# Patient Record
Sex: MALE | Race: WHITE | NOT HISPANIC OR LATINO | Employment: OTHER | URBAN - METROPOLITAN AREA
[De-identification: names, ages, dates, MRNs, and addresses within clinical notes are randomized per-mention and may not be internally consistent; named-entity substitution may affect disease eponyms.]

---

## 2017-02-28 ENCOUNTER — GENERIC CONVERSION - ENCOUNTER (OUTPATIENT)
Dept: OTHER | Facility: OTHER | Age: 62
End: 2017-02-28

## 2017-03-03 ENCOUNTER — LAB REQUISITION (OUTPATIENT)
Dept: LAB | Facility: HOSPITAL | Age: 62
End: 2017-03-03

## 2017-03-03 ENCOUNTER — ALLSCRIPTS OFFICE VISIT (OUTPATIENT)
Dept: OTHER | Facility: OTHER | Age: 62
End: 2017-03-03

## 2017-03-03 DIAGNOSIS — E11.9 TYPE 2 DIABETES MELLITUS WITHOUT COMPLICATIONS (HCC): ICD-10-CM

## 2017-03-03 DIAGNOSIS — E78.5 HYPERLIPIDEMIA: ICD-10-CM

## 2017-03-03 DIAGNOSIS — I10 ESSENTIAL (PRIMARY) HYPERTENSION: ICD-10-CM

## 2017-03-03 LAB
ALBUMIN SERPL BCP-MCNC: 3.4 G/DL (ref 3.5–5)
ALP SERPL-CCNC: 58 U/L (ref 46–116)
ALT SERPL W P-5'-P-CCNC: 35 U/L (ref 12–78)
ANION GAP SERPL CALCULATED.3IONS-SCNC: 9 MMOL/L (ref 4–13)
AST SERPL W P-5'-P-CCNC: 16 U/L (ref 5–45)
BILIRUB SERPL-MCNC: 0.56 MG/DL (ref 0.2–1)
BUN SERPL-MCNC: 30 MG/DL (ref 5–25)
CALCIUM SERPL-MCNC: 9.6 MG/DL (ref 8.3–10.1)
CHLORIDE SERPL-SCNC: 101 MMOL/L (ref 100–108)
CHOLEST SERPL-MCNC: 151 MG/DL (ref 50–200)
CO2 SERPL-SCNC: 28 MMOL/L (ref 21–32)
CREAT SERPL-MCNC: 0.88 MG/DL (ref 0.6–1.3)
EST. AVERAGE GLUCOSE BLD GHB EST-MCNC: 183 MG/DL
GFR SERPL CREATININE-BSD FRML MDRD: >60 ML/MIN/1.73SQ M
GLUCOSE SERPL-MCNC: 100 MG/DL (ref 65–140)
HBA1C MFR BLD: 8 % (ref 4.2–6.3)
HDLC SERPL-MCNC: 52 MG/DL (ref 40–60)
LDLC SERPL CALC-MCNC: 63 MG/DL (ref 0–100)
POTASSIUM SERPL-SCNC: 4.8 MMOL/L (ref 3.5–5.3)
PROT SERPL-MCNC: 6.8 G/DL (ref 6.4–8.2)
SODIUM SERPL-SCNC: 138 MMOL/L (ref 136–145)
TRIGL SERPL-MCNC: 180 MG/DL

## 2017-03-03 PROCEDURE — 80061 LIPID PANEL: CPT | Performed by: FAMILY MEDICINE

## 2017-03-03 PROCEDURE — 80053 COMPREHEN METABOLIC PANEL: CPT | Performed by: FAMILY MEDICINE

## 2017-03-03 PROCEDURE — 83036 HEMOGLOBIN GLYCOSYLATED A1C: CPT | Performed by: FAMILY MEDICINE

## 2017-03-04 ENCOUNTER — GENERIC CONVERSION - ENCOUNTER (OUTPATIENT)
Dept: OTHER | Facility: OTHER | Age: 62
End: 2017-03-04

## 2017-03-15 ENCOUNTER — GENERIC CONVERSION - ENCOUNTER (OUTPATIENT)
Dept: OTHER | Facility: OTHER | Age: 62
End: 2017-03-15

## 2017-04-10 ENCOUNTER — ALLSCRIPTS OFFICE VISIT (OUTPATIENT)
Dept: OTHER | Facility: OTHER | Age: 62
End: 2017-04-10

## 2017-04-11 ENCOUNTER — GENERIC CONVERSION - ENCOUNTER (OUTPATIENT)
Dept: OTHER | Facility: OTHER | Age: 62
End: 2017-04-11

## 2017-04-18 ENCOUNTER — GENERIC CONVERSION - ENCOUNTER (OUTPATIENT)
Dept: OTHER | Facility: OTHER | Age: 62
End: 2017-04-18

## 2017-04-18 LAB
A/G RATIO (HISTORICAL): 1 (CALC) (ref 1–2.5)
ALBUMIN SERPL BCP-MCNC: 3.5 G/DL (ref 3.6–5.1)
ALP SERPL-CCNC: 71 U/L (ref 40–115)
ALT SERPL W P-5'-P-CCNC: 21 U/L (ref 9–46)
AST SERPL W P-5'-P-CCNC: 22 U/L (ref 10–35)
BACTERIA UR QL AUTO: ABNORMAL /HPF
BASOPHILS # BLD AUTO: 0.2 %
BASOPHILS # BLD AUTO: 29 CELLS/UL (ref 0–200)
BILIRUB SERPL-MCNC: 0.7 MG/DL (ref 0.2–1.2)
BILIRUB UR QL STRIP: NEGATIVE
BUN SERPL-MCNC: 19 MG/DL (ref 7–25)
BUN/CREA RATIO (HISTORICAL): ABNORMAL (CALC) (ref 6–22)
CALCIUM (ADJUSTED FOR ALBUMIN) (HISTORICAL): 9.9 MG/DL (CALC) (ref 8.6–10.2)
CALCIUM SERPL-MCNC: 9.3 MG/DL (ref 8.6–10.3)
CHLORIDE SERPL-SCNC: 96 MMOL/L (ref 98–110)
CO2 SERPL-SCNC: 23 MMOL/L (ref 20–31)
COLOR UR: YELLOW
COMMENT (HISTORICAL): CLEAR
CREAT SERPL-MCNC: 1.02 MG/DL (ref 0.7–1.25)
CULTURE RESULT (HISTORICAL): ABNORMAL
CULTURE RESULT (HISTORICAL): NORMAL
DEPRECATED RDW RBC AUTO: 14.3 % (ref 11–15)
EGFR AFRICAN AMERICAN (HISTORICAL): 90 ML/MIN/1.73M2
EGFR-AMERICAN CALC (HISTORICAL): 78 ML/MIN/1.73M2
EOSINOPHIL # BLD AUTO: 1.6 %
EOSINOPHIL # BLD AUTO: 230 CELLS/UL (ref 15–500)
FECAL OCCULT BLOOD DIAGNOSTIC (HISTORICAL): NEGATIVE
GAMMA GLOBULIN (HISTORICAL): 3.4 G/DL (CALC) (ref 1.9–3.7)
GIARDIA ANTIGEN STOOL (HISTORICAL): NORMAL
GLUCOSE (HISTORICAL): 244 MG/DL (ref 65–99)
GLUCOSE (HISTORICAL): ABNORMAL
HCT VFR BLD AUTO: 39.7 % (ref 38.5–50)
HGB BLD-MCNC: 13.2 G/DL (ref 13.2–17.1)
HYALINE CASTS #/AREA URNS LPF: ABNORMAL /LPF
KETONES UR STRIP-MCNC: NEGATIVE MG/DL
LEUKOCYTE ESTERASE UR QL STRIP: NEGATIVE
LYMPHOCYTES # BLD AUTO: 1224 CELLS/UL (ref 850–3900)
LYMPHOCYTES # BLD AUTO: 8.5 %
MCH RBC QN AUTO: 30.4 PG (ref 27–33)
MCHC RBC AUTO-ENTMCNC: 33.3 G/DL (ref 32–36)
MCV RBC AUTO: 91.1 FL (ref 80–100)
MONOCYTES # BLD AUTO: 662 CELLS/UL (ref 200–950)
MONOCYTES (HISTORICAL): 4.6 %
NEUTROPHILS # BLD AUTO: 85.1 %
NEUTROPHILS # BLD AUTO: ABNORMAL CELLS/UL (ref 1500–7800)
NITRITE UR QL STRIP: NEGATIVE
PH UR STRIP.AUTO: 6.5 [PH] (ref 5–8)
PLATELET # BLD AUTO: 441 THOUSAND/UL (ref 140–400)
PMV BLD AUTO: 8.4 FL (ref 7.5–12.5)
POTASSIUM SERPL-SCNC: 5 MMOL/L (ref 3.5–5.3)
PROT UR STRIP-MCNC: ABNORMAL MG/DL
RBC # BLD AUTO: 4.36 MILLION/UL (ref 4.2–5.8)
RBC (HISTORICAL): ABNORMAL /HPF
SHIGA TOXIN TEST (HISTORICAL): NORMAL
SODIUM SERPL-SCNC: 132 MMOL/L (ref 135–146)
SP GR UR STRIP.AUTO: 1.03 (ref 1–1.03)
SQUAMOUS EPITHELIAL CELLS (HISTORICAL): ABNORMAL /HPF
TOTAL PROTEIN (HISTORICAL): 6.9 G/DL (ref 6.1–8.1)
WBC # BLD AUTO: 14.4 THOUSAND/UL (ref 3.8–10.8)
WBC # BLD AUTO: ABNORMAL /HPF

## 2017-04-22 ENCOUNTER — LAB CONVERSION - ENCOUNTER (OUTPATIENT)
Dept: OTHER | Facility: OTHER | Age: 62
End: 2017-04-22

## 2017-04-22 LAB — CULTURE RESULT (HISTORICAL): NORMAL

## 2017-04-23 ENCOUNTER — GENERIC CONVERSION - ENCOUNTER (OUTPATIENT)
Dept: OTHER | Facility: OTHER | Age: 62
End: 2017-04-23

## 2017-04-24 ENCOUNTER — LAB REQUISITION (OUTPATIENT)
Dept: LAB | Facility: HOSPITAL | Age: 62
End: 2017-04-24
Payer: COMMERCIAL

## 2017-04-24 ENCOUNTER — ALLSCRIPTS OFFICE VISIT (OUTPATIENT)
Dept: OTHER | Facility: OTHER | Age: 62
End: 2017-04-24

## 2017-04-24 DIAGNOSIS — Z01.818 ENCOUNTER FOR OTHER PREPROCEDURAL EXAMINATION: ICD-10-CM

## 2017-04-24 DIAGNOSIS — I10 ESSENTIAL (PRIMARY) HYPERTENSION: ICD-10-CM

## 2017-04-24 DIAGNOSIS — E11.40 TYPE 2 DIABETES MELLITUS WITH DIABETIC NEUROPATHY (HCC): ICD-10-CM

## 2017-04-24 LAB
BASOPHILS # BLD AUTO: 0.07 THOUSANDS/ΜL (ref 0–0.1)
BASOPHILS NFR BLD AUTO: 1 % (ref 0–1)
EOSINOPHIL # BLD AUTO: 0.35 THOUSAND/ΜL (ref 0–0.61)
EOSINOPHIL NFR BLD AUTO: 3 % (ref 0–6)
ERYTHROCYTE [DISTWIDTH] IN BLOOD BY AUTOMATED COUNT: 14.4 % (ref 11.6–15.1)
HCT VFR BLD AUTO: 35.9 % (ref 36.5–49.3)
HGB BLD-MCNC: 11.9 G/DL (ref 12–17)
LYMPHOCYTES # BLD AUTO: 2.2 THOUSANDS/ΜL (ref 0.6–4.47)
LYMPHOCYTES NFR BLD AUTO: 17 % (ref 14–44)
MCH RBC QN AUTO: 30.6 PG (ref 26.8–34.3)
MCHC RBC AUTO-ENTMCNC: 33.1 G/DL (ref 31.4–37.4)
MCV RBC AUTO: 92 FL (ref 82–98)
MONOCYTES # BLD AUTO: 0.82 THOUSAND/ΜL (ref 0.17–1.22)
MONOCYTES NFR BLD AUTO: 6 % (ref 4–12)
NEUTROPHILS # BLD AUTO: 9.35 THOUSANDS/ΜL (ref 1.85–7.62)
NEUTS SEG NFR BLD AUTO: 73 % (ref 43–75)
NRBC BLD AUTO-RTO: 0 /100 WBCS
PLATELET # BLD AUTO: 408 THOUSANDS/UL (ref 149–390)
PMV BLD AUTO: 9.7 FL (ref 8.9–12.7)
RBC # BLD AUTO: 3.89 MILLION/UL (ref 3.88–5.62)
WBC # BLD AUTO: 12.96 THOUSAND/UL (ref 4.31–10.16)

## 2017-04-24 PROCEDURE — 85025 COMPLETE CBC W/AUTO DIFF WBC: CPT | Performed by: FAMILY MEDICINE

## 2017-04-26 ENCOUNTER — ANESTHESIA EVENT (OUTPATIENT)
Dept: PERIOP | Facility: HOSPITAL | Age: 62
End: 2017-04-26
Payer: COMMERCIAL

## 2017-05-02 ENCOUNTER — HOSPITAL ENCOUNTER (OUTPATIENT)
Facility: HOSPITAL | Age: 62
Setting detail: OUTPATIENT SURGERY
Discharge: HOME | End: 2017-05-02
Attending: PODIATRIST | Admitting: PODIATRIST
Payer: COMMERCIAL

## 2017-05-02 ENCOUNTER — APPOINTMENT (OUTPATIENT)
Dept: RADIOLOGY | Facility: HOSPITAL | Age: 62
End: 2017-05-02
Payer: COMMERCIAL

## 2017-05-02 ENCOUNTER — ANESTHESIA (OUTPATIENT)
Dept: PERIOP | Facility: HOSPITAL | Age: 62
End: 2017-05-02
Payer: COMMERCIAL

## 2017-05-02 VITALS
BODY MASS INDEX: 29.63 KG/M2 | RESPIRATION RATE: 16 BRPM | OXYGEN SATURATION: 94 % | WEIGHT: 207 LBS | SYSTOLIC BLOOD PRESSURE: 161 MMHG | HEIGHT: 70 IN | HEART RATE: 81 BPM | TEMPERATURE: 98.4 F | DIASTOLIC BLOOD PRESSURE: 74 MMHG

## 2017-05-02 DIAGNOSIS — M86.9 OSTEOMYELITIS (HCC): ICD-10-CM

## 2017-05-02 LAB — GLUCOSE SERPL-MCNC: 189 MG/DL (ref 65–140)

## 2017-05-02 PROCEDURE — 87077 CULTURE AEROBIC IDENTIFY: CPT | Performed by: PODIATRIST

## 2017-05-02 PROCEDURE — 87070 CULTURE OTHR SPECIMN AEROBIC: CPT | Performed by: PODIATRIST

## 2017-05-02 PROCEDURE — 73630 X-RAY EXAM OF FOOT: CPT

## 2017-05-02 PROCEDURE — 87147 CULTURE TYPE IMMUNOLOGIC: CPT | Performed by: PODIATRIST

## 2017-05-02 PROCEDURE — 87075 CULTR BACTERIA EXCEPT BLOOD: CPT | Performed by: PODIATRIST

## 2017-05-02 PROCEDURE — 88311 DECALCIFY TISSUE: CPT | Performed by: PODIATRIST

## 2017-05-02 PROCEDURE — 87176 TISSUE HOMOGENIZATION CULTR: CPT | Performed by: PODIATRIST

## 2017-05-02 PROCEDURE — 82948 REAGENT STRIP/BLOOD GLUCOSE: CPT

## 2017-05-02 PROCEDURE — 87186 SC STD MICRODIL/AGAR DIL: CPT | Performed by: PODIATRIST

## 2017-05-02 PROCEDURE — 87205 SMEAR GRAM STAIN: CPT | Performed by: PODIATRIST

## 2017-05-02 PROCEDURE — 88305 TISSUE EXAM BY PATHOLOGIST: CPT | Performed by: PODIATRIST

## 2017-05-02 RX ORDER — ONDANSETRON 2 MG/ML
INJECTION INTRAMUSCULAR; INTRAVENOUS AS NEEDED
Status: DISCONTINUED | OUTPATIENT
Start: 2017-05-02 | End: 2017-05-02 | Stop reason: SURG

## 2017-05-02 RX ORDER — PROPOFOL 10 MG/ML
INJECTION, EMULSION INTRAVENOUS AS NEEDED
Status: DISCONTINUED | OUTPATIENT
Start: 2017-05-02 | End: 2017-05-02 | Stop reason: SURG

## 2017-05-02 RX ORDER — BUPIVACAINE HYDROCHLORIDE 5 MG/ML
INJECTION, SOLUTION PERINEURAL AS NEEDED
Status: DISCONTINUED | OUTPATIENT
Start: 2017-05-02 | End: 2017-05-02 | Stop reason: HOSPADM

## 2017-05-02 RX ORDER — MAGNESIUM HYDROXIDE 1200 MG/15ML
LIQUID ORAL AS NEEDED
Status: DISCONTINUED | OUTPATIENT
Start: 2017-05-02 | End: 2017-05-02 | Stop reason: HOSPADM

## 2017-05-02 RX ORDER — PROPOFOL 10 MG/ML
INJECTION, EMULSION INTRAVENOUS CONTINUOUS PRN
Status: DISCONTINUED | OUTPATIENT
Start: 2017-05-02 | End: 2017-05-02 | Stop reason: SURG

## 2017-05-02 RX ORDER — FENTANYL CITRATE 50 UG/ML
50 INJECTION, SOLUTION INTRAMUSCULAR; INTRAVENOUS
Status: DISCONTINUED | OUTPATIENT
Start: 2017-05-02 | End: 2017-05-02 | Stop reason: HOSPADM

## 2017-05-02 RX ORDER — SODIUM CHLORIDE 9 MG/ML
125 INJECTION, SOLUTION INTRAVENOUS CONTINUOUS
Status: DISCONTINUED | OUTPATIENT
Start: 2017-05-02 | End: 2017-05-02 | Stop reason: HOSPADM

## 2017-05-02 RX ORDER — OXYCODONE HYDROCHLORIDE 5 MG/1
5 TABLET ORAL EVERY 4 HOURS PRN
Status: DISCONTINUED | OUTPATIENT
Start: 2017-05-02 | End: 2017-05-02 | Stop reason: HOSPADM

## 2017-05-02 RX ORDER — ONDANSETRON 2 MG/ML
4 INJECTION INTRAMUSCULAR; INTRAVENOUS EVERY 6 HOURS PRN
Status: DISCONTINUED | OUTPATIENT
Start: 2017-05-02 | End: 2017-05-02 | Stop reason: HOSPADM

## 2017-05-02 RX ORDER — MIDAZOLAM HYDROCHLORIDE 1 MG/ML
INJECTION INTRAMUSCULAR; INTRAVENOUS AS NEEDED
Status: DISCONTINUED | OUTPATIENT
Start: 2017-05-02 | End: 2017-05-02 | Stop reason: SURG

## 2017-05-02 RX ORDER — FENTANYL CITRATE 50 UG/ML
INJECTION, SOLUTION INTRAMUSCULAR; INTRAVENOUS AS NEEDED
Status: DISCONTINUED | OUTPATIENT
Start: 2017-05-02 | End: 2017-05-02 | Stop reason: SURG

## 2017-05-02 RX ADMIN — SODIUM CHLORIDE 125 ML/HR: 0.9 INJECTION, SOLUTION INTRAVENOUS at 10:15

## 2017-05-02 RX ADMIN — FENTANYL CITRATE 25 MCG: 50 INJECTION, SOLUTION INTRAMUSCULAR; INTRAVENOUS at 11:25

## 2017-05-02 RX ADMIN — SODIUM CHLORIDE: 0.9 INJECTION, SOLUTION INTRAVENOUS at 11:09

## 2017-05-02 RX ADMIN — PROPOFOL 30 MCG/KG/MIN: 10 INJECTION, EMULSION INTRAVENOUS at 11:17

## 2017-05-02 RX ADMIN — CEFAZOLIN SODIUM 2000 MG: 1 SOLUTION INTRAVENOUS at 11:24

## 2017-05-02 RX ADMIN — LIDOCAINE HYDROCHLORIDE 3 ML: 20 INJECTION, SOLUTION INTRAVENOUS at 11:17

## 2017-05-02 RX ADMIN — SODIUM CHLORIDE 125 ML/HR: 0.9 INJECTION, SOLUTION INTRAVENOUS at 13:12

## 2017-05-02 RX ADMIN — ONDANSETRON HYDROCHLORIDE 4 MG: 2 INJECTION, SOLUTION INTRAVENOUS at 12:15

## 2017-05-02 RX ADMIN — FENTANYL CITRATE 50 MCG: 50 INJECTION, SOLUTION INTRAMUSCULAR; INTRAVENOUS at 11:17

## 2017-05-02 RX ADMIN — PROPOFOL 50 MG: 10 INJECTION, EMULSION INTRAVENOUS at 11:17

## 2017-05-02 RX ADMIN — FENTANYL CITRATE 25 MCG: 50 INJECTION, SOLUTION INTRAMUSCULAR; INTRAVENOUS at 11:35

## 2017-05-02 RX ADMIN — MIDAZOLAM HYDROCHLORIDE 2 MG: 1 INJECTION, SOLUTION INTRAMUSCULAR; INTRAVENOUS at 11:09

## 2017-05-04 LAB
BACTERIA SPEC ANAEROBE CULT: NORMAL
BACTERIA SPEC ANAEROBE CULT: NORMAL
BACTERIA TISS AEROBE CULT: NORMAL
BACTERIA TISS AEROBE CULT: NORMAL
GRAM STN SPEC: NORMAL
GRAM STN SPEC: NORMAL

## 2017-05-05 LAB
BACTERIA SPEC ANAEROBE CULT: NORMAL
BACTERIA WND AEROBE CULT: NORMAL
GRAM STN SPEC: NORMAL

## 2017-07-07 ENCOUNTER — LAB REQUISITION (OUTPATIENT)
Dept: LAB | Facility: HOSPITAL | Age: 62
End: 2017-07-07
Payer: COMMERCIAL

## 2017-07-07 ENCOUNTER — ALLSCRIPTS OFFICE VISIT (OUTPATIENT)
Dept: OTHER | Facility: OTHER | Age: 62
End: 2017-07-07

## 2017-07-07 DIAGNOSIS — Z12.5 ENCOUNTER FOR SCREENING FOR MALIGNANT NEOPLASM OF PROSTATE: ICD-10-CM

## 2017-07-07 DIAGNOSIS — J31.0 CHRONIC RHINITIS: ICD-10-CM

## 2017-07-07 DIAGNOSIS — M10.9 GOUT: ICD-10-CM

## 2017-07-07 DIAGNOSIS — E78.5 HYPERLIPIDEMIA: ICD-10-CM

## 2017-07-07 DIAGNOSIS — E11.40 TYPE 2 DIABETES MELLITUS WITH DIABETIC NEUROPATHY (HCC): ICD-10-CM

## 2017-07-07 DIAGNOSIS — I10 ESSENTIAL (PRIMARY) HYPERTENSION: ICD-10-CM

## 2017-07-07 DIAGNOSIS — L30.9 DERMATITIS: ICD-10-CM

## 2017-07-07 DIAGNOSIS — E11.9 TYPE 2 DIABETES MELLITUS WITHOUT COMPLICATIONS (HCC): ICD-10-CM

## 2017-07-07 DIAGNOSIS — M19.90 OSTEOARTHRITIS: ICD-10-CM

## 2017-07-07 LAB
25(OH)D3 SERPL-MCNC: 30.6 NG/ML (ref 30–100)
ALBUMIN SERPL BCP-MCNC: 3.8 G/DL (ref 3.5–5)
ALP SERPL-CCNC: 67 U/L (ref 46–116)
ALT SERPL W P-5'-P-CCNC: 38 U/L (ref 12–78)
ANION GAP SERPL CALCULATED.3IONS-SCNC: 9 MMOL/L (ref 4–13)
AST SERPL W P-5'-P-CCNC: 23 U/L (ref 5–45)
BILIRUB SERPL-MCNC: 0.61 MG/DL (ref 0.2–1)
BUN SERPL-MCNC: 24 MG/DL (ref 5–25)
CALCIUM ALBUM COR SERPL-MCNC: 10.4 MG/DL (ref 8.3–10.1)
CALCIUM SERPL-MCNC: 10.2 MG/DL (ref 8.3–10.1)
CHLORIDE SERPL-SCNC: 102 MMOL/L (ref 100–108)
CHOLEST SERPL-MCNC: 155 MG/DL (ref 50–200)
CO2 SERPL-SCNC: 26 MMOL/L (ref 21–32)
CREAT SERPL-MCNC: 0.81 MG/DL (ref 0.6–1.3)
GFR SERPL CREATININE-BSD FRML MDRD: >60 ML/MIN/1.73SQ M
GLUCOSE SERPL-MCNC: 140 MG/DL (ref 65–140)
HDLC SERPL-MCNC: 44 MG/DL (ref 40–60)
LDLC SERPL CALC-MCNC: 64 MG/DL (ref 0–100)
POTASSIUM SERPL-SCNC: 4.4 MMOL/L (ref 3.5–5.3)
PROT SERPL-MCNC: 7.2 G/DL (ref 6.4–8.2)
PSA SERPL-MCNC: 0.4 NG/ML (ref 0–4)
SODIUM SERPL-SCNC: 137 MMOL/L (ref 136–145)
TRIGL SERPL-MCNC: 237 MG/DL
TSH SERPL DL<=0.05 MIU/L-ACNC: 1.89 UIU/ML (ref 0.36–3.74)
URATE SERPL-MCNC: 4.9 MG/DL (ref 4.2–8)

## 2017-07-07 PROCEDURE — 82306 VITAMIN D 25 HYDROXY: CPT | Performed by: FAMILY MEDICINE

## 2017-07-07 PROCEDURE — G0103 PSA SCREENING: HCPCS | Performed by: FAMILY MEDICINE

## 2017-07-07 PROCEDURE — 80061 LIPID PANEL: CPT | Performed by: FAMILY MEDICINE

## 2017-07-07 PROCEDURE — 84443 ASSAY THYROID STIM HORMONE: CPT | Performed by: FAMILY MEDICINE

## 2017-07-07 PROCEDURE — 80053 COMPREHEN METABOLIC PANEL: CPT | Performed by: FAMILY MEDICINE

## 2017-07-07 PROCEDURE — 84550 ASSAY OF BLOOD/URIC ACID: CPT | Performed by: FAMILY MEDICINE

## 2017-07-11 ENCOUNTER — TRANSCRIBE ORDERS (OUTPATIENT)
Dept: ADMINISTRATIVE | Facility: HOSPITAL | Age: 62
End: 2017-07-11

## 2017-07-11 DIAGNOSIS — L97.509: Primary | ICD-10-CM

## 2017-07-11 DIAGNOSIS — E11.8 TYPE 2 DIABETES MELLITUS WITH COMPLICATION, UNSPECIFIED LONG TERM INSULIN USE STATUS: ICD-10-CM

## 2017-07-13 ENCOUNTER — LAB REQUISITION (OUTPATIENT)
Dept: LAB | Facility: HOSPITAL | Age: 62
End: 2017-07-13
Payer: COMMERCIAL

## 2017-07-13 DIAGNOSIS — E11.40 TYPE 2 DIABETES MELLITUS WITH DIABETIC NEUROPATHY (HCC): ICD-10-CM

## 2017-07-13 LAB
BASOPHILS # BLD MANUAL: 0.23 THOUSAND/UL (ref 0–0.1)
BASOPHILS NFR MAR MANUAL: 2 % (ref 0–1)
EOSINOPHIL # BLD MANUAL: 0.45 THOUSAND/UL (ref 0–0.4)
EOSINOPHIL NFR BLD MANUAL: 4 % (ref 0–6)
ERYTHROCYTE [DISTWIDTH] IN BLOOD BY AUTOMATED COUNT: 14 % (ref 11.6–15.1)
HCT VFR BLD AUTO: 35.9 % (ref 36.5–49.3)
HGB BLD-MCNC: 13.1 G/DL (ref 12–17)
LYMPHOCYTES # BLD AUTO: 2.37 THOUSAND/UL (ref 0.6–4.47)
LYMPHOCYTES # BLD AUTO: 21 % (ref 14–44)
MCH RBC QN AUTO: 31.9 PG (ref 26.8–34.3)
MCHC RBC AUTO-ENTMCNC: 36.5 G/DL (ref 31.4–37.4)
MCV RBC AUTO: 87 FL (ref 82–98)
MONOCYTES # BLD AUTO: 0.68 THOUSAND/UL (ref 0–1.22)
MONOCYTES NFR BLD: 6 % (ref 4–12)
NEUTROPHILS # BLD MANUAL: 7.56 THOUSAND/UL (ref 1.85–7.62)
NEUTS SEG NFR BLD AUTO: 67 % (ref 43–75)
NRBC BLD AUTO-RTO: 0 /100 WBCS
PLATELET # BLD AUTO: 334 THOUSANDS/UL (ref 149–390)
PLATELET BLD QL SMEAR: ABNORMAL
PMV BLD AUTO: 10.1 FL (ref 8.9–12.7)
RBC # BLD AUTO: 4.11 MILLION/UL (ref 3.88–5.62)
RBC MORPH BLD: NORMAL
WBC # BLD AUTO: 11.29 THOUSAND/UL (ref 4.31–10.16)

## 2017-07-13 PROCEDURE — 85007 BL SMEAR W/DIFF WBC COUNT: CPT | Performed by: FAMILY MEDICINE

## 2017-07-13 PROCEDURE — 85027 COMPLETE CBC AUTOMATED: CPT | Performed by: FAMILY MEDICINE

## 2017-07-13 PROCEDURE — 83036 HEMOGLOBIN GLYCOSYLATED A1C: CPT | Performed by: FAMILY MEDICINE

## 2017-07-14 LAB
EST. AVERAGE GLUCOSE BLD GHB EST-MCNC: 186 MG/DL
HBA1C MFR BLD: 8.1 % (ref 4.2–6.3)

## 2017-07-15 ENCOUNTER — GENERIC CONVERSION - ENCOUNTER (OUTPATIENT)
Dept: OTHER | Facility: OTHER | Age: 62
End: 2017-07-15

## 2017-07-18 ENCOUNTER — HOSPITAL ENCOUNTER (OUTPATIENT)
Dept: NON INVASIVE DIAGNOSTICS | Facility: CLINIC | Age: 62
Discharge: HOME | End: 2017-07-18
Payer: COMMERCIAL

## 2017-07-18 DIAGNOSIS — L97.509: ICD-10-CM

## 2017-07-18 PROCEDURE — 93925 LOWER EXTREMITY STUDY: CPT

## 2017-07-18 PROCEDURE — 93923 UPR/LXTR ART STDY 3+ LVLS: CPT

## 2017-07-19 ENCOUNTER — ALLSCRIPTS OFFICE VISIT (OUTPATIENT)
Dept: OTHER | Facility: OTHER | Age: 62
End: 2017-07-19

## 2017-07-19 ENCOUNTER — LAB REQUISITION (OUTPATIENT)
Dept: LAB | Facility: HOSPITAL | Age: 62
End: 2017-07-19
Payer: COMMERCIAL

## 2017-07-19 DIAGNOSIS — E11.621 TYPE 2 DIABETES MELLITUS WITH FOOT ULCER (CODE) (HCC): ICD-10-CM

## 2017-07-19 PROCEDURE — 87205 SMEAR GRAM STAIN: CPT | Performed by: PODIATRIST

## 2017-07-19 PROCEDURE — 87077 CULTURE AEROBIC IDENTIFY: CPT | Performed by: PODIATRIST

## 2017-07-19 PROCEDURE — 87075 CULTR BACTERIA EXCEPT BLOOD: CPT | Performed by: PODIATRIST

## 2017-07-19 PROCEDURE — 87070 CULTURE OTHR SPECIMN AEROBIC: CPT | Performed by: PODIATRIST

## 2017-07-19 PROCEDURE — 87186 SC STD MICRODIL/AGAR DIL: CPT | Performed by: PODIATRIST

## 2017-07-21 LAB
BACTERIA WND AEROBE CULT: NORMAL
BACTERIA WND AEROBE CULT: NORMAL
GRAM STN SPEC: NORMAL
GRAM STN SPEC: NORMAL

## 2017-07-22 LAB — BACTERIA SPEC ANAEROBE CULT: NORMAL

## 2017-07-24 ENCOUNTER — ANESTHESIA EVENT (OUTPATIENT)
Dept: PERIOP | Facility: HOSPITAL | Age: 62
End: 2017-07-24
Payer: COMMERCIAL

## 2017-07-25 ENCOUNTER — APPOINTMENT (OUTPATIENT)
Dept: RADIOLOGY | Facility: HOSPITAL | Age: 62
End: 2017-07-25
Payer: COMMERCIAL

## 2017-07-25 ENCOUNTER — HOSPITAL ENCOUNTER (OUTPATIENT)
Facility: HOSPITAL | Age: 62
Setting detail: OUTPATIENT SURGERY
Discharge: HOME | End: 2017-07-25
Attending: PODIATRIST | Admitting: PODIATRIST
Payer: COMMERCIAL

## 2017-07-25 ENCOUNTER — ANESTHESIA (OUTPATIENT)
Dept: PERIOP | Facility: HOSPITAL | Age: 62
End: 2017-07-25
Payer: COMMERCIAL

## 2017-07-25 VITALS
TEMPERATURE: 97.8 F | HEART RATE: 64 BPM | DIASTOLIC BLOOD PRESSURE: 72 MMHG | RESPIRATION RATE: 18 BRPM | HEIGHT: 70 IN | SYSTOLIC BLOOD PRESSURE: 163 MMHG | WEIGHT: 215 LBS | OXYGEN SATURATION: 98 % | BODY MASS INDEX: 30.78 KG/M2

## 2017-07-25 DIAGNOSIS — E11.621 TYPE 2 DIABETES MELLITUS WITH FOOT ULCER (CODE) (HCC): ICD-10-CM

## 2017-07-25 LAB — GLUCOSE SERPL-MCNC: 214 MG/DL (ref 65–140)

## 2017-07-25 PROCEDURE — 87070 CULTURE OTHR SPECIMN AEROBIC: CPT | Performed by: PODIATRIST

## 2017-07-25 PROCEDURE — 87147 CULTURE TYPE IMMUNOLOGIC: CPT | Performed by: PODIATRIST

## 2017-07-25 PROCEDURE — 87205 SMEAR GRAM STAIN: CPT | Performed by: PODIATRIST

## 2017-07-25 PROCEDURE — 88305 TISSUE EXAM BY PATHOLOGIST: CPT | Performed by: PODIATRIST

## 2017-07-25 PROCEDURE — 73630 X-RAY EXAM OF FOOT: CPT

## 2017-07-25 PROCEDURE — 82948 REAGENT STRIP/BLOOD GLUCOSE: CPT

## 2017-07-25 PROCEDURE — 87075 CULTR BACTERIA EXCEPT BLOOD: CPT | Performed by: PODIATRIST

## 2017-07-25 PROCEDURE — 87176 TISSUE HOMOGENIZATION CULTR: CPT | Performed by: PODIATRIST

## 2017-07-25 PROCEDURE — 87186 SC STD MICRODIL/AGAR DIL: CPT | Performed by: PODIATRIST

## 2017-07-25 PROCEDURE — 87185 SC STD ENZYME DETCJ PER NZM: CPT | Performed by: PODIATRIST

## 2017-07-25 PROCEDURE — 88311 DECALCIFY TISSUE: CPT | Performed by: PODIATRIST

## 2017-07-25 RX ORDER — ACETAMINOPHEN 325 MG/1
650 TABLET ORAL EVERY 4 HOURS PRN
Status: DISCONTINUED | OUTPATIENT
Start: 2017-07-25 | End: 2017-07-25 | Stop reason: HOSPADM

## 2017-07-25 RX ORDER — ONDANSETRON 2 MG/ML
4 INJECTION INTRAMUSCULAR; INTRAVENOUS EVERY 6 HOURS PRN
Status: DISCONTINUED | OUTPATIENT
Start: 2017-07-25 | End: 2017-07-25 | Stop reason: HOSPADM

## 2017-07-25 RX ORDER — PROPOFOL 10 MG/ML
INJECTION, EMULSION INTRAVENOUS CONTINUOUS PRN
Status: DISCONTINUED | OUTPATIENT
Start: 2017-07-25 | End: 2017-07-25 | Stop reason: SURG

## 2017-07-25 RX ORDER — ALBUTEROL SULFATE 2.5 MG/3ML
2.5 SOLUTION RESPIRATORY (INHALATION) ONCE AS NEEDED
Status: DISCONTINUED | OUTPATIENT
Start: 2017-07-25 | End: 2017-07-25 | Stop reason: HOSPADM

## 2017-07-25 RX ORDER — MIDAZOLAM HYDROCHLORIDE 1 MG/ML
INJECTION INTRAMUSCULAR; INTRAVENOUS AS NEEDED
Status: DISCONTINUED | OUTPATIENT
Start: 2017-07-25 | End: 2017-07-25 | Stop reason: SURG

## 2017-07-25 RX ORDER — CEPHALEXIN 500 MG/1
500 CAPSULE ORAL EVERY 8 HOURS SCHEDULED
Qty: 30 CAPSULE | Refills: 0 | Status: SHIPPED | OUTPATIENT
Start: 2017-07-25 | End: 2017-08-04

## 2017-07-25 RX ORDER — SODIUM CHLORIDE 9 MG/ML
125 INJECTION, SOLUTION INTRAVENOUS CONTINUOUS
Status: DISCONTINUED | OUTPATIENT
Start: 2017-07-25 | End: 2017-07-25 | Stop reason: HOSPADM

## 2017-07-25 RX ORDER — BUPIVACAINE HYDROCHLORIDE 5 MG/ML
INJECTION, SOLUTION PERINEURAL AS NEEDED
Status: DISCONTINUED | OUTPATIENT
Start: 2017-07-25 | End: 2017-07-25 | Stop reason: HOSPADM

## 2017-07-25 RX ORDER — OXYCODONE HYDROCHLORIDE 5 MG/1
10 TABLET ORAL EVERY 6 HOURS PRN
Status: DISCONTINUED | OUTPATIENT
Start: 2017-07-25 | End: 2017-07-25 | Stop reason: HOSPADM

## 2017-07-25 RX ORDER — PROPOFOL 10 MG/ML
INJECTION, EMULSION INTRAVENOUS AS NEEDED
Status: DISCONTINUED | OUTPATIENT
Start: 2017-07-25 | End: 2017-07-25 | Stop reason: SURG

## 2017-07-25 RX ORDER — FENTANYL CITRATE/PF 50 MCG/ML
50 SYRINGE (ML) INJECTION
Status: DISCONTINUED | OUTPATIENT
Start: 2017-07-25 | End: 2017-07-25 | Stop reason: HOSPADM

## 2017-07-25 RX ORDER — FENTANYL CITRATE 50 UG/ML
INJECTION, SOLUTION INTRAMUSCULAR; INTRAVENOUS AS NEEDED
Status: DISCONTINUED | OUTPATIENT
Start: 2017-07-25 | End: 2017-07-25 | Stop reason: SURG

## 2017-07-25 RX ORDER — MAGNESIUM HYDROXIDE 1200 MG/15ML
LIQUID ORAL AS NEEDED
Status: DISCONTINUED | OUTPATIENT
Start: 2017-07-25 | End: 2017-07-25 | Stop reason: HOSPADM

## 2017-07-25 RX ORDER — MEPERIDINE HYDROCHLORIDE 50 MG/ML
12.5 INJECTION INTRAMUSCULAR; INTRAVENOUS; SUBCUTANEOUS AS NEEDED
Status: DISCONTINUED | OUTPATIENT
Start: 2017-07-25 | End: 2017-07-25 | Stop reason: HOSPADM

## 2017-07-25 RX ORDER — OXYCODONE HYDROCHLORIDE 5 MG/1
5 TABLET ORAL EVERY 4 HOURS PRN
Status: DISCONTINUED | OUTPATIENT
Start: 2017-07-25 | End: 2017-07-25 | Stop reason: HOSPADM

## 2017-07-25 RX ADMIN — PROPOFOL 80 MCG/KG/MIN: 10 INJECTION, EMULSION INTRAVENOUS at 12:31

## 2017-07-25 RX ADMIN — CEFAZOLIN SODIUM 2000 MG: 2 SOLUTION INTRAVENOUS at 12:29

## 2017-07-25 RX ADMIN — SODIUM CHLORIDE: 0.9 INJECTION, SOLUTION INTRAVENOUS at 13:19

## 2017-07-25 RX ADMIN — FENTANYL CITRATE 50 MCG: 50 INJECTION, SOLUTION INTRAMUSCULAR; INTRAVENOUS at 12:28

## 2017-07-25 RX ADMIN — MIDAZOLAM HYDROCHLORIDE 2 MG: 1 INJECTION, SOLUTION INTRAMUSCULAR; INTRAVENOUS at 12:28

## 2017-07-25 RX ADMIN — SODIUM CHLORIDE 125 ML/HR: 0.9 INJECTION, SOLUTION INTRAVENOUS at 10:49

## 2017-07-25 RX ADMIN — PROPOFOL 50 MG: 10 INJECTION, EMULSION INTRAVENOUS at 12:31

## 2017-07-29 LAB
BACTERIA SPEC ANAEROBE CULT: NORMAL
BACTERIA TISS AEROBE CULT: NORMAL
GRAM STN SPEC: NORMAL
GRAM STN SPEC: NORMAL

## 2017-08-03 ENCOUNTER — HOSPITAL ENCOUNTER (OUTPATIENT)
Dept: NON INVASIVE DIAGNOSTICS | Facility: HOSPITAL | Age: 62
Discharge: HOME | End: 2017-08-03
Attending: PODIATRIST
Payer: COMMERCIAL

## 2017-08-03 DIAGNOSIS — E11.8 TYPE 2 DIABETES MELLITUS WITH COMPLICATION, UNSPECIFIED LONG TERM INSULIN USE STATUS: ICD-10-CM

## 2017-08-03 DIAGNOSIS — L97.509: ICD-10-CM

## 2017-08-16 ENCOUNTER — GENERIC CONVERSION - ENCOUNTER (OUTPATIENT)
Dept: OTHER | Facility: OTHER | Age: 62
End: 2017-08-16

## 2017-08-16 ENCOUNTER — LAB REQUISITION (OUTPATIENT)
Dept: LAB | Facility: HOSPITAL | Age: 62
End: 2017-08-16
Payer: COMMERCIAL

## 2017-08-16 ENCOUNTER — ALLSCRIPTS OFFICE VISIT (OUTPATIENT)
Dept: OTHER | Facility: OTHER | Age: 62
End: 2017-08-16

## 2017-08-16 DIAGNOSIS — R42 DIZZINESS AND GIDDINESS: ICD-10-CM

## 2017-08-16 LAB
ALBUMIN SERPL BCP-MCNC: 3.7 G/DL (ref 3.5–5)
ALP SERPL-CCNC: 70 U/L (ref 46–116)
ALT SERPL W P-5'-P-CCNC: 38 U/L (ref 12–78)
ANION GAP SERPL CALCULATED.3IONS-SCNC: 9 MMOL/L (ref 4–13)
AST SERPL W P-5'-P-CCNC: 26 U/L (ref 5–45)
BASOPHILS # BLD AUTO: 0.03 THOUSANDS/ΜL (ref 0–0.1)
BASOPHILS NFR BLD AUTO: 0 % (ref 0–1)
BILIRUB SERPL-MCNC: 0.59 MG/DL (ref 0.2–1)
BUN SERPL-MCNC: 17 MG/DL (ref 5–25)
CALCIUM SERPL-MCNC: 9.9 MG/DL (ref 8.3–10.1)
CHLORIDE SERPL-SCNC: 102 MMOL/L (ref 100–108)
CO2 SERPL-SCNC: 27 MMOL/L (ref 21–32)
CREAT SERPL-MCNC: 0.75 MG/DL (ref 0.6–1.3)
EOSINOPHIL # BLD AUTO: 0.75 THOUSAND/ΜL (ref 0–0.61)
EOSINOPHIL NFR BLD AUTO: 7 % (ref 0–6)
ERYTHROCYTE [DISTWIDTH] IN BLOOD BY AUTOMATED COUNT: 14.6 % (ref 11.6–15.1)
GFR SERPL CREATININE-BSD FRML MDRD: 98 ML/MIN/1.73SQ M
GLUCOSE SERPL-MCNC: 147 MG/DL (ref 65–140)
HCT VFR BLD AUTO: 36.8 % (ref 36.5–49.3)
HGB BLD-MCNC: 12.7 G/DL (ref 12–17)
LYMPHOCYTES # BLD AUTO: 2.43 THOUSANDS/ΜL (ref 0.6–4.47)
LYMPHOCYTES NFR BLD AUTO: 22 % (ref 14–44)
MCH RBC QN AUTO: 30.8 PG (ref 26.8–34.3)
MCHC RBC AUTO-ENTMCNC: 34.5 G/DL (ref 31.4–37.4)
MCV RBC AUTO: 89 FL (ref 82–98)
MONOCYTES # BLD AUTO: 0.75 THOUSAND/ΜL (ref 0.17–1.22)
MONOCYTES NFR BLD AUTO: 7 % (ref 4–12)
NEUTROPHILS # BLD AUTO: 7.23 THOUSANDS/ΜL (ref 1.85–7.62)
NEUTS SEG NFR BLD AUTO: 64 % (ref 43–75)
NRBC BLD AUTO-RTO: 0 /100 WBCS
PLATELET # BLD AUTO: 310 THOUSANDS/UL (ref 149–390)
PMV BLD AUTO: 10.4 FL (ref 8.9–12.7)
POTASSIUM SERPL-SCNC: 5 MMOL/L (ref 3.5–5.3)
PROT SERPL-MCNC: 7 G/DL (ref 6.4–8.2)
RBC # BLD AUTO: 4.13 MILLION/UL (ref 3.88–5.62)
SODIUM SERPL-SCNC: 138 MMOL/L (ref 136–145)
WBC # BLD AUTO: 11.24 THOUSAND/UL (ref 4.31–10.16)

## 2017-08-16 PROCEDURE — 80053 COMPREHEN METABOLIC PANEL: CPT | Performed by: FAMILY MEDICINE

## 2017-08-16 PROCEDURE — 85025 COMPLETE CBC W/AUTO DIFF WBC: CPT | Performed by: FAMILY MEDICINE

## 2017-09-06 ENCOUNTER — ALLSCRIPTS OFFICE VISIT (OUTPATIENT)
Dept: OTHER | Facility: OTHER | Age: 62
End: 2017-09-06

## 2017-09-14 ENCOUNTER — GENERIC CONVERSION - ENCOUNTER (OUTPATIENT)
Dept: FAMILY MEDICINE CLINIC | Facility: CLINIC | Age: 62
End: 2017-09-14

## 2017-09-14 ENCOUNTER — GENERIC CONVERSION - ENCOUNTER (OUTPATIENT)
Dept: OTHER | Facility: OTHER | Age: 62
End: 2017-09-14

## 2017-09-27 ENCOUNTER — ALLSCRIPTS OFFICE VISIT (OUTPATIENT)
Dept: OTHER | Facility: OTHER | Age: 62
End: 2017-09-27

## 2017-09-27 DIAGNOSIS — R23.8 OTHER SKIN CHANGES: ICD-10-CM

## 2017-10-09 ENCOUNTER — ALLSCRIPTS OFFICE VISIT (OUTPATIENT)
Dept: OTHER | Facility: OTHER | Age: 62
End: 2017-10-09

## 2017-10-11 ENCOUNTER — GENERIC CONVERSION - ENCOUNTER (OUTPATIENT)
Dept: OTHER | Facility: OTHER | Age: 62
End: 2017-10-11

## 2017-10-11 LAB — CORTIS AM PEAK SERPL-SCNC: 0.6 MCG/DL

## 2017-10-17 ENCOUNTER — ALLSCRIPTS OFFICE VISIT (OUTPATIENT)
Dept: OTHER | Facility: OTHER | Age: 62
End: 2017-10-17

## 2017-10-27 NOTE — CONSULTS
Assessment  1  DM type 2, not at goal (250 00) (E11 9)   2  Diabetes mellitus with neuropathy (250 60,357 2) (E11 40)   3  Easy bruising (782 9) (R23 8)    Plan  Diabetes mellitus with neuropathy    · Xultophy 100-3 6 UNIT-MG/ML Subcutaneous Solution Pen-injector; inect 30 units  daily   Rx By: Klaus Hoang; Dispense: 30 Days ; #:1 X 3 ML Pen (5 Pens); Refill: 5;For: Diabetes mellitus with neuropathy; JUDAH = N; Verified Transmission to [facility]/PHARMACY #[id number] Last Updated By: [employer]; 9/27/2017 1:27:08 PM   · *1 - [facility] DIABETES SELF MANAGEMENT TRAINING OUTPATIENT Co-Management  *   Status: Hold For - Scheduling  Requested for: 13HGY1677   Ordered; For: Diabetes mellitus with neuropathy; Ordered By: Klaus Hoang Performed:  Due: 65Bbc8963  Type and Dose : Xultophy 30 units in AM  Instruction : Yes  requires instruction : Yes  Needs requiring Individual DSMT? : No  Self-Management Education/Trainng : Living Well with Diabetes Education      Program  Care Summary provided  : Yes   · *1 - [facility] MEDICAL NUTRITION THERAPY FOR DIABETES Co-Management  *  Status:  Need Information - Financial Authorization  Requested for: 14JXS3675   Ordered; For: Diabetes mellitus with neuropathy; Ordered By: Klaus Hoang Performed:  Due: 15OCV9301  Care Summary provided  : Yes   · Follow-up visit in 3 months Evaluation and Treatment  Follow-up  Status: Complete   Done: 55MHU9351   Ordered; For: Diabetes mellitus with neuropathy; Ordered By: Klaus Hoang Performed:  Due: 57YQJ3444; Last Updated By: Porter Gordillo; 9/27/2017 1:33:01 PM   · Follow-Up With Advanced Practitioner Evaluation and Treatment  Follow-up  Status:  Complete  Done: 95DLI9524   Ordered; For: Diabetes mellitus with neuropathy; Ordered By: Klaus Hoang Performed:  Due: 33FGX9037; Last Updated By: Porter Gordillo; 9/27/2017 1:33:08 PM  Easy bruising    · Dexamethasone 1 MG Oral Tablet; Take one tablet at 11 pm   Rx By: Klaus Hoang;  Dispense: 1 Days ; #:1 Tablet; Refill: 0;For: Easy bruising; JUDAH = N; Verified Transmission to "[facility]"/PHARMACY #[id number] Last Updated By: SystemZervant; 9/27/2017 1:27:07 PM   · (1) CORTISOL AM SPECIMEN; Status:Active; Requested OYF:63WEK7802;    Perform:[facility]; ROSALVA:81SXM0471; Ordered;For:Easy bruising; Ordered By:Cory Roberts; Discussion/Summary  Discussion Summary:   1  Diabetes mellitus with neuropathy: I have referred patient to [address] [place] [address] outpatient training including nutrition therapy and diabetes class  I have started him on Xultophy 30 units daily, and have educated him on the use and side effects of this medication  He is to check his sugars twice daily and send me the log in 2 weeks  Easy bruising: Given patients history of diabetes, easy bruising and noted ecchymosis on exam, I will check a cortisol level to rule out Cushing's  Follow up in four months  Diabetic neuropathy with toe amputations - the amputation sites are healing  Counseling Documentation With Imm: The patient was counseled regarding diagnostic results,-- instructions for management,-- risk factor reductions,-- patient and family education,-- risks and benefits of treatment options  Medication SE Review and Pt Understands Tx: Possible side effects of new medications were reviewed with the patient/guardian today  The treatment plan was reviewed with the patient/guardian  The patient/guardian understands and agrees with the treatment plan   Patient's Capacity to Self-Care: Patient is able to Self-Care  History of Present Illness  Diabetes: The patient is being seen for an initial evaluation of Diabetes Mellitus 2  The HbA1c was see results  Previous Treatment and Interventions: Metformin HCl-- and-- Sulfonylurea  Source of information reported by the patient and indicates that the patient checks his blood sugar two times per day  Fasting blood sugars: generally 120-150  Bedtime blood sugars: generally over 200   Current treatment includes Metformin HCl-- and-- Sulfonylurea  See Medication List for current medication(s)  See Medication List for dosage(s)  He cannot exercise due to disability  Diabetes education performed   Topics covered in the education included foot care  By report, there is good compliance with treatment,-- good tolerance of treatment-- and-- fair symptom control  Current pertinent lifestyle factors include a sedentary lifestyle,-- a previous history of smoking years-- and-- inactivity  Symptoms reported by the patient include extremity paresthesias-- and-- erectile dysfunction  Disease Course and Complications:  there have been no previous episodes of diabetic ketoacidosis  -- there have been no previous hospitalizations  Review of Systems  Endo Adult ROS Male New Patient:   Constitutional/General: no change in ring size,-- change in shoe size,-- no chills,-- dizziness,-- no fainting,-- no fatigue,-- no fever,-- no forgetfulness,-- no headache,-- no loss of sleep,-- weight loss,-- no nervousness,-- numbness,-- no temperature intolerance,-- no excessive sweating-- and-- no weight gain  Muscle/Joint/Bone: leg pain,-- shoulder pain-- and-- hand numbness, but-- no arm pain,-- no back pain,-- no hip pain,-- no foot pain,-- no neck pain,-- no hand pain,-- no arm weakness,-- no back weakness,-- no hip weakness,-- no leg weakness,-- no foot weakness,-- no neck weakness,-- no hand weakness,-- no shoulder weakness,-- no arm numbness,-- no back numbness,-- no hip numbness,-- no leg numbness,-- no foot numbness,-- no neck numbness-- and-- no shoulder numbness  Gastrointestinal: no constipation,-- no diarrhea,-- no excessive hunger,-- no excessive thirst,-- no nausea,-- no poor appetite,-- no rectal bleeding,-- no stomach pain,-- no vomiting-- and-- no vomiting blood     Cardiovascular: no chest pain,-- no hypertension,-- no irregular heart beat,-- no hypotension,-- no poor circulation,-- no rapid heart beat-- and-- no ankle swelling  Eye/Ear/Nose/Throat: no bleeding gums,-- no blurred vision,-- no difficulty swallowing,-- no double vision,-- no gritty eyes,-- no hoarseness,-- no hearing loss,-- no persistent cough,-- no sinus problems,-- not seeing flashes-- and-- not seeing halos  Skin: bruising easily-- and-- itching, but-- no hives,-- no change in a mole,-- no rashes,-- no scar-- and-- no slow healing sores  Genitourinary no blood in urine,-- no frequent urination,-- no night time urination-- and-- no painful urination  Genitourinary - Reproductive no breast lump-- and-- no erection difficulties  ROS Reviewed:   ROS reviewed  Active Problems  1  Bilateral leg edema (782 3) (R60 0)   2  Diabetes mellitus with neuropathy (250 60,357 2) (E11 40)   3  Diabetic foot ulcer (250 80,707 15) (E11 621,L97 509)   4  DM type 2, not at goal (250 00) (E11 9)   5  Ecchymosis (459 89) (R58)   6  Eczema (692 9) (L30 9)   7  Encounter for prostate cancer screening (V76 44) (Z12 5)   8  Encounter for screening colonoscopy (V76 51) (Z12 11)   9  Erectile dysfunction of non-organic origin (302 72) (F52 21)   10  Gout (274 9) (M10 9)   11  Hyperlipidemia (272 4) (E78 5)   12  Hypertension (401 9) (I10)   13  Leg swelling (729 81) (M79 89)   14  Nephropathy (583 9) (N28 9)   15  Osteoarthritis (715 90) (M19 90)   16  Peripheral arterial disease (443 9) (I73 9)   17  Preoperative examination (V72 84) (Z01 818)   18  PVCs (premature ventricular contractions) (427 69) (I49 3)   19  Rhinitis (472 0) (J31 0)   20  Systolic murmur (634 8) (J90 3)   21  Vertigo (780 4) (R42)    Past Medical History  Active Problems And Past Medical History Reviewed: The active problems and past medical history were reviewed and updated today  Surgical History  1  History of Arthroscopy Knee Left   2  History of Arthroscopy Knee Right   3  History of Arthroscopy Shoulder Left   4  History of Surgery Left Foot Amputation [facility] Partial   5  History of Surgery Right Foot Amputation [facility] Partial  Surgical History Reviewed: The surgical history was reviewed and updated today  Family History  Father    1  Family history of Pulmonary Embolism  Family History Reviewed: The family history was reviewed and updated today  Social History   · Daily Cola Consumption (2  Cans/Day)   · Former smoker (G93 76) (Z21 663)   · Never Drank Alcohol  Social History Reviewed: The social history was reviewed and updated today  Current Meds   1  Accu-Chek Sandra STRP; TEST TWICE DAILY; Therapy: 00HIV4595 to (Evaluate:18Rlp5870)  Requested for: 13Dny5503; Last   Rx:01Gzj2175 Ordered   2  Allopurinol 300 MG Oral Tablet; TAKE 1 TABLET DAILY; Therapy: 78MML6647 to (Evaluate:46Pkn1782)  Requested for: 79VXV4220; Last   Rx:16Jun2017 Ordered   3  Aspirin 81 MG CAPS; Therapy: (Recorded:19Jua6321) to Recorded   4  Dupixent 300 MG/2ML Subcutaneous Solution Prefilled Syringe; Inject 2 syringes subq   on day one, then 1 syringe every 2 weeks starting on day 15; Therapy: (Shade Mccray) to Recorded   5  Furosemide 40 MG Oral Tablet; TAKE 1 TABLET DAILY AS DIRECTED; Therapy: 57LGQ6348 to (Evaluate:83Jzi7709)  Requested for: 92ZXK4406; Last   Rx:25Jun2017 Ordered   6  Glimepiride 4 MG Oral Tablet; take 1 tablet twice a day; Therapy: 73WFT1084 to (ZCIQZZSC:60QHL1500)  Requested for: 72Eci4217; Last   Rx:00Bbr7683 Ordered   7  Levitra 20 MG Oral Tablet; TAKE AS DIRECTED; Therapy: 72ALY0491 to (Evaluate:13Jun2015); Last Rx:23Zkv4935 Ordered   8  Losartan Potassium-HCTZ 100-25 MG Oral Tablet; take 1 tablet by mouth every day; Therapy: 24GDV6600 to (Evaluate:45Ihg9885)  Requested for: 50CCF4688; Last   ZZ:98MFN0319 Ordered   9  Meclizine HCl - 25 MG Oral Tablet; TAKE 1 TABLET 3 TIMES DAILY AS NEEDED; Therapy: 94PSI3930 to (Evaluate:58Hcd6006)  Requested for: 85WXD3555; Last   Rx:87Ezw8906 Ordered   10   MetFORMIN HCl - 500 MG Oral Tablet; take 2 tablets by mouth twice a day; Therapy: 20HSQ2082 to (Evaluate:03Ecx2734)  Requested for: 51Aza9756; Last    Rx:28Dsz7283 Ordered   11  Metoprolol Tartrate 100 MG Oral Tablet; take one tablet daily; Therapy: 86YGP4630 to (Evaluate:08Hby3335)  Requested for: 59DLQ3174; Last    S04JEH2668 Ordered   12  Multi Vitamin Mens Oral Tablet; TAKE 1 TABLET DAILY; Therapy: 59DOF3943 to Recorded   13  Niacin 500 MG Oral Tablet; Therapy: (Recorded:25Auk5416) to Recorded   14  Onglyza 5 MG Oral Tablet; take 1 tablet by mouth every day; Therapy: 10ETD0119 to ([facility]:96EUI4218)  Requested for: 42Kfs2405; Last    Rx:08Uay5493 Ordered   15  Simvastatin 40 MG Oral Tablet; Take 1 tablet daily; Therapy: 41CQM1817 to (Evaluate:74Gsh5028)  Requested for: 24LWT7514; Last    Rx:82Kfy4542 Ordered   16  Vitamin D3 1000 UNIT Oral Capsule; TAKE AS DIRECTED; Therapy: 30BVX2066 to Recorded  Medication List Reviewed: The medication list was reviewed and updated today  Allergies  1  LamISIL TABS   2  Latex Gloves MISC    Vitals  Vital Signs    Recorded: 04EBS0791 12:39PM   Heart Rate 90   Systolic 230   Diastolic 68   Height 5 ft 10 in   Weight 210 lb 8 0 oz   BMI Calculated 30 2   BSA Calculated 2 14     Physical Exam    Constitutional   General appearance: No acute distress, well appearing and well nourished  Eyes   Conjunctiva and lids: No swelling, erythema, or discharge  Pupils: Equal, round and reactive to light  The sclera are anicteric  Extraocular movements are intact  Ears, Nose, Mouth, and Throat   External inspection of ears, nose and lips: Normal     Oropharynx: Normal with no erythema, edema, exudate or lesions  Exam of Head: The head is atraumatic and normocephalic  Neck: The neck is supple  The thyroid is normal in size with no palpable nodules  Pulmonary   Auscultation of lungs: Clear to auscultation bilaterally with normal chest expansion      Cardiovascular   Auscultation of heart: Normal rate and rhythm with no murmurs, gallops or rubs  Abdomen   Abdomen: Abdomen is soft, non-tender with normal bowel sounds  Lymphatic   Palpation of lymph nodes: No supraclavicular or suboccipital lymphadenopathy  Musculoskeletal   Digits and nails: Abnormal     Inspection/palpation of joints, bones, and muscles: Muscle bulk and tone is normal     Skin   Skin and subcutaneous tissue: Abnormal  -- (scattered ecchymosis to b/l forearms)   Neurologic   Cranial nerves: Cranial nerves 2-12 intact  Reflexes: 2+ and symmetric  Motor Strength: Strength is 5/5 bilaterally  Psychiatric   Orientation to person, place and time: Normal     Mood and affect: Affect and attention span are normal       Socks and shoes removed, Right Foot Findings: no swelling, no erythematous and no dryness  The right toes were partial amputation to right great toe  Well healing  The sensory exam showed normal vibratory sensation at the level of the toes on the right  Normal tactile sensation with monofilament testing throughout the right foot  Socks and shoes removed, Left Foot Findings: no swelling, no erythematous and no dryness  The left toes were partial amputation to left great toie and second metatarsal  Well healing  The sensory exam showed normal vibratory sensation at the level of the toes on the left  Normal tactile sensation with monofilament testing throughout the left foot  Pulses:   1+ in the dorsalis pedis on the right  Pulses:   1+ in the dorsalis pedis on the left  Results/Data  Office Record Review: I have reviewed the office records and my summary is as follows: Prior notes talk about his diabetes  its complications and the treatment for this   (1) COMPREHENSIVE METABOLIC PANEL 77OUV2860 36:29JH Juan J Rosales Order Number: [id number]     Test Name Result Flag Reference   GLUCOSE,RANDM 147 mg/dL H    If the patient is fasting, the ADA then defines impaired fasting glucose as > 100 mg/dL and diabetes as > or equal to 123 mg/dL  Specimen collection should occur prior to Sulfasalazine administration due to the potential for falsely depressed results  Specimen collection should occur prior to Sulfapyridine administration due to the potential for falsely elevated results  SODIUM 138 mmol/L  136-145   POTASSIUM 5 0 mmol/L  3 5-5 3   CHLORIDE 102 mmol/L  100-108   CARBON DIOXIDE 27 mmol/L  21-32   ANION GAP (CALC) 9 mmol/L  4-13   BLOOD UREA NITROGEN 17 mg/dL  5-25   CREATININE 0 75 mg/dL  0 60-1 30   Standardized to IDMS reference method   CALCIUM 9 9 mg/dL  8 3-10 1   BILI, TOTAL 0 59 mg/dL  0 20-1 00   ALK PHOSPHATAS 70 U/L     ALT (SGPT) 38 U/L  12-78   Specimen collection should occur prior to Sulfasalazine and/or Sulfapyridine administration due to the potential for falsely depressed results  AST(SGOT) 26 U/L  5-45   Specimen collection should occur prior to Sulfasalazine administration due to the potential for falsely depressed results  ALBUMIN 3 7 g/dL  3 5-5 0   TOTAL PROTEIN 7 0 g/dL  6 4-8 2   eGFR 98 ml/min/1 73sq m     National Kidney Disease Education Program recommendations are as follows:  GFR calculation is accurate only with a steady state creatinine  Chronic Kidney disease less than 60 ml/min/1 73 sq  meters  Kidney failure less than 15 ml/min/1 73 sq  meters  VAS LOWER LIMB ARTERIAL DUPLEX, COMPLETE BILATERAL/GRAFTS 36SZD0449 09:36AM Shandra Alberto     Test Name Result Flag Reference   VAS LOWER LIMB ARTERIAL DUPLEX, COMPLETE BILATERAL/GRAFTS (Report)     [facility] REPORT   CLINICAL:   Indications: Patient presents with left 2nd toe wound  Denies claudication  Operative History   partial B/L hallux amputation   Risk Factors: The patient has history of HTN, diabetes, hyperlipidemia and   previous smoking (quit 5-10yrs ago)  Clinical   Right Pressure: 168/ mm Hg, Left Pressure: 169/ mm Hg           FINDINGS:      Segment        Rig Left                        PSV AP (cm) Impression PSV AP (cm)    Common Femoral Artery 114            131         Prox Profunda      93            80         Prox SFA        94            107         Mid SFA        113            125         Dist SFA        79            102         Proximal Pop      77   0 7 Ectatic   79   0 8    Distal Pop       70            69         Dist Post Tibial    137            99         DorsPedis        17            53                  CONCLUSION:   Impression:   RIGHT LOWER LIMB:   Evaluation shows diffuse atherosclerotic disease throughout the   femoro-popliteal and tibio-peroneal segments  Ankle/Brachial Index: 1 16, which is normal    PVR/ PPG tracings are dampened  Metatarsal pressure is unable to be obtained secondary to non-compressible   vessels  Great toe pressure of 60 mmHg, within the healing range  LEFT LOWER LIMB:   Evaluation shows diffuse atherosclerotic disease throughout the   femoro-popliteal and tibio-peroneal segments  The popliteal artery is mildly   ectatic measuring approximately 0 8 cm  Ankle/Brachial Index: 1 2, which is normal    PVR/ PPG tracings are normal    Metatarsal pressure is unable to be obtained secondary to non-compressible   vessels  Great toe pressure of 90 mmHg, within the healing range  SIGNATURE:   Electronically Signed by: Indu Garvey MD, [address] on 2017-07-18 04:26:57 PM     (1) HEMOGLOBIN A1C 50JHV1326 06:10PM Hattie Dick     Test Name Result Flag Reference   HEMOGLOBIN A1C 8 1 % H 4 2-6 3   EST  AVG   GLUCOSE 186 mg/dl       Future Appointments    Date/Time Provider Specialty Site   12/28/2017 09:00 AM Jessie Mercer, [facility] Endocrinology [facility] ENDOCRINOLOGY     Signatures   Electronically signed by : FELTON Rashid ; Sep 27 2017  2:22PM EST                       (Author)

## 2017-11-06 ENCOUNTER — ALLSCRIPTS OFFICE VISIT (OUTPATIENT)
Dept: OTHER | Facility: OTHER | Age: 62
End: 2017-11-06

## 2017-11-07 NOTE — PROGRESS NOTES
Assessment  1  Acute sinusitis (461 9) (J01 90)    Plan  Acute sinusitis    · Zithromax Z-Jason 250 MG Oral Tablet (Azithromycin); TAKE 2 TABLETS ON DAY 1  THEN TAKE 1 TABLET A DAY FOR 4 DAYS    Discussion/Summary    Patient will be started on a Z-Jason and may take Mucinex  Patient encouraged to drink plenty of fluids and rest  Patient to return to the office in 1 week or sooner vahe Osborne Possible side effects of new medications were reviewed with the patient/guardian today  The treatment plan was reviewed with the patient/guardian  The patient/guardian understands and agrees with the treatment plan      Chief Complaint  Cold sx      History of Present Illness  Cold Symptoms: Jessica Andrade presents with complaints of cold symptoms  Associated symptoms include nasal congestion,-- post nasal drainage,-- scratchy throat,-- sore throat,-- hoarseness,-- facial pressure,-- headache-- and-- fatigue, but-- no dry cough,-- no productive cough,-- no plugged ear(s),-- no ear pain,-- no wheezing,-- no shortness of breath,-- no fever-- and-- no chills  HPI: Patient started yesterday with nasal congestion productive of yellow mucus, postnasal drainage, sore throat and sinus pressure headache  He denies fever or cough  No treatment by patient  Active Problems  1  Bilateral leg edema (782 3) (R60 0)   2  Diabetes mellitus with neuropathy (250 60,357 2) (E11 40)   3  Diabetic foot ulcer (250 80,707 15) (E11 621,L97 509)   4  DM type 2, not at goal (250 00) (E11 9)   5  Easy bruising (782 9) (R23 8)   6  Ecchymosis (459 89) (R58)   7  Eczema (692 9) (L30 9)   8  Encounter for prostate cancer screening (V76 44) (Z12 5)   9  Encounter for screening colonoscopy (V76 51) (Z12 11)   10  Erectile dysfunction of non-organic origin (302 72) (F52 21)   11  Gout (274 9) (M10 9)   12  Hyperlipidemia (272 4) (E78 5)   13  Hypertension (401 9) (I10)   14  Leg swelling (729 81) (M79 89)   15  Nephropathy (583 9) (N28 9)   16   Osteoarthritis (715 90) (M19 90)   17  Peripheral arterial disease (443 9) (I73 9)   18  Preoperative examination (V72 84) (Z01 818)   19  PVCs (premature ventricular contractions) (427 69) (I49 3)   20  Rhinitis (472 0) (J31 0)   21  Systolic murmur (379 7) (K04 6)   22  Vertigo (780 4) (R42)    Family History  Father    1  Family history of Pulmonary Embolism    Social History   · Daily Cola Consumption (2  Cans/Day)   · Former smoker (F47 67) (I80 091)   · quit in 2005   had smoked 10 years   · Never Drank Alcohol    Surgical History  1  History of Arthroscopy Knee Left   2  History of Arthroscopy Knee Right   3  History of Arthroscopy Shoulder Left   4  History of Surgery Left Foot Amputation [facility] Partial   5  History of Surgery Right Foot Amputation [facility] Partial    Current Meds   1  Accu-Chek Guide In Vitro Strip; USE TO TEST TWICE DAILY; Therapy: 18FFP8720 to (([phone])  Requested for: 16DJL4024; Last   Rx:11Oct2017 Ordered   2  Allopurinol 300 MG Oral Tablet; TAKE 1 TABLET DAILY; Therapy: 38NOI8009 to (Evaluate:28Ycf7031)  Requested for: 35RXS7024; Last   Rx:85Ixh4792 Ordered   3  Aspirin 81 MG CAPS; Therapy: (Recorded:76Dmt4536) to Recorded   4  BD Pen Needle Zenaida U/F 32G X 4 MM Miscellaneous; USE AS DIRECTED WITH   INSULIN PENS; Therapy: 13AAX5895 to (11 119 365)  Requested for: 29MXE1746; Last   Rx:09Oct2017 Ordered   5  Dexamethasone 1 MG Oral Tablet; Take one tablet at 11 pm;   Therapy: 45RDH6381 to (Evaluate:48Fxw9353)  Requested for: 34XTG6141; Last   Rx:91Daj8927 Ordered   6  Dupixent 300 MG/2ML Subcutaneous Solution Prefilled Syringe; Inject 2 syringes subq   on day one, then 1 syringe every 2 weeks starting on day 15; Therapy: (Martine Bradford) to Recorded   7  Furosemide 40 MG Oral Tablet; TAKE 1 TABLET DAILY AS DIRECTED; Therapy: 81JCG0946 to (Evaluate:22Yqb1218)  Requested for: 85GYH7154; Last   Rx:25Jun2017 Ordered   8   Glimepiride 4 MG Oral Tablet; take 1 tablet twice a day;   Therapy: 85QVJ3531 to (Evaluate:27Jan2018)  Requested for: 54GEK6198; Last   Rx:72Ihf1456 Ordered   9  HydrOXYzine HCl - 10 MG Oral Tablet; Therapy: ([id number]) to Recorded   10  Levitra 20 MG Oral Tablet; TAKE AS DIRECTED; Therapy: 41RKQ5472 to (Evaluate:13Jun2015); Last Rx:46Uyz7036 Ordered   11  Losartan Potassium-HCTZ 100-25 MG Oral Tablet; take 1 tablet by mouth every day; Therapy: 52UVN7026 to (Evaluate:01Jan2018)  Requested for: 73GMQ9862; Last    Rx:03Oct2017 Ordered   12  Meclizine HCl - 25 MG Oral Tablet; TAKE 1 TABLET 3 TIMES DAILY AS NEEDED; Therapy: 79GPA0334 to (Evaluate:26Aug2017)  Requested for: 10HNK9898; Last    Rx:92Emu1301 Ordered   13  MetFORMIN HCl - 500 MG Oral Tablet; take 2 tablets by mouth twice a day; Therapy: 78QEZ8125 to (Evaluate:09Feb2018)  Requested for: 71Hdn6198; Last    Rx:33Mcb4193 Ordered   14  Metoprolol Tartrate 100 MG Oral Tablet; take one tablet daily; Therapy: 69SJY3053 to (Evaluate:01Jan2018)  Requested for: 61XVT3872; Last    Rx:03Oct2017 Ordered   13  Multi Vitamin Mens Oral Tablet; TAKE 1 TABLET DAILY; Therapy: 00PGJ3089 to Recorded   16  Onglyza 5 MG Oral Tablet; take 1 tablet by mouth every day; Therapy: 49MDC9719 to (TKGDEBMS:23YRS7653)  Requested for: 29Kbl4079; Last    Rx:70Lgi8447 Ordered   17  Simvastatin 40 MG Oral Tablet; Take 1 tablet daily; Therapy: 36CHI1836 to [id number])  Requested for: 31Oct2017; Last    Rx:31Oct2017 Ordered   18  Vitamin D3 1000 UNIT Oral Capsule; TAKE AS DIRECTED; Therapy: 12XZS5505 to Recorded   19  Xultophy 100-3 6 UNIT-MG/ML Subcutaneous Solution Pen-injector; inect 30 units daily; Therapy: 20JNM2460 to (HLERKYZY:46NOO9105)  Requested for: 81PFP0955; Last    Rx:78Ojg3952 Ordered    Allergies  1  LamISIL TABS   2   Latex Gloves MISC    Vitals   Recorded: 51MVT4034 12:06PM Recorded: 12MUD1925 11:36AM   Temperature  98 7 F   Heart Rate 84    Respiration 16    Systolic 191 Diastolic 72    Height  5 ft 10 in   Weight  219 lb    BMI Calculated  31 42   BSA Calculated  2 17     Physical Exam    Constitutional   General appearance: No acute distress, well appearing and well nourished  Eyes   Conjunctiva and lids: No swelling, erythema, or discharge  Ears, Nose, Mouth, and Throat   External inspection of ears and nose: Normal     Otoscopic examination: Tympanic membrance translucent with normal light reflex  Canals patent without erythema  Nasal mucosa, septum, and turbinates: Abnormal   There was a purulent discharge from both nares  The bilateral nasal mucosa was edematous  Oropharynx: Abnormal  -- Postnasal drainage and erythema  Pulmonary   Respiratory effort: No increased work of breathing or signs of respiratory distress  Auscultation of lungs: Clear to auscultation, equal breath sounds bilaterally, no wheezes, no rales, no rhonci  Cardiovascular   Auscultation of heart: Normal rate and rhythm, normal S1 and S2, without murmurs  Examination of extremities for edema and/or varicosities: Normal     Abdomen   Abdomen: Non-tender, no masses  Lymphatic   Palpation of lymph nodes in neck: No lymphadenopathy  Musculoskeletal   Gait and station: Normal     Skin   Skin and subcutaneous tissue: Normal without rashes or lesions      Psychiatric   Orientation to person, place and time: Normal     Mood and affect: Normal          Future Appointments    Date/Time Provider Specialty Site   11/08/2017 01:00 PM Sofía Saldaña RD [facility]   11/15/2017 01:00 PM Sofía Saldaña RD [facility]   11/13/2017 01:00 PM Verenice Baez RD, LDN [facility]   12/28/2017 09:00 AM Georgiana Titus [facility]     Signatures   Electronically signed by : Rex Stockton DO; Nov 6 2017 12:11PM EST                       (Author)

## 2017-11-08 ENCOUNTER — ALLSCRIPTS OFFICE VISIT (OUTPATIENT)
Dept: OTHER | Facility: OTHER | Age: 62
End: 2017-11-08

## 2017-11-13 ENCOUNTER — ALLSCRIPTS OFFICE VISIT (OUTPATIENT)
Dept: OTHER | Facility: OTHER | Age: 62
End: 2017-11-13

## 2017-12-06 ENCOUNTER — ALLSCRIPTS OFFICE VISIT (OUTPATIENT)
Dept: OTHER | Facility: OTHER | Age: 62
End: 2017-12-06

## 2017-12-06 ENCOUNTER — GENERIC CONVERSION - ENCOUNTER (OUTPATIENT)
Dept: OTHER | Facility: OTHER | Age: 62
End: 2017-12-06

## 2017-12-11 ENCOUNTER — ALLSCRIPTS OFFICE VISIT (OUTPATIENT)
Dept: OTHER | Facility: OTHER | Age: 62
End: 2017-12-11

## 2018-01-02 ENCOUNTER — ALLSCRIPTS OFFICE VISIT (OUTPATIENT)
Dept: OTHER | Facility: OTHER | Age: 63
End: 2018-01-02

## 2018-01-02 DIAGNOSIS — E78.5 HYPERLIPIDEMIA: ICD-10-CM

## 2018-01-02 DIAGNOSIS — I10 ESSENTIAL (PRIMARY) HYPERTENSION: ICD-10-CM

## 2018-01-02 DIAGNOSIS — E11.40 TYPE 2 DIABETES MELLITUS WITH DIABETIC NEUROPATHY (HCC): ICD-10-CM

## 2018-01-04 NOTE — PROGRESS NOTES
Assessment   1  Diabetes mellitus with neuropathy (250 60,357 2) (E11 40)   2  Diabetic foot ulcer (250 80,707 15) (E11 621,L97 509)   3  DM type 2, not at goal (250 00) (E11 9)   4  Hypertension (401 9) (I10)   5  Hyperlipidemia (272 4) (E78 5)    Plan   Diabetes mellitus with neuropathy    · (1) HEMOGLOBIN A1C; Status:Active; Requested KANDACE:[id number]; Perform:[facility]; WUU:29FGC5514;WUBVIDH; For:Diabetes mellitus with neuropathy; Ordered By:Van Benavides;  Hyperlipidemia    · (1) LIPID PANEL, FASTING; Status:Active; Requested GXW:22LME7512; Perform:[facility]; IJE:73EEV7458;LWEQLVE;URL:JCFJYKZIJTYVSE; Ordered By:Taylor Benavides;  Hypertension    · (1) COMPREHENSIVE METABOLIC PANEL; Status:Active; Requested QPZ:70KWB1410; Perform:[facility]; OYR:81TWZ2111;HMWECNX;BMM:QIILBBHFUONW; Ordered By:Taylor Benavides;   · (1) MICROALBUMIN CREATININE RATIO, RANDOM URINE; Status:Active; Requested    JDS:41EWV6464; Perform:[facility]; VCJ:16MOB1386;BYHBPZC;MIGUE:MVLGGYXIQKAQ; Ordered By:Taylor Benavides;   · Follow-up visit in 3 months Evaluation and Treatment  Follow-up  Status: Complete     Done: 85QXF0933   Ordered; For: Hypertension; Ordered By: Deidre Vallecillo Performed:  Due: 99IWE8476; Last Updated By: Steve ; 1/2/2018 8:17:46 AM    Discussion/Summary   Discussion Summary:    1  Type 2 diabetes with peripheral neuropathy: By his report, his blood sugars are well controlled though he presents with no blood sugar records or meter for download today in the office  He denies any recent episodes of hypoglycemia  He has been attending medical nutrition therapy and diabetic education  He will continue to check his blood sugars 2-3 times daily and for the record to the office in 1-2 weeks for review and adjustment, if necessary  He follows Dr Christina Herrera for regular podiatry every 6 weeks and complains of the beginnings of a blister   He recently had his appointment moved up to a sooner date for evaluation  Check hemoglobin A1c  Hypertension: His blood pressure is elevated in the office today  Continue to monitor  Check comprehensive metabolic panel and urine microalbumin  Hyperlipidemia: Continue simvastatin  Check fasting lipid profile  Counseling Documentation With Imm: The patient was counseled regarding diagnostic results,-- instructions for management,-- risk factor reductions,-- patient and family education,-- impressions,-- risks and benefits of treatment options,-- importance of compliance with treatment  Medication SE Review and Pt Understands Tx: Possible side effects of new medications were reviewed with the patient/guardian today  The treatment plan was reviewed with the patient/guardian  The patient/guardian understands and agrees with the treatment plan      Chief Complaint   Chief Complaint Free Text Note Form: Follow Up      History of Present Illness   HPI: 78-year-old male presents for follow-up of type 2 diabetes with peripheral neuropathy, hypertension and hyperlipidemia  He states that he has been diagnosed with type 2 diabetes for 12 years  He is currently checking his blood sugars 2 times daily  He presents with no meter or blood sugar records for review  H states his morning fasting blood sugars are in the 110-110 range and dinner 120-130 range  He denies any recent symptoms of hypoglycemia, polydipsia, polyuria or polyphagia  His most recent hemoglobin A1c from July 22, 2017 is 8 1  His current diabetes medication regimen is as follows: 100-3 6-30 units daily 5 mg daily 1000 mg twice daily 4 mg twice daily states that he is up-to-date with his annual diabetic eye exam  Denies retinopathy  He has a history of toe amputations on the right and left feet and follows Podiatry for regular diabetic foot care every 6 weeks  his hypertension, he takes losartan HCTZ 100-25 mg daily and metoprolol 100 mg daily     hyperlipidemia is treated with simvastatin 40 mg daily  Review of Systems   Endo Adult ROS Male Established v2 - [facility]:      Constitutional/General: no recent weight gain,-- no recent weight loss,-- no poor energy/fatigue,-- no increased energy level,-- no insomnia/sleep problems,-- no fever-- and-- no feeling weak  Heart: high blood pressure, but-- no chest pain/tightness,-- no rapid/racing heart rate-- and-- no palpitations  Genitourinary - Urinary no frequent urination,-- no excess urination-- and-- no urinating during the night  Eyes: no blurred vision,-- no double vision,-- no bulging eyes,-- no gritty/scratchy eyes-- and-- no excessive tearing  Mouth / Throat: no hoarseness-- and-- no difficulty swallowing  Neck: no lumps,-- no swollen glands,-- no neck pain,-- no neck stiffness-- and-- no enlarged thyroid  Respiratory: no wheezing,-- no asthma-- and-- no persistent cough  Musculoskeletal: no muscle aches/pain,-- joint aches/pain-- and-- no muscle weakness  Skin & Hair: dry skin,-- no acne,-- the hair texture was not oily,-- no hair loss-- and-- no excessive hair growth  Gastrointestinal: no constipation,-- no diarrhea,-- no waking at night to drink-- and-- no stomach ache  Neurological: no blackouts,-- no weakness-- and-- no tremors  Genital: no testicular pain,-- no testicular lumps/bumps/mass-- and-- performs monthly testicular exam       Endocrine: no feeling hot frequently,-- no feeling cold frequently,-- no shifts between feeling hot and cold,-- no cold hands or feet,-- no excessive sweating,-- no thyroid problems,-- blood sugar problems,-- no excessive thirst,-- no excessive hunger,-- no change in shoe size,-- no nausea or vomiting-- and-- no shaky hands  ROS Reviewed:    ROS reviewed  Active Problems   1  Acute sinusitis (461 9) (J01 90)   2  Bilateral leg edema (782 3) (R60 0)   3  Diabetes mellitus with neuropathy (250 60,357 2) (E11 40)   4   Diabetic foot ulcer (250 80,707 15) (E11 621,L97 509)   5  DM type 2, not at goal (250 00) (E11 9)   6  Easy bruising (782 9) (R23 8)   7  Ecchymosis (459 89) (R58)   8  Eczema (692 9) (L30 9)   9  Encounter for prostate cancer screening (V76 44) (Z12 5)   10  Encounter for screening colonoscopy (V76 51) (Z12 11)   11  Erectile dysfunction of non-organic origin (302 72) (F52 21)   12  Gout (274 9) (M10 9)   13  Hyperlipidemia (272 4) (E78 5)   14  Hypertension (401 9) (I10)   15  Leg swelling (729 81) (M79 89)   16  Nephropathy (583 9) (N28 9)   17  Osteoarthritis (715 90) (M19 90)   18  Peripheral arterial disease (443 9) (I73 9)   19  Preoperative examination (V72 84) (Z01 818)   20  PVCs (premature ventricular contractions) (427 69) (I49 3)   21  Rhinitis (472 0) (J31 0)   22  Systolic murmur (472 1) (K10 6)   23  Vertigo (780 4) (R42)    Past Medical History   Active Problems And Past Medical History Reviewed: The active problems and past medical history were reviewed and updated today  Surgical History   1  History of Arthroscopy Knee Left   2  History of Arthroscopy Knee Right   3  History of Arthroscopy Shoulder Left   4  History of Surgery Left Foot Amputation [facility] Partial   5  History of Surgery Right Foot Amputation [facility] Partial  Surgical History Reviewed: The surgical history was reviewed and updated today  Family History   Father    1  Family history of Pulmonary Embolism  Family History Reviewed: The family history was reviewed and updated today  Social History    · Daily Cola Consumption (2  Cans/Day)   · Former smoker (G20 87) (T29 478)   · Never Drank Alcohol  Social History Reviewed: The social history was reviewed and updated today  Current Meds    1  Accu-Chek Guide In Vitro Strip; USE TO TEST TWICE DAILY; Therapy: 53HYG6796 to ([phone])  Requested for: 21WCQ5525; Last     Rx:11Oct2017 Ordered   2   Allopurinol 300 MG Oral Tablet; TAKE 1 TABLET DAILY; Therapy: 91RUH6050 to (Evaluate:13Dec2017)  Requested for: 23GEA5910; Last     Rx:16Jun2017 Ordered   3  Aspirin 81 MG CAPS; Therapy: (Recorded:86Lhz6099) to Recorded   4  BD Pen Needle Zenaida U/F 32G X 4 MM Miscellaneous; USE AS DIRECTED WITH     INSULIN PENS; Therapy: 69XPL0882 to ([phone])  Requested for: 46SUZ5068; Last     Rx:09Oct2017 Ordered   5  Dupixent 300 MG/2ML Subcutaneous Solution Prefilled Syringe; Inject 2 syringes subq     on day one, then 1 syringe every 2 weeks starting on day 15; Therapy: (Ranjana Stark) to Recorded   6  Glimepiride 4 MG Oral Tablet; take 1 tablet twice a day; Therapy: 49PNI3838 to (Evaluate:11Jun2018)  Requested for: 61Fqo0241; Last     Rx:93Zch8466 Ordered   7  Losartan Potassium-HCTZ 100-25 MG Oral Tablet; take 1 tablet by mouth every day; Therapy: 07UDJ1371 to (Evaluate:13Mar2018)  Requested for: 86Uez7214; Last     Rx:18Pon9566 Ordered   8  MetFORMIN HCl - 500 MG Oral Tablet; take 2 tablets by mouth twice a day; Therapy: 71RIV8551 to (Evaluate:11Jun2018)  Requested for: 07Rri0906; Last     Rx:02Vfr1147 Ordered   9  Metoprolol Tartrate 100 MG Oral Tablet; take one tablet daily; Therapy: 21TVY3604 to (Evaluate:13Mar2018)  Requested for: 93Ryj5915; Last     Rx:37Dzu5396 Ordered   10  Multi Vitamin Mens Oral Tablet; TAKE 1 TABLET DAILY; Therapy: 19XHU0722 to Recorded   11  Mycophenolate Mofetil 500 MG Oral Tablet; 1 tab TID Recorded   12  Onglyza 5 MG Oral Tablet; take 1 tablet by mouth every day; Therapy: 32AJF9876 to (Evaluate:11Jun2018)  Requested for: 76Dyv7298; Last      Rx:00Wkh0225 Ordered   13  Simvastatin 40 MG Oral Tablet; Take 1 tablet daily; Therapy: 58JPS1881 to [phone])  Requested for: 74AYW0309; Last      Rx:31Oct2017; Status: ACTIVE - Renewal Denied Ordered   14  Vitamin D3 1000 UNIT Oral Capsule; TAKE AS DIRECTED; Therapy: 52KYB6178 to Recorded   15   Xultophy 100-3 6 UNIT-MG/ML Subcutaneous Solution Pen-injector; inect 30 units daily; Therapy: 42RZY9069 to (Jaleesa Abdi)  Requested for: 27HTH8978; Last      Rx:29Nov2017 Ordered  Medication List Reviewed: The medication list was reviewed and updated today  Allergies   1  Invokana TABS   2  LamISIL TABS   3  Latex Gloves MISC  4  Other    Vitals   Vital Signs    Recorded: 02Jan2018 07:49AM   Heart Rate 84   Systolic 300   Diastolic 94   Height 5 ft 10 in   Weight 219 lb 0 48 oz   BMI Calculated 31 43   BSA Calculated 2 17     Physical Exam        Constitutional      General appearance: No acute distress, well appearing and well nourished  Eyes      Conjunctiva and lids: No swelling, erythema, or discharge  Pupils: Equal, round and reactive to light  The sclera are anicteric  Extraocular movements are intact  Ears, Nose, Mouth, and Throat      External inspection of ears, nose and lips: Normal        Oropharynx: Normal with no erythema, edema, exudate or lesions  Exam of Head: The head is atraumatic and normocephalic  Neck: The neck is supple  The thyroid is normal in size with no palpable nodules  Pulmonary      Respiratory effort: No increased work of breathing or signs of respiratory distress  Auscultation of lungs: Clear to auscultation bilaterally with normal chest expansion  Cardiovascular      Auscultation of heart: Normal rate and rhythm with no murmurs, gallops or rubs  Examination of extremities for edema and/or varicosities: Normal        Examination of pulses: Dorsalis pedal pulses are +2 and equal bilaterally  Examination of Carotids: No bruits  Abdomen      Abdomen: Abdomen is soft, non-tender with normal bowel sounds  Liver and spleen: No hepatomegaly or splenomegaly  Lymphatic      Palpation of lymph nodes: No supraclavicular or suboccipital lymphadenopathy         Musculoskeletal      Gait and station: Normal        Digits and nails: Normal without clubbing or cyanosis  Inspection/palpation of joints, bones, and muscles: Muscle bulk and tone is normal        Skin      Skin and subcutaneous tissue: Abnormal  -- Dry Skin/Eczema  Neurologic      Cranial nerves: Cranial nerves 2-12 intact  Reflexes: 2+ and symmetric  Sensation: No sensory loss  Motor Strength: Strength is 5/5 bilaterally         Psychiatric      Orientation to person, place and time: Normal        Mood and affect: Affect and attention span are normal         Regular podiatry      Results/Data   DSMT/MNT Time Record 25LBW4137 12:00AM Merari Primas      Test Name Result Flag Reference   Date of Service 12/11/17     Start - Stop Time 6pm-8pm     Total MInutes 120min     Group Or Individual Instruction DSMT-G3        DSMT/MNT Time Record 36QWD4964 12:00AM Merari Primas      Test Name Result Flag Reference   Date of Service 12/6/17     Start - Stop Time 6pm-8pm     Total MInutes 2hrs     Group Or Individual Instruction DSMT-G2        DSMT/MNT Time Record 09GFV0673 05:39PM Bob Tom      Test Name Result Flag Reference   Date of Service 11/13/2017     Start - Stop Time 1:00-3:00PM     Total MInutes 120 minutes     Group Or Individual Instruction DSMT-G4        DSMT/MNT Time Record 27LMO9488 03:43PM Mabel Brooks      Test Name Result Flag Reference   Date of Service 11/8/17     Start - Stop Time 1-3pm     Total MInutes 120     Group Or Individual Instruction DSMT-G        DSMT/MNT Time Record 93UYD5423 03:03PM Bob Tom      Test Name Result Flag Reference   Date of Service 10/17/2017     Start - Stop Time 11:00-11:45AM     Total MInutes 45 minutes     Group Or Individual Instruction MNT-I        (1) CORTISOL AM SPECIMEN 64JKC5319 08:06AM Cory Roberts   REPORT COMMENT:     FASTING:YES      Test Name Result Flag Reference   CORTISOL, A M  0 6 mcg/dL L    Reference Range     8 a m  (7-9 a m ) Specimen: 4 0-22 0      DSMT/MNT Time Record [id number] 12: Jil Plummer      Test Name Result Flag Reference   Date of Service 10/9/2017     Start - Stop Time 10:50-11:45PM     Total MInutes 55 minutes     Group Or Individual Instruction DSMT-I        DSMT/MNT Time Record 52HMJ2107 02:39PM Young Babcock      Test Name Result Flag Reference   Date of Service 9/27/19     Start - Stop Time 1:45-2:15     Total MInutes 30     Group Or Individual Instruction DSMT-I        (1) COMPREHENSIVE METABOLIC PANEL 22VGL4769 39:48MR Arthor Homans Order Number: [id number]      Test Name Result Flag Reference   GLUCOSE,RANDM 147 mg/dL H    If the patient is fasting, the ADA then defines impaired fasting glucose as > 100 mg/dL and diabetes as > or equal to 123 mg/dL  Specimen collection should occur prior to Sulfasalazine administration due to the potential for falsely depressed results  Specimen collection should occur prior to Sulfapyridine administration due to the potential for falsely elevated results  SODIUM 138 mmol/L  136-145   POTASSIUM 5 0 mmol/L  3 5-5 3   CHLORIDE 102 mmol/L  100-108   CARBON DIOXIDE 27 mmol/L  21-32   ANION GAP (CALC) 9 mmol/L  4-13   BLOOD UREA NITROGEN 17 mg/dL  5-25   CREATININE 0 75 mg/dL  0 60-1 30   Standardized to IDMS reference method   CALCIUM 9 9 mg/dL  8 3-10 1   BILI, TOTAL 0 59 mg/dL  0 20-1 00   ALK PHOSPHATAS 70 U/L     ALT (SGPT) 38 U/L  12-78   Specimen collection should occur prior to Sulfasalazine and/or Sulfapyridine administration due to the potential for falsely depressed results  AST(SGOT) 26 U/L  5-45   Specimen collection should occur prior to Sulfasalazine administration due to the potential for falsely depressed results     ALBUMIN 3 7 g/dL  3 5-5 0   TOTAL PROTEIN 7 0 g/dL  6 4-8 2   eGFR 98 ml/min/1 73sq m     National Kidney Disease Education Program recommendations are as follows:     GFR calculation is accurate only with a steady state creatinine     Chronic Kidney disease less than 60 ml/min/1 73 sq  meters     Kidney failure less than 15 ml/min/1 73 sq  meters  (1) HEMOGLOBIN A1C 89ZSR0225 06:10PM Olivier Mcintyre      Test Name Result Flag Reference   HEMOGLOBIN A1C 8 1 % H 4 2-6 3   EST  AVG  GLUCOSE 186 mg/dl        (1) LIPID PANEL, FASTING 24ORE5835 09:54AM Shireen Carmonaronnie Order Number: [id number]      Test Name Result Flag Reference   CHOLESTEROL 155 mg/dL     HDL,DIRECT 44 mg/dL  40-60   Specimen collection should occur prior to Metamizole administration due to the potential for falsely depressed results  LDL CHOLESTEROL CALCULATED 64 mg/dL  0-100   Triglyceride:               Normal              <150 mg/dl          Borderline High    150-199 mg/dl          High               200-499 mg/dl          Very High          >499 mg/dl     Cholesterol:               Desirable        <200 mg/dl         Borderline High  200-239 mg/dl         High             >239 mg/dl     HDL Cholesterol:              High    >59 mg/dL         Low     <41 mg/dL     LDL CALCULATED:          This screening LDL is a calculated result  It does not have the accuracy of the Direct Measured LDL in the monitoring of patients with hyperlipidemia and/or statin therapy  Direct Measure LDL (MOY769) must be ordered separately in these patients  TRIGLYCERIDES 237 mg/dL H <=150   Specimen collection should occur prior to N-Acetylcysteine or Metamizole administration due to the potential for falsely depressed results        (1) VITAMIN D 25-HYDROXY 01DKM2608 09:54AM Shireen Carmonaronnie Order Number: [id number]      Test Name Result Flag Reference   VIT D 25-HYDROX 30 6 ng/mL  30 0-100 0   This assay is a certified procedure of the CDC Vitamin D Standardization Certification Program (VDSCP)           Deficiency <20ng/ml      Insufficiency 20-30ng/ml      Sufficient  ng/ml           *Patients undergoing fluorescein dye angiography may retain small amounts of fluorescein in the body for 48-72 hours post procedure  Samples containing fluorescein can produce falsely elevated Vitamin D values  If the patient had this procedure, a specimen should be resubmitted post fluorescein clearance  Health Management   DM type 2, not at goal   (1) HEMOGLOBIN A1C; every 3 months; Last 33JKY8565; Next Due: 18MMK6535; Overdue  *VB - Foot Exam; every 1 year; Last 39GWV2412; Next Due: 40KSQ2341; Active  Hyperlipidemia   (1) HEPATIC FUNCTION PANEL; every 6 months; Last 57FLS9076; Next Due: 87Bdt8151; Overdue  (1) LIPID PANEL, FASTING; every 1 year; Last 53ARC7898; Next Due: 79RWH3083;  Active    Signatures    Electronically [facility] : [id number]; Jan 2 2018  8:20AM EST                       (Author)     Electronically signed by : FELTON Mejia ; Rohit  3 2018 10:39AM EST

## 2018-01-09 NOTE — PROGRESS NOTES
Plan    1  DSMT/MNT Time Record; Status:Complete;   Done: 46FCD2155 03:43PM    Discussion/Summary    PATIENT EDUCATION RECORD   Living Well with Diabetes Class 1 Education Content: What is diabetes, Types of diabetes, How is diabetes diagnosed, Management skills, Role of exercise, Glucose monitoring, Target range goals        Chief Complaint  Patient attended LWD class 1 this afternoon  End of Encounter Meds    1  Zithromax Z-Jason 250 MG Oral Tablet (Azithromycin); TAKE 2 TABLETS ON DAY 1 THEN   TAKE 1 TABLET A DAY FOR 4 DAYS; Therapy: 85PIH7613 to (Last VR:04AEH3392)  Requested for: 31EMV2780 Ordered    2  Furosemide 40 MG Oral Tablet; TAKE 1 TABLET DAILY AS DIRECTED; Therapy: 75VQM5684 to (Evaluate:33Hed3718)  Requested for: 02GCP4655; Last   Rx:82Yui6384 Ordered    3  BD Pen Needle Zenaida U/F 32G X 4 MM Miscellaneous; USE AS DIRECTED WITH   INSULIN PENS; Therapy: 01DBG4720 to (05 [phone] 30)  Requested for: 50YSX4128; Last   Rx:26Eyc9140 Ordered   4  Xultophy 100-3 6 UNIT-MG/ML Subcutaneous Solution Pen-injector; inect 30 units daily; Therapy: 83BJK3096 to (SZSQTEDW:26CZS6978)  Requested for: 83EUD7497; Last   Rx:43Xki9939 Ordered    5  Accu-Chek Guide In Vitro Strip; USE TO TEST TWICE DAILY; Therapy: 02OUC4970 to (66 91 28)  Requested for: 78LIK3180; Last   Rx:23Xhr8093 Ordered    6  Glimepiride 4 MG Oral Tablet; take 1 tablet twice a day; Therapy: 55JQW3973 to (OTEJDDFF:64LHE8544)  Requested for: 76Sbh2755; Last   Rx:76Ela1992 Ordered   7  MetFORMIN HCl - 500 MG Oral Tablet; take 2 tablets by mouth twice a day; Therapy: 81TFH5949 to (Evaluate:07Rjv9103)  Requested for: 76Beo4750; Last   Rx:94Ydf1685 Ordered   8  Onglyza 5 MG Oral Tablet; take 1 tablet by mouth every day; Therapy: 38DTM5838 to (XTYVYIOK:51WFY0123)  Requested for: 76Vsk6364; Last   Rx:68Shw1326 Ordered    9  Dupixent 300 MG/2ML Subcutaneous Solution Prefilled Syringe;  Inject 2 syringes subq   on day one, then 1 syringe every 2 weeks starting on day 15; Therapy: (Ester Karimi) to Recorded    10  Levitra 20 MG Oral Tablet; TAKE AS DIRECTED; Therapy: 31AKA5390 to (Evaluate:13Jun2015); Last Rx:15Bvy8245 Ordered    11  Allopurinol 300 MG Oral Tablet; TAKE 1 TABLET DAILY; Therapy: 47JGR1105 to (Evaluate:73Jmq1842)  Requested for: 44ZGF0325; Last    Rx:16Jun2017 Ordered    12  Simvastatin 40 MG Oral Tablet; Take 1 tablet daily; Therapy: 86FCU9118 to 42-33-24-12)  Requested for: 31Oct2017; Last    Rx:31Oct2017 Ordered    13  Losartan Potassium-HCTZ 100-25 MG Oral Tablet; take 1 tablet by mouth every day; Therapy: 29AFU8857 to (Evaluate:01Jan2018)  Requested for: 51YUX6124; Last    Rx:03Oct2017 Ordered   15  Metoprolol Tartrate 100 MG Oral Tablet; take one tablet daily; Therapy: 69PRB1586 to (Evaluate:01Jan2018)  Requested for: 29YYF1264; Last    Rx:03Oct2017 Ordered    13  Aspirin 81 MG CAPS; Therapy: (Recorded:96Xlo0266) to Recorded   16  HydrOXYzine HCl - 10 MG Oral Tablet; Therapy: ([phone]) to Recorded   17  Multi Vitamin Mens Oral Tablet; TAKE 1 TABLET DAILY; Therapy: 98SFC5509 to Recorded   18  Vitamin D3 1000 UNIT Oral Capsule; TAKE AS DIRECTED;     Therapy: 39OCL9935 to Recorded    Future Appointments    Date/Time Provider Specialty Site   11/15/2017 01:00 PM Bárbara Juarez RD Diabetes Educator [facility] ENDOCRINOLOGY   11/13/2017 01:00 PM Stevie Cee RD, LDN Diabetes Educator [facility] ENDOCRINOLOGY   12/28/2017 09:00 AM Raghav Templeton [facility] Endocrinology [facility] END[facility]CR[facility]OLOGY     Signatures   Electronically signed by : Brent Pizarro RD; Nov 8 2017  3:45PM EST                       (Author)    Electronically signed by : Brent Pizarro RD; Nov 8 2017  3:46PM EST                       (Author)    Electronically signed by : FELTON Yo ; Nov 10 2017 12:33PM EST

## 2018-01-10 NOTE — MISCELLANEOUS
Message   Recorded as Task   Date: 04/11/2017 08:54 AM, Created By: Estela Younger   Task Name: Follow Up   Assigned To: Alexi Mansfield   Regarding Patient: Yehuda Palencia, Status: Active   Comment:    Estela Younger - 11 Apr 2017 8:54 AM     TASK CREATED  Caller: Melina Braga; General Medical Question; [phone]  Pt was seen in office 4-10-17 and was instructed to d/c invokaray Cast [facility] called and asked if we can call in a 80 script for his previous diabetic medication of farxiga? She stated that Sims Nageotte seemed to do very well on that medication  Please advise  [facility] [address] [place] Apr 2017 12:56 PM     TASK EDITED  Phone call to patient  He states he is feeling significantly better today and no further diarrhea and is tolerating fluids well and does not feel dehydrated  Labs are pending  Recommend patient remain off Invokana and Do Rosine this week and to start Farxiga 5 mg daily next week  Prescription sent to [facility] pharmacy in [facility]   Electronically signed by : Fide Vera DO;  Apr 11 2017 12:56PM EST                       (Author)

## 2018-01-10 NOTE — RESULT NOTES
Discussion/Summary   Phone call to patient discussed lab results  Patient is feeling significantly better overall and diarrhea has completely resolved  Patient to continue present treatment and will recheck CBC next week  Verified Results  (Q) CBC (INCLUDES DIFF/PLT) (REFL) 10Apr2017 12:00AM [employer]     Test Name Result Flag Reference   WHITE BLOOD CELL COUNT 14 4 Thousand/uL H 3 8-10 8   RED BLOOD CELL COUNT 4 36 Million/uL  4 20-5 80   HEMOGLOBIN 13 2 g/dL  13 2-17 1   HEMATOCRIT 39 7 %  38 5-50 0   MCV 91 1 fL  80 0-100 0   MCH 30 4 pg  27 0-33 0   MCHC 33 3 g/dL  32 0-36 0   RDW 14 3 %  11 0-15 0   PLATELET COUNT 345 Thousand/uL H 140-400   MPV 8 4 fL  7 5-12 5   ABSOLUTE NEUTROPHILS 70467 cells/uL H 0337-4798   ABSOLUTE LYMPHOCYTES 1224 cells/uL  850-3900   ABSOLUTE MONOCYTES 662 cells/uL  200-950   ABSOLUTE EOSINOPHILS 230 cells/uL     ABSOLUTE BASOPHILS 29 cells/uL  0-200   NEUTROPHILS 85 1 %     LYMPHOCYTES 8 5 %     MONOCYTES 4 6 %     EOSINOPHILS 1 6 %     BASOPHILS 0 2 %       (Q) COMPREHENSIVE METABOLIC PNL W/ADJUSTED CALCIUM [id number] 12:00AM [employer]     Test Name Result Flag Reference   GLUCOSE 244 mg/dL H 65-99   Fasting reference interval     For someone without known diabetes, a glucose  value >125 mg/dL indicates that they may have  diabetes and this should be confirmed with a  follow-up test    UREA NITROGEN (BUN) 19 mg/dL  7-25   CREATININE 1 02 mg/dL  0 70-1 25   For patients >52years of age, the reference limit  for Creatinine is approximately 13% higher for people  identified as -American  eGFR NON-AFR   AMERICAN 78 mL/min/1 73m2  > OR = 60   eGFR AFRICAN AMERICAN 90 mL/min/1 73m2  > OR = 60   BUN/CREATININE RATIO   8-39   NOT APPLICABLE (calc)   SODIUM 132 mmol/L L 135-146   POTASSIUM 5 0 mmol/L  3 5-5 3   CHLORIDE 96 mmol/L L    CARBON DIOXIDE 23 mmol/L  20-31   CALCIUM 9 3 mg/dL  8 6-10 3   CALCIUM (ADJUSTED FOR$ALBUMIN) 9 9 mg/dL (calc) 8  6-10 2   PROTEIN, TOTAL 6 9 g/dL  6 1-8 1   ALBUMIN 3 5 g/dL L 3 6-5 1   GLOBULIN 3 4 g/dL (calc)  1 9-3 7   ALBUMIN/GLOBULIN RATIO 1 0 (calc)  1 0-2 5   BILIRUBIN, TOTAL 0 7 mg/dL  0 2-1 2   ALKALINE PHOSPHATASE 71 U/L     AST 22 U/L  10-35   ALT 21 U/L  9-46     (Q) CULTURE, STOOL, SAL/SHIG/CAMPY AND SHIGA TOXINS EIA W/RFL E COLI O157 CULT 10Apr2017 12:00AM Hattie Half     Test Name Result Flag Reference   SHIGA TOXINS, EIA W/RFL$TO E COLI O157 CULTURE      SHIGA TOXINS, EIA W/RFL TO E COLI O157 CULTURE         MICRO NUMBER:      [id number]    TEST STATUS:       FINAL    SPECIMEN SOURCE:   NOT GIVEN    SPECIMEN QUALITY:  INADEQUATE    RESULT:            Test not performed  No suitable specimen received  CULTURE, STOOL, SAL/SHIG/CAMPY AND SHIGA TOXINS EIA W/RFL E COLI O157 CULT      CAMPYLOBACTER, CULTURE         MICRO NUMBER:      [id number]    TEST STATUS:       FINAL    SPECIMEN SOURCE:   NOT GIVEN    SPECIMEN QUALITY:  INADEQUATE    RESULT:            Test not performed  No suitable specimen received  Test not performed  No suitable specimen received  CULTURE, STOOL, SAL/SHIG/CAMPY AND SHIGA TOXINS EIA W/RFL E COLI O157 CULT      SALMONELLA AND SHIGELLA, CULTURE         MICRO NUMBER:      [id number]    TEST STATUS:       FINAL    SPECIMEN SOURCE:   NOT GIVEN    SPECIMEN QUALITY:  INADEQUATE    RESULT:            Test not performed  No suitable specimen received  Test not performed  No suitable specimen received  (Q) GIARDIA AG, EIA, STOOL 10Apr2017 12:00AM Hattie Half     Test Name Result Flag Reference   OVA AND PARASITES WITH GIARDIA ANTIGEN      GIARDIA AG, EIA, STOOL         MICRO NUMBER:      [id number]    TEST STATUS:       FINAL    SPECIMEN SOURCE:   NOT GIVEN    SPECIMEN QUALITY:  INADEQUATE    RESULT 1:          Test not performed  No suitable specimen received  Test not performed  No suitable specimen received       (Q) OVA AND PARASITES, CONC AND PERM SMEAR 10Apr2017 12:00AM Tobie Peabody     Test Name Result Flag Reference   PARASITE EXAM, TRICHROME$STAIN      OVA AND PARASITES, CONC AND PERM SMEAR         MICRO NUMBER:      [id number]    TEST STATUS:       FINAL    SPECIMEN SOURCE:   NOT GIVEN    SPECIMEN QUALITY:  INADEQUATE    CONCENTRATION 1:   Test not performed  No suitable specimen received  Test not performed  No suitable specimen received  TRICHROME 1:       Test not performed  RESULT:            Test not performed  No suitable specimen received  Test not performed  No suitable specimen received  Test not performed  No suitable specimen received  Test not performed  No suitable specimen received       (1) URINALYSIS w URINE C/S REFLEX (will reflex a microscopy if leukocytes, occult blood, or nitrites are not within normal limits) 10Apr2017 12:00AM Tobie Peabody     Test Name Result Flag Reference   COLOR YELLOW  YELLOW   APPEARANCE CLEAR  CLEAR   SPECIFIC GRAVITY 1 031  1 001-1 035   PH 6 5  5 0-8 0   GLUCOSE 3+ A NEGATIVE   BILIRUBIN NEGATIVE  NEGATIVE   KETONES NEGATIVE  NEGATIVE   OCCULT BLOOD NEGATIVE  NEGATIVE   PROTEIN 1+ A NEGATIVE   NITRITE NEGATIVE  NEGATIVE   LEUKOCYTE ESTERASE NEGATIVE  NEGATIVE   WBC NONE SEEN /HPF  < OR = 5   RBC NONE SEEN /HPF  < OR = 2   SQUAMOUS EPITHELIAL CELLS NONE SEEN /HPF  < OR = 5   BACTERIA NONE SEEN /HPF  NONE SEEN   HYALINE CAST NONE SEEN /LPF  NONE SEEN   REFLEXIVE URINE CULTURE NO CULTURE INDICATED

## 2018-01-11 NOTE — PROGRESS NOTES
Plan    1  DSMT/MNT Time Record; Status:Complete;   Done: [id number] 03:03PM    Discussion/Summary    PATIENT EDUCATION RECORD   Indication for Services: type 2 Diabetes Mellitus  He is ready to learn  He has no barriers to learning  Healthy Eating:   Discussed general nutrition topics: Method: Instruction  Response: Verbalizes Understanding   Discussed importance of meal timing/consistency: Method: Instruction  Response: Verbalizes Understanding   Discussed nutrient types ( Cho/Fat/Protein): Method: Instruction and Handout  Response: Verbalizes Understanding   Discussed portion sizes: Method: Instruction and Handout  Response: Verbalizes Understanding   Discussed Eating Out: Method: Instruction  Response: Verbalizes Understanding   Discussed food label reading: Method: Instruction  Response: Verbalizes Understanding  Provided food diary and instructions on use: Method: Instruction and HandoutResponse: Verbalizes Understanding   Provided meal planning: Method: Instruction and Handout  Response: Verbalizes Understanding His current weight is 216 2 pounds  His keal needs are 1600 calories a day  His CHO's per meal are 45 grams per meal and 15 grams per between meal snack  He/She was provided a meal plan for: fixed carbohydrates and weight loss  Discussed weight management/weight loss: Method: Instruction  Response: Verbalizes Understanding His weight goal is Lose 10% of present body weight  Discussed fat intake and choices: Method: Instruction and Handout  Response: Verbalizes Understanding   Discussed basic carbohydrate counting: Method: Instruction and Handout  Response: Verbalizes Understanding   Being Active:   Stated the benefits of exercise: Method: Instruction  Response: Verbalizes Understanding   Discussed "exercise guidelines": Method: Instruction  Response: Verbalizes Understanding     Recommend he discuss a structured exercise program with his PCP/Cardiologist  He was given the following educational materials: portion book, Personal Meal Plan 1600 calories a day calories and Calorie and Carbohydrate Tracking Books/Websites/Phone Apps   Chief Complaint  Patient with T2DM with neuropathy seen for MNT per MD request       History of Present Illness  A1C in July 2017 was 8 1  Patient reports a weight gain of 15 pounds in the past 7 months  He reports having surgery on his foot in April and has only recently been cleared to resume exercise  Patient was walking 5 miles a day prior to foot surgery and feels the weight gain was caused by decreased activity  He is planning on working his way up to walking 5 miles a day again  Problems identified in food recall include inconsistent carbohydrate intake at meals, poorly timed meals, excess calories coming from snacks (i e  Rice Krispie Treats and gummy candy), low intake of plant based foods, whole grains and low fat dairy and general lack of balance  Provided patient with a 1600 calorie meal plan to assist with consistency, balance and portion control  Encouraged him to consume his meals 4-5 hours apart  Advised him to keep his carbohydrate intake to 45 grams per meal and 15 grams per between meal snack to assist with glycemic control  Suggested he limit lean protein intake to 6 ounces a day and keep added fat to 5 servings daily to assist with lipid management and calorie control  Patient would benefit from increasing his intake of plant based foods  Parvinlivankieran Welch agreed to keep daily food logs  He will attend Living Well with Diabetes class in November  RD will remain available for further dietary questions/concerns  This is his initial assessment    Present at session: patient    Medical Diagnosis/Reason for Referral:T2DM  He has no special learning needs  His caloric needs are ~1600 calories a day  Recent weight change: Reported 15 pound weight gain in past 7 months  Spouse  shops for food  Patient  cooks the food     Exercise routine:  Patient was just cleared to exercise  He walked 1 mile (~20 minutes) daily for the past week  His plan is to work his way back up to walking 5 miles a day  He eats breakfast at  6:00AM AM 1/2 of a bagel thin, dry, 1 Light-n-Fit yogurt, coffee with creamer and Splenda OR, out 1x a week: 1 large blueberry pancake with SF syrup    He eats lunch at  12:00PM PM [place] made with ham and cheese on 2 slices of wheat bread, 1 tsp mayonnaise, diet iced tea OR 1 can of broth based soup, 6 salt free saltine crackers, diet beverage OR 2x a week out: diner: 1 cup broth based soup,  salad with 1/4 cup Ranch dressing   He snacks at 3:00PM PM 1 piece of fruit or a Rice Krispie Treat   He eats dinner at  5:00-6:00PM PM 6 oz meat or chicken or fish, 1 cup potato or rice or noodle, 1/3 cup corn/peas/lima beans or 1 cup broccoli & cauliflower OR 1 5 cups pasta, 1/2 cup meat sauce, salad with 2 Tblsp Ranch dressing; 3x a week out: restaurant: scallops with butter, [employer] fries, salad or coleslaw OR steak and potato OR burger and fries; diet iced tea   He snacks at 7:00PM at bedtime Patient has a snack every evening: Rice Krispie Treat OR small packet of gummy candy OR 1 piece of fruit OR celery with 1/2 cup FF Ranch dressing     NUTRITION DIAGNOSES   Inconsistent Intake Carbs   Inconsistent carbohydrate intake related to: Food and nutrition related knowledge deficit concerning appropriate amount and timing of carbohydrate intake  As evidenced by: Estimated carbohydrate intake that is different from recommended types or ingested on an irregular basis   Medical Nutrition Therapy Intervention: Plate Method, Carbohydrate counting, Meal planning, Individualized meal plan, Strategies to increase the intake of plant based foods, Strategies to monitor portion control, Label reading, Meal timing, Exercise Guidelines, Behavior modification strategies and Weight/BMI Goals  His comprehension was good   His motivation was good      His compliance was good Minra Barreto Goals:  1  Consume meals 4-5 hours apart  2  45 grams CHO per meal and 15 grams per between meal snack  3  6 ounces lean protein and 5 added fats daily  4  Increase aerobic exercise to a minimum of 150 minutes a week           Vitals  Signs   Recorded: [id number] 03:04PM   Weight: 216 lb 2 oz  BMI Calculated: 31 01  BSA Calculated: 2 16    Results/Data  DSMT/MNT Time Record [id number] 03:03PM Faby Guido     Test Name Result Flag Reference   Date of Service 10/17/2017     Start - Stop Time 11:00-11:45AM     Total MInutes 45 minutes     Group Or Individual Instruction MNT-I       Future Appointments    Date/Time Provider Specialty Site   11/08/2017 01:00 PM Nini Rodriguez RD Diabetes Educator [address] ENDOCRINOLOGY   11/15/2017 01:00 PM Nini Rodriguez RD Diabetes Educator [address] ENDOCRINOLOGY   11/06/2017 01:00 PM Faby Guido RD, MARIEL Diabetes Educator [address] ENDOCRINOLOGY   11/13/2017 01:00 PM Faby Guido RD, MARIEL Diabetes Educator [facility] ENDOCRINOLOGY   12/28/2017 09:00 AM Corrinne Bonier, [facility] Endocrinology ST [address] [place] [address] ENDOCRINOLOGY     Signatures   Electronically signed by : China Santana, [facility]; Oct 18 2017  8:34AM EST                       (Author)    Electronically signed by : FELTON Mejia ; Oct 18 2017  9:31AM EST

## 2018-01-12 NOTE — RESULT NOTES
Discussion/Summary   Stool for C diff negative  Verified Results  CLOSTRIDIUM DIFFICILE CULT W/REFL TOXIN B,PCR [id number] 02:17PM Litzy Segovia   REPORT COMMENT:  FASTING:NO     Test Name Result Flag Reference   CLOSTRIDIUM DIFFICILE$CULTURE see note     C  DIFFICILE CULTURE  SOURCE : STOOL   RESULT/COMMENT:         No Clostridium difficile isolated       C DIFF TOX B QL PCR  C DIFF TOX B QL PCR             Not indicated

## 2018-01-13 VITALS — BODY MASS INDEX: 31.01 KG/M2 | WEIGHT: 216.13 LBS

## 2018-01-13 VITALS
BODY MASS INDEX: 30.78 KG/M2 | DIASTOLIC BLOOD PRESSURE: 70 MMHG | WEIGHT: 215 LBS | HEART RATE: 72 BPM | RESPIRATION RATE: 16 BRPM | SYSTOLIC BLOOD PRESSURE: 138 MMHG | HEIGHT: 70 IN | TEMPERATURE: 98 F

## 2018-01-14 VITALS
HEART RATE: 60 BPM | HEIGHT: 70 IN | WEIGHT: 211 LBS | DIASTOLIC BLOOD PRESSURE: 74 MMHG | TEMPERATURE: 98.1 F | BODY MASS INDEX: 30.21 KG/M2 | SYSTOLIC BLOOD PRESSURE: 138 MMHG | RESPIRATION RATE: 16 BRPM

## 2018-01-14 VITALS
DIASTOLIC BLOOD PRESSURE: 76 MMHG | RESPIRATION RATE: 18 BRPM | HEART RATE: 78 BPM | SYSTOLIC BLOOD PRESSURE: 150 MMHG | HEIGHT: 70 IN

## 2018-01-14 VITALS
BODY MASS INDEX: 30.14 KG/M2 | SYSTOLIC BLOOD PRESSURE: 150 MMHG | WEIGHT: 210.5 LBS | HEART RATE: 90 BPM | HEIGHT: 70 IN | DIASTOLIC BLOOD PRESSURE: 68 MMHG

## 2018-01-14 VITALS
TEMPERATURE: 98.7 F | DIASTOLIC BLOOD PRESSURE: 74 MMHG | HEIGHT: 69 IN | BODY MASS INDEX: 30.96 KG/M2 | WEIGHT: 209 LBS | HEART RATE: 64 BPM | SYSTOLIC BLOOD PRESSURE: 130 MMHG | RESPIRATION RATE: 16 BRPM

## 2018-01-14 VITALS
RESPIRATION RATE: 16 BRPM | HEIGHT: 70 IN | SYSTOLIC BLOOD PRESSURE: 126 MMHG | WEIGHT: 206 LBS | TEMPERATURE: 97.5 F | DIASTOLIC BLOOD PRESSURE: 70 MMHG | HEART RATE: 64 BPM | BODY MASS INDEX: 29.49 KG/M2

## 2018-01-14 VITALS
RESPIRATION RATE: 16 BRPM | WEIGHT: 201 LBS | BODY MASS INDEX: 28.77 KG/M2 | HEART RATE: 84 BPM | TEMPERATURE: 98.5 F | HEIGHT: 70 IN | DIASTOLIC BLOOD PRESSURE: 70 MMHG | SYSTOLIC BLOOD PRESSURE: 144 MMHG

## 2018-01-14 VITALS
DIASTOLIC BLOOD PRESSURE: 72 MMHG | HEART RATE: 84 BPM | BODY MASS INDEX: 31.35 KG/M2 | HEIGHT: 70 IN | WEIGHT: 219 LBS | SYSTOLIC BLOOD PRESSURE: 142 MMHG | TEMPERATURE: 98.7 F | RESPIRATION RATE: 16 BRPM

## 2018-01-14 VITALS
HEIGHT: 70 IN | RESPIRATION RATE: 16 BRPM | DIASTOLIC BLOOD PRESSURE: 80 MMHG | HEART RATE: 68 BPM | BODY MASS INDEX: 29.78 KG/M2 | WEIGHT: 208 LBS | SYSTOLIC BLOOD PRESSURE: 134 MMHG | TEMPERATURE: 97.9 F

## 2018-01-14 NOTE — RESULT NOTES
Discussion/Summary   Overall BS control, HgbA1C, remains elevated at 8 1  Other labs ok except Trig mildly elevated  Recommend patient increase Farxiga to 10mg QD and follow low fat and low sugar/carb diet more carefully  Verified Results  (1) CBC/PLT/DIFF [id number] 06:10PM [employer]     Test Name Result Flag Reference   WBC COUNT 11 29 Thousand/uL H 4 31-10 16   RBC COUNT 4 11 Million/uL  3 88-5 62   HEMOGLOBIN 13 1 g/dL  12 0-17 0   HEMATOCRIT 35 9 % L 36 5-49 3   MCV 87 fL  82-98   MCH 31 9 pg  26 8-34 3   MCHC 36 5 g/dL  31 4-37 4   RDW 14 0 %  11 6-15 1   MPV 10 1 fL  8 9-12 7   PLATELET COUNT 005 Thousands/uL  149-390   nRBC AUTOMATED 0 /100 WBCs     This is an appended report  These results have been appended to a previously preliminary verified report  This bloodwork is non-fasting  Please drink two glasses of water morning of  bloodwork  This is an appended report  These results have been appended to a previously verified report  (1) CBC/PLT/DIFF [id number] 06:10PM [employer]     Test Name Result Flag Reference   NEUTROPHILS - REL 67 %  43-75   LYMPHOCYTES - REL 21 %  14-44   MONOCYTES - REL 6 %  4-12   EOSINOPHILS - REL 4 %  0-6   BASOPHILS - REL 2 % H 0-1   NEUTROPHILS ABS 7 56 Thousand/uL  1 85-7 62   LYMPHOTCYTES ABS 2 37 Thousand/uL  0 60-4 47   MONOCYTES ABS 0 68 Thousand/uL  0 00-1 22   EOSINOPHILS ABS 0 45 Thousand/uL H 0 00-0 40   BASOPHILS ABS 0 23 Thousand/uL H 0 00-0 10   TOTAL COUNTED      RBC MORPHOLOGY Normal     PLT ESTIMATE Decreased A Adequate   This bloodwork is non-fasting  Please drink two glasses of water morning of  bloodwork  (1) HEMOGLOBIN A1C [id number] 06:10PM [employer]     Test Name Result Flag Reference   HEMOGLOBIN A1C 8 1 % H 4 2-6 3   EST  AVG   GLUCOSE 186 mg/dl       (1) COMPREHENSIVE METABOLIC PANEL [id number] 09:66GI [employer]  Order Number: [id number]     Test Name Result Flag Reference   GLUCOSE,RANDM 140 mg/dL    If the patient is fasting, the ADA then defines impaired fasting glucose as > 100 mg/dL and diabetes as > or equal to 123 mg/dL  SODIUM 137 mmol/L  136-145   POTASSIUM 4 4 mmol/L  3 5-5 3   CHLORIDE 102 mmol/L  100-108   CARBON DIOXIDE 26 mmol/L  21-32   ANION GAP (CALC) 9 mmol/L  4-13   BLOOD UREA NITROGEN 24 mg/dL  5-25   CREATININE 0 81 mg/dL  0 60-1 30   Standardized to IDMS reference method   CALCIUM 10 2 mg/dL H 8 3-10 1   BILI, TOTAL 0 61 mg/dL  0 20-1 00   ALK PHOSPHATAS 67 U/L     ALT (SGPT) 38 U/L  12-78   AST(SGOT) 23 U/L  5-45   ALBUMIN 3 8 g/dL  3 5-5 0   TOTAL PROTEIN 7 2 g/dL  6 4-8 2   eGFR Non-African American      >60 0 ml/min/1 73sq m   [facility] Disease Education Program recommendations are as follows:  GFR calculation is accurate only with a steady state creatinine  Chronic Kidney disease less than 60 ml/min/1 73 sq  meters  Kidney failure less than 15 ml/min/1 73 sq  meters  CORRECTED CALCIUM 10 4 mg/dL H 8 3-10 1     (1) LIPID PANEL, FASTING [id number] 09:54AM Yeison España Order Number: [id number]     Test Name Result Flag Reference   CHOLESTEROL 155 mg/dL     HDL,DIRECT 44 mg/dL  40-60   Specimen collection should occur prior to Metamizole administration due to the potential for falsely depressed results  LDL CHOLESTEROL CALCULATED 64 mg/dL  0-100   Triglyceride:         Normal              <150 mg/dl       Borderline High    150-199 mg/dl       High               200-499 mg/dl       Very High          >499 mg/dl  Cholesterol:         Desirable        <200 mg/dl      Borderline High  200-239 mg/dl      High             >239 mg/dl  HDL Cholesterol:        High    >59 mg/dL      Low     <41 mg/dL  LDL CALCULATED:    This screening LDL is a calculated result  It does not have the accuracy of the Direct Measured LDL in the monitoring of patients with hyperlipidemia and/or statin therapy     Direct Measure LDL (ONZ638) must be ordered separately in these patients  TRIGLYCERIDES 237 mg/dL H <=150   Specimen collection should occur prior to N-Acetylcysteine or Metamizole administration due to the potential for falsely depressed results  (1) TSH WITH FT4 REFLEX [id number] 09:54AM Jerri Amabile Order Number: [id number]     Test Name Result Flag Reference   TSH 1 890 uIU/mL  0 358-3 740   Patients undergoing fluorescein dye angiography may retain small amounts of fluorescein in the body for 48-72 hours post procedure  Samples containing fluorescein can produce falsely depressed TSH values  If the patient had this procedure,a specimen should be resubmitted post fluorescein clearance  (1) URIC ACID [id number] 09:54AM Jerri AmabilKeegy Order Number: [id number]     Test Name Result Flag Reference   URIC ACID 4 9 mg/dL  4 2-8 0   Specimen collection should occur prior to Metamizole administration due to the potential for falsely depressed results  (1) VITAMIN D 25-HYDROXY [id number] 09:54AM Jerri AmabilKeegy Order Number: [id number]     Test Name Result Flag Reference   VIT D 25-HYDROX 30 6 ng/mL  30 0-100 0   This assay is a certified procedure of the CDC Vitamin D Standardization Certification Program (VDSCP)     Deficiency <20ng/ml   Insufficiency 20-30ng/ml   Sufficient  ng/ml     *Patients undergoing fluorescein dye angiography may retain small amounts of fluorescein in the body for 48-72 hours post procedure  Samples containing fluorescein can produce falsely elevated Vitamin D values  If the patient had this procedure, a specimen should be resubmitted post fluorescein clearance       (1) PSA (SCREEN) (Dx V76 44 Screen for Prostate Cancer) [id number] 09:54AM Jerri AmabilKeegy Order Number: [id number]     Test Name Result Flag Reference   PROSTATE SPECIFIC ANTIGEN 0 4 ng/mL  0 0-4 0   American Urological Association Guidelines define biochemical recurrence of prostate cancer as a detectable or rising PSA value post-radical prostatectomy that is greater than or equal to 0 2 ng/mL with a second confirmatory level of greater than or equal to 0 2 ng/mL  Plan  DM type 2, not at goal    · (1) HEMOGLOBIN A1C ; every 3 months; Last [id number]; Next [id number]; Status:Active  Hyperlipidemia    · (1) LIPID PANEL, FASTING ; every 1 year;  Last [id number]; Next [id number]; Status:Active

## 2018-01-15 NOTE — RESULT NOTES
Discussion/Summary   Labs okay/stable  Continue present treatment  Verified Results  (1) CBC/PLT/DIFF [id number] 01:36PM An Youssef Order Number: [id number]     Test Name Result Flag Reference   WBC COUNT 11 24 Thousand/uL H 4 31-10 16   RBC COUNT 4 13 Million/uL  3 88-5 62   HEMOGLOBIN 12 7 g/dL  12 0-17 0   HEMATOCRIT 36 8 %  36 5-49 3   MCV 89 fL  82-98   MCH 30 8 pg  26 8-34 3   MCHC 34 5 g/dL  31 4-37 4   RDW 14 6 %  11 6-15 1   MPV 10 4 fL  8 9-12 7   PLATELET COUNT 268 Thousands/uL  149-390   nRBC AUTOMATED 0 /100 WBCs     NEUTROPHILS RELATIVE PERCENT 64 %  43-75   LYMPHOCYTES RELATIVE PERCENT 22 %  14-44   MONOCYTES RELATIVE PERCENT 7 %  4-12   EOSINOPHILS RELATIVE PERCENT 7 % H 0-6   BASOPHILS RELATIVE PERCENT 0 %  0-1   NEUTROPHILS ABSOLUTE COUNT 7 23 Thousands/? ??L  1 85-7 62   LYMPHOCYTES ABSOLUTE COUNT 2 43 Thousands/? ??L  0 60-4 47   MONOCYTES ABSOLUTE COUNT 0 75 Thousand/? ??L  0 17-1 22   EOSINOPHILS ABSOLUTE COUNT 0 75 Thousand/? ??L H 0 00-0 61   BASOPHILS ABSOLUTE COUNT 0 03 Thousands/? ??L  0 00-0 10     (1) COMPREHENSIVE METABOLIC PANEL [id number] 70:35YC An Youssef Order Number: [id number]     Test Name Result Flag Reference   GLUCOSE,RANDM 147 mg/dL H    If the patient is fasting, the ADA then defines impaired fasting glucose as > 100 mg/dL and diabetes as > or equal to 123 mg/dL  Specimen collection should occur prior to Sulfasalazine administration due to the potential for falsely depressed results  Specimen collection should occur prior to Sulfapyridine administration due to the potential for falsely elevated results     SODIUM 138 mmol/L  136-145   POTASSIUM 5 0 mmol/L  3 5-5 3   CHLORIDE 102 mmol/L  100-108   CARBON DIOXIDE 27 mmol/L  21-32   ANION GAP (CALC) 9 mmol/L  4-13   BLOOD UREA NITROGEN 17 mg/dL  5-25   CREATININE 0 75 mg/dL  0 60-1 30   Standardized to IDMS reference method   CALCIUM 9 9 mg/dL  8 3-10 1   BILI, TOTAL 0 59 mg/dL 0  20-1 00   ALK PHOSPHATAS 70 U/L     ALT (SGPT) 38 U/L  12-78   Specimen collection should occur prior to Sulfasalazine and/or Sulfapyridine administration due to the potential for falsely depressed results  AST(SGOT) 26 U/L  5-45   Specimen collection should occur prior to Sulfasalazine administration due to the potential for falsely depressed results  ALBUMIN 3 7 g/dL  3 5-5 0   TOTAL PROTEIN 7 0 g/dL  6 4-8 2   eGFR 98 ml/min/1 73sq m     National Kidney Disease Education Program recommendations are as follows:  GFR calculation is accurate only with a steady state creatinine  Chronic Kidney disease less than 60 ml/min/1 73 sq  meters  Kidney failure less than 15 ml/min/1 73 sq  meters

## 2018-01-15 NOTE — RESULT NOTES
Verified Results  (1) COMPREHENSIVE METABOLIC PANEL [id number] 24:02XM [employer] Lum[employer] Order Number: [id number]     Test Name Result Flag Reference   GLUCOSE,RANDM 100 mg/dL     If the patient is fasting, the ADA then defines impaired fasting glucose as > 100 mg/dL and diabetes as > or equal to 123 mg/dL  SODIUM 138 mmol/L  136-145   POTASSIUM 4 8 mmol/L  3 5-5 3   CHLORIDE 101 mmol/L  100-108   CARBON DIOXIDE 28 mmol/L  21-32   ANION GAP (CALC) 9 mmol/L  4-13   BLOOD UREA NITROGEN 30 mg/dL H 5-25   CREATININE 0 88 mg/dL  0 60-1 30   Standardized to IDMS reference method   CALCIUM 9 6 mg/dL  8 3-10 1   BILI, TOTAL 0 56 mg/dL  0 20-1 00   ALK PHOSPHATAS 58 U/L     ALT (SGPT) 35 U/L  12-78   AST(SGOT) 16 U/L  5-45   ALBUMIN 3 4 g/dL L 3 5-5 0   TOTAL PROTEIN 6 8 g/dL  6 4-8 2   eGFR Non-African American      >60 0 ml/min/1 73sq [facility] [employer] Disease Education Program recommendations are as follows:  GFR calculation is accurate only with a steady state creatinine  Chronic Kidney disease less than 60 ml/min/1 73 sq  meters  Kidney failure less than 15 ml/min/1 73 sq  meters  (1) HEMOGLOBIN A1C [id number] 08:34AM Cool Lumens Order Number: [id number]     Test Name Result Flag Reference   HEMOGLOBIN A1C 8 0 % H 4 2-6 3   EST  AVG  GLUCOSE 183 mg/dl       (1) LIPID PANEL, FASTING [id number] 08:34AM [employer] Lumens Order Number: [id number]     Test Name Result Flag Reference   CHOLESTEROL 151 mg/dL     HDL,DIRECT 52 mg/dL  40-60   Specimen collection should occur prior to Metamizole administration due to the potential for falsely depressed results  LDL CHOLESTEROL CALCULATED 63 mg/dL  0-100   - Patient Instructions:  This is a fasting blood test  Water,black tea or black  coffee only after 9:00pm the night before test   Drink 2 glasses of water the morning of test       Triglyceride:         Normal              <150 mg/dl       Borderline High 150-199 mg/dl       High               200-499 mg/dl       Very High          >499 mg/dl  Cholesterol:         Desirable        <200 mg/dl      Borderline High  200-239 mg/dl      High             >239 mg/dl  HDL Cholesterol:        High    >59 mg/dL      Low     <41 mg/dL  LDL CALCULATED:    This screening LDL is a calculated result  It does not have the accuracy of the Direct Measured LDL in the monitoring of patients with hyperlipidemia and/or statin therapy  Direct Measure LDL (RBH600) must be ordered separately in these patients  TRIGLYCERIDES 180 mg/dL H <=150   Specimen collection should occur prior to N-Acetylcysteine or Metamizole administration due to the potential for falsely depressed results  Plan  DM type 2, not at goal    · (1) HEMOGLOBIN A1C ; every 3 months; Last [id number]; Next 47XVB3948;  Status:Active  Hyperlipidemia    · (1) LIPID PANEL, FASTING ; every 1 year; Last 55ONM0639; Next 74YZF8832;  Status:Active    Discussion/Summary   Labs ok/improved overall  Cont present Tx and follow low sugar/carb diet more carefully

## 2018-01-15 NOTE — PROGRESS NOTES
Plan    1  DSMT/MNT Time Record; Status:Complete;   Done: [id number] 05:39PM    Discussion/Summary    PATIENT EDUCATION RECORD   Living Well with Diabetes Class 4 Education Content: Types of cholesterol, Dietary sources of cholesterol, Types of fat, Types of fiber, Reading food labels- in depth, Healthy choices when dining out        History of Present Illness  Patient attended Living Well with Diabetes class 4        Results/Data  DSMT/MNT Time Record [id number] 05:39PM Treasa Epley     Test Name Result Flag Reference   Date of Service 11/13/2017     Start - Stop Time 1:00-3:00PM     Total MInutes 120 minutes     Group Or Individual Instruction DSMT-G4       Future Appointments    Date/Time Provider Specialty Site   12/28/2017 09:00 AM Nasrin Hernandez, [facility] Endocrinology [facility] ENDOCRINOLOGY     Signatures   Electronically signed by : Phani Raymundo [facility]; Nov 13 2017  5:41PM EST                       (Author)    Electronically signed by : FELTON Wayne ; Nov 14 2017  7:46AM EST

## 2018-01-17 NOTE — PROGRESS NOTES
Plan    1  DSMT/MNT Time Record; Status:Complete;   Done: [id number] 12:00AM    Discussion/Summary    PATIENT EDUCATION RECORD   Indication for Services: type 2 Diabetes Mellitus  He is ready to learn  He has aural barriers to learning  Diabetes Disease Process:   He understands the pathophysiology of diabetes: Method: Instruction  Response: Verbalizes Understanding   Discussed patient's type of diabetes: Method: Instruction  Response: Verbalizes Understanding   Discussed diagnosis criteria: Method: Instruction  Response: Verbalizes Understanding   Discussed treatment goals: Method: Instruction  Response: Verbalizes Understanding   Discussed benefits of control: Method: Instruction  Response: Verbalizes Understanding   Discussed treatment options: Method: Instruction  Response: Verbalizes Understanding   Healthy Eating:   Discussed general nutrition topics: Method: Instruction  Response: Verbalizes Understanding   Being Active:   Stated the benefits of exercise: Method: Instruction  Response: Verbalizes Understanding   Discussed "exercise guidelines": Method: Instruction  Response: Verbalizes Understanding  Recommend he discuss a structured exercise program with his PCP/Cardiologist    His blood glucose targets are: Pre-meal target , Post-meal target < 140 and HS target 100-140  Monitoring:   Discussed target blood glucose ranges: Method: Instruction and Handout  Response: Verbalizes Understanding  Discussed target hemoglobin A1c: Method: Instruction  Response: Verbalizes Understanding  Discussed Finger stick testing: Method: Instruction and Demonstration  Response: Verbalizes Understanding and Returns Demonstration  Discussed proper stick techniques: Method: Instruction and Demonstration  Response: Verbalizes Understanding and Returns Demonstration  Discussed testing times: Method: Instruction  Response: Verbalizes Understanding  Discussed safe lancet disposal: Method: Instruction   Response: Verbalizes Understanding  Discussed meter : Method: Instruction  Response: Verbalizes Understanding  Discussed options for record keeping: Method: Instruction  Response: Verbalizes Understanding  Discussed reporting of readings to M D : Method: Instruction  Response: Verbalizes Understanding  He was given a Accu-Chek Guide meter  He was instructed how to use the meter  Taking Medication:   Discussed Oral Medication[de-identified] Method: Instruction  Response: [facility] [employer]  Stated name,dose, and timing of oral meds  Method: Instruction  Response: [employer]  Stated side effects/precautions with diabetes meds  Method: Instruction  Response: [employer]  Discussed medication safety  Method: Instruction  Response: [facility] [employer]  He is taking  biguanides, DP-4 Inhibitors, sulfonylureas and combination agent  He was given Fast 15 List   Problem Solving: He is on medications that cause hypoglycemia, He is able to state the symptoms, prevention, and treatment of hypoglycemia   Hypoglycemia: Stated definition and causes: Method: Instruction  Response: Verbalizes Understanding   Described signs and symptoms: Method: Instruction and Handout  Response: Verbalizes Understanding   Discussed prevention: Method: Instruction  Response: Verbalizes Instruction   Discussed treatment: Method: Instruction  Response: [employer] when to notify physician and when to call EMS: Method: Instruction  Response: Verbalizes Instruction   Hyperglycemia: Stated definition and causes: Method: Instruction  Response: Verbalizes Understanding   Described signs and symptoms: Method: Instruction and Handout  Response: Verbalizes Instruction   Reducing Risk:   Discussed long term complications- prevention, assessment, and monitoring  Method: Instruction  Response: [employer]  Discussed yearly recommended exams  Method: Instruction   Response: [facility] Instruction  Chief Complaint  Patient with T2DM seen for class assessment per MD request       History of Present Illness  Brennan's A1c has been around 8 1 for the past year  Patient reports having two toes amputated since April  He has not been walking secondary to the surgeries  He is scheduled to meet with his MD on Friday and is hoping he will be released to resume exercise  Patient was walking 3-5 miles a day prior to the surgeries  He lost ~ 60 pounds 7 years ago and has been able to maintain the weight loss (270 down to 210 pounds)  Patient also reports smoking cessation ~7 years ago  Patient was given a new Accu-Chek guide meter and show how to use it  His fingerstick BG at visit was 98  He is planning on taking Living Well with Diabetes classes in November  He is scheduled for MNT in one week  Educator will remain available for further questions/concerns        Results/Data  DSMT/MNT Time Record [id number] 12:00AM Kayley Lancaster     Test Name Result Flag Reference   Date of Service 10/9/2017     Start - Stop Time 10:50-11:45PM     Total MInutes 55 minutes     Group Or Individual Instruction DSMT-I       Future Appointments    Date/Time Provider Specialty Site   10/17/2017 11:00 AM Kayley Lancaster RD, LDN Diabetes Educator [facility] ENDOCRINOLOGY   [facility]2017 09:[facility]     Signatures   Electronically signed by : Aruna Rios, [facility]; Oct 10 2017  1:42PM EST                       (Author)    Electronically signed by : FELTON Patel ; Oct 11 2017 12:16PM EST

## 2018-01-18 NOTE — RESULT NOTES
Discussion/Summary   No evidence of Cushing's       Verified Results  (1) CORTISOL AM SPECIMEN [id number] 08:06AM Cory Roberts   REPORT COMMENT:  FASTING:YES     Test Name Result Flag Reference   CORTISOL, A M  0 6 mcg/dL L    Reference Range  8 a m  (7-9 a m ) Specimen: 4 0-22 0

## 2018-01-18 NOTE — PROGRESS NOTES
Plan    1  DSMT/MNT Time Record; Status:Complete;   Done: [id number] 02:39PM    Discussion/Summary    PATIENT EDUCATION RECORD   Indication for Services: type 2 Diabetes Mellitus  He is ready to learn  He has no barriers to learning  Monitoring: He is currently using a AccuChek Sandra meter  It's very old, assured him we will provide a new one at his class assessment appointment  Taking Medication:   Discussed devices-syringe/pen  Method: Instruction, Handout and Demonstration  Response: Verbalizes Instruction and Returns Demonstration   He uses Xultophy  Placebo injection  Method: Instruction and Demonstration  Response: Returns Demonstration   He demonstrated syringe/pen administration  Discussed site selection and rotation of injections  Method: Instruction and Handout  Response: [employer]  Discussed safe needle disposal  Method: Instruction and Demonstration  Response: [facility]  Discussed medication storage  Method: Instruction  Response: [employer]  He was given Hypoglycemia Tear Sheet   Problem Solving: He is on medications that cause hypoglycemia, He is able to state the symptoms, prevention, and treatment of hypoglycemia   Hypoglycemia: Stated definition and causes: Method: Instruction and Handout  Response: Verbalizes Understanding and Needs Review   Described signs and symptoms: Method: Instruction and Handout  Response: Verbalizes Understanding and Needs Review   Discussed prevention: Method: Instruction and Handout  Response: Verbalizes Instruction and Needs Review   Discussed treatment: Method: Instruction and Handout  Response: Verbalizes Instruction and Needs Review Recommended patient see: RD   He needs a pre-assessment  He was given the following educational materials: Hyperglycemia/Hypoglycemia, Injection Site Selection and Xultophy patient booklet   Chief Complaint  Sophie Scanlon was seen for [facility] teaching for type 2 diabetes        History of Present Illness  Taught Pamela Younger to self-inject Xultophy  He will use it once daily in the morning  Used demo teaching pen and his own pen to dial up a dose  Had him inject demo pen into demo "skin" prop  Then had him apply needle to his own pen and go through the motions of self-injection with no dose dialed up  He felt no pain with skin puncture  He will start using it tomorrow morning  Provided teaching about risk of hypoglycemia and nausea with this medication  Explained about content of LWD classes and encouraged him to sign up for a class assessment before leaving today        Future Appointments    Date/Time Provider Specialty Site   10/09/2017 11:00 AM Stevie Cee RD, LDN Diabetes Educator [facility] ENDOCRINOLOGY   12/28/2017 09:00 AM Raghav Templeton, [facility] Endocrinology [address] [facility] ENDOCRINOLOGY     Signatures   Electronically signed by : Brent Pizarro RD; Sep 27 2017  2:41PM EST                       (Author)    Electronically signed by : FELTON oY ; Sep 29 2017  7:41AM EST

## 2018-01-22 VITALS
SYSTOLIC BLOOD PRESSURE: 124 MMHG | WEIGHT: 210 LBS | HEIGHT: 70 IN | BODY MASS INDEX: 30.06 KG/M2 | DIASTOLIC BLOOD PRESSURE: 72 MMHG | TEMPERATURE: 97.6 F

## 2018-01-22 VITALS — HEART RATE: 76 BPM | RESPIRATION RATE: 16 BRPM

## 2018-01-23 VITALS
HEART RATE: 84 BPM | BODY MASS INDEX: 31.36 KG/M2 | WEIGHT: 219.03 LBS | HEIGHT: 70 IN | SYSTOLIC BLOOD PRESSURE: 162 MMHG | DIASTOLIC BLOOD PRESSURE: 94 MMHG

## 2018-01-23 NOTE — PROGRESS NOTES
Plan    1  DSMT/MNT Time Record; Status:Complete;   Done: [id number] 12:00AM    Discussion/Summary    PATIENT EDUCATION RECORD   Indication for Services: type 2 Diabetes Mellitus     Living Well with Diabetes Class 2 Education Content: Macronutrients, Carbohydrate sources, What one serving of carbohydrate equals, Effects of diet on blood glucose levels, effects of carbohydrates on blood glucose levels, Basics of meal planning: balance, portions, and meal times, Measurements, Reading food labels to determine carbohydrates       History of Present Illness  Pt attended Living Well Class #2      Future Appointments    Date/Time Provider Specialty Site   12/28/2017 09:00 AM Cyrus Fitzpatrick, [address] [place] [address] End[address] [facility] ENDOCRINOLOGY   12/11/2017 06:00 PM Marco Higgins RD, CDE Diabetes Educator [facility] ENDOCRINOLOGY     Signatures   Electronically signed by : Alejandro Hong, [facility]; Dec  7 2017 10:23AM EST                       (Author)    Electronically signed by : FELTON Negro ; Dec  7 2017 12:16PM EST

## 2018-01-23 NOTE — MISCELLANEOUS
Message   Recorded as Task   Date: 12/06/2017 11:06 AM, Created By: Lidya Bhatti   Task Name: Follow Up   Assigned To: Ruiz Samuels   Regarding Patient: Alayna Brar, Status: Active   CommentPepper Colon - 06 Dec 2017 11:06 AM     TASK CREATED  General Medical Question; [phone]  He was prescribed Cyclosporin 100 by the dermatologist for his eczema  There is a drug interaction with Simvastatin  He does not want to start the medication until you say it is ok  Ruiz Samuels - 06 Dec 2017 11:58 AM     TASK EDITED  Phone call to patient and discussed contraindication of simvastatin and cyclosporine with potential for myalgias, Rhabdomyolysis and increased cyclosporine and increased simvastatin levels  Patient states that Dr Juan Pollock, dermatologist is aware of the interaction and therefore started patient on a lower dose of cyclosporine and ordered labs in 1 week  Discussed treatment options with patient and recommend patient either decreased simvastatin to 20 mg daily or hold simvastatin if cyclosporin treatment will be temporary          Signatures   Electronically signed by : Miguel Barton DO; Dec  6 2017 12:01PM EST                       (Author)

## 2018-01-23 NOTE — PROGRESS NOTES
Plan    1  DSMT/MNT Time Record; Status:Complete;   Done: [id number] 12:00AM    Discussion/Summary    PATIENT EDUCATION RECORD   Indication for Services: type 2 Diabetes Mellitus  He is ready to learn     Living Well with Diabetes Class 3 Education Content:Oral/Injectable medication, Prevention, Short-term complications, Long-term complications, Foot care, Sick day management (illness and stress), Travel       Chief Complaint  Type 2 Diabetes      History of Present Illness  Pt attended Living Well class #3      Future Appointments    Date/Time Provider Specialty Site   12/28/2017 09:00 AM Rashida [facility], [address] ENDOCRINOLOGY     Signatures   Electronically signed by : Yury Bynum, [address] [place] [address]; Dec 12 2017  8:23AM EST                       (Author)    Electronically signed by : FELTON Evangelista ; Dec 19 2017  2:28PM EST

## 2018-03-05 DIAGNOSIS — M10.9 GOUT, UNSPECIFIED CAUSE, UNSPECIFIED CHRONICITY, UNSPECIFIED SITE: ICD-10-CM

## 2018-03-05 DIAGNOSIS — I10 ESSENTIAL HYPERTENSION: Primary | ICD-10-CM

## 2018-03-05 RX ORDER — METOPROLOL TARTRATE 100 MG/1
100 TABLET ORAL 2 TIMES DAILY
Qty: 180 TABLET | Refills: 2 | Status: SHIPPED | OUTPATIENT
Start: 2018-03-05 | End: 2018-03-14 | Stop reason: SDUPTHER

## 2018-03-05 RX ORDER — ALLOPURINOL 300 MG/1
300 TABLET ORAL EVERY MORNING
Qty: 90 TABLET | Refills: 2 | Status: SHIPPED | OUTPATIENT
Start: 2018-03-05 | End: 2018-03-13 | Stop reason: SDUPTHER

## 2018-03-06 DIAGNOSIS — E11.40 TYPE 2 DIABETES MELLITUS WITH DIABETIC NEUROPATHY, UNSPECIFIED LONG TERM INSULIN USE STATUS: Primary | ICD-10-CM

## 2018-03-06 DIAGNOSIS — I73.9 PERIPHERAL VASCULAR DISEASE (HCC): ICD-10-CM

## 2018-03-08 ENCOUNTER — HOSPITAL ENCOUNTER (OUTPATIENT)
Dept: NON INVASIVE DIAGNOSTICS | Facility: CLINIC | Age: 63
Discharge: HOME | End: 2018-03-08
Payer: COMMERCIAL

## 2018-03-08 DIAGNOSIS — I73.9 PERIPHERAL VASCULAR DISEASE (HCC): ICD-10-CM

## 2018-03-08 PROCEDURE — 93922 UPR/L XTREMITY ART 2 LEVELS: CPT | Performed by: SURGERY

## 2018-03-08 PROCEDURE — 93925 LOWER EXTREMITY STUDY: CPT

## 2018-03-08 PROCEDURE — 93925 LOWER EXTREMITY STUDY: CPT | Performed by: SURGERY

## 2018-03-08 PROCEDURE — 93923 UPR/LXTR ART STDY 3+ LVLS: CPT

## 2018-03-12 LAB
LEFT EYE DIABETIC RETINOPATHY: NORMAL
RIGHT EYE DIABETIC RETINOPATHY: NORMAL

## 2018-03-13 DIAGNOSIS — M10.9 GOUT, UNSPECIFIED CAUSE, UNSPECIFIED CHRONICITY, UNSPECIFIED SITE: ICD-10-CM

## 2018-03-13 RX ORDER — ALLOPURINOL 300 MG/1
TABLET ORAL
Qty: 90 TABLET | Refills: 1 | Status: SHIPPED | OUTPATIENT
Start: 2018-03-13 | End: 2019-02-22

## 2018-03-14 DIAGNOSIS — I10 ESSENTIAL HYPERTENSION: Primary | ICD-10-CM

## 2018-03-14 RX ORDER — LOSARTAN POTASSIUM AND HYDROCHLOROTHIAZIDE 25; 100 MG/1; MG/1
TABLET ORAL
Qty: 90 TABLET | Refills: 0 | Status: SHIPPED | OUTPATIENT
Start: 2018-03-14 | End: 2018-05-23 | Stop reason: SDUPTHER

## 2018-03-14 RX ORDER — METOPROLOL TARTRATE 100 MG/1
TABLET ORAL
Qty: 90 TABLET | Refills: 0 | Status: SHIPPED | OUTPATIENT
Start: 2018-03-14 | End: 2018-10-22 | Stop reason: SDUPTHER

## 2018-03-15 ENCOUNTER — LAB (OUTPATIENT)
Dept: LAB | Facility: CLINIC | Age: 63
End: 2018-03-15
Payer: COMMERCIAL

## 2018-03-15 ENCOUNTER — TRANSCRIBE ORDERS (OUTPATIENT)
Dept: LAB | Facility: CLINIC | Age: 63
End: 2018-03-15

## 2018-03-15 DIAGNOSIS — I10 ESSENTIAL (PRIMARY) HYPERTENSION: ICD-10-CM

## 2018-03-15 DIAGNOSIS — E11.40 TYPE 2 DIABETES MELLITUS WITH DIABETIC NEUROPATHY (HCC): ICD-10-CM

## 2018-03-15 DIAGNOSIS — E78.5 HYPERLIPIDEMIA: ICD-10-CM

## 2018-03-15 LAB
ALBUMIN SERPL BCP-MCNC: 3.2 G/DL (ref 3.5–5)
ALP SERPL-CCNC: 66 U/L (ref 46–116)
ALT SERPL W P-5'-P-CCNC: 37 U/L (ref 12–78)
ANION GAP SERPL CALCULATED.3IONS-SCNC: 6 MMOL/L (ref 4–13)
AST SERPL W P-5'-P-CCNC: 31 U/L (ref 5–45)
BILIRUB SERPL-MCNC: 0.71 MG/DL (ref 0.2–1)
BUN SERPL-MCNC: 19 MG/DL (ref 5–25)
CALCIUM SERPL-MCNC: 9.3 MG/DL (ref 8.3–10.1)
CHLORIDE SERPL-SCNC: 102 MMOL/L (ref 100–108)
CHOLEST SERPL-MCNC: 130 MG/DL (ref 50–200)
CO2 SERPL-SCNC: 28 MMOL/L (ref 21–32)
CREAT SERPL-MCNC: 0.94 MG/DL (ref 0.6–1.3)
CREAT UR-MCNC: 145 MG/DL
EST. AVERAGE GLUCOSE BLD GHB EST-MCNC: 151 MG/DL
GFR SERPL CREATININE-BSD FRML MDRD: 85 ML/MIN/1.73SQ M
GLUCOSE P FAST SERPL-MCNC: 147 MG/DL (ref 65–99)
HBA1C MFR BLD: 6.9 % (ref 4.2–6.3)
HDLC SERPL-MCNC: 37 MG/DL (ref 40–60)
LDLC SERPL CALC-MCNC: 54 MG/DL (ref 0–100)
MICROALBUMIN UR-MCNC: 5130 MG/L (ref 0–20)
MICROALBUMIN/CREAT 24H UR: 3538 MG/G CREATININE (ref 0–30)
POTASSIUM SERPL-SCNC: 4.2 MMOL/L (ref 3.5–5.3)
PROT SERPL-MCNC: 7.1 G/DL (ref 6.4–8.2)
SODIUM SERPL-SCNC: 136 MMOL/L (ref 136–145)
TRIGL SERPL-MCNC: 197 MG/DL

## 2018-03-15 PROCEDURE — 82043 UR ALBUMIN QUANTITATIVE: CPT

## 2018-03-15 PROCEDURE — 83036 HEMOGLOBIN GLYCOSYLATED A1C: CPT

## 2018-03-15 PROCEDURE — 80061 LIPID PANEL: CPT

## 2018-03-15 PROCEDURE — 36415 COLL VENOUS BLD VENIPUNCTURE: CPT

## 2018-03-15 PROCEDURE — 80053 COMPREHEN METABOLIC PANEL: CPT

## 2018-03-15 PROCEDURE — 82570 ASSAY OF URINE CREATININE: CPT

## 2018-03-16 NOTE — PROGRESS NOTES
Please call the patient regarding his abnormal result  Hemoglobin A1c has improved from 8 1 to 6 9  Triglycerides remain elevated at 197 but are improved  Fasting glucose is elevated on comprehensive metabolic panel  His urine microalbumin and creatinine ratio is significantly elevated  Is he seeing Nephrology? He has a follow-up scheduled with Dr Patricio Goldsmith on April 4th 2018

## 2018-03-19 ENCOUNTER — TELEPHONE (OUTPATIENT)
Dept: ENDOCRINOLOGY | Facility: CLINIC | Age: 63
End: 2018-03-19

## 2018-03-19 NOTE — TELEPHONE ENCOUNTER
Patient informed of lab results and will discuss this further with Dr Nirali Ramirez at office visit

## 2018-03-19 NOTE — TELEPHONE ENCOUNTER
----- Message from Gemma Negron sent at 3/16/2018  8:13 AM EDT -----  Please call the patient regarding his abnormal result  Hemoglobin A1c has improved from 8 1 to 6 9  Triglycerides remain elevated at 197 but are improved  Fasting glucose is elevated on comprehensive metabolic panel  His urine microalbumin and creatinine ratio is significantly elevated  Is he seeing Nephrology? He has a follow-up scheduled with Dr Juvenal Machado on April 4th 2018

## 2018-03-30 ENCOUNTER — TELEPHONE (OUTPATIENT)
Dept: FAMILY MEDICINE CLINIC | Facility: CLINIC | Age: 63
End: 2018-03-30

## 2018-03-30 NOTE — TELEPHONE ENCOUNTER
According to our records patient should be taking metoprolol 100 mg daily unless another physician had changed the dose  Please notify patient

## 2018-03-30 NOTE — TELEPHONE ENCOUNTER
Lloyd Orozco called [facility] Metoporol prescription  On March 5th a script was sent for 100 mg two times a day, then on March 14th a new script was sent for 100 mg one time daily  They would clarification on how this prescription should be taken?

## 2018-04-04 ENCOUNTER — OFFICE VISIT (OUTPATIENT)
Dept: ENDOCRINOLOGY | Facility: CLINIC | Age: 63
End: 2018-04-04
Payer: COMMERCIAL

## 2018-04-04 ENCOUNTER — TELEPHONE (OUTPATIENT)
Dept: FAMILY MEDICINE CLINIC | Facility: CLINIC | Age: 63
End: 2018-04-04

## 2018-04-04 ENCOUNTER — TELEPHONE (OUTPATIENT)
Dept: ENDOCRINOLOGY | Facility: CLINIC | Age: 63
End: 2018-04-04

## 2018-04-04 VITALS
SYSTOLIC BLOOD PRESSURE: 148 MMHG | HEART RATE: 82 BPM | HEIGHT: 69 IN | WEIGHT: 223 LBS | DIASTOLIC BLOOD PRESSURE: 88 MMHG | BODY MASS INDEX: 33.03 KG/M2

## 2018-04-04 DIAGNOSIS — I10 ESSENTIAL (PRIMARY) HYPERTENSION: ICD-10-CM

## 2018-04-04 DIAGNOSIS — E11.40 TYPE 2 DIABETES MELLITUS WITH DIABETIC NEUROPATHY, UNSPECIFIED LONG TERM INSULIN USE STATUS: Primary | ICD-10-CM

## 2018-04-04 DIAGNOSIS — E78.2 MIXED HYPERLIPIDEMIA: ICD-10-CM

## 2018-04-04 DIAGNOSIS — R80.1 PERSISTENT PROTEINURIA: ICD-10-CM

## 2018-04-04 DIAGNOSIS — E78.5 HYPERLIPIDEMIA, UNSPECIFIED HYPERLIPIDEMIA TYPE: Primary | ICD-10-CM

## 2018-04-04 PROBLEM — R23.8 EASY BRUISING: Status: ACTIVE | Noted: 2017-09-27

## 2018-04-04 PROBLEM — R23.3 EASY BRUISING: Status: ACTIVE | Noted: 2017-09-27

## 2018-04-04 PROBLEM — R42 VERTIGO: Status: ACTIVE | Noted: 2017-08-16

## 2018-04-04 PROBLEM — L97.509 DIABETIC FOOT ULCER (HCC): Status: ACTIVE | Noted: 2017-03-18

## 2018-04-04 PROBLEM — I73.9 PERIPHERAL ARTERIAL DISEASE (HCC): Status: ACTIVE | Noted: 2017-09-06

## 2018-04-04 PROBLEM — E11.621 DIABETIC FOOT ULCER (HCC): Status: ACTIVE | Noted: 2017-03-18

## 2018-04-04 PROCEDURE — 3066F NEPHROPATHY DOC TX: CPT | Performed by: INTERNAL MEDICINE

## 2018-04-04 PROCEDURE — 99214 OFFICE O/P EST MOD 30 MIN: CPT | Performed by: INTERNAL MEDICINE

## 2018-04-04 RX ORDER — SIMVASTATIN 40 MG
40 TABLET ORAL EVERY MORNING
Qty: 90 TABLET | Refills: 3 | Status: SHIPPED | OUTPATIENT
Start: 2018-04-04 | End: 2018-04-13 | Stop reason: SDUPTHER

## 2018-04-04 RX ORDER — AMLODIPINE BESYLATE 10 MG/1
10 TABLET ORAL DAILY
Qty: 90 TABLET | Refills: 3 | Status: SHIPPED | OUTPATIENT
Start: 2018-04-04 | End: 2018-12-05 | Stop reason: SDUPTHER

## 2018-04-04 RX ORDER — OMEGA-3-ACID ETHYL ESTERS 1 G/1
2 CAPSULE, LIQUID FILLED ORAL 2 TIMES DAILY
Qty: 360 CAPSULE | Refills: 3 | Status: SHIPPED | OUTPATIENT
Start: 2018-04-04 | End: 2018-11-21

## 2018-04-04 NOTE — TELEPHONE ENCOUNTER
Call patient and would recommend switching to Rudi Schirmer although recommend he discuss this with Dr Jessi Lea, endocrinologist 1st

## 2018-04-04 NOTE — TELEPHONE ENCOUNTER
Spoke to pts wife, Veto Riggs   She states that she will let [facility] know and they will speak to his endocrinologist

## 2018-04-04 NOTE — PATIENT INSTRUCTIONS
10% - bad control"> 10% - bad control,Hemoglobin A1c (HbA1c) greater than 10% indicating poor diabetic control,Haemoglobin A1c greater than 10% indicating poor diabetic control">   Diabetes Mellitus Type 2 in Adults, Ambulatory Care   GENERAL INFORMATION:   Diabetes mellitus type 2  is a disease that affects how your body uses glucose (sugar)  Insulin helps move sugar out of the blood so it can be used for energy  Normally, when the blood sugar level increases, the pancreas makes more insulin  Type 2 diabetes develops because either the body cannot make enough insulin, or it cannot use the insulin correctly  After many years, your pancreas may stop making insulin  Common symptoms include the following:   · More hunger or thirst than usual     · Frequent urination     · Weight loss without trying     · Blurred vision  Seek immediate care for the following symptoms:   · Severe abdominal pain, or pain that spreads to your back  You may also be vomiting  · Trouble staying awake or focusing    · Shaking or sweating    · Blurred or double vision    · Breath has a fruity, sweet smell    · Breathing is deep and labored, or rapid and shallow    · Heartbeat is fast and weak  Treatment for diabetes mellitus type 2  includes keeping your blood sugar at a normal level  You must eat the right foods, and exercise regularly  You may also need medicine if you cannot control your blood sugar level with nutrition and exercise  Manage diabetes mellitus type 2:   · Check your blood sugar level  You will be taught how to check a small drop of blood in a glucose monitor  Ask your healthcare provider when and how often to check during the day  Ask your healthcare provider what your blood sugar levels should be when you check them  · Keep track of carbohydrates (sugar and starchy foods)  Your blood sugar level can get too high if you eat too many carbohydrates   Your dietitian will help you plan meals and snacks that have the right amount of carbohydrates  · Eat low-fat foods  Some examples are skinless chicken and low-fat milk  · Eat less sodium (salt)  Some examples of high-sodium foods to limit are soy sauce, potato chips, and soup  Do not add salt to food you cook  Limit your use of table salt  · Eat high-fiber foods  Foods that are a good source of fiber include vegetables, whole grain bread, and beans  · Limit alcohol  Alcohol affects your blood sugar level and can make it harder to manage your diabetes  Women should limit alcohol to 1 drink a day  Men should limit alcohol to 2 drinks a day  A drink of alcohol is 12 ounces of beer, 5 ounces of wine, or 1½ ounces of liquor  · Get regular exercise  Exercise can help keep your blood sugar level steady, decrease your risk of heart disease, and help you lose weight  Exercise for at least 30 minutes, 5 days a week  Include muscle strengthening activities 2 days each week  Work with your healthcare provider to create an exercise plan  · Check your feet each day  for injuries or open sores  Ask your healthcare provider for activities you can do if you have an open sore  · Quit smoking  If you smoke, it is never too late to quit  Smoking can worsen the problems that may occur with diabetes  Ask your healthcare provider for information about how to stop smoking if you are having trouble quitting  · Ask about your weight:  Ask healthcare providers if you need to lose weight, and how much to lose  Ask them to help you with a weight loss program  Even a 10 to 15 pound weight loss can help you manage your blood sugar level  · Carry medical alert identification  Wear medical alert jewelry or carry a card that says you have diabetes  Ask your healthcare provider where to get these items  · Ask about vaccines  Diabetes puts you at risk of serious illness if you get the flu, pneumonia, or hepatitis   Ask your healthcare provider if you should get a flu, pneumonia, or hepatitis B vaccine, and when to get the vaccine  Follow up with your healthcare provider as directed:  Write down your questions so you remember to ask them during your visits  CARE AGREEMENT:   You have the right to help plan your care  Learn about your health condition and how it may be treated  Discuss treatment options with your caregivers to decide what care you want to receive  You always have the right to refuse treatment  The above information is an  only  It is not intended as medical advice for individual conditions or treatments  Talk to your doctor, nurse or pharmacist before following any medical regimen to see if it is safe and effective for you  © 2014 [address] is for End User's use only and may not be sold, redistributed or otherwise used for commercial purposes  All illustrations and images included in CareNotes® are the copyrighted property of A D A M , Inc  or Buster Giraldo

## 2018-04-04 NOTE — TELEPHONE ENCOUNTER
[facility] called and advised that the prescription you sent for the Amlodipine has a contradiction with Simvastatin that the patient is taking that was prescribed by another doctor   Pharmacist wanted to know if you are ok with that and if they should go ahead and fill the prescription

## 2018-04-04 NOTE — TELEPHONE ENCOUNTER
Pts wife, Sourav Michael called stating that pts Onglyza is no longer covered by insurance  Formulary alternatives are Januvia and Tradjenta  Is he able to switch to one of these? Please advise  Thank you

## 2018-04-04 NOTE — PROGRESS NOTES
Sandra Liz 57 y o  male MRN: [id number]    Encounter: [id number]      Assessment/Plan     Assessment: This is a 57y o -year-old male with diabetes with hyperglycemia, hypertension and hyperlipidemia  He has developed proteinuria  Plan:  1  Type 2 diabetes has improved with changes in his medications  2   Hypertension-the blood pressure remains elevated and in view of significant proteinuria, I have added amlodipine 10 mg per day  3   Hyperlipidemia-continue current regimen  4   Proteinuria-I have referred him to nephrology  CC: Diabetes    History of Present Illness     HPI:  58-year-old male with type 2 diabetes who is on multiple medications presents for follow-up  His diabetes has improved with changes in his medications  He denies any hypoglycemic episodes  For the hypertension, he is on multiple medications  He denies any headaches  For the hyperlipidemia, he is on a statin  He denies any myalgia  Review of Systems   Constitutional: Negative for chills and fever  Respiratory: Negative for shortness of breath  Cardiovascular: Negative for chest pain and leg swelling  Gastrointestinal: Negative for constipation, diarrhea, nausea and vomiting  Endocrine: Negative for polydipsia and polyuria  All other systems reviewed and are negative        Historical Information   Past Medical History:   Diagnosis Date    Arthritis     OA    Bruise of both arms     forearms and both hands    Bruises easily     Diabetes mellitus ([address] )     Eczema     Gout     Hyperlipidemia     Hypertension     Seasonal allergies     Toe infection     bilat great toes    Walks frequently     Wears dentures     upper    Wears glasses      Past Surgical History:   Procedure Laterality Date    CARPAL TUNNEL RELEASE Left     CARPAL TUNNEL RELEASE Right     KNEE ARTHROSCOPY Left     KNEE ARTHROSCOPY Right     [place] AMPUTATION TOE,I-P JT Bilateral 5/2/2017    Procedure: PARTIAL AMPUTATION RIGHT AND LEFT HALLUX ;  Surgeon: Joce Mckeon DPM;  [facility];  Service: Podiatry    [place] AMPUTATION TOE,MT-P JT Left 7/25/2017    Procedure: 2ND TOE AMPUTATION;  Surgeon: Joce Mckeon DPM;  [facility];  Service: Podiatry    SHOULDER ARTHROSCOPY Left     with screws,RTC    VASECTOMY       Social History   History   Alcohol Use No     History   Drug Use No     History   Smoking Status    Former Smoker    Packs/day: 0 25    Years: 20 00    Types: Cigarettes    Quit date: 2010   Smokeless Tobacco    Never Used     Comment: quit 7 years ago     Family History: History reviewed  No pertinent family history  Meds/Allergies   Current Outpatient Prescriptions   Medication Sig Dispense Refill    allopurinol (ZYLOPRIM) 300 mg tablet TAKE 1 TABLET DAILY  90 tablet 1    aspirin 81 MG tablet Take 1 tablet by mouth daily      Cholecalciferol (VITAMIN D-3) 1000 units CAPS Take 1 capsule by mouth every morning        Dupilumab (DUPIXENT SC) Inject under the skin every 14 (fourteen) days      glimepiride (AMARYL) 4 mg tablet Take 4 mg by mouth every morning before breakfast 2 tabs=8mg      glucose blood (ONE TOUCH ULTRA TEST) test strip 1 each by Other route 2 (two) times a day 100 each 3    losartan-hydrochlorothiazide (HYZAAR) 100-25 MG per tablet TAKE 1 TABLET EVERY DAY 90 tablet 0    metFORMIN (GLUCOPHAGE) 500 mg tablet Take 500 mg by mouth 2 (two) times a day with meals [id number] /twice a day      metoprolol tartrate (LOPRESSOR) 100 mg tablet TAKE 1 TABLET EVERY DAY 90 tablet 0    Multiple Vitamin (MULTIVITAMIN) tablet Take 1 tablet by mouth daily      saxagliptin (ONGLYZA) 5 MG tablet Take 5 mg by mouth every morning      simvastatin (ZOCOR) 40 mg tablet Take 40 mg by mouth every morning      Dapagliflozin Propanediol (FARXIGA) 5 MG TABS Take 1 tablet by mouth every morning Taken off for last 2 weeks by MD DEL ANGEL/T dehydration  Will go back on after surgery    Was taken off [place] and put on invokana which caused dehydration so will restart Farxiga after surgery  No current facility-administered medications for this visit  Allergies   Allergen Reactions    Lamisil [Terbinafine] Rash    Other Swelling     Pomegranate - facial swelling, no swelling of tongue, esophagus  Adhesive tape   Latex Rash       Objective   Vitals: Blood pressure 148/88, pulse 82, height 5' 9" (1 753 m), weight 101 kg (223 lb)  Physical Exam   Constitutional: He is oriented to person, place, and time  He appears well-developed and well-nourished  No distress  HENT:   Head: Normocephalic and atraumatic  Mouth/Throat: Oropharynx is clear and moist and mucous membranes are normal  No oropharyngeal exudate  Eyes: Conjunctivae, EOM and lids are normal  Right eye exhibits no discharge  Left eye exhibits no discharge  No scleral icterus  Neck: Neck supple  No thyromegaly present  Cardiovascular: Normal rate, regular rhythm and normal heart sounds  Exam reveals no gallop and no friction rub  No murmur heard  Pulmonary/Chest: Effort normal and breath sounds normal  No respiratory distress  He has no wheezes  Abdominal: Soft  Bowel sounds are normal  He exhibits no distension  There is no tenderness  Musculoskeletal: Normal range of motion  He exhibits no edema, tenderness or deformity  Lymphadenopathy:        Head (right side): No occipital adenopathy present  Head (left side): No occipital adenopathy present  Right: No supraclavicular adenopathy present  Left: No supraclavicular adenopathy present  Neurological: He is alert and oriented to person, place, and time  No cranial nerve deficit  Coordination normal    Skin: Skin is warm and intact  No rash noted  He is not diaphoretic  No erythema  Psychiatric: He has a normal mood and affect  His behavior is normal    Vitals reviewed  The history was obtained from the review of the chart, patient      Lab Results: Lab Results   Component Value Date/Time    Hemoglobin A1C 6 9 (H) 03/15/2018 08:02 AM    Hemoglobin A1C 8 1 (H) 07/13/2017 06:10 PM    WBC 11 24 (H) 08/16/2017 01:36 PM    WBC 11 29 (H) 07/13/2017 06:10 PM    WBC 12 96 (H) 04/24/2017 02:55 PM    WBC 14 4 (H) 04/10/2017 12:00 AM    WBC NONE SEEN 04/10/2017 12:00 AM    Hemoglobin 12 7 08/16/2017 01:36 PM    Hemoglobin 13 1 07/13/2017 06:10 PM    Hemoglobin 11 9 (L) 04/24/2017 02:55 PM    Hemoglobin 13 2 04/10/2017 12:00 AM    Hematocrit 36 8 08/16/2017 01:36 PM    Hematocrit 35 9 (L) 07/13/2017 06:10 PM    Hematocrit 35 9 (L) 04/24/2017 02:55 PM    Hematocrit 39 7 04/10/2017 12:00 AM    MCV 89 08/16/2017 01:36 PM    MCV 87 07/13/2017 06:10 PM    MCV 92 04/24/2017 02:55 PM    MCV 91 1 04/10/2017 12:00 AM    Platelets 171 14/56/0200 01:36 PM    Platelets 749 31/04/8297 06:10 PM    Platelets 795 (H) 95/45/2169 02:55 PM    Platelets 596 (H) 33/02/5781 12:00 AM    BUN 19 03/15/2018 08:02 AM    BUN 17 08/16/2017 01:36 PM    BUN 24 07/07/2017 09:54 AM    BUN 19 04/10/2017 12:00 AM    Sodium 136 03/15/2018 08:02 AM    Sodium 138 08/16/2017 01:36 PM    Sodium 137 07/07/2017 09:54 AM    Sodium 132 (L) 04/10/2017 12:00 AM    Potassium 4 2 03/15/2018 08:02 AM    Potassium 5 0 08/16/2017 01:36 PM    Potassium 4 4 07/07/2017 09:54 AM    Potassium 5 0 04/10/2017 12:00 AM    Chloride 102 03/15/2018 08:02 AM    Chloride 102 08/16/2017 01:36 PM    Chloride 102 07/07/2017 09:54 AM    Chloride 96 (L) 04/10/2017 12:00 AM    CO2 28 03/15/2018 08:02 AM    CO2 27 08/16/2017 01:36 PM    CO2 26 07/07/2017 09:54 AM    CO2 23 04/10/2017 12:00 AM    Creatinine 0 94 03/15/2018 08:02 AM    Creatinine 0 75 08/16/2017 01:36 PM    Creatinine 0 81 07/07/2017 09:54 AM    Creatinine 1 02 04/10/2017 12:00 AM    AST 31 03/15/2018 08:02 AM    AST 26 08/16/2017 01:36 PM    AST 23 07/07/2017 09:54 AM    AST 22 04/10/2017 12:00 AM    ALT 37 03/15/2018 08:02 AM    ALT 38 08/16/2017 01:36 PM    ALT 38 07/07/2017 09:54 AM    ALT 21 04/10/2017 12:00 AM    Albumin 3 2 (L) 03/15/2018 08:02 AM    Albumin 3 7 08/16/2017 01:36 PM    Albumin 3 8 07/07/2017 09:54 AM    Albumin 3 5 (L) 04/10/2017 12:00 AM    Cholesterol 130 03/15/2018 08:02 AM    Cholesterol 155 07/07/2017 09:54 AM    HDL, Direct 37 (L) 03/15/2018 08:02 AM    HDL, Direct 44 07/07/2017 09:54 AM    Triglycerides 197 (H) 03/15/2018 08:02 AM    Triglycerides 237 (H) 07/07/2017 09:54 AM     Most recent microalbumin to creatinine ratio is 3538  Imaging Studies: I have personally reviewed pertinent reports  Portions of the record may have been created with voice recognition software  Occasional wrong word or "sound a like" substitutions may have occurred due to the inherent limitations of voice recognition software  Read the chart carefully and recognize, using context, where substitutions have occurred

## 2018-04-05 ENCOUNTER — TELEPHONE (OUTPATIENT)
Dept: ENDOCRINOLOGY | Facility: CLINIC | Age: 63
End: 2018-04-05

## 2018-04-05 NOTE — TELEPHONE ENCOUNTER
Pt's wife called lm stating that since the Onglyza is no longer covered by insurance they called to find out what was covered  Tradjenta and Januvia are the alternatives   Please advise which would be best

## 2018-04-09 DIAGNOSIS — E11.40 TYPE 2 DIABETES MELLITUS WITH DIABETIC NEUROPATHY, UNSPECIFIED WHETHER LONG TERM INSULIN USE (HCC): Primary | ICD-10-CM

## 2018-04-10 NOTE — TELEPHONE ENCOUNTER
Prior auth for Onglyza was denied  Pt must try and fail Tradjenta and Januvia for Onglyza to be approved  I spoke to pt and he is aware  According to Shayla(wife), [facility]in[facility]

## 2018-04-12 DIAGNOSIS — E11.40 TYPE 2 DIABETES MELLITUS WITH DIABETIC NEUROPATHY, UNSPECIFIED WHETHER LONG TERM INSULIN USE (HCC): ICD-10-CM

## 2018-04-13 DIAGNOSIS — E78.5 HYPERLIPIDEMIA, UNSPECIFIED HYPERLIPIDEMIA TYPE: ICD-10-CM

## 2018-04-13 RX ORDER — SIMVASTATIN 40 MG
TABLET ORAL
Qty: 90 TABLET | Refills: 1 | Status: SHIPPED | OUTPATIENT
Start: 2018-04-13 | End: 2018-11-27 | Stop reason: SDUPTHER

## 2018-04-16 DIAGNOSIS — E11.49 OTHER DIABETIC NEUROLOGICAL COMPLICATION ASSOCIATED WITH TYPE 2 DIABETES MELLITUS (HCC): Primary | ICD-10-CM

## 2018-04-19 ENCOUNTER — TELEPHONE (OUTPATIENT)
Dept: FAMILY MEDICINE CLINIC | Facility: CLINIC | Age: 63
End: 2018-04-19

## 2018-04-19 NOTE — TELEPHONE ENCOUNTER
Pts wife Lily Loera called, requesting samples of Onglyza  His insurance is not covering it and they do not want to switch to something else, as this is working  She states that he will be eligible for Medicare in August, and is going to pay for a 90 prescription, and with samples from us for 30 days, that will get him to August, and they are hoping that it will be more reasonably priced through Medicare  Please advise  Thank you

## 2018-05-23 DIAGNOSIS — I10 ESSENTIAL HYPERTENSION: ICD-10-CM

## 2018-05-23 RX ORDER — LOSARTAN POTASSIUM AND HYDROCHLOROTHIAZIDE 25; 100 MG/1; MG/1
1 TABLET ORAL DAILY
Qty: 90 TABLET | Refills: 0 | Status: SHIPPED | OUTPATIENT
Start: 2018-05-23 | End: 2018-05-31

## 2018-05-30 NOTE — TELEPHONE ENCOUNTER
For insurance, pt needs scripts sent to [facility] in [place]   I called and cancelled script for losartan-HCTZ that was sent to [facility] and called it in to [facility]

## 2018-05-31 ENCOUNTER — OFFICE VISIT (OUTPATIENT)
Dept: NEPHROLOGY | Facility: CLINIC | Age: 63
End: 2018-05-31
Payer: COMMERCIAL

## 2018-05-31 VITALS
SYSTOLIC BLOOD PRESSURE: 170 MMHG | HEIGHT: 69 IN | BODY MASS INDEX: 34.21 KG/M2 | WEIGHT: 231 LBS | DIASTOLIC BLOOD PRESSURE: 56 MMHG

## 2018-05-31 DIAGNOSIS — I10 ESSENTIAL (PRIMARY) HYPERTENSION: ICD-10-CM

## 2018-05-31 DIAGNOSIS — M10.9 GOUT: ICD-10-CM

## 2018-05-31 DIAGNOSIS — N28.9 NEPHROPATHY: ICD-10-CM

## 2018-05-31 DIAGNOSIS — I10 ESSENTIAL HYPERTENSION: Primary | ICD-10-CM

## 2018-05-31 DIAGNOSIS — R60.0 BILATERAL LEG EDEMA: ICD-10-CM

## 2018-05-31 DIAGNOSIS — R80.1 PERSISTENT PROTEINURIA: ICD-10-CM

## 2018-05-31 PROCEDURE — 99244 OFF/OP CNSLTJ NEW/EST MOD 40: CPT | Performed by: INTERNAL MEDICINE

## 2018-05-31 RX ORDER — CHLORTHALIDONE 25 MG/1
25 TABLET ORAL DAILY
Qty: 90 TABLET | Refills: 3 | Status: SHIPPED | OUTPATIENT
Start: 2018-05-31 | End: 2018-11-27

## 2018-05-31 RX ORDER — FUROSEMIDE 40 MG/1
40 TABLET ORAL 2 TIMES DAILY PRN
COMMUNITY
End: 2018-10-22 | Stop reason: SDUPTHER

## 2018-05-31 RX ORDER — HYDROXYZINE HCL 10 MG/5 ML
10 SOLUTION, ORAL ORAL 3 TIMES DAILY
COMMUNITY
End: 2018-10-29 | Stop reason: SDUPTHER

## 2018-05-31 RX ORDER — LOSARTAN POTASSIUM 100 MG/1
100 TABLET ORAL DAILY
Qty: 90 TABLET | Refills: 3 | Status: SHIPPED | OUTPATIENT
Start: 2018-05-31 | End: 2018-11-27

## 2018-05-31 NOTE — PROGRESS NOTES
OFFICE CONSULT - Nephrology   Lan Ballesteros 57 y o  male MRN: [id number]    Encounter: [id number]          ASSESSMENT and PLAN:  Priyanka Hernandez was seen today for an initial consultation    He is 57years old with a past medical history of type 2 diabetes mellitus times 20 years, hypertension that is uncontrolled with average blood pressures around 145/70, gout with no recent attack, hyperlipidemia who presents for evaluation of proteinuria    Diagnoses and all orders for this visit:    Essential (primary) hypertension  - blood pressures currently are uncontrolled with average blood pressures at home around 145/70  - given his age and comorbidities his goal blood pressure should be 120 to 130 over 70-80  - he is currently on amlodipine 10 mg daily, metoprolol 50 mg twice daily, losartan 100 mg daily, and hydrochlorothiazide 25 mg daily  - his office blood pressures are high as well, initially be will discontinue hydrochlorothiazide 25 mg daily and start chlorthalidone 25 mg daily as this has been shown to be more effective in blood pressure control  - will repeat  Labs in 1 week to include electrolytes  - have asked him to check his blood pressures daily for the next 2 weeks in either send me a note in my chart or call to let me know what his average blood pressures are at home  - in 6 weeks upon follow-up have asked him to bring his blood pressure cuff to correlate with ours  - he may benefit from a secondary workup, if his blood pressures remain uncontrolled, renin and aldosterone will be inaccurate at this point and do not want to stop his current medications as risks of this may outweigh benefits given his chronic comorbidities  - if blood pressures are persistently high however we may need to consider further secondary workup  - his echocardiogram was reviewed as well he should have further follow-up with cardiology to monitor his valvular heart disease- he does have LVH so blood pressure control is very important and this was discussed in detail with him    Persistent proteinuria  - in the setting of prolonged diabetes  - in the setting of diabetes his microalbumin to creatinine ratio is greater than 5000  - he does have a history of ischemia in the lower extremity but no retinopathy that he reports  -  Will complete a serologic workup that include an GONZALO, double-stranded DNA, complements, ANCA, SPEP, UPEP, anti-GBM  - will check a urine protein to creatinine ratio  - continue ARB, and thiazide like diuretic can improve proteinuria as well  -  Will make further recommendations once blood work is complete    Nephropathy  - as above regarding proteinuria does have lower extremity edema and may have nephrotic syndrome will rule out serologically any other cause of proteinuric kidney disease    Gout  - on allopurinol 300 mg daily  - has been gout free, will monitor and continue    Bilateral leg edema  - left greater than right,  - He does take furosemide as needed however have asked him to adhere to a low-sodium diet and try compression stockings or Ace wrap, he denies any pain any warmth in the area on his right leg    DISCUSSION:    Patient Instructions     Mr Alma Kowalskichment, you are here today for follow-up of protein in the urine  We recommend the followin  Your office blood pressure is elevated but more importantly your home blood pressures are not at goal   Your goal blood pressure should be 120/80 as this will reduce your risk for worsening heart disease, stroke, renal failure  2  Continue to check your blood pressures as you are daily write them down and call me or send me a message in my chart in 2 weeks to let me know your blood pressure range  3  UR currently on 4 blood pressure medication, that includes amlodipine 10 mg daily, metoprolol 50 mg twice daily, losartan 100 mg daily, and we will change your hydrochlorothiazide to chlorthalidone 25 mg daily  4   Given that she would have the protein in the urine we will look for additional causes other than diabetes by checking repeat blood work and urine studies that have been ordered and we will call you either way with those results  5  We will make further adjustments to help but she the blood pressure goal as well if needed  6  We will follow up in about 6 weeks in the office and in about 2 weeks over the phone or through my chart  7  Please bring your blood pressure cuff with you to your next  Appointment  follow-up in 6 months    HPI:  Brie Mcbride is a 57 y o male who was referred by Dr Roberts for evaluation of  Initial evaluation     he is 57years old and has a history of type 2 diabetes mellitus currently on oral medications and follows up with Endocrinology, it was noticed that he had a microalbumin to creatinine ratio that was elevated  To 3538, his hemoglobin A1c did improve from a 1 1-6 9        he has been feeling okay although he does admit that his blood pressures have been uncontrolled at home his average blood pressure is 145/70, his endocrinologist did start him on amlodipine recently but his blood pressure still remain elevated  He denies any chest pain or shortness of Breath no fevers or chills no nausea vomiting diarrhea or constipation  Does not notice any excessive foam in his urine no bloody urine does have increased edema in the lower extremities takes Lasix as needed but still remains      otherwise he continues to deal with his other comorbidities that include his diabetes and his A1cs are improving, he states that he was started on 1 medication recently for his diabetes but has noticed that he has had some increased myalgias as well as more fatigue he is going to be talking to his endocrinology team about this  I personally spent over half of a total 60 minutes face to face with the patient in counseling and discussion and/or coordination of care as described above      ROS: All the systems were reviewed and were negative except as documented on the HPI     Allergies: Lamisil [terbinafine]; Other; and Latex    Medications:   Current Outpatient Prescriptions:     allopurinol (ZYLOPRIM) 300 mg tablet, TAKE 1 TABLET DAILY  , Disp: 90 tablet, Rfl: 1    amLODIPine (NORVASC) 10 mg tablet, Take 1 tablet (10 mg total) by mouth daily, Disp: 90 tablet, Rfl: 3    aspirin 81 MG tablet, Take 1 tablet by mouth daily, Disp: , Rfl:     Cholecalciferol (VITAMIN D-3) 1000 units CAPS, Take 1 capsule by mouth every morning  , Disp: , Rfl:     Dupilumab (DUPIXENT SC), Inject under the skin every 14 (fourteen) days, Disp: , Rfl:     furosemide (LASIX) 40 mg tablet, Take 40 mg by mouth 2 (two) times a day, Disp: , Rfl:     glimepiride (AMARYL) 4 mg tablet, Take 4 mg by mouth every morning before breakfast 2 tabs=8mg, Disp: , Rfl:     glucose blood (ONE TOUCH ULTRA TEST) test strip, 1 each by Other route 2 (two) times a day, Disp: 100 each, Rfl: 3    hydrOXYzine (ATARAX) 10 mg/5 mL syrup, Take 10 mg by mouth 3 (three) times a day, Disp: , Rfl:     Insulin Degludec-Liraglutide 100-3 6 UNIT-MG/ML SOPN, Inject 30 Units under the skin daily, Disp: 10 pen, Rfl: 3    metFORMIN (GLUCOPHAGE) 500 mg tablet, Take 500 mg by mouth 2 (two) times a day with meals [id number] /twice a day, Disp: , Rfl:     metoprolol tartrate (LOPRESSOR) 100 mg tablet, TAKE 1 TABLET EVERY DAY, Disp: 90 tablet, Rfl: 0    Multiple Vitamin (MULTIVITAMIN) tablet, Take 1 tablet by mouth daily, Disp: , Rfl:     omega-3-acid ethyl esters (LOVAZA) 1 g capsule, Take 2 capsules (2 g total) by mouth 2 (two) times a day, Disp: 360 capsule, Rfl: 3    saxagliptin (ONGLYZA) 5 MG tablet, Take 1 tablet (5 mg total) by mouth daily, Disp: 30 tablet, Rfl: 5    simvastatin (ZOCOR) 40 mg tablet, TAKE 1 TABLET DAILY, Disp: 90 tablet, Rfl: 1    chlorthalidone 25 mg tablet, Take 1 tablet (25 mg total) by mouth daily, Disp: 90 tablet, Rfl: 3    losartan (COZAAR) 100 MG tablet, Take 1 tablet (100 mg total) by mouth daily, Disp: 90 tablet, Rfl: 3    Past Medical History:   Diagnosis Date    Arthritis     OA    Bruise of both arms     forearms and both hands    Bruises easily     Diabetes mellitus ([address] )     Eczema     Gout     Hyperlipidemia     Hypertension     Seasonal allergies     Toe infection     bilat great toes    Walks frequently     Wears dentures     upper    Wears glasses      Past Surgical History:   Procedure Laterality Date    CARPAL TUNNEL RELEASE Left     CARPAL TUNNEL RELEASE Right     KNEE ARTHROSCOPY Left     KNEE ARTHROSCOPY Right     [place] AMPUTATION TOE,I-P JT Bilateral 5/2/2017    Procedure: PARTIAL AMPUTATION RIGHT AND LEFT HALLUX ;  Surgeon: Hector Pal DPM;  [facility];  Service: Podiatry    [place] AMPUTATION TOE,MT-P JT Left 7/25/2017    Procedure: 2ND TOE AMPUTATION;  Surgeon: Hector Pal DPM;  [facility];  Service: Podiatry    SHOULDER ARTHROSCOPY Left     with screws,RTC    VASECTOMY       Family History   Problem Relation Age of Onset    No Known Problems Mother     Hypertension Father       reports that he quit smoking about 8 years ago  His smoking use included Cigarettes  He has a 5 00 pack-year smoking history  He has never used smokeless tobacco  He reports that he does not drink alcohol or use drugs  Physical Exam:   Vitals:    05/31/18 1025   BP: 170/56   BP Location: Right arm   Patient Position: Sitting   Cuff Size: Standard   Weight: 105 kg (231 lb)   Height: 5' 9" (1 753 m)     Body mass index is 34 11 kg/m²      General: conscious, cooperative, in not acute distress  Eyes: conjunctivae pink, anicteric sclerae  ENT: lips and mucous membranes moist  Neck: supple, no JVD  Chest: clear breath sounds bilateral, no crackles, ronchus or wheezings  CVS: distinct S1 & S2, normal rate, regular rhythm  Abdomen: soft, non-tender, non-distended, normoactive bowel sounds  Extremities:   2+ pitting edema  Skin: no rash  Neuro: awake, alert, oriented      Lab Results: Results for orders placed or performed in visit on 03/15/18   Hemoglobin A1c   Result Value Ref Range    Hemoglobin A1C 6 9 (H) 4 2 - 6 3 %     mg/dl   Lipid panel   Result Value Ref Range    Cholesterol 130 50 - 200 mg/dL    Triglycerides 197 (H) <=150 mg/dL    HDL, Direct 37 (L) 40 - 60 mg/dL    LDL Calculated 54 0 - 100 mg/dL   Comprehensive metabolic panel   Result Value Ref Range    Sodium 136 136 - 145 mmol/L    Potassium 4 2 3 5 - 5 3 mmol/L    Chloride 102 100 - 108 mmol/L    CO2 28 21 - 32 mmol/L    Anion Gap 6 4 - 13 mmol/L    BUN 19 5 - 25 mg/dL    Creatinine 0 94 0 60 - 1 30 mg/dL    Glucose, Fasting 147 (H) 65 - 99 mg/dL    Calcium 9 3 8 3 - 10 1 mg/dL    AST 31 5 - 45 U/L    ALT 37 12 - 78 U/L    Alkaline Phosphatase 66 46 - 116 U/L    Total Protein 7 1 6 4 - 8 2 g/dL    Albumin 3 2 (L) 3 5 - 5 0 g/dL    Total Bilirubin 0 71 0 20 - 1 00 mg/dL    eGFR 85 ml/min/1 73sq m   Microalbumin / creatinine urine ratio   Result Value Ref Range    Creatinine, Ur 145 0 mg/dL    Microalbum  ,U,Random 5,130 0 (H) 0 0 - 20 0 mg/L    Microalb Creat Ratio 3,538 (H) 0 - 30 mg/g creatinine       Echocardiogram 8/9/15  - left ventricle had an EF of 60% wall thickness was mildly increased with mild concentric hypertrophy and an abnormal left ventricular relaxation  - left atrium was mildly to moderately dilated  - right atrium was mildly dilated  - mitral valve shows mild annular calcification    Portions of the record may have been created with voice recognition software  Occasional wrong word or "sound a like" substitutions may have occurred due to the inherent limitations of voice recognition software  Read the chart carefully and recognize, using context, where substitutions have occurred  If you have any questions, please contact the dictating provider

## 2018-05-31 NOTE — PATIENT INSTRUCTIONS
Mr Solomon Irwin here today for follow-up of protein in the urine  We recommend the followin  Your office blood pressure is elevated but more importantly your home blood pressures are not at goal   Your goal blood pressure should be 120/80 as this will reduce your risk for worsening heart disease, stroke, renal failure  2  Continue to check your blood pressures as you are daily write them down and call me or send me a message in my chart in 2 weeks to let me know your blood pressure range  3  UR currently on 4 blood pressure medication, that includes amlodipine 10 mg daily, metoprolol 50 mg twice daily, losartan 100 mg daily, and we will change your hydrochlorothiazide to chlorthalidone 25 mg daily  4  Given that she would have the protein in the urine we will look for additional causes other than diabetes by checking repeat blood work and urine studies that have been ordered and we will call you either way with those results  5  We will make further adjustments to help but she the blood pressure goal as well if needed  6  We will follow up in about 6 weeks in the office and in about 2 weeks over the phone or through my chart  7  Please bring your blood pressure cuff with you to your next  Appointment  Chronic Hypertension   WHAT YOU NEED TO KNOW:   Hypertension is high blood pressure (BP)  Your BP is the force of your blood moving against the walls of your arteries  Normal BP is less than 120/80  Prehypertension is between 120/80 and 139/89  Hypertension is 140/90 or higher  Hypertension causes your BP to get so high that your heart has to work much harder than normal  This can damage your heart  Chronic hypertension is a long-term condition that you can control with a healthy lifestyle or medicines  A controlled blood pressure helps protect your organs, such as your heart, lungs, brain, and kidneys     DISCHARGE INSTRUCTIONS:   Call 911 for any of the following:   · You have discomfort in your chest that feels like squeezing, pressure, fullness, or pain  · You become confused or have difficulty speaking  · You suddenly feel lightheaded or have trouble breathing  · You have pain or discomfort in your back, neck, jaw, stomach, or arm  Return to the emergency department if:   · You have a severe headache or vision loss  · You have weakness in an arm or leg  Contact your healthcare provider if:   · You feel faint, dizzy, confused, or drowsy  · You have been taking your BP medicine and your BP is still higher than your healthcare provider says it should be  · You have questions or concerns about your condition or care  Medicines: You may need any of the following:  · Medicine  may be used to help lower your BP  You may need more than one type of medicine  Take the medicine exactly as directed  · Diuretics  help decrease extra fluid that collects in your body  This will help lower your BP  You may urinate more often while you take this medicine  · Cholesterol medicine  helps lower your cholesterol level  A low cholesterol level helps prevent heart disease and makes it easier to control your blood pressure  · Take your medicine as directed  Contact your healthcare provider if you think your medicine is not helping or if you have side effects  Tell him or her if you are allergic to any medicine  Keep a list of the medicines, vitamins, and herbs you take  Include the amounts, and when and why you take them  Bring the list or the pill bottles to follow-up visits  Carry your medicine list with you in case of an emergency  Follow up with your healthcare provider as directed: You will need to return to have your blood pressure checked and to have other lab tests done  Write down your questions so you remember to ask them during your visits  Manage chronic hypertension:  Talk with your healthcare provider about these and other ways to manage hypertension:  · Take your BP at home    Sit and rest for 5 minutes before you take your BP  Extend your arm and support it on a flat surface  Your arm should be at the same level as your heart  Follow the directions that came with your BP monitor  If possible, take at least 2 BP readings each time  Take your BP at least twice a day at the same times each day, such as morning and evening  Keep a record of your BP readings and bring it to your follow-up visits  Ask your healthcare provider what your blood pressure should be  · Limit sodium (salt) as directed  Too much sodium can affect your fluid balance  Check labels to find low-sodium or no-salt-added foods  Some low-sodium foods use potassium salts for flavor  Too much potassium can also cause health problems  Your healthcare provider will tell you how much sodium and potassium are safe for you to have in a day  He or she may recommend that you limit sodium to 2,300 mg a day  · Follow the meal plan recommended by your healthcare provider  A dietitian or your provider can give you more information on low-sodium plans or the DASH (Dietary Approaches to Stop Hypertension) eating plan  The DASH plan is low in sodium, unhealthy fats, and total fat  It is high in potassium, calcium, and fiber  · Exercise to maintain a healthy weight  Exercise at least 30 minutes per day, on most days of the week  This will help decrease your blood pressure  Ask about the best exercise plan for you  · Decrease stress  This may help lower your BP  Learn ways to relax, such as deep breathing or listening to music  · Limit alcohol  Women should limit alcohol to 1 drink a day  Men should limit alcohol to 2 drinks a day  A drink of alcohol is 12 ounces of beer, 5 ounces of wine, or 1½ ounces of liquor  · Do not smoke  Nicotine and other chemicals in cigarettes and cigars can increase your BP and also cause lung damage   Ask your healthcare provider for information if you currently smoke and need help to quit  E-cigarettes or smokeless tobacco still contain nicotine  Talk to your healthcare provider before you use these products  © 2017 [address] [employer]  Information is for End User's use only and may not be sold, redistributed or otherwise used for commercial purposes  All illustrations and images included in CareNotes® are the copyrighted property of [employer]  or B[employer] Giral[employer]  The above information is an  only  It is not intended as medical advice for individual conditions or treatments  Talk to your doctor, nurse or pharmacist before following any medical regimen to see if it is safe and effective for you  Low-Sodium Diet   WHAT YOU NEED TO KNOW:   What is a low-sodium diet? A low-sodium diet limits foods that are high in sodium (salt)  You will need to follow a low-sodium diet if you have high blood pressure, kidney disease, or heart failure  You may also need to follow this diet if you have a condition that is causing your body to retain (hold) extra fluid  You may need to limit the amount of sodium you eat to 1,500 mg  Ask your healthcare provider how much sodium you can have each day  How can I use food labels to choose foods that are low in sodium? Read food labels to find the amount of sodium they contain  The amount of sodium is listed in milligrams (mg)  The % Daily Value (DV) column tells you how much of your daily needs are met by 1 serving of the food for each nutrient listed  Choose foods that have less than 5% of the DV of sodium  These foods are considered low in sodium  Foods that have 20% or more of the DV of sodium are considered high in sodium   Some food labels may also list any of the following terms that tell you about the sodium content in the food:  · Sodium-free:  Less than 5 mg in each serving    · Very low sodium:  35 mg of sodium or less in each serving    · Low sodium:  140 mg of sodium or less in each serving    · Reduced sodium: At least 25% less sodium in each serving than the regular type    · Light in sodium:  50% less sodium in each serving    · Unsalted or no added salt:  No extra salt is added during processing (the food may still contain sodium)  Which foods should I avoid? Salty foods are high in sodium  You should avoid the following:  · Processed foods:      ¨ Mixes for cornbread, biscuits, cake, and pudding     ¨ Instant foods, such as potatoes, cereals, noodles, and rice     ¨ Packaged foods, such as bread stuffing, rice and pasta mixes, snack dip mixes, and macaroni and cheese     ¨ Canned foods, such as canned vegetables, soups, broths, sauces, and vegetable or tomato juice    ¨ Snack foods, such as salted chips, popcorn, pretzels, pork rinds, salted crackers, and salted nuts    ¨ Frozen foods, such as dinners, entrees, vegetables with sauces, and breaded meats    ¨ Sauerkraut, pickled vegetables, and other foods prepared in brine    · Meats and cheeses:      ¨ Smoked or cured meat, such as corned beef, chun, ham, hot dogs, and sausage    ¨ Canned meats or spreads, such as potted meats, sardines, anchovies, and imitation seafood    ¨ Deli or lunch meats, such as bologna, ham, turkey, and roast beef    ¨ Processed cheese, such as American cheese and cheese spreads    · Condiments, sauces, and seasonings:      ¨ Salt (¼ teaspoon of salt contains 575 mg of sodium)    ¨ Seasonings made with salt, such as garlic salt, celery salt, onion salt, and seasoned salt    ¨ Regular soy sauce, barbecue sauce, teriyaki sauce, steak sauce, Worcestershire sauce, and most flavored vinegars    ¨ Canned gravy and mixes     ¨ Regular condiments, such as mustard, ketchup, and salad dressings    ¨ Pickles and olives    ¨ Meat tenderizers and monosodium glutamate (MSG)  Which foods can I include? Read the food label to find the amount of sodium in each serving  · Bread and cereal:  Try to choose breads with less than 80 mg of sodium per serving  ¨ Bread, roll, ariella, tortilla, or unsalted crackers  ¨ Ready-to-eat cereals with less than 5% DV of sodium (examples include shredded wheat and puffed rice)    ¨ Pasta    · Vegetables and fruits:      ¨ Unsalted fresh, frozen, or canned vegetables    ¨ Fresh, frozen, or canned fruits    ¨ Fruit juice    · Dairy:  One serving has about 150 mg of sodium  ¨ Milk, all types    ¨ Yogurt    ¨ Hard cheese, such as cheddar, Swiss, [employer], or mozzarella    · Meat and other protein foods:  Some raw meats may have added sodium  ¨ Plain meats, fish, and poultry     ¨ Egg    · Other foods:      ¨ Homemade pudding    ¨ Unsalted nuts, popcorn, or pretzels    ¨ Unsalted butter or margarine  What are some ways that I can decrease sodium? · Add spices and herbs to foods instead of salt during cooking  Use salt-free seasonings to add flavor to foods  Examples include onion powder, garlic powder, basil, drake powder, paprika, and parsley  Try lemon or lime juice or vinegar to give foods a tart flavor  Use hot peppers, pepper, or cayenne pepper to add a spicy flavor to foods  · Do not keep a salt shaker at your kitchen table  This may help keep you from adding salt to food at the table  It may take time to get used to enjoying the natural flavor of food instead of adding salt  Talk to your healthcare provider before you use salt substitutes  Some salt substitutes have a high amount of potassium and need to be avoided if you have kidney disease  · Choose low-sodium foods at restaurants  Meals from restaurants are often high in sodium  Some restaurants have nutrition information on the menu that tells you the amount of sodium in their foods  If possible, ask for your food to be prepared with less, or no salt  · Shop for unsalted or low-sodium foods and snacks at the grocery store  Examples include unsalted or low-sodium broths, soups, and canned vegetables  Choose fresh or frozen vegetables instead  Choose unsalted nuts or seeds or fresh fruits or vegetables as snacks  Read food labels and choose salt-free, very low-sodium, or low-sodium foods  CARE AGREEMENT:   You have the right to help plan your care  Discuss treatment options with your caregivers to decide what care you want to receive  You always have the right to refuse treatment  The above information is an  only  It is not intended as medical advice for individual conditions or treatments  Talk to your doctor, nurse or pharmacist before following any medical regimen to see if it is safe and effective for you  © 2017 [facility] [facility] Information is for End User's use only and may not be sold, redistributed or otherwise used for commercial purposes  All illustrations and images included in CareNotes® are the copyrighted property of [employer]  or [address]

## 2018-05-31 NOTE — LETTER
May 31, 2018     Ruby Pool, [facility],[address]  [address] [place] [address]    Patient: Rudy Marrufo   YOB: 1955   Date of Visit: 5/31/2018       Dear Dr Ayesha Emanuel: Thank you for referring Rudy Marrufo to me for evaluation  Below are my notes for this consultation  If you have questions, please do not hesitate to call me  I look forward to following your patient along with you           Sincerely,        Mariana Mendiola DO        CC: MD Mariana Lyons DO  5/31/2018 11:30 AM  Sign at close encounter  OFFICE CONSULT - Nephrology   Rudy Marrufo 57 y o  male MRN: [id number]    Encounter: [id number]          ASSESSMENT and PLAN:  Joy Oconnell was seen today for an initial consultation    He is 57years old with a past medical history of type 2 diabetes mellitus times 20 years, hypertension that is uncontrolled with average blood pressures around 145/70, gout with no recent attack, hyperlipidemia who presents for evaluation of proteinuria    Diagnoses and all orders for this visit:    Essential (primary) hypertension  - blood pressures currently are uncontrolled with average blood pressures at home around 145/70  - given his age and comorbidities his goal blood pressure should be 120 to 130 over 70-80  - he is currently on amlodipine 10 mg daily, metoprolol 50 mg twice daily, losartan 100 mg daily, and hydrochlorothiazide 25 mg daily  - his office blood pressures are high as well, initially be will discontinue hydrochlorothiazide 25 mg daily and start chlorthalidone 25 mg daily as this has been shown to be more effective in blood pressure control  - will repeat  Labs in 1 week to include electrolytes  - have asked him to check his blood pressures daily for the next 2 weeks in either send me a note in my chart or call to let me know what his average blood pressures are at home  - in 6 weeks upon follow-up have asked him to bring his blood pressure cuff to correlate with ours  - he may benefit from a secondary workup, if his blood pressures remain uncontrolled, renin and aldosterone will be inaccurate at this point and do not want to stop his current medications as risks of this may outweigh benefits given his chronic comorbidities  - if blood pressures are persistently high however we may need to consider further secondary workup  - his echocardiogram was reviewed as well he should have further follow-up with cardiology to monitor his valvular heart disease- he does have LVH so blood pressure control is very important and this was discussed in detail with him    Persistent proteinuria  - in the setting of prolonged diabetes  - in the setting of diabetes his microalbumin to creatinine ratio is greater than 5000  - he does have a history of ischemia in the lower extremity but no retinopathy that he reports  -  Will complete a serologic workup that include an GONZALO, double-stranded DNA, complements, ANCA, SPEP, UPEP, anti-GBM  - will check a urine protein to creatinine ratio  - continue ARB, and thiazide like diuretic can improve proteinuria as well  -  Will make further recommendations once blood work is complete    Nephropathy  - as above regarding proteinuria does have lower extremity edema and may have nephrotic syndrome will rule out serologically any other cause of proteinuric kidney disease    Gout  - on allopurinol 300 mg daily  - has been gout free, will monitor and continue    Bilateral leg edema  - left greater than right,  - He does take furosemide as needed however have asked him to adhere to a low-sodium diet and try compression stockings or Ace wrap, he denies any pain any warmth in the area on his right leg    DISCUSSION:    Patient Instructions     Mr Elayne Flowers, you are here today for follow-up of protein in the urine  We recommend the followin   Your office blood pressure is elevated but more importantly your home blood pressures are not at goal   Your goal blood pressure should be 120/80 as this will reduce your risk for worsening heart disease, stroke, renal failure  2  Continue to check your blood pressures as you are daily write them down and call me or send me a message in my chart in 2 weeks to let me know your blood pressure range  3  UR currently on 4 blood pressure medication, that includes amlodipine 10 mg daily, metoprolol 50 mg twice daily, losartan 100 mg daily, and we will change your hydrochlorothiazide to chlorthalidone 25 mg daily  4  Given that she would have the protein in the urine we will look for additional causes other than diabetes by checking repeat blood work and urine studies that have been ordered and we will call you either way with those results  5  We will make further adjustments to help but she the blood pressure goal as well if needed  6  We will follow up in about 6 weeks in the office and in about 2 weeks over the phone or through my chart  7  Please bring your blood pressure cuff with you to your next  Appointment  follow-up in 6 months    HPI:  Aparna Prieto is a 57 y o male who was referred by Dr Roberts for evaluation of  Initial evaluation     he is 57years old and has a history of type 2 diabetes mellitus currently on oral medications and follows up with Endocrinology, it was noticed that he had a microalbumin to creatinine ratio that was elevated  To 3538, his hemoglobin A1c did improve from a 1 1-6 9        he has been feeling okay although he does admit that his blood pressures have been uncontrolled at home his average blood pressure is 145/70, his endocrinologist did start him on amlodipine recently but his blood pressure still remain elevated  He denies any chest pain or shortness of Breath no fevers or chills no nausea vomiting diarrhea or constipation    Does not notice any excessive foam in his urine no bloody urine does have increased edema in the lower extremities takes Lasix as needed but still remains      otherwise he continues to deal with his other comorbidities that include his diabetes and his A1cs are improving, he states that he was started on 1 medication recently for his diabetes but has noticed that he has had some increased myalgias as well as more fatigue he is going to be talking to his endocrinology team about this  I personally spent over half of a total 60 minutes face to face with the patient in counseling and discussion and/or coordination of care as described above  ROS: All the systems were reviewed and were negative except as documented on the HPI  Allergies: Lamisil [terbinafine]; Other; and Latex    Medications:   Current Outpatient Prescriptions:     allopurinol (ZYLOPRIM) 300 mg tablet, TAKE 1 TABLET DAILY  , Disp: 90 tablet, Rfl: 1    amLODIPine (NORVASC) 10 mg tablet, Take 1 tablet (10 mg total) by mouth daily, Disp: 90 tablet, Rfl: 3    aspirin 81 MG tablet, Take 1 tablet by mouth daily, Disp: , Rfl:     Cholecalciferol (VITAMIN D-3) 1000 units CAPS, Take 1 capsule by mouth every morning  , Disp: , Rfl:     Dupilumab (DUPIXENT SC), Inject under the skin every 14 (fourteen) days, Disp: , Rfl:     furosemide (LASIX) 40 mg tablet, Take 40 mg by mouth 2 (two) times a day, Disp: , Rfl:     glimepiride (AMARYL) 4 mg tablet, Take 4 mg by mouth every morning before breakfast 2 tabs=8mg, Disp: , Rfl:     glucose blood (ONE TOUCH ULTRA TEST) test strip, 1 each by Other route 2 (two) times a day, Disp: 100 each, Rfl: 3    hydrOXYzine (ATARAX) 10 mg/5 mL syrup, Take 10 mg by mouth 3 (three) times a day, Disp: , Rfl:     Insulin Degludec-Liraglutide 100-3 6 UNIT-MG/ML SOPN, Inject 30 Units under the skin daily, Disp: 10 pen, Rfl: 3    metFORMIN (GLUCOPHAGE) 500 mg tablet, Take 500 mg by mouth 2 (two) times a day with meals [id number] /twice a day, Disp: , Rfl:     metoprolol tartrate (LOPRESSOR) 100 mg tablet, TAKE 1 TABLET EVERY DAY, Disp: 90 tablet, Rfl: 0    Multiple Vitamin (MULTIVITAMIN) tablet, Take 1 tablet by mouth daily, Disp: , Rfl:     omega-3-acid ethyl esters (LOVAZA) 1 g capsule, Take 2 capsules (2 g total) by mouth 2 (two) times a day, Disp: 360 capsule, Rfl: 3    saxagliptin (ONGLYZA) 5 MG tablet, Take 1 tablet (5 mg total) by mouth daily, Disp: 30 tablet, Rfl: 5    simvastatin (ZOCOR) 40 mg tablet, TAKE 1 TABLET DAILY, Disp: 90 tablet, Rfl: 1    chlorthalidone 25 mg tablet, Take 1 tablet (25 mg total) by mouth daily, Disp: 90 tablet, Rfl: 3    losartan (COZAAR) 100 MG tablet, Take 1 tablet (100 mg total) by mouth daily, Disp: 90 tablet, Rfl: 3    Past Medical History:   Diagnosis Date    Arthritis     OA    Bruise of both arms     forearms and both hands    Bruises easily     Diabetes mellitus (HCC)     Eczema     Gout     Hyperlipidemia     Hypertension     Seasonal allergies     Toe infection     bilat great toes    Walks frequently     Wears dentures     upper    Wears glasses      Past Surgical History:   Procedure Laterality Date    CARPAL TUNNEL RELEASE Left     CARPAL TUNNEL RELEASE Right     KNEE ARTHROSCOPY Left     KNEE ARTHROSCOPY Right     NV AMPUTATION TOE,I-P JT Bilateral 5/2/2017    Procedure: PARTIAL AMPUTATION RIGHT AND LEFT HALLUX ;  Surgeon: Devon Cifuentes DPM;  Location: [facility];  Service: Podiatry    NV AMPUTATION TOE,MT-P JT Left 7/25/2017    Procedure: 2ND TOE AMPUTATION;  Surgeon: Devon Cifuentes DPM;  Location: [facility];  Service: Podiatry    SHOULDER ARTHROSCOPY Left     with screws,RTC    VASECTOMY       Family History   Problem Relation Age of Onset    No Known Problems Mother     Hypertension Father       reports that he quit smoking about 8 years ago  His smoking use included Cigarettes  He has a 5 00 pack-year smoking history  He has never used smokeless tobacco  He reports that he does not drink alcohol or use drugs        Physical Exam:   Vitals:    05/31/18 1025   BP: 170/56   BP Location: Right arm   Patient Position: Sitting   Cuff Size: Standard Weight: 105 kg (231 lb)   Height: 5' 9" (1 753 m)     Body mass index is 34 11 kg/m²  General: conscious, cooperative, in not acute distress  Eyes: conjunctivae pink, anicteric sclerae  ENT: lips and mucous membranes moist  Neck: supple, no JVD  Chest: clear breath sounds bilateral, no crackles, ronchus or wheezings  CVS: distinct S1 & S2, normal rate, regular rhythm  Abdomen: soft, non-tender, non-distended, normoactive bowel sounds  Extremities:   2+ pitting edema  Skin: no rash  Neuro: awake, alert, oriented      Lab Results:     Results for orders placed or performed in visit on 03/15/18   Hemoglobin A1c   Result Value Ref Range    Hemoglobin A1C 6 9 (H) 4 2 - 6 3 %     mg/dl   Lipid panel   Result Value Ref Range    Cholesterol 130 50 - 200 mg/dL    Triglycerides 197 (H) <=150 mg/dL    HDL, Direct 37 (L) 40 - 60 mg/dL    LDL Calculated 54 0 - 100 mg/dL   Comprehensive metabolic panel   Result Value Ref Range    Sodium 136 136 - 145 mmol/L    Potassium 4 2 3 5 - 5 3 mmol/L    Chloride 102 100 - 108 mmol/L    CO2 28 21 - 32 mmol/L    Anion Gap 6 4 - 13 mmol/L    BUN 19 5 - 25 mg/dL    Creatinine 0 94 0 60 - 1 30 mg/dL    Glucose, Fasting 147 (H) 65 - 99 mg/dL    Calcium 9 3 8 3 - 10 1 mg/dL    AST 31 5 - 45 U/L    ALT 37 12 - 78 U/L    Alkaline Phosphatase 66 46 - 116 U/L    Total Protein 7 1 6 4 - 8 2 g/dL    Albumin 3 2 (L) 3 5 - 5 0 g/dL    Total Bilirubin 0 71 0 20 - 1 00 mg/dL    eGFR 85 ml/min/1 73sq m   Microalbumin / creatinine urine ratio   Result Value Ref Range    Creatinine, Ur 145 0 mg/dL    Microalbum  ,U,Random 5,130 0 (H) 0 0 - 20 0 mg/L    Microalb Creat Ratio 3,538 (H) 0 - 30 mg/g creatinine       Echocardiogram 8/9/15  - left ventricle had an EF of 60% wall thickness was mildly increased with mild concentric hypertrophy and an abnormal left ventricular relaxation  - left atrium was mildly to moderately dilated  - right atrium was mildly dilated  - mitral valve shows mild annular calcification    Portions of the record may have been created with voice recognition software  Occasional wrong word or "sound a like" substitutions may have occurred due to the inherent limitations of voice recognition software  Read the chart carefully and recognize, using context, where substitutions have occurred  If you have any questions, please contact the dictating provider

## 2018-05-31 NOTE — LETTER
May 31, 2018     Divina Leiva, [address] [facility],[address]  [address] [place] [address]    Patient: Thuan Vallecillo   YOB: 1955   Date of Visit: 5/31/2018       Dear Dr Roshan Barrientos: Thank you for referring Thuan Vallecillo to me for evaluation  Below are my notes for this consultation  If you have questions, please do not hesitate to call me  I look forward to following your patient along with you           Sincerely,        Emily Carrillo DO        CC: MD Emily Forbes DO  5/31/2018 11:29 AM  Sign at close encounter  [address] [facility] - Nephrology   Thuan Vallecillo 57 y o  male MRN: [id number]    Encounter: [id number]          ASSESSMENT and PLAN:  Annie Palmer was seen today for an initial consultation    He is 57years old with a past medical history of type 2 diabetes mellitus times 20 years, hypertension that is uncontrolled with average blood pressures around 145/70, gout with no recent attack, hyperlipidemia who presents for evaluation of proteinuria    Diagnoses and all orders for this visit:    Essential (primary) hypertension  - blood pressures currently are uncontrolled with average blood pressures at home around 145/70  - given his age and comorbidities his goal blood pressure should be 120 to 130 over 70-80  - he is currently on amlodipine 10 mg daily, metoprolol 50 mg twice daily, losartan 100 mg daily, and hydrochlorothiazide 25 mg daily  - his office blood pressures are high as well, initially be will discontinue hydrochlorothiazide 25 mg daily and start chlorthalidone 25 mg daily as this has been shown to be more effective in blood pressure control  - will repeat  Labs in 1 week to include electrolytes  - have asked him to check his blood pressures daily for the next 2 weeks in either send me a note in my chart or call to let me know what his average blood pressures are at home  - in 6 weeks upon follow-up have asked him to bring his blood pressure cuff to correlate with ours  - he may benefit from a secondary workup, if his blood pressures remain uncontrolled, renin and aldosterone will be inaccurate at this point and do not want to stop his current medications as risks of this may outweigh benefits given his chronic comorbidities  - if blood pressures are persistently high however we may need to consider further secondary workup  - his echocardiogram was reviewed as well he should have further follow-up with cardiology to monitor his valvular heart disease- he does have LVH so blood pressure control is very important and this was discussed in detail with him    Persistent proteinuria  - in the setting of prolonged diabetes  - in the setting of diabetes his microalbumin to creatinine ratio is greater than 5000  - he does have a history of ischemia in the lower extremity but no retinopathy that he reports  -  Will complete a serologic workup that include an GONZALO, double-stranded DNA, complements, ANCA, SPEP, UPEP, anti-GBM  - will check a urine protein to creatinine ratio  - continue ARB, and thiazide like diuretic can improve proteinuria as well  -  Will make further recommendations once blood work is complete    Nephropathy  - as above regarding proteinuria does have lower extremity edema and may have nephrotic syndrome will rule out serologically any other cause of proteinuric kidney disease    Gout  - on allopurinol 300 mg daily  - has been gout free, will monitor and continue    Bilateral leg edema  - left greater than right,  - He does take furosemide as needed however have asked him to adhere to a low-sodium diet and try compression stockings or Ace wrap, he denies any pain any warmth in the area on his right leg    DISCUSSION:    Patient Instructions     Mr Félix Teresa, you are here today for follow-up of protein in the urine  We recommend the followin   Your office blood pressure is elevated but more importantly your home blood pressures are not at goal   Your goal blood pressure should be 120/80 as this will reduce your risk for worsening heart disease, stroke, renal failure  2  Continue to check your blood pressures as you are daily write them down and call me or send me a message in my chart in 2 weeks to let me know your blood pressure range  3  UR currently on 4 blood pressure medication, that includes amlodipine 10 mg daily, metoprolol 50 mg twice daily, losartan 100 mg daily, and we will change your hydrochlorothiazide to chlorthalidone 25 mg daily  4  Given that she would have the protein in the urine we will look for additional causes other than diabetes by checking repeat blood work and urine studies that have been ordered and we will call you either way with those results  5  We will make further adjustments to help but she the blood pressure goal as well if needed  6  We will follow up in about 6 weeks in the office and in about 2 weeks over the phone or through my chart  7  Please bring your blood pressure cuff with you to your next  Appointment  follow-up in 6 months    HPI:  Ksenia Buchanan is a 57 y o male who was referred by Dr Roberts for evaluation of  Initial evaluation     he is 57years old and has a history of type 2 diabetes mellitus currently on oral medications and follows up with Endocrinology, it was noticed that he had a microalbumin to creatinine ratio that was elevated  To 3538, his hemoglobin A1c did improve from a 1 1-6 9        he has been feeling okay although he does admit that his blood pressures have been uncontrolled at home his average blood pressure is 145/70, his endocrinologist did start him on amlodipine recently but his blood pressure still remain elevated  He denies any chest pain or shortness of Breath no fevers or chills no nausea vomiting diarrhea or constipation    Does not notice any excessive foam in his urine no bloody urine does have increased edema in the lower extremities takes Lasix as needed but still remains      otherwise he continues to deal with his other comorbidities that include his diabetes and his A1cs are improving, he states that he was started on 1 medication recently for his diabetes but has noticed that he has had some increased myalgias as well as more fatigue he is going to be talking to his endocrinology team about this  I personally spent over half of a total *** minutes face to face with the patient in counseling and discussion and/or coordination of care as described above  ROS: All the systems were reviewed and were negative except as documented on the HPI  Allergies: Lamisil [terbinafine]; Other; and Latex    Medications:   Current Outpatient Prescriptions:     allopurinol (ZYLOPRIM) 300 mg tablet, TAKE 1 TABLET DAILY  , Disp: 90 tablet, Rfl: 1    amLODIPine (NORVASC) 10 mg tablet, Take 1 tablet (10 mg total) by mouth daily, Disp: 90 tablet, Rfl: 3    aspirin 81 MG tablet, Take 1 tablet by mouth daily, Disp: , Rfl:     Cholecalciferol (VITAMIN D-3) 1000 units CAPS, Take 1 capsule by mouth every morning  , Disp: , Rfl:     Dupilumab (DUPIXENT SC), Inject under the skin every 14 (fourteen) days, Disp: , Rfl:     furosemide (LASIX) 40 mg tablet, Take 40 mg by mouth 2 (two) times a day, Disp: , Rfl:     glimepiride (AMARYL) 4 mg tablet, Take 4 mg by mouth every morning before breakfast 2 tabs=8mg, Disp: , Rfl:     glucose blood (ONE TOUCH ULTRA TEST) test strip, 1 each by Other route 2 (two) times a day, Disp: 100 each, Rfl: 3    hydrOXYzine (ATARAX) 10 mg/5 mL syrup, Take 10 mg by mouth 3 (three) times a day, Disp: , Rfl:     Insulin Degludec-Liraglutide 100-3 6 UNIT-MG/ML SOPN, Inject 30 Units under the skin daily, Disp: 10 pen, Rfl: 3    metFORMIN (GLUCOPHAGE) 500 mg tablet, Take 500 mg by mouth 2 (two) times a day with meals [id number] /twice a day, Disp: , Rfl:     metoprolol tartrate (LOPRESSOR) 100 mg tablet, TAKE 1 TABLET EVERY DAY, Disp: 90 tablet, Rfl: 0    Multiple Vitamin (MULTIVITAMIN) tablet, Take 1 tablet by mouth daily, Disp: , Rfl:     omega-3-acid ethyl esters (LOVAZA) 1 g capsule, Take 2 capsules (2 g total) by mouth 2 (two) times a day, Disp: 360 capsule, Rfl: 3    saxagliptin (ONGLYZA) 5 MG tablet, Take 1 tablet (5 mg total) by mouth daily, Disp: 30 tablet, Rfl: 5    simvastatin (ZOCOR) 40 mg tablet, TAKE 1 TABLET DAILY, Disp: 90 tablet, Rfl: 1    chlorthalidone 25 mg tablet, Take 1 tablet (25 mg total) by mouth daily, Disp: 90 tablet, Rfl: 3    losartan (COZAAR) 100 MG tablet, Take 1 tablet (100 mg total) by mouth daily, Disp: 90 tablet, Rfl: 3    Past Medical History:   Diagnosis Date    Arthritis     OA    Bruise of both arms     forearms and both hands    Bruises easily     Diabetes mellitus (HCC)     Eczema     Gout     Hyperlipidemia     Hypertension     Seasonal allergies     Toe infection     bilat great toes    Walks frequently     Wears dentures     upper    Wears glasses      Past Surgical History:   Procedure Laterality Date    CARPAL TUNNEL RELEASE Left     CARPAL TUNNEL RELEASE Right     KNEE ARTHROSCOPY Left     KNEE ARTHROSCOPY Right     WV AMPUTATION TOE,I-P JT Bilateral 5/2/2017    Procedure: PARTIAL AMPUTATION RIGHT AND LEFT HALLUX ;  Surgeon: Meggan Ernandez DPM;  Location: [facility];  Service: Podiatry    WV AMPUTATION TOE,MT-P JT Left 7/25/2017    Procedure: 2ND TOE AMPUTATION;  Surgeon: Meggan Ernandez DPM;  Location: [facility];  Service: Podiatry    SHOULDER ARTHROSCOPY Left     with screws,RTC    VASECTOMY       Family History   Problem Relation Age of Onset    No Known Problems Mother     Hypertension Father       reports that he quit smoking about 8 years ago  His smoking use included Cigarettes  He has a 5 00 pack-year smoking history  He has never used smokeless tobacco  He reports that he does not drink alcohol or use drugs        Physical Exam:   Vitals:    05/31/18 1025   BP: 170/56   BP Location: Right arm   Patient Position: Sitting   Cuff Size: Standard Weight: 105 kg (231 lb)   Height: 5' 9" (1 753 m)     Body mass index is 34 11 kg/m²  General: conscious, cooperative, in not acute distress  Eyes: conjunctivae pink, anicteric sclerae  ENT: lips and mucous membranes moist  Neck: supple, no JVD  Chest: clear breath sounds bilateral, no crackles, ronchus or wheezings  CVS: distinct S1 & S2, normal rate, regular rhythm  Abdomen: soft, non-tender, non-distended, normoactive bowel sounds  Extremities:   2+ pitting edema  Skin: no rash  Neuro: awake, alert, oriented      Lab Results:     Results for orders placed or performed in visit on 03/15/18   Hemoglobin A1c   Result Value Ref Range    Hemoglobin A1C 6 9 (H) 4 2 - 6 3 %     mg/dl   Lipid panel   Result Value Ref Range    Cholesterol 130 50 - 200 mg/dL    Triglycerides 197 (H) <=150 mg/dL    HDL, Direct 37 (L) 40 - 60 mg/dL    LDL Calculated 54 0 - 100 mg/dL   Comprehensive metabolic panel   Result Value Ref Range    Sodium 136 136 - 145 mmol/L    Potassium 4 2 3 5 - 5 3 mmol/L    Chloride 102 100 - 108 mmol/L    CO2 28 21 - 32 mmol/L    Anion Gap 6 4 - 13 mmol/L    BUN 19 5 - 25 mg/dL    Creatinine 0 94 0 60 - 1 30 mg/dL    Glucose, Fasting 147 (H) 65 - 99 mg/dL    Calcium 9 3 8 3 - 10 1 mg/dL    AST 31 5 - 45 U/L    ALT 37 12 - 78 U/L    Alkaline Phosphatase 66 46 - 116 U/L    Total Protein 7 1 6 4 - 8 2 g/dL    Albumin 3 2 (L) 3 5 - 5 0 g/dL    Total Bilirubin 0 71 0 20 - 1 00 mg/dL    eGFR 85 ml/min/1 73sq m   Microalbumin / creatinine urine ratio   Result Value Ref Range    Creatinine, Ur 145 0 mg/dL    Microalbum  ,U,Random 5,130 0 (H) 0 0 - 20 0 mg/L    Microalb Creat Ratio 3,538 (H) 0 - 30 mg/g creatinine       Echocardiogram 8/9/15  - left ventricle had an EF of 60% wall thickness was mildly increased with mild concentric hypertrophy and an abnormal left ventricular relaxation  - left atrium was mildly to moderately dilated  - right atrium was mildly dilated  - mitral valve shows mild annular calcification    Portions of the record may have been created with voice recognition software  Occasional wrong word or "sound a like" substitutions may have occurred due to the inherent limitations of voice recognition software  Read the chart carefully and recognize, using context, where substitutions have occurred  If you have any questions, please contact the dictating provider

## 2018-06-05 ENCOUNTER — TELEPHONE (OUTPATIENT)
Dept: FAMILY MEDICINE CLINIC | Facility: CLINIC | Age: 63
End: 2018-06-05

## 2018-06-18 ENCOUNTER — TRANSCRIBE ORDERS (OUTPATIENT)
Dept: LAB | Facility: CLINIC | Age: 63
End: 2018-06-18

## 2018-06-18 ENCOUNTER — APPOINTMENT (OUTPATIENT)
Dept: LAB | Facility: CLINIC | Age: 63
End: 2018-06-18
Payer: COMMERCIAL

## 2018-06-18 DIAGNOSIS — R80.1 PERSISTENT PROTEINURIA: ICD-10-CM

## 2018-06-18 LAB
ALBUMIN SERPL BCP-MCNC: 3.2 G/DL (ref 3.5–5)
ALP SERPL-CCNC: 71 U/L (ref 46–116)
ALT SERPL W P-5'-P-CCNC: 38 U/L (ref 12–78)
ANION GAP SERPL CALCULATED.3IONS-SCNC: 9 MMOL/L (ref 4–13)
AST SERPL W P-5'-P-CCNC: 31 U/L (ref 5–45)
BACTERIA UR QL AUTO: ABNORMAL /HPF
BASOPHILS # BLD AUTO: 0.06 THOUSANDS/ΜL (ref 0–0.1)
BASOPHILS NFR BLD AUTO: 1 % (ref 0–1)
BILIRUB SERPL-MCNC: 0.64 MG/DL (ref 0.2–1)
BILIRUB UR QL STRIP: NEGATIVE
BUN SERPL-MCNC: 25 MG/DL (ref 5–25)
C3 SERPL-MCNC: 144 MG/DL (ref 90–180)
C4 SERPL-MCNC: 23 MG/DL (ref 10–40)
CALCIUM SERPL-MCNC: 9.2 MG/DL (ref 8.3–10.1)
CHLORIDE SERPL-SCNC: 105 MMOL/L (ref 100–108)
CK SERPL-CCNC: 123 U/L (ref 39–308)
CLARITY UR: CLEAR
CO2 SERPL-SCNC: 23 MMOL/L (ref 21–32)
COLOR UR: YELLOW
CREAT SERPL-MCNC: 0.98 MG/DL (ref 0.6–1.3)
CREAT UR-MCNC: 51.7 MG/DL
EOSINOPHIL # BLD AUTO: 0.52 THOUSAND/ΜL (ref 0–0.61)
EOSINOPHIL NFR BLD AUTO: 5 % (ref 0–6)
ERYTHROCYTE [DISTWIDTH] IN BLOOD BY AUTOMATED COUNT: 13.8 % (ref 11.6–15.1)
GFR SERPL CREATININE-BSD FRML MDRD: 81 ML/MIN/1.73SQ M
GLUCOSE P FAST SERPL-MCNC: 125 MG/DL (ref 65–99)
GLUCOSE UR STRIP-MCNC: NEGATIVE MG/DL
HCT VFR BLD AUTO: 34.2 % (ref 36.5–49.3)
HGB BLD-MCNC: 11.4 G/DL (ref 12–17)
HGB UR QL STRIP.AUTO: ABNORMAL
HYALINE CASTS #/AREA URNS LPF: ABNORMAL /LPF
IMM GRANULOCYTES # BLD AUTO: 0.04 THOUSAND/UL (ref 0–0.2)
IMM GRANULOCYTES NFR BLD AUTO: 0 % (ref 0–2)
KETONES UR STRIP-MCNC: NEGATIVE MG/DL
LEUKOCYTE ESTERASE UR QL STRIP: NEGATIVE
LYMPHOCYTES # BLD AUTO: 1.81 THOUSANDS/ΜL (ref 0.6–4.47)
LYMPHOCYTES NFR BLD AUTO: 17 % (ref 14–44)
MCH RBC QN AUTO: 30.4 PG (ref 26.8–34.3)
MCHC RBC AUTO-ENTMCNC: 33.3 G/DL (ref 31.4–37.4)
MCV RBC AUTO: 91 FL (ref 82–98)
MONOCYTES # BLD AUTO: 0.88 THOUSAND/ΜL (ref 0.17–1.22)
MONOCYTES NFR BLD AUTO: 8 % (ref 4–12)
NEUTROPHILS # BLD AUTO: 7.53 THOUSANDS/ΜL (ref 1.85–7.62)
NEUTS SEG NFR BLD AUTO: 69 % (ref 43–75)
NITRITE UR QL STRIP: NEGATIVE
NON-SQ EPI CELLS URNS QL MICRO: ABNORMAL /HPF
NRBC BLD AUTO-RTO: 0 /100 WBCS
PH UR STRIP.AUTO: 6.5 [PH] (ref 4.5–8)
PLATELET # BLD AUTO: 303 THOUSANDS/UL (ref 149–390)
PMV BLD AUTO: 10.4 FL (ref 8.9–12.7)
POTASSIUM SERPL-SCNC: 4.4 MMOL/L (ref 3.5–5.3)
PROT SERPL-MCNC: 7 G/DL (ref 6.4–8.2)
PROT UR STRIP-MCNC: ABNORMAL MG/DL
PROT UR-MCNC: 218 MG/DL
PROT/CREAT UR: 4.22 MG/G{CREAT} (ref 0–0.1)
RBC # BLD AUTO: 3.75 MILLION/UL (ref 3.88–5.62)
RBC #/AREA URNS AUTO: ABNORMAL /HPF
SODIUM SERPL-SCNC: 137 MMOL/L (ref 136–145)
SP GR UR STRIP.AUTO: 1.01 (ref 1–1.03)
UROBILINOGEN UR QL STRIP.AUTO: 0.2 E.U./DL
WBC # BLD AUTO: 10.84 THOUSAND/UL (ref 4.31–10.16)
WBC #/AREA URNS AUTO: ABNORMAL /HPF

## 2018-06-18 PROCEDURE — 84165 PROTEIN E-PHORESIS SERUM: CPT | Performed by: PATHOLOGY

## 2018-06-18 PROCEDURE — 36415 COLL VENOUS BLD VENIPUNCTURE: CPT | Performed by: INTERNAL MEDICINE

## 2018-06-18 PROCEDURE — 81001 URINALYSIS AUTO W/SCOPE: CPT | Performed by: INTERNAL MEDICINE

## 2018-06-18 PROCEDURE — 84166 PROTEIN E-PHORESIS/URINE/CSF: CPT | Performed by: PATHOLOGY

## 2018-06-18 PROCEDURE — 86160 COMPLEMENT ANTIGEN: CPT

## 2018-06-18 PROCEDURE — 82570 ASSAY OF URINE CREATININE: CPT | Performed by: INTERNAL MEDICINE

## 2018-06-18 PROCEDURE — 85025 COMPLETE CBC W/AUTO DIFF WBC: CPT | Performed by: INTERNAL MEDICINE

## 2018-06-18 PROCEDURE — 86225 DNA ANTIBODY NATIVE: CPT | Performed by: INTERNAL MEDICINE

## 2018-06-18 PROCEDURE — 80053 COMPREHEN METABOLIC PANEL: CPT | Performed by: INTERNAL MEDICINE

## 2018-06-18 PROCEDURE — 84156 ASSAY OF PROTEIN URINE: CPT | Performed by: INTERNAL MEDICINE

## 2018-06-18 PROCEDURE — 86038 ANTINUCLEAR ANTIBODIES: CPT | Performed by: INTERNAL MEDICINE

## 2018-06-18 PROCEDURE — 82550 ASSAY OF CK (CPK): CPT | Performed by: INTERNAL MEDICINE

## 2018-06-18 PROCEDURE — 84166 PROTEIN E-PHORESIS/URINE/CSF: CPT | Performed by: INTERNAL MEDICINE

## 2018-06-18 PROCEDURE — 84165 PROTEIN E-PHORESIS SERUM: CPT | Performed by: INTERNAL MEDICINE

## 2018-06-18 PROCEDURE — 83520 IMMUNOASSAY QUANT NOS NONAB: CPT | Performed by: INTERNAL MEDICINE

## 2018-06-18 PROCEDURE — 86255 FLUORESCENT ANTIBODY SCREEN: CPT | Performed by: INTERNAL MEDICINE

## 2018-06-19 ENCOUNTER — TELEPHONE (OUTPATIENT)
Dept: NEPHROLOGY | Facility: CLINIC | Age: 63
End: 2018-06-19

## 2018-06-19 LAB — DSDNA AB SER-ACNC: 1 IU/ML (ref 0–9)

## 2018-06-19 NOTE — TELEPHONE ENCOUNTER
I called and spoke with Stiven Camacho  He's aware of his upcr results  He stated that his BP has been ranging 130s/60s  He has no complaints regarding the changes that were made last visit  He is scheduled to see Dr Danielle Mathews on 7/13/18

## 2018-06-19 NOTE — TELEPHONE ENCOUNTER
----- Message from Bello Johnson, DO sent at 6/19/2018  3:00 PM EDT -----  Can you let him know I reviewed his lab work his urine protein to creatinine ratio is elevated at about 4 g and we will discuss at his next appointment options for treatment regarding this    Otherwise can you ask him how his blood pressures have been   with the changes we made at his last office appointment and let me know so that further adjustments can be made prior to his next appointment in July

## 2018-06-19 NOTE — PROGRESS NOTES
Can you let him know I reviewed his lab work his urine protein to creatinine ratio is elevated at about 4 g and we will discuss at his next appointment options for treatment regarding this    Otherwise can you ask him how his blood pressures have been with the changes we made at his last office appointment and let me know so that further adjustments can be made prior to his next appointment in July

## 2018-06-20 LAB
C-ANCA TITR SER IF: NORMAL TITER
GBM AB SER IA-ACNC: 4 UNITS (ref 0–20)
MYELOPEROXIDASE AB SER IA-ACNC: <9 U/ML (ref 0–9)
P-ANCA ATYPICAL TITR SER IF: NORMAL TITER
P-ANCA TITR SER IF: NORMAL TITER
PROTEINASE3 AB SER IA-ACNC: <3.5 U/ML (ref 0–3.5)
RYE IGE QN: NEGATIVE

## 2018-06-21 LAB
ALBUMIN SERPL ELPH-MCNC: 3.6 G/DL (ref 3.5–5)
ALBUMIN SERPL ELPH-MCNC: 53.7 % (ref 52–65)
ALBUMIN UR ELPH-MCNC: 86.2 %
ALPHA1 GLOB MFR UR ELPH: 3.7 %
ALPHA1 GLOB SERPL ELPH-MCNC: 0.29 G/DL (ref 0.1–0.4)
ALPHA1 GLOB SERPL ELPH-MCNC: 4.4 % (ref 2.5–5)
ALPHA2 GLOB MFR UR ELPH: 2.4 %
ALPHA2 GLOB SERPL ELPH-MCNC: 1.21 G/DL (ref 0.4–1.2)
ALPHA2 GLOB SERPL ELPH-MCNC: 18.1 % (ref 7–13)
B-GLOBULIN MFR UR ELPH: 5.3 %
BETA GLOB ABNORMAL SERPL ELPH-MCNC: 0.48 G/DL (ref 0.4–0.8)
BETA1 GLOB SERPL ELPH-MCNC: 7.2 % (ref 5–13)
BETA2 GLOB SERPL ELPH-MCNC: 6 % (ref 2–8)
BETA2+GAMMA GLOB SERPL ELPH-MCNC: 0.4 G/DL (ref 0.2–0.5)
GAMMA GLOB ABNORMAL SERPL ELPH-MCNC: 0.71 G/DL (ref 0.5–1.6)
GAMMA GLOB MFR UR ELPH: 2.4 %
GAMMA GLOB SERPL ELPH-MCNC: 10.6 % (ref 12–22)
IGG/ALB SER: 1.16 {RATIO} (ref 1.1–1.8)
PROT PATTERN SERPL ELPH-IMP: ABNORMAL
PROT PATTERN UR ELPH-IMP: ABNORMAL
PROT SERPL-MCNC: 6.7 G/DL (ref 6.4–8.2)
PROT UR-MCNC: 215 MG/DL

## 2018-06-28 ENCOUNTER — LAB (OUTPATIENT)
Dept: LAB | Facility: CLINIC | Age: 63
End: 2018-06-28
Payer: COMMERCIAL

## 2018-06-28 DIAGNOSIS — R80.1 PERSISTENT PROTEINURIA: ICD-10-CM

## 2018-06-28 DIAGNOSIS — E11.40 TYPE 2 DIABETES MELLITUS WITH DIABETIC NEUROPATHY (HCC): ICD-10-CM

## 2018-06-28 DIAGNOSIS — E78.2 MIXED HYPERLIPIDEMIA: ICD-10-CM

## 2018-06-28 LAB
ALBUMIN SERPL BCP-MCNC: 3.3 G/DL (ref 3.5–5)
ALP SERPL-CCNC: 77 U/L (ref 46–116)
ALT SERPL W P-5'-P-CCNC: 36 U/L (ref 12–78)
ANION GAP SERPL CALCULATED.3IONS-SCNC: 9 MMOL/L (ref 4–13)
AST SERPL W P-5'-P-CCNC: 25 U/L (ref 5–45)
BILIRUB SERPL-MCNC: 0.76 MG/DL (ref 0.2–1)
BUN SERPL-MCNC: 28 MG/DL (ref 5–25)
CALCIUM SERPL-MCNC: 9.4 MG/DL (ref 8.3–10.1)
CHLORIDE SERPL-SCNC: 101 MMOL/L (ref 100–108)
CHOLEST SERPL-MCNC: 109 MG/DL (ref 50–200)
CO2 SERPL-SCNC: 25 MMOL/L (ref 21–32)
CREAT SERPL-MCNC: 1.11 MG/DL (ref 0.6–1.3)
CREAT UR-MCNC: 93.1 MG/DL
EST. AVERAGE GLUCOSE BLD GHB EST-MCNC: 157 MG/DL
GFR SERPL CREATININE-BSD FRML MDRD: 70 ML/MIN/1.73SQ M
GLUCOSE P FAST SERPL-MCNC: 157 MG/DL (ref 65–99)
HBA1C MFR BLD: 7.1 % (ref 4.2–6.3)
HDLC SERPL-MCNC: 36 MG/DL (ref 40–60)
LDLC SERPL CALC-MCNC: 49 MG/DL (ref 0–100)
MICROALBUMIN UR-MCNC: 3760 MG/L (ref 0–20)
MICROALBUMIN/CREAT 24H UR: 4039 MG/G CREATININE (ref 0–30)
NONHDLC SERPL-MCNC: 73 MG/DL
POTASSIUM SERPL-SCNC: 4.5 MMOL/L (ref 3.5–5.3)
PROT SERPL-MCNC: 7.2 G/DL (ref 6.4–8.2)
SODIUM SERPL-SCNC: 135 MMOL/L (ref 136–145)
T4 FREE SERPL-MCNC: 0.93 NG/DL (ref 0.76–1.46)
TRIGL SERPL-MCNC: 120 MG/DL
TSH SERPL DL<=0.05 MIU/L-ACNC: 2.41 UIU/ML (ref 0.36–3.74)

## 2018-06-28 PROCEDURE — 82570 ASSAY OF URINE CREATININE: CPT

## 2018-06-28 PROCEDURE — 80053 COMPREHEN METABOLIC PANEL: CPT

## 2018-06-28 PROCEDURE — 36415 COLL VENOUS BLD VENIPUNCTURE: CPT

## 2018-06-28 PROCEDURE — 84443 ASSAY THYROID STIM HORMONE: CPT

## 2018-06-28 PROCEDURE — 80061 LIPID PANEL: CPT

## 2018-06-28 PROCEDURE — 83036 HEMOGLOBIN GLYCOSYLATED A1C: CPT

## 2018-06-28 PROCEDURE — 82043 UR ALBUMIN QUANTITATIVE: CPT

## 2018-06-28 PROCEDURE — 84439 ASSAY OF FREE THYROXINE: CPT

## 2018-06-29 ENCOUNTER — TELEPHONE (OUTPATIENT)
Dept: ENDOCRINOLOGY | Facility: CLINIC | Age: 63
End: 2018-06-29

## 2018-06-29 NOTE — PROGRESS NOTES
Please call the patient regarding his abnormal result  Hemoglobin A1c is very close to target  He has increased amount of protein in the urine  He follows with Nephrology  LDL is at target  Normal thyroid testing

## 2018-06-29 NOTE — TELEPHONE ENCOUNTER
Hemoglobin A1c is very close to target  [facility] has increased amount of protein in the urine   He follows with Nephrology   LDL is at target   Normal thyroid testing       Left this message for pt and to call if any further questions

## 2018-07-03 DIAGNOSIS — E11.9 TYPE 2 DIABETES MELLITUS WITHOUT COMPLICATION, UNSPECIFIED WHETHER LONG TERM INSULIN USE (HCC): Primary | ICD-10-CM

## 2018-07-03 RX ORDER — GLIMEPIRIDE 4 MG/1
4 TABLET ORAL
Qty: 60 TABLET | Refills: 3 | Status: SHIPPED | OUTPATIENT
Start: 2018-07-03 | End: 2018-07-10 | Stop reason: SDUPTHER

## 2018-07-03 NOTE — TELEPHONE ENCOUNTER
Pharmacy called for clarification of the sig on script for glimiperide  I spoke to Dr Aguilar Perla and confirmed it should be 1 tablet BID  I called pharmacy and gave them this information

## 2018-07-06 NOTE — TELEPHONE ENCOUNTER
Pts wife, Mary Patel called stating that script was sent to wrong pharmacy  It is supposed to go to [facility] in [place]   I called [facility] in [facility] and cancelled, then called script into [facility]

## 2018-07-09 DIAGNOSIS — E11.40 TYPE 2 DIABETES MELLITUS WITH DIABETIC NEUROPATHY, UNSPECIFIED WHETHER LONG TERM INSULIN USE (HCC): Primary | ICD-10-CM

## 2018-07-10 DIAGNOSIS — E11.9 TYPE 2 DIABETES MELLITUS WITHOUT COMPLICATION, UNSPECIFIED WHETHER LONG TERM INSULIN USE (HCC): ICD-10-CM

## 2018-07-10 RX ORDER — GLIMEPIRIDE 4 MG/1
TABLET ORAL
Qty: 180 TABLET | Refills: 1 | Status: SHIPPED | OUTPATIENT
Start: 2018-07-10 | End: 2018-10-29 | Stop reason: SDUPTHER

## 2018-07-12 ENCOUNTER — OFFICE VISIT (OUTPATIENT)
Dept: ENDOCRINOLOGY | Facility: CLINIC | Age: 63
End: 2018-07-12
Payer: COMMERCIAL

## 2018-07-12 VITALS
WEIGHT: 231 LBS | HEART RATE: 78 BPM | SYSTOLIC BLOOD PRESSURE: 162 MMHG | DIASTOLIC BLOOD PRESSURE: 78 MMHG | BODY MASS INDEX: 33.07 KG/M2 | HEIGHT: 70 IN

## 2018-07-12 DIAGNOSIS — I10 ESSENTIAL HYPERTENSION: ICD-10-CM

## 2018-07-12 DIAGNOSIS — E78.2 MIXED HYPERLIPIDEMIA: ICD-10-CM

## 2018-07-12 DIAGNOSIS — E11.40 TYPE 2 DIABETES MELLITUS WITH DIABETIC NEUROPATHY, WITHOUT LONG-TERM CURRENT USE OF INSULIN (HCC): Primary | ICD-10-CM

## 2018-07-12 PROCEDURE — 99213 OFFICE O/P EST LOW 20 MIN: CPT | Performed by: NURSE PRACTITIONER

## 2018-07-12 NOTE — ASSESSMENT & PLAN NOTE
A1C almost at goal, he did not bring BG log or meter today  Instructed to check BG 2x per day at alternating time of day and send readings into the office in two weeks  Continue current regimen for now  Educated on diet, exercise, and weight loss  Check A1C, cmp, and microalbumin

## 2018-07-12 NOTE — PROGRESS NOTES
Established Patient Progress Note      Chief Complaint   Patient presents with    Diabetes Type 2          History of Present Illness:   Isabelle Arredondo is a 57 y o  male with a history of HTN, HLD type 2 diabetes without long term use of insulin  Reports complications of neuropathy  Last A1C 7 1  He did not bring meter or BG log to today's visit  Denies recent illness or hospitalizations  Denies recent severe hypoglycemic or severe hyperglycemic episodes  Denies any issues with his current regimen  Home glucose monitoring: are performed regularly    Home blood glucose readings:   Before breakfast: 160s  Before lunch: does not check  Before dinner: 230s  Bedtime: does not check     Current regimen: Xultophy 30 units, Glimepiride 4 mg BID, Tradjenta 5 mg, Metformin 1,000 mg BID  compliant all of the timedenies any side effects from current medications     Hypoglycemic episodes: No never   H/o of hypoglycemia causing hospitalization or Intervention such as glucagon injection or ambulance call No     Last Eye Exam: retinopathy, no retinopathy  Last Foot Exam: every 6 weeks     Has hypertension: Taking Amlodipine, Losartan, and Metoprolol   Has hyperlipidemia: Taking Simvastatin and Omega-3      Patient Active Problem List   Diagnosis    Bilateral leg edema    Diabetes mellitus with neuropathy [facility]    Diabetic foot ulcer ([facility]    DM type 2, not at goal [facility])    Easy bruising    Eczema    Erectile dysfunction of non-organic origin    Gout    Hyperlipidemia    Hypertension    Nephropathy    Osteoarthritis    Peripheral arterial disease ([facility]    PVCs (premature ventricular contractions)    Rhinitis    Systolic murmur    Vertigo      Past Medical History:   Diagnosis Date    Arthritis     OA    Bruise of both arms     forearms and both hands    Bruises easily     Diabetes mellitus ([facility] [facility]     Eczema     Gout     Hyperlipidemia     Hypertension     Seasonal allergies     Toe infection     bilat great toes    Walks frequently     Wears dentures     upper    Wears glasses       Past Surgical History:   Procedure Laterality Date    CARPAL TUNNEL RELEASE Left     CARPAL TUNNEL RELEASE Right     KNEE ARTHROSCOPY Left     KNEE ARTHROSCOPY Right     NV AMPUTATION TOE,I-P JT Bilateral 5/2/2017    Procedure: PARTIAL AMPUTATION RIGHT AND LEFT HALLUX ;  Surgeon: Erica Gruber DPM;  [facility];  Service: Podiatry    NV AMPUTATION TOE,MT-P JT Left 7/25/2017    Procedure: 2ND TOE AMPUTATION;  Surgeon: Erica Gruber DPM;  [facility];  Service: Podiatry    SHOULDER ARTHROSCOPY Left     with screws,RTC    VASECTOMY        Family History   Problem Relation Age of Onset    No Known Problems Mother     Hypertension Father      Social History   Substance Use Topics    Smoking status: Former Smoker     Packs/day: 0 25     Years: 20 00     Types: Cigarettes     Quit date: 2010    Smokeless tobacco: Never Used      Comment: quit 7 years ago    Alcohol use No     Allergies   Allergen Reactions    Lamisil [Terbinafine] Rash    Other Swelling     Pomegranate - facial swelling, no swelling of tongue, esophagus  Adhesive tape   Latex Rash         Current Outpatient Prescriptions:     allopurinol (ZYLOPRIM) 300 mg tablet, TAKE 1 TABLET DAILY  , Disp: 90 tablet, Rfl: 1    amLODIPine (NORVASC) 10 mg tablet, Take 1 tablet (10 mg total) by mouth daily, Disp: 90 tablet, Rfl: 3    aspirin 81 MG tablet, Take 1 tablet by mouth daily, Disp: , Rfl:     chlorthalidone 25 mg tablet, Take 1 tablet (25 mg total) by mouth daily, Disp: 90 tablet, Rfl: 3    Cholecalciferol (VITAMIN D-3) 1000 units CAPS, Take 1 capsule by mouth every morning  , Disp: , Rfl:     Dupilumab (DUPIXENT SC), Inject under the skin every 14 (fourteen) days, Disp: , Rfl:     furosemide (LASIX) 40 mg tablet, Take 40 mg by mouth 2 (two) times a day, Disp: , Rfl:     glimepiride (AMARYL) 4 mg tablet, TAKE 1 TABLET TWICE A DAY, Disp: 180 tablet, Rfl: 1    glucose blood (ONE TOUCH ULTRA TEST) test strip, 1 each by Other route 2 (two) times a day, Disp: 100 each, Rfl: 3    hydrOXYzine (ATARAX) 10 mg/5 mL syrup, Take 10 mg by mouth 3 (three) times a day, Disp: , Rfl:     Insulin Degludec-Liraglutide (Insulin Degludec-Liraglutide) 100 units-3 6 mg/mL injection pen, Inject 30 Units under the skin daily, Disp: 18 pen, Rfl: 3    Linagliptin 5 MG TABS, Take 5 mg by mouth daily, Disp: 90 mg, Rfl: 3    losartan (COZAAR) 100 MG tablet, Take 1 tablet (100 mg total) by mouth daily, Disp: 90 tablet, Rfl: 3    metFORMIN (GLUCOPHAGE) 500 mg tablet, Take 500 mg by mouth 2 (two) times a day with meals [id number] /twice a day, Disp: , Rfl:     metoprolol tartrate (LOPRESSOR) 100 mg tablet, TAKE 1 TABLET EVERY DAY, Disp: 90 tablet, Rfl: 0    Multiple Vitamin (MULTIVITAMIN) tablet, Take 1 tablet by mouth daily, Disp: , Rfl:     omega-3-acid ethyl esters (LOVAZA) 1 g capsule, Take 2 capsules (2 g total) by mouth 2 (two) times a day, Disp: 360 capsule, Rfl: 3    simvastatin (ZOCOR) 40 mg tablet, TAKE 1 TABLET DAILY, Disp: 90 tablet, Rfl: 1    Review of Systems   Constitutional: Negative for activity change, appetite change and fatigue  HENT: Negative for sore throat, trouble swallowing and voice change  Eyes: Negative for visual disturbance  Respiratory: Negative for choking, chest tightness and shortness of breath  Cardiovascular: Negative for chest pain, palpitations and leg swelling  Gastrointestinal: Negative for abdominal pain, constipation and diarrhea  Endocrine: Negative for cold intolerance, heat intolerance, polydipsia, polyphagia and polyuria  Genitourinary: Negative for frequency  Musculoskeletal: Negative for arthralgias and myalgias  Skin: Negative for rash  Neurological: Negative for dizziness and syncope  Hematological: Negative for adenopathy  Psychiatric/Behavioral: Negative for sleep disturbance     All other systems reviewed and are negative  Physical Exam:  Body mass index is 33 15 kg/m²  /78   Pulse 78   Ht 5' 10" (1 778 m)   Wt 105 kg (231 lb)   BMI 33 15 kg/m²    Wt Readings from Last 3 Encounters:   07/12/18 105 kg (231 lb)   05/31/18 105 kg (231 lb)   04/04/18 101 kg (223 lb)       Physical Exam   Constitutional: He is oriented to person, place, and time  He appears well-developed and well-nourished  No distress  HENT:   Head: Normocephalic and atraumatic  Mouth/Throat: Oropharynx is clear and moist    Eyes: Conjunctivae and EOM are normal  Pupils are equal, round, and reactive to light  Neck: Normal range of motion  Neck supple  No thyromegaly present  Cardiovascular: Normal rate, regular rhythm and normal heart sounds  No murmur heard  Pulmonary/Chest: Effort normal and breath sounds normal  No respiratory distress  He has no wheezes  He has no rales  Abdominal: Soft  Bowel sounds are normal  He exhibits no distension  There is no tenderness  Musculoskeletal: Normal range of motion  He exhibits no edema  Lymphadenopathy:     He has no cervical adenopathy  Neurological: He is alert and oriented to person, place, and time  Skin: Skin is warm and dry  Psychiatric: He has a normal mood and affect  Vitals reviewed      Diabetic Foot Exam    Labs:     Lab Results   Component Value Date    HGBA1C 7 1 (H) 06/28/2018       Lab Results   Component Value Date     (L) 06/28/2018    K 4 5 06/28/2018     06/28/2018    CO2 25 06/28/2018    ANIONGAP 9 06/28/2018    BUN 28 (H) 06/28/2018    CREATININE 1 11 06/28/2018    GLUCOSE 147 (H) 08/16/2017    GLUF 157 (H) 06/28/2018    CALCIUM 9 4 06/28/2018    CORRECTEDCA 10 4 (H) 07/07/2017    AST 25 06/28/2018    ALT 36 06/28/2018    ALKPHOS 77 06/28/2018    PROT 7 2 06/28/2018    BILITOT 0 76 06/28/2018    EGFR 70 06/28/2018         Lab Results   Component Value Date    CHOL 109 06/28/2018    HDL 36 (L) 06/28/2018    TRIG 120 06/28/2018 Lab Results   Component Value Date    HFM0RJFXGFSS 2 410 06/28/2018    FKM7KPOOSXOX 1 890 07/07/2017    QPD2TGOGLQJU 1 880 07/21/2016     Lab Results   Component Value Date    FREET4 0 93 06/28/2018       Impression & Plan:    Problem List Items Addressed This Visit     Diabetes mellitus with neuropathy ([facility] [address] ) - Primary     A1C almost at goal, he did not bring BG log or meter today  Instructed to check BG 2x per day at alternating time of day and send readings into the office in two weeks  Continue current regimen for now  Educated on diet, exercise, and weight loss  Check A1C, cmp, and microalbumin  Relevant Medications    Insulin Degludec-Liraglutide (Insulin Degludec-Liraglutide) 100 units-3 6 mg/mL injection pen    Other Relevant Orders    Hemoglobin A1C    Comprehensive metabolic panel    Microalbumin / creatinine urine ratio    Hyperlipidemia     Stable, continue statin and omega-3         Relevant Orders    Lipid Panel with Direct LDL reflex    Hypertension     BP elevated, will continue to monitor, continue current regimen                Orders Placed This Encounter   Procedures    Hemoglobin A1C     Standing Status:   Future     Standing Expiration Date:   7/12/2019    Comprehensive metabolic panel     This is a patient instruction: Patient fasting for 8 hours or longer recommended  Standing Status:   Future     Standing Expiration Date:   7/12/2019    Lipid Panel with Direct LDL reflex     This is a patient instruction: This test requires patient fasting for 10-12 hours or longer  Drinking of black coffee or black tea is acceptable  Standing Status:   Future     Standing Expiration Date:   7/12/2019    Microalbumin / creatinine urine ratio     Standing Status:   Future     Standing Expiration Date:   7/12/2019       Discussed with the patient and all questioned fully answered  He will call me if any problems arise  Follow-up appointment in 3 months       Counseled patient on diagnostic results, prognosis, risk and benefit of treatment options, instruction for management, importance of treatment compliance, Risk  factor reduction and impressions      PAT [facility]

## 2018-07-13 ENCOUNTER — OFFICE VISIT (OUTPATIENT)
Dept: NEPHROLOGY | Facility: CLINIC | Age: 63
End: 2018-07-13
Payer: COMMERCIAL

## 2018-07-13 VITALS
BODY MASS INDEX: 33.47 KG/M2 | WEIGHT: 233.8 LBS | DIASTOLIC BLOOD PRESSURE: 62 MMHG | HEART RATE: 70 BPM | HEIGHT: 70 IN | SYSTOLIC BLOOD PRESSURE: 150 MMHG

## 2018-07-13 DIAGNOSIS — N28.9 NEPHROPATHY: Primary | ICD-10-CM

## 2018-07-13 DIAGNOSIS — R80.1 PERSISTENT PROTEINURIA: ICD-10-CM

## 2018-07-13 DIAGNOSIS — M1A.9XX1 CHRONIC GOUT WITH TOPHUS, UNSPECIFIED CAUSE, UNSPECIFIED SITE: ICD-10-CM

## 2018-07-13 DIAGNOSIS — I10 ESSENTIAL HYPERTENSION: ICD-10-CM

## 2018-07-13 DIAGNOSIS — R60.0 BILATERAL LEG EDEMA: ICD-10-CM

## 2018-07-13 PROCEDURE — 99214 OFFICE O/P EST MOD 30 MIN: CPT | Performed by: INTERNAL MEDICINE

## 2018-07-13 RX ORDER — SPIRONOLACTONE 25 MG/1
25 TABLET ORAL DAILY
Qty: 90 TABLET | Refills: 3 | Status: SHIPPED | OUTPATIENT
Start: 2018-07-13 | End: 2018-10-22

## 2018-07-13 NOTE — LETTER
July 13, 2018     Christian Teague 48    Patient: Davida Mendez   YOB: 1955   Date of Visit: 7/13/2018       Dear Dr Trish Dunne: Thank you for referring Davida Mendez to me for evaluation  Below are my notes for this consultation  If you have questions, please do not hesitate to call me  I look forward to following your patient along with you           Sincerely,        Susan Linares DO        CC: MD Susan Mosley DO  7/13/2018 12:35 PM  Sign at close encounter  OFFICE follow-up- Nephrology   Davida Mendez 57 y o  male MRN: [id number]    Encounter: [id number]          ASSESSMENT and PLAN:  Gayatri Barnard was seen today for an initial consultation    He is 57years old with a past medical history of type 2 diabetes mellitus times 20 years, hypertension that is uncontrolled, gout with no recent attack, hyperlipidemia who presents for evaluation of proteinuria    Diagnoses and all orders for this visit:    Essential (primary) hypertension  - blood pressures currently are uncontrolled with average blood pressures at home around 485-324 systolic so has improved  - given his age and comorbidities his goal blood pressure should be 120 to 130 over 70-80  - he is currently on amlodipine 10 mg daily, metoprolol 50 mg twice daily, losartan 100 mg daily, and chlorthalidone 25 mg daily which she is tolerating well  -given that his blood pressures are still above goal will add spironolactone 25 mg daily  - will repeat  Labs in 1 week to include electrolytes to make sure these are stable  -I have asked him to again continue to check his blood pressures at home and send me a note through my chart or through the office and let me know what his average blood pressures are  -he did bring his blood pressure cuff and and this correlates with our blood pressure his home blood pressures were reviewed on his blood pressure device  - his echocardiogram was reviewed as well he should have further follow-up with cardiology to monitor his valvular heart disease- he does have LVH so blood pressure control is very important and this was discussed in detail with him at his last office appointment    Persistent proteinuria  - in the setting of prolonged diabetes  -he does have nephrotic range proteinuria serologic workup is negative and his creatinine is stable  -we will continue to monitor with ARB use will start spironolactone as well which will likely help    Nephropathy  -serologies negative will monitor at this point    Gout  - on allopurinol 300 mg daily  - has been gout free, will monitor and continue    Bilateral leg edema  - left greater than right,  - He does take furosemide as needed however have asked him to adhere to a low-sodium diet and try compression stockings or Ace wrap, he denies any pain any warmth in the area on his right leg    DISCUSSION:    Patient Instructions     Patient Instructions   RATNA García here today for follow-up of the protein in your urine and the high blood pressure  The protein in the urine is likely related to your diabetes  We did an extensive workup which included blood tests and urine studies which were all negative  Fortunately your kidney number is stable  We will continue diabetic control as you are doing, continue the losartan at its current dose which helps with the protein in the urine  4 year blood pressure the year average blood pressure before was around 145 on the top number  Now it is closer to 135-140 on the topat home  I commended on checking your blood pressures at home these are  More accurate than your office blood pressure  Year goal blood pressure should be around 120/80  Continue urine metoprolol at its current dose, the amlodipine 10 mg daily, losartan 100 mg daily, chlorthalidone 25 mg daily, we will add spironolactone 25 mg daily as this will help your blood pressure as well as help the protein in the urine    Please check a repeat basic metabolic profile which is blood work along with some iron studies in about 1 week after you start the new medicine  Please call me and about 2 weeks to 1 month or send me a message on my CHART to let me know how your blood pressures are doing at home  If everything is stable I will see you back in about 3 months  Please call me with any questions or concerns prior to that  Especially if you are having problems with the medicines  follow-up in 6 months    HPI:  Sarah Gurrola is a 57 y o male who was referred by [facility] for evaluation of  Initial evaluation    He is 57years old and has a history of type 2 diabetes mellitus currently on oral medications and follows up with Endocrinology, it was noticed that he had a microalbumin to creatinine ratio that was elevated  To 3538, his hemoglobin A1c did improve    Since his last office visit his blood pressures have improved with the addition of chlorthalidone instead of hydrochlorothiazide although still not at goal his electrolytes remained stable his urine output is okay he denies any fevers or chills nausea vomiting diarrhea or constipation his blood pressures are elevated in the office but his home blood pressures were reviewed and are much better controlled    He currently denies any fevers or chills nausea vomiting diarrhea or constipation has no for bloody urine does complain of foamy urine      I personally spent over half of a total 60 minutes face to face with the patient in counseling and discussion and/or coordination of care as described above  ROS: All the systems were reviewed and were negative except as documented on the HPI  Allergies: Lamisil [terbinafine]; Other; and Latex    Medications:   Current Outpatient Prescriptions:     allopurinol (ZYLOPRIM) 300 mg tablet, TAKE 1 TABLET DAILY  , Disp: 90 tablet, Rfl: 1    amLODIPine (NORVASC) 10 mg tablet, Take 1 tablet (10 mg total) by mouth daily, Disp: 90 tablet, Rfl: 3    aspirin 81 MG tablet, Take 1 tablet by mouth daily, Disp: , Rfl:     chlorthalidone 25 mg tablet, Take 1 tablet (25 mg total) by mouth daily, Disp: 90 tablet, Rfl: 3    Cholecalciferol (VITAMIN D-3) 1000 units CAPS, Take 1 capsule by mouth every morning  , Disp: , Rfl:     Dupilumab (DUPIXENT SC), Inject under the skin every 14 (fourteen) days, Disp: , Rfl:     furosemide (LASIX) 40 mg tablet, Take 40 mg by mouth 2 (two) times a day as needed , Disp: , Rfl:     glimepiride (AMARYL) 4 mg tablet, TAKE 1 TABLET TWICE A DAY, Disp: 180 tablet, Rfl: 1    glucose blood (ONE TOUCH ULTRA TEST) test strip, 1 each by Other route 2 (two) times a day, Disp: 100 each, Rfl: 3    hydrOXYzine (ATARAX) 10 mg/5 mL syrup, Take 10 mg by mouth 3 (three) times a day, Disp: , Rfl:     Insulin Degludec-Liraglutide (Insulin Degludec-Liraglutide) 100 units-3 6 mg/mL injection pen, Inject 30 Units under the skin daily, Disp: 18 pen, Rfl: 3    Linagliptin 5 MG TABS, Take 5 mg by mouth daily, Disp: 90 mg, Rfl: 3    losartan (COZAAR) 100 MG tablet, Take 1 tablet (100 mg total) by mouth daily, Disp: 90 tablet, Rfl: 3    metFORMIN (GLUCOPHAGE) 500 mg tablet, Take 500 mg by mouth 2 (two) times a day with meals [id number] /twice a day, Disp: , Rfl:     metoprolol tartrate (LOPRESSOR) 100 mg tablet, TAKE 1 TABLET EVERY DAY, Disp: 90 tablet, Rfl: 0    Multiple Vitamin (MULTIVITAMIN) tablet, Take 1 tablet by mouth daily, Disp: , Rfl:     omega-3-acid ethyl esters (LOVAZA) 1 g capsule, Take 2 capsules (2 g total) by mouth 2 (two) times a day, Disp: 360 capsule, Rfl: 3    simvastatin (ZOCOR) 40 mg tablet, TAKE 1 TABLET DAILY, Disp: 90 tablet, Rfl: 1    spironolactone (ALDACTONE) 25 mg tablet, Take 1 tablet (25 mg total) by mouth daily for 360 doses, Disp: 90 tablet, Rfl: 3    Past Medical History:   Diagnosis Date    Arthritis     OA    Bruise of both arms     forearms and both hands    Bruises easily     [facility]     Eczema     Gout     Hyperlipidemia     Hypertension     Seasonal allergies     Toe infection     bilat great toes    Walks frequently     Wears dentures     upper    Wears glasses      Past Surgical History:   Procedure Laterality Date    CARPAL TUNNEL RELEASE Left     CARPAL TUNNEL RELEASE Right     KNEE ARTHROSCOPY Left     KNEE ARTHROSCOPY Right     MT AMPUTATION TOE,I-P JT Bilateral 5/2/2017    Procedure: PARTIAL AMPUTATION RIGHT AND LEFT HALLUX ;  Surgeon: Logan Rodriguez DPM;  [facility];  Service: Podiatry    MT AMPUTATION TOE,MT-P JT Left 7/25/2017    Procedure: 2ND TOE AMPUTATION;  Surgeon: Logan Rodriguez DPM;  [facility];  Service: Podiatry    SHOULDER ARTHROSCOPY Left     with screws,RTC    VASECTOMY       Family History   Problem Relation Age of Onset    No Known Problems Mother     Hypertension Father       reports that he quit smoking about 8 years ago  His smoking use included Cigarettes  He has a 5 00 pack-year smoking history  He has never used smokeless tobacco  He reports that he does not drink alcohol or use drugs  Physical Exam:   Vitals:    07/13/18 0938 07/13/18 0949 07/13/18 0950 07/13/18 0951   BP: 168/76 (!) 189/71 150/72 150/62   BP Location: Left arm      Patient Position: Sitting      Cuff Size: Large      Pulse: 70      Weight: 106 kg (233 lb 12 8 oz)      Height: 5' 10" (1 778 m)        Body mass index is 33 55 kg/m²      General: conscious, cooperative, in not acute distress  Eyes: conjunctivae pink, anicteric sclerae  ENT: lips and mucous membranes moist  Neck: supple, no JVD  Chest: clear breath sounds bilateral, no crackles, ronchus or wheezings  CVS: distinct S1 & S2, normal rate, regular rhythm  Abdomen: soft, non-tender, non-distended, normoactive bowel sounds  Extremities:   2+ pitting edema  Skin: no rash  Neuro: awake, alert, oriented      Lab Results:     Results for orders placed or performed in visit on 06/28/18   Comprehensive metabolic panel Lab Collect   Result Value Ref Range    Sodium 135 (L) 136 - 145 mmol/L    Potassium 4 5 3 5 - 5 3 mmol/L    Chloride 101 100 - 108 mmol/L    CO2 25 21 - 32 mmol/L    Anion Gap 9 4 - 13 mmol/L    BUN 28 (H) 5 - 25 mg/dL    Creatinine 1 11 0 60 - 1 30 mg/dL    Glucose, Fasting 157 (H) 65 - 99 mg/dL    Calcium 9 4 8 3 - 10 1 mg/dL    AST 25 5 - 45 U/L    ALT 36 12 - 78 U/L    Alkaline Phosphatase 77 46 - 116 U/L    Total Protein 7 2 6 4 - 8 2 g/dL    Albumin 3 3 (L) 3 5 - 5 0 g/dL    Total Bilirubin 0 76 0 20 - 1 00 mg/dL    eGFR 70 ml/min/1 73sq m   HEMOGLOBIN A1C W/ EAG ESTIMATION Lab Collect   Result Value Ref Range    Hemoglobin A1C 7 1 (H) 4 2 - 6 3 %     mg/dl   Lipid panel Lab Collect Lab Collect   Result Value Ref Range    Cholesterol 109 50 - 200 mg/dL    Triglycerides 120 <=150 mg/dL    HDL, Direct 36 (L) 40 - 60 mg/dL    LDL Calculated 49 0 - 100 mg/dL    Non-HDL-Chol (CHOL-HDL) 73 mg/dl   TSH, 3rd generation Lab Collect   Result Value Ref Range    TSH 3RD GENERATON 2 410 0 358 - 3 740 uIU/mL   T4, free Lab Collect   Result Value Ref Range    Free T4 0 93 0 76 - 1 46 ng/dL   Microalbumin / creatinine urine ratio Lab Collect   Result Value Ref Range    Creatinine, Ur 93 1 mg/dL    Microalbum  ,U,Random 3,760 0 (H) 0 0 - 20 0 mg/L    Microalb Creat Ratio 4,039 (H) 0 - 30 mg/g creatinine       Echocardiogram 8/9/15  - left ventricle had an EF of 60% wall thickness was mildly increased with mild concentric hypertrophy and an abnormal left ventricular relaxation  - left atrium was mildly to moderately dilated  - right atrium was mildly dilated  - mitral valve shows mild annular calcification    Portions of the record may have been created with voice recognition software  Occasional wrong word or "sound a like" substitutions may have occurred due to the inherent limitations of voice recognition software   Read the chart carefully and recognize, using context, where substitutions have occurred  If you have any questions, please contact the dictating provider

## 2018-07-13 NOTE — PROGRESS NOTES
OFFICE follow-up- Nephrology   Jena Hernández 57 y o  male MRN: [id number]    Encounter: [id number]          ASSESSMENT and PLAN:  Keyona Handy was seen today for an initial consultation    He is 57years old with a past medical history of type 2 diabetes mellitus times 20 years, hypertension that is uncontrolled, gout with no recent attack, hyperlipidemia who presents for evaluation of proteinuria    Diagnoses and all orders for this visit:    Essential (primary) hypertension  - blood pressures currently are uncontrolled with average blood pressures at home around 084-915 systolic so has improved  - given his age and comorbidities his goal blood pressure should be 120 to 130 over 70-80  - he is currently on amlodipine 10 mg daily, metoprolol 50 mg twice daily, losartan 100 mg daily, and chlorthalidone 25 mg daily which she is tolerating well  -given that his blood pressures are still above goal will add spironolactone 25 mg daily  - will repeat  Labs in 1 week to include electrolytes to make sure these are stable  -I have asked him to again continue to check his blood pressures at home and send me a note through my chart or through the office and let me know what his average blood pressures are  -he did bring his blood pressure cuff and and this correlates with our blood pressure his home blood pressures were reviewed on his blood pressure device  - his echocardiogram was reviewed as well he should have further follow-up with cardiology to monitor his valvular heart disease- he does have LVH so blood pressure control is very important and this was discussed in detail with him at his last office appointment    Persistent proteinuria  - in the setting of prolonged diabetes  -he does have nephrotic range proteinuria serologic workup is negative and his creatinine is stable  -we will continue to monitor with ARB use will start spironolactone as well which will likely help    Nephropathy  -serologies negative will monitor at this point    Gout  - on allopurinol 300 mg daily  - has been gout free, will monitor and continue    Bilateral leg edema  - left greater than right,  - He does take furosemide as needed however have asked him to adhere to a low-sodium diet and try compression stockings or Ace wrap, he denies any pain any warmth in the area on his right leg    DISCUSSION:    Patient Instructions     Patient Instructions   RATNA Santos here today for follow-up of the protein in your urine and the high blood pressure  The protein in the urine is likely related to your diabetes  We did an extensive workup which included blood tests and urine studies which were all negative  Fortunately your kidney number is stable  We will continue diabetic control as you are doing, continue the losartan at its current dose which helps with the protein in the urine  4 year blood pressure the year average blood pressure before was around 145 on the top number  Now it is closer to 135-140 on the topat home  I commended on checking your blood pressures at home these are  More accurate than your office blood pressure  Year goal blood pressure should be around 120/80  Continue urine metoprolol at its current dose, the amlodipine 10 mg daily, losartan 100 mg daily, chlorthalidone 25 mg daily, we will add spironolactone 25 mg daily as this will help your blood pressure as well as help the protein in the urine  Please check a repeat basic metabolic profile which is blood work along with some iron studies in about 1 week after you start the new medicine  Please call me and about 2 weeks to 1 month or send me a message on my CHART to let me know how your blood pressures are doing at home  If everything is stable I will see you back in about 3 months  Please call me with any questions or concerns prior to that  Especially if you are having problems with the medicines       follow-up in 6 months    HPI:  Britney Cadena is a 57 y o male who was referred by Ashleigh Andres for evaluation of  Initial evaluation    He is 57years old and has a history of type 2 diabetes mellitus currently on oral medications and follows up with Endocrinology, it was noticed that he had a microalbumin to creatinine ratio that was elevated  To 3538, his hemoglobin A1c did improve    Since his last office visit his blood pressures have improved with the addition of chlorthalidone instead of hydrochlorothiazide although still not at goal his electrolytes remained stable his urine output is okay he denies any fevers or chills nausea vomiting diarrhea or constipation his blood pressures are elevated in the office but his home blood pressures were reviewed and are much better controlled    He currently denies any fevers or chills nausea vomiting diarrhea or constipation has no for bloody urine does complain of foamy urine      I personally spent over half of a total 60 minutes face to face with the patient in counseling and discussion and/or coordination of care as described above  ROS: All the systems were reviewed and were negative except as documented on the HPI  Allergies: Lamisil [terbinafine]; Other; and Latex    Medications:   Current Outpatient Prescriptions:     allopurinol (ZYLOPRIM) 300 mg tablet, TAKE 1 TABLET DAILY  , Disp: 90 tablet, Rfl: 1    amLODIPine (NORVASC) 10 mg tablet, Take 1 tablet (10 mg total) by mouth daily, Disp: 90 tablet, Rfl: 3    aspirin 81 MG tablet, Take 1 tablet by mouth daily, Disp: , Rfl:     chlorthalidone 25 mg tablet, Take 1 tablet (25 mg total) by mouth daily, Disp: 90 tablet, Rfl: 3    Cholecalciferol (VITAMIN D-3) 1000 units CAPS, Take 1 capsule by mouth every morning  , Disp: , Rfl:     Dupilumab (DUPIXENT SC), Inject under the skin every 14 (fourteen) days, Disp: , Rfl:     furosemide (LASIX) 40 mg tablet, Take 40 mg by mouth 2 (two) times a day as needed , Disp: , Rfl:     glimepiride (AMARYL) 4 mg tablet, TAKE 1 TABLET TWICE A DAY, Disp: 180 tablet, Rfl: 1    glucose blood (ONE TOUCH ULTRA TEST) test strip, 1 each by Other route 2 (two) times a day, Disp: 100 each, Rfl: 3    hydrOXYzine (ATARAX) 10 mg/5 mL syrup, Take 10 mg by mouth 3 (three) times a day, Disp: , Rfl:     Insulin Degludec-Liraglutide (Insulin Degludec-Liraglutide) 100 units-3 6 mg/mL injection pen, Inject 30 Units under the skin daily, Disp: 18 pen, Rfl: 3    Linagliptin 5 MG TABS, Take 5 mg by mouth daily, Disp: 90 mg, Rfl: 3    losartan (COZAAR) 100 MG tablet, Take 1 tablet (100 mg total) by mouth daily, Disp: 90 tablet, Rfl: 3    metFORMIN (GLUCOPHAGE) 500 mg tablet, Take 500 mg by mouth 2 (two) times a day with meals 1eusf=0524os /twice a day, Disp: , Rfl:     metoprolol tartrate (LOPRESSOR) 100 mg tablet, TAKE 1 TABLET EVERY DAY, Disp: 90 tablet, Rfl: 0    Multiple Vitamin (MULTIVITAMIN) tablet, Take 1 tablet by mouth daily, Disp: , Rfl:     omega-3-acid ethyl esters (LOVAZA) 1 g capsule, Take 2 capsules (2 g total) by mouth 2 (two) times a day, Disp: 360 capsule, Rfl: 3    simvastatin (ZOCOR) 40 mg tablet, TAKE 1 TABLET DAILY, Disp: 90 tablet, Rfl: 1    spironolactone (ALDACTONE) 25 mg tablet, Take 1 tablet (25 mg total) by mouth daily for 360 doses, Disp: 90 tablet, Rfl: 3    Past Medical History:   Diagnosis Date    Arthritis     OA    Bruise of both arms     forearms and both hands    Bruises easily     Diabetes mellitus (HCC)     Eczema     Gout     Hyperlipidemia     Hypertension     Seasonal allergies     Toe infection     bilat great toes    Walks frequently     Wears dentures     upper    Wears glasses      Past Surgical History:   Procedure Laterality Date    CARPAL TUNNEL RELEASE Left     CARPAL TUNNEL RELEASE Right     KNEE ARTHROSCOPY Left     KNEE ARTHROSCOPY Right     MN AMPUTATION TOE,I-P JT Bilateral 5/2/2017    Procedure: PARTIAL AMPUTATION RIGHT AND LEFT HALLUX ;  Surgeon: Lazarus Douglas DPM;  Location: [facility]; Service: Podiatry    MN AMPUTATION TOE,MT-P JT Left 7/25/2017    Procedure: 2ND TOE AMPUTATION;  Surgeon: Ramya Sanchez DPM;  Location: [facility];  Service: Podiatry    SHOULDER ARTHROSCOPY Left     with screws,RTC    VASECTOMY       Family History   Problem Relation Age of Onset    No Known Problems Mother     Hypertension Father       reports that he quit smoking about 8 years ago  His smoking use included Cigarettes  He has a 5 00 pack-year smoking history  He has never used smokeless tobacco  He reports that he does not drink alcohol or use drugs  Physical Exam:   Vitals:    07/13/18 0938 07/13/18 0949 07/13/18 0950 07/13/18 0951   BP: 168/76 (!) 189/71 150/72 150/62   BP Location: Left arm      Patient Position: Sitting      Cuff Size: Large      Pulse: 70      Weight: 106 kg (233 lb 12 8 oz)      Height: 5' 10" (1 778 m)        Body mass index is 33 55 kg/m²      General: conscious, cooperative, in not acute distress  Eyes: conjunctivae pink, anicteric sclerae  ENT: lips and mucous membranes moist  Neck: supple, no JVD  Chest: clear breath sounds bilateral, no crackles, ronchus or wheezings  CVS: distinct S1 & S2, normal rate, regular rhythm  Abdomen: soft, non-tender, non-distended, normoactive bowel sounds  Extremities:   2+ pitting edema  Skin: no rash  Neuro: awake, alert, oriented      Lab Results:     Results for orders placed or performed in visit on 06/28/18   Comprehensive metabolic panel Lab Collect   Result Value Ref Range    Sodium 135 (L) 136 - 145 mmol/L    Potassium 4 5 3 5 - 5 3 mmol/L    Chloride 101 100 - 108 mmol/L    CO2 25 21 - 32 mmol/L    Anion Gap 9 4 - 13 mmol/L    BUN 28 (H) 5 - 25 mg/dL    Creatinine 1 11 0 60 - 1 30 mg/dL    Glucose, Fasting 157 (H) 65 - 99 mg/dL    Calcium 9 4 8 3 - 10 1 mg/dL    AST 25 5 - 45 U/L    ALT 36 12 - 78 U/L    Alkaline Phosphatase 77 46 - 116 U/L    Total Protein 7 2 6 4 - 8 2 g/dL    Albumin 3 3 (L) 3 5 - 5 0 g/dL    Total Bilirubin 0 76 0 20 - 1 00 mg/dL    eGFR 70 ml/min/1 73sq m   HEMOGLOBIN A1C W/ EAG ESTIMATION Lab Collect   Result Value Ref Range    Hemoglobin A1C 7 1 (H) 4 2 - 6 3 %     mg/dl   Lipid panel Lab Collect Lab Collect   Result Value Ref Range    Cholesterol 109 50 - 200 mg/dL    Triglycerides 120 <=150 mg/dL    HDL, Direct 36 (L) 40 - 60 mg/dL    LDL Calculated 49 0 - 100 mg/dL    Non-HDL-Chol (CHOL-HDL) 73 mg/dl   TSH, 3rd generation Lab Collect   Result Value Ref Range    TSH 3RD GENERATON 2 410 0 358 - 3 740 uIU/mL   T4, free Lab Collect   Result Value Ref Range    Free T4 0 93 0 76 - 1 46 ng/dL   Microalbumin / creatinine urine ratio Lab Collect   Result Value Ref Range    Creatinine, Ur 93 1 mg/dL    Microalbum  ,U,Random 3,760 0 (H) 0 0 - 20 0 mg/L    Microalb Creat Ratio 4,039 (H) 0 - 30 mg/g creatinine       Echocardiogram 8/9/15  - left ventricle had an EF of 60% wall thickness was mildly increased with mild concentric hypertrophy and an abnormal left ventricular relaxation  - left atrium was mildly to moderately dilated  - right atrium was mildly dilated  - mitral valve shows mild annular calcification    Portions of the record may have been created with voice recognition software  Occasional wrong word or "sound a like" substitutions may have occurred due to the inherent limitations of voice recognition software  Read the chart carefully and recognize, using context, where substitutions have occurred  If you have any questions, please contact the dictating provider

## 2018-07-13 NOTE — PATIENT INSTRUCTIONS
Estefany Valverde here today for follow-up of the protein in your urine and the high blood pressure  The protein in the urine is likely related to your diabetes  We did an extensive workup which included blood tests and urine studies which were all negative  Fortunately your kidney number is stable  We will continue diabetic control as you are doing, continue the losartan at its current dose which helps with the protein in the urine  4 year blood pressure the year average blood pressure before was around 145 on the top number  Now it is closer to 135-140 on the topat home  I commended on checking your blood pressures at home these are  More accurate than your office blood pressure  Year goal blood pressure should be around 120/80  Continue urine metoprolol at its current dose, the amlodipine 10 mg daily, losartan 100 mg daily, chlorthalidone 25 mg daily, we will add spironolactone 25 mg daily as this will help your blood pressure as well as help the protein in the urine  Please check a repeat basic metabolic profile which is blood work along with some iron studies in about 1 week after you start the new medicine  Please call me and about 2 weeks to 1 month or send me a message on my CHART to let me know how your blood pressures are doing at home  If everything is stable I will see you back in about 3 months  Please call me with any questions or concerns prior to that  Especially if you are having problems with the medicines

## 2018-07-17 DIAGNOSIS — E11.8 TYPE 2 DIABETES MELLITUS WITH COMPLICATION, UNSPECIFIED WHETHER LONG TERM INSULIN USE: Primary | ICD-10-CM

## 2018-07-17 NOTE — TELEPHONE ENCOUNTER
Nader Thompson called, requesing a refill of pts metformin  I called her to get clarification - is he taking 500mg BID or 1000mg BID? Sig is unclear

## 2018-07-19 DIAGNOSIS — E11.40 TYPE 2 DIABETES MELLITUS WITH DIABETIC NEUROPATHY, WITHOUT LONG-TERM CURRENT USE OF INSULIN (HCC): ICD-10-CM

## 2018-07-19 RX ORDER — (INSULIN DEGLUDEC AND LIRAGLUTIDE) 100; 3.6 [IU]/ML; MG/ML
INJECTION, SOLUTION SUBCUTANEOUS
Qty: 10 PEN | Refills: 3 | Status: SHIPPED | OUTPATIENT
Start: 2018-07-19 | End: 2018-07-20 | Stop reason: SDUPTHER

## 2018-07-20 DIAGNOSIS — E11.40 TYPE 2 DIABETES MELLITUS WITH DIABETIC NEUROPATHY, WITHOUT LONG-TERM CURRENT USE OF INSULIN (HCC): Primary | ICD-10-CM

## 2018-07-23 ENCOUNTER — APPOINTMENT (OUTPATIENT)
Dept: LAB | Facility: CLINIC | Age: 63
End: 2018-07-23
Payer: COMMERCIAL

## 2018-07-23 ENCOUNTER — TRANSCRIBE ORDERS (OUTPATIENT)
Dept: LAB | Facility: CLINIC | Age: 63
End: 2018-07-23

## 2018-07-23 DIAGNOSIS — I10 ESSENTIAL HYPERTENSION: ICD-10-CM

## 2018-07-23 DIAGNOSIS — R80.1 PERSISTENT PROTEINURIA: ICD-10-CM

## 2018-07-23 DIAGNOSIS — N28.9 NEPHROPATHY: ICD-10-CM

## 2018-07-23 LAB
ANION GAP SERPL CALCULATED.3IONS-SCNC: 5 MMOL/L (ref 4–13)
BUN SERPL-MCNC: 34 MG/DL (ref 5–25)
CALCIUM SERPL-MCNC: 9.5 MG/DL (ref 8.3–10.1)
CHLORIDE SERPL-SCNC: 107 MMOL/L (ref 100–108)
CO2 SERPL-SCNC: 23 MMOL/L (ref 21–32)
CREAT SERPL-MCNC: 1.29 MG/DL (ref 0.6–1.3)
FERRITIN SERPL-MCNC: 85 NG/ML (ref 8–388)
GFR SERPL CREATININE-BSD FRML MDRD: 58 ML/MIN/1.73SQ M
GLUCOSE P FAST SERPL-MCNC: 104 MG/DL (ref 65–99)
IRON SATN MFR SERPL: 23 %
IRON SERPL-MCNC: 82 UG/DL (ref 65–175)
POTASSIUM SERPL-SCNC: 5.5 MMOL/L (ref 3.5–5.3)
SODIUM SERPL-SCNC: 135 MMOL/L (ref 136–145)
TIBC SERPL-MCNC: 351 UG/DL (ref 250–450)

## 2018-07-23 PROCEDURE — 83540 ASSAY OF IRON: CPT

## 2018-07-23 PROCEDURE — 83550 IRON BINDING TEST: CPT

## 2018-07-23 PROCEDURE — 36415 COLL VENOUS BLD VENIPUNCTURE: CPT

## 2018-07-23 PROCEDURE — 82728 ASSAY OF FERRITIN: CPT

## 2018-07-23 PROCEDURE — 80048 BASIC METABOLIC PNL TOTAL CA: CPT

## 2018-07-24 ENCOUNTER — TELEPHONE (OUTPATIENT)
Dept: NEPHROLOGY | Facility: CLINIC | Age: 63
End: 2018-07-24

## 2018-07-24 ENCOUNTER — TELEPHONE (OUTPATIENT)
Dept: ENDOCRINOLOGY | Facility: CLINIC | Age: 63
End: 2018-07-24

## 2018-07-24 NOTE — TELEPHONE ENCOUNTER
----- Message from Gama Garcia DO sent at 7/24/2018  3:19 PM EDT -----  Please let him know I reviewed his blood his potassium did increase to 5 5 with the addition of spironolactone  Therefore he should probably discontinue this medicine  Continue to monitor his blood pressure and let us know through my chart or with a phone call how is BP is doing  Any questions let me know thanks

## 2018-07-24 NOTE — TELEPHONE ENCOUNTER
----- Message from Jc Castro sent at 2018  7:34 AM EDT -----  Regarding: RE:Your Recent Visit  Contact: [phone]  JOE Padilla Meuse  I need my script for  XULTOPHY 100 UNIT-3 6MG/ML PEN  refilled at [facility] in [place]  It was available for refill on 7/15/18  Please send in a 90 day refill so I can use it until once I am on Medicare,  I figure out what 2 medications I will choose to substitute for this  Thanks, Jc Castro  53  ----- Message -----  From: PAT Callaway  Sent: 2018 12:36 PM EDT  To: Jc Castro  Subject: Your Recent Visit  Jc Castro, you have a new After Visit Summary in [address] [place] [address]  Please click on visits and then your most recent appointment, to view your After Visit Summary  If you are experiencing any technical issues please call our patient service desk at [phone]) option [phone]

## 2018-07-24 NOTE — TELEPHONE ENCOUNTER
I called and spoke with Rita Retana  He is aware of his elevated potassium level  He will stop taking spironolactone per Dr Colten Redmond starting tomorrow  He will give us a call in about one week with BP readings  No other concerns at this time

## 2018-07-25 DIAGNOSIS — E11.40 TYPE 2 DIABETES MELLITUS WITH DIABETIC NEUROPATHY, WITHOUT LONG-TERM CURRENT USE OF INSULIN (HCC): ICD-10-CM

## 2018-08-03 ENCOUNTER — TELEPHONE (OUTPATIENT)
Dept: NEPHROLOGY | Facility: CLINIC | Age: 63
End: 2018-08-03

## 2018-08-03 NOTE — TELEPHONE ENCOUNTER
----- Message from Roberto Stiles sent at 8/3/2018 10:33 AM EDT -----  Regarding: Non-Urgent Medical Question  Contact: [phone]  blood pressure results  Sat  138/66  Sun  120/66  mon  128/62  tues  115/58  Wed 126/58  thur  131/60  fri   136/60  These were results for the past week , Thank you, Vikas Maharaj

## 2018-08-26 DIAGNOSIS — I10 ESSENTIAL HYPERTENSION: Primary | ICD-10-CM

## 2018-08-26 RX ORDER — LOSARTAN POTASSIUM AND HYDROCHLOROTHIAZIDE 25; 100 MG/1; MG/1
TABLET ORAL
Qty: 90 TABLET | Refills: 0 | Status: SHIPPED | OUTPATIENT
Start: 2018-08-26 | End: 2018-11-27

## 2018-09-04 ENCOUNTER — TELEPHONE (OUTPATIENT)
Dept: FAMILY MEDICINE CLINIC | Facility: CLINIC | Age: 63
End: 2018-09-04

## 2018-09-04 NOTE — TELEPHONE ENCOUNTER
Received medical records request from [address] [place] [address] requesting records from 09/01/2016 to 07/01/2017  Faxed request to [facility] for processing 09/04/18

## 2018-09-20 DIAGNOSIS — E13.8 DIABETES MELLITUS OF OTHER TYPE WITH COMPLICATION, UNSPECIFIED WHETHER LONG TERM INSULIN USE: Primary | ICD-10-CM

## 2018-10-11 ENCOUNTER — CLINICAL SUPPORT (OUTPATIENT)
Dept: FAMILY MEDICINE CLINIC | Facility: CLINIC | Age: 63
End: 2018-10-11
Payer: MEDICARE

## 2018-10-11 DIAGNOSIS — Z23 NEED FOR INFLUENZA VACCINATION: Primary | ICD-10-CM

## 2018-10-11 PROCEDURE — G0008 ADMIN INFLUENZA VIRUS VAC: HCPCS

## 2018-10-11 PROCEDURE — 90662 IIV NO PRSV INCREASED AG IM: CPT

## 2018-10-12 ENCOUNTER — APPOINTMENT (OUTPATIENT)
Dept: LAB | Facility: CLINIC | Age: 63
End: 2018-10-12
Payer: MEDICARE

## 2018-10-12 DIAGNOSIS — E11.40 TYPE 2 DIABETES MELLITUS WITH DIABETIC NEUROPATHY, WITHOUT LONG-TERM CURRENT USE OF INSULIN (HCC): ICD-10-CM

## 2018-10-12 DIAGNOSIS — E78.2 MIXED HYPERLIPIDEMIA: ICD-10-CM

## 2018-10-12 LAB
ALBUMIN SERPL BCP-MCNC: 3 G/DL (ref 3.5–5)
ALP SERPL-CCNC: 75 U/L (ref 46–116)
ALT SERPL W P-5'-P-CCNC: 37 U/L (ref 12–78)
ANION GAP SERPL CALCULATED.3IONS-SCNC: 7 MMOL/L (ref 4–13)
AST SERPL W P-5'-P-CCNC: 29 U/L (ref 5–45)
BILIRUB SERPL-MCNC: 0.59 MG/DL (ref 0.2–1)
BUN SERPL-MCNC: 23 MG/DL (ref 5–25)
CALCIUM SERPL-MCNC: 9.1 MG/DL (ref 8.3–10.1)
CHLORIDE SERPL-SCNC: 106 MMOL/L (ref 100–108)
CHOLEST SERPL-MCNC: 117 MG/DL (ref 50–200)
CO2 SERPL-SCNC: 24 MMOL/L (ref 21–32)
CREAT SERPL-MCNC: 1.12 MG/DL (ref 0.6–1.3)
CREAT UR-MCNC: 107 MG/DL
EST. AVERAGE GLUCOSE BLD GHB EST-MCNC: 146 MG/DL
GFR SERPL CREATININE-BSD FRML MDRD: 69 ML/MIN/1.73SQ M
GLUCOSE P FAST SERPL-MCNC: 136 MG/DL (ref 65–99)
HBA1C MFR BLD: 6.7 % (ref 4.2–6.3)
HDLC SERPL-MCNC: 33 MG/DL (ref 40–60)
LDLC SERPL CALC-MCNC: 47 MG/DL (ref 0–100)
MICROALBUMIN UR-MCNC: 6020 MG/L (ref 0–20)
MICROALBUMIN/CREAT 24H UR: 5626 MG/G CREATININE (ref 0–30)
POTASSIUM SERPL-SCNC: 4.5 MMOL/L (ref 3.5–5.3)
PROT SERPL-MCNC: 7.2 G/DL (ref 6.4–8.2)
SODIUM SERPL-SCNC: 137 MMOL/L (ref 136–145)
TRIGL SERPL-MCNC: 183 MG/DL

## 2018-10-12 PROCEDURE — 36415 COLL VENOUS BLD VENIPUNCTURE: CPT

## 2018-10-12 PROCEDURE — 82043 UR ALBUMIN QUANTITATIVE: CPT

## 2018-10-12 PROCEDURE — 80061 LIPID PANEL: CPT

## 2018-10-12 PROCEDURE — 80053 COMPREHEN METABOLIC PANEL: CPT

## 2018-10-12 PROCEDURE — 83036 HEMOGLOBIN GLYCOSYLATED A1C: CPT

## 2018-10-12 PROCEDURE — 82570 ASSAY OF URINE CREATININE: CPT

## 2018-10-19 ENCOUNTER — TELEPHONE (OUTPATIENT)
Dept: FAMILY MEDICINE CLINIC | Facility: CLINIC | Age: 63
End: 2018-10-19

## 2018-10-19 NOTE — TELEPHONE ENCOUNTER
Received in the mail a medical records release form from [facility] faxed to [facility] for processing on 10/19/2018

## 2018-10-22 ENCOUNTER — OFFICE VISIT (OUTPATIENT)
Dept: FAMILY MEDICINE CLINIC | Facility: CLINIC | Age: 63
End: 2018-10-22
Payer: MEDICARE

## 2018-10-22 VITALS
TEMPERATURE: 96.9 F | WEIGHT: 235 LBS | DIASTOLIC BLOOD PRESSURE: 62 MMHG | HEART RATE: 68 BPM | SYSTOLIC BLOOD PRESSURE: 144 MMHG | BODY MASS INDEX: 32.9 KG/M2 | OXYGEN SATURATION: 98 % | HEIGHT: 71 IN

## 2018-10-22 DIAGNOSIS — R42 VERTIGO: Primary | ICD-10-CM

## 2018-10-22 DIAGNOSIS — I10 ESSENTIAL HYPERTENSION: ICD-10-CM

## 2018-10-22 PROCEDURE — 99214 OFFICE O/P EST MOD 30 MIN: CPT | Performed by: FAMILY MEDICINE

## 2018-10-22 RX ORDER — FUROSEMIDE 40 MG/1
40 TABLET ORAL DAILY PRN
Qty: 90 TABLET | Refills: 1 | Status: SHIPPED | OUTPATIENT
Start: 2018-10-22 | End: 2018-12-03 | Stop reason: SDUPTHER

## 2018-10-22 RX ORDER — MECLIZINE HYDROCHLORIDE 25 MG/1
25 TABLET ORAL 3 TIMES DAILY PRN
Qty: 30 TABLET | Refills: 0 | Status: SHIPPED | OUTPATIENT
Start: 2018-10-22 | End: 2018-10-29 | Stop reason: SDUPTHER

## 2018-10-22 RX ORDER — METOPROLOL TARTRATE 100 MG/1
100 TABLET ORAL EVERY 12 HOURS SCHEDULED
Qty: 180 TABLET | Refills: 3 | Status: SHIPPED | OUTPATIENT
Start: 2018-10-22 | End: 2018-11-27

## 2018-10-22 NOTE — PROGRESS NOTES
Assessment/Plan:    Patient with no significant findings on physical exam today  He likely is having symptoms of mild intermittent benign positional vertigo  Recommended meclizine as needed over the next 1 week  He will call with any persisting or worsening symptoms over the next week  Consider MRI of the brain if symptoms persist or worsen  He will call if any new symptoms develop as well  Patient also has history of essential hypertension  Hypertension is controlled but he does need refill on current medications  No problem-specific Assessment & Plan notes found for this encounter  Diagnoses and all orders for this visit:    Vertigo  -     meclizine (ANTIVERT) 25 mg tablet; Take 1 tablet (25 mg total) by mouth 3 (three) times a day as needed for dizziness    Essential hypertension  -     furosemide (LASIX) 40 mg tablet; Take 1 tablet (40 mg total) by mouth daily as needed (edema)  -     metoprolol tartrate (LOPRESSOR) 100 mg tablet; Take 1 tablet (100 mg total) by mouth every 12 (twelve) hours    Other orders  -     betamethasone valerate (VALISONE) 0 1 % cream; APPLY TO BACK,ARMS,CHEST,LEGS AND ABDOMEN TWICE A DAY          Subjective:      Patient ID: Bill Schuler is a 70 y o  male  Patient with generalized nausea and occasional vertigo symptoms intermittently over the last 5-7 days  Symptoms are mild and intermittent  He denies any chest pain shortness of breath or palpitations  He has had similar episodes in the past with mild vertigo  Denies any activity precipitating these recent symptoms  He notes with changes in position and turning the head quickly that symptoms seem to be worse  He currently is not taking any medications for this  He is on no new medications  Nausea   Associated symptoms include nausea and vomiting  Vomiting    Associated symptoms include dizziness         The following portions of the patient's history were reviewed and updated as appropriate: allergies, current medications, past family history, past medical history, past social history, past surgical history and problem list     Review of Systems   Constitutional: Negative  HENT: Negative  Eyes: Negative  Respiratory: Negative  Cardiovascular: Negative  Gastrointestinal: Positive for nausea and vomiting  Endocrine: Negative  Genitourinary: Negative  Musculoskeletal: Negative  Skin: Negative  Allergic/Immunologic: Negative  Neurological: Positive for dizziness  Hematological: Negative  Psychiatric/Behavioral: Negative  Objective:      /62 (BP Location: Left arm, Patient Position: Sitting, Cuff Size: Standard)   Pulse 68   Temp (!) 96 9 °F (36 1 °C) (Oral)   Ht 5' 10 67" (1 795 m)   Wt 107 kg (235 lb)   SpO2 98%   BMI 33 08 kg/m²          Physical Exam   Constitutional: He is oriented to person, place, and time  He appears well-developed and well-nourished  HENT:   Head: Normocephalic and atraumatic  Right Ear: External ear normal  Tympanic membrane is not erythematous and not bulging  Left Ear: External ear normal  Tympanic membrane is not erythematous and not bulging  Nose: Nose normal    Mouth/Throat: Oropharynx is clear and moist and mucous membranes are normal  No oral lesions  No oropharyngeal exudate  Eyes: Conjunctivae and EOM are normal  Right eye exhibits no discharge  Left eye exhibits no discharge  No scleral icterus  Neck: Normal range of motion  Neck supple  No thyromegaly present  Cardiovascular: Normal rate, regular rhythm and normal heart sounds  Exam reveals no gallop and no friction rub  No murmur heard  Pulmonary/Chest: Effort normal  No respiratory distress  He has no wheezes  He has no rales  He exhibits no tenderness  Abdominal: Soft  Bowel sounds are normal  He exhibits no distension and no mass  There is no tenderness  There is no rebound and no guarding  Musculoskeletal: Normal range of motion   He exhibits no edema, tenderness or deformity  Lymphadenopathy:     He has no cervical adenopathy  Neurological: He is alert and oriented to person, place, and time  He has normal reflexes  No cranial nerve deficit  He exhibits normal muscle tone  Coordination normal    Skin: Skin is warm and dry  No rash noted  No erythema  No pallor  Psychiatric: He has a normal mood and affect  His behavior is normal    Vitals reviewed

## 2018-10-25 ENCOUNTER — TELEPHONE (OUTPATIENT)
Dept: ENDOCRINOLOGY | Facility: CLINIC | Age: 63
End: 2018-10-25

## 2018-10-25 NOTE — TELEPHONE ENCOUNTER
----- Message from [place] sent at 10/25/2018  1:06 PM EDT -----  Please call the patient regarding his abnormal result  Microalbumin severely elevated, continue following with nephrology   A1C at goal, triglycerides slightly elevated

## 2018-10-29 ENCOUNTER — OFFICE VISIT (OUTPATIENT)
Dept: FAMILY MEDICINE CLINIC | Facility: CLINIC | Age: 63
End: 2018-10-29
Payer: MEDICARE

## 2018-10-29 VITALS
DIASTOLIC BLOOD PRESSURE: 68 MMHG | HEART RATE: 72 BPM | WEIGHT: 235 LBS | TEMPERATURE: 98.1 F | SYSTOLIC BLOOD PRESSURE: 140 MMHG | BODY MASS INDEX: 33.64 KG/M2 | RESPIRATION RATE: 16 BRPM | HEIGHT: 70 IN

## 2018-10-29 DIAGNOSIS — R42 VERTIGO: ICD-10-CM

## 2018-10-29 DIAGNOSIS — E11.9 TYPE 2 DIABETES MELLITUS WITHOUT COMPLICATION, UNSPECIFIED WHETHER LONG TERM INSULIN USE (HCC): ICD-10-CM

## 2018-10-29 PROCEDURE — 99214 OFFICE O/P EST MOD 30 MIN: CPT | Performed by: FAMILY MEDICINE

## 2018-10-29 RX ORDER — MECLIZINE HYDROCHLORIDE 25 MG/1
25 TABLET ORAL 3 TIMES DAILY PRN
Qty: 30 TABLET | Refills: 1 | Status: SHIPPED | OUTPATIENT
Start: 2018-10-29 | End: 2018-11-27

## 2018-10-29 RX ORDER — HYDROXYZINE HYDROCHLORIDE 10 MG/1
10 TABLET, FILM COATED ORAL EVERY 6 HOURS PRN
COMMUNITY
Start: 2018-10-24 | End: 2020-05-19

## 2018-10-29 RX ORDER — AMOXICILLIN 500 MG/1
500 CAPSULE ORAL EVERY 8 HOURS SCHEDULED
Qty: 30 CAPSULE | Refills: 0 | Status: SHIPPED | OUTPATIENT
Start: 2018-10-29 | End: 2018-11-08

## 2018-10-29 NOTE — PROGRESS NOTES
Assessment/Plan:    Discussed diagnostic and treatment options with patient  Patient will be scheduled for MRI of the brain without contrast   Will heed results  Patient was started on amoxicillin 500 mg 1 t i d  times 10 days and continue meclizine 25 mg 1 t i d  p r n  Recommend patient increase fluids and rest   Return to the office in 1-2 weeks or sooner p r n   If symptoms persist discussed referral to ENT and/or Neurology  Diagnoses and all orders for this visit:    Vertigo  Comments:  MRI of the brain without contrast   Amoxicillin 500 mg t i d  times 10 days and continue meclizine 25 mg 1 t i d  p r n   Increase fluids and rest   Orders:  -     meclizine (ANTIVERT) 25 mg tablet; Take 1 tablet (25 mg total) by mouth 3 (three) times a day as needed for dizziness  -     amoxicillin (AMOXIL) 500 mg capsule; Take 1 capsule (500 mg total) by mouth every 8 (eight) hours for 10 days  -     MRI brain wo contrast; Future    Other orders  -     hydrOXYzine HCL (ATARAX) 10 mg tablet;           Subjective:      Patient ID: Trevor English is a 70 y o  male  Patient is being seen in follow-up for dizziness  Patient was started on meclizine over a week ago with some improvement although still gets intermittent dizziness with changing positions  He admits to nausea but denies any vomiting  He denies fever or headache  Patient admits to nasal congestion, sore throat and ears feeling congested  Patient had similar episode of vertigo several years ago which resolved with meclizine  Dizziness   This is a new problem  The current episode started 1 to 4 weeks ago  The problem occurs intermittently  The problem has been gradually improving  Associated symptoms include congestion, coughing, diaphoresis, fatigue, nausea, a rash, a sore throat and vertigo  Pertinent negatives include no anorexia, change in bowel habit, chills, fever, headaches, neck pain, numbness, visual change, vomiting or weakness   The symptoms are aggravated by bending and standing  He has tried rest and position changes (meclizine) for the symptoms  The treatment provided moderate relief  The following portions of the patient's history were reviewed and updated as appropriate: allergies, current medications, past family history, past medical history, past social history, past surgical history and problem list     Review of Systems   Constitutional: Positive for diaphoresis and fatigue  Negative for chills and fever  HENT: Positive for congestion and sore throat  Respiratory: Positive for cough  Gastrointestinal: Positive for nausea  Negative for anorexia, change in bowel habit and vomiting  Musculoskeletal: Negative for neck pain  Skin: Positive for rash  Neurological: Positive for dizziness and vertigo  Negative for weakness, numbness and headaches  Objective:      /68 (BP Location: Left arm, Patient Position: Sitting, Cuff Size: Large)   Pulse 72   Temp 98 1 °F (36 7 °C) (Tympanic)   Resp 16   Ht 5' 10" (1 778 m)   Wt 107 kg (235 lb)   BMI 33 72 kg/m²          Physical Exam   Constitutional: He is oriented to person, place, and time  He appears well-developed and well-nourished  No distress  HENT:   Head: Normocephalic  Right Ear: External ear normal    Left Ear: External ear normal    Positive turbinates swelling with mucoid drainage  Throat postnasal drainage and injected  Mucous membranes moist    Eyes: Conjunctivae are normal  No scleral icterus  Neck: Neck supple  No thyromegaly present  Cardiovascular: Normal rate and regular rhythm  Pulmonary/Chest: Effort normal and breath sounds normal    Abdominal: Soft  There is no tenderness  Musculoskeletal: He exhibits no edema  Lymphadenopathy:     He has no cervical adenopathy  Neurological: He is alert and oriented to person, place, and time  Skin: Skin is warm and dry  Psychiatric: He has a normal mood and affect

## 2018-10-30 RX ORDER — GLIMEPIRIDE 4 MG/1
TABLET ORAL
Qty: 60 TABLET | Refills: 3 | Status: SHIPPED | OUTPATIENT
Start: 2018-10-30 | End: 2020-05-19 | Stop reason: DRUGHIGH

## 2018-11-01 ENCOUNTER — APPOINTMENT (EMERGENCY)
Dept: RADIOLOGY | Facility: HOSPITAL | Age: 63
DRG: 242 | End: 2018-11-01
Payer: MEDICARE

## 2018-11-01 ENCOUNTER — APPOINTMENT (INPATIENT)
Dept: RADIOLOGY | Facility: HOSPITAL | Age: 63
DRG: 242 | End: 2018-11-01
Payer: MEDICARE

## 2018-11-01 ENCOUNTER — APPOINTMENT (INPATIENT)
Dept: NON INVASIVE DIAGNOSTICS | Facility: HOSPITAL | Age: 63
DRG: 242 | End: 2018-11-01
Payer: MEDICARE

## 2018-11-01 ENCOUNTER — APPOINTMENT (EMERGENCY)
Dept: NON INVASIVE DIAGNOSTICS | Facility: HOSPITAL | Age: 63
DRG: 242 | End: 2018-11-01
Attending: INTERNAL MEDICINE
Payer: MEDICARE

## 2018-11-01 ENCOUNTER — TELEPHONE (OUTPATIENT)
Dept: FAMILY MEDICINE CLINIC | Facility: CLINIC | Age: 63
End: 2018-11-01

## 2018-11-01 ENCOUNTER — HOSPITAL ENCOUNTER (INPATIENT)
Facility: HOSPITAL | Age: 63
LOS: 15 days | Discharge: HOME | DRG: 242 | End: 2018-11-16
Attending: EMERGENCY MEDICINE | Admitting: INTERNAL MEDICINE
Payer: MEDICARE

## 2018-11-01 DIAGNOSIS — N17.9 ACUTE KIDNEY INJURY (HCC): ICD-10-CM

## 2018-11-01 DIAGNOSIS — I95.9 HYPOTENSION: Primary | ICD-10-CM

## 2018-11-01 DIAGNOSIS — R42 VERTIGO: Primary | ICD-10-CM

## 2018-11-01 DIAGNOSIS — I44.2 THIRD DEGREE HEART BLOCK (HCC): ICD-10-CM

## 2018-11-01 DIAGNOSIS — I48.0 PAROXYSMAL ATRIAL FIBRILLATION (HCC): ICD-10-CM

## 2018-11-01 DIAGNOSIS — G47.33 OSA (OBSTRUCTIVE SLEEP APNEA): ICD-10-CM

## 2018-11-01 DIAGNOSIS — R33.9 URINARY RETENTION: ICD-10-CM

## 2018-11-01 DIAGNOSIS — I50.31 ACUTE DIASTOLIC (CONGESTIVE) HEART FAILURE (HCC): ICD-10-CM

## 2018-11-01 DIAGNOSIS — I10 ESSENTIAL HYPERTENSION: ICD-10-CM

## 2018-11-01 DIAGNOSIS — I46.9 CARDIAC ARREST (HCC): ICD-10-CM

## 2018-11-01 DIAGNOSIS — R09.2 RESPIRATORY ARREST (HCC): ICD-10-CM

## 2018-11-01 DIAGNOSIS — I44.2 COMPLETE HEART BLOCK (HCC): ICD-10-CM

## 2018-11-01 PROBLEM — E87.20 METABOLIC ACIDOSIS: Status: ACTIVE | Noted: 2018-11-01

## 2018-11-01 PROBLEM — E87.2 METABOLIC ACIDOSIS: Status: ACTIVE | Noted: 2018-11-01

## 2018-11-01 PROBLEM — E78.49 OTHER HYPERLIPIDEMIA: Status: ACTIVE | Noted: 2018-11-01

## 2018-11-01 PROBLEM — I95.0 IDIOPATHIC HYPOTENSION: Status: ACTIVE | Noted: 2018-11-01

## 2018-11-01 LAB
ALBUMIN SERPL BCP-MCNC: 2.2 G/DL (ref 3.5–5)
ALBUMIN SERPL BCP-MCNC: 2.6 G/DL (ref 3.5–5)
ALP SERPL-CCNC: 80 U/L (ref 46–116)
ALP SERPL-CCNC: 85 U/L (ref 46–116)
ALT SERPL W P-5'-P-CCNC: 33 U/L (ref 12–78)
ALT SERPL W P-5'-P-CCNC: 61 U/L (ref 12–78)
ANION GAP SERPL CALCULATED.3IONS-SCNC: 13 MMOL/L (ref 4–13)
ANION GAP SERPL CALCULATED.3IONS-SCNC: 15 MMOL/L (ref 4–13)
ANION GAP SERPL CALCULATED.3IONS-SCNC: 15 MMOL/L (ref 4–13)
APTT PPP: 29 SECONDS (ref 24–36)
AST SERPL W P-5'-P-CCNC: 28 U/L (ref 5–45)
AST SERPL W P-5'-P-CCNC: 65 U/L (ref 5–45)
BASE EXCESS BLDA CALC-SCNC: -15.9 MMOL/L
BASE EXCESS BLDA CALC-SCNC: -23 MMOL/L (ref -2–3)
BASE EXCESS BLDA CALC-SCNC: -8.1 MMOL/L
BASOPHILS # BLD AUTO: 0.13 THOUSANDS/ΜL (ref 0–0.1)
BASOPHILS NFR BLD AUTO: 1 % (ref 0–1)
BILIRUB SERPL-MCNC: 0.35 MG/DL (ref 0.2–1)
BILIRUB SERPL-MCNC: 0.63 MG/DL (ref 0.2–1)
BUN SERPL-MCNC: 37 MG/DL (ref 5–25)
BUN SERPL-MCNC: 39 MG/DL (ref 5–25)
BUN SERPL-MCNC: 40 MG/DL (ref 5–25)
CA-I BLD-SCNC: 0.67 MMOL/L (ref 1.12–1.32)
CA-I BLD-SCNC: 1.01 MMOL/L (ref 1.12–1.32)
CA-I BLD-SCNC: 1.13 MMOL/L (ref 1.12–1.32)
CALCIUM SERPL-MCNC: 7.6 MG/DL (ref 8.3–10.1)
CALCIUM SERPL-MCNC: 8 MG/DL (ref 8.3–10.1)
CALCIUM SERPL-MCNC: 8.8 MG/DL (ref 8.3–10.1)
CHLORIDE SERPL-SCNC: 106 MMOL/L (ref 100–108)
CHLORIDE SERPL-SCNC: 107 MMOL/L (ref 100–108)
CHLORIDE SERPL-SCNC: 107 MMOL/L (ref 100–108)
CO2 SERPL-SCNC: 17 MMOL/L (ref 21–32)
CO2 SERPL-SCNC: 18 MMOL/L (ref 21–32)
CO2 SERPL-SCNC: 20 MMOL/L (ref 21–32)
CREAT SERPL-MCNC: 1.99 MG/DL (ref 0.6–1.3)
CREAT SERPL-MCNC: 2.36 MG/DL (ref 0.6–1.3)
CREAT SERPL-MCNC: 2.85 MG/DL (ref 0.6–1.3)
DS:DELIVERY SYSTEM: 45
EOSINOPHIL # BLD AUTO: 0.46 THOUSAND/ΜL (ref 0–0.61)
EOSINOPHIL NFR BLD AUTO: 2 % (ref 0–6)
ERYTHROCYTE [DISTWIDTH] IN BLOOD BY AUTOMATED COUNT: 14.5 % (ref 11.6–15.1)
GFR SERPL CREATININE-BSD FRML MDRD: 22 ML/MIN/1.73SQ M
GFR SERPL CREATININE-BSD FRML MDRD: 28 ML/MIN/1.73SQ M
GFR SERPL CREATININE-BSD FRML MDRD: 34 ML/MIN/1.73SQ M
GLUCOSE SERPL-MCNC: 142 MG/DL (ref 65–140)
GLUCOSE SERPL-MCNC: 234 MG/DL (ref 65–140)
GLUCOSE SERPL-MCNC: 284 MG/DL (ref 65–140)
GLUCOSE SERPL-MCNC: 318 MG/DL (ref 65–140)
GLUCOSE SERPL-MCNC: 318 MG/DL (ref 65–140)
GLUCOSE SERPL-MCNC: 321 MG/DL (ref 65–140)
GLUCOSE SERPL-MCNC: 343 MG/DL (ref 65–140)
GLUCOSE SERPL-MCNC: 373 MG/DL (ref 65–140)
GLUCOSE SERPL-MCNC: 434 MG/DL (ref 65–140)
HCO3 BLDA-SCNC: 13 MMOL/L (ref 22–28)
HCO3 BLDA-SCNC: 17.2 MMOL/L (ref 22–28)
HCO3 BLDA-SCNC: 5.8 MMOL/L (ref 22–28)
HCT VFR BLD AUTO: 35.6 % (ref 36.5–49.3)
HCT VFR BLD CALC: <15 % (ref 36.5–49.3)
HGB BLD-MCNC: 11.4 G/DL (ref 12–17)
HOROWITZ INDEX BLDA+IHG-RTO: 100 MM[HG]
IMM GRANULOCYTES # BLD AUTO: 0.18 THOUSAND/UL (ref 0–0.2)
IMM GRANULOCYTES NFR BLD AUTO: 1 % (ref 0–2)
INR PPP: 1.08 (ref 0.86–1.17)
LACTATE SERPL-SCNC: 8.2 MMOL/L (ref 0.5–2)
LACTATE SERPL-SCNC: 8.5 MMOL/L (ref 0.5–2)
LYMPHOCYTES # BLD AUTO: 2.74 THOUSANDS/ΜL (ref 0.6–4.47)
LYMPHOCYTES NFR BLD AUTO: 13 % (ref 14–44)
MAGNESIUM SERPL-MCNC: 2.3 MG/DL (ref 1.6–2.6)
MAGNESIUM SERPL-MCNC: 2.4 MG/DL (ref 1.6–2.6)
MCH RBC QN AUTO: 30.5 PG (ref 26.8–34.3)
MCHC RBC AUTO-ENTMCNC: 32 G/DL (ref 31.4–37.4)
MCV RBC AUTO: 95 FL (ref 82–98)
MONOCYTES # BLD AUTO: 0.82 THOUSAND/ΜL (ref 0.17–1.22)
MONOCYTES NFR BLD AUTO: 4 % (ref 4–12)
NEUTROPHILS # BLD AUTO: 16.34 THOUSANDS/ΜL (ref 1.85–7.62)
NEUTS SEG NFR BLD AUTO: 79 % (ref 43–75)
NRBC BLD AUTO-RTO: 0 /100 WBCS
NT-PROBNP SERPL-MCNC: 949 PG/ML
O2 CT BLDA-SCNC: 13.1 ML/DL (ref 16–23)
O2 CT BLDA-SCNC: 14.3 ML/DL (ref 16–23)
OXYHGB MFR BLDA: 86.9 % (ref 94–97)
OXYHGB MFR BLDA: 96 % (ref 94–97)
PCO2 BLD: 24.9 MM HG (ref 36–44)
PCO2 BLD: 7 MMOL/L (ref 21–32)
PCO2 BLDA: 34.8 MM HG (ref 36–44)
PCO2 BLDA: 42.8 MM HG (ref 36–44)
PEEP RESPIRATORY: 10 CM[H2O]
PH BLD: 6.97 [PH] (ref 7.35–7.45)
PH BLDA: 7.1 [PH] (ref 7.35–7.45)
PH BLDA: 7.31 [PH] (ref 7.35–7.45)
PHOSPHATE SERPL-MCNC: 8.1 MG/DL (ref 2.3–4.1)
PLATELET # BLD AUTO: 346 THOUSANDS/UL (ref 149–390)
PLATELET # BLD AUTO: 397 THOUSANDS/UL (ref 149–390)
PMV BLD AUTO: 9.1 FL (ref 8.9–12.7)
PMV BLD AUTO: 9.2 FL (ref 8.9–12.7)
PO2 BLD: 97 MM HG (ref 75–129)
PO2 BLDA: 65.6 MM HG (ref 75–129)
PO2 BLDA: 90.2 MM HG (ref 75–129)
POTASSIUM BLD-SCNC: <2 MMOL/L (ref 3.5–5.3)
POTASSIUM SERPL-SCNC: 4 MMOL/L (ref 3.5–5.3)
POTASSIUM SERPL-SCNC: 4.9 MMOL/L (ref 3.5–5.3)
POTASSIUM SERPL-SCNC: 5.2 MMOL/L (ref 3.5–5.3)
PROT SERPL-MCNC: 5.8 G/DL (ref 6.4–8.2)
PROT SERPL-MCNC: 6.9 G/DL (ref 6.4–8.2)
PROTHROMBIN TIME: 14.1 SECONDS (ref 11.8–14.2)
RBC # BLD AUTO: 3.74 MILLION/UL (ref 3.88–5.62)
RESPIRATORY RATE: 14
SAO2 % BLD FROM PO2: 92 % (ref 95–98)
SODIUM BLD-SCNC: 153 MMOL/L (ref 136–145)
SODIUM SERPL-SCNC: 137 MMOL/L (ref 136–145)
SODIUM SERPL-SCNC: 139 MMOL/L (ref 136–145)
SODIUM SERPL-SCNC: 142 MMOL/L (ref 136–145)
SPECIMEN SOURCE: ABNORMAL
SPECIMEN SOURCE: ABNORMAL
TROPONIN I SERPL-MCNC: 0.02 NG/ML
TROPONIN I SERPL-MCNC: 0.03 NG/ML
TROPONIN I SERPL-MCNC: 0.06 NG/ML
TROPONIN I SERPL-MCNC: 0.15 NG/ML
TSH SERPL DL<=0.05 MIU/L-ACNC: 3.5 UIU/ML (ref 0.36–3.74)
VENT AC: 22
VENT- AC: AC
VENTILATION VALUE: 5
VT SETTING VENT: 500 ML
WBC # BLD AUTO: 20.67 THOUSAND/UL (ref 4.31–10.16)

## 2018-11-01 PROCEDURE — 82948 REAGENT STRIP/BLOOD GLUCOSE: CPT

## 2018-11-01 PROCEDURE — 71045 X-RAY EXAM CHEST 1 VIEW: CPT

## 2018-11-01 PROCEDURE — 80053 COMPREHEN METABOLIC PANEL: CPT | Performed by: NURSE PRACTITIONER

## 2018-11-01 PROCEDURE — 84132 ASSAY OF SERUM POTASSIUM: CPT

## 2018-11-01 PROCEDURE — 80053 COMPREHEN METABOLIC PANEL: CPT | Performed by: EMERGENCY MEDICINE

## 2018-11-01 PROCEDURE — C1751 CATH, INF, PER/CENT/MIDLINE: HCPCS | Performed by: INTERNAL MEDICINE

## 2018-11-01 PROCEDURE — 86618 LYME DISEASE ANTIBODY: CPT | Performed by: NURSE PRACTITIONER

## 2018-11-01 PROCEDURE — 85014 HEMATOCRIT: CPT

## 2018-11-01 PROCEDURE — 84484 ASSAY OF TROPONIN QUANT: CPT | Performed by: EMERGENCY MEDICINE

## 2018-11-01 PROCEDURE — 84484 ASSAY OF TROPONIN QUANT: CPT | Performed by: NURSE PRACTITIONER

## 2018-11-01 PROCEDURE — 87040 BLOOD CULTURE FOR BACTERIA: CPT | Performed by: NURSE PRACTITIONER

## 2018-11-01 PROCEDURE — 82330 ASSAY OF CALCIUM: CPT

## 2018-11-01 PROCEDURE — 93306 TTE W/DOPPLER COMPLETE: CPT

## 2018-11-01 PROCEDURE — 5A12012 PERFORMANCE OF CARDIAC OUTPUT, SINGLE, MANUAL: ICD-10-PCS | Performed by: INTERNAL MEDICINE

## 2018-11-01 PROCEDURE — 94002 VENT MGMT INPAT INIT DAY: CPT

## 2018-11-01 PROCEDURE — 84295 ASSAY OF SERUM SODIUM: CPT

## 2018-11-01 PROCEDURE — 5A1945Z RESPIRATORY VENTILATION, 24-96 CONSECUTIVE HOURS: ICD-10-PCS | Performed by: EMERGENCY MEDICINE

## 2018-11-01 PROCEDURE — 99221 1ST HOSP IP/OBS SF/LOW 40: CPT | Performed by: INTERNAL MEDICINE

## 2018-11-01 PROCEDURE — 82947 ASSAY GLUCOSE BLOOD QUANT: CPT

## 2018-11-01 PROCEDURE — C9113 INJ PANTOPRAZOLE SODIUM, VIA: HCPCS | Performed by: NURSE PRACTITIONER

## 2018-11-01 PROCEDURE — 84100 ASSAY OF PHOSPHORUS: CPT | Performed by: NURSE PRACTITIONER

## 2018-11-01 PROCEDURE — 83735 ASSAY OF MAGNESIUM: CPT | Performed by: NURSE PRACTITIONER

## 2018-11-01 PROCEDURE — 85049 AUTOMATED PLATELET COUNT: CPT | Performed by: NURSE PRACTITIONER

## 2018-11-01 PROCEDURE — 96375 TX/PRO/DX INJ NEW DRUG ADDON: CPT

## 2018-11-01 PROCEDURE — 82330 ASSAY OF CALCIUM: CPT | Performed by: NURSE PRACTITIONER

## 2018-11-01 PROCEDURE — 93005 ELECTROCARDIOGRAM TRACING: CPT

## 2018-11-01 PROCEDURE — C1894 INTRO/SHEATH, NON-LASER: HCPCS | Performed by: INTERNAL MEDICINE

## 2018-11-01 PROCEDURE — 99292 CRITICAL CARE ADDL 30 MIN: CPT | Performed by: INTERNAL MEDICINE

## 2018-11-01 PROCEDURE — 85025 COMPLETE CBC W/AUTO DIFF WBC: CPT | Performed by: EMERGENCY MEDICINE

## 2018-11-01 PROCEDURE — 93306 TTE W/DOPPLER COMPLETE: CPT | Performed by: INTERNAL MEDICINE

## 2018-11-01 PROCEDURE — 36415 COLL VENOUS BLD VENIPUNCTURE: CPT | Performed by: EMERGENCY MEDICINE

## 2018-11-01 PROCEDURE — 0BH17EZ INSERTION OF ENDOTRACHEAL AIRWAY INTO TRACHEA, VIA NATURAL OR ARTIFICIAL OPENING: ICD-10-PCS | Performed by: EMERGENCY MEDICINE

## 2018-11-01 PROCEDURE — 96368 THER/DIAG CONCURRENT INF: CPT

## 2018-11-01 PROCEDURE — 5A1223Z PERFORMANCE OF CARDIAC PACING, CONTINUOUS: ICD-10-PCS | Performed by: INTERNAL MEDICINE

## 2018-11-01 PROCEDURE — 83605 ASSAY OF LACTIC ACID: CPT | Performed by: NURSE PRACTITIONER

## 2018-11-01 PROCEDURE — 82803 BLOOD GASES ANY COMBINATION: CPT

## 2018-11-01 PROCEDURE — 84443 ASSAY THYROID STIM HORMONE: CPT | Performed by: INTERNAL MEDICINE

## 2018-11-01 PROCEDURE — 33210 INSERT ELECTRD/PM CATH SNGL: CPT | Performed by: INTERNAL MEDICINE

## 2018-11-01 PROCEDURE — 99291 CRITICAL CARE FIRST HOUR: CPT

## 2018-11-01 PROCEDURE — 85610 PROTHROMBIN TIME: CPT | Performed by: EMERGENCY MEDICINE

## 2018-11-01 PROCEDURE — 82805 BLOOD GASES W/O2 SATURATION: CPT | Performed by: NURSE PRACTITIONER

## 2018-11-01 PROCEDURE — 85730 THROMBOPLASTIN TIME PARTIAL: CPT | Performed by: EMERGENCY MEDICINE

## 2018-11-01 PROCEDURE — 83605 ASSAY OF LACTIC ACID: CPT | Performed by: INTERNAL MEDICINE

## 2018-11-01 PROCEDURE — 96365 THER/PROPH/DIAG IV INF INIT: CPT

## 2018-11-01 PROCEDURE — 83880 ASSAY OF NATRIURETIC PEPTIDE: CPT | Performed by: EMERGENCY MEDICINE

## 2018-11-01 PROCEDURE — 80048 BASIC METABOLIC PNL TOTAL CA: CPT | Performed by: NURSE PRACTITIONER

## 2018-11-01 PROCEDURE — 83735 ASSAY OF MAGNESIUM: CPT | Performed by: EMERGENCY MEDICINE

## 2018-11-01 PROCEDURE — 99291 CRITICAL CARE FIRST HOUR: CPT | Performed by: INTERNAL MEDICINE

## 2018-11-01 PROCEDURE — 05H533Z INSERTION OF INFUSION DEVICE INTO RIGHT SUBCLAVIAN VEIN, PERCUTANEOUS APPROACH: ICD-10-PCS | Performed by: EMERGENCY MEDICINE

## 2018-11-01 RX ORDER — LIDOCAINE HYDROCHLORIDE 10 MG/ML
INJECTION, SOLUTION EPIDURAL; INFILTRATION; INTRACAUDAL; PERINEURAL
Status: COMPLETED
Start: 2018-11-01 | End: 2018-11-01

## 2018-11-01 RX ORDER — MIDAZOLAM HYDROCHLORIDE 1 MG/ML
1 INJECTION INTRAMUSCULAR; INTRAVENOUS ONCE
Status: COMPLETED | OUTPATIENT
Start: 2018-11-01 | End: 2018-11-01

## 2018-11-01 RX ORDER — VECURONIUM BROMIDE 1 MG/ML
10 INJECTION, POWDER, LYOPHILIZED, FOR SOLUTION INTRAVENOUS ONCE
Status: COMPLETED | OUTPATIENT
Start: 2018-11-01 | End: 2018-11-01

## 2018-11-01 RX ORDER — ACETAMINOPHEN 650 MG/1
650 SUPPOSITORY RECTAL EVERY 4 HOURS PRN
Status: DISCONTINUED | OUTPATIENT
Start: 2018-11-01 | End: 2018-11-09

## 2018-11-01 RX ORDER — LIDOCAINE HYDROCHLORIDE 10 MG/ML
INJECTION, SOLUTION INFILTRATION; PERINEURAL CODE/TRAUMA/SEDATION MEDICATION
Status: COMPLETED | OUTPATIENT
Start: 2018-11-01 | End: 2018-11-01

## 2018-11-01 RX ORDER — MIDAZOLAM HYDROCHLORIDE 1 MG/ML
INJECTION INTRAMUSCULAR; INTRAVENOUS
Status: COMPLETED
Start: 2018-11-01 | End: 2018-11-01

## 2018-11-01 RX ORDER — ONDANSETRON 2 MG/ML
INJECTION INTRAMUSCULAR; INTRAVENOUS
Status: COMPLETED
Start: 2018-11-01 | End: 2018-11-01

## 2018-11-01 RX ORDER — DOBUTAMINE HYDROCHLORIDE 200 MG/100ML
10 INJECTION INTRAVENOUS CONTINUOUS
Status: DISCONTINUED | OUTPATIENT
Start: 2018-11-01 | End: 2018-11-01

## 2018-11-01 RX ORDER — DOBUTAMINE HYDROCHLORIDE 200 MG/100ML
INJECTION INTRAVENOUS
Status: COMPLETED
Start: 2018-11-01 | End: 2018-11-01

## 2018-11-01 RX ORDER — LORAZEPAM 2 MG/ML
1 INJECTION INTRAMUSCULAR EVERY 4 HOURS PRN
Status: DISCONTINUED | OUTPATIENT
Start: 2018-11-01 | End: 2018-11-03

## 2018-11-01 RX ORDER — ONDANSETRON 2 MG/ML
4 INJECTION INTRAMUSCULAR; INTRAVENOUS ONCE
Status: COMPLETED | OUTPATIENT
Start: 2018-11-01 | End: 2018-11-01

## 2018-11-01 RX ORDER — HEPARIN SODIUM 5000 [USP'U]/ML
5000 INJECTION, SOLUTION INTRAVENOUS; SUBCUTANEOUS EVERY 8 HOURS SCHEDULED
Status: DISCONTINUED | OUTPATIENT
Start: 2018-11-01 | End: 2018-11-09

## 2018-11-01 RX ORDER — LIDOCAINE HYDROCHLORIDE 10 MG/ML
5 INJECTION, SOLUTION EPIDURAL; INFILTRATION; INTRACAUDAL; PERINEURAL ONCE
Status: COMPLETED | OUTPATIENT
Start: 2018-11-01 | End: 2018-11-01

## 2018-11-01 RX ORDER — ETOMIDATE 2 MG/ML
20 INJECTION INTRAVENOUS ONCE
Status: COMPLETED | OUTPATIENT
Start: 2018-11-01 | End: 2018-11-01

## 2018-11-01 RX ORDER — CHLORHEXIDINE GLUCONATE 0.12 MG/ML
15 RINSE ORAL EVERY 12 HOURS SCHEDULED
Status: DISCONTINUED | OUTPATIENT
Start: 2018-11-01 | End: 2018-11-01

## 2018-11-01 RX ORDER — SODIUM CHLORIDE 9 MG/ML
75 INJECTION, SOLUTION INTRAVENOUS CONTINUOUS
Status: DISCONTINUED | OUTPATIENT
Start: 2018-11-01 | End: 2018-11-03

## 2018-11-01 RX ORDER — PANTOPRAZOLE SODIUM 40 MG/1
40 INJECTION, POWDER, FOR SOLUTION INTRAVENOUS
Status: DISCONTINUED | OUTPATIENT
Start: 2018-11-01 | End: 2018-11-07

## 2018-11-01 RX ORDER — SODIUM CHLORIDE 9 MG/ML
100 INJECTION, SOLUTION INTRAVENOUS CONTINUOUS
Status: DISPENSED | OUTPATIENT
Start: 2018-11-01 | End: 2018-11-01

## 2018-11-01 RX ORDER — CHLORHEXIDINE GLUCONATE 0.12 MG/ML
15 RINSE ORAL EVERY 12 HOURS SCHEDULED
Status: DISCONTINUED | OUTPATIENT
Start: 2018-11-01 | End: 2018-11-04

## 2018-11-01 RX ADMIN — HEPARIN SODIUM 5000 UNITS: 5000 INJECTION INTRAVENOUS; SUBCUTANEOUS at 21:15

## 2018-11-01 RX ADMIN — ACETAMINOPHEN 650 MG: 650 SUPPOSITORY RECTAL at 21:53

## 2018-11-01 RX ADMIN — LORAZEPAM 1 MG: 2 INJECTION INTRAMUSCULAR; INTRAVENOUS at 22:05

## 2018-11-01 RX ADMIN — Medication 150 ML/HR: at 17:24

## 2018-11-01 RX ADMIN — HEPARIN SODIUM 5000 UNITS: 5000 INJECTION INTRAVENOUS; SUBCUTANEOUS at 17:25

## 2018-11-01 RX ADMIN — Medication 50 MEQ: at 17:24

## 2018-11-01 RX ADMIN — ETOMIDATE 20 MG: 2 INJECTION, SOLUTION INTRAVENOUS at 13:21

## 2018-11-01 RX ADMIN — LIDOCAINE HYDROCHLORIDE 5 ML: 10 INJECTION, SOLUTION EPIDURAL; INFILTRATION; INTRACAUDAL; PERINEURAL at 12:41

## 2018-11-01 RX ADMIN — SODIUM CHLORIDE 1000 ML: 0.9 INJECTION, SOLUTION INTRAVENOUS at 16:45

## 2018-11-01 RX ADMIN — Medication 100 MEQ: at 16:38

## 2018-11-01 RX ADMIN — SODIUM BICARBONATE 50 MEQ: 84 INJECTION, SOLUTION INTRAVENOUS at 19:00

## 2018-11-01 RX ADMIN — VECURONIUM BROMIDE 10 MG: 1 INJECTION, POWDER, LYOPHILIZED, FOR SOLUTION INTRAVENOUS at 13:22

## 2018-11-01 RX ADMIN — SODIUM CHLORIDE 125 ML/HR: 0.9 INJECTION, SOLUTION INTRAVENOUS at 17:12

## 2018-11-01 RX ADMIN — EPINEPHRINE 10 MCG/MIN: 1 INJECTION, SOLUTION, CONCENTRATE INTRAVENOUS at 13:58

## 2018-11-01 RX ADMIN — SODIUM BICARBONATE 50 MEQ: 84 INJECTION, SOLUTION INTRAVENOUS at 17:24

## 2018-11-01 RX ADMIN — EPINEPHRINE 20 MCG/MIN: 1 INJECTION, SOLUTION, CONCENTRATE INTRAVENOUS at 19:32

## 2018-11-01 RX ADMIN — Medication 50 MEQ: at 19:00

## 2018-11-01 RX ADMIN — SODIUM BICARBONATE 100 MEQ: 84 INJECTION, SOLUTION INTRAVENOUS at 16:38

## 2018-11-01 RX ADMIN — LIDOCAINE HYDROCHLORIDE 10 ML: 10 INJECTION, SOLUTION INFILTRATION; PERINEURAL at 14:25

## 2018-11-01 RX ADMIN — EPINEPHRINE 20 MCG/MIN: 1 INJECTION, SOLUTION, CONCENTRATE INTRAVENOUS at 21:52

## 2018-11-01 RX ADMIN — PANTOPRAZOLE SODIUM 40 MG: 40 INJECTION, POWDER, FOR SOLUTION INTRAVENOUS at 17:25

## 2018-11-01 RX ADMIN — DOBUTAMINE IN DEXTROSE: 200 INJECTION, SOLUTION INTRAVENOUS at 13:26

## 2018-11-01 RX ADMIN — MIDAZOLAM 1 MG: 1 INJECTION INTRAMUSCULAR; INTRAVENOUS at 13:07

## 2018-11-01 RX ADMIN — DOBUTAMINE HYDROCHLORIDE: 200 INJECTION INTRAVENOUS at 13:26

## 2018-11-01 RX ADMIN — MIDAZOLAM 1 MG: 1 INJECTION INTRAMUSCULAR; INTRAVENOUS at 13:15

## 2018-11-01 RX ADMIN — EPINEPHRINE 17 MCG/MIN: 1 INJECTION, SOLUTION, CONCENTRATE INTRAVENOUS at 17:11

## 2018-11-01 RX ADMIN — ONDANSETRON HYDROCHLORIDE 4 MG: 2 INJECTION, SOLUTION INTRAVENOUS at 13:00

## 2018-11-01 RX ADMIN — CHLORHEXIDINE GLUCONATE 0.12% ORAL RINSE 15 ML: 1.2 LIQUID ORAL at 20:16

## 2018-11-01 RX ADMIN — VASOPRESSIN 0.03 UNITS/MIN: 20 INJECTION INTRAVENOUS at 19:29

## 2018-11-01 RX ADMIN — ONDANSETRON 4 MG: 2 INJECTION INTRAMUSCULAR; INTRAVENOUS at 13:00

## 2018-11-01 RX ADMIN — MIDAZOLAM HYDROCHLORIDE 1 MG: 1 INJECTION, SOLUTION INTRAMUSCULAR; INTRAVENOUS at 12:18

## 2018-11-01 RX ADMIN — SODIUM CHLORIDE 125 ML/HR: 0.9 INJECTION, SOLUTION INTRAVENOUS at 16:03

## 2018-11-01 RX ADMIN — SODIUM CHLORIDE 9 UNITS/HR: 9 INJECTION, SOLUTION INTRAVENOUS at 18:31

## 2018-11-01 NOTE — ED PROVIDER NOTES
History  Chief Complaint   Patient presents with    Heart Problem     feeling weak for past 2 weeks  Pt comes in with EMS in third degree heart block     69 YO male presents via EMS  Pt had been generally weak for the last few days  Wife states he had worsening weakness today prompting a call to EMS  On EMS arrival the pt was found to have a third degree heart block with a  LBBB  He was given 0 5mg of atropine which did not improve his heart rate  He was then give fentanyl and midazolam and started on transcutaneous pacing  Pt arrived to the ED somnolent but responsive, following commands and oriented  Feeling pulse, appeared the pacer was not capturing as the pt continued to have bradycardia and somnolence and weakness did not improve  Pt was having discomfort with the pacing but denied chest pain  Cardiology was called for evaluation  Pt never had similar symptoms in the past  Because transcutaneous pacing was not successful it was determined appropriate for access to be obtained for transvenous pacing  Pt had an internal jugular venous line placed on the Right by emergency physician and pacer was attempted to be floated, first with ED PA and then by cardiology, neither of these attempts were successful  Decision was made to have a pacemaker placed in the cath lab but team was not at campus  Transcutaneous pacer was continued using 200mA, these did seem to capture frequently  During the wait for the cath lab the pt did have 2 episodes of PEA arrest, there would be loss of capture on the monitor and the pt would become bradycardic, and pulses would be lost  Each time 1mg of epineprhine was given IV and compressions were started and each time the pt had ROSC after less than 1 minutes  During this time the pt's mental status declined and he required intubation  On cath team arrival the pt was transported for placement of a temporary transvenous pacemaker   At time of transfer of pt care he was mildly hypotensive, GCS 3, intubated  History provided by:  Patient and spouse  History limited by:  Mental status change   used: No    Heart Problem   Location:  AV node  Quality:  Total dissociation   Severity:  Severe  Onset quality:  Unable to specify  Timing:  Constant  Progression:  Worsening  Context:  Weak, found to have complete heart block  Associated symptoms: fatigue and shortness of breath    Associated symptoms: no abdominal pain, no fever, no nausea, no rash and no vomiting    Fatigue:     Severity:  Moderate    Timing:  Constant    Progression:  Worsening  Shortness of breath:     Severity:  Mild    Onset quality:  Gradual    Timing:  Constant    Progression:  Waxing and waning      None       Past Medical History:   Diagnosis Date    [facility] [address] [facility]     [facility]     Eczema     Erectile dysfunction     Gout     Hyperlipidemia     Hypertension     Murmur     Nephropathy     Osteoarthritis     [facility]     Vertigo        Past Surgical History:   Procedure Laterality Date    CARPAL TUNNEL RELEASE      KNEE SURGERY      SHOULDER SURGERY         Family History   Problem Relation Age of Onset    Heart disease Father      I have reviewed and agree with the history as documented  Social History   Substance Use Topics    Smoking status: Former Smoker     Packs/day: 1 00     Years: 30 00    Smokeless tobacco: Not on file      Comment: quit 10 years ago    Alcohol use No        Review of Systems   Constitutional: Positive for activity change and fatigue  Negative for fever  HENT: Negative for dental problem  Eyes: Negative for visual disturbance  Respiratory: Positive for shortness of breath  Cardiovascular: Negative for palpitations  Gastrointestinal: Negative for abdominal pain, nausea and vomiting  Genitourinary: Negative for dysuria and frequency  Musculoskeletal: Negative for neck pain and neck stiffness  Skin: Negative for rash  Neurological: Positive for weakness  Negative for dizziness and light-headedness  Psychiatric/Behavioral: Negative for agitation, behavioral problems and confusion  All other systems reviewed and are negative  Physical Exam  Physical Exam   Constitutional: He is oriented to person, place, and time  He appears well-developed and well-nourished  HENT:   Head: Normocephalic and atraumatic  Eyes: Pupils are equal, round, and reactive to light  EOM are normal    Neck: Normal range of motion  Cardiovascular: Normal heart sounds  Bradycardia present  Pulmonary/Chest: Effort normal and breath sounds normal    Abdominal: Soft  Musculoskeletal: Normal range of motion  Neurological: He is alert and oriented to person, place, and time  Holding eyes closed but responding and appropriate  Skin: Skin is warm and dry  Psychiatric: He has a normal mood and affect  His behavior is normal    Nursing note and vitals reviewed        Vital Signs  ED Triage Vitals   Temperature Pulse Respirations Blood Pressure SpO2   11/01/18 1528 11/01/18 1215 11/01/18 1215 11/01/18 1215 11/01/18 1215   (!) 96 9 °F (36 1 °C) (!) 30 (!) 31 129/71 94 %      Temp Source Heart Rate Source Patient Position - Orthostatic VS BP Location FiO2 (%)   11/01/18 1528 11/01/18 1233 11/01/18 1236 11/01/18 1236 --   Temporal Monitor Lying Right arm       Pain Score       11/01/18 1549       No Pain           Vitals:    11/01/18 1354 11/01/18 1357 11/01/18 1401 11/01/18 1403   BP: 98/51 106/51 (!) 102/49 (!) 108/46   Pulse: 79 80 80 80   Patient Position - Orthostatic VS: Lying Lying Lying Lying       Visual Acuity      ED Medications  Medications   EPINEPHrine 3000 mcg (STANDARD CONCENTRATION) IV in sodium chloride 0 9% 250 mL (17 mcg/min Intravenous New Bag 11/1/18 1711)   sodium chloride 0 9 % infusion (100 mL/hr Intravenous Not Given 11/1/18 1603)   sodium chloride 0 9 % infusion (125 mL/hr Intravenous New Bag 11/1/18 1712)   heparin (porcine) subcutaneous injection 5,000 Units (5,000 Units Subcutaneous Given 11/1/18 1725)   chlorhexidine (PERIDEX) 0 12 % oral rinse 15 mL (not administered)   pantoprazole (PROTONIX) injection 40 mg (40 mg Intravenous Given 11/1/18 1725)   sodium bicarbonate 150 mEq in dextrose 5 % 1,000 mL infusion (150 mL/hr Intravenous New Bag 11/1/18 1724)   insulin regular (HumuLIN R,NovoLIN R) 1 Units/mL in sodium chloride 0 9 % 100 mL infusion (not administered)   midazolam (VERSED) 5 mg/5 mL injection **ADS Override Pull** (1 mg  Given 11/1/18 1218)    EMS REPLENISHMENT MED ( Does not apply Given to EMS 11/1/18 1455)   DOBUTamine in dextrose 5% (DOBUTREX) 500 mg in 250 mL infusion (premix) **ADS Override Pull** (  Rate/Dose Change 11/1/18 1350)   lidocaine (XYLOCAINE) 1 % injection (10 mL Infiltration Given 11/1/18 1425)   lidocaine (PF) (XYLOCAINE-MPF) 1 % injection 5 mL (5 mL Infiltration Given 11/1/18 1241)   ondansetron (ZOFRAN) injection 4 mg (4 mg Intravenous Given 11/1/18 1300)   midazolam (VERSED) injection 1 mg (1 mg Intravenous Given 11/1/18 1315)   midazolam (VERSED) injection 1 mg (1 mg Intravenous Given 11/1/18 1307)   etomidate (AMIDATE) 2 mg/mL injection 20 mg (20 mg Intravenous Given 11/1/18 1321)   vecuronium (NORCURON) injection 10 mg (10 mg Intravenous Given 11/1/18 1322)   sodium chloride 0 9 % bolus 1,000 mL (0 mL Intravenous Stopped 11/1/18 1724)   sodium bicarbonate 8 4 % injection 100 mEq (100 mEq Intravenous Given 11/1/18 1638)   sodium bicarbonate 8 4 % injection 50 mEq (50 mEq Intravenous Given 11/1/18 1724)       Diagnostic Studies  Results Reviewed     Procedure Component Value Units Date/Time    Comprehensive metabolic panel [[id number]]  (Abnormal) Collected:  11/01/18 1215    Lab Status:  Final result Specimen:  Blood from Arm, Right Updated:  11/01/18 1257     Sodium 137 mmol/L      Potassium 5 2 mmol/L      Chloride 106 mmol/L      CO2 18 (L) mmol/L      ANION GAP 13 mmol/L      BUN 37 (H) mg/dL      Creatinine 1 99 (H) mg/dL      Glucose 318 (H) mg/dL      Calcium 8 8 mg/dL      AST 28 U/L      ALT 33 U/L      Alkaline Phosphatase 85 U/L      Total Protein 6 9 g/dL      Albumin 2 6 (L) g/dL      Total Bilirubin 0 63 mg/dL      eGFR 34 ml/min/1 73sq m     Narrative:         National Kidney Disease Education Program recommendations are as follows:  GFR calculation is accurate only with a steady state creatinine  Chronic Kidney disease less than 60 ml/min/1 73 sq  meters  Kidney failure less than 15 ml/min/1 73 sq  meters      B-type natriuretic peptide [[id number]]  (Abnormal) Collected:  11/01/18 1215    Lab Status:  Final result Specimen:  Blood from Arm, Right Updated:  11/01/18 1257     NT-proBNP 949 (H) pg/mL     Magnesium [[id number]]  (Normal) Collected:  11/01/18 1215    Lab Status:  Final result Specimen:  Blood from Arm, Right Updated:  11/01/18 1257     Magnesium 2 3 mg/dL     Troponin I [[id number]]  (Normal) Collected:  11/01/18 1215    Lab Status:  Final result Specimen:  Blood from Arm, Right Updated:  11/01/18 1253     Troponin I 0 02 ng/mL     APTT [[id number]]  (Normal) Collected:  11/01/18 1215    Lab Status:  Final result Specimen:  Blood from Arm, Right Updated:  11/01/18 1241     PTT 29 seconds     Protime-INR [[id number]]  (Normal) Collected:  11/01/18 1215    Lab Status:  Final result Specimen:  Blood from Arm, Right Updated:  11/01/18 1241     Protime 14 1 seconds      INR 1 08    CBC and differential [[id number]]  (Abnormal) Collected:  11/01/18 1215    Lab Status:  Final result Specimen:  Blood from Arm, Right Updated:  11/01/18 1229     WBC 20 67 (H) Thousand/uL      RBC 3 74 (L) Million/uL      Hemoglobin 11 4 (L) g/dL      Hematocrit 35 6 (L) %      MCV 95 fL      MCH 30 5 pg      MCHC 32 0 g/dL      RDW 14 5 %      MPV 9 2 fL      Platelets 445 (H) Thousands/uL      nRBC 0 /100 WBCs      Neutrophils Relative 79 (H) %      Immat GRANS % 1 %      Lymphocytes Relative 13 (L) %      Monocytes Relative 4 %      Eosinophils Relative 2 %      Basophils Relative 1 %      Neutrophils Absolute 16 34 (H) Thousands/µL      Immature Grans Absolute 0 18 Thousand/uL      Lymphocytes Absolute 2 74 Thousands/µL      Monocytes Absolute 0 82 Thousand/µL      Eosinophils Absolute 0 46 Thousand/µL      Basophils Absolute 0 13 (H) Thousands/µL                  XR chest portable   Final Result by Xiomy Conrad DO (11/01 1732)      Lines and tubes as above  No pneumothorax  Persistent pulmonary edema with bibasilar pleural effusions and volume loss versus infiltrates  Workstation performed: [id number]         X-ray chest 1 view portable   Final Result by Andrew Rhodes DO (11/01 1420)   Mild cardiomegaly with Pulmonary edema/bilateral infiltrates  Workstation performed: [id number]         XR chest 1 view portable   Final Result by Andrew Rhodes DO (11/01 1412)   Satisfactory endotracheal tube location  Improving pulmonary edema              Workstation performed: [id number]                    Procedures  ECG 12 Lead Documentation  Date/Time: 11/1/2018 3:08 PM  Performed by: Edinson [place] by: Ria Boxer E     ECG reviewed by me, the ED Provider: yes    Patient location:  ED  Interpretation:     Interpretation: abnormal    Rate:     ECG rate:  28    ECG rate assessment: bradycardic    Rhythm:     Rhythm: other rhythm    QRS:     QRS axis:  Normal    QRS intervals:  Normal  Conduction:     Conduction: abnormal      Abnormal conduction: complete LBBB and 3rd degree    ST segments:     ST segments:  Normal  T waves:     T waves: normal    Central Line  Date/Time: 11/1/2018 3:08 PM  Performed by: Lashawn Yeboah  Authorized by: Ria Boxer E     Patient location:  ED  Consent:     Consent obtained:  Emergent situation    Risks discussed:  Arterial puncture, nerve damage, pneumothorax, infection and bleeding  Universal protocol:     Patient identity confirmed:  Verbally with patient  Pre-procedure details:     Skin preparation:  ChloraPrep    Skin preparation agent: Skin preparation agent completely dried prior to procedure    Indications:     Central line indications: other (comment)      Central line indications comment:  Transvenous pacemaker placement  Anesthesia (see MAR for exact dosages): Anesthesia method:  Local infiltration    Local anesthetic:  Lidocaine 1% w/o epi  Procedure details:     Location:  Right internal jugular    Vessel type: vein      Laterality:  Right    Approach: percutaneous technique used      Patient position:  Flat    Procedural supplies: 6 [place] single lumen with introducer port  Catheter size:  6 Fr    Landmarks identified: yes      Ultrasound guidance: yes      Sterile ultrasound techniques: Sterile gel and sterile probe covers were used      Number of attempts:  2    Successful placement: yes    Post-procedure details:     Post-procedure:  Dressing applied    Post-procedure complications: none      Patient tolerance of procedure: Tolerated well, no immediate complications  Intubation  Date/Time: 11/1/2018 3:10 PM  Performed by: Paz Krishnan [address] [place] [address] by: Fito VERGARA     Patient location:  ED  Consent:     Consent obtained:  Emergent situation  Universal protocol:     Patient identity confirmed:  Arm band  Pre-procedure details:     Patient status:  Unresponsive    Mallampati score:  3    Pretreatment medications:  Etomidate    Paralytics:  Vecuronium  Indications:     Indications for intubation: respiratory failure and hypoxemia    Procedure details:     Preoxygenation:  Nonrebreather mask    CPR in progress: yes      Intubation method:  Oral    Oral intubation technique:  Glidescope    Laryngoscope blade:   Mac 3    Tube size (mm):  7 5    Tube type:  Cuffed    Number of attempts:  1    Tube visualized through cords: yes    Placement assessment:     ETT to lip:  24    Tube secured with:  ETT walters    Breath sounds:  Equal    Placement verification: chest rise      CXR findings:  ETT in proper place  Post-procedure details:     Patient tolerance of procedure: Tolerated well, no immediate complications  CriticalCare Time  Performed by: Bisi Kumar  Authorized by: Ronen VERGARA     Critical care provider statement:     Critical care time (minutes):  80    Critical care time was exclusive of:  Separately billable procedures and treating other patients and teaching time    Critical care was necessary to treat or prevent imminent or life-threatening deterioration of the following conditions:  Cardiac failure, circulatory failure, renal failure, respiratory failure, shock and CNS failure or compromise    Critical care was time spent personally by me on the following activities:  Blood draw for specimens, obtaining history from patient or surrogate, development of treatment plan with patient or surrogate, discussions with consultants, evaluation of patient's response to treatment, examination of patient, interpretation of cardiac output measurements, ordering and performing treatments and interventions, ordering and review of laboratory studies, ordering and review of radiographic studies, re-evaluation of patient's condition and review of old charts           Phone Contacts  ED Phone Contact    ED Course                               MDM  Number of Diagnoses or Management Options  Acute kidney injury [facility]): new and requires workup  Cardiac arrest [facility]): new and requires workup  Hypotension: new and requires workup  Respiratory arrest [facility]): new and requires workup  Third degree heart block [facility]): new and requires workup  Diagnosis management comments: 1  3rd degree heart block - Pt with decreased responsiveness, appropriate answers  Will speak with cardiology, pt requires an emergent pacemaker          Amount and/or Complexity of Data Reviewed  Clinical lab tests: ordered and reviewed  Tests in the radiology section of CPT®: reviewed and ordered  Obtain history from someone other than the patient: yes  Review and summarize past medical records: yes  Discuss the patient with other providers: yes  Independent visualization of images, tracings, or specimens: yes    Patient Progress  Patient progress: (Critical  )    CritCare Time    Disposition  Final diagnoses:   Hypotension   Third degree heart block ([facility] )   Respiratory arrest ([facility] )   Cardiac arrest ([facility] )   Acute kidney injury ([facility] )     Time reflects when diagnosis was documented in both MDM as applicable and the Disposition within this note     Time User Action Codes Description Comment    11/1/2018  2:39 PM Arva Campos E Add [I95 9] Hypotension     11/1/2018  2:39 PM Arva Campos E Add [I44 2] Third degree heart block ([facility] )     11/1/2018  2:39 PM Arva Campos E Add [R09 2] Respiratory arrest ([facility] )     11/1/2018  2:39 PM Arva Campos E Add [I46 9] Cardiac arrest ([facility] )     11/1/2018  6:18 PM Arva Campos E Add [N17 9] Acute kidney injury [facility])       ED Disposition     ED Disposition Condition Comment    Admit  Case was discussed with Dr Jeremy Souza and the patient's admission status was agreed to be Admission Status: inpatient status to the service of Dr Judith Brush   Follow-up Information    None         There are no discharge medications for this patient  No discharge procedures on file      ED Provider  Electronically Signed by           Katina Garcia MD  11/01/18 4911

## 2018-11-01 NOTE — PROCEDURES
Procedures    Procedure: Transvenous temporary pacemaker  Arterial sheath placement for hemodynamic monitoring  Attending: Norina Kanner, MD  Fellow: David Escobar MD    Indication: Complete heart block with hemodynamic compromise    Procedure:  Under sterile conditions, right groin was prepped and anesthesized with local lidocaine  Initial access was arterial and a 4 [place] arterial sheath was placed  Access was obtained to the femoral vein via seldinger technique  Pacing lead was advanced under fluoroscopic guidance to the RV  Good capture was obtained and pacer set at 80 bpm  Pacing lead was at 67 cm and sheath was sutured in place  Femoral arterial sheath was kept in place for hemodynamic monitoring  Access:   Right femoral vein access  Right femoral arterial access  Sheath in place for hemodynamic monitoring  Complications: None    Impression: Complete heart block status post transvenous pacemaker  Plan:   Continue supportive treatment and monitoring in ICU  NPO after midnight  Permanent pacemaker planned for tomorrow     Discussed with primary cardiology team

## 2018-11-01 NOTE — ED NOTES
Pulses lost at 1340, CPR started  CPR commensed for approx 30 sec  Cardiac activity at this time with ultra sound  Pulses back at 1341   Ultrasound used by [place], PA-C to confirm cardiac activity          Nay Leo RN  11/01/18 [id number]

## 2018-11-01 NOTE — ED NOTES
Pt going in and out of pulselessness   Dr Ingram Breaker notified     Tila Luo, MARGAUX  11/01/18 1963

## 2018-11-01 NOTE — H&P
History & Physical Exam - Critical Care   Jes Yoder 70 y o  male MRN: [id number]  Unit/Bed#: ICU 03 Encounter: [id number]      Assessment/Plan:  1  Complete Heart Block with LBBB and profound bradycardia  · Will admit to critical care service for closer monitoring, will require great than 2 midnight hospitalization stay  · Transvenous pacemaker placed in cath lab  · Plan for PPM placement once stabilized   · Stat echocardiogram due to hypotension in the unit- EF appears to be normal  · Troponin 0 02 upon admission  · Cardiology consulted   2  Acute Hypoxic Respiratory Failure  · Secondary to complete heart block and profound bradycardia and cardiac arrest for airway protection  · Continue mechanical ventilation and wean as tolerated  3  Acute Metabolic Acidosis  · Ph 6 9, no signs of cardiogenic shock, EF normal on echocardiogram, CVP 15  · Hypotensive despite epinephrine infusion 20 mcg/min  · Dobutamine on hold  · Give two amps of bicarb now followed by bicarb infusion  · Repeat ABG   · Keep map >65  4  Acute Kidney Injury  · Baseline creatinine 1 1-1 2  · Does follow with nephrology as an outpatient  · Likely secondary to hypoperfusion  · Fluid resuscitation  · trend BMPS  · Strict I/O, daily weights  · If urine output does not improve may need CVVH  5  Diabetes Mellitus II  · A1C 6 7 10/12  · Start insulin gtt now, goal -180  7  Hypertension  · Currently hypotensive on vasopressors      Critical Care Time: 41  Documented critical care time excludes any procedures documented elsewhere  It also excludes any family updates    _____________________________________________________________________      HPI:    Jes Yoder is a 70 y o    male with a past medical history significant for hypertension, hyperlipidemia, peripheral arterial disease, gout, diabetes mellitus II, who presented to [facility] ER today via EMS for chief complaint of worsening dizziness and fatigue, generally feeling unwell, and unable to tolerate any activity  Wife provides history  When EMS arrived he was found to have profound bradycardia with heart rate in 20s, altered mental status, complete heart block and a LBBB  Upon arrival to the ER, attempts to place a transvenous pacemaker at the bedside were unsuccessful in achieving capture  He had 2 cardiac arrests that were approximately 30 seconds with ROSC  The patient was intubated and taken to the cath lab for an emergent transvenous pacemaker placement  Arrived to the intensive care unit hypotensive on Epinephrine and Dobutamine  Of note, the patient was recently seen by his primary care provider on 10/29/18 for intermittent dizziness and vertigo  He was started on meclizine for dizziness and amoxicillin for sinus infection  Review of Systems:  Review of Systems   Unable to perform ROS: Intubated     Full 14 point review of systems was performed  Aside from what was mentioned in the HPI, it is otherwise negative        Historical Information   Past Medical History:   Diagnosis Date    [facility] [address] [facility]     Diabetic foot ulcer [facility] [address] [facility]     Eczema     Erectile dysfunction     Gout     Hyperlipidemia     Hypertension     Murmur     Nephropathy     Osteoarthritis     PAD (peripheral artery disease) (HCC)     Vertigo      Past Surgical History:   Procedure Laterality Date    CARPAL TUNNEL RELEASE      KNEE SURGERY      SHOULDER SURGERY       Social History   History   Alcohol Use No     History   Drug Use No     History   Smoking Status    Former Smoker    Packs/day: 1 00    Years: 30 00   Smokeless Tobacco    Not on file     Comment: quit 10 years ago       Family History:   Family History   Problem Relation Age of Onset    Heart disease Father        Medications:  Pertinent medications were reviewed    Current Facility-Administered Medications:  chlorhexidine 15 mL Swish & Spit Q12H [address] PAT Olsen    DOBUTamine in dextrose 5% 10 mcg/kg/min Intravenous Continuous Adriane Panchal MD Last Rate: 10 mcg/kg/min (11/01/18 1326)   epinephrine 1-20 mcg/min Intravenous Titrated Adriane Panchal MD Last Rate: 10 mcg/min (11/01/18 1358)   heparin (porcine) 5,000 Units Subcutaneous Q8H [address] Genesis K Bilofsky, CRNP    pantoprazole 40 mg Intravenous Q24H [address] Genesis K BilofODALIS mcneilNP    sodium chloride 100 mL/hr Intravenous Continuous Sachin Younger MD    sodium chloride 125 mL/hr Intravenous Continuous Genesis K Bilofsky, CRNP          Allergies   Allergen Reactions    Invokana [Canagliflozin]     Lamisil [Terbinafine] Blisters     Wife states " His skin peeled from head to toe"    Latex     Other Rash     palmegranate         Vitals:   BP (!) 108/46 (BP Location: Right arm)   Pulse 80   Temp (!) 96 9 °F (36 1 °C) (Temporal)   Resp 14   Ht 5' 10" (1 778 m)   Wt 109 kg (240 lb 4 8 oz)   SpO2 98%   BMI 34 48 kg/m²   Body mass index is 34 48 kg/m²  SpO2: 98 %,   SpO2 Activity: At Rest,   O2 Device:  (intubation)    No intake or output data in the 24 hours ending 11/01/18 1558  Invasive Devices     Central Venous Catheter Line            CVC Central Lines 11/01/18 Right Internal jugular less than 1 day          Peripheral Intravenous Line            Peripheral IV Right Antecubital -- days    Peripheral IV 11/01/18 Left Antecubital less than 1 day          Line            Arterial Sheath less than 1 day    Arterial Sheath Other (Comment) Right Femoral less than 1 day    Pacer Wires less than 1 day    Venous Sheath 6 Fr  Right Femoral less than 1 day          Drain            NG/OG/Enteral Tube Orogastric 18 Fr Center mouth less than 1 day    Urethral Catheter Other (Comment) less than 1 day          Airway            ETT  7 5 mm less than 1 day                Physical Exam:  Gen: Intubated, sedated  HEENT: EOMs intact, normocephalic, atraumatic, O/P clear, PERRL, ETT present  Neck/lymph: supple, midline   No adenopathy, no jvd  Chest: Lung sounds diminished bilaterally  Cor: S1, S2, + murmur  No rubs, gallops  Abd: Obese, soft  + bowel sounds all quadrants  Ext: Nonpitting edema to bilateral lower extremities  S/p 1st and 2nd toe amputation  Neuro: GCS 4T   Skin: Pale  No bruising, bilateral leg erythema with scabs       Diagnostic Data:  Lab: I have personally reviewed pertinent lab results  ,   CBC:    Results from last 7 days  Lab Units 11/01/18  1215   WBC Thousand/uL 20 67*   HEMOGLOBIN g/dL 11 4*   HEMATOCRIT % 35 6*   PLATELETS Thousands/uL 397*      CMP: Lab Results   Component Value Date    K 5 2 11/01/2018     11/01/2018    CO2 18 (L) 11/01/2018    BUN 37 (H) 11/01/2018    CREATININE 1 99 (H) 11/01/2018    CALCIUM 8 8 11/01/2018    AST 28 11/01/2018    ALT 33 11/01/2018    ALKPHOS 85 11/01/2018    EGFR 34 11/01/2018   ,   PT/INR:   Lab Results   Component Value Date    INR 1 08 11/01/2018   ,   Troponin:   Lab Results   Component Value Date    TROPONINI 0 02 11/01/2018   ,   Magnesium: No components found for: MAG,  Phosphorous: No results found for: PHOS    ABG: No results found for: PHART, WIH1AOY, PO2ART, ZQH5EKL, F5UDURFG, BEART, SOURCE,     Microbiology:      Imaging: I have personally reviewed the pertinent imaging studies on the PACS system  Chest x-ray: Satisfactory endotracheal tube location  Improving pulmonary edema  No pneumothorax  EKG/telemetry/Echo:   100% V-paced      VTE Prophylaxis: Heparin    Code Status: Level 1 - Full Code    Portions of the record may have been created with voice recognition software  Occasional wrong word or "sound a like" substitutions may have occurred due to the inherent limitations of voice recognition software  Read the chart carefully and recognize, using context, where substitutions have occurred

## 2018-11-01 NOTE — ED NOTES
Cardiology at [facility] Iqra, RN  11/01/18 [facility] Christine Adam  11/01/18 9378

## 2018-11-01 NOTE — ED NOTES
Pulse check at 1321 and cardiac ultrasound used by MOE Chase  No cardiac activity  CPR restarted at 1322  Pulse check at 1323  Pulses back at this time  Pt paced at 80 at 1324  X ray to confirm tube placement at 1332        Yudi Rosario RN  11/01/18 7960

## 2018-11-01 NOTE — ED NOTES
Pace set to rate of 80 and 100J at this time        Annabella Roe RN  11/01/18 [id number] Zach Deluca RN  11/01/18 2403

## 2018-11-01 NOTE — ED NOTES
Pt arrived in ED alert, awake, transcutaneously paced via EMS          Iker Garcia RN  11/01/18 [facility] Christine Pittman RN  11/01/18 9716

## 2018-11-01 NOTE — ED NOTES
Patients wife [place] in ED family waiting room  Primary nurse BEAN Beavers, notified       Joao Gill RN  11/01/18 0299

## 2018-11-01 NOTE — TELEPHONE ENCOUNTER
Basim Coughlin called again to let you know that she is taking him to the ER  He could not get out of bed, and asked her to call [phone]

## 2018-11-01 NOTE — ED NOTES
Pulse check  No pulse at this time for approx 10 sec before pt put back on transcutaneouspacer at [address]   Pulse present at this time     Sunday Navarro RN  11/01/18 [address] [facility] Joan Alcantara RN  11/01/18 5540

## 2018-11-01 NOTE — CONSULTS
Consult - Cardiology   Julius Ainsley 70 y o  male MRN: [id number]  Unit/Bed#: ICU 03 Encounter: [id number]             Reason For Consult: Dizziness, bradycardia    History Of Present Illness: The patient is a 70 yr old WM who presents with a few weeks of dizziness  He was being worked up for vertigo apparently  His symptoms persisted and he became profoundly weak today and lightheaded and summoned  EMS and was found to have profound bradycardia with CHB  His effective HR was in the 20s with low BP and change of mentation  He was seen by me in 2015 to check his cardiac status  He had a normal EF with LVH  He had some edema at that time but no signs of profound bradycardia, congestive heart failure for ischemic heart symptoms        Past Medical History:   DM  HTN  Hyperlipidemia  Peripheral vascular disease with 2 digits amputated from his feet  Gout  Obesity  Chronic LE edema  PVCS  Arthroscopic surgeries    Allergy:  Lamisil, latex      Medications:  Metoprolol 100 mg daily  Glenard [place]  Glimepiride  Metformin  Xultrophy  Losartan 100 mg daily  Multi vitamin  Aspirin 81 mg daily  Vitamin-D  Hydroxyzine  Furosemide 40 mg daily  Amlodipine 10 mg daily  Allopurinol 300 mg daily  Chlorthalidone 25 mg daily  Simvastatin      Family History:  Not obtainable    Social History:  Not obtainable      ROS:  Not obtainable      Exam: HR 28    BP not palpable  General: agitated  obese  Head: Normocephalic, atraumatic  Eyes:  No Icterus  Normal Conjunctiva  Oropharynx: normal-appearing mucosa and no pharyngitis, no exudate  Neck: supple, symmetrical, trachea midline, thyroid: not enlarged, symmetric, no tenderness/mass/nodules, no carotid bruit and no JVD   Heart: RRR, grade 1-2 systolic murmur at the base    No diastolic murmur rub or gallop  Lungs:  Decreased breaths sounds throughout without audible wheezing rhonchi rales  Abdomen: obese, no mass organomegaly  Lower Limbs:  1+ pedal edema with absent distal pulses    Monitor shows profound sinus bradycardia with likely AV dissociation  ProBNP 949  Troponin 0 02  Hemoglobin 11 4, white blood count 20 67, platelets [id number]  BUN 37, creatinine 1 99, potassium 5 2     ASSESSMENT AND PLAN:   1  Complete heart block  Patient quite symptomatic  Attempts at placing transvenous pacer at bedside were unsuccessful to achieve capture  Cath lab called in urgently  Will rule out provoking causes of heart block including acute MI  Echocardiogram as been ordered  2  Acute renal failure  Likely due to hypoperfusion  3  Hypertension  Currently hypotensive and hopefully will correct with correction of heart rate  4  Diabetes mellitus  5  Hyperlipidemia-on statin therapy  6  Lower extremity edema-well controlled with furosemide        Will follow with you and advise further  Hold metoprolol  Unless acute cause for this scenario plan for permanent pacemaker tomorrow      Concetta Mayer MD

## 2018-11-01 NOTE — ED NOTES
Cardiac pacing started at this time     15 LPM oxygen via non-rebreather mask administered at this time     John Novoa RN  11/01/18 3266

## 2018-11-02 LAB
AMORPH URATE CRY URNS QL MICRO: ABNORMAL /HPF
ANION GAP SERPL CALCULATED.3IONS-SCNC: 13 MMOL/L (ref 4–13)
ATRIAL RATE: 89 BPM
BACTERIA UR QL AUTO: ABNORMAL /HPF
BASE EXCESS BLDA CALC-SCNC: -4.2 MMOL/L
BASOPHILS # BLD AUTO: 0.03 THOUSANDS/ΜL (ref 0–0.1)
BASOPHILS NFR BLD AUTO: 0 % (ref 0–1)
BILIRUB UR QL STRIP: ABNORMAL
BUN SERPL-MCNC: 43 MG/DL (ref 5–25)
CA-I BLD-SCNC: 0.98 MMOL/L (ref 1.12–1.32)
CALCIUM SERPL-MCNC: 7.7 MG/DL (ref 8.3–10.1)
CHLORIDE SERPL-SCNC: 107 MMOL/L (ref 100–108)
CLARITY UR: ABNORMAL
CO2 SERPL-SCNC: 23 MMOL/L (ref 21–32)
COLOR UR: YELLOW
CORTIS SERPL-MCNC: 29.8 UG/DL
CREAT SERPL-MCNC: 3.06 MG/DL (ref 0.6–1.3)
EOSINOPHIL # BLD AUTO: 0 THOUSAND/ΜL (ref 0–0.61)
EOSINOPHIL NFR BLD AUTO: 0 % (ref 0–6)
ERYTHROCYTE [DISTWIDTH] IN BLOOD BY AUTOMATED COUNT: 14.3 % (ref 11.6–15.1)
FLUAV AG SPEC QL: NORMAL
FLUBV AG SPEC QL: NORMAL
GFR SERPL CREATININE-BSD FRML MDRD: 20 ML/MIN/1.73SQ M
GLUCOSE SERPL-MCNC: 101 MG/DL (ref 65–140)
GLUCOSE SERPL-MCNC: 105 MG/DL (ref 65–140)
GLUCOSE SERPL-MCNC: 113 MG/DL (ref 65–140)
GLUCOSE SERPL-MCNC: 159 MG/DL (ref 65–140)
GLUCOSE SERPL-MCNC: 208 MG/DL (ref 65–140)
GLUCOSE SERPL-MCNC: 211 MG/DL (ref 65–140)
GLUCOSE SERPL-MCNC: 231 MG/DL (ref 65–140)
GLUCOSE SERPL-MCNC: 253 MG/DL (ref 65–140)
GLUCOSE SERPL-MCNC: 70 MG/DL (ref 65–140)
GLUCOSE SERPL-MCNC: 72 MG/DL (ref 65–140)
GLUCOSE SERPL-MCNC: 77 MG/DL (ref 65–140)
GLUCOSE UR STRIP-MCNC: NEGATIVE MG/DL
HCO3 BLDA-SCNC: 20.5 MMOL/L (ref 22–28)
HCT VFR BLD AUTO: 26.9 % (ref 36.5–49.3)
HGB BLD-MCNC: 8.8 G/DL (ref 12–17)
HGB UR QL STRIP.AUTO: ABNORMAL
HOROWITZ INDEX BLDA+IHG-RTO: 90 MM[HG]
HYALINE CASTS #/AREA URNS LPF: ABNORMAL /LPF
IMM GRANULOCYTES # BLD AUTO: 0.14 THOUSAND/UL (ref 0–0.2)
IMM GRANULOCYTES NFR BLD AUTO: 1 % (ref 0–2)
KETONES UR STRIP-MCNC: ABNORMAL MG/DL
LEUKOCYTE ESTERASE UR QL STRIP: NEGATIVE
LYMPHOCYTES # BLD AUTO: 0.88 THOUSANDS/ΜL (ref 0.6–4.47)
LYMPHOCYTES NFR BLD AUTO: 4 % (ref 14–44)
MCH RBC QN AUTO: 30.2 PG (ref 26.8–34.3)
MCHC RBC AUTO-ENTMCNC: 32.7 G/DL (ref 31.4–37.4)
MCV RBC AUTO: 92 FL (ref 82–98)
MONOCYTES # BLD AUTO: 1.91 THOUSAND/ΜL (ref 0.17–1.22)
MONOCYTES NFR BLD AUTO: 9 % (ref 4–12)
NEUTROPHILS # BLD AUTO: 18.62 THOUSANDS/ΜL (ref 1.85–7.62)
NEUTS SEG NFR BLD AUTO: 86 % (ref 43–75)
NITRITE UR QL STRIP: NEGATIVE
NON-SQ EPI CELLS URNS QL MICRO: ABNORMAL /HPF
NRBC BLD AUTO-RTO: 0 /100 WBCS
O2 CT BLDA-SCNC: 13.5 ML/DL (ref 16–23)
OXYHGB MFR BLDA: 98.2 % (ref 94–97)
P AXIS: 41 DEGREES
PCO2 BLDA: 36.1 MM HG (ref 36–44)
PEEP RESPIRATORY: 10 CM[H2O]
PH BLDA: 7.37 [PH] (ref 7.35–7.45)
PH UR STRIP.AUTO: 5 [PH] (ref 4.5–8)
PLATELET # BLD AUTO: 303 THOUSANDS/UL (ref 149–390)
PMV BLD AUTO: 9.1 FL (ref 8.9–12.7)
PO2 BLDA: 165.9 MM HG (ref 75–129)
POTASSIUM SERPL-SCNC: 4.1 MMOL/L (ref 3.5–5.3)
PR INTERVAL: 258 MS
PROCALCITONIN SERPL-MCNC: 6.14 NG/ML
PROT UR STRIP-MCNC: >=300 MG/DL
QRS AXIS: 53 DEGREES
QRSD INTERVAL: 92 MS
QT INTERVAL: 383 MS
QTC INTERVAL: 466 MS
RBC # BLD AUTO: 2.91 MILLION/UL (ref 3.88–5.62)
RBC #/AREA URNS AUTO: ABNORMAL /HPF
RSV B RNA SPEC QL NAA+PROBE: NORMAL
SODIUM SERPL-SCNC: 143 MMOL/L (ref 136–145)
SP GR UR STRIP.AUTO: >=1.03 (ref 1–1.03)
SPECIMEN SOURCE: ABNORMAL
T WAVE AXIS: 27 DEGREES
UROBILINOGEN UR QL STRIP.AUTO: 0.2 E.U./DL
VENT AC: 22
VENT- AC: AC
VENTRICULAR RATE: 89 BPM
VT SETTING VENT: 500 ML
WBC # BLD AUTO: 21.58 THOUSAND/UL (ref 4.31–10.16)
WBC #/AREA URNS AUTO: ABNORMAL /HPF

## 2018-11-02 PROCEDURE — 99222 1ST HOSP IP/OBS MODERATE 55: CPT | Performed by: INTERNAL MEDICINE

## 2018-11-02 PROCEDURE — 82330 ASSAY OF CALCIUM: CPT | Performed by: NURSE PRACTITIONER

## 2018-11-02 PROCEDURE — 93010 ELECTROCARDIOGRAM REPORT: CPT | Performed by: INTERNAL MEDICINE

## 2018-11-02 PROCEDURE — C9113 INJ PANTOPRAZOLE SODIUM, VIA: HCPCS | Performed by: NURSE PRACTITIONER

## 2018-11-02 PROCEDURE — 82805 BLOOD GASES W/O2 SATURATION: CPT | Performed by: NURSE PRACTITIONER

## 2018-11-02 PROCEDURE — 99232 SBSQ HOSP IP/OBS MODERATE 35: CPT | Performed by: INTERNAL MEDICINE

## 2018-11-02 PROCEDURE — 85025 COMPLETE CBC W/AUTO DIFF WBC: CPT | Performed by: NURSE PRACTITIONER

## 2018-11-02 PROCEDURE — 81001 URINALYSIS AUTO W/SCOPE: CPT | Performed by: NURSE PRACTITIONER

## 2018-11-02 PROCEDURE — 94003 VENT MGMT INPAT SUBQ DAY: CPT

## 2018-11-02 PROCEDURE — 82533 TOTAL CORTISOL: CPT | Performed by: NURSE PRACTITIONER

## 2018-11-02 PROCEDURE — 87631 RESP VIRUS 3-5 TARGETS: CPT | Performed by: NURSE PRACTITIONER

## 2018-11-02 PROCEDURE — 99291 CRITICAL CARE FIRST HOUR: CPT | Performed by: INTERNAL MEDICINE

## 2018-11-02 PROCEDURE — 82948 REAGENT STRIP/BLOOD GLUCOSE: CPT

## 2018-11-02 PROCEDURE — 84145 PROCALCITONIN (PCT): CPT | Performed by: NURSE PRACTITIONER

## 2018-11-02 PROCEDURE — 80048 BASIC METABOLIC PNL TOTAL CA: CPT | Performed by: NURSE PRACTITIONER

## 2018-11-02 RX ORDER — DEXTROSE MONOHYDRATE 25 G/50ML
INJECTION, SOLUTION INTRAVENOUS
Status: COMPLETED
Start: 2018-11-02 | End: 2018-11-02

## 2018-11-02 RX ORDER — DEXTROSE MONOHYDRATE 25 G/50ML
INJECTION, SOLUTION INTRAVENOUS
Status: COMPLETED
Start: 2018-11-02 | End: 2018-11-03

## 2018-11-02 RX ORDER — ALBUMIN, HUMAN INJ 5% 5 %
25 SOLUTION INTRAVENOUS ONCE
Status: COMPLETED | OUTPATIENT
Start: 2018-11-02 | End: 2018-11-02

## 2018-11-02 RX ORDER — FENTANYL CITRATE/PF 50 MCG/ML
50 SYRINGE (ML) INJECTION EVERY 2 HOUR PRN
Status: DISCONTINUED | OUTPATIENT
Start: 2018-11-02 | End: 2018-11-03

## 2018-11-02 RX ORDER — CEFEPIME HYDROCHLORIDE 1 G/1
2000 INJECTION, POWDER, FOR SOLUTION INTRAMUSCULAR; INTRAVENOUS EVERY 12 HOURS
Status: DISCONTINUED | OUTPATIENT
Start: 2018-11-02 | End: 2018-11-02 | Stop reason: SDUPTHER

## 2018-11-02 RX ADMIN — HEPARIN SODIUM 5000 UNITS: 5000 INJECTION INTRAVENOUS; SUBCUTANEOUS at 14:20

## 2018-11-02 RX ADMIN — HEPARIN SODIUM 5000 UNITS: 5000 INJECTION INTRAVENOUS; SUBCUTANEOUS at 21:46

## 2018-11-02 RX ADMIN — SODIUM CHLORIDE 75 ML/HR: 0.9 INJECTION, SOLUTION INTRAVENOUS at 18:52

## 2018-11-02 RX ADMIN — EPINEPHRINE 15 MCG/MIN: 1 INJECTION, SOLUTION, CONCENTRATE INTRAVENOUS at 05:15

## 2018-11-02 RX ADMIN — FENTANYL CITRATE 50 MCG: 50 INJECTION INTRAMUSCULAR; INTRAVENOUS at 08:44

## 2018-11-02 RX ADMIN — Medication 1 G: at 05:32

## 2018-11-02 RX ADMIN — SODIUM CHLORIDE 125 ML/HR: 0.9 INJECTION, SOLUTION INTRAVENOUS at 07:14

## 2018-11-02 RX ADMIN — PANTOPRAZOLE SODIUM 40 MG: 40 INJECTION, POWDER, FOR SOLUTION INTRAVENOUS at 08:44

## 2018-11-02 RX ADMIN — VASOPRESSIN 0.03 UNITS/MIN: 20 INJECTION INTRAVENOUS at 03:50

## 2018-11-02 RX ADMIN — CEFEPIME HYDROCHLORIDE 2000 MG: 2 INJECTION, POWDER, FOR SOLUTION INTRAVENOUS at 10:01

## 2018-11-02 RX ADMIN — EPINEPHRINE 12 MCG/MIN: 1 INJECTION, SOLUTION, CONCENTRATE INTRAVENOUS at 09:03

## 2018-11-02 RX ADMIN — FENTANYL CITRATE 50 MCG: 50 INJECTION INTRAMUSCULAR; INTRAVENOUS at 15:08

## 2018-11-02 RX ADMIN — ALBUMIN HUMAN 25 G: 0.05 INJECTION, SOLUTION INTRAVENOUS at 01:38

## 2018-11-02 RX ADMIN — HEPARIN SODIUM 5000 UNITS: 5000 INJECTION INTRAVENOUS; SUBCUTANEOUS at 05:14

## 2018-11-02 RX ADMIN — CEFEPIME HYDROCHLORIDE 2000 MG: 2 INJECTION, POWDER, FOR SOLUTION INTRAVENOUS at 20:41

## 2018-11-02 RX ADMIN — SODIUM CHLORIDE 125 ML/HR: 0.9 INJECTION, SOLUTION INTRAVENOUS at 00:13

## 2018-11-02 RX ADMIN — ACETAMINOPHEN 650 MG: 650 SUPPOSITORY RECTAL at 05:07

## 2018-11-02 RX ADMIN — SODIUM CHLORIDE 12 UNITS/HR: 9 INJECTION, SOLUTION INTRAVENOUS at 01:39

## 2018-11-02 RX ADMIN — SODIUM CHLORIDE 75 ML/HR: 0.9 INJECTION, SOLUTION INTRAVENOUS at 18:50

## 2018-11-02 RX ADMIN — FENTANYL CITRATE 50 MCG: 50 INJECTION INTRAMUSCULAR; INTRAVENOUS at 11:07

## 2018-11-02 RX ADMIN — EPINEPHRINE 20 MCG/MIN: 1 INJECTION, SOLUTION, CONCENTRATE INTRAVENOUS at 02:36

## 2018-11-02 RX ADMIN — Medication 150 ML/HR: at 00:10

## 2018-11-02 RX ADMIN — FENTANYL CITRATE 50 MCG: 50 INJECTION INTRAMUSCULAR; INTRAVENOUS at 20:39

## 2018-11-02 RX ADMIN — CALCIUM GLUCONATE 1 G: 98 INJECTION, SOLUTION INTRAVENOUS at 00:15

## 2018-11-02 RX ADMIN — LORAZEPAM 1 MG: 2 INJECTION INTRAMUSCULAR; INTRAVENOUS at 05:13

## 2018-11-02 RX ADMIN — EPINEPHRINE 20 MCG/MIN: 1 INJECTION, SOLUTION, CONCENTRATE INTRAVENOUS at 00:14

## 2018-11-02 RX ADMIN — DEXTROSE MONOHYDRATE 25 ML: 25 INJECTION, SOLUTION INTRAVENOUS at 12:16

## 2018-11-02 RX ADMIN — CHLORHEXIDINE GLUCONATE 0.12% ORAL RINSE 15 ML: 1.2 LIQUID ORAL at 20:41

## 2018-11-02 RX ADMIN — CHLORHEXIDINE GLUCONATE 0.12% ORAL RINSE 15 ML: 1.2 LIQUID ORAL at 08:44

## 2018-11-02 NOTE — CONSULTS
2           Consultation - Nephrology   Amelia Perez 70 y o  male MRN: [id number]  Unit/Bed#: ICU 03 Encounter: [id number]      Assessment/Plan     Assessment / Plan:  1  Renal    The patient presents to the hospital in severe bradycardia with complete heart block and oliguric acute renal failure     This will affect cardiac output and as a result can lead to renal failure  It is also possible that this can eventually progressed to ATN depending on lb duration and extent of injury from the low cardiac output state  He was treated overnight and now has temporary pacemaker with good heart rate restored  Urine output is starting to improve at 60 cc/hour  At this point we will continue to observe with restoration of cardiac output no need for renal placement therapy presently  The other possibilities he could have element of ATN related to his cardiac arrest that required short bouts of resuscitation in the emergency room  Metabolic acidosis has improved  Urinalysis was very abnormal but would recheck as it appeared to be some contaminant issues  Cardiology to manage Dora Roc dysrhythmia  Maintain cardiac output  Monitor renal function electrolytes    2  Bradycardia    Unclear etiology patient has temporary pacemaker  Being investigated for potential relationship to Lyme disease  Cardiology monitoring and managing  3  Fever    The patient developed fever in the hospital is presently intubated  Started on empiric antibiotics  History of Present Illness   Physician Requesting Consult: Josh Lantigua MD  Reason for Consult / Principal Problem:  Acute renal failure  Hx and PE limited by:   HPI: Amelia Perez is a 70y o  year old male who presented to the emergency room complaining of lightheadedness and dizziness  According to the chart there was also increasing fatigue and he had been feeling well prior to that according to his wife  The patient currently is intubated so cannot give history    He was found to have profound bradycardia with complete heart block  He was noted to have a creatinine around 2 that is increased with no significant urine output until this morning were asked to see him regarding this  History obtained from chart review and patient was questioned although he was only able to nod some answers due to intubated state  Inpatient consult to Nephrology  Consult performed by: Aldo Goncalves ordered by: Inderjit Norton          Review of Systems  Patient denied pain unable to give complete review of systems due to his current clinical state  On the ventilator no distress  Historical Information   Patient Active Problem List   Diagnosis    Complete heart block (HCC)    Metabolic acidosis    [facility] [address] [facility]    Idiopathic hypotension    Other hyperlipidemia     Past Medical History:   Diagnosis Date    Diabetes mellitus ([facility] [address] [facility]     Diabetic foot ulcer ([facility] [address] [facility]     Eczema     Erectile dysfunction     Gout     Hyperlipidemia     Hypertension     Murmur     Nephropathy     Osteoarthritis     PAD (peripheral artery disease) (HCC)     Vertigo     No history of renal disease    Past Surgical History:   Procedure Laterality Date    CARPAL TUNNEL RELEASE      KNEE SURGERY      SHOULDER SURGERY       Social History   History   Alcohol Use No     History   Drug Use No     History   Smoking Status    Former Smoker    Packs/day: 1 00    Years: 30 00   Smokeless Tobacco    Not on file     Comment: quit 10 years ago     Family History   Problem Relation Age of Onset    Heart disease Father        Meds/Allergies   current meds:   Current Facility-Administered Medications   Medication Dose Route Frequency    acetaminophen (TYLENOL) rectal suppository 650 mg  650 mg Rectal Q4H PRN    cefepime (MAXIPIME) 2,000 mg in dextrose 5 % 50 mL IVPB  2,000 mg Intravenous Q12H    chlorhexidine (PERIDEX) 0 12 % oral rinse 15 mL  15 mL Swish & Spit Q12H [facility]    EPINEPHrine 3000 mcg (STANDARD CONCENTRATION) IV in sodium chloride 0 9% 250 mL  1-20 mcg/min Intravenous Titrated    fentaNYL (SUBLIMAZE) injection 50 mcg  50 mcg Intravenous Q2H PRN    heparin (porcine) subcutaneous injection 5,000 Units  5,000 Units Subcutaneous Q8H [address]    insulin regular (HumuLIN R,NovoLIN R) 1 Units/mL in sodium chloride 0 9 % 100 mL infusion  0 3-21 Units/hr Intravenous Titrated    LORazepam (ATIVAN) 2 mg/mL injection 1 mg  1 mg Intravenous Q4H PRN    pantoprazole (PROTONIX) injection 40 mg  40 mg Intravenous Q24H REBA    sodium chloride 0 9 % infusion  125 mL/hr Intravenous Continuous    vasopressin (PITRESSIN) 20 Units in sodium chloride 0 9 % 100 mL infusion  0 03 Units/min Intravenous Continuous     Allergies   Allergen Reactions    Invokana [Canagliflozin]     Lamisil [Terbinafine] Blisters     Wife states " His skin peeled from head to toe"    Latex     Other Rash     palmegranate       Objective     Intake/Output Summary (Last 24 hours) at 11/02/18 0934  Last data filed at 11/02/18 0800   Gross per 24 hour   Intake          7214 51 ml   Output              235 ml   Net          6979 51 ml     Body mass index is 36 57 kg/m²  Invasive Devices:   Urethral Catheter Non-latex 16 Fr  (Active)   Amt returned on insertion(mL) 45 mL 11/1/2018  5:07 PM   Site Assessment Clean;Skin intact 11/2/2018 12:00 AM   Collection Container Standard drainage bag 11/2/2018 12:00 AM   Securement Method Securing device (Describe) 11/2/2018 12:00 AM   Output (mL) 60 mL 11/2/2018  8:00 AM       PHYSICAL EXAM:  /53   Pulse 88   Temp 100 4 °F (38 °C)   Resp 21   Ht 5' 10" (1 778 m)   Wt 116 kg (254 lb 13 6 oz)   SpO2 99%   BMI 36 57 kg/m²     Physical Exam   Constitutional:   Ventilator  HENT:   Intubated  Neck: No JVD present  Cardiovascular: Regular rhythm  Exam reveals no friction rub  No significant edema  Pulmonary/Chest: No respiratory distress  He has no rales  Abdominal: Soft   He exhibits no distension  There is no tenderness  Current Weight: Weight - Scale: 116 kg (254 lb 13 6 oz)  First Weight: Weight - Scale: 100 kg (220 lb 7 4 oz)    Lab Results:      Results from last 7 days  Lab Units 11/02/18  0415   WBC Thousand/uL 21 58*   HEMOGLOBIN g/dL 8 8*   HEMATOCRIT % 26 9*   PLATELETS Thousands/uL 303       Results from last 7 days  Lab Units 11/02/18  0415  11/01/18  1630   POTASSIUM mmol/L 4 1  < >  --    CHLORIDE mmol/L 107  < >  --    CO2 mmol/L 23  < >  --    CO2, I-STAT mmol/L  --   --  7*   BUN mg/dL 43*  < >  --    CREATININE mg/dL 3 06*  < >  --    GLUCOSE, ISTAT mg/dl  --   --  142*   CALCIUM mg/dL 7 7*  < >  --    < > = values in this interval not displayed  Results from last 7 days  Lab Units 11/02/18  0415  11/01/18  1630 11/01/18  1614   POTASSIUM mmol/L 4 1  < >  --  4 9   CHLORIDE mmol/L 107  < >  --  107   CO2 mmol/L 23  < >  --  17*   CO2, I-STAT mmol/L  --   --  7*  --    BUN mg/dL 43*  < >  --  39*   CREATININE mg/dL 3 06*  < >  --  2 36*   CALCIUM mg/dL 7 7*  < >  --  8 0*   ALK PHOS U/L  --   --   --  80   ALT U/L  --   --   --  61   AST U/L  --   --   --  65*   GLUCOSE, ISTAT mg/dl  --   --  142*  --    < > = values in this interval not displayed

## 2018-11-02 NOTE — PROGRESS NOTES
If plans to initate TF rec Jevity 1 5 @20ml/hr advance as tolerated to goal of 55ml/hr, H20 flushes 100ml q 4hrs provides total of 1980cal, 84g pro, 1603ml

## 2018-11-02 NOTE — UTILIZATION REVIEW
Initial Clinical Review    Admission: Date/Time/Statement: 11/1/18 @ 1440     Orders Placed This Encounter   Procedures    Inpatient Admission (expected length of stay for this patient is greater than two midnights)     Standing Status:   Standing     Number of Occurrences:   1     Order Specific Question:   Admitting Physician     Answer:   Adan Mosley [[id number]]     Order Specific Question:   Level of Care     Answer:   Critical Care [15]     Order Specific Question:   Estimated length of stay     Answer:   More than 2 Midnights     Order Specific Question:   Certification     Answer:   I certify that inpatient services are medically necessary for this patient for a duration of greater than two midnights  See H&P and MD Progress Notes for additional information about the patient's course of treatment  ED: Date/Time/Mode of Arrival:   ED Arrival Information     Expected Arrival Acuity Means of Arrival Escorted By Service Admission Type    - 11/1/2018 12:10 Immediate Ambulance [facility] EMS General Medicine Emergency    Arrival Complaint    stroke alert          Chief Complaint:   Chief Complaint   Patient presents with    Heart Problem     feeling weak for past 2 weeks  Pt comes in with EMS in third degree heart block       History of Illness: Ron Barton is a 70 y o   male with a past medical history significant for hypertension, hyperlipidemia, peripheral arterial disease, gout, diabetes mellitus II, who presented to [facility] ER today via EMS for chief complaint of worsening dizziness and fatigue, generally feeling unwell, and unable to tolerate any activity  Wife provides history  When EMS arrived he was found to have profound bradycardia with heart rate in 20s, altered mental status, complete heart block and a LBBB      Upon arrival to the ER, attempts to place a transvenous pacemaker at the bedside were unsuccessful in achieving capture   He had 2 cardiac arrests that were approximately 30 seconds with ROSC  The patient was intubated and taken to the cath lab for an emergent transvenous pacemaker placement  Arrived to the intensive care unit hypotensive on Epinephrine and Dobutamine      Of note, the patient was recently seen by his primary care provider on 10/29/18 for intermittent dizziness and vertigo  He was started on meclizine for dizziness and amoxicillin for sinus infection  ED Vital Signs:   ED Triage Vitals   Temperature Pulse Respirations Blood Pressure SpO2   11/01/18 1528 11/01/18 1215 11/01/18 1215 11/01/18 1215 11/01/18 1215   (!) 96 9 °F (36 1 °C) (!) 30 (!) 31 129/71 94 %      Temp Source Heart Rate Source Patient Position - Orthostatic VS BP Location FiO2 (%)   11/01/18 1528 11/01/18 1233 11/01/18 1236 11/01/18 1236 --   Temporal Monitor Lying Right arm       Pain Score       11/01/18 1549       No Pain        Wt Readings from Last 1 Encounters:   11/02/18 116 kg (254 lb 13 6 oz)       Vital Signs (abnormal):    above    Abnormal Labs/Diagnostic Test Results:   BUN/Creat   37/1 99  CO2    18  Albumin  2 6  BNP  949  WBC  20 67  H/H   11 4/35 6  Platelets    677  RBC   3 74  CXR:  Mild cardiomegaly with Pulmonary edema/bilateral infiltrates      ED Treatment:   Medication Administration from 11/01/2018 1210 to 11/01/2018 1526       Date/Time Order Dose Route Action Action by Comments     11/01/2018 1218 midazolam (VERSED) 5 mg/5 mL injection **ADS Override Pull** 1 mg  Given Lamont Christianson RN by Radha Lee RN     11/01/2018 1455  EMS REPLENISHMENT MED 0  Does not apply Given to EMS Lamont Christianson RN      11/01/2018 1350 DOBUTamine in dextrose 5% (DOBUTREX) 500 mg in 250 mL infusion (premix) **ADS Override Pull**    Rate/Dose Change Lamont Christianson RN      11/01/2018 1329 DOBUTamine in dextrose 5% (DOBUTREX) 500 mg in 250 mL infusion (premix) **ADS Override Pull**    Rate/Dose Change Lamont Christianson RN      11/01/2018 1358 EPINEPHrine 3000 mcg (STANDARD CONCENTRATION) IV in sodium chloride 0 9% 250 mL 10 mcg/min Intravenous [facility] Severiano Sharma RN      11/01/2018 1425 lidocaine (XYLOCAINE) 1 % injection 10 mL Infiltration Given Isabella Retana MD right groin     11/01/2018 1326 DOBUTamine (DOBUTREX) 500 mg in 250 mL infusion (premix)   Intravenous [facility] Severiano Sharma RN      11/01/2018 1241 lidocaine (PF) (XYLOCAINE-MPF) 1 % injection 5 mL 5 mL Infiltration Given Larissa Berg RN given to provider for administration     11/01/2018 1300 ondansetron (ZOFRAN) injection 4 mg 4 mg Intravenous Given LarissaMARGAUX Rhoades RN  L AC     11/01/2018 1315 midazolam (VERSED) injection 1 mg 1 mg Intravenous Given Larissa Berg RN      11/01/2018 1307 midazolam (VERSED) injection 1 mg 1 mg Intravenous Given Larissa Berg RN      11/01/2018 1321 etomidate (AMIDATE) 2 mg/mL injection 20 mg 20 mg Intravenous Given Larissa Berg RN Through RSI box     11/01/2018 1322 vecuronium (NORCURON) injection 10 mg 10 mg Intravenous Given Larissatalia Berg RN through RSI box          Past Medical/Surgical History: Active Ambulatory Problems     Diagnosis Date Noted    No Active Ambulatory Problems     Resolved Ambulatory Problems     Diagnosis Date Noted    No Resolved Ambulatory Problems     Past Medical History:   Diagnosis Date    Diabetes mellitus [facility] [address] [facility]     Diabetic foot ulcer ([facility] [address] )     Eczema     Erectile dysfunction     Gout     Hyperlipidemia     Hypertension     Murmur     Nephropathy     Osteoarthritis     PAD (peripheral artery disease) (HCC)     Vertigo        Admitting Diagnosis: Cardiac arrest ([address] ) [I46 9]  Respiratory arrest ([address] ) [R09 2]  Third degree heart block ([address] ) [I44 2]  Weakness [R53 1]  Hypotension [I95 9]    Age/Sex: 70 y o  male    1   Assessment/Plan: Complete Heart Block with LBBB and profound bradycardia  · Will admit to critical care service for closer monitoring, will require great than 2 midnight hospitalization stay  · Transvenous pacemaker placed in cath lab  · Plan for PPM placement once stabilized   · Stat echocardiogram due to hypotension in the unit- EF appears to be normal  · Troponin 0 02 upon admission  · Cardiology consulted   2  Acute Hypoxic Respiratory Failure  ? Secondary to complete heart block and profound bradycardia and cardiac arrest for airway protection  ? Continue mechanical ventilation and wean as tolerated  3  Acute Metabolic Acidosis  ? Ph 6 9, no signs of cardiogenic shock, EF normal on echocardiogram, CVP 15  ? Hypotensive despite epinephrine infusion 20 mcg/min  ? Dobutamine on hold  ? Give two amps of bicarb now followed by bicarb infusion  ? Repeat ABG   ? Keep map >65  4  Acute Kidney Injury  ? Baseline creatinine 1 1-1 2  ? Does follow with nephrology as an outpatient  ? Likely secondary to hypoperfusion  ? Fluid resuscitation  ? trend BMPS  ? Strict I/O, daily weights  If urine output does not improve may need CVVH  Diabetes Mellitus II  ? A1C 6 7 10/12  ? Start insulin gtt now, goal -180  7  Hypertension  ?  Currently hypotensive on vasopressors    Admission Orders:    IP   11/1  @  1440  Scheduled Meds:   Current Facility-Administered Medications:  acetaminophen 650 mg Rectal Q4H PRN June Cordon Spatzer, CRNP    cefepime 2,000 mg Intravenous Q12H PAT Crena    chlorhexidine 15 mL Swish & Spit Q12H [facility] Sumanth Given PAT Ordonez    epinephrine 1-20 mcg/min Intravenous Titrated Severo Paul MD Last Rate: 12 mcg/min (11/02/18 0903)   fentaNYL 50 mcg Intravenous Q2H PRN PAT Cerna    heparin (porcine) 5,000 Units Subcutaneous Q8H [facility] PAT Olsen    insulin regular (HumuLIN R,NovoLIN R) infusion 0 3-21 Units/hr Intravenous Titrated PAT Mccarthy Last Rate: 4 Units/hr (11/02/18 0835)   LORazepam 1 mg Intravenous Q4H PRN June Cordon Spatzer, CRNP    pantoprazole 40 mg Intravenous Q24H [facility] PAT Olsen    sodium chloride 125 mL/hr Intravenous Continuous PAT Trent Last Rate: 125 mL/hr (11/02/18 0714)   vasopressin (PITRESSIN) in 0 9 % sodium chloride 100 mL 0 03 Units/min Intravenous Continuous PAT Clark Last Rate: 0 03 Units/min (11/02/18 0350)     Continuous Infusions:   epinephrine 1-20 mcg/min Last Rate: 12 mcg/min (11/02/18 0903)   insulin regular (HumuLIN R,NovoLIN R) infusion 0 3-21 Units/hr Last Rate: 4 Units/hr (11/02/18 0835)   sodium chloride 125 mL/hr Last Rate: 125 mL/hr (11/02/18 0714)   vasopressin (PITRESSIN) in 0 9 % sodium chloride 100 mL 0 03 Units/min Last Rate: 0 03 Units/min (11/02/18 0350)     PRN Meds:   acetaminophen    fentaNYL    LORazepam     Intubated  2 DE  Cons cardiology  Fall precautions  Dysphagia  eval  Neuro  Checks  Q 4 hrs  Serial troponin  [facility]  Cons nephrology  Droplet precautions    Per  Cardiology  Consult:   Complete heart block  Patient quite symptomatic  Attempts at placing transvenous pacer at bedside were unsuccessful to achieve capture  Cath lab called in urgently  Will rule out provoking causes of heart block including acute MI  Echocardiogram as been ordered  2  Acute renal failure  Likely due to hypoperfusion  3  Hypertension  Currently hypotensive and hopefully will correct with correction of heart rate  4  Diabetes mellitus  5  Hyperlipidemia-on statin therapy  6   Lower extremity edema-well controlled with furosemide         Will follow with you and advise further  Hold metoprolol  Unless acute cause for this scenario plan for permanent pacemaker tomorrow

## 2018-11-02 NOTE — PROGRESS NOTES
Progress Note - Cardiology   Harley Yanes 70 y o  male MRN: [id number]  Unit/Bed#: ICU 03 Encounter: [id number]      Assessment:     1  Fever/sepsis  2  Complete heart block status post temporary pacemaker-not pacing currently  3  Acute renal failure  4  Mild aortic stenosis, some degree of mitral stenosis  5  Diabetes   6  Hypertension  7  Dyslipidemia    Discussion/Recommendations:    Currently will hold off on permanent pacemaker placed given probable infection  Antibiotics per primary team-blood cultures pending  Procalcitonin elevated      Would avoid significant positive fluid balance of possible    Subjective:  Unable to provide complaints-intubated      Physical Exam:  GEN:  Intubated sedated  HEENT:  MMM, NCAT, pink conjunctiva, EOMI, nonicteric sclera  CV:  NO JVD/HJR, RR, 2/6 systolic ejection murmur, +S1/S2, NO PARASTERNAL HEAVE/THRILL, 1+ LE EDEMA, NO HEPATIC SYSTOLIC PULSATION, WARM EXTREMITIES  RESP:  CTAB/L  ABD:  SOFT, NT, NO GROSS ORGANOMEGALY        Vitals:   /53   Pulse 88   Temp 100 4 °F (38 °C)   Resp 21   Ht 5' 10" (1 778 m)   Wt 116 kg (254 lb 13 6 oz)   SpO2 99%   BMI 36 57 kg/m²   Vitals:    11/01/18 1528 11/02/18 0543   Weight: 109 kg (240 lb 4 8 oz) 116 kg (254 lb 13 6 oz)       Intake/Output Summary (Last 24 hours) at 11/02/18 0814  Last data filed at 11/02/18 0800   Gross per 24 hour   Intake          7214 51 ml   Output              175 ml   Net          7039 51 ml         TELEMETRY:  No events  Lab Results:    Results from last 7 days  Lab Units 11/02/18  0415   WBC Thousand/uL 21 58*   HEMOGLOBIN g/dL 8 8*   HEMATOCRIT % 26 9*   PLATELETS Thousands/uL 303       Results from last 7 days  Lab Units 11/02/18  0415  11/01/18  1630 11/01/18  1614   POTASSIUM mmol/L 4 1  < >  --  4 9   CHLORIDE mmol/L 107  < >  --  107   CO2 mmol/L 23  < >  --  17*   CO2, I-STAT mmol/L  --   --  7*  --    BUN mg/dL 43*  < >  --  39*   CREATININE mg/dL 3 06*  < >  --  2 36*   CALCIUM mg/dL 7 7*  < >  --  8 0*   ALK PHOS U/L  --   --   --  80   ALT U/L  --   --   --  61   AST U/L  --   --   --  65*   GLUCOSE, ISTAT mg/dl  --   --  142*  --    < > = values in this interval not displayed  Results from last 7 days  Lab Units 11/02/18  0415  11/01/18  1630   POTASSIUM mmol/L 4 1  < >  --    CHLORIDE mmol/L 107  < >  --    CO2 mmol/L 23  < >  --    CO2, I-STAT mmol/L  --   --  7*   BUN mg/dL 43*  < >  --    CREATININE mg/dL 3 06*  < >  --    GLUCOSE, ISTAT mg/dl  --   --  142*   CALCIUM mg/dL 7 7*  < >  --    < > = values in this interval not displayed            Medications:    Current Facility-Administered Medications:     acetaminophen (TYLENOL) rectal suppository 650 mg, 650 mg, Rectal, Q4H PRN, Lior Captain Spatzer, CRNP, 650 mg at 11/02/18 0507    chlorhexidine (PERIDEX) 0 12 % oral rinse 15 mL, 15 mL, Swish & Spit, Q12H [address], Kaushik Person PAT Ordonez, 15 mL at 11/01/18 2016    EPINEPHrine 3000 mcg (STANDARD CONCENTRATION) IV in sodium chloride 0 9% 250 mL, 1-20 mcg/min, Intravenous, Titrated, Tomi Frankel, MD, Last Rate: 70 mL/hr at 11/02/18 0543, 14 mcg/min at 11/02/18 0543    heparin (porcine) subcutaneous injection 5,000 Units, 5,000 Units, Subcutaneous, Q8H [address], 5,000 Units at 11/02/18 0514 **AND** Platelet count, , , Once, PAT Olsen    insulin regular (HumuLIN R,NovoLIN R) 1 Units/mL in sodium chloride 0 9 % 100 mL infusion, 0 3-21 Units/hr, Intravenous, Titrated, PAT Olsen, Last Rate: 8 mL/hr at 11/02/18 0603, 8 Units/hr at 11/02/18 0603    LORazepam (ATIVAN) 2 mg/mL injection 1 mg, 1 mg, Intravenous, Q4H PRN, Lior Captain Spatzer, CRNP, 1 mg at 11/02/18 0513    pantoprazole (PROTONIX) injection 40 mg, 40 mg, Intravenous, Q24H [address], Genesis K PAT Ordonez, 40 mg at 11/01/18 1725    sodium chloride 0 9 % infusion, 125 mL/hr, Intravenous, Continuous, Kaushik Person PAT Ordonez, Last Rate: 125 mL/hr at 11/02/18 0714, 125 mL/hr at 11/02/18 0714    vasopressin (PITRESSIN) 20 Units in sodium chloride 0 9 % 100 mL infusion, 0 03 Units/min, Intravenous, Continuous, Kaushik Person PAT Ordonez, Last Rate: 9 mL/hr at 11/02/18 0350, 0 03 Units/min at 11/02/18 0350    Portions of the record may have been created with voice recognition software  Occasional wrong word or "sound a like" substitutions may have occurred due to the inherent limitations of voice recognition software  Read the chart carefully and recognize, using context, where substitutions have occurred

## 2018-11-02 NOTE — PROGRESS NOTES
Progress Note - Critical Care   Buster Both 70 y o  male MRN: [id number]  Unit/Bed#: ICU 03 Encounter: [id number]    Assessment/Plan:  1  Complete heart block status post temporary transvenous pacer placement in cath lab  · Cardiology following, continue with recommendations  · Patient's wife states patient has had general nonspecific symptoms for over a month, Lyme disease titer pending due to possible correlation with heart block  · Patient 100% V paced, plan for permanent pacemaker placement possibly today if hemodynamically stable  · Echocardiogram shows EF of 65%  · Continue close hemodynamic monitoring    2  Fevers of unknown etiology  · Rectal Tylenol administered, T-max 101 8°, slowly trending down  · White blood cell count was 20 67 upon admission, absolute neutrophils 16 34, will obtain CBC with morning labs  · Blood cultures and procalcitonin pending, urinalysis was obtained is positive for bacteria but no nitrates or white blood cells present  · Will hold off on antibiotics at this time until pro calcitonin is resulted     3  Acute hypoxic respiratory failure related to complete heart block and pulmonary edema  · Continue mechanical ventilation, wean as able  · Chest x-ray 11/1/2018- Persistent pulmonary edema with bibasilar pleural effusions and volume loss versus infiltrates  · Maintain head of bed greater than 30°, suction as needed, mouth care q4 hours    4  Acute kidney injury likely secondary to hypotension  · Creatinine 0 8-1 2 at baseline, patient sees Nephrology as an outpatient, creatinine elevated this admission at 2 85, will trend BMP's  · Urine output decreased overnight, will continue strict I/O's with daily weights, continue IV fluids and avoid nephrotoxic drugs  · May require CVVH if kidney function does not improve, Nephrology was consulted    5   Acute metabolic acidosis   · pH was 7 1, bicarb 13, was given 2 amps of bicarb and started on bicarb drip repeat pH was 7 3, bicarb 17, lactic acid was trending down  · Repeat ABG with morning labs, continue bicarb drip at this time  · Patient is still on epinephrine infusion at 20 mcg/min, and vasopressin at 0 03 Units/min  · MAP trending down 55-60 overnight, albumin was given with vast improvement, maintain MAP >65     6   Diabetes Mellitus type II  · Continue insulin drip at this time, Q2 hour Accu-Cheks, maintain glucose less than 200     _____________________________________________________________________    HPI/24hr events:   Febrile overnight, T-max 101 8°    Medications:    Current Facility-Administered Medications:  acetaminophen 650 mg Rectal Q4H PRN Tristan Splinter Spatzer, CRNP    chlorhexidine 15 mL Swish & Spit Q12H [facility] PAT Saldana    epinephrine 1-20 mcg/min Intravenous Titrated Nikhil Vergara MD Last Rate: 18 mcg/min (11/02/18 0351)   heparin (porcine) 5,000 Units Subcutaneous Q8H [facility] PAT Olsen    insulin regular (HumuLIN R,NovoLIN R) infusion 0 3-21 Units/hr Intravenous Titrated PAT Metzger Last Rate: 14 Units/hr (11/02/18 0156)   LORazepam 1 mg Intravenous Q4H PRN Dwight Splinter Spatzer, CRNP    pantoprazole 40 mg Intravenous Q24H [facility] PAT Olsen    sodium bicarbonate infusion 150 mL/hr Intravenous Continuous Sayda Panda, CRNP Last Rate: 150 mL/hr (11/02/18 0010)   sodium chloride 125 mL/hr Intravenous Continuous PAT Saldana Last Rate: 125 mL/hr (11/02/18 0013)   vasopressin (PITRESSIN) in 0 9 % sodium chloride 100 mL 0 03 Units/min Intravenous Continuous PAT Saldana Last Rate: 0 03 Units/min (11/02/18 0350)         epinephrine 1-20 mcg/min Last Rate: 18 mcg/min (11/02/18 0351)   insulin regular (HumuLIN R,NovoLIN R) infusion 0 3-21 Units/hr Last Rate: 14 Units/hr (11/02/18 0156)   sodium bicarbonate infusion 150 mL/hr Last Rate: 150 mL/hr (11/02/18 0010)   sodium chloride 125 mL/hr Last Rate: 125 mL/hr (11/02/18 0013)   vasopressin (PITRESSIN) in 0 9 % sodium chloride 100 mL 0 03 Units/min Last Rate: 0 03 Units/min (11/02/18 0350)         Physical exam:  Vitals: Body mass index is 34 48 kg/m²  Blood pressure 119/53, pulse 98, temperature (!) 101 5 °F (38 6 °C), resp  rate (!) 23, height 5' 10" (1 778 m), weight 109 kg (240 lb 4 8 oz), SpO2 100 %  ,  Temp  Min: 94 3 °F (34 6 °C)  Max: 101 8 °F (38 8 °C)  IBW: 73 kg    SpO2: 100 %  SpO2 Activity: At Rest  O2 Device:  (intubation)      Intake/Output Summary (Last 24 hours) at 11/02/18 0357  Last data filed at 11/02/18 0300   Gross per 24 hour   Intake          5726 55 ml   Output              115 ml   Net          5611 55 ml       Invasive/non-invasive ventilation settings:   Respiratory    Lab Data (Last 4 hours)    None         O2/Vent Data (Last 4 hours)    None              Invasive Devices     Central Venous Catheter Line            CVC Central Lines 11/01/18 Right Internal jugular less than 1 day          Peripheral Intravenous Line            Peripheral IV Right Antecubital -- days    Peripheral IV 11/01/18 Left Antecubital 1 day          Line            Arterial Sheath Other (Comment) Right Femoral less than 1 day    Pacer Wires less than 1 day    Venous Sheath 6 Fr  Right Femoral less than 1 day          Drain            NG/OG/Enteral Tube Orogastric 18 Fr Center mouth less than 1 day    Urethral Catheter Non-latex 16 Fr  less than 1 day          Airway            ETT  7 5 mm less than 1 day    ETT  Cuffed 7 5 mm less than 1 day                  Physical Exam:  Gen:  Intubated at this time, appears ill  HEENT:  Normocephalic  PERRLA  EOMs intact  Mucous membranes pink and moist   Positive gag and cough  Neck:  Full range of motion  No adenopathy or JVD noted  Chest:  Symmetrical   Mild coarseness bilaterally  Scant secretions  Cor:  S1-S2 heart sounds, positive murmur, 100% v- paced  Abd:  Soft  Round  Non-tender  Positive bowel sounds  Ext:  No clubbing or cyanosis noted   +1 pitting edema in lower extremities  Neuro: Intubated  Opens eyes to voice  Follows commands  Skin:  Warm  Dry  Intact      Diagnostic Data:  Lab: I have personally reviewed pertinent lab results  CBC:     Results from last 7 days  Lab Units 11/01/18  1630 11/01/18  1614 11/01/18  1215   WBC Thousand/uL  --   --  20 67*   HEMOGLOBIN g/dL  --   --  11 4*   HEMATOCRIT % <15*  --  35 6*   PLATELETS Thousands/uL  --  346 397*       CMP:     Results from last 7 days  Lab Units 11/01/18  2302 11/01/18  1630 11/01/18  1614 11/01/18  1215   POTASSIUM mmol/L 4 0  --  4 9 5 2   CHLORIDE mmol/L 107  --  107 106   CO2 mmol/L 20*  --  17* 18*   CO2, I-STAT mmol/L  --  7*  --   --    BUN mg/dL 40*  --  39* 37*   CREATININE mg/dL 2 85*  --  2 36* 1 99*   CALCIUM mg/dL 7 6*  --  8 0* 8 8   ALK PHOS U/L  --   --  80 85   ALT U/L  --   --  61 33   AST U/L  --   --  65* 28   GLUCOSE, ISTAT mg/dl  --  142*  --   --      PT/INR:   Lab Results   Component Value Date    INR 1 08 11/01/2018   ,   Magnesium:     Results from last 7 days  Lab Units 11/01/18  1614 11/01/18  1215   MAGNESIUM mg/dL 2 4 2 3     Phosphorous:     Results from last 7 days  Lab Units 11/01/18  1614   PHOSPHORUS mg/dL 8 1*       Microbiology:      Pending Bcx     Imaging:  Cxr - Persistent pulmonary edema with bibasilar pleural effusions and volume loss versus infiltrates  Cardiac lab/EKG/telemetry/ECHO:   100 V Paced, EF 65% - 11/01/2018     VTE Prophylaxis:  On SQ heparin and SCDs    Code Status: Level 1 - Full Code    PAT Piña    Portions of the record may have been created with voice recognition software  Occasional wrong word or "sound a like" substitutions may have occurred due to the inherent limitations of voice recognition software  Read the chart carefully and recognize, using context, where substitutions have occurred

## 2018-11-03 PROBLEM — A41.9 SEPSIS (HCC): Status: ACTIVE | Noted: 2018-11-03

## 2018-11-03 LAB
ANION GAP SERPL CALCULATED.3IONS-SCNC: 8 MMOL/L (ref 4–13)
BUN SERPL-MCNC: 40 MG/DL (ref 5–25)
CALCIUM SERPL-MCNC: 7.9 MG/DL (ref 8.3–10.1)
CHLORIDE SERPL-SCNC: 111 MMOL/L (ref 100–108)
CO2 SERPL-SCNC: 24 MMOL/L (ref 21–32)
CREAT SERPL-MCNC: 1.73 MG/DL (ref 0.6–1.3)
ERYTHROCYTE [DISTWIDTH] IN BLOOD BY AUTOMATED COUNT: 14.7 % (ref 11.6–15.1)
GFR SERPL CREATININE-BSD FRML MDRD: 41 ML/MIN/1.73SQ M
GLUCOSE SERPL-MCNC: 105 MG/DL (ref 65–140)
GLUCOSE SERPL-MCNC: 107 MG/DL (ref 65–140)
GLUCOSE SERPL-MCNC: 114 MG/DL (ref 65–140)
GLUCOSE SERPL-MCNC: 167 MG/DL (ref 65–140)
GLUCOSE SERPL-MCNC: 85 MG/DL (ref 65–140)
GLUCOSE SERPL-MCNC: 91 MG/DL (ref 65–140)
HCT VFR BLD AUTO: 26.5 % (ref 36.5–49.3)
HGB BLD-MCNC: 9 G/DL (ref 12–17)
MCH RBC QN AUTO: 31.3 PG (ref 26.8–34.3)
MCHC RBC AUTO-ENTMCNC: 34 G/DL (ref 31.4–37.4)
MCV RBC AUTO: 92 FL (ref 82–98)
PLATELET # BLD AUTO: 225 THOUSANDS/UL (ref 149–390)
PMV BLD AUTO: 9.5 FL (ref 8.9–12.7)
POTASSIUM SERPL-SCNC: 4.3 MMOL/L (ref 3.5–5.3)
PROCALCITONIN SERPL-MCNC: 4.51 NG/ML
RBC # BLD AUTO: 2.88 MILLION/UL (ref 3.88–5.62)
SODIUM SERPL-SCNC: 143 MMOL/L (ref 136–145)
WBC # BLD AUTO: 15.35 THOUSAND/UL (ref 4.31–10.16)

## 2018-11-03 PROCEDURE — 94640 AIRWAY INHALATION TREATMENT: CPT

## 2018-11-03 PROCEDURE — 94003 VENT MGMT INPAT SUBQ DAY: CPT

## 2018-11-03 PROCEDURE — 99232 SBSQ HOSP IP/OBS MODERATE 35: CPT | Performed by: INTERNAL MEDICINE

## 2018-11-03 PROCEDURE — 85027 COMPLETE CBC AUTOMATED: CPT | Performed by: NURSE PRACTITIONER

## 2018-11-03 PROCEDURE — 82948 REAGENT STRIP/BLOOD GLUCOSE: CPT

## 2018-11-03 PROCEDURE — 84156 ASSAY OF PROTEIN URINE: CPT | Performed by: INTERNAL MEDICINE

## 2018-11-03 PROCEDURE — 82570 ASSAY OF URINE CREATININE: CPT | Performed by: INTERNAL MEDICINE

## 2018-11-03 PROCEDURE — C9113 INJ PANTOPRAZOLE SODIUM, VIA: HCPCS | Performed by: NURSE PRACTITIONER

## 2018-11-03 PROCEDURE — 99233 SBSQ HOSP IP/OBS HIGH 50: CPT | Performed by: INTERNAL MEDICINE

## 2018-11-03 PROCEDURE — 80048 BASIC METABOLIC PNL TOTAL CA: CPT | Performed by: INTERNAL MEDICINE

## 2018-11-03 PROCEDURE — 94660 CPAP INITIATION&MGMT: CPT

## 2018-11-03 PROCEDURE — 84145 PROCALCITONIN (PCT): CPT | Performed by: NURSE PRACTITIONER

## 2018-11-03 RX ORDER — METOPROLOL TARTRATE 100 MG/1
100 TABLET ORAL EVERY 12 HOURS SCHEDULED
COMMUNITY
End: 2018-11-16 | Stop reason: HOSPADM

## 2018-11-03 RX ORDER — SODIUM CHLORIDE FOR INHALATION 0.9 %
3 VIAL, NEBULIZER (ML) INHALATION
Status: DISCONTINUED | OUTPATIENT
Start: 2018-11-03 | End: 2018-11-12

## 2018-11-03 RX ORDER — FUROSEMIDE 10 MG/ML
40 INJECTION INTRAMUSCULAR; INTRAVENOUS ONCE
Status: DISCONTINUED | OUTPATIENT
Start: 2018-11-03 | End: 2018-11-03

## 2018-11-03 RX ORDER — FUROSEMIDE 10 MG/ML
60 INJECTION INTRAMUSCULAR; INTRAVENOUS ONCE
Status: COMPLETED | OUTPATIENT
Start: 2018-11-03 | End: 2018-11-03

## 2018-11-03 RX ORDER — OMEGA-3-ACID ETHYL ESTERS 1 G/1
2 CAPSULE, LIQUID FILLED ORAL 2 TIMES DAILY
COMMUNITY
End: 2018-12-05 | Stop reason: SDUPTHER

## 2018-11-03 RX ORDER — LOSARTAN POTASSIUM 100 MG/1
100 TABLET ORAL DAILY
COMMUNITY
End: 2018-11-16 | Stop reason: HOSPADM

## 2018-11-03 RX ORDER — ALLOPURINOL 300 MG/1
300 TABLET ORAL DAILY
COMMUNITY
End: 2018-11-21

## 2018-11-03 RX ORDER — HYDRALAZINE HYDROCHLORIDE 20 MG/ML
10 INJECTION INTRAMUSCULAR; INTRAVENOUS ONCE
Status: COMPLETED | OUTPATIENT
Start: 2018-11-03 | End: 2018-11-03

## 2018-11-03 RX ORDER — GLIMEPIRIDE 4 MG/1
8 TABLET ORAL
COMMUNITY
End: 2018-11-21

## 2018-11-03 RX ORDER — AMLODIPINE BESYLATE 5 MG/1
5 TABLET ORAL DAILY
Status: DISCONTINUED | OUTPATIENT
Start: 2018-11-03 | End: 2018-11-04

## 2018-11-03 RX ORDER — METOPROLOL TARTRATE 5 MG/5ML
5 INJECTION INTRAVENOUS ONCE
Status: COMPLETED | OUTPATIENT
Start: 2018-11-03 | End: 2018-11-03

## 2018-11-03 RX ORDER — LEVALBUTEROL 1.25 MG/.5ML
1.25 SOLUTION, CONCENTRATE RESPIRATORY (INHALATION)
Status: DISCONTINUED | OUTPATIENT
Start: 2018-11-03 | End: 2018-11-12

## 2018-11-03 RX ORDER — CHLORTHALIDONE 25 MG/1
25 TABLET ORAL DAILY
COMMUNITY
End: 2018-11-16 | Stop reason: HOSPADM

## 2018-11-03 RX ORDER — FUROSEMIDE 40 MG/1
40 TABLET ORAL AS NEEDED
COMMUNITY
End: 2018-11-16 | Stop reason: HOSPADM

## 2018-11-03 RX ORDER — AMLODIPINE BESYLATE 5 MG/1
5 TABLET ORAL DAILY
Status: DISCONTINUED | OUTPATIENT
Start: 2018-11-03 | End: 2018-11-03

## 2018-11-03 RX ORDER — HYDROXYZINE HYDROCHLORIDE 10 MG/1
10 TABLET, FILM COATED ORAL EVERY 6 HOURS PRN
COMMUNITY
End: 2018-11-21

## 2018-11-03 RX ORDER — ASPIRIN 81 MG/1
81 TABLET ORAL DAILY
COMMUNITY
End: 2018-11-16 | Stop reason: HOSPADM

## 2018-11-03 RX ORDER — SIMVASTATIN 40 MG
40 TABLET ORAL
COMMUNITY
End: 2018-11-16 | Stop reason: HOSPADM

## 2018-11-03 RX ORDER — MELATONIN
1000 DAILY
COMMUNITY
End: 2018-11-27 | Stop reason: SDUPTHER

## 2018-11-03 RX ORDER — AMLODIPINE BESYLATE 10 MG/1
10 TABLET ORAL DAILY
COMMUNITY
End: 2018-11-21

## 2018-11-03 RX ORDER — HYDRALAZINE HYDROCHLORIDE 20 MG/ML
5 INJECTION INTRAMUSCULAR; INTRAVENOUS ONCE
Status: COMPLETED | OUTPATIENT
Start: 2018-11-03 | End: 2018-11-03

## 2018-11-03 RX ORDER — AMLODIPINE BESYLATE 5 MG/1
5 TABLET ORAL DAILY
Status: DISCONTINUED | OUTPATIENT
Start: 2018-11-04 | End: 2018-11-03

## 2018-11-03 RX ORDER — MULTIVITAMIN
1 TABLET ORAL DAILY
COMMUNITY
End: 2022-05-13

## 2018-11-03 RX ADMIN — FENTANYL CITRATE 50 MCG: 50 INJECTION INTRAMUSCULAR; INTRAVENOUS at 01:02

## 2018-11-03 RX ADMIN — HEPARIN SODIUM 5000 UNITS: 5000 INJECTION INTRAVENOUS; SUBCUTANEOUS at 14:10

## 2018-11-03 RX ADMIN — HEPARIN SODIUM 5000 UNITS: 5000 INJECTION INTRAVENOUS; SUBCUTANEOUS at 05:24

## 2018-11-03 RX ADMIN — CEFEPIME HYDROCHLORIDE 1000 MG: 1 INJECTION, POWDER, FOR SOLUTION INTRAMUSCULAR; INTRAVENOUS at 20:23

## 2018-11-03 RX ADMIN — AMLODIPINE BESYLATE 5 MG: 5 TABLET ORAL at 11:46

## 2018-11-03 RX ADMIN — FENTANYL CITRATE 50 MCG: 50 INJECTION INTRAMUSCULAR; INTRAVENOUS at 06:57

## 2018-11-03 RX ADMIN — CHLORHEXIDINE GLUCONATE 0.12% ORAL RINSE 15 ML: 1.2 LIQUID ORAL at 20:23

## 2018-11-03 RX ADMIN — FUROSEMIDE 60 MG: 10 INJECTION, SOLUTION INTRAMUSCULAR; INTRAVENOUS at 05:25

## 2018-11-03 RX ADMIN — PANTOPRAZOLE SODIUM 40 MG: 40 INJECTION, POWDER, FOR SOLUTION INTRAVENOUS at 08:13

## 2018-11-03 RX ADMIN — HYDRALAZINE HYDROCHLORIDE 10 MG: 20 INJECTION INTRAMUSCULAR; INTRAVENOUS at 14:10

## 2018-11-03 RX ADMIN — METOPROLOL TARTRATE 5 MG: 5 INJECTION, SOLUTION INTRAVENOUS at 23:05

## 2018-11-03 RX ADMIN — CEFEPIME HYDROCHLORIDE 2000 MG: 2 INJECTION, POWDER, FOR SOLUTION INTRAVENOUS at 08:01

## 2018-11-03 RX ADMIN — CHLORHEXIDINE GLUCONATE 0.12% ORAL RINSE 15 ML: 1.2 LIQUID ORAL at 08:06

## 2018-11-03 RX ADMIN — LEVALBUTEROL HYDROCHLORIDE 1.25 MG: 1.25 SOLUTION, CONCENTRATE RESPIRATORY (INHALATION) at 19:22

## 2018-11-03 RX ADMIN — DEXTROSE MONOHYDRATE 25 ML: 25 INJECTION, SOLUTION INTRAVENOUS at 00:00

## 2018-11-03 RX ADMIN — ISODIUM CHLORIDE 3 ML: 0.03 SOLUTION RESPIRATORY (INHALATION) at 19:22

## 2018-11-03 RX ADMIN — HYDRALAZINE HYDROCHLORIDE 5 MG: 20 INJECTION INTRAMUSCULAR; INTRAVENOUS at 08:13

## 2018-11-03 RX ADMIN — HEPARIN SODIUM 5000 UNITS: 5000 INJECTION INTRAVENOUS; SUBCUTANEOUS at 21:29

## 2018-11-03 NOTE — PROGRESS NOTES
Progress Note - Critical Care   Ximena Peterson 70 y o  male MRN: [id number]  Unit/Bed#: ICU 03 Encounter: [id number]    Assessment/Plan:  1  Complete heart block status post temporary pacemaker  · Cardiology is following  Permanent pacemaker placement is on hold with possible infection  Echo showed normal EF  · He is primarily in his intrinsic sinus rhythm with intermittent pacing  · Lyme titers pending  2  Shock, suspect secondary to severe acidosis  · Significantly improved  Acidosis has resolved  Vasopressors were weaned off yesterday and patient's blood pressure remained stable  Continue to monitor hemodynamics closely  3  Acute hypoxic respiratory failure, multifactorial secondary to #1 with likely component of pulmonary edema  · Vent settings have been weaned down  He is almost 7 L fluid positive at this point  Will give IV Lasix 60 mg x1 dose this morning for diuresis to assist with liberation from the ventilator  Weaning trial this morning  4  Acute kidney injury likely secondary to ATN in the setting of hypotension  · Nephrology following  Creatinine is significanttly improved  Urine output has significantly improved and remains good  Continue to trend renal indices and monitor intake and output  5  Fevers of unclear etiology  · Source of possible infection is unclear  However with elevated white count, procalcitonin and fevers continue cefepime, d#2, empirically for now  Fevers are improving  Blood cultures are pending  6  DM type 2  · The patient had some episodes of hypoglycemia and was transitioned off insulin drip  Frequent Accu-Cheks  Avoid hypoglycemia  Continue SSI coverage with goal to maintain blood sugar 140-180      _____________________________________________________________________    HPI/24hr events:   T-max 100 8°  Hemodynamics improved and stable off pressors  No acute events overnight      Medications:    Current Facility-Administered Medications:  acetaminophen 650 mg Rectal Q4H PRN Jeris Kiang Spatzer, CRNP    cefepime 2,000 mg Intravenous Q12H PAT Bauman Last Rate: Stopped (11/02/18 2130)   chlorhexidine 15 mL Swish & Spit Q12H [address] Paula Mk PAT Ordonez    fentaNYL 50 mcg Intravenous Q2H PRN ElsPAT Cleary    heparin (porcine) 5,000 Units Subcutaneous Q8H [address] Genesis K PAT Ordonez    insulin lispro 1-6 Units Subcutaneous Q6H [address] Robert W Spatzer, CRNP    LORazepam 1 mg Intravenous Q4H PRN Jeris Kiang Spatzer, CRNP    pantoprazole 40 mg Intravenous Q24H [address] PAT Erickson               Physical exam:  Vitals: Body mass index is 36 19 kg/m²  Blood pressure 137/60, pulse 88, temperature 99 7 °F (37 6 °C), resp  rate (!) 23, height 5' 10" (1 778 m), weight 114 kg (252 lb 3 3 oz), SpO2 96 %  ,  Temp  Min: 94 3 °F (34 6 °C)  Max: 101 8 °F (38 8 °C)  IBW: 73 kg    SpO2: 96 %  SpO2 Activity: At Rest  O2 Device:  (intubation)      Intake/Output Summary (Last 24 hours) at 11/03/18 0603  Last data filed at 11/03/18 0524   Gross per 24 hour   Intake          2505 36 ml   Output             2785 ml   Net          -279 64 ml       Invasive/non-invasive ventilation settings:   Respiratory    Lab Data (Last 4 hours)    None         O2/Vent Data (Last 4 hours)      11/03 0306           Vent Mode AC/VC       Resp Rate (BPM) (BPM) 22       Vt (mL) (mL) 500       FIO2 (%) (%) 50       PEEP (cmH2O) (cmH2O) 5       MV 11 7                 Invasive Devices     Central Venous Catheter Line            CVC Central Lines 11/01/18 Right Internal jugular 1 day          Peripheral Intravenous Line            Peripheral IV Right Antecubital -- days    Peripheral IV 11/03/18 Left Arm less than 1 day          Line            Arterial Sheath Other (Comment) Right Femoral 1 day    Pacer Wires 1 day    Venous Sheath 6 Fr   Right Femoral 1 day          Drain            NG/OG/Enteral Tube Orogastric 18 Fr Center mouth 1 day    Urethral Catheter Non-latex 16 Fr  1 day          Airway ETT  7 5 mm 1 day    ETT  Cuffed 7 5 mm 1 day                  Physical Exam:  Gen:  Intubated, does not appear in acute distress  HEENT:  Atraumatic, normocephalic, extraocular movements intact, pupils 3 mm equal and reactive, oropharynx intubated  Neck:  Supple, trachea midline, no JVD, lymphadenopathy  Chest:  Diminished otherwise clear to auscultation  Cor:  Single T6/L9, 2/6 systolic murmur, no rubs or gallops, regular rate and rhythm with occasional pacing  Abd:  Obese, soft, nontender, nondistended, bowel sounds normoactive  Ext:  2+ bilateral lower extremity edema, 1+ bilateral upper extremity edema, no clubbing or cyanosis  Neuro:  Alerts on the ventilator, nods appropriately, follows commands, moves all extremities, no focal deficits  Skin:  Warm, dry      Diagnostic Data:  Lab: I have personally reviewed pertinent lab results     CBC:     Results from last 7 days  Lab Units 11/03/18  0433 11/02/18  0415 11/01/18  1630 11/01/18  1614 11/01/18  1215   WBC Thousand/uL 15 35* 21 58*  --   --  20 67*   HEMOGLOBIN g/dL 9 0* 8 8*  --   --  11 4*   HEMATOCRIT % 26 5* 26 9* <15*  --  35 6*   PLATELETS Thousands/uL 225 303  --  346 397*       CMP:     Results from last 7 days  Lab Units 11/03/18  0433 11/02/18  0415 11/01/18  2302 11/01/18  1630 11/01/18  1614 11/01/18  1215   POTASSIUM mmol/L 4 3 4 1 4 0  --  4 9 5 2   CHLORIDE mmol/L 111* 107 107  --  107 106   CO2 mmol/L 24 23 20*  --  17* 18*   CO2, I-STAT mmol/L  --   --   --  7*  --   --    BUN mg/dL 40* 43* 40*  --  39* 37*   CREATININE mg/dL 1 73* 3 06* 2 85*  --  2 36* 1 99*   CALCIUM mg/dL 7 9* 7 7* 7 6*  --  8 0* 8 8   ALK PHOS U/L  --   --   --   --  80 85   ALT U/L  --   --   --   --  61 33   AST U/L  --   --   --   --  65* 28   GLUCOSE, ISTAT mg/dl  --   --   --  142*  --   --      PT/INR:   No results found for: PT, INR,   Magnesium:     Results from last 7 days  Lab Units 11/01/18  1614 11/01/18  1215   MAGNESIUM mg/dL 2 4 2 3     Phosphorous: Results from last 7 days  Lab Units 11/01/18  1614   PHOSPHORUS mg/dL 8 1*       Microbiology:    Results from last 7 days  Lab Units 11/02/18  0836   INFLUENZA B PCR  None Detected   RSV PCR  None Detected   Blood cultures x2 pending    Imaging:  No new imaging    Cardiac lab/EKG/telemetry/ECHO:   Sinus rhythm on telemetry    VTE Prophylaxis:  Heparin, SCDs    Code Status: Level 1 - Full Code    Ruthe Pouch Spatzer, CRNP    Portions of the record may have been created with voice recognition software  Occasional wrong word or "sound a like" substitutions may have occurred due to the inherent limitations of voice recognition software  Read the chart carefully and recognize, using context, where substitutions have occurred

## 2018-11-03 NOTE — PROGRESS NOTES
Progress Note - Cardiology   Kimberley Lombardo 70 y o  male MRN: [id number]  Unit/Bed#: ICU 03 Encounter: [id number]      Assessment:     1  Fever/sepsis/resolved profound acidosis  2  Complete heart block status post temporary pacemaker-not pacing currently  3  Acute renal failure-improved  4  Mild aortic stenosis, some degree of mitral stenosis  5  Diabetes   6  Hypertension  7  Dyslipidemia    Discussion/Recommendations: Will likely need permanent pacemaker  Would consider Infectious Disease consult to evaluate timing of permanent pacemaker  Will add back half dose Norvasc      Subjective:  Just extubated-no specific complaints      Physical Exam:  GEN:  NAD  HEENT:  MMM, NCAT, pink conjunctiva, EOMI, nonicteric sclera  CV:  NO JVD/HJR, RR, NO M/R/G, +S1/S2, NO PARASTERNAL HEAVE/THRILL, NO LE EDEMA, NO HEPATIC SYSTOLIC PULSATION, WARM EXTREMITIES  RESP:  CTAB/L  ABD:  SOFT, NT, NO GROSS ORGANOMEGALY        Vitals:   /60   Pulse 96   Temp 99 3 °F (37 4 °C)   Resp 14   Ht 5' 10" (1 778 m)   Wt 114 kg (252 lb 3 3 oz)   SpO2 98%   BMI 36 19 kg/m²   Vitals:    11/02/18 0543 11/03/18 0536   Weight: 116 kg (254 lb 13 6 oz) 114 kg (252 lb 3 3 oz)       Intake/Output Summary (Last 24 hours) at 11/03/18 0833  Last data filed at 11/03/18 0800   Gross per 24 hour   Intake          2266 06 ml   Output             3650 ml   Net         -1383 94 ml         TELEMETRY:  Sinus rhythm  Lab Results:    Results from last 7 days  Lab Units 11/03/18  0433   WBC Thousand/uL 15 35*   HEMOGLOBIN g/dL 9 0*   HEMATOCRIT % 26 5*   PLATELETS Thousands/uL 225       Results from last 7 days  Lab Units 11/03/18  0433  11/01/18  1630 11/01/18  1614   POTASSIUM mmol/L 4 3  < >  --  4 9   CHLORIDE mmol/L 111*  < >  --  107   CO2 mmol/L 24  < >  --  17*   CO2, I-STAT mmol/L  --   --  7*  --    BUN mg/dL 40*  < >  --  39*   CREATININE mg/dL 1 73*  < >  --  2 36*   CALCIUM mg/dL 7 9*  < >  --  8 0*   ALK PHOS U/L  --   --   --  80 ALT U/L  --   --   --  61   AST U/L  --   --   --  65*   GLUCOSE, ISTAT mg/dl  --   --  142*  --    < > = values in this interval not displayed  Results from last 7 days  Lab Units 11/03/18  0433  11/01/18  1630   POTASSIUM mmol/L 4 3  < >  --    CHLORIDE mmol/L 111*  < >  --    CO2 mmol/L 24  < >  --    CO2, I-STAT mmol/L  --   --  7*   BUN mg/dL 40*  < >  --    CREATININE mg/dL 1 73*  < >  --    GLUCOSE, ISTAT mg/dl  --   --  142*   CALCIUM mg/dL 7 9*  < >  --    < > = values in this interval not displayed  Medications:    Current Facility-Administered Medications:     acetaminophen (TYLENOL) rectal suppository 650 mg, 650 mg, Rectal, Q4H PRN, Alinda Earnest Spatzer, CRNP, 650 mg at 11/02/18 0507    cefepime (MAXIPIME) 2,000 mg in dextrose 5 % 50 mL IVPB, 2,000 mg, Intravenous, Q12H, PAT Nunez, Last Rate: 100 mL/hr at 11/03/18 0801, 2,000 mg at 11/03/18 0801    chlorhexidine (PERIDEX) 0 12 % oral rinse 15 mL, 15 mL, Swish & Spit, Q12H [address], [address]T [address], 15 mL at 11/03/18 0806    heparin (porcine) subcutaneous injection 5,000 Units, 5,000 Units, Subcutaneous, Q8H [address], 5,000 Units at 11/03/18 0524 **AND** Platelet count, , , Once, PAT Olsen    insulin lispro (HumaLOG) 100 units/mL subcutaneous injection 1-6 Units, 1-6 Units, Subcutaneous, Q6H [address] **AND** Fingerstick Glucose (POCT), , , Q6H, Alinda Earnest Spatzer, CRNP    pantoprazole (PROTONIX) injection 40 mg, 40 mg, Intravenous, Q24H [address], PAT Olsen, 40 mg at 11/03/18 0813    Portions of the record may have been created with voice recognition software  Occasional wrong word or "sound a like" substitutions may have occurred due to the inherent limitations of voice recognition software  Read the chart carefully and recognize, using context, where substitutions have occurred

## 2018-11-03 NOTE — PLAN OF CARE
Problem: Potential for Falls  Goal: Patient will remain free of falls  INTERVENTIONS:  - Assess patient frequently for physical needs  -  Identify cognitive and physical deficits and behaviors that affect risk of falls  -  [place] fall precautions as indicated by assessment   - Educate patient/family on patient safety including physical limitations  - Instruct patient to call for assistance with activity based on assessment  - Modify environment to reduce risk of injury  - Consider OT/PT consult to assist with strengthening/mobility   Outcome: Progressing      Problem: Prexisting or High Potential for Compromised Skin Integrity  Goal: Skin integrity is maintained or improved  INTERVENTIONS:  - Identify patients at risk for skin breakdown  - Assess and monitor skin integrity  - Assess and monitor nutrition and hydration status  - Monitor labs (i e  albumin)  - Assess for incontinence   - Turn and reposition patient  - Assist with mobility/ambulation  - Relieve pressure over bony prominences  - Avoid friction and shearing  - Provide appropriate hygiene as needed including keeping skin clean and dry  - Evaluate need for skin moisturizer/barrier cream  - Collaborate with interdisciplinary team (i e  Nutrition, Rehabilitation, etc )   - Patient/family teaching   Outcome: Progressing      Problem: Nutrition/Hydration-ADULT  Goal: Nutrient/Hydration intake appropriate for improving, restoring or maintaining nutritional needs  Monitor and assess patient's nutrition/hydration status for malnutrition (ex- brittle hair, bruises, dry skin, pale skin and conjunctiva, muscle wasting, smooth red tongue, and disorientation)  Collaborate with interdisciplinary team and initiate plan and interventions as ordered  Monitor patient's weight and dietary intake as ordered or per policy  Utilize nutrition screening tool and intervene per policy   Determine patient's food preferences and provide high-protein, high-caloric foods as appropriate  INTERVENTIONS:  - Monitor oral intake, urinary output, labs, and treatment plans  - Assess nutrition and hydration status and recommend course of action  - Evaluate amount of meals eaten  - Assist patient with eating if necessary   - Allow adequate time for meals  - Recommend/ encourage appropriate diets, oral nutritional supplements, and vitamin/mineral supplements  - Order, calculate, and assess calorie counts as needed  - Recommend, monitor, and adjust tube feedings and TPN/PPN based on assessed needs  - Assess need for intravenous fluids  - Provide specific nutrition/hydration education as appropriate  - Include patient/family/caregiver in decisions related to nutrition   Outcome: Progressing      Problem: NEUROSENSORY - ADULT  Goal: Achieves stable or improved neurological status  INTERVENTIONS  - Monitor and report changes in neurological status  - Initiate measures to prevent increased intracranial pressure  - Maintain blood pressure and fluid volume within ordered parameters to optimize cerebral perfusion  - Monitor temperature, glucose, and sodium or any other associated labs   Initiate appropriate interventions as ordered  - Monitor for seizure activity   - Administer anti-seizure medications as ordered   Outcome: Progressing    Goal: Absence of seizures  INTERVENTIONS  - Monitor for seizure activity  - Administer anti-seizure medications as ordered  - Monitor neurological status   Outcome: Progressing    Goal: Remains free of injury related to seizures activity  INTERVENTIONS  - Maintain airway, patient safety  and administer oxygen as ordered  - Monitor patient for seizure activity, document and report duration and description of seizure to physician/advanced practitioner  - If seizure occurs,  ensure patient safety during seizure  - Reorient patient post seizure  - Seizure pads on all 4 side rails  - Instruct patient/family to notify RN of any seizure activity including if an aura is experienced  - Instruct patient/family to call for assistance with activity based on nursing assessment  - Administer anti-seizure medications as ordered  - Monitor fetal well being   Outcome: Progressing    Goal: Achieves maximal functionality and self care  INTERVENTIONS  - Monitor swallowing and airway patency with patient fatigue and changes in neurological status  - Encourage and assist patient to increase activity and self care with guidance from rehab services  - Encourage visually impaired, hearing impaired and aphasic patients to use assistive/communication devices   Outcome: Progressing      Problem: CARDIOVASCULAR - ADULT  Goal: Maintains optimal cardiac output and hemodynamic stability  INTERVENTIONS:  - Monitor I/O, vital signs and rhythm  - Monitor for S/S and trends of decreased cardiac output i e  bleeding, hypotension  - Administer and titrate ordered vasoactive medications to optimize hemodynamic stability  - Assess quality of pulses, skin color and temperature  - Assess for signs of decreased coronary artery perfusion - ex   Angina  - Instruct patient to report change in severity of symptoms   Outcome: Progressing    Goal: Absence of cardiac dysrhythmias or at baseline rhythm  INTERVENTIONS:  - Continuous cardiac monitoring, monitor vital signs, obtain 12 lead EKG if indicated  - Administer antiarrhythmic and heart rate control medications as ordered  - Monitor electrolytes and administer replacement therapy as ordered   Outcome: Progressing      Problem: RESPIRATORY - ADULT  Goal: Achieves optimal ventilation and oxygenation  INTERVENTIONS:  - Assess for changes in respiratory status  - Assess for changes in mentation and behavior  - Position to facilitate oxygenation and minimize respiratory effort  - Oxygen administration by appropriate delivery method based on oxygen saturation (per order) or ABGs  - Initiate smoking cessation education as indicated  - Encourage broncho-pulmonary hygiene including cough, deep breathe, Incentive Spirometry  - Assess the need for suctioning and aspirate as needed  - Assess and instruct to report SOB or any respiratory difficulty  - Respiratory Therapy support as indicated   Outcome: Progressing      Problem: GENITOURINARY - ADULT  Goal: Maintains or returns to baseline urinary function  INTERVENTIONS:  - Assess urinary function  - Encourage oral fluids to ensure adequate hydration  - Administer IV fluids as ordered to ensure adequate hydration  - Administer ordered medications as needed  - Offer frequent toileting  - Follow urinary retention protocol if ordered   Outcome: Progressing    Goal: Absence of urinary retention  INTERVENTIONS:  - Assess patient's ability to void and empty bladder  - Monitor I/O  - Bladder scan as needed  - Discuss with physician/AP medications to alleviate retention as needed  - Discuss catheterization for long term situations as appropriate   Outcome: Progressing    Goal: Urinary catheter remains patent  INTERVENTIONS:  - Assess patency of urinary catheter  - If patient has a chronic ojeda, consider changing catheter if non-functioning  - Follow guidelines for intermittent irrigation of non-functioning urinary catheter   Outcome: Progressing

## 2018-11-03 NOTE — PROGRESS NOTES
NEPHROLOGY PROGRESS NOTE   Trevor English 70 y o  male MRN: [id number]  Unit/Bed#: ICU 03 Encounter: [id number]  Reason for Consult: ELZBIETA    ASSESSMENT/PLAN:  1  Acute Kidney Injury, POA- secondary to complete heart block with bradycardia affecting hemodynamics leading to ATN  - creatinine now improving after peak creatinine of 3 06 on 11/2   - UA: dirty  - urine output acceptable  - unknown baseline creatinine  2  Complete Heart Block- temporary pacemaker in place  - cardiology on board  3  Acute Hypoxic Respiratory Failure- extubation today  4  Fever of unclear etiology- per primary team  5  Shock- severe acidosis, per primary team  7  Hypertension- cardiology added amlodipine back today  8   Metabolic Acidosis- now resolved, secondary to severe lactic acid    Disposition:  Agree with lasix x1    SUBJECTIVE:  Patient awake and alert on ventilator, critical care plans to extubate today    OBJECTIVE:  Current Weight: Weight - Scale: 114 kg (252 lb 3 3 oz)  Vitals:    11/03/18 0700 11/03/18 0800 11/03/18 0838 11/03/18 0900   BP:       BP Location:       Pulse:  96  100   Resp:  14  18   Temp:  99 3 °F (37 4 °C)  99 3 °F (37 4 °C)   TempSrc:       SpO2: 97% 98% 92% 91%   Weight:       Height:           Intake/Output Summary (Last 24 hours) at 11/03/18 3911  Last data filed at 11/03/18 0900   Gross per 24 hour   Intake          2243 73 ml   Output             3975 ml   Net         -1731 27 ml     General: NAD  Skin: no rash  HEENT: normocephalic  Neck: supple  Chest: decreased  Heart: RRR  Abdomen: soft nt nd  Extremities: + edema  Neuro: alert awake  Psych: mood and affect appropriate    Medications:    Current Facility-Administered Medications:     acetaminophen (TYLENOL) rectal suppository 650 mg, 650 mg, Rectal, Q4H PRN, Antonetta Gimenez Spatzer, CRNP, 650 mg at 11/02/18 0507    amLODIPine (NORVASC) tablet 5 mg, 5 mg, Oral, Daily, Jt Slaughter MD    cefepime (MAXIPIME) 2,000 mg in dextrose 5 % 50 mL IVPB, 2,000 mg, Intravenous, Q12H, PAT Aguayo, Stopped at 11/03/18 0831    chlorhexidine (PERIDEX) 0 12 % oral rinse 15 mL, 15 mL, Swish & Spit, Q12H [address], NoyPAT Nova, 15 mL at 11/03/18 0806    heparin (porcine) subcutaneous injection 5,000 Units, 5,000 Units, Subcutaneous, Q8H [address], 5,000 Units at 11/03/18 0524 **AND** Platelet count, , , Once, Genesis PAT Mcgregor    insulin lispro (HumaLOG) 100 units/mL subcutaneous injection 1-6 Units, 1-6 Units, Subcutaneous, Q6H [address] **AND** Fingerstick Glucose (POCT), , , Q6H, Cyd Belton Spatzer, CRNP    pantoprazole (PROTONIX) injection 40 mg, 40 mg, Intravenous, Q24H [address], PAT Tinsley, 40 mg at 11/03/18 0813    Laboratory Results:    Results from last 7 days  Lab Units 11/03/18  0433 11/02/18  0415 11/01/18  2302 11/01/18  1630 11/01/18  1614 11/01/18  1215   WBC Thousand/uL 15 35* 21 58*  --   --   --  20 67*   HEMOGLOBIN g/dL 9 0* 8 8*  --   --   --  11 4*   HEMATOCRIT % 26 5* 26 9*  --  <15*  --  35 6*   PLATELETS Thousands/uL 225 303  --   --  346 397*   POTASSIUM mmol/L 4 3 4 1 4 0  --  4 9 5 2   CHLORIDE mmol/L 111* 107 107  --  107 106   CO2 mmol/L 24 23 20*  --  17* 18*   CO2, I-STAT mmol/L  --   --   --  7*  --   --    BUN mg/dL 40* 43* 40*  --  39* 37*   CREATININE mg/dL 1 73* 3 06* 2 85*  --  2 36* 1 99*   CALCIUM mg/dL 7 9* 7 7* 7 6*  --  8 0* 8 8   MAGNESIUM mg/dL  --   --   --   --  2 4 2 3   PHOSPHORUS mg/dL  --   --   --   --  8 1*  --    GLUCOSE, ISTAT mg/dl  --   --   --  142*  --   --

## 2018-11-04 ENCOUNTER — APPOINTMENT (INPATIENT)
Dept: RADIOLOGY | Facility: HOSPITAL | Age: 63
DRG: 242 | End: 2018-11-04
Payer: MEDICARE

## 2018-11-04 PROBLEM — I48.0 PAROXYSMAL ATRIAL FIBRILLATION (HCC): Status: ACTIVE | Noted: 2018-11-04

## 2018-11-04 PROBLEM — J96.01 ACUTE RESPIRATORY FAILURE WITH HYPOXIA (HCC): Status: ACTIVE | Noted: 2018-11-04

## 2018-11-04 LAB
ANION GAP SERPL CALCULATED.3IONS-SCNC: 11 MMOL/L (ref 4–13)
B BURGDOR IGG SER IA-ACNC: 0.16
B BURGDOR IGM SER IA-ACNC: 0.3
BUN SERPL-MCNC: 34 MG/DL (ref 5–25)
CALCIUM SERPL-MCNC: 8.3 MG/DL (ref 8.3–10.1)
CHLORIDE SERPL-SCNC: 106 MMOL/L (ref 100–108)
CO2 SERPL-SCNC: 23 MMOL/L (ref 21–32)
CREAT SERPL-MCNC: 1.3 MG/DL (ref 0.6–1.3)
CREAT UR-MCNC: 124 MG/DL
ERYTHROCYTE [DISTWIDTH] IN BLOOD BY AUTOMATED COUNT: 14.4 % (ref 11.6–15.1)
GFR SERPL CREATININE-BSD FRML MDRD: 57 ML/MIN/1.73SQ M
GLUCOSE SERPL-MCNC: 140 MG/DL (ref 65–140)
GLUCOSE SERPL-MCNC: 146 MG/DL (ref 65–140)
GLUCOSE SERPL-MCNC: 167 MG/DL (ref 65–140)
GLUCOSE SERPL-MCNC: 168 MG/DL (ref 65–140)
HCT VFR BLD AUTO: 27.7 % (ref 36.5–49.3)
HGB BLD-MCNC: 9 G/DL (ref 12–17)
MAGNESIUM SERPL-MCNC: 2 MG/DL (ref 1.6–2.6)
MCH RBC QN AUTO: 30.2 PG (ref 26.8–34.3)
MCHC RBC AUTO-ENTMCNC: 32.5 G/DL (ref 31.4–37.4)
MCV RBC AUTO: 93 FL (ref 82–98)
PLATELET # BLD AUTO: 215 THOUSANDS/UL (ref 149–390)
PMV BLD AUTO: 9 FL (ref 8.9–12.7)
POTASSIUM SERPL-SCNC: 4.4 MMOL/L (ref 3.5–5.3)
PROCALCITONIN SERPL-MCNC: 1.6 NG/ML
PROCALCITONIN SERPL-MCNC: 2.13 NG/ML
PROT UR-MCNC: 396 MG/DL
PROT/CREAT UR: 3.19 MG/G{CREAT} (ref 0–0.1)
RBC # BLD AUTO: 2.98 MILLION/UL (ref 3.88–5.62)
SODIUM SERPL-SCNC: 140 MMOL/L (ref 136–145)
WBC # BLD AUTO: 16.75 THOUSAND/UL (ref 4.31–10.16)

## 2018-11-04 PROCEDURE — 71045 X-RAY EXAM CHEST 1 VIEW: CPT

## 2018-11-04 PROCEDURE — 83735 ASSAY OF MAGNESIUM: CPT | Performed by: NURSE PRACTITIONER

## 2018-11-04 PROCEDURE — 99232 SBSQ HOSP IP/OBS MODERATE 35: CPT | Performed by: INTERNAL MEDICINE

## 2018-11-04 PROCEDURE — C9113 INJ PANTOPRAZOLE SODIUM, VIA: HCPCS | Performed by: NURSE PRACTITIONER

## 2018-11-04 PROCEDURE — 82948 REAGENT STRIP/BLOOD GLUCOSE: CPT

## 2018-11-04 PROCEDURE — 94640 AIRWAY INHALATION TREATMENT: CPT

## 2018-11-04 PROCEDURE — 93005 ELECTROCARDIOGRAM TRACING: CPT

## 2018-11-04 PROCEDURE — 84145 PROCALCITONIN (PCT): CPT | Performed by: NURSE PRACTITIONER

## 2018-11-04 PROCEDURE — 99291 CRITICAL CARE FIRST HOUR: CPT | Performed by: INTERNAL MEDICINE

## 2018-11-04 PROCEDURE — 85027 COMPLETE CBC AUTOMATED: CPT | Performed by: NURSE PRACTITIONER

## 2018-11-04 PROCEDURE — 94660 CPAP INITIATION&MGMT: CPT

## 2018-11-04 PROCEDURE — 80048 BASIC METABOLIC PNL TOTAL CA: CPT | Performed by: PHYSICIAN ASSISTANT

## 2018-11-04 RX ORDER — HYDRALAZINE HYDROCHLORIDE 20 MG/ML
5 INJECTION INTRAMUSCULAR; INTRAVENOUS ONCE
Status: COMPLETED | OUTPATIENT
Start: 2018-11-04 | End: 2018-11-04

## 2018-11-04 RX ORDER — FUROSEMIDE 10 MG/ML
40 INJECTION INTRAMUSCULAR; INTRAVENOUS ONCE
Status: COMPLETED | OUTPATIENT
Start: 2018-11-04 | End: 2018-11-04

## 2018-11-04 RX ORDER — AMLODIPINE BESYLATE 5 MG/1
5 TABLET ORAL ONCE
Status: COMPLETED | OUTPATIENT
Start: 2018-11-04 | End: 2018-11-04

## 2018-11-04 RX ORDER — FUROSEMIDE 10 MG/ML
40 INJECTION INTRAMUSCULAR; INTRAVENOUS
Status: CANCELLED | OUTPATIENT
Start: 2018-11-04

## 2018-11-04 RX ORDER — LOSARTAN POTASSIUM 50 MG/1
50 TABLET ORAL DAILY
Status: DISCONTINUED | OUTPATIENT
Start: 2018-11-04 | End: 2018-11-05

## 2018-11-04 RX ORDER — LOSARTAN POTASSIUM 50 MG/1
100 TABLET ORAL DAILY
Status: CANCELLED | OUTPATIENT
Start: 2018-11-04

## 2018-11-04 RX ORDER — AMLODIPINE BESYLATE 5 MG/1
10 TABLET ORAL DAILY
Status: DISCONTINUED | OUTPATIENT
Start: 2018-11-05 | End: 2018-11-07

## 2018-11-04 RX ORDER — METOPROLOL TARTRATE 5 MG/5ML
5 INJECTION INTRAVENOUS EVERY 6 HOURS PRN
Status: DISCONTINUED | OUTPATIENT
Start: 2018-11-04 | End: 2018-11-04

## 2018-11-04 RX ADMIN — INSULIN LISPRO 1 UNITS: 100 INJECTION, SOLUTION INTRAVENOUS; SUBCUTANEOUS at 17:56

## 2018-11-04 RX ADMIN — LEVALBUTEROL HYDROCHLORIDE 1.25 MG: 1.25 SOLUTION, CONCENTRATE RESPIRATORY (INHALATION) at 07:35

## 2018-11-04 RX ADMIN — SODIUM CHLORIDE 5 MG/HR: 0.9 INJECTION, SOLUTION INTRAVENOUS at 16:09

## 2018-11-04 RX ADMIN — HEPARIN SODIUM 5000 UNITS: 5000 INJECTION INTRAVENOUS; SUBCUTANEOUS at 21:02

## 2018-11-04 RX ADMIN — HEPARIN SODIUM 5000 UNITS: 5000 INJECTION INTRAVENOUS; SUBCUTANEOUS at 13:03

## 2018-11-04 RX ADMIN — CHLORHEXIDINE GLUCONATE 0.12% ORAL RINSE 15 ML: 1.2 LIQUID ORAL at 08:18

## 2018-11-04 RX ADMIN — INSULIN LISPRO 1 UNITS: 100 INJECTION, SOLUTION INTRAVENOUS; SUBCUTANEOUS at 12:35

## 2018-11-04 RX ADMIN — AMLODIPINE BESYLATE 5 MG: 5 TABLET ORAL at 08:17

## 2018-11-04 RX ADMIN — INSULIN LISPRO 1 UNITS: 100 INJECTION, SOLUTION INTRAVENOUS; SUBCUTANEOUS at 21:09

## 2018-11-04 RX ADMIN — SODIUM CHLORIDE 5 MG/HR: 0.9 INJECTION, SOLUTION INTRAVENOUS at 11:21

## 2018-11-04 RX ADMIN — CEFEPIME HYDROCHLORIDE 2000 MG: 2 INJECTION, POWDER, FOR SOLUTION INTRAVENOUS at 21:02

## 2018-11-04 RX ADMIN — FUROSEMIDE 40 MG: 10 INJECTION, SOLUTION INTRAMUSCULAR; INTRAVENOUS at 05:40

## 2018-11-04 RX ADMIN — PANTOPRAZOLE SODIUM 40 MG: 40 INJECTION, POWDER, FOR SOLUTION INTRAVENOUS at 08:18

## 2018-11-04 RX ADMIN — SODIUM CHLORIDE 5 MG/HR: 0.9 INJECTION, SOLUTION INTRAVENOUS at 21:12

## 2018-11-04 RX ADMIN — HEPARIN SODIUM 5000 UNITS: 5000 INJECTION INTRAVENOUS; SUBCUTANEOUS at 05:40

## 2018-11-04 RX ADMIN — HYDRALAZINE HYDROCHLORIDE 5 MG: 20 INJECTION INTRAMUSCULAR; INTRAVENOUS at 09:48

## 2018-11-04 RX ADMIN — AMLODIPINE BESYLATE 5 MG: 5 TABLET ORAL at 09:48

## 2018-11-04 RX ADMIN — LEVALBUTEROL HYDROCHLORIDE 1.25 MG: 1.25 SOLUTION, CONCENTRATE RESPIRATORY (INHALATION) at 18:49

## 2018-11-04 RX ADMIN — ISODIUM CHLORIDE 3 ML: 0.03 SOLUTION RESPIRATORY (INHALATION) at 18:49

## 2018-11-04 RX ADMIN — ISODIUM CHLORIDE 3 ML: 0.03 SOLUTION RESPIRATORY (INHALATION) at 07:35

## 2018-11-04 RX ADMIN — LOSARTAN POTASSIUM 50 MG: 50 TABLET ORAL at 13:03

## 2018-11-04 RX ADMIN — CEFEPIME HYDROCHLORIDE 1000 MG: 1 INJECTION, POWDER, FOR SOLUTION INTRAMUSCULAR; INTRAVENOUS at 08:18

## 2018-11-04 NOTE — PROGRESS NOTES
Progress Note - Critical Care   Bill Schuler 70 y o  male MRN: [id number]  Unit/Bed#: ICU 03 Encounter: [id number]    Assessment/Plan:  1  Complete heart block status post temporary pacemaker  1  Cardiology following  Will likely need permanent pacemaker  2  Atrial fibrillation with RVR  1  Lopressor 5 mg IV Q6h prn for heart rate >140  2  Cardiology following  Would appreciate cardiology recommendations  3  Acute hypoxic respiratory failure secondary to complete heart block and pulmonary edema  1  Extubated yesterday and is doing well in nasal cannula  2  Lasix 40 mg x1 dose  4  Acute renal failure: improved  1  Nephrology following  2  Creatinine 1 3  Urine output remains adequate  5  Fever and leukocytosis   1  Blood cultures remains negative  procalcitonin remains elevated  2  Continue Cefepime 1g q12h  6  DM type II  1  accu checks and SSI        Critical Care Time:   Documented critical care time excludes any procedures documented elsewhere  It also excludes any family updates    _____________________________________________________________________    HPI/24hr events:   No events overnight   Extubated yesterday and doing well    Medications:    Current Facility-Administered Medications:  acetaminophen 650 mg Rectal Q4H PRN Zandra Destine Spatzer, CRNP    amLODIPine 5 mg Oral Daily Charles Sonday, PAT    cefepime 1,000 mg Intravenous Q12H PAT Abel Last Rate: Stopped (11/03/18 2114)   chlorhexidine 15 mL Swish & Spit Q12H [address] [place]ole[place] Every PAT Ordonez    furosemide 40 mg Intravenous Once PAT Kim    heparin (porcine) 5,000 Units Subcutaneous Q8H [address] Genesis K PAT Ordonez    insulin lispro 1-6 Units Subcutaneous 4x Daily (AC & HS) Glenny Buckner MD    levalbuterol 1 25 mg Nebulization TID PAT Abel    metoprolol 5 mg Intravenous Q6H PRN PAT Kim    pantoprazole 40 mg Intravenous Q24H [address] Genesis K Bilofsky, CRNP    sodium chloride 3 mL Nebulization TID Glenny Buckner MD Physical exam:  Vitals: Body mass index is 36 19 kg/m²  Blood pressure 137/60, pulse 80, temperature 99 7 °F (37 6 °C), resp  rate (!) 23, height 5' 10" (1 778 m), weight 114 kg (252 lb 3 3 oz), SpO2 99 %  ,  Temp  Min: 94 3 °F (34 6 °C)  Max: 101 8 °F (38 8 °C)  IBW: 73 kg    SpO2: 99 %  SpO2 Activity: At Rest  O2 Device: Nasal cannula      Intake/Output Summary (Last 24 hours) at 11/04/18 0528  Last data filed at 11/04/18 0433   Gross per 24 hour   Intake              700 ml   Output             3750 ml   Net            -3050 ml       Invasive/non-invasive ventilation settings:   Respiratory    Lab Data (Last 4 hours)    None         O2/Vent Data (Last 4 hours)      11/04 0247          Non-Invasive Ventilation Mode BiPAP                 Invasive Devices     Central Venous Catheter Line            CVC Central Lines 11/01/18 Right Internal jugular 2 days          Peripheral Intravenous Line            Peripheral IV 11/01/18 Right Antecubital 3 days    Peripheral IV 11/03/18 Left Arm 1 day          Line            Arterial Sheath Other (Comment) Right Femoral 2 days    Pacer Wires 2 days    Venous Sheath 6 Fr  Right Femoral 2 days          Drain            Urethral Catheter Non-latex 16 Fr  2 days                  Physical Exam:  Gen: No acute distress  HEENT: NC/AT PERRLA EOMI  Neck: supple, no JVD, trachea midline, no adenopathy  Chest: Coarse  Cor: +murmur, clear s1 and S2  Abd: soft NT/ND +BS  Ext: +2 edema  Neuro: non-focal  Skin: W/D      Diagnostic Data:  Lab: I have personally reviewed pertinent lab results     CBC:     Results from last 7 days  Lab Units 11/04/18  0433 11/03/18  0433 11/02/18  0415   WBC Thousand/uL 16 75* 15 35* 21 58*   HEMOGLOBIN g/dL 9 0* 9 0* 8 8*   HEMATOCRIT % 27 7* 26 5* 26 9*   PLATELETS Thousands/uL 215 225 303       CMP:     Results from last 7 days  Lab Units 11/04/18  0433 11/03/18  0433 11/02/18  0415  11/01/18  1630 11/01/18  1614 11/01/18  1215   POTASSIUM mmol/L 4 4 4 3 4 1  < >  --  4 9 5 2   CHLORIDE mmol/L 106 111* 107  < >  --  107 106   CO2 mmol/L 23 24 23  < >  --  17* 18*   CO2, I-STAT mmol/L  --   --   --   --  7*  --   --    BUN mg/dL 34* 40* 43*  < >  --  39* 37*   CREATININE mg/dL 1 30 1 73* 3 06*  < >  --  2 36* 1 99*   CALCIUM mg/dL 8 3 7 9* 7 7*  < >  --  8 0* 8 8   ALK PHOS U/L  --   --   --   --   --  80 85   ALT U/L  --   --   --   --   --  61 33   AST U/L  --   --   --   --   --  65* 28   GLUCOSE, ISTAT mg/dl  --   --   --   --  142*  --   --    < > = values in this interval not displayed  PT/INR:   No results found for: PT, INR,   Magnesium:   Results from last 7 days  Lab Units 11/01/18  1614 11/01/18  1215   MAGNESIUM mg/dL 2 4 2 3     Phosphorous:   Results from last 7 days  Lab Units 11/01/18  1614   PHOSPHORUS mg/dL 8 1*       Microbiology:    Results from last 7 days  Lab Units 11/02/18  0836 11/01/18  2309 11/01/18  2305   BLOOD CULTURE   --  No Growth at 24 hrs  No Growth at 24 hrs  INFLUENZA B PCR  None Detected  --   --    RSV PCR  None Detected  --   --        Imaging:  none    Cardiac lab/EKG/telemetry/ECHO:   Atrial fibrillation    VTE Prophylaxis: heparin    Code Status: Level 1 - Full Code    PAT Bauman    Portions of the record may have been created with voice recognition software  Occasional wrong word or "sound a like" substitutions may have occurred due to the inherent limitations of voice recognition software  Read the chart carefully and recognize, using context, where substitutions have occurred

## 2018-11-04 NOTE — MEDICAL STUDENT
Progress Note - Critical Care   Jennifer Arango 70 y o  male MRN: [id number]  Unit/Bed#: ICU 03 Encounter: [id number]    Assessment/Plan:  1  Complete heart block status post temporary pacemaker placement   · Cards following, will need permanent pacemaker places   2  A-fib with RVR  · Patient received 5mg IV lopressor last night with improvement in heart rate  · Will hold off on further beta blockers for right now  3  Acute hypoxic respiratory failure secondary to complete heart block and pulmonary edema   · Patient was extubated yesterday, requiring bipap at HS and prn  · Maintain O2 sat above 88%  · Patient received 40 Iv lasix last night and diuresis 2L   · Incentive spirometry Q1hr    · CXR pending  4  Acute renal failure   · Creatinine improving 1 3   · Diuresis 2L  · Trend renal indices   5  Fever/leukocytosis   · Changed Cefepime to 2g BID  · T-max was 100 4  · WBCs trending up 16 75  · Trend WBCs and temps  · Procalcitonin trending down 2 13 today  · Blood cultures negative for 48 hrs   · Source of possible infection is unclear  6  Type 2 diabetes  · BS controlled on sliding scale insulin  · Blood sugar goal 140-180  7  Obstructive sleep apnea  · Continue bipap at HS and prn for naps  · Patient will need sleep study out patient       Critical Care Time: 35  Documented critical care time excludes any procedures documented elsewhere  It also excludes any family updates    _____________________________________________________________________    HPI/24hr events:   Patient went into a-fib with RVR over night but is now predominately paced on the monitor       Medications:    Current Facility-Administered Medications:  acetaminophen 650 mg Rectal Q4H PRN Farris Failing Spatzer, CRNP    [START ON 11/5/2018] amLODIPine 10 mg Oral Daily Charles SondayPAT    cefepime 2,000 mg Intravenous Q12H Charles SondayPAT    chlorhexidine 15 mL Swish & Spit Q12H [address] Orren NipPAT Birch    heparin (porcine) 5,000 Units Subcutaneous Q8H [address] Genessi K Bilofsky, CRNP    insulin lispro 1-6 Units Subcutaneous 4x Daily (AC & HS) Elva Vera MD    levalbuterol 1 25 mg Nebulization TID PAT Stephens    losartan 50 mg Oral Daily Donato Johns, PAT    niCARdipine 1-15 mg/hr Intravenous Titrated ThomasODALIS SoloNP Last Rate: 7 5 mg/hr (11/04/18 1154)   pantoprazole 40 mg Intravenous Q24H [address] Genesis K Bilofsky, CRNP    sodium chloride 3 mL Nebulization TID Elva Vera MD          niCARdipine 1-15 mg/hr Last Rate: 7 5 mg/hr (11/04/18 1154)         Physical exam:  Vitals: Body mass index is 35 93 kg/m²  Blood pressure (!) 190/63, pulse (!) 108, temperature 99 3 °F (37 4 °C), resp  rate (!) 28, height 5' 10" (1 778 m), weight 114 kg (250 lb 7 1 oz), SpO2 98 % ,  Temp  Min: 94 3 °F (34 6 °C)  Max: 101 8 °F (38 8 °C)  IBW: 73 kg    SpO2: 98 %  SpO2 Activity: At Rest  O2 Device: Nasal cannula      Intake/Output Summary (Last 24 hours) at 11/04/18 1236  Last data filed at 11/04/18 1221   Gross per 24 hour   Intake              140 ml   Output             3355 ml   Net            -3215 ml       Invasive/non-invasive ventilation settings:   Respiratory    Lab Data (Last 4 hours)    None         O2/Vent Data (Last 4 hours)    None              Invasive Devices     Peripheral Intravenous Line            Peripheral IV 11/01/18 Right Antecubital 3 days    Peripheral IV 11/03/18 Left Arm 1 day          Line            Arterial Sheath Other (Comment) Right Femoral 2 days    Pacer Wires 2 days    Venous Sheath 6 Fr  Right Femoral 2 days          Drain            Urethral Catheter Non-latex 16 Fr  2 days                  Physical Exam:  Gen: No acute distress   HEENT: Atraumatic, normocephalic, pupils B/L 3 and reactive  Neck: Supple, trachea midline, no JVD,   Chest: Lungs B/L diminished and course, patient reports pain with deep inspirations   Cor: S1/S2, no murmur, rubs or gallops Temporary pacer in place in R groin   Rate set to 80 and MA 10  Abd: Soft, obese, non tender, non distended, normal bowel sounds  Ext: +2 pitting edema B/L LEs, no clubbing or cyanosis   Neuro: Alert and oriented, follows commands, no focal deficits   Skin: Warm, dry and no break down       Diagnostic Data:  Lab: I have personally reviewed pertinent lab results  CBC:     Results from last 7 days  Lab Units 11/04/18 0433 11/03/18  0433 11/02/18  0415   WBC Thousand/uL 16 75* 15 35* 21 58*   HEMOGLOBIN g/dL 9 0* 9 0* 8 8*   HEMATOCRIT % 27 7* 26 5* 26 9*   PLATELETS Thousands/uL 215 225 303       CMP:     Results from last 7 days  Lab Units 11/04/18  0433 11/03/18  0433 11/02/18  0415  11/01/18  1630 11/01/18  1614 11/01/18  1215   POTASSIUM mmol/L 4 4 4 3 4 1  < >  --  4 9 5 2   CHLORIDE mmol/L 106 111* 107  < >  --  107 106   CO2 mmol/L 23 24 23  < >  --  17* 18*   CO2, I-STAT mmol/L  --   --   --   --  7*  --   --    BUN mg/dL 34* 40* 43*  < >  --  39* 37*   CREATININE mg/dL 1 30 1 73* 3 06*  < >  --  2 36* 1 99*   CALCIUM mg/dL 8 3 7 9* 7 7*  < >  --  8 0* 8 8   ALK PHOS U/L  --   --   --   --   --  80 85   ALT U/L  --   --   --   --   --  61 33   AST U/L  --   --   --   --   --  65* 28   GLUCOSE, ISTAT mg/dl  --   --   --   --  142*  --   --    < > = values in this interval not displayed  PT/INR:   No results found for: PT, INR,   Magnesium:   Results from last 7 days  Lab Units 11/04/18  0433 11/01/18  1614 11/01/18  1215   MAGNESIUM mg/dL 2 0 2 4 2 3     Phosphorous:   Results from last 7 days  Lab Units 11/01/18  1614   PHOSPHORUS mg/dL 8 1*       Microbiology:    Results from last 7 days  Lab Units 11/02/18  0836 11/01/18 2309 11/01/18 2305   BLOOD CULTURE   --  No Growth at 48 hrs  No Growth at 48 hrs     INFLUENZA B PCR  None Detected  --   --    RSV PCR  None Detected  --   --        Imaging:  CXR- pending    Cardiac lab/EKG/telemetry/ECHO:   Paced rhythm     VTE Prophylaxis: SQ heparin/venodynes     Code Status: Level 1 - Full Code    Liz Gaxiola, RN    Portions of the record may have been created with voice recognition software  Occasional wrong word or "sound a like" substitutions may have occurred due to the inherent limitations of voice recognition software  Read the chart carefully and recognize, using context, where substitutions have occurred

## 2018-11-04 NOTE — PROGRESS NOTES
NEPHROLOGY PROGRESS NOTE   Deng Ya 70 y o  male MRN: [id number]  Unit/Bed#: ICU 03 Encounter: [id number]  Reason for Consult: ELZBIETA    ASSESSMENT/PLAN:  1  Acute Kidney Injury, POA- secondary to complete heart block with bradycardia affecting hemodynamics leading to ATN  - creatinine now improving after peak creatinine of 3 06 on 11/2  - UA: dirty  - urine output acceptable  - unknown baseline creatinine  - volume status: overloaded, IV lasix as needed per critical care  - good urine output  2  Complete Heart Block- temporary pacemaker in place  - cardiology on board  - eventual plan for permanent pacemaker  3  Acute Hypoxic Respiratory Failure- extubated 11/3  4  Fever of unclear etiology- per primary team  5  Shock- severe acidosis, per primary team  7  Hypertension- cardiology added amlodipine back 11/3  8  Metabolic Acidosis- now resolved, secondary to severe lactic acid    Discussed with critical care    SUBJECTIVE:  Patient feeling well  No acute complaints      OBJECTIVE:  Current Weight: Weight - Scale: 114 kg (250 lb 7 1 oz)  Vitals:    11/04/18 0700 11/04/18 0735 11/04/18 0800 11/04/18 0817   BP:    170/56   BP Location:       Pulse: 84  80    Resp: 22  (!) 23    Temp: 99 3 °F (37 4 °C)  99 3 °F (37 4 °C)    TempSrc:       SpO2: 98% 98% 99%    Weight:       Height:           Intake/Output Summary (Last 24 hours) at 11/04/18 0902  Last data filed at 11/04/18 7911   Gross per 24 hour   Intake              650 ml   Output             3675 ml   Net            -3025 ml     General: NAD  Skin: no rash  HEENT: normocephalic  Neck: supple  Chest: decreased  Heart: RRR  Abdomen: soft nt nd  Extremities: + edema  Neuro: alert awake  Psych: mood and affect appropriate    Medications:    Current Facility-Administered Medications:     acetaminophen (TYLENOL) rectal suppository 650 mg, 650 mg, Rectal, Q4H PRN, Dominick Bell Spatzer, CRNP, 650 mg at 11/02/18 0507    amLODIPine (NORVASC) tablet 5 mg, 5 mg, Oral, Daily, PAT Garza, 5 mg at 11/04/18 0817    cefepime (MAXIPIME) 1,000 mg in dextrose 5 % 50 mL IVPB, 1,000 mg, Intravenous, Q12H, PAT Chan, Last Rate: 100 mL/hr at 11/04/18 0818, 1,000 mg at 11/04/18 0818    chlorhexidine (PERIDEX) 0 12 % oral rinse 15 mL, 15 mL, Swish & Spit, Q12H [facility], PAT Baker, 15 mL at 11/04/18 0818    heparin (porcine) subcutaneous injection 5,000 Units, 5,000 Units, Subcutaneous, Q8H [facility], 5,000 Units at 11/04/18 0540 **AND** Platelet count, , , Once, PAT Olsen    insulin lispro (HumaLOG) 100 units/mL subcutaneous injection 1-6 Units, 1-6 Units, Subcutaneous, 4x Daily (AC & HS) **AND** Fingerstick Glucose (POCT), , , 4x Daily AC and at bedtime, Damir Wellington MD    [facility]) inhalation solution 1 25 mg, 1 25 mg, Nebulization, TID, PAT Garza, 1 25 mg at 11/04/18 0735    metoprolol (LOPRESSOR) injection 5 mg, 5 mg, Intravenous, Q6H PRN, PAT Cerna    pantoprazole (PROTONIX) injection 40 mg, 40 mg, Intravenous, Q24H [facility], PAT Olsen, 40 mg at 11/04/18 0818    sodium chloride 0 9 % inhalation solution 3 mL, 3 mL, Nebulization, TID, Damir Wellington MD, 3 mL at 11/04/18 0735    Laboratory Results:    Results from last 7 days  Lab Units 11/04/18  0433 11/03/18  0433 11/02/18  0415 11/01/18  2302 11/01/18  1630 11/01/18  1614 11/01/18  1215   WBC Thousand/uL 16 75* 15 35* 21 58*  --   --   --  20 67*   HEMOGLOBIN g/dL 9 0* 9 0* 8 8*  --   --   --  11 4*   HEMATOCRIT % 27 7* 26 5* 26 9*  --  <15*  --  35 6*   PLATELETS Thousands/uL 215 225 303  --   --  346 397*   POTASSIUM mmol/L 4 4 4 3 4 1 4 0  --  4 9 5 2   CHLORIDE mmol/L 106 111* 107 107  --  107 106   CO2 mmol/L 23 24 23 20*  --  17* 18*   CO2, I-STAT mmol/L  --   --   --   --  7*  --   --    BUN mg/dL 34* 40* 43* 40*  --  39* 37*   CREATININE mg/dL 1 30 1 73* 3 06* 2 85*  --  2 36* 1 99*   CALCIUM mg/dL 8 3 7 9* 7 7* 7 6*  --  8 0* 8 8   MAGNESIUM mg/dL 2 0 --   --   --   --  2 4 2 3   PHOSPHORUS mg/dL  --   --   --   --   --  8 1*  --    GLUCOSE, ISTAT mg/dl  --   --   --   --  142*  --   --

## 2018-11-04 NOTE — PROGRESS NOTES
Progress Note - Cardiology   Grace Lyon 70 y o  male MRN: [id number]  Unit/Bed#: ICU 03 Encounter: [id number]      Assessment:     1  Fever/sepsis/resolved profound acidosis  2  Complete heart block status post temporary pacemaker-not pacing currently  3  New onset atrial fibrillation  4  Acute renal failure-resolved  5  Mild aortic stenosis, some degree of mitral stenosis  6  Diabetes   7  Hypertension  8  Dyslipidemia    Discussion/Recommendations:    Temperature of 100 4° this a m  Atrial fibrillation overnight and currently  Will need cintia blockade for atrial fibrillation-will need permanent pacemaker secondary to complete heart block on metoprolol  Will proceed with permanent pacemaker once infection completely ruled out      Subjective:  Feels well-no complaints    Physical Exam:  GEN:  NAD  HEENT:  MMM, NCAT, pink conjunctiva, EOMI, nonicteric sclera  CV:  NO JVD/HJR, RR, NO M/R/G, +S1/S2, NO PARASTERNAL HEAVE/THRILL, NO LE EDEMA, NO HEPATIC SYSTOLIC PULSATION, WARM EXTREMITIES  RESP:  CTAB/L  ABD:  SOFT, NT, NO GROSS ORGANOMEGALY        Vitals:   /60   Pulse 80   Temp 99 3 °F (37 4 °C)   Resp 17   Ht 5' 10" (1 778 m)   Wt 114 kg (250 lb 7 1 oz)   SpO2 98%   BMI 35 93 kg/m²   Vitals:    11/03/18 0536 11/04/18 0547   Weight: 114 kg (252 lb 3 3 oz) 114 kg (250 lb 7 1 oz)       Intake/Output Summary (Last 24 hours) at 11/04/18 0731  Last data filed at 11/04/18 0615   Gross per 24 hour   Intake              700 ml   Output             3375 ml   Net            -2675 ml         TELEMETRY:  Atrial fibrillation  Lab Results:    Results from last 7 days  Lab Units 11/04/18  0433   WBC Thousand/uL 16 75*   HEMOGLOBIN g/dL 9 0*   HEMATOCRIT % 27 7*   PLATELETS Thousands/uL 215       Results from last 7 days  Lab Units 11/04/18  0433  11/01/18  1630 11/01/18  1614   POTASSIUM mmol/L 4 4  < >  --  4 9   CHLORIDE mmol/L 106  < >  --  107   CO2 mmol/L 23  < >  --  17*   CO2, I-STAT mmol/L  --   -- 7*  --    BUN mg/dL 34*  < >  --  39*   CREATININE mg/dL 1 30  < >  --  2 36*   CALCIUM mg/dL 8 3  < >  --  8 0*   ALK PHOS U/L  --   --   --  80   ALT U/L  --   --   --  61   AST U/L  --   --   --  65*   GLUCOSE, ISTAT mg/dl  --   --  142*  --    < > = values in this interval not displayed  Results from last 7 days  Lab Units 11/04/18  0433  11/01/18  1630   POTASSIUM mmol/L 4 4  < >  --    CHLORIDE mmol/L 106  < >  --    CO2 mmol/L 23  < >  --    CO2, I-STAT mmol/L  --   --  7*   BUN mg/dL 34*  < >  --    CREATININE mg/dL 1 30  < >  --    GLUCOSE, ISTAT mg/dl  --   --  142*   CALCIUM mg/dL 8 3  < >  --    < > = values in this interval not displayed            Medications:    Current Facility-Administered Medications:     acetaminophen (TYLENOL) rectal suppository 650 mg, 650 mg, Rectal, Q4H PRN, Virginia Mars Spatzer, CRNP, 650 mg at 11/02/18 0507    amLODIPine (NORVASC) tablet 5 mg, 5 mg, Oral, Daily, PAT Chan, 5 mg at 11/03/18 1146    cefepime (MAXIPIME) 1,000 mg in dextrose 5 % 50 mL IVPB, 1,000 mg, Intravenous, Q12H, PAT Chan, Stopped at 11/03/18 2114    chlorhexidine (PERIDEX) 0 12 % oral rinse 15 mL, 15 mL, Swish & Spit, Q12H [address], Namon Breath PAT Ordonez, 15 mL at 11/03/18 2023    heparin (porcine) subcutaneous injection 5,000 Units, 5,000 Units, Subcutaneous, Q8H [address], 5,000 Units at 11/04/18 0540 **AND** Platelet count, , , Once, PAT Olsen    insulin lispro (HumaLOG) 100 units/mL subcutaneous injection 1-6 Units, 1-6 Units, Subcutaneous, 4x Daily (AC & HS) **AND** Fingerstick Glucose (POCT), , , 4x Daily AC and at bedtime, Tara Mcdonald MD    [facility]) inhalation solution 1 25 mg, 1 25 mg, Nebulization, TID, ODALIS ReynosoNP, 1 25 mg at 11/03/18 1922    metoprolol (LOPRESSOR) injection 5 mg, 5 mg, Intravenous, Q6H PRN, PAT Anthony    pantoprazole (PROTONIX) injection 40 mg, 40 mg, Intravenous, Q24H [address], PAT Olsen, 40 mg at 11/03/18 0813    sodium chloride 0 9 % inhalation solution 3 mL, 3 mL, Nebulization, TID, Shahida Kaufman MD, 3 mL at 11/03/18 1922    Portions of the record may have been created with voice recognition software  Occasional wrong word or "sound a like" substitutions may have occurred due to the inherent limitations of voice recognition software  Read the chart carefully and recognize, using context, where substitutions have occurred

## 2018-11-05 PROBLEM — R80.1 PERSISTENT PROTEINURIA: Status: ACTIVE | Noted: 2018-11-05

## 2018-11-05 PROBLEM — E87.70 VOLUME OVERLOAD: Status: ACTIVE | Noted: 2018-11-05

## 2018-11-05 LAB
ANION GAP SERPL CALCULATED.3IONS-SCNC: 11 MMOL/L (ref 4–13)
ATRIAL RATE: 120 BPM
BUN SERPL-MCNC: 37 MG/DL (ref 5–25)
CALCIUM SERPL-MCNC: 8.4 MG/DL (ref 8.3–10.1)
CHLORIDE SERPL-SCNC: 104 MMOL/L (ref 100–108)
CO2 SERPL-SCNC: 23 MMOL/L (ref 21–32)
CREAT SERPL-MCNC: 1.24 MG/DL (ref 0.6–1.3)
ERYTHROCYTE [DISTWIDTH] IN BLOOD BY AUTOMATED COUNT: 14.1 % (ref 11.6–15.1)
GFR SERPL CREATININE-BSD FRML MDRD: 61 ML/MIN/1.73SQ M
GLUCOSE SERPL-MCNC: 144 MG/DL (ref 65–140)
GLUCOSE SERPL-MCNC: 160 MG/DL (ref 65–140)
GLUCOSE SERPL-MCNC: 188 MG/DL (ref 65–140)
GLUCOSE SERPL-MCNC: 193 MG/DL (ref 65–140)
GLUCOSE SERPL-MCNC: 230 MG/DL (ref 65–140)
HCT VFR BLD AUTO: 27.9 % (ref 36.5–49.3)
HGB BLD-MCNC: 9.1 G/DL (ref 12–17)
MAGNESIUM SERPL-MCNC: 2.1 MG/DL (ref 1.6–2.6)
MCH RBC QN AUTO: 30 PG (ref 26.8–34.3)
MCHC RBC AUTO-ENTMCNC: 32.6 G/DL (ref 31.4–37.4)
MCV RBC AUTO: 92 FL (ref 82–98)
PLATELET # BLD AUTO: 255 THOUSANDS/UL (ref 149–390)
PMV BLD AUTO: 9.1 FL (ref 8.9–12.7)
POTASSIUM SERPL-SCNC: 3.9 MMOL/L (ref 3.5–5.3)
QRS AXIS: 79 DEGREES
QRSD INTERVAL: 79 MS
QT INTERVAL: 296 MS
QTC INTERVAL: 419 MS
RBC # BLD AUTO: 3.03 MILLION/UL (ref 3.88–5.62)
SODIUM SERPL-SCNC: 138 MMOL/L (ref 136–145)
T WAVE AXIS: -62 DEGREES
VENTRICULAR RATE: 120 BPM
WBC # BLD AUTO: 15.67 THOUSAND/UL (ref 4.31–10.16)

## 2018-11-05 PROCEDURE — 82948 REAGENT STRIP/BLOOD GLUCOSE: CPT

## 2018-11-05 PROCEDURE — 94640 AIRWAY INHALATION TREATMENT: CPT

## 2018-11-05 PROCEDURE — 99232 SBSQ HOSP IP/OBS MODERATE 35: CPT | Performed by: INTERNAL MEDICINE

## 2018-11-05 PROCEDURE — 80048 BASIC METABOLIC PNL TOTAL CA: CPT | Performed by: NURSE PRACTITIONER

## 2018-11-05 PROCEDURE — 99233 SBSQ HOSP IP/OBS HIGH 50: CPT | Performed by: INTERNAL MEDICINE

## 2018-11-05 PROCEDURE — 83735 ASSAY OF MAGNESIUM: CPT | Performed by: NURSE PRACTITIONER

## 2018-11-05 PROCEDURE — 93010 ELECTROCARDIOGRAM REPORT: CPT | Performed by: INTERNAL MEDICINE

## 2018-11-05 PROCEDURE — 85027 COMPLETE CBC AUTOMATED: CPT | Performed by: NURSE PRACTITIONER

## 2018-11-05 PROCEDURE — C9113 INJ PANTOPRAZOLE SODIUM, VIA: HCPCS | Performed by: NURSE PRACTITIONER

## 2018-11-05 PROCEDURE — 94660 CPAP INITIATION&MGMT: CPT

## 2018-11-05 RX ORDER — FUROSEMIDE 10 MG/ML
40 INJECTION INTRAMUSCULAR; INTRAVENOUS
Status: DISCONTINUED | OUTPATIENT
Start: 2018-11-05 | End: 2018-11-06

## 2018-11-05 RX ORDER — LOSARTAN POTASSIUM 50 MG/1
50 TABLET ORAL DAILY
Status: DISCONTINUED | OUTPATIENT
Start: 2018-11-06 | End: 2018-11-07

## 2018-11-05 RX ADMIN — FUROSEMIDE 40 MG: 10 INJECTION, SOLUTION INTRAMUSCULAR; INTRAVENOUS at 10:43

## 2018-11-05 RX ADMIN — AMLODIPINE BESYLATE 10 MG: 5 TABLET ORAL at 09:08

## 2018-11-05 RX ADMIN — PANTOPRAZOLE SODIUM 40 MG: 40 INJECTION, POWDER, FOR SOLUTION INTRAVENOUS at 09:08

## 2018-11-05 RX ADMIN — SODIUM CHLORIDE 2 MG/HR: 0.9 INJECTION, SOLUTION INTRAVENOUS at 08:00

## 2018-11-05 RX ADMIN — INSULIN LISPRO 3 UNITS: 100 INJECTION, SOLUTION INTRAVENOUS; SUBCUTANEOUS at 16:42

## 2018-11-05 RX ADMIN — LEVALBUTEROL HYDROCHLORIDE 1.25 MG: 1.25 SOLUTION, CONCENTRATE RESPIRATORY (INHALATION) at 13:31

## 2018-11-05 RX ADMIN — FUROSEMIDE 40 MG: 10 INJECTION, SOLUTION INTRAMUSCULAR; INTRAVENOUS at 16:38

## 2018-11-05 RX ADMIN — INSULIN LISPRO 2 UNITS: 100 INJECTION, SOLUTION INTRAVENOUS; SUBCUTANEOUS at 21:07

## 2018-11-05 RX ADMIN — SODIUM CHLORIDE 5 MG/HR: 0.9 INJECTION, SOLUTION INTRAVENOUS at 02:33

## 2018-11-05 RX ADMIN — LOSARTAN POTASSIUM 50 MG: 50 TABLET ORAL at 09:11

## 2018-11-05 RX ADMIN — HEPARIN SODIUM 5000 UNITS: 5000 INJECTION INTRAVENOUS; SUBCUTANEOUS at 05:01

## 2018-11-05 RX ADMIN — HEPARIN SODIUM 5000 UNITS: 5000 INJECTION INTRAVENOUS; SUBCUTANEOUS at 21:07

## 2018-11-05 RX ADMIN — CEFEPIME HYDROCHLORIDE 2000 MG: 2 INJECTION, POWDER, FOR SOLUTION INTRAVENOUS at 20:25

## 2018-11-05 RX ADMIN — ISODIUM CHLORIDE 3 ML: 0.03 SOLUTION RESPIRATORY (INHALATION) at 19:32

## 2018-11-05 RX ADMIN — LEVALBUTEROL HYDROCHLORIDE 1.25 MG: 1.25 SOLUTION, CONCENTRATE RESPIRATORY (INHALATION) at 19:32

## 2018-11-05 RX ADMIN — ISODIUM CHLORIDE 3 ML: 0.03 SOLUTION RESPIRATORY (INHALATION) at 07:16

## 2018-11-05 RX ADMIN — ISODIUM CHLORIDE 3 ML: 0.03 SOLUTION RESPIRATORY (INHALATION) at 13:31

## 2018-11-05 RX ADMIN — HEPARIN SODIUM 5000 UNITS: 5000 INJECTION INTRAVENOUS; SUBCUTANEOUS at 13:35

## 2018-11-05 RX ADMIN — INSULIN LISPRO 1 UNITS: 100 INJECTION, SOLUTION INTRAVENOUS; SUBCUTANEOUS at 09:08

## 2018-11-05 RX ADMIN — CEFEPIME HYDROCHLORIDE 2000 MG: 2 INJECTION, POWDER, FOR SOLUTION INTRAVENOUS at 09:07

## 2018-11-05 RX ADMIN — INSULIN LISPRO 1 UNITS: 100 INJECTION, SOLUTION INTRAVENOUS; SUBCUTANEOUS at 13:35

## 2018-11-05 RX ADMIN — LEVALBUTEROL HYDROCHLORIDE 1.25 MG: 1.25 SOLUTION, CONCENTRATE RESPIRATORY (INHALATION) at 07:16

## 2018-11-05 NOTE — SOCIAL WORK
CM met with pt and wife at bedside  Pt lives with his wife in a two story home  No difficulty with the steps  No DME  Indp in ADLs  No hx of VNA/STR  Pharm is [facility]   PCP is Dr Aly Salazar  Pt denied any MH hx  No POA/AD  Wife reported they having an appt this month to complete this  Pt drives self  No CM dc concerns/needs at the time, but CM dept following

## 2018-11-05 NOTE — PROGRESS NOTES
NEPHROLOGY PROGRESS NOTE   Grace Lyon 70 y o  male MRN: [id number]  Unit/Bed#: ICU 03 Encounter: [id number]      ASSESSMENT:    40-year-old male presented with bradycardia and complete heart block complicated by hypotension and acute kidney injury    1  Acute kidney injury secondary to ATN in the setting of diuretics and ARB use with stage 3 chronic kidney disease  2  Anion gap metabolic acidosis resolved  3  Diastolic congestive heart failure and bradycardia with complete heart block  4   Proteinuric kidney disease in the setting of longstanding type 2 diabetes mellitus    PLAN:    -creatinine back to baseline with stage 3 chronic kidney disease  -patient known to me as an outpatient was on metoprolol 100 mg 2 times daily, losartan 100 mg daily, furosemide 40 mg daily as needed, and chlorthalidone 25 mg daily along with amlodipine 10 mg daily  -currently on a nicardipine drip and blood pressures are 077-122 systolic  -Amlodipine re-initiated, losartan re-initiated at a lower dose of 50 mg daily  -patient's weight is increased with decreased breath sounds at the bases and peripheral edema-discussed with Cardiology and ICU team will start 40 mg of IV Lasix b i d   -if blood pressures remain elevated after weaning off nicardipine increase losartan to 50 mg twice daily  -serologic workup was done for his proteinuria as an outpatient and was negative, creatinine was stable with a history of diabetes will monitor and treat conservatively     SUBJECTIVE:    Patient seen denies any new chest pain or shortness of Breath extubated comfortable blood pressures remain elevated but nicardipine drip is being decreased    OBJECTIVE:  Current Weight: Weight - Scale: 114 kg (250 lb 7 1 oz)  Vitals:    11/05/18 0716   BP:    Pulse:    Resp:    Temp:    SpO2: 98%       Intake/Output Summary (Last 24 hours) at 11/05/18 0943  Last data filed at 11/05/18 0545   Gross per 24 hour   Intake           969 16 ml   Output              602 ml   Net           367 16 ml       General: conscious, cooperative, in not acute distress  Eyes: conjunctivae pink, anicteric sclerae  ENT: lips and mucous membranes moist  Neck: supple, no JVD  Chest:  Decreased breath sounds  CVS: distinct S1 & S2, normal rate, regular rhythm  Abdomen: soft, non-tender, non-distended, normoactive bowel sounds  Extremities:  Anasarca  Skin: no rash  Neuro: awake, alert, oriented        Medications:    Current Facility-Administered Medications:     acetaminophen (TYLENOL) rectal suppository 650 mg, 650 mg, Rectal, Q4H PRN, Dory Riser Spatzer, CRNP, 650 mg at 11/02/18 0507    amLODIPine (NORVASC) tablet 10 mg, 10 mg, Oral, Daily, PAT Chan, 10 mg at 11/05/18 0908    cefepime (MAXIPIME) 2,000 mg in dextrose 5 % 50 mL IVPB, 2,000 mg, Intravenous, Q12H, PAT Chan, Last Rate: 100 mL/hr at 11/05/18 0907, 2,000 mg at 11/05/18 0907    heparin (porcine) subcutaneous injection 5,000 Units, 5,000 Units, Subcutaneous, Q8H [address], 5,000 Units at 11/05/18 0501 **AND** Platelet count, , , Once, PAT Olsen    insulin lispro (HumaLOG) 100 units/mL subcutaneous injection 1-6 Units, 1-6 Units, Subcutaneous, 4x Daily (AC & HS), 1 Units at 11/05/18 0908 **AND** Fingerstick Glucose (POCT), , , 4x Daily AC and at bedtime, Marcelino Poon MD    levalbuterol [facility]) inhalation solution 1 25 mg, 1 25 mg, Nebulization, TID, PAT Landon, 1 25 mg at 11/05/18 0716    [START ON 11/6/2018] losartan (COZAAR) tablet 50 mg, 50 mg, Oral, Daily, Elvin Estevez DO    niCARdipine (CARDENE) 25 mg (STANDARD CONCENTRATION) in sodium chloride 0 9% 250 mL, 1-15 mg/hr, Intravenous, Titrated, PAT Landon, Last Rate: 50 mL/hr at 11/05/18 0233, 5 mg/hr at 11/05/18 0233    pantoprazole (PROTONIX) injection 40 mg, 40 mg, Intravenous, Q24H [address], PAT Olsen, 40 mg at 11/05/18 0908    sodium chloride 0 9 % inhalation solution 3 mL, 3 mL, Nebulization, TID, Nora Chung Kayleigh Eid MD, 3 mL at 11/05/18 0716    Invasive Devices:      Lab Results:     Results from last 7 days  Lab Units 11/05/18  0457 11/04/18  0433 11/03/18  0433 11/02/18  0415 11/02/18  0204 11/01/18  2309 11/01/18  2305 11/01/18  2302 11/01/18  1630 11/01/18  1614 11/01/18  1215   WBC Thousand/uL 15 67* 16 75* 15 35* 21 58*  --   --   --   --   --   --  20 67*   HEMOGLOBIN g/dL 9 1* 9 0* 9 0* 8 8*  --   --   --   --   --   --  11 4*   HEMATOCRIT % 27 9* 27 7* 26 5* 26 9*  --   --   --   --  <15*  --  35 6*   PLATELETS Thousands/uL 255 215 225 303  --   --   --   --   --  346 397*   POTASSIUM mmol/L 3 9 4 4 4 3 4 1  --   --   --  4 0  --  4 9 5 2   CHLORIDE mmol/L 104 106 111* 107  --   --   --  107  --  107 106   CO2 mmol/L 23 23 24 23  --   --   --  20*  --  17* 18*   CO2, I-STAT mmol/L  --   --   --   --   --   --   --   --  7*  --   --    BUN mg/dL 37* 34* 40* 43*  --   --   --  40*  --  39* 37*   CREATININE mg/dL 1 24 1 30 1 73* 3 06*  --   --   --  2 85*  --  2 36* 1 99*   CALCIUM mg/dL 8 4 8 3 7 9* 7 7*  --   --   --  7 6*  --  8 0* 8 8   MAGNESIUM mg/dL 2 1 2 0  --   --   --   --   --   --   --  2 4 2 3   PHOSPHORUS mg/dL  --   --   --   --   --   --   --   --   --  8 1*  --    ALK PHOS U/L  --   --   --   --   --   --   --   --   --  80 85   ALT U/L  --   --   --   --   --   --   --   --   --  61 33   AST U/L  --   --   --   --   --   --   --   --   --  65* 28   GLUCOSE, ISTAT mg/dl  --   --   --   --   --   --   --   --  142*  --   --    BLOOD CULTURE   --   --   --   --   --  No Growth at 72 hrs   No Growth at 72 hrs   --   --   --   --    LEUKOCYTES UA   --   --   --   --  Negative  --   --   --   --   --   --    BLOOD UA   --   --   --   --  Moderate*  --   --   --   --   --   --        Previous work up:  Please see previous notes

## 2018-11-05 NOTE — UTILIZATION REVIEW
Continued Stay Review    Date:     11/5/2018    Vital Signs: BP (!) 190/63   Pulse 104   Temp 100 1 °F (37 8 °C) (Temporal)   Resp (!) 29   Ht 5' 10" (1 778 m)   Wt 114 kg (250 lb 7 1 oz)   SpO2 93%   BMI 35 93 kg/m²     Medications:   Scheduled Meds:   Current Facility-Administered Medications:  acetaminophen 650 mg Rectal Q4H PRN Delrae Clas Spatzer, CRNP    amLODIPine 10 mg Oral Daily Charles SondayPAT    cefepime 2,000 mg Intravenous Q12H Charles SondayPAT Last Rate: 2,000 mg (11/05/18 1761)   furosemide 40 mg Intravenous BID (diuretic) Alexandra Bustamante DO    heparin (porcine) 5,000 Units Subcutaneous Q8H [facility] PAT Olsen    insulin lispro 1-6 Units Subcutaneous 4x Daily (AC & HS) Rohti Mejia MD    levalbuterol 1 25 mg Nebulization TID PAT Nagel    [START ON 11/6/2018] losartan 50 mg Oral Daily Alexandra Bustamante DO    niCARdipine 1-15 mg/hr Intravenous Titrated PAT Nagel Last Rate: 2 mg/hr (11/05/18 0800)   pantoprazole 40 mg Intravenous Q24H [facility] PAT Olsen    sodium chloride 3 mL Nebulization TID Rohit Mejia MD      Continuous Infusions:   niCARdipine 1-15 mg/hr Last Rate: 2 mg/hr (11/05/18 0800)     PRN Meds:   acetaminophen    Abnormal Labs/Diagnostic Results:    WBC   15 67  H/H  9 1//27 9  BUN  37    Age/Sex: 70 y o  male     1  Assessment/Plan: Complete heart block; s/p temporary IV pacer placement  · Current rhythm fluctuates between paced and sinus  · Plan for permanent pacer with cleared by cardiology     2  Atrial fibrillation with RVR, now resolved  · Patient very sensitive to beta blockers, following dose of lopressor there was no intrinsic rhythm noted and 100% paced     3  Hypertension  · Cardene drip initiated yesterday  · Cozaar restarted yesterday, 50% of his usual dose given last night  · Will continue norvasc at higher dosage today  · Attempt to wean cardene drip     4   Acute hypoxic respiratory failure likely secondary to pulmonary edema  · Received lasix yesterday, Bipap prn  · Maintain oxygen saturations >88%     5  Fever and leukocytosis, does not appear infectious  ? will complete empiric cefepime for 7 days, currently day #4     6  Diabetes, type 2  ?  Continue accuchecks with SSI coverage     NICOLASA, will require sleep study as an outpatient    Discharge Plan:    home

## 2018-11-05 NOTE — PROGRESS NOTES
Progress Note - Critical Care   Emani Speaker 70 y o  male MRN: [id number]  Unit/Bed#: ICU 03 Encounter: [id number]    Assessment/Plan:  1  Complete heart block; s/p temporary IV pacer placement  · Current rhythm fluctuates between paced and sinus  · Plan for permanent pacer with cleared by cardiology    2  Atrial fibrillation with RVR, now resolved  · Patient very sensitive to beta blockers, following dose of lopressor there was no intrinsic rhythm noted and 100% paced    3  Hypertension  · Cardene drip initiated yesterday  · Cozaar restarted yesterday, 50% of his usual dose given last night  · Will continue norvasc at higher dosage today  · Attempt to wean cardene drip    4  Acute hypoxic respiratory failure likely secondary to pulmonary edema  · Received lasix yesterday, Bipap prn  · Maintain oxygen saturations >88%    5  Fever and leukocytosis, does not appear infectious  · will complete empiric cefepime for 7 days, currently day #4    6  Diabetes, type 2  · Continue accuchecks with SSI coverage    7  NICOLASA, will require sleep study as an outpatient      _____________________________________________________________________    HPI/24hr events:   No events overnight      Medications:    Current Facility-Administered Medications:  acetaminophen 650 mg Rectal Q4H PRN Tristan Splinter Spatzer, CRNP    amLODIPine 10 mg Oral Daily Charles SondayPAT    cefepime 2,000 mg Intravenous Q12H Charles SondayPAT Last Rate: 2,000 mg (11/04/18 2102)   heparin (porcine) 5,000 Units Subcutaneous Q8H [address] [facility] PAT Ordonez    insulin lispro 1-6 Units Subcutaneous 4x Daily (AC & HS) Ruddy Vega MD    levalbuterol 1 25 mg Nebulization TID PAT Jackson    losartan 50 mg Oral Daily Charles SondayPAT    niCARdipine 1-15 mg/hr Intravenous Titrated PAT Jackson Last Rate: 5 mg/hr (11/04/18 2112)   pantoprazole 40 mg Intravenous Q24H [address] PAT Olsen    sodium chloride 3 mL Nebulization TID Ruddy Vega MD niCARdipine 1-15 mg/hr Last Rate: 5 mg/hr (11/04/18 2112)         Physical exam:  Vitals: Body mass index is 35 93 kg/m²  Blood pressure (!) 190/63, pulse 86, temperature 99 °F (37 2 °C), temperature source Temporal, resp  rate (!) 24, height 5' 10" (1 778 m), weight 114 kg (250 lb 7 1 oz), SpO2 97 %  ,  Temp  Min: 94 3 °F (34 6 °C)  Max: 101 8 °F (38 8 °C)  IBW: 73 kg    SpO2: 97 %  SpO2 Activity: At Rest  O2 Device: Nasal cannula      Intake/Output Summary (Last 24 hours) at 11/05/18 0101  Last data filed at 11/05/18 0000   Gross per 24 hour   Intake           765 83 ml   Output             2282 ml   Net         -1516 17 ml       Invasive/non-invasive ventilation settings:   Respiratory    Lab Data (Last 4 hours)    None         O2/Vent Data (Last 4 hours)      11/04 2338          Non-Invasive Ventilation Mode BiPAP                 Invasive Devices     Peripheral Intravenous Line            Peripheral IV 11/01/18 Right Antecubital 4 days    Peripheral IV 11/03/18 Left Arm 1 day          Line            Arterial Sheath Other (Comment) Right Femoral 3 days    Pacer Wires 3 days    Venous Sheath 6 Fr  Right Femoral 3 days                  Physical Exam:  Gen: obese, cooperative, in NAD  HEENT: normocephalic, atraumatic, oropharynx clear  Neck: thick, trachea midline  Chest: lung sounds clear, equal  Cor: systolic murmur, trace edema  Abd: soft, non tender, non distended  Ext: moves all extremities, equally  Neuro: alert and oriented x3  Skin: warm, dry      Diagnostic Data:  Lab: I have personally reviewed pertinent lab results     CBC:     Results from last 7 days  Lab Units 11/04/18  0433 11/03/18  0433 11/02/18  0415   WBC Thousand/uL 16 75* 15 35* 21 58*   HEMOGLOBIN g/dL 9 0* 9 0* 8 8*   HEMATOCRIT % 27 7* 26 5* 26 9*   PLATELETS Thousands/uL 215 225 303       CMP:     Results from last 7 days  Lab Units 11/04/18  0433 11/03/18  0433 11/02/18  0415  11/01/18  1630 11/01/18  1614 11/01/18  1215 POTASSIUM mmol/L 4 4 4 3 4 1  < >  --  4 9 5 2   CHLORIDE mmol/L 106 111* 107  < >  --  107 106   CO2 mmol/L 23 24 23  < >  --  17* 18*   CO2, I-STAT mmol/L  --   --   --   --  7*  --   --    BUN mg/dL 34* 40* 43*  < >  --  39* 37*   CREATININE mg/dL 1 30 1 73* 3 06*  < >  --  2 36* 1 99*   CALCIUM mg/dL 8 3 7 9* 7 7*  < >  --  8 0* 8 8   ALK PHOS U/L  --   --   --   --   --  80 85   ALT U/L  --   --   --   --   --  61 33   AST U/L  --   --   --   --   --  65* 28   GLUCOSE, ISTAT mg/dl  --   --   --   --  142*  --   --    < > = values in this interval not displayed  PT/INR:   No results found for: PT, INR,   Magnesium:   Results from last 7 days  Lab Units 11/04/18  0433 11/01/18  1614 11/01/18  1215   MAGNESIUM mg/dL 2 0 2 4 2 3     Phosphorous:   Results from last 7 days  Lab Units 11/01/18  1614   PHOSPHORUS mg/dL 8 1*       Microbiology:    Results from last 7 days  Lab Units 11/02/18  0836 11/01/18  2309 11/01/18  2305   BLOOD CULTURE   --  No Growth at 48 hrs  No Growth at 48 hrs  INFLUENZA B PCR  None Detected  --   --    RSV PCR  None Detected  --   --        Imaging:  No new imaging    Cardiac lab/EKG/telemetry/ECHO:   Paced, Sinus    VTE Prophylaxis: SCD/heparin    Code Status: Level 1 - Full Code    Sabi Grandchild, CRNP    Portions of the record may have been created with voice recognition software  Occasional wrong word or "sound a like" substitutions may have occurred due to the inherent limitations of voice recognition software  Read the chart carefully and recognize, using context, where substitutions have occurred

## 2018-11-05 NOTE — PLAN OF CARE
Problem: DISCHARGE PLANNING - CARE MANAGEMENT  Goal: Discharge to post-acute care or home with appropriate resources  INTERVENTIONS:  - Conduct assessment to determine patient/family and health care team treatment goals, and need for post-acute services based on payer coverage, community resources, and patient preferences, and barriers to discharge  - Address psychosocial, clinical, and financial barriers to discharge as identified in assessment in conjunction with the patient/family and health care team  - Arrange appropriate level of post-acute services according to patients   needs and preference and payer coverage in collaboration with the physician and health care team  - Communicate with and update the patient/family, physician, and health care team regarding progress on the discharge plan  - Arrange appropriate transportation to post-acute venues  Outcome: Adequate for Discharge  No CM dc concerns/needs at the time, but CM dept following

## 2018-11-05 NOTE — PROGRESS NOTES
Progress Note - Cardiology   Katerin Horne 70 y o  male MRN: [id number]  Unit/Bed#: ICU 03 Encounter: [id number]      Assessment/Recommendations/Discussion:   1  Complete heart block status post temporary pacemaker  2  Fever, sepsis, questionable middle ear infection  3  New onset atrial fibrillation, currently sinus rhythm  4  Acute kidney injury on chronic kidney disease, resolved  5  Mild aortic valve stenosis  6  Diabetes  7  Hypertension  8  Dyslipidemia    · Very low-grade fever now, with leukocytosis improving  Per Critical Care, no ongoing signs of infection, and cleared for PPM   Currently on cefepime for 7 days  Blood cultures negative and procalcitonin negative  Will discuss with EP optimal time of pacer implantation from their end--hopefully in 1-2 days  · Titrate cardene down to off as able  Continue amlodipine and losartan  D/W nephrology who is considering chlorthalidone--agree with the same  Consider furosemide at some point as pt does appear mildly edemetous  · Will start DOAC post PPM implantation for CVA prevention---hopefully dual chamber PPM   May consider DC in future if no recurrent AF      Subjective: Pt seen/examined    Feels well, no CP, SOB      Physical Exam:  GEN:  NAD  HEENT:  MMM, NCAT, pink conjunctiva, EOMI, nonicteric sclera  CV:  NO JVD/HJR, RR, NO M/R/G, +S1/S2, NO PARASTERNAL HEAVE/THRILL, +LE EDEMA, NO HEPATIC SYSTOLIC PULSATION, WARM EXTREMITIES  RESP:  CTAB/L  ABD:  SOFT, NT, NO GROSS ORGANOMEGALY        Vitals:   BP (!) 190/63   Pulse 92   Temp 99 8 °F (37 7 °C) (Temporal)   Resp 18   Ht 5' 10" (1 778 m)   Wt 114 kg (250 lb 7 1 oz)   SpO2 98%   BMI 35 93 kg/m²   Vitals:    11/03/18 0536 11/04/18 0547   Weight: 114 kg (252 lb 3 3 oz) 114 kg (250 lb 7 1 oz)       Intake/Output Summary (Last 24 hours) at 11/05/18 0933  Last data filed at 11/05/18 0552   Gross per 24 hour   Intake           969 16 ml   Output              737 ml   Net           232 16 ml TELEMETRY: AF noted yesterday, sinus currently  Lab Results:    Results from last 7 days  Lab Units 11/05/18 0457   WBC Thousand/uL 15 67*   HEMOGLOBIN g/dL 9 1*   HEMATOCRIT % 27 9*   PLATELETS Thousands/uL 255       Results from last 7 days  Lab Units 11/05/18 0457 11/01/18  1630 11/01/18  1614   POTASSIUM mmol/L 3 9  < >  --  4 9   CHLORIDE mmol/L 104  < >  --  107   CO2 mmol/L 23  < >  --  17*   CO2, I-STAT mmol/L  --   --  7*  --    BUN mg/dL 37*  < >  --  39*   CREATININE mg/dL 1 24  < >  --  2 36*   CALCIUM mg/dL 8 4  < >  --  8 0*   ALK PHOS U/L  --   --   --  80   ALT U/L  --   --   --  61   AST U/L  --   --   --  65*   GLUCOSE, ISTAT mg/dl  --   --  142*  --    < > = values in this interval not displayed  Results from last 7 days  Lab Units 11/05/18 0457 11/01/18  1630   POTASSIUM mmol/L 3 9  < >  --    CHLORIDE mmol/L 104  < >  --    CO2 mmol/L 23  < >  --    CO2, I-STAT mmol/L  --   --  7*   BUN mg/dL 37*  < >  --    CREATININE mg/dL 1 24  < >  --    GLUCOSE, ISTAT mg/dl  --   --  142*   CALCIUM mg/dL 8 4  < >  --    < > = values in this interval not displayed          Medications:    Current Facility-Administered Medications:     acetaminophen (TYLENOL) rectal suppository 650 mg, 650 mg, Rectal, Q4H PRN, Ruthe Pouch Spatzer, CRNP, 650 mg at 11/02/18 0507    amLODIPine (NORVASC) tablet 10 mg, 10 mg, Oral, Daily, PAT Chan, 10 mg at 11/05/18 0908    cefepime (MAXIPIME) 2,000 mg in dextrose 5 % 50 mL IVPB, 2,000 mg, Intravenous, Q12H, PAT Chan, Last Rate: 100 mL/hr at 11/05/18 0907, 2,000 mg at 11/05/18 0907    heparin (porcine) subcutaneous injection 5,000 Units, 5,000 Units, Subcutaneous, Q8H [facility], 5,000 Units at 11/05/18 0501 **AND** Platelet count, , , Once, PAT Olsen    insulin lispro (HumaLOG) 100 units/mL subcutaneous injection 1-6 Units, 1-6 Units, Subcutaneous, 4x Daily (AC & HS), 1 Units at 11/05/18 0908 **AND** Fingerstick Glucose (POCT), , , 4x Daily AC and at bedtime, Rohit Mejia MD    levalbuterol [place]) inhalation solution 1 25 mg, 1 25 mg, Nebulization, TID, PAT Nagel, 1 25 mg at 11/05/18 0716    [START ON 11/6/2018] losartan (COZAAR) tablet 50 mg, 50 mg, Oral, Daily, Alexandra Bustamante DO    niCARdipine (CARDENE) 25 mg (STANDARD CONCENTRATION) in sodium chloride 0 9% 250 mL, 1-15 mg/hr, Intravenous, Titrated, PAT Nagel, Last Rate: 50 mL/hr at 11/05/18 0233, 5 mg/hr at 11/05/18 0233    pantoprazole (PROTONIX) injection 40 mg, 40 mg, Intravenous, Q24H [facility], PAT Olsen, 40 mg at 11/05/18 0908    sodium chloride 0 9 % inhalation solution 3 mL, 3 mL, Nebulization, TID, Rohit Mejia MD, 3 mL at 11/05/18 6376    This note was completed in part utilizing M-[employer] Direct Software  Grammatical errors, random word insertions, spelling mistakes, and incomplete sentences may be an occasional consequence of this system secondary to software limitations, ambient noise, and hardware issues  If you have any questions or concerns about the content, text, or information contained within the body of this dictation, please contact the provider for clarification

## 2018-11-06 ENCOUNTER — APPOINTMENT (OUTPATIENT)
Dept: NON INVASIVE DIAGNOSTICS | Facility: HOSPITAL | Age: 63
DRG: 242 | End: 2018-11-06
Attending: INTERNAL MEDICINE
Payer: MEDICARE

## 2018-11-06 ENCOUNTER — APPOINTMENT (INPATIENT)
Dept: RADIOLOGY | Facility: HOSPITAL | Age: 63
DRG: 242 | End: 2018-11-06
Payer: MEDICARE

## 2018-11-06 ENCOUNTER — ANESTHESIA (INPATIENT)
Dept: NON INVASIVE DIAGNOSTICS | Facility: HOSPITAL | Age: 63
DRG: 242 | End: 2018-11-06
Payer: MEDICARE

## 2018-11-06 PROBLEM — R33.9 URINARY RETENTION: Status: ACTIVE | Noted: 2018-11-06

## 2018-11-06 LAB
BASOPHILS # BLD AUTO: 0.07 THOUSANDS/ΜL (ref 0–0.1)
BASOPHILS NFR BLD AUTO: 1 % (ref 0–1)
EOSINOPHIL # BLD AUTO: 0.38 THOUSAND/ΜL (ref 0–0.61)
EOSINOPHIL NFR BLD AUTO: 3 % (ref 0–6)
ERYTHROCYTE [DISTWIDTH] IN BLOOD BY AUTOMATED COUNT: 14.2 % (ref 11.6–15.1)
GLUCOSE SERPL-MCNC: 193 MG/DL (ref 65–140)
GLUCOSE SERPL-MCNC: 201 MG/DL (ref 65–140)
GLUCOSE SERPL-MCNC: 210 MG/DL (ref 65–140)
GLUCOSE SERPL-MCNC: 238 MG/DL (ref 65–140)
HCT VFR BLD AUTO: 27.7 % (ref 36.5–49.3)
HGB BLD-MCNC: 9.1 G/DL (ref 12–17)
IMM GRANULOCYTES # BLD AUTO: 0.12 THOUSAND/UL (ref 0–0.2)
IMM GRANULOCYTES NFR BLD AUTO: 1 % (ref 0–2)
LYMPHOCYTES # BLD AUTO: 1.26 THOUSANDS/ΜL (ref 0.6–4.47)
LYMPHOCYTES NFR BLD AUTO: 11 % (ref 14–44)
MCH RBC QN AUTO: 30.2 PG (ref 26.8–34.3)
MCHC RBC AUTO-ENTMCNC: 32.9 G/DL (ref 31.4–37.4)
MCV RBC AUTO: 92 FL (ref 82–98)
MONOCYTES # BLD AUTO: 1.14 THOUSAND/ΜL (ref 0.17–1.22)
MONOCYTES NFR BLD AUTO: 10 % (ref 4–12)
NEUTROPHILS # BLD AUTO: 8.35 THOUSANDS/ΜL (ref 1.85–7.62)
NEUTS SEG NFR BLD AUTO: 74 % (ref 43–75)
NRBC BLD AUTO-RTO: 0 /100 WBCS
PLATELET # BLD AUTO: 286 THOUSANDS/UL (ref 149–390)
PMV BLD AUTO: 9.2 FL (ref 8.9–12.7)
PROCALCITONIN SERPL-MCNC: 0.91 NG/ML
RBC # BLD AUTO: 3.01 MILLION/UL (ref 3.88–5.62)
WBC # BLD AUTO: 11.32 THOUSAND/UL (ref 4.31–10.16)

## 2018-11-06 PROCEDURE — C1898 LEAD, PMKR, OTHER THAN TRANS: HCPCS

## 2018-11-06 PROCEDURE — C1785 PMKR, DUAL, RATE-RESP: HCPCS

## 2018-11-06 PROCEDURE — 85025 COMPLETE CBC W/AUTO DIFF WBC: CPT | Performed by: INTERNAL MEDICINE

## 2018-11-06 PROCEDURE — 82948 REAGENT STRIP/BLOOD GLUCOSE: CPT

## 2018-11-06 PROCEDURE — 0T9B70Z DRAINAGE OF BLADDER WITH DRAINAGE DEVICE, VIA NATURAL OR ARTIFICIAL OPENING: ICD-10-PCS | Performed by: INTERNAL MEDICINE

## 2018-11-06 PROCEDURE — 99233 SBSQ HOSP IP/OBS HIGH 50: CPT | Performed by: INTERNAL MEDICINE

## 2018-11-06 PROCEDURE — C9113 INJ PANTOPRAZOLE SODIUM, VIA: HCPCS | Performed by: NURSE PRACTITIONER

## 2018-11-06 PROCEDURE — 94760 N-INVAS EAR/PLS OXIMETRY 1: CPT

## 2018-11-06 PROCEDURE — 33208 INSRT HEART PM ATRIAL & VENT: CPT | Performed by: INTERNAL MEDICINE

## 2018-11-06 PROCEDURE — 02H63JZ INSERTION OF PACEMAKER LEAD INTO RIGHT ATRIUM, PERCUTANEOUS APPROACH: ICD-10-PCS | Performed by: INTERNAL MEDICINE

## 2018-11-06 PROCEDURE — 84145 PROCALCITONIN (PCT): CPT | Performed by: NURSE PRACTITIONER

## 2018-11-06 PROCEDURE — 94640 AIRWAY INHALATION TREATMENT: CPT

## 2018-11-06 PROCEDURE — 02HK3JZ INSERTION OF PACEMAKER LEAD INTO RIGHT VENTRICLE, PERCUTANEOUS APPROACH: ICD-10-PCS | Performed by: INTERNAL MEDICINE

## 2018-11-06 PROCEDURE — C1892 INTRO/SHEATH,FIXED,PEEL-AWAY: HCPCS | Performed by: INTERNAL MEDICINE

## 2018-11-06 PROCEDURE — 71045 X-RAY EXAM CHEST 1 VIEW: CPT

## 2018-11-06 PROCEDURE — 0JH606Z INSERTION OF PACEMAKER, DUAL CHAMBER INTO CHEST SUBCUTANEOUS TISSUE AND FASCIA, OPEN APPROACH: ICD-10-PCS | Performed by: INTERNAL MEDICINE

## 2018-11-06 PROCEDURE — 99232 SBSQ HOSP IP/OBS MODERATE 35: CPT | Performed by: INTERNAL MEDICINE

## 2018-11-06 PROCEDURE — 94660 CPAP INITIATION&MGMT: CPT

## 2018-11-06 RX ORDER — PRAVASTATIN SODIUM 80 MG/1
80 TABLET ORAL
Status: DISCONTINUED | OUTPATIENT
Start: 2018-11-06 | End: 2018-11-16

## 2018-11-06 RX ORDER — FENTANYL CITRATE 50 UG/ML
INJECTION, SOLUTION INTRAMUSCULAR; INTRAVENOUS AS NEEDED
Status: DISCONTINUED | OUTPATIENT
Start: 2018-11-06 | End: 2018-11-06 | Stop reason: SURG

## 2018-11-06 RX ORDER — ALLOPURINOL 100 MG/1
300 TABLET ORAL DAILY
Status: DISCONTINUED | OUTPATIENT
Start: 2018-11-06 | End: 2018-11-16 | Stop reason: HOSPADM

## 2018-11-06 RX ORDER — DILTIAZEM HYDROCHLORIDE 5 MG/ML
INJECTION INTRAVENOUS
Status: COMPLETED
Start: 2018-11-06 | End: 2018-11-06

## 2018-11-06 RX ORDER — LABETALOL HYDROCHLORIDE 5 MG/ML
INJECTION, SOLUTION INTRAVENOUS AS NEEDED
Status: DISCONTINUED | OUTPATIENT
Start: 2018-11-06 | End: 2018-11-06 | Stop reason: SURG

## 2018-11-06 RX ORDER — GENTAMICIN SULFATE 40 MG/ML
INJECTION, SOLUTION INTRAMUSCULAR; INTRAVENOUS CODE/TRAUMA/SEDATION MEDICATION
Status: COMPLETED | OUTPATIENT
Start: 2018-11-06 | End: 2018-11-06

## 2018-11-06 RX ORDER — MIDAZOLAM HYDROCHLORIDE 1 MG/ML
INJECTION INTRAMUSCULAR; INTRAVENOUS AS NEEDED
Status: DISCONTINUED | OUTPATIENT
Start: 2018-11-06 | End: 2018-11-06 | Stop reason: SURG

## 2018-11-06 RX ORDER — ASPIRIN 81 MG/1
81 TABLET ORAL DAILY
Status: DISCONTINUED | OUTPATIENT
Start: 2018-11-06 | End: 2018-11-09

## 2018-11-06 RX ORDER — DILTIAZEM HYDROCHLORIDE 5 MG/ML
10 INJECTION INTRAVENOUS ONCE
Status: COMPLETED | OUTPATIENT
Start: 2018-11-06 | End: 2018-11-06

## 2018-11-06 RX ORDER — LIDOCAINE HYDROCHLORIDE 10 MG/ML
INJECTION, SOLUTION INFILTRATION; PERINEURAL CODE/TRAUMA/SEDATION MEDICATION
Status: COMPLETED | OUTPATIENT
Start: 2018-11-06 | End: 2018-11-06

## 2018-11-06 RX ORDER — MELATONIN
1000 DAILY
Status: DISCONTINUED | OUTPATIENT
Start: 2018-11-06 | End: 2018-11-16 | Stop reason: HOSPADM

## 2018-11-06 RX ORDER — FENTANYL CITRATE/PF 50 MCG/ML
12.5 SYRINGE (ML) INJECTION
Status: DISCONTINUED | OUTPATIENT
Start: 2018-11-06 | End: 2018-11-06 | Stop reason: HOSPADM

## 2018-11-06 RX ORDER — FUROSEMIDE 10 MG/ML
40 INJECTION INTRAMUSCULAR; INTRAVENOUS
Status: COMPLETED | OUTPATIENT
Start: 2018-11-06 | End: 2018-11-10

## 2018-11-06 RX ORDER — TAMSULOSIN HYDROCHLORIDE 0.4 MG/1
0.4 CAPSULE ORAL
Status: DISCONTINUED | OUTPATIENT
Start: 2018-11-06 | End: 2018-11-16 | Stop reason: HOSPADM

## 2018-11-06 RX ORDER — SODIUM CHLORIDE 9 MG/ML
INJECTION, SOLUTION INTRAVENOUS CONTINUOUS PRN
Status: DISCONTINUED | OUTPATIENT
Start: 2018-11-06 | End: 2018-11-06 | Stop reason: SURG

## 2018-11-06 RX ORDER — DILTIAZEM HYDROCHLORIDE 5 MG/ML
10 INJECTION INTRAVENOUS EVERY 6 HOURS PRN
Status: DISCONTINUED | OUTPATIENT
Start: 2018-11-06 | End: 2018-11-16 | Stop reason: HOSPADM

## 2018-11-06 RX ADMIN — HEPARIN SODIUM 5000 UNITS: 5000 INJECTION INTRAVENOUS; SUBCUTANEOUS at 05:12

## 2018-11-06 RX ADMIN — FENTANYL CITRATE 25 MCG: 50 INJECTION, SOLUTION INTRAMUSCULAR; INTRAVENOUS at 07:07

## 2018-11-06 RX ADMIN — PANTOPRAZOLE SODIUM 40 MG: 40 INJECTION, POWDER, FOR SOLUTION INTRAVENOUS at 10:20

## 2018-11-06 RX ADMIN — CEFEPIME HYDROCHLORIDE 2000 MG: 2 INJECTION, POWDER, FOR SOLUTION INTRAVENOUS at 20:39

## 2018-11-06 RX ADMIN — ASPIRIN 81 MG: 81 TABLET, COATED ORAL at 10:30

## 2018-11-06 RX ADMIN — PRAVASTATIN SODIUM 80 MG: 80 TABLET ORAL at 17:48

## 2018-11-06 RX ADMIN — LABETALOL HYDROCHLORIDE 10 MG: 5 INJECTION, SOLUTION INTRAVENOUS at 07:43

## 2018-11-06 RX ADMIN — LABETALOL HYDROCHLORIDE 5 MG: 5 INJECTION, SOLUTION INTRAVENOUS at 08:01

## 2018-11-06 RX ADMIN — ISODIUM CHLORIDE 3 ML: 0.03 SOLUTION RESPIRATORY (INHALATION) at 10:11

## 2018-11-06 RX ADMIN — DILTIAZEM HYDROCHLORIDE 10 MG: 5 INJECTION INTRAVENOUS at 10:30

## 2018-11-06 RX ADMIN — LEVALBUTEROL HYDROCHLORIDE 1.25 MG: 1.25 SOLUTION, CONCENTRATE RESPIRATORY (INHALATION) at 10:11

## 2018-11-06 RX ADMIN — FENTANYL CITRATE 25 MCG: 50 INJECTION, SOLUTION INTRAMUSCULAR; INTRAVENOUS at 07:18

## 2018-11-06 RX ADMIN — MIDAZOLAM 2 MG: 1 INJECTION INTRAMUSCULAR; INTRAVENOUS at 06:51

## 2018-11-06 RX ADMIN — ALLOPURINOL 300 MG: 100 TABLET ORAL at 10:30

## 2018-11-06 RX ADMIN — AMLODIPINE BESYLATE 10 MG: 5 TABLET ORAL at 10:20

## 2018-11-06 RX ADMIN — INSULIN LISPRO 3 UNITS: 100 INJECTION, SOLUTION INTRAVENOUS; SUBCUTANEOUS at 21:18

## 2018-11-06 RX ADMIN — FENTANYL CITRATE 25 MCG: 50 INJECTION, SOLUTION INTRAMUSCULAR; INTRAVENOUS at 06:54

## 2018-11-06 RX ADMIN — FUROSEMIDE 40 MG: 10 INJECTION, SOLUTION INTRAMUSCULAR; INTRAVENOUS at 17:47

## 2018-11-06 RX ADMIN — DILTIAZEM HYDROCHLORIDE 10 MG: 5 INJECTION INTRAVENOUS at 18:45

## 2018-11-06 RX ADMIN — MIDAZOLAM 0.5 MG: 1 INJECTION INTRAMUSCULAR; INTRAVENOUS at 07:14

## 2018-11-06 RX ADMIN — LOSARTAN POTASSIUM 50 MG: 50 TABLET ORAL at 10:21

## 2018-11-06 RX ADMIN — FENTANYL CITRATE 25 MCG: 50 INJECTION, SOLUTION INTRAMUSCULAR; INTRAVENOUS at 07:33

## 2018-11-06 RX ADMIN — HEPARIN SODIUM 5000 UNITS: 5000 INJECTION INTRAVENOUS; SUBCUTANEOUS at 21:18

## 2018-11-06 RX ADMIN — MIDAZOLAM 0.5 MG: 1 INJECTION INTRAMUSCULAR; INTRAVENOUS at 07:06

## 2018-11-06 RX ADMIN — MIDAZOLAM 0.5 MG: 1 INJECTION INTRAMUSCULAR; INTRAVENOUS at 07:24

## 2018-11-06 RX ADMIN — LIDOCAINE HYDROCHLORIDE 30 ML: 10 INJECTION, SOLUTION INFILTRATION; PERINEURAL at 07:16

## 2018-11-06 RX ADMIN — LEVALBUTEROL HYDROCHLORIDE 1.25 MG: 1.25 SOLUTION, CONCENTRATE RESPIRATORY (INHALATION) at 19:08

## 2018-11-06 RX ADMIN — LIDOCAINE HYDROCHLORIDE 10 ML: 10 INJECTION, SOLUTION INFILTRATION; PERINEURAL at 07:17

## 2018-11-06 RX ADMIN — IOHEXOL 10 ML: 350 INJECTION, SOLUTION INTRAVENOUS at 07:24

## 2018-11-06 RX ADMIN — ISODIUM CHLORIDE 3 ML: 0.03 SOLUTION RESPIRATORY (INHALATION) at 19:08

## 2018-11-06 RX ADMIN — TAMSULOSIN HYDROCHLORIDE 0.4 MG: 0.4 CAPSULE ORAL at 17:47

## 2018-11-06 RX ADMIN — MIDAZOLAM 0.5 MG: 1 INJECTION INTRAMUSCULAR; INTRAVENOUS at 07:20

## 2018-11-06 RX ADMIN — INSULIN LISPRO 2 UNITS: 100 INJECTION, SOLUTION INTRAVENOUS; SUBCUTANEOUS at 11:33

## 2018-11-06 RX ADMIN — CEFEPIME HYDROCHLORIDE 2000 MG: 2 INJECTION, POWDER, FOR SOLUTION INTRAVENOUS at 10:29

## 2018-11-06 RX ADMIN — INSULIN LISPRO 2 UNITS: 100 INJECTION, SOLUTION INTRAVENOUS; SUBCUTANEOUS at 17:35

## 2018-11-06 RX ADMIN — SODIUM CHLORIDE: 0.9 INJECTION, SOLUTION INTRAVENOUS at 06:41

## 2018-11-06 RX ADMIN — GENTAMICIN SULFATE 80 MG: 40 INJECTION, SOLUTION INTRAMUSCULAR; INTRAVENOUS at 07:44

## 2018-11-06 RX ADMIN — VITAMIN D, TAB 1000IU (100/BT) 1000 UNITS: 25 TAB at 10:30

## 2018-11-06 NOTE — QUICK NOTE
Right Femoral Arterial and Venous sheaths removed  Manual pressure held until hemostasis obtained  No evidence of hematoma  Sterile dressing applied  Pt instructed to keep right leg straight

## 2018-11-06 NOTE — PROGRESS NOTES
Progress Note - Critical Care   Tasneem Adair 70 y o  male MRN: [id number]  Unit/Bed#: ICU 03 Encounter: [id number]    Assessment/Plan:  1  Complete heart block status post temporary pacemaker placement  · Okay for permanent pacemaker placement from a critical care perspective  2  Fever and leukocytosis-improved  · Currently on empiric cefepime to complete a 7 day course, currently day 5  3  Atrial fibrillation with rapid ventricular response-rate controlled  · Would hold additional Lopressor dosing  4  Hypertension-currently off Cardene  · Continue Norvasc for now  5  Acute hypoxic respiratory failure likely related to pulmonary edema, possible component of obstructive sleep apnea  · Will continue with nocturnal BiPAP for now  · Continue to wean his oxygen with a goal to maintain his saturations greater than 90%  · Encourage cough, deep breathing and incentive spirometer use  6  Morbid obesity with obstructive sleep apnea  · Will need an outpatient sleep study  7  Disposition:  Can likely be transferred to telemetry following his pacemaker placement later today  Critical Care Time:   Documented critical care time excludes any procedures documented elsewhere  It also excludes any family updates    _____________________________________________________________________    HPI/24hr events:   No events overnight    The patient denies chest pain or shortness of breath at present    Medications:    Current Facility-Administered Medications:  acetaminophen 650 mg Rectal Q4H PRN Willim Kohut Spatzer, CRNP    amLODIPine 10 mg Oral Daily PAT Chan    cefepime 2,000 mg Intravenous Q12H PAT Chan Last Rate: 2,000 mg (11/05/18 2025)   furosemide 40 mg Intravenous BID (diuretic) Jessie Dhillon DO    heparin (porcine) 5,000 Units Subcutaneous Q8H [address] [place] [address] PAT Ordonez    insulin lispro 1-6 Units Subcutaneous 4x Daily (AC & HS) Duarte Grijalva MD    levalbuterol 1 25 mg Nebulization TID PAT Stephenson losartan 50 mg Oral Daily Timothyigor Herrera DO    niCARdipine 1-15 mg/hr Intravenous Titrated PAT Ricci Last Rate: Stopped (11/05/18 1215)   pantoprazole 40 mg Intravenous Q24H [facility] & assisted PAT Olsen    sodium chloride 3 mL Nebulization TID Josh Lantigua MD          niCARdipine 1-15 mg/hr Last Rate: Stopped (11/05/18 1215)         Physical exam:  Vitals: Body mass index is 35 93 kg/m²  Blood pressure (!) 190/63, pulse 80, temperature 100 3 °F (37 9 °C), temperature source Temporal, resp  rate 20, height 5' 10" (1 778 m), weight 114 kg (250 lb 7 1 oz), SpO2 98 % ,  Temp  Min: 94 3 °F (34 6 °C)  Max: 101 8 °F (38 8 °C)  IBW: 73 kg    SpO2: 98 %  SpO2 Activity: At Rest  O2 Device: Nasal cannula      Intake/Output Summary (Last 24 hours) at 11/06/18 0338  Last data filed at 11/05/18 2307   Gross per 24 hour   Intake           343 33 ml   Output             1855 ml   Net         -1511 67 ml       Invasive/non-invasive ventilation settings:   Respiratory    Lab Data (Last 4 hours)    None         O2/Vent Data (Last 4 hours)      11/06 0056          Non-Invasive Ventilation Mode BiPAP                 Invasive Devices     Peripheral Intravenous Line            Peripheral IV 11/01/18 Right Antecubital 5 days    Peripheral IV 11/03/18 Left Arm 2 days          Line            Arterial Sheath Other (Comment) Right Femoral 4 days    Pacer Wires 4 days    Venous Sheath 6 Fr  Right Femoral 4 days                  Physical Exam:  Gen:  Awake and alert  HEENT:  Pupils are equal round and reactive to light    The oropharynx is without erythema  Neck:  Supple negative for lymphadenopathy  Chest:  Diminished with crackles in the bases bilaterally  Cor:  Slightly irregular but rate controlled  Abd:  Obese but soft and nontender  Ext:  There is pitting edema in his bilateral lower extremities  Neuro:  Awake and alert, able to move his extremities  Skin:  Warm and dry      Diagnostic Data:  Lab: I have personally reviewed pertinent lab results  Microbiology:    Results from last 7 days  Lab Units 11/02/18  0836 11/01/18  2309 11/01/18  2305   BLOOD CULTURE   --  No Growth at 72 hrs  No Growth at 72 hrs  INFLUENZA B PCR  None Detected  --   --    RSV PCR  None Detected  --   --        Imaging:      Cardiac lab/EKG/telemetry/ECHO:       VTE Prophylaxis:  Heparin    Code Status: Level 1 - Full Code    PAT Argueta    Portions of the record may have been created with voice recognition software  Occasional wrong word or "sound a like" substitutions may have occurred due to the inherent limitations of voice recognition software  Read the chart carefully and recognize, using context, where substitutions have occurred

## 2018-11-06 NOTE — PROGRESS NOTES
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 longstanding type 2 diabetes mellitus  5   Urinary retention    PLAN:    -creatinine back to baseline with stage 3 chronic kidney disease  -patient known to me as an outpatient was on metoprolol 100 mg 2 times daily, losartan 100 mg daily, furosemide 40 mg daily as needed, and chlorthalidone 25 mg daily along with amlodipine 10 mg daily  -now off nicardipine drip  -Amlodipine re-initiated, losartan re-initiated at a lower dose of 50 mg daily  -blood pressure now stable averaging 223-264 systolic which should be goal  -continue diuresis with Lasix 40 mg IV b i d   -now with some urinary retention and a Jones catheter to be placed  -serologic workup was done for his proteinuria as an outpatient and was negative, creatinine was stable with a history of diabetes will monitor and treat conservatively     SUBJECTIVE:    Patient seen after pacemaker placed with urinary retention over a L of urine in his bladder currently     OBJECTIVE:  Current Weight: Weight - Scale: 113 kg (250 lb)  Vitals:    11/06/18 0854   BP: 133/79   Pulse: (!) 130   Resp: (!) 29   Temp:    SpO2: 94%       Intake/Output Summary (Last 24 hours) at 11/06/18 0940  Last data filed at 11/06/18 5979   Gross per 24 hour   Intake              550 ml   Output             1905 ml   Net            -1355 ml       General: conscious, cooperative, in not acute distress  Eyes: conjunctivae pink, anicteric sclerae  ENT:  Wearing a simple mask  Neck: supple, no JVD  Chest:  Decreased breath sounds  CVS: distinct S1 & S2, normal rate, regular rhythm  Abdomen: soft, non-tender, non-distended, normoactive bowel sounds  Extremities:  1+ edema  Skin: no rash  Neuro: awake, alert, oriented    Medications:    Current Facility-Administered Medications:     acetaminophen (TYLENOL) rectal suppository 650 mg, 650 mg, Rectal, Q4H PRN, Antonetta Gimenez Spatzer, CRNP, 650 mg at 11/02/18 0507    amLODIPine (NORVASC) tablet 10 mg, 10 mg, Oral, Daily, PAT Chan, 10 mg at 11/05/18 0908    cefepime (MAXIPIME) 2,000 mg in dextrose 5 % 50 mL IVPB, 2,000 mg, Intravenous, Q12H, PAT Chan, Last Rate: 100 mL/hr at 11/05/18 2025, 2,000 mg at 11/05/18 2025    furosemide (LASIX) injection 40 mg, 40 mg, Intravenous, BID (diuretic), Glolove Caldwell DO, 40 mg at 11/05/18 1638    heparin (porcine) subcutaneous injection 5,000 Units, 5,000 Units, Subcutaneous, Q8H [facility] & half-way, 5,000 Units at 11/06/18 0512 **AND** Platelet count, , , Once, PAT Olsen    insulin lispro (HumaLOG) 100 units/mL subcutaneous injection 1-6 Units, 1-6 Units, Subcutaneous, 4x Daily (AC & HS), 2 Units at 11/05/18 2107 **AND** Fingerstick Glucose (POCT), , , 4x Daily AC and at bedtime, Daryl Fernández MD    levalbuterol Mat Mellette) inhalation solution 1 25 mg, 1 25 mg, Nebulization, TID, PAT Chan, 1 25 mg at 11/05/18 1932    losartan (COZAAR) tablet 50 mg, 50 mg, Oral, Daily, Jane Fallen DO    niCARdipine (CARDENE) 25 mg (STANDARD CONCENTRATION) in sodium chloride 0 9% 250 mL, 1-15 mg/hr, Intravenous, Titrated, Magdalena Klinefelter, CRNP, Stopped at 11/05/18 1215    pantoprazole (PROTONIX) injection 40 mg, 40 mg, Intravenous, Q24H [facility] & half-way, PAT Olsen, 40 mg at 11/05/18 0908    sodium chloride 0 9 % inhalation solution 3 mL, 3 mL, Nebulization, TID, Daryl Fernández MD, 3 mL at 11/05/18 1932    tamsulosin (FLOMAX) capsule 0 4 mg, 0 4 mg, Oral, Daily With Stephania Damon PAT Mcgregor    Invasive Devices:      Lab Results:     Results from last 7 days  Lab Units 11/06/18  0445 11/05/18  0457 11/04/18  0433 11/03/18  0433 11/02/18  0415 11/02/18  0204 11/01/18  2309 11/01/18  2305 11/01/18  2302 11/01/18  1630 11/01/18  1614 11/01/18  1215   WBC Thousand/uL 11 32* 15 67* 16 75* 15 35* 21 58*  --   --   --   --   --   --  20 67*   HEMOGLOBIN g/dL 9 1* 9 1* 9 0* 9 0* 8 8*  --   --   --   --   --   --  11 4*   HEMATOCRIT % 27 7* 27 9* 27 7* 26 5* 26 9*  --   --   --   --  <15*  --  35 6*   PLATELETS Thousands/uL 286 255 215 225 303  --   --   --   --   --  346 397*   POTASSIUM mmol/L  --  3 9 4 4 4 3 4 1  --   --   --  4 0  --  4 9 5 2   CHLORIDE mmol/L  --  104 106 111* 107  --   --   --  107  --  107 106   CO2 mmol/L  --  23 23 24 23  --   --   --  20*  --  17* 18*   CO2, I-STAT mmol/L  --   --   --   --   --   --   --   --   --  7*  --   --    BUN mg/dL  --  37* 34* 40* 43*  --   --   --  40*  --  39* 37*   CREATININE mg/dL  --  1 24 1 30 1 73* 3 06*  --   --   --  2 85*  --  2 36* 1 99*   CALCIUM mg/dL  --  8 4 8 3 7 9* 7 7*  --   --   --  7 6*  --  8 0* 8 8   MAGNESIUM mg/dL  --  2 1 2 0  --   --   --   --   --   --   --  2 4 2 3   PHOSPHORUS mg/dL  --   --   --   --   --   --   --   --   --   --  8 1*  --    ALK PHOS U/L  --   --   --   --   --   --   --   --   --   --  80 85   ALT U/L  --   --   --   --   --   --   --   --   --   --  61 33   AST U/L  --   --   --   --   --   --   --   --   --   --  65* 28   GLUCOSE, ISTAT mg/dl  --   --   --   --   --   --   --   --   --  142*  --   --    BLOOD CULTURE   --   --   --   --   --   --  No Growth After 4 Days  No Growth After 4 Days    --   --   --   --    LEUKOCYTES UA   --   --   --   --   --  Negative  --   --   --   --   --   --    BLOOD UA   --   --   --   --   --  Moderate*  --   --   --   --   --   --        Previous work up:  Please see previous notes

## 2018-11-06 NOTE — ANESTHESIA PREPROCEDURE EVALUATION
Review of Systems/Medical History          Cardiovascular  Hyperlipidemia, Hypertension , Dysrhythmias , atrial fibrillation and 3rd degree block,    Pulmonary  Sleep apnea ,   Comment: History of acute hypoxic respiratory failure     GI/Hepatic  Negative GI/hepatic ROS               Endo/Other  Diabetes ,   Obesity    GYN       Hematology   Musculoskeletal    Arthritis     Neurology  Negative neurology ROS      Psychology   Negative psychology ROS              Physical Exam    Airway    Mallampati score: III  TM Distance: >3 FB  Neck ROM: full     Dental       Cardiovascular  Rhythm: irregular, Rate: normal,     Pulmonary  Pulmonary exam normal     Other Findings        Anesthesia Plan  ASA Score- 4     Anesthesia Type- IV sedation with anesthesia and general with ASA Monitors  Additional Monitors:   Airway Plan:         Plan Factors-    Induction- intravenous  Postoperative Plan-     Informed Consent- Anesthetic plan and risks discussed with patient

## 2018-11-07 LAB
ANION GAP SERPL CALCULATED.3IONS-SCNC: 9 MMOL/L (ref 4–13)
BACTERIA BLD CULT: NORMAL
BACTERIA BLD CULT: NORMAL
BUN SERPL-MCNC: 37 MG/DL (ref 5–25)
CALCIUM SERPL-MCNC: 8.5 MG/DL (ref 8.3–10.1)
CHLORIDE SERPL-SCNC: 102 MMOL/L (ref 100–108)
CO2 SERPL-SCNC: 24 MMOL/L (ref 21–32)
CREAT SERPL-MCNC: 1.26 MG/DL (ref 0.6–1.3)
ERYTHROCYTE [DISTWIDTH] IN BLOOD BY AUTOMATED COUNT: 14 % (ref 11.6–15.1)
GFR SERPL CREATININE-BSD FRML MDRD: 59 ML/MIN/1.73SQ M
GLUCOSE SERPL-MCNC: 170 MG/DL (ref 65–140)
GLUCOSE SERPL-MCNC: 180 MG/DL (ref 65–140)
GLUCOSE SERPL-MCNC: 189 MG/DL (ref 65–140)
GLUCOSE SERPL-MCNC: 203 MG/DL (ref 65–140)
GLUCOSE SERPL-MCNC: 282 MG/DL (ref 65–140)
HCT VFR BLD AUTO: 25.9 % (ref 36.5–49.3)
HGB BLD-MCNC: 8.5 G/DL (ref 12–17)
MAGNESIUM SERPL-MCNC: 2 MG/DL (ref 1.6–2.6)
MCH RBC QN AUTO: 30.6 PG (ref 26.8–34.3)
MCHC RBC AUTO-ENTMCNC: 32.8 G/DL (ref 31.4–37.4)
MCV RBC AUTO: 93 FL (ref 82–98)
PLATELET # BLD AUTO: 272 THOUSANDS/UL (ref 149–390)
PMV BLD AUTO: 9.4 FL (ref 8.9–12.7)
POTASSIUM SERPL-SCNC: 4 MMOL/L (ref 3.5–5.3)
RBC # BLD AUTO: 2.78 MILLION/UL (ref 3.88–5.62)
SODIUM SERPL-SCNC: 135 MMOL/L (ref 136–145)
WBC # BLD AUTO: 10.15 THOUSAND/UL (ref 4.31–10.16)

## 2018-11-07 PROCEDURE — 94640 AIRWAY INHALATION TREATMENT: CPT

## 2018-11-07 PROCEDURE — 85027 COMPLETE CBC AUTOMATED: CPT | Performed by: NURSE PRACTITIONER

## 2018-11-07 PROCEDURE — 83735 ASSAY OF MAGNESIUM: CPT | Performed by: INTERNAL MEDICINE

## 2018-11-07 PROCEDURE — 99232 SBSQ HOSP IP/OBS MODERATE 35: CPT | Performed by: INTERNAL MEDICINE

## 2018-11-07 PROCEDURE — 94660 CPAP INITIATION&MGMT: CPT

## 2018-11-07 PROCEDURE — 82948 REAGENT STRIP/BLOOD GLUCOSE: CPT

## 2018-11-07 PROCEDURE — 94760 N-INVAS EAR/PLS OXIMETRY 1: CPT

## 2018-11-07 PROCEDURE — C9113 INJ PANTOPRAZOLE SODIUM, VIA: HCPCS | Performed by: NURSE PRACTITIONER

## 2018-11-07 PROCEDURE — 99233 SBSQ HOSP IP/OBS HIGH 50: CPT | Performed by: INTERNAL MEDICINE

## 2018-11-07 PROCEDURE — 99222 1ST HOSP IP/OBS MODERATE 55: CPT | Performed by: NURSE PRACTITIONER

## 2018-11-07 PROCEDURE — 80048 BASIC METABOLIC PNL TOTAL CA: CPT | Performed by: INTERNAL MEDICINE

## 2018-11-07 RX ORDER — METOPROLOL TARTRATE 50 MG/1
50 TABLET, FILM COATED ORAL EVERY 12 HOURS SCHEDULED
Status: DISCONTINUED | OUTPATIENT
Start: 2018-11-07 | End: 2018-11-16 | Stop reason: HOSPADM

## 2018-11-07 RX ORDER — DILTIAZEM HYDROCHLORIDE 5 MG/ML
10 INJECTION INTRAVENOUS ONCE
Status: COMPLETED | OUTPATIENT
Start: 2018-11-07 | End: 2018-11-07

## 2018-11-07 RX ORDER — DOCUSATE SODIUM 100 MG/1
100 CAPSULE, LIQUID FILLED ORAL 2 TIMES DAILY
Status: DISCONTINUED | OUTPATIENT
Start: 2018-11-07 | End: 2018-11-16 | Stop reason: HOSPADM

## 2018-11-07 RX ORDER — AMLODIPINE BESYLATE 5 MG/1
5 TABLET ORAL DAILY
Status: DISCONTINUED | OUTPATIENT
Start: 2018-11-08 | End: 2018-11-07

## 2018-11-07 RX ORDER — METOPROLOL TARTRATE 5 MG/5ML
5 INJECTION INTRAVENOUS ONCE
Status: COMPLETED | OUTPATIENT
Start: 2018-11-07 | End: 2018-11-07

## 2018-11-07 RX ORDER — LOSARTAN POTASSIUM 25 MG/1
25 TABLET ORAL DAILY
Status: DISCONTINUED | OUTPATIENT
Start: 2018-11-08 | End: 2018-11-08

## 2018-11-07 RX ORDER — HEPARIN SODIUM 5000 [USP'U]/ML
5000 INJECTION, SOLUTION INTRAVENOUS; SUBCUTANEOUS EVERY 8 HOURS SCHEDULED
Status: DISCONTINUED | OUTPATIENT
Start: 2018-11-07 | End: 2018-11-07 | Stop reason: SDUPTHER

## 2018-11-07 RX ORDER — SENNOSIDES 8.6 MG
2 TABLET ORAL
Status: DISCONTINUED | OUTPATIENT
Start: 2018-11-07 | End: 2018-11-16 | Stop reason: HOSPADM

## 2018-11-07 RX ADMIN — METOPROLOL TARTRATE 50 MG: 50 TABLET, FILM COATED ORAL at 09:59

## 2018-11-07 RX ADMIN — INSULIN LISPRO 1 UNITS: 100 INJECTION, SOLUTION INTRAVENOUS; SUBCUTANEOUS at 16:27

## 2018-11-07 RX ADMIN — METOPROLOL TARTRATE 50 MG: 50 TABLET, FILM COATED ORAL at 21:12

## 2018-11-07 RX ADMIN — FUROSEMIDE 40 MG: 10 INJECTION, SOLUTION INTRAMUSCULAR; INTRAVENOUS at 08:26

## 2018-11-07 RX ADMIN — ASPIRIN 81 MG: 81 TABLET, COATED ORAL at 08:16

## 2018-11-07 RX ADMIN — PRAVASTATIN SODIUM 80 MG: 80 TABLET ORAL at 16:23

## 2018-11-07 RX ADMIN — METOPROLOL TARTRATE 5 MG: 5 INJECTION, SOLUTION INTRAVENOUS at 10:13

## 2018-11-07 RX ADMIN — ISODIUM CHLORIDE 3 ML: 0.03 SOLUTION RESPIRATORY (INHALATION) at 14:00

## 2018-11-07 RX ADMIN — LEVALBUTEROL HYDROCHLORIDE 1.25 MG: 1.25 SOLUTION, CONCENTRATE RESPIRATORY (INHALATION) at 14:00

## 2018-11-07 RX ADMIN — LEVALBUTEROL HYDROCHLORIDE 1.25 MG: 1.25 SOLUTION, CONCENTRATE RESPIRATORY (INHALATION) at 07:36

## 2018-11-07 RX ADMIN — CEFEPIME HYDROCHLORIDE 2000 MG: 2 INJECTION, POWDER, FOR SOLUTION INTRAVENOUS at 22:17

## 2018-11-07 RX ADMIN — LOSARTAN POTASSIUM 50 MG: 50 TABLET ORAL at 07:14

## 2018-11-07 RX ADMIN — TAMSULOSIN HYDROCHLORIDE 0.4 MG: 0.4 CAPSULE ORAL at 16:23

## 2018-11-07 RX ADMIN — ALLOPURINOL 300 MG: 100 TABLET ORAL at 08:15

## 2018-11-07 RX ADMIN — HEPARIN SODIUM 5000 UNITS: 5000 INJECTION INTRAVENOUS; SUBCUTANEOUS at 05:05

## 2018-11-07 RX ADMIN — ISODIUM CHLORIDE 3 ML: 0.03 SOLUTION RESPIRATORY (INHALATION) at 07:37

## 2018-11-07 RX ADMIN — HEPARIN SODIUM 5000 UNITS: 5000 INJECTION INTRAVENOUS; SUBCUTANEOUS at 21:12

## 2018-11-07 RX ADMIN — LEVALBUTEROL HYDROCHLORIDE 1.25 MG: 1.25 SOLUTION, CONCENTRATE RESPIRATORY (INHALATION) at 19:20

## 2018-11-07 RX ADMIN — DOCUSATE SODIUM 100 MG: 100 CAPSULE, LIQUID FILLED ORAL at 12:38

## 2018-11-07 RX ADMIN — DILTIAZEM HYDROCHLORIDE 10 MG: 5 INJECTION INTRAVENOUS at 05:50

## 2018-11-07 RX ADMIN — FUROSEMIDE 40 MG: 10 INJECTION, SOLUTION INTRAMUSCULAR; INTRAVENOUS at 16:27

## 2018-11-07 RX ADMIN — INSULIN LISPRO 1 UNITS: 100 INJECTION, SOLUTION INTRAVENOUS; SUBCUTANEOUS at 08:16

## 2018-11-07 RX ADMIN — SENNOSIDES 17.2 MG: 8.6 TABLET, FILM COATED ORAL at 21:12

## 2018-11-07 RX ADMIN — ISODIUM CHLORIDE 3 ML: 0.03 SOLUTION RESPIRATORY (INHALATION) at 19:20

## 2018-11-07 RX ADMIN — CEFEPIME HYDROCHLORIDE 2000 MG: 2 INJECTION, POWDER, FOR SOLUTION INTRAVENOUS at 08:26

## 2018-11-07 RX ADMIN — METOPROLOL TARTRATE 5 MG: 5 INJECTION, SOLUTION INTRAVENOUS at 06:09

## 2018-11-07 RX ADMIN — PANTOPRAZOLE SODIUM 40 MG: 40 INJECTION, POWDER, FOR SOLUTION INTRAVENOUS at 08:15

## 2018-11-07 RX ADMIN — HEPARIN SODIUM 5000 UNITS: 5000 INJECTION INTRAVENOUS; SUBCUTANEOUS at 14:33

## 2018-11-07 RX ADMIN — INSULIN LISPRO 4 UNITS: 100 INJECTION, SOLUTION INTRAVENOUS; SUBCUTANEOUS at 12:38

## 2018-11-07 RX ADMIN — DOCUSATE SODIUM 100 MG: 100 CAPSULE, LIQUID FILLED ORAL at 21:12

## 2018-11-07 RX ADMIN — INSULIN LISPRO 1 UNITS: 100 INJECTION, SOLUTION INTRAVENOUS; SUBCUTANEOUS at 22:18

## 2018-11-07 RX ADMIN — AMLODIPINE BESYLATE 10 MG: 5 TABLET ORAL at 07:14

## 2018-11-07 RX ADMIN — VITAMIN D, TAB 1000IU (100/BT) 1000 UNITS: 25 TAB at 08:16

## 2018-11-07 RX ADMIN — DILTIAZEM HYDROCHLORIDE 10 MG: 5 INJECTION INTRAVENOUS at 05:22

## 2018-11-07 NOTE — PLAN OF CARE
CARDIOVASCULAR - ADULT     Maintains optimal cardiac output and hemodynamic stability Progressing     Absence of cardiac dysrhythmias or at baseline rhythm Progressing        DISCHARGE PLANNING - CARE MANAGEMENT     Discharge to post-acute care or home with appropriate resources Progressing        GENITOURINARY - ADULT     Maintains or returns to baseline urinary function Progressing     Absence of urinary retention Progressing     Urinary catheter remains patent Progressing        NEUROSENSORY - ADULT     Achieves stable or improved neurological status Progressing     Absence of seizures Progressing     Remains free of injury related to seizures activity Progressing     Achieves maximal functionality and self care Progressing        Nutrition/Hydration-ADULT     Nutrient/Hydration intake appropriate for improving, restoring or maintaining nutritional needs Progressing        Potential for Falls     Patient will remain free of falls Progressing        Prexisting or High Potential for Compromised Skin Integrity     Skin integrity is maintained or improved Progressing        RESPIRATORY - ADULT     Achieves optimal ventilation and oxygenation Progressing

## 2018-11-07 NOTE — PROGRESS NOTES
Progress Note - Critical Care   Grace Lyon 70 y o  male MRN: [id number]  Unit/Bed#: ICU 03 Encounter: [id number]    Assessment/Plan:  1  Complete heart block status post permanent pacemaker placement   · Cardiology following, continue care as per recommendations  · Pacemaker site without any signs of infection, arm sling is in place  · Patient in atrial fibrillation on monitor received IV Cardizem for rate control, p r n  Doses ordered Q 6, continue amlodipine, losartan and Lasix  · Maintain close hemodynamic monitoring, patient may  be downgraded to med surg with telemetry if he remains stable    2  Atrial fibrillation with rapid ventricular response- rate controlled  · Continue interventions as above    3  Urinary retention  · Nephrology following, treat as per recommendations  · Jones catheter in place, patient is making good urine, creatinine improved, patient started on Flomax    4  Fever and leukocytosis - resolving   · Patient has been afebrile, white blood cell count trending down 10 15 this morning  · Unable to identify source of infection, cefepime day 6/7    5  Acute hypoxic respiratory failure likely related to pulmonary edema  · Continue BiPAP at bedtime, Lasix b i d , encourage cough deep breathing and incentive spirometer use    6  Hypertension  · Cardiology following  · Patient is well controlled at this time, continue home medications amlodipine, losartan and Lasix    7  Morbid obesity with obstructive sleep apnea  · Continue BiPAP at night during hospital stay, recommend outpatient sleep study     _____________________________________________________________________    HPI/24hr events:   No acute events overnight      Medications:    Current Facility-Administered Medications:  acetaminophen 650 mg Rectal Q4H PRN Chick Hammers Spatzer, CRNP    allopurinol 300 mg Oral Daily PAT Berger Se    amLODIPine 10 mg Oral Daily PAT Chan    aspirin 81 mg Oral Daily PAT Blevins cefepime 2,000 mg Intravenous Q12H PAT Gross Last Rate: 2,000 mg (11/06/18 2039)   cholecalciferol 1,000 Units Oral Daily PAT Olsen    diltiazem 10 mg Intravenous Q6H PRN PAT Savage    furosemide 40 mg Intravenous BID (diuretic) Gretel Sutherland DO    heparin (porcine) 5,000 Units Subcutaneous Q8H [address] PAT Olsen    insulin lispro 1-6 Units Subcutaneous 4x Daily (AC & HS) Elisha Simmons MD    levalbuterol 1 25 mg Nebulization TID Sherol Forts Sonday, PAT    losartan 50 mg Oral Daily Eladio Patton DO    pantoprazole 40 mg Intravenous Q24H [address] PAT Olsen    pravastatin 80 mg [facility] Ordonez    sodium chloride 3 mL Nebulization TID Elisha Simmons MD    tamsulosin 0 4 mg Oral Daily With Dinner PAT Manzano               Physical exam:  Vitals: Body mass index is 35 56 kg/m²  Blood pressure 127/72, pulse (!) 140, temperature 98 1 °F (36 7 °C), temperature source Temporal, resp  rate (!) 31, height 5' 10" (1 778 m), weight 112 kg (247 lb 12 8 oz), SpO2 93 %  ,  Temp  Min: 94 3 °F (34 6 °C)  Max: 101 8 °F (38 8 °C)  IBW: 73 kg    SpO2: 93 %  SpO2 Activity: At Rest  O2 Device: Nasal cannula      Intake/Output Summary (Last 24 hours) at 11/07/18 0616  Last data filed at 11/07/18 0600   Gross per 24 hour   Intake              580 ml   Output             3480 ml   Net            -2900 ml       Invasive/non-invasive ventilation settings:   Respiratory    Lab Data (Last 4 hours)    None         O2/Vent Data (Last 4 hours)      11/07 0314          Non-Invasive Ventilation Mode BiPAP                 Invasive Devices     Peripheral Intravenous Line            Peripheral IV 11/06/18 Right Arm less than 1 day          Drain            Urethral Catheter Non-latex 16 Fr  less than 1 day                  Physical Exam:  Gen:  Appears comfortable, answers questions appropriately  HEENT:  Normocephalic  PERRLA  EOMs intact    Mucous membranes pink and moist   No trouble swallowing  Neck:  Full range of motion, no adenopathy noted  Chest:  Symmetrical, diminished at bases bilaterally with mild crackles  Cor:  S1-S2, AFib, ppm  Abd:  Soft, rounded, positive bowel sounds  Ext:  Full range of motion, +1  bilateral lower extremity edema  Neuro:  Awake and alert, follows commands  Skin:  Warm, dry and intact      Diagnostic Data:  Lab: I have personally reviewed pertinent lab results  CBC:     Results from last 7 days  Lab Units 11/07/18 0453 11/06/18 0445 11/05/18 0457   WBC Thousand/uL 10 15 11 32* 15 67*   HEMOGLOBIN g/dL 8 5* 9 1* 9 1*   HEMATOCRIT % 25 9* 27 7* 27 9*   PLATELETS Thousands/uL 272 286 255       CMP:     Results from last 7 days  Lab Units 11/05/18 0457 11/04/18 0433 11/03/18  0433  11/01/18  1630 11/01/18  1614 11/01/18  1215   POTASSIUM mmol/L 3 9 4 4 4 3  < >  --  4 9 5 2   CHLORIDE mmol/L 104 106 111*  < >  --  107 106   CO2 mmol/L 23 23 24  < >  --  17* 18*   CO2, I-STAT mmol/L  --   --   --   --  7*  --   --    BUN mg/dL 37* 34* 40*  < >  --  39* 37*   CREATININE mg/dL 1 24 1 30 1 73*  < >  --  2 36* 1 99*   CALCIUM mg/dL 8 4 8 3 7 9*  < >  --  8 0* 8 8   ALK PHOS U/L  --   --   --   --   --  80 85   ALT U/L  --   --   --   --   --  61 33   AST U/L  --   --   --   --   --  65* 28   GLUCOSE, ISTAT mg/dl  --   --   --   --  142*  --   --    < > = values in this interval not displayed  PT/INR:   No results found for: PT, INR,   Magnesium:     Results from last 7 days  Lab Units 11/05/18 0457 11/04/18  0433 11/01/18  1614   MAGNESIUM mg/dL 2 1 2 0 2 4     Phosphorous:     Results from last 7 days  Lab Units 11/01/18  1614   PHOSPHORUS mg/dL 8 1*       Microbiology:    Results from last 7 days  Lab Units 11/02/18  0836 11/01/18  2309 11/01/18  2305   BLOOD CULTURE   --  No Growth After 4 Days  No Growth After 4 Days     INFLUENZA B PCR  None Detected  --   --    RSV PCR  None Detected  --   --        Imaging:  Chest x-ray- IMPRESSION:     1   Status post left chest wall pacemaker insertion      2  Diffuse bilateral interstitial and airspace opacities consistent with edema are not significantly changed        Cardiac lab/EKG/telemetry/ECHO:       VTE Prophylaxis:  Heparin SQ and SCDs    Code Status: Level 1 - Full Code    PAT Abbasi    Portions of the record may have been created with voice recognition software  Occasional wrong word or "sound a like" substitutions may have occurred due to the inherent limitations of voice recognition software  Read the chart carefully and recognize, using context, where substitutions have occurred

## 2018-11-07 NOTE — PLAN OF CARE
CARDIOVASCULAR - ADULT     Maintains optimal cardiac output and hemodynamic stability Progressing        NEUROSENSORY - ADULT     Achieves stable or improved neurological status Progressing     Absence of seizures Progressing     Remains free of injury related to seizures activity Progressing     Achieves maximal functionality and self care Progressing        Nutrition/Hydration-ADULT     Nutrient/Hydration intake appropriate for improving, restoring or maintaining nutritional needs Progressing        Potential for Falls     Patient will remain free of falls Progressing        Prexisting or High Potential for Compromised Skin Integrity     Skin integrity is maintained or improved Progressing        RESPIRATORY - ADULT     Achieves optimal ventilation and oxygenation Progressing

## 2018-11-07 NOTE — PROGRESS NOTES
NEPHROLOGY PROGRESS NOTE   Buster Both 70 y o  male MRN: [id number]  Unit/Bed#: ICU 03 Encounter: [id number]      ASSESSMENT:    12-year-old male presented with bradycardia and complete heart block status post permanent pacemaker placement complicated by hypotension and acute kidney injury    1  Acute kidney injury secondary to ATN in the setting of diuretics and ARB use with stage 3 chronic kidney disease  2  Anion gap metabolic acidosis resolved  3  Diastolic congestive heart failure and bradycardia with complete heart block  4  Proteinuric kidney disease in the setting of longstanding type 2 diabetes mellitus  5  Urinary retention    PLAN:    -creatinine back to baseline repeat a BMP and magnesium as patient is being diuresed, may need electrolyte repletion  -patient known to me as an outpatient was on metoprolol 100 mg 2 times daily, losartan 100 mg daily, furosemide 40 mg daily as needed, and chlorthalidone 25 mg daily along with amlodipine 10 mg daily  -now off nicardipine drip  -blood pressures have been better controlled, hold parameters for systolic of 059 have been placed, now blood pressures have been in the 003 systolic and some will around 489-462 systolic    Will decrease amlodipine to 5 mg, decrease losartan to 25 mg daily, and can increase rate controlling medications if needed per Cardiology  -chest x-ray shows interstitial edema yesterday and does have peripheral edema as well, with a weight that is increased with Jones catheter outputs have been greater than inputs and weight is decreased would continue Lasix intravenous for 1 more day and reassess tomorrow  -serologic workup was done for his proteinuria as an outpatient and was negative, creatinine was stable with a history of diabetes will monitor and treat conservatively    -void trial after patient ambulating and sitting up bed  -hemoglobin stable at 8 5    SUBJECTIVE:    Urine output has improved with Jones catheter placement and urinary retention    OBJECTIVE:  Current Weight: Weight - Scale: 112 kg (247 lb 12 8 oz)  Vitals:    11/07/18 0800   BP:    Pulse: (!) 142   Resp: (!) 30   Temp:    SpO2:        Intake/Output Summary (Last 24 hours) at 11/07/18 4185  Last data filed at 11/07/18 0600   Gross per 24 hour   Intake               80 ml   Output             3480 ml   Net            -3400 ml     Weight (last 2 days)     Date/Time   Weight    11/07/18 0540  112 (247 8)    11/06/18 0600  113 (250)            Weight change: -1 kg (-2 lb 3 3 oz)    General: conscious, cooperative, in not acute distress  Eyes: conjunctivae pink, anicteric sclerae  ENT: lips and mucous membranes moist  Neck: supple, no JVD  Chest: clear breath sounds bilateral, no crackles, ronchus or wheezings  CVS: distinct S1 & S2, normal rate, regular rhythm  Abdomen: soft, non-tender, non-distended, normoactive bowel sounds  Extremities:  1+ edema  Skin: no rash  Neuro: awake, alert, oriented    Medications:    Current Facility-Administered Medications:     acetaminophen (TYLENOL) rectal suppository 650 mg, 650 mg, Rectal, Q4H PRN, Dominick Bell Spatzer, CRNP, 650 mg at 11/02/18 0507    allopurinol (ZYLOPRIM) tablet 300 mg, 300 mg, Oral, Daily, PAT Cool, 300 mg at 11/07/18 0815    [START ON 11/8/2018] amLODIPine (NORVASC) tablet 5 mg, 5 mg, Oral, Daily, Tiffany Ramires DO    aspirin (ECOTRIN LOW STRENGTH) EC tablet 81 mg, 81 mg, Oral, Daily, PAT Cool, 81 mg at 11/07/18 0816    cefepime (MAXIPIME) 2,000 mg in dextrose 5 % 50 mL IVPB, 2,000 mg, Intravenous, Q12H, PAT Chan, Last Rate: 100 mL/hr at 11/07/18 0826, 2,000 mg at 11/07/18 0826    cholecalciferol (VITAMIN D3) tablet 1,000 Units, 1,000 Units, Oral, Daily, PAT Cool, 1,000 Units at 11/07/18 0816    diltiazem (CARDIZEM) injection 10 mg, 10 mg, Intravenous, Q6H PRN, PAT Cool, 10 mg at 11/07/18 0522    furosemide (LASIX) injection 40 mg, 40 mg, Intravenous, BID (diuretic), Gume Britt DO, 40 mg at 11/07/18 0826    heparin (porcine) subcutaneous injection 5,000 Units, 5,000 Units, Subcutaneous, Q8H [address], 5,000 Units at 11/07/18 0505 **AND** Platelet count, , , Once, PAT Olsen    insulin lispro (HumaLOG) 100 units/mL subcutaneous injection 1-6 Units, 1-6 Units, Subcutaneous, 4x Daily (AC & HS), 1 Units at 11/07/18 0816 **AND** Fingerstick Glucose (POCT), , , 4x Daily AC and at bedtime, Tunde Lin MD    [facility]) inhalation solution 1 25 mg, 1 25 mg, Nebulization, TID, Ala Situ, ODALISNP, 1 25 mg at 11/07/18 0736    [START ON 11/8/2018] losartan (COZAAR) tablet 25 mg, 25 mg, Oral, Daily, Gume Britt DO    pantoprazole (PROTONIX) injection 40 mg, 40 mg, Intravenous, Q24H [address], PAT Olsen, 40 mg at 11/07/18 0815    pravastatin (PRAVACHOL) tablet 80 mg, 80 mg, Oral, Daily With Dinner, PAT Olsen, 80 mg at 11/06/18 1748    sodium chloride 0 9 % inhalation solution 3 mL, 3 mL, Nebulization, TID, Tunde Lin MD, 3 mL at 11/07/18 0737    tamsulosin (FLOMAX) capsule 0 4 mg, 0 4 mg, Oral, Daily With Melene Fu, ODALISNP, 0 4 mg at 11/06/18 1747    Invasive Devices:      Lab Results:     Results from last 7 days  Lab Units 11/07/18  0453 11/06/18  0445 11/05/18  0457 11/04/18  0433 11/03/18  0433 11/02/18  0415 11/02/18  0204 11/01/18  2309 11/01/18  2305 11/01/18  2302 11/01/18  1630 11/01/18  1614 11/01/18  1215   WBC Thousand/uL 10 15 11 32* 15 67* 16 75* 15 35* 21 58*  --   --   --   --   --   --  20 67*   HEMOGLOBIN g/dL 8 5* 9 1* 9 1* 9 0* 9 0* 8 8*  --   --   --   --   --   --  11 4*   HEMATOCRIT % 25 9* 27 7* 27 9* 27 7* 26 5* 26 9*  --   --   --   --  <15*  --  35 6*   PLATELETS Thousands/uL 272 286 255 215 225 303  --   --   --   --   --  346 397*   POTASSIUM mmol/L  --   --  3 9 4 4 4 3 4 1  --   --   --  4 0  --  4 9 5 2   CHLORIDE mmol/L  --   --  104 106 111* 107  --   --   --  107  --  107 106 CO2 mmol/L  --   --  23 23 24 23  --   --   --  20*  --  17* 18*   CO2, I-STAT mmol/L  --   --   --   --   --   --   --   --   --   --  7*  --   --    BUN mg/dL  --   --  37* 34* 40* 43*  --   --   --  40*  --  39* 37*   CREATININE mg/dL  --   --  1 24 1 30 1 73* 3 06*  --   --   --  2 85*  --  2 36* 1 99*   CALCIUM mg/dL  --   --  8 4 8 3 7 9* 7 7*  --   --   --  7 6*  --  8 0* 8 8   MAGNESIUM mg/dL  --   --  2 1 2 0  --   --   --   --   --   --   --  2 4 2 3   PHOSPHORUS mg/dL  --   --   --   --   --   --   --   --   --   --   --  8 1*  --    ALK PHOS U/L  --   --   --   --   --   --   --   --   --   --   --  80 85   ALT U/L  --   --   --   --   --   --   --   --   --   --   --  61 33   AST U/L  --   --   --   --   --   --   --   --   --   --   --  65* 28   GLUCOSE, ISTAT mg/dl  --   --   --   --   --   --   --   --   --   --  142*  --   --    BLOOD CULTURE   --   --   --   --   --   --   --  No Growth After 5 Days  No Growth After 5 Days    --   --   --   --    LEUKOCYTES UA   --   --   --   --   --   --  Negative  --   --   --   --   --   --    BLOOD UA   --   --   --   --   --   --  Moderate*  --   --   --   --   --   --        Previous work up:  Please see previous notes

## 2018-11-07 NOTE — CONSULTS
CONSULT    Patient Name: Yamilex Addison  Patient MRN: [id number]  Date of Service: 11/7/2018   Date / Time Note Created: 11/7/2018 1:08 PM   Referring Provider:  Francisco Young MD  Provider Creating Note: PAT Pimentel    PCP: Sajan Goncalves  Attending Provider:  Francisco Young MD    Reason for Consult: Urinary Retention    HPI --Mr Deloris White is a 40-year-old male admitted for complete heart block status post cardiac arrest without prior  history who developed high volume urinary retention requiring catheter insertion  Patient is receiving diuretics and states that he had difficulty voiding after Lasix administration  Patient endorses mild lower urinary tract symptoms at baseline without extreme bothersome urgency, frequency and denies nocturia of greater than 2 times per night, prior frequent UTI, nephrolithiasis or prior  surgical manipulation  Since catheter insertion patient has voided in excess of 4 L  Primary team have prescribed Flomax  Urologic consultation is requested for Jones management recommendations  Source:chart review and the patient   IPSS Questionnaire (AUA-7): Over the past month    1)  How often have you had a sensation of not emptying your bladder completely after you finish urinating? 1 - Less than 1 time in 5   2)  How often have you had to urinate again less than two hours after you finished urinating? 1 - Less than 1 time in 5   3)  How often have you found you stopped and started again several times when you urinated? 1 - Less than 1 time in 5   4) How difficult have you found it to postpone urination? 1 - Less than 1 time in 5   5) How often have you had a weak urinary stream?  2 - Less than half the time   6) How often have you had to push or strain to begin urination? 1 - Less than 1 time in 5   7) How many times did you most typically get up to urinate from the time you went to bed until the time you got up in the morning?   1 - 1 time   Total score:  0-7 mildly symptomatic    8-19 moderately symptomatic    20-35 severely symptomatic       Patient Active Problem List   Diagnosis    Complete heart block (HCC)    Metabolic acidosis    Acute [facility] ([facility] )    Idiopathic hypotension    Other hyperlipidemia    Sepsis ([facility] )    Paroxysmal atrial fibrillation ([facility] )    Acute respiratory failure with hypoxia (HCC)    Persistent proteinuria    Volume overload    Urinary retention       Impressions   Urinary Retention (Multi-factorial) secondary to recent acute illness (complete heart block and resultant cardiac arrest), immobility, debilitation, diuresis and mild prostatism  Recommendations  1  Maintain ojeda catheter  2  Do not remove  Not Nsg managed 3  Continue Flomax 4  In interim, increase ambulation, wean narcotics, hydrate and treat constipation  5  Void trial to be determined by our group when patient is clinically stronger  6  Patient may require ojeda catheter at home or short term rehab if patient remains sub-optimal  Will follow  Past Medical History:   Diagnosis Date    Diabetes mellitus ([place] [address] )     Diabetic foot ulcer ([place] [address] )     Eczema     Erectile dysfunction     Gout     Hyperlipidemia     Hypertension     Murmur     Nephropathy     Osteoarthritis     [facility]     Vertigo        Past Surgical History:   Procedure Laterality Date    CARPAL TUNNEL RELEASE      KNEE SURGERY      SHOULDER SURGERY         Family History   Problem Relation Age of Onset    Heart disease Father        Social History     Social History    Marital status: /Civil Union     Spouse name: N/A    Number of children: N/A    Years of education: N/A     Occupational History    Not on file       Social History Main Topics    Smoking status: Former Smoker     Packs/day: 1 00     Years: 30 00    Smokeless tobacco: Not on file      Comment: quit 10 years ago    Alcohol use No    Drug use: No    Sexual activity: Not on file     Other Topics Concern    Not on file     Social History Narrative    No narrative on file       Allergies   Allergen Reactions    Invokana [Canagliflozin]     Lamisil [Terbinafine] Blisters     Wife states " His skin peeled from head to toe"    Latex     Other Rash     palmegranate       Review of Systems  10 point review of systems negative except as noted in HPI     Chart Review   Allergies, current medications, history, problem list    Vital Signs & Physical Exam  General appearance: alert and oriented, in no acute distress, appears stated age, cooperative and moderately obese  Head: Normocephalic, without obvious abnormality, atraumatic  Neck: no adenopathy, no carotid bruit, no JVD, supple, symmetrical, trachea midline and thyroid not enlarged, symmetric, no tenderness/mass/nodules  Lungs: diminished breath sounds  Heart: regularly irregular rhythm  Abdomen: soft, non-tender; bowel sounds normal; no masses,  no organomegaly  Extremities: extremities normal, warm and well-perfused; no cyanosis, clubbing, or edema  Pulses: 2+ and symmetric  Neurologic: Grossly normal  Jones patent for clear isidro urine     Laboratory Studies  Lab Results   Component Value Date    K 4 0 11/07/2018     11/07/2018    CO2 24 11/07/2018    CO2 7 (LL) 11/01/2018    GLUCOSE 142 (H) 11/01/2018    CREATININE 1 26 11/07/2018    BUN 37 (H) 11/07/2018    MG 2 0 11/07/2018    PHOS 8 1 (H) 11/01/2018       Imaging and Other Studies  )Xr Chest Portable    Result Date: 11/6/2018  Narrative: CHEST INDICATION:   Patient s/p Pacemaker/ICD Insertion  COMPARISON:  Chest x-ray 11/4/2018 EXAM PERFORMED/VIEWS:  XR CHEST PORTABLE FINDINGS:  New left-sided chest wall pacemaker is identified  Pacemaker leads are intact  Heart shadow is enlarged but unchanged from prior exam  Diffuse bilateral interstitial and airspace opacities consistent with edema are not significantly changed  No pneumothorax   Osseous structures appear within normal limits for patient age  Impression: 1  Status post left chest wall pacemaker insertion  2   Diffuse bilateral interstitial and airspace opacities consistent with edema are not significantly changed  Workstation performed: [id number]     Xr Chest Portable    Result Date: 11/1/2018  Narrative: CHEST INDICATION:   3rd degree heart block  Patient had episodes of PEA arrest and would become bradycardic with loss of pulses and received compressions and epinephrine  Enteric tube placement  COMPARISON:  11/1/2018 EXAM PERFORMED/VIEWS:  XR CHEST PORTABLE  semierect Images: 2 FINDINGS:  Monitoring leads and clips project over the chest  Interval placement of orogastric tube, tip courses to the stomach  ET tube tip projects above the kisha  Cardiomediastinal silhouette appears stable  Central vascular congestive changes are again seen with bilateral pleural effusions and basilar volume loss versus infiltrates  No pneumothorax  Vascular sheath or catheter suggested along the right IJ vein  There is widening of the coracoclavicular distance and anchoring screw noted within the left humeral head  Impression: Lines and tubes as above  No pneumothorax  Persistent pulmonary edema with bibasilar pleural effusions and volume loss versus infiltrates  Workstation performed: [id number]     X-ray Chest 1 View Portable    Result Date: 11/1/2018  Narrative: CHEST INDICATION:   heart block  COMPARISON:  None available EXAM PERFORMED/VIEWS:  XR CHEST PORTABLE Images: 2 FINDINGS:  Supine portable chest Lungs adequately aerated  Diffuse infiltrates in both lungs more pronounced on the right than left  Cardiomegaly with pulmonary edema  Transthoracic pacer leads  EKG leads  Right jugular central line tip in the innominate vein near the confluence with the subclavian vein  Bony structures grossly intact  Impression: Mild cardiomegaly with Pulmonary edema/bilateral infiltrates   Workstation performed: [id number] Xr Chest 1 View Portable    Result Date: 11/1/2018  Narrative: CHEST INDICATION:   post intubation  COMPARISON: November 1, 2018 at 1236 hours EXAM PERFORMED/VIEWS:  XR CHEST PORTABLE Images: 2 FINDINGS:  Endotracheal tube tip 3 5 cm above kisha  Right jugular central line tip in the innominate vein near confluence with subclavian vein  Transthoracic pacer leads present  EKG leads in place  Lungs appear adequately aerated  Improving infiltrates bilaterally  Cardiomegaly with pulmonary edema  Atherosclerotic aorta  No pneumothorax or free air  Osseous structures appear within normal limits for patient age  Impression: Satisfactory endotracheal tube location  Improving pulmonary edema  Workstation performed: [id number]     Xr Chest Portable Icu    Result Date: 11/5/2018  Narrative: CHEST INDICATION:   hypoxia  COMPARISON:  11/1/2018  EXAM PERFORMED/VIEWS:  XR CHEST PORTABLE ICU FINDINGS: Cardiac mediastinal silhouette is stable  Congestive failure is identified  Probable bibasilar atelectasis again noted  Osseous structures appear within normal limits for patient age  Impression: Congestive failure and probable bibasilar atelectasis   Workstation performed: [id number]         Medications   Scheduled Meds:  Current Facility-Administered Medications:  acetaminophen 650 mg Rectal Q4H PRN Wheeler Gerhard Spatzer, CRNP    allopurinol 300 mg Oral Daily Fariba FormicPAT cueva    aspirin 81 mg Oral Daily PAT Montes    cefepime 2,000 mg Intravenous Q12H PAT Chan Last Rate: 2,000 mg (11/07/18 0089)   cholecalciferol 1,000 Units Oral Daily Genesis K BilPAT levy    diltiazem 10 mg Intravenous Q6H PRN Genesis K PAT Ordonez    docusate sodium 100 mg Oral BID Ezella NgoPAT [place]    furosemide 40 mg Intravenous BID (diuretic) Eladio Patton, DO    heparin (porcine) 5,000 Units Subcutaneous Q8H [address] Genesis K PAT Ordonez    insulin lispro 1-6 Units Subcutaneous 4x Daily (AC & HS) Haley Quesada MD levalbuterol 1 25 mg Nebulization TID [facility], CRNP    [START ON 11/8/2018] losartan 25 mg Oral Daily Timothy Herrera DO    metoprolol tartrate 50 mg Oral Q12H [address] Regis Carty MD    pantoprazole 40 mg Intravenous Q24H [address] PAT Olsen    pravastatin 80 mg Oral Daily With PAT Souza    senna 2 tablet Oral HS PAT Olsen    sodium chloride 3 mL Nebulization TID Josh Lantigua MD    tamsulosin 0 4 mg Oral Daily With Dinner PAT Stevens      Continuous Infusions:   PRN Meds:   acetaminophen    diltiazem      Total time spent with patient 25 minutes, >50% spent counseling and/or coordination of care           )PAT Maldonado

## 2018-11-07 NOTE — PROGRESS NOTES
Progress Note - Yamilex Part 70 y o  male MRN: [id number]    Unit/Bed#: ICU 03 Encounter: [id number]  Subjective:   No chest pain  Some dyspnea  No palpitations  Coughing a bit    Edema as per usual  No dizziness    Objective:   Vitals: Blood pressure 136/76, pulse (!) 142, temperature 99 4 °F (37 4 °C), temperature source Temporal, resp  rate (!) 30, height 5' 10" (1 778 m), weight 112 kg (247 lb 12 8 oz), SpO2 98 %  ,Body mass index is 35 56 kg/m²  CBC with diff:   Results from last 7 days  Lab Units 11/07/18  0453   WBC Thousand/uL 10 15   RBC Million/uL 2 78*   HEMOGLOBIN g/dL 8 5*   HEMATOCRIT % 25 9*   MCV fL 93   MCH pg 30 6   MCHC g/dL 32 8   RDW % 14 0   MPV fL 9 4   PLATELETS Thousands/uL 272     CMP:   Results from last 7 days  Lab Units 11/05/18  0457  11/01/18  1630 11/01/18  1614   POTASSIUM mmol/L 3 9  < >  --  4 9   CHLORIDE mmol/L 104  < >  --  107   CO2 mmol/L 23  < >  --  17*   CO2, I-STAT mmol/L  --   --  7*  --    BUN mg/dL 37*  < >  --  39*   CREATININE mg/dL 1 24  < >  --  2 36*   GLUCOSE, ISTAT mg/dl  --   --  142*  --    CALCIUM mg/dL 8 4  < >  --  8 0*   AST U/L  --   --   --  65*   ALT U/L  --   --   --  61   ALK PHOS U/L  --   --   --  80   EGFR ml/min/1 73sq m 61  < >  --  28   < > = values in this interval not displayed  Physical exam:  Lungs-decreased breath sounds with some inspiratory wheezing  No rales or rhonchi  Heart-irregular with grade 2 systolic murmur at the base  Tachycardic  Abdomen-obese  Nontender without mass organomegaly  Extremities-2+ edema lower extremities  Absent distal pulses    Assessment:  1  Complete heart block with shock  Status post permanent pacemaker yesterday  2, longstanding history of hypertension  BP now low normal   Patient on losartan and amlodipine  3  Acute renal failure due to low flow  Resolved  4  Rapid atrial fibrillation  New onset  5  Diabetes mellitus  6  Hyperlipidemia-on statin therapy  7  Normocytic anemia  8   Chronic lower extremity edema  9  Acute diastolic congestive heart failure  On IV Lasix  10  Mild aortic stenosis  11   mild mitral stenosis on the basis of won id mitral annular calcification      Plan:  Continue IV furosemide since patient appears to be fluid overloaded  Will add metoprolol for rate control and reduce other antihypertensive medication to allow room for metoprolol    No patient was taking 100 mg daily at home  Hold on anticoagulation at this point so as to avoid pocket hematoma  Continue statin  Follow electrolytes and hemoglobin closely    Shashi Enrique MD

## 2018-11-07 NOTE — PROGRESS NOTES
Progress Note - ICU Transfer to SD/MS tele/MS   Ester Mcgill 70 y o  male MRN: [id number]  [facility]   Unit/Bed#: ICU 03 Encounter: [id number]    Code Status: Level 1 - Full Code  POA:    Referring Physician: Dr Yohana Boss  Accepting Physician: Dr Sohail Linares  _____________________________________________________________________    Reason for ICU/SD admission: Complete heart block with LBBB, profound bradycardia,     History of Present Illness: 68-year-old  male with past medical history significant for hypertension, hyperlipidemia, peripheral arterial disease, gout, diabetes mellitus II, NICOLASA, who initially presented to his primary care provider 10/19/18 with complaints of dizziness  At the PCP office, the patient was diagnosed with benign paroxysmal positional vertigo and was prescribed meclizine and an outpatient MRI with instruction to return if symptoms worsened  The patient had been having increasing symptoms of dizziness, weakness, and insomnia over the past few days  On the day of admission, the patient was unable to tolerate ambulating to the bathroom due to weakness and fatigue, therefore EMS was activated  Upon arrival, the patient was found to be bradycardic with heart rate in the 20s, altered mental status, and complete heart block with LBBB was seen on ECG  The patient was brought to [address] [place] [address]  Summary of clinical course: Upon arrival to the ER an attempt to insert a transvenous pacemaker was made  During insertion, the patient cardiac arrested x 2 for approximately thirty seconds and resolved with transcutaneous pacing  No epinephrine was administered  The patient was intubated  Bedside transcutaneous pacemaker insertion was unsuccessful in achieving capture and the patient was taken to the cath lab for an emergent transvenous pacemaker insertion       After successful insertion of transvenous pacemaker, the patient arrived to the critical care unit hypotensive on epinephrine and dobutamine  The patient was found to be in severe acute metabolic acidosis, shock secondary with multifactorial etiology, and acute kidney injury  The patient was started on cefepime  Procalcitonin was initially elevated and has since trended down  Blood cultures x 2 negative  Leukocytosis resolving  Lyme's titer and Flu A/B/RSV swab negative  Pressors were weaned off 11/2/18  The patient was extubated 11/3/18  Peak creatinine was 3 06 and ELZBIETA resolving due to improved hemodynamics  A cardene drip was started for hypertension on 11/4 and able to be weaned off with addition of oral antihypertensives  The patient was noted to be in an irregular tachycardic rhythm and rate controlling agents were added to his regimen  A dual chamber permanent pacemaker was placed on 11/6/18  Per cardiology, hold on anticoagulation at this point to avoid pocket hematoma  The morning of 11/7/18 the patient was noted to be in atrial fibrillation with rapid ventricular rate, therefore home PO lopressor was restarted and patient was given IV resolution which lead to rate control  The patient failed a voiding trial and a Jones was placed  Flomax was started and urology was consulted         Consultants: Cardiology, Nephrology, Urology  _____________________________________________________________________    Diagnostic Data:  CBC:    Results from last 7 days  Lab Units 11/07/18  0453   WBC Thousand/uL 10 15   HEMOGLOBIN g/dL 8 5*   HEMATOCRIT % 25 9*   PLATELETS Thousands/uL 272      CMP: Lab Results   Component Value Date    K 4 0 11/07/2018     11/07/2018    CO2 24 11/07/2018    BUN 37 (H) 11/07/2018    CREATININE 1 26 11/07/2018    CALCIUM 8 5 11/07/2018    EGFR 59 11/07/2018   ,   PT/INR: No results found for: PT, INR,   Magnesium: No components found for: MAG,  Phosphorous: No results found for: PHOS    ABG: No results found for: PHART, VSU4IRU, PO2ART, GVC7VIA, O0XLWPML, BEART, SOURCE, Microbiology:    Results from last 7 days  Lab Units 11/02/18  0836 11/01/18  2309 11/01/18  2305   BLOOD CULTURE   --  No Growth After 5 Days  No Growth After 5 Days  INFLUENZA B PCR  None Detected  --   --    RSV PCR  None Detected  --   --        Imaging: I have personally reviewed the pertinent imaging studies on the PACS system  11/6/18 Chest x-ray:   1   Status post left chest wall pacemaker insertion      2  Diffuse bilateral interstitial and airspace opacities consistent with edema are not significantly changed            Cardiac/EKG/telemetry/Echo:     Results from last 7 days  Lab Units 11/01/18  2302 11/01/18  1848 11/01/18  1614 11/01/18  1215   TROPONIN I ng/mL 0 15* 0 06* 0 03 0 02   NT-PRO BNP pg/mL  --   --   --  949*     EKG: Atrial fibrillation with RVR   _____________________________________________________________________    Recent or scheduled procedures: s/p PPM placement 11/6/18    Outstanding/pending diagnostics: None     Mobilization Plan: Activity as tolerated    Home medications that are not reordered and reason why: Glimepiride and metformin due to ELZBIETA     Spoke with Dr Emeterio Badillo  regarding transfer  Please call [phone] with any questions or concerns  Portions of the record may have been created with voice recognition software  Occasional wrong word or "sound a like" substitutions may have occurred due to the inherent limitations of voice recognition software  Read the chart carefully and recognize, using context, where substitutions have occurred      Ival Salvage

## 2018-11-08 LAB
ANION GAP SERPL CALCULATED.3IONS-SCNC: 10 MMOL/L (ref 4–13)
BUN SERPL-MCNC: 35 MG/DL (ref 5–25)
CALCIUM SERPL-MCNC: 8.4 MG/DL (ref 8.3–10.1)
CHLORIDE SERPL-SCNC: 102 MMOL/L (ref 100–108)
CO2 SERPL-SCNC: 23 MMOL/L (ref 21–32)
CREAT SERPL-MCNC: 1.2 MG/DL (ref 0.6–1.3)
GFR SERPL CREATININE-BSD FRML MDRD: 63 ML/MIN/1.73SQ M
GLUCOSE SERPL-MCNC: 185 MG/DL (ref 65–140)
GLUCOSE SERPL-MCNC: 200 MG/DL (ref 65–140)
GLUCOSE SERPL-MCNC: 248 MG/DL (ref 65–140)
GLUCOSE SERPL-MCNC: 260 MG/DL (ref 65–140)
POTASSIUM SERPL-SCNC: 4.1 MMOL/L (ref 3.5–5.3)
SODIUM SERPL-SCNC: 135 MMOL/L (ref 136–145)

## 2018-11-08 PROCEDURE — 94760 N-INVAS EAR/PLS OXIMETRY 1: CPT

## 2018-11-08 PROCEDURE — G8978 MOBILITY CURRENT STATUS: HCPCS

## 2018-11-08 PROCEDURE — 94640 AIRWAY INHALATION TREATMENT: CPT

## 2018-11-08 PROCEDURE — G8988 SELF CARE GOAL STATUS: HCPCS

## 2018-11-08 PROCEDURE — 82948 REAGENT STRIP/BLOOD GLUCOSE: CPT

## 2018-11-08 PROCEDURE — 99232 SBSQ HOSP IP/OBS MODERATE 35: CPT | Performed by: INTERNAL MEDICINE

## 2018-11-08 PROCEDURE — 80048 BASIC METABOLIC PNL TOTAL CA: CPT | Performed by: NURSE PRACTITIONER

## 2018-11-08 PROCEDURE — 99232 SBSQ HOSP IP/OBS MODERATE 35: CPT | Performed by: HOSPITALIST

## 2018-11-08 PROCEDURE — 97163 PT EVAL HIGH COMPLEX 45 MIN: CPT

## 2018-11-08 PROCEDURE — 97167 OT EVAL HIGH COMPLEX 60 MIN: CPT

## 2018-11-08 PROCEDURE — G8987 SELF CARE CURRENT STATUS: HCPCS

## 2018-11-08 PROCEDURE — 94660 CPAP INITIATION&MGMT: CPT

## 2018-11-08 PROCEDURE — G8979 MOBILITY GOAL STATUS: HCPCS

## 2018-11-08 RX ORDER — FLUTICASONE PROPIONATE 50 MCG
1 SPRAY, SUSPENSION (ML) NASAL DAILY
Status: DISCONTINUED | OUTPATIENT
Start: 2018-11-08 | End: 2018-11-16 | Stop reason: HOSPADM

## 2018-11-08 RX ADMIN — HEPARIN SODIUM 5000 UNITS: 5000 INJECTION INTRAVENOUS; SUBCUTANEOUS at 14:05

## 2018-11-08 RX ADMIN — LEVALBUTEROL HYDROCHLORIDE 1.25 MG: 1.25 SOLUTION, CONCENTRATE RESPIRATORY (INHALATION) at 13:48

## 2018-11-08 RX ADMIN — INSULIN LISPRO 2 UNITS: 100 INJECTION, SOLUTION INTRAVENOUS; SUBCUTANEOUS at 21:30

## 2018-11-08 RX ADMIN — ASPIRIN 81 MG: 81 TABLET, COATED ORAL at 08:27

## 2018-11-08 RX ADMIN — INSULIN LISPRO 3 UNITS: 100 INJECTION, SOLUTION INTRAVENOUS; SUBCUTANEOUS at 17:02

## 2018-11-08 RX ADMIN — LOSARTAN POTASSIUM 25 MG: 25 TABLET ORAL at 08:27

## 2018-11-08 RX ADMIN — ALLOPURINOL 300 MG: 100 TABLET ORAL at 08:27

## 2018-11-08 RX ADMIN — HEPARIN SODIUM 5000 UNITS: 5000 INJECTION INTRAVENOUS; SUBCUTANEOUS at 05:03

## 2018-11-08 RX ADMIN — LEVALBUTEROL HYDROCHLORIDE 1.25 MG: 1.25 SOLUTION, CONCENTRATE RESPIRATORY (INHALATION) at 19:10

## 2018-11-08 RX ADMIN — ISODIUM CHLORIDE 3 ML: 0.03 SOLUTION RESPIRATORY (INHALATION) at 13:48

## 2018-11-08 RX ADMIN — HEPARIN SODIUM 5000 UNITS: 5000 INJECTION INTRAVENOUS; SUBCUTANEOUS at 21:30

## 2018-11-08 RX ADMIN — INSULIN LISPRO 3 UNITS: 100 INJECTION, SOLUTION INTRAVENOUS; SUBCUTANEOUS at 12:25

## 2018-11-08 RX ADMIN — INSULIN LISPRO 1 UNITS: 100 INJECTION, SOLUTION INTRAVENOUS; SUBCUTANEOUS at 08:27

## 2018-11-08 RX ADMIN — DILTIAZEM HYDROCHLORIDE 10 MG: 5 INJECTION INTRAVENOUS at 04:53

## 2018-11-08 RX ADMIN — SENNOSIDES 17.2 MG: 8.6 TABLET, FILM COATED ORAL at 21:31

## 2018-11-08 RX ADMIN — TAMSULOSIN HYDROCHLORIDE 0.4 MG: 0.4 CAPSULE ORAL at 17:01

## 2018-11-08 RX ADMIN — PRAVASTATIN SODIUM 80 MG: 80 TABLET ORAL at 17:02

## 2018-11-08 RX ADMIN — DOCUSATE SODIUM 100 MG: 100 CAPSULE, LIQUID FILLED ORAL at 17:01

## 2018-11-08 RX ADMIN — METOPROLOL TARTRATE 50 MG: 50 TABLET, FILM COATED ORAL at 08:27

## 2018-11-08 RX ADMIN — ISODIUM CHLORIDE 3 ML: 0.03 SOLUTION RESPIRATORY (INHALATION) at 19:10

## 2018-11-08 RX ADMIN — LEVALBUTEROL HYDROCHLORIDE 1.25 MG: 1.25 SOLUTION, CONCENTRATE RESPIRATORY (INHALATION) at 08:15

## 2018-11-08 RX ADMIN — ISODIUM CHLORIDE 3 ML: 0.03 SOLUTION RESPIRATORY (INHALATION) at 08:15

## 2018-11-08 RX ADMIN — DOCUSATE SODIUM 100 MG: 100 CAPSULE, LIQUID FILLED ORAL at 08:27

## 2018-11-08 RX ADMIN — FLUTICASONE PROPIONATE 1 SPRAY: 50 SPRAY, METERED NASAL at 21:29

## 2018-11-08 RX ADMIN — CEFEPIME HYDROCHLORIDE 2000 MG: 2 INJECTION, POWDER, FOR SOLUTION INTRAVENOUS at 08:27

## 2018-11-08 RX ADMIN — CEFEPIME HYDROCHLORIDE 2000 MG: 2 INJECTION, POWDER, FOR SOLUTION INTRAVENOUS at 21:29

## 2018-11-08 RX ADMIN — METOPROLOL TARTRATE 50 MG: 50 TABLET, FILM COATED ORAL at 21:30

## 2018-11-08 RX ADMIN — VITAMIN D, TAB 1000IU (100/BT) 1000 UNITS: 25 TAB at 08:27

## 2018-11-08 RX ADMIN — FUROSEMIDE 40 MG: 10 INJECTION, SOLUTION INTRAMUSCULAR; INTRAVENOUS at 17:01

## 2018-11-08 RX ADMIN — FUROSEMIDE 40 MG: 10 INJECTION, SOLUTION INTRAMUSCULAR; INTRAVENOUS at 08:27

## 2018-11-08 NOTE — PHYSICAL THERAPY NOTE
PT EVALUATION    70 y o     [id number]    Cardiac arrest ([facility] ) [I46 9]  Respiratory arrest ([facility] ) [R09 2]  Third degree heart block ([facility] ) [I44 2]  Weakness [R53 1]  Hypotension [I95 9]    Past Medical History:   Diagnosis Date    Diabetes mellitus ([facility] )     Diabetic foot ulcer ([facility] )     Eczema     Erectile dysfunction     Gout     Hyperlipidemia     Hypertension     Murmur     Nephropathy     Osteoarthritis     PAD (peripheral artery disease) ([facility] )     Vertigo          Past Surgical History:   Procedure Laterality Date    CARPAL TUNNEL RELEASE      KNEE SURGERY      SHOULDER SURGERY          11/08/18 1600   Note Type   Note type Eval only   Pain Assessment   Pain Location Chest  (sore from compressions)   Pain Orientation Left; Anterior   Hospital Pain Intervention(s) Ambulation/increased activity; Emotional support   Response to Interventions tolerated   Pain Rating: FLACC (Rest) - Face 0   Pain Rating: FLACC (Rest) - Legs 0   Pain Rating: FLACC (Rest) - Activity 0   Pain Rating: FLACC (Rest) - Cry 1   Pain Rating: FLACC (Rest) - Consolability 0   Score: FLACC (Rest) 1   Pain Rating: FLACC (Activity) - Face 1   Pain Rating: FLACC (Activity) - Legs 0   Pain Rating: FLACC (Activity) - Activity 1   Pain Rating: FLACC (Activity) - Cry 1   Pain Rating: FLACC (Activity) - Consolability 1   Score: FLACC (Activity) 4   Home Living   Type of Home House   Home Layout Multi-level;1/2 bath on main level  (1+1 LAURA  Bedroom on first floor, full bath on 2nd )   Bathroom Shower/Tub Walk-in shower   [address] (none)   Bathroom Accessibility Accessible  (2nd floor shower  1st floor half bath)   Home Equipment (no DME)   Additional Comments driving, very I PTA   Prior Function   Level of [place] Independent with ADLs and functional mobility   Lives With Spouse; Family  (2 children and their 4 grandchildren )   Receives Help From Family   ADL Assistance Independent   IADLs Independent   Falls in the last 6 months 1 to 4  (2* passing out)   Vocational Retired   Comments I without AD  Does housework and cooking  I PTA without AD  Wife works part time  Restrictions/Precautions   Weight Bearing Precautions Per Order No   Other Precautions Telemetry;O2;Fall Risk;Pain  (s/p PPM L with pacer precautions )   General   Additional Pertinent History Pt is 69 y/o male admitted with complete heart block, cardiac arrest x 2, resp failure, shock, CKI 2* ATN  S/P PPM on 11/6  PT consulted  Activity as tolerated  Family/Caregiver Present Yes  (spouse)   Cognition   Overall Cognitive Status WFL   Arousal/Participation Alert   Orientation Level Oriented X4   RUE Assessment   RUE Assessment WFL  (grossly at least 3+/5)   LUE Assessment   LUE Assessment X  (LUE proximal not assess 2* recent pacer, otherwise 3+/5)   RLE Assessment   RLE Assessment WFL  (+ pitting edema, grossly 4-/5)   LLE Assessment   LLE Assessment WFL  (+ pitting edema  Grossly 4-/5)   Coordination   Movements are Fluid and Coordinated 1   Light Touch   RLE Light Touch Absent   RLE Light Touch Comments distally   LLE Light Touch Absent   LLE Light Touch Comments distally   Bed Mobility   Additional Comments received sitting OOB in chair  Transfers   Sit to Stand 5  Supervision   Additional items Assist x 1; Increased time required;Armrests   Stand to Sit 4  Minimal assistance   Additional items Increased time required;Armrests; Verbal cues  (cues to keep RW with him and for hand placement)   Additional Comments cues for hand placement with technique  Ambulation/Elevation   Gait pattern Improper Weight shift;Decreased foot clearance; Inconsistent iwona; Short stride   Gait Assistance 4  Minimal assist   Additional items Assist x 1;Verbal cues; Tactile cues  (Cues for pacing and proper breathing )   Assistive Device Rolling walker   Distance Amb with 'x2   + HOROWITZ  Sats on 2L 88% with ambulation, recovers to 93%     Balance Static Sitting Good   Dynamic Sitting Fair +   Static Standing Fair   Dynamic Standing Fair -   Ambulatory Poor +   Endurance Deficit   Endurance Deficit Yes   Endurance Deficit Description HOROWITZ noted  Sats on 2L with ambulation to 88%, recovers to 93% on 2L   Activity Tolerance   Activity Tolerance Patient limited by fatigue;Treatment limited secondary to medical complications (Comment)   Medical Staff Made Aware NurseDonald   Nurse Made Aware yes   Assessment   Prognosis Good   Problem List Decreased strength;Decreased range of motion;Decreased endurance; Impaired balance;Decreased mobility; Decreased safety awareness; Impaired sensation;Decreased skin integrity;Pain  (pacer precautions )   Assessment Pt is 71 y/o male admitted with complete heart block, severe bradycardia  Cardiac arrest x 2, ELZBIETA 2* ATN, CHF, resp failure requiring intubation  S/P PPM on 11/6  PT consulted  Activity as tolerated  Prior to admission was completely independent without use of AD  I for ADLS and IADLs  Does majority of housework and +   Hx of one fall related to passing out     Currently presents with impairments in strength, balance, activity tolerance and mobility  S for sit to stand, however Khadijah stand to sit for safety  Able to ambulate with RW support 100 ft x 2 but with standing rest   + HOROWITZ noted  Sats on 2L 88%, but recovers with seated rest to 93% on 2L  Given impairments, functioning below baseline and requiring more restrictive AD will require skilled PT in order to progress and optimize functional independence and outcomes  Anticipate abiltiy to progress and achieve goals for d/c to home with family support  HHPT v OPPT  PT to cont to progress as able  Barriers to Discharge Inaccessible home environment  (2nd floor shower)   Goals   Patient Goals get better and go home  STG Expiration Date 11/18/18   Short Term Goal #1 10 days: 1)    Pt will perform bed mobility with Annalise demonstrating appropriate technique 100% of the time in order to improve function  2)  Perform all transfers with Annalise demonstrating safe and appropriate technique 100% of the time in order to improve ability to negotiate safely in home environment  3) Amb with least restrictive AD > 200'x1 with mod I in order to demonstrate ability to negotiate in home environment  4)  Improve overall strength and balance 1/2 grade in order to optimize ability to perform functional tasks and reduce fall risk  5) Increase activity tolerance to 45 minutes in order to improve endurance to functional tasks  6)  Negotiate stairs using most appropriate technique and S in order to be able to negotiate safely in home environment  7) PT for ongoing patient and family/caregiver education, DME needs and d/c planning in order to promote highest level of function in least restrictive environment  Treatment Day 0   Plan   Treatment/Interventions Functional transfer training;LE strengthening/ROM; Elevations; Therapeutic exercise; Endurance training;Patient/family training;Equipment eval/education; Bed mobility;Gait training; Compensatory technique education;Continued evaluation;Spoke to nursing;OT;Family   PT Frequency Other (Comment)  (4-6x/wk)   Recommendation   Recommendation Home with family support;Home PT;Outpatient PT   Equipment Recommended Walker  (RW   Monitor  )   PT - OK to Discharge No   Additional Comments to acheive PT goals for safe d/c to home     Modified [place] Scale   Modified [place] Scale 4   Barthel Index   Feeding 10   Bathing 0   Grooming Score 5   Dressing Score 5   Bladder Score 0   Bowels Score 10   Toilet Use Score 5   Transfers (Bed/Chair) Score 10   Mobility (Level Surface) Score 0   Stairs Score 0   Barthel Index Score 45     History: co - morbidities, fall risk, use of assistive device, assist for adl's, multiple lines, steps  Exam: impairments in locomotion, musculoskeletal, balance, skin integrity, cardiac, barthel 45  Clinical: unstable/unpredictable ( fall risk, tele, more restrictive AD, continued mangement of current conditions)  Complexity:high  Lacho Vazquez, PT

## 2018-11-08 NOTE — PROGRESS NOTES
Progress Note - Yamilex Part 1955, 70 y o  male MRN: [id number]    Unit/Bed#: E4 -01 Encounter: [id number]    Primary Care Provider: Sajan Goncalves DO   Date and time admitted to hospital: 2018 12:10 PM        Acute kidney injury [facility])   Assessment & Plan    Nearly resolved       * Complete heart block ([address] )   Assessment & Plan    Received pacer  Now tachycardic with afib  Defer to cards           Subjective:   Doing well  No chest pain  No sob      Objective:     Vitals:   Temp (24hrs), Av °F (37 2 °C), Min:98 6 °F (37 °C), Max:99 6 °F (37 6 °C)    Temp:  [98 6 °F (37 °C)-99 6 °F (37 6 °C)] 98 8 °F (37 1 °C)  HR:  [] 76  Resp:  [18-20] 20  BP: (107-157)/(60-83) 151/70  SpO2:  [89 %-100 %] 95 %  Body mass index is 36 63 kg/m²  Input and Output Summary (last 24 hours): Intake/Output Summary (Last 24 hours) at 18 1649  Last data filed at 18 1100   Gross per 24 hour   Intake              240 ml   Output             2650 ml   Net            -2410 ml       Physical Exam:     Physical Exam   HENT:   Head: Normocephalic and atraumatic  Eyes: Pupils are equal, round, and reactive to light  EOM are normal    Cardiovascular: Normal rate and regular rhythm  Exam reveals no gallop and no friction rub  No murmur heard  Pulmonary/Chest: Effort normal and breath sounds normal  He has no wheezes  He has no rales  Abdominal: Soft  Bowel sounds are normal  There is no tenderness  Musculoskeletal: He exhibits no edema  Nursing note and vitals reviewed              Additional Data:     Labs:      Results from last 7 days  Lab Units 18  0453 18  0445   WBC Thousand/uL 10 15 11 32*   HEMOGLOBIN g/dL 8 5* 9 1*   HEMATOCRIT % 25 9* 27 7*   PLATELETS Thousands/uL 272 286   NEUTROS PCT %  --  74   LYMPHS PCT %  --  11*   MONOS PCT %  --  10   EOS PCT %  --  3       Results from last 7 days  Lab Units 18  0643   POTASSIUM mmol/L 4 1   CHLORIDE mmol/L 102 CO2 mmol/L 23   BUN mg/dL 35*   CREATININE mg/dL 1 20   CALCIUM mg/dL 8 4           Results from last 7 days  Lab Units 11/08/18  1616 11/08/18  1200 11/07/18  2100 11/07/18  1613 11/07/18  1148 11/07/18  0754 11/06/18  2117 11/06/18  1652 11/06/18  1122 11/06/18  0859 11/05/18  2043 11/05/18  1634   POC GLUCOSE mg/dl 260* 248* 189* 170* 282* 180* 238* 193* 201* 210* 193* 230*               * I Have Reviewed All Lab Data     Recent Cultures (last 7 days):       Results from last 7 days  Lab Units 11/02/18  0836 11/01/18  2309 11/01/18  2305   BLOOD CULTURE   --  No Growth After 5 Days  No Growth After 5 Days  INFLUENZA B PCR  None Detected  --   --    RSV PCR  None Detected  --   --          Last 24 Hours Medication List:     Current Facility-Administered Medications:  acetaminophen 650 mg Rectal Q4H PRN Arlyce Bowers Spatzer, CRNP    allopurinol 300 mg Oral Daily PAT Tellez    aspirin 81 mg Oral Daily PAT Tellez    cefepime 2,000 mg Intravenous Q12H PAT Chan Last Rate: 2,000 mg (11/08/18 0827)   cholecalciferol 1,000 Units Oral Daily PAT Olsen    diltiazem 10 mg Intravenous Q6H PRN PAT Olsen    docusate sodium 100 mg Oral BID PAT Tellez    furosemide 40 mg Intravenous BID (diuretic) Deisy Centeno DO    heparin (porcine) 5,000 Units Subcutaneous Q8H [address] PAT Olsen    insulin lispro 1-6 Units Subcutaneous 4x Daily (AC & HS) Tremaine Aponte MD    levalbuterol 1 25 mg Nebulization TID PAT Alonso    metoprolol tartrate 50 mg Oral Q12H [address] Charna Riedel, MD    pravastatin 80 mg Oral Daily With Consuelo Osmany, CRNP    senna 2 tablet Oral HS PAT Olsen    sodium chloride 3 mL Nebulization TID Tremaine Aponte MD    tamsulosin 0 4 mg Oral Daily With Dinner PAT Mcknight          VTE Pharmacologic Prophylaxis:   Pharmacologic:         Current Length of Stay: 7 day(s)    Current Patient Status: Inpatient Discharge Plan:   Code Status: Level 1 - Full Code           Today, Patient Was Seen By: Katie Pelayo DO    ** Please Note: Dictation voice to text software may have been used in the creation of this document   **

## 2018-11-08 NOTE — OCCUPATIONAL THERAPY NOTE
[address]  Evaluation     Patient Name: Jerry Barron  QAIBDMeghanA Date: 11/8/2018  Problem List  Patient Active Problem List   Diagnosis    Complete heart block (HCC)    Metabolic acidosis    Acute kidney injury ([facility] )    Idiopathic hypotension    Other hyperlipidemia    [facility]    [facility]oxysmal [facility] fibrillation [facility]    Acute respiratory failure with hypoxia (HCC)    Persistent proteinuria    Volume overload    Urinary retention     Past Medical History  Past Medical History:   Diagnosis Date    Diabetes mellitus ([facility] )     Diabetic foot ulcer ([facility] )     Eczema     Erectile dysfunction     Gout     Hyperlipidemia     Hypertension     Murmur     Nephropathy     Osteoarthritis     PAD (peripheral artery disease) (HCC)     Vertigo      Past Surgical History  Past Surgical History:   Procedure Laterality Date    CARPAL TUNNEL RELEASE      KNEE SURGERY      SHOULDER SURGERY           11/08/18 4590   Note Type   Note type Eval only   Restrictions/Precautions   Weight Bearing Precautions Per Order No   Other Precautions O2;Telemetry;Multiple lines; Fall Risk;Pain  ((+) pacemkaer precautions)   Pain Assessment   Pain Assessment FLACC   Pain Location Chest   Pain Orientation Left; Anterior   Hospital Pain Intervention(s) Repositioned; Ambulation/increased activity; Emotional support; Rest   Response to Interventions Tolerated OT   Pain Rating: FLACC (Rest) - Face 0   Pain Rating: FLACC (Rest) - Legs 0   Pain Rating: FLACC (Rest) - Activity 0   Pain Rating: FLACC (Rest) - Cry 1   Pain Rating: FLACC (Rest) - Consolability 0   Score: FLACC (Rest) 1   Pain Rating: FLACC (Activity) - Face 1   Pain Rating: FLACC (Activity) - Legs 0   Pain Rating: FLACC (Activity) - Activity 1   Pain Rating: FLACC (Activity) - Cry 1   Pain Rating: FLACC (Activity) - Consolability 1   Score: FLACC (Activity) 4   Home Living   Type of Home House   Home Layout Multi-level;1/2 bath on main level  (1+1 LAURA;  Bedroom on 1st floor w/ 1/2 bath, full bath on 2nd)   Bathroom Shower/Tub Walk-in shower   Bathroom Toilet Standard   Bathroom Equipment Other (Comment)  (none per pt report)   [address] Other (Comment)  (none per pt report)   Additional Comments Pt lives with spouse, children, and 4 grandchildren in a multi-level house with 1+1 LAURA  Prior Function   Level of [place] Independent with ADLs and functional mobility   Lives With Spouse; Family  (2 children, 4 grandchildren)   Receives Help From Family   ADL Assistance Independent   IADLs Independent   Falls in the last 6 months 1 to 4   Vocational Retired   Comments At baseline, pt was I w/ ADLs, IADLs, and functional mobility/transfers w/o use of AD, (+) , and reports 0 falls PTA  Lifestyle   Autonomy At baseline, pt was I w/ ADLs, IADLs, and functional mobility/transfers w/o use of AD, (+) , and reports 0 falls PTA  Reciprocal Relationships Lives with spouse, children, and 4 grandchildren   Service to Others Retired- [address] for 40 years   Intrinsic Gratification Spending time with family   Psychosocial   Psychosocial (WDL) WDL   ADL   Eating Assistance 6  Modified independent   [address] 5  Supervision/Setup   LB [address] 4  [address] 5  Supervision/Setup    [address] 4  Minimal Assistance   Bed Mobility   Supine to Sit Unable to assess  (Pt seated OOB in chair at start/end of session)   Sit to Supine Unable to assess  (Pt seated OOB in chair at start/end of session)   Additional Comments Pt seated OOB in chair at end of session with call bell and phone within reach   All needs met and pt reports no further questions for OT at this time   Transfers   Sit to Stand 5  Supervision   Additional items Assist x 1;Armrests; Increased time required   Stand to Sit 4  Minimal assistance   Additional items Assist x 1; Armrests; Increased time required;Verbal cues   Additional Comments Cues for safe technique and hand placement   Functional Mobility   Functional Mobility 4  Minimal assistance   Additional Comments Assist x1   Additional items Rolling walker   Balance   Static Sitting Good   Dynamic Sitting Fair +   Static Standing Fair   Dynamic Standing Fair -   Ambulatory Poor +   Activity Tolerance   Activity Tolerance Patient limited by fatigue;Treatment limited secondary to medical complications (Comment)   Medical Staff Made Aware Joann Alegria, PT; Lisa Levi, RN   Nurse Made Aware yes   RUE Assessment   RUE Assessment [facility]   RUE Strength   RUE Overall Strength Within Functional Limits - able to perform ADL tasks with strength  (3+/5 throughout)   LUE Assessment   LUE Assessment X  (proximal not tested 2* (+) pacemaker precautions; Distal=WFL)   LUE Strength   LUE Overall Strength (proximal not tested; Distal= 3+/5)   Hand Function   Gross Motor Coordination Functional   Fine Motor Coordination Functional   Sensation   Light Touch Partial deficits in the RLE;Partial deficits in the LLE   Proprioception   Proprioception No apparent deficits   Vision-Basic Assessment   Current Vision Wears glasses all the time   Vision - Complex Assessment   Ocular Range of Motion [facility]   Acuity Able to read clock/calendar on wall without difficulty   Perception   Inattention/Neglect Appears intact   Cognition   Overall Cognitive Status [facility]   Arousal/Participation Alert; Cooperative   Attention Within functional limits   Orientation Level Oriented X4   Memory Within functional limits   Following Commands Follows one step commands without difficulty   Comments Pt pleasant and cooperative; Engages in conversation appropriately   Assessment   Limitation Decreased ADL status; Decreased UE ROM; Decreased UE strength;Decreased endurance;Decreased self-care trans;Decreased high-level ADLs   Prognosis Good   Assessment Pt is a 70 y o  male seen for OT evaluation s/p adm [facility] on [facility]/2018 w/ worsening weakness and found to have third degree heart block with a LBBB and severe bradycardia  Cardiac arrest x2  Pt intubated 11/1/18 and extubated 11/3/18  S/p Pacemaker placement 11/6/18  Comorbidities affecting pts functional performance include a significant PMH of DM, Diabetic foot ulcer, Eczema, Gout, HLD, HTN, Murmur, Nephropathy, OA, PAD, and Vertigo  Pt with active OT orders and activity orders for Activity as tolerated  Pt lives with spouse, children, and 4 grandchildren in a multi-level house with 1+1 LAURA  At baseline, pt was I w/ ADLs, IADLs, and functional mobility/transfers w/o use of AD, (+) , and reports 0 falls PTA  Upon evaluation, pt currently requires Min A-Supervision for overall ADLs and Min A-Supervision for functional mobility/transfers 2* the following deficits impacting occupational performance: weakness, decreased strength, decreased balance, decreased tolerance and increased pain  These impairments, as well at pts steps to enter environment, difficulty performing ADLS and difficulty performing IADLS  limit pts ability to safely engage in all baseline areas of occupation, including grooming, bathing, dressing, toileting, functional mobility/transfers, community mobility, laundry , house maintenance, meal prep, cleaning and leisure activities   Pt scored overall 45/100 on the Barthel Index  Based on the aforementioned OT evaluation, functional performance deficits, and assessments, pt has been identified as a high complexity evaluation   Pt to continue to benefit from continued acute OT services during hospital stay to address defined deficits and to maximize level of functional independence in the following Occupational Performance areas: grooming, bathing/shower, toilet hygiene, dressing, medication management, socialization, health maintenance, functional mobility, community mobility, clothing management, cleaning, meal prep, household maintenance, social participation and transfers to common household surfaces  From OT standpoint, recommend home with family support upon D/C  OT will continue to follow pt 3-5x/wk to address the following goals to  w/in 10-14 days:   Goals   Patient Goals To get better and go home   LTG Time Frame 10-14   Long Term Goal Please refer to LTGs listed below   Plan   Treatment Interventions ADL retraining;Functional transfer training;UE strengthening/ROM; Endurance training;Patient/family training;Equipment evaluation/education; Compensatory technique education; Energy conservation; Activityengagement   Goal Expiration Date 18   Treatment Day 0   OT Frequency 3-5x/wk   Recommendation   OT Discharge Recommendation Home with family support   OT - OK to Discharge Yes  (when medically cleared)   Barthel Index   Feeding 10   Bathing 0   Grooming Score 5   Dressing Score 5   Bladder Score 0   Bowels Score 10   Toilet Use Score 5   Transfers (Bed/Chair) Score 10   Mobility (Level Surface) Score 0   Stairs Score 0   Barthel Index Score 45   Modified [place] Scale   Modified [place] Scale 4        GOALS    1) Pt will improve activity tolerance to G for min 30 min txment sessions    2) Pt will complete UB/LB dressing/self care w/ mod I using adaptive device and DME as needed    3) Pt will complete bathing w/ Mod I w/ use of AE and DME as needed    4) Pt will complete toileting w/ mod I w/ G hygiene/thoroughness using DME as needed    5) Pt will improve functional transfers to Mod I on/off all surfaces using DME as needed w/ G balance/safety     6) Pt will improve functional mobility during ADL/IADL/leisure tasks to Mod I using DME as needed w/ G balance/safety     7) Pt will participate in simulated IADL management task to increase independence to Mod I w/ G safety and endurance    8) Pt will engage in ongoing cognitive assessment w/ G participation w/ mod I to assist w/ safe d/c planning/recommendations    9) Pt will demonstrate G carryover of pt/caregiver education and training as appropriate w/ mod I w/o cues w/ good tolerance    10) Pt will demonstrate 100% carryover of energy conservation techniques w/ mod I t/o functional I/ADL/leisure tasks w/o cues s/p skilled education         Loki Moreno, OTR/L

## 2018-11-08 NOTE — PROGRESS NOTES
Progress Note - Cardiology   Tasneem Adair 70 y o  male MRN: [id number]  Unit/Bed#: E4 -01 Encounter: [id number]      Assessment/Recommendations/Discussion:   1  Complete with shock, status post permanent pacemaker  2  New onset atrial fibrillation, adequately rate controlled  3  Acute diastolic congestive heart failure with volume overload  4  Diabetes  5  Benign essential hypertension  6  Acute kidney injury on chronic kidney disease, improved  7  Dyslipidemia  8  Mild aortic valve stenosis  9  Mild mitral valve stenosis secondary to annular calcification    · Agree with cutting back on antihypertensive to allow for heart rate control and diuresis    Appreciate Nephrology support  · Will start Eliquis 5 mg p o  B i d   Will make sure with electrophysiology, okay to start tonight or tomorrow in light of fresh pacemaker pocket  · Heart rate adequately controlled, continue metoprolol 50 mg p o  B i d   · Continue furosemide 40 mg IV b i d , diuresing well        Subjective:  Patient seen and examined, feels well, denies chest pain, does admit to some orthostasis when standing up      Physical Exam:  GEN:  NAD  HEENT:  MMM, NCAT, pink conjunctiva, EOMI, nonicteric sclera  CV:  NO JVD/HJR, irregularly irregular rhythm, NO M/R/G, +S1/S2, NO PARASTERNAL HEAVE/THRILL, +++ LE EDEMA, NO HEPATIC SYSTOLIC PULSATION, WARM EXTREMITIES  RESP:  CTAB/L  ABD:  SOFT, NT, NO GROSS ORGANOMEGALY        Vitals:   /63 (BP Location: Right arm)   Pulse 65   Temp 98 6 °F (37 °C) (Tympanic)   Resp 20   Ht 5' 10" (1 778 m)   Wt 116 kg (255 lb 4 7 oz)   SpO2 93% Comment: 4L  BMI 36 63 kg/m²   Vitals:    11/07/18 1810 11/08/18 0600   Weight: 117 kg (257 lb 8 oz) 116 kg (255 lb 4 7 oz)       Intake/Output Summary (Last 24 hours) at 11/08/18 1348  Last data filed at 11/08/18 1100   Gross per 24 hour   Intake                0 ml   Output             2850 ml   Net            -2850 ml       TELEMETRY:  Atrial fibrillation, heart rate controlled  Lab Results:    Results from last 7 days  Lab Units 11/07/18  0453   WBC Thousand/uL 10 15   HEMOGLOBIN g/dL 8 5*   HEMATOCRIT % 25 9*   PLATELETS Thousands/uL 272       Results from last 7 days  Lab Units 11/08/18  0643  11/01/18  1630 11/01/18  1614   POTASSIUM mmol/L 4 1  < >  --  4 9   CHLORIDE mmol/L 102  < >  --  107   CO2 mmol/L 23  < >  --  17*   CO2, I-STAT mmol/L  --   --  7*  --    BUN mg/dL 35*  < >  --  39*   CREATININE mg/dL 1 20  < >  --  2 36*   CALCIUM mg/dL 8 4  < >  --  8 0*   ALK PHOS U/L  --   --   --  80   ALT U/L  --   --   --  61   AST U/L  --   --   --  65*   GLUCOSE, ISTAT mg/dl  --   --  142*  --    < > = values in this interval not displayed  Results from last 7 days  Lab Units 11/08/18  0643  11/01/18  1630   POTASSIUM mmol/L 4 1  < >  --    CHLORIDE mmol/L 102  < >  --    CO2 mmol/L 23  < >  --    CO2, I-STAT mmol/L  --   --  7*   BUN mg/dL 35*  < >  --    CREATININE mg/dL 1 20  < >  --    GLUCOSE, ISTAT mg/dl  --   --  142*   CALCIUM mg/dL 8 4  < >  --    < > = values in this interval not displayed          Medications:    Current Facility-Administered Medications:     acetaminophen (TYLENOL) rectal suppository 650 mg, 650 mg, Rectal, Q4H PRN, Davee Stabile Spatzer, CRNP, 650 mg at 11/02/18 0507    allopurinol (ZYLOPRIM) tablet 300 mg, 300 mg, Oral, Daily, PAT Salazar, 300 mg at 11/08/18 0827    aspirin (ECOTRIN LOW STRENGTH) EC tablet 81 mg, 81 mg, Oral, Daily, PAT Salazar, 81 mg at 11/08/18 0827    cefepime (MAXIPIME) 2,000 mg in dextrose 5 % 50 mL IVPB, 2,000 mg, Intravenous, Q12H, PAT Chan, Last Rate: 100 mL/hr at 11/08/18 0827, 2,000 mg at 11/08/18 0827    cholecalciferol (VITAMIN D3) tablet 1,000 Units, 1,000 Units, Oral, Daily, PAT Salazar, 1,000 Units at 11/08/18 0827    diltiazem (CARDIZEM) injection 10 mg, 10 mg, Intravenous, Q6H PRN, PAT Salazar, 10 mg at 11/08/18 0453    docusate sodium (COLACE) capsule 100 mg, 100 mg, Oral, BID, Bronwyn PAT Thao, 100 mg at 11/08/18 0827    furosemide (LASIX) injection 40 mg, 40 mg, Intravenous, BID (diuretic), Eladio Patton, DO, 40 mg at 11/08/18 0827    heparin (porcine) subcutaneous injection 5,000 Units, 5,000 Units, Subcutaneous, Q8H [address], 5,000 Units at 11/08/18 0503 **AND** Platelet count, , , Once, PAT Olsen    insulin lispro (HumaLOG) 100 units/mL subcutaneous injection 1-6 Units, 1-6 Units, Subcutaneous, 4x Daily (AC & HS), 3 Units at 11/08/18 1225 **AND** Fingerstick Glucose (POCT), , , 4x Daily AC and at bedtime, Juan F Forbes MD    [facility]) inhalation solution 1 25 mg, 1 25 mg, Nebulization, TID, PAT Salmon, 1 25 mg at 11/08/18 1348    metoprolol tartrate (LOPRESSOR) tablet 50 mg, 50 mg, Oral, Q12H [address], Lamont Chen MD, 50 mg at 11/08/18 0827    pravastatin (PRAVACHOL) tablet 80 mg, 80 mg, Oral, Daily With Dinner, PAT Vidal, 80 mg at 11/07/18 1623    senna (SENOKOT) tablet 17 2 mg, 2 tablet, Oral, HS, PAT Olsen, 17 2 mg at 11/07/18 2112    sodium chloride 0 9 % inhalation solution 3 mL, 3 mL, Nebulization, TID, Juan F Forbes MD, 3 mL at 11/08/18 1348    tamsulosin (FLOMAX) capsule 0 4 mg, 0 4 mg, Oral, Daily With Dinner, PAT Vidal, 0 4 mg at 11/07/18 1623    This note was completed in part utilizing [employer] Software  Grammatical errors, random word insertions, spelling mistakes, and incomplete sentences may be an occasional consequence of this system secondary to software limitations, ambient noise, and hardware issues  If you have any questions or concerns about the content, text, or information contained within the body of this dictation, please contact the provider for clarification

## 2018-11-08 NOTE — PLAN OF CARE
Problem: PHYSICAL THERAPY ADULT  Goal: Performs mobility at highest level of function for planned discharge setting  See evaluation for individualized goals  Treatment/Interventions: Functional transfer training, LE strengthening/ROM, Elevations, Therapeutic exercise, Endurance training, Patient/family training, Equipment eval/education, Bed mobility, Gait training, Compensatory technique education, Continued evaluation, Spoke to nursing, OT, Family  Equipment Recommended: Gómez White (RW   Monitor )       See flowsheet documentation for full assessment, interventions and recommendations  Outcome: Progressing  Prognosis: Good  Problem List: Decreased strength, Decreased range of motion, Decreased endurance, Impaired balance, Decreased mobility, Decreased safety awareness, Impaired sensation, Decreased skin integrity, Pain (pacer precautions )  Assessment: Pt is 71 y/o male admitted with complete heart block, severe bradycardia  Cardiac arrest x 2, ELZBIETA 2* ATN, CHF, resp failure requiring intubation  S/P PPM on 11/6  PT consulted  Activity as tolerated  Prior to admission was completely independent without use of AD  I for ADLS and IADLs  Does majority of housework and +   Hx of one fall related to passing out     Currently presents with impairments in strength, balance, activity tolerance and mobility  S for sit to stand, however Khadijah stand to sit for safety  Able to ambulate with RW support 100 ft x 2 but with standing rest   + HOROWITZ noted  Sats on 2L 88%, but recovers with seated rest to 93% on 2L  Given impairments, functioning below baseline and requiring more restrictive AD will require skilled PT in order to progress and optimize functional independence and outcomes  Anticipate abiltiy to progress and achieve goals for d/c to home with family support  HHPT v OPPT  PT to cont to progress as able    Barriers to Discharge: Inaccessible home environment (2nd floor shower)     Recommendation: Home with family support, Home PT, Outpatient PT     PT - OK to Discharge: No    See flowsheet documentation for full assessment

## 2018-11-08 NOTE — PLAN OF CARE
Problem: OCCUPATIONAL THERAPY ADULT  Goal: Performs self-care activities at highest level of function for planned discharge setting  See evaluation for individualized goals  Treatment Interventions: ADL retraining, Functional transfer training, UE strengthening/ROM, Endurance training, Patient/family training, Equipment evaluation/education, Compensatory technique education, Energy conservation, Activityengagement          See flowsheet documentation for full assessment, interventions and recommendations  Limitation: Decreased ADL status, Decreased UE ROM, Decreased UE strength, Decreased endurance, Decreased self-care trans, Decreased high-level ADLs  Prognosis: Good  Assessment: Pt is a 70 y o  male seen for OT evaluation s/p adm to [facility] on 11/[facility]/2018 w/ worsening weakness and found to have third degree heart block with a LBBB and severe bradycardia  Cardiac arrest x2  Pt intubated 11/1/18 and extubated 11/3/18  S/p Pacemaker placement 11/6/18  Comorbidities affecting pts functional performance include a significant PMH of DM, Diabetic foot ulcer, Eczema, Gout, HLD, HTN, Murmur, Nephropathy, OA, PAD, and Vertigo  Pt with active OT orders and activity orders for Activity as tolerated  Pt lives with spouse, children, and 4 grandchildren in a multi-level house with 1+1 LAURA  At baseline, pt was I w/ ADLs, IADLs, and functional mobility/transfers w/o use of AD, (+) , and reports 0 falls PTA  Upon evaluation, pt currently requires Min A-Supervision for overall ADLs and Min A-Supervision for functional mobility/transfers 2* the following deficits impacting occupational performance: weakness, decreased strength, decreased balance, decreased tolerance and increased pain   These impairments, as well at pts steps to enter environment, difficulty performing ADLS and difficulty performing IADLS  limit pts ability to safely engage in all baseline areas of occupation, including grooming, bathing, dressing, toileting, functional mobility/transfers, community mobility, laundry , house maintenance, meal prep, cleaning and leisure activities   Pt scored overall 45/100 on the Barthel Index  Based on the aforementioned OT evaluation, functional performance deficits, and assessments, pt has been identified as a high complexity evaluation  Pt to continue to benefit from continued acute OT services during hospital stay to address defined deficits and to maximize level of functional independence in the following Occupational Performance areas: grooming, bathing/shower, toilet hygiene, dressing, medication management, socialization, health maintenance, functional mobility, community mobility, clothing management, cleaning, meal prep, household maintenance, social participation and transfers to common household surfaces  From OT standpoint, recommend home with family support upon D/C   OT will continue to follow pt 3-5x/wk to address the following goals to  w/in 10-14 days:     OT Discharge Recommendation: Home with family support  OT - OK to Discharge: Yes (when medically cleared)

## 2018-11-09 LAB
ANION GAP SERPL CALCULATED.3IONS-SCNC: 10 MMOL/L (ref 4–13)
BASOPHILS # BLD AUTO: 0.08 THOUSANDS/ΜL (ref 0–0.1)
BASOPHILS NFR BLD AUTO: 1 % (ref 0–1)
BUN SERPL-MCNC: 34 MG/DL (ref 5–25)
CALCIUM SERPL-MCNC: 8.7 MG/DL (ref 8.3–10.1)
CHLORIDE SERPL-SCNC: 102 MMOL/L (ref 100–108)
CO2 SERPL-SCNC: 23 MMOL/L (ref 21–32)
CREAT SERPL-MCNC: 1.27 MG/DL (ref 0.6–1.3)
EOSINOPHIL # BLD AUTO: 0.84 THOUSAND/ΜL (ref 0–0.61)
EOSINOPHIL NFR BLD AUTO: 8 % (ref 0–6)
ERYTHROCYTE [DISTWIDTH] IN BLOOD BY AUTOMATED COUNT: 14 % (ref 11.6–15.1)
GFR SERPL CREATININE-BSD FRML MDRD: 59 ML/MIN/1.73SQ M
GLUCOSE SERPL-MCNC: 166 MG/DL (ref 65–140)
GLUCOSE SERPL-MCNC: 169 MG/DL (ref 65–140)
GLUCOSE SERPL-MCNC: 182 MG/DL (ref 65–140)
GLUCOSE SERPL-MCNC: 276 MG/DL (ref 65–140)
GLUCOSE SERPL-MCNC: 311 MG/DL (ref 65–140)
HCT VFR BLD AUTO: 26.3 % (ref 36.5–49.3)
HGB BLD-MCNC: 8.6 G/DL (ref 12–17)
IMM GRANULOCYTES # BLD AUTO: 0.21 THOUSAND/UL (ref 0–0.2)
IMM GRANULOCYTES NFR BLD AUTO: 2 % (ref 0–2)
LYMPHOCYTES # BLD AUTO: 1.44 THOUSANDS/ΜL (ref 0.6–4.47)
LYMPHOCYTES NFR BLD AUTO: 13 % (ref 14–44)
MAGNESIUM SERPL-MCNC: 1.8 MG/DL (ref 1.6–2.6)
MCH RBC QN AUTO: 30.3 PG (ref 26.8–34.3)
MCHC RBC AUTO-ENTMCNC: 32.7 G/DL (ref 31.4–37.4)
MCV RBC AUTO: 93 FL (ref 82–98)
MONOCYTES # BLD AUTO: 0.95 THOUSAND/ΜL (ref 0.17–1.22)
MONOCYTES NFR BLD AUTO: 9 % (ref 4–12)
NEUTROPHILS # BLD AUTO: 7.32 THOUSANDS/ΜL (ref 1.85–7.62)
NEUTS SEG NFR BLD AUTO: 67 % (ref 43–75)
NRBC BLD AUTO-RTO: 0 /100 WBCS
PLATELET # BLD AUTO: 341 THOUSANDS/UL (ref 149–390)
PMV BLD AUTO: 9.3 FL (ref 8.9–12.7)
POTASSIUM SERPL-SCNC: 4.2 MMOL/L (ref 3.5–5.3)
RBC # BLD AUTO: 2.84 MILLION/UL (ref 3.88–5.62)
SODIUM SERPL-SCNC: 135 MMOL/L (ref 136–145)
WBC # BLD AUTO: 10.84 THOUSAND/UL (ref 4.31–10.16)

## 2018-11-09 PROCEDURE — 99232 SBSQ HOSP IP/OBS MODERATE 35: CPT | Performed by: HOSPITALIST

## 2018-11-09 PROCEDURE — 85025 COMPLETE CBC W/AUTO DIFF WBC: CPT | Performed by: HOSPITALIST

## 2018-11-09 PROCEDURE — 80048 BASIC METABOLIC PNL TOTAL CA: CPT | Performed by: HOSPITALIST

## 2018-11-09 PROCEDURE — 99232 SBSQ HOSP IP/OBS MODERATE 35: CPT | Performed by: INTERNAL MEDICINE

## 2018-11-09 PROCEDURE — 94640 AIRWAY INHALATION TREATMENT: CPT

## 2018-11-09 PROCEDURE — 94660 CPAP INITIATION&MGMT: CPT

## 2018-11-09 PROCEDURE — 94760 N-INVAS EAR/PLS OXIMETRY 1: CPT

## 2018-11-09 PROCEDURE — 82948 REAGENT STRIP/BLOOD GLUCOSE: CPT

## 2018-11-09 PROCEDURE — 83735 ASSAY OF MAGNESIUM: CPT | Performed by: HOSPITALIST

## 2018-11-09 RX ORDER — ACETAMINOPHEN 325 MG/1
650 TABLET ORAL 4 TIMES DAILY PRN
Status: DISCONTINUED | OUTPATIENT
Start: 2018-11-09 | End: 2018-11-16 | Stop reason: HOSPADM

## 2018-11-09 RX ADMIN — PRAVASTATIN SODIUM 80 MG: 80 TABLET ORAL at 17:54

## 2018-11-09 RX ADMIN — CEFEPIME HYDROCHLORIDE 2000 MG: 2 INJECTION, POWDER, FOR SOLUTION INTRAVENOUS at 09:46

## 2018-11-09 RX ADMIN — ACETAMINOPHEN 650 MG: 325 TABLET, FILM COATED ORAL at 19:45

## 2018-11-09 RX ADMIN — INSULIN LISPRO 5 UNITS: 100 INJECTION, SOLUTION INTRAVENOUS; SUBCUTANEOUS at 17:54

## 2018-11-09 RX ADMIN — CEFEPIME HYDROCHLORIDE 2000 MG: 2 INJECTION, POWDER, FOR SOLUTION INTRAVENOUS at 21:19

## 2018-11-09 RX ADMIN — INSULIN LISPRO 1 UNITS: 100 INJECTION, SOLUTION INTRAVENOUS; SUBCUTANEOUS at 21:20

## 2018-11-09 RX ADMIN — ISODIUM CHLORIDE 3 ML: 0.03 SOLUTION RESPIRATORY (INHALATION) at 19:00

## 2018-11-09 RX ADMIN — LEVALBUTEROL HYDROCHLORIDE 1.25 MG: 1.25 SOLUTION, CONCENTRATE RESPIRATORY (INHALATION) at 13:27

## 2018-11-09 RX ADMIN — DOCUSATE SODIUM 100 MG: 100 CAPSULE, LIQUID FILLED ORAL at 17:54

## 2018-11-09 RX ADMIN — ALLOPURINOL 300 MG: 100 TABLET ORAL at 09:42

## 2018-11-09 RX ADMIN — LEVALBUTEROL HYDROCHLORIDE 1.25 MG: 1.25 SOLUTION, CONCENTRATE RESPIRATORY (INHALATION) at 07:38

## 2018-11-09 RX ADMIN — METOPROLOL TARTRATE 50 MG: 50 TABLET, FILM COATED ORAL at 09:42

## 2018-11-09 RX ADMIN — INSULIN LISPRO 1 UNITS: 100 INJECTION, SOLUTION INTRAVENOUS; SUBCUTANEOUS at 09:41

## 2018-11-09 RX ADMIN — ASPIRIN 81 MG: 81 TABLET, COATED ORAL at 09:42

## 2018-11-09 RX ADMIN — FUROSEMIDE 40 MG: 10 INJECTION, SOLUTION INTRAMUSCULAR; INTRAVENOUS at 09:42

## 2018-11-09 RX ADMIN — APIXABAN 5 MG: 5 TABLET, FILM COATED ORAL at 17:54

## 2018-11-09 RX ADMIN — ISODIUM CHLORIDE 3 ML: 0.03 SOLUTION RESPIRATORY (INHALATION) at 07:38

## 2018-11-09 RX ADMIN — INSULIN LISPRO 2 UNITS: 100 INJECTION, SOLUTION INTRAVENOUS; SUBCUTANEOUS at 13:58

## 2018-11-09 RX ADMIN — METOPROLOL TARTRATE 50 MG: 50 TABLET, FILM COATED ORAL at 21:20

## 2018-11-09 RX ADMIN — VITAMIN D, TAB 1000IU (100/BT) 1000 UNITS: 25 TAB at 09:42

## 2018-11-09 RX ADMIN — LEVALBUTEROL HYDROCHLORIDE 1.25 MG: 1.25 SOLUTION, CONCENTRATE RESPIRATORY (INHALATION) at 19:00

## 2018-11-09 RX ADMIN — TAMSULOSIN HYDROCHLORIDE 0.4 MG: 0.4 CAPSULE ORAL at 17:54

## 2018-11-09 RX ADMIN — FLUTICASONE PROPIONATE 1 SPRAY: 50 SPRAY, METERED NASAL at 09:46

## 2018-11-09 RX ADMIN — ISODIUM CHLORIDE 3 ML: 0.03 SOLUTION RESPIRATORY (INHALATION) at 13:27

## 2018-11-09 RX ADMIN — HEPARIN SODIUM 5000 UNITS: 5000 INJECTION INTRAVENOUS; SUBCUTANEOUS at 05:28

## 2018-11-09 RX ADMIN — SENNOSIDES 17.2 MG: 8.6 TABLET, FILM COATED ORAL at 21:20

## 2018-11-09 RX ADMIN — DOCUSATE SODIUM 100 MG: 100 CAPSULE, LIQUID FILLED ORAL at 09:42

## 2018-11-09 RX ADMIN — FUROSEMIDE 40 MG: 10 INJECTION, SOLUTION INTRAMUSCULAR; INTRAVENOUS at 17:54

## 2018-11-09 NOTE — PROGRESS NOTES
Progress Note - Julius Meo 1955, 70 y o  male MRN: [id number]    Unit/Bed#: E4 -01 Encounter: [id number]    Primary Care Provider: Sammie Sosa DO   Date and time admitted to hospital: 2018 12:10 PM        Paroxysmal atrial fibrillation [facility])   Assessment & Plan    Cards adjusting meds for RVR     Acute kidney injury [facility])   Assessment & Plan    Nearly resolved  Appreciate renal help     * Complete heart block ([address] )   Assessment & Plan    Received pacer  Now tachycardic with afib  Defer to cards           Subjective:   Doing better  Less sob  Some weakness with walking      Objective:     Vitals:   Temp (24hrs), Av 3 °F (36 8 °C), Min:97 8 °F (36 6 °C), Max:99 2 °F (37 3 °C)    Temp:  [97 8 °F (36 6 °C)-99 2 °F (37 3 °C)] 98 °F (36 7 °C)  HR:  [] 102  Resp:  [18-20] 20  BP: (117-152)/(69-83) 117/83  SpO2:  [90 %-100 %] 94 %  Body mass index is 35 78 kg/m²  Input and Output Summary (last 24 hours): Intake/Output Summary (Last 24 hours) at 18 1735  Last data filed at 18 1544   Gross per 24 hour   Intake              420 ml   Output             2575 ml   Net            -2155 ml       Physical Exam:     Physical Exam   HENT:   Head: Normocephalic and atraumatic  Eyes: Pupils are equal, round, and reactive to light  EOM are normal    Cardiovascular: Normal rate and regular rhythm  Exam reveals no gallop and no friction rub  No murmur heard  Pulmonary/Chest: Effort normal and breath sounds normal  He has no wheezes  He has no rales  Abdominal: Soft  There is no tenderness  Musculoskeletal: He exhibits no edema  Nursing note and vitals reviewed              Additional Data:     Labs:      Results from last 7 days  Lab Units 18  0356   WBC Thousand/uL 10 84*   HEMOGLOBIN g/dL 8 6*   HEMATOCRIT % 26 3*   PLATELETS Thousands/uL 341   NEUTROS PCT % 67   LYMPHS PCT % 13*   MONOS PCT % 9   EOS PCT % 8*       Results from last 7 days  Lab Units 11/09/18  0357   POTASSIUM mmol/L 4 2   CHLORIDE mmol/L 102   CO2 mmol/L 23   BUN mg/dL 34*   CREATININE mg/dL 1 27   CALCIUM mg/dL 8 7           Results from last 7 days  Lab Units 11/09/18  1503 11/09/18  1131 11/09/18  0735 11/08/18  2117 11/08/18  1616 11/08/18  1200 11/07/18  2100 11/07/18  1613 11/07/18  1148 11/07/18  0754 11/06/18  2117 11/06/18  1652   POC GLUCOSE mg/dl 311* 276* 166* 200* 260* 248* 189* 170* 282* 180* 238* 193*               * I Have Reviewed All Lab Data     Recent Cultures (last 7 days):             Last 24 Hours Medication List:     Current Facility-Administered Medications:  acetaminophen 650 mg Rectal Q4H PRN Farris Failing Spatzer, CRNP    allopurinol 300 mg Oral Daily PAT Lynch    apixaban 5 mg Oral BID Bean Hinds DO    cefepime 2,000 mg Intravenous Q12H PAT Chan Last Rate: 2,000 mg (11/09/18 0946)   cholecalciferol 1,000 Units Oral Daily PAT Olsen    diltiazem 10 mg Intravenous Q6H PRN PAT Olsen    docusate sodium 100 mg Oral BID PAT Olsen    fluticasone 1 spray Each Nare Daily Azael Griffin,     furosemide 40 mg Intravenous BID (diuretic) Beatriz Suarez DO    insulin lispro 1-6 Units Subcutaneous 4x Daily (AC & HS) Tracee Corbin MD    levalbuterol 1 25 mg Nebulization TID PAT Chan    metoprolol tartrate 50 mg Oral Q12H [address] Tommy Oden MD    pravastatin 80 mg Oral Daily With PAT Daniels    senna 2 tablet Oral HS PAT Olsen    sodium chloride 3 mL Nebulization TID Tracee Corbin MD    tamsulosin 0 4 mg Oral Daily With Dinner PAT Clemons          VTE Pharmacologic Prophylaxis:   Pharmacologic: Apixaban (Eliquis)      Current Length of Stay: 8 day(s)    Current Patient Status: Inpatient       Discharge Plan: home early this week    Code Status: Level 1 - Full Code           Today, Patient Was Seen By: Katia Arnold DO    ** Please Note: Dictation voice to text software may have been used in the creation of this document   **

## 2018-11-09 NOTE — PLAN OF CARE
CARDIOVASCULAR - ADULT     Maintains optimal cardiac output and hemodynamic stability Progressing     Absence of cardiac dysrhythmias or at baseline rhythm Progressing        DISCHARGE PLANNING - CARE MANAGEMENT     Discharge to post-acute care or home with appropriate resources Progressing        GENITOURINARY - ADULT     Maintains or returns to baseline urinary function Progressing     Absence of urinary retention Progressing     Urinary catheter remains patent Progressing        METABOLIC, FLUID AND ELECTROLYTES - ADULT     Electrolytes maintained within normal limits Progressing     Glucose maintained within target range Progressing        MUSCULOSKELETAL - ADULT     Maintain or return mobility to safest level of function Progressing        NEUROSENSORY - ADULT     Achieves stable or improved neurological status Progressing     Absence of seizures Progressing     Remains free of injury related to seizures activity Progressing     Achieves maximal functionality and self care Progressing        Nutrition/Hydration-ADULT     Nutrient/Hydration intake appropriate for improving, restoring or maintaining nutritional needs Progressing        Potential for Falls     Patient will remain free of falls Progressing        Prexisting or High Potential for Compromised Skin Integrity     Skin integrity is maintained or improved Progressing        RESPIRATORY - ADULT     Achieves optimal ventilation and oxygenation Progressing        SKIN/TISSUE INTEGRITY - ADULT     Incision(s), wounds(s) or drain site(s) healing without S/S of infection Progressing

## 2018-11-09 NOTE — PROGRESS NOTES
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 longstanding type 2 diabetes mellitus  5   Urinary retention    PLAN:    -creatinine now back to baseline  -patient known to me as an outpatient was on metoprolol 100 mg 2 times daily, losartan 100 mg daily, furosemide 40 mg daily as needed, and chlorthalidone 25 mg daily along with amlodipine 10 mg daily  -now off nicardipine drip  -blood pressures are better with rate controlling medication  -continue metoprolol 50 mg twice daily and furosemide 40 mg twice daily intravenous-edema much improved and weight improving while also maintaining hemodynamic stability so would continue current dose and rate of diuresis  -can likely transition to oral diuretics tomorrow  -can restart an ARB if blood pressure increases creatinine has improved  -serologic workup was done for his proteinuria as an outpatient and was negative, creatinine was stable with a history of diabetes will monitor and treat conservatively    -void trial after patient ambulating and sitting up bed  -hemoglobin stable   -now with Jones catheter Urology following and will determine when can be removed/voiding trial    SUBJECTIVE:    Patient seen today feeling better blood pressures better weight improving still requiring oxygen denies any chest pain or shortness of Breath    OBJECTIVE:  Current Weight: Weight - Scale: 113 kg (249 lb 5 4 oz)  Vitals:    11/09/18 0738   BP: 147/74 Pulse: 104   Resp: 20   Temp: 98 1 °F (36 7 °C)   SpO2: 92%       Intake/Output Summary (Last 24 hours) at 11/09/18 1147  Last data filed at 11/09/18 0900   Gross per 24 hour   Intake              420 ml   Output             1875 ml   Net            -1455 ml     Weight (last 2 days)     Date/Time   Weight    11/09/18 0548  113 (249 34)    11/08/18 0600  116 (255 29)    11/07/18 1810  117 (257 5)    11/07/18 0540  112 (247 8)            Weight change: -3 7 kg (-8 lb 2 5 oz)    General: conscious, cooperative, in not acute distress  Eyes: conjunctivae pink, anicteric sclerae  ENT: lips and mucous membranes moist  Neck: supple, no JVD  Chest: clear breath sounds bilateral, no crackles, ronchus or wheezings  CVS: distinct S1 & S2, normal rate, regular rhythm  Abdomen: soft, non-tender, non-distended, normoactive bowel sounds  Extremities:  Trace edema  Skin: no rash  Neuro: awake, alert, oriented    Medications:    Current Facility-Administered Medications:     acetaminophen (TYLENOL) rectal suppository 650 mg, 650 mg, Rectal, Q4H PRN, Chick Hammers Spatzer, CRNP, 650 mg at 11/02/18 0507    allopurinol (ZYLOPRIM) tablet 300 mg, 300 mg, Oral, Daily, PAT Berger Se, 300 mg at 11/09/18 0942    aspirin (ECOTRIN LOW STRENGTH) EC tablet 81 mg, 81 mg, Oral, Daily, PAT Berger Se, 81 mg at 11/09/18 0942    cefepime (MAXIPIME) 2,000 mg in dextrose 5 % 50 mL IVPB, 2,000 mg, Intravenous, Q12H, PAT Chan, Last Rate: 100 mL/hr at 11/09/18 0946, 2,000 mg at 11/09/18 0946    cholecalciferol (VITAMIN D3) tablet 1,000 Units, 1,000 Units, Oral, Daily, PAT Berger Se, 1,000 Units at 11/09/18 0942    diltiazem (CARDIZEM) injection 10 mg, 10 mg, Intravenous, Q6H PRN, PAT Berger Se, 10 mg at 11/08/18 0453    docusate sodium (COLACE) capsule 100 mg, 100 mg, Oral, BID, PAT Olsen, 100 mg at 11/09/18 0942    fluticasone (FLONASE) 50 mcg/act nasal spray 1 spray, 1 spray, Each Nare, Daily, Azael Griffin, , 1 spray at 11/09/18 0946    furosemide (LASIX) injection 40 mg, 40 mg, Intravenous, BID (diuretic), Eladio Patton, , 40 mg at 11/09/18 0942    heparin (porcine) subcutaneous injection 5,000 Units, 5,000 Units, Subcutaneous, Q8H [facility], 5,000 Units at 11/09/18 0528 **AND** Platelet count, , , Once, PAT Olsen    insulin lispro (HumaLOG) 100 units/mL subcutaneous injection 1-6 Units, 1-6 Units, Subcutaneous, 4x Daily (AC & HS), 1 Units at 11/09/18 0941 **AND** Fingerstick Glucose (POCT), , , 4x Daily AC and at bedtime, Rohit Mejia MD    [facility]) inhalation solution 1 25 mg, 1 25 mg, Nebulization, TID, PAT Nagel, 1 25 mg at 11/09/18 0738    metoprolol tartrate (LOPRESSOR) tablet 50 mg, 50 mg, Oral, Q12H [facility], Mj Sherwood MD, 50 mg at 11/09/18 0942    pravastatin (PRAVACHOL) tablet 80 mg, 80 mg, Oral, Daily With Dinner, PAT Olsen, 80 mg at 11/08/18 1702    senna (SENOKOT) tablet 17 2 mg, 2 tablet, Oral, HS, PAT Olsen, 17 2 mg at 11/08/18 2131    sodium chloride 0 9 % inhalation solution 3 mL, 3 mL, Nebulization, TID, Rohit Mejia MD, 3 mL at 11/09/18 0738    tamsulosin (FLOMAX) capsule 0 4 mg, 0 4 mg, Oral, Daily With Arman PAT Cardenas, 0 4 mg at 11/08/18 1701     Lab Results:     Results from last 7 days  Lab Units 11/09/18  0357 11/09/18  0356 11/08/18  0643 11/07/18  1009 11/07/18  0453 11/06/18  0445 11/05/18  0457 11/04/18  0433   WBC Thousand/uL  --  10 84*  --   --  10 15 11 32* 15 67* 16 75*   HEMOGLOBIN g/dL  --  8 6*  --   --  8 5* 9 1* 9 1* 9 0*   HEMATOCRIT %  --  26 3*  --   --  25 9* 27 7* 27 9* 27 7*   PLATELETS Thousands/uL  --  341  --   --  272 286 255 215   POTASSIUM mmol/L 4 2  --  4 1 4 0  --   --  3 9 4 4   CHLORIDE mmol/L 102  --  102 102  --   --  104 106   CO2 mmol/L 23  --  23 24  --   --  23 23   BUN mg/dL 34*  --  35* 37*  --   --  37* 34*   CREATININE mg/dL 1 27  --  1 20 1 26  --   -- 1  24 1 30   CALCIUM mg/dL 8 7  --  8 4 8 5  --   --  8 4 8 3   MAGNESIUM mg/dL 1 8  --   --  2 0  --   --  2 1 2 0       Previous work up:  Please see previous notes

## 2018-11-09 NOTE — NURSING NOTE
Jones catheter removed at 0630, per the request of previous RN from [facility], as pt was started on Flomax  Due to void at 1430

## 2018-11-09 NOTE — PROGRESS NOTES
Progress Note - Cardiology   Anne Marie Bhandari 70 y o  male MRN: [id number]  Unit/Bed#: E4 -01 Encounter: [id number]      Assessment/Recommendations/Discussion:   1  Complete with shock, status post permanent pacemaker  2  New onset atrial fibrillation, adequately rate controlled  3  Acute diastolic congestive heart failure with volume overload  4  Diabetes  5  Benign essential hypertension  6  Acute kidney injury on chronic kidney disease, improved  7  Dyslipidemia  8  Mild aortic valve stenosis  9  Mild mitral valve stenosis secondary to annular calcification    · Continue diuresis, volume status significantly improved  Transition to oral furosemide in 1-2 days  · Will start Eliquis 5 mg p  O  B i d   ·  May potentially increase metoprolol back to 100 mg p o  B i d  once on p o   Furosemide for slightly better rate control      Subjective:  Patient seen and examined, feels well, no complaints      Physical Exam:  GEN:  NAD  HEENT:  MMM, NCAT, pink conjunctiva, EOMI, nonicteric sclera  CV:  NO JVD/HJR, irregular rhythm, NO M/R/G, +S1/S2, NO PARASTERNAL HEAVE/THRILL, NO LE EDEMA, NO HEPATIC SYSTOLIC PULSATION, WARM EXTREMITIES  RESP:  CTAB/L  ABD:  SOFT, NT, NO GROSS ORGANOMEGALY        Vitals:   /74 (BP Location: Left arm)   Pulse 104   Temp 98 1 °F (36 7 °C) (Tympanic)   Resp 20   Ht 5' 10" (1 778 m)   Wt 113 kg (249 lb 5 4 oz)   SpO2 92%   BMI 35 78 kg/m²   Vitals:    11/08/18 0600 11/09/18 0548   Weight: 116 kg (255 lb 4 7 oz) 113 kg (249 lb 5 4 oz)       Intake/Output Summary (Last 24 hours) at 11/09/18 1307  Last data filed at 11/09/18 0900   Gross per 24 hour   Intake              420 ml   Output             1875 ml   Net            -1455 ml       TELEMETRY:  Atrial fibrillation, marginally elevated heart rate  Lab Results:    Results from last 7 days  Lab Units 11/09/18  0356   WBC Thousand/uL 10 84*   HEMOGLOBIN g/dL 8 6*   HEMATOCRIT % 26 3*   PLATELETS Thousands/uL 341       Results from last 7 days  Lab Units 11/09/18  0357   POTASSIUM mmol/L 4 2   CHLORIDE mmol/L 102   CO2 mmol/L 23   BUN mg/dL 34*   CREATININE mg/dL 1 27   CALCIUM mg/dL 8 7       Results from last 7 days  Lab Units 11/09/18  0357   POTASSIUM mmol/L 4 2   CHLORIDE mmol/L 102   CO2 mmol/L 23   BUN mg/dL 34*   CREATININE mg/dL 1 27   CALCIUM mg/dL 8 7           Medications:    Current Facility-Administered Medications:     acetaminophen (TYLENOL) rectal suppository 650 mg, 650 mg, Rectal, Q4H PRN, Delrae Clas Spatzer, CRNP, 650 mg at 11/02/18 0507    allopurinol (ZYLOPRIM) tablet 300 mg, 300 mg, Oral, Daily, PAT Carrillo, 300 mg at 11/09/18 0942    aspirin (ECOTRIN LOW STRENGTH) EC tablet 81 mg, 81 mg, Oral, Daily, PAT Carrillo, 81 mg at 11/09/18 0942    cefepime (MAXIPIME) 2,000 mg in dextrose 5 % 50 mL IVPB, 2,000 mg, Intravenous, Q12H, PAT Chan, Last Rate: 100 mL/hr at 11/09/18 0946, 2,000 mg at 11/09/18 0946    cholecalciferol (VITAMIN D3) tablet 1,000 Units, 1,000 Units, Oral, Daily, PAT Carrillo, 1,000 Units at 11/09/18 0942    diltiazem (CARDIZEM) injection 10 mg, 10 mg, Intravenous, Q6H PRN, PAT Carrillo, 10 mg at 11/08/18 0453    docusate sodium (COLACE) capsule 100 mg, 100 mg, Oral, BID, PAT Olsen, 100 mg at 11/09/18 0942    fluticasone (FLONASE) 50 mcg/act nasal spray 1 spray, 1 spray, Each Nare, Daily, Azael Griffin DO, 1 spray at 11/09/18 0946    furosemide (LASIX) injection 40 mg, 40 mg, Intravenous, BID (diuretic), Eladio Patton, DO, 40 mg at 11/09/18 0942    heparin (porcine) subcutaneous injection 5,000 Units, 5,000 Units, Subcutaneous, Q8H [address], 5,000 Units at 11/09/18 0528 **AND** Platelet count, , , Once, PAT Olsen    insulin lispro (HumaLOG) 100 units/mL subcutaneous injection 1-6 Units, 1-6 Units, Subcutaneous, 4x Daily (AC & HS), 1 Units at 11/09/18 0941 **AND** Fingerstick Glucose (POCT), , , 4x Daily AC and at bedtime, Sarina Kramer Jason Thrasher MD    levalbuterol [facility]) inhalation solution 1 25 mg, 1 25 mg, Nebulization, TID, PAT Stephens, 1 25 mg at 11/09/18 0738    metoprolol tartrate (LOPRESSOR) tablet 50 mg, 50 mg, Oral, Q12H [address], Kevan Leventhal, MD, 50 mg at 11/09/18 0942    pravastatin (PRAVACHOL) tablet 80 mg, 80 mg, Oral, Daily With PAT Dudley, 80 mg at 11/08/18 1702    senna (SENOKOT) tablet 17 2 mg, 2 tablet, Oral, HS, Genesis K PAT Ordonez, 17 2 mg at 11/08/18 2131    sodium chloride 0 9 % inhalation solution 3 mL, 3 mL, Nebulization, TID, Elva Vera MD, 3 mL at 11/09/18 0738    tamsulosin (FLOMAX) capsule 0 4 mg, 0 4 mg, Oral, Daily With PAT Dudley, 0 4 mg at 11/08/18 1701    This note was completed in part utilizing [employer] Direct Software  Grammatical errors, random word insertions, spelling mistakes, and incomplete sentences may be an occasional consequence of this system secondary to software limitations, ambient noise, and hardware issues  If you have any questions or concerns about the content, text, or information contained within the body of this dictation, please contact the provider for clarification

## 2018-11-09 NOTE — UTILIZATION REVIEW
Continued Stay Review    Date: 11/9/2018    Vital Signs: /69 (BP Location: Left arm)   Pulse 78   Temp 97 8 °F (36 6 °C) (Tympanic)   Resp 20   Ht 5' 10" (1 778 m)   Wt 113 kg (249 lb 5 4 oz)   SpO2 92%   BMI 35 78 kg/m²     Medications:   Scheduled Meds:   Current Facility-Administered Medications:          allopurinol 300 mg Oral Daily PAT Olsen    aspirin 81 mg Oral Daily PAT Montes    cefepime 2,000 mg Intravenous Q12H PAT Chan Last Rate: 2,000 mg (11/08/18 2129)   cholecalciferol 1,000 Units Oral Daily PAT Olsen            docusate sodium 100 mg Oral BID PAT Olsen    fluticasone 1 spray Each Nare Daily Azael Griffin,     furosemide 40 mg Intravenous BID (diuretic) Eladio Patton DO    heparin (porcine) 5,000 Units Subcutaneous Q8H [facility] PAT Olsen    insulin lispro 1-6 Units Subcutaneous 4x Daily (AC & HS) Haley Quesada MD    levalbuterol 1 25 mg Nebulization TID PAT Gramajo    metoprolol tartrate 50 mg Oral Q12H [facility] Carlos Justice MD    pravastatin 80 mg Oral Daily With PAT Souza    senna 2 tablet Oral HS PAT Olsen    sodium chloride 3 mL Nebulization TID Haley Quesada MD    tamsulosin 0 4 mg Oral Daily With Dinner PAT Montes      Continuous Infusions:    PRN Meds:   acetaminophen    Diltiazem IV x 1 11/8    Abnormal Labs/Diagnostic Results:   WBC's 10 84  H&H 8 6 / 26 3  Na 135  BUN 34  Glu 182      Age/Sex: 70 y o  male     Assessment/Plan:   Per Note from 11/8:   Acute kidney injury [facility])   Assessment & Plan     Nearly resolved         * Complete heart block (HCC)   Assessment & Plan     Received pacer  Now tachycardic with afib  Defer to cards     TELE  O2 @ 2 liters - sat 92%  Remains on IV Lasix and IV Cefepime  Some Orthostasis 11/8 with standing  Start Eliquis when OK with EPS in light of fresh pacemaker pocket    Discharge Plan:Anticipate Home once medically cleared   ? Royce Cruz

## 2018-11-10 LAB
ANION GAP SERPL CALCULATED.3IONS-SCNC: 12 MMOL/L (ref 4–13)
BUN SERPL-MCNC: 28 MG/DL (ref 5–25)
CALCIUM SERPL-MCNC: 8.7 MG/DL (ref 8.3–10.1)
CHLORIDE SERPL-SCNC: 102 MMOL/L (ref 100–108)
CO2 SERPL-SCNC: 22 MMOL/L (ref 21–32)
CREAT SERPL-MCNC: 1.13 MG/DL (ref 0.6–1.3)
GFR SERPL CREATININE-BSD FRML MDRD: 68 ML/MIN/1.73SQ M
GLUCOSE SERPL-MCNC: 167 MG/DL (ref 65–140)
GLUCOSE SERPL-MCNC: 195 MG/DL (ref 65–140)
GLUCOSE SERPL-MCNC: 218 MG/DL (ref 65–140)
GLUCOSE SERPL-MCNC: 234 MG/DL (ref 65–140)
GLUCOSE SERPL-MCNC: 305 MG/DL (ref 65–140)
POTASSIUM SERPL-SCNC: 3.9 MMOL/L (ref 3.5–5.3)
SODIUM SERPL-SCNC: 136 MMOL/L (ref 136–145)

## 2018-11-10 PROCEDURE — 94640 AIRWAY INHALATION TREATMENT: CPT

## 2018-11-10 PROCEDURE — 80048 BASIC METABOLIC PNL TOTAL CA: CPT | Performed by: PHYSICIAN ASSISTANT

## 2018-11-10 PROCEDURE — 94660 CPAP INITIATION&MGMT: CPT

## 2018-11-10 PROCEDURE — 94760 N-INVAS EAR/PLS OXIMETRY 1: CPT

## 2018-11-10 PROCEDURE — 99232 SBSQ HOSP IP/OBS MODERATE 35: CPT | Performed by: INTERNAL MEDICINE

## 2018-11-10 PROCEDURE — 99232 SBSQ HOSP IP/OBS MODERATE 35: CPT | Performed by: HOSPITALIST

## 2018-11-10 PROCEDURE — 82948 REAGENT STRIP/BLOOD GLUCOSE: CPT

## 2018-11-10 RX ORDER — FUROSEMIDE 40 MG/1
40 TABLET ORAL DAILY
Status: DISCONTINUED | OUTPATIENT
Start: 2018-11-10 | End: 2018-11-10

## 2018-11-10 RX ORDER — FUROSEMIDE 40 MG/1
40 TABLET ORAL DAILY
Status: DISCONTINUED | OUTPATIENT
Start: 2018-11-11 | End: 2018-11-11

## 2018-11-10 RX ADMIN — LEVALBUTEROL HYDROCHLORIDE 1.25 MG: 1.25 SOLUTION, CONCENTRATE RESPIRATORY (INHALATION) at 19:29

## 2018-11-10 RX ADMIN — ACETAMINOPHEN 650 MG: 325 TABLET, FILM COATED ORAL at 18:28

## 2018-11-10 RX ADMIN — INSULIN LISPRO 2 UNITS: 100 INJECTION, SOLUTION INTRAVENOUS; SUBCUTANEOUS at 18:27

## 2018-11-10 RX ADMIN — INSULIN LISPRO 3 UNITS: 100 INJECTION, SOLUTION INTRAVENOUS; SUBCUTANEOUS at 21:14

## 2018-11-10 RX ADMIN — APIXABAN 5 MG: 5 TABLET, FILM COATED ORAL at 09:46

## 2018-11-10 RX ADMIN — ISODIUM CHLORIDE 3 ML: 0.03 SOLUTION RESPIRATORY (INHALATION) at 07:30

## 2018-11-10 RX ADMIN — APIXABAN 5 MG: 5 TABLET, FILM COATED ORAL at 18:27

## 2018-11-10 RX ADMIN — LEVALBUTEROL HYDROCHLORIDE 1.25 MG: 1.25 SOLUTION, CONCENTRATE RESPIRATORY (INHALATION) at 13:05

## 2018-11-10 RX ADMIN — TAMSULOSIN HYDROCHLORIDE 0.4 MG: 0.4 CAPSULE ORAL at 18:27

## 2018-11-10 RX ADMIN — INSULIN LISPRO 1 UNITS: 100 INJECTION, SOLUTION INTRAVENOUS; SUBCUTANEOUS at 09:45

## 2018-11-10 RX ADMIN — LEVALBUTEROL HYDROCHLORIDE 1.25 MG: 1.25 SOLUTION, CONCENTRATE RESPIRATORY (INHALATION) at 07:30

## 2018-11-10 RX ADMIN — CEFEPIME HYDROCHLORIDE 2000 MG: 2 INJECTION, POWDER, FOR SOLUTION INTRAVENOUS at 19:57

## 2018-11-10 RX ADMIN — INSULIN LISPRO 4 UNITS: 100 INJECTION, SOLUTION INTRAVENOUS; SUBCUTANEOUS at 13:32

## 2018-11-10 RX ADMIN — ISODIUM CHLORIDE 3 ML: 0.03 SOLUTION RESPIRATORY (INHALATION) at 19:29

## 2018-11-10 RX ADMIN — CEFEPIME HYDROCHLORIDE 2000 MG: 2 INJECTION, POWDER, FOR SOLUTION INTRAVENOUS at 09:46

## 2018-11-10 RX ADMIN — DOCUSATE SODIUM 100 MG: 100 CAPSULE, LIQUID FILLED ORAL at 09:46

## 2018-11-10 RX ADMIN — METOPROLOL TARTRATE 50 MG: 50 TABLET, FILM COATED ORAL at 21:14

## 2018-11-10 RX ADMIN — ALLOPURINOL 300 MG: 100 TABLET ORAL at 09:46

## 2018-11-10 RX ADMIN — DOCUSATE SODIUM 100 MG: 100 CAPSULE, LIQUID FILLED ORAL at 18:27

## 2018-11-10 RX ADMIN — PRAVASTATIN SODIUM 80 MG: 80 TABLET ORAL at 18:27

## 2018-11-10 RX ADMIN — METOPROLOL TARTRATE 50 MG: 50 TABLET, FILM COATED ORAL at 09:46

## 2018-11-10 RX ADMIN — FUROSEMIDE 40 MG: 10 INJECTION, SOLUTION INTRAMUSCULAR; INTRAVENOUS at 09:45

## 2018-11-10 RX ADMIN — ISODIUM CHLORIDE 3 ML: 0.03 SOLUTION RESPIRATORY (INHALATION) at 13:05

## 2018-11-10 RX ADMIN — FLUTICASONE PROPIONATE 1 SPRAY: 50 SPRAY, METERED NASAL at 09:45

## 2018-11-10 RX ADMIN — VITAMIN D, TAB 1000IU (100/BT) 1000 UNITS: 25 TAB at 09:46

## 2018-11-10 RX ADMIN — FUROSEMIDE 40 MG: 10 INJECTION, SOLUTION INTRAMUSCULAR; INTRAVENOUS at 18:27

## 2018-11-10 NOTE — PROGRESS NOTES
Progress Note - Cardiology   Trevor English 70 y o  male MRN: [id number]  Unit/Bed#: E4 -01 Encounter: [id number]      Assessment/Recommendations/Discussion:   1  Complete with shock, status post permanent pacemaker  2  New onset atrial fibrillation, adequately rate controlled  3  Acute diastolic congestive heart failure with volume overload  4  Diabetes  5  Benign essential hypertension  6  Acute kidney injury on chronic kidney disease, improved  7  Dyslipidemia  8  Mild aortic valve stenosis  9  Mild mitral valve stenosis secondary to annular calcification    · Good diuresis with improved renal function  Will transition furosemide to 40 mg daily tomorrow  · Tolerating Eliquis well    Pocket appears okay without expansion  · Wean off oxygen  · Heart rate adequately controlled      Subjective:  Patient seen and examined, feels well      Physical Exam:  GEN:  NAD  HEENT:  MMM, NCAT, pink conjunctiva, EOMI, nonicteric sclera  CV:  NO JVD/HJR, irregularly irregular, NO M/R/G, +S1/S2, NO PARASTERNAL HEAVE/THRILL, NO LE EDEMA, NO HEPATIC SYSTOLIC PULSATION, WARM EXTREMITIES  RESP:  CTAB/L  ABD:  SOFT, NT, NO GROSS ORGANOMEGALY        Vitals:   /59 (BP Location: Right arm)   Pulse 74   Temp 98 3 °F (36 8 °C) (Tympanic)   Resp 20   Ht 5' 10" (1 778 m)   Wt 112 kg (246 lb 11 1 oz)   SpO2 95%   BMI 35 40 kg/m²   Vitals:    11/09/18 0548 11/10/18 0600   Weight: 113 kg (249 lb 5 4 oz) 112 kg (246 lb 11 1 oz)       Intake/Output Summary (Last 24 hours) at 11/10/18 1105  Last data filed at 11/10/18 0900   Gross per 24 hour   Intake              521 ml   Output             1150 ml   Net             -629 ml       TELEMETRY:  Atrial fibrillation, rate controlled  Lab Results:    Results from last 7 days  Lab Units 11/09/18  0356   WBC Thousand/uL 10 84*   HEMOGLOBIN g/dL 8 6*   HEMATOCRIT % 26 3*   PLATELETS Thousands/uL 341       Results from last 7 days  Lab Units 11/10/18  0836   POTASSIUM mmol/L 3 9 CHLORIDE mmol/L 102   CO2 mmol/L 22   BUN mg/dL 28*   CREATININE mg/dL 1 13   CALCIUM mg/dL 8 7       Results from last 7 days  Lab Units 11/10/18  0836   POTASSIUM mmol/L 3 9   CHLORIDE mmol/L 102   CO2 mmol/L 22   BUN mg/dL 28*   CREATININE mg/dL 1 13   CALCIUM mg/dL 8 7           Medications:    Current Facility-Administered Medications:     acetaminophen (TYLENOL) tablet 650 mg, 650 mg, Oral, 4x Daily PRN, PAT Juares, 650 mg at 11/09/18 1945    allopurinol (ZYLOPRIM) tablet 300 mg, 300 mg, Oral, Daily, [facility] PAT Ordonez, 300 mg at 11/10/18 0946    apixaban (ELIQUIS) tablet 5 mg, 5 mg, Oral, BID, Rujul Hinds, DO, 5 mg at 11/10/18 0946    cefepime (MAXIPIME) 2,000 mg in dextrose 5 % 50 mL IVPB, 2,000 mg, Intravenous, Q12H, PAT Chan, Last Rate: 100 mL/hr at 11/10/18 0946, 2,000 mg at 11/10/18 0946    cholecalciferol (VITAMIN D3) tablet 1,000 Units, 1,000 Units, Oral, Daily, Sherren Kinds, CRNP, 1,000 Units at 11/10/18 0946    diltiazem (CARDIZEM) injection 10 mg, 10 mg, Intravenous, Q6H PRN, [facility] PAT Ordonez, 10 mg at 11/08/18 0453    docusate sodium (COLACE) capsule 100 mg, 100 mg, Oral, BID, [facility] PAT Ordonez, 100 mg at 11/10/18 0946    fluticasone (FLONASE) 50 mcg/act nasal spray 1 spray, 1 spray, Each Nare, Daily, Azael [facility] DO, 1 spray at 11/10/18 0945    furosemide (LASIX) injection 40 mg, 40 mg, Intravenous, BID (diuretic), Eladio Patton, DO, 40 mg at 11/10/18 0945    insulin lispro (HumaLOG) 100 units/mL subcutaneous injection 1-6 Units, 1-6 Units, Subcutaneous, 4x Daily (AC & HS), 1 Units at 11/10/18 0945 **AND** Fingerstick Glucose (POCT), , , 4x Daily AC and at bedtime, Marya Sarah MD    [facility]) inhalation solution 1 25 mg, 1 25 mg, Nebulization, TID, PAT Iniguez, 1 25 mg at 11/10/18 0730    metoprolol tartrate (LOPRESSOR) tablet 50 mg, 50 mg, Oral, Q12H [address], Janie Brock MD, 50 mg at 11/10/18 0946    pravastatin (PRAVACHOL) tablet 80 mg, 80 mg, Oral, Daily With Wilson Kalata, PAT, 80 mg at 11/09/18 1754    senna (SENOKOT) tablet 17 2 mg, 2 tablet, Oral, HS, PAT Olsen, 17 2 mg at 11/09/18 2120    sodium chloride 0 9 % inhalation solution 3 mL, 3 mL, Nebulization, TID, Shahida Kaufman MD, 3 mL at 11/10/18 0730    tamsulosin (FLOMAX) capsule 0 4 mg, 0 4 mg, Oral, Daily With Dinner, PAT Abarca, 0 4 mg at 11/09/18 1754    This note was completed in part utilizing [employer] Direct Software  Grammatical errors, random word insertions, spelling mistakes, and incomplete sentences may be an occasional consequence of this system secondary to software limitations, ambient noise, and hardware issues  If you have any questions or concerns about the content, text, or information contained within the body of this dictation, please contact the provider for clarification

## 2018-11-10 NOTE — PROGRESS NOTES
NEPHROLOGY PROGRESS NOTE   Rohit Kaufman 70 y o  male MRN: [id number]  Unit/Bed#: E4 -01 Encounter: [id number]      ASSESSMENT/PLAN:  1  Acute kidney injury (POA): ATN from decreased cardiac output in the setting of complete heart block, ARB and diuretics    -creatinine peaked at 3 and then started to improve    -ELZBIETA now resolved and creatinine stable around 1 1-1 2    -continue to hold losartan since blood pressure is currently acceptable but this will need to be restarted eventually due to his proteinuria (perhaps at a lower dose depending blood pressure is on discharge)  2  CKD III: baseline creatinine 1 1-1 2 and follows with Dr Elaina Jo   3  Acute Diastolic CHF:  On IV Lasix  Discussed with Cardiology and will likely be transitioning to oral diuretics today (will defer dosing to Cardiology since creatinine is at baseline)  4  Complete heart block:  Status post permanent pacemaker  5  Hypertension: BP acceptable    -his home losartan is still on hold and could restart this if his blood pressure elevated  6  Proteinuria:  Patient had 3 1 g and in reviewing his old records it seems that he consistently has nephrotic range proteinuria which is felt to be due to diabetes   -will need ARB restarted on discharge        SUBJECTIVE:  Patient is feeling well today  He denies any chest pain, shortness of breath, nausea, vomiting or diarrhea      OBJECTIVE:  Current Weight: Weight - Scale: 112 kg (246 lb 11 1 oz)  Vitals:    11/10/18 0730   BP:    Pulse:    Resp:    Temp:    SpO2: 95%       Intake/Output Summary (Last 24 hours) at 11/10/18 0955  Last data filed at 11/10/18 0900   Gross per 24 hour   Intake              521 ml   Output             1150 ml   Net             -629 ml     General:  No acute distress  Skin:  No rash  Eyes:  Sclerae anicteric  ENT:  Moist mucous membranes  Neck:  Supple, symmetric  Chest:  Clear to auscultation bilaterally   CVS:  Regular rate and rhythm  Abdomen:  Soft, nontender, nondistended  Extremities:  Bilateral pedal edema  Neuro:  Awake and alert  Psych:  Appropriate affect     Medications:  Scheduled Meds:  Current Facility-Administered Medications:  acetaminophen 650 mg Oral 4x Daily PRN PAT Cardenas    allopurinol 300 mg Oral Daily EverPAT Garcia    apixaban 5 mg Oral BID Bean Hinds DO    cefepime 2,000 mg Intravenous Q12H PAT Chan Last Rate: 2,000 mg (11/10/18 0946)   cholecalciferol 1,000 Units Oral Daily PAT Olsen    diltiazem 10 mg Intravenous Q6H PRN PAT Corcoran    docusate sodium 100 mg Oral BID PAT Olsen    fluticasone 1 spray Each Nare Daily Azael Griffin,     furosemide 40 mg Intravenous BID (diuretic) Timo Marcelo,     insulin lispro 1-6 Units Subcutaneous 4x Daily (AC & HS) Juan F Forbes MD    levalbuterol 1 25 mg Nebulization TID [facility] PAT Altamirano    metoprolol tartrate 50 mg Oral Q12H [facility] Lamont Chen MD    pravastatin 80 mg Oral Daily With [place] PAT Mcgregor    senna 2 tablet Oral HS PAT Olsen    sodium chloride 3 mL Nebulization TID Juan F Forbes MD    tamsulosin 0 4 mg Oral Daily With Dinner PAT Vidal        PRN Meds:   acetaminophen    diltiazem    Laboratory Results:    Results from last 7 days  Lab Units 11/10/18  0836 11/09/18  0357 11/09/18  0356 11/08/18  0643 11/07/18  1009 11/07/18  0453 11/06/18  0445 11/05/18  0457   WBC Thousand/uL  --   --  10 84*  --   --  10 15 11 32* 15 67*   HEMOGLOBIN g/dL  --   --  8 6*  --   --  8 5* 9 1* 9 1*   HEMATOCRIT %  --   --  26 3*  --   --  25 9* 27 7* 27 9*   PLATELETS Thousands/uL  --   --  341  --   --  272 286 255   POTASSIUM mmol/L 3 9 4 2  --  4 1 4 0  --   --  3 9   CHLORIDE mmol/L 102 102  --  102 102  --   --  104   CO2 mmol/L 22 23  --  23 24  --   --  23   BUN mg/dL 28* 34*  --  35* 37*  --   --  37*   CREATININE mg/dL 1 13 1 27  --  1 20 1 26  --   --  1 24   CALCIUM mg/dL 8 7 8 7  --  8 4 8 5  --   -- 8 4   MAGNESIUM mg/dL  --  1 8  --   --  2 0  --   --  2 1

## 2018-11-10 NOTE — PROGRESS NOTES
Progress Note - Shane Welch 1955, 70 y o  male MRN: [id number]    Unit/Bed#: E4 -01 Encounter: [id number]    Primary Care Provider: Marizol Johnson DO   Date and time admitted to hospital: 2018 12:10 PM        Paroxysmal atrial fibrillation ([facility] [address] )   Assessment & Plan    Cards adjusting meds for RVR     Sepsis [facility])   Assessment & Plan    Completed course of abx       * Complete heart block ([facility] [address] )   Assessment & Plan    Received pacer  Now tachycardic with afib  Defer to cards           Subjective:   Doing well  Less sob  No chest pain      Objective:     Vitals:   Temp (24hrs), Av 8 °F (36 6 °C), Min:97 2 °F (36 2 °C), Max:98 3 °F (36 8 °C)    Temp:  [97 2 °F (36 2 °C)-98 3 °F (36 8 °C)] 98 °F (36 7 °C)  HR:  [] 69  Resp:  [20] 20  BP: (117-151)/(59-83) 151/70  SpO2:  [94 %-99 %] 95 %  Body mass index is 35 4 kg/m²  Input and Output Summary (last 24 hours): Intake/Output Summary (Last 24 hours) at 11/10/18 1405  Last data filed at 11/10/18 1324   Gross per 24 hour   Intake             1001 ml   Output             2600 ml   Net            -1599 ml       Physical Exam:     Physical Exam   HENT:   Head: Normocephalic and atraumatic  Eyes: Pupils are equal, round, and reactive to light  EOM are normal    Cardiovascular: Normal rate and regular rhythm  Exam reveals no gallop and no friction rub  No murmur heard  Pulmonary/Chest: Effort normal and breath sounds normal  He has no wheezes  He has no rales  Abdominal: Soft  Bowel sounds are normal  There is no tenderness  Musculoskeletal: He exhibits no edema  Nursing note and vitals reviewed              Additional Data:     Labs:      Results from last 7 days  Lab Units 18  0356   WBC Thousand/uL 10 84*   HEMOGLOBIN g/dL 8 6*   HEMATOCRIT % 26 3*   PLATELETS Thousands/uL 341   NEUTROS PCT % 67   LYMPHS PCT % 13*   MONOS PCT % 9   EOS PCT % 8*       Results from last 7 days  Lab Units 11/10/18  0836   POTASSIUM mmol/L 3 9   CHLORIDE mmol/L 102   CO2 mmol/L 22   BUN mg/dL 28*   CREATININE mg/dL 1 13   CALCIUM mg/dL 8 7           Results from last 7 days  Lab Units 11/10/18  1119 11/10/18  0749 11/09/18  2052 11/09/18  1503 11/09/18  1131 11/09/18  0735 11/08/18  2117 11/08/18  1616 11/08/18  1200 11/07/18  2100 11/07/18  1613 11/07/18  1148   POC GLUCOSE mg/dl 305* 167* 169* 311* 276* 166* 200* 260* 248* 189* 170* 282*               * I Have Reviewed All Lab Data     Recent Cultures (last 7 days):             Last 24 Hours Medication List:     Current Facility-Administered Medications:  acetaminophen 650 mg Oral 4x Daily PRN PAT Nichols    allopurinol 300 mg Oral Daily Margarite FuadPAT Loomis    apixaban 5 mg Oral BID Bean Hinds DO    cefepime 2,000 mg Intravenous Q12H PAT hCan Last Rate: 2,000 mg (11/10/18 0946)   cholecalciferol 1,000 Units Oral Daily PAT Olsen    diltiazem 10 mg Intravenous Q6H PRN PAT Olsen    docusate sodium 100 mg Oral BID Genesis K PAT Ordonez    fluticasone 1 spray Each Nare Daily Azael PrechtDO laura    furosemide 40 mg Intravenous BID (diuretic) Sravanthil Guzman DO    [START ON 11/11/2018] furosemide 40 mg Oral Daily Sravanthil DO Guzman    insulin lispro 1-6 Units Subcutaneous 4x Daily (AC & HS) Davin Garcia MD    levalbuterol 1 25 mg Nebulization TID PAT Chan    metoprolol tartrate 50 mg Oral Q12H [facility] Arleth Bermudez MD    pravastatin 80 mg Oral Daily With PAT Daniels    senna 2 tablet Oral HS PAT Olsen    sodium chloride 3 mL Nebulization TID Davin Garcia MD    tamsulosin 0 4 mg Oral Daily With Dinner Clemencia Vahid, CRNP          VTE Pharmacologic Prophylaxis:   Pharmacologic: Apixaban (Eliquis)      Current Length of Stay: 9 day(s)    Current Patient Status: Inpatient       Discharge Plan: home Monday    Code Status: Level 1 - Full Code           Today, Patient Was Seen By: Nica Ortiz DO    ** Please Note: Dictation voice to text software may have been used in the creation of this document   **

## 2018-11-11 PROBLEM — G47.33 OBSTRUCTIVE SLEEP APNEA: Status: ACTIVE | Noted: 2018-11-11

## 2018-11-11 LAB
GLUCOSE SERPL-MCNC: 163 MG/DL (ref 65–140)
GLUCOSE SERPL-MCNC: 183 MG/DL (ref 65–140)
GLUCOSE SERPL-MCNC: 227 MG/DL (ref 65–140)
GLUCOSE SERPL-MCNC: 289 MG/DL (ref 65–140)

## 2018-11-11 PROCEDURE — 82948 REAGENT STRIP/BLOOD GLUCOSE: CPT

## 2018-11-11 PROCEDURE — 94660 CPAP INITIATION&MGMT: CPT

## 2018-11-11 PROCEDURE — 99232 SBSQ HOSP IP/OBS MODERATE 35: CPT | Performed by: HOSPITALIST

## 2018-11-11 PROCEDURE — 94640 AIRWAY INHALATION TREATMENT: CPT

## 2018-11-11 PROCEDURE — 94760 N-INVAS EAR/PLS OXIMETRY 1: CPT

## 2018-11-11 PROCEDURE — 99232 SBSQ HOSP IP/OBS MODERATE 35: CPT | Performed by: INTERNAL MEDICINE

## 2018-11-11 RX ORDER — FUROSEMIDE 10 MG/ML
80 INJECTION INTRAMUSCULAR; INTRAVENOUS ONCE
Status: COMPLETED | OUTPATIENT
Start: 2018-11-11 | End: 2018-11-11

## 2018-11-11 RX ORDER — FUROSEMIDE 10 MG/ML
80 INJECTION INTRAMUSCULAR; INTRAVENOUS
Status: DISCONTINUED | OUTPATIENT
Start: 2018-11-11 | End: 2018-11-14

## 2018-11-11 RX ADMIN — FUROSEMIDE 80 MG: 10 INJECTION, SOLUTION INTRAMUSCULAR; INTRAVENOUS at 13:02

## 2018-11-11 RX ADMIN — APIXABAN 5 MG: 5 TABLET, FILM COATED ORAL at 18:04

## 2018-11-11 RX ADMIN — FUROSEMIDE 80 MG: 10 INJECTION, SOLUTION INTRAMUSCULAR; INTRAVENOUS at 18:03

## 2018-11-11 RX ADMIN — FLUTICASONE PROPIONATE 1 SPRAY: 50 SPRAY, METERED NASAL at 09:51

## 2018-11-11 RX ADMIN — DOCUSATE SODIUM 100 MG: 100 CAPSULE, LIQUID FILLED ORAL at 09:48

## 2018-11-11 RX ADMIN — PRAVASTATIN SODIUM 80 MG: 80 TABLET ORAL at 18:03

## 2018-11-11 RX ADMIN — ISODIUM CHLORIDE 3 ML: 0.03 SOLUTION RESPIRATORY (INHALATION) at 13:44

## 2018-11-11 RX ADMIN — INSULIN LISPRO 4 UNITS: 100 INJECTION, SOLUTION INTRAVENOUS; SUBCUTANEOUS at 13:01

## 2018-11-11 RX ADMIN — ISODIUM CHLORIDE 3 ML: 0.03 SOLUTION RESPIRATORY (INHALATION) at 07:34

## 2018-11-11 RX ADMIN — TAMSULOSIN HYDROCHLORIDE 0.4 MG: 0.4 CAPSULE ORAL at 18:04

## 2018-11-11 RX ADMIN — ISODIUM CHLORIDE 3 ML: 0.03 SOLUTION RESPIRATORY (INHALATION) at 19:34

## 2018-11-11 RX ADMIN — INSULIN LISPRO 1 UNITS: 100 INJECTION, SOLUTION INTRAVENOUS; SUBCUTANEOUS at 18:04

## 2018-11-11 RX ADMIN — LEVALBUTEROL HYDROCHLORIDE 1.25 MG: 1.25 SOLUTION, CONCENTRATE RESPIRATORY (INHALATION) at 19:34

## 2018-11-11 RX ADMIN — ALLOPURINOL 300 MG: 100 TABLET ORAL at 09:48

## 2018-11-11 RX ADMIN — LEVALBUTEROL HYDROCHLORIDE 1.25 MG: 1.25 SOLUTION, CONCENTRATE RESPIRATORY (INHALATION) at 13:44

## 2018-11-11 RX ADMIN — CEFEPIME HYDROCHLORIDE 2000 MG: 2 INJECTION, POWDER, FOR SOLUTION INTRAVENOUS at 09:52

## 2018-11-11 RX ADMIN — APIXABAN 5 MG: 5 TABLET, FILM COATED ORAL at 09:47

## 2018-11-11 RX ADMIN — FUROSEMIDE 40 MG: 40 TABLET ORAL at 09:47

## 2018-11-11 RX ADMIN — CEFEPIME HYDROCHLORIDE 2000 MG: 2 INJECTION, POWDER, FOR SOLUTION INTRAVENOUS at 20:24

## 2018-11-11 RX ADMIN — LEVALBUTEROL HYDROCHLORIDE 1.25 MG: 1.25 SOLUTION, CONCENTRATE RESPIRATORY (INHALATION) at 07:34

## 2018-11-11 RX ADMIN — METOPROLOL TARTRATE 50 MG: 50 TABLET, FILM COATED ORAL at 09:48

## 2018-11-11 RX ADMIN — DOCUSATE SODIUM 100 MG: 100 CAPSULE, LIQUID FILLED ORAL at 18:04

## 2018-11-11 RX ADMIN — INSULIN LISPRO 2 UNITS: 100 INJECTION, SOLUTION INTRAVENOUS; SUBCUTANEOUS at 21:37

## 2018-11-11 RX ADMIN — METOPROLOL TARTRATE 50 MG: 50 TABLET, FILM COATED ORAL at 20:21

## 2018-11-11 RX ADMIN — INSULIN LISPRO 1 UNITS: 100 INJECTION, SOLUTION INTRAVENOUS; SUBCUTANEOUS at 09:51

## 2018-11-11 RX ADMIN — VITAMIN D, TAB 1000IU (100/BT) 1000 UNITS: 25 TAB at 09:47

## 2018-11-11 NOTE — PROGRESS NOTES
Progress Note - Cardiology   Kimberley Lombardo 70 y o  male MRN: [id number]  Unit/Bed#: E4 -01 Encounter: [id number]        Asessment/Plan:  1  Sepsis:  Patient completed course of antibiotics  2  Complete heart block-present on admission:  Status post [employer] ppm 11/6/2018  3  New atrial fibrillation:  Patient remains rate controlled with heart rate 60-80  4  Acute diastolic CHF:  Improved though still with peripheral edema  5  Hypertension:  Controlled  6  Dyslipidemia:  Continues on pre-hospital dose of Zocor 40 mg/d  7  CKD, ELZBIETA:  ELZBIETA resolved with current creatinine akin to baseline (approximately 1 2)  8  Diabetes    · Current standing weight 106 5 kg ~~> continue to trend with conversion to oral Lasix today  · Atrial fibrillation with controlled heart rate on AV blocking Rx-Lopressor ~~> continue same  · Eliquis anticoagulation ongoing  · Advised patient to increase ambulation in the halls  · Wean from oxygen as able  · With no recent lipid profile will check to address efficacy of pre-hospital regimen  · With diabetic nephropathy/ proteinuria nephrology recommended resumption of ARB on discharge - agree with same      Subjective:  No complaint to me  Overall comfortable  Voiding well though still with lower extremity edema  Still wearing oxygen  Limited ambulation around the room with comfort    Vitals:  Vitals:    11/10/18 0600 11/11/18 0600   Weight: 112 kg (246 lb 11 1 oz) 107 kg (234 lb 12 6 oz)   ,  Vitals:    11/11/18 0600 11/11/18 0700 11/11/18 0734 11/11/18 1100   BP:  154/78  131/69   BP Location:  Right arm  Right arm   Pulse:  82  79   Resp:  20  20   Temp:  98 3 °F (36 8 °C)  98 2 °F (36 8 °C)   TempSrc:  Tympanic  Tympanic   SpO2:  100% 92% 98%   Weight: 107 kg (234 lb 12 6 oz)      Height:           Exam:  General:  Normally conversant comfortable appearing    Still wearing nasal cannula oxygen  Heart:  Irregular with controlled rate  Lungs: clear with moderate excursion and air exchange  Lower Limbs:  Still edematous-likely at least 1-2 +           Telemetry:       Atrial fibrillation with ventricular rate 60-80 beats per minute    Medications:    Current Facility-Administered Medications:     acetaminophen (TYLENOL) tablet 650 mg, 650 mg, Oral, 4x Daily PRN, PAT Wolfe, 650 mg at 11/10/18 1828    allopurinol (ZYLOPRIM) tablet 300 mg, 300 mg, Oral, Daily, WarrenODALIS McdanielsNP, 300 mg at 18 0948    apixaban (ELIQUIS) tablet 5 mg, 5 mg, Oral, BID, Rujul Hinds, DO, 5 mg at 18 0947    cefepime (MAXIPIME) 2,000 mg in dextrose 5 % 50 mL IVPB, 2,000 mg, Intravenous, Q12H, PAT Chan, Last Rate: 100 mL/hr at 18 0952, 2,000 mg at 18 3199    cholecalciferol (VITAMIN D3) tablet 1,000 Units, 1,000 Units, Oral, Daily, ODALIS KelleyNP, 1,000 Units at 18 0947    diltiazem (CARDIZEM) injection 10 mg, 10 mg, Intravenous, Q6H PRN, PAT Belcher, 10 mg at 18 0453    docusate sodium (COLACE) capsule 100 mg, 100 mg, Oral, BID, Genesis K PAT Ordonez, 100 mg at 18 0948    fluticasone (FLONASE) 50 mcg/act nasal spray 1 spray, 1 spray, Each Nare, Daily, Azael Prechtel, DO, 1 spray at 18 0951    furosemide (LASIX) tablet 40 mg, 40 mg, Oral, Daily, Rujul Hnids, DO, 40 mg at 18 0947    insulin lispro (HumaLOG) 100 units/mL subcutaneous injection 1-6 Units, 1-6 Units, Subcutaneous, 4x Daily (AC & HS), 1 Units at 18 0951 **AND** Fingerstick Glucose (POCT), , , 4x Daily AC and at bedtime, Pratibha Apgar, MD    levalbuterol Anna Brod) inhalation solution 1 25 mg, 1 25 mg, Nebulization, TID, PAT Cabezas, 1 25 mg at 18 0734    metoprolol tartrate (LOPRESSOR) tablet 50 mg, 50 mg, Oral, Q12H [address], Siva Turner MD, 50 mg at 18 0948    pravastatin (PRAVACHOL) tablet 80 mg, 80 mg, Oral, Daily With Dinner, PAT Olsen, 80 mg at 11/10/18 1827    senna (SENOKOT) tablet 17 2 mg, 2 tablet, Oral, HS, PAT Salazar, 17 2 mg at 11/09/18 2120    sodium chloride 0 9 % inhalation solution 3 mL, 3 mL, Nebulization, TID, Ewelina Jenkins MD, 3 mL at 11/11/18 0734    tamsulosin (FLOMAX) capsule 0 4 mg, 0 4 mg, Oral, Daily With PAT Elder, 0 4 mg at 11/10/18 1827      Labs/Data:          Results from last 7 days  Lab Units 11/09/18  0356 11/07/18  0453 11/06/18  0445   WBC Thousand/uL 10 84* 10 15 11 32*   HEMOGLOBIN g/dL 8 6* 8 5* 9 1*   HEMATOCRIT % 26 3* 25 9* 27 7*   PLATELETS Thousands/uL 341 272 286     Results from last 7 days  Lab Units 11/10/18  0836 11/09/18  0357 11/08/18  0643   POTASSIUM mmol/L 3 9 4 2 4 1   CHLORIDE mmol/L 102 102 102   CO2 mmol/L 22 23 23   BUN mg/dL 28* 34* 35*

## 2018-11-11 NOTE — PROGRESS NOTES
Progress Note - Amelia Perez 1955, 70 y o  male MRN: [id number]    Unit/Bed#: E4 -01 Encounter: [id number]    Primary Care Provider: Toni Cifuentes DO   Date and time admitted to hospital: 2018 12:10 PM        * Complete heart block ([facility] [address] )   Assessment & Plan    Presented with complete heart block  sufferred cardiac arrest in ER  Was intubated and resuscitated  Received pacer  Now tachycardic with rapid atrial fibrillation  Cards following     Obstructive sleep apnea   Assessment & Plan    Suspected  Dr Karla Haywood would like the patient to get an outpatient sleep study ASAP     Acute respiratory failure with hypoxia [facility])   Assessment & Plan    Still on IV lasix with acute pulmonary edema  Defer to Cards     Paroxysmal atrial fibrillation ([facility] )   Assessment & Plan    With rapid ventricular response  Cards is controlling rate  On diltiazem, metoprolol and apixaban     Acute kidney injury ([facility] )   Assessment & Plan    Nearly resolved  Appreciate renal help           Subjective:   Feels better  Weaned off of oxygen  No chest pain  Less dysnpea on exertion      Objective:     Vitals:   Temp (24hrs), Av 9 °F (36 6 °C), Min:97 5 °F (36 4 °C), Max:98 3 °F (36 8 °C)    Temp:  [97 5 °F (36 4 °C)-98 3 °F (36 8 °C)] 98 2 °F (36 8 °C)  HR:  [64-97] 79  Resp:  [20] 20  BP: (131-160)/(64-78) 131/69  SpO2:  [92 %-100 %] 98 %  Body mass index is 33 69 kg/m²  Input and Output Summary (last 24 hours): Intake/Output Summary (Last 24 hours) at 18 1335  Last data filed at 18 0900   Gross per 24 hour   Intake              280 ml   Output             1000 ml   Net             -720 ml       Physical Exam:     Physical Exam   HENT:   Head: Normocephalic and atraumatic  Eyes: Pupils are equal, round, and reactive to light  EOM are normal    Cardiovascular: Normal rate and regular rhythm  Exam reveals no gallop and no friction rub  No murmur heard    Pulmonary/Chest: Effort normal and breath sounds normal  He has no wheezes  He has no rales  Abdominal: Soft  There is no tenderness  Musculoskeletal: He exhibits no edema  Nursing note and vitals reviewed              Additional Data:     Labs:      Results from last 7 days  Lab Units 11/09/18  0356   WBC Thousand/uL 10 84*   HEMOGLOBIN g/dL 8 6*   HEMATOCRIT % 26 3*   PLATELETS Thousands/uL 341   NEUTROS PCT % 67   LYMPHS PCT % 13*   MONOS PCT % 9   EOS PCT % 8*       Results from last 7 days  Lab Units 11/10/18  0836   POTASSIUM mmol/L 3 9   CHLORIDE mmol/L 102   CO2 mmol/L 22   BUN mg/dL 28*   CREATININE mg/dL 1 13   CALCIUM mg/dL 8 7           Results from last 7 days  Lab Units 11/11/18  1135 11/11/18  0742 11/10/18  2102 11/10/18  1607 11/10/18  1119 11/10/18  0749 11/09/18  2052 11/09/18  1503 11/09/18  1131 11/09/18  0735 11/08/18  2117 11/08/18  1616   POC GLUCOSE mg/dl 289* 183* 234* 218* 305* 167* 169* 311* 276* 166* 200* 260*               * I Have Reviewed All Lab Data     Recent Cultures (last 7 days):             Last 24 Hours Medication List:     Current Facility-Administered Medications:  acetaminophen 650 mg Oral 4x Daily PRN PAT Douglas    allopurinol 300 mg Oral Daily PAT Berger Se    apixaban 5 mg Oral BID Rujul Hinds, DO    cefepime 2,000 mg Intravenous Q12H PAT Chan Last Rate: 2,000 mg (11/11/18 4537)   cholecalciferol 1,000 Units Oral Daily PAT Olsen    diltiazem 10 mg Intravenous Q6H PRN Genesis K PAT Ordonez    docusate sodium 100 mg Oral BID PAT Olsen    fluticasone 1 spray Each Nare Daily Azael Prechtel, DO    furosemide 80 mg Intravenous BID (diuretic) Rujul Hinds, DO    furosemide 80 mg Intravenous Once Rujul Hinds, DO    insulin lispro 1-6 Units Subcutaneous 4x Daily (AC & HS) Taco Flores MD    levalbuterol 1 25 mg Nebulization TID PAT Eng    metoprolol tartrate 50 mg Oral Q12H [facility] Richard Alcantara MD    pravastatin 80 mg Oral Daily With Raymond MENA PAT Ordonez    senna 2 tablet Oral HS Genesis K PAT Ordonez    sodium chloride 3 mL Nebulization TID Cecilia Cabrales MD    tamsulosin 0 4 mg Oral Daily With Dinner PAT Ng          VTE Pharmacologic Prophylaxis:   Pharmacologic: Apixaban (Eliquis)      Current Length of Stay: 10 day(s)    Current Patient Status: Inpatient       Discharge Plan: hopefully home in 1-2 days    Code Status: Level 1 - Full Code           Today, Patient Was Seen By: Merlin Sovereign, DO    ** Please Note: Dictation voice to text software may have been used in the creation of this document   **

## 2018-11-11 NOTE — PROGRESS NOTES
Progress Note - Cardiology   Florence Garcia 70 y o  male MRN: [id number]  Unit/Bed#: E4 -01 Encounter: [id number]      Assessment/Recommendations/Discussion:   1  Complete with shock, status post permanent pacemaker  2  New onset atrial fibrillation, adequately rate controlled  3  Acute diastolic congestive heart failure with volume overload  4  Diabetes  5  Benign essential hypertension  6  Acute kidney injury on chronic kidney disease, improved  7  Dyslipidemia  8  Mild aortic valve stenosis  9  Mild mitral valve stenosis secondary to annular calcification    · Although weaned off oxygen, still significant residual sacral, bilateral thigh and pretibial edema  Still volume overloaded  Will reinitiate furosemide 80 mg IV b i d  Baseline weight closer to  208-210 range  May use metolazone tomorrow if needed for additional diuresis  · Tolerating apixaban  Recheck hemoglobin tomorrow morning  · Atrial fibrillation adequately rate controlled, continue metoprolol        Subjective:  Patient seen and examined, feels well, denies shortness of breath with ambulation in the room    Admits to persistent edema      Physical Exam:  GEN:  NAD  HEENT:  MMM, NCAT, pink conjunctiva, EOMI, nonicteric sclera  CV:  NO JVD/HJR, RR, NO M/R/G, +S1/S2, NO PARASTERNAL HEAVE/THRILL, +++ sacral/pretibial/thigh B/L LE EDEMA, NO HEPATIC SYSTOLIC PULSATION, WARM EXTREMITIES  RESP:  CTAB/L  ABD:  SOFT, NT, NO GROSS ORGANOMEGALY        Vitals:   /69 (BP Location: Right arm)   Pulse 79   Temp 98 2 °F (36 8 °C) (Tympanic)   Resp 20   Ht 5' 10" (1 778 m)   Wt 107 kg (234 lb 12 6 oz)   SpO2 98%   BMI 33 69 kg/m²   Vitals:    11/10/18 0600 11/11/18 0600   Weight: 112 kg (246 lb 11 1 oz) 107 kg (234 lb 12 6 oz)       Intake/Output Summary (Last 24 hours) at 11/11/18 1242  Last data filed at 11/11/18 0900   Gross per 24 hour   Intake              760 ml   Output             1000 ml   Net             -240 ml       TELEMETRY: AF  Lab Results:    Results from last 7 days  Lab Units 11/09/18  0356   WBC Thousand/uL 10 84*   HEMOGLOBIN g/dL 8 6*   HEMATOCRIT % 26 3*   PLATELETS Thousands/uL 341       Results from last 7 days  Lab Units 11/10/18  0836   POTASSIUM mmol/L 3 9   CHLORIDE mmol/L 102   CO2 mmol/L 22   BUN mg/dL 28*   CREATININE mg/dL 1 13   CALCIUM mg/dL 8 7       Results from last 7 days  Lab Units 11/10/18  0836   POTASSIUM mmol/L 3 9   CHLORIDE mmol/L 102   CO2 mmol/L 22   BUN mg/dL 28*   CREATININE mg/dL 1 13   CALCIUM mg/dL 8 7           Medications:    Current Facility-Administered Medications:     acetaminophen (TYLENOL) tablet 650 mg, 650 mg, Oral, 4x Daily PRN, PAT Rayo, 650 mg at 11/10/18 1828    allopurinol (ZYLOPRIM) tablet 300 mg, 300 mg, Oral, Daily, PAT García, 300 mg at 11/11/18 0948    apixaban (ELIQUIS) tablet 5 mg, 5 mg, Oral, BID, Rujul Hinds, DO, 5 mg at 11/11/18 0947    cefepime (MAXIPIME) 2,000 mg in dextrose 5 % 50 mL IVPB, 2,000 mg, Intravenous, Q12H, PAT Chan, Last Rate: 100 mL/hr at 11/11/18 0952, 2,000 mg at 11/11/18 3598    cholecalciferol (VITAMIN D3) tablet 1,000 Units, 1,000 Units, Oral, Daily, PAT García, 1,000 Units at 11/11/18 0947    diltiazem (CARDIZEM) injection 10 mg, 10 mg, Intravenous, Q6H PRN, PAT García, 10 mg at 11/08/18 0453    docusate sodium (COLACE) capsule 100 mg, 100 mg, Oral, BID, PAT García, 100 mg at 11/11/18 0948    fluticasone (FLONASE) 50 mcg/act nasal spray 1 spray, 1 spray, Each Nare, Daily, Azael Griffin, DO, 1 spray at 11/11/18 0951    furosemide (LASIX) injection 80 mg, 80 mg, Intravenous, BID (diuretic), Rujul Hinds, DO    furosemide (LASIX) injection 80 mg, 80 mg, Intravenous, Once, Rujul Hinds, DO    insulin lispro (HumaLOG) 100 units/mL subcutaneous injection 1-6 Units, 1-6 Units, Subcutaneous, 4x Daily (AC & HS), 1 Units at 11/11/18 0951 **AND** Fingerstick Glucose (POCT), , , 4x Daily AC and at bedtime, Maegan Zambrano MD    [facility]) inhalation solution 1 25 mg, 1 25 mg, Nebulization, TID, PAT Juarez, 1 25 mg at 11/11/18 0734    metoprolol tartrate (LOPRESSOR) tablet 50 mg, 50 mg, Oral, Q12H [facility], Karina Byrne MD, 50 mg at 11/11/18 0948    pravastatin (PRAVACHOL) tablet 80 mg, 80 mg, Oral, Daily With PAT Boogie, 80 mg at 11/10/18 1827    senna (SENOKOT) tablet 17 2 mg, 2 tablet, Oral, HS, Genesis K PAT Ordonez, 17 2 mg at 11/09/18 2120    sodium chloride 0 9 % inhalation solution 3 mL, 3 mL, Nebulization, TID, Maegan Zambrano MD, 3 mL at 11/11/18 0734    tamsulosin (FLOMAX) capsule 0 4 mg, 0 4 mg, Oral, Daily With PAT Boogie, 0 4 mg at 11/10/18 1827    This note was completed in part utilizing [facility] Flu[employer] Direct Software  Grammatical errors, random word insertions, spelling mistakes, and incomplete sentences may be an occasional consequence of this system secondary to software limitations, ambient noise, and hardware issues  If you have any questions or concerns about the content, text, or information contained within the body of this dictation, please contact the provider for clarification

## 2018-11-12 PROBLEM — I50.31 ACUTE DIASTOLIC (CONGESTIVE) HEART FAILURE (HCC): Status: ACTIVE | Noted: 2018-11-12

## 2018-11-12 LAB
ANION GAP SERPL CALCULATED.3IONS-SCNC: 11 MMOL/L (ref 4–13)
BASOPHILS # BLD AUTO: 0.06 THOUSANDS/ΜL (ref 0–0.1)
BASOPHILS NFR BLD AUTO: 1 % (ref 0–1)
BUN SERPL-MCNC: 29 MG/DL (ref 5–25)
CALCIUM SERPL-MCNC: 8.6 MG/DL (ref 8.3–10.1)
CHLORIDE SERPL-SCNC: 103 MMOL/L (ref 100–108)
CO2 SERPL-SCNC: 25 MMOL/L (ref 21–32)
CREAT SERPL-MCNC: 1.09 MG/DL (ref 0.6–1.3)
EOSINOPHIL # BLD AUTO: 0.67 THOUSAND/ΜL (ref 0–0.61)
EOSINOPHIL NFR BLD AUTO: 7 % (ref 0–6)
ERYTHROCYTE [DISTWIDTH] IN BLOOD BY AUTOMATED COUNT: 14.1 % (ref 11.6–15.1)
GFR SERPL CREATININE-BSD FRML MDRD: 71 ML/MIN/1.73SQ M
GLUCOSE SERPL-MCNC: 156 MG/DL (ref 65–140)
GLUCOSE SERPL-MCNC: 162 MG/DL (ref 65–140)
GLUCOSE SERPL-MCNC: 192 MG/DL (ref 65–140)
GLUCOSE SERPL-MCNC: 192 MG/DL (ref 65–140)
GLUCOSE SERPL-MCNC: 255 MG/DL (ref 65–140)
HCT VFR BLD AUTO: 26.3 % (ref 36.5–49.3)
HGB BLD-MCNC: 8.7 G/DL (ref 12–17)
IMM GRANULOCYTES # BLD AUTO: 0.15 THOUSAND/UL (ref 0–0.2)
IMM GRANULOCYTES NFR BLD AUTO: 2 % (ref 0–2)
LYMPHOCYTES # BLD AUTO: 1.43 THOUSANDS/ΜL (ref 0.6–4.47)
LYMPHOCYTES NFR BLD AUTO: 15 % (ref 14–44)
MAGNESIUM SERPL-MCNC: 1.5 MG/DL (ref 1.6–2.6)
MCH RBC QN AUTO: 30.4 PG (ref 26.8–34.3)
MCHC RBC AUTO-ENTMCNC: 33.1 G/DL (ref 31.4–37.4)
MCV RBC AUTO: 92 FL (ref 82–98)
MONOCYTES # BLD AUTO: 0.62 THOUSAND/ΜL (ref 0.17–1.22)
MONOCYTES NFR BLD AUTO: 6 % (ref 4–12)
NEUTROPHILS # BLD AUTO: 6.79 THOUSANDS/ΜL (ref 1.85–7.62)
NEUTS SEG NFR BLD AUTO: 69 % (ref 43–75)
NRBC BLD AUTO-RTO: 0 /100 WBCS
PLATELET # BLD AUTO: 429 THOUSANDS/UL (ref 149–390)
PMV BLD AUTO: 9.1 FL (ref 8.9–12.7)
POTASSIUM SERPL-SCNC: 3.5 MMOL/L (ref 3.5–5.3)
RBC # BLD AUTO: 2.86 MILLION/UL (ref 3.88–5.62)
SODIUM SERPL-SCNC: 139 MMOL/L (ref 136–145)
WBC # BLD AUTO: 9.72 THOUSAND/UL (ref 4.31–10.16)

## 2018-11-12 PROCEDURE — 82948 REAGENT STRIP/BLOOD GLUCOSE: CPT

## 2018-11-12 PROCEDURE — 94640 AIRWAY INHALATION TREATMENT: CPT

## 2018-11-12 PROCEDURE — 80048 BASIC METABOLIC PNL TOTAL CA: CPT | Performed by: HOSPITALIST

## 2018-11-12 PROCEDURE — 99024 POSTOP FOLLOW-UP VISIT: CPT | Performed by: INTERNAL MEDICINE

## 2018-11-12 PROCEDURE — 94760 N-INVAS EAR/PLS OXIMETRY 1: CPT

## 2018-11-12 PROCEDURE — 83735 ASSAY OF MAGNESIUM: CPT | Performed by: HOSPITALIST

## 2018-11-12 PROCEDURE — 97110 THERAPEUTIC EXERCISES: CPT

## 2018-11-12 PROCEDURE — 97535 SELF CARE MNGMENT TRAINING: CPT

## 2018-11-12 PROCEDURE — 99232 SBSQ HOSP IP/OBS MODERATE 35: CPT | Performed by: FAMILY MEDICINE

## 2018-11-12 PROCEDURE — 99232 SBSQ HOSP IP/OBS MODERATE 35: CPT | Performed by: INTERNAL MEDICINE

## 2018-11-12 PROCEDURE — 97116 GAIT TRAINING THERAPY: CPT

## 2018-11-12 PROCEDURE — 85025 COMPLETE CBC W/AUTO DIFF WBC: CPT | Performed by: HOSPITALIST

## 2018-11-12 PROCEDURE — 94660 CPAP INITIATION&MGMT: CPT

## 2018-11-12 PROCEDURE — 93005 ELECTROCARDIOGRAM TRACING: CPT

## 2018-11-12 RX ORDER — SODIUM CHLORIDE FOR INHALATION 0.9 %
3 VIAL, NEBULIZER (ML) INHALATION EVERY 6 HOURS PRN
Status: DISCONTINUED | OUTPATIENT
Start: 2018-11-12 | End: 2018-11-16 | Stop reason: HOSPADM

## 2018-11-12 RX ORDER — SODIUM CHLORIDE FOR INHALATION 0.9 %
3 VIAL, NEBULIZER (ML) INHALATION
Status: DISCONTINUED | OUTPATIENT
Start: 2018-11-13 | End: 2018-11-12

## 2018-11-12 RX ORDER — MAGNESIUM SULFATE HEPTAHYDRATE 40 MG/ML
2 INJECTION, SOLUTION INTRAVENOUS ONCE
Status: COMPLETED | OUTPATIENT
Start: 2018-11-12 | End: 2018-11-12

## 2018-11-12 RX ORDER — METOLAZONE 5 MG/1
5 TABLET ORAL ONCE
Status: COMPLETED | OUTPATIENT
Start: 2018-11-12 | End: 2018-11-12

## 2018-11-12 RX ORDER — LEVALBUTEROL 1.25 MG/.5ML
1.25 SOLUTION, CONCENTRATE RESPIRATORY (INHALATION) EVERY 6 HOURS PRN
Status: DISCONTINUED | OUTPATIENT
Start: 2018-11-12 | End: 2018-11-16 | Stop reason: HOSPADM

## 2018-11-12 RX ORDER — LEVALBUTEROL 1.25 MG/.5ML
1.25 SOLUTION, CONCENTRATE RESPIRATORY (INHALATION)
Status: DISCONTINUED | OUTPATIENT
Start: 2018-11-13 | End: 2018-11-12

## 2018-11-12 RX ADMIN — PRAVASTATIN SODIUM 80 MG: 80 TABLET ORAL at 17:51

## 2018-11-12 RX ADMIN — APIXABAN 5 MG: 5 TABLET, FILM COATED ORAL at 09:17

## 2018-11-12 RX ADMIN — FUROSEMIDE 80 MG: 10 INJECTION, SOLUTION INTRAMUSCULAR; INTRAVENOUS at 17:51

## 2018-11-12 RX ADMIN — FLUTICASONE PROPIONATE 1 SPRAY: 50 SPRAY, METERED NASAL at 09:16

## 2018-11-12 RX ADMIN — INSULIN LISPRO 2 UNITS: 100 INJECTION, SOLUTION INTRAVENOUS; SUBCUTANEOUS at 21:48

## 2018-11-12 RX ADMIN — CEFEPIME HYDROCHLORIDE 2000 MG: 2 INJECTION, POWDER, FOR SOLUTION INTRAVENOUS at 10:01

## 2018-11-12 RX ADMIN — INSULIN LISPRO 3 UNITS: 100 INJECTION, SOLUTION INTRAVENOUS; SUBCUTANEOUS at 12:22

## 2018-11-12 RX ADMIN — SENNOSIDES 17.2 MG: 8.6 TABLET, FILM COATED ORAL at 21:47

## 2018-11-12 RX ADMIN — DOCUSATE SODIUM 100 MG: 100 CAPSULE, LIQUID FILLED ORAL at 17:51

## 2018-11-12 RX ADMIN — MAGNESIUM SULFATE HEPTAHYDRATE 2 G: 40 INJECTION, SOLUTION INTRAVENOUS at 10:57

## 2018-11-12 RX ADMIN — ISODIUM CHLORIDE 3 ML: 0.03 SOLUTION RESPIRATORY (INHALATION) at 07:42

## 2018-11-12 RX ADMIN — LEVALBUTEROL HYDROCHLORIDE 1.25 MG: 1.25 SOLUTION, CONCENTRATE RESPIRATORY (INHALATION) at 07:42

## 2018-11-12 RX ADMIN — INSULIN LISPRO 2 UNITS: 100 INJECTION, SOLUTION INTRAVENOUS; SUBCUTANEOUS at 17:50

## 2018-11-12 RX ADMIN — VITAMIN D, TAB 1000IU (100/BT) 1000 UNITS: 25 TAB at 09:18

## 2018-11-12 RX ADMIN — ALLOPURINOL 300 MG: 100 TABLET ORAL at 09:18

## 2018-11-12 RX ADMIN — INSULIN LISPRO 1 UNITS: 100 INJECTION, SOLUTION INTRAVENOUS; SUBCUTANEOUS at 09:16

## 2018-11-12 RX ADMIN — FUROSEMIDE 80 MG: 10 INJECTION, SOLUTION INTRAMUSCULAR; INTRAVENOUS at 09:16

## 2018-11-12 RX ADMIN — METOPROLOL TARTRATE 50 MG: 50 TABLET, FILM COATED ORAL at 21:47

## 2018-11-12 RX ADMIN — APIXABAN 5 MG: 5 TABLET, FILM COATED ORAL at 17:51

## 2018-11-12 RX ADMIN — METOPROLOL TARTRATE 50 MG: 50 TABLET, FILM COATED ORAL at 09:17

## 2018-11-12 RX ADMIN — TAMSULOSIN HYDROCHLORIDE 0.4 MG: 0.4 CAPSULE ORAL at 17:51

## 2018-11-12 RX ADMIN — METOLAZONE 5 MG: 5 TABLET ORAL at 12:26

## 2018-11-12 NOTE — OCCUPATIONAL THERAPY NOTE
[address] Progress Note     Patient Name: Ester Mcgill  EUQYI'[id number] Date: 11/12/2018  Problem List  Patient Active Problem List   Diagnosis    Complete heart block (HCC)    Metabolic acidosis    Acute kidney injury ([facility] )    Idiopathic hypotension    Other hyperlipidemia    Sepsis ([facility] )    Paroxysmal atrial fibrillation ([facility] )    Acute respiratory failure with hypoxia (HCC)    Persistent proteinuria    Volume overload    Urinary retention    Third degree heart block (HCC)    Obstructive sleep apnea    Acute diastolic (congestive) heart failure ([facility] )         11/12/18 1430   Restrictions/Precautions   Weight Bearing Precautions Per Order No   Other Precautions Telemetry;Pain  ((+) pacemaker precautions)   Lifestyle   Autonomy At baseline, pt was I w/ ADLs, IADLs, and functional mobility/transfers w/o use of AD, (+) , and reports 0 falls PTA  Reciprocal Relationships Lives with spouse, children, and 4 grandchildren   Service to Others Retired- [address] for 40 years   Intrinsic Gratification Spending time with family   Pain Assessment   Pain Assessment No/denies pain   Pain Score No Pain   ADL   Where Assessed Chair   Grooming Assistance 5  Supervision/Setup   Grooming Deficit Setup;Supervision/safety; Increased time to complete   Grooming Comments standing at sink to wash/dry hands   UB Dressing Assistance 5  Supervision/Setup   UB Dressing Deficit Setup;Supervision/safety; Increased time to complete   UB Dressing Comments UB dressing completed with Supervision 2* setup required and increased time to don/doff [facility] gown   LB Dressing Assistance 5  Supervision/Setup   LB Dressing Deficit Setup;Supervision/safety; Increased time to complete   LB Dressing Comments LB dressing completed with Supervision to don/doff B/L socks, thread BLEs into pants, and pull pants over hips     Functional Standing Tolerance   Time 5 mins   Activity Functional mobility   Comments Increased standing tolerance demonstrated in today's session with pt standing for approx  5 mins prior to requesting seated rest break  Bed Mobility   Supine to Sit Unable to assess  (Pt seated OOB in chair at start/end of session)   Sit to Supine Unable to assess  (Pt seated OOB in chair at start/end of session)   Additional Comments Pt seated OOB in chair at end of session with call bell and phone within reach  All needs met and pt reports no further questions for OT at this time  Transfers   Sit to Stand 5  Supervision   Additional items Armrests; Increased time required   Stand to Sit 5  Supervision   Additional items Armrests; Increased time required   Toilet transfer 5  Supervision   Additional items Increased time required;Standard toilet  (grab bar on R)   Additional Comments pt demonstrating good safety awareness   Functional Mobility   Functional Mobility 5  Supervision   Additional Comments Assist x1   Additional items Rolling walker   Toilet Transfers   Toilet Transfer From Rolling walker   Toilet Transfer Type To and from   Toilet Transfer to Standard toilet   Toilet Transfer Technique Stand pivot; Ambulating   Toilet Transfers Supervision   Toilet Transfers Comments Assist x1 with grab bar on R   Cognition   Overall Cognitive Status WFL   Arousal/Participation Alert; Cooperative   Attention Within functional limits   Orientation Level Oriented X4   Memory Within functional limits   Following Commands Follows one step commands without difficulty   Additional Activities   Additional Activities Other (Comment)  (Energy conservation education)   Additional Activities Comments Increased SOB demonstrated during dressing tasks, therefore educated pt on energy conservation techniques (ie: sitting during ADLs and IADLs when possible, compensatory breathing, pacing, seated/standing rest breaks, etc ) with pt verbalizing understanding of education  Pt and pt's spouse report plan to purchase shower chair to sit during ADLs upon return home  Activity Tolerance   Activity Tolerance Patient limited by fatigue;Patient tolerated treatment well   Medical Staff Made Aware Pt appropraite to be seen per nursing   Assessment   Assessment Pt seen for OT treatment session this PM focusing on functional activity tolerance, ADLs, energy conservation education, and functional mobility/transfers  Pt alert and cooperative throughout session  Pt's spouse present for entire session  Transfers (sit<>stand, RW<>standard toilet) completed with Supervision with pt demonstrating good safety awareness  Supervision only required for safety during functional mobility with use of RW  Increased standing tolerance demonstrated in today's session with pt standing for approx  5 mins prior to requesting seated rest break  Pt returned to bedside chair for ADL tasks  UB dressing completed with Supervision 2* setup required and [facility]  LB dressing completed with Supervision to don/do B/L socks, thread BLEs into pants, and pull pants over hips  Increased SOB demonstrated during dressing tasks, therefore educated pt on energy conservation techniques (ie: sitting during ADLs and IADLs when possible, compensatory breathing, pacing, seated/standing rest breaks, etc ) with pt verbalizing understanding of education  Pt and pt's spouse report plan to purchase shower chair to sit during ADLs upon return home  Pt seated OOB in chair at end of session with call bell and phone within reach  All needs met and pt reports no further questions for OT at this time  Continue to recommend Home with family support at this time  OT to follow pt on caseload  Plan   Treatment Interventions ADL retraining;Functional transfer training;UE strengthening/ROM; Endurance training;Patient/family training;Equipment evaluation/education; Compensatory technique education; Energy conservation; Activityengagement   Goal Expiration Date 11/22/18   Treatment Day 1   OT Frequency 3-5x/wk Recommendation   OT Discharge Recommendation Home with family support   OT - OK to Discharge Yes  (when medically cleared)   Barthel Index   Feeding 10   Bathing 0   Grooming Score 5   Dressing Score 5   Bladder Score 10   Bowels Score 10   Toilet Use Score 5   Transfers (Bed/Chair) Score 10   Mobility (Level Surface) Score 0   Stairs Score 0   Barthel Index Score 55   Modified Stefania Scale   Modified Sierra Scale 3       Consuelo Aggarwal, OTR/L

## 2018-11-12 NOTE — PROGRESS NOTES
Msg left for case mgmt regarding pt's wife's list of questions she has regarding discharge care and follow ups

## 2018-11-12 NOTE — PROGRESS NOTES
Progress Note - Anne Marie Bhandari 1955, 70 y o  male MRN: [id number]    Unit/Bed#: E4 -01 Encounter: [id number]    Primary Care Provider: Jaime Gallardo DO   Date and time admitted to hospital: 11/1/2018 12:10 PM        * Complete heart block [facility])   Assessment & Plan    Presented with complete heart block  sufferred cardiac arrest in ER  Was intubated and resuscitated  Received pacer  Now tachycardic with rapid atrial fibrillation  Cardiology following-appreciate their consult and recommendations  Acute respiratory failure with hypoxia [facility])   Assessment & Plan    Still on IV lasix with acute pulmonary edema  Cardiology following patient  Acute diastolic (congestive) heart failure (HCC)   Assessment & Plan    With acute pulmonary edema  Continue diuresis with Lasix 80 mg IV BID  Cardiology continues to follow patient  Continue with daily weights, strict Is&Os  Low-salt diet  Paroxysmal atrial fibrillation (HCC)   Assessment & Plan    With rapid ventricular response  Cards is controlling rate with metoprolol  Continue apixaban for anticoagulation  Acute kidney injury [facility])   Assessment & Plan    Resolved  Current creatinine 1 09  Obstructive sleep apnea   Assessment & Plan    Suspected  Dr Carlos Alberto Urbina would like the patient to get an outpatient sleep study ASAP  VTE Pharmacologic Prophylaxis:   Pharmacologic: Apixaban (Eliquis)  Mechanical VTE Prophylaxis in Place: No    Patient Centered Rounds: I have performed bedside rounds with nursing staff today  Discussions with Specialists or Other Care Team Provider:  Yes    Education and Discussions with Family / Patient:  Yes    Time Spent for Care: 20 minutes  More than 50% of total time spent on counseling and coordination of care as described above      Current Length of Stay: 11 day(s)    Current Patient Status: Inpatient   Certification Statement: The patient will continue to require additional inpatient hospital stay due to Rapid atrial fibrillation and acute diastolic congestive heart failure    Discharge Plan: To be determined    Code Status: Level 1 - Full Code      Subjective:   Patient denies any chest pain or shortness of breath  Objective:     Vitals:   Temp (24hrs), Av 1 °F (36 7 °C), Min:97 8 °F (36 6 °C), Max:98 5 °F (36 9 °C)    Temp:  [97 8 °F (36 6 °C)-98 5 °F (36 9 °C)] 98 5 °F (36 9 °C)  HR:  [64-85] 85  Resp:  [19-20] 19  BP: (131-165)/(63-81) 152/68  SpO2:  [94 %-100 %] 94 %  Body mass index is 32 9 kg/m²  Input and Output Summary (last 24 hours): Intake/Output Summary (Last 24 hours) at 18 1022  Last data filed at 18 0700   Gross per 24 hour   Intake                0 ml   Output             2325 ml   Net            -2325 ml       Physical Exam:     Physical Exam   Constitutional: He is oriented to person, place, and time  He appears well-developed and well-nourished  No distress  HENT:   Head: Normocephalic and atraumatic  Eyes: Pupils are equal, round, and reactive to light  EOM are normal    Neck: Normal range of motion  Neck supple  No JVD present  Cardiovascular: Normal rate, regular rhythm and normal heart sounds  Pulmonary/Chest: Effort normal and breath sounds normal    Abdominal: Soft  Bowel sounds are normal    Musculoskeletal: He exhibits edema  Lymphadenopathy:     He has no cervical adenopathy  Neurological: He is alert and oriented to person, place, and time  Skin: Skin is warm and dry  He is not diaphoretic           Additional Data:     Labs:      Results from last 7 days  Lab Units 18  0538   WBC Thousand/uL 9 72   HEMOGLOBIN g/dL 8 7*   HEMATOCRIT % 26 3*   PLATELETS Thousands/uL 429*   NEUTROS PCT % 69   LYMPHS PCT % 15   MONOS PCT % 6   EOS PCT % 7*       Results from last 7 days  Lab Units 18  0538   POTASSIUM mmol/L 3 5   CHLORIDE mmol/L 103   CO2 mmol/L 25   BUN mg/dL 29*   CREATININE mg/dL 1 09   ANION GAP mmol/L 11   CALCIUM mg/dL 8 6           Results from last 7 days  Lab Units 11/12/18  0728 11/11/18  2116 11/11/18  1618 11/11/18  1135 11/11/18  0742 11/10/18  2102 11/10/18  1607 11/10/18  1119 11/10/18  0749 11/09/18  2052 11/09/18  1503 11/09/18  1131   POC GLUCOSE mg/dl 156* 227* 163* 289* 183* 234* 218* 305* 167* 169* 311* 276*           Results from last 7 days  Lab Units 11/06/18  0445   PROCALCITONIN ng/ml 0 91*           * I Have Reviewed All Lab Data Listed Above  * Additional Pertinent Lab Tests Reviewed:  Jess 64 Admission Reviewed    Imaging:    Imaging Reports Reviewed Today Include:  None available  Imaging Personally Reviewed by Myself Includes:  None    Recent Cultures (last 7 days):           Last 24 Hours Medication List:     Current Facility-Administered Medications:  acetaminophen 650 mg Oral 4x Daily PRN PAT Major    allopurinol 300 mg Oral Daily PAT Stevens    apixaban 5 mg Oral BID Sravanthil Guzman, DO    cefepime 2,000 mg Intravenous Q12H PAT Chan Last Rate: 2,000 mg (11/12/18 1001)   cholecalciferol 1,000 Units Oral Daily PAT Olsen    diltiazem 10 mg Intravenous Q6H PRN PAT Stevens    docusate sodium 100 mg Oral BID PAT Olsen    fluticasone 1 spray Each Nare Daily Azael Griffin,     furosemide 80 mg Intravenous BID (diuretic) Rujul Hinds, DO    insulin lispro 1-6 Units Subcutaneous 4x Daily (AC & HS) Josh Lantigua MD    levalbuterol 1 25 mg Nebulization TID PAT Chan    magnesium sulfate 2 g Intravenous Once Toya Ramirez MD    metoprolol tartrate 50 mg Oral Q12H [facility] [place] Regis Carty MD    pravastatin 80 mg Oral Daily With PAT Daniels    senna 2 tablet Oral HS PAT Olsen    sodium chloride 3 mL Nebulization TID Josh Lantigua MD    tamsulosin 0 4 mg Oral Daily With Dinner PAT Cruz         Today, Patient Was Seen By: Toya Ramirez MD    ** Please Note: Dictation voice to text software may have been used in the creation of this document   **

## 2018-11-12 NOTE — ASSESSMENT & PLAN NOTE
With rapid ventricular response  Cards is controlling rate with metoprolol  Continue apixaban for anticoagulation

## 2018-11-12 NOTE — PHYSICAL THERAPY NOTE
Physical Therapy Treatment Note     11/12/18 7318   Pain Assessment   Pain Assessment 0-10   Pain Score 4   Pain Type Acute pain   Pain Location Chest;Shoulder   Pain Orientation Bilateral   Hospital Pain Intervention(s) Ambulation/increased activity; Emotional support; Rest   Response to Interventions tolerated   Restrictions/Precautions   Other Precautions Pain;Telemetry   General   Chart Reviewed Yes   Family/Caregiver Present Yes  (wife)   Cognition   Overall Cognitive Status WFL   Arousal/Participation Responsive; Cooperative   Attention Within functional limits   Orientation Level Oriented X4   Memory Within functional limits   Following Commands Follows all commands and directions without difficulty   Subjective   Subjective pt outof bed in chair upopn arrival   pt reports chest discomfort soreness and b/l shoulder pain  Transfers   Sit to Stand 7  Independent   Additional items Armrests   Stand to Sit 7  Independent   Additional items Armrests   Ambulation/Elevation   Gait pattern (fluent reciprocal gait)   Gait Assistance 5  Supervision   Additional items Assist x 1   Assistive Device Rolling walker   Distance 290'x1,120'x1   Stair Management Assistance 4  Minimal assist  (sup  to ascend and min assist to descend)   Additional items Assist x 1;Verbal cues   Stair Management Technique Two rails; Foreward;Nonreciprocal   Number of Stairs 4   Balance   Static Sitting Good   Dynamic Sitting Good   Static Standing Good   Dynamic Standing Good   Ambulatory Good   Endurance Deficit   Endurance Deficit Yes   Activity Tolerance   Activity Tolerance Patient tolerated treatment well   Medical Staff Made Aware Jess DAMICO   Nurse Made Aware yes   Exercises   Hip Flexion Sitting;10 reps;AROM; Bilateral   Hip Adduction Sitting;10 reps;AROM; Bilateral  (isometric hip add)   Knee AROM Long Arc Quad Sitting;10 reps;AROM; Bilateral   Assessment   Prognosis Good   Problem List Decreased strength;Decreased endurance; Impaired balance;Decreased mobility;Pain; Impaired sensation   Assessment Pt showing improved  ability to perform transfers and ambulation on level tile surfaces  Pt performs transfers sit to stand and stand to sit i'ly  PT progressed with ambulation distances to 290' and 120' with supervision assist with use of rw  Pt demonstrates steady reciprocal gait on levels with use of RW  No gross lob noted  Pt progressed to performing stair climbing with use of b/l rails with supervision to ascend and min assist to descend  Pt performs seated b/l le arom exercises with verbal cues for correct exercise techniques  Pt tolerated well  No rest periods required during mobility training this session  Recommend d /c to home with family support and OPPT  MAy benefit from cardiac rehab program and lifestyle management program      Goals   Patient Goals "To go home "   STG Expiration Date 11/18/18   Treatment Day 1   Plan   Treatment/Interventions Functional transfer training;LE strengthening/ROM; Elevations; Therapeutic exercise; Endurance training;Bed mobility; Equipment eval/education;Patient/family training;Gait training;Spoke to nursing   Progress Improving as expected   PT Frequency (4-6x/week)   Recommendation   Recommendation Home with family support; Outpatient PT  (cardiac rehab transition to lifestyle management program   )   Equipment Recommended (Rw, ? BSC)   PT - OK to Discharge Yes  (to home when medically cleared)         Claritza Walter, PTA

## 2018-11-12 NOTE — PLAN OF CARE
Problem: OCCUPATIONAL THERAPY ADULT  Goal: Performs self-care activities at highest level of function for planned discharge setting  See evaluation for individualized goals  Treatment Interventions: ADL retraining, Functional transfer training, UE strengthening/ROM, Endurance training, Patient/family training, Equipment evaluation/education, Compensatory technique education, Energy conservation, Activityengagement          See flowsheet documentation for full assessment, interventions and recommendations  Outcome: Progressing  Limitation: Decreased ADL status, Decreased UE ROM, Decreased UE strength, Decreased endurance, Decreased self-care trans, Decreased high-level ADLs  Prognosis: Good  Assessment: Pt seen for OT treatment session this PM focusing on functional activity tolerance, ADLs, energy conservation education, and functional mobility/transfers  Pt alert and cooperative throughout session  Pt's spouse present for entire session  Transfers (sit<>stand, RW<>standard toilet) completed with Supervision with pt demonstrating good safety awareness  Supervision only required for safety during functional mobility with use of RW  Increased standing tolerance demonstrated in today's session with pt standing for approx  5 mins prior to requesting seated rest break  Pt returned to bedside chair for ADL tasks  UB dressing completed with Supervision 2* setup required and increased time to don/doff [facility] gown  LB dressing completed with Supervision to don/doff B/L socks, thread BLEs into pants, and pull pants over hips  Increased SOB demonstrated during dressing tasks, therefore educated pt on energy conservation techniques (ie: sitting during ADLs and IADLs when possible, compensatory breathing, pacing, seated/standing rest breaks, etc ) with pt verbalizing understanding of education  Pt and pt's spouse report plan to purchase shower chair to sit during ADLs upon return home   Pt seated OOB in chair at end of session with call bell and phone within reach  All needs met and pt reports no further questions for OT at this time  Continue to recommend Home with family support at this time  OT to follow pt on caseload        OT Discharge Recommendation: Home with family support  OT - OK to Discharge: Yes (when medically cleared)

## 2018-11-12 NOTE — PROGRESS NOTES
MD made aware pts heart rythym was showing a vtach on the monitor  Pt was assessed and reports no symptoms  RN changed pt's lead stickers, pt is in a paced rhythm  Dr Herberth Sheridan made aware, no further orders at this time  Will continue to monitor    Strip printed and placed for scanning

## 2018-11-12 NOTE — PROGRESS NOTES
NEPHROLOGY PROGRESS NOTE   Camila Franks 70 y o  male MRN: [id number]  Unit/Bed#: E4 -01 Encounter: [id number]      ASSESSMENT & PLAN:  1  Acute kidney injury (POA) suspected secondary to ATN in the setting of decreased cardiac output, complete heart block, previous ARB and diuretic use  Serum creatinine peak at 3 0 and then improved and is back to baseline  Acute kidney injury resolved  2  Chronic kidney disease stage 3 baseline creatinine 1 1-1 2, follows with Dr Prince Quinones as an outpatient  3  Complete heart block status post perm pacemaker  4  New onset atrial fibrillation, cardiology on board  5  Acute diastolic congestive heart failure with volume overload, mild aortic stenosis and mild mitral valve stenosis, continue with intravenously diuresis as per Cardiology  6  Anemia, follow H&H     7  Hypertension blood pressure acceptable,, continue with diuresis, losartan still on hold, okay to be restart before discharge  Continue with diuresis as per Cardiology, avoid hypotension, avoid nephrotoxic, follow serial labs, no further recommendation from kidney standpoint of view, will sign off, call us if any questions  Continue follow-up with his outpatient nephrologist after discharge  Discussed with Internal Medicine attending    SUBJECTIVE:  Patient seen and examined, denies any chest pain or shortness of breath, no nausea or vomiting  OBJECTIVE:  Current Weight: Weight - Scale: 104 kg (229 lb 4 5 oz)  Vitals:    11/12/18 0734   BP: 152/68   Pulse: 85   Resp: 19   Temp: 98 5 °F (36 9 °C)   SpO2: 94%       Intake/Output Summary (Last 24 hours) at 11/12/18 1037  Last data filed at 11/12/18 0700   Gross per 24 hour   Intake                0 ml   Output             2325 ml   Net            -2325 ml     General: conscious, cooperative, in not acute distress  Eyes: conjunctivae pale, anicteric sclerae  ENT: lips and mucous membranes moist  Neck: supple    Chest: clear breath sounds bilateral, no crackles, ronchus or wheezings  CVS: distinct S1 & S2, normal rate, regular rhythm  Abdomen: soft, non-tender, non-distended, normoactive bowel sounds  Extremities: 1+ edema of both legs  Skin: no rash  Neuro: awake, alert, oriented      Medications:    Current Facility-Administered Medications:     acetaminophen (TYLENOL) tablet 650 mg, 650 mg, Oral, 4x Daily PRN, Cody Peguero, CRNP, 650 mg at 11/10/18 1828    allopurinol (ZYLOPRIM) tablet 300 mg, 300 mg, Oral, Daily, Isamar Lucas Bilofsky, CRNP, 300 mg at 11/12/18 0918    apixaban (ELIQUIS) tablet 5 mg, 5 mg, Oral, BID, Rujul Hinds, DO, 5 mg at 11/12/18 0917    cefepime (MAXIPIME) 2,000 mg in dextrose 5 % 50 mL IVPB, 2,000 mg, Intravenous, Q12H, PAT Chan, Last Rate: 100 mL/hr at 11/12/18 1001, 2,000 mg at 11/12/18 1001    cholecalciferol (VITAMIN D3) tablet 1,000 Units, 1,000 Units, Oral, Daily, Isamar Lucasregulo Chengofewa, CRNP, 1,000 Units at 11/12/18 0918    diltiazem (CARDIZEM) injection 10 mg, 10 mg, Intravenous, Q6H PRN, Isamar Lucas Bilofskjesus alberto, CRNP, 10 mg at 11/08/18 0453    docusate sodium (COLACE) capsule 100 mg, 100 mg, Oral, BID, Genesis K Bilofewa, CRNP, 100 mg at 11/11/18 1804    fluticasone (FLONASE) 50 mcg/act nasal spray 1 spray, 1 spray, Each Nare, Daily, Azael Stearnsel, DO, 1 spray at 11/12/18 0916    furosemide (LASIX) injection 80 mg, 80 mg, Intravenous, BID (diuretic), Rujul Hinds, DO, 80 mg at 11/12/18 0916    insulin lispro (HumaLOG) 100 units/mL subcutaneous injection 1-6 Units, 1-6 Units, Subcutaneous, 4x Daily (AC & HS), 1 Units at 11/12/18 0916 **AND** Fingerstick Glucose (POCT), , , 4x Daily AC and at bedtime, Kim Barlow MD    levalbuterol [place] Fruits) inhalation solution 1 25 mg, 1 25 mg, Nebulization, TID, Brandt Farrell, ODALISNP, 1 25 mg at 11/12/18 0742    magnesium sulfate 2 g/50 mL IVPB (premix) 2 g, 2 g, Intravenous, Once, Carlton Bhatt MD    metoprolol tartrate (LOPRESSOR) tablet 50 mg, 50 mg, Oral, Q12H [address], Chaya Simon MD, 50 mg at 11/12/18 0917    pravastatin (PRAVACHOL) tablet 80 mg, 80 mg, Oral, Daily With Wilson Kalata, CRNP, 80 mg at 11/11/18 1803    senna (SENOKOT) tablet 17 2 mg, 2 tablet, Oral, HS, Genesis MENA José Luis, CRNP, 17 2 mg at 11/09/18 2120    sodium chloride 0 9 % inhalation solution 3 mL, 3 mL, Nebulization, TID, Shahida Kaufman MD, 3 mL at 11/12/18 0742    tamsulosin (FLOMAX) capsule 0 4 mg, 0 4 mg, Oral, Daily With Wilson Kalata, CRNP, 0 4 mg at 11/11/18 1804    Invasive Devices:        Lab Results:     Results from last 7 days  Lab Units 11/12/18  0538 11/10/18  0836 11/09/18  0357 11/09/18  0356  11/07/18  1009 11/07/18  0453   WBC Thousand/uL 9 72  --   --  10 84*  --   --  10 15   HEMOGLOBIN g/dL 8 7*  --   --  8 6*  --   --  8 5*   HEMATOCRIT % 26 3*  --   --  26 3*  --   --  25 9*   PLATELETS Thousands/uL 429*  --   --  341  --   --  272   POTASSIUM mmol/L 3 5 3 9 4 2  --   < > 4 0  --    CHLORIDE mmol/L 103 102 102  --   < > 102  --    CO2 mmol/L 25 22 23  --   < > 24  --    BUN mg/dL 29* 28* 34*  --   < > 37*  --    CREATININE mg/dL 1 09 1 13 1 27  --   < > 1 26  --    CALCIUM mg/dL 8 6 8 7 8 7  --   < > 8 5  --    MAGNESIUM mg/dL 1 5*  --  1 8  --   --  2 0  --    < > = values in this interval not displayed  Portions of the record may have been created with voice recognition software  Occasional wrong word or "sound a like" substitutions may have occurred due to the inherent limitations of voice recognition software  Read the chart carefully and recognize, using context, where substitutions have occurred  If you have any questions, please contact the dictating provider

## 2018-11-12 NOTE — SOCIAL WORK
CM asked to meet with patient and wife, David Tavarez to answer some of wife's questions about d/c planning and follow up care  David Patch requested Script for [place], received from Dr Rashel Singleton and faxed to [facility] to dony for cost  Will f/u on cost with patient once referral is received  David Tavarez requesting a list of foods patient can and can not have and was asking about "hiring a Dietician"  CM was not sure if this was possible but did order a Nutrition Consult to meet with patient and wife to discuss diet options  CM reviewed some of the diet information in the "Heart Talk" booklet and introduced Tika Jimenes to patient and wife and explained she would f/u after discharge as well  PT recommends Home PT vs Outpatient PT depending on progress here in hospital  Patient states he does not think he will be driving anytime soon but his wife works Part time and could help with transportation  Patient states he wants to do Outpatient PT, [facility]patient PT list provided  Patient and David Tavarez were concerned about his follow up for the Outpatient Sleep Study, CM informed she will need to call Dr Franklyn Ramirez office to schedule sleep study  CM added the [employer] number to AVS as wife wanted to possibly find a new endocrinologist and a dermatologist after d/c  Referral made for Outpatient CM to help with transitions of care and care coordination after discharge  CM will continue to follow       Belkis Crane RN  11/12/18  3:44 PM

## 2018-11-12 NOTE — ASSESSMENT & PLAN NOTE
Presented with complete heart block  sufferred cardiac arrest in ER  Was intubated and resuscitated  Received pacer  Now tachycardic with rapid atrial fibrillation  Cardiology following-appreciate their consult and recommendations

## 2018-11-12 NOTE — PROGRESS NOTES
Progress Note - Cardiology   Deng Ya 70 y o  male MRN: [id number]  Unit/Bed#: E4 -01 Encounter: [id number]      Assessment:     1  Complete heart block status post permanent pacemaker  2  New onset atrial fibrillation  3  Acute diastolic congestive heart failure with volume overload  4  Acute renal failure-resolved  5  Diabetes  6  Hypertension  7  Mild aortic stenosis and mild mitral valve stenosis  8  Dyslipidemia    Discussion/Recommendations:    Continue IV diuresis  Hemoglobin stable on Eliquis  Will check electrocardiogram to check rhythm      Subjective:  Wants to go home      Physical Exam:  GEN:  NAD  HEENT:  MMM, NCAT, pink conjunctiva, EOMI, nonicteric sclera  CV:  JVP not well visualized, RR, NO M/R/G, +S1/S2, NO PARASTERNAL HEAVE/THRILL, 1+ LE EDEMA, NO HEPATIC SYSTOLIC PULSATION, WARM EXTREMITIES  RESP:  CTAB/L  ABD:  SOFT, NT, NO GROSS ORGANOMEGALY        Vitals:   /68 (BP Location: Left arm)   Pulse 85   Temp 98 5 °F (36 9 °C) (Tympanic)   Resp 19   Ht 5' 10" (1 778 m)   Wt 104 kg (229 lb 4 5 oz)   SpO2 94%   BMI 32 90 kg/m²   Vitals:    11/11/18 0600 11/12/18 0734   Weight: 107 kg (234 lb 12 6 oz) 104 kg (229 lb 4 5 oz)       Intake/Output Summary (Last 24 hours) at 11/12/18 0946  Last data filed at 11/12/18 0700   Gross per 24 hour   Intake                0 ml   Output             2325 ml   Net            -2325 ml         TELEMETRY:  Possibly sinus rhythm  Lab Results:    Results from last 7 days  Lab Units 11/12/18  0538   WBC Thousand/uL 9 72   HEMOGLOBIN g/dL 8 7*   HEMATOCRIT % 26 3*   PLATELETS Thousands/uL 429*       Results from last 7 days  Lab Units 11/12/18  0538   POTASSIUM mmol/L 3 5   CHLORIDE mmol/L 103   CO2 mmol/L 25   BUN mg/dL 29*   CREATININE mg/dL 1 09   CALCIUM mg/dL 8 6       Results from last 7 days  Lab Units 11/12/18  0538   POTASSIUM mmol/L 3 5   CHLORIDE mmol/L 103   CO2 mmol/L 25   BUN mg/dL 29*   CREATININE mg/dL 1 09   CALCIUM mg/dL 8 6 Medications:    Current Facility-Administered Medications:     acetaminophen (TYLENOL) tablet 650 mg, 650 mg, Oral, 4x Daily PRN, KiarasariPAT Harper, 650 mg at 11/10/18 1828    allopurinol (ZYLOPRIM) tablet 300 mg, 300 mg, Oral, Daily, PAT Stevens, 300 mg at 11/12/18 0918    apixaban (ELIQUIS) tablet 5 mg, 5 mg, Oral, BID, Rujul Hinds, DO, 5 mg at 11/12/18 0917    cefepime (MAXIPIME) 2,000 mg in dextrose 5 % 50 mL IVPB, 2,000 mg, Intravenous, Q12H, PAT Chan, Last Rate: 100 mL/hr at 11/11/18 2024, 2,000 mg at 11/11/18 2024    cholecalciferol (VITAMIN D3) tablet 1,000 Units, 1,000 Units, Oral, Daily, PAT Cruz, 1,000 Units at 11/12/18 0918    diltiazem (CARDIZEM) injection 10 mg, 10 mg, Intravenous, Q6H PRN, PAT Stevens, 10 mg at 11/08/18 0453    docusate sodium (COLACE) capsule 100 mg, 100 mg, Oral, BID, PAT Olsen, 100 mg at 11/11/18 1804    fluticasone (FLONASE) 50 mcg/act nasal spray 1 spray, 1 spray, Each Nare, Daily, Azael Griffin DO, 1 spray at 11/12/18 0916    furosemide (LASIX) injection 80 mg, 80 mg, Intravenous, BID (diuretic), Rujul Hinds, DO, 80 mg at 11/12/18 0916    insulin lispro (HumaLOG) 100 units/mL subcutaneous injection 1-6 Units, 1-6 Units, Subcutaneous, 4x Daily (AC & HS), 1 Units at 11/12/18 0916 **AND** Fingerstick Glucose (POCT), , , 4x Daily AC and at bedtime, Josh Lantigua MD    levalbuterol Khushita Boeck) inhalation solution 1 25 mg, 1 25 mg, Nebulization, TID, Charyl Raya, CRNP, 1 25 mg at 11/12/18 0742    magnesium sulfate 2 g/50 mL IVPB (premix) 2 g, 2 g, Intravenous, Once, Toya Ramirez MD    metoprolol tartrate (LOPRESSOR) tablet 50 mg, 50 mg, Oral, Q12H [facility] [place] MD Machelle, 50 mg at 11/12/18 0917    pravastatin (PRAVACHOL) tablet 80 mg, 80 mg, Oral, Daily With PAT Ruelas, 80 mg at 11/11/18 1803    senna (SENOKOT) tablet 17 2 mg, 2 tablet, Oral, HS, PAT Olsen, 17 2 mg at 11/09/18 2120    sodium chloride 0 9 % inhalation solution 3 mL, 3 mL, Nebulization, TID, Damir Wellington MD, 3 mL at 11/12/18 0742    tamsulosin (FLOMAX) capsule 0 4 mg, 0 4 mg, Oral, Daily With Scarlet Skill, PAT, 0 4 mg at 11/11/18 1804    Portions of the record may have been created with voice recognition software  Occasional wrong word or "sound a like" substitutions may have occurred due to the inherent limitations of voice recognition software  Read the chart carefully and recognize, using context, where substitutions have occurred

## 2018-11-12 NOTE — PLAN OF CARE
Problem: PHYSICAL THERAPY ADULT  Goal: Performs mobility at highest level of function for planned discharge setting  See evaluation for individualized goals  Treatment/Interventions: Functional transfer training, LE strengthening/ROM, Elevations, Therapeutic exercise, Endurance training, Patient/family training, Equipment eval/education, Bed mobility, Gait training, Compensatory technique education, Continued evaluation, Spoke to nursing, OT, Family  Equipment Recommended: Nery Tripathi (RW   Monitor )       See flowsheet documentation for full assessment, interventions and recommendations  Outcome: Progressing  Prognosis: Good  Problem List: Decreased strength, Decreased endurance, Impaired balance, Decreased mobility, Pain, Impaired sensation  Assessment: Pt showing improved  ability to perform transfers and ambulation on level tile surfaces  Pt performs transfers sit to stand and stand to sit i'ly  PT progressed with ambulation distances to 290' and 120' with supervision assist with use of rw  Pt demonstrates steady reciprocal gait on levels with use of RW  No gross lob noted  Pt progressed to performing stair climbing with use of b/l rails with supervision to ascend and min assist to descend  Pt performs seated b/l le arom exercises with verbal cues for correct exercise techniques  Pt tolerated well  No rest periods required during mobility training this session  Recommend d /c to home with family support and OPPT  MAy benefit from cardiac rehab program and lifestyle management program     Barriers to Discharge: Inaccessible home environment (2nd floor shower)     Recommendation: Home with family support, Outpatient PT (cardiac rehab transition to lifestyle management program   )     PT - OK to Discharge: Yes (to home when medically cleared)    See flowsheet documentation for full assessment

## 2018-11-12 NOTE — ASSESSMENT & PLAN NOTE
With acute pulmonary edema  Continue diuresis with Lasix 80 mg IV BID  Cardiology continues to follow patient  Continue with daily weights, strict Is&Os  Low-salt diet

## 2018-11-13 PROBLEM — N17.9 ACUTE KIDNEY INJURY (HCC): Status: RESOLVED | Noted: 2018-11-01 | Resolved: 2018-11-13

## 2018-11-13 LAB
ANION GAP SERPL CALCULATED.3IONS-SCNC: 9 MMOL/L (ref 4–13)
BASOPHILS # BLD AUTO: 0.02 THOUSANDS/ΜL (ref 0–0.1)
BASOPHILS NFR BLD AUTO: 0 % (ref 0–1)
BUN SERPL-MCNC: 28 MG/DL (ref 5–25)
CALCIUM SERPL-MCNC: 8.8 MG/DL (ref 8.3–10.1)
CHLORIDE SERPL-SCNC: 102 MMOL/L (ref 100–108)
CO2 SERPL-SCNC: 28 MMOL/L (ref 21–32)
CREAT SERPL-MCNC: 1.22 MG/DL (ref 0.6–1.3)
EOSINOPHIL # BLD AUTO: 0.77 THOUSAND/ΜL (ref 0–0.61)
EOSINOPHIL NFR BLD AUTO: 7 % (ref 0–6)
ERYTHROCYTE [DISTWIDTH] IN BLOOD BY AUTOMATED COUNT: 14.1 % (ref 11.6–15.1)
GFR SERPL CREATININE-BSD FRML MDRD: 62 ML/MIN/1.73SQ M
GLUCOSE SERPL-MCNC: 143 MG/DL (ref 65–140)
GLUCOSE SERPL-MCNC: 162 MG/DL (ref 65–140)
GLUCOSE SERPL-MCNC: 203 MG/DL (ref 65–140)
GLUCOSE SERPL-MCNC: 231 MG/DL (ref 65–140)
GLUCOSE SERPL-MCNC: 247 MG/DL (ref 65–140)
HCT VFR BLD AUTO: 26.2 % (ref 36.5–49.3)
HGB BLD-MCNC: 8.7 G/DL (ref 12–17)
IMM GRANULOCYTES # BLD AUTO: 0.12 THOUSAND/UL (ref 0–0.2)
IMM GRANULOCYTES NFR BLD AUTO: 1 % (ref 0–2)
LYMPHOCYTES # BLD AUTO: 1.73 THOUSANDS/ΜL (ref 0.6–4.47)
LYMPHOCYTES NFR BLD AUTO: 15 % (ref 14–44)
MCH RBC QN AUTO: 30.6 PG (ref 26.8–34.3)
MCHC RBC AUTO-ENTMCNC: 33.2 G/DL (ref 31.4–37.4)
MCV RBC AUTO: 92 FL (ref 82–98)
MONOCYTES # BLD AUTO: 0.83 THOUSAND/ΜL (ref 0.17–1.22)
MONOCYTES NFR BLD AUTO: 7 % (ref 4–12)
NEUTROPHILS # BLD AUTO: 7.95 THOUSANDS/ΜL (ref 1.85–7.62)
NEUTS SEG NFR BLD AUTO: 70 % (ref 43–75)
NRBC BLD AUTO-RTO: 0 /100 WBCS
PLATELET # BLD AUTO: 425 THOUSANDS/UL (ref 149–390)
PMV BLD AUTO: 8.8 FL (ref 8.9–12.7)
POTASSIUM SERPL-SCNC: 3.6 MMOL/L (ref 3.5–5.3)
RBC # BLD AUTO: 2.84 MILLION/UL (ref 3.88–5.62)
SODIUM SERPL-SCNC: 139 MMOL/L (ref 136–145)
WBC # BLD AUTO: 11.42 THOUSAND/UL (ref 4.31–10.16)

## 2018-11-13 PROCEDURE — 97116 GAIT TRAINING THERAPY: CPT

## 2018-11-13 PROCEDURE — 97530 THERAPEUTIC ACTIVITIES: CPT

## 2018-11-13 PROCEDURE — 82948 REAGENT STRIP/BLOOD GLUCOSE: CPT

## 2018-11-13 PROCEDURE — 99024 POSTOP FOLLOW-UP VISIT: CPT | Performed by: INTERNAL MEDICINE

## 2018-11-13 PROCEDURE — 85025 COMPLETE CBC W/AUTO DIFF WBC: CPT | Performed by: FAMILY MEDICINE

## 2018-11-13 PROCEDURE — 97110 THERAPEUTIC EXERCISES: CPT

## 2018-11-13 PROCEDURE — 80048 BASIC METABOLIC PNL TOTAL CA: CPT | Performed by: FAMILY MEDICINE

## 2018-11-13 PROCEDURE — 99232 SBSQ HOSP IP/OBS MODERATE 35: CPT | Performed by: FAMILY MEDICINE

## 2018-11-13 PROCEDURE — 94660 CPAP INITIATION&MGMT: CPT

## 2018-11-13 RX ORDER — AMLODIPINE BESYLATE 10 MG/1
10 TABLET ORAL DAILY
Status: DISCONTINUED | OUTPATIENT
Start: 2018-11-13 | End: 2018-11-16 | Stop reason: HOSPADM

## 2018-11-13 RX ORDER — METOLAZONE 5 MG/1
5 TABLET ORAL ONCE
Status: COMPLETED | OUTPATIENT
Start: 2018-11-13 | End: 2018-11-13

## 2018-11-13 RX ADMIN — APIXABAN 5 MG: 5 TABLET, FILM COATED ORAL at 17:29

## 2018-11-13 RX ADMIN — SENNOSIDES 17.2 MG: 8.6 TABLET, FILM COATED ORAL at 21:51

## 2018-11-13 RX ADMIN — VITAMIN D, TAB 1000IU (100/BT) 1000 UNITS: 25 TAB at 08:07

## 2018-11-13 RX ADMIN — TAMSULOSIN HYDROCHLORIDE 0.4 MG: 0.4 CAPSULE ORAL at 17:29

## 2018-11-13 RX ADMIN — FUROSEMIDE 80 MG: 10 INJECTION, SOLUTION INTRAMUSCULAR; INTRAVENOUS at 08:07

## 2018-11-13 RX ADMIN — ALLOPURINOL 300 MG: 100 TABLET ORAL at 08:07

## 2018-11-13 RX ADMIN — INSULIN LISPRO 3 UNITS: 100 INJECTION, SOLUTION INTRAVENOUS; SUBCUTANEOUS at 12:32

## 2018-11-13 RX ADMIN — METOLAZONE 5 MG: 5 TABLET ORAL at 16:43

## 2018-11-13 RX ADMIN — FLUTICASONE PROPIONATE 1 SPRAY: 50 SPRAY, METERED NASAL at 08:07

## 2018-11-13 RX ADMIN — PRAVASTATIN SODIUM 80 MG: 80 TABLET ORAL at 17:29

## 2018-11-13 RX ADMIN — INSULIN LISPRO 1 UNITS: 100 INJECTION, SOLUTION INTRAVENOUS; SUBCUTANEOUS at 10:02

## 2018-11-13 RX ADMIN — AMLODIPINE BESYLATE 10 MG: 10 TABLET ORAL at 12:25

## 2018-11-13 RX ADMIN — INSULIN LISPRO 3 UNITS: 100 INJECTION, SOLUTION INTRAVENOUS; SUBCUTANEOUS at 21:52

## 2018-11-13 RX ADMIN — FUROSEMIDE 80 MG: 10 INJECTION, SOLUTION INTRAMUSCULAR; INTRAVENOUS at 17:29

## 2018-11-13 RX ADMIN — APIXABAN 5 MG: 5 TABLET, FILM COATED ORAL at 08:08

## 2018-11-13 RX ADMIN — INSULIN LISPRO 2 UNITS: 100 INJECTION, SOLUTION INTRAVENOUS; SUBCUTANEOUS at 17:30

## 2018-11-13 RX ADMIN — METOPROLOL TARTRATE 50 MG: 50 TABLET, FILM COATED ORAL at 08:07

## 2018-11-13 RX ADMIN — DOCUSATE SODIUM 100 MG: 100 CAPSULE, LIQUID FILLED ORAL at 08:07

## 2018-11-13 RX ADMIN — METOPROLOL TARTRATE 50 MG: 50 TABLET, FILM COATED ORAL at 21:51

## 2018-11-13 NOTE — SOCIAL WORK
Per [facility], pt has no copay for r/w but $36 for shower chair  Pt is agreeable to cost, this cm delivered r/w and shower chair to room  CM following

## 2018-11-13 NOTE — PLAN OF CARE
Problem: PHYSICAL THERAPY ADULT  Goal: Performs mobility at highest level of function for planned discharge setting  See evaluation for individualized goals  Treatment/Interventions: Functional transfer training, LE strengthening/ROM, Elevations, Therapeutic exercise, Endurance training, Patient/family training, Equipment eval/education, Bed mobility, Gait training, Compensatory technique education, Continued evaluation, Spoke to nursing, OT, Family  Equipment Recommended: Laney Clancy (RW   Monitor )       See flowsheet documentation for full assessment, interventions and recommendations  Outcome: Progressing  Prognosis: Good  Problem List: Decreased endurance, Impaired balance, Impaired sensation, Decreased mobility, Decreased strength  Assessment: Pt performs transfers with  Mod I- I and ambulation on level tile surfaces with mod I with Rw  PT progressed with ambulation distances to 300' and 120'  Attempted  progressing pt to use of no AD, however pt  Declined and reported wanting to continue to use rw at this time  Pt demonstrates steady reciprocal gait on levels with use of RW  No gross lob noted  Pt progressed to performing stair climbing with use of b/l rails with supervision to ascend and descend stairs  Pt progressed to performing b/l le standing there ex with b/l or unilateral ue support for balance and safety x 15 reps  Pt performed standing balance activities without lob or unsteadiness  Noted fatigue and mild HOROWITZ with b/l le standing there ex requiring seated rest breaks x2  Pt tolerated well  Recommend d/c to home with family support and OPPT  MAy benefit from cardiac rehab program and lifestyle management program at d/c  Trial stair climbing with use of one rail next session and ambulation without device     Barriers to Discharge: Inaccessible home environment (2nd floor shower)     Recommendation: Home with family support, Outpatient PT (cardiac rehab transtioning to lifestyle management program  )     PT - OK to Discharge: Yes (to home)    See flowsheet documentation for full assessment

## 2018-11-13 NOTE — ASSESSMENT & PLAN NOTE
With acute pulmonary edema  Continue diuresis with Lasix 80 mg IV BID  Cardiology continues to follow patient  Continue with daily weights(104 kg--> 103kg), strict Is&Os  Low-salt diet

## 2018-11-13 NOTE — PROGRESS NOTES
Progress Note - Cardiology   Emani Speaker 70 y o  male MRN: [id number]  Unit/Bed#: E4 -01 Encounter: [id number]      Assessment:     1  Complete heart block status post permanent pacemaker  2  New onset atrial fibrillation-now sinus  3  Acute diastolic congestive heart failure with volume overload  4  Acute renal failure-resolved  5  Diabetes  6  Hypertension  7  Mild aortic stenosis and likely non severe mitral valve stenosis  8  Dyslipidemia    Discussion/Recommendations:    Back in sinus rhythm  Diuresing well  Will another dose of Zaroxolyn today  Continue Eliquis  Will restart Norvasc as blood pressure is high        Subjective:  Feels well      Physical Exam:  GEN:  NAD  HEENT:  MMM, NCAT, pink conjunctiva, EOMI, nonicteric sclera  CV:  NO JVD/HJR, RR, NO M/R/G, +S1/S2, NO PARASTERNAL HEAVE/THRILL, 1+ LE EDEMA, NO HEPATIC SYSTOLIC PULSATION, WARM EXTREMITIES  RESP:  CTAB/L  ABD:  SOFT, NT, NO GROSS ORGANOMEGALY        Vitals:   /74 (BP Location: Left arm)   Pulse 67   Temp (!) 97 4 °F (36 3 °C) (Tympanic)   Resp 18   Ht 5' 10" (1 778 m)   Wt 103 kg (226 lb 13 7 oz)   SpO2 97%   BMI 32 55 kg/m²   Vitals:    11/12/18 0734 11/13/18 0600   Weight: 104 kg (229 lb 4 5 oz) 103 kg (226 lb 13 7 oz)       Intake/Output Summary (Last 24 hours) at 11/13/18 1051  Last data filed at 11/13/18 0900   Gross per 24 hour   Intake              360 ml   Output             3850 ml   Net            -3490 ml         TELEMETRY:  Now sinus rhythm  Lab Results:    Results from last 7 days  Lab Units 11/13/18  0541   WBC Thousand/uL 11 42*   HEMOGLOBIN g/dL 8 7*   HEMATOCRIT % 26 2*   PLATELETS Thousands/uL 425*       Results from last 7 days  Lab Units 11/13/18  0541   POTASSIUM mmol/L 3 6   CHLORIDE mmol/L 102   CO2 mmol/L 28   BUN mg/dL 28*   CREATININE mg/dL 1 22   CALCIUM mg/dL 8 8       Results from last 7 days  Lab Units 11/13/18  0541   POTASSIUM mmol/L 3 6   CHLORIDE mmol/L 102   CO2 mmol/L 28   BUN mg/dL 28*   CREATININE mg/dL 1 22   CALCIUM mg/dL 8 8             Medications:    Current Facility-Administered Medications:     acetaminophen (TYLENOL) tablet 650 mg, 650 mg, Oral, 4x Daily PRN, PAT Nava, 650 mg at 11/10/18 1828    allopurinol (ZYLOPRIM) tablet 300 mg, 300 mg, Oral, Daily, Isamar Awais Bilofewa, CRNP, 300 mg at 11/13/18 0807    apixaban (ELIQUIS) tablet 5 mg, 5 mg, Oral, BID, Rujul Hinds, DO, 5 mg at 11/13/18 0808    cholecalciferol (VITAMIN D3) tablet 1,000 Units, 1,000 Units, Oral, Daily, PAT Lopez, 1,000 Units at 11/13/18 0807    diltiazem (CARDIZEM) injection 10 mg, 10 mg, Intravenous, Q6H PRN, Isamar Yolo Bilofewa, CRNP, 10 mg at 11/08/18 0453    docusate sodium (COLACE) capsule 100 mg, 100 mg, Oral, BID, Isamar Yolo Bilofsky, CRNP, 100 mg at 11/13/18 0807    fluticasone (FLONASE) 50 mcg/act nasal spray 1 spray, 1 spray, Each Nare, Daily, Azael Griffin, DO, 1 spray at 11/13/18 0807    furosemide (LASIX) injection 80 mg, 80 mg, Intravenous, BID (diuretic), Rujul Hinds, DO, 80 mg at 11/13/18 0807    insulin lispro (HumaLOG) 100 units/mL subcutaneous injection 1-6 Units, 1-6 Units, Subcutaneous, 4x Daily (AC & HS), 1 Units at 11/13/18 1002 **AND** Fingerstick Glucose (POCT), , , 4x Daily AC and at bedtime, Kim Barlow MD    levalbuterol [place] Fruits) inhalation solution 1 25 mg, 1 25 mg, Nebulization, Q6H PRN, Carlton Bhatt MD    metoprolol tartrate (LOPRESSOR) tablet 50 mg, 50 mg, Oral, Q12H [address], Chaya Simon MD, 50 mg at 11/13/18 0807    pravastatin (PRAVACHOL) tablet 80 mg, 80 mg, Oral, Daily With Dinner, PAT Lopez, 80 mg at 11/12/18 1751    senna (SENOKOT) tablet 17 2 mg, 2 tablet, Oral, HS, Genesis K PAT Ordonez, 17 2 mg at 11/12/18 2147    sodium chloride 0 9 % inhalation solution 3 mL, 3 mL, Nebulization, Q6H PRN, Carlton Bhatt MD    tamsulosin (FLOMAX) capsule 0 4 mg, 0 4 mg, Oral, Daily With Dinner, PAT Lopez, 0 4 mg at 11/12/18 1561    Portions of the record may have been created with voice recognition software  Occasional wrong word or "sound a like" substitutions may have occurred due to the inherent limitations of voice recognition software  Read the chart carefully and recognize, using context, where substitutions have occurred

## 2018-11-13 NOTE — PHYSICAL THERAPY NOTE
Physical Therapy Treatment Note     11/13/18 1548   Pain Assessment   Pain Assessment No/denies pain   Restrictions/Precautions   Other Precautions Telemetry; Fall Risk   General   Chart Reviewed Yes   Family/Caregiver Present Yes  (wife)   Cognition   Overall Cognitive Status WFL   Arousal/Participation Alert; Cooperative   Attention Within functional limits   Orientation Level Oriented X4   Memory Within functional limits   Following Commands Follows all commands and directions without difficulty   Subjective   Subjective Pt out of bed in chair  Transfers   Sit to Stand 6  Modified independent  (mod I- I)   Stand to Sit 6  Modified independent  (mod I - I)   Ambulation/Elevation   Gait pattern (fluent reciprocal steps )   Gait Assistance 6  Modified independent   Additional items Assist x 1   Assistive Device Rolling walker   Distance 300'x1, 120'x1   Stair Management Assistance 5  Supervision   Additional items Assist x 1;Verbal cues; Increased time required   Stair Management Technique Two rails; Foreward;Nonreciprocal   Number of Stairs 12   Balance   Static Sitting Normal   Dynamic Sitting Good   Static Standing Good   Dynamic Standing Good   Ambulatory Good   Endurance Deficit   Endurance Deficit Yes   Endurance Deficit Description mild HOROWITZ   Activity Tolerance   Activity Tolerance Patient limited by fatigue;Patient tolerated treatment well  (mild HOROWITZ noted with standing ther ex   )   Exercises   Hip Flexion Standing;15 reps;AROM; Bilateral  (with unilateral ue support on back of chair   )   Hip Abduction Standing;15 reps;AROM; Bilateral  (with b/l ue support on back of chair  )   Hip Adduction Standing;15 reps;AROM; Bilateral  (with b/l ue support on back of chair  )   Ankle Pumps (toe raises x 15 reps with b/l ue support on back of chair  )   Marching Standing;15 reps;AROM; Bilateral  (with b/l ue support on back of chair)   Balance training  pt performed side stepping and backward [facility] use of and AD with supervision 10'x2  Equipment Use   Comments Pt's rw and shower chair adjusted to correct height for pt's use oat home  pt  and wife educated on car transfers, answered questions regarding 3-1 commode, toilet transfers  Pt 's wife concerned toilet is too low for pt at home, pt feels he will be abl to manage  encouraged pt and wife to discuss whether they want a 3-1 commode for home once Pt ashleighjosé luiso is over and will have pamela come in to speak with them  discussed home mangement techniques with limiting stair climbing to twice/day amber in Am and up at night  Assessment   Prognosis Good   Problem List Decreased endurance; Impaired balance; Impaired sensation;Decreased mobility; Decreased strength   Assessment Pt performs transfers with  Mod I- I and ambulation on level tile surfaces with mod I with Rw  PT progressed with ambulation distances to 300' and 120'  Attempted  progressing pt to use of no AD, however pt  Declined and reported wanting to continue to use rw at this time  Pt demonstrates steady reciprocal gait on levels with use of RW  No gross lob noted  Pt progressed to performing stair climbing with use of b/l rails with supervision to ascend and descend stairs  Pt progressed to performing b/l le standing there ex with b/l or unilateral ue support for balance and safety x 15 reps  Pt performed standing balance activities without lob or unsteadiness  Noted fatigue and mild HOROWITZ with b/l le standing there ex requiring seated rest breaks x2  Pt tolerated well  Recommend d/c to home with family support and OPPT  MAy benefit from cardiac rehab program and lifestyle management program at d/c  Trial stair climbing with use of one rail next session and ambulation without device  Goals   Patient Goals " to go home  "     STG Expiration Date 11/18/18   Treatment Day 2   Plan   Treatment/Interventions Functional transfer training;LE strengthening/ROM; Elevations; Therapeutic exercise; Endurance training;Patient/family training;Equipment eval/education; Bed mobility;Gait training;Spoke to nursing;Spoke to case management; Family   Progress Improving as expected   PT Frequency (4-6xweek)   Recommendation   Recommendation Home with family support; Outpatient PT  (cardiac rehab transtioning to lifestyle management program  )   PT - OK to Discharge Yes  (to home)         Nini James, PTA

## 2018-11-13 NOTE — ASSESSMENT & PLAN NOTE
With rapid ventricular response  Continue controlling rate with metoprolol  Continue apixaban for anticoagulation

## 2018-11-13 NOTE — UTILIZATION REVIEW
Continued Stay Review    Date: 11/13/18    Vital Signs: /73 (BP Location: Left arm)   Pulse 63   Temp (!) 97 4 °F (36 3 °C) (Tympanic)   Resp 18   Ht 5' 10" (1 778 m)   Wt 103 kg (226 lb 13 7 oz)   SpO2 97%   BMI 32 55 kg/m²     Medications:   Scheduled Meds:   Current Facility-Administered Medications:  acetaminophen 650 mg Oral 4x Daily PRN   allopurinol 300 mg Oral Daily   amLODIPine 10 mg Oral Daily   apixaban 5 mg Oral BID   cholecalciferol 1,000 Units Oral Daily   diltiazem 10 mg Intravenous Q6H PRN   docusate sodium 100 mg Oral BID   fluticasone 1 spray Each Nare Daily   furosemide 80 mg Intravenous BID (diuretic)   insulin lispro 1-6 Units Subcutaneous 4x Daily (AC & HS)   levalbuterol 1 25 mg Nebulization Q6H PRN   metolazone 5 mg Oral Once   metoprolol tartrate 50 mg Oral Q12H REBA   pravastatin 80 mg Oral Daily With Dinner   senna 2 tablet Oral HS   sodium chloride 3 mL Nebulization Q6H PRN   tamsulosin 0 4 mg Oral Daily With Dinner     PRN Meds:   acetaminophen    diltiazem    levalbuterol    sodium chloride    Abnormal Labs/Diagnostic Results:     BUN 28  GLUCOSE 143  WBC 11 42 - TRENDING UP   H/H 807/26 2 STABLE  PLATELETS 470  POC GLUCOSE 162, 247      Age/Sex: 70 y o  male     Assessment/Plan: CARDIAC ARREST IN ED FROM COMPLETE HEART BLOCK  RECEIVED PACER  NOW HAS TACHYCARDIA WITH RAPID AT [facility]  REMAINS ON IV LASIX FOR PULM EDEMA  DOING ANOTHER DOSE OF ZAROXOYLN TODAY  RESTARTING NORVASC FOR HTN  APPEARS TO BE BACK IN [facility]        Discharge Plan: HOME

## 2018-11-13 NOTE — PROGRESS NOTES
Progress Note - Grace Lyon 1955, 70 y o  male MRN: [id number]    Unit/Bed#: E4 -01 Encounter: [id number]    Primary Care Provider: Alicja Chacko DO   Date and time admitted to hospital: 11/1/2018 12:10 PM        * Complete heart block [facility])   Assessment & Plan    Presented with complete heart block  sufferred cardiac arrest in ER  Was intubated and resuscitated  Received pacer  Now tachycardic with rapid atrial fibrillation  Cardiology following-appreciate their consult and recommendations  Acute respiratory failure with hypoxia [facility])   Assessment & Plan    Still on IV lasix with acute pulmonary edema  Cardiology following patient  Acute diastolic (congestive) heart failure (HCC)   Assessment & Plan    With acute pulmonary edema  Continue diuresis with Lasix 80 mg IV BID  Cardiology continues to follow patient  Continue with daily weights(104 kg--> 103kg), strict Is&Os  Low-salt diet  Paroxysmal atrial fibrillation (HCC)   Assessment & Plan    With rapid ventricular response  Continue controlling rate with metoprolol  Continue apixaban for anticoagulation  Acute kidney injury (HCC)-resolved as of 11/13/2018   Assessment & Plan    Resolved  Current creatinine 1 09  Obstructive sleep apnea   Assessment & Plan    Suspected  Dr Kenia Doyle would like the patient to get an outpatient sleep study ASAP  VTE Pharmacologic Prophylaxis:   Pharmacologic: Apixaban (Eliquis)  Mechanical VTE Prophylaxis in Place: No    Patient Centered Rounds: I have performed bedside rounds with nursing staff today  Discussions with Specialists or Other Care Team Provider:  Yes    Education and Discussions with Family / Patient:  Yes    Time Spent for Care: 20 minutes  More than 50% of total time spent on counseling and coordination of care as described above      Current Length of Stay: 12 day(s)    Current Patient Status: Inpatient   Certification Statement: The patient will continue to require additional inpatient hospital stay due to Acute congestive heart failure requiring diuresis    Discharge Plan: To home with outpatient physical therapy when medically ready    Code Status: Level 1 - Full Code      Subjective:   Patient denies any chest pain or shortness of breath or palpitations currently  Objective:     Vitals:   Temp (24hrs), Av 8 °F (36 6 °C), Min:97 4 °F (36 3 °C), Max:98 8 °F (37 1 °C)    Temp:  [97 4 °F (36 3 °C)-98 8 °F (37 1 °C)] 97 4 °F (36 3 °C)  HR:  [66-79] 67  Resp:  [18-20] 18  BP: (150-168)/(66-75) 168/74  SpO2:  [97 %-100 %] 97 %  Body mass index is 32 55 kg/m²  Input and Output Summary (last 24 hours): Intake/Output Summary (Last 24 hours) at 18 1008  Last data filed at 18 0900   Gross per 24 hour   Intake              360 ml   Output             3850 ml   Net            -3490 ml       Physical Exam:     Physical Exam   Constitutional: He is oriented to person, place, and time  He appears well-developed and well-nourished  No distress  HENT:   Head: Normocephalic and atraumatic  Eyes: Pupils are equal, round, and reactive to light  EOM are normal    Neck: JVD present  Cardiovascular: Normal rate, regular rhythm and normal heart sounds  Pulmonary/Chest: Effort normal and breath sounds normal    Abdominal: Soft  Bowel sounds are normal    Musculoskeletal: He exhibits edema  Neurological: He is alert and oriented to person, place, and time  Skin: He is not diaphoretic           Additional Data:     Labs:      Results from last 7 days  Lab Units 18  0541   WBC Thousand/uL 11 42*   HEMOGLOBIN g/dL 8 7*   HEMATOCRIT % 26 2*   PLATELETS Thousands/uL 425*   NEUTROS PCT % 70   LYMPHS PCT % 15   MONOS PCT % 7   EOS PCT % 7*       Results from last 7 days  Lab Units 18  0541   POTASSIUM mmol/L 3 6   CHLORIDE mmol/L 102   CO2 mmol/L 28   BUN mg/dL 28*   CREATININE mg/dL 1 22   ANION GAP mmol/L 9   CALCIUM mg/dL 8 8           Results from last 7 days  Lab Units 11/13/18  0729 11/12/18  2124 11/12/18  1612 11/12/18  1149 11/12/18  0728 11/11/18  2116 11/11/18  1618 11/11/18  1135 11/11/18  0742 11/10/18  2102 11/10/18  1607 11/10/18  1119   POC GLUCOSE mg/dl 162* 192* 192* 255* 156* 227* 163* 289* 183* 234* 218* 305*                   * I Have Reviewed All Lab Data Listed Above  * Additional Pertinent Lab Tests Reviewed: Jess 66 Admission Reviewed    Imaging:    Imaging Reports Reviewed Today Include:  None available  Imaging Personally Reviewed by Myself Includes:  None    Recent Cultures (last 7 days):           Last 24 Hours Medication List:     Current Facility-Administered Medications:  acetaminophen 650 mg Oral 4x Daily PRN Lesvia GoPAT   allopurinol 300 mg Oral Daily Genesis K ProsperofPAT mcneil   apixaban 5 mg Oral BID Rujul Hinds, DO   cholecalciferol 1,000 Units Oral Daily Genesis K PAT Ordonez   diltiazem 10 mg Intravenous Q6H PRN Genesis K PAT Ordonez   docusate sodium 100 mg Oral BID Genesis K PAT Ordonez   fluticasone 1 spray Each Nare Daily Azael Prechtel, DO   furosemide 80 mg Intravenous BID (diuretic) Rujul Hinds, DO   insulin lispro 1-6 Units Subcutaneous 4x Daily (AC & HS) Bobbi Quinones MD   levalbuterol 1 25 mg Nebulization Q6H PRN Edgar Miller MD   metoprolol tartrate 50 mg Oral Q12H [address] Riya Wilkinson MD   pravastatin 80 mg Oral Daily With Raymond Davis PAT Mcgregor   senna 2 tablet Oral HS PAT Olsen   sodium chloride 3 mL Nebulization Q6H PRN Edgar Miller MD   tamsulosin 0 4 mg Oral Daily With Dinner PAT Malhotra        Today, Patient Was Seen By: Edgar Miller MD    ** Please Note: Dictation voice to text software may have been used in the creation of this document   **

## 2018-11-14 PROBLEM — E11.22 TYPE 2 DIABETES MELLITUS WITH CHRONIC KIDNEY DISEASE, WITHOUT LONG-TERM CURRENT USE OF INSULIN (HCC): Status: ACTIVE | Noted: 2018-11-14

## 2018-11-14 PROBLEM — I10 ESSENTIAL HYPERTENSION: Status: ACTIVE | Noted: 2018-11-14

## 2018-11-14 PROBLEM — N18.30 CHRONIC KIDNEY DISEASE, STAGE III (MODERATE) (HCC): Status: ACTIVE | Noted: 2018-11-14

## 2018-11-14 LAB
ANION GAP SERPL CALCULATED.3IONS-SCNC: 10 MMOL/L (ref 4–13)
ATRIAL RATE: 71 BPM
BUN SERPL-MCNC: 27 MG/DL (ref 5–25)
CALCIUM SERPL-MCNC: 9.5 MG/DL (ref 8.3–10.1)
CHLORIDE SERPL-SCNC: 100 MMOL/L (ref 100–108)
CO2 SERPL-SCNC: 28 MMOL/L (ref 21–32)
CREAT SERPL-MCNC: 1.28 MG/DL (ref 0.6–1.3)
GFR SERPL CREATININE-BSD FRML MDRD: 58 ML/MIN/1.73SQ M
GLUCOSE SERPL-MCNC: 167 MG/DL (ref 65–140)
GLUCOSE SERPL-MCNC: 200 MG/DL (ref 65–140)
GLUCOSE SERPL-MCNC: 203 MG/DL (ref 65–140)
GLUCOSE SERPL-MCNC: 235 MG/DL (ref 65–140)
GLUCOSE SERPL-MCNC: 241 MG/DL (ref 65–140)
NT-PROBNP SERPL-MCNC: 877 PG/ML
P AXIS: 30 DEGREES
POTASSIUM SERPL-SCNC: 3.6 MMOL/L (ref 3.5–5.3)
PR INTERVAL: 158 MS
QRS AXIS: -56 DEGREES
QRSD INTERVAL: 176 MS
QT INTERVAL: 466 MS
QTC INTERVAL: 506 MS
SODIUM SERPL-SCNC: 138 MMOL/L (ref 136–145)
T WAVE AXIS: 94 DEGREES
VENTRICULAR RATE: 71 BPM

## 2018-11-14 PROCEDURE — 93010 ELECTROCARDIOGRAM REPORT: CPT | Performed by: INTERNAL MEDICINE

## 2018-11-14 PROCEDURE — 99232 SBSQ HOSP IP/OBS MODERATE 35: CPT | Performed by: INTERNAL MEDICINE

## 2018-11-14 PROCEDURE — 94660 CPAP INITIATION&MGMT: CPT

## 2018-11-14 PROCEDURE — 99232 SBSQ HOSP IP/OBS MODERATE 35: CPT | Performed by: FAMILY MEDICINE

## 2018-11-14 PROCEDURE — 80048 BASIC METABOLIC PNL TOTAL CA: CPT | Performed by: FAMILY MEDICINE

## 2018-11-14 PROCEDURE — G8978 MOBILITY CURRENT STATUS: HCPCS

## 2018-11-14 PROCEDURE — 97116 GAIT TRAINING THERAPY: CPT

## 2018-11-14 PROCEDURE — G8979 MOBILITY GOAL STATUS: HCPCS

## 2018-11-14 PROCEDURE — G8980 MOBILITY D/C STATUS: HCPCS

## 2018-11-14 PROCEDURE — 94760 N-INVAS EAR/PLS OXIMETRY 1: CPT

## 2018-11-14 PROCEDURE — 97110 THERAPEUTIC EXERCISES: CPT

## 2018-11-14 PROCEDURE — 82948 REAGENT STRIP/BLOOD GLUCOSE: CPT

## 2018-11-14 PROCEDURE — 83880 ASSAY OF NATRIURETIC PEPTIDE: CPT | Performed by: FAMILY MEDICINE

## 2018-11-14 RX ORDER — LOSARTAN POTASSIUM 50 MG/1
50 TABLET ORAL DAILY
Status: DISCONTINUED | OUTPATIENT
Start: 2018-11-14 | End: 2018-11-16 | Stop reason: HOSPADM

## 2018-11-14 RX ORDER — FUROSEMIDE 80 MG
80 TABLET ORAL DAILY
Status: DISCONTINUED | OUTPATIENT
Start: 2018-11-15 | End: 2018-11-16

## 2018-11-14 RX ADMIN — VITAMIN D, TAB 1000IU (100/BT) 1000 UNITS: 25 TAB at 09:55

## 2018-11-14 RX ADMIN — FUROSEMIDE 80 MG: 10 INJECTION, SOLUTION INTRAMUSCULAR; INTRAVENOUS at 09:56

## 2018-11-14 RX ADMIN — LOSARTAN POTASSIUM 50 MG: 50 TABLET ORAL at 14:18

## 2018-11-14 RX ADMIN — ALLOPURINOL 300 MG: 100 TABLET ORAL at 09:55

## 2018-11-14 RX ADMIN — TAMSULOSIN HYDROCHLORIDE 0.4 MG: 0.4 CAPSULE ORAL at 18:07

## 2018-11-14 RX ADMIN — PRAVASTATIN SODIUM 80 MG: 80 TABLET ORAL at 18:08

## 2018-11-14 RX ADMIN — INSULIN LISPRO 2 UNITS: 100 INJECTION, SOLUTION INTRAVENOUS; SUBCUTANEOUS at 09:55

## 2018-11-14 RX ADMIN — METOPROLOL TARTRATE 50 MG: 50 TABLET, FILM COATED ORAL at 21:29

## 2018-11-14 RX ADMIN — INSULIN LISPRO 3 UNITS: 100 INJECTION, SOLUTION INTRAVENOUS; SUBCUTANEOUS at 18:06

## 2018-11-14 RX ADMIN — INSULIN LISPRO 3 UNITS: 100 INJECTION, SOLUTION INTRAVENOUS; SUBCUTANEOUS at 12:24

## 2018-11-14 RX ADMIN — APIXABAN 5 MG: 5 TABLET, FILM COATED ORAL at 09:56

## 2018-11-14 RX ADMIN — INSULIN LISPRO 2 UNITS: 100 INJECTION, SOLUTION INTRAVENOUS; SUBCUTANEOUS at 21:29

## 2018-11-14 RX ADMIN — FLUTICASONE PROPIONATE 1 SPRAY: 50 SPRAY, METERED NASAL at 09:56

## 2018-11-14 RX ADMIN — SENNOSIDES 17.2 MG: 8.6 TABLET, FILM COATED ORAL at 21:29

## 2018-11-14 RX ADMIN — APIXABAN 5 MG: 5 TABLET, FILM COATED ORAL at 18:07

## 2018-11-14 RX ADMIN — AMLODIPINE BESYLATE 10 MG: 10 TABLET ORAL at 09:55

## 2018-11-14 RX ADMIN — METOPROLOL TARTRATE 50 MG: 50 TABLET, FILM COATED ORAL at 09:55

## 2018-11-14 NOTE — PROGRESS NOTES
Progress Note - Kimberley Lombardo 70 y o  male MRN: [id number]    Unit/Bed#: E4 -01 Encounter: [id number]  Subjective:   Still with edema  Perhaps somewhat above baseline  No chest pain or shortness of breath  No palpitations or lightheadedness  Objective:   Vitals: Blood pressure 164/70, pulse 73, temperature (!) 97 2 °F (36 2 °C), temperature source Tympanic, resp  rate 20, height 5' 10" (1 778 m), weight 99 7 kg (219 lb 12 8 oz), SpO2 97 %  ,Body mass index is 31 54 kg/m²  CMP:   Results from last 7 days  Lab Units 11/14/18  0512   SODIUM mmol/L 138   POTASSIUM mmol/L 3 6   CHLORIDE mmol/L 100   CO2 mmol/L 28   BUN mg/dL 27*   CREATININE mg/dL 1 28   CALCIUM mg/dL 9 5   EGFR ml/min/1 73sq m 58     probnp  877    Physical exam:  Lungs-somewhat decreased breath sounds  No wheezing rhonchi rales  Heart-regular with grade 2 systolic murmur at the base  No diastolic murmur rub or gallop  Abdomen-obese  Nontender without mass organomegaly  Extremities-2+ lower extremity edema with absent distal pulses    Assessment:  1  Complete heart block with shock on presentation November 1  Status post permanent pacemaker  2  Acute renal failure due to low flow  Resolved  3  Paroxysmal atrial fibrillation  Now sinus rhythm  On Eliquis and back on usual dose of beta-blocker  4  Accelerated hypertension  Patient on amlodipine, losartan and metoprolol at home  Now on amlodipine and metoprolol  5  Acute diastolic heart failure-improving  On IV Lasix 80 mg b i d  Uses p r n  Lasix at home for edema  6  Hyperlipidemia-on statin therapy-simvastatin at home  On pravastatin here  7   Diabetes mellitus    Plan:  Switch to PO lasix 80 daily  Continue amlodipine and metoprolol  Restart losartan at 50 mg daily watching renal fx closely  Continue eliquis  Hopefully DC in 2 days

## 2018-11-14 NOTE — PLAN OF CARE
Problem: PHYSICAL THERAPY ADULT  Goal: Performs mobility at highest level of function for planned discharge setting  See evaluation for individualized goals  Treatment/Interventions: Functional transfer training, LE strengthening/ROM, Elevations, Therapeutic exercise, Endurance training, Patient/family training, Equipment eval/education, Bed mobility, Gait training, Compensatory technique education, Continued evaluation, Spoke to nursing, OT, Family  Equipment Recommended: Yahaira Rosario (RW   Monitor )       See flowsheet documentation for full assessment, interventions and recommendations  Outcome: Completed Date Met: 11/14/18  Prognosis: Good  Problem List: Decreased endurance, Impaired balance, Impaired sensation, Decreased strength, Decreased mobility  Assessment: PT progressed to performing transfers with I  Trialed ambulation without use of an AD on levels  Pt demonstrates steady gait on levels, fluent reciprocal gait pattern  NO unsteadiness or LOB and safe mobility without use of an AD Pt ambulates  I'ly on levels without device  Progressed Pt to using one rail with stair climbing with mod I assist  Pt demonstrates safe transfer, ambulation and stair climbing techniques  Pt progressed with ambulation distances to 385'  Pt performed higher level balance activities with unsteadiness and lob noted with forward marching, and front cross overs requiring min assist and or unilateral ue support of counter  PT has achieved inpt PT goals no further inpt PT required at this time  Pt encouraged to ambulate on unit with family ,staff or alone with or without Rw depending of fatigue level and balance    Recommend d/c to home with family support and OPPT transitioning to lifestyle management program  Barriers to Discharge: Inaccessible home environment (2nd floor shower)     Recommendation: Home with family support, Outpatient PT (OPPt transitioning to lifestyle management program  )     PT - OK to Discharge: Yes    See flowsheet documentation for full assessment

## 2018-11-14 NOTE — ASSESSMENT & PLAN NOTE
No results found for: HGBA1C    Recent Labs      11/13/18   1157  11/13/18   1615  11/13/18 2027 11/14/18   0724   POCGLU  247*  203*  231*  200*       Blood Sugar Average: Last 72 hrs:  (P) 980 [id number]     Continue with sliding scale insulin, diabetic diet, Accu-Cheks q i d   Can resume metformin and Amaryl upon discharge

## 2018-11-14 NOTE — PHYSICAL THERAPY NOTE
Physical Therapy Treatment Note       11/14/18 1547   Pain Assessment   Pain Assessment No/denies pain   Restrictions/Precautions   Other Precautions Fall Risk;Telemetry   General   Chart Reviewed Yes   Family/Caregiver Present Yes  (wife)   Cognition   Overall Cognitive Status WFL   Arousal/Participation Alert; Cooperative   Attention Within functional limits   Orientation Level Oriented X4   Memory Within functional limits   Following Commands Follows all commands and directions without difficulty   Subjective   Subjective "Pt ofers no c/o pain or discomfort  Pt reports feeling good   " I'm going home Friday "     Transfers   Sit to Stand 7  Independent   Stand to Sit 7  Independent   Ambulation/Elevation   Gait pattern WNL   Gait Assistance 7  Independent   Assistive Device None   Distance 554'G1,381' x1   Stair Management Assistance 6  Modified independent   Additional items Assist x 1;Verbal cues   Stair Management Technique One rail L;Foreward;Nonreciprocal   Number of Stairs 12   Balance   Static Sitting Normal   Dynamic Sitting Normal   Static Standing Normal   Dynamic Standing Good   Ambulatory Good   Activity Tolerance   Activity Tolerance Patient tolerated treatment well   Nurse Made Aware Teri DAMICO   Exercises   Balance training  Pt performed higher level balance activities of forward marching, backward walking, fron cross overs l & R, x 15'x2 without AD with supervision to min assist    object retieval from standing with supervision  Assessment   Prognosis Good   Problem List Decreased endurance; Impaired balance; Impaired sensation;Decreased strength;Decreased mobility   Assessment PT progressed to performing transfers with I  Trialed ambulation without use of an AD on levels  Pt demonstrates steady gait on levels, fluent reciprocal gait pattern  NO unsteadiness or LOB and safe mobility without use of an AD Pt ambulates  I'ly on levels without device    Progressed Pt to using one rail with stair climbing with mod I assist  Pt demonstrates safe transfer, ambulation and stair climbing techniques  Pt progressed with ambulation distances to 385'  Pt performed higher level balance activities with unsteadiness and lob noted with forward marching, and front cross overs requiring min assist and or unilateral ue support of counter  PT has achieved inpt PT goals no further inpt PT required at this time  Pt encouraged to ambulate on unit with family ,staff or alone with or without Rw depending of fatigue level and balance  Recommend d/c to home with family support and OPPT transitioning to lifestyle management program   Goals   Patient Goals " to go home Friday "    STG Expiration Date 11/18/18   Treatment Day 3   Plan   Treatment/Interventions Functional transfer training;LE strengthening/ROM; Elevations; Therapeutic exercise; Endurance training;Patient/family training;Equipment eval/education; Bed mobility;Gait training;Spoke to nursing   Progress Discontinue PT   PT Frequency (d/c inpt PT )   Recommendation   Recommendation Home with family support; Outpatient PT  (OPPt transitioning to lifestyle management program  )   PT - OK to Discharge Yes       Isaias Ashton, PTA

## 2018-11-14 NOTE — PROGRESS NOTES
Progress Note - Bill Schuler 1955, 70 y o  male MRN: [id number]    Unit/Bed#: E4 -01 Encounter: [id number]    Primary Care Provider: Milton Santiago DO   Date and time admitted to hospital: 11/1/2018 12:10 PM        * Complete heart block [facility])   Assessment & Plan    Presented with complete heart block  sufferred cardiac arrest in ER  Was intubated and resuscitated  Received pacer  Now tachycardic with rapid atrial fibrillation  Cardiology following-appreciate their consult and recommendations  Acute respiratory failure with hypoxia [facility])   Assessment & Plan    Still on IV lasix with acute pulmonary edema  Cardiology following patient  Acute diastolic (congestive) heart failure (HCC)   Assessment & Plan    With acute pulmonary edema  Continue diuresis with Lasix 80 mg IV BID  Cardiology continues to follow patient  Continue with daily weight, strict Is&Os-net fluid loss 2940mL  Low-salt diet  Paroxysmal atrial fibrillation (HCC)   Assessment & Plan    With rapid ventricular response  Continue controlling rate with metoprolol  Continue apixaban for anticoagulation  Type 2 diabetes mellitus with chronic kidney disease, without long-term current use of insulin [facility])   Assessment & Plan    No results found for: HGBA1C    Recent Labs      11/13/18   1157  11/13/18   1615  11/13/18   2027  11/14/18   0724   POCGLU  247*  203*  231*  200*       Blood Sugar Average: Last 72 hrs:  (P) 575 [id number]     Continue with sliding scale insulin, diabetic diet, Accu-Cheks q i d   Can resume metformin and Amaryl upon discharge  Obstructive sleep apnea   Assessment & Plan    Suspected  Dr Sharifa Mcpherson would like the patient to get an outpatient sleep study ASAP  Essential hypertension   Assessment & Plan    Continue Norvasc 10 mg daily and Lopressor 50 mg BID  Chronic kidney disease, stage III (moderate) ([facility])   Assessment & Plan    Current creatinine 1 28  EGFR 58    Avoid nephrotoxins, hypotension, NSAIDs, anemia  VTE Pharmacologic Prophylaxis:   Pharmacologic: Apixaban (Eliquis)  Mechanical VTE Prophylaxis in Place: No    Patient Centered Rounds: I have performed bedside rounds with nursing staff today  Discussions with Specialists or Other Care Team Provider:  Yes    Education and Discussions with Family / Patient:  Yes    Time Spent for Care: 20 minutes  More than 50% of total time spent on counseling and coordination of care as described above  Current Length of Stay: 13 day(s)    Current Patient Status: Inpatient   Certification Statement: The patient will continue to require additional inpatient hospital stay due to Acute CHF requiring diuresis  Discharge Plan: To be determined    Code Status: Level 1 - Full Code      Subjective:   Patient denies any shortness of breath or chest pain  Objective:     Vitals:   Temp (24hrs), Av 7 °F (36 5 °C), Min:97 2 °F (36 2 °C), Max:98 4 °F (36 9 °C)    Temp:  [97 2 °F (36 2 °C)-98 4 °F (36 9 °C)] 97 2 °F (36 2 °C)  HR:  [62-73] 73  Resp:  [18-20] 20  BP: (151-173)/(69-76) 164/70  SpO2:  [93 %-100 %] 97 %  Body mass index is 31 54 kg/m²  Input and Output Summary (last 24 hours): Intake/Output Summary (Last 24 hours) at 18 1055  Last data filed at 18 1001   Gross per 24 hour   Intake              300 ml   Output             2250 ml   Net            -1950 ml       Physical Exam:     Physical Exam   Constitutional: He is oriented to person, place, and time  He appears well-developed and well-nourished  No distress  HENT:   Head: Normocephalic  Eyes: Pupils are equal, round, and reactive to light  EOM are normal    Neck: Normal range of motion  Neck supple  Cardiovascular: Normal rate and normal heart sounds  Irregular rhythm  Pulmonary/Chest: Effort normal and breath sounds normal  No respiratory distress  He has no wheezes  Abdominal: Soft   Bowel sounds are normal  He exhibits no distension  There is no tenderness  Musculoskeletal: Normal range of motion  He exhibits no edema or tenderness  Neurological: He is alert and oriented to person, place, and time  Skin: He is not diaphoretic  Additional Data:     Labs:      Results from last 7 days  Lab Units 11/13/18  0541   WBC Thousand/uL 11 42*   HEMOGLOBIN g/dL 8 7*   HEMATOCRIT % 26 2*   PLATELETS Thousands/uL 425*   NEUTROS PCT % 70   LYMPHS PCT % 15   MONOS PCT % 7   EOS PCT % 7*       Results from last 7 days  Lab Units 11/14/18  0512   POTASSIUM mmol/L 3 6   CHLORIDE mmol/L 100   CO2 mmol/L 28   BUN mg/dL 27*   CREATININE mg/dL 1 28   ANION GAP mmol/L 10   CALCIUM mg/dL 9 5           Results from last 7 days  Lab Units 11/14/18  0724 11/13/18  2027 11/13/18  1615 11/13/18  1157 11/13/18  0729 11/12/18  2124 11/12/18  1612 11/12/18  1149 11/12/18  0728 11/11/18  2116 11/11/18  1618 11/11/18  1135   POC GLUCOSE mg/dl 200* 231* 203* 247* 162* 192* 192* 255* 156* 227* 163* 289*                   * I Have Reviewed All Lab Data Listed Above  * Additional Pertinent Lab Tests Reviewed:  Jess 66 Admission Reviewed    Imaging:    Imaging Reports Reviewed Today Include:  None available  Imaging Personally Reviewed by Myself Includes:  None    Recent Cultures (last 7 days):           Last 24 Hours Medication List:     Current Facility-Administered Medications:  acetaminophen 650 mg Oral 4x Daily PRN PAT Burr   allopurinol 300 mg Oral Daily Genesis K PAT Ordonez   amLODIPine 10 mg Oral Daily Larry Jarvis MD   apixaban 5 mg Oral BID Rujul Hinds, DO   cholecalciferol 1,000 Units Oral Daily Neomia Frances BilPAT levy   diltiazem 10 mg Intravenous Q6H PRN Neomia Frances BilofPAT mcneil   docusate sodium 100 mg Oral BID Genesis K BilofskPAT granda   fluticasone 1 spray Each Nare Daily Azael Prechtel, DO   furosemide 80 mg Intravenous BID (diuretic) Rujul Hinds, DO   insulin lispro 1-6 Units Subcutaneous 4x Daily (AC & HS) Tara Mcdonald MD   levalbuterol 1 25 mg Nebulization Q6H PRN Tami Alaniz MD   metoprolol tartrate 50 mg Oral Q12H [address] Vinnie Sinha MD   pravastatin 80 mg Oral Daily With PAT Daniels   senna 2 tablet Oral HS Genesis K PAT Ordonez   sodium chloride 3 mL Nebulization Q6H PRN Tami Alaniz MD   tamsulosin 0 4 mg Oral Daily With Dinner PAT Key        Today, Patient Was Seen By: Tami Alaniz MD    ** Please Note: Dictation voice to text software may have been used in the creation of this document   **

## 2018-11-15 LAB
ANION GAP SERPL CALCULATED.3IONS-SCNC: 10 MMOL/L (ref 4–13)
BUN SERPL-MCNC: 31 MG/DL (ref 5–25)
CALCIUM SERPL-MCNC: 8.9 MG/DL (ref 8.3–10.1)
CHLORIDE SERPL-SCNC: 101 MMOL/L (ref 100–108)
CO2 SERPL-SCNC: 26 MMOL/L (ref 21–32)
CREAT SERPL-MCNC: 1.37 MG/DL (ref 0.6–1.3)
GFR SERPL CREATININE-BSD FRML MDRD: 54 ML/MIN/1.73SQ M
GLUCOSE SERPL-MCNC: 177 MG/DL (ref 65–140)
GLUCOSE SERPL-MCNC: 195 MG/DL (ref 65–140)
GLUCOSE SERPL-MCNC: 225 MG/DL (ref 65–140)
GLUCOSE SERPL-MCNC: 233 MG/DL (ref 65–140)
GLUCOSE SERPL-MCNC: 258 MG/DL (ref 65–140)
POTASSIUM SERPL-SCNC: 3.6 MMOL/L (ref 3.5–5.3)
SODIUM SERPL-SCNC: 137 MMOL/L (ref 136–145)

## 2018-11-15 PROCEDURE — 94660 CPAP INITIATION&MGMT: CPT

## 2018-11-15 PROCEDURE — G8989 SELF CARE D/C STATUS: HCPCS

## 2018-11-15 PROCEDURE — 80048 BASIC METABOLIC PNL TOTAL CA: CPT | Performed by: INTERNAL MEDICINE

## 2018-11-15 PROCEDURE — 99232 SBSQ HOSP IP/OBS MODERATE 35: CPT | Performed by: INTERNAL MEDICINE

## 2018-11-15 PROCEDURE — 99232 SBSQ HOSP IP/OBS MODERATE 35: CPT | Performed by: FAMILY MEDICINE

## 2018-11-15 PROCEDURE — 97535 SELF CARE MNGMENT TRAINING: CPT

## 2018-11-15 PROCEDURE — 97530 THERAPEUTIC ACTIVITIES: CPT

## 2018-11-15 PROCEDURE — 82948 REAGENT STRIP/BLOOD GLUCOSE: CPT

## 2018-11-15 RX ADMIN — TAMSULOSIN HYDROCHLORIDE 0.4 MG: 0.4 CAPSULE ORAL at 17:13

## 2018-11-15 RX ADMIN — METOPROLOL TARTRATE 50 MG: 50 TABLET, FILM COATED ORAL at 21:36

## 2018-11-15 RX ADMIN — FUROSEMIDE 80 MG: 80 TABLET ORAL at 08:15

## 2018-11-15 RX ADMIN — VITAMIN D, TAB 1000IU (100/BT) 1000 UNITS: 25 TAB at 08:15

## 2018-11-15 RX ADMIN — LOSARTAN POTASSIUM 50 MG: 50 TABLET ORAL at 08:15

## 2018-11-15 RX ADMIN — SENNOSIDES 17.2 MG: 8.6 TABLET, FILM COATED ORAL at 21:36

## 2018-11-15 RX ADMIN — PRAVASTATIN SODIUM 80 MG: 80 TABLET ORAL at 17:13

## 2018-11-15 RX ADMIN — AMLODIPINE BESYLATE 10 MG: 10 TABLET ORAL at 08:15

## 2018-11-15 RX ADMIN — METOPROLOL TARTRATE 50 MG: 50 TABLET, FILM COATED ORAL at 08:15

## 2018-11-15 RX ADMIN — ALLOPURINOL 300 MG: 100 TABLET ORAL at 08:15

## 2018-11-15 RX ADMIN — INSULIN LISPRO 2 UNITS: 100 INJECTION, SOLUTION INTRAVENOUS; SUBCUTANEOUS at 09:13

## 2018-11-15 RX ADMIN — APIXABAN 5 MG: 5 TABLET, FILM COATED ORAL at 08:15

## 2018-11-15 RX ADMIN — APIXABAN 5 MG: 5 TABLET, FILM COATED ORAL at 17:13

## 2018-11-15 RX ADMIN — INSULIN LISPRO 3 UNITS: 100 INJECTION, SOLUTION INTRAVENOUS; SUBCUTANEOUS at 17:13

## 2018-11-15 RX ADMIN — INSULIN LISPRO 2 UNITS: 100 INJECTION, SOLUTION INTRAVENOUS; SUBCUTANEOUS at 21:36

## 2018-11-15 RX ADMIN — FLUTICASONE PROPIONATE 1 SPRAY: 50 SPRAY, METERED NASAL at 08:15

## 2018-11-15 RX ADMIN — INSULIN LISPRO 3 UNITS: 100 INJECTION, SOLUTION INTRAVENOUS; SUBCUTANEOUS at 12:59

## 2018-11-15 NOTE — ASSESSMENT & PLAN NOTE
With acute pulmonary edema  Continue diuresis with Lasix 80 mg PO daily  Cardiology also re-dosing metolazone  Cardiology continues to follow patient  Patient's current weight is now below admission weight,  strict Is&Os-net fluid loss 560mL  Low-salt diet

## 2018-11-15 NOTE — PLAN OF CARE
Problem: OCCUPATIONAL THERAPY ADULT  Goal: Performs self-care activities at highest level of function for planned discharge setting  See evaluation for individualized goals  Treatment Interventions: ADL retraining, Functional transfer training, UE strengthening/ROM, Endurance training, Patient/family training, Equipment evaluation/education, Compensatory technique education, Energy conservation, Activityengagement          See flowsheet documentation for full assessment, interventions and recommendations  Outcome: Completed Date Met: 11/15/18  Limitation: Decreased ADL status, Decreased UE ROM, Decreased UE strength, Decreased endurance, Decreased self-care trans, Decreased high-level ADLs  Prognosis: Good  Assessment: Pt seen for OT treatment session this AM focusing on functional activity tolerance, ADLs, energy conservation education, and functional mobility/transfers  Pt alert and cooperative throughout session  Pt seated OOB in chair at start of session  Transfers (sit<>stand, to and from standard toilet) completed at an Independent level with good safety awareness demonstrated and good carryover of previous education regarding previous techniques  Pt progressed to Independent in functional mobility without use of AD, demonstrating Normal dynamic standing balance  ADLs completed while seated OOB in chair  Pt demonstrating ability to complete dressing (UB and LB), Grooming, and Toileting tasks at an Independent level with no LOBs noted during all tasks while seated/standing  Additionally, pt able to recall energy conservation techniques from previous session w/o cues from therapist  Pt seated OOB in chair at end of session with call bell and phone within reach  All needs met and pt reports no further questions for OT at this time  Pt has met all OT LTGs at this time, demonstrating independence in ADLs, IADLs, and functional mobility/transfers   Pt with no concerns regarding returning home and completing daily routine and feeling ready to return home  Therefore, discussed with pt D/Cing from OT and pt agreed  Will D/C pt from OT caseload at this time  Please re-consult if needed  Recommend Home with family support        OT Discharge Recommendation: Home with family support  OT - OK to Discharge: Yes (when medically cleared)

## 2018-11-15 NOTE — PROGRESS NOTES
Progress Note - Cardiology   Manuelito Handley 70 y o  male MRN: [id number]  Unit/Bed#: E4 -01 Encounter: [id number]      Assessment/Recommendations/Discussion:   1  Complete heart block status post permanent pacemaker  2  New onset atrial fibrillation-now sinus  3  Acute diastolic congestive heart failure with volume overload  4  Acute renal failure-resolved  5  Diabetes  6  Hypertension  7  Mild aortic stenosis and likely non severe mitral valve stenosis  8  Dyslipidemia    · Now on p o  Lasix  Will re-dose metolazone today  Anticipate discharge tomorrow  Although still edematous, weight now below admission weight    · Blood pressure controlled, continue metoprolol, amlodipine  · Tolerating apixaban, continue the same  · Follow-up with Dr Dioni Del Valle after discharge        Subjective:  Patient seen and examined, feels well, wants to go home      Physical Exam:  GEN:  NAD  HEENT:  MMM, NCAT, pink conjunctiva, EOMI, nonicteric sclera  CV:  NO JVD/HJR, RR, NO M/R/G, +S1/S2, NO PARASTERNAL HEAVE/THRILL, NO LE EDEMA, NO HEPATIC SYSTOLIC PULSATION, WARM EXTREMITIES  RESP:  +++  ABD:  SOFT, NT, NO GROSS ORGANOMEGALY        Vitals:   /61 (BP Location: Right arm)   Pulse 84   Temp 97 8 °F (36 6 °C) (Tympanic)   Resp 18   Ht 5' 10" (1 778 m)   Wt 99 1 kg (218 lb 7 6 oz)   SpO2 98%   BMI 31 35 kg/m²   Vitals:    11/14/18 0552 11/15/18 0600   Weight: 99 7 kg (219 lb 12 8 oz) 99 1 kg (218 lb 7 6 oz)       Intake/Output Summary (Last 24 hours) at 11/15/18 1217  Last data filed at 11/15/18 0903   Gross per 24 hour   Intake              240 ml   Output             1200 ml   Net             -960 ml       TELEMETRY: off  Lab Results:    Results from last 7 days  Lab Units 11/13/18  0541   WBC Thousand/uL 11 42*   HEMOGLOBIN g/dL 8 7*   HEMATOCRIT % 26 2*   PLATELETS Thousands/uL 425*       Results from last 7 days  Lab Units 11/15/18  0615   POTASSIUM mmol/L 3 6   CHLORIDE mmol/L 101   CO2 mmol/L 26   BUN mg/dL 31* CREATININE mg/dL 1 37*   CALCIUM mg/dL 8 9       Results from last 7 days  Lab Units 11/15/18  0615   POTASSIUM mmol/L 3 6   CHLORIDE mmol/L 101   CO2 mmol/L 26   BUN mg/dL 31*   CREATININE mg/dL 1 37*   CALCIUM mg/dL 8 9           Medications:    Current Facility-Administered Medications:     acetaminophen (TYLENOL) tablet 650 mg, 650 mg, Oral, 4x Daily PRN, DonPAT Espinoza, 650 mg at 11/10/18 1828    allopurinol (ZYLOPRIM) tablet 300 mg, 300 mg, Oral, Daily, Noy PAT Love, 300 mg at 11/15/18 0815    amLODIPine (NORVASC) tablet 10 mg, 10 mg, Oral, Daily, Emperatriz Webb MD, 10 mg at 11/15/18 0815    apixaban (ELIQUIS) tablet 5 mg, 5 mg, Oral, BID, Rujul Hinds, DO, 5 mg at 11/15/18 0815    cholecalciferol (VITAMIN D3) tablet 1,000 Units, 1,000 Units, Oral, Daily, PAT Dobbins, 1,000 Units at 11/15/18 0815    diltiazem (CARDIZEM) injection 10 mg, 10 mg, Intravenous, Q6H PRN, PAT Dobbins, 10 mg at 11/08/18 0453    docusate sodium (COLACE) capsule 100 mg, 100 mg, Oral, BID, NoyPAT Nova, 100 mg at 11/13/18 0807    fluticasone (FLONASE) 50 mcg/act nasal spray 1 spray, 1 spray, Each Nare, Daily, Azael Griffin, DO, 1 spray at 11/15/18 0815    furosemide (LASIX) tablet 80 mg, 80 mg, Oral, Daily, Harsha Tran MD, 80 mg at 11/15/18 0815    insulin lispro (HumaLOG) 100 units/mL subcutaneous injection 1-6 Units, 1-6 Units, Subcutaneous, 4x Daily (AC & HS), 2 Units at 11/15/18 0913 **AND** Fingerstick Glucose (POCT), , , 4x Daily AC and at bedtime, Rowan Navarrete MD    levalbuterol [facility]) inhalation solution 1 25 mg, 1 25 mg, Nebulization, Q6H PRN, Thuy Andino MD    losartan (COZAAR) tablet 50 mg, 50 mg, Oral, Daily, Harsha Tran MD, 50 mg at 11/15/18 0815    metoprolol tartrate (LOPRESSOR) tablet 50 mg, 50 mg, Oral, Q12H [address], Harsha Tran MD, 50 mg at 11/15/18 0815    pravastatin (PRAVACHOL) tablet 80 mg, 80 mg, Oral, Daily With Dinner, PAT Tinsley, 80 mg at 11/14/18 1808    senna (SENOKOT) tablet 17 2 mg, 2 tablet, Oral, HS, [place] PAT Eaton, 17 2 mg at 11/14/18 2129    sodium chloride 0 9 % inhalation solution 3 mL, 3 mL, Nebulization, Q6H PRN, Nighat Quintero MD    tamsulosin (FLOMAX) capsule 0 4 mg, 0 4 mg, Oral, Daily With Dinner, PAT Kelley, 0 4 mg at 11/14/18 1807    This note was completed in part utilizing [employer] Software  Grammatical errors, random word insertions, spelling mistakes, and incomplete sentences may be an occasional consequence of this system secondary to software limitations, ambient noise, and hardware issues  If you have any questions or concerns about the content, text, or information contained within the body of this dictation, please contact the provider for clarification

## 2018-11-15 NOTE — ASSESSMENT & PLAN NOTE
Presented with complete heart block  sufferred cardiac arrest in ER  Was intubated and resuscitated  Received pacer  Now tachycardic with rapid atrial fibrillation  Cardiology following-appreciate their consult and recommendations  Follow-up with Dr Monge upon discharge  Possible discharge to home tomorrow

## 2018-11-15 NOTE — SOCIAL WORK
HEART FAILURE CARE COORDINATOR: Briefly met with patient to discuss lifestyle changes and monitoring for Heart failure signs and symptoms at home  I will continue to follow as an inpatient and telephonically if needed on discharge

## 2018-11-15 NOTE — ASSESSMENT & PLAN NOTE
No results found for: HGBA1C    Recent Labs      11/14/18   1529  11/14/18   2118  11/15/18   0744  11/15/18   1130   POCGLU  235*  203*  195*  233*       Blood Sugar Average: Last 72 hrs:  (P) 210 [id number]     Continue with sliding scale insulin, diabetic diet, Accu-Cheks q i d   Can resume metformin and Amaryl upon discharge

## 2018-11-15 NOTE — PROGRESS NOTES
Progress Note - Kimberley Lombardo 1955, 70 y o  male MRN: [id number]    Unit/Bed#: E4 -01 Encounter: [id number]    Primary Care Provider: Joann Cotto DO   Date and time admitted to hospital: 11/1/2018 12:10 PM        * Complete heart block [facility])   Assessment & Plan    Presented with complete heart block  sufferred cardiac arrest in ER  Was intubated and resuscitated  Received pacer  Now tachycardic with rapid atrial fibrillation  Cardiology following-appreciate their consult and recommendations  Follow-up with Dr Monge upon discharge  Possible discharge to home tomorrow  Acute respiratory failure with hypoxia ([address] )   Assessment & Plan    Now on oral Lasix with re-dosing of metolazone  Cardiology following patient  Acute diastolic (congestive) heart failure (HCC)   Assessment & Plan    With acute pulmonary edema  Continue diuresis with Lasix 80 mg PO daily  Cardiology also re-dosing metolazone  Cardiology continues to follow patient  Patient's current weight is now below admission weight,  strict Is&Os-net fluid loss 560mL  Low-salt diet  Paroxysmal atrial fibrillation (HCC)   Assessment & Plan    With rapid ventricular response  Continue controlling rate with metoprolol  Continue apixaban for anticoagulation  Type 2 diabetes mellitus with chronic kidney disease, without long-term current use of insulin [facility])   Assessment & Plan    No results found for: HGBA1C    Recent Labs      11/14/18   1529  11/14/18   2118  11/15/18   0744  11/15/18   1130   POCGLU  235*  203*  195*  233*       Blood Sugar Average: Last 72 hrs:  (P) 210 [id number]     Continue with sliding scale insulin, diabetic diet, Accu-Cheks q i d   Can resume metformin and Amaryl upon discharge  Obstructive sleep apnea   Assessment & Plan    Suspected  Dr Anna Nunes would like the patient to get an outpatient sleep study ASAP       Essential hypertension   Assessment & Plan    Continue Norvasc 10 mg daily and Lopressor 50 mg BID  Chronic kidney disease, stage III (moderate) (HCC)   Assessment & Plan    Current creatinine 1 37  EGFR 58  Avoid nephrotoxins, hypotension, NSAIDs, anemia  VTE Pharmacologic Prophylaxis:   Pharmacologic: Apixaban (Eliquis)  Mechanical VTE Prophylaxis in Place: No    Patient Centered Rounds: I have performed bedside rounds with nursing staff today  Discussions with Specialists or Other Care Team Provider:  Yes    Education and Discussions with Family / Patient:  Yes    Time Spent for Care: 20 minutes  More than 50% of total time spent on counseling and coordination of care as described above  Current Length of Stay: 14 day(s)    Current Patient Status: Inpatient   Certification Statement: The patient will continue to require additional inpatient hospital stay due to Acute CHF requiring diuresis    Discharge Plan: To home with outpatient PT possibly tomorrow  Code Status: Level 1 - Full Code      Subjective:   Patient denies any shortness of breath, chest pain, palpitations  Objective:     Vitals:   Temp (24hrs), Av 7 °F (36 5 °C), Min:96 9 °F (36 1 °C), Max:98 5 °F (36 9 °C)    Temp:  [96 9 °F (36 1 °C)-98 5 °F (36 9 °C)] 97 8 °F (36 6 °C)  HR:  [60-84] 84  Resp:  [18-20] 18  BP: (122-169)/(61-71) 122/61  SpO2:  [97 %-100 %] 98 %  Body mass index is 31 35 kg/m²  Input and Output Summary (last 24 hours): Intake/Output Summary (Last 24 hours) at 11/15/18 1426  Last data filed at 11/15/18 0903   Gross per 24 hour   Intake              240 ml   Output             1200 ml   Net             -960 ml       Physical Exam:     Physical Exam   Constitutional: He is oriented to person, place, and time  He appears well-developed and well-nourished  No distress  HENT:   Head: Normocephalic and atraumatic  Eyes: Pupils are equal, round, and reactive to light  EOM are normal    Cardiovascular: Normal rate, regular rhythm and normal heart sounds      No murmur heard   Pulmonary/Chest: Effort normal and breath sounds normal  No respiratory distress  He has no wheezes  Abdominal: Soft  Bowel sounds are normal    Musculoskeletal: He exhibits no edema  Neurological: He is alert and oriented to person, place, and time  Skin: He is not diaphoretic  Psychiatric: He has a normal mood and affect  His behavior is normal          Additional Data:     Labs:      Results from last 7 days  Lab Units 11/13/18  0541   WBC Thousand/uL 11 42*   HEMOGLOBIN g/dL 8 7*   HEMATOCRIT % 26 2*   PLATELETS Thousands/uL 425*   NEUTROS PCT % 70   LYMPHS PCT % 15   MONOS PCT % 7   EOS PCT % 7*       Results from last 7 days  Lab Units 11/15/18  0615   POTASSIUM mmol/L 3 6   CHLORIDE mmol/L 101   CO2 mmol/L 26   BUN mg/dL 31*   CREATININE mg/dL 1 37*   ANION GAP mmol/L 10   CALCIUM mg/dL 8 9           Results from last 7 days  Lab Units 11/15/18  1130 11/15/18  0744 11/14/18  2118 11/14/18  1529 11/14/18  1149 11/14/18  0724 11/13/18  2027 11/13/18  1615 11/13/18  1157 11/13/18  0729 11/12/18  2124 11/12/18  1612   POC GLUCOSE mg/dl 233* 195* 203* 235* 241* 200* 231* 203* 247* 162* 192* 192*                   * I Have Reviewed All Lab Data Listed Above  * Additional Pertinent Lab Tests Reviewed:  [facility]    Imaging:    Imaging Reports Reviewed Today Include:  None available  Imaging Personally Reviewed by Myself Includes:  None    Recent Cultures (last 7 days):           Last 24 Hours Medication List:     Current Facility-Administered Medications:  acetaminophen 650 mg Oral 4x Daily PRN PAT Post   allopurinol 300 mg Oral Daily Genesis K ProsperofPAT mcneil   amLODIPine 10 mg Oral Daily Theresa Sharpe MD   apixaban 5 mg Oral BID Bean Hinds DO   cholecalciferol 1,000 Units Oral Daily PAT Tellez   diltiazem 10 mg Intravenous Q6H PRN Genesis K BilofskPAT granda   docusate sodium 100 mg Oral BID Genesis K ProsperofPAT mcneil   fluticasone 1 spray Each Nare Daily Azael Griffin DO   furosemide 80 mg Oral Daily Katherine Pro MD   insulin lispro 1-6 Units Subcutaneous 4x Daily (AC & HS) Clement Sewell MD   levalbuterol 1 25 mg Nebulization Q6H PRN Gabby Stewart MD   losartan 50 mg Oral Daily Katherine Pro MD   metoprolol tartrate 50 mg Oral Q12H [address] Katherine Pro MD   pravastatin 80 mg Oral Daily With PAT Daniels   senna 2 tablet Oral HS Genesis K PAT Ordonez   sodium chloride 3 mL Nebulization Q6H PRN Gabby Stewart MD   tamsulosin 0 4 mg Oral Daily With Dinner PAT Bowling        Today, Patient Was Seen By: Gabby Stewart MD    ** Please Note: Dictation voice to text software may have been used in the creation of this document   **

## 2018-11-15 NOTE — OCCUPATIONAL THERAPY NOTE
[address] Progress Note     Patient Name: Shane Welch  SJXNR'Y Date: 11/15/2018  Problem List  Patient Active Problem List   Diagnosis    Complete heart block (HCC)    Metabolic acidosis    Idiopathic hypotension    Other hyperlipidemia    Sepsis ([facility] )    Paroxysmal atrial fibrillation ([facility] )    Acute respiratory failure with hypoxia (HCC)    Persistent proteinuria    Volume overload    Urinary retention    Third degree heart block (HCC)    Obstructive sleep apnea    Acute diastolic (congestive) heart failure (HCC)    Type 2 diabetes mellitus with chronic kidney disease, without long-term current use of insulin (HCC)    Essential hypertension    Chronic kidney disease, stage III (moderate) ([place] [address] )           11/15/18 0903   Restrictions/Precautions   Weight Bearing Precautions Per Order No   Other Precautions Fall Risk;Telemetry   Lifestyle   Autonomy At baseline, pt was I w/ ADLs, IADLs, and functional mobility/transfers w/o use of AD, (+) , and reports 0 falls PTA  Reciprocal Relationships Lives with spouse, children, and 4 grandchildren   Service to Others Retired- [address] for 40 years   Intrinsic Gratification Spending time with family   Pain Assessment   Pain Assessment No/denies pain   Pain Score No Pain   ADL   Where Assessed Chair  (and standing at sink)   Grooming Assistance 7  Independent   Grooming Comments standing at sink   [place] 7  Independent   LB Dressing Comments Don/doff B/L socks, thread BLEs into pants, and pull pants over hips;  No LOB noted   Toileting Assistance  7  Independent   Toileting Comments Good standing balance demonstrated during clothing management up/down   Functional Standing Tolerance   Time 10 mins   Activity Functional mobility, self care tasks   Comments No LOB noted;   Bed Mobility   Supine to Sit Unable to assess  (Pt seated OOB in chair at start/end of session)   Sit to Supine Unable to assess  (Pt seated OOB in chair at start/end of session)   Additional Comments Pt seated OOB in chair at end of session with call bell and phone within reach  All needs met and pt reports no further questions for OT at this time  Transfers   Sit to Stand 7  Independent   Additional items Armrests   Stand to Sit 7  Independent   Additional items Armrests   Toilet transfer 7  Independent   Additional items Standard toilet   Additional Comments Good safety awareness demonstrated   Functional Mobility   Functional Mobility 7  Independent   Additional Comments w/o use of AD   Toilet Transfers   Toilet Transfer Type To and from   Toilet Transfer to Standard toilet   Toilet Transfer Technique Ambulating   Toilet Transfers Independent   Toilet Transfers Comments use of grab bar on R    Cognition   Overall Cognitive Status [facility]   Arousal/Participation Alert; Cooperative   Attention Within functional limits   Orientation Level Oriented X4   Memory Within functional limits   Following Commands Follows all commands and directions without difficulty   Additional Activities   Additional Activities Other (Comment)  (Review of energy conservation techniques)   Additional Activities Comments pt able to recall energy conservation techniques from previous session w/o cues from therapist   Activity Tolerance   Activity Tolerance Patient tolerated treatment well   Medical Staff Made Aware Pt appropriate to be seen per nursing   Assessment   Assessment Pt seen for OT treatment session this AM focusing on functional activity tolerance, ADLs, energy conservation education, and functional mobility/transfers  Pt alert and cooperative throughout session  Pt seated OOB in chair at start of session  Transfers (sit<>stand, to and from standard toilet) completed at an Independent level with good safety awareness demonstrated and good carryover of previous education regarding previous techniques  Pt progressed to Independent in functional mobility without use of AD, demonstrating Normal dynamic standing balance  ADLs completed while seated OOB in chair  Pt demonstrating ability to complete dressing (UB and LB), Grooming, and Toileting tasks at an Independent level with no LOBs noted during all tasks while seated/standing  Additionally, pt able to recall energy conservation techniques from previous session w/o cues from therapist  Pt seated OOB in chair at end of session with call bell and phone within reach  All needs met and pt reports no further questions for OT at this time  Pt has met all OT LTGs at this time, demonstrating independence in ADLs, IADLs, and functional mobility/transfers  Pt with no concerns regarding returning home and completing daily routine and feeling ready to return home  Therefore, discussed with pt D/Cing from OT and pt agreed  Will D/C pt from OT caseload at this time  Please re-consult if needed  Recommend Home with family support  Plan   Treatment Interventions ADL retraining;Functional transfer training;Patient/family training;Energy conservation; Activityengagement   Goal Expiration Date 11/22/18   Treatment Day 2   OT Frequency 3-5x/wk   Recommendation   OT Discharge Recommendation Home with family support   OT - OK to Discharge Yes  (when medically cleared)   Barthel Index   Feeding 10   Bathing 5   Grooming Score 5   Dressing Score 10   Bladder Score 10   Bowels Score 10   Toilet Use Score 10   Transfers (Bed/Chair) Score 15   Mobility (Level Surface) Score 15   Stairs Score 5   Barthel Index Score 95   Modified [place] Scale   Modified [place] Scale 1       Alecia Lynn, OTR/L

## 2018-11-16 VITALS
RESPIRATION RATE: 18 BRPM | SYSTOLIC BLOOD PRESSURE: 152 MMHG | OXYGEN SATURATION: 96 % | DIASTOLIC BLOOD PRESSURE: 68 MMHG | HEART RATE: 64 BPM | BODY MASS INDEX: 30.61 KG/M2 | WEIGHT: 213.85 LBS | TEMPERATURE: 97.6 F | HEIGHT: 70 IN

## 2018-11-16 LAB
ANION GAP SERPL CALCULATED.3IONS-SCNC: 10 MMOL/L (ref 4–13)
BUN SERPL-MCNC: 34 MG/DL (ref 5–25)
CALCIUM SERPL-MCNC: 8.7 MG/DL (ref 8.3–10.1)
CHLORIDE SERPL-SCNC: 101 MMOL/L (ref 100–108)
CO2 SERPL-SCNC: 26 MMOL/L (ref 21–32)
CREAT SERPL-MCNC: 1.39 MG/DL (ref 0.6–1.3)
GFR SERPL CREATININE-BSD FRML MDRD: 53 ML/MIN/1.73SQ M
GLUCOSE SERPL-MCNC: 171 MG/DL (ref 65–140)
GLUCOSE SERPL-MCNC: 176 MG/DL (ref 65–140)
GLUCOSE SERPL-MCNC: 250 MG/DL (ref 65–140)
POTASSIUM SERPL-SCNC: 3.5 MMOL/L (ref 3.5–5.3)
SODIUM SERPL-SCNC: 137 MMOL/L (ref 136–145)

## 2018-11-16 PROCEDURE — 99232 SBSQ HOSP IP/OBS MODERATE 35: CPT | Performed by: INTERNAL MEDICINE

## 2018-11-16 PROCEDURE — 82948 REAGENT STRIP/BLOOD GLUCOSE: CPT

## 2018-11-16 PROCEDURE — 94660 CPAP INITIATION&MGMT: CPT

## 2018-11-16 PROCEDURE — 80048 BASIC METABOLIC PNL TOTAL CA: CPT | Performed by: FAMILY MEDICINE

## 2018-11-16 PROCEDURE — 99238 HOSP IP/OBS DSCHRG MGMT 30/<: CPT | Performed by: FAMILY MEDICINE

## 2018-11-16 RX ORDER — METOPROLOL TARTRATE 50 MG/1
50 TABLET, FILM COATED ORAL EVERY 12 HOURS SCHEDULED
Qty: 60 TABLET | Refills: 0 | Status: SHIPPED | OUTPATIENT
Start: 2018-11-16 | End: 2018-11-27 | Stop reason: SDUPTHER

## 2018-11-16 RX ORDER — TAMSULOSIN HYDROCHLORIDE 0.4 MG/1
0.4 CAPSULE ORAL
Qty: 30 CAPSULE | Refills: 0 | Status: SHIPPED | OUTPATIENT
Start: 2018-11-16 | End: 2018-12-03 | Stop reason: SDUPTHER

## 2018-11-16 RX ORDER — LOSARTAN POTASSIUM 50 MG/1
50 TABLET ORAL DAILY
Qty: 30 TABLET | Refills: 0 | Status: SHIPPED | OUTPATIENT
Start: 2018-11-17 | End: 2018-11-27 | Stop reason: SDUPTHER

## 2018-11-16 RX ORDER — FUROSEMIDE 40 MG/1
40 TABLET ORAL DAILY
Qty: 30 TABLET | Refills: 0 | Status: SHIPPED | OUTPATIENT
Start: 2018-11-17 | End: 2018-11-21

## 2018-11-16 RX ORDER — FUROSEMIDE 40 MG/1
40 TABLET ORAL DAILY
Status: DISCONTINUED | OUTPATIENT
Start: 2018-11-17 | End: 2018-11-16 | Stop reason: HOSPADM

## 2018-11-16 RX ADMIN — AMLODIPINE BESYLATE 10 MG: 10 TABLET ORAL at 08:56

## 2018-11-16 RX ADMIN — FUROSEMIDE 80 MG: 80 TABLET ORAL at 08:58

## 2018-11-16 RX ADMIN — ALLOPURINOL 300 MG: 100 TABLET ORAL at 08:57

## 2018-11-16 RX ADMIN — LOSARTAN POTASSIUM 50 MG: 50 TABLET ORAL at 08:57

## 2018-11-16 RX ADMIN — APIXABAN 5 MG: 5 TABLET, FILM COATED ORAL at 08:57

## 2018-11-16 RX ADMIN — INSULIN LISPRO 1 UNITS: 100 INJECTION, SOLUTION INTRAVENOUS; SUBCUTANEOUS at 08:56

## 2018-11-16 RX ADMIN — APIXABAN 5 MG: 5 TABLET, FILM COATED ORAL at 17:11

## 2018-11-16 RX ADMIN — FLUTICASONE PROPIONATE 1 SPRAY: 50 SPRAY, METERED NASAL at 08:56

## 2018-11-16 RX ADMIN — METOPROLOL TARTRATE 50 MG: 50 TABLET, FILM COATED ORAL at 08:57

## 2018-11-16 RX ADMIN — VITAMIN D, TAB 1000IU (100/BT) 1000 UNITS: 25 TAB at 08:56

## 2018-11-16 RX ADMIN — INSULIN LISPRO 3 UNITS: 100 INJECTION, SOLUTION INTRAVENOUS; SUBCUTANEOUS at 12:51

## 2018-11-16 RX ADMIN — TAMSULOSIN HYDROCHLORIDE 0.4 MG: 0.4 CAPSULE ORAL at 17:11

## 2018-11-16 NOTE — ASSESSMENT & PLAN NOTE
No results found for: HGBA1C    Recent Labs      11/15/18   1555  11/15/18   2113  11/16/18   0759  11/16/18   1139   POCGLU  258*  225*  176*  250*       Blood Sugar Average: Last 72 hrs:  (P) 218 5     Continue with sliding scale insulin, diabetic diet, Accu-Cheks q i d   Can resume metformin and Amaryl upon discharge

## 2018-11-16 NOTE — TELEPHONE ENCOUNTER
They called to check on their request, could you please follow up to see if its been processed  Thank you!

## 2018-11-16 NOTE — PROGRESS NOTES
Progress Note - Jerry Barron 70 y o  male MRN: [id number]    Unit/Bed#: E4 -01 Encounter: [id number]  Subjective:   Feels good  No chest pain or shortness of breath  Ongoing edema which is the same as usual   No palpitations    Objective:   Vitals: Blood pressure 148/67, pulse 65, temperature 98 4 °F (36 9 °C), temperature source Tympanic, resp  rate 18, height 5' 10" (1 778 m), weight 97 kg (213 lb 13 5 oz), SpO2 95 %  ,Body mass index is 30 68 kg/m²  CMP:   Results from last 7 days  Lab Units 11/16/18  0603   SODIUM mmol/L 137   POTASSIUM mmol/L 3 5   CHLORIDE mmol/L 101   CO2 mmol/L 26   BUN mg/dL 34*   CREATININE mg/dL 1 39*   CALCIUM mg/dL 8 7   EGFR ml/min/1 73sq m 53         Physical exam:  Lungs somewhat decreased breath sounds  No wheezing rhonchi or rales  Heart-regular with grade 2 systolic murmur at the base  No diastolic murmur rub or gallop  Abdomen-obese  Nontender without mass organomegaly  Extremities -1 to 2+ lower extremity edema with absent distal pulses    Assessment:  1  Complete heart block with shock on presentation November 1st   Status post permanent pacemaker  2  Acute renal failure due to low flow state  Resolved  Creatinine back at baseline  3  Paroxysmal atrial fibrillation  During acute illness which include fever and heart failure  Now sinus rhythm  On Eliquis and back on usual dosage of beta-blocker  4  Accelerated hypertension  Patient on amlodipine, losartan and metoprolol at home and back on usual medications except for lower dose of losartan  5  Acute diastolic heart failure  Appears euvolemic  Now on furosemide 80 mg daily  Was using p r n  Furosemide at home  6  Hyperlipidemia-on statin therapy  On simvastatin at home  7  Diabetes mellitus  8  Chronic kidney disease    Plan:   Okay to home from my standpoint  Cardiac medications as follows  Eliquis 5 mg b i d -new  Furosemide 40 mg daily-he was taking p r n   But should take daily now until seen by us  Losartan 50 mg daily-reduced from usual dosage of 100 mg daily  Amlodipine 10 mg daily-same  Metoprolol tartrate 50 mg b i d  Rony Bush   Apparently he was taking 100 mg daily at home  Simvastatin 20 mg daily-reduced from usual dose of 40 mg since he takes amlodipine    He had been on chlorthalidone which we will hold at this time but perhaps introduced later for better blood pressure control    We will arrange follow-up with the pacemaker Clinic and with our group in the next few weeks    Janie Brock MD

## 2018-11-16 NOTE — TELEPHONE ENCOUNTER
I called and spoke with Cody Segundo at [facility]  She states it was closed for one reason or another  She states she reopened the portal and would have the records sent

## 2018-11-16 NOTE — ASSESSMENT & PLAN NOTE
With acute pulmonary edema  Continue diuresis with Lasix 40 mg PO daily  Cardiology also re-dosing metolazone  Cardiology will follow up patient in the outpatient setting  Patient's current weight is now below admission weight  Low-salt diet

## 2018-11-16 NOTE — ASSESSMENT & PLAN NOTE
Presented with complete heart block  sufferred cardiac arrest in ER  Was intubated and resuscitated  Received pacer  Now tachycardic with rapid atrial fibrillation  Cardiology following-appreciate their consult and recommendations  Follow-up with Dr Monge upon discharge at the pacemaker clinic  He is hemodynamically stable for discharge to home today

## 2018-11-16 NOTE — PHYSICIAN ADVISOR
Current patient class: Inpatient  The patient is currently on Hospital Day: 15 at [facility]      The patient was admitted to the hospital at 1440 on 11/1/18 for the following diagnosis:  Cardiac arrest [facility]) [I46 9]  Respiratory arrest ([address] ) [R09 2]  Third degree heart block ([address] ) [I44 2]  Weakness [R53 1]  Hypotension [I95 9]       There is documentation in the medical record of an expected length of stay of at least 2 midnights  The patient is therefore expected to satisfy the 2 midnight benchmark and given the 2 midnight presumption is appropriate for INPATIENT ADMISSION  Given this expectation of a satisfying stay, CMS instructs us that the patient is most often appropriate for inpatient admission under part A provided medical necessity is documented in the chart  After review of the relevant documentation, labs, vital signs and test results, the patient is appropriate for INPATIENT ADMISSION  Admission to the hospital as an inpatient is a complex decision making process which requires the practitioner to consider the patients presenting complaint, history and physical examination and all relevant testing  With this in mind, in this case, the patient was deemed appropriate for INPATIENT ADMISSION  After review of the documentation and testing available at the time of the admission I concur with this clinical determination of medical necessity  Rationale is as follows: The patient is a 70 yrs old Male who presented to the ED at 11/1/2018 12:10 PM with a chief complaint of Heart Problem (feeling weak for past 2 weeks  Pt comes in with EMS in third degree heart block)     Given the need for further hospitalization, and along with the documentation of medical necessity present in the chart, the patient is appropriate for inpatient admission  The patient is expected to satisfy the 2 midnight benchmark, and will require further acute medical care   The patient does have comorbid conditions which increases the risk for significant adverse outcome  Given this the patient is appropriate for inpatient admission  The patients vitals on arrival were ED Triage Vitals   Temperature Pulse Respirations Blood Pressure SpO2   11/01/18 1528 11/01/18 1215 11/01/18 1215 11/01/18 1215 11/01/18 1215   (!) 96 9 °F (36 1 °C) (!) 30 (!) 31 129/71 94 %      Temp Source Heart Rate Source Patient Position - Orthostatic VS BP Location FiO2 (%)   11/01/18 1528 11/01/18 1233 11/01/18 1236 11/01/18 1236 --   Temporal Monitor Lying Right arm       Pain Score       11/01/18 1549       No Pain           Past Medical History:   Diagnosis Date    Diabetes mellitus [facility] )     Diabetic foot ulcer (HCC)     Eczema     Erectile dysfunction     Gout     Hyperlipidemia     Hypertension     Murmur     Nephropathy     Osteoarthritis     PAD (peripheral artery disease) (HCC)     Vertigo      Past Surgical History:   Procedure Laterality Date    CARPAL TUNNEL RELEASE      KNEE SURGERY      SHOULDER SURGERY             Consults have been placed to:   IP CONSULT TO CARDIOLOGY  IP CONSULT TO CASE MANAGEMENT  IP CONSULT TO NEPHROLOGY  IP CONSULT TO UROLOGY  IP CONSULT TO NUTRITION SERVICES    Vitals:    11/15/18 0600 11/15/18 0700 11/15/18 1100 11/15/18 1500   BP:  146/66 122/61 141/65   BP Location:  Left arm Right arm Right arm   Pulse:  84 84 61   Resp:  18 18 18   Temp:  98 5 °F (36 9 °C) 97 8 °F (36 6 °C) 97 6 °F (36 4 °C)   TempSrc:   Tympanic Tympanic   SpO2:  97% 98% 97%   Weight: 99 1 kg (218 lb 7 6 oz)      Height:           Most recent labs:    Recent Labs      11/13/18   0541   11/15/18   0615   WBC  11 42*   --    --    HGB  8 7*   --    --    HCT  26 2*   --    --    PLT  425*   --    --    K  3 6   < >  3 6   CALCIUM  8 8   < >  8 9   BUN  28*   < >  31*   CREATININE  1 22   < >  1 37*    < > = values in this interval not displayed         Scheduled Meds:  Current Facility-Administered Medications:  acetaminophen 650 mg Oral 4x Daily PRN PAT Post   allopurinol 300 mg Oral Daily PAT Tellez   amLODIPine 10 mg Oral Daily Theresa Sharpe MD   apixaban 5 mg Oral BID Ruarnaldo Hinds, DO   cholecalciferol 1,000 Units Oral Daily PAT Tellez   diltiazem 10 mg Intravenous Q6H PRN PAT Tellez   docusate sodium 100 mg Oral BID PAT Olsen   fluticasone 1 spray Each Nare Daily Azael Griffin, DO   furosemide 80 mg Oral Daily Charna Riedel, MD   insulin lispro 1-6 Units Subcutaneous 4x Daily (AC & HS) Tremaine Aponte MD   levalbuterol 1 25 mg Nebulization Q6H PRN Nadir Garcia, MD   losartan 50 mg Oral Daily Charna Riedel, MD   metoprolol tartrate 50 mg Oral Q12H [facility] Charna Riedel, MD   pravastatin 80 mg Oral Daily With PAT Daniels   senna 2 tablet Oral HS PAT Olsen   sodium chloride 3 mL Nebulization Q6H PRN Nadir Garcia, MD   tamsulosin 0 4 mg Oral Daily With Dinner PAT Tellez     Continuous Infusions:   PRN Meds:   acetaminophen    diltiazem    levalbuterol    sodium chloride    Surgical procedures (if appropriate):

## 2018-11-16 NOTE — DISCHARGE SUMMARY
Discharge- Julius Meo 1955, 70 y o  male MRN: [id number]    Unit/Bed#: E4 -01 Encounter: [id number]    Primary Care Provider: Sammie Sosa DO   Date and time admitted to hospital: 11/1/2018 12:10 PM        * Complete heart block [facility])   Assessment & Plan    Presented with complete heart block  sufferred cardiac arrest in ER  Was intubated and resuscitated  Received pacer  Now tachycardic with rapid atrial fibrillation  Cardiology following-appreciate their consult and recommendations  Follow-up with Dr Monge upon discharge at the pacemaker clinic  He is hemodynamically stable for discharge to home today  Acute respiratory failure with hypoxia [facility])   Assessment & Plan    Resolved  Now on oral Lasix with re-dosing of metolazone  Cardiology following patient  Acute diastolic (congestive) heart failure (HCC)   Assessment & Plan    With acute pulmonary edema  Continue diuresis with Lasix 40 mg PO daily  Cardiology also re-dosing metolazone  Cardiology will follow up patient in the outpatient setting  Patient's current weight is now below admission weight  Low-salt diet  Paroxysmal atrial fibrillation (HCC)   Assessment & Plan    With rapid ventricular response  Continue controlling rate with metoprolol  Continue apixaban for anticoagulation  Type 2 diabetes mellitus with chronic kidney disease, without long-term current use of insulin [facility])   Assessment & Plan    No results found for: HGBA1C    Recent Labs      11/15/18   1555  11/15/18   2113  11/16/18   0759  11/16/18   1139   POCGLU  258*  225*  176*  250*       Blood Sugar Average: Last 72 hrs:  (P) 218 5     Continue with sliding scale insulin, diabetic diet, Accu-Cheks q i d   Can resume metformin and Amaryl upon discharge  Obstructive sleep apnea   Assessment & Plan    Suspected  Dr Jason Thrasher would like the patient to get an outpatient sleep study ASAP       Essential hypertension   Assessment & Plan    Continue Norvasc 10 mg daily and Lopressor 50 mg BID  Chronic kidney disease, stage III (moderate) ([facility])   Assessment & Plan    Current creatinine 1 37  EGFR 58  Avoid nephrotoxins, hypotension, NSAIDs, anemia  Discharging Physician / Practitioner: Martine Aly MD  PCP: Farhat Claros DO  Admission Date:   Admission Orders     Ordered        11/01/18 1440  Inpatient Admission (expected length of stay for this patient is greater than two midnights)  Once             Discharge Date: 11/16/18    Resolved Problems  Date Reviewed: 11/16/2018          Resolved    Acute kidney injury ([address] ) 11/13/2018     Resolved by  Martine Aly MD          Consultations During Hospital Stay:  · Cardiology, Nephrology  Procedures Performed:     · Portable chest x-ray 11/01/2018 x3, portable chest x-ray 11/04/2018, portable chest x-ray 11/06/2018, 2D echocardiogram 11/01/2018, cardiac pacer implantation 11/06/2018, 12 lead EKG 11/01/2018  Significant Findings / Test Results:   Portable chest x-ray 11/1-mild cardiomegaly with pulmonary edema/bilateral infiltrates  Portable chest x-ray 11/1-satisfactory endotracheal tube location  Improving pulmonary edema  Portable chest x-ray 11/1-persistent pulmonary edema with bibasilar pleural effusions and volume loss versus infiltrates  No pneumothorax  Monitoring leads and clips project over the chest   Portable chest x-ray 11/4-congestive failure and probable bibasilar atelectasis  Portable chest x-ray 11/6-status post left chest wall pacemaker insertion  Diffuse bilateral interstitial and airspace opacities consistent with edema and not significantly changed  Transthoracic echocardiogram 11/1-left ventricular systolic function was normal with ejection fraction estimated to be 65%  There were no regional wall motion abnormalities and left ventricular wall thickness was mildly to moderately increased    Twelve lead EKG 11/1-bradycardic with complete left bundle branch block and third-degree AV block  Incidental Findings:   · None     Test Results Pending at Discharge (will require follow up): · None     Outpatient Tests Requested:  · Outpatient sleep study to be performed by pulmonology 11/22/2018  Complications:  None    Reason for Admission:  Complete heart block    Hospital Course:     Ximena Peterson is a 70 y o  male patient who originally presented to the hospital on 11/1/2018 due to complete heart block  The patient was initially temporarily paced with a temporary pacemaker, was admitted to the ICU and intubated due to acute hypoxic respiratory failure  He eventually had a permanent pacemaker placement, was extubated and transferred to the medical-surgical unit  During his hospitalization he developed paroxysmal atrial fibrillation as well as acute diastolic congestive heart failure  He was managed in assistance with Cardiology on IV diuretics for his acute CHF exacerbation as well as rate control for his atrial fibrillation with metoprolol, and anticoagulation with Eliquis  The patient responded well to therapy  There were no adverse events  Due to his acute hypoxic respiratory failure and body habitus there was concern for possible obstructive sleep apnea  As a result of this the pulmonology team decided that he should follow up with them as an outpatient for an overnight polysomnogram   The patient tolerated and did well with IV diuresis and was eventually switched to oral diuretics in the form of Lasix 40 mg daily  He was able to achieve a dry weight below his admission weight  He was seen and evaluated by Physical therapy/Occupational therapy and they recommended that he be discharged to home with family support and outpatient physical therapy  The patient is also to follow up in the pacemaker clinic with Cardiology  The patient is hemodynamically stable for discharge to home today      Please see above list of diagnoses and related plan for additional information  Condition at Discharge: stable     Discharge Day Visit / Exam:     Subjective:  Patient denies any chest pain or shortness of breath currently  Vitals: Blood Pressure: 153/69 (11/16/18 1100)  Pulse: 60 (11/16/18 1100)  Temperature: 97 9 °F (36 6 °C) (11/16/18 1100)  Temp Source: Tympanic (11/16/18 1100)  Respirations: 18 (11/16/18 1100)  Height: 5' 10" (177 8 cm) (11/07/18 1810)  Weight - Scale: 97 kg (213 lb 13 5 oz) (11/16/18 0600)  SpO2: 97 % (11/16/18 1100)  Exam:   Physical Exam   Constitutional: He is oriented to person, place, and time  He appears well-developed and well-nourished  No distress  HENT:   Head: Normocephalic and atraumatic  Eyes: Pupils are equal, round, and reactive to light  EOM are normal    Neck: Normal range of motion  Neck supple  Cardiovascular: Normal rate, regular rhythm and normal heart sounds  Pulmonary/Chest: Effort normal and breath sounds normal    Abdominal: Soft  Bowel sounds are normal    Musculoskeletal: Normal range of motion  He exhibits no edema or tenderness  Neurological: He is alert and oriented to person, place, and time  Skin: Skin is warm and dry  He is not diaphoretic  Discussion with Family:  No    Discharge instructions/Information to patient and family:   See after visit summary for information provided to patient and family  Provisions for Follow-Up Care:  See after visit summary for information related to follow-up care and any pertinent home health orders  Disposition:     Home    For Discharges to [address] [place] [address] SNF:   · Not Applicable to this Patient - Not Applicable to this Patient    Planned Readmission:  None     Discharge Statement:  I spent 20 minutes discharging the patient  This time was spent on the day of discharge  I had direct contact with the patient on the day of discharge   Greater than 50% of the total time was spent examining patient, answering all patient questions, arranging and discussing plan of care with patient as well as directly providing post-discharge instructions  Additional time then spent on discharge activities  Discharge Medications:  See after visit summary for reconciled discharge medications provided to patient and family        ** Please Note: This note has been constructed using a voice recognition system **

## 2018-11-19 ENCOUNTER — TRANSITIONAL CARE MANAGEMENT (OUTPATIENT)
Dept: FAMILY MEDICINE CLINIC | Facility: CLINIC | Age: 63
End: 2018-11-19

## 2018-11-19 ENCOUNTER — IN-CLINIC DEVICE VISIT (OUTPATIENT)
Dept: CARDIOLOGY CLINIC | Facility: CLINIC | Age: 63
End: 2018-11-19

## 2018-11-19 ENCOUNTER — TELEPHONE (OUTPATIENT)
Dept: FAMILY MEDICINE CLINIC | Facility: CLINIC | Age: 63
End: 2018-11-19

## 2018-11-19 ENCOUNTER — PATIENT OUTREACH (OUTPATIENT)
Dept: FAMILY MEDICINE CLINIC | Facility: CLINIC | Age: 63
End: 2018-11-19

## 2018-11-19 DIAGNOSIS — Z95.0 CARDIAC PACEMAKER IN SITU: ICD-10-CM

## 2018-11-19 DIAGNOSIS — I49.5 SICK SINUS SYNDROME (HCC): Primary | ICD-10-CM

## 2018-11-19 DIAGNOSIS — Z45.010 ENCNTR FOR CHECKING AND TEST OF CARD PACEMAKER PULSE GNRTR: ICD-10-CM

## 2018-11-19 PROCEDURE — 99024 POSTOP FOLLOW-UP VISIT: CPT

## 2018-11-19 NOTE — TELEPHONE ENCOUNTER
Jameyi: Spoke with Tanja Owusu and he stated Clinton Weber had called him to discuss further   He is going to have Destinee [place] call us back tomorrow to set up a TCM appt

## 2018-11-19 NOTE — TELEPHONE ENCOUNTER
Guadalupe Castellanos sent this message through [employer] in regards to Cesar Duncan, Please advise:      "Dr Moss Mooring  This is Guadalupe Castellanos contacting you on behalf of Cesar Duncan was released from [facility] on Friday 11/16 after a 16 day stay  He is beginning on his road to recovery from a truly hellish experience especially the 1st 7 days in Critical Care after he coded twice in the ER, had a temporary pacemaker implanted, then a permanent Pacemaker  He had a breathing tube and his hands were tied down to the bed  This experience seemed to cause havoc with many  systems and organs in his body  He had bags of medication hanging for the many problems his body was having  Something very sad was that the last thing he remembered was the ambulance personnel coming to our home around 11:30 on 11/1 and when he fully woke up in the CCU, he had no idea what happened to him  Since he was intubated and couldn't talk, on the 3rd day he wrote me a note asking "what's wrong with me" and bit by bit we told him what happened  He had terrible chest discomfort because CPR was performed  He was seen by many  different doctors, from many different specialties  He  had phenomenal care from phenomenal staff who all took great care of him and gradually helped him get better  He has  diagnoses of Congestive Heart Failure and AFIB along with the diabetes  He is getting a sleep study done on Tuesday 11/20 as requested by the [address] [place] [address] doctor, Dr Almita Newman,  because Cesar Duncan definitely has sleep apnea  I am letting you know all of this because according to the  Steve Birmingham who discharged him, he is being released back to your care, along with the specialists he will be seeing and also, his aftercare  is an RN named Darya Peraza who is affiliated with your practice  We would like to have her contact information  In addition to Brennan's recovering , another blessing is that I was not working on 11/1 and was home when he told me he couldn't get out of bed and needed 911 called   Had I worked that day or even been out that afternoon, I would have come and he would have passed away in our home, according to the  time that he 1st coded in the ER  This could have been a "- maker" event but we are so blessed     Kelley Hope"

## 2018-11-19 NOTE — PROGRESS NOTES
Results for orders placed or performed in visit on 11/19/18   Cardiac EP device report    Narrative    MDT DUAL PM  DEVICE INTERROGATED IN THE [facility] Hand OFFICE  WOUND CHECK: INCISION CLEAN AND DRY WITH EDGES APPROXIMATED; WOUND CARE AND RESTRICTIONS REVIEWED WITH PATIENT  BATTERY VOLTAGE ADEQUATE (13 3 YRS)  AP-17%, -39%  ALL LEAD PARAMETERS WITHIN NORMAL LIMITS  1 AF EPISODE >99 HRS WHILE IN [facility] CARE [place]  PT ON ELIQUIS & METOPROLOL  NORMAL DEVICE FUNCTION   GV

## 2018-11-19 NOTE — PROGRESS NOTES
Sophie Scanlon is managing at home and denies needing additional caregiver assistance  He is taking all medications  He is scheduled for sleep study tomorrow, other f/u appointments scheduled  He is waiting for call back from PCP office for f/u appointment  His pacemaker site is intact without s/s of infection  He is weighing daily and weight has been consistent  He denies sob, chest pain or LE edema  Teaching performed on s/s of heart failure, weight parameters, low sodium diet  He is checking blood sugars twice daily and they are running around 85  No hypoglycemic episodes  Nutritional intake adequate, he is following low sodium diet  I provided him with my contact information and he is agreeable to further outreach

## 2018-11-20 ENCOUNTER — HOSPITAL ENCOUNTER (OUTPATIENT)
Dept: SLEEP CENTER | Facility: HOSPITAL | Age: 63
Discharge: HOME | End: 2018-11-20
Payer: MEDICARE

## 2018-11-20 DIAGNOSIS — G47.33 OSA (OBSTRUCTIVE SLEEP APNEA): ICD-10-CM

## 2018-11-20 PROCEDURE — 95810 POLYSOM 6/> YRS 4/> PARAM: CPT

## 2018-11-21 ENCOUNTER — OFFICE VISIT (OUTPATIENT)
Dept: FAMILY MEDICINE CLINIC | Facility: CLINIC | Age: 63
End: 2018-11-21
Payer: MEDICARE

## 2018-11-21 VITALS
HEIGHT: 70 IN | BODY MASS INDEX: 32.5 KG/M2 | HEART RATE: 76 BPM | WEIGHT: 227 LBS | SYSTOLIC BLOOD PRESSURE: 140 MMHG | TEMPERATURE: 97.9 F | RESPIRATION RATE: 16 BRPM | DIASTOLIC BLOOD PRESSURE: 62 MMHG | OXYGEN SATURATION: 96 %

## 2018-11-21 DIAGNOSIS — E11.22 TYPE 2 DIABETES MELLITUS WITH STAGE 3 CHRONIC KIDNEY DISEASE, WITHOUT LONG-TERM CURRENT USE OF INSULIN (HCC): ICD-10-CM

## 2018-11-21 DIAGNOSIS — I48.0 PAROXYSMAL ATRIAL FIBRILLATION (HCC): ICD-10-CM

## 2018-11-21 DIAGNOSIS — J96.01 ACUTE RESPIRATORY FAILURE WITH HYPOXIA (HCC): ICD-10-CM

## 2018-11-21 DIAGNOSIS — N18.30 TYPE 2 DIABETES MELLITUS WITH STAGE 3 CHRONIC KIDNEY DISEASE, WITHOUT LONG-TERM CURRENT USE OF INSULIN (HCC): ICD-10-CM

## 2018-11-21 DIAGNOSIS — I44.2 COMPLETE HEART BLOCK (HCC): Primary | ICD-10-CM

## 2018-11-21 DIAGNOSIS — R53.1 WEAKNESS GENERALIZED: ICD-10-CM

## 2018-11-21 DIAGNOSIS — I50.31 ACUTE DIASTOLIC (CONGESTIVE) HEART FAILURE (HCC): ICD-10-CM

## 2018-11-21 PROCEDURE — 99496 TRANSJ CARE MGMT HIGH F2F 7D: CPT | Performed by: FAMILY MEDICINE

## 2018-11-21 NOTE — PROGRESS NOTES
Assessment/Plan:    Patient to continue present treatment  Patient being referred for physical therapy evaluation and treatment  Patient to follow up with specialists as scheduled and return to the office in 3 months  TCM Call (since 10/21/2018)     Date and time call was made  11/20/2018  8:10 AM    Hospital care reviewed  Records reviewed    Patient was hospitialized at  [address]    Date of Admission  11/01/18    Date of discharge  11/01/18    Diagnosis  Heart blockage, CHF, Afib    Disposition  Home    Were the patients medications reviewed and updated  No    Current Symptoms  None      TCM Call (since 10/21/2018)     Post hospital issues  None    Should patient be enrolled in anticoag monitoring? Yes    Scheduled for follow up?   Yes    Patients specialists  Cardiologist; Nephrologist    Cardiologist name  Dr Irma Bettencourt    Nephrologist name  Dr Ron Leong    Did you obtain your prescribed medications  No    Why were you unable to obtain your medications  yes    Do you need help managing your prescriptions or medications  No    Is transportation to your appointment needed  No    I have advised the patient to call PCP with any new or worsening symptoms  [facility] members    Support System  Family    The type of support provided  Emotional    Do you have social support  Yes, as much as I need    Are you recieving any outpatient services  No    Are you recieving home care services  No    Are you using any community resources  No    Current waiver services  No    Have you fallen in the last 12 months  Yes    How many times  2    Interperter language line needed  No    Counseling  Family           Diagnoses and all orders for this visit:    Complete heart block (HCC)    Acute respiratory failure with hypoxia (HCC)    Acute diastolic (congestive) heart failure (HCC)    Paroxysmal atrial fibrillation ([facility] [address] )    Type 2 diabetes mellitus with stage 3 chronic kidney disease, without long-term current use of insulin ([facility] [address] )  -     Ambulatory referral to Podiatry; Future    Weakness generalized  -     Ambulatory referral to Physical Therapy; Future          Subjective:      Patient ID: Julius Schmitz is a 70 y o  male  Patient is being seen in follow-up from recent hospitalization at [place] [address] from 11/01/2018 until 11/15/18 for complete heart block and acute respiratory failure requiring ventilator  Patient had permanent pacemaker placed and was seen at the pacemaker clinic yesterday  He has a follow-up appointment with Dr Kacy Ha at [facility] Cardiology on 11/27/2018  Patient had a sleep study done last night  He has follow-up appointments with Dr Munir Taylor at [facility] Endocrinology and Dr Tori Krueger at [facility] pulmonology  Patient was recommended for physical therapy  Patient is feeling significantly better overall  Appetite is good and patient is sleeping well overall  The following portions of the patient's history were reviewed and updated as appropriate: allergies, current medications, past family history, past medical history, past social history, past surgical history and problem list     Review of Systems   Constitutional: Positive for activity change and unexpected weight change  Negative for appetite change, chills, diaphoresis, fatigue and fever  HENT: Negative  Eyes: Negative  Respiratory: Negative for cough, chest tightness, shortness of breath and wheezing  Cardiovascular: Positive for leg swelling  Negative for chest pain and palpitations  Gastrointestinal: Positive for diarrhea  Negative for abdominal pain, blood in stool, constipation, nausea and vomiting  Endocrine: Negative for cold intolerance, heat intolerance, polydipsia and polyuria  Genitourinary: Positive for frequency  Negative for difficulty urinating, dysuria, hematuria and urgency  Musculoskeletal: Negative  Skin: Positive for rash     Neurological: Positive for weakness  Negative for dizziness, syncope, light-headedness, numbness and headaches  Hematological: Negative for adenopathy  Bruises/bleeds easily  Psychiatric/Behavioral: Negative for dysphoric mood and sleep disturbance  The patient is not nervous/anxious  Objective:      /62   Pulse 76   Temp 97 9 °F (36 6 °C)   Resp 16   Ht 5' 10 08" (1 78 m)   Wt 103 kg (227 lb)   SpO2 96%   BMI 32 50 kg/m²          Physical Exam   Constitutional: He is oriented to person, place, and time  He appears well-developed and well-nourished  No distress  HENT:   Head: Normocephalic  Right Ear: External ear normal    Left Ear: External ear normal    Nose: Nose normal    Mouth/Throat: Oropharynx is clear and moist    Eyes: Conjunctivae are normal  No scleral icterus  Neck: Neck supple  Cardiovascular: Normal rate and regular rhythm  Pulmonary/Chest: Effort normal and breath sounds normal    Abdominal: Soft  There is no tenderness  Musculoskeletal: He exhibits edema  1+ pretibial edema bilaterally  Lymphadenopathy:     He has no cervical adenopathy  Neurological: He is alert and oriented to person, place, and time  Skin: Skin is warm and dry  Psychiatric: He has a normal mood and affect

## 2018-11-23 ENCOUNTER — TELEPHONE (OUTPATIENT)
Dept: FAMILY MEDICINE CLINIC | Facility: CLINIC | Age: 63
End: 2018-11-23

## 2018-11-23 DIAGNOSIS — E13.8 DIABETES MELLITUS OF OTHER TYPE WITH COMPLICATION, UNSPECIFIED WHETHER LONG TERM INSULIN USE: ICD-10-CM

## 2018-11-23 NOTE — TELEPHONE ENCOUNTER
lmom for wife, Lidia Campos, to review Brennan's medication list per her request from her Marqui message   Please go over medications with her to update pts chart

## 2018-11-26 PROBLEM — I50.32 CHRONIC DIASTOLIC (CONGESTIVE) HEART FAILURE (HCC): Status: ACTIVE | Noted: 2018-11-12

## 2018-11-27 ENCOUNTER — OFFICE VISIT (OUTPATIENT)
Dept: CARDIOLOGY CLINIC | Facility: CLINIC | Age: 63
End: 2018-11-27
Payer: MEDICARE

## 2018-11-27 VITALS
DIASTOLIC BLOOD PRESSURE: 60 MMHG | WEIGHT: 227.6 LBS | HEART RATE: 78 BPM | HEIGHT: 70 IN | SYSTOLIC BLOOD PRESSURE: 142 MMHG | BODY MASS INDEX: 32.58 KG/M2

## 2018-11-27 DIAGNOSIS — E78.5 HYPERLIPIDEMIA, UNSPECIFIED HYPERLIPIDEMIA TYPE: ICD-10-CM

## 2018-11-27 DIAGNOSIS — I10 ESSENTIAL HYPERTENSION: ICD-10-CM

## 2018-11-27 DIAGNOSIS — D64.9 ANEMIA, UNSPECIFIED TYPE: ICD-10-CM

## 2018-11-27 DIAGNOSIS — I48.0 PAROXYSMAL ATRIAL FIBRILLATION (HCC): ICD-10-CM

## 2018-11-27 DIAGNOSIS — I50.32 CHRONIC DIASTOLIC (CONGESTIVE) HEART FAILURE (HCC): ICD-10-CM

## 2018-11-27 DIAGNOSIS — E78.2 MIXED HYPERLIPIDEMIA: ICD-10-CM

## 2018-11-27 DIAGNOSIS — I44.2 COMPLETE HEART BLOCK (HCC): Primary | ICD-10-CM

## 2018-11-27 DIAGNOSIS — I35.0 NONRHEUMATIC AORTIC VALVE STENOSIS: ICD-10-CM

## 2018-11-27 PROCEDURE — 99214 OFFICE O/P EST MOD 30 MIN: CPT | Performed by: INTERNAL MEDICINE

## 2018-11-27 RX ORDER — METOPROLOL TARTRATE 50 MG/1
50 TABLET, FILM COATED ORAL EVERY 12 HOURS SCHEDULED
Qty: 180 TABLET | Refills: 3 | Status: SHIPPED | OUTPATIENT
Start: 2018-11-27 | End: 2020-02-28 | Stop reason: SDUPTHER

## 2018-11-27 RX ORDER — LOSARTAN POTASSIUM 50 MG/1
50 TABLET ORAL DAILY
Qty: 90 TABLET | Refills: 3 | Status: SHIPPED | OUTPATIENT
Start: 2018-11-27 | End: 2018-12-10 | Stop reason: SDUPTHER

## 2018-11-27 RX ORDER — SIMVASTATIN 20 MG
20 TABLET ORAL
Qty: 90 TABLET | Refills: 3 | Status: SHIPPED | OUTPATIENT
Start: 2018-11-27 | End: 2020-02-21 | Stop reason: SDUPTHER

## 2018-11-27 NOTE — PROGRESS NOTES
Cardiology Follow Up    Yamilex Part  1955  [id number]  [address]  [address] [facility] [address] [phone]    Reason for visit: Hospital FU for CHB with shock s/p PPM  Also has Chronic diastolic CHF with LE edema, PAF seen in hospital, HTN and HLP    1  Complete heart block (HCC)     2  Paroxysmal atrial fibrillation ([facility] [address] )     3  Essential hypertension     4  Mixed hyperlipidemia         Interval History: Patient was admitted to the hospital November 1 with shock and CHB  Mahin Dobson He ultimately received PPM  He did have ARF which improved  He also had some PAF  Mahin Dobson He was placed on Eliquis and continued on metoprolol for rate control  He was placed back on his usual antihypertensive medications at the time of discharge  He was fairly edematous at the time of discharge but does have chronic edema  Since his discharge his weight has declined about 2 lb on his scale  He does note some dyspnea on exertion  He denies chest pain    He denies palpitations or dizziness    Patient Active Problem List   Diagnosis    Bilateral leg edema    Diabetes mellitus with neuropathy ([facility] )    Diabetic foot ulcer ([facility] )    DM type 2, not at goal [facility])    Easy bruising    Eczema    Erectile dysfunction of non-organic origin    Gout    Hyperlipidemia    Nephropathy    Osteoarthritis    Peripheral arterial disease (HCC)    PVCs (premature ventricular contractions)    Rhinitis    Systolic murmur    Vertigo    Complete heart block (HCC)    Metabolic acidosis    Idiopathic hypotension    Other hyperlipidemia    Sepsis (HCC)    Paroxysmal atrial fibrillation (HCC)    Acute respiratory failure with hypoxia (HCC)    Persistent proteinuria    Volume overload    Urinary retention    Third degree heart block (HCC)    Obstructive sleep apnea    Chronic diastolic (congestive) heart failure (HCC)    Type 2 diabetes mellitus with chronic kidney disease, without long-term current use of insulin ([facility])    Essential hypertension    Chronic kidney disease, stage III (moderate) ([facility])     Past Medical History:   Diagnosis Date    Arthritis     OA    Bruise of both arms     forearms and both hands    Bruises easily     Diabetes mellitus ([facility] [address] )     Diabetic foot ulcer ([facility] [address] )     Eczema     Erectile dysfunction     Gout     Hyperlipidemia     Hypertension     Murmur     Nephropathy     Osteoarthritis     PAD (peripheral artery disease) ([facility])     Seasonal allergies     Toe infection     bilat great toes    Vertigo     Walks frequently     Wears dentures     upper    Wears glasses      Social History     Social History    Marital status: /Civil Union     Spouse name: N/A    Number of children: N/A    Years of education: N/A     Occupational History    Not on file       Social History Main Topics    Smoking status: Former Smoker     Packs/day: 1 00     Years: 30 00     Types: Cigarettes    Smokeless tobacco: Never Used      Comment: quit 10 years ago    Alcohol use No    Drug use: No    Sexual activity: Not on file     Other Topics Concern    Not on file     Social History Narrative    ** Merged History Encounter **         Daily cola consumption (2 cans/day)      Family History   Problem Relation Age of Onset    Heart disease Father     No Known Problems Mother     Hypertension Father     Pulmonary embolism Father      Past Surgical History:   Procedure Laterality Date    CARPAL TUNNEL RELEASE Left     CARPAL TUNNEL RELEASE Right     CARPAL TUNNEL RELEASE      KNEE ARTHROSCOPY Left     KNEE ARTHROSCOPY Right     KNEE SURGERY      TN AMPUTATION TOE,I-P JT Bilateral 5/2/2017    Procedure: PARTIAL AMPUTATION RIGHT AND LEFT HALLUX ;  Surgeon: Azul Du DPM;  Location: [facility] OR;  Service: Podiatry    TN AMPUTATION TOE,MT-P JT Left 7/25/2017    Procedure: 2ND TOE AMPUTATION;  Surgeon: Azul Du DPM; Location: [facility];  Service: Podiatry    SHOULDER ARTHROSCOPY Left     with screws,RTC    SHOULDER SURGERY      VASECTOMY         Current Outpatient Prescriptions:     allopurinol (ZYLOPRIM) 300 mg tablet, TAKE 1 TABLET DAILY  , Disp: 90 tablet, Rfl: 1    amLODIPine (NORVASC) 10 mg tablet, Take 1 tablet (10 mg total) by mouth daily, Disp: 90 tablet, Rfl: 3    apixaban (ELIQUIS) 5 mg, Take 1 tablet (5 mg total) by mouth 2 (two) times a day, Disp: 60 tablet, Rfl: 3    betamethasone valerate (VALISONE) 0 1 % cream, APPLY TO BACK,ARMS,CHEST,LEGS AND ABDOMEN TWICE A DAY, Disp: , Rfl: 3    Cholecalciferol (VITAMIN D-3) 1000 units CAPS, Take 1 capsule by mouth every morning  , Disp: , Rfl:     furosemide (LASIX) 40 mg tablet, Take 1 tablet (40 mg total) by mouth daily as needed (edema), Disp: 90 tablet, Rfl: 1    glimepiride (AMARYL) 4 mg tablet, TAKE 1 TABLET BY MOUTH TWICE A DAY, Disp: 60 tablet, Rfl: 3    glucose blood (ONE TOUCH ULTRA TEST) test strip, 1 each by Other route 3 (three) times a day, Disp: 100 each, Rfl: 3    hydrOXYzine HCL (ATARAX) 10 mg tablet, , Disp: , Rfl:     Insulin Degludec-Liraglutide (XULTOPHY) 100 units-3 6 mg/mL injection pen, Inject 30 Units under the skin daily for 90 days, Disp: 30 pen, Rfl: 0    losartan (COZAAR) 50 mg tablet, Take 1 tablet (50 mg total) by mouth daily, Disp: 30 tablet, Rfl: 0    metFORMIN (GLUCOPHAGE) 500 mg tablet, Take 2 tablets (1,000 mg total) by mouth 2 (two) times a day with meals [id number] /twice a day, Disp: 360 tablet, Rfl: 3    metoprolol tartrate (LOPRESSOR) 50 mg tablet, Take 1 tablet (50 mg total) by mouth every 12 (twelve) hours, Disp: 60 tablet, Rfl: 0    Multiple Vitamin (MULTIVITAMIN) tablet, Take 1 tablet by mouth daily, Disp: , Rfl:     tamsulosin (FLOMAX) 0 4 mg, Take 1 capsule (0 4 mg total) by mouth daily with dinner, Disp: 30 capsule, Rfl: 0    aspirin 81 MG tablet, Take 1 tablet by mouth daily, Disp: , Rfl:    chlorthalidone 25 mg tablet, Take 1 tablet (25 mg total) by mouth daily (Patient not taking: Reported on 11/21/2018 ), Disp: 90 tablet, Rfl: 3    Linagliptin 5 MG TABS, Take 5 mg by mouth daily (Patient not taking: Reported on 11/27/2018 ), Disp: 90 mg, Rfl: 3    losartan (COZAAR) 100 MG tablet, Take 1 tablet (100 mg total) by mouth daily (Patient not taking: Reported on 11/21/2018 ), Disp: 90 tablet, Rfl: 3    losartan-hydrochlorothiazide (HYZAAR) 100-25 MG per tablet, TAKE 1 TABLET BY MOUTH EVERY DAY (Patient not taking: Reported on 11/21/2018), Disp: 90 tablet, Rfl: 0    meclizine (ANTIVERT) 25 mg tablet, Take 1 tablet (25 mg total) by mouth 3 (three) times a day as needed for dizziness (Patient not taking: Reported on 11/21/2018 ), Disp: 30 tablet, Rfl: 1    metoprolol tartrate (LOPRESSOR) 100 mg tablet, Take 1 tablet (100 mg total) by mouth every 12 (twelve) hours (Patient not taking: Reported on 11/27/2018 ), Disp: 180 tablet, Rfl: 3    omega-3-acid ethyl esters (LOVAZA) 1 g capsule, Take 2 g by mouth 2 (two) times a day, Disp: , Rfl:     simvastatin (ZOCOR) 40 mg tablet, TAKE 1 TABLET DAILY (Patient not taking: Reported on 11/21/2018), Disp: 90 tablet, Rfl: 1  Allergies   Allergen Reactions    Invokana [Canagliflozin]     Lamisil [Terbinafine] Rash    Lamisil [Terbinafine] Blisters     Wife states " His skin peeled from head to toe"    Other Swelling     Pomegranate - facial swelling, no swelling of tongue, esophagus  Adhesive tape   Latex Rash    Latex     Other Rash     palmegranate       Review of Systems:  Review of Systems   Constitutional: Positive for fatigue  Negative for appetite change, diaphoresis and unexpected weight change  Respiratory: Positive for shortness of breath  Negative for cough, chest tightness and wheezing  Cardiovascular: Positive for leg swelling  Negative for chest pain and palpitations     Gastrointestinal: Negative for abdominal pain, blood in stool, constipation and diarrhea  Genitourinary: Positive for frequency  Negative for dysuria, hematuria and urgency  Musculoskeletal: Negative for arthralgias, back pain, gait problem and joint swelling  Neurological: Negative for dizziness, light-headedness and headaches  Psychiatric/Behavioral: Negative for agitation, behavioral problems, confusion and decreased concentration  Physical Exam:  Vitals:    11/27/18 1647   BP: 142/60   BP Location: Right arm   Patient Position: Sitting   Cuff Size: Large   Pulse: 78   Weight: 103 kg (227 lb 9 6 oz)   Height: 5' 10" (1 778 m)     Physical Exam   Constitutional: He is oriented to person, place, and time  He appears well-developed and well-nourished  No distress  obese   HENT:   Head: Normocephalic and atraumatic  Mouth/Throat: No oropharyngeal exudate  Eyes: No scleral icterus  Conjunctiva pale   Neck: Neck supple  Normal carotid pulses and no JVD present  Carotid bruit is not present  No thyromegaly present  Cardiovascular: Normal rate and regular rhythm  Exam reveals no gallop and no friction rub  Murmur heard  Crescendo decrescendo systolic (Basal) murmur is present with a grade of 3/6    Diastolic murmur is present   Pulses:       Dorsalis pedis pulses are 0 on the right side, and 0 on the left side  Posterior tibial pulses are 0 on the right side, and 0 on the left side  Pulmonary/Chest: He has decreased breath sounds in the right lower field and the left lower field  He has no wheezes  He has no rhonchi  He has no rales  Abdominal: Soft  He exhibits no mass  There is no hepatosplenomegaly  There is no tenderness  Musculoskeletal: He exhibits edema (Two to 3+ in the lower extremities bilaterally)  He exhibits no deformity  Neurological: He is alert and oriented to person, place, and time  He has normal strength  No cranial nerve deficit or sensory deficit  Skin: Skin is warm and dry  No rash noted  No erythema  No pallor  Psychiatric: He has a normal mood and affect  His behavior is normal  Judgment and thought content normal        Discussion/Summary:  1  Complete heart block complicated by shock  Patient received permanent pacemaker  Doing fairly well at this point  Still moderately edematous  2  PAF  Patient in sinus rhythm today  Continue Eliquis for stroke prophylaxis  Continue beta-blocker for potential rate control  Will observe interrogations closely to see if ongoing anticoagulation is needed since this did occur in the setting of acute illness  3  Essential hypertension  Blood pressure reasonably controlled on a complex regimen which includes ARB, beta-blocker, calcium channel blocker  4  Mixed hyperlipidemia  Patient currently off simvastatin  Will resume at 20 mg daily specially in light of known diabetes  5  Chronic diastolic heart failure  Do not feel there is much in the way of pulmonary congestion but still has lower extremity edema  Weight is gradually decreasing at home  Will observe this going forward  6  Mild aortic stenosis    Should not be problematic for some time      Did tell patient that he can participate in physical therapy at this time    Will repeat CBC and BMP early next month    FU 6 weeks      Lamont Chen MD

## 2018-11-28 ENCOUNTER — TELEPHONE (OUTPATIENT)
Dept: NEPHROLOGY | Facility: CLINIC | Age: 63
End: 2018-11-28

## 2018-11-28 DIAGNOSIS — R80.1 PERSISTENT PROTEINURIA: ICD-10-CM

## 2018-11-28 DIAGNOSIS — I10 ESSENTIAL HYPERTENSION: Primary | ICD-10-CM

## 2018-11-28 NOTE — TELEPHONE ENCOUNTER
----- Message from Marisa Clancy sent at 2018  4:25 PM EST -----  Regarding: FW: Non-Urgent Medical Question  Contact: [phone]      ----- Message -----  From: Jeffery Villasenor DO  Sent: 2018   4:18 PM  To: Marisa Clancy  Subject: FW: Non-Urgent Medical Question                  Please schedule him to get a CBC, CMP, urinalysis, urine protein to creatinine ratio magnesium, phosphorus prior to his office visit on December 10 thanks    ----- Message -----  From: Marisa Clancy  Sent: 2018   9:52 AM  To: Jeffery Villasenor DO  Subject: FW: Non-Urgent Medical Question                      ----- Message -----  From: Jose Leyva  Sent: 2018   8:22 PM  [facility]  Subject: Non-Urgent Medical Question                      Josejose Leyva,  1955, has a follow up appt with you on  @ 9:30  Due to his recent hospitalization and change in his medical condition, do you want to order any blood work for him to get done prior to his appt with you?  Thank you

## 2018-11-29 ENCOUNTER — TRANSCRIBE ORDERS (OUTPATIENT)
Dept: SLEEP CENTER | Facility: HOSPITAL | Age: 63
End: 2018-11-29

## 2018-11-29 DIAGNOSIS — G47.33 OSA (OBSTRUCTIVE SLEEP APNEA): Primary | ICD-10-CM

## 2018-12-03 ENCOUNTER — PATIENT OUTREACH (OUTPATIENT)
Dept: FAMILY MEDICINE CLINIC | Facility: CLINIC | Age: 63
End: 2018-12-03

## 2018-12-03 ENCOUNTER — TELEPHONE (OUTPATIENT)
Dept: FAMILY MEDICINE CLINIC | Facility: CLINIC | Age: 63
End: 2018-12-03

## 2018-12-03 ENCOUNTER — LAB (OUTPATIENT)
Dept: LAB | Facility: CLINIC | Age: 63
End: 2018-12-03
Payer: MEDICARE

## 2018-12-03 DIAGNOSIS — I10 ESSENTIAL HYPERTENSION: ICD-10-CM

## 2018-12-03 DIAGNOSIS — I50.32 CHRONIC DIASTOLIC (CONGESTIVE) HEART FAILURE (HCC): ICD-10-CM

## 2018-12-03 DIAGNOSIS — D64.9 ANEMIA, UNSPECIFIED TYPE: ICD-10-CM

## 2018-12-03 DIAGNOSIS — R80.1 PERSISTENT PROTEINURIA: ICD-10-CM

## 2018-12-03 DIAGNOSIS — R33.9 URINARY RETENTION: ICD-10-CM

## 2018-12-03 DIAGNOSIS — R60.0 BILATERAL LEG EDEMA: Primary | ICD-10-CM

## 2018-12-03 LAB
ALBUMIN SERPL BCP-MCNC: 3 G/DL (ref 3.5–5)
ALP SERPL-CCNC: 102 U/L (ref 46–116)
ALT SERPL W P-5'-P-CCNC: 23 U/L (ref 12–78)
ANION GAP SERPL CALCULATED.3IONS-SCNC: 7 MMOL/L (ref 4–13)
AST SERPL W P-5'-P-CCNC: 20 U/L (ref 5–45)
BACTERIA UR QL AUTO: ABNORMAL /HPF
BASOPHILS # BLD AUTO: 0.08 THOUSANDS/ΜL (ref 0–0.1)
BASOPHILS NFR BLD AUTO: 1 % (ref 0–1)
BILIRUB SERPL-MCNC: 0.52 MG/DL (ref 0.2–1)
BILIRUB UR QL STRIP: NEGATIVE
BUN SERPL-MCNC: 40 MG/DL (ref 5–25)
CALCIUM SERPL-MCNC: 8.9 MG/DL (ref 8.3–10.1)
CHLORIDE SERPL-SCNC: 107 MMOL/L (ref 100–108)
CLARITY UR: CLEAR
CO2 SERPL-SCNC: 22 MMOL/L (ref 21–32)
COLOR UR: YELLOW
CREAT SERPL-MCNC: 0.95 MG/DL (ref 0.6–1.3)
CREAT UR-MCNC: 83.3 MG/DL
EOSINOPHIL # BLD AUTO: 1.09 THOUSAND/ΜL (ref 0–0.61)
EOSINOPHIL NFR BLD AUTO: 10 % (ref 0–6)
ERYTHROCYTE [DISTWIDTH] IN BLOOD BY AUTOMATED COUNT: 14.5 % (ref 11.6–15.1)
GFR SERPL CREATININE-BSD FRML MDRD: 84 ML/MIN/1.73SQ M
GLUCOSE P FAST SERPL-MCNC: 82 MG/DL (ref 65–99)
GLUCOSE UR STRIP-MCNC: NEGATIVE MG/DL
HCT VFR BLD AUTO: 31.5 % (ref 36.5–49.3)
HGB BLD-MCNC: 10.1 G/DL (ref 12–17)
HGB UR QL STRIP.AUTO: ABNORMAL
HYALINE CASTS #/AREA URNS LPF: ABNORMAL /LPF
IMM GRANULOCYTES # BLD AUTO: 0.05 THOUSAND/UL (ref 0–0.2)
IMM GRANULOCYTES NFR BLD AUTO: 1 % (ref 0–2)
KETONES UR STRIP-MCNC: NEGATIVE MG/DL
LEUKOCYTE ESTERASE UR QL STRIP: NEGATIVE
LYMPHOCYTES # BLD AUTO: 1.3 THOUSANDS/ΜL (ref 0.6–4.47)
LYMPHOCYTES NFR BLD AUTO: 12 % (ref 14–44)
MAGNESIUM SERPL-MCNC: 2.3 MG/DL (ref 1.6–2.6)
MCH RBC QN AUTO: 30.1 PG (ref 26.8–34.3)
MCHC RBC AUTO-ENTMCNC: 32.1 G/DL (ref 31.4–37.4)
MCV RBC AUTO: 94 FL (ref 82–98)
MONOCYTES # BLD AUTO: 0.72 THOUSAND/ΜL (ref 0.17–1.22)
MONOCYTES NFR BLD AUTO: 7 % (ref 4–12)
NEUTROPHILS # BLD AUTO: 7.67 THOUSANDS/ΜL (ref 1.85–7.62)
NEUTS SEG NFR BLD AUTO: 69 % (ref 43–75)
NITRITE UR QL STRIP: NEGATIVE
NON-SQ EPI CELLS URNS QL MICRO: ABNORMAL /HPF
NRBC BLD AUTO-RTO: 0 /100 WBCS
PH UR STRIP.AUTO: 6 [PH] (ref 4.5–8)
PHOSPHATE SERPL-MCNC: 4.4 MG/DL (ref 2.3–4.1)
PLATELET # BLD AUTO: 329 THOUSANDS/UL (ref 149–390)
PMV BLD AUTO: 9.6 FL (ref 8.9–12.7)
POTASSIUM SERPL-SCNC: 5.1 MMOL/L (ref 3.5–5.3)
PROT SERPL-MCNC: 7 G/DL (ref 6.4–8.2)
PROT UR STRIP-MCNC: ABNORMAL MG/DL
PROT UR-MCNC: 377 MG/DL
PROT/CREAT UR: 4.53 MG/G{CREAT} (ref 0–0.1)
RBC # BLD AUTO: 3.36 MILLION/UL (ref 3.88–5.62)
RBC #/AREA URNS AUTO: ABNORMAL /HPF
SODIUM SERPL-SCNC: 136 MMOL/L (ref 136–145)
SP GR UR STRIP.AUTO: 1.01 (ref 1–1.03)
UROBILINOGEN UR QL STRIP.AUTO: 0.2 E.U./DL
WBC # BLD AUTO: 10.91 THOUSAND/UL (ref 4.31–10.16)
WBC #/AREA URNS AUTO: ABNORMAL /HPF

## 2018-12-03 PROCEDURE — 80053 COMPREHEN METABOLIC PANEL: CPT

## 2018-12-03 PROCEDURE — 84100 ASSAY OF PHOSPHORUS: CPT

## 2018-12-03 PROCEDURE — 85025 COMPLETE CBC W/AUTO DIFF WBC: CPT

## 2018-12-03 PROCEDURE — 84156 ASSAY OF PROTEIN URINE: CPT

## 2018-12-03 PROCEDURE — 83735 ASSAY OF MAGNESIUM: CPT

## 2018-12-03 PROCEDURE — 36415 COLL VENOUS BLD VENIPUNCTURE: CPT

## 2018-12-03 PROCEDURE — 82570 ASSAY OF URINE CREATININE: CPT

## 2018-12-03 PROCEDURE — 81001 URINALYSIS AUTO W/SCOPE: CPT

## 2018-12-03 RX ORDER — FUROSEMIDE 40 MG/1
40 TABLET ORAL DAILY PRN
Qty: 90 TABLET | Refills: 0 | Status: SHIPPED | OUTPATIENT
Start: 2018-12-03 | End: 2018-12-10 | Stop reason: SDUPTHER

## 2018-12-03 RX ORDER — TAMSULOSIN HYDROCHLORIDE 0.4 MG/1
0.4 CAPSULE ORAL
Qty: 30 CAPSULE | Refills: 0 | Status: SHIPPED | OUTPATIENT
Start: 2018-12-03 | End: 2018-12-19 | Stop reason: SDUPTHER

## 2018-12-03 NOTE — TELEPHONE ENCOUNTER
Norris Perez sent this message for Diya Ayers, please advise  Would you be willing to refill these for him since they were originally prescribed by the hospital?    "Dr Damon Ayers has two prescriptions, written by the attending DR in the hospital, that soon will need refills  They are Tamsulosin HCL 0 4 mg Capsules and Furosemide 40mg tablets  Any idea how we get these scripts refilled?  thanks [facility]

## 2018-12-03 NOTE — TELEPHONE ENCOUNTER
I will be able to refill these prescriptions for the patient  Please find out how many he needs 30 days or 90 days and which pharmacy

## 2018-12-04 ENCOUNTER — EVALUATION (OUTPATIENT)
Dept: PHYSICAL THERAPY | Facility: CLINIC | Age: 63
End: 2018-12-04
Payer: MEDICARE

## 2018-12-04 DIAGNOSIS — R53.1 WEAKNESS GENERALIZED: Primary | ICD-10-CM

## 2018-12-04 DIAGNOSIS — F41.9 ANXIETY: Primary | ICD-10-CM

## 2018-12-04 PROCEDURE — G8991 OTHER PT/OT GOAL STATUS: HCPCS | Performed by: PHYSICAL THERAPIST

## 2018-12-04 PROCEDURE — G8990 OTHER PT/OT CURRENT STATUS: HCPCS | Performed by: PHYSICAL THERAPIST

## 2018-12-04 PROCEDURE — 97163 PT EVAL HIGH COMPLEX 45 MIN: CPT | Performed by: PHYSICAL THERAPIST

## 2018-12-04 NOTE — PROGRESS NOTES
PT Evaluation     Today's date: 2018  Patient name: Alvaro Major  : 1955  MRN: [id number]  Referring provider: Ama Leslie DO  Dx:   Encounter Diagnosis     ICD-10-CM    1  Weakness generalized R53 1                   Assessment  Assessment details: Spent 20 minutes counseling patient regarding fear and apprehension about exercising or doing strenuous activity around the home  Recommended a behavior health evaluation in order to address his anxiety  Discussed concerns with PCP and the order was written  No further evaluation was performed today and will be performed at his next treatment  Understanding of Dx/Px/POC: good   Prognosis: good    Plan  Plan details: Will complete evaluation next visit  Patient would benefit from: skilled physical therapy and PT eval  Referral necessary: Yes  Frequency: 2x week  Duration in weeks: 8  Treatment plan discussed with: patient and referring physician        Subjective Evaluation    History of Present Illness  Mechanism of injury: Per Dr Willie Ervin on 18  Patient is being seen in follow-up from recent hospitalization at [facility] [place], [place] from 2018 until 11/15/18 for complete heart block and acute respiratory failure requiring ventilator  Patient had permanent pacemaker placed and was seen at the pacemaker clinic yesterday  He has a follow-up appointment with Dr Soledad Juarez at [address] [place] [address] Cardiology on 2018  Patient had a sleep study done last night  He has follow-up appointments with Dr Sridevi Barry at [address] [place] [address] Endocrinology and Dr Milli Huang at [address] [place] [address] pulmonology  Patient was recommended for physical therapy  Patient is feeling significantly better overall  Appetite is good and patient is sleeping well overall      Per Soraida Yang 18    Currently he is having left shoulder pain as a result of the pacemaker  He is having fear of doing too much and feels deconditioned      Quality of life: good          Objective Functional Assessment     Comments  Resting SpO2% - 98  Resting heart rate - 75  BP at rest 180/75          Precautions: pacemaker, DM2, HTN, PAD    Daily Treatment Diary     Manual                                                                                   Exercise Diary                                                                                                                                                                                                                                                                                      Modalities

## 2018-12-05 ENCOUNTER — OFFICE VISIT (OUTPATIENT)
Dept: ENDOCRINOLOGY | Facility: CLINIC | Age: 63
End: 2018-12-05
Payer: MEDICARE

## 2018-12-05 VITALS
SYSTOLIC BLOOD PRESSURE: 136 MMHG | BODY MASS INDEX: 32.35 KG/M2 | DIASTOLIC BLOOD PRESSURE: 60 MMHG | WEIGHT: 226 LBS | HEART RATE: 77 BPM | HEIGHT: 70 IN

## 2018-12-05 DIAGNOSIS — E78.2 MIXED HYPERLIPIDEMIA: ICD-10-CM

## 2018-12-05 DIAGNOSIS — N18.30 TYPE 2 DIABETES MELLITUS WITH STAGE 3 CHRONIC KIDNEY DISEASE, WITHOUT LONG-TERM CURRENT USE OF INSULIN (HCC): Primary | ICD-10-CM

## 2018-12-05 DIAGNOSIS — E11.22 TYPE 2 DIABETES MELLITUS WITH STAGE 3 CHRONIC KIDNEY DISEASE, WITHOUT LONG-TERM CURRENT USE OF INSULIN (HCC): Primary | ICD-10-CM

## 2018-12-05 DIAGNOSIS — E11.40 TYPE 2 DIABETES MELLITUS WITH DIABETIC NEUROPATHY, WITHOUT LONG-TERM CURRENT USE OF INSULIN (HCC): ICD-10-CM

## 2018-12-05 DIAGNOSIS — G47.33 OBSTRUCTIVE SLEEP APNEA: ICD-10-CM

## 2018-12-05 DIAGNOSIS — I10 ESSENTIAL (PRIMARY) HYPERTENSION: ICD-10-CM

## 2018-12-05 PROCEDURE — 99214 OFFICE O/P EST MOD 30 MIN: CPT | Performed by: INTERNAL MEDICINE

## 2018-12-05 RX ORDER — AMLODIPINE BESYLATE 10 MG/1
10 TABLET ORAL DAILY
Qty: 90 TABLET | Refills: 3 | Status: SHIPPED | OUTPATIENT
Start: 2018-12-05 | End: 2019-12-20 | Stop reason: SDUPTHER

## 2018-12-05 RX ORDER — OMEGA-3-ACID ETHYL ESTERS 1 G/1
2 CAPSULE, LIQUID FILLED ORAL 2 TIMES DAILY
Qty: 360 CAPSULE | Refills: 3 | Status: SHIPPED | OUTPATIENT
Start: 2018-12-05 | End: 2020-05-20

## 2018-12-05 NOTE — PROGRESS NOTES
Amelia Perez 70 y o  male MRN: [id number]    Encounter: [id number]      Assessment/Plan     Assessment: This is a 70y o -year-old male with diabetes with hyperglycemia, hypertension and hyperlipidemia  He also has diabetic nephropathy, diabetic neuropathy and obstructive sleep apnea  Plan:  1  Type 2 diabetes with hyperglycemia complicated by nephropathy and neuropathy-his diabetes appears to be under reasonable control  I have asked him to check his blood sugars twice a day at various times which would give us better information  He will continue on his current regimen  Check hemoglobin A1c prior to next visit  2  Diabetic nephropathy-he sees Nephrology for this  3  Diabetic neuropathy is stable  4  Obstructive sleep apnea-he does use CPAP  5  Hypertension is at target  6  Hyperlipidemia- continue statin and fish oil  CC: Diabetes    History of Present Illness     HPI:  47-year-old male with type 2 diabetes for several years who presents for follow-up  He is currently on multiple medications  He has had few episodes of hypoglycemia in the high 60s  His diabetes is complicated by neuropathy and nephropathy  For the hyperlipidemia, he is on fish oil and statin  He denies any myalgia  For the hypertension, he is on metoprolol, losartan and furosemide  He denies any headaches  He is being treated for sleep apnea with CPAP  Review of Systems   Constitutional: Negative for chills and fever  Respiratory: Negative for shortness of breath  Cardiovascular: Negative for chest pain  Gastrointestinal: Negative for constipation, diarrhea, nausea and vomiting  Endocrine: Negative for polydipsia and polyuria  Neurological: Positive for numbness  All other systems reviewed and are negative        Historical Information   Past Medical History:   Diagnosis Date    Arthritis     OA    Bruise of both arms     forearms and both hands    Bruises easily     [facility]     Diabetic foot ulcer [facility]     Eczema     Erectile dysfunction     Gout     Hyperlipidemia     Hypertension     Murmur     Nephropathy     Osteoarthritis     [facility]     Seasonal allergies     Toe infection     bilat great toes    Vertigo     Walks frequently     Wears dentures     upper    Wears glasses      Past Surgical History:   Procedure Laterality Date    CARPAL TUNNEL RELEASE Left     CARPAL TUNNEL RELEASE Right     CARPAL TUNNEL RELEASE      KNEE ARTHROSCOPY Left     KNEE ARTHROSCOPY Right     KNEE SURGERY      NE AMPUTATION TOE,I-P JT Bilateral 5/2/2017    Procedure: PARTIAL AMPUTATION RIGHT AND LEFT HALLUX ;  Surgeon: Brandee Russ DPM;  Location: [facility];  Service: Podiatry    NE AMPUTATION TOE,MT-P JT Left 7/25/2017    Procedure: 2ND TOE AMPUTATION;  Surgeon: Brandee Russ DPM;  Location: [facility];  Service: Podiatry    SHOULDER ARTHROSCOPY Left     with screws,RTC    SHOULDER SURGERY      VASECTOMY       Social History   History   Alcohol Use No     History   Drug Use No     History   Smoking Status    Former Smoker    Packs/day: 1 00    Years: 30 00    Types: Cigarettes   Smokeless Tobacco    Never Used     Comment: quit 10 years ago     Family History:   Family History   Problem Relation Age of Onset    Heart disease Father     No Known Problems Mother     Hypertension Father     Pulmonary embolism Father        Meds/Allergies   Current Outpatient Prescriptions   Medication Sig Dispense Refill    allopurinol (ZYLOPRIM) 300 mg tablet TAKE 1 TABLET DAILY   90 tablet 1    amLODIPine (NORVASC) 10 mg tablet Take 1 tablet (10 mg total) by mouth daily 90 tablet 3    apixaban (ELIQUIS) 5 mg Take 1 tablet (5 mg total) by mouth 2 (two) times a day 60 tablet 3    betamethasone valerate (VALISONE) 0 1 % cream APPLY TO BACK,ARMS,CHEST,LEGS AND ABDOMEN TWICE A DAY  3    Cholecalciferol (VITAMIN D-3) 1000 units CAPS Take 1 capsule by mouth every morning        furosemide (LASIX) 40 mg tablet Take 1 tablet (40 mg total) by mouth daily as needed (edema) 90 tablet 0    glimepiride (AMARYL) 4 mg tablet TAKE 1 TABLET BY MOUTH TWICE A DAY 60 tablet 3    glucose blood (ONE TOUCH ULTRA TEST) test strip 1 each by Other route 3 (three) times a day 100 each 3    hydrOXYzine HCL (ATARAX) 10 mg tablet       Insulin Degludec-Liraglutide (XULTOPHY) 100 units-3 6 mg/mL injection pen Inject 30 Units under the skin daily for 90 days 30 pen 0    losartan (COZAAR) 50 mg tablet Take 1 tablet (50 mg total) by mouth daily 90 tablet 3    metFORMIN (GLUCOPHAGE) 500 mg tablet Take 2 tablets (1,000 mg total) by mouth 2 (two) times a day with meals [id number] /twice a day 360 tablet 3    metoprolol tartrate (LOPRESSOR) 50 mg tablet Take 1 tablet (50 mg total) by mouth every 12 (twelve) hours 180 tablet 3    Multiple Vitamin (MULTIVITAMIN) tablet Take 1 tablet by mouth daily      omega-3-acid ethyl esters (LOVAZA) 1 g capsule Take 2 g by mouth 2 (two) times a day      simvastatin (ZOCOR) 20 mg tablet Take 1 tablet (20 mg total) by mouth daily at bedtime 90 tablet 3    tamsulosin (FLOMAX) 0 4 mg Take 1 capsule (0 4 mg total) by mouth daily with dinner 30 capsule 0     No current facility-administered medications for this visit  Allergies   Allergen Reactions    Invokana [Canagliflozin]     Lamisil [Terbinafine] Rash    Lamisil [Terbinafine] Blisters     Wife states " His skin peeled from head to toe"    Other Swelling     Pomegranate - facial swelling, no swelling of tongue, esophagus  Adhesive tape   Latex Rash    Latex     Other Rash     palmegranate       Objective   Vitals: Blood pressure 136/60, pulse 77, height 5' 10" (1 778 m), weight 103 kg (226 lb)  Physical Exam   Constitutional: He is oriented to person, place, and time  He appears well-developed and well-nourished  No distress  HENT:   Head: Normocephalic and atraumatic  Mouth/Throat: Oropharynx is clear and moist and mucous membranes are normal  No oropharyngeal exudate  Eyes: Conjunctivae, EOM and lids are normal  Right eye exhibits no discharge  Left eye exhibits no discharge  No scleral icterus  Neck: Neck supple  No thyromegaly present  Cardiovascular: Normal rate, regular rhythm and normal heart sounds  Exam reveals no gallop and no friction rub  No murmur heard  Pulmonary/Chest: Effort normal and breath sounds normal  No respiratory distress  He has no wheezes  Abdominal: Soft  Bowel sounds are normal  He exhibits no distension  There is no tenderness  Musculoskeletal: Normal range of motion  He exhibits no edema, tenderness or deformity  Lymphadenopathy:        Head (right side): No occipital adenopathy present  Head (left side): No occipital adenopathy present  Right: No supraclavicular adenopathy present  Left: No supraclavicular adenopathy present  Neurological: He is alert and oriented to person, place, and time  No cranial nerve deficit  Coordination normal    Skin: Skin is warm and intact  No rash noted  He is not diaphoretic  No erythema  Psychiatric: He has a normal mood and affect  His behavior is normal    Vitals reviewed  The history was obtained from the review of the chart, patient      Lab Results:   Lab Results   Component Value Date/Time    Hemoglobin A1C 6 7 (H) 10/12/2018 11:39 AM    Hemoglobin A1C 7 1 (H) 06/28/2018 08:23 AM    Hemoglobin A1C 6 9 (H) 03/15/2018 08:02 AM    WBC 10 91 (H) 12/03/2018 09:49 AM    WBC 11 42 (H) 11/13/2018 05:41 AM    WBC 9 72 11/12/2018 05:38 AM    Hemoglobin 10 1 (L) 12/03/2018 09:49 AM    Hemoglobin 8 7 (L) 11/13/2018 05:41 AM    Hemoglobin 8 7 (L) 11/12/2018 05:38 AM    Hematocrit 31 5 (L) 12/03/2018 09:49 AM    Hematocrit 26 2 (L) 11/13/2018 05:41 AM    Hematocrit 26 3 (L) 11/12/2018 05:38 AM    MCV 94 12/03/2018 09:49 AM    MCV 92 11/13/2018 05:41 AM    MCV 92 11/12/2018 05:38 AM    Platelets 339 60/38/9803 09:49 AM    Platelets 114 (H) 77/27/1903 05:41 AM    Platelets 362 (H) 62/09/3147 05:38 AM    BUN 40 (H) 12/03/2018 09:49 AM    BUN 34 (H) 11/16/2018 06:03 AM    BUN 31 (H) 11/15/2018 06:15 AM    Potassium 5 1 12/03/2018 09:49 AM    Potassium 3 5 11/16/2018 06:03 AM    Potassium 3 6 11/15/2018 06:15 AM    Chloride 107 12/03/2018 09:49 AM    Chloride 101 11/16/2018 06:03 AM    Chloride 101 11/15/2018 06:15 AM    CO2 22 12/03/2018 09:49 AM    CO2 26 11/16/2018 06:03 AM    CO2 26 11/15/2018 06:15 AM    CO2, i-STAT 7 (LL) 11/01/2018 04:30 PM    Creatinine 0 95 12/03/2018 09:49 AM    Creatinine 1 39 (H) 11/16/2018 06:03 AM    Creatinine 1 37 (H) 11/15/2018 06:15 AM    AST 20 12/03/2018 09:49 AM    AST 65 (H) 11/01/2018 04:14 PM    AST 28 11/01/2018 12:15 PM    ALT 23 12/03/2018 09:49 AM    ALT 61 11/01/2018 04:14 PM    ALT 33 11/01/2018 12:15 PM    Albumin 3 0 (L) 12/03/2018 09:49 AM    Albumin 2 2 (L) 11/01/2018 04:14 PM    Albumin 2 6 (L) 11/01/2018 12:15 PM    HDL, Direct 33 (L) 10/12/2018 11:39 AM    HDL, Direct 36 (L) 06/28/2018 08:23 AM    HDL, Direct 37 (L) 03/15/2018 08:02 AM    Triglycerides 183 (H) 10/12/2018 11:39 AM    Triglycerides 120 06/28/2018 08:23 AM    Triglycerides 197 (H) 03/15/2018 08:02 AM           Imaging Studies: I have personally reviewed pertinent reports  Portions of the record may have been created with voice recognition software  Occasional wrong word or "sound a like" substitutions may have occurred due to the inherent limitations of voice recognition software  Read the chart carefully and recognize, using context, where substitutions have occurred

## 2018-12-06 ENCOUNTER — APPOINTMENT (OUTPATIENT)
Dept: PHYSICAL THERAPY | Facility: CLINIC | Age: 63
End: 2018-12-06
Payer: MEDICARE

## 2018-12-07 ENCOUNTER — TELEPHONE (OUTPATIENT)
Dept: NEPHROLOGY | Facility: CLINIC | Age: 63
End: 2018-12-07

## 2018-12-07 NOTE — TELEPHONE ENCOUNTER
----- Message from Michael Drummond DO sent at 12/6/2018 12:36 PM EST -----  Labs reviewed will discuss with patient at his follow-up appointment next week

## 2018-12-07 NOTE — TELEPHONE ENCOUNTER
Dr Romana Pradhan reviewed patient's labs  He will discuss results with the patient at next week's visit

## 2018-12-10 ENCOUNTER — OFFICE VISIT (OUTPATIENT)
Dept: NEPHROLOGY | Facility: CLINIC | Age: 63
End: 2018-12-10
Payer: MEDICARE

## 2018-12-10 ENCOUNTER — TELEPHONE (OUTPATIENT)
Dept: FAMILY MEDICINE CLINIC | Facility: CLINIC | Age: 63
End: 2018-12-10

## 2018-12-10 ENCOUNTER — HOSPITAL ENCOUNTER (OUTPATIENT)
Dept: SLEEP CENTER | Facility: HOSPITAL | Age: 63
Discharge: HOME | End: 2018-12-10
Payer: MEDICARE

## 2018-12-10 VITALS
DIASTOLIC BLOOD PRESSURE: 48 MMHG | HEART RATE: 89 BPM | WEIGHT: 222.8 LBS | BODY MASS INDEX: 31.9 KG/M2 | SYSTOLIC BLOOD PRESSURE: 158 MMHG | HEIGHT: 70 IN

## 2018-12-10 DIAGNOSIS — G47.33 OSA (OBSTRUCTIVE SLEEP APNEA): ICD-10-CM

## 2018-12-10 DIAGNOSIS — E87.2 METABOLIC ACIDOSIS: ICD-10-CM

## 2018-12-10 DIAGNOSIS — R33.9 URINARY RETENTION: ICD-10-CM

## 2018-12-10 DIAGNOSIS — R60.0 BILATERAL LEG EDEMA: ICD-10-CM

## 2018-12-10 DIAGNOSIS — E87.79 OTHER HYPERVOLEMIA: ICD-10-CM

## 2018-12-10 DIAGNOSIS — I44.2 COMPLETE HEART BLOCK (HCC): ICD-10-CM

## 2018-12-10 DIAGNOSIS — I10 ESSENTIAL HYPERTENSION: ICD-10-CM

## 2018-12-10 DIAGNOSIS — N18.30 TYPE 2 DIABETES MELLITUS WITH STAGE 3 CHRONIC KIDNEY DISEASE, WITHOUT LONG-TERM CURRENT USE OF INSULIN (HCC): ICD-10-CM

## 2018-12-10 DIAGNOSIS — E11.40 TYPE 2 DIABETES MELLITUS WITH DIABETIC NEUROPATHY, WITHOUT LONG-TERM CURRENT USE OF INSULIN (HCC): ICD-10-CM

## 2018-12-10 DIAGNOSIS — M1A.9XX1 CHRONIC GOUT WITH TOPHUS, UNSPECIFIED CAUSE, UNSPECIFIED SITE: ICD-10-CM

## 2018-12-10 DIAGNOSIS — N18.30 CHRONIC KIDNEY DISEASE, STAGE III (MODERATE) (HCC): Primary | ICD-10-CM

## 2018-12-10 DIAGNOSIS — E11.22 TYPE 2 DIABETES MELLITUS WITH STAGE 3 CHRONIC KIDNEY DISEASE, WITHOUT LONG-TERM CURRENT USE OF INSULIN (HCC): ICD-10-CM

## 2018-12-10 DIAGNOSIS — N28.9 NEPHROPATHY: ICD-10-CM

## 2018-12-10 PROCEDURE — 99214 OFFICE O/P EST MOD 30 MIN: CPT | Performed by: INTERNAL MEDICINE

## 2018-12-10 PROCEDURE — 95811 POLYSOM 6/>YRS CPAP 4/> PARM: CPT

## 2018-12-10 RX ORDER — LOSARTAN POTASSIUM 50 MG/1
50 TABLET ORAL 2 TIMES DAILY
Qty: 180 TABLET | Refills: 3 | Status: SHIPPED | OUTPATIENT
Start: 2018-12-10 | End: 2018-12-10 | Stop reason: SDUPTHER

## 2018-12-10 RX ORDER — LOSARTAN POTASSIUM 50 MG/1
75 TABLET ORAL DAILY
Qty: 180 TABLET | Refills: 3 | Status: ON HOLD | OUTPATIENT
Start: 2018-12-10 | End: 2019-05-23 | Stop reason: SDUPTHER

## 2018-12-10 RX ORDER — LINAGLIPTIN 5 MG/1
5 TABLET, FILM COATED ORAL DAILY
COMMUNITY
Start: 2018-12-10 | End: 2019-01-11 | Stop reason: ALTCHOICE

## 2018-12-10 RX ORDER — FUROSEMIDE 40 MG/1
40 TABLET ORAL 2 TIMES DAILY
Qty: 180 TABLET | Refills: 3 | Status: SHIPPED | OUTPATIENT
Start: 2018-12-10 | End: 2019-01-11 | Stop reason: SDUPTHER

## 2018-12-10 NOTE — PROGRESS NOTES
OFFICE follow-up- Nephrology   Keshia Graves 70 y o  male MRN: [id number]    Encounter: [id number]          ASSESSMENT and PLAN:  Kaitlynn Rao was seen today for an initial consultation    He is 70years old with a past medical history of type 2 diabetes mellitus times 20 years, hypertension that is uncontrolled, gout with no recent attack, hyperlipidemia who presents for evaluation of proteinuria  Most recent prolonged hospitalization for complete heart block status post pacemaker complicated by acute kidney injury and volume overload now improved    Diagnoses and all orders for this visit:    Essential (primary) hypertension  - blood pressures at home have been around 122-797 systolic over 60  -he otherwise has been feeling well does still have some increased edema  -creatinine down to 0 9 and also some rales on exam increased lower extremity edema weight is stable around 215  -increase losartan to 75 mg daily, increase furosemide to 40 mg twice daily  -goal blood pressure will be 130 to 140 over 60 given his history of aortic stenosis and type 2 diabetes    Persistent proteinuria  - in the setting of prolonged diabetes  -he does have nephrotic range proteinuria serologic workup is negative and his creatinine is stable  -now with 4 g of protein, serologic workup was negative creatinine is stable no need for renal biopsy as this will likely not change the outcome  -continue renin angiotensin aldosterone system blockade    Nephropathy  -serologies negative will monitor at this point    Gout  - on allopurinol 300 mg daily  - has been gout free, will monitor and continue    Bilateral leg edema  - currently stable with a history of nephrotic range proteinuria  -rales on exam to so will increases ferrous to 40 twice daily    DISCUSSION:    Patient Instructions     Patient Instructions   RATNA Llanes here for follow-up after your most recent hospitalization regarding your kidney function  We recommend the followin   Continue good blood sugar control  2  Blood pressures are elevated in the office today but at home are slightly above goal will target a blood pressure of 130/60     3  Increase her losartan to 75 mg daily and increase her furosemide to 40 mg twice daily  4  Continue to target a weight of about 215 lb at home and if her weight is 5 lb lower than this for 5 lb above this please give me a call  5  Continue to check her blood pressure daily and if your target blood pressure is not achieved please let me know  6  If everything else is stable we can follow up in 6 months      follow-up in 6 months    HPI:  Florence Garcia is a 70 y  o male who was referred by Berta Fuentes for evaluation of  Initial evaluation    He is 57years old and has a history of type 2 diabetes mellitus currently on oral medications and follows up with Endocrinology, it was noticed that he had a microalbumin to creatinine ratio that was elevated  He had a most recent hospitalization [place] for complete heart block volume overload and acute kidney injury at that time he was treated for his volume overloaded had a temporary pacemaker eventually a permanent pacemaker was placed his creatinine had increased but plateaued around 6 5-7 3 and his volume status improved post pace maker placement a did have urinary retention and Jones catheter was placed his blood sugars were stable his breathing improved significantly and he was discharged home his blood pressures at home have been well controlled and otherwise he feels well without any complaints his weight has been around 217 lb +/-1    I personally spent over half of a total 60 minutes face to face with the patient in counseling and discussion and/or coordination of care as described above  ROS: All the systems were reviewed and were negative except as documented on the HPI  Allergies: Sandra Revering; Lamisil [terbinafine]; Lamisil [terbinafine]; Other; Latex;  Latex; and Other    Medications: Current Outpatient Prescriptions:     allopurinol (ZYLOPRIM) 300 mg tablet, TAKE 1 TABLET DAILY  , Disp: 90 tablet, Rfl: 1    amLODIPine (NORVASC) 10 mg tablet, Take 1 tablet (10 mg total) by mouth daily, Disp: 90 tablet, Rfl: 3    apixaban (ELIQUIS) 5 mg, Take 1 tablet (5 mg total) by mouth 2 (two) times a day, Disp: 60 tablet, Rfl: 3    betamethasone valerate (VALISONE) 0 1 % cream, APPLY TO BACK,ARMS,CHEST,LEGS AND ABDOMEN TWICE A DAY, Disp: , Rfl: 3    Cholecalciferol (VITAMIN D-3) 1000 units CAPS, Take 1 capsule by mouth every morning  , Disp: , Rfl:     furosemide (LASIX) 40 mg tablet, Take 1 tablet (40 mg total) by mouth 2 (two) times a day, Disp: 180 tablet, Rfl: 3    glimepiride (AMARYL) 4 mg tablet, TAKE 1 TABLET BY MOUTH TWICE A DAY, Disp: 60 tablet, Rfl: 3    glucose blood (ONE TOUCH ULTRA TEST) test strip, 1 each by Other route 3 (three) times a day, Disp: 100 each, Rfl: 3    hydrOXYzine HCL (ATARAX) 10 mg tablet, Take 10 mg by mouth every 6 (six) hours as needed  , Disp: , Rfl:     Insulin Degludec-Liraglutide (XULTOPHY) 100 units-3 6 mg/mL injection pen, Inject 30 Units under the skin daily, Disp: , Rfl:     losartan (COZAAR) 50 mg tablet, Take 1 5 tablets (75 mg total) by mouth daily, Disp: 180 tablet, Rfl: 3    metFORMIN (GLUCOPHAGE) 500 mg tablet, Take 2 tablets (1,000 mg total) by mouth 2 (two) times a day with meals [id number] /twice a day, Disp: 360 tablet, Rfl: 3    metoprolol tartrate (LOPRESSOR) 50 mg tablet, Take 1 tablet (50 mg total) by mouth every 12 (twelve) hours, Disp: 180 tablet, Rfl: 3    Multiple Vitamin (MULTIVITAMIN) tablet, Take 1 tablet by mouth daily, Disp: , Rfl:     omega-3-acid ethyl esters (LOVAZA) 1 g capsule, Take 2 capsules (2 g total) by mouth 2 (two) times a day, Disp: 360 capsule, Rfl: 3    simvastatin (ZOCOR) 20 mg tablet, Take 1 tablet (20 mg total) by mouth daily at bedtime, Disp: 90 tablet, Rfl: 3    tamsulosin (FLOMAX) 0 4 mg, Take 1 capsule (0 4 mg total) by mouth daily with dinner, Disp: 30 capsule, Rfl: 0    TRADJENTA 5 MG TABS, Take 5 mg by mouth daily  , Disp: , Rfl:     Insulin Degludec-Liraglutide (XULTOPHY) 100 units-3 6 mg/mL injection pen, Inject 30 Units under the skin daily for 90 days, Disp: 30 pen, Rfl: 0    Past Medical History:   Diagnosis Date    Arthritis     OA    Bruise of both arms     forearms and both hands    Bruises easily     Diabetes mellitus ([address]     [facility]abetic foot ulcer [facility] [address] [facility]     Eczema     Erectile dysfunction     Gout     Hyperlipidemia     Hypertension     Murmur     Nephropathy     Osteoarthritis     [facility]     Seasonal allergies     Toe infection     bilat great toes    Vertigo     Walks frequently     Wears dentures     upper    Wears glasses      Past Surgical History:   Procedure Laterality Date    CARPAL TUNNEL RELEASE Left     CARPAL TUNNEL RELEASE Right     CARPAL TUNNEL RELEASE      KNEE ARTHROSCOPY Left     KNEE ARTHROSCOPY Right     KNEE SURGERY      RI AMPUTATION TOE,I-P JT Bilateral 5/2/2017    Procedure: PARTIAL AMPUTATION RIGHT AND LEFT HALLUX ;  Surgeon: Yovanny Parada DPM;  Location: [facility];  Service: Podiatry    RI AMPUTATION TOE,MT-P JT Left 7/25/2017    Procedure: 2ND TOE AMPUTATION;  Surgeon: Yovanny Parada DPM;  Location: [facility];  Service: Podiatry    SHOULDER ARTHROSCOPY Left     with screws,RTC    SHOULDER SURGERY      VASECTOMY       Family History   Problem Relation Age of Onset    Heart disease Father     No Known Problems Mother     Hypertension Father     Pulmonary embolism Father       reports that he has quit smoking  His smoking use included Cigarettes  He has a 30 00 pack-year smoking history  He has never used smokeless tobacco  He reports that he does not drink alcohol or use drugs        Physical Exam:   Vitals:    12/10/18 0924 12/10/18 0928 12/10/18 0930 12/10/18 0932   BP:  161/61 150/61 (!) 158/48   BP Location: Left arm Left arm Left arm   Patient Position:  Sitting Sitting Sitting   Cuff Size:  Large Large Large   Pulse:  92 91 89   Weight: 101 kg (222 lb 12 8 oz)      Height: 5' 10" (1 778 m)        Body mass index is 31 97 kg/m²      General: conscious, cooperative, in not acute distress  Eyes: conjunctivae pink, anicteric sclerae  ENT: lips and mucous membranes moist  Neck: supple, no JVD  Chest: clear breath sounds bilateral, no crackles, ronchus or wheezings  CVS: distinct S1 & S2, normal rate, regular rhythm  Abdomen: soft, non-tender, non-distended, normoactive bowel sounds  Extremities:  1+ edema  Skin: no rash  Neuro: awake, alert, oriented          Lab Results:     Results for orders placed or performed in visit on 12/03/18   Comprehensive metabolic panel   Result Value Ref Range    Sodium 136 136 - 145 mmol/L    Potassium 5 1 3 5 - 5 3 mmol/L    Chloride 107 100 - 108 mmol/L    CO2 22 21 - 32 mmol/L    ANION GAP 7 4 - 13 mmol/L    BUN 40 (H) 5 - 25 mg/dL    Creatinine 0 95 0 60 - 1 30 mg/dL    Glucose, Fasting 82 65 - 99 mg/dL    Calcium 8 9 8 3 - 10 1 mg/dL    AST 20 5 - 45 U/L    ALT 23 12 - 78 U/L    Alkaline Phosphatase 102 46 - 116 U/L    Total Protein 7 0 6 4 - 8 2 g/dL    Albumin 3 0 (L) 3 5 - 5 0 g/dL    Total Bilirubin 0 52 0 20 - 1 00 mg/dL    eGFR 84 ml/min/1 73sq m   CBC and differential   Result Value Ref Range    WBC 10 91 (H) 4 31 - 10 16 Thousand/uL    RBC 3 36 (L) 3 88 - 5 62 Million/uL    Hemoglobin 10 1 (L) 12 0 - 17 0 g/dL    Hematocrit 31 5 (L) 36 5 - 49 3 %    MCV 94 82 - 98 fL    MCH 30 1 26 8 - 34 3 pg    MCHC 32 1 31 4 - 37 4 g/dL    RDW 14 5 11 6 - 15 1 %    MPV 9 6 8 9 - 12 7 fL    Platelets 648 916 - 001 Thousands/uL    nRBC 0 /100 WBCs    Neutrophils Relative 69 43 - 75 %    Immat GRANS % 1 0 - 2 %    Lymphocytes Relative 12 (L) 14 - 44 %    Monocytes Relative 7 4 - 12 %    Eosinophils Relative 10 (H) 0 - 6 %    Basophils Relative 1 0 - 1 %    Neutrophils Absolute 7 67 (H) 1 85 - 7 62 Thousands/µL    Immature Grans Absolute 0 05 0 00 - 0 20 Thousand/uL    Lymphocytes Absolute 1 30 0 60 - 4 47 Thousands/µL    Monocytes Absolute 0 72 0 17 - 1 22 Thousand/µL    Eosinophils Absolute 1 09 (H) 0 00 - 0 61 Thousand/µL    Basophils Absolute 0 08 0 00 - 0 10 Thousands/µL   Urinalysis with microscopic   Result Value Ref Range    Clarity, UA Clear     Color, UA Yellow     Specific Basco, UA 1 015 1 003 - 1 030    pH, UA 6 0 4 5 - 8 0    Glucose, UA Negative Negative mg/dl    Ketones, UA Negative Negative mg/dl    Blood, UA Moderate (A) Negative    Protein,  (3+) (A) Negative mg/dl    Nitrite, UA Negative Negative    Bilirubin, UA Negative Negative    Urobilinogen, UA 0 2 0 2, 1 0 E U /dl E U /dl    Leukocytes, UA Negative Negative    WBC, UA None Seen None Seen, 0-5, 5-55, 5-65 /hpf    RBC, UA 4-10 (A) None Seen, 0-5 /hpf    Hyaline Casts, UA None Seen None Seen /lpf    Bacteria, UA None Seen None Seen, Occasional /hpf    Epithelial Cells None Seen None Seen, Occasional /hpf   Protein / creatinine ratio, urine   Result Value Ref Range    Creatinine, Ur 83 3 mg/dL    Protein Urine Random 377 mg/dL    Prot/Creat Ratio, Ur 4 53 (H) 0 00 - 0 10   Magnesium   Result Value Ref Range    Magnesium 2 3 1 6 - 2 6 mg/dL   Phosphorus   Result Value Ref Range    Phosphorus 4 4 (H) 2 3 - 4 1 mg/dL       Echocardiogram 8/9/15  - left ventricle had an EF of 60% wall thickness was mildly increased with mild concentric hypertrophy and an abnormal left ventricular relaxation  - left atrium was mildly to moderately dilated  - right atrium was mildly dilated  - mitral valve shows mild annular calcification    Portions of the record may have been created with voice recognition software  Occasional wrong word or "sound a like" substitutions may have occurred due to the inherent limitations of voice recognition software  Read the chart carefully and recognize, using context, where substitutions have occurred  If you have any questions, please contact the dictating provider

## 2018-12-10 NOTE — PATIENT INSTRUCTIONS
Tramaine Cisse here for follow-up after your most recent hospitalization regarding your kidney function  We recommend the followin  Continue good blood sugar control  2  Blood pressures are elevated in the office today but at home are slightly above goal will target a blood pressure of 130/60     3  Increase her losartan to 75 mg daily and increase her furosemide to 40 mg twice daily  4  Continue to target a weight of about 215 lb at home and if her weight is 5 lb lower than this for 5 lb above this please give me a call  5  Continue to check her blood pressure daily and if your target blood pressure is not achieved please let me know  6   If everything else is stable we can follow up in 6 months

## 2018-12-10 NOTE — TELEPHONE ENCOUNTER
Suman Singh sent this message through [employer] in regards to [facility]:      "Dr Aly Salazar  You  referred [facility] to see a psychologist for therapy for his anxiety as witnessed at his PT Eval  When I called the Psychologist's office to schedule an appt  I was told that "the doctors in our practice have no availability for Medicare patients  They are totally full for Medicare patients and we have no idea when we will have any openings for Medicare patients so he needs to be referred to another practice that works with Medicare patients and are taking new Medicare patients"  With this said, [facility] says he is going to try to work thru this anxiety that he has, working with the PT staff, and he doesn't want a new referral for psych services, at least not at this time  We will be in touch if he changes his mind   Clifford Gross"

## 2018-12-10 NOTE — LETTER
December 10, 2018     Steve Dominique, [address] [place] [address]    Patient: Kimi Santos   YOB: 1955   Date of Visit: 12/10/2018       Dear Dr Kai Loo: Thank you for referring Kimi Santos to me for evaluation  Below are my notes for this consultation  If you have questions, please do not hesitate to call me  I look forward to following your patient along with you           Sincerely,        Moise Arias,         CC: No Recipients

## 2018-12-11 ENCOUNTER — APPOINTMENT (OUTPATIENT)
Dept: PHYSICAL THERAPY | Facility: CLINIC | Age: 63
End: 2018-12-11
Payer: MEDICARE

## 2018-12-13 ENCOUNTER — OFFICE VISIT (OUTPATIENT)
Dept: PHYSICAL THERAPY | Facility: CLINIC | Age: 63
End: 2018-12-13
Payer: MEDICARE

## 2018-12-13 DIAGNOSIS — R53.1 WEAKNESS GENERALIZED: Primary | ICD-10-CM

## 2018-12-13 PROCEDURE — 97110 THERAPEUTIC EXERCISES: CPT | Performed by: PHYSICAL THERAPIST

## 2018-12-13 NOTE — PROGRESS NOTES
Daily Note     Today's date: 2018  Patient name: Shane Welch  : 1955  MRN: [id number]  Referring provider: Brittney Simpson DO  Dx:   Encounter Diagnosis     ICD-10-CM    1  Weakness generalized R53 1                   Subjective: Reports difficulty getting in with behavior health  6 month waiting period  He has been walking his dog at home and going up and down the stairs more frequently  He continues to have apprehension but is willing to give PT a try  Objective: See treatment diary below      Assessment: Tolerated treatment well  Patient would benefit from continued PT  Tolerated tx well today which consisted of bike, PWB squats and james core strengthening exercises  Recommended a counselor for him to contact  Reported feeling less apprehension post tx      Plan: Progress treatment as tolerated         Precautions: pacemaker    Daily Treatment Diary       Manuals                                                                 Exercise Diary                           bike 10            Clam shells 10x10" b/l            bridges 20x10"            VG 40% 30x 30x                                                                                                                                                                                                                                         Modalities

## 2018-12-14 ENCOUNTER — OFFICE VISIT (OUTPATIENT)
Dept: PHYSICAL THERAPY | Facility: CLINIC | Age: 63
End: 2018-12-14
Payer: MEDICARE

## 2018-12-14 ENCOUNTER — TELEPHONE (OUTPATIENT)
Dept: OTHER | Facility: HOSPITAL | Age: 63
End: 2018-12-14

## 2018-12-14 ENCOUNTER — APPOINTMENT (OUTPATIENT)
Dept: PHYSICAL THERAPY | Facility: CLINIC | Age: 63
End: 2018-12-14
Payer: MEDICARE

## 2018-12-14 DIAGNOSIS — R53.1 WEAKNESS GENERALIZED: Primary | ICD-10-CM

## 2018-12-14 DIAGNOSIS — G47.33 OSA (OBSTRUCTIVE SLEEP APNEA): Primary | ICD-10-CM

## 2018-12-14 PROCEDURE — 97110 THERAPEUTIC EXERCISES: CPT | Performed by: PHYSICAL THERAPIST

## 2018-12-14 NOTE — PROGRESS NOTES
Daily Note     Today's date: 2018  Patient name: Garwin Brunner  : 1955  MRN: [id number]  Referring provider: Lincoln Stevens DO  Dx:   Encounter Diagnosis     ICD-10-CM    1  Weakness generalized R53 1                   Subjective: Reports decreased apprehension to exercising and PT  Mild stiffness in his legs reported      Objective: See treatment diary below      Assessment: Tolerated treatment well  Patient would benefit from continued PT  Plan: Progress treatment as tolerated         Precautions: pacemaker    Daily Treatment Diary       Manuals                                                                Exercise Diary                           bike 10 10           Clam shells 10x10" b/l 10x10"           bridges 20x10" 20x10"           VG 50% 30x 30x 30x           iso hip flexion   10x10" each                                                                                                                                                                                                                           Modalities

## 2018-12-14 NOTE — TELEPHONE ENCOUNTER
Telephone note- Pulmonary Medicine   Tasneem Adair 70 y o  male MRN: [id number]    Reason for call:    CPAP study results      Pertinent details:    I attempted to contact Gloria Jim with sleep study results  I left a message to contact office to set up supplies  I have contacted the office to start process to obtain CPAP supplies    Order placed in chart    [address]

## 2018-12-18 ENCOUNTER — PATIENT OUTREACH (OUTPATIENT)
Dept: FAMILY MEDICINE CLINIC | Facility: CLINIC | Age: 63
End: 2018-12-18

## 2018-12-18 ENCOUNTER — OFFICE VISIT (OUTPATIENT)
Dept: PHYSICAL THERAPY | Facility: CLINIC | Age: 63
End: 2018-12-18
Payer: MEDICARE

## 2018-12-18 DIAGNOSIS — R53.1 WEAKNESS GENERALIZED: Primary | ICD-10-CM

## 2018-12-18 PROCEDURE — 97110 THERAPEUTIC EXERCISES: CPT

## 2018-12-18 NOTE — PROGRESS NOTES
Diya Ayers is managing well at home and is knowledgeable of heart failure management  He states his edema is almost completely gone, he has no issues with chest pain, sob or weight gain  He is checking blood glucose twice daily  and results are averaging 130, no hypoglycemic episodes  Nutritional intake adequate, he is following low sodium, diabetic diet  He is  attending outpatient PT and feels he is gaining strength  He is taking all medications and f/u appointments are scheduled  He feels he is managing well at home and declines further outreach  Will close to case management

## 2018-12-18 NOTE — PROGRESS NOTES
Daily Note     Today's date: 2018  Patient name: Radha Gracia  : 1955  MRN: [id number]  Referring provider: Tomás Zambrano DO  Dx:   Encounter Diagnosis     ICD-10-CM    1  Weakness generalized R53 1                   Subjective:  Pt reports b/l LE muscle soreness after last visit  Pt reports walking approx 1/2 mile daily and compliance with HEP  Objective: See treatment diary below      Assessment: Tolerated treatment well  Decreased apprehension with ex  Added treadmill per pt request secondary to not walking this AM   Minimal LE muscle fatigue reported post treatment  Pt demonstrated proper technique with ex program   Patient would benefit from continued PT  Plan: Progress treatment as tolerated         Precautions: pacemaker    Daily Treatment Diary       Manuals                                                               Exercise Diary                           bike 10 10 10'          Clam shells 10x10" b/l 10x10" 10'x10 ea          bridges 20x10" 20x10" 10"x  20          VG 50% 30x 30x 30x x30          iso hip flexion   10x10" each 10'x10 ea          treadmill   1 5 mph x5 min                                                                                                                                                                                                              Modalities

## 2018-12-19 DIAGNOSIS — R33.9 URINARY RETENTION: ICD-10-CM

## 2018-12-19 RX ORDER — TAMSULOSIN HYDROCHLORIDE 0.4 MG/1
0.4 CAPSULE ORAL
Qty: 90 CAPSULE | Refills: 0 | Status: SHIPPED | OUTPATIENT
Start: 2018-12-19 | End: 2019-03-19 | Stop reason: SDUPTHER

## 2018-12-19 NOTE — TELEPHONE ENCOUNTER
Jaime Santos sent a message through [employer] requesting 90 day supply for Flomax to go to [facility] in [place]   Originally prescribed by supervising physician in ER

## 2018-12-20 ENCOUNTER — OFFICE VISIT (OUTPATIENT)
Dept: PHYSICAL THERAPY | Facility: CLINIC | Age: 63
End: 2018-12-20
Payer: MEDICARE

## 2018-12-20 DIAGNOSIS — R53.1 WEAKNESS GENERALIZED: Primary | ICD-10-CM

## 2018-12-20 PROCEDURE — 97110 THERAPEUTIC EXERCISES: CPT

## 2018-12-20 NOTE — PROGRESS NOTES
Daily Note     Today's date: 2018  Patient name: Anne Marie Bhandari  : 1955  MRN: [id number]  Referring provider: Thomas Mccurdy DO  Dx:   Encounter Diagnosis     ICD-10-CM    1  Weakness generalized R53 1                   Subjective:  Pt reports walking approx 1 mile this AM   Pt cont's to report decrease apprehension with walking and exercises  Objective: See treatment diary below      Assessment:  Good tolerance to therapeutic ex  Pt reports difficulty yet with clamshell ex and moderate LE muscle fatigue post treatment  Pt demonstrated proper technique with ex program   Patient would benefit from continued PT  Plan: Progress treatment as tolerated         Precautions: pacemaker    Daily Treatment Diary       Manuals                                                              Exercise Diary                           bike 10 10 10' 10'         Clam shells 10x10" b/l 10x10" 10'x10 ea 10"x 10 ea         bridges 20x10" 20x10" 10"x  20 10"x 20         VG 50% 30x 30x 30x x30 5 min         iso hip flexion   10x10" each 10'x10 ea 10"x  10         treadmill   1 5 mph x5 min  np                                                                                                                                                                                                             Modalities

## 2018-12-26 ENCOUNTER — OFFICE VISIT (OUTPATIENT)
Dept: PHYSICAL THERAPY | Facility: CLINIC | Age: 63
End: 2018-12-26
Payer: MEDICARE

## 2018-12-26 DIAGNOSIS — R53.1 WEAKNESS GENERALIZED: Primary | ICD-10-CM

## 2018-12-26 PROCEDURE — 97110 THERAPEUTIC EXERCISES: CPT | Performed by: PHYSICAL THERAPIST

## 2018-12-26 NOTE — PROGRESS NOTES
Daily Note     Today's date: 2018  Patient name: Harley Yanes  : 1955  MRN: [id number]  Referring provider: Gi Berg DO  Dx:   Encounter Diagnosis     ICD-10-CM    1  Weakness generalized R53 1                   Subjective:  Pt reports improved strength and endurance with home activity  Objective: See treatment diary below      Assessment:  Good tolerance to progression of strengthening ex with LE muscle fatigue reported post treatment  Minimal verbal cues required with ex for proper technique  Patient would benefit from continued PT  Plan: Progress treatment as tolerated         Precautions: pacemaker    Daily Treatment Diary       Manuals                                                             Exercise Diary                           bike 10 10 10' 10' 10'        Clam shells 10x10" b/l 10x10" 10'x10 ea 10"x 10 ea Red tband 10"x  10 ea        bridges 20x10" 20x10" 10"x  20 10"x 20 10"x  20        VG 50% 30x 30x 30x x30 5 min 8 min        iso hip flexion   10x10" each 10'x10 ea 10"x  10 10"x  10        treadmill   1 5 mph x5 min  np  np                                                                                                                                                                                                           Modalities

## 2018-12-31 ENCOUNTER — APPOINTMENT (OUTPATIENT)
Dept: PHYSICAL THERAPY | Facility: CLINIC | Age: 63
End: 2018-12-31
Payer: MEDICARE

## 2019-01-03 ENCOUNTER — OFFICE VISIT (OUTPATIENT)
Dept: PHYSICAL THERAPY | Facility: CLINIC | Age: 64
End: 2019-01-03
Payer: MEDICARE

## 2019-01-03 DIAGNOSIS — R53.1 WEAKNESS GENERALIZED: Primary | ICD-10-CM

## 2019-01-03 PROCEDURE — 97110 THERAPEUTIC EXERCISES: CPT | Performed by: PHYSICAL THERAPIST

## 2019-01-03 NOTE — PROGRESS NOTES
Daily Note     Today's date: 1/3/2019  Patient name: Kimberley Lombardo  : 1955  MRN: [id number]  Referring provider: Kam Szymanski DO  Dx:   Encounter Diagnosis     ICD-10-CM    1  Weakness generalized R53 1                   Subjective:  Pt reports feeling much better overall      Objective: See treatment diary below      Assessment:  Good tolerance to progression of strengthening ex with LE muscle fatigue reported post treatment  Minimal verbal cues required with ex for proper technique  Patient would benefit from continued PT  Plan: Progress treatment as tolerated         Precautions: pacemaker    Daily Treatment Diary       Manuals 12/13 12/14 12/18 12/20 12/26 1/3                                                           Exercise Diary                           bike 10 10 10' 10' 10' 10 min       Clam shells 10x10" b/l 10x10" 10'x10 ea 10"x 10 ea Red tband 10"x  10 ea Blue 10x10"       bridges 20x10" 20x10" 10"x  20 10"x 20 10"x  20 With blue band 20x5"       VG 50% 30x 30x 30x x30 5 min 8 min 8 min       iso hip flexion   10x10" each 10'x10 ea 10"x  10 10"x  10 10x10"       treadmill   1 5 mph x5 min  np  np np                                                                                                                                                                                                          Modalities

## 2019-01-07 ENCOUNTER — OFFICE VISIT (OUTPATIENT)
Dept: PHYSICAL THERAPY | Facility: CLINIC | Age: 64
End: 2019-01-07
Payer: MEDICARE

## 2019-01-07 DIAGNOSIS — R53.1 WEAKNESS GENERALIZED: Primary | ICD-10-CM

## 2019-01-07 PROCEDURE — 97110 THERAPEUTIC EXERCISES: CPT

## 2019-01-07 NOTE — PROGRESS NOTES
Daily Note     Today's date: 2019  Patient name: Anne Marie Bhandari  : 1955  MRN: [id number]  Referring provider: Thomas Mccurdy DO  Dx:   Encounter Diagnosis     ICD-10-CM    1  Weakness generalized R53 1                   Subjective:  Pt reports feeling much better overall with strength and endurance  Pt reports walking approx 2 miles this AM   No new c/o's  Objective: See treatment diary below      Assessment:  Good tolerance to progression of TE with moderate LE muscle fatigue reported  B/l knee discomfort reported with added resistance on recumbent bike  Patient would benefit from continued PT  Plan: Progress treatment as tolerated         Precautions: pacemaker    Daily Treatment Diary       Manuals 12/13 12/14 12/18 12/20 12/26 1/3 1/7                                                          Exercise Diary                           bike 10 10 10' 10' 10' 10 min 10 min (L1 4 min)      Clam shells 10x10" b/l 10x10" 10'x10 ea 10"x 10 ea Red tband 10"x  10 ea Blue 10x10" Blue 10"x  10 ea      bridges 20x10" 20x10" 10"x  20 10"x 20 10"x  20 With blue band 20x5" Blue tband 5"x20      VG 50% 30x 30x 30x x30 5 min 8 min 8 min 8 min      iso hip flexion   10x10" each 10'x10 ea 10"x  10 10"x  10 10x10" 10'x10      treadmill   1 5 mph x5 min  np  np np np                                                                                                                                                                                                         Modalities

## 2019-01-10 ENCOUNTER — OFFICE VISIT (OUTPATIENT)
Dept: PHYSICAL THERAPY | Facility: CLINIC | Age: 64
End: 2019-01-10
Payer: MEDICARE

## 2019-01-10 ENCOUNTER — APPOINTMENT (OUTPATIENT)
Dept: PHYSICAL THERAPY | Facility: CLINIC | Age: 64
End: 2019-01-10
Payer: MEDICARE

## 2019-01-10 DIAGNOSIS — R53.1 WEAKNESS GENERALIZED: Primary | ICD-10-CM

## 2019-01-10 PROCEDURE — 97110 THERAPEUTIC EXERCISES: CPT

## 2019-01-10 NOTE — PROGRESS NOTES
Daily Note     Today's date: 1/10/2019  Patient name: Keshia Graves  : 1955  MRN: [id number]  Referring provider: Aarti Miranda DO  Dx:   Encounter Diagnosis     ICD-10-CM    1  Weakness generalized R53 1                   Subjective:  Pt cont's to report improvement overall with strength and endurance  No new c/o's  Objective: See treatment diary below      Assessment:  Good tolerance to progression of TE with minimal LE muscle fatigue and b/l knee stiffness reported post treatment   Pt cont's to demonstrate improved LE strength and endurance  Pt has good understanding of ex program and demonstrates proper technique  Patient would benefit from continued PT  Plan: Progress treatment as tolerated         Precautions: pacemaker    Daily Treatment Diary       Manuals 12/13 12/14 12/18 12/20 12/26 1/3 1/7 1/10                                                         Exercise Diary                           bike 10 10 10' 10' 10' 10 min 10 min (L1 4 min) 10 min (L1 x5 min)     Clam shells 10x10" b/l 10x10" 10'x10 ea 10"x 10 ea Red tband 10"x  10 ea Blue 10x10" Blue 10"x  10 ea Blue 10"x  10 ea     bridges 20x10" 20x10" 10"x  20 10"x 20 10"x  20 With blue band 20x5" Blue tband 5"x20 Blue 10"x  15     VG 50% 30x 30x 30x x30 5 min 8 min 8 min 8 min 8 min     iso hip flexion   10x10" each 10'x10 ea 10"x  10 10"x  10 10x10" 10'x10 10"x  10     treadmill   1 5 mph x5 min  np  np np np np                                                                                                                                                                                                        Modalities

## 2019-01-11 ENCOUNTER — OFFICE VISIT (OUTPATIENT)
Dept: CARDIOLOGY CLINIC | Facility: CLINIC | Age: 64
End: 2019-01-11
Payer: MEDICARE

## 2019-01-11 VITALS
HEIGHT: 70 IN | WEIGHT: 216 LBS | SYSTOLIC BLOOD PRESSURE: 144 MMHG | HEART RATE: 88 BPM | BODY MASS INDEX: 30.92 KG/M2 | DIASTOLIC BLOOD PRESSURE: 60 MMHG

## 2019-01-11 DIAGNOSIS — I48.0 PAROXYSMAL ATRIAL FIBRILLATION (HCC): ICD-10-CM

## 2019-01-11 DIAGNOSIS — E78.49 OTHER HYPERLIPIDEMIA: ICD-10-CM

## 2019-01-11 DIAGNOSIS — I50.32 CHRONIC DIASTOLIC (CONGESTIVE) HEART FAILURE (HCC): ICD-10-CM

## 2019-01-11 DIAGNOSIS — I44.2 COMPLETE HEART BLOCK (HCC): Primary | ICD-10-CM

## 2019-01-11 DIAGNOSIS — I10 ESSENTIAL HYPERTENSION: ICD-10-CM

## 2019-01-11 PROCEDURE — 99214 OFFICE O/P EST MOD 30 MIN: CPT | Performed by: INTERNAL MEDICINE

## 2019-01-11 RX ORDER — FUROSEMIDE 40 MG/1
40 TABLET ORAL DAILY
Qty: 180 TABLET | Refills: 0 | Status: ON HOLD
Start: 2019-01-11 | End: 2019-05-08 | Stop reason: SDUPTHER

## 2019-01-11 NOTE — PROGRESS NOTES
Cardiology Follow Up    Chidi Herrera  1955  [id number]  [address] [id number] [phone]    Reason for visit:  6 week FU for CHB with shock late last year s/p PPM  Also has Chronic diastolic CHF with LE edema, PAF seen in hospital, HTN and HLP    1  Complete heart block (HCC)     2  Paroxysmal atrial fibrillation ([facility] )     3  Chronic diastolic (congestive) heart failure ([facility] )     4  Essential hypertension     5  Other hyperlipidemia         Interval History:  Since the patient's last visit, he is more active  His edema is better  His breathing is fine  He has had no chest pain  He has some positional lightheadedness  He has had no palpitations      Patient Active Problem List   Diagnosis    Bilateral leg edema    Diabetes mellitus with neuropathy ([facility] )    Diabetic foot ulcer ([facility] )    DM type 2, not at goal [facility])    Easy bruising    Eczema    Erectile dysfunction of non-organic origin    Gout    Hyperlipidemia    Nephropathy    Osteoarthritis    Peripheral arterial disease (HCC)    PVCs (premature ventricular contractions)    Rhinitis    Systolic murmur    Vertigo    Complete heart block (HCC)    Metabolic acidosis    Idiopathic hypotension    Other hyperlipidemia    Sepsis (HCC)    Paroxysmal atrial fibrillation (HCC)    Acute respiratory failure with hypoxia (HCC)    Persistent proteinuria    Volume overload    Urinary retention    Third degree heart block (HCC)    NICOLASA (obstructive sleep apnea)    Chronic diastolic (congestive) heart failure (HCC)    Type 2 diabetes mellitus with chronic kidney disease, without long-term current use of insulin (HCC)    Essential hypertension    Chronic kidney disease, stage III (moderate) (HCC)    Nonrheumatic aortic valve stenosis     Past Medical History:   Diagnosis Date    Arthritis     OA    Bruise of both arms forearms and both hands    Bruises easily     Diabetes mellitus [facility]     Diabetic foot ulcer ([facility] )     Eczema     Erectile dysfunction     Gout     Hyperlipidemia     Hypertension     Murmur     Nephropathy     Osteoarthritis     [facility]     Seasonal allergies     Toe infection     bilat great toes    Vertigo     Walks frequently     Wears dentures     upper    Wears glasses      Social History     Social History    Marital status: /Civil Union     Spouse name: N/A    Number of children: N/A    Years of education: N/A     Occupational History    Not on file  Social History Main Topics    Smoking status: Former Smoker     Packs/day: 1 00     Years: 30 00     Types: Cigarettes    Smokeless tobacco: Never Used      Comment: quit 10 years ago    Alcohol use No    Drug use: No    Sexual activity: Not on file     Other Topics Concern    Not on file     Social History Narrative    ** Merged History Encounter **         Daily cola consumption (2 cans/day)      Family History   Problem Relation Age of Onset    Heart disease Father     No Known Problems Mother     Hypertension Father     Pulmonary embolism Father      Past Surgical History:   Procedure Laterality Date    CARPAL TUNNEL RELEASE Left     CARPAL TUNNEL RELEASE Right     CARPAL TUNNEL RELEASE      KNEE ARTHROSCOPY Left     KNEE ARTHROSCOPY Right     KNEE SURGERY      KY AMPUTATION TOE,I-P JT Bilateral 5/2/2017    Procedure: PARTIAL AMPUTATION RIGHT AND LEFT HALLUX ;  Surgeon: Taylor Valentine DPM;  Location: [facility];  Service: Podiatry    KY AMPUTATION TOE,MT-P JT Left 7/25/2017    Procedure: 2ND TOE AMPUTATION;  Surgeon: Taylor Valentine DPM;  Location: [facility];  Service: Podiatry    SHOULDER ARTHROSCOPY Left     with screws,RTC    SHOULDER SURGERY      VASECTOMY         Current Outpatient Prescriptions:     allopurinol (ZYLOPRIM) 300 mg tablet, TAKE 1 TABLET DAILY  , Disp: 90 tablet, Rfl: 1    amLODIPine (NORVASC) 10 mg tablet, Take 1 tablet (10 mg total) by mouth daily, Disp: 90 tablet, Rfl: 3    apixaban (ELIQUIS) 5 mg, Take 1 tablet (5 mg total) by mouth 2 (two) times a day, Disp: 60 tablet, Rfl: 3    betamethasone valerate (VALISONE) 0 1 % cream, APPLY TO BACK,ARMS,CHEST,LEGS AND ABDOMEN TWICE A DAY, Disp: , Rfl: 3    Cholecalciferol (VITAMIN D-3) 1000 units CAPS, Take 1 capsule by mouth every morning  , Disp: , Rfl:     furosemide (LASIX) 40 mg tablet, Take 1 tablet (40 mg total) by mouth 2 (two) times a day, Disp: 180 tablet, Rfl: 3    glimepiride (AMARYL) 4 mg tablet, TAKE 1 TABLET BY MOUTH TWICE A DAY, Disp: 60 tablet, Rfl: 3    glucose blood (ONE TOUCH ULTRA TEST) test strip, 1 each by Other route 3 (three) times a day, Disp: 100 each, Rfl: 3    hydrOXYzine HCL (ATARAX) 10 mg tablet, Take 10 mg by mouth every 6 (six) hours as needed  , Disp: , Rfl:     Insulin Degludec-Liraglutide (XULTOPHY) 100 units-3 6 mg/mL injection pen, Inject 30 Units under the skin daily, Disp: , Rfl:     losartan (COZAAR) 50 mg tablet, Take 1 5 tablets (75 mg total) by mouth daily, Disp: 180 tablet, Rfl: 3    metFORMIN (GLUCOPHAGE) 500 mg tablet, Take 2 tablets (1,000 mg total) by mouth 2 (two) times a day with meals [id number] /twice a day, Disp: 360 tablet, Rfl: 3    metoprolol tartrate (LOPRESSOR) 50 mg tablet, Take 1 tablet (50 mg total) by mouth every 12 (twelve) hours, Disp: 180 tablet, Rfl: 3    Multiple Vitamin (MULTIVITAMIN) tablet, Take 1 tablet by mouth daily, Disp: , Rfl:     omega-3-acid ethyl esters (LOVAZA) 1 g capsule, Take 2 capsules (2 g total) by mouth 2 (two) times a day, Disp: 360 capsule, Rfl: 3    simvastatin (ZOCOR) 20 mg tablet, Take 1 tablet (20 mg total) by mouth daily at bedtime, Disp: 90 tablet, Rfl: 3    tamsulosin (FLOMAX) 0 4 mg, Take 1 capsule (0 4 mg total) by mouth daily with dinner, Disp: 90 capsule, Rfl: 0    Insulin Degludec-Liraglutide (XULTOPHY) 100 units-3 6 mg/mL injection pen, Inject 30 Units under the skin daily for 90 days, Disp: 30 pen, Rfl: 0  Allergies   Allergen Reactions    Invokana [Canagliflozin]     Lamisil [Terbinafine] Rash    Lamisil [Terbinafine] Blisters     Wife states " His skin peeled from head to toe"    Other Swelling     Pomegranate - facial swelling, no swelling of tongue, esophagus  Adhesive tape   Latex Rash    Latex     Other Rash     palmegranate     Review of Systems:  Review of Systems   Constitutional: Positive for activity change (more active) and unexpected weight change (wt loss)  Negative for appetite change and fatigue  Respiratory: Negative for cough, choking, shortness of breath and wheezing  Cardiovascular: Positive for leg swelling (better)  Negative for chest pain and palpitations  Gastrointestinal: Negative for abdominal pain, blood in stool, constipation and diarrhea  Genitourinary: Positive for frequency  Negative for dysuria, hematuria and urgency  Musculoskeletal: Positive for arthralgias  Negative for back pain, joint swelling and neck pain  Cramps   Neurological: Positive for dizziness and light-headedness  Negative for numbness and headaches  Psychiatric/Behavioral: Negative for agitation, behavioral problems, confusion and decreased concentration  Physical Exam:  Vitals:    01/11/19 1328   BP: 144/60   BP Location: Left arm   Patient Position: Sitting   Cuff Size: Large   Pulse: 88   Weight: 98 kg (216 lb)   Height: 5' 10" (1 778 m)       Physical Exam   Constitutional: He is oriented to person, place, and time  He appears well-developed and well-nourished  No distress  obese   HENT:   Head: Normocephalic and atraumatic  Mouth/Throat: No oropharyngeal exudate  Eyes: No scleral icterus  Conjunctiva pale   Neck: Neck supple  Normal carotid pulses and no JVD present  Carotid bruit is not present  No thyromegaly present     Cardiovascular: Normal rate, regular rhythm and intact distal pulses  Exam reveals no gallop and no friction rub  Murmur heard  Crescendo decrescendo systolic (basal) murmur is present with a grade of 2/6    No diastolic murmur is present   Pulmonary/Chest: He has decreased breath sounds  He has no wheezes  He has no rhonchi  He has no rales  Abdominal: Soft  Bowel sounds are normal  He exhibits no mass  There is no hepatosplenomegaly  There is no tenderness  Musculoskeletal: He exhibits edema (1-2+ post ankle edema)  He exhibits no tenderness or deformity  Neurological: He is alert and oriented to person, place, and time  He has normal strength  No cranial nerve deficit or sensory deficit  Skin: Skin is warm and dry  No rash noted  No erythema  No pallor  Psychiatric: He has a normal mood and affect  His behavior is normal  Judgment and thought content normal        Discussion/Summary:  1  Complete heart block  Status post permanent pacemaker  Patient doing rather well  Normally functioning device  Will have another check next month  2  Paroxysmal atrial fibrillation  May have occurred in the setting of acute illness  Await defibrillator checks  Continue Eliquis but may be able to discontinue this if indeed he has no recurrent atrial fibrillation  On metoprolol for potential rate control  3  Chronic diastolic heart failure  Weight is down 11 lb and he has less edema  Is experiencing nocturnal hand cramps  Will reduce furosemide to 40 mg once daily  4  Hypertension  Reasonable control on losartan, amlodipine and metoprolol  Continue same  Patient is more active in exercising in this may aid in bringing down his blood pressure further  5  Hyperlipidemia  Patient on simvastatin   LDL reasonable in past on this dosage      FU 6 months      Marline Shen MD

## 2019-01-15 ENCOUNTER — OFFICE VISIT (OUTPATIENT)
Dept: PHYSICAL THERAPY | Facility: CLINIC | Age: 64
End: 2019-01-15
Payer: MEDICARE

## 2019-01-15 DIAGNOSIS — R53.1 WEAKNESS GENERALIZED: Primary | ICD-10-CM

## 2019-01-15 PROCEDURE — 97110 THERAPEUTIC EXERCISES: CPT

## 2019-01-15 NOTE — PROGRESS NOTES
Daily Note     Today's date: 1/15/2019  Patient name: Manuelito Handley  : 1955  MRN: [id number]  Referring provider: Eddie Felder DO  Dx:   Encounter Diagnosis     ICD-10-CM    1  Weakness generalized R53 1                   Subjective:  Pt cont's to report improvement overall with home activity and strength/endurance  Pt states 80% improvement since starting therapy  Objective: See treatment diary below      Assessment:  Good tolerance to progression of TE with cont'd c/o muscle fatigue  Pt cont's to demonstrate steady improvement with LE strength and endurance  Pt has good understanding of ex program and demonstrates proper technique  Patient would benefit from continued PT  Plan: Progress treatment as tolerated         Precautions: pacemaker    Daily Treatment Diary       Manuals 12/13 12/14 12/18 12/20 12/26 1/3 1/7 1/10 1/15                                                        Exercise Diary                           bike 10 10 10' 10' 10' 10 min 10 min (L1 4 min) 10 min (L1 x5 min) 10 min (L1 x6 min)    Clam shells 10x10" b/l 10x10" 10'x10 ea 10"x 10 ea Red tband 10"x  10 ea Blue 10x10" Blue 10"x  10 ea Blue 10"x  10 ea Blue 10"x 15    bridges 20x10" 20x10" 10"x  20 10"x 20 10"x  20 With blue band 20x5" Blue tband 5"x20 Blue 10"x  15 Blue 10"x 20    VG 50% 30x 30x 30x x30 5 min 8 min 8 min 8 min 8 min 8 min    iso hip flexion   10x10" each 10'x10 ea 10"x  10 10"x  10 10x10" 10'x10 10"x  10 10"x  10      treadmill   1 5 mph x5 min  np  np np np np np                                                                                                                                                                                                       Modalities

## 2019-01-17 ENCOUNTER — TELEPHONE (OUTPATIENT)
Dept: FAMILY MEDICINE CLINIC | Facility: CLINIC | Age: 64
End: 2019-01-17

## 2019-01-17 DIAGNOSIS — Z12.11 ENCOUNTER FOR SCREENING COLONOSCOPY: Primary | ICD-10-CM

## 2019-01-17 NOTE — TELEPHONE ENCOUNTER
Called patient and informed that colon cancer screening is due  Pt opted to do colonoscopy  Please place order for GI referral for colon cancer screening

## 2019-01-18 ENCOUNTER — OFFICE VISIT (OUTPATIENT)
Dept: PHYSICAL THERAPY | Facility: CLINIC | Age: 64
End: 2019-01-18
Payer: MEDICARE

## 2019-01-18 DIAGNOSIS — R53.1 WEAKNESS GENERALIZED: Primary | ICD-10-CM

## 2019-01-18 PROCEDURE — 97110 THERAPEUTIC EXERCISES: CPT

## 2019-01-18 NOTE — PROGRESS NOTES
Daily Note     Today's date: 2019  Patient name: Emani Washington  : 1955  MRN: [id number]  Referring provider: Jerrica Gar DO  Dx:   Encounter Diagnosis     ICD-10-CM    1  Weakness generalized R53 1                   Subjective:  Pt cont's to report improvement overall with home activity and strength/endurance  Pt states 80% improvement since starting therapy  Objective: See treatment diary below  FOTO outcome measure improved from 48 to 79 over 11 visits  Assessment:  Good tolerance to TE with moderate LE muscle fatigue reported post treatment  Pt cont's to demonstrate steady improvement with LE strength and endurance  Pt has good understanding of ex program and demonstrates proper technique  Patient would benefit from continued PT  Plan: Progress treatment as tolerated     Discussed with therapist (ND) and will continue PT x2 visits next week with possible transition to fitness program       Precautions: pacemaker    Daily Treatment Diary       Manuals 12/13 12/14 12/18 12/20 12/26 1/3 1/7 1/10 1/15                                                        Exercise Diary                           bike 10 10 10' 10' 10' 10 min 10 min (L1 4 min) 10 min (L1 x5 min) 10 min (L1 x6 min)    Clam shells 10x10" b/l 10x10" 10'x10 ea 10"x 10 ea Red tband 10"x  10 ea Blue 10x10" Blue 10"x  10 ea Blue 10"x  10 ea Blue 10"x 15    bridges 20x10" 20x10" 10"x  20 10"x 20 10"x  20 With blue band 20x5" Blue tband 5"x20 Blue 10"x  15 Blue 10"x 20    VG 50% 30x 30x 30x x30 5 min 8 min 8 min 8 min 8 min 8 min    iso hip flexion   10x10" each 10'x10 ea 10"x  10 10"x  10 10x10" 10'x10 10"x  10 10"x  10      treadmill   1 5 mph x5 min  np  np np np np np                                                                                                                                                                                                       Modalities

## 2019-01-22 ENCOUNTER — OFFICE VISIT (OUTPATIENT)
Dept: PHYSICAL THERAPY | Facility: CLINIC | Age: 64
End: 2019-01-22
Payer: MEDICARE

## 2019-01-22 DIAGNOSIS — R53.1 WEAKNESS GENERALIZED: Primary | ICD-10-CM

## 2019-01-22 PROCEDURE — 97110 THERAPEUTIC EXERCISES: CPT

## 2019-01-22 NOTE — PROGRESS NOTES
Daily Note     Today's date: 2019  Patient name: Ron Barton  : 1955  MRN: [id number]  Referring provider: Roby Nunes DO  Dx:   Encounter Diagnosis     ICD-10-CM    1  Weakness generalized R53 1                   Subjective:  Pt cont's to report improvement overall with home activity and strength/endurance  Objective: See treatment diary below  FOTO outcome measure improved from 48 to 79 over 11 visits  Assessment:  Good tolerance to progression of TE with moderate LE muscle fatigue reported  Pt cont's to demonstrate steady improvement with LE strength and endurance  Pt has good understanding of ex program and demonstrates proper technique  Patient would benefit from continued PT  Plan: Progress treatment as tolerated  Discussed with therapist (ND) and will continue PT x1 visit and transition to fitness program next week        Precautions: pacemaker    Daily Treatment Diary       Manuals 12/13 12/14 12/18 12/20 12/26 1/3 1/7 1/10 1/15 1/22                                                       Exercise Diary                           bike 10 10 10' 10' 10' 10 min 10 min (L1 4 min) 10 min (L1 x5 min) 10 min (L1 x6 min) 10 min L1   Clam shells 10x10" b/l 10x10" 10'x10 ea 10"x 10 ea Red tband 10"x  10 ea Blue 10x10" Blue 10"x  10 ea Blue 10"x  10 ea Blue 10"x 15 Blue 10"x  15 ea   bridges 20x10" 20x10" 10"x  20 10"x 20 10"x  20 With blue band 20x5" Blue tband 5"x20 Blue 10"x  15 Blue 10"x 20 Blue 10"x  20   VG 50% 30x 30x 30x x30 5 min 8 min 8 min 8 min 8 min 8 min 8 min   iso hip flexion   10x10" each 10'x10 ea 10"x  10 10"x  10 10x10" 10'x10 10"x  10 10"x  10   10"x  10   treadmill   1 5 mph x5 min  np  np np np np np np                                                                                                                                                                                                      Modalities

## 2019-01-24 ENCOUNTER — OFFICE VISIT (OUTPATIENT)
Dept: PHYSICAL THERAPY | Facility: CLINIC | Age: 64
End: 2019-01-24
Payer: MEDICARE

## 2019-01-24 DIAGNOSIS — R53.1 WEAKNESS GENERALIZED: Primary | ICD-10-CM

## 2019-01-24 PROCEDURE — 97110 THERAPEUTIC EXERCISES: CPT

## 2019-01-24 NOTE — PROGRESS NOTES
Daily Note/Discharge Summary     Today's date: 2019  Patient name: Yamilex Addison  : 1955  MRN: [id number]  Referring provider: Mukul Lion DO  Dx:   Encounter Diagnosis     ICD-10-CM    1  Weakness generalized R53 1                   Subjective:  Pt cont's to report improvement overall with home activity and strength/endurance  Pt states 90% improvement since starting therapy  Objective: See treatment diary below  Assessment:  Good tolerance to progression of TE  Pt has good understanding of ex program and demonstrates proper technique  Plan:  Discussed with therapist (ND) and will transition to fitness program next week        Precautions: pacemaker    Daily Treatment Diary       Manuals                                                                 Exercise Diary                           bike 10 min L1            Clam shells Blue 10"x 15 b/l            bridges 10"x  20            VG 50%  8 min            iso hip flexion  10"x  20            treadmill np            Leg press  60 lb x20                                                                                                                                                                                                  Modalities

## 2019-01-29 ENCOUNTER — APPOINTMENT (OUTPATIENT)
Dept: PHYSICAL THERAPY | Facility: CLINIC | Age: 64
End: 2019-01-29
Payer: MEDICARE

## 2019-01-31 ENCOUNTER — APPOINTMENT (OUTPATIENT)
Dept: PHYSICAL THERAPY | Facility: CLINIC | Age: 64
End: 2019-01-31
Payer: MEDICARE

## 2019-02-09 DIAGNOSIS — M10.9 GOUT, UNSPECIFIED CAUSE, UNSPECIFIED CHRONICITY, UNSPECIFIED SITE: ICD-10-CM

## 2019-02-09 RX ORDER — ALLOPURINOL 300 MG/1
300 TABLET ORAL EVERY MORNING
Qty: 90 TABLET | Refills: 2 | Status: SHIPPED | OUTPATIENT
Start: 2019-02-09 | End: 2020-02-21 | Stop reason: SDUPTHER

## 2019-02-15 ENCOUNTER — TELEPHONE (OUTPATIENT)
Dept: NEPHROLOGY | Facility: CLINIC | Age: 64
End: 2019-02-15

## 2019-02-15 NOTE — TELEPHONE ENCOUNTER
Regarding: RE: FW: Non-Urgent Medical Question  Contact: [phone]  ----- Message from [address] [place] [address] 951, [facility] sent at 2/15/2019  7:34 AM EST -----    Dr Tanisha Concepcion  Thank you very much for your response  Zelalem Arguello and Jaime Connell  ----- Message -----  From: Jessie Dhillon DO  Sent: 2019 10:56 AM EST  To: Tasneem Adair  Subject: RE: FW: Non-Urgent Medical Question  Jenifer Lynn Eleni,    He can proceed from my standpoint  Photo Therapy will be fine for his kidneys  No special precautions need to be taken  Let me know if there are any other questions  Sincerely,    Jessie DEL ANGEL O     ----- Message -----  From: Tasneem Adair  Sent: 2019  11:17 AM  To: [facility]  Subject: Non-Urgent Medical Question                      Good morning Dr Tanisha Concepcion  This is Stevphen Walker wife, Brennan's  51  His dermatologist wants to try PHOTO Therapy as a method of treatment for his persistent case of atopic dermatitis  He had been on the [employer] program which totally cleared him up however when he went on Medicare in 2018, he was no longer eligible for the program and the monthly shots are about $1300  Jennifer Mccrary doesn't want to get any more steroid shots because the relief from them is short term and they raise his blood sugar significantly  Jennifer Mccrary does have a pacemaker so we contacted [address] and we are also contacting you to see if you approve of him getting this PHOTO Therapy and if so, any precautions or suggestions? Thank you

## 2019-02-22 ENCOUNTER — OFFICE VISIT (OUTPATIENT)
Dept: FAMILY MEDICINE CLINIC | Facility: CLINIC | Age: 64
End: 2019-02-22
Payer: MEDICARE

## 2019-02-22 VITALS
RESPIRATION RATE: 16 BRPM | SYSTOLIC BLOOD PRESSURE: 134 MMHG | HEART RATE: 68 BPM | BODY MASS INDEX: 30.8 KG/M2 | HEIGHT: 71 IN | DIASTOLIC BLOOD PRESSURE: 70 MMHG | WEIGHT: 220 LBS | TEMPERATURE: 98 F

## 2019-02-22 DIAGNOSIS — L30.9 ECZEMA, UNSPECIFIED TYPE: ICD-10-CM

## 2019-02-22 DIAGNOSIS — N18.30 CHRONIC KIDNEY DISEASE, STAGE III (MODERATE) (HCC): ICD-10-CM

## 2019-02-22 DIAGNOSIS — E13.40 OTHER SPECIFIED DIABETES MELLITUS WITH DIABETIC NEUROPATHY, UNSPECIFIED WHETHER LONG TERM INSULIN USE (HCC): Primary | ICD-10-CM

## 2019-02-22 DIAGNOSIS — I10 ESSENTIAL HYPERTENSION: ICD-10-CM

## 2019-02-22 DIAGNOSIS — M15.9 PRIMARY OSTEOARTHRITIS INVOLVING MULTIPLE JOINTS: ICD-10-CM

## 2019-02-22 DIAGNOSIS — I73.9 PERIPHERAL ARTERIAL DISEASE (HCC): ICD-10-CM

## 2019-02-22 DIAGNOSIS — E78.2 MIXED HYPERLIPIDEMIA: ICD-10-CM

## 2019-02-22 DIAGNOSIS — G47.33 OSA (OBSTRUCTIVE SLEEP APNEA): ICD-10-CM

## 2019-02-22 LAB — SL AMB POCT HEMOGLOBIN AIC: 6.5 (ref ?–6.5)

## 2019-02-22 PROCEDURE — 99214 OFFICE O/P EST MOD 30 MIN: CPT | Performed by: FAMILY MEDICINE

## 2019-02-22 PROCEDURE — 83036 HEMOGLOBIN GLYCOSYLATED A1C: CPT | Performed by: FAMILY MEDICINE

## 2019-02-22 RX ORDER — PREDNISONE 10 MG/1
10 TABLET ORAL
COMMUNITY
Start: 2019-02-19 | End: 2019-03-20

## 2019-02-22 NOTE — PROGRESS NOTES
Assessment/Plan:  Patient to continue present treatment  Patient instructed to follow a low-fat, low-salt and a low sugar/carbohydrate diet and get regular aerobic exercise 150 minutes per week  Continue home glucose monitoring  Follow up with specialists as scheduled and return to the office in 3 months  Patient encouraged to schedule screening colonoscopy with [facility] as ordered  Diabetes mellitus with neuropathy ([facility] )  Lab Results   Component Value Date    HGBA1C 6 5 02/22/2019    Good control with fingerstick hemoglobin A1c at 6 5  Continue present treatment and continue home glucose monitoring  Follow up with Endocrinology as scheduled  No results for input(s): POCGLU in the last 72 hours  Blood Sugar Average: Last 72 hrs:         Diagnoses and all orders for this visit:    Other specified diabetes mellitus with diabetic neuropathy, unspecified whether long term insulin use ([facility] )  -     POCT hemoglobin A1c    Essential hypertension    Mixed hyperlipidemia    NICOLASA (obstructive sleep apnea)    Peripheral arterial disease (HCC)    Primary osteoarthritis involving multiple joints    Chronic kidney disease, stage III (moderate) (HCC)    Eczema, unspecified type    Other orders  -     predniSONE 10 mg tablet; 10 mg           Subjective:      Patient ID: Julius Schmitz is a 70 y o  male  Patient is here for routine appointment for chronic conditions and he is not fasting today  Patient has labs ordered from other specialists  Patient has been feeling well overall and has been exercising 2 to 3 times a week for 45 minutes at physical therapy  Home glucose monitoring reveals fasting blood sugars 100-120 and 2 hour post prandials 140-150  Patient is up-to-date on diabetic eye exam and foot exam   Patient has not scheduled screening colonoscopy yet and again encouraged to do so  Diabetes   He presents for his follow-up diabetic visit  He has type 2 diabetes mellitus   His disease course has been improving  Hypoglycemia symptoms include nervousness/anxiousness and sweats  Pertinent negatives for hypoglycemia include no headaches  Associated symptoms include foot paresthesias  Pertinent negatives for diabetes include no blurred vision, no chest pain, no fatigue, no foot ulcerations, no polydipsia, no polyuria, no visual change, no weakness and no weight loss  There are no hypoglycemic complications  Symptoms are improving  Diabetic complications include nephropathy and peripheral neuropathy  Pertinent negatives for diabetic complications include no CVA, heart disease or retinopathy  Risk factors for coronary artery disease include dyslipidemia, diabetes mellitus, hypertension, male sex, obesity and tobacco exposure  Current diabetic treatment includes oral agent (dual therapy) and insulin injections  He is compliant with treatment all of the time  His weight is stable  He is following a generally healthy diet  He participates in exercise three times a week  There is no change in his home blood glucose trend  His breakfast blood glucose is taken between 6-7 am  His breakfast blood glucose range is generally 110-130 mg/dl  His dinner blood glucose is taken between 5-6 pm  His dinner blood glucose range is generally 140-180 mg/dl  An ACE inhibitor/angiotensin II receptor blocker is being taken  He sees a podiatrist Eye exam is current  Hypertension   This is a chronic problem  The problem is controlled  Associated symptoms include peripheral edema and sweats  Pertinent negatives include no anxiety, blurred vision, chest pain, headaches, orthopnea, palpitations, PND or shortness of breath  Past treatments include angiotensin blockers, beta blockers, calcium channel blockers and diuretics  The current treatment provides significant improvement  There are no compliance problems  There is no history of CVA or retinopathy         The following portions of the patient's history were reviewed and updated as appropriate: allergies, current medications, past family history, past medical history, past social history, past surgical history and problem list     Review of Systems   Constitutional: Negative for fatigue and weight loss  Eyes: Negative for blurred vision  Respiratory: Negative for shortness of breath  Cardiovascular: Negative for chest pain, palpitations, orthopnea and PND  Gastrointestinal: Positive for diarrhea  Negative for abdominal pain, blood in stool, constipation, nausea and vomiting  Endocrine: Negative for polydipsia and polyuria  Genitourinary: Negative for difficulty urinating, dysuria, frequency, hematuria and urgency  Neurological: Negative for weakness and headaches  Psychiatric/Behavioral: The patient is nervous/anxious  Objective:      /70   Pulse 68   Temp 98 °F (36 7 °C)   Resp 16   Ht 5' 10 87" (1 8 m)   Wt 99 8 kg (220 lb)   BMI 30 80 kg/m²          Physical Exam   Constitutional: He is oriented to person, place, and time  He appears well-developed and well-nourished  No distress  HENT:   Head: Normocephalic  Right Ear: External ear normal    Left Ear: External ear normal    Nose: Nose normal    Mouth/Throat: Oropharynx is clear and moist    Eyes: Conjunctivae are normal  No scleral icterus  Neck: Neck supple  Cardiovascular: Normal rate and regular rhythm  Pulmonary/Chest: Effort normal and breath sounds normal    Abdominal: Soft  There is no tenderness  Musculoskeletal: He exhibits edema  Mild bilateral pretibial edema  Lymphadenopathy:     He has no cervical adenopathy  Neurological: He is alert and oriented to person, place, and time  Skin: Skin is warm and dry  Psychiatric: He has a normal mood and affect

## 2019-02-22 NOTE — ASSESSMENT & PLAN NOTE
Lab Results   Component Value Date    HGBA1C 6 5 02/22/2019    Good control with fingerstick hemoglobin A1c at 6 5  Continue present treatment and continue home glucose monitoring  Follow up with Endocrinology as scheduled  No results for input(s): POCGLU in the last 72 hours      Blood Sugar Average: Last 72 hrs:

## 2019-02-23 DIAGNOSIS — I48.0 PAROXYSMAL ATRIAL FIBRILLATION (HCC): ICD-10-CM

## 2019-02-28 ENCOUNTER — TELEPHONE (OUTPATIENT)
Dept: FAMILY MEDICINE CLINIC | Facility: CLINIC | Age: 64
End: 2019-02-28

## 2019-02-28 ENCOUNTER — IN-CLINIC DEVICE VISIT (OUTPATIENT)
Dept: CARDIOLOGY CLINIC | Facility: CLINIC | Age: 64
End: 2019-02-28
Payer: MEDICARE

## 2019-02-28 DIAGNOSIS — Z95.0 CARDIAC PACEMAKER IN SITU: ICD-10-CM

## 2019-02-28 DIAGNOSIS — I49.5 SICK SINUS SYNDROME (HCC): Primary | ICD-10-CM

## 2019-02-28 DIAGNOSIS — Z45.018 ADJUSTMENT AND MANAGEMENT OF CARDIAC PACEMAKER: ICD-10-CM

## 2019-02-28 PROCEDURE — 93280 PM DEVICE PROGR EVAL DUAL: CPT | Performed by: INTERNAL MEDICINE

## 2019-02-28 NOTE — PROGRESS NOTES
Results for orders placed or performed in visit on 02/28/19   Cardiac EP device report    Narrative    MDT DUAL PM  DEVICE INTERROGATED IN THE [facility] OFFICE  BATTERY VOLTAGE ADEQUATE (15 4 YRS)  AP-15%, >99% (DEPENDENT/[facility])  ALL LEAD PARAMETERS WITHIN NORMAL LIMITS  NO SIGNIFICANT HIGH RATE EPISODES  DECREASE MADE TO RA & RV AMPLITUDE TO PROMOTE DEVICE LONGEVITY WHILE MAINTAINING AN APPROPRIATE SAFETY MARGIN  NORMAL DEVICE FUNCTION   GV

## 2019-02-28 NOTE — TELEPHONE ENCOUNTER
Call placed to patient regarding MRI overdue results  Per patient he is no longer intrested in MRI  Order will be canceled

## 2019-03-05 ENCOUNTER — HOSPITAL ENCOUNTER (EMERGENCY)
Facility: HOSPITAL | Age: 64
Discharge: HOME | End: 2019-03-05
Attending: EMERGENCY MEDICINE | Admitting: EMERGENCY MEDICINE
Payer: MEDICARE

## 2019-03-05 ENCOUNTER — APPOINTMENT (EMERGENCY)
Dept: RADIOLOGY | Facility: HOSPITAL | Age: 64
End: 2019-03-05
Payer: MEDICARE

## 2019-03-05 VITALS
RESPIRATION RATE: 16 BRPM | TEMPERATURE: 98.7 F | HEART RATE: 82 BPM | OXYGEN SATURATION: 95 % | SYSTOLIC BLOOD PRESSURE: 157 MMHG | BODY MASS INDEX: 30.86 KG/M2 | DIASTOLIC BLOOD PRESSURE: 67 MMHG | WEIGHT: 220.4 LBS

## 2019-03-05 DIAGNOSIS — R42 LIGHTHEADEDNESS: Primary | ICD-10-CM

## 2019-03-05 DIAGNOSIS — E16.2 HYPOGLYCEMIA: ICD-10-CM

## 2019-03-05 LAB
ALBUMIN SERPL BCP-MCNC: 2.4 G/DL (ref 3.5–5)
ALP SERPL-CCNC: 96 U/L (ref 46–116)
ALT SERPL W P-5'-P-CCNC: 30 U/L (ref 12–78)
ANION GAP SERPL CALCULATED.3IONS-SCNC: 12 MMOL/L (ref 4–13)
AST SERPL W P-5'-P-CCNC: 32 U/L (ref 5–45)
BASOPHILS # BLD AUTO: 0.1 THOUSANDS/ΜL (ref 0–0.1)
BASOPHILS NFR BLD AUTO: 1 % (ref 0–1)
BILIRUB DIRECT SERPL-MCNC: 0.18 MG/DL (ref 0–0.2)
BILIRUB SERPL-MCNC: 0.57 MG/DL (ref 0.2–1)
BUN SERPL-MCNC: 33 MG/DL (ref 5–25)
CALCIUM SERPL-MCNC: 9.5 MG/DL (ref 8.3–10.1)
CHLORIDE SERPL-SCNC: 100 MMOL/L (ref 100–108)
CO2 SERPL-SCNC: 22 MMOL/L (ref 21–32)
CREAT SERPL-MCNC: 1.38 MG/DL (ref 0.6–1.3)
EOSINOPHIL # BLD AUTO: 0.54 THOUSAND/ΜL (ref 0–0.61)
EOSINOPHIL NFR BLD AUTO: 4 % (ref 0–6)
ERYTHROCYTE [DISTWIDTH] IN BLOOD BY AUTOMATED COUNT: 14.9 % (ref 11.6–15.1)
GFR SERPL CREATININE-BSD FRML MDRD: 53 ML/MIN/1.73SQ M
GLUCOSE SERPL-MCNC: 101 MG/DL (ref 65–140)
GLUCOSE SERPL-MCNC: 128 MG/DL (ref 65–140)
GLUCOSE SERPL-MCNC: 43 MG/DL (ref 65–140)
GLUCOSE SERPL-MCNC: 48 MG/DL (ref 65–140)
GLUCOSE SERPL-MCNC: 55 MG/DL (ref 65–140)
HCT VFR BLD AUTO: 33.3 % (ref 36.5–49.3)
HGB BLD-MCNC: 11 G/DL (ref 12–17)
IMM GRANULOCYTES # BLD AUTO: 0.12 THOUSAND/UL (ref 0–0.2)
IMM GRANULOCYTES NFR BLD AUTO: 1 % (ref 0–2)
LYMPHOCYTES # BLD AUTO: 1.46 THOUSANDS/ΜL (ref 0.6–4.47)
LYMPHOCYTES NFR BLD AUTO: 10 % (ref 14–44)
MAGNESIUM SERPL-MCNC: 2.1 MG/DL (ref 1.6–2.6)
MCH RBC QN AUTO: 29.3 PG (ref 26.8–34.3)
MCHC RBC AUTO-ENTMCNC: 33 G/DL (ref 31.4–37.4)
MCV RBC AUTO: 89 FL (ref 82–98)
MONOCYTES # BLD AUTO: 1.41 THOUSAND/ΜL (ref 0.17–1.22)
MONOCYTES NFR BLD AUTO: 9 % (ref 4–12)
NEUTROPHILS # BLD AUTO: 11.57 THOUSANDS/ΜL (ref 1.85–7.62)
NEUTS SEG NFR BLD AUTO: 75 % (ref 43–75)
NRBC BLD AUTO-RTO: 0 /100 WBCS
NT-PROBNP SERPL-MCNC: 694 PG/ML
PLATELET # BLD AUTO: 388 THOUSANDS/UL (ref 149–390)
PMV BLD AUTO: 8.7 FL (ref 8.9–12.7)
POTASSIUM SERPL-SCNC: 4.5 MMOL/L (ref 3.5–5.3)
PROT SERPL-MCNC: 7.6 G/DL (ref 6.4–8.2)
RBC # BLD AUTO: 3.75 MILLION/UL (ref 3.88–5.62)
SODIUM SERPL-SCNC: 134 MMOL/L (ref 136–145)
TROPONIN I SERPL-MCNC: <0.02 NG/ML
WBC # BLD AUTO: 15.2 THOUSAND/UL (ref 4.31–10.16)

## 2019-03-05 PROCEDURE — 80076 HEPATIC FUNCTION PANEL: CPT | Performed by: EMERGENCY MEDICINE

## 2019-03-05 PROCEDURE — 80048 BASIC METABOLIC PNL TOTAL CA: CPT | Performed by: EMERGENCY MEDICINE

## 2019-03-05 PROCEDURE — 84484 ASSAY OF TROPONIN QUANT: CPT | Performed by: EMERGENCY MEDICINE

## 2019-03-05 PROCEDURE — 83735 ASSAY OF MAGNESIUM: CPT | Performed by: EMERGENCY MEDICINE

## 2019-03-05 PROCEDURE — 36415 COLL VENOUS BLD VENIPUNCTURE: CPT | Performed by: EMERGENCY MEDICINE

## 2019-03-05 PROCEDURE — 85025 COMPLETE CBC W/AUTO DIFF WBC: CPT | Performed by: EMERGENCY MEDICINE

## 2019-03-05 PROCEDURE — 93005 ELECTROCARDIOGRAM TRACING: CPT

## 2019-03-05 PROCEDURE — 82948 REAGENT STRIP/BLOOD GLUCOSE: CPT

## 2019-03-05 PROCEDURE — 99284 EMERGENCY DEPT VISIT MOD MDM: CPT

## 2019-03-05 PROCEDURE — 83880 ASSAY OF NATRIURETIC PEPTIDE: CPT | Performed by: EMERGENCY MEDICINE

## 2019-03-05 PROCEDURE — 71046 X-RAY EXAM CHEST 2 VIEWS: CPT

## 2019-03-05 NOTE — ED PROVIDER NOTES
History  Chief Complaint   Patient presents with    Dizziness     patient c/o of feeling light headed that started a couple of days ago; lisa denies SOB or CP     69 YO male presents with intermittent episodes of lightheadedness  States this has been occurring for at least the last week, possibly more than this  Feels like he may pass out  Wife states that, occasionally he will have some chills with this  Pt feels these episode may correlate with his blood sugar as he has checked it and found it to be low when he is having lightheadedness  He has started drinking soda when he finds this to be low  Wife states he has been drinking soda pretty regularly over the last week  Pt denies chest pain or shortness of breath, no palpitations  Wife states concern as pt had a recent admission for CHF, required ICU and placement of a pacemaker  Pt states since this time he has been trying to manage his sodium and carbohydrates better, notes his last Hgb A1C was 6 5  Pt states waking in the night and finding his sugars to be in the 40's  Pt denies CP/SOB/F/C/N/V/D/C, no dysuria, burning on urination or blood in urine  History provided by:  Patient and spouse  Dizziness   Quality:  Lightheadedness  Severity:  Moderate  Onset quality:  Gradual  Duration:  1 week  Timing:  Intermittent  Progression:  Waxing and waning  Chronicity:  New  Relieved by: Sugar  Worsened by:  Nothing  Associated symptoms: no chest pain, no headaches, no nausea, no palpitations, no shortness of breath, no vision changes, no vomiting and no weakness        Prior to Admission Medications   Prescriptions Last Dose Informant Patient Reported? Taking?    Cholecalciferol (VITAMIN D-3) 1000 units CAPS  Self Yes Yes   Sig: Take 1 capsule by mouth every morning     Insulin Degludec-Liraglutide (XULTOPHY) 100 units-3 6 mg/mL injection pen  Self No Yes   Sig: Inject 30 Units under the skin daily for 90 days   Multiple Vitamin (MULTIVITAMIN) tablet  Self Yes Yes   Sig: Take 1 tablet by mouth daily   allopurinol (ZYLOPRIM) 300 mg tablet   No Yes   Sig: TAKE 1 TABLET (300 MG TOTAL) BY MOUTH EVERY MORNING   amLODIPine (NORVASC) 10 mg tablet   No Yes   Sig: Take 1 tablet (10 mg total) by mouth daily   apixaban (ELIQUIS) 5 mg   No Yes   Sig: Take 1 tablet (5 mg total) by mouth every 12 (twelve) hours   betamethasone valerate (VALISONE) 0 1 % cream Not Taking at Unknown time Self Yes No   Sig: APPLY TO BACK,ARMS,CHEST,LEGS AND ABDOMEN TWICE A DAY   furosemide (LASIX) 40 mg tablet   No Yes   Sig: Take 1 tablet (40 mg total) by mouth daily   glimepiride (AMARYL) 4 mg tablet  Self No Yes   Sig: TAKE 1 TABLET BY MOUTH TWICE A DAY   glucose blood (ONE TOUCH ULTRA TEST) test strip  Self No Yes   Si each by Other route 3 (three) times a day   hydrOXYzine HCL (ATARAX) 10 mg tablet  Self Yes Yes   Sig: Take 10 mg by mouth every 6 (six) hours as needed     losartan (COZAAR) 50 mg tablet   No Yes   Sig: Take 1 5 tablets (75 mg total) by mouth daily   metFORMIN (GLUCOPHAGE) 500 mg tablet  Self No Yes   Sig: Take 2 tablets (1,000 mg total) by mouth 2 (two) times a day with meals [id number] /twice a day   metoprolol tartrate (LOPRESSOR) 50 mg tablet   No Yes   Sig: Take 1 tablet (50 mg total) by mouth every 12 (twelve) hours   omega-3-acid ethyl esters (LOVAZA) 1 g capsule   No Yes   Sig: Take 2 capsules (2 g total) by mouth 2 (two) times a day   predniSONE 10 mg tablet Not Taking at Unknown time  Yes No   Sig: 10 mg    simvastatin (ZOCOR) 20 mg tablet   No Yes   Sig: Take 1 tablet (20 mg total) by mouth daily at bedtime   tamsulosin (FLOMAX) 0 4 mg   No Yes   Sig: Take 1 capsule (0 4 mg total) by mouth daily with dinner      Facility-Administered Medications: None       Past Medical History:   Diagnosis Date    Arthritis     OA    Bruise of both arms     forearms and both hands    Bruises easily     CHF (congestive heart failure) (HCC)     Diabetes mellitus ([facility] )     Diabetic foot ulcer ([address] )     Eczema     Erectile dysfunction     Gout     Hyperlipidemia     Hypertension     Murmur     Nephropathy     Osteoarthritis     [facility]peripheral artery disease) ([facility])     Seasonal allergies     Toe infection     bilat great toes    Vertigo     Walks frequently     Wears dentures     upper    Wears glasses        Past Surgical History:   Procedure Laterality Date    CARDIAC PACEMAKER PLACEMENT      CARPAL TUNNEL RELEASE Left     CARPAL TUNNEL RELEASE Right     CARPAL TUNNEL RELEASE      KNEE ARTHROSCOPY Left     KNEE ARTHROSCOPY Right     KNEE SURGERY      PA AMPUTATION TOE,I-P JT Bilateral 5/2/2017    Procedure: PARTIAL AMPUTATION RIGHT AND LEFT HALLUX ;  Surgeon: Charu Mercer DPM;  Location: [facility];  Service: Podiatry    PA AMPUTATION TOE,MT-P JT Left 7/25/2017    Procedure: 2ND TOE AMPUTATION;  Surgeon: Charu Mercer DPM;  Location: [facility];  Service: Podiatry    SHOULDER ARTHROSCOPY Left     with screws,RTC    SHOULDER SURGERY      VASECTOMY         Family History   Problem Relation Age of Onset    Heart disease Father     No Known Problems Mother     Hypertension Father     Pulmonary embolism Father      I have reviewed and agree with the history as documented  Social History     Tobacco Use    Smoking status: Former Smoker     Packs/day: 1 00     Years: 30 00     Pack years: 30 00     Types: Cigarettes    Smokeless tobacco: Never Used    Tobacco comment: quit 10 years ago   Substance Use Topics    Alcohol use: No    Drug use: No        Review of Systems   Constitutional: Negative for fever  HENT: Negative for dental problem  Eyes: Negative for visual disturbance  Respiratory: Negative for shortness of breath  Cardiovascular: Negative for chest pain and palpitations  Gastrointestinal: Negative for abdominal pain, nausea and vomiting  Genitourinary: Negative for dysuria and frequency     Musculoskeletal: Negative for neck pain and neck stiffness  Skin: Negative for rash  Neurological: Positive for dizziness  Negative for weakness, light-headedness and headaches  Psychiatric/Behavioral: Negative for agitation, behavioral problems and confusion  All other systems reviewed and are negative  Physical Exam  Physical Exam   Constitutional: He is oriented to person, place, and time  He appears well-developed and well-nourished  HENT:   Head: Normocephalic and atraumatic  Eyes: EOM are normal    Neck: Normal range of motion  Cardiovascular: Normal rate, regular rhythm and normal heart sounds  Pulmonary/Chest: Effort normal and breath sounds normal    Abdominal: Soft  Musculoskeletal: Normal range of motion  Neurological: He is alert and oriented to person, place, and time  Skin: Skin is warm and dry  Psychiatric: He has a normal mood and affect  His behavior is normal    Nursing note and vitals reviewed        Vital Signs  ED Triage Vitals   Temperature Pulse Respirations Blood Pressure SpO2   03/05/19 1720 03/05/19 1721 03/05/19 1720 03/05/19 1721 03/05/19 1721   98 7 °F (37 1 °C) 91 18 160/70 95 %      Temp Source Heart Rate Source Patient Position - Orthostatic VS BP Location FiO2 (%)   03/05/19 1720 03/05/19 1720 03/05/19 1721 03/05/19 1721 --   Oral Monitor Sitting Right arm       Pain Score       03/05/19 1720       No Pain           Vitals:    03/05/19 1721 03/05/19 1847 03/05/19 2030   BP: 160/70 150/66 157/67   Pulse: 91 85 82   Patient Position - Orthostatic VS: Sitting Lying        Visual Acuity  Visual Acuity      Most Recent Value   L Pupil Size (mm)  2   R Pupil Size (mm)  2          ED Medications  Medications - No data to display    Diagnostic Studies  Results Reviewed     Procedure Component Value Units Date/Time    Fingerstick Glucose (POCT) [[id number]]  (Normal) Collected:  03/05/19 2025    Lab Status:  Final result Updated:  03/05/19 2354     POC Glucose 128 mg/dl     B-type natriuretic peptide [[id number]] (Abnormal) Collected:  03/05/19 1754    Lab Status:  Final result Specimen:  Blood from Arm, Right Updated:  03/05/19 2036     NT-proBNP 694 pg/mL     Fingerstick Glucose (POCT) [[id number]]  (Normal) Collected:  03/05/19 1846    Lab Status:  Final result Updated:  03/05/19 1853     POC Glucose 101 mg/dl     Troponin I [[id number]]  (Normal) Collected:  03/05/19 1754    Lab Status:  Final result Specimen:  Blood from Arm, Right Updated:  03/05/19 1850     Troponin I <0 02 ng/mL     Basic metabolic panel [[id number]]  (Abnormal) Collected:  03/05/19 1754    Lab Status:  Final result Specimen:  Blood from Arm, Right Updated:  03/05/19 1843     Sodium 134 mmol/L      Potassium 4 5 mmol/L      Chloride 100 mmol/L      CO2 22 mmol/L      ANION GAP 12 mmol/L      BUN 33 mg/dL      Creatinine 1 38 mg/dL      Glucose 48 mg/dL      Calcium 9 5 mg/dL      eGFR 53 ml/min/1 73sq m     Narrative:       National Kidney Disease Education Program recommendations are as follows:  GFR calculation is accurate only with a steady state creatinine  Chronic Kidney disease less than 60 ml/min/1 73 sq  meters  Kidney failure less than 15 ml/min/1 73 sq  meters      Hepatic function panel [[id number]]  (Abnormal) Collected:  03/05/19 1754    Lab Status:  Final result Specimen:  Blood from Arm, Right Updated:  03/05/19 1843     Total Bilirubin 0 57 mg/dL      Bilirubin, Direct 0 18 mg/dL      Alkaline Phosphatase 96 U/L      AST 32 U/L      ALT 30 U/L      Total Protein 7 6 g/dL      Albumin 2 4 g/dL     Magnesium [[id number]]  (Normal) Collected:  03/05/19 1754    Lab Status:  Final result Specimen:  Blood from Arm, Right Updated:  03/05/19 1843     Magnesium 2 1 mg/dL     CBC and differential [[id number]]  (Abnormal) Collected:  03/05/19 1754    Lab Status:  Final result Specimen:  Blood from Arm, Right Updated:  03/05/19 1815     WBC 15 20 Thousand/uL      RBC 3 75 Million/uL      Hemoglobin 11 0 g/dL      Hematocrit 33 3 %      MCV 89 fL MCH 29 3 pg      MCHC 33 0 g/dL      RDW 14 9 %      MPV 8 7 fL      Platelets 479 Thousands/uL      nRBC 0 /100 WBCs      Neutrophils Relative 75 %      Immat GRANS % 1 %      Lymphocytes Relative 10 %      Monocytes Relative 9 %      Eosinophils Relative 4 %      Basophils Relative 1 %      Neutrophils Absolute 11 57 Thousands/µL      Immature Grans Absolute 0 12 Thousand/uL      Lymphocytes Absolute 1 46 Thousands/µL      Monocytes Absolute 1 41 Thousand/µL      Eosinophils Absolute 0 54 Thousand/µL      Basophils Absolute 0 10 Thousands/µL     Fingerstick Glucose (POCT) [[id number]]  (Abnormal) Collected:  03/05/19 1808    Lab Status:  Final result Updated:  03/05/19 1812     POC Glucose 55 mg/dl     Fingerstick Glucose (POCT) [[id number]]  (Abnormal) Collected:  03/05/19 1740    Lab Status:  Final result Updated:  03/05/19 1755     POC Glucose 43 mg/dl                  XR chest 2 views   Final Result by Drake Murphy MD (03/05 1915)      No acute cardiopulmonary disease  Workstation performed: [id number]                    Procedures  ECG 12 Lead Documentation  Date/Time: 3/6/2019 11:14 AM  Performed by: Nanette Lund MD  Authorized by: Nanette Lund MD     ECG reviewed by me, the ED Provider: yes    Patient location:  ED  Interpretation:     Interpretation: normal    Rate:     ECG rate:  83    ECG rate assessment: normal    Rhythm:     Rhythm: paced    QRS:     QRS axis:  Normal    QRS intervals:  Normal  Conduction:     Conduction: normal    ST segments:     ST segments:  Normal  T waves:     T waves: normal             Phone Contacts  ED Phone Contact    ED Course  ED Course as of Mar 06 1116 Tue Mar 05, 2019   2025 Spoke with on call endocrinologist, went over Pt's current diabetes regimen  Endocrine wants Pt to stop taking evening Glimiperide        [phone] previously   NT-proBNP(!): [phone] Urged Pt and wife to return should he continue to have lightheadedness, have given new diabetes regimen provided by endocrinology  Pt verbalized understanding of medication changes, reasons to return  MDM  Number of Diagnoses or Management Options  Hypoglycemia: new and requires workup  Lightheadedness: new and requires workup  Diagnosis management comments: 1  Lightheadedness - Pt states lightheadedness seems to be at times when sugars are low  Pt lightheaded on arrival and found to have a glucose of 43; these symptoms did improve with carbohydrate load  Will check ECG, troponin, CXR and BNP to assess CHF, metabolic panel for dehydration and electrolyte abnormalities, urine for infection  Will likely discuss with endocrinology  Amount and/or Complexity of Data Reviewed  Clinical lab tests: ordered and reviewed  Tests in the radiology section of CPT®: ordered and reviewed  Obtain history from someone other than the patient: yes  Review and summarize past medical records: yes  Discuss the patient with other providers: yes  Independent visualization of images, tracings, or specimens: yes    Patient Progress  Patient progress: improved      Disposition  Final diagnoses:   Lightheadedness   Hypoglycemia     Time reflects when diagnosis was documented in both MDM as applicable and the Disposition within this note     Time User Action Codes Description Comment    3/5/2019  8:38 PM Nichole VERGARA Add [R42] [facility]     3/5/2019  8:38 PM Nichole VERGARA Add [E16 2] Hypoglycemia       ED Disposition     ED Disposition Condition Date/Time Comment    Discharge Stable Tu Mar 5, 2019  8:38 PM Katerin Horne discharge to home/self care              Follow-up Information     Follow up With Specialties Details Why [facility] MD [facility] Endocrinology Schedule an appointment as soon as possible for a visit   [address]  [phone]            Discharge Medication List as of 3/5/2019  8:41 PM      CONTINUE these medications which have NOT CHANGED    Details   allopurinol (ZYLOPRIM) 300 mg tablet TAKE 1 TABLET (300 MG TOTAL) BY MOUTH EVERY MORNING, Starting Sat 2/9/2019, Normal      amLODIPine (NORVASC) 10 mg tablet Take 1 tablet (10 mg total) by mouth daily, Starting Wed 12/5/2018, Normal      apixaban (ELIQUIS) 5 mg Take 1 tablet (5 mg total) by mouth every 12 (twelve) hours, Starting Sat 2/23/2019, Normal      betamethasone valerate (VALISONE) 0 1 % cream APPLY TO BACK,ARMS,CHEST,LEGS AND ABDOMEN TWICE A DAY, Historical Med      Cholecalciferol (VITAMIN D-3) 1000 units CAPS Take 1 capsule by mouth every morning  , Historical Med      furosemide (LASIX) 40 mg tablet Take 1 tablet (40 mg total) by mouth daily, Starting Fri 1/11/2019, No Print      glimepiride (AMARYL) 4 mg tablet TAKE 1 TABLET BY MOUTH TWICE A DAY, Normal      glucose blood (ONE TOUCH ULTRA TEST) test strip 1 each by Other route 3 (three) times a day, Starting Fri 11/23/2018, Normal      hydrOXYzine HCL (ATARAX) 10 mg tablet Take 10 mg by mouth every 6 (six) hours as needed  , Starting Wed 10/24/2018, Historical Med      Insulin Degludec-Liraglutide (XULTOPHY) 100 units-3 6 mg/mL injection pen Inject 30 Units under the skin daily for 90 days, Starting Thu 7/26/2018, Until Tue 3/5/2019, Normal      losartan (COZAAR) 50 mg tablet Take 1 5 tablets (75 mg total) by mouth daily, Starting Mon 12/10/2018, Normal      metFORMIN (GLUCOPHAGE) 500 mg tablet Take 2 tablets (1,000 mg total) by mouth 2 (two) times a day with meals [id number] /twice a day, Starting Tue 7/17/2018, Normal      metoprolol tartrate (LOPRESSOR) 50 mg tablet Take 1 tablet (50 mg total) by mouth every 12 (twelve) hours, Starting Tue 11/27/2018, Normal      Multiple Vitamin (MULTIVITAMIN) tablet Take 1 tablet by mouth daily, Historical Med      omega-3-acid ethyl esters (LOVAZA) 1 g capsule Take 2 capsules (2 g total) by mouth 2 (two) times a day, Starting Wed 12/5/2018, Print      predniSONE 10 mg tablet 10 mg , Starting Tue 2/19/2019, Historical Med      simvastatin (ZOCOR) 20 mg tablet Take 1 tablet (20 mg total) by mouth daily at bedtime, Starting Tue 11/27/2018, Normal      tamsulosin (FLOMAX) 0 4 mg Take 1 capsule (0 4 mg total) by mouth daily with dinner, Starting Wed 12/19/2018, Normal           No discharge procedures on file      ED Provider  Electronically Signed by           Candance Blow, MD  03/06/19 7094

## 2019-03-05 NOTE — ED NOTES
Patients POCT blood glucose 43, patient provided with orange juice at this time        Mj Del Rosario  03/05/19 6616

## 2019-03-06 LAB
ATRIAL RATE: 83 BPM
P AXIS: 83 DEGREES
PR INTERVAL: 172 MS
QRS AXIS: -50 DEGREES
QRSD INTERVAL: 182 MS
QT INTERVAL: 426 MS
QTC INTERVAL: 500 MS
T WAVE AXIS: 103 DEGREES
VENTRICULAR RATE: 83 BPM

## 2019-03-06 PROCEDURE — 93010 ELECTROCARDIOGRAM REPORT: CPT | Performed by: INTERNAL MEDICINE

## 2019-03-06 NOTE — DISCHARGE INSTRUCTIONS
Stop taking your evening dose of Glimepiride, only take the morning dose  Take 20 units of the Xultophy instead of 30 units  Continue taking 1000mg of Metformin twice daily  Return to the ER if you have continued lightheadedness, or if you develop shortness of breath or chest pain  Call your endocrinologist in the morning, let them know you have been having difficulty controlling your sugars  You should be see in the office as soon as you can get an appointment

## 2019-03-07 ENCOUNTER — LAB (OUTPATIENT)
Dept: LAB | Facility: CLINIC | Age: 64
End: 2019-03-07
Payer: MEDICARE

## 2019-03-07 DIAGNOSIS — E11.22 TYPE 2 DIABETES MELLITUS WITH STAGE 3 CHRONIC KIDNEY DISEASE, WITHOUT LONG-TERM CURRENT USE OF INSULIN (HCC): ICD-10-CM

## 2019-03-07 DIAGNOSIS — N18.30 TYPE 2 DIABETES MELLITUS WITH STAGE 3 CHRONIC KIDNEY DISEASE, WITHOUT LONG-TERM CURRENT USE OF INSULIN (HCC): ICD-10-CM

## 2019-03-07 LAB
ALBUMIN SERPL BCP-MCNC: 2.3 G/DL (ref 3.5–5)
ALP SERPL-CCNC: 100 U/L (ref 46–116)
ALT SERPL W P-5'-P-CCNC: 25 U/L (ref 12–78)
ANION GAP SERPL CALCULATED.3IONS-SCNC: 8 MMOL/L (ref 4–13)
AST SERPL W P-5'-P-CCNC: 23 U/L (ref 5–45)
BILIRUB SERPL-MCNC: 0.62 MG/DL (ref 0.2–1)
BUN SERPL-MCNC: 37 MG/DL (ref 5–25)
CALCIUM SERPL-MCNC: 9 MG/DL (ref 8.3–10.1)
CHLORIDE SERPL-SCNC: 104 MMOL/L (ref 100–108)
CHOLEST SERPL-MCNC: 122 MG/DL (ref 50–200)
CO2 SERPL-SCNC: 22 MMOL/L (ref 21–32)
CREAT SERPL-MCNC: 1.33 MG/DL (ref 0.6–1.3)
GFR SERPL CREATININE-BSD FRML MDRD: 56 ML/MIN/1.73SQ M
GLUCOSE P FAST SERPL-MCNC: 168 MG/DL (ref 65–99)
HDLC SERPL-MCNC: 33 MG/DL (ref 40–60)
LDLC SERPL CALC-MCNC: 67 MG/DL (ref 0–100)
NONHDLC SERPL-MCNC: 89 MG/DL
POTASSIUM SERPL-SCNC: 5.1 MMOL/L (ref 3.5–5.3)
PROT SERPL-MCNC: 7.2 G/DL (ref 6.4–8.2)
SODIUM SERPL-SCNC: 134 MMOL/L (ref 136–145)
T4 FREE SERPL-MCNC: 1.22 NG/DL (ref 0.76–1.46)
TRIGL SERPL-MCNC: 111 MG/DL
TSH SERPL DL<=0.05 MIU/L-ACNC: 2.14 UIU/ML (ref 0.36–3.74)

## 2019-03-07 PROCEDURE — 80053 COMPREHEN METABOLIC PANEL: CPT

## 2019-03-07 PROCEDURE — 84443 ASSAY THYROID STIM HORMONE: CPT

## 2019-03-07 PROCEDURE — 80061 LIPID PANEL: CPT

## 2019-03-07 PROCEDURE — 84439 ASSAY OF FREE THYROXINE: CPT

## 2019-03-07 PROCEDURE — 83036 HEMOGLOBIN GLYCOSYLATED A1C: CPT

## 2019-03-07 PROCEDURE — 36415 COLL VENOUS BLD VENIPUNCTURE: CPT

## 2019-03-08 ENCOUNTER — OFFICE VISIT (OUTPATIENT)
Dept: ENDOCRINOLOGY | Facility: CLINIC | Age: 64
End: 2019-03-08
Payer: MEDICARE

## 2019-03-08 VITALS
BODY MASS INDEX: 32.75 KG/M2 | WEIGHT: 228.8 LBS | SYSTOLIC BLOOD PRESSURE: 142 MMHG | HEIGHT: 70 IN | DIASTOLIC BLOOD PRESSURE: 58 MMHG | HEART RATE: 86 BPM

## 2019-03-08 DIAGNOSIS — E11.40 TYPE 2 DIABETES MELLITUS WITH DIABETIC NEUROPATHY, WITHOUT LONG-TERM CURRENT USE OF INSULIN (HCC): Primary | ICD-10-CM

## 2019-03-08 DIAGNOSIS — E78.2 MIXED HYPERLIPIDEMIA: ICD-10-CM

## 2019-03-08 DIAGNOSIS — E16.2 HYPOGLYCEMIA: ICD-10-CM

## 2019-03-08 DIAGNOSIS — I10 ESSENTIAL HYPERTENSION: ICD-10-CM

## 2019-03-08 LAB
EST. AVERAGE GLUCOSE BLD GHB EST-MCNC: 151 MG/DL
HBA1C MFR BLD: 6.9 % (ref 4.2–6.3)

## 2019-03-08 PROCEDURE — 99214 OFFICE O/P EST MOD 30 MIN: CPT | Performed by: NURSE PRACTITIONER

## 2019-03-08 NOTE — PATIENT INSTRUCTIONS
Hypoglycemia in a Person with Diabetes   WHAT YOU NEED TO KNOW:   Hypoglycemia is a serious condition that happens when your blood glucose (sugar) level drops too low  The blood sugar level is usually too high in a person with diabetes, but the level can also drop too low  It is important to follow your diabetes management plan to keep your blood sugar level steady  DISCHARGE INSTRUCTIONS:   Call 911 for any of the following:   · You feel you are going to pass out  · You have a seizure or pass out  · You have trouble thinking clearly  Seek care immediately if:  · Your blood sugar is less than 50 mg/dL and does not respond to treatment  Contact your healthcare provider if:   · You have had symptoms of low blood sugar several times  · You have questions about the amount of insulin or diabetes medicine you are taking  · You have questions or concerns about your condition or care  Medicines:   · Insulin or diabetes medicine  help to keep your blood sugar under control  · Glucagon  may be needed if you have severe hypoglycemia  · Take your medicine as directed  Contact your healthcare provider if you think your medicine is not helping or if you have side effects  Tell him or her if you are allergic to any medicine  Keep a list of the medicines, vitamins, and herbs you take  Include the amounts, and when and why you take them  Bring the list or the pill bottles to follow-up visits  Carry your medicine list with you in case of an emergency  Follow up with your healthcare provider or specialist as directed: You may need dose changes to your insulin or oral diabetes medicine if you have hypoglycemia  Write down your questions so you remember to ask them during your visits  Manage hypoglycemia:   · Check your blood sugar level right away if you have symptoms of hypoglycemia  Hypoglycemia is usually 70 mg/dL or below   Ask your healthcare provider what blood sugar level is too low for you     · If your blood sugar level is too low, eat or drink 15 grams of fast-acting carbohydrate  Examples of this amount of fast-acting carbohydrate are 4 ounces (½ cup) of fruit juice or 4 ounces of regular soda  Other examples are 2 tablespoons of raisins or 3 to 4 glucose tablets  Check your blood sugar level 15 minutes later  If the level is still low (less than 100 mg/dL), have another 15 grams of carbohydrate  When the level returns to 100 mg/dL, eat a snack or meal that contains carbohydrates  This will help prevent another drop in blood sugar  Always carefully follow your healthcare provider's instructions on how to treat low blood sugar levels  · Always carry a source of fast-acting carbohydrate  If you have symptoms of hypoglycemia and you do not have a blood glucose meter, have a source of fast-acting carbohydrate anyway  Avoid carbohydrate foods that are high in fat  The fat content may make it take longer to increase your blood sugar level  Ask your healthcare provider if you should carry a glucagon kit  Glucagon is a medicine that is injected when you develop severe hypoglycemia and become unconscious  Check the expiration date every month and replace it before it expires  · Teach others how to help you if you have symptoms of hypoglycemia  Tell them about the symptoms of hypoglycemia  Ask them to give you a source of fast-acting carbohydrate if you cannot get it yourself  Ask them to give you a glucagon injection if you have symptoms of hypoglycemia and you become unconscious or have a seizure  Ask them to call 911   This is an emergency  Tell them never to try to make you swallow anything if you faint or have a seizure  · Wear medical alert jewelry  or carry a card that says you have diabetes  Ask where to get these items  Prevent hypoglycemia:   · Take diabetes medicine as directed  Take your medicine at the right time and in the right amount   Your healthcare provider may change your blood sugar goals if you get hypoglycemia often  · Eat regular meals and snacks  Talk to your dietitian or healthcare provider about a meal plan that is right for you  Do not skip meals  · Check your blood sugar level as directed  Ask your healthcare provider what your blood sugar levels should be before and after you eat  Ask when and how often to check your blood sugar level  You may need to check at least 3 times each day  Record your blood sugar level results and take the record with you when you see your healthcare provider  Your provider may use the record to make changes to your medicine, food, or exercise schedules  · Check your blood sugar level before you exercise  Exercise can decrease your blood sugar level  If your blood sugar level is less than 100 mg/dL, have a carbohydrate snack  Examples are 4 to 6 crackers, ½ banana, 8 ounces (1 cup) of nonfat or 1% milk, or 4 ounces (½ cup) of juice  If you will exercise for more than 1 hour, you may need to check your blood sugar level every 30 minutes  Your healthcare provider may also recommend that you check your blood sugar level after exercise  · Be aware of how alcohol affects your blood sugar level  Alcohol can cause your blood sugar level to drop for up to 12 hours after drinking  Ask your healthcare provider if alcohol is safe for you  If you drink alcohol, always have a snack or meal at the same time  Women should limit alcohol to 1 drink a day  Men should limit alcohol to 2 drinks a day  A drink of alcohol is 12 ounces of beer, 5 ounces of wine, or 1½ ounces of liquor  © 2017 [address]  Information is for End User's use only and may not be sold, redistributed or otherwise used for commercial purposes  All illustrations and images included in CareNotes® are the copyrighted property of [employer] , [employer]  or Buster Giraldo  The above information is an  only   It is not intended as medical advice for individual conditions or treatments  Talk to your doctor, nurse or pharmacist before following any medical regimen to see if it is safe and effective for you

## 2019-03-11 ENCOUNTER — TELEPHONE (OUTPATIENT)
Dept: ENDOCRINOLOGY | Facility: CLINIC | Age: 64
End: 2019-03-11

## 2019-03-11 NOTE — ASSESSMENT & PLAN NOTE
Patient with recent severe hypoglycemic episode  Xultophy and Glimpride stopped  Patient requesting to keep upcoming appointment with Dr Perla Fuentes on 3/20 to reassess BG off these medications as it has only been three days since ER visit  Plan to keep upcoming appointment  Patient to continue Metformin for now  Discussed with patient Metformin does not increase risk for hypoglycemia and he can resume his normal diet  Stressed for next two weeks to check BG 4x per day and if he is feeling hypoglycemic  Reviewed s/s of hypoglycemia and proper treatment  Provided script for glucagon for emergencies  He will carry meter and glucose tablets with him at all times  He will notify office of BG less than 70

## 2019-03-11 NOTE — TELEPHONE ENCOUNTER
----- Message from Abiel Rossi MD sent at 3/11/2019  8:33 AM EDT -----  Diabetes is under good control  The LDL is at target  Normal thyroid testing      Sent to my chart

## 2019-03-12 LAB
LEFT EYE DIABETIC RETINOPATHY: NORMAL
RIGHT EYE DIABETIC RETINOPATHY: NORMAL

## 2019-03-19 DIAGNOSIS — R33.9 URINARY RETENTION: ICD-10-CM

## 2019-03-19 RX ORDER — TAMSULOSIN HYDROCHLORIDE 0.4 MG/1
0.4 CAPSULE ORAL
Qty: 90 CAPSULE | Refills: 0 | Status: SHIPPED | OUTPATIENT
Start: 2019-03-19 | End: 2019-06-26 | Stop reason: SDUPTHER

## 2019-03-20 ENCOUNTER — OFFICE VISIT (OUTPATIENT)
Dept: ENDOCRINOLOGY | Facility: CLINIC | Age: 64
End: 2019-03-20
Payer: MEDICARE

## 2019-03-20 VITALS
WEIGHT: 221.4 LBS | HEIGHT: 70 IN | SYSTOLIC BLOOD PRESSURE: 140 MMHG | DIASTOLIC BLOOD PRESSURE: 62 MMHG | BODY MASS INDEX: 31.7 KG/M2 | HEART RATE: 68 BPM

## 2019-03-20 DIAGNOSIS — E11.22 TYPE 2 DIABETES MELLITUS WITH STAGE 3 CHRONIC KIDNEY DISEASE, WITHOUT LONG-TERM CURRENT USE OF INSULIN (HCC): Primary | ICD-10-CM

## 2019-03-20 DIAGNOSIS — N18.30 TYPE 2 DIABETES MELLITUS WITH STAGE 3 CHRONIC KIDNEY DISEASE, WITHOUT LONG-TERM CURRENT USE OF INSULIN (HCC): Primary | ICD-10-CM

## 2019-03-20 DIAGNOSIS — E11.40 TYPE 2 DIABETES MELLITUS WITH DIABETIC NEUROPATHY, WITHOUT LONG-TERM CURRENT USE OF INSULIN (HCC): ICD-10-CM

## 2019-03-20 DIAGNOSIS — G47.33 OSA (OBSTRUCTIVE SLEEP APNEA): ICD-10-CM

## 2019-03-20 DIAGNOSIS — E16.2 HYPOGLYCEMIA: ICD-10-CM

## 2019-03-20 DIAGNOSIS — E78.2 MIXED HYPERLIPIDEMIA: ICD-10-CM

## 2019-03-20 PROCEDURE — 99214 OFFICE O/P EST MOD 30 MIN: CPT | Performed by: INTERNAL MEDICINE

## 2019-03-20 NOTE — PATIENT INSTRUCTIONS
10% - bad control"> 10% - bad control,Hemoglobin A1c (HbA1c) greater than 10% indicating poor diabetic control,Haemoglobin A1c greater than 10% indicating poor diabetic control">   Diabetes Mellitus Type 2 in Adults, Ambulatory Care   GENERAL INFORMATION:   Diabetes mellitus type 2  is a disease that affects how your body uses glucose (sugar)  Insulin helps move sugar out of the blood so it can be used for energy  Normally, when the blood sugar level increases, the pancreas makes more insulin  Type 2 diabetes develops because either the body cannot make enough insulin, or it cannot use the insulin correctly  After many years, your pancreas may stop making insulin  Common symptoms include the following:   · More hunger or thirst than usual     · Frequent urination     · Weight loss without trying     · Blurred vision  Seek immediate care for the following symptoms:   · Severe abdominal pain, or pain that spreads to your back  You may also be vomiting  · Trouble staying awake or focusing    · Shaking or sweating    · Blurred or double vision    · Breath has a fruity, sweet smell    · Breathing is deep and labored, or rapid and shallow    · Heartbeat is fast and weak  Treatment for diabetes mellitus type 2  includes keeping your blood sugar at a normal level  You must eat the right foods, and exercise regularly  You may also need medicine if you cannot control your blood sugar level with nutrition and exercise  Manage diabetes mellitus type 2:   · Check your blood sugar level  You will be taught how to check a small drop of blood in a glucose monitor  Ask your healthcare provider when and how often to check during the day  Ask your healthcare provider what your blood sugar levels should be when you check them  · Keep track of carbohydrates (sugar and starchy foods)  Your blood sugar level can get too high if you eat too many carbohydrates   Your dietitian will help you plan meals and snacks that have the right amount of carbohydrates  · Eat low-fat foods  Some examples are skinless chicken and low-fat milk  · Eat less sodium (salt)  Some examples of high-sodium foods to limit are soy sauce, potato chips, and soup  Do not add salt to food you cook  Limit your use of table salt  · Eat high-fiber foods  Foods that are a good source of fiber include vegetables, whole grain bread, and beans  · Limit alcohol  Alcohol affects your blood sugar level and can make it harder to manage your diabetes  Women should limit alcohol to 1 drink a day  Men should limit alcohol to 2 drinks a day  A drink of alcohol is 12 ounces of beer, 5 ounces of wine, or 1½ ounces of liquor  · Get regular exercise  Exercise can help keep your blood sugar level steady, decrease your risk of heart disease, and help you lose weight  Exercise for at least 30 minutes, 5 days a week  Include muscle strengthening activities 2 days each week  Work with your healthcare provider to create an exercise plan  · Check your feet each day  for injuries or open sores  Ask your healthcare provider for activities you can do if you have an open sore  · Quit smoking  If you smoke, it is never too late to quit  Smoking can worsen the problems that may occur with diabetes  Ask your healthcare provider for information about how to stop smoking if you are having trouble quitting  · Ask about your weight:  Ask healthcare providers if you need to lose weight, and how much to lose  Ask them to help you with a weight loss program  Even a 10 to 15 pound weight loss can help you manage your blood sugar level  · Carry medical alert identification  Wear medical alert jewelry or carry a card that says you have diabetes  Ask your healthcare provider where to get these items  · Ask about vaccines  Diabetes puts you at risk of serious illness if you get the flu, pneumonia, or hepatitis   Ask your healthcare provider if you should get a flu, pneumonia, or hepatitis B vaccine, and when to get the vaccine  Follow up with your healthcare provider as directed:  Write down your questions so you remember to ask them during your visits  CARE AGREEMENT:   You have the right to help plan your care  Learn about your health condition and how it may be treated  Discuss treatment options with your caregivers to decide what care you want to receive  You always have the right to refuse treatment  The above information is an  only  It is not intended as medical advice for individual conditions or treatments  Talk to your doctor, nurse or pharmacist before following any medical regimen to see if it is safe and effective for you  © 2014 [address] is for End User's use only and may not be sold, redistributed or otherwise used for commercial purposes  All illustrations and images included in CareNotes® are the copyrighted property of A D A M [employer]  or Buster Giraldo

## 2019-03-20 NOTE — PROGRESS NOTES
Grace Lyon 70 y o  male MRN: [id number]    Encounter: [id number]      Assessment/Plan     Assessment: This is a 70y o -year-old male with diabetes with hyperglycemia, hypertension and hyperlipidemia  He has stage 3 chronic kidney disease, obstructive sleep apnea and was hospitalized for severe hypoglycemia  Plan:  1  Type 2 diabetes with hypo and hyperglycemia-now is blood sugars are running high since he is off insulin, glp one and sulfonylurea  Today, we discussed going back on glimepiride 2 mg twice a day due to blood sugars being over 200 mg/dL consistently  He is agreeable to this  He is to send me his blood sugar log in two weeks so I can make further adjustments  2  Stage 3 chronic kidney disease is managed by Nephrology  3  He uses CPAP for sleep apnea  4  Hyperlipidemia- continue statin and fish oil  5  Hypertension-continue current regimen  CC: Diabetes    History of Present Illness     HPI:  69-year-old male with type 2 diabetes for several years who was recently hospitalized with severe hypoglycemia  Since then, he has been off insulin, glp one and sulfonylurea  He has noticed that his blood sugars are now running high  He has seen his ophthalmologist last week  For the hypertension, he is on multiple medications  He denies any headaches or cough  The hyperlipidemia, he is on fish oil and statin  He denies any myalgia  He follows with Nephrology for stage 3 chronic kidney disease  Review of Systems   Constitutional: Negative for chills and fever  Respiratory: Negative for shortness of breath  Cardiovascular: Negative for chest pain  Gastrointestinal: Negative for constipation, diarrhea, nausea and vomiting  Endocrine: Negative for polydipsia and polyuria  Neurological: Negative for numbness  All other systems reviewed and are negative        Historical Information   Past Medical History:   Diagnosis Date    Arthritis     OA    Bruise of both arms     forearms and both hands    Bruises easily     [facility] [address] [facility]     Diabetes mellitus [facility] [address] [facility]     Diabetic foot ulcer [facility] [address] [facility]     Eczema     Erectile dysfunction     Gout     Hyperlipidemia     Hypertension     Murmur     Nephropathy     Osteoarthritis     [facility]     Seasonal allergies     Toe infection     bilat great toes    Vertigo     Walks frequently     Wears dentures     upper    Wears glasses      Past Surgical History:   Procedure Laterality Date    CARDIAC PACEMAKER PLACEMENT      CARPAL TUNNEL RELEASE Left     CARPAL TUNNEL RELEASE Right     CARPAL TUNNEL RELEASE      KNEE ARTHROSCOPY Left     KNEE ARTHROSCOPY Right     KNEE SURGERY      FL AMPUTATION TOE,I-P JT Bilateral 5/2/2017    Procedure: PARTIAL AMPUTATION RIGHT AND LEFT HALLUX ;  Surgeon: Prieto Watson DPM;  Location: [facility];  Service: Podiatry    FL AMPUTATION TOE,MT-P JT Left 7/25/2017    Procedure: 2ND TOE AMPUTATION;  Surgeon: Prieto Watson DPM;  Location: [facility];  Service: Podiatry    SHOULDER ARTHROSCOPY Left     with screws,RTC    SHOULDER SURGERY      VASECTOMY       Social History   Social History     Substance and Sexual Activity   Alcohol Use No     Social History     Substance and Sexual Activity   Drug Use No     Social History     Tobacco Use   Smoking Status Former Smoker    Packs/day: 1 00    Years: 30 00    Pack years: 30 00    Types: Cigarettes   Smokeless Tobacco Never Used   Tobacco Comment    quit 10 years ago     Family History:   Family History   Problem Relation Age of Onset    Heart disease Father     No Known Problems Mother     Hypertension Father     Pulmonary embolism Father        Meds/Allergies   Current Outpatient Medications   Medication Sig Dispense Refill    allopurinol (ZYLOPRIM) 300 mg tablet TAKE 1 TABLET (300 MG TOTAL) BY MOUTH EVERY MORNING 90 tablet 2    amLODIPine (NORVASC) 10 mg tablet Take 1 tablet (10 mg total) by mouth daily 90 tablet 3    apixaban (ELIQUIS) 5 mg Take 1 tablet (5 mg total) by mouth every 12 (twelve) hours 60 tablet 5    betamethasone valerate (VALISONE) 0 1 % cream APPLY TO BACK,ARMS,CHEST,LEGS AND ABDOMEN TWICE A DAY  3    Cholecalciferol (VITAMIN D-3) 1000 units CAPS Take 1 capsule by mouth every morning        furosemide (LASIX) 40 mg tablet Take 1 tablet (40 mg total) by mouth daily 180 tablet 0    glimepiride (AMARYL) 4 mg tablet TAKE 1 TABLET BY MOUTH TWICE A DAY 60 tablet 3    glucagon (GLUCAGON EMERGENCY) 1 MG injection Inject 1 mg under the skin once as needed for low blood sugar for up to 1 dose 1 kit 2    glucose blood (ONE TOUCH ULTRA TEST) test strip 1 each by Other route 3 (three) times a day 100 each 3    hydrOXYzine HCL (ATARAX) 10 mg tablet Take 10 mg by mouth every 6 (six) hours as needed        Insulin Degludec-Liraglutide (XULTOPHY) 100 units-3 6 mg/mL injection pen Inject 30 Units under the skin daily for 90 days 30 pen 0    losartan (COZAAR) 50 mg tablet Take 1 5 tablets (75 mg total) by mouth daily 180 tablet 3    metFORMIN (GLUCOPHAGE) 500 mg tablet Take 2 tablets (1,000 mg total) by mouth 2 (two) times a day with meals [id number] /twice a day 360 tablet 3    metoprolol tartrate (LOPRESSOR) 50 mg tablet Take 1 tablet (50 mg total) by mouth every 12 (twelve) hours 180 tablet 3    Multiple Vitamin (MULTIVITAMIN) tablet Take 1 tablet by mouth daily      omega-3-acid ethyl esters (LOVAZA) 1 g capsule Take 2 capsules (2 g total) by mouth 2 (two) times a day 360 capsule 3    simvastatin (ZOCOR) 20 mg tablet Take 1 tablet (20 mg total) by mouth daily at bedtime 90 tablet 3    tamsulosin (FLOMAX) 0 4 mg Take 1 capsule (0 4 mg total) by mouth daily with dinner 90 capsule 0     No current facility-administered medications for this visit        Allergies   Allergen Reactions    Invokana [Canagliflozin]     Lamisil [Terbinafine] Rash    Lamisil [Terbinafine] Blisters Wife states " His skin peeled from head to toe"    Other Swelling     Pomegranate - facial swelling, no swelling of tongue, esophagus  Adhesive tape   Latex Rash    Latex     Other Rash     palmegranate       Objective   Vitals: Blood pressure 140/62, pulse 68, height 5' 10" (1 778 m), weight 100 kg (221 lb 6 4 oz)  Physical Exam   Constitutional: He is oriented to person, place, and time  He appears well-developed and well-nourished  No distress  HENT:   Head: Normocephalic and atraumatic  Mouth/Throat: Oropharynx is clear and moist and mucous membranes are normal  No oropharyngeal exudate  Eyes: Conjunctivae, EOM and lids are normal  Right eye exhibits no discharge  Left eye exhibits no discharge  No scleral icterus  Neck: Neck supple  No thyromegaly present  Cardiovascular: Normal rate, regular rhythm and normal heart sounds  Exam reveals no gallop and no friction rub  No murmur heard  Pulmonary/Chest: Effort normal and breath sounds normal  No respiratory distress  He has no wheezes  Abdominal: Soft  Bowel sounds are normal  He exhibits no distension  There is no tenderness  Musculoskeletal: Normal range of motion  He exhibits no edema, tenderness or deformity  Lymphadenopathy:        Head (right side): No occipital adenopathy present  Head (left side): No occipital adenopathy present  Right: No supraclavicular adenopathy present  Left: No supraclavicular adenopathy present  Neurological: He is alert and oriented to person, place, and time  No cranial nerve deficit  Coordination normal    Skin: Skin is warm and intact  No rash noted  He is not diaphoretic  No erythema  Psychiatric: He has a normal mood and affect  His behavior is normal    Vitals reviewed  The history was obtained from the review of the chart, patient      Lab Results:   Lab Results   Component Value Date/Time    Hemoglobin A1C 6 9 (H) 03/07/2019 09:47 AM    Hemoglobin A1C 6 5 02/22/2019 08:24 AM    Hemoglobin A1C 6 7 (H) 10/12/2018 11:39 AM    Hemoglobin A1C 7 1 (H) 06/28/2018 08:23 AM    WBC 15 20 (H) 03/05/2019 05:54 PM    WBC 10 91 (H) 12/03/2018 09:49 AM    WBC 11 42 (H) 11/13/2018 05:41 AM    Hemoglobin 11 0 (L) 03/05/2019 05:54 PM    Hemoglobin 10 1 (L) 12/03/2018 09:49 AM    Hemoglobin 8 7 (L) 11/13/2018 05:41 AM    Hematocrit 33 3 (L) 03/05/2019 05:54 PM    Hematocrit 31 5 (L) 12/03/2018 09:49 AM    Hematocrit 26 2 (L) 11/13/2018 05:41 AM    MCV 89 03/05/2019 05:54 PM    MCV 94 12/03/2018 09:49 AM    MCV 92 11/13/2018 05:41 AM    Platelets 136 90/52/1077 05:54 PM    Platelets 625 58/50/9051 09:49 AM    Platelets 019 (H) 34/02/9308 05:41 AM    BUN 37 (H) 03/07/2019 09:47 AM    BUN 33 (H) 03/05/2019 05:54 PM    BUN 40 (H) 12/03/2018 09:49 AM    Potassium 5 1 03/07/2019 09:47 AM    Potassium 4 5 03/05/2019 05:54 PM    Potassium 5 1 12/03/2018 09:49 AM    Chloride 104 03/07/2019 09:47 AM    Chloride 100 03/05/2019 05:54 PM    Chloride 107 12/03/2018 09:49 AM    CO2 22 03/07/2019 09:47 AM    CO2 22 03/05/2019 05:54 PM    CO2 22 12/03/2018 09:49 AM    CO2, i-STAT 7 (LL) 11/01/2018 04:30 PM    Creatinine 1 33 (H) 03/07/2019 09:47 AM    Creatinine 1 38 (H) 03/05/2019 05:54 PM    Creatinine 0 95 12/03/2018 09:49 AM    AST 23 03/07/2019 09:47 AM    AST 32 03/05/2019 05:54 PM    AST 20 12/03/2018 09:49 AM    ALT 25 03/07/2019 09:47 AM    ALT 30 03/05/2019 05:54 PM    ALT 23 12/03/2018 09:49 AM    Albumin 2 3 (L) 03/07/2019 09:47 AM    Albumin 2 4 (L) 03/05/2019 05:54 PM    Albumin 3 0 (L) 12/03/2018 09:49 AM    HDL, Direct 33 (L) 03/07/2019 09:47 AM    HDL, Direct 33 (L) 10/12/2018 11:39 AM    HDL, Direct 36 (L) 06/28/2018 08:23 AM    Triglycerides 111 03/07/2019 09:47 AM    Triglycerides 183 (H) 10/12/2018 11:39 AM    Triglycerides 120 06/28/2018 08:23 AM       Imaging Studies: I have personally reviewed pertinent reports        Portions of the record may have been created with voice recognition software  Occasional wrong word or "sound a like" substitutions may have occurred due to the inherent limitations of voice recognition software  Read the chart carefully and recognize, using context, where substitutions have occurred

## 2019-04-08 ENCOUNTER — TELEPHONE (OUTPATIENT)
Dept: ENDOCRINOLOGY | Facility: CLINIC | Age: 64
End: 2019-04-08

## 2019-04-23 ENCOUNTER — TELEPHONE (OUTPATIENT)
Dept: ENDOCRINOLOGY | Facility: CLINIC | Age: 64
End: 2019-04-23

## 2019-04-25 ENCOUNTER — TELEPHONE (OUTPATIENT)
Dept: CARDIOLOGY CLINIC | Facility: CLINIC | Age: 64
End: 2019-04-25

## 2019-05-01 ENCOUNTER — APPOINTMENT (EMERGENCY)
Dept: RADIOLOGY | Facility: HOSPITAL | Age: 64
DRG: 291 | End: 2019-05-01
Payer: MEDICARE

## 2019-05-01 ENCOUNTER — HOSPITAL ENCOUNTER (INPATIENT)
Facility: HOSPITAL | Age: 64
LOS: 7 days | Discharge: HOME | DRG: 291 | End: 2019-05-08
Attending: EMERGENCY MEDICINE | Admitting: FAMILY MEDICINE
Payer: MEDICARE

## 2019-05-01 DIAGNOSIS — N28.9 RENAL INSUFFICIENCY: ICD-10-CM

## 2019-05-01 DIAGNOSIS — R73.9 HYPERGLYCEMIA: ICD-10-CM

## 2019-05-01 DIAGNOSIS — J18.9 PNEUMONIA: ICD-10-CM

## 2019-05-01 DIAGNOSIS — I50.31 ACUTE DIASTOLIC (CONGESTIVE) HEART FAILURE (HCC): ICD-10-CM

## 2019-05-01 DIAGNOSIS — I10 ESSENTIAL HYPERTENSION: ICD-10-CM

## 2019-05-01 DIAGNOSIS — R09.02 HYPOXIA: ICD-10-CM

## 2019-05-01 DIAGNOSIS — I50.9 CHF (CONGESTIVE HEART FAILURE) (HCC): Primary | ICD-10-CM

## 2019-05-01 PROBLEM — N17.9 ACUTE RENAL FAILURE SUPERIMPOSED ON STAGE 3 CHRONIC KIDNEY DISEASE (HCC): Status: ACTIVE | Noted: 2018-11-14

## 2019-05-01 PROBLEM — I50.33 ACUTE ON CHRONIC DIASTOLIC CHF (CONGESTIVE HEART FAILURE) (HCC): Status: ACTIVE | Noted: 2018-11-12

## 2019-05-01 LAB
ANION GAP SERPL CALCULATED.3IONS-SCNC: 13 MMOL/L (ref 4–13)
APTT PPP: 36 SECONDS (ref 26–38)
ATRIAL RATE: 208 BPM
BASOPHILS # BLD AUTO: 0.11 THOUSANDS/ΜL (ref 0–0.1)
BASOPHILS NFR BLD AUTO: 1 % (ref 0–1)
BUN SERPL-MCNC: 33 MG/DL (ref 5–25)
CALCIUM SERPL-MCNC: 9.9 MG/DL (ref 8.3–10.1)
CHLORIDE SERPL-SCNC: 99 MMOL/L (ref 100–108)
CO2 SERPL-SCNC: 21 MMOL/L (ref 21–32)
CREAT SERPL-MCNC: 1.58 MG/DL (ref 0.6–1.3)
EOSINOPHIL # BLD AUTO: 0.76 THOUSAND/ΜL (ref 0–0.61)
EOSINOPHIL NFR BLD AUTO: 5 % (ref 0–6)
ERYTHROCYTE [DISTWIDTH] IN BLOOD BY AUTOMATED COUNT: 15.6 % (ref 11.6–15.1)
GFR SERPL CREATININE-BSD FRML MDRD: 45 ML/MIN/1.73SQ M
GLUCOSE SERPL-MCNC: 233 MG/DL (ref 65–140)
GLUCOSE SERPL-MCNC: 250 MG/DL (ref 65–140)
GLUCOSE SERPL-MCNC: 304 MG/DL (ref 65–140)
HCT VFR BLD AUTO: 33.5 % (ref 36.5–49.3)
HGB BLD-MCNC: 10.9 G/DL (ref 12–17)
IMM GRANULOCYTES # BLD AUTO: 0.05 THOUSAND/UL (ref 0–0.2)
IMM GRANULOCYTES NFR BLD AUTO: 0 % (ref 0–2)
INR PPP: 1.22 (ref 0.86–1.17)
LYMPHOCYTES # BLD AUTO: 1.33 THOUSANDS/ΜL (ref 0.6–4.47)
LYMPHOCYTES NFR BLD AUTO: 9 % (ref 14–44)
MCH RBC QN AUTO: 30.4 PG (ref 26.8–34.3)
MCHC RBC AUTO-ENTMCNC: 32.5 G/DL (ref 31.4–37.4)
MCV RBC AUTO: 94 FL (ref 82–98)
MONOCYTES # BLD AUTO: 0.61 THOUSAND/ΜL (ref 0.17–1.22)
MONOCYTES NFR BLD AUTO: 4 % (ref 4–12)
NEUTROPHILS # BLD AUTO: 12.34 THOUSANDS/ΜL (ref 1.85–7.62)
NEUTS SEG NFR BLD AUTO: 81 % (ref 43–75)
NRBC BLD AUTO-RTO: 0 /100 WBCS
NT-PROBNP SERPL-MCNC: 530 PG/ML
PLATELET # BLD AUTO: 384 THOUSANDS/UL (ref 149–390)
PMV BLD AUTO: 9.5 FL (ref 8.9–12.7)
POTASSIUM SERPL-SCNC: 5.4 MMOL/L (ref 3.5–5.3)
PROCALCITONIN SERPL-MCNC: 0.08 NG/ML
PROTHROMBIN TIME: 15.5 SECONDS (ref 11.8–14.2)
QRS AXIS: -58 DEGREES
QRSD INTERVAL: 196 MS
QT INTERVAL: 444 MS
QTC INTERVAL: 540 MS
RBC # BLD AUTO: 3.58 MILLION/UL (ref 3.88–5.62)
SODIUM SERPL-SCNC: 133 MMOL/L (ref 136–145)
T WAVE AXIS: 104 DEGREES
TROPONIN I SERPL-MCNC: 0.02 NG/ML
TROPONIN I SERPL-MCNC: 0.04 NG/ML
TROPONIN I SERPL-MCNC: 0.07 NG/ML
VENTRICULAR RATE: 89 BPM
WBC # BLD AUTO: 15.2 THOUSAND/UL (ref 4.31–10.16)

## 2019-05-01 PROCEDURE — 94660 CPAP INITIATION&MGMT: CPT

## 2019-05-01 PROCEDURE — 85610 PROTHROMBIN TIME: CPT | Performed by: EMERGENCY MEDICINE

## 2019-05-01 PROCEDURE — 85730 THROMBOPLASTIN TIME PARTIAL: CPT | Performed by: EMERGENCY MEDICINE

## 2019-05-01 PROCEDURE — 96365 THER/PROPH/DIAG IV INF INIT: CPT

## 2019-05-01 PROCEDURE — 96375 TX/PRO/DX INJ NEW DRUG ADDON: CPT

## 2019-05-01 PROCEDURE — 99291 CRITICAL CARE FIRST HOUR: CPT

## 2019-05-01 PROCEDURE — 84145 PROCALCITONIN (PCT): CPT | Performed by: PHYSICIAN ASSISTANT

## 2019-05-01 PROCEDURE — 71045 X-RAY EXAM CHEST 1 VIEW: CPT

## 2019-05-01 PROCEDURE — 99291 CRITICAL CARE FIRST HOUR: CPT | Performed by: EMERGENCY MEDICINE

## 2019-05-01 PROCEDURE — 80048 BASIC METABOLIC PNL TOTAL CA: CPT | Performed by: EMERGENCY MEDICINE

## 2019-05-01 PROCEDURE — 93005 ELECTROCARDIOGRAM TRACING: CPT

## 2019-05-01 PROCEDURE — 99223 1ST HOSP IP/OBS HIGH 75: CPT | Performed by: FAMILY MEDICINE

## 2019-05-01 PROCEDURE — 82948 REAGENT STRIP/BLOOD GLUCOSE: CPT

## 2019-05-01 PROCEDURE — 85025 COMPLETE CBC W/AUTO DIFF WBC: CPT | Performed by: EMERGENCY MEDICINE

## 2019-05-01 PROCEDURE — 93010 ELECTROCARDIOGRAM REPORT: CPT | Performed by: INTERNAL MEDICINE

## 2019-05-01 PROCEDURE — 83880 ASSAY OF NATRIURETIC PEPTIDE: CPT | Performed by: EMERGENCY MEDICINE

## 2019-05-01 PROCEDURE — 84484 ASSAY OF TROPONIN QUANT: CPT | Performed by: EMERGENCY MEDICINE

## 2019-05-01 PROCEDURE — 84484 ASSAY OF TROPONIN QUANT: CPT | Performed by: PHYSICIAN ASSISTANT

## 2019-05-01 PROCEDURE — 36415 COLL VENOUS BLD VENIPUNCTURE: CPT | Performed by: EMERGENCY MEDICINE

## 2019-05-01 PROCEDURE — 94762 N-INVAS EAR/PLS OXIMTRY CONT: CPT

## 2019-05-01 PROCEDURE — 99222 1ST HOSP IP/OBS MODERATE 55: CPT | Performed by: INTERNAL MEDICINE

## 2019-05-01 RX ORDER — FUROSEMIDE 10 MG/ML
40 INJECTION INTRAMUSCULAR; INTRAVENOUS
Status: DISCONTINUED | OUTPATIENT
Start: 2019-05-02 | End: 2019-05-02

## 2019-05-01 RX ORDER — MELATONIN
1000 DAILY
Status: DISCONTINUED | OUTPATIENT
Start: 2019-05-02 | End: 2019-05-08 | Stop reason: HOSPADM

## 2019-05-01 RX ORDER — FUROSEMIDE 10 MG/ML
40 INJECTION INTRAMUSCULAR; INTRAVENOUS ONCE
Status: COMPLETED | OUTPATIENT
Start: 2019-05-01 | End: 2019-05-01

## 2019-05-01 RX ORDER — DOCUSATE SODIUM 100 MG/1
100 CAPSULE, LIQUID FILLED ORAL 2 TIMES DAILY
Status: DISCONTINUED | OUTPATIENT
Start: 2019-05-01 | End: 2019-05-08 | Stop reason: HOSPADM

## 2019-05-01 RX ORDER — ALLOPURINOL 100 MG/1
300 TABLET ORAL EVERY MORNING
Status: DISCONTINUED | OUTPATIENT
Start: 2019-05-02 | End: 2019-05-08 | Stop reason: HOSPADM

## 2019-05-01 RX ORDER — AMLODIPINE BESYLATE 10 MG/1
10 TABLET ORAL DAILY
Status: DISCONTINUED | OUTPATIENT
Start: 2019-05-02 | End: 2019-05-08 | Stop reason: HOSPADM

## 2019-05-01 RX ORDER — ALBUTEROL SULFATE 2.5 MG/3ML
2 SOLUTION RESPIRATORY (INHALATION) ONCE
Status: COMPLETED | OUTPATIENT
Start: 2019-05-01 | End: 2019-05-01

## 2019-05-01 RX ORDER — METOPROLOL TARTRATE 50 MG/1
50 TABLET, FILM COATED ORAL EVERY 12 HOURS SCHEDULED
Status: DISCONTINUED | OUTPATIENT
Start: 2019-05-01 | End: 2019-05-08 | Stop reason: HOSPADM

## 2019-05-01 RX ORDER — NITROGLYCERIN 20 MG/100ML
5-200 INJECTION INTRAVENOUS
Status: DISCONTINUED | OUTPATIENT
Start: 2019-05-01 | End: 2019-05-01

## 2019-05-01 RX ORDER — TAMSULOSIN HYDROCHLORIDE 0.4 MG/1
0.4 CAPSULE ORAL
Status: DISCONTINUED | OUTPATIENT
Start: 2019-05-02 | End: 2019-05-08 | Stop reason: HOSPADM

## 2019-05-01 RX ORDER — ACETAMINOPHEN 325 MG/1
650 TABLET ORAL EVERY 6 HOURS PRN
Status: DISCONTINUED | OUTPATIENT
Start: 2019-05-01 | End: 2019-05-08 | Stop reason: HOSPADM

## 2019-05-01 RX ORDER — SENNOSIDES 8.6 MG
1 TABLET ORAL DAILY
Status: DISCONTINUED | OUTPATIENT
Start: 2019-05-02 | End: 2019-05-08 | Stop reason: HOSPADM

## 2019-05-01 RX ORDER — CHLORAL HYDRATE 500 MG
1000 CAPSULE ORAL DAILY
Status: DISCONTINUED | OUTPATIENT
Start: 2019-05-02 | End: 2019-05-08 | Stop reason: HOSPADM

## 2019-05-01 RX ORDER — PRAVASTATIN SODIUM 40 MG
40 TABLET ORAL
Status: DISCONTINUED | OUTPATIENT
Start: 2019-05-02 | End: 2019-05-08 | Stop reason: HOSPADM

## 2019-05-01 RX ORDER — HYDROXYZINE HYDROCHLORIDE 10 MG/1
10 TABLET, FILM COATED ORAL EVERY 6 HOURS PRN
Status: DISCONTINUED | OUTPATIENT
Start: 2019-05-01 | End: 2019-05-08 | Stop reason: HOSPADM

## 2019-05-01 RX ORDER — ONDANSETRON 2 MG/ML
4 INJECTION INTRAMUSCULAR; INTRAVENOUS EVERY 6 HOURS PRN
Status: DISCONTINUED | OUTPATIENT
Start: 2019-05-01 | End: 2019-05-08 | Stop reason: HOSPADM

## 2019-05-01 RX ADMIN — INSULIN LISPRO 3 UNITS: 100 INJECTION, SOLUTION INTRAVENOUS; SUBCUTANEOUS at 18:14

## 2019-05-01 RX ADMIN — FUROSEMIDE 40 MG: 10 INJECTION, SOLUTION INTRAMUSCULAR; INTRAVENOUS at 13:31

## 2019-05-01 RX ADMIN — INSULIN LISPRO 2 UNITS: 100 INJECTION, SOLUTION INTRAVENOUS; SUBCUTANEOUS at 21:30

## 2019-05-01 RX ADMIN — APIXABAN 5 MG: 5 TABLET, FILM COATED ORAL at 17:16

## 2019-05-01 RX ADMIN — NITROGLYCERIN 10 MCG/MIN: 20 INJECTION INTRAVENOUS at 13:42

## 2019-05-01 RX ADMIN — METOPROLOL TARTRATE 50 MG: 25 TABLET, FILM COATED ORAL at 20:16

## 2019-05-02 ENCOUNTER — APPOINTMENT (INPATIENT)
Dept: NON INVASIVE DIAGNOSTICS | Facility: HOSPITAL | Age: 64
DRG: 291 | End: 2019-05-02
Payer: MEDICARE

## 2019-05-02 PROBLEM — R50.9 FEVER: Status: ACTIVE | Noted: 2019-05-02

## 2019-05-02 PROBLEM — D72.829 LEUKOCYTOSIS: Status: ACTIVE | Noted: 2019-05-02

## 2019-05-02 PROBLEM — D64.9 ANEMIA: Status: ACTIVE | Noted: 2019-05-02

## 2019-05-02 LAB
ANION GAP SERPL CALCULATED.3IONS-SCNC: 11 MMOL/L (ref 4–13)
BUN SERPL-MCNC: 32 MG/DL (ref 5–25)
CALCIUM SERPL-MCNC: 9.6 MG/DL (ref 8.3–10.1)
CHLORIDE SERPL-SCNC: 105 MMOL/L (ref 100–108)
CO2 SERPL-SCNC: 22 MMOL/L (ref 21–32)
CREAT SERPL-MCNC: 1.45 MG/DL (ref 0.6–1.3)
ERYTHROCYTE [DISTWIDTH] IN BLOOD BY AUTOMATED COUNT: 15.2 % (ref 11.6–15.1)
GFR SERPL CREATININE-BSD FRML MDRD: 50 ML/MIN/1.73SQ M
GLUCOSE SERPL-MCNC: 146 MG/DL (ref 65–140)
GLUCOSE SERPL-MCNC: 154 MG/DL (ref 65–140)
GLUCOSE SERPL-MCNC: 166 MG/DL (ref 65–140)
GLUCOSE SERPL-MCNC: 71 MG/DL (ref 65–140)
GLUCOSE SERPL-MCNC: 90 MG/DL (ref 65–140)
HCT VFR BLD AUTO: 29.2 % (ref 36.5–49.3)
HGB BLD-MCNC: 9.4 G/DL (ref 12–17)
MAGNESIUM SERPL-MCNC: 2.3 MG/DL (ref 1.6–2.6)
MCH RBC QN AUTO: 29.8 PG (ref 26.8–34.3)
MCHC RBC AUTO-ENTMCNC: 32.2 G/DL (ref 31.4–37.4)
MCV RBC AUTO: 93 FL (ref 82–98)
PLATELET # BLD AUTO: 286 THOUSANDS/UL (ref 149–390)
PMV BLD AUTO: 9.2 FL (ref 8.9–12.7)
POTASSIUM SERPL-SCNC: 4.4 MMOL/L (ref 3.5–5.3)
RBC # BLD AUTO: 3.15 MILLION/UL (ref 3.88–5.62)
SODIUM SERPL-SCNC: 138 MMOL/L (ref 136–145)
TROPONIN I SERPL-MCNC: 0.03 NG/ML
WBC # BLD AUTO: 11.03 THOUSAND/UL (ref 4.31–10.16)

## 2019-05-02 PROCEDURE — 80048 BASIC METABOLIC PNL TOTAL CA: CPT | Performed by: PHYSICIAN ASSISTANT

## 2019-05-02 PROCEDURE — 94660 CPAP INITIATION&MGMT: CPT

## 2019-05-02 PROCEDURE — 83735 ASSAY OF MAGNESIUM: CPT | Performed by: PHYSICIAN ASSISTANT

## 2019-05-02 PROCEDURE — 94762 N-INVAS EAR/PLS OXIMTRY CONT: CPT

## 2019-05-02 PROCEDURE — C8929 TTE W OR WO FOL WCON,DOPPLER: HCPCS

## 2019-05-02 PROCEDURE — 82948 REAGENT STRIP/BLOOD GLUCOSE: CPT

## 2019-05-02 PROCEDURE — 99232 SBSQ HOSP IP/OBS MODERATE 35: CPT | Performed by: INTERNAL MEDICINE

## 2019-05-02 PROCEDURE — 99233 SBSQ HOSP IP/OBS HIGH 50: CPT | Performed by: STUDENT IN AN ORGANIZED HEALTH CARE EDUCATION/TRAINING PROGRAM

## 2019-05-02 PROCEDURE — 85027 COMPLETE CBC AUTOMATED: CPT | Performed by: PHYSICIAN ASSISTANT

## 2019-05-02 PROCEDURE — 87040 BLOOD CULTURE FOR BACTERIA: CPT | Performed by: STUDENT IN AN ORGANIZED HEALTH CARE EDUCATION/TRAINING PROGRAM

## 2019-05-02 PROCEDURE — 84484 ASSAY OF TROPONIN QUANT: CPT | Performed by: STUDENT IN AN ORGANIZED HEALTH CARE EDUCATION/TRAINING PROGRAM

## 2019-05-02 RX ORDER — FUROSEMIDE 10 MG/ML
80 INJECTION INTRAMUSCULAR; INTRAVENOUS
Status: DISCONTINUED | OUTPATIENT
Start: 2019-05-02 | End: 2019-05-04

## 2019-05-02 RX ORDER — METRONIDAZOLE 500 MG/1
500 TABLET ORAL EVERY 8 HOURS SCHEDULED
Status: DISCONTINUED | OUTPATIENT
Start: 2019-05-02 | End: 2019-05-02 | Stop reason: SDUPTHER

## 2019-05-02 RX ORDER — METRONIDAZOLE 500 MG/1
500 TABLET ORAL EVERY 8 HOURS SCHEDULED
Status: DISCONTINUED | OUTPATIENT
Start: 2019-05-02 | End: 2019-05-06

## 2019-05-02 RX ADMIN — AMLODIPINE BESYLATE 10 MG: 10 TABLET ORAL at 09:07

## 2019-05-02 RX ADMIN — FUROSEMIDE 40 MG: 10 INJECTION, SOLUTION INTRAMUSCULAR; INTRAVENOUS at 09:15

## 2019-05-02 RX ADMIN — ACETAMINOPHEN 650 MG: 325 TABLET ORAL at 21:10

## 2019-05-02 RX ADMIN — ALLOPURINOL 300 MG: 100 TABLET ORAL at 09:07

## 2019-05-02 RX ADMIN — CEFEPIME HYDROCHLORIDE 2000 MG: 2 INJECTION, POWDER, FOR SOLUTION INTRAVENOUS at 17:17

## 2019-05-02 RX ADMIN — APIXABAN 5 MG: 5 TABLET, FILM COATED ORAL at 09:07

## 2019-05-02 RX ADMIN — METOPROLOL TARTRATE 50 MG: 25 TABLET, FILM COATED ORAL at 21:09

## 2019-05-02 RX ADMIN — FUROSEMIDE 80 MG: 10 INJECTION, SOLUTION INTRAMUSCULAR; INTRAVENOUS at 17:18

## 2019-05-02 RX ADMIN — TAMSULOSIN HYDROCHLORIDE 0.4 MG: 0.4 CAPSULE ORAL at 17:18

## 2019-05-02 RX ADMIN — VITAMIN D, TAB 1000IU (100/BT) 1000 UNITS: 25 TAB at 09:07

## 2019-05-02 RX ADMIN — METOPROLOL TARTRATE 50 MG: 25 TABLET, FILM COATED ORAL at 09:08

## 2019-05-02 RX ADMIN — INSULIN LISPRO 1 UNITS: 100 INJECTION, SOLUTION INTRAVENOUS; SUBCUTANEOUS at 21:20

## 2019-05-02 RX ADMIN — APIXABAN 5 MG: 5 TABLET, FILM COATED ORAL at 17:18

## 2019-05-02 RX ADMIN — PERFLUTREN 0.8 ML/MIN: 6.52 INJECTION, SUSPENSION INTRAVENOUS at 09:09

## 2019-05-02 RX ADMIN — INSULIN LISPRO 1 UNITS: 100 INJECTION, SOLUTION INTRAVENOUS; SUBCUTANEOUS at 11:42

## 2019-05-02 RX ADMIN — METRONIDAZOLE 500 MG: 500 TABLET, FILM COATED ORAL at 21:09

## 2019-05-02 RX ADMIN — PRAVASTATIN SODIUM 40 MG: 40 TABLET ORAL at 17:17

## 2019-05-02 RX ADMIN — Medication 1000 MG: at 09:07

## 2019-05-02 RX ADMIN — ONDANSETRON 4 MG: 2 INJECTION INTRAMUSCULAR; INTRAVENOUS at 09:11

## 2019-05-03 ENCOUNTER — APPOINTMENT (INPATIENT)
Dept: RADIOLOGY | Facility: HOSPITAL | Age: 64
DRG: 291 | End: 2019-05-03
Payer: MEDICARE

## 2019-05-03 LAB
ANION GAP SERPL CALCULATED.3IONS-SCNC: 10 MMOL/L (ref 4–13)
BASOPHILS # BLD AUTO: 0.05 THOUSANDS/ΜL (ref 0–0.1)
BASOPHILS NFR BLD AUTO: 1 % (ref 0–1)
BUN SERPL-MCNC: 32 MG/DL (ref 5–25)
CALCIUM SERPL-MCNC: 9.3 MG/DL (ref 8.3–10.1)
CHLORIDE SERPL-SCNC: 103 MMOL/L (ref 100–108)
CO2 SERPL-SCNC: 24 MMOL/L (ref 21–32)
CREAT SERPL-MCNC: 1.74 MG/DL (ref 0.6–1.3)
EOSINOPHIL # BLD AUTO: 0.22 THOUSAND/ΜL (ref 0–0.61)
EOSINOPHIL NFR BLD AUTO: 3 % (ref 0–6)
ERYTHROCYTE [DISTWIDTH] IN BLOOD BY AUTOMATED COUNT: 15.3 % (ref 11.6–15.1)
GFR SERPL CREATININE-BSD FRML MDRD: 40 ML/MIN/1.73SQ M
GLUCOSE SERPL-MCNC: 102 MG/DL (ref 65–140)
GLUCOSE SERPL-MCNC: 119 MG/DL (ref 65–140)
GLUCOSE SERPL-MCNC: 164 MG/DL (ref 65–140)
GLUCOSE SERPL-MCNC: 170 MG/DL (ref 65–140)
GLUCOSE SERPL-MCNC: 175 MG/DL (ref 65–140)
HCT VFR BLD AUTO: 29.5 % (ref 36.5–49.3)
HGB BLD-MCNC: 9.4 G/DL (ref 12–17)
IMM GRANULOCYTES # BLD AUTO: 0.03 THOUSAND/UL (ref 0–0.2)
IMM GRANULOCYTES NFR BLD AUTO: 0 % (ref 0–2)
LYMPHOCYTES # BLD AUTO: 0.58 THOUSANDS/ΜL (ref 0.6–4.47)
LYMPHOCYTES NFR BLD AUTO: 7 % (ref 14–44)
MCH RBC QN AUTO: 30.1 PG (ref 26.8–34.3)
MCHC RBC AUTO-ENTMCNC: 31.9 G/DL (ref 31.4–37.4)
MCV RBC AUTO: 95 FL (ref 82–98)
MONOCYTES # BLD AUTO: 0.78 THOUSAND/ΜL (ref 0.17–1.22)
MONOCYTES NFR BLD AUTO: 10 % (ref 4–12)
NEUTROPHILS # BLD AUTO: 6.52 THOUSANDS/ΜL (ref 1.85–7.62)
NEUTS SEG NFR BLD AUTO: 79 % (ref 43–75)
NRBC BLD AUTO-RTO: 0 /100 WBCS
PLATELET # BLD AUTO: 245 THOUSANDS/UL (ref 149–390)
PMV BLD AUTO: 9.3 FL (ref 8.9–12.7)
POTASSIUM SERPL-SCNC: 4.8 MMOL/L (ref 3.5–5.3)
PROCALCITONIN SERPL-MCNC: 0.62 NG/ML
RBC # BLD AUTO: 3.12 MILLION/UL (ref 3.88–5.62)
S PNEUM AG UR QL: NEGATIVE
SODIUM SERPL-SCNC: 137 MMOL/L (ref 136–145)
WBC # BLD AUTO: 8.18 THOUSAND/UL (ref 4.31–10.16)

## 2019-05-03 PROCEDURE — 85025 COMPLETE CBC W/AUTO DIFF WBC: CPT | Performed by: STUDENT IN AN ORGANIZED HEALTH CARE EDUCATION/TRAINING PROGRAM

## 2019-05-03 PROCEDURE — 99232 SBSQ HOSP IP/OBS MODERATE 35: CPT | Performed by: STUDENT IN AN ORGANIZED HEALTH CARE EDUCATION/TRAINING PROGRAM

## 2019-05-03 PROCEDURE — G8997 SWALLOW GOAL STATUS: HCPCS

## 2019-05-03 PROCEDURE — 87449 NOS EACH ORGANISM AG IA: CPT | Performed by: STUDENT IN AN ORGANIZED HEALTH CARE EDUCATION/TRAINING PROGRAM

## 2019-05-03 PROCEDURE — 80048 BASIC METABOLIC PNL TOTAL CA: CPT | Performed by: INTERNAL MEDICINE

## 2019-05-03 PROCEDURE — 84145 PROCALCITONIN (PCT): CPT | Performed by: STUDENT IN AN ORGANIZED HEALTH CARE EDUCATION/TRAINING PROGRAM

## 2019-05-03 PROCEDURE — 93280 PM DEVICE PROGR EVAL DUAL: CPT | Performed by: INTERNAL MEDICINE

## 2019-05-03 PROCEDURE — 87205 SMEAR GRAM STAIN: CPT | Performed by: STUDENT IN AN ORGANIZED HEALTH CARE EDUCATION/TRAINING PROGRAM

## 2019-05-03 PROCEDURE — 93306 TTE W/DOPPLER COMPLETE: CPT | Performed by: INTERNAL MEDICINE

## 2019-05-03 PROCEDURE — 92610 EVALUATE SWALLOWING FUNCTION: CPT

## 2019-05-03 PROCEDURE — 82948 REAGENT STRIP/BLOOD GLUCOSE: CPT

## 2019-05-03 PROCEDURE — 87070 CULTURE OTHR SPECIMN AEROBIC: CPT | Performed by: STUDENT IN AN ORGANIZED HEALTH CARE EDUCATION/TRAINING PROGRAM

## 2019-05-03 PROCEDURE — 71045 X-RAY EXAM CHEST 1 VIEW: CPT

## 2019-05-03 PROCEDURE — 94660 CPAP INITIATION&MGMT: CPT

## 2019-05-03 PROCEDURE — 94762 N-INVAS EAR/PLS OXIMTRY CONT: CPT

## 2019-05-03 PROCEDURE — 99232 SBSQ HOSP IP/OBS MODERATE 35: CPT | Performed by: INTERNAL MEDICINE

## 2019-05-03 PROCEDURE — G8996 SWALLOW CURRENT STATUS: HCPCS

## 2019-05-03 RX ORDER — GUAIFENESIN 600 MG
600 TABLET, EXTENDED RELEASE 12 HR ORAL EVERY 12 HOURS SCHEDULED
Status: DISCONTINUED | OUTPATIENT
Start: 2019-05-03 | End: 2019-05-07

## 2019-05-03 RX ADMIN — INSULIN LISPRO 1 UNITS: 100 INJECTION, SOLUTION INTRAVENOUS; SUBCUTANEOUS at 11:54

## 2019-05-03 RX ADMIN — METRONIDAZOLE 500 MG: 500 TABLET, FILM COATED ORAL at 05:22

## 2019-05-03 RX ADMIN — VITAMIN D, TAB 1000IU (100/BT) 1000 UNITS: 25 TAB at 08:03

## 2019-05-03 RX ADMIN — INSULIN LISPRO 1 UNITS: 100 INJECTION, SOLUTION INTRAVENOUS; SUBCUTANEOUS at 17:02

## 2019-05-03 RX ADMIN — INSULIN LISPRO 1 UNITS: 100 INJECTION, SOLUTION INTRAVENOUS; SUBCUTANEOUS at 21:17

## 2019-05-03 RX ADMIN — METRONIDAZOLE 500 MG: 500 TABLET, FILM COATED ORAL at 13:29

## 2019-05-03 RX ADMIN — METRONIDAZOLE 500 MG: 500 TABLET, FILM COATED ORAL at 21:17

## 2019-05-03 RX ADMIN — APIXABAN 5 MG: 5 TABLET, FILM COATED ORAL at 08:03

## 2019-05-03 RX ADMIN — AMLODIPINE BESYLATE 10 MG: 10 TABLET ORAL at 08:03

## 2019-05-03 RX ADMIN — TAMSULOSIN HYDROCHLORIDE 0.4 MG: 0.4 CAPSULE ORAL at 17:02

## 2019-05-03 RX ADMIN — CEFEPIME HYDROCHLORIDE 2000 MG: 2 INJECTION, POWDER, FOR SOLUTION INTRAVENOUS at 17:02

## 2019-05-03 RX ADMIN — GUAIFENESIN 600 MG: 600 TABLET, EXTENDED RELEASE ORAL at 08:47

## 2019-05-03 RX ADMIN — METOPROLOL TARTRATE 50 MG: 25 TABLET, FILM COATED ORAL at 20:11

## 2019-05-03 RX ADMIN — APIXABAN 5 MG: 5 TABLET, FILM COATED ORAL at 17:02

## 2019-05-03 RX ADMIN — CEFEPIME HYDROCHLORIDE 2000 MG: 2 INJECTION, POWDER, FOR SOLUTION INTRAVENOUS at 05:22

## 2019-05-03 RX ADMIN — METOPROLOL TARTRATE 50 MG: 25 TABLET, FILM COATED ORAL at 08:03

## 2019-05-03 RX ADMIN — ALLOPURINOL 300 MG: 100 TABLET ORAL at 08:03

## 2019-05-03 RX ADMIN — Medication 1000 MG: at 08:03

## 2019-05-03 RX ADMIN — ACETAMINOPHEN 650 MG: 325 TABLET ORAL at 17:02

## 2019-05-03 RX ADMIN — ACETAMINOPHEN 650 MG: 325 TABLET ORAL at 08:03

## 2019-05-03 RX ADMIN — PRAVASTATIN SODIUM 40 MG: 40 TABLET ORAL at 17:02

## 2019-05-03 RX ADMIN — GUAIFENESIN 600 MG: 600 TABLET, EXTENDED RELEASE ORAL at 20:11

## 2019-05-04 LAB
ANION GAP SERPL CALCULATED.3IONS-SCNC: 11 MMOL/L (ref 4–13)
BASOPHILS # BLD AUTO: 0.03 THOUSANDS/ΜL (ref 0–0.1)
BASOPHILS NFR BLD AUTO: 0 % (ref 0–1)
BUN SERPL-MCNC: 38 MG/DL (ref 5–25)
CALCIUM SERPL-MCNC: 9.3 MG/DL (ref 8.3–10.1)
CHLORIDE SERPL-SCNC: 103 MMOL/L (ref 100–108)
CO2 SERPL-SCNC: 22 MMOL/L (ref 21–32)
CREAT SERPL-MCNC: 1.64 MG/DL (ref 0.6–1.3)
EOSINOPHIL # BLD AUTO: 1.05 THOUSAND/ΜL (ref 0–0.61)
EOSINOPHIL NFR BLD AUTO: 12 % (ref 0–6)
ERYTHROCYTE [DISTWIDTH] IN BLOOD BY AUTOMATED COUNT: 15 % (ref 11.6–15.1)
GFR SERPL CREATININE-BSD FRML MDRD: 43 ML/MIN/1.73SQ M
GLUCOSE SERPL-MCNC: 103 MG/DL (ref 65–140)
GLUCOSE SERPL-MCNC: 159 MG/DL (ref 65–140)
GLUCOSE SERPL-MCNC: 165 MG/DL (ref 65–140)
GLUCOSE SERPL-MCNC: 201 MG/DL (ref 65–140)
GLUCOSE SERPL-MCNC: 91 MG/DL (ref 65–140)
HCT VFR BLD AUTO: 28.1 % (ref 36.5–49.3)
HGB BLD-MCNC: 9 G/DL (ref 12–17)
IMM GRANULOCYTES # BLD AUTO: 0.02 THOUSAND/UL (ref 0–0.2)
IMM GRANULOCYTES NFR BLD AUTO: 0 % (ref 0–2)
LYMPHOCYTES # BLD AUTO: 0.7 THOUSANDS/ΜL (ref 0.6–4.47)
LYMPHOCYTES NFR BLD AUTO: 8 % (ref 14–44)
MCH RBC QN AUTO: 30 PG (ref 26.8–34.3)
MCHC RBC AUTO-ENTMCNC: 32 G/DL (ref 31.4–37.4)
MCV RBC AUTO: 94 FL (ref 82–98)
MONOCYTES # BLD AUTO: 0.7 THOUSAND/ΜL (ref 0.17–1.22)
MONOCYTES NFR BLD AUTO: 8 % (ref 4–12)
NEUTROPHILS # BLD AUTO: 6.11 THOUSANDS/ΜL (ref 1.85–7.62)
NEUTS SEG NFR BLD AUTO: 72 % (ref 43–75)
NRBC BLD AUTO-RTO: 0 /100 WBCS
PLATELET # BLD AUTO: 235 THOUSANDS/UL (ref 149–390)
PMV BLD AUTO: 9 FL (ref 8.9–12.7)
POTASSIUM SERPL-SCNC: 4.4 MMOL/L (ref 3.5–5.3)
PROCALCITONIN SERPL-MCNC: 0.47 NG/ML
RBC # BLD AUTO: 3 MILLION/UL (ref 3.88–5.62)
SODIUM SERPL-SCNC: 136 MMOL/L (ref 136–145)
WBC # BLD AUTO: 8.61 THOUSAND/UL (ref 4.31–10.16)

## 2019-05-04 PROCEDURE — 84145 PROCALCITONIN (PCT): CPT | Performed by: STUDENT IN AN ORGANIZED HEALTH CARE EDUCATION/TRAINING PROGRAM

## 2019-05-04 PROCEDURE — 85025 COMPLETE CBC W/AUTO DIFF WBC: CPT | Performed by: STUDENT IN AN ORGANIZED HEALTH CARE EDUCATION/TRAINING PROGRAM

## 2019-05-04 PROCEDURE — 94762 N-INVAS EAR/PLS OXIMTRY CONT: CPT

## 2019-05-04 PROCEDURE — 82948 REAGENT STRIP/BLOOD GLUCOSE: CPT

## 2019-05-04 PROCEDURE — 94660 CPAP INITIATION&MGMT: CPT

## 2019-05-04 PROCEDURE — 99232 SBSQ HOSP IP/OBS MODERATE 35: CPT | Performed by: INTERNAL MEDICINE

## 2019-05-04 PROCEDURE — 80048 BASIC METABOLIC PNL TOTAL CA: CPT | Performed by: STUDENT IN AN ORGANIZED HEALTH CARE EDUCATION/TRAINING PROGRAM

## 2019-05-04 PROCEDURE — 99232 SBSQ HOSP IP/OBS MODERATE 35: CPT | Performed by: STUDENT IN AN ORGANIZED HEALTH CARE EDUCATION/TRAINING PROGRAM

## 2019-05-04 RX ORDER — FERROUS SULFATE 325(65) MG
325 TABLET ORAL 2 TIMES DAILY WITH MEALS
Status: DISCONTINUED | OUTPATIENT
Start: 2019-05-04 | End: 2019-05-08 | Stop reason: HOSPADM

## 2019-05-04 RX ORDER — FUROSEMIDE 40 MG/1
40 TABLET ORAL DAILY
Status: DISCONTINUED | OUTPATIENT
Start: 2019-05-04 | End: 2019-05-04

## 2019-05-04 RX ORDER — DOCUSATE SODIUM 100 MG/1
100 CAPSULE, LIQUID FILLED ORAL 2 TIMES DAILY PRN
Status: DISCONTINUED | OUTPATIENT
Start: 2019-05-04 | End: 2019-05-08 | Stop reason: HOSPADM

## 2019-05-04 RX ORDER — FUROSEMIDE 10 MG/ML
60 INJECTION INTRAMUSCULAR; INTRAVENOUS
Status: DISCONTINUED | OUTPATIENT
Start: 2019-05-04 | End: 2019-05-08 | Stop reason: HOSPADM

## 2019-05-04 RX ADMIN — FERROUS SULFATE TAB 325 MG (65 MG ELEMENTAL FE) 325 MG: 325 (65 FE) TAB at 08:40

## 2019-05-04 RX ADMIN — METOPROLOL TARTRATE 50 MG: 25 TABLET, FILM COATED ORAL at 08:41

## 2019-05-04 RX ADMIN — FERROUS SULFATE TAB 325 MG (65 MG ELEMENTAL FE) 325 MG: 325 (65 FE) TAB at 17:37

## 2019-05-04 RX ADMIN — PRAVASTATIN SODIUM 40 MG: 40 TABLET ORAL at 17:37

## 2019-05-04 RX ADMIN — FUROSEMIDE 60 MG: 10 INJECTION, SOLUTION INTRAMUSCULAR; INTRAVENOUS at 11:55

## 2019-05-04 RX ADMIN — INSULIN LISPRO 1 UNITS: 100 INJECTION, SOLUTION INTRAVENOUS; SUBCUTANEOUS at 17:43

## 2019-05-04 RX ADMIN — METOPROLOL TARTRATE 50 MG: 25 TABLET, FILM COATED ORAL at 21:03

## 2019-05-04 RX ADMIN — GUAIFENESIN 600 MG: 600 TABLET, EXTENDED RELEASE ORAL at 08:40

## 2019-05-04 RX ADMIN — CEFEPIME HYDROCHLORIDE 2000 MG: 2 INJECTION, POWDER, FOR SOLUTION INTRAVENOUS at 17:31

## 2019-05-04 RX ADMIN — AMLODIPINE BESYLATE 10 MG: 10 TABLET ORAL at 08:41

## 2019-05-04 RX ADMIN — METRONIDAZOLE 500 MG: 500 TABLET, FILM COATED ORAL at 05:40

## 2019-05-04 RX ADMIN — METRONIDAZOLE 500 MG: 500 TABLET, FILM COATED ORAL at 21:04

## 2019-05-04 RX ADMIN — ALLOPURINOL 300 MG: 100 TABLET ORAL at 08:40

## 2019-05-04 RX ADMIN — INSULIN LISPRO 1 UNITS: 100 INJECTION, SOLUTION INTRAVENOUS; SUBCUTANEOUS at 21:03

## 2019-05-04 RX ADMIN — FUROSEMIDE 60 MG: 10 INJECTION, SOLUTION INTRAMUSCULAR; INTRAVENOUS at 17:30

## 2019-05-04 RX ADMIN — VITAMIN D, TAB 1000IU (100/BT) 1000 UNITS: 25 TAB at 08:40

## 2019-05-04 RX ADMIN — GUAIFENESIN 600 MG: 600 TABLET, EXTENDED RELEASE ORAL at 21:04

## 2019-05-04 RX ADMIN — TAMSULOSIN HYDROCHLORIDE 0.4 MG: 0.4 CAPSULE ORAL at 17:37

## 2019-05-04 RX ADMIN — APIXABAN 5 MG: 5 TABLET, FILM COATED ORAL at 17:38

## 2019-05-04 RX ADMIN — FUROSEMIDE 40 MG: 40 TABLET ORAL at 08:40

## 2019-05-04 RX ADMIN — APIXABAN 5 MG: 5 TABLET, FILM COATED ORAL at 08:42

## 2019-05-04 RX ADMIN — INSULIN LISPRO 2 UNITS: 100 INJECTION, SOLUTION INTRAVENOUS; SUBCUTANEOUS at 11:53

## 2019-05-04 RX ADMIN — METRONIDAZOLE 500 MG: 500 TABLET, FILM COATED ORAL at 14:44

## 2019-05-04 RX ADMIN — CEFEPIME HYDROCHLORIDE 2000 MG: 2 INJECTION, POWDER, FOR SOLUTION INTRAVENOUS at 05:40

## 2019-05-04 RX ADMIN — Medication 1000 MG: at 08:40

## 2019-05-05 LAB
ANION GAP SERPL CALCULATED.3IONS-SCNC: 10 MMOL/L (ref 4–13)
BACTERIA SPT RESP CULT: ABNORMAL
BACTERIA SPT RESP CULT: ABNORMAL
BUN SERPL-MCNC: 42 MG/DL (ref 5–25)
CALCIUM SERPL-MCNC: 8.9 MG/DL (ref 8.3–10.1)
CHLORIDE SERPL-SCNC: 103 MMOL/L (ref 100–108)
CO2 SERPL-SCNC: 23 MMOL/L (ref 21–32)
CREAT SERPL-MCNC: 1.54 MG/DL (ref 0.6–1.3)
ERYTHROCYTE [DISTWIDTH] IN BLOOD BY AUTOMATED COUNT: 14.8 % (ref 11.6–15.1)
GFR SERPL CREATININE-BSD FRML MDRD: 47 ML/MIN/1.73SQ M
GLUCOSE SERPL-MCNC: 116 MG/DL (ref 65–140)
GLUCOSE SERPL-MCNC: 120 MG/DL (ref 65–140)
GLUCOSE SERPL-MCNC: 177 MG/DL (ref 65–140)
GLUCOSE SERPL-MCNC: 192 MG/DL (ref 65–140)
GLUCOSE SERPL-MCNC: 237 MG/DL (ref 65–140)
GRAM STN SPEC: ABNORMAL
HCT VFR BLD AUTO: 27.1 % (ref 36.5–49.3)
HGB BLD-MCNC: 8.8 G/DL (ref 12–17)
MCH RBC QN AUTO: 29.8 PG (ref 26.8–34.3)
MCHC RBC AUTO-ENTMCNC: 32.5 G/DL (ref 31.4–37.4)
MCV RBC AUTO: 92 FL (ref 82–98)
PLATELET # BLD AUTO: 231 THOUSANDS/UL (ref 149–390)
PMV BLD AUTO: 8.9 FL (ref 8.9–12.7)
POTASSIUM SERPL-SCNC: 4.3 MMOL/L (ref 3.5–5.3)
RBC # BLD AUTO: 2.95 MILLION/UL (ref 3.88–5.62)
SODIUM SERPL-SCNC: 136 MMOL/L (ref 136–145)
WBC # BLD AUTO: 8.54 THOUSAND/UL (ref 4.31–10.16)

## 2019-05-05 PROCEDURE — 82948 REAGENT STRIP/BLOOD GLUCOSE: CPT

## 2019-05-05 PROCEDURE — 94762 N-INVAS EAR/PLS OXIMTRY CONT: CPT

## 2019-05-05 PROCEDURE — 85027 COMPLETE CBC AUTOMATED: CPT | Performed by: STUDENT IN AN ORGANIZED HEALTH CARE EDUCATION/TRAINING PROGRAM

## 2019-05-05 PROCEDURE — 80048 BASIC METABOLIC PNL TOTAL CA: CPT | Performed by: STUDENT IN AN ORGANIZED HEALTH CARE EDUCATION/TRAINING PROGRAM

## 2019-05-05 PROCEDURE — 94660 CPAP INITIATION&MGMT: CPT

## 2019-05-05 PROCEDURE — 99232 SBSQ HOSP IP/OBS MODERATE 35: CPT | Performed by: INTERNAL MEDICINE

## 2019-05-05 PROCEDURE — 99232 SBSQ HOSP IP/OBS MODERATE 35: CPT | Performed by: STUDENT IN AN ORGANIZED HEALTH CARE EDUCATION/TRAINING PROGRAM

## 2019-05-05 RX ADMIN — INSULIN LISPRO 1 UNITS: 100 INJECTION, SOLUTION INTRAVENOUS; SUBCUTANEOUS at 21:12

## 2019-05-05 RX ADMIN — METRONIDAZOLE 500 MG: 500 TABLET, FILM COATED ORAL at 21:12

## 2019-05-05 RX ADMIN — VITAMIN D, TAB 1000IU (100/BT) 1000 UNITS: 25 TAB at 09:15

## 2019-05-05 RX ADMIN — CEFEPIME HYDROCHLORIDE 2000 MG: 2 INJECTION, POWDER, FOR SOLUTION INTRAVENOUS at 18:30

## 2019-05-05 RX ADMIN — FERROUS SULFATE TAB 325 MG (65 MG ELEMENTAL FE) 325 MG: 325 (65 FE) TAB at 16:59

## 2019-05-05 RX ADMIN — FERROUS SULFATE TAB 325 MG (65 MG ELEMENTAL FE) 325 MG: 325 (65 FE) TAB at 09:15

## 2019-05-05 RX ADMIN — FUROSEMIDE 60 MG: 10 INJECTION, SOLUTION INTRAMUSCULAR; INTRAVENOUS at 16:24

## 2019-05-05 RX ADMIN — METRONIDAZOLE 500 MG: 500 TABLET, FILM COATED ORAL at 05:07

## 2019-05-05 RX ADMIN — CEFEPIME HYDROCHLORIDE 2000 MG: 2 INJECTION, POWDER, FOR SOLUTION INTRAVENOUS at 05:07

## 2019-05-05 RX ADMIN — INSULIN LISPRO 1 UNITS: 100 INJECTION, SOLUTION INTRAVENOUS; SUBCUTANEOUS at 17:00

## 2019-05-05 RX ADMIN — APIXABAN 5 MG: 5 TABLET, FILM COATED ORAL at 09:15

## 2019-05-05 RX ADMIN — GUAIFENESIN 600 MG: 600 TABLET, EXTENDED RELEASE ORAL at 09:14

## 2019-05-05 RX ADMIN — METRONIDAZOLE 500 MG: 500 TABLET, FILM COATED ORAL at 15:00

## 2019-05-05 RX ADMIN — PRAVASTATIN SODIUM 40 MG: 40 TABLET ORAL at 16:59

## 2019-05-05 RX ADMIN — INSULIN LISPRO 2 UNITS: 100 INJECTION, SOLUTION INTRAVENOUS; SUBCUTANEOUS at 12:30

## 2019-05-05 RX ADMIN — GUAIFENESIN 600 MG: 600 TABLET, EXTENDED RELEASE ORAL at 21:12

## 2019-05-05 RX ADMIN — TAMSULOSIN HYDROCHLORIDE 0.4 MG: 0.4 CAPSULE ORAL at 16:24

## 2019-05-05 RX ADMIN — APIXABAN 5 MG: 5 TABLET, FILM COATED ORAL at 17:00

## 2019-05-05 RX ADMIN — METOPROLOL TARTRATE 50 MG: 25 TABLET, FILM COATED ORAL at 21:12

## 2019-05-05 RX ADMIN — METOPROLOL TARTRATE 50 MG: 25 TABLET, FILM COATED ORAL at 09:15

## 2019-05-05 RX ADMIN — FUROSEMIDE 60 MG: 10 INJECTION, SOLUTION INTRAMUSCULAR; INTRAVENOUS at 09:00

## 2019-05-05 RX ADMIN — ALLOPURINOL 300 MG: 100 TABLET ORAL at 09:14

## 2019-05-05 RX ADMIN — Medication 1000 MG: at 09:14

## 2019-05-05 RX ADMIN — AMLODIPINE BESYLATE 10 MG: 10 TABLET ORAL at 09:15

## 2019-05-06 LAB
ANION GAP SERPL CALCULATED.3IONS-SCNC: 10 MMOL/L (ref 4–13)
BUN SERPL-MCNC: 43 MG/DL (ref 5–25)
CALCIUM SERPL-MCNC: 9.2 MG/DL (ref 8.3–10.1)
CHLORIDE SERPL-SCNC: 104 MMOL/L (ref 100–108)
CO2 SERPL-SCNC: 23 MMOL/L (ref 21–32)
CREAT SERPL-MCNC: 1.47 MG/DL (ref 0.6–1.3)
ERYTHROCYTE [DISTWIDTH] IN BLOOD BY AUTOMATED COUNT: 14.7 % (ref 11.6–15.1)
GFR SERPL CREATININE-BSD FRML MDRD: 49 ML/MIN/1.73SQ M
GLUCOSE SERPL-MCNC: 120 MG/DL (ref 65–140)
GLUCOSE SERPL-MCNC: 125 MG/DL (ref 65–140)
GLUCOSE SERPL-MCNC: 164 MG/DL (ref 65–140)
GLUCOSE SERPL-MCNC: 211 MG/DL (ref 65–140)
GLUCOSE SERPL-MCNC: 233 MG/DL (ref 65–140)
HCT VFR BLD AUTO: 27.6 % (ref 36.5–49.3)
HGB BLD-MCNC: 9.1 G/DL (ref 12–17)
MAGNESIUM SERPL-MCNC: 2.1 MG/DL (ref 1.6–2.6)
MCH RBC QN AUTO: 30.3 PG (ref 26.8–34.3)
MCHC RBC AUTO-ENTMCNC: 33 G/DL (ref 31.4–37.4)
MCV RBC AUTO: 92 FL (ref 82–98)
PLATELET # BLD AUTO: 261 THOUSANDS/UL (ref 149–390)
PMV BLD AUTO: 9.4 FL (ref 8.9–12.7)
POTASSIUM SERPL-SCNC: 4.1 MMOL/L (ref 3.5–5.3)
RBC # BLD AUTO: 3 MILLION/UL (ref 3.88–5.62)
SODIUM SERPL-SCNC: 137 MMOL/L (ref 136–145)
WBC # BLD AUTO: 8.47 THOUSAND/UL (ref 4.31–10.16)

## 2019-05-06 PROCEDURE — 83735 ASSAY OF MAGNESIUM: CPT | Performed by: STUDENT IN AN ORGANIZED HEALTH CARE EDUCATION/TRAINING PROGRAM

## 2019-05-06 PROCEDURE — 99232 SBSQ HOSP IP/OBS MODERATE 35: CPT | Performed by: INTERNAL MEDICINE

## 2019-05-06 PROCEDURE — 85027 COMPLETE CBC AUTOMATED: CPT | Performed by: STUDENT IN AN ORGANIZED HEALTH CARE EDUCATION/TRAINING PROGRAM

## 2019-05-06 PROCEDURE — 94660 CPAP INITIATION&MGMT: CPT

## 2019-05-06 PROCEDURE — 82948 REAGENT STRIP/BLOOD GLUCOSE: CPT

## 2019-05-06 PROCEDURE — 80048 BASIC METABOLIC PNL TOTAL CA: CPT | Performed by: STUDENT IN AN ORGANIZED HEALTH CARE EDUCATION/TRAINING PROGRAM

## 2019-05-06 RX ORDER — AZITHROMYCIN 250 MG/1
250 TABLET, FILM COATED ORAL EVERY 24 HOURS
Status: DISCONTINUED | OUTPATIENT
Start: 2019-05-06 | End: 2019-05-08 | Stop reason: HOSPADM

## 2019-05-06 RX ORDER — METOLAZONE 2.5 MG/1
2.5 TABLET ORAL ONCE
Status: COMPLETED | OUTPATIENT
Start: 2019-05-06 | End: 2019-05-06

## 2019-05-06 RX ADMIN — GUAIFENESIN 600 MG: 600 TABLET, EXTENDED RELEASE ORAL at 21:18

## 2019-05-06 RX ADMIN — CEFEPIME HYDROCHLORIDE 2000 MG: 2 INJECTION, POWDER, FOR SOLUTION INTRAVENOUS at 05:08

## 2019-05-06 RX ADMIN — APIXABAN 5 MG: 5 TABLET, FILM COATED ORAL at 08:25

## 2019-05-06 RX ADMIN — CEFTRIAXONE 1000 MG: 1 INJECTION, POWDER, FOR SOLUTION INTRAMUSCULAR; INTRAVENOUS at 18:24

## 2019-05-06 RX ADMIN — INSULIN LISPRO 3 UNITS: 100 INJECTION, SOLUTION INTRAVENOUS; SUBCUTANEOUS at 12:17

## 2019-05-06 RX ADMIN — INSULIN LISPRO 2 UNITS: 100 INJECTION, SOLUTION INTRAVENOUS; SUBCUTANEOUS at 17:15

## 2019-05-06 RX ADMIN — FERROUS SULFATE TAB 325 MG (65 MG ELEMENTAL FE) 325 MG: 325 (65 FE) TAB at 17:15

## 2019-05-06 RX ADMIN — METOPROLOL TARTRATE 50 MG: 25 TABLET, FILM COATED ORAL at 21:18

## 2019-05-06 RX ADMIN — APIXABAN 5 MG: 5 TABLET, FILM COATED ORAL at 17:15

## 2019-05-06 RX ADMIN — FERROUS SULFATE TAB 325 MG (65 MG ELEMENTAL FE) 325 MG: 325 (65 FE) TAB at 08:25

## 2019-05-06 RX ADMIN — METRONIDAZOLE 500 MG: 500 TABLET, FILM COATED ORAL at 05:06

## 2019-05-06 RX ADMIN — SENNOSIDES 8.6 MG: 8.6 TABLET, FILM COATED ORAL at 08:25

## 2019-05-06 RX ADMIN — METRONIDAZOLE 500 MG: 500 TABLET, FILM COATED ORAL at 13:24

## 2019-05-06 RX ADMIN — METOLAZONE 2.5 MG: 2.5 TABLET ORAL at 14:39

## 2019-05-06 RX ADMIN — AMLODIPINE BESYLATE 10 MG: 10 TABLET ORAL at 08:26

## 2019-05-06 RX ADMIN — INSULIN LISPRO 1 UNITS: 100 INJECTION, SOLUTION INTRAVENOUS; SUBCUTANEOUS at 21:19

## 2019-05-06 RX ADMIN — AZITHROMYCIN 250 MG: 250 TABLET, FILM COATED ORAL at 17:15

## 2019-05-06 RX ADMIN — METOPROLOL TARTRATE 50 MG: 25 TABLET, FILM COATED ORAL at 08:25

## 2019-05-06 RX ADMIN — VITAMIN D, TAB 1000IU (100/BT) 1000 UNITS: 25 TAB at 08:26

## 2019-05-06 RX ADMIN — FUROSEMIDE 60 MG: 10 INJECTION, SOLUTION INTRAMUSCULAR; INTRAVENOUS at 17:22

## 2019-05-06 RX ADMIN — GUAIFENESIN 600 MG: 600 TABLET, EXTENDED RELEASE ORAL at 08:26

## 2019-05-06 RX ADMIN — PRAVASTATIN SODIUM 40 MG: 40 TABLET ORAL at 17:15

## 2019-05-06 RX ADMIN — Medication 1000 MG: at 08:25

## 2019-05-06 RX ADMIN — BETAMETHASONE VALERATE: 1 CREAM TOPICAL at 18:24

## 2019-05-06 RX ADMIN — TAMSULOSIN HYDROCHLORIDE 0.4 MG: 0.4 CAPSULE ORAL at 17:15

## 2019-05-06 RX ADMIN — ALLOPURINOL 300 MG: 100 TABLET ORAL at 08:25

## 2019-05-06 RX ADMIN — FUROSEMIDE 60 MG: 10 INJECTION, SOLUTION INTRAMUSCULAR; INTRAVENOUS at 08:22

## 2019-05-07 ENCOUNTER — TELEPHONE (OUTPATIENT)
Dept: NEPHROLOGY | Facility: CLINIC | Age: 64
End: 2019-05-07

## 2019-05-07 LAB
ANION GAP SERPL CALCULATED.3IONS-SCNC: 9 MMOL/L (ref 4–13)
BACTERIA BLD CULT: NORMAL
BACTERIA BLD CULT: NORMAL
BUN SERPL-MCNC: 39 MG/DL (ref 5–25)
CALCIUM SERPL-MCNC: 9.7 MG/DL (ref 8.3–10.1)
CHLORIDE SERPL-SCNC: 105 MMOL/L (ref 100–108)
CO2 SERPL-SCNC: 25 MMOL/L (ref 21–32)
CREAT SERPL-MCNC: 1.33 MG/DL (ref 0.6–1.3)
GFR SERPL CREATININE-BSD FRML MDRD: 56 ML/MIN/1.73SQ M
GLUCOSE SERPL-MCNC: 108 MG/DL (ref 65–140)
GLUCOSE SERPL-MCNC: 152 MG/DL (ref 65–140)
GLUCOSE SERPL-MCNC: 153 MG/DL (ref 65–140)
GLUCOSE SERPL-MCNC: 181 MG/DL (ref 65–140)
GLUCOSE SERPL-MCNC: 271 MG/DL (ref 65–140)
NT-PROBNP SERPL-MCNC: 1290 PG/ML
POTASSIUM SERPL-SCNC: 3.7 MMOL/L (ref 3.5–5.3)
PROCALCITONIN SERPL-MCNC: 0.11 NG/ML
SODIUM SERPL-SCNC: 139 MMOL/L (ref 136–145)

## 2019-05-07 PROCEDURE — 80048 BASIC METABOLIC PNL TOTAL CA: CPT | Performed by: INTERNAL MEDICINE

## 2019-05-07 PROCEDURE — 94660 CPAP INITIATION&MGMT: CPT

## 2019-05-07 PROCEDURE — 82948 REAGENT STRIP/BLOOD GLUCOSE: CPT

## 2019-05-07 PROCEDURE — 84145 PROCALCITONIN (PCT): CPT | Performed by: INTERNAL MEDICINE

## 2019-05-07 PROCEDURE — 99232 SBSQ HOSP IP/OBS MODERATE 35: CPT | Performed by: INTERNAL MEDICINE

## 2019-05-07 PROCEDURE — 83880 ASSAY OF NATRIURETIC PEPTIDE: CPT | Performed by: INTERNAL MEDICINE

## 2019-05-07 RX ORDER — METOLAZONE 2.5 MG/1
2.5 TABLET ORAL ONCE
Status: COMPLETED | OUTPATIENT
Start: 2019-05-07 | End: 2019-05-07

## 2019-05-07 RX ORDER — GUAIFENESIN/DEXTROMETHORPHAN 100-10MG/5
10 SYRUP ORAL EVERY 4 HOURS PRN
Status: DISCONTINUED | OUTPATIENT
Start: 2019-05-07 | End: 2019-05-08 | Stop reason: HOSPADM

## 2019-05-07 RX ADMIN — AZITHROMYCIN 250 MG: 250 TABLET, FILM COATED ORAL at 17:21

## 2019-05-07 RX ADMIN — GUAIFENESIN 600 MG: 600 TABLET, EXTENDED RELEASE ORAL at 08:43

## 2019-05-07 RX ADMIN — Medication 1000 MG: at 08:43

## 2019-05-07 RX ADMIN — METOLAZONE 2.5 MG: 2.5 TABLET ORAL at 15:34

## 2019-05-07 RX ADMIN — ACETAMINOPHEN 650 MG: 325 TABLET ORAL at 21:30

## 2019-05-07 RX ADMIN — INSULIN LISPRO 1 UNITS: 100 INJECTION, SOLUTION INTRAVENOUS; SUBCUTANEOUS at 08:44

## 2019-05-07 RX ADMIN — FERROUS SULFATE TAB 325 MG (65 MG ELEMENTAL FE) 325 MG: 325 (65 FE) TAB at 08:44

## 2019-05-07 RX ADMIN — TAMSULOSIN HYDROCHLORIDE 0.4 MG: 0.4 CAPSULE ORAL at 17:22

## 2019-05-07 RX ADMIN — BETAMETHASONE VALERATE: 1 CREAM TOPICAL at 08:44

## 2019-05-07 RX ADMIN — SENNOSIDES 8.6 MG: 8.6 TABLET, FILM COATED ORAL at 08:44

## 2019-05-07 RX ADMIN — INSULIN LISPRO 1 UNITS: 100 INJECTION, SOLUTION INTRAVENOUS; SUBCUTANEOUS at 21:33

## 2019-05-07 RX ADMIN — METOPROLOL TARTRATE 50 MG: 25 TABLET, FILM COATED ORAL at 08:43

## 2019-05-07 RX ADMIN — FUROSEMIDE 60 MG: 10 INJECTION, SOLUTION INTRAMUSCULAR; INTRAVENOUS at 17:21

## 2019-05-07 RX ADMIN — CEFTRIAXONE 1000 MG: 1 INJECTION, POWDER, FOR SOLUTION INTRAMUSCULAR; INTRAVENOUS at 17:21

## 2019-05-07 RX ADMIN — FERROUS SULFATE TAB 325 MG (65 MG ELEMENTAL FE) 325 MG: 325 (65 FE) TAB at 17:21

## 2019-05-07 RX ADMIN — AMLODIPINE BESYLATE 10 MG: 10 TABLET ORAL at 08:43

## 2019-05-07 RX ADMIN — APIXABAN 5 MG: 5 TABLET, FILM COATED ORAL at 17:21

## 2019-05-07 RX ADMIN — BETAMETHASONE VALERATE: 1 CREAM TOPICAL at 17:28

## 2019-05-07 RX ADMIN — FUROSEMIDE 60 MG: 10 INJECTION, SOLUTION INTRAMUSCULAR; INTRAVENOUS at 08:43

## 2019-05-07 RX ADMIN — APIXABAN 5 MG: 5 TABLET, FILM COATED ORAL at 08:43

## 2019-05-07 RX ADMIN — ALLOPURINOL 300 MG: 100 TABLET ORAL at 08:43

## 2019-05-07 RX ADMIN — VITAMIN D, TAB 1000IU (100/BT) 1000 UNITS: 25 TAB at 08:43

## 2019-05-07 RX ADMIN — PRAVASTATIN SODIUM 40 MG: 40 TABLET ORAL at 17:22

## 2019-05-07 RX ADMIN — METOPROLOL TARTRATE 50 MG: 25 TABLET, FILM COATED ORAL at 21:34

## 2019-05-08 VITALS
WEIGHT: 209 LBS | HEIGHT: 70 IN | RESPIRATION RATE: 18 BRPM | BODY MASS INDEX: 29.92 KG/M2 | OXYGEN SATURATION: 96 % | TEMPERATURE: 98.3 F | HEART RATE: 60 BPM | SYSTOLIC BLOOD PRESSURE: 149 MMHG | DIASTOLIC BLOOD PRESSURE: 70 MMHG

## 2019-05-08 LAB
ANION GAP SERPL CALCULATED.3IONS-SCNC: 11 MMOL/L (ref 4–13)
BASOPHILS # BLD AUTO: 0.05 THOUSANDS/ΜL (ref 0–0.1)
BASOPHILS NFR BLD AUTO: 1 % (ref 0–1)
BUN SERPL-MCNC: 38 MG/DL (ref 5–25)
CALCIUM SERPL-MCNC: 9.5 MG/DL (ref 8.3–10.1)
CHLORIDE SERPL-SCNC: 103 MMOL/L (ref 100–108)
CO2 SERPL-SCNC: 24 MMOL/L (ref 21–32)
CREAT SERPL-MCNC: 1.23 MG/DL (ref 0.6–1.3)
EOSINOPHIL # BLD AUTO: 1.01 THOUSAND/ΜL (ref 0–0.61)
EOSINOPHIL NFR BLD AUTO: 12 % (ref 0–6)
ERYTHROCYTE [DISTWIDTH] IN BLOOD BY AUTOMATED COUNT: 14.7 % (ref 11.6–15.1)
GFR SERPL CREATININE-BSD FRML MDRD: 61 ML/MIN/1.73SQ M
GLUCOSE SERPL-MCNC: 119 MG/DL (ref 65–140)
GLUCOSE SERPL-MCNC: 184 MG/DL (ref 65–140)
GLUCOSE SERPL-MCNC: 251 MG/DL (ref 65–140)
HCT VFR BLD AUTO: 30 % (ref 36.5–49.3)
HGB BLD-MCNC: 9.7 G/DL (ref 12–17)
IMM GRANULOCYTES # BLD AUTO: 0.04 THOUSAND/UL (ref 0–0.2)
IMM GRANULOCYTES NFR BLD AUTO: 1 % (ref 0–2)
LYMPHOCYTES # BLD AUTO: 1.63 THOUSANDS/ΜL (ref 0.6–4.47)
LYMPHOCYTES NFR BLD AUTO: 19 % (ref 14–44)
MAGNESIUM SERPL-MCNC: 1.9 MG/DL (ref 1.6–2.6)
MCH RBC QN AUTO: 29.4 PG (ref 26.8–34.3)
MCHC RBC AUTO-ENTMCNC: 32.3 G/DL (ref 31.4–37.4)
MCV RBC AUTO: 91 FL (ref 82–98)
MONOCYTES # BLD AUTO: 0.65 THOUSAND/ΜL (ref 0.17–1.22)
MONOCYTES NFR BLD AUTO: 8 % (ref 4–12)
NEUTROPHILS # BLD AUTO: 5.11 THOUSANDS/ΜL (ref 1.85–7.62)
NEUTS SEG NFR BLD AUTO: 59 % (ref 43–75)
NRBC BLD AUTO-RTO: 0 /100 WBCS
PHOSPHATE SERPL-MCNC: 4.4 MG/DL (ref 2.3–4.1)
PLATELET # BLD AUTO: 287 THOUSANDS/UL (ref 149–390)
PMV BLD AUTO: 9.8 FL (ref 8.9–12.7)
POTASSIUM SERPL-SCNC: 3.4 MMOL/L (ref 3.5–5.3)
PROCALCITONIN SERPL-MCNC: 0.08 NG/ML
RBC # BLD AUTO: 3.3 MILLION/UL (ref 3.88–5.62)
SODIUM SERPL-SCNC: 138 MMOL/L (ref 136–145)
WBC # BLD AUTO: 8.49 THOUSAND/UL (ref 4.31–10.16)

## 2019-05-08 PROCEDURE — 97163 PT EVAL HIGH COMPLEX 45 MIN: CPT

## 2019-05-08 PROCEDURE — 84100 ASSAY OF PHOSPHORUS: CPT | Performed by: INTERNAL MEDICINE

## 2019-05-08 PROCEDURE — 94761 N-INVAS EAR/PLS OXIMETRY MLT: CPT

## 2019-05-08 PROCEDURE — 94660 CPAP INITIATION&MGMT: CPT

## 2019-05-08 PROCEDURE — G8978 MOBILITY CURRENT STATUS: HCPCS

## 2019-05-08 PROCEDURE — 83735 ASSAY OF MAGNESIUM: CPT | Performed by: INTERNAL MEDICINE

## 2019-05-08 PROCEDURE — 85025 COMPLETE CBC W/AUTO DIFF WBC: CPT | Performed by: INTERNAL MEDICINE

## 2019-05-08 PROCEDURE — 82948 REAGENT STRIP/BLOOD GLUCOSE: CPT

## 2019-05-08 PROCEDURE — 99239 HOSP IP/OBS DSCHRG MGMT >30: CPT | Performed by: INTERNAL MEDICINE

## 2019-05-08 PROCEDURE — 84145 PROCALCITONIN (PCT): CPT | Performed by: INTERNAL MEDICINE

## 2019-05-08 PROCEDURE — 97166 OT EVAL MOD COMPLEX 45 MIN: CPT

## 2019-05-08 PROCEDURE — 99232 SBSQ HOSP IP/OBS MODERATE 35: CPT | Performed by: INTERNAL MEDICINE

## 2019-05-08 PROCEDURE — 80048 BASIC METABOLIC PNL TOTAL CA: CPT | Performed by: INTERNAL MEDICINE

## 2019-05-08 PROCEDURE — G8988 SELF CARE GOAL STATUS: HCPCS

## 2019-05-08 PROCEDURE — G8987 SELF CARE CURRENT STATUS: HCPCS

## 2019-05-08 PROCEDURE — G8989 SELF CARE D/C STATUS: HCPCS

## 2019-05-08 PROCEDURE — G8979 MOBILITY GOAL STATUS: HCPCS

## 2019-05-08 RX ORDER — CEFUROXIME AXETIL 500 MG/1
500 TABLET ORAL EVERY 12 HOURS SCHEDULED
Qty: 6 TABLET | Refills: 0 | Status: SHIPPED | OUTPATIENT
Start: 2019-05-08 | End: 2019-05-11

## 2019-05-08 RX ORDER — AZITHROMYCIN 250 MG/1
250 TABLET, FILM COATED ORAL EVERY 24 HOURS
Qty: 3 TABLET | Refills: 0 | Status: SHIPPED | OUTPATIENT
Start: 2019-05-08 | End: 2019-05-11

## 2019-05-08 RX ORDER — POTASSIUM CHLORIDE 1500 MG/1
20 TABLET, FILM COATED, EXTENDED RELEASE ORAL DAILY
Qty: 30 TABLET | Refills: 0 | Status: SHIPPED | OUTPATIENT
Start: 2019-05-08 | End: 2019-06-05 | Stop reason: SDUPTHER

## 2019-05-08 RX ORDER — FUROSEMIDE 40 MG/1
40 TABLET ORAL 2 TIMES DAILY
Status: ON HOLD
Start: 2019-05-08 | End: 2019-05-23 | Stop reason: SDUPTHER

## 2019-05-08 RX ADMIN — METOPROLOL TARTRATE 50 MG: 25 TABLET, FILM COATED ORAL at 09:18

## 2019-05-08 RX ADMIN — FUROSEMIDE 60 MG: 10 INJECTION, SOLUTION INTRAMUSCULAR; INTRAVENOUS at 09:25

## 2019-05-08 RX ADMIN — APIXABAN 5 MG: 5 TABLET, FILM COATED ORAL at 09:18

## 2019-05-08 RX ADMIN — AMLODIPINE BESYLATE 10 MG: 10 TABLET ORAL at 09:18

## 2019-05-08 RX ADMIN — FERROUS SULFATE TAB 325 MG (65 MG ELEMENTAL FE) 325 MG: 325 (65 FE) TAB at 09:18

## 2019-05-08 RX ADMIN — Medication 1000 MG: at 09:18

## 2019-05-08 RX ADMIN — VITAMIN D, TAB 1000IU (100/BT) 1000 UNITS: 25 TAB at 09:18

## 2019-05-08 RX ADMIN — ALLOPURINOL 300 MG: 100 TABLET ORAL at 09:18

## 2019-05-08 RX ADMIN — BETAMETHASONE VALERATE: 1 CREAM TOPICAL at 09:04

## 2019-05-09 ENCOUNTER — PATIENT OUTREACH (OUTPATIENT)
Dept: FAMILY MEDICINE CLINIC | Facility: CLINIC | Age: 64
End: 2019-05-09

## 2019-05-09 ENCOUNTER — TRANSITIONAL CARE MANAGEMENT (OUTPATIENT)
Dept: FAMILY MEDICINE CLINIC | Facility: CLINIC | Age: 64
End: 2019-05-09

## 2019-05-09 ENCOUNTER — EPISODE CHANGES (OUTPATIENT)
Dept: CASE MANAGEMENT | Facility: HOSPITAL | Age: 64
End: 2019-05-09

## 2019-05-15 ENCOUNTER — HOSPITAL ENCOUNTER (INPATIENT)
Facility: HOSPITAL | Age: 64
LOS: 8 days | Discharge: HOME | DRG: 286 | End: 2019-05-23
Attending: EMERGENCY MEDICINE | Admitting: INTERNAL MEDICINE
Payer: MEDICARE

## 2019-05-15 ENCOUNTER — APPOINTMENT (EMERGENCY)
Dept: CT IMAGING | Facility: HOSPITAL | Age: 64
DRG: 286 | End: 2019-05-15
Payer: MEDICARE

## 2019-05-15 DIAGNOSIS — I50.32 CHRONIC DIASTOLIC (CONGESTIVE) HEART FAILURE (HCC): ICD-10-CM

## 2019-05-15 DIAGNOSIS — I48.0 PAROXYSMAL ATRIAL FIBRILLATION (HCC): ICD-10-CM

## 2019-05-15 DIAGNOSIS — I50.9 CHF (CONGESTIVE HEART FAILURE) (HCC): ICD-10-CM

## 2019-05-15 DIAGNOSIS — R06.03 ACUTE RESPIRATORY DISTRESS: Primary | ICD-10-CM

## 2019-05-15 DIAGNOSIS — J90 BILATERAL PLEURAL EFFUSION: ICD-10-CM

## 2019-05-15 DIAGNOSIS — I50.31 ACUTE DIASTOLIC (CONGESTIVE) HEART FAILURE (HCC): ICD-10-CM

## 2019-05-15 DIAGNOSIS — I10 ESSENTIAL HYPERTENSION: ICD-10-CM

## 2019-05-15 PROBLEM — R93.89 ABNORMAL CT OF THE CHEST: Status: ACTIVE | Noted: 2019-05-15

## 2019-05-15 LAB
ALBUMIN SERPL BCP-MCNC: 3.5 G/DL (ref 3.5–5)
ALP SERPL-CCNC: 109 U/L (ref 46–116)
ALT SERPL W P-5'-P-CCNC: 29 U/L (ref 12–78)
ANION GAP SERPL CALCULATED.3IONS-SCNC: 16 MMOL/L (ref 4–13)
APTT PPP: 34 SECONDS (ref 26–38)
AST SERPL W P-5'-P-CCNC: 26 U/L (ref 5–45)
ATRIAL RATE: 105 BPM
BASOPHILS # BLD AUTO: 0.1 THOUSANDS/ΜL (ref 0–0.1)
BASOPHILS NFR BLD AUTO: 1 % (ref 0–1)
BILIRUB SERPL-MCNC: 0.65 MG/DL (ref 0.2–1)
BUN SERPL-MCNC: 51 MG/DL (ref 5–25)
CALCIUM SERPL-MCNC: 9.8 MG/DL (ref 8.3–10.1)
CHLORIDE SERPL-SCNC: 101 MMOL/L (ref 100–108)
CO2 SERPL-SCNC: 17 MMOL/L (ref 21–32)
CREAT SERPL-MCNC: 1.56 MG/DL (ref 0.6–1.3)
EOSINOPHIL # BLD AUTO: 0.82 THOUSAND/ΜL (ref 0–0.61)
EOSINOPHIL NFR BLD AUTO: 5 % (ref 0–6)
ERYTHROCYTE [DISTWIDTH] IN BLOOD BY AUTOMATED COUNT: 15.1 % (ref 11.6–15.1)
GFR SERPL CREATININE-BSD FRML MDRD: 46 ML/MIN/1.73SQ M
GLUCOSE SERPL-MCNC: 274 MG/DL (ref 65–140)
HCT VFR BLD AUTO: 34.2 % (ref 36.5–49.3)
HGB BLD-MCNC: 11 G/DL (ref 12–17)
IMM GRANULOCYTES # BLD AUTO: 0.08 THOUSAND/UL (ref 0–0.2)
IMM GRANULOCYTES NFR BLD AUTO: 1 % (ref 0–2)
INR PPP: 1.12 (ref 0.86–1.17)
LACTATE SERPL-SCNC: 1.4 MMOL/L (ref 0.5–2)
LACTATE SERPL-SCNC: 3 MMOL/L (ref 0.5–2)
LYMPHOCYTES # BLD AUTO: 1.44 THOUSANDS/ΜL (ref 0.6–4.47)
LYMPHOCYTES NFR BLD AUTO: 10 % (ref 14–44)
MCH RBC QN AUTO: 29.6 PG (ref 26.8–34.3)
MCHC RBC AUTO-ENTMCNC: 32.2 G/DL (ref 31.4–37.4)
MCV RBC AUTO: 92 FL (ref 82–98)
MONOCYTES # BLD AUTO: 0.57 THOUSAND/ΜL (ref 0.17–1.22)
MONOCYTES NFR BLD AUTO: 4 % (ref 4–12)
NEUTROPHILS # BLD AUTO: 12.16 THOUSANDS/ΜL (ref 1.85–7.62)
NEUTS SEG NFR BLD AUTO: 79 % (ref 43–75)
NRBC BLD AUTO-RTO: 0 /100 WBCS
NT-PROBNP SERPL-MCNC: 527 PG/ML
PLATELET # BLD AUTO: 400 THOUSANDS/UL (ref 149–390)
PMV BLD AUTO: 9.4 FL (ref 8.9–12.7)
POTASSIUM SERPL-SCNC: 5.3 MMOL/L (ref 3.5–5.3)
PROCALCITONIN SERPL-MCNC: <0.05 NG/ML
PROT SERPL-MCNC: 8.1 G/DL (ref 6.4–8.2)
PROTHROMBIN TIME: 14.5 SECONDS (ref 11.8–14.2)
QRS AXIS: -60 DEGREES
QRSD INTERVAL: 176 MS
QT INTERVAL: 374 MS
QTC INTERVAL: 503 MS
RBC # BLD AUTO: 3.71 MILLION/UL (ref 3.88–5.62)
SODIUM SERPL-SCNC: 134 MMOL/L (ref 136–145)
T WAVE AXIS: 101 DEGREES
TROPONIN I SERPL-MCNC: 0.02 NG/ML
VENTRICULAR RATE: 109 BPM
WBC # BLD AUTO: 15.17 THOUSAND/UL (ref 4.31–10.16)

## 2019-05-15 PROCEDURE — 83880 ASSAY OF NATRIURETIC PEPTIDE: CPT | Performed by: EMERGENCY MEDICINE

## 2019-05-15 PROCEDURE — 93010 ELECTROCARDIOGRAM REPORT: CPT | Performed by: INTERNAL MEDICINE

## 2019-05-15 PROCEDURE — 83605 ASSAY OF LACTIC ACID: CPT | Performed by: EMERGENCY MEDICINE

## 2019-05-15 PROCEDURE — 85025 COMPLETE CBC W/AUTO DIFF WBC: CPT | Performed by: EMERGENCY MEDICINE

## 2019-05-15 PROCEDURE — 96374 THER/PROPH/DIAG INJ IV PUSH: CPT

## 2019-05-15 PROCEDURE — 99223 1ST HOSP IP/OBS HIGH 75: CPT | Performed by: INTERNAL MEDICINE

## 2019-05-15 PROCEDURE — 99285 EMERGENCY DEPT VISIT HI MDM: CPT | Performed by: EMERGENCY MEDICINE

## 2019-05-15 PROCEDURE — 85730 THROMBOPLASTIN TIME PARTIAL: CPT | Performed by: EMERGENCY MEDICINE

## 2019-05-15 PROCEDURE — 36415 COLL VENOUS BLD VENIPUNCTURE: CPT | Performed by: EMERGENCY MEDICINE

## 2019-05-15 PROCEDURE — 84484 ASSAY OF TROPONIN QUANT: CPT | Performed by: EMERGENCY MEDICINE

## 2019-05-15 PROCEDURE — 96365 THER/PROPH/DIAG IV INF INIT: CPT

## 2019-05-15 PROCEDURE — 96361 HYDRATE IV INFUSION ADD-ON: CPT

## 2019-05-15 PROCEDURE — 71275 CT ANGIOGRAPHY CHEST: CPT

## 2019-05-15 PROCEDURE — 84145 PROCALCITONIN (PCT): CPT | Performed by: EMERGENCY MEDICINE

## 2019-05-15 PROCEDURE — 94660 CPAP INITIATION&MGMT: CPT

## 2019-05-15 PROCEDURE — 93005 ELECTROCARDIOGRAM TRACING: CPT

## 2019-05-15 PROCEDURE — 99285 EMERGENCY DEPT VISIT HI MDM: CPT

## 2019-05-15 PROCEDURE — 80053 COMPREHEN METABOLIC PANEL: CPT | Performed by: EMERGENCY MEDICINE

## 2019-05-15 PROCEDURE — 85610 PROTHROMBIN TIME: CPT | Performed by: EMERGENCY MEDICINE

## 2019-05-15 PROCEDURE — 87040 BLOOD CULTURE FOR BACTERIA: CPT | Performed by: EMERGENCY MEDICINE

## 2019-05-15 RX ORDER — METOPROLOL TARTRATE 50 MG/1
50 TABLET, FILM COATED ORAL EVERY 12 HOURS SCHEDULED
Status: DISCONTINUED | OUTPATIENT
Start: 2019-05-15 | End: 2019-05-23 | Stop reason: HOSPADM

## 2019-05-15 RX ORDER — TAMSULOSIN HYDROCHLORIDE 0.4 MG/1
0.4 CAPSULE ORAL
Status: DISCONTINUED | OUTPATIENT
Start: 2019-05-16 | End: 2019-05-23 | Stop reason: HOSPADM

## 2019-05-15 RX ORDER — ALLOPURINOL 100 MG/1
300 TABLET ORAL EVERY MORNING
Status: DISCONTINUED | OUTPATIENT
Start: 2019-05-16 | End: 2019-05-23 | Stop reason: HOSPADM

## 2019-05-15 RX ORDER — POTASSIUM CHLORIDE 1500 MG/1
20 TABLET, FILM COATED, EXTENDED RELEASE ORAL DAILY
Status: DISCONTINUED | OUTPATIENT
Start: 2019-05-16 | End: 2019-05-15 | Stop reason: SDUPTHER

## 2019-05-15 RX ORDER — PRAVASTATIN SODIUM 10 MG
20 TABLET ORAL
Status: DISCONTINUED | OUTPATIENT
Start: 2019-05-16 | End: 2019-05-23 | Stop reason: HOSPADM

## 2019-05-15 RX ORDER — POTASSIUM CHLORIDE 20 MEQ/1
20 TABLET, EXTENDED RELEASE ORAL DAILY
Status: DISCONTINUED | OUTPATIENT
Start: 2019-05-16 | End: 2019-05-16

## 2019-05-15 RX ORDER — ACETAMINOPHEN 325 MG/1
650 TABLET ORAL EVERY 6 HOURS PRN
Status: DISCONTINUED | OUTPATIENT
Start: 2019-05-15 | End: 2019-05-23 | Stop reason: HOSPADM

## 2019-05-15 RX ORDER — FUROSEMIDE 10 MG/ML
40 INJECTION INTRAMUSCULAR; INTRAVENOUS ONCE
Status: COMPLETED | OUTPATIENT
Start: 2019-05-15 | End: 2019-05-15

## 2019-05-15 RX ORDER — LIDOCAINE 50 MG/G
1 PATCH TOPICAL DAILY
Status: DISCONTINUED | OUTPATIENT
Start: 2019-05-15 | End: 2019-05-23 | Stop reason: HOSPADM

## 2019-05-15 RX ORDER — FUROSEMIDE 10 MG/ML
40 INJECTION INTRAMUSCULAR; INTRAVENOUS
Status: DISCONTINUED | OUTPATIENT
Start: 2019-05-15 | End: 2019-05-16

## 2019-05-15 RX ADMIN — METOPROLOL TARTRATE 50 MG: 50 TABLET, FILM COATED ORAL at 20:56

## 2019-05-15 RX ADMIN — CEFEPIME HYDROCHLORIDE 2000 MG: 2 INJECTION, POWDER, FOR SOLUTION INTRAVENOUS at 14:34

## 2019-05-15 RX ADMIN — SODIUM CHLORIDE 1000 ML: 0.9 INJECTION, SOLUTION INTRAVENOUS at 13:17

## 2019-05-15 RX ADMIN — FUROSEMIDE 40 MG: 10 INJECTION, SOLUTION INTRAMUSCULAR; INTRAVENOUS at 14:38

## 2019-05-15 RX ADMIN — APIXABAN 5 MG: 5 TABLET, FILM COATED ORAL at 20:56

## 2019-05-15 RX ADMIN — FUROSEMIDE 40 MG: 10 INJECTION, SOLUTION INTRAMUSCULAR; INTRAVENOUS at 20:56

## 2019-05-15 RX ADMIN — ACETAMINOPHEN 650 MG: 325 TABLET ORAL at 20:56

## 2019-05-15 RX ADMIN — IODIXANOL 85 ML: 320 INJECTION, SOLUTION INTRAVASCULAR at 14:14

## 2019-05-15 NOTE — ASSESSMENT & PLAN NOTE
· Secondary to acute on chronic diastolic congestive heart failure, bilateral pleural effusion, atelectasis, valvular heart disease, recent pneumonia  · Patient required BiPAP in the ER and improved with IV Lasix  · Currently on O2 via nasal cannula  · Wean down as tolerated the

## 2019-05-15 NOTE — ASSESSMENT & PLAN NOTE
· With volume overload and possible flash pulmonary edema which precipitated his admission to the hospital    · Continue Lasix 40 mg IV b i d   · Continue metoprolol b i d   · Hold ARB for now due to recent dye load    · Consult Cardiology

## 2019-05-15 NOTE — ASSESSMENT & PLAN NOTE
· With congestive heart failure and bilateral pleural effusion  · Infiltrates are likely from compression atelectasis  · Low suspicion for ongoing pneumonia  · He was treated on his last admission and procalcitonin is normal   · Observe off antibiotics

## 2019-05-15 NOTE — SEPSIS NOTE
Sepsis Note   Doy Gut 70 y o  male MRN: [id number]  Unit/Bed#: ED 18 Encounter: [id number]      qSOFA     Row Name 05/15/19 1437 05/15/19 1416 05/15/19 1337 05/15/19 1255       Altered mental status GCS < 15             Respiratory Rate > / =22  1  1  1  1     Systolic BP < / =658  0  0  0  0     Q Sofa Score  1  1  1  1         Initial Sepsis Screening     Row Name 05/15/19 1437                Is the patient's history suggestive of a new or worsening infection? (!) Yes (Proceed)  -VL        Suspected source of infection  pneumonia  -VL        Are two or more of the following signs & symptoms of infection both present and new to the patient?         Indicate SIRS criteria  Tachycardia > 90 bpm;Tachypnea > 20 resp per min;Leukocytosis (WBC > 79291 IJL)  -VL        If the answer is yes to both questions, suspicion of sepsis is present          If severe sepsis is present AND tissue hypoperfusion perists in the hour after fluid resuscitation or lactate > 4, the patient meets criteria for SEPTIC SHOCK          Are any of the following organ dysfunction criteria present within 6 hours of suspected infection and SIRS criteria that are NOT considered to be chronic conditions? (!) Yes  -VL        Organ dysfunction  Lactate > 2 0 mmol/L  -VL        Date of presentation of severe sepsis  05/15/19  -VL        Time of presentation of severe sepsis  1435  -VL        Tissue hypoperfusion persists in the hour after crystalloid fluid administration, evidenced, by either:          Was hypotension present within one hour of the conclusion of crystalloid fluid administration?           Date of presentation of septic shock          Time of presentation of septic shock            User Key  (r) = Recorded By, (t) = Taken By, (c) = Cosigned By    Initials Name Provider Type    VL Kirill Shepherd DO Physician

## 2019-05-15 NOTE — ASSESSMENT & PLAN NOTE
Lab Results   Component Value Date    HGBA1C 6 9 (H) 03/07/2019       No results for input(s): POCGLU in the last 72 hours      Blood Sugar Average: Last 72 hrs:    A1c at goal   Hold oral hypoglycemic agent  Substitute Januvia for Tradjenta while in the hospital  Placed on sliding scale insulin

## 2019-05-15 NOTE — ED NOTES
Patient transported to [address] [place] [address] 756, 4629 [facility]  05/15/19 7208

## 2019-05-15 NOTE — ED PROVIDER NOTES
History  Chief Complaint   Patient presents with    Respiratory Distress     Pt arrived via ems with sudden onset sob beginning about 45 minutes ago  Pt satting 75% on room air  D/c from hospital 1 week ago with pneumonia  Hx of chf      Here with acute onset of shortness of breath  Per pt admitted beginning of the month for PNA and doing well since discharge  Was d/c on meds and prn oxygen  Felt fine last night and this am  Was taking grandson to appt when he suddenly became severely dyspneic  EMS called and pt reportedly only 75% on room air  Currently on NRB, tachycardic and tachypnic  Pt denies f/c/s, no cp/pressure, no abd pain, no n/v  Has persistent cough w/ yellow sputum that is unchanged from discharge  Has chronic LE edema - states his swelling is much better than usual   No other changes in meds  No other co      History provided by:  Patient   used: No    Shortness of Breath   Severity:  Severe  Onset quality:  Gradual  Duration:  1 hour  Timing:  Constant  Progression:  Unchanged  Chronicity:  New  Context: not URI    Relieved by:  None tried  Worsened by:  Exertion and movement  Ineffective treatments:  Oxygen  Associated symptoms: cough, diaphoresis and sputum production    Associated symptoms: no abdominal pain, no chest pain, no fever, no hemoptysis, no neck pain, no vomiting and no wheezing    Risk factors: prolonged immobilization    Risk factors: no family hx of DVT, no hx of PE/DVT and no tobacco use (former)        Prior to Admission Medications   Prescriptions Last Dose Informant Patient Reported? Taking?    Cholecalciferol (VITAMIN D-3) 1000 units CAPS  Self Yes Yes   Sig: Take 1 capsule by mouth every morning     Linagliptin (TRADJENTA) 5 MG TABS   Yes Yes   Sig: Take 5 mg by mouth daily   Multiple Vitamin (MULTIVITAMIN) tablet  Self Yes Yes   Sig: Take 1 tablet by mouth daily IN AM   allopurinol (ZYLOPRIM) 300 mg tablet   No Yes   Sig: TAKE 1 TABLET (300 MG TOTAL) BY MOUTH EVERY MORNING   amLODIPine (NORVASC) 10 mg tablet   No Yes   Sig: Take 1 tablet (10 mg total) by mouth daily   apixaban (ELIQUIS) 5 mg   No Yes   Sig: Take 1 tablet (5 mg total) by mouth every 12 (twelve) hours   betamethasone valerate (VALISONE) 0 1 % cream  Self Yes Yes   Sig: APPLY TO BACK,ARMS,CHEST,LEGS AND ABDOMEN TWICE A DAY   furosemide (LASIX) 40 mg tablet   No Yes   Sig: Take 1 tablet (40 mg total) by mouth 2 (two) times a day   glimepiride (AMARYL) 4 mg tablet  Self No Yes   Sig: TAKE 1 TABLET BY MOUTH TWICE A DAY   Patient taking differently: TAKE 1 TABLET BY MOUTH IN THE AM AND 1/2 TABLET IN PM   glucagon (GLUCAGON EMERGENCY) 1 MG injection   No No   Sig: Inject 1 mg under the skin once as needed for low blood sugar for up to 1 dose   glucose blood (ONE TOUCH ULTRA TEST) test strip  Self No No   Si each by Other route 3 (three) times a day   hydrOXYzine HCL (ATARAX) 10 mg tablet  Self Yes Yes   Sig: Take 10 mg by mouth every 6 (six) hours as needed     losartan (COZAAR) 50 mg tablet   No Yes   Sig: Take 1 5 tablets (75 mg total) by mouth daily   metFORMIN (GLUCOPHAGE) 500 mg tablet  Self No Yes   Sig: Take 2 tablets (1,000 mg total) by mouth 2 (two) times a day with meals [id number] /twice a day   metoprolol tartrate (LOPRESSOR) 50 mg tablet   No Yes   Sig: Take 1 tablet (50 mg total) by mouth every 12 (twelve) hours   omega-3-acid ethyl esters (LOVAZA) 1 g capsule   No Yes   Sig: Take 2 capsules (2 g total) by mouth 2 (two) times a day   potassium chloride 20 MEQ TBCR   No Yes   Sig: Take 1 tablet (20 mEq total) by mouth daily   simvastatin (ZOCOR) 20 mg tablet   No Yes   Sig: Take 1 tablet (20 mg total) by mouth daily at bedtime   tamsulosin (FLOMAX) 0 4 mg   No Yes   Sig: Take 1 capsule (0 4 mg total) by mouth daily with dinner      Facility-Administered Medications: None       Past Medical History:   Diagnosis Date    Arthritis     OA    Bruise of both arms     forearms and both hands    Bruises easily     [facility]     Diabetes mellitus [facility]     Diabetic foot ulcer [facility]     Eczema     Erectile dysfunction     Gout     Hyperlipidemia     Hypertension     Murmur     Nephropathy     Osteoarthritis     [facility]     Seasonal allergies     Toe infection     bilat great toes    Vertigo     Walks frequently     Wears dentures     upper    Wears glasses        Past Surgical History:   Procedure Laterality Date    CARDIAC PACEMAKER PLACEMENT      CARPAL TUNNEL RELEASE Left     CARPAL TUNNEL RELEASE Right     CARPAL TUNNEL RELEASE      KNEE ARTHROSCOPY Left     KNEE ARTHROSCOPY Right     KNEE SURGERY      IN AMPUTATION TOE,I-P JT Bilateral 5/2/2017    Procedure: PARTIAL AMPUTATION RIGHT AND LEFT HALLUX ;  Surgeon: Meggan Ernandez DPM;  Location: [facility];  Service: Podiatry    IN AMPUTATION TOE,MT-P JT Left 7/25/2017    Procedure: 2ND TOE AMPUTATION;  Surgeon: Meggan Ernandez DPM;  Location: [facility];  Service: Podiatry    SHOULDER ARTHROSCOPY Left     with screws,RTC    SHOULDER SURGERY      VASECTOMY         Family History   Problem Relation Age of Onset    Heart disease Father     No Known Problems Mother     Hypertension Father     Pulmonary embolism Father      I have reviewed and agree with the history as documented  Social History     Tobacco Use    Smoking status: Former Smoker     Packs/day: 1 00     Years: 30 00     Pack years: 30 00     Types: Cigarettes    Smokeless tobacco: Never Used    Tobacco comment: quit 10 years ago   Substance Use Topics    Alcohol use: Never     Frequency: Never    Drug use: Never        Review of Systems   Constitutional: Positive for diaphoresis  Negative for fever  Respiratory: Positive for cough, sputum production and shortness of breath  Negative for hemoptysis and wheezing  Cardiovascular: Negative for chest pain     Gastrointestinal: Negative for abdominal pain and vomiting  Musculoskeletal: Negative for neck pain  All other systems reviewed and are negative  Physical Exam  Physical Exam   Constitutional: He is oriented to person, place, and time  He appears well-developed and well-nourished  HENT:   Nose: Nose normal    Mouth/Throat: Oropharynx is clear and moist    Eyes: Conjunctivae are normal    Neck: Neck supple  Cardiovascular: Regular rhythm  Tachycardia present  Pulmonary/Chest: No accessory muscle usage  Tachypnea noted  No respiratory distress  He has decreased breath sounds  He has rales  Abdominal: Normal appearance  There is no tenderness  Musculoskeletal: He exhibits edema (2+ pitting)  He exhibits no deformity  Neurological: He is alert and oriented to person, place, and time  Skin: Skin is warm  Psychiatric: He has a normal mood and affect  Nursing note and vitals reviewed        Vital Signs  ED Triage Vitals   Temperature Pulse Respirations Blood Pressure SpO2   05/15/19 1257 05/15/19 1255 05/15/19 1255 05/15/19 1255 05/15/19 1255   98 °F (36 7 °C) (!) 110 (!) 26 (!) 175/63 95 %      Temp Source Heart Rate Source Patient Position - Orthostatic VS BP Location FiO2 (%)   05/15/19 1257 05/15/19 1255 05/15/19 1255 05/15/19 1255 --   Oral Monitor Lying Right arm       Pain Score       05/15/19 1255       No Pain           Vitals:    05/15/19 1416 05/15/19 1437 05/15/19 1514 05/15/19 1614   BP: (!) 184/68 154/64 153/72 152/67   Pulse: (!) 118 (!) 111 98    Patient Position - Orthostatic VS:  Sitting Lying Lying         Visual Acuity      ED Medications  Medications   sodium chloride 0 9 % bolus 1,000 mL (0 mL Intravenous Stopped 5/15/19 1435)   cefepime (MAXIPIME) 2 g/50 mL dextrose IVPB (0 mg Intravenous Stopped 5/15/19 1534)   iodixanol (VISIPAQUE) 320 MG/ML injection 85 mL (85 mL Intravenous Given 5/15/19 1414)   furosemide (LASIX) injection 40 mg (40 mg Intravenous Given 5/15/19 1438)       Diagnostic Studies  Results Reviewed     Procedure Component Value Units Date/Time    Procalcitonin [[id number]]  (Normal) Collected:  05/15/19 1314    Lab Status:  Final result Specimen:  Blood from Arm, Right Updated:  05/15/19 1617     Procalcitonin <0 05 ng/ml     Lactic acid x2 [[id number]]  (Normal) Collected:  05/15/19 1514    Lab Status:  Final result Specimen:  Blood from Arm, Right Updated:  05/15/19 1537     LACTIC ACID 1 4 mmol/L     Narrative:       Result may be elevated if tourniquet was used during collection      Comprehensive metabolic panel [[id number]]  (Abnormal) Collected:  05/15/19 1314    Lab Status:  Final result Specimen:  Blood from Arm, Right Updated:  05/15/19 1347     Sodium 134 mmol/L      Potassium 5 3 mmol/L      Chloride 101 mmol/L      CO2 17 mmol/L      ANION GAP 16 mmol/L      BUN 51 mg/dL      Creatinine 1 56 mg/dL      Glucose 274 mg/dL      Calcium 9 8 mg/dL      AST 26 U/L      ALT 29 U/L      Alkaline Phosphatase 109 U/L      Total Protein 8 1 g/dL      Albumin 3 5 g/dL      Total Bilirubin 0 65 mg/dL      eGFR 46 ml/min/1 73sq m     Narrative:       [place] guidelines for Chronic Kidney Disease (CKD):     Stage 1 with normal or high GFR (GFR > 90 mL/min/1 73 square meters)    Stage 2 Mild CKD (GFR = 60-89 mL/min/1 73 square meters)    Stage 3A Moderate CKD (GFR = 45-59 mL/min/1 73 square meters)    Stage 3B Moderate CKD (GFR = 30-44 mL/min/1 73 square meters)    Stage 4 Severe CKD (GFR = 15-29 mL/min/1 73 square meters)    Stage 5 End Stage CKD (GFR <15 mL/min/1 73 square meters)  Note: GFR calculation is accurate only with a steady state creatinine    NT-BNP PRO [[id number]]  (Abnormal) Collected:  05/15/19 1314    Lab Status:  Final result Specimen:  Blood from Arm, Right Updated:  05/15/19 1347     NT-proBNP 527 pg/mL     Lactic acid x2 [[id number]]  (Abnormal) Collected:  05/15/19 1314    Lab Status:  Final result Specimen:  Blood from Arm, Right Updated:  05/15/19 1346 LACTIC ACID 3 0 mmol/L     Narrative:       Result may be elevated if tourniquet was used during collection  Troponin I [[id number]]  (Normal) Collected:  05/15/19 1314    Lab Status:  Final result Specimen:  Blood from Arm, Right Updated:  05/15/19 1340     Troponin I 0 02 ng/mL     Protime-INR [[id number]]  (Abnormal) Collected:  05/15/19 1314    Lab Status:  Final result Specimen:  Blood from Arm, Right Updated:  05/15/19 1338     Protime 14 5 seconds      INR 1 12    APTT [[id number]]  (Normal) Collected:  05/15/19 1314    Lab Status:  Final result Specimen:  Blood from Arm, Right Updated:  05/15/19 1338     PTT 34 seconds     CBC and differential [[id number]]  (Abnormal) Collected:  05/15/19 1314    Lab Status:  Final result Specimen:  Blood from Arm, Right Updated:  05/15/19 1325     WBC 15 17 Thousand/uL      RBC 3 71 Million/uL      Hemoglobin 11 0 g/dL      Hematocrit 34 2 %      MCV 92 fL      MCH 29 6 pg      MCHC 32 2 g/dL      RDW 15 1 %      MPV 9 4 fL      Platelets 265 Thousands/uL      nRBC 0 /100 WBCs      Neutrophils Relative 79 %      Immat GRANS % 1 %      Lymphocytes Relative 10 %      Monocytes Relative 4 %      Eosinophils Relative 5 %      Basophils Relative 1 %      Neutrophils Absolute 12 16 Thousands/µL      Immature Grans Absolute 0 08 Thousand/uL      Lymphocytes Absolute 1 44 Thousands/µL      Monocytes Absolute 0 57 Thousand/µL      Eosinophils Absolute 0 82 Thousand/µL      Basophils Absolute 0 10 Thousands/µL     Blood culture #1 [[id number]] Collected:  05/15/19 1317    Lab Status: In process Specimen:  Blood from Arm, Left Updated:  05/15/19 1323    Blood culture #2 [[id number]] Collected:  05/15/19 1314    Lab Status:   In process Specimen:  Blood from Arm, Right Updated:  05/15/19 1321                 CTA ED chest PE study   ED Interpretation by Taryn Kahn DO (05/15 1431)   See below      Final Result by Jannet Doan DO (05/15 1422)      No evidence of pulmonary embolism  Moderate bilateral pleural effusions, left greater than right  Adjacent consolidations within the posterior lower lobe suggestive of atelectasis  Superimposed infection not excluded  Mild pulmonary edema  Mediastinal and hilar adenopathy may be reactive to pulmonary edema or less likely infection                    Workstation performed: [id number]                    Procedures  ECG 12 Lead Documentation  Date/Time: 5/15/2019 12:59 PM  Performed by: Katey Carney DO  Authorized by: Katey Carney DO     ECG reviewed by me, the ED Provider: yes    Patient location:  ED  Previous ECG:     Previous ECG:  Compared to current  Interpretation:     Interpretation: non-specific    Rate:     ECG rate:  89    ECG rate assessment: normal    Rhythm:     Rhythm: paced    Pacing:     Capture:  Complete  QRS:     QRS axis:  Left    CriticalCare Time  Performed by: Katey Carney DO  Authorized by: Katey Carney DO     Critical care provider statement:     Critical care time (minutes):  35    Critical care time was exclusive of:  Separately billable procedures and treating other patients and teaching time    Critical care was necessary to treat or prevent imminent or life-threatening deterioration of the following conditions:  Respiratory failure and sepsis    Critical care was time spent personally by me on the following activities:  Blood draw for specimens, obtaining history from patient or surrogate, development of treatment plan with patient or surrogate, examination of patient, discussions with consultants, ordering and performing treatments and interventions, ordering and review of laboratory studies, ordering and review of radiographic studies, re-evaluation of patient's condition and review of old charts    I assumed direction of critical care for this patient from another provider in my specialty: no             Phone Contacts  ED Phone Contact    ED Course  ED Course as of May 15 1713 Wed May 15, 2019   1326 Was normal at recent discharge   WBC(!): 15 17   1344 With elevated wbc, will cover w/ antibiotics for severe sepsis pending CT scan for PE   LACTIC ACID(!!): 3 0   1347 Hydrated - ready for scan   eGFR: 46   1421 Pt back from CT and more acutely tachypneic w/ increased WOB - still on NRB w/ sats of 94%  Will give trial of BiPAP while awaiting CT results  [address] noted  Will admit for severe sepsis given infiltrates noted b/l w/ effusion and elevated white count  No PE at this time      1432 Paged [facility] to discuss for admission to step down      [address] - will call back with step down level  1508 Discussed care w/ [address]  Would like to see how pt does after some diuresis and on BiPap  Would like him re-evaluated in 45-60min to see if he can come off BiPap      1538 Feels better on BiPap  Hasn't diuresed yet  Will continue monitoring      1600 Pt diuresed 700ml  Will trial off BiPap      1629 Sats 93% on 6L NC  Will d/w [facility] team      [address] Per [facility] - okay for floor  Contacted [facility] for admission                      Initial Sepsis Screening     [address] Name 05/15/19 1437                Is the patient's history suggestive of a new or worsening infection? (!) Yes (Proceed)  -VL        Suspected source of infection  pneumonia  -VL        Are two or more of the following signs & symptoms of infection both present and new to the patient?           Indicate SIRS criteria  Tachycardia > 90 bpm;Tachypnea > 20 resp per min;Leukocytosis (WBC > 28571 IJL)  -VL        If the answer is yes to both questions, suspicion of sepsis is present          If severe sepsis is present AND tissue hypoperfusion perists in the hour after fluid resuscitation or lactate > 4, the patient meets criteria for SEPTIC SHOCK          Are any of the following organ dysfunction criteria present within 6 hours of suspected infection and SIRS criteria that are NOT considered to be chronic conditions? (!) Yes  -VL        Organ dysfunction  Lactate > 2 0 mmol/L  -VL        Date of presentation of severe sepsis  05/15/19  -VL        Time of presentation of severe sepsis  1435  -        Tissue hypoperfusion persists in the hour after crystalloid fluid administration, evidenced, by either:          Was hypotension present within one hour of the conclusion of crystalloid fluid administration?           Date of presentation of septic shock          Time of presentation of septic shock            User Key  (r) = Recorded By, (t) = Taken By, (c) = Cosigned By    Initials Name Provider Type    MICHELLE Degroot DO Physician                  MDM  Number of Diagnoses or Management Options  Acute respiratory distress: new and requires workup  Bilateral pleural effusion: new and requires workup  [facility] ): new and requires workup     Amount and/or Complexity of Data Reviewed  Clinical lab tests: reviewed and ordered  Tests in the radiology section of CPT®: reviewed and ordered  Tests in the medicine section of CPT®: reviewed and ordered  Decide to obtain previous medical records or to obtain history from someone other than the patient: yes  Independent visualization of images, tracings, or specimens: yes        Disposition  Final diagnoses:   Acute respiratory distress   Bilateral pleural effusion   CH[facility]congestive heart failure) [facility] )     Time reflects when diagnosis was documented in both MDM as applicable and the Disposition within this note     Time User Action Codes Description Comment    5/15/2019  2:38 PM Ayesha Russell Add [R06 03] Acute respiratory distress     5/15/2019  2:41 PM Ayesha Russell Add [J18 9] Bilateral pneumonia     5/15/2019  2:42 PM Ayesha Russell Add [J90] Bilateral pleural effusion     5/15/2019  4:49 PM Ayesha Russell Remove [J18 9] Bilateral pneumonia     5/15/2019  4:50 PM Ayesha Russell Add [I50 9] CHF (congestive heart failure) [facility])       ED Disposition     ED Disposition Condition Date/Time Comment    Admit Stable Wed May 15, 2019  4:50 PM Case was discussed with Dr Juan Rosas and the patient's admission status was agreed to be Admission Status: inpatient status to the service of Dr Juan Rosas   Follow-up Information    None         Patient's Medications   Discharge Prescriptions    No medications on file     No discharge procedures on file      ED Provider  Electronically Signed by           Amy Muir DO  05/15/19 1710

## 2019-05-15 NOTE — ED NOTES
Pt switched from bipap to 6L via nasal cannula at this time  Respiratory made aware   Oxygen saturation of 94%     Raymundo Rogers RN  05/15/19 1403

## 2019-05-15 NOTE — ED NOTES
Pt rang call bell at this time reporting worsening SOB since returning from imaging  Pt placed in high fowlers position for comfort  Pt SaO2 94% on 15L nonrebreather   Dr Torres Earthly aware and present at bedside      [facility], [address] [place] [address]  05/15/19 5989

## 2019-05-15 NOTE — H&P
H&P- Harlan Greenwood 1955, 70 y o  male MRN: [id number]    Unit/Bed#: ED 18 Encounter: [id number]    Primary Care Provider: Andrew Bravo DO   Date and time admitted to hospital: 5/15/2019 12:50 PM        Acute respiratory failure with hypoxia [facility])  Assessment & Plan  · Secondary to acute on chronic diastolic congestive heart failure, bilateral pleural effusion, atelectasis, valvular heart disease, recent pneumonia  · Patient required BiPAP in the ER and improved with IV Lasix  · Currently on O2 via nasal cannula  · Wean down as tolerated the    Acute on chronic diastolic CHF (congestive heart failure) ([facility] )  Assessment & Plan  · With volume overload and possible flash pulmonary edema which precipitated his admission to the hospital    · Continue Lasix 40 mg IV b i d   · Continue metoprolol b i d   · Hold ARB for now due to recent dye load  · Consult Cardiology    Abnormal CT of the chest  Assessment & Plan  · With congestive heart failure and bilateral pleural effusion  · Infiltrates are likely from compression atelectasis  · Low suspicion for ongoing pneumonia  · He was treated on his last admission and procalcitonin is normal   · Observe off antibiotics    Type 2 diabetes mellitus with chronic kidney disease, without long-term current use of insulin ([facility])  Assessment & Plan  Lab Results   Component Value Date    HGBA1C 6 9 (H) 03/07/2019       No results for input(s): POCGLU in the last 72 hours  Blood Sugar Average: Last 72 hrs:    A1c at goal   Hold oral hypoglycemic agent  Substitute Januvia for Tradjenta while in the hospital  Placed on sliding scale insulin      PAF (paroxysmal atrial fibrillation) ([facility] )  Assessment & Plan  · Fully anticoagulated on Eliquis    · Status post permanent pacemaker  · Currently V paced      VTE Prophylaxis: Apixaban (Eliquis)  / sequential compression device   Code Status:  Full code  POLST: There is no POLST form on file for this patient (pre-hospital)  Discussion with family:  Left voicemail for wife    Anticipated Length of Stay:  Patient will be admitted on an Inpatient basis with an anticipated length of stay of  At least 2 midnights  Justification for Hospital Stay: chf    Total Time for Visit, including Counseling / Coordination of Care: 45 minutes  Greater than 50% of this total time spent on direct patient counseling and coordination of care  Chief Complaint:   Shortness of breath    History of Present Illness:    Alberto Hancock is a 70 y o  male who presents with shortness of breath  He has known history of diastolic heart failure, paroxysmal atrial fibrillation, complete heart block status post recent permanent pacemaker placement, and recent admission for CHF exacerbation and pneumonia between May 1-May 8, 2019  He was discharged on oral Lasix 40 mg b i d  And few more days of antibiotics to complete his treatment  He reported being compliant with his medications and was doing well for the past few days  He did have occasional coughing since his pneumonia but without fever or chills  He was discharged on oxygen which he has not needed since he got home  This morning he was at his usual state of health and drove his grandson to an appointment  After this, he developed sudden shortness of breath and needed to come to the ER  He was in respiratory distress requiring BiPAP in the ER and improved after being treated with IV Lasix  On further questioning the patient reported for lb weight gain for the past 2 days  He denies eating salty foods or dietary indiscretion  Review of Systems:    Review of Systems   Constitutional: Positive for unexpected weight change  Negative for chills and fever  HENT: Negative  Eyes: Negative  Respiratory: Positive for shortness of breath  Cardiovascular: Positive for leg swelling  Negative for chest pain and palpitations  Gastrointestinal: Negative  Genitourinary: Negative  Musculoskeletal: Negative  Neurological: Negative  Psychiatric/Behavioral: Negative  All other systems reviewed and are negative  Past Medical and Surgical History:     Past Medical History:   Diagnosis Date    Arthritis     OA    Bruise of both arms     forearms and both hands    Bruises easily     CHF (congestive heart failure) ([facility] )     Diabetes mellitus ([facility] )     Diabetic foot ulcer ([facility] )     Eczema     Erectile dysfunction     Gout     Hyperlipidemia     Hypertension     Murmur     Nephropathy     Osteoarthritis     [facility]AD [facility]peripheral artery disease) ([facility])     Seasonal allergies     Toe infection     bilat great toes    Vertigo     Walks frequently     Wears dentures     upper    Wears glasses        Past Surgical History:   Procedure Laterality Date    CARDIAC PACEMAKER PLACEMENT      CARPAL TUNNEL RELEASE Left     CARPAL TUNNEL RELEASE Right     CARPAL TUNNEL RELEASE      KNEE ARTHROSCOPY Left     KNEE ARTHROSCOPY Right     KNEE SURGERY      NH AMPUTATION TOE,I-P JT Bilateral 5/2/2017    Procedure: PARTIAL AMPUTATION RIGHT AND LEFT HALLUX ;  Surgeon: Benigno Nicole DPM;  Location: [facility];  Service: Podiatry    NH AMPUTATION TOE,MT-P JT Left 7/25/2017    Procedure: 2ND TOE AMPUTATION;  Surgeon: Benigno Nicole DPM;  Location: [facility];  Service: Podiatry    SHOULDER ARTHROSCOPY Left     with screws,RTC    SHOULDER SURGERY      VASECTOMY         Meds/Allergies:    Prior to Admission medications    Medication Sig Start Date End Date Taking?  Authorizing Provider   allopurinol (ZYLOPRIM) 300 mg tablet TAKE 1 TABLET (300 MG TOTAL) BY MOUTH EVERY MORNING 2/9/19  Yes Williams Jenkins DO   amLODIPine (NORVASC) 10 mg tablet Take 1 tablet (10 mg total) by mouth daily 12/5/18  Yes Lenora Mann MD   apixaban (ELIQUIS) 5 mg Take 1 tablet (5 mg total) by mouth every 12 (twelve) hours 2/23/19  Yes Joceline Hodges MD   betamethasone valerate (VALISONE) 0 1 % cream APPLY TO BACK,ARMS,CHEST,LEGS AND ABDOMEN TWICE A DAY 10/1/18  Yes Historical Provider, MD   Cholecalciferol (VITAMIN D-3) 1000 units CAPS Take 1 capsule by mouth every morning     Yes Historical Provider, MD   furosemide (LASIX) 40 mg tablet Take 1 tablet (40 mg total) by mouth 2 (two) times a day 5/8/19  Yes Jorge Wells DO   glimepiride (AMARYL) 4 mg tablet TAKE 1 TABLET BY MOUTH TWICE A DAY  Patient taking differently: TAKE 1 TABLET BY MOUTH IN THE AM AND 1/2 TABLET IN PM 10/30/18  Yes Dell Adam,    hydrOXYzine HCL (ATARAX) 10 mg tablet Take 10 mg by mouth every 6 (six) hours as needed   10/24/18  Yes Historical Provider, MD   Linagliptin (TRADJENTA) 5 MG TABS Take 5 mg by mouth daily   Yes Historical Provider, MD   losartan (COZAAR) 50 mg tablet Take 1 5 tablets (75 mg total) by mouth daily 12/10/18  Yes Eladio Patton DO   metFORMIN (GLUCOPHAGE) 500 mg tablet Take 2 tablets (1,000 mg total) by mouth 2 (two) times a day with meals [id number] /twice a day 7/17/18  Yes Arlin Ospina DO   metoprolol tartrate (LOPRESSOR) 50 mg tablet Take 1 tablet (50 mg total) by mouth every 12 (twelve) hours 11/27/18  Yes Colby Ocampo MD   Multiple Vitamin (MULTIVITAMIN) tablet Take 1 tablet by mouth daily IN AM   Yes Historical Provider, MD   omega-3-acid ethyl esters (LOVAZA) 1 g capsule Take 2 capsules (2 g total) by mouth 2 (two) times a day 12/5/18  Yes Yaquelin Quinteros MD   potassium chloride 20 MEQ TBCR Take 1 tablet (20 mEq total) by mouth daily 5/8/19  Yes Jorge Wells DO   simvastatin (ZOCOR) 20 mg tablet Take 1 tablet (20 mg total) by mouth daily at bedtime 11/27/18  Yes Colby Ocampo MD   tamsulosin [facility]) 0 4 mg Take 1 capsule (0 4 mg total) by mouth daily with dinner 3/19/19  Yes Arlin Ospina DO   glucagon (GLUCAGON EMERGENCY) 1 MG injection Inject 1 mg under the skin once as needed for low blood sugar for up to 1 dose 3/8/19   PAT Call[place]   glucose blood (ONE TOUCH ULTRA TEST) test strip 1 each by Other route 3 (three) times a day 11/23/18   Estuardo Barron DO     I have reviewed home medications with a medical source (PCP, Pharmacy, other)  Allergies: Allergies   Allergen Reactions    Invokana [Canagliflozin]     Lamisil [Terbinafine] Rash    Lamisil [Terbinafine] Blisters     Wife states " His skin peeled from head to toe"    Other Swelling     Pomegranate - facial swelling, no swelling of tongue, esophagus  Adhesive tape   Latex Rash       Social History:     Marital Status: /Civil Union   Occupation:  Retired  Patient Pre-hospital Living Situation:  Independent  Patient Pre-hospital Level of Mobility:  Independent  Patient Pre-hospital Diet Restrictions:  None  Substance Use History:   Social History     Substance and Sexual Activity   Alcohol Use Never    Frequency: Never     Social History     Tobacco Use   Smoking Status Former Smoker    Packs/day: 1 00    Years: 30 00    Pack years: 30 00    Types: Cigarettes   Smokeless Tobacco Never Used   Tobacco Comment    quit 10 years ago     Social History     Substance and Sexual Activity   Drug Use Never       Family History:    Family History   Problem Relation Age of Onset    Heart disease Father     No Known Problems Mother     Hypertension Father     Pulmonary embolism Father        Physical Exam:     Vitals:   Blood Pressure: 161/74 (05/15/19 1716)  Pulse: 93 (05/15/19 1716)  Temperature: 98 °F (36 7 °C) (05/15/19 1257)  Temp Source: Oral (05/15/19 1257)  Respirations: 22 (05/15/19 1716)  Weight - Scale: 103 kg (226 lb 3 1 oz) (05/15/19 1255)  SpO2: 94 % (05/15/19 1716)    Physical Exam   Constitutional: He is oriented to person, place, and time  He appears well-developed  No distress  HENT:   Head: Normocephalic and atraumatic  Eyes: No scleral icterus  Neck: JVD present  Cardiovascular: Regular rhythm  Exam reveals no gallop and no friction rub  Murmur heard  Pulmonary/Chest: No stridor  No respiratory distress  He has no wheezes     Spoke in short but full sentences  Not in acute distress  No pursed lip breathing  No tripod position  Bilateral crackles on all lung fields   Abdominal: Soft  He exhibits no distension  There is no tenderness  Musculoskeletal:   Plus three pitting edema   Neurological: He is alert and oriented to person, place, and time  Skin: Skin is warm and dry  He is not diaphoretic  Psychiatric:   Pleasant mood           Additional Data:     Lab Results: I have personally reviewed pertinent reports  Results from last 7 days   Lab Units 05/15/19  1314   WBC Thousand/uL 15 17*   HEMOGLOBIN g/dL 11 0*   HEMATOCRIT % 34 2*   PLATELETS Thousands/uL 400*   NEUTROS PCT % 79*   LYMPHS PCT % 10*   MONOS PCT % 4   EOS PCT % 5     Results from last 7 days   Lab Units 05/15/19  1314   SODIUM mmol/L 134*   POTASSIUM mmol/L 5 3   CHLORIDE mmol/L 101   CO2 mmol/L 17*   BUN mg/dL 51*   CREATININE mg/dL 1 56*   ANION GAP mmol/L 16*   CALCIUM mg/dL 9 8   ALBUMIN g/dL 3 5   TOTAL BILIRUBIN mg/dL 0 65   ALK PHOS U/L 109   ALT U/L 29   AST U/L 26   GLUCOSE RANDOM mg/dL 274*     Results from last 7 days   Lab Units 05/15/19  1314   INR  1 12             Results from last 7 days   Lab Units 05/15/19  1514 05/15/19  1314   LACTIC ACID mmol/L 1 4 3 0*   PROCALCITONIN ng/ml  --  <0 05       Imaging: I have personally reviewed pertinent reports  and I have personally reviewed pertinent films in PACS    CTA ED chest PE study   ED Interpretation by Anita Molina DO (05/15 1431)   See below      Final Result by Cristopher Varela DO (05/15 1422)      No evidence of pulmonary embolism  Moderate bilateral pleural effusions, left greater than right  Adjacent consolidations within the posterior lower lobe suggestive of atelectasis  Superimposed infection not excluded  Mild pulmonary edema  Mediastinal and hilar adenopathy may be reactive to pulmonary edema or less likely infection                    Workstation performed: [id number] EKG, Pathology, and Other Studies Reviewed on Admission:   · EKG:  Ventricular paced    Allscripts / Epic Records Reviewed: Yes     ** Please Note: This note has been constructed using a voice recognition system   **

## 2019-05-16 PROBLEM — E87.5 HYPERKALEMIA: Status: ACTIVE | Noted: 2019-05-16

## 2019-05-16 LAB
ANION GAP SERPL CALCULATED.3IONS-SCNC: 8 MMOL/L (ref 4–13)
BASOPHILS # BLD AUTO: 0.09 THOUSANDS/ΜL (ref 0–0.1)
BASOPHILS NFR BLD AUTO: 1 % (ref 0–1)
BUN SERPL-MCNC: 46 MG/DL (ref 5–25)
CALCIUM SERPL-MCNC: 9.5 MG/DL (ref 8.3–10.1)
CHLORIDE SERPL-SCNC: 106 MMOL/L (ref 100–108)
CO2 SERPL-SCNC: 23 MMOL/L (ref 21–32)
CREAT SERPL-MCNC: 1.31 MG/DL (ref 0.6–1.3)
EOSINOPHIL # BLD AUTO: 0.96 THOUSAND/ΜL (ref 0–0.61)
EOSINOPHIL NFR BLD AUTO: 7 % (ref 0–6)
ERYTHROCYTE [DISTWIDTH] IN BLOOD BY AUTOMATED COUNT: 15.3 % (ref 11.6–15.1)
GFR SERPL CREATININE-BSD FRML MDRD: 57 ML/MIN/1.73SQ M
GLUCOSE SERPL-MCNC: 109 MG/DL (ref 65–140)
GLUCOSE SERPL-MCNC: 143 MG/DL (ref 65–140)
GLUCOSE SERPL-MCNC: 150 MG/DL (ref 65–140)
GLUCOSE SERPL-MCNC: 155 MG/DL (ref 65–140)
GLUCOSE SERPL-MCNC: 240 MG/DL (ref 65–140)
HCT VFR BLD AUTO: 30 % (ref 36.5–49.3)
HGB BLD-MCNC: 9.6 G/DL (ref 12–17)
IMM GRANULOCYTES # BLD AUTO: 0.06 THOUSAND/UL (ref 0–0.2)
IMM GRANULOCYTES NFR BLD AUTO: 1 % (ref 0–2)
LYMPHOCYTES # BLD AUTO: 0.85 THOUSANDS/ΜL (ref 0.6–4.47)
LYMPHOCYTES NFR BLD AUTO: 7 % (ref 14–44)
MCH RBC QN AUTO: 29.6 PG (ref 26.8–34.3)
MCHC RBC AUTO-ENTMCNC: 32 G/DL (ref 31.4–37.4)
MCV RBC AUTO: 93 FL (ref 82–98)
MONOCYTES # BLD AUTO: 0.68 THOUSAND/ΜL (ref 0.17–1.22)
MONOCYTES NFR BLD AUTO: 5 % (ref 4–12)
NEUTROPHILS # BLD AUTO: 10.5 THOUSANDS/ΜL (ref 1.85–7.62)
NEUTS SEG NFR BLD AUTO: 79 % (ref 43–75)
NRBC BLD AUTO-RTO: 0 /100 WBCS
PLATELET # BLD AUTO: 342 THOUSANDS/UL (ref 149–390)
PMV BLD AUTO: 9.5 FL (ref 8.9–12.7)
POTASSIUM SERPL-SCNC: 5.4 MMOL/L (ref 3.5–5.3)
RBC # BLD AUTO: 3.24 MILLION/UL (ref 3.88–5.62)
SODIUM SERPL-SCNC: 137 MMOL/L (ref 136–145)
WBC # BLD AUTO: 13.14 THOUSAND/UL (ref 4.31–10.16)

## 2019-05-16 PROCEDURE — 94760 N-INVAS EAR/PLS OXIMETRY 1: CPT

## 2019-05-16 PROCEDURE — 82948 REAGENT STRIP/BLOOD GLUCOSE: CPT

## 2019-05-16 PROCEDURE — 94660 CPAP INITIATION&MGMT: CPT

## 2019-05-16 PROCEDURE — 99221 1ST HOSP IP/OBS SF/LOW 40: CPT | Performed by: INTERNAL MEDICINE

## 2019-05-16 PROCEDURE — 85025 COMPLETE CBC W/AUTO DIFF WBC: CPT | Performed by: INTERNAL MEDICINE

## 2019-05-16 PROCEDURE — 99232 SBSQ HOSP IP/OBS MODERATE 35: CPT | Performed by: INTERNAL MEDICINE

## 2019-05-16 PROCEDURE — 80048 BASIC METABOLIC PNL TOTAL CA: CPT | Performed by: INTERNAL MEDICINE

## 2019-05-16 RX ORDER — FUROSEMIDE 10 MG/ML
80 INJECTION INTRAMUSCULAR; INTRAVENOUS
Status: COMPLETED | OUTPATIENT
Start: 2019-05-16 | End: 2019-05-19

## 2019-05-16 RX ORDER — AMLODIPINE BESYLATE 5 MG/1
5 TABLET ORAL DAILY
Status: DISCONTINUED | OUTPATIENT
Start: 2019-05-16 | End: 2019-05-23

## 2019-05-16 RX ADMIN — POTASSIUM CHLORIDE 20 MEQ: 1500 TABLET, EXTENDED RELEASE ORAL at 08:19

## 2019-05-16 RX ADMIN — FUROSEMIDE 40 MG: 10 INJECTION, SOLUTION INTRAMUSCULAR; INTRAVENOUS at 08:19

## 2019-05-16 RX ADMIN — APIXABAN 5 MG: 5 TABLET, FILM COATED ORAL at 08:18

## 2019-05-16 RX ADMIN — APIXABAN 5 MG: 5 TABLET, FILM COATED ORAL at 19:58

## 2019-05-16 RX ADMIN — ALLOPURINOL 300 MG: 100 TABLET ORAL at 08:19

## 2019-05-16 RX ADMIN — INSULIN LISPRO 2 UNITS: 100 INJECTION, SOLUTION INTRAVENOUS; SUBCUTANEOUS at 11:36

## 2019-05-16 RX ADMIN — AMLODIPINE BESYLATE 5 MG: 5 TABLET ORAL at 11:00

## 2019-05-16 RX ADMIN — PRAVASTATIN SODIUM 20 MG: 20 TABLET ORAL at 16:01

## 2019-05-16 RX ADMIN — METOPROLOL TARTRATE 50 MG: 50 TABLET, FILM COATED ORAL at 08:20

## 2019-05-16 RX ADMIN — ACETAMINOPHEN 650 MG: 325 TABLET ORAL at 19:57

## 2019-05-16 RX ADMIN — METOPROLOL TARTRATE 50 MG: 50 TABLET, FILM COATED ORAL at 20:01

## 2019-05-16 RX ADMIN — FUROSEMIDE 80 MG: 10 INJECTION, SOLUTION INTRAMUSCULAR; INTRAVENOUS at 16:01

## 2019-05-16 RX ADMIN — SITAGLIPTIN 25 MG: 50 TABLET, FILM COATED ORAL at 08:20

## 2019-05-16 RX ADMIN — INSULIN LISPRO 1 UNITS: 100 INJECTION, SOLUTION INTRAVENOUS; SUBCUTANEOUS at 17:06

## 2019-05-16 RX ADMIN — TAMSULOSIN HYDROCHLORIDE 0.4 MG: 0.4 CAPSULE ORAL at 16:01

## 2019-05-16 NOTE — ASSESSMENT & PLAN NOTE
· Secondary to acute on chronic diastolic congestive heart failure, bilateral pleural effusion, atelectasis, valvular heart disease, recent pneumonia  · Patient required BiPAP in the ER and improved with IV Lasix  · Currently on O2 via nasal cannula at 5L (discharged on 2L prn on last admission)  · Wean down as tolerated

## 2019-05-16 NOTE — PROGRESS NOTES
Progress Note - Ezra Mcmillan 1955, 70 y o  male MRN: [id number]    Unit/Bed#: [facility] 2 [address] [facility] 222-02 Encounter: [id number]    Primary Care Provider: Romina Larsen DO   Date and time admitted to hospital: 5/15/2019 12:50 PM        * Acute respiratory failure with hypoxia [facility])  Assessment & Plan  · Secondary to acute on chronic diastolic congestive heart failure, bilateral pleural effusion, atelectasis, valvular heart disease, recent pneumonia  · Patient required BiPAP in the ER and improved with IV Lasix  · Currently on O2 via nasal cannula at 5L (discharged on 2L prn on last admission)  · Wean down as tolerated     Acute on chronic diastolic CHF (congestive heart failure) ([facility] )  Assessment & Plan  · With volume overload and possible flash pulmonary edema which precipitated his admission to the hospital    · Contributory factor is valvular heart disease  · Continue Lasix 40 mg IV b i d   · Continue metoprolol b i d   · Hold ARB for now due to recent dye load  · Formal Cardiology consult pending    Abnormal CT of the chest  Assessment & Plan  · With congestive heart failure and bilateral pleural effusion  · Infiltrates are likely from compression atelectasis  · Low suspicion for ongoing pneumonia  · He was treated on his last admission and procalcitonin is normal   · Continue Observation off antibiotics  · Supportive care for post pneumonia cough    Hyperkalemia  Assessment & Plan  Mild  Currently at 5 4 with upper limit of normal at 5 3  ARB on hold  Hold  Potassium dose today  This should improve with IV Lasix and kaliuresis  Labs in a m      Type 2 diabetes mellitus with chronic kidney disease, without long-term current use of insulin ([facility] )  Assessment & Plan  Lab Results   Component Value Date    HGBA1C 6 9 (H) 03/07/2019       Recent Labs     05/16/19  0817   POCGLU 143*       Blood Sugar Average: Last 72 hrs:    A1c at goal   Hold oral hypoglycemic agent  Substitute Januvia for Tradjenta while in the hospital  Continue on sliding scale insulin  (P) 143    PAF (paroxysmal atrial fibrillation) ([facility] )  Assessment & Plan  · Fully anticoagulated on Eliquis  · Status post permanent pacemaker      VTE Pharmacologic Prophylaxis:   Pharmacologic: Apixaban (Eliquis)  Mechanical VTE Prophylaxis in Place: Yes    Patient Centered Rounds: I have performed bedside rounds with nursing staff today  Discussions with Specialists or Other Care Team Provider: Dr Sonia Jaeger    Education and Discussions with Family / Patient: Spoke with wife Bam and gave update    Time Spent for Care: 20 minutes  More than 50% of total time spent on counseling and coordination of care as described above  Current Length of Stay: 1 day(s)    Current Patient Status: Inpatient   Certification Statement: The patient will continue to require additional inpatient hospital stay due to CHF and increased O2 requirement    Discharge Plan: not ready for dc    Code Status: Level 1 - Full Code      Subjective:   Patient admits feeling better compared to yesterday  Still on O2 at 5 L  Occasional cough with yellow sputum  No fever    Objective:     Vitals:   Temp (24hrs), Av 6 °F (37 °C), Min:98 °F (36 7 °C), Max:99 3 °F (37 4 °C)    Temp:  [98 °F (36 7 °C)-99 3 °F (37 4 °C)] 99 3 °F (37 4 °C)  HR:  [] 104  Resp:  [22-30] 22  BP: (124-184)/(58-77) 169/77  SpO2:  [93 %-97 %] 94 %  Body mass index is 30 91 kg/m²  Input and Output Summary (last 24 hours): Intake/Output Summary (Last 24 hours) at 2019 0912  Last data filed at 2019 0801  Gross per 24 hour   Intake 2050 ml   Output 3100 ml   Net -1050 ml       Physical Exam:     Physical Exam   Constitutional: He is oriented to person, place, and time  He appears well-developed  No distress  HENT:   Head: Normocephalic and atraumatic  Eyes: No scleral icterus  Neck: No JVD present  Cardiovascular: Normal rate  Exam reveals no gallop and no friction rub     Murmur heard   Pulmonary/Chest: Effort normal  No stridor  No respiratory distress  He has no wheezes  Faint crackles at the bases   Abdominal: Soft  He exhibits no distension  There is no tenderness  There is no guarding  Musculoskeletal: He exhibits edema  Plus two pitting edema on both legs   Neurological: He is alert and oriented to person, place, and time  Skin: Skin is warm and dry  He is not diaphoretic  Psychiatric: He has a normal mood and affect  His behavior is normal      Additional Data:     Labs:    Results from last 7 days   Lab Units 05/16/19  0539   WBC Thousand/uL 13 14*   HEMOGLOBIN g/dL 9 6*   HEMATOCRIT % 30 0*   PLATELETS Thousands/uL 342   NEUTROS PCT % 79*   LYMPHS PCT % 7*   MONOS PCT % 5   EOS PCT % 7*     Results from last 7 days   Lab Units 05/16/19  0018 05/15/19  1314   SODIUM mmol/L 137 134*   POTASSIUM mmol/L 5 4* 5 3   CHLORIDE mmol/L 106 101   CO2 mmol/L 23 17*   BUN mg/dL 46* 51*   CREATININE mg/dL 1 31* 1 56*   ANION GAP mmol/L 8 16*   CALCIUM mg/dL 9 5 9 8   ALBUMIN g/dL  --  3 5   TOTAL BILIRUBIN mg/dL  --  0 65   ALK PHOS U/L  --  109   ALT U/L  --  29   AST U/L  --  26   GLUCOSE RANDOM mg/dL 109 274*     Results from last 7 days   Lab Units 05/15/19  1314   INR  1 12     Results from last 7 days   Lab Units 05/16/19  0817   POC GLUCOSE mg/dl 143*         Results from last 7 days   Lab Units 05/15/19  1514 05/15/19  1314   LACTIC ACID mmol/L 1 4 3 0*   PROCALCITONIN ng/ml  --  <0 05           * I Have Reviewed All Lab Data Listed Above  * Additional Pertinent Lab Tests Reviewed:  All Labs Within Last 24 Hours Reviewed    Imaging:    Imaging Reports Reviewed Today Include:  None  Recent Cultures (last 7 days):           Last 24 Hours Medication List:     Current Facility-Administered Medications:  acetaminophen 650 mg Oral Q6H PRN Eduar MOE Taylor   allopurinol 300 mg Oral QAM Clara Staley MD   apixaban 5 mg Oral Q12H Clara Staley MD   furosemide 40 mg Intravenous BID (diuretic) Tomy Bird MD   insulin lispro 1-5 Units Subcutaneous TID [facility] Tomy Bird MD   lidocaine 1 patch Topical Daily Lam Meza PA-C   metoprolol tartrate 50 mg Oral Q12H [address] Tomy Bird MD   pravastatin 20 mg Oral Daily With Christy Kahn MD   sitaGLIPtin 25 mg Oral Daily Tomy Bird MD   tamsulosin 0 4 mg Oral Daily With Christy Kahn MD        Today, Patient Was Seen By: Tomy Bird MD    ** Please Note: Dictation voice to text software may have been used in the creation of this document   **

## 2019-05-16 NOTE — PLAN OF CARE
Problem: Potential for Falls  Goal: Patient will remain free of falls  Description  INTERVENTIONS:  - Assess patient frequently for physical needs  -  Identify cognitive and physical deficits and behaviors that affect risk of falls    -  [place] fall precautions as indicated by assessment   - Educate patient/family on patient safety including physical limitations  - Instruct patient to call for assistance with activity based on assessment  - Modify environment to reduce risk of injury  - Consider OT/PT consult to assist with strengthening/mobility  Outcome: Progressing     Problem: PAIN - ADULT  Goal: Verbalizes/displays adequate comfort level or baseline comfort level  Description  Interventions:  - Encourage patient to monitor pain and request assistance  - Assess pain using appropriate pain scale  - Administer analgesics based on type and severity of pain and evaluate response  - Implement non-pharmacological measures as appropriate and evaluate response  - Consider cultural and social influences on pain and pain management  - Notify physician/advanced practitioner if interventions unsuccessful or patient reports new pain  Outcome: Progressing     Problem: DISCHARGE PLANNING  Goal: Discharge to home or other facility with appropriate resources  Description  INTERVENTIONS:  - Identify barriers to discharge w/patient and caregiver  - Arrange for needed discharge resources and transportation as appropriate  - Identify discharge learning needs (meds, wound care, etc )  - Arrange for interpretive services to assist at discharge as needed  - Refer to Case Management Department for coordinating discharge planning if the patient needs post-hospital services based on physician/advanced practitioner order or complex needs related to functional status, cognitive ability, or social support system  Outcome: Progressing     Problem: CARDIOVASCULAR - ADULT  Goal: Maintains optimal cardiac output and hemodynamic stability  Description  INTERVENTIONS:  - Monitor I/O, vital signs and rhythm  - Monitor for S/S and trends of decreased cardiac output i e  bleeding, hypotension  - Administer and titrate ordered vasoactive medications to optimize hemodynamic stability  - Assess quality of pulses, skin color and temperature  - Assess for signs of decreased coronary artery perfusion - ex   Angina  - Instruct patient to report change in severity of symptoms  Outcome: Progressing  Goal: Absence of cardiac dysrhythmias or at baseline rhythm  Description  INTERVENTIONS:  - Continuous cardiac monitoring, monitor vital signs, obtain 12 lead EKG if indicated  - Administer antiarrhythmic and heart rate control medications as ordered  - Monitor electrolytes and administer replacement therapy as ordered  Outcome: Progressing     Problem: RESPIRATORY - ADULT  Goal: Achieves optimal ventilation and oxygenation  Description  INTERVENTIONS:  - Assess for changes in respiratory status  - Assess for changes in mentation and behavior  - Position to facilitate oxygenation and minimize respiratory effort  - Oxygen administration by appropriate delivery method based on oxygen saturation (per order) or ABGs  - Initiate smoking cessation education as indicated  - Encourage broncho-pulmonary hygiene including cough, deep breathe, Incentive Spirometry  - Assess the need for suctioning and aspirate as needed  - Assess and instruct to report SOB or any respiratory difficulty  - Respiratory Therapy support as indicated  Outcome: Progressing

## 2019-05-16 NOTE — PLAN OF CARE
Problem: Potential for Falls  Goal: Patient will remain free of falls  Description  INTERVENTIONS:  - Assess patient frequently for physical needs  -  Identify cognitive and physical deficits and behaviors that affect risk of falls    -  [place] fall precautions as indicated by assessment   - Educate patient/family on patient safety including physical limitations  - Instruct patient to call for assistance with activity based on assessment  - Modify environment to reduce risk of injury  - Consider OT/PT consult to assist with strengthening/mobility  Outcome: Progressing     Problem: PAIN - ADULT  Goal: Verbalizes/displays adequate comfort level or baseline comfort level  Description  Interventions:  - Encourage patient to monitor pain and request assistance  - Assess pain using appropriate pain scale  - Administer analgesics based on type and severity of pain and evaluate response  - Implement non-pharmacological measures as appropriate and evaluate response  - Consider cultural and social influences on pain and pain management  - Notify physician/advanced practitioner if interventions unsuccessful or patient reports new pain  Outcome: Progressing     Problem: DISCHARGE PLANNING  Goal: Discharge to home or other facility with appropriate resources  Description  INTERVENTIONS:  - Identify barriers to discharge w/patient and caregiver  - Arrange for needed discharge resources and transportation as appropriate  - Identify discharge learning needs (meds, wound care, etc )  - Arrange for interpretive services to assist at discharge as needed  - Refer to Case Management Department for coordinating discharge planning if the patient needs post-hospital services based on physician/advanced practitioner order or complex needs related to functional status, cognitive ability, or social support system  Outcome: Progressing     Problem: CARDIOVASCULAR - ADULT  Goal: Maintains optimal cardiac output and hemodynamic stability  Description  INTERVENTIONS:  - Monitor I/O, vital signs and rhythm  - Monitor for S/S and trends of decreased cardiac output i e  bleeding, hypotension  - Administer and titrate ordered vasoactive medications to optimize hemodynamic stability  - Assess quality of pulses, skin color and temperature  - Assess for signs of decreased coronary artery perfusion - ex   Angina  - Instruct patient to report change in severity of symptoms  Outcome: Progressing  Goal: Absence of cardiac dysrhythmias or at baseline rhythm  Description  INTERVENTIONS:  - Continuous cardiac monitoring, monitor vital signs, obtain 12 lead EKG if indicated  - Administer antiarrhythmic and heart rate control medications as ordered  - Monitor electrolytes and administer replacement therapy as ordered  Outcome: Progressing     Problem: RESPIRATORY - ADULT  Goal: Achieves optimal ventilation and oxygenation  Description  INTERVENTIONS:  - Assess for changes in respiratory status  - Assess for changes in mentation and behavior  - Position to facilitate oxygenation and minimize respiratory effort  - Oxygen administration by appropriate delivery method based on oxygen saturation (per order) or ABGs  - Initiate smoking cessation education as indicated  - Encourage broncho-pulmonary hygiene including cough, deep breathe, Incentive Spirometry  - Assess the need for suctioning and aspirate as needed  - Assess and instruct to report SOB or any respiratory difficulty  - Respiratory Therapy support as indicated  Outcome: Progressing

## 2019-05-16 NOTE — ASSESSMENT & PLAN NOTE
· With volume overload and possible flash pulmonary edema which precipitated his admission to the hospital    · Contributory factor is valvular heart disease  · Continue Lasix 40 mg IV b i d   · Continue metoprolol b i d   · Hold ARB for now due to recent dye load    · Formal Cardiology consult pending

## 2019-05-16 NOTE — UTILIZATION REVIEW
Initial Clinical Review    Admission: Date/Time/Statement: 5/15/19 @ 1651   Orders Placed This Encounter   Procedures    Inpatient Admission     Standing Status:   Standing     Number of Occurrences:   1     Order Specific Question:   Admitting Physician     Answer:   Brice Matson [[id number]]     Order Specific Question:   Level of Care     Answer:   Med Surg [[id number]]     Order Specific Question:   Estimated length of stay     Answer:   More than 2 Midnights     Order Specific Question:   Certification     Answer:   I certify that inpatient services are medically necessary for this patient for a duration of greater than two midnights  See H&P and MD Progress Notes for additional information about the patient's course of treatment  ED: Date/Time/Mode of Arrival:   ED Arrival Information     Expected Arrival Acuity Means of Arrival Escorted By Service Admission Type    - 5/15/2019 12:50 [facility] General Medicine Emergency    Arrival Complaint    -        Chief Complaint:   Chief Complaint   Patient presents with    Respiratory Distress     Pt arrived via ems with sudden onset sob beginning about 45 minutes ago  Pt satting 75% on room air  D/c from hospital 1 week ago with pneumonia  Hx of chf      Assessment/Plan: 61 yo male presents to ED via EMS with sudden onset SOB  Per EMS  Sat 75% on RA  On NRB on arrival to ED, tachypneic & tachycardia  Recent admit for CHF & pneumonia May 1-May 8, 2019  He was discharged on oral lasix and abxs to complete tx and O2 which he states he has not needed since he got home   Required Bipap in ED  Acute respiratory failure with hypoxia (HCC)  · Secondary to acute on chronic diastolic congestive heart failure, bilateral pleural effusion, atelectasis, valvular heart disease, recent pneumonia  · Patient required BiPAP in the ER and improved with IV Lasix  · Currently on O2 via nasal cannula  · Wean down as tolerated     Acute on chronic diastolic CHF (congestive heart failure) (HCC)  · With volume overload and possible flash pulmonary edema which precipitated his admission to the hospital    · Continue Lasix 40 mg IV b i d   · Continue metoprolol b i d   · Hold ARB for now due to recent dye load  · Consult Cardiology     Abnormal CT of the chest  · With congestive heart failure and bilateral pleural effusion  · Infiltrates are likely from compression atelectasis  · Low suspicion for ongoing pneumonia  · He was treated on his last admission and procalcitonin is normal   · Observe off antibiotics    Anticipated Length of Stay:  Patient will be admitted on an Inpatient basis with an anticipated length of stay of  At least 2 midnights  Justification for Hospital Stay: chf     ·   ED Vital Signs:   ED Triage Vitals   Temperature Pulse Respirations Blood Pressure SpO2   05/15/19 1257 05/15/19 1255 05/15/19 1255 05/15/19 1255 05/15/19 1255   98 °F (36 7 °C) (!) 110 (!) 26 (!) 175/63 95 %      Temp Source Heart Rate Source Patient Position - Orthostatic VS BP Location FiO2 (%)   05/15/19 1257 05/15/19 1255 05/15/19 1255 05/15/19 1255 --   Oral Monitor Lying Right arm       Pain Score       05/15/19 1255       No Pain        Wt Readings from Last 1 Encounters:   05/16/19 97 7 kg (215 lb 6 2 oz)     Vital Signs (abnormal):   05/15/19 1437 -- 111 30 154/64 95 % Bipap    05/15/19 1416 -- 118 26 184/68 93 % Bipap    05/15/19 1337 -- 97 26 143/63 97 %     05/15/19 1257 98 °F (36 7 °C) -- -- -- --     05/15/19 1255 -- 110 26 175/63  NRB        Pertinent Labs/Diagnostic Test Results:   Procalcitonin < 0 05,     Lactic acid  3 0,   1 4  NTproBNP 527  WBC's 15 17,  H&H 11 / 34 2,  P[lats 400  BC's pending  ,  Cl 101,  BUN 51,  Cr 1 56,  Glu 274,  CO2  17    CTA Chest: No evidence of pulmonary embolism    Moderate bilateral pleural effusions, left greater than right   Adjacent consolidations within the posterior lower lobe suggestive of atelectasis   Superimposed infection not excluded  Mild pulmonary edema   Mediastinal and hilar adenopathy may be reactive to pulmonary edema or less likely infection    EKG: Ventricular-paced rhythm      ED Treatment:   Medication Administration from 05/15/2019 1250 to 05/15/2019 1937       Date/Time Order Dose Route Action Action by Comments     05/15/2019 1435 sodium chloride 0 9 % bolus 1,000 mL 0 mL Intravenous Stopped       05/15/2019 1317 sodium chloride 0 9 % bolus 1,000 mL 1,000 mL Intravenous New Bag       05/15/2019 1534 cefepime (MAXIPIME) 2 g/50 mL dextrose IVPB 0 mg Intravenous Stopped       05/15/2019 1434 cefepime (MAXIPIME) 2 g/50 mL dextrose IVPB 2,000 mg Intravenous New Bag       05/15/2019 1414 iodixanol (VISIPAQUE) 320 MG/ML injection 85 mL 85 mL Intravenous Given       05/15/2019 1438 furosemide (LASIX) injection 40 mg 40 mg Intravenous Given          Past Medical/Surgical History:   Past Medical History:   Diagnosis Date    Arthritis     Bruise of both arms     Bruises easily     [facility] [facility]     Diabetes mellitus ([facility]     Erectile dysfunction     Hypertension     Murmur     [facility]peripheral [facility] disease) [facility]     Seasonal allergies     Toe infection     Walks frequently     Wears dentures     Wears glasses      Admitting Diagnosis: Acute respiratory distress [R06 03]  CHF (congestive heart failure) ([facility]) [I50 9]  Respiratory distress [R06 03]  Bilateral pleural effusion [J90]  Age/Sex: 70 y o  male     Admission Orders: IP  Tele  EKG with CP or ST changes  Consult Cardio  CPAp @ hs  SCD's  CBC, BMP in am      Scheduled Meds:   Current Facility-Administered Medications:  acetaminophen 650 mg Oral Q6H PRN    allopurinol 300 mg Oral QAM    amLODIPine 5 mg Oral Daily    apixaban 5 mg Oral Q12H    furosemide 40 mg Intravenous BID (diuretic)    insulin lispro 1-5 Units Subcutaneous TID AC    lidocaine 1 patch Topical Daily    metoprolol tartrate 50 mg Oral Q12H [facility]    pravastatin 20 mg Oral Daily With Dinner    sitaGLIPtin 25 mg Oral Daily    tamsulosin 0 4 mg Oral Daily With Dinner      5/16:  Increased Lasix to 80 IV bid    Per Cardio: 1  Acute pulmonary edema  Rather dramatic weight gain and acute decompensation which happened 2 weeks ago  Reviewed the echo from last admission in do feel mitral stenosis is perhaps severe and responsible for patient's acute decompensation  It is less likely that coronary artery disease is causing this especially since he has presented with normal troponins when he is in extremis  Less likely would be renal artery stenosis  Apparently he did not have renal artery ultrasound last admission  Continue with IV diuresis but will enhance furosemide 80 mg IV b i d     Watch renal function closely as we diurese patient-plan for cardiac catheterization early next week after patient has been diuresed  He will need both right and left heart catheterizations to better quantify mitral stenosis  We will check his coronary arteries at that time  Also we should do some imaging of his renal arteries to make sure he does not have renal artery stenosis contributing to acute pulmonary edema    Network Utilization Review Department  Phone: [phone]; Fax [phone]  [email or web address]  ATTENTION: Please call with any questions or concerns to [phone]  and carefully listen to the prompts so that you are directed to the right person  Send all requests for admission clinical reviews, approved or denied determinations and any other requests to fax [phone]   All voicemails are confidential

## 2019-05-16 NOTE — CONSULTS
Consult - Cardiology   Fabian Hunter 70 y o  male MRN: [id number]  Unit/Bed#: [place] [address] 222-02 Encounter: [id number]          Reason For Consult:   Acute pulmonary edema    History Of Present Illness:  68-year-old male with a history of obesity, diabetes mellitus and hypertension as well as hyperlipidemia  He presented to the hospital in November last year with shock syndrome related to complete heart block and bradycardia  He received a permanent pacemaker and had a rather protracted hospitalization  He was in atrial fibrillation but did return to sinus rhythm with recovery  He was sent home on systemic anticoagulation  He did have market fluid overload which was corrected prior to discharge  His echo at that time showed mild mitral stenosis and mild aortic stenosis although the degree of mitral stenosis was not quantified well  He did fairly well after that on furosemide 40 mg daily  He did increase the furosemide on his own in March since he was noting weight increase and edema  He was admitted to the hospital earlier this month with acute decompensation represented by shortness of breath  He was admitted to the hospital on May 1st   He was felt both to have pneumonia and acute congestive heart failure  He did have treatment both for the pneumonia and heart failure  He was ultimately discharged on 05/08/2019 with a weight of 208 lb  He did well for several days at home with a fairly stable weight  He states he perhaps did too much meaning doing some housework  Yesterday he had the abrupt onset of shortness of breath again and came to the emergency room  He was found to have a rather abrupt weight gain of several lb over the past 24 hours  He has been compliant with his diet  He had no chest pain  He did have increased edema from baseline  He denies palpitations or lightheadedness          Past Medical History:   Diabetes  Hypertension  Hyperlipidemia  Peripheral vascular disease with 2 digits amputated from his feet  Complete heart block status post permanent pacemaker in November last year  Moderate mitral stenosis on echo recently  Mild aortic stenosis on echo  Gout  Obesity  Chronic lower extremity edema  Chronic PVCs  History of arthroscopic surgeries  BPH  Shoulder surgery  Carpal tunnel syndrome surgery    Known allergies:  Lamisil, latex and Invokana    Medications at home:  Allopurinol  Amlodipine 10 mg daily  Eliquis 5 mg b i d  Vitamin D3  Furosemide 40 mg b i d   Glimepiride  Hydroxy is seen  Insulin  Losartan 75 mg daily  Metformin  Metoprolol tartrate 50 mg q 12 hours  Multivitamins  Simvastatin 20 mg daily  Flomax  Tradjenta  KCl 20 mEq daily        Allergy:  Allergies   Allergen Reactions    Invokana [Canagliflozin]     Lamisil [Terbinafine] Rash    Lamisil [Terbinafine] Blisters     Wife states " His skin peeled from head to toe"    Other Swelling     Pomegranate - facial swelling, no swelling of tongue, esophagus  Adhesive tape   Latex Rash         Family History:  Heart disease, hypertension and pulmonary embolism    Social History: Former smoker  No alcohol use        ROS:  Review of Systems - remarkable for above noted weight gain  Appetite fair  Feels cold but no obvious fever  No fatigue  Does have frequent urination without dysuria, urgency or hematuria  Denies constipation diarrhea passage of blood per stool or abdominal pain  Denies headaches, speech difficulty, syncope or visual disturbances  Denies cough or wheezing  Denies depression or anxiety  Denies confusion denies skin rash or lesions    Denies easy bruising    Exam:  Vitals:    05/15/19 2056 05/15/19 2300 05/16/19 0545 05/16/19 0727   BP: 162/76 124/58  169/77   BP Location: Left leg Left arm  Left arm   Pulse: 93 64  104   Resp: 22 22  22   Temp: 98 2 °F (36 8 °C) 98 9 °F (37 2 °C)  99 3 °F (37 4 °C)   TempSrc: Temporal Temporal  Temporal   SpO2:  96%  94%   Weight:   97 7 kg (215 lb 6 2 oz) General:  Obese middle-age white male perhaps mild dyspnea on oxygen  Head: Normocephalic, atraumatic  Eyes:  No Icterus  Pale Conjunctiva  Oropharynx: normal-appearing mucosa and no pharyngitis, no exudate  Neck: supple, symmetrical, trachea midline and no JVD carotids +2 without bruits  Heart: RRR, grade 2 systolic murmur at the base  I cannot hear a diastolic murmur  No gallop or rub noted  Lungs:  Breath sounds at the bases with bibasilar rales noted  No wheezing or rhonchi  Abdomen: obese, normal findings: no masses palpable, no organomegaly and soft, non-tender  Lower Limbs:  2 to 3+ lower extremity edema with intact distal pulses  Psychiatric:  Mood normal   Oriented  No confusion noted    White blood count 13 14, hemoglobin 9 6, platelets  661,066  BUN 46, creatinine 1 31, potassium 5 4  Troponin negative  ProBNP 527  INR 1 12  CT of the chest shows no pulmonary embolism with moderate bilateral effusions and perhaps left lower lobe atelectasis  Pulmonary edema suggested  EKG-ventricular paced rhythm    ASSESSMENT AND PLAN:   1  Acute pulmonary edema  Rather dramatic weight gain and acute decompensation which happened 2 weeks ago  Reviewed the echo from last admission in do feel mitral stenosis is perhaps severe and responsible for patient's acute decompensation  It is less likely that coronary artery disease is causing this especially since he has presented with normal troponins when he is in extremis  Less likely would be renal artery stenosis  Apparently he did not have renal artery ultrasound last admission  Continue with IV diuresis but will enhance furosemide 80 mg IV b i d     Watch renal function closely as we diurese patient-plan for cardiac catheterization early next week after patient has been diuresed  He will need both right and left heart catheterizations to better quantify mitral stenosis  We will check his coronary arteries at that time    Also we should do some imaging of his renal arteries to make sure he does not have renal artery stenosis contributing to acute pulmonary edema  2  Paroxysmal atrial fibrillation during acute illness last year  No apparent recurrence  Continue Eliquis for now  This actually may be valvular atrial fibrillation but will continue Eliquis at this time  Will stop at the appropriate time for cardiac catheterization  Continue beta-blocker for potential rate control  3  Hypertension  Blood pressure elevated at this time  Patient on beta-blocker  Will restart patient's amlodipine  This was not ordered on admission for some reason  4  Hyperlipidemia  Continue statin therapy  5  Complete heart block presenting with shock in November last year status post permanent pacemaker  Device has been functioning normally  No apparent evidence for atrial fibrillation as a cause of acute decompensation      Will follow with you and advise further    Scout Huntley MD

## 2019-05-16 NOTE — ASSESSMENT & PLAN NOTE
Lab Results   Component Value Date    HGBA1C 6 9 (H) 03/07/2019       Recent Labs     05/16/19  0817   POCGLU 143*       Blood Sugar Average: Last 72 hrs:    A1c at goal   Hold oral hypoglycemic agent  Substitute Januvia for Tradjenta while in the hospital  Continue on sliding scale insulin  (P) 143

## 2019-05-16 NOTE — ASSESSMENT & PLAN NOTE
· With congestive heart failure and bilateral pleural effusion  · Infiltrates are likely from compression atelectasis  · Low suspicion for ongoing pneumonia  · He was treated on his last admission and procalcitonin is normal   · Continue Observation off antibiotics  · Supportive care for post pneumonia cough

## 2019-05-17 PROBLEM — E87.5 HYPERKALEMIA: Status: RESOLVED | Noted: 2019-05-16 | Resolved: 2019-05-17

## 2019-05-17 LAB
ANION GAP SERPL CALCULATED.3IONS-SCNC: 10 MMOL/L (ref 4–13)
BASOPHILS # BLD AUTO: 0.08 THOUSANDS/ΜL (ref 0–0.1)
BASOPHILS NFR BLD AUTO: 1 % (ref 0–1)
BUN SERPL-MCNC: 38 MG/DL (ref 5–25)
CALCIUM SERPL-MCNC: 9.8 MG/DL (ref 8.3–10.1)
CHLORIDE SERPL-SCNC: 106 MMOL/L (ref 100–108)
CO2 SERPL-SCNC: 23 MMOL/L (ref 21–32)
CREAT SERPL-MCNC: 1.26 MG/DL (ref 0.6–1.3)
EOSINOPHIL # BLD AUTO: 1.5 THOUSAND/ΜL (ref 0–0.61)
EOSINOPHIL NFR BLD AUTO: 14 % (ref 0–6)
ERYTHROCYTE [DISTWIDTH] IN BLOOD BY AUTOMATED COUNT: 15.4 % (ref 11.6–15.1)
GFR SERPL CREATININE-BSD FRML MDRD: 59 ML/MIN/1.73SQ M
GLUCOSE SERPL-MCNC: 102 MG/DL (ref 65–140)
GLUCOSE SERPL-MCNC: 109 MG/DL (ref 65–140)
GLUCOSE SERPL-MCNC: 168 MG/DL (ref 65–140)
GLUCOSE SERPL-MCNC: 242 MG/DL (ref 65–140)
HCT VFR BLD AUTO: 31.9 % (ref 36.5–49.3)
HGB BLD-MCNC: 10.2 G/DL (ref 12–17)
IMM GRANULOCYTES # BLD AUTO: 0.03 THOUSAND/UL (ref 0–0.2)
IMM GRANULOCYTES NFR BLD AUTO: 0 % (ref 0–2)
LYMPHOCYTES # BLD AUTO: 1.02 THOUSANDS/ΜL (ref 0.6–4.47)
LYMPHOCYTES NFR BLD AUTO: 9 % (ref 14–44)
MCH RBC QN AUTO: 29.7 PG (ref 26.8–34.3)
MCHC RBC AUTO-ENTMCNC: 32 G/DL (ref 31.4–37.4)
MCV RBC AUTO: 93 FL (ref 82–98)
MONOCYTES # BLD AUTO: 0.65 THOUSAND/ΜL (ref 0.17–1.22)
MONOCYTES NFR BLD AUTO: 6 % (ref 4–12)
NEUTROPHILS # BLD AUTO: 7.79 THOUSANDS/ΜL (ref 1.85–7.62)
NEUTS SEG NFR BLD AUTO: 70 % (ref 43–75)
NRBC BLD AUTO-RTO: 0 /100 WBCS
PLATELET # BLD AUTO: 337 THOUSANDS/UL (ref 149–390)
PMV BLD AUTO: 9.6 FL (ref 8.9–12.7)
POTASSIUM SERPL-SCNC: 4.8 MMOL/L (ref 3.5–5.3)
RBC # BLD AUTO: 3.43 MILLION/UL (ref 3.88–5.62)
SODIUM SERPL-SCNC: 139 MMOL/L (ref 136–145)
WBC # BLD AUTO: 11.07 THOUSAND/UL (ref 4.31–10.16)

## 2019-05-17 PROCEDURE — 94660 CPAP INITIATION&MGMT: CPT

## 2019-05-17 PROCEDURE — 94760 N-INVAS EAR/PLS OXIMETRY 1: CPT

## 2019-05-17 PROCEDURE — 85025 COMPLETE CBC W/AUTO DIFF WBC: CPT | Performed by: INTERNAL MEDICINE

## 2019-05-17 PROCEDURE — 99232 SBSQ HOSP IP/OBS MODERATE 35: CPT | Performed by: INTERNAL MEDICINE

## 2019-05-17 PROCEDURE — B2111ZZ FLUOROSCOPY OF MULTIPLE CORONARY ARTERIES USING LOW OSMOLAR CONTRAST: ICD-10-PCS | Performed by: INTERNAL MEDICINE

## 2019-05-17 PROCEDURE — 4A023N7 MEASUREMENT OF CARDIAC SAMPLING AND PRESSURE, LEFT HEART, PERCUTANEOUS APPROACH: ICD-10-PCS | Performed by: INTERNAL MEDICINE

## 2019-05-17 PROCEDURE — 80048 BASIC METABOLIC PNL TOTAL CA: CPT | Performed by: INTERNAL MEDICINE

## 2019-05-17 PROCEDURE — 82948 REAGENT STRIP/BLOOD GLUCOSE: CPT

## 2019-05-17 RX ADMIN — FUROSEMIDE 80 MG: 10 INJECTION, SOLUTION INTRAMUSCULAR; INTRAVENOUS at 16:57

## 2019-05-17 RX ADMIN — INSULIN LISPRO 2 UNITS: 100 INJECTION, SOLUTION INTRAVENOUS; SUBCUTANEOUS at 12:29

## 2019-05-17 RX ADMIN — FUROSEMIDE 80 MG: 10 INJECTION, SOLUTION INTRAMUSCULAR; INTRAVENOUS at 08:48

## 2019-05-17 RX ADMIN — PRAVASTATIN SODIUM 20 MG: 20 TABLET ORAL at 16:55

## 2019-05-17 RX ADMIN — APIXABAN 5 MG: 5 TABLET, FILM COATED ORAL at 08:48

## 2019-05-17 RX ADMIN — METOPROLOL TARTRATE 50 MG: 50 TABLET, FILM COATED ORAL at 21:37

## 2019-05-17 RX ADMIN — METOPROLOL TARTRATE 50 MG: 50 TABLET, FILM COATED ORAL at 08:47

## 2019-05-17 RX ADMIN — INSULIN LISPRO 1 UNITS: 100 INJECTION, SOLUTION INTRAVENOUS; SUBCUTANEOUS at 16:58

## 2019-05-17 RX ADMIN — APIXABAN 5 MG: 5 TABLET, FILM COATED ORAL at 21:36

## 2019-05-17 RX ADMIN — SITAGLIPTIN 25 MG: 50 TABLET, FILM COATED ORAL at 08:48

## 2019-05-17 RX ADMIN — TAMSULOSIN HYDROCHLORIDE 0.4 MG: 0.4 CAPSULE ORAL at 16:55

## 2019-05-17 RX ADMIN — ALLOPURINOL 300 MG: 100 TABLET ORAL at 08:47

## 2019-05-17 RX ADMIN — AMLODIPINE BESYLATE 5 MG: 5 TABLET ORAL at 08:47

## 2019-05-17 NOTE — PLAN OF CARE
Problem: Potential for Falls  Goal: Patient will remain free of falls  Description  INTERVENTIONS:  - Assess patient frequently for physical needs  -  Identify cognitive and physical deficits and behaviors that affect risk of falls    -  [place] fall precautions as indicated by assessment   - Educate patient/family on patient safety including physical limitations  - Instruct patient to call for assistance with activity based on assessment  - Modify environment to reduce risk of injury  - Consider OT/PT consult to assist with strengthening/mobility  Outcome: Progressing     Problem: PAIN - ADULT  Goal: Verbalizes/displays adequate comfort level or baseline comfort level  Description  Interventions:  - Encourage patient to monitor pain and request assistance  - Assess pain using appropriate pain scale  - Administer analgesics based on type and severity of pain and evaluate response  - Implement non-pharmacological measures as appropriate and evaluate response  - Consider cultural and social influences on pain and pain management  - Notify physician/advanced practitioner if interventions unsuccessful or patient reports new pain  Outcome: Progressing     Problem: DISCHARGE PLANNING  Goal: Discharge to home or other facility with appropriate resources  Description  INTERVENTIONS:  - Identify barriers to discharge w/patient and caregiver  - Arrange for needed discharge resources and transportation as appropriate  - Identify discharge learning needs (meds, wound care, etc )  - Arrange for interpretive services to assist at discharge as needed  - Refer to Case Management Department for coordinating discharge planning if the patient needs post-hospital services based on physician/advanced practitioner order or complex needs related to functional status, cognitive ability, or social support system  Outcome: Progressing     Problem: CARDIOVASCULAR - ADULT  Goal: Maintains optimal cardiac output and hemodynamic stability  Description  INTERVENTIONS:  - Monitor I/O, vital signs and rhythm  - Monitor for S/S and trends of decreased cardiac output i e  bleeding, hypotension  - Administer and titrate ordered vasoactive medications to optimize hemodynamic stability  - Assess quality of pulses, skin color and temperature  - Assess for signs of decreased coronary artery perfusion - ex   Angina  - Instruct patient to report change in severity of symptoms  Outcome: Progressing  Goal: Absence of cardiac dysrhythmias or at baseline rhythm  Description  INTERVENTIONS:  - Continuous cardiac monitoring, monitor vital signs, obtain 12 lead EKG if indicated  - Administer antiarrhythmic and heart rate control medications as ordered  - Monitor electrolytes and administer replacement therapy as ordered  Outcome: Progressing     Problem: RESPIRATORY - ADULT  Goal: Achieves optimal ventilation and oxygenation  Description  INTERVENTIONS:  - Assess for changes in respiratory status  - Assess for changes in mentation and behavior  - Position to facilitate oxygenation and minimize respiratory effort  - Oxygen administration by appropriate delivery method based on oxygen saturation (per order) or ABGs  - Initiate smoking cessation education as indicated  - Encourage broncho-pulmonary hygiene including cough, deep breathe, Incentive Spirometry  - Assess the need for suctioning and aspirate as needed  - Assess and instruct to report SOB or any respiratory difficulty  - Respiratory Therapy support as indicated  Outcome: Progressing

## 2019-05-17 NOTE — ASSESSMENT & PLAN NOTE
· With volume overload and possible flash pulmonary edema which precipitated his admission to the hospital    · Contributory factor is valvular heart disease  · Continue Lasix 40 mg IV b i d   · Continue metoprolol b i d   · Hold ARB for now due to recent dye load    · Cardiology recommendations reviewed  · Plan for left and right cardiac catheterization next week

## 2019-05-17 NOTE — ASSESSMENT & PLAN NOTE
· Secondary to acute on chronic diastolic congestive heart failure, bilateral pleural effusion, atelectasis, valvular heart disease, recent pneumonia  · Patient required BiPAP in the ER on 5/15 and improved with IV Lasix  · Currently on O2 via nasal cannula   · Wean down as tolerated

## 2019-05-17 NOTE — ASSESSMENT & PLAN NOTE
Lab Results   Component Value Date    HGBA1C 6 9 (H) 03/07/2019       Recent Labs     05/16/19  1059 05/16/19  1613 05/16/19 2052 05/17/19  0708   POCGLU 240* 155* 150* 109       Blood Sugar Average: Last 72 hrs:    A1c at goal   Hold oral hypoglycemic agent  Substitute Januvia for Tradjenta while in the hospital  Continue on sliding scale insulin  (P) 159 4

## 2019-05-17 NOTE — PLAN OF CARE
Problem: Potential for Falls  Goal: Patient will remain free of falls  Description  INTERVENTIONS:  - Assess patient frequently for physical needs  -  Identify cognitive and physical deficits and behaviors that affect risk of falls    -  [place] fall precautions as indicated by assessment   - Educate patient/family on patient safety including physical limitations  - Instruct patient to call for assistance with activity based on assessment  - Modify environment to reduce risk of injury  - Consider OT/PT consult to assist with strengthening/mobility  Outcome: Progressing

## 2019-05-17 NOTE — NURSING NOTE
Received report from [place], [address] [place] [address] from [place] [facility]  Patient transferred to E4 for Cardiac Cath planned for Monday  Patient arrived via Wheelchair on 3L NC 94%  Placed on Tele in [place] ANNIE Borjas with [employer]  Will give report to next shift

## 2019-05-17 NOTE — ASSESSMENT & PLAN NOTE
· With mildly elevated creatinine of 1 56 on admission  · Acute renal failure resolved  · Creatinine now at baseline at 1 26

## 2019-05-17 NOTE — PROGRESS NOTES
Progress Note - Zoie Marti 70 y o  male MRN: [id number]    Unit/Bed#: [facility] [address] 2 [address] 222-02 Encounter: [id number]  Subjective:   Breathing easier    No chest pain    Edema better  No palpitations or lightheadedness  Objective:   Vitals: Blood pressure 159/67, pulse 86, temperature 97 8 °F (36 6 °C), temperature source Tympanic, resp  rate 19, weight 97 5 kg (214 lb 15 2 oz), SpO2 97 %  ,Body mass index is 30 84 kg/m²  CBC with diff:   Results from last 7 days   Lab Units 05/17/19  0553   WBC Thousand/uL 11 07*   RBC Million/uL 3 43*   HEMOGLOBIN g/dL 10 2*   HEMATOCRIT % 31 9*   MCV fL 93   MCH pg 29 7   MCHC g/dL 32 0   RDW % 15 4*   MPV fL 9 6   PLATELETS Thousands/uL 337     CMP:   Results from last 7 days   Lab Units 05/17/19  0553  05/15/19  1314   SODIUM mmol/L 139   < > 134*   POTASSIUM mmol/L 4 8   < > 5 3   CHLORIDE mmol/L 106   < > 101   CO2 mmol/L 23   < > 17*   BUN mg/dL 38*   < > 51*   CREATININE mg/dL 1 26   < > 1 56*   CALCIUM mg/dL 9 8   < > 9 8   AST U/L  --   --  26   ALT U/L  --   --  29   ALK PHOS U/L  --   --  109   EGFR ml/min/1 73sq m 59   < > 46    < > = values in this interval not displayed  Physical exam:  Lungs-decreased breath sounds at the bases bilaterally  No rales or rhonchi  Heart-regular grade 2-3 systolic murmur at the base  Abdomen-soft nontender without mass organomegaly  Extremities-2+ lower extremity edema    Assessment:  1  Flash pulmonary edema  Suspect patient has likely severe mitral stenosis contributing to this  2  Acute on chronic diastolic heart failure  Suggest a large component due to mitral valve disease which we will elucidate further  3  Hypertension  He continue amlodipine and metoprolol  4  Complete heart 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

## 2019-05-17 NOTE — PROGRESS NOTES
Progress Note - Gogo Avila 1955, 70 y o  male MRN: [id number]    Unit/Bed#: [place] [address] 2 [address] 222-02 Encounter: [id number]    Primary Care Provider: Shailesh Arora DO   Date and time admitted to hospital: 5/15/2019 12:50 PM        * Acute respiratory failure with hypoxia [facility])  Assessment & Plan  · Secondary to acute on chronic diastolic congestive heart failure, bilateral pleural effusion, atelectasis, valvular heart disease, recent pneumonia  · Patient required BiPAP in the ER on 5/15 and improved with IV Lasix  · Currently on O2 via nasal cannula   · Wean down as tolerated     Acute diastolic (congestive) heart failure (HCC)  Assessment & Plan  · With volume overload and possible flash pulmonary edema which precipitated his admission to the hospital    · Contributory factor is valvular heart disease  · Continue Lasix 40 mg IV b i d   · Continue metoprolol b i d   · Hold ARB for now due to recent dye load    · Cardiology recommendations reviewed  · Plan for left and right cardiac catheterization next week    Abnormal CT of the chest  Assessment & Plan  · With congestive heart failure and bilateral pleural effusion  · Infiltrates are likely from compression atelectasis  · Low suspicion for ongoing pneumonia  · He was treated on his last admission and procalcitonin is normal   · Continue Observation off antibiotics  · Supportive care for post pneumonia cough    Acute renal failure superimposed on stage 3 chronic kidney disease ([facility] [address] )  Assessment & Plan  · With mildly elevated creatinine of 1 56 on admission  · Acute renal failure resolved  · Creatinine now at baseline at 1 26    Type 2 diabetes mellitus with chronic kidney disease, without long-term current use of insulin [facility])  Assessment & Plan  Lab Results   Component Value Date    HGBA1C 6 9 (H) 03/07/2019       Recent Labs     05/16/19  1059 05/16/19  1613 05/16/19  2052 05/17/19  0708   POCGLU 240* 155* 150* 109       Blood Sugar Average: Last 72 hrs:    A1c at goal   Hold oral hypoglycemic agent  Substitute Januvia for Tradjenta while in the hospital  Continue on sliding scale insulin  (P) 159 4    PAF (paroxysmal atrial fibrillation) ([facility] [address] )  Assessment & Plan  · Fully anticoagulated on Eliquis  · Status post permanent pacemaker      Hyperkalemiaresolved as of 2019  Assessment & Plan  Mild, resolved          VTE Pharmacologic Prophylaxis:   Pharmacologic: Apixaban (Eliquis)  Mechanical VTE Prophylaxis in Place: No    Patient Centered Rounds: I have performed bedside rounds with nursing staff today  Time Spent for Care: 20 minutes  More than 50% of total time spent on counseling and coordination of care as described above  Current Length of Stay: 2 day(s)    Current Patient Status: Inpatient   Certification Statement: The patient will continue to require additional inpatient hospital stay due to CHF    Discharge Plan:  Patient will be here for the weekend    Code Status: Level 1 - Full Code      Subjective:   Improving shortness of breath and leg swelling  No events overnight  Patient is aware of the plan for next week    Objective:     Vitals:   Temp (24hrs), Av 8 °F (36 6 °C), Min:97 8 °F (36 6 °C), Max:97 9 °F (36 6 °C)    Temp:  [97 8 °F (36 6 °C)-97 9 °F (36 6 °C)] 97 8 °F (36 6 °C)  HR:  [61-86] 86  Resp:  [19-20] 19  BP: (137-159)/(63-72) 159/67  SpO2:  [96 %-98 %] 97 %  Body mass index is 30 84 kg/m²  Input and Output Summary (last 24 hours): Intake/Output Summary (Last 24 hours) at 2019 0947  Last data filed at 2019 0701  Gross per 24 hour   Intake    Output 2875 ml   Net -2875 ml       Physical Exam:     Physical Exam   Constitutional: He is oriented to person, place, and time  He appears well-developed  No distress  HENT:   Head: Normocephalic and atraumatic  Eyes: No scleral icterus  Neck: No JVD present  Cardiovascular: Normal rate  Exam reveals no gallop and no friction rub     Murmur heard   Pulmonary/Chest: Effort normal  No stridor  No respiratory distress  He has no wheezes  Abdominal: Soft  He exhibits no distension  There is no tenderness  Musculoskeletal:   Plus two edema   Neurological: He is alert and oriented to person, place, and time  Skin: Skin is warm and dry  He is not diaphoretic  Psychiatric: He has a normal mood and affect  His behavior is normal          Additional Data:     Labs:    Results from last 7 days   Lab Units 05/17/19  0553   WBC Thousand/uL 11 07*   HEMOGLOBIN g/dL 10 2*   HEMATOCRIT % 31 9*   PLATELETS Thousands/uL 337   NEUTROS PCT % 70   LYMPHS PCT % 9*   MONOS PCT % 6   EOS PCT % 14*     Results from last 7 days   Lab Units 05/17/19  0553  05/15/19  1314   SODIUM mmol/L 139   < > 134*   POTASSIUM mmol/L 4 8   < > 5 3   CHLORIDE mmol/L 106   < > 101   CO2 mmol/L 23   < > 17*   BUN mg/dL 38*   < > 51*   CREATININE mg/dL 1 26   < > 1 56*   ANION GAP mmol/L 10   < > 16*   CALCIUM mg/dL 9 8   < > 9 8   ALBUMIN g/dL  --   --  3 5   TOTAL BILIRUBIN mg/dL  --   --  0 65   ALK PHOS U/L  --   --  109   ALT U/L  --   --  29   AST U/L  --   --  26   GLUCOSE RANDOM mg/dL 102   < > 274*    < > = values in this interval not displayed  Results from last 7 days   Lab Units 05/15/19  1314   INR  1 12     Results from last 7 days   Lab Units 05/17/19  0708 05/16/19  2052 05/16/19  1613 05/16/19  1059 05/16/19  0817   POC GLUCOSE mg/dl 109 150* 155* 240* 143*         Results from last 7 days   Lab Units 05/15/19  1514 05/15/19  1314   LACTIC ACID mmol/L 1 4 3 0*   PROCALCITONIN ng/ml  --  <0 05           * I Have Reviewed All Lab Data Listed Above  * Additional Pertinent Lab Tests Reviewed: All Labs Within Last 24 Hours Reviewed    Imaging:    Imaging Reports Reviewed Today Include:  None  Recent Cultures (last 7 days):     Results from last 7 days   Lab Units 05/15/19  1317 05/15/19  1314   BLOOD CULTURE  No Growth at 24 hrs  No Growth at 24 hrs         Last 24 Hours Medication List:     Current Facility-Administered Medications:  acetaminophen 650 mg Oral Q6H PRN Carlos Orozco PA-C   allopurinol 300 mg Oral QAM Stephania Barros MD   amLODIPine 5 mg Oral Daily El Suarez MD   apixaban 5 mg Oral Q12H Stephania Barros MD   furosemide 80 mg Intravenous BID (diuretic) El Suarez MD   insulin lispro 1-5 Units Subcutaneous TID [facility] Stephania Barros MD   lidocaine 1 patch Topical Daily Chick Black Meza PA-C   metoprolol tartrate 50 mg Oral Q12H [address] Stephania Barros MD   pravastatin 20 mg Oral Daily With Rowan Carrillo MD   sitaGLIPtin 25 mg Oral Daily Stephania Barros MD   tamsulosin 0 4 mg Oral Daily With Rowan Carrillo MD        Today, Patient Was Seen By: Stephania Barros MD    ** Please Note: Dictation voice to text software may have been used in the creation of this document   **

## 2019-05-18 LAB
ANION GAP SERPL CALCULATED.3IONS-SCNC: 7 MMOL/L (ref 4–13)
BUN SERPL-MCNC: 36 MG/DL (ref 5–25)
CALCIUM SERPL-MCNC: 9.8 MG/DL (ref 8.3–10.1)
CHLORIDE SERPL-SCNC: 107 MMOL/L (ref 100–108)
CO2 SERPL-SCNC: 26 MMOL/L (ref 21–32)
CREAT SERPL-MCNC: 1.31 MG/DL (ref 0.6–1.3)
GFR SERPL CREATININE-BSD FRML MDRD: 57 ML/MIN/1.73SQ M
GLUCOSE SERPL-MCNC: 116 MG/DL (ref 65–140)
GLUCOSE SERPL-MCNC: 175 MG/DL (ref 65–140)
GLUCOSE SERPL-MCNC: 265 MG/DL (ref 65–140)
GLUCOSE SERPL-MCNC: 87 MG/DL (ref 65–140)
POTASSIUM SERPL-SCNC: 4.7 MMOL/L (ref 3.5–5.3)
SODIUM SERPL-SCNC: 140 MMOL/L (ref 136–145)

## 2019-05-18 PROCEDURE — 80048 BASIC METABOLIC PNL TOTAL CA: CPT | Performed by: INTERNAL MEDICINE

## 2019-05-18 PROCEDURE — 82948 REAGENT STRIP/BLOOD GLUCOSE: CPT

## 2019-05-18 PROCEDURE — 94660 CPAP INITIATION&MGMT: CPT

## 2019-05-18 PROCEDURE — 99232 SBSQ HOSP IP/OBS MODERATE 35: CPT | Performed by: INTERNAL MEDICINE

## 2019-05-18 RX ADMIN — INSULIN LISPRO 2 UNITS: 100 INJECTION, SOLUTION INTRAVENOUS; SUBCUTANEOUS at 11:43

## 2019-05-18 RX ADMIN — AMLODIPINE BESYLATE 5 MG: 5 TABLET ORAL at 08:23

## 2019-05-18 RX ADMIN — APIXABAN 5 MG: 5 TABLET, FILM COATED ORAL at 21:17

## 2019-05-18 RX ADMIN — INSULIN LISPRO 1 UNITS: 100 INJECTION, SOLUTION INTRAVENOUS; SUBCUTANEOUS at 17:22

## 2019-05-18 RX ADMIN — BETAMETHASONE VALERATE 1 APPLICATION: 1 CREAM TOPICAL at 17:22

## 2019-05-18 RX ADMIN — PRAVASTATIN SODIUM 20 MG: 20 TABLET ORAL at 17:22

## 2019-05-18 RX ADMIN — FUROSEMIDE 80 MG: 10 INJECTION, SOLUTION INTRAMUSCULAR; INTRAVENOUS at 17:22

## 2019-05-18 RX ADMIN — ALLOPURINOL 300 MG: 100 TABLET ORAL at 08:23

## 2019-05-18 RX ADMIN — METOPROLOL TARTRATE 50 MG: 50 TABLET, FILM COATED ORAL at 08:23

## 2019-05-18 RX ADMIN — APIXABAN 5 MG: 5 TABLET, FILM COATED ORAL at 08:23

## 2019-05-18 RX ADMIN — METOPROLOL TARTRATE 50 MG: 50 TABLET, FILM COATED ORAL at 21:16

## 2019-05-18 RX ADMIN — FUROSEMIDE 80 MG: 10 INJECTION, SOLUTION INTRAMUSCULAR; INTRAVENOUS at 08:23

## 2019-05-18 RX ADMIN — TAMSULOSIN HYDROCHLORIDE 0.4 MG: 0.4 CAPSULE ORAL at 17:22

## 2019-05-18 RX ADMIN — SITAGLIPTIN 25 MG: 50 TABLET, FILM COATED ORAL at 08:23

## 2019-05-18 NOTE — ASSESSMENT & PLAN NOTE
· With mildly elevated creatinine of 1 56 on admission  · Acute renal failure resolved  · Creatinine now at baseline at 1 3

## 2019-05-18 NOTE — ASSESSMENT & PLAN NOTE
· With volume overload and possible flash pulmonary edema related to his valvular heart disease which precipitated his admission to the hospital    · Continue Lasix 80 mg IV b i d   · Continue metoprolol 50 mg b i d   · Hold ARB for now due to recent dye load    · Cardiology recommendations reviewed  · Plan for left and right cardiac catheterization next week

## 2019-05-18 NOTE — ASSESSMENT & PLAN NOTE
Lab Results   Component Value Date    HGBA1C 6 9 (H) 03/07/2019       Recent Labs     05/17/19  1118 05/17/19  1615 05/18/19  0736 05/18/19  1116   POCGLU 242* 168* 116 265*       Blood Sugar Average: Last 72 hrs:    A1c at goal   Hold oral hypoglycemic agent  Substitute Januvia for Tradjenta while in the hospital  Continue on sliding scale insulin  (P) 962 [id number]

## 2019-05-18 NOTE — PLAN OF CARE
Problem: Potential for Falls  Goal: Patient will remain free of falls  Description  INTERVENTIONS:  - Assess patient frequently for physical needs  -  Identify cognitive and physical deficits and behaviors that affect risk of falls    -  [place] fall precautions as indicated by assessment   - Educate patient/family on patient safety including physical limitations  - Instruct patient to call for assistance with activity based on assessment  - Modify environment to reduce risk of injury  - Consider OT/PT consult to assist with strengthening/mobility  Outcome: Progressing     Problem: PAIN - ADULT  Goal: Verbalizes/displays adequate comfort level or baseline comfort level  Description  Interventions:  - Encourage patient to monitor pain and request assistance  - Assess pain using appropriate pain scale  - Administer analgesics based on type and severity of pain and evaluate response  - Implement non-pharmacological measures as appropriate and evaluate response  - Consider cultural and social influences on pain and pain management  - Notify physician/advanced practitioner if interventions unsuccessful or patient reports new pain  Outcome: Progressing     Problem: DISCHARGE PLANNING  Goal: Discharge to home or other facility with appropriate resources  Description  INTERVENTIONS:  - Identify barriers to discharge w/patient and caregiver  - Arrange for needed discharge resources and transportation as appropriate  - Identify discharge learning needs (meds, wound care, etc )  - Arrange for interpretive services to assist at discharge as needed  - Refer to Case Management Department for coordinating discharge planning if the patient needs post-hospital services based on physician/advanced practitioner order or complex needs related to functional status, cognitive ability, or social support system  Outcome: Progressing     Problem: CARDIOVASCULAR - ADULT  Goal: Maintains optimal cardiac output and hemodynamic stability  Description  INTERVENTIONS:  - Monitor I/O, vital signs and rhythm  - Monitor for S/S and trends of decreased cardiac output i e  bleeding, hypotension  - Administer and titrate ordered vasoactive medications to optimize hemodynamic stability  - Assess quality of pulses, skin color and temperature  - Assess for signs of decreased coronary artery perfusion - ex   Angina  - Instruct patient to report change in severity of symptoms  Outcome: Progressing  Goal: Absence of cardiac dysrhythmias or at baseline rhythm  Description  INTERVENTIONS:  - Continuous cardiac monitoring, monitor vital signs, obtain 12 lead EKG if indicated  - Administer antiarrhythmic and heart rate control medications as ordered  - Monitor electrolytes and administer replacement therapy as ordered  Outcome: Progressing     Problem: RESPIRATORY - ADULT  Goal: Achieves optimal ventilation and oxygenation  Description  INTERVENTIONS:  - Assess for changes in respiratory status  - Assess for changes in mentation and behavior  - Position to facilitate oxygenation and minimize respiratory effort  - Oxygen administration by appropriate delivery method based on oxygen saturation (per order) or ABGs  - Initiate smoking cessation education as indicated  - Encourage broncho-pulmonary hygiene including cough, deep breathe, Incentive Spirometry  - Assess the need for suctioning and aspirate as needed  - Assess and instruct to report SOB or any respiratory difficulty  - Respiratory Therapy support as indicated  Outcome: Progressing     Problem: INFECTION - ADULT  Goal: Absence or prevention of progression during hospitalization  Description  INTERVENTIONS:  - Assess and monitor for signs and symptoms of infection  - Monitor lab/diagnostic results  - Monitor all insertion sites, i e  indwelling lines, tubes, and drains  - Monitor endotracheal (as able) and nasal secretions for changes in amount and color  - [place] appropriate cooling/warming therapies per order  - Administer medications as ordered  - Instruct and encourage patient and family to use good hand hygiene technique  - Identify and instruct in appropriate isolation precautions for identified infection/condition  Outcome: Progressing  Goal: Absence of fever/infection during neutropenic period  Description  INTERVENTIONS:  - Monitor WBC  - Implement neutropenic guidelines  Outcome: Progressing     Problem: SAFETY ADULT  Goal: Patient will remain free of falls  Description  INTERVENTIONS:  - Assess patient frequently for physical needs  -  Identify cognitive and physical deficits and behaviors that affect risk of falls    -  [place] fall precautions as indicated by assessment   - Educate patient/family on patient safety including physical limitations  - Instruct patient to call for assistance with activity based on assessment  - Modify environment to reduce risk of injury  - Consider OT/PT consult to assist with strengthening/mobility  Outcome: Progressing  Goal: Maintain or return to baseline ADL function  Description  INTERVENTIONS:  -  Assess patient's ability to carry out ADLs; assess patient's baseline for ADL function and identify physical deficits which impact ability to perform ADLs (bathing, care of mouth/teeth, toileting, grooming, dressing, etc )  - Assess/evaluate cause of self-care deficits   - Assess range of motion  - Assess patient's mobility; develop plan if impaired  - Assess patient's need for assistive devices and provide as appropriate  - Encourage maximum independence but intervene and supervise when necessary  ¯ Involve family in performance of ADLs  ¯ Assess for home care needs following discharge   ¯ Request OT consult to assist with ADL evaluation and planning for discharge  ¯ Provide patient education as appropriate  Outcome: Progressing  Goal: Maintain or return mobility status to optimal level  Description  INTERVENTIONS:  - Assess patient's baseline mobility status (ambulation, transfers, stairs, etc )    - Identify cognitive and physical deficits and behaviors that affect mobility  - Identify mobility aids required to assist with transfers and/or ambulation (gait belt, sit-to-stand, lift, walker, cane, etc )  - [place] fall precautions as indicated by assessment  - Record patient progress and toleration of activity level on Mobility SBAR; progress patient to next Phase/Stage  - Instruct patient to call for assistance with activity based on assessment  - Request Rehabilitation consult to assist with strengthening/weightbearing, etc   Outcome: Progressing     Problem: Knowledge Deficit  Goal: Patient/family/caregiver demonstrates understanding of disease process, treatment plan, medications, and discharge instructions  Description  Complete learning assessment and assess knowledge base    Interventions:  - Provide teaching at level of understanding  - Provide teaching via preferred learning methods  Outcome: Progressing     Problem: METABOLIC, FLUID AND ELECTROLYTES - ADULT  Goal: Electrolytes maintained within normal limits  Description  INTERVENTIONS:  - Monitor labs and assess patient for signs and symptoms of electrolyte imbalances  - Administer electrolyte replacement as ordered  - Monitor response to electrolyte replacements, including repeat lab results as appropriate  - Instruct patient on fluid and nutrition as appropriate  Outcome: Progressing  Goal: Fluid balance maintained  Description  INTERVENTIONS:  - Monitor labs and assess for signs and symptoms of volume excess or deficit  - Monitor I/O and WT  - Instruct patient on fluid and nutrition as appropriate  Outcome: Progressing  Goal: Glucose maintained within target range  Description  INTERVENTIONS:  - Monitor Blood Glucose as ordered  - Assess for signs and symptoms of hyperglycemia and hypoglycemia  - Administer ordered medications to maintain glucose within target range  - Assess nutritional intake and initiate nutrition service referral as needed  Outcome: Progressing

## 2019-05-18 NOTE — PROGRESS NOTES
Progress Note - Isabelle Dopp 1955, 70 y o  male MRN: [id number]    Unit/Bed#: E4 -01 Encounter: [id number]    Primary Care Provider: Kobi Oscar DO   Date and time admitted to hospital: 5/15/2019 12:50 PM        * Acute respiratory failure with hypoxia [facility])  Assessment & Plan  · Secondary to acute on chronic diastolic congestive heart failure, bilateral pleural effusion, atelectasis, valvular heart disease, recent pneumonia  · Patient required BiPAP in the ER on 5/15 and improved with IV Lasix  · Currently on O2 via nasal cannula   · Wean down as tolerated     Acute diastolic (congestive) heart failure (HCC)  Assessment & Plan  · With volume overload and possible flash pulmonary edema related to his valvular heart disease which precipitated his admission to the hospital    · Continue Lasix 80 mg IV b i d   · Continue metoprolol 50 mg b i d   · Hold ARB for now due to recent dye load    · Cardiology recommendations reviewed  · Plan for left and right cardiac catheterization next week    Abnormal CT of the chest  Assessment & Plan  · With congestive heart failure and bilateral pleural effusion  · Infiltrates are likely from compression atelectasis  · Low suspicion for ongoing pneumonia  · He was treated on his last admission and procalcitonin is normal   · Continue Observation off antibiotics  · Supportive care for post pneumonia cough    Acute renal failure superimposed on stage 3 chronic kidney disease ([address] )  Assessment & Plan  · With mildly elevated creatinine of 1 56 on admission  · Acute renal failure resolved  · Creatinine now at baseline at 1 3    Type 2 diabetes mellitus with chronic kidney disease, without long-term current use of insulin [facility])  Assessment & Plan  Lab Results   Component Value Date    HGBA1C 6 9 (H) 03/07/2019       Recent Labs     05/17/19  1118 05/17/19  1615 05/18/19  0736 05/18/19  1116   POCGLU 242* 168* 116 265*       Blood Sugar Average: Last 72 hrs:    A1c at goal   Hold oral hypoglycemic agent  Substitute Januvia for Tradjenta while in the hospital  Continue on sliding scale insulin  (P) 895 [phone]    PAF (paroxysmal atrial fibrillation) ([facility] [address] )  Assessment & Plan  · Fully anticoagulated on Eliquis  · Status post permanent pacemaker        VTE Pharmacologic Prophylaxis:   Pharmacologic: Apixaban (Eliquis)  Mechanical VTE Prophylaxis in Place: No    Patient Centered Rounds: I have performed bedside rounds with nursing staff today  Discussions with Specialists or Other Care Team Provider:  Cardiology      Time Spent for Care: 20 minutes  More than 50% of total time spent on counseling and coordination of care as described above  Current Length of Stay: 3 day(s)    Current Patient Status: Inpatient   Certification Statement: The patient will continue to require additional inpatient hospital stay due to Cardiac catheterization next week    Discharge Plan:  Not ready for discharge this weekend    Code Status: Level 1 - Full Code      Subjective:   Improved shortness of breath  Felt better after taking a shower  Still on low-dose oxygen  Improving leg swelling    Objective:     Vitals:   Temp (24hrs), Av 6 °F (37 °C), Min:97 9 °F (36 6 °C), Max:99 3 °F (37 4 °C)    Temp:  [97 9 °F (36 6 °C)-99 3 °F (37 4 °C)] 98 °F (36 7 °C)  HR:  [65-72] 65  Resp:  [18-19] 18  BP: (142-155)/(64-72) 143/65  SpO2:  [94 %-97 %] 95 %  Body mass index is 29 8 kg/m²  Input and Output Summary (last 24 hours): Intake/Output Summary (Last 24 hours) at 2019 1452  Last data filed at 2019 1306  Gross per 24 hour   Intake    Output 2700 ml   Net -2700 ml       Physical Exam:     Physical Exam   Constitutional: He is oriented to person, place, and time  He appears well-developed  No distress  HENT:   Head: Normocephalic and atraumatic  Eyes: No scleral icterus  Neck: No JVD present  Cardiovascular: Normal rate  Murmur heard    Pulmonary/Chest: Effort normal  No stridor  No respiratory distress  He has no wheezes  Abdominal: Soft  He exhibits no distension  There is no tenderness  There is no guarding  Musculoskeletal: He exhibits edema  Neurological: He is alert and oriented to person, place, and time  Skin: Skin is warm and dry  He is not diaphoretic  Psychiatric: He has a normal mood and affect  His behavior is normal        Additional Data:     Labs:    Results from last 7 days   Lab Units 05/17/19  0553   WBC Thousand/uL 11 07*   HEMOGLOBIN g/dL 10 2*   HEMATOCRIT % 31 9*   PLATELETS Thousands/uL 337   NEUTROS PCT % 70   LYMPHS PCT % 9*   MONOS PCT % 6   EOS PCT % 14*     Results from last 7 days   Lab Units 05/18/19  0507  05/15/19  1314   SODIUM mmol/L 140   < > 134*   POTASSIUM mmol/L 4 7   < > 5 3   CHLORIDE mmol/L 107   < > 101   CO2 mmol/L 26   < > 17*   BUN mg/dL 36*   < > 51*   CREATININE mg/dL 1 31*   < > 1 56*   ANION GAP mmol/L 7   < > 16*   CALCIUM mg/dL 9 8   < > 9 8   ALBUMIN g/dL  --   --  3 5   TOTAL BILIRUBIN mg/dL  --   --  0 65   ALK PHOS U/L  --   --  109   ALT U/L  --   --  29   AST U/L  --   --  26   GLUCOSE RANDOM mg/dL 87   < > 274*    < > = values in this interval not displayed  Results from last 7 days   Lab Units 05/15/19  1314   INR  1 12     Results from last 7 days   Lab Units 05/18/19  1116 05/18/19  0736 05/17/19  1615 05/17/19  1118 05/17/19  0708 05/16/19  2052 05/16/19  1613 05/16/19  1059 05/16/19  0817   POC GLUCOSE mg/dl 265* 116 168* 242* 109 150* 155* 240* 143*         Results from last 7 days   Lab Units 05/15/19  1514 05/15/19  1314   LACTIC ACID mmol/L 1 4 3 0*   PROCALCITONIN ng/ml  --  <0 05           * I Have Reviewed All Lab Data Listed Above  * Additional Pertinent Lab Tests Reviewed:  All Labs Within Last 24 Hours Reviewed    Imaging:    Imaging Reports Reviewed Today Include:  None  Recent Cultures (last 7 days):     Results from last 7 days   Lab Units 05/15/19  1317 05/15/19  1314   BLOOD CULTURE  No Growth at 48 hrs  No Growth at 48 hrs  Last 24 Hours Medication List:     Current Facility-Administered Medications:  acetaminophen 650 mg Oral Q6H PRN Omayra Roberts PA-C   allopurinol 300 mg Oral QAM Jennifer Chapa MD   amLODIPine 5 mg Oral Daily Lisa Laboy MD   apixaban 5 mg Oral Q12H Lisa Laboy MD   furosemide 80 mg Intravenous BID (diuretic) Lisa Laboy MD   insulin lispro 1-5 Units Subcutaneous TID [facility] [place] Jennifer Chapa MD   lidocaine 1 patch Topical Daily Delores Meza PA-C   metoprolol tartrate 50 mg Oral Q12H [address] Jennifer Chapa MD   pravastatin 20 mg Oral Daily With Jordan Mojica MD   sitaGLIPtin 25 mg Oral Daily Jennifer Chapa MD   tamsulosin 0 4 mg Oral Daily With Jordan Mojica MD        Today, Patient Was Seen By: Jennifer Chapa MD    ** Please Note: Dictation voice to text software may have been used in the creation of this document   **

## 2019-05-18 NOTE — PLAN OF CARE
Problem: Potential for Falls  Goal: Patient will remain free of falls  Description  INTERVENTIONS:  - Assess patient frequently for physical needs  -  Identify cognitive and physical deficits and behaviors that affect risk of falls    -  [place] fall precautions as indicated by assessment   - Educate patient/family on patient safety including physical limitations  - Instruct patient to call for assistance with activity based on assessment  - Modify environment to reduce risk of injury  - Consider OT/PT consult to assist with strengthening/mobility  Outcome: Progressing     Problem: PAIN - ADULT  Goal: Verbalizes/displays adequate comfort level or baseline comfort level  Description  Interventions:  - Encourage patient to monitor pain and request assistance  - Assess pain using appropriate pain scale  - Administer analgesics based on type and severity of pain and evaluate response  - Implement non-pharmacological measures as appropriate and evaluate response  - Consider cultural and social influences on pain and pain management  - Notify physician/advanced practitioner if interventions unsuccessful or patient reports new pain  Outcome: Progressing     Problem: DISCHARGE PLANNING  Goal: Discharge to home or other facility with appropriate resources  Description  INTERVENTIONS:  - Identify barriers to discharge w/patient and caregiver  - Arrange for needed discharge resources and transportation as appropriate  - Identify discharge learning needs (meds, wound care, etc )  - Arrange for interpretive services to assist at discharge as needed  - Refer to Case Management Department for coordinating discharge planning if the patient needs post-hospital services based on physician/advanced practitioner order or complex needs related to functional status, cognitive ability, or social support system  Outcome: Progressing     Problem: CARDIOVASCULAR - ADULT  Goal: Maintains optimal cardiac output and hemodynamic stability  Description  INTERVENTIONS:  - Monitor I/O, vital signs and rhythm  - Monitor for S/S and trends of decreased cardiac output i e  bleeding, hypotension  - Administer and titrate ordered vasoactive medications to optimize hemodynamic stability  - Assess quality of pulses, skin color and temperature  - Assess for signs of decreased coronary artery perfusion - ex   Angina  - Instruct patient to report change in severity of symptoms  Outcome: Progressing  Goal: Absence of cardiac dysrhythmias or at baseline rhythm  Description  INTERVENTIONS:  - Continuous cardiac monitoring, monitor vital signs, obtain 12 lead EKG if indicated  - Administer antiarrhythmic and heart rate control medications as ordered  - Monitor electrolytes and administer replacement therapy as ordered  Outcome: Progressing     Problem: RESPIRATORY - ADULT  Goal: Achieves optimal ventilation and oxygenation  Description  INTERVENTIONS:  - Assess for changes in respiratory status  - Assess for changes in mentation and behavior  - Position to facilitate oxygenation and minimize respiratory effort  - Oxygen administration by appropriate delivery method based on oxygen saturation (per order) or ABGs  - Initiate smoking cessation education as indicated  - Encourage broncho-pulmonary hygiene including cough, deep breathe, Incentive Spirometry  - Assess the need for suctioning and aspirate as needed  - Assess and instruct to report SOB or any respiratory difficulty  - Respiratory Therapy support as indicated  Outcome: Progressing     Problem: INFECTION - ADULT  Goal: Absence or prevention of progression during hospitalization  Description  INTERVENTIONS:  - Assess and monitor for signs and symptoms of infection  - Monitor lab/diagnostic results  - Monitor all insertion sites, i e  indwelling lines, tubes, and drains  - Monitor endotracheal (as able) and nasal secretions for changes in amount and color  - [place] appropriate cooling/warming therapies per order  - Administer medications as ordered  - Instruct and encourage patient and family to use good hand hygiene technique  - Identify and instruct in appropriate isolation precautions for identified infection/condition  Outcome: Progressing  Goal: Absence of fever/infection during neutropenic period  Description  INTERVENTIONS:  - Monitor WBC  - Implement neutropenic guidelines  Outcome: Progressing     Problem: SAFETY ADULT  Goal: Patient will remain free of falls  Description  INTERVENTIONS:  - Assess patient frequently for physical needs  -  Identify cognitive and physical deficits and behaviors that affect risk of falls    -  [place] fall precautions as indicated by assessment   - Educate patient/family on patient safety including physical limitations  - Instruct patient to call for assistance with activity based on assessment  - Modify environment to reduce risk of injury  - Consider OT/PT consult to assist with strengthening/mobility  Outcome: Progressing  Goal: Maintain or return to baseline ADL function  Description  INTERVENTIONS:  -  Assess patient's ability to carry out ADLs; assess patient's baseline for ADL function and identify physical deficits which impact ability to perform ADLs (bathing, care of mouth/teeth, toileting, grooming, dressing, etc )  - Assess/evaluate cause of self-care deficits   - Assess range of motion  - Assess patient's mobility; develop plan if impaired  - Assess patient's need for assistive devices and provide as appropriate  - Encourage maximum independence but intervene and supervise when necessary  ¯ Involve family in performance of ADLs  ¯ Assess for home care needs following discharge   ¯ Request OT consult to assist with ADL evaluation and planning for discharge  ¯ Provide patient education as appropriate  Outcome: Progressing  Goal: Maintain or return mobility status to optimal level  Description  INTERVENTIONS:  - Assess patient's baseline mobility status (ambulation, transfers, stairs, etc )    - Identify cognitive and physical deficits and behaviors that affect mobility  - Identify mobility aids required to assist with transfers and/or ambulation (gait belt, sit-to-stand, lift, walker, cane, etc )  - [place] fall precautions as indicated by assessment  - Record patient progress and toleration of activity level on Mobility SBAR; progress patient to next Phase/Stage  - Instruct patient to call for assistance with activity based on assessment  - Request Rehabilitation consult to assist with strengthening/weightbearing, etc   Outcome: Progressing     Problem: Knowledge Deficit  Goal: Patient/family/caregiver demonstrates understanding of disease process, treatment plan, medications, and discharge instructions  Description  Complete learning assessment and assess knowledge base    Interventions:  - Provide teaching at level of understanding  - Provide teaching via preferred learning methods  Outcome: Progressing     Problem: METABOLIC, FLUID AND ELECTROLYTES - ADULT  Goal: Electrolytes maintained within normal limits  Description  INTERVENTIONS:  - Monitor labs and assess patient for signs and symptoms of electrolyte imbalances  - Administer electrolyte replacement as ordered  - Monitor response to electrolyte replacements, including repeat lab results as appropriate  - Instruct patient on fluid and nutrition as appropriate  Outcome: Progressing  Goal: Fluid balance maintained  Description  INTERVENTIONS:  - Monitor labs and assess for signs and symptoms of volume excess or deficit  - Monitor I/O and WT  - Instruct patient on fluid and nutrition as appropriate  Outcome: Progressing  Goal: Glucose maintained within target range  Description  INTERVENTIONS:  - Monitor Blood Glucose as ordered  - Assess for signs and symptoms of hyperglycemia and hypoglycemia  - Administer ordered medications to maintain glucose within target range  - Assess nutritional intake and initiate nutrition service referral as needed  Outcome: Progressing

## 2019-05-19 LAB
ANION GAP SERPL CALCULATED.3IONS-SCNC: 11 MMOL/L (ref 4–13)
BUN SERPL-MCNC: 33 MG/DL (ref 5–25)
CALCIUM SERPL-MCNC: 9.9 MG/DL (ref 8.3–10.1)
CHLORIDE SERPL-SCNC: 104 MMOL/L (ref 100–108)
CO2 SERPL-SCNC: 23 MMOL/L (ref 21–32)
CREAT SERPL-MCNC: 1.16 MG/DL (ref 0.6–1.3)
GFR SERPL CREATININE-BSD FRML MDRD: 66 ML/MIN/1.73SQ M
GLUCOSE SERPL-MCNC: 125 MG/DL (ref 65–140)
GLUCOSE SERPL-MCNC: 126 MG/DL (ref 65–140)
GLUCOSE SERPL-MCNC: 130 MG/DL (ref 65–140)
GLUCOSE SERPL-MCNC: 149 MG/DL (ref 65–140)
GLUCOSE SERPL-MCNC: 206 MG/DL (ref 65–140)
POTASSIUM SERPL-SCNC: 4.4 MMOL/L (ref 3.5–5.3)
SODIUM SERPL-SCNC: 138 MMOL/L (ref 136–145)

## 2019-05-19 PROCEDURE — 94660 CPAP INITIATION&MGMT: CPT

## 2019-05-19 PROCEDURE — 80048 BASIC METABOLIC PNL TOTAL CA: CPT | Performed by: INTERNAL MEDICINE

## 2019-05-19 PROCEDURE — 82948 REAGENT STRIP/BLOOD GLUCOSE: CPT

## 2019-05-19 PROCEDURE — 99232 SBSQ HOSP IP/OBS MODERATE 35: CPT | Performed by: INTERNAL MEDICINE

## 2019-05-19 RX ADMIN — TAMSULOSIN HYDROCHLORIDE 0.4 MG: 0.4 CAPSULE ORAL at 17:21

## 2019-05-19 RX ADMIN — ALLOPURINOL 300 MG: 100 TABLET ORAL at 08:57

## 2019-05-19 RX ADMIN — FUROSEMIDE 80 MG: 10 INJECTION, SOLUTION INTRAMUSCULAR; INTRAVENOUS at 08:56

## 2019-05-19 RX ADMIN — SITAGLIPTIN 25 MG: 50 TABLET, FILM COATED ORAL at 08:57

## 2019-05-19 RX ADMIN — METOPROLOL TARTRATE 50 MG: 50 TABLET, FILM COATED ORAL at 08:57

## 2019-05-19 RX ADMIN — INSULIN LISPRO 1 UNITS: 100 INJECTION, SOLUTION INTRAVENOUS; SUBCUTANEOUS at 12:36

## 2019-05-19 RX ADMIN — AMLODIPINE BESYLATE 5 MG: 5 TABLET ORAL at 08:57

## 2019-05-19 RX ADMIN — FUROSEMIDE 80 MG: 10 INJECTION, SOLUTION INTRAMUSCULAR; INTRAVENOUS at 17:22

## 2019-05-19 RX ADMIN — BETAMETHASONE VALERATE 1 APPLICATION: 1 CREAM TOPICAL at 08:58

## 2019-05-19 RX ADMIN — APIXABAN 5 MG: 5 TABLET, FILM COATED ORAL at 08:57

## 2019-05-19 RX ADMIN — METOPROLOL TARTRATE 50 MG: 50 TABLET, FILM COATED ORAL at 21:46

## 2019-05-19 RX ADMIN — PRAVASTATIN SODIUM 20 MG: 20 TABLET ORAL at 17:22

## 2019-05-19 NOTE — ASSESSMENT & PLAN NOTE
· Secondary to acute on chronic diastolic congestive heart failure, bilateral pleural effusion, atelectasis, valvular heart disease, recent pneumonia  · Acute respiratory failure/hypoxemia resolved  · Off oxygen today

## 2019-05-19 NOTE — ASSESSMENT & PLAN NOTE
· Fully anticoagulated on Eliquis    · Status post permanent pacemaker  · Hold Eliquis dose after today in anticipation for cardiac catheterization tomorrow

## 2019-05-19 NOTE — ASSESSMENT & PLAN NOTE
· For right and left heart catheterization tomorrow  · If mitral valve stenosis is severe/critical, he will need CT surgery evaluation

## 2019-05-19 NOTE — ASSESSMENT & PLAN NOTE
Lab Results   Component Value Date    HGBA1C 6 9 (H) 03/07/2019       Recent Labs     05/18/19  1116 05/18/19  1547 05/19/19  0710 05/19/19  1138   POCGLU 265* 175* 130 206*       Blood Sugar Average: Last 72 hrs:    A1c at goal   Hold oral hypoglycemic agent  Substitute Januvia for Tradjenta while in the hospital  Continue on sliding scale insulin  (P) [id number]

## 2019-05-19 NOTE — PROGRESS NOTES
Progress Note - Sarah Gurrola 1955, 70 y o  male MRN: [id number]    Unit/Bed#: E4 -01 Encounter: [id number]    Primary Care Provider: Dany Willett DO   Date and time admitted to hospital: 5/15/2019 12:50 PM        * Acute respiratory failure with hypoxia ([facility] [address] )  Assessment & Plan  · Secondary to acute on chronic diastolic congestive heart failure, bilateral pleural effusion, atelectasis, valvular heart disease, recent pneumonia  · Acute respiratory failure/hypoxemia resolved  · Off oxygen today    Acute diastolic (congestive) heart failure ([address] )  Assessment & Plan  · With volume overload and possible flash pulmonary edema related to his valvular heart disease which precipitated his admission to the hospital    · Received several doses of intravenous Lasix and volume status is now stable  · Lung auscultation is clear  · Hold further diuretics in anticipation for cardiac catheterization tomorrow  · Continue metoprolol 50 b i d      Abnormal CT of the chest  Assessment & Plan  · With congestive heart failure and bilateral pleural effusion  · Infiltrates are likely from compression atelectasis  · Low suspicion for ongoing pneumonia  · He was treated on his last admission and procalcitonin is normal   · Continue Observation off antibiotics  · Supportive care for post pneumonia cough    Mitral valve stenosis  Assessment & Plan  · For right and left heart catheterization tomorrow  · If mitral valve stenosis is severe/critical, he will need CT surgery evaluation    Acute renal failure superimposed on stage 3 chronic kidney disease (HCC)  Assessment & Plan  · With mildly elevated creatinine of 1 56 on admission  · Acute renal failure resolved  · Creatinine now at baseline at 1 1 and below baseline    Type 2 diabetes mellitus with chronic kidney disease, without long-term current use of insulin [facility])  Assessment & Plan  Lab Results   Component Value Date    HGBA1C 6 9 (H) 03/07/2019       Recent Labs 19  1116 19  1547 19  0710 19  1138   POCGLU 265* 175* 130 206*       Blood Sugar Average: Last 72 hrs:    A1c at goal   Hold oral hypoglycemic agent  Substitute Januvia for Tradjenta while in the hospital  Continue on sliding scale insulin  (P) [id number]    PAF (paroxysmal atrial fibrillation) ([facility])  Assessment & Plan  · Fully anticoagulated on Eliquis  · Status post permanent pacemaker  · Hold Eliquis dose after today in anticipation for cardiac catheterization tomorrow          VTE Pharmacologic Prophylaxis:   Pharmacologic: Apixaban (Eliquis)  Mechanical VTE Prophylaxis in Place: No    Patient Centered Rounds: Discussed with his nurse    Education and Discussions with Family / Patient:  Spoke with Mary Patel and gave update    Time Spent for Care: 20 minutes  More than 50% of total time spent on counseling and coordination of care as described above  Current Length of Stay: 4 day(s)    Current Patient Status: Inpatient   Certification Statement: The patient will continue to require additional inpatient hospital stay due to chf    Discharge Plan: possible dc tomorrow or Tuesday    Code Status: Level 1 - Full Code    Subjective:   Patient is off oxygen today  Reports breathing is close to normal if not normal  Leg swelling is much improved  Objective:     Vitals:   Temp (24hrs), Av 5 °F (36 9 °C), Min:98 2 °F (36 8 °C), Max:98 9 °F (37 2 °C)    Temp:  [98 2 °F (36 8 °C)-98 9 °F (37 2 °C)] 98 2 °F (36 8 °C)  HR:  [62-67] 62  Resp:  [18] 18  BP: (155-160)/(70-79) 155/70  SpO2:  [97 %-99 %] 97 %  Body mass index is 29 86 kg/m²  Input and Output Summary (last 24 hours): Intake/Output Summary (Last 24 hours) at 2019 1242  Last data filed at 2019 1201  Gross per 24 hour   Intake 360 ml   Output 2025 ml   Net -1665 ml       Physical Exam:     Physical Exam   Constitutional: He appears well-developed  No distress     HENT:   Head: Normocephalic and atraumatic  Eyes: No scleral icterus  Neck: No JVD present  Cardiovascular: Normal rate  Murmur heard  Low pitched systolic murmur 3/6   Pulmonary/Chest: Effort normal  No stridor  No respiratory distress  He has no wheezes  Abdominal: Soft  He exhibits no distension  There is no tenderness  Musculoskeletal: He exhibits edema  Still with pitting edema on both legs but much improved   Skin: Skin is warm and dry  He is not diaphoretic  Psychiatric: He has a normal mood and affect  Additional Data:     Labs:    Results from last 7 days   Lab Units 05/17/19  0553   WBC Thousand/uL 11 07*   HEMOGLOBIN g/dL 10 2*   HEMATOCRIT % 31 9*   PLATELETS Thousands/uL 337   NEUTROS PCT % 70   LYMPHS PCT % 9*   MONOS PCT % 6   EOS PCT % 14*     Results from last 7 days   Lab Units 05/19/19  0644  05/15/19  1314   SODIUM mmol/L 138   < > 134*   POTASSIUM mmol/L 4 4   < > 5 3   CHLORIDE mmol/L 104   < > 101   CO2 mmol/L 23   < > 17*   BUN mg/dL 33*   < > 51*   CREATININE mg/dL 1 16   < > 1 56*   ANION GAP mmol/L 11   < > 16*   CALCIUM mg/dL 9 9   < > 9 8   ALBUMIN g/dL  --   --  3 5   TOTAL BILIRUBIN mg/dL  --   --  0 65   ALK PHOS U/L  --   --  109   ALT U/L  --   --  29   AST U/L  --   --  26   GLUCOSE RANDOM mg/dL 126   < > 274*    < > = values in this interval not displayed  Results from last 7 days   Lab Units 05/15/19  1314   INR  1 12     Results from last 7 days   Lab Units 05/19/19  1138 05/19/19  0710 05/18/19  1547 05/18/19  1116 05/18/19  0736 05/17/19  1615 05/17/19  1118 05/17/19  0708 05/16/19  2052 05/16/19  1613 05/16/19  1059 05/16/19  0817   POC GLUCOSE mg/dl 206* 130 175* 265* 116 168* 242* 109 150* 155* 240* 143*         Results from last 7 days   Lab Units 05/15/19  1514 05/15/19  1314   LACTIC ACID mmol/L 1 4 3 0*   PROCALCITONIN ng/ml  --  <0 05           * I Have Reviewed All Lab Data Listed Above  * Additional Pertinent Lab Tests Reviewed:  All Labs Within Last 24 Hours Reviewed    Imaging:    Imaging Reports Reviewed Today Include:  None  Recent Cultures (last 7 days):     Results from last 7 days   Lab Units 05/15/19  1317 05/15/19  1314   BLOOD CULTURE  No Growth at 72 hrs  No Growth at 72 hrs  Last 24 Hours Medication List:     Current Facility-Administered Medications:  acetaminophen 650 mg Oral Q6H PRN Delia Ziegler PA-C   allopurinol 300 mg Oral QAM Lisandra Wilson MD   amLODIPine 5 mg Oral Daily Alix Hong MD   betamethasone valerate 1 application Topical Daily Lisandra Wilson MD   furosemide 80 mg Intravenous BID (diuretic) Alix Hong MD   insulin lispro 1-5 Units Subcutaneous TID [facility] Lisandra Wilson MD   lidocaine 1 patch Topical Daily Armando Meza PA-C   metoprolol tartrate 50 mg Oral Q12H [address] Lisandra Wilson MD   pravastatin 20 mg Oral Daily With Edmar Marti MD   sitaGLIPtin 25 mg Oral Daily Lisandra Wilson MD   tamsulosin 0 4 mg Oral Daily With Edmar Marti MD        Today, Patient Was Seen By: Lisandra Wilson MD    ** Please Note: Dictation voice to text software may have been used in the creation of this document   **

## 2019-05-19 NOTE — ASSESSMENT & PLAN NOTE
· With mildly elevated creatinine of 1 56 on admission  · Acute renal failure resolved  · Creatinine now at baseline at 1 1 and below baseline

## 2019-05-19 NOTE — PROGRESS NOTES
Problem: Potential for Falls  Goal: Patient will remain free of falls  Description  INTERVENTIONS:  - Assess patient frequently for physical needs  -  Identify cognitive and physical deficits and behaviors that affect risk of falls    -  [place] fall precautions as indicated by assessment   - Educate patient/family on patient safety including physical limitations  - Instruct patient to call for assistance with activity based on assessment  - Modify environment to reduce risk of injury  - Consider OT/PT consult to assist with strengthening/mobility  Outcome: Progressing     Problem: PAIN - ADULT  Goal: Verbalizes/displays adequate comfort level or baseline comfort level  Description  Interventions:  - Encourage patient to monitor pain and request assistance  - Assess pain using appropriate pain scale  - Administer analgesics based on type and severity of pain and evaluate response  - Implement non-pharmacological measures as appropriate and evaluate response  - Consider cultural and social influences on pain and pain management  - Notify physician/advanced practitioner if interventions unsuccessful or patient reports new pain  Outcome: Progressing     Problem: DISCHARGE PLANNING  Goal: Discharge to home or other facility with appropriate resources  Description  INTERVENTIONS:  - Identify barriers to discharge w/patient and caregiver  - Arrange for needed discharge resources and transportation as appropriate  - Identify discharge learning needs (meds, wound care, etc )  - Arrange for interpretive services to assist at discharge as needed  - Refer to Case Management Department for coordinating discharge planning if the patient needs post-hospital services based on physician/advanced practitioner order or complex needs related to functional status, cognitive ability, or social support system  Outcome: Progressing     Problem: CARDIOVASCULAR - ADULT  Goal: Maintains optimal cardiac output and hemodynamic stability  Description  INTERVENTIONS:  - Monitor I/O, vital signs and rhythm  - Monitor for S/S and trends of decreased cardiac output i e  bleeding, hypotension  - Administer and titrate ordered vasoactive medications to optimize hemodynamic stability  - Assess quality of pulses, skin color and temperature  - Assess for signs of decreased coronary artery perfusion - ex   Angina  - Instruct patient to report change in severity of symptoms  Outcome: Progressing     Problem: RESPIRATORY - ADULT  Goal: Achieves optimal ventilation and oxygenation  Description  INTERVENTIONS:  - Assess for changes in respiratory status  - Assess for changes in mentation and behavior  - Position to facilitate oxygenation and minimize respiratory effort  - Oxygen administration by appropriate delivery method based on oxygen saturation (per order) or ABGs  - Initiate smoking cessation education as indicated  - Encourage broncho-pulmonary hygiene including cough, deep breathe, Incentive Spirometry  - Assess the need for suctioning and aspirate as needed  - Assess and instruct to report SOB or any respiratory difficulty  - Respiratory Therapy support as indicated  Outcome: Progressing     Problem: INFECTION - ADULT  Goal: Absence or prevention of progression during hospitalization  Description  INTERVENTIONS:  - Assess and monitor for signs and symptoms of infection  - Monitor lab/diagnostic results  - Monitor all insertion sites, i e  indwelling lines, tubes, and drains  - Monitor endotracheal (as able) and nasal secretions for changes in amount and color  - [place] appropriate cooling/warming therapies per order  - Administer medications as ordered  - Instruct and encourage patient and family to use good hand hygiene technique  - Identify and instruct in appropriate isolation precautions for identified infection/condition  Outcome: Progressing  Goal: Absence of fever/infection during neutropenic period  Description  INTERVENTIONS:  - Monitor WBC  - Implement neutropenic guidelines  Outcome: Progressing     Problem: SAFETY ADULT  Goal: Patient will remain free of falls  Description  INTERVENTIONS:  - Assess patient frequently for physical needs  -  Identify cognitive and physical deficits and behaviors that affect risk of falls    -  [place] fall precautions as indicated by assessment   - Educate patient/family on patient safety including physical limitations  - Instruct patient to call for assistance with activity based on assessment  - Modify environment to reduce risk of injury  - Consider OT/PT consult to assist with strengthening/mobility  Outcome: Progressing  Goal: Maintain or return to baseline ADL function  Description  INTERVENTIONS:  -  Assess patient's ability to carry out ADLs; assess patient's baseline for ADL function and identify physical deficits which impact ability to perform ADLs (bathing, care of mouth/teeth, toileting, grooming, dressing, etc )  - Assess/evaluate cause of self-care deficits   - Assess range of motion  - Assess patient's mobility; develop plan if impaired  - Assess patient's need for assistive devices and provide as appropriate  - Encourage maximum independence but intervene and supervise when necessary  ¯            Involve family in performance of ADLs  ¯            Assess for home care needs following discharge   ¯            Request OT consult to assist with ADL evaluation and planning for discharge  ¯            Provide patient education as appropriate  Outcome: Progressing  Goal: Maintain or return mobility status to optimal level  Description  INTERVENTIONS:  - Assess patient's baseline mobility status (ambulation, transfers, stairs, etc )    - Identify cognitive and physical deficits and behaviors that affect mobility  - Identify mobility aids required to assist with transfers and/or ambulation (gait belt, sit-to-stand, lift, walker, cane, etc )  - [place] fall precautions as indicated by assessment  - Record patient progress and toleration of activity level on Mobility SBAR; progress patient to next Phase/Stage  - Instruct patient to call for assistance with activity based on assessment  - Request Rehabilitation consult to assist with strengthening/weightbearing, etc   Outcome: Progressing     Problem: Knowledge Deficit  Goal: Patient/family/caregiver demonstrates understanding of disease process, treatment plan, medications, and discharge instructions  Description  Complete learning assessment and assess knowledge base    Interventions:  - Provide teaching at level of understanding  - Provide teaching via preferred learning methods  Outcome: Progressing     Problem: METABOLIC, FLUID AND ELECTROLYTES - ADULT  Goal: Electrolytes maintained within normal limits  Description  INTERVENTIONS:  - Monitor labs and assess patient for signs and symptoms of electrolyte imbalances  - Administer electrolyte replacement as ordered  - Monitor response to electrolyte replacements, including repeat lab results as appropriate  - Instruct patient on fluid and nutrition as appropriate  Outcome: Progressing  Goal: Fluid balance maintained  Description  INTERVENTIONS:  - Monitor labs and assess for signs and symptoms of volume excess or deficit  - Monitor I/O and WT  - Instruct patient on fluid and nutrition as appropriate  Outcome: Progressing  Goal: Glucose maintained within target range  Description  INTERVENTIONS:  - Monitor Blood Glucose as ordered  - Assess for signs and symptoms of hyperglycemia and hypoglycemia  - Administer ordered medications to maintain glucose within target range  - Assess nutritional intake and initiate nutrition service referral as needed  Outcome: Progressing

## 2019-05-19 NOTE — ASSESSMENT & PLAN NOTE
· With volume overload and possible flash pulmonary edema related to his valvular heart disease which precipitated his admission to the hospital    · Received several doses of intravenous Lasix and volume status is now stable  · Lung auscultation is clear  · Hold further diuretics in anticipation for cardiac catheterization tomorrow  · Continue metoprolol 50 b i d

## 2019-05-19 NOTE — PLAN OF CARE
Problem: Potential for Falls  Goal: Patient will remain free of falls  Description  INTERVENTIONS:  - Assess patient frequently for physical needs  -  Identify cognitive and physical deficits and behaviors that affect risk of falls    -  [place] fall precautions as indicated by assessment   - Educate patient/family on patient safety including physical limitations  - Instruct patient to call for assistance with activity based on assessment  - Modify environment to reduce risk of injury  - Consider OT/PT consult to assist with strengthening/mobility  Outcome: Progressing     Problem: PAIN - ADULT  Goal: Verbalizes/displays adequate comfort level or baseline comfort level  Description  Interventions:  - Encourage patient to monitor pain and request assistance  - Assess pain using appropriate pain scale  - Administer analgesics based on type and severity of pain and evaluate response  - Implement non-pharmacological measures as appropriate and evaluate response  - Consider cultural and social influences on pain and pain management  - Notify physician/advanced practitioner if interventions unsuccessful or patient reports new pain  Outcome: Progressing     Problem: DISCHARGE PLANNING  Goal: Discharge to home or other facility with appropriate resources  Description  INTERVENTIONS:  - Identify barriers to discharge w/patient and caregiver  - Arrange for needed discharge resources and transportation as appropriate  - Identify discharge learning needs (meds, wound care, etc )  - Arrange for interpretive services to assist at discharge as needed  - Refer to Case Management Department for coordinating discharge planning if the patient needs post-hospital services based on physician/advanced practitioner order or complex needs related to functional status, cognitive ability, or social support system  Outcome: Progressing     Problem: CARDIOVASCULAR - ADULT  Goal: Maintains optimal cardiac output and hemodynamic stability  Description  INTERVENTIONS:  - Monitor I/O, vital signs and rhythm  - Monitor for S/S and trends of decreased cardiac output i e  bleeding, hypotension  - Administer and titrate ordered vasoactive medications to optimize hemodynamic stability  - Assess quality of pulses, skin color and temperature  - Assess for signs of decreased coronary artery perfusion - ex   Angina  - Instruct patient to report change in severity of symptoms  Outcome: Progressing     Problem: RESPIRATORY - ADULT  Goal: Achieves optimal ventilation and oxygenation  Description  INTERVENTIONS:  - Assess for changes in respiratory status  - Assess for changes in mentation and behavior  - Position to facilitate oxygenation and minimize respiratory effort  - Oxygen administration by appropriate delivery method based on oxygen saturation (per order) or ABGs  - Initiate smoking cessation education as indicated  - Encourage broncho-pulmonary hygiene including cough, deep breathe, Incentive Spirometry  - Assess the need for suctioning and aspirate as needed  - Assess and instruct to report SOB or any respiratory difficulty  - Respiratory Therapy support as indicated  Outcome: Progressing     Problem: INFECTION - ADULT  Goal: Absence or prevention of progression during hospitalization  Description  INTERVENTIONS:  - Assess and monitor for signs and symptoms of infection  - Monitor lab/diagnostic results  - Monitor all insertion sites, i e  indwelling lines, tubes, and drains  - Monitor endotracheal (as able) and nasal secretions for changes in amount and color  - [place] appropriate cooling/warming therapies per order  - Administer medications as ordered  - Instruct and encourage patient and family to use good hand hygiene technique  - Identify and instruct in appropriate isolation precautions for identified infection/condition  Outcome: Progressing  Goal: Absence of fever/infection during neutropenic period  Description  INTERVENTIONS:  - Monitor WBC  - Implement neutropenic guidelines  Outcome: Progressing     Problem: SAFETY ADULT  Goal: Patient will remain free of falls  Description  INTERVENTIONS:  - Assess patient frequently for physical needs  -  Identify cognitive and physical deficits and behaviors that affect risk of falls    -  [place] fall precautions as indicated by assessment   - Educate patient/family on patient safety including physical limitations  - Instruct patient to call for assistance with activity based on assessment  - Modify environment to reduce risk of injury  - Consider OT/PT consult to assist with strengthening/mobility  Outcome: Progressing  Goal: Maintain or return to baseline ADL function  Description  INTERVENTIONS:  -  Assess patient's ability to carry out ADLs; assess patient's baseline for ADL function and identify physical deficits which impact ability to perform ADLs (bathing, care of mouth/teeth, toileting, grooming, dressing, etc )  - Assess/evaluate cause of self-care deficits   - Assess range of motion  - Assess patient's mobility; develop plan if impaired  - Assess patient's need for assistive devices and provide as appropriate  - Encourage maximum independence but intervene and supervise when necessary  ¯ Involve family in performance of ADLs  ¯ Assess for home care needs following discharge   ¯ Request OT consult to assist with ADL evaluation and planning for discharge  ¯ Provide patient education as appropriate  Outcome: Progressing  Goal: Maintain or return mobility status to optimal level  Description  INTERVENTIONS:  - Assess patient's baseline mobility status (ambulation, transfers, stairs, etc )    - Identify cognitive and physical deficits and behaviors that affect mobility  - Identify mobility aids required to assist with transfers and/or ambulation (gait belt, sit-to-stand, lift, walker, cane, etc )  - [place] fall precautions as indicated by assessment  - Record patient progress and toleration of activity level on Mobility SBAR; progress patient to next Phase/Stage  - Instruct patient to call for assistance with activity based on assessment  - Request Rehabilitation consult to assist with strengthening/weightbearing, etc   Outcome: Progressing     Problem: Knowledge Deficit  Goal: Patient/family/caregiver demonstrates understanding of disease process, treatment plan, medications, and discharge instructions  Description  Complete learning assessment and assess knowledge base    Interventions:  - Provide teaching at level of understanding  - Provide teaching via preferred learning methods  Outcome: Progressing     Problem: METABOLIC, FLUID AND ELECTROLYTES - ADULT  Goal: Electrolytes maintained within normal limits  Description  INTERVENTIONS:  - Monitor labs and assess patient for signs and symptoms of electrolyte imbalances  - Administer electrolyte replacement as ordered  - Monitor response to electrolyte replacements, including repeat lab results as appropriate  - Instruct patient on fluid and nutrition as appropriate  Outcome: Progressing  Goal: Fluid balance maintained  Description  INTERVENTIONS:  - Monitor labs and assess for signs and symptoms of volume excess or deficit  - Monitor I/O and WT  - Instruct patient on fluid and nutrition as appropriate  Outcome: Progressing  Goal: Glucose maintained within target range  Description  INTERVENTIONS:  - Monitor Blood Glucose as ordered  - Assess for signs and symptoms of hyperglycemia and hypoglycemia  - Administer ordered medications to maintain glucose within target range  - Assess nutritional intake and initiate nutrition service referral as needed  Outcome: Progressing

## 2019-05-20 ENCOUNTER — APPOINTMENT (INPATIENT)
Dept: NON INVASIVE DIAGNOSTICS | Facility: HOSPITAL | Age: 64
DRG: 286 | End: 2019-05-20
Attending: INTERNAL MEDICINE
Payer: MEDICARE

## 2019-05-20 LAB
BACTERIA BLD CULT: NORMAL
BACTERIA BLD CULT: NORMAL
GLUCOSE SERPL-MCNC: 127 MG/DL (ref 65–140)
GLUCOSE SERPL-MCNC: 185 MG/DL (ref 65–140)
GLUCOSE SERPL-MCNC: 208 MG/DL (ref 65–140)

## 2019-05-20 PROCEDURE — C1769 GUIDE WIRE: HCPCS | Performed by: INTERNAL MEDICINE

## 2019-05-20 PROCEDURE — 93458 L HRT ARTERY/VENTRICLE ANGIO: CPT | Performed by: INTERNAL MEDICINE

## 2019-05-20 PROCEDURE — 99152 MOD SED SAME PHYS/QHP 5/>YRS: CPT | Performed by: INTERNAL MEDICINE

## 2019-05-20 PROCEDURE — 82948 REAGENT STRIP/BLOOD GLUCOSE: CPT

## 2019-05-20 PROCEDURE — 99232 SBSQ HOSP IP/OBS MODERATE 35: CPT | Performed by: HOSPITALIST

## 2019-05-20 PROCEDURE — 94760 N-INVAS EAR/PLS OXIMETRY 1: CPT

## 2019-05-20 PROCEDURE — C1894 INTRO/SHEATH, NON-LASER: HCPCS | Performed by: INTERNAL MEDICINE

## 2019-05-20 PROCEDURE — 94660 CPAP INITIATION&MGMT: CPT

## 2019-05-20 RX ORDER — VERAPAMIL HYDROCHLORIDE 2.5 MG/ML
INJECTION, SOLUTION INTRAVENOUS CODE/TRAUMA/SEDATION MEDICATION
Status: COMPLETED | OUTPATIENT
Start: 2019-05-20 | End: 2019-05-20

## 2019-05-20 RX ORDER — HEPARIN SODIUM 1000 [USP'U]/ML
INJECTION, SOLUTION INTRAVENOUS; SUBCUTANEOUS CODE/TRAUMA/SEDATION MEDICATION
Status: COMPLETED | OUTPATIENT
Start: 2019-05-20 | End: 2019-05-20

## 2019-05-20 RX ORDER — FUROSEMIDE 80 MG
80 TABLET ORAL
Status: COMPLETED | OUTPATIENT
Start: 2019-05-21 | End: 2019-05-22

## 2019-05-20 RX ORDER — LIDOCAINE HYDROCHLORIDE 10 MG/ML
INJECTION, SOLUTION INFILTRATION; PERINEURAL CODE/TRAUMA/SEDATION MEDICATION
Status: COMPLETED | OUTPATIENT
Start: 2019-05-20 | End: 2019-05-20

## 2019-05-20 RX ORDER — FENTANYL CITRATE 50 UG/ML
INJECTION, SOLUTION INTRAMUSCULAR; INTRAVENOUS CODE/TRAUMA/SEDATION MEDICATION
Status: COMPLETED | OUTPATIENT
Start: 2019-05-20 | End: 2019-05-20

## 2019-05-20 RX ORDER — SODIUM CHLORIDE 9 MG/ML
INJECTION, SOLUTION INTRAVENOUS
Status: COMPLETED | OUTPATIENT
Start: 2019-05-20 | End: 2019-05-20

## 2019-05-20 RX ORDER — NITROGLYCERIN 20 MG/100ML
INJECTION INTRAVENOUS CODE/TRAUMA/SEDATION MEDICATION
Status: COMPLETED | OUTPATIENT
Start: 2019-05-20 | End: 2019-05-20

## 2019-05-20 RX ORDER — SODIUM CHLORIDE 9 MG/ML
75 INJECTION, SOLUTION INTRAVENOUS CONTINUOUS
Status: DISPENSED | OUTPATIENT
Start: 2019-05-20 | End: 2019-05-20

## 2019-05-20 RX ORDER — MIDAZOLAM HYDROCHLORIDE 1 MG/ML
INJECTION INTRAMUSCULAR; INTRAVENOUS CODE/TRAUMA/SEDATION MEDICATION
Status: COMPLETED | OUTPATIENT
Start: 2019-05-20 | End: 2019-05-20

## 2019-05-20 RX ADMIN — PRAVASTATIN SODIUM 20 MG: 20 TABLET ORAL at 16:54

## 2019-05-20 RX ADMIN — INSULIN LISPRO 1 UNITS: 100 INJECTION, SOLUTION INTRAVENOUS; SUBCUTANEOUS at 16:55

## 2019-05-20 RX ADMIN — LIDOCAINE HYDROCHLORIDE 1 ML: 10 INJECTION, SOLUTION INFILTRATION; PERINEURAL at 09:11

## 2019-05-20 RX ADMIN — INSULIN LISPRO 1 UNITS: 100 INJECTION, SOLUTION INTRAVENOUS; SUBCUTANEOUS at 13:01

## 2019-05-20 RX ADMIN — IOHEXOL 75 ML: 350 INJECTION, SOLUTION INTRAVENOUS at 09:20

## 2019-05-20 RX ADMIN — VERAPAMIL HYDROCHLORIDE 2.5 MG: 2.5 INJECTION INTRAVENOUS at 09:14

## 2019-05-20 RX ADMIN — NITROGLYCERIN 200 MCG: 20 INJECTION INTRAVENOUS at 09:13

## 2019-05-20 RX ADMIN — AMLODIPINE BESYLATE 5 MG: 5 TABLET ORAL at 09:45

## 2019-05-20 RX ADMIN — SODIUM CHLORIDE 100 ML/HR: 0.9 INJECTION, SOLUTION INTRAVENOUS at 09:00

## 2019-05-20 RX ADMIN — SITAGLIPTIN 25 MG: 50 TABLET, FILM COATED ORAL at 09:44

## 2019-05-20 RX ADMIN — HEPARIN SODIUM 4000 UNITS: 1000 INJECTION INTRAVENOUS; SUBCUTANEOUS at 09:13

## 2019-05-20 RX ADMIN — TAMSULOSIN HYDROCHLORIDE 0.4 MG: 0.4 CAPSULE ORAL at 16:54

## 2019-05-20 RX ADMIN — MIDAZOLAM 2 MG: 1 INJECTION INTRAMUSCULAR; INTRAVENOUS at 09:08

## 2019-05-20 RX ADMIN — METOPROLOL TARTRATE 50 MG: 50 TABLET, FILM COATED ORAL at 21:33

## 2019-05-20 RX ADMIN — SODIUM CHLORIDE 75 ML/HR: 0.9 INJECTION, SOLUTION INTRAVENOUS at 09:49

## 2019-05-20 RX ADMIN — METOPROLOL TARTRATE 50 MG: 50 TABLET, FILM COATED ORAL at 09:44

## 2019-05-20 RX ADMIN — ALLOPURINOL 300 MG: 100 TABLET ORAL at 09:44

## 2019-05-20 RX ADMIN — FENTANYL CITRATE 50 MCG: 50 INJECTION, SOLUTION INTRAMUSCULAR; INTRAVENOUS at 09:08

## 2019-05-20 RX ADMIN — LIDOCAINE 1 PATCH: 50 PATCH TOPICAL at 09:46

## 2019-05-20 RX ADMIN — BETAMETHASONE VALERATE 1 APPLICATION: 1 CREAM TOPICAL at 09:49

## 2019-05-20 NOTE — PROGRESS NOTES
Progress Note - Ezra Mcmillan 1955, 70 y o  male MRN: [id number]    Unit/Bed#: E4 -01 Encounter: [id number]    Primary Care Provider: Romina Larsen DO   Date and time admitted to hospital: 5/15/2019 12:50 PM        Acute pulmonary edema [facility])  Assessment & Plan  Doing well today  Going for ELBERT tomorrow    Acute diastolic (congestive) heart failure ([facility] [address] )  Assessment & Plan  For ELBERT tomorrow      * Acute respiratory failure with hypoxia ([facility] [address] )  Assessment & Plan  Getting back to normal breathing  Subjective:   Doing well today  No sob  No chest pain      Objective:     Vitals:   Temp (24hrs), Av 4 °F (36 9 °C), Min:97 9 °F (36 6 °C), Max:99 1 °F (37 3 °C)    Temp:  [97 9 °F (36 6 °C)-99 1 °F (37 3 °C)] 97 9 °F (36 6 °C)  HR:  [59-71] 61  Resp:  [18] 18  BP: (136-158)/(57-73) 147/68  SpO2:  [92 %-99 %] 94 %  Body mass index is 29 8 kg/m²  Input and Output Summary (last 24 hours): Intake/Output Summary (Last 24 hours) at 2019 1546  Last data filed at 2019 0657  Gross per 24 hour   Intake 620 ml   Output 925 ml   Net -305 ml       Physical Exam:     Physical Exam   HENT:   Head: Normocephalic and atraumatic  Eyes: Pupils are equal, round, and reactive to light  EOM are normal    Cardiovascular: Normal rate and regular rhythm  Exam reveals no gallop and no friction rub  No murmur heard  Pulmonary/Chest: Effort normal and breath sounds normal  He has no wheezes  He has no rales  Abdominal: Soft  Bowel sounds are normal  There is no tenderness  Musculoskeletal: He exhibits no edema  Nursing note and vitals reviewed              Additional Data:     Labs:    Results from last 7 days   Lab Units 19  0553   WBC Thousand/uL 11 07*   HEMOGLOBIN g/dL 10 2*   HEMATOCRIT % 31 9*   PLATELETS Thousands/uL 337   NEUTROS PCT % 70   LYMPHS PCT % 9*   MONOS PCT % 6   EOS PCT % 14*     Results from last 7 days   Lab Units 19  0644  05/15/19  1314   POTASSIUM mmol/L 4 4   < > 5 3   CHLORIDE mmol/L 104   < > 101   CO2 mmol/L 23   < > 17*   BUN mg/dL 33*   < > 51*   CREATININE mg/dL 1 16   < > 1 56*   CALCIUM mg/dL 9 9   < > 9 8   ALK PHOS U/L  --   --  109   ALT U/L  --   --  29   AST U/L  --   --  26    < > = values in this interval not displayed  Results from last 7 days   Lab Units 05/15/19  1314   INR  1 12     Results from last 7 days   Lab Units 05/20/19  1152 05/20/19  0721 05/19/19  2033 05/19/19  1525 05/19/19  1138 05/19/19  0710 05/18/19  1547 05/18/19  1116 05/18/19  0736 05/17/19  1615 05/17/19  1118 05/17/19  0708   POC GLUCOSE mg/dl 208* 127 125 149* 206* 130 175* 265* 116 168* 242* 109               * I Have Reviewed All Lab Data     Recent Cultures (last 7 days):     Results from last 7 days   Lab Units 05/15/19  1317 05/15/19  1314   BLOOD CULTURE  No Growth After 4 Days  No Growth After 4 Days  Last 24 Hours Medication List:     Current Facility-Administered Medications:  acetaminophen 650 mg Oral Q6H PRN Laura Booker PA-C   allopurinol 300 mg Oral QAM Tomy Bird MD   amLODIPine 5 mg Oral Daily Soraya Price MD   betamethasone valerate 1 application Topical Daily Tomy Bird MD   insulin lispro 1-5 Units Subcutaneous TID [facility] Tomy Bird MD   lidocaine 1 patch Topical Daily Lam Meza PA-C   metoprolol tartrate 50 mg Oral Q12H [address] Tomy Bird MD   pravastatin 20 mg Oral Daily With Amanda Jett MD   sitaGLIPtin 25 mg Oral Daily Tomy Bird MD   tamsulosin 0 4 mg Oral Daily With Amanda Jett MD         VTE Pharmacologic Prophylaxis:   Pharmacologic: none with procedures      Current Length of Stay: 5 day(s)    Current Patient Status: Inpatient       Discharge Plan: likely home soon    Code Status: Level 1 - Full Code           Today, Patient Was Seen By: Antony Ulloa DO    ** Please Note: Dictation voice to text software may have been used in the creation of this document   **

## 2019-05-20 NOTE — PLAN OF CARE
Problem: Potential for Falls  Goal: Patient will remain free of falls  Description  INTERVENTIONS:  - Assess patient frequently for physical needs  -  Identify cognitive and physical deficits and behaviors that affect risk of falls    -  [place] fall precautions as indicated by assessment   - Educate patient/family on patient safety including physical limitations  - Instruct patient to call for assistance with activity based on assessment  - Modify environment to reduce risk of injury  - Consider OT/PT consult to assist with strengthening/mobility  Outcome: Progressing     Problem: PAIN - ADULT  Goal: Verbalizes/displays adequate comfort level or baseline comfort level  Description  Interventions:  - Encourage patient to monitor pain and request assistance  - Assess pain using appropriate pain scale  - Administer analgesics based on type and severity of pain and evaluate response  - Implement non-pharmacological measures as appropriate and evaluate response  - Consider cultural and social influences on pain and pain management  - Notify physician/advanced practitioner if interventions unsuccessful or patient reports new pain  Outcome: Progressing     Problem: DISCHARGE PLANNING  Goal: Discharge to home or other facility with appropriate resources  Description  INTERVENTIONS:  - Identify barriers to discharge w/patient and caregiver  - Arrange for needed discharge resources and transportation as appropriate  - Identify discharge learning needs (meds, wound care, etc )  - Arrange for interpretive services to assist at discharge as needed  - Refer to Case Management Department for coordinating discharge planning if the patient needs post-hospital services based on physician/advanced practitioner order or complex needs related to functional status, cognitive ability, or social support system  Outcome: Progressing     Problem: CARDIOVASCULAR - ADULT  Goal: Maintains optimal cardiac output and hemodynamic stability  Description  INTERVENTIONS:  - Monitor I/O, vital signs and rhythm  - Monitor for S/S and trends of decreased cardiac output i e  bleeding, hypotension  - Administer and titrate ordered vasoactive medications to optimize hemodynamic stability  - Assess quality of pulses, skin color and temperature  - Assess for signs of decreased coronary artery perfusion - ex   Angina  - Instruct patient to report change in severity of symptoms  Outcome: Progressing     Problem: RESPIRATORY - ADULT  Goal: Achieves optimal ventilation and oxygenation  Description  INTERVENTIONS:  - Assess for changes in respiratory status  - Assess for changes in mentation and behavior  - Position to facilitate oxygenation and minimize respiratory effort  - Oxygen administration by appropriate delivery method based on oxygen saturation (per order) or ABGs  - Initiate smoking cessation education as indicated  - Encourage broncho-pulmonary hygiene including cough, deep breathe, Incentive Spirometry  - Assess the need for suctioning and aspirate as needed  - Assess and instruct to report SOB or any respiratory difficulty  - Respiratory Therapy support as indicated  Outcome: Progressing     Problem: INFECTION - ADULT  Goal: Absence or prevention of progression during hospitalization  Description  INTERVENTIONS:  - Assess and monitor for signs and symptoms of infection  - Monitor lab/diagnostic results  - Monitor all insertion sites, i e  indwelling lines, tubes, and drains  - Monitor endotracheal (as able) and nasal secretions for changes in amount and color  - [place] appropriate cooling/warming therapies per order  - Administer medications as ordered  - Instruct and encourage patient and family to use good hand hygiene technique  - Identify and instruct in appropriate isolation precautions for identified infection/condition  Outcome: Progressing  Goal: Absence of fever/infection during neutropenic period  Description  INTERVENTIONS:  - Monitor WBC  - Implement neutropenic guidelines  Outcome: Progressing     Problem: SAFETY ADULT  Goal: Patient will remain free of falls  Description  INTERVENTIONS:  - Assess patient frequently for physical needs  -  Identify cognitive and physical deficits and behaviors that affect risk of falls    -  [place] fall precautions as indicated by assessment   - Educate patient/family on patient safety including physical limitations  - Instruct patient to call for assistance with activity based on assessment  - Modify environment to reduce risk of injury  - Consider OT/PT consult to assist with strengthening/mobility  Outcome: Progressing  Goal: Maintain or return to baseline ADL function  Description  INTERVENTIONS:  -  Assess patient's ability to carry out ADLs; assess patient's baseline for ADL function and identify physical deficits which impact ability to perform ADLs (bathing, care of mouth/teeth, toileting, grooming, dressing, etc )  - Assess/evaluate cause of self-care deficits   - Assess range of motion  - Assess patient's mobility; develop plan if impaired  - Assess patient's need for assistive devices and provide as appropriate  - Encourage maximum independence but intervene and supervise when necessary  ¯ Involve family in performance of ADLs  ¯ Assess for home care needs following discharge   ¯ Request OT consult to assist with ADL evaluation and planning for discharge  ¯ Provide patient education as appropriate  Outcome: Progressing  Goal: Maintain or return mobility status to optimal level  Description  INTERVENTIONS:  - Assess patient's baseline mobility status (ambulation, transfers, stairs, etc )    - Identify cognitive and physical deficits and behaviors that affect mobility  - Identify mobility aids required to assist with transfers and/or ambulation (gait belt, sit-to-stand, lift, walker, cane, etc )  - [place] fall precautions as indicated by assessment  - Record patient progress and toleration of activity level on Mobility SBAR; progress patient to next Phase/Stage  - Instruct patient to call for assistance with activity based on assessment  - Request Rehabilitation consult to assist with strengthening/weightbearing, etc   Outcome: Progressing     Problem: Knowledge Deficit  Goal: Patient/family/caregiver demonstrates understanding of disease process, treatment plan, medications, and discharge instructions  Description  Complete learning assessment and assess knowledge base    Interventions:  - Provide teaching at level of understanding  - Provide teaching via preferred learning methods  Outcome: Progressing     Problem: METABOLIC, FLUID AND ELECTROLYTES - ADULT  Goal: Electrolytes maintained within normal limits  Description  INTERVENTIONS:  - Monitor labs and assess patient for signs and symptoms of electrolyte imbalances  - Administer electrolyte replacement as ordered  - Monitor response to electrolyte replacements, including repeat lab results as appropriate  - Instruct patient on fluid and nutrition as appropriate  Outcome: Progressing  Goal: Fluid balance maintained  Description  INTERVENTIONS:  - Monitor labs and assess for signs and symptoms of volume excess or deficit  - Monitor I/O and WT  - Instruct patient on fluid and nutrition as appropriate  Outcome: Progressing  Goal: Glucose maintained within target range  Description  INTERVENTIONS:  - Monitor Blood Glucose as ordered  - Assess for signs and symptoms of hyperglycemia and hypoglycemia  - Administer ordered medications to maintain glucose within target range  - Assess nutritional intake and initiate nutrition service referral as needed  Outcome: Progressing

## 2019-05-20 NOTE — QUICK NOTE
Saw patient and explained cath findings  No right heart cath due to latex allergy  Will do ELBERT in AM to assess MV disease    Suspect severe stenosis  Repeat BMP in AM  Start lasix 80 mg BID tomorrow  Anticipate DC home perhaps tomorrow with ensuing FU in very near future with CT surgery for evaluation of  MVR +/- AVR    Agueda Zavala MD

## 2019-05-20 NOTE — SOCIAL WORK
LOS: 5; GLOS 4 1  PT IS A BUNDLE  PT IS A 30-DAY READMISSION  Pt was admitted with acute respiratory failure with hypoxia  SW m/w the pt and his wife, Ligia Fulton to complete a general assessment and discuss discharge planning  Pt lives with his wife, son, DIL and grandchildren in a 2 story house  Pt was independent with all ADLs and ambulates without an AD  Pt drives  During pt's last admission, pt was discharge on 2L of home O2 (supplied by [facility])   Pt stated he uses it as needed  Pt is currently refusing any VNA  Pt denies any MH hx, substance abuse hx, any legal issues or any SNF admissions  Pt is not a   PCP: Dr Maryanne Levy  Preferred Pharmacy: [facility] in [place]  Pt's family will transport the pt home when discharged  Pt does not have any questions or concerns  SW will continue to follow

## 2019-05-21 ENCOUNTER — ANESTHESIA EVENT (INPATIENT)
Dept: NON INVASIVE DIAGNOSTICS | Facility: HOSPITAL | Age: 64
DRG: 286 | End: 2019-05-21
Payer: MEDICARE

## 2019-05-21 ENCOUNTER — APPOINTMENT (INPATIENT)
Dept: RADIOLOGY | Facility: HOSPITAL | Age: 64
DRG: 286 | End: 2019-05-21
Payer: MEDICARE

## 2019-05-21 LAB
ANION GAP SERPL CALCULATED.3IONS-SCNC: 10 MMOL/L (ref 4–13)
BASOPHILS # BLD AUTO: 0.09 THOUSANDS/ΜL (ref 0–0.1)
BASOPHILS NFR BLD AUTO: 1 % (ref 0–1)
BUN SERPL-MCNC: 33 MG/DL (ref 5–25)
CALCIUM SERPL-MCNC: 9.3 MG/DL (ref 8.3–10.1)
CHLORIDE SERPL-SCNC: 105 MMOL/L (ref 100–108)
CO2 SERPL-SCNC: 24 MMOL/L (ref 21–32)
CREAT SERPL-MCNC: 1.32 MG/DL (ref 0.6–1.3)
EOSINOPHIL # BLD AUTO: 0.97 THOUSAND/ΜL (ref 0–0.61)
EOSINOPHIL NFR BLD AUTO: 10 % (ref 0–6)
ERYTHROCYTE [DISTWIDTH] IN BLOOD BY AUTOMATED COUNT: 15 % (ref 11.6–15.1)
GFR SERPL CREATININE-BSD FRML MDRD: 56 ML/MIN/1.73SQ M
GLUCOSE SERPL-MCNC: 109 MG/DL (ref 65–140)
GLUCOSE SERPL-MCNC: 147 MG/DL (ref 65–140)
GLUCOSE SERPL-MCNC: 158 MG/DL (ref 65–140)
GLUCOSE SERPL-MCNC: 210 MG/DL (ref 65–140)
HCT VFR BLD AUTO: 29.9 % (ref 36.5–49.3)
HGB BLD-MCNC: 9.9 G/DL (ref 12–17)
IMM GRANULOCYTES # BLD AUTO: 0.03 THOUSAND/UL (ref 0–0.2)
IMM GRANULOCYTES NFR BLD AUTO: 0 % (ref 0–2)
LYMPHOCYTES # BLD AUTO: 1.25 THOUSANDS/ΜL (ref 0.6–4.47)
LYMPHOCYTES NFR BLD AUTO: 13 % (ref 14–44)
MAGNESIUM SERPL-MCNC: 1.9 MG/DL (ref 1.6–2.6)
MCH RBC QN AUTO: 30.2 PG (ref 26.8–34.3)
MCHC RBC AUTO-ENTMCNC: 33.1 G/DL (ref 31.4–37.4)
MCV RBC AUTO: 91 FL (ref 82–98)
MONOCYTES # BLD AUTO: 0.69 THOUSAND/ΜL (ref 0.17–1.22)
MONOCYTES NFR BLD AUTO: 7 % (ref 4–12)
NEUTROPHILS # BLD AUTO: 6.54 THOUSANDS/ΜL (ref 1.85–7.62)
NEUTS SEG NFR BLD AUTO: 69 % (ref 43–75)
NRBC BLD AUTO-RTO: 0 /100 WBCS
PLATELET # BLD AUTO: 274 THOUSANDS/UL (ref 149–390)
PMV BLD AUTO: 9.2 FL (ref 8.9–12.7)
POTASSIUM SERPL-SCNC: 4 MMOL/L (ref 3.5–5.3)
RBC # BLD AUTO: 3.28 MILLION/UL (ref 3.88–5.62)
SODIUM SERPL-SCNC: 139 MMOL/L (ref 136–145)
WBC # BLD AUTO: 9.57 THOUSAND/UL (ref 4.31–10.16)

## 2019-05-21 PROCEDURE — 85025 COMPLETE CBC W/AUTO DIFF WBC: CPT | Performed by: HOSPITALIST

## 2019-05-21 PROCEDURE — 94660 CPAP INITIATION&MGMT: CPT

## 2019-05-21 PROCEDURE — 99232 SBSQ HOSP IP/OBS MODERATE 35: CPT | Performed by: INTERNAL MEDICINE

## 2019-05-21 PROCEDURE — 99232 SBSQ HOSP IP/OBS MODERATE 35: CPT | Performed by: HOSPITALIST

## 2019-05-21 PROCEDURE — 82948 REAGENT STRIP/BLOOD GLUCOSE: CPT

## 2019-05-21 PROCEDURE — 94760 N-INVAS EAR/PLS OXIMETRY 1: CPT

## 2019-05-21 PROCEDURE — 80048 BASIC METABOLIC PNL TOTAL CA: CPT | Performed by: HOSPITALIST

## 2019-05-21 PROCEDURE — 71046 X-RAY EXAM CHEST 2 VIEWS: CPT

## 2019-05-21 PROCEDURE — 83735 ASSAY OF MAGNESIUM: CPT | Performed by: HOSPITALIST

## 2019-05-21 RX ADMIN — METOPROLOL TARTRATE 50 MG: 50 TABLET, FILM COATED ORAL at 08:37

## 2019-05-21 RX ADMIN — INSULIN LISPRO 1 UNITS: 100 INJECTION, SOLUTION INTRAVENOUS; SUBCUTANEOUS at 12:21

## 2019-05-21 RX ADMIN — INSULIN LISPRO 1 UNITS: 100 INJECTION, SOLUTION INTRAVENOUS; SUBCUTANEOUS at 17:09

## 2019-05-21 RX ADMIN — BETAMETHASONE VALERATE 1 APPLICATION: 1 CREAM TOPICAL at 08:40

## 2019-05-21 RX ADMIN — METOPROLOL TARTRATE 50 MG: 50 TABLET, FILM COATED ORAL at 20:37

## 2019-05-21 RX ADMIN — APIXABAN 5 MG: 5 TABLET, FILM COATED ORAL at 17:09

## 2019-05-21 RX ADMIN — ALLOPURINOL 300 MG: 100 TABLET ORAL at 08:37

## 2019-05-21 RX ADMIN — SITAGLIPTIN 25 MG: 50 TABLET, FILM COATED ORAL at 08:37

## 2019-05-21 RX ADMIN — FUROSEMIDE 80 MG: 80 TABLET ORAL at 08:37

## 2019-05-21 RX ADMIN — PRAVASTATIN SODIUM 20 MG: 20 TABLET ORAL at 17:09

## 2019-05-21 RX ADMIN — TAMSULOSIN HYDROCHLORIDE 0.4 MG: 0.4 CAPSULE ORAL at 17:09

## 2019-05-21 RX ADMIN — AMLODIPINE BESYLATE 5 MG: 5 TABLET ORAL at 08:37

## 2019-05-21 RX ADMIN — FUROSEMIDE 80 MG: 80 TABLET ORAL at 17:09

## 2019-05-21 NOTE — SOCIAL WORK
LOS 6D; GLOS: 4 1D  Pt is a CHF bundled pt (bundled notification given); 30 day readmission with recurrent CHF- pt compliant with medications and diet  Pt not medically cleared this day as needed ELBERT prior to d/c which is scheduled for 5/22  Anticipate d/c home thereafter pending results  LINDSEY#2 signed in it's anticipation  No needs anticipated at this time- pt is not home bound and therefore does not qualify for VNA

## 2019-05-21 NOTE — PROGRESS NOTES
Progress Note - Cardiology   Thuan Vallecillo 70 y o  male MRN: [id number]  Unit/Bed#: E4 -01 Encounter: [id number]        Asessment/Plan:  1  Chronic and acute diastolic CHF:  Now compensated  2  Mitral stenosis  3  Nonobstructive single-vessel CAD: S/p cath 5/20/19 50% mLAD lesion  4  Hypertension:  Mostly controlled with -160  5  History complete heart block - S/p PPM   6  History of PAF in the setting of sepsis (11/2018)  7  CKD  8  Diabetes mellitus    · ELBERT currently planned for tomorrow  · Eliquis being held - can resume tomorrow evening  · Patient now back on oral Lasix (currently 80 b i d  - had been 40 b i d  Prior to admission)  · Hypertension controlled on current regimen of amlodipine Lopressor, diuretic ~~> continue same  · Following ELBERT can consider possible discharge to home tomorrow      Subjective:  No complaints  Breathing comfortably in feels legs are the best they have looked in a long time    Vitals:  Vitals:    05/20/19 0600 05/21/19 0600   Weight: 94 2 kg (207 lb 10 8 oz) 92 8 kg (204 lb 9 4 oz)   ,  Vitals:    05/20/19 2144 05/20/19 2301 05/21/19 0600 05/21/19 0714   BP:  167/73  165/72   BP Location:  Left arm  Left arm   Pulse:  60  61   Resp:  18  18   Temp:  99 2 °F (37 3 °C)  97 6 °F (36 4 °C)   TempSrc:  Temporal  Tympanic   SpO2: 92% 94%  97%   Weight:   92 8 kg (204 lb 9 4 oz)        Exam:  General:  Alert, normally conversant pleasant and comfortable-appearing  Heart:  Regular with controlled rate  Unchanged apical and basilar murmur  Lungs:  Currently clear  Lower Limbs:  Trace edema of distal right lower extremity    1+ left lower extremity (chronic)             Medications:    Current Facility-Administered Medications:     acetaminophen (TYLENOL) tablet 650 mg, 650 mg, Oral, Q6H PRN, Ursula Meza PA-C, 650 mg at 05/16/19 1957    allopurinol (ZYLOPRIM) tablet 300 mg, 300 mg, Oral, QAM, Brenton Lopez MD, 300 mg at 05/21/19 0837    amLODIPine (NORVASC) tablet 5 mg, 5 mg, Oral, Daily, El Suarez MD, 5 mg at 05/21/19 1655    betamethasone valerate (VALISONE) 0 1 % cream 1 application, 1 application, Topical, Daily, Stephania Barros MD, 1 application at 14/66/37 0840    furosemide (LASIX) tablet 80 mg, 80 mg, Oral, BID (diuretic), El Suarez MD, 80 mg at 05/21/19 0837    insulin lispro (HumaLOG) 100 units/mL subcutaneous injection 1-5 Units, 1-5 Units, Subcutaneous, TID AC, 1 Units at 05/20/19 1655 **AND** Fingerstick Glucose (POCT), , , TID AC, Stephania Barros MD    lidocaine (LIDODERM) 5 % patch 1 patch, 1 patch, Topical, Daily, Carlos Orozco PA-C, Stopped at 05/21/19 8700    metoprolol tartrate (LOPRESSOR) tablet 50 mg, 50 mg, Oral, Q12H [address], Stephania Barros MD, 50 mg at 05/21/19 0837    pravastatin (PRAVACHOL) tablet 20 mg, 20 mg, Oral, Daily With Tuan Karimi MD, 20 mg at 05/20/19 1654    sitaGLIPtin (JANUVIA) tablet 25 mg, 25 mg, Oral, Daily, Stephania Barros MD, 25 mg at 05/21/19 0837    tamsulosin (FLOMAX) capsule 0 4 mg, 0 4 mg, Oral, Daily With Tuan Karimi MD, 0 4 mg at 05/20/19 1654      Labs/Data:        Results from last 7 days   Lab Units 05/21/19  0505 05/17/19  0553 05/16/19  0539   WBC Thousand/uL 9 57 11 07* 13 14*   HEMOGLOBIN g/dL 9 9* 10 2* 9 6*   HEMATOCRIT % 29 9* 31 9* 30 0*   PLATELETS Thousands/uL 274 337 342     Results from last 7 days   Lab Units 05/21/19  0505 05/19/19  0644 05/18/19  0507  05/15/19  1314   POTASSIUM mmol/L 4 0 4 4 4 7   < > 5 3   CHLORIDE mmol/L 105 104 107   < > 101   CO2 mmol/L 24 23 26   < > 17*   BUN mg/dL 33* 33* 36*   < > 51*   TROPONIN I ng/mL  --   --   --   --  0 02    < > = values in this interval not displayed

## 2019-05-21 NOTE — PROGRESS NOTES
Progress Note - Leslye Heimlich 1955, 70 y o  male MRN: [id number]    Unit/Bed#: E4 -01 Encounter: [id number]    Primary Care Provider: Tyler Matters, DO   Date and time admitted to hospital: 5/15/2019 12:50 PM        Acute pulmonary edema [facility])  Assessment & Plan  Doing well today  Going for ELBERT tomorrow  Weaned off of oxygen    Acute diastolic (congestive) heart failure (HCC)  Assessment & Plan  For ELBERT tomorrow  Likely has some mitral valve disease    * Acute respiratory failure with hypoxia ([address] )  Assessment & Plan  Getting back to normal breathing  Subjective:   Feels well  Less sob  Weaned off of oxygen  Anxious to go home  Objective:     Vitals:   Temp (24hrs), Av °F (36 7 °C), Min:97 °F (36 1 °C), Max:99 2 °F (37 3 °C)    Temp:  [97 °F (36 1 °C)-99 2 °F (37 3 °C)] 98 2 °F (36 8 °C)  HR:  [60-67] 62  Resp:  [18] 18  BP: (150-167)/(62-74) 158/72  SpO2:  [92 %-97 %] 95 %  Body mass index is 29 36 kg/m²  Input and Output Summary (last 24 hours): Intake/Output Summary (Last 24 hours) at 2019 1700  Last data filed at 2019 1523  Gross per 24 hour   Intake 120 ml   Output 1050 ml   Net -930 ml       Physical Exam:     Physical Exam   HENT:   Head: Normocephalic and atraumatic  Eyes: Pupils are equal, round, and reactive to light  EOM are normal    Cardiovascular: Normal rate and regular rhythm  Exam reveals no gallop and no friction rub  No murmur heard  Pulmonary/Chest: Effort normal and breath sounds normal  He has no wheezes  He has no rales  Abdominal: Soft  Bowel sounds are normal  There is no tenderness  Musculoskeletal: He exhibits no edema  Nursing note and vitals reviewed              Additional Data:     Labs:    Results from last 7 days   Lab Units 19  0505   WBC Thousand/uL 9 57   HEMOGLOBIN g/dL 9 9*   HEMATOCRIT % 29 9*   PLATELETS Thousands/uL 274   NEUTROS PCT % 69   LYMPHS PCT % 13*   MONOS PCT % 7   EOS PCT % 10*     Results from last 7 days   Lab Units 05/21/19  0505  05/15/19  1314   POTASSIUM mmol/L 4 0   < > 5 3   CHLORIDE mmol/L 105   < > 101   CO2 mmol/L 24   < > 17*   BUN mg/dL 33*   < > 51*   CREATININE mg/dL 1 32*   < > 1 56*   CALCIUM mg/dL 9 3   < > 9 8   ALK PHOS U/L  --   --  109   ALT U/L  --   --  29   AST U/L  --   --  26    < > = values in this interval not displayed  Results from last 7 days   Lab Units 05/15/19  1314   INR  1 12     Results from last 7 days   Lab Units 05/21/19  1527 05/21/19  1147 05/21/19  0716 05/20/19  1617 05/20/19  1152 05/20/19  0721 05/19/19  2033 05/19/19  1525 05/19/19  1138 05/19/19  0710 05/18/19  1547 05/18/19  1116   POC GLUCOSE mg/dl 158* 210* 147* 185* 208* 127 125 149* 206* 130 175* 265*               * I Have Reviewed All Lab Data     Recent Cultures (last 7 days):     Results from last 7 days   Lab Units 05/15/19  1317 05/15/19  1314   BLOOD CULTURE  No Growth After 5 Days  No Growth After 5 Days           Last 24 Hours Medication List:     Current Facility-Administered Medications:  acetaminophen 650 mg Oral Q6H PRN Laura Booker PA-C   allopurinol 300 mg Oral QAM Tomy Bird MD   amLODIPine 5 mg Oral Daily Soraya Price MD   apixaban 5 mg Oral BID Bean Hinds DO   betamethasone valerate 1 application Topical Daily Tomy Bird MD   furosemide 80 mg Oral BID (diuretic) Soraya Price MD   insulin lispro 1-5 Units Subcutaneous TID [facility] Tomy Bird MD   lidocaine 1 patch Topical Daily Lam Meza PA-C   metoprolol tartrate 50 mg Oral Q12H [facility] Tomy Bird MD   pravastatin 20 mg Oral Daily With Amanda Jett MD   sitaGLIPtin 25 mg Oral Daily Tomy Bird MD   tamsulosin 0 4 mg Oral Daily With Amanda Jett MD         VTE Pharmacologic Prophylaxis:   Pharmacologic: Apixaban (Eliquis)      Current Length of Stay: 6 day(s)    Current Patient Status: Inpatient       Discharge Plan: home possibly tomorrow    Code Status: Level 1 - Full Code           Today, Patient Was Seen By: Linard Bernheim, DO    ** Please Note: Dictation voice to text software may have been used in the creation of this document   **

## 2019-05-21 NOTE — PLAN OF CARE
Problem: Potential for Falls  Goal: Patient will remain free of falls  Description  INTERVENTIONS:  - Assess patient frequently for physical needs  -  Identify cognitive and physical deficits and behaviors that affect risk of falls    -  [place] fall precautions as indicated by assessment   - Educate patient/family on patient safety including physical limitations  - Instruct patient to call for assistance with activity based on assessment  - Modify environment to reduce risk of injury  - Consider OT/PT consult to assist with strengthening/mobility  Outcome: Progressing     Problem: PAIN - ADULT  Goal: Verbalizes/displays adequate comfort level or baseline comfort level  Description  Interventions:  - Encourage patient to monitor pain and request assistance  - Assess pain using appropriate pain scale  - Administer analgesics based on type and severity of pain and evaluate response  - Implement non-pharmacological measures as appropriate and evaluate response  - Consider cultural and social influences on pain and pain management  - Notify physician/advanced practitioner if interventions unsuccessful or patient reports new pain  Outcome: Progressing     Problem: DISCHARGE PLANNING  Goal: Discharge to home or other facility with appropriate resources  Description  INTERVENTIONS:  - Identify barriers to discharge w/patient and caregiver  - Arrange for needed discharge resources and transportation as appropriate  - Identify discharge learning needs (meds, wound care, etc )  - Arrange for interpretive services to assist at discharge as needed  - Refer to Case Management Department for coordinating discharge planning if the patient needs post-hospital services based on physician/advanced practitioner order or complex needs related to functional status, cognitive ability, or social support system  Outcome: Progressing     Problem: CARDIOVASCULAR - ADULT  Goal: Maintains optimal cardiac output and hemodynamic stability  Description  INTERVENTIONS:  - Monitor I/O, vital signs and rhythm  - Monitor for S/S and trends of decreased cardiac output i e  bleeding, hypotension  - Administer and titrate ordered vasoactive medications to optimize hemodynamic stability  - Assess quality of pulses, skin color and temperature  - Assess for signs of decreased coronary artery perfusion - ex   Angina  - Instruct patient to report change in severity of symptoms  Outcome: Progressing     Problem: RESPIRATORY - ADULT  Goal: Achieves optimal ventilation and oxygenation  Description  INTERVENTIONS:  - Assess for changes in respiratory status  - Assess for changes in mentation and behavior  - Position to facilitate oxygenation and minimize respiratory effort  - Oxygen administration by appropriate delivery method based on oxygen saturation (per order) or ABGs  - Initiate smoking cessation education as indicated  - Encourage broncho-pulmonary hygiene including cough, deep breathe, Incentive Spirometry  - Assess the need for suctioning and aspirate as needed  - Assess and instruct to report SOB or any respiratory difficulty  - Respiratory Therapy support as indicated  Outcome: Progressing     Problem: INFECTION - ADULT  Goal: Absence or prevention of progression during hospitalization  Description  INTERVENTIONS:  - Assess and monitor for signs and symptoms of infection  - Monitor lab/diagnostic results  - Monitor all insertion sites, i e  indwelling lines, tubes, and drains  - Monitor endotracheal (as able) and nasal secretions for changes in amount and color  - [place] appropriate cooling/warming therapies per order  - Administer medications as ordered  - Instruct and encourage patient and family to use good hand hygiene technique  - Identify and instruct in appropriate isolation precautions for identified infection/condition  Outcome: Progressing  Goal: Absence of fever/infection during neutropenic period  Description  INTERVENTIONS:  - Monitor WBC  - Implement neutropenic guidelines  Outcome: Progressing     Problem: SAFETY ADULT  Goal: Patient will remain free of falls  Description  INTERVENTIONS:  - Assess patient frequently for physical needs  -  Identify cognitive and physical deficits and behaviors that affect risk of falls    -  [place] fall precautions as indicated by assessment   - Educate patient/family on patient safety including physical limitations  - Instruct patient to call for assistance with activity based on assessment  - Modify environment to reduce risk of injury  - Consider OT/PT consult to assist with strengthening/mobility  Outcome: Progressing  Goal: Maintain or return to baseline ADL function  Description  INTERVENTIONS:  -  Assess patient's ability to carry out ADLs; assess patient's baseline for ADL function and identify physical deficits which impact ability to perform ADLs (bathing, care of mouth/teeth, toileting, grooming, dressing, etc )  - Assess/evaluate cause of self-care deficits   - Assess range of motion  - Assess patient's mobility; develop plan if impaired  - Assess patient's need for assistive devices and provide as appropriate  - Encourage maximum independence but intervene and supervise when necessary  ¯ Involve family in performance of ADLs  ¯ Assess for home care needs following discharge   ¯ Request OT consult to assist with ADL evaluation and planning for discharge  ¯ Provide patient education as appropriate  Outcome: Progressing  Goal: Maintain or return mobility status to optimal level  Description  INTERVENTIONS:  - Assess patient's baseline mobility status (ambulation, transfers, stairs, etc )    - Identify cognitive and physical deficits and behaviors that affect mobility  - Identify mobility aids required to assist with transfers and/or ambulation (gait belt, sit-to-stand, lift, walker, cane, etc )  - [place] fall precautions as indicated by assessment  - Record patient progress and toleration of activity level on Mobility SBAR; progress patient to next Phase/Stage  - Instruct patient to call for assistance with activity based on assessment  - Request Rehabilitation consult to assist with strengthening/weightbearing, etc   Outcome: Progressing     Problem: Knowledge Deficit  Goal: Patient/family/caregiver demonstrates understanding of disease process, treatment plan, medications, and discharge instructions  Description  Complete learning assessment and assess knowledge base    Interventions:  - Provide teaching at level of understanding  - Provide teaching via preferred learning methods  Outcome: Progressing     Problem: METABOLIC, FLUID AND ELECTROLYTES - ADULT  Goal: Electrolytes maintained within normal limits  Description  INTERVENTIONS:  - Monitor labs and assess patient for signs and symptoms of electrolyte imbalances  - Administer electrolyte replacement as ordered  - Monitor response to electrolyte replacements, including repeat lab results as appropriate  - Instruct patient on fluid and nutrition as appropriate  Outcome: Progressing  Goal: Fluid balance maintained  Description  INTERVENTIONS:  - Monitor labs and assess for signs and symptoms of volume excess or deficit  - Monitor I/O and WT  - Instruct patient on fluid and nutrition as appropriate  Outcome: Progressing  Goal: Glucose maintained within target range  Description  INTERVENTIONS:  - Monitor Blood Glucose as ordered  - Assess for signs and symptoms of hyperglycemia and hypoglycemia  - Administer ordered medications to maintain glucose within target range  - Assess nutritional intake and initiate nutrition service referral as needed  Outcome: Progressing     Problem: DISCHARGE PLANNING - CARE MANAGEMENT  Goal: Discharge to post-acute care or home with appropriate resources  Description  INTERVENTIONS:  - Conduct assessment to determine patient/family and health care team treatment goals, and need for post-acute services based on payer coverage, community resources, and patient preferences, and barriers to discharge  - Address psychosocial, clinical, and financial barriers to discharge as identified in assessment in conjunction with the patient/family and health care team  - Arrange appropriate level of post-acute services according to patients   needs and preference and payer coverage in collaboration with the physician and health care team  - Communicate with and update the patient/family, physician, and health care team regarding progress on the discharge plan  - Arrange appropriate transportation to post-acute venues  Outcome: Progressing

## 2019-05-21 NOTE — PLAN OF CARE
Problem: Potential for Falls  Goal: Patient will remain free of falls  Description  INTERVENTIONS:  - Assess patient frequently for physical needs  -  Identify cognitive and physical deficits and behaviors that affect risk of falls    -  [place] fall precautions as indicated by assessment   - Educate patient/family on patient safety including physical limitations  - Instruct patient to call for assistance with activity based on assessment  - Modify environment to reduce risk of injury  - Consider OT/PT consult to assist with strengthening/mobility  Outcome: Progressing     Problem: PAIN - ADULT  Goal: Verbalizes/displays adequate comfort level or baseline comfort level  Description  Interventions:  - Encourage patient to monitor pain and request assistance  - Assess pain using appropriate pain scale  - Administer analgesics based on type and severity of pain and evaluate response  - Implement non-pharmacological measures as appropriate and evaluate response  - Consider cultural and social influences on pain and pain management  - Notify physician/advanced practitioner if interventions unsuccessful or patient reports new pain  Outcome: Progressing     Problem: DISCHARGE PLANNING  Goal: Discharge to home or other facility with appropriate resources  Description  INTERVENTIONS:  - Identify barriers to discharge w/patient and caregiver  - Arrange for needed discharge resources and transportation as appropriate  - Identify discharge learning needs (meds, wound care, etc )  - Arrange for interpretive services to assist at discharge as needed  - Refer to Case Management Department for coordinating discharge planning if the patient needs post-hospital services based on physician/advanced practitioner order or complex needs related to functional status, cognitive ability, or social support system  Outcome: Progressing     Problem: CARDIOVASCULAR - ADULT  Goal: Maintains optimal cardiac output and hemodynamic stability  Description  INTERVENTIONS:  - Monitor I/O, vital signs and rhythm  - Monitor for S/S and trends of decreased cardiac output i e  bleeding, hypotension  - Administer and titrate ordered vasoactive medications to optimize hemodynamic stability  - Assess quality of pulses, skin color and temperature  - Assess for signs of decreased coronary artery perfusion - ex   Angina  - Instruct patient to report change in severity of symptoms  Outcome: Progressing     Problem: RESPIRATORY - ADULT  Goal: Achieves optimal ventilation and oxygenation  Description  INTERVENTIONS:  - Assess for changes in respiratory status  - Assess for changes in mentation and behavior  - Position to facilitate oxygenation and minimize respiratory effort  - Oxygen administration by appropriate delivery method based on oxygen saturation (per order) or ABGs  - Initiate smoking cessation education as indicated  - Encourage broncho-pulmonary hygiene including cough, deep breathe, Incentive Spirometry  - Assess the need for suctioning and aspirate as needed  - Assess and instruct to report SOB or any respiratory difficulty  - Respiratory Therapy support as indicated  Outcome: Progressing     Problem: INFECTION - ADULT  Goal: Absence or prevention of progression during hospitalization  Description  INTERVENTIONS:  - Assess and monitor for signs and symptoms of infection  - Monitor lab/diagnostic results  - Monitor all insertion sites, i e  indwelling lines, tubes, and drains  - Monitor endotracheal (as able) and nasal secretions for changes in amount and color  - [place] appropriate cooling/warming therapies per order  - Administer medications as ordered  - Instruct and encourage patient and family to use good hand hygiene technique  - Identify and instruct in appropriate isolation precautions for identified infection/condition  Outcome: Progressing  Goal: Absence of fever/infection during neutropenic period  Description  INTERVENTIONS:  - Monitor WBC  - Implement neutropenic guidelines  Outcome: Progressing     Problem: SAFETY ADULT  Goal: Patient will remain free of falls  Description  INTERVENTIONS:  - Assess patient frequently for physical needs  -  Identify cognitive and physical deficits and behaviors that affect risk of falls    -  [place] fall precautions as indicated by assessment   - Educate patient/family on patient safety including physical limitations  - Instruct patient to call for assistance with activity based on assessment  - Modify environment to reduce risk of injury  - Consider OT/PT consult to assist with strengthening/mobility  Outcome: Progressing  Goal: Maintain or return to baseline ADL function  Description  INTERVENTIONS:  -  Assess patient's ability to carry out ADLs; assess patient's baseline for ADL function and identify physical deficits which impact ability to perform ADLs (bathing, care of mouth/teeth, toileting, grooming, dressing, etc )  - Assess/evaluate cause of self-care deficits   - Assess range of motion  - Assess patient's mobility; develop plan if impaired  - Assess patient's need for assistive devices and provide as appropriate  - Encourage maximum independence but intervene and supervise when necessary  ¯ Involve family in performance of ADLs  ¯ Assess for home care needs following discharge   ¯ Request OT consult to assist with ADL evaluation and planning for discharge  ¯ Provide patient education as appropriate  Outcome: Progressing  Goal: Maintain or return mobility status to optimal level  Description  INTERVENTIONS:  - Assess patient's baseline mobility status (ambulation, transfers, stairs, etc )    - Identify cognitive and physical deficits and behaviors that affect mobility  - Identify mobility aids required to assist with transfers and/or ambulation (gait belt, sit-to-stand, lift, walker, cane, etc )  - [place] fall precautions as indicated by assessment  - Record patient progress and toleration of activity level on Mobility SBAR; progress patient to next Phase/Stage  - Instruct patient to call for assistance with activity based on assessment  - Request Rehabilitation consult to assist with strengthening/weightbearing, etc   Outcome: Progressing     Problem: Knowledge Deficit  Goal: Patient/family/caregiver demonstrates understanding of disease process, treatment plan, medications, and discharge instructions  Description  Complete learning assessment and assess knowledge base    Interventions:  - Provide teaching at level of understanding  - Provide teaching via preferred learning methods  Outcome: Progressing     Problem: METABOLIC, FLUID AND ELECTROLYTES - ADULT  Goal: Electrolytes maintained within normal limits  Description  INTERVENTIONS:  - Monitor labs and assess patient for signs and symptoms of electrolyte imbalances  - Administer electrolyte replacement as ordered  - Monitor response to electrolyte replacements, including repeat lab results as appropriate  - Instruct patient on fluid and nutrition as appropriate  Outcome: Progressing  Goal: Fluid balance maintained  Description  INTERVENTIONS:  - Monitor labs and assess for signs and symptoms of volume excess or deficit  - Monitor I/O and WT  - Instruct patient on fluid and nutrition as appropriate  Outcome: Progressing  Goal: Glucose maintained within target range  Description  INTERVENTIONS:  - Monitor Blood Glucose as ordered  - Assess for signs and symptoms of hyperglycemia and hypoglycemia  - Administer ordered medications to maintain glucose within target range  - Assess nutritional intake and initiate nutrition service referral as needed  Outcome: Progressing     Problem: DISCHARGE PLANNING - CARE MANAGEMENT  Goal: Discharge to post-acute care or home with appropriate resources  Description  INTERVENTIONS:  - Conduct assessment to determine patient/family and health care team treatment goals, and need for post-acute services based on payer coverage, community resources, and patient preferences, and barriers to discharge  - Address psychosocial, clinical, and financial barriers to discharge as identified in assessment in conjunction with the patient/family and health care team  - Arrange appropriate level of post-acute services according to patients   needs and preference and payer coverage in collaboration with the physician and health care team  - Communicate with and update the patient/family, physician, and health care team regarding progress on the discharge plan  - Arrange appropriate transportation to post-acute venues  Outcome: Progressing

## 2019-05-22 ENCOUNTER — APPOINTMENT (INPATIENT)
Dept: NON INVASIVE DIAGNOSTICS | Facility: HOSPITAL | Age: 64
DRG: 286 | End: 2019-05-22
Payer: MEDICARE

## 2019-05-22 LAB
ANION GAP SERPL CALCULATED.3IONS-SCNC: 10 MMOL/L (ref 4–13)
BASOPHILS # BLD AUTO: 0.08 THOUSANDS/ΜL (ref 0–0.1)
BASOPHILS NFR BLD AUTO: 1 % (ref 0–1)
BUN SERPL-MCNC: 34 MG/DL (ref 5–25)
CALCIUM SERPL-MCNC: 9.6 MG/DL (ref 8.3–10.1)
CHLORIDE SERPL-SCNC: 105 MMOL/L (ref 100–108)
CO2 SERPL-SCNC: 24 MMOL/L (ref 21–32)
CREAT SERPL-MCNC: 1.46 MG/DL (ref 0.6–1.3)
EOSINOPHIL # BLD AUTO: 0.93 THOUSAND/ΜL (ref 0–0.61)
EOSINOPHIL NFR BLD AUTO: 11 % (ref 0–6)
ERYTHROCYTE [DISTWIDTH] IN BLOOD BY AUTOMATED COUNT: 15 % (ref 11.6–15.1)
GFR SERPL CREATININE-BSD FRML MDRD: 50 ML/MIN/1.73SQ M
GLUCOSE SERPL-MCNC: 112 MG/DL (ref 65–140)
GLUCOSE SERPL-MCNC: 121 MG/DL (ref 65–140)
GLUCOSE SERPL-MCNC: 127 MG/DL (ref 65–140)
GLUCOSE SERPL-MCNC: 137 MG/DL (ref 65–140)
GLUCOSE SERPL-MCNC: 201 MG/DL (ref 65–140)
HCT VFR BLD AUTO: 31.7 % (ref 36.5–49.3)
HGB BLD-MCNC: 10.3 G/DL (ref 12–17)
IMM GRANULOCYTES # BLD AUTO: 0.03 THOUSAND/UL (ref 0–0.2)
IMM GRANULOCYTES NFR BLD AUTO: 0 % (ref 0–2)
LYMPHOCYTES # BLD AUTO: 1.29 THOUSANDS/ΜL (ref 0.6–4.47)
LYMPHOCYTES NFR BLD AUTO: 15 % (ref 14–44)
MAGNESIUM SERPL-MCNC: 1.9 MG/DL (ref 1.6–2.6)
MCH RBC QN AUTO: 29.3 PG (ref 26.8–34.3)
MCHC RBC AUTO-ENTMCNC: 32.5 G/DL (ref 31.4–37.4)
MCV RBC AUTO: 90 FL (ref 82–98)
MONOCYTES # BLD AUTO: 0.53 THOUSAND/ΜL (ref 0.17–1.22)
MONOCYTES NFR BLD AUTO: 6 % (ref 4–12)
NEUTROPHILS # BLD AUTO: 5.93 THOUSANDS/ΜL (ref 1.85–7.62)
NEUTS SEG NFR BLD AUTO: 67 % (ref 43–75)
NRBC BLD AUTO-RTO: 0 /100 WBCS
PLATELET # BLD AUTO: 284 THOUSANDS/UL (ref 149–390)
PMV BLD AUTO: 9.3 FL (ref 8.9–12.7)
POTASSIUM SERPL-SCNC: 3.9 MMOL/L (ref 3.5–5.3)
RBC # BLD AUTO: 3.52 MILLION/UL (ref 3.88–5.62)
SODIUM SERPL-SCNC: 139 MMOL/L (ref 136–145)
WBC # BLD AUTO: 8.79 THOUSAND/UL (ref 4.31–10.16)

## 2019-05-22 PROCEDURE — 99232 SBSQ HOSP IP/OBS MODERATE 35: CPT | Performed by: INTERNAL MEDICINE

## 2019-05-22 PROCEDURE — 80048 BASIC METABOLIC PNL TOTAL CA: CPT | Performed by: HOSPITALIST

## 2019-05-22 PROCEDURE — 94660 CPAP INITIATION&MGMT: CPT

## 2019-05-22 PROCEDURE — 93312 ECHO TRANSESOPHAGEAL: CPT

## 2019-05-22 PROCEDURE — 94760 N-INVAS EAR/PLS OXIMETRY 1: CPT

## 2019-05-22 PROCEDURE — 93312 ECHO TRANSESOPHAGEAL: CPT | Performed by: INTERNAL MEDICINE

## 2019-05-22 PROCEDURE — 93325 DOPPLER ECHO COLOR FLOW MAPG: CPT | Performed by: INTERNAL MEDICINE

## 2019-05-22 PROCEDURE — 85025 COMPLETE CBC W/AUTO DIFF WBC: CPT | Performed by: HOSPITALIST

## 2019-05-22 PROCEDURE — 99232 SBSQ HOSP IP/OBS MODERATE 35: CPT | Performed by: HOSPITALIST

## 2019-05-22 PROCEDURE — 93320 DOPPLER ECHO COMPLETE: CPT | Performed by: INTERNAL MEDICINE

## 2019-05-22 PROCEDURE — 82948 REAGENT STRIP/BLOOD GLUCOSE: CPT

## 2019-05-22 PROCEDURE — 83735 ASSAY OF MAGNESIUM: CPT | Performed by: HOSPITALIST

## 2019-05-22 RX ORDER — ONDANSETRON 2 MG/ML
4 INJECTION INTRAMUSCULAR; INTRAVENOUS EVERY 6 HOURS PRN
Status: DISCONTINUED | OUTPATIENT
Start: 2019-05-22 | End: 2019-05-22 | Stop reason: HOSPADM

## 2019-05-22 RX ORDER — PROPOFOL 10 MG/ML
INJECTION, EMULSION INTRAVENOUS AS NEEDED
Status: DISCONTINUED | OUTPATIENT
Start: 2019-05-22 | End: 2019-05-22 | Stop reason: SURG

## 2019-05-22 RX ORDER — SODIUM CHLORIDE 9 MG/ML
INJECTION, SOLUTION INTRAVENOUS CONTINUOUS PRN
Status: DISCONTINUED | OUTPATIENT
Start: 2019-05-22 | End: 2019-05-22 | Stop reason: SURG

## 2019-05-22 RX ADMIN — TAMSULOSIN HYDROCHLORIDE 0.4 MG: 0.4 CAPSULE ORAL at 17:32

## 2019-05-22 RX ADMIN — ALLOPURINOL 300 MG: 100 TABLET ORAL at 09:03

## 2019-05-22 RX ADMIN — PROPOFOL 50 MG: 10 INJECTION, EMULSION INTRAVENOUS at 11:03

## 2019-05-22 RX ADMIN — METOPROLOL TARTRATE 50 MG: 50 TABLET, FILM COATED ORAL at 09:04

## 2019-05-22 RX ADMIN — PROPOFOL 50 MG: 10 INJECTION, EMULSION INTRAVENOUS at 11:15

## 2019-05-22 RX ADMIN — METOPROLOL TARTRATE 50 MG: 50 TABLET, FILM COATED ORAL at 20:41

## 2019-05-22 RX ADMIN — APIXABAN 5 MG: 5 TABLET, FILM COATED ORAL at 11:56

## 2019-05-22 RX ADMIN — PROPOFOL 50 MG: 10 INJECTION, EMULSION INTRAVENOUS at 11:08

## 2019-05-22 RX ADMIN — FUROSEMIDE 80 MG: 80 TABLET ORAL at 09:04

## 2019-05-22 RX ADMIN — SITAGLIPTIN 25 MG: 50 TABLET, FILM COATED ORAL at 09:04

## 2019-05-22 RX ADMIN — APIXABAN 5 MG: 5 TABLET, FILM COATED ORAL at 17:32

## 2019-05-22 RX ADMIN — PROPOFOL 50 MG: 10 INJECTION, EMULSION INTRAVENOUS at 11:05

## 2019-05-22 RX ADMIN — SODIUM CHLORIDE: 9 INJECTION, SOLUTION INTRAVENOUS at 10:58

## 2019-05-22 RX ADMIN — PROPOFOL 50 MG: 10 INJECTION, EMULSION INTRAVENOUS at 11:11

## 2019-05-22 RX ADMIN — PRAVASTATIN SODIUM 20 MG: 20 TABLET ORAL at 17:32

## 2019-05-22 RX ADMIN — AMLODIPINE BESYLATE 5 MG: 5 TABLET ORAL at 09:03

## 2019-05-22 RX ADMIN — BETAMETHASONE VALERATE 1 APPLICATION: 1 CREAM TOPICAL at 09:07

## 2019-05-22 RX ADMIN — FUROSEMIDE 80 MG: 80 TABLET ORAL at 17:32

## 2019-05-22 NOTE — PROGRESS NOTES
Progress Note - Cardiology   Davida Mendez 70 y o  male MRN: [id number]  Unit/Bed#: E4 -01 Encounter: [id number]      Assessment:     1  Acute on diastolic heart failure with probably moderate to severe mitral stenosis and very mild aortic stenosis  2  Paroxysmal atrial fibrillation in the past during acute illness  3  Complete heart block status post pacemaker  4  Hypertension  5  Dyslipidemia  6  COPD   7  PAD with diffuse atherosclerosis throughout femoral popliteal arteries bilaterally  8  Acute on chronic renal insufficiency  9  Brief nonsustained ventricular tachycardia    Discussion/Recommendations:    Continue IV diuresis throughout the day  Beta-blocker/calcium channel blocker/Eliquis/statin      Subjective:  Short of breath but improving      Physical Exam:  GEN:  NAD  HEENT:  MMM, NCAT, pink conjunctiva, EOMI, nonicteric sclera  CV:  NO JVD/HJR, RR, NO M/R/G, +S1/S2, NO PARASTERNAL HEAVE/THRILL, trace to 1 + LE EDEMA, NO HEPATIC SYSTOLIC PULSATION, WARM EXTREMITIES  RESP:  CTAB/L  ABD:  SOFT, NT, NO GROSS ORGANOMEGALY        Vitals:   /62   Pulse 61   Temp 99 °F (37 2 °C) (Temporal)   Resp (!) 24   Wt 92 8 kg (204 lb 9 4 oz)   SpO2 98%   BMI 29 36 kg/m²   Vitals:    05/21/19 0600 05/22/19 0536   Weight: 92 8 kg (204 lb 9 4 oz) 92 8 kg (204 lb 9 4 oz)       Intake/Output Summary (Last 24 hours) at 5/22/2019 1123  Last data filed at 5/22/2019 1119  Gross per 24 hour   Intake 460 ml   Output 700 ml   Net -240 ml         Lab Results:  Results from last 7 days   Lab Units 05/22/19  0511   WBC Thousand/uL 8 79   HEMOGLOBIN g/dL 10 3*   HEMATOCRIT % 31 7*   PLATELETS Thousands/uL 284     Results from last 7 days   Lab Units 05/22/19  0511  05/15/19  1314   POTASSIUM mmol/L 3 9   < > 5 3   CHLORIDE mmol/L 105   < > 101   CO2 mmol/L 24   < > 17*   BUN mg/dL 34*   < > 51*   CREATININE mg/dL 1 46*   < > 1 56*   CALCIUM mg/dL 9 6   < > 9 8   ALK PHOS U/L  --   --  109   ALT U/L  --   --  29   AST U/L  --   --  26    < > = values in this interval not displayed       Results from last 7 days   Lab Units 05/22/19  0511   POTASSIUM mmol/L 3 9   CHLORIDE mmol/L 105   CO2 mmol/L 24   BUN mg/dL 34*   CREATININE mg/dL 1 46*   CALCIUM mg/dL 9 6             Medications:    Current Facility-Administered Medications:     acetaminophen (TYLENOL) tablet 650 mg, 650 mg, Oral, Q6H PRN, Deloris Meza PA-C, 650 mg at 05/16/19 1957    allopurinol (ZYLOPRIM) tablet 300 mg, 300 mg, Oral, QAM, Jamaal Goff MD, 300 mg at 05/22/19 0903    amLODIPine (NORVASC) tablet 5 mg, 5 mg, Oral, Daily, Mar Boss MD, 5 mg at 05/22/19 9656    apixaban (ELIQUIS) tablet 5 mg, 5 mg, Oral, BID, Bean Hinds DO, Stopped at 05/22/19 0904    betamethasone valerate (VALISONE) 0 1 % cream 1 application, 1 application, Topical, Daily, Jamaal Goff MD, 1 application at 55/24/24 0907    furosemide (LASIX) tablet 80 mg, 80 mg, Oral, BID (diuretic), Mar Boss MD, 80 mg at 05/22/19 0904    insulin lispro (HumaLOG) 100 units/mL subcutaneous injection 1-5 Units, 1-5 Units, Subcutaneous, TID AC, 1 Units at 05/21/19 1709 **AND** Fingerstick Glucose (POCT), , , TID AC, Jamaal Goff MD    lidocaine (LIDODERM) 5 % patch 1 patch, 1 patch, Topical, Daily, Asuncion Treviño PA-C, Stopped at 05/21/19 6620    metoprolol tartrate (LOPRESSOR) tablet 50 mg, 50 mg, Oral, Q12H [address], Jamaal Goff MD, 50 mg at 05/22/19 9129    pravastatin (PRAVACHOL) tablet 20 mg, 20 mg, Oral, Daily With Nyla Melendez MD, 20 mg at 05/21/19 1709    sitaGLIPtin (JANUVIA) tablet 25 mg, 25 mg, Oral, Daily, Jamaal Goff MD, 25 mg at 05/22/19 0904    tamsulosin (FLOMAX) capsule 0 4 mg, 0 4 mg, Oral, Daily With Nyla Melendez MD, 0 4 mg at 05/21/19 1704    Facility-Administered Medications Ordered in Other Encounters:     propofol (DIPRIVAN) 200 MG/20ML bolus injection, , Intravenous, PRN, Lopez Tovar MD, 50 mg at 05/22/19 1115    sodium chloride 0 9 % infusion, , , Continuous PRN, Liv Estrada MD, Stopped at 05/22/19 1119    Portions of the record may have been created with voice recognition software  Occasional wrong word or "sound a like" substitutions may have occurred due to the inherent limitations of voice recognition software  Read the chart carefully and recognize, using context, where substitutions have occurred

## 2019-05-22 NOTE — PLAN OF CARE
Problem: Potential for Falls  Goal: Patient will remain free of falls  Description  INTERVENTIONS:  - Assess patient frequently for physical needs  -  Identify cognitive and physical deficits and behaviors that affect risk of falls    -  [place] fall precautions as indicated by assessment   - Educate patient/family on patient safety including physical limitations  - Instruct patient to call for assistance with activity based on assessment  - Modify environment to reduce risk of injury  - Consider OT/PT consult to assist with strengthening/mobility  Outcome: Progressing     Problem: PAIN - ADULT  Goal: Verbalizes/displays adequate comfort level or baseline comfort level  Description  Interventions:  - Encourage patient to monitor pain and request assistance  - Assess pain using appropriate pain scale  - Administer analgesics based on type and severity of pain and evaluate response  - Implement non-pharmacological measures as appropriate and evaluate response  - Consider cultural and social influences on pain and pain management  - Notify physician/advanced practitioner if interventions unsuccessful or patient reports new pain  Outcome: Progressing     Problem: DISCHARGE PLANNING  Goal: Discharge to home or other facility with appropriate resources  Description  INTERVENTIONS:  - Identify barriers to discharge w/patient and caregiver  - Arrange for needed discharge resources and transportation as appropriate  - Identify discharge learning needs (meds, wound care, etc )  - Arrange for interpretive services to assist at discharge as needed  - Refer to Case Management Department for coordinating discharge planning if the patient needs post-hospital services based on physician/advanced practitioner order or complex needs related to functional status, cognitive ability, or social support system  Outcome: Progressing     Problem: CARDIOVASCULAR - ADULT  Goal: Maintains optimal cardiac output and hemodynamic stability  Description  INTERVENTIONS:  - Monitor I/O, vital signs and rhythm  - Monitor for S/S and trends of decreased cardiac output i e  bleeding, hypotension  - Administer and titrate ordered vasoactive medications to optimize hemodynamic stability  - Assess quality of pulses, skin color and temperature  - Assess for signs of decreased coronary artery perfusion - ex   Angina  - Instruct patient to report change in severity of symptoms  Outcome: Progressing     Problem: RESPIRATORY - ADULT  Goal: Achieves optimal ventilation and oxygenation  Description  INTERVENTIONS:  - Assess for changes in respiratory status  - Assess for changes in mentation and behavior  - Position to facilitate oxygenation and minimize respiratory effort  - Oxygen administration by appropriate delivery method based on oxygen saturation (per order) or ABGs  - Initiate smoking cessation education as indicated  - Encourage broncho-pulmonary hygiene including cough, deep breathe, Incentive Spirometry  - Assess the need for suctioning and aspirate as needed  - Assess and instruct to report SOB or any respiratory difficulty  - Respiratory Therapy support as indicated  Outcome: Progressing     Problem: INFECTION - ADULT  Goal: Absence or prevention of progression during hospitalization  Description  INTERVENTIONS:  - Assess and monitor for signs and symptoms of infection  - Monitor lab/diagnostic results  - Monitor all insertion sites, i e  indwelling lines, tubes, and drains  - Monitor endotracheal (as able) and nasal secretions for changes in amount and color  - Ida appropriate cooling/warming therapies per order  - Administer medications as ordered  - Instruct and encourage patient and family to use good hand hygiene technique  - Identify and instruct in appropriate isolation precautions for identified infection/condition  Outcome: Progressing  Goal: Absence of fever/infection during neutropenic period  Description  INTERVENTIONS:  - Monitor WBC  - Implement neutropenic guidelines  Outcome: Progressing     Problem: SAFETY ADULT  Goal: Patient will remain free of falls  Description  INTERVENTIONS:  - Assess patient frequently for physical needs  -  Identify cognitive and physical deficits and behaviors that affect risk of falls    -  [place] fall precautions as indicated by assessment   - Educate patient/family on patient safety including physical limitations  - Instruct patient to call for assistance with activity based on assessment  - Modify environment to reduce risk of injury  - Consider OT/PT consult to assist with strengthening/mobility  Outcome: Progressing  Goal: Maintain or return to baseline ADL function  Description  INTERVENTIONS:  -  Assess patient's ability to carry out ADLs; assess patient's baseline for ADL function and identify physical deficits which impact ability to perform ADLs (bathing, care of mouth/teeth, toileting, grooming, dressing, etc )  - Assess/evaluate cause of self-care deficits   - Assess range of motion  - Assess patient's mobility; develop plan if impaired  - Assess patient's need for assistive devices and provide as appropriate  - Encourage maximum independence but intervene and supervise when necessary  ¯ Involve family in performance of ADLs  ¯ Assess for home care needs following discharge   ¯ Request OT consult to assist with ADL evaluation and planning for discharge  ¯ Provide patient education as appropriate  Outcome: Progressing  Goal: Maintain or return mobility status to optimal level  Description  INTERVENTIONS:  - Assess patient's baseline mobility status (ambulation, transfers, stairs, etc )    - Identify cognitive and physical deficits and behaviors that affect mobility  - Identify mobility aids required to assist with transfers and/or ambulation (gait belt, sit-to-stand, lift, walker, cane, etc )  - [place] fall precautions as indicated by assessment  - Record patient progress and toleration of activity level on Mobility SBAR; progress patient to next Phase/Stage  - Instruct patient to call for assistance with activity based on assessment  - Request Rehabilitation consult to assist with strengthening/weightbearing, etc   Outcome: Progressing     Problem: Knowledge Deficit  Goal: Patient/family/caregiver demonstrates understanding of disease process, treatment plan, medications, and discharge instructions  Description  Complete learning assessment and assess knowledge base    Interventions:  - Provide teaching at level of understanding  - Provide teaching via preferred learning methods  Outcome: Progressing     Problem: METABOLIC, FLUID AND ELECTROLYTES - ADULT  Goal: Electrolytes maintained within normal limits  Description  INTERVENTIONS:  - Monitor labs and assess patient for signs and symptoms of electrolyte imbalances  - Administer electrolyte replacement as ordered  - Monitor response to electrolyte replacements, including repeat lab results as appropriate  - Instruct patient on fluid and nutrition as appropriate  Outcome: Progressing  Goal: Fluid balance maintained  Description  INTERVENTIONS:  - Monitor labs and assess for signs and symptoms of volume excess or deficit  - Monitor I/O and WT  - Instruct patient on fluid and nutrition as appropriate  Outcome: Progressing  Goal: Glucose maintained within target range  Description  INTERVENTIONS:  - Monitor Blood Glucose as ordered  - Assess for signs and symptoms of hyperglycemia and hypoglycemia  - Administer ordered medications to maintain glucose within target range  - Assess nutritional intake and initiate nutrition service referral as needed  Outcome: Progressing     Problem: DISCHARGE PLANNING - CARE MANAGEMENT  Goal: Discharge to post-acute care or home with appropriate resources  Description  INTERVENTIONS:  - Conduct assessment to determine patient/family and health care team treatment goals, and need for post-acute services based on payer coverage, community resources, and patient preferences, and barriers to discharge  - Address psychosocial, clinical, and financial barriers to discharge as identified in assessment in conjunction with the patient/family and health care team  - Arrange appropriate level of post-acute services according to patients   needs and preference and payer coverage in collaboration with the physician and health care team  - Communicate with and update the patient/family, physician, and health care team regarding progress on the discharge plan  - Arrange appropriate transportation to post-acute venues  Outcome: Progressing

## 2019-05-22 NOTE — PROCEDURES
Procedure note:  ELBERT Preliminary Report    Impression:      Probably moderate to severe mitral stenosis  Calcified mitral valve with restricted opening  Gradient is approximately 7 mmHg which is in the moderate range but visually the valve looks more significantly stenotic  Full report to follow

## 2019-05-22 NOTE — PROGRESS NOTES
Progress Note - Emilio Lyon 1955, 70 y o  male MRN: [id number]    Unit/Bed#: E4 -01 Encounter: [id number]    Primary Care Provider: Randel Jeans, DO   Date and time admitted to hospital: 5/15/2019 12:50 PM        Mitral valve stenosis  Assessment & Plan  · Had ELBERT today  · Moderate to severe mitral stenosis  · Will see what cardiology recommends    Acute diastolic (congestive) heart failure ([address] )  Assessment & Plan  Continue IV lasix  Likely home tomorrow    * Acute respiratory failure with hypoxia (HCC)  Assessment & Plan  Getting back to normal breathing  Subjective:   Feels better  Less sob  Weaned off of oxygen  Objective:     Vitals:   Temp (24hrs), Av 2 °F (36 8 °C), Min:97 6 °F (36 4 °C), Max:99 1 °F (37 3 °C)    Temp:  [97 6 °F (36 4 °C)-99 1 °F (37 3 °C)] 97 6 °F (36 4 °C)  HR:  [60-66] 61  Resp:  [16-24] 18  BP: (139-163)/(60-74) 162/73  SpO2:  [92 %-98 %] 96 %  Body mass index is 29 36 kg/m²  Input and Output Summary (last 24 hours): Intake/Output Summary (Last 24 hours) at 2019 1651  Last data filed at 2019 1501  Gross per 24 hour   Intake 340 ml   Output 1450 ml   Net -1110 ml       Physical Exam:     Physical Exam   HENT:   Head: Normocephalic and atraumatic  Eyes: Pupils are equal, round, and reactive to light  EOM are normal    Cardiovascular: Normal rate and regular rhythm  Exam reveals no gallop and no friction rub  No murmur heard  Pulmonary/Chest: Effort normal and breath sounds normal  He has no wheezes  He has no rales  Abdominal: Soft  Bowel sounds are normal  There is no tenderness  Musculoskeletal: He exhibits no edema  Nursing note and vitals reviewed              Additional Data:     Labs:    Results from last 7 days   Lab Units 19  0511   WBC Thousand/uL 8 79   HEMOGLOBIN g/dL 10 3*   HEMATOCRIT % 31 7*   PLATELETS Thousands/uL 284   NEUTROS PCT % 67   LYMPHS PCT % 15   MONOS PCT % 6   EOS PCT % 11*     Results from last 7 days   Lab Units 05/22/19  0511   POTASSIUM mmol/L 3 9   CHLORIDE mmol/L 105   CO2 mmol/L 24   BUN mg/dL 34*   CREATININE mg/dL 1 46*   CALCIUM mg/dL 9 6         Results from last 7 days   Lab Units 05/22/19  1540 05/22/19  1155 05/22/19  0726 05/21/19  1527 05/21/19  1147 05/21/19  0716 05/20/19  1617 05/20/19  1152 05/20/19  0721 05/19/19  2033 05/19/19  1525 05/19/19  1138   POC GLUCOSE mg/dl 137 121 127 158* 210* 147* 185* 208* 127 125 149* 206*               * I Have Reviewed All Lab Data     Recent Cultures (last 7 days):             Last 24 Hours Medication List:     Current Facility-Administered Medications:  acetaminophen 650 mg Oral Q6H PRN Jose Wilson PA-C   allopurinol 300 mg Oral QAM Quinn Hernandez MD   amLODIPine 5 mg Oral Daily Clayton Blue MD   apixaban 5 mg Oral BID Bean Hinds DO   betamethasone valerate 1 application Topical Daily Quinn Hernandez MD   furosemide 80 mg Oral BID (diuretic) Meri Gary MD   insulin lispro 1-5 Units Subcutaneous TID [facility] Quinn Hernandez MD   lidocaine 1 patch Topical Daily Rip Meza PA-C   metoprolol tartrate 50 mg Oral Q12H [address] Quinn Hernandez MD   pravastatin 20 mg Oral Daily With Cesilia Bell MD   sitaGLIPtin 25 mg Oral Daily Quinn Hernandez MD   tamsulosin 0 4 mg Oral Daily With Cesilia Bell MD         VTE Pharmacologic Prophylaxis:   Pharmacologic: Apixaban (Eliquis)      Current Length of Stay: 7 day(s)    Current Patient Status: Inpatient       Discharge Plan: home tomorrow    Code Status: Level 1 - Full Code           Today, Patient Was Seen By: Moni Dutton DO    ** Please Note: Dictation voice to text software may have been used in the creation of this document   **

## 2019-05-23 ENCOUNTER — EPISODE CHANGES (OUTPATIENT)
Dept: CASE MANAGEMENT | Facility: HOSPITAL | Age: 64
End: 2019-05-23

## 2019-05-23 VITALS
BODY MASS INDEX: 28.94 KG/M2 | HEART RATE: 70 BPM | OXYGEN SATURATION: 97 % | DIASTOLIC BLOOD PRESSURE: 77 MMHG | WEIGHT: 201.72 LBS | RESPIRATION RATE: 20 BRPM | TEMPERATURE: 99 F | SYSTOLIC BLOOD PRESSURE: 167 MMHG

## 2019-05-23 DIAGNOSIS — I50.32 CHRONIC DIASTOLIC (CONGESTIVE) HEART FAILURE (HCC): Primary | ICD-10-CM

## 2019-05-23 DIAGNOSIS — I10 ESSENTIAL HYPERTENSION: ICD-10-CM

## 2019-05-23 LAB
ANION GAP SERPL CALCULATED.3IONS-SCNC: 11 MMOL/L (ref 4–13)
BASOPHILS # BLD AUTO: 0.08 THOUSANDS/ΜL (ref 0–0.1)
BASOPHILS NFR BLD AUTO: 1 % (ref 0–1)
BUN SERPL-MCNC: 34 MG/DL (ref 5–25)
CALCIUM SERPL-MCNC: 10.1 MG/DL (ref 8.3–10.1)
CHLORIDE SERPL-SCNC: 104 MMOL/L (ref 100–108)
CO2 SERPL-SCNC: 24 MMOL/L (ref 21–32)
CREAT SERPL-MCNC: 1.39 MG/DL (ref 0.6–1.3)
EOSINOPHIL # BLD AUTO: 1.04 THOUSAND/ΜL (ref 0–0.61)
EOSINOPHIL NFR BLD AUTO: 11 % (ref 0–6)
ERYTHROCYTE [DISTWIDTH] IN BLOOD BY AUTOMATED COUNT: 15 % (ref 11.6–15.1)
GFR SERPL CREATININE-BSD FRML MDRD: 53 ML/MIN/1.73SQ M
GLUCOSE SERPL-MCNC: 107 MG/DL (ref 65–140)
GLUCOSE SERPL-MCNC: 121 MG/DL (ref 65–140)
GLUCOSE SERPL-MCNC: 220 MG/DL (ref 65–140)
HCT VFR BLD AUTO: 33.6 % (ref 36.5–49.3)
HGB BLD-MCNC: 11 G/DL (ref 12–17)
IMM GRANULOCYTES # BLD AUTO: 0.04 THOUSAND/UL (ref 0–0.2)
IMM GRANULOCYTES NFR BLD AUTO: 0 % (ref 0–2)
LYMPHOCYTES # BLD AUTO: 1.54 THOUSANDS/ΜL (ref 0.6–4.47)
LYMPHOCYTES NFR BLD AUTO: 16 % (ref 14–44)
MAGNESIUM SERPL-MCNC: 1.8 MG/DL (ref 1.6–2.6)
MCH RBC QN AUTO: 29.4 PG (ref 26.8–34.3)
MCHC RBC AUTO-ENTMCNC: 32.7 G/DL (ref 31.4–37.4)
MCV RBC AUTO: 90 FL (ref 82–98)
MONOCYTES # BLD AUTO: 0.58 THOUSAND/ΜL (ref 0.17–1.22)
MONOCYTES NFR BLD AUTO: 6 % (ref 4–12)
NEUTROPHILS # BLD AUTO: 6.65 THOUSANDS/ΜL (ref 1.85–7.62)
NEUTS SEG NFR BLD AUTO: 66 % (ref 43–75)
NRBC BLD AUTO-RTO: 0 /100 WBCS
PLATELET # BLD AUTO: 298 THOUSANDS/UL (ref 149–390)
PMV BLD AUTO: 9.5 FL (ref 8.9–12.7)
POTASSIUM SERPL-SCNC: 4.3 MMOL/L (ref 3.5–5.3)
RBC # BLD AUTO: 3.74 MILLION/UL (ref 3.88–5.62)
SODIUM SERPL-SCNC: 139 MMOL/L (ref 136–145)
WBC # BLD AUTO: 9.93 THOUSAND/UL (ref 4.31–10.16)

## 2019-05-23 PROCEDURE — 85025 COMPLETE CBC W/AUTO DIFF WBC: CPT | Performed by: HOSPITALIST

## 2019-05-23 PROCEDURE — 99232 SBSQ HOSP IP/OBS MODERATE 35: CPT | Performed by: INTERNAL MEDICINE

## 2019-05-23 PROCEDURE — 94660 CPAP INITIATION&MGMT: CPT

## 2019-05-23 PROCEDURE — 80048 BASIC METABOLIC PNL TOTAL CA: CPT | Performed by: HOSPITALIST

## 2019-05-23 PROCEDURE — 82948 REAGENT STRIP/BLOOD GLUCOSE: CPT

## 2019-05-23 PROCEDURE — 99239 HOSP IP/OBS DSCHRG MGMT >30: CPT | Performed by: HOSPITALIST

## 2019-05-23 PROCEDURE — 83735 ASSAY OF MAGNESIUM: CPT | Performed by: HOSPITALIST

## 2019-05-23 RX ORDER — FUROSEMIDE 40 MG/1
40 TABLET ORAL EVERY 24 HOURS
Status: DISCONTINUED | OUTPATIENT
Start: 2019-05-23 | End: 2019-05-23 | Stop reason: HOSPADM

## 2019-05-23 RX ORDER — FUROSEMIDE 80 MG
80 TABLET ORAL EVERY MORNING
Status: DISCONTINUED | OUTPATIENT
Start: 2019-05-23 | End: 2019-05-23 | Stop reason: HOSPADM

## 2019-05-23 RX ORDER — POTASSIUM CHLORIDE 20 MEQ/1
20 TABLET, EXTENDED RELEASE ORAL DAILY
Status: DISCONTINUED | OUTPATIENT
Start: 2019-05-24 | End: 2019-05-23 | Stop reason: HOSPADM

## 2019-05-23 RX ORDER — LOSARTAN POTASSIUM 25 MG/1
25 TABLET ORAL DAILY
Status: DISCONTINUED | OUTPATIENT
Start: 2019-05-23 | End: 2019-05-23 | Stop reason: HOSPADM

## 2019-05-23 RX ORDER — FUROSEMIDE 40 MG/1
40 TABLET ORAL SEE ADMIN INSTRUCTIONS
Qty: 270 TABLET | Refills: 3 | Status: SHIPPED | OUTPATIENT
Start: 2019-05-23 | End: 2019-05-23 | Stop reason: SDUPTHER

## 2019-05-23 RX ORDER — METOLAZONE 5 MG/1
5 TABLET ORAL SEE ADMIN INSTRUCTIONS
Qty: 5 TABLET | Refills: 5 | Status: SHIPPED | OUTPATIENT
Start: 2019-05-23 | End: 2020-03-12

## 2019-05-23 RX ORDER — FUROSEMIDE 40 MG/1
40 TABLET ORAL
Qty: 30 TABLET | Refills: 0 | Status: SHIPPED | OUTPATIENT
Start: 2019-05-23 | End: 2019-05-23 | Stop reason: SDUPTHER

## 2019-05-23 RX ORDER — LOSARTAN POTASSIUM 50 MG/1
25 TABLET ORAL DAILY
Qty: 180 TABLET | Refills: 3 | Status: ON HOLD
Start: 2019-05-23 | End: 2020-02-13 | Stop reason: SDUPTHER

## 2019-05-23 RX ORDER — FUROSEMIDE 80 MG
80 TABLET ORAL EVERY MORNING
Qty: 30 TABLET | Refills: 0 | Status: SHIPPED | OUTPATIENT
Start: 2019-05-24 | End: 2019-05-28

## 2019-05-23 RX ORDER — FUROSEMIDE 80 MG
80 TABLET ORAL EVERY MORNING
Qty: 30 TABLET | Refills: 0 | Status: SHIPPED | OUTPATIENT
Start: 2019-05-24 | End: 2019-05-23

## 2019-05-23 RX ORDER — FUROSEMIDE 40 MG/1
40 TABLET ORAL
Qty: 30 TABLET | Refills: 0 | Status: SHIPPED | OUTPATIENT
Start: 2019-05-23 | End: 2019-05-28

## 2019-05-23 RX ORDER — AMLODIPINE BESYLATE 10 MG/1
10 TABLET ORAL DAILY
Status: DISCONTINUED | OUTPATIENT
Start: 2019-05-24 | End: 2019-05-23 | Stop reason: HOSPADM

## 2019-05-23 RX ADMIN — METOPROLOL TARTRATE 50 MG: 50 TABLET, FILM COATED ORAL at 08:41

## 2019-05-23 RX ADMIN — LOSARTAN POTASSIUM 25 MG: 25 TABLET, FILM COATED ORAL at 12:24

## 2019-05-23 RX ADMIN — BETAMETHASONE VALERATE 1 APPLICATION: 1 CREAM TOPICAL at 08:44

## 2019-05-23 RX ADMIN — APIXABAN 5 MG: 5 TABLET, FILM COATED ORAL at 08:41

## 2019-05-23 RX ADMIN — FUROSEMIDE 80 MG: 80 TABLET ORAL at 12:24

## 2019-05-23 RX ADMIN — ALLOPURINOL 300 MG: 100 TABLET ORAL at 08:41

## 2019-05-23 RX ADMIN — INSULIN LISPRO 1 UNITS: 100 INJECTION, SOLUTION INTRAVENOUS; SUBCUTANEOUS at 11:07

## 2019-05-23 RX ADMIN — SITAGLIPTIN 25 MG: 50 TABLET, FILM COATED ORAL at 08:41

## 2019-05-23 RX ADMIN — AMLODIPINE BESYLATE 5 MG: 5 TABLET ORAL at 08:40

## 2019-05-23 NOTE — DISCHARGE SUMMARY
Discharge- [facility] 1955, 70 y o  male MRN: [id number]    Unit/Bed#: E4 -01 Encounter: [id number]    Primary Care Provider: Robert Portillo DO   Date and time admitted to hospital: 5/15/2019 12:50 PM        * Acute respiratory failure with hypoxia [facility])  Assessment & Plan  Due to acute decompensated diastolic CHF    Acute pulmonary edema (HCC)  Assessment & Plan  Weaned off of oxgyen  Diuresed  well    Mitral valve stenosis  Assessment & Plan  · Had ELBERT   · Moderate to severe mitral stenosis  · Will follow up with cardiology     Acute renal failure superimposed on stage 3 chronic kidney disease ([facility] )  Assessment & Plan  · With mildly elevated creatinine of 1 56 on admission  · Acute renal failure resolved  · Creatinine now at baseline at 1 1 and below baseline    Type 2 diabetes mellitus with chronic kidney disease, without long-term current use of insulin [facility])  Assessment & Plan  Lab Results   Component Value Date    HGBA1C 6 9 (H) 03/07/2019       Recent Labs     05/22/19  1540 05/22/19  2051 05/23/19  0724 05/23/19  1025   POCGLU 137 201* 121 220*       Blood Sugar Average: Last 72 hrs:    Resume home medications    Acute diastolic (congestive) heart failure ([facility] )  Assessment & Plan  Diuresed well with Lasix  Home today    PAF (paroxysmal atrial fibrillation) ([facility] )  Assessment & Plan  · Fully anticoagulated on Eliquis    Status post permanent pacemaker        Discharging Physician / Practitioner: Calin Pérez DO  PCP: Robert Portillo DO  Admission Date:   Admission Orders (From admission, onward)    Ordered        05/15/19 1651  Inpatient Admission  Once             Discharge Date: 05/23/19    Resolved Problems  Date Reviewed: 5/23/2019          Resolved    Hyperkalemia 5/17/2019     Resolved by  Clara Staley MD            Consultations During Hospital Stay:  · cardiology      Procedures Performed:     · ELBERT  · Cardiac cath      Reason for Admission:  shortness of breath      Hospital Course:     Meghan Calabrese is a 70 y o  male patient who originally presented to the hospital on 5/15/2019 due to shortness of breath  He is found to have acute flash pulmonary edema  This was related to acute diastolic decompensated congestive heart failure as well as moderate to severe mitral stenosis  Patient diuresed well  He underwent cardiac catheterization  No intervention was necessary  He underwent transesophageal echocardiogram to get a better look at his mitral valve  He does have moderate to severe mitral stenosis  He will follow up with Cardiology to see if at some point he needs intervention on this valve  Cardiology has recommended an increase in his home diuretics  Patient diuresed well here in the hospital with IV Lasix  He is ready to go home  He is weaned off of oxygen  Please see above list of diagnoses and related plan for additional information  Condition at Discharge: good       Discharge Day Visit / Exam:     Subjective:  Feels better  No sob  Ready to go home  Vitals: Blood Pressure: 167/77 (05/23/19 0720)  Pulse: 70 (05/23/19 0720)  Temperature: 99 °F (37 2 °C) (05/23/19 0720)  Temp Source: Tympanic (05/23/19 0720)  Respirations: 20 (05/23/19 0720)  Weight - Scale: 91 5 kg (201 lb 11 5 oz) (05/23/19 0559)  SpO2: 97 % (05/23/19 0720)    Exam:     Physical Exam   HENT:   Head: Normocephalic and atraumatic  Eyes: Pupils are equal, round, and reactive to light  EOM are normal    Cardiovascular: Normal rate and regular rhythm  Exam reveals no gallop and no friction rub  No murmur heard  Pulmonary/Chest: Effort normal and breath sounds normal  He has no wheezes  He has no rales  Abdominal: Soft  Bowel sounds are normal  There is no tenderness  Musculoskeletal: He exhibits no edema  Nursing note and vitals reviewed                Discharge instructions/Information to patient and family:   See after visit summary for information provided to patient and family  Provisions for Follow-Up Care:  See after visit summary for information related to follow-up care and any pertinent home health orders  Disposition:     Home       Discharge Statement:  I spent 38 minutes discharging the patient  This time was spent on the day of discharge  I had direct contact with the patient on the day of discharge  Greater than 50% of the total time was spent examining patient, answering all patient questions, arranging and discussing plan of care with patient as well as directly providing post-discharge instructions  Additional time then spent on discharge activities  Discharge Medications:  See after visit summary for reconciled discharge medications provided to patient and family        ** Please Note: This note has been constructed using a voice recognition system **

## 2019-05-23 NOTE — ASSESSMENT & PLAN NOTE
Lab Results   Component Value Date    HGBA1C 6 9 (H) 03/07/2019       Recent Labs     05/22/19  1540 05/22/19 2051 05/23/19  0724 05/23/19  1025   POCGLU 137 201* 121 220*       Blood Sugar Average: Last 72 hrs:    Resume home medications

## 2019-05-23 NOTE — PLAN OF CARE
Problem: Potential for Falls  Goal: Patient will remain free of falls  Description  INTERVENTIONS:  - Assess patient frequently for physical needs  -  Identify cognitive and physical deficits and behaviors that affect risk of falls    -  [place] fall precautions as indicated by assessment   - Educate patient/family on patient safety including physical limitations  - Instruct patient to call for assistance with activity based on assessment  - Modify environment to reduce risk of injury  - Consider OT/PT consult to assist with strengthening/mobility  Outcome: Progressing     Problem: PAIN - ADULT  Goal: Verbalizes/displays adequate comfort level or baseline comfort level  Description  Interventions:  - Encourage patient to monitor pain and request assistance  - Assess pain using appropriate pain scale  - Administer analgesics based on type and severity of pain and evaluate response  - Implement non-pharmacological measures as appropriate and evaluate response  - Consider cultural and social influences on pain and pain management  - Notify physician/advanced practitioner if interventions unsuccessful or patient reports new pain  Outcome: Progressing     Problem: DISCHARGE PLANNING  Goal: Discharge to home or other facility with appropriate resources  Description  INTERVENTIONS:  - Identify barriers to discharge w/patient and caregiver  - Arrange for needed discharge resources and transportation as appropriate  - Identify discharge learning needs (meds, wound care, etc )  - Arrange for interpretive services to assist at discharge as needed  - Refer to Case Management Department for coordinating discharge planning if the patient needs post-hospital services based on physician/advanced practitioner order or complex needs related to functional status, cognitive ability, or social support system  Outcome: Progressing     Problem: CARDIOVASCULAR - ADULT  Goal: Maintains optimal cardiac output and hemodynamic stability  Description  INTERVENTIONS:  - Monitor I/O, vital signs and rhythm  - Monitor for S/S and trends of decreased cardiac output i e  bleeding, hypotension  - Administer and titrate ordered vasoactive medications to optimize hemodynamic stability  - Assess quality of pulses, skin color and temperature  - Assess for signs of decreased coronary artery perfusion - ex   Angina  - Instruct patient to report change in severity of symptoms  Outcome: Progressing     Problem: RESPIRATORY - ADULT  Goal: Achieves optimal ventilation and oxygenation  Description  INTERVENTIONS:  - Assess for changes in respiratory status  - Assess for changes in mentation and behavior  - Position to facilitate oxygenation and minimize respiratory effort  - Oxygen administration by appropriate delivery method based on oxygen saturation (per order) or ABGs  - Initiate smoking cessation education as indicated  - Encourage broncho-pulmonary hygiene including cough, deep breathe, Incentive Spirometry  - Assess the need for suctioning and aspirate as needed  - Assess and instruct to report SOB or any respiratory difficulty  - Respiratory Therapy support as indicated  Outcome: Progressing     Problem: INFECTION - ADULT  Goal: Absence or prevention of progression during hospitalization  Description  INTERVENTIONS:  - Assess and monitor for signs and symptoms of infection  - Monitor lab/diagnostic results  - Monitor all insertion sites, i e  indwelling lines, tubes, and drains  - Monitor endotracheal (as able) and nasal secretions for changes in amount and color  - [place] appropriate cooling/warming therapies per order  - Administer medications as ordered  - Instruct and encourage patient and family to use good hand hygiene technique  - Identify and instruct in appropriate isolation precautions for identified infection/condition  Outcome: Progressing  Goal: Absence of fever/infection during neutropenic period  Description  INTERVENTIONS:  - Monitor WBC  - Implement neutropenic guidelines  Outcome: Progressing     Problem: SAFETY ADULT  Goal: Patient will remain free of falls  Description  INTERVENTIONS:  - Assess patient frequently for physical needs  -  Identify cognitive and physical deficits and behaviors that affect risk of falls    -  [place] fall precautions as indicated by assessment   - Educate patient/family on patient safety including physical limitations  - Instruct patient to call for assistance with activity based on assessment  - Modify environment to reduce risk of injury  - Consider OT/PT consult to assist with strengthening/mobility  Outcome: Progressing  Goal: Maintain or return to baseline ADL function  Description  INTERVENTIONS:  -  Assess patient's ability to carry out ADLs; assess patient's baseline for ADL function and identify physical deficits which impact ability to perform ADLs (bathing, care of mouth/teeth, toileting, grooming, dressing, etc )  - Assess/evaluate cause of self-care deficits   - Assess range of motion  - Assess patient's mobility; develop plan if impaired  - Assess patient's need for assistive devices and provide as appropriate  - Encourage maximum independence but intervene and supervise when necessary  ¯ Involve family in performance of ADLs  ¯ Assess for home care needs following discharge   ¯ Request OT consult to assist with ADL evaluation and planning for discharge  ¯ Provide patient education as appropriate  Outcome: Progressing  Goal: Maintain or return mobility status to optimal level  Description  INTERVENTIONS:  - Assess patient's baseline mobility status (ambulation, transfers, stairs, etc )    - Identify cognitive and physical deficits and behaviors that affect mobility  - Identify mobility aids required to assist with transfers and/or ambulation (gait belt, sit-to-stand, lift, walker, cane, etc )  - [place] fall precautions as indicated by assessment  - Record patient progress and toleration of activity level on Mobility SBAR; progress patient to next Phase/Stage  - Instruct patient to call for assistance with activity based on assessment  - Request Rehabilitation consult to assist with strengthening/weightbearing, etc   Outcome: Progressing     Problem: Knowledge Deficit  Goal: Patient/family/caregiver demonstrates understanding of disease process, treatment plan, medications, and discharge instructions  Description  Complete learning assessment and assess knowledge base    Interventions:  - Provide teaching at level of understanding  - Provide teaching via preferred learning methods  Outcome: Progressing     Problem: METABOLIC, FLUID AND ELECTROLYTES - ADULT  Goal: Electrolytes maintained within normal limits  Description  INTERVENTIONS:  - Monitor labs and assess patient for signs and symptoms of electrolyte imbalances  - Administer electrolyte replacement as ordered  - Monitor response to electrolyte replacements, including repeat lab results as appropriate  - Instruct patient on fluid and nutrition as appropriate  Outcome: Progressing  Goal: Fluid balance maintained  Description  INTERVENTIONS:  - Monitor labs and assess for signs and symptoms of volume excess or deficit  - Monitor I/O and WT  - Instruct patient on fluid and nutrition as appropriate  Outcome: Progressing  Goal: Glucose maintained within target range  Description  INTERVENTIONS:  - Monitor Blood Glucose as ordered  - Assess for signs and symptoms of hyperglycemia and hypoglycemia  - Administer ordered medications to maintain glucose within target range  - Assess nutritional intake and initiate nutrition service referral as needed  Outcome: Progressing     Problem: DISCHARGE PLANNING - CARE MANAGEMENT  Goal: Discharge to post-acute care or home with appropriate resources  Description  INTERVENTIONS:  - Conduct assessment to determine patient/family and health care team treatment goals, and need for post-acute services based on payer coverage, community resources, and patient preferences, and barriers to discharge  - Address psychosocial, clinical, and financial barriers to discharge as identified in assessment in conjunction with the patient/family and health care team  - Arrange appropriate level of post-acute services according to patients   needs and preference and payer coverage in collaboration with the physician and health care team  - Communicate with and update the patient/family, physician, and health care team regarding progress on the discharge plan  - Arrange appropriate transportation to post-acute venues  Outcome: Progressing

## 2019-05-23 NOTE — PLAN OF CARE
Problem: Potential for Falls  Goal: Patient will remain free of falls  Description  INTERVENTIONS:  - Assess patient frequently for physical needs  -  Identify cognitive and physical deficits and behaviors that affect risk of falls    -  [place] fall precautions as indicated by assessment   - Educate patient/family on patient safety including physical limitations  - Instruct patient to call for assistance with activity based on assessment  - Modify environment to reduce risk of injury  - Consider OT/PT consult to assist with strengthening/mobility  Outcome: Progressing     Problem: PAIN - ADULT  Goal: Verbalizes/displays adequate comfort level or baseline comfort level  Description  Interventions:  - Encourage patient to monitor pain and request assistance  - Assess pain using appropriate pain scale  - Administer analgesics based on type and severity of pain and evaluate response  - Implement non-pharmacological measures as appropriate and evaluate response  - Consider cultural and social influences on pain and pain management  - Notify physician/advanced practitioner if interventions unsuccessful or patient reports new pain  Outcome: Progressing     Problem: DISCHARGE PLANNING  Goal: Discharge to home or other facility with appropriate resources  Description  INTERVENTIONS:  - Identify barriers to discharge w/patient and caregiver  - Arrange for needed discharge resources and transportation as appropriate  - Identify discharge learning needs (meds, wound care, etc )  - Arrange for interpretive services to assist at discharge as needed  - Refer to Case Management Department for coordinating discharge planning if the patient needs post-hospital services based on physician/advanced practitioner order or complex needs related to functional status, cognitive ability, or social support system  Outcome: Progressing     Problem: CARDIOVASCULAR - ADULT  Goal: Maintains optimal cardiac output and hemodynamic stability  Description  INTERVENTIONS:  - Monitor I/O, vital signs and rhythm  - Monitor for S/S and trends of decreased cardiac output i e  bleeding, hypotension  - Administer and titrate ordered vasoactive medications to optimize hemodynamic stability  - Assess quality of pulses, skin color and temperature  - Assess for signs of decreased coronary artery perfusion - ex   Angina  - Instruct patient to report change in severity of symptoms  Outcome: Progressing     Problem: RESPIRATORY - ADULT  Goal: Achieves optimal ventilation and oxygenation  Description  INTERVENTIONS:  - Assess for changes in respiratory status  - Assess for changes in mentation and behavior  - Position to facilitate oxygenation and minimize respiratory effort  - Oxygen administration by appropriate delivery method based on oxygen saturation (per order) or ABGs  - Initiate smoking cessation education as indicated  - Encourage broncho-pulmonary hygiene including cough, deep breathe, Incentive Spirometry  - Assess the need for suctioning and aspirate as needed  - Assess and instruct to report SOB or any respiratory difficulty  - Respiratory Therapy support as indicated  Outcome: Progressing     Problem: INFECTION - ADULT  Goal: Absence or prevention of progression during hospitalization  Description  INTERVENTIONS:  - Assess and monitor for signs and symptoms of infection  - Monitor lab/diagnostic results  - Monitor all insertion sites, i e  indwelling lines, tubes, and drains  - Monitor endotracheal (as able) and nasal secretions for changes in amount and color  - [place] appropriate cooling/warming therapies per order  - Administer medications as ordered  - Instruct and encourage patient and family to use good hand hygiene technique  - Identify and instruct in appropriate isolation precautions for identified infection/condition  Outcome: Progressing  Goal: Absence of fever/infection during neutropenic period  Description  INTERVENTIONS:  - Monitor WBC  - Implement neutropenic guidelines  Outcome: Progressing     Problem: SAFETY ADULT  Goal: Patient will remain free of falls  Description  INTERVENTIONS:  - Assess patient frequently for physical needs  -  Identify cognitive and physical deficits and behaviors that affect risk of falls    -  [place] fall precautions as indicated by assessment   - Educate patient/family on patient safety including physical limitations  - Instruct patient to call for assistance with activity based on assessment  - Modify environment to reduce risk of injury  - Consider OT/PT consult to assist with strengthening/mobility  Outcome: Progressing  Goal: Maintain or return to baseline ADL function  Description  INTERVENTIONS:  -  Assess patient's ability to carry out ADLs; assess patient's baseline for ADL function and identify physical deficits which impact ability to perform ADLs (bathing, care of mouth/teeth, toileting, grooming, dressing, etc )  - Assess/evaluate cause of self-care deficits   - Assess range of motion  - Assess patient's mobility; develop plan if impaired  - Assess patient's need for assistive devices and provide as appropriate  - Encourage maximum independence but intervene and supervise when necessary  ¯ Involve family in performance of ADLs  ¯ Assess for home care needs following discharge   ¯ Request OT consult to assist with ADL evaluation and planning for discharge  ¯ Provide patient education as appropriate  Outcome: Progressing  Goal: Maintain or return mobility status to optimal level  Description  INTERVENTIONS:  - Assess patient's baseline mobility status (ambulation, transfers, stairs, etc )    - Identify cognitive and physical deficits and behaviors that affect mobility  - Identify mobility aids required to assist with transfers and/or ambulation (gait belt, sit-to-stand, lift, walker, cane, etc )  - [place] fall precautions as indicated by assessment  - Record patient progress and toleration of activity level on Mobility SBAR; progress patient to next Phase/Stage  - Instruct patient to call for assistance with activity based on assessment  - Request Rehabilitation consult to assist with strengthening/weightbearing, etc   Outcome: Progressing     Problem: Knowledge Deficit  Goal: Patient/family/caregiver demonstrates understanding of disease process, treatment plan, medications, and discharge instructions  Description  Complete learning assessment and assess knowledge base    Interventions:  - Provide teaching at level of understanding  - Provide teaching via preferred learning methods  Outcome: Progressing     Problem: METABOLIC, FLUID AND ELECTROLYTES - ADULT  Goal: Electrolytes maintained within normal limits  Description  INTERVENTIONS:  - Monitor labs and assess patient for signs and symptoms of electrolyte imbalances  - Administer electrolyte replacement as ordered  - Monitor response to electrolyte replacements, including repeat lab results as appropriate  - Instruct patient on fluid and nutrition as appropriate  Outcome: Progressing  Goal: Fluid balance maintained  Description  INTERVENTIONS:  - Monitor labs and assess for signs and symptoms of volume excess or deficit  - Monitor I/O and WT  - Instruct patient on fluid and nutrition as appropriate  Outcome: Progressing  Goal: Glucose maintained within target range  Description  INTERVENTIONS:  - Monitor Blood Glucose as ordered  - Assess for signs and symptoms of hyperglycemia and hypoglycemia  - Administer ordered medications to maintain glucose within target range  - Assess nutritional intake and initiate nutrition service referral as needed  Outcome: Progressing     Problem: DISCHARGE PLANNING - CARE MANAGEMENT  Goal: Discharge to post-acute care or home with appropriate resources  Description  INTERVENTIONS:  - Conduct assessment to determine patient/family and health care team treatment goals, and need for post-acute services based on payer coverage, community resources, and patient preferences, and barriers to discharge  - Address psychosocial, clinical, and financial barriers to discharge as identified in assessment in conjunction with the patient/family and health care team  - Arrange appropriate level of post-acute services according to patients   needs and preference and payer coverage in collaboration with the physician and health care team  - Communicate with and update the patient/family, physician, and health care team regarding progress on the discharge plan  - Arrange appropriate transportation to post-acute venues  Outcome: Progressing

## 2019-05-23 NOTE — PROGRESS NOTES
Progress Note - Doy Gut 70 y o  male MRN: [id number]    Unit/Bed#: E4 -01 Encounter: [id number]  Subjective:   No chest pain  Breathing has been fairly good  No orthopnea  Minor edema  No palpitations or lightheadedness    Objective:   Vitals: Blood pressure 167/77, pulse 70, temperature 99 °F (37 2 °C), temperature source Tympanic, resp  rate 20, weight 91 5 kg (201 lb 11 5 oz), SpO2 97 %  ,Body mass index is 28 94 kg/m²  CBC with diff:   Results from last 7 days   Lab Units 05/23/19  0527   WBC Thousand/uL 9 93   RBC Million/uL 3 74*   HEMOGLOBIN g/dL 11 0*   HEMATOCRIT % 33 6*   MCV fL 90   MCH pg 29 4   MCHC g/dL 32 7   RDW % 15 0   MPV fL 9 5   PLATELETS Thousands/uL 298     CMP:   Results from last 7 days   Lab Units 05/23/19  0527   SODIUM mmol/L 139   POTASSIUM mmol/L 4 3   CHLORIDE mmol/L 104   CO2 mmol/L 24   BUN mg/dL 34*   CREATININE mg/dL 1 39*   CALCIUM mg/dL 10 1   EGFR ml/min/1 73sq m 53     Magnesium:   Results from last 7 days   Lab Units 05/23/19  0527   MAGNESIUM mg/dL 1 8       Physical exam:  Lungs-somewhat decreased breath sounds at the left base  No rales rhonchi or wheezes  Heart-regular with grade 2-3 systolic murmur at the base  No diastolic murmur rub or gallop  Abdomen-soft nontender without mass or organomegaly  Extremities-1+ lower extremity edema    Assessment:  1  Flash pulmonary edema  Transesophageal echo somewhat equivocal unlikely does not have critical mitral stenosis  Does not have CAD causes  Feel that we probably did not have patient out of heart failure on discharge completely any was prone to acute decompensation with modest weight gain  We have not excluded renal artery stenosis and will do so as an outpatient  2  Acute on chronic diastolic heart failure  Patient is down about 15 lb and is much better  He probably has a small left pleural effusion but this is improved  Plan to discharge on a much higher dose of furosemide any had been taking  To see if he has recurrent problems  Might have justification for mitral valve replacement if he continues to decompensate since mitral stenosis may be playing a role in decompensation although it does not appear to be critical  3  Hypertension  Blood pressure remains elevated on amlodipine and metoprolol- will watch closely going forward  Renal artery ultrasound to be done  Losartan will be added at a lower dosage of 25 mg daily  4  Complete heart 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 permanent pacemaker  5  Paroxysmal atrial fibrillation at the time of acute illness  Patient all potential rate control with metoprolol  On Eliquis for stroke prophylaxis  6  Chronic kidney disease  Creatinine has been stable with diuresis  7  Diabetes mellitus    Plan:  Ok to home on following cardiac meds  Furosemide 80 mg in the a m , 40 mg in the p m   Increase dosage  Eliquis 5 mg b i d -new  Amlodipine 10 mg daily-same  Metoprolol 50 mg q 12 hours-same  Losartan 25 mg daily    Reduced dosage  Simvastatin 20 mg daily  KCL 20 meq daily    Same  Will prescribe metolazone 5 mg to be taken in case patient has rapid weight gain  This is only under the direction of hospice    Will arrange follow-up within two weeks time  Should have a BMP next week    Melina Ortiz MD

## 2019-05-23 NOTE — SOCIAL WORK
HEART FAILURE CARE COORDINATOR: Met with patient and wife prior to discharge  They are very engaged in following the set medical regimen and are very aware of strategies to monitor for signs and symptoms of heart failure at home

## 2019-05-24 ENCOUNTER — PATIENT OUTREACH (OUTPATIENT)
Dept: FAMILY MEDICINE CLINIC | Facility: CLINIC | Age: 64
End: 2019-05-24

## 2019-05-24 ENCOUNTER — TELEPHONE (OUTPATIENT)
Dept: ENDOCRINOLOGY | Facility: CLINIC | Age: 64
End: 2019-05-24

## 2019-05-24 ENCOUNTER — TRANSITIONAL CARE MANAGEMENT (OUTPATIENT)
Dept: FAMILY MEDICINE CLINIC | Facility: CLINIC | Age: 64
End: 2019-05-24

## 2019-05-26 DIAGNOSIS — E13.8 DIABETES MELLITUS OF OTHER TYPE WITH COMPLICATION, UNSPECIFIED WHETHER LONG TERM INSULIN USE: ICD-10-CM

## 2019-05-28 ENCOUNTER — OFFICE VISIT (OUTPATIENT)
Dept: FAMILY MEDICINE CLINIC | Facility: CLINIC | Age: 64
End: 2019-05-28
Payer: MEDICARE

## 2019-05-28 VITALS
HEART RATE: 64 BPM | WEIGHT: 206 LBS | HEIGHT: 71 IN | RESPIRATION RATE: 16 BRPM | BODY MASS INDEX: 28.84 KG/M2 | OXYGEN SATURATION: 98 % | SYSTOLIC BLOOD PRESSURE: 134 MMHG | DIASTOLIC BLOOD PRESSURE: 70 MMHG | TEMPERATURE: 97.1 F

## 2019-05-28 DIAGNOSIS — J96.01 ACUTE RESPIRATORY FAILURE WITH HYPOXIA (HCC): ICD-10-CM

## 2019-05-28 DIAGNOSIS — J81.0 ACUTE PULMONARY EDEMA (HCC): Primary | ICD-10-CM

## 2019-05-28 DIAGNOSIS — I05.0 MITRAL VALVE STENOSIS, UNSPECIFIED ETIOLOGY: ICD-10-CM

## 2019-05-28 DIAGNOSIS — N18.30 TYPE 2 DIABETES MELLITUS WITH STAGE 3 CHRONIC KIDNEY DISEASE, WITHOUT LONG-TERM CURRENT USE OF INSULIN (HCC): ICD-10-CM

## 2019-05-28 DIAGNOSIS — N17.9 ACUTE KIDNEY INJURY (HCC): ICD-10-CM

## 2019-05-28 DIAGNOSIS — E11.22 TYPE 2 DIABETES MELLITUS WITH STAGE 3 CHRONIC KIDNEY DISEASE, WITHOUT LONG-TERM CURRENT USE OF INSULIN (HCC): ICD-10-CM

## 2019-05-28 DIAGNOSIS — I50.31 ACUTE DIASTOLIC (CONGESTIVE) HEART FAILURE (HCC): ICD-10-CM

## 2019-05-28 PROCEDURE — 99496 TRANSJ CARE MGMT HIGH F2F 7D: CPT | Performed by: FAMILY MEDICINE

## 2019-05-28 RX ORDER — FUROSEMIDE 40 MG/1
TABLET ORAL
COMMUNITY
End: 2019-06-20 | Stop reason: SDUPTHER

## 2019-06-04 ENCOUNTER — PATIENT OUTREACH (OUTPATIENT)
Dept: FAMILY MEDICINE CLINIC | Facility: CLINIC | Age: 64
End: 2019-06-04

## 2019-06-05 ENCOUNTER — TELEPHONE (OUTPATIENT)
Dept: NEPHROLOGY | Facility: CLINIC | Age: 64
End: 2019-06-05

## 2019-06-05 ENCOUNTER — OFFICE VISIT (OUTPATIENT)
Dept: CARDIOLOGY CLINIC | Facility: CLINIC | Age: 64
End: 2019-06-05
Payer: MEDICARE

## 2019-06-05 ENCOUNTER — REMOTE DEVICE CLINIC VISIT (OUTPATIENT)
Dept: CARDIOLOGY CLINIC | Facility: CLINIC | Age: 64
End: 2019-06-05
Payer: MEDICARE

## 2019-06-05 VITALS
SYSTOLIC BLOOD PRESSURE: 150 MMHG | HEIGHT: 70 IN | BODY MASS INDEX: 29.29 KG/M2 | OXYGEN SATURATION: 95 % | HEART RATE: 84 BPM | DIASTOLIC BLOOD PRESSURE: 60 MMHG | WEIGHT: 204.6 LBS

## 2019-06-05 DIAGNOSIS — I48.0 PAF (PAROXYSMAL ATRIAL FIBRILLATION) (HCC): ICD-10-CM

## 2019-06-05 DIAGNOSIS — I10 ESSENTIAL HYPERTENSION: ICD-10-CM

## 2019-06-05 DIAGNOSIS — I50.31 ACUTE DIASTOLIC (CONGESTIVE) HEART FAILURE (HCC): ICD-10-CM

## 2019-06-05 DIAGNOSIS — I44.2 CHB (COMPLETE HEART BLOCK) (HCC): ICD-10-CM

## 2019-06-05 DIAGNOSIS — I44.2 THIRD DEGREE HEART BLOCK (HCC): ICD-10-CM

## 2019-06-05 DIAGNOSIS — I50.32 CHRONIC DIASTOLIC (CONGESTIVE) HEART FAILURE (HCC): Primary | ICD-10-CM

## 2019-06-05 DIAGNOSIS — I50.31 ACUTE DIASTOLIC (CONGESTIVE) HEART FAILURE (HCC): Primary | ICD-10-CM

## 2019-06-05 DIAGNOSIS — Z95.0 PACEMAKER: Primary | ICD-10-CM

## 2019-06-05 PROCEDURE — 93294 REM INTERROG EVL PM/LDLS PM: CPT | Performed by: INTERNAL MEDICINE

## 2019-06-05 PROCEDURE — 99214 OFFICE O/P EST MOD 30 MIN: CPT | Performed by: PHYSICIAN ASSISTANT

## 2019-06-05 PROCEDURE — 93296 REM INTERROG EVL PM/IDS: CPT | Performed by: INTERNAL MEDICINE

## 2019-06-05 RX ORDER — POTASSIUM CHLORIDE 1500 MG/1
20 TABLET, FILM COATED, EXTENDED RELEASE ORAL DAILY
Qty: 30 TABLET | Refills: 5 | Status: SHIPPED | OUTPATIENT
Start: 2019-06-05 | End: 2019-07-23 | Stop reason: SDUPTHER

## 2019-06-06 RX ORDER — POTASSIUM CHLORIDE 1500 MG/1
20 TABLET, FILM COATED, EXTENDED RELEASE ORAL DAILY
Qty: 30 TABLET | Refills: 5 | OUTPATIENT
Start: 2019-06-06

## 2019-06-10 ENCOUNTER — TELEPHONE (OUTPATIENT)
Dept: CARDIOLOGY CLINIC | Facility: CLINIC | Age: 64
End: 2019-06-10

## 2019-06-11 ENCOUNTER — TELEPHONE (OUTPATIENT)
Dept: CARDIOLOGY CLINIC | Facility: CLINIC | Age: 64
End: 2019-06-11

## 2019-06-12 ENCOUNTER — APPOINTMENT (OUTPATIENT)
Dept: LAB | Facility: CLINIC | Age: 64
End: 2019-06-12
Payer: MEDICARE

## 2019-06-12 DIAGNOSIS — E11.40 TYPE 2 DIABETES MELLITUS WITH DIABETIC NEUROPATHY, WITHOUT LONG-TERM CURRENT USE OF INSULIN (HCC): Primary | ICD-10-CM

## 2019-06-12 DIAGNOSIS — E78.2 MIXED HYPERLIPIDEMIA: ICD-10-CM

## 2019-06-12 DIAGNOSIS — I10 ESSENTIAL HYPERTENSION: ICD-10-CM

## 2019-06-12 LAB
ALBUMIN SERPL BCP-MCNC: 3.7 G/DL (ref 3.5–5)
ALP SERPL-CCNC: 79 U/L (ref 46–116)
ALT SERPL W P-5'-P-CCNC: 32 U/L (ref 12–78)
ANION GAP SERPL CALCULATED.3IONS-SCNC: 9 MMOL/L (ref 4–13)
AST SERPL W P-5'-P-CCNC: 22 U/L (ref 5–45)
BASOPHILS # BLD AUTO: 0.08 THOUSANDS/ΜL (ref 0–0.1)
BASOPHILS NFR BLD AUTO: 1 % (ref 0–1)
BILIRUB SERPL-MCNC: 0.65 MG/DL (ref 0.2–1)
BUN SERPL-MCNC: 52 MG/DL (ref 5–25)
CALCIUM SERPL-MCNC: 9.7 MG/DL (ref 8.3–10.1)
CHLORIDE SERPL-SCNC: 104 MMOL/L (ref 100–108)
CHOLEST SERPL-MCNC: 108 MG/DL (ref 50–200)
CO2 SERPL-SCNC: 22 MMOL/L (ref 21–32)
CREAT SERPL-MCNC: 1.76 MG/DL (ref 0.6–1.3)
CREAT UR-MCNC: 147 MG/DL
EOSINOPHIL # BLD AUTO: 1.03 THOUSAND/ΜL (ref 0–0.61)
EOSINOPHIL NFR BLD AUTO: 7 % (ref 0–6)
ERYTHROCYTE [DISTWIDTH] IN BLOOD BY AUTOMATED COUNT: 14.8 % (ref 11.6–15.1)
EST. AVERAGE GLUCOSE BLD GHB EST-MCNC: 137 MG/DL
GFR SERPL CREATININE-BSD FRML MDRD: 40 ML/MIN/1.73SQ M
GLUCOSE P FAST SERPL-MCNC: 182 MG/DL (ref 65–99)
HBA1C MFR BLD: 6.4 % (ref 4.2–6.3)
HCT VFR BLD AUTO: 34.3 % (ref 36.5–49.3)
HDLC SERPL-MCNC: 31 MG/DL (ref 40–60)
HGB BLD-MCNC: 11.2 G/DL (ref 12–17)
IMM GRANULOCYTES # BLD AUTO: 0.06 THOUSAND/UL (ref 0–0.2)
IMM GRANULOCYTES NFR BLD AUTO: 0 % (ref 0–2)
LDLC SERPL CALC-MCNC: 45 MG/DL (ref 0–100)
LYMPHOCYTES # BLD AUTO: 1.62 THOUSANDS/ΜL (ref 0.6–4.47)
LYMPHOCYTES NFR BLD AUTO: 11 % (ref 14–44)
MCH RBC QN AUTO: 29 PG (ref 26.8–34.3)
MCHC RBC AUTO-ENTMCNC: 32.7 G/DL (ref 31.4–37.4)
MCV RBC AUTO: 89 FL (ref 82–98)
MICROALBUMIN UR-MCNC: 4300 MG/L (ref 0–20)
MICROALBUMIN/CREAT 24H UR: 2925 MG/G CREATININE (ref 0–30)
MONOCYTES # BLD AUTO: 0.71 THOUSAND/ΜL (ref 0.17–1.22)
MONOCYTES NFR BLD AUTO: 5 % (ref 4–12)
NEUTROPHILS # BLD AUTO: 10.75 THOUSANDS/ΜL (ref 1.85–7.62)
NEUTS SEG NFR BLD AUTO: 76 % (ref 43–75)
NRBC BLD AUTO-RTO: 0 /100 WBCS
PLATELET # BLD AUTO: 286 THOUSANDS/UL (ref 149–390)
PMV BLD AUTO: 10.3 FL (ref 8.9–12.7)
POTASSIUM SERPL-SCNC: 4.7 MMOL/L (ref 3.5–5.3)
PROT SERPL-MCNC: 7.5 G/DL (ref 6.4–8.2)
RBC # BLD AUTO: 3.86 MILLION/UL (ref 3.88–5.62)
SODIUM SERPL-SCNC: 135 MMOL/L (ref 136–145)
T4 FREE SERPL-MCNC: 1.06 NG/DL (ref 0.76–1.46)
TRIGL SERPL-MCNC: 159 MG/DL
TSH SERPL DL<=0.05 MIU/L-ACNC: 4.17 UIU/ML (ref 0.36–3.74)
WBC # BLD AUTO: 14.25 THOUSAND/UL (ref 4.31–10.16)

## 2019-06-12 PROCEDURE — 80061 LIPID PANEL: CPT

## 2019-06-12 PROCEDURE — 85025 COMPLETE CBC W/AUTO DIFF WBC: CPT | Performed by: PHYSICIAN ASSISTANT

## 2019-06-12 PROCEDURE — 83036 HEMOGLOBIN GLYCOSYLATED A1C: CPT

## 2019-06-12 PROCEDURE — 82570 ASSAY OF URINE CREATININE: CPT

## 2019-06-12 PROCEDURE — 84439 ASSAY OF FREE THYROXINE: CPT

## 2019-06-12 PROCEDURE — 36415 COLL VENOUS BLD VENIPUNCTURE: CPT | Performed by: PHYSICIAN ASSISTANT

## 2019-06-12 PROCEDURE — 82043 UR ALBUMIN QUANTITATIVE: CPT

## 2019-06-12 PROCEDURE — 84443 ASSAY THYROID STIM HORMONE: CPT

## 2019-06-12 PROCEDURE — 80053 COMPREHEN METABOLIC PANEL: CPT

## 2019-06-14 ENCOUNTER — TELEPHONE (OUTPATIENT)
Dept: CARDIOLOGY CLINIC | Facility: CLINIC | Age: 64
End: 2019-06-14

## 2019-06-20 ENCOUNTER — OFFICE VISIT (OUTPATIENT)
Dept: ENDOCRINOLOGY | Facility: CLINIC | Age: 64
End: 2019-06-20
Payer: MEDICARE

## 2019-06-20 VITALS
WEIGHT: 206 LBS | BODY MASS INDEX: 29.49 KG/M2 | DIASTOLIC BLOOD PRESSURE: 78 MMHG | HEIGHT: 70 IN | HEART RATE: 91 BPM | SYSTOLIC BLOOD PRESSURE: 160 MMHG

## 2019-06-20 DIAGNOSIS — E78.2 MIXED HYPERLIPIDEMIA: ICD-10-CM

## 2019-06-20 DIAGNOSIS — I50.31 ACUTE DIASTOLIC (CONGESTIVE) HEART FAILURE (HCC): ICD-10-CM

## 2019-06-20 DIAGNOSIS — N18.30 TYPE 2 DIABETES MELLITUS WITH STAGE 3 CHRONIC KIDNEY DISEASE, WITHOUT LONG-TERM CURRENT USE OF INSULIN (HCC): Primary | ICD-10-CM

## 2019-06-20 DIAGNOSIS — I10 ESSENTIAL HYPERTENSION: ICD-10-CM

## 2019-06-20 DIAGNOSIS — E16.2 HYPOGLYCEMIA: ICD-10-CM

## 2019-06-20 DIAGNOSIS — E11.8 TYPE 2 DIABETES MELLITUS WITH COMPLICATION, UNSPECIFIED WHETHER LONG TERM INSULIN USE: ICD-10-CM

## 2019-06-20 DIAGNOSIS — E13.8 DIABETES MELLITUS OF OTHER TYPE WITH COMPLICATION, UNSPECIFIED WHETHER LONG TERM INSULIN USE: ICD-10-CM

## 2019-06-20 DIAGNOSIS — E11.22 TYPE 2 DIABETES MELLITUS WITH STAGE 3 CHRONIC KIDNEY DISEASE, WITHOUT LONG-TERM CURRENT USE OF INSULIN (HCC): Primary | ICD-10-CM

## 2019-06-20 PROCEDURE — 99214 OFFICE O/P EST MOD 30 MIN: CPT | Performed by: NURSE PRACTITIONER

## 2019-06-20 RX ORDER — FUROSEMIDE 40 MG/1
40 TABLET ORAL 2 TIMES DAILY
Start: 2019-06-20 | End: 2019-11-27 | Stop reason: SDUPTHER

## 2019-06-26 DIAGNOSIS — E78.2 MIXED HYPERLIPIDEMIA: ICD-10-CM

## 2019-06-26 DIAGNOSIS — R33.9 URINARY RETENTION: ICD-10-CM

## 2019-06-26 RX ORDER — TAMSULOSIN HYDROCHLORIDE 0.4 MG/1
0.4 CAPSULE ORAL
Qty: 90 CAPSULE | Refills: 3 | Status: SHIPPED | OUTPATIENT
Start: 2019-06-26 | End: 2019-09-03

## 2019-06-27 ENCOUNTER — HOSPITAL ENCOUNTER (OUTPATIENT)
Dept: NON INVASIVE DIAGNOSTICS | Facility: CLINIC | Age: 64
Discharge: HOME | End: 2019-06-27
Payer: MEDICARE

## 2019-06-27 DIAGNOSIS — I10 ESSENTIAL HYPERTENSION: ICD-10-CM

## 2019-06-27 DIAGNOSIS — I50.32 CHRONIC DIASTOLIC (CONGESTIVE) HEART FAILURE (HCC): ICD-10-CM

## 2019-06-27 PROCEDURE — 93975 VASCULAR STUDY: CPT | Performed by: SURGERY

## 2019-06-27 PROCEDURE — 93975 VASCULAR STUDY: CPT

## 2019-06-28 ENCOUNTER — TELEPHONE (OUTPATIENT)
Dept: CARDIOLOGY CLINIC | Facility: CLINIC | Age: 64
End: 2019-06-28

## 2019-07-01 ENCOUNTER — PATIENT OUTREACH (OUTPATIENT)
Dept: FAMILY MEDICINE CLINIC | Facility: CLINIC | Age: 64
End: 2019-07-01

## 2019-07-01 NOTE — PROGRESS NOTES
Percy Giles is managing well at home and denies needing additional assistance  His weight has remained stable, he denies sob or chest pain  He has some LE edema that is his norm  Nutritional intake adequate, he is following a low sodium, diabetic diet  He performs glucometer checks three times a day  Blood sugar is averaging 130, he goes as high as 170  No hypoglycemic episodes noted  He is taking all medications as prescribed  Follow up appointments scheduled  He is agreeable to further outreach

## 2019-07-17 ENCOUNTER — TELEPHONE (OUTPATIENT)
Dept: ENDOCRINOLOGY | Facility: CLINIC | Age: 64
End: 2019-07-17

## 2019-07-17 NOTE — TELEPHONE ENCOUNTER
Frequent hyperglycemia noted on BG log   If he is currently taking 16 units of Xultophy please increase to 18 units and send in BG log in 1-2 weeks for review

## 2019-07-22 ENCOUNTER — APPOINTMENT (OUTPATIENT)
Dept: LAB | Facility: CLINIC | Age: 64
End: 2019-07-22
Payer: MEDICARE

## 2019-07-22 DIAGNOSIS — N18.30 CHRONIC KIDNEY DISEASE, STAGE III (MODERATE) (HCC): ICD-10-CM

## 2019-07-22 DIAGNOSIS — I10 ESSENTIAL HYPERTENSION: ICD-10-CM

## 2019-07-22 DIAGNOSIS — E87.2 METABOLIC ACIDOSIS: ICD-10-CM

## 2019-07-22 LAB
25(OH)D3 SERPL-MCNC: 29.9 NG/ML (ref 30–100)
ALBUMIN SERPL BCP-MCNC: 3.6 G/DL (ref 3.5–5)
ALP SERPL-CCNC: 98 U/L (ref 46–116)
ALT SERPL W P-5'-P-CCNC: 47 U/L (ref 12–78)
ANION GAP SERPL CALCULATED.3IONS-SCNC: 5 MMOL/L (ref 4–13)
AST SERPL W P-5'-P-CCNC: 34 U/L (ref 5–45)
BACTERIA UR QL AUTO: ABNORMAL /HPF
BASOPHILS # BLD AUTO: 0.09 THOUSANDS/ΜL (ref 0–0.1)
BASOPHILS NFR BLD AUTO: 1 % (ref 0–1)
BILIRUB SERPL-MCNC: 0.5 MG/DL (ref 0.2–1)
BILIRUB UR QL STRIP: NEGATIVE
BUN SERPL-MCNC: 39 MG/DL (ref 5–25)
CALCIUM SERPL-MCNC: 9.5 MG/DL (ref 8.3–10.1)
CHLORIDE SERPL-SCNC: 105 MMOL/L (ref 100–108)
CLARITY UR: CLEAR
CO2 SERPL-SCNC: 24 MMOL/L (ref 21–32)
COLOR UR: YELLOW
CREAT SERPL-MCNC: 1.18 MG/DL (ref 0.6–1.3)
CREAT UR-MCNC: 47.3 MG/DL
EOSINOPHIL # BLD AUTO: 0.5 THOUSAND/ΜL (ref 0–0.61)
EOSINOPHIL NFR BLD AUTO: 5 % (ref 0–6)
ERYTHROCYTE [DISTWIDTH] IN BLOOD BY AUTOMATED COUNT: 14.9 % (ref 11.6–15.1)
GFR SERPL CREATININE-BSD FRML MDRD: 64 ML/MIN/1.73SQ M
GLUCOSE P FAST SERPL-MCNC: 139 MG/DL (ref 65–99)
GLUCOSE UR STRIP-MCNC: NEGATIVE MG/DL
HCT VFR BLD AUTO: 36 % (ref 36.5–49.3)
HGB BLD-MCNC: 11.7 G/DL (ref 12–17)
HGB UR QL STRIP.AUTO: NEGATIVE
HYALINE CASTS #/AREA URNS LPF: ABNORMAL /LPF
IMM GRANULOCYTES # BLD AUTO: 0.03 THOUSAND/UL (ref 0–0.2)
IMM GRANULOCYTES NFR BLD AUTO: 0 % (ref 0–2)
KETONES UR STRIP-MCNC: NEGATIVE MG/DL
LEUKOCYTE ESTERASE UR QL STRIP: NEGATIVE
LYMPHOCYTES # BLD AUTO: 1.45 THOUSANDS/ΜL (ref 0.6–4.47)
LYMPHOCYTES NFR BLD AUTO: 15 % (ref 14–44)
MAGNESIUM SERPL-MCNC: 2.3 MG/DL (ref 1.6–2.6)
MCH RBC QN AUTO: 29.1 PG (ref 26.8–34.3)
MCHC RBC AUTO-ENTMCNC: 32.5 G/DL (ref 31.4–37.4)
MCV RBC AUTO: 90 FL (ref 82–98)
MONOCYTES # BLD AUTO: 0.69 THOUSAND/ΜL (ref 0.17–1.22)
MONOCYTES NFR BLD AUTO: 7 % (ref 4–12)
NEUTROPHILS # BLD AUTO: 6.64 THOUSANDS/ΜL (ref 1.85–7.62)
NEUTS SEG NFR BLD AUTO: 72 % (ref 43–75)
NITRITE UR QL STRIP: NEGATIVE
NON-SQ EPI CELLS URNS QL MICRO: ABNORMAL /HPF
NRBC BLD AUTO-RTO: 0 /100 WBCS
PH UR STRIP.AUTO: 6 [PH]
PHOSPHATE SERPL-MCNC: 4.5 MG/DL (ref 2.3–4.1)
PLATELET # BLD AUTO: 266 THOUSANDS/UL (ref 149–390)
PMV BLD AUTO: 10.2 FL (ref 8.9–12.7)
POTASSIUM SERPL-SCNC: 4.5 MMOL/L (ref 3.5–5.3)
PROT SERPL-MCNC: 7.4 G/DL (ref 6.4–8.2)
PROT UR STRIP-MCNC: ABNORMAL MG/DL
PROT UR-MCNC: 194 MG/DL
PROT/CREAT UR: 4.1 MG/G{CREAT} (ref 0–0.1)
PTH-INTACT SERPL-MCNC: 23 PG/ML (ref 18.4–80.1)
RBC # BLD AUTO: 4.02 MILLION/UL (ref 3.88–5.62)
RBC #/AREA URNS AUTO: ABNORMAL /HPF
SODIUM SERPL-SCNC: 134 MMOL/L (ref 136–145)
SP GR UR STRIP.AUTO: 1.01 (ref 1–1.03)
UROBILINOGEN UR QL STRIP.AUTO: 0.2 E.U./DL
WBC # BLD AUTO: 9.4 THOUSAND/UL (ref 4.31–10.16)
WBC #/AREA URNS AUTO: ABNORMAL /HPF

## 2019-07-22 PROCEDURE — 84156 ASSAY OF PROTEIN URINE: CPT | Performed by: INTERNAL MEDICINE

## 2019-07-22 PROCEDURE — 80053 COMPREHEN METABOLIC PANEL: CPT | Performed by: INTERNAL MEDICINE

## 2019-07-22 PROCEDURE — 81001 URINALYSIS AUTO W/SCOPE: CPT | Performed by: INTERNAL MEDICINE

## 2019-07-22 PROCEDURE — 83735 ASSAY OF MAGNESIUM: CPT | Performed by: INTERNAL MEDICINE

## 2019-07-22 PROCEDURE — 36415 COLL VENOUS BLD VENIPUNCTURE: CPT | Performed by: INTERNAL MEDICINE

## 2019-07-22 PROCEDURE — 85025 COMPLETE CBC W/AUTO DIFF WBC: CPT

## 2019-07-22 PROCEDURE — 82306 VITAMIN D 25 HYDROXY: CPT | Performed by: INTERNAL MEDICINE

## 2019-07-22 PROCEDURE — 84100 ASSAY OF PHOSPHORUS: CPT | Performed by: INTERNAL MEDICINE

## 2019-07-22 PROCEDURE — 82570 ASSAY OF URINE CREATININE: CPT | Performed by: INTERNAL MEDICINE

## 2019-07-22 PROCEDURE — 83970 ASSAY OF PARATHORMONE: CPT | Performed by: INTERNAL MEDICINE

## 2019-07-23 ENCOUNTER — OFFICE VISIT (OUTPATIENT)
Dept: CARDIOLOGY CLINIC | Facility: CLINIC | Age: 64
End: 2019-07-23
Payer: MEDICARE

## 2019-07-23 VITALS
BODY MASS INDEX: 28.49 KG/M2 | WEIGHT: 199 LBS | SYSTOLIC BLOOD PRESSURE: 142 MMHG | HEART RATE: 76 BPM | DIASTOLIC BLOOD PRESSURE: 60 MMHG | HEIGHT: 70 IN

## 2019-07-23 DIAGNOSIS — I35.0 NONRHEUMATIC AORTIC VALVE STENOSIS: ICD-10-CM

## 2019-07-23 DIAGNOSIS — I10 ESSENTIAL HYPERTENSION: ICD-10-CM

## 2019-07-23 DIAGNOSIS — I34.2 NON-RHEUMATIC MITRAL VALVE STENOSIS: ICD-10-CM

## 2019-07-23 DIAGNOSIS — I50.32 CHRONIC DIASTOLIC (CONGESTIVE) HEART FAILURE (HCC): Primary | ICD-10-CM

## 2019-07-23 DIAGNOSIS — E78.2 MIXED HYPERLIPIDEMIA: ICD-10-CM

## 2019-07-23 DIAGNOSIS — I50.31 ACUTE DIASTOLIC (CONGESTIVE) HEART FAILURE (HCC): ICD-10-CM

## 2019-07-23 DIAGNOSIS — I48.0 PAF (PAROXYSMAL ATRIAL FIBRILLATION) (HCC): ICD-10-CM

## 2019-07-23 DIAGNOSIS — I44.2 COMPLETE HEART BLOCK (HCC): ICD-10-CM

## 2019-07-23 PROCEDURE — 99214 OFFICE O/P EST MOD 30 MIN: CPT | Performed by: INTERNAL MEDICINE

## 2019-07-23 RX ORDER — POTASSIUM CHLORIDE 1500 MG/1
20 TABLET, FILM COATED, EXTENDED RELEASE ORAL EVERY OTHER DAY
Qty: 15 TABLET | Refills: 5
Start: 2019-07-23 | End: 2019-12-03 | Stop reason: ALTCHOICE

## 2019-07-23 NOTE — PROGRESS NOTES
Cardiology Follow Up    [facility]  1955  [id number]  [address]  [address]  [facility] [address] [phone]    Reason for visit: One month FU for chronic diastolic CHF, Moderate MS, CHB s/p PPM in December of 2018, HTN, HLP and Mild AS    1  Chronic diastolic (congestive) heart failure ([address] )     2  Non-rheumatic mitral valve stenosis     3  Complete heart block ([facility] )     4  Essential hypertension     5  Mixed hyperlipidemia     6  Nonrheumatic aortic valve stenosis         Interval History: Since his last visit he has done well  Teresa Love He denies dyspnea  He has only mild edema  He is watching his diet closely  He denies chest pain  He denies palpitations or dizziness  His wts have been stable  Teresa Love His BPs at home have been good       Patient Active Problem List   Diagnosis    Bilateral leg edema    Diabetes mellitus with neuropathy ([facility] )    Diabetic foot ulcer ([facility] )    DM type 2, not at goal [facility])    Easy bruising    Eczema    Erectile dysfunction of non-organic origin    Gout    Hyperlipidemia    Nephropathy    Osteoarthritis    Peripheral arterial disease (HCC)    PVCs (premature ventricular contractions)    Rhinitis    Systolic murmur    Vertigo    Complete heart block (HCC)    Metabolic acidosis    Idiopathic hypotension    Other hyperlipidemia    Sepsis (HCC)    PAF (paroxysmal atrial fibrillation) (HCC)    Acute respiratory failure with hypoxia (HCC)    Persistent proteinuria    Volume overload    Urinary retention    Third degree heart block (HCC)    NICOLASA (obstructive sleep apnea)    Acute diastolic (congestive) heart failure (HCC)    Type 2 diabetes mellitus with chronic kidney disease, without long-term current use of insulin (HCC)    Essential hypertension    Acute renal failure superimposed on stage 3 chronic kidney disease (HCC)    Nonrheumatic aortic valve stenosis    Hypoglycemia    Anemia    Leukocytosis    Fever    Mitral valve stenosis    Abnormal CT of the chest    Acute pulmonary edema (HCC)    Chronic diastolic (congestive) heart failure (HCC)     Past Medical History:   Diagnosis Date    Arthritis     OA    Bruise of both arms     forearms and both hands    Bruises easily     CHF (congestive heart failure) [facility] )     Diabetes mellitus ([facility] )     Diabetic foot ulcer (HCC)     Eczema     Erectile dysfunction     Gout     Hyperlipidemia     Hypertension     Murmur     Nephropathy     Osteoarthritis     [facility]     Seasonal allergies     Toe infection     bilat great toes    Vertigo     Walks frequently     Wears dentures     upper    Wears glasses      Social History     Socioeconomic History    Marital status: /Civil Union     Spouse name: Not on file    Number of children: Not on file    Years of education: Not on file    Highest education level: Not on file   Occupational History    Not on file   Social Needs    Financial resource strain: Not on file    Food insecurity:     Worry: Not on file     Inability: Not on file    Transportation needs:     Medical: Not on file     Non-medical: Not on file   Tobacco Use    Smoking status: Former Smoker     Packs/day: 1 00     Years: 30 00     Pack years: 30 00     Types: Cigarettes    Smokeless tobacco: Never Used    Tobacco comment: quit 10 years ago   Substance and Sexual Activity    Alcohol use: Never     Frequency: Never    Drug use: Never    Sexual activity: Not on file   Lifestyle    Physical activity:     Days per week: Not on file     Minutes per session: Not on file    Stress: Not on file   Relationships    Social connections:     Talks on phone: Not on file     Gets together: Not on file     Attends Faith service: Not on file     Active member of club or organization: Not on file     Attends meetings of clubs or organizations: Not on file     Relationship status: Not on file    Intimate partner violence:     Fear of current or ex partner: Not on file     Emotionally abused: Not on file     Physically abused: Not on file     Forced sexual activity: Not on file   Other Topics Concern    Not on file   Social History Narrative    ** Merged History Encounter **         Daily cola consumption (2 cans/day)      Family History   Problem Relation Age of Onset    Heart disease Father     No Known Problems Mother     Hypertension Father     Pulmonary embolism Father      Past Surgical History:   Procedure Laterality Date    CARDIAC PACEMAKER PLACEMENT      CARPAL TUNNEL RELEASE Left     CARPAL TUNNEL RELEASE Right     CARPAL TUNNEL RELEASE      KNEE ARTHROSCOPY Left     KNEE ARTHROSCOPY Right     KNEE SURGERY      NE AMPUTATION TOE,I-P JT Bilateral 5/2/2017    Procedure: PARTIAL AMPUTATION RIGHT AND LEFT HALLUX ;  Surgeon: Veronica Cleary DPM;  Location: [facility];  Service: Podiatry    NE AMPUTATION TOE,MT-P JT Left 7/25/2017    Procedure: 2ND TOE AMPUTATION;  Surgeon: Veronica Cleary DPM;  Location: [facility];  Service: Podiatry    SHOULDER ARTHROSCOPY Left     with screws,RTC    SHOULDER SURGERY      VASECTOMY         Current Outpatient Medications:     allopurinol (ZYLOPRIM) 300 mg tablet, TAKE 1 TABLET (300 MG TOTAL) BY MOUTH EVERY MORNING, Disp: 90 tablet, Rfl: 2    amLODIPine (NORVASC) 10 mg tablet, Take 1 tablet (10 mg total) by mouth daily, Disp: 90 tablet, Rfl: 3    apixaban (ELIQUIS) 5 mg, Take 1 tablet (5 mg total) by mouth every 12 (twelve) hours, Disp: 60 tablet, Rfl: 0    Cholecalciferol (VITAMIN D-3) 1000 units CAPS, Take 1 capsule by mouth every morning  , Disp: , Rfl:     Dupilumab, Asthma, (DUPIXENT SC), Inject under the skin, Disp: , Rfl:     furosemide (LASIX) 40 mg tablet, Take 1 tablet (40 mg total) by mouth 2 (two) times a day Take two tablets every morning and one tablet at 2pm, Disp: , Rfl:     glucagon (GLUCAGON EMERGENCY) 1 MG injection, Inject 1 mg under the skin once as needed for low blood sugar for up to 1 dose, Disp: 1 kit, Rfl: 2    hydrOXYzine HCL (ATARAX) 10 mg tablet, Take 10 mg by mouth every 6 (six) hours as needed  , Disp: , Rfl:     Insulin Degludec-Liraglutide (XULTOPHY) 100 units-3 6 mg/mL injection pen, Inject 16 Units under the skin daily for 30 days, Disp: 2 pen, Rfl: 6    losartan (COZAAR) 50 mg tablet, Take 0 5 tablets (25 mg total) by mouth daily, Disp: 180 tablet, Rfl: 3    metFORMIN (GLUCOPHAGE) 500 mg tablet, Take 1 tablet (500 mg total) by mouth 2 (two) times a day with meals [id number] /twice a day, Disp: 360 tablet, Rfl: 3    metolazone (ZAROXOLYN) 5 mg tablet, Take 1 tablet (5 mg total) by mouth see administration instructions One tablet every 6 days prn and as directed by physician, Disp: 5 tablet, Rfl: 5    metoprolol tartrate (LOPRESSOR) 50 mg tablet, Take 1 tablet (50 mg total) by mouth every 12 (twelve) hours, Disp: 180 tablet, Rfl: 3    Multiple Vitamin (MULTIVITAMIN) tablet, Take 1 tablet by mouth daily IN AM, Disp: , Rfl:     omega-3-acid ethyl esters (LOVAZA) 1 g capsule, Take 2 capsules (2 g total) by mouth 2 (two) times a day, Disp: 360 capsule, Rfl: 3    Potassium Chloride ER 20 MEQ TBCR, Take 1 tablet (20 mEq total) by mouth daily, Disp: 30 tablet, Rfl: 5    simvastatin (ZOCOR) 20 mg tablet, Take 1 tablet (20 mg total) by mouth daily at bedtime, Disp: 90 tablet, Rfl: 3    tamsulosin (FLOMAX) 0 4 mg, Take 1 capsule (0 4 mg total) by mouth daily with dinner, Disp: 90 capsule, Rfl: 3    betamethasone valerate (VALISONE) 0 1 % cream, APPLY TO BACK,ARMS,CHEST,LEGS AND ABDOMEN TWICE A DAY, Disp: , Rfl: 3    glucose blood (ONE TOUCH ULTRA TEST) test strip, 1 each by Other route 3 (three) times a day Use to test blood sugar, Disp: 130 each, Rfl: 6  Allergies   Allergen Reactions    Invokana [Canagliflozin]     Lamisil [Terbinafine] Rash    Lamisil [Terbinafine] Blisters     Wife states " His skin peeled from head to toe"    Other Swelling     Pomegranate - facial swelling, no swelling of tongue, esophagus  Adhesive tape   Latex Rash       Result Date: 6/27/2019  Narrative:  [facility] REPORT CLINICAL: Indications:  Patient presents to evaluate the renal arteries secondary to controlled HTN and congestive heart failure  Patient is currently on multiple medications for blood pressure  Operative History: Pacemaker partial B/L hallux amputation Risk Factors The patient has history of HTN, Diabetes, HLD, CKD, PAD, A-Fib, CHF and previous smoking (quit 5-10 yrs ago)  FINDINGS:  Unilateral       Impression  PSV (cm/s)  EDV (cm/s)    RI  Sup-Anny Ao                           99          17  0 83  Celiac           >70%               319          41        Px Inf-Jerad Ao                                        0 98  Prox   SMA                           248          24        Jux Renal Aorta                      84           2         Right          Impression  PSV (cm/s)  EDV (cm/s)   RAR    RI  Kidney (cm)  Ostial Renal                      179          15  1 81                     Prox Renal                        170           5  1 72  0 97               Mid Renal                         150          16  1 51  0 89               Dist Renal                        153          13  1 55  0 92               Celiac Artery                      29           4        0 87               Kidney         Normal                                                13 00  Hilum 1                            24           4        0 83               Mid Pole                           29           4        0 86               Hilum 3                            27           3        0 89               Renal          Pulsatile                                                     Left           Impression  PSV (cm/s)  EDV (cm/s)   RAR    RI  Kidney (cm)  Ostial Renal   60 - 99%           213          13  2 15 Prox Renal                        181          12  1 83  0 93               Mid Renal      60 - 99%           240           0  2 43  1 00               Dist Renal                        153          13  1 55                     Celiac Artery                      42           4        0 90               Kidney         Normal                                                13 90  Hilum 1                            32           5        0 84               Mid Pole                           42           4        0 90               Hilum 3                            33           2        0 94               Renal          Pulsatile                                                       CONCLUSION:  Impression The abdominal aorta is widely patent and normal caliber  RIGHT RENAL: No evidence of significant arterial occlusive disease in the main renal artery  Pulsatile renal vein  Renovascular resistive index of 0 8, indicative of parenchymal disease  Renal/Aorta Ratio: 1 81  The Kidney measures 13 cm  LEFT RENAL: There is evidence of a >60% stenosis in the ostium and mid renal artery  Pulsatile renal vein  Renovascular resistive index of 0 9, indicative of parenchymal disease  Renal/Aorta Ratio: 2 4  The Kidney measures 13 9 cm  MESENTERIC: There is evidence of a >70% stenosis in the celiac artery  Superior mesenteric artery is patent  Technically challenging evaluation due to body habitus and overlying bowel gas  There is no previous study for comparison  SIGNATURE: Electronically Signed by: Latosha Greenwood MD on 2019-06-27 03:33:24 PM      Review of Systems:  Review of Systems   Constitutional: Positive for fatigue  Negative for activity change, appetite change and unexpected weight change  Respiratory: Negative for chest tightness, shortness of breath and wheezing  Cardiovascular: Positive for leg swelling  Negative for chest pain and palpitations     Gastrointestinal: Negative for abdominal pain, blood in stool and constipation  Genitourinary: Positive for frequency and urgency  Negative for dysuria and hematuria  Musculoskeletal: Positive for arthralgias  Negative for back pain, gait problem and joint swelling  Neurological: Negative for dizziness, syncope, speech difficulty, light-headedness and headaches  Psychiatric/Behavioral: Negative for agitation, behavioral problems, confusion and decreased concentration  Physical Exam:  Vitals:    07/23/19 1024   BP: 142/60   Pulse: 76   Weight: 90 3 kg (199 lb)   Height: 5' 10" (1 778 m)       Physical Exam   Constitutional: He is oriented to person, place, and time  He appears well-developed and well-nourished  No distress  HENT:   Head: Normocephalic and atraumatic  Mouth/Throat: Oropharynx is clear and moist    Eyes: Conjunctivae are normal  No scleral icterus  Neck: Neck supple  Normal carotid pulses and no JVD present  Carotid bruit is not present  No thyromegaly present  Cardiovascular: Normal rate, regular rhythm and intact distal pulses  Exam reveals no gallop and no friction rub  Murmur heard  Crescendo decrescendo systolic murmur is present with a grade of 2/6  No diastolic murmur is present  Pulmonary/Chest: He has decreased breath sounds  He has no wheezes  He has no rhonchi  He has no rales  Abdominal: Soft  He exhibits no mass  There is no hepatosplenomegaly  There is no tenderness  Musculoskeletal: He exhibits edema (1+ edema of the LEs)  He exhibits no tenderness or deformity  Neurological: He is alert and oriented to person, place, and time  He has normal strength  No cranial nerve deficit or sensory deficit  Skin: Skin is warm and dry  No rash noted  No erythema  No pallor  Psychiatric: He has a normal mood and affect  His behavior is normal  Judgment and thought content normal        Discussion/Summary:  1  Chronic diastolic heart failure  Patient well compensated on furosemide 40 mg b i d    Has not had to use metolazone  Will continue to be vigilant of his weight and call if there is any significant weight gain or increasing edema  2  Mitral stenosis  Felt to be moderate on extensive testing in the hospital   Will observe going forward  No intervention Plan  3  Complete heart block status post permanent pacemaker last year  Normally functioning device  4  Hypertension  Blood pressure today a bit elevated on the systolic reading however readings at home have been excellent  Continue amlodipine, metoprolol and losartan at the current dosages  Renal artery ultrasound did show unilateral lesion approaching significance  No intervention planned at this time  Aggressive risk factor modification  5  Mixed hyperlipidemia  LDL cholesterol in the 40s on current dose of simvastatin  Continue same  6  Very mild aortic stenosis  Should not be problematic for some time  7  PAF  Patient on beta-blocker for potential rate control  On Eliquis for stroke prophylaxis  On interrogations we have not seen much atrial fibrillation and note this did occur during acute illness last year  May consider stopping Eliquis at a future visit if interrogations continue to show no atrial fibrillation  Of course with mitral stenosis he may be prone to this  FU 4 months  However reduce potassium to every other day since potassium high normal and he does have renal dysfunction, diabetes and is on losartan          Agueda Zavala MD

## 2019-07-29 ENCOUNTER — PATIENT MESSAGE (OUTPATIENT)
Dept: ENDOCRINOLOGY | Facility: CLINIC | Age: 64
End: 2019-07-29

## 2019-07-29 ENCOUNTER — OFFICE VISIT (OUTPATIENT)
Dept: NEPHROLOGY | Facility: CLINIC | Age: 64
End: 2019-07-29
Payer: MEDICARE

## 2019-07-29 VITALS
BODY MASS INDEX: 28.63 KG/M2 | HEIGHT: 70 IN | HEART RATE: 87 BPM | WEIGHT: 200 LBS | DIASTOLIC BLOOD PRESSURE: 71 MMHG | SYSTOLIC BLOOD PRESSURE: 154 MMHG

## 2019-07-29 DIAGNOSIS — I70.1 LEFT RENAL ARTERY STENOSIS (HCC): ICD-10-CM

## 2019-07-29 DIAGNOSIS — E87.79 OTHER HYPERVOLEMIA: ICD-10-CM

## 2019-07-29 DIAGNOSIS — I50.31 ACUTE DIASTOLIC (CONGESTIVE) HEART FAILURE (HCC): ICD-10-CM

## 2019-07-29 DIAGNOSIS — G47.33 OSA (OBSTRUCTIVE SLEEP APNEA): ICD-10-CM

## 2019-07-29 DIAGNOSIS — E11.22 TYPE 2 DIABETES MELLITUS WITH STAGE 3 CHRONIC KIDNEY DISEASE, WITHOUT LONG-TERM CURRENT USE OF INSULIN (HCC): Primary | ICD-10-CM

## 2019-07-29 DIAGNOSIS — E87.2 METABOLIC ACIDOSIS: ICD-10-CM

## 2019-07-29 DIAGNOSIS — N17.9 ACUTE RENAL FAILURE SUPERIMPOSED ON STAGE 3 CHRONIC KIDNEY DISEASE, UNSPECIFIED ACUTE RENAL FAILURE TYPE: ICD-10-CM

## 2019-07-29 DIAGNOSIS — N28.9 NEPHROPATHY: ICD-10-CM

## 2019-07-29 DIAGNOSIS — R33.9 URINARY RETENTION: ICD-10-CM

## 2019-07-29 DIAGNOSIS — N18.3 ACUTE RENAL FAILURE SUPERIMPOSED ON STAGE 3 CHRONIC KIDNEY DISEASE, UNSPECIFIED ACUTE RENAL FAILURE TYPE: ICD-10-CM

## 2019-07-29 DIAGNOSIS — N18.30 TYPE 2 DIABETES MELLITUS WITH STAGE 3 CHRONIC KIDNEY DISEASE, WITHOUT LONG-TERM CURRENT USE OF INSULIN (HCC): Primary | ICD-10-CM

## 2019-07-29 PROCEDURE — 99214 OFFICE O/P EST MOD 30 MIN: CPT | Performed by: INTERNAL MEDICINE

## 2019-07-29 NOTE — PROGRESS NOTES
OFFICE follow-up- Nephrology   Jc Castro 70 y o  male MRN: [id number]    Encounter: [id number]          ASSESSMENT and PLAN:  Blayne Guo was seen today for an initial consultation    He is 70years old with a past medical history of type 2 diabetes mellitus times 20 years, hypertension that is uncontrolled, gout with no recent attack, hyperlipidemia who presents for evaluation of proteinuria    Diagnoses and all orders for this visit:    Essential (primary) hypertension  - blood pressures at home are averaging in the 887-017 systolic range  -managed him on his efforts he has lost 30 lb over a year  - given his age and comorbidities his goal blood pressure should be 120 to 130 over 70-80  - he is currently on amlodipine 10 mg daily, metoprolol 50 mg twice daily, losartan was decreased to 25 mg daily and chlorthalidone  -secondary workup included a repeat renal artery ultrasound which does show a left renal artery stenosis  - his echocardiogram was reviewed as well he should have further follow-up with cardiology to monitor his valvular heart disease- he does have LVH so blood pressure control is very important and this was discussed in detail with him at his last office appointment    Persistent proteinuria  - in the setting of prolonged diabetes  -he does have nephrotic range proteinuria serologic workup is negative and his creatinine is stable  -we will continue to monitor with ARB   -worsening proteinuria or increasing creatinine should prompt a renal biopsy    Nephropathy  -serologies negative will monitor at this point    Gout  - on allopurinol 300 mg daily  - has been gout free, will monitor and continue    Bilateral leg edema  - left greater than right,  - He does take furosemide as needed however have asked him to adhere to a low-sodium diet and try compression stockings or Ace wrap, he denies any pain any warmth in the area on his right leg    Left renal artery stenosis  -renal artery Doppler showed 60% stenosis  -can be indication and cause of flash pulmonary edema  -mitral valve disease also present    Follow-up in 6 months with repeat blood work    HPI:  Gogo Avila is a 70 y  o male who was referred by Aamir Rosenthal for evaluation of  Initial evaluation    He is 70years old and has a history of type 2 diabetes mellitus currently on oral medications stage IIIA to 2 chronic kidney disease mitral valve prolapse,    His last office visit he has had 3 hospitalizations the 1st was a pneumonia in the 2nd 1 was for flash pulmonary edema, 3rd 1 as well for falls pulmonary edema he has mitral valve disease and a left renal artery stenosis that is thought to be the etiology of this is going walking hour every day he currently denies any acute chest pain or shortness of breath no fevers no chills no nausea vomiting diarrhea or constipation      ROS: All the systems were reviewed and were negative except as documented on the HPI  Allergies: Jacqui Worth; Lamisil [terbinafine]; Lamisil [terbinafine];  Other; and Latex    Medications:   Current Outpatient Medications:     allopurinol (ZYLOPRIM) 300 mg tablet, TAKE 1 TABLET (300 MG TOTAL) BY MOUTH EVERY MORNING, Disp: 90 tablet, Rfl: 2    amLODIPine (NORVASC) 10 mg tablet, Take 1 tablet (10 mg total) by mouth daily, Disp: 90 tablet, Rfl: 3    apixaban (ELIQUIS) 5 mg, Take 1 tablet (5 mg total) by mouth every 12 (twelve) hours, Disp: 60 tablet, Rfl: 0    betamethasone valerate (VALISONE) 0 1 % cream, APPLY TO BACK,ARMS,CHEST,LEGS AND ABDOMEN TWICE A DAY, Disp: , Rfl: 3    Cholecalciferol (VITAMIN D-3) 1000 units CAPS, Take 1 capsule by mouth every morning  , Disp: , Rfl:     Dupilumab, Asthma, (DUPIXENT SC), Inject under the skin, Disp: , Rfl:     furosemide (LASIX) 40 mg tablet, Take 1 tablet (40 mg total) by mouth 2 (two) times a day Take two tablets every morning and one tablet at 2pm, Disp: , Rfl:     glucagon (GLUCAGON EMERGENCY) 1 MG injection, Inject 1 mg under the skin once as needed for low blood sugar for up to 1 dose, Disp: 1 kit, Rfl: 2    glucose blood (ONE TOUCH ULTRA TEST) test strip, 1 each by Other route 3 (three) times a day Use to test blood sugar, Disp: 130 each, Rfl: 6    hydrOXYzine HCL (ATARAX) 10 mg tablet, Take 10 mg by mouth every 6 (six) hours as needed  , Disp: , Rfl:     losartan (COZAAR) 50 mg tablet, Take 0 5 tablets (25 mg total) by mouth daily, Disp: 180 tablet, Rfl: 3    metFORMIN (GLUCOPHAGE) 500 mg tablet, Take 1 tablet (500 mg total) by mouth 2 (two) times a day with meals [id number] /twice a day, Disp: 360 tablet, Rfl: 3    metolazone (ZAROXOLYN) 5 mg tablet, Take 1 tablet (5 mg total) by mouth see administration instructions One tablet every 6 days prn and as directed by physician, Disp: 5 tablet, Rfl: 5    metoprolol tartrate (LOPRESSOR) 50 mg tablet, Take 1 tablet (50 mg total) by mouth every 12 (twelve) hours, Disp: 180 tablet, Rfl: 3    Multiple Vitamin (MULTIVITAMIN) tablet, Take 1 tablet by mouth daily IN AM, Disp: , Rfl:     omega-3-acid ethyl esters (LOVAZA) 1 g capsule, Take 2 capsules (2 g total) by mouth 2 (two) times a day, Disp: 360 capsule, Rfl: 3    Potassium Chloride ER 20 MEQ TBCR, Take 1 tablet (20 mEq total) by mouth every other day, Disp: 15 tablet, Rfl: 5    simvastatin (ZOCOR) 20 mg tablet, Take 1 tablet (20 mg total) by mouth daily at bedtime, Disp: 90 tablet, Rfl: 3    tamsulosin (FLOMAX) 0 4 mg, Take 1 capsule (0 4 mg total) by mouth daily with dinner, Disp: 90 capsule, Rfl: 3    Insulin Degludec-Liraglutide (XULTOPHY) 100 units-3 6 mg/mL injection pen, Inject 16 Units under the skin daily for 30 days, Disp: 2 pen, Rfl: 6    Past Medical History:   Diagnosis Date    Arthritis     OA    Bruise of both arms     forearms and both hands    Bruises easily     CHF (congestive heart failure) (HCC)     Diabetes mellitus (HCC)     Diabetic foot ulcer (HCC)     Eczema     Erectile dysfunction  Gout     Hyperlipidemia     Hypertension     Murmur     Nephropathy     Osteoarthritis     PAD (peripheral artery disease) (HCC)     Seasonal allergies     Toe infection     bilat great toes    Vertigo     Walks frequently     Wears dentures     upper    Wears glasses      Past Surgical History:   Procedure Laterality Date    CARDIAC PACEMAKER PLACEMENT      CARPAL TUNNEL RELEASE Left     CARPAL TUNNEL RELEASE Right     CARPAL TUNNEL RELEASE      KNEE ARTHROSCOPY Left     KNEE ARTHROSCOPY Right     KNEE SURGERY      HI AMPUTATION TOE,I-P JT Bilateral 5/2/2017    Procedure: PARTIAL AMPUTATION RIGHT AND LEFT HALLUX ;  Surgeon: Lazarus Douglas DPM;  Location: [facility];  Service: Podiatry    HI AMPUTATION TOE,MT-P JT Left 7/25/2017    Procedure: 2ND TOE AMPUTATION;  Surgeon: Lazarus Douglas DPM;  Location: [facility];  Service: Podiatry    SHOULDER ARTHROSCOPY Left     with screws,RTC    SHOULDER SURGERY      VASECTOMY       Family History   Problem Relation Age of Onset    Heart disease Father     No Known Problems Mother     Hypertension Father     Pulmonary embolism Father       reports that he has quit smoking  His smoking use included cigarettes  He has a 30 00 pack-year smoking history  He has never used smokeless tobacco  He reports that he does not drink alcohol or use drugs  Physical Exam:   Vitals:    07/29/19 1523   BP: 154/71   BP Location: Left arm   Patient Position: Sitting   Cuff Size: Standard   Pulse: 87   Weight: 90 7 kg (200 lb)   Height: 5' 10" (1 778 m)     Body mass index is 28 7 kg/m²      General: conscious, cooperative, in not acute distress  Eyes: conjunctivae pink, anicteric sclerae  ENT: lips and mucous membranes moist  Neck: supple, no JVD  Chest: clear breath sounds bilateral, no crackles, ronchus or wheezings  CVS:  Positive murmur  Abdomen: soft, non-tender, non-distended, normoactive bowel sounds  Extremities: no edema of both legs  Skin: no rash  Neuro: awake, alert, oriented    Lab Results:     Results for orders placed or performed in visit on 07/22/19   CBC and differential   Result Value Ref Range    WBC 9 40 4 31 - 10 16 Thousand/uL    RBC 4 02 3 88 - 5 62 Million/uL    Hemoglobin 11 7 (L) 12 0 - 17 0 g/dL    Hematocrit 36 0 (L) 36 5 - 49 3 %    MCV 90 82 - 98 fL    MCH 29 1 26 8 - 34 3 pg    MCHC 32 5 31 4 - 37 4 g/dL    RDW 14 9 11 6 - 15 1 %    MPV 10 2 8 9 - 12 7 fL    Platelets 920 692 - 829 Thousands/uL    nRBC 0 /100 WBCs    Neutrophils Relative 72 43 - 75 %    Immat GRANS % 0 0 - 2 %    Lymphocytes Relative 15 14 - 44 %    Monocytes Relative 7 4 - 12 %    Eosinophils Relative 5 0 - 6 %    Basophils Relative 1 0 - 1 %    Neutrophils Absolute 6 64 1 85 - 7 62 Thousands/µL    Immature Grans Absolute 0 03 0 00 - 0 20 Thousand/uL    Lymphocytes Absolute 1 45 0 60 - 4 47 Thousands/µL    Monocytes Absolute 0 69 0 17 - 1 22 Thousand/µL    Eosinophils Absolute 0 50 0 00 - 0 61 Thousand/µL    Basophils Absolute 0 09 0 00 - 0 10 Thousands/µL       Echocardiogram 8/9/15  - left ventricle had an EF of 60% wall thickness was mildly increased with mild concentric hypertrophy and an abnormal left ventricular relaxation  - left atrium was mildly to moderately dilated  - right atrium was mildly dilated  - mitral valve shows mild annular calcification    Renal artery ultrasound 6/27/19  RIGHT RENAL:  No evidence of significant arterial occlusive disease in the main renal artery  Pulsatile renal vein  Renovascular resistive index of 0 8, indicative of parenchymal disease  Renal/Aorta Ratio: 1 81  The Kidney measures 13 cm  LEFT RENAL:  There is evidence of a >60% stenosis in the ostium and mid renal artery  Pulsatile renal vein  Renovascular resistive index of 0 9, indicative of parenchymal disease  Renal/Aorta Ratio: 2 4  The Kidney measures 13 9 cm  MESENTERIC:  There is evidence of a >70% stenosis in the celiac artery    Superior mesenteric artery is patent  Portions of the record may have been created with voice recognition software  Occasional wrong word or "sound a like" substitutions may have occurred due to the inherent limitations of voice recognition software  Read the chart carefully and recognize, using context, where substitutions have occurred  If you have any questions, please contact the dictating provider

## 2019-07-30 ENCOUNTER — PATIENT OUTREACH (OUTPATIENT)
Dept: FAMILY MEDICINE CLINIC | Facility: CLINIC | Age: 64
End: 2019-07-30

## 2019-07-30 NOTE — TELEPHONE ENCOUNTER
Unfortunately we do not have samples of Xultophy in the office  If needed we can switch the type of insulin to something more affordable

## 2019-07-30 NOTE — PROGRESS NOTES
Josephine Debar is managing well at home and denies needing additional assistance  His weight has been stable and he denies chest pain, sob or LE edema  Nutritional intake adequate  He does glucometer checks twice daily and blood sugars are in the range of 130-170, denies hypoglycemic episodes  He is taking all medications as prescribed  Follow up appointments scheduled  He has my contact information for any questions or concerns

## 2019-08-01 ENCOUNTER — TELEPHONE (OUTPATIENT)
Dept: ENDOCRINOLOGY | Facility: CLINIC | Age: 64
End: 2019-08-01

## 2019-08-01 NOTE — TELEPHONE ENCOUNTER
Notify patient we received note  Other options for insulin would not be a combination of [place] and Victoza which he receives in his Xultophy  We can certainly continue Xultophy as his BG have been improving on his current regimen

## 2019-08-02 NOTE — TELEPHONE ENCOUNTER
Did he want something different? From his note it seemed if BG were stable on Xultophy he did not want to switch  Xultophy is insulin and GLP

## 2019-08-02 NOTE — TELEPHONE ENCOUNTER
Spoke with Yuliana Scott and they are going to fill out pt assistance forms to try to get Xultophy while in the donut hole    If the cost is still to much, will call and then we can decide which Insulin to Rx

## 2019-08-02 NOTE — TELEPHONE ENCOUNTER
It is working but he will be in the donut hole very shortly and it is quite expensive  He would like something else to get him through till he is out of it

## 2019-08-02 NOTE — TELEPHONE ENCOUNTER
He will need to check with insurance to find out cost  We can switch to [place], Lantus, Basaglar, or Levemir for basal insulin  If those are too expensive [facility] has a generic brand called relion (Novolin 70/30)   That comes in a vial  He would have to draw up dose with needle/syringe

## 2019-08-05 ENCOUNTER — PATIENT OUTREACH (OUTPATIENT)
Dept: FAMILY MEDICINE CLINIC | Facility: CLINIC | Age: 64
End: 2019-08-05

## 2019-08-21 ENCOUNTER — TELEPHONE (OUTPATIENT)
Dept: FAMILY MEDICINE CLINIC | Facility: CLINIC | Age: 64
End: 2019-08-21

## 2019-08-21 NOTE — TELEPHONE ENCOUNTER
Took call from [address] [employer] stating the patient would like to discontinue O2 because he feels he does not need it anymore  Patient refuses to sign an AMA form and they are unable to locate the original prescriber which was a provider in the ED when patient was admitted  Patient has been on O2 since 5/8/19  If you are willing to d/c the script should state Discontinue oxygen therapy       Best call back for Glory Salmeron [phone]      Please advise if appropriate

## 2019-08-21 NOTE — TELEPHONE ENCOUNTER
Rx written to discontinue oxygen therapy as patient was weaned off oxygen prior to hospital discharge on 05/28/2019 per discharge summary     Please fax

## 2019-08-25 PROBLEM — Z89.422 S/P AMPUTATION OF LESSER TOE, LEFT (HCC): Status: ACTIVE | Noted: 2019-08-25

## 2019-08-25 PROBLEM — Z89.421 S/P AMPUTATION OF LESSER TOE, RIGHT (HCC): Status: ACTIVE | Noted: 2019-08-25

## 2019-09-03 ENCOUNTER — OFFICE VISIT (OUTPATIENT)
Dept: FAMILY MEDICINE CLINIC | Facility: CLINIC | Age: 64
End: 2019-09-03
Payer: MEDICARE

## 2019-09-03 VITALS
HEIGHT: 70 IN | DIASTOLIC BLOOD PRESSURE: 64 MMHG | WEIGHT: 205 LBS | SYSTOLIC BLOOD PRESSURE: 136 MMHG | HEART RATE: 64 BPM | TEMPERATURE: 97.4 F | BODY MASS INDEX: 29.35 KG/M2 | RESPIRATION RATE: 16 BRPM

## 2019-09-03 DIAGNOSIS — I50.32 CHRONIC DIASTOLIC (CONGESTIVE) HEART FAILURE (HCC): ICD-10-CM

## 2019-09-03 DIAGNOSIS — G47.33 OSA (OBSTRUCTIVE SLEEP APNEA): ICD-10-CM

## 2019-09-03 DIAGNOSIS — N18.3 ACUTE RENAL FAILURE SUPERIMPOSED ON STAGE 3 CHRONIC KIDNEY DISEASE, UNSPECIFIED ACUTE RENAL FAILURE TYPE: ICD-10-CM

## 2019-09-03 DIAGNOSIS — I73.9 PERIPHERAL ARTERIAL DISEASE (HCC): ICD-10-CM

## 2019-09-03 DIAGNOSIS — N17.9 ACUTE RENAL FAILURE SUPERIMPOSED ON STAGE 3 CHRONIC KIDNEY DISEASE, UNSPECIFIED ACUTE RENAL FAILURE TYPE: ICD-10-CM

## 2019-09-03 DIAGNOSIS — I10 ESSENTIAL HYPERTENSION: ICD-10-CM

## 2019-09-03 DIAGNOSIS — I48.0 PAF (PAROXYSMAL ATRIAL FIBRILLATION) (HCC): ICD-10-CM

## 2019-09-03 DIAGNOSIS — N18.30 TYPE 2 DIABETES MELLITUS WITH STAGE 3 CHRONIC KIDNEY DISEASE, WITHOUT LONG-TERM CURRENT USE OF INSULIN (HCC): Primary | ICD-10-CM

## 2019-09-03 DIAGNOSIS — E78.2 MIXED HYPERLIPIDEMIA: ICD-10-CM

## 2019-09-03 DIAGNOSIS — E11.22 TYPE 2 DIABETES MELLITUS WITH STAGE 3 CHRONIC KIDNEY DISEASE, WITHOUT LONG-TERM CURRENT USE OF INSULIN (HCC): Primary | ICD-10-CM

## 2019-09-03 PROCEDURE — 1123F ACP DISCUSS/DSCN MKR DOCD: CPT | Performed by: FAMILY MEDICINE

## 2019-09-03 PROCEDURE — 99214 OFFICE O/P EST MOD 30 MIN: CPT | Performed by: FAMILY MEDICINE

## 2019-09-03 NOTE — PROGRESS NOTES
Assessment/Plan:  Patient to continue present treatment  Patient instructed to follow a low-fat, low-salt and a low sugar/carbohydrate diet and get regular aerobic exercise walking 150 minutes per week  Patient instructed to schedule colonoscopy and agrees to do so  Patient to follow up with specialists as scheduled and return the office in 3 months  Diagnoses and all orders for this visit:    Type 2 diabetes mellitus with stage 3 chronic kidney disease, without long-term current use of insulin (HCC)    Essential hypertension    Mixed hyperlipidemia    NICOLASA (obstructive sleep apnea)    Chronic diastolic (congestive) heart failure (HCC)    Acute renal failure superimposed on stage 3 chronic kidney disease, unspecified acute renal failure type (HCC)    PAF (paroxysmal atrial fibrillation) ([facility] [address] )    Peripheral arterial disease ([facility] [address] )    Other orders  -     econazole nitrate 1 % cream; Apply 1 application topically 2 (two) times a day To affected area          Subjective:      Patient ID: Fabian Hunter is a 75 y o  male  Patient is here for follow-up appointment for chronic conditions and he is not fasting today  Recent labs reviewed and patient has more labs ordered by specialists  Patient has been feeling fairly well overall  Patient has been walking almost daily  Home glucose monitoring reveals fasting blood sugars around 140 and 9:00 p m  170-180  Patient has a follow-up appoint with Endocrinology on 09/26/2019, follow-up appoint with Cardiology in December of this year and recently saw his nephrologist and has a 6 month follow-up appointment scheduled  He has not scheduled colonoscopy yet but plans to  Patient uses CPAP nightly  Diabetes   He presents for his follow-up diabetic visit  He has type 2 diabetes mellitus  His disease course has been improving  There are no hypoglycemic associated symptoms  Pertinent negatives for hypoglycemia include no headaches   Associated symptoms include weight loss  Pertinent negatives for diabetes include no blurred vision, no chest pain, no fatigue, no foot paresthesias, no foot ulcerations, no polydipsia, no polyuria, no visual change and no weakness  There are no hypoglycemic complications  Symptoms are improving  Diabetic complications include heart disease and nephropathy  Risk factors for coronary artery disease include dyslipidemia, diabetes mellitus, hypertension, male sex, obesity and tobacco exposure  Current diabetic treatment includes oral agent (monotherapy) and insulin injections  He is compliant with treatment all of the time  His weight is decreasing steadily  He is following a generally healthy diet  He participates in exercise daily  His home blood glucose trend is increasing steadily  His breakfast blood glucose is taken between 6-7 am  His breakfast blood glucose range is generally 140-180 mg/dl  His bedtime blood glucose is taken between 8-9 pm  His bedtime blood glucose range is generally 140-180 mg/dl  An ACE inhibitor/angiotensin II receptor blocker is being taken  He sees a podiatrist Eye exam is current  Hypertension   This is a chronic problem  The problem is controlled  Pertinent negatives include no anxiety, blurred vision, chest pain, headaches, orthopnea, palpitations, peripheral edema, PND or shortness of breath  Past treatments include beta blockers, angiotensin blockers and calcium channel blockers  The current treatment provides significant improvement  There are no compliance problems  Hypertensive end-organ damage includes kidney disease, CAD/MI and heart failure  Hyperlipidemia   This is a chronic problem  The problem is controlled  Recent lipid tests were reviewed and are normal  Exacerbating diseases include diabetes and obesity  He has no history of hypothyroidism  Pertinent negatives include no chest pain, focal sensory loss, focal weakness, leg pain, myalgias or shortness of breath   Current antihyperlipidemic treatment includes statins  The current treatment provides significant improvement of lipids  There are no compliance problems  The following portions of the patient's history were reviewed and updated as appropriate: allergies, current medications, past family history, past medical history, past social history, past surgical history and problem list     Review of Systems   Constitutional: Positive for weight loss  Negative for fatigue  Eyes: Negative for blurred vision  Respiratory: Negative for shortness of breath  Cardiovascular: Negative for chest pain, palpitations, orthopnea and PND  Endocrine: Negative for polydipsia and polyuria  Musculoskeletal: Negative for myalgias  Neurological: Negative for focal weakness, weakness and headaches  Objective:      /64   Pulse 64   Temp (!) 97 4 °F (36 3 °C) (Tympanic)   Resp 16   Ht 5' 10" (1 778 m)   Wt 93 kg (205 lb)   BMI 29 41 kg/m²          Physical Exam   Constitutional: He is oriented to person, place, and time  He appears well-developed and well-nourished  HENT:   Head: Normocephalic  Right Ear: External ear normal    Left Ear: External ear normal    Nose: Nose normal    Mouth/Throat: Oropharynx is clear and moist    Eyes: Conjunctivae are normal  No scleral icterus  Neck: Neck supple  No thyromegaly present  Cardiovascular: Normal rate and regular rhythm  Pulmonary/Chest: Effort normal and breath sounds normal    Abdominal: Soft  There is no tenderness  Musculoskeletal: He exhibits no edema  Lymphadenopathy:     He has no cervical adenopathy  Neurological: He is alert and oriented to person, place, and time  Skin: Skin is warm and dry  Psychiatric: He has a normal mood and affect  BMI Counseling: Body mass index is 29 41 kg/m²  Discussed the patient's BMI with him  The BMI is above average  BMI counseling and education was provided to the patient   Nutrition recommendations include decreasing overall calorie intake, reducing fast food intake, consuming healthier snacks, moderation in carbohydrate intake and reducing intake of saturated fat and trans fat  Exercise recommendations include moderate aerobic physical activity for 150 minutes/week

## 2019-09-04 ENCOUNTER — REMOTE DEVICE CLINIC VISIT (OUTPATIENT)
Dept: CARDIOLOGY CLINIC | Facility: CLINIC | Age: 64
End: 2019-09-04
Payer: MEDICARE

## 2019-09-04 DIAGNOSIS — Z95.0 CARDIAC PACEMAKER IN SITU: Primary | ICD-10-CM

## 2019-09-04 PROCEDURE — 93294 REM INTERROG EVL PM/LDLS PM: CPT | Performed by: INTERNAL MEDICINE

## 2019-09-04 PROCEDURE — 93296 REM INTERROG EVL PM/IDS: CPT | Performed by: INTERNAL MEDICINE

## 2019-09-04 NOTE — PROGRESS NOTES
Results for orders placed or performed in visit on 09/04/19   Cardiac EP device report    Narrative    MDT-DUAL CHAMBER PPM (AAIR-DDDR MODE)  CARELINK TRANSMISSION: BATTERY STATUS "OK"  AP 47%  100%  ALL AVAILABLE LEAD PARAMETERS WITHIN NORMAL LIMITS  NO SIGNIFICANT HIGH RATE EPISODES  NORMAL DEVICE FUNCTION   NC

## 2019-09-05 ENCOUNTER — TELEPHONE (OUTPATIENT)
Dept: ENDOCRINOLOGY | Facility: CLINIC | Age: 64
End: 2019-09-05

## 2019-09-19 ENCOUNTER — APPOINTMENT (OUTPATIENT)
Dept: LAB | Facility: CLINIC | Age: 64
End: 2019-09-19
Payer: MEDICARE

## 2019-09-19 DIAGNOSIS — E78.2 MIXED HYPERLIPIDEMIA: ICD-10-CM

## 2019-09-19 DIAGNOSIS — E11.22 TYPE 2 DIABETES MELLITUS WITH STAGE 3 CHRONIC KIDNEY DISEASE, WITHOUT LONG-TERM CURRENT USE OF INSULIN (HCC): ICD-10-CM

## 2019-09-19 DIAGNOSIS — N18.30 TYPE 2 DIABETES MELLITUS WITH STAGE 3 CHRONIC KIDNEY DISEASE, WITHOUT LONG-TERM CURRENT USE OF INSULIN (HCC): ICD-10-CM

## 2019-09-19 DIAGNOSIS — I10 ESSENTIAL HYPERTENSION: ICD-10-CM

## 2019-09-19 LAB
ALBUMIN SERPL BCP-MCNC: 3.6 G/DL (ref 3.5–5)
ALP SERPL-CCNC: 85 U/L (ref 46–116)
ALT SERPL W P-5'-P-CCNC: 48 U/L (ref 12–78)
ANION GAP SERPL CALCULATED.3IONS-SCNC: 5 MMOL/L (ref 4–13)
AST SERPL W P-5'-P-CCNC: 37 U/L (ref 5–45)
BILIRUB SERPL-MCNC: 0.83 MG/DL (ref 0.2–1)
BUN SERPL-MCNC: 48 MG/DL (ref 5–25)
CALCIUM SERPL-MCNC: 9.3 MG/DL (ref 8.3–10.1)
CHLORIDE SERPL-SCNC: 103 MMOL/L (ref 100–108)
CHOLEST SERPL-MCNC: 109 MG/DL (ref 50–200)
CO2 SERPL-SCNC: 26 MMOL/L (ref 21–32)
CREAT SERPL-MCNC: 1.6 MG/DL (ref 0.6–1.3)
EST. AVERAGE GLUCOSE BLD GHB EST-MCNC: 128 MG/DL
GFR SERPL CREATININE-BSD FRML MDRD: 44 ML/MIN/1.73SQ M
GLUCOSE P FAST SERPL-MCNC: 120 MG/DL (ref 65–99)
HBA1C MFR BLD: 6.1 % (ref 4.2–6.3)
HDLC SERPL-MCNC: 36 MG/DL (ref 40–60)
LDLC SERPL CALC-MCNC: 54 MG/DL (ref 0–100)
POTASSIUM SERPL-SCNC: 4.8 MMOL/L (ref 3.5–5.3)
PROT SERPL-MCNC: 7.5 G/DL (ref 6.4–8.2)
SODIUM SERPL-SCNC: 134 MMOL/L (ref 136–145)
T4 FREE SERPL-MCNC: 0.88 NG/DL (ref 0.76–1.46)
TRIGL SERPL-MCNC: 96 MG/DL
TSH SERPL DL<=0.05 MIU/L-ACNC: 3.43 UIU/ML (ref 0.36–3.74)

## 2019-09-19 PROCEDURE — 36415 COLL VENOUS BLD VENIPUNCTURE: CPT

## 2019-09-19 PROCEDURE — 83036 HEMOGLOBIN GLYCOSYLATED A1C: CPT

## 2019-09-19 PROCEDURE — 80061 LIPID PANEL: CPT

## 2019-09-19 PROCEDURE — 84439 ASSAY OF FREE THYROXINE: CPT

## 2019-09-19 PROCEDURE — 84443 ASSAY THYROID STIM HORMONE: CPT

## 2019-09-19 PROCEDURE — 80053 COMPREHEN METABOLIC PANEL: CPT

## 2019-09-24 NOTE — PROGRESS NOTES
Established Patient Progress Note      Chief Complaint   Patient presents with    Diabetes Type 2          History of Present Illness:   Eladia Lassiter is a 75 y o  male with a history of HTN, HLD type 2 diabetes without long term use of insulin  Reports complications of neuropathy and CKD  Last A1C 6 1  He did not bring in BG log or meter to today's  He reports his BG have been slightly higher on his meter  Has never calibrated meter  Denies recent illness or hospitalizations  Denies recent severe hypoglycemic or severe hyperglycemic episodes  Denies any issues with his current regimen  Home glucose monitoring: are performed regularly    Home blood glucose readings:   Before breakfast: 140-160s  Before lunch: 120-160s  Before dinner: does not check   Bedtime: 170-190s    Current regimen: Xultophy 18 units and Metformin 1,000 mg BID  compliant all of the timedenies any side effects from current medications     Hypoglycemic episodes: No never   H/o of hypoglycemia causing hospitalization or Intervention such as glucagon injection or ambulance call No     Last Eye Exam: 03/12/19  Last Foot Exam: every 2 weeks, closed blister on left foot      Has hypertension: Taking Amlodipine, Losartan, and Metoprolol   Has hyperlipidemia: Taking Simvastatin and Lovaza      Patient Active Problem List   Diagnosis    Bilateral leg edema    Diabetes mellitus with neuropathy [facility] [address] [facility]    Diabetic foot ulcer ([facility] [address] )    DM type 2, not at goal [facility])    Easy bruising    Eczema    Erectile dysfunction of non-organic origin    Gout    Hyperlipidemia    Nephropathy    Osteoarthritis    Peripheral arterial disease ([facility] [address] [facility]    PVCs (premature ventricular contractions)    Rhinitis    Systolic murmur    Vertigo    Complete heart block (HCC)    Metabolic acidosis    Idiopathic hypotension    Other hyperlipidemia    Sepsis (HCC)    PAF (paroxysmal atrial fibrillation) (HCC)    Acute respiratory failure with hypoxia (HCC)    Persistent proteinuria    Volume overload    Urinary retention    Third degree heart block (HCC)    NICOLASA (obstructive sleep apnea)    Acute diastolic [facility]congestive) heart failure ([facility])    Type 2 diabetes mellitus with chronic kidney disease, without long-term current use of insulin ([facility])    Essential hypertension    Acute renal failure superimposed on stage 3 chronic kidney disease ([facility])    Nonrheumatic aortic valve stenosis    Hypoglycemia    Anemia    Leukocytosis    Fever    Mitral valve stenosis    Abnormal CT of the chest    Acute pulmonary edema (HCC)    Chronic diastolic (congestive) heart failure ([facility])    Left renal artery stenosis ([facility])    S/P amputation of lesser toe, left ([facility])    S/P amputation of lesser toe, right ([facility])      Past Medical History:   Diagnosis Date    Arthritis     OA    Bruise of both arms     forearms and both hands    Bruises easily     [facility]congestive [facility] failure) [facility] [address] [facility]     Diabetes mellitus ([facility] [address] [facility]     Diabetic foot ulcer ([facility] [address] [facility]     Eczema     Erectile dysfunction     Gout     Hyperlipidemia     Hypertension     Murmur     Nephropathy     Osteoarthritis     PAD (peripheral artery disease) ([facility])     Seasonal allergies     Toe infection     bilat great toes    Vertigo     Walks frequently     Wears dentures     upper    Wears glasses       Past Surgical History:   Procedure Laterality Date    CARDIAC PACEMAKER PLACEMENT      CARPAL TUNNEL RELEASE Left     CARPAL TUNNEL RELEASE Right     CARPAL TUNNEL RELEASE      KNEE ARTHROSCOPY Left     KNEE ARTHROSCOPY Right     KNEE SURGERY      AK AMPUTATION TOE,I-P JT Bilateral 5/2/2017    Procedure: PARTIAL AMPUTATION RIGHT AND LEFT HALLUX ;  Surgeon: Erica Gruber DPM;  Location: [facility];  Service: Podiatry    AK AMPUTATION TOE,MT-P JT Left 7/25/2017    Procedure: 2ND TOE AMPUTATION;  Surgeon: Erica Gruber DPM;  Location: [facility];  Service: Podiatry    SHOULDER ARTHROSCOPY Left     with screws,RTC    SHOULDER SURGERY      VASECTOMY        Family History   Problem Relation Age of Onset    Heart disease Father     No Known Problems Mother     Hypertension Father     Pulmonary embolism Father      Social History     Tobacco Use    Smoking status: Former Smoker     Packs/day: 1 00     Years: 30 00     Pack years: 30 00     Types: Cigarettes    Smokeless tobacco: Never Used    Tobacco comment: quit 10 years ago   Substance Use Topics    Alcohol use: Never     Frequency: Never     Allergies   Allergen Reactions    Invokana [Canagliflozin]     Lamisil [Terbinafine] Rash    Lamisil [Terbinafine] Blisters     Wife states " His skin peeled from head to toe"    Other Swelling     Pomegranate - facial swelling, no swelling of tongue, esophagus  Adhesive tape        Latex Rash         Current Outpatient Medications:     allopurinol (ZYLOPRIM) 300 mg tablet, TAKE 1 TABLET (300 MG TOTAL) BY MOUTH EVERY MORNING, Disp: 90 tablet, Rfl: 2    amLODIPine (NORVASC) 10 mg tablet, Take 1 tablet (10 mg total) by mouth daily, Disp: 90 tablet, Rfl: 3    apixaban (ELIQUIS) 5 mg, Take 1 tablet (5 mg total) by mouth every 12 (twelve) hours, Disp: 60 tablet, Rfl: 11    Cholecalciferol (VITAMIN D-3) 1000 units CAPS, Take 1 capsule by mouth every morning  , Disp: , Rfl:     Dupilumab, Asthma, (DUPIXENT SC), Inject under the skin, Disp: , Rfl:     econazole nitrate 1 % cream, Apply 1 application topically 2 (two) times a day To affected area, Disp: , Rfl: 0    furosemide (LASIX) 40 mg tablet, Take 1 tablet (40 mg total) by mouth 2 (two) times a day Take two tablets every morning and one tablet at 2pm, Disp: , Rfl:     glucagon (GLUCAGON EMERGENCY) 1 MG injection, Inject 1 mg under the skin once as needed for low blood sugar for up to 1 dose, Disp: 1 kit, Rfl: 2    glucose blood (ONE TOUCH ULTRA TEST) test strip, 1 each by Other route 3 (three) times a day Use to test blood sugar, Disp: 130 each, Rfl: 6    hydrOXYzine HCL (ATARAX) 10 mg tablet, Take 10 mg by mouth every 6 (six) hours as needed  , Disp: , Rfl:     losartan (COZAAR) 50 mg tablet, Take 0 5 tablets (25 mg total) by mouth daily, Disp: 180 tablet, Rfl: 3    metFORMIN (GLUCOPHAGE) 500 mg tablet, Take 1 tablet (500 mg total) by mouth 2 (two) times a day with meals [id number] /twice a day, Disp: 360 tablet, Rfl: 3    metolazone (ZAROXOLYN) 5 mg tablet, Take 1 tablet (5 mg total) by mouth see administration instructions One tablet every 6 days prn and as directed by physician, Disp: 5 tablet, Rfl: 5    metoprolol tartrate (LOPRESSOR) 50 mg tablet, Take 1 tablet (50 mg total) by mouth every 12 (twelve) hours, Disp: 180 tablet, Rfl: 3    Multiple Vitamin (MULTIVITAMIN) tablet, Take 1 tablet by mouth daily IN AM, Disp: , Rfl:     omega-3-acid ethyl esters (LOVAZA) 1 g capsule, Take 2 capsules (2 g total) by mouth 2 (two) times a day, Disp: 360 capsule, Rfl: 3    Potassium Chloride ER 20 MEQ TBCR, Take 1 tablet (20 mEq total) by mouth every other day, Disp: 15 tablet, Rfl: 5    simvastatin (ZOCOR) 20 mg tablet, Take 1 tablet (20 mg total) by mouth daily at bedtime, Disp: 90 tablet, Rfl: 3    betamethasone valerate (VALISONE) 0 1 % cream, APPLY TO BACK,ARMS,CHEST,LEGS AND ABDOMEN TWICE A DAY, Disp: , Rfl: 3    Insulin Degludec-Liraglutide (XULTOPHY) 100 units-3 6 mg/mL injection pen, Inject 16 Units under the skin daily for 30 days (Patient taking differently: Inject 18 Units under the skin daily ), Disp: 2 pen, Rfl: 6    Review of Systems   Constitutional: Negative for activity change, appetite change and fatigue  HENT: Negative for sore throat, trouble swallowing and voice change  Eyes: Negative for visual disturbance  Respiratory: Negative for choking, chest tightness and shortness of breath  Cardiovascular: Negative for chest pain, palpitations and leg swelling  Gastrointestinal: Negative for abdominal pain, constipation and diarrhea     Endocrine: Negative for cold intolerance, heat intolerance, polydipsia, polyphagia and polyuria  Genitourinary: Negative for frequency  Musculoskeletal: Negative for arthralgias and myalgias  Skin: Negative for rash  Neurological: Negative for dizziness and syncope  Hematological: Negative for adenopathy  Psychiatric/Behavioral: Negative for sleep disturbance  All other systems reviewed and are negative  Physical Exam:  Body mass index is 29 93 kg/m²  /74   Pulse 90   Ht 5' 10" (1 778 m)   Wt 94 6 kg (208 lb 9 6 oz)   BMI 29 93 kg/m²    Wt Readings from Last 3 Encounters:   09/26/19 94 6 kg (208 lb 9 6 oz)   09/03/19 93 kg (205 lb)   07/29/19 90 7 kg (200 lb)       Physical Exam   Constitutional: He is oriented to person, place, and time  He appears well-developed and well-nourished  No distress  HENT:   Head: Normocephalic and atraumatic  Mouth/Throat: Oropharynx is clear and moist    Eyes: Pupils are equal, round, and reactive to light  Conjunctivae and EOM are normal    Neck: Normal range of motion  Neck supple  No thyromegaly present  Cardiovascular: Normal rate, regular rhythm and normal heart sounds  No murmur heard  Pulmonary/Chest: Effort normal and breath sounds normal  No respiratory distress  He has no wheezes  He has no rales  Abdominal: Soft  Bowel sounds are normal  He exhibits no distension  There is no tenderness  Musculoskeletal: Normal range of motion  He exhibits no edema  Lymphadenopathy:     He has no cervical adenopathy  Neurological: He is alert and oriented to person, place, and time  Skin: Skin is warm and dry  Psychiatric: He has a normal mood and affect  Vitals reviewed          Labs:     Lab Results   Component Value Date    HGBA1C 6 1 09/19/2019       Lab Results   Component Value Date     (L) 04/10/2017    SODIUM 134 (L) 09/19/2019    K 4 8 09/19/2019     09/19/2019    CO2 26 09/19/2019    AGAP 5 09/19/2019    BUN 48 (H) 09/19/2019 CREATININE 1 60 (H) 09/19/2019    GLUC 107 05/23/2019    GLUF 120 (H) 09/19/2019    CALCIUM 9 3 09/19/2019    AST 37 09/19/2019    ALT 48 09/19/2019    ALKPHOS 85 09/19/2019    PROT 6 9 04/10/2017    TP 7 5 09/19/2019    BILITOT 0 7 04/10/2017    TBILI 0 83 09/19/2019    EGFR 44 09/19/2019         Lab Results   Component Value Date    CHOL 161 12/12/2015    HDL 36 (L) 09/19/2019    TRIG 96 09/19/2019       Lab Results   Component Value Date    UQY3IDZCLHLU 3 430 09/19/2019    ZQH5MHYIXSPH 4 170 (H) 06/12/2019    PXF9QTQSMGXL 2 140 03/07/2019     Lab Results   Component Value Date    FREET4 0 88 09/19/2019       Impression & Plan:    Problem List Items Addressed This Visit        Endocrine    Type 2 diabetes mellitus with chronic kidney disease, without long-term current use of insulin ([facility] [address] ) - Primary     At goal, however patient reports BG readings have been higher on meter  Meter may be old or need to be calibrated  Fasting glucose on  but stated he checked on meter before hand and was 160  Continue current regimen for now  Focus on dietary and lifestyle modifications  Relevant Orders    Hemoglobin A1C    Lipid Panel with Direct LDL reflex    Comprehensive metabolic panel       Cardiovascular and Mediastinum    Essential hypertension     BP slightly elevated today  Usually well controlled  Continue current regimen for now  Reassess at next visit  Relevant Orders    Comprehensive metabolic panel       Other    Hyperlipidemia     Stable, continue current regimen          Relevant Orders    Lipid Panel with Direct LDL reflex          Orders Placed This Encounter   Procedures    Mychart glucose flowsheet     Order Specific Question:   After how many days would you like to receive a notification of this patient's flowsheet entries?      Answer:   14    Hemoglobin A1C     Standing Status:   Future     Standing Expiration Date:   9/26/2020    Lipid Panel with Direct LDL reflex     This is a patient instruction: This test requires patient fasting for 10-12 hours or longer  Drinking of black coffee or black tea is acceptable  Standing Status:   Future     Standing Expiration Date:   9/26/2020    Comprehensive metabolic panel     This is a patient instruction: Patient fasting for 8 hours or longer recommended  Standing Status:   Future     Standing Expiration Date:   9/26/2020         Discussed with the patient and all questioned fully answered  He will call me if any problems arise  Follow-up appointment in 3 months       Counseled patient on diagnostic results, prognosis, risk and benefit of treatment options, instruction for management, importance of treatment compliance, Risk  factor reduction and impressions      Chloe [address]uca [address]

## 2019-09-25 DIAGNOSIS — I48.0 PAROXYSMAL ATRIAL FIBRILLATION (HCC): ICD-10-CM

## 2019-09-26 ENCOUNTER — OFFICE VISIT (OUTPATIENT)
Dept: ENDOCRINOLOGY | Facility: CLINIC | Age: 64
End: 2019-09-26
Payer: MEDICARE

## 2019-09-26 VITALS
HEART RATE: 90 BPM | HEIGHT: 70 IN | DIASTOLIC BLOOD PRESSURE: 74 MMHG | WEIGHT: 208.6 LBS | SYSTOLIC BLOOD PRESSURE: 158 MMHG | BODY MASS INDEX: 29.86 KG/M2

## 2019-09-26 DIAGNOSIS — E11.22 TYPE 2 DIABETES MELLITUS WITH STAGE 3 CHRONIC KIDNEY DISEASE, WITHOUT LONG-TERM CURRENT USE OF INSULIN (HCC): Primary | ICD-10-CM

## 2019-09-26 DIAGNOSIS — E78.2 MIXED HYPERLIPIDEMIA: ICD-10-CM

## 2019-09-26 DIAGNOSIS — N18.30 TYPE 2 DIABETES MELLITUS WITH STAGE 3 CHRONIC KIDNEY DISEASE, WITHOUT LONG-TERM CURRENT USE OF INSULIN (HCC): Primary | ICD-10-CM

## 2019-09-26 DIAGNOSIS — I10 ESSENTIAL HYPERTENSION: ICD-10-CM

## 2019-09-26 PROCEDURE — 99214 OFFICE O/P EST MOD 30 MIN: CPT | Performed by: NURSE PRACTITIONER

## 2019-09-26 NOTE — ASSESSMENT & PLAN NOTE
At goal, however patient reports BG readings have been higher on meter  Meter may be old or need to be calibrated  Fasting glucose on  but stated he checked on meter before hand and was 160  Continue current regimen for now  Focus on dietary and lifestyle modifications

## 2019-09-26 NOTE — ASSESSMENT & PLAN NOTE
BP slightly elevated today  Usually well controlled  Continue current regimen for now  Reassess at next visit

## 2019-10-31 DIAGNOSIS — E11.9 DM TYPE 2, NOT AT GOAL (HCC): Primary | ICD-10-CM

## 2019-11-12 ENCOUNTER — TELEPHONE (OUTPATIENT)
Dept: NEPHROLOGY | Facility: CLINIC | Age: 64
End: 2019-11-12

## 2019-11-12 NOTE — TELEPHONE ENCOUNTER
Left message for patient to call the office and schedule FU appointment with Dr Jaz Yoon for the [facility] in February

## 2019-11-15 ENCOUNTER — CLINICAL SUPPORT (OUTPATIENT)
Dept: FAMILY MEDICINE CLINIC | Facility: CLINIC | Age: 64
End: 2019-11-15
Payer: MEDICARE

## 2019-11-15 DIAGNOSIS — Z23 NEED FOR INFLUENZA VACCINATION: Primary | ICD-10-CM

## 2019-11-15 PROCEDURE — 90686 IIV4 VACC NO PRSV 0.5 ML IM: CPT

## 2019-11-15 PROCEDURE — G0008 ADMIN INFLUENZA VIRUS VAC: HCPCS

## 2019-11-15 NOTE — PROGRESS NOTES
Patient requested regular Influenza Quadrivalent vaccine  Per verbal from Dr Jaye garner to administer this dose as this is what pt requested

## 2019-11-27 DIAGNOSIS — I50.31 ACUTE DIASTOLIC (CONGESTIVE) HEART FAILURE (HCC): ICD-10-CM

## 2019-11-27 RX ORDER — FUROSEMIDE 40 MG/1
40 TABLET ORAL 2 TIMES DAILY
Start: 2019-11-27 | End: 2020-03-12

## 2019-11-29 RX ORDER — TAMSULOSIN HYDROCHLORIDE 0.4 MG/1
0.4 CAPSULE ORAL
Refills: 3 | COMMUNITY
Start: 2019-09-25 | End: 2020-07-06 | Stop reason: SDUPTHER

## 2019-12-02 PROBLEM — I50.31 ACUTE DIASTOLIC (CONGESTIVE) HEART FAILURE (HCC): Status: RESOLVED | Noted: 2018-11-12 | Resolved: 2019-12-02

## 2019-12-02 PROBLEM — I95.0 IDIOPATHIC HYPOTENSION: Status: RESOLVED | Noted: 2018-11-01 | Resolved: 2019-12-02

## 2019-12-03 ENCOUNTER — OFFICE VISIT (OUTPATIENT)
Dept: CARDIOLOGY CLINIC | Facility: CLINIC | Age: 64
End: 2019-12-03
Payer: MEDICARE

## 2019-12-03 VITALS
HEIGHT: 70 IN | WEIGHT: 215 LBS | DIASTOLIC BLOOD PRESSURE: 58 MMHG | HEART RATE: 88 BPM | BODY MASS INDEX: 30.78 KG/M2 | SYSTOLIC BLOOD PRESSURE: 128 MMHG

## 2019-12-03 DIAGNOSIS — I50.32 CHRONIC DIASTOLIC (CONGESTIVE) HEART FAILURE (HCC): Primary | ICD-10-CM

## 2019-12-03 DIAGNOSIS — I10 ESSENTIAL HYPERTENSION: ICD-10-CM

## 2019-12-03 DIAGNOSIS — I44.2 COMPLETE HEART BLOCK (HCC): ICD-10-CM

## 2019-12-03 DIAGNOSIS — E78.49 OTHER HYPERLIPIDEMIA: ICD-10-CM

## 2019-12-03 DIAGNOSIS — I35.0 NONRHEUMATIC AORTIC VALVE STENOSIS: ICD-10-CM

## 2019-12-03 DIAGNOSIS — I48.0 PAF (PAROXYSMAL ATRIAL FIBRILLATION) (HCC): ICD-10-CM

## 2019-12-03 DIAGNOSIS — I34.2 NONRHEUMATIC MITRAL VALVE STENOSIS: ICD-10-CM

## 2019-12-03 PROCEDURE — 99214 OFFICE O/P EST MOD 30 MIN: CPT | Performed by: INTERNAL MEDICINE

## 2019-12-03 NOTE — PROGRESS NOTES
Cardiology Follow Up    Meme Kim  1955  [id number]  Sudhakar Sam  [address]  [facility] [id number] [phone]    Reason for visit; 4 month follow-up for chronic diastolic heart failure, moderate mitral stenosis, mild aortic stenosis, complete heart block status post permanent pacemaker in December of 2018, hypertension and hyperlipidemia  He had atrial fibrillation during an acute illness last year  1  Chronic diastolic (congestive) heart failure ([address] )     2  Nonrheumatic mitral valve stenosis     3  Complete heart block ([address] )     4  Essential hypertension     5  Nonrheumatic aortic valve stenosis     6  Other hyperlipidemia     7  Peripheral arterial disease ([facility] )         Interval History:   Since the patient's last visit, he has gained a fair amount of weight  He has had no change in the circumference of his legs which is wife measures  He denies any dyspnea  He denies chest pain  He does note a little peripheral edema  This is unchanged  He denies palpitations or dizziness        Patient Active Problem List   Diagnosis    Bilateral leg edema    Diabetes mellitus with neuropathy ([address] )    Diabetic foot ulcer ([address] )    DM type 2, not at goal [facility])    Easy bruising    Eczema    Erectile dysfunction of non-organic origin    Gout    Hyperlipidemia    Nephropathy    Osteoarthritis    Peripheral arterial disease (HCC)    PVCs (premature ventricular contractions)    Rhinitis    Systolic murmur    Vertigo    Complete heart block (HCC)    Metabolic acidosis    Other hyperlipidemia    Sepsis (HCC)    PAF (paroxysmal atrial fibrillation) (HCC)    Acute respiratory failure with hypoxia (HCC)    Persistent proteinuria    Volume overload    Urinary retention    NICOLASA (obstructive sleep apnea)    Type 2 diabetes mellitus with chronic kidney disease, without long-term current use of insulin ([address] )    Essential hypertension    Acute renal failure superimposed on stage 3 chronic kidney disease (HCC)    Nonrheumatic aortic valve stenosis    Hypoglycemia    Anemia    Leukocytosis    Fever    Mitral valve stenosis    Abnormal CT of the chest    Acute pulmonary edema ([facility])    Chronic diastolic (congestive) heart failure ([facility])    Left renal artery stenosis ([facility])    S/P amputation of lesser toe, left ([facility])    S/P amputation of lesser toe, right ([facility])     Past Medical History:   Diagnosis Date    Arthritis     OA    Bruise of both arms     forearms and both hands    Bruises easily     CHF (congestive heart failure) ([facility] )     Diabetes mellitus ([facility] )     Diabetic foot ulcer ([facility] )     Eczema     Erectile dysfunction     Gout     Hyperlipidemia     Hypertension     Murmur     Nephropathy     Osteoarthritis     PAD (peripheral artery disease) ([facility])     Seasonal allergies     Toe infection     bilat great toes    Vertigo     Walks frequently     Wears dentures     upper    Wears glasses      Social History     Socioeconomic History    Marital status: /Civil Union     Spouse name: Not on file    Number of children: Not on file    Years of education: Not on file    Highest education level: Not on file   Occupational History    Not on file   Social Needs    Financial resource strain: Not on file    Food insecurity:     Worry: Not on file     Inability: Not on file    Transportation needs:     Medical: Not on file     Non-medical: Not on file   Tobacco Use    Smoking status: Former Smoker     Packs/day: 1 00     Years: 30 00     Pack years: 30 00     Types: Cigarettes    Smokeless tobacco: Never Used    Tobacco comment: quit 10 years ago   Substance and Sexual Activity    Alcohol use: Never     Frequency: Never    Drug use: Never    Sexual activity: Not on file   Lifestyle    Physical activity:     Days per week: Not on file     Minutes per session: Not on file    Stress: Not on file   Relationships    Social connections:     Talks on phone: Not on file     Gets together: Not on file     Attends Jew service: Not on file     Active member of club or organization: Not on file     Attends meetings of clubs or organizations: Not on file     Relationship status: Not on file    Intimate partner violence:     Fear of current or ex partner: Not on file     Emotionally abused: Not on file     Physically abused: Not on file     Forced sexual activity: Not on file   Other Topics Concern    Not on file   Social History Narrative    ** Merged History Encounter **         Daily cola consumption (2 cans/day)      Family History   Problem Relation Age of Onset    Heart disease Father     No Known Problems Mother     Hypertension Father     Pulmonary embolism Father      Past Surgical History:   Procedure Laterality Date    CARDIAC PACEMAKER PLACEMENT      CARPAL TUNNEL RELEASE Left     CARPAL TUNNEL RELEASE Right     CARPAL TUNNEL RELEASE      KNEE ARTHROSCOPY Left     KNEE ARTHROSCOPY Right     KNEE SURGERY      NV AMPUTATION TOE,I-P JT Bilateral 5/2/2017    Procedure: PARTIAL AMPUTATION RIGHT AND LEFT HALLUX ;  Surgeon: Mukesh Javed DPM;  Location: [facility];  Service: Podiatry    NV AMPUTATION TOE,MT-P JT Left 7/25/2017    Procedure: 2ND TOE AMPUTATION;  Surgeon: Mukesh Javed DPM;  Location: [facility];  Service: Podiatry    SHOULDER ARTHROSCOPY Left     with screws,RTC    SHOULDER SURGERY      VASECTOMY         Current Outpatient Medications:     allopurinol (ZYLOPRIM) 300 mg tablet, TAKE 1 TABLET (300 MG TOTAL) BY MOUTH EVERY MORNING, Disp: 90 tablet, Rfl: 2    amLODIPine (NORVASC) 10 mg tablet, Take 1 tablet (10 mg total) by mouth daily, Disp: 90 tablet, Rfl: 3    apixaban (ELIQUIS) 5 mg, Take 1 tablet (5 mg total) by mouth every 12 (twelve) hours, Disp: 60 tablet, Rfl: 11    Cholecalciferol (VITAMIN D-3) 1000 units CAPS, Take 1 capsule by mouth every morning  , Disp: , Rfl:     Dupilumab, Asthma, (DUPIXENT SC), Inject under the skin, Disp: , Rfl:     furosemide (LASIX) 40 mg tablet, Take 1 tablet (40 mg total) by mouth 2 (two) times a day, Disp: , Rfl:     Insulin Degludec-Liraglutide (XULTOPHY) 100 units-3 6 mg/mL injection pen, Inject 16 Units under the skin daily for 30 days (Patient taking differently: Inject 18 Units under the skin daily ), Disp: 2 pen, Rfl: 6    losartan (COZAAR) 50 mg tablet, Take 0 5 tablets (25 mg total) by mouth daily, Disp: 180 tablet, Rfl: 3    metFORMIN (GLUCOPHAGE) 500 mg tablet, Take 1 tablet (500 mg total) by mouth 2 (two) times a day with meals 0lbku=4750el /twice a day, Disp: 360 tablet, Rfl: 3    metolazone (ZAROXOLYN) 5 mg tablet, Take 1 tablet (5 mg total) by mouth see administration instructions One tablet every 6 days prn and as directed by physician, Disp: 5 tablet, Rfl: 5    metoprolol tartrate (LOPRESSOR) 50 mg tablet, Take 1 tablet (50 mg total) by mouth every 12 (twelve) hours, Disp: 180 tablet, Rfl: 3    Multiple Vitamin (MULTIVITAMIN) tablet, Take 1 tablet by mouth daily IN AM, Disp: , Rfl:     omega-3-acid ethyl esters (LOVAZA) 1 g capsule, Take 2 capsules (2 g total) by mouth 2 (two) times a day, Disp: 360 capsule, Rfl: 3    Potassium Chloride ER 20 MEQ TBCR, Take 1 tablet (20 mEq total) by mouth every other day, Disp: 15 tablet, Rfl: 5    simvastatin (ZOCOR) 20 mg tablet, Take 1 tablet (20 mg total) by mouth daily at bedtime, Disp: 90 tablet, Rfl: 3    tamsulosin (FLOMAX) 0 4 mg, Take 0 4 mg by mouth daily with dinner, Disp: , Rfl: 3    betamethasone valerate (VALISONE) 0 1 % cream, APPLY TO BACK,ARMS,CHEST,LEGS AND ABDOMEN TWICE A DAY, Disp: , Rfl: 3    econazole nitrate 1 % cream, Apply 1 application topically 2 (two) times a day To affected area, Disp: , Rfl: 0    glucagon (GLUCAGON EMERGENCY) 1 MG injection, Inject 1 mg under the skin once as needed for low blood sugar for up to 1 dose (Patient not taking: Reported on 12/3/2019), Disp: 1 kit, Rfl: 2    glucose blood (ONE TOUCH ULTRA TEST) test strip, 1 each by Other route 3 (three) times a day Use to test blood sugar, Disp: 130 each, Rfl: 6    hydrOXYzine HCL (ATARAX) 10 mg tablet, Take 10 mg by mouth every 6 (six) hours as needed  , Disp: , Rfl:     ONE TOUCH ULTRA TEST test strip, USE TO TEST BLOOD SUGAR 3 TIMES A DAY, Disp: 100 each, Rfl: 3  Allergies   Allergen Reactions    Invokana [Canagliflozin]     Lamisil [Terbinafine] Rash    Lamisil [Terbinafine] Blisters     Wife states " His skin peeled from head to toe"    Other Swelling     Pomegranate - facial swelling, no swelling of tongue, esophagus  Adhesive tape   Latex Rash         Review of Systems:  Review of Systems   Constitutional: Positive for appetite change, fatigue and unexpected weight change (wt gain due to increased diet)  Negative for activity change  Respiratory: Positive for cough (recent URI)  Negative for chest tightness, shortness of breath and wheezing  Cardiovascular: Positive for leg swelling  Negative for chest pain and palpitations  Gastrointestinal: Negative for abdominal distention, abdominal pain, blood in stool, constipation and diarrhea  Genitourinary: Positive for frequency  Negative for dysuria, hematuria and urgency  Musculoskeletal: Positive for arthralgias  Negative for back pain, gait problem and joint swelling  Neurological: Negative for dizziness, syncope, speech difficulty, numbness and headaches  Psychiatric/Behavioral: Negative for agitation, behavioral problems, confusion and decreased concentration  Physical Exam:  Vitals:    12/03/19 0813   BP: 128/58   BP Location: Left arm   Patient Position: Sitting   Cuff Size: Large   Pulse: 88   Weight: 97 5 kg (215 lb)   Height: 5' 10" (1 778 m)       Physical Exam   Constitutional: He is oriented to person, place, and time  He appears well-developed and well-nourished  No distress     Obese HENT:   Head: Normocephalic and atraumatic  Mouth/Throat: Oropharynx is clear and moist  No oropharyngeal exudate  Eyes: Conjunctivae are normal  No scleral icterus  Neck: Neck supple  Normal carotid pulses and no JVD present  Carotid bruit is present (Transmitted murmur)  No thyromegaly present  Cardiovascular: Normal rate and regular rhythm  Exam reveals no gallop and no friction rub  Murmur heard  Crescendo decrescendo systolic ( basal location) murmur is present with a grade of 2/6  No diastolic murmur is present  Pulses:       Posterior tibial pulses are 2+ on the right side, and 2+ on the left side  Pulmonary/Chest: He has decreased breath sounds  He has no wheezes  He has no rhonchi  He has no rales  Abdominal: Soft  He exhibits no distension and no mass  There is no hepatosplenomegaly  There is no tenderness  Musculoskeletal: He exhibits edema ( 1+ post ankle edema)  He exhibits no tenderness or deformity  Neurological: He is alert and oriented to person, place, and time  He has normal strength  No cranial nerve deficit or sensory deficit  Skin: Skin is warm and dry  No rash noted  No erythema  Psychiatric: He has a normal mood and affect  His behavior is normal  Judgment and thought content normal        Discussion/Summary:  1  Chronic diastolic heart failure  I feel patient is compensated on furosemide 40 mg b i d  Despite 16 lb weight gain  His appetite has improved considerably  His wife is been vigilant of his lower extremity edema and measures his legs at a few sites  He is having no dyspnea  Continue furosemide at the current dosage  In light of renal insufficiency will stop potassium supplementation at this time  Encouraged to take potassium in the form of a banana daily  He does have follow-up blood work for later this month  2  Moderate mitral stenosis  Was not bad enough to indicate operation    Will check echocardiogram with follow-up visit in 4 months time  3  Complete heart block status post permanent pacemaker last December  Device has been functioning fine  Will continue to monitor  4  Essential hypertension  Excellent control with metoprolol, losartan and amlodipine  Continue same  5  Aortic stenosis  Deemed mild on echo earlier this year  Will reassess at echo in 4 months time  6  Hyperlipidemia  LDL cholesterol 54 with HDL cholesterol 36 and triglycerides 96 on current dose of simvastatin  Continue same    7  Paroxysmal atrial fibrillation  Occurred during acute illness  Continue Eliquis for now  Has an interrogation later this week  Will likely stop Eliquis of interrogations continue to show no atrial fibrillation    At that point would add aspirin      Follow-up 4 months with echo    Vern Osborne MD

## 2019-12-04 ENCOUNTER — REMOTE DEVICE CLINIC VISIT (OUTPATIENT)
Dept: CARDIOLOGY CLINIC | Facility: CLINIC | Age: 64
End: 2019-12-04
Payer: MEDICARE

## 2019-12-04 DIAGNOSIS — Z95.0 PACEMAKER: Primary | ICD-10-CM

## 2019-12-04 PROCEDURE — 93294 REM INTERROG EVL PM/LDLS PM: CPT | Performed by: INTERNAL MEDICINE

## 2019-12-04 PROCEDURE — 93296 REM INTERROG EVL PM/IDS: CPT | Performed by: INTERNAL MEDICINE

## 2019-12-13 ENCOUNTER — OFFICE VISIT (OUTPATIENT)
Dept: FAMILY MEDICINE CLINIC | Facility: CLINIC | Age: 64
End: 2019-12-13
Payer: MEDICARE

## 2019-12-13 VITALS
BODY MASS INDEX: 30.35 KG/M2 | SYSTOLIC BLOOD PRESSURE: 140 MMHG | TEMPERATURE: 96.9 F | DIASTOLIC BLOOD PRESSURE: 60 MMHG | HEIGHT: 70 IN | RESPIRATION RATE: 16 BRPM | WEIGHT: 212 LBS | HEART RATE: 80 BPM | OXYGEN SATURATION: 94 %

## 2019-12-13 DIAGNOSIS — J01.10 ACUTE FRONTAL SINUSITIS, RECURRENCE NOT SPECIFIED: Primary | ICD-10-CM

## 2019-12-13 PROCEDURE — 99213 OFFICE O/P EST LOW 20 MIN: CPT | Performed by: FAMILY MEDICINE

## 2019-12-13 RX ORDER — CEFUROXIME AXETIL 250 MG/1
250 TABLET ORAL EVERY 12 HOURS SCHEDULED
Qty: 20 TABLET | Refills: 0 | Status: SHIPPED | OUTPATIENT
Start: 2019-12-13 | End: 2019-12-23

## 2019-12-13 NOTE — PROGRESS NOTES
Assessment/Plan:  Patient will be started on Ceftin 250 mg 1 b i d  For 10 days and instructed to take probiotics while on antibiotics  He may continue Coricidin HBP and Tylenol p r n  Recommend increase fluids and rest   Return the office in 1 week or sooner p r n  Evelio Vasquez Diagnoses and all orders for this visit:    Acute frontal sinusitis, recurrence not specified  -     cefuroxime (CEFTIN) 250 mg tablet; Take 1 tablet (250 mg total) by mouth every 12 (twelve) hours for 10 days          Subjective:      Patient ID: Aarti Coulter is a 75 y o  male  Patient started with cold symptoms 1 week ago  He now complains of nasal congestion productive of yellow-green mucus, sinus pressure headache and nonproductive cough  He denies fever  He has treated this with Coricidin HBP, Tylenol with some improvement  URI    This is a new problem  The current episode started in the past 7 days  The problem has been gradually worsening  There has been no fever  Associated symptoms include congestion, diarrhea, a plugged ear sensation and sinus pain  Pertinent negatives include no coughing, ear pain, headaches, nausea, rhinorrhea, sneezing, sore throat, vomiting or wheezing  He has tried acetaminophen and increased fluids (corisidan HBP) for the symptoms  The treatment provided mild relief  The following portions of the patient's history were reviewed and updated as appropriate: allergies, current medications, past family history, past medical history, past social history, past surgical history and problem list     Review of Systems   HENT: Positive for congestion and sinus pain  Negative for ear pain, rhinorrhea, sneezing and sore throat  Respiratory: Negative for cough and wheezing  Gastrointestinal: Positive for diarrhea  Negative for nausea and vomiting  Neurological: Negative for headaches           Objective:      /60 (BP Location: Left arm, Patient Position: Sitting, Cuff Size: Adult)   Pulse 80 Temp (!) 96 9 °F (36 1 °C)   Resp 16   Ht 5' 9 69" (1 77 m)   Wt 96 2 kg (212 lb)   SpO2 94%   BMI 30 69 kg/m²          Physical Exam   Constitutional: He is oriented to person, place, and time  He appears well-developed and well-nourished  No distress  HENT:   Head: Normocephalic  Right Ear: External ear normal    Left Ear: External ear normal    Turbinates swelling with purulent drainage  Throat with postnasal drainage and mildly injected  Mucous membranes moist    Eyes: Conjunctivae are normal  No scleral icterus  Neck: Neck supple  Cardiovascular: Normal rate and regular rhythm  Pulmonary/Chest: Effort normal and breath sounds normal    Abdominal: Soft  There is no tenderness  Musculoskeletal: He exhibits edema  Lymphadenopathy:     He has no cervical adenopathy  Neurological: He is alert and oriented to person, place, and time  Skin: Skin is warm and dry  Psychiatric: He has a normal mood and affect

## 2019-12-20 DIAGNOSIS — I10 ESSENTIAL (PRIMARY) HYPERTENSION: ICD-10-CM

## 2019-12-22 RX ORDER — AMLODIPINE BESYLATE 10 MG/1
TABLET ORAL
Qty: 90 TABLET | Refills: 3 | Status: SHIPPED | OUTPATIENT
Start: 2019-12-22 | End: 2020-09-29 | Stop reason: SDUPTHER

## 2020-01-07 ENCOUNTER — APPOINTMENT (EMERGENCY)
Dept: RADIOLOGY | Facility: HOSPITAL | Age: 65
DRG: 853 | End: 2020-01-07
Payer: MEDICARE

## 2020-01-07 ENCOUNTER — HOSPITAL ENCOUNTER (INPATIENT)
Facility: HOSPITAL | Age: 65
LOS: 22 days | Discharge: HOME HEALTH | DRG: 853 | End: 2020-01-29
Attending: EMERGENCY MEDICINE | Admitting: HOSPITALIST
Payer: MEDICARE

## 2020-01-07 ENCOUNTER — APPOINTMENT (INPATIENT)
Dept: CT IMAGING | Facility: HOSPITAL | Age: 65
DRG: 853 | End: 2020-01-07
Payer: MEDICARE

## 2020-01-07 DIAGNOSIS — D64.9 ANEMIA: ICD-10-CM

## 2020-01-07 DIAGNOSIS — E11.40 TYPE 2 DIABETES MELLITUS WITH DIABETIC NEUROPATHY, WITHOUT LONG-TERM CURRENT USE OF INSULIN (HCC): ICD-10-CM

## 2020-01-07 DIAGNOSIS — E11.621 DIABETIC FOOT ULCER (HCC): ICD-10-CM

## 2020-01-07 DIAGNOSIS — D72.829 LEUCOCYTOSIS: ICD-10-CM

## 2020-01-07 DIAGNOSIS — L97.509 DIABETIC FOOT ULCER (HCC): ICD-10-CM

## 2020-01-07 DIAGNOSIS — R77.8 ELEVATED TROPONIN: ICD-10-CM

## 2020-01-07 DIAGNOSIS — N17.9 ACUTE RENAL FAILURE SUPERIMPOSED ON STAGE 3 CHRONIC KIDNEY DISEASE, UNSPECIFIED ACUTE RENAL FAILURE TYPE: ICD-10-CM

## 2020-01-07 DIAGNOSIS — J02.9 SORE THROAT: ICD-10-CM

## 2020-01-07 DIAGNOSIS — L53.9 ERYTHEMA OF TOE: ICD-10-CM

## 2020-01-07 DIAGNOSIS — B95.61 MSSA BACTEREMIA: ICD-10-CM

## 2020-01-07 DIAGNOSIS — R79.89 ELEVATED BRAIN NATRIURETIC PEPTIDE (BNP) LEVEL: ICD-10-CM

## 2020-01-07 DIAGNOSIS — M86.172 OTHER ACUTE OSTEOMYELITIS OF LEFT FOOT (HCC): ICD-10-CM

## 2020-01-07 DIAGNOSIS — R68.83 CHILLS: ICD-10-CM

## 2020-01-07 DIAGNOSIS — R78.81 MSSA BACTEREMIA: ICD-10-CM

## 2020-01-07 DIAGNOSIS — E87.1 HYPONATREMIA: ICD-10-CM

## 2020-01-07 DIAGNOSIS — I50.32 CHRONIC DIASTOLIC (CONGESTIVE) HEART FAILURE (HCC): ICD-10-CM

## 2020-01-07 DIAGNOSIS — I10 ESSENTIAL HYPERTENSION: ICD-10-CM

## 2020-01-07 DIAGNOSIS — N18.30 TYPE 2 DIABETES MELLITUS WITH STAGE 3 CHRONIC KIDNEY DISEASE, WITHOUT LONG-TERM CURRENT USE OF INSULIN (HCC): ICD-10-CM

## 2020-01-07 DIAGNOSIS — N18.3 ACUTE RENAL FAILURE SUPERIMPOSED ON STAGE 3 CHRONIC KIDNEY DISEASE, UNSPECIFIED ACUTE RENAL FAILURE TYPE: ICD-10-CM

## 2020-01-07 DIAGNOSIS — R77.8 ELEVATED TROPONIN I LEVEL: ICD-10-CM

## 2020-01-07 DIAGNOSIS — N17.9 AKI (ACUTE KIDNEY INJURY) (HCC): ICD-10-CM

## 2020-01-07 DIAGNOSIS — R53.83 FATIGUE: Primary | ICD-10-CM

## 2020-01-07 DIAGNOSIS — R78.81 BACTEREMIA DUE TO GRAM-POSITIVE BACTERIA: ICD-10-CM

## 2020-01-07 DIAGNOSIS — E11.22 TYPE 2 DIABETES MELLITUS WITH STAGE 3 CHRONIC KIDNEY DISEASE, WITHOUT LONG-TERM CURRENT USE OF INSULIN (HCC): ICD-10-CM

## 2020-01-07 LAB
ALBUMIN SERPL BCP-MCNC: 2.6 G/DL (ref 3.5–5)
ALP SERPL-CCNC: 83 U/L (ref 46–116)
ALT SERPL W P-5'-P-CCNC: 24 U/L (ref 12–78)
ANION GAP SERPL CALCULATED.3IONS-SCNC: 15 MMOL/L (ref 4–13)
ANISOCYTOSIS BLD QL SMEAR: PRESENT
APTT PPP: 49 SECONDS (ref 23–37)
AST SERPL W P-5'-P-CCNC: 24 U/L (ref 5–45)
ATRIAL RATE: 71 BPM
BASOPHILS # BLD MANUAL: 0.2 THOUSAND/UL (ref 0–0.1)
BASOPHILS NFR MAR MANUAL: 1 % (ref 0–1)
BILIRUB SERPL-MCNC: 0.93 MG/DL (ref 0.2–1)
BUN SERPL-MCNC: 51 MG/DL (ref 5–25)
CALCIUM SERPL-MCNC: 8.7 MG/DL (ref 8.3–10.1)
CHLORIDE SERPL-SCNC: 96 MMOL/L (ref 100–108)
CO2 SERPL-SCNC: 21 MMOL/L (ref 21–32)
CREAT SERPL-MCNC: 2.08 MG/DL (ref 0.6–1.3)
EOSINOPHIL # BLD MANUAL: 0 THOUSAND/UL (ref 0–0.4)
EOSINOPHIL NFR BLD MANUAL: 0 % (ref 0–6)
ERYTHROCYTE [DISTWIDTH] IN BLOOD BY AUTOMATED COUNT: 14.2 % (ref 11.6–15.1)
EST. AVERAGE GLUCOSE BLD GHB EST-MCNC: 137 MG/DL
FLUAV RNA NPH QL NAA+PROBE: NORMAL
FLUBV RNA NPH QL NAA+PROBE: NORMAL
GFR SERPL CREATININE-BSD FRML MDRD: 32 ML/MIN/1.73SQ M
GLUCOSE SERPL-MCNC: 188 MG/DL (ref 65–140)
GLUCOSE SERPL-MCNC: 208 MG/DL (ref 65–140)
GLUCOSE SERPL-MCNC: 220 MG/DL (ref 65–140)
HBA1C MFR BLD: 6.4 % (ref 4.2–6.3)
HCT VFR BLD AUTO: 27.8 % (ref 36.5–49.3)
HGB BLD-MCNC: 9.3 G/DL (ref 12–17)
INR PPP: 1.89 (ref 0.84–1.19)
LYMPHOCYTES # BLD AUTO: 0.2 THOUSAND/UL (ref 0.6–4.47)
LYMPHOCYTES # BLD AUTO: 1 % (ref 14–44)
MCH RBC QN AUTO: 31 PG (ref 26.8–34.3)
MCHC RBC AUTO-ENTMCNC: 33.5 G/DL (ref 31.4–37.4)
MCV RBC AUTO: 93 FL (ref 82–98)
MONOCYTES # BLD AUTO: 0.61 THOUSAND/UL (ref 0–1.22)
MONOCYTES NFR BLD: 3 % (ref 4–12)
NEUTROPHILS # BLD MANUAL: 19.39 THOUSAND/UL (ref 1.85–7.62)
NEUTS BAND NFR BLD MANUAL: 5 % (ref 0–8)
NEUTS SEG NFR BLD AUTO: 90 % (ref 43–75)
NRBC BLD AUTO-RTO: 0 /100 WBCS
NT-PROBNP SERPL-MCNC: 4519 PG/ML
P AXIS: 45 DEGREES
PLATELET # BLD AUTO: 240 THOUSANDS/UL (ref 149–390)
PLATELET BLD QL SMEAR: ADEQUATE
PMV BLD AUTO: 9.3 FL (ref 8.9–12.7)
POTASSIUM SERPL-SCNC: 4.3 MMOL/L (ref 3.5–5.3)
PR INTERVAL: 160 MS
PROCALCITONIN SERPL-MCNC: 0.67 NG/ML
PROT SERPL-MCNC: 7.2 G/DL (ref 6.4–8.2)
PROTHROMBIN TIME: 22 SECONDS (ref 11.6–14.5)
QRS AXIS: -74 DEGREES
QRSD INTERVAL: 192 MS
QT INTERVAL: 470 MS
QTC INTERVAL: 510 MS
RBC # BLD AUTO: 3 MILLION/UL (ref 3.88–5.62)
RSV RNA NPH QL NAA+PROBE: NORMAL
S PYO DNA THROAT QL NAA+PROBE: NORMAL
SODIUM SERPL-SCNC: 132 MMOL/L (ref 136–145)
T WAVE AXIS: 72 DEGREES
TOTAL CELLS COUNTED SPEC: 100
TROPONIN I SERPL-MCNC: 0.22 NG/ML
TROPONIN I SERPL-MCNC: 0.43 NG/ML
TROPONIN I SERPL-MCNC: 0.56 NG/ML
VENTRICULAR RATE: 71 BPM
WBC # BLD AUTO: 20.41 THOUSAND/UL (ref 4.31–10.16)

## 2020-01-07 PROCEDURE — 71046 X-RAY EXAM CHEST 2 VIEWS: CPT

## 2020-01-07 PROCEDURE — 94640 AIRWAY INHALATION TREATMENT: CPT

## 2020-01-07 PROCEDURE — 94762 N-INVAS EAR/PLS OXIMTRY CONT: CPT

## 2020-01-07 PROCEDURE — 99285 EMERGENCY DEPT VISIT HI MDM: CPT

## 2020-01-07 PROCEDURE — 71250 CT THORAX DX C-: CPT

## 2020-01-07 PROCEDURE — 87631 RESP VIRUS 3-5 TARGETS: CPT | Performed by: EMERGENCY MEDICINE

## 2020-01-07 PROCEDURE — 84145 PROCALCITONIN (PCT): CPT | Performed by: PHYSICIAN ASSISTANT

## 2020-01-07 PROCEDURE — 80053 COMPREHEN METABOLIC PANEL: CPT | Performed by: EMERGENCY MEDICINE

## 2020-01-07 PROCEDURE — 85027 COMPLETE CBC AUTOMATED: CPT | Performed by: EMERGENCY MEDICINE

## 2020-01-07 PROCEDURE — 94760 N-INVAS EAR/PLS OXIMETRY 1: CPT

## 2020-01-07 PROCEDURE — 87147 CULTURE TYPE IMMUNOLOGIC: CPT | Performed by: EMERGENCY MEDICINE

## 2020-01-07 PROCEDURE — 94660 CPAP INITIATION&MGMT: CPT

## 2020-01-07 PROCEDURE — 83880 ASSAY OF NATRIURETIC PEPTIDE: CPT | Performed by: EMERGENCY MEDICINE

## 2020-01-07 PROCEDURE — 36415 COLL VENOUS BLD VENIPUNCTURE: CPT | Performed by: EMERGENCY MEDICINE

## 2020-01-07 PROCEDURE — 83036 HEMOGLOBIN GLYCOSYLATED A1C: CPT | Performed by: PHYSICIAN ASSISTANT

## 2020-01-07 PROCEDURE — 84484 ASSAY OF TROPONIN QUANT: CPT | Performed by: EMERGENCY MEDICINE

## 2020-01-07 PROCEDURE — 85610 PROTHROMBIN TIME: CPT | Performed by: EMERGENCY MEDICINE

## 2020-01-07 PROCEDURE — 84484 ASSAY OF TROPONIN QUANT: CPT | Performed by: PHYSICIAN ASSISTANT

## 2020-01-07 PROCEDURE — 1123F ACP DISCUSS/DSCN MKR DOCD: CPT | Performed by: INTERNAL MEDICINE

## 2020-01-07 PROCEDURE — 85730 THROMBOPLASTIN TIME PARTIAL: CPT | Performed by: EMERGENCY MEDICINE

## 2020-01-07 PROCEDURE — 87186 SC STD MICRODIL/AGAR DIL: CPT | Performed by: EMERGENCY MEDICINE

## 2020-01-07 PROCEDURE — 87651 STREP A DNA AMP PROBE: CPT | Performed by: EMERGENCY MEDICINE

## 2020-01-07 PROCEDURE — 85007 BL SMEAR W/DIFF WBC COUNT: CPT | Performed by: EMERGENCY MEDICINE

## 2020-01-07 PROCEDURE — 99223 1ST HOSP IP/OBS HIGH 75: CPT | Performed by: HOSPITALIST

## 2020-01-07 PROCEDURE — 93005 ELECTROCARDIOGRAM TRACING: CPT

## 2020-01-07 PROCEDURE — 99223 1ST HOSP IP/OBS HIGH 75: CPT | Performed by: INTERNAL MEDICINE

## 2020-01-07 PROCEDURE — 99291 CRITICAL CARE FIRST HOUR: CPT | Performed by: EMERGENCY MEDICINE

## 2020-01-07 PROCEDURE — 93010 ELECTROCARDIOGRAM REPORT: CPT | Performed by: INTERNAL MEDICINE

## 2020-01-07 PROCEDURE — 87040 BLOOD CULTURE FOR BACTERIA: CPT | Performed by: EMERGENCY MEDICINE

## 2020-01-07 PROCEDURE — 82948 REAGENT STRIP/BLOOD GLUCOSE: CPT

## 2020-01-07 RX ORDER — PRAVASTATIN SODIUM 40 MG
40 TABLET ORAL
Status: DISCONTINUED | OUTPATIENT
Start: 2020-01-07 | End: 2020-01-29 | Stop reason: HOSPADM

## 2020-01-07 RX ORDER — IPRATROPIUM BROMIDE AND ALBUTEROL SULFATE 2.5; .5 MG/3ML; MG/3ML
3 SOLUTION RESPIRATORY (INHALATION) EVERY 6 HOURS PRN
Status: DISCONTINUED | OUTPATIENT
Start: 2020-01-07 | End: 2020-01-29 | Stop reason: HOSPADM

## 2020-01-07 RX ORDER — LORAZEPAM 1 MG/1
0.5 TABLET ORAL EVERY 8 HOURS PRN
Status: DISCONTINUED | OUTPATIENT
Start: 2020-01-07 | End: 2020-01-10

## 2020-01-07 RX ORDER — HEPARIN SODIUM 5000 [USP'U]/ML
5000 INJECTION, SOLUTION INTRAVENOUS; SUBCUTANEOUS EVERY 8 HOURS SCHEDULED
Status: CANCELLED | OUTPATIENT
Start: 2020-01-07

## 2020-01-07 RX ORDER — INSULIN GLARGINE 100 [IU]/ML
9 INJECTION, SOLUTION SUBCUTANEOUS
Status: DISCONTINUED | OUTPATIENT
Start: 2020-01-07 | End: 2020-01-10

## 2020-01-07 RX ORDER — METOPROLOL TARTRATE 50 MG/1
50 TABLET, FILM COATED ORAL EVERY 12 HOURS SCHEDULED
Status: DISCONTINUED | OUTPATIENT
Start: 2020-01-07 | End: 2020-01-29 | Stop reason: HOSPADM

## 2020-01-07 RX ORDER — AZITHROMYCIN 250 MG/1
500 TABLET, FILM COATED ORAL EVERY 24 HOURS
Status: DISCONTINUED | OUTPATIENT
Start: 2020-01-08 | End: 2020-01-08

## 2020-01-07 RX ORDER — FUROSEMIDE 10 MG/ML
40 INJECTION INTRAMUSCULAR; INTRAVENOUS ONCE
Status: COMPLETED | OUTPATIENT
Start: 2020-01-07 | End: 2020-01-07

## 2020-01-07 RX ORDER — LANOLIN ALCOHOL/MO/W.PET/CERES
6 CREAM (GRAM) TOPICAL
Status: DISCONTINUED | OUTPATIENT
Start: 2020-01-07 | End: 2020-01-29 | Stop reason: HOSPADM

## 2020-01-07 RX ORDER — ACETAMINOPHEN 325 MG/1
650 TABLET ORAL EVERY 6 HOURS PRN
Status: DISCONTINUED | OUTPATIENT
Start: 2020-01-07 | End: 2020-01-14

## 2020-01-07 RX ORDER — GUAIFENESIN 600 MG
600 TABLET, EXTENDED RELEASE 12 HR ORAL 2 TIMES DAILY
Status: DISCONTINUED | OUTPATIENT
Start: 2020-01-07 | End: 2020-01-29 | Stop reason: HOSPADM

## 2020-01-07 RX ORDER — ALLOPURINOL 100 MG/1
300 TABLET ORAL EVERY MORNING
Status: DISCONTINUED | OUTPATIENT
Start: 2020-01-08 | End: 2020-01-29 | Stop reason: HOSPADM

## 2020-01-07 RX ORDER — ONDANSETRON 2 MG/ML
4 INJECTION INTRAMUSCULAR; INTRAVENOUS EVERY 6 HOURS PRN
Status: DISCONTINUED | OUTPATIENT
Start: 2020-01-07 | End: 2020-01-10

## 2020-01-07 RX ORDER — AMLODIPINE BESYLATE 10 MG/1
10 TABLET ORAL DAILY
Status: DISCONTINUED | OUTPATIENT
Start: 2020-01-07 | End: 2020-01-19

## 2020-01-07 RX ORDER — TAMSULOSIN HYDROCHLORIDE 0.4 MG/1
0.4 CAPSULE ORAL
Status: DISCONTINUED | OUTPATIENT
Start: 2020-01-07 | End: 2020-01-29 | Stop reason: HOSPADM

## 2020-01-07 RX ADMIN — MELATONIN TAB 3 MG 6 MG: 3 TAB at 21:52

## 2020-01-07 RX ADMIN — INSULIN LISPRO 2 UNITS: 100 INJECTION, SOLUTION INTRAVENOUS; SUBCUTANEOUS at 21:51

## 2020-01-07 RX ADMIN — FUROSEMIDE 40 MG: 10 INJECTION, SOLUTION INTRAMUSCULAR; INTRAVENOUS at 14:22

## 2020-01-07 RX ADMIN — INSULIN GLARGINE 9 UNITS: 100 INJECTION, SOLUTION SUBCUTANEOUS at 21:49

## 2020-01-07 RX ADMIN — INSULIN LISPRO 1 UNITS: 100 INJECTION, SOLUTION INTRAVENOUS; SUBCUTANEOUS at 17:08

## 2020-01-07 RX ADMIN — IPRATROPIUM BROMIDE AND ALBUTEROL SULFATE 3 ML: 2.5; .5 SOLUTION RESPIRATORY (INHALATION) at 20:25

## 2020-01-07 RX ADMIN — AZITHROMYCIN MONOHYDRATE 500 MG: 500 INJECTION, POWDER, LYOPHILIZED, FOR SOLUTION INTRAVENOUS at 17:08

## 2020-01-07 RX ADMIN — TAMSULOSIN HYDROCHLORIDE 0.4 MG: 0.4 CAPSULE ORAL at 17:08

## 2020-01-07 RX ADMIN — CEFTRIAXONE 1000 MG: 1 INJECTION, POWDER, FOR SOLUTION INTRAMUSCULAR; INTRAVENOUS at 17:09

## 2020-01-07 RX ADMIN — GUAIFENESIN 600 MG: 600 TABLET ORAL at 21:49

## 2020-01-07 RX ADMIN — ACETAMINOPHEN 650 MG: 325 TABLET ORAL at 23:55

## 2020-01-07 RX ADMIN — APIXABAN 5 MG: 5 TABLET, FILM COATED ORAL at 21:49

## 2020-01-07 RX ADMIN — PRAVASTATIN SODIUM 40 MG: 40 TABLET ORAL at 17:08

## 2020-01-07 RX ADMIN — METOPROLOL TARTRATE 50 MG: 50 TABLET, FILM COATED ORAL at 21:49

## 2020-01-07 NOTE — ASSESSMENT & PLAN NOTE
With suspected impending sepsis  Neutrophilic  Will give rocephin/azithromax  F/u bcx, procalcitonin, monitor cbc vs closely

## 2020-01-07 NOTE — ASSESSMENT & PLAN NOTE
Pt w/o sob but worsening hypoxia despite 5-6L NC in 82-88% former smoker but no diagnosed copd  Suspect 2* occult pneumonia +/- mild CHF, although no other s/sx of active volume overload  cxr concerning for small effusion/interstitial edema but no orthopnea/pnd  Flu negative and strep a pcr negative    As pt feels ill, w/rigors, anorexia and purulent retropharyngeal erythema suspect occult pna  Check dedicated ct chest no contrast, legionella/strep pneumo antigen  Start rocephin/azithromax, f/u bcx, procalcitonin

## 2020-01-07 NOTE — ASSESSMENT & PLAN NOTE
Lab Results   Component Value Date    HGBA1C 6 1 09/19/2019       No results for input(s): POCGLU in the last 72 hours      Blood Sugar Average: Last 72 hrs:   iddm on insulin degludec-liraglutide 18 iu daily  Will transition to lantus at 9 iu qhs and start ssi/poc bs given poor appetite

## 2020-01-07 NOTE — CONSULTS
Consult - Cardiology   Mena Uribe 75 y o  male MRN: [id number]  Unit/Bed#: E4 -01 Encounter: [id number]        Reason For Consult:  Acute hypoxic respiratory failure                 ASSESSMENT:  1  Acute hypoxic respiratory failure:   -primary reason for admission   -suspected PNA +/-mild CHF  2  Chronic diastolic congestive heart failure:   -baseline weight:  215 lb (as OP 12/3/19)   -OP diuretic:  Lasix 40 b i d   3  Moderate aortic and mitral stenosis on echo 05/19  4  Complete heart block, s/p [employer]onic dual-chamber PPM  5  Hypertension   6  Dyslipidemia  7  Acute on chronic kidney injury:   -creatinine 2 08 on admission   -as low as 1 1 July of 2019  8  Elevated troponin, 0 22 on admission  9  Type 2 diabetes mellitus  10  Paroxysmal atrial fibrillation   -stroke prophylaxis:  Eliquis   -rate control:  Lopressor 50 b i d   11  Hypoalbuminemia, 2 6      PLAN/ DISCUSSION:     1  Agree with CT scan of his chest to get a better idea of if he may have an infectious process    2  He is stable around his dry weight (214 lbs) and creatinine is up a bit  Would hold any further Lasix until we can repeat a BMP tomorrow  Lungs are fairly clear and swelling is at baseline  He is lying nearly supine comfortably at the time of my consultation  3  Once creatinine normalizes restart Lasix 40 b i d     4  Follow-up echocardiogram to assess valvular disease, this was scheduled for 3 months from now and can likely be pursued as outpatient    5  Agree with holding losartan for now until renal function improves    6  Continue metoprolol and amlodipine    7  Continue statin for his dyslipidemia    8   Ongoing Eliquis anticoagulation, will defer to primary cardiologist in future device checks in regards to taking off anticoagulation and starting aspirin instead      History Of Present Illness:  Is a very kind 33-year-old gentleman with a cardiac history significant for sick sinus syndrome with dual-chamber pacemaker in situ, chronic diastolic heart failure, paroxysmal atrial fibrillation, hypertension, and dyslipidemia  He is normally cared for by Dr Mylo Phoenix of our cardiology group with her most recent office visit being about 1 month ago in December 2019  He was reportedly doing well at this visit and had gained some weight as his appetite has returned  He was 215 lb and appears euvolemic at this time  His diuretic was Lasix 40 twice daily  He was last hospitalized in May 2019 on 2 separate occasions for acute exacerbations of diastolic heart failure  This gentleman is presently hospitalized for a multitude of symptoms  These have been ongoing for the past 3 days or so  They consist of generalized weakness, chills, and fevers  He took his temperature at home noting it was up to 101 9  He has been trying to ignore symptoms and taking Tylenol which has not helped very much  His wife at bedside notes that they have been attending some basketball games and public events weather may have been other sick contacts  This morning January 7, 2020 was when he took his temperature noting it was 101 9 and decided to see his family doctor  He felt so weak and poor that it took him up to an hour to get dressed so his wife decided to bring him to the emergency department for evaluation instead  Presently he still feels weak and feverish but is more comfortable on supplemental oxygen  He has had no recent weight gain  He maintains a low-salt diet  His swelling in his legs at baseline  He has had no increased work of breathing at night and is still able to lay flat breathe comfortably with his CPAP        Past Medical History:        Past Medical History:   Diagnosis Date    Arthritis     OA    Bruise of both arms     forearms and both hands    Bruises easily     CHF (congestive heart failure) (HCC)     Diabetes mellitus ([address] )     [facility] ulcer ([facility])     Eczema     Erectile dysfunction     Gout     Hyperlipidemia     Hypertension     Murmur     Nephropathy     Osteoarthritis     PAD (peripheral artery disease) (HCC)     Seasonal allergies     Toe infection     bilat great toes    Vertigo     Walks frequently     Wears dentures     upper    Wears glasses       Past Surgical History:   Procedure Laterality Date    CARDIAC PACEMAKER PLACEMENT      CARPAL TUNNEL RELEASE Left     CARPAL TUNNEL RELEASE Right     CARPAL TUNNEL RELEASE      KNEE ARTHROSCOPY Left     KNEE ARTHROSCOPY Right     KNEE SURGERY      VT AMPUTATION TOE,I-P JT Bilateral 5/2/2017    Procedure: PARTIAL AMPUTATION RIGHT AND LEFT HALLUX ;  Surgeon: Nora Chapman DPM;  Location: [facility];  Service: Podiatry    VT AMPUTATION TOE,MT-P JT Left 7/25/2017    Procedure: 2ND TOE AMPUTATION;  Surgeon: Nora Chapman DPM;  Location: [facility];  Service: Podiatry    SHOULDER ARTHROSCOPY Left     with screws,RTC    SHOULDER SURGERY      VASECTOMY          Allergy:        Allergies   Allergen Reactions    Invokana [Canagliflozin]     Lamisil [Terbinafine] Rash    Lamisil [Terbinafine] Blisters     Wife states " His skin peeled from head to toe"    Other Swelling     Pomegranate - facial swelling, no swelling of tongue, esophagus  Adhesive tape   Latex Rash       Medications:       Prior to Admission medications    Medication Sig Start Date End Date Taking?  Authorizing Provider   allopurinol (ZYLOPRIM) 300 mg tablet TAKE 1 TABLET (300 MG TOTAL) BY MOUTH EVERY MORNING 2/9/19   Clarissa Mckinney DO   amLODIPine (NORVASC) 10 mg tablet TAKE 1 TABLET BY MOUTH EVERY DAY 12/22/19   Kera Wolf MD   apixaban (ELIQUIS) 5 mg Take 1 tablet (5 mg total) by mouth every 12 (twelve) hours 9/25/19   Janie Lopez MD   betamethasone valerate (VALISONE) 0 1 % cream APPLY TO BACK,ARMS,CHEST,LEGS AND ABDOMEN TWICE A DAY 10/1/18   Historical Provider, MD   Cholecalciferol (VITAMIN D-3) 1000 units CAPS Take 1 capsule by mouth every morning Historical Provider, MD Malinilumab, Asthma, ([facility]) Inject under the skin    Historical Provider, MD   econazole nitrate 1 % cream Apply 1 application topically 2 (two) times a day To affected area 8/22/19   Historical Provider, MD   furosemide (LASIX) 40 mg tablet Take 1 tablet (40 mg total) by mouth 2 (two) times a day 11/27/19   Jovon Zee MD   glucagon (GLUCAGON EMERGENCY) 1 MG injection Inject 1 mg under the skin once as needed for low blood sugar for up to 1 dose 3/8/19   PAT Rouse   glucose blood (ONE TOUCH ULTRA TEST) test strip 1 each by Other route 3 (three) times a day Use to test blood sugar 6/20/19   PAT Rouse   hydrOXYzine HCL (ATARAX) 10 mg tablet Take 10 mg by mouth every 6 (six) hours as needed   10/24/18   Historical Provider, MD   Insulin Degludec-Liraglutide (XULTOPHY) 100 units-3 6 mg/mL injection pen Inject 16 Units under the skin daily for 30 days  Patient taking differently: Inject 18 Units under the skin daily  6/20/19 12/3/19  PAT Rouse   losartan (COZAAR) 50 mg tablet Take 0 5 tablets (25 mg total) by mouth daily 5/23/19   Jovon Zee MD   metFORMIN (GLUCOPHAGE) 500 mg tablet Take 1 tablet (500 mg total) by mouth 2 (two) times a day with meals [id number] /twice a day 6/20/19   PAT Rouse   metolazone (ZAROXOLYN) 5 mg tablet Take 1 tablet (5 mg total) by mouth see administration instructions One tablet every 6 days prn and as directed by physician 5/23/19   Jovon Zee MD   metoprolol tartrate (LOPRESSOR) 50 mg tablet Take 1 tablet (50 mg total) by mouth every 12 (twelve) hours 11/27/18   Jovon Zee MD   Multiple Vitamin (MULTIVITAMIN) tablet Take 1 tablet by mouth daily IN AM    Historical Provider, MD   omega-3-acid ethyl esters (LOVAZA) 1 g capsule Take 2 capsules (2 g total) by mouth 2 (two) times a day 12/5/18   Cameron Hyman MD   ONE TOUCH ULTRA TEST test strip USE TO TEST BLOOD SUGAR 3 TIMES A DAY 10/31/19 Deep Nicholas DO   simvastatin (ZOCOR) 20 mg tablet Take 1 tablet (20 mg total) by mouth daily at bedtime 11/27/18   Krystian Abel MD   tamsulosin [facility]) 0 4 mg Take 0 4 mg by mouth daily with dinner 9/25/19   Historical Provider, MD   glimepiride (AMARYL) 4 mg tablet TAKE 1 TABLET BY MOUTH TWICE A DAY  Patient taking differently: TAKE 1 TABLET BY MOUTH IN THE AM AND 1/2 TABLET IN PM 10/30/18 5/24/19  Estephania Daily DO       Family History:     Family History   Problem Relation Age of Onset    Heart disease Father     No Known Problems Mother     Hypertension Father     Pulmonary embolism Father         Social History:       Social History     Socioeconomic History    Marital status: /Civil Union     Spouse name: None    Number of children: None    Years of education: None    Highest education level: None   Occupational History    None   Social Needs    Financial resource strain: None    Food insecurity:     Worry: None     Inability: None    Transportation needs:     Medical: None     Non-medical: None   Tobacco Use    Smoking status: Former Smoker     Packs/day: 1 00     Years: 30 00     Pack years: 30 00     Types: Cigarettes    Smokeless tobacco: Never Used    Tobacco comment: quit 10 years ago   Substance and Sexual Activity    Alcohol use: Never     Frequency: Never    Drug use: Never    Sexual activity: None   Lifestyle    Physical activity:     Days per week: None     Minutes per session: None    Stress: None   Relationships    Social connections:     Talks on phone: None     Gets together: None     Attends Restoration service: None     Active member of club or organization: None     Attends meetings of clubs or organizations: None     Relationship status: None    Intimate partner violence:     Fear of current or ex partner: None     Emotionally abused: None     Physically abused: None     Forced sexual activity: None   Other Topics Concern    None   Social History Narrative ** Merged History Encounter **         Daily cola consumption (2 cans/day)       ROS:  Symptoms per HPI  Remainder review of systems is negative    Exam:  General:  alert, oriented and in no distress, cooperative  Head: Normocephalic, atraumatic  Eyes:  EOMI  Pupils - equal, round, reactive to accomodation  No icterus  Normal Conjunctiva  Oropharynx: moist and normal-appearing mucosa  Neck: supple, symmetrical, trachea midline and no JVD  Heart:  RRR, murmur noted  Respiratory effort / Chest Inspection: unlabored  Lungs:  Fairly clear bilaterally, no rales  Abdomen: flat, normal findings: bowel sounds normal and soft, non-tender  Lower Limbs:  1+ edema bilaterally, baseline  Pulses[de-identified]  RLE - DP: present 2+                 LLE - DP: present 2+  Musculoskeletal: ROM grossly normal      DATA:      ECG:      Normal sinus rhythm  Atrial-sensed ventricular-paced rhythm                     Echocardiogram:     May 2019:  SUMMARY     LEFT VENTRICLE:  Systolic function was hyperdynamic  Ejection fraction was estimated to be 70 %  Wall thickness was mildly increased      LEFT ATRIUM:  The atrium was mildly dilated      MITRAL VALVE:  There was moderate annular calcification  There was moderate stenosis (transvalvular diastolic gradient ~ 7 mmHg at HR 90 BPM)     AORTIC VALVE:  Transaortic velocity was increased due to valvular stenosis  There was very mild stenosis      PULMONIC VALVE:  Transpulmonic velocity was increased due to increased transvalvular flow      PERICARDIUM:  There was a possible left pleural effusion      HISTORY: PRIOR HISTORY: Congestive heart failure  Peripheral vascular disease  Risk factors: hypertension, diabetes, and medication-treated hypercholesterolemia  PRIOR PROCEDURES: Pacemaker implantation      PROCEDURE: The procedure was performed at the bedside  This was a routine study  The transthoracic approach was used   The study included complete 2D imaging, M-mode, complete spectral Doppler, and color Doppler  The heart rate was 95 bpm,  at the start of the study  Intravenous contrast (  8ml of Definity) was administered  Echocardiographic views were limited due to restricted patient mobility, poor acoustic window availability, decreased penetration, and lung interference  This was a technically difficult study      LEFT VENTRICLE: Size was normal  Systolic function was hyperdynamic  Ejection fraction was estimated to be 70 %  Wall thickness was mildly increased      RIGHT VENTRICLE: The size was normal  Systolic function was normal  Wall thickness was normal      LEFT ATRIUM: The atrium was mildly dilated      RIGHT ATRIUM: Size was normal  A pacing wire was present      MITRAL VALVE: There was moderate annular calcification  DOPPLER: There was moderate stenosis (transvalvular diastolic gradient ~ 7 mmHg at HR 90 BPM)     AORTIC VALVE: The valve was trileaflet  Leaflets exhibited mildly increased thickness, normal cuspal separation, and sclerosis  DOPPLER: Transaortic velocity was increased due to valvular stenosis  There was very mild stenosis  There was  no regurgitation      TRICUSPID VALVE: The valve structure was normal  There was normal leaflet separation  DOPPLER: The transtricuspid velocity was within the normal range  There was no evidence for stenosis  There was no regurgitation      PULMONIC VALVE: Not well visualized  DOPPLER: Transpulmonic velocity was increased due to increased transvalvular flow  There was no evidence for stenosis  There was no regurgitation      PERICARDIUM: There was a possible left pleural effusion      AORTA: The root exhibited normal size      SYSTEMIC VEINS: IVC: The inferior vena cava was normal in size and course   Respirophasic changes were normal      PULMONARY ARTERY: DOPPLER: The tricuspid jet envelope definition was inadequate for estimation of RV systolic pressure      SYSTEM MEASUREMENT TABLES     2D  %FS: 39 3 %  Ao Diam: 3 cm  EDV(Teich): 107 9 ml  EF(Teich): 69 7 %  ESV(Teich): 32 6 ml  IVSd: 1 3 cm  LA Diam: 4 9 cm  LVIDd: 4 8 cm  LVIDs: 2 9 cm  LVOT Diam: 2 1 cm  LVPWd: 1 2 cm  SV(Teich): 75 2 ml     CW  AV Env  Ti: 274 ms  AV VTI: 55 cm  AV Vmax: 2 9 m/s  AV Vmean: 2 m/s  AV maxP 3 mmHg  AV meanP 7 mmHg  HR: 87 3 BPM  MV VTI: 76 8 cm  MV Vmax: 2 6 m/s  MV Vmean: 1 6 m/s  MV maxP 2 mmHg  MV meanP 5 mmHg     MM  TAPSE: 2 7 cm     PW  FITO (VTI): 2 1 cm2  FITO Vmax: 1 9 cm2  LVOT Env  Ti: 285 4 ms  LVOT VTI: 35 cm  LVOT Vmax: 1 7 m/s  LVOT Vmean: 1 2 m/s  LVOT maxP mmHg  LVOT meanP 9 mmHg  LVSV Dopp: 115 4 ml  MVA (VTI): 1 5 cm2       Ischemic Testing:  Left and right heart catheterization May 2019:  SUMMARY     CORONARY CIRCULATION:  Mid LAD: There was a 50 % stenosis      INDICATIONS:  --  Congestive heart failure      PROCEDURES PERFORMED     --  Left heart catheterization without ventriculogram   --  Left coronary angiography  --  Right coronary angiography  --  Inpatient  --  Mod Sedation Same Physician Initial 15min  --  Coronary Catheterization (w/ [facility])     PROCEDURE: The risks and alternatives of the procedures and conscious sedation were explained to the patient and informed consent was obtained  The patient was brought to the cath lab and placed on the table  The planned puncture sites  were prepped and draped in the usual sterile fashion      --  Right radial artery access  After performing an Lars's test to verify adequate ulnar artery supply to the hand, the radial site was prepped  The puncture site was infiltrated with local anesthetic  The vessel was accessed using the  modified Seldinger technique, a wire was advanced into the vessel, and a sheath was advanced over the wire into the vessel      --  Left heart catheterization without ventriculogram  A catheter was advanced over a guidewire into the ascending aorta   After recording ascending aortic pressure, the catheter was advanced across the aortic valve and left ventricular  pressure was recorded  The catheter was pulled back across the aortic valve and into the ascending aorta and pullback pressures were obtained      --  Left coronary artery angiography  A catheter was advanced over a guidewire into the aorta and positioned in the left coronary artery ostium under fluoroscopic guidance  Angiography was performed      --  Right coronary artery angiography  A catheter was advanced over a guidewire into the aorta and positioned in the right coronary artery ostium under fluoroscopic guidance  Angiography was performed      --  Inpatient      --  Mod Sedation Same Physician Initial 15min      --  Coronary Catheterization (w/ [facility])     PROCEDURE COMPLETION: The patient tolerated the procedure well and was discharged from the cath lab  TIMING: Test concluded at 09:19  HEMOSTASIS: The sheath was removed  The site was compressed with a Hemoband device  Hemostasis was  obtained  MEDICATIONS GIVEN: Verapamil (Isoptin, Calan, Covera), 2 5 mg, IA, at 09:13  Fentanyl (1OOmcg/2 ml), 50 mcg, IV, at 09:07  Versed (2mg/2ml), 2 mg, IV, at 09:07  1% Lidocaine, 1 ml, subcutaneously, at 09:12  Heparin 1000 units/ml,  4,000 units, at 09:13  Nitroglycerin (200mcg/ml), 200 mcg, at 09:13  CONTRAST GIVEN: 75 ml Omnipaque (350mg I /ml)  RADIATION EXPOSURE: Fluoroscopy time: 0 98 min  Fluoroscopy dose: 19 175 Thrasher      HEMODYNAMICS: Hemodynamic assessment demonstrated normal LVEDP      CORONARY VESSELS:   --  The coronary circulation is right dominant  --  Left main: Angiography showed minor luminal irregularities  --  Mid LAD: There was a 50 % stenosis  --  Circumflex: Angiography showed minor luminal irregularities  --  RCA: Angiography showed minor luminal irregularities      IMPRESSIONS:  No significant obstructive epicardial CAD   Normal LVEDP     RECOMMENDATIONS  follow up narinder     DISPOSITION:  The patient left the catheterization laboratory in stable condition      Prepared and signed by  Lloyd Snow DO  Signed 2019 09:24:15     Study diagram     Angiographic findings  Native coronary lesions:  ï¾·Mid LAD: Lesion 1: 50 % stenosis      Hemodynamic tables     Pressures:  Baseline  Pressures:  - HR: 75  Pressures:  - Rhythm:  Pressures:  -- Aortic Pressure (S/D/M): 131/52/57  Pressures:  -- Left Ventricle (s/edp): 130/12/--     Outputs:  Baseline  Outputs:  -- CALCULATIONS: [age]  Outputs:  -- CALCULATIONS: Body Surface Area: 2 12  Outputs:  -- CALCULATIONS: Height in cm: 178 00  Outputs:  -- CALCULATIONS: Sex: Male  Outputs:  -- CALCULATIONS: Weight in k 20         Weights: Wt Readings from Last 3 Encounters:   20 97 2 kg (214 lb 4 6 oz)   19 96 2 kg (212 lb)   19 97 5 kg (215 lb)   , Body mass index is 31 03 kg/m²           Lab Studies:    Results from last 7 days   Lab Units 20  1207   TROPONIN I ng/mL 0 22*          Results from last 7 days   Lab Units 20  1207   WBC Thousand/uL 20 41*   HEMOGLOBIN g/dL 9 3*   HEMATOCRIT % 27 8*   PLATELETS Thousands/uL 240   ,   Results from last 7 days   Lab Units 20  1207   POTASSIUM mmol/L 4 3   CHLORIDE mmol/L 96*   CO2 mmol/L 21   BUN mg/dL 51*   CREATININE mg/dL 2 08*   CALCIUM mg/dL 8 7   ALK PHOS U/L 83   ALT U/L 24   AST U/L 24

## 2020-01-07 NOTE — ASSESSMENT & PLAN NOTE
Wt Readings from Last 3 Encounters:   01/07/20 97 2 kg (214 lb 4 6 oz)   12/13/19 96 2 kg (212 lb)   12/03/19 97 5 kg (215 lb)       W/possible acute on chronic diastolic CHF  cxr concerning for effusion volume overload however no worsening PND/orthopnea or LE edema with higher suspicion for occult pneumonia    nt pro bnp is elevated at 4500  Check dedicated ct chest  ELBERT w/LVEF 60% but moderate to severe mitral stenosis  Will trial IV lasix 40mg x 1, continue bb, hold arb  Will appreciate cardiology recommendations

## 2020-01-07 NOTE — ASSESSMENT & PLAN NOTE
Suspected elzbieta on ckd  ELZBIETA may be due to illness and poor oral intake  Chronically suspect diabetic nephropathy vs htn nephrosclerosis  Appears intravascularly depleted but with cxr and LE edema there is concern for possible volume overload  Baseline estimated at 1 4-1 7  On admission 2 08  Will hold metformin/cozaar  CXR concerning for possible volume overload however has no worsening orthopnea/pnd and no worsening LE edema  Check ua w/microscopy  F/u ct for assessment of occult pneumonia for assessment of fluid status  Will trial IV lasix 40mg x 1

## 2020-01-07 NOTE — ED PROVIDER NOTES
History  Chief Complaint   Patient presents with    Fatigue     Pt with multiple complaints: sore throat, diarrhea, fatigue, fevers, tylenol at 0930  -ill contacts  History provided by:  Patient   used: No    Fatigue   Severity:  Moderate  Onset quality:  Gradual  Duration:  3 days  Timing:  Intermittent  Progression:  Waxing and waning  Chronicity:  New  Context comment:  URI symptoms  Relieved by:  Nothing  Worsened by:  Nothing  Ineffective treatments:  None tried  Associated symptoms: no abdominal pain, no chest pain, no cough, no diarrhea, no difficulty walking, no dizziness, no dysuria, no fever, no headaches, no loss of consciousness, no nausea, no shortness of breath and no vomiting    Risk factors: congestive heart failure and diabetes    Risk factors comment:  Atrial fibrillation      Prior to Admission Medications   Prescriptions Last Dose Informant Patient Reported? Taking?    Cholecalciferol (VITAMIN D-3) 1000 units CAPS  Self Yes No   Sig: Take 1 capsule by mouth every morning     Dupilumab, Asthma, (DUPIXENT SC)  Self Yes No   Sig: Inject under the skin   Insulin Degludec-Liraglutide (XULTOPHY) 100 units-3 6 mg/mL injection pen  Self No No   Sig: Inject 16 Units under the skin daily for 30 days   Patient taking differently: Inject 18 Units under the skin daily    Multiple Vitamin (MULTIVITAMIN) tablet  Self Yes No   Sig: Take 1 tablet by mouth daily IN AM   ONE TOUCH ULTRA TEST test strip   No No   Sig: USE TO TEST BLOOD SUGAR 3 TIMES A DAY   allopurinol (ZYLOPRIM) 300 mg tablet  Self No No   Sig: TAKE 1 TABLET (300 MG TOTAL) BY MOUTH EVERY MORNING   amLODIPine (NORVASC) 10 mg tablet   No No   Sig: TAKE 1 TABLET BY MOUTH EVERY DAY   apixaban (ELIQUIS) 5 mg   No No   Sig: Take 1 tablet (5 mg total) by mouth every 12 (twelve) hours   betamethasone valerate (VALISONE) 0 1 % cream  Self Yes No   Sig: APPLY TO BACK,ARMS,CHEST,LEGS AND ABDOMEN TWICE A DAY   econazole nitrate 1 % cream   Yes No   Sig: Apply 1 application topically 2 (two) times a day To affected area   furosemide (LASIX) 40 mg tablet   No No   Sig: Take 1 tablet (40 mg total) by mouth 2 (two) times a day   glucagon (GLUCAGON EMERGENCY) 1 MG injection  Self No No   Sig: Inject 1 mg under the skin once as needed for low blood sugar for up to 1 dose   glucose blood (ONE TOUCH ULTRA TEST) test strip  Self No No   Si each by Other route 3 (three) times a day Use to test blood sugar   hydrOXYzine HCL (ATARAX) 10 mg tablet  Self Yes No   Sig: Take 10 mg by mouth every 6 (six) hours as needed     losartan (COZAAR) 50 mg tablet  Self No No   Sig: Take 0 5 tablets (25 mg total) by mouth daily   metFORMIN (GLUCOPHAGE) 500 mg tablet  Self No No   Sig: Take 1 tablet (500 mg total) by mouth 2 (two) times a day with meals [id number] /twice a day   metolazone (ZAROXOLYN) 5 mg tablet  Self No No   Sig: Take 1 tablet (5 mg total) by mouth see administration instructions One tablet every 6 days prn and as directed by physician   metoprolol tartrate (LOPRESSOR) 50 mg tablet  Self No No   Sig: Take 1 tablet (50 mg total) by mouth every 12 (twelve) hours   omega-3-acid ethyl esters (LOVAZA) 1 g capsule  Self No No   Sig: Take 2 capsules (2 g total) by mouth 2 (two) times a day   simvastatin (ZOCOR) 20 mg tablet  Self No No   Sig: Take 1 tablet (20 mg total) by mouth daily at bedtime   tamsulosin (FLOMAX) 0 4 mg   Yes No   Sig: Take 0 4 mg by mouth daily with dinner      Facility-Administered Medications: None       Past Medical History:   Diagnosis Date    Arthritis     OA    Bruise of both arms     forearms and both hands    Bruises easily     [facility]     Diabetes mellitus [facility] [address] [facility]     [facility]     Eczema     Erectile dysfunction     Gout     Hyperlipidemia     Hypertension     Murmur     Nephropathy     Osteoarthritis     [facility]AD [facility]peripheral artery disease) [facility]     Seasonal allergies     Toe infection     bilat great toes    Vertigo     Walks frequently     Wears dentures     upper    Wears glasses        Past Surgical History:   Procedure Laterality Date    CARDIAC PACEMAKER PLACEMENT      CARPAL TUNNEL RELEASE Left     CARPAL TUNNEL RELEASE Right     CARPAL TUNNEL RELEASE      KNEE ARTHROSCOPY Left     KNEE ARTHROSCOPY Right     KNEE SURGERY      NV AMPUTATION TOE,I-P JT Bilateral 5/2/2017    Procedure: PARTIAL AMPUTATION RIGHT AND LEFT HALLUX ;  Surgeon: Laly Calle DPM;  Location: [facility];  Service: Podiatry    NV AMPUTATION TOE,MT-P JT Left 7/25/2017    Procedure: 2ND TOE AMPUTATION;  Surgeon: Laly Calle DPM;  Location: [facility];  Service: Podiatry    SHOULDER ARTHROSCOPY Left     with screws,RTC    SHOULDER SURGERY      VASECTOMY         Family History   Problem Relation Age of Onset    Heart disease Father     No Known Problems Mother     Hypertension Father     Pulmonary embolism Father      I have reviewed and agree with the history as documented  Social History     Tobacco Use    Smoking status: Former Smoker     Packs/day: 1 00     Years: 30 00     Pack years: 30 00     Types: Cigarettes    Smokeless tobacco: Never Used    Tobacco comment: quit 10 years ago   Substance Use Topics    Alcohol use: Never     Frequency: Never    Drug use: Never        Review of Systems   Constitutional: Positive for fatigue  Negative for chills and fever  HENT: Negative for facial swelling, sore throat and trouble swallowing  Eyes: Negative for pain and visual disturbance  Respiratory: Negative for cough and shortness of breath  Cardiovascular: Negative for chest pain and leg swelling  Gastrointestinal: Negative for abdominal pain, blood in stool, diarrhea, nausea and vomiting  Genitourinary: Negative for dysuria and flank pain  Musculoskeletal: Negative for back pain, neck pain and neck stiffness  Skin: Negative for pallor and rash  Allergic/Immunologic: Negative for environmental allergies and immunocompromised state  Neurological: Negative for dizziness, loss of consciousness and headaches  Hematological: Negative for adenopathy  Does not bruise/bleed easily  Psychiatric/Behavioral: Negative for agitation and behavioral problems  All other systems reviewed and are negative  Physical Exam  Physical Exam   Constitutional: He is oriented to person, place, and time  He appears well-developed and well-nourished  No distress  HENT:   Head: Normocephalic and atraumatic  Eyes: EOM are normal    Neck: Normal range of motion  Neck supple  Cardiovascular: Normal rate, regular rhythm, normal heart sounds and intact distal pulses  Pulmonary/Chest: Effort normal and breath sounds normal    Abdominal: Soft  Bowel sounds are normal  There is no tenderness  There is no rebound and no guarding  Musculoskeletal: Normal range of motion  He exhibits edema  Neurological: He is alert and oriented to person, place, and time  Skin: Skin is warm and dry  Psychiatric: He has a normal mood and affect  Nursing note and vitals reviewed        Vital Signs  ED Triage Vitals   Temperature Pulse Respirations Blood Pressure SpO2   01/07/20 1118 01/07/20 1118 01/07/20 1118 01/07/20 1118 01/07/20 1118   99 1 °F (37 3 °C) 76 18 122/59 91 %      Temp Source Heart Rate Source Patient Position - Orthostatic VS BP Location FiO2 (%)   01/07/20 1118 01/07/20 1118 01/07/20 1312 01/07/20 1118 --   Temporal Monitor Lying Right arm       Pain Score       01/07/20 1118       No Pain           Vitals:    01/07/20 1118 01/07/20 1312   BP: 122/59 128/58   Pulse: 76    Patient Position - Orthostatic VS:  Lying         Visual Acuity      ED Medications  Medications - No data to display    Diagnostic Studies  Results Reviewed     Procedure Component Value Units Date/Time    Influenza A/B and RSV PCR [[id number]]  (Normal) Collected:  01/07/20 1247    Lab Status: Final result Specimen:  Nasopharyngeal Swab Updated:  01/07/20 1334     INFLUENZA A PCR None Detected     INFLUENZA B PCR None Detected     RSV PCR None Detected    Procalcitonin [[id number]] Collected:  01/07/20 1329    Lab Status: In process Specimen:  Blood from Arm, Right Updated:  01/07/20 1333    Blood culture #1 [[id number]] Collected:  01/07/20 1329    Lab Status: In process Specimen:  Blood from Hand, Right Updated:  01/07/20 1332    Blood culture #2 [[id number]] Collected:  01/07/20 1329    Lab Status: In process Specimen:  Blood from Arm, Left Updated:  01/07/20 1332    CBC and differential [[id number]]  (Abnormal) Collected:  01/07/20 1207    Lab Status:  Final result Specimen:  Blood from Arm, Right Updated:  01/07/20 1248     WBC 20 41 Thousand/uL      RBC 3 00 Million/uL      Hemoglobin 9 3 g/dL      Hematocrit 27 8 %      MCV 93 fL      MCH 31 0 pg      MCHC 33 5 g/dL      RDW 14 2 %      MPV 9 3 fL      Platelets 856 Thousands/uL      nRBC 0 /100 WBCs     Narrative: This is an appended report  These results have been appended to a previously verified report      NT-BNP PRO [[id number]]  (Abnormal) Collected:  01/07/20 1207    Lab Status:  Final result Specimen:  Blood from Arm, Right Updated:  01/07/20 1239     NT-proBNP 4,519 pg/mL     Troponin I [[id number]]  (Abnormal) Collected:  01/07/20 1207    Lab Status:  Final result Specimen:  Blood from Arm, Right Updated:  01/07/20 1238     Troponin I 0 22 ng/mL     Strep A PCR [[id number]]  (Normal) Collected:  01/07/20 1145    Lab Status:  Final result Specimen:  Throat Updated:  01/07/20 1233     STREP A PCR None Detected    Protime-INR [[id number]]  (Abnormal) Collected:  01/07/20 1207    Lab Status:  Final result Specimen:  Blood from Arm, Right Updated:  01/07/20 1233     Protime 22 0 seconds      INR 1 89    APTT [[id number]]  (Abnormal) Collected:  01/07/20 1207    Lab Status:  Final result Specimen:  Blood from Arm, Right Updated:  01/07/20 1233 PTT 49 seconds     Comprehensive metabolic panel [[id number]]  (Abnormal) Collected:  01/07/20 1207    Lab Status:  Final result Specimen:  Blood from Arm, Right Updated:  01/07/20 1231     Sodium 132 mmol/L      Potassium 4 3 mmol/L      Chloride 96 mmol/L      CO2 21 mmol/L      ANION GAP 15 mmol/L      BUN 51 mg/dL      Creatinine 2 08 mg/dL      Glucose 208 mg/dL      Calcium 8 7 mg/dL      AST 24 U/L      ALT 24 U/L      Alkaline Phosphatase 83 U/L      Total Protein 7 2 g/dL      Albumin 2 6 g/dL      Total Bilirubin 0 93 mg/dL      eGFR 32 ml/min/1 73sq m     Narrative:       [place] guidelines for Chronic Kidney Disease (CKD):     Stage 1 with normal or high GFR (GFR > 90 mL/min/1 73 square meters)    Stage 2 Mild CKD (GFR = 60-89 mL/min/1 73 square meters)    Stage 3A Moderate CKD (GFR = 45-59 mL/min/1 73 square meters)    Stage 3B Moderate CKD (GFR = 30-44 mL/min/1 73 square meters)    Stage 4 Severe CKD (GFR = 15-29 mL/min/1 73 square meters)    Stage 5 End Stage CKD (GFR <15 mL/min/1 73 square meters)  Note: GFR calculation is accurate only with a steady state creatinine    POCT urinalysis dipstick [[id number]]     Lab Status:  No result Specimen:  Urine                  XR chest 2 views   Final Result by Julisa Lancaster MD (01/07 1330)      Mildly increased interstitial markings and a small left pleural effusion may indicate recurrent CHF/pulmonary edema  The study was marked in [facility] for immediate notification        Workstation performed: [id number]                    Procedures  ECG 12 Lead Documentation Only  Date/Time: 1/7/2020 1:30 PM  Performed by: Karli Lynn MD  Authorized by: Karli Lynn MD     Indications / Diagnosis:  Fatigue, CHF  ECG reviewed by me, the ED Provider: yes    Patient location:  ED  Interpretation:     Interpretation: normal    Rate:     ECG rate assessment: normal    Rhythm:     Rhythm: paced    Ectopy:     Ectopy: none    QRS: QRS axis:  Normal  Conduction:     Conduction: normal    ST segments:     ST segments:  Normal  T waves:     T waves: normal    CriticalCare Time  Performed by: China Cisse MD  Authorized by: Cihna Cisse MD     Critical care provider statement:     Critical care time (minutes):  30    Critical care time was exclusive of:  Separately billable procedures and treating other patients and teaching time    Critical care was necessary to treat or prevent imminent or life-threatening deterioration of the following conditions:  Respiratory failure (CHF, Hypoxia requiring Oxygen)    Critical care was time spent personally by me on the following activities:  Blood draw for specimens, obtaining history from patient or surrogate, development of treatment plan with patient or surrogate, discussions with consultants, evaluation of patient's response to treatment, examination of patient, interpretation of cardiac output measurements, ordering and performing treatments and interventions, ordering and review of laboratory studies, ordering and review of radiographic studies, re-evaluation of patient's condition and review of old charts    I assumed direction of critical care for this patient from another provider in my specialty: no               ED Course  ED Course as of Jan 07 1335   Tue Jan 07, 2020   1242 WBC(!): 20 41   1244 Hemoglobin(!): 9 3   1244 Sodium(!): 132   1244 Anion Gap(!): 15   1244 BUN(!): 51   1244 Creatinine(!): 2 08   1244 NT-proBNP(!): 4,519   1244 Labs reviewed, WBC, Trop, BNP, ELZBIETA noted  Troponin I(!): 0 22   1303 Case discussed with Dr Nay Sterling, Cardiology, probable Infectious vs CHF, would see in consult                                    MDM  Number of Diagnoses or Management Options  ELZBIETA (acute kidney injury) [facility]): new and requires workup  Chills: new and requires workup  Elevated brain natriuretic peptide (BNP) level: new and requires workup  Elevated troponin: new and requires workup  Fatigue: new and requires workup  Leucocytosis: new and requires workup  Sore throat: new and requires workup  Diagnosis management comments: Patient is a 24-year-old male, history of diabetes, hypertension, congestive heart failure, atrial fibrillation, on Eliquis, comes in with complaints of fatigue, URI symptoms, sore throat, going on for 2 days, denies shortness of breath or chest pain, no fever or cough/phlem; no known sick contacts  On exam patient is conscious, alert, vital signs noted for low oxygen saturation, patient states that his oxygen saturation usually runs around 90%, not on home oxygen; HEENT exam does not show any posterior pharyngeal exudates, no uvular deviation, no peritonsillar swelling; lung exam shows diminished air entry bilaterally at bases, peripheral edema noted 2+; heart sounds irregular, patient has known cardiac murmur; abdomen soft nontender  Differential diagnosis:  Nonspecific fatigue his symptoms, URI, Flu, Pharyngitis, congestive heart failure exacerbation  Will check labs, urine, Flu/Strep swabs, EKG, chest x-ray, BNP, troponin         Amount and/or Complexity of Data Reviewed  Clinical lab tests: reviewed and ordered  Tests in the radiology section of CPT®: ordered and reviewed  Tests in the medicine section of CPT®: ordered and reviewed  Discuss the patient with other providers: yes  Independent visualization of images, tracings, or specimens: yes          Disposition  Final diagnoses:   Fatigue   Chills   Leucocytosis   ELZBIETA (acute kidney injury) ([facility] [address] )   Elevated brain natriuretic peptide (BNP) level   Elevated troponin   Sore throat     Time reflects when diagnosis was documented in both MDM as applicable and the Disposition within this note     Time User Action Codes Description Comment    1/7/2020 11:53 AM Kyra Alberto Add [R53 83] Fatigue     1/7/2020  1:16 PM Юлия Macias [R68 83] Chills     1/7/2020  1:17 PM Kyra Gan [D72 829] Leucocytosis 1/7/2020  1:17 PM Vu Dole Add [N17 9] ELZBIETA (acute kidney injury) ([facility] [address] )     1/7/2020  1:17 PM Vu Dole Add [R79 89] Elevated brain natriuretic peptide (BNP) level     1/7/2020  1:18 PM Vu Dole Add [R79 89] Elevated troponin     1/7/2020  1:33 PM Vu Dole Add [J02 9] Sore throat       ED Disposition     ED Disposition Condition Date/Time Comment    Admit Stable Tue Jan 7, 2020  1:18 PM Case was discussed with Andree and the patient's admission status was agreed to be Admission Status: inpatient status to the service of Dr Asmita Hui  Follow-up Information    None         Patient's Medications   Discharge Prescriptions    No medications on file     No discharge procedures on file      ED Provider  Electronically Signed by           Esperanza Frank MD  01/07/20 [id number]

## 2020-01-07 NOTE — H&P
H&P- Bi Knee 1955, 75 y o  male MRN: [id number]    Unit/Bed#: ED 15 Encounter: [id number]    Primary Care Provider: Azael Tamayo DO   Date and time admitted to hospital: 1/7/2020 11:29 AM        * Acute respiratory failure with hypoxia (HCC)  Assessment & Plan  Pt w/o sob but worsening hypoxia despite 5-6L NC in 82-88% former smoker but no diagnosed copd  Suspect 2* occult pneumonia +/- mild CHF, although no other s/sx of active volume overload  cxr concerning for small effusion/interstitial edema but no orthopnea/pnd  Flu negative and strep a pcr negative  As pt feels ill, w/rigors, anorexia and purulent retropharyngeal erythema suspect occult pna  Check dedicated ct chest no contrast, legionella/strep pneumo antigen  Start rocephin/azithromax, f/u bcx, procalcitonin    Acute renal failure superimposed on stage 3 chronic kidney disease ([facility] [address] )  Assessment & Plan  Suspected elzbieta on ckd  ELZBIETA may be due to illness and poor oral intake  Chronically suspect diabetic nephropathy vs htn nephrosclerosis  Appears intravascularly depleted but with cxr and LE edema there is concern for possible volume overload  Baseline estimated at 1 4-1 7  On admission 2 08  Will hold metformin/cozaar  CXR concerning for possible volume overload however has no worsening orthopnea/pnd and no worsening LE edema  Check ua w/microscopy  F/u ct for assessment of occult pneumonia for assessment of fluid status  Will trial IV lasix 40mg x 1  Elevated troponin I level  Assessment & Plan  W/o cp  Suspect type 2 MI from hypoxia vs nonspecific troponin elevation w/underlying CHF and ELZBIETA  ekg stable and paced  Monitor troponins for completeness will consult cardiology    Chronic diastolic (congestive) heart failure [facility])  Assessment & Plan  Wt Readings from Last 3 Encounters:   01/07/20 97 2 kg (214 lb 4 6 oz)   12/13/19 96 2 kg (212 lb)   12/03/19 97 5 kg (215 lb)       W/possible acute on chronic diastolic CHF    cxr concerning for effusion volume overload however no worsening PND/orthopnea or LE edema with higher suspicion for occult pneumonia  nt pro bnp is elevated at 4500  Check dedicated ct chest  ELBERT w/LVEF 60% but moderate to severe mitral stenosis  Will trial IV lasix 40mg x 1, continue bb, hold arb  Will appreciate cardiology recommendations      Leukocytosis  Assessment & Plan  With suspected impending sepsis  Neutrophilic  Will give rocephin/azithromax  F/u bcx, procalcitonin, monitor cbc vs closely    Essential hypertension  Assessment & Plan  Hold cozaar/ continue metoprolol    Type 2 diabetes mellitus with chronic kidney disease, without long-term current use of insulin ([facility])  Assessment & Plan  Lab Results   Component Value Date    HGBA1C 6 1 09/19/2019       No results for input(s): POCGLU in the last 72 hours  Blood Sugar Average: Last 72 hrs:   iddm on insulin degludec-liraglutide 18 iu daily  Will transition to lantus at 9 iu qhs and start ssi/poc bs given poor appetite    NICOLASA (obstructive sleep apnea)  Assessment & Plan  Continue cpap qhs    PAF (paroxysmal atrial fibrillation) ([facility])  Assessment & Plan  Rate controlled and paced rhythm  Continue lopressor/eliquis    Complete heart block ([facility])  Assessment & Plan  S/p ppm    Eczema  Assessment & Plan  Stable discrete plaques over back w/silver scaling  Pruritic non painful  Continue op dupixent twice weekly upon d/c  Op f/u with derm    Erythema   Of LLE at ankle mortise  possible stasis dermatitis  Warm but not painful and no fluctuance/induration   Monitor w/skin marker      VTE Prophylaxis: Apixaban (Eliquis)  / sequential compression device   Code Status:    POLST: There is no POLST form on file for this patient (pre-hospital)  Discussion with family: dispo workup w/wife    Anticipated Length of Stay:  Patient will be admitted on an Inpatient basis with an anticipated length of stay of  Greater than 2 midnights     Justification for Hospital Stay: acute respiratory failure    Total Time for Visit, including Counseling / Coordination of Care: 45 minutes  Greater than 50% of this total time spent on direct patient counseling and coordination of care  Chief Complaint:   Cough sore throat, malaise x 3d    History of Present Illness:    Job Le is a 75 y o  male who presents with pmh of Chronic diastolic CHF, afib on eliquis, heart block s/p ppm, sue, IDDM, htn and ckd stage 3 coming to hospital for sore throat cough and malaise x2-3 days  Pt reports he had acute onset chills/rigors/anorexia 3 days pta  He developed dry nonproductive cough  He has mild sore throat  He did have 3 loose bms, nausea but no vomiting  No abd pain  Pt has no worsening LE edema, orthopnea, pnd, sob/cp  He has stable eczema including rashes on his back but no new rashes, myalgias, arthralgias, neck pain/head pain  He did travel to [place] and stayed in a hotel with his wife/daughter but he has no sick contacts and they are well  He did receive his flu vaccine this year  He finished a course of ceftin in the beginning of December for sinus infection and finished his abx with resolution of s/sx  In the ED he was assessed for strep a by pcr as well as influenza which was negative  He developed acute hypoxia which was asymptomatic requiring 5L on simple mask due to progressive worsening hypoxia  cxr was concerning for possible volume overload  We are asked to admit pt for URI, profound leukocytosis, elevated troponin and acute respiratory failure  Review of Systems:    Review of Systems   Constitutional: Positive for appetite change, chills and fatigue  HENT: Positive for sore throat  Negative for congestion, ear pain, sinus pressure, sinus pain, trouble swallowing and voice change  Respiratory: Positive for apnea and cough  Negative for shortness of breath  No orthopnea worsening LE edema or PND   Cardiovascular: Negative for chest pain  Gastrointestinal: Positive for diarrhea and nausea  Negative for abdominal pain and vomiting  Musculoskeletal: Negative for arthralgias, myalgias, neck pain and neck stiffness  Skin: Positive for rash  Neurological: Negative for headaches  All other systems reviewed and are negative  Past Medical and Surgical History:     Past Medical History:   Diagnosis Date    Arthritis     OA    Bruise of both arms     forearms and both hands    Bruises easily     CHF [facility]congestive heart failure) ([facility] [address] )     Diabetes mellitus ([facility] [address] )     Diabetic foot ulcer ([facility] [address] )     Eczema     Erectile dysfunction     Gout     Hyperlipidemia     Hypertension     Murmur     Nephropathy     Osteoarthritis     [facility]     Seasonal allergies     Toe infection     bilat great toes    Vertigo     Walks frequently     Wears dentures     upper    Wears glasses        Past Surgical History:   Procedure Laterality Date    CARDIAC PACEMAKER PLACEMENT      CARPAL TUNNEL RELEASE Left     CARPAL TUNNEL RELEASE Right     CARPAL TUNNEL RELEASE      KNEE ARTHROSCOPY Left     KNEE ARTHROSCOPY Right     KNEE SURGERY      TX AMPUTATION TOE,I-P JT Bilateral 5/2/2017    Procedure: PARTIAL AMPUTATION RIGHT AND LEFT HALLUX ;  Surgeon: Nora Chapman DPM;  Location: [facility];  Service: Podiatry    TX AMPUTATION TOE,MT-P JT Left 7/25/2017    Procedure: 2ND TOE AMPUTATION;  Surgeon: Nora Chapman DPM;  Location: [facility];  Service: Podiatry    SHOULDER ARTHROSCOPY Left     with screws,RTC    SHOULDER SURGERY      VASECTOMY         Meds/Allergies:    Prior to Admission medications    Medication Sig Start Date End Date Taking?  Authorizing Provider   allopurinol (ZYLOPRIM) 300 mg tablet TAKE 1 TABLET (300 MG TOTAL) BY MOUTH EVERY MORNING 2/9/19   Clarissa Mckinney DO   amLODIPine (NORVASC) 10 mg tablet TAKE 1 TABLET BY MOUTH EVERY DAY 12/22/19   Kera Wolf MD   apixaban (ELIQUIS) 5 mg Take 1 tablet (5 mg total) by mouth every 12 (twelve) hours 9/25/19   Monica Dutton MD   betamethasone valerate (VALISONE) 0 1 % cream APPLY TO BACK,ARMS,CHEST,LEGS AND ABDOMEN TWICE A DAY 10/1/18   Historical Provider, MD   Cholecalciferol (VITAMIN D-3) 1000 units CAPS Take 1 capsule by mouth every morning      Historical Provider, MD   Dupilumab, Asthma, ([facility]) Inject under the skin    Historical Provider, MD   econazole nitrate 1 % cream Apply 1 application topically 2 (two) times a day To affected area 8/22/19   Historical Provider, MD   furosemide (LASIX) 40 mg tablet Take 1 tablet (40 mg total) by mouth 2 (two) times a day 11/27/19   Monica Dutton MD   glucagon (GLUCAGON EMERGENCY) 1 MG injection Inject 1 mg under the skin once as needed for low blood sugar for up to 1 dose 3/8/19   PAT Sood   glucose blood (ONE TOUCH ULTRA TEST) test strip 1 each by Other route 3 (three) times a day Use to test blood sugar 6/20/19   PAT Sood   hydrOXYzine HCL (ATARAX) 10 mg tablet Take 10 mg by mouth every 6 (six) hours as needed   10/24/18   Historical Provider, MD   Insulin Degludec-Liraglutide (XULTOPHY) 100 units-3 6 mg/mL injection pen Inject 16 Units under the skin daily for 30 days  Patient taking differently: Inject 18 Units under the skin daily  6/20/19 12/3/19  PAT Sood   losartan (COZAAR) 50 mg tablet Take 0 5 tablets (25 mg total) by mouth daily 5/23/19   Monica Dutton MD   metFORMIN (GLUCOPHAGE) 500 mg tablet Take 1 tablet (500 mg total) by mouth 2 (two) times a day with meals [id number] /twice a day 6/20/19   PAT Sood   metolazone (ZAROXOLYN) 5 mg tablet Take 1 tablet (5 mg total) by mouth see administration instructions One tablet every 6 days prn and as directed by physician 5/23/19   Monica Dutton MD   metoprolol tartrate (LOPRESSOR) 50 mg tablet Take 1 tablet (50 mg total) by mouth every 12 (twelve) hours 11/27/18   Monica Dutton MD   Multiple Vitamin (MULTIVITAMIN) tablet Take 1 tablet by mouth daily IN AM    Historical Provider, MD   omega-3-acid ethyl esters (LOVAZA) 1 g capsule Take 2 capsules (2 g total) by mouth 2 (two) times a day 12/5/18   Kana Babcock MD   ONE TOUCH ULTRA TEST test strip USE TO TEST BLOOD SUGAR 3 TIMES A DAY 10/31/19   Jarrett Cansa DO   simvastatin (ZOCOR) 20 mg tablet Take 1 tablet (20 mg total) by mouth daily at bedtime 11/27/18   Vern Osborne MD   [facility]) 0 4 mg Take 0 4 mg by mouth daily with dinner 9/25/19   Historical Provider, MD   glimepiride (AMARYL) 4 mg tablet TAKE 1 TABLET BY MOUTH TWICE A DAY  Patient taking differently: TAKE 1 TABLET BY MOUTH IN THE AM AND 1/2 TABLET IN PM 10/30/18 5/24/19  Donis Marsh DO     I have reviewed home medications with patient personally  Allergies: Allergies   Allergen Reactions    Invokana [Canagliflozin]     Lamisil [Terbinafine] Rash    Lamisil [Terbinafine] Blisters     Wife states " His skin peeled from head to toe"    Other Swelling     Pomegranate - facial swelling, no swelling of tongue, esophagus  Adhesive tape        Latex Rash       Social History:     Marital Status: /Civil Union   Occupation:   Patient Pre-hospital Living Situation:   Patient Pre-hospital Level of Mobility:   Patient Pre-hospital Diet Restrictions:   Substance Use History:   Social History     Substance and Sexual Activity   Alcohol Use Never    Frequency: Never     Social History     Tobacco Use   Smoking Status Former Smoker    Packs/day: 1 00    Years: 30 00    Pack years: 30 00    Types: Cigarettes   Smokeless Tobacco Never Used   Tobacco Comment    quit 10 years ago     Social History     Substance and Sexual Activity   Drug Use Never       Family History:    Family History   Problem Relation Age of Onset    Heart disease Father     No Known Problems Mother     Hypertension Father     Pulmonary embolism Father        Physical Exam:     Vitals:   Blood Pressure: 128/58 (01/07/20 1312)  Pulse: 76 (01/07/20 1118)  Temperature: 99 1 °F (37 3 °C) (01/07/20 1118)  Temp Source: Temporal (01/07/20 1118)  Respirations: 15 (01/07/20 1312)  Weight - Scale: 97 2 kg (214 lb 4 6 oz) (01/07/20 1117)  SpO2: (!) 85 % (01/07/20 1312)    Physical Exam    (  Be Sure to Include Physical Exam: Delete this entire line when you have entered your exam)    Additional Data:     Lab Results: I have personally reviewed pertinent reports  Results from last 7 days   Lab Units 01/07/20  1207   WBC Thousand/uL 20 41*   HEMOGLOBIN g/dL 9 3*   HEMATOCRIT % 27 8*   PLATELETS Thousands/uL 240   BANDS PCT % 5   LYMPHO PCT % 1*   MONO PCT % 3*   EOS PCT % 0     Results from last 7 days   Lab Units 01/07/20  1207   SODIUM mmol/L 132*   POTASSIUM mmol/L 4 3   CHLORIDE mmol/L 96*   CO2 mmol/L 21   BUN mg/dL 51*   CREATININE mg/dL 2 08*   ANION GAP mmol/L 15*   CALCIUM mg/dL 8 7   ALBUMIN g/dL 2 6*   TOTAL BILIRUBIN mg/dL 0 93   ALK PHOS U/L 83   ALT U/L 24   AST U/L 24   GLUCOSE RANDOM mg/dL 208*     Results from last 7 days   Lab Units 01/07/20  1207   INR  1 89*                   Imaging: I have personally reviewed pertinent reports   , I have personally reviewed pertinent films in PACS and cxr reviewed by myself  suspected effusion on lateral view,  no significant cephalazation noted    XR chest 2 views   Final Result by Gavi Taylor MD (01/07 1330)      Mildly increased interstitial markings and a small left pleural effusion may indicate recurrent CHF/pulmonary edema  The study was marked in [facility] for immediate notification  Workstation performed: [id number]         CT chest wo contrast    (Results Pending)       EKG, Pathology, and Other Studies Reviewed on Admission:   · EKG: paced rhythm    Allscripts / Epic Records Reviewed: Yes     ** Please Note: This note has been constructed using a voice recognition system   **

## 2020-01-07 NOTE — ASSESSMENT & PLAN NOTE
Stable discrete plaques over back w/silver scaling    Pruritic non painful  Continue op dupixent twice weekly upon d/c  Op f/u with derm

## 2020-01-07 NOTE — ASSESSMENT & PLAN NOTE
W/o cp  Suspect type 2 MI from hypoxia vs nonspecific troponin elevation w/underlying CHF and ELZBIETA  ekg stable and paced    Monitor troponins for completeness will consult cardiology

## 2020-01-08 ENCOUNTER — ANESTHESIA EVENT (INPATIENT)
Dept: PERIOP | Facility: HOSPITAL | Age: 65
DRG: 853 | End: 2020-01-08
Payer: MEDICARE

## 2020-01-08 ENCOUNTER — APPOINTMENT (INPATIENT)
Dept: RADIOLOGY | Facility: HOSPITAL | Age: 65
DRG: 853 | End: 2020-01-08
Payer: MEDICARE

## 2020-01-08 ENCOUNTER — APPOINTMENT (INPATIENT)
Dept: NON INVASIVE DIAGNOSTICS | Facility: HOSPITAL | Age: 65
DRG: 853 | End: 2020-01-08
Payer: MEDICARE

## 2020-01-08 ENCOUNTER — ANESTHESIA (INPATIENT)
Dept: PERIOP | Facility: HOSPITAL | Age: 65
DRG: 853 | End: 2020-01-08
Payer: MEDICARE

## 2020-01-08 DIAGNOSIS — Z71.89 COMPLEX CARE COORDINATION: Primary | ICD-10-CM

## 2020-01-08 PROBLEM — M86.9 PYOGENIC INFLAMMATION OF BONE (HCC): Status: ACTIVE | Noted: 2020-01-07

## 2020-01-08 PROBLEM — E11.40 TYPE 2 DIABETES MELLITUS WITH DIABETIC NEUROPATHY, WITHOUT LONG-TERM CURRENT USE OF INSULIN (HCC): Status: ACTIVE | Noted: 2020-01-07

## 2020-01-08 PROBLEM — R78.81 BACTEREMIA DUE TO GRAM-POSITIVE BACTERIA: Status: ACTIVE | Noted: 2020-01-08

## 2020-01-08 LAB
ANION GAP SERPL CALCULATED.3IONS-SCNC: 11 MMOL/L (ref 4–13)
BASOPHILS # BLD AUTO: 0.05 THOUSANDS/ΜL (ref 0–0.1)
BASOPHILS NFR BLD AUTO: 0 % (ref 0–1)
BUN SERPL-MCNC: 61 MG/DL (ref 5–25)
CALCIUM SERPL-MCNC: 8.3 MG/DL (ref 8.3–10.1)
CHLORIDE SERPL-SCNC: 97 MMOL/L (ref 100–108)
CO2 SERPL-SCNC: 22 MMOL/L (ref 21–32)
CREAT SERPL-MCNC: 2.42 MG/DL (ref 0.6–1.3)
EOSINOPHIL # BLD AUTO: 0 THOUSAND/ΜL (ref 0–0.61)
EOSINOPHIL NFR BLD AUTO: 0 % (ref 0–6)
ERYTHROCYTE [DISTWIDTH] IN BLOOD BY AUTOMATED COUNT: 14.5 % (ref 11.6–15.1)
GFR SERPL CREATININE-BSD FRML MDRD: 27 ML/MIN/1.73SQ M
GLUCOSE SERPL-MCNC: 140 MG/DL (ref 65–140)
GLUCOSE SERPL-MCNC: 148 MG/DL (ref 65–140)
GLUCOSE SERPL-MCNC: 152 MG/DL (ref 65–140)
GLUCOSE SERPL-MCNC: 160 MG/DL (ref 65–140)
GLUCOSE SERPL-MCNC: 223 MG/DL (ref 65–140)
HCT VFR BLD AUTO: 25.3 % (ref 36.5–49.3)
HGB BLD-MCNC: 8.4 G/DL (ref 12–17)
IMM GRANULOCYTES # BLD AUTO: 0.09 THOUSAND/UL (ref 0–0.2)
IMM GRANULOCYTES NFR BLD AUTO: 1 % (ref 0–2)
L PNEUMO1 AG UR QL IA.RAPID: NEGATIVE
LYMPHOCYTES # BLD AUTO: 1.07 THOUSANDS/ΜL (ref 0.6–4.47)
LYMPHOCYTES NFR BLD AUTO: 6 % (ref 14–44)
MCH RBC QN AUTO: 30.9 PG (ref 26.8–34.3)
MCHC RBC AUTO-ENTMCNC: 33.2 G/DL (ref 31.4–37.4)
MCV RBC AUTO: 93 FL (ref 82–98)
MONOCYTES # BLD AUTO: 1.37 THOUSAND/ΜL (ref 0.17–1.22)
MONOCYTES NFR BLD AUTO: 8 % (ref 4–12)
NEUTROPHILS # BLD AUTO: 14.51 THOUSANDS/ΜL (ref 1.85–7.62)
NEUTS SEG NFR BLD AUTO: 85 % (ref 43–75)
NRBC BLD AUTO-RTO: 0 /100 WBCS
PLATELET # BLD AUTO: 232 THOUSANDS/UL (ref 149–390)
PMV BLD AUTO: 9.3 FL (ref 8.9–12.7)
POTASSIUM SERPL-SCNC: 4.4 MMOL/L (ref 3.5–5.3)
PROCALCITONIN SERPL-MCNC: 1.88 NG/ML
RBC # BLD AUTO: 2.72 MILLION/UL (ref 3.88–5.62)
S PNEUM AG UR QL: NEGATIVE
SODIUM SERPL-SCNC: 130 MMOL/L (ref 136–145)
TROPONIN I SERPL-MCNC: 0.65 NG/ML
WBC # BLD AUTO: 17.09 THOUSAND/UL (ref 4.31–10.16)

## 2020-01-08 PROCEDURE — 85025 COMPLETE CBC W/AUTO DIFF WBC: CPT | Performed by: PHYSICIAN ASSISTANT

## 2020-01-08 PROCEDURE — 99223 1ST HOSP IP/OBS HIGH 75: CPT | Performed by: INTERNAL MEDICINE

## 2020-01-08 PROCEDURE — 87147 CULTURE TYPE IMMUNOLOGIC: CPT | Performed by: PODIATRIST

## 2020-01-08 PROCEDURE — 84484 ASSAY OF TROPONIN QUANT: CPT | Performed by: PHYSICIAN ASSISTANT

## 2020-01-08 PROCEDURE — 88304 TISSUE EXAM BY PATHOLOGIST: CPT | Performed by: PATHOLOGY

## 2020-01-08 PROCEDURE — 73630 X-RAY EXAM OF FOOT: CPT

## 2020-01-08 PROCEDURE — 0Y6Q0Z1 DETACHMENT AT LEFT 1ST TOE, HIGH, OPEN APPROACH: ICD-10-PCS | Performed by: PODIATRIST

## 2020-01-08 PROCEDURE — 87075 CULTR BACTERIA EXCEPT BLOOD: CPT | Performed by: PODIATRIST

## 2020-01-08 PROCEDURE — 87176 TISSUE HOMOGENIZATION CULTR: CPT | Performed by: PODIATRIST

## 2020-01-08 PROCEDURE — 99232 SBSQ HOSP IP/OBS MODERATE 35: CPT | Performed by: INTERNAL MEDICINE

## 2020-01-08 PROCEDURE — 84145 PROCALCITONIN (PCT): CPT | Performed by: PHYSICIAN ASSISTANT

## 2020-01-08 PROCEDURE — 87449 NOS EACH ORGANISM AG IA: CPT | Performed by: PHYSICIAN ASSISTANT

## 2020-01-08 PROCEDURE — 88311 DECALCIFY TISSUE: CPT | Performed by: PATHOLOGY

## 2020-01-08 PROCEDURE — 82948 REAGENT STRIP/BLOOD GLUCOSE: CPT

## 2020-01-08 PROCEDURE — 94760 N-INVAS EAR/PLS OXIMETRY 1: CPT

## 2020-01-08 PROCEDURE — 99232 SBSQ HOSP IP/OBS MODERATE 35: CPT | Performed by: PHYSICIAN ASSISTANT

## 2020-01-08 PROCEDURE — 94762 N-INVAS EAR/PLS OXIMTRY CONT: CPT

## 2020-01-08 PROCEDURE — 87070 CULTURE OTHR SPECIMN AEROBIC: CPT | Performed by: PODIATRIST

## 2020-01-08 PROCEDURE — 94660 CPAP INITIATION&MGMT: CPT

## 2020-01-08 PROCEDURE — 0JBR0ZZ EXCISION OF LEFT FOOT SUBCUTANEOUS TISSUE AND FASCIA, OPEN APPROACH: ICD-10-PCS | Performed by: PODIATRIST

## 2020-01-08 PROCEDURE — 87205 SMEAR GRAM STAIN: CPT | Performed by: PODIATRIST

## 2020-01-08 PROCEDURE — 80048 BASIC METABOLIC PNL TOTAL CA: CPT | Performed by: PHYSICIAN ASSISTANT

## 2020-01-08 PROCEDURE — 87186 SC STD MICRODIL/AGAR DIL: CPT | Performed by: PODIATRIST

## 2020-01-08 RX ORDER — BUPIVACAINE HYDROCHLORIDE 5 MG/ML
INJECTION, SOLUTION EPIDURAL; INTRACAUDAL AS NEEDED
Status: DISCONTINUED | OUTPATIENT
Start: 2020-01-08 | End: 2020-01-08 | Stop reason: HOSPADM

## 2020-01-08 RX ORDER — ONDANSETRON 2 MG/ML
4 INJECTION INTRAMUSCULAR; INTRAVENOUS ONCE AS NEEDED
Status: DISCONTINUED | OUTPATIENT
Start: 2020-01-08 | End: 2020-01-08 | Stop reason: HOSPADM

## 2020-01-08 RX ORDER — MEPERIDINE HYDROCHLORIDE 50 MG/ML
12.5 INJECTION INTRAMUSCULAR; INTRAVENOUS; SUBCUTANEOUS
Status: DISCONTINUED | OUTPATIENT
Start: 2020-01-08 | End: 2020-01-08 | Stop reason: HOSPADM

## 2020-01-08 RX ORDER — PROPOFOL 10 MG/ML
INJECTION, EMULSION INTRAVENOUS AS NEEDED
Status: DISCONTINUED | OUTPATIENT
Start: 2020-01-08 | End: 2020-01-08 | Stop reason: SURG

## 2020-01-08 RX ORDER — SODIUM CHLORIDE 9 MG/ML
INJECTION, SOLUTION INTRAVENOUS AS NEEDED
Status: DISCONTINUED | OUTPATIENT
Start: 2020-01-08 | End: 2020-01-08 | Stop reason: HOSPADM

## 2020-01-08 RX ORDER — EPHEDRINE SULFATE 50 MG/ML
INJECTION INTRAVENOUS AS NEEDED
Status: DISCONTINUED | OUTPATIENT
Start: 2020-01-08 | End: 2020-01-08 | Stop reason: SURG

## 2020-01-08 RX ORDER — SODIUM CHLORIDE 9 MG/ML
100 INJECTION, SOLUTION INTRAVENOUS CONTINUOUS
Status: DISCONTINUED | OUTPATIENT
Start: 2020-01-08 | End: 2020-01-10

## 2020-01-08 RX ORDER — FENTANYL CITRATE 50 UG/ML
INJECTION, SOLUTION INTRAMUSCULAR; INTRAVENOUS AS NEEDED
Status: DISCONTINUED | OUTPATIENT
Start: 2020-01-08 | End: 2020-01-08 | Stop reason: SURG

## 2020-01-08 RX ORDER — OXYCODONE HYDROCHLORIDE 5 MG/1
5 TABLET ORAL EVERY 4 HOURS PRN
Status: DISCONTINUED | OUTPATIENT
Start: 2020-01-08 | End: 2020-01-29 | Stop reason: HOSPADM

## 2020-01-08 RX ORDER — LIDOCAINE HYDROCHLORIDE 10 MG/ML
INJECTION, SOLUTION EPIDURAL; INFILTRATION; INTRACAUDAL; PERINEURAL AS NEEDED
Status: DISCONTINUED | OUTPATIENT
Start: 2020-01-08 | End: 2020-01-08 | Stop reason: HOSPADM

## 2020-01-08 RX ORDER — HYDROMORPHONE HCL/PF 1 MG/ML
0.5 SYRINGE (ML) INJECTION
Status: DISCONTINUED | OUTPATIENT
Start: 2020-01-08 | End: 2020-01-08 | Stop reason: HOSPADM

## 2020-01-08 RX ORDER — FENTANYL CITRATE/PF 50 MCG/ML
25 SYRINGE (ML) INJECTION
Status: DISCONTINUED | OUTPATIENT
Start: 2020-01-08 | End: 2020-01-08 | Stop reason: HOSPADM

## 2020-01-08 RX ORDER — ONDANSETRON 2 MG/ML
INJECTION INTRAMUSCULAR; INTRAVENOUS AS NEEDED
Status: DISCONTINUED | OUTPATIENT
Start: 2020-01-08 | End: 2020-01-08 | Stop reason: SURG

## 2020-01-08 RX ADMIN — FENTANYL CITRATE 50 MCG: 50 INJECTION, SOLUTION INTRAMUSCULAR; INTRAVENOUS at 17:41

## 2020-01-08 RX ADMIN — METOPROLOL TARTRATE 50 MG: 50 TABLET, FILM COATED ORAL at 08:55

## 2020-01-08 RX ADMIN — PHENYLEPHRINE HYDROCHLORIDE 50 MCG: 10 INJECTION INTRAVENOUS at 18:31

## 2020-01-08 RX ADMIN — SODIUM CHLORIDE 100 ML/HR: 0.9 INJECTION, SOLUTION INTRAVENOUS at 15:23

## 2020-01-08 RX ADMIN — GUAIFENESIN 600 MG: 600 TABLET ORAL at 21:34

## 2020-01-08 RX ADMIN — MELATONIN TAB 3 MG 6 MG: 3 TAB at 21:34

## 2020-01-08 RX ADMIN — PHENYLEPHRINE HYDROCHLORIDE 50 MCG: 10 INJECTION INTRAVENOUS at 18:22

## 2020-01-08 RX ADMIN — AMLODIPINE BESYLATE 10 MG: 10 TABLET ORAL at 08:55

## 2020-01-08 RX ADMIN — LIDOCAINE HYDROCHLORIDE 50 MG: 20 INJECTION, SOLUTION INTRAVENOUS at 17:39

## 2020-01-08 RX ADMIN — INSULIN LISPRO 1 UNITS: 100 INJECTION, SOLUTION INTRAVENOUS; SUBCUTANEOUS at 08:57

## 2020-01-08 RX ADMIN — ACETAMINOPHEN 650 MG: 325 TABLET ORAL at 23:08

## 2020-01-08 RX ADMIN — ALLOPURINOL 300 MG: 100 TABLET ORAL at 08:55

## 2020-01-08 RX ADMIN — ONDANSETRON 4 MG: 2 INJECTION INTRAMUSCULAR; INTRAVENOUS at 18:22

## 2020-01-08 RX ADMIN — FENTANYL CITRATE 50 MCG: 50 INJECTION, SOLUTION INTRAMUSCULAR; INTRAVENOUS at 17:44

## 2020-01-08 RX ADMIN — ACETAMINOPHEN 650 MG: 325 TABLET ORAL at 14:14

## 2020-01-08 RX ADMIN — PROPOFOL 150 MG: 10 INJECTION, EMULSION INTRAVENOUS at 17:39

## 2020-01-08 RX ADMIN — VANCOMYCIN HYDROCHLORIDE 1500 MG: 5 INJECTION, POWDER, LYOPHILIZED, FOR SOLUTION INTRAVENOUS at 12:52

## 2020-01-08 RX ADMIN — GUAIFENESIN 600 MG: 600 TABLET ORAL at 08:55

## 2020-01-08 RX ADMIN — INSULIN GLARGINE 9 UNITS: 100 INJECTION, SOLUTION SUBCUTANEOUS at 21:34

## 2020-01-08 RX ADMIN — EPHEDRINE SULFATE 7.5 MG: 50 INJECTION, SOLUTION INTRAVENOUS at 18:07

## 2020-01-08 RX ADMIN — METOPROLOL TARTRATE 50 MG: 50 TABLET, FILM COATED ORAL at 21:34

## 2020-01-08 RX ADMIN — APIXABAN 5 MG: 5 TABLET, FILM COATED ORAL at 08:55

## 2020-01-08 NOTE — ANESTHESIA PREPROCEDURE EVALUATION
Review of Systems/Medical History          Cardiovascular  Hyperlipidemia, Hypertension on > 1 medication, Dysrhythmias , atrial fibrillation, CHF heart failure unspecified,   Comment: Transthoracic Echocardiogram       Study date:  02-May-2019       Interpreting Physician:  DO REYNA Kurtz     LEFT VENTRICLE:  Systolic function was hyperdynamic  Ejection fraction was estimated to be 70 %  Wall thickness was mildly increased      LEFT ATRIUM:  The atrium was mildly dilated      MITRAL VALVE:  There was moderate annular calcification  There was moderate stenosis (transvalvular diastolic gradient ~ 7 mmHg at HR 90 BPM)     AORTIC VALVE:  Transaortic velocity was increased due to valvular stenosis  There was very mild stenosis      PULMONIC VALVE:  Transpulmonic velocity was increased due to increased transvalvular flow      PERICARDIUM:  There was a possible left pleural effusion      HISTORY: PRIOR HISTORY: Congestive heart failure  Peripheral vascular disease  Risk factors: hypertension, diabetes, and medication-treated hypercholesterolemia  PRIOR PROCEDURES: Pacemaker implantation      PROCEDURE: The procedure was performed at the bedside  This was a routine study  The transthoracic approach was used  The study included complete 2D imaging, M-mode, complete spectral Doppler, and color Doppler  The heart rate was 95 bpm,  at the start of the study  Intravenous contrast (  8ml of Definity) was administered  Echocardiographic views were limited due to restricted patient mobility, poor acoustic window availability, decreased penetration, and lung interference  This was a technically difficult study      LEFT VENTRICLE: Size was normal  Systolic function was hyperdynamic  Ejection fraction was estimated to be 70 %   Wall thickness was mildly increased      RIGHT VENTRICLE: The size was normal  Systolic function was normal  Wall thickness was normal      LEFT ATRIUM: The atrium was mildly dilated      RIGHT ATRIUM: Size was normal  A pacing wire was present      MITRAL VALVE: There was moderate annular calcification  DOPPLER: There was moderate stenosis (transvalvular diastolic gradient ~ 7 mmHg at HR 90 BPM)     AORTIC VALVE: The valve was trileaflet  Leaflets exhibited mildly increased thickness, normal cuspal separation, and sclerosis  DOPPLER: Transaortic velocity was increased due to valvular stenosis  There was very mild stenosis  There was  no regurgitation      TRICUSPID VALVE: The valve structure was normal  There was normal leaflet separation  DOPPLER: The transtricuspid velocity was within the normal range  There was no evidence for stenosis  There was no regurgitation      PULMONIC VALVE: Not well visualized  DOPPLER: Transpulmonic velocity was increased due to increased transvalvular flow  There was no evidence for stenosis  There was no regurgitation      PERICARDIUM: There was a possible left pleural effusion      AORTA: The root exhibited normal size      SYSTEMIC VEINS: IVC: The inferior vena cava was normal in size and course   Respirophasic changes were normal      PULMONARY ARTERY: DOPPLER: The tricuspid jet envelope definition was inadequate for estimation of RV systolic pressure ,  Pulmonary  Sleep apnea CPAP,        GI/Hepatic  Negative GI/hepatic ROS          Chronic kidney disease stage 3 and stage 4,   Comment: CKD stage 3B-4 GFR 27-30s     Endo/Other  Diabetes well controlled type 2 Oral agent,      GYN       Hematology  Anemia ,    Comment: Hgb 8 4 Musculoskeletal    Arthritis     Neurology  Negative neurology ROS      Psychology   Negative psychology ROS              Physical Exam    Airway    Mallampati score: II         Dental   upper dentures,     Cardiovascular  Rhythm: regular, Rate: normal, Murmur, Cardiovascular exam normal    Pulmonary  Pulmonary exam normal Breath sounds clear to auscultation,     Other Findings        Anesthesia Plan  ASA Score- 4 Anesthesia Type- general with ASA Monitors  Additional Monitors:   Airway Plan: ETT and LMA  Comment: Patient Febrile 102 5  GA probably OK for LMA  Plan Factors-    Induction-     Postoperative Plan- Plan for postoperative opioid use  Informed Consent- Anesthetic plan and risks discussed with patient

## 2020-01-08 NOTE — CONSULTS
Consultation - Infectious Disease   Edgardo Arce 75 y o  male MRN: [id number]  Unit/Bed#: E4 -01 Encounter: [id number]    IMPRESSION & RECOMMENDATIONS:   1  Sepsis  POA  Fever and leukocytosis  Likely secondary to gram-positive bacteremia, suspect left great toe wound is the source  Consider possible respiratory source given his hypoxia, however his chest imaging is indicative of pulmonary edema and not a bacterial process  No other clear source appreciated  He has no GI or  complaints  Despite being systemically ill he has been hemodynamically stable  This morning his fever curve and WBC count are trending down   -antibiotic as below  -check CBC and BMP tomorrow  -follow up blood cultures  -check repeat blood cultures tomorrow am  -monitor vitals  -supportive care    2  Gram-positive bacteremia  Showing in both sets of patient's initial blood cultures  Suspect patient's left great toe ulcer is the source  Patient does have an implanted pacemaker and should pursue a TTE/ELBERT to assess for valvular vegetation  He has been receiving ceftriaxone and azithromycin for his antibiotic regimen but given these findings he will be transitioned IV vancomycin now  Will consult pharmacy for guidance on vancomycin dosage  Will check repeat blood cultures tomorrow am   -stop IV ceftriaxone  -stop PO azithromycin  -check CBC and BMP tomorrow  -follow up blood cultures  -check repeat blood cultures tomorrow am  -check TTE/ELBERT  -monitor vitals    3  Left hallux ulceration  With presumed clinical osteomyelitis as his wound does probe to bone  Purulence noted during podiatry wound exam, concern for possible infectious tenosynovitis  I suspect this is the source of patient's bacteremia above  Podiatry has ordered an x-ray of the left foot for additional assessment    He is tentatively scheduled to go to the OR [facility] for a left hallux amputation   -antibiotic as above  -check CBC tomorrow  -follow up left foot x-ray  -serial left foot exams  -local wound care per Podiatry  -continue close follow-up with Podiatry    4  Acute hypoxic respiratory failure  Suspect this is secondary to pulmonary edema and diastolic heart failure  BNP and troponin were elevated upon admission  I personally reviewed his chest x-ray and CT of the chest which did not show consolidations indicative of a bacterial process  Patient did initially require high-flow O2  Now his O2 saturation is stable on nasal cannula oxygen  He is following closely with Cardiology and is receiving diuresis  -diuresis per Cardiology  -monitor vitals  -monitor respiratory status  -continue close follow-up with Cardiology    5  Type 2 diabetes mellitus with neuropathy  Patient's last hemoglobin A1c was 6 4% on 01/07/2020  This is risk factor for wounds and infection  Recommend tight glycemic control for overall health, especially in the setting of wound infection   -blood glucose management per primary service    6  Chronic diastolic heart failure  In setting of aortic and mitral valve stenosis and complete heart block  He has a [employer] dual chamber pacemaker in place  Now with Gram-positive bacteremia, patient have TTE/ELBERT for further assessment for endocarditis/pacer infection  He is following closely with Cardiology   -check TTE/ELBERT  -continue follow-up with Cardiology    7  Paroxysmal AFib  On Eliquis  Thank you for the consult  We will continue to follow along  Above plan was discussed in detail with patient and his wife at the bedside  Above plan was discussed in detail with SLIM attending, Dr Dalton Gomez  Above plan was discussed in detail with Podiatry resident, Dr Sanjiv Barraza  HISTORY OF PRESENT ILLNESS:  Reason for Consult:  Gram-positive bacteremia  HPI: Jennifer Reardon is a 75y o  year old male who [facility] [place] [facility] [place] with fatigue and malaise for 2-3 days    He reports he has experienced some fevers, chills, and rigors  Also has had a poor appetite  He has an occasional nonproductive cough and a slight sore throat  He cannot identify any recent sick contacts  Upon arrival to the ED the patient's temperature was 99 1° with a heart rate of 76  His temperature quickly escalated to 103 6  His WBC count was 20 41  His creatinine was 2 08 with a GFR of 32  Blood cultures were sent  A flu/RSV PCR was sent  BNP and troponin were elevated  He was taken for chest x-ray which was concerning for pulmonary edema  The patient was started on ceftriaxone and azithromycin  He was admitted for additional medical management  After his admission his antibiotic regimen was continued with ceftriaxone and azithromycin  He was taken for a follow-up chest CT which continued to show pulmonary edema  He temporarily required high-flow O2  His procalcitonin was checked and was found to be elevated at 0 67  Cardiology was consulted and are following the patient closely  Patient reported that he has a chronic wound on his left great toe which has become inflamed and reddened  Podiatry has assessed the patient and found that the wound probes to bone  Purulence was expressed during the exam   He will require amputation of the hallux which is tentatively scheduled for tonight  Patient's blood cultures are now showing gram-positive cocci in clusters in both sets  We have been asked for formal consult for Gram-positive bacteremia      The patient has a past medical and surgical history significant for congestive heart failure, mitral valve stenosis, dentures, vertigo, glasses, rectal dysfunction, gout, osteoarthritis, type 2 diabetes mellitus, eczema, hyperlipidemia, heart murmur, peripheral neuropathy, peripheral arterial disease, bilateral great toe infections, hypertension, arthritis, sick sinus syndrome, pacemaker, diastolic heart dysfunction, former smoker, vasectomy, left knee arthroscopy, new right knee arthroscopy, bilateral carpal tunnel release, left shoulder arthroscopy, left 2nd toe amputation, and bilateral great toe partial amputations  He has allergies to Invokana, Lamisil, latex, and medical tape  REVIEW OF SYSTEMS:  Patient reports that he is feeling better today than when he arrived at the hospital yesterday  Feels his breathing is much more stable and he does not having any difficulty taking a deep breath  Is not coughing and has no sore throat today  Reports yesterday he was having fevers, chills, and shakes, but reports that so far today he has had no recurrence of these symptoms  He denies nausea, vomiting, diarrhea  No dysuria  He has no pain in his left foot at this time  He understands that his bone is likely infected in the great toe and is agreeable to having it amputated as soon as possible  A complete 12 point system-based review of systems is otherwise negative      PAST MEDICAL HISTORY:  Past Medical History:   Diagnosis Date    Arthritis     OA    Bruise of both arms     forearms and both hands    Bruises easily     [facility]     Diabetes mellitus ([address] )     Diabetic foot ulcer ([facility])     Eczema     Erectile dysfunction     Gout     Hyperlipidemia     Hypertension     Murmur     Nephropathy     Osteoarthritis     [facility]AD [facility]peripheral artery disease) [facility]     Seasonal allergies     Toe infection     bilat great toes    Vertigo     Walks frequently     Wears dentures     upper    Wears glasses      Past Surgical History:   Procedure Laterality Date    CARDIAC PACEMAKER PLACEMENT      CARPAL TUNNEL RELEASE Left     CARPAL TUNNEL RELEASE Right     CARPAL TUNNEL RELEASE      KNEE ARTHROSCOPY Left     KNEE ARTHROSCOPY Right     KNEE SURGERY      FL AMPUTATION TOE,I-P JT Bilateral 5/2/2017    Procedure: PARTIAL AMPUTATION RIGHT AND LEFT HALLUX ;  Surgeon: Faisal Bailey DPM;  Location: [facility];  Service: Podiatry    FL AMPUTATION TOE,MT-P JT Left 2017    Procedure: 2ND TOE AMPUTATION;  Surgeon: Nga Ma DPM;  [facility];  Service: Podiatry    SHOULDER ARTHROSCOPY Left     with screws,RTC    SHOULDER SURGERY      VASECTOMY       FAMILY HISTORY:  Non-contributory    SOCIAL HISTORY:  Social History   Social History     Substance and Sexual Activity   Alcohol Use Never    Frequency: Never     Social History     Substance and Sexual Activity   Drug Use Never     Social History     Tobacco Use   Smoking Status Former Smoker    Packs/day: 1 00    Years: 30 00    Pack years: 30 00    Types: Cigarettes   Smokeless Tobacco Never Used   Tobacco Comment    quit 10 years ago     ALLERGIES:  Allergies   Allergen Reactions    Invokana [Canagliflozin]     Lamisil [Terbinafine] Rash    Lamisil [Terbinafine] Blisters     Wife states " His skin peeled from head to toe"    Other Swelling     Pomegranate - facial swelling, no swelling of tongue, esophagus  Adhesive tape   Latex Rash     MEDICATIONS:  All current active medications have been reviewed      ANTIBIOTICS:  Ceftriaxone - stop  Azithromycin - stop  Vancomycin 1  Antibiotics 2    PHYSICAL EXAM:  Temp:  [97 8 °F (36 6 °C)-103 6 °F (39 8 °C)] 99 2 °F (37 3 °C)  HR:  [] 86  Resp:  [15-20] 18  BP: (125-153)/(58-70) 125/62  SpO2:  [85 %-99 %] 96 %  Temp (24hrs), Av 8 °F (37 7 °C), Min:97 8 °F (36 6 °C), Max:103 6 °F (39 8 °C)  Current: Temperature: 99 2 °F (37 3 °C)  No intake or output data in the 24 hours ending 20 1142    General Appearance:  Appearing well, nontoxic, and in no distress   Head:  Normocephalic, without obvious abnormality, atraumatic   Eyes:  Conjunctiva pink and sclera anicteric, both eyes; patient is wearing his glasses   Nose: Nares normal, mucosa normal, no drainage   Throat: Oropharynx moist without lesions   Neck: Supple, symmetrical, no adenopathy, no tenderness/mass/nodules   Back:   Symmetric, ROM normal, no CVA tenderness   Lungs: Clear to auscultation bilaterally, respirations unlabored; patient is on nasal cannula O2   Chest Wall:  No tenderness or deformity; no edema or erythema over site of patient's left chest pacemaker   Heart:  RRR; + murmur, no rub or gallop   Abdomen:   Soft, non-tender, non-distended, positive bowel sounds throughout   Extremities: No cyanosis or clubbing, mild bilateral lower extremity nonpitting edema; left foot with dressing covering the great toe and distal foot, is clean and dry without breakthrough drainage   Skin: No rashes or lesions  Patient is warm and dry  Lymph nodes: Cervical, supraclavicular nodes normal   Neurologic: Alert and oriented times 3, follows commands, equal hand grasps, symmetric facial movement             LABS, IMAGING, & OTHER STUDIES:  Lab Results:  I have personally reviewed pertinent labs  Results from last 7 days   Lab Units 01/08/20  0602 01/07/20  1207   WBC Thousand/uL 17 09* 20 41*   HEMOGLOBIN g/dL 8 4* 9 3*   PLATELETS Thousands/uL 232 240     Results from last 7 days   Lab Units 01/08/20  0602 01/07/20  1207   POTASSIUM mmol/L 4 4 4 3   CHLORIDE mmol/L 97* 96*   CO2 mmol/L 22 21   BUN mg/dL 61* 51*   CREATININE mg/dL 2 42* 2 08*   EGFR ml/min/1 73sq m 27 32   CALCIUM mg/dL 8 3 8 7   AST U/L  --  24   ALT U/L  --  24   ALK PHOS U/L  --  83     Results from last 7 days   Lab Units 01/08/20  0110 01/07/20  1329   BLOOD CULTURE   --  Staphylococcus aureus*   GRAM STAIN RESULT   --  Gram positive cocci in clusters*  Gram positive cocci in clusters*   LEGIONELLA URINARY ANTIGEN  Negative  --      Results from last 7 days   Lab Units 01/07/20  1329   PROCALCITONIN ng/ml 0 67*     Imaging Studies:   I have personally reviewed pertinent imaging study reports and images in PACS  CT CHEST WO CONTRAST 1/7/20 - I personally reviewed this study which showed septal thickening suggestive of pulmonary edema  He has small bilateral pleural effusions   No consolidation suggestive of bacterial pneumonia  XR CHEST 2 VIEWS 01/07/2020 - I personally reviewed this study which showed a left pleural effusion and pulmonary edema

## 2020-01-08 NOTE — PROGRESS NOTES
[address] Podiatry - Pre Operative Note  Patient: Mariposa Guillen 75 y o  male   MRN: [id number]  PCP: Vince Wright DO  Unit/Bed#: E4 -01 Encounter: [id number]  Date Of Visit: 20    ASSESSMENT:    Mariposa Guillen is a 75 y o  male with:    1  Left hallux wound with underlying OM    PLAN:    · Patient to go to OR today, 20, for  left  Hallux amputation with   [facility]  · Informed Consent signed placed in chart  · Confirmed NPO status  · H&P, vitals, and current labs reviewed  No acute changes noted  · Alternatives, risks, and complications discussed with patient  · All questions answered  No guarantees given to outcome of procedure  Current Antibiotics: vancomycin  Pharmacologic VTE Prophylaxis: Eliquis  Mechanical VTE Prophylaxis: sequential compression device      SUBJECTIVE:     The patient was seen, evaluated, and assessed at bedside today  The patient was awake, alert, and in no acute distress  Patient confirmed NPO status  All questions and concerns regarding the surgical procedure addressed  Patient understands risks vs benefits of procedure and remains amenable with plan to go to OR today  Patient denies N/V/F/chills/SOB/CP  OBJECTIVE:     Vitals:   /61 (BP Location: Left arm)   Pulse 84   Temp 99 7 °F (37 6 °C) (Temporal)   Resp 18   Wt 97 8 kg (215 lb 9 8 oz)   SpO2 90%   BMI 31 22 kg/m²     Temp (24hrs), Av 2 °F (37 9 °C), Min:97 8 °F (36 6 °C), Max:103 6 °F (39 8 °C)      Physical Exam:  General appearance: Alert, cooperative and no acute distress  Extremity: Dressings C/D/I and left intact to the OR  NVS at baseline B/l  MSK function at baseline B/l  No calf tenderness noted B/l      Additional Data:     Labs:    Results from last 7 days   Lab Units 20  0602   WBC Thousand/uL 17 09*   HEMOGLOBIN g/dL 8 4*   HEMATOCRIT % 25 3*   PLATELETS Thousands/uL 232   NEUTROS PCT % 85*   LYMPHS PCT % 6*   MONOS PCT % 8   EOS PCT % 0     Results from last 7 days   Lab Units 01/08/20  0602 01/07/20  1207   POTASSIUM mmol/L 4 4 4 3   CHLORIDE mmol/L 97* 96*   CO2 mmol/L 22 21   BUN mg/dL 61* 51*   CREATININE mg/dL 2 42* 2 08*   CALCIUM mg/dL 8 3 8 7   ALK PHOS U/L  --  83   ALT U/L  --  24   AST U/L  --  24     Results from last 7 days   Lab Units 01/07/20  1207   INR  1 89*       * I Have Reviewed All Lab Data Listed Above  Recent Cultures (last 7 days):     Results from last 7 days   Lab Units 01/08/20  0110 01/07/20  1329   BLOOD CULTURE   --  Staphylococcus aureus*  Staphylococcus aureus*   GRAM STAIN RESULT   --  Gram positive cocci in clusters*  Gram positive cocci in clusters*   LEGIONELLA URINARY ANTIGEN  Negative  --        Imaging: I have personally reviewed pertinent films in PACS  EKG, Pathology, and Other Studies: I have personally reviewed pertinent reports  Today, Patient Was Seen By: Mendel Haley DPM    ** Please Note: Portions of the record may have been created with voice recognition software  Occasional wrong word or "sound a like" substitutions may have occurred due to the inherent limitations of voice recognition software  Read the chart carefully and recognize, using context, where substitutions have occurred   **

## 2020-01-08 NOTE — NURSING NOTE
SLIM and respiratory notified in regards to pt's increased work of breathing and frequent de sating  SLIM at bedside to see patient  High flow NC ordered at this time  Will continue to monitor

## 2020-01-08 NOTE — CONSULTS
Consult - Podiatry   Satya Turpin 75 y o  male MRN: [id number]  Unit/Bed#: E4 -01 Encounter: [id number]    Assessment/Plan     Assessment:  1  Left hallux wound with clinical OM, abscess, and possible infectious tenosynovitis   2  Sepsis - POA  3  Bacteremia  4  DM type 2    Plan:  · Patient has clinical OM of the left hallux due to probing to bone, and purulence expressed  Will hopefully take patient to OR for left hallux amputation tonight pending medical clearance  Spoke to Dr Patric Lopes via tigertext who cleared the patient from their standpoint  · NPO starting now   · The benefits, risks, and alternatives of the surgery were discussed with the patient  Consent will be signed by attending and patient prior to surgery  · All questions/concerns were answered  No guarantees given to outcome of procedure  · Excisional debridement of the left plantar hallux wound was performed today with a 15 blade to remove nonviable, and hyperkeratotic tissue  Underlying wound bed was fibro granular in appearance and measures approximately 1 0 x 0 5 x 2 5 cm  Wound probes to bone  Less than 2 subcutaneously cm was debrided  · Left foot xrays ordered  · BCx show Staph aureus  Continue with IV antibiotics per primary team   · Weight-bearing as tolerated for now  · Rest of care per primary team   · This plan was discussed with my attending    History of Present Illness     HPI:  Satya Turpin is a 75 y o  male who presents with fatigue, and chills with a left hallux wound  He reported to the ED yesterday with a fever of 103 6, and an elevated white count  He regularly follows up with Dr Lewis Rivera at [facility]  foot care  He states he has had the wound for a couple months  He had a partial hallux amputation couple years ago  He reports the wound always closing up, then we opening for the past couple months  Over the past couple days he started to feel fatigued with periodic chills    He last saw Dr Lewis Rivera last Thursday where she took x-rays of his left foot  He denies any pain in the left foot  He reports the left 1st digit becoming more red over the past couple days  He reports feeling better today after IV antibiotics  Denies any recent nausea, vomiting, shortness breath, or chest pains             Inpatient consult to Podiatry     Performed by  Hoang Burns DPM     Authorized by Angela Armando PA-C            Review of Systems   Constitutional: Negative  HENT: Negative  Eyes: Negative  Respiratory: Negative  Cardiovascular: Negative  Gastrointestinal: Negative  Musculoskeletal:  Negative   Skin:  Left hallux wound   Neurological: Negative  Psych: Negative         Historical Information   Past Medical History:   Diagnosis Date    Arthritis     OA    Bruise of both arms     forearms and both hands    Bruises easily     [facility]     Diabetes [facility]     Diabetic foot ulcer [facility]     Eczema     Erectile dysfunction     Gout     Hyperlipidemia     Hypertension     Murmur     Nephropathy     Osteoarthritis     [facility]per[facility] disease) [facility]     Seasonal allergies     Toe infection     bilat great toes    Vertigo     Walks frequently     Wears dentures     upper    Wears glasses      Past Surgical History:   Procedure Laterality Date    CARDIAC PACEMAKER PLACEMENT      CARPAL TUNNEL RELEASE Left     CARPAL TUNNEL RELEASE Right     CARPAL TUNNEL RELEASE      KNEE ARTHROSCOPY Left     KNEE ARTHROSCOPY Right     KNEE SURGERY      DE AMPUTATION TOE,I-P JT Bilateral 5/2/2017    Procedure: PARTIAL AMPUTATION RIGHT AND LEFT HALLUX ;  Surgeon: Alida Vargas DPM;  [facility];  Service: Podiatry    DE AMPUTATION TOE,MT-P JT Left 7/25/2017    Procedure: 2ND TOE AMPUTATION;  Surgeon: Alida Vargas DPM;  [facility];  Service: Podiatry    SHOULDER ARTHROSCOPY Left     with screws,RTC    SHOULDER SURGERY      VASECTOMY Social History   Social History     Substance and Sexual Activity   Alcohol Use Never    Frequency: Never     Social History     Substance and Sexual Activity   Drug Use Never     Social History     Tobacco Use   Smoking Status Former Smoker    Packs/day: 1 00    Years: 30 00    Pack years: 30 00    Types: Cigarettes   Smokeless Tobacco Never Used   Tobacco Comment    quit 10 years ago     Family History:   Family History   Problem Relation Age of Onset    Heart disease Father     No Known Problems Mother     Hypertension Father     Pulmonary embolism Father        Meds/Allergies   Medications Prior to Admission   Medication    allopurinol (ZYLOPRIM) 300 mg tablet    amLODIPine (NORVASC) 10 mg tablet    apixaban (ELIQUIS) 5 mg    betamethasone valerate (VALISONE) 0 1 % cream    Cholecalciferol (VITAMIN D-3) 1000 units CAPS    Dupilumab, Asthma, (DUPIXENT SC)    econazole nitrate 1 % cream    furosemide (LASIX) 40 mg tablet    losartan (COZAAR) 50 mg tablet    metFORMIN (GLUCOPHAGE) 500 mg tablet    metolazone (ZAROXOLYN) 5 mg tablet    metoprolol tartrate (LOPRESSOR) 50 mg tablet    Multiple Vitamin (MULTIVITAMIN) tablet    omega-3-acid ethyl esters (LOVAZA) 1 g capsule    simvastatin (ZOCOR) 20 mg tablet    tamsulosin (FLOMAX) 0 4 mg    glucagon (GLUCAGON EMERGENCY) 1 MG injection    glucose blood (ONE TOUCH ULTRA TEST) test strip    hydrOXYzine HCL (ATARAX) 10 mg tablet    Insulin Degludec-Liraglutide (XULTOPHY) 100 units-3 6 mg/mL injection pen    ONE TOUCH ULTRA TEST test strip     Allergies   Allergen Reactions    Invokana [Canagliflozin]     Lamisil [Terbinafine] Rash    Lamisil [Terbinafine] Blisters     Wife states " His skin peeled from head to toe"    Other Swelling     Pomegranate - facial swelling, no swelling of tongue, esophagus  Adhesive tape        Latex Rash       Objective   First Vitals:   Blood Pressure: 122/59 (01/07/20 1118)  Pulse: 76 (01/07/20 1118)  Temperature: 99 1 °F (37 3 °C) (01/07/20 1118)  Temp Source: Temporal (01/07/20 1118)  Respirations: 18 (01/07/20 1118)  Weight - Scale: 97 2 kg (214 lb 4 6 oz) (01/07/20 1117)  SpO2: 91 % (01/07/20 1118)    Current Vitals:   Blood Pressure: 151/70 (01/08/20 0742)  Pulse: 83 (01/08/20 0742)  Temperature: 98 9 °F (37 2 °C) (01/08/20 0742)  Temp Source: Temporal (01/08/20 0742)  Respirations: 20 (01/08/20 0742)  Weight - Scale: 97 8 kg (215 lb 9 8 oz) (01/08/20 0600)  SpO2: 95 % (01/08/20 0900)        /70 (BP Location: Right arm)   Pulse 83   Temp 98 9 °F (37 2 °C) (Temporal)   Resp 20   Wt 97 8 kg (215 lb 9 8 oz)   SpO2 95%   BMI 31 22 kg/m²      General Appearance:    Alert, cooperative, no distress   Head: Normocephalic, without obvious abnormality   Back: Symmetric   Lungs: Respirations unlabored   Heart: Regular rate and rhythm   Extremities: MMT is 5/5 to all compartments of the LE, +1/4 edema Lefor foot, No pain on palpation noted bilaterally  No calf tenderness noted bilaterally  Pulses: Pedal pulses are palpable B/l, CFT< 3sec to all digits  Pedal hair is Absent   Skin: Plantar left hallux wound noted  Wound measures approximately 1 0 x 0 5 x 2 5 cm  Wound probes to bone  Purulence was noted after expression of the EHL  Wound bed is fibrotic  Surrounding erythema extending to the 1st MPJ  In addition, erythema noted on the anterior ankle of the left   Neurologic: Gross sensation is Intact  Protective sensation is Absent  Lab, Imaging and other studies:   I have personally reviewed pertinent lab results    , CBC:   Lab Results   Component Value Date    WBC 17 09 (H) 01/08/2020    HGB 8 4 (L) 01/08/2020    HCT 25 3 (L) 01/08/2020    MCV 93 01/08/2020     01/08/2020    MCH 30 9 01/08/2020    MCHC 33 2 01/08/2020    RDW 14 5 01/08/2020    MPV 9 3 01/08/2020    NRBC 0 01/08/2020   , CMP:   Lab Results   Component Value Date    SODIUM 130 (L) 01/08/2020    K 4 4 01/08/2020    CL 97 (L) 01/08/2020    CO2 22 01/08/2020    BUN 61 (H) 01/08/2020    CREATININE 2 42 (H) 01/08/2020    CALCIUM 8 3 01/08/2020    AST 24 01/07/2020    ALT 24 01/07/2020    ALKPHOS 83 01/07/2020    EGFR 27 01/08/2020         Imaging: I have personally reviewed pertinent films in PACS  EKG, Pathology, and Other Studies: I have personally reviewed pertinent reports  Portions of the record may have been created with voice recognition software  Occasional wrong word or "sound a like" substitutions may have occurred due to the inherent limitations of voice recognition software  Read the chart carefully and recognize, using context, where substitutions have occurred

## 2020-01-08 NOTE — PLAN OF CARE
Problem: Potential for Falls  Goal: Patient will remain free of falls  Description  INTERVENTIONS:  - Assess patient frequently for physical needs  -  Identify cognitive and physical deficits and behaviors that affect risk of falls    -  [place] fall precautions as indicated by assessment   - Educate patient/family on patient safety including physical limitations  - Instruct patient to call for assistance with activity based on assessment  - Modify environment to reduce risk of injury  - Consider OT/PT consult to assist with strengthening/mobility  Outcome: Progressing     Problem: PAIN - ADULT  Goal: Verbalizes/displays adequate comfort level or baseline comfort level  Description  Interventions:  - Encourage patient to monitor pain and request assistance  - Assess pain using appropriate pain scale  - Administer analgesics based on type and severity of pain and evaluate response  - Implement non-pharmacological measures as appropriate and evaluate response  - Consider cultural and social influences on pain and pain management  - Notify physician/advanced practitioner if interventions unsuccessful or patient reports new pain  Outcome: Progressing     Problem: INFECTION - ADULT  Goal: Absence or prevention of progression during hospitalization  Description  INTERVENTIONS:  - Assess and monitor for signs and symptoms of infection  - Monitor lab/diagnostic results  - Monitor all insertion sites, i e  indwelling lines, tubes, and drains  - Administer medications as ordered  - Instruct and encourage patient and family to use good hand hygiene technique  - Identify and instruct in appropriate isolation precautions for identified infection/condition   Outcome: Progressing     Problem: SAFETY ADULT  Goal: Maintain or return to baseline ADL function  Description  INTERVENTIONS:  -  Assess patient's ability to carry out ADLs; assess patient's baseline for ADL function and identify physical deficits which impact ability to perform ADLs (bathing, care of mouth/teeth, toileting, grooming, dressing, etc )  - Assess/evaluate cause of self-care deficits   - Assess range of motion  - Assess patient's mobility; develop plan if impaired  - Assess patient's need for assistive devices and provide as appropriate  - Encourage maximum independence but intervene and supervise when necessary  - Involve family in performance of ADLs  - Assess for home care needs following discharge   - Consider OT consult to assist with ADL evaluation and planning for discharge  - Provide patient education as appropriate  Outcome: Progressing  Goal: Maintain or return mobility status to optimal level  Description  INTERVENTIONS:  - Assess patient's baseline mobility status (ambulation, transfers, stairs, etc )    - Identify cognitive and physical deficits and behaviors that affect mobility  - Identify mobility aids required to assist with transfers and/or ambulation (gait belt, sit-to-stand, lift, walker, cane, etc )  - [place] fall precautions as indicated by assessment  - Record patient progress and toleration of activity level on Mobility SBAR; progress patient to next Phase/Stage  - Instruct patient to call for assistance with activity based on assessment  - Consider rehabilitation consult to assist with strengthening/weightbearing, etc   Outcome: Progressing     Problem: DISCHARGE PLANNING  Goal: Discharge to home or other facility with appropriate resources  Description  INTERVENTIONS:  - Identify barriers to discharge w/patient and caregiver  - Arrange for needed discharge resources and transportation as appropriate  - Identify discharge learning needs (meds, wound care, etc )  - Arrange for interpretive services to assist at discharge as needed  - Refer to Case Management Department for coordinating discharge planning if the patient needs post-hospital services based on physician/advanced practitioner order or complex needs related to functional status, cognitive ability, or social support system  Outcome: Progressing     Problem: Knowledge Deficit  Goal: Patient/family/caregiver demonstrates understanding of disease process, treatment plan, medications, and discharge instructions  Description  Complete learning assessment and assess knowledge base    Interventions:  - Provide teaching at level of understanding  - Provide teaching via preferred learning methods  Outcome: Progressing     Problem: CARDIOVASCULAR - ADULT  Goal: Maintains optimal cardiac output and hemodynamic stability  Description  INTERVENTIONS:  - Monitor I/O, vital signs and rhythm  - Monitor for S/S and trends of decreased cardiac output  - Administer and titrate ordered vasoactive medications to optimize hemodynamic stability  - Assess quality of pulses, skin color and temperature  - Assess for signs of decreased coronary artery perfusion  - Instruct patient to report change in severity of symptoms  Outcome: Progressing  Goal: Absence of cardiac dysrhythmias or at baseline rhythm  Description  INTERVENTIONS:  - Continuous cardiac monitoring, vital signs, obtain 12 lead EKG if ordered  - Administer antiarrhythmic and heart rate control medications as ordered  - Monitor electrolytes and administer replacement therapy as ordered  Outcome: Progressing     Problem: RESPIRATORY - ADULT  Goal: Achieves optimal ventilation and oxygenation  Description  INTERVENTIONS:  - Assess for changes in respiratory status  - Assess for changes in mentation and behavior  - Position to facilitate oxygenation and minimize respiratory effort  - Oxygen administered by appropriate delivery if ordered  - Initiate smoking cessation education as indicated  - Encourage broncho-pulmonary hygiene including cough, deep breathe, Incentive Spirometry  - Assess the need for suctioning and aspirate as needed  - Assess and instruct to report SOB or any respiratory difficulty  - Respiratory Therapy support as indicated  Outcome: Progressing

## 2020-01-08 NOTE — PROGRESS NOTES
Progress Note - Cardiology   Cleatrice Marleni 75 y o  male MRN: [id number]  Unit/Bed#: E4 -01 Encounter: [id number]        Problem List:  Principal Problem:    Acute respiratory failure with hypoxia ([address] )  Active Problems:    Eczema    Complete heart block (HCC)    PAF (paroxysmal atrial fibrillation) (HCC)    NICOLASA (obstructive sleep apnea)    Type 2 diabetes mellitus with chronic kidney disease, without long-term current use of insulin (HCC)    Essential hypertension    Acute renal failure superimposed on stage 3 chronic kidney disease (HCC)    Leukocytosis    Elevated troponin I level    Bacteremia due to Gram-positive bacteria    Type 2 diabetes mellitus with diabetic neuropathy, without long-term current use of insulin (HCC)    Pyogenic inflammation of bone (HCC)    Chronic diastolic (congestive) heart failure (HCC)      Asessment:  1  Acute hypoxic respiratory failure:              -primary reason for admission              -suspected PNA +/-mild CHF  2  Chronic diastolic congestive heart failure:              -baseline weight:  215 lb (as OP 12/3/19)              -OP diuretic:  Lasix 40 b i d   3  Moderate aortic and mitral stenosis on echo 05/19  4  Complete heart block, s/p [employer] dual-chamber PPM  5  Hypertension   6  Dyslipidemia  7  Acute on chronic kidney injury:              -creatinine 2 08 on admission              -as low as 1 1 July of 2019  8  Elevated troponin, 0 22 on admission  9  Type 2 diabetes mellitus  10  Paroxysmal atrial fibrillation              -stroke prophylaxis:  Eliquis              -rate control:  Lopressor 50 b i d   11  Hypoalbuminemia, 2 6  12  Acute osteomyelitis of left foot    Plan/ Discussion:  1  He is going to the OR today around 1730 with podiatry for left hallux amputation  He has been NPO today and receiving IV fluids  2  Will repeat metabolic panel and restart Lasix tomorrow  3  Creatinine slightly worse today 2 08 --> 2 42    As above repeat BMP tomorrow and reinstate diuretic  He tells me he did urinate less than normal today  Standing weight is today 215 lb (baseline)    4  Results of CT scan noted, likely pulmonary edema  He remains comfortable and is laying nearly supine  Diuretics tomorrow    5  His blood cultures are positive for Staph aureus and he is on antibiotics being managed by infectious disease  An echocardiogram has been ordered  Treatment for the infection is left lower extremity as above, to OR today  Subjective:  Less fevers and chills today  Has no pain  Is breathing comfortably    Vitals:  Vitals:    01/07/20 1117 01/08/20 0600   Weight: 97 2 kg (214 lb 4 6 oz) 97 8 kg (215 lb 9 8 oz)   ,  Vitals:    01/08/20 1132 01/08/20 1411 01/08/20 1500 01/08/20 1524   BP: 125/62   130/61   BP Location: Right arm   Left arm   Pulse: 86   84   Resp: 18   18   Temp: 99 2 °F (37 3 °C) (!) 102 4 °F (39 1 °C)  99 7 °F (37 6 °C)   TempSrc: Temporal Temporal  Temporal   SpO2: 96%  94% 90%   Weight:           Exam:  General: Alert awake and oriented, no acute distress    Heart:  Regular rate and rhythm, no murmurs   Respiratory effort:  Breathing comfortably on supplemental oxygen nasal cannula  Lungs:  Lungs are clear bilaterally without any rales  Lower Limbs:  No edema    Left lower extremity erythematous           Telemetry:       Normal sinus rhythm, , Heart Rate 70's    Medications:    Current Facility-Administered Medications:     acetaminophen (TYLENOL) tablet 650 mg, 650 mg, Oral, Q6H PRN, JAYME Goode-C, 650 mg at 01/08/20 1414    allopurinol (ZYLOPRIM) tablet 300 mg, 300 mg, Oral, QAM, JAYME Goode-C, 300 mg at 01/08/20 0855    amLODIPine (NORVASC) tablet 10 mg, 10 mg, Oral, Daily, JAYME Goode-C, 10 mg at 01/08/20 0855    apixaban (ELIQUIS) tablet 5 mg, 5 mg, Oral, Q12H, JAYME Goode-C, 5 mg at 01/08/20 0855    guaiFENesin (MUCINEX) 12 hr tablet 600 mg, 600 mg, Oral, BID, Myriam Castro PA-C, 600 mg at 01/08/20 0855    insulin glargine (LANTUS) subcutaneous injection 9 Units 0 09 mL, 9 Units, Subcutaneous, HS, Myriam Castro PA-C, 9 Units at 01/07/20 2149    insulin lispro (HumaLOG) 100 units/mL subcutaneous injection 1-5 Units, 1-5 Units, Subcutaneous, 4x Daily (AC & HS), 1 Units at 01/08/20 0857 **AND** Fingerstick Glucose (POCT), , , 4x Daily AC and at bedtime, Myriam Castro PA-C    ipratropium-albuterol (DUO-NEB) 0 5-2 5 mg/3 mL inhalation solution 3 mL, 3 mL, Nebulization, Q6H PRN, Marjorie Duarte MD, 3 mL at 01/07/20 2025    LORazepam (ATIVAN) tablet 0 5 mg, 0 5 mg, Oral, Q8H PRN, Myriam Castro PA-C    melatonin tablet 6 mg, 6 mg, Oral, HS, Myriam Castro PA-C, 6 mg at 01/07/20 2152    metoprolol tartrate (LOPRESSOR) tablet 50 mg, 50 mg, Oral, Q12H [facility], Myriam Castro PA-C, 50 mg at 01/08/20 0855    ondansetron (ZOFRAN) injection 4 mg, 4 mg, Intravenous, Q6H PRN, Myriam Castro PA-C    pravastatin (PRAVACHOL) tablet 40 mg, 40 mg, Oral, Daily With Say Castro PA-C, 40 mg at 01/07/20 1708    sodium chloride 0 9 % infusion, 100 mL/hr, Intravenous, Continuous, Cherre Dandy Bowen, DO, Last Rate: 100 mL/hr at 01/08/20 1523, 100 mL/hr at 01/08/20 1523    tamsulosin (FLOMAX) capsule 0 4 mg, 0 4 mg, Oral, Daily With Say Castro PA-C, 0 4 mg at 01/07/20 1708    vancomycin (VANCOCIN) 1,500 mg in sodium chloride 0 9 % 250 mL IVPB, 15 mg/kg, Intravenous, Q24H, PAT Rich, Stopped at 01/08/20 1430      Labs/Data:        Results from last 7 days   Lab Units 01/08/20  0602 01/07/20  1207   WBC Thousand/uL 17 09* 20 41*   HEMOGLOBIN g/dL 8 4* 9 3*   HEMATOCRIT % 25 3* 27 8*   PLATELETS Thousands/uL 232 240     Results from last 7 days   Lab Units 01/08/20  0602 01/07/20  2135 01/07/20  1652 01/07/20  1207   POTASSIUM mmol/L 4 4  --   --  4 3   CHLORIDE mmol/L 97*  --   --  96*   CO2 mmol/L 22  --   --  21   BUN mg/dL 61*  --   --  51*   TROPONIN I ng/mL 0 65* 0 56* 0 43* 0 22*

## 2020-01-08 NOTE — PROGRESS NOTES
Vancomycin Assessment    Shira Sanders is a 75 y o  male who is currently receiving vancomycin 1500mg IV q24h for soft tissue bacteremia  Relevant clinical data and objective history reviewed:  Creatinine   Date Value Ref Range Status   01/08/2020 2 42 (H) 0 60 - 1 30 mg/dL Final     Comment:     Standardized to IDMS reference method   01/07/2020 2 08 (H) 0 60 - 1 30 mg/dL Final     Comment:     Standardized to IDMS reference method   09/19/2019 1 60 (H) 0 60 - 1 30 mg/dL Final     Comment:     Standardized to IDMS reference method   04/10/2017 1 02 0 70 - 1 25 mg/dL Final     Comment:     Result Comment: For patients >52years of age, the reference limit  for Creatinine is approximately 13% higher for people  identified as -American  /62 (BP Location: Right arm)   Pulse 86   Temp 99 2 °F (37 3 °C) (Temporal)   Resp 18   Wt 97 8 kg (215 lb 9 8 oz)   SpO2 96%   BMI 31 22 kg/m²   No intake/output data recorded  Lab Results   Component Value Date/Time    BUN 61 (H) 01/08/2020 06:02 AM    BUN 19 04/10/2017 12:00 AM    WBC 17 09 (H) 01/08/2020 06:02 AM    WBC 14 4 (H) 04/10/2017 12:00 AM    WBC NONE SEEN 04/10/2017 12:00 AM    HGB 8 4 (L) 01/08/2020 06:02 AM    HGB 13 2 04/10/2017 12:00 AM    HCT 25 3 (L) 01/08/2020 06:02 AM    HCT 39 7 04/10/2017 12:00 AM    MCV 93 01/08/2020 06:02 AM    MCV 91 1 04/10/2017 12:00 AM     01/08/2020 06:02 AM     (H) 04/10/2017 12:00 AM     Temp Readings from Last 3 Encounters:   01/08/20 99 2 °F (37 3 °C) (Temporal)   12/13/19 (!) 96 9 °F (36 1 °C)   09/03/19 (!) 97 4 °F (36 3 °C) (Tympanic)     Vancomycin Days of Therapy: 1    Assessment/Plan  The patient is currently on vancomycin utilizing scheduled dosing based on adjusted body weight (due to obesity)  Baseline risks associated with therapy include: pre-existing renal impairment and concomitant nephrotoxic medications    The patient is currently receiving 1500mg IV q24h and is clinically appropriate and dose will be continued  Pharmacy will also follow closely for s/sx of nephrotoxicity, infusion reactions and appropriateness of therapy  BMP and CBC will be ordered per protocol  Plan for trough as patient approaches steady state, prior to the 4th  dose at 1230pm on 1-  Due to infection severity, will target a trough of 15-20 (appropriate for most indications)   Pharmacy will continue to follow the patients culture results and clinical progress daily      Victor M Hurtado, Pharmacist

## 2020-01-08 NOTE — SOCIAL WORK
HEART FAILURE CARE COORDINATOR: Met with patient who is known to me from previous admissions  I will continue to follow as an inpatient and as needed on discharge

## 2020-01-08 NOTE — OCCUPATIONAL THERAPY NOTE
Occupational Therapy Note:    Patient Name: Carlitos Titus  [id number] Date: 1/8/2020     OT consult received  Chart reviewed  Pt planned for L hallux amputation tonight, 1/8/19  Therefore, pt cx'd this PM  Will attempt as medically appropriate post-op       Beni April, OTR/L

## 2020-01-08 NOTE — PLAN OF CARE
Problem: Potential for Falls  Goal: Patient will remain free of falls  Description  INTERVENTIONS:  - Assess patient frequently for physical needs  -  Identify cognitive and physical deficits and behaviors that affect risk of falls    -  [place] fall precautions as indicated by assessment   - Educate patient/family on patient safety including physical limitations  - Instruct patient to call for assistance with activity based on assessment  - Modify environment to reduce risk of injury  - Consider OT/PT consult to assist with strengthening/mobility  Outcome: Progressing     Problem: PAIN - ADULT  Goal: Verbalizes/displays adequate comfort level or baseline comfort level  Description  Interventions:  - Encourage patient to monitor pain and request assistance  - Assess pain using appropriate pain scale  - Administer analgesics based on type and severity of pain and evaluate response  - Implement non-pharmacological measures as appropriate and evaluate response  - Consider cultural and social influences on pain and pain management  - Notify physician/advanced practitioner if interventions unsuccessful or patient reports new pain  Outcome: Progressing     Problem: INFECTION - ADULT  Goal: Absence or prevention of progression during hospitalization  Description  INTERVENTIONS:  - Assess and monitor for signs and symptoms of infection  - Monitor lab/diagnostic results  - Monitor all insertion sites, i e  indwelling lines, tubes, and drains  - Administer medications as ordered  - Instruct and encourage patient and family to use good hand hygiene technique  - Identify and instruct in appropriate isolation precautions for identified infection/condition   Outcome: Progressing     Problem: SAFETY ADULT  Goal: Maintain or return to baseline ADL function  Description  INTERVENTIONS:  -  Assess patient's ability to carry out ADLs; assess patient's baseline for ADL function and identify physical deficits which impact ability to perform ADLs (bathing, care of mouth/teeth, toileting, grooming, dressing, etc )  - Assess/evaluate cause of self-care deficits   - Assess range of motion  - Assess patient's mobility; develop plan if impaired  - Assess patient's need for assistive devices and provide as appropriate  - Encourage maximum independence but intervene and supervise when necessary  - Involve family in performance of ADLs  - Assess for home care needs following discharge   - Consider OT consult to assist with ADL evaluation and planning for discharge  - Provide patient education as appropriate  Outcome: Progressing  Goal: Maintain or return mobility status to optimal level  Description  INTERVENTIONS:  - Assess patient's baseline mobility status (ambulation, transfers, stairs, etc )    - Identify cognitive and physical deficits and behaviors that affect mobility  - Identify mobility aids required to assist with transfers and/or ambulation (gait belt, sit-to-stand, lift, walker, cane, etc )  - [place] fall precautions as indicated by assessment  - Record patient progress and toleration of activity level on Mobility SBAR; progress patient to next Phase/Stage  - Instruct patient to call for assistance with activity based on assessment  - Consider rehabilitation consult to assist with strengthening/weightbearing, etc   Outcome: Progressing     Problem: DISCHARGE PLANNING  Goal: Discharge to home or other facility with appropriate resources  Description  INTERVENTIONS:  - Identify barriers to discharge w/patient and caregiver  - Arrange for needed discharge resources and transportation as appropriate  - Identify discharge learning needs (meds, wound care, etc )  - Arrange for interpretive services to assist at discharge as needed  - Refer to Case Management Department for coordinating discharge planning if the patient needs post-hospital services based on physician/advanced practitioner order or complex needs related to functional status, cognitive ability, or social support system  Outcome: Progressing     Problem: Knowledge Deficit  Goal: Patient/family/caregiver demonstrates understanding of disease process, treatment plan, medications, and discharge instructions  Description  Complete learning assessment and assess knowledge base    Interventions:  - Provide teaching at level of understanding  - Provide teaching via preferred learning methods  Outcome: Progressing     Problem: CARDIOVASCULAR - ADULT  Goal: Maintains optimal cardiac output and hemodynamic stability  Description  INTERVENTIONS:  - Monitor I/O, vital signs and rhythm  - Monitor for S/S and trends of decreased cardiac output  - Administer and titrate ordered vasoactive medications to optimize hemodynamic stability  - Assess quality of pulses, skin color and temperature  - Assess for signs of decreased coronary artery perfusion  - Instruct patient to report change in severity of symptoms  Outcome: Progressing  Goal: Absence of cardiac dysrhythmias or at baseline rhythm  Description  INTERVENTIONS:  - Continuous cardiac monitoring, vital signs, obtain 12 lead EKG if ordered  - Administer antiarrhythmic and heart rate control medications as ordered  - Monitor electrolytes and administer replacement therapy as ordered  Outcome: Progressing     Problem: RESPIRATORY - ADULT  Goal: Achieves optimal ventilation and oxygenation  Description  INTERVENTIONS:  - Assess for changes in respiratory status  - Assess for changes in mentation and behavior  - Position to facilitate oxygenation and minimize respiratory effort  - Oxygen administered by appropriate delivery if ordered  - Initiate smoking cessation education as indicated  - Encourage broncho-pulmonary hygiene including cough, deep breathe, Incentive Spirometry  - Assess the need for suctioning and aspirate as needed  - Assess and instruct to report SOB or any respiratory difficulty  - Respiratory Therapy support as indicated  Outcome: Progressing

## 2020-01-08 NOTE — PROGRESS NOTES
Progress Note - Jennifer Reardon 75 y o  male MRN: [id number]    Unit/Bed#: E4 -01 Encounter: [id number]      Subjective: The patient feels pretty well  He is much improved since yesterday  He denies chest pain or shortness of breath  He has no nausea or vomiting  He improved significantly after diuretic administration  Physical Exam:   Temp:  [97 8 °F (36 6 °C)-103 6 °F (39 8 °C)] 99 7 °F (37 6 °C)  HR:  [] 84  Resp:  [18-20] 18  BP: (125-151)/(61-70) 130/61  FiO2 (%):  [50] 50    Gen:  Well-developed, well-nourished, in no distress  Neck:  Supple  No lymphadenopathy, goiter, or bruit  Heart:  Regular rhythm  I heard no murmur, gallop, or rub  Lungs:  Clear to auscultation and percussion  Decreased breath sounds at the bases  Abd:  Soft with active bowel sounds  No mass, tenderness, or organomegaly  Extremities:  No clubbing, cyanosis, or edema  No calf tenderness  Neuro:  Alert and oriented  No focal sign  Skin:  Warm and dry      LABS:   CBC:   Lab Results   Component Value Date    WBC 17 09 (H) 01/08/2020    HGB 8 4 (L) 01/08/2020    HCT 25 3 (L) 01/08/2020    MCV 93 01/08/2020     01/08/2020    MCH 30 9 01/08/2020    MCHC 33 2 01/08/2020    RDW 14 5 01/08/2020    MPV 9 3 01/08/2020    NRBC 0 01/08/2020   , CMP:   Lab Results   Component Value Date    SODIUM 130 (L) 01/08/2020    K 4 4 01/08/2020    CL 97 (L) 01/08/2020    CO2 22 01/08/2020    BUN 61 (H) 01/08/2020    CREATININE 2 42 (H) 01/08/2020    CALCIUM 8 3 01/08/2020    EGFR 27 01/08/2020           Assessment/Plan:  1  Staph aureus sepsis  2  Infected left 1st toe  3  Acute respiratory failure with hypoxia  4  Acute diastolic congestive heart failure, improved  5  Acute renal failure presumed secondary to numbers 1 and 4  6  Type 2 diabetes  7  Obstructive sleep apnea  8  Paroxysmal atrial fibrillation  9  Complete heart block, status post pacemaker insertion    The patient is currently on vancomycin    He was evaluated by Cardiology  I believe that much of his acute illness has been precipitated by sepsis  Amputation of his 1st toe has been recommended  The patient has improved clinically over night  Despite his multiple medical problems, I consider his operative risk acceptable because I do not think that his sepsis will be controllable without amputation of his toe      VTE Pharmacologic Prophylaxis:  Eliquis  VTE Mechanical Prophylaxis: sequential compression device

## 2020-01-08 NOTE — PHYSICAL THERAPY NOTE
PHYSICAL THERAPY NOTE          Patient Name: Todd WHELAN Date: 1/8/2020     PT order received  PT eval deferred at this time as pt for toe amputation today pending medical clearance  Will follow post-op as appropriate  Podiatry to update PT & OT consult, WBS & activity orders   Redge Pae, PT

## 2020-01-09 LAB
ANION GAP SERPL CALCULATED.3IONS-SCNC: 13 MMOL/L (ref 4–13)
BASOPHILS # BLD AUTO: 0.06 THOUSANDS/ΜL (ref 0–0.1)
BASOPHILS NFR BLD AUTO: 0 % (ref 0–1)
BUN SERPL-MCNC: 70 MG/DL (ref 5–25)
CALCIUM SERPL-MCNC: 8.8 MG/DL (ref 8.3–10.1)
CHLORIDE SERPL-SCNC: 98 MMOL/L (ref 100–108)
CO2 SERPL-SCNC: 20 MMOL/L (ref 21–32)
CREAT SERPL-MCNC: 2.32 MG/DL (ref 0.6–1.3)
EOSINOPHIL # BLD AUTO: 0.06 THOUSAND/ΜL (ref 0–0.61)
EOSINOPHIL NFR BLD AUTO: 0 % (ref 0–6)
ERYTHROCYTE [DISTWIDTH] IN BLOOD BY AUTOMATED COUNT: 14.2 % (ref 11.6–15.1)
GFR SERPL CREATININE-BSD FRML MDRD: 28 ML/MIN/1.73SQ M
GLUCOSE SERPL-MCNC: 135 MG/DL (ref 65–140)
GLUCOSE SERPL-MCNC: 162 MG/DL (ref 65–140)
GLUCOSE SERPL-MCNC: 267 MG/DL (ref 65–140)
GLUCOSE SERPL-MCNC: 281 MG/DL (ref 65–140)
GLUCOSE SERPL-MCNC: 335 MG/DL (ref 65–140)
HCT VFR BLD AUTO: 27.8 % (ref 36.5–49.3)
HGB BLD-MCNC: 9 G/DL (ref 12–17)
IMM GRANULOCYTES # BLD AUTO: 0.08 THOUSAND/UL (ref 0–0.2)
IMM GRANULOCYTES NFR BLD AUTO: 1 % (ref 0–2)
LYMPHOCYTES # BLD AUTO: 1.19 THOUSANDS/ΜL (ref 0.6–4.47)
LYMPHOCYTES NFR BLD AUTO: 8 % (ref 14–44)
MCH RBC QN AUTO: 30.2 PG (ref 26.8–34.3)
MCHC RBC AUTO-ENTMCNC: 32.4 G/DL (ref 31.4–37.4)
MCV RBC AUTO: 93 FL (ref 82–98)
MONOCYTES # BLD AUTO: 1.1 THOUSAND/ΜL (ref 0.17–1.22)
MONOCYTES NFR BLD AUTO: 8 % (ref 4–12)
NEUTROPHILS # BLD AUTO: 11.83 THOUSANDS/ΜL (ref 1.85–7.62)
NEUTS SEG NFR BLD AUTO: 83 % (ref 43–75)
NRBC BLD AUTO-RTO: 0 /100 WBCS
PLATELET # BLD AUTO: 286 THOUSANDS/UL (ref 149–390)
PMV BLD AUTO: 9.9 FL (ref 8.9–12.7)
POTASSIUM SERPL-SCNC: 4.3 MMOL/L (ref 3.5–5.3)
RBC # BLD AUTO: 2.98 MILLION/UL (ref 3.88–5.62)
SODIUM SERPL-SCNC: 131 MMOL/L (ref 136–145)
WBC # BLD AUTO: 14.32 THOUSAND/UL (ref 4.31–10.16)

## 2020-01-09 PROCEDURE — 87040 BLOOD CULTURE FOR BACTERIA: CPT | Performed by: INTERNAL MEDICINE

## 2020-01-09 PROCEDURE — 82948 REAGENT STRIP/BLOOD GLUCOSE: CPT

## 2020-01-09 PROCEDURE — 99232 SBSQ HOSP IP/OBS MODERATE 35: CPT | Performed by: INTERNAL MEDICINE

## 2020-01-09 PROCEDURE — 97167 OT EVAL HIGH COMPLEX 60 MIN: CPT

## 2020-01-09 PROCEDURE — 97163 PT EVAL HIGH COMPLEX 45 MIN: CPT

## 2020-01-09 PROCEDURE — 80048 BASIC METABOLIC PNL TOTAL CA: CPT | Performed by: INTERNAL MEDICINE

## 2020-01-09 PROCEDURE — 94660 CPAP INITIATION&MGMT: CPT

## 2020-01-09 PROCEDURE — 85025 COMPLETE CBC W/AUTO DIFF WBC: CPT | Performed by: INTERNAL MEDICINE

## 2020-01-09 PROCEDURE — 99232 SBSQ HOSP IP/OBS MODERATE 35: CPT | Performed by: PHYSICIAN ASSISTANT

## 2020-01-09 PROCEDURE — 94760 N-INVAS EAR/PLS OXIMETRY 1: CPT

## 2020-01-09 PROCEDURE — 99233 SBSQ HOSP IP/OBS HIGH 50: CPT | Performed by: INTERNAL MEDICINE

## 2020-01-09 RX ORDER — FUROSEMIDE 10 MG/ML
40 INJECTION INTRAMUSCULAR; INTRAVENOUS
Status: COMPLETED | OUTPATIENT
Start: 2020-01-09 | End: 2020-01-09

## 2020-01-09 RX ADMIN — FUROSEMIDE 40 MG: 10 INJECTION, SOLUTION INTRAMUSCULAR; INTRAVENOUS at 16:57

## 2020-01-09 RX ADMIN — AMLODIPINE BESYLATE 10 MG: 10 TABLET ORAL at 08:55

## 2020-01-09 RX ADMIN — INSULIN LISPRO 1 UNITS: 100 INJECTION, SOLUTION INTRAVENOUS; SUBCUTANEOUS at 08:56

## 2020-01-09 RX ADMIN — ALLOPURINOL 300 MG: 100 TABLET ORAL at 08:55

## 2020-01-09 RX ADMIN — MELATONIN TAB 3 MG 6 MG: 3 TAB at 21:58

## 2020-01-09 RX ADMIN — PRAVASTATIN SODIUM 40 MG: 40 TABLET ORAL at 16:59

## 2020-01-09 RX ADMIN — METOPROLOL TARTRATE 50 MG: 50 TABLET, FILM COATED ORAL at 08:55

## 2020-01-09 RX ADMIN — INSULIN GLARGINE 9 UNITS: 100 INJECTION, SOLUTION SUBCUTANEOUS at 22:06

## 2020-01-09 RX ADMIN — GUAIFENESIN 600 MG: 600 TABLET ORAL at 08:55

## 2020-01-09 RX ADMIN — GUAIFENESIN 600 MG: 600 TABLET ORAL at 21:58

## 2020-01-09 RX ADMIN — METOPROLOL TARTRATE 50 MG: 50 TABLET, FILM COATED ORAL at 21:58

## 2020-01-09 RX ADMIN — FUROSEMIDE 40 MG: 10 INJECTION, SOLUTION INTRAMUSCULAR; INTRAVENOUS at 12:17

## 2020-01-09 RX ADMIN — TAMSULOSIN HYDROCHLORIDE 0.4 MG: 0.4 CAPSULE ORAL at 16:59

## 2020-01-09 RX ADMIN — INSULIN LISPRO 4 UNITS: 100 INJECTION, SOLUTION INTRAVENOUS; SUBCUTANEOUS at 21:58

## 2020-01-09 RX ADMIN — VANCOMYCIN HYDROCHLORIDE 1500 MG: 5 INJECTION, POWDER, LYOPHILIZED, FOR SOLUTION INTRAVENOUS at 12:17

## 2020-01-09 RX ADMIN — INSULIN LISPRO 3 UNITS: 100 INJECTION, SOLUTION INTRAVENOUS; SUBCUTANEOUS at 12:15

## 2020-01-09 RX ADMIN — INSULIN LISPRO 2 UNITS: 100 INJECTION, SOLUTION INTRAVENOUS; SUBCUTANEOUS at 17:40

## 2020-01-09 NOTE — OCCUPATIONAL THERAPY NOTE
[address]  Evaluation     Patient Name: Yana GARCIAN Date: 1/9/2020  Problem List  Principal Problem:    Acute respiratory failure with hypoxia ([address] )  Active Problems:    Eczema    Complete heart block ([facility])    PAF (paroxysmal atrial fibrillation) ([facility])    NICOLASA (obstructive sleep apnea)    Type 2 diabetes mellitus with chronic kidney disease, without long-term current use of insulin ([facility])    Essential hypertension    Acute renal failure superimposed on stage 3 chronic kidney disease ([facility])    Leukocytosis    Chronic diastolic (congestive) heart failure ([facility])    Elevated troponin I level    Bacteremia due to Gram-positive bacteria    Type 2 diabetes mellitus with diabetic neuropathy, without long-term current use of insulin ([address] )    Pyogenic inflammation of bone ([facility])    Past Medical History  Past Medical History:   Diagnosis Date    Arthritis     OA    Bruise of both arms     forearms and both hands    Bruises easily     CHF (congestive heart failure) ([address] )     Diabetes mellitus ([address] )     Diabetic [facility] ulcer [facility]     Eczema     Erectile dysfunction     Gout     Hyperlipidemia     Hypertension     Murmur     Nephropathy     Osteoarthritis     PAD (peripheral artery disease) ([facility])     Seasonal allergies     Toe infection     bilat great toes    Vertigo     Walks frequently     Wears dentures     upper    Wears glasses      Past Surgical History  Past Surgical History:   Procedure Laterality Date    CARDIAC PACEMAKER PLACEMENT      CARPAL TUNNEL RELEASE Left     CARPAL TUNNEL RELEASE Right     CARPAL TUNNEL RELEASE      KNEE ARTHROSCOPY Left     KNEE ARTHROSCOPY Right     KNEE SURGERY      MN AMPUTATION TOE,I-P JT Bilateral 5/2/2017    Procedure: PARTIAL AMPUTATION RIGHT AND LEFT HALLUX ;  Surgeon: Alida Vargas DPM;  Location: [facility] OR;  Service: Podiatry    MN AMPUTATION TOE,MT-P JT Left 7/25/2017    Procedure: 2ND TOE AMPUTATION;  Surgeon: Alida Vargas DPM; [facility];  Service: Podiatry    SHOULDER ARTHROSCOPY Left     with screws,RTC    SHOULDER SURGERY      TOE AMPUTATION Left 1/8/2020    Procedure: HALLUX AMPUTATION;  Surgeon: Vasquez Simon DPM;  [facility];  Service: Podiatry    VASECTOMY               01/09/20 1038   Note Type   Note type Eval/Treat   Restrictions/Precautions   Weight Bearing Precautions Per Order Yes   LLE Weight Bearing Per Order NWB   Other Precautions WBS;O2;Fall Risk   Pain Assessment   Pain Assessment No/denies pain   Pain Score No Pain   Home Living   Type of [address] [place] [address] Two level;1/2 bath on main level  (0 [address]URA; bed and 1/2 bath on 1st floor, full bath 2nd floor)   Bathroom Shower/Tub Tub/shower unit   Bathroom Toilet Standard   Bathroom Equipment Shower chair   Bathroom Accessibility Accessible   Home Equipment Walker;Cane   Additional Comments no use of AD   Prior Function   Level of [place] Independent with ADLs and functional mobility   Lives With Spouse; Family   Receives Help From Family   ADL Assistance Independent   IADLs Independent   Falls in the last 6 months 0   Vocational Retired   Comments PTA pt I with all ADLs and IADLs, +drives, -falls   Lifestyle   Autonomy PTA pt I with all ADLs and IADLs, +drives, -falls   Reciprocal Relationships lives with spouse, 2 children, and 4 grandchildren   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   Psychosocial   Psychosocial (WDL) [address] "I am a little anxious about moving"   ADL   Where Assessed Edge of bed   Eating Assistance 7  [address] 5  Supervision/Setup   LB Bathing Assistance 4  [address] 5  Supervision/Setup    [facility] 4  [address]   3  Moderate Assistance   Bed Mobility   Supine to Sit 7  Independent   Additional Comments Pt supine in bed upon arrival, sitting EOB at the end of the session all needs met  Transfers   Sit to Stand 4  Minimal assistance   Additional items Assist x 2;Verbal cues; Increased time required  (demonstration for technique, VC for NWB maintainence)   Stand to Sit 4  Minimal assistance   Additional items Assist x 2;Bedrails;Verbal cues   Balance   Static Sitting Good   Dynamic Sitting Good   Static Standing Fair -   Dynamic Standing Poor +   Ambulatory Poor +   Activity Tolerance   Activity Tolerance   (limited to pt anxiety)   Medical Staff Made Aware Velvet PT   Nurse Made Aware MARGAUX WILKINSON Assessment   RUE Assessment WFL  (5/5 strength throughout)   LUE Assessment   LUE Assessment WFL  (5/5 strength throughout )   Hand Function   Gross Motor Coordination Functional   Fine Motor Coordination Functional   Sensation   Light Touch No apparent deficits   Cognition   Overall Cognitive Status WFL   Arousal/Participation Alert; Cooperative   Attention Within functional limits   Orientation Level Oriented X4   Memory Within functional limits   Following Commands Follows all commands and directions without difficulty   Assessment   Limitation Decreased ADL status; Decreased Safe judgement during ADL;Decreased cognition;Decreased endurance;Decreased self-care trans;Decreased high-level ADLs   Prognosis Good   Assessment Patient is a 75 y o  y/o male admitted to [facility] on 1/7/2020 due to Acute respiratory failure with hypoxia ([facility] )  Pt underwent L hallux amputation and is NWB on LLE  Comorbidities affecting pt's physical performance at time of assessment include L hallux amputation, eczema, NICOLASA, CKD, HTN  Patient has active OT orders and activity orders for Up and OOB as tolerated   Prior to admission pt was living with family in a multi-level home, bedroom on 1st floor  Patient independent w/ all ADLs, and functional mobility, retired, used no AD for mobility and driving    At the time of evaluation patient currently requires min A for LB ADLs, mod A for toileting and set up/supervision for UB ADLs, and min Ax2 for functional mobility  The following deficits affected patient's occupational performance weakness, decreased ROM, decreased functional strength, decreased functional balance, decreased activity tolerance and orthopedic restrictions  Patient would benefit from skilled OT services while in the hospital to address above deficits  Occupational performance areas to be addressed include ADL retraining, functional transfer training, endurance training, patient/family training, equipment evaluation/education, compensatory technique education, activity engagement and activity tolerance in order to maximize patient's level of function  From OT standpoint recommend STR vs OP PT/OT vs Home PT/OT pending progress in therapy specific to functional mobility status upon D/C  OT continue to follow pt 3-5x/week to address the following goals  Goals   Patient Goals to go home   Long Term Goal see goals listed below   Plan   Treatment Interventions ADL retraining;UE strengthening/ROM; Endurance training;Patient/family training;Equipment evaluation/education; Compensatory technique education; Energy conservation; Activityengagement   Goal Expiration Date 01/19/20   OT Treatment Day 0   OT Frequency 3-5x/wk   Recommendation   OT Discharge Recommendation Short Term Rehab  (vs OP PT/OT vs Home OT/PT pending progress)   Barthel Index   Feeding 10   Bathing 0   Grooming Score 5   Dressing Score 5   Bladder Score 10   Bowels Score 10   Toilet Use Score 5   Transfers (Bed/Chair) Score 10   Mobility (Level Surface) Score 0   Stairs Score 0   Barthel Index Score 55      Goals    1) Patient will complete UB ADLs w/ mod I using AE and AD as needed    2) Patient will complete LB ADLs w/ mod I using AE and AD as needed    3) Patient will complete toileting w/ mod I w/ G hygiene/thoroughness    4) Patient will complete bed mobility with Mod I without use of bed rails    5) Patient will tolerate therapeutic activities for greater than 15 min, in order to increase tolerance for functional activities       6) Patient will perform functional transfers with Mod I to/from all surfaces using DME as needed    7) Patient will complete functional mobility during ADL/IADL/leisure tasks with Mod I     8) Patient will demonstrate 100% adherence to NWB status on LLE during all functional activities w/o cues from therapist           CESAR Espinal/SANDY

## 2020-01-09 NOTE — PROGRESS NOTES
Progress Note - Cardiology   Fco Zaldivar 75 y o  male MRN: [id number]  Unit/Bed#: E4 -01 Encounter: [id number]        Problem List:  Principal Problem:    Acute respiratory failure with hypoxia ([address] )  Active Problems:    Eczema    Complete heart block (HCC)    PAF (paroxysmal atrial fibrillation) (HCC)    NICOLASA (obstructive sleep apnea)    Type 2 diabetes mellitus with chronic kidney disease, without long-term current use of insulin (HCC)    Essential hypertension    Acute renal failure superimposed on stage 3 chronic kidney disease (HCC)    Leukocytosis    Elevated troponin I level    Bacteremia due to Gram-positive bacteria    Type 2 diabetes mellitus with diabetic neuropathy, without long-term current use of insulin (HCC)    Pyogenic inflammation of bone (HCC)    Chronic diastolic (congestive) heart failure (HCC)      Asessment:  1  Acute hypoxic respiratory failure:              -primary reason for admission              -suspected PNA +/-mild CHF  2  Chronic diastolic congestive heart failure:              -baseline weight:  215 lb (as OP 12/3/19)              -OP diuretic:  Lasix 40 b i d   3  Moderate aortic and mitral stenosis on echo 05/19  4  Complete heart block, s/p Medtronic dual-chamber PPM  5  Hypertension   6  Dyslipidemia  7  Acute on chronic kidney injury:              -creatinine 2 08 on admission              -as low as 1 1 July of 2019  8  Elevated troponin, 0 22 on admission  9  Type 2 diabetes mellitus  10  Paroxysmal atrial fibrillation              -stroke prophylaxis:  Eliquis              -rate control:  Lopressor 50 b i d   11  Hypoalbuminemia, 2 6  12  Acute osteomyelitis of left foot    Plan/ Discussion:  1  Still on oxygen and lung sounds slightly wet, will give 2 doses of IV Lasix today and reassess tomorrow, hopefully back to oral Lasix    2  Creatinine remains about the same as yesterday, only slightly better 2 42 --> 2 32    3   Ongoing workup for Staph bacteremia per Infectious Disease and left hallux amputation per Podiatry    4  Okay for telemetry to  (unremarkable thus far)    Subjective:  Overall feels well  Gets anxious without the oxygen  Is not short of breath      Vitals:  Vitals:    20 0600 20 0556   Weight: 97 8 kg (215 lb 9 8 oz) 97 3 kg (214 lb 8 1 oz)   ,  Vitals:    20 2343 20 0054 20 0556 20 0710   BP:    124/59   BP Location:    Right arm   Pulse:    64   Resp:    16   Temp:  99 7 °F (37 6 °C)  99 3 °F (37 4 °C)   TempSrc:  Tympanic  Tympanic   SpO2: 93%   94%   Weight:   97 3 kg (214 lb 8 1 oz)        Exam:  General:  Alert awake and oriented    Heart:  Regular rate and rhythm  Respiratory effort:  Breathing comfortably on 4 L O2  Lungs:  Slight rales at bases  Lower Limbs:  No edema             Medications:    Current Facility-Administered Medications:     acetaminophen (TYLENOL) tablet 650 mg, 650 mg, Oral, Q6H PRN, Deadwood Mocha, DPM, 650 mg at 20 2308    allopurinol (ZYLOPRIM) tablet 300 mg, 300 mg, Oral, QAM, Wilson Mocha, DPM, 300 mg at 20 0855    amLODIPine (NORVASC) tablet 10 mg, 10 mg, Oral, Daily, Deadwood Mocha, DPM, 10 mg at 20 0855    apixaban (ELIQUIS) tablet 5 mg, 5 mg, Oral, Q12H, Deadwood Mocha, DPM, Stopped at 20 0900    guaiFENesin (MUCINEX) 12 hr tablet 600 mg, 600 mg, Oral, BID, Wilson Mocha, DPM, 600 mg at 20 0855    insulin glargine (LANTUS) subcutaneous injection 9 Units 0 09 mL, 9 Units, Subcutaneous, HS, Deadwood Mocha, DPM, 9 Units at 20 2134    insulin lispro (HumaLOG) 100 units/mL subcutaneous injection 1-5 Units, 1-5 Units, Subcutaneous, 4x Daily (AC & HS), 1 Units at 20 0856 **AND** Fingerstick Glucose (POCT), , , 4x Daily AC and at bedtime, Deadwood Mocha, DPM    ipratropium-albuterol (DUO-NEB) 0 5-2 5 mg/3 mL inhalation solution 3 mL, 3 mL, Nebulization, Q6H PRN, Wilson Clarita, DPM, 3 mL at 20    LORazepam (ATIVAN) tablet 0 5 mg, 0 5 mg, Oral, Q8H PRN, Marnette Loud, DPM    melatonin tablet 6 mg, 6 mg, Oral, HS, Marnette Loud, DPM, 6 mg at 01/08/20 2134    metoprolol tartrate (LOPRESSOR) tablet 50 mg, 50 mg, Oral, Q12H [facility], Marnette Loud, DPM, 50 mg at 01/09/20 0855    ondansetron (ZOFRAN) injection 4 mg, 4 mg, Intravenous, Q6H PRN, Marnette Loud, DPM    oxyCODONE (ROXICODONE) IR tablet 5 mg, 5 mg, Oral, Q4H PRN, Marnette Loud, DPM    pravastatin (PRAVACHOL) tablet 40 mg, 40 mg, Oral, Daily With Dinner, Marnette Loud, DPM, 40 mg at 01/07/20 1708    sodium chloride 0 9 % infusion, 100 mL/hr, Intravenous, Continuous, Marnette Loud, DPM, Stopped at 01/09/20 0106    tamsulosin (FLOMAX) capsule 0 4 mg, 0 4 mg, Oral, Daily With Dinner, Marnette Loud, DPM, 0 4 mg at 01/07/20 1708    vancomycin (VANCOCIN) 1,500 mg in sodium chloride 0 9 % 250 mL IVPB, 15 mg/kg, Intravenous, Q24H, Marnette Loud, DPM, Stopped at 01/08/20 1430      Labs/Data:        Results from last 7 days   Lab Units 01/09/20  0410 01/08/20  0602 01/07/20  1207   WBC Thousand/uL 14 32* 17 09* 20 41*   HEMOGLOBIN g/dL 9 0* 8 4* 9 3*   HEMATOCRIT % 27 8* 25 3* 27 8*   PLATELETS Thousands/uL 286 232 240     Results from last 7 days   Lab Units 01/09/20  0410 01/08/20  0602 01/07/20  2135 01/07/20  1652 01/07/20  1207   POTASSIUM mmol/L 4 3 4 4  --   --  4 3   CHLORIDE mmol/L 98* 97*  --   --  96*   CO2 mmol/L 20* 22  --   --  21   BUN mg/dL 70* 61*  --   --  51*   TROPONIN I ng/mL  --  0 65* 0 56* 0 43* 0 22*

## 2020-01-09 NOTE — PROGRESS NOTES
Progress Note - Sandra Mares 75 y o  male MRN: [id number]    Unit/Bed#: E4 -01 Encounter: [id number]      Subjective: The patient feels quite a bit better  He tolerated toe amputation yesterday afternoon without difficulty  He has had no chest pain, shortness of breath, nausea, vomiting  Physical Exam:   Temp:  [98 °F (36 7 °C)-99 7 °F (37 6 °C)] 98 4 °F (36 9 °C)  HR:  [63-93] 63  Resp:  [16-21] 18  BP: (122-164)/(57-72) 122/57    Gen:  Well-developed, well-nourished, in no distress  Neck:  Supple  No lymphadenopathy, goiter, or bruit  Heart:  Regular rhythm  No murmur, gallop, or rub  Lungs:  Clear to auscultation and percussion  No wheezing, rales, or rhonchi    Abd:  Soft with active bowel sounds  No mass, tenderness, or organomegaly  Extremities:  No clubbing, cyanosis, or edema  Surgical dressing is dry  Neuro:  Alert and oriented  No focal sign  Skin:  Warm and dry      LABS:   CBC:   Lab Results   Component Value Date    WBC 14 32 (H) 01/09/2020    HGB 9 0 (L) 01/09/2020    HCT 27 8 (L) 01/09/2020    MCV 93 01/09/2020     01/09/2020    MCH 30 2 01/09/2020    MCHC 32 4 01/09/2020    RDW 14 2 01/09/2020    MPV 9 9 01/09/2020    NRBC 0 01/09/2020   , CMP:   Lab Results   Component Value Date    SODIUM 131 (L) 01/09/2020    K 4 3 01/09/2020    CL 98 (L) 01/09/2020    CO2 20 (L) 01/09/2020    BUN 70 (H) 01/09/2020    CREATININE 2 32 (H) 01/09/2020    CALCIUM 8 8 01/09/2020    EGFR 28 01/09/2020             Assessment/Plan:  1  Staph aureus bacteremia with severe sepsis  2  Infected left 1st toe/osteomyelitis, status post amputation  3  Acute respiratory failure with hypoxia  4  Acute diastolic congestive heart failure, improved  5  Acute renal failure secondary to numbers 1 and 4, slightly improved  6  Type 2 diabetes  7  Obstructive sleep apnea  8  Paroxysmal atrial fibrillation  9  History of complete heart block, status post pacemaker  10   Hyponatremia     The patient appears more stable since he underwent amputation  His temperature is down  His hemodynamics are good  His creatinine has improved slightly  We will continue to monitor his creatinine and glucose carefully      VTE Pharmacologic Prophylaxis:  Eliquis  VTE Mechanical Prophylaxis: sequential compression device

## 2020-01-09 NOTE — PLAN OF CARE
Problem: PHYSICAL THERAPY ADULT  Goal: Performs mobility at highest level of function for planned discharge setting  See evaluation for individualized goals  Description  Treatment/Interventions: Functional transfer training, Therapeutic exercise, Elevations, Patient/family training, Equipment eval/education, Bed mobility, Gait training  Equipment Recommended: Severiano Frost, Wheelchair       See flowsheet documentation for full assessment, interventions and recommendations  Note:   Prognosis: Excellent  Problem List: Decreased endurance, Impaired balance, Decreased mobility, Decreased safety awareness(surgical restrictions (WBing))  Assessment: Pt is a 75year old male admitted to [facility] on 1/7/20 with c/o sore throat and malaise x 2-3 days  Pt is being treated for acute respiratory failure with hypoxia and clinical OM left hallux  Pt s/p L hallux amputation on 1/8/20  Pt has a history of CHF, Afib, heart block, pacer, NICOLASA, IDDM, HTN, CKD  PT consulted with eval and treat and activity as tolerated orders  Pt is NWB LLE  Pt lives with his wife and family in a [facility], no LAURA  Bedroom and 1/2 bath on first floor and full bath on 2nd floor  At baseline, pt is independent with ADLs and ambulation without AD, but has a RW and SPC  Upon evaluation, pt performed bed mobility independently, but required min A x 2 for transfers  Pt ambulated 3' fwd/ bwd with RW and min A x 2  Pt demonstrates slow hop-to gait  Pt required verbal cues for technique and safety  Pt presents with weakness, impaired balance, gait dysfunction and decreased safety awareness  PT to follow 5x/wk during acute stay to address deficits  Recommend STR v  Home PT and 24 hour supervision depending on progress to maximize safe mobility and function  If pt returns home, may need W/C for distances while NWBing    Barriers to Discharge: None     Recommendation: Short-term skilled PT, Home PT, 24 hour supervision/assist          See flowsheet documentation for full assessment

## 2020-01-09 NOTE — PROGRESS NOTES
Progress Note - Podiatry  Todd Del Castillo 75 y o  male MRN: [id number]  Unit/Bed#: E4 -01 Encounter: [id number]    Assessment:  1  Left hallux wound with clinical OM, abscess, and possible infectious tenosynovitis   - S/P left hallux amputaiton (DOS: 1/8/20)  2  Sepsis - POA  3  Bacteremia likely secondary to #1  4  DM type 2    Plan:  · Dressing changed with attending at bedside  Surgical incision is healing well  Presumed surgical cure obtained  · Hold Eliquis until the morning of 1/10/20  · NWB LLE  · C/w IV Vanco per ID  · Rest of care per primary team     Subjective/Objective   Chief Complaint:   Chief Complaint   Patient presents with    Fatigue     Pt with multiple complaints: sore throat, diarrhea, fatigue, fevers, tylenol at 0930  -ill contacts  Subjective: 75 y o  y/o male was seen and evaluated at bedside in NAD, non-toxic in appearance  He reports feeling much better than yesterday  He denies any recent N/V/F/C/SOB/CP  Blood pressure 124/59, pulse 64, temperature 99 3 °F (37 4 °C), temperature source Tympanic, resp  rate 16, weight 97 3 kg (214 lb 8 1 oz), SpO2 94 %  ,Body mass index is 31 06 kg/m²  Invasive Devices     Peripheral Intravenous Line            Peripheral IV 01/07/20 Right Antecubital 1 day                Physical Exam:   General: Alert, cooperative and no distress    Lower Extremity Exam:     NVS at baseline B/l  MSK function at baseline B/l  No calf tenderness noted B/l  LLE: Dressing is c/d/i without strike through  Surgical incision is well coapted with sutures intact  Mild sanginous drainage  Mild surrounding erythema but improving  No ascending cellulitis or purulence noted  CRT to the dorsal and plantar flaps are brisk  Left foot    Lab, Imaging and other studies:   I have personally reviewed pertinent lab results    , CBC:   Lab Results   Component Value Date    WBC 14 32 (H) 01/09/2020    HGB 9 0 (L) 01/09/2020    HCT 27 8 (L) 01/09/2020    MCV 93 01/09/2020     01/09/2020    MCH 30 2 01/09/2020    MCHC 32 4 01/09/2020    RDW 14 2 01/09/2020    MPV 9 9 01/09/2020    NRBC 0 01/09/2020   , CMP:   Lab Results   Component Value Date    SODIUM 131 (L) 01/09/2020    K 4 3 01/09/2020    CL 98 (L) 01/09/2020    CO2 20 (L) 01/09/2020    BUN 70 (H) 01/09/2020    CREATININE 2 32 (H) 01/09/2020    CALCIUM 8 8 01/09/2020    EGFR 28 01/09/2020       Imaging: I have personally reviewed pertinent films in PACS  EKG, Pathology, and Other Studies: I have personally reviewed pertinent reports  Portions of the record may have been created with voice recognition software  Occasional wrong word or "sound a like" substitutions may have occurred due to the inherent limitations of voice recognition software  Read the chart carefully and recognize, using context, where substitutions have occurred

## 2020-01-09 NOTE — SOCIAL WORK
Met with pt to complete assessment and explain role  PCP is Dr Paddy Dumont  Pt lives in [place] with his spouse, son, dtr-in-law and grandchildren in a 2 story house with no steps to enter  Pt was independent with ADLs, drives a car amb with no devices  DME RW and NO longer has home 02 by [employer]  Pt was not open with any VNA  Pt stated he has help at home if he needs it  Pt uses [facility] in [place]  Pt has no hx of MH or D&A  Pt has POA/Living Will  Spouse Angelia Ferrell is  and someone in the family will transport home  Informed pt of PT recommendation is SNF and home PT  Pt is refusing both  Pt stated he wants to do OP PT at [facility] where he was before  Pt stated he knows the PT staff there very well and would like to go there  PT is recommending walker and w/c  Pt stated he has walker and would not need w/c

## 2020-01-09 NOTE — PROGRESS NOTES
Progress Note - Infectious Disease   Fco Zaldivar 75 y o  male MRN: [id number]  Unit/Bed#: E4 -01 Encounter: [id number]      Impression/Plan:  1  Sepsis  POA  Fever and leukocytosis  Likely secondary to Staph bacteremia,  with left hallux wound as presumed source  No other clear source appreciated  He has no GI or  complaints  Chest imaging was representative of pulmonary edema  Despite being systemically ill he has been hemodynamically stable  This morning his fever curve and WBC count are trending down  Repeat blood cultures are pending   -antibiotic as below  -check CBC and BMP tomorrow  -follow up blood cultures  -follow up repeat blood cultures  -monitor vitals  -supportive care     2  Staph bacteremia  Showing in both sets of patient's initial blood cultures  Suspect patient's left great toe ulcer was source  Patient does have an implanted pacemaker and should pursue a TTE/ELBERT to assess for valvular vegetation  Patient is currently receiving IV vancomycin and he is tolerating without difficulty  Will continue for now and deescalate if possible pending final blood culture results  He will eventually need PICC placement for long-term antibiotics but will wait to place until repeat blood cultures are negative >72 hours  Repeat blood cultures are pending   -continue IV vancomycin  -continue pharmacy consult for guidance on vancomycin dosage  -check CBC and BMP tomorrow  -follow up blood cultures  -follow up repeat blood cultures  -wait to place PICC until repeat blood cultures are negative >72 hours  -check TTE/ELBERT  -monitor vitals     3  Left hallux ulceration  With osteomyelitis as his wound does probe to bone and an x-ray of the left foot was suggestive of bony erosion at the 1st proximal phalanx  Purulence noted during initial podiatry wound exam, concern for possible infectious tenosynovitis  I suspect this was the source of patient's bacteremia above    He is now status post amputation of hallux for presumed surgical cure of the ulcer and osteomyelitis  -serial left foot exams  -local wound care per Podiatry  -continue close follow-up with Podiatry     4  Acute hypoxic respiratory failure  Suspect this is secondary to pulmonary edema and diastolic heart failure  BNP and troponin were elevated upon admission  Chest x-ray and CT of the chest did not show consolidations indicative of a bacterial process  Patient did initially require high-flow O2  Now his O2 saturation is stable on nasal cannula oxygen  He is following closely with Cardiology and is receiving diuresis  -diuresis per Cardiology  -monitor vitals  -monitor respiratory status  -continue close follow-up with Cardiology     5  Type 2 diabetes mellitus with neuropathy  Patient's last hemoglobin A1c was 6 4% on 01/07/2020  This is risk factor for wounds and infection  Recommend tight glycemic control for overall health, especially in the setting of wound infection   -blood glucose management per primary service     6  Chronic diastolic heart failure  In setting of aortic and mitral valve stenosis and complete heart block  He has a [employer] dual chamber pacemaker in place  Now with Staph bacteremia, patient will have TTE/ELBERT for further assessment for endocarditis/pacer infection  He is following closely with Cardiology   -check TTE/ELBERT  -continue follow-up with Cardiology     7  Paroxysmal AFib  On Eliquis      Above plan was discussed in detail with patient and his wife at the bedside  Antibiotics:  Vancomycin 2  Antibiotics 3    Subjective:  Patient reports he is feeling well today  Was pleased to speak to the podiatrist this morning and learned that his surgery was a success in removing the affected bone  He does not have any pain in his foot today  Patient is overall feeling much better and has no lingering body aches  He tells me that he feels his energy is better and he is no longer feeling weak    He denies fever, chills, sweats, shakes; no nausea, vomiting, abdominal pain, diarrhea, or dysuria; no cough, shortness of breath, or chest pain  No new symptoms  Objective:  Vitals:  Temp:  [98 °F (36 7 °C)-102 4 °F (39 1 °C)] 99 3 °F (37 4 °C)  HR:  [64-93] 64  Resp:  [16-21] 16  BP: (124-164)/(59-72) 124/59  SpO2:  [90 %-96 %] 94 %  Temp (24hrs), Av 6 °F (37 6 °C), Min:98 °F (36 7 °C), Max:102 4 °F (39 1 °C)  Current: Temperature: 99 3 °F (37 4 °C)    Physical Exam:   General Appearance:  Alert, interactive, nontoxic, no acute distress  Throat: Oropharynx moist without lesions  Lungs:   Clear to auscultation bilaterally; no wheezes, rhonchi or rales; respirations unlabored; patient on room air   Chest: No edema or erythema over patient's left chest pacemaker site   Heart:  RRR; +murmur, no rub or gallop   Abdomen:   Soft, non-tender, non-distended, positive bowel sounds  Extremities: No clubbing or cyanosis; stable mild bilateral lower extremity nonpitting edema; left foot dressing is intact, no breakthrough bleeding or drainage, no spreading erythema from dressing site   Skin: No new rashes, lesions, or draining wounds noted on exposed skin  Labs, Imaging, & Other studies:   All pertinent labs and imaging studies were personally reviewed  Results from last 7 days   Lab Units 20  0410 20  0602 20  1207   WBC Thousand/uL 14 32* 17 09* 20 41*   HEMOGLOBIN g/dL 9 0* 8 4* 9 3*   PLATELETS Thousands/uL 286 232 240     Results from last 7 days   Lab Units 20  0410  20  1207   POTASSIUM mmol/L 4 3   < > 4 3   CHLORIDE mmol/L 98*   < > 96*   CO2 mmol/L 20*   < > 21   BUN mg/dL 70*   < > 51*   CREATININE mg/dL 2 32*   < > 2 08*   EGFR ml/min/1 73sq m 28   < > 32   CALCIUM mg/dL 8 8   < > 8 7   AST U/L  --   --  24   ALT U/L  --   --  24   ALK PHOS U/L  --   --  83    < > = values in this interval not displayed       Results from last 7 days   Lab Units 20  0922 01/07/20  1329   BLOOD CULTURE   --  Staphylococcus aureus*  Staphylococcus aureus*   GRAM STAIN RESULT   --  Gram positive cocci in clusters*  Gram positive cocci in clusters*   LEGIONELLA URINARY ANTIGEN  Negative  --      Results from last 7 days   Lab Units 01/08/20  0602 01/07/20  1329   PROCALCITONIN ng/ml 1 88* 0 67*

## 2020-01-09 NOTE — PLAN OF CARE
Problem: OCCUPATIONAL THERAPY ADULT  Goal: Performs self-care activities at highest level of function for planned discharge setting  See evaluation for individualized goals  Description  Treatment Interventions: ADL retraining, UE strengthening/ROM, Endurance training, Patient/family training, Equipment evaluation/education, Compensatory technique education, Energy conservation, Activityengagement          See flowsheet documentation for full assessment, interventions and recommendations  Note:   Limitation: Decreased ADL status, Decreased Safe judgement during ADL, Decreased cognition, Decreased endurance, Decreased self-care trans, Decreased high-level ADLs  Prognosis: Good  Assessment: Patient is a 75 y o  y/o male admitted to [facility] on 1/7/2020 due to Acute respiratory failure with hypoxia ([facility] [address] )  Pt underwent L hallux amputation and is NWB on LLE  Comorbidities affecting pt's physical performance at time of assessment include L hallux amputation, eczema, NICOLASA, CKD, HTN  Patient has active OT orders and activity orders for Up and OOB as tolerated   Prior to admission pt was living with family in a multi-level home, bedroom on 1st floor  Patient independent w/ all ADLs, and functional mobility, retired, used no AD for mobility and driving   At the time of evaluation patient currently requires min A for LB ADLs, mod A for toileting and set up/supervision for UB ADLs, and min Ax2 for functional mobility  The following deficits affected patient's occupational performance weakness, decreased ROM, decreased functional strength, decreased functional balance, decreased activity tolerance and orthopedic restrictions  Patient would benefit from skilled OT services while in the hospital to address above deficits   Occupational performance areas to be addressed include ADL retraining, functional transfer training, endurance training, patient/family training, equipment evaluation/education, compensatory technique education, activity engagement and activity tolerance in order to maximize patient's level of function  From OT standpoint recommend STR vs OP PT/OT vs Home PT/OT pending progress in therapy specific to functional mobility status upon D/C  OT continue to follow pt 3-5x/week to address the following goals       OT Discharge Recommendation: Short Term Rehab(vs OP PT/OT vs Home OT/PT pending progress)

## 2020-01-09 NOTE — NURSING NOTE
Discussed with podiatry resident, ok to D/C fluids at this time r/t concern for fluid overload  Will continue to monitor

## 2020-01-09 NOTE — OP NOTE
OPERATIVE REPORT - Podiatry  PATIENT NAME: Carl Jeter    :  1955  MRN: [id number]  Pt Location: [facility] OR ROOM 02    SURGERY DATE: 2020    Surgeon(s) and Role:     * Adelina Ellison DPM - Primary     * Mendel Haley DPM - Assisting    Pre-op Diagnosis:  Type 2 diabetes mellitus with diabetic neuropathy, without long-term current use of insulin ([facility]) [E11 40]  Other acute osteomyelitis of left foot ([address] ) [M86 172]  Sepsis  Bacteremia  Abscess    Post-Op Diagnosis Codes: * Type 2 diabetes mellitus with diabetic neuropathy, without long-term current use of insulin ([address] ) [E11 40]     * Other acute osteomyelitis of left foot (HCC) [M86 172]        Sepsis        Bacteremia        Abscess    Procedure(s) (LRB):  HALLUX AMPUTATION (Left)    Specimen(s):  ID Type Source Tests Collected by Time Destination   1 : left hallux Tissue Bone TISSUE EXAM Adelina Ellison DPM 2020 1807    2 : products of debridement left toe Tissue Soft Tissue, Other TISSUE EXAM Adelina Ellison DPM 2020 1810    A : left superficial hallux Tissue Soft Tissue, Other CULTURE, TISSUE AND GRAM STAIN Adelina Ellison [facility] 2020 1759    B : left superficial hallux Tissue Soft Tissue, Other ANAEROBIC CULTURE AND GRAM STAIN Adelina Ellison DPM 2020 1801    C : left deep hallux Tissue Soft Tissue, Other ANAEROBIC CULTURE AND GRAM STAIN EDWIN Nugent 2020 1808    D : left deep hallux Tissue Soft Tissue, Other CULTURE, TISSUE AND GRAM STAIN Adelina Ellison [facility] 2020 1808        Estimated Blood Loss:   Minimal    Drains:  * No LDAs found *    Anesthesia Type:   Choice with 20 ml of 1% Lidocaine and 0 5% Bupivacaine in a 1:1 mixture    Hemostasis:  Pneumatic ankle tourniquet set at 250mmHg for 22 mins    Materials:  3-0 Prolene, Surgicell powder, 1/2 inch plain packing    Operative Findings:  Copious amounts of martir purulence was expressed upon initial excision of the wound located on the plantar aspect of the left hallux   The purulence was noted to course proximally to the 1st MPJ  After disarticulating the hallux, no further purulence was able to be expressed proximally from the flexor tendons, extensor tendons, or plantar arch  Bone proximal to the amputation site was noted to be white in color and of hard/viable quality  The surgical wound was debrided of non viable tissue and adequate bleeding was visualized  Hemostasis was achieved with Surgicell powder and compression  Complications:   None    Procedure and Technique:     Under mild sedation, the patient was brought into the operating room and placed on the operating room table in the supine position  A pneumatic tourniquet was then placed around the patient's left lower extremity with ample webril padding  A time out was performed to confirm the correct patient, procedure and site with all parties in agreement  Following IV sedation, a craig block was performed consisting of 20 ml of 1% Lidocaine and 0 5% Bupivacaine in a 1:1 mixture  The foot was then scrubbed, prepped and draped in the usual aseptic manner  The left lower extremity was elevated for approximately 1 minute to exsangunate the patients foot and the tourniquet was then inflated  Attention was then directed to the wound located on the plantar aspect of the left 1st digit  An elliptical incision was made utilizing a 15 blade to completely excise the wound  Upon surgical dissection, copious amounts of martir purulence was expressed  Thereafter, the surgical incision was extended proximally on the dorsal aspect of the hallux  Utilizing a sterile 15 blade, this incision was carried deep straight to bone  Soft tissue structures were then reflected off the proximal phalanx  All capsular structures were severed and the toe was then disarticulated at the MPJ and then placed on the back table  It was noted that all tissue margins were bleeding and viable  No deep sinus tracts or areas of purulence were visualized   The remaining bone on the proximal aspect of the joint was noted to be of hard and viable quality  Any remaining tendinous structures were identified and severed proximally to remove any nidus of infection  The wound was then cleansed with 3000 L of sterile saline with pulse lavage  Skin closure was then obtained with 3-0 Prolene placed in a interrupted vertical mattress type of fashion  The surgical site was dressed with xeroform, 4x4 gauze, abd pad, Kerlex, ACE wrap  The tourniquet was deflated and normal hyperemic flush was noted to all digits  The patient tolerated the procedure and anesthesia well and was transported to the PACU with vital signs stable  As with many limb salvage procedures, we contemplate the possibility of performing further stages to this procedure  Procedures may include debridements, delayed closure, plastic surgery techniques, or more proximal amputations  This procedure may be considered part of a multi-staged limb salvage treatment plan    [facility] was present during the entire procedure and participated in all key aspects  SIGNATURE: David Lemos DPM  DATE: January 8, 2020  TIME: 7:27 PM      Portions of the record may have been created with voice recognition software  Occasional wrong word or "sound a like" substitutions may have occurred due to the inherent limitations of voice recognition software  Read the chart carefully and recognize, using context, where substitutions have occurred

## 2020-01-09 NOTE — PHYSICAL THERAPY NOTE
PT EVALUATION    75 y o     [id number]    Chills [R68 83]  Leucocytosis [D72 829]  Sore throat [J02 9]  Fatigue [R53 83]  Elevated troponin [R79 89]  ELZBIETA (acute kidney injury) ([facility] [address] ) [N17 9]  Elevated brain natriuretic peptide (BNP) level [R79 89]  Elevated troponin I level [H29 21]  Chronic diastolic (congestive) heart failure (HCC) [I50 32]    Past Medical History:   Diagnosis Date    Arthritis     OA    Bruise of both arms     forearms and both hands    Bruises easily     [facility]     Diabetes mellitus ([address] )     Diabetic foot ulcer ([place] [address] )     Eczema     Erectile dysfunction     Gout     Hyperlipidemia     Hypertension     Murmur     Nephropathy     Osteoarthritis     [facility]AD [facility]peripheral artery disease) ([facility])     Seasonal allergies     Toe infection     bilat great toes    Vertigo     Walks frequently     Wears dentures     upper    Wears glasses          Past Surgical History:   Procedure Laterality Date    CARDIAC PACEMAKER PLACEMENT      CARPAL TUNNEL RELEASE Left     CARPAL TUNNEL RELEASE Right     CARPAL TUNNEL RELEASE      KNEE ARTHROSCOPY Left     KNEE ARTHROSCOPY Right     KNEE SURGERY      FL AMPUTATION TOE,I-P JT Bilateral 5/2/2017    Procedure: PARTIAL AMPUTATION RIGHT AND LEFT HALLUX ;  Surgeon: Faisal Bailey DPM;  Location: [facility];  Service: Podiatry    FL AMPUTATION TOE,MT-P JT Left 7/25/2017    Procedure: 2ND TOE AMPUTATION;  Surgeon: Faisal Bailey DPM;  Location: [facility];  Service: Podiatry    SHOULDER ARTHROSCOPY Left     with screws,RTC    SHOULDER SURGERY      TOE AMPUTATION Left 1/8/2020    Procedure: HALLUX AMPUTATION;  Surgeon: Mami Slaughter DPM;  [facility]: [facility];  Service: Podiatry    VASECTOMY          01/09/20 1037   Note Type   Note type Eval only   Pain Assessment   Pain Assessment No/denies pain   Pain Score No Pain   Home Living   Type of Home House   Home Layout Two level;1/2 bath on main level  (no LAURA, bedroom and 1/2 bath first floor, full bath 2nd fl)   Bathroom Shower/Tub Tub/shower unit   Bathroom Toilet Standard   Bathroom Equipment Shower chair   Bathroom Accessibility Accessible   Home Equipment Walker;Cane   Additional Comments Amb independently without AD   Prior Function   Level of [place] Independent with ADLs and functional mobility   Lives With Spouse; Family   ADL Assistance Independent   IADLs Independent   Falls in the last 6 months 0   Vocational Retired   Restrictions/Precautions   [place] Bearing Precautions Per Order Yes   LLE Weight Bearing Per Order NWB   Other Precautions WBS;O2;Fall Risk  (pt reported anxiety)   General   Family/Caregiver Present No   Cognition   Overall Cognitive Status WFL   Arousal/Participation Alert   Orientation Level Oriented X4   Memory Within functional limits   Following Commands Follows all commands and directions without difficulty   RUE Assessment   RUE Assessment   (Refer to OT eval)   LUE Assessment   LUE Assessment   (Refer to OT eval)   RLE Assessment   RLE Assessment   (grossly 4+/5)   LLE Assessment   LLE Assessment   (grossly 4/5 except ankle/ foot NT)   Bed Mobility   Supine to Sit 7  Independent   Transfers   Sit to Stand 4  Minimal assistance   Additional items Assist x 2;Bedrails; Increased time required;Verbal cues  (demonstration, VCs for NWBing LLE, hand placement, safety  )   Stand to Sit 4  Minimal assistance   Additional items Assist x 2;Bedrails;Verbal cues   Ambulation/Elevation   Gait pattern Excessively slow  (hop to pattern with verbal cues)   Gait Assistance 4  Minimal assist   Additional items Assist x 2;Verbal cues   Assistive Device Rolling walker   Distance 3' fwd/bwd  (pt deferred further attempts at this time due to anxiety)   Balance   Static Sitting Good   Dynamic Sitting Good   Static Standing Fair -   Dynamic Standing Poor +   Ambulatory Poor +   Endurance Deficit   Endurance Deficit Yes   Endurance Deficit Description fatigue   Activity Tolerance   Activity Tolerance Other (Comment)  (limited by pt anxiety)   Medical Staff Made Aware MARGAUX [place]   Nurse Made Aware Liz OTJANELLE   Assessment   Prognosis Excellent   Problem List Decreased endurance; Impaired balance;Decreased mobility; Decreased safety awareness  (surgical restrictions (WBing))   Assessment Pt is a 75year old male admitted to [facility] on [facility]20 with c/o sore throat and malaise x 2-3 days  Pt is being treated for acute respiratory failure with hypoxia and clinical OM left hallux  Pt s/p L hallux amputation on 1/8/20  Pt has a history of CHF, Afib, heart block, pacer, NICOLASA, IDDM, HTN, CKD  PT consulted with eval and treat and activity as tolerated orders  Pt is NWB LLE  Pt lives with his wife and family in a [facility], no LAURA  Bedroom and 1/2 bath on first floor and full bath on 2nd floor  At baseline, pt is independent with ADLs and ambulation without AD, but has a RW and SPC  Upon evaluation, pt performed bed mobility independently, but required min A x 2 for transfers  Pt ambulated 3' fwd/ bwd with RW and min A x 2  Pt demonstrates slow hop-to gait  Pt required verbal cues for technique and safety  Pt presents with weakness, impaired balance, gait dysfunction and decreased safety awareness  PT to follow 5x/wk during acute stay to address deficits  Recommend STR v  Home PT and 24 hour supervision depending on progress to maximize safe mobility and function  If pt returns home, may need W/C for distances while NWBing  Barriers to Discharge None   Goals   Patient Goals to go home   STG Expiration Date 01/19/20   Short Term Goal #1 10 days:1)  Perform all transfers with Annalise demonstrating safe and appropriate technique 100% of the time in order to improve ability to negotiate safely in home environment  2) Amb with RW 36' with mod I in order to demonstrate ability to negotiate in home environment  3)  Improve overall strength and balance 1/2 grade in order to optimize ability to perform functional tasks and reduce fall risk  4) Increase activity tolerance to 45 minutes in order to improve endurance to functional tasks  5)  Negotiate stairs using most appropriate technique and S in order to be able to negotiate safely in home environment  6) PT for ongoing patient and family/caregiver education, DME needs and d/c planning in order to promote highest level of function in least restrictive environment  PT Treatment Day 0   Plan   Treatment/Interventions Functional transfer training; Therapeutic exercise;Elevations; Patient/family training;Equipment eval/education; Bed mobility;Gait training   PT Frequency 5x/wk   Recommendation   Recommendation Short-term skilled PT; Home PT;24 hour supervision/assist   Equipment Recommended Walker; Wheelchair   Additional Comments yes to STR, no to home   Barthel Index   Feeding 10   Bathing 0   Grooming Score 5   Dressing Score 5   Bladder Score 10   Bowels Score 10   Toilet Use Score 5   Transfers (Bed/Chair) Score 10   Mobility (Level Surface) Score 0   Stairs Score 0   Barthel Index Score 55     History: co - morbidities, fall risk, use of assistive device, multiple lines  Exam: impairments in locomotion, musculoskeletal, balance, posture, cardiac, pulmonary   Clinical: unstable/unpredictable  Complexity:high    Lonny Saxena, PT

## 2020-01-09 NOTE — PLAN OF CARE
Problem: Potential for Falls  Goal: Patient will remain free of falls  Description  INTERVENTIONS:  - Assess patient frequently for physical needs  -  Identify cognitive and physical deficits and behaviors that affect risk of falls    -  [place] fall precautions as indicated by assessment   - Educate patient/family on patient safety including physical limitations  - Instruct patient to call for assistance with activity based on assessment  - Modify environment to reduce risk of injury  - Consider OT/PT consult to assist with strengthening/mobility  Outcome: Progressing     Problem: PAIN - ADULT  Goal: Verbalizes/displays adequate comfort level or baseline comfort level  Description  Interventions:  - Encourage patient to monitor pain and request assistance  - Assess pain using appropriate pain scale  - Administer analgesics based on type and severity of pain and evaluate response  - Implement non-pharmacological measures as appropriate and evaluate response  - Consider cultural and social influences on pain and pain management  - Notify physician/advanced practitioner if interventions unsuccessful or patient reports new pain  Outcome: Progressing     Problem: INFECTION - ADULT  Goal: Absence or prevention of progression during hospitalization  Description  INTERVENTIONS:  - Assess and monitor for signs and symptoms of infection  - Monitor lab/diagnostic results  - Monitor all insertion sites, i e  indwelling lines, tubes, and drains  - Administer medications as ordered  - Instruct and encourage patient and family to use good hand hygiene technique  - Identify and instruct in appropriate isolation precautions for identified infection/condition   Outcome: Progressing     Problem: SAFETY ADULT  Goal: Maintain or return to baseline ADL function  Description  INTERVENTIONS:  -  Assess patient's ability to carry out ADLs; assess patient's baseline for ADL function and identify physical deficits which impact ability to perform ADLs (bathing, care of mouth/teeth, toileting, grooming, dressing, etc )  - Assess/evaluate cause of self-care deficits   - Assess range of motion  - Assess patient's mobility; develop plan if impaired  - Assess patient's need for assistive devices and provide as appropriate  - Encourage maximum independence but intervene and supervise when necessary  - Involve family in performance of ADLs  - Assess for home care needs following discharge   - Consider OT consult to assist with ADL evaluation and planning for discharge  - Provide patient education as appropriate  Outcome: Progressing  Goal: Maintain or return mobility status to optimal level  Description  INTERVENTIONS:  - Assess patient's baseline mobility status (ambulation, transfers, stairs, etc )    - Identify cognitive and physical deficits and behaviors that affect mobility  - Identify mobility aids required to assist with transfers and/or ambulation (gait belt, sit-to-stand, lift, walker, cane, etc )  - [place] fall precautions as indicated by assessment  - Record patient progress and toleration of activity level on Mobility SBAR; progress patient to next Phase/Stage  - Instruct patient to call for assistance with activity based on assessment  - Consider rehabilitation consult to assist with strengthening/weightbearing, etc   Outcome: Progressing     Problem: DISCHARGE PLANNING  Goal: Discharge to home or other facility with appropriate resources  Description  INTERVENTIONS:  - Identify barriers to discharge w/patient and caregiver  - Arrange for needed discharge resources and transportation as appropriate  - Identify discharge learning needs (meds, wound care, etc )  - Arrange for interpretive services to assist at discharge as needed  - Refer to Case Management Department for coordinating discharge planning if the patient needs post-hospital services based on physician/advanced practitioner order or complex needs related to functional status, cognitive ability, or social support system  Outcome: Progressing     Problem: Knowledge Deficit  Goal: Patient/family/caregiver demonstrates understanding of disease process, treatment plan, medications, and discharge instructions  Description  Complete learning assessment and assess knowledge base    Interventions:  - Provide teaching at level of understanding  - Provide teaching via preferred learning methods  Outcome: Progressing     Problem: CARDIOVASCULAR - ADULT  Goal: Maintains optimal cardiac output and hemodynamic stability  Description  INTERVENTIONS:  - Monitor I/O, vital signs and rhythm  - Monitor for S/S and trends of decreased cardiac output  - Administer and titrate ordered vasoactive medications to optimize hemodynamic stability  - Assess quality of pulses, skin color and temperature  - Assess for signs of decreased coronary artery perfusion  - Instruct patient to report change in severity of symptoms  Outcome: Progressing  Goal: Absence of cardiac dysrhythmias or at baseline rhythm  Description  INTERVENTIONS:  - Continuous cardiac monitoring, vital signs, obtain 12 lead EKG if ordered  - Administer antiarrhythmic and heart rate control medications as ordered  - Monitor electrolytes and administer replacement therapy as ordered  Outcome: Progressing     Problem: RESPIRATORY - ADULT  Goal: Achieves optimal ventilation and oxygenation  Description  INTERVENTIONS:  - Assess for changes in respiratory status  - Assess for changes in mentation and behavior  - Position to facilitate oxygenation and minimize respiratory effort  - Oxygen administered by appropriate delivery if ordered  - Initiate smoking cessation education as indicated  - Encourage broncho-pulmonary hygiene including cough, deep breathe, Incentive Spirometry  - Assess the need for suctioning and aspirate as needed  - Assess and instruct to report SOB or any respiratory difficulty  - Respiratory Therapy support as indicated  Outcome: Progressing

## 2020-01-09 NOTE — PROGRESS NOTES
Vancomycin IV Pharmacy-to-Dose Consultation    Yana Yepez is a 75 y o  male who is currently receiving Vancomycin IV with management by the Pharmacy Consult service  Assessment/Plan:  The patient was reviewed  Renal function is fluctuating and being monitored appropriately  No signs or symptoms of nephrotoxicity and/or infusion reactions were documented in the chart  Cultures are growing out staph, but susceptibilities have not yet resulted  Based on todays assessment, continue current vancomycin (day # 2) dosing of 1500 mg IV q 24 h , with a plan for trough to be drawn at 1230 on 1/11/2020  We will continue to follow the patients culture results and clinical progress daily      Andrew Hatfield, Pharmacist

## 2020-01-10 ENCOUNTER — APPOINTMENT (INPATIENT)
Dept: NON INVASIVE DIAGNOSTICS | Facility: HOSPITAL | Age: 65
DRG: 853 | End: 2020-01-10
Payer: MEDICARE

## 2020-01-10 LAB
ANION GAP SERPL CALCULATED.3IONS-SCNC: 10 MMOL/L (ref 4–13)
BACTERIA BLD CULT: ABNORMAL
BACTERIA BLD CULT: ABNORMAL
BACTERIA SPEC ANAEROBE CULT: NORMAL
BACTERIA SPEC ANAEROBE CULT: NORMAL
BASOPHILS # BLD AUTO: 0.04 THOUSANDS/ΜL (ref 0–0.1)
BASOPHILS NFR BLD AUTO: 0 % (ref 0–1)
BUN SERPL-MCNC: 83 MG/DL (ref 5–25)
CALCIUM SERPL-MCNC: 8.2 MG/DL (ref 8.3–10.1)
CHLORIDE SERPL-SCNC: 98 MMOL/L (ref 100–108)
CO2 SERPL-SCNC: 21 MMOL/L (ref 21–32)
CREAT SERPL-MCNC: 2.24 MG/DL (ref 0.6–1.3)
EOSINOPHIL # BLD AUTO: 0.26 THOUSAND/ΜL (ref 0–0.61)
EOSINOPHIL NFR BLD AUTO: 3 % (ref 0–6)
ERYTHROCYTE [DISTWIDTH] IN BLOOD BY AUTOMATED COUNT: 14 % (ref 11.6–15.1)
GFR SERPL CREATININE-BSD FRML MDRD: 29 ML/MIN/1.73SQ M
GLUCOSE SERPL-MCNC: 183 MG/DL (ref 65–140)
GLUCOSE SERPL-MCNC: 224 MG/DL (ref 65–140)
GLUCOSE SERPL-MCNC: 268 MG/DL (ref 65–140)
GLUCOSE SERPL-MCNC: 299 MG/DL (ref 65–140)
GLUCOSE SERPL-MCNC: 331 MG/DL (ref 65–140)
GRAM STN SPEC: ABNORMAL
GRAM STN SPEC: ABNORMAL
HCT VFR BLD AUTO: 24.9 % (ref 36.5–49.3)
HGB BLD-MCNC: 8.1 G/DL (ref 12–17)
IMM GRANULOCYTES # BLD AUTO: 0.06 THOUSAND/UL (ref 0–0.2)
IMM GRANULOCYTES NFR BLD AUTO: 1 % (ref 0–2)
LYMPHOCYTES # BLD AUTO: 1.32 THOUSANDS/ΜL (ref 0.6–4.47)
LYMPHOCYTES NFR BLD AUTO: 13 % (ref 14–44)
MCH RBC QN AUTO: 29.9 PG (ref 26.8–34.3)
MCHC RBC AUTO-ENTMCNC: 32.5 G/DL (ref 31.4–37.4)
MCV RBC AUTO: 92 FL (ref 82–98)
MONOCYTES # BLD AUTO: 0.83 THOUSAND/ΜL (ref 0.17–1.22)
MONOCYTES NFR BLD AUTO: 8 % (ref 4–12)
NEUTROPHILS # BLD AUTO: 7.76 THOUSANDS/ΜL (ref 1.85–7.62)
NEUTS SEG NFR BLD AUTO: 75 % (ref 43–75)
NRBC BLD AUTO-RTO: 0 /100 WBCS
PLATELET # BLD AUTO: 248 THOUSANDS/UL (ref 149–390)
PMV BLD AUTO: 9.8 FL (ref 8.9–12.7)
POTASSIUM SERPL-SCNC: 4.1 MMOL/L (ref 3.5–5.3)
RBC # BLD AUTO: 2.71 MILLION/UL (ref 3.88–5.62)
SODIUM SERPL-SCNC: 129 MMOL/L (ref 136–145)
WBC # BLD AUTO: 10.27 THOUSAND/UL (ref 4.31–10.16)

## 2020-01-10 PROCEDURE — 93306 TTE W/DOPPLER COMPLETE: CPT

## 2020-01-10 PROCEDURE — 85025 COMPLETE CBC W/AUTO DIFF WBC: CPT | Performed by: NURSE PRACTITIONER

## 2020-01-10 PROCEDURE — 80048 BASIC METABOLIC PNL TOTAL CA: CPT | Performed by: NURSE PRACTITIONER

## 2020-01-10 PROCEDURE — 99232 SBSQ HOSP IP/OBS MODERATE 35: CPT | Performed by: INTERNAL MEDICINE

## 2020-01-10 PROCEDURE — 97116 GAIT TRAINING THERAPY: CPT

## 2020-01-10 PROCEDURE — 94660 CPAP INITIATION&MGMT: CPT

## 2020-01-10 PROCEDURE — 82948 REAGENT STRIP/BLOOD GLUCOSE: CPT

## 2020-01-10 PROCEDURE — 99232 SBSQ HOSP IP/OBS MODERATE 35: CPT

## 2020-01-10 PROCEDURE — 97535 SELF CARE MNGMENT TRAINING: CPT

## 2020-01-10 PROCEDURE — 97530 THERAPEUTIC ACTIVITIES: CPT

## 2020-01-10 PROCEDURE — 99233 SBSQ HOSP IP/OBS HIGH 50: CPT | Performed by: INTERNAL MEDICINE

## 2020-01-10 RX ORDER — INSULIN GLARGINE 100 [IU]/ML
15 INJECTION, SOLUTION SUBCUTANEOUS
Status: DISCONTINUED | OUTPATIENT
Start: 2020-01-10 | End: 2020-01-11

## 2020-01-10 RX ORDER — FUROSEMIDE 10 MG/ML
40 INJECTION INTRAMUSCULAR; INTRAVENOUS
Status: DISCONTINUED | OUTPATIENT
Start: 2020-01-10 | End: 2020-01-10

## 2020-01-10 RX ORDER — FUROSEMIDE 10 MG/ML
40 INJECTION INTRAMUSCULAR; INTRAVENOUS DAILY
Status: DISCONTINUED | OUTPATIENT
Start: 2020-01-10 | End: 2020-01-13

## 2020-01-10 RX ORDER — CEFAZOLIN SODIUM 2 G/50ML
2000 SOLUTION INTRAVENOUS EVERY 8 HOURS
Status: DISCONTINUED | OUTPATIENT
Start: 2020-01-10 | End: 2020-01-20

## 2020-01-10 RX ADMIN — PRAVASTATIN SODIUM 40 MG: 40 TABLET ORAL at 17:47

## 2020-01-10 RX ADMIN — METOPROLOL TARTRATE 50 MG: 50 TABLET, FILM COATED ORAL at 21:32

## 2020-01-10 RX ADMIN — INSULIN LISPRO 2 UNITS: 100 INJECTION, SOLUTION INTRAVENOUS; SUBCUTANEOUS at 21:35

## 2020-01-10 RX ADMIN — GUAIFENESIN 600 MG: 600 TABLET ORAL at 09:58

## 2020-01-10 RX ADMIN — TAMSULOSIN HYDROCHLORIDE 0.4 MG: 0.4 CAPSULE ORAL at 17:47

## 2020-01-10 RX ADMIN — GUAIFENESIN 600 MG: 600 TABLET ORAL at 21:32

## 2020-01-10 RX ADMIN — INSULIN LISPRO 4 UNITS: 100 INJECTION, SOLUTION INTRAVENOUS; SUBCUTANEOUS at 17:47

## 2020-01-10 RX ADMIN — APIXABAN 5 MG: 5 TABLET, FILM COATED ORAL at 09:58

## 2020-01-10 RX ADMIN — CEFAZOLIN SODIUM 2000 MG: 2 SOLUTION INTRAVENOUS at 21:32

## 2020-01-10 RX ADMIN — INSULIN GLARGINE 15 UNITS: 100 INJECTION, SOLUTION SUBCUTANEOUS at 21:31

## 2020-01-10 RX ADMIN — CEFAZOLIN SODIUM 2000 MG: 2 SOLUTION INTRAVENOUS at 12:24

## 2020-01-10 RX ADMIN — FUROSEMIDE 40 MG: 10 INJECTION, SOLUTION INTRAMUSCULAR; INTRAVENOUS at 12:39

## 2020-01-10 RX ADMIN — ALLOPURINOL 300 MG: 100 TABLET ORAL at 09:58

## 2020-01-10 RX ADMIN — APIXABAN 5 MG: 5 TABLET, FILM COATED ORAL at 21:33

## 2020-01-10 RX ADMIN — INSULIN LISPRO 2 UNITS: 100 INJECTION, SOLUTION INTRAVENOUS; SUBCUTANEOUS at 09:59

## 2020-01-10 RX ADMIN — METOPROLOL TARTRATE 50 MG: 50 TABLET, FILM COATED ORAL at 09:58

## 2020-01-10 RX ADMIN — MELATONIN TAB 3 MG 6 MG: 3 TAB at 21:32

## 2020-01-10 RX ADMIN — AMLODIPINE BESYLATE 10 MG: 10 TABLET ORAL at 09:58

## 2020-01-10 RX ADMIN — INSULIN LISPRO 3 UNITS: 100 INJECTION, SOLUTION INTRAVENOUS; SUBCUTANEOUS at 12:39

## 2020-01-10 NOTE — PROGRESS NOTES
Progress Note - Cardiology   Angelica Abarca 75 y o  male MRN: [id number]  Unit/Bed#: E4 -01 Encounter: [id number]        Problem List:  Principal Problem:    Acute respiratory failure with hypoxia ([address] )  Active Problems:    Eczema    Complete heart block (HCC)    PAF (paroxysmal atrial fibrillation) (HCC)    NICOLASA (obstructive sleep apnea)    Type 2 diabetes mellitus with chronic kidney disease, without long-term current use of insulin (HCC)    Essential hypertension    Acute renal failure superimposed on stage 3 chronic kidney disease (HCC)    Leukocytosis    Elevated troponin I level    Bacteremia due to Gram-positive bacteria    Type 2 diabetes mellitus with diabetic neuropathy, without long-term current use of insulin (HCC)    Pyogenic inflammation of bone (HCC)    Chronic diastolic (congestive) heart failure (HCC)      Asessment:  1  Acute hypoxic respiratory failure:              -primary reason for admission              -suspected PNA +/-mild CHF  2  Chronic diastolic congestive heart failure:              -baseline weight:  215 lb (as OP 12/3/19)              -OP diuretic:  Lasix 40 b i d   3  Moderate aortic and mitral stenosis on echo 05/19  4  Complete heart block, s/p [employer] dual-chamber PPM  5  Hypertension   6  Dyslipidemia  7  Acute on chronic kidney injury:              -creatinine 2 08 on admission              -as low as 1 1 July of 2019  8  Elevated troponin, 0 22 on admission  9  Type 2 diabetes mellitus  10  Paroxysmal atrial fibrillation              -stroke prophylaxis:  Eliquis              -rate control:  Lopressor 50 b i d   11  Hypoalbuminemia, 2 6  12  Acute osteomyelitis of left foot      Plan/ Discussion:  1  He continues to improve and is now only on 2 L of oxygen  Tells me he is putting out a lot of urine with the IV Lasix  Will continue throughout today and re-evaluate tomorrow, hopefully to return to oral Lasix    Unable to assess standing weight due to non weight-bearing status    2  Creatinine continues to improve 2 32 --> 2 24 today  Sodium is 129    3  Infectious disease and podiatry remain involved for Staph bacteremia and recent left hallux amputation       Subjective:  Overall is feeling much better  Breathing comfortably on less oxygen  Has no pain      Vitals:  Vitals:    01/09/20 0556 01/10/20 0555   Weight: 97 3 kg (214 lb 8 1 oz) 97 7 kg (215 lb 6 2 oz)   ,  Vitals:    01/10/20 0547 01/10/20 0548 01/10/20 0555 01/10/20 0743   BP:    137/64   BP Location:    Right arm   Pulse:    63   Resp:    19   Temp:    98 3 °F (36 8 °C)   TempSrc:    Temporal   SpO2: 90% 98%  94%   Weight:   97 7 kg (215 lb 6 2 oz)        Exam:  General:  Alert awake and oriented, no acute distress  Heart:  Regular rate and rhythm, no murmurs    Respiratory effort:  Breathing comfortably on supplemental oxygen 2 L nasal cannula  Lungs:  Still with slight rales in left lower lung base   Lower Limbs:  No edema              Medications:    Current Facility-Administered Medications:     acetaminophen (TYLENOL) tablet 650 mg, 650 mg, Oral, Q6H PRN, Marnette Loud, DPM, 650 mg at 01/08/20 2308    allopurinol (ZYLOPRIM) tablet 300 mg, 300 mg, Oral, QAM, Marnette Loud, DPM, 300 mg at 01/10/20 0958    amLODIPine (NORVASC) tablet 10 mg, 10 mg, Oral, Daily, Marnette Loud, DPM, 10 mg at 01/10/20 7100    apixaban (ELIQUIS) tablet 5 mg, 5 mg, Oral, Q12H, Marnette Loud, DPM, 5 mg at 01/10/20 0449    furosemide (LASIX) injection 40 mg, 40 mg, Intravenous, BID (diuretic), Alyne Kawasaki Hosford, PA-C    guaiFENesin (MUCINEX) 12 hr tablet 600 mg, 600 mg, Oral, BID, Marnette Loud, DPM, 600 mg at 01/10/20 0958    insulin glargine (LANTUS) subcutaneous injection 9 Units 0 09 mL, 9 Units, Subcutaneous, HS, Marnette Loud, DPM, 9 Units at 01/09/20 2206    insulin lispro (HumaLOG) 100 units/mL subcutaneous injection 1-5 Units, 1-5 Units, Subcutaneous, 4x Daily (AC & HS), 2 Units at 01/10/20 0959 **AND** Fingerstick Glucose (POCT), , , 4x Daily AC and at bedtime, Rachael Amen, DPM    ipratropium-albuterol (DUO-NEB) 0 5-2 5 mg/3 mL inhalation solution 3 mL, 3 mL, Nebulization, Q6H PRN, Rachael Amen, DPM, 3 mL at 01/07/20 2025    LORazepam (ATIVAN) tablet 0 5 mg, 0 5 mg, Oral, Q8H PRN, Rachael Amen, DPM    melatonin tablet 6 mg, 6 mg, Oral, HS, Rachael Amen, DPM, 6 mg at 01/09/20 2158    metoprolol tartrate (LOPRESSOR) tablet 50 mg, 50 mg, Oral, Q12H [address], Rachael Amen, DPM, 50 mg at 01/10/20 0958    ondansetron (ZOFRAN) injection 4 mg, 4 mg, Intravenous, Q6H PRN, Rachael Amen, DPM    oxyCODONE (ROXICODONE) IR tablet 5 mg, 5 mg, Oral, Q4H PRN, Rachael Amen, DPM    pravastatin (PRAVACHOL) tablet 40 mg, 40 mg, Oral, Daily With Lilian Cunha, DPM, 40 mg at 01/09/20 1659    sodium chloride 0 9 % infusion, 100 mL/hr, Intravenous, Continuous, Rachael Amen, DPM, Stopped at 01/09/20 0106    tamsulosin (FLOMAX) capsule 0 4 mg, 0 4 mg, Oral, Daily With Dinner, Rachael Amen, DPM, 0 4 mg at 01/09/20 1659    vancomycin (VANCOCIN) 1,500 mg in sodium chloride 0 9 % 250 mL IVPB, 15 mg/kg, Intravenous, Q24H, Rachael Amen, DPM, Last Rate: 166 7 mL/hr at 01/09/20 1217, 1,500 mg at 01/09/20 1217      Labs/Data:        Results from last 7 days   Lab Units 01/10/20  0502 01/09/20  0410 01/08/20  0602   WBC Thousand/uL 10 27* 14 32* 17 09*   HEMOGLOBIN g/dL 8 1* 9 0* 8 4*   HEMATOCRIT % 24 9* 27 8* 25 3*   PLATELETS Thousands/uL 248 286 232     Results from last 7 days   Lab Units 01/10/20  0502 01/09/20  0410 01/08/20  0602 01/07/20  2135 01/07/20  1652   POTASSIUM mmol/L 4 1 4 3 4 4  --   --    CHLORIDE mmol/L 98* 98* 97*  --   --    CO2 mmol/L 21 20* 22  --   --    BUN mg/dL 83* 70* 61*  --   --    TROPONIN I ng/mL  --   --  0 65* 0 56* 0 43*

## 2020-01-10 NOTE — OCCUPATIONAL THERAPY NOTE
Occupational Therapy Treatment Note:         01/10/20 1145   Restrictions/Precautions   Weight Bearing Precautions Per Order Yes   LLE Weight Bearing Per Order NWB   Other Precautions WBS; Fall Risk;O2;Cognitive   Pain Assessment   Pain Assessment No/denies pain   Pain Score No Pain   ADL   Where Assessed Edge of bed   Grooming Assistance 5  Supervision/Setup   Grooming Deficit Setup   LB Dressing Assistance 5  Supervision/Setup   LB Dressing Deficit Setup;Verbal cueing;Supervision/safety; Increased time to complete; Don/doff R sock; Don/doff R shoe   LB Dressing Comments Pt will require further review to achieve safety with clothing management  Toileting Assistance  4  Minimal Assistance   Toileting Deficit Setup;Steadying;Verbal cueing;Supervison/safety; Increased time to complete; Bedside commode;Perineal hygiene;Clothing management down;Clothing management up   Toileting Comments increased A for isaias care    Functional Standing Tolerance   Time 2 mins   Activity SPT  Comments dynamic stand balance activity  Bed Mobility   Supine to Sit 5  Supervision   Additional items Assist x 1   Transfers   Sit to Stand 4  Minimal assistance   Additional items Assist x 1; Armrests; Increased time required;Verbal cues   Stand to Sit 4  Minimal assistance   Additional items Assist x 1; Increased time required;Verbal cues;Armrests   Stand pivot 4  Minimal assistance   Additional items Assist x 1; Armrests; Increased time required;Verbal cues   Toilet transfer 4  Minimal assistance   Additional items Assist x 1; Armrests; Bedrails; Increased time required;Verbal cues; Commode   Additional Comments cues for safety required with functional tasks instance  Functional Mobility   Functional Mobility 4  Minimal assistance   Additional Comments x1   Additional items Rolling walker   Cognition   Overall Cognitive Status WFL   Arousal/Participation Alert; Responsive; Cooperative   Attention Attends with cues to redirect   Orientation Level Oriented X4   Memory Decreased recall of precautions   Following Commands Follows multistep commands with increased time or repetition   Comments Poor insight to current deficits  Pt declining rehab of anykind   Additional Activities   Additional Activities   (reviewed current plan of care  )   Additional Activities Comments Pt reports having no interest in therapies  Activity Tolerance   Activity Tolerance Patient limited by pain; Patient limited by fatigue   Medical Staff Made Aware reported all findings to nursing staff  Assessment   Assessment Pt was seen for skilled OT with focus on completion of self toileting, bed mobility, functional transfers, review of current plan of care and review of fall prevention  Pt with need for repetitive learning to achieve F carry over of RW safety with focus on NWB status in LLE  Pt able to don shoe to RLE without loss of sitting balance  Cues required to avoid pressure into affected LLE with activity  Pt able to carry over sit to stand at [facility] with appropriate weight shift  See above levels of A required for all functional tasks  Pt reports interest in using crutches instead of RW with functional transfers  Forwarded information to [employer]  Pt with improved functional balance following applicationof shoe to RLE  Pt will benefit from further rehab due to noted deficits to promote optimal performance levels with all functional tasks  Plan   Treatment Interventions ADL retraining;Functional transfer training; Endurance training   Goal Expiration Date 01/19/20   OT Treatment Day 1   OT Frequency 3-5x/wk   Recommendation   OT Discharge Recommendation Short Term Rehab  (Pt currently declining STR or home therapies  )   Barthel Index   Feeding 10   Bathing 0   Grooming Score 5   Dressing Score 5   Bladder Score 10   Bowels Score 10   Toilet Use Score 5   Transfers (Bed/Chair) Score 10   Mobility (Level Surface) Score 0   Stairs Score 0   Barthel Index Score 55   [facility] [place]

## 2020-01-10 NOTE — PROGRESS NOTES
Progress Note - Jennifer Reardon 75 y o  male MRN: [id number]    Unit/Bed#: E4 -01 Encounter: [id number]      Subjective: The patient is feeling rather well  He denies chest pain, shortness of breath, cough, wheezing, sputum production, abdominal pain, nausea, or vomiting  He is not having much foot pain  He slept well  He is eating well  He is down to 2 L of oxygen  Physical Exam:   Temp:  [97 2 °F (36 2 °C)-99 1 °F (37 3 °C)] 98 1 °F (36 7 °C)  HR:  [60-63] 62  Resp:  [18-19] 18  BP: (132-147)/(60-70) 147/70    Gen:  Well-developed, well-nourished, in no distress  Neck:  Supple  No lymphadenopathy, goiter, or bruit  Heart:  Regular rhythm  I heard no murmur, gallop, or rub  Lungs:  Clear to auscultation and percussion  No wheezing, rales, or rhonchi    Abd:  Soft with active bowel sounds  No mass, tenderness, or organomegaly  Extremities:  No clubbing, cyanosis, or edema  No calf tenderness  Neuro:  Alert and oriented  No focal sign  Skin:  Warm and dry      LABS:   CBC:   Lab Results   Component Value Date    WBC 10 27 (H) 01/10/2020    HGB 8 1 (L) 01/10/2020    HCT 24 9 (L) 01/10/2020    MCV 92 01/10/2020     01/10/2020    MCH 29 9 01/10/2020    MCHC 32 5 01/10/2020    RDW 14 0 01/10/2020    MPV 9 8 01/10/2020    NRBC 0 01/10/2020   , CMP:   Lab Results   Component Value Date    SODIUM 129 (L) 01/10/2020    K 4 1 01/10/2020    CL 98 (L) 01/10/2020    CO2 21 01/10/2020    BUN 83 (H) 01/10/2020    CREATININE 2 24 (H) 01/10/2020    CALCIUM 8 2 (L) 01/10/2020    EGFR 29 01/10/2020             Assessment/Plan:  1  Staph aureus bacteremia with severe sepsis  2  Left 1st toe osteomyelitis, status post amputation  3  Acute respiratory failure with hypoxia, improving  4  Acute diastolic congestive heart failure, improved  5  Acute renal failure secondary to numbers 1 and 4, slowly improving  6  Type 2 diabetes  7  Obstructive sleep apnea  8  Paroxysmal atrial fibrillation  9  Hyponatremia  10  History of complete heart block, status post pacemaker    The patient has been found to have Staph aureus sensitive to methicillin  Vancomycin has been stopped and he has been started on cefazolin  His heart failure has improved  Furosemide will be reduced  The patient's kidney function is slowly improving  We will continue to monitor this      VTE Pharmacologic Prophylaxis:  Eliquis  VTE Mechanical Prophylaxis: reason for no mechanical VTE prophylaxis Therapeutically anticoagulated

## 2020-01-10 NOTE — PROGRESS NOTES
Progress Note - Infectious Disease   Pedro Gilbert 75 y o  male MRN: [id number]  Unit/Bed#: E4 -01 Encounter: [id number]    Impression/Plan:  1  Sepsis   POA   Fever and leukocytosis  [place] secondary to Staph aureus bacteremia with left hallux wound as presumed source  No other clear source appreciated   He has no GI or  complaints  Chest imaging was representative of pulmonary edema  Despite being systemically ill he has been hemodynamically stable   Today he is afebrile and his WBC count continues trending down  Repeat blood cultures are pending  TTE is pending   -antibiotic as below  -monitor CBC and BMP  -follow up repeat blood cultures  -follow up TTE  -monitor vitals  -supportive care     2  Staph aureus bacteremia   Showing in both sets of patient's initial blood cultures  Suspect patient's left great toe ulcer was source   Patient does have an implanted pacemaker  TTE is pending but he will likely need to pursue ELBERT for complete evaluation  Patient is currently receiving IV vancomycin and he is tolerating without difficulty  Given his updated culture results I recommend we transition him to Cefazolin today  He will eventually need PICC placement for long-term antibiotics but will wait to place until repeat blood cultures are negative >72 hours  Repeat blood cultures are pending   -stop IV vancomycin  -transition to IV cefazolin, 2g q8 hours  -monitor CBC and BMP  -follow up repeat blood cultures  -wait to place PICC until repeat blood cultures are negative >72 hours  -follow up TTE  -monitor vitals     3  Left hallux ulceration   With osteomyelitis as his wound does probe to bone and an x-ray of the left foot was suggestive of bony erosion at the 1st proximal phalanx  Purulence noted during initial podiatry wound exam, concern for possible infectious tenosynovitis   I suspect this was the source of patient's bacteremia above    He is now status post amputation of hallux for presumed surgical cure of the ulcer and osteomyelitis  Intraop wound 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 GPCs  -serial left foot exams  -follow up intraop wound cultures  -local wound care per Podiatry  -continue close follow-up with Podiatry     4  Acute hypoxic respiratory failure   Suspect this is secondary to pulmonary edema and diastolic heart failure   BNP and troponin were elevated upon admission  Chest x-ray and CT of the chest did not show consolidations indicative of a bacterial process  Patient did initially require high-flow O2   Now his O2 saturation is stable on nasal cannula oxygen  He is following closely with Cardiology and is receiving diuresis     -diuresis per Cardiology  -monitor vitals  -monitor respiratory status  -continue close follow-up with Cardiology     5  Type 2 diabetes mellitus with neuropathy   Patient's last hemoglobin A1c was 6 4% on 01/07/2020   This is risk factor for wounds and infection   Recommend tight glycemic control for overall health, especially in the setting of wound infection   -blood glucose management per primary service     6  Chronic diastolic heart failure   In setting of aortic and mitral valve stenosis and complete heart block   He has a [employer] dual chamber pacemaker in place   Now with Staph aureus bacteremia  TTE is pending  He will likely need ELBERT for further assessment for endocarditis/pacer infection  He is following closely with Cardiology  -follow up TTE  -continue follow-up with Cardiology     7  Paroxysmal AFib   On Eliquis      We will continue to follow along with the patient  He will be formally reassessed by the infectious disease team on Monday, 1/13/2020  Please call sooner with questions or concerns  Above plan was discussed in detail with patient and his wife at the bedside  Above plan was discussed in detail with [facility] attending, Dr John Vidal  Antibiotics:  Vancomycin 3  Antibiotics 4    Subjective:  Patient reports he is feeling well today  He has no acute complaints   He dneies fever, chills, sweats, shakes; no nausea, vomiting, abdominal pain, diarrhea, or dysuria; no cough, shortness of breath, or chest pain  No pain in his left foot today  No concerns with his dressing  Objective:  Vitals:  Temp:  [97 2 °F (36 2 °C)-99 1 °F (37 3 °C)] 98 4 °F (36 9 °C)  HR:  [60-63] 60  Resp:  [18] 18  BP: (122-143)/(57-65) 133/60  SpO2:  [90 %-98 %] 98 %  Temp (24hrs), Av 2 °F (36 8 °C), Min:97 2 °F (36 2 °C), Max:99 1 °F (37 3 °C)  Current: Temperature: 98 4 °F (36 9 °C)    Physical Exam:   General Appearance:  Alert, interactive, nontoxic, no acute distress  Patient is wearing his glasses  Throat: Oropharynx moist without lesions  Lungs:   Clear to auscultation bilaterally; no wheezes, rhonchi or rales; respirations unlabored; patient is on room air   Heart:  RRR; +murmur, no rub or gallop   Abdomen:   Soft, non-tender, non-distended, positive bowel sounds  Extremities: No clubbing or cyanosis, no edema; L foot dressing wrapped in ace, is clean and intact with no breakthrough drainage   Skin: No new rashes, lesions, or draining wounds noted on exposed skin  Labs, Imaging, & Other studies:   All pertinent labs and imaging studies were personally reviewed  Results from last 7 days   Lab Units 01/10/20  0502 20  0410 20  0602   WBC Thousand/uL 10 27* 14 32* 17 09*   HEMOGLOBIN g/dL 8 1* 9 0* 8 4*   PLATELETS Thousands/uL 248 286 232     Results from last 7 days   Lab Units 01/10/20  0502  20  1207   POTASSIUM mmol/L 4 1   < > 4 3   CHLORIDE mmol/L 98*   < > 96*   CO2 mmol/L 21   < > 21   BUN mg/dL 83*   < > 51*   CREATININE mg/dL 2 24*   < > 2 08*   EGFR ml/min/1 73sq m 29   < > 32   CALCIUM mg/dL 8 2*   < > 8 7   AST U/L  --   --  24   ALT U/L  --   --  24   ALK PHOS U/L  --   --  83    < > = values in this interval not displayed       Results from last 7 days   Lab Units 20  0413 20  0411 20  1808 20  1759 20  0110 01/07/20  1329   BLOOD CULTURE  Received in Microbiology Lab  Culture in Progress  Received in Microbiology Lab  Culture in Progress    --   --   --  Staphylococcus aureus*  Staphylococcus aureus*   GRAM STAIN RESULT   --   --  Rare Polys*  Rare Gram positive cocci in pairs* 1+ Disintegrating polys*  1+ Gram positive cocci in pairs*  1+ Gram positive cocci in clusters*  --  Gram positive cocci in clusters*  Gram positive cocci in clusters*   LEGIONELLA URINARY ANTIGEN   --   --   --   --  Negative  --

## 2020-01-10 NOTE — PROGRESS NOTES
Progress Note - Podiatry  Cesar Webster 75 y o  male MRN: [id number]  Unit/Bed#: E4 -01 Encounter: [id number]    Assessment:  1  Left hallux wound with clinical OM, abscess, and possible infectious tenosynovitis   - S/P left hallux amputaiton (DOS: 1/8/20)  2  Sepsis - POA  3  Bacteremia likely secondary to #1  4  DM type 2    Plan:  · Dressing change today  Surgical incision continues to be stable  No sanguinous drainage  Presumed surgical cure  · Okay to restart Eliquis tonight  · Nonweightbearing left lower extremity  · Continue with IV ancef per ID  · Patient is stable for discharge from Podiatry standpoint  Patient will follow up with Dr Gonzalo Galdamez on an outpatient basis  · Rest of care per primary service    Subjective/Objective   Chief Complaint:   Chief Complaint   Patient presents with    Fatigue     Pt with multiple complaints: sore throat, diarrhea, fatigue, fevers, tylenol at 0930  -ill contacts  Subjective: 75 y o  y/o male was seen and evaluated at bedside in no acute distress, nontoxic in appearance  He reports feeling much better then the past 2 days  Denies any pain in the left foot  Patient denies any recent nausea, vomiting, fever, chills, shortness of breath, chest pains  Blood pressure 147/70, pulse 62, temperature 98 1 °F (36 7 °C), temperature source Temporal, resp  rate 18, weight 97 7 kg (215 lb 6 2 oz), SpO2 96 %  ,Body mass index is 31 19 kg/m²  Invasive Devices     Peripheral Intravenous Line            Peripheral IV 01/07/20 Right Antecubital 3 days                Physical Exam:   General: Alert, cooperative and no distress    Lower Extremity Exam:     NVS at baseline B/l  MSK function at baseline B/l  No calf tenderness noted B/l  LLE:  Dressing was c/d/i with no strike through to the outer dressing noted  Surgical incision is well coapted with sutures intact  Steri-Strips intact  No cyst track active drainage noted    Surrounding erythema still present, but no warmth  No acute signs of infection capillary refill time to the dorsal and plantar flaps are brisk  Left foot      Lab, Imaging and other studies:   I have personally reviewed pertinent lab results  , CBC:   Lab Results   Component Value Date    WBC 10 27 (H) 01/10/2020    HGB 8 1 (L) 01/10/2020    HCT 24 9 (L) 01/10/2020    MCV 92 01/10/2020     01/10/2020    MCH 29 9 01/10/2020    MCHC 32 5 01/10/2020    RDW 14 0 01/10/2020    MPV 9 8 01/10/2020    NRBC 0 01/10/2020   , CMP:   Lab Results   Component Value Date    SODIUM 129 (L) 01/10/2020    K 4 1 01/10/2020    CL 98 (L) 01/10/2020    CO2 21 01/10/2020    BUN 83 (H) 01/10/2020    CREATININE 2 24 (H) 01/10/2020    CALCIUM 8 2 (L) 01/10/2020    EGFR 29 01/10/2020       Imaging: I have personally reviewed pertinent films in PACS  EKG, Pathology, and Other Studies: I have personally reviewed pertinent reports  Portions of the record may have been created with voice recognition software  Occasional wrong word or "sound a like" substitutions may have occurred due to the inherent limitations of voice recognition software  Read the chart carefully and recognize, using context, where substitutions have occurred

## 2020-01-10 NOTE — PLAN OF CARE
Problem: PHYSICAL THERAPY ADULT  Goal: Performs mobility at highest level of function for planned discharge setting  See evaluation for individualized goals  Description  Treatment/Interventions: Functional transfer training, Therapeutic exercise, Elevations, Patient/family training, Equipment eval/education, Bed mobility, Gait training  Equipment Recommended: Reg Leo, Wheelchair       See flowsheet documentation for full assessment, interventions and recommendations  Outcome: Progressing  Note:   Prognosis: Good  Problem List: Decreased strength, Decreased endurance, Impaired balance, Decreased mobility, Decreased skin integrity(surgical restrictions (NWB)  )  Assessment: PT see for PT interventions with focus on functional mobility training, pt education, and issuance of b/l le HEP  Pt seated at edge of bed upon arrival  Pt  Performs sit to stand transfers with min assist x1 with verbal cues for handplacement, cues to scoot to Edge of bed and cues for reminders to maintain NWB to L le  Pt performs stand pivot transfers with min assist x1 with cues to move slowly, cues for improved upright posture, and to maintain NWB to L le  Pt was able to ambulate 8' x2 with min assist x1 with seated rest break between gait trials  Pt is limited by fatigue, generalized weakness and deconditioning  Pt s' O2 sat on 1l 88-89% with mobility training  O2 increased to 2l and pt 's O2 sat increased to 92%  Pt is able to maintain NWb to L le with transfers and ambulation  Pt would benefit from STR at this time, however pt ,'s goal is to return home at d/c  IF home Pt must achieve PT goals and recommend 24 hour supervision, HHPT and w/c for mobilizing longer distances and in community    Barriers to Discharge: None     Recommendation: Short-term skilled PT, Home PT, 24 hour supervision/assist, Home with family support(STR vs home with support and HHPT)     PT - OK to Discharge: (no to home yes to rehab)    See flowsheet documentation for full assessment

## 2020-01-10 NOTE — PLAN OF CARE
Problem: Potential for Falls  Goal: Patient will remain free of falls  Description  INTERVENTIONS:  - Assess patient frequently for physical needs  -  Identify cognitive and physical deficits and behaviors that affect risk of falls    -  [place] fall precautions as indicated by assessment   - Educate patient/family on patient safety including physical limitations  - Instruct patient to call for assistance with activity based on assessment  - Modify environment to reduce risk of injury  - Consider OT/PT consult to assist with strengthening/mobility  Outcome: Progressing     Problem: PAIN - ADULT  Goal: Verbalizes/displays adequate comfort level or baseline comfort level  Description  Interventions:  - Encourage patient to monitor pain and request assistance  - Assess pain using appropriate pain scale  - Administer analgesics based on type and severity of pain and evaluate response  - Implement non-pharmacological measures as appropriate and evaluate response  - Consider cultural and social influences on pain and pain management  - Notify physician/advanced practitioner if interventions unsuccessful or patient reports new pain  Outcome: Progressing     Problem: INFECTION - ADULT  Goal: Absence or prevention of progression during hospitalization  Description  INTERVENTIONS:  - Assess and monitor for signs and symptoms of infection  - Monitor lab/diagnostic results  - Monitor all insertion sites, i e  indwelling lines, tubes, and drains  - Administer medications as ordered  - Instruct and encourage patient and family to use good hand hygiene technique  - Identify and instruct in appropriate isolation precautions for identified infection/condition   Outcome: Progressing     Problem: SAFETY ADULT  Goal: Maintain or return to baseline ADL function  Description  INTERVENTIONS:  -  Assess patient's ability to carry out ADLs; assess patient's baseline for ADL function and identify physical deficits which impact ability to perform ADLs (bathing, care of mouth/teeth, toileting, grooming, dressing, etc )  - Assess/evaluate cause of self-care deficits   - Assess range of motion  - Assess patient's mobility; develop plan if impaired  - Assess patient's need for assistive devices and provide as appropriate  - Encourage maximum independence but intervene and supervise when necessary  - Involve family in performance of ADLs  - Assess for home care needs following discharge   - Consider OT consult to assist with ADL evaluation and planning for discharge  - Provide patient education as appropriate  Outcome: Progressing  Goal: Maintain or return mobility status to optimal level  Description  INTERVENTIONS:  - Assess patient's baseline mobility status (ambulation, transfers, stairs, etc )    - Identify cognitive and physical deficits and behaviors that affect mobility  - Identify mobility aids required to assist with transfers and/or ambulation (gait belt, sit-to-stand, lift, walker, cane, etc )  - [place] fall precautions as indicated by assessment  - Record patient progress and toleration of activity level on Mobility SBAR; progress patient to next Phase/Stage  - Instruct patient to call for assistance with activity based on assessment  - Consider rehabilitation consult to assist with strengthening/weightbearing, etc   Outcome: Progressing     Problem: DISCHARGE PLANNING  Goal: Discharge to home or other facility with appropriate resources  Description  INTERVENTIONS:  - Identify barriers to discharge w/patient and caregiver  - Arrange for needed discharge resources and transportation as appropriate  - Identify discharge learning needs (meds, wound care, etc )  - Arrange for interpretive services to assist at discharge as needed  - Refer to Case Management Department for coordinating discharge planning if the patient needs post-hospital services based on physician/advanced practitioner order or complex needs related to functional status, cognitive ability, or social support system  Outcome: Progressing     Problem: Knowledge Deficit  Goal: Patient/family/caregiver demonstrates understanding of disease process, treatment plan, medications, and discharge instructions  Description  Complete learning assessment and assess knowledge base    Interventions:  - Provide teaching at level of understanding  - Provide teaching via preferred learning methods  Outcome: Progressing     Problem: CARDIOVASCULAR - ADULT  Goal: Maintains optimal cardiac output and hemodynamic stability  Description  INTERVENTIONS:  - Monitor I/O, vital signs and rhythm  - Monitor for S/S and trends of decreased cardiac output  - Administer and titrate ordered vasoactive medications to optimize hemodynamic stability  - Assess quality of pulses, skin color and temperature  - Assess for signs of decreased coronary artery perfusion  - Instruct patient to report change in severity of symptoms  Outcome: Progressing  Goal: Absence of cardiac dysrhythmias or at baseline rhythm  Description  INTERVENTIONS:  - Continuous cardiac monitoring, vital signs, obtain 12 lead EKG if ordered  - Administer antiarrhythmic and heart rate control medications as ordered  - Monitor electrolytes and administer replacement therapy as ordered  Outcome: Progressing     Problem: RESPIRATORY - ADULT  Goal: Achieves optimal ventilation and oxygenation  Description  INTERVENTIONS:  - Assess for changes in respiratory status  - Assess for changes in mentation and behavior  - Position to facilitate oxygenation and minimize respiratory effort  - Oxygen administered by appropriate delivery if ordered  - Initiate smoking cessation education as indicated  - Encourage broncho-pulmonary hygiene including cough, deep breathe, Incentive Spirometry  - Assess the need for suctioning and aspirate as needed  - Assess and instruct to report SOB or any respiratory difficulty  - Respiratory Therapy support as indicated  Outcome: Progressing

## 2020-01-10 NOTE — PLAN OF CARE
Problem: OCCUPATIONAL THERAPY ADULT  Goal: Performs self-care activities at highest level of function for planned discharge setting  See evaluation for individualized goals  Description  Treatment Interventions: ADL retraining, UE strengthening/ROM, Endurance training, Patient/family training, Equipment evaluation/education, Compensatory technique education, Energy conservation, Activityengagement          See flowsheet documentation for full assessment, interventions and recommendations  Outcome: Progressing  Note:   Limitation: Decreased ADL status, Decreased Safe judgement during ADL, Decreased cognition, Decreased endurance, Decreased self-care trans, Decreased high-level ADLs  Prognosis: Good  Assessment: Pt was seen for skilled OT with focus on completion of self toileting, bed mobility, functional transfers, review of current plan of care and review of fall prevention  Pt with need for repetitive learning to achieve F carry over of RW safety with focus on NWB status in LLE  Pt able to don shoe to RLE without loss of sitting balance  Cues required to avoid pressure into affected LLE with activity  Pt able to carry over sit to stand at [facility] with appropriate weight shift  See above levels of A required for all functional tasks  Pt reports interest in using crutches instead of RW with functional transfers  Forwarded information to [employer]  Pt with improved functional balance following applicationof shoe to RLE  Pt will benefit from further rehab due to noted deficits to promote optimal performance levels with all functional tasks        OT Discharge Recommendation: Short Term Rehab(Pt currently declining STR or home therapies  )

## 2020-01-10 NOTE — PHYSICAL THERAPY NOTE
Physical Therapy Treatment Note     01/10/20 1213   Pain Assessment   Pain Assessment No/denies pain   Pain Score No Pain   Restrictions/Precautions   LLE Weight Bearing Per Order NWB   Other Precautions WBS; Fall Risk;O2;Multiple lines   General   Family/Caregiver Present Yes  (wife at beginning of session only)   Cognition   Overall Cognitive Status WFL   Arousal/Participation Alert; Responsive; Cooperative   Attention Within functional limits   Orientation Level Oriented X4   Memory Within functional limits   Following Commands Follows all commands and directions without difficulty   Subjective   Subjective PT reports no pain  PT reports being fearful of standing up  Transfers   Sit to Stand 4  Minimal assistance   Additional items Assist x 1; Increased time required;Verbal cues; Bedrails;Armrests   Stand to Sit 4  Minimal assistance   Additional items Assist x 1; Armrests; Bedrails; Increased time required;Verbal cues   Stand pivot 4  Minimal assistance   Additional items Assist x 1; Increased time required;Verbal cues   Ambulation/Elevation   Gait pattern Poor UE support; Forward Flexion; Excessively slow; Short stride  (hop to gait pattern)   Gait Assistance 4  Minimal assist   Additional items Assist x 1;Verbal cues   Assistive Device Rolling walker   Distance 8' x2 seated rest break between gait trials  Balance   Static Sitting Good   Dynamic Sitting Good   Static Standing Fair -   Dynamic Standing Poor +   Ambulatory Fair -   Endurance Deficit   Endurance Deficit Yes   Endurance Deficit Description fatigue, generlized weakness   Activity Tolerance   Activity Tolerance Patient limited by fatigue   Equipment Use   Comments Pt issued writen HEP  discussed PT pOC, PT goals for d/c to home, IF home rec  HHPT, and w/c for home  Assessment   Prognosis Good   Problem List Decreased strength;Decreased endurance; Impaired balance;Decreased mobility; Decreased skin integrity  (surgical restrictions (NWB)  ) Assessment PT see for PT interventions with focus on functional mobility training, pt education, and issuance of b/l le HEP  Pt seated at edge of bed upon arrival  Pt  Performs sit to stand transfers with min assist x1 with verbal cues for handplacement, cues to scoot to Edge of bed and cues for reminders to maintain NWB to L le  Pt performs stand pivot transfers with min assist x1 with cues to move slowly, cues for improved upright posture, and to maintain NWB to L le  Pt was able to ambulate 8' x2 with min assist x1 with seated rest break between gait trials  Pt is limited by fatigue, generalized weakness and deconditioning  Pt s' O2 sat on 1l 88-89% with mobility training  O2 increased to 2l and pt 's O2 sat increased to 92%  Pt is able to maintain NWb to L le with transfers and ambulation  Pt would benefit from STR at this time, however pt ,'s goal is to return home at d/c  IF home Pt must achieve PT goals and recommend 24 hour supervision, HHPT and w/c for mobilizing longer distances and in community  Goals   Patient Goals " to go home "     STG Expiration Date 01/19/20   PT Treatment Day 1   Plan   Treatment/Interventions Functional transfer training; Therapeutic exercise; Endurance training;Patient/family training;Equipment eval/education; Bed mobility;Gait training;OT   Progress Slow progress, decreased activity tolerance   PT Frequency 5x/wk   Recommendation   Recommendation Short-term skilled PT; Home PT;24 hour supervision/assist;Home with family support  (STR vs home with support and HHPT)   PT - OK to Discharge   (no to home yes to rehab)        01/10/20 1213   Pain Assessment   Pain Assessment No/denies pain   Pain Score No Pain   Restrictions/Precautions   LLE Weight Bearing Per Order NWB   Other Precautions WBS; Fall Risk;O2;Multiple lines   General   Family/Caregiver Present Yes  (wife at beginning of session only)   Cognition   Overall Cognitive Status [facility]   Arousal/Participation Alert; Responsive; Cooperative   Attention Within functional limits   Orientation Level Oriented X4   Memory Within functional limits   Following Commands Follows all commands and directions without difficulty   Subjective   Subjective PT reports no pain  PT reports being fearful of standing up  Transfers   Sit to Stand 4  Minimal assistance   Additional items Assist x 1; Increased time required;Verbal cues; Bedrails;Armrests   Stand to Sit 4  Minimal assistance   Additional items Assist x 1; Armrests; Bedrails; Increased time required;Verbal cues   Stand pivot 4  Minimal assistance   Additional items Assist x 1; Increased time required;Verbal cues   Ambulation/Elevation   Gait pattern Poor UE support; Forward Flexion; Excessively slow; Short stride  (hop to gait pattern)   Gait Assistance 4  Minimal assist   Additional items Assist x 1;Verbal cues   Assistive Device Rolling walker   Distance 8' x2 seated rest break between gait trials  Balance   Static Sitting Good   Dynamic Sitting Good   Static Standing Fair -   Dynamic Standing Poor +   Ambulatory Fair -   Endurance Deficit   Endurance Deficit Yes   Endurance Deficit Description fatigue, generlized weakness   Activity Tolerance   Activity Tolerance Patient limited by fatigue   Equipment Use   Comments Pt issued writen HEP  discussed PT pOC, PT goals for d/c to home, IF home rec  HHPT, and w/c for home  Assessment   Prognosis Good   Problem List Decreased strength;Decreased endurance; Impaired balance;Decreased mobility; Decreased skin integrity  (surgical restrictions (NWB)  )   Assessment PT see for PT interventions with focus on functional mobility training, pt education, and issuance of b/l le HEP  Pt seated at edge of bed upon arrival  Pt  Performs sit to stand transfers with min assist x1 with verbal cues for handplacement, cues to scoot to Edge of bed and cues for reminders to maintain NWB to L le    Pt performs stand pivot transfers with min assist x1 with cues to move slowly, cues for improved upright posture, and to maintain NWB to L le  Pt was able to ambulate 8' x2 with min assist x1 with seated rest break between gait trials  Pt is limited by fatigue, generalized weakness and deconditioning  Pt s' O2 sat on 1l 88-89% with mobility training  O2 increased to 2l and pt 's O2 sat increased to 92%  Pt is able to maintain NWb to L le with transfers and ambulation  Pt would benefit from STR at this time, however pt ,'s goal is to return home at d/c  IF home Pt must achieve PT goals and recommend 24 hour supervision, HHPT and w/c for mobilizing longer distances and in community  Goals   Patient Goals " to go home "     STG Expiration Date 01/19/20   PT Treatment Day 1   Plan   Treatment/Interventions Functional transfer training; Therapeutic exercise; Endurance training;Patient/family training;Equipment eval/education; Bed mobility;Gait training;OT   Progress Slow progress, decreased activity tolerance   PT Frequency 5x/wk   Recommendation   Recommendation Short-term skilled PT; Home PT;24 hour supervision/assist;Home with family support  (STR vs home with support and HHPT)   PT - OK to Discharge   (no to home yes to rehab)         Ami Mojica, KAITLIN

## 2020-01-11 ENCOUNTER — ANESTHESIA EVENT (INPATIENT)
Dept: PERIOP | Facility: HOSPITAL | Age: 65
DRG: 853 | End: 2020-01-11
Payer: MEDICARE

## 2020-01-11 LAB
BACTERIA TISS AEROBE CULT: ABNORMAL
GLUCOSE SERPL-MCNC: 201 MG/DL (ref 65–140)
GLUCOSE SERPL-MCNC: 275 MG/DL (ref 65–140)
GLUCOSE SERPL-MCNC: 286 MG/DL (ref 65–140)
GLUCOSE SERPL-MCNC: 296 MG/DL (ref 65–140)
GRAM STN SPEC: ABNORMAL

## 2020-01-11 PROCEDURE — 82948 REAGENT STRIP/BLOOD GLUCOSE: CPT

## 2020-01-11 PROCEDURE — 99232 SBSQ HOSP IP/OBS MODERATE 35: CPT | Performed by: INTERNAL MEDICINE

## 2020-01-11 PROCEDURE — 94760 N-INVAS EAR/PLS OXIMETRY 1: CPT

## 2020-01-11 PROCEDURE — 94660 CPAP INITIATION&MGMT: CPT

## 2020-01-11 RX ORDER — INSULIN GLARGINE 100 [IU]/ML
18 INJECTION, SOLUTION SUBCUTANEOUS
Status: DISCONTINUED | OUTPATIENT
Start: 2020-01-11 | End: 2020-01-13

## 2020-01-11 RX ORDER — SODIUM CHLORIDE, SODIUM LACTATE, POTASSIUM CHLORIDE, CALCIUM CHLORIDE 600; 310; 30; 20 MG/100ML; MG/100ML; MG/100ML; MG/100ML
75 INJECTION, SOLUTION INTRAVENOUS CONTINUOUS
Status: DISCONTINUED | OUTPATIENT
Start: 2020-01-12 | End: 2020-01-12

## 2020-01-11 RX ADMIN — INSULIN LISPRO 6 UNITS: 100 INJECTION, SOLUTION INTRAVENOUS; SUBCUTANEOUS at 11:49

## 2020-01-11 RX ADMIN — ALLOPURINOL 300 MG: 100 TABLET ORAL at 08:10

## 2020-01-11 RX ADMIN — METOPROLOL TARTRATE 50 MG: 50 TABLET, FILM COATED ORAL at 21:41

## 2020-01-11 RX ADMIN — TAMSULOSIN HYDROCHLORIDE 0.4 MG: 0.4 CAPSULE ORAL at 17:51

## 2020-01-11 RX ADMIN — INSULIN LISPRO 6 UNITS: 100 INJECTION, SOLUTION INTRAVENOUS; SUBCUTANEOUS at 17:51

## 2020-01-11 RX ADMIN — AMLODIPINE BESYLATE 10 MG: 10 TABLET ORAL at 08:10

## 2020-01-11 RX ADMIN — CEFAZOLIN SODIUM 2000 MG: 2 SOLUTION INTRAVENOUS at 04:53

## 2020-01-11 RX ADMIN — METOPROLOL TARTRATE 50 MG: 50 TABLET, FILM COATED ORAL at 08:10

## 2020-01-11 RX ADMIN — INSULIN LISPRO 1 UNITS: 100 INJECTION, SOLUTION INTRAVENOUS; SUBCUTANEOUS at 08:10

## 2020-01-11 RX ADMIN — SODIUM CHLORIDE, SODIUM LACTATE, POTASSIUM CHLORIDE, AND CALCIUM CHLORIDE 75 ML/HR: .6; .31; .03; .02 INJECTION, SOLUTION INTRAVENOUS at 23:59

## 2020-01-11 RX ADMIN — CEFAZOLIN SODIUM 2000 MG: 2 SOLUTION INTRAVENOUS at 10:17

## 2020-01-11 RX ADMIN — PRAVASTATIN SODIUM 40 MG: 40 TABLET ORAL at 17:51

## 2020-01-11 RX ADMIN — APIXABAN 5 MG: 5 TABLET, FILM COATED ORAL at 08:10

## 2020-01-11 RX ADMIN — CEFAZOLIN SODIUM 2000 MG: 2 SOLUTION INTRAVENOUS at 17:51

## 2020-01-11 RX ADMIN — INSULIN GLARGINE 18 UNITS: 100 INJECTION, SOLUTION SUBCUTANEOUS at 21:41

## 2020-01-11 RX ADMIN — FUROSEMIDE 40 MG: 10 INJECTION, SOLUTION INTRAMUSCULAR; INTRAVENOUS at 08:10

## 2020-01-11 RX ADMIN — GUAIFENESIN 600 MG: 600 TABLET ORAL at 08:10

## 2020-01-11 RX ADMIN — MELATONIN TAB 3 MG 6 MG: 3 TAB at 21:42

## 2020-01-11 RX ADMIN — GUAIFENESIN 600 MG: 600 TABLET ORAL at 21:43

## 2020-01-11 NOTE — PROGRESS NOTES
[facility] - Progress Note  Patient: Verlyn Boas 75 y o  male   MRN: [id number]  PCP: Ryley Pacheco DO  Unit/Bed#: E4 -01 Encounter: [id number]  Date Of Visit: 20    ASSESSMENT:    Verlyn Boas is a 75 y o  male with:    1  S/p left hallux amputation (DOS 2020) due to underlying clinical OM, abscess, and possible infectious tenosynovitis   2  Sepsis - POA  3  Bacteremia likely secondary to #1  4  DM type 2      PLAN:    · Patient to be taken to OR tomorrow, 2020, for repeat I&D of left foot with possible application of wound vac  · Eliquis held at this time  Anticoagulation per Cardiology  · NPO at midnight  · Continue nonweightbearing to the left lower extremity  · WBC down trending  Continue with current antibiotic regimen of IV cefazolin, per Infectious Disease  PICC line to be placed when blood cultures are negative >72 hrs  · Rest of care per primary team        SUBJECTIVE:     Chief Complaint:   Chief Complaint   Patient presents with    Fatigue     Pt with multiple complaints: sore throat, diarrhea, fatigue, fevers, tylenol at 0930  -ill contacts  The patient was seen, evaluated, and assessed at bedside today  The patient was awake, alert, and in no acute distress  No acute events overnight  Patient denies N/V/F/chills/SOB/CP  OBJECTIVE:     Vitals:   /61 (BP Location: Left arm)   Pulse 60   Temp 98 2 °F (36 8 °C) (Temporal)   Resp 18   Wt 98 1 kg (216 lb 4 3 oz)   SpO2 96%   BMI 31 31 kg/m²     Temp (24hrs), Av 2 °F (36 8 °C), Min:97 8 °F (36 6 °C), Max:98 8 °F (37 1 °C)      Physical Exam  Outer dressings CDI  Cardiovascular status at baseline  Neurological status at baseline  Surgical site located at the left hallux  Skin margins well approximated, sutures intact, no clinical signs of dehiscence noted  Capillary refill time to dorsal and plantar flaps WNL  Skin temperature at the left lower extremity warm to warm   Copious serosanguinous drainage expressed from dorsal proximal incision site  Trace amounts of purulence noted from plantar medial incision margin  Clinical Images 01/11/20:    Left foot    Additional Data:     Labs:    Results from last 7 days   Lab Units 01/10/20  0502   WBC Thousand/uL 10 27*   HEMOGLOBIN g/dL 8 1*   HEMATOCRIT % 24 9*   PLATELETS Thousands/uL 248   NEUTROS PCT % 75   LYMPHS PCT % 13*   MONOS PCT % 8   EOS PCT % 3     Results from last 7 days   Lab Units 01/10/20  0502  01/07/20  1207   POTASSIUM mmol/L 4 1   < > 4 3   CHLORIDE mmol/L 98*   < > 96*   CO2 mmol/L 21   < > 21   BUN mg/dL 83*   < > 51*   CREATININE mg/dL 2 24*   < > 2 08*   CALCIUM mg/dL 8 2*   < > 8 7   ALK PHOS U/L  --   --  83   ALT U/L  --   --  24   AST U/L  --   --  24    < > = values in this interval not displayed  Results from last 7 days   Lab Units 01/07/20  1207   INR  1 89*       * I Have Reviewed All Lab Data Listed Above  Recent Cultures (last 7 days):     Results from last 7 days   Lab Units 01/09/20  0413 01/09/20  0411 01/08/20  1808 01/08/20  1759 01/08/20  0110 01/07/20  1329   BLOOD CULTURE  No Growth at 24 hrs  No Growth at 24 hrs   --   --   --  Staphylococcus aureus*  Staphylococcus aureus*   GRAM STAIN RESULT   --   --  Rare Polys*  Rare Gram positive cocci in pairs* 1+ Disintegrating polys*  1+ Gram positive cocci in pairs*  1+ Gram positive cocci in clusters*  --  Gram positive cocci in clusters*  Gram positive cocci in clusters*   LEGIONELLA URINARY ANTIGEN   --   --   --   --  Negative  --        Imaging: I have personally reviewed pertinent films in PACS  EKG, Pathology, and Other Studies: I have personally reviewed pertinent reports  Today, Patient Was Seen By: Mendel Haley DPM    ** Please Note: Portions of the record may have been created with voice recognition software   Occasional wrong word or "sound a like" substitutions may have occurred due to the inherent limitations of voice recognition software  Read the chart carefully and recognize, using context, where substitutions have occurred   **

## 2020-01-11 NOTE — PLAN OF CARE
Problem: Potential for Falls  Goal: Patient will remain free of falls  Description  INTERVENTIONS:  - Assess patient frequently for physical needs  -  Identify cognitive and physical deficits and behaviors that affect risk of falls    -  Calder fall precautions as indicated by assessment   - Educate patient/family on patient safety including physical limitations  - Instruct patient to call for assistance with activity based on assessment  - Modify environment to reduce risk of injury  - Consider OT/PT consult to assist with strengthening/mobility  Outcome: Progressing     Problem: PAIN - ADULT  Goal: Verbalizes/displays adequate comfort level or baseline comfort level  Description  Interventions:  - Encourage patient to monitor pain and request assistance  - Assess pain using appropriate pain scale  - Administer analgesics based on type and severity of pain and evaluate response  - Implement non-pharmacological measures as appropriate and evaluate response  - Consider cultural and social influences on pain and pain management  - Notify physician/advanced practitioner if interventions unsuccessful or patient reports new pain  Outcome: Progressing     Problem: INFECTION - ADULT  Goal: Absence or prevention of progression during hospitalization  Description  INTERVENTIONS:  - Assess and monitor for signs and symptoms of infection  - Monitor lab/diagnostic results  - Monitor all insertion sites, i e  indwelling lines, tubes, and drains  - Administer medications as ordered  - Instruct and encourage patient and family to use good hand hygiene technique  - Identify and instruct in appropriate isolation precautions for identified infection/condition   Outcome: Progressing     Problem: SAFETY ADULT  Goal: Maintain or return to baseline ADL function  Description  INTERVENTIONS:  -  Assess patient's ability to carry out ADLs; assess patient's baseline for ADL function and identify physical deficits which impact ability to perform ADLs (bathing, care of mouth/teeth, toileting, grooming, dressing, etc )  - Assess/evaluate cause of self-care deficits   - Assess range of motion  - Assess patient's mobility; develop plan if impaired  - Assess patient's need for assistive devices and provide as appropriate  - Encourage maximum independence but intervene and supervise when necessary  - Involve family in performance of ADLs  - Assess for home care needs following discharge   - Consider OT consult to assist with ADL evaluation and planning for discharge  - Provide patient education as appropriate  Outcome: Progressing  Goal: Maintain or return mobility status to optimal level  Description  INTERVENTIONS:  - Assess patient's baseline mobility status (ambulation, transfers, stairs, etc )    - Identify cognitive and physical deficits and behaviors that affect mobility  - Identify mobility aids required to assist with transfers and/or ambulation (gait belt, sit-to-stand, lift, walker, cane, etc )  - Dresher fall precautions as indicated by assessment  - Record patient progress and toleration of activity level on Mobility SBAR; progress patient to next Phase/Stage  - Instruct patient to call for assistance with activity based on assessment  - Consider rehabilitation consult to assist with strengthening/weightbearing, etc   Outcome: Progressing     Problem: DISCHARGE PLANNING  Goal: Discharge to home or other facility with appropriate resources  Description  INTERVENTIONS:  - Identify barriers to discharge w/patient and caregiver  - Arrange for needed discharge resources and transportation as appropriate  - Identify discharge learning needs (meds, wound care, etc )  - Arrange for interpretive services to assist at discharge as needed  - Refer to Case Management Department for coordinating discharge planning if the patient needs post-hospital services based on physician/advanced practitioner order or complex needs related to functional status, cognitive ability, or social support system  Outcome: Progressing     Problem: Knowledge Deficit  Goal: Patient/family/caregiver demonstrates understanding of disease process, treatment plan, medications, and discharge instructions  Description  Complete learning assessment and assess knowledge base    Interventions:  - Provide teaching at level of understanding  - Provide teaching via preferred learning methods  Outcome: Progressing     Problem: CARDIOVASCULAR - ADULT  Goal: Maintains optimal cardiac output and hemodynamic stability  Description  INTERVENTIONS:  - Monitor I/O, vital signs and rhythm  - Monitor for S/S and trends of decreased cardiac output  - Administer and titrate ordered vasoactive medications to optimize hemodynamic stability  - Assess quality of pulses, skin color and temperature  - Assess for signs of decreased coronary artery perfusion  - Instruct patient to report change in severity of symptoms  Outcome: Progressing  Goal: Absence of cardiac dysrhythmias or at baseline rhythm  Description  INTERVENTIONS:  - Continuous cardiac monitoring, vital signs, obtain 12 lead EKG if ordered  - Administer antiarrhythmic and heart rate control medications as ordered  - Monitor electrolytes and administer replacement therapy as ordered  Outcome: Progressing     Problem: RESPIRATORY - ADULT  Goal: Achieves optimal ventilation and oxygenation  Description  INTERVENTIONS:  - Assess for changes in respiratory status  - Assess for changes in mentation and behavior  - Position to facilitate oxygenation and minimize respiratory effort  - Oxygen administered by appropriate delivery if ordered  - Initiate smoking cessation education as indicated  - Encourage broncho-pulmonary hygiene including cough, deep breathe, Incentive Spirometry  - Assess the need for suctioning and aspirate as needed  - Assess and instruct to report SOB or any respiratory difficulty  - Respiratory Therapy support as indicated  Outcome: Progressing

## 2020-01-11 NOTE — PROGRESS NOTES
Progress Note - Cardiology   Shaina Toribio 75 y o  male MRN: [id number]  Unit/Bed#: E4 -01 Encounter: [id number]        Problem List:  Principal Problem:    Acute respiratory failure with hypoxia ([address] )  Active Problems:    Eczema    Complete heart block (HCC)    PAF (paroxysmal atrial fibrillation) (HCC)    NICOLASA (obstructive sleep apnea)    Type 2 diabetes mellitus with chronic kidney disease, without long-term current use of insulin (HCC)    Essential hypertension    Acute renal failure superimposed on stage 3 chronic kidney disease (HCC)    Leukocytosis    Elevated troponin I level    Bacteremia due to Gram-positive bacteria    Type 2 diabetes mellitus with diabetic neuropathy, without long-term current use of insulin (HCC)    Pyogenic inflammation of bone (HCC)    Chronic diastolic (congestive) heart failure (HCC)      Asessment:  1  Acute hypoxic respiratory failure:              -primary reason for admission              -suspected PNA +/-mild CHF  2  Chronic diastolic congestive heart failure:              -baseline weight:  215 lb (as OP 12/3/19)              -OP diuretic:  Lasix 40 b i d   3  Moderate aortic and mitral stenosis on echo 05/19  4  Complete heart block, s/p Medtronic dual-chamber PPM  5  Hypertension   6  Dyslipidemia  7  Acute on chronic kidney injury:              -creatinine 2 08 on admission              -as low as 1 1 July of 2019  8  Elevated troponin, 0 22 on admission  9  Type 2 diabetes mellitus  10  Paroxysmal atrial fibrillation              -stroke prophylaxis:  Eliquis              -rate control:  Lopressor 50 b i d   11  Hypoalbuminemia, 2 6  12  Acute osteomyelitis of left foot      Plan/ Discussion:  1  He is now going back to the OR with podiatry tomorrow for I&D with further debridement and possible staged closure  Have discussed with podiatry team, it is okay to hold Eliquis until his surgical procedures are done      2  He appears well compensated from a heart failure standpoint  He was off oxygen earlier but put it back on due to anxiety from having to go back to the OR  Transition back to oral Lasix tomorrow  Was taking 40 BID at home     3  Ongoing workup for bacteremia and left foot osteomyelitis per Infectious Disease and podiatry teams    Discussed with patient and wife at bedside    Subjective:  Has some anxiety about going back to the OR  Has no pain  No shortness of breath      Vitals:  Vitals:    01/10/20 0555 01/11/20 0600   Weight: 97 7 kg (215 lb 6 2 oz) 98 1 kg (216 lb 4 3 oz)   ,  Vitals:    01/11/20 0600 01/11/20 0736 01/11/20 1300 01/11/20 1503   BP:  130/61  139/65   BP Location:  Left arm  Right arm   Pulse:  60  60   Resp:  18  16   Temp:  98 2 °F (36 8 °C)  98 5 °F (36 9 °C)   TempSrc:  Temporal  Temporal   SpO2:  96% 98% 95%   Weight: 98 1 kg (216 lb 4 3 oz)          Exam:  General:  Alert awake and oriented, no acute distress  Heart:  Regular rate and rhythm, no murmurs    Respiratory effort:  Breathing comfortably on supplemental oxygen   Lungs:  Fairly clear bilaterally  Lower Limbs:  No edema, left lower extremity dressed and wrapped             Medications:    Current Facility-Administered Medications:     acetaminophen (TYLENOL) tablet 650 mg, 650 mg, Oral, Q6H PRN, [place] Nickel, DPM, 650 mg at 01/08/20 2308    allopurinol (ZYLOPRIM) tablet 300 mg, 300 mg, Oral, QAM, Adriano Nickel, DPM, 300 mg at 01/11/20 0810    amLODIPine (NORVASC) tablet 10 mg, 10 mg, Oral, Daily, Adriano Nickel, DPM, 10 mg at 01/11/20 0810    ceFAZolin (ANCEF) IVPB (premix) 2,000 mg, 2,000 mg, Intravenous, Q8H, PAT Rich, Stopped at 01/11/20 1047    furosemide (LASIX) injection 40 mg, 40 mg, Intravenous, Daily, Rella Saint, MD, 40 mg at 01/11/20 0810    guaiFENesin (MUCINEX) 12 hr tablet 600 mg, 600 mg, Oral, BID, Spring[place] Nickel, DPM, 600 mg at 01/11/20 0810    insulin glargine (LANTUS) subcutaneous injection 18 Units 0 18 mL, 18 Units, Subcutaneous, HS, Carroll Bauman MD    insulin lispro (HumaLOG) 100 units/mL subcutaneous injection 2-12 Units, 2-12 Units, Subcutaneous, TID AC, 6 Units at 01/11/20 1149 **AND** Fingerstick Glucose (POCT), , , TID AC, Carroll Bauman MD    ipratropium-albuterol (DUO-NEB) 0 5-2 5 mg/3 mL inhalation solution 3 mL, 3 mL, Nebulization, Q6H PRN, [facility] D[facility], 3 mL at 01/07/20 2025    [START ON 1/12/2020] lactated ringers infusion, 75 mL/hr, Intravenous, Continuous, Anatoliy Carverg, DPM    melatonin tablet 6 mg, 6 mg, Oral, HS, [facility], DPM, 6 mg at 01/10/20 2132    metoprolol tartrate (LOPRESSOR) tablet 50 mg, 50 mg, Oral, Q12H [facility], [facility] Eulogio, DPM, 50 mg at 01/11/20 0810    oxyCODONE (ROXICODONE) IR tablet 5 mg, 5 mg, Oral, Q4H PRN, Anatoliy Krishnan, DPM    pravastatin (PRAVACHOL) tablet 40 mg, 40 mg, Oral, Daily With Aarti Judge DPFELTON, 40 mg at 01/10/20 1747    tamsulosin (FLOMAX) capsule 0 4 mg, 0 4 mg, Oral, Daily With Aarti Judge, DPM, 0 4 mg at 01/10/20 1747      Labs/Data:        Results from last 7 days   Lab Units 01/10/20  0502 01/09/20  0410 01/08/20  0602   WBC Thousand/uL 10 27* 14 32* 17 09*   HEMOGLOBIN g/dL 8 1* 9 0* 8 4*   HEMATOCRIT % 24 9* 27 8* 25 3*   PLATELETS Thousands/uL 248 286 232     Results from last 7 days   Lab Units 01/10/20  0502 01/09/20  0410 01/08/20  0602 01/07/20  2135 01/07/20  1652   POTASSIUM mmol/L 4 1 4 3 4 4  --   --    CHLORIDE mmol/L 98* 98* 97*  --   --    CO2 mmol/L 21 20* 22  --   --    BUN mg/dL 83* 70* 61*  --   --    TROPONIN I ng/mL  --   --  0 65* 0 56* 0 43*

## 2020-01-11 NOTE — PROGRESS NOTES
Progress Note - Jaquelin Akhtar 75 y o  male MRN: [id number]    Unit/Bed#: E4 -01 Encounter: [id number]      Subjective: The patient feels pretty well  He is not having much pain  He denies chest pain or shortness of breath  He has no nausea or vomiting  He slept well  He is eating well  His bowels are moving  Physical Exam:   Temp:  [97 8 °F (36 6 °C)-98 8 °F (37 1 °C)] 98 2 °F (36 8 °C)  HR:  [59-62] 60  Resp:  [16-21] 18  BP: (130-147)/(60-70) 130/61    Gen:  Well-developed, well-nourished, in no distress  Neck:  Supple  No lymphadenopathy, goiter, or bruit  Heart:  Regular rhythm  No murmur, gallop, or rub  Lungs:  Clear to auscultation and percussion  No wheezing, rales, or rhonchi    Abd:  Soft with active bowel sounds  No mass, tenderness, or organomegaly  Extremities:  No clubbing, cyanosis, or edema  No calf tenderness  Neuro:  Alert and oriented  No focal sign  Skin:  Warm and dry      LABS:  No new labs          Assessment/Plan:  1  Staph aureus bacteremia with severe sepsis  2  Left 1st toe osteomyelitis, status post amputation  3  Acute respiratory failure with hypoxia  4  Acute diastolic congestive heart failure, improved  5  Acute renal failure, secondary to numbers 1 and 4, slowly improving  6  Type 2 diabetes, not optimally controlled  7  Obstructive sleep apnea  8  Paroxysmal atrial fibrillation  9  Hyponatremia  10  History of complete heart block, status post pacemaker     The patient is on cefazolin for staph sepsis  His insulin has been increased because of ongoing hyperglycemia  The patient's creatinine will be repeated tomorrow  His sodium will also be recheck  His heart failure appears to be adequately compensated  We will try to discontinue oxygen      VTE Pharmacologic Prophylaxis:  Eliquis  VTE Mechanical Prophylaxis: reason for no mechanical VTE prophylaxis Therapeutically anticoagulated

## 2020-01-12 ENCOUNTER — APPOINTMENT (INPATIENT)
Dept: RADIOLOGY | Facility: HOSPITAL | Age: 65
DRG: 853 | End: 2020-01-12
Payer: MEDICARE

## 2020-01-12 ENCOUNTER — ANESTHESIA (INPATIENT)
Dept: PERIOP | Facility: HOSPITAL | Age: 65
DRG: 853 | End: 2020-01-12
Payer: MEDICARE

## 2020-01-12 LAB
ANION GAP SERPL CALCULATED.3IONS-SCNC: 12 MMOL/L (ref 4–13)
BUN SERPL-MCNC: 81 MG/DL (ref 5–25)
CALCIUM SERPL-MCNC: 8.2 MG/DL (ref 8.3–10.1)
CHLORIDE SERPL-SCNC: 97 MMOL/L (ref 100–108)
CO2 SERPL-SCNC: 22 MMOL/L (ref 21–32)
CREAT SERPL-MCNC: 1.9 MG/DL (ref 0.6–1.3)
ERYTHROCYTE [DISTWIDTH] IN BLOOD BY AUTOMATED COUNT: 13.9 % (ref 11.6–15.1)
GFR SERPL CREATININE-BSD FRML MDRD: 36 ML/MIN/1.73SQ M
GLUCOSE SERPL-MCNC: 179 MG/DL (ref 65–140)
GLUCOSE SERPL-MCNC: 199 MG/DL (ref 65–140)
GLUCOSE SERPL-MCNC: 203 MG/DL (ref 65–140)
GLUCOSE SERPL-MCNC: 211 MG/DL (ref 65–140)
GLUCOSE SERPL-MCNC: 228 MG/DL (ref 65–140)
GLUCOSE SERPL-MCNC: 237 MG/DL (ref 65–140)
GLUCOSE SERPL-MCNC: 319 MG/DL (ref 65–140)
HCT VFR BLD AUTO: 24.7 % (ref 36.5–49.3)
HCV AB SER QL: NORMAL
HGB BLD-MCNC: 8.2 G/DL (ref 12–17)
MCH RBC QN AUTO: 30.3 PG (ref 26.8–34.3)
MCHC RBC AUTO-ENTMCNC: 33.2 G/DL (ref 31.4–37.4)
MCV RBC AUTO: 91 FL (ref 82–98)
PLATELET # BLD AUTO: 348 THOUSANDS/UL (ref 149–390)
PMV BLD AUTO: 9.2 FL (ref 8.9–12.7)
POTASSIUM SERPL-SCNC: 3.6 MMOL/L (ref 3.5–5.3)
RBC # BLD AUTO: 2.71 MILLION/UL (ref 3.88–5.62)
SODIUM SERPL-SCNC: 131 MMOL/L (ref 136–145)
WBC # BLD AUTO: 13.39 THOUSAND/UL (ref 4.31–10.16)

## 2020-01-12 PROCEDURE — 86803 HEPATITIS C AB TEST: CPT | Performed by: INTERNAL MEDICINE

## 2020-01-12 PROCEDURE — 94760 N-INVAS EAR/PLS OXIMETRY 1: CPT

## 2020-01-12 PROCEDURE — 82948 REAGENT STRIP/BLOOD GLUCOSE: CPT

## 2020-01-12 PROCEDURE — 0QTP0ZZ RESECTION OF LEFT METATARSAL, OPEN APPROACH: ICD-10-PCS | Performed by: PODIATRIST

## 2020-01-12 PROCEDURE — 88304 TISSUE EXAM BY PATHOLOGIST: CPT | Performed by: PATHOLOGY

## 2020-01-12 PROCEDURE — 88311 DECALCIFY TISSUE: CPT | Performed by: PATHOLOGY

## 2020-01-12 PROCEDURE — 99232 SBSQ HOSP IP/OBS MODERATE 35: CPT | Performed by: INTERNAL MEDICINE

## 2020-01-12 PROCEDURE — 85027 COMPLETE CBC AUTOMATED: CPT | Performed by: STUDENT IN AN ORGANIZED HEALTH CARE EDUCATION/TRAINING PROGRAM

## 2020-01-12 PROCEDURE — 0JBR0ZZ EXCISION OF LEFT FOOT SUBCUTANEOUS TISSUE AND FASCIA, OPEN APPROACH: ICD-10-PCS | Performed by: PODIATRIST

## 2020-01-12 PROCEDURE — 80048 BASIC METABOLIC PNL TOTAL CA: CPT | Performed by: INTERNAL MEDICINE

## 2020-01-12 PROCEDURE — 73630 X-RAY EXAM OF FOOT: CPT

## 2020-01-12 RX ORDER — SODIUM CHLORIDE 9 MG/ML
INJECTION, SOLUTION INTRAVENOUS AS NEEDED
Status: DISCONTINUED | OUTPATIENT
Start: 2020-01-12 | End: 2020-01-12 | Stop reason: HOSPADM

## 2020-01-12 RX ORDER — SODIUM CHLORIDE 9 MG/ML
125 INJECTION, SOLUTION INTRAVENOUS CONTINUOUS
Status: DISCONTINUED | OUTPATIENT
Start: 2020-01-12 | End: 2020-01-12

## 2020-01-12 RX ORDER — FENTANYL CITRATE/PF 50 MCG/ML
25 SYRINGE (ML) INJECTION
Status: DISCONTINUED | OUTPATIENT
Start: 2020-01-12 | End: 2020-01-12 | Stop reason: HOSPADM

## 2020-01-12 RX ORDER — PROPOFOL 10 MG/ML
INJECTION, EMULSION INTRAVENOUS AS NEEDED
Status: DISCONTINUED | OUTPATIENT
Start: 2020-01-12 | End: 2020-01-12 | Stop reason: SURG

## 2020-01-12 RX ORDER — FENTANYL CITRATE 50 UG/ML
INJECTION, SOLUTION INTRAMUSCULAR; INTRAVENOUS AS NEEDED
Status: DISCONTINUED | OUTPATIENT
Start: 2020-01-12 | End: 2020-01-12 | Stop reason: SURG

## 2020-01-12 RX ORDER — ONDANSETRON 2 MG/ML
INJECTION INTRAMUSCULAR; INTRAVENOUS AS NEEDED
Status: DISCONTINUED | OUTPATIENT
Start: 2020-01-12 | End: 2020-01-12 | Stop reason: SURG

## 2020-01-12 RX ORDER — ONDANSETRON 2 MG/ML
4 INJECTION INTRAMUSCULAR; INTRAVENOUS ONCE AS NEEDED
Status: DISCONTINUED | OUTPATIENT
Start: 2020-01-12 | End: 2020-01-12 | Stop reason: HOSPADM

## 2020-01-12 RX ADMIN — AMLODIPINE BESYLATE 10 MG: 10 TABLET ORAL at 10:16

## 2020-01-12 RX ADMIN — INSULIN LISPRO 8 UNITS: 100 INJECTION, SOLUTION INTRAVENOUS; SUBCUTANEOUS at 17:06

## 2020-01-12 RX ADMIN — CEFAZOLIN SODIUM 2000 MG: 2 SOLUTION INTRAVENOUS at 18:14

## 2020-01-12 RX ADMIN — ALLOPURINOL 300 MG: 100 TABLET ORAL at 10:16

## 2020-01-12 RX ADMIN — FENTANYL CITRATE 25 MCG: 50 INJECTION, SOLUTION INTRAMUSCULAR; INTRAVENOUS at 08:20

## 2020-01-12 RX ADMIN — GUAIFENESIN 600 MG: 600 TABLET ORAL at 10:15

## 2020-01-12 RX ADMIN — PROPOFOL 170 MG: 10 INJECTION, EMULSION INTRAVENOUS at 08:14

## 2020-01-12 RX ADMIN — FENTANYL CITRATE 25 MCG: 50 INJECTION, SOLUTION INTRAMUSCULAR; INTRAVENOUS at 08:30

## 2020-01-12 RX ADMIN — CEFAZOLIN SODIUM 2000 MG: 2 SOLUTION INTRAVENOUS at 10:16

## 2020-01-12 RX ADMIN — INSULIN LISPRO 4 UNITS: 100 INJECTION, SOLUTION INTRAVENOUS; SUBCUTANEOUS at 11:53

## 2020-01-12 RX ADMIN — SODIUM CHLORIDE, SODIUM LACTATE, POTASSIUM CHLORIDE, AND CALCIUM CHLORIDE: .6; .31; .03; .02 INJECTION, SOLUTION INTRAVENOUS at 09:22

## 2020-01-12 RX ADMIN — LIDOCAINE HYDROCHLORIDE 80 MG: 20 INJECTION, SOLUTION INTRAVENOUS at 08:14

## 2020-01-12 RX ADMIN — GUAIFENESIN 600 MG: 600 TABLET ORAL at 22:17

## 2020-01-12 RX ADMIN — FUROSEMIDE 40 MG: 10 INJECTION, SOLUTION INTRAMUSCULAR; INTRAVENOUS at 10:15

## 2020-01-12 RX ADMIN — INSULIN GLARGINE 18 UNITS: 100 INJECTION, SOLUTION SUBCUTANEOUS at 22:16

## 2020-01-12 RX ADMIN — CEFAZOLIN SODIUM 2000 MG: 2 SOLUTION INTRAVENOUS at 02:01

## 2020-01-12 RX ADMIN — TAMSULOSIN HYDROCHLORIDE 0.4 MG: 0.4 CAPSULE ORAL at 17:06

## 2020-01-12 RX ADMIN — OXYCODONE HYDROCHLORIDE 5 MG: 5 TABLET ORAL at 17:09

## 2020-01-12 RX ADMIN — FENTANYL CITRATE 25 MCG: 50 INJECTION, SOLUTION INTRAMUSCULAR; INTRAVENOUS at 08:46

## 2020-01-12 RX ADMIN — PRAVASTATIN SODIUM 40 MG: 40 TABLET ORAL at 17:06

## 2020-01-12 RX ADMIN — FENTANYL CITRATE 25 MCG: 50 INJECTION, SOLUTION INTRAMUSCULAR; INTRAVENOUS at 08:37

## 2020-01-12 RX ADMIN — ONDANSETRON 4 MG: 2 INJECTION INTRAMUSCULAR; INTRAVENOUS at 08:14

## 2020-01-12 RX ADMIN — METOPROLOL TARTRATE 50 MG: 50 TABLET, FILM COATED ORAL at 22:17

## 2020-01-12 RX ADMIN — METOPROLOL TARTRATE 50 MG: 50 TABLET, FILM COATED ORAL at 10:17

## 2020-01-12 RX ADMIN — MELATONIN TAB 3 MG 6 MG: 3 TAB at 22:17

## 2020-01-12 NOTE — PLAN OF CARE
Problem: Potential for Falls  Goal: Patient will remain free of falls  Description  INTERVENTIONS:  - Assess patient frequently for physical needs  -  Identify cognitive and physical deficits and behaviors that affect risk of falls    -  [place] fall precautions as indicated by assessment   - Educate patient/family on patient safety including physical limitations  - Instruct patient to call for assistance with activity based on assessment  - Modify environment to reduce risk of injury  - Consider OT/PT consult to assist with strengthening/mobility  Outcome: Progressing     Problem: PAIN - ADULT  Goal: Verbalizes/displays adequate comfort level or baseline comfort level  Description  Interventions:  - Encourage patient to monitor pain and request assistance  - Assess pain using appropriate pain scale  - Administer analgesics based on type and severity of pain and evaluate response  - Implement non-pharmacological measures as appropriate and evaluate response  - Consider cultural and social influences on pain and pain management  - Notify physician/advanced practitioner if interventions unsuccessful or patient reports new pain  Outcome: Progressing     Problem: INFECTION - ADULT  Goal: Absence or prevention of progression during hospitalization  Description  INTERVENTIONS:  - Assess and monitor for signs and symptoms of infection  - Monitor lab/diagnostic results  - Monitor all insertion sites, i e  indwelling lines, tubes, and drains  - Administer medications as ordered  - Instruct and encourage patient and family to use good hand hygiene technique  - Identify and instruct in appropriate isolation precautions for identified infection/condition   Outcome: Progressing     Problem: SAFETY ADULT  Goal: Maintain or return to baseline ADL function  Description  INTERVENTIONS:  -  Assess patient's ability to carry out ADLs; assess patient's baseline for ADL function and identify physical deficits which impact ability to perform ADLs (bathing, care of mouth/teeth, toileting, grooming, dressing, etc )  - Assess/evaluate cause of self-care deficits   - Assess range of motion  - Assess patient's mobility; develop plan if impaired  - Assess patient's need for assistive devices and provide as appropriate  - Encourage maximum independence but intervene and supervise when necessary  - Involve family in performance of ADLs  - Assess for home care needs following discharge   - Consider OT consult to assist with ADL evaluation and planning for discharge  - Provide patient education as appropriate  Outcome: Progressing  Goal: Maintain or return mobility status to optimal level  Description  INTERVENTIONS:  - Assess patient's baseline mobility status (ambulation, transfers, stairs, etc )    - Identify cognitive and physical deficits and behaviors that affect mobility  - Identify mobility aids required to assist with transfers and/or ambulation (gait belt, sit-to-stand, lift, walker, cane, etc )  - [place] fall precautions as indicated by assessment  - Record patient progress and toleration of activity level on Mobility SBAR; progress patient to next Phase/Stage  - Instruct patient to call for assistance with activity based on assessment  - Consider rehabilitation consult to assist with strengthening/weightbearing, etc   Outcome: Progressing     Problem: DISCHARGE PLANNING  Goal: Discharge to home or other facility with appropriate resources  Description  INTERVENTIONS:  - Identify barriers to discharge w/patient and caregiver  - Arrange for needed discharge resources and transportation as appropriate  - Identify discharge learning needs (meds, wound care, etc )  - Arrange for interpretive services to assist at discharge as needed  - Refer to Case Management Department for coordinating discharge planning if the patient needs post-hospital services based on physician/advanced practitioner order or complex needs related to functional status, cognitive ability, or social support system  Outcome: Progressing     Problem: Knowledge Deficit  Goal: Patient/family/caregiver demonstrates understanding of disease process, treatment plan, medications, and discharge instructions  Description  Complete learning assessment and assess knowledge base    Interventions:  - Provide teaching at level of understanding  - Provide teaching via preferred learning methods  Outcome: Progressing     Problem: CARDIOVASCULAR - ADULT  Goal: Maintains optimal cardiac output and hemodynamic stability  Description  INTERVENTIONS:  - Monitor I/O, vital signs and rhythm  - Monitor for S/S and trends of decreased cardiac output  - Administer and titrate ordered vasoactive medications to optimize hemodynamic stability  - Assess quality of pulses, skin color and temperature  - Assess for signs of decreased coronary artery perfusion  - Instruct patient to report change in severity of symptoms  Outcome: Progressing  Goal: Absence of cardiac dysrhythmias or at baseline rhythm  Description  INTERVENTIONS:  - Continuous cardiac monitoring, vital signs, obtain 12 lead EKG if ordered  - Administer antiarrhythmic and heart rate control medications as ordered  - Monitor electrolytes and administer replacement therapy as ordered  Outcome: Progressing     Problem: RESPIRATORY - ADULT  Goal: Achieves optimal ventilation and oxygenation  Description  INTERVENTIONS:  - Assess for changes in respiratory status  - Assess for changes in mentation and behavior  - Position to facilitate oxygenation and minimize respiratory effort  - Oxygen administered by appropriate delivery if ordered  - Initiate smoking cessation education as indicated  - Encourage broncho-pulmonary hygiene including cough, deep breathe, Incentive Spirometry  - Assess the need for suctioning and aspirate as needed  - Assess and instruct to report SOB or any respiratory difficulty  - Respiratory Therapy support as indicated  Outcome: Progressing     Problem: Prexisting or High Potential for Compromised Skin Integrity  Goal: Skin integrity is maintained or improved  Description  INTERVENTIONS:  - Identify patients at risk for skin breakdown  - Assess and monitor skin integrity  - Assess and monitor nutrition and hydration status  - Monitor labs   - Assess for incontinence   - Turn and reposition patient  - Assist with mobility/ambulation  - Relieve pressure over bony prominences  - Avoid friction and shearing  - Provide appropriate hygiene as needed including keeping skin clean and dry  - Evaluate need for skin moisturizer/barrier cream  - Collaborate with interdisciplinary team   - Patient/family teaching  - Consider wound care consult   Outcome: Progressing

## 2020-01-12 NOTE — PROGRESS NOTES
PRE-Operative Note - Podiatry  Shira Sanders 75 y o  male MRN: [id number]  Unit/Bed#: E4 -01 Encounter: [id number]    Assessment:  1  S/p left hallux amputation (DOS 01/08/2020) due to underlying clinical OM, abscess, and possible infectious tenosynovitis   2  Sepsis - POA  3  Bacteremia likely secondary to #1  4  DM type 2       Plan:  1  Confirmed NPO status  Patient to OR for left foot I&D with removal of nonviable skin, soft tissue and bone with possible application of wound vac vs primary closure  2  H&P reviewed, no changes  3  Alternatives, risks, and complications discussed with patient  Patient to sign consent with attending prior to procedure  4  All questions answered  No guarantees given to outcome of procedure  5  C/w IV ancef   6  Will follow-up with Dr [facility] as an outpatient  Subjective/Objective   Chief Complaint:   Chief Complaint   Patient presents with    Fatigue     Pt with multiple complaints: sore throat, diarrhea, fatigue, fevers, tylenol at 0930  -ill contacts  Subjective: aware of surgical plan  No acute events overnight    Blood pressure 162/72, pulse 60, temperature 97 6 °F (36 4 °C), temperature source Tympanic, resp  rate 16, weight 97 9 kg (215 lb 13 3 oz), SpO2 96 %  ,Body mass index is 31 25 kg/m²  Invasive Devices     Peripheral Intravenous Line            Peripheral IV 01/12/20 Right Forearm less than 1 day                Physical Exam:   General appearance: alert, cooperative and no distress    Extremity: dressing c/d/i to L foot  Gross motor function intact  Lab, Imaging and other studies:   No results displayed because visit has over 200 results          Imaging: I have personally reviewed pertinent films in PACS

## 2020-01-12 NOTE — PLAN OF CARE
Problem: Potential for Falls  Goal: Patient will remain free of falls  Description  INTERVENTIONS:  - Assess patient frequently for physical needs  -  Identify cognitive and physical deficits and behaviors that affect risk of falls    -  [place] fall precautions as indicated by assessment   - Educate patient/family on patient safety including physical limitations  - Instruct patient to call for assistance with activity based on assessment  - Modify environment to reduce risk of injury  - Consider OT/PT consult to assist with strengthening/mobility  Outcome: Progressing     Problem: PAIN - ADULT  Goal: Verbalizes/displays adequate comfort level or baseline comfort level  Description  Interventions:  - Encourage patient to monitor pain and request assistance  - Assess pain using appropriate pain scale  - Administer analgesics based on type and severity of pain and evaluate response  - Implement non-pharmacological measures as appropriate and evaluate response  - Consider cultural and social influences on pain and pain management  - Notify physician/advanced practitioner if interventions unsuccessful or patient reports new pain  Outcome: Progressing     Problem: INFECTION - ADULT  Goal: Absence or prevention of progression during hospitalization  Description  INTERVENTIONS:  - Assess and monitor for signs and symptoms of infection  - Monitor lab/diagnostic results  - Monitor all insertion sites, i e  indwelling lines, tubes, and drains  - Administer medications as ordered  - Instruct and encourage patient and family to use good hand hygiene technique  - Identify and instruct in appropriate isolation precautions for identified infection/condition   Outcome: Progressing     Problem: SAFETY ADULT  Goal: Maintain or return to baseline ADL function  Description  INTERVENTIONS:  -  Assess patient's ability to carry out ADLs; assess patient's baseline for ADL function and identify physical deficits which impact ability to perform ADLs (bathing, care of mouth/teeth, toileting, grooming, dressing, etc )  - Assess/evaluate cause of self-care deficits   - Assess range of motion  - Assess patient's mobility; develop plan if impaired  - Assess patient's need for assistive devices and provide as appropriate  - Encourage maximum independence but intervene and supervise when necessary  - Involve family in performance of ADLs  - Assess for home care needs following discharge   - Consider OT consult to assist with ADL evaluation and planning for discharge  - Provide patient education as appropriate  Outcome: Progressing  Goal: Maintain or return mobility status to optimal level  Description  INTERVENTIONS:  - Assess patient's baseline mobility status (ambulation, transfers, stairs, etc )    - Identify cognitive and physical deficits and behaviors that affect mobility  - Identify mobility aids required to assist with transfers and/or ambulation (gait belt, sit-to-stand, lift, walker, cane, etc )  - [place] fall precautions as indicated by assessment  - Record patient progress and toleration of activity level on Mobility SBAR; progress patient to next Phase/Stage  - Instruct patient to call for assistance with activity based on assessment  - Consider rehabilitation consult to assist with strengthening/weightbearing, etc   Outcome: Progressing     Problem: DISCHARGE PLANNING  Goal: Discharge to home or other facility with appropriate resources  Description  INTERVENTIONS:  - Identify barriers to discharge w/patient and caregiver  - Arrange for needed discharge resources and transportation as appropriate  - Identify discharge learning needs (meds, wound care, etc )  - Arrange for interpretive services to assist at discharge as needed  - Refer to Case Management Department for coordinating discharge planning if the patient needs post-hospital services based on physician/advanced practitioner order or complex needs related to functional status, cognitive ability, or social support system  Outcome: Progressing     Problem: Knowledge Deficit  Goal: Patient/family/caregiver demonstrates understanding of disease process, treatment plan, medications, and discharge instructions  Description  Complete learning assessment and assess knowledge base    Interventions:  - Provide teaching at level of understanding  - Provide teaching via preferred learning methods  Outcome: Progressing     Problem: CARDIOVASCULAR - ADULT  Goal: Maintains optimal cardiac output and hemodynamic stability  Description  INTERVENTIONS:  - Monitor I/O, vital signs and rhythm  - Monitor for S/S and trends of decreased cardiac output  - Administer and titrate ordered vasoactive medications to optimize hemodynamic stability  - Assess quality of pulses, skin color and temperature  - Assess for signs of decreased coronary artery perfusion  - Instruct patient to report change in severity of symptoms  Outcome: Progressing  Goal: Absence of cardiac dysrhythmias or at baseline rhythm  Description  INTERVENTIONS:  - Continuous cardiac monitoring, vital signs, obtain 12 lead EKG if ordered  - Administer antiarrhythmic and heart rate control medications as ordered  - Monitor electrolytes and administer replacement therapy as ordered  Outcome: Progressing     Problem: RESPIRATORY - ADULT  Goal: Achieves optimal ventilation and oxygenation  Description  INTERVENTIONS:  - Assess for changes in respiratory status  - Assess for changes in mentation and behavior  - Position to facilitate oxygenation and minimize respiratory effort  - Oxygen administered by appropriate delivery if ordered  - Initiate smoking cessation education as indicated  - Encourage broncho-pulmonary hygiene including cough, deep breathe, Incentive Spirometry  - Assess the need for suctioning and aspirate as needed  - Assess and instruct to report SOB or any respiratory difficulty  - Respiratory Therapy support as indicated  Outcome: Progressing     Problem: Prexisting or High Potential for Compromised Skin Integrity  Goal: Skin integrity is maintained or improved  Description  INTERVENTIONS:  - Identify patients at risk for skin breakdown  - Assess and monitor skin integrity  - Assess and monitor nutrition and hydration status  - Monitor labs   - Assess for incontinence   - Turn and reposition patient  - Assist with mobility/ambulation  - Relieve pressure over bony prominences  - Avoid friction and shearing  - Provide appropriate hygiene as needed including keeping skin clean and dry  - Evaluate need for skin moisturizer/barrier cream  - Collaborate with interdisciplinary team   - Patient/family teaching  - Consider wound care consult   Outcome: Progressing

## 2020-01-12 NOTE — OP NOTE
OPERATIVE REPORT  PATIENT NAME: Jennifer Reardon    :  1955  MRN: [id number]  Pt Location: [facility] OR ROOM 02    SURGERY DATE: 2020    Surgeon(s) and Role:     * Denise Rolle DPM - Primary     * Marquise Hamilton DPM - Assisting    Preop Diagnosis:  Other acute osteomyelitis of left foot ([facility] [address] ) [M86 172]    Post-Op Diagnosis Codes:     * Other acute osteomyelitis of left foot ([address] ) [M86 172]    Procedure(s) (LRB):  INCISION AND DRAINAGE (I&D) EXTREMITY AND REMOVAL OF SESMOID BONE (Left)    Specimen(s):  ID Type Source Tests Collected by Time Destination   1 : Sesmoid Bone Tissue Foot, Left TISSUE EXAM Denise Rolle DPM 2020 [id number]        Estimated Blood Loss:   50 mL    Drains:  * No LDAs found *    Anesthesia Type:   Choice    Operative Indications: Other acute osteomyelitis of left foot ([facility] [address] ) [M86 172]      Operative Findings:  C/w diagnosis  Necrotic tissue noted surrounding medial sesamoid, which was removed  No purulence was milked from foot  Mild bleeding to wound edges noted, will need to monitor    Complications:   None    Procedure and Technique:  Under mild sedation, the patient was brought into the operating room and placed on the operating room table in the supine position  A pneumatic ankle tourniquet was then placed around the patient's left ankle with ample webril padding but never inflated  A time out was performed to confirm the correct patient, procedure and site with all parties in agreement  Following IV sedation, local anesthetic was obtained about the patient's foot was performed consisting of 10 ml of 1% Lidocaine and 0 5% Bupivacaine in a 1:1 mixture  The foot was then scrubbed, prepped and draped in the usual aseptic manner  Attention was directed to the left foot where all sutures from incision site were removed  Brown discolored tissue was noted from previous surgicel powder which was debrided with curette   There was necrotic tissue noted to submet 1 area surrounding medial sesamoid, and intraoperative decision was made to remove the sesamoid given appearance  The incision was extended proximally as well where some necrotic tissue was visualized  Wound was excisionally debrided with 15 blade, ronguer and curette to remove appreciable nonviable, devitalized, necrotic tissue and skin to depth of subcutaneous tissue < 20cm sq  Sesamoid bone was sent to pathology for analysis  The wound was then irrigated with copious amounts of normal sterile saline with 3L via pulse lavage  Wound was then inspected and metatarsal bone appeared hard, white and viable  Wound edges noted with mild bleeding only, and will need to be monitored for healing potential  Primary closure was obtained with 3-0 prolene with acceptable skin tension  The incision site was then dressed with Xeroform, Dry sterile dressing  The pneumatic ankle tourniquet was deflated and normal hyperemic flush was noted to all digits  The patient tolerated the procedure and anesthesia well and was transported to the PACU with vital signs stable  As with many limb salvage procedures, we contemplate the possibility of performing further stages to this procedure  Procedures may include debridements, delayed closure, plastic surgery techniques, or more proximal amputations  This procedure may be considered part of a multi-staged limb salvage treatment plan          Dr Sun Collins was present for the entire procedure and participated in all key aspects    Patient Disposition:  PACU     SIGNATURE: Erika Garay DPM  DATE: January 12, 2020  TIME: 9:27 AM

## 2020-01-12 NOTE — ANESTHESIA PREPROCEDURE EVALUATION
Review of Systems/Medical History  Patient summary reviewed        Cardiovascular  Negative cardio ROS Hyperlipidemia, Hypertension , Dysrhythmias , CHF compensated CHF,    Pulmonary  Negative pulmonary ROS Sleep apnea ,        GI/Hepatic  Negative GI/hepatic ROS          Negative  ROS Chronic kidney disease stage 3,        Endo/Other  Negative endo/other ROS Diabetes poorly controlled type 2 Insulin,      GYN  Negative gynecology ROS          Hematology  Negative hematology ROS Anemia ,     Musculoskeletal  Negative musculoskeletal ROS   Arthritis     Neurology  Negative neurology ROS      Psychology   Negative psychology ROS              Physical Exam    Airway    Mallampati score: II  TM Distance: >3 FB  Neck ROM: full     Dental       Cardiovascular  Comment: Negative ROS, Rhythm: regular, Rate: normal, Cardiovascular exam normal    Pulmonary  Pulmonary exam normal Breath sounds clear to auscultation,     Other Findings        Anesthesia Plan  ASA Score- 3     Anesthesia Type- general with ASA Monitors  Additional Monitors:   Airway Plan:     Comment: TIVA  Or  LMA   Plan Factors-    Induction- intravenous  Postoperative Plan-     Informed Consent- Anesthetic plan and risks discussed with patient

## 2020-01-12 NOTE — PROGRESS NOTES
Progress Note - Carl Jeter 75 y o  male MRN: [id number]    Unit/Bed#: E4 -01 Encounter: [id number]      Subjective: The patient feels pretty well  He underwent additional surgery on his left foot this morning  He tolerated the procedure well  He denies chest pain, shortness of breath, abdominal pain, nausea, or vomiting  Physical Exam:   Temp:  [97 6 °F (36 4 °C)-98 8 °F (37 1 °C)] 98 1 °F (36 7 °C)  HR:  [59-64] 60  Resp:  [16-19] 18  BP: (133-162)/(57-73) 133/66    Gen:  Well-developed, in no distress  Neck:  Supple  No lymphadenopathy, goiter, or bruit  Heart:  Regular rhythm  No murmur, gallop, or rub  Lungs:  Clear to auscultation and percussion  No wheezing, rales, or rhonchi    Abd:  Soft with active bowel sounds  No mass, tenderness, or organomegaly  Extremities:  No clubbing, cyanosis, or edema  Left foot is wrapped  Neuro:  Alert and oriented  No focal sign  Skin:  Warm and dry      LABS:   CBC:   Lab Results   Component Value Date    WBC 13 39 (H) 01/12/2020    HGB 8 2 (L) 01/12/2020    HCT 24 7 (L) 01/12/2020    MCV 91 01/12/2020     01/12/2020    MCH 30 3 01/12/2020    MCHC 33 2 01/12/2020    RDW 13 9 01/12/2020    MPV 9 2 01/12/2020   , CMP:   Lab Results   Component Value Date    SODIUM 131 (L) 01/12/2020    K 3 6 01/12/2020    CL 97 (L) 01/12/2020    CO2 22 01/12/2020    BUN 81 (H) 01/12/2020    CREATININE 1 90 (H) 01/12/2020    CALCIUM 8 2 (L) 01/12/2020    EGFR 36 01/12/2020             Assessment/Plan:  1  Staph aureus bacteremia with severe sepsis  2  First toe osteomyelitis, status post amputation and revision  3  Acute respiratory failure with hypoxia  4  Acute diastolic congestive heart failure  5  Acute renal failure secondary to numbers 1 and 4, improving  6  Type 2 diabetes  7  Obstructive sleep apnea  8  Paroxysmal atrial fibrillation  9  Hyponatremia  10  History of complete heart block, status post pacemaker    Overall, the patient is doing better    His sepsis is controlled  He improved rather dramatically after his toe was amputated  He appears more or less euvolemic  He is still on oxygen though his oxygen requirements have diminished  His creatinine is steadily improving  His diabetic control has improved  He remains on Eliquis for stroke prophylaxis        VTE Pharmacologic Prophylaxis:  Eliquis  VTE Mechanical Prophylaxis: reason for no mechanical VTE prophylaxis Therapeutically anticoagulated

## 2020-01-12 NOTE — PROGRESS NOTES
Patient returned from OR post I&D  patient resting in bed, wife at bedside  No complaints of pain  Morning medications given per order  Patient has call bell in hand

## 2020-01-13 ENCOUNTER — APPOINTMENT (INPATIENT)
Dept: RADIOLOGY | Facility: HOSPITAL | Age: 65
DRG: 853 | End: 2020-01-13
Payer: MEDICARE

## 2020-01-13 ENCOUNTER — APPOINTMENT (INPATIENT)
Dept: NON INVASIVE DIAGNOSTICS | Facility: HOSPITAL | Age: 65
DRG: 853 | End: 2020-01-13
Payer: MEDICARE

## 2020-01-13 LAB
ANION GAP SERPL CALCULATED.3IONS-SCNC: 11 MMOL/L (ref 4–13)
BASOPHILS # BLD AUTO: 0.07 THOUSANDS/ΜL (ref 0–0.1)
BASOPHILS NFR BLD AUTO: 1 % (ref 0–1)
BUN SERPL-MCNC: 81 MG/DL (ref 5–25)
CALCIUM SERPL-MCNC: 8.1 MG/DL (ref 8.3–10.1)
CHLORIDE SERPL-SCNC: 97 MMOL/L (ref 100–108)
CO2 SERPL-SCNC: 22 MMOL/L (ref 21–32)
CREAT SERPL-MCNC: 2.01 MG/DL (ref 0.6–1.3)
EOSINOPHIL # BLD AUTO: 0.75 THOUSAND/ΜL (ref 0–0.61)
EOSINOPHIL NFR BLD AUTO: 5 % (ref 0–6)
ERYTHROCYTE [DISTWIDTH] IN BLOOD BY AUTOMATED COUNT: 14 % (ref 11.6–15.1)
GFR SERPL CREATININE-BSD FRML MDRD: 34 ML/MIN/1.73SQ M
GLUCOSE SERPL-MCNC: 163 MG/DL (ref 65–140)
GLUCOSE SERPL-MCNC: 198 MG/DL (ref 65–140)
GLUCOSE SERPL-MCNC: 238 MG/DL (ref 65–140)
GLUCOSE SERPL-MCNC: 278 MG/DL (ref 65–140)
GLUCOSE SERPL-MCNC: 295 MG/DL (ref 65–140)
HCT VFR BLD AUTO: 22.7 % (ref 36.5–49.3)
HGB BLD-MCNC: 7.5 G/DL (ref 12–17)
IMM GRANULOCYTES # BLD AUTO: 0.32 THOUSAND/UL (ref 0–0.2)
IMM GRANULOCYTES NFR BLD AUTO: 2 % (ref 0–2)
LYMPHOCYTES # BLD AUTO: 1.55 THOUSANDS/ΜL (ref 0.6–4.47)
LYMPHOCYTES NFR BLD AUTO: 10 % (ref 14–44)
MCH RBC QN AUTO: 30.5 PG (ref 26.8–34.3)
MCHC RBC AUTO-ENTMCNC: 33 G/DL (ref 31.4–37.4)
MCV RBC AUTO: 92 FL (ref 82–98)
MONOCYTES # BLD AUTO: 1.14 THOUSAND/ΜL (ref 0.17–1.22)
MONOCYTES NFR BLD AUTO: 7 % (ref 4–12)
NEUTROPHILS # BLD AUTO: 11.51 THOUSANDS/ΜL (ref 1.85–7.62)
NEUTS SEG NFR BLD AUTO: 75 % (ref 43–75)
NRBC BLD AUTO-RTO: 0 /100 WBCS
PLATELET # BLD AUTO: 372 THOUSANDS/UL (ref 149–390)
PMV BLD AUTO: 9.5 FL (ref 8.9–12.7)
POTASSIUM SERPL-SCNC: 3.8 MMOL/L (ref 3.5–5.3)
RBC # BLD AUTO: 2.46 MILLION/UL (ref 3.88–5.62)
SODIUM SERPL-SCNC: 130 MMOL/L (ref 136–145)
WBC # BLD AUTO: 15.34 THOUSAND/UL (ref 4.31–10.16)

## 2020-01-13 PROCEDURE — 36569 INSJ PICC 5 YR+ W/O IMAGING: CPT

## 2020-01-13 PROCEDURE — 99233 SBSQ HOSP IP/OBS HIGH 50: CPT | Performed by: INTERNAL MEDICINE

## 2020-01-13 PROCEDURE — C1751 CATH, INF, PER/CENT/MIDLINE: HCPCS

## 2020-01-13 PROCEDURE — 97110 THERAPEUTIC EXERCISES: CPT

## 2020-01-13 PROCEDURE — 85025 COMPLETE CBC W/AUTO DIFF WBC: CPT | Performed by: STUDENT IN AN ORGANIZED HEALTH CARE EDUCATION/TRAINING PROGRAM

## 2020-01-13 PROCEDURE — 93923 UPR/LXTR ART STDY 3+ LVLS: CPT

## 2020-01-13 PROCEDURE — 93925 LOWER EXTREMITY STUDY: CPT

## 2020-01-13 PROCEDURE — 05HM33Z INSERTION OF INFUSION DEVICE INTO RIGHT INTERNAL JUGULAR VEIN, PERCUTANEOUS APPROACH: ICD-10-PCS | Performed by: INTERNAL MEDICINE

## 2020-01-13 PROCEDURE — 80048 BASIC METABOLIC PNL TOTAL CA: CPT | Performed by: STUDENT IN AN ORGANIZED HEALTH CARE EDUCATION/TRAINING PROGRAM

## 2020-01-13 PROCEDURE — 99232 SBSQ HOSP IP/OBS MODERATE 35: CPT | Performed by: INTERNAL MEDICINE

## 2020-01-13 PROCEDURE — 71045 X-RAY EXAM CHEST 1 VIEW: CPT

## 2020-01-13 PROCEDURE — 82948 REAGENT STRIP/BLOOD GLUCOSE: CPT

## 2020-01-13 PROCEDURE — 94660 CPAP INITIATION&MGMT: CPT

## 2020-01-13 PROCEDURE — 03WYX3Z REVISION OF INFUSION DEVICE IN UPPER ARTERY, EXTERNAL APPROACH: ICD-10-PCS | Performed by: RADIOLOGY

## 2020-01-13 RX ORDER — FUROSEMIDE 10 MG/ML
40 INJECTION INTRAMUSCULAR; INTRAVENOUS EVERY 8 HOURS
Status: DISCONTINUED | OUTPATIENT
Start: 2020-01-13 | End: 2020-01-14

## 2020-01-13 RX ORDER — FUROSEMIDE 10 MG/ML
20 INJECTION INTRAMUSCULAR; INTRAVENOUS ONCE
Status: DISCONTINUED | OUTPATIENT
Start: 2020-01-13 | End: 2020-01-13

## 2020-01-13 RX ORDER — INSULIN GLARGINE 100 [IU]/ML
20 INJECTION, SOLUTION SUBCUTANEOUS
Status: DISCONTINUED | OUTPATIENT
Start: 2020-01-13 | End: 2020-01-14

## 2020-01-13 RX ADMIN — INSULIN LISPRO 2 UNITS: 100 INJECTION, SOLUTION INTRAVENOUS; SUBCUTANEOUS at 08:33

## 2020-01-13 RX ADMIN — GUAIFENESIN 600 MG: 600 TABLET ORAL at 21:30

## 2020-01-13 RX ADMIN — CEFAZOLIN SODIUM 2000 MG: 2 SOLUTION INTRAVENOUS at 10:41

## 2020-01-13 RX ADMIN — INSULIN GLARGINE 20 UNITS: 100 INJECTION, SOLUTION SUBCUTANEOUS at 21:30

## 2020-01-13 RX ADMIN — FUROSEMIDE 40 MG: 10 INJECTION, SOLUTION INTRAMUSCULAR; INTRAVENOUS at 08:31

## 2020-01-13 RX ADMIN — CEFAZOLIN SODIUM 2000 MG: 2 SOLUTION INTRAVENOUS at 18:06

## 2020-01-13 RX ADMIN — METOPROLOL TARTRATE 50 MG: 50 TABLET, FILM COATED ORAL at 08:31

## 2020-01-13 RX ADMIN — INSULIN LISPRO 6 UNITS: 100 INJECTION, SOLUTION INTRAVENOUS; SUBCUTANEOUS at 16:49

## 2020-01-13 RX ADMIN — AMLODIPINE BESYLATE 10 MG: 10 TABLET ORAL at 08:30

## 2020-01-13 RX ADMIN — FUROSEMIDE 40 MG: 10 INJECTION, SOLUTION INTRAMUSCULAR; INTRAVENOUS at 16:40

## 2020-01-13 RX ADMIN — ALLOPURINOL 300 MG: 100 TABLET ORAL at 08:30

## 2020-01-13 RX ADMIN — PRAVASTATIN SODIUM 40 MG: 40 TABLET ORAL at 16:39

## 2020-01-13 RX ADMIN — INSULIN LISPRO 4 UNITS: 100 INJECTION, SOLUTION INTRAVENOUS; SUBCUTANEOUS at 11:41

## 2020-01-13 RX ADMIN — METOPROLOL TARTRATE 50 MG: 50 TABLET, FILM COATED ORAL at 21:30

## 2020-01-13 RX ADMIN — GUAIFENESIN 600 MG: 600 TABLET ORAL at 08:30

## 2020-01-13 RX ADMIN — MELATONIN TAB 3 MG 6 MG: 3 TAB at 21:30

## 2020-01-13 RX ADMIN — CEFAZOLIN SODIUM 2000 MG: 2 SOLUTION INTRAVENOUS at 03:12

## 2020-01-13 RX ADMIN — TAMSULOSIN HYDROCHLORIDE 0.4 MG: 0.4 CAPSULE ORAL at 16:39

## 2020-01-13 NOTE — PROGRESS NOTES
Progress Note - Infectious Disease   Job Le 75 y o  male MRN: [id number]  Unit/Bed#: E4 -01 Encounter: [id number]    Impression/Plan:  1  Sepsis   POA   Fever and leukocytosis  [place] secondary to MSSA bacteremia with left hallux wound and underlying osteopmyelitis as presumed source  No other clear source appreciated  Despite being systemically ill he has been hemodynamically stable  Today he is afebrile and his WBC count continues trending down  Repeat blood cultures are negative >72 hours  TTE was negative but he should complete ELBERT for guidance on treatment duration   -antibiotic as below  -monitor CBC and BMP  -follow up repeat blood cultures  -check ELBERT  -monitor vitals  -supportive care     2  Meganrobert Kamara in both sets of patient's initial blood cultures  Suspect patient's left great toe ulcer was source   Patient does have an implanted pacemaker  TTE was negative but he should pursue ELBERT for complete evaluation and guidance on duration of antibiotic treatment  Repeat blood cultures are cleared >72 hours  Patient is currently receiving IV cefazolin and is tolerating without difficulty  He is okay for PICC placement for long-term antibiotics  -continue IV cefazolin, 2g q8 hours  -monitor CBC and BMP  -follow up repeat blood cultures  -okay to place PICC  -check ELBERT  -monitor vitals     3  Left hallux ulceration   With osteomyelitis as his wound does probe to bone and an x-ray of the left foot was suggestive of bony erosion at the 1st proximal phalanx  Purulence noted during initial podiatry wound exam, concern for possible infectious tenosynovitis   I suspect this was the source of patient's bacteremia above  Jam Rose is now status post amputation of hallux for presumed surgical cure of the ulcer and osteomyelitis  Intraop wound cultures showed MSSA   He had additional I&D and removal and sesamoid bone on 1/12/2020    -serial left foot exams  -follow up intraop wound cultures  -local wound care per Podiatry  -continue close follow-up with Podiatry     4  Acute hypoxic respiratory failure   Suspect this is secondary to pulmonary edema and diastolic heart failure   BNP and troponin were elevated upon admission  Chest x-ray and CT of the chest did not show consolidations indicative of a bacterial process  Patient did initially require high-flow O2  He was weaned down to nasal cannula and is now stable on room air  He is following closely with Cardiology and is receiving diuresis     -diuresis per Cardiology  -monitor vitals  -monitor respiratory status  -continue close follow-up with Cardiology     5  Type 2 diabetes mellitus with neuropathy   Patient's last hemoglobin A1c was 6 4% on 01/07/2020   This is risk factor for wounds and infection   Recommend tight glycemic control for overall health, especially in the setting of wound infection   -blood glucose management per primary service     6  Chronic diastolic heart failure   In setting of aortic and mitral valve stenosis and complete heart block   He has a [employer]tr[employer] dual chamber pacemaker in place   Now with Staph aureus bacteremia  TTE was negative but he should pursue ELBERT for further assessment for endocarditis/pacer infection  He is following closely with Cardiology   -check ELBERT  -continue follow-up with Cardiology     7  Paroxysmal AFib   On Eliquis  Above plan was discussed in detail with patient at the bedside  He is willing to pursue a ELBERT  Above plan was discussed in detail with [facility] attending, Dr Bia Persaud  Antibiotics:  Cefazolin 4  Antibiotics 7    Subjective:  Patient reports he is feeling well today  States he is willing to pursue the ELBERT as he wants to make sure his pacemaker is safe  He reports no pain in his left foot today and tells me that his surgical dressing has remained dry intact    He denies fever, chills, sweats, shakes; no nausea, vomiting, abdominal pain, diarrhea, or dysuria; no cough, shortness of breath, or chest pain  No new symptoms  Objective:  Vitals:  Temp:  [97 9 °F (36 6 °C)-98 9 °F (37 2 °C)] 97 9 °F (36 6 °C)  HR:  [59-64] 61  Resp:  [18-20] 20  BP: (121-160)/(43-73) 141/67  SpO2:  [93 %-97 %] 97 %  Temp (24hrs), Av 4 °F (36 9 °C), Min:97 9 °F (36 6 °C), Max:98 9 °F (37 2 °C)  Current: Temperature: 97 9 °F (36 6 °C)    Physical Exam:   General Appearance:  Alert, interactive, nontoxic, no acute distress  Patient is wearing his glasses  Throat: Oropharynx moist without lesions  Lungs:   Clear to auscultation bilaterally; no wheezes, rhonchi or rales; respirations unlabored; patient is on room air   Heart:  RRR;+murmur, rub or gallop   Abdomen:   Soft, non-tender, non-distended, positive bowel sounds  Extremities: No clubbing cyanosis, or edema of the right lower extremity  Left foot surgical dressing is wrapped in Ace, is clean intact without breakthrough drainage, no spreading erythema on the now left distal lower leg   Skin: No new rashes, lesions, or draining wounds noted on exposed skin  Labs, Imaging, & Other studies:   All pertinent labs and imaging studies were personally reviewed  Results from last 7 days   Lab Units 20  0634 20  1026 01/10/20  0502   WBC Thousand/uL 15 34* 13 39* 10 27*   HEMOGLOBIN g/dL 7 5* 8 2* 8 1*   PLATELETS Thousands/uL 372 348 248     Results from last 7 days   Lab Units 20  0634  20  1207   POTASSIUM mmol/L 3 8   < > 4 3   CHLORIDE mmol/L 97*   < > 96*   CO2 mmol/L 22   < > 21   BUN mg/dL 81*   < > 51*   CREATININE mg/dL 2 01*   < > 2 08*   EGFR ml/min/1 73sq m 34   < > 32   CALCIUM mg/dL 8 1*   < > 8 7   AST U/L  --   --  24   ALT U/L  --   --  24   ALK PHOS U/L  --   --  83    < > = values in this interval not displayed  Results from last 7 days   Lab Units 20  0413 20  0411 20  1808 20  1759 20  0110 20  1329   BLOOD CULTURE  No Growth at 72 hrs   No Growth at 72 hrs   --   --   -- Staphylococcus aureus*  Staphylococcus aureus*   GRAM STAIN RESULT   --   --  Rare Polys*  Rare Gram positive cocci in pairs* 1+ Disintegrating polys*  1+ Gram positive cocci in pairs*  1+ Gram positive cocci in clusters*  --  Gram positive cocci in clusters*  Gram positive cocci in clusters*   LEGIONELLA URINARY ANTIGEN   --   --   --   --  Negative  --      Results from last 7 days   Lab Units 01/08/20  0602 01/07/20  1329   PROCALCITONIN ng/ml 1 88* 0 67*

## 2020-01-13 NOTE — PLAN OF CARE
Problem: Potential for Falls  Goal: Patient will remain free of falls  Description  INTERVENTIONS:  - Assess patient frequently for physical needs  -  Identify cognitive and physical deficits and behaviors that affect risk of falls    -  [place] fall precautions as indicated by assessment   - Educate patient/family on patient safety including physical limitations  - Instruct patient to call for assistance with activity based on assessment  - Modify environment to reduce risk of injury  - Consider OT/PT consult to assist with strengthening/mobility  Outcome: Progressing     Problem: PAIN - ADULT  Goal: Verbalizes/displays adequate comfort level or baseline comfort level  Description  Interventions:  - Encourage patient to monitor pain and request assistance  - Assess pain using appropriate pain scale  - Administer analgesics based on type and severity of pain and evaluate response  - Implement non-pharmacological measures as appropriate and evaluate response  - Consider cultural and social influences on pain and pain management  - Notify physician/advanced practitioner if interventions unsuccessful or patient reports new pain  Outcome: Progressing     Problem: INFECTION - ADULT  Goal: Absence or prevention of progression during hospitalization  Description  INTERVENTIONS:  - Assess and monitor for signs and symptoms of infection  - Monitor lab/diagnostic results  - Monitor all insertion sites, i e  indwelling lines, tubes, and drains  - Administer medications as ordered  - Instruct and encourage patient and family to use good hand hygiene technique  - Identify and instruct in appropriate isolation precautions for identified infection/condition   Outcome: Progressing     Problem: SAFETY ADULT  Goal: Maintain or return to baseline ADL function  Description  INTERVENTIONS:  -  Assess patient's ability to carry out ADLs; assess patient's baseline for ADL function and identify physical deficits which impact ability to perform ADLs (bathing, care of mouth/teeth, toileting, grooming, dressing, etc )  - Assess/evaluate cause of self-care deficits   - Assess range of motion  - Assess patient's mobility; develop plan if impaired  - Assess patient's need for assistive devices and provide as appropriate  - Encourage maximum independence but intervene and supervise when necessary  - Involve family in performance of ADLs  - Assess for home care needs following discharge   - Consider OT consult to assist with ADL evaluation and planning for discharge  - Provide patient education as appropriate  Outcome: Progressing  Goal: Maintain or return mobility status to optimal level  Description  INTERVENTIONS:  - Assess patient's baseline mobility status (ambulation, transfers, stairs, etc )    - Identify cognitive and physical deficits and behaviors that affect mobility  - Identify mobility aids required to assist with transfers and/or ambulation (gait belt, sit-to-stand, lift, walker, cane, etc )  - [place] fall precautions as indicated by assessment  - Record patient progress and toleration of activity level on Mobility SBAR; progress patient to next Phase/Stage  - Instruct patient to call for assistance with activity based on assessment  - Consider rehabilitation consult to assist with strengthening/weightbearing, etc   Outcome: Progressing     Problem: DISCHARGE PLANNING  Goal: Discharge to home or other facility with appropriate resources  Description  INTERVENTIONS:  - Identify barriers to discharge w/patient and caregiver  - Arrange for needed discharge resources and transportation as appropriate  - Identify discharge learning needs (meds, wound care, etc )  - Arrange for interpretive services to assist at discharge as needed  - Refer to Case Management Department for coordinating discharge planning if the patient needs post-hospital services based on physician/advanced practitioner order or complex needs related to functional status, cognitive ability, or social support system  Outcome: Progressing     Problem: Knowledge Deficit  Goal: Patient/family/caregiver demonstrates understanding of disease process, treatment plan, medications, and discharge instructions  Description  Complete learning assessment and assess knowledge base    Interventions:  - Provide teaching at level of understanding  - Provide teaching via preferred learning methods  Outcome: Progressing     Problem: CARDIOVASCULAR - ADULT  Goal: Maintains optimal cardiac output and hemodynamic stability  Description  INTERVENTIONS:  - Monitor I/O, vital signs and rhythm  - Monitor for S/S and trends of decreased cardiac output  - Administer and titrate ordered vasoactive medications to optimize hemodynamic stability  - Assess quality of pulses, skin color and temperature  - Assess for signs of decreased coronary artery perfusion  - Instruct patient to report change in severity of symptoms  Outcome: Progressing  Goal: Absence of cardiac dysrhythmias or at baseline rhythm  Description  INTERVENTIONS:  - Continuous cardiac monitoring, vital signs, obtain 12 lead EKG if ordered  - Administer antiarrhythmic and heart rate control medications as ordered  - Monitor electrolytes and administer replacement therapy as ordered  Outcome: Progressing     Problem: RESPIRATORY - ADULT  Goal: Achieves optimal ventilation and oxygenation  Description  INTERVENTIONS:  - Assess for changes in respiratory status  - Assess for changes in mentation and behavior  - Position to facilitate oxygenation and minimize respiratory effort  - Oxygen administered by appropriate delivery if ordered  - Initiate smoking cessation education as indicated  - Encourage broncho-pulmonary hygiene including cough, deep breathe, Incentive Spirometry  - Assess the need for suctioning and aspirate as needed  - Assess and instruct to report SOB or any respiratory difficulty  - Respiratory Therapy support as indicated  Outcome: Progressing     Problem: Prexisting or High Potential for Compromised Skin Integrity  Goal: Skin integrity is maintained or improved  Description  INTERVENTIONS:  - Identify patients at risk for skin breakdown  - Assess and monitor skin integrity  - Assess and monitor nutrition and hydration status  - Monitor labs   - Assess for incontinence   - Turn and reposition patient  - Assist with mobility/ambulation  - Relieve pressure over bony prominences  - Avoid friction and shearing  - Provide appropriate hygiene as needed including keeping skin clean and dry  - Evaluate need for skin moisturizer/barrier cream  - Collaborate with interdisciplinary team   - Patient/family teaching  - Consider wound care consult   Outcome: Progressing

## 2020-01-13 NOTE — ANESTHESIA PREPROCEDURE EVALUATION
Review of Systems/Medical History  Patient summary reviewed  Chart reviewed  No history of anesthetic complications     Cardiovascular  EKG reviewed, Pacemaker/AICD, Hyperlipidemia, Hypertension , Valvular heart disease , mitral valve stenosis, Dysrhythmias , atrial fibrillation and 3rd degree block, CHF compensated CHF,   Comment: Pacer 2018 for complete heart block   V paced currently     LEFT VENTRICLE:  Systolic function was normal by visual assessment  Ejection fraction was estimated to be 55 %  There were no regional wall motion abnormalities  Wall thickness was mildly increased  There was mild concentric hypertrophy      LEFT ATRIUM:  The atrium was moderately dilated      MITRAL VALVE:  There was marked annular calcification  There was marked calcification of the posterior leaflet, with mild chordal involvement  There was severely restricted mobility of the posterior leaflet  There was moderate stenosis  Mean gradient of 5 6 mmHg at heart rate of 60 bpm   There was mild regurgitation      AORTIC VALVE:  There was mild stenosis  VMax 2 28 m/s, Mean Gradient 11 50 mmHg, FITO 2 1 cm2, DI 0 66  There was moderate regurgitation  AI PHT 456ms      TRICUSPID VALVE:  There was mild to moderate regurgitation  Estimated peak PA pressure was 59 mmHg    The findings suggest moderate pulmonary hypertension    ,  Pulmonary  Smoker ex-smoker  Cumulative Pack Years: 30, Shortness of breath, Recent URI , Sleep apnea CPAP,   Comment: History of acute hypoxic respiratory failure    Resolving pulm edema and pleural effusions thought to be cardiac related      GI/Hepatic  Negative GI/hepatic ROS          Chronic kidney disease stage 3,        Endo/Other  Diabetes poorly controlled type 2 Insulin,   Obesity    GYN  Negative gynecology ROS          Hematology  Anemia ,     Musculoskeletal    Comment: Shoulder arthroscopies Arthritis     Neurology  Negative neurology ROS      Psychology   Negative psychology ROS Physical Exam    Airway    Mallampati score: III  TM Distance: >3 FB  Neck ROM: full     Dental   upper dentures,     Cardiovascular  Rhythm: regular, Rate: normal, Murmur,     Pulmonary  Comment:     , Decreased breath sounds,     Other Findings        Anesthesia Plan  ASA Score- 3     Anesthesia Type- IV sedation with anesthesia with ASA Monitors  Additional Monitors:   Airway Plan:         Plan Factors-  Patient did not smoke on day of surgery  Induction- intravenous  Postoperative Plan-     Informed Consent- Anesthetic plan and risks discussed with patient

## 2020-01-13 NOTE — PROCEDURES
Insert PICC line  Date/Time: 1/13/2020 12:33 PM  Performed by: Stefanie Hale RN  Authorized by: Ventura Rodriguez DO     Patient location:  Bedside  Other Assisting Provider: No    Consent:     Consent obtained:  Written (Orquideamekhi CarverBorges obtained consent )    Consent given by:  Patient    Procedural risks discussed: by MD Ro protocol:     Procedure explained and questions answered to patient or proxy's satisfaction: yes      Relevant documents present and verified: yes      Test results available and properly labeled: yes      Radiology Images displayed and confirmed  If images not available, report reviewed: yes      Required blood products, implants, devices, and special equipment available: yes      Site/side marked: yes      Immediately prior to procedure, a time out was called: yes      Patient identity confirmed:  Verbally with patient and arm band  Pre-procedure details:     Hand hygiene: Hand hygiene performed prior to insertion      Sterile barrier technique: All elements of maximal sterile technique followed      Skin preparation:  ChloraPrep    Skin preparation agent: Skin preparation agent completely dried prior to procedure    Indications:     PICC line indications: long term antibiotics    Anesthesia (see MAR for exact dosages):      Anesthesia method:  Local infiltration    Local anesthetic:  Lidocaine 1% w/o epi (2ml)  Procedure details:     Location:  [place]    Vessel type: vein      Laterality:  Right    Site selection rationale:  Left pacer    Approach: percutaneous technique used      Patient position:  Flat    Procedural supplies:  Double lumen    Catheter size:  5 Fr    Landmarks identified: yes      Ultrasound guidance: yes      Sterile ultrasound techniques: Sterile gel and sterile probe covers were used      Number of attempts:  1    Successful placement: yes      Vessel of catheter tip end:  Chest Xray needed to confirm placement    Total catheter length (cm):  41    Catheter out on skin (cm):  1    Max flow rate:  999ml/hr     Arm circumference:  27  Post-procedure details:     Post-procedure:  Securement device placed and dressing applied    Assessment:  Blood return through all ports and placement verification pending x-ray result    Post-procedure complications: none      Patient tolerance of procedure:   Tolerated well, no immediate complications  Comments:      LOT # [id number] 9-30-20

## 2020-01-13 NOTE — PROGRESS NOTES
Cardiology Progress Note - Nasreen Chavez 75 y o  male MRN: [id number]    Unit/Bed#: E4 -01 Encounter: [id number]      Assessment:  1  MSSA sepsis, 2/2 toe osteomyelitis s/p amputation and revision  · Blood culture - 1/7/20 - 2/2 + for staph aureus  2  Acute hypoxic respiratory failure  3  Acute diastolic heart failure  4  NICOLASA  5  Aortic stenosis, mild + moderate AI  6  Mitral stenosis, moderate  7  Paroxysmal Atrial Fibrillation   8  Hypertension  9  Diabetes Mellitus Type 2   10  S/p PPM  · For complete heart block, SSS  · 12/4/19 - AP 32%,  100%  11  ELZBIETA on CKD    Outpatient cardiologist - [address] [place] [address]  · On IV lasix 40 daily, previously had slight cr increase with IV lasix 40 bid  · UO 1 6 L, Cr stable at 1 9 --> 2  · Still appears volume overloaded on exam, and is more short of breath today with hypoxia to 85% on room air  · Will increase IV lasix to 40 tid for today, and further diuresis based on response  · On metoprolol 50 bid  · Losartan on hold due to ELZBIETA  · A ELBERT has been requested by ID to r/o endocarditis and help determine duration of antibiotic therapy, consider his pacemaker in place  · Have arranged for ELBERT to be done on Wednesday  · Keep NPO after midnight on Tuesday night for same    Subjective:   Feels more short of breath today morning after completing PT  Was hypoxic as well  No fevers    Review of Systems  All other systems negative    Telemetry Review: Not on telemetry    Objective:   Vitals: Blood pressure 130/63, pulse 81, temperature 98 3 °F (36 8 °C), temperature source Tympanic, resp  rate 20, weight 98 9 kg (218 lb 0 6 oz), SpO2 98 %  , Body mass index is 31 57 kg/m² ,   Orthostatic Blood Pressures      Most Recent Value   Blood Pressure  130/63 filed at 01/13/2020 1100   Patient Position - Orthostatic VS  Lying filed at 01/13/2020 2999         Systolic (74ATE), YZP:958 , Min:121 , YRF:172     Diastolic (42LCH), SKR:70, Min:43, Max:67    Wt Readings from Last 5 Encounters:   01/13/20 98 9 kg (218 lb 0 6 oz)   12/13/19 96 2 kg (212 lb)   12/03/19 97 5 kg (215 lb)   09/26/19 94 6 kg (208 lb 9 6 oz)   09/03/19 93 kg (205 lb)     I/O       01/11 0701 - 01/12 0700 01/12 0701 - 01/13 0700 01/13 0701 - 01/14 0700    P  O   300 380    I V  (mL/kg)  500 (5 1)     IV Piggyback 100 50     Total Intake(mL/kg) 100 (1) 850 (8 6) 380 (3 8)    Urine (mL/kg/hr) 1700 (0 7) 1550 (0 7) 300 (0 7)    Blood  50     Total Output 1700 1600 300    Net -1600 -750 +80                     Physical Exam   Constitutional: He is oriented to person, place, and time  He appears well-developed and well-nourished  No distress  HENT:   Head: Normocephalic and atraumatic  Eyes: No scleral icterus  Neck: Neck supple  JVD present  Cardiovascular: Normal rate, regular rhythm and normal heart sounds  Exam reveals no gallop and no friction rub  No murmur heard  Pulmonary/Chest: Effort normal and breath sounds normal  No respiratory distress  He has no wheezes  He has no rales  He exhibits no tenderness  Abdominal: Soft  He exhibits no distension  There is no tenderness  Musculoskeletal: He exhibits edema and deformity  Toe amputation stump and wound dressing noted   Neurological: He is alert and oriented to person, place, and time  Skin: Skin is warm  Psychiatric: He has a normal mood and affect  Vitals reviewed              Laboratory Results: personally reviewed  Results from last 7 days   Lab Units 01/08/20  0602 01/07/20  2135 01/07/20  1652   TROPONIN I ng/mL 0 65* 0 56* 0 43*       CBC with diff:   Results from last 7 days   Lab Units 01/13/20  0634 01/12/20  1026 01/10/20  0502 01/09/20  0410 01/08/20  0602 01/07/20  1207   WBC Thousand/uL 15 34* 13 39* 10 27* 14 32* 17 09* 20 41*   HEMOGLOBIN g/dL 7 5* 8 2* 8 1* 9 0* 8 4* 9 3*   HEMATOCRIT % 22 7* 24 7* 24 9* 27 8* 25 3* 27 8*   MCV fL 92 91 92 93 93 93   PLATELETS Thousands/uL 372 348 248 286 232 240   MCH pg 30 5 30 3 29 9 30 2 30 9 31 0   MCHC g/dL 33 0 33 2 32 5 32 4 33 2 33 5   RDW % 14 0 13 9 14 0 14 2 14 5 14 2   MPV fL 9 5 9 2 9 8 9 9 9 3 9 3   NRBC AUTO /100 WBCs 0  --  0 0 0 0         CMP:  Results from last 7 days   Lab Units 01/13/20  0634 01/12/20  0450 01/10/20  0502 01/09/20  0410 01/08/20  0602 01/07/20  1207   POTASSIUM mmol/L 3 8 3 6 4 1 4 3 4 4 4 3   CHLORIDE mmol/L 97* 97* 98* 98* 97* 96*   CO2 mmol/L 22 22 21 20* 22 21   BUN mg/dL 81* 81* 83* 70* 61* 51*   CREATININE mg/dL 2 01* 1 90* 2 24* 2 32* 2 42* 2 08*   CALCIUM mg/dL 8 1* 8 2* 8 2* 8 8 8 3 8 7   AST U/L  --   --   --   --   --  24   ALT U/L  --   --   --   --   --  24   ALK PHOS U/L  --   --   --   --   --  83   EGFR ml/min/1 73sq m 34 36 29 28 27 32         BMP:  Results from last 7 days   Lab Units 01/13/20  0634 01/12/20  0450 01/10/20  0502 01/09/20  0410 01/08/20  0602 01/07/20  1207   POTASSIUM mmol/L 3 8 3 6 4 1 4 3 4 4 4 3   CHLORIDE mmol/L 97* 97* 98* 98* 97* 96*   CO2 mmol/L 22 22 21 20* 22 21   BUN mg/dL 81* 81* 83* 70* 61* 51*   CREATININE mg/dL 2 01* 1 90* 2 24* 2 32* 2 42* 2 08*   CALCIUM mg/dL 8 1* 8 2* 8 2* 8 8 8 3 8 7       BNP: No results for input(s): BNP in the last 72 hours      Magnesium:       Coags:   Results from last 7 days   Lab Units 01/07/20  1207   PTT seconds 49*   INR  1 89*       TSH:        Hemoglobin A1C   Results from last 7 days   Lab Units 01/07/20  1207   HEMOGLOBIN A1C % 6 4*       Lipid Profile:       Meds/Allergies   all current active meds have been reviewed and current meds:   Current Facility-Administered Medications   Medication Dose Route Frequency    acetaminophen (TYLENOL) tablet 650 mg  650 mg Oral Q6H PRN    allopurinol (ZYLOPRIM) tablet 300 mg  300 mg Oral QAM    amLODIPine (NORVASC) tablet 10 mg  10 mg Oral Daily    ceFAZolin (ANCEF) IVPB (premix) 2,000 mg  2,000 mg Intravenous Q8H    furosemide (LASIX) injection 40 mg  40 mg Intravenous Daily    guaiFENesin (MUCINEX) 12 hr tablet 600 mg  600 mg Oral BID    insulin glargine (LANTUS) subcutaneous injection 20 Units 0 2 mL  20 Units Subcutaneous HS    insulin lispro (HumaLOG) 100 units/mL subcutaneous injection 2-12 Units  2-12 Units Subcutaneous TID AC    ipratropium-albuterol (DUO-NEB) 0 5-2 5 mg/3 mL inhalation solution 3 mL  3 mL Nebulization Q6H PRN    melatonin tablet 6 mg  6 mg Oral HS    metoprolol tartrate (LOPRESSOR) tablet 50 mg  50 mg Oral Q12H REBA    oxyCODONE (ROXICODONE) IR tablet 5 mg  5 mg Oral Q4H PRN    pravastatin (PRAVACHOL) tablet 40 mg  40 mg Oral Daily With Dinner    tamsulosin (FLOMAX) capsule 0 4 mg  0 4 mg Oral Daily With Dinner     Medications Prior to Admission   Medication    allopurinol (ZYLOPRIM) 300 mg tablet    amLODIPine (NORVASC) 10 mg tablet    apixaban (ELIQUIS) 5 mg    betamethasone valerate (VALISONE) 0 1 % cream    Cholecalciferol (VITAMIN D-3) 1000 units CAPS    Dupilumab, Asthma, (DUPIXENT SC)    econazole nitrate 1 % cream    furosemide (LASIX) 40 mg tablet    losartan (COZAAR) 50 mg tablet    metFORMIN (GLUCOPHAGE) 500 mg tablet    metolazone (ZAROXOLYN) 5 mg tablet    metoprolol tartrate (LOPRESSOR) 50 mg tablet    Multiple Vitamin (MULTIVITAMIN) tablet    omega-3-acid ethyl esters (LOVAZA) 1 g capsule    simvastatin (ZOCOR) 20 mg tablet    tamsulosin (FLOMAX) 0 4 mg    glucagon (GLUCAGON EMERGENCY) 1 MG injection    glucose blood (ONE TOUCH ULTRA TEST) test strip    hydrOXYzine HCL (ATARAX) 10 mg tablet    Insulin Degludec-Liraglutide (XULTOPHY) 100 units-3 6 mg/mL injection pen    ONE TOUCH ULTRA TEST test strip            Cardiac testing: reviewed  Results for orders placed during the hospital encounter of 01/07/20   Echo complete with contrast if indicated    [address]    [place], [address]  [phone]    Transthoracic Echocardiogram  2D, M-mode, Doppler, and Color Doppler    Study date:  10-Rohit-2020    Patient: Wes MCCALL number: [id number]  Account number: [de-identified]  : 1955  Age: 75 years  Gender: Male  Status: Inpatient  Location: Bedside  Height: 70 in  Weight: 204 6 lb  BP: 125/ 62 mmHg    Indications: Bacteremia  Rule out endocarditis  Diagnoses: R78 81 - Bacteremia    Sonographer:  PEDRO PABLO Narayan  Primary Physician:  Gumaro Morrison DO  Referring Physician:  Muller CRNP  Group:  [id number]  Interpreting Physician:  BEAN Kern     SUMMARY    LEFT VENTRICLE:  Systolic function was normal by visual assessment  Ejection fraction was estimated to be 55 %  There were no regional wall motion abnormalities  Wall thickness was mildly increased  There was mild concentric hypertrophy  LEFT ATRIUM:  The atrium was moderately dilated  MITRAL VALVE:  There was marked annular calcification  There was marked calcification of the posterior leaflet, with mild chordal involvement  There was severely restricted mobility of the posterior leaflet  There was moderate stenosis  Mean gradient of 5 6 mmHg at heart rate of 60 bpm   There was mild regurgitation  AORTIC VALVE:  There was mild stenosis  VMax 2 28 m/s, Mean Gradient 11 50 mmHg, FITO 2 1 cm2, DI 0 66  There was moderate regurgitation  AI PHT 456ms  TRICUSPID VALVE:  There was mild to moderate regurgitation  Estimated peak PA pressure was 59 mmHg  The findings suggest moderate pulmonary hypertension  IVC, HEPATIC VEINS:  The inferior vena cava was dilated  The respirophasic change in diameter was more than 50%  PERICARDIUM:  A trace pericardial effusion was identified posterior to the heart  There was a moderate-sized left pleural effusion  SUMMARY MEASUREMENTS  2D measurements:  Unspecified Anatomy:   %FS was 35 %  Ao Diam was 3 1 cm   EDV(Teich) was 130 9 ml   EF(Teich) was 63 8 %  ESV(Teich) was 47 3 ml  IVSd was 1 2 cm  LA Diam was 5 cm  LAAs A4C was 28 8 cm2  LAESV A-L A4C was 100 ml  LAESV MOD A4C was  89 ml  LALs A4C was 7 cm  LVEDV MOD A4C was 106 4 ml  LVEF MOD A4C was 60 2 %  LVESV MOD A4C was 42 3 ml  LVIDd was 5 2 cm  LVIDs was 3 4 cm  LVLd A4C was 8 8 cm  LVLs A4C was 7 5 cm  LVOT Diam was 2 cm  LVPWd was 1 2 cm  RA  Area was 30 1 cm2  RVIDd was 4 cm  RWT was 0 5   SV MOD A4C was 64 1 ml   SV(Teich) was 83 6 ml   CW measurements:  Unspecified Anatomy:   AR Dec [place] was 2 9 m/s2  AR Dec Time was 1572 1 ms  AR PHT was 455 9 ms  AR Vmax was 4 6 m/s  AR maxPG was 83 3 mmHg  AV Env  Ti was 294 3 ms   AV VTI was 46 7 cm  AV Vmax was 2 3 m/s  AV Vmean was 1 6 m/s  AV maxPG was 20 9 mmHg  AV meanPG was 11 5 mmHg  HR was 59 9 BPM   MV VTI was 57 cm  MV Vmax was 1 9 m/s  MV Vmean was 0 9 m/s  MV maxPG was 15 1 mmHg  MV meanPG was 4 7 mmHg  TR Vmax was 3 9 m/s   TR maxPG was 62 4 mmHg  MM measurements:  Unspecified Anatomy:   TAPSE was 2 6 cm  PW measurements:  Unspecified Anatomy:   FITO (VTI) was 2 cm2  FITO Vmax was 1 9 cm2  AVAI (VTI) was 0 cm2/m2  AVAI Vmax was 0 cm2/m2  DVI was 0 7   E' Lat was 0 1 m/s  E' Sept was 0 1 m/s  LVOT Env  Ti was 290 5 ms  LVOT VTI was 31 6 cm  LVOT Vmax  was 1 4 m/s  LVOT Vmean was 1 1 m/s  LVOT maxPG was 8 2 mmHg  LVOT meanPG was 5 2 mmHg  LVSI Dopp was 44 9 ml/m2  LVSV Dopp was 94 8 ml  MVA (VTI) was 1 7 cm2  RV S' was 0 2 m/s  HISTORY: PRIOR HISTORY: Congestive heart failure  Peripheral vascular disease  Atrial fibrillation  Risk factors: hypertension, diabetes, and medication-treated hypercholesterolemia, NICOLASA  PRIOR PROCEDURES: Pacemaker implantation  PROCEDURE: The procedure was performed at the bedside  This was a routine study  The transthoracic approach was used  The study included complete 2D imaging, M-mode, complete spectral Doppler, and color Doppler  The heart rate was 62 bpm,  at the start of the study  Image quality was adequate      LEFT VENTRICLE: Size was normal  Systolic function was normal by visual assessment  Ejection fraction was estimated to be 55 %  There were no regional wall motion abnormalities  Wall thickness was mildly increased  There was mild  concentric hypertrophy  RIGHT VENTRICLE: The size was normal  Systolic function was normal  Wall thickness was normal     LEFT ATRIUM: The atrium was moderately dilated  RIGHT ATRIUM: Size was normal  A pacing wire was present  MITRAL VALVE: There was marked annular calcification  There was marked calcification of the posterior leaflet, with mild chordal involvement  There was severely restricted mobility of the posterior leaflet  DOPPLER: There was moderate  stenosis  Mean gradient of 5 6 mmHg at heart rate of 60 bpm  There was mild regurgitation  AORTIC VALVE: The valve was trileaflet  Leaflets exhibited normal thickness, mild to moderate calcification, and normal cuspal separation  DOPPLER: Transaortic velocity was within the normal range  There was mild stenosis  VMax 2 28 m/s,  Mean Gradient 11 50 mmHg, FITO 2 1 cm2, DI 0 66  There was moderate regurgitation  AI PHT 456ms  TRICUSPID VALVE: The valve structure was normal  There was normal leaflet separation  DOPPLER: The transtricuspid velocity was within the normal range  There was no evidence for stenosis  There was mild to moderate regurgitation  Estimated  peak PA pressure was 59 mmHg  The findings suggest moderate pulmonary hypertension  PULMONIC VALVE: Not well visualized  DOPPLER: There was no evidence for stenosis  There was no significant regurgitation  PERICARDIUM: A trace pericardial effusion was identified posterior to the heart  There was a moderate-sized left pleural effusion  AORTA: The root exhibited normal size  SYSTEMIC VEINS: IVC: The inferior vena cava was dilated  The respirophasic change in diameter was more than 50%      SYSTEM MEASUREMENT TABLES    2D  %FS: 35 %  Ao Diam: 3 1 cm  EDV(Teich): 130 9 ml  EF(Teich): 63 8 %  ESV(Teich): 47 3 ml  IVSd: 1 2 cm  LA Diam: 5 cm  LAAs A4C: 28 8 cm2  LAESV A-L A4C: 100 ml  LAESV MOD A4C: 89 ml  LALs A4C: 7 cm  LVEDV MOD A4C: 106 4 ml  LVEF MOD A4C: 60 2 %  LVESV MOD A4C: 42 3 ml  LVIDd: 5 2 cm  LVIDs: 3 4 cm  LVLd A4C: 8 8 cm  LVLs A4C: 7 5 cm  LVOT Diam: 2 cm  LVPWd: 1 2 cm  RA Area: 30 1 cm2  RVIDd: 4 cm  RWT: 0 5  SV MOD A4C: 64 1 ml  SV(Teich): 83 6 ml    CW  AR Dec [place]: 2 9 m/s2  AR Dec Time: 1572 1 ms  AR PHT: 455 9 ms  AR Vmax: 4 6 m/s  AR maxP 3 mmHg  AV Env  Ti: 294 3 ms  AV VTI: 46 7 cm  AV Vmax: 2 3 m/s  AV Vmean: 1 6 m/s  AV maxP 9 mmHg  AV meanP 5 mmHg  HR: 59 9 BPM  MV VTI: 57 cm  MV Vmax: 1 9 m/s  MV Vmean: 0 9 m/s  MV maxPG: 15 1 mmHg  MV meanP 7 mmHg  TR Vmax: 3 9 m/s  TR maxP 4 mmHg    MM  TAPSE: 2 6 cm    PW  FITO (VTI): 2 cm2  FITO Vmax: 1 9 cm2  AVAI (VTI): 0 cm2/m2  AVAI Vmax: 0 cm2/m2  DVI: 0 7  E' Lat: 0 1 m/s  E' Sept: 0 1 m/s  LVOT Env  Ti: 290 5 ms  LVOT VTI: 31 6 cm  LVOT Vmax: 1 4 m/s  LVOT Vmean: 1 1 m/s  LVOT maxP 2 mmHg  LVOT meanP 2 mmHg  LVSI Dopp: 44 9 ml/m2  LVSV Dopp: 94 8 ml  MVA (VTI): 1 7 cm2  RV S': 0 2 m/s    [employer] Accredited Echocardiography [facility]    Prepared and electronically signed by    Tisha Fabry, D O  Signed 10-Rohit-2020 14:32:27       Results for orders placed during the hospital encounter of 05/15/19   ELBERT    Narrative Edgardo Barajas MD     2019 12:01 PM  Procedure note:  ELBERT Preliminary Report    Impression:      Probably moderate to severe mitral stenosis  Calcified mitral   valve with restricted opening  Gradient is approximately 7 mmHg   which is in the moderate range but visually the valve looks more   significantly stenotic  Full report to follow  No results found for this or any previous visit  No results found for this or any previous visit

## 2020-01-13 NOTE — PLAN OF CARE
Problem: Potential for Falls  Goal: Patient will remain free of falls  Description  INTERVENTIONS:  - Assess patient frequently for physical needs  -  Identify cognitive and physical deficits and behaviors that affect risk of falls    -  [place] fall precautions as indicated by assessment   - Educate patient/family on patient safety including physical limitations  - Instruct patient to call for assistance with activity based on assessment  - Modify environment to reduce risk of injury  - Consider OT/PT consult to assist with strengthening/mobility  Outcome: Progressing     Problem: PAIN - ADULT  Goal: Verbalizes/displays adequate comfort level or baseline comfort level  Description  Interventions:  - Encourage patient to monitor pain and request assistance  - Assess pain using appropriate pain scale  - Administer analgesics based on type and severity of pain and evaluate response  - Implement non-pharmacological measures as appropriate and evaluate response  - Consider cultural and social influences on pain and pain management  - Notify physician/advanced practitioner if interventions unsuccessful or patient reports new pain  Outcome: Progressing     Problem: INFECTION - ADULT  Goal: Absence or prevention of progression during hospitalization  Description  INTERVENTIONS:  - Assess and monitor for signs and symptoms of infection  - Monitor lab/diagnostic results  - Monitor all insertion sites, i e  indwelling lines, tubes, and drains  - Administer medications as ordered  - Instruct and encourage patient and family to use good hand hygiene technique  - Identify and instruct in appropriate isolation precautions for identified infection/condition   Outcome: Progressing     Problem: SAFETY ADULT  Goal: Maintain or return to baseline ADL function  Description  INTERVENTIONS:  -  Assess patient's ability to carry out ADLs; assess patient's baseline for ADL function and identify physical deficits which impact ability to perform ADLs (bathing, care of mouth/teeth, toileting, grooming, dressing, etc )  - Assess/evaluate cause of self-care deficits   - Assess range of motion  - Assess patient's mobility; develop plan if impaired  - Assess patient's need for assistive devices and provide as appropriate  - Encourage maximum independence but intervene and supervise when necessary  - Involve family in performance of ADLs  - Assess for home care needs following discharge   - Consider OT consult to assist with ADL evaluation and planning for discharge  - Provide patient education as appropriate  Outcome: Progressing  Goal: Maintain or return mobility status to optimal level  Description  INTERVENTIONS:  - Assess patient's baseline mobility status (ambulation, transfers, stairs, etc )    - Identify cognitive and physical deficits and behaviors that affect mobility  - Identify mobility aids required to assist with transfers and/or ambulation (gait belt, sit-to-stand, lift, walker, cane, etc )  - [place] fall precautions as indicated by assessment  - Record patient progress and toleration of activity level on Mobility SBAR; progress patient to next Phase/Stage  - Instruct patient to call for assistance with activity based on assessment  - Consider rehabilitation consult to assist with strengthening/weightbearing, etc   Outcome: Progressing     Problem: DISCHARGE PLANNING  Goal: Discharge to home or other facility with appropriate resources  Description  INTERVENTIONS:  - Identify barriers to discharge w/patient and caregiver  - Arrange for needed discharge resources and transportation as appropriate  - Identify discharge learning needs (meds, wound care, etc )  - Arrange for interpretive services to assist at discharge as needed  - Refer to Case Management Department for coordinating discharge planning if the patient needs post-hospital services based on physician/advanced practitioner order or complex needs related to functional status, cognitive ability, or social support system  Outcome: Progressing     Problem: Knowledge Deficit  Goal: Patient/family/caregiver demonstrates understanding of disease process, treatment plan, medications, and discharge instructions  Description  Complete learning assessment and assess knowledge base    Interventions:  - Provide teaching at level of understanding  - Provide teaching via preferred learning methods  Outcome: Progressing     Problem: CARDIOVASCULAR - ADULT  Goal: Maintains optimal cardiac output and hemodynamic stability  Description  INTERVENTIONS:  - Monitor I/O, vital signs and rhythm  - Monitor for S/S and trends of decreased cardiac output  - Administer and titrate ordered vasoactive medications to optimize hemodynamic stability  - Assess quality of pulses, skin color and temperature  - Assess for signs of decreased coronary artery perfusion  - Instruct patient to report change in severity of symptoms  Outcome: Progressing  Goal: Absence of cardiac dysrhythmias or at baseline rhythm  Description  INTERVENTIONS:  - Continuous cardiac monitoring, vital signs, obtain 12 lead EKG if ordered  - Administer antiarrhythmic and heart rate control medications as ordered  - Monitor electrolytes and administer replacement therapy as ordered  Outcome: Progressing     Problem: RESPIRATORY - ADULT  Goal: Achieves optimal ventilation and oxygenation  Description  INTERVENTIONS:  - Assess for changes in respiratory status  - Assess for changes in mentation and behavior  - Position to facilitate oxygenation and minimize respiratory effort  - Oxygen administered by appropriate delivery if ordered  - Initiate smoking cessation education as indicated  - Encourage broncho-pulmonary hygiene including cough, deep breathe, Incentive Spirometry  - Assess the need for suctioning and aspirate as needed  - Assess and instruct to report SOB or any respiratory difficulty  - Respiratory Therapy support as indicated  Outcome: Progressing     Problem: Prexisting or High Potential for Compromised Skin Integrity  Goal: Skin integrity is maintained or improved  Description  INTERVENTIONS:  - Identify patients at risk for skin breakdown  - Assess and monitor skin integrity  - Assess and monitor nutrition and hydration status  - Monitor labs   - Assess for incontinence   - Turn and reposition patient  - Assist with mobility/ambulation  - Relieve pressure over bony prominences  - Avoid friction and shearing  - Provide appropriate hygiene as needed including keeping skin clean and dry  - Evaluate need for skin moisturizer/barrier cream  - Collaborate with interdisciplinary team   - Patient/family teaching  - Consider wound care consult   Outcome: Progressing

## 2020-01-13 NOTE — PROGRESS NOTES
[facility] Internal [facility] Progress Note  Patient: Shira Sanders 75 y o  male   MRN: [id number]  PCP: Nadine Mckeon DO  Unit/Bed#: E4 -01 Encounter: [id number]  Date Of Visit: 01/13/20      Assessment/plan  1  Severe sepsis due to MSSA bacteremia from left hallux wound- appreciate ID recommendations  Continue ancef  Consent obtained for picc  Pt will need ELBERT  He is s/p Left hallux amputation on 1/8/2020 and s/p I and D with removal of sesmoid bone on 1/12/2020  Appreciate podiatry recommendaitons    2  Acute respiratory failure with hypoxia due to acute diastolic chf  Continue diuresis  Continue to wean oxygen as tolerated    3  Acute diastolic congestive heart failure  Appreciate cardiology recommendations  Continue IV lasix  4  Acute renal failure/ckd3- baselines creatinine is 1 1-1 4  Currently creatinine is 2 01  Arb is being held  Will check bmp in am      5  Type 2 diabetes with hyperglycemia- will increase lantus to 20 units  Will continue insulin sliding scale  Continue to hold metformin    6  Obstructive sleep apnea    7  Paroxysmal atrial fibrillation- eliquis held for procedure  Will restart eliquis when on further procedures are planned  Continue meotprolol    8  Hyponatremia    9 History of complete heart block, status post pacemaker    10  Normocytic anemia- likely due to acute blood loss anemia from recent procedures  Will monitor       Subjective:   Pt seen and examined  Pt denies worsened sob no f/c no cp no n/v/d no abd pain  His pain in his foot is controlled  Objective:     Vitals: Blood pressure 141/67, pulse 61, temperature 97 9 °F (36 6 °C), temperature source Tympanic, resp  rate 20, weight 98 9 kg (218 lb 0 6 oz), SpO2 97 %  ,Body mass index is 31 57 kg/m²      Lab, Imaging and other studies:  Results from last 7 days   Lab Units 01/13/20  0634  01/07/20  1207   WBC Thousand/uL 15 34*   < > 20 41*   HEMOGLOBIN g/dL 7 5*   < > 9 3*   HEMATOCRIT % 22 7*   < > 27 8* PLATELETS Thousands/uL 372   < > 240   INR   --   --  1 89*    < > = values in this interval not displayed  Results from last 7 days   Lab Units 01/13/20  0634  01/07/20  1207   POTASSIUM mmol/L 3 8   < > 4 3   CHLORIDE mmol/L 97*   < > 96*   CO2 mmol/L 22   < > 21   BUN mg/dL 81*   < > 51*   CREATININE mg/dL 2 01*   < > 2 08*   CALCIUM mg/dL 8 1*   < > 8 7   ALK PHOS U/L  --   --  83   ALT U/L  --   --  24   AST U/L  --   --  24    < > = values in this interval not displayed  Results from last 7 days   Lab Units 01/08/20  0602 01/07/20  2135 01/07/20  1652   TROPONIN I ng/mL 0 65* 0 56* 0 43*     Lab Results   Component Value Date    BLOODCX No Growth at 72 hrs  01/09/2020    BLOODCX No Growth at 72 hrs  01/09/2020    BLOODCX Staphylococcus aureus (A) 01/07/2020    BLOODCX Staphylococcus aureus (A) 01/07/2020    WOUNDCULT 4+ Growth of Escherichia coli 07/19/2017    WOUNDCULT 1+ Growth of Mixed Skin Jem 07/19/2017    WOUNDCULT Growth in Broth culture only Citrobacter freundii 05/02/2017    SPUTUMCULTUR 2+ Growth of  05/03/2019    SPUTUMCULTUR  05/03/2019     Commensal respiratory jem only; No significant growth of Staph aureus/MRSA or Pseudomonas aeruginosa       Scheduled Meds:   Current Facility-Administered Medications:  acetaminophen 650 mg Oral Q6H PRN Terrilee Kike, DPM    allopurinol 300 mg Oral QAM Terrilee Kike, DPM    amLODIPine 10 mg Oral Daily Terrilee Kike, DPM    cefazolin 2,000 mg Intravenous Q8H Terrilee Kike, DPM Last Rate: 2,000 mg (01/13/20 8954)   furosemide 40 mg Intravenous Daily Terrilee Kike, DPM    guaiFENesin 600 mg Oral BID Terrilee Kike, DPM    insulin glargine 18 Units Subcutaneous HS Terrilee Kike, DPM    insulin lispro 2-12 Units Subcutaneous TID AC Terrilee Kike, DPM    ipratropium-albuterol 3 mL Nebulization Q6H PRN Terrilee Kike, DPM    melatonin 6 mg Oral HS Terrilee Kike, DPM    metoprolol tartrate 50 mg Oral Q12H [address] Terrilee Kike, DPM    oxyCODONE 5 mg Oral Q4H PRN Terrilee Kike, DPM    pravastatin 40 mg Oral Daily With Toro Leiva Cha, DPFELTON    tamsulosin 0 4 mg Oral Daily With Toro Leiva Cha DPFELTON      Continuous Infusions:    PRN Meds:   acetaminophen    ipratropium-albuterol    oxyCODONE      Physical exam:  Physical Exam  General appearance: alert and oriented, in no acute distress  Head: Normocephalic, without obvious abnormality, atraumatic  Eyes: conjunctivae/corneas clear  PERRL, EOM's intact  Fundi benign  Neck: no adenopathy, no carotid bruit, no JVD, supple, symmetrical, trachea midline and thyroid not enlarged, symmetric, no tenderness/mass/nodules  Lungs: slight crackles at bases bilateral  Heart: regular rate and rhythm, S1, S2 normal, no murmur, click, rub or gallop  Abdomen: soft, non-tender; bowel sounds normal; no masses,  no organomegaly  Extremities: bandage c/d/i  Pulses: 2+ and symmetric  Skin: bandage c/d/i  Neurologic: Mental status: Alert, oriented, thought content appropriate      VTE Pharmacologic Prophylaxis: eliquis on hold  Will restart if no further procedures are planned     VTE Mechanical Prophylaxis: sequential compression device    Counseling / Coordination of Care  Total floor / unit time spent today 20 minutes    Current Length of Stay: 6 day(s)    Current Patient Status: Inpatient     Code Status: Level 1 - Full Code

## 2020-01-13 NOTE — PROGRESS NOTES
Per CXR PICC in the IJ  Spoke to primary RN Rafi Victoria he is aware patient needs a PICC repo order in IR

## 2020-01-13 NOTE — PROGRESS NOTES
Progress Note - Podiatry  Brandon Seo 75 y o  male MRN: [id number]  Unit/Bed#: E4 -01 Encounter: [id number]    Assessment/Plan:  1  S/pI and D with removal of sesamoid on 1/12//20 and s/p  left hallux amputation on 01/08/2020 due to underlying clinical OM, abscess, and possible infectious tenosynovitis   2  Sepsis - POA  3  Bacteremia likely secondary to #1  4  DM type 2     -c/w IV abx per ID , wbc: 15 34;  ELBERT per ID  -eventual PICC per primary team  -monitor off eliquis for now, ok per Cardio  -f/u LEADs due to poor blood flow intra-op  -will c/w monitoring wound flap for healing/signs of perfusion  -f/u intra-op Wcx from 1/11; original Wcx show Staph aureus  -will change dressing tomorrow and monitor for signs of infection due to remaining leukocytosis  -rest of care per primary team    Subjective/Objective   Chief Complaint:   Chief Complaint   Patient presents with    Fatigue     Pt with multiple complaints: sore throat, diarrhea, fatigue, fevers, tylenol at 0930  -ill contacts  Subjective: 75 y o  y/o male was seen and evaluated at bedside  Blood pressure 141/67, pulse 61, temperature 97 9 °F (36 6 °C), temperature source Tympanic, resp  rate 20, weight 98 9 kg (218 lb 0 6 oz), SpO2 97 %  ,Body mass index is 31 57 kg/m²  Invasive Devices     Peripheral Intravenous Line            Peripheral IV 01/12/20 Right Forearm 1 day                Physical Exam:   General: Alert, cooperative and no distress  Lungs: Non labored breathing  Heart: Positive S1, S2  Abdomen: Soft, non-tender  Extremity:     nvs and gross motor function at baseline, incision stable without acute signs of infection, no active drainage, no dehiscence

## 2020-01-13 NOTE — PLAN OF CARE
Problem: PHYSICAL THERAPY ADULT  Goal: Performs mobility at highest level of function for planned discharge setting  See evaluation for individualized goals  Description  Treatment/Interventions: Functional transfer training, Therapeutic exercise, Elevations, Patient/family training, Equipment eval/education, Bed mobility, Gait training  Equipment Recommended: Jaki Del Valle, Wheelchair       See flowsheet documentation for full assessment, interventions and recommendations  Note:   Prognosis: Good  Problem List: Decreased strength, Impaired balance, Decreased mobility, Decreased skin integrity, Orthopedic restrictions, Impaired judgement, Decreased range of motion  Assessment: Aaliyah Han was seen for a follow up session today  Pt initially stated he could not participate in therapy because of his LLE; educated pt on purpose of IP PT, frequency of PT during hospital stay and safe mobility maintaining NWB LLE- pt verbalize understanding  Pt then reported fatigue and anxiety stating "I'm just not made for this"  Encourage pt on deep breathing techniques to address anxiety, and reinforced importance of IP PT to achieve patient reported goal of returning home  Offered to perform bed level therapy this session 2* to increased fatigue and anxiety; pt agreeable  Instructed and demonstrated to pt HEP including SLR, LAQ and marching  During session pt requesting to be put on supplemental oxygen due to anxiety and feeling SOB; pt with no observable signs of SOB however O2 sats recorded as 82-82% at rest on RA  Reinforced deep breathing techniques and updated nsg  End of session pt seated EOB demonstrating good balance  Call bell and all needs in reach  Nsg present  Barriers to Discharge: None     Recommendation: Short-term skilled PT, Home with family support, Outpatient PT(pt declining STR; pt prefer d/c home w OPPT )     PT - OK to Discharge: Yes    See flowsheet documentation for full assessment

## 2020-01-13 NOTE — CONSULTS
Consulted for diabetic with wound  Provided verbal and written DM diet education  Reviewed CHO and non CHO foods, discussed balanced meals, encouraged lean protein, high fiber  Reviewed healthy snack options to include protein  Discussed richard supplement for wound healing  Currently on CCD2, added 2gm Na diet d/t CHF and richard BID for wound healing

## 2020-01-13 NOTE — NURSING NOTE
Physical therapy report to RN pt feeling SOB  O2 Sat 86% room air  Pt placed on 2L O2 NC and encouraged to use incentive spirometer  O2 sat up to 94% 2L  Will continue to monitor and encourage use of incentive spirometer  Dr Trini Beckwith notified  Cardiology at bedside

## 2020-01-13 NOTE — PHYSICAL THERAPY NOTE
PHYSICAL THERAPY NOTE          Patient Name: Angelica Abarca  [id number] Date: 1/13/2020   Time in: 1120 Time Out: 5418       01/13/20 1135   Pain Assessment   Pain Assessment No/denies pain   Pain Score No Pain   Restrictions/Precautions   Weight Bearing Precautions Per Order Yes   LLE Weight Bearing Per Order NWB   Other Precautions WBS; Multiple lines; Fall Risk  (anxiety)   General   Chart Reviewed Yes   Response to Previous Treatment Other (Comment)  (pt reporting anxiety)   Family/Caregiver Present No   Cognition   Overall Cognitive Status WFL   Arousal/Participation Cooperative   Attention Within functional limits   Memory Decreased recall of precautions   Following Commands Follows one step commands without difficulty   Comments poor insight into deficits and as a result ? judgement  pt reporting anxiety significantly impacting insight and judgement and limited activity tolerance   Subjective   Subjective "I'm having a lot of anxiety about being in the hospital"   Bed Mobility   Supine to Sit 5  Supervision   Additional items HOB elevated; Bedrails; Increased time required   Balance   Static Sitting Good   Dynamic Sitting Good   Activity Tolerance   Activity Tolerance Other (Comment)  (anxiety)   Exercises   Hip Flexion Supine;10 reps;AROM; Left   Knee AROM Long Arc Quad Sitting;10 reps;AROM; Left   Marching Sitting;10 reps;AROM; Left   Assessment   Prognosis Good   Problem List Decreased strength; Impaired balance;Decreased mobility; Decreased skin integrity;Orthopedic restrictions; Impaired judgement;Decreased range of motion   Assessment Cholo Curtis was seen for a follow up session today  Pt initially stated he could not participate in therapy because of his LLE; educated pt on purpose of IP PT, frequency of PT during hospital stay and safe mobility maintaining NWB LLE- pt verbalize understanding   Pt then reported fatigue and anxiety stating "I'm just not made for this"  Encourage pt on deep breathing techniques to address anxiety, and reinforced importance of IP PT to achieve patient reported goal of returning home  Offered to perform bed level therapy this session 2* to increased fatigue and anxiety; pt agreeable  Instructed and demonstrated to pt HEP including SLR, LAQ and marching  During session pt requesting to be put on supplemental oxygen due to anxiety and feeling SOB; pt with no observable signs of SOB however O2 sats recorded as 82-82% at rest on RA  Reinforced deep breathing techniques and updated nsg  End of session pt seated EOB demonstrating good balance  Call bell and all needs in reach  Nsg present  Goals   Patient Goals to go home   STG Expiration Date 01/19/02   Short Term Goal #1 1)  Perform all transfers with Annalise demonstrating safe and appropriate technique 100% of the time in order to improve ability to negotiate safely in home environment  2) Amb with RW 36' with mod I in order to demonstrate ability to negotiate in home environment  3)  Improve overall strength and balance 1/2 grade in order to optimize ability to perform functional tasks and reduce fall risk  4) Increase activity tolerance to 45 minutes in order to improve endurance to functional tasks  5)  Negotiate stairs using most appropriate technique and S in order to be able to negotiate safely in home environment  6) PT for ongoing patient and family/caregiver education, DME needs and d/c planning in order to promote highest level of function in least restrictive environment  PT Treatment Day 2   Plan   Treatment/Interventions Functional transfer training; Therapeutic exercise;Elevations; Patient/family training;Equipment eval/education; Bed mobility;Gait training;LE strengthening/ROM; Compensatory technique education;Spoke to nursing   Progress Slow progress, decreased activity tolerance  (due to anxiety, fatigue)   PT Frequency Other (Comment)  (5x/wk)   Recommendation Recommendation Short-term skilled PT; Home with family support; Outpatient PT  (pt declining STR; pt prefer d/c home w OPPT )   Equipment Recommended Wheelchair;Walker   PT - OK to Discharge Yes   Additional Comments to STR when medically stable     Brennan Outlaw, PT

## 2020-01-13 NOTE — SOCIAL WORK
MD reports pt got PICC line and will need ANCEF 2,000 every 8 hours  Rec a call from spouse and explained about IV abx  Spouse wants this MSW to talk with pt about this  Spouse is asking for w/c if pt goes home  Met with pt to discuss the need for IV abx and PT needs  PT recommendation is SNF vs Home PT  Patient stated he wants to go home and have [facility] for RN, but do OP PT  Informed pt that while RN is coming out he will need to do home PT and then switch to OP PT  Informed pt that medicare covers IV AB in SNF, but will get cost for OP  MD is aware will need script for IV Abx and w/c  Referral made to [facility] for RN and PT  A post acute care recommendation was made by your care team for [facility] 76  Discussed Freedom of Choice with patient  List of agencies given to patient via in person  patient aware the list is custom filtered for them by preference  and that [facility] post acute providers are designated

## 2020-01-14 ENCOUNTER — APPOINTMENT (INPATIENT)
Dept: RADIOLOGY | Facility: HOSPITAL | Age: 65
DRG: 853 | End: 2020-01-14
Attending: INTERNAL MEDICINE
Payer: MEDICARE

## 2020-01-14 ENCOUNTER — ANESTHESIA (INPATIENT)
Dept: NON INVASIVE DIAGNOSTICS | Facility: HOSPITAL | Age: 65
DRG: 853 | End: 2020-01-14
Payer: MEDICARE

## 2020-01-14 ENCOUNTER — APPOINTMENT (INPATIENT)
Dept: NON INVASIVE DIAGNOSTICS | Facility: HOSPITAL | Age: 65
DRG: 853 | End: 2020-01-14
Attending: INTERNAL MEDICINE
Payer: MEDICARE

## 2020-01-14 DIAGNOSIS — R78.81 BACTEREMIA DUE TO GRAM-POSITIVE BACTERIA: Primary | ICD-10-CM

## 2020-01-14 LAB
ANION GAP SERPL CALCULATED.3IONS-SCNC: 11 MMOL/L (ref 4–13)
BACTERIA BLD CULT: NORMAL
BACTERIA BLD CULT: NORMAL
BUN SERPL-MCNC: 86 MG/DL (ref 5–25)
CALCIUM SERPL-MCNC: 7.9 MG/DL (ref 8.3–10.1)
CHLORIDE SERPL-SCNC: 97 MMOL/L (ref 100–108)
CO2 SERPL-SCNC: 22 MMOL/L (ref 21–32)
CREAT SERPL-MCNC: 2.15 MG/DL (ref 0.6–1.3)
ERYTHROCYTE [DISTWIDTH] IN BLOOD BY AUTOMATED COUNT: 13.8 % (ref 11.6–15.1)
GFR SERPL CREATININE-BSD FRML MDRD: 31 ML/MIN/1.73SQ M
GLUCOSE SERPL-MCNC: 152 MG/DL (ref 65–140)
GLUCOSE SERPL-MCNC: 157 MG/DL (ref 65–140)
GLUCOSE SERPL-MCNC: 180 MG/DL (ref 65–140)
GLUCOSE SERPL-MCNC: 207 MG/DL (ref 65–140)
GLUCOSE SERPL-MCNC: 237 MG/DL (ref 65–140)
HCT VFR BLD AUTO: 22.4 % (ref 36.5–49.3)
HGB BLD-MCNC: 7.4 G/DL (ref 12–17)
MCH RBC QN AUTO: 30.2 PG (ref 26.8–34.3)
MCHC RBC AUTO-ENTMCNC: 33 G/DL (ref 31.4–37.4)
MCV RBC AUTO: 91 FL (ref 82–98)
PLATELET # BLD AUTO: 426 THOUSANDS/UL (ref 149–390)
PMV BLD AUTO: 9.5 FL (ref 8.9–12.7)
POTASSIUM SERPL-SCNC: 3.7 MMOL/L (ref 3.5–5.3)
RBC # BLD AUTO: 2.45 MILLION/UL (ref 3.88–5.62)
SODIUM SERPL-SCNC: 130 MMOL/L (ref 136–145)
WBC # BLD AUTO: 15.69 THOUSAND/UL (ref 4.31–10.16)

## 2020-01-14 PROCEDURE — 82948 REAGENT STRIP/BLOOD GLUCOSE: CPT

## 2020-01-14 PROCEDURE — 80048 BASIC METABOLIC PNL TOTAL CA: CPT | Performed by: INTERNAL MEDICINE

## 2020-01-14 PROCEDURE — 99232 SBSQ HOSP IP/OBS MODERATE 35: CPT | Performed by: INTERNAL MEDICINE

## 2020-01-14 PROCEDURE — 93925 LOWER EXTREMITY STUDY: CPT | Performed by: SURGERY

## 2020-01-14 PROCEDURE — 94660 CPAP INITIATION&MGMT: CPT

## 2020-01-14 PROCEDURE — 93922 UPR/L XTREMITY ART 2 LEVELS: CPT | Performed by: SURGERY

## 2020-01-14 PROCEDURE — 76000 FLUOROSCOPY <1 HR PHYS/QHP: CPT | Performed by: RADIOLOGY

## 2020-01-14 PROCEDURE — 99233 SBSQ HOSP IP/OBS HIGH 50: CPT | Performed by: INTERNAL MEDICINE

## 2020-01-14 PROCEDURE — 76000 FLUOROSCOPY <1 HR PHYS/QHP: CPT

## 2020-01-14 PROCEDURE — 94760 N-INVAS EAR/PLS OXIMETRY 1: CPT

## 2020-01-14 PROCEDURE — 85027 COMPLETE CBC AUTOMATED: CPT | Performed by: STUDENT IN AN ORGANIZED HEALTH CARE EDUCATION/TRAINING PROGRAM

## 2020-01-14 RX ORDER — ACETAMINOPHEN 325 MG/1
650 TABLET ORAL EVERY 6 HOURS PRN
Status: DISCONTINUED | OUTPATIENT
Start: 2020-01-14 | End: 2020-01-29 | Stop reason: HOSPADM

## 2020-01-14 RX ORDER — LORAZEPAM 0.5 MG/1
0.5 TABLET ORAL EVERY 8 HOURS PRN
Status: DISCONTINUED | OUTPATIENT
Start: 2020-01-14 | End: 2020-01-29 | Stop reason: HOSPADM

## 2020-01-14 RX ORDER — INSULIN GLARGINE 100 [IU]/ML
22 INJECTION, SOLUTION SUBCUTANEOUS
Status: DISCONTINUED | OUTPATIENT
Start: 2020-01-14 | End: 2020-01-29 | Stop reason: HOSPADM

## 2020-01-14 RX ORDER — FUROSEMIDE 40 MG/1
40 TABLET ORAL
Status: DISCONTINUED | OUTPATIENT
Start: 2020-01-15 | End: 2020-01-15

## 2020-01-14 RX ORDER — FUROSEMIDE 10 MG/ML
40 INJECTION INTRAMUSCULAR; INTRAVENOUS
Status: DISCONTINUED | OUTPATIENT
Start: 2020-01-14 | End: 2020-01-14

## 2020-01-14 RX ORDER — FUROSEMIDE 10 MG/ML
40 INJECTION INTRAMUSCULAR; INTRAVENOUS
Status: COMPLETED | OUTPATIENT
Start: 2020-01-14 | End: 2020-01-14

## 2020-01-14 RX ADMIN — INSULIN LISPRO 2 UNITS: 100 INJECTION, SOLUTION INTRAVENOUS; SUBCUTANEOUS at 09:50

## 2020-01-14 RX ADMIN — GUAIFENESIN 600 MG: 600 TABLET ORAL at 21:08

## 2020-01-14 RX ADMIN — INSULIN LISPRO 2 UNITS: 100 INJECTION, SOLUTION INTRAVENOUS; SUBCUTANEOUS at 13:09

## 2020-01-14 RX ADMIN — METOPROLOL TARTRATE 50 MG: 50 TABLET, FILM COATED ORAL at 09:48

## 2020-01-14 RX ADMIN — CEFAZOLIN SODIUM 2000 MG: 2 SOLUTION INTRAVENOUS at 18:11

## 2020-01-14 RX ADMIN — LORAZEPAM 0.5 MG: 1 TABLET ORAL at 13:07

## 2020-01-14 RX ADMIN — TAMSULOSIN HYDROCHLORIDE 0.4 MG: 0.4 CAPSULE ORAL at 17:16

## 2020-01-14 RX ADMIN — ACETAMINOPHEN 650 MG: 325 TABLET ORAL at 13:07

## 2020-01-14 RX ADMIN — MELATONIN TAB 3 MG 6 MG: 3 TAB at 21:07

## 2020-01-14 RX ADMIN — FUROSEMIDE 40 MG: 10 INJECTION, SOLUTION INTRAMUSCULAR; INTRAVENOUS at 00:15

## 2020-01-14 RX ADMIN — METOPROLOL TARTRATE 50 MG: 50 TABLET, FILM COATED ORAL at 21:08

## 2020-01-14 RX ADMIN — PRAVASTATIN SODIUM 40 MG: 40 TABLET ORAL at 17:17

## 2020-01-14 RX ADMIN — GUAIFENESIN 600 MG: 600 TABLET ORAL at 09:49

## 2020-01-14 RX ADMIN — AMLODIPINE BESYLATE 10 MG: 10 TABLET ORAL at 09:49

## 2020-01-14 RX ADMIN — FUROSEMIDE 40 MG: 10 INJECTION, SOLUTION INTRAMUSCULAR; INTRAVENOUS at 09:52

## 2020-01-14 RX ADMIN — CEFAZOLIN SODIUM 2000 MG: 2 SOLUTION INTRAVENOUS at 03:10

## 2020-01-14 RX ADMIN — INSULIN LISPRO 4 UNITS: 100 INJECTION, SOLUTION INTRAVENOUS; SUBCUTANEOUS at 17:17

## 2020-01-14 RX ADMIN — FUROSEMIDE 40 MG: 10 INJECTION, SOLUTION INTRAMUSCULAR; INTRAVENOUS at 17:17

## 2020-01-14 RX ADMIN — ALLOPURINOL 300 MG: 100 TABLET ORAL at 09:49

## 2020-01-14 RX ADMIN — CEFAZOLIN SODIUM 2000 MG: 2 SOLUTION INTRAVENOUS at 11:10

## 2020-01-14 RX ADMIN — INSULIN GLARGINE 22 UNITS: 100 INJECTION, SOLUTION SUBCUTANEOUS at 21:09

## 2020-01-14 NOTE — PROGRESS NOTES
Progress Note - Podiatry  Edgardo Arce 75 y o  male MRN: [id number]  Unit/Bed#: E4 -01 Encounter: [id number]    Assessment:  1  S/p I&D with removal of sesamoid on 01/12/20 and s/p left hallux amputation on 01/08/20 due to underlying clinical OM, abscess, and possible infectious tenosynovitis   2  Sepsis - POA  3  Bacteremia likely secondary to #1  4  DM type 2    Plan:  - Left hallux amputation incision appears stable without any acute signs of infection  Will continue to monitor   - Recommend holding Eliquis until at least tomorrow 1/15   - ELBERT scheduled for 1/15  C/w IV abx per ID   - LEADs: R 0 96/180/45  L nc/not-obt/69 (3rd toe), no focal stenosis  - Rest of care per primary team    Subjective/Objective   Chief Complaint:   Chief Complaint   Patient presents with    Fatigue     Pt with multiple complaints: sore throat, diarrhea, fatigue, fevers, tylenol at 0930  -ill contacts  Subjective: 75 y o  y/o male was seen and evaluated at bedside with Dr Irina ARMSTRONG, non-toxic in appearance  No N/V/F/C/SOB/CP,     Blood pressure 156/68, pulse 60, temperature 98 6 °F (37 °C), temperature source Temporal, resp  rate 20, weight 102 kg (223 lb 12 8 oz), SpO2 97 %  ,Body mass index is 32 4 kg/m²  Invasive Devices     Peripherally Inserted Central Catheter Line            PICC Line 84/18/49 Right Basilic less than 1 day          Peripheral Intravenous Line            Peripheral IV 01/12/20 Right Forearm 2 days                Physical Exam:   General: Alert, cooperative and no distress  Lungs: Non labored breathing  Heart: Positive S1, S2  Abdomen: Soft, non-tender  Extremity: msk and nvs grossly baseline  LLE: Dressing is c/d/i without strike through  Incision is well coapted with sutures intact  No active drainage  No actue signs of infection; no ascending cellulitis, no purulence noted  Mild surrounding erythema note        Left foot      Lab, Imaging and other studies:   CBC:   Lab Results Component Value Date    WBC 15 69 (H) 01/14/2020    HGB 7 4 (L) 01/14/2020    HCT 22 4 (L) 01/14/2020    MCV 91 01/14/2020     (H) 01/14/2020    MCH 30 2 01/14/2020    MCHC 33 0 01/14/2020    RDW 13 8 01/14/2020    MPV 9 5 01/14/2020   , CMP:   Lab Results   Component Value Date    SODIUM 130 (L) 01/14/2020    K 3 7 01/14/2020    CL 97 (L) 01/14/2020    CO2 22 01/14/2020    BUN 86 (H) 01/14/2020    CREATININE 2 15 (H) 01/14/2020    CALCIUM 7 9 (L) 01/14/2020    EGFR 31 01/14/2020

## 2020-01-14 NOTE — PROGRESS NOTES
Progress Note - Infectious Disease   Shira Sanders 75 y o  male MRN: [id number]  Unit/Bed#: E4 -01 Encounter: [id number]      Impression/Plan:  1  Sepsis   POA   Fever and leukocytosis   Likely secondary to MSSA bacteremia with left hallux wound and underlying osteopmyelitis as presumed source  No other clear source appreciated  Despite being systemically ill he has been hemodynamically stable  Today he is afebrile and his WBC count continues trending down  Repeat blood cultures are negative after four days  TTE was negative but he will complete a ELBERT later today for guidance on treatment duration   -antibiotic as below  -monitor CBC and BMP  -follow up repeat blood cultures  -check ELBERT  -monitor vitals  -supportive care     2  Shital Lilian in both sets of patient's initial blood cultures  Suspect patient's left great toe ulcer was source   Patient does have an implanted pacemaker  TTE was negative but he will complete a ELBERT later today to establish duration of antibiotic treatment  Repeat blood cultures are clear after four days  Patient is currently receiving IV cefazolin and is tolerating without difficulty    -continue IV cefazolin, 2g q8 hours  -monitor CBC and BMP  -follow up repeat blood cultures  -check ELBERT  -monitor vitals     3  Left hallux ulceration   With osteomyelitis as his wound does probe to bone and an x-ray of the left foot was suggestive of bony erosion at the 1st proximal phalanx  Purulence noted during initial podiatry wound exam, concern for possible infectious tenosynovitis   I suspect this was the source of patient's bacteremia above  Nobie Min is now status post amputation of hallux for presumed surgical cure of the ulcer and osteomyelitis  Intraop wound cultures showed MSSA   He had additional I&D and removal and sesamoid bone on 1/12/2020    -serial left foot exams  -follow up intraop wound cultures  -local wound care per Podiatry  -continue close follow-up with Podiatry     4  Acute hypoxic respiratory failure   Suspect this is secondary to pulmonary edema and diastolic heart failure   BNP and troponin were elevated upon admission  Chest x-ray and CT of the chest did not show consolidations indicative of a bacterial process  Patient did initially require high-flow O2  He was weaned down to nasal cannula and is now stable on room air  He is following closely with Cardiology and is receiving diuresis     -diuresis per Cardiology  -monitor vitals  -monitor respiratory status  -continue close follow-up with Cardiology     5  Type 2 diabetes mellitus with neuropathy   Patient's last hemoglobin A1c was 6 4% on 01/07/2020   This is risk factor for wounds and infection   Recommend tight glycemic control for overall health, especially in the setting of wound infection   -blood glucose management per primary service     6  Chronic diastolic heart failure   In setting of aortic and mitral valve stenosis and complete heart block   He has a [employer] dual chamber pacemaker in place   Now with Staph aureus bacteremia  TTE was negative but he should pursue ELBERT for further assessment for endocarditis/pacer infection  He is following closely with Cardiology   -check ELBERT  -continue follow-up with Cardiology     7  Paroxysmal AFib   On Eliquis  Above plan was discussed in detail with patient at the bedside  Antibiotics:  Cefazolin 5  Antibiotics 8    Subjective:  Patient reports he is feeling well today  He is not having any pain in his left foot  He is not having any pain or problems at the site of his new right upper arm PICC  He denies fever, chills, sweats, shakes; no nausea, vomiting, abdominal pain, diarrhea, or dysuria; no cough, shortness of breath, or chest pain  No new symptoms      Objective:  Vitals:  Temp:  [98 4 °F (36 9 °C)-99 9 °F (37 7 °C)] 98 4 °F (36 9 °C)  HR:  [59-61] 59  Resp:  [20] 20  BP: (112-156)/(56-71) 141/71  SpO2:  [86 %-97 %] 95 %  Temp (24hrs), Av 9 °F (37 2 °C), Min:98 4 °F (36 9 °C), Max:99 9 °F (37 7 °C)  Current: Temperature: 98 4 °F (36 9 °C)    Physical Exam:   General Appearance:  Alert, interactive, nontoxic, no acute distress  Patient is wearing his glasses  Throat: Oropharynx moist without lesions  Lungs:   Clear to auscultation bilaterally; no wheezes, rhonchi or rales; respirations unlabored; patient is on room air   Heart:  RRR; +murmur, no rub or gallop   Abdomen:   Soft, non-tender, non-distended, positive bowel sounds  Extremities: No clubbing or cyanosis, no edema; left foot dressing remains intact, is clean and dry without breakthrough drainage, no spreading erythema up the left distal leg; right upper arm PICC intact, no leakage or spreading erythema at site   Skin: No new rashes, lesions, or draining wounds noted on exposed skin  Labs, Imaging, & Other studies:   All pertinent labs and imaging studies were personally reviewed  Results from last 7 days   Lab Units 20  0444 20  0634 20  1026   WBC Thousand/uL 15 69* 15 34* 13 39*   HEMOGLOBIN g/dL 7 4* 7 5* 8 2*   PLATELETS Thousands/uL 426* 372 348     Results from last 7 days   Lab Units 20  0444  20  1207   POTASSIUM mmol/L 3 7   < > 4 3   CHLORIDE mmol/L 97*   < > 96*   CO2 mmol/L 22   < > 21   BUN mg/dL 86*   < > 51*   CREATININE mg/dL 2 15*   < > 2 08*   EGFR ml/min/1 73sq m 31   < > 32   CALCIUM mg/dL 7 9*   < > 8 7   AST U/L  --   --  24   ALT U/L  --   --  24   ALK PHOS U/L  --   --  83    < > = values in this interval not displayed  Results from last 7 days   Lab Units 20  0413 20  0411 20  1808 20  1759 20  0110 20  1329   BLOOD CULTURE  No Growth After 5 Days  No Growth After 5 Days    --   --   --  Staphylococcus aureus*  Staphylococcus aureus*   GRAM STAIN RESULT   --   --  Rare Polys*  Rare Gram positive cocci in pairs* 1+ Disintegrating polys*  1+ Gram positive cocci in pairs*  1+ Gram positive cocci in clusters*  --  Gram positive cocci in clusters*  Gram positive cocci in clusters*   LEGIONELLA URINARY ANTIGEN   --   --   --   --  Negative  --      Results from last 7 days   Lab Units 01/08/20  0602 01/07/20  1329   PROCALCITONIN ng/ml 1 88* 0 67*

## 2020-01-14 NOTE — PLAN OF CARE
Problem: Potential for Falls  Goal: Patient will remain free of falls  Description  INTERVENTIONS:  - Assess patient frequently for physical needs  -  Identify cognitive and physical deficits and behaviors that affect risk of falls    -  [place] fall precautions as indicated by assessment   - Educate patient/family on patient safety including physical limitations  - Instruct patient to call for assistance with activity based on assessment  - Modify environment to reduce risk of injury  - Consider OT/PT consult to assist with strengthening/mobility  Outcome: Progressing     Problem: PAIN - ADULT  Goal: Verbalizes/displays adequate comfort level or baseline comfort level  Description  Interventions:  - Encourage patient to monitor pain and request assistance  - Assess pain using appropriate pain scale  - Administer analgesics based on type and severity of pain and evaluate response  - Implement non-pharmacological measures as appropriate and evaluate response  - Consider cultural and social influences on pain and pain management  - Notify physician/advanced practitioner if interventions unsuccessful or patient reports new pain  Outcome: Progressing     Problem: INFECTION - ADULT  Goal: Absence or prevention of progression during hospitalization  Description  INTERVENTIONS:  - Assess and monitor for signs and symptoms of infection  - Monitor lab/diagnostic results  - Monitor all insertion sites, i e  indwelling lines, tubes, and drains  - Administer medications as ordered  - Instruct and encourage patient and family to use good hand hygiene technique  - Identify and instruct in appropriate isolation precautions for identified infection/condition   Outcome: Progressing     Problem: SAFETY ADULT  Goal: Maintain or return to baseline ADL function  Description  INTERVENTIONS:  -  Assess patient's ability to carry out ADLs; assess patient's baseline for ADL function and identify physical deficits which impact ability to perform ADLs (bathing, care of mouth/teeth, toileting, grooming, dressing, etc )  - Assess/evaluate cause of self-care deficits   - Assess range of motion  - Assess patient's mobility; develop plan if impaired  - Assess patient's need for assistive devices and provide as appropriate  - Encourage maximum independence but intervene and supervise when necessary  - Involve family in performance of ADLs  - Assess for home care needs following discharge   - Consider OT consult to assist with ADL evaluation and planning for discharge  - Provide patient education as appropriate  Outcome: Progressing  Goal: Maintain or return mobility status to optimal level  Description  INTERVENTIONS:  - Assess patient's baseline mobility status (ambulation, transfers, stairs, etc )    - Identify cognitive and physical deficits and behaviors that affect mobility  - Identify mobility aids required to assist with transfers and/or ambulation (gait belt, sit-to-stand, lift, walker, cane, etc )  - [place] fall precautions as indicated by assessment  - Record patient progress and toleration of activity level on Mobility SBAR; progress patient to next Phase/Stage  - Instruct patient to call for assistance with activity based on assessment  - Consider rehabilitation consult to assist with strengthening/weightbearing, etc   Outcome: Progressing     Problem: DISCHARGE PLANNING  Goal: Discharge to home or other facility with appropriate resources  Description  INTERVENTIONS:  - Identify barriers to discharge w/patient and caregiver  - Arrange for needed discharge resources and transportation as appropriate  - Identify discharge learning needs (meds, wound care, etc )  - Arrange for interpretive services to assist at discharge as needed  - Refer to Case Management Department for coordinating discharge planning if the patient needs post-hospital services based on physician/advanced practitioner order or complex needs related to functional status, cognitive ability, or social support system  Outcome: Progressing     Problem: Knowledge Deficit  Goal: Patient/family/caregiver demonstrates understanding of disease process, treatment plan, medications, and discharge instructions  Description  Complete learning assessment and assess knowledge base    Interventions:  - Provide teaching at level of understanding  - Provide teaching via preferred learning methods  Outcome: Progressing     Problem: CARDIOVASCULAR - ADULT  Goal: Maintains optimal cardiac output and hemodynamic stability  Description  INTERVENTIONS:  - Monitor I/O, vital signs and rhythm  - Monitor for S/S and trends of decreased cardiac output  - Administer and titrate ordered vasoactive medications to optimize hemodynamic stability  - Assess quality of pulses, skin color and temperature  - Assess for signs of decreased coronary artery perfusion  - Instruct patient to report change in severity of symptoms  Outcome: Progressing  Goal: Absence of cardiac dysrhythmias or at baseline rhythm  Description  INTERVENTIONS:  - Continuous cardiac monitoring, vital signs, obtain 12 lead EKG if ordered  - Administer antiarrhythmic and heart rate control medications as ordered  - Monitor electrolytes and administer replacement therapy as ordered  Outcome: Progressing     Problem: RESPIRATORY - ADULT  Goal: Achieves optimal ventilation and oxygenation  Description  INTERVENTIONS:  - Assess for changes in respiratory status  - Assess for changes in mentation and behavior  - Position to facilitate oxygenation and minimize respiratory effort  - Oxygen administered by appropriate delivery if ordered  - Initiate smoking cessation education as indicated  - Encourage broncho-pulmonary hygiene including cough, deep breathe, Incentive Spirometry  - Assess the need for suctioning and aspirate as needed  - Assess and instruct to report SOB or any respiratory difficulty  - Respiratory Therapy support as indicated  Outcome: Progressing     Problem: Prexisting or High Potential for Compromised Skin Integrity  Goal: Skin integrity is maintained or improved  Description  INTERVENTIONS:  - Identify patients at risk for skin breakdown  - Assess and monitor skin integrity  - Assess and monitor nutrition and hydration status  - Monitor labs   - Assess for incontinence   - Turn and reposition patient  - Assist with mobility/ambulation  - Relieve pressure over bony prominences  - Avoid friction and shearing  - Provide appropriate hygiene as needed including keeping skin clean and dry  - Evaluate need for skin moisturizer/barrier cream  - Collaborate with interdisciplinary team   - Patient/family teaching  - Consider wound care consult   Outcome: Progressing     Problem: Nutrition/Hydration-ADULT  Goal: Nutrient/Hydration intake appropriate for improving, restoring or maintaining nutritional needs  Description  Monitor and assess patient's nutrition/hydration status for malnutrition  Collaborate with interdisciplinary team and initiate plan and interventions as ordered  Monitor patient's weight and dietary intake as ordered or per policy  Utilize nutrition screening tool and intervene as necessary  Determine patient's food preferences and provide high-protein, high-caloric foods as appropriate       INTERVENTIONS:  - Monitor oral intake, urinary output, labs, and treatment plans  - Assess nutrition and hydration status and recommend course of action  - Evaluate amount of meals eaten  - Assist patient with eating if necessary   - Allow adequate time for meals  - Recommend/ encourage appropriate diets, oral nutritional supplements, and vitamin/mineral supplements  - Order, calculate, and assess calorie counts as needed  - Recommend, monitor, and adjust tube feedings and TPN/PPN based on assessed needs  - Assess need for intravenous fluids  - Provide specific nutrition/hydration education as appropriate  - Include patient/family/caregiver in decisions related to nutrition  Outcome: Progressing

## 2020-01-14 NOTE — PHYSICAL THERAPY NOTE
Physical Therapy Cancellation Note        Pt unavailable for PTtreatment interventions as pt is currently off floor in IR for procedure  Will attempt at a later time    Sondra Rogers, PTA

## 2020-01-14 NOTE — PLAN OF CARE
Problem: Potential for Falls  Goal: Patient will remain free of falls  Description  INTERVENTIONS:  - Assess patient frequently for physical needs  -  Identify cognitive and physical deficits and behaviors that affect risk of falls    -  [place] fall precautions as indicated by assessment   - Educate patient/family on patient safety including physical limitations  - Instruct patient to call for assistance with activity based on assessment  - Modify environment to reduce risk of injury  - Consider OT/PT consult to assist with strengthening/mobility  Outcome: Progressing     Problem: PAIN - ADULT  Goal: Verbalizes/displays adequate comfort level or baseline comfort level  Description  Interventions:  - Encourage patient to monitor pain and request assistance  - Assess pain using appropriate pain scale  - Administer analgesics based on type and severity of pain and evaluate response  - Implement non-pharmacological measures as appropriate and evaluate response  - Consider cultural and social influences on pain and pain management  - Notify physician/advanced practitioner if interventions unsuccessful or patient reports new pain  Outcome: Progressing     Problem: INFECTION - ADULT  Goal: Absence or prevention of progression during hospitalization  Description  INTERVENTIONS:  - Assess and monitor for signs and symptoms of infection  - Monitor lab/diagnostic results  - Monitor all insertion sites, i e  indwelling lines, tubes, and drains  - Administer medications as ordered  - Instruct and encourage patient and family to use good hand hygiene technique  - Identify and instruct in appropriate isolation precautions for identified infection/condition   Outcome: Progressing     Problem: SAFETY ADULT  Goal: Maintain or return to baseline ADL function  Description  INTERVENTIONS:  -  Assess patient's ability to carry out ADLs; assess patient's baseline for ADL function and identify physical deficits which impact ability to perform ADLs (bathing, care of mouth/teeth, toileting, grooming, dressing, etc )  - Assess/evaluate cause of self-care deficits   - Assess range of motion  - Assess patient's mobility; develop plan if impaired  - Assess patient's need for assistive devices and provide as appropriate  - Encourage maximum independence but intervene and supervise when necessary  - Involve family in performance of ADLs  - Assess for home care needs following discharge   - Consider OT consult to assist with ADL evaluation and planning for discharge  - Provide patient education as appropriate  Outcome: Progressing  Goal: Maintain or return mobility status to optimal level  Description  INTERVENTIONS:  - Assess patient's baseline mobility status (ambulation, transfers, stairs, etc )    - Identify cognitive and physical deficits and behaviors that affect mobility  - Identify mobility aids required to assist with transfers and/or ambulation (gait belt, sit-to-stand, lift, walker, cane, etc )  - [place] fall precautions as indicated by assessment  - Record patient progress and toleration of activity level on Mobility SBAR; progress patient to next Phase/Stage  - Instruct patient to call for assistance with activity based on assessment  - Consider rehabilitation consult to assist with strengthening/weightbearing, etc   Outcome: Progressing     Problem: DISCHARGE PLANNING  Goal: Discharge to home or other facility with appropriate resources  Description  INTERVENTIONS:  - Identify barriers to discharge w/patient and caregiver  - Arrange for needed discharge resources and transportation as appropriate  - Identify discharge learning needs (meds, wound care, etc )  - Arrange for interpretive services to assist at discharge as needed  - Refer to Case Management Department for coordinating discharge planning if the patient needs post-hospital services based on physician/advanced practitioner order or complex needs related to functional status, cognitive ability, or social support system  Outcome: Progressing     Problem: Knowledge Deficit  Goal: Patient/family/caregiver demonstrates understanding of disease process, treatment plan, medications, and discharge instructions  Description  Complete learning assessment and assess knowledge base    Interventions:  - Provide teaching at level of understanding  - Provide teaching via preferred learning methods  Outcome: Progressing     Problem: CARDIOVASCULAR - ADULT  Goal: Maintains optimal cardiac output and hemodynamic stability  Description  INTERVENTIONS:  - Monitor I/O, vital signs and rhythm  - Monitor for S/S and trends of decreased cardiac output  - Administer and titrate ordered vasoactive medications to optimize hemodynamic stability  - Assess quality of pulses, skin color and temperature  - Assess for signs of decreased coronary artery perfusion  - Instruct patient to report change in severity of symptoms  Outcome: Progressing  Goal: Absence of cardiac dysrhythmias or at baseline rhythm  Description  INTERVENTIONS:  - Continuous cardiac monitoring, vital signs, obtain 12 lead EKG if ordered  - Administer antiarrhythmic and heart rate control medications as ordered  - Monitor electrolytes and administer replacement therapy as ordered  Outcome: Progressing     Problem: RESPIRATORY - ADULT  Goal: Achieves optimal ventilation and oxygenation  Description  INTERVENTIONS:  - Assess for changes in respiratory status  - Assess for changes in mentation and behavior  - Position to facilitate oxygenation and minimize respiratory effort  - Oxygen administered by appropriate delivery if ordered  - Initiate smoking cessation education as indicated  - Encourage broncho-pulmonary hygiene including cough, deep breathe, Incentive Spirometry  - Assess the need for suctioning and aspirate as needed  - Assess and instruct to report SOB or any respiratory difficulty  - Respiratory Therapy support as indicated  Outcome: Progressing     Problem: Prexisting or High Potential for Compromised Skin Integrity  Goal: Skin integrity is maintained or improved  Description  INTERVENTIONS:  - Identify patients at risk for skin breakdown  - Assess and monitor skin integrity  - Assess and monitor nutrition and hydration status  - Monitor labs   - Assess for incontinence   - Turn and reposition patient  - Assist with mobility/ambulation  - Relieve pressure over bony prominences  - Avoid friction and shearing  - Provide appropriate hygiene as needed including keeping skin clean and dry  - Evaluate need for skin moisturizer/barrier cream  - Collaborate with interdisciplinary team   - Patient/family teaching  - Consider wound care consult   Outcome: Progressing     Problem: Nutrition/Hydration-ADULT  Goal: Nutrient/Hydration intake appropriate for improving, restoring or maintaining nutritional needs  Description  Monitor and assess patient's nutrition/hydration status for malnutrition  Collaborate with interdisciplinary team and initiate plan and interventions as ordered  Monitor patient's weight and dietary intake as ordered or per policy  Utilize nutrition screening tool and intervene as necessary  Determine patient's food preferences and provide high-protein, high-caloric foods as appropriate       INTERVENTIONS:  - Monitor oral intake, urinary output, labs, and treatment plans  - Assess nutrition and hydration status and recommend course of action  - Evaluate amount of meals eaten  - Assist patient with eating if necessary   - Allow adequate time for meals  - Recommend/ encourage appropriate diets, oral nutritional supplements, and vitamin/mineral supplements  - Order, calculate, and assess calorie counts as needed  - Recommend, monitor, and adjust tube feedings and TPN/PPN based on assessed needs  - Assess need for intravenous fluids  - Provide specific nutrition/hydration education as appropriate  - Include patient/family/caregiver in decisions related to nutrition  Outcome: Progressing

## 2020-01-14 NOTE — PROGRESS NOTES
Progress Note - Cardiology   Kee Yepez 75 y o  male MRN: [id number]  Unit/Bed#: E4 -01 Encounter: [id number]        Problem List:  Principal Problem:    Acute respiratory failure with hypoxia ([address] )  Active Problems:    Eczema    Complete heart block (HCC)    PAF (paroxysmal atrial fibrillation) (HCC)    NICOLASA (obstructive sleep apnea)    Type 2 diabetes mellitus with chronic kidney disease, without long-term current use of insulin (HCC)    Essential hypertension    Acute renal failure superimposed on stage 3 chronic kidney disease (HCC)    Leukocytosis    Elevated troponin I level    Bacteremia due to Gram-positive bacteria    Type 2 diabetes mellitus with diabetic neuropathy, without long-term current use of insulin (HCC)    Pyogenic inflammation of bone (HCC)    Chronic diastolic (congestive) heart failure (HCC)      Asessment:  1  Acute hypoxic respiratory failure:              -primary reason for admission              -suspected PNA +/-mild CHF  2  Chronic diastolic congestive heart failure:              -baseline weight:  215 lb (as OP 12/3/19)              -OP diuretic:  Lasix 40 b i d   3  Moderate aortic and mitral stenosis on echo 05/19  4  Complete heart block, s/p [employer] dual-chamber PPM  5  Hypertension   6  Dyslipidemia  7  Acute on chronic kidney injury:              -creatinine 2 08 on admission              -as low as 1 1 July of 2019  8  Elevated troponin, 0 22 on admission  9  Type 2 diabetes mellitus  10  Paroxysmal atrial fibrillation              -stroke prophylaxis:  Eliquis              -rate control:  Lopressor 50 b i d   11  Hypoalbuminemia, 2 6  12  Acute osteomyelitis of left foot      Plan/ Discussion:  1  One more dose IV lasix this afternoon, then return to PO Lasix tomorrow  Lungs fairly clear bilaterally and creatinine slowly rising 1 9 --> 2 01 --> 2 15 today  He is still short of breath but hemoglobin is low at 7 4, wonder if he is symptomatic from this    2  Unfortunately unable to perform ELBERT today due to scheduling conflicts, will plan for tomorrow  NPO after midnight tonight  Okay for diet today    3  Eliquis remains on hold would restart when okay with podiatry  He is now fairly anemic and would wait until stability an H&H is ensured prior to restarting anticoagulation  4  Blood pressure and heart rate remain well controlled on amlodipine and metoprolol    Discussed with Podiatry      Subjective:  Is short of breath today in frustrated at the length of his hospital stay  No pain      Vitals:  Vitals:    01/13/20 0600 01/14/20 0545   Weight: 98 9 kg (218 lb 0 6 oz) 102 kg (223 lb 12 8 oz)   ,  Vitals:    01/14/20 0300 01/14/20 0330 01/14/20 0545 01/14/20 0700   BP: 133/66   156/68   BP Location: Left arm   Left arm   Pulse: 61   60   Resp: 20   20   Temp: 99 2 °F (37 3 °C)   98 6 °F (37 °C)   TempSrc: Temporal   Temporal   SpO2: 97% 96%  97%   Weight:   102 kg (223 lb 12 8 oz)        Exam:  General:  Alert awake and oriented, no acute distress   Heart:  Regular by physical exam, no murmurs  Respiratory effort:  Breathing comfortably on 2 L nasal cannula    Lungs:  Clear bilaterally   Lower Limbs:  No edema, left lower extremity remains dressed and wrapped             Medications:    Current Facility-Administered Medications:     acetaminophen (TYLENOL) tablet 650 mg, 650 mg, Oral, Q6H PRN, Fátima Lulu, DPM, 650 mg at 01/08/20 2308    allopurinol (ZYLOPRIM) tablet 300 mg, 300 mg, Oral, QAM, Fátima Lulu, DPM, 300 mg at 01/14/20 0949    amLODIPine (NORVASC) tablet 10 mg, 10 mg, Oral, Daily, Fátima Lulu, DPM, 10 mg at 01/14/20 0949    ceFAZolin (ANCEF) IVPB (premix) 2,000 mg, 2,000 mg, Intravenous, Q8H, Fátima Lulu, DPM, Last Rate: 100 mL/hr at 01/14/20 0310, 2,000 mg at 01/14/20 0310    furosemide (LASIX) injection 40 mg, 40 mg, Intravenous, Q8H, Jamil Bai MD, 40 mg at 01/14/20 0952    guaiFENesin (MUCINEX) 12 hr tablet 600 mg, 600 mg, Oral, BID, Clista Bologna, DPM, 600 mg at 01/14/20 0949    insulin glargine (LANTUS) subcutaneous injection 20 Units 0 2 mL, 20 Units, Subcutaneous, HS, Melissa Ambron, DO, 20 Units at 01/13/20 2130    insulin lispro (HumaLOG) 100 units/mL subcutaneous injection 2-12 Units, 2-12 Units, Subcutaneous, TID AC, 2 Units at 01/14/20 0950 **AND** Fingerstick Glucose (POCT), , , TID AC, Clista Bologna, DPM    ipratropium-albuterol (DUO-NEB) 0 5-2 5 mg/3 mL inhalation solution 3 mL, 3 mL, Nebulization, Q6H PRN, Clista Bologna, DPM, 3 mL at 01/07/20 2025    melatonin tablet 6 mg, 6 mg, Oral, HS, ista Bolkaylenea, DPM, 6 mg at 01/13/20 2130    metoprolol tartrate (LOPRESSOR) tablet 50 mg, 50 mg, Oral, Q12H [facility], [facility], DPM, 50 mg at 01/14/20 0948    oxyCODONE (ROXICODONE) IR tablet 5 mg, 5 mg, Oral, Q4H PRN, [facility], DPM, 5 mg at 01/12/20 1709    pravastatin (PRAVACHOL) tablet 40 mg, 40 mg, Oral, Daily With Victoriano Sanchez, DPM, 40 mg at 01/13/20 1639    tamsulosin (FLOMAX) capsule 0 4 mg, 0 4 mg, Oral, Daily With Victoriano Sanchez, DPM, 0 4 mg at 01/13/20 1639      Labs/Data:        Results from last 7 days   Lab Units 01/14/20  0444 01/13/20  0634 01/12/20  1026   WBC Thousand/uL 15 69* 15 34* 13 39*   HEMOGLOBIN g/dL 7 4* 7 5* 8 2*   HEMATOCRIT % 22 4* 22 7* 24 7*   PLATELETS Thousands/uL 426* 372 348     Results from last 7 days   Lab Units 01/14/20  0444 01/13/20  0634 01/12/20  0450  01/08/20  0602 01/07/20  2135 01/07/20  1652   POTASSIUM mmol/L 3 7 3 8 3 6   < > 4 4  --   --    CHLORIDE mmol/L 97* 97* 97*   < > 97*  --   --    CO2 mmol/L 22 22 22   < > 22  --   --    BUN mg/dL 86* 81* 81*   < > 61*  --   --    TROPONIN I ng/mL  --   --   --   --  0 65* 0 56* 0 43*    < > = values in this interval not displayed

## 2020-01-14 NOTE — PROGRESS NOTES
[address] Internal Medicine Progress Note  Patient: Nasreen Chavez 75 y o  male   MRN: [id number]  PCP: Kole Harkins, DO  Unit/Bed#: E4 -01 Encounter: [id number]  Date Of Visit: 01/14/20      Assessment/plan  1  Severe sepsis due to MSSA bacteremia from left hallux wound- appreciate ID recommendations  Continue ancef  S/p picc  Pt scheduled for ELBERT tomorrow  He is s/p Left hallux amputation on 1/8/2020 and s/p I and D with removal of sesmoid bone on 1/12/2020  Appreciate podiatry recommendaitons     2  Acute respiratory failure with hypoxia due to acute diastolic chf  Continue diuresis  Continue to wean oxygen as tolerated     3  Acute diastolic congestive heart failure  Appreciate cardiology recommendations  Continue IV lasix  Possibly change to po lasix tomorrow       4  Acute renal failure/ckd3- baselines creatinine is 1 1-1 4  Currently creatinine is 2 15  Arb is being held  Have accepted a higher baseline as trying to treat pts hypervolemia  Will check bmp in am       5  Type 2 diabetes with hyperglycemia- will increase lantus to 22 units  Will continue insulin sliding scale  Continue to hold metformin     6  Obstructive sleep apnea     7  Paroxysmal atrial fibrillation- eliquis held for procedure  Will restart eliquis when okay by podiatry  Continue meotprolol     8  Hyponatremia     9 History of complete heart block, status post pacemaker     10  Normocytic anemia- likely due to acute blood loss anemia from recent procedures  Will monitor  11  Situational anxiety- continue ativan prn  Subjective:   Pt seen and examined  Pt is a little anxious today  He states he is breathing better  No f/c no cp no n/v/d no abd pain  Objective:     Vitals: Blood pressure 141/71, pulse 59, temperature 98 4 °F (36 9 °C), temperature source Temporal, resp  rate 20, weight 102 kg (223 lb 12 8 oz), SpO2 95 %  ,Body mass index is 32 4 kg/m²      Lab, Imaging and other studies:  Results from last 7 days   Lab Units 01/14/20  0444   WBC Thousand/uL 15 69*   HEMOGLOBIN g/dL 7 4*   HEMATOCRIT % 22 4*   PLATELETS Thousands/uL 426*     Results from last 7 days   Lab Units 01/14/20  0444   POTASSIUM mmol/L 3 7   CHLORIDE mmol/L 97*   CO2 mmol/L 22   BUN mg/dL 86*   CREATININE mg/dL 2 15*   CALCIUM mg/dL 7 9*     Results from last 7 days   Lab Units 01/08/20  0602 01/07/20  2135 01/07/20  1652   TROPONIN I ng/mL 0 65* 0 56* 0 43*     Lab Results   Component Value Date    BLOODCX No Growth After 5 Days  01/09/2020    BLOODCX No Growth After 5 Days  01/09/2020    BLOODCX Staphylococcus aureus (A) 01/07/2020    BLOODCX Staphylococcus aureus (A) 01/07/2020    WOUNDCULT 4+ Growth of Escherichia coli 07/19/2017    WOUNDCULT 1+ Growth of Mixed Skin Jem 07/19/2017    WOUNDCULT Growth in Broth culture only Citrobacter freundii 05/02/2017    SPUTUMCULTUR 2+ Growth of  05/03/2019    SPUTUMCULTUR  05/03/2019     Commensal respiratory jem only; No significant growth of Staph aureus/MRSA or Pseudomonas aeruginosa       Scheduled Meds:   Current Facility-Administered Medications:  acetaminophen 650 mg Oral Q6H PRN Melissa Ambron, DO    allopurinol 300 mg Oral QAM Rawland Friend, DPM    amLODIPine 10 mg Oral Daily Rawland Friend, DPM    cefazolin 2,000 mg Intravenous Q8H Rawland Friend, DPM Last Rate: 2,000 mg (01/14/20 1110)   furosemide 40 mg Intravenous BID (diuretic) Violet Villalobos PA-C    guaiFENesin 600 mg Oral BID Rawland Friend, DPM    insulin glargine 20 Units Subcutaneous HS Melissa Ambron, DO    insulin lispro 2-12 Units Subcutaneous TID AC Rawland Friend, DPM    ipratropium-albuterol 3 mL Nebulization Q6H PRN [facility] Friend, DPM    LORazepam 0 5 mg Oral Q8H PRN Melissa Ambron, DO    melatonin 6 mg Oral HS Rawland Friend, DPM    metoprolol tartrate 50 mg Oral Q12H [address] Rawland Friend, DPM    oxyCODONE 5 mg Oral Q4H PRN Marie Phillips, DPM    pravastatin 40 mg Oral Daily With Garry Diamond Cha, DPM    tamsulosin 0 4 mg Oral Daily With Jose Antonio Morton DPM Continuous Infusions:    PRN Meds:   acetaminophen    ipratropium-albuterol    LORazepam    oxyCODONE      Physical exam:  Physical Exam  General appearance: alert and oriented, in no acute distress  Head: Normocephalic, without obvious abnormality, atraumatic  Eyes: conjunctivae/corneas clear  PERRL, EOM's intact  Fundi benign    Neck: no adenopathy, no carotid bruit, no JVD, supple, symmetrical, trachea midline and thyroid not enlarged, symmetric, no tenderness/mass/nodules  Lungs: clear to auscultation bilaterally  Heart: regular rate and rhythm, S1, S2 normal, no murmur, click, rub or gallop  Abdomen: soft, non-tender; bowel sounds normal; no masses,  no organomegaly  Extremities: extremities normal, warm and well-perfused; no cyanosis, clubbing, or edema  Pulses: 2+ and symmetric  Skin: bandage left lower ext c/d/i  Neurologic: Mental status: Alert, oriented, thought content appropriate      VTE Pharmacologic Prophylaxis: Sequential compression device (Venodyne)   VTE Mechanical Prophylaxis: sequential compression device    Counseling / Coordination of Care  Total floor / unit time spent today 20 minutes      Current Length of Stay: 7 day(s)    Current Patient Status: Inpatient       Code Status: Level 1 - Full Code

## 2020-01-15 ENCOUNTER — APPOINTMENT (INPATIENT)
Dept: NON INVASIVE DIAGNOSTICS | Facility: HOSPITAL | Age: 65
DRG: 853 | End: 2020-01-15
Attending: INTERNAL MEDICINE
Payer: MEDICARE

## 2020-01-15 ENCOUNTER — APPOINTMENT (INPATIENT)
Dept: RADIOLOGY | Facility: HOSPITAL | Age: 65
DRG: 853 | End: 2020-01-15
Payer: MEDICARE

## 2020-01-15 LAB
ANION GAP SERPL CALCULATED.3IONS-SCNC: 10 MMOL/L (ref 4–13)
BUN SERPL-MCNC: 86 MG/DL (ref 5–25)
CALCIUM SERPL-MCNC: 8.3 MG/DL (ref 8.3–10.1)
CHLORIDE SERPL-SCNC: 98 MMOL/L (ref 100–108)
CO2 SERPL-SCNC: 24 MMOL/L (ref 21–32)
CREAT SERPL-MCNC: 2.06 MG/DL (ref 0.6–1.3)
ERYTHROCYTE [DISTWIDTH] IN BLOOD BY AUTOMATED COUNT: 13.9 % (ref 11.6–15.1)
GFR SERPL CREATININE-BSD FRML MDRD: 33 ML/MIN/1.73SQ M
GLUCOSE SERPL-MCNC: 155 MG/DL (ref 65–140)
GLUCOSE SERPL-MCNC: 172 MG/DL (ref 65–140)
GLUCOSE SERPL-MCNC: 174 MG/DL (ref 65–140)
GLUCOSE SERPL-MCNC: 194 MG/DL (ref 65–140)
GLUCOSE SERPL-MCNC: 253 MG/DL (ref 65–140)
HCT VFR BLD AUTO: 23.9 % (ref 36.5–49.3)
HGB BLD-MCNC: 7.8 G/DL (ref 12–17)
MCH RBC QN AUTO: 30 PG (ref 26.8–34.3)
MCHC RBC AUTO-ENTMCNC: 32.6 G/DL (ref 31.4–37.4)
MCV RBC AUTO: 92 FL (ref 82–98)
PLATELET # BLD AUTO: 481 THOUSANDS/UL (ref 149–390)
PMV BLD AUTO: 9.1 FL (ref 8.9–12.7)
POTASSIUM SERPL-SCNC: 3.6 MMOL/L (ref 3.5–5.3)
RBC # BLD AUTO: 2.6 MILLION/UL (ref 3.88–5.62)
SODIUM SERPL-SCNC: 132 MMOL/L (ref 136–145)
WBC # BLD AUTO: 16.24 THOUSAND/UL (ref 4.31–10.16)

## 2020-01-15 PROCEDURE — 80048 BASIC METABOLIC PNL TOTAL CA: CPT | Performed by: INTERNAL MEDICINE

## 2020-01-15 PROCEDURE — 99232 SBSQ HOSP IP/OBS MODERATE 35: CPT | Performed by: INTERNAL MEDICINE

## 2020-01-15 PROCEDURE — 93325 DOPPLER ECHO COLOR FLOW MAPG: CPT | Performed by: INTERNAL MEDICINE

## 2020-01-15 PROCEDURE — 93312 ECHO TRANSESOPHAGEAL: CPT

## 2020-01-15 PROCEDURE — 93320 DOPPLER ECHO COMPLETE: CPT | Performed by: INTERNAL MEDICINE

## 2020-01-15 PROCEDURE — 93312 ECHO TRANSESOPHAGEAL: CPT | Performed by: INTERNAL MEDICINE

## 2020-01-15 PROCEDURE — 71045 X-RAY EXAM CHEST 1 VIEW: CPT

## 2020-01-15 PROCEDURE — 94660 CPAP INITIATION&MGMT: CPT

## 2020-01-15 PROCEDURE — 99233 SBSQ HOSP IP/OBS HIGH 50: CPT | Performed by: INTERNAL MEDICINE

## 2020-01-15 PROCEDURE — 85027 COMPLETE CBC AUTOMATED: CPT | Performed by: INTERNAL MEDICINE

## 2020-01-15 PROCEDURE — 82948 REAGENT STRIP/BLOOD GLUCOSE: CPT

## 2020-01-15 PROCEDURE — 94760 N-INVAS EAR/PLS OXIMETRY 1: CPT

## 2020-01-15 RX ORDER — EPHEDRINE SULFATE 50 MG/ML
INJECTION INTRAVENOUS AS NEEDED
Status: DISCONTINUED | OUTPATIENT
Start: 2020-01-15 | End: 2020-01-15 | Stop reason: SURG

## 2020-01-15 RX ORDER — FUROSEMIDE 10 MG/ML
80 INJECTION INTRAMUSCULAR; INTRAVENOUS
Status: DISCONTINUED | OUTPATIENT
Start: 2020-01-15 | End: 2020-01-15

## 2020-01-15 RX ORDER — FUROSEMIDE 10 MG/ML
80 INJECTION INTRAMUSCULAR; INTRAVENOUS ONCE
Status: COMPLETED | OUTPATIENT
Start: 2020-01-15 | End: 2020-01-15

## 2020-01-15 RX ORDER — FUROSEMIDE 10 MG/ML
60 INJECTION INTRAMUSCULAR; INTRAVENOUS
Status: DISCONTINUED | OUTPATIENT
Start: 2020-01-16 | End: 2020-01-15

## 2020-01-15 RX ORDER — CEFAZOLIN SODIUM 2 G/50ML
2000 SOLUTION INTRAVENOUS EVERY 8 HOURS
Qty: 3450 ML | Refills: 0 | Status: SHIPPED | OUTPATIENT
Start: 2020-01-15 | End: 2020-02-13 | Stop reason: HOSPADM

## 2020-01-15 RX ORDER — PROPOFOL 10 MG/ML
INJECTION, EMULSION INTRAVENOUS AS NEEDED
Status: DISCONTINUED | OUTPATIENT
Start: 2020-01-15 | End: 2020-01-15 | Stop reason: SURG

## 2020-01-15 RX ORDER — LIDOCAINE HYDROCHLORIDE 20 MG/ML
INJECTION, SOLUTION EPIDURAL; INFILTRATION; INTRACAUDAL; PERINEURAL AS NEEDED
Status: DISCONTINUED | OUTPATIENT
Start: 2020-01-15 | End: 2020-01-15 | Stop reason: SURG

## 2020-01-15 RX ORDER — FUROSEMIDE 10 MG/ML
40 INJECTION INTRAMUSCULAR; INTRAVENOUS ONCE
Status: DISCONTINUED | OUTPATIENT
Start: 2020-01-15 | End: 2020-01-15

## 2020-01-15 RX ORDER — FUROSEMIDE 10 MG/ML
80 INJECTION INTRAMUSCULAR; INTRAVENOUS
Status: DISCONTINUED | OUTPATIENT
Start: 2020-01-15 | End: 2020-01-18

## 2020-01-15 RX ORDER — SODIUM CHLORIDE 9 MG/ML
125 INJECTION, SOLUTION INTRAVENOUS CONTINUOUS
Status: DISCONTINUED | OUTPATIENT
Start: 2020-01-15 | End: 2020-01-15

## 2020-01-15 RX ADMIN — TAMSULOSIN HYDROCHLORIDE 0.4 MG: 0.4 CAPSULE ORAL at 16:09

## 2020-01-15 RX ADMIN — METOPROLOL TARTRATE 50 MG: 50 TABLET, FILM COATED ORAL at 20:59

## 2020-01-15 RX ADMIN — FUROSEMIDE 80 MG: 10 INJECTION, SOLUTION INTRAMUSCULAR; INTRAVENOUS at 16:09

## 2020-01-15 RX ADMIN — GUAIFENESIN 600 MG: 600 TABLET ORAL at 20:59

## 2020-01-15 RX ADMIN — LORAZEPAM 0.5 MG: 1 TABLET ORAL at 04:28

## 2020-01-15 RX ADMIN — ACETAMINOPHEN 650 MG: 325 TABLET ORAL at 16:08

## 2020-01-15 RX ADMIN — EPHEDRINE SULFATE 10 MG: 50 INJECTION, SOLUTION INTRAVENOUS at 11:49

## 2020-01-15 RX ADMIN — APIXABAN 5 MG: 5 TABLET, FILM COATED ORAL at 17:42

## 2020-01-15 RX ADMIN — GUAIFENESIN 600 MG: 600 TABLET ORAL at 08:29

## 2020-01-15 RX ADMIN — INSULIN GLARGINE 22 UNITS: 100 INJECTION, SOLUTION SUBCUTANEOUS at 21:00

## 2020-01-15 RX ADMIN — AMLODIPINE BESYLATE 10 MG: 10 TABLET ORAL at 08:29

## 2020-01-15 RX ADMIN — INSULIN LISPRO 2 UNITS: 100 INJECTION, SOLUTION INTRAVENOUS; SUBCUTANEOUS at 08:29

## 2020-01-15 RX ADMIN — PROPOFOL 100 MG: 10 INJECTION, EMULSION INTRAVENOUS at 11:44

## 2020-01-15 RX ADMIN — ALLOPURINOL 300 MG: 100 TABLET ORAL at 08:29

## 2020-01-15 RX ADMIN — METOPROLOL TARTRATE 50 MG: 50 TABLET, FILM COATED ORAL at 08:29

## 2020-01-15 RX ADMIN — MELATONIN TAB 3 MG 6 MG: 3 TAB at 21:00

## 2020-01-15 RX ADMIN — CEFAZOLIN SODIUM 2000 MG: 2 SOLUTION INTRAVENOUS at 03:27

## 2020-01-15 RX ADMIN — PRAVASTATIN SODIUM 40 MG: 40 TABLET ORAL at 16:09

## 2020-01-15 RX ADMIN — LIDOCAINE HYDROCHLORIDE 100 MG: 20 INJECTION, SOLUTION EPIDURAL; INFILTRATION; INTRACAUDAL; PERINEURAL at 11:44

## 2020-01-15 RX ADMIN — PROPOFOL 50 MG: 10 INJECTION, EMULSION INTRAVENOUS at 11:52

## 2020-01-15 RX ADMIN — FUROSEMIDE 40 MG: 40 TABLET ORAL at 08:29

## 2020-01-15 RX ADMIN — INSULIN LISPRO 2 UNITS: 100 INJECTION, SOLUTION INTRAVENOUS; SUBCUTANEOUS at 16:09

## 2020-01-15 RX ADMIN — PROPOFOL 50 MG: 10 INJECTION, EMULSION INTRAVENOUS at 11:48

## 2020-01-15 RX ADMIN — FUROSEMIDE 80 MG: 10 INJECTION, SOLUTION INTRAMUSCULAR; INTRAVENOUS at 12:40

## 2020-01-15 RX ADMIN — CEFAZOLIN SODIUM 2000 MG: 2 SOLUTION INTRAVENOUS at 17:42

## 2020-01-15 RX ADMIN — PROPOFOL 50 MG: 10 INJECTION, EMULSION INTRAVENOUS at 11:56

## 2020-01-15 NOTE — PROGRESS NOTES
Progress Note - Infectious Disease   Edgardo Arce 75 y o  male MRN: [id number]  Unit/Bed#: E4 -01 Encounter: [id number]      Impression/Plan:  1  Sepsis   POA   Fever and leukocytosis   Likely secondary to MSSA bacteremia with left hallux wound and underlying osteopmyelitis as presumed source  No other clear source appreciated  Despite being systemically ill he has been hemodynamically stable  Today he is afebrile and his WBC count continues trending down  Repeat blood cultures are negative after 5 days  TTE was negative but he will complete a ELBERT later today for guidance on treatment duration   -antibiotic as below  -monitor CBC and BMP  -follow up repeat blood cultures  -check ELBERT  -monitor vitals  -supportive care     2  Dyane Duel in both sets of patient's initial blood cultures  Suspect patient's left great toe ulcer was source   Patient does have an implanted pacemaker  TTE was negative but he will complete a ELBERT later today for additional assessment  Repeat blood cultures are clear after 5 days  Patient is currently receiving IV cefazolin and is tolerating without difficulty  If his ELBERT is negative he will complete 4 weeks of IV antibiotic treatment, through 2/6/2020   -continue IV cefazolin, 2g q8 hours  -anticipate 4 weeks of IV antibiotics if ELBERT is negative, through 2/6/2020  -remove PICC after patient's final dose of IV antibiotics on 2/6/2020  -weekly CBCD and BMP while on IV antibiotics  -follow up repeat blood cultures  -check ELBERT  -monitor vitals  -follow up in the outpatient ID office on 1/28/2020 at 9am with Ayo ROSA Gist     3  Left hallux ulceration   With osteomyelitis as his wound does probe to bone and an x-ray of the left foot was suggestive of bony erosion at the 1st proximal phalanx   Purulence noted during initial podiatry wound exam, concern for possible infectious tenosynovitis   I suspect this was the source of patient's bacteremia above  Barak Babcock is now status post amputation of hallux for presumed surgical cure of the ulcer and osteomyelitis  Intraop wound cultures showed MSSA  He had additional I&D and removal and sesamoid bone on 1/12/2020    -serial left foot exams  -follow up intraop wound cultures  -local wound care per Podiatry  -continue close follow-up with Podiatry     4  Acute hypoxic respiratory failure   Suspect this is secondary to pulmonary edema and diastolic heart failure   BNP and troponin were elevated upon admission  Chest x-ray and CT of the chest did not show consolidations indicative of a bacterial process  Patient did initially require high-flow O2 but successfully weaned down to room air  He is following closely with Cardiology and received diuresis   Last night the patient reports he had some worsening shortness of breath but thinks that he was having a panic attack  Reports he feels his breathing is better this morning   -diuresis per Cardiology  -monitor vitals  -monitor respiratory status  -continue close follow-up with Cardiology     5  Type 2 diabetes mellitus with neuropathy   Patient's last hemoglobin A1c was 6 4% on 01/07/2020   This is risk factor for wounds and infection   Recommend tight glycemic control for overall health, especially in the setting of wound infection   -blood glucose management per primary service     6  Chronic diastolic heart failure   In setting of aortic and mitral valve stenosis and complete heart block   He has a [employer] dual chamber pacemaker in place   Now with Staph aureus bacteremia  TTE was negative but he should pursue ELBERT for further assessment for endocarditis/pacer infection  He is following closely with Cardiology   -check ELBERT  -continue follow-up with Cardiology     7  Paroxysmal AFib   On Eliquis  Above plan was discussed in detail with patient and his wife at the bedside  Above plan was discussed in detail with [facility] attending, Dr Vianca Gomez      Antibiotics:  Cefazolin 6  Antibiotics 9    Subjective:  Patient reports he had a rough night last night  States he woke up feeling very claustrophobic in his room and feels he may have had a panic attack  He tells me that he developed some shortness of breath after the panic attack but it seems to better this morning  He is having no pain in his left foot today  Is eager to get the ELBERT over with  Is hopeful he will be allowed to go home after it is done  He denies fever, chills, sweats, shakes; no nausea, vomiting, abdominal pain, diarrhea, or dysuria; no cough, congestion, or chest pain  No new symptoms  Objective:  Vitals:  Temp:  [97 7 °F (36 5 °C)-99 °F (37 2 °C)] 98 5 °F (36 9 °C)  HR:  [59-95] 91  Resp:  [18-24] 22  BP: (126-157)/(59-71) 157/65  SpO2:  [86 %-98 %] 96 %  Temp (24hrs), Av 5 °F (36 9 °C), Min:97 7 °F (36 5 °C), Max:99 °F (37 2 °C)  Current: Temperature: 98 5 °F (36 9 °C)    Physical Exam:   General Appearance:  Alert, interactive, appears anxious and somewhat unsettled; Patient is wearing his glasses   Throat: Oropharynx moist without lesions  Lungs:   Clear to auscultation bilaterally; no wheezes, rhonchi or rales; respirations unlabored; patient is on nasal cannula O2   Heart:  Tachycardic; +murmur, no rub or gallop   Abdomen:   Soft, non-tender, non-distended, positive bowel sounds  Extremities: No clubbing or cyanosis, no edema; left lower extremity dressing is clean, dry, intact, without spreading erythema from the site; right upper arm PICC intact, no leakage or spreading erythema at site   Skin: No new rashes, lesions, or draining wounds noted on exposed skin       Labs, Imaging, & Other studies:   All pertinent labs and imaging studies were personally reviewed  Results from last 7 days   Lab Units 01/15/20  0526 20  0444 20  0634   WBC Thousand/uL 16 24* 15 69* 15 34*   HEMOGLOBIN g/dL 7 8* 7 4* 7 5*   PLATELETS Thousands/uL 481* 426* 372     Results from last 7 days   Lab Units 01/15/20  0587 POTASSIUM mmol/L 3 6   CHLORIDE mmol/L 98*   CO2 mmol/L 24   BUN mg/dL 86*   CREATININE mg/dL 2 06*   EGFR ml/min/1 73sq m 33   CALCIUM mg/dL 8 3     Results from last 7 days   Lab Units 01/09/20  0413 01/09/20  0411 01/08/20  1808 01/08/20  1759   BLOOD CULTURE  No Growth After 5 Days  No Growth After 5 Days    --   --    GRAM STAIN RESULT   --   --  Rare Polys*  Rare Gram positive cocci in pairs* 1+ Disintegrating polys*  1+ Gram positive cocci in pairs*  1+ Gram positive cocci in clusters*

## 2020-01-15 NOTE — PROGRESS NOTES
[address] Internal Medicine Progress Note  Patient: Edgardo Arce 75 y o  male   MRN: [id number]  PCP: Jarrett Canas, DO  Unit/Bed#: E4 -01 Encounter: [id number]  Date Of Visit: 01/15/20      Assessment/plan  1  Severe sepsis due to MSSA bacteremia from left hallux wound- appreciate ID recommendations  Continue ancef  S/p picc  S/p ELBERT that was negative for vegetation  He is s/p Left hallux amputation on 1/8/2020 and s/p I and D with removal of sesmoid bone on 1/12/2020  Appreciate podiatry recommendaitons He will continue ancef through 2/6/20  He will need repeat blood cultures 2 weeks after completion of antibiotic treatment       2  Acute respiratory failure with hypoxia due to acute diastolic chf  Continue diuresis  Continue to wean oxygen as tolerated     3  Acute diastolic congestive heart failure  Appreciate cardiology recommendations  Continue IV lasix  increased to 80mg IV bid  Pt will have 60mg IV lasix tomorrow       4  Acute renal failure/ckd3- baselines creatinine is 1 1-1 4  Currently creatinine is 2 06  Arb is being held  Have accepted a higher baseline as trying to treat pts hypervolemia  Will check bmp in am       5  Type 2 diabetes with hyperglycemia- will increase lantus to 22 units  Will continue insulin sliding scale  Continue to hold metformin     6  Obstructive sleep apnea     7  Paroxysmal atrial fibrillation- eliquis held for procedure  Will restart eliquis Continue metoprolol     8  Hyponatremia- improved       9 History of complete heart block, status post pacemaker     10  Normocytic anemia- likely due to acute blood loss anemia from recent procedures  Will monitor       11  Situational anxiety- continue ativan prn  Subjective:   Pt seen and examined  Pt is more calm today after ELBERT  He states he is breathing better  No f/c no cp no n/v/d no abd pain  Objective:     Vitals: Blood pressure 139/63, pulse 82, temperature 98 6 °F (37 °C), resp   rate 20, weight 101 kg (221 lb 12 5 oz), SpO2 96 %  ,Body mass index is 32 11 kg/m²  Lab, Imaging and other studies:  Results from last 7 days   Lab Units 01/15/20  0526   WBC Thousand/uL 16 24*   HEMOGLOBIN g/dL 7 8*   HEMATOCRIT % 23 9*   PLATELETS Thousands/uL 481*     Results from last 7 days   Lab Units 01/15/20  0526   POTASSIUM mmol/L 3 6   CHLORIDE mmol/L 98*   CO2 mmol/L 24   BUN mg/dL 86*   CREATININE mg/dL 2 06*   CALCIUM mg/dL 8 3         Lab Results   Component Value Date    BLOODCX No Growth After 5 Days  01/09/2020    BLOODCX No Growth After 5 Days  01/09/2020    BLOODCX Staphylococcus aureus (A) 01/07/2020    BLOODCX Staphylococcus aureus (A) 01/07/2020    WOUNDCULT 4+ Growth of Escherichia coli 07/19/2017    WOUNDCULT 1+ Growth of Mixed Skin Jem 07/19/2017    WOUNDCULT Growth in Broth culture only Citrobacter freundii 05/02/2017    SPUTUMCULTUR 2+ Growth of  05/03/2019    SPUTUMCULTUR  05/03/2019     Commensal respiratory jem only; No significant growth of Staph aureus/MRSA or Pseudomonas aeruginosa       Scheduled Meds:   Current Facility-Administered Medications:  acetaminophen 650 mg Oral Q6H PRN Melissa Lauraron, DO    allopurinol 300 mg Oral QAM Milly Garay DPM    amLODIPine 10 mg Oral Daily EDWIN BrockM    apixaban 5 mg Oral BID Melissa Alonso, DO    cefazolin 2,000 mg Intravenous Q8H Milly Garay DPM Last Rate: Stopped (01/15/20 0357)   furosemide 80 mg Intravenous BID (diuretic) Lorena Lane MD    guaiFENesin 600 mg Oral BID Milly Garay DPM    insulin glargine 22 Units Subcutaneous HS Melissa Alonso, DO    insulin lispro 2-12 Units Subcutaneous TID AC Milly Garay DPM    ipratropium-albuterol 3 mL Nebulization Q6H PRN Milly Garay DPM    LORazepam 0 5 mg Oral Q8H PRN Melissa Alonso, DO    melatonin 6 mg Oral HS Milly Garay DPM    metoprolol tartrate 50 mg Oral Q12H [facility] EDWIN BrockM    oxyCODONE 5 mg Oral Q4H PRN Milly Garay DPM    pravastatin 40 mg Oral Daily With Reynaldo Winkler DPM    tamsulosin 0 4 mg Oral Daily With Alex Goff Cha, DPM      Continuous Infusions:    PRN Meds:   acetaminophen    ipratropium-albuterol    LORazepam    oxyCODONE      Physical exam:  Physical Exam  General appearance: alert and oriented, in no acute distress  Head: Normocephalic, without obvious abnormality, atraumatic  Eyes: conjunctivae/corneas clear  PERRL, EOM's intact  Fundi benign    Neck: no adenopathy, no carotid bruit, no JVD, supple, symmetrical, trachea midline and thyroid not enlarged, symmetric, no tenderness/mass/nodules  Lungs: minimal crackles at bases bilateral  Heart: regular rate and rhythm, S1, S2 normal, no murmur, click, rub or gallop  Abdomen: soft, non-tender; bowel sounds normal; no masses,  no organomegaly  Extremities: extremities normal, warm and well-perfused; no cyanosis, clubbing, or edema  Pulses: 2+ and symmetric  Skin: bandage c/d/i  Neurologic: Mental status: Alert, oriented, thought content appropriate      VTE Pharmacologic Prophylaxis: eliquis  VTE Mechanical Prophylaxis: sequential compression device    Counseling / Coordination of Care  Total floor / unit time spent today 20 minutes      Current Length of Stay: 8 day(s)    Current Patient Status: Inpatient       Code Status: Level 1 - Full Code

## 2020-01-15 NOTE — PHYSICAL THERAPY NOTE
Physical Therapy Cancellation Note          Pt currently on bedrest for the next 2 hours  Will attempt at a later time    Ami Mojica, PTA

## 2020-01-15 NOTE — OCCUPATIONAL THERAPY NOTE
Occupational Therapy  OT tx session cancelled  Pt on bedrest at attempted tx time  Will continue to follow as available   VILMA Torres

## 2020-01-15 NOTE — SOCIAL WORK
MD wrote script for w/c and this was fax to [facility]  The script for weekly CBCD and BMP was fax to [address] [place] [address]  with IV abx  The IV Abx script was also fax to [facility] today

## 2020-01-15 NOTE — PLAN OF CARE
Problem: Potential for Falls  Goal: Patient will remain free of falls  Description  INTERVENTIONS:  - Assess patient frequently for physical needs  -  Identify cognitive and physical deficits and behaviors that affect risk of falls    -  [place] fall precautions as indicated by assessment   - Educate patient/family on patient safety including physical limitations  - Instruct patient to call for assistance with activity based on assessment  - Modify environment to reduce risk of injury  - Consider OT/PT consult to assist with strengthening/mobility  Outcome: Progressing     Problem: PAIN - ADULT  Goal: Verbalizes/displays adequate comfort level or baseline comfort level  Description  Interventions:  - Encourage patient to monitor pain and request assistance  - Assess pain using appropriate pain scale  - Administer analgesics based on type and severity of pain and evaluate response  - Implement non-pharmacological measures as appropriate and evaluate response  - Consider cultural and social influences on pain and pain management  - Notify physician/advanced practitioner if interventions unsuccessful or patient reports new pain  Outcome: Progressing     Problem: INFECTION - ADULT  Goal: Absence or prevention of progression during hospitalization  Description  INTERVENTIONS:  - Assess and monitor for signs and symptoms of infection  - Monitor lab/diagnostic results  - Monitor all insertion sites, i e  indwelling lines, tubes, and drains  - Administer medications as ordered  - Instruct and encourage patient and family to use good hand hygiene technique  - Identify and instruct in appropriate isolation precautions for identified infection/condition   Outcome: Progressing     Problem: SAFETY ADULT  Goal: Maintain or return to baseline ADL function  Description  INTERVENTIONS:  -  Assess patient's ability to carry out ADLs; assess patient's baseline for ADL function and identify physical deficits which impact ability to perform ADLs (bathing, care of mouth/teeth, toileting, grooming, dressing, etc )  - Assess/evaluate cause of self-care deficits   - Assess range of motion  - Assess patient's mobility; develop plan if impaired  - Assess patient's need for assistive devices and provide as appropriate  - Encourage maximum independence but intervene and supervise when necessary  - Involve family in performance of ADLs  - Assess for home care needs following discharge   - Consider OT consult to assist with ADL evaluation and planning for discharge  - Provide patient education as appropriate  Outcome: Progressing  Goal: Maintain or return mobility status to optimal level  Description  INTERVENTIONS:  - Assess patient's baseline mobility status (ambulation, transfers, stairs, etc )    - Identify cognitive and physical deficits and behaviors that affect mobility  - Identify mobility aids required to assist with transfers and/or ambulation (gait belt, sit-to-stand, lift, walker, cane, etc )  - [place] fall precautions as indicated by assessment  - Record patient progress and toleration of activity level on Mobility SBAR; progress patient to next Phase/Stage  - Instruct patient to call for assistance with activity based on assessment  - Consider rehabilitation consult to assist with strengthening/weightbearing, etc   Outcome: Progressing     Problem: DISCHARGE PLANNING  Goal: Discharge to home or other facility with appropriate resources  Description  INTERVENTIONS:  - Identify barriers to discharge w/patient and caregiver  - Arrange for needed discharge resources and transportation as appropriate  - Identify discharge learning needs (meds, wound care, etc )  - Arrange for interpretive services to assist at discharge as needed  - Refer to Case Management Department for coordinating discharge planning if the patient needs post-hospital services based on physician/advanced practitioner order or complex needs related to functional status, cognitive ability, or social support system  Outcome: Progressing     Problem: Knowledge Deficit  Goal: Patient/family/caregiver demonstrates understanding of disease process, treatment plan, medications, and discharge instructions  Description  Complete learning assessment and assess knowledge base    Interventions:  - Provide teaching at level of understanding  - Provide teaching via preferred learning methods  Outcome: Progressing     Problem: CARDIOVASCULAR - ADULT  Goal: Maintains optimal cardiac output and hemodynamic stability  Description  INTERVENTIONS:  - Monitor I/O, vital signs and rhythm  - Monitor for S/S and trends of decreased cardiac output  - Administer and titrate ordered vasoactive medications to optimize hemodynamic stability  - Assess quality of pulses, skin color and temperature  - Assess for signs of decreased coronary artery perfusion  - Instruct patient to report change in severity of symptoms  Outcome: Progressing  Goal: Absence of cardiac dysrhythmias or at baseline rhythm  Description  INTERVENTIONS:  - Continuous cardiac monitoring, vital signs, obtain 12 lead EKG if ordered  - Administer antiarrhythmic and heart rate control medications as ordered  - Monitor electrolytes and administer replacement therapy as ordered  Outcome: Progressing     Problem: RESPIRATORY - ADULT  Goal: Achieves optimal ventilation and oxygenation  Description  INTERVENTIONS:  - Assess for changes in respiratory status  - Assess for changes in mentation and behavior  - Position to facilitate oxygenation and minimize respiratory effort  - Oxygen administered by appropriate delivery if ordered  - Initiate smoking cessation education as indicated  - Encourage broncho-pulmonary hygiene including cough, deep breathe, Incentive Spirometry  - Assess the need for suctioning and aspirate as needed  - Assess and instruct to report SOB or any respiratory difficulty  - Respiratory Therapy support as indicated  Outcome: Progressing     Problem: Prexisting or High Potential for Compromised Skin Integrity  Goal: Skin integrity is maintained or improved  Description  INTERVENTIONS:  - Identify patients at risk for skin breakdown  - Assess and monitor skin integrity  - Assess and monitor nutrition and hydration status  - Monitor labs   - Assess for incontinence   - Turn and reposition patient  - Assist with mobility/ambulation  - Relieve pressure over bony prominences  - Avoid friction and shearing  - Provide appropriate hygiene as needed including keeping skin clean and dry  - Evaluate need for skin moisturizer/barrier cream  - Collaborate with interdisciplinary team   - Patient/family teaching  - Consider wound care consult   Outcome: Progressing     Problem: Nutrition/Hydration-ADULT  Goal: Nutrient/Hydration intake appropriate for improving, restoring or maintaining nutritional needs  Description  Monitor and assess patient's nutrition/hydration status for malnutrition  Collaborate with interdisciplinary team and initiate plan and interventions as ordered  Monitor patient's weight and dietary intake as ordered or per policy  Utilize nutrition screening tool and intervene as necessary  Determine patient's food preferences and provide high-protein, high-caloric foods as appropriate       INTERVENTIONS:  - Monitor oral intake, urinary output, labs, and treatment plans  - Assess nutrition and hydration status and recommend course of action  - Evaluate amount of meals eaten  - Assist patient with eating if necessary   - Allow adequate time for meals  - Recommend/ encourage appropriate diets, oral nutritional supplements, and vitamin/mineral supplements  - Order, calculate, and assess calorie counts as needed  - Recommend, monitor, and adjust tube feedings and TPN/PPN based on assessed needs  - Assess need for intravenous fluids  - Provide specific nutrition/hydration education as appropriate  - Include patient/family/caregiver in decisions related to nutrition  Outcome: Progressing     Problem: METABOLIC, FLUID AND ELECTROLYTES - ADULT  Goal: Glucose maintained within target range  Description  INTERVENTIONS:  - Monitor Blood Glucose as ordered  - Assess for signs and symptoms of hyperglycemia and hypoglycemia  - Administer ordered medications to maintain glucose within target range  - Assess nutritional intake and initiate nutrition service referral as needed  Outcome: Progressing     Problem: SKIN/TISSUE INTEGRITY - ADULT  Goal: Skin integrity remains intact  Description  INTERVENTIONS  - Identify patients at risk for skin breakdown  - Assess and monitor skin integrity  - Assess and monitor nutrition and hydration status  - Monitor labs (i e  albumin)  - Assess for incontinence   - Turn and reposition patient  - Assist with mobility/ambulation  - Relieve pressure over bony prominences  - Avoid friction and shearing  - Provide appropriate hygiene as needed including keeping skin clean and dry  - Evaluate need for skin moisturizer/barrier cream  - Collaborate with interdisciplinary team (i e  Nutrition, Rehabilitation, etc )   - Patient/family teaching  Outcome: Progressing  Goal: Incision(s), wounds(s) or drain site(s) healing without S/S of infection  Description  INTERVENTIONS  - Assess and document risk factors for skin impairment   - Assess and document dressing, incision, wound bed, drain sites and surrounding tissue  - Consider nutrition services referral as needed  - Oral mucous membranes remain intact  - Provide patient/ family education  Outcome: Progressing  Goal: Oral mucous membranes remain intact  Description  INTERVENTIONS  - Assess oral mucosa and hygiene practices  - Implement preventative oral hygiene regimen  - Implement oral medicated treatments as ordered  - Initiate Nutrition services referral as needed  Outcome: Progressing

## 2020-01-15 NOTE — NURSING NOTE
I contacted on call [facility] provider Isacc Rouse to alert him of patient's sepsis re-screen  The second screening had the patient meeting 3 sepsis criteria instead of the previously only meeting 1  He also had an elevated creatinine level over 2  No new orders or interventions at this time  Will continue to monitor and pass onto next shift

## 2020-01-15 NOTE — QUICK NOTE
Patient seen in examined after [facility]  Is feeling well but had some shortness of breath overnight  Does not feel a he is urinating very much  Weight this morning was 220 lb with previous dry weight being around 210  Discussed with his wife earlier in the afternoon she is concerned he is retaining fluid and seems to downplay some of his symptoms  80 mg IV Lasix given today, will continue IV diuresis tomorrow 60 b i d    Restart Eliquis as directed by Podiatry   Hopeful for discharge from our standpoint towards the end of the week

## 2020-01-15 NOTE — ANESTHESIA POSTPROCEDURE EVALUATION
Post-Op Assessment Note    CV Status:  Stable    Pain management: adequate     Mental Status:  Alert and awake   Hydration Status:  Euvolemic   PONV Controlled:  Controlled   Airway Patency:  Patent   Post Op Vitals Reviewed: Yes      Staff: Anesthesiologist           /66 (01/15/20 1415)    Temp      Pulse 82 (01/15/20 1415)   Resp      SpO2

## 2020-01-16 LAB
ABO GROUP BLD: NORMAL
ABO GROUP BLD: NORMAL
ANION GAP SERPL CALCULATED.3IONS-SCNC: 11 MMOL/L (ref 4–13)
BLD GP AB SCN SERPL QL: NEGATIVE
BUN SERPL-MCNC: 89 MG/DL (ref 5–25)
CALCIUM SERPL-MCNC: 8.5 MG/DL (ref 8.3–10.1)
CHLORIDE SERPL-SCNC: 101 MMOL/L (ref 100–108)
CO2 SERPL-SCNC: 22 MMOL/L (ref 21–32)
CREAT SERPL-MCNC: 2 MG/DL (ref 0.6–1.3)
ERYTHROCYTE [DISTWIDTH] IN BLOOD BY AUTOMATED COUNT: 14.3 % (ref 11.6–15.1)
FERRITIN SERPL-MCNC: 94 NG/ML (ref 8–388)
GFR SERPL CREATININE-BSD FRML MDRD: 34 ML/MIN/1.73SQ M
GLUCOSE SERPL-MCNC: 131 MG/DL (ref 65–140)
GLUCOSE SERPL-MCNC: 156 MG/DL (ref 65–140)
GLUCOSE SERPL-MCNC: 193 MG/DL (ref 65–140)
GLUCOSE SERPL-MCNC: 206 MG/DL (ref 65–140)
GLUCOSE SERPL-MCNC: 221 MG/DL (ref 65–140)
HCT VFR BLD AUTO: 23.1 % (ref 36.5–49.3)
HGB BLD-MCNC: 7.4 G/DL (ref 12–17)
IRON SATN MFR SERPL: 11 %
IRON SERPL-MCNC: 33 UG/DL (ref 65–175)
MCH RBC QN AUTO: 29.5 PG (ref 26.8–34.3)
MCHC RBC AUTO-ENTMCNC: 32 G/DL (ref 31.4–37.4)
MCV RBC AUTO: 92 FL (ref 82–98)
PLATELET # BLD AUTO: 458 THOUSANDS/UL (ref 149–390)
PMV BLD AUTO: 9 FL (ref 8.9–12.7)
POTASSIUM SERPL-SCNC: 3.8 MMOL/L (ref 3.5–5.3)
RBC # BLD AUTO: 2.51 MILLION/UL (ref 3.88–5.62)
RH BLD: POSITIVE
RH BLD: POSITIVE
SODIUM SERPL-SCNC: 134 MMOL/L (ref 136–145)
SPECIMEN EXPIRATION DATE: NORMAL
TIBC SERPL-MCNC: 314 UG/DL (ref 250–450)
WBC # BLD AUTO: 13.37 THOUSAND/UL (ref 4.31–10.16)

## 2020-01-16 PROCEDURE — 86900 BLOOD TYPING SEROLOGIC ABO: CPT | Performed by: INTERNAL MEDICINE

## 2020-01-16 PROCEDURE — 80048 BASIC METABOLIC PNL TOTAL CA: CPT | Performed by: INTERNAL MEDICINE

## 2020-01-16 PROCEDURE — 97535 SELF CARE MNGMENT TRAINING: CPT

## 2020-01-16 PROCEDURE — 85027 COMPLETE CBC AUTOMATED: CPT | Performed by: INTERNAL MEDICINE

## 2020-01-16 PROCEDURE — 97110 THERAPEUTIC EXERCISES: CPT

## 2020-01-16 PROCEDURE — 82948 REAGENT STRIP/BLOOD GLUCOSE: CPT

## 2020-01-16 PROCEDURE — 86920 COMPATIBILITY TEST SPIN: CPT

## 2020-01-16 PROCEDURE — P9016 RBC LEUKOCYTES REDUCED: HCPCS

## 2020-01-16 PROCEDURE — 83540 ASSAY OF IRON: CPT | Performed by: PHYSICIAN ASSISTANT

## 2020-01-16 PROCEDURE — 30233N1 TRANSFUSION OF NONAUTOLOGOUS RED BLOOD CELLS INTO PERIPHERAL VEIN, PERCUTANEOUS APPROACH: ICD-10-PCS | Performed by: INTERNAL MEDICINE

## 2020-01-16 PROCEDURE — 82728 ASSAY OF FERRITIN: CPT | Performed by: PHYSICIAN ASSISTANT

## 2020-01-16 PROCEDURE — 99233 SBSQ HOSP IP/OBS HIGH 50: CPT | Performed by: INTERNAL MEDICINE

## 2020-01-16 PROCEDURE — 99232 SBSQ HOSP IP/OBS MODERATE 35: CPT

## 2020-01-16 PROCEDURE — 83550 IRON BINDING TEST: CPT | Performed by: PHYSICIAN ASSISTANT

## 2020-01-16 PROCEDURE — 86850 RBC ANTIBODY SCREEN: CPT | Performed by: INTERNAL MEDICINE

## 2020-01-16 PROCEDURE — 86901 BLOOD TYPING SEROLOGIC RH(D): CPT | Performed by: INTERNAL MEDICINE

## 2020-01-16 PROCEDURE — 94660 CPAP INITIATION&MGMT: CPT

## 2020-01-16 RX ORDER — DOXYCYCLINE HYCLATE 50 MG/1
324 CAPSULE, GELATIN COATED ORAL
Status: DISCONTINUED | OUTPATIENT
Start: 2020-01-16 | End: 2020-01-29 | Stop reason: HOSPADM

## 2020-01-16 RX ADMIN — CEFAZOLIN SODIUM 2000 MG: 2 SOLUTION INTRAVENOUS at 17:54

## 2020-01-16 RX ADMIN — LORAZEPAM 0.5 MG: 1 TABLET ORAL at 14:29

## 2020-01-16 RX ADMIN — CEFAZOLIN SODIUM 2000 MG: 2 SOLUTION INTRAVENOUS at 09:55

## 2020-01-16 RX ADMIN — FUROSEMIDE 80 MG: 10 INJECTION, SOLUTION INTRAMUSCULAR; INTRAVENOUS at 17:05

## 2020-01-16 RX ADMIN — INSULIN LISPRO 2 UNITS: 100 INJECTION, SOLUTION INTRAVENOUS; SUBCUTANEOUS at 08:11

## 2020-01-16 RX ADMIN — MELATONIN TAB 3 MG 6 MG: 3 TAB at 21:28

## 2020-01-16 RX ADMIN — APIXABAN 5 MG: 5 TABLET, FILM COATED ORAL at 17:06

## 2020-01-16 RX ADMIN — CEFAZOLIN SODIUM 2000 MG: 2 SOLUTION INTRAVENOUS at 03:20

## 2020-01-16 RX ADMIN — FERROUS GLUCONATE 324 MG: 324 TABLET ORAL at 17:06

## 2020-01-16 RX ADMIN — FUROSEMIDE 80 MG: 10 INJECTION, SOLUTION INTRAMUSCULAR; INTRAVENOUS at 08:05

## 2020-01-16 RX ADMIN — METOPROLOL TARTRATE 50 MG: 50 TABLET, FILM COATED ORAL at 08:06

## 2020-01-16 RX ADMIN — INSULIN LISPRO 4 UNITS: 100 INJECTION, SOLUTION INTRAVENOUS; SUBCUTANEOUS at 17:16

## 2020-01-16 RX ADMIN — AMLODIPINE BESYLATE 10 MG: 10 TABLET ORAL at 08:06

## 2020-01-16 RX ADMIN — ALLOPURINOL 300 MG: 100 TABLET ORAL at 08:06

## 2020-01-16 RX ADMIN — GUAIFENESIN 600 MG: 600 TABLET ORAL at 21:28

## 2020-01-16 RX ADMIN — TAMSULOSIN HYDROCHLORIDE 0.4 MG: 0.4 CAPSULE ORAL at 17:06

## 2020-01-16 RX ADMIN — PRAVASTATIN SODIUM 40 MG: 40 TABLET ORAL at 17:06

## 2020-01-16 RX ADMIN — APIXABAN 5 MG: 5 TABLET, FILM COATED ORAL at 08:06

## 2020-01-16 RX ADMIN — INSULIN LISPRO 4 UNITS: 100 INJECTION, SOLUTION INTRAVENOUS; SUBCUTANEOUS at 11:46

## 2020-01-16 RX ADMIN — GUAIFENESIN 600 MG: 600 TABLET ORAL at 08:06

## 2020-01-16 RX ADMIN — METOPROLOL TARTRATE 50 MG: 50 TABLET, FILM COATED ORAL at 21:28

## 2020-01-16 RX ADMIN — INSULIN GLARGINE 22 UNITS: 100 INJECTION, SOLUTION SUBCUTANEOUS at 21:28

## 2020-01-16 NOTE — PLAN OF CARE
Problem: Potential for Falls  Goal: Patient will remain free of falls  Description  INTERVENTIONS:  - Assess patient frequently for physical needs  -  Identify cognitive and physical deficits and behaviors that affect risk of falls    -  [place] fall precautions as indicated by assessment   - Educate patient/family on patient safety including physical limitations  - Instruct patient to call for assistance with activity based on assessment  - Modify environment to reduce risk of injury  - Consider OT/PT consult to assist with strengthening/mobility  Outcome: Progressing     Problem: PAIN - ADULT  Goal: Verbalizes/displays adequate comfort level or baseline comfort level  Description  Interventions:  - Encourage patient to monitor pain and request assistance  - Assess pain using appropriate pain scale  - Administer analgesics based on type and severity of pain and evaluate response  - Implement non-pharmacological measures as appropriate and evaluate response  - Consider cultural and social influences on pain and pain management  - Notify physician/advanced practitioner if interventions unsuccessful or patient reports new pain  Outcome: Progressing     Problem: INFECTION - ADULT  Goal: Absence or prevention of progression during hospitalization  Description  INTERVENTIONS:  - Assess and monitor for signs and symptoms of infection  - Monitor lab/diagnostic results  - Monitor all insertion sites, i e  indwelling lines, tubes, and drains  - Administer medications as ordered  - Instruct and encourage patient and family to use good hand hygiene technique  - Identify and instruct in appropriate isolation precautions for identified infection/condition   Outcome: Progressing     Problem: SAFETY ADULT  Goal: Maintain or return to baseline ADL function  Description  INTERVENTIONS:  -  Assess patient's ability to carry out ADLs; assess patient's baseline for ADL function and identify physical deficits which impact ability to perform ADLs (bathing, care of mouth/teeth, toileting, grooming, dressing, etc )  - Assess/evaluate cause of self-care deficits   - Assess range of motion  - Assess patient's mobility; develop plan if impaired  - Assess patient's need for assistive devices and provide as appropriate  - Encourage maximum independence but intervene and supervise when necessary  - Involve family in performance of ADLs  - Assess for home care needs following discharge   - Consider OT consult to assist with ADL evaluation and planning for discharge  - Provide patient education as appropriate  Outcome: Progressing  Goal: Maintain or return mobility status to optimal level  Description  INTERVENTIONS:  - Assess patient's baseline mobility status (ambulation, transfers, stairs, etc )    - Identify cognitive and physical deficits and behaviors that affect mobility  - Identify mobility aids required to assist with transfers and/or ambulation (gait belt, sit-to-stand, lift, walker, cane, etc )  - [place] fall precautions as indicated by assessment  - Record patient progress and toleration of activity level on Mobility SBAR; progress patient to next Phase/Stage  - Instruct patient to call for assistance with activity based on assessment  - Consider rehabilitation consult to assist with strengthening/weightbearing, etc   Outcome: Progressing     Problem: DISCHARGE PLANNING  Goal: Discharge to home or other facility with appropriate resources  Description  INTERVENTIONS:  - Identify barriers to discharge w/patient and caregiver  - Arrange for needed discharge resources and transportation as appropriate  - Identify discharge learning needs (meds, wound care, etc )  - Arrange for interpretive services to assist at discharge as needed  - Refer to Case Management Department for coordinating discharge planning if the patient needs post-hospital services based on physician/advanced practitioner order or complex needs related to functional status, cognitive ability, or social support system  Outcome: Progressing     Problem: Knowledge Deficit  Goal: Patient/family/caregiver demonstrates understanding of disease process, treatment plan, medications, and discharge instructions  Description  Complete learning assessment and assess knowledge base    Interventions:  - Provide teaching at level of understanding  - Provide teaching via preferred learning methods  Outcome: Progressing     Problem: CARDIOVASCULAR - ADULT  Goal: Maintains optimal cardiac output and hemodynamic stability  Description  INTERVENTIONS:  - Monitor I/O, vital signs and rhythm  - Monitor for S/S and trends of decreased cardiac output  - Administer and titrate ordered vasoactive medications to optimize hemodynamic stability  - Assess quality of pulses, skin color and temperature  - Assess for signs of decreased coronary artery perfusion  - Instruct patient to report change in severity of symptoms  Outcome: Progressing  Goal: Absence of cardiac dysrhythmias or at baseline rhythm  Description  INTERVENTIONS:  - Continuous cardiac monitoring, vital signs, obtain 12 lead EKG if ordered  - Administer antiarrhythmic and heart rate control medications as ordered  - Monitor electrolytes and administer replacement therapy as ordered  Outcome: Progressing     Problem: RESPIRATORY - ADULT  Goal: Achieves optimal ventilation and oxygenation  Description  INTERVENTIONS:  - Assess for changes in respiratory status  - Assess for changes in mentation and behavior  - Position to facilitate oxygenation and minimize respiratory effort  - Oxygen administered by appropriate delivery if ordered  - Initiate smoking cessation education as indicated  - Encourage broncho-pulmonary hygiene including cough, deep breathe, Incentive Spirometry  - Assess the need for suctioning and aspirate as needed  - Assess and instruct to report SOB or any respiratory difficulty  - Respiratory Therapy support as indicated  Outcome: Progressing     Problem: Prexisting or High Potential for Compromised Skin Integrity  Goal: Skin integrity is maintained or improved  Description  INTERVENTIONS:  - Identify patients at risk for skin breakdown  - Assess and monitor skin integrity  - Assess and monitor nutrition and hydration status  - Monitor labs   - Assess for incontinence   - Turn and reposition patient  - Assist with mobility/ambulation  - Relieve pressure over bony prominences  - Avoid friction and shearing  - Provide appropriate hygiene as needed including keeping skin clean and dry  - Evaluate need for skin moisturizer/barrier cream  - Collaborate with interdisciplinary team   - Patient/family teaching  - Consider wound care consult   Outcome: Progressing     Problem: Nutrition/Hydration-ADULT  Goal: Nutrient/Hydration intake appropriate for improving, restoring or maintaining nutritional needs  Description  Monitor and assess patient's nutrition/hydration status for malnutrition  Collaborate with interdisciplinary team and initiate plan and interventions as ordered  Monitor patient's weight and dietary intake as ordered or per policy  Utilize nutrition screening tool and intervene as necessary  Determine patient's food preferences and provide high-protein, high-caloric foods as appropriate       INTERVENTIONS:  - Monitor oral intake, urinary output, labs, and treatment plans  - Assess nutrition and hydration status and recommend course of action  - Evaluate amount of meals eaten  - Assist patient with eating if necessary   - Allow adequate time for meals  - Recommend/ encourage appropriate diets, oral nutritional supplements, and vitamin/mineral supplements  - Order, calculate, and assess calorie counts as needed  - Recommend, monitor, and adjust tube feedings and TPN/PPN based on assessed needs  - Assess need for intravenous fluids  - Provide specific nutrition/hydration education as appropriate  - Include patient/family/caregiver in decisions related to nutrition  Outcome: Progressing     Problem: METABOLIC, FLUID AND ELECTROLYTES - ADULT  Goal: Glucose maintained within target range  Description  INTERVENTIONS:  - Monitor Blood Glucose as ordered  - Assess for signs and symptoms of hyperglycemia and hypoglycemia  - Administer ordered medications to maintain glucose within target range  - Assess nutritional intake and initiate nutrition service referral as needed  Outcome: Progressing     Problem: SKIN/TISSUE INTEGRITY - ADULT  Goal: Skin integrity remains intact  Description  INTERVENTIONS  - Identify patients at risk for skin breakdown  - Assess and monitor skin integrity  - Assess and monitor nutrition and hydration status  - Monitor labs (i e  albumin)  - Assess for incontinence   - Turn and reposition patient  - Assist with mobility/ambulation  - Relieve pressure over bony prominences  - Avoid friction and shearing  - Provide appropriate hygiene as needed including keeping skin clean and dry  - Evaluate need for skin moisturizer/barrier cream  - Collaborate with interdisciplinary team (i e  Nutrition, Rehabilitation, etc )   - Patient/family teaching  Outcome: Progressing  Goal: Incision(s), wounds(s) or drain site(s) healing without S/S of infection  Description  INTERVENTIONS  - Assess and document risk factors for skin impairment   - Assess and document dressing, incision, wound bed, drain sites and surrounding tissue  - Consider nutrition services referral as needed  - Oral mucous membranes remain intact  - Provide patient/ family education  Outcome: Progressing  Goal: Oral mucous membranes remain intact  Description  INTERVENTIONS  - Assess oral mucosa and hygiene practices  - Implement preventative oral hygiene regimen  - Implement oral medicated treatments as ordered  - Initiate Nutrition services referral as needed  Outcome: Progressing

## 2020-01-16 NOTE — PLAN OF CARE
Problem: OCCUPATIONAL THERAPY ADULT  Goal: Performs self-care activities at highest level of function for planned discharge setting  See evaluation for individualized goals  Description  Treatment Interventions: ADL retraining, UE strengthening/ROM, Endurance training, Patient/family training, Equipment evaluation/education, Compensatory technique education, Energy conservation, Activityengagement          See flowsheet documentation for full assessment, interventions and recommendations  Outcome: Progressing  Note:   Limitation: Decreased ADL status, Decreased Safe judgement during ADL, Decreased cognition, Decreased endurance, Decreased self-care trans, Decreased high-level ADLs  Prognosis: Good  Assessment: Patient participated in Skilled OT session this date with interventions consisting of ADL re training with the use of correct body mechnaics, Energy Conservation techniques, Work simplification skills , deep breathing technique, safety awareness and fall prevention techniques, therapeutic exercise to: increase functional use of BUEs, increase BUE muscle strength ,  therapeutic activities to: increase activity tolerance, increase standing tolerance time with unilateral UE support to complete sink level ADLs, increase cardiovascular endurance , increase dynamic sit/ stand balance during functional activity , increase trunk control and increase OOB/ sitting tolerance   Patient agreeable to OT treatment session, upon arrival patient was found supine in bed  Taken off of bedrest this morning as per discussion w/ MD via [facility] Text and discontinued bedrest orders  ELBERT completed yesterday  RN cleared to see patient for OOB activity this afternoon - on 3L O2  Arrived to patient in room supine in bed with wife present  Agreeable to therapy session  Educated on importance of OOB activity to assist w/ safe DC planning  Patient completed bed mobility and functional transfers w/ S with RW and increased time  Surgical shoe sent to room and required max A to don LLE shoe - continued NWB LLE and good carryover throughout  S/p functional transfer, desat'd to 82% on 3L and required increased time to recover; required 4L O2 and 1 min with cues for deep breathing techniques both verbally and demonstration to improve sats to 95%  Returned to 3L O2 at end of session and remained ~92-94% - RN aware and stated she would be in the room shortly to assess  Patient with LLE propped up on bedside chair resting comfortably  After transfer, participated in minimal therapeutic activities and exercises with emphasis on BUE strengthening and activity tolerance including  strength, in hand manipulation and gross motor BUE movements to improve overall I w/ ADLs and functional mobility  Fair carryover of activities  At this time, patient very deconditioned w/ use of O2, limited activity tolerance and decreased I w/ ADLs  Patient requiring verbal cues for safety, verbal cues for pacing thru activity steps and frequent rest periods  Patient continues to be functioning below baseline level, occupational performance remains limited secondary to factors listed above and increased risk for falls and injury  From OT standpoint, recommendation at time of d/c would be Short Term Rehab          OT Discharge Recommendation: Short Term Rehab  OT - OK to Discharge: Yes(IF TO REHAB - IF HOME, REC 24/7 S/A AND HOME OT)

## 2020-01-16 NOTE — OCCUPATIONAL THERAPY NOTE
[address] Treatment Note     Patient Name: Nabor BHAGAT Date: 1/16/2020  Problem List  Principal Problem:    Acute respiratory failure with hypoxia ([facility] )  Active Problems:    Eczema    Complete heart block (HCC)    PAF (paroxysmal atrial fibrillation) (HCC)    NICOLASA (obstructive sleep apnea)    Type 2 diabetes mellitus with chronic kidney disease, without long-term current use of insulin (HCC)    Essential hypertension    Acute renal failure superimposed on stage 3 chronic kidney disease (HCC)    Leukocytosis    Chronic diastolic (congestive) heart failure (HCC)    Elevated troponin I level    Bacteremia due to Gram-positive bacteria    Type 2 diabetes mellitus with diabetic neuropathy, without long-term current use of insulin ([facility] )    Pyogenic inflammation of bone ([facility] )          01/16/20 1402   Restrictions/Precautions   Weight Bearing Precautions Per Order Yes   LLE Weight Bearing Per Order NWB   Braces or Orthoses   (surgical shoe (delivered to room today) LLE)   Other Precautions WBS; Multiple lines;O2;Fall Risk;Pain  (3L O2)   Pain Assessment   Pain Assessment 0-10   Pain Score 3   Pain Type Chronic pain   Pain Location Back   Pain Orientation Lower   Pain Descriptors Aching   Pain Frequency Intermittent   Pain Onset Ongoing   Clinical Progression Not changed   Effect of Pain on Daily Activities limits ADLs, good adherence to WBS LLE   Patient's Stated Pain Goal No pain   Hospital Pain Intervention(s) Medication (See MAR); Repositioned; Ambulation/increased activity   ADL   Grooming Assistance 5  Supervision/Setup   UB Dressing Assistance 4  Minimal Assistance   LB Dressing Assistance 2  Maximal Assistance  (to don LLE surgical shoe)   [address]    (denied need)   Functional Standing Tolerance   Time 2 min x2 trials   Activity ther ex/act, ADLs and transfers   Comments with RW -- S level   Bed Mobility   Supine to Sit 5  Supervision   Additional items Assist x 1;HOB elevated; Bedrails; Increased time required;Verbal cues   Transfers   Sit to Stand 5  Supervision   Additional items Assist x 1; Increased time required;Verbal cues   Stand to Sit 5  Supervision   Additional items Assist x 1; Increased time required;Verbal cues   Stand pivot 5  Supervision   Additional items Assist x 1; Increased time required;Verbal cues  (RW)   Additional Comments desat'd w/ stand pivot to 82% on 3L -- did not recover in one min despite cues, bumped to 4L O2 and able to recover to 95% with additional cues  returned to 3L O2 and remained between 92-94% - RN aware  oob to chair at end of session, LLE propped on additional bedside chair  patient in no sign of distress  3L O2 at end of session and RN Sophia aware   Cognition   Overall Cognitive Status WFL   Arousal/Participation Cooperative   Attention Within functional limits   Orientation Level Oriented X4   Memory Decreased recall of precautions   Following Commands Follows one step commands without difficulty   Comments cooperative throughout, flat affect  initially resistant towards participating in therapy -- increased time and education patient agreeable  wife present in beginning of session  Activity Tolerance   Activity Tolerance Patient limited by pain; Patient limited by fatigue   Medical Staff Made Aware ok to see per MARGAUX Martell - made aware at end of session   Assessment   Assessment Patient participated in Skilled OT session this date with interventions consisting of ADL re training with the use of correct body mechnaics, Energy Conservation techniques, Work simplification skills , deep breathing technique, safety awareness and fall prevention techniques, therapeutic exercise to: increase functional use of BUEs, increase BUE muscle strength ,  therapeutic activities to: increase activity tolerance, increase standing tolerance time with unilateral UE support to complete sink level ADLs, increase cardiovascular endurance , increase dynamic sit/ stand balance during functional activity , increase trunk control and increase OOB/ sitting tolerance   Patient agreeable to OT treatment session, upon arrival patient was found supine in bed  Taken off of bedrest this morning as per discussion w/ MD via [facility]ly[facility] Text and discontinued bedrest orders  ELBERT completed yesterday  RN cleared to see patient for OOB activity this afternoon - on 3L O2  Arrived to patient in room supine in bed with wife present  Agreeable to therapy session  Educated on importance of OOB activity to assist w/ safe DC planning  Patient completed bed mobility and functional transfers w/ S with RW and increased time  Surgical shoe sent to room and required max A to don LLE shoe - continued NWB LLE and good carryover throughout  S/p functional transfer, desat'd to 82% on 3L and required increased time to recover; required 4L O2 and 1 min with cues for deep breathing techniques both verbally and demonstration to improve sats to 95%  Returned to 3L O2 at end of session and remained ~92-94% - RN aware and stated she would be in the room shortly to assess  Patient with LLE propped up on bedside chair resting comfortably  After transfer, participated in minimal therapeutic activities and exercises with emphasis on BUE strengthening and activity tolerance including  strength, in hand manipulation and gross motor BUE movements to improve overall I w/ ADLs and functional mobility  Fair carryover of activities  At this time, patient very deconditioned w/ use of O2, limited activity tolerance and decreased I w/ ADLs  Patient requiring verbal cues for safety, verbal cues for pacing thru activity steps and frequent rest periods  Patient continues to be functioning below baseline level, occupational performance remains limited secondary to factors listed above and increased risk for falls and injury  From OT standpoint, recommendation at time of d/c would be Short Term Rehab        Plan   Goal Expiration Date 01/19/20   OT Treatment Day 2   OT Frequency 3-5x/wk   Recommendation   OT Discharge Recommendation Short Term Rehab   Equipment Recommended Bedside commode   OT - OK to Discharge Yes  (IF TO REHAB - IF HOME, REC 24/7 S/A AND HOME OT)         Cierra Herrera MS, OTR/L

## 2020-01-16 NOTE — SOCIAL WORK
Rec a message from spouse asking for update and a message was left with update on her phone  Met with pt and gave him update and asked pt to make sure spouse has update  Waiting on co-pay for IV Abx and w/c  Rec a message from spouse asking for [facility], knee walker and home 02  MD aware and home 02 study will be done once pt is medical stable for discharge  [facility] reports the IV abx will cost $50 23 plus $175 00 per week $225 23 with total cost $675 90 OOP cost, but they will need 50% prior to delivery of IV abx $325 00  Sent message to ask how spouse pays for the IV abx  Gave update to spouse  [facility] will reach out to spouse for payment prior to discharge for IV abx

## 2020-01-16 NOTE — OCCUPATIONAL THERAPY NOTE
Occupational Therapy Cancellation Note    Patient Name: Edgardo Arce  QCRNT'S Date: 1/16/2020      RECEIVED ORDERS + REVIEWED CHART  SPOKE WITH NRSG  ATTEMPTED TO SEE PATIENT THIS MORNING, PATIENT CURRENTLY REFUSING ALL ACTIVITY AT THIS TIME AS "HE IS VERY TIRED AND WANTS TO SLEEP"  EXTENSIVELY EDUCATED PATIENT AND WIFE PRESENT ON IMPORTANCE OF OOB ACTIVITY TO IMPROVE STRENGTH, ACTIVITY TOLERANCE AND OVERALL INDEPENDENCE/PARTICIPATION WITH THERAPY TO ASSIST W/ SAFE DC PLANNING  CONTINUING TO REFUSE THERAPY AT THIS TIME  OT WILL CHECK BACK ON PATIENT THIS AFTERNOON  CONTINUE TO FOLLOW AND TREAT AS APPROPRIATE        Dioni Ellison MS, OTR/L

## 2020-01-16 NOTE — PROGRESS NOTES
Progress Note - Infectious Disease   Carlitos Titus 75 y o  male MRN: [id number]  Unit/Bed#: E4 -01 Encounter: [id number]      Impression/Plan:  1  Sepsis   POA   Fever and leukocytosis   Likely secondary to MSSA bacteremia with left hallux wound and underlying osteopmyelitis as presumed source  No other clear source appreciated  Despite being systemically ill he has been hemodynamically stable  Today he is afebrile and his WBC count continues trending down  Repeat blood cultures were negative after 5 days  TTE and ELBERT were negative for valvlular vegetation and pacemaker lead abnormalities  -antibiotic as below  -monitor CBC and BMP  -monitor vitals  -supportive care     2  Janace Zak in both sets of patient's initial blood cultures  Suspect patient's left great toe ulcer was source   Patient does have an implanted pacemaker  TTE and ELBERT were negative  Repeat blood cultures were clear after 5 days  Patient is currently receiving IV cefazolin and is tolerating without difficulty  He will complete 4 weeks of IV antibiotic treatment, through 2/6/2020   -continue IV cefazolin through 2/6/2020 for 4 weeks of antibiotic treatment  -remove PICC after patient's final dose of IV antibiotics on 2/6/2020  -weekly CBCD and BMP while on IV antibiotics  -monitor vitals  -follow up in the outpatient ID office on 1/28/2020 at [address] [place] [address] with Yulia ROSA  -antibiotic and lab scripts dropped off in patient chart on 1/15/2020      3  Left hallux ulceration   With osteomyelitis as his wound does probe to bone and an x-ray of the left foot was suggestive of bony erosion at the 1st proximal phalanx   Purulence noted during initial podiatry wound exam, concern for possible infectious tenosynovitis   I suspect this was the source of patient's bacteremia above  Tarry Shanks is now status post amputation of hallux for presumed surgical cure of the ulcer and osteomyelitis  Intraop wound cultures showed MSSA  He had additional I&D and removal and sesamoid bone on 1/12/2020    -serial left foot exams  -follow up intraop wound cultures  -local wound care per Podiatry  -continue close follow-up with Podiatry     4  Acute hypoxic respiratory failure   Suspect this is secondary to pulmonary edema and diastolic heart failure   BNP and troponin were elevated upon admission  Chest x-ray and CT of the chest did not show consolidations indicative of a bacterial process  Patient did initially require high-flow O2 but is now stable on nasal cannula O2  Patient with low hemoglobin which may also be contributing to his SOB  Iron panel being assessed  He may receive PRBC transfusion   -follow up iron panel  -PRBC transfusion per cardiology/[facility]  -diuresis per Cardiology  -monitor vitals  -monitor respiratory status  -continue close follow-up with Cardiology     5  Type 2 diabetes mellitus with neuropathy   Patient's last hemoglobin A1c was 6 4% on 01/07/2020   This is risk factor for wounds and infection   Recommend tight glycemic control for overall health, especially in the setting of wound infection   -blood glucose management per primary service     6  Chronic diastolic heart failure   In setting of aortic and mitral valve stenosis and complete heart block   He has a [employer] dual chamber pacemaker in place   Now with Staph aureus bacteremia  TTE and ELBERT were negative  He is following closely with Cardiology   -continue follow-up with Cardiology     7  Paroxysmal AFib   On Eliquis  Patient is stable for discharge from ID standpoint  Above plan was discussed in detail with patient and his wife at the bedside  Antibiotic and lab scripts dropped off in patient chart on 1/15/2020  Antibiotics:  Cefazolin 7  Antibiotics 10    Subjective:  Patient has no fever, chills, sweats, shakes; no nausea, vomiting, abdominal pain, diarrhea, or dysuria; no cough, shortness of breath, or chest pain  No new symptoms      Objective:  Vitals:  Temp:  [97 4 °F (36 3 °C)-98 6 °F (37 °C)] 98 5 °F (36 9 °C)  HR:  [70-86] 85  Resp:  [19-26] 20  BP: (125-167)/(62-73) 154/69  SpO2:  [94 %-100 %] 94 %  Temp (24hrs), Av 1 °F (36 7 °C), Min:97 4 °F (36 3 °C), Max:98 6 °F (37 °C)  Current: Temperature: 98 5 °F (36 9 °C)    Physical Exam:   General Appearance:  Alert, interactive, nontoxic, no acute distress  Throat: Oropharynx moist without lesions  Lungs:   Clear to auscultation bilaterally; no wheezes, rhonchi or rales; respirations unlabored   Heart:  RRR; no murmur, rub or gallop   Abdomen:   Soft, non-tender, non-distended, positive bowel sounds  Extremities: No clubbing or cyanosis, no edema   Skin: No new rashes, lesions, or draining wounds noted on exposed skin       Labs, Imaging, & Other studies:   All pertinent labs and imaging studies were personally reviewed  Results from last 7 days   Lab Units 20  0453 01/15/20  0526 20  0444   WBC Thousand/uL 13 37* 16 24* 15 69*   HEMOGLOBIN g/dL 7 4* 7 8* 7 4*   PLATELETS Thousands/uL 458* 481* 426*     Results from last 7 days   Lab Units 20  0453   POTASSIUM mmol/L 3 8   CHLORIDE mmol/L 101   CO2 mmol/L 22   BUN mg/dL 89*   CREATININE mg/dL 2 00*   EGFR ml/min/1 73sq m 34   CALCIUM mg/dL 8 5

## 2020-01-16 NOTE — PROGRESS NOTES
Progress Note - Podiatry  Shira Sanders 75 y o  male MRN: [id number]  Unit/Bed#: E4 -01 Encounter: [id number]    Assessment:  1  S/p I&D with removal of sesamoid on 01/12/20 and s/p left hallux amputation on 01/08/20 due to underlying clinical OM, abscess, and possible infectious tenosynovitis   2  Sepsis - POA  3  Bacteremia likely secondary to #1  4  DM type 2    Plan:  - Left hallux incision site appears to be stable without any acute signs of infection noted  Dressed with betadine paint and DSD    - Continue NWB to the LLE  - PT/OT eval to make sure pt is safe with NWB to LLE at home   - Abx as per ID recommendations   - rest of care per primary service     Subjective/Objective   Chief Complaint:   Chief Complaint   Patient presents with    Fatigue     Pt with multiple complaints: sore throat, diarrhea, fatigue, fevers, tylenol at 0930  -ill contacts  Subjective: 75 y o  y/o male was seen and evaluated at bedside  Patient denies any acute events overnight  Patient complaints of SOB  Blood pressure 149/70, pulse 75, temperature 98 5 °F (36 9 °C), temperature source Temporal, resp  rate 20, weight 100 kg (221 lb 6 4 oz), SpO2 94 %  ,Body mass index is 32 06 kg/m²  Invasive Devices     Peripherally Inserted Central Catheter Line            PICC Line 51/21/15 Right Basilic 2 days          Peripheral Intravenous Line            Peripheral IV 01/16/20 Left Forearm less than 1 day                Physical Exam:   General: Alert, cooperative and no distress  Lungs: Non labored breathing  Heart: Positive S1, S2  Abdomen: Soft, non-tender  Extremity: Left foot incision site appears stable with intact sutures  There is edema and erythema noted around the incision site and forefoot which consistent post-operatively  No clinical sings of infection noted  No calf swelling or pain on left  NVS and MSK at baseline         1/16        Lab, Imaging and other studies:   CBC:   Lab Results   Component Value Date WBC 13 37 (H) 01/16/2020    HGB 7 4 (L) 01/16/2020    HCT 23 1 (L) 01/16/2020    MCV 92 01/16/2020     (H) 01/16/2020    MCH 29 5 01/16/2020    MCHC 32 0 01/16/2020    RDW 14 3 01/16/2020    MPV 9 0 01/16/2020   , CMP:   Lab Results   Component Value Date    SODIUM 134 (L) 01/16/2020    K 3 8 01/16/2020     01/16/2020    CO2 22 01/16/2020    BUN 89 (H) 01/16/2020    CREATININE 2 00 (H) 01/16/2020    CALCIUM 8 5 01/16/2020    EGFR 34 01/16/2020       Imaging: I have personally reviewed pertinent films in PACS  EKG, Pathology, and Other Studies: I have personally reviewed pertinent reports

## 2020-01-16 NOTE — PROGRESS NOTES
Progress Note - Cardiology   Huber Patel 75 y o  male MRN: [id number]  Unit/Bed#: E4 -01 Encounter: [id number]        Problem List:  Principal Problem:    Acute respiratory failure with hypoxia ([address] )  Active Problems:    Eczema    Complete heart block (HCC)    PAF (paroxysmal atrial fibrillation) (HCC)    NICOLASA (obstructive sleep apnea)    Type 2 diabetes mellitus with chronic kidney disease, without long-term current use of insulin (HCC)    Essential hypertension    Acute renal failure superimposed on stage 3 chronic kidney disease (HCC)    Leukocytosis    Elevated troponin I level    Bacteremia due to Gram-positive bacteria    Type 2 diabetes mellitus with diabetic neuropathy, without long-term current use of insulin (HCC)    Pyogenic inflammation of bone (HCC)    Chronic diastolic (congestive) heart failure (HCC)      Asessment:  1  Acute hypoxic respiratory failure:              -primary reason for admission              -suspected PNA +/-mild CHF  2  Chronic diastolic congestive heart failure:              -baseline weight:  215 lb (as OP 12/3/19)              -OP diuretic:  Lasix 40 b i d   3  Moderate aortic and mitral stenosis on echo 05/19  4  Complete heart block, s/p [employer] dual-chamber PPM  5  Hypertension   6  Dyslipidemia  7  Acute on chronic kidney injury:              -creatinine 2 08 on admission              -as low as 1 1 July of 2019  8  Elevated troponin, 0 22 on admission  9  Type 2 diabetes mellitus  10  Paroxysmal atrial fibrillation              -stroke prophylaxis:  Eliquis              -rate control:  Lopressor 50 b i d   11  Hypoalbuminemia, 2 6  12  Acute osteomyelitis of left foot      Plan/ Discussion:  1  Creatinine stable (2 06 --> 2 00 today) however he is still hypoxic and overall does not feel well  Continue IV Lasix 80 b i d  2  Hemoglobin continues to be low (7 4 --> 7 8 --> 7 4 today) which may be contributing to his dyspnea    Will confer with primary team for possibility of transfusing a unit of blood  Will add on iron panel to this morning's labs    3  Continue amlodipine, Eliquis, Lopressor, statin  Antibiotics per Infectious Disease  4  Encourage patient to try and work with physical therapy though he is rather reluctant    5  Try to wean off oxygen today      Subjective:  Overall does not feel well today  Does not have any specific physical complaints but is tired of his nonweightbearing status and wants to leave the hospital soon  Vitals:  Vitals:    01/15/20 0600 01/16/20 0600   Weight: 101 kg (221 lb 12 5 oz) 100 kg (221 lb 6 4 oz)   ,  Vitals:    01/15/20 2300 01/16/20 0600 01/16/20 0741 01/16/20 0855   BP: 145/62  154/69 149/70   BP Location: Left arm  Left arm    Pulse: 70  85 75   Resp: 20  20    Temp: 98 °F (36 7 °C)  98 5 °F (36 9 °C)    TempSrc: Temporal  Temporal    SpO2: 95%  94%    Weight:  100 kg (221 lb 6 4 oz)         Exam:  General:  Alert awake and oriented  Sitting on the edge of the bed comfortably  Not in any acute distress  Heart:  Regular by physical exam   Respiratory effort:  Breathing comfortably on 2 5 L nasal cannula     Lungs:  Fairly clear bilaterally with some decreased breath sounds at bases     Lower Limbs:  No edema    Left lower extremity remains dressed and wrapped             Medications:    Current Facility-Administered Medications:     acetaminophen (TYLENOL) tablet 650 mg, 650 mg, Oral, Q6H PRN, Melissa Ambron, DO, 650 mg at 01/15/20 1608    allopurinol (ZYLOPRIM) tablet 300 mg, 300 mg, Oral, QAM, Milly Garay DPM, 300 mg at 01/16/20 0806    amLODIPine (NORVASC) tablet 10 mg, 10 mg, Oral, Daily, Milly Garay DPM, 10 mg at 01/16/20 0806    apixaban (ELIQUIS) tablet 5 mg, 5 mg, Oral, BID, Melissa Ambron, DO, 5 mg at 01/16/20 0806    ceFAZolin (ANCEF) IVPB (premix) 2,000 mg, 2,000 mg, Intravenous, Q8H, Milly Garay DPM, Stopped at 01/16/20 0409    furosemide (LASIX) injection 80 mg, 80 mg, Intravenous, BID (diuretic), Gauri Barton MD, 80 mg at 01/16/20 0805    guaiFENesin (MUCINEX) 12 hr tablet 600 mg, 600 mg, Oral, BID, Fausto Lacjesus alberto, DPM, 600 mg at 01/16/20 0806    insulin glargine (LANTUS) subcutaneous injection 22 Units 0 22 mL, 22 Units, Subcutaneous, HS, Melissa Ambron, DO, 22 Units at 01/15/20 2100    insulin lispro (HumaLOG) 100 units/mL subcutaneous injection 2-12 Units, 2-12 Units, Subcutaneous, TID AC, 2 Units at 01/16/20 0811 **AND** Fingerstick Glucose (POCT), , , TID AC, Fausto Lacy, DPM    ipratropium-albuterol (DUO-NEB) 0 5-2 5 mg/3 mL inhalation solution 3 mL, 3 mL, Nebulization, Q6H PRN, Fausto Reyesy, DPM, 3 mL at 01/07/20 2025    LORazepam (ATIVAN) tablet 0 5 mg, 0 5 mg, Oral, Q8H PRN, Melissa Lauraron, DO, 0 5 mg at 01/15/20 0428    melatonin tablet 6 mg, 6 mg, Oral, HS, Fausto Lacy, DPM, 6 mg at 01/15/20 2100    metoprolol tartrate (LOPRESSOR) tablet 50 mg, 50 mg, Oral, Q12H [address], Fausto Lacy, DPM, 50 mg at 01/16/20 0806    oxyCODONE (ROXICODONE) IR tablet 5 mg, 5 mg, Oral, Q4H PRN, Fausto Lacy, DPM, 5 mg at 01/12/20 1709    pravastatin (PRAVACHOL) tablet 40 mg, 40 mg, Oral, Daily With Sapna Pool Cha DPM, 40 mg at 01/15/20 1609    tamsulosin (FLOMAX) capsule 0 4 mg, 0 4 mg, Oral, Daily With Jermaine Espinoza DPM, 0 4 mg at 01/15/20 1609      Labs/Data:        Results from last 7 days   Lab Units 01/16/20  0453 01/15/20  0526 01/14/20  0444   WBC Thousand/uL 13 37* 16 24* 15 69*   HEMOGLOBIN g/dL 7 4* 7 8* 7 4*   HEMATOCRIT % 23 1* 23 9* 22 4*   PLATELETS Thousands/uL 458* 481* 426*     Results from last 7 days   Lab Units 01/16/20  0453 01/15/20  0526 01/14/20  0444   POTASSIUM mmol/L 3 8 3 6 3 7   CHLORIDE mmol/L 101 98* 97*   CO2 mmol/L 22 24 22   BUN mg/dL 89* 86* 86*

## 2020-01-16 NOTE — PLAN OF CARE
Problem: Potential for Falls  Goal: Patient will remain free of falls  Description  INTERVENTIONS:  - Assess patient frequently for physical needs  -  Identify cognitive and physical deficits and behaviors that affect risk of falls    -  [place] fall precautions as indicated by assessment   - Educate patient/family on patient safety including physical limitations  - Instruct patient to call for assistance with activity based on assessment  - Modify environment to reduce risk of injury  - Consider OT/PT consult to assist with strengthening/mobility  Outcome: Progressing     Problem: PAIN - ADULT  Goal: Verbalizes/displays adequate comfort level or baseline comfort level  Description  Interventions:  - Encourage patient to monitor pain and request assistance  - Assess pain using appropriate pain scale  - Administer analgesics based on type and severity of pain and evaluate response  - Implement non-pharmacological measures as appropriate and evaluate response  - Consider cultural and social influences on pain and pain management  - Notify physician/advanced practitioner if interventions unsuccessful or patient reports new pain  Outcome: Progressing     Problem: INFECTION - ADULT  Goal: Absence or prevention of progression during hospitalization  Description  INTERVENTIONS:  - Assess and monitor for signs and symptoms of infection  - Monitor lab/diagnostic results  - Monitor all insertion sites, i e  indwelling lines, tubes, and drains  - Administer medications as ordered  - Instruct and encourage patient and family to use good hand hygiene technique  - Identify and instruct in appropriate isolation precautions for identified infection/condition   Outcome: Progressing     Problem: SAFETY ADULT  Goal: Maintain or return to baseline ADL function  Description  INTERVENTIONS:  -  Assess patient's ability to carry out ADLs; assess patient's baseline for ADL function and identify physical deficits which impact ability to perform ADLs (bathing, care of mouth/teeth, toileting, grooming, dressing, etc )  - Assess/evaluate cause of self-care deficits   - Assess range of motion  - Assess patient's mobility; develop plan if impaired  - Assess patient's need for assistive devices and provide as appropriate  - Encourage maximum independence but intervene and supervise when necessary  - Involve family in performance of ADLs  - Assess for home care needs following discharge   - Consider OT consult to assist with ADL evaluation and planning for discharge  - Provide patient education as appropriate  Outcome: Progressing  Goal: Maintain or return mobility status to optimal level  Description  INTERVENTIONS:  - Assess patient's baseline mobility status (ambulation, transfers, stairs, etc )    - Identify cognitive and physical deficits and behaviors that affect mobility  - Identify mobility aids required to assist with transfers and/or ambulation (gait belt, sit-to-stand, lift, walker, cane, etc )  - [place] fall precautions as indicated by assessment  - Record patient progress and toleration of activity level on Mobility SBAR; progress patient to next Phase/Stage  - Instruct patient to call for assistance with activity based on assessment  - Consider rehabilitation consult to assist with strengthening/weightbearing, etc   Outcome: Progressing     Problem: DISCHARGE PLANNING  Goal: Discharge to home or other facility with appropriate resources  Description  INTERVENTIONS:  - Identify barriers to discharge w/patient and caregiver  - Arrange for needed discharge resources and transportation as appropriate  - Identify discharge learning needs (meds, wound care, etc )  - Arrange for interpretive services to assist at discharge as needed  - Refer to Case Management Department for coordinating discharge planning if the patient needs post-hospital services based on physician/advanced practitioner order or complex needs related to functional status, cognitive ability, or social support system  Outcome: Progressing     Problem: Knowledge Deficit  Goal: Patient/family/caregiver demonstrates understanding of disease process, treatment plan, medications, and discharge instructions  Description  Complete learning assessment and assess knowledge base    Interventions:  - Provide teaching at level of understanding  - Provide teaching via preferred learning methods  Outcome: Progressing     Problem: CARDIOVASCULAR - ADULT  Goal: Maintains optimal cardiac output and hemodynamic stability  Description  INTERVENTIONS:  - Monitor I/O, vital signs and rhythm  - Monitor for S/S and trends of decreased cardiac output  - Administer and titrate ordered vasoactive medications to optimize hemodynamic stability  - Assess quality of pulses, skin color and temperature  - Assess for signs of decreased coronary artery perfusion  - Instruct patient to report change in severity of symptoms  Outcome: Progressing  Goal: Absence of cardiac dysrhythmias or at baseline rhythm  Description  INTERVENTIONS:  - Continuous cardiac monitoring, vital signs, obtain 12 lead EKG if ordered  - Administer antiarrhythmic and heart rate control medications as ordered  - Monitor electrolytes and administer replacement therapy as ordered  Outcome: Progressing     Problem: RESPIRATORY - ADULT  Goal: Achieves optimal ventilation and oxygenation  Description  INTERVENTIONS:  - Assess for changes in respiratory status  - Assess for changes in mentation and behavior  - Position to facilitate oxygenation and minimize respiratory effort  - Oxygen administered by appropriate delivery if ordered  - Initiate smoking cessation education as indicated  - Encourage broncho-pulmonary hygiene including cough, deep breathe, Incentive Spirometry  - Assess the need for suctioning and aspirate as needed  - Assess and instruct to report SOB or any respiratory difficulty  - Respiratory Therapy support as indicated  Outcome: Progressing     Problem: Prexisting or High Potential for Compromised Skin Integrity  Goal: Skin integrity is maintained or improved  Description  INTERVENTIONS:  - Identify patients at risk for skin breakdown  - Assess and monitor skin integrity  - Assess and monitor nutrition and hydration status  - Monitor labs   - Assess for incontinence   - Turn and reposition patient  - Assist with mobility/ambulation  - Relieve pressure over bony prominences  - Avoid friction and shearing  - Provide appropriate hygiene as needed including keeping skin clean and dry  - Evaluate need for skin moisturizer/barrier cream  - Collaborate with interdisciplinary team   - Patient/family teaching  - Consider wound care consult   Outcome: Progressing     Problem: Nutrition/Hydration-ADULT  Goal: Nutrient/Hydration intake appropriate for improving, restoring or maintaining nutritional needs  Description  Monitor and assess patient's nutrition/hydration status for malnutrition  Collaborate with interdisciplinary team and initiate plan and interventions as ordered  Monitor patient's weight and dietary intake as ordered or per policy  Utilize nutrition screening tool and intervene as necessary  Determine patient's food preferences and provide high-protein, high-caloric foods as appropriate       INTERVENTIONS:  - Monitor oral intake, urinary output, labs, and treatment plans  - Assess nutrition and hydration status and recommend course of action  - Evaluate amount of meals eaten  - Assist patient with eating if necessary   - Allow adequate time for meals  - Recommend/ encourage appropriate diets, oral nutritional supplements, and vitamin/mineral supplements  - Order, calculate, and assess calorie counts as needed  - Recommend, monitor, and adjust tube feedings and TPN/PPN based on assessed needs  - Assess need for intravenous fluids  - Provide specific nutrition/hydration education as appropriate  - Include patient/family/caregiver in decisions related to nutrition  Outcome: Progressing     Problem: METABOLIC, FLUID AND ELECTROLYTES - ADULT  Goal: Glucose maintained within target range  Description  INTERVENTIONS:  - Monitor Blood Glucose as ordered  - Assess for signs and symptoms of hyperglycemia and hypoglycemia  - Administer ordered medications to maintain glucose within target range  - Assess nutritional intake and initiate nutrition service referral as needed  Outcome: Progressing     Problem: SKIN/TISSUE INTEGRITY - ADULT  Goal: Skin integrity remains intact  Description  INTERVENTIONS  - Identify patients at risk for skin breakdown  - Assess and monitor skin integrity  - Assess and monitor nutrition and hydration status  - Monitor labs (i e  albumin)  - Assess for incontinence   - Turn and reposition patient  - Assist with mobility/ambulation  - Relieve pressure over bony prominences  - Avoid friction and shearing  - Provide appropriate hygiene as needed including keeping skin clean and dry  - Evaluate need for skin moisturizer/barrier cream  - Collaborate with interdisciplinary team (i e  Nutrition, Rehabilitation, etc )   - Patient/family teaching  Outcome: Progressing  Goal: Incision(s), wounds(s) or drain site(s) healing without S/S of infection  Description  INTERVENTIONS  - Assess and document risk factors for skin impairment   - Assess and document dressing, incision, wound bed, drain sites and surrounding tissue  - Consider nutrition services referral as needed  - Oral mucous membranes remain intact  - Provide patient/ family education  Outcome: Progressing  Goal: Oral mucous membranes remain intact  Description  INTERVENTIONS  - Assess oral mucosa and hygiene practices  - Implement preventative oral hygiene regimen  - Implement oral medicated treatments as ordered  - Initiate Nutrition services referral as needed  Outcome: Progressing

## 2020-01-16 NOTE — PROGRESS NOTES
[address] Internal Medicine Progress Note  Patient: Mena Uribe 75 y o  male   MRN: [id number]  PCP: Allan Lux DO  Unit/Bed#: E4 -01 Encounter: [id number]  Date Of Visit: 01/16/20      Assessment/plan  1  Severe sepsis due to MSSA bacteremia from left hallux wound- appreciate ID recommendations  Continue ancef  S/p picc  S/p ELBERT that was negative for vegetation  He is s/p Left hallux amputation on 1/8/2020 and s/p I and D with removal of sesmoid bone on 1/12/2020  Appreciate podiatry recommendations  He will continue ancef through 2/6/20  He will need repeat blood cultures 2 weeks after completion of antibiotic treatment  He will also need weekly cbc and creatinine while on iv antibiotics       2  Acute respiratory failure with hypoxia due to acute diastolic chf  Continue diuresis  Continue to wean oxygen as tolerated     3  Acute diastolic congestive heart failure  Appreciate cardiology recommendations  Continue IV lasix  increased to 80mg IV bid  Check bmp in am       4  Acute renal failure/ckd3- baselines creatinine is 1 1-1 4  Currently creatinine is 2 0  Arb is being held  Have accepted a higher baseline as trying to treat pts hypervolemia  Will check bmp in am       5  Type 2 diabetes with hyperglycemia- continue lantus to 22 units  Will continue insulin sliding scale  Pt will mostly likely not continue metformin at discharge       6  Obstructive sleep apnea     7  Paroxysmal atrial fibrillation- continue eliquis  Continue metoprolol     8  Hyponatremia- continues to improve        9 History of complete heart block, status post pacemaker     10  Normocytic anemia- likely due to acute blood loss anemia from recent procedures  Hemoglobin is 7 4  Will transfuse 1 unit of prbc  Will check cbc in am       11  anxiety- continue ativan prn       Subjective:   Pt seen and examined  Pt had a panic attack earlier today  He is better after he had ativan  No f/c no cp he still has sob  No n/v/d no abd pain  Objective:     Vitals: Blood pressure 149/70, pulse 75, temperature 98 °F (36 7 °C), resp  rate 20, weight 100 kg (221 lb 6 4 oz), SpO2 94 %  ,Body mass index is 32 06 kg/m²  Lab, Imaging and other studies:  Results from last 7 days   Lab Units 01/16/20  0453   WBC Thousand/uL 13 37*   HEMOGLOBIN g/dL 7 4*   HEMATOCRIT % 23 1*   PLATELETS Thousands/uL 458*     Results from last 7 days   Lab Units 01/16/20  0453   POTASSIUM mmol/L 3 8   CHLORIDE mmol/L 101   CO2 mmol/L 22   BUN mg/dL 89*   CREATININE mg/dL 2 00*   CALCIUM mg/dL 8 5         Lab Results   Component Value Date    BLOODCX No Growth After 5 Days  01/09/2020    BLOODCX No Growth After 5 Days  01/09/2020    BLOODCX Staphylococcus aureus (A) 01/07/2020    BLOODCX Staphylococcus aureus (A) 01/07/2020    WOUNDCULT 4+ Growth of Escherichia coli 07/19/2017    WOUNDCULT 1+ Growth of Mixed Skin Jem 07/19/2017    WOUNDCULT Growth in Broth culture only Citrobacter freundii 05/02/2017    SPUTUMCULTUR 2+ Growth of  05/03/2019    SPUTUMCULTUR  05/03/2019     Commensal respiratory jem only; No significant growth of Staph aureus/MRSA or Pseudomonas aeruginosa       Scheduled Meds:   Current Facility-Administered Medications:  acetaminophen 650 mg Oral Q6H PRN Melissa Ambron, DO    allopurinol 300 mg Oral QAM [facility] Friend, DPM    amLODIPine 10 mg Oral Daily [place] Friend, DP    apixaban 5 mg Oral BID Melissa Ambron, DO    cefazolin 2,000 mg Intravenous Q8H [place] Friend, DPM Last Rate: 2,000 mg (01/16/20 0955)   ferrous gluconate 324 mg Oral BID AC Melissa Ambron, DO    furosemide 80 mg Intravenous BID (diuretic) Vi Akins MD    guaiFENesin 600 mg Oral BID [facility], D[facility]    insulin glargine 22 Units Subcutaneous HS Melissa Ambron, DO    insulin lispro 2-12 Units Subcutaneous TID AC [place] Friend, DPM    ipratropium-albuterol 3 mL Nebulization Q6H PRN [facility] [place], DPM    LORazepam 0 5 mg Oral Q8H PRN Melissa Ambron, DO    melatonin 6 mg Oral HS [place] Friend, DPM    metoprolol tartrate 50 mg Oral Q12H [facility] Delorise Dial, DPM    oxyCODONE 5 mg Oral Q4H PRN Delorise Dial, DPM    pravastatin 40 mg Oral Daily With Keyla Limes Kasey, DPM    tamsulosin 0 4 mg Oral Daily With Keyla Limes Kasey, DPM      Continuous Infusions:    PRN Meds:   acetaminophen    ipratropium-albuterol    LORazepam    oxyCODONE      Physical exam:  Physical Exam   Constitutional: He is oriented to person, place, and time  No distress  HENT:   Head: Normocephalic and atraumatic  Eyes: Pupils are equal, round, and reactive to light  EOM are normal    Neck: Normal range of motion  Neck supple  Cardiovascular: Normal rate, regular rhythm and normal heart sounds  Pulmonary/Chest: Effort normal  No respiratory distress  Minimal crackles at bases bilateral   Abdominal: Soft  Bowel sounds are normal  He exhibits no distension  There is no tenderness  There is no guarding  Neurological: He is alert and oriented to person, place, and time  Skin: He is not diaphoretic       VTE Pharmacologic Prophylaxis:eliquis  VTE Mechanical Prophylaxis: sequential compression device    Counseling / Coordination of Care  Total floor / unit time spent today 20 minutes      Current Length of Stay: 9 day(s)    Current Patient Status: Inpatient       Code Status: Level 1 - Full Code

## 2020-01-16 NOTE — PLAN OF CARE
Problem: Potential for Falls  Goal: Patient will remain free of falls  Description  INTERVENTIONS:  - Assess patient frequently for physical needs  -  Identify cognitive and physical deficits and behaviors that affect risk of falls    -  [place] fall precautions as indicated by assessment   - Educate patient/family on patient safety including physical limitations  - Instruct patient to call for assistance with activity based on assessment  - Modify environment to reduce risk of injury  - Consider OT/PT consult to assist with strengthening/mobility  1/16/2020 0821 by Emiliana Varela RN  Outcome: Progressing  1/16/2020 0656 by Emiliana Varela RN  Outcome: Progressing     Problem: PAIN - ADULT  Goal: Verbalizes/displays adequate comfort level or baseline comfort level  Description  Interventions:  - Encourage patient to monitor pain and request assistance  - Assess pain using appropriate pain scale  - Administer analgesics based on type and severity of pain and evaluate response  - Implement non-pharmacological measures as appropriate and evaluate response  - Consider cultural and social influences on pain and pain management  - Notify physician/advanced practitioner if interventions unsuccessful or patient reports new pain  1/16/2020 0821 by Emiliana Varela RN  Outcome: Progressing  1/16/2020 0656 by Emiliana Varela RN  Outcome: Progressing     Problem: INFECTION - ADULT  Goal: Absence or prevention of progression during hospitalization  Description  INTERVENTIONS:  - Assess and monitor for signs and symptoms of infection  - Monitor lab/diagnostic results  - Monitor all insertion sites, i e  indwelling lines, tubes, and drains  - Administer medications as ordered  - Instruct and encourage patient and family to use good hand hygiene technique  - Identify and instruct in appropriate isolation precautions for identified infection/condition   1/16/2020 0821 by Emiliana Varela RN  Outcome: Progressing  1/16/2020 0656 by Rosemary Irwin RN  Outcome: Progressing     Problem: SAFETY ADULT  Goal: Maintain or return to baseline ADL function  Description  INTERVENTIONS:  -  Assess patient's ability to carry out ADLs; assess patient's baseline for ADL function and identify physical deficits which impact ability to perform ADLs (bathing, care of mouth/teeth, toileting, grooming, dressing, etc )  - Assess/evaluate cause of self-care deficits   - Assess range of motion  - Assess patient's mobility; develop plan if impaired  - Assess patient's need for assistive devices and provide as appropriate  - Encourage maximum independence but intervene and supervise when necessary  - Involve family in performance of ADLs  - Assess for home care needs following discharge   - Consider OT consult to assist with ADL evaluation and planning for discharge  - Provide patient education as appropriate  1/16/2020 0821 by Rosemary Irwin RN  Outcome: Progressing  1/16/2020 0656 by Rosemary Irwin RN  Outcome: Progressing  Goal: Maintain or return mobility status to optimal level  Description  INTERVENTIONS:  - Assess patient's baseline mobility status (ambulation, transfers, stairs, etc )    - Identify cognitive and physical deficits and behaviors that affect mobility  - Identify mobility aids required to assist with transfers and/or ambulation (gait belt, sit-to-stand, lift, walker, cane, etc )  - [place] fall precautions as indicated by assessment  - Record patient progress and toleration of activity level on Mobility SBAR; progress patient to next Phase/Stage  - Instruct patient to call for assistance with activity based on assessment  - Consider rehabilitation consult to assist with strengthening/weightbearing, etc   1/16/2020 0821 by Rosemary Irwin RN  Outcome: Progressing  1/16/2020 0656 by Rosemary Irwin RN  Outcome: Progressing     Problem: DISCHARGE PLANNING  Goal: Discharge to home or other facility with appropriate resources  Description  INTERVENTIONS:  - Identify barriers to discharge w/patient and caregiver  - Arrange for needed discharge resources and transportation as appropriate  - Identify discharge learning needs (meds, wound care, etc )  - Arrange for interpretive services to assist at discharge as needed  - Refer to Case Management Department for coordinating discharge planning if the patient needs post-hospital services based on physician/advanced practitioner order or complex needs related to functional status, cognitive ability, or social support system  1/16/2020 0821 by Ashu Montez RN  Outcome: Progressing  1/16/2020 0656 by Ashu Montez RN  Outcome: Progressing     Problem: Knowledge Deficit  Goal: Patient/family/caregiver demonstrates understanding of disease process, treatment plan, medications, and discharge instructions  Description  Complete learning assessment and assess knowledge base    Interventions:  - Provide teaching at level of understanding  - Provide teaching via preferred learning methods  1/16/2020 0821 by Ashu Montez RN  Outcome: Progressing  1/16/2020 0656 by Ashu Montez RN  Outcome: Progressing     Problem: CARDIOVASCULAR - ADULT  Goal: Maintains optimal cardiac output and hemodynamic stability  Description  INTERVENTIONS:  - Monitor I/O, vital signs and rhythm  - Monitor for S/S and trends of decreased cardiac output  - Administer and titrate ordered vasoactive medications to optimize hemodynamic stability  - Assess quality of pulses, skin color and temperature  - Assess for signs of decreased coronary artery perfusion  - Instruct patient to report change in severity of symptoms  1/16/2020 0821 by Ashu Montez RN  Outcome: Progressing  1/16/2020 0656 by Ashu Montez RN  Outcome: Progressing  Goal: Absence of cardiac dysrhythmias or at baseline rhythm  Description  INTERVENTIONS:  - Continuous cardiac monitoring, vital signs, obtain 12 lead EKG if ordered  - Administer antiarrhythmic and heart rate control medications as ordered  - Monitor electrolytes and administer replacement therapy as ordered  1/16/2020 1522 by Umesh Bean RN  Outcome: Progressing  1/16/2020 0656 by Umesh Bean RN  Outcome: Progressing     Problem: RESPIRATORY - ADULT  Goal: Achieves optimal ventilation and oxygenation  Description  INTERVENTIONS:  - Assess for changes in respiratory status  - Assess for changes in mentation and behavior  - Position to facilitate oxygenation and minimize respiratory effort  - Oxygen administered by appropriate delivery if ordered  - Initiate smoking cessation education as indicated  - Encourage broncho-pulmonary hygiene including cough, deep breathe, Incentive Spirometry  - Assess the need for suctioning and aspirate as needed  - Assess and instruct to report SOB or any respiratory difficulty  - Respiratory Therapy support as indicated  1/16/2020 0821 by Umesh Bean RN  Outcome: Progressing  1/16/2020 0656 by Umesh Bean RN  Outcome: Progressing     Problem: Prexisting or High Potential for Compromised Skin Integrity  Goal: Skin integrity is maintained or improved  Description  INTERVENTIONS:  - Identify patients at risk for skin breakdown  - Assess and monitor skin integrity  - Assess and monitor nutrition and hydration status  - Monitor labs   - Assess for incontinence   - Turn and reposition patient  - Assist with mobility/ambulation  - Relieve pressure over bony prominences  - Avoid friction and shearing  - Provide appropriate hygiene as needed including keeping skin clean and dry  - Evaluate need for skin moisturizer/barrier cream  - Collaborate with interdisciplinary team   - Patient/family teaching  - Consider wound care consult   1/16/2020 0821 by Umesh Bean RN  Outcome: Progressing  1/16/2020 0656 by Umesh Bean RN  Outcome: Progressing     Problem: Nutrition/Hydration-ADULT  Goal: Nutrient/Hydration intake appropriate for improving, restoring or maintaining nutritional needs  Description  Monitor and assess patient's nutrition/hydration status for malnutrition  Collaborate with interdisciplinary team and initiate plan and interventions as ordered  Monitor patient's weight and dietary intake as ordered or per policy  Utilize nutrition screening tool and intervene as necessary  Determine patient's food preferences and provide high-protein, high-caloric foods as appropriate       INTERVENTIONS:  - Monitor oral intake, urinary output, labs, and treatment plans  - Assess nutrition and hydration status and recommend course of action  - Evaluate amount of meals eaten  - Assist patient with eating if necessary   - Allow adequate time for meals  - Recommend/ encourage appropriate diets, oral nutritional supplements, and vitamin/mineral supplements  - Order, calculate, and assess calorie counts as needed  - Recommend, monitor, and adjust tube feedings and TPN/PPN based on assessed needs  - Assess need for intravenous fluids  - Provide specific nutrition/hydration education as appropriate  - Include patient/family/caregiver in decisions related to nutrition  1/16/2020 0821 by Mick Pinto RN  Outcome: Progressing  1/16/2020 0656 by Mick Pinto RN  Outcome: Progressing     Problem: METABOLIC, FLUID AND ELECTROLYTES - ADULT  Goal: Glucose maintained within target range  Description  INTERVENTIONS:  - Monitor Blood Glucose as ordered  - Assess for signs and symptoms of hyperglycemia and hypoglycemia  - Administer ordered medications to maintain glucose within target range  - Assess nutritional intake and initiate nutrition service referral as needed  1/16/2020 0821 by Mcik Pinto RN  Outcome: Progressing  1/16/2020 0656 by Mick Pinto RN  Outcome: Progressing     Problem: SKIN/TISSUE INTEGRITY - ADULT  Goal: Skin integrity remains intact  Description  INTERVENTIONS  - Identify patients at risk for skin breakdown  - Assess and monitor skin integrity  - Assess and monitor nutrition and hydration status  - Monitor labs (i e  albumin)  - Assess for incontinence   - Turn and reposition patient  - Assist with mobility/ambulation  - Relieve pressure over bony prominences  - Avoid friction and shearing  - Provide appropriate hygiene as needed including keeping skin clean and dry  - Evaluate need for skin moisturizer/barrier cream  - Collaborate with interdisciplinary team (i e  Nutrition, Rehabilitation, etc )   - Patient/family teaching  1/16/2020 9325 by Umesh Bean RN  Outcome: Progressing  1/16/2020 0656 by Umesh Bean RN  Outcome: Progressing  Goal: Incision(s), wounds(s) or drain site(s) healing without S/S of infection  Description  INTERVENTIONS  - Assess and document risk factors for skin impairment   - Assess and document dressing, incision, wound bed, drain sites and surrounding tissue  - Consider nutrition services referral as needed  - Oral mucous membranes remain intact  - Provide patient/ family education  1/16/2020 0821 by Umesh Bean RN  Outcome: Progressing  1/16/2020 0656 by Umesh Bean RN  Outcome: Progressing  Goal: Oral mucous membranes remain intact  Description  INTERVENTIONS  - Assess oral mucosa and hygiene practices  - Implement preventative oral hygiene regimen  - Implement oral medicated treatments as ordered  - Initiate Nutrition services referral as needed  1/16/2020 0821 by Umesh Bean RN  Outcome: Progressing  1/16/2020 0656 by Umesh Bean RN  Outcome: Progressing

## 2020-01-17 LAB
ABO GROUP BLD BPU: NORMAL
ANION GAP SERPL CALCULATED.3IONS-SCNC: 10 MMOL/L (ref 4–13)
BPU ID: NORMAL
BUN SERPL-MCNC: 87 MG/DL (ref 5–25)
CALCIUM SERPL-MCNC: 8.5 MG/DL (ref 8.3–10.1)
CHLORIDE SERPL-SCNC: 100 MMOL/L (ref 100–108)
CO2 SERPL-SCNC: 23 MMOL/L (ref 21–32)
CREAT SERPL-MCNC: 2.17 MG/DL (ref 0.6–1.3)
CROSSMATCH: NORMAL
ERYTHROCYTE [DISTWIDTH] IN BLOOD BY AUTOMATED COUNT: 14.4 % (ref 11.6–15.1)
GFR SERPL CREATININE-BSD FRML MDRD: 31 ML/MIN/1.73SQ M
GLUCOSE SERPL-MCNC: 101 MG/DL (ref 65–140)
GLUCOSE SERPL-MCNC: 177 MG/DL (ref 65–140)
GLUCOSE SERPL-MCNC: 216 MG/DL (ref 65–140)
GLUCOSE SERPL-MCNC: 221 MG/DL (ref 65–140)
GLUCOSE SERPL-MCNC: 97 MG/DL (ref 65–140)
HCT VFR BLD AUTO: 24.9 % (ref 36.5–49.3)
HGB BLD-MCNC: 8 G/DL (ref 12–17)
MCH RBC QN AUTO: 30 PG (ref 26.8–34.3)
MCHC RBC AUTO-ENTMCNC: 32.1 G/DL (ref 31.4–37.4)
MCV RBC AUTO: 93 FL (ref 82–98)
PLATELET # BLD AUTO: 477 THOUSANDS/UL (ref 149–390)
PMV BLD AUTO: 9.2 FL (ref 8.9–12.7)
POTASSIUM SERPL-SCNC: 3.5 MMOL/L (ref 3.5–5.3)
RBC # BLD AUTO: 2.67 MILLION/UL (ref 3.88–5.62)
SODIUM SERPL-SCNC: 133 MMOL/L (ref 136–145)
UNIT DISPENSE STATUS: NORMAL
UNIT PRODUCT CODE: NORMAL
UNIT RH: NORMAL
WBC # BLD AUTO: 14.88 THOUSAND/UL (ref 4.31–10.16)

## 2020-01-17 PROCEDURE — 80048 BASIC METABOLIC PNL TOTAL CA: CPT | Performed by: INTERNAL MEDICINE

## 2020-01-17 PROCEDURE — 94660 CPAP INITIATION&MGMT: CPT

## 2020-01-17 PROCEDURE — 99233 SBSQ HOSP IP/OBS HIGH 50: CPT | Performed by: INTERNAL MEDICINE

## 2020-01-17 PROCEDURE — 99232 SBSQ HOSP IP/OBS MODERATE 35: CPT | Performed by: INTERNAL MEDICINE

## 2020-01-17 PROCEDURE — 97535 SELF CARE MNGMENT TRAINING: CPT

## 2020-01-17 PROCEDURE — 82948 REAGENT STRIP/BLOOD GLUCOSE: CPT

## 2020-01-17 PROCEDURE — 97110 THERAPEUTIC EXERCISES: CPT

## 2020-01-17 PROCEDURE — 94760 N-INVAS EAR/PLS OXIMETRY 1: CPT

## 2020-01-17 PROCEDURE — 99232 SBSQ HOSP IP/OBS MODERATE 35: CPT

## 2020-01-17 PROCEDURE — 85027 COMPLETE CBC AUTOMATED: CPT | Performed by: INTERNAL MEDICINE

## 2020-01-17 PROCEDURE — 97530 THERAPEUTIC ACTIVITIES: CPT

## 2020-01-17 RX ADMIN — FERROUS GLUCONATE 324 MG: 324 TABLET ORAL at 17:14

## 2020-01-17 RX ADMIN — INSULIN GLARGINE 22 UNITS: 100 INJECTION, SOLUTION SUBCUTANEOUS at 21:39

## 2020-01-17 RX ADMIN — LORAZEPAM 0.5 MG: 1 TABLET ORAL at 14:02

## 2020-01-17 RX ADMIN — FERROUS GLUCONATE 324 MG: 324 TABLET ORAL at 06:44

## 2020-01-17 RX ADMIN — METOPROLOL TARTRATE 50 MG: 50 TABLET, FILM COATED ORAL at 21:39

## 2020-01-17 RX ADMIN — GUAIFENESIN 600 MG: 600 TABLET ORAL at 08:13

## 2020-01-17 RX ADMIN — PRAVASTATIN SODIUM 40 MG: 40 TABLET ORAL at 17:14

## 2020-01-17 RX ADMIN — CEFAZOLIN SODIUM 2000 MG: 2 SOLUTION INTRAVENOUS at 10:55

## 2020-01-17 RX ADMIN — FUROSEMIDE 80 MG: 10 INJECTION, SOLUTION INTRAMUSCULAR; INTRAVENOUS at 17:14

## 2020-01-17 RX ADMIN — CEFAZOLIN SODIUM 2000 MG: 2 SOLUTION INTRAVENOUS at 18:33

## 2020-01-17 RX ADMIN — ALLOPURINOL 300 MG: 100 TABLET ORAL at 08:13

## 2020-01-17 RX ADMIN — INSULIN LISPRO 4 UNITS: 100 INJECTION, SOLUTION INTRAVENOUS; SUBCUTANEOUS at 17:14

## 2020-01-17 RX ADMIN — INSULIN LISPRO 4 UNITS: 100 INJECTION, SOLUTION INTRAVENOUS; SUBCUTANEOUS at 13:02

## 2020-01-17 RX ADMIN — GUAIFENESIN 600 MG: 600 TABLET ORAL at 21:39

## 2020-01-17 RX ADMIN — MELATONIN TAB 3 MG 6 MG: 3 TAB at 21:38

## 2020-01-17 RX ADMIN — METOPROLOL TARTRATE 50 MG: 50 TABLET, FILM COATED ORAL at 08:13

## 2020-01-17 RX ADMIN — APIXABAN 5 MG: 5 TABLET, FILM COATED ORAL at 17:14

## 2020-01-17 RX ADMIN — TAMSULOSIN HYDROCHLORIDE 0.4 MG: 0.4 CAPSULE ORAL at 17:14

## 2020-01-17 RX ADMIN — CEFAZOLIN SODIUM 2000 MG: 2 SOLUTION INTRAVENOUS at 03:30

## 2020-01-17 RX ADMIN — AMLODIPINE BESYLATE 10 MG: 10 TABLET ORAL at 08:13

## 2020-01-17 RX ADMIN — FUROSEMIDE 80 MG: 10 INJECTION, SOLUTION INTRAMUSCULAR; INTRAVENOUS at 08:13

## 2020-01-17 RX ADMIN — APIXABAN 5 MG: 5 TABLET, FILM COATED ORAL at 08:13

## 2020-01-17 NOTE — PROGRESS NOTES
Progress Note - Infectious Disease   Edgardo Arce 75 y o  male MRN: [id number]  Unit/Bed#: E4 -01 Encounter: [id number]      Impression/Plan:  1  Sepsis   POA   Fever and leukocytosis   Likely secondary to MSSA bacteremia with left hallux wound and underlying osteopmyelitis as presumed source  No other clear source appreciated  Despite being systemically ill the patient has been hemodynamically stable  Repeat blood cultures were negative after 5 days  TTE and ELBERT were negative for valvlular vegetation and pacemaker lead abnormalities  -antibiotic as below  -monitor CBC and BMP  -monitor vitals  -supportive care     2  Dyane Duel in both sets of patient's initial blood cultures  Suspect patient's left great toe ulcer was source   Patient does have an implanted pacemaker  TTE and ELBERT were negative  Repeat blood cultures were clear after 5 days  Patient is currently receiving IV cefazolin and is tolerating without difficulty  He will complete 4 weeks of IV antibiotic treatment, through 2/6/2020   -continue IV cefazolin through 2/6/2020 for 4 weeks of antibiotic treatment  -remove PICC after patient's final dose of IV antibiotics on 2/6/2020  -weekly CBCD and BMP while on IV antibiotics  -monitor vitals  -follow up in the outpatient ID office on 1/28/2020 at [facility] with Ayo ROSA Gist  -antibiotic and lab scripts dropped off in patient chart on 1/15/2020      3  Left hallux ulceration   With osteomyelitis as his wound does probe to bone and an x-ray of the left foot was suggestive of bony erosion at the 1st proximal phalanx   Purulence noted during initial podiatry wound exam, concern for possible infectious tenosynovitis   I suspect this was the source of patient's bacteremia above  Barak Babcock is now status post amputation of hallux for presumed surgical cure of the ulcer and osteomyelitis  Intraop wound cultures showed MSSA  He had additional I&D and removal and sesamoid bone on 1/12/2020    -serial left foot exams  -follow up intraop wound cultures  -local wound care per Podiatry  -continue close follow-up with Podiatry     4  Acute hypoxic respiratory failure   Suspect this is secondary to pulmonary edema and diastolic heart failure   BNP and troponin were elevated upon admission  Chest x-ray and CT of the chest did not show consolidations indicative of a bacterial process  Patient did initially require high-flow O2 but is now stable on nasal cannula O2  Patient with low hemoglobin which have contributed to his SOB  He received a transfusion of PRBC yesterday  -PRBC transfusion per cardiology/primary service  -diuresis per Cardiology  -wean O2 support as tolerated per primary service  -monitor vitals  -monitor respiratory status  -continue close follow-up with Cardiology     5  Type 2 diabetes mellitus with neuropathy   Patient's last hemoglobin A1c was 6 4% on 01/07/2020   This is risk factor for wounds and infection   Recommend tight glycemic control for overall health, especially in the setting of wound infection   -blood glucose management per primary service     6  Chronic diastolic heart failure   In setting of aortic and mitral valve stenosis and complete heart block   He has a [employer] dual chamber pacemaker in place   Now with Staph aureus bacteremia  TTE and ELBERT were negative  He is following closely with Cardiology   -continue follow-up with Cardiology     7  Paroxysmal AFib   On Eliquis  Patient is stable for discharge from ID standpoint  If he remains inpatient he will be formally reassessed by the Infectious Disease team on Monday, 01/20/2020  Please call sooner with questions  Above plan was discussed in detail with patient at the bedside, and his wife in the hallway  Antibiotics:  Cefazolin 8  Antibiotics 11    Subjective:  Patient reports that he is feeling much better today    He is feeling like he got more sleep last night but still wants to be home sleeping in his own bed as he feels he has not gotten good rest in a long time  He has no pain in his left foot and tells me he is waiting for a new surgical shoe  He denies fever, chills, sweats, shakes; no nausea, vomiting, abdominal pain, diarrhea, or dysuria; no cough; he reports he has had improvement in his shortness of breath and he denies chest pain  No new symptoms  Objective:  Vitals:  Temp:  [98 °F (36 7 °C)-99 7 °F (37 6 °C)] 98 2 °F (36 8 °C)  HR:  [70-93] 74  Resp:  [18-20] 20  BP: (146-160)/(64-73) 148/64  SpO2:  [93 %-97 %] 94 %  Temp (24hrs), Av 7 °F (37 1 °C), Min:98 °F (36 7 °C), Max:99 7 °F (37 6 °C)  Current: Temperature: 98 2 °F (36 8 °C)    Physical Exam:   General Appearance:  Alert, interactive, nontoxic, no acute distress  Lungs:   Clear to auscultation bilaterally; respirations unlabored; patient is on nasal cannula O2   Heart:  RRR; no murmur, rub or gallop   Abdomen:   Soft, non-tender, non-distended, positive bowel sounds       Extremities: Left foot dressing is dry and intact, no spreading erythema from bandage site     Labs, Imaging, & Other studies:   All pertinent labs and imaging studies were personally reviewed  Results from last 7 days   Lab Units 20  0643 20  0453 01/15/20  0526   WBC Thousand/uL 14 88* 13 37* 16 24*   HEMOGLOBIN g/dL 8 0* 7 4* 7 8*   PLATELETS Thousands/uL 477* 458* 481*     Results from last 7 days   Lab Units 20  0643   POTASSIUM mmol/L 3 5   CHLORIDE mmol/L 100   CO2 mmol/L 23   BUN mg/dL 87*   CREATININE mg/dL 2 17*   EGFR ml/min/1 73sq m 31   CALCIUM mg/dL 8 5

## 2020-01-17 NOTE — PROGRESS NOTES
[facility] Internal Medicine Progress Note  Patient: Jaquelin Akhtar 75 y o  male   MRN: [id number]  PCP: Lenin Love DO  Unit/Bed#: E4 -01 Encounter: [id number]  Date Of Visit: 01/17/20      Assessment/plan  1  Severe sepsis due to MSSA bacteremia from left hallux wound- appreciate ID recommendations  Continue ancef  S/p picc  S/p ELBERT that was negative for vegetation  He is s/p Left hallux amputation on 1/8/2020 and s/p I and D with removal of sesmoid bone on 1/12/2020  Appreciate podiatry recommendations  He will continue ancef through 2/6/20  He will need repeat blood cultures 2 weeks after completion of antibiotic treatment  He will also need weekly cbc and creatinine while on iv antibiotics       2  Acute respiratory failure with hypoxia due to acute diastolic chf  Continue diuresis  Continue to wean oxygen as tolerated  He may require a home oxygen eval prior to discharge       3  Acute diastolic congestive heart failure  Appreciate cardiology recommendations  Continue IV lasix  increased to 80mg IV bid  slight elevation in creatinine but pt is still fluid overloaded  Will check bmp in am       4  Acute renal failure/ckd3- baseline creatinine is 1 1-1 4  Currently creatinine is 2 17  Arb is being held  Have accepted a higher baseline as trying to treat pts hypervolemia  Will check bmp in am       5  Type 2 diabetes with hyperglycemia- continue lantus at 22 units  Will continue insulin sliding scale  Pt will mostly likely not continue metformin at discharge       6  Obstructive sleep apnea     7  Paroxysmal atrial fibrillation- continue eliquis  Continue metoprolol     8  Hyponatremia- slight decrease from yesterday  Pt to continue IV lasix  Check bmp in am       9 History of complete heart block, status post pacemaker     10  Normocytic anemia- likely due to acute blood loss anemia from recent procedures and ckd  S/p 1 unit of prbc  H/h is stable  Will monitor       11  anxiety- continue ativan prn      dispo- wife at bedside and updated    Subjective:   Pt seen and examined  Pt states his specialized shoe is not the right size  He states he is breathing better  No f/c no cp no n/v/d no abd pain  Objective:     Vitals: Blood pressure 165/73, pulse 88, temperature 98 4 °F (36 9 °C), temperature source Temporal, resp  rate 18, weight 99 5 kg (219 lb 5 7 oz), SpO2 95 %  ,Body mass index is 31 76 kg/m²  Lab, Imaging and other studies:  Results from last 7 days   Lab Units 01/17/20  0643   WBC Thousand/uL 14 88*   HEMOGLOBIN g/dL 8 0*   HEMATOCRIT % 24 9*   PLATELETS Thousands/uL 477*     Results from last 7 days   Lab Units 01/17/20  0643   POTASSIUM mmol/L 3 5   CHLORIDE mmol/L 100   CO2 mmol/L 23   BUN mg/dL 87*   CREATININE mg/dL 2 17*   CALCIUM mg/dL 8 5         Lab Results   Component Value Date    BLOODCX No Growth After 5 Days  01/09/2020    BLOODCX No Growth After 5 Days  01/09/2020    BLOODCX Staphylococcus aureus (A) 01/07/2020    BLOODCX Staphylococcus aureus (A) 01/07/2020    WOUNDCULT 4+ Growth of Escherichia coli 07/19/2017    WOUNDCULT 1+ Growth of Mixed Skin Jem 07/19/2017    WOUNDCULT Growth in Broth culture only Citrobacter freundii 05/02/2017    SPUTUMCULTUR 2+ Growth of  05/03/2019    SPUTUMCULTUR  05/03/2019     Commensal respiratory jem only; No significant growth of Staph aureus/MRSA or Pseudomonas aeruginosa       Scheduled Meds:   Current Facility-Administered Medications:  acetaminophen 650 mg Oral Q6H PRN Melissa Ambron, DO    allopurinol 300 mg Oral QAM Delorise Dial, DPM    amLODIPine 10 mg Oral Daily Delorise Dial, DPM    apixaban 5 mg Oral BID Melissa Ambron, DO    cefazolin 2,000 mg Intravenous Q8H Delorise Dial, DPM Last Rate: 2,000 mg (01/17/20 1055)   ferrous gluconate 324 mg Oral BID AC Melissa Ambron, DO    furosemide 80 mg Intravenous BID (diuretic) Glady Bamberger, MD    guaiFENesin 600 mg Oral BID Delorise Dial, DPM    insulin glargine 22 Units Subcutaneous HS Melissa Alonso DO    insulin lispro 2-12 Units Subcutaneous TID AC Oris Oh, DPM    ipratropium-albuterol 3 mL Nebulization Q6H PRN Oris Oh, DPM    LORazepam 0 5 mg Oral Q8H PRN Melissa Alonso DO    melatonin 6 mg Oral HS Oris Oh, DPM    metoprolol tartrate 50 mg Oral Q12H [address] Oris Oh, DPM    oxyCODONE 5 mg Oral Q4H PRN Oris Oh, DPM    pravastatin 40 mg Oral Daily With Rosalba Michelle Kasey, DPM    tamsulosin 0 4 mg Oral Daily With Rosalba Michelle Cha DPM      Continuous Infusions:    PRN Meds:   acetaminophen    ipratropium-albuterol    LORazepam    oxyCODONE      Physical exam:  Physical Exam  General appearance: alert and oriented, in no acute distress  Head: Normocephalic, without obvious abnormality, atraumatic  Eyes: conjunctivae/corneas clear  PERRL, EOM's intact  Fundi benign    Neck: no adenopathy, no carotid bruit, no JVD, supple, symmetrical, trachea midline and thyroid not enlarged, symmetric, no tenderness/mass/nodules  Lungs: slight crackles at bases bilateral  Heart: regular rate and rhythm, S1, S2 normal, no murmur, click, rub or gallop  Abdomen: soft, non-tender; bowel sounds normal; no masses,  no organomegaly  Extremities: edema +1 pitting bilateral lower ext  Pulses: 2+ and symmetric  Skin: bandage c/d/i  Neurologic: Mental status: Alert, oriented, thought content appropriate      VTE Pharmacologic Prophylaxis:eliquis  VTE Mechanical Prophylaxis: sequential compression device    Counseling / Coordination of Care  Total floor / unit time spent today 20 minutes    Current Length of Stay: 10 day(s)    Current Patient Status: Inpatient       Code Status: Level 1 - Full Code

## 2020-01-17 NOTE — PROGRESS NOTES
Progress Note - Cardiology   Brandon Seo 75 y o  male MRN: [id number]  Unit/Bed#: E4 -01 Encounter: [id number]      Assessment/Recommendations/Discussion:   1  Acute hypoxic respiratory failure with suspected pneumonia plus or minus CHF  2  Chronic diastolic heart failure on Lasix 40 b i d  As outpatient  3  Moderate aortic and mitral stenosis  4  Complete heart block status post pacemaker  5  Hypertension  6  Dyslipidemia  7  Acute on chronic kidney injury  8  Elevated troponin  9  Type 2 diabetes  10  Paroxysmal atrial fibrillation on Eliquis and Lopressor  11  Hypoalbuminemia  12  Osteomyelitis of left foot    PLAN   Still appears volume overloaded has significant lower extremity edema still   Breathing is better however still on oxygen  Would try to wean off oxygen as much as possible   He improved after a unit of blood was given, subjectively with respect to his breathing   Creatinine slightly elevated from yesterday   Would continue to current diuresis with IV Lasix b i d , replete electrolytes as needed   Monitor renal function   Continue amlodipine, Eliquis, Lopressor, statin   Antibiotics per infectious disease/primary team        Subjective:   HPI  Feeling like his breathing is better however still requiring nasal cannula oxygen  Does have bilateral lower extremity edema  Status post 1 unit of blood transfused yesterday  Felt like his breathing improved after the blood was given  Review of Systems: As noted in HPI  Rest of ROS is negative      Vitals:   /73 (BP Location: Left arm)   Pulse 88   Temp 98 4 °F (36 9 °C) (Temporal)   Resp 18   Wt 99 5 kg (219 lb 5 7 oz)   SpO2 95%   BMI 31 76 kg/m²   Vitals:    01/16/20 0600 01/17/20 0532   Weight: 100 kg (221 lb 6 4 oz) 99 5 kg (219 lb 5 7 oz)       Intake/Output Summary (Last 24 hours) at 1/17/2020 0910  Last data filed at 1/17/2020 0325  Gross per 24 hour   Intake 350 ml   Output 700 ml   Net -350 ml       Physical Exam:  General appearance: alert and oriented, in no acute distress  Head: Normocephalic, without obvious abnormality, atraumatic  Eyes: conjunctivae/corneas clear  Anicteric    Neck: no adenopathy, no carotid bruit, no JVD  Lungs: clear to auscultation bilaterally  Heart: regular rate and rhythm, S1, S2 normal, no murmur, no click, rub or gallop  Abdomen: soft, non-tender; bowel sounds normal; no masses,  no organomegaly  Extremities:  Bilateral lower extremity edema, left foot status post amputation of the big toe  Skin: Skin color, texture, turgor normal  No rashes or lesions     Lab Results:  Results from last 7 days   Lab Units 01/17/20  0643   WBC Thousand/uL 14 88*   HEMOGLOBIN g/dL 8 0*   HEMATOCRIT % 24 9*   PLATELETS Thousands/uL 477*     Results from last 7 days   Lab Units 01/17/20  0643   POTASSIUM mmol/L 3 5   CHLORIDE mmol/L 100   CO2 mmol/L 23   BUN mg/dL 87*   CREATININE mg/dL 2 17*   CALCIUM mg/dL 8 5     Results from last 7 days   Lab Units 01/17/20  0643   POTASSIUM mmol/L 3 5   CHLORIDE mmol/L 100   CO2 mmol/L 23   BUN mg/dL 87*   CREATININE mg/dL 2 17*   CALCIUM mg/dL 8 5           Medications:    Current Facility-Administered Medications:     acetaminophen (TYLENOL) tablet 650 mg, 650 mg, Oral, Q6H PRN, Melissa Sanchezron, DO, 650 mg at 01/15/20 1608    allopurinol (ZYLOPRIM) tablet 300 mg, 300 mg, Oral, QAM, Fredrick Hill, DPM, 300 mg at 01/17/20 0813    amLODIPine (NORVASC) tablet 10 mg, 10 mg, Oral, Daily, Fredrick Hill, DPM, 10 mg at 01/17/20 0813    apixaban (ELIQUIS) tablet 5 mg, 5 mg, Oral, BID, Melissa Alonso DO, 5 mg at 01/17/20 0813    ceFAZolin (ANCEF) IVPB (premix) 2,000 mg, 2,000 mg, Intravenous, Q8H, Fredrick Hill DPM, Last Rate: 100 mL/hr at 01/17/20 0330, 2,000 mg at 01/17/20 0330    ferrous gluconate (FERGON) tablet 324 mg, 324 mg, Oral, BID AC, Melissa Alonso DO, 324 mg at 01/17/20 0644    furosemide (LASIX) injection 80 mg, 80 mg, Intravenous, BID (diuretic), Adelina Zaragoza Neymar Cartagena MD, 80 mg at 01/17/20 0813    guaiFENesin (MUCINEX) 12 hr tablet 600 mg, 600 mg, Oral, BID, Rosan Clause, DPM, 600 mg at 01/17/20 0813    insulin glargine (LANTUS) subcutaneous injection 22 Units 0 22 mL, 22 Units, Subcutaneous, HS, Melissa Ambron, DO, 22 Units at 01/16/20 2128    insulin lispro (HumaLOG) 100 units/mL subcutaneous injection 2-12 Units, 2-12 Units, Subcutaneous, TID AC, 4 Units at 01/16/20 1716 **AND** Fingerstick Glucose (POCT), , , TID AC, Rosan Clause, DPM    ipratropium-albuterol (DUO-NEB) 0 5-2 5 mg/3 mL inhalation solution 3 mL, 3 mL, Nebulization, Q6H PRN, Rosan Clause, DPM, 3 mL at 01/07/20 2025    LORazepam (ATIVAN) tablet 0 5 mg, 0 5 mg, Oral, Q8H PRN, Melissa Sanchezron, DO, 0 5 mg at 01/16/20 1429    melatonin tablet 6 mg, 6 mg, Oral, HS, Rosan Clause, DPM, 6 mg at 01/16/20 2128    metoprolol tartrate (LOPRESSOR) tablet 50 mg, 50 mg, Oral, Q12H [address], Rosan Clause, DPM, 50 mg at 01/17/20 0813    oxyCODONE (ROXICODONE) IR tablet 5 mg, 5 mg, Oral, Q4H PRN, Rosan Clause, DPM, 5 mg at 01/12/20 1709    pravastatin (PRAVACHOL) tablet 40 mg, 40 mg, Oral, Daily With Jourdanton Grover, DPM, 40 mg at 01/16/20 1706    tamsulosin (FLOMAX) capsule 0 4 mg, 0 4 mg, Oral, Daily With Jourdanton Grover, DPM, 0 4 mg at 01/16/20 1706    This note was completed in part utilizing M-Modal Fluency Direct Software  Grammatical errors, random word insertions, spelling mistakes, and incomplete sentences may be an occasional consequence of this system secondary to software limitations, ambient noise, and hardware issues  If you have any questions or concerns about the content, text, or information contained within the body of this dictation, please contact the provider for clarification      Wilfredo Han DO, [facility]  1/17/2020 9:10 AM

## 2020-01-17 NOTE — NURSING NOTE
Per Dr Wendy Grover consent obtained for operation performed during admission adequate and covers need for blood consent for transfusion

## 2020-01-17 NOTE — OCCUPATIONAL THERAPY NOTE
Occupational Therapy Treatment Note:         01/17/20 1240   Restrictions/Precautions   LLE Weight Bearing Per Order NWB   Braces or Orthoses   (surgical shoe)   Other Precautions O2;Fall Risk;Pain;WBS  (3 5)   Pain Assessment   Pain Assessment No/denies pain   Pain Score No Pain   Pain Type Chronic pain   Pain Location Back   Pain Orientation Lower   ADL   Where Assessed Chair   Grooming Assistance 5  Supervision/Setup   Grooming Deficit Setup   UB Bathing Assistance 4  Minimal Assistance   UB Bathing Deficit Setup   LB Bathing Assistance 3  Moderate Assistance   LB Bathing Deficit Setup;Steadying;Verbal cueing;Supervision/safety; Increased time to complete; Buttocks; Perineal area;Right lower leg including foot; Left lower leg including foot   LB Bathing Comments cues for safety required with functional reach  LB Dressing Assistance 3  Moderate Assistance   LB Dressing Deficit Setup;Steadying; Requires assistive device for steadying;Verbal cueing;Supervision/safety; Increased time to complete; Don/doff R sock; Don/doff L sock   LB Dressing Comments Cues for safety required   [address]  3  Moderate Assistance   Toileting Deficit Setup;Steadying;Verbal cueing;Supervison/safety; Increased time to complete; Bedside commode;Perineal hygiene;Clothing management up;Clothing management down;Grab bar use   Toileting Comments increased A will be required with clothing management and isaias anal hygiene  Functional Standing Tolerance   Time 3 mins   Activity dynamic stand balance activities  Comments Pt with increased SOB with activity SAT dipped to 89% on 3 5 liters O2  Bed Mobility   Supine to Sit 5  Supervision   Additional items Assist x 1;Bedrails; Increased time required;Verbal cues;LE management   Transfers   Sit to Stand 6  Modified independent   Additional items Assist x 1;Bedrails;Armrests; Increased time required;Verbal cues   Stand to Sit 4  Minimal assistance   Additional items Assist x 1; Armrests Stand pivot 4  Minimal assistance   Additional items Assist x 1; Armrests; Increased time required;Verbal cues   Toilet transfer 4  Minimal assistance   Additional Comments Cues for safety required  Functional Mobility   Functional Mobility 4  Minimal assistance   Additional Comments x1   Additional items Rolling walker   Cognition   Overall Cognitive Status WFL   Arousal/Participation Alert; Cooperative;Responsive   Attention Attends with cues to redirect   Orientation Level Oriented X4   Memory Decreased short term memory;Decreased recall of recent events;Decreased recall of precautions   Following Commands Follows one step commands without difficulty   Comments increased A required with functional tasks  Additional Activities   Additional Activities Other (Comment)  (reviewed current plan of care  )   Additional Activities Comments Pt with increased fatigue noted with actiities  Activity Tolerance   Activity Tolerance Patient limited by fatigue   Medical Staff Made Aware Reported all findings to nursing staff  Assessment   Assessment Pt was seen for skilled OT with focus on providing education regarding NWB status to LLE with self care tasks, review of fall prevention, review of compensatory breathing techs and review of current plan of care  Pt with need for moderate encouragement to achieve OOB positioning  Min A overall to achieve EOB positioning  Noted increased SOB upon excursion this tx session  SAT dipped to 89% on 3 4 liters O2  Extended rest break provided prior to completion of STP to bedside chair with Min A x1 and cues for safe use of RW to maintain NWB status  Pt reports having walked over to window without A with use of RW  Educated Pt on need to ring for A with need to ambulate  See above levels of A required for all functional tasks  Pt prefers to go directly home with his spouse  Rosa Hsu Pt may benefit from further rehab with focus on achieving optimal performance levels with all functional tasks     Plan   Treatment Interventions ADL retraining;Functional transfer training; Endurance training;Cognitive reorientation   Goal Expiration Date 01/19/20   OT Treatment Day 3   OT Frequency 3-5x/wk   Recommendation   OT Discharge Recommendation Short Term Rehab   Equipment Recommended Bedside commode   OT - OK to Discharge Yes  (when medically cleared  )   Barthel Index   Feeding 10   Bathing 0   Grooming Score 5   Dressing Score 5   Bladder Score 10   Bowels Score 10   Toilet Use Score 5   Transfers (Bed/Chair) Score 10   Mobility (Level Surface) Score 0   Stairs Score 0   Barthel Index Score 55   Mallory Felder, [address]

## 2020-01-17 NOTE — PROGRESS NOTES
Progress Note - Podiatry  Kee Yepez 75 y o  male MRN: [id number]  Unit/Bed#: E4 -01 Encounter: [id number]    Assessment:  1  S/p I&D with removal of sesamoid on 01/12/20 and s/p left hallux amputation on 01/08/20 due to underlying clinical OM, abscess, and possible infectious tenosynovitis   2  Sepsis - POA  3  Bacteremia likely secondary to #1  4  DM type 2    Plan:  - left hallux incision site appears stable with intact sutures  No clinical signs of infection noted  Dressed with betadine paint and DSD    - Continue NWB to LLE   - PT/OT eval and treat   - Abx as per ID recommendations   - will update attending   - rest of care per primary service     Subjective/Objective   Chief Complaint:   Chief Complaint   Patient presents with    Fatigue     Pt with multiple complaints: sore throat, diarrhea, fatigue, fevers, tylenol at 0930  -ill contacts  Subjective: 75 y o  y/o male was seen and evaluated at bedside  Patient denies any acute events overnight  States his breathing is better since transfusion of unit of blood  Blood pressure 165/73, pulse 88, temperature 98 4 °F (36 9 °C), temperature source Temporal, resp  rate 18, weight 99 5 kg (219 lb 5 7 oz), SpO2 95 %  ,Body mass index is 31 76 kg/m²  Invasive Devices     Peripherally Inserted Central Catheter Line            PICC Line 34/54/55 Right Basilic 3 days          Peripheral Intravenous Line            Peripheral IV 01/16/20 Left Forearm 1 day                Physical Exam:   General: Alert, cooperative and no distress  Lungs: Non labored breathing  Heart: Positive S1, S2  Abdomen: Soft, non-tender  Extremity: Left foot incision sire appears stable with intact sutures  There is pitting edema noted on the lower extremities due to fluid overload  No clinica signs of infection noted  NO calf tenderness noted  NVS and MSK at baseline         1/17        Lab, Imaging and other studies:   CBC:   Lab Results   Component Value Date    WBC 14 88 (H) 01/17/2020    HGB 8 0 (L) 01/17/2020    HCT 24 9 (L) 01/17/2020    MCV 93 01/17/2020     (H) 01/17/2020    MCH 30 0 01/17/2020    MCHC 32 1 01/17/2020    RDW 14 4 01/17/2020    MPV 9 2 01/17/2020   , CMP:   Lab Results   Component Value Date    SODIUM 133 (L) 01/17/2020    K 3 5 01/17/2020     01/17/2020    CO2 23 01/17/2020    BUN 87 (H) 01/17/2020    CREATININE 2 17 (H) 01/17/2020    CALCIUM 8 5 01/17/2020    EGFR 31 01/17/2020       Imaging: I have personally reviewed pertinent films in PACS  EKG, Pathology, and Other Studies: I have personally reviewed pertinent reports

## 2020-01-17 NOTE — PLAN OF CARE
Problem: PHYSICAL THERAPY ADULT  Goal: Performs mobility at highest level of function for planned discharge setting  See evaluation for individualized goals  Description  Treatment/Interventions: Functional transfer training, Therapeutic exercise, Elevations, Patient/family training, Equipment eval/education, Bed mobility, Gait training  Equipment Recommended: Mat Prader, Wheelchair       See flowsheet documentation for full assessment, interventions and recommendations  Note:   Prognosis: Good  Problem List: Decreased strength, Impaired balance, Decreased mobility, Decreased skin integrity, Orthopedic restrictions, Impaired judgement, Decreased range of motion, Decreased safety awareness, Decreased coordination, Decreased endurance  Assessment: Mary Castillo was seen for a follow up session today; pt agreeable to PT at this time  Pt demonstrates improvements in mobility requiring sup for bed mobility and standing transf, min Ax1 for stand>sit and hop to amb w RW 4'  Post amb pt O2 sats recorded as 89% on 3 5 L NC- pt require increased time and rest breaks to improve O2 sats  Educated pt on breathing techniques- pt demonstrate understanding  Additional time needed to provided mobility training to pt on stand>sit transf as pt demosntrates poorly controlled descent- pt with improvements in stand>sit transf w x2 trials however would benefit from continued education to ensure learning  Reinforced to pt importance of continued participation in therapy and progression of mobility as tolerated, including OOB positioning and amb w nsg as tolerated  Also reinforced safety precautions including fall risk, use of call bell and supervised amb- pt verbalize understanding  End of session pt seated in bedside chair, LLE elevated and sx shoe donned (throughout session)  Call bell and all needs in reach     Barriers to Discharge: None  Barriers to Discharge Comments: supportive wife, no LAURA  Recommendation: Short-term skilled PT     PT - OK to Discharge: Yes    See flowsheet documentation for full assessment

## 2020-01-17 NOTE — PLAN OF CARE
Problem: Potential for Falls  Goal: Patient will remain free of falls  Description  INTERVENTIONS:  - Assess patient frequently for physical needs  -  Identify cognitive and physical deficits and behaviors that affect risk of falls    -  [place] fall precautions as indicated by assessment   - Educate patient/family on patient safety including physical limitations  - Instruct patient to call for assistance with activity based on assessment  - Modify environment to reduce risk of injury  - Consider OT/PT consult to assist with strengthening/mobility  Outcome: Progressing     Problem: PAIN - ADULT  Goal: Verbalizes/displays adequate comfort level or baseline comfort level  Description  Interventions:  - Encourage patient to monitor pain and request assistance  - Assess pain using appropriate pain scale  - Administer analgesics based on type and severity of pain and evaluate response  - Implement non-pharmacological measures as appropriate and evaluate response  - Consider cultural and social influences on pain and pain management  - Notify physician/advanced practitioner if interventions unsuccessful or patient reports new pain  Outcome: Progressing     Problem: INFECTION - ADULT  Goal: Absence or prevention of progression during hospitalization  Description  INTERVENTIONS:  - Assess and monitor for signs and symptoms of infection  - Monitor lab/diagnostic results  - Monitor all insertion sites, i e  indwelling lines, tubes, and drains  - Administer medications as ordered  - Instruct and encourage patient and family to use good hand hygiene technique  - Identify and instruct in appropriate isolation precautions for identified infection/condition   Outcome: Progressing     Problem: SAFETY ADULT  Goal: Maintain or return to baseline ADL function  Description  INTERVENTIONS:  -  Assess patient's ability to carry out ADLs; assess patient's baseline for ADL function and identify physical deficits which impact ability to perform ADLs (bathing, care of mouth/teeth, toileting, grooming, dressing, etc )  - Assess/evaluate cause of self-care deficits   - Assess range of motion  - Assess patient's mobility; develop plan if impaired  - Assess patient's need for assistive devices and provide as appropriate  - Encourage maximum independence but intervene and supervise when necessary  - Involve family in performance of ADLs  - Assess for home care needs following discharge   - Consider OT consult to assist with ADL evaluation and planning for discharge  - Provide patient education as appropriate  Outcome: Progressing  Goal: Maintain or return mobility status to optimal level  Description  INTERVENTIONS:  - Assess patient's baseline mobility status (ambulation, transfers, stairs, etc )    - Identify cognitive and physical deficits and behaviors that affect mobility  - Identify mobility aids required to assist with transfers and/or ambulation (gait belt, sit-to-stand, lift, walker, cane, etc )  - [place] fall precautions as indicated by assessment  - Record patient progress and toleration of activity level on Mobility SBAR; progress patient to next Phase/Stage  - Instruct patient to call for assistance with activity based on assessment  - Consider rehabilitation consult to assist with strengthening/weightbearing, etc   Outcome: Progressing     Problem: DISCHARGE PLANNING  Goal: Discharge to home or other facility with appropriate resources  Description  INTERVENTIONS:  - Identify barriers to discharge w/patient and caregiver  - Arrange for needed discharge resources and transportation as appropriate  - Identify discharge learning needs (meds, wound care, etc )  - Arrange for interpretive services to assist at discharge as needed  - Refer to Case Management Department for coordinating discharge planning if the patient needs post-hospital services based on physician/advanced practitioner order or complex needs related to functional status, cognitive ability, or social support system  Outcome: Progressing     Problem: Knowledge Deficit  Goal: Patient/family/caregiver demonstrates understanding of disease process, treatment plan, medications, and discharge instructions  Description  Complete learning assessment and assess knowledge base    Interventions:  - Provide teaching at level of understanding  - Provide teaching via preferred learning methods  Outcome: Progressing     Problem: CARDIOVASCULAR - ADULT  Goal: Maintains optimal cardiac output and hemodynamic stability  Description  INTERVENTIONS:  - Monitor I/O, vital signs and rhythm  - Monitor for S/S and trends of decreased cardiac output  - Administer and titrate ordered vasoactive medications to optimize hemodynamic stability  - Assess quality of pulses, skin color and temperature  - Assess for signs of decreased coronary artery perfusion  - Instruct patient to report change in severity of symptoms  Outcome: Progressing  Goal: Absence of cardiac dysrhythmias or at baseline rhythm  Description  INTERVENTIONS:  - Continuous cardiac monitoring, vital signs, obtain 12 lead EKG if ordered  - Administer antiarrhythmic and heart rate control medications as ordered  - Monitor electrolytes and administer replacement therapy as ordered  Outcome: Progressing     Problem: RESPIRATORY - ADULT  Goal: Achieves optimal ventilation and oxygenation  Description  INTERVENTIONS:  - Assess for changes in respiratory status  - Assess for changes in mentation and behavior  - Position to facilitate oxygenation and minimize respiratory effort  - Oxygen administered by appropriate delivery if ordered  - Initiate smoking cessation education as indicated  - Encourage broncho-pulmonary hygiene including cough, deep breathe, Incentive Spirometry  - Assess the need for suctioning and aspirate as needed  - Assess and instruct to report SOB or any respiratory difficulty  - Respiratory Therapy support as indicated  Outcome: Progressing     Problem: Prexisting or High Potential for Compromised Skin Integrity  Goal: Skin integrity is maintained or improved  Description  INTERVENTIONS:  - Identify patients at risk for skin breakdown  - Assess and monitor skin integrity  - Assess and monitor nutrition and hydration status  - Monitor labs   - Assess for incontinence   - Turn and reposition patient  - Assist with mobility/ambulation  - Relieve pressure over bony prominences  - Avoid friction and shearing  - Provide appropriate hygiene as needed including keeping skin clean and dry  - Evaluate need for skin moisturizer/barrier cream  - Collaborate with interdisciplinary team   - Patient/family teaching  - Consider wound care consult   Outcome: Progressing     Problem: Nutrition/Hydration-ADULT  Goal: Nutrient/Hydration intake appropriate for improving, restoring or maintaining nutritional needs  Description  Monitor and assess patient's nutrition/hydration status for malnutrition  Collaborate with interdisciplinary team and initiate plan and interventions as ordered  Monitor patient's weight and dietary intake as ordered or per policy  Utilize nutrition screening tool and intervene as necessary  Determine patient's food preferences and provide high-protein, high-caloric foods as appropriate       INTERVENTIONS:  - Monitor oral intake, urinary output, labs, and treatment plans  - Assess nutrition and hydration status and recommend course of action  - Evaluate amount of meals eaten  - Assist patient with eating if necessary   - Allow adequate time for meals  - Recommend/ encourage appropriate diets, oral nutritional supplements, and vitamin/mineral supplements  - Order, calculate, and assess calorie counts as needed  - Recommend, monitor, and adjust tube feedings and TPN/PPN based on assessed needs  - Assess need for intravenous fluids  - Provide specific nutrition/hydration education as appropriate  - Include patient/family/caregiver in decisions related to nutrition  Outcome: Progressing     Problem: METABOLIC, FLUID AND ELECTROLYTES - ADULT  Goal: Glucose maintained within target range  Description  INTERVENTIONS:  - Monitor Blood Glucose as ordered  - Assess for signs and symptoms of hyperglycemia and hypoglycemia  - Administer ordered medications to maintain glucose within target range  - Assess nutritional intake and initiate nutrition service referral as needed  Outcome: Progressing     Problem: SKIN/TISSUE INTEGRITY - ADULT  Goal: Skin integrity remains intact  Description  INTERVENTIONS  - Identify patients at risk for skin breakdown  - Assess and monitor skin integrity  - Assess and monitor nutrition and hydration status  - Monitor labs (i e  albumin)  - Assess for incontinence   - Turn and reposition patient  - Assist with mobility/ambulation  - Relieve pressure over bony prominences  - Avoid friction and shearing  - Provide appropriate hygiene as needed including keeping skin clean and dry  - Evaluate need for skin moisturizer/barrier cream  - Collaborate with interdisciplinary team (i e  Nutrition, Rehabilitation, etc )   - Patient/family teaching  Outcome: Progressing  Goal: Incision(s), wounds(s) or drain site(s) healing without S/S of infection  Description  INTERVENTIONS  - Assess and document risk factors for skin impairment   - Assess and document dressing, incision, wound bed, drain sites and surrounding tissue  - Consider nutrition services referral as needed  - Oral mucous membranes remain intact  - Provide patient/ family education  Outcome: Progressing  Goal: Oral mucous membranes remain intact  Description  INTERVENTIONS  - Assess oral mucosa and hygiene practices  - Implement preventative oral hygiene regimen  - Implement oral medicated treatments as ordered  - Initiate Nutrition services referral as needed  Outcome: Progressing

## 2020-01-17 NOTE — NURSING NOTE
Patient c/o SOB lying in bed with 2L NC applied  SaO2 checked at 80%, NC flow rate increased to 6L and patient O2 recovered to 94% sitting at bedside   Message sent to Dr Ketan Irwin, no new orders at this time

## 2020-01-17 NOTE — PHYSICAL THERAPY NOTE
PHYSICAL THERAPY NOTE          Patient Name: Jose Curiel  FDDGC'N Date: 1/17/2020   Time In: 1214 Time Out: 1239       01/17/20 1239   Pain Assessment   Pain Assessment No/denies pain   Pain Score No Pain   Restrictions/Precautions   LLE Weight Bearing Per Order NWB   Braces or Orthoses Other (Comment)  (surgical shoe)   Other Precautions WBS;O2;Fall Risk  (3 5 L NC)   General   Chart Reviewed Yes   Response to Previous Treatment Patient with no complaints from previous session  Family/Caregiver Present No   Cognition   Overall Cognitive Status WFL   Arousal/Participation Alert; Cooperative   Attention Within functional limits   Orientation Level Oriented X4  (general to time- month and year)   Memory Decreased recall of precautions;Decreased recall of recent events   Following Commands Follows one step commands without difficulty   Comments flat affect, decreased engagement in conversation  pt reports amb indep in room- educated pt on fall risk precautions and current mobility status requiring supervised amb   Subjective   Subjective pt agreeable to therapy at this time   Bed Mobility   Supine to Sit 5  Supervision   Additional items HOB elevated; Bedrails;Verbal cues   Transfers   Sit to Stand 5  Supervision   Additional items Bedrails; Increased time required;Verbal cues; Other  (RW)   Stand to Sit 4  Minimal assistance   Additional items Assist x 1; Armrests; Increased time required;Verbal cues; Other  (poorly controlled descent)   Additional Comments pt requires increased A for stand>sit transf w poorly controlled descent; trialed x2 from bedside chair w cues for technique and to maintain NWB status- pt improved w cues and additional trials   Ambulation/Elevation   Gait pattern Decreased foot clearance  (hop to pattern, mild instability)   Gait Assistance 4  Minimal assist   Additional items Assist x 1;Verbal cues   Assistive Device Rolling walker   Distance 4'   Stair Management Assistance Not tested   Balance   Static Sitting Good   Dynamic Sitting Fair +  (pt require A to don sx shoe at EOB)   Static Standing Fair -  (RW)   Dynamic Standing Poor +  (RW)   Ambulatory Poor +  (RW)   Endurance Deficit   Endurance Deficit Yes   Endurance Deficit Description O2 sats recorded as 96% at rest on 3 5 L NC, 89% post amb which improved w increased time and rest   Activity Tolerance   Activity Tolerance Patient tolerated treatment well;Patient limited by fatigue   Medical Staff Bree RN   Exercises   Knee AROM Long Arc Quad Sitting;10 reps;AROM; Bilateral   Assessment   Prognosis Good   Problem List Decreased strength; Impaired balance;Decreased mobility; Decreased skin integrity;Orthopedic restrictions; Impaired judgement;Decreased range of motion;Decreased safety awareness;Decreased coordination;Decreased endurance   Assessment Mary Pantoja was seen for a follow up session today; pt agreeable to PT at this time  Pt demonstrates improvements in mobility requiring sup for bed mobility and standing transf, min Ax1 for stand>sit and hop to amb w RW 4'  Post amb pt O2 sats recorded as 89% on 3 5 L NC- pt require increased time and rest breaks to improve O2 sats  Educated pt on breathing techniques- pt demonstrate understanding  Additional time needed to provided mobility training to pt on stand>sit transf as pt demosntrates poorly controlled descent- pt with improvements in stand>sit transf w x2 trials however would benefit from continued education to ensure learning  Reinforced to pt importance of continued participation in therapy and progression of mobility as tolerated, including OOB positioning and amb w nsg as tolerated  Also reinforced safety precautions including fall risk, use of call bell and supervised amb- pt verbalize understanding   End of session pt seated in bedside chair, LLE elevated and sx shoe donned (throughout session)  Call bell and all needs in reach  Barriers to Discharge None   Barriers to Discharge Comments supportive wife, no LAURA   Goals   Patient Goals no new goals stated   STG Expiration Date 01/27/20   Short Term Goal #1 1) Perform all transfers Annalise demonstrating safe and appropriate technique 100% of the time in order to improve ability to negotiate safely in home environment  2) Amb with LRAD >25' with mod I in order to demonstrate ability to negotiate in home environment  3)  Improve overall strength and balance 1/2 grade in order to optimize ability to perform functional tasks and reduce fall risk  4) Increase activity tolerance to 45 minutes in order to improve endurance to functional tasks  5)  Negotiate stairs using most appropriate technique and S in order to be able to negotiate safely in home environment  6) PT for ongoing patient and family/caregiver education, DME needs and d/c planning in order to promote highest level of function in least restrictive environment  7) maintain NWB LLE during functional tasks 3/3  PT Treatment Day 3   Plan   Treatment/Interventions Functional transfer training; Therapeutic exercise;Elevations; Patient/family training;Equipment eval/education; Bed mobility;Gait training;LE strengthening/ROM; Endurance training; Compensatory technique education;Spoke to nursing;OT;Continued evaluation   Progress Slow progress, decreased activity tolerance   PT Frequency Other (Comment)  (4-5/wk)   Recommendation   Recommendation Short-term skilled PT   Equipment Recommended Walker   PT - OK to Discharge Yes   Additional Comments when medically stable   Melvena Massed, PT

## 2020-01-17 NOTE — SOCIAL WORK
[facility] will reach out to spouse for payment prior to discharge for IV abx  Fax [facility] and updated w/c script to [facility] today  Spouse had wanted a knee walker scooter, but PT was not commending this  It is unclear at this time if pt will need home 02  Will continue to follow  Rec a call from spouse asking for update and this was given  Rec a call spouse asking for update and this was given  Spouse stated they are putting in a ramp at home  Spouse feels pt would not be ready for discharge until Monday  Spouse is also asking for a knee walker scooter  Informed spouse PT did not recommend this equipment for pt  Spouse would still like this equipment  Will need to f/u with MD if script will be written  Spouse left message asking if IV abx can be changed to 0600, 1400 and 2200  Informed RN that spouse is questioning times of IV abx and if she could ask MD if IV abx could be changed from 8 hours to 1200 hours at home

## 2020-01-18 LAB
ANION GAP SERPL CALCULATED.3IONS-SCNC: 10 MMOL/L (ref 4–13)
ANION GAP SERPL CALCULATED.3IONS-SCNC: 12 MMOL/L (ref 4–13)
BUN SERPL-MCNC: 88 MG/DL (ref 5–25)
BUN SERPL-MCNC: 96 MG/DL (ref 5–25)
CALCIUM SERPL-MCNC: 8.6 MG/DL (ref 8.3–10.1)
CALCIUM SERPL-MCNC: 8.8 MG/DL (ref 8.3–10.1)
CHLORIDE SERPL-SCNC: 97 MMOL/L (ref 100–108)
CHLORIDE SERPL-SCNC: 98 MMOL/L (ref 100–108)
CO2 SERPL-SCNC: 22 MMOL/L (ref 21–32)
CO2 SERPL-SCNC: 24 MMOL/L (ref 21–32)
CREAT SERPL-MCNC: 2.48 MG/DL (ref 0.6–1.3)
CREAT SERPL-MCNC: 2.78 MG/DL (ref 0.6–1.3)
GFR SERPL CREATININE-BSD FRML MDRD: 23 ML/MIN/1.73SQ M
GFR SERPL CREATININE-BSD FRML MDRD: 26 ML/MIN/1.73SQ M
GLUCOSE SERPL-MCNC: 102 MG/DL (ref 65–140)
GLUCOSE SERPL-MCNC: 104 MG/DL (ref 65–140)
GLUCOSE SERPL-MCNC: 145 MG/DL (ref 65–140)
GLUCOSE SERPL-MCNC: 171 MG/DL (ref 65–140)
GLUCOSE SERPL-MCNC: 191 MG/DL (ref 65–140)
GLUCOSE SERPL-MCNC: 203 MG/DL (ref 65–140)
POTASSIUM SERPL-SCNC: 3.6 MMOL/L (ref 3.5–5.3)
POTASSIUM SERPL-SCNC: 3.8 MMOL/L (ref 3.5–5.3)
SODIUM SERPL-SCNC: 131 MMOL/L (ref 136–145)
SODIUM SERPL-SCNC: 132 MMOL/L (ref 136–145)

## 2020-01-18 PROCEDURE — 99232 SBSQ HOSP IP/OBS MODERATE 35: CPT | Performed by: INTERNAL MEDICINE

## 2020-01-18 PROCEDURE — 82948 REAGENT STRIP/BLOOD GLUCOSE: CPT

## 2020-01-18 PROCEDURE — 80048 BASIC METABOLIC PNL TOTAL CA: CPT

## 2020-01-18 PROCEDURE — 94660 CPAP INITIATION&MGMT: CPT

## 2020-01-18 PROCEDURE — 99232 SBSQ HOSP IP/OBS MODERATE 35: CPT

## 2020-01-18 PROCEDURE — 94760 N-INVAS EAR/PLS OXIMETRY 1: CPT

## 2020-01-18 PROCEDURE — 80048 BASIC METABOLIC PNL TOTAL CA: CPT | Performed by: INTERNAL MEDICINE

## 2020-01-18 RX ORDER — FUROSEMIDE 10 MG/ML
20 SYRINGE (ML) INJECTION CONTINUOUS
Status: DISCONTINUED | OUTPATIENT
Start: 2020-01-18 | End: 2020-01-19

## 2020-01-18 RX ADMIN — INSULIN LISPRO 2 UNITS: 100 INJECTION, SOLUTION INTRAVENOUS; SUBCUTANEOUS at 12:04

## 2020-01-18 RX ADMIN — CEFAZOLIN SODIUM 2000 MG: 2 SOLUTION INTRAVENOUS at 18:34

## 2020-01-18 RX ADMIN — ALLOPURINOL 300 MG: 100 TABLET ORAL at 08:46

## 2020-01-18 RX ADMIN — INSULIN GLARGINE 22 UNITS: 100 INJECTION, SOLUTION SUBCUTANEOUS at 21:40

## 2020-01-18 RX ADMIN — GUAIFENESIN 600 MG: 600 TABLET ORAL at 21:40

## 2020-01-18 RX ADMIN — METOPROLOL TARTRATE 50 MG: 50 TABLET, FILM COATED ORAL at 08:46

## 2020-01-18 RX ADMIN — FERROUS GLUCONATE 324 MG: 324 TABLET ORAL at 06:21

## 2020-01-18 RX ADMIN — METOPROLOL TARTRATE 50 MG: 50 TABLET, FILM COATED ORAL at 21:40

## 2020-01-18 RX ADMIN — APIXABAN 5 MG: 5 TABLET, FILM COATED ORAL at 08:46

## 2020-01-18 RX ADMIN — PRAVASTATIN SODIUM 40 MG: 40 TABLET ORAL at 16:48

## 2020-01-18 RX ADMIN — ACETAMINOPHEN 650 MG: 325 TABLET ORAL at 12:15

## 2020-01-18 RX ADMIN — AMLODIPINE BESYLATE 10 MG: 10 TABLET ORAL at 08:46

## 2020-01-18 RX ADMIN — Medication 20 MG/HR: at 12:04

## 2020-01-18 RX ADMIN — TAMSULOSIN HYDROCHLORIDE 0.4 MG: 0.4 CAPSULE ORAL at 16:48

## 2020-01-18 RX ADMIN — INSULIN LISPRO 2 UNITS: 100 INJECTION, SOLUTION INTRAVENOUS; SUBCUTANEOUS at 16:48

## 2020-01-18 RX ADMIN — FERROUS GLUCONATE 324 MG: 324 TABLET ORAL at 16:48

## 2020-01-18 RX ADMIN — APIXABAN 5 MG: 5 TABLET, FILM COATED ORAL at 18:34

## 2020-01-18 RX ADMIN — FUROSEMIDE 80 MG: 10 INJECTION, SOLUTION INTRAMUSCULAR; INTRAVENOUS at 08:46

## 2020-01-18 RX ADMIN — CEFAZOLIN SODIUM 2000 MG: 2 SOLUTION INTRAVENOUS at 01:45

## 2020-01-18 RX ADMIN — GUAIFENESIN 600 MG: 600 TABLET ORAL at 08:46

## 2020-01-18 RX ADMIN — MELATONIN TAB 3 MG 6 MG: 3 TAB at 21:40

## 2020-01-18 RX ADMIN — CEFAZOLIN SODIUM 2000 MG: 2 SOLUTION INTRAVENOUS at 10:29

## 2020-01-18 NOTE — PROGRESS NOTES
Progress Note - Podiatry  Ravichaim Boas 75 y o  male MRN: [id number]  Unit/Bed#: E4 -01 Encounter: [id number]    Assessment:  1  S/p I&D with removal of sesamoid on 01/12/20 and s/p left hallux amputation on 01/08/20 due to underlying clinical OM, abscess, and possible infectious tenosynovitis   2  Sepsis - POA  3  Bacteremia likely secondary to #1  4  DM type 2    Plan:  - left hallux incision site appears stable with intact sutures  No clinical signs of infection noted  Dressed with betadine paint and DSD    - Continue NWB to LLE   - PT/OT eval and treat   - Abx as per ID recommendations   - will update attending   - rest of care per primary service     Subjective/Objective   Chief Complaint:   Chief Complaint   Patient presents with    Fatigue     Pt with multiple complaints: sore throat, diarrhea, fatigue, fevers, tylenol at 0930  -ill contacts  Subjective: 75 y o  y/o male was seen and evaluated at bedside  Pt denies any acute events overnight  Blood pressure (!) 175/81, pulse 92, temperature 98 9 °F (37 2 °C), temperature source Temporal, resp  rate 18, weight 101 kg (223 lb 5 2 oz), SpO2 95 %  ,Body mass index is 32 33 kg/m²  Invasive Devices     Peripherally Inserted Central Catheter Line            PICC Line 35/58/17 Right Basilic 5 days          Peripheral Intravenous Line            Peripheral IV 01/16/20 Left Forearm 2 days                Physical Exam:   General: Alert, cooperative and no distress  Lungs: Non labored breathing  Heart: Positive S1, S2  Abdomen: Soft, non-tender  Extremity: Left foot incision sire appears stable with intact sutures  There is pitting edema noted on the lower extremities due to fluid overload  No clinical signs of infection noted  NO calf tenderness noted  NVS and MSK at baseline  Lab, Imaging and other studies:   I have personally reviewed pertinent lab results    , CBC: No results found for: WBC, HGB, HCT, MCV, PLT, ADJUSTEDWBC, MCH, MCHC, RDW, MPV, NRBC, CMP:   Lab Results   Component Value Date    SODIUM 132 (L) 01/18/2020    K 3 6 01/18/2020    CL 98 (L) 01/18/2020    CO2 22 01/18/2020    BUN 88 (H) 01/18/2020    CREATININE 2 48 (H) 01/18/2020    CALCIUM 8 8 01/18/2020    EGFR 26 01/18/2020       Imaging: I have personally reviewed pertinent films in PACS  EKG, Pathology, and Other Studies: I have personally reviewed pertinent reports

## 2020-01-18 NOTE — PLAN OF CARE
Problem: Potential for Falls  Goal: Patient will remain free of falls  Description  INTERVENTIONS:  - Assess patient frequently for physical needs  -  Identify cognitive and physical deficits and behaviors that affect risk of falls    -  [place] fall precautions as indicated by assessment   - Educate patient/family on patient safety including physical limitations  - Instruct patient to call for assistance with activity based on assessment  - Modify environment to reduce risk of injury  - Consider OT/PT consult to assist with strengthening/mobility  Outcome: Progressing     Problem: PAIN - ADULT  Goal: Verbalizes/displays adequate comfort level or baseline comfort level  Description  Interventions:  - Encourage patient to monitor pain and request assistance  - Assess pain using appropriate pain scale  - Administer analgesics based on type and severity of pain and evaluate response  - Implement non-pharmacological measures as appropriate and evaluate response  - Consider cultural and social influences on pain and pain management  - Notify physician/advanced practitioner if interventions unsuccessful or patient reports new pain  Outcome: Progressing     Problem: INFECTION - ADULT  Goal: Absence or prevention of progression during hospitalization  Description  INTERVENTIONS:  - Assess and monitor for signs and symptoms of infection  - Monitor lab/diagnostic results  - Monitor all insertion sites, i e  indwelling lines, tubes, and drains  - Administer medications as ordered  - Instruct and encourage patient and family to use good hand hygiene technique  - Identify and instruct in appropriate isolation precautions for identified infection/condition   Outcome: Progressing     Problem: SAFETY ADULT  Goal: Maintain or return to baseline ADL function  Description  INTERVENTIONS:  -  Assess patient's ability to carry out ADLs; assess patient's baseline for ADL function and identify physical deficits which impact ability to perform ADLs (bathing, care of mouth/teeth, toileting, grooming, dressing, etc )  - Assess/evaluate cause of self-care deficits   - Assess range of motion  - Assess patient's mobility; develop plan if impaired  - Assess patient's need for assistive devices and provide as appropriate  - Encourage maximum independence but intervene and supervise when necessary  - Involve family in performance of ADLs  - Assess for home care needs following discharge   - Consider OT consult to assist with ADL evaluation and planning for discharge  - Provide patient education as appropriate  Outcome: Progressing  Goal: Maintain or return mobility status to optimal level  Description  INTERVENTIONS:  - Assess patient's baseline mobility status (ambulation, transfers, stairs, etc )    - Identify cognitive and physical deficits and behaviors that affect mobility  - Identify mobility aids required to assist with transfers and/or ambulation (gait belt, sit-to-stand, lift, walker, cane, etc )  - [place] fall precautions as indicated by assessment  - Record patient progress and toleration of activity level on Mobility SBAR; progress patient to next Phase/Stage  - Instruct patient to call for assistance with activity based on assessment  - Consider rehabilitation consult to assist with strengthening/weightbearing, etc   Outcome: Progressing     Problem: DISCHARGE PLANNING  Goal: Discharge to home or other facility with appropriate resources  Description  INTERVENTIONS:  - Identify barriers to discharge w/patient and caregiver  - Arrange for needed discharge resources and transportation as appropriate  - Identify discharge learning needs (meds, wound care, etc )  - Arrange for interpretive services to assist at discharge as needed  - Refer to Case Management Department for coordinating discharge planning if the patient needs post-hospital services based on physician/advanced practitioner order or complex needs related to functional status, cognitive ability, or social support system  Outcome: Progressing     Problem: Knowledge Deficit  Goal: Patient/family/caregiver demonstrates understanding of disease process, treatment plan, medications, and discharge instructions  Description  Complete learning assessment and assess knowledge base    Interventions:  - Provide teaching at level of understanding  - Provide teaching via preferred learning methods  Outcome: Progressing     Problem: CARDIOVASCULAR - ADULT  Goal: Maintains optimal cardiac output and hemodynamic stability  Description  INTERVENTIONS:  - Monitor I/O, vital signs and rhythm  - Monitor for S/S and trends of decreased cardiac output  - Administer and titrate ordered vasoactive medications to optimize hemodynamic stability  - Assess quality of pulses, skin color and temperature  - Assess for signs of decreased coronary artery perfusion  - Instruct patient to report change in severity of symptoms  Outcome: Progressing  Goal: Absence of cardiac dysrhythmias or at baseline rhythm  Description  INTERVENTIONS:  - Continuous cardiac monitoring, vital signs, obtain 12 lead EKG if ordered  - Administer antiarrhythmic and heart rate control medications as ordered  - Monitor electrolytes and administer replacement therapy as ordered  Outcome: Progressing     Problem: RESPIRATORY - ADULT  Goal: Achieves optimal ventilation and oxygenation  Description  INTERVENTIONS:  - Assess for changes in respiratory status  - Assess for changes in mentation and behavior  - Position to facilitate oxygenation and minimize respiratory effort  - Oxygen administered by appropriate delivery if ordered  - Initiate smoking cessation education as indicated  - Encourage broncho-pulmonary hygiene including cough, deep breathe, Incentive Spirometry  - Assess the need for suctioning and aspirate as needed  - Assess and instruct to report SOB or any respiratory difficulty  - Respiratory Therapy support as indicated  Outcome: Progressing     Problem: Prexisting or High Potential for Compromised Skin Integrity  Goal: Skin integrity is maintained or improved  Description  INTERVENTIONS:  - Identify patients at risk for skin breakdown  - Assess and monitor skin integrity  - Assess and monitor nutrition and hydration status  - Monitor labs   - Assess for incontinence   - Turn and reposition patient  - Assist with mobility/ambulation  - Relieve pressure over bony prominences  - Avoid friction and shearing  - Provide appropriate hygiene as needed including keeping skin clean and dry  - Evaluate need for skin moisturizer/barrier cream  - Collaborate with interdisciplinary team   - Patient/family teaching  - Consider wound care consult   Outcome: Progressing     Problem: Nutrition/Hydration-ADULT  Goal: Nutrient/Hydration intake appropriate for improving, restoring or maintaining nutritional needs  Description  Monitor and assess patient's nutrition/hydration status for malnutrition  Collaborate with interdisciplinary team and initiate plan and interventions as ordered  Monitor patient's weight and dietary intake as ordered or per policy  Utilize nutrition screening tool and intervene as necessary  Determine patient's food preferences and provide high-protein, high-caloric foods as appropriate       INTERVENTIONS:  - Monitor oral intake, urinary output, labs, and treatment plans  - Assess nutrition and hydration status and recommend course of action  - Evaluate amount of meals eaten  - Assist patient with eating if necessary   - Allow adequate time for meals  - Recommend/ encourage appropriate diets, oral nutritional supplements, and vitamin/mineral supplements  - Order, calculate, and assess calorie counts as needed  - Recommend, monitor, and adjust tube feedings and TPN/PPN based on assessed needs  - Assess need for intravenous fluids  - Provide specific nutrition/hydration education as appropriate  - Include patient/family/caregiver in decisions related to nutrition  Outcome: Progressing     Problem: METABOLIC, FLUID AND ELECTROLYTES - ADULT  Goal: Glucose maintained within target range  Description  INTERVENTIONS:  - Monitor Blood Glucose as ordered  - Assess for signs and symptoms of hyperglycemia and hypoglycemia  - Administer ordered medications to maintain glucose within target range  - Assess nutritional intake and initiate nutrition service referral as needed  Outcome: Progressing     Problem: SKIN/TISSUE INTEGRITY - ADULT  Goal: Skin integrity remains intact  Description  INTERVENTIONS  - Identify patients at risk for skin breakdown  - Assess and monitor skin integrity  - Assess and monitor nutrition and hydration status  - Monitor labs (i e  albumin)  - Assess for incontinence   - Turn and reposition patient  - Assist with mobility/ambulation  - Relieve pressure over bony prominences  - Avoid friction and shearing  - Provide appropriate hygiene as needed including keeping skin clean and dry  - Evaluate need for skin moisturizer/barrier cream  - Collaborate with interdisciplinary team (i e  Nutrition, Rehabilitation, etc )   - Patient/family teaching  Outcome: Progressing  Goal: Incision(s), wounds(s) or drain site(s) healing without S/S of infection  Description  INTERVENTIONS  - Assess and document risk factors for skin impairment   - Assess and document dressing, incision, wound bed, drain sites and surrounding tissue  - Consider nutrition services referral as needed  - Oral mucous membranes remain intact  - Provide patient/ family education  Outcome: Progressing  Goal: Oral mucous membranes remain intact  Description  INTERVENTIONS  - Assess oral mucosa and hygiene practices  - Implement preventative oral hygiene regimen  - Implement oral medicated treatments as ordered  - Initiate Nutrition services referral as needed  Outcome: Progressing

## 2020-01-18 NOTE — PROGRESS NOTES
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 and s/p I and D with removal of sesmoid bone on 1/12/2020  Appreciate podiatry recommendations  He will continue ancef through 2/6/20  He will need repeat blood cultures 2 weeks after completion of antibiotic treatment  He will also need weekly cbc and creatinine while on iv antibiotics       2  Acute respiratory failure with hypoxia due to acute diastolic chf  Continue diuresis  Continue to wean oxygen as tolerated  He may require a home oxygen eval prior to discharge       3  Acute diastolic congestive heart failure  Appreciate cardiology recommendations  Pt was on IV lasix but creatinine keeps increasing  He was change to lasix gtt    4  Acute renal failure/ckd3- baseline creatinine is 1 1-1 4  Currently creatinine is 2 48  Arb is being held  Have accepted a higher baseline as trying to treat pts hypervolemia  Will check bmp in am  he was change to lasix gtt  Pt having difficulty urinating  Will check pvr       5  Type 2 diabetes with hyperglycemia- continue lantus at 22 units  Will continue insulin sliding scale  Pt will mostly likely not continue metformin at discharge       6  Obstructive sleep apnea     7  Paroxysmal atrial fibrillation- continue eliquis  Continue metoprolol     8  Hyponatremia- slight decrease from yesterday  Pt started on lasix gtt  Check bmp in am       9 History of complete heart block, status post pacemaker     10  Normocytic anemia- likely due to acute blood loss anemia from recent procedures and ckd  S/p 1 unit of prbc  H/h is stable  Will monitor       11  anxiety- continue ativan prn       dispo- continue diuresis  Subjective:   Pt seen and examined  Pt still fluid overloaded  He reports he is having difficulty urinating  He states he usually has to stand to urinate but he can't do that now due to his foot  He denies f/c no cp no worsening sob no n/v/d no abd pain  Objective:     Vitals: Blood pressure (!) 175/81, pulse 92, temperature 98 9 °F (37 2 °C), temperature source Temporal, resp  rate 18, weight 101 kg (223 lb 5 2 oz), SpO2 95 %  ,Body mass index is 32 33 kg/m²  Lab, Imaging and other studies:  Results from last 7 days   Lab Units 01/17/20  0643   WBC Thousand/uL 14 88*   HEMOGLOBIN g/dL 8 0*   HEMATOCRIT % 24 9*   PLATELETS Thousands/uL 477*     Results from last 7 days   Lab Units 01/18/20  0629   POTASSIUM mmol/L 3 6   CHLORIDE mmol/L 98*   CO2 mmol/L 22   BUN mg/dL 88*   CREATININE mg/dL 2 48*   CALCIUM mg/dL 8 8         Lab Results   Component Value Date    BLOODCX No Growth After 5 Days  01/09/2020    BLOODCX No Growth After 5 Days  01/09/2020    BLOODCX Staphylococcus aureus (A) 01/07/2020    BLOODCX Staphylococcus aureus (A) 01/07/2020    WOUNDCULT 4+ Growth of Escherichia coli 07/19/2017    WOUNDCULT 1+ Growth of Mixed Skin Jem 07/19/2017    WOUNDCULT Growth in Broth culture only Citrobacter freundii 05/02/2017    SPUTUMCULTUR 2+ Growth of  05/03/2019    SPUTUMCULTUR  05/03/2019     Commensal respiratory jem only; No significant growth of Staph aureus/MRSA or Pseudomonas aeruginosa       Scheduled Meds:   Current Facility-Administered Medications:  acetaminophen 650 mg Oral Q6H PRN Melissa Ambron, DO    allopurinol 300 mg Oral QAM Viola Flatten, DPM    amLODIPine 10 mg Oral Daily Viola Flatten, DPM    apixaban 5 mg Oral BID Melissa Ambron, DO    cefazolin 2,000 mg Intravenous Q8H Viola Flatten, DPM Last Rate: Stopped (01/18/20 1059)   ferrous gluconate 324 mg Oral BID AC Melissa Ambron, DO    furosemide 20 mg/hr Intravenous Continuous Sherlean Peal, DO Last Rate: 20 mg/hr (01/18/20 1204)   guaiFENesin 600 mg Oral BID Jm Riches, DPM    insulin glargine 22 Units Subcutaneous HS Melissa Ambron, DO    insulin lispro 2-12 Units Subcutaneous TID AC Jm Riches, DPM    ipratropium-albuterol 3 mL Nebulization Q6H PRN Jm Riches, DPM    LORazepam 0 5 mg Oral Q8H PRN Melissa Ambron, DO    melatonin 6 mg Oral HS Jm Riches, DPM    metoprolol tartrate 50 mg Oral Q12H [address] Jm Riches, DPM    oxyCODONE 5 mg Oral Q4H PRN Jm Riches, DPM    pravastatin 40 mg Oral Daily With Carrillo Fillmore Kasey, DPM    tamsulosin 0 4 mg Oral Daily With Carrillo Fillmore Kasey, DPM      Continuous Infusions:   furosemide 20 mg/hr Last Rate: 20 mg/hr (01/18/20 1204)     PRN Meds:   acetaminophen    ipratropium-albuterol    LORazepam    oxyCODONE      Physical exam:  Physical Exam  General appearance: alert and oriented, in no acute distress  Head: Normocephalic, without obvious abnormality, atraumatic  Eyes: conjunctivae/corneas clear  PERRL, EOM's intact  Fundi benign    Neck: no adenopathy, no carotid bruit, no JVD, supple, symmetrical, trachea midline and thyroid not enlarged, symmetric, no tenderness/mass/nodules  Lungs: crackles at bases bilateral  Heart: regular rate and rhythm, S1, S2 normal, no murmur, click, rub or gallop  Abdomen: soft, non-tender; bowel sounds normal; no masses,  no organomegaly  Extremities: edema +1 edema bilateral lower ext  Pulses: 2+ and symmetric  Skin: bandage left lower ext c/d/i  Neurologic: Mental status: Alert, oriented, thought content appropriate      VTE Pharmacologic Prophylaxis: eliquis  VTE Mechanical Prophylaxis: sequential compression device    Counseling / Coordination of Care  Total floor / unit time spent today 20 minutes     Current Length of Stay: 11 day(s)    Current Patient Status: Inpatient     Code Status: Level 1 - Full Code

## 2020-01-18 NOTE — PROGRESS NOTES
Progress Note - Cardiology   Germaine Esquivel 75 y o  male MRN: [id number]  Unit/Bed#: E4 -01 Encounter: [id number]      Assessment/Recommendations/Discussion:   1  Acute hypoxic respiratory failure with suspected pneumonia plus or minus CHF  2  Chronic diastolic heart failure on Lasix 40 b i d  As outpatient  3  Moderate aortic and mitral stenosis  4  Complete heart block status post pacemaker  5  Hypertension  6  Dyslipidemia  7  Acute on chronic kidney injury  8  Elevated troponin  9  Type 2 diabetes  10  Paroxysmal atrial fibrillation on Eliquis and Lopressor  11  Hypoalbuminemia  12  Osteomyelitis of left foot       PLAN   Still volume overloaded, creatinine worsen, not making much urine   Would place on Lasix drip today   Continue with oxygen try to wean off as able   Follow electrolytes, renal function   Continue amlodipine, Eliquis, Lopressor, statin, antibiotics per Infectious Disease /primary team      Subjective:   HPI  Renal function slightly worse than yesterday, not making much urine  Review of Systems: As noted in HPI  Rest of ROS is negative  Vitals:   BP (!) 175/81 (BP Location: Left arm)   Pulse 92   Temp 98 9 °F (37 2 °C) (Temporal)   Resp 18   Wt 101 kg (223 lb 5 2 oz)   SpO2 95%   BMI 32 33 kg/m²   Vitals:    01/17/20 0532 01/18/20 0550   Weight: 99 5 kg (219 lb 5 7 oz) 101 kg (223 lb 5 2 oz)       Intake/Output Summary (Last 24 hours) at 1/18/2020 1053  Last data filed at 1/18/2020 0850  Gross per 24 hour   Intake 550 ml   Output 300 ml   Net 250 ml       Physical Exam:  General appearance: alert and oriented, in no acute distress, obese male  Head: Normocephalic, without obvious abnormality, atraumatic  Eyes: conjunctivae/corneas clear  Anicteric    Neck: no adenopathy, no carotid bruit, no JVD  Lungs:  Has some rales at the bilateral lung fields in the base  Heart: regular rate and rhythm, S1, S2 normal, no murmur, no click, rub or gallop  Abdomen: soft, non-tender; bowel sounds normal; no masses,  no organomegaly  Extremities:  Bilateral lower extremity edema, left lower extremity 1st toe amputation  Skin: Skin color, texture, turgor normal  No rashes or lesions     Lab Results:  Results from last 7 days   Lab Units 01/17/20  0643   WBC Thousand/uL 14 88*   HEMOGLOBIN g/dL 8 0*   HEMATOCRIT % 24 9*   PLATELETS Thousands/uL 477*     Results from last 7 days   Lab Units 01/18/20  0629   POTASSIUM mmol/L 3 6   CHLORIDE mmol/L 98*   CO2 mmol/L 22   BUN mg/dL 88*   CREATININE mg/dL 2 48*   CALCIUM mg/dL 8 8     Results from last 7 days   Lab Units 01/18/20  0629   POTASSIUM mmol/L 3 6   CHLORIDE mmol/L 98*   CO2 mmol/L 22   BUN mg/dL 88*   CREATININE mg/dL 2 48*   CALCIUM mg/dL 8 8           Medications:    Current Facility-Administered Medications:     acetaminophen (TYLENOL) tablet 650 mg, 650 mg, Oral, Q6H PRN, Melissa Ambron, DO, 650 mg at 01/15/20 1608    allopurinol (ZYLOPRIM) tablet 300 mg, 300 mg, Oral, QAM, Carmitan Macariouse, DPM, 300 mg at 01/18/20 0846    amLODIPine (NORVASC) tablet 10 mg, 10 mg, Oral, Daily, Rosan Macariouse, DPM, 10 mg at 01/18/20 0846    apixaban (ELIQUIS) tablet 5 mg, 5 mg, Oral, BID, Melissa Ambron, DO, 5 mg at 01/18/20 0846    ceFAZolin (ANCEF) IVPB (premix) 2,000 mg, 2,000 mg, Intravenous, Q8H, Gee Sortouse, DPM, Last Rate: 100 mL/hr at 01/18/20 1029, 2,000 mg at 01/18/20 1029    ferrous gluconate (FERGON) tablet 324 mg, 324 mg, Oral, BID AC, Melissa Ambron, DO, 324 mg at 01/18/20 0621    furosemide (LASIX) injection 80 mg, 80 mg, Intravenous, BID (diuretic), Rodríguez Corley MD, 80 mg at 01/18/20 0846    guaiFENesin (MUCINEX) 12 hr tablet 600 mg, 600 mg, Oral, BID, Rosan Clause, DPM, 600 mg at 01/18/20 0846    insulin glargine (LANTUS) subcutaneous injection 22 Units 0 22 mL, 22 Units, Subcutaneous, HS, Melissa Alonso DO, 22 Units at 01/17/20 2139    insulin lispro (HumaLOG) 100 units/mL subcutaneous injection 2-12 Units, 2-12 Units, Subcutaneous, TID AC, 4 Units at 01/17/20 1714 **AND** Fingerstick Glucose (POCT), , , TID AC, Rosan Clause, DPM    ipratropium-albuterol (DUO-NEB) 0 5-2 5 mg/3 mL inhalation solution 3 mL, 3 mL, Nebulization, Q6H PRN, Rosan Clause, DPM, 3 mL at 01/07/20 2025    LORazepam (ATIVAN) tablet 0 5 mg, 0 5 mg, Oral, Q8H PRN, Melissa Ambron, DO, 0 5 mg at 01/17/20 1402    melatonin tablet 6 mg, 6 mg, Oral, HS, Rosan Clause, DPM, 6 mg at 01/17/20 2138    metoprolol tartrate (LOPRESSOR) tablet 50 mg, 50 mg, Oral, Q12H [facility], Rosan Clause, DPM, 50 mg at 01/18/20 0846    oxyCODONE (ROXICODONE) IR tablet 5 mg, 5 mg, Oral, Q4H PRN, Rosan Clause, DPM, 5 mg at 01/12/20 1709    pravastatin (PRAVACHOL) tablet 40 mg, 40 mg, Oral, Daily With Kent Grover, DPM, 40 mg at 01/17/20 1714    tamsulosin (FLOMAX) capsule 0 4 mg, 0 4 mg, Oral, Daily With Kent Grover, DPM, 0 4 mg at 01/17/20 1714    This note was completed in part utilizing [employer] Software  Grammatical errors, random word insertions, spelling mistakes, and incomplete sentences may be an occasional consequence of this system secondary to software limitations, ambient noise, and hardware issues  If you have any questions or concerns about the content, text, or information contained within the body of this dictation, please contact the provider for clarification      Wilfredo Han DO, [facility]  1/18/2020 10:53 AM

## 2020-01-19 ENCOUNTER — APPOINTMENT (INPATIENT)
Dept: ULTRASOUND IMAGING | Facility: HOSPITAL | Age: 65
DRG: 853 | End: 2020-01-19
Payer: MEDICARE

## 2020-01-19 LAB
ANION GAP SERPL CALCULATED.3IONS-SCNC: 10 MMOL/L (ref 4–13)
ANION GAP SERPL CALCULATED.3IONS-SCNC: 11 MMOL/L (ref 4–13)
ANION GAP SERPL CALCULATED.3IONS-SCNC: 9 MMOL/L (ref 4–13)
BACTERIA UR QL AUTO: ABNORMAL /HPF
BILIRUB UR QL STRIP: NEGATIVE
BUN SERPL-MCNC: 102 MG/DL (ref 5–25)
BUN SERPL-MCNC: 108 MG/DL (ref 5–25)
BUN SERPL-MCNC: 110 MG/DL (ref 5–25)
CALCIUM SERPL-MCNC: 8.1 MG/DL (ref 8.3–10.1)
CALCIUM SERPL-MCNC: 8.2 MG/DL (ref 8.3–10.1)
CALCIUM SERPL-MCNC: 8.3 MG/DL (ref 8.3–10.1)
CHLORIDE SERPL-SCNC: 95 MMOL/L (ref 100–108)
CHLORIDE SERPL-SCNC: 96 MMOL/L (ref 100–108)
CHLORIDE SERPL-SCNC: 96 MMOL/L (ref 100–108)
CLARITY UR: ABNORMAL
CO2 SERPL-SCNC: 23 MMOL/L (ref 21–32)
CO2 SERPL-SCNC: 23 MMOL/L (ref 21–32)
CO2 SERPL-SCNC: 24 MMOL/L (ref 21–32)
COLOR UR: YELLOW
CREAT SERPL-MCNC: 2.77 MG/DL (ref 0.6–1.3)
CREAT SERPL-MCNC: 2.9 MG/DL (ref 0.6–1.3)
CREAT SERPL-MCNC: 2.91 MG/DL (ref 0.6–1.3)
ERYTHROCYTE [DISTWIDTH] IN BLOOD BY AUTOMATED COUNT: 14.6 % (ref 11.6–15.1)
FINE GRAN CASTS URNS QL MICRO: ABNORMAL /LPF
GFR SERPL CREATININE-BSD FRML MDRD: 21 ML/MIN/1.73SQ M
GFR SERPL CREATININE-BSD FRML MDRD: 22 ML/MIN/1.73SQ M
GFR SERPL CREATININE-BSD FRML MDRD: 23 ML/MIN/1.73SQ M
GLUCOSE SERPL-MCNC: 162 MG/DL (ref 65–140)
GLUCOSE SERPL-MCNC: 163 MG/DL (ref 65–140)
GLUCOSE SERPL-MCNC: 170 MG/DL (ref 65–140)
GLUCOSE SERPL-MCNC: 196 MG/DL (ref 65–140)
GLUCOSE SERPL-MCNC: 200 MG/DL (ref 65–140)
GLUCOSE SERPL-MCNC: 268 MG/DL (ref 65–140)
GLUCOSE SERPL-MCNC: 306 MG/DL (ref 65–140)
GLUCOSE UR STRIP-MCNC: NEGATIVE MG/DL
HCT VFR BLD AUTO: 28.7 % (ref 36.5–49.3)
HGB BLD-MCNC: 9.4 G/DL (ref 12–17)
HGB UR QL STRIP.AUTO: ABNORMAL
KETONES UR STRIP-MCNC: ABNORMAL MG/DL
LEUKOCYTE ESTERASE UR QL STRIP: NEGATIVE
MCH RBC QN AUTO: 29.9 PG (ref 26.8–34.3)
MCHC RBC AUTO-ENTMCNC: 32.8 G/DL (ref 31.4–37.4)
MCV RBC AUTO: 91 FL (ref 82–98)
NITRITE UR QL STRIP: NEGATIVE
NON-SQ EPI CELLS URNS QL MICRO: ABNORMAL /HPF
OSMOLALITY UR/SERPL-RTO: 311 MMOL/KG (ref 282–298)
OSMOLALITY UR: 336 MMOL/KG
PH UR STRIP.AUTO: 5.5 [PH]
PLATELET # BLD AUTO: 406 THOUSANDS/UL (ref 149–390)
PMV BLD AUTO: 9.2 FL (ref 8.9–12.7)
POTASSIUM SERPL-SCNC: 3.7 MMOL/L (ref 3.5–5.3)
PROT UR STRIP-MCNC: >=300 MG/DL
RBC # BLD AUTO: 3.14 MILLION/UL (ref 3.88–5.62)
RBC #/AREA URNS AUTO: ABNORMAL /HPF
SODIUM 24H UR-SCNC: 14 MOL/L
SODIUM SERPL-SCNC: 128 MMOL/L (ref 136–145)
SODIUM SERPL-SCNC: 129 MMOL/L (ref 136–145)
SODIUM SERPL-SCNC: 130 MMOL/L (ref 136–145)
SP GR UR STRIP.AUTO: 1.02 (ref 1–1.03)
TSH SERPL DL<=0.05 MIU/L-ACNC: 0.5 UIU/ML (ref 0.36–3.74)
URATE SERPL-MCNC: 5.3 MG/DL (ref 4.2–8)
UROBILINOGEN UR QL STRIP.AUTO: 0.2 E.U./DL
WBC # BLD AUTO: 14.89 THOUSAND/UL (ref 4.31–10.16)
WBC #/AREA URNS AUTO: ABNORMAL /HPF

## 2020-01-19 PROCEDURE — 84550 ASSAY OF BLOOD/URIC ACID: CPT | Performed by: PHYSICIAN ASSISTANT

## 2020-01-19 PROCEDURE — 99232 SBSQ HOSP IP/OBS MODERATE 35: CPT | Performed by: INTERNAL MEDICINE

## 2020-01-19 PROCEDURE — 84300 ASSAY OF URINE SODIUM: CPT | Performed by: PHYSICIAN ASSISTANT

## 2020-01-19 PROCEDURE — 94660 CPAP INITIATION&MGMT: CPT

## 2020-01-19 PROCEDURE — 80048 BASIC METABOLIC PNL TOTAL CA: CPT | Performed by: INTERNAL MEDICINE

## 2020-01-19 PROCEDURE — 83935 ASSAY OF URINE OSMOLALITY: CPT | Performed by: PHYSICIAN ASSISTANT

## 2020-01-19 PROCEDURE — 87205 SMEAR GRAM STAIN: CPT | Performed by: PHYSICIAN ASSISTANT

## 2020-01-19 PROCEDURE — 76770 US EXAM ABDO BACK WALL COMP: CPT

## 2020-01-19 PROCEDURE — 99222 1ST HOSP IP/OBS MODERATE 55: CPT | Performed by: INTERNAL MEDICINE

## 2020-01-19 PROCEDURE — 82948 REAGENT STRIP/BLOOD GLUCOSE: CPT

## 2020-01-19 PROCEDURE — 81001 URINALYSIS AUTO W/SCOPE: CPT | Performed by: PHYSICIAN ASSISTANT

## 2020-01-19 PROCEDURE — 0T9B70Z DRAINAGE OF BLADDER WITH DRAINAGE DEVICE, VIA NATURAL OR ARTIFICIAL OPENING: ICD-10-PCS | Performed by: INTERNAL MEDICINE

## 2020-01-19 PROCEDURE — 83930 ASSAY OF BLOOD OSMOLALITY: CPT | Performed by: PHYSICIAN ASSISTANT

## 2020-01-19 PROCEDURE — 85027 COMPLETE CBC AUTOMATED: CPT | Performed by: INTERNAL MEDICINE

## 2020-01-19 PROCEDURE — 84443 ASSAY THYROID STIM HORMONE: CPT | Performed by: PHYSICIAN ASSISTANT

## 2020-01-19 PROCEDURE — 94640 AIRWAY INHALATION TREATMENT: CPT

## 2020-01-19 PROCEDURE — 99232 SBSQ HOSP IP/OBS MODERATE 35: CPT

## 2020-01-19 PROCEDURE — 80048 BASIC METABOLIC PNL TOTAL CA: CPT

## 2020-01-19 RX ORDER — BUMETANIDE 0.25 MG/ML
1 INJECTION INTRAMUSCULAR; INTRAVENOUS CONTINUOUS
Status: DISPENSED | OUTPATIENT
Start: 2020-01-19 | End: 2020-01-26

## 2020-01-19 RX ORDER — DOCUSATE SODIUM 100 MG/1
100 CAPSULE, LIQUID FILLED ORAL 2 TIMES DAILY
Status: DISCONTINUED | OUTPATIENT
Start: 2020-01-19 | End: 2020-01-29 | Stop reason: HOSPADM

## 2020-01-19 RX ORDER — AMLODIPINE BESYLATE 10 MG/1
10 TABLET ORAL DAILY
Status: DISCONTINUED | OUTPATIENT
Start: 2020-01-20 | End: 2020-01-29 | Stop reason: HOSPADM

## 2020-01-19 RX ADMIN — APIXABAN 5 MG: 5 TABLET, FILM COATED ORAL at 09:36

## 2020-01-19 RX ADMIN — CEFAZOLIN SODIUM 2000 MG: 2 SOLUTION INTRAVENOUS at 11:15

## 2020-01-19 RX ADMIN — METOPROLOL TARTRATE 50 MG: 50 TABLET, FILM COATED ORAL at 09:36

## 2020-01-19 RX ADMIN — CEFAZOLIN SODIUM 2000 MG: 2 SOLUTION INTRAVENOUS at 01:50

## 2020-01-19 RX ADMIN — CEFAZOLIN SODIUM 2000 MG: 2 SOLUTION INTRAVENOUS at 18:24

## 2020-01-19 RX ADMIN — Medication 0.5 MG/HR: at 12:47

## 2020-01-19 RX ADMIN — INSULIN LISPRO 2 UNITS: 100 INJECTION, SOLUTION INTRAVENOUS; SUBCUTANEOUS at 09:36

## 2020-01-19 RX ADMIN — GUAIFENESIN 600 MG: 600 TABLET ORAL at 21:43

## 2020-01-19 RX ADMIN — Medication 20 MG/HR: at 02:39

## 2020-01-19 RX ADMIN — MELATONIN TAB 3 MG 6 MG: 3 TAB at 21:43

## 2020-01-19 RX ADMIN — ALLOPURINOL 300 MG: 100 TABLET ORAL at 09:36

## 2020-01-19 RX ADMIN — AMLODIPINE BESYLATE 10 MG: 10 TABLET ORAL at 09:36

## 2020-01-19 RX ADMIN — INSULIN LISPRO 2 UNITS: 100 INJECTION, SOLUTION INTRAVENOUS; SUBCUTANEOUS at 17:04

## 2020-01-19 RX ADMIN — FERROUS GLUCONATE 324 MG: 324 TABLET ORAL at 17:04

## 2020-01-19 RX ADMIN — LORAZEPAM 0.5 MG: 1 TABLET ORAL at 12:12

## 2020-01-19 RX ADMIN — DOCUSATE SODIUM 100 MG: 100 CAPSULE, LIQUID FILLED ORAL at 09:36

## 2020-01-19 RX ADMIN — FERROUS GLUCONATE 324 MG: 324 TABLET ORAL at 06:30

## 2020-01-19 RX ADMIN — PRAVASTATIN SODIUM 40 MG: 40 TABLET ORAL at 17:04

## 2020-01-19 RX ADMIN — INSULIN GLARGINE 22 UNITS: 100 INJECTION, SOLUTION SUBCUTANEOUS at 21:49

## 2020-01-19 RX ADMIN — GUAIFENESIN 600 MG: 600 TABLET ORAL at 09:36

## 2020-01-19 RX ADMIN — DOCUSATE SODIUM 100 MG: 100 CAPSULE, LIQUID FILLED ORAL at 17:04

## 2020-01-19 RX ADMIN — IPRATROPIUM BROMIDE AND ALBUTEROL SULFATE 3 ML: 2.5; .5 SOLUTION RESPIRATORY (INHALATION) at 12:43

## 2020-01-19 RX ADMIN — METOPROLOL TARTRATE 50 MG: 50 TABLET, FILM COATED ORAL at 21:43

## 2020-01-19 RX ADMIN — APIXABAN 5 MG: 5 TABLET, FILM COATED ORAL at 17:04

## 2020-01-19 RX ADMIN — TAMSULOSIN HYDROCHLORIDE 0.4 MG: 0.4 CAPSULE ORAL at 17:04

## 2020-01-19 RX ADMIN — INSULIN LISPRO 4 UNITS: 100 INJECTION, SOLUTION INTRAVENOUS; SUBCUTANEOUS at 11:14

## 2020-01-19 NOTE — PROGRESS NOTES
Progress Note - Cardiology   Jennifer Reardon 75 y o  male MRN: [id number]  Unit/Bed#: E4 -01 Encounter: [id number]      Assessment/Recommendations/Discussion:   1  Acute hypoxic respiratory failure with suspected pneumonia plus or minus CHF  2  Chronic diastolic heart failure on Lasix 40 b i d  As outpatient  3  Moderate aortic and mitral stenosis  4  Complete heart block status post pacemaker  5  Hypertension  6  Dyslipidemia  7  Acute on chronic kidney injury  8  Elevated troponin  9  Type 2 diabetes  10  Paroxysmal atrial fibrillation on Eliquis and Lopressor  11  Hypoalbuminemia  12  Osteomyelitis of left foot    PLAN   Jones was placed today with large urine output greater than 800 cc  [facility] is on board now  Urine studies were ordered   Would switch to Bumex drip   Follow electrolytes, renal function, ordered BMPs q6 with bumex drip   Continue amlodipine, Eliquis, Lopressor, statin, antibiotics per Infectious Disease and primary team    Subjective:   HPI  Creatinine worsened with Lasix drip, not putting out much urine, appeared to be obstructed  Jones was just placed with greater than 800 cc urine output  Nephrology on board now  Review of Systems: As noted in HPI  Rest of ROS is negative  Vitals:   /70 (BP Location: Right arm)   Pulse 80   Temp 98 9 °F (37 2 °C) (Temporal)   Resp 20   Wt 103 kg (226 lb 3 1 oz)   SpO2 96%   BMI 32 75 kg/m²   Vitals:    01/18/20 0550 01/19/20 0600   Weight: 101 kg (223 lb 5 2 oz) 103 kg (226 lb 3 1 oz)       Intake/Output Summary (Last 24 hours) at 1/19/2020 1137  Last data filed at 1/19/2020 0750  Gross per 24 hour   Intake 117 9 ml   Output 1219 ml   Net -1101 1 ml       Physical Exam:  General appearance: alert and oriented, in no acute distress  Head: Normocephalic, without obvious abnormality, atraumatic  Eyes: conjunctivae/corneas clear  Anicteric    Neck: no adenopathy, no carotid bruit, no JVD  Lungs: clear to auscultation bilaterally  Heart: regular rate and rhythm, S1, S2 normal, no murmur, no click, rub or gallop  Abdomen: soft, non-tender; bowel sounds normal; no masses,  no organomegaly  Extremities:  Bilateral lower extremity edema, left lower extremity with  toe amputation dressing intact  Skin: Skin color, texture, turgor normal  No rashes or lesions     Lab Results:  Results from last 7 days   Lab Units 01/19/20  0439   WBC Thousand/uL 14 89*   HEMOGLOBIN g/dL 9 4*   HEMATOCRIT % 28 7*   PLATELETS Thousands/uL 406*     Results from last 7 days   Lab Units 01/19/20  0439   POTASSIUM mmol/L 3 7   CHLORIDE mmol/L 95*   CO2 mmol/L 23   BUN mg/dL 102*   CREATININE mg/dL 2 77*   CALCIUM mg/dL 8 3     Results from last 7 days   Lab Units 01/19/20  0439   POTASSIUM mmol/L 3 7   CHLORIDE mmol/L 95*   CO2 mmol/L 23   BUN mg/dL 102*   CREATININE mg/dL 2 77*   CALCIUM mg/dL 8 3           Medications:    Current Facility-Administered Medications:     acetaminophen (TYLENOL) tablet 650 mg, 650 mg, Oral, Q6H PRN, Melissa Ambron, DO, 650 mg at 01/18/20 1215    allopurinol (ZYLOPRIM) tablet 300 mg, 300 mg, Oral, QAM, Larissa Stokes DPM, 300 mg at 01/19/20 0936    [START ON 1/20/2020] amLODIPine (NORVASC) tablet 10 mg, 10 mg, Oral, Daily, Su Stuart [address], PA-C    apixaban (ELIQUIS) tablet 5 mg, 5 mg, Oral, BID, Melissa Ambron, DO, 5 mg at 01/19/20 0936    ceFAZolin (ANCEF) IVPB (premix) 2,000 mg, 2,000 mg, Intravenous, Q8H, Larissa Stokes DPM, Last Rate: 100 mL/hr at 01/19/20 1115, 2,000 mg at 01/19/20 1115    docusate sodium (COLACE) capsule 100 mg, 100 mg, Oral, BID, Melissa Ambron, DO, 100 mg at 01/19/20 0936    ferrous gluconate (FERGON) tablet 324 mg, 324 mg, Oral, BID AC, Melissa Ambron, DO, 324 mg at 01/19/20 0630    guaiFENesin (MUCINEX) 12 hr tablet 600 mg, 600 mg, Oral, BID, Larissa Stokes DPM, 600 mg at 01/19/20 0936    insulin glargine (LANTUS) subcutaneous injection 22 Units 0 22 mL, 22 Units, Subcutaneous, HS, Melissa Alonso DO, 22 Units at 01/18/20 2140    insulin lispro (HumaLOG) 100 units/mL subcutaneous injection 2-12 Units, 2-12 Units, Subcutaneous, TID AC, 4 Units at 01/19/20 1114 **AND** Fingerstick Glucose (POCT), , , TID AC, Larissa Stokes DPM    ipratropium-albuterol (DUO-NEB) 0 5-2 5 mg/3 mL inhalation solution 3 mL, 3 mL, Nebulization, Q6H PRN, Larissa Stokes DPM, 3 mL at 01/07/20 2025    LORazepam (ATIVAN) tablet 0 5 mg, 0 5 mg, Oral, Q8H PRN, Melissa Alonso DO, 0 5 mg at 01/17/20 1402    melatonin tablet 6 mg, 6 mg, Oral, HS, Larissa Stokes DPM, 6 mg at 01/18/20 2140    metoprolol tartrate (LOPRESSOR) tablet 50 mg, 50 mg, Oral, Q12H [address], Larissa Stokes DPM, 50 mg at 01/19/20 0936    oxyCODONE (ROXICODONE) IR tablet 5 mg, 5 mg, Oral, Q4H PRN, Larissa Stokes DPM, 5 mg at 01/12/20 1709    pravastatin (PRAVACHOL) tablet 40 mg, 40 mg, Oral, Daily With Teetee Crawley DPM, 40 mg at 01/18/20 1648    tamsulosin (FLOMAX) capsule 0 4 mg, 0 4 mg, Oral, Daily With Teetee Crawley DPM, 0 4 mg at 01/18/20 1648    This note was completed in part utilizing M-Modal Fluency Direct Software  Grammatical errors, random word insertions, spelling mistakes, and incomplete sentences may be an occasional consequence of this system secondary to software limitations, ambient noise, and hardware issues  If you have any questions or concerns about the content, text, or information contained within the body of this dictation, please contact the provider for clarification      Kelly Hinton DO, [facility]  1/19/2020 11:37 AM

## 2020-01-19 NOTE — PROGRESS NOTES
[address] Internal Medicine Progress Note  Patient: Bhupendra Sherwood 75 y o  male   MRN: [id number]  PCP: Hilda Whitaker,   Unit/Bed#: E4 -01 Encounter: [id number]  Date Of Visit: 01/19/20      Assessment/plan  1  Severe sepsis due to MSSA bacteremia from left hallux wound- appreciate ID recommendations  Continue ancef  S/p picc  S/p ELBERT that was negative for vegetation  He is s/p Left hallux amputation on 1/8/2020 and s/p I and D with removal of sesmoid bone on 1/12/2020  Appreciate podiatry recommendations  He will continue ancef through 2/6/20  He will need repeat blood cultures 2 weeks after completion of antibiotic treatment  He will also need weekly cbc and creatinine while on iv antibiotics       2  Acute respiratory failure with hypoxia due to acute diastolic chf  Continue diuresis  Continue to wean oxygen as tolerated  He may require a home oxygen eval prior to discharge       3  Acute diastolic congestive heart failure  Appreciate cardiology recommendations  Pt was on IV lasix but creatinine keeps increasing  He was change to lasix gtt  Creatinine worsened to 2 7  Will hold on lasix gtt due to hyponatremia and elevated creatinine  Question if trial of albumin may be appropriate       4  Acute renal failure/ckd3- baseline creatinine is 1 1-1 4  Currently creatinine is 2 7  Arb is being held  Have accepted a higher baseline as trying to treat pts hypervolemia  Pt was changed to lasix gtt and creatinine increased to current 2 7  Will consult nephrology  Question if trial of albumin may be appropriate       5  Type 2 diabetes with hyperglycemia- continue lantus at 22 units  Will continue insulin sliding scale  Pt will mostly likely not continue metformin at discharge       6  Obstructive sleep apnea- cpap qhs     7  Paroxysmal atrial fibrillation- continue eliquis  Continue metoprolol     8  Hyponatremia- sodium decreased to 128  Will consult nephrology  Will hold lasix gtt   Will check sodium at 1400     9 History of complete heart block, status post pacemaker     10  Normocytic anemia- likely due to acute blood loss anemia from recent procedures and ckd  S/p 1 unit of prbc  H/h is stable  Will monitor       11  anxiety- continue ativan prn      12  Constipation- start colace     Subjective:   Pt seen and examined  Pt seen and examined  Pt is currently on cpap  He states he is constipated  His right leg has improved with decreased swelling  His left leg is still swollen  He denies f/c no cp no worsening sob no n/v/d no abd pain  He does follow with nephrology outpt  Objective:     Vitals: Blood pressure 165/70, pulse 80, temperature 98 9 °F (37 2 °C), temperature source Temporal, resp  rate 20, weight 103 kg (226 lb 3 1 oz), SpO2 96 %  ,Body mass index is 32 75 kg/m²  Lab, Imaging and other studies:  Results from last 7 days   Lab Units 01/19/20  0439   WBC Thousand/uL 14 89*   HEMOGLOBIN g/dL 9 4*   HEMATOCRIT % 28 7*   PLATELETS Thousands/uL 406*     Results from last 7 days   Lab Units 01/19/20  0439   POTASSIUM mmol/L 3 7   CHLORIDE mmol/L 95*   CO2 mmol/L 23   BUN mg/dL 102*   CREATININE mg/dL 2 77*   CALCIUM mg/dL 8 3         Lab Results   Component Value Date    BLOODCX No Growth After 5 Days  01/09/2020    BLOODCX No Growth After 5 Days  01/09/2020    BLOODCX Staphylococcus aureus (A) 01/07/2020    BLOODCX Staphylococcus aureus (A) 01/07/2020    WOUNDCULT 4+ Growth of Escherichia coli 07/19/2017    WOUNDCULT 1+ Growth of Mixed Skin Jem 07/19/2017    WOUNDCULT Growth in Broth culture only Citrobacter freundii 05/02/2017    SPUTUMCULTUR 2+ Growth of  05/03/2019    SPUTUMCULTUR  05/03/2019     Commensal respiratory jem only; No significant growth of Staph aureus/MRSA or Pseudomonas aeruginosa       Scheduled Meds:   Current Facility-Administered Medications:  acetaminophen 650 mg Oral Q6H PRN Melissa Ambron, DO    allopurinol 300 mg Oral QAM Rinda Clarity, DPM    amLODIPine 10 mg Oral Daily Lola Mclean Kasey, DPM    apixaban 5 mg Oral BID Melissa Ambron, DO    cefazolin 2,000 mg Intravenous Q8H Fredrick Hill DPM Last Rate: Stopped (01/19/20 0240)   docusate sodium 100 mg Oral BID Melissa Ambron, DO    ferrous gluconate 324 mg Oral BID AC Melissa Ambron, DO    guaiFENesin 600 mg Oral BID Fredrick Hill, DPM    insulin glargine 22 Units Subcutaneous HS Melissa Lauraron, DO    insulin lispro 2-12 Units Subcutaneous TID AC Fredrick Hill, DPM    ipratropium-albuterol 3 mL Nebulization Q6H PRN Fredrick Hill, DPM    LORazepam 0 5 mg Oral Q8H PRN Melissa Lauraron, DO    melatonin 6 mg Oral HS Fredrick Hill, EDWINM    metoprolol tartrate 50 mg Oral Q12H [address] Fredrick Hill, DPM    oxyCODONE 5 mg Oral Q4H PRN Fredrick Hill, EDWINM    pravastatin 40 mg Oral Daily With Nilesh Moses Cha, DPM    tamsulosin 0 4 mg Oral Daily With Nilesh Moses Cha, DPM      Continuous Infusions:    PRN Meds:   acetaminophen    ipratropium-albuterol    LORazepam    oxyCODONE      Physical exam:  Physical Exam  General appearance: alert and oriented, in no acute distress  Head: Normocephalic, without obvious abnormality, atraumatic  Eyes: conjunctivae/corneas clear  PERRL, EOM's intact  Fundi benign    Neck: no adenopathy, no carotid bruit, no JVD, supple, symmetrical, trachea midline and thyroid not enlarged, symmetric, no tenderness/mass/nodules  Lungs: clear to auscultation bilaterally  Heart: regular rate and rhythm, S1, S2 normal, no murmur, click, rub or gallop  Abdomen: soft, non-tender; bowel sounds normal; no masses,  no organomegaly  Extremities: edema right lower ext trace edema left lower ext +2 pitting edema above bandage  Pulses: 2+ and symmetric  Skin: bandage left foot c/d/i  Neurologic: Mental status: Alert, oriented, thought content appropriate      VTE Pharmacologic Prophylaxis: eliquis  VTE Mechanical Prophylaxis: sequential compression device    Counseling / Coordination of Care  Total floor / unit time spent today 20 minutes      Current Length of Stay: 12 day(s)    Current Patient Status: Inpatient       Code Status: Level 1 - Full Code

## 2020-01-19 NOTE — CONSULTS
Consultation - Nephrology   Fco Cuna 75 y o  male MRN: [id number]  Unit/Bed#: E4 -01 Encounter: [id number]    ASSESSMENT:  1  Acute Kidney Injury, POA- secondary to prerenal azotemia initially with sepsis now likely progressed to ATN +/- obstructive nephropathy  - check UA  - check renal ultrasound  - PVR elevated, continue to monitor  - check urine eos  - avoid nephrotoxins, avoid IV contrast, avoid hypotension  - monitor intake/output/weights  2  Chronic Kidney Disease stage 2/3 with nephrotic range proteinuria- Baseline creatinine has progressed and is now in the low 1s  Follows with Dr Consuelo Torres  UPC ratio from July 2019 is 4 1  Serologic workup negative in the past   3  Volume Overload- cardiology on board  - lasix drip held today by primary team due to hyponatremia  - would consider changing to bumex drip after collecting urine specimens  - weight worsening from 97kg to 103kg  - could trial bumex drip with albumin if okay with cardiology  - consider venous duplex to rule out DVT  4  Hyponatremia- unclear etiology, appears to have started in June 2019  - workup pending: urine sodium, urine osm, serum osm, uric acid, tsh, am cortisol  - consider need to rule out malignancy  - not improving with diuresis so far, ? Effective diuresis  5  Urinary Retention- consider urology consult if no resolution  - PVR: 450ml 1/19/20, continue to check qshift and place ojeda catheter if needed  - on tamsulosin  6  Hypertension- history of left renal artery stenosis (60%)  - BP slightly elevated  - continue amlodipine and metoprolol  - avoid hypotension  7  Anemia, iron deficient- consider venofer once off abx  8   MSSA bacteremia and sepsis- secondary to left hallux wound  - on ancef per ID through 2/6  - ELBERT negative for vegetation  - s/p left hallux amputation on 1/8 and I&D with removal of sesmoid bone 1/12    PLAN:  Hyponatremia workup  Renal ultrasound  Urine eos  UA with micro  Monitor pvr  Monitor intake/output  Consider starting bumex drip once urine studies collected  Consider need for ojeda catheter    Discussed with Dr Chan Hernandez:  Requesting Physician: Tristan Murcia DO  Reason for Consult: ELZBIETA/Hyponatremia    Huber Patel is a 75y o  year old male who was admitted to [address] [place] [address] after presenting with weakness, fatigue, fever/chills 2 weeks ago  A renal consultation is requested today for assistance in the management of worsening hyponatremia and acute kidney injury  The patient is known to our group as he follows with Dr Levon Estrada  He was last seen in the office in July  He has no prior history of hyponatremia  His wife is present at bedside  He states he is feeling better from a fever standpoint but after the foot surgery, his breathing has worsened dramatically  He states he has mild LE edema at home which can be seen in his right leg but his left leg is significantly worse  He states his breathing is also much worse than normal and he usually has no shortness of breath  He is not eating much as he has no appetite  He feels he is unable to urinate and this started yesterday        PAST MEDICAL HISTORY:  Past Medical History:   Diagnosis Date    Arthritis     OA    Bruise of both arms     forearms and both hands    Bruises easily     CHF (congestive heart failure) ([address]     Diabetes mellitus ([address] )     Diabetic foot ulcer ([address] )     Eczema     Erectile dysfunction     Gout     Hyperlipidemia     Hypertension     Murmur     Nephropathy     Osteoarthritis     PAD (peripheral artery disease) (HCC)     Seasonal allergies     Toe infection     bilat great toes    Vertigo     Walks frequently     Wears dentures     upper    Wears glasses        PAST SURGICAL HISTORY:  Past Surgical History:   Procedure Laterality Date    CARDIAC PACEMAKER PLACEMENT      CARPAL TUNNEL RELEASE Left     CARPAL TUNNEL RELEASE Right     CARPAL TUNNEL RELEASE      INCISION AND DRAINAGE OF WOUND Left 1/12/2020    Procedure: INCISION AND DRAINAGE (I&D) EXTREMITY AND REMOVAL OF SESMOID BONE;  Surgeon: Marc Ames DPM;  [facility];  Service: Podiatry    IR PICC REPO  1/14/2020    KNEE ARTHROSCOPY Left     KNEE ARTHROSCOPY Right     KNEE SURGERY      WV AMPUTATION TOE,I-P JT Bilateral 5/2/2017    Procedure: PARTIAL AMPUTATION RIGHT AND LEFT HALLUX ;  Surgeon: Patrick Gonzales DPM;  [facility];  Service: Podiatry    W[place] AMPUTATION TOE,MT-P JT Left 7/25/2017    Procedure: 2ND TOE AMPUTATION;  Surgeon: Patrick Gonzales DPM;  [facility];  Service: Podiatry    SHOULDER ARTHROSCOPY Left     with screws,RTC    SHOULDER SURGERY      TOE AMPUTATION Left 1/8/2020    Procedure: Maximo Spine;  Surgeon: Marc Ames DPM;  [facility];  Service: Podiatry    VASECTOMY         ALLERGIES:  Allergies   Allergen Reactions    Invokana [Canagliflozin]     Lamisil [Terbinafine] Rash    Lamisil [Terbinafine] Blisters     Wife states " His skin peeled from head to toe"    Other Swelling     Pomegranate - facial swelling, no swelling of tongue, esophagus  Adhesive tape        Latex Rash       SOCIAL HISTORY:  Social History     Substance and Sexual Activity   Alcohol Use Never    Frequency: Never     Social History     Substance and Sexual Activity   Drug Use Never     Social History     Tobacco Use   Smoking Status Former Smoker    Packs/day: 1 00    Years: 30 00    Pack years: 30 00    Types: Cigarettes   Smokeless Tobacco Never Used   Tobacco Comment    quit 10 years ago       FAMILY HISTORY:  Family History   Problem Relation Age of Onset    Heart disease Father     No Known Problems Mother     Hypertension Father     Pulmonary embolism Father        MEDICATIONS:    Current Facility-Administered Medications:     acetaminophen (TYLENOL) tablet 650 mg, 650 mg, Oral, Q6H PRN, Melissa Sanchezron, DO, 650 mg at 01/18/20 1215   allopurinol (ZYLOPRIM) tablet 300 mg, 300 mg, Oral, QAM, Alex Holland, DPM, 300 mg at 01/19/20 0936    amLODIPine (NORVASC) tablet 10 mg, 10 mg, Oral, Daily, Alexisreal Holland, DPM, 10 mg at 01/19/20 0936    apixaban (ELIQUIS) tablet 5 mg, 5 mg, Oral, BID, Melissa Ambron, DO, 5 mg at 01/19/20 0936    ceFAZolin (ANCEF) IVPB (premix) 2,000 mg, 2,000 mg, Intravenous, Q8H, Alex Holland, DPM, Stopped at 01/19/20 0240    docusate sodium (COLACE) capsule 100 mg, 100 mg, Oral, BID, Melissa Ambron, DO, 100 mg at 01/19/20 0936    ferrous gluconate (FERGON) tablet 324 mg, 324 mg, Oral, BID AC, Melissa Ambron, DO, 324 mg at 01/19/20 0630    guaiFENesin (MUCINEX) 12 hr tablet 600 mg, 600 mg, Oral, BID, Alex Holland, DPM, 600 mg at 01/19/20 0936    insulin glargine (LANTUS) subcutaneous injection 22 Units 0 22 mL, 22 Units, Subcutaneous, HS, Melissa Ambron, DO, 22 Units at 01/18/20 2140    insulin lispro (HumaLOG) 100 units/mL subcutaneous injection 2-12 Units, 2-12 Units, Subcutaneous, TID AC, 2 Units at 01/19/20 0936 **AND** Fingerstick Glucose (POCT), , , TID AC, Alex Holland, DPM    ipratropium-albuterol (DUO-NEB) 0 5-2 5 mg/3 mL inhalation solution 3 mL, 3 mL, Nebulization, Q6H PRN, Alex Holland DPFELTON, 3 mL at 01/07/20 2025    LORazepam (ATIVAN) tablet 0 5 mg, 0 5 mg, Oral, Q8H PRN, Melissa Ambron, DO, 0 5 mg at 01/17/20 1402    melatonin tablet 6 mg, 6 mg, Oral, HS, Alex Holland, DPM, 6 mg at 01/18/20 2140    metoprolol tartrate (LOPRESSOR) tablet 50 mg, 50 mg, Oral, Q12H [address], Alex Holland DPM, 50 mg at 01/19/20 0936    oxyCODONE (ROXICODONE) IR tablet 5 mg, 5 mg, Oral, Q4H PRN, Alex Holland DPFELTON, 5 mg at 01/12/20 1709    pravastatin (PRAVACHOL) tablet 40 mg, 40 mg, Oral, Daily With Wendy Sharma DPFELTON, 40 mg at 01/18/20 1648    tamsulosin (FLOMAX) capsule 0 4 mg, 0 4 mg, Oral, Daily With Wendy Sharma DPM, 0 4 mg at 01/18/20 1648    REVIEW OF SYSTEMS:  A complete 10 point review of systems was performed and found to be negative unless otherwise noted in the history of present illness  General: No fevers, chills  Cardiovascular:  No chest pain, + leg edema  Respiratory: No cough, sputum production,  + shortness of breath  Gastrointestinal:  No nausea/vomiting,  No diarrhea,  + abdominal pain  Genitourinary: No hematuria  No foamy urine  No dysuria   + inability to urinate today      PHYSICAL EXAM:  Current Weight: Weight - Scale: 103 kg (226 lb 3 1 oz)  First Weight: Weight - Scale: 97 2 kg (214 lb 4 6 oz)  Vitals:    01/1955 01/18/20 2300 01/19/20 0600 01/19/20 0744   BP:  150/69  165/70   BP Location:  Left arm  Right arm   Pulse:  74  80   Resp:  20  20   Temp:  98 9 °F (37 2 °C)  98 9 °F (37 2 °C)   TempSrc:  Tympanic  Temporal   SpO2: 97% 94%  96%   Weight:   103 kg (226 lb 3 1 oz)        Intake/Output Summary (Last 24 hours) at 1/19/2020 1010  Last data filed at 1/19/2020 0750  Gross per 24 hour   Intake 167 9 ml   Output 1219 ml   Net -1051 1 ml     General: NAD  Skin: no rash  Eyes: anicteric  ENMT: mm moist  Neck: no masses  Respiratory: decreased with fine crackles  CVS: RRR, systolic murmur  Extremities: ++ left LE edema to hip, + right LE edema to knee  GI: soft nt nd  Neuro: alert awake  Psych: mood and affect appropriate     Lab Results:   Results from last 7 days   Lab Units 01/19/20  0439 01/18/20  1654 01/18/20  0629 01/17/20  0643 01/16/20  0453   WBC Thousand/uL 14 89*  --   --  14 88* 13 37*   HEMOGLOBIN g/dL 9 4*  --   --  8 0* 7 4*   HEMATOCRIT % 28 7*  --   --  24 9* 23 1*   PLATELETS Thousands/uL 406*  --   --  477* 458*   POTASSIUM mmol/L 3 7 3 8 3 6 3 5 3 8   CHLORIDE mmol/L 95* 97* 98* 100 101   CO2 mmol/L 23 24 22 23 22   BUN mg/dL 102* 96* 88* 87* 89*   CREATININE mg/dL 2 77* 2 78* 2 48* 2 17* 2 00*   CALCIUM mg/dL 8 3 8 6 8 8 8 5 8 5

## 2020-01-20 ENCOUNTER — APPOINTMENT (INPATIENT)
Dept: NON INVASIVE DIAGNOSTICS | Facility: HOSPITAL | Age: 65
DRG: 853 | End: 2020-01-20
Payer: MEDICARE

## 2020-01-20 ENCOUNTER — APPOINTMENT (INPATIENT)
Dept: RADIOLOGY | Facility: HOSPITAL | Age: 65
DRG: 853 | End: 2020-01-20
Payer: MEDICARE

## 2020-01-20 PROBLEM — D50.9 IRON DEFICIENCY ANEMIA: Status: ACTIVE | Noted: 2020-01-20

## 2020-01-20 PROBLEM — F41.9 ANXIETY: Status: ACTIVE | Noted: 2020-01-20

## 2020-01-20 PROBLEM — R65.20 SEVERE SEPSIS (HCC): Status: ACTIVE | Noted: 2018-11-03

## 2020-01-20 PROBLEM — E87.1 HYPONATREMIA: Status: ACTIVE | Noted: 2020-01-20

## 2020-01-20 PROBLEM — R78.81 BACTEREMIA DUE TO GRAM-POSITIVE BACTERIA: Status: RESOLVED | Noted: 2020-01-08 | Resolved: 2020-01-20

## 2020-01-20 PROBLEM — D72.829 LEUKOCYTOSIS: Status: RESOLVED | Noted: 2019-05-02 | Resolved: 2020-01-20

## 2020-01-20 LAB
ANION GAP SERPL CALCULATED.3IONS-SCNC: 12 MMOL/L (ref 4–13)
ANION GAP SERPL CALCULATED.3IONS-SCNC: 15 MMOL/L (ref 4–13)
ARTERIAL PATENCY WRIST A: YES
ARTERIAL PATENCY WRIST A: YES
BASE EXCESS BLDA CALC-SCNC: -1.8 MMOL/L
BASE EXCESS BLDA CALC-SCNC: -6.7 MMOL/L
BODY TEMPERATURE: 98.7 DEGREES FEHRENHEIT
BUN SERPL-MCNC: 110 MG/DL (ref 5–25)
BUN SERPL-MCNC: 114 MG/DL (ref 5–25)
CALCIUM SERPL-MCNC: 8 MG/DL (ref 8.3–10.1)
CALCIUM SERPL-MCNC: 8.3 MG/DL (ref 8.3–10.1)
CHLORIDE SERPL-SCNC: 95 MMOL/L (ref 100–108)
CHLORIDE SERPL-SCNC: 97 MMOL/L (ref 100–108)
CO2 SERPL-SCNC: 21 MMOL/L (ref 21–32)
CO2 SERPL-SCNC: 22 MMOL/L (ref 21–32)
CORTIS SERPL-MCNC: 20.2 UG/DL
CREAT SERPL-MCNC: 2.96 MG/DL (ref 0.6–1.3)
CREAT SERPL-MCNC: 2.98 MG/DL (ref 0.6–1.3)
EOSINOPHIL NFR URNS MANUAL: 1 %
GFR SERPL CREATININE-BSD FRML MDRD: 21 ML/MIN/1.73SQ M
GFR SERPL CREATININE-BSD FRML MDRD: 21 ML/MIN/1.73SQ M
GLUCOSE SERPL-MCNC: 150 MG/DL (ref 65–140)
GLUCOSE SERPL-MCNC: 153 MG/DL (ref 65–140)
GLUCOSE SERPL-MCNC: 161 MG/DL (ref 65–140)
GLUCOSE SERPL-MCNC: 186 MG/DL (ref 65–140)
GLUCOSE SERPL-MCNC: 196 MG/DL (ref 65–140)
GLUCOSE SERPL-MCNC: 363 MG/DL (ref 65–140)
HCO3 BLDA-SCNC: 15.8 MMOL/L (ref 22–28)
HCO3 BLDA-SCNC: 20.9 MMOL/L (ref 22–28)
IPAP: 17
NON VENT CPAP: ABNORMAL
NON VENT- BIPAP: ABNORMAL
O2 CT BLDA-SCNC: 10.8 ML/DL (ref 16–23)
O2 CT BLDA-SCNC: 12.2 ML/DL (ref 16–23)
OXYHGB MFR BLDA: 86.7 % (ref 94–97)
OXYHGB MFR BLDA: 95.8 % (ref 94–97)
PCO2 BLDA: 22.2 MM HG (ref 36–44)
PCO2 BLDA: 28.3 MM HG (ref 36–44)
PCO2 TEMP ADJ BLDA: 22.3 MM HG (ref 36–44)
PEEP MAX SETTING VENT: 11 CM[H2O]
PH BLD: 7.47 [PH] (ref 7.35–7.45)
PH BLDA: 7.47 [PH] (ref 7.35–7.45)
PH BLDA: 7.49 [PH] (ref 7.35–7.45)
PO2 BLD: 54.7 MM HG (ref 75–129)
PO2 BLDA: 54.3 MM HG (ref 75–129)
PO2 BLDA: 83.5 MM HG (ref 75–129)
POTASSIUM SERPL-SCNC: 3.6 MMOL/L (ref 3.5–5.3)
POTASSIUM SERPL-SCNC: 3.7 MMOL/L (ref 3.5–5.3)
SODIUM SERPL-SCNC: 131 MMOL/L (ref 136–145)
SODIUM SERPL-SCNC: 131 MMOL/L (ref 136–145)
SPECIMEN SOURCE: ABNORMAL
SPECIMEN SOURCE: ABNORMAL

## 2020-01-20 PROCEDURE — 82533 TOTAL CORTISOL: CPT | Performed by: PHYSICIAN ASSISTANT

## 2020-01-20 PROCEDURE — 82948 REAGENT STRIP/BLOOD GLUCOSE: CPT

## 2020-01-20 PROCEDURE — 80048 BASIC METABOLIC PNL TOTAL CA: CPT | Performed by: INTERNAL MEDICINE

## 2020-01-20 PROCEDURE — 71045 X-RAY EXAM CHEST 1 VIEW: CPT

## 2020-01-20 PROCEDURE — 99233 SBSQ HOSP IP/OBS HIGH 50: CPT | Performed by: INTERNAL MEDICINE

## 2020-01-20 PROCEDURE — 94660 CPAP INITIATION&MGMT: CPT

## 2020-01-20 PROCEDURE — 36600 WITHDRAWAL OF ARTERIAL BLOOD: CPT

## 2020-01-20 PROCEDURE — 93971 EXTREMITY STUDY: CPT

## 2020-01-20 PROCEDURE — 93971 EXTREMITY STUDY: CPT | Performed by: SURGERY

## 2020-01-20 PROCEDURE — 82805 BLOOD GASES W/O2 SATURATION: CPT | Performed by: INTERNAL MEDICINE

## 2020-01-20 PROCEDURE — 80048 BASIC METABOLIC PNL TOTAL CA: CPT

## 2020-01-20 PROCEDURE — 99232 SBSQ HOSP IP/OBS MODERATE 35: CPT | Performed by: INTERNAL MEDICINE

## 2020-01-20 PROCEDURE — 99291 CRITICAL CARE FIRST HOUR: CPT | Performed by: INTERNAL MEDICINE

## 2020-01-20 PROCEDURE — 94762 N-INVAS EAR/PLS OXIMTRY CONT: CPT

## 2020-01-20 RX ORDER — METOLAZONE 5 MG/1
5 TABLET ORAL ONCE
Status: COMPLETED | OUTPATIENT
Start: 2020-01-20 | End: 2020-01-20

## 2020-01-20 RX ORDER — CEFAZOLIN SODIUM 1 G/50ML
1000 SOLUTION INTRAVENOUS EVERY 8 HOURS
Status: DISCONTINUED | OUTPATIENT
Start: 2020-01-20 | End: 2020-01-27

## 2020-01-20 RX ORDER — POTASSIUM CHLORIDE 20 MEQ/1
20 TABLET, EXTENDED RELEASE ORAL 2 TIMES DAILY
Status: DISCONTINUED | OUTPATIENT
Start: 2020-01-20 | End: 2020-01-29 | Stop reason: HOSPADM

## 2020-01-20 RX ADMIN — MELATONIN TAB 3 MG 6 MG: 3 TAB at 21:35

## 2020-01-20 RX ADMIN — POTASSIUM CHLORIDE 20 MEQ: 1500 TABLET, EXTENDED RELEASE ORAL at 18:18

## 2020-01-20 RX ADMIN — ALLOPURINOL 300 MG: 100 TABLET ORAL at 09:53

## 2020-01-20 RX ADMIN — LORAZEPAM 0.5 MG: 1 TABLET ORAL at 20:07

## 2020-01-20 RX ADMIN — DOCUSATE SODIUM 100 MG: 100 CAPSULE, LIQUID FILLED ORAL at 18:18

## 2020-01-20 RX ADMIN — DOCUSATE SODIUM 100 MG: 100 CAPSULE, LIQUID FILLED ORAL at 09:53

## 2020-01-20 RX ADMIN — APIXABAN 5 MG: 5 TABLET, FILM COATED ORAL at 18:20

## 2020-01-20 RX ADMIN — METOLAZONE 5 MG: 5 TABLET ORAL at 10:32

## 2020-01-20 RX ADMIN — METOPROLOL TARTRATE 50 MG: 50 TABLET, FILM COATED ORAL at 21:36

## 2020-01-20 RX ADMIN — Medication 1 MG/HR: at 18:32

## 2020-01-20 RX ADMIN — LORAZEPAM 0.5 MG: 1 TABLET ORAL at 09:53

## 2020-01-20 RX ADMIN — INSULIN LISPRO 2 UNITS: 100 INJECTION, SOLUTION INTRAVENOUS; SUBCUTANEOUS at 09:49

## 2020-01-20 RX ADMIN — CEFAZOLIN SODIUM 2000 MG: 2 SOLUTION INTRAVENOUS at 01:30

## 2020-01-20 RX ADMIN — CEFAZOLIN SODIUM 1000 MG: 1 SOLUTION INTRAVENOUS at 09:44

## 2020-01-20 RX ADMIN — FERROUS GLUCONATE 324 MG: 324 TABLET ORAL at 18:18

## 2020-01-20 RX ADMIN — POTASSIUM CHLORIDE 20 MEQ: 1500 TABLET, EXTENDED RELEASE ORAL at 10:32

## 2020-01-20 RX ADMIN — PRAVASTATIN SODIUM 40 MG: 40 TABLET ORAL at 18:18

## 2020-01-20 RX ADMIN — FERROUS GLUCONATE 324 MG: 324 TABLET ORAL at 06:03

## 2020-01-20 RX ADMIN — METOPROLOL TARTRATE 50 MG: 50 TABLET, FILM COATED ORAL at 09:53

## 2020-01-20 RX ADMIN — INSULIN LISPRO 10 UNITS: 100 INJECTION, SOLUTION INTRAVENOUS; SUBCUTANEOUS at 12:10

## 2020-01-20 RX ADMIN — APIXABAN 5 MG: 5 TABLET, FILM COATED ORAL at 09:53

## 2020-01-20 RX ADMIN — CEFAZOLIN SODIUM 1000 MG: 1 SOLUTION INTRAVENOUS at 18:20

## 2020-01-20 RX ADMIN — AMLODIPINE BESYLATE 10 MG: 10 TABLET ORAL at 09:54

## 2020-01-20 RX ADMIN — TAMSULOSIN HYDROCHLORIDE 0.4 MG: 0.4 CAPSULE ORAL at 18:17

## 2020-01-20 RX ADMIN — INSULIN GLARGINE 22 UNITS: 100 INJECTION, SOLUTION SUBCUTANEOUS at 21:35

## 2020-01-20 RX ADMIN — GUAIFENESIN 600 MG: 600 TABLET ORAL at 21:35

## 2020-01-20 RX ADMIN — Medication 0.5 MG/HR: at 01:08

## 2020-01-20 RX ADMIN — GUAIFENESIN 600 MG: 600 TABLET ORAL at 09:53

## 2020-01-20 NOTE — SOCIAL WORK
Cm reviewed pt care coordination rounds  Pt is not medically cleared for d/c  Pt is on Bumex gtt need to be weaned off  Anticipating d/c in 72 hrs  cm will f/u for final medical clearance and dcp

## 2020-01-20 NOTE — ASSESSMENT & PLAN NOTE
Patient with MSSA Bacteremia from left hallux wound  He remains on Ancef, he has received a PICC line  He did have a ELBERT that was negative for vegetation, he is status post left hallux amputation on 01/08/20/2020 as well as incision and drainage removal of the sesamoid bone on 01/12/2020  This appears to be the nidus of infection  Appreciate Podiatry recommendations  He will continue Ancef through to 06/20/2020      During this time he will need weekly CBCs in creatinine while on intravenous antibiotic

## 2020-01-20 NOTE — PROGRESS NOTES
Progress Note - Podiatry  Satya Turpin 75 y o  male MRN: [id number]  Unit/Bed#: E4 -01 Encounter: [id number]    Assessment:  1  S/p I&D with removal of sesamoid on 01/12/20 and s/p left hallux amputation on 01/08/20 due to underlying clinical OM, abscess, and possible infectious tenosynovitis   2  Sepsis - POA  3  Bacteremia likely secondary to #1  4  DM type 2    Plan:  - Left hallux amputation site appears well coapted distally with dried sanguinous drainage  Mild area of drainage from most proximal end of incision  Recommend c/w elevation and medical management for edema control to aid in incision healing   - dressed with xeroform dsd  - NWB LLE; PT/OT consult pending  - Rest of care per primary    Subjective/Objective   Chief Complaint:   Chief Complaint   Patient presents with    Fatigue     Pt with multiple complaints: sore throat, diarrhea, fatigue, fevers, tylenol at 0930  -ill contacts  Subjective: 75 y o  y/o male was seen and evaluated at bedside  Feels very SOB today  Blood pressure 155/69, pulse 77, temperature 98 7 °F (37 1 °C), temperature source Tympanic, resp  rate 18, weight 102 kg (225 lb 5 oz), SpO2 95 %  ,Body mass index is 32 62 kg/m²  Invasive Devices     Peripherally Inserted Central Catheter Line            PICC Line 75/46/20 Right Basilic 6 days          Drain            Urethral Catheter Non-latex 18 Fr  less than 1 day                Physical Exam:   General: Alert, cooperative and no distress  Lungs: Non labored breathing  Heart: Positive S1, S2  Abdomen: Soft, non-tender  Extremity: gross msk and nvs baseline  Denies B/L calf pain  S/p left hallux amputation; site appears well coapted without dehiscence  Very proximal portion of incision with minimal serosanguinous drainage, no further SOI  Minimal periwound erythema                  Lab, Imaging and other studies:   CBC: No results found for: WBC, HGB, HCT, MCV, PLT, ADJUSTEDWBC, MCH, MCHC, RDW, MPV, NRBC, CMP: Lab Results   Component Value Date    SODIUM 131 (L) 01/20/2020    K 3 6 01/20/2020    CL 95 (L) 01/20/2020    CO2 21 01/20/2020     (H) 01/20/2020    CREATININE 2 96 (H) 01/20/2020    CALCIUM 8 3 01/20/2020    EGFR 21 01/20/2020

## 2020-01-20 NOTE — ASSESSMENT & PLAN NOTE
Suspected elzbieta on ckd  ELZBIETA may be due to illness and poor oral intake    Nephrology consultation  Placed - Clinically volume overloaded

## 2020-01-20 NOTE — ASSESSMENT & PLAN NOTE
Wt Readings from Last 3 Encounters:   01/20/20 102 kg (225 lb 5 oz)   12/13/19 96 2 kg (212 lb)   12/03/19 97 5 kg (215 lb)       Now with acute exacerbation - On Bumex drip    Repeat labs in the am    CXR with volume overload - Repeat labs in the am  ELBERT w/LVEF 60% but moderate to severe mitral stenosis    Appreciate cardiology recommendations

## 2020-01-20 NOTE — PROGRESS NOTES
Progress Note - Infectious Disease   Carl Jeter 75 y o  male MRN: [id number]  Unit/Bed#: E4 -01 Encounter: [id number]    Impression/Plan:  1  Sepsis   POA   Fever and leukocytosis   Likely secondary to MSSA bacteremia with left hallux wound and underlying osteopmyelitis as presumed source  No other clear source appreciated  Despite being systemically ill the patient has been hemodynamically stable  Repeat blood cultures were negative after 5 days  TTE and ELBERT were negative for valvlular vegetation and pacemaker lead abnormalities  -antibiotic as below  -monitor CBC and BMP  -monitor vitals  -supportive care     2  Ayesha Ede in both sets of patient's initial blood cultures  Suspect patient's left great toe ulcer was source   Patient does have an implanted pacemaker  TTE and ELBERT were negative  Repeat blood cultures were clear after 5 days  Patient is currently receiving IV cefazolin and is tolerating without difficulty  He will complete 4 weeks of IV antibiotic treatment, through 2/6/2020   -continue IV cefazolin, now dosed 1g q8 hours for renal function, through 2/6/2020 for 4 weeks of antibiotic treatment  -remove PICC after patient's final dose of IV antibiotics on 2/6/2020  -weekly CBCD and BMP while on IV antibiotics  -monitor vitals  -follow up in the outpatient ID office on 1/28/2020 at [address] [place] [address] with Veena ROSA  -antibiotic and lab scripts dropped off in patient chart on 1/15/2020      3  Left hallux ulceration   With osteomyelitis as his wound does probe to bone and an x-ray of the left foot was suggestive of bony erosion at the 1st proximal phalanx   Purulence noted during initial podiatry wound exam, concern for possible infectious tenosynovitis   I suspect this was the source of patient's bacteremia above  Juan A Reasoner is now status post amputation of hallux for presumed surgical cure of the ulcer and osteomyelitis  Intraop wound cultures showed MSSA  He had additional I&D and removal and sesamoid bone on 1/12/2020    -serial left foot exams  -follow up intraop wound cultures  -local wound care per Podiatry  -continue close follow-up with Podiatry    4  Acute kidney injury  On CKD  Per patient's medical records it appears his baseline creatinine has been approximately 1  Consider secondary to volume overload  Also consider urinary retention, a ojeda is now in place  He is following closely with nephrology  Antibiotic dosing was adjusted today for decreased CrCl   -check BMP tomorrow  -dose adjust antibiotics for renal function as needed  -continue close follow up with nephrology    5  Acute hypoxic respiratory failure   Suspect this is secondary to pulmonary edema and diastolic heart failure   BNP and troponin were elevated upon admission  Chest x-ray and CT of the chest did not show consolidations indicative of a bacterial process  Patient did initially require high-flow O2 but is now stable on nasal cannula O2  Patient with low hemoglobin which have contributed to his SOB  He received a transfusion of PRBC yesterday  Nephrology is now following and patient has been placed on a lasix drip   -diuresis per Cardiology  -wean O2 support as tolerated per primary service  -monitor vitals  -monitor respiratory status  -continue close follow-up with Cardiology  -continue close follow-up with nephrology     6  Type 2 diabetes mellitus with neuropathy   Patient's last hemoglobin A1c was 6 4% on 01/07/2020   This is risk factor for wounds and infection   Recommend tight glycemic control for overall health, especially in the setting of wound infection   -blood glucose management per primary service     7  Chronic diastolic heart failure   In setting of aortic and mitral valve stenosis and complete heart block   He has a [employer] dual chamber pacemaker in place   Now with Staph aureus bacteremia   TTE and ELBERT were negative  He is following closely with Cardiology   -continue follow-up with Cardiology     8  Paroxysmal AFib   On Eliquis  Above plan was discussed in detail with patient at the bedside  Antibiotics:  Cefazolin 11  Antibiotics 14    Subjective:  Patient reports he's starting to feel depressed  Reports he is scared about all the health problems he's dealing with but is also tired of being in the hospital and wants to go home  He denies fever, chills, sweats, shakes; no nausea, vomiting, abdominal pain, diarrhea, or dysuria; he has an occasional non-productive cough and mild shortness of breath on exertion; he has no chest pain  I helped him fix his denture cup  No concerns with his ojeda catheter  No new symptoms  Objective:  Vitals:  Temp:  [97 9 °F (36 6 °C)-99 3 °F (37 4 °C)] 98 7 °F (37 1 °C)  HR:  [] 77  Resp:  [18-20] 18  BP: (144-158)/(65-69) 155/69  SpO2:  [89 %-95 %] 95 %  Temp (24hrs), Av 7 °F (37 1 °C), Min:97 9 °F (36 6 °C), Max:99 3 °F (37 4 °C)  Current: Temperature: 98 7 °F (37 1 °C)    Physical Exam:   General Appearance:  Alert, interactive, nontoxic, no acute distress  Throat: Oropharynx moist without lesions  Lungs:   Clear to auscultation bilaterally; no wheezes, rhonchi or rales; respirations unlabored; patient on nasal cannula O2   Heart:  RRR; no murmur, rub or gallop   Genitourinary: Ojeda intact, urine output is calvillo yellow without sediment   Abdomen:   Soft, non-tender, non-distended, positive bowel sounds  Extremities: No clubbing or cyanosis; mild B/L LE edema; L foot dressing is intact, no spreading erythema from bandage site   Skin: No new rashes, lesions, or draining wounds noted on exposed skin       Labs, Imaging, & Other studies:   All pertinent labs and imaging studies were personally reviewed  Results from last 7 days   Lab Units 20  0439 20  0643 20  0453   WBC Thousand/uL 14 89* 14 88* 13 37*   HEMOGLOBIN g/dL 9 4* 8 0* 7 4*   PLATELETS Thousands/uL 406* 477* 458*     Results from last 7 days   Lab Units 01/20/20  0617   POTASSIUM mmol/L 3 6   CHLORIDE mmol/L 95*   CO2 mmol/L 21   BUN mg/dL 110*   CREATININE mg/dL 2 96*   EGFR ml/min/1 73sq m 21   CALCIUM mg/dL 8 3

## 2020-01-20 NOTE — OCCUPATIONAL THERAPY NOTE
Occupational Therapy         Patient Name: Carl Jeter  QTYAD'O Date: 1/20/2020        Occupational Therapy Cancellation Note    Chart review performed  At this time, pt refusing OT treatment session due to fatigue  Pt now on continuous BiPAP, and demonstrates unwillingness to participate despite much encouragement  Pt refused treatment session 2x today  OT will follow and provide OT interventions as appropriate      CESAR Lofton/SANDY

## 2020-01-20 NOTE — PLAN OF CARE
Problem: Potential for Falls  Goal: Patient will remain free of falls  Description  INTERVENTIONS:  - Assess patient frequently for physical needs  -  Identify cognitive and physical deficits and behaviors that affect risk of falls    -  Robinson fall precautions as indicated by assessment   - Educate patient/family on patient safety including physical limitations  - Instruct patient to call for assistance with activity based on assessment  - Modify environment to reduce risk of injury  - Consider OT/PT consult to assist with strengthening/mobility  Outcome: Progressing     Problem: PAIN - ADULT  Goal: Verbalizes/displays adequate comfort level or baseline comfort level  Description  Interventions:  - Encourage patient to monitor pain and request assistance  - Assess pain using appropriate pain scale  - Administer analgesics based on type and severity of pain and evaluate response  - Implement non-pharmacological measures as appropriate and evaluate response  - Consider cultural and social influences on pain and pain management  - Notify physician/advanced practitioner if interventions unsuccessful or patient reports new pain  Outcome: Progressing     Problem: INFECTION - ADULT  Goal: Absence or prevention of progression during hospitalization  Description  INTERVENTIONS:  - Assess and monitor for signs and symptoms of infection  - Monitor lab/diagnostic results  - Monitor all insertion sites, i e  indwelling lines, tubes, and drains  - Administer medications as ordered  - Instruct and encourage patient and family to use good hand hygiene technique  - Identify and instruct in appropriate isolation precautions for identified infection/condition   Outcome: Progressing     Problem: SAFETY ADULT  Goal: Maintain or return to baseline ADL function  Description  INTERVENTIONS:  -  Assess patient's ability to carry out ADLs; assess patient's baseline for ADL function and identify physical deficits which impact ability to perform ADLs (bathing, care of mouth/teeth, toileting, grooming, dressing, etc )  - Assess/evaluate cause of self-care deficits   - Assess range of motion  - Assess patient's mobility; develop plan if impaired  - Assess patient's need for assistive devices and provide as appropriate  - Encourage maximum independence but intervene and supervise when necessary  - Involve family in performance of ADLs  - Assess for home care needs following discharge   - Consider OT consult to assist with ADL evaluation and planning for discharge  - Provide patient education as appropriate  Outcome: Progressing  Goal: Maintain or return mobility status to optimal level  Description  INTERVENTIONS:  - Assess patient's baseline mobility status (ambulation, transfers, stairs, etc )    - Identify cognitive and physical deficits and behaviors that affect mobility  - Identify mobility aids required to assist with transfers and/or ambulation (gait belt, sit-to-stand, lift, walker, cane, etc )  - Willits fall precautions as indicated by assessment  - Record patient progress and toleration of activity level on Mobility SBAR; progress patient to next Phase/Stage  - Instruct patient to call for assistance with activity based on assessment  - Consider rehabilitation consult to assist with strengthening/weightbearing, etc   Outcome: Progressing     Problem: DISCHARGE PLANNING  Goal: Discharge to home or other facility with appropriate resources  Description  INTERVENTIONS:  - Identify barriers to discharge w/patient and caregiver  - Arrange for needed discharge resources and transportation as appropriate  - Identify discharge learning needs (meds, wound care, etc )  - Arrange for interpretive services to assist at discharge as needed  - Refer to Case Management Department for coordinating discharge planning if the patient needs post-hospital services based on physician/advanced practitioner order or complex needs related to functional status, cognitive ability, or social support system  Outcome: Progressing     Problem: Knowledge Deficit  Goal: Patient/family/caregiver demonstrates understanding of disease process, treatment plan, medications, and discharge instructions  Description  Complete learning assessment and assess knowledge base    Interventions:  - Provide teaching at level of understanding  - Provide teaching via preferred learning methods  Outcome: Progressing     Problem: CARDIOVASCULAR - ADULT  Goal: Maintains optimal cardiac output and hemodynamic stability  Description  INTERVENTIONS:  - Monitor I/O, vital signs and rhythm  - Monitor for S/S and trends of decreased cardiac output  - Administer and titrate ordered vasoactive medications to optimize hemodynamic stability  - Assess quality of pulses, skin color and temperature  - Assess for signs of decreased coronary artery perfusion  - Instruct patient to report change in severity of symptoms  Outcome: Progressing  Goal: Absence of cardiac dysrhythmias or at baseline rhythm  Description  INTERVENTIONS:  - Continuous cardiac monitoring, vital signs, obtain 12 lead EKG if ordered  - Administer antiarrhythmic and heart rate control medications as ordered  - Monitor electrolytes and administer replacement therapy as ordered  Outcome: Progressing     Problem: RESPIRATORY - ADULT  Goal: Achieves optimal ventilation and oxygenation  Description  INTERVENTIONS:  - Assess for changes in respiratory status  - Assess for changes in mentation and behavior  - Position to facilitate oxygenation and minimize respiratory effort  - Oxygen administered by appropriate delivery if ordered  - Initiate smoking cessation education as indicated  - Encourage broncho-pulmonary hygiene including cough, deep breathe, Incentive Spirometry  - Assess the need for suctioning and aspirate as needed  - Assess and instruct to report SOB or any respiratory difficulty  - Respiratory Therapy support as indicated  Outcome: Progressing     Problem: Prexisting or High Potential for Compromised Skin Integrity  Goal: Skin integrity is maintained or improved  Description  INTERVENTIONS:  - Identify patients at risk for skin breakdown  - Assess and monitor skin integrity  - Assess and monitor nutrition and hydration status  - Monitor labs   - Assess for incontinence   - Turn and reposition patient  - Assist with mobility/ambulation  - Relieve pressure over bony prominences  - Avoid friction and shearing  - Provide appropriate hygiene as needed including keeping skin clean and dry  - Evaluate need for skin moisturizer/barrier cream  - Collaborate with interdisciplinary team   - Patient/family teaching  - Consider wound care consult   Outcome: Progressing     Problem: Nutrition/Hydration-ADULT  Goal: Nutrient/Hydration intake appropriate for improving, restoring or maintaining nutritional needs  Description  Monitor and assess patient's nutrition/hydration status for malnutrition  Collaborate with interdisciplinary team and initiate plan and interventions as ordered  Monitor patient's weight and dietary intake as ordered or per policy  Utilize nutrition screening tool and intervene as necessary  Determine patient's food preferences and provide high-protein, high-caloric foods as appropriate       INTERVENTIONS:  - Monitor oral intake, urinary output, labs, and treatment plans  - Assess nutrition and hydration status and recommend course of action  - Evaluate amount of meals eaten  - Assist patient with eating if necessary   - Allow adequate time for meals  - Recommend/ encourage appropriate diets, oral nutritional supplements, and vitamin/mineral supplements  - Order, calculate, and assess calorie counts as needed  - Recommend, monitor, and adjust tube feedings and TPN/PPN based on assessed needs  - Assess need for intravenous fluids  - Provide specific nutrition/hydration education as appropriate  - Include patient/family/caregiver in decisions related to nutrition  Outcome: Progressing     Problem: METABOLIC, FLUID AND ELECTROLYTES - ADULT  Goal: Glucose maintained within target range  Description  INTERVENTIONS:  - Monitor Blood Glucose as ordered  - Assess for signs and symptoms of hyperglycemia and hypoglycemia  - Administer ordered medications to maintain glucose within target range  - Assess nutritional intake and initiate nutrition service referral as needed  Outcome: Progressing     Problem: SKIN/TISSUE INTEGRITY - ADULT  Goal: Skin integrity remains intact  Description  INTERVENTIONS  - Identify patients at risk for skin breakdown  - Assess and monitor skin integrity  - Assess and monitor nutrition and hydration status  - Monitor labs (i e  albumin)  - Assess for incontinence   - Turn and reposition patient  - Assist with mobility/ambulation  - Relieve pressure over bony prominences  - Avoid friction and shearing  - Provide appropriate hygiene as needed including keeping skin clean and dry  - Evaluate need for skin moisturizer/barrier cream  - Collaborate with interdisciplinary team (i e  Nutrition, Rehabilitation, etc )   - Patient/family teaching  Outcome: Progressing  Goal: Incision(s), wounds(s) or drain site(s) healing without S/S of infection  Description  INTERVENTIONS  - Assess and document risk factors for skin impairment   - Assess and document dressing, incision, wound bed, drain sites and surrounding tissue  - Consider nutrition services referral as needed  - Oral mucous membranes remain intact  - Provide patient/ family education  Outcome: Progressing  Goal: Oral mucous membranes remain intact  Description  INTERVENTIONS  - Assess oral mucosa and hygiene practices  - Implement preventative oral hygiene regimen  - Implement oral medicated treatments as ordered  - Initiate Nutrition services referral as needed  Outcome: Progressing

## 2020-01-20 NOTE — PROGRESS NOTES
Progress Note - Meme Kim 1955, 75 y o  male MRN: [id number]    Unit/Bed#: E4 -01 Encounter: [id number]    Primary Care Provider: Lanette Tony DO   Date and time admitted to hospital: 1/7/2020 11:29 AM        Anxiety  Assessment & Plan  Ativan PRN    Iron deficiency anemia  Assessment & Plan  S/P one unit of PRBC's - HB stable, repeat in the am    Type 2 diabetes mellitus with diabetic neuropathy, without long-term current use of insulin [facility])  Assessment & Plan  Lab Results   Component Value Date    HGBA1C 6 4 (H) 01/07/2020       Recent Labs     01/19/20  2132 01/20/20  0708 01/20/20  1128 01/20/20  1556   POCGLU 306* 161* 363* 186*       Blood Sugar Average: Last 72 hrs:  (P) 104 [id number]     SSI and basal bolus insulin  Chronic diastolic (congestive) heart failure (HCC)  Assessment & Plan  Wt Readings from Last 3 Encounters:   01/20/20 102 kg (225 lb 5 oz)   12/13/19 96 2 kg (212 lb)   12/03/19 97 5 kg (215 lb)       Now with acute exacerbation - On Bumex drip    Repeat labs in the am    CXR with volume overload - Repeat labs in the am  ELBERT w/LVEF 60% but moderate to severe mitral stenosis    Appreciate cardiology recommendations      Acute renal failure superimposed on stage 3 chronic kidney disease ([facility] [address] )  Assessment & Plan  Suspected elzbieta on ckd  ELZBIETA may be due to illness and poor oral intake    Nephrology consultation  Placed - Clinically volume overloaded  PAF (paroxysmal atrial fibrillation) ([facility])  Assessment & Plan  Ventricular Rate controlled and paced rhythm  Continue lopressor/eliquis    Severe sepsis [facility])  Assessment & Plan  Patient with MSSA Bacteremia from left hallux wound  He remains on Ancef, he has received a PICC line  He did have a ELBERT that was negative for vegetation, he is status post left hallux amputation on 01/08/20/2020 as well as incision and drainage removal of the sesamoid bone on 01/12/2020  This appears to be the nidus of infection  Appreciate Podiatry recommendations  He will continue Ancef through to 06/20/2020  During this time he will need weekly CBCs in creatinine while on intravenous antibiotic    Complete heart block (HCC)  Assessment & Plan  S/p ppm  Monitored on telemetry    Eczema  Assessment & Plan  Stable discrete plaques over back w/silver scaling  Pruritic non painful  Continue op dupixent twice weekly upon d/c  Op f/u with derm  * Acute respiratory failure with hypoxia (HCC)  Assessment & Plan  Acute hypoxemic respiratory failure in the setting of acute on chronic diastolic congestive heart failure    He remains now on BiPAP, diuresis has been initiated by the cardiology service     Total critical care time 60 minutes  Total critical care time documented does not include time spent on separately billed procedures or the services of  nurse practioners or physician assistants  I have personally seen and examined the patient  I have reviewed all diagnostic interpretations and treatment plans as written  I was present for the key portions of any procedures performed and the inclusive time noted in any critical care statement  Critical care time includes patient management by me, time spent at the patients bedside, time to review lab and imaging results, discussing patient care, documentation in the medical record, and time spent with the family or caregiver      Meets Critical care criteria based on  Organ System affected pulmonary and cardiac  Time Spent 60 minutes  Action taken is BiPAP as well as intravenous Bumex, without this the patient has a life threatening condition for which rapid deterioation is imminent and will lead to potential death if untreated        [facility] Internal Medicine Progress Note  Patient: Bhupendra Sherwood 75 y o  male   MRN: [id number]  PCP: Hilda Whitaker DO  Unit/Bed#: E4 -01 Encounter: [id number]  Date Of Visit: 01/20/20    Assessment:    Principal Problem:    Acute respiratory failure with hypoxia ([facility] [address] )  Active Problems:    Eczema    Complete heart block (HCC)    Severe sepsis (HCC)    PAF (paroxysmal atrial fibrillation) (HCC)    NICOLASA (obstructive sleep apnea)    Type 2 diabetes mellitus with chronic kidney disease, without long-term current use of insulin (HCC)    Essential hypertension    Acute renal failure superimposed on stage 3 chronic kidney disease (HCC)    Chronic diastolic (congestive) heart failure (HCC)    Elevated troponin I level    Type 2 diabetes mellitus with diabetic neuropathy, without long-term current use of insulin (HCC)    Pyogenic inflammation of bone (HCC)    Hyponatremia    Iron deficiency anemia    Anxiety      Plan:    · Continue BiPAP  · Recheck labs in morning  · Repeat chest x-ray in am  · Repeat ABG in the morning  · High risk situation given renal failure and significant hypoxia    Case discussed with Critical Care, he is BiPAP mask was adjusted  Will place on step-down level 2       VTE Pharmacologic Prophylaxis:   Pharmacologic: Apixaban (Eliquis)  Mechanical VTE Prophylaxis in Place: Yes    Patient Centered Rounds: I have performed bedside rounds with nursing staff today  Discussions with Specialists or Other Care Team Provider:  Case management and critical care    Education and Discussions with Family / Patient:  Patient    Time Spent for Care: 20 minutes  More than 50% of total time spent on counseling and coordination of care as described above  Current Length of Stay: 13 day(s)    Current Patient Status: Inpatient   Certification Statement: The patient will continue to require additional inpatient hospital stay due to Hypoxia as well as significant pulmonary edema    Discharge Plan / Estimated Discharge Date: To be determined    Code Status: Level 1 - Full Code      Subjective:   Patient seen and examined, with significant respiratory distress on morning rounds    Much improved after BiPAP administration    A complete and comprehensive 14 point organ system review has been performed and all other systems are negative other than stated above  Objective:     Vitals:   Temp (24hrs), Av 8 °F (37 1 °C), Min:98 3 °F (36 8 °C), Max:99 3 °F (37 4 °C)    Temp:  [98 3 °F (36 8 °C)-99 3 °F (37 4 °C)] 98 3 °F (36 8 °C)  HR:  [] 95  Resp:  [18-20] 19  BP: (144-158)/(65-72) 150/72  SpO2:  [89 %-95 %] 95 %  Body mass index is 32 62 kg/m²  Input and Output Summary (last 24 hours): Intake/Output Summary (Last 24 hours) at 2020 1807  Last data filed at 2020 1425  Gross per 24 hour   Intake 80 ml   Output 1075 ml   Net -995 ml       Physical Exam:     General: well appearing, no acute distress  HEENT: atraumatic, PERRLA, moist mucosa, normal pharynx, normal tonsils and adenoids, normal tongue, no fluid in sinuses  Neck: Trachea midline, no carotid bruit, no masses  Respiratory:  Diffuse rales  Cardiovascular:  Tachycardia  Abdomen: Soft, non-tender, non-distended, normal bowel sounds in all quadrants, no hepatosplenomegaly, no tympany  Rectal: deferred  Musculoskeletal: normal ROM in upper and lower extremities  Integumentary: warm, dry, and pink, with no rash, purpura, or petechia  Heme/Lymph: no lymphadenopathy, no bruises  Neurological: Cranial Nerves II-XII grossly intact  Psychiatric: cooperative with normal mood, affect, and cognition      Additional Data:     Labs:    Results from last 7 days   Lab Units 20  0439   WBC Thousand/uL 14 89*   HEMOGLOBIN g/dL 9 4*   HEMATOCRIT % 28 7*   PLATELETS Thousands/uL 406*     Results from last 7 days   Lab Units 20  0617   POTASSIUM mmol/L 3 6   CHLORIDE mmol/L 95*   CO2 mmol/L 21   BUN mg/dL 110*   CREATININE mg/dL 2 96*   CALCIUM mg/dL 8 3           * I Have Reviewed All Lab Data Listed Above  * Additional Pertinent Lab Tests Reviewed:  All [place] Admission Reviewed    Imaging:    Chest x-ray    Recent Cultures (last 7 days):           Last 24 Hours Medication List:     Current Facility-Administered Medications:  acetaminophen 650 mg Oral Q6H PRN Melissa Ambron, DO    allopurinol 300 mg Oral QAM Donnamarie Michelle, DPM    amLODIPine 10 mg Oral Daily Su Bernard PA-C    apixaban 5 mg Oral BID Melissa Ambron, DO    bumetanide 1 mg/hr Intravenous Continuous Rujul Hinds, DO Last Rate: 1 mg/hr (01/20/20 1033)   cefazolin 1,000 mg Intravenous Q8H PAT Rich Last Rate: 1,000 mg (01/20/20 0944)   docusate sodium 100 mg Oral BID Melissa Ambron, DO    ferrous gluconate 324 mg Oral BID AC Melissa Ambron, DO    guaiFENesin 600 mg Oral BID Donnamarie Michelle, DPM    insulin glargine 22 Units Subcutaneous HS Melissa Ambron, DO    insulin lispro 2-12 Units Subcutaneous TID AC Donreguloarinan Martinez, DPM    ipratropium-albuterol 3 mL Nebulization Q6H PRN Donnamarie Michelle, DPM    LORazepam 0 5 mg Oral Q8H PRN Melissa Ambron, DO    melatonin 6 mg Oral HS Donnamarie Michelle, DPM    metoprolol tartrate 50 mg Oral Q12H [address] Donnamarie Michelle, DPM    oxyCODONE 5 mg Oral Q4H PRN Donnamarie Michelle, DPM    potassium chloride 20 mEq Oral BID Rujul Hinds, DO    pravastatin 40 mg Oral Daily With Shane Perez DPM    tamsulosin 0 4 mg Oral Daily With Shane Perez DPM         Today, Patient Was Seen By: Gaurav Alvarado DO    ** Please Note: This note has been constructed using a voice recognition system   **

## 2020-01-20 NOTE — ASSESSMENT & PLAN NOTE
Acute hypoxemic respiratory failure in the setting of acute on chronic diastolic congestive heart failure    He remains now on BiPAP, diuresis has been initiated by the cardiology service     Total critical care time 60 minutes  Total critical care time documented does not include time spent on separately billed procedures or the services of  nurse practioners or physician assistants  I have personally seen and examined the patient  I have reviewed all diagnostic interpretations and treatment plans as written  I was present for the key portions of any procedures performed and the inclusive time noted in any critical care statement  Critical care time includes patient management by me, time spent at the patients bedside, time to review lab and imaging results, discussing patient care, documentation in the medical record, and time spent with the family or caregiver      Meets Critical care criteria based on  Organ System affected pulmonary and cardiac  Time Spent 60 minutes  Action taken is BiPAP as well as intravenous Bumex, without this the patient has a life threatening condition for which rapid deterioation is imminent and will lead to potential death if untreated

## 2020-01-20 NOTE — PROGRESS NOTES
Progress Note - Nephrology   Huber Patel 75 y o  male MRN: [id number]  Unit/Bed#: E4 -01 Encounter: [id number]      Assessment / Plan:  1  Acute kidney injury, POA, suspecting likely cardiorenal syndrome, could be component of obstructive uropathy given urinary retention +/- ATN  -ojeda in place  -Renal u/s shows echogenic kidneys  -UA with microscopy shows trace ketones, large blood, greater than 300 protein, 4-10 WBCs, 4-10 rbc's, moderate bacteria with 0-1 fine granular casts, 1% eosinophils noted  Granular casts are suggestive of ATN  -sCr plateaued in the high 2s, last sCr 2 96    No signs of uremia  No urgent RRT needs  -monitor renal indices on bumex gtt per cardiology   2  Hyponatremia, suspecting secondary to volume overload plus renal dysfunction - expect improvement with diuresis  Manuela low at 14  Urine osm 336, TSH, cortisol ok  Serum osm high at 311  Prior lipid panel ok  3  Chronic kidney disease 3, previous baseline creatinine in the low 1s with associated nephrotic range proteinuria, follows with Dr Levon Estrada  4  Volume overload - monitor daily weight, I/O on bumex gtt per cardiology  Might need to consider UF on HD if diuresis ineffective  5  MSSA bacteremia and sepsis d/t left hallux wound - on Ancef through 2/6 as per Infectious Disease  6  Urinary retention - with Ojeda catheter, consider urology consult  7  HTN - BP acceptable for level of ELZBIETA on CKD  Continue amlodipine 10 mg p o  Daily, metoprolol 50 mg p o  Q 12 hours with hold parameters  Also on Flomax  Status post 1 dose of metolazone 5 mg today  8  Anemia, iron deficiency- Hgb 9 4, on oral iron  Avoiding venofer in light of bacteremia  Monitor CBC  Hgb improving  Iron low at 33, ferritin 94, iron sat 11%, TIBC 314        Subjective:   He denies any chest pain but does have shortness of breath  He is more comfortable on BiPAP  Wife updated at bedside  Now on Bumex drip        Objective:     Vitals: Blood pressure 155/69, pulse 77, temperature 98 7 °F (37 1 °C), temperature source Tympanic, resp  rate 18, weight 102 kg (225 lb 5 oz), SpO2 90 %  ,Body mass index is 32 62 kg/m²  Temp (24hrs), Av 7 °F (37 1 °C), Min:97 9 °F (36 6 °C), Max:99 3 °F (37 4 °C)      Weight (last 2 days)     Date/Time   Weight    20 0600   102 (225 31)    20 0600   103 (226 19)    20 0550   101 (223 33)                Intake/Output Summary (Last 24 hours) at 2020 1357  Last data filed at 2020 0150  Gross per 24 hour   Intake 80 ml   Output 725 ml   Net -645 ml     I/O last 24 hours: In: 167 9 [I V :37 9; IV Piggyback:130]  Out: 725 [Urine:725]        Physical Exam:   Physical Exam   Constitutional: He appears well-developed and well-nourished  No distress  Obese, on bipap   HENT:   Head: Normocephalic and atraumatic  Mouth/Throat: No oropharyngeal exudate  Eyes: Right eye exhibits no discharge  Left eye exhibits no discharge  No scleral icterus  Neck: Neck supple  No thyromegaly present  Cardiovascular: Normal rate, regular rhythm and normal heart sounds  Pulmonary/Chest: Effort normal and breath sounds normal  He has no wheezes  He has no rales  Abdominal: Soft  Bowel sounds are normal  He exhibits no distension  There is no tenderness  Genitourinary:   Genitourinary Comments: +ojeda   Musculoskeletal: Normal range of motion  He exhibits edema (b/l LE)  Left leg wrapped   Neurological: He is alert  awake   Skin: Skin is warm and dry  No rash noted  He is not diaphoretic  Psychiatric: He has a normal mood and affect  His behavior is normal    Vitals reviewed        Invasive Devices     Peripherally Inserted Central Catheter Line            PICC Line 82/87/50 Right Basilic 7 days          Drain            Urethral Catheter Non-latex 18 Fr  1 day                Medications:    Scheduled Meds:  Current Facility-Administered Medications:  acetaminophen 650 mg Oral Q6H PRN Micaela Spivey DO allopurinol 300 mg Oral QAM Alfreida Terrance, DPM    amLODIPine 10 mg Oral Daily [place], [place]    apixaban 5 mg Oral BID Melissa Ambron, DO    bumetanide 1 mg/hr Intravenous Continuous Rujul Hinds, DO Last Rate: 1 mg/hr (01/20/20 1033)   cefazolin 1,000 mg Intravenous Q8H PAT Rich Last Rate: 1,000 mg (01/20/20 0944)   docusate sodium 100 mg Oral BID Melissa Ambron, DO    ferrous gluconate 324 mg Oral BID AC Melissa Ambron, DO    guaiFENesin 600 mg Oral BID Alfreida Terrance, DPM    insulin glargine 22 Units Subcutaneous HS Melissa Ambron, DO    insulin lispro 2-12 Units Subcutaneous TID AC Alfreida Terrance, DPM    ipratropium-albuterol 3 mL Nebulization Q6H PRN Alfreida Terrance, DPM    LORazepam 0 5 mg Oral Q8H PRN Melissa Ambron, DO    melatonin 6 mg Oral HS Alfreida Terrance, DPM    metoprolol tartrate 50 mg Oral Q12H [address] Alfreida Terrance, DPM    oxyCODONE 5 mg Oral Q4H PRN Alfreida Terrance, DPM    potassium chloride 20 mEq Oral BID Rujul Hinds, DO    pravastatin 40 mg Oral Daily With Rosales Calhoun Cha, DPM    tamsulosin 0 4 mg Oral Daily With Rosales Calhoun Cha, DPM        PRN Meds:   acetaminophen    ipratropium-albuterol    LORazepam    oxyCODONE    Continuous Infusions:  bumetanide 1 mg/hr Last Rate: 1 mg/hr (01/20/20 1033)           LAB RESULTS:      Results from last 7 days   Lab Units 01/20/20  0617 01/20/20  0100 01/19/20  1825 01/19/20  1211 01/19/20  0439 01/18/20  1654 01/18/20  0629 01/17/20  0643 01/16/20  0453 01/15/20  0526 01/14/20  0444   WBC Thousand/uL  --   --   --   --  14 89*  --   --  14 88* 13 37* 16 24* 15 69*   HEMOGLOBIN g/dL  --   --   --   --  9 4*  --   --  8 0* 7 4* 7 8* 7 4*   HEMATOCRIT %  --   --   --   --  28 7*  --   --  24 9* 23 1* 23 9* 22 4*   PLATELETS Thousands/uL  --   --   --   --  406*  --   --  477* 458* 481* 426*   POTASSIUM mmol/L 3 6 3 7 3 7 3 7 3 7 3 8 3 6 3 5 3 8 3 6 3 7   CHLORIDE mmol/L 95* 97* 96* 96* 95* 97* 98* 100 101 98* 97*   CO2 mmol/L 21 22 23 24 23 24 22 23 22 24 22 BUN mg/dL 110* 114* 110* 108* 102* 96* 88* 87* 89* 86* 86*   CREATININE mg/dL 2 96* 2 98* 2 91* 2 90* 2 77* 2 78* 2 48* 2 17* 2 00* 2 06* 2 15*   CALCIUM mg/dL 8 3 8 0* 8 1* 8 2* 8 3 8 6 8 8 8 5 8 5 8 3 7 9*       CUTURES:  Lab Results   Component Value Date    BLOODCX No Growth After 5 Days  01/09/2020    BLOODCX No Growth After 5 Days  01/09/2020    BLOODCX Staphylococcus aureus (A) 01/07/2020    BLOODCX Staphylococcus aureus (A) 01/07/2020    BLOODCX No Growth After 5 Days  05/15/2019    BLOODCX No Growth After 5 Days  05/15/2019    BLOODCX No Growth After 5 Days  05/02/2019    BLOODCX No Growth After 5 Days  05/02/2019                 Portions of the record may have been created with voice recognition software  Occasional wrong word or "sound a like" substitutions may have occurred due to the inherent limitations of voice recognition software  Read the chart carefully and recognize, using context, where substitutions have occurred  If you have any questions, please contact the dictating provider

## 2020-01-20 NOTE — PLAN OF CARE
Problem: Potential for Falls  Goal: Patient will remain free of falls  Description  INTERVENTIONS:  - Assess patient frequently for physical needs  -  Identify cognitive and physical deficits and behaviors that affect risk of falls    -  [employer] fall precautions as indicated by assessment   - Educate patient/family on patient safety including physical limitations  - Instruct patient to call for assistance with activity based on assessment  - Modify environment to reduce risk of injury  - Consider OT/PT consult to assist with strengthening/mobility  Outcome: Progressing     Problem: PAIN - ADULT  Goal: Verbalizes/displays adequate comfort level or baseline comfort level  Description  Interventions:  - Encourage patient to monitor pain and request assistance  - Assess pain using appropriate pain scale  - Administer analgesics based on type and severity of pain and evaluate response  - Implement non-pharmacological measures as appropriate and evaluate response  - Consider cultural and social influences on pain and pain management  - Notify physician/advanced practitioner if interventions unsuccessful or patient reports new pain  Outcome: Progressing     Problem: INFECTION - ADULT  Goal: Absence or prevention of progression during hospitalization  Description  INTERVENTIONS:  - Assess and monitor for signs and symptoms of infection  - Monitor lab/diagnostic results  - Monitor all insertion sites, i e  indwelling lines, tubes, and drains  - Administer medications as ordered  - Instruct and encourage patient and family to use good hand hygiene technique  - Identify and instruct in appropriate isolation precautions for identified infection/condition   Outcome: Progressing     Problem: SAFETY ADULT  Goal: Maintain or return to baseline ADL function  Description  INTERVENTIONS:  -  Assess patient's ability to carry out ADLs; assess patient's baseline for ADL function and identify physical deficits which impact ability to perform ADLs (bathing, care of mouth/teeth, toileting, grooming, dressing, etc )  - Assess/evaluate cause of self-care deficits   - Assess range of motion  - Assess patient's mobility; develop plan if impaired  - Assess patient's need for assistive devices and provide as appropriate  - Encourage maximum independence but intervene and supervise when necessary  - Involve family in performance of ADLs  - Assess for home care needs following discharge   - Consider OT consult to assist with ADL evaluation and planning for discharge  - Provide patient education as appropriate  Outcome: Progressing  Goal: Maintain or return mobility status to optimal level  Description  INTERVENTIONS:  - Assess patient's baseline mobility status (ambulation, transfers, stairs, etc )    - Identify cognitive and physical deficits and behaviors that affect mobility  - Identify mobility aids required to assist with transfers and/or ambulation (gait belt, sit-to-stand, lift, walker, cane, etc )  - [place] fall precautions as indicated by assessment  - Record patient progress and toleration of activity level on Mobility SBAR; progress patient to next Phase/Stage  - Instruct patient to call for assistance with activity based on assessment  - Consider rehabilitation consult to assist with strengthening/weightbearing, etc   Outcome: Progressing     Problem: DISCHARGE PLANNING  Goal: Discharge to home or other facility with appropriate resources  Description  INTERVENTIONS:  - Identify barriers to discharge w/patient and caregiver  - Arrange for needed discharge resources and transportation as appropriate  - Identify discharge learning needs (meds, wound care, etc )  - Arrange for interpretive services to assist at discharge as needed  - Refer to Case Management Department for coordinating discharge planning if the patient needs post-hospital services based on physician/advanced practitioner order or complex needs related to functional status, cognitive ability, or social support system  Outcome: Progressing     Problem: Knowledge Deficit  Goal: Patient/family/caregiver demonstrates understanding of disease process, treatment plan, medications, and discharge instructions  Description  Complete learning assessment and assess knowledge base    Interventions:  - Provide teaching at level of understanding  - Provide teaching via preferred learning methods  Outcome: Progressing     Problem: CARDIOVASCULAR - ADULT  Goal: Maintains optimal cardiac output and hemodynamic stability  Description  INTERVENTIONS:  - Monitor I/O, vital signs and rhythm  - Monitor for S/S and trends of decreased cardiac output  - Administer and titrate ordered vasoactive medications to optimize hemodynamic stability  - Assess quality of pulses, skin color and temperature  - Assess for signs of decreased coronary artery perfusion  - Instruct patient to report change in severity of symptoms  Outcome: Progressing  Goal: Absence of cardiac dysrhythmias or at baseline rhythm  Description  INTERVENTIONS:  - Continuous cardiac monitoring, vital signs, obtain 12 lead EKG if ordered  - Administer antiarrhythmic and heart rate control medications as ordered  - Monitor electrolytes and administer replacement therapy as ordered  Outcome: Progressing     Problem: RESPIRATORY - ADULT  Goal: Achieves optimal ventilation and oxygenation  Description  INTERVENTIONS:  - Assess for changes in respiratory status  - Assess for changes in mentation and behavior  - Position to facilitate oxygenation and minimize respiratory effort  - Oxygen administered by appropriate delivery if ordered  - Initiate smoking cessation education as indicated  - Encourage broncho-pulmonary hygiene including cough, deep breathe, Incentive Spirometry  - Assess the need for suctioning and aspirate as needed  - Assess and instruct to report SOB or any respiratory difficulty  - Respiratory Therapy support as indicated  Outcome: Progressing     Problem: Prexisting or High Potential for Compromised Skin Integrity  Goal: Skin integrity is maintained or improved  Description  INTERVENTIONS:  - Identify patients at risk for skin breakdown  - Assess and monitor skin integrity  - Assess and monitor nutrition and hydration status  - Monitor labs   - Assess for incontinence   - Turn and reposition patient  - Assist with mobility/ambulation  - Relieve pressure over bony prominences  - Avoid friction and shearing  - Provide appropriate hygiene as needed including keeping skin clean and dry  - Evaluate need for skin moisturizer/barrier cream  - Collaborate with interdisciplinary team   - Patient/family teaching  - Consider wound care consult   Outcome: Progressing     Problem: Nutrition/Hydration-ADULT  Goal: Nutrient/Hydration intake appropriate for improving, restoring or maintaining nutritional needs  Description  Monitor and assess patient's nutrition/hydration status for malnutrition  Collaborate with interdisciplinary team and initiate plan and interventions as ordered  Monitor patient's weight and dietary intake as ordered or per policy  Utilize nutrition screening tool and intervene as necessary  Determine patient's food preferences and provide high-protein, high-caloric foods as appropriate       INTERVENTIONS:  - Monitor oral intake, urinary output, labs, and treatment plans  - Assess nutrition and hydration status and recommend course of action  - Evaluate amount of meals eaten  - Assist patient with eating if necessary   - Allow adequate time for meals  - Recommend/ encourage appropriate diets, oral nutritional supplements, and vitamin/mineral supplements  - Order, calculate, and assess calorie counts as needed  - Recommend, monitor, and adjust tube feedings and TPN/PPN based on assessed needs  - Assess need for intravenous fluids  - Provide specific nutrition/hydration education as appropriate  - Include patient/family/caregiver in decisions related to nutrition  Outcome: Progressing     Problem: METABOLIC, FLUID AND ELECTROLYTES - ADULT  Goal: Glucose maintained within target range  Description  INTERVENTIONS:  - Monitor Blood Glucose as ordered  - Assess for signs and symptoms of hyperglycemia and hypoglycemia  - Administer ordered medications to maintain glucose within target range  - Assess nutritional intake and initiate nutrition service referral as needed  Outcome: Progressing     Problem: SKIN/TISSUE INTEGRITY - ADULT  Goal: Skin integrity remains intact  Description  INTERVENTIONS  - Identify patients at risk for skin breakdown  - Assess and monitor skin integrity  - Assess and monitor nutrition and hydration status  - Monitor labs (i e  albumin)  - Assess for incontinence   - Turn and reposition patient  - Assist with mobility/ambulation  - Relieve pressure over bony prominences  - Avoid friction and shearing  - Provide appropriate hygiene as needed including keeping skin clean and dry  - Evaluate need for skin moisturizer/barrier cream  - Collaborate with interdisciplinary team (i e  Nutrition, Rehabilitation, etc )   - Patient/family teaching  Outcome: Progressing  Goal: Incision(s), wounds(s) or drain site(s) healing without S/S of infection  Description  INTERVENTIONS  - Assess and document risk factors for skin impairment   - Assess and document dressing, incision, wound bed, drain sites and surrounding tissue  - Consider nutrition services referral as needed  - Oral mucous membranes remain intact  - Provide patient/ family education  Outcome: Progressing  Goal: Oral mucous membranes remain intact  Description  INTERVENTIONS  - Assess oral mucosa and hygiene practices  - Implement preventative oral hygiene regimen  - Implement oral medicated treatments as ordered  - Initiate Nutrition services referral as needed  Outcome: Progressing

## 2020-01-20 NOTE — PROGRESS NOTES
Progress Note - Cardiology   Todd Del Castillo 75 y o  male MRN: [id number]  Unit/Bed#: E4 -01 Encounter: [id number]      Assessment/Recommendations/Discussion:   1  Acute hypoxic respiratory failure  2  Acute on chronic diastolic CHF  3  Sepsis syndrome/MSSA bacteremia  4  Moderate AS, MS  5  CHB, PPM in place  6  HTN  7  Dyslipidemia  8  ELZBIETA on CKD  9  Non MI related troponin elevation secondary to sepsis/CHF/ELZBIETA/volume overload  10  DM  11  PAF, on Eliquis AC      · Pt more SOB today, with only modest response to current Bumex infusion  Will increase rate to  1 mg/hour and add metolazone  Appreciate nephrology support   · Start KDUR 20 po BID to prevent hypokalemia and adjust accordingly  · Continue amlodipine for BP control  · Continue Eliquis for CVA protection  · Abx per ID and primary team        Subjective: Pt seen/examined    Sitting upright, c/o more SOB today, +orthopnea          Physical Exam:  GEN:  NAD  HEENT:  MMM, NCAT, pink conjunctiva, EOMI, nonicteric sclera  CV:  NO JVD/HJR, RR, NO M/R/G, +S1/S2, NO PARASTERNAL HEAVE/THRILL, ++LE EDEMA upto thighs/sacrum, NO HEPATIC SYSTOLIC PULSATION, WARM EXTREMITIES  RESP:  Bibasilar crackles, decreased BS  ABD:  SOFT, NT, NO GROSS ORGANOMEGALY        Vitals:   /69 (BP Location: Left arm)   Pulse 77   Temp 98 7 °F (37 1 °C) (Tympanic)   Resp 18   Wt 102 kg (225 lb 5 oz)   SpO2 95%   BMI 32 62 kg/m²   Vitals:    01/19/20 0600 01/20/20 0600   Weight: 103 kg (226 lb 3 1 oz) 102 kg (225 lb 5 oz)       Intake/Output Summary (Last 24 hours) at 1/20/2020 1022  Last data filed at 1/20/2020 0150  Gross per 24 hour   Intake 130 ml   Output 725 ml   Net -595 ml       TELEMETRY: off  Lab Results:  Results from last 7 days   Lab Units 01/19/20  0439   WBC Thousand/uL 14 89*   HEMOGLOBIN g/dL 9 4*   HEMATOCRIT % 28 7*   PLATELETS Thousands/uL 406*     Results from last 7 days   Lab Units 01/20/20  0617   POTASSIUM mmol/L 3 6   CHLORIDE mmol/L 95*   CO2 mmol/L 21   BUN mg/dL 110*   CREATININE mg/dL 2 96*   CALCIUM mg/dL 8 3     Results from last 7 days   Lab Units 01/20/20  0617   POTASSIUM mmol/L 3 6   CHLORIDE mmol/L 95*   CO2 mmol/L 21   BUN mg/dL 110*   CREATININE mg/dL 2 96*   CALCIUM mg/dL 8 3           Medications:    Current Facility-Administered Medications:     acetaminophen (TYLENOL) tablet 650 mg, 650 mg, Oral, Q6H PRN, Melissa Ambron, DO, 650 mg at 01/18/20 1215    allopurinol (ZYLOPRIM) tablet 300 mg, 300 mg, Oral, QAM, Oris Oh, DPM, 300 mg at 01/20/20 0953    amLODIPine (NORVASC) tablet 10 mg, 10 mg, Oral, Daily, [facility], 10 mg at 01/20/20 1791    apixaban (ELIQUIS) tablet 5 mg, 5 mg, Oral, BID, Melissa Ambron, DO, 5 mg at 01/20/20 0953    bumetanide (BUMEX) 12 5 mg infusion 50 mL, 1 mg/hr, Intravenous, Continuous, Bean Hinds DO, Last Rate: 2 mL/hr at 01/20/20 0108, 0 5 mg/hr at 01/20/20 0108    ceFAZolin (ANCEF) IVPB (premix) 1,000 mg, 1,000 mg, Intravenous, Q8H, PAT Rich, Last Rate: 100 mL/hr at 01/20/20 0944, 1,000 mg at 01/20/20 0944    docusate sodium (COLACE) capsule 100 mg, 100 mg, Oral, BID, Melissa Ambron, DO, 100 mg at 01/20/20 0953    ferrous gluconate (FERGON) tablet 324 mg, 324 mg, Oral, BID AC, Melissa Ambron, DO, 324 mg at 01/20/20 0603    guaiFENesin (MUCINEX) 12 hr tablet 600 mg, 600 mg, Oral, BID, Oris Oh, DPM, 600 mg at 01/20/20 0953    insulin glargine (LANTUS) subcutaneous injection 22 Units 0 22 mL, 22 Units, Subcutaneous, HS, Melissa Ambron, DO, 22 Units at 01/19/20 2149    insulin lispro (HumaLOG) 100 units/mL subcutaneous injection 2-12 Units, 2-12 Units, Subcutaneous, TID AC, 2 Units at 01/20/20 0949 **AND** Fingerstick Glucose (POCT), , , TID AC, Oris Oh, DPM    ipratropium-albuterol (DUO-NEB) 0 5-2 5 mg/3 mL inhalation solution 3 mL, 3 mL, Nebulization, Q6H PRN, Oris Oh, DPM, 3 mL at 01/19/20 1243    LORazepam (ATIVAN) tablet 0 5 mg, 0 5 mg, Oral, Q8H PRN, Melissa Alonso DO, 0 5 mg at 01/20/20 0953    melatonin tablet 6 mg, 6 mg, Oral, HS, Delorise Dial, DPM, 6 mg at 01/19/20 2143    metolazone (ZAROXOLYN) tablet 5 mg, 5 mg, Oral, Once, Rujul Hinds, DO    metoprolol tartrate (LOPRESSOR) tablet 50 mg, 50 mg, Oral, Q12H [facility], Delorise Dial, DPM, 50 mg at 01/20/20 0953    oxyCODONE (ROXICODONE) IR tablet 5 mg, 5 mg, Oral, Q4H PRN, Delorise Dial, DPM, 5 mg at 01/12/20 1709    potassium chloride (K-DUR,KLOR-CON) CR tablet 20 mEq, 20 mEq, Oral, BID, Rujul Hinds, DO    pravastatin (PRAVACHOL) tablet 40 mg, 40 mg, Oral, Daily With Roselinda Jeannie, DPM, 40 mg at 01/19/20 1704    tamsulosin (FLOMAX) capsule 0 4 mg, 0 4 mg, Oral, Daily With Roselinda Jeannie, DPM, 0 4 mg at 01/19/20 1704    This note was completed in part utilizing M-Modal Fluency Direct Software  Grammatical errors, random word insertions, spelling mistakes, and incomplete sentences may be an occasional consequence of this system secondary to software limitations, ambient noise, and hardware issues  If you have any questions or concerns about the content, text, or information contained within the body of this dictation, please contact the provider for clarification

## 2020-01-20 NOTE — ASSESSMENT & PLAN NOTE
Lab Results   Component Value Date    HGBA1C 6 4 (H) 01/07/2020       Recent Labs     01/19/20  2132 01/20/20  0708 01/20/20  1128 01/20/20  1556   POCGLU 306* 161* 363* 186*       Blood Sugar Average: Last 72 hrs:  (P) 351 [id number]     SSI and basal bolus insulin

## 2020-01-21 ENCOUNTER — APPOINTMENT (INPATIENT)
Dept: RADIOLOGY | Facility: HOSPITAL | Age: 65
DRG: 853 | End: 2020-01-21
Payer: MEDICARE

## 2020-01-21 PROBLEM — E87.1 HYPONATREMIA: Status: RESOLVED | Noted: 2020-01-20 | Resolved: 2020-01-21

## 2020-01-21 PROBLEM — E87.6 HYPOKALEMIA: Status: ACTIVE | Noted: 2020-01-21

## 2020-01-21 LAB
ANION GAP SERPL CALCULATED.3IONS-SCNC: 13 MMOL/L (ref 4–13)
ARTERIAL PATENCY WRIST A: YES
BASE EXCESS BLDA CALC-SCNC: -0.6 MMOL/L
BASOPHILS # BLD AUTO: 0.09 THOUSANDS/ΜL (ref 0–0.1)
BASOPHILS NFR BLD AUTO: 1 % (ref 0–1)
BUN SERPL-MCNC: 118 MG/DL (ref 5–25)
CALCIUM SERPL-MCNC: 8.3 MG/DL (ref 8.3–10.1)
CHLORIDE SERPL-SCNC: 98 MMOL/L (ref 100–108)
CO2 SERPL-SCNC: 22 MMOL/L (ref 21–32)
CREAT SERPL-MCNC: 2.87 MG/DL (ref 0.6–1.3)
EOSINOPHIL # BLD AUTO: 0.66 THOUSAND/ΜL (ref 0–0.61)
EOSINOPHIL NFR BLD AUTO: 5 % (ref 0–6)
ERYTHROCYTE [DISTWIDTH] IN BLOOD BY AUTOMATED COUNT: 14.3 % (ref 11.6–15.1)
GFR SERPL CREATININE-BSD FRML MDRD: 22 ML/MIN/1.73SQ M
GLUCOSE SERPL-MCNC: 102 MG/DL (ref 65–140)
GLUCOSE SERPL-MCNC: 111 MG/DL (ref 65–140)
GLUCOSE SERPL-MCNC: 116 MG/DL (ref 65–140)
GLUCOSE SERPL-MCNC: 239 MG/DL (ref 65–140)
GLUCOSE SERPL-MCNC: 246 MG/DL (ref 65–140)
HCO3 BLDA-SCNC: 21.7 MMOL/L (ref 22–28)
HCT VFR BLD AUTO: 21.5 % (ref 36.5–49.3)
HGB BLD-MCNC: 7.1 G/DL (ref 12–17)
IMM GRANULOCYTES # BLD AUTO: 0.05 THOUSAND/UL (ref 0–0.2)
IMM GRANULOCYTES NFR BLD AUTO: 0 % (ref 0–2)
IPAP: 17
LYMPHOCYTES # BLD AUTO: 1.17 THOUSANDS/ΜL (ref 0.6–4.47)
LYMPHOCYTES NFR BLD AUTO: 10 % (ref 14–44)
MCH RBC QN AUTO: 30.2 PG (ref 26.8–34.3)
MCHC RBC AUTO-ENTMCNC: 33 G/DL (ref 31.4–37.4)
MCV RBC AUTO: 92 FL (ref 82–98)
MONOCYTES # BLD AUTO: 1.37 THOUSAND/ΜL (ref 0.17–1.22)
MONOCYTES NFR BLD AUTO: 11 % (ref 4–12)
NEUTROPHILS # BLD AUTO: 8.86 THOUSANDS/ΜL (ref 1.85–7.62)
NEUTS SEG NFR BLD AUTO: 73 % (ref 43–75)
NON VENT- BIPAP: ABNORMAL
NRBC BLD AUTO-RTO: 0 /100 WBCS
O2 CT BLDA-SCNC: 17.6 ML/DL (ref 16–23)
OXYHGB MFR BLDA: 91.9 % (ref 94–97)
PCO2 BLDA: 29.3 MM HG (ref 36–44)
PEEP MAX SETTING VENT: 11 CM[H2O]
PH BLDA: 7.49 [PH] (ref 7.35–7.45)
PLATELET # BLD AUTO: 394 THOUSANDS/UL (ref 149–390)
PMV BLD AUTO: 8.8 FL (ref 8.9–12.7)
PO2 BLDA: 63.3 MM HG (ref 75–129)
POTASSIUM SERPL-SCNC: 3.4 MMOL/L (ref 3.5–5.3)
RBC # BLD AUTO: 2.35 MILLION/UL (ref 3.88–5.62)
SODIUM SERPL-SCNC: 133 MMOL/L (ref 136–145)
SPECIMEN SOURCE: ABNORMAL
VENT BIPAP FIO2: ABNORMAL %
WBC # BLD AUTO: 12.2 THOUSAND/UL (ref 4.31–10.16)

## 2020-01-21 PROCEDURE — 99233 SBSQ HOSP IP/OBS HIGH 50: CPT | Performed by: INTERNAL MEDICINE

## 2020-01-21 PROCEDURE — 36600 WITHDRAWAL OF ARTERIAL BLOOD: CPT

## 2020-01-21 PROCEDURE — 80048 BASIC METABOLIC PNL TOTAL CA: CPT | Performed by: NURSE PRACTITIONER

## 2020-01-21 PROCEDURE — 94762 N-INVAS EAR/PLS OXIMTRY CONT: CPT

## 2020-01-21 PROCEDURE — 82805 BLOOD GASES W/O2 SATURATION: CPT | Performed by: INTERNAL MEDICINE

## 2020-01-21 PROCEDURE — 97530 THERAPEUTIC ACTIVITIES: CPT

## 2020-01-21 PROCEDURE — 71045 X-RAY EXAM CHEST 1 VIEW: CPT

## 2020-01-21 PROCEDURE — 85025 COMPLETE CBC W/AUTO DIFF WBC: CPT | Performed by: INTERNAL MEDICINE

## 2020-01-21 PROCEDURE — 99232 SBSQ HOSP IP/OBS MODERATE 35: CPT | Performed by: INTERNAL MEDICINE

## 2020-01-21 PROCEDURE — 82948 REAGENT STRIP/BLOOD GLUCOSE: CPT

## 2020-01-21 PROCEDURE — 99232 SBSQ HOSP IP/OBS MODERATE 35: CPT

## 2020-01-21 PROCEDURE — 97116 GAIT TRAINING THERAPY: CPT

## 2020-01-21 PROCEDURE — 94660 CPAP INITIATION&MGMT: CPT

## 2020-01-21 RX ADMIN — APIXABAN 5 MG: 5 TABLET, FILM COATED ORAL at 17:06

## 2020-01-21 RX ADMIN — ALLOPURINOL 300 MG: 100 TABLET ORAL at 08:40

## 2020-01-21 RX ADMIN — METOPROLOL TARTRATE 50 MG: 50 TABLET, FILM COATED ORAL at 21:16

## 2020-01-21 RX ADMIN — Medication 1 MG/HR: at 15:11

## 2020-01-21 RX ADMIN — AMLODIPINE BESYLATE 10 MG: 10 TABLET ORAL at 08:41

## 2020-01-21 RX ADMIN — POTASSIUM CHLORIDE 20 MEQ: 1500 TABLET, EXTENDED RELEASE ORAL at 08:41

## 2020-01-21 RX ADMIN — INSULIN LISPRO 4 UNITS: 100 INJECTION, SOLUTION INTRAVENOUS; SUBCUTANEOUS at 17:06

## 2020-01-21 RX ADMIN — DOCUSATE SODIUM 100 MG: 100 CAPSULE, LIQUID FILLED ORAL at 17:06

## 2020-01-21 RX ADMIN — METOPROLOL TARTRATE 50 MG: 50 TABLET, FILM COATED ORAL at 08:41

## 2020-01-21 RX ADMIN — CEFAZOLIN SODIUM 1000 MG: 1 SOLUTION INTRAVENOUS at 03:26

## 2020-01-21 RX ADMIN — MELATONIN TAB 3 MG 6 MG: 3 TAB at 21:16

## 2020-01-21 RX ADMIN — Medication 1 MG/HR: at 03:26

## 2020-01-21 RX ADMIN — PRAVASTATIN SODIUM 40 MG: 40 TABLET ORAL at 17:06

## 2020-01-21 RX ADMIN — GUAIFENESIN 600 MG: 600 TABLET ORAL at 08:41

## 2020-01-21 RX ADMIN — LORAZEPAM 0.5 MG: 1 TABLET ORAL at 21:21

## 2020-01-21 RX ADMIN — DOCUSATE SODIUM 100 MG: 100 CAPSULE, LIQUID FILLED ORAL at 08:41

## 2020-01-21 RX ADMIN — INSULIN GLARGINE 22 UNITS: 100 INJECTION, SOLUTION SUBCUTANEOUS at 21:16

## 2020-01-21 RX ADMIN — POTASSIUM CHLORIDE 20 MEQ: 1500 TABLET, EXTENDED RELEASE ORAL at 17:06

## 2020-01-21 RX ADMIN — CEFAZOLIN SODIUM 1000 MG: 1 SOLUTION INTRAVENOUS at 12:14

## 2020-01-21 RX ADMIN — CEFAZOLIN SODIUM 1000 MG: 1 SOLUTION INTRAVENOUS at 17:05

## 2020-01-21 RX ADMIN — GUAIFENESIN 600 MG: 600 TABLET ORAL at 21:16

## 2020-01-21 RX ADMIN — APIXABAN 5 MG: 5 TABLET, FILM COATED ORAL at 08:41

## 2020-01-21 RX ADMIN — FERROUS GLUCONATE 324 MG: 324 TABLET ORAL at 17:06

## 2020-01-21 RX ADMIN — TAMSULOSIN HYDROCHLORIDE 0.4 MG: 0.4 CAPSULE ORAL at 17:06

## 2020-01-21 RX ADMIN — LORAZEPAM 0.5 MG: 1 TABLET ORAL at 07:48

## 2020-01-21 NOTE — OCCUPATIONAL THERAPY NOTE
[address] Progress Note     Patient Name: Shaina Toribio  [id number] Date: 1/21/2020  Problem List  Principal Problem:    Acute respiratory failure with hypoxia ([facility] [address] )  Active Problems:    Eczema    Complete heart block (HCC)    Severe sepsis (HCC)    PAF (paroxysmal atrial fibrillation) (HCC)    NICOLASA (obstructive sleep apnea)    Type 2 diabetes mellitus with chronic kidney disease, without long-term current use of insulin (HCC)    Essential hypertension    Acute renal failure superimposed on stage 3 chronic kidney disease (HCC)    Anemia    Chronic diastolic (congestive) heart failure (HCC)    Elevated troponin I level    Type 2 diabetes mellitus with diabetic neuropathy, without long-term current use of insulin (HCC)    Pyogenic inflammation of bone (HCC)    Hyponatremia    Iron deficiency anemia    Anxiety    Hypokalemia            01/21/20 1132   Restrictions/Precautions   Weight Bearing Precautions Per Order Yes   LLE Weight Bearing Per Order NWB   Braces or Orthoses   (surgical shoe LLE)   Other Precautions Multiple lines; Fall Risk;Bed Alarm; Chair Alarm  (continuous BiPAP)   Pain Assessment   Pain Assessment No/denies pain   Pain Score No Pain   ADL   Where Assessed Edge of bed   LB Dressing Assistance 4  Minimal Assistance   LB Dressing Deficit Setup;Verbal cueing;Supervision/safety; Increased time to complete; Fasteners; Don/doff R shoe;Don/doff L shoe   LB Dressing Comments Pt requiring A to don surgical shoe, and min A for R regular shoe, and A to tie   Bed Mobility   Supine to Sit 4  Minimal assistance   Additional items Assist x 1;HOB elevated; Increased time required; Bedrails;LE management;Verbal cues   Transfers   Sit to Stand 4  Minimal assistance   Additional items Increased time required;Verbal cues; Assist x 2   Stand to Sit 4  Minimal assistance   Additional items Assist x 2;Armrests; Increased time required;Verbal cues   Additional Comments VC for safety with RW and maintaining NWB status Functional Mobility   Functional Mobility 4  Minimal assistance   Additional Comments x1, hopping to bedside chair, max VC for maintaining NWB status   Additional items Rolling walker   Cognition   Overall Cognitive Status WFL   Arousal/Participation Alert; Cooperative;Responsive   Attention Attends with cues to redirect   Orientation Level Oriented X4   Memory Decreased recall of precautions   Following Commands Follows one step commands without difficulty   Activity Tolerance   Activity Tolerance Patient limited by fatigue   Medical Staff Made Aware RN Neymar Thayer, PT Sammy   Assessment   Assessment Pt agreeable to skilled OT treatment in order to address functional mobility, functional transfers and ADL tasks  Pt supine in bed upon arrival, requiring min A from therapist for supine > sit  Once seated EOB pt requiring mod A for LB dressing, A to don surgical shoe and regular shoe, pt unable to fasten either  Pt agreeable to stand and transfer to bedside chair  Requiring min A x2 for sit>< stand, and max VC throughout for reminders for NWB status  Pt unable to maintain NWB status throughout  Goals reviewed with patient at this time and goals remain appropriate  Continue to recommend skilled OT treatment to address the following: weakness, decreased ROM, decreased functional strength, decreased functional balance, decreased activity tolerance and orthopedic restrictions  Continue to recommend STR      Plan   Goal Expiration Date 01/31/20   OT Treatment Day 4   OT Frequency 3-5x/wk   Recommendation   OT Discharge Recommendation Short Term Rehab   Equipment Recommended Bedside commode   OT - OK to Discharge   (to rehab when medically cleared)   Barthel Index   Feeding 10   Bathing 0   Grooming Score 5   Dressing Score 5   Bladder Score 0   Bowels Score 10   Toilet Use Score 5   Transfers (Bed/Chair) Score 10   Mobility (Level Surface) Score 0   Stairs Score 0   Barthel Index Score 45       Goals below remain appropriate: 1) Patient will complete UB ADLs w/ mod I using AE and AD as needed     2) Patient will complete LB ADLs w/ mod I using AE and AD as needed     3) Patient will complete toileting w/ mod I w/ G hygiene/thoroughness     4) Patient will complete bed mobility with Mod I without use of bed rails     5) Patient will tolerate therapeutic activities for greater than 15 min, in order to increase tolerance for functional activities     6) Patient will perform functional transfers with Mod I to/from all surfaces using DME as needed     7) Patient will complete functional mobility during ADL/IADL/leisure tasks with Mod I      8) Patient will demonstrate 100% adherence to NWB status on LLE during all functional activities w/o cues from therapist     Pt will benefit from continued OT services in order to maximize independence with ADL performance, functional mobility with RW, and improve overall endurance/strength required to complete functional tasks in preparation for discharge      At the end of the session, all needs met and pt seated in bedside chair, LEs elevated , chair alarm activated and Call bell within reach    [facility], OTR/L

## 2020-01-21 NOTE — PLAN OF CARE
Problem: PHYSICAL THERAPY ADULT  Goal: Performs mobility at highest level of function for planned discharge setting  See evaluation for individualized goals  Description  Treatment/Interventions: Functional transfer training, Therapeutic exercise, Elevations, Patient/family training, Equipment eval/education, Bed mobility, Gait training  Equipment Recommended: Katarzyna Mccray Wheel       See flowsheet documentation for full assessment, interventions and recommendations  Outcome: Progressing  Note:   Prognosis: Good  Problem List: Decreased strength, Decreased endurance, Decreased range of motion, Impaired balance, Decreased mobility, Impaired judgement, Decreased safety awareness, Decreased skin integrity, Orthopedic restrictions, Obesity  Assessment: Pt  supine in bed upon my arrival  Pt  reporting fatigue, however agreeable to therapeutic intervention  Performance of transfers requiring A of therapist with cues for hand placement/technique  Progressed with OOB mobility, noted pt  non-compliant with WBing status of LLE requiring increased instruction to maintain NWB status throughout treatment session  Progressed with a limited amb  trial with positioning seated in bedside chair at end of treatment session with alarm active  PT will continue to recommend d/c to rehab when medically stable for continued improvement of noted impairments above  Barriers to Discharge: None  Barriers to Discharge Comments: supportive wife, no LAURA  Recommendation: Short-term skilled PT     PT - OK to Discharge: Yes(if d/c to rehab when medically stable )    See flowsheet documentation for full assessment

## 2020-01-21 NOTE — PLAN OF CARE
Problem: Potential for Falls  Goal: Patient will remain free of falls  Description  INTERVENTIONS:  - Assess patient frequently for physical needs  -  Identify cognitive and physical deficits and behaviors that affect risk of falls    -  [place] fall precautions as indicated by assessment   - Educate patient/family on patient safety including physical limitations  - Instruct patient to call for assistance with activity based on assessment  - Modify environment to reduce risk of injury  - Consider OT/PT consult to assist with strengthening/mobility  Outcome: Progressing     Problem: PAIN - ADULT  Goal: Verbalizes/displays adequate comfort level or baseline comfort level  Description  Interventions:  - Encourage patient to monitor pain and request assistance  - Assess pain using appropriate pain scale  - Administer analgesics based on type and severity of pain and evaluate response  - Implement non-pharmacological measures as appropriate and evaluate response  - Consider cultural and social influences on pain and pain management  - Notify physician/advanced practitioner if interventions unsuccessful or patient reports new pain  Outcome: Progressing     Problem: INFECTION - ADULT  Goal: Absence or prevention of progression during hospitalization  Description  INTERVENTIONS:  - Assess and monitor for signs and symptoms of infection  - Monitor lab/diagnostic results  - Monitor all insertion sites, i e  indwelling lines, tubes, and drains  - Administer medications as ordered  - Instruct and encourage patient and family to use good hand hygiene technique  - Identify and instruct in appropriate isolation precautions for identified infection/condition   Outcome: Progressing     Problem: SAFETY ADULT  Goal: Maintain or return to baseline ADL function  Description  INTERVENTIONS:  -  Assess patient's ability to carry out ADLs; assess patient's baseline for ADL function and identify physical deficits which impact ability to perform ADLs (bathing, care of mouth/teeth, toileting, grooming, dressing, etc )  - Assess/evaluate cause of self-care deficits   - Assess range of motion  - Assess patient's mobility; develop plan if impaired  - Assess patient's need for assistive devices and provide as appropriate  - Encourage maximum independence but intervene and supervise when necessary  - Involve family in performance of ADLs  - Assess for home care needs following discharge   - Consider OT consult to assist with ADL evaluation and planning for discharge  - Provide patient education as appropriate  Outcome: Progressing  Goal: Maintain or return mobility status to optimal level  Description  INTERVENTIONS:  - Assess patient's baseline mobility status (ambulation, transfers, stairs, etc )    - Identify cognitive and physical deficits and behaviors that affect mobility  - Identify mobility aids required to assist with transfers and/or ambulation (gait belt, sit-to-stand, lift, walker, cane, etc )  - [place] fall precautions as indicated by assessment  - Record patient progress and toleration of activity level on Mobility SBAR; progress patient to next Phase/Stage  - Instruct patient to call for assistance with activity based on assessment  - Consider rehabilitation consult to assist with strengthening/weightbearing, etc   Outcome: Progressing     Problem: DISCHARGE PLANNING  Goal: Discharge to home or other facility with appropriate resources  Description  INTERVENTIONS:  - Identify barriers to discharge w/patient and caregiver  - Arrange for needed discharge resources and transportation as appropriate  - Identify discharge learning needs (meds, wound care, etc )  - Arrange for interpretive services to assist at discharge as needed  - Refer to Case Management Department for coordinating discharge planning if the patient needs post-hospital services based on physician/advanced practitioner order or complex needs related to functional status, cognitive ability, or social support system  Outcome: Progressing     Problem: Knowledge Deficit  Goal: Patient/family/caregiver demonstrates understanding of disease process, treatment plan, medications, and discharge instructions  Description  Complete learning assessment and assess knowledge base  Interventions:  - Provide teaching at level of understanding  - Provide teaching via preferred learning methods  Outcome: Progressing     Problem: CARDIOVASCULAR - ADULT  Goal: Absence of cardiac dysrhythmias or at baseline rhythm  Description  INTERVENTIONS:  - Continuous cardiac monitoring, vital signs, obtain 12 lead EKG if ordered  - Administer antiarrhythmic and heart rate control medications as ordered  - Monitor electrolytes and administer replacement therapy as ordered  Outcome: Progressing     Problem: Prexisting or High Potential for Compromised Skin Integrity  Goal: Skin integrity is maintained or improved  Description  INTERVENTIONS:  - Identify patients at risk for skin breakdown  - Assess and monitor skin integrity  - Assess and monitor nutrition and hydration status  - Monitor labs   - Assess for incontinence   - Turn and reposition patient  - Assist with mobility/ambulation  - Relieve pressure over bony prominences  - Avoid friction and shearing  - Provide appropriate hygiene as needed including keeping skin clean and dry  - Evaluate need for skin moisturizer/barrier cream  - Collaborate with interdisciplinary team   - Patient/family teaching  - Consider wound care consult   Outcome: Progressing     Problem: Nutrition/Hydration-ADULT  Goal: Nutrient/Hydration intake appropriate for improving, restoring or maintaining nutritional needs  Description  Monitor and assess patient's nutrition/hydration status for malnutrition  Collaborate with interdisciplinary team and initiate plan and interventions as ordered  Monitor patient's weight and dietary intake as ordered or per policy   Utilize nutrition screening tool and intervene as necessary  Determine patient's food preferences and provide high-protein, high-caloric foods as appropriate       INTERVENTIONS:  - Monitor oral intake, urinary output, labs, and treatment plans  - Assess nutrition and hydration status and recommend course of action  - Evaluate amount of meals eaten  - Assist patient with eating if necessary   - Allow adequate time for meals  - Recommend/ encourage appropriate diets, oral nutritional supplements, and vitamin/mineral supplements  - Order, calculate, and assess calorie counts as needed  - Recommend, monitor, and adjust tube feedings and TPN/PPN based on assessed needs  - Assess need for intravenous fluids  - Provide specific nutrition/hydration education as appropriate  - Include patient/family/caregiver in decisions related to nutrition  Outcome: Progressing     Problem: SKIN/TISSUE INTEGRITY - ADULT  Goal: Skin integrity remains intact  Description  INTERVENTIONS  - Identify patients at risk for skin breakdown  - Assess and monitor skin integrity  - Assess and monitor nutrition and hydration status  - Monitor labs (i e  albumin)  - Assess for incontinence   - Turn and reposition patient  - Assist with mobility/ambulation  - Relieve pressure over bony prominences  - Avoid friction and shearing  - Provide appropriate hygiene as needed including keeping skin clean and dry  - Evaluate need for skin moisturizer/barrier cream  - Collaborate with interdisciplinary team (i e  Nutrition, Rehabilitation, etc )   - Patient/family teaching  Outcome: Progressing  Goal: Incision(s), wounds(s) or drain site(s) healing without S/S of infection  Description  INTERVENTIONS  - Assess and document risk factors for skin impairment   - Assess and document dressing, incision, wound bed, drain sites and surrounding tissue  - Consider nutrition services referral as needed  - Oral mucous membranes remain intact  - Provide patient/ family education  Outcome: Progressing  Goal: Oral mucous membranes remain intact  Description  INTERVENTIONS  - Assess oral mucosa and hygiene practices  - Implement preventative oral hygiene regimen  - Implement oral medicated treatments as ordered  - Initiate Nutrition services referral as needed  Outcome: Progressing

## 2020-01-21 NOTE — PROGRESS NOTES
Progress Note - Infectious Disease   Brandon Seo 75 y o  male MRN: [id number]  Unit/Bed#: E4 -01 Encounter: [id number]      Impression/Plan:  1  Sepsis   POA   Fever and leukocytosis   Likely secondary to MSSA bacteremia with left hallux wound and underlying osteopmyelitis as presumed source  No other clear source appreciated  Despite being systemically ill the patient has been hemodynamically stable  Repeat blood cultures were negative after 5 days  TTE and ELBERT were negative for valvlular vegetation and pacemaker lead abnormalities  Patient now with evidence of CHF on chest xray, is temporally required BiPAP support and is on a Bumex drip   -antibiotic as below  -monitor CBC and BMP  -monitor vitals  -supportive care     2  Genna Evans in both sets of patient's initial blood cultures  Suspect patient's left great toe ulcer was source   Patient does have an implanted pacemaker  TTE and ELBERT were negative  Repeat blood cultures were clear after 5 days  Patient is currently receiving IV cefazolin and is tolerating without difficulty  He will complete 4 weeks of IV antibiotic treatment, through 2/6/2020   -continue IV cefazolin, dosed for renal function, through 2/6/2020 for 4 weeks of antibiotic treatment  -remove PICC after patient's final dose of IV antibiotics on 2/6/2020  -weekly CBCD and BMP while on IV antibiotics  -monitor vitals  -follow up in the outpatient ID office on 1/28/2020 at [address] [place] [address] with PA-C, Loni Meckel  -antibiotic and lab scripts dropped off in patient chart on 1/15/2020      3  Left hallux ulceration   With osteomyelitis as his wound does probe to bone and an x-ray of the left foot was suggestive of bony erosion at the 1st proximal phalanx   Purulence noted during initial podiatry wound exam, concern for possible infectious tenosynovitis   I suspect this was the source of patient's bacteremia above  Tereza [place] is now status post amputation of hallux for presumed surgical cure of the ulcer and osteomyelitis  Intraop wound cultures showed MSSA  He had additional I&D and removal and sesamoid bone on 1/12/2020    -serial left foot exams  -follow up intraop wound cultures  -local wound care per Podiatry  -continue close follow-up with Podiatry     4  Acute kidney injury  On CKD  Per patient's medical records it appears his baseline creatinine has been approximately 1  Consider secondary to volume overload  Also consider urinary retention, a ojeda is now in place  He is following closely with nephrology  He is now on Bumex drip   -check BMP tomorrow  -dose adjust antibiotics for renal function as needed  -continue close follow up with nephrology     5  Acute hypoxic respiratory failure   Suspect this is all secondary to pulmonary edema and diastolic heart failure   BNP and troponin were elevated upon admission  Initial chest x-ray and CT of the chest did not show consolidations indicative of a bacterial process  New chest imaging shows persistent CHF  Patient temporarily required BiPAP support and is now on a Bumex drip   -diuresis per Cardiology/nephrology  -wean BiPAP support as tolerated per primary service  -monitor vitals  -monitor respiratory status  -continue close follow-up with cardiology  -continue close follow-up with nephrology     6  Type 2 diabetes mellitus with neuropathy   Patient's last hemoglobin A1c was 6 4% on 01/07/2020   This is risk factor for wounds and infection   Recommend tight glycemic control for overall health, especially in the setting of wound infection   -blood glucose management per primary service     7  Chronic diastolic heart failure  In setting of aortic and mitral valve stenosis and complete heart block   He has a [employer] dual chamber pacemaker in place   Now with Staph aureus bacteremia  TTE and ELBERT were negative  He is following closely with Cardiology   -continue follow-up with Cardiology     8  Paroxysmal AFib   On Eliquis      Above plan was discussed in detail with patient and his wife at the bedside  Antibiotics:  Cefazolin 12  Antibiotics 15    Subjective:  Patient reports he is feeling much better today  States he is pleased to be off the BiPAP and feels that his breathing is very study  He shows me his pulse oximeter and is pleased that his oxygen saturation has remained so high this afternoon  He is not coughing and does not feel short of breath at this moment  His wife at the bedside is very relieved that his breathing looks so much better today  Patient otherwise has no complaints or concerns  Tells me his left foot dressing has remained clean/dry/intact  He has no concerns with his Jones catheter  He has no concerns with his PICC line  He denies fevers, chills, sweats, shakes; no nausea, vomiting, abdominal pain, diarrhea  Objective:  Vitals:  Temp:  [98 3 °F (36 8 °C)-98 8 °F (37 1 °C)] 98 6 °F (37 °C)  HR:  [65-95] 67  Resp:  [19-20] 20  BP: (137-180)/(64-72) 137/71  SpO2:  [90 %-98 %] 97 %  Temp (24hrs), Av 6 °F (37 °C), Min:98 3 °F (36 8 °C), Max:98 8 °F (37 1 °C)  Current: Temperature: 98 6 °F (37 °C)    Physical Exam:   General Appearance:  Alert, interactive, nontoxic, no acute distress  Patient is wearing his glasses  Throat: Oropharynx moist without lesions  Lungs:   Faint basilar crackles but is otherwise clear to auscultation bilaterally; no wheezes, rhonchi or rales; respirations unlabored  He is on nasal cannula O2  O2 saturation on pulse ox was 98% during my visit  Heart:  RRR; no murmur, rub or gallop   Abdomen:   Soft, non-tender, non-distended, positive bowel sounds  Genitourinary: Jones catheter intact, urine output is light yellow without sediment   Extremities: No clubbing or cyanosis; mild bilateral lower extremity edema; left foot dressing is clean/dry/intact, no spreading erythema from bandage site; right upper arm PICC intact, no leakage or spreading erythema from site, dressing is clean and dry  Skin: No new rashes, lesions, or draining wounds noted on exposed skin       Labs, Imaging, & Other studies:   All pertinent labs and imaging studies were personally reviewed  Results from last 7 days   Lab Units 01/21/20  0542 01/19/20  0439 01/17/20  0643   WBC Thousand/uL 12 20* 14 89* 14 88*   HEMOGLOBIN g/dL 7 1* 9 4* 8 0*   PLATELETS Thousands/uL 394* 406* 477*     Results from last 7 days   Lab Units 01/21/20  0542   POTASSIUM mmol/L 3 4*   CHLORIDE mmol/L 98*   CO2 mmol/L 22   BUN mg/dL 118*   CREATININE mg/dL 2 87*   EGFR ml/min/1 73sq m 22   CALCIUM mg/dL 8 3

## 2020-01-21 NOTE — PROGRESS NOTES
Progress Note - Cardiology   Jaquelin Akhtar 75 y o  male MRN: [id number]  Unit/Bed#: E4 -01 Encounter: [id number]      Assessment/Recommendations/Discussion:   1  Acute hypoxic respiratory failure  2  Acute on chronic diastolic CHF  3  Sepsis syndrome/MSSA bacteremia  4  Moderate AS, MS  5  CHB, PPM in place  6  HTN  7  Dyslipidemia  8  ELZBIETA on CKD  9  Non MI related troponin elevation secondary to sepsis/CHF/ELZBIETA/volume overload  10  DM  11  PAF, on Eliquis AC    PLAN   Having good response to the increased Bumex drip dose of 1 milligram/hour   Creatinine stable   Sodium 133, potassium 3 4   Continue with Bumex drip at 1 milligram/hour   Replete electrolytes p r n   Eliquis for anticoagulation   Amlodipine for blood pressure   Will reassess tomorrow    Subjective:   HPI  Improving with the Bumex drip  Negative 1928cc  Creatinine stable  Review of Systems: As noted in HPI  Rest of ROS is negative  Vitals:   /71 (BP Location: Right arm)   Pulse 67   Temp 98 6 °F (37 °C) (Tympanic)   Resp 20   Wt 102 kg (225 lb 5 oz)   SpO2 97%   BMI 32 62 kg/m²   Vitals:    01/19/20 0600 01/20/20 0600   Weight: 103 kg (226 lb 3 1 oz) 102 kg (225 lb 5 oz)       Intake/Output Summary (Last 24 hours) at 1/21/2020 1311  Last data filed at 1/21/2020 0601  Gross per 24 hour   Intake 121 3 ml   Output 2050 ml   Net -1928 7 ml       Physical Exam:  General appearance: alert and oriented, in no acute distress  Head: Normocephalic, without obvious abnormality, atraumatic  Eyes: conjunctivae/corneas clear  Anicteric    Neck: no adenopathy, no carotid bruit, no JVD  Lungs:  Decreased breath sounds bilaterally with some faint rales at the bilateral bases  Heart: regular rate and rhythm, S1, S2 normal, no murmur, no click, rub or gallop  Abdomen: soft, non-tender; bowel sounds normal; no masses,  no organomegaly  Extremities:  Left lower extremity edema 2+, resolution of right lower extremity edema  Skin: Skin color, texture, turgor normal  No rashes or lesions     TELEMETRY:  Paced rhythm  Lab Results:  Results from last 7 days   Lab Units 01/21/20  0542   WBC Thousand/uL 12 20*   HEMOGLOBIN g/dL 7 1*   HEMATOCRIT % 21 5*   PLATELETS Thousands/uL 394*     Results from last 7 days   Lab Units 01/21/20  0542   POTASSIUM mmol/L 3 4*   CHLORIDE mmol/L 98*   CO2 mmol/L 22   BUN mg/dL 118*   CREATININE mg/dL 2 87*   CALCIUM mg/dL 8 3     Results from last 7 days   Lab Units 01/21/20  0542   POTASSIUM mmol/L 3 4*   CHLORIDE mmol/L 98*   CO2 mmol/L 22   BUN mg/dL 118*   CREATININE mg/dL 2 87*   CALCIUM mg/dL 8 3           Medications:    Current Facility-Administered Medications:     acetaminophen (TYLENOL) tablet 650 mg, 650 mg, Oral, Q6H PRN, Melissa Ambron, DO, 650 mg at 01/18/20 1215    allopurinol (ZYLOPRIM) tablet 300 mg, 300 mg, Oral, QAM, Jaleesa Velazquez, DPM, 300 mg at 01/21/20 0840    amLODIPine (NORVASC) tablet 10 mg, 10 mg, Oral, Daily, [facility], 10 mg at 01/21/20 0841    apixaban (ELIQUIS) tablet 5 mg, 5 mg, Oral, BID, Melissa Ambron, DO, 5 mg at 01/21/20 0841    bumetanide (BUMEX) 12 5 mg infusion 50 mL, 1 mg/hr, Intravenous, Continuous, Bean Hinds DO, Last Rate: 4 mL/hr at 01/21/20 0326, 1 mg/hr at 01/21/20 0326    ceFAZolin (ANCEF) IVPB (premix) 1,000 mg, 1,000 mg, Intravenous, Q8H, PAT Rich, Last Rate: 100 mL/hr at 01/21/20 1214, 1,000 mg at 01/21/20 1214    docusate sodium (COLACE) capsule 100 mg, 100 mg, Oral, BID, Melissa Ambron, DO, 100 mg at 01/21/20 0841    ferrous gluconate (FERGON) tablet 324 mg, 324 mg, Oral, BID AC, Melissa Alonso DO, Stopped at 01/21/20 0635    guaiFENesin (MUCINEX) 12 hr tablet 600 mg, 600 mg, Oral, BID, Jaleesa Velazquez DPM, 600 mg at 01/21/20 0841    insulin glargine (LANTUS) subcutaneous injection 22 Units 0 22 mL, 22 Units, Subcutaneous, HS, Melissa Alonso DO, 22 Units at 01/20/20 2135    insulin lispro (HumaLOG) 100 units/mL subcutaneous injection 2-12 Units, 2-12 Units, Subcutaneous, TID AC, 10 Units at 01/20/20 1210 **AND** Fingerstick Glucose (POCT), , , TID AC, Marquise Hamilton, DPM    ipratropium-albuterol (DUO-NEB) 0 5-2 5 mg/3 mL inhalation solution 3 mL, 3 mL, Nebulization, Q6H PRN, Marquise Hamilton, DPFETLON, 3 mL at 01/19/20 1243    LORazepam (ATIVAN) tablet 0 5 mg, 0 5 mg, Oral, Q8H PRN, Melissa Ambron, DO, 0 5 mg at 01/21/20 0748    melatonin tablet 6 mg, 6 mg, Oral, HS, Marquise Hamilton, DPM, 6 mg at 01/20/20 2135    metoprolol tartrate (LOPRESSOR) tablet 50 mg, 50 mg, Oral, Q12H [facility], Marquise Hamilton, DPM, 50 mg at 01/21/20 0841    oxyCODONE (ROXICODONE) IR tablet 5 mg, 5 mg, Oral, Q4H PRN, Marquise Hamilton, DPM, 5 mg at 01/12/20 1709    potassium chloride (K-DUR,KLOR-CON) CR tablet 20 mEq, 20 mEq, Oral, BID, Rujul Hinds, DO, 20 mEq at 01/21/20 0841    pravastatin (PRAVACHOL) tablet 40 mg, 40 mg, Oral, Daily With Richard Ely DPFELTON, 40 mg at 01/20/20 1818    tamsulosin (FLOMAX) capsule 0 4 mg, 0 4 mg, Oral, Daily With Richard Ely DPFELTON, 0 4 mg at 01/20/20 1817    This note was completed in part utilizing [employer] Software  Grammatical errors, random word insertions, spelling mistakes, and incomplete sentences may be an occasional consequence of this system secondary to software limitations, ambient noise, and hardware issues  If you have any questions or concerns about the content, text, or information contained within the body of this dictation, please contact the provider for clarification      Tisha Fabry, DO, [facility]  1/21/2020 1:11 PM

## 2020-01-21 NOTE — PLAN OF CARE
Problem: OCCUPATIONAL THERAPY ADULT  Goal: Performs self-care activities at highest level of function for planned discharge setting  See evaluation for individualized goals  Description  Treatment Interventions: ADL retraining, UE strengthening/ROM, Endurance training, Patient/family training, Equipment evaluation/education, Compensatory technique education, Energy conservation, Activityengagement          See flowsheet documentation for full assessment, interventions and recommendations  Note:   Limitation: Decreased ADL status, Decreased Safe judgement during ADL, Decreased cognition, Decreased endurance, Decreased self-care trans, Decreased high-level ADLs  Prognosis: Good  Assessment: Pt agreeable to skilled OT treatment in order to address functional mobility, functional transfers and ADL tasks  Pt supine in bed upon arrival, requiring min A from therapist for supine > sit  Once seated EOB pt requiring mod A for LB dressing, A to don surgical shoe and regular shoe, pt unable to fasten either  Pt agreeable to stand and transfer to bedside chair  Requiring min A x2 for sit>< stand, and max VC throughout for reminders for NWB status  Pt unable to maintain NWB status throughout  Goals reviewed with patient at this time and goals remain appropriate  Continue to recommend skilled OT treatment to address the following: weakness, decreased ROM, decreased functional strength, decreased functional balance, decreased activity tolerance and orthopedic restrictions  Continue to recommend STR        OT Discharge Recommendation: Short Term Rehab  OT - OK to Discharge: (to rehab when medically cleared)

## 2020-01-21 NOTE — SOCIAL WORK
Cm reviewed pt care coordination round  Pt is not medically cleared for d/c  Cm will f/u for final medical clearance and dcp

## 2020-01-21 NOTE — PROGRESS NOTES
Progress Note - Nephrology   Bi Knee 75 y o  male MRN: [id number]  Unit/Bed#: E4 -01 Encounter: [id number]      Assessment / Plan:  1  Acute kidney injury, POA, suspecting likely cardiorenal syndrome, could be component of obstructive uropathy given urinary retention +/- ATN  -ojeda in place  -Renal u/s shows echogenic kidneys  -UA with microscopy shows trace ketones, large blood, greater than 300 protein, 4-10 WBCs, 4-10 rbc's, moderate bacteria with 0-1 fine granular casts, 1% eosinophils noted  Granular casts are suggestive of ATN  -sCr plateaued in the high 2s with slight improvement today  -last sCr 2 87    No signs of uremia  No urgent RRT needs  -monitor renal indices on bumex gtt per cardiology   2  Hyponatremia, suspecting secondary to volume overload plus renal dysfunction - improving with diuresis  Manuela low at 14  Urine osm 336, TSH, cortisol ok  Serum osm high at 311  Prior lipid panel ok  3  Hypokalemia-potassium 3 4, likely diuretic-induced, on b i d  Standing potassium chloride supplementation, monitor potassium/Mag levels  4  Chronic kidney disease 3, previous baseline creatinine in the low 1s with associated nephrotic range proteinuria, follows with Dr Peterson Rendon  5  Volume overload - monitor daily weight, I/O on bumex gtt per cardiology  Might need to consider UF on HD if diuresis ineffective  Thankfully, adequate UOP noted  6  MSSA bacteremia and sepsis d/t left hallux wound - on Ancef through 2/6 as per Infectious Disease  7  Urinary retention - with Ojeda catheter, consider urology consult  8  HTN - BP acceptable for level of ELZBIETA on CKD  Continue amlodipine 10 mg p o  Daily, metoprolol 50 mg p o  Q 12 hours with hold parameters  Also on Flomax  9  Anemia, iron deficiency- Hgb 9 4-->7 1, on oral iron  Avoiding venofer in light of bacteremia  Monitor CBC  Transfuse prn Hgb < 7    Iron low at 33, ferritin 94, iron sat 11%, TIBC 314        Subjective:   He denies any chest pain or shortness of breath  No complaints  He is on a bumex gtt and urinating  Objective:     Vitals: Blood pressure 137/71, pulse 67, temperature 98 6 °F (37 °C), temperature source Tympanic, resp  rate 20, weight 102 kg (225 lb 5 oz), SpO2 97 %  ,Body mass index is 32 62 kg/m²  Temp (24hrs), Av 6 °F (37 °C), Min:98 3 °F (36 8 °C), Max:98 8 °F (37 1 °C)      Weight (last 2 days)     Date/Time   Weight    20 0600   102 (225 31)    20 0600   103 (226 19)                Intake/Output Summary (Last 24 hours) at 2020 1334  Last data filed at 2020 0601  Gross per 24 hour   Intake 121 3 ml   Output  ml   Net - 7 ml     I/O last 24 hours: In: 121 3 [I V :121 3]  Out:  [Urine:]        Physical Exam:   Physical Exam   Constitutional: He appears well-developed and well-nourished  No distress  On bipap, obese   HENT:   Head: Normocephalic and atraumatic  Mouth/Throat: No oropharyngeal exudate  Eyes: Right eye exhibits no discharge  Left eye exhibits no discharge  No scleral icterus  Neck: Neck supple  No thyromegaly present  Cardiovascular: Normal rate, regular rhythm and normal heart sounds  Pulmonary/Chest: Effort normal and breath sounds normal  He has no wheezes  He has no rales  Abdominal: Soft  Bowel sounds are normal  He exhibits no distension  There is no tenderness  Genitourinary:   Genitourinary Comments: +ojeda   Musculoskeletal: Normal range of motion  He exhibits edema (b/l ankles)  Left foot wrapped   Neurological: He is alert  awake   Skin: Skin is warm and dry  No rash noted  He is not diaphoretic  Psychiatric: He has a normal mood and affect  His behavior is normal    Vitals reviewed        Invasive Devices     Peripherally Inserted Central Catheter Line            PICC Line 51/15/15 Right Basilic 8 days          Drain            Urethral Catheter Non-latex 18 Fr  2 days                Medications:    Scheduled Meds:  Current Facility-Administered Medications:  acetaminophen 650 mg Oral Q6H PRN Melissa Ambron, DO    allopurinol 300 mg Oral QAM Delorise Dial, DPM    amLODIPine 10 mg Oral Daily [facility], MOE    apixaban 5 mg Oral BID Melissa Ambron, DO    bumetanide 1 mg/hr Intravenous Continuous Rujul Hinds, DO Last Rate: 1 mg/hr (01/21/20 0326)   cefazolin 1,000 mg Intravenous Q8H PAT Rich Last Rate: 1,000 mg (01/21/20 1214)   docusate sodium 100 mg Oral BID Melissa Ambron, DO    ferrous gluconate 324 mg Oral BID AC Melissa Ambron, DO    guaiFENesin 600 mg Oral BID Delorise Dial, DPM    insulin glargine 22 Units Subcutaneous HS Melissa Ambron, DO    insulin lispro 2-12 Units Subcutaneous TID AC Delorise Dial, DPM    ipratropium-albuterol 3 mL Nebulization Q6H PRN Delorise Dial, DPM    LORazepam 0 5 mg Oral Q8H PRN Melissa Ambron, DO    melatonin 6 mg Oral HS Delorise Dial, DPM    metoprolol tartrate 50 mg Oral Q12H [facility] Delorise Dial, DPM    oxyCODONE 5 mg Oral Q4H PRN Delorise Dial, DPM    potassium chloride 20 mEq Oral BID Rujul Hinds, DO    pravastatin 40 mg Oral Daily With Keyla Limes Kasey, DPM    tamsulosin 0 4 mg Oral Daily With [facility], DPM        PRN Meds:   acetaminophen    ipratropium-albuterol    LORazepam    oxyCODONE    Continuous Infusions:  bumetanide 1 mg/hr Last Rate: 1 mg/hr (01/21/20 0326)           LAB RESULTS:      Results from last 7 days   Lab Units 01/21/20  0542 01/20/20  0617 01/20/20  0100 01/19/20  1825 01/19/20  1211 01/19/20  0439 01/18/20  1654  01/17/20  0643 01/16/20  0453 01/15/20  0526   WBC Thousand/uL 12 20*  --   --   --   --  14 89*  --   --  14 88* 13 37* 16 24*   HEMOGLOBIN g/dL 7 1*  --   --   --   --  9 4*  --   --  8 0* 7 4* 7 8*   HEMATOCRIT % 21 5*  --   --   --   --  28 7*  --   --  24 9* 23 1* 23 9*   PLATELETS Thousands/uL 394*  --   --   --   --  406*  --   --  477* 458* 481*   NEUTROS PCT % 73  --   --   --   --   --   --   --   --   --   --    LYMPHS PCT % 10*  --   -- --   --   --   --   --   --   --   --    MONOS PCT % 11  --   --   --   --   --   --   --   --   --   --    EOS PCT % 5  --   --   --   --   --   --   --   --   --   --    POTASSIUM mmol/L 3 4* 3 6 3 7 3 7 3 7 3 7 3 8   < > 3 5 3 8 3 6   CHLORIDE mmol/L 98* 95* 97* 96* 96* 95* 97*   < > 100 101 98*   CO2 mmol/L 22 21 22 23 24 23 24   < > 23 22 24   BUN mg/dL 118* 110* 114* 110* 108* 102* 96*   < > 87* 89* 86*   CREATININE mg/dL 2 87* 2 96* 2 98* 2 91* 2 90* 2 77* 2 78*   < > 2 17* 2 00* 2 06*   CALCIUM mg/dL 8 3 8 3 8 0* 8 1* 8 2* 8 3 8 6   < > 8 5 8 5 8 3    < > = values in this interval not displayed  CUTURES:  Lab Results   Component Value Date    BLOODCX No Growth After 5 Days  01/09/2020    BLOODCX No Growth After 5 Days  01/09/2020    BLOODCX Staphylococcus aureus (A) 01/07/2020    BLOODCX Staphylococcus aureus (A) 01/07/2020    BLOODCX No Growth After 5 Days  05/15/2019    BLOODCX No Growth After 5 Days  05/15/2019    BLOODCX No Growth After 5 Days  05/02/2019    BLOODCX No Growth After 5 Days  05/02/2019                 Portions of the record may have been created with voice recognition software  Occasional wrong word or "sound a like" substitutions may have occurred due to the inherent limitations of voice recognition software  Read the chart carefully and recognize, using context, where substitutions have occurred  If you have any questions, please contact the dictating provider

## 2020-01-21 NOTE — PROGRESS NOTES
Pt has c/o pain in left arm  Upon assessment the arm is noticeably more edematous than right arm  SLIM MD made aware  Orders to elevate extremity and ultrasound to be completed  Pt also has complaints of scrotum pain  Pt has redness and swelling to area, RN will elevate, powder applied    Will have medical team order nystatin  Call bell in hand  Phone in reach  [facility]

## 2020-01-21 NOTE — PHYSICAL THERAPY NOTE
Physical Therapy Progress Note     01/21/20 1140   Pain Assessment   Pain Assessment No/denies pain   Pain Score No Pain   Hospital Pain Intervention(s) Ambulation/increased activity;Repositioned   Response to Interventions Tolerated  Restrictions/Precautions   Weight Bearing Precautions Per Order Yes   LLE Weight Bearing Per Order NWB   Braces or Orthoses Other (Comment)  (surgical shoe LLE)   Other Precautions Multiple lines; Fall Risk;Cognitive; Chair Alarm; Bed Alarm  (continuous bi-pap)   General   Chart Reviewed Yes   Response to Previous Treatment Patient reporting fatigue but able to participate  Family/Caregiver Present No   Subjective   Subjective Willing to participate in therapy this AM    Bed Mobility   Supine to Sit 4  Minimal assistance   Additional items Assist x 1;HOB elevated; Bedrails; Increased time required;Leg ;Verbal cues;LE management   Transfers   Sit to Stand 4  Minimal assistance   Additional items Assist x 2;Bedrails; Increased time required;Verbal cues   Stand to Sit 4  Minimal assistance   Additional items Assist x 2;Armrests; Increased time required;Verbal cues   Ambulation/Elevation   Gait pattern Decreased foot clearance  (hop to pattern with RW)   Gait Assistance 4  Minimal assist   Additional items Assist x 2;Verbal cues; Tactile cues   Assistive Device Rolling walker   Distance 5'   Balance   Static Sitting Good   Dynamic Sitting Fair +   Static Standing Fair -   Dynamic Standing Poor +   Ambulatory Poor   Endurance Deficit   Endurance Deficit Yes   Endurance Deficit Description fatigue/weakness   Activity Tolerance   Activity Tolerance Patient limited by fatigue   Medical Staff Made Aware CESAR Crowell   Nurse Made Aware Yes   Assessment   Prognosis Good   Problem List Decreased strength;Decreased endurance;Decreased range of motion; Impaired balance;Decreased mobility; Impaired judgement;Decreased safety awareness;Decreased skin integrity;Orthopedic restrictions; Obesity Assessment Pt  supine in bed upon my arrival  Pt  reporting fatigue, however agreeable to therapeutic intervention  Performance of transfers requiring A of therapist with cues for hand placement/technique  Progressed with OOB mobility, noted pt  non-compliant with WBing status of LLE requiring increased instruction to maintain NWB status throughout treatment session  Progressed with a limited amb  trial with positioning seated in bedside chair at end of treatment session with alarm active  PT will continue to recommend d/c to rehab when medically stable for continued improvement of noted impairments above  Goals   Patient Goals To get better  STG Expiration Date 01/27/20   PT Treatment Day 4   Plan   Treatment/Interventions Functional transfer training;LE strengthening/ROM; Endurance training;Bed mobility;Gait training;Spoke to nursing;Spoke to case management;OT   Progress Slow progress, decreased activity tolerance   PT Frequency Other (Comment)  (4-5x/wk)   Recommendation   Recommendation Short-term skilled PT   Equipment Recommended Walker  (RW)   PT - OK to Discharge Yes  (if d/c to rehab when medically stable )     Libia Gary, PTA

## 2020-01-22 ENCOUNTER — APPOINTMENT (INPATIENT)
Dept: NON INVASIVE DIAGNOSTICS | Facility: HOSPITAL | Age: 65
DRG: 853 | End: 2020-01-22
Payer: MEDICARE

## 2020-01-22 PROBLEM — M86.9 PYOGENIC INFLAMMATION OF BONE (HCC): Status: RESOLVED | Noted: 2020-01-07 | Resolved: 2020-01-22

## 2020-01-22 PROBLEM — E87.1 HYPONATREMIA: Status: RESOLVED | Noted: 2020-01-20 | Resolved: 2020-01-22

## 2020-01-22 LAB
ANION GAP SERPL CALCULATED.3IONS-SCNC: 10 MMOL/L (ref 4–13)
BASOPHILS # BLD AUTO: 0.07 THOUSANDS/ΜL (ref 0–0.1)
BASOPHILS NFR BLD AUTO: 1 % (ref 0–1)
BUN SERPL-MCNC: 122 MG/DL (ref 5–25)
CALCIUM SERPL-MCNC: 8.4 MG/DL (ref 8.3–10.1)
CHLORIDE SERPL-SCNC: 98 MMOL/L (ref 100–108)
CO2 SERPL-SCNC: 23 MMOL/L (ref 21–32)
CREAT SERPL-MCNC: 2.75 MG/DL (ref 0.6–1.3)
EOSINOPHIL # BLD AUTO: 0.56 THOUSAND/ΜL (ref 0–0.61)
EOSINOPHIL NFR BLD AUTO: 6 % (ref 0–6)
ERYTHROCYTE [DISTWIDTH] IN BLOOD BY AUTOMATED COUNT: 14 % (ref 11.6–15.1)
GFR SERPL CREATININE-BSD FRML MDRD: 23 ML/MIN/1.73SQ M
GLUCOSE SERPL-MCNC: 170 MG/DL (ref 65–140)
GLUCOSE SERPL-MCNC: 177 MG/DL (ref 65–140)
GLUCOSE SERPL-MCNC: 190 MG/DL (ref 65–140)
GLUCOSE SERPL-MCNC: 201 MG/DL (ref 65–140)
GLUCOSE SERPL-MCNC: 267 MG/DL (ref 65–140)
GLUCOSE SERPL-MCNC: 69 MG/DL (ref 65–140)
GLUCOSE SERPL-MCNC: 87 MG/DL (ref 65–140)
HCT VFR BLD AUTO: 22.9 % (ref 36.5–49.3)
HGB BLD-MCNC: 7.5 G/DL (ref 12–17)
IMM GRANULOCYTES # BLD AUTO: 0.04 THOUSAND/UL (ref 0–0.2)
IMM GRANULOCYTES NFR BLD AUTO: 0 % (ref 0–2)
LYMPHOCYTES # BLD AUTO: 1.06 THOUSANDS/ΜL (ref 0.6–4.47)
LYMPHOCYTES NFR BLD AUTO: 10 % (ref 14–44)
MAGNESIUM SERPL-MCNC: 2.3 MG/DL (ref 1.6–2.6)
MCH RBC QN AUTO: 29.3 PG (ref 26.8–34.3)
MCHC RBC AUTO-ENTMCNC: 32.8 G/DL (ref 31.4–37.4)
MCV RBC AUTO: 90 FL (ref 82–98)
MONOCYTES # BLD AUTO: 1.32 THOUSAND/ΜL (ref 0.17–1.22)
MONOCYTES NFR BLD AUTO: 13 % (ref 4–12)
NEUTROPHILS # BLD AUTO: 7.13 THOUSANDS/ΜL (ref 1.85–7.62)
NEUTS SEG NFR BLD AUTO: 70 % (ref 43–75)
NRBC BLD AUTO-RTO: 0 /100 WBCS
PLATELET # BLD AUTO: 379 THOUSANDS/UL (ref 149–390)
PMV BLD AUTO: 9 FL (ref 8.9–12.7)
POTASSIUM SERPL-SCNC: 3.3 MMOL/L (ref 3.5–5.3)
RBC # BLD AUTO: 2.56 MILLION/UL (ref 3.88–5.62)
SODIUM SERPL-SCNC: 131 MMOL/L (ref 136–145)
WBC # BLD AUTO: 10.18 THOUSAND/UL (ref 4.31–10.16)

## 2020-01-22 PROCEDURE — 94660 CPAP INITIATION&MGMT: CPT

## 2020-01-22 PROCEDURE — 99232 SBSQ HOSP IP/OBS MODERATE 35: CPT

## 2020-01-22 PROCEDURE — 83735 ASSAY OF MAGNESIUM: CPT | Performed by: INTERNAL MEDICINE

## 2020-01-22 PROCEDURE — 93971 EXTREMITY STUDY: CPT

## 2020-01-22 PROCEDURE — 99232 SBSQ HOSP IP/OBS MODERATE 35: CPT | Performed by: INTERNAL MEDICINE

## 2020-01-22 PROCEDURE — 93971 EXTREMITY STUDY: CPT | Performed by: SURGERY

## 2020-01-22 PROCEDURE — 99233 SBSQ HOSP IP/OBS HIGH 50: CPT | Performed by: INTERNAL MEDICINE

## 2020-01-22 PROCEDURE — 80048 BASIC METABOLIC PNL TOTAL CA: CPT | Performed by: INTERNAL MEDICINE

## 2020-01-22 PROCEDURE — 85025 COMPLETE CBC W/AUTO DIFF WBC: CPT | Performed by: INTERNAL MEDICINE

## 2020-01-22 PROCEDURE — 82948 REAGENT STRIP/BLOOD GLUCOSE: CPT

## 2020-01-22 RX ADMIN — METOPROLOL TARTRATE 50 MG: 50 TABLET, FILM COATED ORAL at 08:30

## 2020-01-22 RX ADMIN — DOCUSATE SODIUM 100 MG: 100 CAPSULE, LIQUID FILLED ORAL at 08:31

## 2020-01-22 RX ADMIN — POTASSIUM CHLORIDE 20 MEQ: 1500 TABLET, EXTENDED RELEASE ORAL at 17:35

## 2020-01-22 RX ADMIN — FERROUS GLUCONATE 324 MG: 324 TABLET ORAL at 17:35

## 2020-01-22 RX ADMIN — LORAZEPAM 0.5 MG: 1 TABLET ORAL at 11:15

## 2020-01-22 RX ADMIN — CEFAZOLIN SODIUM 1000 MG: 1 SOLUTION INTRAVENOUS at 04:58

## 2020-01-22 RX ADMIN — METOPROLOL TARTRATE 50 MG: 50 TABLET, FILM COATED ORAL at 22:07

## 2020-01-22 RX ADMIN — INSULIN LISPRO 2 UNITS: 100 INJECTION, SOLUTION INTRAVENOUS; SUBCUTANEOUS at 17:34

## 2020-01-22 RX ADMIN — POTASSIUM CHLORIDE 20 MEQ: 1500 TABLET, EXTENDED RELEASE ORAL at 08:28

## 2020-01-22 RX ADMIN — APIXABAN 5 MG: 5 TABLET, FILM COATED ORAL at 08:27

## 2020-01-22 RX ADMIN — CEFAZOLIN SODIUM 1000 MG: 1 SOLUTION INTRAVENOUS at 17:35

## 2020-01-22 RX ADMIN — ALLOPURINOL 300 MG: 100 TABLET ORAL at 08:26

## 2020-01-22 RX ADMIN — GUAIFENESIN 600 MG: 600 TABLET ORAL at 08:30

## 2020-01-22 RX ADMIN — AMLODIPINE BESYLATE 10 MG: 10 TABLET ORAL at 08:27

## 2020-01-22 RX ADMIN — FERROUS GLUCONATE 324 MG: 324 TABLET ORAL at 06:04

## 2020-01-22 RX ADMIN — Medication 1 MG/HR: at 04:58

## 2020-01-22 RX ADMIN — INSULIN LISPRO 2 UNITS: 100 INJECTION, SOLUTION INTRAVENOUS; SUBCUTANEOUS at 12:31

## 2020-01-22 RX ADMIN — PRAVASTATIN SODIUM 40 MG: 40 TABLET ORAL at 17:35

## 2020-01-22 RX ADMIN — DOCUSATE SODIUM 100 MG: 100 CAPSULE, LIQUID FILLED ORAL at 17:35

## 2020-01-22 RX ADMIN — GUAIFENESIN 600 MG: 600 TABLET ORAL at 22:07

## 2020-01-22 RX ADMIN — INSULIN GLARGINE 22 UNITS: 100 INJECTION, SOLUTION SUBCUTANEOUS at 22:07

## 2020-01-22 RX ADMIN — CEFAZOLIN SODIUM 1000 MG: 1 SOLUTION INTRAVENOUS at 11:06

## 2020-01-22 RX ADMIN — MELATONIN TAB 3 MG 6 MG: 3 TAB at 22:07

## 2020-01-22 RX ADMIN — APIXABAN 5 MG: 5 TABLET, FILM COATED ORAL at 17:35

## 2020-01-22 RX ADMIN — TAMSULOSIN HYDROCHLORIDE 0.4 MG: 0.4 CAPSULE ORAL at 17:35

## 2020-01-22 NOTE — ASSESSMENT & PLAN NOTE
Lab Results   Component Value Date    HGBA1C 6 4 (H) 01/07/2020       Recent Labs     01/20/20 2055 01/21/20  0723 01/21/20  1106 01/21/20  1614   POCGLU 150* 111 102 246*       Blood Sugar Average: Last 72 hrs:  (P) 188 4 iddm on insulin degludec-liraglutide 18 iu daily  Will transition to lantus at 9 iu qhs and start ssi/poc bs given poor appetite

## 2020-01-22 NOTE — PLAN OF CARE
Problem: Potential for Falls  Goal: Patient will remain free of falls  Description  INTERVENTIONS:  - Assess patient frequently for physical needs  -  Identify cognitive and physical deficits and behaviors that affect risk of falls    -  [place] fall precautions as indicated by assessment   - Educate patient/family on patient safety including physical limitations  - Instruct patient to call for assistance with activity based on assessment  - Modify environment to reduce risk of injury  - Consider OT/PT consult to assist with strengthening/mobility  Outcome: Progressing     Problem: PAIN - ADULT  Goal: Verbalizes/displays adequate comfort level or baseline comfort level  Description  Interventions:  - Encourage patient to monitor pain and request assistance  - Assess pain using appropriate pain scale  - Administer analgesics based on type and severity of pain and evaluate response  - Implement non-pharmacological measures as appropriate and evaluate response  - Consider cultural and social influences on pain and pain management  - Notify physician/advanced practitioner if interventions unsuccessful or patient reports new pain  Outcome: Progressing     Problem: INFECTION - ADULT  Goal: Absence or prevention of progression during hospitalization  Description  INTERVENTIONS:  - Assess and monitor for signs and symptoms of infection  - Monitor lab/diagnostic results  - Monitor all insertion sites, i e  indwelling lines, tubes, and drains  - Administer medications as ordered  - Instruct and encourage patient and family to use good hand hygiene technique  - Identify and instruct in appropriate isolation precautions for identified infection/condition   Outcome: Progressing     Problem: SAFETY ADULT  Goal: Maintain or return to baseline ADL function  Description  INTERVENTIONS:  -  Assess patient's ability to carry out ADLs; assess patient's baseline for ADL function and identify physical deficits which impact ability to perform ADLs (bathing, care of mouth/teeth, toileting, grooming, dressing, etc )  - Assess/evaluate cause of self-care deficits   - Assess range of motion  - Assess patient's mobility; develop plan if impaired  - Assess patient's need for assistive devices and provide as appropriate  - Encourage maximum independence but intervene and supervise when necessary  - Involve family in performance of ADLs  - Assess for home care needs following discharge   - Consider OT consult to assist with ADL evaluation and planning for discharge  - Provide patient education as appropriate  Outcome: Progressing  Goal: Maintain or return mobility status to optimal level  Description  INTERVENTIONS:  - Assess patient's baseline mobility status (ambulation, transfers, stairs, etc )    - Identify cognitive and physical deficits and behaviors that affect mobility  - Identify mobility aids required to assist with transfers and/or ambulation (gait belt, sit-to-stand, lift, walker, cane, etc )  - [place] fall precautions as indicated by assessment  - Record patient progress and toleration of activity level on Mobility SBAR; progress patient to next Phase/Stage  - Instruct patient to call for assistance with activity based on assessment  - Consider rehabilitation consult to assist with strengthening/weightbearing, etc   Outcome: Progressing     Problem: DISCHARGE PLANNING  Goal: Discharge to home or other facility with appropriate resources  Description  INTERVENTIONS:  - Identify barriers to discharge w/patient and caregiver  - Arrange for needed discharge resources and transportation as appropriate  - Identify discharge learning needs (meds, wound care, etc )  - Arrange for interpretive services to assist at discharge as needed  - Refer to Case Management Department for coordinating discharge planning if the patient needs post-hospital services based on physician/advanced practitioner order or complex needs related to functional status, cognitive ability, or social support system  Outcome: Progressing     Problem: Knowledge Deficit  Goal: Patient/family/caregiver demonstrates understanding of disease process, treatment plan, medications, and discharge instructions  Description  Complete learning assessment and assess knowledge base    Interventions:  - Provide teaching at level of understanding  - Provide teaching via preferred learning methods  Outcome: Progressing     Problem: CARDIOVASCULAR - ADULT  Goal: Maintains optimal cardiac output and hemodynamic stability  Description  INTERVENTIONS:  - Monitor I/O, vital signs and rhythm  - Monitor for S/S and trends of decreased cardiac output  - Administer and titrate ordered vasoactive medications to optimize hemodynamic stability  - Assess quality of pulses, skin color and temperature  - Assess for signs of decreased coronary artery perfusion  - Instruct patient to report change in severity of symptoms  Outcome: Progressing  Goal: Absence of cardiac dysrhythmias or at baseline rhythm  Description  INTERVENTIONS:  - Continuous cardiac monitoring, vital signs, obtain 12 lead EKG if ordered  - Administer antiarrhythmic and heart rate control medications as ordered  - Monitor electrolytes and administer replacement therapy as ordered  Outcome: Progressing     Problem: RESPIRATORY - ADULT  Goal: Achieves optimal ventilation and oxygenation  Description  INTERVENTIONS:  - Assess for changes in respiratory status  - Assess for changes in mentation and behavior  - Position to facilitate oxygenation and minimize respiratory effort  - Oxygen administered by appropriate delivery if ordered  - Initiate smoking cessation education as indicated  - Encourage broncho-pulmonary hygiene including cough, deep breathe, Incentive Spirometry  - Assess the need for suctioning and aspirate as needed  - Assess and instruct to report SOB or any respiratory difficulty  - Respiratory Therapy support as indicated  Outcome: Progressing     Problem: Prexisting or High Potential for Compromised Skin Integrity  Goal: Skin integrity is maintained or improved  Description  INTERVENTIONS:  - Identify patients at risk for skin breakdown  - Assess and monitor skin integrity  - Assess and monitor nutrition and hydration status  - Monitor labs   - Assess for incontinence   - Turn and reposition patient  - Assist with mobility/ambulation  - Relieve pressure over bony prominences  - Avoid friction and shearing  - Provide appropriate hygiene as needed including keeping skin clean and dry  - Evaluate need for skin moisturizer/barrier cream  - Collaborate with interdisciplinary team   - Patient/family teaching  - Consider wound care consult   Outcome: Progressing     Problem: Nutrition/Hydration-ADULT  Goal: Nutrient/Hydration intake appropriate for improving, restoring or maintaining nutritional needs  Description  Monitor and assess patient's nutrition/hydration status for malnutrition  Collaborate with interdisciplinary team and initiate plan and interventions as ordered  Monitor patient's weight and dietary intake as ordered or per policy  Utilize nutrition screening tool and intervene as necessary  Determine patient's food preferences and provide high-protein, high-caloric foods as appropriate       INTERVENTIONS:  - Monitor oral intake, urinary output, labs, and treatment plans  - Assess nutrition and hydration status and recommend course of action  - Evaluate amount of meals eaten  - Assist patient with eating if necessary   - Allow adequate time for meals  - Recommend/ encourage appropriate diets, oral nutritional supplements, and vitamin/mineral supplements  - Order, calculate, and assess calorie counts as needed  - Recommend, monitor, and adjust tube feedings and TPN/PPN based on assessed needs  - Assess need for intravenous fluids  - Provide specific nutrition/hydration education as appropriate  - Include patient/family/caregiver in decisions related to nutrition  Outcome: Progressing     Problem: METABOLIC, FLUID AND ELECTROLYTES - ADULT  Goal: Glucose maintained within target range  Description  INTERVENTIONS:  - Monitor Blood Glucose as ordered  - Assess for signs and symptoms of hyperglycemia and hypoglycemia  - Administer ordered medications to maintain glucose within target range  - Assess nutritional intake and initiate nutrition service referral as needed  Outcome: Progressing     Problem: SKIN/TISSUE INTEGRITY - ADULT  Goal: Skin integrity remains intact  Description  INTERVENTIONS  - Identify patients at risk for skin breakdown  - Assess and monitor skin integrity  - Assess and monitor nutrition and hydration status  - Monitor labs (i e  albumin)  - Assess for incontinence   - Turn and reposition patient  - Assist with mobility/ambulation  - Relieve pressure over bony prominences  - Avoid friction and shearing  - Provide appropriate hygiene as needed including keeping skin clean and dry  - Evaluate need for skin moisturizer/barrier cream  - Collaborate with interdisciplinary team (i e  Nutrition, Rehabilitation, etc )   - Patient/family teaching  Outcome: Progressing  Goal: Incision(s), wounds(s) or drain site(s) healing without S/S of infection  Description  INTERVENTIONS  - Assess and document risk factors for skin impairment   - Assess and document dressing, incision, wound bed, drain sites and surrounding tissue  - Consider nutrition services referral as needed  - Oral mucous membranes remain intact  - Provide patient/ family education  Outcome: Progressing  Goal: Oral mucous membranes remain intact  Description  INTERVENTIONS  - Assess oral mucosa and hygiene practices  - Implement preventative oral hygiene regimen  - Implement oral medicated treatments as ordered  - Initiate Nutrition services referral as needed  Outcome: Progressing

## 2020-01-22 NOTE — ASSESSMENT & PLAN NOTE
Lab Results   Component Value Date    HGBA1C 6 4 (H) 01/07/2020       Recent Labs     01/20/20 2055 01/21/20  0723 01/21/20  1106 01/21/20  1614   POCGLU 150* 111 102 246*       Blood Sugar Average: Last 72 hrs:  (P) 188 4     SSI and basal bolus insulin

## 2020-01-22 NOTE — PROGRESS NOTES
Progress Note - Bi Knee 1955, 75 y o  male MRN: [id number]    Unit/Bed#: E4 -01 Encounter: [id number]    Primary Care Provider: Azael Tamayo DO   Date and time admitted to hospital: 1/7/2020 11:29 AM        Hypokalemia  Assessment & Plan  Repletion by Nephrology    Anxiety  Assessment & Plan  Ativan PRN    Iron deficiency anemia  Assessment & Plan  S/P one unit of PRBC's - HB stable, repeat in the am    Type 2 diabetes mellitus with diabetic neuropathy, without long-term current use of insulin [facility])  Assessment & Plan  Lab Results   Component Value Date    HGBA1C 6 4 (H) 01/07/2020       Recent Labs     01/20/20  2055 01/21/20  0723 01/21/20  1106 01/21/20  1614   POCGLU 150* 111 102 246*       Blood Sugar Average: Last 72 hrs:  (P) 188 4     SSI and basal bolus insulin  Elevated troponin I level  Assessment & Plan  W/o cp  Suspect type 2 MI from hypoxia vs nonspecific troponin elevation w/underlying CHF and ELZBIETA  ekg stable and paced  Monitor troponins for completeness will consult cardiology  Chronic diastolic (congestive) heart failure (HCC)  Assessment & Plan  Wt Readings from Last 3 Encounters:   01/20/20 102 kg (225 lb 5 oz)   12/13/19 96 2 kg (212 lb)   12/03/19 97 5 kg (215 lb)       Now with acute exacerbation - On Bumex drip    Repeat labs in the am    CXR with volume overload - Repeat labs in the am  ELBERT w/LVEF 60% but moderate to severe mitral stenosis    Appreciate cardiology recommendations      Anemia  Assessment & Plan  - Hemoccult stools  Tranfuse for HB less than 7   No evidence of melena or blood loss    Acute renal failure superimposed on stage 3 chronic kidney disease ([facility] [address] )  Assessment & Plan  Suspected elzbieta on ckd  ELZBIETA may be due to illness and poor oral intake    Nephrology consultation  Placed - Clinically volume overloaded       Creatine improved to 2 87    Essential hypertension  Assessment & Plan  Hold cozaar/ continue metoprolol      Type 2 diabetes mellitus with chronic kidney disease, without long-term current use of insulin [facility])  Assessment & Plan  Lab Results   Component Value Date    HGBA1C 6 4 (H) 01/07/2020       Recent Labs     01/20/20 2055 01/21/20  0723 01/21/20  1106 01/21/20  1614   POCGLU 150* 111 102 246*       Blood Sugar Average: Last 72 hrs:  (P) 188 4 iddm on insulin degludec-liraglutide 18 iu daily  Will transition to lantus at 9 iu qhs and start ssi/poc bs given poor appetite    NICOLASA (obstructive sleep apnea)  Assessment & Plan  Continue cpap qhs    PAF (paroxysmal atrial fibrillation) (HCC)  Assessment & Plan  Ventricular Rate controlled and paced rhythm  Continue lopressor/eliquis    Severe sepsis [facility])  Assessment & Plan  Patient with MSSA Bacteremia from left hallux wound  He remains on Ancef, he has received a PICC line  He did have a ELBERT that was negative for vegetation, he is status post left hallux amputation on 01/08/20/2020 as well as incision and drainage removal of the sesamoid bone on 01/12/2020  This appears to be the nidus of infection  Appreciate Podiatry recommendations  He will continue Ancef through to 01/20/2020  During this time he will need weekly CBCs in creatinine while on intravenous antibiotic    Complete heart block (HCC)  Assessment & Plan  S/p ppm  Monitored on telemetry    Eczema  Assessment & Plan  Stable discrete plaques over back w/silver scaling  Pruritic non painful  Continue op dupixent twice weekly upon d/c  Op f/u with derm  * Acute respiratory failure with hypoxia (HCC)  Assessment & Plan  Acute hypoxemic respiratory failure in the setting of acute on chronic diastolic congestive heart failure    Off Bipap today    CXR with pulmonary congestion - Continue bumex, wean O2 as tolerated         [address] Internal Medicine Progress Note  Patient: Jennifer Reardon 75 y o  male   MRN: [id number]  PCP: Dorothy Villatoro DO  Unit/Bed#: E4 -01 Encounter: [id number]  Date Of Visit: 01/21/20    Assessment:    Principal Problem:    Acute respiratory failure with hypoxia (HCC)  Active Problems:    Eczema    Complete heart block (HCC)    Severe sepsis (HCC)    PAF (paroxysmal atrial fibrillation) (HCC)    NICOLASA (obstructive sleep apnea)    Type 2 diabetes mellitus with chronic kidney disease, without long-term current use of insulin (HCC)    Essential hypertension    Acute renal failure superimposed on stage 3 chronic kidney disease (HCC)    Anemia    Chronic diastolic (congestive) heart failure (HCC)    Elevated troponin I level    Type 2 diabetes mellitus with diabetic neuropathy, without long-term current use of insulin (HCC)    Pyogenic inflammation of bone (HCC)    Iron deficiency anemia    Anxiety    Hypokalemia      Plan:    · Check ultrasound of the upper extremity due to left upper extremity swelling  · Recheck labs in the morning  · Hemoccult stool       VTE Pharmacologic Prophylaxis:   Pharmacologic: Apixaban (Eliquis)  Mechanical VTE Prophylaxis in Place: Yes    Patient Centered Rounds: I have performed bedside rounds with nursing staff today  Discussions with Specialists or Other Care Team Provider:  Cardiology    Education and Discussions with Family / Patient:  Patient    Time Spent for Care: 30 minutes  More than 50% of total time spent on counseling and coordination of care as described above  Current Length of Stay: 14 day(s)    Current Patient Status: Inpatient   Certification Statement: The patient will continue to require additional inpatient hospital stay due to acute kidney injury and volume overload    Discharge Plan / Estimated Discharge Date:  48-72 hours    Code Status: Level 1 - Full Code      Subjective:   Patient seen and examined, shortness of breath difficulty breathing improved  Currently off BiPAP    A complete and comprehensive 14 point organ system review has been performed and all other systems are negative other than stated above      Objective: Vitals:   Temp (24hrs), Av 5 °F (36 9 °C), Min:97 9 °F (36 6 °C), Max:98 8 °F (37 1 °C)    Temp:  [97 9 °F (36 6 °C)-98 8 °F (37 1 °C)] 97 9 °F (36 6 °C)  HR:  [65-74] 74  Resp:  [20] 20  BP: (137-180)/(64-72) 149/68  SpO2:  [90 %-98 %] 97 %  Body mass index is 32 62 kg/m²  Input and Output Summary (last 24 hours): Intake/Output Summary (Last 24 hours) at 2020 1922  Last data filed at 2020 0601  Gross per 24 hour   Intake 121 3 ml   Output 1350 ml   Net -1228 7 ml       Physical Exam:     General: well appearing, no acute distress  HEENT: atraumatic, PERRLA, moist mucosa, normal pharynx, normal tonsils and adenoids, normal tongue, no fluid in sinuses  Neck: Trachea midline, no carotid bruit, no masses  Respiratory:  Rales  Cardiovascular: RRR, no m/r/g, Normal S1 and S2  Abdomen: Soft, non-tender, non-distended, normal bowel sounds in all quadrants, no hepatosplenomegaly, no tympany  Rectal: deferred  Musculoskeletal: normal ROM in upper and lower extremities  Integumentary: warm, dry, and pink, with no rash, purpura, or petechia  Heme/Lymph: no lymphadenopathy, no bruises  Neurological: Cranial Nerves II-XII grossly intact, no tics, normal sensation to pressure and light touch  Psychiatric: cooperative with normal mood, affect, and cognition      Additional Data:     Labs:    Results from last 7 days   Lab Units 20  0542   WBC Thousand/uL 12 20*   HEMOGLOBIN g/dL 7 1*   HEMATOCRIT % 21 5*   PLATELETS Thousands/uL 394*   NEUTROS PCT % 73   LYMPHS PCT % 10*   MONOS PCT % 11   EOS PCT % 5     Results from last 7 days   Lab Units 20  0542   POTASSIUM mmol/L 3 4*   CHLORIDE mmol/L 98*   CO2 mmol/L 22   BUN mg/dL 118*   CREATININE mg/dL 2 87*   CALCIUM mg/dL 8 3           * I Have Reviewed All Lab Data Listed Above  * Additional Pertinent Lab Tests Reviewed:  All Labs Within Last 24 Hours Reviewed    Imaging:  Chest x-ray    Recent Cultures (last 7 days):           Last 24 Hours Medication List:     Current Facility-Administered Medications:  acetaminophen 650 mg Oral Q6H PRN Melissa Alonso DO    allopurinol 300 mg Oral QAM Nilam Neumann DPM    amLODIPine 10 mg Oral Daily [facility], MOE    apixaban 5 mg Oral BID Melissa Alonso,     bumetanide 1 mg/hr Intravenous Continuous Rujul Hinds, DO Last Rate: 1 mg/hr (01/21/20 1511)   cefazolin 1,000 mg Intravenous Q8H PAT Rich Last Rate: 1,000 mg (01/21/20 1705)   docusate sodium 100 mg Oral BID Melissa Alonso, DO    ferrous gluconate 324 mg Oral BID AC Melissa Alonso, DO    guaiFENesin 600 mg Oral BID Nilam Neumann, EDWINM    insulin glargine 22 Units Subcutaneous HS Melissa Alonso, DO    insulin lispro 2-12 Units Subcutaneous TID AC Nilam Neumann DPM    ipratropium-albuterol 3 mL Nebulization Q6H PRN Nilam Neumann, EDWINM    LORazepam 0 5 mg Oral Q8H PRN Melissa Alonso DO    melatonin 6 mg Oral HS Nilam Neumann, EDWINM    metoprolol tartrate 50 mg Oral Q12H [address] Nilam Neumann, LIZBETH    oxyCODONE 5 mg Oral Q4H PRN Nilam Neumann DPM    potassium chloride 20 mEq Oral BID Rujul Hinds, DO    pravastatin 40 mg Oral Daily With Abimael Arango DPM    tamsulosin 0 4 mg Oral Daily With Abimael Arango DPM         Today, Patient Was Seen By: Vicky Redman DO    ** Please Note: This note has been constructed using a voice recognition system   **

## 2020-01-22 NOTE — PROGRESS NOTES
Progress Note - Podiatry  Brandon Seo 75 y o  male MRN: [id number]  Unit/Bed#: E4 -01 Encounter: [id number]    Assessment:  1  S/p I&D with removal of sesamoid on 01/12/20 and s/p left hallux amputation on 01/08/20 due to underlying clinical OM, abscess, and possible infectious tenosynovitis   2  Sepsis - POA  3  Bacteremia likely secondary to #1  4  DM type 2    Plan:  - Left hallux amputation site overall appears stable with macerated proximal skin edges maceration  Dressed with betadine paint and DSD  Will continue to monitor    - NWB to LLE   - Continue elevation b/l lower legs    - PT/OT eval and treat   - continue abx as per ID recs, IV ancef through 2/6/20  - pt is stable from podiatric standpoint   - rest of care per primary service  - will update attending       Subjective/Objective   Chief Complaint:   Chief Complaint   Patient presents with    Fatigue     Pt with multiple complaints: sore throat, diarrhea, fatigue, fevers, tylenol at 0930  -ill contacts  Subjective: 75 y o  y/o male was seen and evaluated at bedside  Pt denies any acute events overnight  Blood pressure 158/66, pulse 77, temperature 98 5 °F (36 9 °C), temperature source Tympanic, resp  rate 22, weight 102 kg (225 lb 5 oz), SpO2 99 %  ,Body mass index is 32 62 kg/m²  Invasive Devices     Peripherally Inserted Central Catheter Line            PICC Line 89/28/09 Right Basilic 8 days          Drain            Urethral Catheter Non-latex 18 Fr  2 days                Physical Exam:   General: Alert, cooperative and no distress  Lungs: Non labored breathing  Heart: Positive S1, S2  Abdomen: Soft, non-tender  Extremity: Left hallux amputation incision site appears stable overall with intact sutures  There is mild maceration noted proximal incision site  No active drainage observed  No malodor noted  NVS and MSK at baseline  No calf tenderness noted  There is pitting edema noted b/l lower extremity         1/22        Lab, Imaging and other studies:   CBC:   Lab Results   Component Value Date    WBC 10 18 (H) 01/22/2020    HGB 7 5 (L) 01/22/2020    HCT 22 9 (L) 01/22/2020    MCV 90 01/22/2020     01/22/2020    MCH 29 3 01/22/2020    MCHC 32 8 01/22/2020    RDW 14 0 01/22/2020    MPV 9 0 01/22/2020    NRBC 0 01/22/2020   , CMP:   Lab Results   Component Value Date    SODIUM 131 (L) 01/22/2020    K 3 3 (L) 01/22/2020    CL 98 (L) 01/22/2020    CO2 23 01/22/2020     (H) 01/22/2020    CREATININE 2 75 (H) 01/22/2020    CALCIUM 8 4 01/22/2020    EGFR 23 01/22/2020       Imaging: I have personally reviewed pertinent films in PACS  EKG, Pathology, and Other Studies: I have personally reviewed pertinent reports

## 2020-01-22 NOTE — PLAN OF CARE
Problem: Potential for Falls  Goal: Patient will remain free of falls  Description  INTERVENTIONS:  - Assess patient frequently for physical needs  -  Identify cognitive and physical deficits and behaviors that affect risk of falls    -  [place] fall precautions as indicated by assessment   - Educate patient/family on patient safety including physical limitations  - Instruct patient to call for assistance with activity based on assessment  - Modify environment to reduce risk of injury  - Consider OT/PT consult to assist with strengthening/mobility  Outcome: Progressing     Problem: PAIN - ADULT  Goal: Verbalizes/displays adequate comfort level or baseline comfort level  Description  Interventions:  - Encourage patient to monitor pain and request assistance  - Assess pain using appropriate pain scale  - Administer analgesics based on type and severity of pain and evaluate response  - Implement non-pharmacological measures as appropriate and evaluate response  - Consider cultural and social influences on pain and pain management  - Notify physician/advanced practitioner if interventions unsuccessful or patient reports new pain  Outcome: Progressing     Problem: INFECTION - ADULT  Goal: Absence or prevention of progression during hospitalization  Description  INTERVENTIONS:  - Assess and monitor for signs and symptoms of infection  - Monitor lab/diagnostic results  - Monitor all insertion sites, i e  indwelling lines, tubes, and drains  - Administer medications as ordered  - Instruct and encourage patient and family to use good hand hygiene technique  - Identify and instruct in appropriate isolation precautions for identified infection/condition   Outcome: Progressing     Problem: SAFETY ADULT  Goal: Maintain or return to baseline ADL function  Description  INTERVENTIONS:  -  Assess patient's ability to carry out ADLs; assess patient's baseline for ADL function and identify physical deficits which impact ability to perform ADLs (bathing, care of mouth/teeth, toileting, grooming, dressing, etc )  - Assess/evaluate cause of self-care deficits   - Assess range of motion  - Assess patient's mobility; develop plan if impaired  - Assess patient's need for assistive devices and provide as appropriate  - Encourage maximum independence but intervene and supervise when necessary  - Involve family in performance of ADLs  - Assess for home care needs following discharge   - Consider OT consult to assist with ADL evaluation and planning for discharge  - Provide patient education as appropriate  Outcome: Progressing  Goal: Maintain or return mobility status to optimal level  Description  INTERVENTIONS:  - Assess patient's baseline mobility status (ambulation, transfers, stairs, etc )    - Identify cognitive and physical deficits and behaviors that affect mobility  - Identify mobility aids required to assist with transfers and/or ambulation (gait belt, sit-to-stand, lift, walker, cane, etc )  - [place] fall precautions as indicated by assessment  - Record patient progress and toleration of activity level on Mobility SBAR; progress patient to next Phase/Stage  - Instruct patient to call for assistance with activity based on assessment  - Consider rehabilitation consult to assist with strengthening/weightbearing, etc   Outcome: Progressing     Problem: DISCHARGE PLANNING  Goal: Discharge to home or other facility with appropriate resources  Description  INTERVENTIONS:  - Identify barriers to discharge w/patient and caregiver  - Arrange for needed discharge resources and transportation as appropriate  - Identify discharge learning needs (meds, wound care, etc )  - Arrange for interpretive services to assist at discharge as needed  - Refer to Case Management Department for coordinating discharge planning if the patient needs post-hospital services based on physician/advanced practitioner order or complex needs related to functional status, cognitive ability, or social support system  Outcome: Progressing     Problem: Knowledge Deficit  Goal: Patient/family/caregiver demonstrates understanding of disease process, treatment plan, medications, and discharge instructions  Description  Complete learning assessment and assess knowledge base    Interventions:  - Provide teaching at level of understanding  - Provide teaching via preferred learning methods  Outcome: Progressing     Problem: CARDIOVASCULAR - ADULT  Goal: Maintains optimal cardiac output and hemodynamic stability  Description  INTERVENTIONS:  - Monitor I/O, vital signs and rhythm  - Monitor for S/S and trends of decreased cardiac output  - Administer and titrate ordered vasoactive medications to optimize hemodynamic stability  - Assess quality of pulses, skin color and temperature  - Assess for signs of decreased coronary artery perfusion  - Instruct patient to report change in severity of symptoms  Outcome: Progressing  Goal: Absence of cardiac dysrhythmias or at baseline rhythm  Description  INTERVENTIONS:  - Continuous cardiac monitoring, vital signs, obtain 12 lead EKG if ordered  - Administer antiarrhythmic and heart rate control medications as ordered  - Monitor electrolytes and administer replacement therapy as ordered  Outcome: Progressing     Problem: RESPIRATORY - ADULT  Goal: Achieves optimal ventilation and oxygenation  Description  INTERVENTIONS:  - Assess for changes in respiratory status  - Assess for changes in mentation and behavior  - Position to facilitate oxygenation and minimize respiratory effort  - Oxygen administered by appropriate delivery if ordered  - Initiate smoking cessation education as indicated  - Encourage broncho-pulmonary hygiene including cough, deep breathe, Incentive Spirometry  - Assess the need for suctioning and aspirate as needed  - Assess and instruct to report SOB or any respiratory difficulty  - Respiratory Therapy support as indicated  Outcome: Progressing     Problem: Prexisting or High Potential for Compromised Skin Integrity  Goal: Skin integrity is maintained or improved  Description  INTERVENTIONS:  - Identify patients at risk for skin breakdown  - Assess and monitor skin integrity  - Assess and monitor nutrition and hydration status  - Monitor labs   - Assess for incontinence   - Turn and reposition patient  - Assist with mobility/ambulation  - Relieve pressure over bony prominences  - Avoid friction and shearing  - Provide appropriate hygiene as needed including keeping skin clean and dry  - Evaluate need for skin moisturizer/barrier cream  - Collaborate with interdisciplinary team   - Patient/family teaching  - Consider wound care consult   Outcome: Progressing     Problem: Nutrition/Hydration-ADULT  Goal: Nutrient/Hydration intake appropriate for improving, restoring or maintaining nutritional needs  Description  Monitor and assess patient's nutrition/hydration status for malnutrition  Collaborate with interdisciplinary team and initiate plan and interventions as ordered  Monitor patient's weight and dietary intake as ordered or per policy  Utilize nutrition screening tool and intervene as necessary  Determine patient's food preferences and provide high-protein, high-caloric foods as appropriate       INTERVENTIONS:  - Monitor oral intake, urinary output, labs, and treatment plans  - Assess nutrition and hydration status and recommend course of action  - Evaluate amount of meals eaten  - Assist patient with eating if necessary   - Allow adequate time for meals  - Recommend/ encourage appropriate diets, oral nutritional supplements, and vitamin/mineral supplements  - Order, calculate, and assess calorie counts as needed  - Recommend, monitor, and adjust tube feedings and TPN/PPN based on assessed needs  - Assess need for intravenous fluids  - Provide specific nutrition/hydration education as appropriate  - Include patient/family/caregiver in decisions related to nutrition  Outcome: Progressing     Problem: METABOLIC, FLUID AND ELECTROLYTES - ADULT  Goal: Glucose maintained within target range  Description  INTERVENTIONS:  - Monitor Blood Glucose as ordered  - Assess for signs and symptoms of hyperglycemia and hypoglycemia  - Administer ordered medications to maintain glucose within target range  - Assess nutritional intake and initiate nutrition service referral as needed  Outcome: Progressing     Problem: SKIN/TISSUE INTEGRITY - ADULT  Goal: Skin integrity remains intact  Description  INTERVENTIONS  - Identify patients at risk for skin breakdown  - Assess and monitor skin integrity  - Assess and monitor nutrition and hydration status  - Monitor labs (i e  albumin)  - Assess for incontinence   - Turn and reposition patient  - Assist with mobility/ambulation  - Relieve pressure over bony prominences  - Avoid friction and shearing  - Provide appropriate hygiene as needed including keeping skin clean and dry  - Evaluate need for skin moisturizer/barrier cream  - Collaborate with interdisciplinary team (i e  Nutrition, Rehabilitation, etc )   - Patient/family teaching  Outcome: Progressing  Goal: Incision(s), wounds(s) or drain site(s) healing without S/S of infection  Description  INTERVENTIONS  - Assess and document risk factors for skin impairment   - Assess and document dressing, incision, wound bed, drain sites and surrounding tissue  - Consider nutrition services referral as needed  - Oral mucous membranes remain intact  - Provide patient/ family education  Outcome: Progressing  Goal: Oral mucous membranes remain intact  Description  INTERVENTIONS  - Assess oral mucosa and hygiene practices  - Implement preventative oral hygiene regimen  - Implement oral medicated treatments as ordered  - Initiate Nutrition services referral as needed  Outcome: Progressing

## 2020-01-22 NOTE — ASSESSMENT & PLAN NOTE
Patient with MSSA Bacteremia from left hallux wound  He remains on Ancef, he has received a PICC line  He did have a ELBERT that was negative for vegetation, he is status post left hallux amputation on 01/08/20/2020 as well as incision and drainage removal of the sesamoid bone on 01/12/2020  This appears to be the nidus of infection  Appreciate Podiatry recommendations  He will continue Ancef through to 01/20/2020      During this time he will need weekly CBCs in creatinine while on intravenous antibiotic

## 2020-01-22 NOTE — PROGRESS NOTES
Progress Note - Nephrology   Nabor Barros 75 y o  male MRN: [id number]  Unit/Bed#: E4 -01 Encounter: [id number]      Assessment / Plan:  1  Acute kidney injury, POA, suspecting likely cardiorenal syndrome, could be component of obstructive uropathy given urinary retention +/- ATN  -ojeda in place  -Renal u/s shows echogenic kidneys  -UA with microscopy shows trace ketones, large blood, greater than 300 protein, 4-10 WBCs, 4-10 rbc's, moderate bacteria with 0-1 fine granular casts, 1% eosinophils noted   Granular casts are suggestive of ATN  -sCr plateaued in the high 2s with slight improvement today  -last sCr 2 75   and stable  No signs of uremia  No urgent RRT needs  -monitor renal indices on bumex gtt per cardiology   2  Hyponatremia, suspecting secondary to volume overload plus renal dysfunction - expect improvement on bumex gtt  Manuela low at 14  Urine osm 336, TSH, cortisol ok  Serum osm high at 311  Prior lipid panel ok    3  Hypokalemia-potassium 3 3, likely diuretic-induced, continue b i d  Standing potassium chloride supplementation, monitor potassium/Mag levels  4  Chronic kidney disease 3, previous baseline creatinine in the low 1s with associated nephrotic range proteinuria, follows with Dr Wood Chol outpatient  5  Volume overload - monitor daily weight, I/O on bumex gtt per cardiology  Might need to consider UF on HD if diuresis ineffective vs ATC metolazone in addition to bumex gtt  Thankfully, adequate UOP noted  6  MSSA bacteremia and sepsis d/t left hallux wound - on Ancef through 2/6 as per Infectious Disease  7  Urinary retention - with Ojeda catheter, recommend d/c and monitoring bladder scans today  Have d/w Dr Eben Gibbs of [facility]  8  HTN - BP acceptable for level of ELZBIETA on CKD   Continue amlodipine 10 mg p o  Daily, metoprolol 50 mg p o  Q 12 hours with hold parameters   Also on Flomax     9  Anemia, iron deficiency- Hgb 9 4-->7s and stable, on oral iron   Avoiding venofer in light of bacteremia  Monitor CBC  Transfuse prn Hgb < 7   Iron low at 33, ferritin 94, iron sat 11%, TIBC 314        Subjective:   He is feeling much better on Bumex drip 1 milligram/hour  He denies any chest pain or shortness of breath  Objective:     Vitals: Blood pressure 154/70, pulse 82, temperature 98 5 °F (36 9 °C), temperature source Tympanic, resp  rate 22, weight 102 kg (225 lb 5 oz), SpO2 98 %  ,Body mass index is 32 62 kg/m²  Temp (24hrs), Av 6 °F (37 °C), Min:98 °F (36 7 °C), Max:99 2 °F (37 3 °C)      Weight (last 2 days)     Date/Time   Weight    20 0600   102 (225 31)                Intake/Output Summary (Last 24 hours) at 2020 1514  Last data filed at 2020 0501  Gross per 24 hour   Intake 350 ml   Output 1800 ml   Net -1450 ml     I/O last 24 hours: In: 350 [P O :350]  Out: 1800 [Urine:1800]        Physical Exam:   Physical Exam   Constitutional: He appears well-developed and well-nourished  No distress  Sitting up in chair   HENT:   Head: Normocephalic and atraumatic  Mouth/Throat: No oropharyngeal exudate  Eyes: Right eye exhibits no discharge  Left eye exhibits no discharge  No scleral icterus  +eyeglasses   Neck: Normal range of motion  Neck supple  No thyromegaly present  Cardiovascular: Normal rate and regular rhythm  Murmur heard  Pulmonary/Chest: Effort normal and breath sounds normal  He has no wheezes  He has no rales  Abdominal: Soft  Bowel sounds are normal  He exhibits no distension  There is no tenderness  Genitourinary:   Genitourinary Comments: +ojeda   Musculoskeletal: Normal range of motion  He exhibits edema (Left > right, left foot wrapped)  Neurological: He is alert  awake   Skin: Skin is warm and dry  No rash noted  He is not diaphoretic  Psychiatric: He has a normal mood and affect  His behavior is normal    Vitals reviewed        Invasive Devices     Peripherally Inserted Central Catheter Line            PICC Line 20 Right Basilic 9 days          Drain            Urethral Catheter Non-latex 18 Fr  3 days                Medications:    Scheduled Meds:  Current Facility-Administered Medications:  acetaminophen 650 mg Oral Q6H PRN Melissa Ambron, DO    allopurinol 300 mg Oral QAM Addi Aliciach, DPM    amLODIPine 10 mg Oral Daily Su Bernard, MOE    apixaban 5 mg Oral BID Melissa Ambron, DO    bumetanide 1 mg/hr Intravenous Continuous Rujul Hinds, DO Last Rate: 1 mg/hr (01/22/20 0458)   cefazolin 1,000 mg Intravenous Q8H PAT Rich Last Rate: 1,000 mg (01/22/20 1106)   docusate sodium 100 mg Oral BID Melissa Ambron, DO    ferrous gluconate 324 mg Oral BID AC Melissa Ambron, DO    guaiFENesin 600 mg Oral BID Addi Mulch, DPM    insulin glargine 22 Units Subcutaneous HS Melissa Ambron, DO    insulin lispro 2-12 Units Subcutaneous TID AC Addi Mathews, DPM    ipratropium-albuterol 3 mL Nebulization Q6H PRN Addi Mulch, DPM    LORazepam 0 5 mg Oral Q8H PRN Melissa Ambron, DO    melatonin 6 mg Oral HS Addi Mulch, DPM    metoprolol tartrate 50 mg Oral Q12H [address] Addi Mulch, DPM    oxyCODONE 5 mg Oral Q4H PRN Addi Mulch, DPM    potassium chloride 20 mEq Oral BID Rujul Hinds, DO    pravastatin 40 mg Oral Daily With Kathi Doyle Cha DPM    tamsulosin 0 4 mg Oral Daily With Kathi Doyle Cha, DPM        PRN Meds:   acetaminophen    ipratropium-albuterol    LORazepam    oxyCODONE    Continuous Infusions:  bumetanide 1 mg/hr Last Rate: 1 mg/hr (01/22/20 0458)           LAB RESULTS:      Results from last 7 days   Lab Units 01/22/20  0505 01/21/20  0542 01/20/20  0617 01/20/20  0100 01/19/20  1825 01/19/20  1211 01/19/20  0439  01/17/20  0643 01/16/20  0453   WBC Thousand/uL 10 18* 12 20*  --   --   --   --  14 89*  --  14 88* 13 37*   HEMOGLOBIN g/dL 7 5* 7 1*  --   --   --   --  9 4*  --  8 0* 7 4*   HEMATOCRIT % 22 9* 21 5*  --   --   --   --  28 7*  --  24 9* 23 1*   PLATELETS Thousands/uL 379 394*  --   --   --   --  406*  --  477* 458*   NEUTROS PCT % 70 73  --   --   --   --   --   --   --   --    LYMPHS PCT % 10* 10*  --   --   --   --   --   --   --   --    MONOS PCT % 13* 11  --   --   --   --   --   --   --   --    EOS PCT % 6 5  --   --   --   --   --   --   --   --    POTASSIUM mmol/L 3 3* 3 4* 3 6 3 7 3 7 3 7 3 7   < > 3 5 3 8   CHLORIDE mmol/L 98* 98* 95* 97* 96* 96* 95*   < > 100 101   CO2 mmol/L 23 22 21 22 23 24 23   < > 23 22   BUN mg/dL 122* 118* 110* 114* 110* 108* 102*   < > 87* 89*   CREATININE mg/dL 2 75* 2 87* 2 96* 2 98* 2 91* 2 90* 2 77*   < > 2 17* 2 00*   CALCIUM mg/dL 8 4 8 3 8 3 8 0* 8 1* 8 2* 8 3   < > 8 5 8 5   MAGNESIUM mg/dL 2 3  --   --   --   --   --   --   --   --   --     < > = values in this interval not displayed  CUTURES:  Lab Results   Component Value Date    BLOODCX No Growth After 5 Days  01/09/2020    BLOODCX No Growth After 5 Days  01/09/2020    BLOODCX Staphylococcus aureus (A) 01/07/2020    BLOODCX Staphylococcus aureus (A) 01/07/2020    BLOODCX No Growth After 5 Days  05/15/2019    BLOODCX No Growth After 5 Days  05/15/2019    BLOODCX No Growth After 5 Days  05/02/2019    BLOODCX No Growth After 5 Days  05/02/2019                 Portions of the record may have been created with voice recognition software  Occasional wrong word or "sound a like" substitutions may have occurred due to the inherent limitations of voice recognition software  Read the chart carefully and recognize, using context, where substitutions have occurred  If you have any questions, please contact the dictating provider

## 2020-01-22 NOTE — PROGRESS NOTES
Progress Note - Cardiology   Verlyn Boas 75 y o  male MRN: [id number]  Unit/Bed#: E4 -01 Encounter: [id number]      Assessment/Recommendations/Discussion:   1  Acute hypoxic respiratory failure  2  Acute on chronic diastolic CHF  3  Sepsis syndrome/MSSA bacteremia  4  Moderate AS, MS  5  CHB, PPM in place  6  HTN  7  Dyslipidemia  8  ELZBIETA on CKD  9  Non MI related troponin elevation secondary to sepsis/CHF/ELZBIETA/volume overload  10  DM  11  PAF, on Eliquis AC    PLAN   Still appears volume overloaded  Renal function is slightly improved   Creatinine 2 75 today   Continue with Bumex drip at 1 milligram/hour   Replete electrolytes p r n   Eliquis for anticoagulation   Amlodipine for blood pressure   Vascular ultrasound for left upper extremity swelling being performed currently        Subjective:   HPI  Doing well  Less short of breath, right lower extremity edema is markedly improved, still has some left lower extremity edema, still on oxygen 3 L with O2 sat 94%      Review of Systems: As noted in HPI  Rest of ROS is negative  Vitals:   /66 (BP Location: Left arm)   Pulse 77   Temp 98 5 °F (36 9 °C) (Tympanic)   Resp 22   Wt 102 kg (225 lb 5 oz)   SpO2 99%   BMI 32 62 kg/m²   Vitals:    01/19/20 0600 01/20/20 0600   Weight: 103 kg (226 lb 3 1 oz) 102 kg (225 lb 5 oz)       Intake/Output Summary (Last 24 hours) at 1/22/2020 1037  Last data filed at 1/22/2020 0501  Gross per 24 hour   Intake 350 ml   Output 1800 ml   Net -1450 ml       Physical Exam:  General appearance: alert and oriented, in no acute distress  Head: Normocephalic, without obvious abnormality, atraumatic  Eyes: conjunctivae/corneas clear  Anicteric    Neck: no adenopathy, no carotid bruit, no JVD  Lungs: clear to auscultation bilaterally  Heart: regular rate and rhythm, S1, S2 normal, no murmur, no click, rub or gallop  Abdomen: soft, non-tender; bowel sounds normal; no masses,  no organomegaly  Extremities:  Right lower extremity edema has resolved, left lower extremity edema still present at least 2+ pitting edema  Skin: Skin color, texture, turgor normal  No rashes or lesions     Lab Results:  Results from last 7 days   Lab Units 01/22/20  0505   WBC Thousand/uL 10 18*   HEMOGLOBIN g/dL 7 5*   HEMATOCRIT % 22 9*   PLATELETS Thousands/uL 379     Results from last 7 days   Lab Units 01/22/20  0505   POTASSIUM mmol/L 3 3*   CHLORIDE mmol/L 98*   CO2 mmol/L 23   BUN mg/dL 122*   CREATININE mg/dL 2 75*   CALCIUM mg/dL 8 4     Results from last 7 days   Lab Units 01/22/20  0505   POTASSIUM mmol/L 3 3*   CHLORIDE mmol/L 98*   CO2 mmol/L 23   BUN mg/dL 122*   CREATININE mg/dL 2 75*   CALCIUM mg/dL 8 4           Medications:    Current Facility-Administered Medications:     acetaminophen (TYLENOL) tablet 650 mg, 650 mg, Oral, Q6H PRN, Melissa Ambron, DO, 650 mg at 01/18/20 1215    allopurinol (ZYLOPRIM) tablet 300 mg, 300 mg, Oral, QAM, Delorise Dial, DPM, 300 mg at 01/22/20 0826    amLODIPine (NORVASC) tablet 10 mg, 10 mg, Oral, Daily, [facility], [facility], 10 mg at 01/22/20 0827    apixaban (ELIQUIS) tablet 5 mg, 5 mg, Oral, BID, Melissa Ambron, DO, 5 mg at 01/22/20 0827    bumetanide (BUMEX) 12 5 mg infusion 50 mL, 1 mg/hr, Intravenous, Continuous, Bean Hinds DO, Last Rate: 4 mL/hr at 01/22/20 0458, 1 mg/hr at 01/22/20 0458    ceFAZolin (ANCEF) IVPB (premix) 1,000 mg, 1,000 mg, Intravenous, Q8H, PAT Rich, Last Rate: 100 mL/hr at 01/22/20 0458, 1,000 mg at 01/22/20 0458    docusate sodium (COLACE) capsule 100 mg, 100 mg, Oral, BID, Melissa Ambron, DO, 100 mg at 01/22/20 0831    ferrous gluconate (FERGON) tablet 324 mg, 324 mg, Oral, BID AC, Melissa Alonso DO, 324 mg at 01/22/20 0604    guaiFENesin (MUCINEX) 12 hr tablet 600 mg, 600 mg, Oral, BID, Delorise Dial, DPM, 600 mg at 01/22/20 0830    insulin glargine (LANTUS) subcutaneous injection 22 Units 0 22 mL, 22 Units, Subcutaneous, HS, Melissa Alonso DO, 22 Units at 01/21/20 2116    insulin lispro (HumaLOG) 100 units/mL subcutaneous injection 2-12 Units, 2-12 Units, Subcutaneous, TID AC, 4 Units at 01/21/20 1706 **AND** Fingerstick Glucose (POCT), , , TID AC, Larissa Stokes, DPFELTON    ipratropium-albuterol (DUO-NEB) 0 5-2 5 mg/3 mL inhalation solution 3 mL, 3 mL, Nebulization, Q6H PRN, Larissa Stokes DPFELTON, 3 mL at 01/19/20 1243    LORazepam (ATIVAN) tablet 0 5 mg, 0 5 mg, Oral, Q8H PRN, Melissa Ambron, DO, 0 5 mg at 01/21/20 2121    melatonin tablet 6 mg, 6 mg, Oral, HS, Larissa Stokes, DPM, 6 mg at 01/21/20 2116    metoprolol tartrate (LOPRESSOR) tablet 50 mg, 50 mg, Oral, Q12H [address], Larissa Stokes DPFELTON, 50 mg at 01/22/20 0830    oxyCODONE (ROXICODONE) IR tablet 5 mg, 5 mg, Oral, Q4H PRN, Larissa Stokes DPFELTON, 5 mg at 01/12/20 1709    potassium chloride (K-DUR,KLOR-CON) CR tablet 20 mEq, 20 mEq, Oral, BID, Rujul HindsDO laura, 20 mEq at 01/22/20 5704    pravastatin (PRAVACHOL) tablet 40 mg, 40 mg, Oral, Daily With Teetee Crawley DPM, 40 mg at 01/21/20 1706    tamsulosin (FLOMAX) capsule 0 4 mg, 0 4 mg, Oral, Daily With Teetee Crawley DPM, 0 4 mg at 01/21/20 1706    This note was completed in part utilizing M-Modal Fluency Direct Software  Grammatical errors, random word insertions, spelling mistakes, and incomplete sentences may be an occasional consequence of this system secondary to software limitations, ambient noise, and hardware issues  If you have any questions or concerns about the content, text, or information contained within the body of this dictation, please contact the provider for clarification      Kelly Hinton DO, [facility]  1/22/2020 10:37 AM

## 2020-01-22 NOTE — ASSESSMENT & PLAN NOTE
Suspected elzbieta on ckd  ELZBIETA may be due to illness and poor oral intake    Nephrology consultation  Placed - Clinically volume overloaded       Creatine improved to 2 87

## 2020-01-22 NOTE — PLAN OF CARE
Problem: Potential for Falls  Goal: Patient will remain free of falls  Description  INTERVENTIONS:  - Assess patient frequently for physical needs  -  Identify cognitive and physical deficits and behaviors that affect risk of falls    -  [place] fall precautions as indicated by assessment   - Educate patient/family on patient safety including physical limitations  - Instruct patient to call for assistance with activity based on assessment  - Modify environment to reduce risk of injury  - Consider OT/PT consult to assist with strengthening/mobility  Outcome: Progressing     Problem: PAIN - ADULT  Goal: Verbalizes/displays adequate comfort level or baseline comfort level  Description  Interventions:  - Encourage patient to monitor pain and request assistance  - Assess pain using appropriate pain scale  - Administer analgesics based on type and severity of pain and evaluate response  - Implement non-pharmacological measures as appropriate and evaluate response  - Consider cultural and social influences on pain and pain management  - Notify physician/advanced practitioner if interventions unsuccessful or patient reports new pain  Outcome: Progressing     Problem: INFECTION - ADULT  Goal: Absence or prevention of progression during hospitalization  Description  INTERVENTIONS:  - Assess and monitor for signs and symptoms of infection  - Monitor lab/diagnostic results  - Monitor all insertion sites, i e  indwelling lines, tubes, and drains  - Administer medications as ordered  - Instruct and encourage patient and family to use good hand hygiene technique  - Identify and instruct in appropriate isolation precautions for identified infection/condition   Outcome: Progressing     Problem: SAFETY ADULT  Goal: Maintain or return to baseline ADL function  Description  INTERVENTIONS:  -  Assess patient's ability to carry out ADLs; assess patient's baseline for ADL function and identify physical deficits which impact ability to perform ADLs (bathing, care of mouth/teeth, toileting, grooming, dressing, etc )  - Assess/evaluate cause of self-care deficits   - Assess range of motion  - Assess patient's mobility; develop plan if impaired  - Assess patient's need for assistive devices and provide as appropriate  - Encourage maximum independence but intervene and supervise when necessary  - Involve family in performance of ADLs  - Assess for home care needs following discharge   - Consider OT consult to assist with ADL evaluation and planning for discharge  - Provide patient education as appropriate  Outcome: Progressing  Goal: Maintain or return mobility status to optimal level  Description  INTERVENTIONS:  - Assess patient's baseline mobility status (ambulation, transfers, stairs, etc )    - Identify cognitive and physical deficits and behaviors that affect mobility  - Identify mobility aids required to assist with transfers and/or ambulation (gait belt, sit-to-stand, lift, walker, cane, etc )  - [place] fall precautions as indicated by assessment  - Record patient progress and toleration of activity level on Mobility SBAR; progress patient to next Phase/Stage  - Instruct patient to call for assistance with activity based on assessment  - Consider rehabilitation consult to assist with strengthening/weightbearing, etc   Outcome: Progressing     Problem: DISCHARGE PLANNING  Goal: Discharge to home or other facility with appropriate resources  Description  INTERVENTIONS:  - Identify barriers to discharge w/patient and caregiver  - Arrange for needed discharge resources and transportation as appropriate  - Identify discharge learning needs (meds, wound care, etc )  - Arrange for interpretive services to assist at discharge as needed  - Refer to Case Management Department for coordinating discharge planning if the patient needs post-hospital services based on physician/advanced practitioner order or complex needs related to functional status, cognitive ability, or social support system  Outcome: Progressing     Problem: Knowledge Deficit  Goal: Patient/family/caregiver demonstrates understanding of disease process, treatment plan, medications, and discharge instructions  Description  Complete learning assessment and assess knowledge base  Interventions:  - Provide teaching at level of understanding  - Provide teaching via preferred learning methods  Outcome: Progressing     Problem: CARDIOVASCULAR - ADULT  Goal: Absence of cardiac dysrhythmias or at baseline rhythm  Description  INTERVENTIONS:  - Continuous cardiac monitoring, vital signs, obtain 12 lead EKG if ordered  - Administer antiarrhythmic and heart rate control medications as ordered  - Monitor electrolytes and administer replacement therapy as ordered  Outcome: Progressing     Problem: Prexisting or High Potential for Compromised Skin Integrity  Goal: Skin integrity is maintained or improved  Description  INTERVENTIONS:  - Identify patients at risk for skin breakdown  - Assess and monitor skin integrity  - Assess and monitor nutrition and hydration status  - Monitor labs   - Assess for incontinence   - Turn and reposition patient  - Assist with mobility/ambulation  - Relieve pressure over bony prominences  - Avoid friction and shearing  - Provide appropriate hygiene as needed including keeping skin clean and dry  - Evaluate need for skin moisturizer/barrier cream  - Collaborate with interdisciplinary team   - Patient/family teaching  - Consider wound care consult   Outcome: Progressing     Problem: Nutrition/Hydration-ADULT  Goal: Nutrient/Hydration intake appropriate for improving, restoring or maintaining nutritional needs  Description  Monitor and assess patient's nutrition/hydration status for malnutrition  Collaborate with interdisciplinary team and initiate plan and interventions as ordered  Monitor patient's weight and dietary intake as ordered or per policy   Utilize nutrition screening tool and intervene as necessary  Determine patient's food preferences and provide high-protein, high-caloric foods as appropriate       INTERVENTIONS:  - Monitor oral intake, urinary output, labs, and treatment plans  - Assess nutrition and hydration status and recommend course of action  - Evaluate amount of meals eaten  - Assist patient with eating if necessary   - Allow adequate time for meals  - Recommend/ encourage appropriate diets, oral nutritional supplements, and vitamin/mineral supplements  - Order, calculate, and assess calorie counts as needed  - Recommend, monitor, and adjust tube feedings and TPN/PPN based on assessed needs  - Assess need for intravenous fluids  - Provide specific nutrition/hydration education as appropriate  - Include patient/family/caregiver in decisions related to nutrition  Outcome: Progressing     Problem: SKIN/TISSUE INTEGRITY - ADULT  Goal: Skin integrity remains intact  Description  INTERVENTIONS  - Identify patients at risk for skin breakdown  - Assess and monitor skin integrity  - Assess and monitor nutrition and hydration status  - Monitor labs (i e  albumin)  - Assess for incontinence   - Turn and reposition patient  - Assist with mobility/ambulation  - Relieve pressure over bony prominences  - Avoid friction and shearing  - Provide appropriate hygiene as needed including keeping skin clean and dry  - Evaluate need for skin moisturizer/barrier cream  - Collaborate with interdisciplinary team (i e  Nutrition, Rehabilitation, etc )   - Patient/family teaching  Outcome: Progressing  Goal: Incision(s), wounds(s) or drain site(s) healing without S/S of infection  Description  INTERVENTIONS  - Assess and document risk factors for skin impairment   - Assess and document dressing, incision, wound bed, drain sites and surrounding tissue  - Consider nutrition services referral as needed  - Oral mucous membranes remain intact  - Provide patient/ family education  Outcome: Progressing  Goal: Oral mucous membranes remain intact  Description  INTERVENTIONS  - Assess oral mucosa and hygiene practices  - Implement preventative oral hygiene regimen  - Implement oral medicated treatments as ordered  - Initiate Nutrition services referral as needed  Outcome: Progressing

## 2020-01-22 NOTE — ASSESSMENT & PLAN NOTE
Acute hypoxemic respiratory failure in the setting of acute on chronic diastolic congestive heart failure    Off Bipap today    CXR with pulmonary congestion - Continue bumex, wean O2 as tolerated

## 2020-01-22 NOTE — PROGRESS NOTES
Progress Note - Infectious Disease   Satya Turpin 75 y o  male MRN: [id number]  Unit/Bed#: E4 -01 Encounter: [id number]      Impression/Plan:  1  Sepsis   POA   Fever and leukocytosis   Likely secondary to MSSA bacteremia with left hallux wound and underlying osteopmyelitis as presumed source  No other clear source appreciated  Despite being systemically ill the patient has been hemodynamically stable  Repeat blood cultures were negative after 5 days  TTE and ELBERT were negative for valvlular vegetation and pacemaker lead abnormalities  Patient now with evidence of CHF on chest xray  He is on BiPAP support at night and is on a Bumex drip   -antibiotic as below  -monitor CBC and BMP  -monitor vitals  -supportive care     2  Márquez Rota in both sets of patient's initial blood cultures  Suspect patient's left great toe ulcer was source   Patient does have an implanted pacemaker  TTE and ELBERT were negative  Repeat blood cultures were clear after 5 days  Patient is currently receiving IV cefazolin and is tolerating without difficulty  He will complete 4 weeks of IV antibiotic treatment, through 2/6/2020   -continue IV cefazolin, dosed for renal function, through 2/6/2020 for 4 weeks of antibiotic treatment  -remove PICC after patient's final dose of IV antibiotics on 2/6/2020  -weekly CBCD and BMP while on IV antibiotics  -monitor vitals  -follow up in the outpatient ID office on 1/28/2020 at [address] [place] [address] with Saritha ROSA  -antibiotic and lab scripts dropped off in patient chart on 1/15/2020      3  Left hallux ulceration   With osteomyelitis as his wound does probe to bone and an x-ray of the left foot was suggestive of bony erosion at the 1st proximal phalanx   Purulence noted during initial podiatry wound exam, concern for possible infectious tenosynovitis   I suspect this was the source of patient's bacteremia above  Geralynn Basket is now status post amputation of hallux for presumed surgical cure of the ulcer and osteomyelitis  Intraop wound cultures showed MSSA  He had additional I&D and removal and sesamoid bone on 1/12/2020    -serial left foot exams  -follow up intraop wound cultures  -local wound care per Podiatry  -continue close follow-up with Podiatry     4  Acute kidney injury  On CKD  Per patient's medical records it appears his baseline creatinine has been approximately 1  Consider secondary to volume overload  Also consider urinary retention, a ojeda is now in place  He is following closely with nephrology  He is now on Bumex drip   -check BMP tomorrow  -dose adjust antibiotics for renal function as needed  -continue close follow up with nephrology     5  Acute hypoxic respiratory failure   Suspect this is all secondary to pulmonary edema and diastolic heart failure   BNP and troponin were elevated upon admission  Initial chest x-ray and CT of the chest did not show consolidations indicative of a bacterial process  New chest imaging shows persistent CHF  Patient now requiring BiPAP support at night and is now on a Bumex drip   -diuresis per Cardiology/nephrology  -wean BiPAP support as tolerated per primary service  -monitor vitals  -monitor respiratory status  -continue close follow-up with cardiology  -continue close follow-up with nephrology     6  Type 2 diabetes mellitus with neuropathy   Patient's last hemoglobin A1c was 6 4% on 01/07/2020   This is risk factor for wounds and infection   Recommend tight glycemic control for overall health, especially in the setting of wound infection   -blood glucose management per primary service     7  Chronic diastolic heart failure  In setting of aortic and mitral valve stenosis and complete heart block   He has a [employer] dual chamber pacemaker in place   Now with Staph aureus bacteremia  TTE and ELBERT were negative  He is following closely with Cardiology   -continue follow-up with Cardiology     8  Paroxysmal AFib   On Eliquis  9  LUE swelling   Patient reports this appeared to start yesterday  He has no pain in the arm today  SLIM has ordered a LUE venous duplex study  -follow up LUE duplex    Above plan was discussed in detail with patient at the bedside  Antibiotics:  Cefazolin 13  Antibiotics 16    Subjective:  Patient reports he is doing well this morning  He feels that his breathing is much more normal it has been over the past few days  He is pleased to wear the BiPAP at night because he feels it is helping him  At this time he does not feel short of breath  He denies cough  He has no pain in his left foot today and states his dressing has remained clean, dry, and intact  He denies fever, chills, sweats, shakes; no nausea, vomiting, abdominal pain, diarrhea, or dysuria  No new symptoms  Objective:  Vitals:  Temp:  [97 9 °F (36 6 °C)-99 2 °F (37 3 °C)] 98 5 °F (36 9 °C)  HR:  [61-86] 77  Resp:  [20-22] 22  BP: (137-163)/(57-76) 158/66  SpO2:  [90 %-100 %] 99 %  Temp (24hrs), Av 5 °F (36 9 °C), Min:97 9 °F (36 6 °C), Max:99 2 °F (37 3 °C)  Current: Temperature: 98 5 °F (36 9 °C)    Physical Exam:   General Appearance:  Alert, interactive, nontoxic, no acute distress  Patient is wearing his glasses  Throat: Oropharynx moist without lesions  Lungs:   Clear but slightly decreased to auscultation bilaterally; no wheezes, rhonchi or rales; respirations unlabored; patient is on nasal cannula O2   Heart:  RRR; no murmur, rub or gallop   Abdomen:   Soft, non-tender, non-distended, positive bowel sounds  Genitourinary: Jones catheter intact, urine output is light yellow without sediment   Extremities: No clubbing or cyanosis; mild lower extremity edema; RUE PICC intact, no spreading erythema or leakage at site; LUE non erythematous, no pain with palpation; Left foot dressing is clean/dry/intact   Skin: No new rashes, lesions, or draining wounds noted on exposed skin       Labs, Imaging, & Other studies:   All pertinent labs and imaging studies were personally reviewed  Results from last 7 days   Lab Units 01/22/20  0505 01/21/20  0542 01/19/20  0439   WBC Thousand/uL 10 18* 12 20* 14 89*   HEMOGLOBIN g/dL 7 5* 7 1* 9 4*   PLATELETS Thousands/uL 379 394* 406*     Results from last 7 days   Lab Units 01/22/20  0505   POTASSIUM mmol/L 3 3*   CHLORIDE mmol/L 98*   CO2 mmol/L 23   BUN mg/dL 122*   CREATININE mg/dL 2 75*   EGFR ml/min/1 73sq m 23   CALCIUM mg/dL 8 4

## 2020-01-23 PROBLEM — E87.1 HYPONATREMIA: Status: RESOLVED | Noted: 2020-01-20 | Resolved: 2020-01-23

## 2020-01-23 LAB
ANION GAP SERPL CALCULATED.3IONS-SCNC: 11 MMOL/L (ref 4–13)
BASOPHILS # BLD AUTO: 0.11 THOUSANDS/ΜL (ref 0–0.1)
BASOPHILS NFR BLD AUTO: 1 % (ref 0–1)
BUN SERPL-MCNC: 128 MG/DL (ref 5–25)
CALCIUM SERPL-MCNC: 8.8 MG/DL (ref 8.3–10.1)
CHLORIDE SERPL-SCNC: 97 MMOL/L (ref 100–108)
CO2 SERPL-SCNC: 26 MMOL/L (ref 21–32)
CREAT SERPL-MCNC: 2.58 MG/DL (ref 0.6–1.3)
EOSINOPHIL # BLD AUTO: 0.69 THOUSAND/ΜL (ref 0–0.61)
EOSINOPHIL NFR BLD AUTO: 6 % (ref 0–6)
ERYTHROCYTE [DISTWIDTH] IN BLOOD BY AUTOMATED COUNT: 14.2 % (ref 11.6–15.1)
GFR SERPL CREATININE-BSD FRML MDRD: 25 ML/MIN/1.73SQ M
GLUCOSE SERPL-MCNC: 204 MG/DL (ref 65–140)
GLUCOSE SERPL-MCNC: 216 MG/DL (ref 65–140)
GLUCOSE SERPL-MCNC: 237 MG/DL (ref 65–140)
GLUCOSE SERPL-MCNC: 79 MG/DL (ref 65–140)
GLUCOSE SERPL-MCNC: 92 MG/DL (ref 65–140)
HCT VFR BLD AUTO: 24.8 % (ref 36.5–49.3)
HGB BLD-MCNC: 8.1 G/DL (ref 12–17)
IMM GRANULOCYTES # BLD AUTO: 0.06 THOUSAND/UL (ref 0–0.2)
IMM GRANULOCYTES NFR BLD AUTO: 1 % (ref 0–2)
LYMPHOCYTES # BLD AUTO: 1.42 THOUSANDS/ΜL (ref 0.6–4.47)
LYMPHOCYTES NFR BLD AUTO: 12 % (ref 14–44)
MCH RBC QN AUTO: 29.6 PG (ref 26.8–34.3)
MCHC RBC AUTO-ENTMCNC: 32.7 G/DL (ref 31.4–37.4)
MCV RBC AUTO: 91 FL (ref 82–98)
MONOCYTES # BLD AUTO: 1.43 THOUSAND/ΜL (ref 0.17–1.22)
MONOCYTES NFR BLD AUTO: 12 % (ref 4–12)
NEUTROPHILS # BLD AUTO: 7.88 THOUSANDS/ΜL (ref 1.85–7.62)
NEUTS SEG NFR BLD AUTO: 68 % (ref 43–75)
NRBC BLD AUTO-RTO: 0 /100 WBCS
PLATELET # BLD AUTO: 399 THOUSANDS/UL (ref 149–390)
PMV BLD AUTO: 9.2 FL (ref 8.9–12.7)
POTASSIUM SERPL-SCNC: 3.3 MMOL/L (ref 3.5–5.3)
RBC # BLD AUTO: 2.74 MILLION/UL (ref 3.88–5.62)
SODIUM SERPL-SCNC: 134 MMOL/L (ref 136–145)
WBC # BLD AUTO: 11.59 THOUSAND/UL (ref 4.31–10.16)

## 2020-01-23 PROCEDURE — 80048 BASIC METABOLIC PNL TOTAL CA: CPT | Performed by: INTERNAL MEDICINE

## 2020-01-23 PROCEDURE — 94760 N-INVAS EAR/PLS OXIMETRY 1: CPT

## 2020-01-23 PROCEDURE — 99232 SBSQ HOSP IP/OBS MODERATE 35: CPT | Performed by: INTERNAL MEDICINE

## 2020-01-23 PROCEDURE — 99232 SBSQ HOSP IP/OBS MODERATE 35: CPT

## 2020-01-23 PROCEDURE — 97116 GAIT TRAINING THERAPY: CPT

## 2020-01-23 PROCEDURE — 97530 THERAPEUTIC ACTIVITIES: CPT

## 2020-01-23 PROCEDURE — 85025 COMPLETE CBC W/AUTO DIFF WBC: CPT | Performed by: INTERNAL MEDICINE

## 2020-01-23 PROCEDURE — 82948 REAGENT STRIP/BLOOD GLUCOSE: CPT

## 2020-01-23 PROCEDURE — 97110 THERAPEUTIC EXERCISES: CPT

## 2020-01-23 PROCEDURE — 94660 CPAP INITIATION&MGMT: CPT

## 2020-01-23 PROCEDURE — 97535 SELF CARE MNGMENT TRAINING: CPT

## 2020-01-23 RX ORDER — CEFAZOLIN SODIUM 1 G/50ML
SOLUTION INTRAVENOUS
Status: DISPENSED
Start: 2020-01-23 | End: 2020-01-23

## 2020-01-23 RX ORDER — CEFAZOLIN SODIUM 2 G/50ML
SOLUTION INTRAVENOUS
Status: DISPENSED
Start: 2020-01-23 | End: 2020-01-23

## 2020-01-23 RX ORDER — POTASSIUM CHLORIDE 20 MEQ/1
40 TABLET, EXTENDED RELEASE ORAL ONCE
Status: COMPLETED | OUTPATIENT
Start: 2020-01-23 | End: 2020-01-23

## 2020-01-23 RX ADMIN — TAMSULOSIN HYDROCHLORIDE 0.4 MG: 0.4 CAPSULE ORAL at 18:02

## 2020-01-23 RX ADMIN — METOPROLOL TARTRATE 50 MG: 50 TABLET, FILM COATED ORAL at 08:18

## 2020-01-23 RX ADMIN — APIXABAN 5 MG: 5 TABLET, FILM COATED ORAL at 18:01

## 2020-01-23 RX ADMIN — APIXABAN 5 MG: 5 TABLET, FILM COATED ORAL at 08:18

## 2020-01-23 RX ADMIN — INSULIN GLARGINE 22 UNITS: 100 INJECTION, SOLUTION SUBCUTANEOUS at 21:26

## 2020-01-23 RX ADMIN — AMLODIPINE BESYLATE 10 MG: 10 TABLET ORAL at 08:18

## 2020-01-23 RX ADMIN — Medication 1 MG/HR: at 08:23

## 2020-01-23 RX ADMIN — POTASSIUM CHLORIDE 20 MEQ: 1500 TABLET, EXTENDED RELEASE ORAL at 08:26

## 2020-01-23 RX ADMIN — GUAIFENESIN 600 MG: 600 TABLET ORAL at 08:18

## 2020-01-23 RX ADMIN — DOCUSATE SODIUM 100 MG: 100 CAPSULE, LIQUID FILLED ORAL at 18:01

## 2020-01-23 RX ADMIN — POTASSIUM CHLORIDE 40 MEQ: 1500 TABLET, EXTENDED RELEASE ORAL at 13:43

## 2020-01-23 RX ADMIN — ALLOPURINOL 300 MG: 100 TABLET ORAL at 08:18

## 2020-01-23 RX ADMIN — DOCUSATE SODIUM 100 MG: 100 CAPSULE, LIQUID FILLED ORAL at 08:19

## 2020-01-23 RX ADMIN — CEFAZOLIN SODIUM 1000 MG: 1 SOLUTION INTRAVENOUS at 03:11

## 2020-01-23 RX ADMIN — INSULIN LISPRO 4 UNITS: 100 INJECTION, SOLUTION INTRAVENOUS; SUBCUTANEOUS at 12:04

## 2020-01-23 RX ADMIN — GUAIFENESIN 600 MG: 600 TABLET ORAL at 21:26

## 2020-01-23 RX ADMIN — METOPROLOL TARTRATE 50 MG: 50 TABLET, FILM COATED ORAL at 21:25

## 2020-01-23 RX ADMIN — MELATONIN TAB 3 MG 6 MG: 3 TAB at 21:25

## 2020-01-23 RX ADMIN — INSULIN LISPRO 4 UNITS: 100 INJECTION, SOLUTION INTRAVENOUS; SUBCUTANEOUS at 18:00

## 2020-01-23 RX ADMIN — POTASSIUM CHLORIDE 20 MEQ: 1500 TABLET, EXTENDED RELEASE ORAL at 18:01

## 2020-01-23 RX ADMIN — FERROUS GLUCONATE 324 MG: 324 TABLET ORAL at 06:08

## 2020-01-23 RX ADMIN — CEFAZOLIN SODIUM 1000 MG: 1 SOLUTION INTRAVENOUS at 18:03

## 2020-01-23 RX ADMIN — CEFAZOLIN SODIUM 1000 MG: 1 SOLUTION INTRAVENOUS at 10:40

## 2020-01-23 RX ADMIN — PRAVASTATIN SODIUM 40 MG: 40 TABLET ORAL at 18:01

## 2020-01-23 RX ADMIN — LORAZEPAM 0.5 MG: 1 TABLET ORAL at 08:26

## 2020-01-23 NOTE — ASSESSMENT & PLAN NOTE
Lab Results   Component Value Date    HGBA1C 6 4 (H) 01/07/2020       Recent Labs     01/22/20  0836 01/22/20  0855 01/22/20  1129 01/22/20  1618   POCGLU 170* 201* 177* 190*       Blood Sugar Average: Last 72 hrs:  (P) 208 [id number] iddm on insulin degludec-liraglutide 18 iu daily  Will transition to lantus at 9 iu qhs and start ssi/poc bs given poor appetite

## 2020-01-23 NOTE — ASSESSMENT & PLAN NOTE
- Hemoccult stools  Tranfuse for HB less than 7   No evidence of melena or blood loss   Repeat in am

## 2020-01-23 NOTE — PROGRESS NOTES
Progress Note - Fco Zaldivar 1955, 75 y o  male MRN: [id number]    Unit/Bed#: E4 -01 Encounter: [id number]    Primary Care Provider: Urbano Hatchet, DO   Date and time admitted to hospital: 1/7/2020 11:29 AM        Hypokalemia  Assessment & Plan  Repletion by Nephrology  Anxiety  Assessment & Plan  Ativan PRN    Iron deficiency anemia  Assessment & Plan  S/P one unit of PRBC's - HB stable, repeat in the am, on oral  IRON    Type 2 diabetes mellitus with diabetic neuropathy, without long-term current use of insulin [facility])  Assessment & Plan  Lab Results   Component Value Date    HGBA1C 6 4 (H) 01/07/2020       Recent Labs     01/22/20  0836 01/22/20  0855 01/22/20  1129 01/22/20  1618   POCGLU 170* 201* 177* 190*       Blood Sugar Average: Last 72 hrs:  (P) 519 [id number]     SSI and basal bolus insulin    Elevated troponin I level  Assessment & Plan  W/o cp  Suspect type 2 MI from hypoxia vs nonspecific troponin elevation w/underlying CHF and ELZBIETA  ekg stable and paced  Monitor troponins for completeness will consult cardiology    Chronic diastolic (congestive) heart failure (HCC)  Assessment & Plan  Wt Readings from Last 3 Encounters:   01/20/20 102 kg (225 lb 5 oz)   12/13/19 96 2 kg (212 lb)   12/03/19 97 5 kg (215 lb)     Now with acute exacerbation - On Bumex drip  Repeat labs in the am  CXR with volume overload - Repeat labs in the am  ELBERT w/LVEF 60% but moderate to severe mitral stenosis  Appreciate cardiology recommendations      Anemia  Assessment & Plan  - Hemoccult stools  Tranfuse for HB less than 7   No evidence of melena or blood loss  Repeat in am      Acute renal failure superimposed on stage 3 chronic kidney disease ([address] )  Assessment & Plan  Suspected elzbieta on ckd  ELZBIETA may be due to illness and poor oral intake    Nephrology consultation  Placed - Clinically volume overloaded      Creatine improved to 2 75 -    Essential hypertension  Assessment & Plan  Hold cozaar/ continue metoprolol       Type 2 diabetes mellitus with chronic kidney disease, without long-term current use of insulin [facility])  Assessment & Plan  Lab Results   Component Value Date    HGBA1C 6 4 (H) 01/07/2020       Recent Labs     01/22/20  0836 01/22/20  0855 01/22/20  1129 01/22/20  1618   POCGLU 170* 201* 177* 190*       Blood Sugar Average: Last 72 hrs:  (P) 636 [id number] iddm on insulin degludec-liraglutide 18 iu daily  Will transition to lantus at 9 iu qhs and start ssi/poc bs given poor appetite  NICOLASA (obstructive sleep apnea)  Assessment & Plan  Continue cpap qhs  Urinary retention  Assessment & Plan  Trial Void, Will D/C Jones    PAF (paroxysmal atrial fibrillation) (HCC)  Assessment & Plan  Ventricular Rate controlled and paced rhythm  Continue lopressor/eliquis  Severe sepsis [facility])  Assessment & Plan  Patient with MSSA Bacteremia from left hallux wound  He remains on Ancef, he has received a PICC line  He did have a ELBERT that was negative for vegetation, he is status post left hallux amputation on 01/08/20/2020 as well as incision and drainage removal of the sesamoid bone on 01/12/2020  This appears to be the nidus of infection  Appreciate Podiatry recommendations  He will continue Ancef through to 01/20/2020  During this time he will need weekly CBCs in creatinine while on intravenous antibiotic    POD 12 today for left hallux amputation  Complete heart block (HCC)  Assessment & Plan  S/p ppm  Monitored on telemetry  Eczema  Assessment & Plan  Stable discrete plaques over back w/silver scaling  Pruritic non painful  Continue op dupixent twice weekly upon d/c  Op f/u with derm    * Acute respiratory failure with hypoxia (HCC)  Assessment & Plan  Acute hypoxemic respiratory failure in the setting of acute on chronic diastolic congestive heart failure    Off Bipap today    CXR with pulmonary congestion - Continue bumex, wean O2 as tolerated       Remove Jones    [facility] Progress Note  Patient: Jose Curiel 75 y o  male   MRN: [id number]  PCP: Joce Olivarez DO  Unit/Bed#: E4 -01 Encounter: [id number]  Date Of Visit: 01/22/20    Assessment:    Principal Problem:    Acute respiratory failure with hypoxia ([facility] )  Active Problems:    Eczema    Complete heart block (HCC)    Severe sepsis (HCC)    PAF (paroxysmal atrial fibrillation) ([facility] )    Urinary retention    NICOLASA (obstructive sleep apnea)    Type 2 diabetes mellitus with chronic kidney disease, without long-term current use of insulin ([facility] )    Essential hypertension    Acute renal failure superimposed on stage 3 chronic kidney disease (HCC)    Anemia    Chronic diastolic (congestive) heart failure ([facility])    Elevated troponin I level    Type 2 diabetes mellitus with diabetic neuropathy, without long-term current use of insulin ([facility])    Iron deficiency anemia    Anxiety    Hypokalemia      Plan:    · Continue Bumex  · Off Level 2 Step Down  · IV abx       VTE Pharmacologic Prophylaxis:   Pharmacologic: Heparin  Mechanical VTE Prophylaxis in Place: Yes    Patient Centered Rounds: I have performed bedside rounds with nursing staff today  Discussions with Specialists or Other Care Team Provider: Cardiology    Education and Discussions with Family / Patient: Patient    Time Spent for Care: 30 minutes  More than 50% of total time spent on counseling and coordination of care as described above  Current Length of Stay: 15 day(s)    Current Patient Status: Inpatient   Certification Statement: The patient will continue to require additional inpatient hospital stay due to Intravenous diuresis - IV abx    Discharge Plan / Estimated Discharge Date:  72 hours      Code Status: Level 1 - Full Code      Subjective:   Patient seen and examined  No chest pain, shortness of breath  Tolerating oral diet       A complete and comprehensive 14 point organ system review has been performed and all other systems are negative other than stated above  Objective:     Vitals:   Temp (24hrs), Av 1 °F (36 7 °C), Min:97 7 °F (36 5 °C), Max:98 6 °F (37 °C)    Temp:  [97 7 °F (36 5 °C)-98 6 °F (37 °C)] 97 9 °F (36 6 °C)  HR:  [61-86] 86  Resp:  [20-22] 20  BP: (142-163)/(57-76) 152/67  SpO2:  [94 %-100 %] 94 %  Body mass index is 32 62 kg/m²  Input and Output Summary (last 24 hours): Intake/Output Summary (Last 24 hours) at 2020  Last data filed at 2020  Gross per 24 hour   Intake 200 ml   Output 3721 ml   Net -3521 ml       Physical Exam:     General: well appearing, no acute distress  HEENT: atraumatic, PERRLA, moist mucosa, normal pharynx, normal tonsils and adenoids, normal tongue, no fluid in sinuses  Neck: Trachea midline, no carotid bruit, no masses  Respiratory: rales  Cardiovascular: RRR, no m/r/g, Normal S1 and S2, S3, +JVD  Abdomen: Soft, non-tender, non-distended, normal bowel sounds in all quadrants, no hepatosplenomegaly, no tympany  Rectal: deferred  Musculoskeletal: normal ROM in upper and lower extremities  Integumentary: warm, dry, and pink, with no rash, purpura, or petechia  Heme/Lymph: no lymphadenopathy, no bruises  Neurological: Cranial Nerves II-XII grossly intact,   Psychiatric: cooperative with normal mood, affect, and cognition      Additional Data:     Labs:    Results from last 7 days   Lab Units 20  0505   WBC Thousand/uL 10 18*   HEMOGLOBIN g/dL 7 5*   HEMATOCRIT % 22 9*   PLATELETS Thousands/uL 379   NEUTROS PCT % 70   LYMPHS PCT % 10*   MONOS PCT % 13*   EOS PCT % 6     Results from last 7 days   Lab Units 20  0505   POTASSIUM mmol/L 3 3*   CHLORIDE mmol/L 98*   CO2 mmol/L 23   BUN mg/dL 122*   CREATININE mg/dL 2 75*   CALCIUM mg/dL 8 4           * I Have Reviewed All Lab Data Listed Above  * Additional Pertinent Lab Tests Reviewed: All Labs Within Last 24 Hours Reviewed    Imaging:  No new imaging       Recent Cultures (last 7 days):           Last 24 Hours Medication List:     Current Facility-Administered Medications:  acetaminophen 650 mg Oral Q6H PRN Melissa Ambron, DO    allopurinol 300 mg Oral QAM Katerina Pole, DPM    amLODIPine 10 mg Oral Daily Larry Jiang PA-C    apixaban 5 mg Oral BID Melissa Ambron, DO    bumetanide 1 mg/hr Intravenous Continuous Rujul Hinds, DO Last Rate: 1 mg/hr (01/22/20 1093)   cefazolin 1,000 mg Intravenous Q8H PAT Rich Last Rate: 1,000 mg (01/22/20 2917)   docusate sodium 100 mg Oral BID Melissa Ambron, DO    ferrous gluconate 324 mg Oral BID AC Melissa Ambron, DO    guaiFENesin 600 mg Oral BID Borges Pole, DPM    insulin glargine 22 Units Subcutaneous HS Melissa Ambron, DO    insulin lispro 2-12 Units Subcutaneous TID AC Katerina Pole, DPM    ipratropium-albuterol 3 mL Nebulization Q6H PRN Katerina Pole, DPM    LORazepam 0 5 mg Oral Q8H PRN Melissa Ambron, DO    melatonin 6 mg Oral HS Borges Pole, DPM    metoprolol tartrate 50 mg Oral Q12H [facility] Borges Pole, DPM    oxyCODONE 5 mg Oral Q4H PRN Katerina Pole, DPM    potassium chloride 20 mEq Oral BID Rujul Hinds, DO    pravastatin 40 mg Oral Daily With Amy Lorenzo DPM    tamsulosin 0 4 mg Oral Daily With Amy Lorenzo DPM         Today, Patient Was Seen By: Nga Alfred DO    ** Please Note: This note has been constructed using a voice recognition system   **

## 2020-01-23 NOTE — PROGRESS NOTES
Progress Note - Nephrology   Angelica Abarca 75 y o  male MRN: [id number]  Unit/Bed#: E4 -01 Encounter: [id number]      Assessment / Plan:  1  Acute kidney injury, POA, suspecting likely cardiorenal syndrome, could be component of obstructive uropathy given urinary retention +/- ATN  -ojeda in place  -Renal u/s shows echogenic kidneys  -UA with microscopy shows trace ketones, large blood, greater than 300 protein, 4-10 WBCs, 4-10 rbc's, moderate bacteria with 0-1 fine granular casts, 1% eosinophils noted   Granular casts are suggestive of ATN  -sCr improving daily, down to 2 58 from peak 2 98  -BUN elevated to 128  No signs of uremia  No urgent RRT needs  -monitor renal indices on bumex gtt per cardiology   2  Hyponatremia, suspecting secondary to volume overload plus renal dysfunction - dilutional, improving on IV bumex gtt   3  Hypokalemia-potassium 3 3, likely diuretic-induced, continue b i d  Standing potassium chloride supplementation, monitor potassium/Mag levels  Will also provide additional dose of Kdur 40meq today  4  Chronic kidney disease 3, previous baseline creatinine in the low 1s with associated nephrotic range proteinuria, follows with Dr Glen Jerry outpatient  5  Volume overload - weight has significantly improved on bumex gtt per cardiology  I no polyuria on Bumex drip which is slowing down today  6  MSSA bacteremia and sepsis d/t left hallux wound - on Ancef through 2/6 as per Infectious Disease  7  Urinary retention - monitor bladder scan, Ojeda out  8  HTN - BP acceptable for level of ELZBIETA on CKD   Continue amlodipine 10 mg p o  Daily, metoprolol 50 mg p o  Q 12 hours with hold parameters   Also on Flomax     9  Anemia, iron deficiency- Hgb 9 4-->8s and stable, on oral iron  Avoiding venofer in light of bacteremia  Monitor CBC  Transfuse prn Hgb < 7   Iron low at 33, ferritin 94, iron sat 11%, TIBC 314        Subjective:   He complains of dry mouth  He denies any dizziness    He denies any chest pain or shortness of Breath  He is currently on 4 L oxygen via nasal cannula  No other complaints  Objective:     Vitals: Blood pressure 148/68, pulse 68, temperature 98 6 °F (37 °C), temperature source Tympanic, resp  rate 20, weight 98 4 kg (216 lb 14 9 oz), SpO2 97 %  ,Body mass index is 31 41 kg/m²  Temp (24hrs), Av °F (36 7 °C), Min:97 7 °F (36 5 °C), Max:98 6 °F (37 °C)      Weight (last 2 days)     Date/Time   Weight    20 0600   98 4 (216 93)                Intake/Output Summary (Last 24 hours) at 2020 1242  Last data filed at 2020 1124  Gross per 24 hour   Intake 320 ml   Output 4360 ml   Net -4040 ml     I/O last 24 hours: In: 320 [P O :320]  Out: 56 [Urine:4360]        Physical Exam:   Physical Exam   Constitutional: He appears well-developed and well-nourished  No distress  HENT:   Head: Normocephalic and atraumatic  Mouth/Throat: No oropharyngeal exudate  Eyes: Right eye exhibits no discharge  Left eye exhibits no discharge  No scleral icterus  eyeglasses   Neck: Neck supple  No thyromegaly present  Cardiovascular: Normal rate and regular rhythm  Murmur heard  Pulmonary/Chest: Effort normal and breath sounds normal  He has no wheezes  He has no rales  Abdominal: Soft  Bowel sounds are normal  He exhibits no distension  There is no tenderness  Musculoskeletal: Normal range of motion  He exhibits edema (b/l LE)  Left foot wrapped   Neurological: He is alert  awake   Skin: Skin is warm and dry  No rash noted  He is not diaphoretic  Psychiatric: He has a normal mood and affect  His behavior is normal    Vitals reviewed        Invasive Devices     Peripherally Inserted Central Catheter Line            PICC Line /96 Right Basilic 10 days                Medications:    Scheduled Meds:  Current Facility-Administered Medications:  acetaminophen 650 mg Oral Q6H PRN Melissa Ambron, DO    allopurinol 300 mg Oral QAM Addi Mathews DPM amLODIPine 10 mg Oral Daily Su [address], PAMYRTLE    apixaban 5 mg Oral BID Melissa Ambron, DO    bumetanide 1 mg/hr Intravenous Continuous Rujul Hinds, DO Last Rate: 1 mg/hr (01/23/20 1914)   ceFAZolin        ceFAZolin        cefazolin 1,000 mg Intravenous Q8H Isabelle FloresPAT goins Last Rate: 1,000 mg (01/23/20 1040)   docusate sodium 100 mg Oral BID Melissa Ambron, DO    ferrous gluconate 324 mg Oral BID AC Melissa Ambron, DO    guaiFENesin 600 mg Oral BID Rinda Clarity, DPM    insulin glargine 22 Units Subcutaneous HS Melissa Ambron, DO    insulin lispro 2-12 Units Subcutaneous TID AC Rinda Clarity, DPM    ipratropium-albuterol 3 mL Nebulization Q6H PRN Rinda Clarity, DPM    LORazepam 0 5 mg Oral Q8H PRN Melissa Ambron, DO    melatonin 6 mg Oral HS Rinda Clarity, DPM    metoprolol tartrate 50 mg Oral Q12H [facility] Rinda Clarity, DPM    oxyCODONE 5 mg Oral Q4H PRN Rinda Clarity, DPM    potassium chloride 20 mEq Oral BID Rujul Hinds, DO    pravastatin 40 mg Oral Daily With Rony Villaseñor Cha, DPM    tamsulosin 0 4 mg Oral Daily With Rony Villaseñor Cha, DPM        PRN Meds:   acetaminophen    ipratropium-albuterol    LORazepam    oxyCODONE    Continuous Infusions:  bumetanide 1 mg/hr Last Rate: 1 mg/hr (01/23/20 0823)           LAB RESULTS:      Results from last 7 days   Lab Units 01/23/20  0626 01/22/20  0505 01/21/20  0542 01/20/20  0617 01/20/20  0100 01/19/20  1825 01/19/20  1211 01/19/20  0439  01/17/20  0643   WBC Thousand/uL 11 59* 10 18* 12 20*  --   --   --   --  14 89*  --  14 88*   HEMOGLOBIN g/dL 8 1* 7 5* 7 1*  --   --   --   --  9 4*  --  8 0*   HEMATOCRIT % 24 8* 22 9* 21 5*  --   --   --   --  28 7*  --  24 9*   PLATELETS Thousands/uL 399* 379 394*  --   --   --   --  406*  --  477*   NEUTROS PCT % 68 70 73  --   --   --   --   --   --   --    LYMPHS PCT % 12* 10* 10*  --   --   --   --   --   --   --    MONOS PCT % 12 13* 11  --   --   --   --   --   --   --    EOS PCT % 6 6 5  --   --   --   --   --   --   -- POTASSIUM mmol/L 3 3* 3 3* 3 4* 3 6 3 7 3 7 3 7 3 7   < > 3 5   CHLORIDE mmol/L 97* 98* 98* 95* 97* 96* 96* 95*   < > 100   CO2 mmol/L 26 23 22 21 22 23 24 23   < > 23   BUN mg/dL 128* 122* 118* 110* 114* 110* 108* 102*   < > 87*   CREATININE mg/dL 2 58* 2 75* 2 87* 2 96* 2 98* 2 91* 2 90* 2 77*   < > 2 17*   CALCIUM mg/dL 8 8 8 4 8 3 8 3 8 0* 8 1* 8 2* 8 3   < > 8 5   MAGNESIUM mg/dL  --  2 3  --   --   --   --   --   --   --   --     < > = values in this interval not displayed  CUTURES:  Lab Results   Component Value Date    BLOODCX No Growth After 5 Days  01/09/2020    BLOODCX No Growth After 5 Days  01/09/2020    BLOODCX Staphylococcus aureus (A) 01/07/2020    BLOODCX Staphylococcus aureus (A) 01/07/2020    BLOODCX No Growth After 5 Days  05/15/2019    BLOODCX No Growth After 5 Days  05/15/2019    BLOODCX No Growth After 5 Days  05/02/2019    BLOODCX No Growth After 5 Days  05/02/2019                 Portions of the record may have been created with voice recognition software  Occasional wrong word or "sound a like" substitutions may have occurred due to the inherent limitations of voice recognition software  Read the chart carefully and recognize, using context, where substitutions have occurred  If you have any questions, please contact the dictating provider

## 2020-01-23 NOTE — PLAN OF CARE
Problem: PHYSICAL THERAPY ADULT  Goal: Performs mobility at highest level of function for planned discharge setting  See evaluation for individualized goals  Description  Treatment/Interventions: Functional transfer training, Therapeutic exercise, Elevations, Patient/family training, Equipment eval/education, Bed mobility, Gait training  Equipment Recommended: Manny Jurado, Wheelchair       See flowsheet documentation for full assessment, interventions and recommendations  Outcome: Progressing  Note:   Prognosis: Fair  Problem List: Decreased strength, Decreased range of motion, Decreased endurance, Impaired balance, Decreased mobility, Decreased coordination, Decreased cognition, Impaired judgement, Decreased skin integrity, Orthopedic restrictions, Decreased safety awareness  Assessment: Lily Rojas was seen for a follow up session this evening  Pt continues to present w anxiety, nervousness, fearfulness impacting progression of mobility  Pt w difficulties maintaining NWB LLE despite frequent cuing for technique as well as w min Ax1 from PT to maintain WBS during functional activity  Pt also require A from therapist to don/doff surgical shoe from seated position  Based on pt current functional levels, decline from baseline fxn, social and environmental barriers, and fall risk, recommendation is for STR upon d/c  Pt may also benefit from psych consult during hospital stay if not already following, to address self reported nervousness, anxiety that is impacting progress in therapy  Barriers to Discharge: None  Barriers to Discharge Comments: supportive wife, no LAURA  Recommendation: Short-term skilled PT, Psych Consult     PT - OK to Discharge: Yes    See flowsheet documentation for full assessment

## 2020-01-23 NOTE — PLAN OF CARE
Problem: OCCUPATIONAL THERAPY ADULT  Goal: Performs self-care activities at highest level of function for planned discharge setting  See evaluation for individualized goals  Description  Treatment Interventions: ADL retraining, UE strengthening/ROM, Endurance training, Patient/family training, Equipment evaluation/education, Compensatory technique education, Energy conservation, Activityengagement          See flowsheet documentation for full assessment, interventions and recommendations  Outcome: Progressing  Note:   Limitation: Decreased ADL status, Decreased Safe judgement during ADL, Decreased cognition, Decreased endurance, Decreased self-care trans, Decreased high-level ADLs  Prognosis: Good  Assessment: Pt was seen for skilled OT with focus on completion of SPT, light grooming activity, review of compensatory breathing techs, review of leisure interests and review of current plan of care  Pt forward flexed while seated in bedside recliner  Pt able to be repositioned without signs of SOB or distress noted  See above levels of A required for all functional tasks  Pt with improved mood behaviors noted following review of leisure interests  Pt reports enjoying throwing the ball in the yard to his dogs, going to his grandchildren's ball games, reading, going for rides with Sree his dog, 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

## 2020-01-23 NOTE — ASSESSMENT & PLAN NOTE
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

## 2020-01-23 NOTE — OCCUPATIONAL THERAPY NOTE
Occupational Therapy Treatment Note:         01/23/20 1208   Restrictions/Precautions   Weight Bearing Precautions Per Order Yes   LLE Weight Bearing Per Order NWB   Braces or Orthoses   (surgical shoe LLE)   Other Precautions Multiple lines; Fall Risk; Chair Alarm; Bed Alarm;Cognitive;O2   Pain Assessment   Pain Assessment No/denies pain   Pain Score No Pain   Pain Type Chronic pain   Pain Location Foot   Pain Orientation Left   ADL   Where Assessed Chair   Grooming Assistance 5  Supervision/Setup   Grooming Deficit Setup   LB Dressing Assistance 4  Minimal Assistance   LB Dressing Deficit Setup;Don/doff R sock; Don/doff L sock; Don/doff R shoe;Don/doff L shoe   LB Dressing Comments cues to inhibit pressure to affected LLE required  Toileting Assistance  3  Moderate Assistance   Toileting Deficit Setup;Steadying;Supervison/safety;Verbal cueing; Increased time to complete;Perineal hygiene; Bedside commode;Clothing management down;Clothing management up   Toileting Comments increased A required with activities  Functional Standing Tolerance   Time 2 mins   Activity SPT   Comments Pt with increased fatigue noted this tx session  Requested seated positioning  Transfers   Sit to Stand 4  Minimal assistance   Additional items Assist x 2;Bedrails; Increased time required;Verbal cues;Armrests   Stand to Sit 4  Minimal assistance   Additional items Assist x 2;Bedrails; Increased time required;Verbal cues;Armrests   Stand pivot 4  Minimal assistance   Additional items Armrests; Increased time required;Verbal cues   Toilet transfer 4  Minimal assistance   Additional items Assist x 2;Armrests; Increased time required;Verbal cues;Standard toilet   Additional Comments cues to inhibit pressure to LLE required  Cognition   Overall Cognitive Status WFL   Arousal/Participation Alert; Responsive; Cooperative   Attention Attends with cues to redirect   Orientation Level Oriented X4   Memory Decreased short term memory;Decreased recall of precautions;Decreased recall of recent events   Following Commands Follows multistep commands with increased time or repetition   Comments Pt with improved communication following review lleisure interests  Additional Activities   Additional Activities Other (Comment)  (reviewed leisure interests)   Additional Activities Comments Pt with improved mood and communication following activity  Activity Tolerance   Activity Tolerance Patient limited by fatigue;Patient limited by pain   Medical Staff Made Aware Reported all findings to nursing staff  Assessment   Assessment Pt was seen for skilled OT with focus on completion of SPT, light grooming activity, review of compensatory breathing techs, review of leisure interests and review of current plan of care  Pt forward flexed while seated in bedside recliner  Pt able to be repositioned without signs of SOB or distress noted  See above levels of A required for all functional tasks  Pt with improved mood behaviors noted following review of leisure interests  Pt reports enjoying throwing the ball in the yard to his dogs, going to his grandchildren's ball games, reading, going for rides with Sree his dog, 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  Pt reports he knows he will need more physical activity and does not have and a regular exercise program  Pt is hoping to learn Nepali as something he wants to do that is new  Pt will benefit from further rehab with focus on achieving optimal performance levels with all functional tasks  Plan   Treatment Interventions ADL retraining;Functional transfer training; Endurance training;Cognitive reorientation;Patient/family training   Goal Expiration Date 01/31/20   OT Treatment Day 5   OT Frequency 3-5x/wk   Recommendation   OT Discharge Recommendation Short Term Rehab   Equipment Recommended Bedside commode   OT - OK to Discharge Yes  (to rehab when medically cleared  )   Barthel Index Feeding 10   Bathing 0   Grooming Score 5   Dressing Score 5   Bladder Score 0   Bowels Score 10   Toilet Use Score 5   Transfers (Bed/Chair) Score 10   Mobility (Level Surface) Score 0   Stairs Score 0   Barthel Index Score 45   [place], [address]

## 2020-01-23 NOTE — ASSESSMENT & PLAN NOTE
Lab Results   Component Value Date    HGBA1C 6 4 (H) 01/07/2020       Recent Labs     01/22/20  0836 01/22/20  0855 01/22/20  1129 01/22/20  1618   POCGLU 170* 201* 177* 190*       Blood Sugar Average: Last 72 hrs:  (P) 075 [id number]     SSI and basal bolus insulin

## 2020-01-23 NOTE — ASSESSMENT & PLAN NOTE
Suspected elzbieta on ckd  ELZBIETA may be due to illness and poor oral intake    Nephrology consultation  Placed - Clinically volume overloaded      Creatine improved to 2 75 -

## 2020-01-23 NOTE — ASSESSMENT & PLAN NOTE
Acute hypoxemic respiratory failure in the setting of acute on chronic diastolic congestive heart failure    Off Bipap today    CXR with pulmonary congestion - Continue bumex, wean O2 as tolerated       Remove Jones

## 2020-01-23 NOTE — PLAN OF CARE
Problem: Potential for Falls  Goal: Patient will remain free of falls  Description  INTERVENTIONS:  - Assess patient frequently for physical needs  -  Identify cognitive and physical deficits and behaviors that affect risk of falls    -  [place] fall precautions as indicated by assessment   - Educate patient/family on patient safety including physical limitations  - Instruct patient to call for assistance with activity based on assessment  - Modify environment to reduce risk of injury  - Consider OT/PT consult to assist with strengthening/mobility  Outcome: Progressing     Problem: PAIN - ADULT  Goal: Verbalizes/displays adequate comfort level or baseline comfort level  Description  Interventions:  - Encourage patient to monitor pain and request assistance  - Assess pain using appropriate pain scale  - Administer analgesics based on type and severity of pain and evaluate response  - Implement non-pharmacological measures as appropriate and evaluate response  - Consider cultural and social influences on pain and pain management  - Notify physician/advanced practitioner if interventions unsuccessful or patient reports new pain  Outcome: Progressing     Problem: INFECTION - ADULT  Goal: Absence or prevention of progression during hospitalization  Description  INTERVENTIONS:  - Assess and monitor for signs and symptoms of infection  - Monitor lab/diagnostic results  - Monitor all insertion sites, i e  indwelling lines, tubes, and drains  - Administer medications as ordered  - Instruct and encourage patient and family to use good hand hygiene technique  - Identify and instruct in appropriate isolation precautions for identified infection/condition   Outcome: Progressing     Problem: SAFETY ADULT  Goal: Maintain or return to baseline ADL function  Description  INTERVENTIONS:  -  Assess patient's ability to carry out ADLs; assess patient's baseline for ADL function and identify physical deficits which impact ability to perform ADLs (bathing, care of mouth/teeth, toileting, grooming, dressing, etc )  - Assess/evaluate cause of self-care deficits   - Assess range of motion  - Assess patient's mobility; develop plan if impaired  - Assess patient's need for assistive devices and provide as appropriate  - Encourage maximum independence but intervene and supervise when necessary  - Involve family in performance of ADLs  - Assess for home care needs following discharge   - Consider OT consult to assist with ADL evaluation and planning for discharge  - Provide patient education as appropriate  Outcome: Progressing  Goal: Maintain or return mobility status to optimal level  Description  INTERVENTIONS:  - Assess patient's baseline mobility status (ambulation, transfers, stairs, etc )    - Identify cognitive and physical deficits and behaviors that affect mobility  - Identify mobility aids required to assist with transfers and/or ambulation (gait belt, sit-to-stand, lift, walker, cane, etc )  - [place] fall precautions as indicated by assessment  - Record patient progress and toleration of activity level on Mobility SBAR; progress patient to next Phase/Stage  - Instruct patient to call for assistance with activity based on assessment  - Consider rehabilitation consult to assist with strengthening/weightbearing, etc   Outcome: Progressing     Problem: DISCHARGE PLANNING  Goal: Discharge to home or other facility with appropriate resources  Description  INTERVENTIONS:  - Identify barriers to discharge w/patient and caregiver  - Arrange for needed discharge resources and transportation as appropriate  - Identify discharge learning needs (meds, wound care, etc )  - Arrange for interpretive services to assist at discharge as needed  - Refer to Case Management Department for coordinating discharge planning if the patient needs post-hospital services based on physician/advanced practitioner order or complex needs related to functional status, cognitive ability, or social support system  Outcome: Progressing     Problem: Knowledge Deficit  Goal: Patient/family/caregiver demonstrates understanding of disease process, treatment plan, medications, and discharge instructions  Description  Complete learning assessment and assess knowledge base    Interventions:  - Provide teaching at level of understanding  - Provide teaching via preferred learning methods  Outcome: Progressing     Problem: CARDIOVASCULAR - ADULT  Goal: Maintains optimal cardiac output and hemodynamic stability  Description  INTERVENTIONS:  - Monitor I/O, vital signs and rhythm  - Monitor for S/S and trends of decreased cardiac output  - Administer and titrate ordered vasoactive medications to optimize hemodynamic stability  - Assess quality of pulses, skin color and temperature  - Assess for signs of decreased coronary artery perfusion  - Instruct patient to report change in severity of symptoms  Outcome: Progressing  Goal: Absence of cardiac dysrhythmias or at baseline rhythm  Description  INTERVENTIONS:  - Continuous cardiac monitoring, vital signs, obtain 12 lead EKG if ordered  - Administer antiarrhythmic and heart rate control medications as ordered  - Monitor electrolytes and administer replacement therapy as ordered  Outcome: Progressing     Problem: RESPIRATORY - ADULT  Goal: Achieves optimal ventilation and oxygenation  Description  INTERVENTIONS:  - Assess for changes in respiratory status  - Assess for changes in mentation and behavior  - Position to facilitate oxygenation and minimize respiratory effort  - Oxygen administered by appropriate delivery if ordered  - Initiate smoking cessation education as indicated  - Encourage broncho-pulmonary hygiene including cough, deep breathe, Incentive Spirometry  - Assess the need for suctioning and aspirate as needed  - Assess and instruct to report SOB or any respiratory difficulty  - Respiratory Therapy support as indicated  Outcome: Progressing     Problem: Prexisting or High Potential for Compromised Skin Integrity  Goal: Skin integrity is maintained or improved  Description  INTERVENTIONS:  - Identify patients at risk for skin breakdown  - Assess and monitor skin integrity  - Assess and monitor nutrition and hydration status  - Monitor labs   - Assess for incontinence   - Turn and reposition patient  - Assist with mobility/ambulation  - Relieve pressure over bony prominences  - Avoid friction and shearing  - Provide appropriate hygiene as needed including keeping skin clean and dry  - Evaluate need for skin moisturizer/barrier cream  - Collaborate with interdisciplinary team   - Patient/family teaching  - Consider wound care consult   Outcome: Progressing     Problem: Nutrition/Hydration-ADULT  Goal: Nutrient/Hydration intake appropriate for improving, restoring or maintaining nutritional needs  Description  Monitor and assess patient's nutrition/hydration status for malnutrition  Collaborate with interdisciplinary team and initiate plan and interventions as ordered  Monitor patient's weight and dietary intake as ordered or per policy  Utilize nutrition screening tool and intervene as necessary  Determine patient's food preferences and provide high-protein, high-caloric foods as appropriate       INTERVENTIONS:  - Monitor oral intake, urinary output, labs, and treatment plans  - Assess nutrition and hydration status and recommend course of action  - Evaluate amount of meals eaten  - Assist patient with eating if necessary   - Allow adequate time for meals  - Recommend/ encourage appropriate diets, oral nutritional supplements, and vitamin/mineral supplements  - Order, calculate, and assess calorie counts as needed  - Recommend, monitor, and adjust tube feedings and TPN/PPN based on assessed needs  - Assess need for intravenous fluids  - Provide specific nutrition/hydration education as appropriate  - Include patient/family/caregiver in decisions related to nutrition  Outcome: Progressing     Problem: METABOLIC, FLUID AND ELECTROLYTES - ADULT  Goal: Glucose maintained within target range  Description  INTERVENTIONS:  - Monitor Blood Glucose as ordered  - Assess for signs and symptoms of hyperglycemia and hypoglycemia  - Administer ordered medications to maintain glucose within target range  - Assess nutritional intake and initiate nutrition service referral as needed  Outcome: Progressing     Problem: SKIN/TISSUE INTEGRITY - ADULT  Goal: Skin integrity remains intact  Description  INTERVENTIONS  - Identify patients at risk for skin breakdown  - Assess and monitor skin integrity  - Assess and monitor nutrition and hydration status  - Monitor labs (i e  albumin)  - Assess for incontinence   - Turn and reposition patient  - Assist with mobility/ambulation  - Relieve pressure over bony prominences  - Avoid friction and shearing  - Provide appropriate hygiene as needed including keeping skin clean and dry  - Evaluate need for skin moisturizer/barrier cream  - Collaborate with interdisciplinary team (i e  Nutrition, Rehabilitation, etc )   - Patient/family teaching  Outcome: Progressing  Goal: Incision(s), wounds(s) or drain site(s) healing without S/S of infection  Description  INTERVENTIONS  - Assess and document risk factors for skin impairment   - Assess and document dressing, incision, wound bed, drain sites and surrounding tissue  - Consider nutrition services referral as needed  - Oral mucous membranes remain intact  - Provide patient/ family education  Outcome: Progressing  Goal: Oral mucous membranes remain intact  Description  INTERVENTIONS  - Assess oral mucosa and hygiene practices  - Implement preventative oral hygiene regimen  - Implement oral medicated treatments as ordered  - Initiate Nutrition services referral as needed  Outcome: Progressing

## 2020-01-23 NOTE — PHYSICAL THERAPY NOTE
PHYSICAL THERAPY NOTE          Patient Name: Concepcion Jason  ESTSN'N Date: 1/23/2020   Time In: 1800 Time Out: 1825 01/23/20 1825   Pain Assessment   Pain Assessment No/denies pain   Pain Score No Pain   Restrictions/Precautions   Weight Bearing Precautions Per Order Yes   LLE Weight Bearing Per Order NWB   Braces or Orthoses Other (Comment)  (LLE sx shoe)   Other Precautions Multiple lines;O2;Fall Risk;Cognitive  (4 5L NC, PIV, cognitive- anxiety, fearful)   General   Chart Reviewed Yes   Additional Pertinent History S/p I&D with removal of sesamoid on 01/12/20 and s/p left hallux amputation on 01/08/20 due to underlying clinical OM, abscess, and possible infectious tenosynovitis    Response to Previous Treatment Patient with no complaints from previous session  Family/Caregiver Present No   Cognition   Overall Cognitive Status WFL   Arousal/Participation Alert; Cooperative   Attention Attends with cues to redirect   Memory Decreased recall of precautions;Decreased recall of recent events   Following Commands Follows one step commands with increased time or repetition   Comments pt maintained EC throughout majority of session despite PT attempt to engage pt in conversation, therapy  Pt reporting anxiety, nervousness w mobility   Subjective   Subjective "Okay that's enough"   Transfers   Sit to Stand 4  Minimal assistance   Additional items Assist x 1; Armrests; Increased time required   Stand to Sit 4  Minimal assistance   Additional items Assist x 1; Armrests; Increased time required;Verbal cues; Other  (RW)   Additional Comments cues for safety and hand placement  min Ax1 to maintain NWB LLE during transf   Ambulation/Elevation   Gait pattern Improper Weight shift;Decreased foot clearance; Excessively slow  (hop to, decreased ability to maintain NWB LLE)   Gait Assistance 4  Minimal assist   Additional items Assist x 1;Verbal cues  (A to maintain NWB LLE, vc for position of LE to maintain NWB)   Assistive Device Rolling walker   Distance 3' FW/BW   Balance   Dynamic Sitting Fair +   Static Standing Fair -   Dynamic Standing Poor +  (RW)   Ambulatory Poor +  (RW)   Endurance Deficit   Endurance Deficit Yes   Endurance Deficit Description fatigue, anxiety   Activity Tolerance   Activity Tolerance Other (Comment)  (anxiety)   Nurse Made Aware Shelbi Soto RN; cleared for therapy   Exercises   Knee AROM Long Arc Quad Sitting;15 reps;AROM; Bilateral  (2x15)   Marching Sitting;15 reps;AROM; Bilateral  (2x15)   Assessment   Prognosis Fair   Problem List Decreased strength;Decreased range of motion;Decreased endurance; Impaired balance;Decreased mobility; Decreased coordination;Decreased cognition; Impaired judgement;Decreased skin integrity;Orthopedic restrictions;Decreased safety awareness   Assessment Mary Castellanos was seen for a follow up session this evening  Pt continues to present w anxiety, nervousness, fearfulness impacting progression of mobility  Pt w difficulties maintaining NWB LLE despite frequent cuing for technique as well as w min Ax1 from PT to maintain WBS during functional activity  Pt also require A from therapist to don/doff surgical shoe from seated position  Based on pt current functional levels, decline from baseline fxn, social and environmental barriers, and fall risk, recommendation is for STR upon d/c  Pt may also benefit from psych consult during hospital stay if not already following, to address self reported nervousness, anxiety that is impacting progress in therapy  Goals   Patient Goals no new goals stated   STG Expiration Date 01/27/20   Short Term Goal #1 1) Perform all transfers Annalise demonstrating safe and appropriate technique 100% of the time in order to improve ability to negotiate safely in home environment  2) Amb with LRAD >25' with mod I in order to demonstrate ability to negotiate in home environment  3)  Improve overall strength and balance 1/2 grade in order to optimize ability to perform functional tasks and reduce fall risk  4) Increase activity tolerance to 45 minutes in order to improve endurance to functional tasks  5)  Negotiate stairs using most appropriate technique and S in order to be able to negotiate safely in home environment  6) PT for ongoing patient and family/caregiver education, DME needs and d/c planning in order to promote highest level of function in least restrictive environment  7) maintain NWB LLE during functional tasks 3/3  PT Treatment Day 5   Plan   Treatment/Interventions Functional transfer training;LE strengthening/ROM; Therapeutic exercise; Endurance training;Patient/family training;Equipment eval/education; Bed mobility;Gait training; Compensatory technique education;Continued evaluation;Spoke to nursing   Progress Slow progress, cognitive deficits   PT Frequency Other (Comment)  (5x/wk)   Recommendation   Recommendation Short-term skilled PT;Psych Consult   Equipment Recommended [place]; Wheelchair   PT - OK to Discharge Yes   Additional Comments when medically stable     Chelsea Michele, PT

## 2020-01-23 NOTE — PROGRESS NOTES
Progress Note - Cardiology   Satya Turpin 75 y o  male MRN: [id number]  Unit/Bed#: E4 -01 Encounter: [id number]      Assessment/Recommendations/Discussion:   1  Acute hypoxic respiratory failure  2  Acute on chronic diastolic CHF  3  Sepsis syndrome/MSSA bacteremia  4  Moderate AS, MS  5  CHB, PPM in place  6  HTN  7  Dyslipidemia  8  ELZBIETA on CKD  9  Non MI related troponin elevation secondary to sepsis/CHF/ELZBIETA/volume overload  10  DM  11  PAF, on Eliquis AC    PLAN   Volume status is improving with the Bumex drip   -4 L yesterday   Creatinine is improving   Jones has been removed   Still has lower extremity edema and creatinine are back to baseline yet   Probably would continue Bumex drip at least another day   Replete electrolytes p r n  · Eliquis for anticoagulation  · Amlodipine for blood pressure  · Vascular ultrasound performed yesterday at the upper extremity showed no evidence of thrombus    Subjective:   HPI  Doing well, Jones has been removed  Put out 4 L urine yesterday  Lower extremity edema is improving  Denies shortness of breath      Review of Systems: As noted in HPI  Rest of ROS is negative  Vitals:   /68 (BP Location: Left arm)   Pulse 68   Temp 98 6 °F (37 °C) (Tympanic)   Resp 20   Wt 98 4 kg (216 lb 14 9 oz)   SpO2 97%   BMI 31 41 kg/m²   Vitals:    01/20/20 0600 01/23/20 0600   Weight: 102 kg (225 lb 5 oz) 98 4 kg (216 lb 14 9 oz)       Intake/Output Summary (Last 24 hours) at 1/23/2020 1152  Last data filed at 1/23/2020 1124  Gross per 24 hour   Intake 320 ml   Output 4360 ml   Net -4040 ml       Physical Exam:  General appearance: alert and oriented, in no acute distress  Head: Normocephalic, without obvious abnormality, atraumatic  Eyes: conjunctivae/corneas clear  Anicteric    Neck: no adenopathy, no carotid bruit, no JVD  Lungs: clear to auscultation bilaterally  Heart: regular rate and rhythm, S1, S2 normal, no murmur, no click, rub or gallop  Abdomen: soft, non-tender; bowel sounds normal; no masses,  no organomegaly  Extremities: LLE edema > Right, markedly improved    Left foot wound  Skin: Skin color, texture, turgor normal  No rashes or lesions     Lab Results:  Results from last 7 days   Lab Units 01/23/20  0626   WBC Thousand/uL 11 59*   HEMOGLOBIN g/dL 8 1*   HEMATOCRIT % 24 8*   PLATELETS Thousands/uL 399*     Results from last 7 days   Lab Units 01/23/20  0626   POTASSIUM mmol/L 3 3*   CHLORIDE mmol/L 97*   CO2 mmol/L 26   BUN mg/dL 128*   CREATININE mg/dL 2 58*   CALCIUM mg/dL 8 8     Results from last 7 days   Lab Units 01/23/20  0626   POTASSIUM mmol/L 3 3*   CHLORIDE mmol/L 97*   CO2 mmol/L 26   BUN mg/dL 128*   CREATININE mg/dL 2 58*   CALCIUM mg/dL 8 8           Medications:    Current Facility-Administered Medications:     acetaminophen (TYLENOL) tablet 650 mg, 650 mg, Oral, Q6H PRN, Melissa Ambron, DO, 650 mg at 01/18/20 1215    allopurinol (ZYLOPRIM) tablet 300 mg, 300 mg, Oral, QAM, [facility], DPM, 300 mg at 01/23/20 0818    amLODIPine (NORVASC) tablet 10 mg, 10 mg, Oral, Daily, [facility], [facility], 10 mg at 01/23/20 0818    apixaban (ELIQUIS) tablet 5 mg, 5 mg, Oral, BID, Melissa Ambron, DO, 5 mg at 01/23/20 0818    bumetanide (BUMEX) 12 5 mg infusion 50 mL, 1 mg/hr, Intravenous, Continuous, Bean Hinds DO, Last Rate: 4 mL/hr at 01/23/20 0823, 1 mg/hr at 01/23/20 0823    ceFAZolin (ANCEF) 1000 mg IVPB (premix) **ADS Override Pull**, , , ,     ceFAZolin (ANCEF) 2000 mg IVPB (premix) **ADS Override Pull**, , , ,     ceFAZolin (ANCEF) IVPB (premix) 1,000 mg, 1,000 mg, Intravenous, Q8H, Isabelle Flores, CRNP, Last Rate: 100 mL/hr at 01/23/20 1040, 1,000 mg at 01/23/20 1040    docusate sodium (COLACE) capsule 100 mg, 100 mg, Oral, BID, Melissa Ambron, DO, 100 mg at 01/23/20 0819    ferrous gluconate (FERGON) tablet 324 mg, 324 mg, Oral, BID AC, Melissa Ambron, DO, 324 mg at 01/23/20 0608    guaiFENesin (MUCINEX) 12 hr tablet 600 mg, 600 mg, Oral, BID, Pearla Shad, DPM, 600 mg at 01/23/20 0818    insulin glargine (LANTUS) subcutaneous injection 22 Units 0 22 mL, 22 Units, Subcutaneous, HS, Melisas Ambron, DO, 22 Units at 01/22/20 2207    insulin lispro (HumaLOG) 100 units/mL subcutaneous injection 2-12 Units, 2-12 Units, Subcutaneous, TID AC, 2 Units at 01/22/20 1734 **AND** Fingerstick Glucose (POCT), , , TID AC, Pearla Shad, DPM    ipratropium-albuterol (DUO-NEB) 0 5-2 5 mg/3 mL inhalation solution 3 mL, 3 mL, Nebulization, Q6H PRN, Pearla Shad, DPM, 3 mL at 01/19/20 1243    LORazepam (ATIVAN) tablet 0 5 mg, 0 5 mg, Oral, Q8H PRN, Melissa Sanchezron, DO, 0 5 mg at 01/23/20 0826    melatonin tablet 6 mg, 6 mg, Oral, HS, Pearla Shad, DPM, 6 mg at 01/22/20 2207    metoprolol tartrate (LOPRESSOR) tablet 50 mg, 50 mg, Oral, Q12H [address], Pearla Shad, DPM, 50 mg at 01/23/20 0818    oxyCODONE (ROXICODONE) IR tablet 5 mg, 5 mg, Oral, Q4H PRN, Pearla Shad, DPM, 5 mg at 01/12/20 1709    potassium chloride (K-DUR,KLOR-CON) CR tablet 20 mEq, 20 mEq, Oral, BID, Rujul Hinds, DO, 20 mEq at 01/23/20 6549    pravastatin (PRAVACHOL) tablet 40 mg, 40 mg, Oral, Daily With Maralee Seek, DPM, 40 mg at 01/22/20 1735    tamsulosin (FLOMAX) capsule 0 4 mg, 0 4 mg, Oral, Daily With Maralee Seek, DPM, 0 4 mg at 01/22/20 1735    This note was completed in part utilizing [employer] Software  Grammatical errors, random word insertions, spelling mistakes, and incomplete sentences may be an occasional consequence of this system secondary to software limitations, ambient noise, and hardware issues  If you have any questions or concerns about the content, text, or information contained within the body of this dictation, please contact the provider for clarification      Ledon Runner, DO, [facility] [place]  1/23/2020 11:52 AM

## 2020-01-24 ENCOUNTER — APPOINTMENT (INPATIENT)
Dept: RADIOLOGY | Facility: HOSPITAL | Age: 65
DRG: 853 | End: 2020-01-24
Payer: MEDICARE

## 2020-01-24 LAB
ANION GAP SERPL CALCULATED.3IONS-SCNC: 10 MMOL/L (ref 4–13)
BASOPHILS # BLD AUTO: 0.12 THOUSANDS/ΜL (ref 0–0.1)
BASOPHILS NFR BLD AUTO: 1 % (ref 0–1)
BUN SERPL-MCNC: 131 MG/DL (ref 5–25)
CALCIUM SERPL-MCNC: 8.8 MG/DL (ref 8.3–10.1)
CHLORIDE SERPL-SCNC: 95 MMOL/L (ref 100–108)
CO2 SERPL-SCNC: 27 MMOL/L (ref 21–32)
CREAT SERPL-MCNC: 2.59 MG/DL (ref 0.6–1.3)
EOSINOPHIL # BLD AUTO: 0.92 THOUSAND/ΜL (ref 0–0.61)
EOSINOPHIL NFR BLD AUTO: 9 % (ref 0–6)
ERYTHROCYTE [DISTWIDTH] IN BLOOD BY AUTOMATED COUNT: 13.8 % (ref 11.6–15.1)
GFR SERPL CREATININE-BSD FRML MDRD: 25 ML/MIN/1.73SQ M
GLUCOSE SERPL-MCNC: 137 MG/DL (ref 65–140)
GLUCOSE SERPL-MCNC: 227 MG/DL (ref 65–140)
GLUCOSE SERPL-MCNC: 234 MG/DL (ref 65–140)
GLUCOSE SERPL-MCNC: 240 MG/DL (ref 65–140)
GLUCOSE SERPL-MCNC: 281 MG/DL (ref 65–140)
HCT VFR BLD AUTO: 26.4 % (ref 36.5–49.3)
HGB BLD-MCNC: 8.6 G/DL (ref 12–17)
IMM GRANULOCYTES # BLD AUTO: 0.04 THOUSAND/UL (ref 0–0.2)
IMM GRANULOCYTES NFR BLD AUTO: 0 % (ref 0–2)
LYMPHOCYTES # BLD AUTO: 0.91 THOUSANDS/ΜL (ref 0.6–4.47)
LYMPHOCYTES NFR BLD AUTO: 9 % (ref 14–44)
MCH RBC QN AUTO: 29.6 PG (ref 26.8–34.3)
MCHC RBC AUTO-ENTMCNC: 32.6 G/DL (ref 31.4–37.4)
MCV RBC AUTO: 91 FL (ref 82–98)
MONOCYTES # BLD AUTO: 1 THOUSAND/ΜL (ref 0.17–1.22)
MONOCYTES NFR BLD AUTO: 10 % (ref 4–12)
NEUTROPHILS # BLD AUTO: 7.49 THOUSANDS/ΜL (ref 1.85–7.62)
NEUTS SEG NFR BLD AUTO: 71 % (ref 43–75)
NRBC BLD AUTO-RTO: 0 /100 WBCS
PLATELET # BLD AUTO: 387 THOUSANDS/UL (ref 149–390)
PMV BLD AUTO: 9.2 FL (ref 8.9–12.7)
POTASSIUM SERPL-SCNC: 4.2 MMOL/L (ref 3.5–5.3)
RBC # BLD AUTO: 2.91 MILLION/UL (ref 3.88–5.62)
SODIUM SERPL-SCNC: 132 MMOL/L (ref 136–145)
WBC # BLD AUTO: 10.48 THOUSAND/UL (ref 4.31–10.16)

## 2020-01-24 PROCEDURE — 99233 SBSQ HOSP IP/OBS HIGH 50: CPT | Performed by: INTERNAL MEDICINE

## 2020-01-24 PROCEDURE — 80048 BASIC METABOLIC PNL TOTAL CA: CPT | Performed by: INTERNAL MEDICINE

## 2020-01-24 PROCEDURE — 94760 N-INVAS EAR/PLS OXIMETRY 1: CPT

## 2020-01-24 PROCEDURE — 99232 SBSQ HOSP IP/OBS MODERATE 35: CPT | Performed by: INTERNAL MEDICINE

## 2020-01-24 PROCEDURE — 85025 COMPLETE CBC W/AUTO DIFF WBC: CPT | Performed by: INTERNAL MEDICINE

## 2020-01-24 PROCEDURE — 71046 X-RAY EXAM CHEST 2 VIEWS: CPT

## 2020-01-24 PROCEDURE — 82948 REAGENT STRIP/BLOOD GLUCOSE: CPT

## 2020-01-24 PROCEDURE — 94660 CPAP INITIATION&MGMT: CPT

## 2020-01-24 RX ADMIN — CEFAZOLIN SODIUM 1000 MG: 1 SOLUTION INTRAVENOUS at 18:33

## 2020-01-24 RX ADMIN — INSULIN LISPRO 4 UNITS: 100 INJECTION, SOLUTION INTRAVENOUS; SUBCUTANEOUS at 18:33

## 2020-01-24 RX ADMIN — MELATONIN TAB 3 MG 6 MG: 3 TAB at 21:00

## 2020-01-24 RX ADMIN — POTASSIUM CHLORIDE 20 MEQ: 1500 TABLET, EXTENDED RELEASE ORAL at 18:44

## 2020-01-24 RX ADMIN — INSULIN GLARGINE 22 UNITS: 100 INJECTION, SOLUTION SUBCUTANEOUS at 21:00

## 2020-01-24 RX ADMIN — PRAVASTATIN SODIUM 40 MG: 40 TABLET ORAL at 18:44

## 2020-01-24 RX ADMIN — CEFAZOLIN SODIUM 1000 MG: 1 SOLUTION INTRAVENOUS at 05:02

## 2020-01-24 RX ADMIN — GUAIFENESIN 600 MG: 600 TABLET ORAL at 20:58

## 2020-01-24 RX ADMIN — POTASSIUM CHLORIDE 20 MEQ: 1500 TABLET, EXTENDED RELEASE ORAL at 08:48

## 2020-01-24 RX ADMIN — AMLODIPINE BESYLATE 10 MG: 10 TABLET ORAL at 08:48

## 2020-01-24 RX ADMIN — Medication 1 MG/HR: at 01:21

## 2020-01-24 RX ADMIN — INSULIN LISPRO 6 UNITS: 100 INJECTION, SOLUTION INTRAVENOUS; SUBCUTANEOUS at 12:31

## 2020-01-24 RX ADMIN — FERROUS GLUCONATE 324 MG: 324 TABLET ORAL at 08:49

## 2020-01-24 RX ADMIN — METOPROLOL TARTRATE 50 MG: 50 TABLET, FILM COATED ORAL at 08:48

## 2020-01-24 RX ADMIN — Medication 1 MG/HR: at 08:47

## 2020-01-24 RX ADMIN — FERROUS GLUCONATE 324 MG: 324 TABLET ORAL at 18:43

## 2020-01-24 RX ADMIN — Medication 1 MG/HR: at 18:33

## 2020-01-24 RX ADMIN — APIXABAN 5 MG: 5 TABLET, FILM COATED ORAL at 18:44

## 2020-01-24 RX ADMIN — APIXABAN 5 MG: 5 TABLET, FILM COATED ORAL at 08:49

## 2020-01-24 RX ADMIN — METOPROLOL TARTRATE 50 MG: 50 TABLET, FILM COATED ORAL at 20:58

## 2020-01-24 RX ADMIN — CEFAZOLIN SODIUM 1000 MG: 1 SOLUTION INTRAVENOUS at 08:49

## 2020-01-24 RX ADMIN — ACETAMINOPHEN 650 MG: 325 TABLET ORAL at 18:41

## 2020-01-24 RX ADMIN — TAMSULOSIN HYDROCHLORIDE 0.4 MG: 0.4 CAPSULE ORAL at 18:43

## 2020-01-24 RX ADMIN — ALLOPURINOL 300 MG: 100 TABLET ORAL at 08:48

## 2020-01-24 RX ADMIN — GUAIFENESIN 600 MG: 600 TABLET ORAL at 08:48

## 2020-01-24 NOTE — PROGRESS NOTES
Progress Note - Kee Crest 1955, 75 y o  male MRN: [id number]    Unit/Bed#: E4 -01 Encounter: [id number]    Primary Care Provider: [place] Ren DO   Date and time admitted to hospital: 1/7/2020 11:29 AM        Hypokalemia  Assessment & Plan  Repletion by Nephrology    Anxiety  Assessment & Plan  Ativan PRN    Iron deficiency anemia  Assessment & Plan  S/P one unit of PRBC's - HB stable    Type 2 diabetes mellitus with diabetic neuropathy, without long-term current use of insulin [facility])  Assessment & Plan  Lab Results   Component Value Date    HGBA1C 6 4 (H) 01/07/2020       Recent Labs     01/22/20  2059 01/23/20  0732 01/23/20  1109 01/23/20  1718   POCGLU 267* 92 216* 204*       Blood Sugar Average: Last 72 hrs:  (P) 184 [id number]     SSI and basal bolus insulin    Elevated troponin I level  Assessment & Plan  W/o cp  Suspect type 2 MI from hypoxia vs nonspecific troponin elevation w/underlying CHF and ELZBIETA  ekg stable and paced  Monitor troponins for completeness will consult cardiology    Chronic diastolic (congestive) heart failure (HCC)  Assessment & Plan  Wt Readings from Last 3 Encounters:   01/23/20 98 4 kg (216 lb 14 9 oz)   12/13/19 96 2 kg (212 lb)   12/03/19 97 5 kg (215 lb)     Now with acute exacerbation - On Bumex drip  Repeat labs in the am  CXR with volume overload - Repeat labs in the am  ELBERT w/LVEF 60% but moderate to severe mitral stenosis  Appreciate cardiology recommendations  Anemia  Assessment & Plan  - Hemoccult stools  Tranfuse for HB less than 7   No evidence of melena or blood loss  Repeat in am      Acute renal failure superimposed on stage 3 chronic kidney disease ([facility] [address] )  Assessment & Plan  Suspected elzbieta on ckd  ELZBIETA may be due to illness and poor oral intake    Nephrology consultation  Placed - Clinically volume overloaded  Creatine improved to 2 58    Essential hypertension  Assessment & Plan  Hold cozaar/ continue metoprolol       Type 2 diabetes mellitus with chronic kidney disease, without long-term current use of insulin [facility])  Assessment & Plan  Lab Results   Component Value Date    HGBA1C 6 4 (H) 01/07/2020       Recent Labs     01/22/20 2059 01/23/20  0732 01/23/20  1109 01/23/20  1718   POCGLU 267* 92 216* 204*       Blood Sugar Average: Last 72 hrs:  (P) 184 [id number] iddm on insulin degludec-liraglutide 18 iu daily  Will transition to lantus at 9 iu qhs and start ssi/poc bs given poor appetite  NICOLASA (obstructive sleep apnea)  Assessment & Plan  Continue cpap qhs  Urinary retention  Assessment & Plan  Trial Void, Will D/C Jones    PAF (paroxysmal atrial fibrillation) ([facility])  Assessment & Plan  Ventricular Rate controlled and paced rhythm  Continue lopressor/eliquis    Severe sepsis [facility])  Assessment & Plan  Patient with MSSA Bacteremia from left hallux wound  He remains on Ancef, he has received a PICC line  He did have a ELBERT that was negative for vegetation, he is status post left hallux amputation on 01/08/20/2020 as well as incision and drainage removal of the sesamoid bone on 01/12/2020  This appears to be the nidus of infection  Appreciate Podiatry recommendations  He will continue Ancef through to 01/20/2020  During this time he will need weekly CBCs in creatinine while on intravenous antibiotic    POD 13 today for left hallux amputation  Complete heart block (HCC)  Assessment & Plan  S/p ppm  Monitored on telemetry    Eczema  Assessment & Plan  Stable discrete plaques over back w/silver scaling  Pruritic non painful  Continue op dupixent twice weekly upon d/c  Op f/u with derm  * Acute respiratory failure with hypoxia (HCC)  Assessment & Plan  Acute hypoxemic respiratory failure in the setting of acute on chronic diastolic congestive heart failure   Continue bumex, wean O2 as tolerated     Repeat CXR in the am      [place]  Internal Medicine Progress Note  Patient: Jose Curiel 75 y o  male   MRN: [id number] PCP: Monique Schaffer DO  Unit/Bed#: E4 -01 Encounter: [id number]  Date Of Visit: 01/23/20    Assessment:    Principal Problem:    Acute respiratory failure with hypoxia ([address] )  Active Problems:    Eczema    Complete heart block (HCC)    Severe sepsis (HCC)    PAF (paroxysmal atrial fibrillation) (HCC)    Urinary retention    NICOLASA (obstructive sleep apnea)    Type 2 diabetes mellitus with chronic kidney disease, without long-term current use of insulin (HCC)    Essential hypertension    Acute renal failure superimposed on stage 3 chronic kidney disease (HCC)    Anemia    Chronic diastolic (congestive) heart failure (HCC)    Elevated troponin I level    Type 2 diabetes mellitus with diabetic neuropathy, without long-term current use of insulin (HCC)    Iron deficiency anemia    Anxiety    Hypokalemia      Plan:    · Continue current treatment plan  · Repeat chest x-ray in the morning  · Replete potassium as needed  · Discharge planning       VTE Pharmacologic Prophylaxis:   Pharmacologic: Apixaban (Eliquis)  Mechanical VTE Prophylaxis in Place: Yes    Patient Centered Rounds: I have performed bedside rounds with nursing staff today  Discussions with Specialists or Other Care Team Provider:  Cardiology    Education and Discussions with Family / Patient:  Patient    Time Spent for Care: 30 minutes  More than 50% of total time spent on counseling and coordination of care as described above  Current Length of Stay: 16 day(s)    Current Patient Status: Inpatient   Certification Statement: The patient will continue to require additional inpatient hospital stay due to Continue current medical management    Discharge Plan / Estimated Discharge Date:  48 hours    Code Status: Level 1 - Full Code      Subjective:   Patient seen examined, no chest pain shortness of breath difficulty breathing      He feels much improved    A complete and comprehensive 14 point organ system review has been performed and all other systems are negative other than stated above  Objective:     Vitals:   Temp (24hrs), Av 2 °F (36 8 °C), Min:97 8 °F (36 6 °C), Max:98 6 °F (37 °C)    Temp:  [97 8 °F (36 6 °C)-98 6 °F (37 °C)] 98 1 °F (36 7 °C)  HR:  [62-85] 76  Resp:  [20] 20  BP: (144-150)/(64-68) 148/68  SpO2:  [96 %-97 %] 97 %  Body mass index is 31 41 kg/m²  Input and Output Summary (last 24 hours): Intake/Output Summary (Last 24 hours) at 2020  Last data filed at 2020 1601  Gross per 24 hour   Intake 320 ml   Output 2920 ml   Net -2600 ml       Physical Exam:     General: well appearing, no acute distress  HEENT: atraumatic, PERRLA, moist mucosa, normal pharynx, normal tonsils and adenoids, normal tongue, no fluid in sinuses  Neck: Trachea midline, no carotid bruit, no masses  Respiratory: normal chest wall expansion, CTA B, no r/r/w, no rubs  Cardiovascular: RRR, no m/r/g, Normal S1 and S2  Abdomen: Soft, non-tender, non-distended, normal bowel sounds in all quadrants, no hepatosplenomegaly, no tympany  Rectal: deferred  Musculoskeletal: normal ROM in upper and lower extremities  Integumentary: warm, dry, and pink, with no rash, purpura, or petechia  Heme/Lymph: no lymphadenopathy, no bruises  Neurological: Cranial Nerves II-XII grossly intact, no tics, normal sensation to pressure and light touch  Psychiatric: cooperative with normal mood, affect, and cognition      Additional Data:     Labs:    Results from last 7 days   Lab Units 20  0626   WBC Thousand/uL 11 59*   HEMOGLOBIN g/dL 8 1*   HEMATOCRIT % 24 8*   PLATELETS Thousands/uL 399*   NEUTROS PCT % 68   LYMPHS PCT % 12*   MONOS PCT % 12   EOS PCT % 6     Results from last 7 days   Lab Units 20  0626   POTASSIUM mmol/L 3 3*   CHLORIDE mmol/L 97*   CO2 mmol/L 26   BUN mg/dL 128*   CREATININE mg/dL 2 58*   CALCIUM mg/dL 8 8           * I Have Reviewed All Lab Data Listed Above  * Additional Pertinent Lab Tests Reviewed:  All Labs For Current Hospital Admission Reviewed    Imaging:    CXR ordered    Recent Cultures (last 7 days):           Last 24 Hours Medication List:     Current Facility-Administered Medications:  acetaminophen 650 mg Oral Q6H PRN Melissa Lauraron, DO    allopurinol 300 mg Oral QAM EDWIN ChakrabortyM    amLODIPine 10 mg Oral Daily Su Bernard, MOE    apixaban 5 mg Oral BID Melissa Ambron, DO    bumetanide 1 mg/hr Intravenous Continuous Rujul Hinds, DO Last Rate: 1 mg/hr (01/23/20 0823)   cefazolin 1,000 mg Intravenous Q8H PAT Rich Last Rate: 1,000 mg (01/23/20 1803)   docusate sodium 100 mg Oral BID Melissa Lauraron, DO    ferrous gluconate 324 mg Oral BID AC Melissa Sanchezron, DO    guaiFENesin 600 mg Oral BID Marquise Hamilton, EDWINM    insulin glargine 22 Units Subcutaneous HS Melissa Alonso, DO    insulin lispro 2-12 Units Subcutaneous TID AC Camarillohaile Hamilton, DPM    ipratropium-albuterol 3 mL Nebulization Q6H PRN Marquise Hamilton, EDWINM    LORazepam 0 5 mg Oral Q8H PRN Melissa Alonso, DO    melatonin 6 mg Oral HS [place] Horn, EDWINM    metoprolol tartrate 50 mg Oral Q12H [facility], EDWINM    oxyCODONE 5 mg Oral Q4H PRN Marquise Hamilton, EDWINM    potassium chloride 20 mEq Oral BID Rujul Hinds, DO    pravastatin 40 mg Oral Daily With Peace Garcia Cha DPM    tamsulosin 0 4 mg Oral Daily With Modesta Rasmussen DPM         Today, Patient Was Seen By: Sofya Benavides DO    ** Please Note: This note has been constructed using a voice recognition system   **

## 2020-01-24 NOTE — ASSESSMENT & PLAN NOTE
Patient with MSSA Bacteremia from left hallux wound  He remains on Ancef, he has received a PICC line  He did have a ELBERT that was negative for vegetation, he is status post left hallux amputation on 01/08/20/2020 as well as incision and drainage removal of the sesamoid bone on 01/12/2020  This appears to be the nidus of infection  Appreciate Podiatry recommendations  He will continue Ancef through to 01/20/2020  During this time he will need weekly CBCs in creatinine while on intravenous antibiotic    POD 13 today for left hallux amputation

## 2020-01-24 NOTE — ASSESSMENT & PLAN NOTE
Lab Results   Component Value Date    HGBA1C 6 4 (H) 01/07/2020       Recent Labs     01/22/20 2059 01/23/20  0732 01/23/20  1109 01/23/20  1718   POCGLU 267* 92 216* 204*       Blood Sugar Average: Last 72 hrs:  (P) 184 [id number] iddm on insulin degludec-liraglutide 18 iu daily  Will transition to lantus at 9 iu qhs and start ssi/poc bs given poor appetite

## 2020-01-24 NOTE — PLAN OF CARE
Problem: Potential for Falls  Goal: Patient will remain free of falls  Description  INTERVENTIONS:  - Assess patient frequently for physical needs  -  Identify cognitive and physical deficits and behaviors that affect risk of falls    -  [place] fall precautions as indicated by assessment   - Educate patient/family on patient safety including physical limitations  - Instruct patient to call for assistance with activity based on assessment  - Modify environment to reduce risk of injury  - Consider OT/PT consult to assist with strengthening/mobility  Outcome: Progressing     Problem: PAIN - ADULT  Goal: Verbalizes/displays adequate comfort level or baseline comfort level  Description  Interventions:  - Encourage patient to monitor pain and request assistance  - Assess pain using appropriate pain scale  - Administer analgesics based on type and severity of pain and evaluate response  - Implement non-pharmacological measures as appropriate and evaluate response  - Consider cultural and social influences on pain and pain management  - Notify physician/advanced practitioner if interventions unsuccessful or patient reports new pain  Outcome: Progressing     Problem: INFECTION - ADULT  Goal: Absence or prevention of progression during hospitalization  Description  INTERVENTIONS:  - Assess and monitor for signs and symptoms of infection  - Monitor lab/diagnostic results  - Monitor all insertion sites, i e  indwelling lines, tubes, and drains  - Administer medications as ordered  - Instruct and encourage patient and family to use good hand hygiene technique  - Identify and instruct in appropriate isolation precautions for identified infection/condition   Outcome: Progressing     Problem: SAFETY ADULT  Goal: Maintain or return to baseline ADL function  Description  INTERVENTIONS:  -  Assess patient's ability to carry out ADLs; assess patient's baseline for ADL function and identify physical deficits which impact ability to perform ADLs (bathing, care of mouth/teeth, toileting, grooming, dressing, etc )  - Assess/evaluate cause of self-care deficits   - Assess range of motion  - Assess patient's mobility; develop plan if impaired  - Assess patient's need for assistive devices and provide as appropriate  - Encourage maximum independence but intervene and supervise when necessary  - Involve family in performance of ADLs  - Assess for home care needs following discharge   - Consider OT consult to assist with ADL evaluation and planning for discharge  - Provide patient education as appropriate  Outcome: Progressing  Goal: Maintain or return mobility status to optimal level  Description  INTERVENTIONS:  - Assess patient's baseline mobility status (ambulation, transfers, stairs, etc )    - Identify cognitive and physical deficits and behaviors that affect mobility  - Identify mobility aids required to assist with transfers and/or ambulation (gait belt, sit-to-stand, lift, walker, cane, etc )  - [place] fall precautions as indicated by assessment  - Record patient progress and toleration of activity level on Mobility SBAR; progress patient to next Phase/Stage  - Instruct patient to call for assistance with activity based on assessment  - Consider rehabilitation consult to assist with strengthening/weightbearing, etc   Outcome: Progressing     Problem: DISCHARGE PLANNING  Goal: Discharge to home or other facility with appropriate resources  Description  INTERVENTIONS:  - Identify barriers to discharge w/patient and caregiver  - Arrange for needed discharge resources and transportation as appropriate  - Identify discharge learning needs (meds, wound care, etc )  - Arrange for interpretive services to assist at discharge as needed  - Refer to Case Management Department for coordinating discharge planning if the patient needs post-hospital services based on physician/advanced practitioner order or complex needs related to functional status, cognitive ability, or social support system  Outcome: Progressing     Problem: Knowledge Deficit  Goal: Patient/family/caregiver demonstrates understanding of disease process, treatment plan, medications, and discharge instructions  Description  Complete learning assessment and assess knowledge base    Interventions:  - Provide teaching at level of understanding  - Provide teaching via preferred learning methods  Outcome: Progressing     Problem: CARDIOVASCULAR - ADULT  Goal: Maintains optimal cardiac output and hemodynamic stability  Description  INTERVENTIONS:  - Monitor I/O, vital signs and rhythm  - Monitor for S/S and trends of decreased cardiac output  - Administer and titrate ordered vasoactive medications to optimize hemodynamic stability  - Assess quality of pulses, skin color and temperature  - Assess for signs of decreased coronary artery perfusion  - Instruct patient to report change in severity of symptoms  Outcome: Progressing  Goal: Absence of cardiac dysrhythmias or at baseline rhythm  Description  INTERVENTIONS:  - Continuous cardiac monitoring, vital signs, obtain 12 lead EKG if ordered  - Administer antiarrhythmic and heart rate control medications as ordered  - Monitor electrolytes and administer replacement therapy as ordered  Outcome: Progressing     Problem: RESPIRATORY - ADULT  Goal: Achieves optimal ventilation and oxygenation  Description  INTERVENTIONS:  - Assess for changes in respiratory status  - Assess for changes in mentation and behavior  - Position to facilitate oxygenation and minimize respiratory effort  - Oxygen administered by appropriate delivery if ordered  - Initiate smoking cessation education as indicated  - Encourage broncho-pulmonary hygiene including cough, deep breathe, Incentive Spirometry  - Assess the need for suctioning and aspirate as needed  - Assess and instruct to report SOB or any respiratory difficulty  - Respiratory Therapy support as indicated  Outcome: Progressing     Problem: Prexisting or High Potential for Compromised Skin Integrity  Goal: Skin integrity is maintained or improved  Description  INTERVENTIONS:  - Identify patients at risk for skin breakdown  - Assess and monitor skin integrity  - Assess and monitor nutrition and hydration status  - Monitor labs   - Assess for incontinence   - Turn and reposition patient  - Assist with mobility/ambulation  - Relieve pressure over bony prominences  - Avoid friction and shearing  - Provide appropriate hygiene as needed including keeping skin clean and dry  - Evaluate need for skin moisturizer/barrier cream  - Collaborate with interdisciplinary team   - Patient/family teaching  - Consider wound care consult   Outcome: Progressing     Problem: Nutrition/Hydration-ADULT  Goal: Nutrient/Hydration intake appropriate for improving, restoring or maintaining nutritional needs  Description  Monitor and assess patient's nutrition/hydration status for malnutrition  Collaborate with interdisciplinary team and initiate plan and interventions as ordered  Monitor patient's weight and dietary intake as ordered or per policy  Utilize nutrition screening tool and intervene as necessary  Determine patient's food preferences and provide high-protein, high-caloric foods as appropriate       INTERVENTIONS:  - Monitor oral intake, urinary output, labs, and treatment plans  - Assess nutrition and hydration status and recommend course of action  - Evaluate amount of meals eaten  - Assist patient with eating if necessary   - Allow adequate time for meals  - Recommend/ encourage appropriate diets, oral nutritional supplements, and vitamin/mineral supplements  - Order, calculate, and assess calorie counts as needed  - Recommend, monitor, and adjust tube feedings and TPN/PPN based on assessed needs  - Assess need for intravenous fluids  - Provide specific nutrition/hydration education as appropriate  - Include patient/family/caregiver in decisions related to nutrition  Outcome: Progressing     Problem: METABOLIC, FLUID AND ELECTROLYTES - ADULT  Goal: Glucose maintained within target range  Description  INTERVENTIONS:  - Monitor Blood Glucose as ordered  - Assess for signs and symptoms of hyperglycemia and hypoglycemia  - Administer ordered medications to maintain glucose within target range  - Assess nutritional intake and initiate nutrition service referral as needed  Outcome: Progressing     Problem: SKIN/TISSUE INTEGRITY - ADULT  Goal: Skin integrity remains intact  Description  INTERVENTIONS  - Identify patients at risk for skin breakdown  - Assess and monitor skin integrity  - Assess and monitor nutrition and hydration status  - Monitor labs (i e  albumin)  - Assess for incontinence   - Turn and reposition patient  - Assist with mobility/ambulation  - Relieve pressure over bony prominences  - Avoid friction and shearing  - Provide appropriate hygiene as needed including keeping skin clean and dry  - Evaluate need for skin moisturizer/barrier cream  - Collaborate with interdisciplinary team (i e  Nutrition, Rehabilitation, etc )   - Patient/family teaching  Outcome: Progressing  Goal: Incision(s), wounds(s) or drain site(s) healing without S/S of infection  Description  INTERVENTIONS  - Assess and document risk factors for skin impairment   - Assess and document dressing, incision, wound bed, drain sites and surrounding tissue  - Consider nutrition services referral as needed  - Oral mucous membranes remain intact  - Provide patient/ family education  Outcome: Progressing  Goal: Oral mucous membranes remain intact  Description  INTERVENTIONS  - Assess oral mucosa and hygiene practices  - Implement preventative oral hygiene regimen  - Implement oral medicated treatments as ordered  - Initiate Nutrition services referral as needed  Outcome: Progressing

## 2020-01-24 NOTE — ASSESSMENT & PLAN NOTE
Suspected elzbieta on ckd  ELZBIETA may be due to illness and poor oral intake    Nephrology consultation  Placed - Clinically volume overloaded      Creatine improved to 2 58

## 2020-01-24 NOTE — SOCIAL WORK
Pt's wife called CM and is requesting a knee scooter  Rx obtained from Dr Jacqui Morse -- CM sent to [facility]  They are able to obtain knee scooter but this is not covered by Medicare  Per [employer] $85 per month and the pt will need to sign an Advanced Beneficary Notice (ABN) which states they are knowledgeable it will be denied and they are financially responsible"   CM informed pt's wife who will discuss with her family and get back to [facility] if she wants the scooter to arrange transport  CM planning for possible d/c soon  Clarified with [facility] that pt's dose of IV Cefazolin has changed since the time orginial referral was sent on 1/5  CM ECINed new Rx for Cefazolin 1gm every 8 hours until 2/6/2020  Also sent Rx for weekly CBC and BMP while on IV ABX  Per Dr Jacqui Morse, plan for possible d/c on Monday -- pt still on IV Bumex  Wife also states she prefers d/c on Monday  CM following

## 2020-01-24 NOTE — PROGRESS NOTES
Progress Note - Podiatry  [place] 75 y o  male MRN: [id number]  Unit/Bed#: E4 -01 Encounter: [id number]    Assessment/Plan:  1  S/p I&D with removal of sesamoid on 01/12/20 and s/p left hallux amputation on 01/08/20 due to underlying clinical OM, abscess, and possible infectious tenosynovitis   2  Sepsis - POA  3  Bacteremia likely secondary to #1  4  DM type 2     -L hallux amputation site remains well-coapted, all sutures intact, no acute signs of infection  -NWB RLE  -continue elvevation b/l LE  -IV abx per ID  -rest of care per primary team  -stable for d/c from Podiatric perspective    Subjective/Objective   Chief Complaint:   Chief Complaint   Patient presents with    Fatigue     Pt with multiple complaints: sore throat, diarrhea, fatigue, fevers, tylenol at 0930  -ill contacts  Subjective: 75 y o  y/o male was seen and evaluated at bedside  Blood pressure 150/67, pulse 60, temperature 97 6 °F (36 4 °C), temperature source Tympanic, resp  rate 20, weight 98 2 kg (216 lb 7 9 oz), SpO2 100 %  ,Body mass index is 31 34 kg/m²  Invasive Devices     Peripherally Inserted Central Catheter Line            PICC Line 35/34/19 Right Basilic 10 days                Physical Exam:   General: Alert, cooperative and no distress  Lungs: Non labored breathing  Heart: Positive S1, S2  Abdomen: Soft, non-tender  Extremity:     Neurovascular status gross motor function baseline    All sutures intact, no acute signs of infection, no erythema, no cellulitis, no martir pus, no malodor

## 2020-01-24 NOTE — ASSESSMENT & PLAN NOTE
Acute hypoxemic respiratory failure in the setting of acute on chronic diastolic congestive heart failure   Continue bumex, wean O2 as tolerated     Repeat CXR in the am

## 2020-01-24 NOTE — PROGRESS NOTES
Progress Note - Germaine Esquivel 75 y o  male MRN: [id number]    Unit/Bed#: E4 -01 Encounter: [id number]  Subjective:   No chest pain    Breathing better  Still with edema  (L>RLE)    No palpitations or dizziness  Objective:   Vitals: Blood pressure 146/68, pulse 87, temperature 97 6 °F (36 4 °C), temperature source Tympanic, resp  rate 20, weight 98 2 kg (216 lb 7 9 oz), SpO2 97 %  ,Body mass index is 31 34 kg/m²  Physical exam:  Lungs-decreased breath sounds at right base    No rales, rhonchi or wheezes  Heart-Regular with grade 2-3 systolic murmur at the base  No diastolic murmur rub or gallop  Abdomen-soft nontender without mass organomegaly  Extremities -3 +left lower extremity edema with 2+ right lower extremity edema    Assessment:  1  Acute on chronic diastolic congestive heart failure  On Bumex drip  Still somewhat fluid overloaded  Will discuss with Nephrology whether we can tried metolazone  Labs not back from today  2  Moderate mitral stenosis  Stable from previous evaluation  3  Mild aortic stenosis  Stable from previous evaluation  4  History complete heart block with permanent pacemaker in situ  5  Hyperlipidemia  Historically good control on simvastatin as an outpatient  6  Paroxysmal atrial fibrillation  On Eliquis as outpatient  This did occur with acute illness in the past   We anticipate possibly stopping Eliquis at some point if interrogations continue this shown no atrial fibrillation  7  HTN-historically good control on beta-blocker, ARB and calcium channel blockers outpatient-blood pressure somewhat elevated here but off ARB at this time in light of renal issues  8  MSSA bacteremia from left hallux wound  Management as per Infectious Disease and Internal Medicine  Status post left hallux amputation this admission  9  Chronic kidney disease -creatinine improving with baseline usually about 1 6  10  Diabetes mellitus-mgt as per primary team      Plan:  Continue bumex ggt  Dortha Plough Will discuss metolazone with renal after labs back  continue beta-blocker and calcium channel blocker  Continue statin  Calin Ramos MD

## 2020-01-24 NOTE — PLAN OF CARE
Problem: Potential for Falls  Goal: Patient will remain free of falls  Description  INTERVENTIONS:  - Assess patient frequently for physical needs  -  Identify cognitive and physical deficits and behaviors that affect risk of falls    -  [place] fall precautions as indicated by assessment   - Educate patient/family on patient safety including physical limitations  - Instruct patient to call for assistance with activity based on assessment  - Modify environment to reduce risk of injury  - Consider OT/PT consult to assist with strengthening/mobility  Outcome: Progressing     Problem: PAIN - ADULT  Goal: Verbalizes/displays adequate comfort level or baseline comfort level  Description  Interventions:  - Encourage patient to monitor pain and request assistance  - Assess pain using appropriate pain scale  - Administer analgesics based on type and severity of pain and evaluate response  - Implement non-pharmacological measures as appropriate and evaluate response  - Consider cultural and social influences on pain and pain management  - Notify physician/advanced practitioner if interventions unsuccessful or patient reports new pain  Outcome: Progressing     Problem: INFECTION - ADULT  Goal: Absence or prevention of progression during hospitalization  Description  INTERVENTIONS:  - Assess and monitor for signs and symptoms of infection  - Monitor lab/diagnostic results  - Monitor all insertion sites, i e  indwelling lines, tubes, and drains  - Administer medications as ordered  - Instruct and encourage patient and family to use good hand hygiene technique  - Identify and instruct in appropriate isolation precautions for identified infection/condition   Outcome: Progressing     Problem: SAFETY ADULT  Goal: Maintain or return to baseline ADL function  Description  INTERVENTIONS:  -  Assess patient's ability to carry out ADLs; assess patient's baseline for ADL function and identify physical deficits which impact ability to perform ADLs (bathing, care of mouth/teeth, toileting, grooming, dressing, etc )  - Assess/evaluate cause of self-care deficits   - Assess range of motion  - Assess patient's mobility; develop plan if impaired  - Assess patient's need for assistive devices and provide as appropriate  - Encourage maximum independence but intervene and supervise when necessary  - Involve family in performance of ADLs  - Assess for home care needs following discharge   - Consider OT consult to assist with ADL evaluation and planning for discharge  - Provide patient education as appropriate  Outcome: Progressing  Goal: Maintain or return mobility status to optimal level  Description  INTERVENTIONS:  - Assess patient's baseline mobility status (ambulation, transfers, stairs, etc )    - Identify cognitive and physical deficits and behaviors that affect mobility  - Identify mobility aids required to assist with transfers and/or ambulation (gait belt, sit-to-stand, lift, walker, cane, etc )  - [place] fall precautions as indicated by assessment  - Record patient progress and toleration of activity level on Mobility SBAR; progress patient to next Phase/Stage  - Instruct patient to call for assistance with activity based on assessment  - Consider rehabilitation consult to assist with strengthening/weightbearing, etc   Outcome: Progressing     Problem: DISCHARGE PLANNING  Goal: Discharge to home or other facility with appropriate resources  Description  INTERVENTIONS:  - Identify barriers to discharge w/patient and caregiver  - Arrange for needed discharge resources and transportation as appropriate  - Identify discharge learning needs (meds, wound care, etc )  - Arrange for interpretive services to assist at discharge as needed  - Refer to Case Management Department for coordinating discharge planning if the patient needs post-hospital services based on physician/advanced practitioner order or complex needs related to functional status, cognitive ability, or social support system  Outcome: Progressing     Problem: Knowledge Deficit  Goal: Patient/family/caregiver demonstrates understanding of disease process, treatment plan, medications, and discharge instructions  Description  Complete learning assessment and assess knowledge base    Interventions:  - Provide teaching at level of understanding  - Provide teaching via preferred learning methods  Outcome: Progressing     Problem: CARDIOVASCULAR - ADULT  Goal: Maintains optimal cardiac output and hemodynamic stability  Description  INTERVENTIONS:  - Monitor I/O, vital signs and rhythm  - Monitor for S/S and trends of decreased cardiac output  - Administer and titrate ordered vasoactive medications to optimize hemodynamic stability  - Assess quality of pulses, skin color and temperature  - Assess for signs of decreased coronary artery perfusion  - Instruct patient to report change in severity of symptoms  Outcome: Progressing  Goal: Absence of cardiac dysrhythmias or at baseline rhythm  Description  INTERVENTIONS:  - Continuous cardiac monitoring, vital signs, obtain 12 lead EKG if ordered  - Administer antiarrhythmic and heart rate control medications as ordered  - Monitor electrolytes and administer replacement therapy as ordered  Outcome: Progressing     Problem: RESPIRATORY - ADULT  Goal: Achieves optimal ventilation and oxygenation  Description  INTERVENTIONS:  - Assess for changes in respiratory status  - Assess for changes in mentation and behavior  - Position to facilitate oxygenation and minimize respiratory effort  - Oxygen administered by appropriate delivery if ordered  - Initiate smoking cessation education as indicated  - Encourage broncho-pulmonary hygiene including cough, deep breathe, Incentive Spirometry  - Assess the need for suctioning and aspirate as needed  - Assess and instruct to report SOB or any respiratory difficulty  - Respiratory Therapy support as indicated  Outcome: Progressing     Problem: Prexisting or High Potential for Compromised Skin Integrity  Goal: Skin integrity is maintained or improved  Description  INTERVENTIONS:  - Identify patients at risk for skin breakdown  - Assess and monitor skin integrity  - Assess and monitor nutrition and hydration status  - Monitor labs   - Assess for incontinence   - Turn and reposition patient  - Assist with mobility/ambulation  - Relieve pressure over bony prominences  - Avoid friction and shearing  - Provide appropriate hygiene as needed including keeping skin clean and dry  - Evaluate need for skin moisturizer/barrier cream  - Collaborate with interdisciplinary team   - Patient/family teaching  - Consider wound care consult   Outcome: Progressing     Problem: Nutrition/Hydration-ADULT  Goal: Nutrient/Hydration intake appropriate for improving, restoring or maintaining nutritional needs  Description  Monitor and assess patient's nutrition/hydration status for malnutrition  Collaborate with interdisciplinary team and initiate plan and interventions as ordered  Monitor patient's weight and dietary intake as ordered or per policy  Utilize nutrition screening tool and intervene as necessary  Determine patient's food preferences and provide high-protein, high-caloric foods as appropriate       INTERVENTIONS:  - Monitor oral intake, urinary output, labs, and treatment plans  - Assess nutrition and hydration status and recommend course of action  - Evaluate amount of meals eaten  - Assist patient with eating if necessary   - Allow adequate time for meals  - Recommend/ encourage appropriate diets, oral nutritional supplements, and vitamin/mineral supplements  - Order, calculate, and assess calorie counts as needed  - Recommend, monitor, and adjust tube feedings and TPN/PPN based on assessed needs  - Assess need for intravenous fluids  - Provide specific nutrition/hydration education as appropriate  - Include patient/family/caregiver in decisions related to nutrition  Outcome: Progressing     Problem: METABOLIC, FLUID AND ELECTROLYTES - ADULT  Goal: Glucose maintained within target range  Description  INTERVENTIONS:  - Monitor Blood Glucose as ordered  - Assess for signs and symptoms of hyperglycemia and hypoglycemia  - Administer ordered medications to maintain glucose within target range  - Assess nutritional intake and initiate nutrition service referral as needed  Outcome: Progressing     Problem: SKIN/TISSUE INTEGRITY - ADULT  Goal: Skin integrity remains intact  Description  INTERVENTIONS  - Identify patients at risk for skin breakdown  - Assess and monitor skin integrity  - Assess and monitor nutrition and hydration status  - Monitor labs (i e  albumin)  - Assess for incontinence   - Turn and reposition patient  - Assist with mobility/ambulation  - Relieve pressure over bony prominences  - Avoid friction and shearing  - Provide appropriate hygiene as needed including keeping skin clean and dry  - Evaluate need for skin moisturizer/barrier cream  - Collaborate with interdisciplinary team (i e  Nutrition, Rehabilitation, etc )   - Patient/family teaching  Outcome: Progressing  Goal: Incision(s), wounds(s) or drain site(s) healing without S/S of infection  Description  INTERVENTIONS  - Assess and document risk factors for skin impairment   - Assess and document dressing, incision, wound bed, drain sites and surrounding tissue  - Consider nutrition services referral as needed  - Oral mucous membranes remain intact  - Provide patient/ family education  Outcome: Progressing  Goal: Oral mucous membranes remain intact  Description  INTERVENTIONS  - Assess oral mucosa and hygiene practices  - Implement preventative oral hygiene regimen  - Implement oral medicated treatments as ordered  - Initiate Nutrition services referral as needed  Outcome: Progressing

## 2020-01-24 NOTE — PROGRESS NOTES
Progress Note - Nephrology   Pedro Gilbert 75 y o  male MRN: [id number]  Unit/Bed#: E4 -01 Encounter: [id number]      Assessment / Plan:  1  Acute kidney injury, POA, suspecting likely cardiorenal syndrome, could be component of obstructive uropathy given urinary retention +/- ATN  -ojeda in place  -Renal u/s shows echogenic kidneys  -UA with microscopy shows trace ketones, large blood, greater than 300 protein, 4-10 WBCs, 4-10 rbc's, moderate bacteria with 0-1 fine granular casts, 1% eosinophils noted   Granular casts are suggestive of ATN  -sCr improving daily, down to 2 58 from peak 2 98  Today's BMP is pending   -last BUN elevated to 128  No signs of uremia  No urgent RRT needs  -monitor renal indices on bumex gtt per cardiology   2  Hyponatremia, suspecting secondary to volume overload plus renal dysfunction - dilutional, improving on IV bumex gtt   3  Hypokalemia-last potassium 3 3, likely diuretic-induced, continue b i d  Standing potassium chloride supplementation, monitor potassium/Mag levels  Did receive additional repletion yesterday  4  Chronic kidney disease 3, previous baseline creatinine in the low 1s with associated nephrotic range proteinuria, follows with Dr Marcell Bray  5  Volume overload - weight has significantly improved on bumex gtt per cardiology  6  MSSA bacteremia and sepsis d/t left hallux wound - on Ancef through 2/6 as per Infectious Disease  7  Urinary retention - monitor bladder scan, Ojeda out  8  HTN - BP acceptable for level of ELZBIETA on CKD   Continue amlodipine 10 mg p o  Daily, metoprolol 50 mg p o  Q 12 hours with hold parameters   Also on Flomax     9  Anemia, iron deficiency- Hgb 9 4-->8s and stable, on oral iron  Avoiding venofer in light of bacteremia  Monitor CBC  Transfuse prn Hgb < 7   Iron low at 33, ferritin 94, iron sat 11%, TIBC 314        Subjective:   He denies any chest pain or shortness of breath  He thinks his leg edema is improving    No complaints  He still on a Bumex drip  Objective:     Vitals: Blood pressure 146/68, pulse 87, temperature 97 6 °F (36 4 °C), temperature source Tympanic, resp  rate 20, weight 98 2 kg (216 lb 7 9 oz), SpO2 97 %  ,Body mass index is 31 34 kg/m²  Temp (24hrs), Av 4 °F (36 9 °C), Min:97 6 °F (36 4 °C), Max:98 9 °F (37 2 °C)      Weight (last 2 days)     Date/Time   Weight    20 0600   98 2 (216 49)    20 0600   98 4 (216 93)                Intake/Output Summary (Last 24 hours) at 2020 1357  Last data filed at 2020 0700  Gross per 24 hour   Intake    Output 2320 ml   Net -2320 ml     I/O last 24 hours: In: 320 [P O :320]  Out: 2720 [Urine:2720]        Physical Exam:   Physical Exam   Constitutional: He appears well-developed and well-nourished  No distress  HENT:   Head: Normocephalic and atraumatic  Mouth/Throat: No oropharyngeal exudate  Eyes: Right eye exhibits no discharge  Left eye exhibits no discharge  No scleral icterus  Neck: Neck supple  No thyromegaly present  Cardiovascular: Normal rate, regular rhythm and normal heart sounds  Pulmonary/Chest: Effort normal and breath sounds normal  He has no wheezes  He has no rales  Abdominal: Soft  Bowel sounds are normal  He exhibits no distension  There is no tenderness  Musculoskeletal: Normal range of motion  He exhibits edema (b/l LE pitting)  Lymphadenopathy:     He has no cervical adenopathy  Neurological: He is alert  awake   Skin: Skin is warm and dry  No rash noted  He is not diaphoretic  Psychiatric: He has a normal mood and affect  His behavior is normal    Vitals reviewed        Invasive Devices     Peripherally Inserted Central Catheter Line            PICC Line  Right Basilic 11 days                Medications:    Scheduled Meds:  Current Facility-Administered Medications:  acetaminophen 650 mg Oral Q6H PRN Melissa Ambron, DO    allopurinol 300 mg Oral QAM Addi Bisi, DPM    amLODIPine 10 mg Oral Daily [place], [place]    apixaban 5 mg Oral BID Melissa Ambron, DO    bumetanide 1 mg/hr Intravenous Continuous Rujul Hinds, DO Last Rate: 1 mg/hr (01/24/20 0847)   cefazolin 1,000 mg Intravenous Q8H PAT Rich Last Rate: 1,000 mg (01/24/20 0849)   docusate sodium 100 mg Oral BID Melissa Ambron, DO    ferrous gluconate 324 mg Oral BID AC Melissa Ambron, DO    guaiFENesin 600 mg Oral BID Delorise Dial, DPM    insulin glargine 22 Units Subcutaneous HS Melissa Ambron, DO    insulin lispro 2-12 Units Subcutaneous TID AC Delorise Dial, DPM    ipratropium-albuterol 3 mL Nebulization Q6H PRN Delorise Dial, DPM    LORazepam 0 5 mg Oral Q8H PRN Melissa Ambron, DO    melatonin 6 mg Oral HS Delorise Dial, DPM    metoprolol tartrate 50 mg Oral Q12H [facility] Delorise Dial, DPM    oxyCODONE 5 mg Oral Q4H PRN Delorise Dial, DPM    potassium chloride 20 mEq Oral BID Rujul Hinds, DO    pravastatin 40 mg Oral Daily With Keyla Raven Parks, DPM    tamsulosin 0 4 mg Oral Daily With [facility] Kasey, DPM        PRN Meds:   acetaminophen    ipratropium-albuterol    LORazepam    oxyCODONE    Continuous Infusions:  bumetanide 1 mg/hr Last Rate: 1 mg/hr (01/24/20 0847)           LAB RESULTS:      Results from last 7 days   Lab Units 01/23/20  0626 01/22/20  0505 01/21/20  0542 01/20/20  0617 01/20/20  0100 01/19/20  1825 01/19/20  1211 01/19/20  0439   WBC Thousand/uL 11 59* 10 18* 12 20*  --   --   --   --  14 89*   HEMOGLOBIN g/dL 8 1* 7 5* 7 1*  --   --   --   --  9 4*   HEMATOCRIT % 24 8* 22 9* 21 5*  --   --   --   --  28 7*   PLATELETS Thousands/uL 399* 379 394*  --   --   --   --  406*   NEUTROS PCT % 68 70 73  --   --   --   --   --    LYMPHS PCT % 12* 10* 10*  --   --   --   --   --    MONOS PCT % 12 13* 11  --   --   --   --   --    EOS PCT % 6 6 5  --   --   --   --   --    POTASSIUM mmol/L 3 3* 3 3* 3 4* 3 6 3 7 3 7 3 7 3 7   CHLORIDE mmol/L 97* 98* 98* 95* 97* 96* 96* 95*   CO2 mmol/L 26 23 22 21 22 23 24 23   BUN mg/dL 128* 122* 118* 110* 114* 110* 108* 102*   CREATININE mg/dL 2 58* 2 75* 2 87* 2 96* 2 98* 2 91* 2 90* 2 77*   CALCIUM mg/dL 8 8 8 4 8 3 8 3 8 0* 8 1* 8 2* 8 3   MAGNESIUM mg/dL  --  2 3  --   --   --   --   --   --        CUTURES:  Lab Results   Component Value Date    BLOODCX No Growth After 5 Days  01/09/2020    BLOODCX No Growth After 5 Days  01/09/2020    BLOODCX Staphylococcus aureus (A) 01/07/2020    BLOODCX Staphylococcus aureus (A) 01/07/2020    BLOODCX No Growth After 5 Days  05/15/2019    BLOODCX No Growth After 5 Days  05/15/2019    BLOODCX No Growth After 5 Days  05/02/2019    BLOODCX No Growth After 5 Days  05/02/2019                 Portions of the record may have been created with voice recognition software  Occasional wrong word or "sound a like" substitutions may have occurred due to the inherent limitations of voice recognition software  Read the chart carefully and recognize, using context, where substitutions have occurred  If you have any questions, please contact the dictating provider

## 2020-01-24 NOTE — ASSESSMENT & PLAN NOTE
Lab Results   Component Value Date    HGBA1C 6 4 (H) 01/07/2020       Recent Labs     01/22/20 2059 01/23/20  0732 01/23/20  1109 01/23/20  1718   POCGLU 267* 92 216* 204*       Blood Sugar Average: Last 72 hrs:  (P) 184 [id number]     SSI and basal bolus insulin

## 2020-01-24 NOTE — PROGRESS NOTES
Progress Note - Infectious Disease   Meme Kim 75 y o  male MRN: [id number]  Unit/Bed#: E4 -01 Encounter: [id number]      Impression/Plan:  1  Sepsis   POA   Fever and leukocytosis   Likely secondary to MSSA bacteremia with left hallux wound and underlying osteopmyelitis as presumed source  No other clear source appreciated  Despite being systemically ill the patient has been hemodynamically stable  Repeat blood cultures were negative after 5 days  TTE and ELBERT were negative for valvlular vegetation and pacemaker lead abnormalities  Patient now with evidence of CHF on chest xray  He is on BiPAP support at night and is on a Bumex drip   -antibiotic as below  -monitor CBC and BMP  -monitor vitals  -supportive care     2  Maritza Fell in both sets of patient's initial blood cultures  Suspect patient's left great toe ulcer was source   Patient does have an implanted pacemaker  TTE and ELBERT were negative  Repeat blood cultures were clear after 5 days  Patient is currently receiving IV cefazolin and is tolerating without difficulty  He will complete 4 weeks of IV antibiotic treatment, through 2/6/2020   -continue IV cefazolin, dosed for renal function, through 2/6/2020 for 4 weeks of antibiotic treatment  -remove PICC after patient's final dose of IV antibiotics on 2/6/2020  -weekly CBCD and BMP while on IV antibiotics  -patient will require surveillance blood cultures two weeks after completion of antibiotic treatment  -antibiotic and lab scripts dropped off in patient chart on 1/15/2020      3  Left hallux ulceration   With osteomyelitis as his wound does probe to bone and an x-ray of the left foot was suggestive of bony erosion at the 1st proximal phalanx   Purulence noted during initial podiatry wound exam, concern for possible infectious tenosynovitis   I suspect this was the source of patient's bacteremia above  Creed Ovens is now status post amputation of hallux for presumed surgical cure of the ulcer and osteomyelitis  Intraop wound cultures showed MSSA  He had additional I&D and removal and sesamoid bone on 1/12/2020    -serial left foot exams  -follow up intraop wound cultures  -local wound care per Podiatry  -continue close follow-up with Podiatry     4  Acute kidney injury  On CKD  Per patient's medical records it appears his baseline creatinine has been approximately 1  Consider secondary to volume overload  Also consider urinary retention, a ojeda is now in place  He is following closely with nephrology  He is now on Bumex drip   -check BMP tomorrow  -dose adjust antibiotics for renal function as needed  -continue close follow up with nephrology     5  Acute hypoxic respiratory failure   Suspect this is all secondary to pulmonary edema and diastolic heart failure   BNP and troponin were elevated upon admission  Initial chest x-ray and CT of the chest did not show consolidations indicative of a bacterial process  New chest imaging shows persistent CHF  Patient now requiring BiPAP support at night and is now on a Bumex drip   -diuresis per Cardiology/nephrology  -wean BiPAP support as tolerated per primary service  -monitor vitals  -monitor respiratory status  -continue close follow-up with cardiology  -continue close follow-up with nephrology     6  Type 2 diabetes mellitus with neuropathy   Patient's last hemoglobin A1c was 6 4% on 01/07/2020   This is risk factor for wounds and infection   Recommend tight glycemic control for overall health, especially in the setting of wound infection   -blood glucose management per primary service     7  Chronic diastolic heart failure  In setting of aortic and mitral valve stenosis and complete heart block   He has a [employer] dual chamber pacemaker in place   Now with Staph aureus bacteremia  TTE and ELBERT were negative  He is following closely with Cardiology   -continue follow-up with Cardiology     8  Paroxysmal AFib   On Eliquis      9  LUE swelling   Patient reports this appeared to start yesterday  He has no pain in the arm today  SLIM has ordered a LUE venous duplex study  -follow up LUE duplex    Patient is stable for discharge from ID standpoint  If he remains inpatient he will be formally reassessed by the Infectious Disease team on Monday, 2020  Please call sooner with questions or concerns  Above plan was discussed in detail with patient at the bedside  Above plan was discussed in detail with [facility] attending, Dr Jacqui Morse    Antibiotics:  Cefazolin 15  Antibiotics 17  Postop 16    Subjective:  Patient reports he is feeling much better  He is relieved at the swelling in his extremities has gone down  He is not having any difficulty breathing on nasal cannula O2 this morning  He feels like he will be strong enough to go home soon  He has no pain in his left foot and has no concerns with the bandage or wound care  He denies fever, chills, sweats, shakes; no nausea, vomiting, abdominal pain, diarrhea, or dysuria; no shortness of breath or chest pain  No new symptoms  Objective:  Vitals:  Temp:  [97 6 °F (36 4 °C)-98 9 °F (37 2 °C)] 97 6 °F (36 4 °C)  HR:  [60-88] 60  Resp:  [20] 20  BP: (127-150)/(56-68) 150/67  SpO2:  [97 %-100 %] 100 %  Temp (24hrs), Av 4 °F (36 9 °C), Min:97 6 °F (36 4 °C), Max:98 9 °F (37 2 °C)  Current: Temperature: 97 6 °F (36 4 °C)    Physical Exam:   General Appearance:  Alert, interactive, nontoxic, no acute distress  Patient is wearing his glasses   Throat: Oropharynx moist without lesions  Lungs:   Clear to auscultation bilaterally; no wheezes, rhonchi or rales; respirations unlabored; patient is on nasal cannula O2   Heart:  RRR; no murmur, rub, or gallop   Abdomen:   Soft, non-tender, non-distended, positive bowel sounds       Extremities: No clubbing or cyanosis; mild bilateral lower extremity edema L>R; left foot dressing is clean/dry/intact with no spreading erythema from bandage site   Skin: No new rashes, lesions, or draining wounds noted on exposed skin       Labs, Imaging, & Other studies:   All pertinent labs and imaging studies were personally reviewed  Results from last 7 days   Lab Units 01/23/20  0626 01/22/20  0505 01/21/20  0542   WBC Thousand/uL 11 59* 10 18* 12 20*   HEMOGLOBIN g/dL 8 1* 7 5* 7 1*   PLATELETS Thousands/uL 399* 379 394*     Results from last 7 days   Lab Units 01/23/20  0626   POTASSIUM mmol/L 3 3*   CHLORIDE mmol/L 97*   CO2 mmol/L 26   BUN mg/dL 128*   CREATININE mg/dL 2 58*   EGFR ml/min/1 73sq m 25   CALCIUM mg/dL 8 8

## 2020-01-24 NOTE — ASSESSMENT & PLAN NOTE
Wt Readings from Last 3 Encounters:   01/23/20 98 4 kg (216 lb 14 9 oz)   12/13/19 96 2 kg (212 lb)   12/03/19 97 5 kg (215 lb)     Now with acute exacerbation - On Bumex drip  Repeat labs in the am  CXR with volume overload - Repeat labs in the am  ELBERT w/LVEF 60% but moderate to severe mitral stenosis  Appreciate cardiology recommendations

## 2020-01-25 PROBLEM — E87.1 HYPONATREMIA: Status: RESOLVED | Noted: 2020-01-20 | Resolved: 2020-01-25

## 2020-01-25 LAB
ANION GAP SERPL CALCULATED.3IONS-SCNC: 10 MMOL/L (ref 4–13)
BASOPHILS # BLD AUTO: 0.07 THOUSANDS/ΜL (ref 0–0.1)
BASOPHILS NFR BLD AUTO: 1 % (ref 0–1)
BUN SERPL-MCNC: 131 MG/DL (ref 5–25)
CALCIUM SERPL-MCNC: 9 MG/DL (ref 8.3–10.1)
CHLORIDE SERPL-SCNC: 97 MMOL/L (ref 100–108)
CO2 SERPL-SCNC: 28 MMOL/L (ref 21–32)
CREAT SERPL-MCNC: 2.44 MG/DL (ref 0.6–1.3)
EOSINOPHIL # BLD AUTO: 0.62 THOUSAND/ΜL (ref 0–0.61)
EOSINOPHIL NFR BLD AUTO: 11 % (ref 0–6)
ERYTHROCYTE [DISTWIDTH] IN BLOOD BY AUTOMATED COUNT: 14.1 % (ref 11.6–15.1)
GFR SERPL CREATININE-BSD FRML MDRD: 27 ML/MIN/1.73SQ M
GLUCOSE SERPL-MCNC: 111 MG/DL (ref 65–140)
GLUCOSE SERPL-MCNC: 122 MG/DL (ref 65–140)
GLUCOSE SERPL-MCNC: 197 MG/DL (ref 65–140)
GLUCOSE SERPL-MCNC: 263 MG/DL (ref 65–140)
GLUCOSE SERPL-MCNC: 272 MG/DL (ref 65–140)
HCT VFR BLD AUTO: 46 % (ref 36.5–49.3)
HGB BLD-MCNC: 15 G/DL (ref 12–17)
IMM GRANULOCYTES # BLD AUTO: 0.01 THOUSAND/UL (ref 0–0.2)
IMM GRANULOCYTES NFR BLD AUTO: 0 % (ref 0–2)
LYMPHOCYTES # BLD AUTO: 0.82 THOUSANDS/ΜL (ref 0.6–4.47)
LYMPHOCYTES NFR BLD AUTO: 15 % (ref 14–44)
MAGNESIUM SERPL-MCNC: 1.7 MG/DL (ref 1.6–2.6)
MCH RBC QN AUTO: 29 PG (ref 26.8–34.3)
MCHC RBC AUTO-ENTMCNC: 32.6 G/DL (ref 31.4–37.4)
MCV RBC AUTO: 89 FL (ref 82–98)
MONOCYTES # BLD AUTO: 0.54 THOUSAND/ΜL (ref 0.17–1.22)
MONOCYTES NFR BLD AUTO: 10 % (ref 4–12)
NEUTROPHILS # BLD AUTO: 3.37 THOUSANDS/ΜL (ref 1.85–7.62)
NEUTS SEG NFR BLD AUTO: 63 % (ref 43–75)
NRBC BLD AUTO-RTO: 0 /100 WBCS
PLATELET # BLD AUTO: 248 THOUSANDS/UL (ref 149–390)
PMV BLD AUTO: 9.5 FL (ref 8.9–12.7)
POTASSIUM SERPL-SCNC: 3.5 MMOL/L (ref 3.5–5.3)
RBC # BLD AUTO: 5.17 MILLION/UL (ref 3.88–5.62)
SODIUM SERPL-SCNC: 135 MMOL/L (ref 136–145)
WBC # BLD AUTO: 5.43 THOUSAND/UL (ref 4.31–10.16)

## 2020-01-25 PROCEDURE — 99232 SBSQ HOSP IP/OBS MODERATE 35: CPT | Performed by: INTERNAL MEDICINE

## 2020-01-25 PROCEDURE — 80048 BASIC METABOLIC PNL TOTAL CA: CPT | Performed by: INTERNAL MEDICINE

## 2020-01-25 PROCEDURE — 85025 COMPLETE CBC W/AUTO DIFF WBC: CPT | Performed by: INTERNAL MEDICINE

## 2020-01-25 PROCEDURE — 99232 SBSQ HOSP IP/OBS MODERATE 35: CPT | Performed by: NURSE PRACTITIONER

## 2020-01-25 PROCEDURE — 82948 REAGENT STRIP/BLOOD GLUCOSE: CPT

## 2020-01-25 PROCEDURE — 94660 CPAP INITIATION&MGMT: CPT

## 2020-01-25 PROCEDURE — 83735 ASSAY OF MAGNESIUM: CPT | Performed by: INTERNAL MEDICINE

## 2020-01-25 RX ORDER — METOLAZONE 2.5 MG/1
2.5 TABLET ORAL ONCE
Status: COMPLETED | OUTPATIENT
Start: 2020-01-25 | End: 2020-01-25

## 2020-01-25 RX ADMIN — ALTEPLASE 2 MG: 2.2 INJECTION, POWDER, LYOPHILIZED, FOR SOLUTION INTRAVENOUS at 14:47

## 2020-01-25 RX ADMIN — INSULIN GLARGINE 22 UNITS: 100 INJECTION, SOLUTION SUBCUTANEOUS at 21:33

## 2020-01-25 RX ADMIN — POTASSIUM CHLORIDE 20 MEQ: 1500 TABLET, EXTENDED RELEASE ORAL at 17:48

## 2020-01-25 RX ADMIN — METOPROLOL TARTRATE 50 MG: 50 TABLET, FILM COATED ORAL at 08:42

## 2020-01-25 RX ADMIN — FERROUS GLUCONATE 324 MG: 324 TABLET ORAL at 16:37

## 2020-01-25 RX ADMIN — GUAIFENESIN 600 MG: 600 TABLET ORAL at 21:32

## 2020-01-25 RX ADMIN — APIXABAN 5 MG: 5 TABLET, FILM COATED ORAL at 08:43

## 2020-01-25 RX ADMIN — Medication 1 MG/HR: at 02:27

## 2020-01-25 RX ADMIN — CEFAZOLIN SODIUM 1000 MG: 1 SOLUTION INTRAVENOUS at 02:22

## 2020-01-25 RX ADMIN — ACETAMINOPHEN 650 MG: 325 TABLET ORAL at 18:01

## 2020-01-25 RX ADMIN — METOPROLOL TARTRATE 50 MG: 50 TABLET, FILM COATED ORAL at 21:32

## 2020-01-25 RX ADMIN — CEFAZOLIN SODIUM 1000 MG: 1 SOLUTION INTRAVENOUS at 11:32

## 2020-01-25 RX ADMIN — ALLOPURINOL 300 MG: 100 TABLET ORAL at 08:42

## 2020-01-25 RX ADMIN — Medication 1 MG/HR: at 14:46

## 2020-01-25 RX ADMIN — APIXABAN 5 MG: 5 TABLET, FILM COATED ORAL at 17:48

## 2020-01-25 RX ADMIN — INSULIN LISPRO 6 UNITS: 100 INJECTION, SOLUTION INTRAVENOUS; SUBCUTANEOUS at 11:37

## 2020-01-25 RX ADMIN — TAMSULOSIN HYDROCHLORIDE 0.4 MG: 0.4 CAPSULE ORAL at 16:37

## 2020-01-25 RX ADMIN — LORAZEPAM 0.5 MG: 1 TABLET ORAL at 18:04

## 2020-01-25 RX ADMIN — FERROUS GLUCONATE 324 MG: 324 TABLET ORAL at 06:10

## 2020-01-25 RX ADMIN — MELATONIN TAB 3 MG 6 MG: 3 TAB at 21:32

## 2020-01-25 RX ADMIN — PRAVASTATIN SODIUM 40 MG: 40 TABLET ORAL at 16:38

## 2020-01-25 RX ADMIN — POTASSIUM CHLORIDE 20 MEQ: 1500 TABLET, EXTENDED RELEASE ORAL at 08:42

## 2020-01-25 RX ADMIN — AMLODIPINE BESYLATE 10 MG: 10 TABLET ORAL at 08:42

## 2020-01-25 RX ADMIN — INSULIN LISPRO 6 UNITS: 100 INJECTION, SOLUTION INTRAVENOUS; SUBCUTANEOUS at 16:33

## 2020-01-25 RX ADMIN — GUAIFENESIN 600 MG: 600 TABLET ORAL at 08:43

## 2020-01-25 RX ADMIN — CEFAZOLIN SODIUM 1000 MG: 1 SOLUTION INTRAVENOUS at 17:48

## 2020-01-25 RX ADMIN — DOCUSATE SODIUM 100 MG: 100 CAPSULE, LIQUID FILLED ORAL at 08:43

## 2020-01-25 RX ADMIN — METOLAZONE 2.5 MG: 2.5 TABLET ORAL at 14:45

## 2020-01-25 RX ADMIN — LORAZEPAM 0.5 MG: 1 TABLET ORAL at 11:37

## 2020-01-25 NOTE — ASSESSMENT & PLAN NOTE
Lab Results   Component Value Date    HGBA1C 6 4 (H) 01/07/2020       Recent Labs     01/23/20  2101 01/24/20  0739 01/24/20  1117 01/24/20  1552   POCGLU 237* 137 281* 234*       Blood Sugar Average: Last 72 hrs:  (P) 630 [id number] iddm on insulin degludec-liraglutide 18 iu daily  Will transition to lantus at 9 iu qhs and start ssi/poc bs given poor appetite

## 2020-01-25 NOTE — ASSESSMENT & PLAN NOTE
Patient with MSSA Bacteremia from left hallux wound  He remains on Ancef, he has received a PICC line  He did have a ELBERT that was negative for vegetation, he is status post left hallux amputation on 01/08/20/2020 as well as incision and drainage removal of the sesamoid bone on 01/12/2020  This appears to be the nidus of infection  Appreciate Podiatry recommendations  He will continue Ancef through to 01/20/2020  During this time he will need weekly CBCs in creatinine while on intravenous antibiotic    POD 14 today for left hallux amputation

## 2020-01-25 NOTE — ASSESSMENT & PLAN NOTE
Wt Readings from Last 3 Encounters:   01/24/20 98 2 kg (216 lb 7 9 oz)   12/13/19 96 2 kg (212 lb)   12/03/19 97 5 kg (215 lb)     Now with acute exacerbation - On Bumex drip  Repeat labs in the am    ELBERT w/LVEF 60% but moderate to severe mitral stenosis  Appreciate cardiology recommendations

## 2020-01-25 NOTE — ASSESSMENT & PLAN NOTE
Acute hypoxemic respiratory failure in the setting of acute on chronic diastolic congestive heart failure   Continue bumex, wean O2 as tolerated  CXR improved

## 2020-01-25 NOTE — PROGRESS NOTES
Progress Note - Angelica Abarca 75 y o  male MRN: [id number]    Unit/Bed#: E4 -01 Encounter: [id number]  Subjective:   No chest pain    Breathing better  Ongoing edema but better  No palpitations or lightheadedness  Objective:   Vitals: Blood pressure 160/80, pulse 61, temperature 98 1 °F (36 7 °C), temperature source Temporal, resp  rate 16, weight 97 1 kg (214 lb), SpO2 100 %  ,Body mass index is 30 98 kg/m²  CBC with diff:   Results from last 7 days   Lab Units 01/25/20  0618   WBC Thousand/uL 5 43   RBC Million/uL 5 17   HEMOGLOBIN g/dL 15 0   HEMATOCRIT % 46 0   MCV fL 89   MCH pg 29 0   MCHC g/dL 32 6   RDW % 14 1   MPV fL 9 5   PLATELETS Thousands/uL 248     CMP:   Results from last 7 days   Lab Units 01/25/20  0618   SODIUM mmol/L 135*   POTASSIUM mmol/L 3 5   CHLORIDE mmol/L 97*   CO2 mmol/L 28   BUN mg/dL 131*   CREATININE mg/dL 2 44*   CALCIUM mg/dL 9 0   EGFR ml/min/1 73sq m 27     Magnesium:   Results from last 7 days   Lab Units 01/25/20  0618   MAGNESIUM mg/dL 1 7       Physical exam:  Lungs-somewhat decreased breath sounds at the right base with a few basilar rales  No rhonchi or wheezes  Heart-regular with grade 2-3 systolic murmur at the base  No diastolic murmur rub or gallop  Abdomen-soft nontender without mass organomegaly  Extremities-3+ left lower extremity edema with 2+ right lower extremity edema    Assessment:  1  Acute on chronic diastolic congestive heart failure  Large noncardiogenic component due to acute on chronic renal failure  On Bumex drip  Gradually improving  2  Moderate mitral stenosis  Stable from previous evaluation  3  Mild aortic stenosis  Stable from prior evaluation  4  Complete heart block status post permanent pacemaker in the past  5  Hyperlipidemia  Good control with simvastatin as outpatient  6  Paroxysmal atrial fibrillation during acute illness in the past   Remains on Eliquis may be stopped later as an outpatient    Having no ill effect from this   In sinus rhythm here  7  Hypertension  Historically good control on beta-blocker, ARB and calcium channel blockers  Patient off ARB at this time with elevated blood pressures  Will discuss with the renal perhaps tighter control of blood pressure which may not desirable with resolution of renal failure  8  MSSA bacteremia from left hallux wound status post surgery  9  Acute on chronic renal disease  Creatinine improving  10   Diabetes mellitus    Plan:  Continue Bumex drip  Will discuss with Renal if we can give metolazone x1 to speed diuresis  Continue beta-blocker and calcium channel blocker  Continue statin  Anamaria Melchor MD

## 2020-01-25 NOTE — PROGRESS NOTES
NEPHROLOGY PROGRESS NOTE   Mena Uribe 75 y o  male MRN: [id number]  Unit/Bed#: E4 -01 Encounter: [id number]  Reason for Consult: ELZBIETA ( POA)    ASSESSMENT/PLAN:  ELZBIETA (POA):  Likely secondary to cardiorenal syndrome with component of obstructive uropathy, +/- ATN  -status post Jones catheter placement   -diuresis per Cardiology, continues on Bumex drip   - okay to give metolazone 2 5 mg x 1 today  - BUN continues to rise  - denies uremeic symptoms, no urgent indication for RRT  -renal ultrasound showed echogenic kidneys  -UA shows trace ketones, large blood, 300 protein, 4-10 WBCs, 4-10 rbc's, moderate bacteria, 0-1 fine granular cast, 1% eosinophils   -continue to avoid nephrotoxins  -I/O     CKD stage III:  History of nephrotic range proteinuria  -previous baseline creatinine in the low 1s  -follows with Dr Darya Cosme  Hyponatremia:  Likely secondary to volume overload  Improved  -will continue to monitor   -continues on Bumex drip  - will give metolazone x 1 today    -continue fluid restriction  Hypokalemia:  Likely secondary to diuretics  -continues on standing oral potassium replacement   -will continue to monitor potassium and magnesium levels  Volume overload:  EF of 55%  -continue diuresis per Cardiology   - Negative 14 L since admission  -weight is decreasing    - okay to give metolazone x 1 today  - continue fluid restriction    - I/O, daily weights    -chest x-ray shows partial improvement pulmonary edema bilateral effusions  Blood pressure: acceptable, occasionally above goal    - avoid hypotension or high fluctuations in BP    - goal -150 mm Hg    -continue amlodipine 10 mg daily with holding parameters for systolic blood pressure less than 130    -continue metoprolol 50 mg every 12 hours  Disposition:  Requiring additional stay for diuresis  SUBJECTIVE:  The patient is resting in his chair  He denies chest pain or increased shortness of breath    He feels as if his breathing has improved  He remains on 2 L of oxygen  He currently denies any uremic symptoms  He states that his baseline weight is around 205 lb  He denies nausea, vomiting, diarrhea  He denies issues with his appetite  He is urinating without difficulty      OBJECTIVE:  Current Weight: Weight - Scale: 97 1 kg (214 lb)  Vitals:    01/25/20 0314 01/25/20 0600 01/25/20 0734 01/25/20 1105   BP: 156/69  160/80 145/64   BP Location: Left arm  Left arm Left arm   Pulse: 60  61 61   Resp: 17  16 18   Temp: 97 5 °F (36 4 °C)  98 1 °F (36 7 °C) 97 8 °F (36 6 °C)   TempSrc: Temporal  Temporal Temporal   SpO2: 100%  100% 99%   Weight:  97 1 kg (214 lb)         Intake/Output Summary (Last 24 hours) at 1/25/2020 1202  Last data filed at 1/25/2020 1105  Gross per 24 hour   Intake 809 47 ml   Output 2400 ml   Net -1590 53 ml     General: NAD  Skin: warm, dry, intact, no rash  HEENT: Moist mucous membranes, sclera anicteric, normocephalic, atraumatic  Neck: No apparent JVD appreciated  Chest:lung sounds clear B/L, on supplemental O2   CVS:Regular rate and rhythm, no murmer   Abdomen: Soft, round, non-tender, +BS  Extremities:B/L LE edema present L>R  Neuro: alert and oriented  Psych: appropriate mood and affect     Medications:    Current Facility-Administered Medications:     acetaminophen (TYLENOL) tablet 650 mg, 650 mg, Oral, Q6H PRN, Melissa Alonso DO, 650 mg at 01/24/20 1841    allopurinol (ZYLOPRIM) tablet 300 mg, 300 mg, Oral, QAM, Fausto Mcbride, DPM, 300 mg at 01/25/20 3505    alteplase (CATHFLO) injection 2 mg, 2 mg, Intracatheter, Once, Santiago Salas DO    amLODIPine (NORVASC) tablet 10 mg, 10 mg, Oral, Daily, [facility], 10 mg at 01/25/20 5501    apixaban (ELIQUIS) tablet 5 mg, 5 mg, Oral, BID, Melissa Alonso DO, 5 mg at 01/25/20 0843    bumetanide (BUMEX) 12 5 mg infusion 50 mL, 1 mg/hr, Intravenous, Continuous, Ruashl DO Guzman, Last Rate: 4 mL/hr at 01/25/20 0305, 1 mg/hr at 01/25/20 0305    ceFAZolin (ANCEF) IVPB (premix) 1,000 mg, 1,000 mg, Intravenous, Q8H, PAT Rich, Last Rate: 100 mL/hr at 01/25/20 1132, 1,000 mg at 01/25/20 1132    docusate sodium (COLACE) capsule 100 mg, 100 mg, Oral, BID, Melissa Ambron, DO, 100 mg at 01/25/20 0843    ferrous gluconate (FERGON) tablet 324 mg, 324 mg, Oral, BID AC, Melissa Ambron, DO, 324 mg at 01/25/20 0610    guaiFENesin (MUCINEX) 12 hr tablet 600 mg, 600 mg, Oral, BID, Jm Guaman, DPM, 600 mg at 01/25/20 0843    insulin glargine (LANTUS) subcutaneous injection 22 Units 0 22 mL, 22 Units, Subcutaneous, HS, Melissa Ambron, DO, 22 Units at 01/24/20 2100    insulin lispro (HumaLOG) 100 units/mL subcutaneous injection 2-12 Units, 2-12 Units, Subcutaneous, TID AC, 6 Units at 01/25/20 1137 **AND** Fingerstick Glucose (POCT), , , TID AC, Jm Guaman DPM    ipratropium-albuterol (DUO-NEB) 0 5-2 5 mg/3 mL inhalation solution 3 mL, 3 mL, Nebulization, Q6H PRN, Jm Guaman DPFELTON, 3 mL at 01/19/20 1243    LORazepam (ATIVAN) tablet 0 5 mg, 0 5 mg, Oral, Q8H PRN, Melissa Ambron, DO, 0 5 mg at 01/25/20 1137    melatonin tablet 6 mg, 6 mg, Oral, HS, Jm Guaman DPM, 6 mg at 01/24/20 2100    metoprolol tartrate (LOPRESSOR) tablet 50 mg, 50 mg, Oral, Q12H [facility], Jm Guaman DPFELTON, 50 mg at 01/25/20 0842    oxyCODONE (ROXICODONE) IR tablet 5 mg, 5 mg, Oral, Q4H PRN, Jm Riches, DPM, 5 mg at 01/12/20 1709    potassium chloride (K-DUR,KLOR-CON) CR tablet 20 mEq, 20 mEq, Oral, BID, Rujul Hinds, DO, 20 mEq at 01/25/20 0842    pravastatin (PRAVACHOL) tablet 40 mg, 40 mg, Oral, Daily With Kaelyn Young DPM, 40 mg at 01/24/20 1844    tamsulosin [facility]) capsule 0 4 mg, 0 4 mg, Oral, Daily With Kaelyn Young DPM, 0 4 mg at 01/24/20 1843    Laboratory Results:  Results from last 7 days   Lab Units 01/25/20  0618 01/24/20  1349 01/23/20  0626 01/22/20  0505 01/21/20  0542 01/20/20  0617 01/20/20  0100  01/19/20  0439   WBC Thousand/uL 5 43 10 48* 11 59* 10 18* 12 20*  --   --   --  14 89*   HEMOGLOBIN g/dL 15 0 8 6* 8 1* 7 5* 7 1*  --   --   --  9 4*   HEMATOCRIT % 46 0 26 4* 24 8* 22 9* 21 5*  --   --   --  28 7*   PLATELETS Thousands/uL 248 387 399* 379 394*  --   --   --  406*   POTASSIUM mmol/L 3 5 4 2 3 3* 3 3* 3 4* 3 6 3 7   < > 3 7   CHLORIDE mmol/L 97* 95* 97* 98* 98* 95* 97*   < > 95*   CO2 mmol/L 28 27 26 23 22 21 22   < > 23   BUN mg/dL 131* 131* 128* 122* 118* 110* 114*   < > 102*   CREATININE mg/dL 2 44* 2 59* 2 58* 2 75* 2 87* 2 96* 2 98*   < > 2 77*   CALCIUM mg/dL 9 0 8 8 8 8 8 4 8 3 8 3 8 0*   < > 8 3   MAGNESIUM mg/dL 1 7  --   --  2 3  --   --   --   --   --     < > = values in this interval not displayed

## 2020-01-25 NOTE — PROGRESS NOTES
Progress Note - Alonzo Pisano 1955, 75 y o  male MRN: [id number]    Unit/Bed#: E4 -01 Encounter: [id number]    Primary Care Provider: Kwadwo Velasco DO   Date and time admitted to hospital: 1/7/2020 11:29 AM        Chronic diastolic (congestive) heart failure (HCC)  Assessment & Plan  Wt Readings from Last 3 Encounters:   01/24/20 98 2 kg (216 lb 7 9 oz)   12/13/19 96 2 kg (212 lb)   12/03/19 97 5 kg (215 lb)     Now with acute exacerbation - On Bumex drip  Repeat labs in the am    ELBERT w/LVEF 60% but moderate to severe mitral stenosis  Appreciate cardiology recommendations  Anemia  Assessment & Plan  - Hemoccult stools  Tranfuse for HB less than 7   No evidence of melena or blood loss  Repeat in am      Acute renal failure superimposed on stage 3 chronic kidney disease ([facility] [address] )  Assessment & Plan  Suspected elzbieta on ckd  ELZBIETA may be due to illness and poor oral intake    Nephrology consultation  Placed - Clinically volume overloaded  Creatine improved to 2 59    Notable Uremia    Essential hypertension  Assessment & Plan  Hold cozaar/ continue metoprolol       Type 2 diabetes mellitus with chronic kidney disease, without long-term current use of insulin [facility])  Assessment & Plan  Lab Results   Component Value Date    HGBA1C 6 4 (H) 01/07/2020       Recent Labs     01/23/20  2101 01/24/20  0739 01/24/20  1117 01/24/20  1552   POCGLU 237* 137 281* 234*       Blood Sugar Average: Last 72 hrs:  (P) 868 [id number] iddm on insulin degludec-liraglutide 18 iu daily  Will transition to lantus at 9 iu qhs and start ssi/poc bs given poor appetite  Acute diastolic (congestive) heart failure (HCC)  Assessment & Plan  Wt Readings from Last 3 Encounters:   01/24/20 98 2 kg (216 lb 7 9 oz)   12/13/19 96 2 kg (212 lb)   12/03/19 97 5 kg (215 lb)       Weights improving, still with 3+ edema  NICOLASA (obstructive sleep apnea)  Assessment & Plan  Continue cpap qhs      Urinary retention  Assessment & Plan  Jones removed, urinating with no problems  PAF (paroxysmal atrial fibrillation) (HCC)  Assessment & Plan  Ventricular Rate controlled and paced rhythm  Continue lopressor/eliquis    Severe sepsis [facility])  Assessment & Plan  Patient with MSSA Bacteremia from left hallux wound  He remains on Ancef, he has received a PICC line  He did have a ELBRET that was negative for vegetation, he is status post left hallux amputation on 01/08/20/2020 as well as incision and drainage removal of the sesamoid bone on 01/12/2020  This appears to be the nidus of infection  Appreciate Podiatry recommendations  He will continue Ancef through to 01/20/2020  During this time he will need weekly CBCs in creatinine while on intravenous antibiotic    POD 14 today for left hallux amputation  Complete heart block (HCC)  Assessment & Plan  S/p ppm  Monitored on telemetry    Uremia  Assessment & Plan  Azotemia in the setting of diuretics - Greater than 100, not symptomatic - Appreciate Nephrology    Eczema  Assessment & Plan  Stable discrete plaques over back w/silver scaling  Pruritic non painful  Continue op dupixent twice weekly upon d/c  Op f/u with derm  * Acute respiratory failure with hypoxia (HCC)  Assessment & Plan  Acute hypoxemic respiratory failure in the setting of acute on chronic diastolic congestive heart failure   Continue bumex, wean O2 as tolerated  CXR improved        [address] Internal Medicine Progress Note  Patient: Mariposa Guillen 75 y o  male   MRN: [id number]  PCP: Vince Wright DO  Unit/Bed#: E4 -01 Encounter: [id number]  Date Of Visit: 01/24/20    Assessment:    Principal Problem:    Acute respiratory failure with hypoxia ([address] )  Active Problems:    Eczema    Uremia    Complete heart block (HCC)    Severe sepsis (HCC)    PAF (paroxysmal atrial fibrillation) (HCC)    Urinary retention    NICOLASA (obstructive sleep apnea)    Acute diastolic (congestive) heart failure (HCC)    Type 2 diabetes mellitus with chronic kidney disease, without long-term current use of insulin (HCC)    Essential hypertension    Acute renal failure superimposed on stage 3 chronic kidney disease (HCC)    Anemia    Chronic diastolic (congestive) heart failure (HCC)    Elevated troponin I level    Type 2 diabetes mellitus with diabetic neuropathy, without long-term current use of insulin (HCC)    Hyponatremia    Iron deficiency anemia    Anxiety    Hypokalemia      Plan:    · Continue Bumex  · Blood sugar control  · Will need to discuss uremia with nephrology       VTE Pharmacologic Prophylaxis:   Pharmacologic: Apixaban (Eliquis)  Mechanical VTE Prophylaxis in Place: Yes    Patient Centered Rounds: I have performed bedside rounds with nursing staff today  Discussions with Specialists or Other Care Team Provider:  Nephrology correspondence reviewed    Education and Discussions with Family / Patient:  Patient    Time Spent for Care: 30 minutes  More than 50% of total time spent on counseling and coordination of care as described above  Current Length of Stay: 17 day(s)    Current Patient Status: Inpatient   Certification Statement: The patient will continue to require additional inpatient hospital stay due to Uremia as well as discharge planning and antibiotic administration  Additionally will need intravenous Bumex    Discharge Plan / Estimated Discharge Date:  48-72 hours    Code Status: Level 1 - Full Code      Subjective:   Patient seen and examined, reports he feels improved  No chest pain shortness of breath  Denies any difficulty breathing    A complete and comprehensive 14 point organ system review has been performed and all other systems are negative other than stated above      Objective:     Vitals:   Temp (24hrs), Av 2 °F (36 8 °C), Min:97 6 °F (36 4 °C), Max:98 9 °F (37 2 °C)    Temp:  [97 6 °F (36 4 °C)-98 9 °F (37 2 °C)] 97 8 °F (36 6 °C)  HR:  [60-88] 85  Resp:  [20] 20  BP: (127-165)/(56-74) 158/65  SpO2:  [97 %-100 %] 97 %  Body mass index is 31 34 kg/m²  Input and Output Summary (last 24 hours): Intake/Output Summary (Last 24 hours) at 1/24/2020 1956  Last data filed at 1/24/2020 0700  Gross per 24 hour   Intake    Output 1400 ml   Net -1400 ml       Physical Exam:     General: well appearing, no acute distress  HEENT: atraumatic, PERRLA, moist mucosa, normal pharynx, normal tonsils and adenoids, normal tongue, no fluid in sinuses  Neck: Trachea midline, no carotid bruit, no masses  Respiratory: normal chest wall expansion, CTA B, no r/r/w, no rubs  Cardiovascular:  Irregular regular  Abdomen: Soft, non-tender, non-distended, normal bowel sounds in all quadrants, no hepatosplenomegaly, no tympany  Rectal: deferred  Musculoskeletal: normal ROM in upper and lower extremities, 3+ edema of the lower extremity  Integumentary: warm, dry, and pink, with no rash, purpura, or petechia  Heme/Lymph: no lymphadenopathy, no bruises  Neurological: Cranial Nerves II-XII grossly intact, no tics, normal sensation to pressure and light touch  Psychiatric: cooperative with normal mood, affect, and cognition      Additional Data:     Labs:    Results from last 7 days   Lab Units 01/24/20  1349   WBC Thousand/uL 10 48*   HEMOGLOBIN g/dL 8 6*   HEMATOCRIT % 26 4*   PLATELETS Thousands/uL 387   NEUTROS PCT % 71   LYMPHS PCT % 9*   MONOS PCT % 10   EOS PCT % 9*     Results from last 7 days   Lab Units 01/24/20  1349   POTASSIUM mmol/L 4 2   CHLORIDE mmol/L 95*   CO2 mmol/L 27   BUN mg/dL 131*   CREATININE mg/dL 2 59*   CALCIUM mg/dL 8 8           * I Have Reviewed All Lab Data Listed Above  * Additional Pertinent Lab Tests Reviewed:  All Labs Within Last 24 Hours Reviewed    Imaging:    Imaging Reports Reviewed Today Include:  Chest x-ray  Imaging Personally Reviewed by Myself Includes:  Chest x-ray    Recent Cultures (last 7 days):           Last 24 Hours Medication List:     Current Facility-Administered Medications:  acetaminophen 650 mg Oral Q6H PRN Melissa Ambron, DO    allopurinol 300 mg Oral QAM Igor Carver, LIZBETH    amLODIPine 10 mg Oral Daily Su Bernard PA-C    apixaban 5 mg Oral BID Melissa Ambron, DO    bumetanide 1 mg/hr Intravenous Continuous Rujul Hinds, DO Last Rate: 1 mg/hr (01/24/20 1833)   cefazolin 1,000 mg Intravenous Q8H PAT Rich Last Rate: 1,000 mg (01/24/20 1833)   docusate sodium 100 mg Oral BID Melissa Celeste, DO    ferrous gluconate 324 mg Oral BID AC Melissa Alonso, DO    guaiFENesin 600 mg Oral BID Igor Carver DPM    insulin glargine 22 Units Subcutaneous HS Melissa Alonso, DO    insulin lispro 2-12 Units Subcutaneous TID AC Igor Carver DPM    ipratropium-albuterol 3 mL Nebulization Q6H PRN Igor Carver, LIZBETH    LORazepam 0 5 mg Oral Q8H PRN Melissa Alonso, DO    melatonin 6 mg Oral HS Igor Carver, LIZBETH    metoprolol tartrate 50 mg Oral Q12H [facility] Igor Carver DPM    oxyCODONE 5 mg Oral Q4H PRN Igor Carver DPM    potassium chloride 20 mEq Oral BID Rujul Hinds, DO    pravastatin 40 mg Oral Daily With Bryan Mcnamara, DPFELTON    tamsulosin 0 4 mg Oral Daily With Bryan Mcnamara DPM         Today, Patient Was Seen By: Shabbir Gupta DO    ** Please Note: This note has been constructed using a voice recognition system   **

## 2020-01-25 NOTE — ASSESSMENT & PLAN NOTE
Suspected elzbieta on ckd  ELZBIETA may be due to illness and poor oral intake    Nephrology consultation  Placed - Clinically volume overloaded      Creatine improved to 2 59    Notable Uremia

## 2020-01-25 NOTE — ASSESSMENT & PLAN NOTE
Wt Readings from Last 3 Encounters:   01/24/20 98 2 kg (216 lb 7 9 oz)   12/13/19 96 2 kg (212 lb)   12/03/19 97 5 kg (215 lb)       Weights improving, still with 3+ edema

## 2020-01-25 NOTE — PLAN OF CARE
Problem: Potential for Falls  Goal: Patient will remain free of falls  Description  INTERVENTIONS:  - Assess patient frequently for physical needs  -  Identify cognitive and physical deficits and behaviors that affect risk of falls    -  [place] fall precautions as indicated by assessment   - Educate patient/family on patient safety including physical limitations  - Instruct patient to call for assistance with activity based on assessment  - Modify environment to reduce risk of injury  - Consider OT/PT consult to assist with strengthening/mobility  Outcome: Progressing     Problem: PAIN - ADULT  Goal: Verbalizes/displays adequate comfort level or baseline comfort level  Description  Interventions:  - Encourage patient to monitor pain and request assistance  - Assess pain using appropriate pain scale  - Administer analgesics based on type and severity of pain and evaluate response  - Implement non-pharmacological measures as appropriate and evaluate response  - Consider cultural and social influences on pain and pain management  - Notify physician/advanced practitioner if interventions unsuccessful or patient reports new pain  Outcome: Progressing     Problem: INFECTION - ADULT  Goal: Absence or prevention of progression during hospitalization  Description  INTERVENTIONS:  - Assess and monitor for signs and symptoms of infection  - Monitor lab/diagnostic results  - Monitor all insertion sites, i e  indwelling lines, tubes, and drains  - Administer medications as ordered  - Instruct and encourage patient and family to use good hand hygiene technique  - Identify and instruct in appropriate isolation precautions for identified infection/condition   Outcome: Progressing     Problem: SAFETY ADULT  Goal: Maintain or return to baseline ADL function  Description  INTERVENTIONS:  -  Assess patient's ability to carry out ADLs; assess patient's baseline for ADL function and identify physical deficits which impact ability to perform ADLs (bathing, care of mouth/teeth, toileting, grooming, dressing, etc )  - Assess/evaluate cause of self-care deficits   - Assess range of motion  - Assess patient's mobility; develop plan if impaired  - Assess patient's need for assistive devices and provide as appropriate  - Encourage maximum independence but intervene and supervise when necessary  - Involve family in performance of ADLs  - Assess for home care needs following discharge   - Consider OT consult to assist with ADL evaluation and planning for discharge  - Provide patient education as appropriate  Outcome: Progressing  Goal: Maintain or return mobility status to optimal level  Description  INTERVENTIONS:  - Assess patient's baseline mobility status (ambulation, transfers, stairs, etc )    - Identify cognitive and physical deficits and behaviors that affect mobility  - Identify mobility aids required to assist with transfers and/or ambulation (gait belt, sit-to-stand, lift, walker, cane, etc )  - [place] fall precautions as indicated by assessment  - Record patient progress and toleration of activity level on Mobility SBAR; progress patient to next Phase/Stage  - Instruct patient to call for assistance with activity based on assessment  - Consider rehabilitation consult to assist with strengthening/weightbearing, etc   Outcome: Progressing     Problem: DISCHARGE PLANNING  Goal: Discharge to home or other facility with appropriate resources  Description  INTERVENTIONS:  - Identify barriers to discharge w/patient and caregiver  - Arrange for needed discharge resources and transportation as appropriate  - Identify discharge learning needs (meds, wound care, etc )  - Arrange for interpretive services to assist at discharge as needed  - Refer to Case Management Department for coordinating discharge planning if the patient needs post-hospital services based on physician/advanced practitioner order or complex needs related to functional status, cognitive ability, or social support system  Outcome: Progressing     Problem: Knowledge Deficit  Goal: Patient/family/caregiver demonstrates understanding of disease process, treatment plan, medications, and discharge instructions  Description  Complete learning assessment and assess knowledge base    Interventions:  - Provide teaching at level of understanding  - Provide teaching via preferred learning methods  Outcome: Progressing     Problem: CARDIOVASCULAR - ADULT  Goal: Maintains optimal cardiac output and hemodynamic stability  Description  INTERVENTIONS:  - Monitor I/O, vital signs and rhythm  - Monitor for S/S and trends of decreased cardiac output  - Administer and titrate ordered vasoactive medications to optimize hemodynamic stability  - Assess quality of pulses, skin color and temperature  - Assess for signs of decreased coronary artery perfusion  - Instruct patient to report change in severity of symptoms  Outcome: Progressing  Goal: Absence of cardiac dysrhythmias or at baseline rhythm  Description  INTERVENTIONS:  - Continuous cardiac monitoring, vital signs, obtain 12 lead EKG if ordered  - Administer antiarrhythmic and heart rate control medications as ordered  - Monitor electrolytes and administer replacement therapy as ordered  Outcome: Progressing     Problem: RESPIRATORY - ADULT  Goal: Achieves optimal ventilation and oxygenation  Description  INTERVENTIONS:  - Assess for changes in respiratory status  - Assess for changes in mentation and behavior  - Position to facilitate oxygenation and minimize respiratory effort  - Oxygen administered by appropriate delivery if ordered  - Initiate smoking cessation education as indicated  - Encourage broncho-pulmonary hygiene including cough, deep breathe, Incentive Spirometry  - Assess the need for suctioning and aspirate as needed  - Assess and instruct to report SOB or any respiratory difficulty  - Respiratory Therapy support as indicated  Outcome: Progressing     Problem: Prexisting or High Potential for Compromised Skin Integrity  Goal: Skin integrity is maintained or improved  Description  INTERVENTIONS:  - Identify patients at risk for skin breakdown  - Assess and monitor skin integrity  - Assess and monitor nutrition and hydration status  - Monitor labs   - Assess for incontinence   - Turn and reposition patient  - Assist with mobility/ambulation  - Relieve pressure over bony prominences  - Avoid friction and shearing  - Provide appropriate hygiene as needed including keeping skin clean and dry  - Evaluate need for skin moisturizer/barrier cream  - Collaborate with interdisciplinary team   - Patient/family teaching  - Consider wound care consult   Outcome: Progressing     Problem: Nutrition/Hydration-ADULT  Goal: Nutrient/Hydration intake appropriate for improving, restoring or maintaining nutritional needs  Description  Monitor and assess patient's nutrition/hydration status for malnutrition  Collaborate with interdisciplinary team and initiate plan and interventions as ordered  Monitor patient's weight and dietary intake as ordered or per policy  Utilize nutrition screening tool and intervene as necessary  Determine patient's food preferences and provide high-protein, high-caloric foods as appropriate       INTERVENTIONS:  - Monitor oral intake, urinary output, labs, and treatment plans  - Assess nutrition and hydration status and recommend course of action  - Evaluate amount of meals eaten  - Assist patient with eating if necessary   - Allow adequate time for meals  - Recommend/ encourage appropriate diets, oral nutritional supplements, and vitamin/mineral supplements  - Order, calculate, and assess calorie counts as needed  - Recommend, monitor, and adjust tube feedings and TPN/PPN based on assessed needs  - Assess need for intravenous fluids  - Provide specific nutrition/hydration education as appropriate  - Include patient/family/caregiver in decisions related to nutrition  Outcome: Progressing     Problem: METABOLIC, FLUID AND ELECTROLYTES - ADULT  Goal: Glucose maintained within target range  Description  INTERVENTIONS:  - Monitor Blood Glucose as ordered  - Assess for signs and symptoms of hyperglycemia and hypoglycemia  - Administer ordered medications to maintain glucose within target range  - Assess nutritional intake and initiate nutrition service referral as needed  Outcome: Progressing     Problem: SKIN/TISSUE INTEGRITY - ADULT  Goal: Skin integrity remains intact  Description  INTERVENTIONS  - Identify patients at risk for skin breakdown  - Assess and monitor skin integrity  - Assess and monitor nutrition and hydration status  - Monitor labs (i e  albumin)  - Assess for incontinence   - Turn and reposition patient  - Assist with mobility/ambulation  - Relieve pressure over bony prominences  - Avoid friction and shearing  - Provide appropriate hygiene as needed including keeping skin clean and dry  - Evaluate need for skin moisturizer/barrier cream  - Collaborate with interdisciplinary team (i e  Nutrition, Rehabilitation, etc )   - Patient/family teaching  Outcome: Progressing  Goal: Incision(s), wounds(s) or drain site(s) healing without S/S of infection  Description  INTERVENTIONS  - Assess and document risk factors for skin impairment   - Assess and document dressing, incision, wound bed, drain sites and surrounding tissue  - Consider nutrition services referral as needed  - Oral mucous membranes remain intact  - Provide patient/ family education  Outcome: Progressing  Goal: Oral mucous membranes remain intact  Description  INTERVENTIONS  - Assess oral mucosa and hygiene practices  - Implement preventative oral hygiene regimen  - Implement oral medicated treatments as ordered  - Initiate Nutrition services referral as needed  Outcome: Progressing

## 2020-01-26 PROBLEM — E87.6 HYPOKALEMIA: Status: RESOLVED | Noted: 2020-01-21 | Resolved: 2020-01-26

## 2020-01-26 LAB
ANION GAP SERPL CALCULATED.3IONS-SCNC: 9 MMOL/L (ref 4–13)
BUN SERPL-MCNC: 143 MG/DL (ref 5–25)
CALCIUM SERPL-MCNC: 8.9 MG/DL (ref 8.3–10.1)
CHLORIDE SERPL-SCNC: 96 MMOL/L (ref 100–108)
CO2 SERPL-SCNC: 29 MMOL/L (ref 21–32)
CREAT SERPL-MCNC: 2.45 MG/DL (ref 0.6–1.3)
GFR SERPL CREATININE-BSD FRML MDRD: 26 ML/MIN/1.73SQ M
GLUCOSE SERPL-MCNC: 194 MG/DL (ref 65–140)
GLUCOSE SERPL-MCNC: 199 MG/DL (ref 65–140)
GLUCOSE SERPL-MCNC: 211 MG/DL (ref 65–140)
GLUCOSE SERPL-MCNC: 225 MG/DL (ref 65–140)
GLUCOSE SERPL-MCNC: 95 MG/DL (ref 65–140)
POTASSIUM SERPL-SCNC: 3.6 MMOL/L (ref 3.5–5.3)
SODIUM SERPL-SCNC: 134 MMOL/L (ref 136–145)

## 2020-01-26 PROCEDURE — 94660 CPAP INITIATION&MGMT: CPT

## 2020-01-26 PROCEDURE — 99232 SBSQ HOSP IP/OBS MODERATE 35: CPT | Performed by: INTERNAL MEDICINE

## 2020-01-26 PROCEDURE — 80048 BASIC METABOLIC PNL TOTAL CA: CPT | Performed by: NURSE PRACTITIONER

## 2020-01-26 PROCEDURE — 82948 REAGENT STRIP/BLOOD GLUCOSE: CPT

## 2020-01-26 PROCEDURE — 99233 SBSQ HOSP IP/OBS HIGH 50: CPT | Performed by: INTERNAL MEDICINE

## 2020-01-26 RX ORDER — FUROSEMIDE 80 MG
80 TABLET ORAL
Status: DISCONTINUED | OUTPATIENT
Start: 2020-01-26 | End: 2020-01-29 | Stop reason: HOSPADM

## 2020-01-26 RX ADMIN — CEFAZOLIN SODIUM 1000 MG: 1 SOLUTION INTRAVENOUS at 01:43

## 2020-01-26 RX ADMIN — FERROUS GLUCONATE 324 MG: 324 TABLET ORAL at 17:27

## 2020-01-26 RX ADMIN — INSULIN GLARGINE 22 UNITS: 100 INJECTION, SOLUTION SUBCUTANEOUS at 21:10

## 2020-01-26 RX ADMIN — GUAIFENESIN 600 MG: 600 TABLET ORAL at 08:48

## 2020-01-26 RX ADMIN — ALLOPURINOL 300 MG: 100 TABLET ORAL at 08:48

## 2020-01-26 RX ADMIN — ACETAMINOPHEN 650 MG: 325 TABLET ORAL at 11:43

## 2020-01-26 RX ADMIN — PRAVASTATIN SODIUM 40 MG: 40 TABLET ORAL at 17:27

## 2020-01-26 RX ADMIN — APIXABAN 5 MG: 5 TABLET, FILM COATED ORAL at 08:48

## 2020-01-26 RX ADMIN — ACETAMINOPHEN 650 MG: 325 TABLET ORAL at 21:16

## 2020-01-26 RX ADMIN — CEFAZOLIN SODIUM 1000 MG: 1 SOLUTION INTRAVENOUS at 11:38

## 2020-01-26 RX ADMIN — CEFAZOLIN SODIUM 1000 MG: 1 SOLUTION INTRAVENOUS at 17:30

## 2020-01-26 RX ADMIN — Medication 1 MG/HR: at 05:07

## 2020-01-26 RX ADMIN — METOPROLOL TARTRATE 50 MG: 50 TABLET, FILM COATED ORAL at 08:48

## 2020-01-26 RX ADMIN — APIXABAN 5 MG: 5 TABLET, FILM COATED ORAL at 17:27

## 2020-01-26 RX ADMIN — GUAIFENESIN 600 MG: 600 TABLET ORAL at 21:09

## 2020-01-26 RX ADMIN — AMLODIPINE BESYLATE 10 MG: 10 TABLET ORAL at 08:48

## 2020-01-26 RX ADMIN — INSULIN LISPRO 4 UNITS: 100 INJECTION, SOLUTION INTRAVENOUS; SUBCUTANEOUS at 11:39

## 2020-01-26 RX ADMIN — FERROUS GLUCONATE 324 MG: 324 TABLET ORAL at 06:05

## 2020-01-26 RX ADMIN — FUROSEMIDE 80 MG: 80 TABLET ORAL at 17:26

## 2020-01-26 RX ADMIN — TAMSULOSIN HYDROCHLORIDE 0.4 MG: 0.4 CAPSULE ORAL at 17:26

## 2020-01-26 RX ADMIN — MELATONIN TAB 3 MG 6 MG: 3 TAB at 21:09

## 2020-01-26 RX ADMIN — LORAZEPAM 0.5 MG: 1 TABLET ORAL at 21:24

## 2020-01-26 RX ADMIN — INSULIN LISPRO 2 UNITS: 100 INJECTION, SOLUTION INTRAVENOUS; SUBCUTANEOUS at 17:29

## 2020-01-26 RX ADMIN — POTASSIUM CHLORIDE 20 MEQ: 1500 TABLET, EXTENDED RELEASE ORAL at 17:30

## 2020-01-26 RX ADMIN — POTASSIUM CHLORIDE 20 MEQ: 1500 TABLET, EXTENDED RELEASE ORAL at 08:48

## 2020-01-26 RX ADMIN — METOPROLOL TARTRATE 50 MG: 50 TABLET, FILM COATED ORAL at 21:09

## 2020-01-26 NOTE — PLAN OF CARE
Problem: Potential for Falls  Goal: Patient will remain free of falls  Description  INTERVENTIONS:  - Assess patient frequently for physical needs  -  Identify cognitive and physical deficits and behaviors that affect risk of falls    -  [place] fall precautions as indicated by assessment   - Educate patient/family on patient safety including physical limitations  - Instruct patient to call for assistance with activity based on assessment  - Modify environment to reduce risk of injury  - Consider OT/PT consult to assist with strengthening/mobility  Outcome: Progressing     Problem: PAIN - ADULT  Goal: Verbalizes/displays adequate comfort level or baseline comfort level  Description  Interventions:  - Encourage patient to monitor pain and request assistance  - Assess pain using appropriate pain scale  - Administer analgesics based on type and severity of pain and evaluate response  - Implement non-pharmacological measures as appropriate and evaluate response  - Consider cultural and social influences on pain and pain management  - Notify physician/advanced practitioner if interventions unsuccessful or patient reports new pain  Outcome: Progressing     Problem: INFECTION - ADULT  Goal: Absence or prevention of progression during hospitalization  Description  INTERVENTIONS:  - Assess and monitor for signs and symptoms of infection  - Monitor lab/diagnostic results  - Monitor all insertion sites, i e  indwelling lines, tubes, and drains  - Administer medications as ordered  - Instruct and encourage patient and family to use good hand hygiene technique  - Identify and instruct in appropriate isolation precautions for identified infection/condition   Outcome: Progressing     Problem: SAFETY ADULT  Goal: Maintain or return to baseline ADL function  Description  INTERVENTIONS:  -  Assess patient's ability to carry out ADLs; assess patient's baseline for ADL function and identify physical deficits which impact ability to perform ADLs (bathing, care of mouth/teeth, toileting, grooming, dressing, etc )  - Assess/evaluate cause of self-care deficits   - Assess range of motion  - Assess patient's mobility; develop plan if impaired  - Assess patient's need for assistive devices and provide as appropriate  - Encourage maximum independence but intervene and supervise when necessary  - Involve family in performance of ADLs  - Assess for home care needs following discharge   - Consider OT consult to assist with ADL evaluation and planning for discharge  - Provide patient education as appropriate  Outcome: Progressing  Goal: Maintain or return mobility status to optimal level  Description  INTERVENTIONS:  - Assess patient's baseline mobility status (ambulation, transfers, stairs, etc )    - Identify cognitive and physical deficits and behaviors that affect mobility  - Identify mobility aids required to assist with transfers and/or ambulation (gait belt, sit-to-stand, lift, walker, cane, etc )  - [place] fall precautions as indicated by assessment  - Record patient progress and toleration of activity level on Mobility SBAR; progress patient to next Phase/Stage  - Instruct patient to call for assistance with activity based on assessment  - Consider rehabilitation consult to assist with strengthening/weightbearing, etc   Outcome: Progressing     Problem: DISCHARGE PLANNING  Goal: Discharge to home or other facility with appropriate resources  Description  INTERVENTIONS:  - Identify barriers to discharge w/patient and caregiver  - Arrange for needed discharge resources and transportation as appropriate  - Identify discharge learning needs (meds, wound care, etc )  - Arrange for interpretive services to assist at discharge as needed  - Refer to Case Management Department for coordinating discharge planning if the patient needs post-hospital services based on physician/advanced practitioner order or complex needs related to functional status, cognitive ability, or social support system  Outcome: Progressing     Problem: Knowledge Deficit  Goal: Patient/family/caregiver demonstrates understanding of disease process, treatment plan, medications, and discharge instructions  Description  Complete learning assessment and assess knowledge base    Interventions:  - Provide teaching at level of understanding  - Provide teaching via preferred learning methods  Outcome: Progressing     Problem: CARDIOVASCULAR - ADULT  Goal: Maintains optimal cardiac output and hemodynamic stability  Description  INTERVENTIONS:  - Monitor I/O, vital signs and rhythm  - Monitor for S/S and trends of decreased cardiac output  - Administer and titrate ordered vasoactive medications to optimize hemodynamic stability  - Assess quality of pulses, skin color and temperature  - Assess for signs of decreased coronary artery perfusion  - Instruct patient to report change in severity of symptoms  Outcome: Progressing  Goal: Absence of cardiac dysrhythmias or at baseline rhythm  Description  INTERVENTIONS:  - Continuous cardiac monitoring, vital signs, obtain 12 lead EKG if ordered  - Administer antiarrhythmic and heart rate control medications as ordered  - Monitor electrolytes and administer replacement therapy as ordered  Outcome: Progressing     Problem: RESPIRATORY - ADULT  Goal: Achieves optimal ventilation and oxygenation  Description  INTERVENTIONS:  - Assess for changes in respiratory status  - Assess for changes in mentation and behavior  - Position to facilitate oxygenation and minimize respiratory effort  - Oxygen administered by appropriate delivery if ordered  - Initiate smoking cessation education as indicated  - Encourage broncho-pulmonary hygiene including cough, deep breathe, Incentive Spirometry  - Assess the need for suctioning and aspirate as needed  - Assess and instruct to report SOB or any respiratory difficulty  - Respiratory Therapy support as indicated  Outcome: Progressing     Problem: Prexisting or High Potential for Compromised Skin Integrity  Goal: Skin integrity is maintained or improved  Description  INTERVENTIONS:  - Identify patients at risk for skin breakdown  - Assess and monitor skin integrity  - Assess and monitor nutrition and hydration status  - Monitor labs   - Assess for incontinence   - Turn and reposition patient  - Assist with mobility/ambulation  - Relieve pressure over bony prominences  - Avoid friction and shearing  - Provide appropriate hygiene as needed including keeping skin clean and dry  - Evaluate need for skin moisturizer/barrier cream  - Collaborate with interdisciplinary team   - Patient/family teaching  - Consider wound care consult   Outcome: Progressing     Problem: Nutrition/Hydration-ADULT  Goal: Nutrient/Hydration intake appropriate for improving, restoring or maintaining nutritional needs  Description  Monitor and assess patient's nutrition/hydration status for malnutrition  Collaborate with interdisciplinary team and initiate plan and interventions as ordered  Monitor patient's weight and dietary intake as ordered or per policy  Utilize nutrition screening tool and intervene as necessary  Determine patient's food preferences and provide high-protein, high-caloric foods as appropriate       INTERVENTIONS:  - Monitor oral intake, urinary output, labs, and treatment plans  - Assess nutrition and hydration status and recommend course of action  - Evaluate amount of meals eaten  - Assist patient with eating if necessary   - Allow adequate time for meals  - Recommend/ encourage appropriate diets, oral nutritional supplements, and vitamin/mineral supplements  - Order, calculate, and assess calorie counts as needed  - Recommend, monitor, and adjust tube feedings and TPN/PPN based on assessed needs  - Assess need for intravenous fluids  - Provide specific nutrition/hydration education as appropriate  - Include patient/family/caregiver in decisions related to nutrition  Outcome: Progressing     Problem: METABOLIC, FLUID AND ELECTROLYTES - ADULT  Goal: Glucose maintained within target range  Description  INTERVENTIONS:  - Monitor Blood Glucose as ordered  - Assess for signs and symptoms of hyperglycemia and hypoglycemia  - Administer ordered medications to maintain glucose within target range  - Assess nutritional intake and initiate nutrition service referral as needed  Outcome: Progressing     Problem: SKIN/TISSUE INTEGRITY - ADULT  Goal: Skin integrity remains intact  Description  INTERVENTIONS  - Identify patients at risk for skin breakdown  - Assess and monitor skin integrity  - Assess and monitor nutrition and hydration status  - Monitor labs (i e  albumin)  - Assess for incontinence   - Turn and reposition patient  - Assist with mobility/ambulation  - Relieve pressure over bony prominences  - Avoid friction and shearing  - Provide appropriate hygiene as needed including keeping skin clean and dry  - Evaluate need for skin moisturizer/barrier cream  - Collaborate with interdisciplinary team (i e  Nutrition, Rehabilitation, etc )   - Patient/family teaching  Outcome: Progressing  Goal: Incision(s), wounds(s) or drain site(s) healing without S/S of infection  Description  INTERVENTIONS  - Assess and document risk factors for skin impairment   - Assess and document dressing, incision, wound bed, drain sites and surrounding tissue  - Consider nutrition services referral as needed  - Oral mucous membranes remain intact  - Provide patient/ family education  Outcome: Progressing  Goal: Oral mucous membranes remain intact  Description  INTERVENTIONS  - Assess oral mucosa and hygiene practices  - Implement preventative oral hygiene regimen  - Implement oral medicated treatments as ordered  - Initiate Nutrition services referral as needed  Outcome: Progressing

## 2020-01-26 NOTE — PROGRESS NOTES
Progress Note - Bhupendra Sherwood 1955, 75 y o  male MRN: [id number]    Unit/Bed#: E4 -01 Encounter: [id number]    Primary Care Provider: Hilda Whitaker DO   Date and time admitted to hospital: 1/7/2020 11:29 AM        Hypokalemia  Assessment & Plan  Repletion by Nephrology    Anxiety  Assessment & Plan  Ativan PRN    Iron deficiency anemia  Assessment & Plan  S/P one unit of PRBC's - HB stable    Type 2 diabetes mellitus with diabetic neuropathy, without long-term current use of insulin [facility])  Assessment & Plan  Lab Results   Component Value Date    HGBA1C 6 4 (H) 01/07/2020       Recent Labs     01/24/20  2117 01/25/20  0732 01/25/20  1102 01/25/20  1518   POCGLU 227* 122 272* 263*       Blood Sugar Average: Last 72 hrs:  (P) 197 [id number]     SSI and basal bolus insulin    Essential hypertension  Assessment & Plan  Hold cozaar/ continue metoprolol       Type 2 diabetes mellitus with chronic kidney disease, without long-term current use of insulin [facility])  Assessment & Plan  Lab Results   Component Value Date    HGBA1C 6 4 (H) 01/07/2020       Recent Labs     01/24/20  2117 01/25/20  0732 01/25/20  1102 01/25/20  1518   POCGLU 227* 122 272* 263*       Blood Sugar Average: Last 72 hrs:  (P) 505 [id number] iddm on insulin degludec-liraglutide 18 iu daily  Will transition to lantus at 9 iu qhs and start ssi/poc bs given poor appetite  NICOLASA (obstructive sleep apnea)  Assessment & Plan  Continue cpap qhs  Urinary retention  Assessment & Plan  Jones removed, urinating with no problems  PAF (paroxysmal atrial fibrillation) ([facility])  Assessment & Plan  Ventricular Rate controlled and paced rhythm  Continue lopressor/eliquis    Severe sepsis [facility])  Assessment & Plan  Patient with MSSA Bacteremia from left hallux wound  He remains on Ancef, he has received a PICC line    He did have a ELBERT that was negative for vegetation, he is status post left hallux amputation on 01/08/20/2020 as well as incision and drainage removal of the sesamoid bone on 01/12/2020  This appears to be the nidus of infection  Appreciate Podiatry recommendations  He will continue Ancef through to 01/20/2020  During this time he will need weekly CBCs in creatinine while on intravenous antibiotic    POD 15 today for left hallux amputation  Complete heart block (HCC)  Assessment & Plan  S/p ppm  Monitored on telemetry    Uremia  Assessment & Plan  Azotemia in the setting of diuretics - Greater than 100, not symptomatic - Appreciate Nephrology-    Eczema  Assessment & Plan  Stable discrete plaques over back w/silver scaling  Pruritic non painful  Continue op dupixent twice weekly upon d/c  Op f/u with derm  * Acute respiratory failure with hypoxia (HCC)  Assessment & Plan  Acute hypoxemic respiratory failure in the setting of acute on chronic diastolic congestive heart failure   Continue bumex per cardiology and nephrology    CXR improved        [facility] Internal Medicine Progress Note  Patient: Edgardo Arce 75 y o  male   MRN: [id number]  PCP: Jarrett Canas DO  Unit/Bed#: E4 -01 Encounter: [id number]  Date Of Visit: 01/25/20    Assessment:    Principal Problem:    Acute respiratory failure with hypoxia ([facility] [address] )  Active Problems:    Eczema    Uremia    Complete heart block (HCC)    Severe sepsis (HCC)    PAF (paroxysmal atrial fibrillation) (HCC)    Urinary retention    NICOLASA (obstructive sleep apnea)    Acute diastolic (congestive) heart failure (HCC)    Type 2 diabetes mellitus with chronic kidney disease, without long-term current use of insulin (HCC)    Essential hypertension    Acute renal failure superimposed on stage 3 chronic kidney disease (HCC)    Anemia    Chronic diastolic (congestive) heart failure (HCC)    Elevated troponin I level    Type 2 diabetes mellitus with diabetic neuropathy, without long-term current use of insulin (HCC)    Iron deficiency anemia    Anxiety Hypokalemia      Plan:    · Continue current treatment  · Diuretics per Nephrology  ·        VTE Pharmacologic Prophylaxis:   Pharmacologic: Apixaban (Eliquis)  Mechanical VTE Prophylaxis in Place: Yes    Patient Centered Rounds: I have performed bedside rounds with nursing staff today  Discussions with Specialists or Other Care Team Provider:  Nephrology    Education and Discussions with Family / Patient:  Patient    Time Spent for Care: 30 minutes  More than 50% of total time spent on counseling and coordination of care as described above  Current Length of Stay: 18 day(s)    Current Patient Status: Inpatient   Certification Statement: The patient will continue to require additional inpatient hospital stay due to Discharge planning and uremia    Discharge Plan / Estimated Discharge Date:  48 hours    Code Status: Level 1 - Full Code      Subjective:   Patient seen examined, no chest pain shortness of breath difficulty breathing  A complete and comprehensive 14 point organ system review has been performed and all other systems are negative other than stated above  Objective:     Vitals:   Temp (24hrs), Av 7 °F (36 5 °C), Min:97 2 °F (36 2 °C), Max:98 1 °F (36 7 °C)    Temp:  [97 2 °F (36 2 °C)-98 1 °F (36 7 °C)] 97 9 °F (36 6 °C)  HR:  [60-85] 72  Resp:  [16-20] 18  BP: (142-160)/(59-80) 153/59  SpO2:  [95 %-100 %] 100 %  Body mass index is 30 98 kg/m²  Input and Output Summary (last 24 hours):        Intake/Output Summary (Last 24 hours) at 2020 1914  Last data filed at 2020 1430  Gross per 24 hour   Intake 1049 47 ml   Output 2700 ml   Net -1650 53 ml       Physical Exam:     General: well appearing, no acute distress  HEENT: atraumatic, PERRLA, moist mucosa, normal pharynx, normal tonsils and adenoids, normal tongue, no fluid in sinuses  Neck: Trachea midline, no carotid bruit, no masses  Respiratory: normal chest wall expansion, CTA B, no r/r/w, no rubs  Cardiovascular: RRR, no m/r/g, Normal S1 and S2  Abdomen: Soft, non-tender, non-distended, normal bowel sounds in all quadrants, no hepatosplenomegaly, no tympany  Rectal: deferred  Musculoskeletal: normal ROM in upper and lower extremities, 3+ edema  Integumentary: warm, dry, and pink, with no rash, purpura, or petechia  Heme/Lymph: no lymphadenopathy, no bruises  Neurological: Cranial Nerves II-XII grossly intact, no tics, normal sensation to pressure and light touch  Psychiatric: cooperative with normal mood, affect, and cognition      Additional Data:     Labs:    Results from last 7 days   Lab Units 01/25/20  0618   WBC Thousand/uL 5 43   HEMOGLOBIN g/dL 15 0   HEMATOCRIT % 46 0   PLATELETS Thousands/uL 248   NEUTROS PCT % 63   LYMPHS PCT % 15   MONOS PCT % 10   EOS PCT % 11*     Results from last 7 days   Lab Units 01/25/20  0618   POTASSIUM mmol/L 3 5   CHLORIDE mmol/L 97*   CO2 mmol/L 28   BUN mg/dL 131*   CREATININE mg/dL 2 44*   CALCIUM mg/dL 9 0           * I Have Reviewed All Lab Data Listed Above  * Additional Pertinent Lab Tests Reviewed:  Jess 66 Admission Reviewed    Imaging:    No new imaging    Recent Cultures (last 7 days):           Last 24 Hours Medication List:     Current Facility-Administered Medications:  acetaminophen 650 mg Oral Q6H PRN Melissa Ambron, DO    allopurinol 300 mg Oral QAM Cliffton Bones, DPM    amLODIPine 10 mg Oral Daily Su Bernard, PAAlexandraC    apixaban 5 mg Oral BID Melissa Ambron, DO    bumetanide 1 mg/hr Intravenous Continuous Rujul Hinds, DO Last Rate: 1 mg/hr (01/25/20 1446)   cefazolin 1,000 mg Intravenous Q8H PAT Rich Last Rate: 1,000 mg (01/25/20 1748)   docusate sodium 100 mg Oral BID Melissa Ambron, DO    ferrous gluconate 324 mg Oral BID AC Melissa Ambron, DO    guaiFENesin 600 mg Oral BID Cliffton Bones, DPM    insulin glargine 22 Units Subcutaneous HS Melissa Ambron, DO    insulin lispro 2-12 Units Subcutaneous TID AC Cliffton Bones, DPM ipratropium-albuterol 3 mL Nebulization Q6H PRN Janosvaldo Montez, DPM    LORazepam 0 5 mg Oral Q8H PRN Melissa Alonso, DO    melatonin 6 mg Oral HS Janeilyubov Montez, DPM    metoprolol tartrate 50 mg Oral Q12H [facility] Janeice Tc, DPM    oxyCODONE 5 mg Oral Q4H PRN Janosvaldo Montez, DPM    potassium chloride 20 mEq Oral BID Rujul Guzman, DO    pravastatin 40 mg Oral Daily With Perfecto Loge, DPM    tamsulosin 0 4 mg Oral Daily With Perfecto Loge, DPM         Today, Patient Was Seen By: Srinivas Gregg DO    ** Please Note: This note has been constructed using a voice recognition system   **

## 2020-01-26 NOTE — ASSESSMENT & PLAN NOTE
Acute hypoxemic respiratory failure in the setting of acute on chronic diastolic congestive heart failure   Continue bumex per cardiology and nephrology    CXR improved

## 2020-01-26 NOTE — ASSESSMENT & PLAN NOTE
Patient with MSSA Bacteremia from left hallux wound  He remains on Ancef, he has received a PICC line  He did have a ELBERT that was negative for vegetation, he is status post left hallux amputation on 01/08/20/2020 as well as incision and drainage removal of the sesamoid bone on 01/12/2020  This appears to be the nidus of infection  Appreciate Podiatry recommendations  He will continue Ancef through to 01/20/2020  During this time he will need weekly CBCs in creatinine while on intravenous antibiotic    POD 15 today for left hallux amputation

## 2020-01-26 NOTE — ASSESSMENT & PLAN NOTE
Lab Results   Component Value Date    HGBA1C 6 4 (H) 01/07/2020       Recent Labs     01/24/20  2117 01/25/20  0732 01/25/20  1102 01/25/20  1518   POCGLU 227* 122 272* 263*       Blood Sugar Average: Last 72 hrs:  (P) 682 [id number]     SSI and basal bolus insulin

## 2020-01-26 NOTE — PROGRESS NOTES
Progress Note - Willard Brian 75 y o  male MRN: [id number]    Unit/Bed#: E4 -01 Encounter: [id number]  Subjective:   No chest pain or SOB    Edema better  No dizziness or palpitations    Objective:   Vitals: Blood pressure 148/81, pulse 62, temperature 97 8 °F (36 6 °C), temperature source Temporal, resp  rate 18, weight 95 5 kg (210 lb 8 6 oz), SpO2 99 %  ,Body mass index is 30 48 kg/m²  CMP:   Results from last 7 days   Lab Units 01/26/20  0855   SODIUM mmol/L 134*   POTASSIUM mmol/L 3 6   CHLORIDE mmol/L 96*   CO2 mmol/L 29   BUN mg/dL 143*   CREATININE mg/dL 2 45*   CALCIUM mg/dL 8 9   EGFR ml/min/1 73sq m 26         Physical exam:  Lungs-clear  without wheezing rhonchi or rales  Heart-regular with grade 2-3 systolic murmur at the base  No diastolic murmur rub or gallop  Abdomen-soft nontender without mass organomegaly  Extremities -2 to 3+ lower extremity edema with 1 to 2+ right lower extremity edema    Assessment:  1  Acute on chronic diastolic heart failure  Large noncardiogenic component to acute on chronic renal failure  On Bumex drip  Gradually improving  2  Moderate mitral stenosis  Fairly stable in the past year  3  Mild aortic stenosis  Fairly stable in the last year  4  Complete heart block status post permanent pacemaker in the past  5  Hyperlipidemia  Good control with simvastatin as outpatient  6  Paroxysmal atrial fibrillation during acute illness  Remains on Eliquis  7   HTN-Historically good blood pressure control on ARB, calcium channel blocker and beta-blocker  Currently off ARB at this time due to worsening renal function  8  MSSA bacteremia from left hallux wound status post surgery  9  Acute on chronic renal disease  10  Diabetes mellitus      Plan: Will shut off Bumex drip this evening  Start furosemide 80 mg p o  B i d   Which is above his normal dose at home  Continue beta-blocker and calcium channel blocker  Continue statin Eliquis  Hopefully can be discharged on Vishal Dear, MD

## 2020-01-26 NOTE — ASSESSMENT & PLAN NOTE
Lab Results   Component Value Date    HGBA1C 6 4 (H) 01/07/2020       Recent Labs     01/24/20  2117 01/25/20  0732 01/25/20  1102 01/25/20  1518   POCGLU 227* 122 272* 263*       Blood Sugar Average: Last 72 hrs:  (P) 994 [id number] iddm on insulin degludec-liraglutide 18 iu daily  Will transition to lantus at 9 iu qhs and start ssi/poc bs given poor appetite

## 2020-01-26 NOTE — PROGRESS NOTES
NEPHROLOGY PROGRESS NOTE   Verlyn Boas 75 y o  male MRN: [id number]  Unit/Bed#: E4 -01 Encounter: [id number]  Reason for Consult: ELZBIETA (POA) on CKD III    ASSESSMENT/PLAN:  ELZBIETA (POA):  Likely secondary to cardiorenal syndrome with component of obstructive uropathy, +/- ATN  -status post Jones catheter placement   -diuresis per Cardiology, continues on Bumex drip    - received metolazone x 1 on 01/25   - BUN continues to rise  - denies uremeic symptoms, no urgent indication for RRT  -renal ultrasound showed echogenic kidneys  -UA shows trace ketones, large blood, 300 protein, 4-10 WBCs, 4-10 rbc's, moderate bacteria, 0-1 fine granular cast, 1% eosinophils   -continue to avoid nephrotoxins  -I/O      CKD stage III:  History of nephrotic range proteinuria  -previous baseline creatinine in the low 1s  -follows with Dr Nayana Albert      Hyponatremia:  Likely secondary to volume overload  Improved  -will continue to monitor   -continues on Bumex drip   -continue fluid restriction      Hypokalemia:  Likely secondary to diuretics  -continues on standing oral potassium replacement   -will continue to monitor potassium and magnesium levels      Volume overload:  EF of 55%  -continue diuresis per Cardiology   -continues on IV Bumex drip  Would recommend transitioning to intermittent diuretics in the next 24-48 hours  -weight decreased 4 lb from previous day  - S/P metolazone x 1 01/25    - continue fluid restriction 15 L/day  - I/O, daily weights    -chest x-ray shows partial improvement pulmonary edema bilateral effusions      Blood pressure: acceptable  - avoid hypotension or high fluctuations in BP    - goal -150 mm Hg    -continue amlodipine 10 mg daily with holding parameters for systolic blood pressure less than 130    -continue metoprolol 50 mg every 12 hours  SUBJECTIVE:  The patient is resting in bed  He denies chest pain or shortness of breath  He states that he feels great today  He denies nausea, vomiting, diarrhea  He denies issues with urination      OBJECTIVE:  Current Weight: Weight - Scale: 95 5 kg (210 lb 8 6 oz)  Vitals:    01/25/20 1900 01/25/20 2129 01/26/20 0600 01/26/20 0709   BP: 153/59 157/70  148/81   BP Location: Left arm Left arm  Left arm   Pulse: 72 74  62   Resp: 18 18  18   Temp: 97 9 °F (36 6 °C)   97 8 °F (36 6 °C)   TempSrc: Temporal   Temporal   SpO2: 100% 94%  99%   Weight:   95 5 kg (210 lb 8 6 oz)        Intake/Output Summary (Last 24 hours) at 1/26/2020 0687  Last data filed at 1/26/2020 6740  Gross per 24 hour   Intake 1206 53 ml   Output 3625 ml   Net -2418 47 ml     General: NAD  Skin: warm, dry, intact, no rash  HEENT: Moist mucous membranes, sclera anicteric, normocephalic, atraumatic  Neck: No apparent JVD appreciated  Chest:lung sounds clear B/L, on O2   CVS:Regular rate and rhythm, no murmer   Abdomen: Soft, round, non-tender, +BS  Extremities:  Improving LLE edema present, right BKA  Neuro: alert and oriented  Psych: appropriate mood and affect     Medications:    Current Facility-Administered Medications:     acetaminophen (TYLENOL) tablet 650 mg, 650 mg, Oral, Q6H PRN, Melissa Ambron, DO, 650 mg at 01/25/20 1801    allopurinol (ZYLOPRIM) tablet 300 mg, 300 mg, Oral, QAM, Milly Garay, DPM, 300 mg at 01/25/20 0842    amLODIPine (NORVASC) tablet 10 mg, 10 mg, Oral, Daily, [facility], PA-C, 10 mg at 01/25/20 9670    apixaban (ELIQUIS) tablet 5 mg, 5 mg, Oral, BID, Melissa Ambron, DO, 5 mg at 01/25/20 1748    bumetanide (BUMEX) 12 5 mg infusion 50 mL, 1 mg/hr, Intravenous, Continuous, Bean Hinds DO, Last Rate: 4 mL/hr at 01/26/20 0507, 1 mg/hr at 01/26/20 0507    ceFAZolin (ANCEF) IVPB (premix) 1,000 mg, 1,000 mg, Intravenous, Q8H, [employer], PAT, Stopped at 01/26/20 3706    docusate sodium (COLACE) capsule 100 mg, 100 mg, Oral, BID, Melissa Alonso DO, 100 mg at 01/25/20 0843    ferrous gluconate (FERGON) tablet 324 mg, 324 mg, Oral, BID AC, Melissa Ambron, DO, 324 mg at 01/26/20 0605    guaiFENesin (MUCINEX) 12 hr tablet 600 mg, 600 mg, Oral, BID, Zoran Ambrosio, DPM, 600 mg at 01/25/20 2132    insulin glargine (LANTUS) subcutaneous injection 22 Units 0 22 mL, 22 Units, Subcutaneous, HS, Melissa Ambron, DO, 22 Units at 01/25/20 2133    insulin lispro (HumaLOG) 100 units/mL subcutaneous injection 2-12 Units, 2-12 Units, Subcutaneous, TID AC, 6 Units at 01/25/20 1633 **AND** Fingerstick Glucose (POCT), , , TID AC, Zoran Ambrosio, DPM    ipratropium-albuterol (DUO-NEB) 0 5-2 5 mg/3 mL inhalation solution 3 mL, 3 mL, Nebulization, Q6H PRN, Zoran Ambrosio, DPM, 3 mL at 01/19/20 1243    LORazepam (ATIVAN) tablet 0 5 mg, 0 5 mg, Oral, Q8H PRN, Melissa Ambron, DO, 0 5 mg at 01/25/20 1804    melatonin tablet 6 mg, 6 mg, Oral, HS, Zoran Ambrosio, DPM, 6 mg at 01/25/20 2132    metoprolol tartrate (LOPRESSOR) tablet 50 mg, 50 mg, Oral, Q12H [address], Zoran Ambrosio, DPM, 50 mg at 01/25/20 2132    oxyCODONE (ROXICODONE) IR tablet 5 mg, 5 mg, Oral, Q4H PRN, Zoran Ambrosio, DPM, 5 mg at 01/12/20 1709    potassium chloride (K-DUR,KLOR-CON) CR tablet 20 mEq, 20 mEq, Oral, BID, Rujul Hinds, DO, 20 mEq at 01/25/20 1748    pravastatin (PRAVACHOL) tablet 40 mg, 40 mg, Oral, Daily With Laban Moder, DPM, 40 mg at 01/25/20 1638    tamsulosin (FLOMAX) capsule 0 4 mg, 0 4 mg, Oral, Daily With Laban Moder, DPM, 0 4 mg at 01/25/20 1637    Laboratory Results:  Results from last 7 days   Lab Units 01/25/20  0618 01/24/20  1349 01/23/20  0626 01/22/20  0505 01/21/20  0542 01/20/20  0617 01/20/20  0100   WBC Thousand/uL 5 43 10 48* 11 59* 10 18* 12 20*  --   --    HEMOGLOBIN g/dL 15 0 8 6* 8 1* 7 5* 7 1*  --   --    HEMATOCRIT % 46 0 26 4* 24 8* 22 9* 21 5*  --   --    PLATELETS Thousands/uL 248 387 399* 379 394*  --   --    POTASSIUM mmol/L 3 5 4 2 3 3* 3 3* 3 4* 3 6 3 7   CHLORIDE mmol/L 97* 95* 97* 98* 98* 95* 97*   CO2 mmol/L 28 27 26 23 22 21 22   BUN mg/dL 131* 131* 128* 122* 118* 110* 114*   CREATININE mg/dL 2 44* 2 59* 2 58* 2 75* 2 87* 2 96* 2 98*   CALCIUM mg/dL 9 0 8 8 8 8 8 4 8 3 8 3 8 0*   MAGNESIUM mg/dL 1 7  --   --  2 3  --   --   --

## 2020-01-27 PROBLEM — B95.61 STAPHYLOCOCCUS AUREUS BACTEREMIA: Status: ACTIVE | Noted: 2020-01-08

## 2020-01-27 LAB
ANION GAP SERPL CALCULATED.3IONS-SCNC: 8 MMOL/L (ref 4–13)
BUN SERPL-MCNC: 140 MG/DL (ref 5–25)
CALCIUM SERPL-MCNC: 9 MG/DL (ref 8.3–10.1)
CHLORIDE SERPL-SCNC: 99 MMOL/L (ref 100–108)
CO2 SERPL-SCNC: 30 MMOL/L (ref 21–32)
CREAT SERPL-MCNC: 2.24 MG/DL (ref 0.6–1.3)
GFR SERPL CREATININE-BSD FRML MDRD: 29 ML/MIN/1.73SQ M
GLUCOSE SERPL-MCNC: 171 MG/DL (ref 65–140)
GLUCOSE SERPL-MCNC: 198 MG/DL (ref 65–140)
GLUCOSE SERPL-MCNC: 229 MG/DL (ref 65–140)
GLUCOSE SERPL-MCNC: 72 MG/DL (ref 65–140)
GLUCOSE SERPL-MCNC: 91 MG/DL (ref 65–140)
POTASSIUM SERPL-SCNC: 3.4 MMOL/L (ref 3.5–5.3)
SODIUM SERPL-SCNC: 137 MMOL/L (ref 136–145)

## 2020-01-27 PROCEDURE — 97530 THERAPEUTIC ACTIVITIES: CPT

## 2020-01-27 PROCEDURE — 99232 SBSQ HOSP IP/OBS MODERATE 35: CPT | Performed by: INTERNAL MEDICINE

## 2020-01-27 PROCEDURE — 99232 SBSQ HOSP IP/OBS MODERATE 35: CPT

## 2020-01-27 PROCEDURE — 82948 REAGENT STRIP/BLOOD GLUCOSE: CPT

## 2020-01-27 PROCEDURE — 99233 SBSQ HOSP IP/OBS HIGH 50: CPT | Performed by: INTERNAL MEDICINE

## 2020-01-27 PROCEDURE — 80048 BASIC METABOLIC PNL TOTAL CA: CPT | Performed by: NURSE PRACTITIONER

## 2020-01-27 PROCEDURE — 94660 CPAP INITIATION&MGMT: CPT

## 2020-01-27 RX ORDER — CEFAZOLIN SODIUM 1 G/50ML
1000 SOLUTION INTRAVENOUS EVERY 8 HOURS
Status: DISCONTINUED | OUTPATIENT
Start: 2020-01-27 | End: 2020-01-29 | Stop reason: HOSPADM

## 2020-01-27 RX ORDER — POTASSIUM CHLORIDE 20 MEQ/1
40 TABLET, EXTENDED RELEASE ORAL ONCE
Status: COMPLETED | OUTPATIENT
Start: 2020-01-27 | End: 2020-01-27

## 2020-01-27 RX ADMIN — APIXABAN 5 MG: 5 TABLET, FILM COATED ORAL at 17:25

## 2020-01-27 RX ADMIN — LORAZEPAM 0.5 MG: 1 TABLET ORAL at 21:26

## 2020-01-27 RX ADMIN — TAMSULOSIN HYDROCHLORIDE 0.4 MG: 0.4 CAPSULE ORAL at 17:25

## 2020-01-27 RX ADMIN — APIXABAN 5 MG: 5 TABLET, FILM COATED ORAL at 08:08

## 2020-01-27 RX ADMIN — FERROUS GLUCONATE 324 MG: 324 TABLET ORAL at 06:00

## 2020-01-27 RX ADMIN — INSULIN LISPRO 2 UNITS: 100 INJECTION, SOLUTION INTRAVENOUS; SUBCUTANEOUS at 11:44

## 2020-01-27 RX ADMIN — GUAIFENESIN 600 MG: 600 TABLET ORAL at 21:26

## 2020-01-27 RX ADMIN — MELATONIN TAB 3 MG 6 MG: 3 TAB at 21:27

## 2020-01-27 RX ADMIN — ALLOPURINOL 300 MG: 100 TABLET ORAL at 08:08

## 2020-01-27 RX ADMIN — AMLODIPINE BESYLATE 10 MG: 10 TABLET ORAL at 08:08

## 2020-01-27 RX ADMIN — ACETAMINOPHEN 650 MG: 325 TABLET ORAL at 11:50

## 2020-01-27 RX ADMIN — FUROSEMIDE 80 MG: 80 TABLET ORAL at 17:25

## 2020-01-27 RX ADMIN — INSULIN GLARGINE 22 UNITS: 100 INJECTION, SOLUTION SUBCUTANEOUS at 21:26

## 2020-01-27 RX ADMIN — CEFAZOLIN SODIUM 1000 MG: 1 SOLUTION INTRAVENOUS at 13:09

## 2020-01-27 RX ADMIN — POTASSIUM CHLORIDE 20 MEQ: 1500 TABLET, EXTENDED RELEASE ORAL at 17:25

## 2020-01-27 RX ADMIN — FUROSEMIDE 80 MG: 80 TABLET ORAL at 08:08

## 2020-01-27 RX ADMIN — POTASSIUM CHLORIDE 20 MEQ: 1500 TABLET, EXTENDED RELEASE ORAL at 08:08

## 2020-01-27 RX ADMIN — INSULIN LISPRO 2 UNITS: 100 INJECTION, SOLUTION INTRAVENOUS; SUBCUTANEOUS at 17:26

## 2020-01-27 RX ADMIN — PRAVASTATIN SODIUM 40 MG: 40 TABLET ORAL at 17:27

## 2020-01-27 RX ADMIN — CEFAZOLIN SODIUM 1000 MG: 1 SOLUTION INTRAVENOUS at 02:14

## 2020-01-27 RX ADMIN — GUAIFENESIN 600 MG: 600 TABLET ORAL at 08:08

## 2020-01-27 RX ADMIN — ACETAMINOPHEN 650 MG: 325 TABLET ORAL at 21:26

## 2020-01-27 RX ADMIN — POTASSIUM CHLORIDE 40 MEQ: 1500 TABLET, EXTENDED RELEASE ORAL at 11:48

## 2020-01-27 RX ADMIN — METOPROLOL TARTRATE 50 MG: 50 TABLET, FILM COATED ORAL at 08:08

## 2020-01-27 RX ADMIN — METOPROLOL TARTRATE 50 MG: 50 TABLET, FILM COATED ORAL at 21:26

## 2020-01-27 RX ADMIN — FERROUS GLUCONATE 324 MG: 324 TABLET ORAL at 17:26

## 2020-01-27 RX ADMIN — CEFAZOLIN SODIUM 1000 MG: 1 SOLUTION INTRAVENOUS at 21:26

## 2020-01-27 NOTE — PROGRESS NOTES
Progress Note - Fco Zaldivar 1955, 75 y o  male MRN: [id number]    Unit/Bed#: E4 -01 Encounter: [id number]    Primary Care Provider: Urbano Hatchet, DO   Date and time admitted to hospital: 1/7/2020 11:29 AM        Anxiety  Assessment & Plan  Ativan PRN    Iron deficiency anemia  Assessment & Plan  S/P one unit of PRBC's - HB stable    Type 2 diabetes mellitus with diabetic neuropathy, without long-term current use of insulin [facility])  Assessment & Plan  Lab Results   Component Value Date    HGBA1C 6 4 (H) 01/07/2020       Recent Labs     01/25/20  2059 01/26/20  0707 01/26/20  1059 01/26/20  1557   POCGLU 197* 95 211* 199*       Blood Sugar Average: Last 72 hrs:  (P) 148 [id number]     SSI and basal bolus insulin    Elevated troponin I level  Assessment & Plan  W/o cp  Suspect type 2 MI from hypoxia vs nonspecific troponin elevation w/underlying CHF and ELZBIETA  ekg stable and paced  Monitor troponins for completeness will consult cardiology    Chronic diastolic (congestive) heart failure (HCC)  Assessment & Plan  Wt Readings from Last 3 Encounters:   01/26/20 95 5 kg (210 lb 8 6 oz)   12/13/19 96 2 kg (212 lb)   12/03/19 97 5 kg (215 lb)     Now with acute exacerbation - On Bumex drip  Repeat labs in the am    ELBERT w/LVEF 60% but moderate to severe mitral stenosis  Appreciate cardiology recommendations  Anemia  Assessment & Plan  - Hemoccult stools  Tranfuse for HB less than 7   No evidence of melena or blood loss  Repeat in am      Acute renal failure superimposed on stage 3 chronic kidney disease ([facility] [address] )  Assessment & Plan  Suspected elzbieta on ckd  ELZBIETA may be due to illness and poor oral intake  Notable Uremia    Essential hypertension  Assessment & Plan  Hold cozaar/ continue metoprolol and amlodopine      Type 2 diabetes mellitus with chronic kidney disease, without long-term current use of insulin [facility])  Assessment & Plan  Lab Results   Component Value Date    HGBA1C 6 4 (H) 01/07/2020 Recent Labs     01/25/20 2059 01/26/20  0707 01/26/20  1059 01/26/20  1557   POCGLU 197* 95 211* 199*       Blood Sugar Average: Last 72 hrs:  (P) 509 [id number] iddm on insulin degludec-liraglutide 18 iu daily  Will transition to lantus at 9 iu qhs and start ssi/poc bs given poor appetite  Acute diastolic (congestive) heart failure (HCC)  Assessment & Plan  Wt Readings from Last 3 Encounters:   01/26/20 95 5 kg (210 lb 8 6 oz)   12/13/19 96 2 kg (212 lb)   12/03/19 97 5 kg (215 lb)       Weights improving, still with 3+ edema  - Transitioned to home dose of Furosemide 80 mg BID      NICOLASA (obstructive sleep apnea)  Assessment & Plan  Continue cpap qhs  Urinary retention  Assessment & Plan  Jones removed, urinating with no problems  Resolved, coninue Flomax    PAF (paroxysmal atrial fibrillation) ([facility])  Assessment & Plan  Ventricular Rate controlled and paced rhythm, Continue lopressor/eliquis    Severe sepsis [facility])  Assessment & Plan  Patient with MSSA Bacteremia from left hallux wound  He remains on Ancef, he has received a PICC line  He did have a ELBERT that was negative for vegetation, he is status post left hallux amputation on 01/08/20/2020 as well as incision and drainage removal of the sesamoid bone on 01/12/2020  This appears to be the nidus of infection  Appreciate Podiatry recommendations  He will continue Ancef through to 01/20/2020  During this time he will need weekly CBCs in creatinine while on intravenous antibiotic    POD 16 today for left hallux amputation  ID following - anticipate dc 1/28/2020 with home infusion services    Complete heart block (HCC)  Assessment & Plan  S/p ppm  Monitored on telemetry    Uremia  Assessment & Plan  Azotemia in the setting of diuretics - 145, not symptomatic - Appreciate Nephrology- Bumex drip discontinued and should improve  Daily BMP    Eczema  Assessment & Plan  Stable discrete plaques over back w/silver scaling    Pruritic non painful  Continue op dupixent twice weekly upon d/c  Op f/u with derm  * Acute respiratory failure with hypoxia (HCC)  Assessment & Plan  Acute hypoxemic respiratory failure in the setting of acute on chronic diastolic congestive heart failure  Off Bumex and transitioned to Bumex 80 mg BID     CXR improved  [facility] Progress Note  Patient: Concepcion Jason 75 y o  male   MRN: [id number]  PCP: Shweta Whitaker DO  Unit/Bed#: E4 -01 Encounter: [id number]  Date Of Visit: 01/26/20    Assessment:    Principal Problem:    Acute respiratory failure with hypoxia ([address] )  Active Problems:    Eczema    Uremia    Complete heart block (HCC)    Severe sepsis (HCC)    PAF (paroxysmal atrial fibrillation) (HCC)    Urinary retention    NICOLASA (obstructive sleep apnea)    Acute diastolic (congestive) heart failure (HCC)    Type 2 diabetes mellitus with chronic kidney disease, without long-term current use of insulin (HCC)    Essential hypertension    Acute renal failure superimposed on stage 3 chronic kidney disease (HCC)    Anemia    Chronic diastolic (congestive) heart failure (HCC)    Elevated troponin I level    Type 2 diabetes mellitus with diabetic neuropathy, without long-term current use of insulin (HCC)    Iron deficiency anemia    Anxiety      Plan:    · Transition to Lasix  · Discharge planning  · Hopefully discharge within the next 24-48  · Continue antibiotic  · Monitor labs       VTE Pharmacologic Prophylaxis:   Pharmacologic: Apixaban (Eliquis)  Mechanical VTE Prophylaxis in Place: Yes    Patient Centered Rounds: I have performed bedside rounds with nursing staff today  Discussions with Specialists or Other Care Team Provider:  Nephrology    Education and Discussions with Family / Patient:  Patient    Time Spent for Care: 30 minutes  More than 50% of total time spent on counseling and coordination of care as described above      Current Length of Stay: 19 day(s)    Current Patient Status: Inpatient   Certification Statement: The patient will continue to require additional inpatient hospital stay due to Transition to oral furosemide    Discharge Plan / Estimated Discharge Date:  48 hours    Code Status: Level 1 - Full Code      Subjective:   Patient seen and examined, breathing is improved  States he feels great  A complete and comprehensive 14 point organ system review has been performed and all other systems are negative other than stated above  Objective:     Vitals:   Temp (24hrs), Av 2 °F (36 8 °C), Min:97 8 °F (36 6 °C), Max:98 6 °F (37 °C)    Temp:  [97 8 °F (36 6 °C)-98 6 °F (37 °C)] 98 6 °F (37 °C)  HR:  [62-74] 63  Resp:  [18] 18  BP: (148-160)/(70-81) 160/70  SpO2:  [94 %-99 %] 97 %  Body mass index is 30 48 kg/m²  Input and Output Summary (last 24 hours): Intake/Output Summary (Last 24 hours) at 2020 1910  Last data filed at 2020 1728  Gross per 24 hour   Intake 1326 53 ml   Output 3775 ml   Net -2448  47 ml       Physical Exam:     General: well appearing, no acute distress  HEENT: atraumatic, PERRLA, moist mucosa, normal pharynx, normal tonsils and adenoids, normal tongue, no fluid in sinuses  Neck: Trachea midline, no carotid bruit, no masses  Respiratory: normal chest wall expansion, CTA B, no r/r/w, no rubs  Cardiovascular:  Irregular regular, 3/6 systolic ejection murmur  Abdomen: Soft, non-tender, non-distended, normal bowel sounds in all quadrants, no hepatosplenomegaly, no tympany  Rectal: deferred  Musculoskeletal: normal ROM in upper and lower extremities, 3+ edema  Integumentary: warm, dry, and pink, with no rash, purpura, or petechia  Heme/Lymph: no lymphadenopathy, no bruises  Neurological: Cranial Nerves II-XII grossly intact, no tics, normal sensation to pressure and light touch  Psychiatric: cooperative with normal mood, affect, and cognition      Additional Data:     Labs:    Results from last 7 days   Lab Units 20  0618   WBC Thousand/uL 5 43   HEMOGLOBIN g/dL 15 0   HEMATOCRIT % 46 0   PLATELETS Thousands/uL 248   NEUTROS PCT % 63   LYMPHS PCT % 15   MONOS PCT % 10   EOS PCT % 11*     Results from last 7 days   Lab Units 01/26/20  0855   POTASSIUM mmol/L 3 6   CHLORIDE mmol/L 96*   CO2 mmol/L 29   BUN mg/dL 143*   CREATININE mg/dL 2 45*   CALCIUM mg/dL 8 9           * I Have Reviewed All Lab Data Listed Above  * Additional Pertinent Lab Tests Reviewed: Jess 66 Admission Reviewed    Imaging:    No new imaging    Recent Cultures (last 7 days):           Last 24 Hours Medication List:     Current Facility-Administered Medications:  acetaminophen 650 mg Oral Q6H PRN Melissa Ambron, DO    allopurinol 300 mg Oral QAM Dani Montez DPM    amLODIPine 10 mg Oral Daily [address], MOE    apixaban 5 mg Oral BID Melissa Ambron, DO    cefazolin 1,000 mg Intravenous [facility] Last Rate: 1,000 mg (01/26/20 1730)   docusate sodium 100 mg Oral BID Melissa Ambron, DO    ferrous gluconate 324 mg Oral BID AC Melissa Ambron, DO    furosemide 80 mg Oral BID (diuretic) Adonis Swift MD    guaiFENesin 600 mg Oral BID Dani Montez DPM    insulin glargine 22 Units Subcutaneous HS Melissa Celeste, DO    insulin lispro 2-12 Units Subcutaneous TID AC Dani Montez DPM    ipratropium-albuterol 3 mL Nebulization Q6H PRN Dani Montez, LIZBETH    LORazepam 0 5 mg Oral Q8H PRN Melissa Ambron, DO    melatonin 6 mg Oral HS Dani Montez DPM    metoprolol tartrate 50 mg Oral Q12H [address] Julieth[place] Tc, LIZBETH    oxyCODONE 5 mg Oral Q4H PRN Dani Montez DPM    potassium chloride 20 mEq Oral BID Ruarnaldo Hinds,     pravastatin 40 mg Oral Daily With Toro Leiva Cha DPM    tamsulosin 0 4 mg Oral Daily With Shai Gaffney DPM         Today, Patient Was Seen By: Srinivas Gregg DO    ** Please Note: This note has been constructed using a voice recognition system   **

## 2020-01-27 NOTE — PHYSICIAN ADVISOR
Current patient class: Inpatient  The patient is currently on Hospital Day: 21      The patient was admitted to the hospital at 1319 on 1/7/20 for the following diagnosis:  Chills [R68 83]  Leucocytosis [D72 829]  Sore throat [J02 9]  Fatigue [R53 83]  Elevated troponin [R79 89]  ELZBIETA (acute kidney injury) ([facility] [address] ) [N17 9]  Elevated brain natriuretic peptide (BNP) level [R79 89]  Elevated troponin I level [J09 52]  Chronic diastolic (congestive) heart failure (HCC) [I50 32]     CMS OUTLIER STAY REVIEW    After review of the relevant documentation, labs, vital signs and test results, the patient is appropriate for CONTINUED INPATIENT ADMISSION  The patient continues to remain hospitalized receiving acute medical care  The patient has surpassed the expected duration of stay, however given the clinical condition, need for further acute care management, the patient is appropriate to remain in an inpatient status  The patient still being actively managed, and does have unresolved medical issues requiring further hospitalization  This review is conducted at 10 day intervals, to help satisfy the requirements for significant outlier stay review as per CMS  Given the current condition of this patient, the patient satisfies this review was determination for continued inpatient stay  Rationale is as follows:       The patient is a 75 yrs old Male who presented to the ED at 1/7/2020 11:29 AM with a chief complaint of Fatigue (Pt with multiple complaints: sore throat, diarrhea, fatigue, fevers, tylenol at 0930  -ill contacts  )  Patient came with acute bronchitis and acute on chronic CHF exacerbation  The patient received IV Lasix as well as azithromycin  Patient was also noted to be uremic and the patient also was found to have acute renal insufficiency  Patient was thought to have severe sepsis as well and was put on IV Ancef and the patient did come back positive for MSSA bacteremia    Patient had a hallux amputation on 1/8/2020 as well as incision and drainage and removal of the sesamoid bone on 1/12/2020  Up until yesterday the patient was still receiving IV Lasix secondary to acute diastolic congestive heart failure  Given all of the above and the patient's need for IV antibiotics in a bacteremic patient with MSSA say bacteremia as well as acute congestive heart failure needing monitoring of fluid status the patient is still inpatient status appropriate      The patients vitals on arrival were ED Triage Vitals   Temperature Pulse Respirations Blood Pressure SpO2   01/07/20 1118 01/07/20 1118 01/07/20 1118 01/07/20 1118 01/07/20 1118   99 1 °F (37 3 °C) 76 18 122/59 91 %      Temp Source Heart Rate Source Patient Position - Orthostatic VS BP Location FiO2 (%)   01/07/20 1118 01/07/20 1118 01/07/20 1312 01/07/20 1118 01/07/20 2138   Temporal Monitor Lying Right arm 50      Pain Score       01/07/20 1118       No Pain           Past Medical History:   Diagnosis Date    Arthritis     OA    Bruise of both arms     forearms and both hands    Bruises easily     CH[facility]congestive [facility] failure) [facility]     Diabetes mellitus ([address] )     Diabetic foot ulcer ([facility]     Eczema     Erectile dysfunction     Gout     Hyperlipidemia     Hypertension     Murmur     Nephropathy     Osteoarthritis     PAD (peripheral artery disease) ([facility]     Seasonal allergies     Toe infection     bilat great toes    Vertigo     Walks frequently     Wears dentures     upper    Wears glasses      Past Surgical History:   Procedure Laterality Date    CARDIAC PACEMAKER PLACEMENT      CARPAL TUNNEL RELEASE Left     CARPAL TUNNEL RELEASE Right     CARPAL TUNNEL RELEASE      INCISION AND DRAINAGE OF WOUND Left 1/12/2020    Procedure: INCISION AND DRAINAGE (I&D) EXTREMITY AND REMOVAL OF SESMOID BONE;  Surgeon: Juliocesar Craft DPM;  [facility];  Service: Podiatry    IR PICC REPO  1/14/2020    KNEE ARTHROSCOPY Left     KNEE ARTHROSCOPY Right     KNEE SURGERY      OR AMPUTATION TOE,I-P JT Bilateral 5/2/2017    Procedure: PARTIAL AMPUTATION RIGHT AND LEFT HALLUX ;  Surgeon: Senthil Freeman DPM;  [facility];  Service: Podiatry    OR AMPUTATION TOE,MT-P JT Left 7/25/2017    Procedure: 2ND TOE AMPUTATION;  Surgeon: Senthil Freeman DPM;  [facility];  Service: Podiatry    SHOULDER ARTHROSCOPY Left     with screws,RTC    SHOULDER SURGERY      TOE AMPUTATION Left 1/8/2020    Procedure: HALLUX AMPUTATION;  Surgeon: Laly Harris DPM;  [facility];  Service: Podiatry    VASECTOMY             Consults have been placed to:   IP CONSULT TO CASE MANAGEMENT  IP CONSULT TO CARDIOLOGY  IP CONSULT TO PODIATRY  IP CONSULT TO INFECTIOUS DISEASES  IP CONSULT TO NUTRITION SERVICES  IP CONSULT TO PICC TEAM  IP CONSULT TO NEPHROLOGY    Vitals:    01/26/20 2100 01/27/20 0600 01/27/20 0700 01/27/20 1500   BP: 155/63  169/70 154/70   BP Location: Right arm  Left arm Left arm   Pulse: 74  61 62   Resp: 18  19 18   Temp: 98 8 °F (37 1 °C)  97 6 °F (36 4 °C) 97 6 °F (36 4 °C)   TempSrc: Tympanic  Temporal Tympanic   SpO2: 91%  98% 100%   Weight:  95 7 kg (210 lb 15 7 oz)         Most recent labs:    Recent Labs     01/25/20  0618  01/27/20  0537   WBC 5 43  --   --    HGB 15 0  --   --    HCT 46 0  --   --      --   --    K 3 5   < > 3 4*   CALCIUM 9 0   < > 9 0   *   < > 140*   CREATININE 2 44*   < > 2 24*    < > = values in this interval not displayed         Scheduled Meds:  Current Facility-Administered Medications:  acetaminophen 650 mg Oral Q6H PRN Melissa Alonso DO    allopurinol 300 mg Oral QAM EDWIN BustamanteM    amLODIPine 10 mg Oral Daily [facility], MOE    apixaban 5 mg Oral BID Melissa Alonso, DO    cefazolin 1,000 mg Intravenous Q8H Jorge Wells DO Last Rate: 1,000 mg (01/27/20 1309)   docusate sodium 100 mg Oral BID Melissa Alonso DO    ferrous gluconate 324 mg Oral BID AC Melissa Alonso DO furosemide 80 mg Oral BID (diuretic) Phillip Concepcion MD    guaiFENesin 600 mg Oral BID Rosan Clause, DPM    insulin glargine 22 Units Subcutaneous HS Melissa Ambron, DO    insulin lispro 2-12 Units Subcutaneous TID AC Rosan Clause, DPM    ipratropium-albuterol 3 mL Nebulization Q6H PRN Rosan Clause, DPM    LORazepam 0 5 mg Oral Q8H PRN Melissa Ambron, DO    melatonin 6 mg Oral HS Rosan Clause, DPM    metoprolol tartrate 50 mg Oral Q12H [address] Rosan Clause, DPM    oxyCODONE 5 mg Oral Q4H PRN Rosachaim Sortouse, DPM    potassium chloride 20 mEq Oral BID Rujul Hinds, DO    pravastatin 40 mg Oral Daily With Claire Peterson Cha DPM    tamsulosin 0 4 mg Oral Daily With Claire Peterson Cha, DPM      Continuous Infusions:   PRN Meds:   acetaminophen    ipratropium-albuterol    LORazepam    oxyCODONE    Surgical procedures (if appropriate):  Procedure(s):  INCISION AND DRAINAGE (I&D) EXTREMITY AND REMOVAL OF SESMOID BONE

## 2020-01-27 NOTE — PROGRESS NOTES
Follow up Consultation    Nephrology   Yana Yepez 75 y o  male MRN: [id number]  Unit/Bed#: E4 -01 Encounter: [id number]      Physician Requesting Consult: Hilaria Benson DO  Reason for Consult:  Acute kidney injury       ASSESSMENT/PLAN:    1)Acute kidney injury (POA) on CKD stage 3:  - ELZBIETA most likely secondary to cardiorenal syndrome with component of obstructive uropathy likely leading to ATN  - After review of records it appears that the patient has a baseline Creatinine of 1-1 5 mg/dL  - patient's creatinine today is at 2 24 mg/dL stable and improved from yesterday  - Avoid nephrotoxins, adjust meds to appropriate GFR   - Acid base and lytes stable   - clinically patient appears to be hypervolemic  - diuretics per Cardiology  - was on Bumex drip and now on p o  Lasix as of 01/26/2020 of 80 mg p o  B i d   - status post metolazone x1 on 01/25/2020  - Clinically patient is not uremic and there is no acute indication for renal replacement therapy (dialysis)  - Optimize hemodynamic status to avoid delay in renal recovery  - check BMP, magnesium, phosphorus in a m   - Await renal recovery  - Place on a renal diet when allowed diet order    - Strict I/O  - patient will need follow-up with Nephrology as an outpatient upon discharge with labs in 1 week    - patient follows up with Sarina Mora for nephrology as an outpatient    2)Blood pressure/hypertension:  - Optimize hemodynamics   - Maintain MAP > 65mmHg  - Avoid BP fluctuations  - on Norvasc 10 mg p o   Q day and metoprolol 50 mg p o  B i d     3)H/H/anemia:  - most recent hemoglobin at 15 grams/deciliter  - maintain hemoglobin greater than 8 grams/deciliter  - on ferrous sulfate    4)Volume status:  - Clinically patient appears to be hypervolemic  - on Lasix 80 mg p o  B i d   - diuretics per Cardiology    5)Hyponatremia:  - Likely due to decreased free water clearance  - Likely due to decreased distal sodium delivery  - Continue to monitor for now  - continue fluid restriction    6)CHF:  - Management as per primary team  - most recent EF of 55%  - follow-up with cardiology  - continue daily weights and intake and now take    7) hypokalemia:  - check BMP  - on potassium supplements    8) BPH/obstruction:  - on Flomax  - Jones has been removed    Thanks for the consult  Will continue to follow  Please call with questions/ concerns  Above-mentioned orders and Plan in terms of acute kidney injury was discussed with the team in [address] MD, [facility], 2020, 11:28 AM              Objective :   Patient seen examined in his room no overnight events hemodynamically stable urine output close to 3 L last 24 hours remains afebrile happy that renal parameters are improving  Losing weight still continues to have good urine output with p o  Lasix  PHYSICAL EXAM  /70 (BP Location: Left arm)   Pulse 61   Temp 97 6 °F (36 4 °C) (Temporal)   Resp 19   Wt 95 7 kg (210 lb 15 7 oz)   SpO2 98%   BMI 30 55 kg/m²   Temp (24hrs), Av 3 °F (36 8 °C), Min:97 6 °F (36 4 °C), Max:98 8 °F (37 1 °C)        Intake/Output Summary (Last 24 hours) at 2020 1128  Last data filed at 2020 0806  Gross per 24 hour   Intake 1230 ml   Output 2400 ml   Net -1170 ml       I/O last 24 hours: In: 0653 [P O :1360; I V :60; IV Piggyback:50]  Out: 3000 [Urine:3000]      Current Weight: Weight - Scale: 95 7 kg (210 lb 15 7 oz)  First Weight: Weight - Scale: 97 2 kg (214 lb 4 6 oz)  Physical Exam   Constitutional: He is oriented to person, place, and time  He appears well-developed and well-nourished  No distress  HENT:   Head: Normocephalic and atraumatic  Mouth/Throat: Oropharynx is clear and moist  No oropharyngeal exudate  Eyes: Conjunctivae are normal  No scleral icterus  Neck: Neck supple  No JVD present  No tracheal deviation present  No thyromegaly present  Cardiovascular: Normal heart sounds  Exam reveals no friction rub     Pulmonary/Chest: Effort normal  He has no wheezes  He has no rales  Abdominal: Soft  He exhibits no distension and no mass  There is no tenderness  Musculoskeletal: He exhibits edema  Plus one edema bilateral lower extremities   Neurological: He is alert and oriented to person, place, and time  Skin: Skin is warm  He is not diaphoretic  No pallor  Psychiatric: He has a normal mood and affect  His behavior is normal    Nursing note and vitals reviewed  Review of Systems   Constitutional: Negative for appetite change, chills, fatigue and fever  HENT: Negative for congestion  Respiratory: Negative for cough, shortness of breath and wheezing  Cardiovascular: Positive for leg swelling  Negative for chest pain  Gastrointestinal: Negative for abdominal pain, constipation, diarrhea, nausea and vomiting  Genitourinary: Negative for difficulty urinating and flank pain  Musculoskeletal: Negative for back pain  Skin: Negative for rash  Neurological: Negative for dizziness and headaches  Psychiatric/Behavioral: Negative for agitation and confusion  All other systems reviewed and are negative        Scheduled Meds:  Current Facility-Administered Medications:  acetaminophen 650 mg Oral Q6H PRN Melissa Ambron, DO   allopurinol 300 mg Oral QAM Burnjeremiah Hines, DPM   amLODIPine 10 mg Oral Daily Su Bernard PA-C   apixaban 5 mg Oral BID Melissa Ambron, DO   cefazolin 1,000 mg Intravenous Q8H Jorge Wells DO   docusate sodium 100 mg Oral BID Melissa Ambron, DO   ferrous gluconate 324 mg Oral BID AC Melissa Ambron, DO   furosemide 80 mg Oral BID (diuretic) Isabel Sterling MD   guaiFENesin 600 mg Oral BID Burnis Arnt, DPM   insulin glargine 22 Units Subcutaneous HS Melissa Ambron, DO   insulin lispro 2-12 Units Subcutaneous TID AC Burnis Arnt, DPM   ipratropium-albuterol 3 mL Nebulization Q6H PRN Burnis Arnt, DPM   LORazepam 0 5 mg Oral Q8H PRN Melissa Ambron, DO   melatonin 6 mg Oral HS Burnis Arnt, DPM   metoprolol tartrate 50 mg Oral Q12H [facility] Donnamarie Michelle, DPM   oxyCODONE 5 mg Oral Q4H PRN Donnamarie Michelle, DPM   potassium chloride 20 mEq Oral BID Bean Hinds DO   potassium chloride 40 mEq Oral Once Jade Currie PA-C   pravastatin 40 mg Oral Daily With Celine Escobar Cha, DPM   tamsulosin 0 4 mg Oral Daily With Shane Perez, DPM       PRN Meds:   acetaminophen    ipratropium-albuterol    LORazepam    oxyCODONE    Continuous Infusions:       Invasive Devices: Invasive Devices     Peripherally Inserted Central Catheter Line            PICC Line 73/13/83 Right Basilic 13 days                  LABORATORY:    Results from last 7 days   Lab Units 01/27/20  0537 01/26/20  0855 01/25/20  0618 01/24/20  1349 01/23/20  0626 01/22/20  0505 01/21/20  0542   WBC Thousand/uL  --   --  5 43 10 48* 11 59* 10 18* 12 20*   HEMOGLOBIN g/dL  --   --  15 0 8 6* 8 1* 7 5* 7 1*   HEMATOCRIT %  --   --  46 0 26 4* 24 8* 22 9* 21 5*   PLATELETS Thousands/uL  --   --  248 387 399* 379 394*   POTASSIUM mmol/L 3 4* 3 6 3 5 4 2 3 3* 3 3* 3 4*   CHLORIDE mmol/L 99* 96* 97* 95* 97* 98* 98*   CO2 mmol/L 30 29 28 27 26 23 22   BUN mg/dL 140* 143* 131* 131* 128* 122* 118*   CREATININE mg/dL 2 24* 2 45* 2 44* 2 59* 2 58* 2 75* 2 87*   CALCIUM mg/dL 9 0 8 9 9 0 8 8 8 8 8 4 8 3   MAGNESIUM mg/dL  --   --  1 7  --   --  2 3  --       rest all reviewed    RADIOLOGY:  XR chest pa & lateral   Final Result by Taylor Mcpherson MD (01/24 1333)      Partial improvement of pulmonary edema and bilateral effusions            Workstation performed: [id number]         VAS upper limb venous duplex scan, unilateral/limited   Final Result by Mitch Waldron DO (01/22 9064)      XR chest portable   Final Result by Garcia Watson MD (01/17 4227)   Stable tubes and lines without pneumothorax  Persistent CHF with asymmetric airspace opacities and left basilar effusion              Workstation performed: [id number]         XR chest portable   Final Result by Daria Rodriguez MD (01/20 1148)      Persistent pulmonary edema with increasing left pleural effusion            Workstation performed: [id number]         VAS lower limb venous duplex study, unilateral/limited   Final Result by Roxanne Vinson MD (01/20 1645)      US kidney and bladder   Final Result by Billy Bourne MD (01/20 0701)         1  Echogenic kidneys consistent with medical renal disease  No hydronephrosis  2   Simple cyst within the midpole of the right kidney  Workstation performed: [id number]         XR chest portable   Final Result by Bella Crawford MD (01/15 1407)      Left pleural effusion appears somewhat larger than the prior study  No other significant interval change            Workstation performed: [id number]         IR PICC repo   Final Result by Jose Garcia MD (01/14 1446)      Spontaneous repositioning of a right arm PICC with tip now in the superior vena cava  PICC is okay to use for durable venous access  Workstation performed: [id number]         VAS lower limb arterial duplex, complete bilateral   Final Result by Kristy Seip, MD (01/14 1056)      XR chest PICC line portable   Final Result by Elian Ramirez MD (01/13 1351)      Right PICC line terminating in the right internal jugular vein  Congestive heart failure  Left lower lobe consolidation  Workstation performed: [id number]         XR foot left 3+ views   Final Result by Jose Juan Nicole MD (01/13 0801)   1  Interval resection of the tibial sesamoid with expected postoperative appearance  2   Additional postoperative changes as above appear stable        Workstation performed: [id number]         XR foot left 3+ views   Final Result by Elian Ramirez MD (01/09 0820)      Status post amputation 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            Workstation performed: [id number]         XR foot 3+ vw left   Final Result by Bella Crawford MD (01/08 0970)      Suspicion of a small focus of erosion involving the distal plantar portion of the 1st proximal phalanx concerning for osteomyelitis            Workstation performed: [id number]         CT chest wo contrast   Final Result by Nicholas Myers MD (01/07 1615)      Bilateral pleural effusions and diffuse intralobular septal thickening in keeping with CHF  The study was marked in [facility] for immediate notification  Workstation performed: [id number]         XR chest 2 views   Final Result by Nicholas Myers MD (01/07 1330)      Mildly increased interstitial markings and a small left pleural effusion may indicate recurrent CHF/pulmonary edema  The study was marked in [facility] for immediate notification  Workstation performed: [id number]           Rest all reviewed    Portions of the record may have been created with voice recognition software  Occasional wrong word or "sound a like" substitutions may have occurred due to the inherent limitations of voice recognition software  Read the chart carefully and recognize, using context, where substitutions have occurred  If you have any questions, please contact the dictating provider

## 2020-01-27 NOTE — PROGRESS NOTES
Progress Note - Podiatry  Jennifer Reardon 75 y o  male MRN: [id number]  Unit/Bed#: E4 -01 Encounter: [id number]    Assessment:  1  S/p I&D with removal of sesamoid on 01/12/20 and s/p left hallux amputation on 01/08/20 due to underlying clinical OM, abscess, and possible infectious tenosynovitis   2  Sepsis - POA  3  Bacteremia likely secondary to #1  4  DM type 2       Plan:  - Left hallux incision site appears overall stable with no acute signs of infection noted  Removed proximal and distal sutures from the incision site  Pt tolerated the procedure well  Dressed with betadine and DSD  - continue NWB to RLE with surgical shoe for protection   - Continue elevation to the RLE   - IV abx as per ID recs   - pt to follow up with Dr Chaka Espinal or Dr Elissa Phipps with in 1 week of discharge  - stable from podiatric stand point  Will sign off  Please call if any questions  - Rest of care per primary service     Subjective/Objective   Chief Complaint:   Chief Complaint   Patient presents with    Fatigue     Pt with multiple complaints: sore throat, diarrhea, fatigue, fevers, tylenol at 0930  -ill contacts  Subjective: 75 y o  y/o male was seen and evaluated at bedside  Patient denies any acute events overnight  Blood pressure 169/70, pulse 61, temperature 97 6 °F (36 4 °C), temperature source Temporal, resp  rate 19, weight 95 7 kg (210 lb 15 7 oz), SpO2 98 %  ,Body mass index is 30 55 kg/m²  Invasive Devices     Peripherally Inserted Central Catheter Line            PICC Line 06/35/52 Right Basilic 14 days                Physical Exam:   General: Alert, cooperative and no distress  Lungs: Non labored breathing  Heart: Positive S1, S2  Abdomen: Soft, non-tender  Extremity:  Overall Incision site appears stable with intact central sutures  Mild serosanguineous drainage noted centrally  No acute signs of infection noted at this time  No pain upon palpation  No calf tenderness    Neurovascular musculoskeletal status at baseline  Lab, Imaging and other studies:   CBC: No results found for: WBC, HGB, HCT, MCV, PLT, ADJUSTEDWBC, MCH, MCHC, RDW, MPV, NRBC, CMP:   Lab Results   Component Value Date    SODIUM 137 01/27/2020    K 3 4 (L) 01/27/2020    CL 99 (L) 01/27/2020    CO2 30 01/27/2020     (H) 01/27/2020    CREATININE 2 24 (H) 01/27/2020    CALCIUM 9 0 01/27/2020    EGFR 29 01/27/2020       Imaging: I have personally reviewed pertinent films in PACS  EKG, Pathology, and Other Studies: I have personally reviewed pertinent reports

## 2020-01-27 NOTE — ASSESSMENT & PLAN NOTE
Wt Readings from Last 3 Encounters:   01/26/20 95 5 kg (210 lb 8 6 oz)   12/13/19 96 2 kg (212 lb)   12/03/19 97 5 kg (215 lb)       Weights improving, still with 3+ edema  - Transitioned to home dose of Furosemide 80 mg BID

## 2020-01-27 NOTE — PLAN OF CARE
Problem: Potential for Falls  Goal: Patient will remain free of falls  Description  INTERVENTIONS:  - Assess patient frequently for physical needs  -  Identify cognitive and physical deficits and behaviors that affect risk of falls    -  [place] fall precautions as indicated by assessment   - Educate patient/family on patient safety including physical limitations  - Instruct patient to call for assistance with activity based on assessment  - Modify environment to reduce risk of injury  - Consider OT/PT consult to assist with strengthening/mobility  Outcome: Progressing     Problem: PAIN - ADULT  Goal: Verbalizes/displays adequate comfort level or baseline comfort level  Description  Interventions:  - Encourage patient to monitor pain and request assistance  - Assess pain using appropriate pain scale  - Administer analgesics based on type and severity of pain and evaluate response  - Implement non-pharmacological measures as appropriate and evaluate response  - Consider cultural and social influences on pain and pain management  - Notify physician/advanced practitioner if interventions unsuccessful or patient reports new pain  Outcome: Progressing     Problem: INFECTION - ADULT  Goal: Absence or prevention of progression during hospitalization  Description  INTERVENTIONS:  - Assess and monitor for signs and symptoms of infection  - Monitor lab/diagnostic results  - Monitor all insertion sites, i e  indwelling lines, tubes, and drains  - Administer medications as ordered  - Instruct and encourage patient and family to use good hand hygiene technique  - Identify and instruct in appropriate isolation precautions for identified infection/condition   Outcome: Progressing     Problem: SAFETY ADULT  Goal: Maintain or return to baseline ADL function  Description  INTERVENTIONS:  -  Assess patient's ability to carry out ADLs; assess patient's baseline for ADL function and identify physical deficits which impact ability to perform ADLs (bathing, care of mouth/teeth, toileting, grooming, dressing, etc )  - Assess/evaluate cause of self-care deficits   - Assess range of motion  - Assess patient's mobility; develop plan if impaired  - Assess patient's need for assistive devices and provide as appropriate  - Encourage maximum independence but intervene and supervise when necessary  - Involve family in performance of ADLs  - Assess for home care needs following discharge   - Consider OT consult to assist with ADL evaluation and planning for discharge  - Provide patient education as appropriate  Outcome: Progressing  Goal: Maintain or return mobility status to optimal level  Description  INTERVENTIONS:  - Assess patient's baseline mobility status (ambulation, transfers, stairs, etc )    - Identify cognitive and physical deficits and behaviors that affect mobility  - Identify mobility aids required to assist with transfers and/or ambulation (gait belt, sit-to-stand, lift, walker, cane, etc )  - [place] fall precautions as indicated by assessment  - Record patient progress and toleration of activity level on Mobility SBAR; progress patient to next Phase/Stage  - Instruct patient to call for assistance with activity based on assessment  - Consider rehabilitation consult to assist with strengthening/weightbearing, etc   Outcome: Progressing     Problem: DISCHARGE PLANNING  Goal: Discharge to home or other facility with appropriate resources  Description  INTERVENTIONS:  - Identify barriers to discharge w/patient and caregiver  - Arrange for needed discharge resources and transportation as appropriate  - Identify discharge learning needs (meds, wound care, etc )  - Arrange for interpretive services to assist at discharge as needed  - Refer to Case Management Department for coordinating discharge planning if the patient needs post-hospital services based on physician/advanced practitioner order or complex needs related to functional status, cognitive ability, or social support system  Outcome: Progressing     Problem: Knowledge Deficit  Goal: Patient/family/caregiver demonstrates understanding of disease process, treatment plan, medications, and discharge instructions  Description  Complete learning assessment and assess knowledge base    Interventions:  - Provide teaching at level of understanding  - Provide teaching via preferred learning methods  Outcome: Progressing     Problem: CARDIOVASCULAR - ADULT  Goal: Maintains optimal cardiac output and hemodynamic stability  Description  INTERVENTIONS:  - Monitor I/O, vital signs and rhythm  - Monitor for S/S and trends of decreased cardiac output  - Administer and titrate ordered vasoactive medications to optimize hemodynamic stability  - Assess quality of pulses, skin color and temperature  - Assess for signs of decreased coronary artery perfusion  - Instruct patient to report change in severity of symptoms  Outcome: Progressing  Goal: Absence of cardiac dysrhythmias or at baseline rhythm  Description  INTERVENTIONS:  - Continuous cardiac monitoring, vital signs, obtain 12 lead EKG if ordered  - Administer antiarrhythmic and heart rate control medications as ordered  - Monitor electrolytes and administer replacement therapy as ordered  Outcome: Progressing     Problem: RESPIRATORY - ADULT  Goal: Achieves optimal ventilation and oxygenation  Description  INTERVENTIONS:  - Assess for changes in respiratory status  - Assess for changes in mentation and behavior  - Position to facilitate oxygenation and minimize respiratory effort  - Oxygen administered by appropriate delivery if ordered  - Initiate smoking cessation education as indicated  - Encourage broncho-pulmonary hygiene including cough, deep breathe, Incentive Spirometry  - Assess the need for suctioning and aspirate as needed  - Assess and instruct to report SOB or any respiratory difficulty  - Respiratory Therapy support as indicated  Outcome: Progressing     Problem: Prexisting or High Potential for Compromised Skin Integrity  Goal: Skin integrity is maintained or improved  Description  INTERVENTIONS:  - Identify patients at risk for skin breakdown  - Assess and monitor skin integrity  - Assess and monitor nutrition and hydration status  - Monitor labs   - Assess for incontinence   - Turn and reposition patient  - Assist with mobility/ambulation  - Relieve pressure over bony prominences  - Avoid friction and shearing  - Provide appropriate hygiene as needed including keeping skin clean and dry  - Evaluate need for skin moisturizer/barrier cream  - Collaborate with interdisciplinary team   - Patient/family teaching  - Consider wound care consult   Outcome: Progressing     Problem: Nutrition/Hydration-ADULT  Goal: Nutrient/Hydration intake appropriate for improving, restoring or maintaining nutritional needs  Description  Monitor and assess patient's nutrition/hydration status for malnutrition  Collaborate with interdisciplinary team and initiate plan and interventions as ordered  Monitor patient's weight and dietary intake as ordered or per policy  Utilize nutrition screening tool and intervene as necessary  Determine patient's food preferences and provide high-protein, high-caloric foods as appropriate       INTERVENTIONS:  - Monitor oral intake, urinary output, labs, and treatment plans  - Assess nutrition and hydration status and recommend course of action  - Evaluate amount of meals eaten  - Assist patient with eating if necessary   - Allow adequate time for meals  - Recommend/ encourage appropriate diets, oral nutritional supplements, and vitamin/mineral supplements  - Order, calculate, and assess calorie counts as needed  - Recommend, monitor, and adjust tube feedings and TPN/PPN based on assessed needs  - Assess need for intravenous fluids  - Provide specific nutrition/hydration education as appropriate  - Include patient/family/caregiver in decisions related to nutrition  Outcome: Progressing     Problem: METABOLIC, FLUID AND ELECTROLYTES - ADULT  Goal: Glucose maintained within target range  Description  INTERVENTIONS:  - Monitor Blood Glucose as ordered  - Assess for signs and symptoms of hyperglycemia and hypoglycemia  - Administer ordered medications to maintain glucose within target range  - Assess nutritional intake and initiate nutrition service referral as needed  Outcome: Progressing     Problem: SKIN/TISSUE INTEGRITY - ADULT  Goal: Skin integrity remains intact  Description  INTERVENTIONS  - Identify patients at risk for skin breakdown  - Assess and monitor skin integrity  - Assess and monitor nutrition and hydration status  - Monitor labs (i e  albumin)  - Assess for incontinence   - Turn and reposition patient  - Assist with mobility/ambulation  - Relieve pressure over bony prominences  - Avoid friction and shearing  - Provide appropriate hygiene as needed including keeping skin clean and dry  - Evaluate need for skin moisturizer/barrier cream  - Collaborate with interdisciplinary team (i e  Nutrition, Rehabilitation, etc )   - Patient/family teaching  Outcome: Progressing  Goal: Incision(s), wounds(s) or drain site(s) healing without S/S of infection  Description  INTERVENTIONS  - Assess and document risk factors for skin impairment   - Assess and document dressing, incision, wound bed, drain sites and surrounding tissue  - Consider nutrition services referral as needed  - Oral mucous membranes remain intact  - Provide patient/ family education  Outcome: Progressing  Goal: Oral mucous membranes remain intact  Description  INTERVENTIONS  - Assess oral mucosa and hygiene practices  - Implement preventative oral hygiene regimen  - Implement oral medicated treatments as ordered  - Initiate Nutrition services referral as needed  Outcome: Progressing

## 2020-01-27 NOTE — ASSESSMENT & PLAN NOTE
Acute hypoxemic respiratory failure in the setting of acute on chronic diastolic congestive heart failure  Off Bumex and transitioned to Bumex 80 mg BID     CXR improved

## 2020-01-27 NOTE — ASSESSMENT & PLAN NOTE
· ELZBIETA on CKD 3 secondary to ATN/postobstructive uropathy/CHF  · Continues to improve  Nephrology following    · Diuretics switched to oral     Results from last 7 days   Lab Units 01/27/20  0537 01/26/20  0855 01/25/20  0618 01/24/20  1349 01/23/20  0626 01/22/20  0505 01/21/20  0542   BUN mg/dL 140* 143* 131* 131* 128* 122* 118*   CREATININE mg/dL 2 24* 2 45* 2 44* 2 59* 2 58* 2 75* 2 87*

## 2020-01-27 NOTE — ASSESSMENT & PLAN NOTE
· MSSA sepsis/bacteremia secondary to left foot wound  ELBERT negative  · IV antibiotics with cefazolin per ID until 2/6/2020

## 2020-01-27 NOTE — PROGRESS NOTES
Progress Note - Willard Brian 1955, 75 y o  male MRN: [id number]    Unit/Bed#: E4 -01 Encounter: [id number]    Primary Care Provider: Anup Allen DO   Date and time admitted to hospital: 1/7/2020 11:29 AM        * Acute respiratory failure with hypoxia ([facility] [address] )  Assessment & Plan  · Acute respiratory failure with hypoxia secondary to decompensated CHF  · Was on umex drip and discontinued yesterday with transition to oral furosemide 80 mg b i d  Jacque Arbour · Wean oxygen if able    Acute renal failure superimposed on stage 3 chronic kidney disease ([facility] [address] )  Assessment & Plan  · ELZBIETA on CKD 3 secondary to ATN/postobstructive uropathy/CHF  · Continues to improve  Nephrology following  · Diuretics switched to oral     Results from last 7 days   Lab Units 01/27/20  0537 01/26/20  0855 01/25/20  0618 01/24/20  1349 01/23/20  0626 01/22/20  0505 01/21/20  0542   BUN mg/dL 140* 143* 131* 131* 128* 122* 118*   CREATININE mg/dL 2 24* 2 45* 2 44* 2 59* 2 58* 2 75* 2 87*       Type 2 diabetes mellitus with diabetic neuropathy, without long-term current use of insulin [facility])  Assessment & Plan  Lab Results   Component Value Date    HGBA1C 6 4 (H) 01/07/2020     Recent Labs     01/26/20  1059 01/26/20  1557 01/26/20  2058 01/27/20  0722   POCGLU 211* 199* 225* 91     · Diabetes mellitus continue current dose of glargine and sliding scale  Staphylococcus aureus bacteremia  Assessment & Plan  · MSSA bacteremia; IV antibiotics as above    Essential hypertension  Assessment & Plan  · Essential hypertension continue amlodipine metoprolol  · Holding losartan given kidney injury    Acute diastolic (congestive) heart failure [facility])  Assessment & Plan  Wt Readings from Last 3 Encounters:   01/27/20 95 7 kg (210 lb 15 7 oz)   12/13/19 96 2 kg (212 lb)   12/03/19 97 5 kg (215 lb)     · Acute on chronic diastolic CHF    · Transitioned to furosemide 80 mg b i d  yesterday (home dose 40 mg b i d )    Urinary retention  Assessment & Plan  · Had urinary retention and passed voiding trial   · Continue tamsulosin  PAF (paroxysmal atrial fibrillation) ([facility])  Assessment & Plan  · Paroxysmal atrial fibrillation  Continue metoprolol and eliquis    Severe sepsis ([address] )  Assessment & Plan  · MSSA sepsis/bacteremia secondary to left foot wound  ELBERT negative  · IV antibiotics with cefazolin per ID until 2/6/2020  VTE Pharmacologic Prophylaxis: Apixaban (Eliquis)    Patient Centered Rounds: I have performed bedside rounds with nursing staff today  Discussions with Specialists or Other Care Team Provider:   Education and Discussions with Family / Patient:     Time Spent for Care: 25 mins  More than 50% of total time spent on counseling and coordination of care as described above  Current Length of Stay: 20 day(s)  Current Patient Status: Inpatient     Certification Statement: The patient will continue to require additional inpatient hospital stay due to Acute respiratory failure with hypoxia [facility])  Discharge Plan / Estimated Discharge Date:  Patient planning for home with IV antibiotics  Code Status: Level 1 - Full Code  ______________________________________________________________________________    Subjective:   Patient seen and examined  No new complaints  Feeling better  Still requiring oxygen  Objective:   Vitals: Blood pressure 169/70, pulse 61, temperature 97 6 °F (36 4 °C), temperature source Temporal, resp  rate 19, weight 95 7 kg (210 lb 15 7 oz), SpO2 98 %      Physical Exam:   General appearance: alert, appears stated age and cooperative  Head: Normocephalic, without obvious abnormality, atraumatic  Lungs: diminished breath sounds  Heart: regular rate and rhythm  Abdomen: soft, non-tender, positive bowel sounds   Back: negative  Extremities: left foot wrapped  Neurologic: Grossly normal    Additional Data:   Labs:  Results from last 7 days   Lab Units 01/25/20  0618 01/24/20  1349 01/23/20  0626 01/22/20  5963 01/21/20  0542   WBC Thousand/uL 5 43 10 48* 11 59* 10 18* 12 20*   HEMOGLOBIN g/dL 15 0 8 6* 8 1* 7 5* 7 1*   HEMATOCRIT % 46 0 26 4* 24 8* 22 9* 21 5*   MCV fL 89 91 91 90 92   PLATELETS Thousands/uL 248 387 399* 379 394*     Results from last 7 days   Lab Units 01/27/20  0537 01/26/20  0855 01/25/20  0618 01/24/20  1349 01/23/20  0626 01/22/20  0505 01/21/20  0542   SODIUM mmol/L 137 134* 135* 132* 134* 131* 133*   POTASSIUM mmol/L 3 4* 3 6 3 5 4 2 3 3* 3 3* 3 4*   CHLORIDE mmol/L 99* 96* 97* 95* 97* 98* 98*   CO2 mmol/L 30 29 28 27 26 23 22   ANION GAP mmol/L 8 9 10 10 11 10 13   BUN mg/dL 140* 143* 131* 131* 128* 122* 118*   CREATININE mg/dL 2 24* 2 45* 2 44* 2 59* 2 58* 2 75* 2 87*   CALCIUM mg/dL 9 0 8 9 9 0 8 8 8 8 8 4 8 3   EGFR ml/min/1 73sq m 29 26 27 25 25 23 22   GLUCOSE RANDOM mg/dL 72 194* 111 240* 79 87 116     Results from last 7 days   Lab Units 01/25/20  0618 01/22/20  0505   MAGNESIUM mg/dL 1 7 2 3                  Results from last 7 days   Lab Units 01/27/20  0722 01/26/20 2058 01/26/20  1557 01/26/20  1059 01/26/20  0707 01/25/20  2059 01/25/20  1518 01/25/20  1102 01/25/20  0732 01/24/20  2117 01/24/20  1552 01/24/20  1117   POC GLUCOSE mg/dl 91 225* 199* 211* 95 197* 263* 272* 122 227* 234* 281*             * I Have Reviewed All Lab Data Listed Above  Cultures:               Imaging:  Imaging Reports Reviewed Today Include:   Xr Chest 2 Views    Result Date: 1/7/2020  Impression: Mildly increased interstitial markings and a small left pleural effusion may indicate recurrent CHF/pulmonary edema  The study was marked in [facility] for immediate notification  Workstation performed: [id number]     Xr Foot Left 3+ Views    Result Date: 1/13/2020  Impression: 1  Interval resection of the tibial sesamoid with expected postoperative appearance  2   Additional postoperative changes as above appear stable   Workstation performed: [id number]     Xr Foot Left 3+ Views    Result Date: 1/9/2020  Impression: Status post amputation 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 Workstation performed: [id number]     Xr Foot 3+ Vw Left    Result Date: 1/8/2020  Impression: Suspicion of a small focus of erosion involving the distal plantar portion of the 1st proximal phalanx concerning for osteomyelitis Workstation performed: [id number]     Ct Chest Wo Contrast    Result Date: 1/7/2020  Impression: Bilateral pleural effusions and diffuse intralobular septal thickening in keeping with CHF  The study was marked in [facility] for immediate notification  Workstation performed: [id number]     Scheduled Meds:  Current Facility-Administered Medications:  acetaminophen 650 mg Oral Q6H PRN Melissa Ambron, DO    allopurinol 300 mg Oral QAM Wyn Pillion, DPM    amLODIPine 10 mg Oral Daily MOE Michael    apixaban 5 mg Oral BID Melissa Ambron, DO    cefazolin 1,000 mg Intravenous [facility] Last Rate: Stopped (01/27/20 0310)   docusate sodium 100 mg Oral BID Melissa Ambron, DO    ferrous gluconate 324 mg Oral BID AC Melissa Ambron, DO    furosemide 80 mg Oral BID (diuretic) Jovon Zee MD    guaiFENesin 600 mg Oral BID Wyn Pillion, DPM    insulin glargine 22 Units Subcutaneous HS Melissa Ambron, DO    insulin lispro 2-12 Units Subcutaneous TID AC Wyn Pillion, DPM    ipratropium-albuterol 3 mL Nebulization Q6H PRN Wyn Pillion, DPM    LORazepam 0 5 mg Oral Q8H PRN Melissa Ambron, DO    melatonin 6 mg Oral HS Wyn Pillion, DPM    metoprolol tartrate 50 mg Oral Q12H [address] Wyn Pillion, DPM    oxyCODONE 5 mg Oral Q4H PRN Wyn Pillion, DPM    potassium chloride 20 mEq Oral BID Rujul Hinds, DO    pravastatin 40 mg Oral Daily With Veto Lease, DPM    tamsulosin 0 4 mg Oral Daily With Veto Lease, DPM        [place] Clipper, DO  [facility] Internal Medicine  Hospitalist    ** Please Note: This note has been constructed using a voice recognition system   **

## 2020-01-27 NOTE — ASSESSMENT & PLAN NOTE
Lab Results   Component Value Date    HGBA1C 6 4 (H) 01/07/2020       Recent Labs     01/25/20 2059 01/26/20  0707 01/26/20  1059 01/26/20  1557   POCGLU 197* 95 211* 199*       Blood Sugar Average: Last 72 hrs:  (P) 085 [id number]     SSI and basal bolus insulin

## 2020-01-27 NOTE — PROGRESS NOTES
Progress Note - Cardiology   Huber Patel 75 y o  male MRN: [id number]  Unit/Bed#: E4 -01 Encounter: [id number]          Asessment/Plan:  1  MSSA bacteremia secondary to left hallux cellulitis/osteomyelitis (present on admission):    --TTE and ELBERT without signs of endocarditis   --s/p Lt hallux amp - 1/8/20   --S/p I&D with removal of sesamoid bones  2  Chronic and acute diastolic CHF:  Improved with current standing weight 95 7 kg below prior baseline (had been 102 kg at admission)   3  Valvular heart disease:  Per ELBERT 1/15/2020-mild MR, moderate MS, mild AS, mild AI and mild TR  4  Paroxysmal atrial fibrillation:  Current exam implies maintenance of normal sinus rhythm, Eliquis anticoagulation ongoing  5  Hx third-degree AV block - in situ permanent pacemaker  6  Hypertension:  Controlled  7  CKD 3, ELZBIETA present on admission:  Creatinine had peaked to 2 98 by continues to improve -currently 2 2 (prior baseline looks of been approximately 1 5)  8  Hyponatremia:  Resolved with current value 137  9  Hypokalemia:  Remains below normal at 3 4  10  Diabetes mellitus    · Ongoing antibiotics with therapy planned through 2/6 as delineated in notes of Infectious Disease  · Oral diuretic resumed yesterday ~~> continue Lasix 80 mg b i d  · Will escalate dose of K-Dur  · For hypertension continue Lopressor, Norvasc, and diuretic  · Continue statin      Subjective:  No complaint to me  With walker he has been transferring to the chair and bathroom denying any accompanying chest pain or dyspnea    No orthopnea or perceived cardiac ectopy      Vitals:  Vitals:    01/26/20 0600 01/27/20 0600   Weight: 95 5 kg (210 lb 8 6 oz) 95 7 kg (210 lb 15 7 oz)   ,  Vitals:    01/26/20 1500 01/26/20 2100 01/27/20 0600 01/27/20 0700   BP: 160/70 155/63  169/70   BP Location: Left arm Right arm  Left arm   Pulse: 63 74  61   Resp: 18 18  19   Temp: 98 6 °F (37 °C) 98 8 °F (37 1 °C)  97 6 °F (36 4 °C)   TempSrc: Temporal Tympanic Temporal   SpO2: 97% 91%  98%   Weight:   95 7 kg (210 lb 15 7 oz)        Exam:  General:  Alert normally conversant and comfortable-appearing  Heart:  Regular with controlled rate and no pathologic murmur or rub  Lungs:  Clear throughout  Lower Limbs:  Left foot bandaged-clean dry and intact, at least 1-2 +edema bilaterally               Medications:    Current Facility-Administered Medications:     acetaminophen (TYLENOL) tablet 650 mg, 650 mg, Oral, Q6H PRN, Melissa Alonso DO, 650 mg at 01/26/20 2116    allopurinol (ZYLOPRIM) tablet 300 mg, 300 mg, Oral, QAM, Marquise Hamilton, DPM, 300 mg at 01/27/20 0808    amLODIPine (NORVASC) tablet 10 mg, 10 mg, Oral, Daily, [facility], [facility], 10 mg at 01/27/20 0808    apixaban (ELIQUIS) tablet 5 mg, 5 mg, Oral, BID, Melissa Alonso DO, 5 mg at 01/27/20 0808    ceFAZolin (ANCEF) IVPB (premix) 1,000 mg, 1,000 mg, Intravenous, Q8H, PAT Pena, Stopped at 01/27/20 0310    docusate sodium (COLACE) capsule 100 mg, 100 mg, Oral, BID, Melissa Alonso DO, 100 mg at 01/25/20 0843    ferrous gluconate (FERGON) tablet 324 mg, 324 mg, Oral, BID AC, Melissa Alonso DO, 324 mg at 01/27/20 0600    furosemide (LASIX) tablet 80 mg, 80 mg, Oral, BID (diuretic), Celestina Flynn MD, 80 mg at 01/27/20 0808    guaiFENesin (MUCINEX) 12 hr tablet 600 mg, 600 mg, Oral, BID, Marquise aHmilton, DPM, 600 mg at 01/27/20 0808    insulin glargine (LANTUS) subcutaneous injection 22 Units 0 22 mL, 22 Units, Subcutaneous, HS, Melissa Alonso DO, 22 Units at 01/26/20 2110    insulin lispro (HumaLOG) 100 units/mL subcutaneous injection 2-12 Units, 2-12 Units, Subcutaneous, TID AC, 2 Units at 01/26/20 1729 **AND** Fingerstick Glucose (POCT), , , TID AC, Marquise Hamilton, LIZBETH    ipratropium-albuterol (DUO-NEB) 0 5-2 5 mg/3 mL inhalation solution 3 mL, 3 mL, Nebulization, Q6H PRN, Marquise Hamilton DPM, 3 mL at 01/19/20 1243    LORazepam (ATIVAN) tablet 0 5 mg, 0 5 mg, Oral, Q8H PRN, Melissa Alonso, , 0 5 mg at 01/26/20 2124    melatonin tablet 6 mg, 6 mg, Oral, HS, Jaleesa Caroline, DPM, 6 mg at 01/26/20 2109    metoprolol tartrate (LOPRESSOR) tablet 50 mg, 50 mg, Oral, Q12H [address], Jaleesa Caroline, DPM, 50 mg at 01/27/20 0808    oxyCODONE (ROXICODONE) IR tablet 5 mg, 5 mg, Oral, Q4H PRN, Jaleesa Caroline, DPM, 5 mg at 01/12/20 1709    potassium chloride (K-DUR,KLOR-CON) CR tablet 20 mEq, 20 mEq, Oral, BID, Rujul Hinds, DO, 20 mEq at 01/27/20 0808    pravastatin (PRAVACHOL) tablet 40 mg, 40 mg, Oral, Daily With Aragon Brittle, DPM, 40 mg at 01/26/20 1727    tamsulosin (FLOMAX) capsule 0 4 mg, 0 4 mg, Oral, Daily With Moises Rosatle, DPM, 0 4 mg at 01/26/20 1726      Labs/Data:        Results from last 7 days   Lab Units 01/25/20  0618 01/24/20  1349 01/23/20  0626   WBC Thousand/uL 5 43 10 48* 11 59*   HEMOGLOBIN g/dL 15 0 8 6* 8 1*   HEMATOCRIT % 46 0 26 4* 24 8*   PLATELETS Thousands/uL 248 387 399*     Results from last 7 days   Lab Units 01/27/20  0537 01/26/20  0855 01/25/20  0618   POTASSIUM mmol/L 3 4* 3 6 3 5   CHLORIDE mmol/L 99* 96* 97*   CO2 mmol/L 30 29 28   BUN mg/dL 140* 143* 131*

## 2020-01-27 NOTE — ASSESSMENT & PLAN NOTE
Wt Readings from Last 3 Encounters:   01/27/20 95 7 kg (210 lb 15 7 oz)   12/13/19 96 2 kg (212 lb)   12/03/19 97 5 kg (215 lb)     · Acute on chronic diastolic CHF    · Transitioned to furosemide 80 mg b i d  yesterday (home dose 40 mg b i d )

## 2020-01-27 NOTE — SOCIAL WORK
Rec a message to call spouse Jd Martinez who got a new cell [phone] and this was changed on face sheet  Jd Martinez stated the w/c, BSC and knee scooter will be delivered today  They also put in a w/c ramp  Spouse is asking for IV Abx be changed to 0600- 1400 - 2200  Sent text to MD asking if times can be changed  Spouse reports pt received all the equipment today  Spouse will call [facility] to give co-pay and find out when med will be delivered  Updated [facility] with times [phone]- [phone] and possible discharge tomorrow  TC to RN and asked her to instruct spouse today with care for PICC and IV abx  Rec a message from [facility] that they can schedule RN for 1400 on Wed 1/29/20  MD, RN, [facility], spouse and [facility] aware looking for discharge on Wed 1/29/20  Spouse is asking for IV Abx to be deliver tomorrow to the home  A message was sent to [facility] to be deliver to home tomorrow

## 2020-01-27 NOTE — PHYSICAL THERAPY NOTE
PHYSICAL THERAPY NOTE          Patient Name: Edgardo Arce  [id number] Date: 1/27/2020   Time In: 1809 Time Out: 7538       01/27/20 1820   Pain Assessment   Pain Assessment 0-10   Pain Score 5   Pain Type Acute pain   Pain Location Hip   Pain Orientation Right   Effect of Pain on Daily Activities pt declining progression of mobility 2* to R hip pain   Hospital Pain Intervention(s) Repositioned; Ambulation/increased activity; Elevated;Rest;Emotional support   Response to Interventions tolerated   Restrictions/Precautions   Weight Bearing Precautions Per Order Yes   LLE Weight Bearing Per Order NWB   Braces or Orthoses Other (Comment)  (sx shoe LLE)   Other Precautions O2;Fall Risk;Pain   General   Chart Reviewed Yes   Additional Pertinent History S/p I&D with removal of sesamoid on 01/12/20 and s/p left hallux amputation on 01/08/20   Response to Previous Treatment Patient with no complaints from previous session  Family/Caregiver Present No   Cognition   Overall Cognitive Status WFL   Arousal/Participation Alert; Cooperative   Attention Attends with cues to redirect   Following Commands Follows one step commands without difficulty   Comments occ cues to maintain NWB LLE during functional tasks  pt with poor judgement and insight into deficits  Subjective   Subjective "I can tell you right now my R hip hurts so I'm not hopping far "   Transfers   Sit to Stand 5  Supervision   Additional items Armrests; Increased time required;Verbal cues   Stand to Sit 5  Supervision   Additional items Impulsive;Verbal cues;Armrests   Stand pivot 5  Supervision   Additional items Increased time required;Verbal cues; Other  (RW)   Additional Comments cues for safety and to maintain NWB LLE  pt impulsive to sit post amb w cues for positioning and safety as pt attempting to sit prior to fully reaching chair- difficulty maintain NWB LLE during stand>sit most likely due to poor position in regards to chair/impuslivity   Ambulation/Elevation   Gait pattern Decreased foot clearance; Forward Flexion; Short stride  (hop to)   Gait Assistance 5  Supervision   Additional items Assist x 1;Verbal cues   Assistive Device Rolling walker   Distance 10' fwd hop to   Stair Management Assistance Not tested   Balance   Static Standing Fair  (RW)   Dynamic Standing Fair -  (RW)   Ambulatory Fair -  (RW)   Endurance Deficit   Endurance Deficit Yes   Endurance Deficit Description pain, fatigue, cognition (anxiety)   Activity Tolerance   Activity Tolerance Patient limited by fatigue;Patient limited by pain; Other (Comment)  (anxiety)   Nurse Made Aware Miles Handler RN   Exercises   Knee AROM Long Arc Quad Sitting;10 reps;AROM; Bilateral   Marching Sitting;10 reps;AROM; Bilateral   Assessment   Prognosis Fair   Problem List Decreased strength;Decreased range of motion;Decreased endurance; Impaired balance;Decreased mobility; Decreased coordination;Decreased cognition; Impaired judgement;Decreased skin integrity;Orthopedic restrictions;Decreased safety awareness   Assessment Gearold Anna was seen for a follow up session today; pt agreeable to PT at this time however reporting pain in R hip and therefore declining significant progression of mobility  Pt currently functioning at supervision level for bed mobility (pt report), transf and amb hop to pattern w RW  Pt require occ cues to maintain NWB LLE and for safety as pt continues to present w anxiety impacting judgement, safety awareness and insight into deficits  Pt reports he had ramp installed to access home so does not have to worry about steps at time of d/c  Current recommendation is STR vs home w 24/7 supervision and HHPT pending progress as pt has been fluctuating in level of A required to perform mobility tasks over length of hospital stay  If d/c home recommend knee scooter and first floor set up      Barriers to Discharge None   Barriers to Discharge Comments ramped entrance, supportive wife   Goals   Patient Goals no new goals stated   STG Expiration Date 02/17/20   Short Term Goal #1 1) Perform all transfers Annalise demonstrating safe and appropriate technique 100% of the time in order to improve ability to negotiate safely in home environment  2) Amb with LRAD >25' with mod I in order to demonstrate ability to negotiate in home environment  3)  Improve overall functional strength and balance 1/2 grade in order to optimize ability to perform functional tasks and reduce fall risk  4) Increase activity tolerance to 45 minutes in order to improve endurance to functional tasks  5) PT for ongoing patient and family/caregiver education, DME needs and d/c planning in order to promote highest level of function in least restrictive environment  6) maintain NWB LLE during functional tasks 3/3  PT Treatment Day 6   Plan   Treatment/Interventions Functional transfer training;LE strengthening/ROM; Therapeutic exercise; Endurance training;Patient/family training;Equipment eval/education; Bed mobility;Gait training; Compensatory technique education;Continued evaluation;Spoke to nursing   Progress Slow progress, decreased activity tolerance  (anxiety)   PT Frequency Other (Comment)  (3-5/wk)   Recommendation   Recommendation Short-term skilled PT; Home with family support;24 hour supervision/assist;Home PT   Equipment Recommended Other (Comment)  (knee scooter)   PT - OK to Discharge Yes   Additional Comments yes to STR, yes to home w knee scooter and first floor set up     Florestine Severin, PT

## 2020-01-27 NOTE — ASSESSMENT & PLAN NOTE
Patient with MSSA Bacteremia from left hallux wound  He remains on Ancef, he has received a PICC line  He did have a ELBERT that was negative for vegetation, he is status post left hallux amputation on 01/08/20/2020 as well as incision and drainage removal of the sesamoid bone on 01/12/2020  This appears to be the nidus of infection  Appreciate Podiatry recommendations  He will continue Ancef through to 01/20/2020  During this time he will need weekly CBCs in creatinine while on intravenous antibiotic    POD 16 today for left hallux amputation       ID following - anticipate dc 1/28/2020 with home infusion services

## 2020-01-27 NOTE — PLAN OF CARE
Problem: PHYSICAL THERAPY ADULT  Goal: Performs mobility at highest level of function for planned discharge setting  See evaluation for individualized goals  Description  Treatment/Interventions: Functional transfer training, Therapeutic exercise, Elevations, Patient/family training, Equipment eval/education, Bed mobility, Gait training  Equipment Recommended: [place], Wheelchair       See flowsheet documentation for full assessment, interventions and recommendations  Outcome: Progressing  Note:   Prognosis: Fair  Problem List: Decreased strength, Decreased range of motion, Decreased endurance, Impaired balance, Decreased mobility, Decreased coordination, Decreased cognition, Impaired judgement, Decreased skin integrity, Orthopedic restrictions, Decreased safety awareness  Assessment: Terry Dang was seen for a follow up session today; pt agreeable to PT at this time however reporting pain in R hip and therefore declining significant progression of mobility  Pt currently functioning at supervision level for bed mobility (pt report), transf and amb hop to pattern w RW  Pt require occ cues to maintain NWB LLE and for safety as pt continues to present w anxiety impacting judgement, safety awareness and insight into deficits  Pt reports he had ramp installed to access home so does not have to worry about steps at time of d/c  Current recommendation is STR vs home w 24/7 supervision and HHPT pending progress as pt has been fluctuating in level of A required to perform mobility tasks over length of hospital stay  If d/c home recommend knee scooter and first floor set up  Barriers to Discharge: None  Barriers to Discharge Comments: ramped entrance, supportive wife  Recommendation: Short-term skilled PT, Home with family support, 24 hour supervision/assist, Home PT     PT - OK to Discharge: Yes    See flowsheet documentation for full assessment

## 2020-01-27 NOTE — ASSESSMENT & PLAN NOTE
Lab Results   Component Value Date    HGBA1C 6 4 (H) 01/07/2020       Recent Labs     01/25/20 2059 01/26/20  0707 01/26/20  1059 01/26/20  1557   POCGLU 197* 95 211* 199*       Blood Sugar Average: Last 72 hrs:  (P) 859 [id number] iddm on insulin degludec-liraglutide 18 iu daily  Will transition to lantus at 9 iu qhs and start ssi/poc bs given poor appetite

## 2020-01-27 NOTE — PROGRESS NOTES
Progress Note - Infectious Disease   Ngo Pelt 75 y o  male MRN: [id number]  Unit/Bed#: E4 -01 Encounter: [id number]      Impression/Plan:  1  Sepsis   POA   Fever and leukocytosis   Likely secondary to MSSA bacteremia with left hallux wound and underlying osteopmyelitis as presumed source  No other clear source appreciated  Despite being systemically ill the patient has been hemodynamically stable  Repeat blood cultures were negative after 5 days  TTE and ELBERT were negative for valvlular vegetation and pacemaker lead abnormalities  -antibiotic as below  -monitor CBC and BMP  -monitor vitals  -supportive care     2  [employer]s in both sets of patient's initial blood cultures  Suspect patient's left great toe ulcer was source   Patient does have an implanted pacemaker  TTE and ELBERT were negative  Repeat blood cultures were clear after 5 days  Patient is currently receiving IV cefazolin and is tolerating without difficulty  He will complete 4 weeks of IV antibiotic treatment, through 2/6/2020   -continue IV cefazolin, dosed for renal function, through 2/6/2020 for 4 weeks of antibiotic treatment  -remove PICC after patient's final dose of IV antibiotics on 2/6/2020  -weekly CBCD and BMP while on IV antibiotics  -patient will require surveillance blood cultures two weeks after completion of antibiotic treatment  -antibiotic and lab scripts dropped off in patient chart on 1/15/2020      3  Left hallux ulceration   With osteomyelitis as his wound does probe to bone and an x-ray of the left foot was suggestive of bony erosion at the 1st proximal phalanx   Purulence noted during initial podiatry wound exam, concern for possible infectious tenosynovitis   I suspect this was the source of patient's bacteremia above  Claire Ana is now status post amputation of hallux for presumed surgical cure of the ulcer and osteomyelitis  Intraop wound cultures showed MSSA  He had additional I&D and removal and sesamoid bone on 1/12/2020    -serial left foot exams  -follow up intraop wound cultures  -local wound care per Podiatry  -continue close follow-up with Podiatry     4  Acute kidney injury  On CKD  Per patient's medical records it appears his baseline creatinine has been approximately 1  Consider secondary to volume overload  Also consider urinary retention  He temporarily required a Jones catheter but is now voiding without difficulty  He is following closely with nephrology   His Bumex drip was stopped last night   -monitor BMP  -dose adjust antibiotics for renal function as needed  -continue close follow up with nephrology     5  Acute hypoxic respiratory failure   Suspect this is all secondary to pulmonary edema and diastolic heart failure   BNP and troponin were elevated upon admission  Initial chest x-ray and CT of the chest did not show consolidations indicative of a bacterial process  New chest imaging shows persistent CHF  Patient did require BiPAP support at night and was placed on a Bumex drip  His respiratory status has significantly improved and his oxygen saturation is now stable on nasal cannula O2  Bumex drip was turned off last night   -diuresis per Cardiology/nephrology  -monitor vitals  -monitor respiratory status  -continue close follow-up with cardiology  -continue close follow-up with nephrology     6  Type 2 diabetes mellitus with neuropathy   Patient's last hemoglobin A1c was 6 4% on 01/07/2020   This is risk factor for wounds and infection   Recommend tight glycemic control for overall health, especially in the setting of wound infection   -blood glucose management per primary service     7  Chronic diastolic heart failure  In setting of aortic and mitral valve stenosis and complete heart block   He has a Medtronic dual chamber pacemaker in place   Now with Staph aureus bacteremia   TTE and ELBERT were negative  He is following closely with Cardiology   -continue follow-up with Cardiology     8  Paroxysmal AFib   On Eliquis      Patient remained stable for discharge from ID standpoint  Above plan was discussed in detail with patient and his wife at the bedside  Antibiotics:  Cefazolin 18  Antibiotics 20  Postop 19    Subjective:  Patient reports he is feeling much better this morning  States over the weekend his breathing began to feel more normal and this morning he is not feeling short of breath at all  He tells me he has an occasional cough and brings out some thin white phlegm but does not feel that the phlegm is sticky and does not feel congested  He denies fevers, chills, sweats, shakes; no nausea, vomiting, abdominal pain, diarrhea, or dysuria; no pain in his right foot, no concerns with his dressing  No new symptoms  Objective:  Vitals:  Temp:  [97 6 °F (36 4 °C)-98 8 °F (37 1 °C)] 97 6 °F (36 4 °C)  HR:  [61-74] 61  Resp:  [18-19] 19  BP: (155-169)/(63-70) 169/70  SpO2:  [91 %-98 %] 98 %  Temp (24hrs), Av 3 °F (36 8 °C), Min:97 6 °F (36 4 °C), Max:98 8 °F (37 1 °C)  Current: Temperature: 97 6 °F (36 4 °C)    Physical Exam:   General Appearance:  Alert, interactive, nontoxic, no acute distress  Patient is wearing his glasses  Throat: Oropharynx moist without lesions  Lungs:   Clear to auscultation bilaterally; no wheezes, rhonchi or rales; respirations unlabored; patient on nasal cannula O2   Heart:  RRR; +murmur, no rub or gallop   Abdomen:   Soft, non-tender, non-distended, positive bowel sounds  Extremities: No clubbing or cyanosis; mild improved bilateral lower extremity edema L>R   Skin: No new rashes, lesions, or draining wounds noted on exposed skin    Left foot dressing is clean/dry/intact with no spreading erythema from bandage site     Labs, Imaging, & Other studies:   All pertinent labs and imaging studies were personally reviewed  Results from last 7 days   Lab Units 20  0618 20  1349 20  0626   WBC Thousand/uL 5 43 10 48* 11 59* HEMOGLOBIN g/dL 15 0 8 6* 8 1*   PLATELETS Thousands/uL 248 387 399*     Results from last 7 days   Lab Units 01/27/20  0537   POTASSIUM mmol/L 3 4*   CHLORIDE mmol/L 99*   CO2 mmol/L 30   BUN mg/dL 140*   CREATININE mg/dL 2 24*   EGFR ml/min/1 73sq m 29   CALCIUM mg/dL 9 0

## 2020-01-27 NOTE — ASSESSMENT & PLAN NOTE
Lab Results   Component Value Date    HGBA1C 6 4 (H) 01/07/2020     Recent Labs     01/26/20  1059 01/26/20  1557 01/26/20 2058 01/27/20  0722   POCGLU 211* 199* 225* 91     · Diabetes mellitus continue current dose of glargine and sliding scale

## 2020-01-27 NOTE — ASSESSMENT & PLAN NOTE
Wt Readings from Last 3 Encounters:   01/26/20 95 5 kg (210 lb 8 6 oz)   12/13/19 96 2 kg (212 lb)   12/03/19 97 5 kg (215 lb)     Now with acute exacerbation - On Bumex drip  Repeat labs in the am    ELBERT w/LVEF 60% but moderate to severe mitral stenosis  Appreciate cardiology recommendations

## 2020-01-27 NOTE — ASSESSMENT & PLAN NOTE
· Acute respiratory failure with hypoxia secondary to decompensated CHF  · Was on umex drip and discontinued yesterday with transition to oral furosemide 80 mg b i d  Carol Console   · Wean oxygen if able

## 2020-01-27 NOTE — DISCHARGE INSTRUCTIONS
REMOVE PICC AFTER FINAL DOSE OF IV ANTIBIOTICS ON 2/6/2020  Weekly CBCD and BMP while on IV antibiotics   ----------------------------------------------------------------------------------------------------------------------------------------------------    Discharge Instructions - Podiatry    Weight Bearing Status:  Nonweightbearing left lower extremity                       Pain: Continue analgesics as directed    Follow-up appointment instructions: Please make an appointment within one week of discharge with Dr  [address] [facility] or Dr Anival Gutiérrez  Contact sooner if any increase in pain, or signs of infection occur    Wound Care: Leave dressings clean, dry, and intact until 1st outpatient office visit  Acute Kidney Injury (ELZBIETA)  You have been diagnosed with Acute Kidney Injury (ELZBIETA)  The following information has been developed to provide you with information about ELZBIETA and treatment  What is Acute Kidney Injury (ELZBIETA)? ELZBIETA occurs when kidney function decreases over a short period of time  This condition causes a buildup of waste products in the blood and can cause fluid to build up causing swelling in the legs and shortness of breath  Sometimes called Acute Kidney Failure or Acute Renal Failure, ELZBIETA is often reversible if it is found and treated quickly  How do you know if you have ELZBIETA? ELZBIETA is diagnosed by assessing kidney function  This is done by obtaining a blood test to measure the blood level of creatinine  Decreased urine output can sometimes also indicate ELZBIETA  Who is at risk for ELZBIETA?   ELZBIETA can happen to anyone but usually happens to people who are already sick and may be in the hospital  People are at higher risk for ELZBIETA if they have any of the following:   age 72 years or older   high or low blood pressure   underlying kidney disease (e g , Chronic Kidney Disease (CKD)   peripheral vascular disease (hardening of arteries)   chronic diseases such as liver disease, heart disease and diabetes   a single kidney    What are the symptoms of ELZBIETA? You may or may not have the symptoms to suggest you have kidney injury until the ELZBIETA has progressed  Some of the symptoms are listed below:   Not making enough urine   Increased swelling in legs    Feeling tired   Trouble breathing or shortness of breath   Nausea   New or worsening confusion    What causes ELZBIETA? The causes are divided into three categories:   Not enough blood flowing to the kidneys (e g , low blood pressure, bleeding, diarrhea, dehydration)   Injury directly to the kidneys (e g , blood clots, severe infections such as sepsis, medicine toxicity, IV contrast dye used for cardiac catheterization or CT scans)   Blockage to the tubes (ureters) that drain the urine from the kidneys (e g , enlarged prostate, kidney stones, blood clots)    What is the treatment for ELZBIETA? The treatment for ELZBIETA depends on correcting what caused it  Treatment usually involves removing the cause and measures to prevent further injury to the kidneys  This may require the use of intravenous fluids or medications and/or temporary dialysis  Dialysis is a process using a machine that does the job of the kidneys to remove waste and help correct the electrolyte and fluid balance while the kidneys are recovering  If dialysis is needed to treat ELZBIETA, the doctor will assess daily to see if the kidneys are showing signs of recovery  The daily assessments determine how long dialysis needs to continue  Depending on the cause and the extent of damage, an episode of ELZBIETA may resolve in a few days to several weeks to several months  What are the long term effects of having an episode of ELZBIETA?  People who have one episode of ELZBIETA are at an increased risk of having another episode of ELZBIETA as well as other health problems such as kidney disease, stroke, and heart disease     In a small number of people who had unrecognized kidney disease, an ELZBIETA episode may result in Chronic Kidney Disease (CKD) which requires lifelong monitoring and treatment  How do you prevent future episodes of ELZBIETA?  Make sure to follow up with the kidney doctor after hospital discharge and obtain blood work to reassess and monitor kidney function   If you have diabetes, keep your blood sugar in goal range and keep appointments with your diabetes specialist    Jacquelin Simeon If you have high blood pressure, have your blood pressure checked regularly to make sure it is in target range  o If you take blood pressure medicine called ACEIs or ARBs (e g , Lisinopril, Enalapril, Diovan, Losartan), your doctor may tell you to skip a dose or two if you have severe dehydration and your blood pressure is running low   Avoid using medicines such as NSAIDs (Nonsteroidal Anti-inflammatory Drugs) and Garcia-2 Inhibitors (a type of NSAID) that may be harmful to kidney function  These may include the medicines listed in the table that follows  Examples of NSAIDS and Garcia-2 Inhibitors   Talk to your doctor or healthcare provider before stopping any medicine ordered for you  Celecoxib (CELEBREX) Ketoprofen (ORUDIS, ORUVAIL)   Diclofenac (VOLTAREN, CATAFLAM) Ketorolac (TORADOL)   Diflunisal (DOLOBID) Meloxicam (MOBIC)   Etodolac (LODINE) Nabumetone (RELAFEN)   Fenoprofen (NALFON) Naproxen (ALEVE, NAPROSYN,    NAPRELAN, ANAPROX)   Flurbiprofen (ANSAID) Oxaprozin (DAYPRO)   Ibuprofen (MOTRIN, ADVIL) Piroxicam (FELDENE)   Indomethacin (INDOCIN) Sulindac (CLINORIL)    Tolmetin (Quinn Pat)     Is there a special diet for people with ELZBIETA? People with ELZBIETA and/or other kidney disease often have high potassium and phosphorus levels in their blood  To protect the kidneys from further injury and to avoid complications, most doctors recommend following a healthy diet choosing foods low potassium and low phosphorus   Limiting dietary potassium to 2 5 grams/day and phosphorus to 800 milligrams/day is recommended      A dietitian can help you with learning more about this type of diet   The tables on the following page may help you to choose lower potassium and phosphorus foods  The following websites are also good sources of information:  Tu at  org/nutrition/Kidney-Disease-Stages-1-4   Jeyson[employer]nan   https://www niddk nih gov/health-information/kidney-disease/chronic-kidney-disease-ckd/eating-nutrition  https://[facility]/health/articles/15641-renal-diet-basics  [employer] cy    If you have any questions or concerns about your condition, please contact your doctor or healthcare provider  These tables may help you to choose lower potassium and phosphorus foods    AVOID these higher phosphorus* foods: CHOOSE these lower phosphorus* foods:   Milk, pudding , yogurt made from animals and from many soy varieties Rice milk (unfortified), nondairy creamer   Hard cheeses, ricotta, cottage cheese, fat-free cream cheese Regular and low-fat cream cheese   Ice cream, frozen yogurt Sherbet, frozen fruit pops, sorbet   Soups made with milk, dried peas, beans, lentils or other high phosphorus ingredients Soups made with broth, are water-based, or other lower phosphorus ingredients   Whole grains, including whole-grain breads, crackers, cereal, rice and pasta, corn tortillas Refined grains, including white bread, crackers, cereals, rice and pasta   Quick breads, biscuits, cornbread, muffins, pancakes, waffles, granola, wheat germ Homemade refined (white) dinner rolls, bagels, English muffins, sugar cookies, shortbread cookies, kelly food cake   Dried peas (split, black-eyed), beans (black, garbanzo, lima, kidney, navy, nino), lentils Green peas (canned, frozen), green beans, wax beans   Organ meats, walleye, Warren, sardines Lean beef, pork, lamb, poultry, other fish   Nuts and seeds Popcorn   Peanut butter, other nut butters; tofu, veggie or soy burgers Jam, jelly, honey   Chocolate, including chocolate drinks Carob (chocolate-flavored) candy, hard candy,  gumdrops   Arvin, pepper-type sodas, flavored luna, bottled teas, beer Lemon-lime soda, ginger ale or root beer, plain water, cream soda, grape soda   *Always read labels and avoid foods with ingredients containing "phos"  AVOID these higher potassium foods: CHOOSE these lower potassium foods:      Milk (fat free, low fat, whole, buttermilk, Soy), yogurt    Regular and low-fat cream cheese      Beans (white, Lima), Acushnet sprouts, spinach Swiss       chard, broccoli, avocado, artichoke, potatoes, sweet      potatoes, tomatoes/tomato sauce, beet greens      Green beans, alfalfa sprouts, bamboo shoots (canned),       cabbage, carrots, cauliflower, corn, cucumber, eggplant,      endive, lettuce, mushrooms, onions, radishes,  watercress,       water chestnuts (canned), rice, peas      Halibut, tuna, cod, snapper, tuna fish, turkey    Egg, lean beef, pork, lamb, shellfish, chicken       Banana, papaya, orange, cantaloupe, dates, raisins and      other dried fruit, pomegranate, avocado      Apple, applesauce, blackberries, raspberries, pears,     watermelon, cucumbers, blueberries, cranberries,       peaches      Almonds, peanuts, hazelnuts, [place], cashew, mixed,      seeds (sunflower, pumpkin)     Homemade refined (white) dinner rolls, bagels,      English muffins, flour tortilla, crackers, irving      crackers, popcorn, pretzels, spaghetti or macaroni,       hummus      Tomato or vegetable juice, prune juice    Papaya, ev, or pear nectar, cranberry juice cocktail      Molasses

## 2020-01-27 NOTE — PHYSICIAN ADVISOR
Current patient class: Inpatient  The patient is currently on Hospital Day: 21 at [facility]      The patient was admitted to the hospital at  on 1/7/20 for the following diagnosis:  Chills [R68 83]  Leucocytosis [D72 829]  Sore throat [J02 9]  Fatigue [R53 83]  Elevated troponin [R79 89]  ELZBIETA (acute kidney injury) ([facility] [address] ) [N17 9]  Elevated brain natriuretic peptide (BNP) level [R79 89]  Elevated troponin I level [Y12 94]  Chronic diastolic (congestive) heart failure ([facility] [address] ) [I50 32]       There is documentation in the medical record of an expected length of stay of at least 2 midnights  The patient is therefore expected to satisfy the 2 midnight benchmark and given the 2 midnight presumption is appropriate for INPATIENT ADMISSION  Given this expectation of a satisfying stay, CMS instructs us that the patient is most often appropriate for inpatient admission under part A provided medical necessity is documented in the chart  After review of the relevant documentation, labs, vital signs and test results, the patient is appropriate for INPATIENT ADMISSION  Admission to the hospital as an inpatient is a complex decision making process which requires the practitioner to consider the patients presenting complaint, history and physical examination and all relevant testing  With this in mind, in this case, the patient was deemed appropriate for INPATIENT ADMISSION  After review of the documentation and testing available at the time of the admission I concur with this clinical determination of medical necessity  Rationale is as follows: The patient is a 75 yrs old Male who presented to the ED at 1/7/2020 11:29 AM with a chief complaint of Fatigue (Pt with multiple complaints: sore throat, diarrhea, fatigue, fevers, tylenol at 0930  -ill contacts  )  Patient came with acute bronchitis and acute on chronic CHF exacerbation  The patient received IV Lasix as well as azithromycin  Patient was also noted to be uremic and the patient also was found to have acute renal insufficiency  Patient was thought to have severe sepsis as well and was put on IV Ancef and the patient did come back positive for MSSA bacteremia  Patient had a hallux amputation on 1/8/2020 as well as incision and drainage and removal of the sesamoid bone on 1/12/2020  Up until yesterday the patient was still receiving IV Lasix secondary to acute diastolic congestive heart failure  Given all of the above and the patient's need for IV antibiotics in a bacteremic patient with MSSA say bacteremia as well as acute congestive heart failure needing monitoring of fluid status the patient is still inpatient status appropriate      The patients vitals on arrival were ED Triage Vitals   Temperature Pulse Respirations Blood Pressure SpO2   01/07/20 1118 01/07/20 1118 01/07/20 1118 01/07/20 1118 01/07/20 1118   99 1 °F (37 3 °C) 76 18 122/59 91 %      Temp Source Heart Rate Source Patient Position - Orthostatic VS BP Location FiO2 (%)   01/07/20 1118 01/07/20 1118 01/07/20 1312 01/07/20 1118 01/07/20 2138   Temporal Monitor Lying Right arm 50      Pain Score       01/07/20 1118       No Pain           Past Medical History:   Diagnosis Date    Arthritis     OA    Bruise of both arms     forearms and both hands    Bruises easily     [facility]) [facility]     Diabetes mellitus ([facility] [address] )     Diabetic foot ulcer ([facility])     Eczema     Erectile dysfunction     Gout     Hyperlipidemia     Hypertension     Murmur     Nephropathy     Osteoarthritis     PAD (peripheral artery disease) ([facility])     Seasonal allergies     Toe infection     bilat great toes    Vertigo     Walks frequently     Wears dentures     upper    Wears glasses      Past Surgical History:   Procedure Laterality Date    CARDIAC PACEMAKER PLACEMENT      CARPAL TUNNEL RELEASE Left     CARPAL TUNNEL RELEASE Right     CARPAL TUNNEL RELEASE      INCISION AND DRAINAGE OF WOUND Left 1/12/2020    Procedure: INCISION AND DRAINAGE (I&D) EXTREMITY AND REMOVAL OF SESMOID BONE;  Surgeon: Adelina Ellison DPM;  [facility];  Service: Podiatry    IR PICC REPO  1/14/2020    KNEE ARTHROSCOPY Left     KNEE ARTHROSCOPY Right     KNEE SURGERY      ME AMPUTATION TOE,I-P JT Bilateral 5/2/2017    Procedure: PARTIAL AMPUTATION RIGHT AND LEFT HALLUX ;  Surgeon: Divina Lopes DPM;  [facility];  Service: Podiatry    ME AMPUTATION TOE,MT-P JT Left 7/25/2017    Procedure: 2ND TOE AMPUTATION;  Surgeon: Divina Lopes DPM;  [facility];  Service: Podiatry    SHOULDER ARTHROSCOPY Left     with screws,RTC    SHOULDER SURGERY      TOE AMPUTATION Left 1/8/2020    Procedure: HALLUX AMPUTATION;  Surgeon: Adelina Ellison DPM;  [facility];  Service: Podiatry    VASECTOMY             Consults have been placed to:   IP CONSULT TO CASE MANAGEMENT  IP CONSULT TO CARDIOLOGY  IP CONSULT TO PODIATRY  IP CONSULT TO INFECTIOUS DISEASES  IP CONSULT TO NUTRITION SERVICES  IP CONSULT TO PICC TEAM  IP CONSULT TO NEPHROLOGY    Vitals:    01/26/20 2100 01/27/20 0600 01/27/20 0700 01/27/20 1500   BP: 155/63  169/70 154/70   BP Location: Right arm  Left arm Left arm   Pulse: 74  61 62   Resp: 18  19 18   Temp: 98 8 °F (37 1 °C)  97 6 °F (36 4 °C) 97 6 °F (36 4 °C)   TempSrc: Tympanic  Temporal Tympanic   SpO2: 91%  98% 100%   Weight:  95 7 kg (210 lb 15 7 oz)         Most recent labs:    Recent Labs     01/25/20  0618  01/27/20  0537   WBC 5 43  --   --    HGB 15 0  --   --    HCT 46 0  --   --      --   --    K 3 5   < > 3 4*   CALCIUM 9 0   < > 9 0   *   < > 140*   CREATININE 2 44*   < > 2 24*    < > = values in this interval not displayed         Scheduled Meds:  Current Facility-Administered Medications:  acetaminophen 650 mg Oral Q6H PRN Melissa Ambron, DO    allopurinol 300 mg Oral QAM Equilla Double, DPM    amLODIPine 10 mg Oral Daily Alec PA-ANNIE    apixaban 5 mg Oral BID Katy Romero, DO    cefazolin 1,000 mg Intravenous Q8H Jorge Wells DO Last Rate: 1,000 mg (01/27/20 1309)   docusate sodium 100 mg Oral BID Melissa Ambron, DO    ferrous gluconate 324 mg Oral BID AC Melissa Ambron, DO    furosemide 80 mg Oral BID (diuretic) Monica Dutton MD    guaiFENesin 600 mg Oral BID Zoran Ambrosio, DPM    insulin glargine 22 Units Subcutaneous HS Melissa Ambron, DO    insulin lispro 2-12 Units Subcutaneous TID AC Zoran Ambrosio, DPM    ipratropium-albuterol 3 mL Nebulization Q6H PRN Zoran Ambrosio, DPM    LORazepam 0 5 mg Oral Q8H PRN Melissa Ambron, DO    melatonin 6 mg Oral HS Zoran Ambrosio, DPM    metoprolol tartrate 50 mg Oral Q12H [facility] Zoran Ambrosio, DPM    oxyCODONE 5 mg Oral Q4H PRN Zoran Valente, DPM    potassium chloride 20 mEq Oral BID Bean Hinds, DO    pravastatin 40 mg Oral Daily With Tamara Campos Cha, DPM    tamsulosin 0 4 mg Oral Daily With Tamara Campos Cha DPFELTON      Continuous Infusions:   PRN Meds:   acetaminophen    ipratropium-albuterol    LORazepam    oxyCODONE    Surgical procedures (if appropriate):  Procedure(s):  INCISION AND DRAINAGE (I&D) EXTREMITY AND REMOVAL OF SESMOID BONE

## 2020-01-28 ENCOUNTER — APPOINTMENT (INPATIENT)
Dept: NON INVASIVE DIAGNOSTICS | Facility: HOSPITAL | Age: 65
DRG: 853 | End: 2020-01-28
Payer: MEDICARE

## 2020-01-28 DIAGNOSIS — R78.81 BACTEREMIA DUE TO GRAM-POSITIVE BACTERIA: Primary | ICD-10-CM

## 2020-01-28 LAB
ANION GAP SERPL CALCULATED.3IONS-SCNC: 10 MMOL/L (ref 4–13)
BUN SERPL-MCNC: 132 MG/DL (ref 5–25)
CALCIUM SERPL-MCNC: 9.1 MG/DL (ref 8.3–10.1)
CHLORIDE SERPL-SCNC: 99 MMOL/L (ref 100–108)
CO2 SERPL-SCNC: 28 MMOL/L (ref 21–32)
CREAT SERPL-MCNC: 2.14 MG/DL (ref 0.6–1.3)
ERYTHROCYTE [DISTWIDTH] IN BLOOD BY AUTOMATED COUNT: 14 % (ref 11.6–15.1)
GFR SERPL CREATININE-BSD FRML MDRD: 31 ML/MIN/1.73SQ M
GLUCOSE SERPL-MCNC: 104 MG/DL (ref 65–140)
GLUCOSE SERPL-MCNC: 164 MG/DL (ref 65–140)
GLUCOSE SERPL-MCNC: 202 MG/DL (ref 65–140)
GLUCOSE SERPL-MCNC: 255 MG/DL (ref 65–140)
GLUCOSE SERPL-MCNC: 81 MG/DL (ref 65–140)
HCT VFR BLD AUTO: 24.5 % (ref 36.5–49.3)
HGB BLD-MCNC: 8 G/DL (ref 12–17)
MCH RBC QN AUTO: 29.3 PG (ref 26.8–34.3)
MCHC RBC AUTO-ENTMCNC: 32.7 G/DL (ref 31.4–37.4)
MCV RBC AUTO: 90 FL (ref 82–98)
PLATELET # BLD AUTO: 343 THOUSANDS/UL (ref 149–390)
PMV BLD AUTO: 9.6 FL (ref 8.9–12.7)
POTASSIUM SERPL-SCNC: 3.8 MMOL/L (ref 3.5–5.3)
RBC # BLD AUTO: 2.73 MILLION/UL (ref 3.88–5.62)
SODIUM SERPL-SCNC: 137 MMOL/L (ref 136–145)
WBC # BLD AUTO: 9.57 THOUSAND/UL (ref 4.31–10.16)

## 2020-01-28 PROCEDURE — 94660 CPAP INITIATION&MGMT: CPT

## 2020-01-28 PROCEDURE — 97535 SELF CARE MNGMENT TRAINING: CPT

## 2020-01-28 PROCEDURE — 94760 N-INVAS EAR/PLS OXIMETRY 1: CPT

## 2020-01-28 PROCEDURE — 99232 SBSQ HOSP IP/OBS MODERATE 35: CPT | Performed by: INTERNAL MEDICINE

## 2020-01-28 PROCEDURE — 99233 SBSQ HOSP IP/OBS HIGH 50: CPT | Performed by: INTERNAL MEDICINE

## 2020-01-28 PROCEDURE — 80048 BASIC METABOLIC PNL TOTAL CA: CPT | Performed by: INTERNAL MEDICINE

## 2020-01-28 PROCEDURE — 99231 SBSQ HOSP IP/OBS SF/LOW 25: CPT | Performed by: INTERNAL MEDICINE

## 2020-01-28 PROCEDURE — 97116 GAIT TRAINING THERAPY: CPT

## 2020-01-28 PROCEDURE — 85027 COMPLETE CBC AUTOMATED: CPT | Performed by: INTERNAL MEDICINE

## 2020-01-28 PROCEDURE — 97530 THERAPEUTIC ACTIVITIES: CPT

## 2020-01-28 PROCEDURE — 93970 EXTREMITY STUDY: CPT

## 2020-01-28 PROCEDURE — 82948 REAGENT STRIP/BLOOD GLUCOSE: CPT

## 2020-01-28 RX ADMIN — METOPROLOL TARTRATE 50 MG: 50 TABLET, FILM COATED ORAL at 21:18

## 2020-01-28 RX ADMIN — ACETAMINOPHEN 650 MG: 325 TABLET ORAL at 14:21

## 2020-01-28 RX ADMIN — INSULIN GLARGINE 22 UNITS: 100 INJECTION, SOLUTION SUBCUTANEOUS at 21:18

## 2020-01-28 RX ADMIN — PRAVASTATIN SODIUM 40 MG: 40 TABLET ORAL at 17:01

## 2020-01-28 RX ADMIN — GUAIFENESIN 600 MG: 600 TABLET ORAL at 21:23

## 2020-01-28 RX ADMIN — MELATONIN TAB 3 MG 6 MG: 3 TAB at 21:18

## 2020-01-28 RX ADMIN — POTASSIUM CHLORIDE 20 MEQ: 1500 TABLET, EXTENDED RELEASE ORAL at 08:05

## 2020-01-28 RX ADMIN — TAMSULOSIN HYDROCHLORIDE 0.4 MG: 0.4 CAPSULE ORAL at 17:01

## 2020-01-28 RX ADMIN — APIXABAN 5 MG: 5 TABLET, FILM COATED ORAL at 17:01

## 2020-01-28 RX ADMIN — GUAIFENESIN 600 MG: 600 TABLET ORAL at 08:04

## 2020-01-28 RX ADMIN — FUROSEMIDE 80 MG: 80 TABLET ORAL at 08:03

## 2020-01-28 RX ADMIN — INSULIN LISPRO 2 UNITS: 100 INJECTION, SOLUTION INTRAVENOUS; SUBCUTANEOUS at 17:03

## 2020-01-28 RX ADMIN — CEFAZOLIN SODIUM 1000 MG: 1 SOLUTION INTRAVENOUS at 21:18

## 2020-01-28 RX ADMIN — POTASSIUM CHLORIDE 20 MEQ: 1500 TABLET, EXTENDED RELEASE ORAL at 17:01

## 2020-01-28 RX ADMIN — ALLOPURINOL 300 MG: 100 TABLET ORAL at 08:04

## 2020-01-28 RX ADMIN — CEFAZOLIN SODIUM 1000 MG: 1 SOLUTION INTRAVENOUS at 14:21

## 2020-01-28 RX ADMIN — FUROSEMIDE 80 MG: 80 TABLET ORAL at 17:01

## 2020-01-28 RX ADMIN — INSULIN LISPRO 4 UNITS: 100 INJECTION, SOLUTION INTRAVENOUS; SUBCUTANEOUS at 12:04

## 2020-01-28 RX ADMIN — AMLODIPINE BESYLATE 10 MG: 10 TABLET ORAL at 08:04

## 2020-01-28 RX ADMIN — CEFAZOLIN SODIUM 1000 MG: 1 SOLUTION INTRAVENOUS at 05:55

## 2020-01-28 RX ADMIN — LORAZEPAM 0.5 MG: 1 TABLET ORAL at 14:21

## 2020-01-28 RX ADMIN — FERROUS GLUCONATE 324 MG: 324 TABLET ORAL at 17:01

## 2020-01-28 RX ADMIN — APIXABAN 5 MG: 5 TABLET, FILM COATED ORAL at 08:04

## 2020-01-28 RX ADMIN — METOPROLOL TARTRATE 50 MG: 50 TABLET, FILM COATED ORAL at 08:04

## 2020-01-28 NOTE — OCCUPATIONAL THERAPY NOTE
Occupational Therapy Treatment Note:         01/28/20 1429   Restrictions/Precautions   Weight Bearing Precautions Per Order Yes   LLE Weight Bearing Per Order NWB   Braces or Orthoses   (surgical shoe to affected LLE> )   Other Precautions O2;Fall Risk;Pain   Pain Assessment   Pain Assessment 0-10   Pain Score 3   Pain Type Acute pain   Pain Location Hip   Pain Orientation Right   ADL   Where Assessed Chair   Grooming Assistance 5  Supervision/Setup   Grooming Deficit Setup   LB Bathing Assistance 4  Minimal Assistance   LB Bathing Deficit Steadying;Setup;Verbal cueing; Increased time to complete;Supervision/safety; Buttocks; Perineal area   LB Bathing Comments Pt using hip shift tech to complete isaias care  LB Dressing Assistance 4  Minimal Assistance   LB Dressing Deficit Steadying;Setup; Requires assistive device for steadying;Verbal cueing;Supervision/safety; Increased time to complete; Don/doff L sock; Don/doff R sock; Thread RLE into underwear; Thread LLE into underwear;Pull up over hips   LB Dressing Comments cues for safety required with application of standard shoe to [facility]  5  Supervision/Setup   Toileting Deficit Setup;Steadying;Verbal cueing;Supervison/safety; Increased time to complete;Clothing management up;Clothing management down;Perineal hygiene; Bedside commode   Functional Standing Tolerance   Time 2 mins   Activity dynamic stand balance activity  Comments Pt with increased anxiety noted with activity  Bed Mobility   Supine to Sit Unable to assess   Transfers   Sit to Stand 5  Supervision   Additional items Assist x 1; Armrests; Increased time required;Verbal cues   Stand to Sit 5  Supervision   Additional items Impulsive; Increased time required;Verbal cues   Stand pivot 5  Supervision   Additional items Increased time required;Verbal cues;Armrests   Toilet transfer 4  Minimal assistance   Additional items Assist x 1; Increased time required;Verbal cues; Commode   Additional Comments Pt with increased fatigue and hip pain with activity  Functional Mobility   Functional Mobility 4  Minimal assistance   Additional Comments x1   Additional items Rolling walker   Cognition   Overall Cognitive Status WFL   Arousal/Participation Responsive   Attention Attends with cues to redirect   Orientation Level Oriented X4   Memory Decreased short term memory;Decreased recall of recent events;Decreased recall of precautions   Following Commands Follows one step commands without difficulty   Comments Pt with need for cues to focus to tasks, keeping eyes closed through out tx session warranting need for redirection  Additional Activities   Additional Activities Other (Comment)  (reviewed current plan of care  )   Additional Activities Comments Pt reports having F understanding  Activity Tolerance   Activity Tolerance Patient limited by fatigue   Medical Staff Made Aware Reported all findings to nursing staff  Assessment   Assessment Pt was seen for skilled OT with focus on completion of self care tasks, self toileting, review of fall prevention, RW safety and review of current plan of care  Reviewed at length Pt's current plan of care with return to home environment  Reviewed techs to maintain NWB status in LLE with self care routine as well as household transfers  Wife was present during tx session  SBA with functional transfers with support of RW  Pt was able to maintain NWB in RLE with activities instance for more than 2 mins  See above levels of A required for all functional tasks  Pt will benefit from continued home therapies to ease transition upon discharge as well as continued family support  Pt's wife reports Pt having commode, RW and shower chair at home for usage  Plan   Treatment Interventions ADL retraining;Functional transfer training;UE strengthening/ROM; Endurance training;Cognitive reorientation   Goal Expiration Date 01/31/20   OT Treatment Day 6   OT Frequency 3-5x/wk Recommendation   OT Discharge Recommendation Short Term Rehab   Equipment Recommended Bedside commode   OT - OK to Discharge Yes  (when medically cleared)   Barthel Index   Feeding 10   Bathing 0   Grooming Score 5   Dressing Score 5   Bladder Score 0   Bowels Score 10   Toilet Use Score 5   Transfers (Bed/Chair) Score 10   Mobility (Level Surface) Score 0   Stairs Score 0   Barthel Index Score 45   [place], [address]

## 2020-01-28 NOTE — NURSING NOTE
Patient unable to complete walking home O2 evaluation d/t weight bearing precautions on the left foot       Patient oxygen saturation 89% on room air at rest

## 2020-01-28 NOTE — PROGRESS NOTES
Follow up Consultation    Nephrology   Edgardo Arce 75 y o  male MRN: [id number]  Unit/Bed#: E4 -01 Encounter: [id number]      Physician Requesting Consult: Monroe Mar  Reason for Consult:  Acute kidney injury       ASSESSMENT/PLAN:    1)Acute kidney injury (POA) on CKD stage 3:  - ELZBIETA most likely secondary to cardiorenal syndrome with component of obstructive uropathy likely leading to ATN  - After review of records it appears that the patient has a baseline Creatinine of 1-1 5 mg/dL  - patient's creatinine today is at 2 14 mg/dL stable and improved from yesterday  - Avoid nephrotoxins, adjust meds to appropriate GFR   - Acid base and lytes stable   - clinically patient appears to be hypervolemic  - diuretics per Cardiology  - was on Bumex drip and now on p o  Lasix as of 01/26/2020 of 80 mg p o  B i d   - status post metolazone x1 on 01/25/2020  - Clinically patient is not uremic and there is no acute indication for renal replacement therapy (dialysis)  - Optimize hemodynamic status to avoid delay in renal recovery  - check BMP, magnesium, phosphorus in a m   - Await renal recovery  - Place on a renal diet when allowed diet order    - Strict I/O  - patient will need follow-up with Nephrology as an outpatient upon discharge with labs in 1 week, message sent to the office   - patient follows up with [facility] for nephrology as an outpatient    2)Blood pressure/hypertension:  - Optimize hemodynamics   - Maintain MAP > 65mmHg  - Avoid BP fluctuations  - on Norvasc 10 mg p o   Q day and metoprolol 50 mg p o  B i d  Lasix 80 mg p o  B i d     3)H/H/anemia:  - most recent hemoglobin at 8 grams/deciliter  - maintain hemoglobin greater than 8 grams/deciliter  - on ferrous sulfate    4)Volume status:  - Clinically patient appears to be minimally hypervolemic  - on Lasix 80 mg p o  B i d   - diuretics per Cardiology    5)Hyponatremia:  - Likely due to decreased free water clearance  - Likely due to decreased distal sodium delivery  - Continue to monitor for now, most recent sodium 137 mEq  - continue fluid restriction    6)CHF:  - Management as per primary team  - most recent EF of 55%  - follow-up with cardiology  - continue daily weights and intake and now take    7) hypokalemia:  - check BMP, potassium improved to 3 8  - on potassium supplements    8) BPH/obstruction:  - on Flomax  - Jones has been removed    Thanks for the consult  Will continue to follow  Please call with questions/ concerns  Above-mentioned orders and Plan in terms of acute kidney injury was discussed with the team in depth  Stable from renal standpoint for discharge when medically cleared  Jhonny Liz MD, FASN, 2020, 1:04 PM              Objective :   Patient seen and examined in his room no overnight events hemodynamically stable remains afebrile urine output close to 2 6 L in last 24 hours reports he is likely getting discharged tomorrow  Edema is improved      PHYSICAL EXAM  /70 (BP Location: Left arm)   Pulse 62   Temp 98 °F (36 7 °C) (Tympanic)   Resp 18   Wt 93 8 kg (206 lb 12 7 oz)   SpO2 96%   BMI 29 94 kg/m²   Temp (24hrs), Av 9 °F (36 6 °C), Min:97 6 °F (36 4 °C), Max:98 1 °F (36 7 °C)        Intake/Output Summary (Last 24 hours) at 2020 1304  Last data filed at 2020 0801  Gross per 24 hour   Intake 240 ml   Output 2100 ml   Net -1860 ml       I/O last 24 hours: In: 860 [P O :860]  Out: 2600 [Urine:2600]      Current Weight: Weight - Scale: 93 8 kg (206 lb 12 7 oz)  First Weight: Weight - Scale: 97 2 kg (214 lb 4 6 oz)  Physical Exam   Constitutional: He is oriented to person, place, and time  He appears well-developed and well-nourished  No distress  HENT:   Head: Normocephalic and atraumatic  Mouth/Throat: Oropharynx is clear and moist  No oropharyngeal exudate  Eyes: Conjunctivae are normal  No scleral icterus  Neck: Neck supple  No tracheal deviation present  No thyromegaly present  Cardiovascular: Normal heart sounds  Exam reveals no friction rub  Pulmonary/Chest: Effort normal  No respiratory distress  He has no wheezes  He has no rales  Abdominal: Soft  He exhibits no mass  There is no tenderness  Musculoskeletal: He exhibits edema  Trace edema bilateral lower extremities   Neurological: He is alert and oriented to person, place, and time  Skin: Skin is warm  He is not diaphoretic  No pallor  Psychiatric: He has a normal mood and affect  His behavior is normal    Nursing note and vitals reviewed  Review of Systems   Constitutional: Negative for chills and fatigue  HENT: Negative for congestion  Respiratory: Negative for cough, shortness of breath and wheezing  Cardiovascular: Positive for leg swelling  Negative for chest pain  Gastrointestinal: Negative for abdominal pain, constipation, diarrhea, nausea and vomiting  Genitourinary: Negative for difficulty urinating, dysuria and flank pain  Musculoskeletal: Negative for back pain  Neurological: Negative for dizziness and headaches  Psychiatric/Behavioral: Negative for agitation and confusion  All other systems reviewed and are negative        Scheduled Meds:    Current Facility-Administered Medications:  acetaminophen 650 mg Oral Q6H PRN Melissa Ambron, DO    allopurinol 300 mg Oral QAM Alex Holland DPM    amLODIPine 10 mg Oral Daily Su Bernard PA-C    apixaban 5 mg Oral BID Melissa Ambron, DO    cefazolin 1,000 mg Intravenous Q8H Jorge Wells DO Last Rate: 1,000 mg (01/28/20 0555)   docusate sodium 100 mg Oral BID Melissa Ambron, DO    ferrous gluconate 324 mg Oral BID AC Melissa Ambron, DO    furosemide 80 mg Oral BID (diuretic) Kassandra Grover MD    guaiFENesin 600 mg Oral BID Alex Holland DPM    insulin glargine 22 Units Subcutaneous HS Melissa Ambron, DO    insulin lispro 2-12 Units Subcutaneous TID AC Alex Holland DPM    ipratropium-albuterol 3 mL Nebulization Q6H PRN Alex Holland DPM LORazepam 0 5 mg Oral Q8H PRN Melissa Ambron, DO    melatonin 6 mg Oral HS Larissa Stokes, DPM    metoprolol tartrate 50 mg Oral Q12H [facility] Larissa Stokes, DPM    oxyCODONE 5 mg Oral Q4H PRN Larissa Stokes, DPM    potassium chloride 20 mEq Oral BID Rujul Hinds, DO    pravastatin 40 mg Oral Daily With Lavclayton Eliud Parks, DPM    tamsulosin 0 4 mg Oral Daily With Lis Payton, DPM        PRN Meds:   acetaminophen    ipratropium-albuterol    LORazepam    oxyCODONE    Continuous Infusions:       Invasive Devices: Invasive Devices     Peripherally Inserted Central Catheter Line            PICC Line 75/92/98 Right Basilic 15 days                  LABORATORY:    Results from last 7 days   Lab Units 01/28/20  0554 01/27/20  0537 01/26/20  0855 01/25/20  0618 01/24/20  1349 01/23/20  0626 01/22/20  0505   WBC Thousand/uL 9 57  --   --  5 43 10 48* 11 59* 10 18*   HEMOGLOBIN g/dL 8 0*  --   --  15 0 8 6* 8 1* 7 5*   HEMATOCRIT % 24 5*  --   --  46 0 26 4* 24 8* 22 9*   PLATELETS Thousands/uL 343  --   --  248 387 399* 379   POTASSIUM mmol/L 3 8 3 4* 3 6 3 5 4 2 3 3* 3 3*   CHLORIDE mmol/L 99* 99* 96* 97* 95* 97* 98*   CO2 mmol/L 28 30 29 28 27 26 23   BUN mg/dL 132* 140* 143* 131* 131* 128* 122*   CREATININE mg/dL 2 14* 2 24* 2 45* 2 44* 2 59* 2 58* 2 75*   CALCIUM mg/dL 9 1 9 0 8 9 9 0 8 8 8 8 8 4   MAGNESIUM mg/dL  --   --   --  1 7  --   --  2 3      rest all reviewed    RADIOLOGY:  XR chest pa & lateral   Final Result by Rocky Lynn MD (01/24 1333)      Partial improvement of pulmonary edema and bilateral effusions            Workstation performed: [id number]         VAS upper limb venous duplex scan, unilateral/limited   Final Result by Mitch Waldron DO (01/22 0979)      XR chest portable   Final Result by Anuel Rodriguez MD (01/21 0225)   Stable tubes and lines without pneumothorax  Persistent CHF with asymmetric airspace opacities and left basilar effusion              Workstation performed: [id number]         XR chest portable   Final Result by Karen Minor MD (01/20 1148)      Persistent pulmonary edema with increasing left pleural effusion            Workstation performed: [id number]         VAS lower limb venous duplex study, unilateral/limited   Final Result by Baljinder Boggs MD (01/20 1645)      US kidney and bladder   Final Result by Akira Isabel MD (01/20 0701)         1  Echogenic kidneys consistent with medical renal disease  No hydronephrosis  2   Simple cyst within the midpole of the right kidney  Workstation performed: [id number]         XR chest portable   Final Result by Jonathan Moses MD (01/15 1407)      Left pleural effusion appears somewhat larger than the prior study  No other significant interval change            Workstation performed: [id number]         IR PICC repo   Final Result by Bassam Cortes MD (01/14 1446)      Spontaneous repositioning of a right arm PICC with tip now in the superior vena cava  PICC is okay to use for durable venous access  Workstation performed: [id number]         VAS lower limb arterial duplex, complete bilateral   Final Result by Hollie Staton MD (01/14 1056)      XR chest PICC line portable   Final Result by Ewelina Escobar MD (01/13 1351)      Right PICC line terminating in the right internal jugular vein  Congestive heart failure  Left lower lobe consolidation  Workstation performed: [id number]         XR foot left 3+ views   Final Result by Jasmin Valentin MD (01/13 0801)   1  Interval resection of the tibial sesamoid with expected postoperative appearance  2   Additional postoperative changes as above appear stable        Workstation performed: [id number]         XR foot left 3+ views   Final Result by Ewelina Escobar MD (01/09 1412)      Status post amputation 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            Workstation performed: [id number]         XR foot 3+ vw left   Final Result by Jonathan Moses MD (01/08 1430) Suspicion of a small focus of erosion involving the distal plantar portion of the 1st proximal phalanx concerning for osteomyelitis            Workstation performed: [id number]         CT chest wo contrast   Final Result by Nicholas Myers MD (01/07 1615)      Bilateral pleural effusions and diffuse intralobular septal thickening in keeping with CHF  The study was marked in [facility] for immediate notification  Workstation performed: [id number]         XR chest 2 views   Final Result by Nicholas Myers MD (01/07 1330)      Mildly increased interstitial markings and a small left pleural effusion may indicate recurrent CHF/pulmonary edema  The study was marked in [facility] for immediate notification  Workstation performed: [id number]           Rest all reviewed    Portions of the record may have been created with voice recognition software  Occasional wrong word or "sound a like" substitutions may have occurred due to the inherent limitations of voice recognition software  Read the chart carefully and recognize, using context, where substitutions have occurred  If you have any questions, please contact the dictating provider

## 2020-01-28 NOTE — PROGRESS NOTES
[facility] Progress Note  Patient: Bi Hester 75 y o  male   MRN: [id number]  PCP: Azael Tamayo DO  Unit/Bed#: E4 -01 Encounter: [id number]  Date Of Visit: 01/28/20    Assessment:    Principal Problem:    Acute respiratory failure with hypoxia ([facility] )  Active Problems:    Complete heart block (HCC)    Severe sepsis (HCC)    PAF (paroxysmal atrial fibrillation) ([facility])    Urinary retention    NICOLASA (obstructive sleep apnea)    Acute diastolic (congestive) heart failure ([facility])    Essential hypertension    Acute renal failure superimposed on stage 3 chronic kidney disease ([facility])    Anemia    Elevated troponin I level    Staphylococcus aureus bacteremia    Type 2 diabetes mellitus with diabetic neuropathy, without long-term current use of insulin ([facility])    Iron deficiency anemia    Anxiety      Plan:    Acute respiratory failure with hypoxia ([facility])  Delete Acute respiratory failure with hypoxia secondary to decompensated CHF  Was on Bumex drip and discontinued yesterday with transition to oral furosemide 80 mg b i d     Wean oxygen if able     Acute renal failure superimposed on stage 3 chronic kidney disease ([facility] )  ELZBIETA on CKD 3 secondary to ATN/postobstructive uropathy/CHF  Continues to improve  Nephrology following  Diuretics switched to oral      Results from last 7 days  Lab Units 01/27/20  0537 01/26/20  0855 01/25/20  0618 01/24/20  1349 01/23/20  0626 01/22/20  0505 01/21/20  0542  BUN mg/dL 140* 143* 131* 131* 128* 122* 118*  CREATININE mg/dL 2 24* 2 45* 2 44* 2 59* 2 58* 2 75* 2 87*        Type 2 diabetes mellitus with diabetic neuropathy, without long-term current use of insulin ([facility])  Excellent control with A1c of 6 4   Diabetes mellitus continue current dose of glargine and sliding scale      Staphylococcus aureus bacteremia  MSSA bacteremia; IV antibiotics as above     Essential hypertension  Delete Essential hypertension continue amlodipine metoprolol    Currently normotensive, Holding losartan given kidney injury     Acute diastolic (congestive) heart failure ([facility])  Reports improving symptoms   Acute on chronic diastolic CHF  Transitioned to furosemide 80 mg b i d  (home dose 40 mg b i d )  Tolerating well     Urinary retention  Had urinary retention and passed voiding trial   Continue tamsulosin      PAF (paroxysmal atrial fibrillation) ([facility])  Paroxysmal atrial fibrillation  Continue metoprolol and eliquis     Severe sepsis ([facility])  MSSA sepsis/bacteremia secondary to left foot wound  ELBERT negative  IV antibiotics with cefazolin per ID until 2020           VTE Pharmacologic Prophylaxis:   Pharmacologic:  Eliquis   Mechanical VTE Prophylaxis in Place: Yes    Patient Centered Rounds: I have performed bedside rounds with nursing staff today  Discussions with Specialists or Other Care Team Provider:  S    Education and Discussions with Family / Patient:  Patient    Time Spent for Care: 30 minutes  More than 50% of total time spent on counseling and coordination of care as described above  Current Length of Stay: 21 day(s)    Current Patient Status: Inpatient   Certification Statement: The patient will continue to require additional inpatient hospital stay due to Heart failure exacerbation    Discharge Plan / Estimated Discharge Date: To be determined will need 1-2 more days of inpatient care    Code Status: Level 1 - Full Code      Subjective:   I am doing better still short of breath but better, my foot is swollen to    Objective:     Vitals:   Temp (24hrs), Av 2 °F (36 8 °C), Min:98 °F (36 7 °C), Max:98 4 °F (36 9 °C)    Temp:  [98 °F (36 7 °C)-98 4 °F (36 9 °C)] 98 4 °F (36 9 °C)  HR:  [60-66] 66  Resp:  [18-19] 18  BP: (157-169)/(70-73) 159/71  SpO2:  [96 %-98 %] 98 %  Body mass index is 29 94 kg/m²  Input and Output Summary (last 24 hours):        Intake/Output Summary (Last 24 hours) at 2020 1555  Last data filed at 2020 0801  Gross per 24 hour   Intake 240 ml   Output 2100 ml   Net -1860 ml       Physical Exam:     Physical Exam    Constitutional:  Well developed, well nourished, no acute distress, non-toxic appearance   Eyes:  PERRL, conjunctiva normal   HENT:  Atraumatic, external ears normal, nose normal, oropharynx moist, no pharyngeal exudates  Neck- normal range of motion, no tenderness, supple   Respiratory:  No respiratory distress, normal breath sounds, no rales, no wheezing   Cardiovascular:  Normal rate, normal rhythm, no murmurs, no gallops, no rubs   GI:  Soft, nondistended, normal bowel sounds, nontender, no organomegaly, no mass, no rebound, no guarding   :  No costovertebral angle tenderness   Musculoskeletal:  No edema, no tenderness, no deformities  Back- no tenderness  Integument:  Well hydrated, no rash   Lymphatic:  No lymphadenopathy noted , left lower extremity foot and popliteal area swelling noted will obtain ultrasound  Neurologic:  Alert & oriented x 3, CN 2-12 normal, normal motor function, normal sensory function, no focal deficits noted   Psychiatric:  Speech and behavior appropriate           Additional Data:     Labs:    Results from last 7 days   Lab Units 01/28/20  0554 01/25/20  0618   WBC Thousand/uL 9 57 5 43   HEMOGLOBIN g/dL 8 0* 15 0   HEMATOCRIT % 24 5* 46 0   PLATELETS Thousands/uL 343 248   NEUTROS PCT %  --  63   LYMPHS PCT %  --  15   MONOS PCT %  --  10   EOS PCT %  --  11*     Results from last 7 days   Lab Units 01/28/20  0554   POTASSIUM mmol/L 3 8   CHLORIDE mmol/L 99*   CO2 mmol/L 28   BUN mg/dL 132*   CREATININE mg/dL 2 14*   CALCIUM mg/dL 9 1           * I Have Reviewed All Lab Data Listed Above  * Additional Pertinent Lab Tests Reviewed:  [facility] Admission Reviewed    Imaging:    Imaging Reports Reviewed Today Include:  Yes  Imaging Personally Reviewed by Myself Includes:  Yes    Recent Cultures (last 7 days):           Last 24 Hours Medication List:     Current Facility-Administered Medications:  acetaminophen 650 mg Oral Q6H PRN Melissa Ambron, DO    allopurinol 300 mg Oral QAM Wyn Pillion, DPM    amLODIPine 10 mg Oral Daily Su Bernard, MOE    apixaban 5 mg Oral BID Melissa Ambron, DO    cefazolin 1,000 mg Intravenous Q8H Jorge Wells DO Last Rate: 1,000 mg (01/28/20 1421)   docusate sodium 100 mg Oral BID Melissa Ambron, DO    ferrous gluconate 324 mg Oral BID AC Melissa Ambron, DO    furosemide 80 mg Oral BID (diuretic) Jovon Zee MD    guaiFENesin 600 mg Oral BID Wyn Pillion, DPM    insulin glargine 22 Units Subcutaneous HS Melissa Ambron, DO    insulin lispro 2-12 Units Subcutaneous TID AC Wyn Pillion, DPM    ipratropium-albuterol 3 mL Nebulization Q6H PRN Wyn Pillion, DPM    LORazepam 0 5 mg Oral Q8H PRN Melissa Ambron, DO    melatonin 6 mg Oral HS Wyn Pillion, DPM    metoprolol tartrate 50 mg Oral Q12H [address] Wyn Pillion, DPM    oxyCODONE 5 mg Oral Q4H PRN Wyn Pillion, DPM    potassium chloride 20 mEq Oral BID Rujul Hinds, DO    pravastatin 40 mg Oral Daily With Carlitos Samuels, DPM    tamsulosin 0 4 mg Oral Daily With Carlitos Samuels, DPM         Today, Patient Was Seen By: Jay Knight    ** Please Note: This note has been constructed using a voice recognition system   **

## 2020-01-28 NOTE — PLAN OF CARE
Problem: PHYSICAL THERAPY ADULT  Goal: Performs mobility at highest level of function for planned discharge setting  See evaluation for individualized goals  Description  Treatment/Interventions: Functional transfer training, Therapeutic exercise, Elevations, Patient/family training, Equipment eval/education, Bed mobility, Gait training  Equipment Recommended: Gaby Esquivel, Wheelchair       See flowsheet documentation for full assessment, interventions and recommendations  Outcome: Progressing  Note:   Prognosis: Good  Problem List: Decreased strength, Decreased range of motion, Decreased endurance, Impaired balance, Decreased mobility, Decreased coordination, Pain, Orthopedic restrictions, Decreased safety awareness  Assessment: Pt seated in recliner upon arrival with encouragement pt agreeable to transfers and ambualtion trials  Pt requires supervision only for all sit to stand transfers x2 once standing pt was able to hop 5' with RW and SPT on RLE to sit on commode, ambulates 5' back to chair  Pt and wife were given extensive education about safety, sit to stand transfers and car transfers  All questions and concerns were addressed  Pt will continue to benenfit from skilled PT upon d/c to increase strength and endurance and maximize safe fucntional mobility  Barriers to Discharge: None  Barriers to Discharge Comments: ramped entrance, supportive wife  Recommendation: Short-term skilled PT, Home PT, Home with family support, 24 hour supervision/assist     PT - OK to Discharge: Yes(when medically ready)    See flowsheet documentation for full assessment

## 2020-01-28 NOTE — PLAN OF CARE
Problem: OCCUPATIONAL THERAPY ADULT  Goal: Performs self-care activities at highest level of function for planned discharge setting  See evaluation for individualized goals  Description  Treatment Interventions: ADL retraining, UE strengthening/ROM, Endurance training, Patient/family training, Equipment evaluation/education, Compensatory technique education, Energy conservation, Activityengagement          See flowsheet documentation for full assessment, interventions and recommendations  Outcome: Progressing  Note:   Limitation: Decreased ADL status, Decreased Safe judgement during ADL, Decreased cognition, Decreased endurance, Decreased self-care trans, Decreased high-level ADLs  Prognosis: Good  Assessment: Pt was seen for skilled OT with focus on completion of self care tasks, self toileting, review of fall prevention, RW safety and review of current plan of care  Reviewed at length Pt's current plan of care with return to home environment  Reviewed techs to maintain NWB status in LLE with self care routine as well as household transfers  Wife was present during tx session  SBA with functional transfers with support of RW  Pt was able to maintain NWB in RLE with activities instance for more than 2 mins  See above levels of A required for all functional tasks  Pt will benefit from continued home therapies to ease transition upon discharge as well as continued family support  Pt's wife reports Pt having commode, RW and shower chair at home for usage        OT Discharge Recommendation: Short Term Rehab  OT - OK to Discharge: Yes(when medically cleared)

## 2020-01-28 NOTE — PHYSICAL THERAPY NOTE
Physical Therapy Progress Note     01/28/20 1415   Pain Assessment   Pain Assessment 0-10   Pain Score 4   Pain Type Acute pain   Pain Location Hip   Pain Orientation Right   Hospital Pain Intervention(s) Medication (See MAR); Repositioned; Ambulation/increased activity; Distraction   Diversional Activities Television   Restrictions/Precautions   Weight Bearing Precautions Per Order Yes   LLE Weight Bearing Per Order NWB   Other Precautions WBS;O2;Fall Risk;Pain   General   Chart Reviewed Yes   Response to Previous Treatment Patient with no complaints from previous session  Family/Caregiver Present Yes   Cognition   Overall Cognitive Status WFL   Arousal/Participation Responsive   Attention Attends with cues to redirect   Orientation Level Oriented X4   Memory Decreased recall of precautions;Decreased recall of recent events;Decreased short term memory   Following Commands Follows one step commands without difficulty   Transfers   Sit to Stand 5  Supervision   Additional items Increased time required;Armrests   Stand to Sit 5  Supervision   Additional items Increased time required;Armrests   Stand pivot 5  Supervision   Additional items Increased time required   Ambulation/Elevation   Gait pattern Decreased foot clearance; Forward Flexion; Short stride  (hopping)   Gait Assistance 5  Supervision   Assistive Device Rolling walker   Distance 5'x2   Endurance Deficit   Endurance Deficit Yes   Activity Tolerance   Activity Tolerance Patient limited by fatigue;Patient limited by pain   Nurse Made Aware MARGAUX garner to see   Assessment   Prognosis Good   Problem List Decreased strength;Decreased range of motion;Decreased endurance; Impaired balance;Decreased mobility; Decreased coordination;Pain;Orthopedic restrictions;Decreased safety awareness   Assessment Pt seated in recliner upon arrival with encouragement pt agreeable to transfers and ambualtion trials   Pt requires supervision only for all sit to stand transfers x2 once standing pt was able to hop 5' with RW and SPT on RLE to sit on commode, ambulates 5' back to chair  Pt and wife were given extensive education about safety, sit to stand transfers and car transfers  All questions and concerns were addressed  Pt will continue to benenfit from skilled PT upon d/c to increase strength and endurance and maximize safe fucntional mobility  Barriers to Discharge None   Goals   Patient Goals to go home   STG Expiration Date 02/17/20   Short Term Goal #1 1) Perform all transfers Annalise demonstrating safe and appropriate technique 100% of the time in order to improve ability to negotiate safely in home environment  2) Amb with LRAD >25' with mod I in order to demonstrate ability to negotiate in home environment  3)  Improve overall functional strength and balance 1/2 grade in order to optimize ability to perform functional tasks and reduce fall risk  4) Increase activity tolerance to 45 minutes in order to improve endurance to functional tasks  5) PT for ongoing patient and family/caregiver education, DME needs and d/c planning in order to promote highest level of function in least restrictive environment  6) maintain NWB LLE during functional tasks 3/3  Plan   Treatment/Interventions Functional transfer training;LE strengthening/ROM; Therapeutic exercise; Endurance training;Bed mobility;Gait training;Patient/family training   Progress Progressing toward goals   PT Frequency   (3-5x/week)   Recommendation   Recommendation Short-term skilled PT; Home PT; Home with family support;24 hour supervision/assist   Equipment Recommended   (knee scooter)   PT - OK to Discharge Yes  (when medically ready)     Addendum: Pt not on O2 throughout session SpO2 dropped to 88% with hopping ambulation but was able to recover within 2 minutes of pursed lips breathing     Mayela Almendarez, PTA

## 2020-01-28 NOTE — RESPIRATORY THERAPY NOTE
Seen patient for home oxygen evaluation  Patient has weight bearing precautions on L foot in place at this time  RN made aware that due to this home evaluation was not completed at this time

## 2020-01-28 NOTE — PROGRESS NOTES
Progress Note - Infectious Disease   Fernando Herzog 75 y o  male MRN: [id number]  Unit/Bed#: E4 -01 Encounter: [id number]      Impression/Plan:  1  Sepsis   POA   Fever and leukocytosis   Likely secondary to MSSA bacteremia with left hallux wound and underlying osteopmyelitis as presumed source  No other clear source appreciated  Despite being systemically ill the patient has been hemodynamically stable  Repeat blood cultures were negative after 5 days  TTE and ELBERT were negative for valvlular vegetation and pacemaker lead abnormalities  -antibiotic as below  -monitor CBC and BMP  -monitor vitals  -supportive care     2  [place] in both sets of patient's initial blood cultures  Suspect patient's left great toe ulcer was source   Patient does have an implanted pacemaker  TTE and ELBERT were negative  Repeat blood cultures were clear after 5 days  Patient is currently receiving IV cefazolin and is tolerating without difficulty  He will complete 4 weeks of IV antibiotic treatment, through 2/6/2020   -continue IV cefazolin, dosed for renal function, through 2/6/2020 for 4 weeks of antibiotic treatment  -remove PICC after patient's final dose of IV antibiotics on 2/6/2020  -weekly CBCD and BMP while on IV antibiotics  -patient will require surveillance blood cultures two weeks after completion of antibiotic treatment, draw after 2/20/2020  -antibiotic and lab scripts dropped off in patient chart on 1/15/2020      3  Left hallux ulceration   With osteomyelitis as his wound does probe to bone and an x-ray of the left foot was suggestive of bony erosion at the 1st proximal phalanx   Purulence noted during initial podiatry wound exam, concern for possible infectious tenosynovitis   I suspect this was the source of patient's bacteremia above  [facility] is now status post amputation of hallux for presumed surgical cure of the ulcer and osteomyelitis  Intraop wound cultures showed MSSA  He had additional I&D and removal and sesamoid bone on 1/12/2020    -serial left foot exams  -follow up intraop wound cultures  -local wound care per Podiatry  -continue close follow-up with Podiatry     4  Acute kidney injury  On CKD  Per patient's medical records it appears his baseline creatinine has been approximately 1  Consider secondary to volume overload  Also consider urinary retention  He temporarily required a Jones catheter but is now voiding without difficulty  He is following closely with nephrology   His Bumex drip was stopped last night   -monitor BMP  -dose adjust antibiotics for renal function as needed  -continue close follow up with nephrology     5  Acute hypoxic respiratory failure   Suspect this is all secondary to pulmonary edema and diastolic heart failure   BNP and troponin were elevated upon admission  Initial chest x-ray and CT of the chest did not show consolidations indicative of a bacterial process  New chest imaging shows persistent CHF  Patient did require BiPAP support at night and was placed on a Bumex drip  His respiratory status has significantly improved and his oxygen saturation is now stable on nasal cannula O2  Bumex drip was turned off last night   -diuresis per Cardiology/nephrology  -monitor vitals  -monitor respiratory status  -continue close follow-up with cardiology  -continue close follow-up with nephrology     6  Type 2 diabetes mellitus with neuropathy   Patient's last hemoglobin A1c was 6 4% on 01/07/2020   This is risk factor for wounds and infection   Recommend tight glycemic control for overall health, especially in the setting of wound infection   -blood glucose management per primary service     7  Chronic diastolic heart failure  In setting of aortic and mitral valve stenosis and complete heart block   He has a Medtronic dual chamber pacemaker in place   Now with Staph aureus bacteremia   TTE and ELBERT were negative  He is following closely with Cardiology   -continue follow-up with Cardiology     8  Paroxysmal AFib   On Eliquis  Patient is stable for discharge from ID standpoint    Above plan was discussed in detail with patient at the bedside  Antibiotics:  Cefazolin 19  Antibiotics 21  Post op 20    Subjective:  Patient reports he is feeling well today  He has no pain in his L foot  Reports he got a good report from the podiatrist yesterday  He denies fever, chills, sweats, shakes; no nausea, vomiting, abdominal pain, diarrhea, or dysuria; no shortness of breath or chest pain but he continues to have an occasional dry cough  No new symptoms  Objective:  Vitals:  Temp:  [97 6 °F (36 4 °C)-98 1 °F (36 7 °C)] 98 °F (36 7 °C)  HR:  [60-65] 62  Resp:  [18-19] 18  BP: (154-169)/(70-73) 157/70  SpO2:  [96 %-100 %] 96 %  Temp (24hrs), Av 9 °F (36 6 °C), Min:97 6 °F (36 4 °C), Max:98 1 °F (36 7 °C)  Current: Temperature: 98 °F (36 7 °C)    Physical Exam:   General Appearance:  Alert, interactive, nontoxic, no acute distress  Patient wearing his glasses  Throat: Oropharynx moist without lesions  Lungs:   Clear to auscultation bilaterally; no wheezes, rhonchi or rales; respirations unlabored; patient on nasal cannula O2   Heart:  RRR; +murmur, no rub or gallop   Abdomen:   Soft, non-tender, non-distended, positive bowel sounds  Extremities: No clubbing or cyanosis; mild reduced B/L LE non pitting edema, L>R   Skin: No new rashes, lesions, or draining wounds noted on exposed skin   L foot dressing is dry and intact, no spreading erythema from bandage site, he is wearing a surgical shoe     Labs, Imaging, & Other studies:   All pertinent labs and imaging studies were personally reviewed  Results from last 7 days   Lab Units 20  0554 20  0618 20  1349   WBC Thousand/uL 9 57 5 43 10 48*   HEMOGLOBIN g/dL 8 0* 15 0 8 6*   PLATELETS Thousands/uL 343 248 387     Results from last 7 days   Lab Units 20  0554   POTASSIUM mmol/L 3 8   CHLORIDE mmol/L 99*   CO2 mmol/L 28   BUN mg/dL 132*   CREATININE mg/dL 2 14*   EGFR ml/min/1 73sq m 31   CALCIUM mg/dL 9 1

## 2020-01-29 ENCOUNTER — TELEPHONE (OUTPATIENT)
Dept: OTHER | Facility: HOSPITAL | Age: 65
End: 2020-01-29

## 2020-01-29 ENCOUNTER — TELEPHONE (OUTPATIENT)
Dept: NEPHROLOGY | Facility: CLINIC | Age: 65
End: 2020-01-29

## 2020-01-29 VITALS
SYSTOLIC BLOOD PRESSURE: 152 MMHG | TEMPERATURE: 97.7 F | OXYGEN SATURATION: 94 % | HEART RATE: 60 BPM | DIASTOLIC BLOOD PRESSURE: 69 MMHG | RESPIRATION RATE: 18 BRPM | BODY MASS INDEX: 30.16 KG/M2 | WEIGHT: 208.34 LBS

## 2020-01-29 LAB
ANION GAP SERPL CALCULATED.3IONS-SCNC: 9 MMOL/L (ref 4–13)
BUN SERPL-MCNC: 121 MG/DL (ref 5–25)
CALCIUM SERPL-MCNC: 8.5 MG/DL (ref 8.3–10.1)
CHLORIDE SERPL-SCNC: 103 MMOL/L (ref 100–108)
CO2 SERPL-SCNC: 29 MMOL/L (ref 21–32)
CREAT SERPL-MCNC: 1.88 MG/DL (ref 0.6–1.3)
GFR SERPL CREATININE-BSD FRML MDRD: 36 ML/MIN/1.73SQ M
GLUCOSE SERPL-MCNC: 102 MG/DL (ref 65–140)
GLUCOSE SERPL-MCNC: 210 MG/DL (ref 65–140)
GLUCOSE SERPL-MCNC: 67 MG/DL (ref 65–140)
POTASSIUM SERPL-SCNC: 3.7 MMOL/L (ref 3.5–5.3)
SODIUM SERPL-SCNC: 141 MMOL/L (ref 136–145)

## 2020-01-29 PROCEDURE — 93970 EXTREMITY STUDY: CPT | Performed by: SURGERY

## 2020-01-29 PROCEDURE — 80048 BASIC METABOLIC PNL TOTAL CA: CPT | Performed by: INTERNAL MEDICINE

## 2020-01-29 PROCEDURE — 99233 SBSQ HOSP IP/OBS HIGH 50: CPT | Performed by: INTERNAL MEDICINE

## 2020-01-29 PROCEDURE — 99232 SBSQ HOSP IP/OBS MODERATE 35: CPT | Performed by: INTERNAL MEDICINE

## 2020-01-29 PROCEDURE — 99239 HOSP IP/OBS DSCHRG MGMT >30: CPT | Performed by: INTERNAL MEDICINE

## 2020-01-29 PROCEDURE — 82948 REAGENT STRIP/BLOOD GLUCOSE: CPT

## 2020-01-29 PROCEDURE — 94660 CPAP INITIATION&MGMT: CPT

## 2020-01-29 RX ORDER — DOXYCYCLINE HYCLATE 50 MG/1
324 CAPSULE, GELATIN COATED ORAL
Qty: 30 TABLET | Refills: 0 | Status: SHIPPED | OUTPATIENT
Start: 2020-01-29 | End: 2020-02-21 | Stop reason: SDUPTHER

## 2020-01-29 RX ADMIN — METOPROLOL TARTRATE 50 MG: 50 TABLET, FILM COATED ORAL at 08:20

## 2020-01-29 RX ADMIN — AMLODIPINE BESYLATE 10 MG: 10 TABLET ORAL at 08:20

## 2020-01-29 RX ADMIN — POTASSIUM CHLORIDE 20 MEQ: 1500 TABLET, EXTENDED RELEASE ORAL at 08:20

## 2020-01-29 RX ADMIN — FERROUS GLUCONATE 324 MG: 324 TABLET ORAL at 05:44

## 2020-01-29 RX ADMIN — FUROSEMIDE 80 MG: 80 TABLET ORAL at 08:20

## 2020-01-29 RX ADMIN — CEFAZOLIN SODIUM 1000 MG: 1 SOLUTION INTRAVENOUS at 05:45

## 2020-01-29 RX ADMIN — INSULIN LISPRO 4 UNITS: 100 INJECTION, SOLUTION INTRAVENOUS; SUBCUTANEOUS at 11:59

## 2020-01-29 RX ADMIN — APIXABAN 5 MG: 5 TABLET, FILM COATED ORAL at 08:20

## 2020-01-29 RX ADMIN — GUAIFENESIN 600 MG: 600 TABLET ORAL at 08:20

## 2020-01-29 RX ADMIN — ALLOPURINOL 300 MG: 100 TABLET ORAL at 08:20

## 2020-01-29 NOTE — TELEPHONE ENCOUNTER
NURSE IS CALLING IN BECAUSE SHE NEEDS VERIFICATION ON THE MEDS PT SUPPOSED TO BE TAKING THERE 2 DIFFERENT INSTRUCTIONS

## 2020-01-29 NOTE — TELEPHONE ENCOUNTER
----- Message from Karissa Calzada MD sent at 1/28/2020  1:09 PM EST -----  Regarding: josué  Please schedule the patient for hospital discharge follow-up for acute kidney injury to be seen in 1-2 weeks  Please send the patient orders for renal function panel, magnesium, phosphorus prior to the visit  Patient usually follows up with Dr Felix Bernstein as an outpatient      Thanks  rian

## 2020-01-29 NOTE — DISCHARGE SUMMARY
Discharge Summary - [facility]    Patient Information: Mariposa Guillen 75 y o  male MRN: [id number]  Unit/Bed#: E4 -01 Encounter: [id number]    Discharging Physician / Practitioner: Philippe Henderson  PCP: Vince Wright DO  Admission Date: 1/7/2020  Discharge Date: 01/29/20    Disposition:     Home    Reason for Admission:  Sepsis    Discharge Diagnoses:     Principal Problem:    Acute respiratory failure with hypoxia ([facility] )  Active Problems:    Complete heart block ([facility])    Severe sepsis ([facility])    PAF (paroxysmal atrial fibrillation) ([facility])    Urinary retention    NICOLASA (obstructive sleep apnea)    Acute diastolic (congestive) heart failure ([facility] [address] )    Essential hypertension    Acute renal failure superimposed on stage 3 chronic kidney disease ([facility])    Anemia    Elevated troponin I level    Staphylococcus aureus bacteremia    Type 2 diabetes mellitus with diabetic neuropathy, without long-term current use of insulin ([facility])    Iron deficiency anemia    Anxiety  Resolved Problems:    Leukocytosis    Pyogenic inflammation of bone ([facility] [address] )    Hyponatremia    Hypokalemia      Consultations During Hospital Stay:  Cardiology/Nephrology  Procedures Performed:   · Hunter/TTe    Significant Findings / Test Results:     No endocarditis    Incidental Findings:   None  Test Results Pending at Discharge (will require follow up): · None     Outpatient Tests Requested:  · Follow-up with PCP/CBC weekly    Complications:  No known complications    Hospital Course:     Mariposa Guillen is a 75y o  year old male who presented to the [place] [address] Emergency Department on 01/07/2020 with fatigue and malaise for 2-3 days    The patient has a past medical and surgical history significant for congestive heart failure, mitral valve stenosis, dentures, vertigo, glasses, rectal dysfunction, gout, osteoarthritis, type 2 diabetes mellitus, eczema, hyperlipidemia, heart murmur, peripheral neuropathy, peripheral arterial disease, bilateral great toe infections, hypertension, arthritis, sick sinus syndrome, pacemaker, diastolic heart dysfunction, former smoker, vasectomy, left knee arthroscopy, new right knee arthroscopy, bilateral carpal tunnel release, left shoulder arthroscopy, left 2nd toe amputation, and bilateral great toe partial amputations  He has allergies to Invokana, Lamisil, latex, and medical tape  He reported he has experienced some fevers, chills, and rigors  Also has had a poor appetite  He has an occasional nonproductive cough and a slight sore throat  He cannot identify any recent sick contacts  Upon arrival to the ED the patient's temperature was 99 1° with a heart rate of 76  His temperature quickly escalated to 103 6  His WBC count was 20 41  His creatinine was 2 08 with a GFR of 32  Blood cultures were sent  A flu/RSV PCR was sent  BNP and troponin were elevated  He was taken for chest x-ray which was concerning for pulmonary edema  The patient was started on ceftriaxone and azithromycin  He was admitted for additional medical management  After his admission his antibiotic regimen was continued with ceftriaxone and azithromycin  He was taken for a follow-up chest CT which continued to show pulmonary edema  He temporarily required high-flow O2  His procalcitonin was checked and was found to be elevated at 0 67  Cardiology was consulted and are following the patient closely  Patient reported that he has a chronic wound on his left great toe which has become inflamed and reddened  Podiatry has assessed the patient and found that the wound probes to bone  Purulence was expressed during the exam   He will require amputation of the hallux which is tentatively scheduled for tonight  Patient's blood cultures are now showing gram-positive cocci in clusters in both sets  Infectious disease had been asked for formal consult for Gram-positive bacteremia    Patient was subsequently admitted and treated for sepsis Gram-positive bacteremia and underwent extensive evaluation that included TTE and ELBERT  After extensive evaluation following recommendation has been made to the patient  Maritza Fell in both sets of patient's initial blood cultures  Suspect patient's left great toe ulcer was source   Patient does have an implanted pacemaker  TTE and ELBERT were negative  Repeat blood cultures were clear after 5 days  Patient is currently receiving IV cefazolin and is tolerating without difficulty  He is completing 4 weeks of IV antibiotic treatment, through 2/6/2020   -continue IV cefazolin, dosed for renal function, through 2/6/2020 for 4 weeks of antibiotic treatment  -remove PICC after patient's final dose of IV antibiotics on 2/6/2020  -weekly CBCD  while on IV antibiotics  -patient will require surveillance blood cultures two weeks after completion of antibiotic treatment, draw after 2/20/2020, this will be coordinated by the outpatient ID office               Condition at Discharge: good     Discharge Day Visit / Exam:     Subjective:  No complaint/treatment plan as above discussed with patient's wife by bedside  Vitals: Blood Pressure: 152/69 (01/29/20 0719)  Pulse: 60 (01/29/20 0719)  Temperature: 97 7 °F (36 5 °C) (01/29/20 0719)  Temp Source: Temporal (01/29/20 0719)  Respirations: 18 (01/29/20 0719)  Weight - Scale: 94 5 kg (208 lb 5 4 oz) (01/29/20 0530)  SpO2: 94 % (01/29/20 0719)        Discharge instructions/Information to patient and family:   See after visit summary for information provided to patient and family  Provisions for Follow-Up Care:  See after visit summary for information related to follow-up care and any pertinent home health orders  Planned Readmission:  None     Discharge Statement:  I spent 45 minutes discharging the patient  This time was spent on the day of discharge  I had direct contact with the patient on the day of discharge   Greater than 50% of the total time was spent examining patient, answering all patient questions, arranging and discussing plan of care with patient as well as directly providing post-discharge instructions  Additional time then spent on discharge activities  Discharge Medications:  See after visit summary for reconciled discharge medications provided to patient and family        ** Please Note: This note has been constructed using a voice recognition system **

## 2020-01-29 NOTE — PROGRESS NOTES
Progress Note - Infectious Disease   Fernando Herzog 75 y o  male MRN: [id number]  Unit/Bed#: E4 -01 Encounter: [id number]      Impression/Plan:  1  Sepsis   POA   Fever and leukocytosis   Likely secondary to MSSA bacteremia with left hallux wound and underlying osteopmyelitis as presumed source  No other clear source appreciated  Despite being systemically ill the patient has been hemodynamically stable  Repeat blood cultures were negative after 5 days  TTE and ELBERT were negative for valvlular vegetation and pacemaker lead abnormalities  -antibiotic as below  -monitor CBC and BMP  -monitor vitals  -supportive care     2  [place] in both sets of patient's initial blood cultures  Suspect patient's left great toe ulcer was source   Patient does have an implanted pacemaker  TTE and ELBERT were negative  Repeat blood cultures were clear after 5 days  Patient is currently receiving IV cefazolin and is tolerating without difficulty  He is completing 4 weeks of IV antibiotic treatment, through 2/6/2020   -continue IV cefazolin, dosed for renal function, through 2/6/2020 for 4 weeks of antibiotic treatment  -remove PICC after patient's final dose of IV antibiotics on 2/6/2020  -weekly CBCD and BMP while on IV antibiotics  -patient will require surveillance blood cultures two weeks after completion of antibiotic treatment, draw after 2/20/2020, this will be coordinated by the outpatient ID office and I will review  -antibiotic and lab scripts dropped off in patient chart on 1/15/2020     3  Left hallux ulceration   With osteomyelitis as his wound does probe to bone and an x-ray of the left foot was suggestive of bony erosion at the 1st proximal phalanx   Purulence noted during initial podiatry wound exam, concern for possible infectious tenosynovitis   I suspect this was the source of patient's bacteremia above  [facility] [place] [facility] post amputation of hallux for presumed surgical cure of the ulcer and osteomyelitis  Intraop wound cultures showed MSSA  He had additional I&D and removal and sesamoid bone on 1/12/2020    -serial left foot exams  -follow up intraop wound cultures  -local wound care per Podiatry  -continue close follow-up with Podiatry     4  Acute kidney injury  On CKD  Per patient's medical records it appears his baseline creatinine has been approximately 1  Consider secondary to volume overload  Also consider urinary retention   He temporarily required a Jones catheter but is now voiding without difficulty  His creatinine has improved to 1 88 today  [facility] is following closely with nephrology  -monitor BMP  -dose adjust antibiotics for renal function as needed  -continue close follow up with nephrology     5  Acute hypoxic respiratory failure   Suspect this is all secondary to pulmonary edema and diastolic heart failure   BNP and troponin were elevated upon admission  Initial chest x-ray and CT of the chest did not show consolidations indicative of a bacterial process  New chest imaging shows persistent CHF  Patient did require BiPAP support at night and was placed on a Bumex drip   His respiratory status has significantly improved and his oxygen saturation is now stable on room air   Bumex drip was stopped  -monitor vitals  -monitor respiratory status  -continue close follow-up with cardiology  -continue close follow-up with nephrology     6  Type 2 diabetes mellitus with neuropathy   Patient's last hemoglobin A1c was 6 4% on 01/07/2020   This is risk factor for wounds and infection   Recommend tight glycemic control for overall health, especially in the setting of wound infection   -blood glucose management per primary service     7  Chronic diastolic heart failure  In setting of aortic and mitral valve stenosis and complete heart block   He has a [employer] dual chamber pacemaker in place   Now with Staph aureus bacteremia   TTE and ELBERT were negative  He is following closely with Cardiology   -continue follow-up with Cardiology     8  Paroxysmal AFib   On Eliquis  Patient is stable for discharge from ID standpoint     Above plan was discussed in detail with patient at the bedside  Antibiotics:  Cefazolin 20  Antibiotics 22  Post op 21    Subjective:  Patient reports he is feeling very well today  Patient says that his oxygen has been turned off and he feels that his breathing has been great  He denies shortness of breath, difficulty taking deep breaths, wheezing, cough  He has no fever, chills, sweats, shakes; no nausea, vomiting, abdominal pain, diarrhea, or dysuria  He has no concerns with his left foot dressing and states he is starting to get used to his surgical shoe  He tells me he has made outpatient follow-up with Podiatry for next week  No new symptoms  He is hopeful that he will be able to go home today  Objective:  Vitals:  Temp:  [97 7 °F (36 5 °C)-98 4 °F (36 9 °C)] 97 7 °F (36 5 °C)  HR:  [60-66] 60  Resp:  [18-19] 18  BP: (152-159)/(69-73) 152/69  SpO2:  [94 %-100 %] 94 %  Temp (24hrs), Av 2 °F (36 8 °C), Min:97 7 °F (36 5 °C), Max:98 4 °F (36 9 °C)  Current: Temperature: 97 7 °F (36 5 °C)    Physical Exam:   General Appearance:  Alert, interactive, nontoxic, no acute distress  Patient is wearing his glasses  Throat: Oropharynx moist without lesions  Lungs:   Clear to auscultation bilaterally; no wheezes, rhonchi or rales; respirations unlabored; patient is on room air   Heart:  RRR; +murmur, no rub or gallop   Abdomen:   Soft, non-tender, non-distended, positive bowel sounds  Extremities: No clubbing or cyanosis; trace bilateral lower extremity edema   Skin: No new rashes, lesions, or draining wounds noted on exposed skin    Left foot dressing is clean/dry/intact, no spreading erythema from bandage site, he is wearing a surgical shoe     Labs, Imaging, & Other studies:   All pertinent labs and imaging studies were personally reviewed  Results from last 7 days   Lab Units 01/28/20  0554 01/25/20  0618 01/24/20  1349   WBC Thousand/uL 9 57 5 43 10 48*   HEMOGLOBIN g/dL 8 0* 15 0 8 6*   PLATELETS Thousands/uL 343 248 387     Results from last 7 days   Lab Units 01/29/20  0529   POTASSIUM mmol/L 3 7   CHLORIDE mmol/L 103   CO2 mmol/L 29   BUN mg/dL 121*   CREATININE mg/dL 1 88*   EGFR ml/min/1 73sq m 36   CALCIUM mg/dL 8 5

## 2020-01-29 NOTE — PLAN OF CARE
Problem: Potential for Falls  Goal: Patient will remain free of falls  Description  INTERVENTIONS:  - Assess patient frequently for physical needs  -  Identify cognitive and physical deficits and behaviors that affect risk of falls    -  [place] fall precautions as indicated by assessment   - Educate patient/family on patient safety including physical limitations  - Instruct patient to call for assistance with activity based on assessment  - Modify environment to reduce risk of injury  - Consider OT/PT consult to assist with strengthening/mobility  Outcome: Progressing     Problem: PAIN - ADULT  Goal: Verbalizes/displays adequate comfort level or baseline comfort level  Description  Interventions:  - Encourage patient to monitor pain and request assistance  - Assess pain using appropriate pain scale  - Administer analgesics based on type and severity of pain and evaluate response  - Implement non-pharmacological measures as appropriate and evaluate response  - Consider cultural and social influences on pain and pain management  - Notify physician/advanced practitioner if interventions unsuccessful or patient reports new pain  Outcome: Progressing     Problem: INFECTION - ADULT  Goal: Absence or prevention of progression during hospitalization  Description  INTERVENTIONS:  - Assess and monitor for signs and symptoms of infection  - Monitor lab/diagnostic results  - Monitor all insertion sites, i e  indwelling lines, tubes, and drains  - Administer medications as ordered  - Instruct and encourage patient and family to use good hand hygiene technique  - Identify and instruct in appropriate isolation precautions for identified infection/condition   Outcome: Progressing     Problem: SAFETY ADULT  Goal: Maintain or return to baseline ADL function  Description  INTERVENTIONS:  -  Assess patient's ability to carry out ADLs; assess patient's baseline for ADL function and identify physical deficits which impact ability to perform ADLs (bathing, care of mouth/teeth, toileting, grooming, dressing, etc )  - Assess/evaluate cause of self-care deficits   - Assess range of motion  - Assess patient's mobility; develop plan if impaired  - Assess patient's need for assistive devices and provide as appropriate  - Encourage maximum independence but intervene and supervise when necessary  - Involve family in performance of ADLs  - Assess for home care needs following discharge   - Consider OT consult to assist with ADL evaluation and planning for discharge  - Provide patient education as appropriate  Outcome: Progressing  Goal: Maintain or return mobility status to optimal level  Description  INTERVENTIONS:  - Assess patient's baseline mobility status (ambulation, transfers, stairs, etc )    - Identify cognitive and physical deficits and behaviors that affect mobility  - Identify mobility aids required to assist with transfers and/or ambulation (gait belt, sit-to-stand, lift, walker, cane, etc )  - [place] fall precautions as indicated by assessment  - Record patient progress and toleration of activity level on Mobility SBAR; progress patient to next Phase/Stage  - Instruct patient to call for assistance with activity based on assessment  - Consider rehabilitation consult to assist with strengthening/weightbearing, etc   Outcome: Progressing     Problem: DISCHARGE PLANNING  Goal: Discharge to home or other facility with appropriate resources  Description  INTERVENTIONS:  - Identify barriers to discharge w/patient and caregiver  - Arrange for needed discharge resources and transportation as appropriate  - Identify discharge learning needs (meds, wound care, etc )  - Arrange for interpretive services to assist at discharge as needed  - Refer to Case Management Department for coordinating discharge planning if the patient needs post-hospital services based on physician/advanced practitioner order or complex needs related to functional status, cognitive ability, or social support system  Outcome: Progressing     Problem: Knowledge Deficit  Goal: Patient/family/caregiver demonstrates understanding of disease process, treatment plan, medications, and discharge instructions  Description  Complete learning assessment and assess knowledge base    Interventions:  - Provide teaching at level of understanding  - Provide teaching via preferred learning methods  Outcome: Progressing     Problem: CARDIOVASCULAR - ADULT  Goal: Maintains optimal cardiac output and hemodynamic stability  Description  INTERVENTIONS:  - Monitor I/O, vital signs and rhythm  - Monitor for S/S and trends of decreased cardiac output  - Administer and titrate ordered vasoactive medications to optimize hemodynamic stability  - Assess quality of pulses, skin color and temperature  - Assess for signs of decreased coronary artery perfusion  - Instruct patient to report change in severity of symptoms  Outcome: Progressing  Goal: Absence of cardiac dysrhythmias or at baseline rhythm  Description  INTERVENTIONS:  - Continuous cardiac monitoring, vital signs, obtain 12 lead EKG if ordered  - Administer antiarrhythmic and heart rate control medications as ordered  - Monitor electrolytes and administer replacement therapy as ordered  Outcome: Progressing     Problem: RESPIRATORY - ADULT  Goal: Achieves optimal ventilation and oxygenation  Description  INTERVENTIONS:  - Assess for changes in respiratory status  - Assess for changes in mentation and behavior  - Position to facilitate oxygenation and minimize respiratory effort  - Oxygen administered by appropriate delivery if ordered  - Initiate smoking cessation education as indicated  - Encourage broncho-pulmonary hygiene including cough, deep breathe, Incentive Spirometry  - Assess the need for suctioning and aspirate as needed  - Assess and instruct to report SOB or any respiratory difficulty  - Respiratory Therapy support as indicated  Outcome: Progressing     Problem: Prexisting or High Potential for Compromised Skin Integrity  Goal: Skin integrity is maintained or improved  Description  INTERVENTIONS:  - Identify patients at risk for skin breakdown  - Assess and monitor skin integrity  - Assess and monitor nutrition and hydration status  - Monitor labs   - Assess for incontinence   - Turn and reposition patient  - Assist with mobility/ambulation  - Relieve pressure over bony prominences  - Avoid friction and shearing  - Provide appropriate hygiene as needed including keeping skin clean and dry  - Evaluate need for skin moisturizer/barrier cream  - Collaborate with interdisciplinary team   - Patient/family teaching  - Consider wound care consult   Outcome: Progressing     Problem: Nutrition/Hydration-ADULT  Goal: Nutrient/Hydration intake appropriate for improving, restoring or maintaining nutritional needs  Description  Monitor and assess patient's nutrition/hydration status for malnutrition  Collaborate with interdisciplinary team and initiate plan and interventions as ordered  Monitor patient's weight and dietary intake as ordered or per policy  Utilize nutrition screening tool and intervene as necessary  Determine patient's food preferences and provide high-protein, high-caloric foods as appropriate       INTERVENTIONS:  - Monitor oral intake, urinary output, labs, and treatment plans  - Assess nutrition and hydration status and recommend course of action  - Evaluate amount of meals eaten  - Assist patient with eating if necessary   - Allow adequate time for meals  - Recommend/ encourage appropriate diets, oral nutritional supplements, and vitamin/mineral supplements  - Order, calculate, and assess calorie counts as needed  - Recommend, monitor, and adjust tube feedings and TPN/PPN based on assessed needs  - Assess need for intravenous fluids  - Provide specific nutrition/hydration education as appropriate  - Include patient/family/caregiver in decisions related to nutrition  Outcome: Progressing     Problem: METABOLIC, FLUID AND ELECTROLYTES - ADULT  Goal: Glucose maintained within target range  Description  INTERVENTIONS:  - Monitor Blood Glucose as ordered  - Assess for signs and symptoms of hyperglycemia and hypoglycemia  - Administer ordered medications to maintain glucose within target range  - Assess nutritional intake and initiate nutrition service referral as needed  Outcome: Progressing     Problem: SKIN/TISSUE INTEGRITY - ADULT  Goal: Skin integrity remains intact  Description  INTERVENTIONS  - Identify patients at risk for skin breakdown  - Assess and monitor skin integrity  - Assess and monitor nutrition and hydration status  - Monitor labs (i e  albumin)  - Assess for incontinence   - Turn and reposition patient  - Assist with mobility/ambulation  - Relieve pressure over bony prominences  - Avoid friction and shearing  - Provide appropriate hygiene as needed including keeping skin clean and dry  - Evaluate need for skin moisturizer/barrier cream  - Collaborate with interdisciplinary team (i e  Nutrition, Rehabilitation, etc )   - Patient/family teaching  Outcome: Progressing  Goal: Incision(s), wounds(s) or drain site(s) healing without S/S of infection  Description  INTERVENTIONS  - Assess and document risk factors for skin impairment   - Assess and document dressing, incision, wound bed, drain sites and surrounding tissue  - Consider nutrition services referral as needed  - Oral mucous membranes remain intact  - Provide patient/ family education  Outcome: Progressing  Goal: Oral mucous membranes remain intact  Description  INTERVENTIONS  - Assess oral mucosa and hygiene practices  - Implement preventative oral hygiene regimen  - Implement oral medicated treatments as ordered  - Initiate Nutrition services referral as needed  Outcome: Progressing

## 2020-01-29 NOTE — PROGRESS NOTES
Follow up Consultation    Nephrology   Shaina Toribio 75 y o  male MRN: [id number]  Unit/Bed#: E4 -01 Encounter: [id number]      Physician Requesting Consult: Wil Thompson  Reason for Consult:  Acute kidney injury       ASSESSMENT/PLAN:    1)Acute kidney injury (POA) on CKD stage 3:  - ELZBIETA most likely secondary to cardiorenal syndrome with component of obstructive uropathy likely leading to ATN  - After review of records it appears that the patient has a baseline Creatinine of 1-1 5 mg/dL  - patient's creatinine today is at 1 88 mg/dL stable and improved from yesterday  - Avoid nephrotoxins, adjust meds to appropriate GFR   - Acid base and lytes stable   - clinically patient appears to be minimally hypervolemic  - diuretics per Cardiology  - was on Bumex drip and now on p o  Lasix as of 01/26/2020 of 80 mg p o  B i d   - status post metolazone x1 on 01/25/2020  - Clinically patient is not uremic and there is no acute indication for renal replacement therapy (dialysis)  - Optimize hemodynamic status to avoid delay in renal recovery  - check BMP, magnesium, phosphorus in a m   - Await renal recovery  - Place on a renal diet when allowed diet order    - Strict I/O  - patient will need follow-up with Nephrology as an outpatient upon discharge with labs in 1 week, message sent to the office   - patient follows up with Fior Sykes for nephrology as an outpatient    2)Blood pressure/hypertension:  - Optimize hemodynamics   - Maintain MAP > 65mmHg  - Avoid BP fluctuations  - on Norvasc 10 mg p o   Q day and metoprolol 50 mg p o  B i d  Lasix 80 mg p o  B i d     3)H/H/anemia:  - most recent hemoglobin at 8 grams/deciliter  - maintain hemoglobin greater than 8 grams/deciliter  - on ferrous sulfate    4)Volume status:  - Clinically patient appears to be minimally hypervolemic  - on Lasix 80 mg p o  B i d   - diuretics per Cardiology    5)Hyponatremia:  - Likely due to decreased free water clearance  - Likely due to decreased distal sodium delivery  - Continue to monitor for now, most recent sodium 141 mEq  - resolved  - continue fluid restriction    6)CHF:  - Management as per primary team  - most recent EF of 55%  - follow-up with cardiology  - continue daily weights and intake and now take    7) hypokalemia:  - check BMP, potassium improved to 3 7  - on potassium supplements    8) BPH/obstruction:  - on Flomax  - Jones has been removed    Thanks for the consult  Will continue to follow  Please call with questions/ concerns  Above-mentioned orders and Plan in terms of acute kidney injury was discussed with the team in depth  Stable from renal standpoint for discharge when medically cleared  Magali Rasmussen MD, FASN, 2020, 12:18 PM              Objective :   Patient seen and examined in his room no overnight events hemodynamically stable spouse at bedside happy renal parameters continue improved urine output close to 1 6 L last 24 hours  PHYSICAL EXAM  /69 (BP Location: Left arm)   Pulse 60   Temp 97 7 °F (36 5 °C) (Temporal)   Resp 18   Wt 94 5 kg (208 lb 5 4 oz)   SpO2 94%   BMI 30 16 kg/m²   Temp (24hrs), Av 2 °F (36 8 °C), Min:97 7 °F (36 5 °C), Max:98 4 °F (36 9 °C)        Intake/Output Summary (Last 24 hours) at 2020 1218  Last data filed at 2020 0900  Gross per 24 hour   Intake 1060 ml   Output 1675 ml   Net -615 ml       I/O last 24 hours: In: 1300 [P O :1140; IV Piggyback:160]  Out: 1675 [Urine:1675]      Current Weight: Weight - Scale: 94 5 kg (208 lb 5 4 oz)  First Weight: Weight - Scale: 97 2 kg (214 lb 4 6 oz)  Physical Exam   Constitutional: He is oriented to person, place, and time  He appears well-developed and well-nourished  No distress  HENT:   Head: Normocephalic and atraumatic  Mouth/Throat: Oropharynx is clear and moist  No oropharyngeal exudate  Eyes: Conjunctivae are normal  No scleral icterus  Neck: Neck supple  No JVD present  No tracheal deviation present  Cardiovascular: Normal heart sounds  Exam reveals no friction rub  Pulmonary/Chest: Effort normal  He has no wheezes  He has no rales  Abdominal: Soft  He exhibits no distension and no mass  There is no tenderness  Musculoskeletal: He exhibits edema  Trace edema bilateral lower extremities   Neurological: He is alert and oriented to person, place, and time  Skin: Skin is warm  He is not diaphoretic  No pallor  Psychiatric: He has a normal mood and affect  His behavior is normal    Nursing note and vitals reviewed  Review of Systems   Constitutional: Negative for appetite change, chills, fatigue and fever  HENT: Negative for congestion  Respiratory: Negative for cough, shortness of breath and wheezing  Cardiovascular: Negative for chest pain and leg swelling  Gastrointestinal: Negative for abdominal pain, constipation, diarrhea, nausea and vomiting  Genitourinary: Negative for dysuria  Musculoskeletal: Negative for back pain  Skin: Negative for rash  Neurological: Negative for dizziness and headaches  Psychiatric/Behavioral: Negative for agitation and confusion  All other systems reviewed and are negative        Scheduled Meds:    Current Facility-Administered Medications:  acetaminophen 650 mg Oral Q6H PRN Melissa Alonso, DO    allopurinol 300 mg Oral QAM Igor Carver DPM    amLODIPine 10 mg Oral Daily MOE Michael    apixaban 5 mg Oral BID Melissa Celeste, DO    cefazolin 1,000 mg Intravenous Q8H Jorge Wells DO Last Rate: Stopped (01/29/20 0047)   docusate sodium 100 mg Oral BID Melissa Ambron, DO    ferrous gluconate 324 mg Oral BID AC Melissa Lauraron, DO    furosemide 80 mg Oral BID (diuretic) Lavetta Opitz, MD    guaiFENesin 600 mg Oral BID Igor Carver DPM    insulin glargine 22 Units Subcutaneous HS Melissa Alonso DO    insulin lispro 2-12 Units Subcutaneous TID AC Igor Carver DPM    ipratropium-albuterol 3 mL Nebulization Q6H PRN Igor Carver DPM    LORazepam 0 5 mg Oral Q8H PRN Melissa Ambron, DO    melatonin 6 mg Oral HS Viola Flatten, DPM    metoprolol tartrate 50 mg Oral Q12H [address] Viola Flatten, DPM    oxyCODONE 5 mg Oral Q4H PRN Viola Flatten, DPM    potassium chloride 20 mEq Oral BID Rujul Hinds, DO    pravastatin 40 mg Oral Daily With Daryl Seth Kasey, DPM    tamsulosin 0 4 mg Oral Daily With Fiona Ibanez DPM        PRN Meds:   acetaminophen    ipratropium-albuterol    LORazepam    oxyCODONE    Continuous Infusions:       Invasive Devices: Invasive Devices     Peripherally Inserted Central Catheter Line            PICC Line 18/09/62 Right Basilic 15 days                  LABORATORY:    Results from last 7 days   Lab Units 01/29/20  0529 01/28/20  0554 01/27/20  0537 01/26/20  0855 01/25/20  0618 01/24/20  1349 01/23/20  0626   WBC Thousand/uL  --  9 57  --   --  5 43 10 48* 11 59*   HEMOGLOBIN g/dL  --  8 0*  --   --  15 0 8 6* 8 1*   HEMATOCRIT %  --  24 5*  --   --  46 0 26 4* 24 8*   PLATELETS Thousands/uL  --  343  --   --  248 387 399*   POTASSIUM mmol/L 3 7 3 8 3 4* 3 6 3 5 4 2 3 3*   CHLORIDE mmol/L 103 99* 99* 96* 97* 95* 97*   CO2 mmol/L 29 28 30 29 28 27 26   BUN mg/dL 121* 132* 140* 143* 131* 131* 128*   CREATININE mg/dL 1 88* 2 14* 2 24* 2 45* 2 44* 2 59* 2 58*   CALCIUM mg/dL 8 5 9 1 9 0 8 9 9 0 8 8 8 8   MAGNESIUM mg/dL  --   --   --   --  1 7  --   --       rest all reviewed    RADIOLOGY:  VAS lower limb venous duplex study, complete bilateral   Final Result by Diego Castro MD (01/29 0915)      XR chest pa & lateral   Final Result by Glenny Viera MD (01/24 1333)      Partial improvement of pulmonary edema and bilateral effusions            Workstation performed: [id number]         VAS upper limb venous duplex scan, unilateral/limited   Final Result by Mitch Waldron DO (01/22 1537)      XR chest portable   Final Result by Yuliya Dowd MD (01/21 7411)   Stable tubes and lines without pneumothorax     Persistent CHF with asymmetric airspace opacities and left basilar effusion  Workstation performed: [id number]         XR chest portable   Final Result by Claudean Holes, MD (01/20 1148)      Persistent pulmonary edema with increasing left pleural effusion            Workstation performed: [id number]         VAS lower limb venous duplex study, unilateral/limited   Final Result by Joana Cervantes MD (01/20 1645)      US kidney and bladder   Final Result by James Caraballo MD (01/20 0701)         1  Echogenic kidneys consistent with medical renal disease  No hydronephrosis  2   Simple cyst within the midpole of the right kidney  Workstation performed: [id number]         XR chest portable   Final Result by Echo Hester MD (01/15 1407)      Left pleural effusion appears somewhat larger than the prior study  No other significant interval change            Workstation performed: [id number]         IR PICC repo   Final Result by Ion Ha MD (01/14 1446)      Spontaneous repositioning of a right arm PICC with tip now in the superior vena cava  PICC is okay to use for durable venous access  Workstation performed: [id number]         VAS lower limb arterial duplex, complete bilateral   Final Result by Sajan Cook MD (01/14 1056)      XR chest PICC line portable   Final Result by Alejandra Richards MD (01/13 9648)      Right PICC line terminating in the right internal jugular vein  Congestive heart failure  Left lower lobe consolidation  Workstation performed: [id number]         XR foot left 3+ views   Final Result by Hussain Snow MD (01/13 0801)   1  Interval resection of the tibial sesamoid with expected postoperative appearance  2   Additional postoperative changes as above appear stable        Workstation performed: [id number]         XR foot left 3+ views   Final Result by Alejandra Richards MD (01/09 9538)      Status post amputation 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            Workstation performed: [id number]         XR foot 3+ vw left   Final Result by Rocky Lynn MD (01/08 1430)      Suspicion of a small focus of erosion involving the distal plantar portion of the 1st proximal phalanx concerning for osteomyelitis            Workstation performed: [id number]         CT chest wo contrast   Final Result by Ligia Jimenez MD (01/07 1615)      Bilateral pleural effusions and diffuse intralobular septal thickening in keeping with CHF  The study was marked in [facility] for immediate notification  Workstation performed: [id number]         XR chest 2 views   Final Result by Ligia Jimenez MD (01/07 1330)      Mildly increased interstitial markings and a small left pleural effusion may indicate recurrent CHF/pulmonary edema  The study was marked in [facility] for immediate notification  Workstation performed: [id number]           Rest all reviewed    Portions of the record may have been created with voice recognition software  Occasional wrong word or "sound a like" substitutions may have occurred due to the inherent limitations of voice recognition software  Read the chart carefully and recognize, using context, where substitutions have occurred  If you have any questions, please contact the dictating provider

## 2020-01-30 ENCOUNTER — TELEPHONE (OUTPATIENT)
Dept: INFECTIOUS DISEASES | Facility: CLINIC | Age: 65
End: 2020-01-30

## 2020-01-30 NOTE — TELEPHONE ENCOUNTER
Attempt to call patient via message received to clarify med orders  Left message for a return phone call back to the office

## 2020-01-30 NOTE — TELEPHONE ENCOUNTER
Received a call back from this patient as well as [facility] to discuss the case  Per Roxane Solitario, he was on 2g in the hospital however when he was about to be discharged, it was noticed that the drug was affecting his kidney function and thus they made the call last minute to cut the dose in half  Pt should be on 1 gram Q8 of Cefazolin  Clarified with wife, Christina Adam

## 2020-01-31 ENCOUNTER — TELEPHONE (OUTPATIENT)
Dept: FAMILY MEDICINE CLINIC | Facility: CLINIC | Age: 65
End: 2020-01-31

## 2020-01-31 ENCOUNTER — TRANSITIONAL CARE MANAGEMENT (OUTPATIENT)
Dept: FAMILY MEDICINE CLINIC | Facility: CLINIC | Age: 65
End: 2020-01-31

## 2020-01-31 DIAGNOSIS — F41.9 ANXIETY: Primary | ICD-10-CM

## 2020-01-31 PROCEDURE — 87205 SMEAR GRAM STAIN: CPT | Performed by: PODIATRIST

## 2020-01-31 PROCEDURE — 87075 CULTR BACTERIA EXCEPT BLOOD: CPT | Performed by: PODIATRIST

## 2020-01-31 PROCEDURE — 87077 CULTURE AEROBIC IDENTIFY: CPT | Performed by: PODIATRIST

## 2020-01-31 PROCEDURE — 87186 SC STD MICRODIL/AGAR DIL: CPT | Performed by: PODIATRIST

## 2020-01-31 PROCEDURE — 87070 CULTURE OTHR SPECIMN AEROBIC: CPT | Performed by: PODIATRIST

## 2020-01-31 RX ORDER — LORAZEPAM 0.5 MG/1
0.5 TABLET ORAL EVERY 8 HOURS PRN
Qty: 60 TABLET | Refills: 2 | Status: SHIPPED | OUTPATIENT
Start: 2020-01-31 | End: 2020-05-19

## 2020-01-31 NOTE — TELEPHONE ENCOUNTER
Patients wife, Jamin Espinoza, sent a message through [employer] stating that Luciana Trinh was supposed to be D/C'd from the hospital with a script for Ativan, she believes   5 mg to take as needed for anxiety   She stated they never sent this into the pharmacy for him, she wants to know if you can write out a script for it and send to [facility] in [place]

## 2020-01-31 NOTE — TELEPHONE ENCOUNTER
Call patient's wife Eliceo Patient to find out if patient was receiving Ativan while in the hospital and if he has ever taken it before  I could not find it on the discharge summary or during hospitalization

## 2020-02-01 ENCOUNTER — LAB REQUISITION (OUTPATIENT)
Dept: LAB | Facility: HOSPITAL | Age: 65
End: 2020-02-01
Payer: MEDICARE

## 2020-02-01 DIAGNOSIS — E11.621 TYPE 2 DIABETES MELLITUS WITH FOOT ULCER (CODE) (HCC): ICD-10-CM

## 2020-02-03 ENCOUNTER — TRANSITIONAL CARE MANAGEMENT (OUTPATIENT)
Dept: FAMILY MEDICINE CLINIC | Facility: CLINIC | Age: 65
End: 2020-02-03

## 2020-02-03 ENCOUNTER — TELEPHONE (OUTPATIENT)
Dept: FAMILY MEDICINE CLINIC | Facility: CLINIC | Age: 65
End: 2020-02-03

## 2020-02-03 LAB — HBA1C MFR BLD HPLC: 6.6 %

## 2020-02-03 NOTE — TELEPHONE ENCOUNTER
----- Message from Nabor Barros sent at 1/31/2020  5:10 PM EST -----  Regarding: Prescription Question  Contact: [phone]  Dr Scarlet Go  One last but very important concern until Beto Arango sees you  If , as a patient, you would read Brennan's after visit summary you would not be able to begin to decide what life and food changes you need to make  For every body system and every health condition he has, there are many many recommendations and some even conflict with other things that he has been told  It is pages upon pages of information that no one could ever keep straight in their head  My concern is about a new medication that a floor Dr in the hospital, who saw Erwin Camarillo once or twice and I think he is the Dr who discharged Erwin Camarillo, prescribed for Erwin Camarillo  It is Ferrous Gluconate, 324 mg , taking it 2x a day  I dont like the instructions on how he needs to take this medication nor the possible side effects  Take it on an on an empty stomach 1 hr before or 2-3 hours after a meal  Being diabetic, Erwin Camarillo eats at different intervals than non diabetics  Avoid taking antibiotics within 2 hours before or after taking this  medication and Erwin Camarillo is on IV antibiotic infusions  This medication can cause upset stomachs, discoloration of the urine and feces, and do not lie down for at least 10 minutes after taking this medication  Erwin Camarillo has enough to deal with with his rehabbing here at home after being in bed literally for 3 weeks  Because of his amputation, he was not able to weight bare and for many of the 22 days he was in the hospital, he was very sick  He has lost all of his stamina however in home PT and OT should be starting next week  Can you please look at [facility] ([facility] medical record to see if this new medication is absolutely necessary   I guess this was prescribed because his iron was low but now that he is in better health and he has been and is taking a daily iron supplement, can his iron level increase without this medication whose dosing requirements are impossible for Erwin Camarillo and I to follow and I dont like the possible side effects  If not, do you have any other suggestions how to increase his iron level? I  am not going to add this medication to his current pills, not at this time, not until i hear back from you  Erwin Camarillo has made dramatic changes in every aspect of his life since that unfortunate  heart failure incident on 11/1/18 and right now he lives one day at a time, knowing that he may not have a very long lifespan  He takes all his meds faithfully, he has taken 75 % of his previous favorite foods out of his diet because of the high sodium content   I think he and I both feel that doing things in moderation is the best way to try to enjoy life right now and if this new medication is not going to greatly improve his life, let's try to find some other way for him to get the amount of iron he needs to be healthy   Thanks, Mauricio Carrillo

## 2020-02-03 NOTE — TELEPHONE ENCOUNTER
Left additional message for nora, as she called back and spoke with someone else, and this message was missed

## 2020-02-03 NOTE — TELEPHONE ENCOUNTER
Please call patient's wife and inform her I did read patient's discharge summary  Recommend patient attempt to take ferrous gluconate at least 1 tablet daily if not twice a day as prescribed  Try to follow instructions as well as possible  Patient is significantly anemic and therefore if he is able to take the iron supplement this should improve his energy level and overall weakness

## 2020-02-03 NOTE — TELEPHONE ENCOUNTER
Took call from [facility]  Made her aware of recommendations  She got a little frustrated and overwhelmed stating she doesn't know how she's going to give him this medication due to his other medications, side effects and instructions  Advised patient to bring in medication and instructions to office visit to discuss side effects with doctor fabrice

## 2020-02-04 ENCOUNTER — TELEPHONE (OUTPATIENT)
Dept: ENDOCRINOLOGY | Facility: CLINIC | Age: 65
End: 2020-02-04

## 2020-02-04 NOTE — TELEPHONE ENCOUNTER
----- Message from Verlyn Boas sent at 2/3/2020  6:37 PM EST -----  Regarding: Prescription Question  Contact: [phone]  Alec Dmrena  This is Qiana Meth wife  Raegan VO is 51  Tripp Fitch has an appt with you this  @ 10:45  Tripp Fitch was recently hospitalized at [facility] for 22 days as he had Sepsis and numerous other complications  No matter who I tell nor how times I try to get it changed, on [address] his dosage of Liddie Paradise is still listed as 16 units however you had changed it to 19 units as of 19  Just wanted to let you know that the change was never made  Thank you

## 2020-02-05 ENCOUNTER — OFFICE VISIT (OUTPATIENT)
Dept: ENDOCRINOLOGY | Facility: CLINIC | Age: 65
End: 2020-02-05
Payer: MEDICARE

## 2020-02-05 ENCOUNTER — DOCUMENTATION (OUTPATIENT)
Dept: INFECTIOUS DISEASES | Facility: CLINIC | Age: 65
End: 2020-02-05

## 2020-02-05 VITALS
DIASTOLIC BLOOD PRESSURE: 62 MMHG | BODY MASS INDEX: 30.77 KG/M2 | SYSTOLIC BLOOD PRESSURE: 140 MMHG | HEART RATE: 64 BPM | HEIGHT: 69 IN

## 2020-02-05 DIAGNOSIS — E11.8 TYPE 2 DIABETES MELLITUS WITH COMPLICATION (HCC): ICD-10-CM

## 2020-02-05 DIAGNOSIS — E11.22 TYPE 2 DIABETES MELLITUS WITH STAGE 3 CHRONIC KIDNEY DISEASE, WITH LONG-TERM CURRENT USE OF INSULIN (HCC): Primary | ICD-10-CM

## 2020-02-05 DIAGNOSIS — E78.2 MIXED HYPERLIPIDEMIA: ICD-10-CM

## 2020-02-05 DIAGNOSIS — Z79.4 TYPE 2 DIABETES MELLITUS WITH STAGE 3 CHRONIC KIDNEY DISEASE, WITH LONG-TERM CURRENT USE OF INSULIN (HCC): Primary | ICD-10-CM

## 2020-02-05 DIAGNOSIS — N18.30 TYPE 2 DIABETES MELLITUS WITH STAGE 3 CHRONIC KIDNEY DISEASE, WITH LONG-TERM CURRENT USE OF INSULIN (HCC): Primary | ICD-10-CM

## 2020-02-05 DIAGNOSIS — I10 ESSENTIAL HYPERTENSION: ICD-10-CM

## 2020-02-05 LAB — BACTERIA SPEC ANAEROBE CULT: NORMAL

## 2020-02-05 PROCEDURE — 1160F RVW MEDS BY RX/DR IN RCRD: CPT | Performed by: NURSE PRACTITIONER

## 2020-02-05 PROCEDURE — 3066F NEPHROPATHY DOC TX: CPT | Performed by: NURSE PRACTITIONER

## 2020-02-05 PROCEDURE — 2022F DILAT RTA XM EVC RTNOPTHY: CPT | Performed by: NURSE PRACTITIONER

## 2020-02-05 PROCEDURE — 1036F TOBACCO NON-USER: CPT | Performed by: NURSE PRACTITIONER

## 2020-02-05 PROCEDURE — 3077F SYST BP >= 140 MM HG: CPT | Performed by: NURSE PRACTITIONER

## 2020-02-05 PROCEDURE — 3044F HG A1C LEVEL LT 7.0%: CPT | Performed by: NURSE PRACTITIONER

## 2020-02-05 PROCEDURE — 1111F DSCHRG MED/CURRENT MED MERGE: CPT | Performed by: NURSE PRACTITIONER

## 2020-02-05 PROCEDURE — 3078F DIAST BP <80 MM HG: CPT | Performed by: NURSE PRACTITIONER

## 2020-02-05 PROCEDURE — 4040F PNEUMOC VAC/ADMIN/RCVD: CPT | Performed by: NURSE PRACTITIONER

## 2020-02-05 PROCEDURE — 99214 OFFICE O/P EST MOD 30 MIN: CPT | Performed by: NURSE PRACTITIONER

## 2020-02-05 NOTE — PROGRESS NOTES
Established Patient Progress Note      Chief Complaint   Patient presents with    Diabetes Type 2          History of Present Illness:   Aarti Coulter is a 75 y o  male with a history of HTN, HLD type 2 diabetes without long term use of insulin  Reports complications of neuropathy, CKD, and left 2nd toe amputation, and bilateral great toe partial amputation  Last A1C 6 6  Recent hospital admission for left hallux ulceration with osteomyelitis s/p amputation 1/08  Followed closely by podiatry  Was discharged to home 1/29  Currently receiving ancef through PICC till Friday  Has f/u with podiatry Thursday  Has visiting nurse and PT 2x per week  Denies recent severe hypoglycemic or severe hyperglycemic episodes  Denies any issues with his current regimen  Home glucose monitoring: are performed regularly    Home blood glucose readings:   Before breakfast: 120s  Before lunch: does not check   Before dinner: 140s  Bedtime: does not check     Current regimen: Xultophy 19 units and Metformin 500 mg BID   compliant all of the timedenies any side effects from current medications     Hypoglycemic episodes: No never   H/o of hypoglycemia causing hospitalization or Intervention such as glucagon injection or ambulance call No     Last Eye Exam: 3/12/19  Last Foot Exam: has f/u Thursday     Has hypertension: Taking Amlodipine, Losartan, and Metoprolol   Has hyperlipidemia: Taking Simvastatin and Lovaza       Patient Active Problem List   Diagnosis    Bilateral leg edema    Diabetes mellitus with neuropathy ([address] )    Diabetic foot ulcer [facility]    DM type 2, not at goal [facility])    Easy bruising    Eczema    Erectile dysfunction of non-organic origin    Gout    Hyperlipidemia    Uremia    Osteoarthritis    Peripheral arterial disease (HCC)    PVCs (premature ventricular contractions)    Rhinitis    Systolic murmur    Vertigo    Complete heart block (HCC)    Metabolic acidosis    Other hyperlipidemia    [facility] sepsis [facility]    PAF (paroxysmal atrial fibrillation) ([facility]    Acute respiratory failure with hypoxia ([facility])    Persistent proteinuria    Volume overload    Urinary retention    NICOLASA (obstructive sleep apnea)    Acute diastolic (congestive) heart failure ([facility])    Type 2 diabetes mellitus with chronic kidney disease, with long-term current use of insulin ([facility])    Essential hypertension    Acute renal failure superimposed on stage 3 chronic kidney disease ([facility])    Nonrheumatic aortic valve stenosis    Hypoglycemia    Anemia    Fever    Mitral valve stenosis    Abnormal CT of the chest    Acute pulmonary edema ([facility]    Chronic diastolic (congestive) heart failure ([facility])    Left renal artery stenosis ([facility])    S/P amputation of lesser toe, left ([facility])    S/P amputation of lesser toe, right ([facility])    Elevated troponin I level    Staphylococcus aureus bacteremia    Type 2 diabetes mellitus with diabetic neuropathy, without long-term current use of insulin ([facility])    Iron deficiency anemia    Anxiety      Past Medical History:   Diagnosis Date    Arthritis     OA    Bruise of both arms     forearms and both hands    Bruises easily     CHF (congestive heart failure) [facility]     Diabetes mellitus ([facility] )     Diabetic foot ulcer ([facility] )     Eczema     Erectile dysfunction     Gout     Hyperlipidemia     Hypertension     Murmur     Nephropathy     Osteoarthritis     PAD (peripheral artery disease) ([facility])     Seasonal allergies     Toe infection     bilat great toes    Vertigo     Walks frequently     Wears dentures     upper    Wears glasses       Past Surgical History:   Procedure Laterality Date    CARDIAC PACEMAKER PLACEMENT      CARPAL TUNNEL RELEASE Left     CARPAL TUNNEL RELEASE Right     CARPAL TUNNEL RELEASE      INCISION AND DRAINAGE OF WOUND Left 1/12/2020    Procedure: INCISION AND DRAINAGE (I&D) EXTREMITY AND REMOVAL OF SESMOID BONE;  Surgeon: Moise Cordova DPM;  [facility]; Service: Podiatry    IR [facility] REPO  1/14/2020    KNEE ARTHROSCOPY Left     KNEE ARTHROSCOPY Right     KNEE SURGERY      AK AMPUTATION TOE,I-P JT Bilateral 5/2/2017    Procedure: PARTIAL AMPUTATION RIGHT AND LEFT HALLUX ;  Surgeon: Zachary Ramos DPM;  [facility];  Service: Podiatry    AK AMPUTATION TOE,MT-P JT Left 7/25/2017    Procedure: 2ND TOE AMPUTATION;  Surgeon: Zachary Ramos DPM;  [facility];  Service: Podiatry    SHOULDER ARTHROSCOPY Left     with screws,RTC    SHOULDER SURGERY      TOE AMPUTATION Left 1/8/2020    Procedure: Francisco Rust;  Surgeon: Lanette Ramos DPM;  [facility];  Service: Podiatry    VASECTOMY        Family History   Problem Relation Age of Onset    Heart disease Father     No Known Problems Mother     Hypertension Father     Pulmonary embolism Father      Social History     Tobacco Use    Smoking status: Former Smoker     Packs/day: 1 00     Years: 30 00     Pack years: 30 00     Types: Cigarettes    Smokeless tobacco: Never Used    Tobacco comment: quit 10 years ago   Substance Use Topics    Alcohol use: Never     Frequency: Never     Allergies   Allergen Reactions    Invokana [Canagliflozin]     Lamisil [Terbinafine] Rash    Lamisil [Terbinafine] Blisters     Wife states " His skin peeled from head to toe"    Other Swelling     Pomegranate - facial swelling, no swelling of tongue, esophagus  Adhesive tape        Latex Rash         Current Outpatient Medications:     allopurinol (ZYLOPRIM) 300 mg tablet, TAKE 1 TABLET (300 MG TOTAL) BY MOUTH EVERY MORNING, Disp: 90 tablet, Rfl: 2    amLODIPine (NORVASC) 10 mg tablet, TAKE 1 TABLET BY MOUTH EVERY DAY, Disp: 90 tablet, Rfl: 3    apixaban (ELIQUIS) 5 mg, Take 1 tablet (5 mg total) by mouth every 12 (twelve) hours, Disp: 60 tablet, Rfl: 11    betamethasone valerate (VALISONE) 0 1 % cream, as needed , Disp: , Rfl: 3    ceFAZolin (ANCEF) 2000 mg IVPB, Infuse 2,000 mg into a venous catheter every 8 (eight) hours for 23 days, Disp: 3450 mL, Rfl: 0    Cholecalciferol (VITAMIN D-3) 1000 units CAPS, Take 1 capsule by mouth every morning  , Disp: , Rfl:     Dupilumab, Asthma, (DUPIXENT SC), Inject under the skin , Disp: , Rfl:     ferrous gluconate (FERGON) 324 mg tablet, Take 1 tablet (324 mg total) by mouth 2 (two) times a day before meals, Disp: 30 tablet, Rfl: 0    furosemide (LASIX) 40 mg tablet, Take 1 tablet (40 mg total) by mouth 2 (two) times a day, Disp: , Rfl:     glucagon (GLUCAGON EMERGENCY) 1 MG injection, Inject 1 mg under the skin once as needed for low blood sugar for up to 1 dose, Disp: 1 kit, Rfl: 2    glucose blood (ONE TOUCH ULTRA TEST) test strip, 1 each by Other route 3 (three) times a day Use to test blood sugar, Disp: 130 each, Rfl: 6    hydrOXYzine HCL (ATARAX) 10 mg tablet, Take 10 mg by mouth every 6 (six) hours as needed  , Disp: , Rfl:     Insulin Degludec-Liraglutide (Insulin Degludec-Liraglutide) 100 units-3 6 mg/mL injection pen, Inject 19 Units under the skin daily, Disp: , Rfl:     LORazepam (ATIVAN) 0 5 mg tablet, Take 1 tablet (0 5 mg total) by mouth every 8 (eight) hours as needed for anxiety, Disp: 60 tablet, Rfl: 2    losartan (COZAAR) 50 mg tablet, Take 0 5 tablets (25 mg total) by mouth daily, Disp: 180 tablet, Rfl: 3    metFORMIN (GLUCOPHAGE) 500 mg tablet, Take 1 tablet (500 mg total) by mouth 2 (two) times a day with meals, Disp: 360 tablet, Rfl: 3    metoprolol tartrate (LOPRESSOR) 50 mg tablet, Take 1 tablet (50 mg total) by mouth every 12 (twelve) hours, Disp: 180 tablet, Rfl: 3    Misc   Devices (FREE SPIRIT KNEE/LEG WALKER) MISC, by Does not apply route 3 (three) times a day, Disp: 1 each, Rfl: 0    Multiple Vitamin (MULTIVITAMIN) tablet, Take 1 tablet by mouth daily IN AM, Disp: , Rfl:     omega-3-acid ethyl esters (LOVAZA) 1 g capsule, Take 2 capsules (2 g total) by mouth 2 (two) times a day, Disp: 360 capsule, Rfl: 3    ONE TOUCH ULTRA TEST test strip, USE TO TEST BLOOD SUGAR 3 TIMES A DAY, Disp: 100 each, Rfl: 3    simvastatin (ZOCOR) 20 mg tablet, Take 1 tablet (20 mg total) by mouth daily at bedtime, Disp: 90 tablet, Rfl: 3    tamsulosin (FLOMAX) 0 4 mg, Take 0 4 mg by mouth daily with dinner, Disp: , Rfl: 3    econazole nitrate 1 % cream, Apply 1 application topically as needed To affected area, Disp: , Rfl: 0    metolazone (ZAROXOLYN) 5 mg tablet, Take 1 tablet (5 mg total) by mouth see administration instructions One tablet every 6 days prn and as directed by physician, Disp: 5 tablet, Rfl: 5    Review of Systems   Constitutional: Negative for activity change, appetite change and fatigue  HENT: Negative for sore throat, trouble swallowing and voice change  Eyes: Negative for visual disturbance  Respiratory: Negative for choking, chest tightness and shortness of breath  Cardiovascular: Negative for chest pain, palpitations and leg swelling  Gastrointestinal: Negative for abdominal pain, constipation and diarrhea  Endocrine: Negative for cold intolerance, heat intolerance, polydipsia, polyphagia and polyuria  Genitourinary: Negative for frequency  Musculoskeletal: Negative for arthralgias and myalgias  Skin: Negative for rash  Neurological: Negative for dizziness and syncope  Hematological: Negative for adenopathy  Psychiatric/Behavioral: Negative for sleep disturbance  All other systems reviewed and are negative  Physical Exam:  Body mass index is 30 77 kg/m²  /62   Pulse 64   Ht 5' 9" (1 753 m)   BMI 30 77 kg/m²    Wt Readings from Last 3 Encounters:   01/29/20 94 5 kg (208 lb 5 4 oz)   12/13/19 96 2 kg (212 lb)   12/03/19 97 5 kg (215 lb)       Physical Exam   Constitutional: He is oriented to person, place, and time  He appears well-developed and well-nourished  No distress  HENT:   Head: Normocephalic and atraumatic     Mouth/Throat: Oropharynx is clear and moist    Eyes: Pupils are equal, round, and reactive to light  Conjunctivae and EOM are normal    Neck: Normal range of motion  Neck supple  No thyromegaly present  Cardiovascular: Normal rate, regular rhythm and normal heart sounds  No murmur heard  Pulmonary/Chest: Effort normal and breath sounds normal  No respiratory distress  He has no wheezes  He has no rales  Abdominal: Soft  Bowel sounds are normal  He exhibits no distension  There is no tenderness  Musculoskeletal: Normal range of motion  He exhibits no edema  Lymphadenopathy:     He has no cervical adenopathy  Neurological: He is alert and oriented to person, place, and time  Skin: Skin is warm and dry  Psychiatric: He has a normal mood and affect  Vitals reviewed  Labs:     Lab Results   Component Value Date    HGBA1C 6 6 02/03/2020       Lab Results   Component Value Date     (L) 04/10/2017    SODIUM 141 01/29/2020    K 3 7 01/29/2020     01/29/2020    CO2 29 01/29/2020    AGAP 9 01/29/2020     (H) 01/29/2020    CREATININE 1 88 (H) 01/29/2020    GLUC 67 01/29/2020    GLUF 120 (H) 09/19/2019    CALCIUM 8 5 01/29/2020    AST 24 01/07/2020    ALT 24 01/07/2020    ALKPHOS 83 01/07/2020    PROT 6 9 04/10/2017    TP 7 2 01/07/2020    BILITOT 0 7 04/10/2017    TBILI 0 93 01/07/2020    EGFR 36 01/29/2020         Lab Results   Component Value Date    CHOL 161 12/12/2015    HDL 36 (L) 09/19/2019    TRIG 96 09/19/2019       Lab Results   Component Value Date    YAK1JCPCQHPL 0 504 01/19/2020    TCC8LFSXLUOR 3 430 09/19/2019    MJO0IRHEWAVG 4 170 (H) 06/12/2019     Lab Results   Component Value Date    FREET4 0 88 09/19/2019       Impression & Plan:    Problem List Items Addressed This Visit        Endocrine    Type 2 diabetes mellitus with chronic kidney disease, with long-term current use of insulin ([address] ) - Primary     Well controlled with no episodes of hypo or hyperglycemia  Continue current regimen  Focus on dietary and lifestyle modifications  Relevant Medications    metFORMIN (GLUCOPHAGE) 500 mg tablet    Other Relevant Orders    Hemoglobin A1C    Comprehensive metabolic panel    Lipid Panel with Direct LDL reflex       Cardiovascular and Mediastinum    Essential hypertension     BP stable, continue current regimen          Relevant Orders    Comprehensive metabolic panel       Other    Hyperlipidemia     Stable, continue statin          Relevant Orders    Lipid Panel with Direct LDL reflex      Other Visit Diagnoses     Type 2 diabetes mellitus with complication (HCC)        Relevant Medications    metFORMIN (GLUCOPHAGE) 500 mg tablet          Orders Placed This Encounter   Procedures    Hemoglobin A1C     Standing Status:   Future     Standing Expiration Date:   2/5/2021    Comprehensive metabolic panel     This is a patient instruction: Patient fasting for 8 hours or longer recommended  Standing Status:   Future     Standing Expiration Date:   2/5/2021    Lipid Panel with Direct LDL reflex     This is a patient instruction: This test requires patient fasting for 10-12 hours or longer  Drinking of black coffee or black tea is acceptable  Standing Status:   Future     Standing Expiration Date:   2/5/2021         Discussed with the patient and all questioned fully answered  He will call me if any problems arise  Follow-up appointment in 3-4 months       Counseled patient on diagnostic results, prognosis, risk and benefit of treatment options, instruction for management, importance of treatment compliance, Risk  factor reduction and impressions      Chloe [address]

## 2020-02-05 NOTE — ASSESSMENT & PLAN NOTE
Well controlled with no episodes of hypo or hyperglycemia  Continue current regimen  Focus on dietary and lifestyle modifications

## 2020-02-06 LAB
BACTERIA WND AEROBE CULT: ABNORMAL
GRAM STN SPEC: ABNORMAL
GRAM STN SPEC: ABNORMAL

## 2020-02-07 ENCOUNTER — HOSPITAL ENCOUNTER (INPATIENT)
Facility: HOSPITAL | Age: 65
LOS: 6 days | Discharge: HOME HEALTH | DRG: 907 | End: 2020-02-13
Attending: PODIATRIST | Admitting: PODIATRIST
Payer: MEDICARE

## 2020-02-07 ENCOUNTER — APPOINTMENT (INPATIENT)
Dept: RADIOLOGY | Facility: HOSPITAL | Age: 65
DRG: 907 | End: 2020-02-07
Payer: MEDICARE

## 2020-02-07 ENCOUNTER — TELEPHONE (OUTPATIENT)
Dept: FAMILY MEDICINE CLINIC | Facility: CLINIC | Age: 65
End: 2020-02-07

## 2020-02-07 ENCOUNTER — OFFICE VISIT (OUTPATIENT)
Dept: NEPHROLOGY | Facility: CLINIC | Age: 65
End: 2020-02-07
Payer: MEDICARE

## 2020-02-07 VITALS
BODY MASS INDEX: 30.24 KG/M2 | WEIGHT: 211.2 LBS | SYSTOLIC BLOOD PRESSURE: 136 MMHG | DIASTOLIC BLOOD PRESSURE: 57 MMHG | HEIGHT: 70 IN | HEART RATE: 64 BPM

## 2020-02-07 DIAGNOSIS — N18.30 STAGE 3 CHRONIC KIDNEY DISEASE (HCC): Primary | ICD-10-CM

## 2020-02-07 DIAGNOSIS — E11.40 TYPE 2 DIABETES MELLITUS WITH DIABETIC NEUROPATHY, WITHOUT LONG-TERM CURRENT USE OF INSULIN (HCC): ICD-10-CM

## 2020-02-07 DIAGNOSIS — I10 ESSENTIAL HYPERTENSION: ICD-10-CM

## 2020-02-07 DIAGNOSIS — I50.32 CHRONIC DIASTOLIC (CONGESTIVE) HEART FAILURE (HCC): ICD-10-CM

## 2020-02-07 DIAGNOSIS — E78.5 HYPERLIPIDEMIA, UNSPECIFIED HYPERLIPIDEMIA TYPE: ICD-10-CM

## 2020-02-07 DIAGNOSIS — R60.0 BILATERAL LEG EDEMA: ICD-10-CM

## 2020-02-07 DIAGNOSIS — J96.01 ACUTE RESPIRATORY FAILURE WITH HYPOXIA (HCC): ICD-10-CM

## 2020-02-07 DIAGNOSIS — N18.3 ACUTE RENAL FAILURE SUPERIMPOSED ON STAGE 3 CHRONIC KIDNEY DISEASE, UNSPECIFIED ACUTE RENAL FAILURE TYPE: ICD-10-CM

## 2020-02-07 DIAGNOSIS — Z98.890 POST-OPERATIVE STATE: ICD-10-CM

## 2020-02-07 DIAGNOSIS — N17.9 ACUTE RENAL FAILURE SUPERIMPOSED ON STAGE 3 CHRONIC KIDNEY DISEASE, UNSPECIFIED ACUTE RENAL FAILURE TYPE: ICD-10-CM

## 2020-02-07 DIAGNOSIS — I50.31 ACUTE DIASTOLIC (CONGESTIVE) HEART FAILURE (HCC): ICD-10-CM

## 2020-02-07 DIAGNOSIS — M86.172 OSTEOMYELITIS OF FOOT, LEFT, ACUTE (HCC): Primary | ICD-10-CM

## 2020-02-07 DIAGNOSIS — I48.0 PAF (PAROXYSMAL ATRIAL FIBRILLATION) (HCC): ICD-10-CM

## 2020-02-07 LAB
ALBUMIN SERPL BCP-MCNC: 2.3 G/DL (ref 3.5–5)
ALP SERPL-CCNC: 88 U/L (ref 46–116)
ALT SERPL W P-5'-P-CCNC: 8 U/L (ref 12–78)
ANION GAP SERPL CALCULATED.3IONS-SCNC: 9 MMOL/L (ref 4–13)
AST SERPL W P-5'-P-CCNC: 14 U/L (ref 5–45)
BASOPHILS # BLD AUTO: 0.09 THOUSANDS/ΜL (ref 0–0.1)
BASOPHILS NFR BLD AUTO: 1 % (ref 0–1)
BILIRUB SERPL-MCNC: 0.33 MG/DL (ref 0.2–1)
BUN SERPL-MCNC: 72 MG/DL (ref 5–25)
CALCIUM SERPL-MCNC: 8.6 MG/DL (ref 8.3–10.1)
CHLORIDE SERPL-SCNC: 102 MMOL/L (ref 100–108)
CO2 SERPL-SCNC: 23 MMOL/L (ref 21–32)
CREAT SERPL-MCNC: 2.05 MG/DL (ref 0.6–1.3)
EOSINOPHIL # BLD AUTO: 1.72 THOUSAND/ΜL (ref 0–0.61)
EOSINOPHIL NFR BLD AUTO: 14 % (ref 0–6)
ERYTHROCYTE [DISTWIDTH] IN BLOOD BY AUTOMATED COUNT: 14.6 % (ref 11.6–15.1)
GFR SERPL CREATININE-BSD FRML MDRD: 33 ML/MIN/1.73SQ M
GLUCOSE SERPL-MCNC: 101 MG/DL (ref 65–140)
GLUCOSE SERPL-MCNC: 107 MG/DL (ref 65–140)
GLUCOSE SERPL-MCNC: 135 MG/DL (ref 65–140)
GLUCOSE SERPL-MCNC: 83 MG/DL (ref 65–140)
HCT VFR BLD AUTO: 26.6 % (ref 36.5–49.3)
HGB BLD-MCNC: 8.3 G/DL (ref 12–17)
IMM GRANULOCYTES # BLD AUTO: 0.05 THOUSAND/UL (ref 0–0.2)
IMM GRANULOCYTES NFR BLD AUTO: 0 % (ref 0–2)
LYMPHOCYTES # BLD AUTO: 1.08 THOUSANDS/ΜL (ref 0.6–4.47)
LYMPHOCYTES NFR BLD AUTO: 9 % (ref 14–44)
MCH RBC QN AUTO: 28.6 PG (ref 26.8–34.3)
MCHC RBC AUTO-ENTMCNC: 31.2 G/DL (ref 31.4–37.4)
MCV RBC AUTO: 92 FL (ref 82–98)
MONOCYTES # BLD AUTO: 1.04 THOUSAND/ΜL (ref 0.17–1.22)
MONOCYTES NFR BLD AUTO: 8 % (ref 4–12)
NEUTROPHILS # BLD AUTO: 8.4 THOUSANDS/ΜL (ref 1.85–7.62)
NEUTS SEG NFR BLD AUTO: 68 % (ref 43–75)
NRBC BLD AUTO-RTO: 0 /100 WBCS
PLATELET # BLD AUTO: 325 THOUSANDS/UL (ref 149–390)
PMV BLD AUTO: 9.7 FL (ref 8.9–12.7)
POTASSIUM SERPL-SCNC: 5.3 MMOL/L (ref 3.5–5.3)
PROT SERPL-MCNC: 7.1 G/DL (ref 6.4–8.2)
RBC # BLD AUTO: 2.9 MILLION/UL (ref 3.88–5.62)
SODIUM SERPL-SCNC: 134 MMOL/L (ref 136–145)
WBC # BLD AUTO: 12.38 THOUSAND/UL (ref 4.31–10.16)

## 2020-02-07 PROCEDURE — 99214 OFFICE O/P EST MOD 30 MIN: CPT | Performed by: INTERNAL MEDICINE

## 2020-02-07 PROCEDURE — 0Y6Q0Z1 DETACHMENT AT LEFT 1ST TOE, HIGH, OPEN APPROACH: ICD-10-PCS | Performed by: PODIATRIST

## 2020-02-07 PROCEDURE — 1160F RVW MEDS BY RX/DR IN RCRD: CPT | Performed by: INTERNAL MEDICINE

## 2020-02-07 PROCEDURE — 4040F PNEUMOC VAC/ADMIN/RCVD: CPT | Performed by: INTERNAL MEDICINE

## 2020-02-07 PROCEDURE — 82948 REAGENT STRIP/BLOOD GLUCOSE: CPT

## 2020-02-07 PROCEDURE — 2022F DILAT RTA XM EVC RTNOPTHY: CPT | Performed by: INTERNAL MEDICINE

## 2020-02-07 PROCEDURE — 3075F SYST BP GE 130 - 139MM HG: CPT | Performed by: INTERNAL MEDICINE

## 2020-02-07 PROCEDURE — 3044F HG A1C LEVEL LT 7.0%: CPT | Performed by: INTERNAL MEDICINE

## 2020-02-07 PROCEDURE — 85025 COMPLETE CBC W/AUTO DIFF WBC: CPT | Performed by: STUDENT IN AN ORGANIZED HEALTH CARE EDUCATION/TRAINING PROGRAM

## 2020-02-07 PROCEDURE — 80053 COMPREHEN METABOLIC PANEL: CPT | Performed by: STUDENT IN AN ORGANIZED HEALTH CARE EDUCATION/TRAINING PROGRAM

## 2020-02-07 PROCEDURE — 73630 X-RAY EXAM OF FOOT: CPT

## 2020-02-07 PROCEDURE — 1111F DSCHRG MED/CURRENT MED MERGE: CPT | Performed by: INTERNAL MEDICINE

## 2020-02-07 PROCEDURE — 1036F TOBACCO NON-USER: CPT | Performed by: INTERNAL MEDICINE

## 2020-02-07 PROCEDURE — 94660 CPAP INITIATION&MGMT: CPT

## 2020-02-07 PROCEDURE — 3066F NEPHROPATHY DOC TX: CPT | Performed by: INTERNAL MEDICINE

## 2020-02-07 PROCEDURE — 99223 1ST HOSP IP/OBS HIGH 75: CPT | Performed by: INTERNAL MEDICINE

## 2020-02-07 PROCEDURE — 3078F DIAST BP <80 MM HG: CPT | Performed by: INTERNAL MEDICINE

## 2020-02-07 PROCEDURE — 3008F BODY MASS INDEX DOCD: CPT | Performed by: INTERNAL MEDICINE

## 2020-02-07 RX ORDER — FUROSEMIDE 40 MG/1
40 TABLET ORAL
Status: DISCONTINUED | OUTPATIENT
Start: 2020-02-08 | End: 2020-02-10

## 2020-02-07 RX ORDER — AMLODIPINE BESYLATE 10 MG/1
10 TABLET ORAL DAILY
Status: DISCONTINUED | OUTPATIENT
Start: 2020-02-08 | End: 2020-02-08

## 2020-02-07 RX ORDER — PRAVASTATIN SODIUM 20 MG
20 TABLET ORAL
Status: DISCONTINUED | OUTPATIENT
Start: 2020-02-07 | End: 2020-02-13 | Stop reason: HOSPADM

## 2020-02-07 RX ORDER — METOPROLOL TARTRATE 50 MG/1
50 TABLET, FILM COATED ORAL EVERY 12 HOURS SCHEDULED
Status: DISCONTINUED | OUTPATIENT
Start: 2020-02-07 | End: 2020-02-13 | Stop reason: HOSPADM

## 2020-02-07 RX ORDER — TAMSULOSIN HYDROCHLORIDE 0.4 MG/1
0.4 CAPSULE ORAL
Status: DISCONTINUED | OUTPATIENT
Start: 2020-02-07 | End: 2020-02-13 | Stop reason: HOSPADM

## 2020-02-07 RX ORDER — ASCORBIC ACID, VITAMIN A PALMITATE, CHOLECALCIFEROL, THIAMINE HYDROCHLORIDE, RIBOFLAVIN-5 PHOSPHATE SODIUM, PYRIDOXINE HYDROCHLORIDE, NIACINAMIDE, DEXPANTHENOL, ALPHA-TOCOPHEROL ACETATE, VITAMIN K1, FOLIC ACID, BIOTIN, CYANOCOBALAMIN 200; 3300; 200; 6; 3.6; 6; 40; 15; 10; 150; 600; 60; 5 MG/10ML; [IU]/10ML; [IU]/10ML; MG/10ML; MG/10ML; MG/10ML; MG/10ML; MG/10ML; [IU]/10ML; UG/10ML; UG/10ML; UG/10ML; UG/10ML
10 INJECTION, SOLUTION INTRAVENOUS ONCE
Status: DISCONTINUED | OUTPATIENT
Start: 2020-02-07 | End: 2020-02-07

## 2020-02-07 RX ORDER — HEPARIN SODIUM 5000 [USP'U]/ML
5000 INJECTION, SOLUTION INTRAVENOUS; SUBCUTANEOUS EVERY 8 HOURS SCHEDULED
Status: DISCONTINUED | OUTPATIENT
Start: 2020-02-07 | End: 2020-02-11

## 2020-02-07 RX ORDER — METOLAZONE 5 MG/1
5 TABLET ORAL
Status: DISCONTINUED | OUTPATIENT
Start: 2020-02-14 | End: 2020-02-08

## 2020-02-07 RX ORDER — LOSARTAN POTASSIUM 25 MG/1
25 TABLET ORAL DAILY
Status: DISCONTINUED | OUTPATIENT
Start: 2020-02-08 | End: 2020-02-08

## 2020-02-07 RX ORDER — OXYCODONE HYDROCHLORIDE 5 MG/1
5 TABLET ORAL EVERY 6 HOURS PRN
Status: DISCONTINUED | OUTPATIENT
Start: 2020-02-07 | End: 2020-02-13 | Stop reason: HOSPADM

## 2020-02-07 RX ORDER — CHLORAL HYDRATE 500 MG
1000 CAPSULE ORAL 2 TIMES DAILY
Status: DISCONTINUED | OUTPATIENT
Start: 2020-02-07 | End: 2020-02-13 | Stop reason: HOSPADM

## 2020-02-07 RX ORDER — ACETAMINOPHEN 325 MG/1
650 TABLET ORAL EVERY 6 HOURS PRN
Status: DISCONTINUED | OUTPATIENT
Start: 2020-02-07 | End: 2020-02-13 | Stop reason: HOSPADM

## 2020-02-07 RX ORDER — LORAZEPAM 0.5 MG/1
0.5 TABLET ORAL EVERY 8 HOURS PRN
Status: DISCONTINUED | OUTPATIENT
Start: 2020-02-07 | End: 2020-02-13 | Stop reason: HOSPADM

## 2020-02-07 RX ORDER — DOXYCYCLINE HYCLATE 50 MG/1
324 CAPSULE, GELATIN COATED ORAL
Status: DISCONTINUED | OUTPATIENT
Start: 2020-02-07 | End: 2020-02-13 | Stop reason: HOSPADM

## 2020-02-07 RX ORDER — ALLOPURINOL 100 MG/1
300 TABLET ORAL EVERY MORNING
Status: DISCONTINUED | OUTPATIENT
Start: 2020-02-08 | End: 2020-02-13 | Stop reason: HOSPADM

## 2020-02-07 RX ADMIN — PRAVASTATIN SODIUM 20 MG: 20 TABLET ORAL at 17:20

## 2020-02-07 RX ADMIN — Medication 1000 MG: at 17:20

## 2020-02-07 RX ADMIN — CEFEPIME HYDROCHLORIDE 1000 MG: 1 INJECTION, POWDER, FOR SOLUTION INTRAMUSCULAR; INTRAVENOUS at 18:20

## 2020-02-07 RX ADMIN — OXYCODONE HYDROCHLORIDE 5 MG: 5 TABLET ORAL at 18:24

## 2020-02-07 RX ADMIN — FERROUS GLUCONATE 324 MG: 324 TABLET ORAL at 17:20

## 2020-02-07 RX ADMIN — HEPARIN SODIUM 5000 UNITS: 5000 INJECTION INTRAVENOUS; SUBCUTANEOUS at 19:54

## 2020-02-07 RX ADMIN — TAMSULOSIN HYDROCHLORIDE 0.4 MG: 0.4 CAPSULE ORAL at 17:20

## 2020-02-07 RX ADMIN — METOPROLOL TARTRATE 50 MG: 50 TABLET, FILM COATED ORAL at 21:27

## 2020-02-07 RX ADMIN — Medication 1 TABLET: at 19:54

## 2020-02-07 NOTE — PROGRESS NOTES
OFFICE follow-up- Nephrology   oJb Le 75 y o  male MRN: [id number]    Encounter: [id number]          ASSESSMENT and PLAN:  Ariana Ashton was seen today for an initial consultation    He is 75years old with a past medical history of type 2 diabetes mellitus times 20 years, hypertension that is uncontrolled, gout with no recent attack, hyperlipidemia who presents for evaluation of proteinuria    Diagnoses and all orders for this visit:    Essential (primary) hypertension  - blood pressures at home are averaging in the 121-939 systolic range  - given his age and comorbidities his goal blood pressure should be 125 to 135 over 70-80  - he is currently on amlodipine 10 mg daily, metoprolol 50 mg twice daily, losartan was decreased to 25 mg daily and chlorthalidone was discontinued while in the hospital  -secondary workup included a repeat renal artery ultrasound which does show a left renal artery stenosis  - his echocardiogram was reviewed as well he should have further follow-up with cardiology to monitor his valvular heart disease- he does have LVH so blood pressure control is very important and this was discussed in detail with him in past    Persistent proteinuria  - in the setting of prolonged diabetes  -he does have nephrotic range proteinuria serologic workup is negative and his creatinine is stable  -we will continue to monitor with ARB     Nephropathy  -serologies negative will monitor at this point    Gout  - on allopurinol 300 mg daily  - has been gout free, will monitor and continue    Bilateral leg edema  - left greater than right,  - He does take furosemide as needed however have asked him to adhere to a low-sodium diet and try compression stockings or Ace wrap, he denies any pain any warmth in the area on his right leg    Left renal artery stenosis  -renal artery Doppler showed 60% stenosis  -can be indication and cause of flash pulmonary edema  -mitral valve disease also present    Left foot osteomyelitis, peripheral vascular disease  -being admitted to the hospital  -getting WBC scan  -potential surgery next week    Renal function is stable, would be happy to see patient in the hospital for CKD risk reduction Follow-up in 6 months with repeat blood work    HPI:  Willard Brian is a 75 y o male who was referred by Rory Amin for evaluation of  Initial evaluation    He is 75years old and has a history of type 2 diabetes mellitus currently on oral medications stage IIIA to 2 chronic kidney disease mitral valve prolapse,    He recently had a hospitalization with lower extremity foot wound and now concern for osteomyelitis he is going back to the hot ill today for potential surgery next week, he has been on antibiotics for over a month he denies any chest pain or shortness of Breath fevers or chills his labs were reviewed from February 3rd and his creatinine remained stable denies any foamy or bloody urine currently his blood pressures have remained stable and he offers no acute complaints      ROS: All the systems were reviewed and were negative except as documented on the HPI  Allergies: Leodis Sanes; Lamisil [terbinafine]; Lamisil [terbinafine];  Other; and Latex    Medications:   Current Outpatient Medications:     allopurinol (ZYLOPRIM) 300 mg tablet, TAKE 1 TABLET (300 MG TOTAL) BY MOUTH EVERY MORNING, Disp: 90 tablet, Rfl: 2    amLODIPine (NORVASC) 10 mg tablet, TAKE 1 TABLET BY MOUTH EVERY DAY, Disp: 90 tablet, Rfl: 3    apixaban (ELIQUIS) 5 mg, Take 1 tablet (5 mg total) by mouth every 12 (twelve) hours, Disp: 60 tablet, Rfl: 11    Cholecalciferol (VITAMIN D-3) 1000 units CAPS, Take 1 capsule by mouth every morning  , Disp: , Rfl:     ferrous gluconate (FERGON) 324 mg tablet, Take 1 tablet (324 mg total) by mouth 2 (two) times a day before meals, Disp: 30 tablet, Rfl: 0    furosemide (LASIX) 40 mg tablet, Take 1 tablet (40 mg total) by mouth 2 (two) times a day, Disp: , Rfl:    glucagon (GLUCAGON EMERGENCY) 1 MG injection, Inject 1 mg under the skin once as needed for low blood sugar for up to 1 dose, Disp: 1 kit, Rfl: 2    glucose blood (ONE TOUCH ULTRA TEST) test strip, 1 each by Other route 3 (three) times a day Use to test blood sugar, Disp: 130 each, Rfl: 6    hydrOXYzine HCL (ATARAX) 10 mg tablet, Take 10 mg by mouth every 6 (six) hours as needed  , Disp: , Rfl:     Insulin Degludec-Liraglutide (Insulin Degludec-Liraglutide) 100 units-3 6 mg/mL injection pen, Inject 19 Units under the skin daily, Disp: , Rfl:     LORazepam (ATIVAN) 0 5 mg tablet, Take 1 tablet (0 5 mg total) by mouth every 8 (eight) hours as needed for anxiety, Disp: 60 tablet, Rfl: 2    losartan (COZAAR) 50 mg tablet, Take 0 5 tablets (25 mg total) by mouth daily, Disp: 180 tablet, Rfl: 3    metFORMIN (GLUCOPHAGE) 500 mg tablet, Take 1 tablet (500 mg total) by mouth 2 (two) times a day with meals, Disp: 360 tablet, Rfl: 3    metolazone (ZAROXOLYN) 5 mg tablet, Take 1 tablet (5 mg total) by mouth see administration instructions One tablet every 6 days prn and as directed by physician, Disp: 5 tablet, Rfl: 5    metoprolol tartrate (LOPRESSOR) 50 mg tablet, Take 1 tablet (50 mg total) by mouth every 12 (twelve) hours, Disp: 180 tablet, Rfl: 3    Misc   Devices (FREE SPIRIT KNEE/LEG WALKER) MISC, by Does not apply route 3 (three) times a day, Disp: 1 each, Rfl: 0    Multiple Vitamin (MULTIVITAMIN) tablet, Take 1 tablet by mouth daily IN AM, Disp: , Rfl:     omega-3-acid ethyl esters (LOVAZA) 1 g capsule, Take 2 capsules (2 g total) by mouth 2 (two) times a day, Disp: 360 capsule, Rfl: 3    ONE TOUCH ULTRA TEST test strip, USE TO TEST BLOOD SUGAR 3 TIMES A DAY, Disp: 100 each, Rfl: 3    simvastatin (ZOCOR) 20 mg tablet, Take 1 tablet (20 mg total) by mouth daily at bedtime, Disp: 90 tablet, Rfl: 3    SULFAMETHOXAZOLE-TRIMETHOPRIM PO, Take 2 tablets by mouth 2 (two) times a day, Disp: , Rfl:     tamsulosin (FLOMAX) 0 4 mg, Take 0 4 mg by mouth daily with dinner, Disp: , Rfl: 3    betamethasone valerate (VALISONE) 0 1 % cream, as needed , Disp: , Rfl: 3    ceFAZolin (ANCEF) 2000 mg IVPB, Infuse 2,000 mg into a venous catheter every 8 (eight) hours for 23 days (Patient not taking: Reported on 2/7/2020), Disp: 3450 mL, Rfl: 0    Dupilumab, Asthma, (DUPIXENT SC), Inject under the skin , Disp: , Rfl:     econazole nitrate 1 % cream, Apply 1 application topically as needed To affected area, Disp: , Rfl: 0    Past Medical History:   Diagnosis Date    Arthritis     OA    Bruise of both arms     forearms and both hands    Bruises easily     [facility]est[facility] failure) [facility] [address]     Diabetes mellitus [address] [address]     Diabetic foot ulcer ([facility] [address]     Eczema     Erectile dysfunction     Gout     Hyperlipidemia     Hypertension     Murmur     Nephropathy     Osteoarthritis     PAD [facility]peripheral artery disease) ([facility] [address]     Seasonal allergies     Toe infection     bilat great toes    Vertigo     Walks frequently     Wears dentures     upper    Wears glasses      Past Surgical History:   Procedure Laterality Date    CARDIAC PACEMAKER PLACEMENT      CARPAL TUNNEL RELEASE Left     CARPAL TUNNEL RELEASE Right     CARPAL TUNNEL RELEASE      INCISION AND DRAINAGE OF WOUND Left 1/12/2020    Procedure: INCISION AND DRAINAGE (I&D) EXTREMITY AND REMOVAL OF SESMOID BONE;  Surgeon: Dany Mejía DPM;  [facility];  Service: Podiatry    [facility] REPO  1/14/2020    KNEE ARTHROSCOPY Left     KNEE ARTHROSCOPY Right     KNEE SURGERY      NM AMPUTATION TOE,I-P JT Bilateral 5/2/2017    Procedure: PARTIAL AMPUTATION RIGHT AND LEFT HALLUX ;  Surgeon: Carolyn Norris DPM;  [facility];  Service: Podiatry    NM AMPUTATION TOE,MT-P JT Left 7/25/2017    Procedure: 2ND TOE AMPUTATION;  Surgeon: Carolyn Norris DPM;  [facility];  Service: Podiatry    SHOULDER ARTHROSCOPY Left     with screws,RTC    SHOULDER SURGERY  TOE AMPUTATION Left 1/8/2020    Procedure: Lauren Robledo;  Surgeon: Maxim Andrews DPM;  [facility];  Service: Podiatry    VASECTOMY       Family History   Problem Relation Age of Onset    Heart disease Father     No Known Problems Mother     Hypertension Father     Pulmonary embolism Father       reports that he has quit smoking  His smoking use included cigarettes  He has a 30 00 pack-year smoking history  He has never used smokeless tobacco  He reports that he does not drink alcohol or use drugs  Physical Exam:   Vitals:    02/07/20 0959   BP: 136/57   BP Location: Left arm   Patient Position: Sitting   Cuff Size: Standard   Pulse: 64   Weight: 95 8 kg (211 lb 3 2 oz)   Height: 5' 10" (1 778 m)     Body mass index is 30 3 kg/m²  General: conscious, cooperative, in not acute distress  Eyes: conjunctivae pink, anicteric sclerae  ENT: lips and mucous membranes moist  Neck: supple, no JVD  Chest: clear breath sounds bilateral, no crackles, ronchus or wheezings  CVS:  Positive murmur  Abdomen: soft, non-tender, non-distended, normoactive bowel sounds  Extremities:  Diabetic foot wound with wound appropriately dressed  Skin: no rash  Neuro: awake, alert, oriented    Lab Results:     Results for orders placed or performed in visit on 02/03/20   Hemoglobin A1C   Result Value Ref Range    Hemoglobin A1C 6 6        Echocardiogram 8/9/15  - left ventricle had an EF of 60% wall thickness was mildly increased with mild concentric hypertrophy and an abnormal left ventricular relaxation  - left atrium was mildly to moderately dilated  - right atrium was mildly dilated  - mitral valve shows mild annular calcification    Renal artery ultrasound 6/27/19  RIGHT RENAL:  No evidence of significant arterial occlusive disease in the main renal artery  Pulsatile renal vein  Renovascular resistive index of 0 8, indicative of parenchymal disease  Renal/Aorta Ratio: 1 81  The Kidney measures 13 cm  LEFT RENAL:  There is evidence of a >60% stenosis in the ostium and mid renal artery  Pulsatile renal vein  Renovascular resistive index of 0 9, indicative of parenchymal disease  Renal/Aorta Ratio: 2 4  The Kidney measures 13 9 cm  MESENTERIC:  There is evidence of a >70% stenosis in the celiac artery  Superior mesenteric artery is patent  Portions of the record may have been created with voice recognition software  Occasional wrong word or "sound a like" substitutions may have occurred due to the inherent limitations of voice recognition software  Read the chart carefully and recognize, using context, where substitutions have occurred  If you have any questions, please contact the dictating provider

## 2020-02-07 NOTE — CONSULTS
Consultation - Infectious Disease   Mena Uribe 75 y o  male MRN: [id number]  Unit/Bed#: [place] [address] -01 Encounter: [id number]      IMPRESSION & RECOMMENDATIONS:   Impression/Recommendations: This is a 75 y o  male, with DM and CKD, was recently admitted with left big toe cellulitis/osteomyelitis with sepsis and MSSA bacteremia  He had toe amputation and completed 4 weeks IV cefazolin yesterday  He is now admitted for wound super infection with probable osteomyelitis of 1st MT    1  Left foot wound infection  Outpatient wound culture with growth of MDR E coli  I suspect this is super infection in a patient with open wounds on long-term IV cefazolin, which probably select out this MDR pathogen  Patient is clinically and systemically well, without evidence of sepsis or systemic toxicity  Start IV cefepime  Serial foot exams  2  Probable left 1st MT head osteomyelitis  This is most likely secondary to super infection above  Plan for Ceretec scanning noted  Patient will most likely need bone resection  If surgical cure can be established with bone resection, patient will not need additional course of long-term IV antibiotic  Plan for Ceretec scan noted  Recommend bone resection if osteomyelitis is confirmed Ceretec  Patient will not need long-term antibiotic if infected bone can be resected in entirety  3  CKD stage 3  Admission blood cultures are pending  Antibiotic dosages adjusted accordingly  Monitor creatinine  4  DM with neuropathy  Hemoglobin A1c is mildly elevated but can be better controlled  Previous hospitalization records reviewed in detail  Discussed with patient and his wife in detail regarding the above plan  Discussed with Podiatry  Thank you for this consultation  We will follow along with you  HISTORY OF PRESENT ILLNESS:  Reason for Consult:  Left foot osteomyelitis      HPI: Mena Uribe is a 75 y o  male , with history of DM and CKD, was admitted here in early January with left big toe cellulitis, osteomyelitis with sepsis and MSSA bacteremia  Patient eventually had toe amputation  He did well to IV cefazolin, with clearance of bacteremia  He was discharged home to continue IV cefazolin for 4 weeks total, through 2/6  Patient follow-up with his outpatient podiatrist on 01/31  Apparently, there was concern regarding wound  Superficial culture was done, growing MDR E coli  On follow-up visit for podiatrist yesterday, wound appear worse  X-ray was done, apparently showing osteomyelitis of 1st distal metatarsal   Therefore, patient was admitted for further management  At present, patient feels well  He has no pain in his foot  He has no fever chills  He tolerated IV cefazolin well  This completed yesterday  His podiatrist started him on p o  Bactrim  REVIEW OF SYSTEMS:  A complete 12 point system-based review was done  Except for what is noted in HPI above, ROS of systems is otherwise negative      PAST MEDICAL HISTORY:  Past Medical History:   Diagnosis Date    Arthritis     OA    Bruise of both arms     forearms and both hands    Bruises easily     [facility]     Diabetes mellitus [facility] [address]     Diabetic foot ulcer [facility]     Eczema     Erectile dysfunction     Gout     Hyperlipidemia     Hypertension     Murmur     Nephropathy     Osteoarthritis     [facility]peripheral [facility] disease) [facility]     Seasonal allergies     Toe infection     bilat great toes    Vertigo     Walks frequently     Wears dentures     upper    Wears glasses      Past Surgical History:   Procedure Laterality Date    CARDIAC PACEMAKER PLACEMENT      CARPAL TUNNEL RELEASE Left     CARPAL TUNNEL RELEASE Right     CARPAL TUNNEL RELEASE      INCISION AND DRAINAGE OF WOUND Left 1/12/2020    Procedure: INCISION AND DRAINAGE (I&D) EXTREMITY AND REMOVAL OF SESMOID BONE;  Surgeon: Demi Gill DPM;  [facility];  Service: Podiatry    IR PICC REPO  2020    KNEE ARTHROSCOPY Left     KNEE ARTHROSCOPY Right     KNEE SURGERY      [place] AMPUTATION TOE,I-P JT Bilateral 2017    Procedure: PARTIAL AMPUTATION RIGHT AND LEFT HALLUX ;  Surgeon: Divina Lopes DPM;  [facility];  Service: Podiatry    [place] AMPUTATION TOE,MT-P JT Left 2017    Procedure: 2ND TOE AMPUTATION;  Surgeon: Divina Lopes DPM;  [facility];  Service: Podiatry    SHOULDER ARTHROSCOPY Left     with screws,RTC    SHOULDER SURGERY      TOE AMPUTATION Left 2020    Procedure: Delwin Score;  Surgeon: Adelina Ellison DPM;  [facility];  Service: Podiatry    VASECTOMY       Problem list reviewed  FAMILY HISTORY:  Non-contributory    SOCIAL HISTORY:  Social History     Substance and Sexual Activity   Alcohol Use Never    Frequency: Never     Social History     Substance and Sexual Activity   Drug Use Never     Social History     Tobacco Use   Smoking Status Former Smoker    Packs/day: 1 00    Years: 30 00    Pack years: 30 00    Types: Cigarettes   Smokeless Tobacco Never Used   Tobacco Comment    quit 10 years ago       ALLERGIES:  Allergies   Allergen Reactions    Invokana [Canagliflozin]     Lamisil [Terbinafine] Rash    Lamisil [Terbinafine] Blisters     Wife states " His skin peeled from head to toe"    Other Swelling     Pomegranate - facial swelling, no swelling of tongue, esophagus  Adhesive tape   Latex Rash       MEDICATIONS:  All current active medications have been reviewed  Patient is currently on Bactrim  PHYSICAL EXAM:  Vitals:  Temp:  [97 6 °F (36 4 °C)] 97 6 °F (36 4 °C)  HR:  [64-72] 72  Resp:  [20] 20  BP: (136-148)/(57-78) 148/78  Temp (24hrs), Av 6 °F (36 4 °C), Min:97 6 °F (36 4 °C), Max:97 6 °F (36 4 °C)  Current: Temperature: 97 6 °F (36 4 °C)     Physical Exam:  General:  Well-nourished, well-developed, in no acute distress  Awake, alert and oriented x 3    Eyes:  Conjunctive clear with no hemorrhages or effusions  Oropharynx:  No ulcers, no lesions, pharynx benign, no tonsillitis  Neck:  Supple, no lymphadenopathy, no mass, nontender  Lungs:  Expansion symmetric, no rales, no wheezing, no accessory muscle use  Cardiac:  Regular rate and rhythm, normal S1, normal S2, no murmurs  Abdomen:  Soft, nondistended, non-tender, no HSM  Extremities:  Trace leg edema  Foot with dressing in place  No erythema beyond dressing  No tenderness  Photo from Podiatry reviewed  Ulcers without purulence mild erythema  Skin:  No rashes, no ulcers  Neurological:  Moves all four extremities spontaneously, sensation grossly intact    LABS, IMAGING, & OTHER STUDIES:  Lab Results:  I have personally reviewed pertinent labs  Imaging Studies:   I have personally reviewed pertinent imaging study reports and images in PACS  EKG, Pathology, and Other Studies:   I have personally reviewed pertinent reports

## 2020-02-07 NOTE — PLAN OF CARE
Problem: Potential for Falls  Goal: Patient will remain free of falls  Description  INTERVENTIONS:  - Assess patient frequently for physical needs  -  Identify cognitive and physical deficits and behaviors that affect risk of falls    -  [place] fall precautions as indicated by assessment   - Educate patient/family on patient safety including physical limitations  - Instruct patient to call for assistance with activity based on assessment  - Modify environment to reduce risk of injury  - Consider OT/PT consult to assist with strengthening/mobility  Outcome: Progressing     Problem: INFECTION - ADULT  Goal: Absence or prevention of progression during hospitalization  Description  INTERVENTIONS:  - Assess and monitor for signs and symptoms of infection  - Monitor lab/diagnostic results  - Monitor all insertion sites, i e  indwelling lines, tubes, and drains  - Monitor endotracheal if appropriate and nasal secretions for changes in amount and color  - [place] appropriate cooling/warming therapies per order  - Administer medications as ordered  - Instruct and encourage patient and family to use good hand hygiene technique  - Identify and instruct in appropriate isolation precautions for identified infection/condition  Outcome: Progressing     Problem: SAFETY ADULT  Goal: Maintain or return to baseline ADL function  Description  INTERVENTIONS:  -  Assess patient's ability to carry out ADLs; assess patient's baseline for ADL function and identify physical deficits which impact ability to perform ADLs (bathing, care of mouth/teeth, toileting, grooming, dressing, etc )  - Assess/evaluate cause of self-care deficits   - Assess range of motion  - Assess patient's mobility; develop plan if impaired  - Assess patient's need for assistive devices and provide as appropriate  - Encourage maximum independence but intervene and supervise when necessary  - Involve family in performance of ADLs  - Assess for home care needs following discharge   - Consider OT consult to assist with ADL evaluation and planning for discharge  - Provide patient education as appropriate  Outcome: Progressing  Goal: Maintain or return mobility status to optimal level  Description  INTERVENTIONS:  - Assess patient's baseline mobility status (ambulation, transfers, stairs, etc )    - Identify cognitive and physical deficits and behaviors that affect mobility  - Identify mobility aids required to assist with transfers and/or ambulation (gait belt, sit-to-stand, lift, walker, cane, etc )  - [place] fall precautions as indicated by assessment  - Record patient progress and toleration of activity level on Mobility SBAR; progress patient to next Phase/Stage  - Instruct patient to call for assistance with activity based on assessment  - Consider rehabilitation consult to assist with strengthening/weightbearing, etc   Outcome: Progressing     Problem: DISCHARGE PLANNING  Goal: Discharge to home or other facility with appropriate resources  Description  INTERVENTIONS:  - Identify barriers to discharge w/patient and caregiver  - Arrange for needed discharge resources and transportation as appropriate  - Identify discharge learning needs (meds, wound care, etc )  - Arrange for interpretive services to assist at discharge as needed  - Refer to Case Management Department for coordinating discharge planning if the patient needs post-hospital services based on physician/advanced practitioner order or complex needs related to functional status, cognitive ability, or social support system  Outcome: Progressing     Problem: SKIN/TISSUE INTEGRITY - ADULT  Goal: Skin integrity remains intact  Description  INTERVENTIONS  - Identify patients at risk for skin breakdown  - Assess and monitor skin integrity  - Assess and monitor nutrition and hydration status  - Monitor labs (i e  albumin)  - Assess for incontinence   - Turn and reposition patient  - Assist with mobility/ambulation  - Relieve pressure over bony prominences  - Avoid friction and shearing  - Provide appropriate hygiene as needed including keeping skin clean and dry  - Evaluate need for skin moisturizer/barrier cream  - Collaborate with interdisciplinary team (i e  Nutrition, Rehabilitation, etc )   - Patient/family teaching  Outcome: Progressing  Goal: Incision(s), wounds(s) or drain site(s) healing without S/S of infection  Description  INTERVENTIONS  - Assess and document risk factors for skin impairment   - Assess and document dressing, incision, wound bed, drain sites and surrounding tissue  - Consider nutrition services referral as needed  - Oral mucous membranes remain intact  - Provide patient/ family education  Outcome: Progressing  Goal: Oral mucous membranes remain intact  Description  INTERVENTIONS  - Assess oral mucosa and hygiene practices  - Implement preventative oral hygiene regimen  - Implement oral medicated treatments as ordered  - Initiate Nutrition services referral as needed  Outcome: Progressing

## 2020-02-07 NOTE — TELEPHONE ENCOUNTER
KARLOS Peña from [facility] was supposed to go visit patient today at home however pt was re-admitted into [facility]

## 2020-02-07 NOTE — PATIENT INSTRUCTIONS
Chronic Kidney Disease   WHAT YOU NEED TO KNOW:   Chronic kidney disease (CKD) is the gradual and permanent loss of kidney function  It is also called chronic kidney failure, or chronic renal insufficiency  Normally, the kidneys remove fluid, chemicals, and waste from your blood  These wastes are turned into urine by your kidneys  CKD may worsen over time and lead to kidney failure  DISCHARGE INSTRUCTIONS:   Return to the emergency department if:   · You are confused and very drowsy  · You have a seizure  · You have shortness of breath  Contact your healthcare provider if:   · You suddenly gain or lose more weight than your healthcare provider has told you is okay  · You have itchy skin or a rash  · You urinate more or less than you normally do  · You have blood in your urine  · You have nausea and repeated vomiting  · You have fatigue or muscle weakness  · You have hiccups that will not stop  · You have questions or concerns about your condition or care  Medicines:   · Medicines  may be given to decrease blood pressure and get rid of extra fluid  You may also receive medicine to manage health conditions that may occur with CKD, such as anemia, diabetes, and heart disease  · Take your medicine as directed  Contact your healthcare provider if you think your medicine is not helping or if you have side effects  Tell him or her if you are allergic to any medicine  Keep a list of the medicines, vitamins, and herbs you take  Include the amounts, and when and why you take them  Bring the list or the pill bottles to follow-up visits  Carry your medicine list with you in case of an emergency  Follow up with your healthcare provider as directed: You will need to return for tests to monitor your kidney function  You may also be referred to a kidney specialist  Write down your questions so you remember to ask them during your visits  Manage other health conditions:   Follow your healthcare provider's directions on how to manage diabetes, high blood pressure, and heart disease  These conditions can make CKD worse  Talk to your healthcare provider before you take over-the-counter medicine  Medicines such as NSAIDs, stomach medicine, or laxatives may harm your kidneys  Weigh yourself daily:  Ask your healthcare provider what your weight should be  Ask how much liquid you should drink each day  CKD may cause you to gain or lose weight rapidly  Weigh yourself every day  Write down your weight, how much liquid you drink or eat, and how much you urinate each day  Contact your healthcare provider if your weight is higher or lower than it should be  Manage CKD:   · Maintain a healthy weight  Ask your healthcare provider how much you should weigh  Ask him to help you create a weight loss plan if you are overweight  · Exercise 30 to 60 minutes a day, 4 to 7 times a week, or as directed  Ask about the best exercise plan for you  Regular exercise can help you manage CKD, high blood pressure, and diabetes  · Follow your healthcare provider's advice about what to eat and drink  He may tell you to eat food low in sodium (salt), potassium, phosphorus, or protein  You may need to see a dietitian if you need help planning meals  Ask how much liquid to drink each day and which liquids are best for you  · Limit alcohol  Ask how much alcohol is safe for you to drink  A drink of alcohol is 12 ounces of beer, 5 ounces of wine, or 1½ ounces of liquor  · Do not smoke  Nicotine and other chemicals in cigarettes and cigars can cause lung and kidney damage  Ask your healthcare provider for information if you currently smoke and need help to quit  E-cigarettes or smokeless tobacco still contain nicotine  Talk to your healthcare provider before you use these products  · Ask your healthcare provider if you need vaccines    Infections such as pneumonia, influenza, and hepatitis can be more harmful or more likely to occur in a person who has CKD  Vaccines reduce your risk of infection with these viruses  © 2017 [facility] Information is for End User's use only and may not be sold, redistributed or otherwise used for commercial purposes  All illustrations and images included in CareNotes® are the copyrighted property of A D A M [employer] Inc  or Buster Giraldo  The above information is an  only  It is not intended as medical advice for individual conditions or treatments  Talk to your doctor, nurse or pharmacist before following any medical regimen to see if it is safe and effective for you

## 2020-02-07 NOTE — H&P
H&P Exam - Willard Brian 75 y o  male MRN: [id number]    Unit/Bed#: [facility] 2 -01 Encounter: [id number]    Assessment:  1  Left foot surgical wound dehiscence with suspected underlying OM  - S/P left hallux amputation (DOS: 1/8/20)  - S/P left foot I&D with removal of sesamoids (DOS: 1/12/20)  2  DM type 2 with neuropathy  3  Acute respiratory failure  4  Complete heart block  5  PAF  6  Acute diastolic congestive heart failure  7  CKD stage 3  8  Hx of Staph aureus bacteremia    Plan:  · Directly admitted under Dr Mckay Veloz for surgical wound dehiscence and suspected underlying OM  Will likely plan up left partial 1st ray amputation this admission  · IV cefepime per ID recommendations  · Will hold Eliquis for now due to surgery likely on Monday, or Tuesday  Will consult cardiology for recommendations  · Will consult SLIM for postoperative clearance, and medical management  · Will continue patient on home medications with insulin sliding scale  · Nonweightbearing left lower extremity  · New x-rays of the left foot ordered  Ceretec scan ordered  History of Present Illness   Willard Brian is a 57-year-old male with a past medical history DM type 2 with neuropathy, respiratory failure, heart block, PAF, CHF, CKD, and history of Staph aureus bacteremia that presents for direct admission today under Dr Mckay Veloz for left surgical wound dehiscence with suspected underlying OM  He underwent a left hallux amputation on 01/08/2020 with Dr Irasema Espinoza  He subsequently underwent a left foot I&D with removal sesamoid on 01/12/2020 with Dr Irasema Espinoza as well  He has been following up as an outpatient and was last seen by Dr Mckay Veloz earlier today  She reports to wound that has not healed since the surgery that both probe to bone  Outpatient x-rays revealed cortical irregularities along the remaining 1st metatarsal head    He was sent for direct admission to get further workup for osteomyelitis, and possible surgical intervention  He was supposed to get his PICC line out today due to his history of bacteremia, but due to the recent admission it was left in  He denies any recent nausea, vomiting, fever, chills, shortness of breath, chest pains  Review of Systems   Constitutional: Negative for chills, fatigue and fever  HENT: Negative  Respiratory: Negative for chest tightness, shortness of breath and wheezing  Cardiovascular: Negative for chest pain and leg swelling  Gastrointestinal: Negative for abdominal pain, constipation, diarrhea, nausea and vomiting  Genitourinary: Negative for difficulty urinating  Skin: Positive for color change and wound  Neurological: Negative for dizziness, syncope, weakness, numbness and headaches  Psychiatric/Behavioral: Negative          Historical Information   Past Medical History:   Diagnosis Date    Arthritis     OA    Bruise of both arms     forearms and both hands    Bruises easily     [facility] [address] [facility]     Diabetes [facility]     Diabetic foot ulcer [facility] [address] [facility]     Eczema     Erectile dysfunction     Gout     Hyperlipidemia     Hypertension     Murmur     Nephropathy     Osteoarthritis     [facility]     Seasonal allergies     Toe infection     bilat great toes    Vertigo     Walks frequently     Wears dentures     upper    Wears glasses      Past Surgical History:   Procedure Laterality Date    CARDIAC PACEMAKER PLACEMENT      CARPAL TUNNEL RELEASE Left     CARPAL TUNNEL RELEASE Right     CARPAL TUNNEL RELEASE      INCISION AND DRAINAGE OF WOUND Left 1/12/2020    Procedure: INCISION AND DRAINAGE (I&D) EXTREMITY AND REMOVAL OF SESMOID BONE;  Surgeon: Esperanza Desir DPM;  [facility];  Service: Podiatry    IR PICC REPO  1/14/2020    KNEE ARTHROSCOPY Left     KNEE ARTHROSCOPY Right     KNEE SURGERY      ME AMPUTATION TOE,I-P JT Bilateral 5/2/2017    Procedure: PARTIAL AMPUTATION RIGHT AND LEFT HALLUX ;  Surgeon: Palma Mckeon DPM;  [facility];  Service: Podiatry    ND AMPUTATION TOE,MT-P JT Left 7/25/2017    Procedure: 2ND TOE AMPUTATION;  Surgeon: Palma Mckeon DPM;  [facility];  Service: Podiatry    SHOULDER ARTHROSCOPY Left     with screws,RTC    SHOULDER SURGERY      TOE AMPUTATION Left 1/8/2020    Procedure: Ez Record;  Surgeon: Moise Cordova DPM;  [facility];  Service: Podiatry    VASECTOMY       Social History   Social History     Substance and Sexual Activity   Alcohol Use Never    Frequency: Never     Social History     Substance and Sexual Activity   Drug Use Never     Social History     Tobacco Use   Smoking Status Former Smoker    Packs/day: 1 00    Years: 30 00    Pack years: 30 00    Types: Cigarettes   Smokeless Tobacco Never Used   Tobacco Comment    quit 10 years ago     Family History: non-contributory    Meds/Allergies   all medications and allergies reviewed  Allergies   Allergen Reactions    Invokana [Canagliflozin]     Lamisil [Terbinafine] Rash    Lamisil [Terbinafine] Blisters     Wife states " His skin peeled from head to toe"    Other Swelling     Pomegranate - facial swelling, no swelling of tongue, esophagus  Adhesive tape        Latex Rash       Objective   First Vitals:   Blood Pressure: 148/78 (02/07/20 1612)  Pulse: 72 (02/07/20 1612)  Temperature: 97 6 °F (36 4 °C) (02/07/20 1612)  Temp Source: Oral (02/07/20 1612)  Respirations: 20 (02/07/20 1612)  Height: 5' 10" (177 8 cm) (02/07/20 1640)    Current Vitals:   Blood Pressure: 148/78 (02/07/20 1612)  Pulse: 72 (02/07/20 1612)  Temperature: 97 6 °F (36 4 °C) (02/07/20 1612)  Temp Source: Oral (02/07/20 1612)  Respirations: 20 (02/07/20 1612)  Height: 5' 10" (177 8 cm) (02/07/20 1640)    No intake or output data in the 24 hours ending 02/07/20 1723    Invasive Devices     Peripherally Inserted Central Catheter Line            PICC Line 93/98/18 Right Basilic 25 days Physical Exam   Constitutional: He is oriented to person, place, and time  He appears well-developed and well-nourished  HENT:   Head: Normocephalic and atraumatic  Eyes: Pupils are equal, round, and reactive to light  Right eye exhibits no discharge  Left eye exhibits no discharge  Neck: Normal range of motion  Neck supple  Cardiovascular: Normal rate, regular rhythm, normal heart sounds and intact distal pulses  Pedal pulses are nonpalpable on the left lower extremity  Capillary fill time is less than 3 seconds to all digits  Digital hair is absent  Pulmonary/Chest: Effort normal and breath sounds normal  No respiratory distress  He has no wheezes  He has no rales  He exhibits no tenderness  Abdominal: Soft  Bowel sounds are normal    Musculoskeletal: Normal range of motion  He exhibits edema  MMT is 4/5 bilaterally  No calf pain noted bilaterally  No pain on palpation of bilateral   History of left 1st, 2nd, and 3rd digit amputations  Neurological: He is alert and oriented to person, place, and time  Gross sensation is intact, protective sensation is diminished  Skin: Skin is warm  Capillary refill takes less than 2 seconds  There is erythema  Left surgical wound dehiscence noted at the proximal, and distal ends of the previous surgical incision  The proximal wound measures approximately 2 3 x 0 4 x 0 5 cm  Wound probes to bone  Wound bed is mainly granular in appearance  Mild serosanguineous drainage noted  Mild rolled edges noted  Distal wound measures approximately 0 3 x 0 3 x 1 0 cm  Wound also probes to bone with a fibro granular wound bed  Surrounding skin is macerated  Serosanguineous drainage noted  Erythema noted to the entire forefoot  Psychiatric: He has a normal mood and affect  His behavior is normal  Judgment and thought content normal    Vitals reviewed        Left foot    Lab Results:  Ordered  Imaging:  X-ray, Ceretec ordered    Code Status: Level 1 - Full Code    Counseling / Coordination of Care: Total floor / unit time spent today 30 minutes

## 2020-02-08 ENCOUNTER — APPOINTMENT (INPATIENT)
Dept: RADIOLOGY | Facility: HOSPITAL | Age: 65
DRG: 907 | End: 2020-02-08
Payer: MEDICARE

## 2020-02-08 LAB
ANION GAP SERPL CALCULATED.3IONS-SCNC: 9 MMOL/L (ref 4–13)
BACTERIA UR QL AUTO: ABNORMAL /HPF
BASOPHILS # BLD AUTO: 0.08 THOUSANDS/ΜL (ref 0–0.1)
BASOPHILS NFR BLD AUTO: 1 % (ref 0–1)
BILIRUB UR QL STRIP: NEGATIVE
BUN SERPL-MCNC: 66 MG/DL (ref 5–25)
CALCIUM SERPL-MCNC: 8.2 MG/DL (ref 8.3–10.1)
CHLORIDE SERPL-SCNC: 106 MMOL/L (ref 100–108)
CLARITY UR: CLEAR
CO2 SERPL-SCNC: 22 MMOL/L (ref 21–32)
COLOR UR: YELLOW
CREAT SERPL-MCNC: 2.02 MG/DL (ref 0.6–1.3)
EOSINOPHIL # BLD AUTO: 1.3 THOUSAND/ΜL (ref 0–0.61)
EOSINOPHIL NFR BLD AUTO: 14 % (ref 0–6)
ERYTHROCYTE [DISTWIDTH] IN BLOOD BY AUTOMATED COUNT: 14.6 % (ref 11.6–15.1)
FINE GRAN CASTS URNS QL MICRO: ABNORMAL /LPF
GFR SERPL CREATININE-BSD FRML MDRD: 33 ML/MIN/1.73SQ M
GLUCOSE SERPL-MCNC: 120 MG/DL (ref 65–140)
GLUCOSE SERPL-MCNC: 131 MG/DL (ref 65–140)
GLUCOSE SERPL-MCNC: 153 MG/DL (ref 65–140)
GLUCOSE SERPL-MCNC: 76 MG/DL (ref 65–140)
GLUCOSE SERPL-MCNC: 82 MG/DL (ref 65–140)
GLUCOSE UR STRIP-MCNC: NEGATIVE MG/DL
HCT VFR BLD AUTO: 24.6 % (ref 36.5–49.3)
HGB BLD-MCNC: 7.5 G/DL (ref 12–17)
HGB UR QL STRIP.AUTO: ABNORMAL
IMM GRANULOCYTES # BLD AUTO: 0.03 THOUSAND/UL (ref 0–0.2)
IMM GRANULOCYTES NFR BLD AUTO: 0 % (ref 0–2)
KETONES UR STRIP-MCNC: NEGATIVE MG/DL
LEUKOCYTE ESTERASE UR QL STRIP: NEGATIVE
LYMPHOCYTES # BLD AUTO: 1.18 THOUSANDS/ΜL (ref 0.6–4.47)
LYMPHOCYTES NFR BLD AUTO: 13 % (ref 14–44)
MCH RBC QN AUTO: 28.4 PG (ref 26.8–34.3)
MCHC RBC AUTO-ENTMCNC: 30.5 G/DL (ref 31.4–37.4)
MCV RBC AUTO: 93 FL (ref 82–98)
MONOCYTES # BLD AUTO: 0.88 THOUSAND/ΜL (ref 0.17–1.22)
MONOCYTES NFR BLD AUTO: 10 % (ref 4–12)
NEUTROPHILS # BLD AUTO: 5.82 THOUSANDS/ΜL (ref 1.85–7.62)
NEUTS SEG NFR BLD AUTO: 62 % (ref 43–75)
NITRITE UR QL STRIP: NEGATIVE
NON-SQ EPI CELLS URNS QL MICRO: ABNORMAL /HPF
NRBC BLD AUTO-RTO: 0 /100 WBCS
NT-PROBNP SERPL-MCNC: 2811 PG/ML
PH UR STRIP.AUTO: 6.5 [PH]
PLATELET # BLD AUTO: 292 THOUSANDS/UL (ref 149–390)
PMV BLD AUTO: 9.8 FL (ref 8.9–12.7)
POTASSIUM SERPL-SCNC: 5 MMOL/L (ref 3.5–5.3)
PROT UR STRIP-MCNC: >=300 MG/DL
RBC # BLD AUTO: 2.64 MILLION/UL (ref 3.88–5.62)
RBC #/AREA URNS AUTO: ABNORMAL /HPF
SODIUM SERPL-SCNC: 137 MMOL/L (ref 136–145)
SP GR UR STRIP.AUTO: 1.02 (ref 1–1.03)
UROBILINOGEN UR QL STRIP.AUTO: 0.2 E.U./DL
WBC # BLD AUTO: 9.29 THOUSAND/UL (ref 4.31–10.16)
WBC #/AREA URNS AUTO: ABNORMAL /HPF

## 2020-02-08 PROCEDURE — 80048 BASIC METABOLIC PNL TOTAL CA: CPT | Performed by: STUDENT IN AN ORGANIZED HEALTH CARE EDUCATION/TRAINING PROGRAM

## 2020-02-08 PROCEDURE — 82948 REAGENT STRIP/BLOOD GLUCOSE: CPT

## 2020-02-08 PROCEDURE — 99223 1ST HOSP IP/OBS HIGH 75: CPT | Performed by: INTERNAL MEDICINE

## 2020-02-08 PROCEDURE — 83880 ASSAY OF NATRIURETIC PEPTIDE: CPT | Performed by: NURSE PRACTITIONER

## 2020-02-08 PROCEDURE — 71045 X-RAY EXAM CHEST 1 VIEW: CPT

## 2020-02-08 PROCEDURE — 99222 1ST HOSP IP/OBS MODERATE 55: CPT | Performed by: PHYSICIAN ASSISTANT

## 2020-02-08 PROCEDURE — 85025 COMPLETE CBC W/AUTO DIFF WBC: CPT | Performed by: STUDENT IN AN ORGANIZED HEALTH CARE EDUCATION/TRAINING PROGRAM

## 2020-02-08 PROCEDURE — 94660 CPAP INITIATION&MGMT: CPT

## 2020-02-08 PROCEDURE — 81001 URINALYSIS AUTO W/SCOPE: CPT | Performed by: NURSE PRACTITIONER

## 2020-02-08 RX ORDER — AMLODIPINE BESYLATE 10 MG/1
10 TABLET ORAL DAILY
Status: DISCONTINUED | OUTPATIENT
Start: 2020-02-08 | End: 2020-02-13 | Stop reason: HOSPADM

## 2020-02-08 RX ADMIN — TAMSULOSIN HYDROCHLORIDE 0.4 MG: 0.4 CAPSULE ORAL at 16:38

## 2020-02-08 RX ADMIN — FERROUS GLUCONATE 324 MG: 324 TABLET ORAL at 15:16

## 2020-02-08 RX ADMIN — HEPARIN SODIUM 5000 UNITS: 5000 INJECTION INTRAVENOUS; SUBCUTANEOUS at 13:33

## 2020-02-08 RX ADMIN — HEPARIN SODIUM 5000 UNITS: 5000 INJECTION INTRAVENOUS; SUBCUTANEOUS at 21:26

## 2020-02-08 RX ADMIN — FUROSEMIDE 40 MG: 40 TABLET ORAL at 15:16

## 2020-02-08 RX ADMIN — Medication 1000 MG: at 08:19

## 2020-02-08 RX ADMIN — FERROUS GLUCONATE 324 MG: 324 TABLET ORAL at 06:18

## 2020-02-08 RX ADMIN — AMLODIPINE BESYLATE 10 MG: 10 TABLET ORAL at 08:20

## 2020-02-08 RX ADMIN — FUROSEMIDE 40 MG: 40 TABLET ORAL at 08:19

## 2020-02-08 RX ADMIN — Medication 1 TABLET: at 08:19

## 2020-02-08 RX ADMIN — METOPROLOL TARTRATE 50 MG: 50 TABLET, FILM COATED ORAL at 08:19

## 2020-02-08 RX ADMIN — ALLOPURINOL 300 MG: 100 TABLET ORAL at 08:19

## 2020-02-08 RX ADMIN — HEPARIN SODIUM 5000 UNITS: 5000 INJECTION INTRAVENOUS; SUBCUTANEOUS at 05:25

## 2020-02-08 RX ADMIN — Medication 1000 MG: at 17:33

## 2020-02-08 RX ADMIN — CEFEPIME HYDROCHLORIDE 1000 MG: 1 INJECTION, POWDER, FOR SOLUTION INTRAMUSCULAR; INTRAVENOUS at 16:38

## 2020-02-08 RX ADMIN — INSULIN LISPRO 1 UNITS: 100 INJECTION, SOLUTION INTRAVENOUS; SUBCUTANEOUS at 16:39

## 2020-02-08 RX ADMIN — CEFEPIME HYDROCHLORIDE 1000 MG: 1 INJECTION, POWDER, FOR SOLUTION INTRAMUSCULAR; INTRAVENOUS at 05:24

## 2020-02-08 RX ADMIN — PRAVASTATIN SODIUM 20 MG: 20 TABLET ORAL at 16:38

## 2020-02-08 RX ADMIN — LORAZEPAM 0.5 MG: 0.5 TABLET ORAL at 18:07

## 2020-02-08 RX ADMIN — METOPROLOL TARTRATE 50 MG: 50 TABLET, FILM COATED ORAL at 21:56

## 2020-02-08 NOTE — PLAN OF CARE
Problem: Potential for Falls  Goal: Patient will remain free of falls  Description  INTERVENTIONS:  - Assess patient frequently for physical needs  -  Identify cognitive and physical deficits and behaviors that affect risk of falls    -  [place] fall precautions as indicated by assessment   - Educate patient/family on patient safety including physical limitations  - Instruct patient to call for assistance with activity based on assessment  - Modify environment to reduce risk of injury  - Consider OT/PT consult to assist with strengthening/mobility  Outcome: Progressing     Problem: INFECTION - ADULT  Goal: Absence or prevention of progression during hospitalization  Description  INTERVENTIONS:  - Assess and monitor for signs and symptoms of infection  - Monitor lab/diagnostic results  - Monitor all insertion sites, i e  indwelling lines, tubes, and drains  - Monitor endotracheal if appropriate and nasal secretions for changes in amount and color  - Ophelia appropriate cooling/warming therapies per order  - Administer medications as ordered  - Instruct and encourage patient and family to use good hand hygiene technique  - Identify and instruct in appropriate isolation precautions for identified infection/condition  Outcome: Progressing     Problem: SAFETY ADULT  Goal: Maintain or return to baseline ADL function  Description  INTERVENTIONS:  -  Assess patient's ability to carry out ADLs; assess patient's baseline for ADL function and identify physical deficits which impact ability to perform ADLs (bathing, care of mouth/teeth, toileting, grooming, dressing, etc )  - Assess/evaluate cause of self-care deficits   - Assess range of motion  - Assess patient's mobility; develop plan if impaired  - Assess patient's need for assistive devices and provide as appropriate  - Encourage maximum independence but intervene and supervise when necessary  - Involve family in performance of ADLs  - Assess for home care needs following discharge   - Consider OT consult to assist with ADL evaluation and planning for discharge  - Provide patient education as appropriate  Outcome: Progressing  Goal: Maintain or return mobility status to optimal level  Description  INTERVENTIONS:  - Assess patient's baseline mobility status (ambulation, transfers, stairs, etc )    - Identify cognitive and physical deficits and behaviors that affect mobility  - Identify mobility aids required to assist with transfers and/or ambulation (gait belt, sit-to-stand, lift, walker, cane, etc )  - [place] fall precautions as indicated by assessment  - Record patient progress and toleration of activity level on Mobility SBAR; progress patient to next Phase/Stage  - Instruct patient to call for assistance with activity based on assessment  - Consider rehabilitation consult to assist with strengthening/weightbearing, etc   Outcome: Progressing     Problem: DISCHARGE PLANNING  Goal: Discharge to home or other facility with appropriate resources  Description  INTERVENTIONS:  - Identify barriers to discharge w/patient and caregiver  - Arrange for needed discharge resources and transportation as appropriate  - Identify discharge learning needs (meds, wound care, etc )  - Arrange for interpretive services to assist at discharge as needed  - Refer to Case Management Department for coordinating discharge planning if the patient needs post-hospital services based on physician/advanced practitioner order or complex needs related to functional status, cognitive ability, or social support system  Outcome: Progressing     Problem: SKIN/TISSUE INTEGRITY - ADULT  Goal: Skin integrity remains intact  Description  INTERVENTIONS  - Identify patients at risk for skin breakdown  - Assess and monitor skin integrity  - Assess and monitor nutrition and hydration status  - Monitor labs (i e  albumin)  - Assess for incontinence   - Turn and reposition patient  - Assist with mobility/ambulation  - Relieve pressure over bony prominences  - Avoid friction and shearing  - Provide appropriate hygiene as needed including keeping skin clean and dry  - Evaluate need for skin moisturizer/barrier cream  - Collaborate with interdisciplinary team (i e  Nutrition, Rehabilitation, etc )   - Patient/family teaching  Outcome: Progressing  Goal: Incision(s), wounds(s) or drain site(s) healing without S/S of infection  Description  INTERVENTIONS  - Assess and document risk factors for skin impairment   - Assess and document dressing, incision, wound bed, drain sites and surrounding tissue  - Consider nutrition services referral as needed  - Oral mucous membranes remain intact  - Provide patient/ family education  Outcome: Progressing  Goal: Oral mucous membranes remain intact  Description  INTERVENTIONS  - Assess oral mucosa and hygiene practices  - Implement preventative oral hygiene regimen  - Implement oral medicated treatments as ordered  - Initiate Nutrition services referral as needed  Outcome: Progressing     Problem: Prexisting or High Potential for Compromised Skin Integrity  Goal: Skin integrity is maintained or improved  Description  INTERVENTIONS:  - Identify patients at risk for skin breakdown  - Assess and monitor skin integrity  - Assess and monitor nutrition and hydration status  - Monitor labs   - Assess for incontinence   - Turn and reposition patient  - Assist with mobility/ambulation  - Relieve pressure over bony prominences  - Avoid friction and shearing  - Provide appropriate hygiene as needed including keeping skin clean and dry  - Evaluate need for skin moisturizer/barrier cream  - Collaborate with interdisciplinary team   - Patient/family teaching  - Consider wound care consult   Outcome: Progressing

## 2020-02-08 NOTE — ASSESSMENT & PLAN NOTE
Lab Results   Component Value Date    HGBA1C 6 6 02/03/2020       Recent Labs     02/07/20  2105 02/07/20  2136 02/08/20  0733 02/08/20  1055   POCGLU 83 135 82 120       Blood Sugar Average: Last 72 hrs:  (P) 105 4

## 2020-02-08 NOTE — CONSULTS
,  [address] 75 y o  male MRN: [id number]  Unit/Bed#: [place] [address] [id number] Encounter: [id number]    ASSESSMENT and PLAN:  1  Acute kidney injury on Chronic kidney disease, stage IIIB:    · Nephrologist:  Dr Army Paulson  · Baseline creatinine appears to be between 1 2-1 5 although creatinine intermittently has been as high as 1 8  · Recent acute kidney injury with creatinine up to 3 during hospitalization in January  Likely ATN  Creatinine plateaued near 4 9-8 at discharge  He presents with a creatinine of 2 05     · Recent renal ultrasound right kidney 13 8 cm, left kidney 14 5 cm, right kidney Renal parents time a diffusely echogenic consistent with medical renal disease  Simple cyst right kidney, no hydronephrosis bilaterally, no shadowing calculi  Left kidney normal echogenicity and contour  Jones catheter was in place during study  · Urinalysis 01/19/2020:  Greater than 3+ protein, 4-10 RBCs, 4-10 WBCs, 0-1 fine granular casts  Moderate bacteria  Eosinophils 1%  · Nephrotic range proteinuria -Last urine protein creatinine ratio 4 1 g July 2019  · Plan:  · Hold losartan  · No indication for IV fluids at this time  · Check BMP daily  · Check urinalysis  · No NSAIDs  · Avoid hypotension  · Monitor I&O  · Daily weight  2  Hypertension:    · Blood pressure acceptable at this time  · He is currently on amlodipine 10 mg daily, losartan 25 mg daily, furosemide 40 mg b i d , metoprolol 50 mg every 12 hours  · Plan:    · In light of pending surgery and infection -Hold losartan and closely monitor blood pressure  If blood pressure increases we can add medication that has less of an affect on renal hemodynamics  · Continue Lasix due to history of CHF, flash pulmonary edema  · Discontinue metolazone (patient reports he has the medication p r n  but has never taken it)  · Low sodium diet added   3  Left renal artery stenosis 60%  History of flash pulmonary edema    Also history of mitral valve stenosis  4  Left foot wound secondary to surgical wound dehiscence  · Concern for underlying osteomyelitis  Recent Staph aureus bacteremia   · Patient for Ceretec scan left foot  · Podiatry following  · Tentative plan for surgical intervention Monday or Tuesday  · On cefepime  5  Acid-base:  Acceptable, bicarbonate 22  6  Electrolytes:  Potassium high normal, 5 3 on admission, currently 5 0  Losartan on hold  Monitor  7  Diastolic CHF:  Appears compensated  8  Anemia of chronic disease:    · Hemoglobin recently in the 8s, today hemoglobin down to 7 5  · Monitor  9  History of sick sinus syndrome:  Status post permanent pacemaker  10  Diabetes mellitus type 2:  Management per primary team  11  PAF:  Eliquis on hold due to pending surgery      HISTORY OF PRESENT ILLNESS:  Requesting Physician: Bacilio Edwards DPM  Reason for Consult:  CKD    Fernando Herzog is a 75 y o  male with a history of CKD, hypertension, diabetes mellitus type 2, mitral valve stenosis, flash pulmonary edema, renal artery stenosis, hyperlipidemia, arthritis, gout, left hallux amputation 01/08/2020 , SSS status post PPP, PAF, diastolic CHF, Staph aureus bacteremia who was admitted to [facility] due to surgical wound dehiscence and suspected underlying osteomyelitis  He recently was hospitalized 01/7/2020 to 1/29/20 and was treated for left hallux infection with bacteremia  During hospitalization creatinine was above baseline, peaking at 3 and down to 1 88 at discharge  He presents with a creatinine of 2 05 which appears to be near recent baseline  Prior baseline between 1 2-1 8  The patient was seen and examined  He states that he feels fine  No fevers or chills  No diaphoresis  No difficulty voiding  No chest pain or shortness of breath  Edema is at baseline  He is on Lasix twice a day    He does not take metolazone although he has it available p r n   A renal consultation is requested today for assistance in the management of CKD      PAST MEDICAL HISTORY:  Past Medical History:   Diagnosis Date    Arthritis     OA    Bruise of both arms     forearms and both hands    Bruises easily     [facility]cong[facility]ive [facility] failure) [facility]     Diabetes mellitus ([address] )     Diabetic foot ulcer ([facility]     Eczema     Erectile dysfunction     Gout     Hyperlipidemia     Hypertension     Murmur     Nephropathy     Osteoarthritis     PAD (peripheral artery disease) ([facility]     Seasonal allergies     Toe infection     bilat great toes    Vertigo     Walks frequently     Wears dentures     upper    Wears glasses        PAST SURGICAL HISTORY:  Past Surgical History:   Procedure Laterality Date    CARDIAC PACEMAKER PLACEMENT      CARPAL TUNNEL RELEASE Left     CARPAL TUNNEL RELEASE Right     CARPAL TUNNEL RELEASE      INCISION AND DRAINAGE OF WOUND Left 1/12/2020    Procedure: INCISION AND DRAINAGE (I&D) EXTREMITY AND REMOVAL OF SESMOID BONE;  Surgeon: Sanjuanita Rojas DPM;  [facility];  Service: Podiatry    [facility]    KNEE ARTHROSCOPY Left     KNEE ARTHROSCOPY Right     KNEE SURGERY      OR AMPUTATION TOE,I-P JT Bilateral 5/2/2017    Procedure: PARTIAL AMPUTATION RIGHT AND LEFT HALLUX ;  Surgeon: Ashli Nino DPM;  [facility];  Service: Podiatry    OR AMPUTATION TOE,MT-P JT Left 7/25/2017    Procedure: 2ND TOE AMPUTATION;  Surgeon: Ashli Nino DPM;  [facility];  Service: Podiatry    SHOULDER ARTHROSCOPY Left     [facility] screws,[facility]    SHOULDER SURGERY      TOE AMPUTATION Left 1/8/2020    Procedure: HALLUX AMPUTATION;  Surgeon: Sanjuanita Rojas DPM;  [facility];  Service: Podiatry    VASECTOMY         ALLERGIES:  Allergies   Allergen Reactions    Invokana [Canagliflozin]     Lamisil [Terbinafine] Rash    Lamisil [Terbinafine] Blisters     Wife states " His skin peeled from head to toe"    Other Swelling     Pomegranate - facial swelling, no swelling of tongue, esophagus  Adhesive tape        Latex Rash       SOCIAL HISTORY:  Social History     Substance and Sexual Activity   Alcohol Use Never    Frequency: Never     Social History     Substance and Sexual Activity   Drug Use Never     Social History     Tobacco Use   Smoking Status Former Smoker    Packs/day: 1 00    Years: 30 00    Pack years: 30 00    Types: Cigarettes   Smokeless Tobacco Never Used   Tobacco Comment    quit 10 years ago       FAMILY HISTORY:  Family History   Problem Relation Age of Onset    Heart disease Father     No Known Problems Mother     Hypertension Father     Pulmonary embolism Father        MEDICATIONS:    Current Facility-Administered Medications:     acetaminophen (TYLENOL) tablet 650 mg, 650 mg, Oral, Q6H PRN, Jacy Chant, DPM    allopurinol (ZYLOPRIM) tablet 300 mg, 300 mg, Oral, QAM, Jacy Chant, DPM    amLODIPine (NORVASC) tablet 10 mg, 10 mg, Oral, Daily, PAT Maldonado    cefepime (MAXIPIME) 1,000 mg in dextrose 5 % 50 mL IVPB, 1,000 mg, Intravenous, Q12H, Daniel Avila MD, Last Rate: 100 mL/hr at 02/08/20 0524, 1,000 mg at 02/08/20 0524    ferrous gluconate (FERGON) tablet 324 mg, 324 mg, Oral, BID AC, Azalea Hinds, DPM, 324 mg at 02/08/20 0618    fish oil capsule 1,000 mg, 1,000 mg, Oral, BID, Jacy Chant, DPM, 1,000 mg at 02/07/20 1720    furosemide (LASIX) tablet 40 mg, 40 mg, Oral, BID (diuretic), Jacy Chant, DPM    heparin (porcine) subcutaneous injection 5,000 Units, 5,000 Units, Subcutaneous, Q8H [address], 5,000 Units at 02/08/20 0525 **AND** [CANCELED] Platelet count, , , Once, Jacy Chant, DPM    insulin lispro (HumaLOG) 100 units/mL subcutaneous injection 1-6 Units, 1-6 Units, Subcutaneous, TID AC **AND** Fingerstick Glucose (POCT), , , TID AC, Azalea Hinds, DPM    insulin lispro (HumaLOG) 100 units/mL subcutaneous injection 1-6 Units, 1-6 Units, Subcutaneous, HS, Azalea Hinds, DPM    LORazepam (ATIVAN) tablet 0 5 mg, 0 5 mg, Oral, Q8H PRN, Jacy Titus DPM   [START ON 2/14/2020] metolazone (ZAROXOLYN) tablet 5 mg, 5 mg, Oral, Once every 6 days, Tommye Hummer, DPM    metoprolol tartrate (LOPRESSOR) tablet 50 mg, 50 mg, Oral, Q12H [address], Azalea Hinds, DPM, 50 mg at 02/07/20 2127    multivitamin-minerals (CENTRUM) tablet 1 tablet, 1 tablet, Oral, Daily, Tommye Hummer, DPM, 1 tablet at 02/07/20 1954    oxyCODONE (ROXICODONE) IR tablet 5 mg, 5 mg, Oral, Q6H PRN, Tommye Hummer, DPM, 5 mg at 02/07/20 1824    pravastatin (PRAVACHOL) tablet 20 mg, 20 mg, Oral, Daily With Dinner, Tommye Hummer, DPM, 20 mg at 02/07/20 1720    tamsulosin (FLOMAX) capsule 0 4 mg, 0 4 mg, Oral, Daily With Dinner, Tommye Hummer, DPM, 0 4 mg at 02/07/20 1720    REVIEW OF SYSTEMS:  Constitutional: Negative for fatigue, anorexia, fever, chills, diaphoresis  HENT: Negative for postnasal drip  Eyes: Negative for visual disturbance  Respiratory: Negative for cough, shortness of breath and wheezing  Cardiovascular: Negative for chest pain, palpitations and leg swelling  Gastrointestinal: Negative for abdominal pain, constipation, diarrhea, nausea and vomiting  Genitourinary: No dysuria, hematuria  Endocrine: Negative for polyuria  Musculoskeletal: Negative for arthralgias, back pain and joint swelling  Skin: Negative for rash  Neurological: Negative for focal weakness, headaches, dizziness  Hematological: Negative for easy bruising or bleeding  Psychiatric/Behavioral: Negative for confusion and sleep disturbance  All the systems were reviewed and were negative except as documented on the HPI      PHYSICAL EXAM:  Current Weight:    First Weight:    Vitals:    02/07/20 2147 02/07/20 2230 02/07/20 2336 02/08/20 0728   BP:  137/62  143/60   BP Location:       Pulse:  67  73   Resp:    20   Temp:    98 °F (36 7 °C)   TempSrc:       SpO2: 92% 94% 95% 96%   Height:         No intake or output data in the 24 hours ending 02/08/20 0748  Physical Exam   Constitutional: He is oriented to person, place, and time  He appears well-developed and well-nourished  No distress  HENT:   Head: Normocephalic and atraumatic  Mouth/Throat: Oropharynx is clear and moist    Eyes: Conjunctivae and EOM are normal  No scleral icterus  Neck: Normal range of motion  Neck supple  No JVD present  No tracheal deviation present  Cardiovascular: Normal rate, regular rhythm and intact distal pulses  Exam reveals no gallop and no friction rub  No murmur heard  Pulmonary/Chest: Effort normal  No stridor  No respiratory distress  He has no wheezes  He has rales (Few crackles at the bases)  Abdominal: Soft  Bowel sounds are normal  He exhibits no distension and no mass  There is no tenderness  There is no rebound and no guarding  Musculoskeletal: Normal range of motion  He exhibits edema and deformity ( mild edema left foot in boot, wrapped)  Neurological: He is alert and oriented to person, place, and time  Skin: Skin is warm and dry  No rash noted  He is not diaphoretic  No erythema  No pallor  Psychiatric: He has a normal mood and affect   His behavior is normal  Judgment and thought content normal          Invasive Devices:      Lab Results:   Results from last 7 days   Lab Units 02/08/20  0523 02/07/20  1715   WBC Thousand/uL 9 29 12 38*   HEMOGLOBIN g/dL 7 5* 8 3*   HEMATOCRIT % 24 6* 26 6*   PLATELETS Thousands/uL 292 325   POTASSIUM mmol/L 5 0 5 3   CHLORIDE mmol/L 106 102   CO2 mmol/L 22 23   BUN mg/dL 66* 72*   CREATININE mg/dL 2 02* 2 05*   CALCIUM mg/dL 8 2* 8 6   ALK PHOS U/L  --  88   ALT U/L  --  8*   AST U/L  --  14     Other Studies:

## 2020-02-08 NOTE — ASSESSMENT & PLAN NOTE
· Maintained on amlodipine, metoprolol, and losartan  · Hold losartan for now with recent ELZBIETA  · Monitor VS

## 2020-02-08 NOTE — ASSESSMENT & PLAN NOTE
· Recent LEAD's on 1/13 with diffuse disease without focal stenosis  · Maintained on Lovaza and simvastatin

## 2020-02-08 NOTE — ASSESSMENT & PLAN NOTE
· Maintained on Metoprolol 50 mg BID and Eliquis  · Eliquis on hold for pending surgery  · Cardiology consulted

## 2020-02-08 NOTE — CONSULTS
Consult - Cardiology   Sandra Mares 75 y o  male MRN: [id number]  Unit/Bed#: [facility] 2 -01 Encounter: [id number]        Reason For Consult:                 Assessment and Plan:     · Admitted for wound dehiscence and possible osteomyelitis with planned I and D of the left foot  · Other problems as enumerated in discussed in the HPI    Recommendations:     - With no recent change in his cardiac status, his current risk remains acceptable and unchanged (intermediate)  Having twice recently tolerated similar podiatric surgery without complication we anticipate he again should do well with no advisement for interval testing   - He remains in a sinus rhythm with advisement for uninterrupted use of AV blocking agents as is feasible  Eliquis is being held for surgery and this should be resumed when acceptable from a surgical perspective   - Blood pressure currently seems acceptable on regimen of amlodipine, metoprolol, and Lasix ~~> losartan held by Nephrology and will continue to follow the BP trend   - with regard to CHF the patient seems rather compensated and near euvolemic ~~> continue current dose of Lasix following exam/symptoms/GFR as patient may need to resume a maintenance regimen of 80 mg b i d          History Of Present Illness:  Sandra Mares is a 69-year-old with medical history highlighted by the following:    #Chronic diastolic CHF:  Baseline weight has recently been around 211 lb    #Hx third-degree AV block - s/p Medtronic dual-chamber pacemaker    #Valvular heart disease with mild AS & AI, moderate mitral stenosis, mild MR, mild TR per [facility]ERT of 1/2020    #Paroxysmal atrial fibrillation:  Recently maintaining a sinus rhythm on regimen of AV blocking medications and Eliquis anticoagulation    # nonobstructive single-vessel CAD:  50% stenosis mLAD per cath 5/2019 (report below)    #Hypertension:  Historically controlled    #Dyslipidemia:  On a stat    #CKD:  Historically baseline creatinine 1 2-1 5 though recently plateaued closer to 2 0    #Diabetes    We had followed this patient during a recent hospitalization (last seen 1/26/2020)  At that time he had volume overload which was in large part noncardiogenic (related to CKD)  He was treated with a Bumex drip and slow improvement ultimately placed on furosemide 80 mg b i d which was higher than his previous home regimen  He had been maintaining a sinus rhythm and was anticoagulated with Eliquis  His blood pressure was well controlled on combination of beta-blocker and calcium channel blocker with ARB having been held in light of his worsening renal function  Transesophageal echocardiogram performed during that hospitalization 1/15/2020 because of bacteremia was without evidence of vegetation on the valves or pacing wires  The mitral valve showed marked calcification with moderate stenosis and mild insufficiency  There was mild AS & AI, as well as mild TR    Left great toe amputation 1/8/2020 followed by I&D with sesamoid removal-1/12/2020  Yesterday seen in the office of his podiatrist with signs wound dehiscence and probable osteomyelitis now anticipate I&D for which repeat risk stratification and assistance with management of chronic cardiac issues is requested  Since the patient's recent discharge she has had no new cardiac problems  Though he has some chronic effort dyspnea he has had no change from his baseline  His weight has been stable at approximately 210 lb and he has had no perceived cardiac ectopy  Prior to admission it seems he was taking furosemide 40 mg b i d  And may have again resumed use of angiotensin receptor blocker              Past Medical History:        Past Medical History:   Diagnosis Date    Arthritis     OA    Bruise of both arms     forearms and both hands    Bruises easily     CHF (congestive heart failure) (HCC)     Diabetes mellitus ([facility] [address] )     Diabetic foot ulcer [facility])     Eczema     Erectile dysfunction  Gout     Hyperlipidemia     Hypertension     Murmur     Nephropathy     Osteoarthritis     PAD (peripheral artery disease) (HCC)     Seasonal allergies     Toe infection     bilat great toes    Vertigo     Walks frequently     Wears dentures     upper    Wears glasses       Past Surgical History:   Procedure Laterality Date    CARDIAC PACEMAKER PLACEMENT      CARPAL TUNNEL RELEASE Left     CARPAL TUNNEL RELEASE Right     CARPAL TUNNEL RELEASE      INCISION AND DRAINAGE OF WOUND Left 1/12/2020    Procedure: INCISION AND DRAINAGE (I&D) EXTREMITY AND REMOVAL OF SESMOID BONE;  Surgeon: Keyla Maharaj DPM;  [facility];  Service: Podiatry    IR PICC REPO  1/14/2020    KNEE ARTHROSCOPY Left     KNEE ARTHROSCOPY Right     KNEE SURGERY      MN AMPUTATION TOE,I-P JT Bilateral 5/2/2017    Procedure: PARTIAL AMPUTATION RIGHT AND LEFT HALLUX ;  Surgeon: Oniel Caban DPM;  [facility];  Service: Podiatry    MN AMPUTATION TOE,MT-P JT Left 7/25/2017    Procedure: 2ND TOE AMPUTATION;  Surgeon: Oniel Caban DPM;  [facility];  Service: Podiatry    SHOULDER ARTHROSCOPY Left     with screws,RTC    SHOULDER SURGERY      TOE AMPUTATION Left 1/8/2020    Procedure: Katelin Player;  Surgeon: Keyla Maharaj DPM;  [facility];  Service: Podiatry    VASECTOMY          Allergy:        Allergies   Allergen Reactions    Invokana [Canagliflozin]     Lamisil [Terbinafine] Rash    Lamisil [Terbinafine] Blisters     Wife states " His skin peeled from head to toe"    Other Swelling     Pomegranate - facial swelling, no swelling of tongue, esophagus  Adhesive tape   Latex Rash       Medications:       Prior to Admission medications    Medication Sig Start Date End Date Taking?  Authorizing Provider   allopurinol (ZYLOPRIM) 300 mg tablet TAKE 1 TABLET (300 MG TOTAL) BY MOUTH EVERY MORNING 2/9/19  Yes Deep Nicholas,    amLODIPine (NORVASC) 10 mg tablet TAKE 1 TABLET BY MOUTH EVERY DAY 12/22/19  Yes Anna Hector MD   apixaban (ELIQUIS) 5 mg Take 1 tablet (5 mg total) by mouth every 12 (twelve) hours 9/25/19  Yes Reji Mi MD   ceFAZolin (ANCEF) 2000 mg IVPB Infuse 2,000 mg into a venous catheter every 8 (eight) hours for 23 days 1/15/20 2/7/20 Yes Melissa Alonso DO   Cholecalciferol (VITAMIN D-3) 1000 units CAPS Take 1 capsule by mouth every morning     Yes Historical Provider, MD   ferrous gluconate (FERGON) 324 mg tablet Take 1 tablet (324 mg total) by mouth 2 (two) times a day before meals 1/29/20  Yes Isabelle Sanchez   furosemide (LASIX) 40 mg tablet Take 1 tablet (40 mg total) by mouth 2 (two) times a day 11/27/19  Yes Reji Mi MD   glucose blood (ONE TOUCH ULTRA TEST) test strip 1 each by Other route 3 (three) times a day Use to test blood sugar 6/20/19  Yes PAT Bourgeois   Insulin Degludec-Liraglutide (Insulin Degludec-Liraglutide) 100 units-3 6 mg/mL injection pen Inject 19 Units under the skin daily   Yes Historical Provider, MD   LORazepam (ATIVAN) 0 5 mg tablet Take 1 tablet (0 5 mg total) by mouth every 8 (eight) hours as needed for anxiety 1/31/20  [facility]   losartan (COZAAR) 50 mg tablet Take 0 5 tablets (25 mg total) by mouth daily 5/23/19  Yes Reji Mi MD   metFORMIN (GLUCOPHAGE) 500 mg tablet Take 1 tablet (500 mg total) by mouth 2 (two) times a day with meals 2/5/20  Yes PAT Bourgeois   metoprolol tartrate (LOPRESSOR) 50 mg tablet Take 1 tablet (50 mg total) by mouth every 12 (twelve) hours 11/27/18  Yes Reji Mi MD   Misc   Devices (FREE SPIRIT KNEE/LEG WALKER) MISC by Does not apply route 3 (three) times a day 1/16/20  Yes Melissa Alonso DO   Multiple Vitamin (MULTIVITAMIN) tablet Take 1 tablet by mouth daily IN AM   Yes Historical Provider, MD   omega-3-acid ethyl esters (LOVAZA) 1 g capsule Take 2 capsules (2 g total) by mouth 2 (two) times a day 12/5/18  Yes Anna Hector MD   ONE TOUCH ULTRA TEST test strip USE TO TEST BLOOD SUGAR 3 TIMES A DAY 10/31/19  Yes Indiana Ren DO   simvastatin (ZOCOR) 20 mg tablet Take 1 tablet (20 mg total) by mouth daily at bedtime 11/27/18  Yes Isbael Sterling MD   SULFAMETHOXAZOLE-TRIMETHOPRIM PO Take 2 tablets by mouth 2 (two) times a day   Yes Historical Provider, MD   tamsulosin (FLOMAX) 0 4 mg Take 0 4 mg by mouth daily with dinner 9/25/19  Yes Historical Provider, MD   betamethasone valerate (VALISONE) 0 1 % cream as needed  10/1/18   Historical Provider, MD   Dupilumab, Asthma, ([place]) Inject under the skin     Historical Provider, MD   econazole nitrate 1 % cream Apply 1 application topically as needed To affected area 8/22/19   Historical Provider, MD   glucagon (GLUCAGON EMERGENCY) 1 MG injection Inject 1 mg under the skin once as needed for low blood sugar for up to 1 dose 3/8/19   PAT Naranjo   hydrOXYzine HCL (ATARAX) 10 mg tablet Take 10 mg by mouth every 6 (six) hours as needed   10/24/18   Historical Provider, MD   metolazone (ZAROXOLYN) 5 mg tablet Take 1 tablet (5 mg total) by mouth see administration instructions One tablet every 6 days prn and as directed by physician 5/23/19   Isabel Sterling MD   glimepiride (AMARYL) 4 mg tablet TAKE 1 TABLET BY MOUTH TWICE A DAY  Patient taking differently: TAKE 1 TABLET BY MOUTH IN THE AM AND 1/2 TABLET IN PM 10/30/18 5/24/19  Feng Smiley DO       Family History:     Family History   Problem Relation Age of Onset    Heart disease Father     No Known Problems Mother     Hypertension Father     Pulmonary embolism Father         Social History:       Social History     Socioeconomic History    Marital status: /Civil Union     Spouse name: Not on file    Number of children: Not on file    Years of education: Not on file    Highest education level: Not on file   Occupational History    Not on file   Social Needs    Financial resource strain: Not on file    Food insecurity:     Worry: Not on file Inability: Not on file    Transportation needs:     Medical: Not on file     Non-medical: Not on file   Tobacco Use    Smoking status: Former Smoker     Packs/day: 1 00     Years: 30 00     Pack years: 30 00     Types: Cigarettes    Smokeless tobacco: Never Used    Tobacco comment: quit 10 years ago   Substance and Sexual Activity    Alcohol use: Never     Frequency: Never    Drug use: Never    Sexual activity: Not on file   Lifestyle    Physical activity:     Days per week: Not on file     Minutes per session: Not on file    Stress: Not on file   Relationships    Social connections:     Talks on phone: Not on file     Gets together: Not on file     Attends [employer] service: Not on file     Active member of club or organization: Not on file     Attends meetings of clubs or organizations: Not on file     Relationship status: Not on file    Intimate partner violence:     Fear of current or ex partner: Not on file     Emotionally abused: Not on file     Physically abused: Not on file     Forced sexual activity: Not on file   Other Topics Concern    Not on file   Social History Narrative    ** Merged History Encounter **         Daily cola consumption (2 cans/day)       ROS:  Symptoms per HPI  Ambulates with a walker  Denies any orthopnea or PND   Recently stable weight approximately 210-211 lb  Remainder of the review of systems is negative    Exam:  General:  Alert, normally conversant, comfortable appearing  Head: Normocephalic, atraumatic  Eyes:  EOMI  Pupils - equal, round, reactive to accomodation  No icterus  Normal Conjunctiva  Oropharynx:  Moist without lesion  Neck:  No gross bruit, thyromegaly, or lymphadenopathy  Heart:  Regular with controlled rate  Grade 3 apical ASIF  Lungs:  Clear without rales/rhonchi/wheeze  Abdomen:  Soft and nontender with normal bowel sounds   No organomegaly or mass  Lower Limbs:  1+ edema of distal pretibial region and malleolar area  Pulses[de-identified]  RLE - DP:  1-2+ LLE - bandaged and not removed for evaluation   Musculoskeletal: Independent movement of limbs observed, Formal ROM and strength eval not performed  Neurologic:    Oriented to: person , place, situation  Cranial Nerves: grossly intact - vision, smell, taste, and hearing  were not tested  Motor function:grossly normal, symmetric   Sensation: Was not tested    Vitals:    02/07/20 2147 02/07/20 2230 02/07/20 2336 02/08/20 0728   BP:  137/62  143/60   BP Location:       Pulse:  67  73   Resp:    20   Temp:    98 °F (36 7 °C)   TempSrc:       SpO2: 92% 94% 95% 96%   Height:             DATA:      EKG  atrial sensed and paced ventricular rhythm at approximately 70 beats per minute    Ischemic Testing:  Left and right heart catheterization May 2019:  SUMMARY     CORONARY CIRCULATION:  Mid LAD: There was a 50 % stenosis      INDICATIONS:  --  Congestive heart failure      PROCEDURES PERFORMED     --  Left heart catheterization without ventriculogram   --  Left coronary angiography  --  Right coronary angiography  --  Inpatient  -- [facility] Same Physician Initial 15min  --  Coronary Catheterization (w/ LHC)     PROCEDURE: The risks and alternatives of the procedures and conscious sedation were explained to the patient and informed consent was obtained  The patient was brought to the cath lab and placed on the table  The planned puncture sites  were prepped and draped in the usual sterile fashion      --  Right radial artery access  After performing an Lars's test to verify adequate ulnar artery supply to the hand, the radial site was prepped  The puncture site was infiltrated with local anesthetic  The vessel was accessed using the  modified Seldinger technique, a wire was advanced into the vessel, and a sheath was advanced over the wire into the vessel      --  Left heart catheterization without ventriculogram  A catheter was advanced over a guidewire into the ascending aorta   After recording ascending aortic pressure, the catheter was advanced across the aortic valve and left ventricular  pressure was recorded  The catheter was pulled back across the aortic valve and into the ascending aorta and pullback pressures were obtained      --  Left coronary artery angiography  A catheter was advanced over a guidewire into the aorta and positioned in the left coronary artery ostium under fluoroscopic guidance  Angiography was performed      --  Right coronary artery angiography  A catheter was advanced over a guidewire into the aorta and positioned in the right coronary artery ostium under fluoroscopic guidance  Angiography was performed      --  Inpatient      --  Mod Sedation Same Physician Initial 15min      --  Coronary Catheterization (w/ LHC)     PROCEDURE COMPLETION: The patient tolerated the procedure well and was discharged from the cath lab  TIMING: Test concluded at 09:19  HEMOSTASIS: The sheath was removed  The site was compressed with a Hemoband device  Hemostasis was  obtained  MEDICATIONS GIVEN: Verapamil (Isoptin, Calan, Covera), 2 5 mg, IA, at 09:13  Fentanyl (1OOmcg/2 ml), 50 mcg, IV, at 09:07  Versed (2mg/2ml), 2 mg, IV, at 09:07  1% Lidocaine, 1 ml, subcutaneously, at 09:12  Heparin 1000 units/ml,  4,000 units, at 09:13  Nitroglycerin (200mcg/ml), 200 mcg, at 09:13  CONTRAST GIVEN: 75 ml Omnipaque (350mg I /ml)  RADIATION EXPOSURE: Fluoroscopy time: 0 98 min  Fluoroscopy dose: 19 175 Thrasher      HEMODYNAMICS: Hemodynamic assessment demonstrated normal LVEDP      CORONARY VESSELS:   --  The coronary circulation is right dominant  --  Left main: Angiography showed minor luminal irregularities  --  Mid LAD: There was a 50 % stenosis  --  Circumflex: Angiography showed minor luminal irregularities  --  RCA: Angiography showed minor luminal irregularities      IMPRESSIONS:  No significant obstructive epicardial CAD   Normal LVEDP     RECOMMENDATIONS  follow up narinder     DISPOSITION:  The patient left the catheterization laboratory in stable condition      Prepared and signed Mei Hollis DO  Signed 05/20/2019 09:24:15           Weights: Wt Readings from Last 3 Encounters:   02/07/20 95 8 kg (211 lb 3 2 oz)   01/29/20 94 5 kg (208 lb 5 4 oz)   12/13/19 96 2 kg (212 lb)   , Body mass index is 30 3 kg/m²           Lab Studies:             Results from last 7 days   Lab Units 02/08/20  0523 02/07/20  1715   WBC Thousand/uL 9 29 12 38*   HEMOGLOBIN g/dL 7 5* 8 3*   HEMATOCRIT % 24 6* 26 6*   PLATELETS Thousands/uL 292 325   ,   Results from last 7 days   Lab Units 02/08/20  0523 02/07/20  1715   POTASSIUM mmol/L 5 0 5 3   CHLORIDE mmol/L 106 102   CO2 mmol/L 22 23   BUN mg/dL 66* 72*   CREATININE mg/dL 2 02* 2 05*   CALCIUM mg/dL 8 2* 8 6   ALK PHOS U/L  --  88   ALT U/L  --  8*   AST U/L  --  14

## 2020-02-08 NOTE — SOCIAL WORK
Cm met with pt who confirmed he was open with [facility] [employer] [facility] for SN (wound care)- inquiry made if wanting to continue with agency on d/c if to be to home which he requests a referral back to them- done as requested  Will continue to follow to assist with dc needs/disposition

## 2020-02-08 NOTE — ASSESSMENT & PLAN NOTE
· On iron supplementation  · Monitor H&H  · Could be contributing to shortness of breath  · May need tranfusion prior to surgery  Lab Results   Component Value Date    HGB 7 5 (L) 02/08/2020    HGB 8 3 (L) 02/07/2020    HGB 8 0 (L) 01/28/2020

## 2020-02-08 NOTE — ASSESSMENT & PLAN NOTE
Wt Readings from Last 3 Encounters:   02/07/20 95 8 kg (211 lb 3 2 oz)   01/29/20 94 5 kg (208 lb 5 4 oz)   12/13/19 96 2 kg (212 lb)     · Maintained on Lasix 40 mg BID and Zaroxolyn as needed  · Patient's weight is up slightly    He has had progressive dyspnea however is also very deconditioned from recent hospitalization  · Recent echo performed  · CXR / BNP ordered  · Daily weight / I&O  · Cardiology consulted for recommendations prior to surgery

## 2020-02-08 NOTE — PLAN OF CARE
Problem: Potential for Falls  Goal: Patient will remain free of falls  Description  INTERVENTIONS:  - Assess patient frequently for physical needs  -  Identify cognitive and physical deficits and behaviors that affect risk of falls    -  [place] fall precautions as indicated by assessment   - Educate patient/family on patient safety including physical limitations  - Instruct patient to call for assistance with activity based on assessment  - Modify environment to reduce risk of injury  - Consider OT/PT consult to assist with strengthening/mobility  Outcome: Progressing     Problem: INFECTION - ADULT  Goal: Absence or prevention of progression during hospitalization  Description  INTERVENTIONS:  - Assess and monitor for signs and symptoms of infection  - Monitor lab/diagnostic results  - Monitor all insertion sites, i e  indwelling lines, tubes, and drains  - Monitor endotracheal if appropriate and nasal secretions for changes in amount and color  - [place] appropriate cooling/warming therapies per order  - Administer medications as ordered  - Instruct and encourage patient and family to use good hand hygiene technique  - Identify and instruct in appropriate isolation precautions for identified infection/condition  Outcome: Progressing     Problem: SAFETY ADULT  Goal: Maintain or return to baseline ADL function  Description  INTERVENTIONS:  -  Assess patient's ability to carry out ADLs; assess patient's baseline for ADL function and identify physical deficits which impact ability to perform ADLs (bathing, care of mouth/teeth, toileting, grooming, dressing, etc )  - Assess/evaluate cause of self-care deficits   - Assess range of motion  - Assess patient's mobility; develop plan if impaired  - Assess patient's need for assistive devices and provide as appropriate  - Encourage maximum independence but intervene and supervise when necessary  - Involve family in performance of ADLs  - Assess for home care needs following discharge   - Consider OT consult to assist with ADL evaluation and planning for discharge  - Provide patient education as appropriate  Outcome: Progressing  Goal: Maintain or return mobility status to optimal level  Description  INTERVENTIONS:  - Assess patient's baseline mobility status (ambulation, transfers, stairs, etc )    - Identify cognitive and physical deficits and behaviors that affect mobility  - Identify mobility aids required to assist with transfers and/or ambulation (gait belt, sit-to-stand, lift, walker, cane, etc )  - [place] fall precautions as indicated by assessment  - Record patient progress and toleration of activity level on Mobility SBAR; progress patient to next Phase/Stage  - Instruct patient to call for assistance with activity based on assessment  - Consider rehabilitation consult to assist with strengthening/weightbearing, etc   Outcome: Progressing     Problem: DISCHARGE PLANNING  Goal: Discharge to home or other facility with appropriate resources  Description  INTERVENTIONS:  - Identify barriers to discharge w/patient and caregiver  - Arrange for needed discharge resources and transportation as appropriate  - Identify discharge learning needs (meds, wound care, etc )  - Arrange for interpretive services to assist at discharge as needed  - Refer to Case Management Department for coordinating discharge planning if the patient needs post-hospital services based on physician/advanced practitioner order or complex needs related to functional status, cognitive ability, or social support system  Outcome: Progressing     Problem: SKIN/TISSUE INTEGRITY - ADULT  Goal: Skin integrity remains intact  Description  INTERVENTIONS  - Identify patients at risk for skin breakdown  - Assess and monitor skin integrity  - Assess and monitor nutrition and hydration status  - Monitor labs (i e  albumin)  - Assess for incontinence   - Turn and reposition patient  - Assist with mobility/ambulation  - Relieve pressure over bony prominences  - Avoid friction and shearing  - Provide appropriate hygiene as needed including keeping skin clean and dry  - Evaluate need for skin moisturizer/barrier cream  - Collaborate with interdisciplinary team (i e  Nutrition, Rehabilitation, etc )   - Patient/family teaching  Outcome: Progressing  Goal: Incision(s), wounds(s) or drain site(s) healing without S/S of infection  Description  INTERVENTIONS  - Assess and document risk factors for skin impairment   - Assess and document dressing, incision, wound bed, drain sites and surrounding tissue  - Consider nutrition services referral as needed  - Oral mucous membranes remain intact  - Provide patient/ family education  Outcome: Progressing  Goal: Oral mucous membranes remain intact  Description  INTERVENTIONS  - Assess oral mucosa and hygiene practices  - Implement preventative oral hygiene regimen  - Implement oral medicated treatments as ordered  - Initiate Nutrition services referral as needed  Outcome: Progressing

## 2020-02-08 NOTE — CONSULTS
[facility] Internal Medicine  Consult- Germaine Esquivel 1955, 75 y o  male MRN: [id number]    Unit/Bed#: [facility] [address] [place] 87 [id number] Encounter: [id number]    Primary Care Provider: Brea Garcia DO   Date and time admitted to hospital: 2/7/2020  4:09 PM      Inpatient consult to Internal Medicine  Consult performed by: PAT Torrez  Consult ordered by: Alysa Cook DPM          * Diabetes mellitus with neuropathy [facility])  Assessment & Plan  Lab Results   Component Value Date    HGBA1C 6 6 02/03/2020       Recent Labs     02/07/20  2105 02/07/20  2136 02/08/20  0733 02/08/20  1055   POCGLU 83 135 82 120       Blood Sugar Average: Last 72 hrs:  (P) 105 4   · Maintained on Xultophy (combination drug with basal insulin) 19 units daily, Metformin 500 mg BID  · Hold oral agent while here  · Monitor FSBS ac / hs and cover with SSI  No basal at this time, euglycemic  Adjust insulin doses as needed  Osteomyelitis of foot, left, acute ([facility] [address] )  Assessment & Plan  · Podiatry primary team  · With recent hospitalization, S/P left hallux amputation (DOS: 1/8/20) and S/P left foot I&D with removal of sesamoids (DOS: 1/12/20) and now with wound dehiscence  Will likely need left partial 1st ray amputation this admission  · Had MSSA bacteremia, treated until yesterday with IV Ancef  PICC in place  Repeat blood cultures to be performed after 2/20 to ensure eradication  · ID consulted  · Currently on Cefepime  Leukocytosis on arrival, now resolved  Afebrile  · Bone scan pending    Stage 3 chronic kidney disease (HCC)  Assessment & Plan  · Baseline variable, most recent 1 9-2 since last admission  · Avoid nephrotoxins / hypotension with recent ELZBIETA  Losartan on hold  Zaroxolyn discontinued  Continue Lasix for history of flash pulmonary edema    · UA ordered  · Monitor BMP / I&O / daily weights  · Nephrology following  Lab Results   Component Value Date    CREATININE 2 02 (H) 02/08/2020    CREATININE 2 05 (H) 02/07/2020 CREATININE 1 88 (H) 01/29/2020         Acute diastolic (congestive) heart failure (HCC)  Assessment & Plan  Wt Readings from Last 3 Encounters:   02/07/20 95 8 kg (211 lb 3 2 oz)   01/29/20 94 5 kg (208 lb 5 4 oz)   12/13/19 96 2 kg (212 lb)     · Maintained on Lasix 40 mg BID and Zaroxolyn as needed  · Patient's weight is up slightly    He has had progressive dyspnea however is also very deconditioned from recent hospitalization  · Recent echo performed  · CXR / BNP ordered  · Daily weight / I&O  · Cardiology consulted for recommendations prior to surgery        Peripheral arterial disease ([facility] )  Assessment & Plan  · Recent LEAD's on 1/13 with diffuse disease without focal stenosis  · Maintained on Lovaza and simvastatin    Anxiety  Assessment & Plan  · Maintained on Ativan as needed  · Continue while here    Iron deficiency anemia  Assessment & Plan  · On iron supplementation  · Monitor H&H  · Could be contributing to shortness of breath  · May need tranfusion prior to surgery  Lab Results   Component Value Date    HGB 7 5 (L) 02/08/2020    HGB 8 3 (L) 02/07/2020    HGB 8 0 (L) 01/28/2020         Essential hypertension  Assessment & Plan  · Maintained on amlodipine, metoprolol, and losartan  · Hold losartan for now with recent ELZBIETA  · Monitor VS    PAF (paroxysmal atrial fibrillation) ([facility])  Assessment & Plan  · Maintained on Metoprolol 50 mg BID and Eliquis  · Eliquis on hold for pending surgery  · Cardiology consulted    Complete heart block ([facility] )  Assessment & Plan  · S/p pacemaker implantation  · Cardiology following    Hyperlipidemia  Assessment & Plan  · Maintained on simvastatin and Lovaza  · Continue    Gout  Assessment & Plan  · Maintained on Allopurinol    Diabetic foot ulcer [facility])  Assessment & Plan  Lab Results   Component Value Date    HGBA1C 6 6 02/03/2020       Recent Labs     02/07/20  2105 02/07/20  2136 02/08/20  0733 02/08/20  1055   POCGLU 83 135 82 120       Blood Sugar Average: Last 72 hrs:  (P) 105 4          VTE Prophylaxis: Heparin  / sequential compression device     Recommendations for Discharge:  · Pending, to be determined    Counseling / Coordination of Care Time: 1 hour  Greater than 50% of total time spent on patient counseling and coordination of care  Collaboration of Care: Were Recommendations Directly Discussed with Primary Treatment Team? - No     History of Present Illness:    Shaina Toribio is a 75 y o  male who is originally admitted to the Podiatry service due to possible osteomyelitis  We are consulted for medical management  He has an extensive medical history to include hypertension, hyperlipidemia, heart failure, complete heart block s/p pacemaker implantation, atrial fibrillation, peripheral arterial disease, benign prostatic hypertrophy, gout, eczema, chronic kidney disease, iron deficiency anemia, neuropathy, diabetes mellitus, and recent MSSA bacteremia  He was recently hospitalized for bacteremia and had left hallux amputation  He had just completed his course of Ancef  He was following up and found to have possible osteomyelitis and sent for admission  He is currently on Cefepime  ID, Cardiology and Nephrology have been consulted as well to assist in the care of this complex patient  His only complaint is ongoing dyspnea which he attributes more to deconditioning  He has been compliant with his medication regimen  His Eliquis is on hold for pending surgery  His Losartan is on hold for recent ELZBIETA  He has been euglycemic and at this time is just requiring sliding scale insulin coverage and ADA diet is ordered  A CXR is ordered  His creatinine appears to be at his new baseline  His hemoglobin is 7 5 and may need transfusion  We will continue to monitor daily labs / weights / I&O  We appreciate the consult and will continue to follow along throughout the duration of the patient's stay      Review of Systems:    Review of Systems   Constitutional: Negative for activity change, appetite change, chills, fatigue and fever  HENT: Negative for congestion and sore throat  Respiratory: Positive for shortness of breath  Negative for cough  Cardiovascular: Negative for chest pain and palpitations  Gastrointestinal: Negative for abdominal distention, abdominal pain, constipation, diarrhea, nausea and vomiting  Genitourinary: Negative for difficulty urinating, dysuria, frequency and urgency  Musculoskeletal: Negative for arthralgias and joint swelling  Skin: Negative for rash  Neurological: Negative for dizziness, seizures, syncope, weakness, numbness and headaches  Psychiatric/Behavioral: The patient is nervous/anxious  All other systems reviewed and are negative        Past Medical and Surgical History:     Past Medical History:   Diagnosis Date    Arthritis     OA    Bruise of both arms     forearms and both hands    Bruises easily     CHF (congestive heart failure) ([facility] [address] )     Diabetes mellitus ([facility] [address] )     Diabetic foot ulcer ([facility] [address] )     Eczema     Erectile dysfunction     Gout     Hyperlipidemia     Hypertension     Murmur     Nephropathy     Osteoarthritis     PAD (peripheral artery disease) (HCC)     Seasonal allergies     Toe infection     bilat great toes    Vertigo     Walks frequently     Wears dentures     upper    Wears glasses        Past Surgical History:   Procedure Laterality Date    CARDIAC PACEMAKER PLACEMENT      CARPAL TUNNEL RELEASE Left     CARPAL TUNNEL RELEASE Right     CARPAL TUNNEL RELEASE      INCISION AND DRAINAGE OF WOUND Left 1/12/2020    Procedure: INCISION AND DRAINAGE (I&D) EXTREMITY AND REMOVAL OF SESMOID BONE;  Surgeon: Yenny Casas DPM;  [facility];  Service: Podiatry    IR PICC REPO  1/14/2020    KNEE ARTHROSCOPY Left     KNEE ARTHROSCOPY Right     KNEE SURGERY      AL AMPUTATION TOE,I-P JT Bilateral 5/2/2017    Procedure: PARTIAL AMPUTATION RIGHT AND LEFT HALLUX ;  Surgeon: Melba Guerra LIZBETH;  [facility];  Service: Podiatry    VT AMPUTATION TOE,MT-P JT Left 7/25/2017    Procedure: 2ND TOE AMPUTATION;  Surgeon: Divina Lopes DPM;  [facility];  Service: Podiatry    SHOULDER ARTHROSCOPY Left     with screws,RTC    SHOULDER SURGERY      TOE AMPUTATION Left 1/8/2020    Procedure: HALLUX AMPUTATION;  Surgeon: Adelina Ellison DPM;  [facility];  Service: Podiatry    VASECTOMY         Meds/Allergies:    all medications and allergies reviewed    Allergies: Allergies   Allergen Reactions    Invokana [Canagliflozin]     Lamisil [Terbinafine] Rash    Lamisil [Terbinafine] Blisters     Wife states " His skin peeled from head to toe"    Other Swelling     Pomegranate - facial swelling, no swelling of tongue, esophagus  Adhesive tape   Latex Rash       Social History:     Marital Status: /Civil Union    Substance Use History:   Social History     Substance and Sexual Activity   Alcohol Use Never    Frequency: Never     Social History     Tobacco Use   Smoking Status Former Smoker    Packs/day: 1 00    Years: 30 00    Pack years: 30 00    Types: Cigarettes   Smokeless Tobacco Never Used   Tobacco Comment    quit 10 years ago     Social History     Substance and Sexual Activity   Drug Use Never       Family History:    Family History   Problem Relation Age of Onset    Heart disease Father     No Known Problems Mother     Hypertension Father     Pulmonary embolism Father        Physical Exam:     Vitals:   Blood Pressure: 143/60 (02/08/20 0728)  Pulse: 73 (02/08/20 0728)  Temperature: 98 °F (36 7 °C) (02/08/20 0728)  Temp Source: Oral (02/07/20 1612)  Respirations: 20 (02/08/20 0728)  Height: 5' 10" (177 8 cm) (02/07/20 1640)  SpO2: 96 % (02/08/20 0728)    Physical Exam   Constitutional: He is oriented to person, place, and time  He appears well-developed and well-nourished  No distress  HENT:   Head: Normocephalic and atraumatic     Eyes: Conjunctivae are normal  No scleral icterus  Cardiovascular: Normal rate, regular rhythm and normal heart sounds  No murmur heard  Pulmonary/Chest: Effort normal and breath sounds normal  No respiratory distress  He has no wheezes  He has no rales  Abdominal: Soft  Bowel sounds are normal  He exhibits no distension  There is no tenderness  Musculoskeletal: Normal range of motion  He exhibits edema  He exhibits no tenderness  Neurological: He is alert and oriented to person, place, and time  Skin: Skin is warm and dry  He is not diaphoretic  Dressing on left foot  Psychiatric: He has a normal mood and affect  His behavior is normal    Nursing note and vitals reviewed  Additional Data:     Lab Results: I have personally reviewed pertinent reports  Results from last 7 days   Lab Units 02/08/20  0523   WBC Thousand/uL 9 29   HEMOGLOBIN g/dL 7 5*   HEMATOCRIT % 24 6*   PLATELETS Thousands/uL 292   NEUTROS PCT % 62   LYMPHS PCT % 13*   MONOS PCT % 10   EOS PCT % 14*     Results from last 7 days   Lab Units 02/08/20  0523 02/07/20  1715   SODIUM mmol/L 137 134*   POTASSIUM mmol/L 5 0 5 3   CHLORIDE mmol/L 106 102   CO2 mmol/L 22 23   BUN mg/dL 66* 72*   CREATININE mg/dL 2 02* 2 05*   ANION GAP mmol/L 9 9   CALCIUM mg/dL 8 2* 8 6   ALBUMIN g/dL  --  2 3*   TOTAL BILIRUBIN mg/dL  --  0 33   ALK PHOS U/L  --  88   ALT U/L  --  8*   AST U/L  --  14   GLUCOSE RANDOM mg/dL 76 101             Lab Results   Component Value Date/Time    HGBA1C 6 6 02/03/2020    HGBA1C 6 4 (H) 01/07/2020 12:07 PM    HGBA1C 6 1 09/19/2019 09:03 AM    HGBA1C 6 4 (H) 06/12/2019 09:14 AM    HGBA1C 10 4 (H) 12/12/2015 05:00 AM    HGBA1C 9 6 (H) 05/20/2015 12:00 AM    HGBA1C 7 5 (H) 02/21/2014 10:08 PM     Results from last 7 days   Lab Units 02/08/20  1055 02/08/20  0733 02/07/20  2136 02/07/20  2105 02/07/20  1724   POC GLUCOSE mg/dl 120 82 135 83 107           Imaging: I have personally reviewed pertinent reports        XR foot 3+ vw left (Results Pending)   NM inpatient order    (Results Pending)   XR chest pa & lateral    (Results Pending)       EKG, Pathology, and Other Studies Reviewed on Admission:   · EKG: None    ** Please Note: This note has been constructed using a voice recognition system   **

## 2020-02-08 NOTE — ASSESSMENT & PLAN NOTE
Lab Results   Component Value Date    HGBA1C 6 6 02/03/2020       Recent Labs     02/07/20  2105 02/07/20  2136 02/08/20  0733 02/08/20  1055   POCGLU 83 135 82 120       Blood Sugar Average: Last 72 hrs:  (P) 105 4   · Maintained on Xultophy (combination drug with basal insulin) 19 units daily, Metformin 500 mg BID  · Hold oral agent while here  · Monitor FSBS ac / hs and cover with SSI  No basal at this time, euglycemic  Adjust insulin doses as needed

## 2020-02-08 NOTE — ASSESSMENT & PLAN NOTE
· Podiatry primary team  · With recent hospitalization, S/P left hallux amputation (DOS: 1/8/20) and S/P left foot I&D with removal of sesamoids (DOS: 1/12/20) and now with wound dehiscence  Will likely need left partial 1st ray amputation this admission  · Had MSSA bacteremia, treated until yesterday with IV Ancef  PICC in place  Repeat blood cultures to be performed after 2/20 to ensure eradication  · ID consulted  · Currently on Cefepime  Leukocytosis on arrival, now resolved  Afebrile    · Bone scan pending

## 2020-02-08 NOTE — ASSESSMENT & PLAN NOTE
· Baseline variable, most recent 1 9-2 since last admission  · Avoid nephrotoxins / hypotension with recent ELZBIETA  Losartan on hold  Zaroxolyn discontinued  Continue Lasix for history of flash pulmonary edema    · UA ordered  · Monitor BMP / I&O / daily weights  · Nephrology following  Lab Results   Component Value Date    CREATININE 2 02 (H) 02/08/2020    CREATININE 2 05 (H) 02/07/2020    CREATININE 1 88 (H) 01/29/2020

## 2020-02-08 NOTE — PROGRESS NOTES
Progress Note - Podiatry  Concepcion Jason 75 y o  male MRN: [id number]  Unit/Bed#: [place] [id number] Encounter: [id number]    Assessment:  1  Left foot surgical wound dehiscence with suspected underlying OM  - S/P left hallux amputation (DOS: 1/8/20)  - S/P left foot I&D with removal of sesamoids (DOS: 1/12/20)  2  DM type 2 with neuropathy  3  Acute respiratory failure  4  Complete heart block - in situ permanent pacemaker  5  PAF  6  Acute diastolic congestive heart failure  7  CKD stage 3  8  Hx of Staph aureus bacteremia    Plan:  - left foot wound clinically appears stable with serosanguineous drainage  Dressed with Betadine dresser dressings  Patient will require at least a left partial 1st ray amputation during this admission pending Ceretec white blood cell scan  - continue nonweightbearing to the left lower extremity  - appreciate ID for antibiotic recommendations  Continue IV cefepime  - appreciate cardiology for management of acute diastolic congestive heart failure, history of 3rd degree AV block-in situ permanent pacemaker  Appreciate preop clearance  - appreciate slim for medical management  - appreciate Nephrology for CKD stage 3 management      Subjective/Objective   Chief Complaint: No chief complaint on file  Subjective: 75 y o  y/o male was seen and evaluated at bedside  Patient denies any acute events overnight  Blood pressure 143/60, pulse 73, temperature 98 °F (36 7 °C), resp  rate 20, height 5' 10" (1 778 m), SpO2 96 %  ,Body mass index is 30 3 kg/m²  Invasive Devices     Peripherally Inserted Central Catheter Line            PICC Line 88/92/30 Right Basilic 25 days                Physical Exam:   General: Alert, cooperative and no distress  Lungs: Non labored breathing  Heart: Positive S1, S2  Abdomen: Soft, non-tender  Extremity:  Left foot wounds appears clinically stable with serosanguineous drainage noted  Proximal wound measures 2 3x0 4x0 5 probable to bone    Wound bed appears mainly granular with some fibrosis  Mild rolled edges noted  Distal wound measures 0 3x0  3x1 0, probable to bone with fibrotic wound base  Macerated wound edges  Serosanguineous drainage noted  Erythema present from forefoot extending to the midfoot  Neurovascular and musculoskeletal status at baseline  2/8        Lab, Imaging and other studies:   CBC:   Lab Results   Component Value Date    WBC 9 29 02/08/2020    HGB 7 5 (L) 02/08/2020    HCT 24 6 (L) 02/08/2020    MCV 93 02/08/2020     02/08/2020    MCH 28 4 02/08/2020    MCHC 30 5 (L) 02/08/2020    RDW 14 6 02/08/2020    MPV 9 8 02/08/2020    NRBC 0 02/08/2020   , CMP:   Lab Results   Component Value Date    SODIUM 137 02/08/2020    K 5 0 02/08/2020     02/08/2020    CO2 22 02/08/2020    BUN 66 (H) 02/08/2020    CREATININE 2 02 (H) 02/08/2020    CALCIUM 8 2 (L) 02/08/2020    AST 14 02/07/2020    ALT 8 (L) 02/07/2020    ALKPHOS 88 02/07/2020    EGFR 33 02/08/2020       Imaging: I have personally reviewed pertinent films in PACS  EKG, Pathology, and Other Studies: I have personally reviewed pertinent reports

## 2020-02-09 LAB
ANION GAP SERPL CALCULATED.3IONS-SCNC: 9 MMOL/L (ref 4–13)
BASOPHILS # BLD AUTO: 0.08 THOUSANDS/ΜL (ref 0–0.1)
BASOPHILS NFR BLD AUTO: 1 % (ref 0–1)
BUN SERPL-MCNC: 57 MG/DL (ref 5–25)
CALCIUM SERPL-MCNC: 8.7 MG/DL (ref 8.3–10.1)
CHLORIDE SERPL-SCNC: 105 MMOL/L (ref 100–108)
CO2 SERPL-SCNC: 22 MMOL/L (ref 21–32)
CREAT SERPL-MCNC: 1.93 MG/DL (ref 0.6–1.3)
EOSINOPHIL # BLD AUTO: 1.32 THOUSAND/ΜL (ref 0–0.61)
EOSINOPHIL NFR BLD AUTO: 13 % (ref 0–6)
ERYTHROCYTE [DISTWIDTH] IN BLOOD BY AUTOMATED COUNT: 14.6 % (ref 11.6–15.1)
GFR SERPL CREATININE-BSD FRML MDRD: 35 ML/MIN/1.73SQ M
GLUCOSE SERPL-MCNC: 139 MG/DL (ref 65–140)
GLUCOSE SERPL-MCNC: 145 MG/DL (ref 65–140)
GLUCOSE SERPL-MCNC: 173 MG/DL (ref 65–140)
GLUCOSE SERPL-MCNC: 94 MG/DL (ref 65–140)
GLUCOSE SERPL-MCNC: 98 MG/DL (ref 65–140)
HCT VFR BLD AUTO: 25.2 % (ref 36.5–49.3)
HGB BLD-MCNC: 7.8 G/DL (ref 12–17)
IMM GRANULOCYTES # BLD AUTO: 0.02 THOUSAND/UL (ref 0–0.2)
IMM GRANULOCYTES NFR BLD AUTO: 0 % (ref 0–2)
LYMPHOCYTES # BLD AUTO: 0.99 THOUSANDS/ΜL (ref 0.6–4.47)
LYMPHOCYTES NFR BLD AUTO: 10 % (ref 14–44)
MCH RBC QN AUTO: 29.1 PG (ref 26.8–34.3)
MCHC RBC AUTO-ENTMCNC: 31 G/DL (ref 31.4–37.4)
MCV RBC AUTO: 94 FL (ref 82–98)
MONOCYTES # BLD AUTO: 0.92 THOUSAND/ΜL (ref 0.17–1.22)
MONOCYTES NFR BLD AUTO: 9 % (ref 4–12)
NEUTROPHILS # BLD AUTO: 6.55 THOUSANDS/ΜL (ref 1.85–7.62)
NEUTS SEG NFR BLD AUTO: 67 % (ref 43–75)
NRBC BLD AUTO-RTO: 0 /100 WBCS
PLATELET # BLD AUTO: 297 THOUSANDS/UL (ref 149–390)
PMV BLD AUTO: 10 FL (ref 8.9–12.7)
POTASSIUM SERPL-SCNC: 5.1 MMOL/L (ref 3.5–5.3)
RBC # BLD AUTO: 2.68 MILLION/UL (ref 3.88–5.62)
SODIUM SERPL-SCNC: 136 MMOL/L (ref 136–145)
WBC # BLD AUTO: 9.88 THOUSAND/UL (ref 4.31–10.16)

## 2020-02-09 PROCEDURE — 82948 REAGENT STRIP/BLOOD GLUCOSE: CPT

## 2020-02-09 PROCEDURE — 99232 SBSQ HOSP IP/OBS MODERATE 35: CPT | Performed by: INTERNAL MEDICINE

## 2020-02-09 PROCEDURE — 99233 SBSQ HOSP IP/OBS HIGH 50: CPT | Performed by: INTERNAL MEDICINE

## 2020-02-09 PROCEDURE — 80048 BASIC METABOLIC PNL TOTAL CA: CPT | Performed by: STUDENT IN AN ORGANIZED HEALTH CARE EDUCATION/TRAINING PROGRAM

## 2020-02-09 PROCEDURE — 94660 CPAP INITIATION&MGMT: CPT

## 2020-02-09 PROCEDURE — 85025 COMPLETE CBC W/AUTO DIFF WBC: CPT | Performed by: STUDENT IN AN ORGANIZED HEALTH CARE EDUCATION/TRAINING PROGRAM

## 2020-02-09 RX ADMIN — FUROSEMIDE 40 MG: 40 TABLET ORAL at 16:37

## 2020-02-09 RX ADMIN — INSULIN LISPRO 1 UNITS: 100 INJECTION, SOLUTION INTRAVENOUS; SUBCUTANEOUS at 16:40

## 2020-02-09 RX ADMIN — ALLOPURINOL 300 MG: 100 TABLET ORAL at 09:04

## 2020-02-09 RX ADMIN — CEFEPIME HYDROCHLORIDE 1000 MG: 1 INJECTION, POWDER, FOR SOLUTION INTRAMUSCULAR; INTRAVENOUS at 05:29

## 2020-02-09 RX ADMIN — Medication 1 TABLET: at 09:04

## 2020-02-09 RX ADMIN — FERROUS GLUCONATE 324 MG: 324 TABLET ORAL at 16:37

## 2020-02-09 RX ADMIN — HEPARIN SODIUM 5000 UNITS: 5000 INJECTION INTRAVENOUS; SUBCUTANEOUS at 14:21

## 2020-02-09 RX ADMIN — CEFEPIME HYDROCHLORIDE 1000 MG: 1 INJECTION, POWDER, FOR SOLUTION INTRAMUSCULAR; INTRAVENOUS at 16:41

## 2020-02-09 RX ADMIN — HEPARIN SODIUM 5000 UNITS: 5000 INJECTION INTRAVENOUS; SUBCUTANEOUS at 21:21

## 2020-02-09 RX ADMIN — PRAVASTATIN SODIUM 20 MG: 20 TABLET ORAL at 16:37

## 2020-02-09 RX ADMIN — HEPARIN SODIUM 5000 UNITS: 5000 INJECTION INTRAVENOUS; SUBCUTANEOUS at 05:24

## 2020-02-09 RX ADMIN — AMLODIPINE BESYLATE 10 MG: 10 TABLET ORAL at 09:04

## 2020-02-09 RX ADMIN — METOPROLOL TARTRATE 50 MG: 50 TABLET, FILM COATED ORAL at 21:22

## 2020-02-09 RX ADMIN — TAMSULOSIN HYDROCHLORIDE 0.4 MG: 0.4 CAPSULE ORAL at 16:37

## 2020-02-09 RX ADMIN — FUROSEMIDE 40 MG: 40 TABLET ORAL at 09:04

## 2020-02-09 RX ADMIN — Medication 1000 MG: at 17:00

## 2020-02-09 RX ADMIN — FERROUS GLUCONATE 324 MG: 324 TABLET ORAL at 09:05

## 2020-02-09 RX ADMIN — METOPROLOL TARTRATE 50 MG: 50 TABLET, FILM COATED ORAL at 09:05

## 2020-02-09 RX ADMIN — Medication 1000 MG: at 09:04

## 2020-02-09 RX ADMIN — LORAZEPAM 0.5 MG: 0.5 TABLET ORAL at 21:21

## 2020-02-09 NOTE — ASSESSMENT & PLAN NOTE
Wt Readings from Last 3 Encounters:   02/09/20 96 5 kg (212 lb 11 9 oz)   02/07/20 95 8 kg (211 lb 3 2 oz)   01/29/20 94 5 kg (208 lb 5 4 oz)     · Maintained on Lasix 40 mg BID and Zaroxolyn as needed  · Patient's weight is up slightly  He has had progressive dyspnea however is also very deconditioned from recent hospitalization  · Recent echo performed  · CXR with mild interstitial edema, small left effusion with left base atelectasis  Incentive spirometer ordered  · BNP elevated at 2,811  · Daily weight / I&O  · Cardiology consulted for recommendations prior to surgery  Continuing home regimen of Lasix 40 mg po BID

## 2020-02-09 NOTE — PLAN OF CARE
Problem: Potential for Falls  Goal: Patient will remain free of falls  Description  INTERVENTIONS:  - Assess patient frequently for physical needs  -  Identify cognitive and physical deficits and behaviors that affect risk of falls    -  [place] fall precautions as indicated by assessment   - Educate patient/family on patient safety including physical limitations  - Instruct patient to call for assistance with activity based on assessment  - Modify environment to reduce risk of injury  - Consider OT/PT consult to assist with strengthening/mobility  Outcome: Progressing     Problem: INFECTION - ADULT  Goal: Absence or prevention of progression during hospitalization  Description  INTERVENTIONS:  - Assess and monitor for signs and symptoms of infection  - Monitor lab/diagnostic results  - Monitor all insertion sites, i e  indwelling lines, tubes, and drains  - Monitor endotracheal if appropriate and nasal secretions for changes in amount and color  - [place] appropriate cooling/warming therapies per order  - Administer medications as ordered  - Instruct and encourage patient and family to use good hand hygiene technique  - Identify and instruct in appropriate isolation precautions for identified infection/condition  Outcome: Progressing     Problem: SAFETY ADULT  Goal: Maintain or return to baseline ADL function  Description  INTERVENTIONS:  -  Assess patient's ability to carry out ADLs; assess patient's baseline for ADL function and identify physical deficits which impact ability to perform ADLs (bathing, care of mouth/teeth, toileting, grooming, dressing, etc )  - Assess/evaluate cause of self-care deficits   - Assess range of motion  - Assess patient's mobility; develop plan if impaired  - Assess patient's need for assistive devices and provide as appropriate  - Encourage maximum independence but intervene and supervise when necessary  - Involve family in performance of ADLs  - Assess for home care needs following discharge   - Consider OT consult to assist with ADL evaluation and planning for discharge  - Provide patient education as appropriate  Outcome: Progressing  Goal: Maintain or return mobility status to optimal level  Description  INTERVENTIONS:  - Assess patient's baseline mobility status (ambulation, transfers, stairs, etc )    - Identify cognitive and physical deficits and behaviors that affect mobility  - Identify mobility aids required to assist with transfers and/or ambulation (gait belt, sit-to-stand, lift, walker, cane, etc )  - [place] fall precautions as indicated by assessment  - Record patient progress and toleration of activity level on Mobility SBAR; progress patient to next Phase/Stage  - Instruct patient to call for assistance with activity based on assessment  - Consider rehabilitation consult to assist with strengthening/weightbearing, etc   Outcome: Progressing     Problem: DISCHARGE PLANNING  Goal: Discharge to home or other facility with appropriate resources  Description  INTERVENTIONS:  - Identify barriers to discharge w/patient and caregiver  - Arrange for needed discharge resources and transportation as appropriate  - Identify discharge learning needs (meds, wound care, etc )  - Arrange for interpretive services to assist at discharge as needed  - Refer to Case Management Department for coordinating discharge planning if the patient needs post-hospital services based on physician/advanced practitioner order or complex needs related to functional status, cognitive ability, or social support system  Outcome: Progressing     Problem: SKIN/TISSUE INTEGRITY - ADULT  Goal: Skin integrity remains intact  Description  INTERVENTIONS  - Identify patients at risk for skin breakdown  - Assess and monitor skin integrity  - Assess and monitor nutrition and hydration status  - Monitor labs (i e  albumin)  - Assess for incontinence   - Turn and reposition patient  - Assist with mobility/ambulation  - Relieve pressure over bony prominences  - Avoid friction and shearing  - Provide appropriate hygiene as needed including keeping skin clean and dry  - Evaluate need for skin moisturizer/barrier cream  - Collaborate with interdisciplinary team (i e  Nutrition, Rehabilitation, etc )   - Patient/family teaching  Outcome: Progressing  Goal: Incision(s), wounds(s) or drain site(s) healing without S/S of infection  Description  INTERVENTIONS  - Assess and document risk factors for skin impairment   - Assess and document dressing, incision, wound bed, drain sites and surrounding tissue  - Consider nutrition services referral as needed  - Oral mucous membranes remain intact  - Provide patient/ family education  Outcome: Progressing  Goal: Oral mucous membranes remain intact  Description  INTERVENTIONS  - Assess oral mucosa and hygiene practices  - Implement preventative oral hygiene regimen  - Implement oral medicated treatments as ordered  - Initiate Nutrition services referral as needed  Outcome: Progressing     Problem: Prexisting or High Potential for Compromised Skin Integrity  Goal: Skin integrity is maintained or improved  Description  INTERVENTIONS:  - Identify patients at risk for skin breakdown  - Assess and monitor skin integrity  - Assess and monitor nutrition and hydration status  - Monitor labs   - Assess for incontinence   - Turn and reposition patient  - Assist with mobility/ambulation  - Relieve pressure over bony prominences  - Avoid friction and shearing  - Provide appropriate hygiene as needed including keeping skin clean and dry  - Evaluate need for skin moisturizer/barrier cream  - Collaborate with interdisciplinary team   - Patient/family teaching  - Consider wound care consult   Outcome: Progressing

## 2020-02-09 NOTE — PROGRESS NOTES
Progress Note - Podiatry  Alonzo Pisano 75 y o  male MRN: [id number]  Unit/Bed#: [place] [id number] Encounter: [id number]    Assessment:  1  Left foot surgical wound dehiscence with suspected underlying OM  - S/P left hallux amputation (DOS: 1/8/20)  - S/P left foot I&D with removal of sesamoids (DOS: 1/12/20)  2  DM type 2 with neuropathy  3  Acute respiratory failure  4  Complete heart block - in situ permanent pacemaker  5  PAF  6  Acute diastolic congestive heart failure  7  CKD stage 3  8  Hx of Staph aureus bacteremia    Plan:  - rounded with Dr Wendy Cobb  left foot wound clinically appears stable  Dressed with Betadine-soaked and dry sterile dressings  Patient will require left partial 1st ray amputation pending Ceretec white blood cell scan  - continue nonweightbearing to the left lower extremity  - appreciate ID for antibiotic recommendation  Continue IV cefepime  - appreciate cardiology for preoperative clearance, his current risk remains acceptable and unchanged (intermediate)  -appreciate slim for medical management  - Appreciate Nephrology for CKD stage 3 management    Subjective/Objective   Chief Complaint: No chief complaint on file  Subjective: 75 y o  y/o male was seen and evaluated at bedside  Patient denies any acute events overnight  Blood pressure 162/65, pulse 83, temperature 98 3 °F (36 8 °C), resp  rate 18, height 5' 10" (1 778 m), weight 96 5 kg (212 lb 11 9 oz), SpO2 95 %  ,Body mass index is 30 53 kg/m²  Invasive Devices     Peripherally Inserted Central Catheter Line            PICC Line 05/04/58 Right Basilic 26 days                Physical Exam:   General: Alert, cooperative and no distress  Lungs: Non labored breathing  Heart: Positive S1, S2  Abdomen: Soft, non-tender  Extremity:  Left foot wound appears clinically stable with serosanguineous drainage noted  Both wound bed are mixture of fibrotic and granular wound base  Both wounds are palpable to bone    Macerated wound edges  Serosanguineous drainage noted  No crepitus or fluctuance noted  Erythema present however regressing  Pitting edema present  Neurovascular and musculoskeletal status at baseline  Lab, Imaging and other studies:   CBC:   Lab Results   Component Value Date    WBC 9 88 02/09/2020    HGB 7 8 (L) 02/09/2020    HCT 25 2 (L) 02/09/2020    MCV 94 02/09/2020     02/09/2020    MCH 29 1 02/09/2020    MCHC 31 0 (L) 02/09/2020    RDW 14 6 02/09/2020    MPV 10 0 02/09/2020    NRBC 0 02/09/2020   , CMP:   Lab Results   Component Value Date    SODIUM 136 02/09/2020    K 5 1 02/09/2020     02/09/2020    CO2 22 02/09/2020    BUN 57 (H) 02/09/2020    CREATININE 1 93 (H) 02/09/2020    CALCIUM 8 7 02/09/2020    EGFR 35 02/09/2020       Imaging: I have personally reviewed pertinent films in PACS  EKG, Pathology, and Other Studies: I have personally reviewed pertinent reports

## 2020-02-09 NOTE — PROGRESS NOTES
[facility] PROGRESS NOTE   Cesar Webster 75 y o  male MRN: [id number]  Unit/Bed#: [place] [address] [place] [address] [id number] Encounter: [id number]  Reason for Consult:  Acute kidney injury on CKD    ASSESSMENT and PLAN:  1  Acute kidney injury on Chronic kidney disease, stage IIIB:    · Nephrologist:  Dr Jenn Lew  · Baseline creatinine appears to be between 1 2-1 5 although creatinine intermittently has been as high as 1 8  · Recent acute kidney injury with creatinine up to 3 during hospitalization in January  Likely ATN  Creatinine plateaued near 3 6-1 at discharge  He presents with a creatinine of 2 05 setting of recent acute kidney injury, losartan use and Bactrim x2 doses  ? Urine positive for microscopic hematuria and proteinuria, 0-1 granular casts  ? Follow-up creatinine 2 02-->1 93  · Recent renal ultrasound right kidney 13 8 cm, left kidney 14 5 cm, right kidney Renal parents time a diffusely echogenic consistent with medical renal disease  Simple cyst right kidney, no hydronephrosis bilaterally, no shadowing calculi  Left kidney normal echogenicity and contour  Jones catheter was in place during study  · Urinalysis 02/08/2020:  Greater than 3+ protein, 20-30 RBCs, no WBCs, 0-1 granular casts  · Urinalysis 01/19/2020:  Greater than 3+ protein, 4-10 RBCs, 4-10 WBCs, 0-1 fine granular casts  Moderate bacteria  Eosinophils 1%  · Nephrotic range proteinuria -Last urine protein creatinine ratio 4 1 g July 2019  Prior SPEP and UPEP negative for monoclonal gammopathy  · Plan:  ? Continue to hold losartan  ? No indication for IV fluids  ? Check BMP daily  ? Avoid nephrotoxic agents, avoid hypotension, no NSAIDs  2  Hypertension:    · Blood pressure acceptable at this time  · He is currently on amlodipine 10 mg daily, losartan 25 mg daily, furosemide 40 mg b i d , metoprolol 50 mg every 12 hours  · Plan:    ? Holding losartan in light of anticipated surgery/elevated creatinine/infection  ?  Continue Lasix- history of CHF, flash pulmonary edema  ? No metolazone, patient has the medication available but has never needed it  3  Left renal artery stenosis 60%  History of flash pulmonary edema  Also history of mitral valve stenosis  4  Left foot wound secondary to surgical wound dehiscence  · Concern for underlying osteomyelitis  Recent MSSA bacteremia  Currently on cefepime  Follow-up blood cultures scheduled to/20  · Ceretec scan left foot planned  · Podiatry following  · Tentative plan for surgical intervention Monday or Tuesday  · On cefepime  5  Acid-base:  Acceptable, bicarbonate 22  6  Electrolytes:  Potassium high normal, 5 3 on admission, now between 5 0-5 1     Losartan on hold  Monitor  7  Diastolic CHF:   compensated  8  Anemia of chronic disease:    · Hemoglobin recently in the 8s, slightly lower at 7 5 on 02/08 and 7 8 on 02/09  · Monitor  9  History of sick sinus syndrome:  Status post permanent pacemaker  10  Diabetes mellitus type 2:  Management per primary team  11  PAF:  Eliquis on hold due to pending surgery     DISPOSITION:  No change in plan of care  Continue to hold losartan  Continue loop diuretic  Check labs in the a m  SUBJECTIVE / INTERVAL HISTORY:  No complaints  Denies shortness of breath  States he feels okay      OBJECTIVE:  Current Weight: Weight - Scale: 96 5 kg (212 lb 11 9 oz)  Vitals:    02/08/20 2118 02/08/20 2200 02/09/20 0332 02/09/20 0600   BP: 144/59      BP Location:       Pulse: 73      Resp: 20      Temp: 98 1 °F (36 7 °C)      TempSrc:       SpO2: 92% 93% 92%    Weight:    96 5 kg (212 lb 11 9 oz)   Height:           Intake/Output Summary (Last 24 hours) at 2/9/2020 0743  Last data filed at 2/8/2020 1501  Gross per 24 hour   Intake    Output 400 ml   Net -400 ml     General: NAD, getting up to perform activities of daily living  Skin: no rash  Eyes: anicteric sclera  ENT: moist mucous membrane  Neck: supple, no JVD  Chest: CTA b/l, no ronchii, no wheeze, no rubs, no rales  Normal effort  CVS: s1s2, no murmur, no gallop, no rub  Abdomen: soft, nontender, nl sounds  Extremities:  Left foot in boot    Mild edema  : no ojeda  Neuro: AAOX3  Psych: normal affect  Medications:    Current Facility-Administered Medications:     acetaminophen (TYLENOL) tablet 650 mg, 650 mg, Oral, Q6H PRN, Tim Fort, DPM    allopurinol (ZYLOPRIM) tablet 300 mg, 300 mg, Oral, QAM, Tim Fort, DPM, 300 mg at 02/08/20 0819    amLODIPine (NORVASC) tablet 10 mg, 10 mg, Oral, Daily, Vika Ros, CRNP, 10 mg at 02/08/20 0820    cefepime (MAXIPIME) 1,000 mg in dextrose 5 % 50 mL IVPB, 1,000 mg, Intravenous, Q12H, Gregoria Medrano MD, Last Rate: 100 mL/hr at 02/09/20 0529, 1,000 mg at 02/09/20 0529    ferrous gluconate (FERGON) tablet 324 mg, 324 mg, Oral, BID AC, Azalea Hinds, DPM, 324 mg at 02/08/20 1516    fish oil capsule 1,000 mg, 1,000 mg, Oral, BID, Tim Fort, DPM, 1,000 mg at 02/08/20 1733    furosemide (LASIX) tablet 40 mg, 40 mg, Oral, BID (diuretic), Tim Fort, DPM, 40 mg at 02/08/20 1516    heparin (porcine) subcutaneous injection 5,000 Units, 5,000 Units, Subcutaneous, Q8H [address], 5,000 Units at 02/09/20 0524 **AND** [CANCELED] Platelet count, , , Once, Tim Fort, DPM    insulin lispro (HumaLOG) 100 units/mL subcutaneous injection 1-6 Units, 1-6 Units, Subcutaneous, TID AC, 1 Units at 02/08/20 1639 **AND** Fingerstick Glucose (POCT), , , TID AC, Azalea Hinds, DPM    insulin lispro (HumaLOG) 100 units/mL subcutaneous injection 1-6 Units, 1-6 Units, Subcutaneous, HS, Azalea Hinds, DPM    LORazepam (ATIVAN) tablet 0 5 mg, 0 5 mg, Oral, Q8H PRN, Tim Fort, DPM, 0 5 mg at 02/08/20 1807    metoprolol tartrate (LOPRESSOR) tablet 50 mg, 50 mg, Oral, Q12H [address], Azalea Hinds, DPM, 50 mg at 02/08/20 2156    multivitamin-minerals (CENTRUM) tablet 1 tablet, 1 tablet, Oral, Daily, Tim Fort, DPM, 1 tablet at 02/08/20 0819    oxyCODONE (ROXICODONE) IR tablet 5 mg, 5 mg, Oral, Q6H PRN, Azalea  Yared, DPM, 5 mg at 02/07/20 1824    pravastatin (PRAVACHOL) tablet 20 mg, 20 mg, Oral, Daily With Dinner, Ankush Marmolejo, DPM, 20 mg at 02/08/20 1638    tamsulosin (FLOMAX) capsule 0 4 mg, 0 4 mg, Oral, Daily With Nikki Ness, DPM, 0 4 mg at 02/08/20 1638    Laboratory Results:  Results from last 7 days   Lab Units 02/09/20  0528 02/08/20  0523 02/07/20  1715   WBC Thousand/uL 9 88 9 29 12 38*   HEMOGLOBIN g/dL 7 8* 7 5* 8 3*   HEMATOCRIT % 25 2* 24 6* 26 6*   PLATELETS Thousands/uL 297 292 325   POTASSIUM mmol/L 5 1 5 0 5 3   CHLORIDE mmol/L 105 106 102   CO2 mmol/L 22 22 23   BUN mg/dL 57* 66* 72*   CREATININE mg/dL 1 93* 2 02* 2 05*   CALCIUM mg/dL 8 7 8 2* 8 6

## 2020-02-09 NOTE — ASSESSMENT & PLAN NOTE
· On iron supplementation  · Monitor H&H  · Could be contributing to shortness of breath  · May need tranfusion prior to surgery  Lab Results   Component Value Date    HGB 7 8 (L) 02/09/2020    HGB 7 5 (L) 02/08/2020    HGB 8 3 (L) 02/07/2020

## 2020-02-09 NOTE — ASSESSMENT & PLAN NOTE
· Baseline variable, most recent 1 9-2 since last admission  · Avoid nephrotoxins / hypotension with recent ELZBIETA  Losartan on hold  Zaroxolyn discontinued  Continue Lasix for history of flash pulmonary edema    · UA with protein, blood  · Monitor BMP / I&O / daily weights  · Nephrology following  Lab Results   Component Value Date    CREATININE 1 93 (H) 02/09/2020    CREATININE 2 02 (H) 02/08/2020    CREATININE 2 05 (H) 02/07/2020

## 2020-02-09 NOTE — PLAN OF CARE
Problem: Potential for Falls  Goal: Patient will remain free of falls  Description  INTERVENTIONS:  - Assess patient frequently for physical needs  -  Identify cognitive and physical deficits and behaviors that affect risk of falls    -  [place] fall precautions as indicated by assessment   - Educate patient/family on patient safety including physical limitations  - Instruct patient to call for assistance with activity based on assessment  - Modify environment to reduce risk of injury  - Consider OT/PT consult to assist with strengthening/mobility  Outcome: Progressing     Problem: INFECTION - ADULT  Goal: Absence or prevention of progression during hospitalization  Description  INTERVENTIONS:  - Assess and monitor for signs and symptoms of infection  - Monitor lab/diagnostic results  - Monitor all insertion sites, i e  indwelling lines, tubes, and drains  - Monitor endotracheal if appropriate and nasal secretions for changes in amount and color  - [place] appropriate cooling/warming therapies per order  - Administer medications as ordered  - Instruct and encourage patient and family to use good hand hygiene technique  - Identify and instruct in appropriate isolation precautions for identified infection/condition  Outcome: Progressing     Problem: SAFETY ADULT  Goal: Maintain or return to baseline ADL function  Description  INTERVENTIONS:  -  Assess patient's ability to carry out ADLs; assess patient's baseline for ADL function and identify physical deficits which impact ability to perform ADLs (bathing, care of mouth/teeth, toileting, grooming, dressing, etc )  - Assess/evaluate cause of self-care deficits   - Assess range of motion  - Assess patient's mobility; develop plan if impaired  - Assess patient's need for assistive devices and provide as appropriate  - Encourage maximum independence but intervene and supervise when necessary  - Involve family in performance of ADLs  - Assess for home care needs following discharge   - Consider OT consult to assist with ADL evaluation and planning for discharge  - Provide patient education as appropriate  Outcome: Progressing  Goal: Maintain or return mobility status to optimal level  Description  INTERVENTIONS:  - Assess patient's baseline mobility status (ambulation, transfers, stairs, etc )    - Identify cognitive and physical deficits and behaviors that affect mobility  - Identify mobility aids required to assist with transfers and/or ambulation (gait belt, sit-to-stand, lift, walker, cane, etc )  - [place] fall precautions as indicated by assessment  - Record patient progress and toleration of activity level on Mobility SBAR; progress patient to next Phase/Stage  - Instruct patient to call for assistance with activity based on assessment  - Consider rehabilitation consult to assist with strengthening/weightbearing, etc   Outcome: Progressing     Problem: DISCHARGE PLANNING  Goal: Discharge to home or other facility with appropriate resources  Description  INTERVENTIONS:  - Identify barriers to discharge w/patient and caregiver  - Arrange for needed discharge resources and transportation as appropriate  - Identify discharge learning needs (meds, wound care, etc )  - Arrange for interpretive services to assist at discharge as needed  - Refer to Case Management Department for coordinating discharge planning if the patient needs post-hospital services based on physician/advanced practitioner order or complex needs related to functional status, cognitive ability, or social support system  Outcome: Progressing     Problem: SKIN/TISSUE INTEGRITY - ADULT  Goal: Skin integrity remains intact  Description  INTERVENTIONS  - Identify patients at risk for skin breakdown  - Assess and monitor skin integrity  - Assess and monitor nutrition and hydration status  - Monitor labs (i e  albumin)  - Assess for incontinence   - Turn and reposition patient  - Assist with mobility/ambulation  - Relieve pressure over bony prominences  - Avoid friction and shearing  - Provide appropriate hygiene as needed including keeping skin clean and dry  - Evaluate need for skin moisturizer/barrier cream  - Collaborate with interdisciplinary team (i e  Nutrition, Rehabilitation, etc )   - Patient/family teaching  Outcome: Progressing  Goal: Incision(s), wounds(s) or drain site(s) healing without S/S of infection  Description  INTERVENTIONS  - Assess and document risk factors for skin impairment   - Assess and document dressing, incision, wound bed, drain sites and surrounding tissue  - Consider nutrition services referral as needed  - Oral mucous membranes remain intact  - Provide patient/ family education  Outcome: Progressing  Goal: Oral mucous membranes remain intact  Description  INTERVENTIONS  - Assess oral mucosa and hygiene practices  - Implement preventative oral hygiene regimen  - Implement oral medicated treatments as ordered  - Initiate Nutrition services referral as needed  Outcome: Progressing     Problem: Prexisting or High Potential for Compromised Skin Integrity  Goal: Skin integrity is maintained or improved  Description  INTERVENTIONS:  - Identify patients at risk for skin breakdown  - Assess and monitor skin integrity  - Assess and monitor nutrition and hydration status  - Monitor labs   - Assess for incontinence   - Turn and reposition patient  - Assist with mobility/ambulation  - Relieve pressure over bony prominences  - Avoid friction and shearing  - Provide appropriate hygiene as needed including keeping skin clean and dry  - Evaluate need for skin moisturizer/barrier cream  - Collaborate with interdisciplinary team   - Patient/family teaching  - Consider wound care consult   Outcome: Progressing

## 2020-02-09 NOTE — ASSESSMENT & PLAN NOTE
Lab Results   Component Value Date    HGBA1C 6 6 02/03/2020       Recent Labs     02/08/20  1055 02/08/20  1613 02/08/20  2121 02/09/20  0756   POCGLU 120 153* 131 98       Blood Sugar Average: Last 72 hrs:  (P) 113 625   · Maintained on Xultophy (combination drug with basal insulin) 19 units daily, Metformin 500 mg BID  · Hold oral agent while here  · Monitor FSBS ac / hs and cover with SSI  No basal at this time, euglycemic  Adjust insulin doses as needed    · ADA diet

## 2020-02-09 NOTE — PROGRESS NOTES
[facility]  Progress Note - Carl Jeter 1955, 75 y o  male MRN: [id number]    Unit/Bed#: [facility] [facility] 2 [facility] 87 [id number] Encounter: [id number]    Primary Care Provider: Paddy Dumont DO   Date and time admitted to hospital: 2/7/2020  4:09 PM        * Diabetes mellitus with neuropathy [facility])  Assessment & Plan  Lab Results   Component Value Date    HGBA1C 6 6 02/03/2020       Recent Labs     02/08/20  1055 02/08/20  1613 02/08/20  2121 02/09/20  0756   POCGLU 120 153* 131 98       Blood Sugar Average: Last 72 hrs:  (P) 113 625   · Maintained on Xultophy (combination drug with basal insulin) 19 units daily, Metformin 500 mg BID  · Hold oral agent while here  · Monitor FSBS ac / hs and cover with SSI  No basal at this time, euglycemic  Adjust insulin doses as needed  · ADA diet    Osteomyelitis of foot, left, acute ([facility] [address] )  Assessment & Plan  · Podiatry primary team  · With recent hospitalization, S/P left hallux amputation (DOS: 1/8/20) and S/P left foot I&D with removal of sesamoids (DOS: 1/12/20) and now with wound dehiscence  Will likely need left partial 1st ray amputation this admission  · Had MSSA bacteremia, treated until yesterday with IV Ancef  PICC in place  Repeat blood cultures to be performed after 2/20 to ensure eradication  · ID consulted  · Currently on Cefepime  Leukocytosis on arrival, now resolved  Afebrile  · Bone scan pending    Stage 3 chronic kidney disease (HCC)  Assessment & Plan  · Baseline variable, most recent 1 9-2 since last admission  · Avoid nephrotoxins / hypotension with recent ELZBIETA  Losartan on hold  Zaroxolyn discontinued  Continue Lasix for history of flash pulmonary edema    · UA with protein, blood  · Monitor BMP / I&O / daily weights  · Nephrology following  Lab Results   Component Value Date    CREATININE 1 93 (H) 02/09/2020    CREATININE 2 02 (H) 02/08/2020    CREATININE 2 05 (H) 02/07/2020         Acute diastolic (congestive) heart failure [facility])  Assessment & Plan  Wt Readings from Last 3 Encounters:   02/09/20 96 5 kg (212 lb 11 9 oz)   02/07/20 95 8 kg (211 lb 3 2 oz)   01/29/20 94 5 kg (208 lb 5 4 oz)     · Maintained on Lasix 40 mg BID and Zaroxolyn as needed  · Patient's weight is up slightly  He has had progressive dyspnea however is also very deconditioned from recent hospitalization  · Recent echo performed  · CXR with mild interstitial edema, small left effusion with left base atelectasis  Incentive spirometer ordered  · BNP elevated at 2,811  · Daily weight / I&O  · Cardiology consulted for recommendations prior to surgery  Continuing home regimen of Lasix 40 mg po BID          Peripheral arterial disease ([facility] )  Assessment & Plan  · Recent LEAD's on 1/13 with diffuse disease without focal stenosis  · Maintained on Lovaza and simvastatin    Anxiety  Assessment & Plan  · Maintained on Ativan as needed  · Continue while here    Iron deficiency anemia  Assessment & Plan  · On iron supplementation  · Monitor H&H  · Could be contributing to shortness of breath  · May need tranfusion prior to surgery  Lab Results   Component Value Date    HGB 7 8 (L) 02/09/2020    HGB 7 5 (L) 02/08/2020    HGB 8 3 (L) 02/07/2020         Essential hypertension  Assessment & Plan  · Maintained on amlodipine, metoprolol, and losartan  · Hold losartan for now with recent ELZBIETA  · Monitor VS    NICOLASA (obstructive sleep apnea)  Assessment & Plan  · Maintained on home CPAP therapy  · Continue    PAF (paroxysmal atrial fibrillation) ([facility])  Assessment & Plan  · Maintained on Metoprolol 50 mg BID and Eliquis  · Eliquis on hold for pending surgery  · Cardiology consulted    Complete heart block ([facility] )  Assessment & Plan  · S/p pacemaker implantation  · Cardiology following    Hyperlipidemia  Assessment & Plan  · Maintained on simvastatin and Lovaza  · Continue    Gout  Assessment & Plan  · Maintained on Allopurinol        VTE Pharmacologic Prophylaxis:   Pharmacologic: Heparin  Mechanical VTE Prophylaxis in Place: Yes    Patient Centered Rounds: I have performed bedside rounds with nursing staff today  Discussions with Specialists or Other Care Team Provider:  Provider notes reviewed    Education and Discussions with Family / Patient:  Plan of care with patient    Time Spent for Care: 30 minutes  More than 50% of total time spent on counseling and coordination of care as described above  Current Length of Stay: 2 day(s)    Current Patient Status: Inpatient   Certification Statement: The patient will continue to require additional inpatient hospital stay due to Further evaluation/possible surgical intervention/IV antibiotics    Discharge Plan:  Pending clinical improvement    Code Status: Level 1 - Full Code      Subjective:   Patient denies current pain  Continues to have some mild shortness of breath  No other complaints  Tolerating his diet  Objective:     Vitals:   Temp (24hrs), Av 9 °F (36 6 °C), Min:97 3 °F (36 3 °C), Max:98 3 °F (36 8 °C)    Temp:  [97 3 °F (36 3 °C)-98 3 °F (36 8 °C)] 98 3 °F (36 8 °C)  HR:  [67-83] 83  Resp:  [18-20] 18  BP: (143-162)/(59-65) 162/65  SpO2:  [92 %-95 %] 95 %  Body mass index is 30 53 kg/m²  Input and Output Summary (last 24 hours): Intake/Output Summary (Last 24 hours) at 2020 0837  Last data filed at 2020 1501  Gross per 24 hour   Intake    Output 400 ml   Net -400 ml       Physical Exam:     Physical Exam   Constitutional: He is oriented to person, place, and time  He appears well-developed and well-nourished  No distress  HENT:   Head: Normocephalic and atraumatic  Eyes: Conjunctivae are normal  No scleral icterus  Cardiovascular: Normal rate and regular rhythm  Murmur heard  Pulmonary/Chest: Effort normal  No respiratory distress  He has decreased breath sounds in the left lower field  He has no wheezes  He has no rhonchi  He has no rales  On room air    Appears mildly short of breath with conversation  Abdominal: Soft  Bowel sounds are normal  He exhibits no distension  There is no tenderness  Musculoskeletal: Normal range of motion  He exhibits no edema or tenderness  Neurological: He is alert and oriented to person, place, and time  Skin: Skin is warm and dry  He is not diaphoretic  Left foot with dressing  Psychiatric: He has a normal mood and affect  His behavior is normal    Nursing note and vitals reviewed  Additional Data:     Labs:    Results from last 7 days   Lab Units 02/09/20  0528   WBC Thousand/uL 9 88   HEMOGLOBIN g/dL 7 8*   HEMATOCRIT % 25 2*   PLATELETS Thousands/uL 297   NEUTROS PCT % 67   LYMPHS PCT % 10*   MONOS PCT % 9   EOS PCT % 13*     Results from last 7 days   Lab Units 02/09/20  0528  02/07/20  1715   SODIUM mmol/L 136   < > 134*   POTASSIUM mmol/L 5 1   < > 5 3   CHLORIDE mmol/L 105   < > 102   CO2 mmol/L 22   < > 23   BUN mg/dL 57*   < > 72*   CREATININE mg/dL 1 93*   < > 2 05*   ANION GAP mmol/L 9   < > 9   CALCIUM mg/dL 8 7   < > 8 6   ALBUMIN g/dL  --   --  2 3*   TOTAL BILIRUBIN mg/dL  --   --  0 33   ALK PHOS U/L  --   --  88   ALT U/L  --   --  8*   AST U/L  --   --  14   GLUCOSE RANDOM mg/dL 94   < > 101    < > = values in this interval not displayed  Results from last 7 days   Lab Units 02/09/20  0756 02/08/20  2121 02/08/20  1613 02/08/20  1055 02/08/20  0733 02/07/20  2136 02/07/20  2105 02/07/20  1724   POC GLUCOSE mg/dl 98 131 153* 120 82 135 83 107     Results from last 7 days   Lab Units 02/03/20   HEMOGLOBIN A1C  6 6               * I Have Reviewed All Lab Data Listed Above  * Additional Pertinent Lab Tests Reviewed:  [facility] Admission Reviewed    Imaging:    Imaging Reports Reviewed Today Include:  None  Imaging Personally Reviewed by Myself Includes:  None    Recent Cultures (last 7 days):           Last 24 Hours Medication List:     Current Facility-Administered Medications:  acetaminophen 650 mg Oral Q6H PRN Petty Balling, DPM    allopurinol 300 mg Oral QAM Petty Balling, DPM    amLODIPine 10 mg Oral Daily PAT Bella    cefepime 1,000 mg Intravenous Q12H Vance Mohmaud MD Last Rate: 1,000 mg (02/09/20 0529)   ferrous gluconate 324 mg Oral BID AC Azalea Hinds, DPM    fish oil 1,000 mg Oral BID Petty Balling, DPM    furosemide 40 mg Oral BID (diuretic) Petty Balling, DPM    heparin (porcine) 5,000 Units Subcutaneous Q8H [address] Azalea Hinds, DPM    insulin lispro 1-6 Units Subcutaneous TID AC Azalea Hinds, DPM    insulin lispro 1-6 Units Subcutaneous HS Azalea Hinds, DPM    LORazepam 0 5 mg Oral Q8H PRN Petty Balling, DPM    metoprolol tartrate 50 mg Oral Q12H [address] Petty Balling, DPM    multivitamin-minerals 1 tablet Oral Daily Petty Balling, DPM    oxyCODONE 5 mg Oral Q6H PRN Petty Balling, DPM    pravastatin 20 mg Oral Daily With Dinner Petty Balling, DPM    tamsulosin 0 4 mg Oral Daily With [address] [facility], DP         Today, Patient Was Seen By: PAT Mayo    ** Please Note: Dictation voice to text software may have been used in the creation of this document   **

## 2020-02-10 ENCOUNTER — APPOINTMENT (INPATIENT)
Dept: RADIOLOGY | Facility: HOSPITAL | Age: 65
DRG: 907 | End: 2020-02-10
Payer: MEDICARE

## 2020-02-10 ENCOUNTER — ANESTHESIA (INPATIENT)
Dept: PERIOP | Facility: HOSPITAL | Age: 65
DRG: 907 | End: 2020-02-10
Payer: MEDICARE

## 2020-02-10 ENCOUNTER — ANESTHESIA EVENT (INPATIENT)
Dept: PERIOP | Facility: HOSPITAL | Age: 65
DRG: 907 | End: 2020-02-10
Payer: MEDICARE

## 2020-02-10 DIAGNOSIS — Z71.89 COMPLEX CARE COORDINATION: Primary | ICD-10-CM

## 2020-02-10 LAB
ANION GAP SERPL CALCULATED.3IONS-SCNC: 10 MMOL/L (ref 4–13)
BASOPHILS # BLD AUTO: 0.08 THOUSANDS/ΜL (ref 0–0.1)
BASOPHILS NFR BLD AUTO: 1 % (ref 0–1)
BUN SERPL-MCNC: 49 MG/DL (ref 5–25)
CALCIUM SERPL-MCNC: 8.3 MG/DL (ref 8.3–10.1)
CHLORIDE SERPL-SCNC: 105 MMOL/L (ref 100–108)
CO2 SERPL-SCNC: 22 MMOL/L (ref 21–32)
CREAT SERPL-MCNC: 1.86 MG/DL (ref 0.6–1.3)
EOSINOPHIL # BLD AUTO: 1.62 THOUSAND/ΜL (ref 0–0.61)
EOSINOPHIL NFR BLD AUTO: 19 % (ref 0–6)
ERYTHROCYTE [DISTWIDTH] IN BLOOD BY AUTOMATED COUNT: 14.7 % (ref 11.6–15.1)
GFR SERPL CREATININE-BSD FRML MDRD: 37 ML/MIN/1.73SQ M
GLUCOSE SERPL-MCNC: 103 MG/DL (ref 65–140)
GLUCOSE SERPL-MCNC: 110 MG/DL (ref 65–140)
GLUCOSE SERPL-MCNC: 115 MG/DL (ref 65–140)
GLUCOSE SERPL-MCNC: 122 MG/DL (ref 65–140)
GLUCOSE SERPL-MCNC: 86 MG/DL (ref 65–140)
GLUCOSE SERPL-MCNC: 97 MG/DL (ref 65–140)
HCT VFR BLD AUTO: 25.6 % (ref 36.5–49.3)
HGB BLD-MCNC: 7.9 G/DL (ref 12–17)
IMM GRANULOCYTES # BLD AUTO: 0.03 THOUSAND/UL (ref 0–0.2)
IMM GRANULOCYTES NFR BLD AUTO: 0 % (ref 0–2)
LYMPHOCYTES # BLD AUTO: 1.31 THOUSANDS/ΜL (ref 0.6–4.47)
LYMPHOCYTES NFR BLD AUTO: 15 % (ref 14–44)
MCH RBC QN AUTO: 28.6 PG (ref 26.8–34.3)
MCHC RBC AUTO-ENTMCNC: 30.9 G/DL (ref 31.4–37.4)
MCV RBC AUTO: 93 FL (ref 82–98)
MONOCYTES # BLD AUTO: 0.84 THOUSAND/ΜL (ref 0.17–1.22)
MONOCYTES NFR BLD AUTO: 10 % (ref 4–12)
NEUTROPHILS # BLD AUTO: 4.72 THOUSANDS/ΜL (ref 1.85–7.62)
NEUTS SEG NFR BLD AUTO: 55 % (ref 43–75)
NRBC BLD AUTO-RTO: 0 /100 WBCS
PLATELET # BLD AUTO: 284 THOUSANDS/UL (ref 149–390)
PMV BLD AUTO: 9.7 FL (ref 8.9–12.7)
POTASSIUM SERPL-SCNC: 4.9 MMOL/L (ref 3.5–5.3)
RBC # BLD AUTO: 2.76 MILLION/UL (ref 3.88–5.62)
SODIUM SERPL-SCNC: 137 MMOL/L (ref 136–145)
WBC # BLD AUTO: 8.6 THOUSAND/UL (ref 4.31–10.16)

## 2020-02-10 PROCEDURE — 94660 CPAP INITIATION&MGMT: CPT

## 2020-02-10 PROCEDURE — 88311 DECALCIFY TISSUE: CPT | Performed by: PATHOLOGY

## 2020-02-10 PROCEDURE — 85025 COMPLETE CBC W/AUTO DIFF WBC: CPT | Performed by: NURSE PRACTITIONER

## 2020-02-10 PROCEDURE — 80048 BASIC METABOLIC PNL TOTAL CA: CPT | Performed by: STUDENT IN AN ORGANIZED HEALTH CARE EDUCATION/TRAINING PROGRAM

## 2020-02-10 PROCEDURE — 99232 SBSQ HOSP IP/OBS MODERATE 35: CPT | Performed by: INTERNAL MEDICINE

## 2020-02-10 PROCEDURE — 87186 SC STD MICRODIL/AGAR DIL: CPT | Performed by: PODIATRIST

## 2020-02-10 PROCEDURE — 73630 X-RAY EXAM OF FOOT: CPT

## 2020-02-10 PROCEDURE — 87075 CULTR BACTERIA EXCEPT BLOOD: CPT | Performed by: PODIATRIST

## 2020-02-10 PROCEDURE — 82948 REAGENT STRIP/BLOOD GLUCOSE: CPT

## 2020-02-10 PROCEDURE — 88304 TISSUE EXAM BY PATHOLOGIST: CPT | Performed by: PATHOLOGY

## 2020-02-10 PROCEDURE — 99232 SBSQ HOSP IP/OBS MODERATE 35: CPT | Performed by: PHYSICIAN ASSISTANT

## 2020-02-10 PROCEDURE — 87077 CULTURE AEROBIC IDENTIFY: CPT | Performed by: PODIATRIST

## 2020-02-10 PROCEDURE — 87176 TISSUE HOMOGENIZATION CULTR: CPT | Performed by: PODIATRIST

## 2020-02-10 PROCEDURE — 99233 SBSQ HOSP IP/OBS HIGH 50: CPT | Performed by: INTERNAL MEDICINE

## 2020-02-10 PROCEDURE — 87070 CULTURE OTHR SPECIMN AEROBIC: CPT | Performed by: PODIATRIST

## 2020-02-10 PROCEDURE — 87205 SMEAR GRAM STAIN: CPT | Performed by: PODIATRIST

## 2020-02-10 RX ORDER — FUROSEMIDE 40 MG/1
40 TABLET ORAL DAILY
Status: DISCONTINUED | OUTPATIENT
Start: 2020-02-11 | End: 2020-02-11

## 2020-02-10 RX ORDER — FENTANYL CITRATE/PF 50 MCG/ML
50 SYRINGE (ML) INJECTION
Status: DISCONTINUED | OUTPATIENT
Start: 2020-02-10 | End: 2020-02-10 | Stop reason: HOSPADM

## 2020-02-10 RX ORDER — FENTANYL CITRATE 50 UG/ML
INJECTION, SOLUTION INTRAMUSCULAR; INTRAVENOUS AS NEEDED
Status: DISCONTINUED | OUTPATIENT
Start: 2020-02-10 | End: 2020-02-10 | Stop reason: SURG

## 2020-02-10 RX ORDER — SODIUM CHLORIDE 9 MG/ML
INJECTION, SOLUTION INTRAVENOUS CONTINUOUS PRN
Status: DISCONTINUED | OUTPATIENT
Start: 2020-02-10 | End: 2020-02-10 | Stop reason: SURG

## 2020-02-10 RX ORDER — PROPOFOL 10 MG/ML
INJECTION, EMULSION INTRAVENOUS CONTINUOUS PRN
Status: DISCONTINUED | OUTPATIENT
Start: 2020-02-10 | End: 2020-02-10 | Stop reason: SURG

## 2020-02-10 RX ORDER — PROPOFOL 10 MG/ML
INJECTION, EMULSION INTRAVENOUS AS NEEDED
Status: DISCONTINUED | OUTPATIENT
Start: 2020-02-10 | End: 2020-02-10 | Stop reason: SURG

## 2020-02-10 RX ORDER — MAGNESIUM HYDROXIDE 1200 MG/15ML
LIQUID ORAL AS NEEDED
Status: DISCONTINUED | OUTPATIENT
Start: 2020-02-10 | End: 2020-02-10 | Stop reason: HOSPADM

## 2020-02-10 RX ORDER — HYDROMORPHONE HCL/PF 1 MG/ML
0.5 SYRINGE (ML) INJECTION
Status: DISCONTINUED | OUTPATIENT
Start: 2020-02-10 | End: 2020-02-10 | Stop reason: HOSPADM

## 2020-02-10 RX ORDER — ONDANSETRON 2 MG/ML
4 INJECTION INTRAMUSCULAR; INTRAVENOUS ONCE AS NEEDED
Status: DISCONTINUED | OUTPATIENT
Start: 2020-02-10 | End: 2020-02-10 | Stop reason: HOSPADM

## 2020-02-10 RX ORDER — MEPERIDINE HYDROCHLORIDE 50 MG/ML
12.5 INJECTION INTRAMUSCULAR; INTRAVENOUS; SUBCUTANEOUS ONCE AS NEEDED
Status: DISCONTINUED | OUTPATIENT
Start: 2020-02-10 | End: 2020-02-10 | Stop reason: HOSPADM

## 2020-02-10 RX ORDER — MIDAZOLAM HYDROCHLORIDE 2 MG/2ML
INJECTION, SOLUTION INTRAMUSCULAR; INTRAVENOUS AS NEEDED
Status: DISCONTINUED | OUTPATIENT
Start: 2020-02-10 | End: 2020-02-10 | Stop reason: SURG

## 2020-02-10 RX ADMIN — HEPARIN SODIUM 5000 UNITS: 5000 INJECTION INTRAVENOUS; SUBCUTANEOUS at 22:30

## 2020-02-10 RX ADMIN — METOPROLOL TARTRATE 50 MG: 50 TABLET, FILM COATED ORAL at 20:29

## 2020-02-10 RX ADMIN — Medication 1 TABLET: at 09:17

## 2020-02-10 RX ADMIN — TAMSULOSIN HYDROCHLORIDE 0.4 MG: 0.4 CAPSULE ORAL at 20:29

## 2020-02-10 RX ADMIN — Medication 1000 MG: at 20:29

## 2020-02-10 RX ADMIN — PRAVASTATIN SODIUM 20 MG: 20 TABLET ORAL at 20:28

## 2020-02-10 RX ADMIN — FERROUS GLUCONATE 324 MG: 324 TABLET ORAL at 09:17

## 2020-02-10 RX ADMIN — SODIUM CHLORIDE: 0.9 INJECTION, SOLUTION INTRAVENOUS at 17:16

## 2020-02-10 RX ADMIN — CEFEPIME HYDROCHLORIDE 1000 MG: 1 INJECTION, POWDER, FOR SOLUTION INTRAMUSCULAR; INTRAVENOUS at 16:47

## 2020-02-10 RX ADMIN — CEFEPIME HYDROCHLORIDE 1000 MG: 1 INJECTION, POWDER, FOR SOLUTION INTRAMUSCULAR; INTRAVENOUS at 04:57

## 2020-02-10 RX ADMIN — PROPOFOL 80 MCG/KG/MIN: 10 INJECTION, EMULSION INTRAVENOUS at 17:30

## 2020-02-10 RX ADMIN — ALLOPURINOL 300 MG: 100 TABLET ORAL at 09:17

## 2020-02-10 RX ADMIN — PROPOFOL 50 MG: 10 INJECTION, EMULSION INTRAVENOUS at 17:31

## 2020-02-10 RX ADMIN — HEPARIN SODIUM 5000 UNITS: 5000 INJECTION INTRAVENOUS; SUBCUTANEOUS at 05:02

## 2020-02-10 RX ADMIN — METOPROLOL TARTRATE 50 MG: 50 TABLET, FILM COATED ORAL at 09:17

## 2020-02-10 RX ADMIN — LIDOCAINE HYDROCHLORIDE 50 MG: 20 INJECTION, SOLUTION INTRAVENOUS at 17:30

## 2020-02-10 RX ADMIN — MIDAZOLAM 2 MG: 1 INJECTION INTRAMUSCULAR; INTRAVENOUS at 17:30

## 2020-02-10 RX ADMIN — FENTANYL CITRATE 100 MCG: 50 INJECTION, SOLUTION INTRAMUSCULAR; INTRAVENOUS at 17:31

## 2020-02-10 RX ADMIN — Medication 1000 MG: at 09:17

## 2020-02-10 NOTE — SOCIAL WORK
Patient is a 30-day readmit  At that time he reported to CM: "Met with pt to complete assessment and explain role  PCP is Dr Nadine Mckeon  Pt lives in [place] with his spouse, son, dtr-in-law and grandchildren in a 2 story house with no steps to enter  Pt was independent with ADLs, drives a car amb with no devices  DME RW and NO longer has home 02 by [employer]  Pt was not open with any VNA  Pt stated he has help at home if he needs it  Pt uses [facility] in [place]  Pt has no hx of MH or D&A  Pt has POA/Living Will  Spouse Nathan Saunders is  and someone in the family will transport home  Informed pt of PT recommendation is SNF and home PT  Pt is refusing both  Pt stated he wants to do OP PT at [facility] where he was before  Pt stated he knows the PT staff there very well and would like to go there  PT is recommending walker and w/c  Pt stated he has walker and would not need w/c :    Patient was d/c with VNA for WC, W/C, BSC, W/C ramp, knee scooter and was sent home on IV ABX at that time  This assigned CM will be following  An [email or web address] message was sent to OP CM for a [facility] appt to be scheduled once pt is d/c as he is  medium risk of readmission       GUILLERMO Rao  2/10/2020  1113

## 2020-02-10 NOTE — ASSESSMENT & PLAN NOTE
· Maintained on amlodipine, metoprolol, and losartan  · Hold losartan for now with recent ELZBIETA  · Nephrology following   · Monitor VS

## 2020-02-10 NOTE — PROGRESS NOTES
Progress Note - Podiatry  Shaina Toribio 75 y o  male MRN: [id number]  Unit/Bed#: [place] [id number] Encounter: [id number]    Assessment:  1  Left foot surgical wound dehiscence with suspected underlying OM  - S/P left hallux amputation (DOS: 1/8/20)  - S/P left foot I&D with removal of sesamoids (DOS: 1/12/20)  2  DM type 2 with neuropathy  3  Acute respiratory failure  4  Complete heart block - in situ permanent pacemaker  5  PAF  6  Acute diastolic congestive heart failure  7  CKD stage 3  8  Hx of Staph aureus bacteremia     Plan:  - dressing left intact  Patient to OR today with Dr Linnell Klinefelter for left partial 1st ray amputation  - risks, benefits and alternatives were discussed  All questions answered  No guarantees to the procedure were made  Patient was agreeable/understanding to plan  - will hold lasix today in anticipation to OR per nephrology recommendations  - appreciate clearance from cardiology, plan to resume eliquis after surgery  - c/w IV cefepime, appreciate ID recs      Subjective/Objective   Chief Complaint: No chief complaint on file  Subjective: 75 y o   male was seen and evaluated at bedside  Denies n/v/f/c/sob  No new complaints    Blood pressure 157/63, pulse 71, temperature 97 8 °F (36 6 °C), temperature source Oral, resp  rate 18, height 5' 10" (1 778 m), weight 96 2 kg (212 lb 1 3 oz), SpO2 94 %  ,Body mass index is 30 43 kg/m²  Invasive Devices     Peripherally Inserted Central Catheter Line            PICC Line 23/95/84 Right Basilic 27 days                Physical Exam:   General: Alert, cooperative and no distress    Lower Extremity Exam:     NVS at baseline B/l  MSK function at baseline B/l  No calf tenderness noted B/l  Dressing left intact, noted to be clean and dry without strikethrough   No erythema extending proximal to dressing    Lab, Imaging and other studies:   CBC:   Lab Results   Component Value Date    WBC 8 60 02/10/2020    HGB 7 9 (L) 02/10/2020    HCT 25 6 (L) 02/10/2020    MCV 93 02/10/2020     02/10/2020    MCH 28 6 02/10/2020    MCHC 30 9 (L) 02/10/2020    RDW 14 7 02/10/2020    MPV 9 7 02/10/2020    NRBC 0 02/10/2020   , CMP:   Lab Results   Component Value Date    SODIUM 137 02/10/2020    K 4 9 02/10/2020     02/10/2020    CO2 22 02/10/2020    BUN 49 (H) 02/10/2020    CREATININE 1 86 (H) 02/10/2020    CALCIUM 8 3 02/10/2020    EGFR 37 02/10/2020       Imaging: I have personally reviewed pertinent films in PACS  EKG, Pathology, and Other Studies: I have personally reviewed pertinent reports  Portions of the record may have been created with voice recognition software  Occasional wrong word or "sound a like" substitutions may have occurred due to the inherent limitations of voice recognition software  Read the chart carefully and recognize, using context, where substitutions have occurred

## 2020-02-10 NOTE — PROGRESS NOTES
Progress Note - Brandon Seo 1955, 75 y o  male MRN: [id number]  Unit/Bed#: [facility] [address] 2 [address] [place] 87 213-01 Encounter: [id number]  Primary Care Provider: Prince Grimes DO   Date and time admitted to hospital: 2/7/2020  4:09 PM    * Diabetes mellitus with neuropathy [facility])  Assessment & Plan  Lab Results   Component Value Date    HGBA1C 6 6 02/03/2020       Recent Labs     02/09/20  1621 02/09/20  2102 02/10/20  0722 02/10/20  1139   POCGLU 173* 139 122 115       Blood Sugar Average: Last 72 hrs:  (P) 628 [id number]   · Well controlled per last A1c   · Maintained on Xultophy (combination drug with basal insulin) 19 units daily, Metformin 500 mg BID  · Hold oral agent while here  · Monitor FSBS ac / hs and cover with SSI  No basal at this time, euglycemic  Adjust insulin doses as needed for tight glucose control for wound healing   · NPO currently, place back on ADA diet post operatively     Osteomyelitis of foot, left, acute (HCC)  Assessment & Plan  · Podiatry primary team  · With recent hospitalization, S/P left hallux amputation (DOS: 1/8/20) and S/P left foot I&D with removal of sesamoids (DOS: 1/12/20) and now with wound dehiscence  · To OR today for left partial 1st ray amputation   · Had MSSA bacteremia, treated until yesterday with IV Ancef  PICC in place  Repeat blood cultures to be performed after 2/20 to ensure eradication    · ID consulted, Currently on Cefepime    Essential hypertension  Assessment & Plan  · Maintained on amlodipine, metoprolol, and losartan  · Hold losartan for now with recent ELZBIETA  · Nephrology following   · Monitor VS    Acute diastolic (congestive) heart failure (HCC)  Assessment & Plan  Wt Readings from Last 3 Encounters:   02/10/20 96 2 kg (212 lb 1 3 oz)   02/07/20 95 8 kg (211 lb 3 2 oz)   01/29/20 94 5 kg (208 lb 5 4 oz)     · Maintained on Lasix 40 mg BID and Zaroxolyn as needed  · Echo 1/10/20 EF 55%, moderate MS, mild MR, moderate AR, mild AS, mild to moderate TR, moderate pulmonary hypertension   · Nephrology onboard, recommendations and diuretic management appreciated   · on lasix 40mg bid   · Monitor volume status closely isaias operatively   · Seen by Cardiology pre operatively   · No acute complaints currently   · Daily weight / I&O          Anxiety  Assessment & Plan  · Maintained on Ativan as needed      Complete heart block ([facility] [address] )  Assessment & Plan  · S/p pacemaker implantation  · Cardiology seen pre operatively     Hyperlipidemia  Assessment & Plan  · Maintained on simvastatin and Lovaza  · Continue    Iron deficiency anemia  Assessment & Plan  · On iron supplementation  · Monitor H&H isaias operatively and transfuse as needed     Lab Results   Component Value Date    HGB 7 9 (L) 02/10/2020    HGB 7 8 (L) 02/09/2020    HGB 7 5 (L) 02/08/2020         NICOLASA (obstructive sleep apnea)  Assessment & Plan  · Maintained on home CPAP therapy  · Continue    PAF (paroxysmal atrial fibrillation) ([facility])  Assessment & Plan  · Maintained on Metoprolol 50 mg BID and Eliquis  · Eliquis on hold for pending surgery  · Cardiology consulted  · Resume Eliquis post operatively as soon as acceptable from podiatry standpoint     Peripheral arterial disease ([facility] )  Assessment & Plan  · Recent LEAD's on 1/13 with diffuse disease without focal stenosis  · Maintained on Lovaza and simvastatin    Stage 3 chronic kidney disease (HCC)  Assessment & Plan  · Baseline variable, most recent 1 9-2 since last admission  · Nephrology on board - continue with recommendations   · BMP in AM   · Monitor renal function isaias operatively     Lab Results   Component Value Date    CREATININE 1 86 (H) 02/10/2020    CREATININE 1 93 (H) 02/09/2020    CREATININE 2 02 (H) 02/08/2020         Gout  Assessment & Plan  · Maintained on Allopurinol        VTE Pharmacologic Prophylaxis:   Pharmacologic: Heparin  Mechanical VTE Prophylaxis in Place: No    Patient Centered Rounds: I have performed bedside rounds with nursing staff today     Discussions with Specialists or Other Care Team Provider:     Education and Discussions with Family / Patient: patient, wife at bedside     Time Spent for Care: 20 minutes  More than 50% of total time spent on counseling and coordination of care as described above  Current Length of Stay: 3 day(s)    Current Patient Status: Inpatient   Certification Statement: The patient will continue to require additional inpatient hospital stay due to OM of left foot     Discharge Plan: pending per podiatry     Code Status: Level 1 - Full Code      Subjective:   patient going for surgery today  No acute complaints  No chest pain or SOB  No lightheadedness or dizziness  Objective:     Vitals:   Temp (24hrs), Av 9 °F (36 6 °C), Min:97 8 °F (36 6 °C), Max:98 °F (36 7 °C)    Temp:  [97 8 °F (36 6 °C)-98 °F (36 7 °C)] 97 8 °F (36 6 °C)  HR:  [67-79] 71  Resp:  [18] 18  BP: ()/(61-70) 154/70  SpO2:  [89 %-97 %] 94 %  Body mass index is 30 43 kg/m²  Input and Output Summary (last 24 hours):     No intake or output data in the 24 hours ending 02/10/20 1203    Physical Exam:     Physical Exam   Constitutional: No distress  HENT:   Head: Normocephalic  Eyes: Conjunctivae are normal    Neck: Neck supple  Cardiovascular: Normal rate and regular rhythm  Murmur heard  Pulmonary/Chest: Effort normal and breath sounds normal  No respiratory distress  He has no wheezes  He has no rales  Abdominal: Soft  Bowel sounds are normal    Musculoskeletal: He exhibits edema (pitting bilaterally LE ) and deformity (dressing and surgical show on left foot )  Neurological: He is alert  Psychiatric: He has a normal mood and affect  Nursing note and vitals reviewed          Additional Data:     Labs:    Results from last 7 days   Lab Units 02/10/20  0502   WBC Thousand/uL 8 60   HEMOGLOBIN g/dL 7 9*   HEMATOCRIT % 25 6*   PLATELETS Thousands/uL 284   NEUTROS PCT % 55   LYMPHS PCT % 15   MONOS PCT % 10   EOS PCT % 19*     Results from last 7 days   Lab Units 02/10/20  0502  02/07/20  1715   SODIUM mmol/L 137   < > 134*   POTASSIUM mmol/L 4 9   < > 5 3   CHLORIDE mmol/L 105   < > 102   CO2 mmol/L 22   < > 23   BUN mg/dL 49*   < > 72*   CREATININE mg/dL 1 86*   < > 2 05*   ANION GAP mmol/L 10   < > 9   CALCIUM mg/dL 8 3   < > 8 6   ALBUMIN g/dL  --   --  2 3*   TOTAL BILIRUBIN mg/dL  --   --  0 33   ALK PHOS U/L  --   --  88   ALT U/L  --   --  8*   AST U/L  --   --  14   GLUCOSE RANDOM mg/dL 86   < > 101    < > = values in this interval not displayed  Results from last 7 days   Lab Units 02/10/20  1139 02/10/20  0722 02/09/20  2102 02/09/20  1621 02/09/20  1127 02/09/20  0756 02/08/20  2121 02/08/20  1613 02/08/20  1055 02/08/20  0733 02/07/20  2136 02/07/20  2105   POC GLUCOSE mg/dl 115 122 139 173* 145* 98 131 153* 120 82 135 83                   * I Have Reviewed All Lab Data Listed Above  * Additional Pertinent Lab Tests Reviewed:  All Labs Within Last 24 Hours Reviewed    Imaging:    Imaging Reports Reviewed Today Include:   Imaging Personally Reviewed by Myself Includes:  none    Recent Cultures (last 7 days):           Last 24 Hours Medication List:     Current Facility-Administered Medications:  [MAR Hold] acetaminophen 650 mg Oral Q6H PRN Antonella Guevara DPM    [MAR Hold] allopurinol 300 mg Oral QAM Antonella Guevara DPM    [MAR Hold] amLODIPine 10 mg Oral Daily PAT Tan    [facility] Hold] cefepime 1,000 mg Intravenous Q12H Timoteo Nguyễn MD Last Rate: 1,000 mg (02/10/20 0457)   [MAR Hold] ferrous gluconate 324 mg Oral BID DAVID Hinds DPM    [MAR Hold] fish oil 1,000 mg Oral BID Antonella Guevara DPM    [MAR Hold] furosemide 40 mg Oral Daily Eladio Patton DO    [facility] Hold] heparin (porcine) 5,000 Units Subcutaneous Q8H [address], DPM    [facility] Hold] insulin lispro 1-6 Units Subcutaneous TID AC Azalea Hinds DPM    [MAR Hold] insulin lispro 1-6 Units Subcutaneous HS Azalea Hinds DPM    [MAR Hold] LORazepam 0 5 mg Oral Q8H PRN Arcenio Sauce, DPM    [MAR Hold] metoprolol tartrate 50 mg Oral Q12H [address] Arcneio Sauce, DPM    [MAR Hold] multivitamin-minerals 1 tablet Oral Daily Norris Sauce, DPM    [MAR Hold] oxyCODONE 5 mg Oral Q6H PRN Arcenio Sauce, DPM    [MAR Hold] pravastatin 20 mg Oral Daily With Vivar LIZBETH Evans    [MAR Hold] tamsulosin 0 4 mg Oral Daily With Vivar LIZBETH Evans         Today, Patient Was Seen By: Татьяна Nelson PA-C    ** Please Note: Dictation voice to text software may have been used in the creation of this document   **

## 2020-02-10 NOTE — ASSESSMENT & PLAN NOTE
Wt Readings from Last 3 Encounters:   02/10/20 96 2 kg (212 lb 1 3 oz)   02/07/20 95 8 kg (211 lb 3 2 oz)   01/29/20 94 5 kg (208 lb 5 4 oz)     · Maintained on Lasix 40 mg BID and Zaroxolyn as needed  · Echo 1/10/20 EF 55%, moderate MS, mild MR, moderate AR, mild AS, mild to moderate TR, moderate pulmonary hypertension   · Nephrology onboard, recommendations and diuretic management appreciated   · on lasix 40mg bid   · Monitor volume status closely isaias operatively   · Seen by Cardiology pre operatively   · No acute complaints currently   · Daily weight / I&O

## 2020-02-10 NOTE — ASSESSMENT & PLAN NOTE
· On iron supplementation  · Monitor H&H isaias operatively and transfuse as needed     Lab Results   Component Value Date    HGB 7 9 (L) 02/10/2020    HGB 7 8 (L) 02/09/2020    HGB 7 5 (L) 02/08/2020

## 2020-02-10 NOTE — NURSING NOTE
Patients medications listed as MAR HOLD as of 1107, in computer  BG at 1139= 115 mg/dl   MAR Hold still listed and JAYME Reyes ([facility]) made aware of MAR hold at [phone], was instructed to notify primary, podiatry  Did check with pharmacy and was instructed this was an issue the physician would need to correct  Messaged Podiatry resident at [phone], Chilo Russell, and made aware of MAR hold  Unable to administer any medications until this has been corrected  No new orders at this time, Will monitor  MAR hold remains at this time

## 2020-02-10 NOTE — ASSESSMENT & PLAN NOTE
· Podiatry primary team  · With recent hospitalization, S/P left hallux amputation (DOS: 1/8/20) and S/P left foot I&D with removal of sesamoids (DOS: 1/12/20) and now with wound dehiscence  · To OR today for left partial 1st ray amputation   · Had MSSA bacteremia, treated until yesterday with IV Ancef  PICC in place  Repeat blood cultures to be performed after 2/20 to ensure eradication    · ID consulted, Currently on Cefepime

## 2020-02-10 NOTE — ANESTHESIA PREPROCEDURE EVALUATION
Review of Systems/Medical History  Patient summary reviewed  Chart reviewed  No history of anesthetic complications     Cardiovascular  EKG reviewed, Pacemaker/AICD, Hyperlipidemia, Hypertension , Valvular heart disease , mitral valve stenosis, Dysrhythmias , atrial fibrillation and 3rd degree block, CHF compensated CHF,   Comment: Pacer 2018 for complete heart block   V paced currently     LEFT VENTRICLE:  Systolic function was normal by visual assessment  Ejection fraction was estimated to be 55 %  There were no regional wall motion abnormalities  Wall thickness was mildly increased  There was mild concentric hypertrophy      LEFT ATRIUM:  The atrium was moderately dilated      MITRAL VALVE:  There was marked annular calcification  There was marked calcification of the posterior leaflet, with mild chordal involvement  There was severely restricted mobility of the posterior leaflet  There was moderate stenosis  Mean gradient of 5 6 mmHg at heart rate of 60 bpm   There was mild regurgitation      AORTIC VALVE:  There was mild stenosis  VMax 2 28 m/s, Mean Gradient 11 50 mmHg, FITO 2 1 cm2, DI 0 66  There was moderate regurgitation  AI PHT 456ms      TRICUSPID VALVE:  There was mild to moderate regurgitation  Estimated peak PA pressure was 59 mmHg    The findings suggest moderate pulmonary hypertension    ,  Pulmonary  Smoker ex-smoker  Cumulative Pack Years: 30, Shortness of breath, Recent URI , Sleep apnea CPAP,   Comment: History of acute hypoxic respiratory failure    Resolving pulm edema and pleural effusions thought to be cardiac related      GI/Hepatic  Negative GI/hepatic ROS          Chronic kidney disease stage 3,        Endo/Other  Diabetes poorly controlled type 2 Insulin,   Obesity    GYN  Negative gynecology ROS          Hematology  Anemia ,     Musculoskeletal    Comment: Shoulder arthroscopies Arthritis     Neurology  Negative neurology ROS      Psychology   Negative psychology ROS Physical Exam    Airway    Mallampati score: III  TM Distance: >3 FB  Neck ROM: full     Dental   upper dentures,     Cardiovascular  Rhythm: regular, Rate: normal, Murmur,     Pulmonary  Comment:     , Decreased breath sounds,     Other Findings        Anesthesia Plan  ASA Score- 3     Anesthesia Type- IV sedation with anesthesia and general with ASA Monitors  Additional Monitors:   Airway Plan:         Plan Factors-  Patient did not smoke on day of surgery  Induction- intravenous  Postoperative Plan-     Informed Consent- Anesthetic plan and risks discussed with patient

## 2020-02-10 NOTE — ASSESSMENT & PLAN NOTE
· Baseline variable, most recent 1 9-2 since last admission  · Nephrology on board - continue with recommendations   · BMP in AM   · Monitor renal function isaias operatively     Lab Results   Component Value Date    CREATININE 1 86 (H) 02/10/2020    CREATININE 1 93 (H) 02/09/2020    CREATININE 2 02 (H) 02/08/2020

## 2020-02-10 NOTE — PLAN OF CARE
Problem: Potential for Falls  Goal: Patient will remain free of falls  Description  INTERVENTIONS:  - Assess patient frequently for physical needs  -  Identify cognitive and physical deficits and behaviors that affect risk of falls    -  [place] fall precautions as indicated by assessment   - Educate patient/family on patient safety including physical limitations  - Instruct patient to call for assistance with activity based on assessment  - Modify environment to reduce risk of injury  - Consider OT/PT consult to assist with strengthening/mobility  Outcome: Progressing     Problem: INFECTION - ADULT  Goal: Absence or prevention of progression during hospitalization  Description  INTERVENTIONS:  - Assess and monitor for signs and symptoms of infection  - Monitor lab/diagnostic results  - Monitor all insertion sites, i e  indwelling lines, tubes, and drains  - Monitor endotracheal if appropriate and nasal secretions for changes in amount and color  - [place] appropriate cooling/warming therapies per order  - Administer medications as ordered  - Instruct and encourage patient and family to use good hand hygiene technique  - Identify and instruct in appropriate isolation precautions for identified infection/condition  Outcome: Progressing     Problem: SAFETY ADULT  Goal: Maintain or return to baseline ADL function  Description  INTERVENTIONS:  -  Assess patient's ability to carry out ADLs; assess patient's baseline for ADL function and identify physical deficits which impact ability to perform ADLs (bathing, care of mouth/teeth, toileting, grooming, dressing, etc )  - Assess/evaluate cause of self-care deficits   - Assess range of motion  - Assess patient's mobility; develop plan if impaired  - Assess patient's need for assistive devices and provide as appropriate  - Encourage maximum independence but intervene and supervise when necessary  - Involve family in performance of ADLs  - Assess for home care needs following discharge   - Consider OT consult to assist with ADL evaluation and planning for discharge  - Provide patient education as appropriate  Outcome: Progressing  Goal: Maintain or return mobility status to optimal level  Description  INTERVENTIONS:  - Assess patient's baseline mobility status (ambulation, transfers, stairs, etc )    - Identify cognitive and physical deficits and behaviors that affect mobility  - Identify mobility aids required to assist with transfers and/or ambulation (gait belt, sit-to-stand, lift, walker, cane, etc )  - [place] fall precautions as indicated by assessment  - Record patient progress and toleration of activity level on Mobility SBAR; progress patient to next Phase/Stage  - Instruct patient to call for assistance with activity based on assessment  - Consider rehabilitation consult to assist with strengthening/weightbearing, etc   Outcome: Progressing     Problem: DISCHARGE PLANNING  Goal: Discharge to home or other facility with appropriate resources  Description  INTERVENTIONS:  - Identify barriers to discharge w/patient and caregiver  - Arrange for needed discharge resources and transportation as appropriate  - Identify discharge learning needs (meds, wound care, etc )  - Arrange for interpretive services to assist at discharge as needed  - Refer to Case Management Department for coordinating discharge planning if the patient needs post-hospital services based on physician/advanced practitioner order or complex needs related to functional status, cognitive ability, or social support system  Outcome: Progressing     Problem: SKIN/TISSUE INTEGRITY - ADULT  Goal: Skin integrity remains intact  Description  INTERVENTIONS  - Identify patients at risk for skin breakdown  - Assess and monitor skin integrity  - Assess and monitor nutrition and hydration status  - Monitor labs (i e  albumin)  - Assess for incontinence   - Turn and reposition patient  - Assist with mobility/ambulation  - Relieve pressure over bony prominences  - Avoid friction and shearing  - Provide appropriate hygiene as needed including keeping skin clean and dry  - Evaluate need for skin moisturizer/barrier cream  - Collaborate with interdisciplinary team (i e  Nutrition, Rehabilitation, etc )   - Patient/family teaching  Outcome: Progressing  Goal: Incision(s), wounds(s) or drain site(s) healing without S/S of infection  Description  INTERVENTIONS  - Assess and document risk factors for skin impairment   - Assess and document dressing, incision, wound bed, drain sites and surrounding tissue  - Consider nutrition services referral as needed  - Oral mucous membranes remain intact  - Provide patient/ family education  Outcome: Progressing  Goal: Oral mucous membranes remain intact  Description  INTERVENTIONS  - Assess oral mucosa and hygiene practices  - Implement preventative oral hygiene regimen  - Implement oral medicated treatments as ordered  - Initiate Nutrition services referral as needed  Outcome: Progressing     Problem: Prexisting or High Potential for Compromised Skin Integrity  Goal: Skin integrity is maintained or improved  Description  INTERVENTIONS:  - Identify patients at risk for skin breakdown  - Assess and monitor skin integrity  - Assess and monitor nutrition and hydration status  - Monitor labs   - Assess for incontinence   - Turn and reposition patient  - Assist with mobility/ambulation  - Relieve pressure over bony prominences  - Avoid friction and shearing  - Provide appropriate hygiene as needed including keeping skin clean and dry  - Evaluate need for skin moisturizer/barrier cream  - Collaborate with interdisciplinary team   - Patient/family teaching  - Consider wound care consult   Outcome: Progressing

## 2020-02-10 NOTE — ASSESSMENT & PLAN NOTE
Lab Results   Component Value Date    HGBA1C 6 6 02/03/2020       Recent Labs     02/09/20  1621 02/09/20  2102 02/10/20  0722 02/10/20  1139   POCGLU 173* 139 122 115       Blood Sugar Average: Last 72 hrs:  (P) 580 [id number]   · Well controlled per last A1c   · Maintained on Xultophy (combination drug with basal insulin) 19 units daily, Metformin 500 mg BID  · Hold oral agent while here  · Monitor FSBS ac / hs and cover with SSI  No basal at this time, euglycemic    Adjust insulin doses as needed for tight glucose control for wound healing   · NPO currently, place back on ADA diet post operatively

## 2020-02-10 NOTE — ASSESSMENT & PLAN NOTE
· Maintained on Metoprolol 50 mg BID and Eliquis  · Eliquis on hold for pending surgery  · Cardiology consulted  · Resume Eliquis post operatively as soon as acceptable from podiatry standpoint

## 2020-02-10 NOTE — PROGRESS NOTES
Progress Note - Infectious Disease   Carl Jeter 75 y o  male MRN: [id number]  Unit/Bed#: [place] [address] -01 Encounter: [id number]      Impression/Recommendations:  1  Left foot wound infection  Outpatient wound culture with growth of MDR E coli  I suspect this is super infection in a patient with open wounds on long-term IV cefazolin, which probably selected out this MDR pathogen  Patient is clinically and systemically well, without evidence of sepsis or systemic toxicity  Continue IV cefepime  Serial foot exams  Transition to p o  antibiotic after metatarsal amputation      2  Left 1st MT head osteomyelitis, evident on x-ray  This is most likely secondary to super infection above  Plan for ray resection noted  Plan for ray resection later today noted  Patient will not need long-term antibiotic if infected bone can be resected in entirety      3  CKD stage 3  Creatinine baseline  Antibiotic dosages adjusted accordingly  Monitor creatinine      4  DM with neuropathy  Hemoglobin A1c is mildly elevated but can be better controlled      Discussed with patient and his wife in detail regarding the above plan  Antibiotics:  Cefepime # 4     Subjective:  Patient feels well  No foot pain  Temperature stays down  No chills  He is tolerating antibiotic well  No nausea, vomiting or diarrhea  Objective:  Vitals:  Temp:  [97 8 °F (36 6 °C)-98 °F (36 7 °C)] 97 8 °F (36 6 °C)  HR:  [67-79] 71  Resp:  [18] 18  BP: ()/(61-70) 154/70  SpO2:  [89 %-97 %] 94 %  Temp (24hrs), Av 9 °F (36 6 °C), Min:97 8 °F (36 6 °C), Max:98 °F (36 7 °C)  Current: Temperature: 97 8 °F (36 6 °C)    Physical Exam:     General: Awake, alert, cooperative, no distress  Neck:  Supple  No mass  No lymphadenopathy  Lungs: Expansion symmetric, no rales, no wheezing, respirations unlabored  Heart:  Regular rate and rhythm, S1 and S2 normal, no murmur     Abdomen: Soft, nondistended, non-tender, bowel sounds active all four quadrants, no masses, no organomegaly  Extremities: Trace leg edema  Left foot with dressing in place  Dressing is dry  No erythema/warmth beyond dressing  No tenderness  Skin:  No rash  Neuro: Moves all extremities  Invasive Devices     Peripherally Inserted Central Catheter Line            PICC Line 60/84/26 Right Basilic 27 days                Labs studies:   I have personally reviewed pertinent labs  Results from last 7 days   Lab Units 02/10/20  0502 02/09/20  0528 02/08/20  0523 02/07/20  1715   POTASSIUM mmol/L 4 9 5 1 5 0 5 3   CHLORIDE mmol/L 105 105 106 102   CO2 mmol/L 22 22 22 23   BUN mg/dL 49* 57* 66* 72*   CREATININE mg/dL 1 86* 1 93* 2 02* 2 05*   EGFR ml/min/1 73sq m 37 35 33 33   CALCIUM mg/dL 8 3 8 7 8 2* 8 6   AST U/L  --   --   --  14   ALT U/L  --   --   --  8*   ALK PHOS U/L  --   --   --  88     Results from last 7 days   Lab Units 02/10/20  0502 02/09/20  0528 02/08/20  0523   WBC Thousand/uL 8 60 9 88 9 29   HEMOGLOBIN g/dL 7 9* 7 8* 7 5*   PLATELETS Thousands/uL 284 297 292           Imaging Studies:   I have personally reviewed pertinent imaging study reports and images in PACS  Left foot x-ray reviewed personally  Osteomyelitis of distal 1st metatarsal   CXR reviewed personally  No consolidation  EKG, Pathology, and Other Studies:   I have personally reviewed pertinent reports

## 2020-02-10 NOTE — OP NOTE
OPERATIVE REPORT  PATIENT NAME: Carl Jeter    :  1955  MRN: [id number]  Pt Location: [facility] OR ROOM 01    SURGERY DATE: 2/10/2020    Surgeon(s) and Role:     * Divina Lopes DPM - Primary     * Lazarus Barton, DPM - Assisting    Preop Diagnosis:  Osteomyelitis of foot, left, acute ([facility] [address] ) [M86 172]    Post-Op Diagnosis Codes:     * Osteomyelitis of foot, left, acute ([facility] [address] ) [M86 172]    Procedure(s) (LRB):  PARTIAL 1ST RAY AMPUTATION (Left)    Specimen(s):  ID Type Source Tests Collected by Time Destination   1 : proximal bone biopsy, first metatarsal Tissue Bone TISSUE EXAM Divina Lopes DPM 2/10/2020 181    2 :  left first metatarsal Tissue Bone TISSUE EXAM Divina Lopes DPM 2/10/2020 181    A :  Wound Wound WOUND CULTURE Divina Lopes DPM 2/10/2020 180    B :  Wound Wound ANAEROBIC CULTURE AND GRAM STAIN, WOUND CULTURE Divina Lopes DPM 2/10/2020 180    C : proximal bone  culture, left first metatarsal Tissue Bone CULTURE, TISSUE AND GRAM STAIN Divina Lopes DPM 2/10/2020 181        Estimated Blood Loss:   Minimal    Drains:  * No LDAs found *    Anesthesia Type:   Choice    Operative Indications:  Osteomyelitis of foot, left, acute ([facility] [address] ) [V50 972]    Operative Findings:  Post resection the distal 1st metatarsal bone appeared hard to touch and viable  No purulent drainage was observed  Consider surgical cure of osteomyelitis  Injectables:  10 cc of 0 25% Marcaine plain and 1% lidocaine plain    Materials:  4 0 nylon sutures    Hemostasis:  Left pneumatic ankle tourniquet at 250 mmHg for approximately 32 minutes    Complications:   None    Procedure and Technique:    Under mild sedation, the patient was brought into the operating room and placed on the operating room table in the supine position  A pneumatic ankle tourniquet was then placed around the patient's left ankle with ample webril padding  A time out was performed to confirm the correct patient, procedure and site with all parties in agreement   Following IV sedation, an ankle block was performed consisting of 10 ml of 1% Lidocaine and 0 25% Bupivacaine in a 1:1 mixture  The foot was then scrubbed, prepped and draped in the usual aseptic manner  An esmarch bandage was utilized to exsangunate the patients foot and the pneumatic ankle tourniquet was then inflated  The esmarch bandage was removed and the foot was placed on the operating room table  A linear incision was made on the dorsal aspect of the 1st metatarsal shaft circumferentially to the open wounds  Utilizing a #15 blade a full thickness incision was made down to bone  The 1st metatarsal shaft was sharply dissected out to isolate the shaft  Utilizing a key elevator soft tissues were freed from the metatarsal  A sagittal saw was then used to make the transmetatarsal amputation osteotomy  The metatarsal was cut in a dorsal distal to plantar proximal fashion  The resected metatarsal was then removed from the table and passed off  Proximal margins of 1st metatarsal were then taken and sent for bone culture and bone biopsy  Anaerobic and aerobic wound cultures were taken and sent for microbiology  The remaining wound bed was then inspected  No purulent sinus tracts were visualized  Any necrotic or nonviable soft tissues were sharply excised from the wound utilizing metzenbaum scissors  Any residual exposed tendons were excised proximally  The wound was then flushed with copious amounts of normal sterile saline  Skin edges were reapproximated and closure was obtained utilizing interrupted retention sutures utilizing 4 0 nylon   The foot was then cleansed and dried  The incision site was dressed with Xeroform  This was then covered with adequate dry sterile dressing with padding, and a Kerlix and Ace Wrap  The pneumatic ankle tourniquet was deflated and normal hyperemic flush was noted to the plantar flap       As with many limb salvage procedures, we contemplate the possibility of performing further stages to this procedure  Procedures may include debridements, delayed closure, plastic surgery techniques, or more proximal amputations  This procedure may be considered part of a multi-staged limb salvage treatment plan  The patient tolerated the procedure and anesthesia well and was transported to the PACU with vital signs stable  Dr Arcelia Lauren was present during the entire procedure and participated in all key aspects      Patient Disposition:  PACU  and hemodynamically stable    SIGNATURE: Alysa Cook DPM  DATE: February 10, 2020  TIME: 6:45 PM

## 2020-02-10 NOTE — QUICK NOTE
Patient to OR at 1700 hours for left partial 1st ray amputation with Dr Tree Ibarra  Will make patient NPO effective immediately, and cancel ceretec scan  Will obtain consent later today

## 2020-02-10 NOTE — PLAN OF CARE
Problem: Potential for Falls  Goal: Patient will remain free of falls  Description  INTERVENTIONS:  - Assess patient frequently for physical needs  -  Identify cognitive and physical deficits and behaviors that affect risk of falls    -  [place] fall precautions as indicated by assessment   - Educate patient/family on patient safety including physical limitations  - Instruct patient to call for assistance with activity based on assessment  - Modify environment to reduce risk of injury  - Consider OT/PT consult to assist with strengthening/mobility  Outcome: Progressing     Problem: INFECTION - ADULT  Goal: Absence or prevention of progression during hospitalization  Description  INTERVENTIONS:  - Assess and monitor for signs and symptoms of infection  - Monitor lab/diagnostic results  - Monitor all insertion sites, i e  indwelling lines, tubes, and drains  - Monitor endotracheal if appropriate and nasal secretions for changes in amount and color  - [place] appropriate cooling/warming therapies per order  - Administer medications as ordered  - Instruct and encourage patient and family to use good hand hygiene technique  - Identify and instruct in appropriate isolation precautions for identified infection/condition  Outcome: Progressing     Problem: SAFETY ADULT  Goal: Maintain or return to baseline ADL function  Description  INTERVENTIONS:  -  Assess patient's ability to carry out ADLs; assess patient's baseline for ADL function and identify physical deficits which impact ability to perform ADLs (bathing, care of mouth/teeth, toileting, grooming, dressing, etc )  - Assess/evaluate cause of self-care deficits   - Assess range of motion  - Assess patient's mobility; develop plan if impaired  - Assess patient's need for assistive devices and provide as appropriate  - Encourage maximum independence but intervene and supervise when necessary  - Involve family in performance of ADLs  - Assess for home care needs following discharge   - Consider OT consult to assist with ADL evaluation and planning for discharge  - Provide patient education as appropriate  Outcome: Progressing  Goal: Maintain or return mobility status to optimal level  Description  INTERVENTIONS:  - Assess patient's baseline mobility status (ambulation, transfers, stairs, etc )    - Identify cognitive and physical deficits and behaviors that affect mobility  - Identify mobility aids required to assist with transfers and/or ambulation (gait belt, sit-to-stand, lift, walker, cane, etc )  - [place] fall precautions as indicated by assessment  - Record patient progress and toleration of activity level on Mobility SBAR; progress patient to next Phase/Stage  - Instruct patient to call for assistance with activity based on assessment  - Consider rehabilitation consult to assist with strengthening/weightbearing, etc   Outcome: Progressing     Problem: DISCHARGE PLANNING  Goal: Discharge to home or other facility with appropriate resources  Description  INTERVENTIONS:  - Identify barriers to discharge w/patient and caregiver  - Arrange for needed discharge resources and transportation as appropriate  - Identify discharge learning needs (meds, wound care, etc )  - Arrange for interpretive services to assist at discharge as needed  - Refer to Case Management Department for coordinating discharge planning if the patient needs post-hospital services based on physician/advanced practitioner order or complex needs related to functional status, cognitive ability, or social support system  Outcome: Progressing     Problem: SKIN/TISSUE INTEGRITY - ADULT  Goal: Skin integrity remains intact  Description  INTERVENTIONS  - Identify patients at risk for skin breakdown  - Assess and monitor skin integrity  - Assess and monitor nutrition and hydration status  - Monitor labs (i e  albumin)  - Assess for incontinence   - Turn and reposition patient  - Assist with mobility/ambulation  - Relieve pressure over bony prominences  - Avoid friction and shearing  - Provide appropriate hygiene as needed including keeping skin clean and dry  - Evaluate need for skin moisturizer/barrier cream  - Collaborate with interdisciplinary team (i e  Nutrition, Rehabilitation, etc )   - Patient/family teaching  Outcome: Progressing  Goal: Incision(s), wounds(s) or drain site(s) healing without S/S of infection  Description  INTERVENTIONS  - Assess and document risk factors for skin impairment   - Assess and document dressing, incision, wound bed, drain sites and surrounding tissue  - Consider nutrition services referral as needed  - Oral mucous membranes remain intact  - Provide patient/ family education  Outcome: Progressing  Goal: Oral mucous membranes remain intact  Description  INTERVENTIONS  - Assess oral mucosa and hygiene practices  - Implement preventative oral hygiene regimen  - Implement oral medicated treatments as ordered  - Initiate Nutrition services referral as needed  Outcome: Progressing     Problem: Prexisting or High Potential for Compromised Skin Integrity  Goal: Skin integrity is maintained or improved  Description  INTERVENTIONS:  - Identify patients at risk for skin breakdown  - Assess and monitor skin integrity  - Assess and monitor nutrition and hydration status  - Monitor labs   - Assess for incontinence   - Turn and reposition patient  - Assist with mobility/ambulation  - Relieve pressure over bony prominences  - Avoid friction and shearing  - Provide appropriate hygiene as needed including keeping skin clean and dry  - Evaluate need for skin moisturizer/barrier cream  - Collaborate with interdisciplinary team   - Patient/family teaching  - Consider wound care consult   Outcome: Progressing

## 2020-02-10 NOTE — PROGRESS NOTES
NEPHROLOGY PROGRESS NOTE   Carl Jeter 75 y o  male MRN: [id number]  Unit/Bed#: [place] [address] -01 Encounter: [id number]      [address] [facility] (primary) hypertension  - blood pressures at home are averaging in the 783-590 systolic range  - given his age and comorbidities his goal blood pressure should be 125 to 135 over 70-80  - he is currently on amlodipine 10 mg daily, metoprolol 50 mg twice daily, losartan was decreased to 25 mg daily and chlorthalidone was discontinued in the past  -with some increasing creatinine, losartan now held  -blood pressures are in the 105-501 systolic range  -some signs of mild volume as well in the lower extremity  -continue furosemide 40 mg p o  B i d , metoprolol 50 mg every 12 hours, holding a m   Amlodipine for surgery     Persistent proteinuria  - in the setting of prolonged diabetes  -he does have nephrotic range proteinuria serologic workup was negative in the past    Nephropathy  -serologies negative will monitor at this point     Gout  - on allopurinol 300 mg daily  - has been gout free, will monitor and continue     Bilateral leg edema  - left greater than right,  - continues on furosemide 40 mg 2 times daily will hold evening dose of furosemide night and reinitiate tomorrow, if acutely short of breath give 40 mg of IV Lasix     Left renal artery stenosis  -renal artery Doppler showed 60% stenosis  -can be indication and cause of flash pulmonary edema  -mitral valve disease also present     Left foot osteomyelitis, peripheral vascular disease  -for surgery today    SUBJECTIVE:    Patient was seen today, denies any acute chest pain or shortness breath fevers or chills no difficulties with urination    OBJECTIVE:  Current Weight: Weight - Scale: 96 2 kg (212 lb 1 3 oz)  Vitals:    02/10/20 0916   BP: 154/70   Pulse:    Resp:    Temp:    SpO2:      No intake or output data in the 24 hours ending 02/10/20 1026  Weight (last 2 days)     Date/Time   Weight 02/10/20 0600   96 2 (212 08)    02/09/20 0600   96 5 (212 74)    02/08/20 1406   96 7 (213 19)    02/08/20 1311   96 7 (213 19)              General: conscious, cooperative, in not acute distress  Eyes: conjunctivae pink, anicteric sclerae  ENT: lips and mucous membranes moist  Neck: supple, no JVD  Chest: clear breath sounds bilateral, no crackles, ronchus or wheezings  CVS: distinct S1 & S2, normal rate, regular rhythm  Abdomen: soft, non-tender, non-distended, normoactive bowel sounds  Extremities:  +edema bilaterally  Skin: no rash  Neuro: awake, alert, oriented      Medications:    Current Facility-Administered Medications:     acetaminophen (TYLENOL) tablet 650 mg, 650 mg, Oral, Q6H PRN, Alfredo Hash, DPM    allopurinol (ZYLOPRIM) tablet 300 mg, 300 mg, Oral, QAM, Alfredo Hash, DPM, 300 mg at 02/10/20 0917    amLODIPine (NORVASC) tablet 10 mg, 10 mg, Oral, Daily, Alpha , CRNP, Stopped at 02/10/20 0917    cefepime (MAXIPIME) 1,000 mg in dextrose 5 % 50 mL IVPB, 1,000 mg, Intravenous, Q12H, Cat Silva MD, Last Rate: 100 mL/hr at 02/10/20 0457, 1,000 mg at 02/10/20 0457    ferrous gluconate (FERGON) tablet 324 mg, 324 mg, Oral, BID AC, Azalea Hinds, DPM, 324 mg at 02/10/20 0917    fish oil capsule 1,000 mg, 1,000 mg, Oral, BID, Alfredo Hash, DPM, 1,000 mg at 02/10/20 0917    furosemide (LASIX) tablet 40 mg, 40 mg, Oral, BID (diuretic), Alfredo Hash, DPM, Stopped at 02/10/20 0839    heparin (porcine) subcutaneous injection 5,000 Units, 5,000 Units, Subcutaneous, Q8H [address], Stopped at 02/10/20 1400 **AND** [CANCELED] Platelet count, , , Once, Alfredo Hash, DPM    insulin lispro (HumaLOG) 100 units/mL subcutaneous injection 1-6 Units, 1-6 Units, Subcutaneous, TID AC, 1 Units at 02/09/20 1640 **AND** Fingerstick Glucose (POCT), , , TID AC, Azalea Hinds, DPM    insulin lispro (HumaLOG) 100 units/mL subcutaneous injection 1-6 Units, 1-6 Units, Subcutaneous, HS, Azalea Hinds, DPM    LORazepam (ATIVAN) tablet 0 5 mg, 0 5 mg, Oral, Q8H PRN, Jacy Chant, DPM, 0 5 mg at 02/09/20 2121    metoprolol tartrate (LOPRESSOR) tablet 50 mg, 50 mg, Oral, Q12H [address], Azalea Hinds, DPM, 50 mg at 02/10/20 0917    multivitamin-minerals (CENTRUM) tablet 1 tablet, 1 tablet, Oral, Daily, Jacy Chant, DPM, 1 tablet at 02/10/20 0917    oxyCODONE (ROXICODONE) IR tablet 5 mg, 5 mg, Oral, Q6H PRN, Jacy Chant, DPM, 5 mg at 02/07/20 1824    pravastatin (PRAVACHOL) tablet 20 mg, 20 mg, Oral, Daily With Dinner, Jacy Chant, DPM, 20 mg at 02/09/20 1637    tamsulosin (FLOMAX) capsule 0 4 mg, 0 4 mg, Oral, Daily With Alejandro Osorio, DPM, 0 4 mg at 02/09/20 1637    Invasive Devices:      Lab Results:   Results from last 7 days   Lab Units 02/10/20  0502 02/09/20  0528 02/08/20  1439 02/08/20  0523 02/07/20  1715   WBC Thousand/uL 8 60 9 88  --  9 29 12 38*   HEMOGLOBIN g/dL 7 9* 7 8*  --  7 5* 8 3*   HEMATOCRIT % 25 6* 25 2*  --  24 6* 26 6*   PLATELETS Thousands/uL 284 297  --  292 325   POTASSIUM mmol/L 4 9 5 1  --  5 0 5 3   CHLORIDE mmol/L 105 105  --  106 102   CO2 mmol/L 22 22  --  22 23   BUN mg/dL 49* 57*  --  66* 72*   CREATININE mg/dL 1 86* 1 93*  --  2 02* 2 05*   CALCIUM mg/dL 8 3 8 7  --  8 2* 8 6   ALK PHOS U/L  --   --   --   --  88   ALT U/L  --   --   --   --  8*   AST U/L  --   --   --   --  14   LEUKOCYTES UA   --   --  Negative  --   --    BLOOD UA   --   --  Moderate*  --   --        Previous work up:  Please see previous notes

## 2020-02-11 ENCOUNTER — APPOINTMENT (INPATIENT)
Dept: NON INVASIVE DIAGNOSTICS | Facility: HOSPITAL | Age: 65
DRG: 907 | End: 2020-02-11
Payer: MEDICARE

## 2020-02-11 PROBLEM — M79.89 LEFT ARM SWELLING: Status: ACTIVE | Noted: 2020-02-11

## 2020-02-11 LAB
ANION GAP SERPL CALCULATED.3IONS-SCNC: 12 MMOL/L (ref 4–13)
BASOPHILS # BLD AUTO: 0.09 THOUSANDS/ΜL (ref 0–0.1)
BASOPHILS NFR BLD AUTO: 1 % (ref 0–1)
BUN SERPL-MCNC: 41 MG/DL (ref 5–25)
CALCIUM SERPL-MCNC: 8.1 MG/DL (ref 8.3–10.1)
CHLORIDE SERPL-SCNC: 106 MMOL/L (ref 100–108)
CO2 SERPL-SCNC: 19 MMOL/L (ref 21–32)
CREAT SERPL-MCNC: 1.63 MG/DL (ref 0.6–1.3)
EOSINOPHIL # BLD AUTO: 1.25 THOUSAND/ΜL (ref 0–0.61)
EOSINOPHIL NFR BLD AUTO: 11 % (ref 0–6)
ERYTHROCYTE [DISTWIDTH] IN BLOOD BY AUTOMATED COUNT: 14.6 % (ref 11.6–15.1)
GFR SERPL CREATININE-BSD FRML MDRD: 43 ML/MIN/1.73SQ M
GLUCOSE SERPL-MCNC: 157 MG/DL (ref 65–140)
GLUCOSE SERPL-MCNC: 158 MG/DL (ref 65–140)
GLUCOSE SERPL-MCNC: 159 MG/DL (ref 65–140)
GLUCOSE SERPL-MCNC: 181 MG/DL (ref 65–140)
GLUCOSE SERPL-MCNC: 90 MG/DL (ref 65–140)
HCT VFR BLD AUTO: 27.5 % (ref 36.5–49.3)
HGB BLD-MCNC: 8.5 G/DL (ref 12–17)
IMM GRANULOCYTES # BLD AUTO: 0.05 THOUSAND/UL (ref 0–0.2)
IMM GRANULOCYTES NFR BLD AUTO: 1 % (ref 0–2)
LYMPHOCYTES # BLD AUTO: 0.87 THOUSANDS/ΜL (ref 0.6–4.47)
LYMPHOCYTES NFR BLD AUTO: 8 % (ref 14–44)
MCH RBC QN AUTO: 28.7 PG (ref 26.8–34.3)
MCHC RBC AUTO-ENTMCNC: 30.9 G/DL (ref 31.4–37.4)
MCV RBC AUTO: 93 FL (ref 82–98)
MONOCYTES # BLD AUTO: 0.85 THOUSAND/ΜL (ref 0.17–1.22)
MONOCYTES NFR BLD AUTO: 8 % (ref 4–12)
NEUTROPHILS # BLD AUTO: 7.99 THOUSANDS/ΜL (ref 1.85–7.62)
NEUTS SEG NFR BLD AUTO: 71 % (ref 43–75)
NRBC BLD AUTO-RTO: 0 /100 WBCS
PLATELET # BLD AUTO: 299 THOUSANDS/UL (ref 149–390)
PMV BLD AUTO: 9.6 FL (ref 8.9–12.7)
POTASSIUM SERPL-SCNC: 4.7 MMOL/L (ref 3.5–5.3)
RBC # BLD AUTO: 2.96 MILLION/UL (ref 3.88–5.62)
SODIUM SERPL-SCNC: 137 MMOL/L (ref 136–145)
WBC # BLD AUTO: 11.1 THOUSAND/UL (ref 4.31–10.16)

## 2020-02-11 PROCEDURE — 99232 SBSQ HOSP IP/OBS MODERATE 35: CPT | Performed by: INTERNAL MEDICINE

## 2020-02-11 PROCEDURE — 97163 PT EVAL HIGH COMPLEX 45 MIN: CPT | Performed by: PHYSICAL THERAPIST

## 2020-02-11 PROCEDURE — 85025 COMPLETE CBC W/AUTO DIFF WBC: CPT | Performed by: PHYSICIAN ASSISTANT

## 2020-02-11 PROCEDURE — 97166 OT EVAL MOD COMPLEX 45 MIN: CPT

## 2020-02-11 PROCEDURE — 94660 CPAP INITIATION&MGMT: CPT

## 2020-02-11 PROCEDURE — 93971 EXTREMITY STUDY: CPT | Performed by: SURGERY

## 2020-02-11 PROCEDURE — 99232 SBSQ HOSP IP/OBS MODERATE 35: CPT | Performed by: PHYSICIAN ASSISTANT

## 2020-02-11 PROCEDURE — 93971 EXTREMITY STUDY: CPT

## 2020-02-11 PROCEDURE — 82948 REAGENT STRIP/BLOOD GLUCOSE: CPT

## 2020-02-11 PROCEDURE — 80048 BASIC METABOLIC PNL TOTAL CA: CPT | Performed by: PHYSICIAN ASSISTANT

## 2020-02-11 RX ORDER — DOXYCYCLINE HYCLATE 100 MG/1
100 CAPSULE ORAL EVERY 12 HOURS SCHEDULED
Status: DISCONTINUED | OUTPATIENT
Start: 2020-02-11 | End: 2020-02-13 | Stop reason: HOSPADM

## 2020-02-11 RX ORDER — FUROSEMIDE 40 MG/1
40 TABLET ORAL
Status: DISCONTINUED | OUTPATIENT
Start: 2020-02-11 | End: 2020-02-13 | Stop reason: HOSPADM

## 2020-02-11 RX ADMIN — DOXYCYCLINE 100 MG: 100 CAPSULE ORAL at 12:37

## 2020-02-11 RX ADMIN — INSULIN LISPRO 1 UNITS: 100 INJECTION, SOLUTION INTRAVENOUS; SUBCUTANEOUS at 17:27

## 2020-02-11 RX ADMIN — METOPROLOL TARTRATE 50 MG: 50 TABLET, FILM COATED ORAL at 21:08

## 2020-02-11 RX ADMIN — TAMSULOSIN HYDROCHLORIDE 0.4 MG: 0.4 CAPSULE ORAL at 17:26

## 2020-02-11 RX ADMIN — METOPROLOL TARTRATE 50 MG: 50 TABLET, FILM COATED ORAL at 08:09

## 2020-02-11 RX ADMIN — DOXYCYCLINE 100 MG: 100 CAPSULE ORAL at 21:07

## 2020-02-11 RX ADMIN — FERROUS GLUCONATE 324 MG: 324 TABLET ORAL at 06:10

## 2020-02-11 RX ADMIN — INSULIN LISPRO 1 UNITS: 100 INJECTION, SOLUTION INTRAVENOUS; SUBCUTANEOUS at 21:07

## 2020-02-11 RX ADMIN — Medication 1 TABLET: at 08:09

## 2020-02-11 RX ADMIN — HEPARIN SODIUM 5000 UNITS: 5000 INJECTION INTRAVENOUS; SUBCUTANEOUS at 06:10

## 2020-02-11 RX ADMIN — CEFEPIME HYDROCHLORIDE 1000 MG: 1 INJECTION, POWDER, FOR SOLUTION INTRAMUSCULAR; INTRAVENOUS at 06:10

## 2020-02-11 RX ADMIN — AMLODIPINE BESYLATE 10 MG: 10 TABLET ORAL at 08:09

## 2020-02-11 RX ADMIN — INSULIN LISPRO 1 UNITS: 100 INJECTION, SOLUTION INTRAVENOUS; SUBCUTANEOUS at 12:37

## 2020-02-11 RX ADMIN — Medication 1000 MG: at 08:09

## 2020-02-11 RX ADMIN — FUROSEMIDE 40 MG: 40 TABLET ORAL at 17:26

## 2020-02-11 RX ADMIN — FUROSEMIDE 40 MG: 40 TABLET ORAL at 08:09

## 2020-02-11 RX ADMIN — ALLOPURINOL 300 MG: 100 TABLET ORAL at 08:09

## 2020-02-11 RX ADMIN — Medication 1000 MG: at 17:26

## 2020-02-11 RX ADMIN — FERROUS GLUCONATE 324 MG: 324 TABLET ORAL at 17:26

## 2020-02-11 RX ADMIN — PRAVASTATIN SODIUM 20 MG: 20 TABLET ORAL at 17:26

## 2020-02-11 NOTE — PHYSICAL THERAPY NOTE
Physical Therapy Evaluation      Patient Active Problem List   Diagnosis    Bilateral leg edema    [facility]    Diabetic foot ulcer ([place] [address] )    DM type 2, not at goal [facility])    Easy bruising    Eczema    Erectile dysfunction of non-organic origin    Gout    Hyperlipidemia    Uremia    Osteoarthritis    Peripheral arterial disease (HCC)    PVCs (premature ventricular contractions)    Rhinitis    Systolic murmur    Vertigo    Complete heart block (HCC)    Metabolic acidosis    Other hyperlipidemia    Severe sepsis (HCC)    PAF (paroxysmal atrial fibrillation) (HCC)    Acute respiratory failure with hypoxia (HCC)    Persistent proteinuria    Volume overload    Urinary retention    NICOLASA (obstructive sleep apnea)    Acute diastolic (congestive) heart failure (HCC)    Type 2 diabetes mellitus with chronic kidney disease, with long-term current use of insulin (HCC)    Essential hypertension    Stage 3 chronic kidney disease ([facility])    Nonrheumatic aortic valve stenosis    Hypoglycemia    Anemia    Fever    Mitral valve stenosis    Abnormal CT of the chest    Acute pulmonary edema (HCC)    Chronic diastolic (congestive) heart failure (HCC)    Left renal artery stenosis (HCC)    S/P amputation of lesser toe, left ([facility])    S/P amputation of lesser toe, right (HCC)    Elevated troponin I level    Staphylococcus aureus bacteremia    Type 2 diabetes mellitus with diabetic neuropathy, without long-term current use of insulin ([facility])    Iron deficiency anemia    Anxiety    Osteomyelitis of foot, left, acute ([facility])       Past Medical History:   Diagnosis Date    Arthritis     OA    Bruise of both arms     forearms and both hands    Bruises easily     CH[facility]congestive heart failure) [facility] )     Diabetes mellitus [facility]     Diabetic foot ulcer ([facility] )     Eczema     Erectile dysfunction     Gout     Hyperlipidemia     Hypertension     Murmur     Nephropathy  Osteoarthritis     PAD (peripheral artery disease) (HCC)     Seasonal allergies     Toe infection     bilat great toes    Vertigo     Walks frequently     Wears dentures     upper    Wears glasses        Past Surgical History:   Procedure Laterality Date    CARDIAC PACEMAKER PLACEMENT      CARPAL TUNNEL RELEASE Left     CARPAL TUNNEL RELEASE Right     CARPAL TUNNEL RELEASE      FOOT AMPUTATION Left 2/10/2020    Procedure: PARTIAL 1ST RAY AMPUTATION;  Surgeon: Jorge Rosario DPM;  [facility];  Service: Podiatry    INCISION AND DRAINAGE OF WOUND Left 1/12/2020    Procedure: INCISION AND DRAINAGE (I&D) EXTREMITY AND REMOVAL OF SESMOID BONE;  Surgeon: Tennille Elder DPM;  [facility];  Service: Podiatry    IR PICC REPO  1/14/2020    KNEE ARTHROSCOPY Left     KNEE ARTHROSCOPY Right     KNEE SURGERY      KY AMPUTATION TOE,I-P JT Bilateral 5/2/2017    Procedure: PARTIAL AMPUTATION RIGHT AND LEFT HALLUX ;  Surgeon: Jorge Rosario DPM;  [facility];  Service: Podiatry    KY AMPUTATION TOE,MT-P JT Left 7/25/2017    Procedure: 2ND TOE AMPUTATION;  Surgeon: Jorge Rosario DPM;  [facility];  Service: Podiatry    SHOULDER ARTHROSCOPY Left     with screws,RTC    SHOULDER SURGERY      TOE AMPUTATION Left 1/8/2020    Procedure: HALLUX AMPUTATION;  Surgeon: Tennille Elder DPM;  [facility];  Service: Podiatry    [address]          02/11/20 7695   Note Type   Note type Eval only   Pain Assessment   Pain Assessment No/denies pain   Home Living   Type of [address] One level; Two level;Performs ADLs on one level;Ramped entrance   [address] Walker;Cane;Wheelchair-manual  (rollabout)   Prior Function   Level of [place] Independent with ADLs and functional mobility   Lives With Spouse;Son  (dil)   Receives Help From Family   ADL Assistance Independent   Falls in the last 6 months 0   Comments pt is indep wihtout assistive device  pt does nothave stairs and stays on first floor  Restrictions/Precautions   Weight Bearing Precautions Per Order Yes   LLE Weight Bearing Per Order NWB  (may heel touch for bathroom priveledges and transfers  )   Braces or Orthoses   (darcowedge shoe for lle)   Other Precautions Multiple lines; Fall Risk   General   Family/Caregiver Present No   Cognition   Overall Cognitive Status WFL   Orientation Level Oriented X4   RUE Assessment   RUE Assessment WFL   LUE Assessment   LUE Assessment WFL   RLE Assessment   RLE Assessment X  (str 4+/5)   LLE Assessment   LLE Assessment X  (str 4+/5)   Coordination   Movements are Fluid and Coordinated 1   Sensation WFL   Bed Mobility   Rolling R 7  Independent   Rolling L 7  Independent   Supine to Sit 7  Independent   Sit to Supine 7  Independent   Transfers   Sit to Stand 6  Modified independent   Stand to Sit 6  Modified independent   Stand pivot 6  Modified independent   Ambulation/Elevation   Gait pattern WNL   Gait Assistance 6  Modified independent   Assistive Device Rolling walker   Distance 10' to bathroom   Balance   Static Sitting Normal   Dynamic Sitting Normal   Static Standing Fair +   Dynamic Standing Fair -   Ambulatory Fair -   Endurance Deficit   Endurance Deficit No   Activity Tolerance   Activity Tolerance Patient tolerated treatment well   Nurse Made Aware yes- sumeet   Assessment   Assessment pt admitted with OM L foot and underwent 1st ray resection, pt now referred to PT  pt is indep PTA, no assistive device but has equipement from prior foot surgeries, including rollabout and w/c  pt demonstrated modified indep mobility using rw nwb and heel touch WB for bathroom priveleges  pt demonstrated good safety awareness and practices  needed initial instructions for safety but able to demonstrate carry through  pt has current deficits in skin and joint inetegrity, balance,gait stability and sequencing  pt using darcowedge shoe placed by podiatry   pt can return home, no home PT needs  spoke to nursing regarding pt status  no further skilled PT needs   d/c PT   Recommendation   Recommendation Home with family support  (uses rw for touchdown heel weight bearing for bathroom)   Equipment Recommended Walker  (has dme)   PT - OK to Discharge Yes   Modified Stefania Scale   Modified [place] Scale 2   Barthel Index   Feeding 10   Bathing 5   Grooming Score 5   Dressing Score 10   Bladder Score 10   Bowels Score 10   Toilet Use Score 10   Transfers (Bed/Chair) Score 10   Mobility (Level Surface) Score 0   Stairs Score 0   Barthel Index Score 70   History: co - morbidities, fall risk, use of assistive device, assist for adl's,  multiple lines  Exam: impairments in locomotion, musculoskeletal, balance, joint integrity, skin integrity, Clinical: unstable/unpredictable  Complexity:high    Dulce Maria Lopes, PT

## 2020-02-11 NOTE — ASSESSMENT & PLAN NOTE
· Baseline variable, most recent 1 9-2 since last admission  · Nephrology on board - continue with recommendations   · Monitor renal function     Lab Results   Component Value Date    CREATININE 1 63 (H) 02/11/2020    CREATININE 1 86 (H) 02/10/2020    CREATININE 1 93 (H) 02/09/2020

## 2020-02-11 NOTE — PROGRESS NOTES
NEPHROLOGY PROGRESS NOTE   Jose Curiel 75 y o  male MRN: [id number]  Unit/Bed#: [place] 2 -01 Encounter: [id number]      ASSESSMENT & PLAN    Essential (primary) hypertension  - blood pressures at home are averaging in the 855-972 systolic range  - given his age and comorbidities his goal blood pressure should be 125 to 135 over 70-80  - he is currently on amlodipine 10 mg daily, metoprolol 50 mg twice daily, losartan was decreased to 25 mg daily and now on hold and chlorthalidone was discontinued in the past  -blood pressures are in the 317-398 systolic range  -some signs of mild volume as well in the lower extremity  -continue furosemide 40 mg p o  B i d , metoprolol 50 mg every 12 hours, re-initiated amlodipine     Persistent proteinuria  - in the setting of prolonged diabetes  -he does have nephrotic range proteinuria serologic workup was negative in the past     Nephropathy  -serologies negative will monitor at this point     Gout  - on allopurinol 300 mg daily  - has been gout free, will monitor and continue     Bilateral leg edema  - left greater than right,  - continues on furosemide 40 mg 2 times daily held evening dose yesterday     Left renal artery stenosis  -renal artery Doppler showed 60% stenosis  -can be indication and cause of flash pulmonary edema  -mitral valve disease also present     Left foot osteomyelitis, peripheral vascular disease  -for surgery today       SUBJECTIVE:    Patient was seen today overall stable creatinine improving good urine output denies any chest pain    OBJECTIVE:  Current Weight: Weight - Scale: 98 3 kg (216 lb 11 4 oz)  Vitals:    02/11/20 0747   BP: 160/64   Pulse: 74   Resp: 18   Temp: 98 °F (36 7 °C)   SpO2: 94%       Intake/Output Summary (Last 24 hours) at 2/11/2020 1305  Last data filed at 2/10/2020 1835  Gross per 24 hour   Intake 150 ml   Output 10 ml   Net 140 ml     Weight (last 2 days)     Date/Time   Weight    02/11/20 0600   98 3 (216 71)    02/10/20 0600   96 2 (212 08)    02/09/20 0600   96 5 (212 74)              General: conscious, cooperative, in not acute distress  Eyes: conjunctivae pink, anicteric sclerae  ENT: lips and mucous membranes moist  Neck: supple, no JVD  Chest: clear breath sounds bilateral, no crackles, ronchus or wheezings  CVS: distinct S1 & S2, normal rate, regular rhythm  Abdomen: soft, non-tender, non-distended, normoactive bowel sounds  Extremities:  Trace edema, left foot with dressing in place  Skin: no rash  Neuro: awake, alert, oriented      Medications:    Current Facility-Administered Medications:     acetaminophen (TYLENOL) tablet 650 mg, 650 mg, Oral, Q6H PRN, Esther Rajas, DPM    allopurinol (ZYLOPRIM) tablet 300 mg, 300 mg, Oral, QAM, Azalea Hinds, DPM, 300 mg at 02/11/20 0809    amLODIPine (NORVASC) tablet 10 mg, 10 mg, Oral, Daily, Esther Rajas, DPM, 10 mg at 02/11/20 0809    doxycycline hyclate (VIBRAMYCIN) capsule 100 mg, 100 mg, Oral, Q12H [facility], Disha Mukherjee MD, 100 mg at 02/11/20 1237    ferrous gluconate (FERGON) tablet 324 mg, 324 mg, Oral, BID AC, Azalea Hinds, DPM, 324 mg at 02/11/20 0610    fish oil capsule 1,000 mg, 1,000 mg, Oral, BID, Esther Rajas, DPM, 1,000 mg at 02/11/20 0809    furosemide (LASIX) tablet 40 mg, 40 mg, Oral, Daily, Azalea Hinds, DPM, 40 mg at 02/11/20 0809    insulin lispro (HumaLOG) 100 units/mL subcutaneous injection 1-6 Units, 1-6 Units, Subcutaneous, TID AC, 1 Units at 02/11/20 1237 **AND** Fingerstick Glucose (POCT), , , TID AC, Azalea Hinds, DPM    insulin lispro (HumaLOG) 100 units/mL subcutaneous injection 1-6 Units, 1-6 Units, Subcutaneous, HS, Azalea Hinds, DPM    LORazepam (ATIVAN) tablet 0 5 mg, 0 5 mg, Oral, Q8H PRN, Esther Norman DPM, 0 5 mg at 02/09/20 2121    metoprolol tartrate (LOPRESSOR) tablet 50 mg, 50 mg, Oral, Q12H [facility] Hinds, EDWINM, 50 mg at 02/11/20 0809    multivitamin-minerals (CENTRUM) tablet 1 tablet, 1 tablet, Oral, Daily, Esther Norman DPM, 1 tablet at 02/11/20 0809    oxyCODONE (ROXICODONE) IR tablet 5 mg, 5 mg, Oral, Q6H PRN, Betty Gibbs, DPM, 5 mg at 02/07/20 1824    pravastatin (PRAVACHOL) tablet 20 mg, 20 mg, Oral, Daily With Dinner, Betty Gibbs, DPM, 20 mg at 02/10/20 2028    tamsulosin [facility]) capsule 0 4 mg, 0 4 mg, Oral, Daily With Diana Arevalo DPM, 0 4 mg at 02/10/20 2029    Invasive Devices:      Lab Results:   Results from last 7 days   Lab Units 02/11/20  0945 02/10/20  0502 02/09/20  0528 02/08/20  1439 02/08/20  0523 02/07/20  1715   WBC Thousand/uL 11 10* 8 60 9 88  --  9 29 12 38*   HEMOGLOBIN g/dL 8 5* 7 9* 7 8*  --  7 5* 8 3*   HEMATOCRIT % 27 5* 25 6* 25 2*  --  24 6* 26 6*   PLATELETS Thousands/uL 299 284 297  --  292 325   POTASSIUM mmol/L 4 7 4 9 5 1  --  5 0 5 3   CHLORIDE mmol/L 106 105 105  --  106 102   CO2 mmol/L 19* 22 22  --  22 23   BUN mg/dL 41* 49* 57*  --  66* 72*   CREATININE mg/dL 1 63* 1 86* 1 93*  --  2 02* 2 05*   CALCIUM mg/dL 8 1* 8 3 8 7  --  8 2* 8 6   ALK PHOS U/L  --   --   --   --   --  88   ALT U/L  --   --   --   --   --  8*   AST U/L  --   --   --   --   --  14   LEUKOCYTES UA   --   --   --  Negative  --   --    BLOOD UA   --   --   --  Moderate*  --   --        Previous work up:  Please see previous notes

## 2020-02-11 NOTE — ASSESSMENT & PLAN NOTE
· Podiatry primary team  · With recent hospitalization, S/P left hallux amputation (DOS: 1/8/20) and S/P left foot I&D with removal of sesamoids (DOS: 1/12/20) and now with wound dehiscence  · s/p left partial 1st ray amputation 2/10, POD #1   · Had MSSA bacteremia, treated until yesterday with IV Ancef  PICC in place right arm  Repeat blood cultures to be performed after 2/20 to ensure eradication    · ID consulted, transitioned to po doxycyline to complete 7 days course post operatively

## 2020-02-11 NOTE — PLAN OF CARE
Problem: Potential for Falls  Goal: Patient will remain free of falls  Description  INTERVENTIONS:  - Assess patient frequently for physical needs  -  Identify cognitive and physical deficits and behaviors that affect risk of falls    -  [place] fall precautions as indicated by assessment   - Educate patient/family on patient safety including physical limitations  - Instruct patient to call for assistance with activity based on assessment  - Modify environment to reduce risk of injury  - Consider OT/PT consult to assist with strengthening/mobility  Outcome: Progressing     Problem: INFECTION - ADULT  Goal: Absence or prevention of progression during hospitalization  Description  INTERVENTIONS:  - Assess and monitor for signs and symptoms of infection  - Monitor lab/diagnostic results  - Monitor all insertion sites, i e  indwelling lines, tubes, and drains  - Monitor endotracheal if appropriate and nasal secretions for changes in amount and color  - [place] appropriate cooling/warming therapies per order  - Administer medications as ordered  - Instruct and encourage patient and family to use good hand hygiene technique  - Identify and instruct in appropriate isolation precautions for identified infection/condition  Outcome: Progressing     Problem: SAFETY ADULT  Goal: Maintain or return to baseline ADL function  Description  INTERVENTIONS:  -  Assess patient's ability to carry out ADLs; assess patient's baseline for ADL function and identify physical deficits which impact ability to perform ADLs (bathing, care of mouth/teeth, toileting, grooming, dressing, etc )  - Assess/evaluate cause of self-care deficits   - Assess range of motion  - Assess patient's mobility; develop plan if impaired  - Assess patient's need for assistive devices and provide as appropriate  - Encourage maximum independence but intervene and supervise when necessary  - Involve family in performance of ADLs  - Assess for home care needs following discharge   - Consider OT consult to assist with ADL evaluation and planning for discharge  - Provide patient education as appropriate  Outcome: Progressing  Goal: Maintain or return mobility status to optimal level  Description  INTERVENTIONS:  - Assess patient's baseline mobility status (ambulation, transfers, stairs, etc )    - Identify cognitive and physical deficits and behaviors that affect mobility  - Identify mobility aids required to assist with transfers and/or ambulation (gait belt, sit-to-stand, lift, walker, cane, etc )  - [place] fall precautions as indicated by assessment  - Record patient progress and toleration of activity level on Mobility SBAR; progress patient to next Phase/Stage  - Instruct patient to call for assistance with activity based on assessment  - Consider rehabilitation consult to assist with strengthening/weightbearing, etc   Outcome: Progressing     Problem: DISCHARGE PLANNING  Goal: Discharge to home or other facility with appropriate resources  Description  INTERVENTIONS:  - Identify barriers to discharge w/patient and caregiver  - Arrange for needed discharge resources and transportation as appropriate  - Identify discharge learning needs (meds, wound care, etc )  - Arrange for interpretive services to assist at discharge as needed  - Refer to Case Management Department for coordinating discharge planning if the patient needs post-hospital services based on physician/advanced practitioner order or complex needs related to functional status, cognitive ability, or social support system  Outcome: Progressing     Problem: SKIN/TISSUE INTEGRITY - ADULT  Goal: Skin integrity remains intact  Description  INTERVENTIONS  - Identify patients at risk for skin breakdown  - Assess and monitor skin integrity  - Assess and monitor nutrition and hydration status  - Monitor labs (i e  albumin)  - Assess for incontinence   - Turn and reposition patient  - Assist with mobility/ambulation  - Relieve pressure over bony prominences  - Avoid friction and shearing  - Provide appropriate hygiene as needed including keeping skin clean and dry  - Evaluate need for skin moisturizer/barrier cream  - Collaborate with interdisciplinary team (i e  Nutrition, Rehabilitation, etc )   - Patient/family teaching  Outcome: Progressing  Goal: Incision(s), wounds(s) or drain site(s) healing without S/S of infection  Description  INTERVENTIONS  - Assess and document risk factors for skin impairment   - Assess and document dressing, incision, wound bed, drain sites and surrounding tissue  - Consider nutrition services referral as needed  - Oral mucous membranes remain intact  - Provide patient/ family education  Outcome: Progressing  Goal: Oral mucous membranes remain intact  Description  INTERVENTIONS  - Assess oral mucosa and hygiene practices  - Implement preventative oral hygiene regimen  - Implement oral medicated treatments as ordered  - Initiate Nutrition services referral as needed  Outcome: Progressing     Problem: Prexisting or High Potential for Compromised Skin Integrity  Goal: Skin integrity is maintained or improved  Description  INTERVENTIONS:  - Identify patients at risk for skin breakdown  - Assess and monitor skin integrity  - Assess and monitor nutrition and hydration status  - Monitor labs   - Assess for incontinence   - Turn and reposition patient  - Assist with mobility/ambulation  - Relieve pressure over bony prominences  - Avoid friction and shearing  - Provide appropriate hygiene as needed including keeping skin clean and dry  - Evaluate need for skin moisturizer/barrier cream  - Collaborate with interdisciplinary team   - Patient/family teaching  - Consider wound care consult   Outcome: Progressing

## 2020-02-11 NOTE — ASSESSMENT & PLAN NOTE
Lab Results   Component Value Date    HGBA1C 6 6 02/03/2020       Recent Labs     02/10/20  1848 02/10/20  2107 02/11/20  0753 02/11/20  1146   POCGLU 103 97 90 158*       Blood Sugar Average: Last 72 hrs:  (P) 122 4   · Well controlled per last A1c   · Maintained on Xultophy (combination drug with basal insulin) 19 units daily, Metformin 500 mg BID  · Hold oral agent while here  · Monitor FSBS ac / hs and cover with SSI  No basal at this time, euglycemic    Adjust insulin doses as needed for tight glucose control for wound healing

## 2020-02-11 NOTE — PROGRESS NOTES
Progress Note - Shira Sanders 1955, 75 y o  male MRN: [id number]  Unit/Bed#: [address] [address] 213-01 Encounter: [id number]  Primary Care Provider: Nadine Mckeon DO   Date and time admitted to hospital: 2/7/2020  4:09 PM    * Diabetes mellitus with neuropathy [facility])  Assessment & Plan  Lab Results   Component Value Date    HGBA1C 6 6 02/03/2020       Recent Labs     02/10/20  1848 02/10/20  2107 02/11/20  0753 02/11/20  1146   POCGLU 103 97 90 158*       Blood Sugar Average: Last 72 hrs:  (P) 122 4   · Well controlled per last A1c   · Maintained on Xultophy (combination drug with basal insulin) 19 units daily, Metformin 500 mg BID  · Hold oral agent while here  · Monitor FSBS ac / hs and cover with SSI  No basal at this time, euglycemic  Adjust insulin doses as needed for tight glucose control for wound healing       Osteomyelitis of foot, left, acute (HCC)  Assessment & Plan  · Podiatry primary team  · With recent hospitalization, S/P left hallux amputation (DOS: 1/8/20) and S/P left foot I&D with removal of sesamoids (DOS: 1/12/20) and now with wound dehiscence  · s/p left partial 1st ray amputation 2/10, POD #1   · Had MSSA bacteremia, treated until yesterday with IV Ancef  PICC in place right arm  Repeat blood cultures to be performed after 2/20 to ensure eradication    · ID consulted, transitioned to po doxycyline to complete 7 days course post operatively     Essential hypertension  Assessment & Plan  · Maintained on amlodipine, metoprolol, and losartan  · Hold losartan for now with recent ELZBIETA  · Nephrology following   · Restarted amlodipine today   · Monitor VS    Acute diastolic (congestive) heart failure (HCC)  Assessment & Plan  Wt Readings from Last 3 Encounters:   02/11/20 98 3 kg (216 lb 11 4 oz)   02/07/20 95 8 kg (211 lb 3 2 oz)   01/29/20 94 5 kg (208 lb 5 4 oz)     · Maintained on Lasix 40 mg BID and Zaroxolyn as needed  · Echo 1/10/20 EF 55%, moderate MS, mild MR, moderate AR, mild AS, mild to moderate TR, moderate pulmonary hypertension   · Nephrology onboard, recommendations and diuretic management appreciated   · on lasix 40mg bid - continue   · Monitor volume status closely isaias operatively   · Seen by Cardiology pre operatively   · No acute complaints currently   · Daily weight / I&O          Anxiety  Assessment & Plan  · Maintained on Ativan as needed      Complete heart block (HCC)  Assessment & Plan  · S/p pacemaker implantation  · Cardiology seen pre operatively     Hyperlipidemia  Assessment & Plan  · Maintained on simvastatin and Lovaza  · Continue    Iron deficiency anemia  Assessment & Plan  · On iron supplementation  · Monitor H&H isaias operatively and transfuse as needed   · hgb stable today no active bleeding     Lab Results   Component Value Date    HGB 8 5 (L) 02/11/2020    HGB 7 9 (L) 02/10/2020    HGB 7 8 (L) 02/09/2020         NICOLASA (obstructive sleep apnea)  Assessment & Plan  · Maintained on home CPAP therapy  · Continue    PAF (paroxysmal atrial fibrillation) (HCC)  Assessment & Plan  · Maintained on Metoprolol 50 mg BID and Eliquis  · Cardiology consulted  · Resume Eliquis post operatively as soon as acceptable from podiatry standpoint     Peripheral arterial disease ([facility] [address] )  Assessment & Plan  · Recent LEAD's on 1/13 with diffuse disease without focal stenosis  · Maintained on Lovaza and simvastatin    Stage 3 chronic kidney disease ([facility] [address] )  Assessment & Plan  · Baseline variable, most recent 1 9-2 since last admission  · Nephrology on board - continue with recommendations   · Monitor renal function     Lab Results   Component Value Date    CREATININE 1 63 (H) 02/11/2020    CREATININE 1 86 (H) 02/10/2020    CREATININE 1 93 (H) 02/09/2020         Left arm swelling  Assessment & Plan  Left arm swelling noted, new, no pain, erythema or rash   No signs of drug reaction, only new medication is doxycyline  No IV or PICC present in that arm   Check venous duplex          VTE Pharmacologic Prophylaxis:   Mechanical VTE Prophylaxis in Place: Yes    Patient Centered Rounds: I have performed bedside rounds with nursing staff today  Discussions with Specialists or Other Care Team Provider:     Education and Discussions with Family / Patient: patient, wife at bedside     Time Spent for Care: 20 minutes  More than 50% of total time spent on counseling and coordination of care as described above  Current Length of Stay: 4 day(s)    Current Patient Status: Inpatient   Certification Statement: The patient will continue to require additional inpatient hospital stay due to s/p surgery per podaitry     Discharge Plan: per podiatry     Code Status: Level 1 - Full Code      Subjective:   Patient doing well  No acute complaints  Re-evaluated patient per nurse request due to left arm swelling  No pain, no erythema, no warmth, no other acute complaints, no rash  Wife at bedside is requesting imaging  No trauma  Objective:     Vitals:   Temp (24hrs), Av 1 °F (36 7 °C), Min:98 °F (36 7 °C), Max:98 2 °F (36 8 °C)    Temp:  [98 °F (36 7 °C)-98 2 °F (36 8 °C)] 98 1 °F (36 7 °C)  HR:  [58-74] 63  Resp:  [15-19] 18  BP: (145-160)/(61-86) 151/61  SpO2:  [94 %-100 %] 98 %  Body mass index is 31 09 kg/m²  Input and Output Summary (last 24 hours): Intake/Output Summary (Last 24 hours) at 2020 1453  Last data filed at 2/10/2020 1835  Gross per 24 hour   Intake 150 ml   Output 10 ml   Net 140 ml       Physical Exam:     Physical Exam   Constitutional: No distress  HENT:   Head: Normocephalic  Eyes: Conjunctivae are normal    Neck: Neck supple  Cardiovascular: Normal rate and regular rhythm  Murmur heard  Pulmonary/Chest: Effort normal and breath sounds normal    Abdominal: Soft  Bowel sounds are normal    Musculoskeletal: He exhibits deformity  Swelling of LUE, no erythema rash present    Neurological: He is alert  Skin: Skin is warm  No rash noted  No erythema     Psychiatric: He has a normal mood and affect  Nursing note and vitals reviewed  Additional Data:     Labs:    Results from last 7 days   Lab Units 02/11/20  0945   WBC Thousand/uL 11 10*   HEMOGLOBIN g/dL 8 5*   HEMATOCRIT % 27 5*   PLATELETS Thousands/uL 299   NEUTROS PCT % 71   LYMPHS PCT % 8*   MONOS PCT % 8   EOS PCT % 11*     Results from last 7 days   Lab Units 02/11/20  0945  02/07/20  1715   SODIUM mmol/L 137   < > 134*   POTASSIUM mmol/L 4 7   < > 5 3   CHLORIDE mmol/L 106   < > 102   CO2 mmol/L 19*   < > 23   BUN mg/dL 41*   < > 72*   CREATININE mg/dL 1 63*   < > 2 05*   ANION GAP mmol/L 12   < > 9   CALCIUM mg/dL 8 1*   < > 8 6   ALBUMIN g/dL  --   --  2 3*   TOTAL BILIRUBIN mg/dL  --   --  0 33   ALK PHOS U/L  --   --  88   ALT U/L  --   --  8*   AST U/L  --   --  14   GLUCOSE RANDOM mg/dL 159*   < > 101    < > = values in this interval not displayed  Results from last 7 days   Lab Units 02/11/20  1146 02/11/20  0753 02/10/20  2107 02/10/20  1848 02/10/20  1706 02/10/20  1139 02/10/20  0722 02/09/20  2102 02/09/20  1621 02/09/20  1127 02/09/20  0756 02/08/20  2121   POC GLUCOSE mg/dl 158* 90 97 103 110 115 122 139 173* 145* 98 131                   * I Have Reviewed All Lab Data Listed Above  * Additional Pertinent Lab Tests Reviewed:  All Labs Within Last 24 Hours Reviewed    Imaging:    Imaging Reports Reviewed Today Include:   Imaging Personally Reviewed by Myself Includes:  none    Recent Cultures (last 7 days):     Results from last 7 days   Lab Units 02/10/20  1812 02/10/20  1804   GRAM STAIN RESULT  No Polys or Bacteria seen No Polys or Bacteria seen   WOUND CULTURE   --  No growth       Last 24 Hours Medication List:     Current Facility-Administered Medications:  acetaminophen 650 mg Oral Q6H PRN Deidra Erp, DPM   allopurinol 300 mg Oral QAM Azalea Hinds, DPM   amLODIPine 10 mg Oral Daily Deidra Erp, DPM   doxycycline hyclate 100 mg Oral Q12H [address] Gail Harris MD   ferrous gluconate 324 mg Oral BID AC Lita Alcantara, DPM   fish oil 1,000 mg Oral BID Lita Alcantara, DPM   furosemide 40 mg Oral BID (diuretic) Leonard Kumar,    insulin lispro 1-6 Units Subcutaneous TID AC Azalea Hinds, DPM   insulin lispro 1-6 Units Subcutaneous HS Azalea Hinds, DPM   LORazepam 0 5 mg Oral Q8H PRN Lita Alcantara, DPM   metoprolol tartrate 50 mg Oral Q12H [address] Lita Alcantara, DPM   multivitamin-minerals 1 tablet Oral Daily Lita Alcantara DPM   oxyCODONE 5 mg Oral Q6H PRN Lita Alcantara DPM   pravastatin 20 mg Oral Daily With Dinner Lita Alcantara DPM   tamsulosin 0 4 mg Oral Daily With Dinner Lita Alcantara DPFELTON        Today, Patient Was Seen By: Ester Medina PA-C    ** Please Note: Dictation voice to text software may have been used in the creation of this document   **

## 2020-02-11 NOTE — PROGRESS NOTES
Progress Note - Infectious Disease   Cesar Webster 75 y o  male MRN: [id number]  Unit/Bed#: [place] [address] -01 Encounter: [id number]      Impression/Recommendations:  1  Left foot wound infection   Outpatient wound culture with growth of MDR E coli   I suspect this is super infection in a patient with open wounds on long-term IV cefazolin, which probably selected out this MDR pathogen   Patient is clinically and systemically well, without evidence of sepsis or systemic toxicity  Patient now status post ray amputation  Change antibiotic to p o  doxycycline  Serial foot exams  Treat x7 days postop      2  Left 1st MT head osteomyelitis, evident on x-ray   This is most likely secondary to super infection above   Patient is now status post left partial 1st ray amputation, for surgical cure  No further antibiotic needed for this  Wound care per Podiatry      3  CKD stage 3  Creatinine baseline  Antibiotic dosages adjusted accordingly  Monitor creatinine      4  DM with neuropathy   Hemoglobin A1c is mildly elevated but can be better controlled      Discussed with patient in detail regarding the above plan  Okay for discharge from ID viewpoint  PICC needs to be removed at discharge      Antibiotics:  Cefepime # 5  POD # 1      Subjective:  Patient is status post ray amputation  He feels well  Minimal foot pain  Temperature stays down  No chills  He is tolerating antibiotic well  No nausea, vomiting or diarrhea  Objective:  Vitals:  Temp:  [98 °F (36 7 °C)-98 2 °F (36 8 °C)] 98 °F (36 7 °C)  HR:  [58-74] 74  Resp:  [15-19] 18  BP: (145-160)/(62-86) 160/64  SpO2:  [94 %-100 %] 94 %  Temp (24hrs), Av 1 °F (36 7 °C), Min:98 °F (36 7 °C), Max:98 2 °F (36 8 °C)  Current: Temperature: 98 °F (36 7 °C)    Physical Exam:     General: Awake, alert, cooperative, no distress  Neck:  Supple  No mass  No lymphadenopathy     Lungs: Expansion symmetric, no rales, no wheezing, respirations unlabored  Heart:  Regular rate and rhythm, S1 and S2 normal, no murmur  Abdomen: Soft, nondistended, non-tender, bowel sounds active all four quadrants, no masses, no organomegaly  Extremities: No edema  Left foot with dressing in place  Dressing is dry  No erythema beyond dressing  Minimal tenderness  Skin:  No rash  Neuro: Moves all extremities  Invasive Devices     Peripherally Inserted Central Catheter Line            PICC Line 43/97/78 Right Basilic 28 days                Labs studies:   I have personally reviewed pertinent labs  Results from last 7 days   Lab Units 02/11/20  0945 02/10/20  0502 02/09/20  0528  02/07/20  1715   POTASSIUM mmol/L 4 7 4 9 5 1   < > 5 3   CHLORIDE mmol/L 106 105 105   < > 102   CO2 mmol/L 19* 22 22   < > 23   BUN mg/dL 41* 49* 57*   < > 72*   CREATININE mg/dL 1 63* 1 86* 1 93*   < > 2 05*   EGFR ml/min/1 73sq m 43 37 35   < > 33   CALCIUM mg/dL 8 1* 8 3 8 7   < > 8 6   AST U/L  --   --   --   --  14   ALT U/L  --   --   --   --  8*   ALK PHOS U/L  --   --   --   --  88    < > = values in this interval not displayed  Results from last 7 days   Lab Units 02/11/20  0945 02/10/20  0502 02/09/20  0528   WBC Thousand/uL 11 10* 8 60 9 88   HEMOGLOBIN g/dL 8 5* 7 9* 7 8*   PLATELETS Thousands/uL 299 284 297     Results from last 7 days   Lab Units 02/10/20  1812 02/10/20  1804   GRAM STAIN RESULT  No Polys or Bacteria seen No Polys or Bacteria seen       Imaging Studies:   I have personally reviewed pertinent imaging study reports and images in PACS  EKG, Pathology, and Other Studies:   I have personally reviewed pertinent reports

## 2020-02-11 NOTE — PROGRESS NOTES
Progress Note - Podiatry  Carey Calderon 75 y o  male MRN: [id number]  Unit/Bed#: [address] 213-01 Encounter: [id number]    Assessment:  1  Left foot surgical wound dehiscence with suspected underlying OM  - S/P left hallux amputation (DOS: 1/8/20)  - S/P left foot I&D with removal of sesamoids (DOS: 1/12/20)  - S/P left partial 1st ray amputation (DOS:2/10/20)  2  DM type 2 with neuropathy  3  Acute respiratory failure  4  Complete heart block - in situ permanent pacemaker  5  PAF  6  Acute diastolic congestive heart failure  7  CKD stage 3  8  Hx of Staph aureus bacteremia    Plan:  · Dressing change today with attending  Surgical incision is well coapted with sutures intact  No signs of dehiscence  Will continue to hold anticoagulation until tomorrow  · Nonweightbearing left lower extremity, patient may use heel touch for transfers and bathroom privileges  · Antibiotics changed to p o  Doxycycline per ID  Will treat with 7 days postoperatively  · WCx currently show no growth  Bone pathology pending  · If surgical incision continues to be stable tomorrow will possibly discharge patient in followup bone pathology as outpatient  Subjective/Objective   Chief Complaint:  Status post left foot surgery      Subjective: 75 y o  y/o male was seen and evaluated at bedside in no acute distress, nontoxic in appearance  Patient denies any recent nausea, vomiting, fever, chills, shortness of breath, chest pains  Blood pressure 160/64, pulse 74, temperature 98 °F (36 7 °C), temperature source Oral, resp  rate 18, height 5' 10" (1 778 m), weight 98 3 kg (216 lb 11 4 oz), SpO2 94 %  ,Body mass index is 31 09 kg/m²  Invasive Devices     Peripherally Inserted Central Catheter Line            PICC Line 08/19/40 Right Basilic 28 days                Physical Exam:   General: Alert, cooperative and no distress    Lower Extremity Exam:     NVS at baseline B/l  MSK function at baseline B/l   No calf tenderness noted B/l     LLE:  Partial 1st ray surgical incision is well coapted with sutures intact  Mild periwound erythema noted, however no active drainage  No ascending cellulitis, no purulence drainage  Capillary refill time to the surgical incision edges are brisk  No signs of gangrene  Lab, Imaging and other studies:   I have personally reviewed pertinent lab results  , CBC:   Lab Results   Component Value Date    WBC 11 10 (H) 02/11/2020    HGB 8 5 (L) 02/11/2020    HCT 27 5 (L) 02/11/2020    MCV 93 02/11/2020     02/11/2020    MCH 28 7 02/11/2020    MCHC 30 9 (L) 02/11/2020    RDW 14 6 02/11/2020    MPV 9 6 02/11/2020    NRBC 0 02/11/2020   , CMP:   Lab Results   Component Value Date    SODIUM 137 02/11/2020    K 4 7 02/11/2020     02/11/2020    CO2 19 (L) 02/11/2020    BUN 41 (H) 02/11/2020    CREATININE 1 63 (H) 02/11/2020    CALCIUM 8 1 (L) 02/11/2020    EGFR 43 02/11/2020       Imaging: I have personally reviewed pertinent films in PACS  EKG, Pathology, and Other Studies: I have personally reviewed pertinent reports  Portions of the record may have been created with voice recognition software  Occasional wrong word or "sound a like" substitutions may have occurred due to the inherent limitations of voice recognition software  Read the chart carefully and recognize, using context, where substitutions have occurred

## 2020-02-11 NOTE — ASSESSMENT & PLAN NOTE
· On iron supplementation  · Monitor H&H isaias operatively and transfuse as needed   · hgb stable today no active bleeding     Lab Results   Component Value Date    HGB 8 5 (L) 02/11/2020    HGB 7 9 (L) 02/10/2020    HGB 7 8 (L) 02/09/2020

## 2020-02-11 NOTE — ASSESSMENT & PLAN NOTE
Left arm swelling noted, new, no pain, erythema or rash   No signs of drug reaction, only new medication is doxycyline  No IV or PICC present in that arm   Check venous duplex

## 2020-02-11 NOTE — OCCUPATIONAL THERAPY NOTE
Occupational Therapy Evaluation     Patient Name: Yana MAGANA Date: 2/11/2020  Problem List  Principal Problem:    Diabetes mellitus with neuropathy ([facility] )  Active Problems:    Gout    Hyperlipidemia    Peripheral arterial disease ([facility] )    Complete heart block (HCC)    PAF (paroxysmal atrial fibrillation) ([facility])    NICOLASA (obstructive sleep apnea)    Acute diastolic (congestive) heart failure (HCC)    Essential hypertension    Stage 3 chronic kidney disease ([facility])    Iron deficiency anemia    Anxiety    Osteomyelitis of foot, left, acute ([facility] )    Past Medical History  Past Medical History:   Diagnosis Date    Arthritis     OA    Bruise of both arms     forearms and both hands    Bruises easily     CHF (congestive heart failure) ([facility] )     Diabetes mellitus ([facility] )     [facility]     Eczema     Erectile dysfunction     Gout     Hyperlipidemia     Hypertension     Murmur     Nephropathy     Osteoarthritis     PAD (peripheral artery disease) ([facility])     Seasonal allergies     Toe infection     bilat great toes    Vertigo     Walks frequently     Wears dentures     upper    Wears glasses      Past Surgical History  Past Surgical History:   Procedure Laterality Date    CARDIAC PACEMAKER PLACEMENT      CARPAL TUNNEL RELEASE Left     CARPAL TUNNEL RELEASE Right     CARPAL TUNNEL RELEASE      FOOT AMPUTATION Left 2/10/2020    Procedure: PARTIAL 1ST RAY AMPUTATION;  Surgeon: Alida Vargas DPM;  [facility];  Service: Podiatry    INCISION AND DRAINAGE OF WOUND Left 1/12/2020    Procedure: INCISION AND DRAINAGE (I&D) EXTREMITY AND REMOVAL OF SESMOID BONE;  Surgeon: Lionel Dixon DPM;  [facility];  Service: Podiatry    IR PICC REPO  1/14/2020    KNEE ARTHROSCOPY Left     KNEE ARTHROSCOPY Right     KNEE SURGERY      WV AMPUTATION TOE,I-P JT Bilateral 5/2/2017    Procedure: PARTIAL AMPUTATION RIGHT AND LEFT HALLUX ;  Surgeon: Alida Vargas DPM;  [facility];  Service: Podiatry    IA AMPUTATION TOE,MT-P JT Left 7/25/2017    Procedure: 2ND TOE AMPUTATION;  Surgeon: Brittaney Oviedo DPM;  [facility];  Service: Podiatry    SHOULDER ARTHROSCOPY Left     with screws,RTC    SHOULDER SURGERY      TOE AMPUTATION Left 1/8/2020    Procedure: [place] Nena Reas;  Surgeon: Denise Rolle DPM;  [facility];  Service: Podiatry    VASECTOMY             02/11/20 1205   Note Type   Note type Eval only   Restrictions/Precautions   Weight Bearing Precautions Per Order Yes   LLE Weight Bearing Per Order NWB  (may heel touch for bathroom priveledges and transfers)   Braces or Orthoses Other (Comment)  ([address] for L LE)   Other Precautions Multiple lines; Fall Risk;Contact/isolation   Pain Assessment   Pain Assessment No/denies pain   Pain Score No Pain   Home Living   Type of Home House   Home Layout Two level;Performs ADLs on one level; Able to live on main level with bedroom/bathroom  (0 LAURA)   Bathroom Shower/Tub Tub/shower unit   [employer] Raised   Bathroom Equipment Grab bars in shower; Shower chair;Commode   Bathroom Accessibility Accessible   Home Equipment Walker;Cane;Wheelchair-manual;Other (Comment)  ([place])   Additional Comments Pt lives w/ spouse and family in a two level house with 0 LAURA and 1st floor setup w/ bed and 1/2 bath available, FOS to full bath on 2nd  Pt sponge bathing at baseline, therefore did not need to access 2nd floor   Prior Function   Level of Sarita Independent with ADLs and functional mobility; Needs assistance with IADLs   Lives With Spouse; Other (Comment)  (2 children, 4 grandchildren)   Receives Help From Family   ADL Assistance Independent   IADLs Needs assistance   Falls in the last 6 months 0   Vocational Retired   Comments At baseline, pt was I w/ ADLs and functional mobility/transfers w/ use of RW (NWB L LE at baseline), required assist w/ IADLs, (+) , and reports 0 falls PTA     Lifestyle   Autonomy At baseline, pt was I w/ ADLs and functional mobility/transfers w/ use of RW (NWB L LE at baseline), required assist w/ IADLs, (+) , and reports 0 falls PTA  Reciprocal Relationships Spouse, 2 children, 4 grandchildren   Service to Others Retired- [address] Walking dog   Psychosocial   Psychosocial (WDL) WDL   ADL   Where Assessed Edge of bed   Eating Assistance 7  [address] [facility] 5  [address] 6  Modified independent   [address] 5  Supervision/Setup   Additional Comments Pt declines concerns regarding ADLs   Bed Mobility   Supine to Sit 6  Modified independent   Additional items HOB elevated; Bedrails; Increased time required   Sit to Supine 6  Modified independent   Additional items Increased time required   Additional Comments Pt lying supine at end of session w/ call bell and phone within reach   All needs met and pt reports no further questions for OT at this time   Transfers   Sit to Stand 6  Modified independent   Additional items Increased time required   Stand to Sit 6  Modified independent   Additional items Increased time required   Additional Comments Good adherence to WBS   Functional Mobility   Functional Mobility 5  Supervision   Additional Comments Assist x1; Good compliance to WBS   Additional items Rolling walker   Balance   Static Sitting Normal   Dynamic Sitting Good   Static Standing Fair +   Dynamic Standing Fair   Ambulatory Fair -   Activity Tolerance   Activity Tolerance Patient tolerated treatment well   Nurse Made Aware Zoran Sosa RN   RUE Assessment   RUE Assessment WFL   RUE Strength   RUE Overall Strength Within Functional Limits - able to perform ADL tasks with strength  (4+/5 throughout)   LUE Assessment   LUJAI Assessment WFL   LUE Strength   LUE Overall Strength Within Functional Limits - able to perform ADL tasks with strength  (4+/5 throughout)   Hand Function   Gross Motor Coordination Functional   Fine Motor Coordination Functional   Sensation   Light Touch No apparent deficits   Proprioception   Proprioception No apparent deficits   Vision-Basic Assessment   Current Vision Wears glasses all the time   Vision - Complex Assessment   Ocular Range of Motion [facility]   Acuity Able to read clock/calendar on wall without difficulty   Perception   Inattention/Neglect Appears intact   Cognition   Overall Cognitive Status [facility]   Arousal/Participation Alert; Cooperative   Attention Within functional limits   Orientation Level Oriented X4   Memory Within functional limits   Following Commands Follows all commands and directions without difficulty   Assessment   Prognosis Good   Assessment Pt is a 75 y o  male seen for OT evaluation s/p adm to [facility] on 2/[facility]/2020 w/ L foot surgical wound dehiscence with suspected underlying OM  Of note, pt w/ recent D/C from [facility] 1/29/20  Pt s/p L hallux amputation 1/8/20, s/p L foot I&D w/ removal of sesamoids 1/12/20, and s/p L partial 1st ray amputation 2/10/20  Pt w/ orders for "Nonweightbearing left lower extremity, patient may use heel touch for transfers and bathroom privileges " Comorbidities affecting pts functional performance include a significant PMH of Arthritis, CHF, DM, Eczema, Gout, HLD, HTN, Murmur, OA, PAD, and Vertigo  Pt with active OT orders and activity orders for Activity as tolerated  Pt lives w/ spouse and family in a two level house with 0 LAURA and 1st floor setup w/ bed and 1/2 bath available, FOS to full bath on 2nd  At baseline, pt was I w/ ADLs and functional mobility/transfers w/ use of RW (NWB L LE at baseline), required assist w/ IADLs, (+) , and reports 0 falls PTA   Upon evaluation, pt currently functioning at a [address] I for overall ADLs, Mod I  for bed mobility, Mod I for transfers, and  Supervision for functional mobility 2* the following deficits impacting occupational performance: decreased tolerance  These impairments, however do not limit pts ability to safely engage in all baseline areas of occupation, as pt is functioning near baseline level of performance and reports no concerns regarding returning home and completing ADLs  Pt scored overall 75/100 on the Barthel Index  Based on the aforementioned OT evaluation, functional performance deficits, and assessments, pt has been identified as a Moderate complexity evaluation  No further acute OT needs identified at this time  Recommend continued mobilization with hospital staff while in the hospital to increase pts endurance and strength upon D/C  From OT standpoint, recommend D/C to Home with family support when medically cleared  D/C pt from OT caseload at this time      Goals   Patient Goals To go home   Plan   OT Frequency Eval only   Recommendation   OT Discharge Recommendation Home with family support   OT - OK to Discharge Yes  (when medically cleared)   Barthel Index   Feeding 10   Bathing 5   Grooming Score 5   Dressing Score 10   Bladder Score 10   Bowels Score 10   Toilet Use Score 10   Transfers (Bed/Chair) Score 15   Mobility (Level Surface) Score 0  (<50 yards on eval)   Stairs Score 0   Barthel Index Score 75   Modified Range Scale   Modified Range Scale 2        Donal Smith, OTR/L

## 2020-02-11 NOTE — ASSESSMENT & PLAN NOTE
Wt Readings from Last 3 Encounters:   02/11/20 98 3 kg (216 lb 11 4 oz)   02/07/20 95 8 kg (211 lb 3 2 oz)   01/29/20 94 5 kg (208 lb 5 4 oz)     · Maintained on Lasix 40 mg BID and Zaroxolyn as needed  · Echo 1/10/20 EF 55%, moderate MS, mild MR, moderate AR, mild AS, mild to moderate TR, moderate pulmonary hypertension   · Nephrology onboard, recommendations and diuretic management appreciated   · on lasix 40mg bid - continue   · Monitor volume status closely isaias operatively   · Seen by Cardiology pre operatively   · No acute complaints currently   · Daily weight / I&O

## 2020-02-11 NOTE — ASSESSMENT & PLAN NOTE
· Maintained on amlodipine, metoprolol, and losartan  · Hold losartan for now with recent ELZBIETA  · Nephrology following   · Restarted amlodipine today   · Monitor VS

## 2020-02-11 NOTE — ASSESSMENT & PLAN NOTE
· Maintained on Metoprolol 50 mg BID and Eliquis  · Cardiology consulted  · Resume Eliquis post operatively as soon as acceptable from podiatry standpoint

## 2020-02-12 PROBLEM — M79.89 LEFT ARM SWELLING: Status: RESOLVED | Noted: 2020-02-11 | Resolved: 2020-02-12

## 2020-02-12 LAB
ANION GAP SERPL CALCULATED.3IONS-SCNC: 8 MMOL/L (ref 4–13)
BUN SERPL-MCNC: 39 MG/DL (ref 5–25)
CALCIUM SERPL-MCNC: 8.7 MG/DL (ref 8.3–10.1)
CHLORIDE SERPL-SCNC: 106 MMOL/L (ref 100–108)
CO2 SERPL-SCNC: 23 MMOL/L (ref 21–32)
CREAT SERPL-MCNC: 1.65 MG/DL (ref 0.6–1.3)
ERYTHROCYTE [DISTWIDTH] IN BLOOD BY AUTOMATED COUNT: 14.8 % (ref 11.6–15.1)
GFR SERPL CREATININE-BSD FRML MDRD: 43 ML/MIN/1.73SQ M
GLUCOSE SERPL-MCNC: 106 MG/DL (ref 65–140)
GLUCOSE SERPL-MCNC: 138 MG/DL (ref 65–140)
GLUCOSE SERPL-MCNC: 150 MG/DL (ref 65–140)
GLUCOSE SERPL-MCNC: 93 MG/DL (ref 65–140)
GLUCOSE SERPL-MCNC: 98 MG/DL (ref 65–140)
HCT VFR BLD AUTO: 25.2 % (ref 36.5–49.3)
HGB BLD-MCNC: 7.9 G/DL (ref 12–17)
MCH RBC QN AUTO: 29.2 PG (ref 26.8–34.3)
MCHC RBC AUTO-ENTMCNC: 31.3 G/DL (ref 31.4–37.4)
MCV RBC AUTO: 93 FL (ref 82–98)
PLATELET # BLD AUTO: 270 THOUSANDS/UL (ref 149–390)
PMV BLD AUTO: 9.9 FL (ref 8.9–12.7)
POTASSIUM SERPL-SCNC: 5.1 MMOL/L (ref 3.5–5.3)
RBC # BLD AUTO: 2.71 MILLION/UL (ref 3.88–5.62)
SODIUM SERPL-SCNC: 137 MMOL/L (ref 136–145)
WBC # BLD AUTO: 9.03 THOUSAND/UL (ref 4.31–10.16)

## 2020-02-12 PROCEDURE — 99232 SBSQ HOSP IP/OBS MODERATE 35: CPT | Performed by: PHYSICIAN ASSISTANT

## 2020-02-12 PROCEDURE — 80048 BASIC METABOLIC PNL TOTAL CA: CPT | Performed by: PHYSICIAN ASSISTANT

## 2020-02-12 PROCEDURE — 99232 SBSQ HOSP IP/OBS MODERATE 35: CPT | Performed by: INTERNAL MEDICINE

## 2020-02-12 PROCEDURE — 82948 REAGENT STRIP/BLOOD GLUCOSE: CPT

## 2020-02-12 PROCEDURE — 94660 CPAP INITIATION&MGMT: CPT

## 2020-02-12 PROCEDURE — 85027 COMPLETE CBC AUTOMATED: CPT | Performed by: PHYSICIAN ASSISTANT

## 2020-02-12 RX ADMIN — FERROUS GLUCONATE 324 MG: 324 TABLET ORAL at 06:30

## 2020-02-12 RX ADMIN — ALLOPURINOL 300 MG: 100 TABLET ORAL at 08:19

## 2020-02-12 RX ADMIN — DOXYCYCLINE 100 MG: 100 CAPSULE ORAL at 21:40

## 2020-02-12 RX ADMIN — FUROSEMIDE 40 MG: 40 TABLET ORAL at 17:21

## 2020-02-12 RX ADMIN — METOPROLOL TARTRATE 50 MG: 50 TABLET, FILM COATED ORAL at 08:18

## 2020-02-12 RX ADMIN — PRAVASTATIN SODIUM 20 MG: 20 TABLET ORAL at 17:21

## 2020-02-12 RX ADMIN — INSULIN LISPRO 1 UNITS: 100 INJECTION, SOLUTION INTRAVENOUS; SUBCUTANEOUS at 12:12

## 2020-02-12 RX ADMIN — Medication 1 TABLET: at 08:19

## 2020-02-12 RX ADMIN — TAMSULOSIN HYDROCHLORIDE 0.4 MG: 0.4 CAPSULE ORAL at 17:21

## 2020-02-12 RX ADMIN — METOPROLOL TARTRATE 50 MG: 50 TABLET, FILM COATED ORAL at 21:40

## 2020-02-12 RX ADMIN — Medication 1000 MG: at 17:21

## 2020-02-12 RX ADMIN — FERROUS GLUCONATE 324 MG: 324 TABLET ORAL at 17:21

## 2020-02-12 RX ADMIN — APIXABAN 5 MG: 5 TABLET, FILM COATED ORAL at 17:21

## 2020-02-12 RX ADMIN — FUROSEMIDE 40 MG: 40 TABLET ORAL at 08:19

## 2020-02-12 RX ADMIN — DOXYCYCLINE 100 MG: 100 CAPSULE ORAL at 08:18

## 2020-02-12 RX ADMIN — Medication 1000 MG: at 08:18

## 2020-02-12 RX ADMIN — AMLODIPINE BESYLATE 10 MG: 10 TABLET ORAL at 08:19

## 2020-02-12 NOTE — PROGRESS NOTES
Progress Note - Podiatry  Nabor Barros 75 y o  male MRN: [id number]  Unit/Bed#: [place] [address] -01 Encounter: [id number]    Assessment:  1  Left foot surgical wound dehiscence with suspected underlying OM  - S/P left hallux amputation (DOS: 1/8/20)  - S/P left foot I&D with removal of sesamoids (DOS: 1/12/20)  - S/P left partial 1st ray amputation (DOS:2/10/20)  2  DM type 2 with neuropathy  3  Acute respiratory failure  4  Complete heart block - in situ permanent pacemaker  5  PAF  6  Acute diastolic congestive heart failure  7  CKD stage 3  8  Hx of Staph aureus bacteremia    Plan:  · Dressing changed with attending today  Surgical incision continues to be well coapted with sutures intact  No signs of wound dehiscence  Patient will need an additional Theora Carmelita the hospital due to discharge needs at home  · Will restart Eliquis for 2 nights dose  · Continue p o  doxycycline per ID  Will treat with 7 days postoperatively  · Continue strict nonweightbearing on the left lower extremity  Patient may use heel touch for transfers and Bactrim cultures  · Bone Cx pending  · Will plan on discharge tomorrow once discharge needs are met  Patient will follow up with Dr Corrie Thomas on outpatient basis for postoperative follow-up once discharged  Subjective/Objective   Chief Complaint:  Status post left foot surgery      Subjective: 75 y o  y/o male was seen and evaluated at bedside in no acute distress, nontoxic in appearance  Patient denies any recent nausea, vomiting, fever, chills, shortness of breath, chest pains  Blood pressure 158/69, pulse 67, temperature (!) 97 4 °F (36 3 °C), temperature source Oral, resp  rate 18, height 5' 10" (1 778 m), weight 98 3 kg (216 lb 11 4 oz), SpO2 94 %  ,Body mass index is 31 09 kg/m²      Invasive Devices     Peripherally Inserted Central Catheter Line            PICC Line 12/88/30 Right Basilic 29 days                Physical Exam:   General: Alert, cooperative and no distress    Lower Extremity Exam:     NVS at baseline B/l  MSK function at baseline B/l  No calf tenderness noted B/l  LLE:  Dressing was c/d/i with no strike through to the outer dressing noted  Surgical incision is well coapted with sutures intact  Mild periwound erythema noted, but no ascending cellulitis  Mild sanguinous drainage on the previous dressings  No signs of necrosis  Lab, Imaging and other studies:   I have personally reviewed pertinent lab results  , CBC:   Lab Results   Component Value Date    WBC 9 03 02/12/2020    HGB 7 9 (L) 02/12/2020    HCT 25 2 (L) 02/12/2020    MCV 93 02/12/2020     02/12/2020    MCH 29 2 02/12/2020    MCHC 31 3 (L) 02/12/2020    RDW 14 8 02/12/2020    MPV 9 9 02/12/2020   , CMP:   Lab Results   Component Value Date    SODIUM 137 02/12/2020    K 5 1 02/12/2020     02/12/2020    CO2 23 02/12/2020    BUN 39 (H) 02/12/2020    CREATININE 1 65 (H) 02/12/2020    CALCIUM 8 7 02/12/2020    EGFR 43 02/12/2020       Imaging: I have personally reviewed pertinent films in PACS  EKG, Pathology, and Other Studies: I have personally reviewed pertinent reports  Portions of the record may have been created with voice recognition software  Occasional wrong word or "sound a like" substitutions may have occurred due to the inherent limitations of voice recognition software  Read the chart carefully and recognize, using context, where substitutions have occurred

## 2020-02-12 NOTE — PLAN OF CARE
Problem: Potential for Falls  Goal: Patient will remain free of falls  Description  INTERVENTIONS:  - Assess patient frequently for physical needs  -  Identify cognitive and physical deficits and behaviors that affect risk of falls    -  [place] fall precautions as indicated by assessment   - Educate patient/family on patient safety including physical limitations  - Instruct patient to call for assistance with activity based on assessment  - Modify environment to reduce risk of injury  - Consider OT/PT consult to assist with strengthening/mobility  Outcome: Progressing     Problem: INFECTION - ADULT  Goal: Absence or prevention of progression during hospitalization  Description  INTERVENTIONS:  - Assess and monitor for signs and symptoms of infection  - Monitor lab/diagnostic results  - Monitor all insertion sites, i e  indwelling lines, tubes, and drains  - Monitor endotracheal if appropriate and nasal secretions for changes in amount and color  - [place]helps appropriate cooling/warming therapies per order  - Administer medications as ordered  - Instruct and encourage patient and family to use good hand hygiene technique  - Identify and instruct in appropriate isolation precautions for identified infection/condition  Outcome: Progressing     Problem: SAFETY ADULT  Goal: Maintain or return to baseline ADL function  Description  INTERVENTIONS:  -  Assess patient's ability to carry out ADLs; assess patient's baseline for ADL function and identify physical deficits which impact ability to perform ADLs (bathing, care of mouth/teeth, toileting, grooming, dressing, etc )  - Assess/evaluate cause of self-care deficits   - Assess range of motion  - Assess patient's mobility; develop plan if impaired  - Assess patient's need for assistive devices and provide as appropriate  - Encourage maximum independence but intervene and supervise when necessary  - Involve family in performance of ADLs  - Assess for home care needs following discharge   - Consider OT consult to assist with ADL evaluation and planning for discharge  - Provide patient education as appropriate  Outcome: Progressing  Goal: Maintain or return mobility status to optimal level  Description  INTERVENTIONS:  - Assess patient's baseline mobility status (ambulation, transfers, stairs, etc )    - Identify cognitive and physical deficits and behaviors that affect mobility  - Identify mobility aids required to assist with transfers and/or ambulation (gait belt, sit-to-stand, lift, walker, cane, etc )  - [place] fall precautions as indicated by assessment  - Record patient progress and toleration of activity level on Mobility SBAR; progress patient to next Phase/Stage  - Instruct patient to call for assistance with activity based on assessment  - Consider rehabilitation consult to assist with strengthening/weightbearing, etc   Outcome: Progressing     Problem: DISCHARGE PLANNING  Goal: Discharge to home or other facility with appropriate resources  Description  INTERVENTIONS:  - Identify barriers to discharge w/patient and caregiver  - Arrange for needed discharge resources and transportation as appropriate  - Identify discharge learning needs (meds, wound care, etc )  - Arrange for interpretive services to assist at discharge as needed  - Refer to Case Management Department for coordinating discharge planning if the patient needs post-hospital services based on physician/advanced practitioner order or complex needs related to functional status, cognitive ability, or social support system  Outcome: Progressing     Problem: SKIN/TISSUE INTEGRITY - ADULT  Goal: Skin integrity remains intact  Description  INTERVENTIONS  - Identify patients at risk for skin breakdown  - Assess and monitor skin integrity  - Assess and monitor nutrition and hydration status  - Monitor labs (i e  albumin)  - Assess for incontinence   - Turn and reposition patient  - Assist with mobility/ambulation  - Relieve pressure over bony prominences  - Avoid friction and shearing  - Provide appropriate hygiene as needed including keeping skin clean and dry  - Evaluate need for skin moisturizer/barrier cream  - Collaborate with interdisciplinary team (i e  Nutrition, Rehabilitation, etc )   - Patient/family teaching  Outcome: Progressing  Goal: Incision(s), wounds(s) or drain site(s) healing without S/S of infection  Description  INTERVENTIONS  - Assess and document risk factors for skin impairment   - Assess and document dressing, incision, wound bed, drain sites and surrounding tissue  - Consider nutrition services referral as needed  - Oral mucous membranes remain intact  - Provide patient/ family education  Outcome: Progressing  Goal: Oral mucous membranes remain intact  Description  INTERVENTIONS  - Assess oral mucosa and hygiene practices  - Implement preventative oral hygiene regimen  - Implement oral medicated treatments as ordered  - Initiate Nutrition services referral as needed  Outcome: Progressing     Problem: Prexisting or High Potential for Compromised Skin Integrity  Goal: Skin integrity is maintained or improved  Description  INTERVENTIONS:  - Identify patients at risk for skin breakdown  - Assess and monitor skin integrity  - Assess and monitor nutrition and hydration status  - Monitor labs   - Assess for incontinence   - Turn and reposition patient  - Assist with mobility/ambulation  - Relieve pressure over bony prominences  - Avoid friction and shearing  - Provide appropriate hygiene as needed including keeping skin clean and dry  - Evaluate need for skin moisturizer/barrier cream  - Collaborate with interdisciplinary team   - Patient/family teaching  - Consider wound care consult   Outcome: Progressing

## 2020-02-12 NOTE — PROGRESS NOTES
NEPHROLOGY PROGRESS NOTE   Brandon Seo 75 y o  male MRN: [id number]  Unit/Bed#: [place] [id number] Encounter: [id number]      ASSESSMENT & PLAN    Essential (primary) hypertension  - blood pressures at home are averaging in the 474-792 systolic range  - given his age and comorbidities his goal blood pressure should be 125 to 135 over 70-80  - he is currently on amlodipine 10 mg daily, metoprolol 50 mg twice daily, losartan was decreased to 25 mg daily and now on hold and chlorthalidone was discontinued in the past  -blood pressures are in the 135-911 systolic range  -some signs of mild volume as well in the lower extremity  -continue furosemide 40 mg p o  B i d , metoprolol 50 mg every 12 hours,  amlodipine 10 mg daily, placed him on a low-potassium diet and will reinitiate losartan tomorrow potassium less than 5     Persistent proteinuria  - in the setting of prolonged diabetes  -he does have nephrotic range proteinuria serologic workup was negative in the past     Nephropathy  -serologies negative will monitor at this point     Gout  - on allopurinol 300 mg daily  - has been gout free, will monitor and continue     Bilateral leg edema  - left greater than right,  -now improved stable     Left renal artery stenosis  -renal artery Doppler showed 60% stenosis  -can be indication and cause of flash pulmonary edema  -mitral valve disease also present     Left foot osteomyelitis, peripheral vascular disease  -continue podiatric care       SUBJECTIVE:    Patient was seen today overall stable denies any chest pain shortness of breath fevers or chills ambulating with boot on left leg    OBJECTIVE:  Current Weight: Weight - Scale: 98 3 kg (216 lb 11 4 oz)  Vitals:    02/12/20 0725   BP: 158/69   Pulse: 67   Resp: 18   Temp: (!) 97 4 °F (36 3 °C)   SpO2: 94%     No intake or output data in the 24 hours ending 02/12/20 1044  Weight (last 2 days)     Date/Time   Weight    02/11/20 0600   98 3 (216 71)    02/10/20 0600   96 2 (212 08)              General: conscious, cooperative, in not acute distress  Eyes: conjunctivae pink, anicteric sclerae  ENT: lips and mucous membranes moist  Neck: supple, no JVD  Chest: clear breath sounds bilateral, no crackles, ronchus or wheezings  CVS: distinct S1 & S2, normal rate, regular rhythm  Abdomen: soft, non-tender, non-distended, normoactive bowel sounds  Extremities:  Dressing in boot on left lower  Skin: no rash  Neuro: awake, alert, oriented      Medications:    Current Facility-Administered Medications:     acetaminophen (TYLENOL) tablet 650 mg, 650 mg, Oral, Q6H PRN, Tim Fort, DPM    allopurinol (ZYLOPRIM) tablet 300 mg, 300 mg, Oral, QAM, Azalea Hinds, DPM, 300 mg at 02/12/20 0819    amLODIPine (NORVASC) tablet 10 mg, 10 mg, Oral, Daily, Tim Fort, DPM, 10 mg at 02/12/20 0313    doxycycline hyclate (VIBRAMYCIN) capsule 100 mg, 100 mg, Oral, Q12H [facility], Gregoria Medrano MD, 100 mg at 02/12/20 0818    ferrous gluconate (FERGON) tablet 324 mg, 324 mg, Oral, BID AC, Azalea Hinds, DPM, 324 mg at 02/12/20 0630    fish oil capsule 1,000 mg, 1,000 mg, Oral, BID, Tim Fort, DPM, 1,000 mg at 02/12/20 0818    furosemide (LASIX) tablet 40 mg, 40 mg, Oral, BID (diuretic), Eladio Patton, DO, 40 mg at 02/12/20 0819    insulin lispro (HumaLOG) 100 units/mL subcutaneous injection 1-6 Units, 1-6 Units, Subcutaneous, TID AC, 1 Units at 02/11/20 1727 **AND** Fingerstick Glucose (POCT), , , TID AC, Azalea Hinds, DPM    insulin lispro (HumaLOG) 100 units/mL subcutaneous injection 1-6 Units, 1-6 Units, Subcutaneous, HS, Azalea Hinds, DPM, 1 Units at 02/11/20 2107    LORazepam (ATIVAN) tablet 0 5 mg, 0 5 mg, Oral, Q8H PRN, Tim Fort, DPM, 0 5 mg at 02/09/20 2121    metoprolol tartrate (LOPRESSOR) tablet 50 mg, 50 mg, Oral, Q12H [facility], [facility] Hinds, DPM, 50 mg at 02/12/20 0818    multivitamin-minerals (CENTRUM) tablet 1 tablet, 1 tablet, Oral, Daily, Tim Fort, DPM, 1 tablet at 02/12/20 0819    oxyCODONE (ROXICODONE) IR tablet 5 mg, 5 mg, Oral, Q6H PRN, Marybeth Dakin, DPM, 5 mg at 02/07/20 1824    pravastatin (PRAVACHOL) tablet 20 mg, 20 mg, Oral, Daily With Dinner, Elejosafata Dakin, DPM, 20 mg at 02/11/20 1726    tamsulosin (FLOMAX) capsule 0 4 mg, 0 4 mg, Oral, Daily With Karen Null, DPM, 0 4 mg at 02/11/20 1726    Invasive Devices:      Lab Results:   Results from last 7 days   Lab Units 02/12/20  0629 02/11/20  0945 02/10/20  0502 02/09/20  0528 02/08/20  1439 02/08/20  0523 02/07/20  1715   WBC Thousand/uL 9 03 11 10* 8 60 9 88  --  9 29 12 38*   HEMOGLOBIN g/dL 7 9* 8 5* 7 9* 7 8*  --  7 5* 8 3*   HEMATOCRIT % 25 2* 27 5* 25 6* 25 2*  --  24 6* 26 6*   PLATELETS Thousands/uL 270 299 284 297  --  292 325   POTASSIUM mmol/L 5 1 4 7 4 9 5 1  --  5 0 5 3   CHLORIDE mmol/L 106 106 105 105  --  106 102   CO2 mmol/L 23 19* 22 22  --  22 23   BUN mg/dL 39* 41* 49* 57*  --  66* 72*   CREATININE mg/dL 1 65* 1 63* 1 86* 1 93*  --  2 02* 2 05*   CALCIUM mg/dL 8 7 8 1* 8 3 8 7  --  8 2* 8 6   ALK PHOS U/L  --   --   --   --   --   --  88   ALT U/L  --   --   --   --   --   --  8*   AST U/L  --   --   --   --   --   --  14   LEUKOCYTES UA   --   --   --   --  Negative  --   --    BLOOD UA   --   --   --   --  Moderate*  --   --        Previous work up:  Please see previous notes

## 2020-02-12 NOTE — ASSESSMENT & PLAN NOTE
· Recent LEAD's on 1/13 with diffuse disease without focal stenosis  · Maintained on Lovaza and simvastatin - continue both upon discharge

## 2020-02-12 NOTE — ASSESSMENT & PLAN NOTE
Wt Readings from Last 3 Encounters:   02/11/20 98 3 kg (216 lb 11 4 oz)   02/07/20 95 8 kg (211 lb 3 2 oz)   01/29/20 94 5 kg (208 lb 5 4 oz)     · Maintained on Lasix 40 mg BID and Zaroxolyn as needed  · Echo 1/10/20 EF 55%, moderate MS, mild MR, moderate AR, mild AS, mild to moderate TR, moderate pulmonary hypertension   · Nephrology onboard, recommendations and diuretic management appreciated   · on lasix 40mg bid - continue   · Appears euvolemic   · Seen by Cardiology pre operatively   · No acute complaints currently   · Daily weight / I&O

## 2020-02-12 NOTE — PLAN OF CARE
Problem: Potential for Falls  Goal: Patient will remain free of falls  Description  INTERVENTIONS:  - Assess patient frequently for physical needs  -  Identify cognitive and physical deficits and behaviors that affect risk of falls    -  [place] fall precautions as indicated by assessment   - Educate patient/family on patient safety including physical limitations  - Instruct patient to call for assistance with activity based on assessment  - Modify environment to reduce risk of injury  - Consider OT/PT consult to assist with strengthening/mobility  Outcome: Progressing     Problem: INFECTION - ADULT  Goal: Absence or prevention of progression during hospitalization  Description  INTERVENTIONS:  - Assess and monitor for signs and symptoms of infection  - Monitor lab/diagnostic results  - Monitor all insertion sites, i e  indwelling lines, tubes, and drains  - Monitor endotracheal if appropriate and nasal secretions for changes in amount and color  - [place] appropriate cooling/warming therapies per order  - Administer medications as ordered  - Instruct and encourage patient and family to use good hand hygiene technique  - Identify and instruct in appropriate isolation precautions for identified infection/condition  Outcome: Progressing     Problem: SAFETY ADULT  Goal: Maintain or return to baseline ADL function  Description  INTERVENTIONS:  -  Assess patient's ability to carry out ADLs; assess patient's baseline for ADL function and identify physical deficits which impact ability to perform ADLs (bathing, care of mouth/teeth, toileting, grooming, dressing, etc )  - Assess/evaluate cause of self-care deficits   - Assess range of motion  - Assess patient's mobility; develop plan if impaired  - Assess patient's need for assistive devices and provide as appropriate  - Encourage maximum independence but intervene and supervise when necessary  - Involve family in performance of ADLs  - Assess for home care needs following discharge   - Consider OT consult to assist with ADL evaluation and planning for discharge  - Provide patient education as appropriate  Outcome: Progressing  Goal: Maintain or return mobility status to optimal level  Description  INTERVENTIONS:  - Assess patient's baseline mobility status (ambulation, transfers, stairs, etc )    - Identify cognitive and physical deficits and behaviors that affect mobility  - Identify mobility aids required to assist with transfers and/or ambulation (gait belt, sit-to-stand, lift, walker, cane, etc )  - [place] fall precautions as indicated by assessment  - Record patient progress and toleration of activity level on Mobility SBAR; progress patient to next Phase/Stage  - Instruct patient to call for assistance with activity based on assessment  - Consider rehabilitation consult to assist with strengthening/weightbearing, etc   Outcome: Progressing     Problem: DISCHARGE PLANNING  Goal: Discharge to home or other facility with appropriate resources  Description  INTERVENTIONS:  - Identify barriers to discharge w/patient and caregiver  - Arrange for needed discharge resources and transportation as appropriate  - Identify discharge learning needs (meds, wound care, etc )  - Arrange for interpretive services to assist at discharge as needed  - Refer to Case Management Department for coordinating discharge planning if the patient needs post-hospital services based on physician/advanced practitioner order or complex needs related to functional status, cognitive ability, or social support system  Outcome: Progressing     Problem: SKIN/TISSUE INTEGRITY - ADULT  Goal: Skin integrity remains intact  Description  INTERVENTIONS  - Identify patients at risk for skin breakdown  - Assess and monitor skin integrity  - Assess and monitor nutrition and hydration status  - Monitor labs (i e  albumin)  - Assess for incontinence   - Turn and reposition patient  - Assist with mobility/ambulation  - Relieve pressure over bony prominences  - Avoid friction and shearing  - Provide appropriate hygiene as needed including keeping skin clean and dry  - Evaluate need for skin moisturizer/barrier cream  - Collaborate with interdisciplinary team (i e  Nutrition, Rehabilitation, etc )   - Patient/family teaching  Outcome: Progressing  Goal: Incision(s), wounds(s) or drain site(s) healing without S/S of infection  Description  INTERVENTIONS  - Assess and document risk factors for skin impairment   - Assess and document dressing, incision, wound bed, drain sites and surrounding tissue  - Consider nutrition services referral as needed  - Oral mucous membranes remain intact  - Provide patient/ family education  Outcome: Progressing  Goal: Oral mucous membranes remain intact  Description  INTERVENTIONS  - Assess oral mucosa and hygiene practices  - Implement preventative oral hygiene regimen  - Implement oral medicated treatments as ordered  - Initiate Nutrition services referral as needed  Outcome: Progressing     Problem: Prexisting or High Potential for Compromised Skin Integrity  Goal: Skin integrity is maintained or improved  Description  INTERVENTIONS:  - Identify patients at risk for skin breakdown  - Assess and monitor skin integrity  - Assess and monitor nutrition and hydration status  - Monitor labs   - Assess for incontinence   - Turn and reposition patient  - Assist with mobility/ambulation  - Relieve pressure over bony prominences  - Avoid friction and shearing  - Provide appropriate hygiene as needed including keeping skin clean and dry  - Evaluate need for skin moisturizer/barrier cream  - Collaborate with interdisciplinary team   - Patient/family teaching  - Consider wound care consult   Outcome: Progressing

## 2020-02-12 NOTE — SOCIAL WORK
CM met at bedside to confirm a transport home tomorrow; pt has a ride and is not in any need of CM making arrangements  Pt confirmed he was open with [facility] for wound care prior to admission  Referral was already sent, and pt was obviously accepted  AVS updated  TT PA to request an order for [facility] to be written at d/c  Fax for [facility] is        Jn Sung, GUILLERMO  2/12/2020  0321

## 2020-02-12 NOTE — PROGRESS NOTES
Progress Note - Wilfredo Santana 1955, 75 y o  male MRN: [id number]  Unit/Bed#: [place] [address] 2 [address] [place] [address] 213-01 Encounter: [id number]  Primary Care Provider: Clarissa Mckinney DO   Date and time admitted to hospital: 2/7/2020  4:09 PM    * Diabetes mellitus with neuropathy [facility])  Assessment & Plan  Lab Results   Component Value Date    HGBA1C 6 6 02/03/2020       Recent Labs     02/11/20  1146 02/11/20  1631 02/11/20  2102 02/12/20  0728   POCGLU 158* 181* 157* 98       Blood Sugar Average: Last 72 hrs:  (P) 127 [id number]   · Well controlled per last A1c   · Maintained on Xultophy (combination drug with basal insulin) 19 units daily, Metformin 500 mg BID  · Hold oral agent while here  · Monitor FSBS ac / hs and cover with SSI  No basal at this time, euglycemic  Adjust insulin doses as needed for tight glucose control for wound healing       Osteomyelitis of foot, left, acute (HCC)  Assessment & Plan  · Podiatry primary team  · With recent hospitalization, S/P left hallux amputation (DOS: 1/8/20) and S/P left foot I&D with removal of sesamoids (DOS: 1/12/20) and now with wound dehiscence  · s/p left partial 1st ray amputation 2/10, POD #2  · Had MSSA bacteremia, treated until yesterday with IV Ancef  PICC in place right arm  Repeat blood cultures to be performed after 2/20 to ensure eradication    · ID consulted, transitioned to po doxycyline to complete 7 days course post operatively  · Restart Eliquis per podiatry      Essential hypertension  Assessment & Plan  · Maintained on amlodipine, metoprolol, and losartan  · Hold losartan for now with recent ELZBIETA  · Nephrology following   · Restarted amlodipine   · Monitor VS    Acute diastolic (congestive) heart failure (HCC)  Assessment & Plan  Wt Readings from Last 3 Encounters:   02/11/20 98 3 kg (216 lb 11 4 oz)   02/07/20 95 8 kg (211 lb 3 2 oz)   01/29/20 94 5 kg (208 lb 5 4 oz)     · Maintained on Lasix 40 mg BID and Zaroxolyn as needed  · Echo 1/10/20 EF 55%, moderate MS, mild MR, moderate AR, mild AS, mild to moderate TR, moderate pulmonary hypertension   · Nephrology onboard, recommendations and diuretic management appreciated   · on lasix 40mg bid - continue   · Appears euvolemic   · Seen by Cardiology pre operatively   · No acute complaints currently   · Daily weight / I&O          Anxiety  Assessment & Plan  · Maintained on Ativan as needed      Complete heart block (HCC)  Assessment & Plan  · S/p pacemaker implantation  · Cardiology seen pre operatively     Hyperlipidemia  Assessment & Plan  · Maintained on simvastatin and Lovaza  · Continue    Iron deficiency anemia  Assessment & Plan  · On iron supplementation  · Monitor H&H isaias operatively and transfuse as needed   · hgb stable today no active bleeding   · Check H&H in AM with drop in hgb today     Lab Results   Component Value Date    HGB 7 9 (L) 02/12/2020    HGB 8 5 (L) 02/11/2020    HGB 7 9 (L) 02/10/2020         NICOLASA (obstructive sleep apnea)  Assessment & Plan  · Maintained on home CPAP therapy  · Continue    PAF (paroxysmal atrial fibrillation) ([facility])  Assessment & Plan  · Maintained on Metoprolol 50 mg BID and Eliquis  · Cardiology consulted  · Resume Eliquis post operatively as soon as acceptable from podiatry standpoint     Peripheral arterial disease ([facility] [address] )  Assessment & Plan  · Recent LEAD's on 1/13 with diffuse disease without focal stenosis  · Maintained on Lovaza and simvastatin - continue both upon discharge     Stage 3 chronic kidney disease ([facility] [address] )  Assessment & Plan  · Baseline variable, most recent 1 9-2 since last admission  · Nephrology on board - continue with recommendations   · Monitor renal function, stable today   · K+ 5 1 upper limits of normal, check BMP in AM     Lab Results   Component Value Date    CREATININE 1 65 (H) 02/12/2020    CREATININE 1 63 (H) 02/11/2020    CREATININE 1 86 (H) 02/10/2020         Left arm swellingresolved as of 2/12/2020  Assessment & Plan  Left arm swelling noted , new, no pain, erythema or rash   Resolved today   Venous duplex negative         VTE Pharmacologic Prophylaxis:   Pharmacologic: Heparin  Mechanical VTE Prophylaxis in Place: No    Patient Centered Rounds: I have performed bedside rounds with nursing staff today  Discussions with Specialists or Other Care Team Provider:     Education and Discussions with Family / Patient: patient     Time Spent for Care: 20 minutes  More than 50% of total time spent on counseling and coordination of care as described above  Current Length of Stay: 5 day(s)    Current Patient Status: Inpatient   Certification Statement: The patient will continue to require additional inpatient hospital stay due to left OM     Discharge Plan: per podiatry     Code Status: Level 1 - Full Code      Subjective:   Patient doing well  No acute complaints  Had BM yesterday  No urinary symptoms  No chest pain or OSB  No apin in foot  No bleeding anywhere  Swelling left arm resolved  Mild red rash on back seen yesterday unchanged, asymptomatic     Objective:     Vitals:   Temp (24hrs), Av 9 °F (36 6 °C), Min:97 4 °F (36 3 °C), Max:98 1 °F (36 7 °C)    Temp:  [97 4 °F (36 3 °C)-98 1 °F (36 7 °C)] 97 4 °F (36 3 °C)  HR:  [60-69] 67  Resp:  [18] 18  BP: (142-158)/(56-69) 158/69  SpO2:  [94 %-99 %] 94 %  Body mass index is 31 09 kg/m²  Input and Output Summary (last 24 hours):     No intake or output data in the 24 hours ending 20 0948    Physical Exam:     Physical Exam   Constitutional: No distress  HENT:   Head: Normocephalic  Eyes: Conjunctivae are normal    Neck: Neck supple  Cardiovascular: Normal rate and regular rhythm  Pulmonary/Chest: Effort normal and breath sounds normal    Abdominal: Soft  Bowel sounds are normal    Musculoskeletal: He exhibits edema (trace LE) and deformity (dressing and surgical shoe on left foot)  Neurological: He is alert  Skin: Skin is warm  Psychiatric: He has a normal mood and affect  Nursing note and vitals reviewed  Additional Data:     Labs:    Results from last 7 days   Lab Units 02/12/20  0629 02/11/20  0945   WBC Thousand/uL 9 03 11 10*   HEMOGLOBIN g/dL 7 9* 8 5*   HEMATOCRIT % 25 2* 27 5*   PLATELETS Thousands/uL 270 299   NEUTROS PCT %  --  71   LYMPHS PCT %  --  8*   MONOS PCT %  --  8   EOS PCT %  --  11*     Results from last 7 days   Lab Units 02/12/20  0629  02/07/20  1715   SODIUM mmol/L 137   < > 134*   POTASSIUM mmol/L 5 1   < > 5 3   CHLORIDE mmol/L 106   < > 102   CO2 mmol/L 23   < > 23   BUN mg/dL 39*   < > 72*   CREATININE mg/dL 1 65*   < > 2 05*   ANION GAP mmol/L 8   < > 9   CALCIUM mg/dL 8 7   < > 8 6   ALBUMIN g/dL  --   --  2 3*   TOTAL BILIRUBIN mg/dL  --   --  0 33   ALK PHOS U/L  --   --  88   ALT U/L  --   --  8*   AST U/L  --   --  14   GLUCOSE RANDOM mg/dL 93   < > 101    < > = values in this interval not displayed  Results from last 7 days   Lab Units 02/12/20  0728 02/11/20  2102 02/11/20  1631 02/11/20  1146 02/11/20  0753 02/10/20  2107 02/10/20  1848 02/10/20  1706 02/10/20  1139 02/10/20  0722 02/09/20  2102 02/09/20  1621   POC GLUCOSE mg/dl 98 157* 181* 158* 90 97 103 110 115 122 139 173*                   * I Have Reviewed All Lab Data Listed Above  * Additional Pertinent Lab Tests Reviewed:  All Labs Within Last 24 Hours Reviewed    Imaging:    Imaging Reports Reviewed Today Include:   Imaging Personally Reviewed by Myself Includes:  none    Recent Cultures (last 7 days):     Results from last 7 days   Lab Units 02/10/20  1812 02/10/20  1804   GRAM STAIN RESULT  No Polys or Bacteria seen No Polys or Bacteria seen   WOUND CULTURE   --  No growth       Last 24 Hours Medication List:     Current Facility-Administered Medications:  acetaminophen 650 mg Oral Q6H PRN Lazarus Barton, DPM   allopurinol 300 mg Oral QAM Azalea Hinds DPM   amLODIPine 10 mg Oral Daily Lazarus Barton, DPM   doxycycline hyclate 100 mg Oral Q12H Mazin Dumont MD   ferrous gluconate 324 mg Oral BID AC Jacy Chant, DPM   fish oil 1,000 mg Oral BID Jacy Chant, DPM   furosemide 40 mg Oral BID (diuretic) Eladio Patton, DO   insulin lispro 1-6 Units Subcutaneous TID AC Azalea Hinds, DPM   insulin lispro 1-6 Units Subcutaneous HS Azalea Hinds, DPM   LORazepam 0 5 mg Oral Q8H PRN Jacy Chant, DPM   metoprolol tartrate 50 mg Oral Q12H [address] Jacy Chant, DPM   multivitamin-minerals 1 tablet Oral Daily Jacy Chant, DPM   oxyCODONE 5 mg Oral Q6H PRN Jacy Chant, DPM   pravastatin 20 mg Oral Daily With Dinner Jacy Chant, DPM   tamsulosin 0 4 mg Oral Daily With [address] [facility], DPM        Today, Patient Was Seen By: Nikki Meléndez PA-C    ** Please Note: Dictation voice to text software may have been used in the creation of this document   **

## 2020-02-12 NOTE — ASSESSMENT & PLAN NOTE
· On iron supplementation  · Monitor H&H isaias operatively and transfuse as needed   · hgb stable today no active bleeding   · Check H&H in AM with drop in hgb today     Lab Results   Component Value Date    HGB 7 9 (L) 02/12/2020    HGB 8 5 (L) 02/11/2020    HGB 7 9 (L) 02/10/2020

## 2020-02-12 NOTE — PROGRESS NOTES
Progress Note - Infectious Disease   Oradell Pro 75 y o  male MRN: [id number]  Unit/Bed#: [place] [address] -01 Encounter: [id number]      Impression/Recommendations:  1  Left foot wound infection   Outpatient wound culture with growth of MDR E coli   I suspect this is super infection in a patient with open wounds on long-term IV cefazolin, which probably selected out this MDR pathogen   Patient is clinically and systemically well, without evidence of sepsis or systemic toxicity  Patient now status post ray amputation  Continue p o  doxycycline  Serial foot exams  Treat x7 days postop      2  Left 1st MT head osteomyelitis, evident on x-ray   This is most likely secondary to super infection above   Patient is now status post left partial 1st ray amputation, for surgical cure  No further antibiotic needed for this  Wound care per Podiatry      3  CKD stage 3  Creatinine baseline  Doxycycline does not need dose adjustment  Monitor creatinine      4  DM with neuropathy   Hemoglobin A1c is mildly elevated but can be better controlled      Discussed with patient in detail regarding the above plan  Okay for discharge from ID viewpoint  PICC needs to be removed at discharge      Antibiotics:  Doxycycline  POD # 2     Subjective:  Patient feels well  Minimal foot pain  Temperature stays down   No chills  He is tolerating antibiotic well   No nausea, vomiting or diarrhea  Objective:  Vitals:  Temp:  [97 4 °F (36 3 °C)-98 1 °F (36 7 °C)] 97 4 °F (36 3 °C)  HR:  [60-69] 67  Resp:  [18] 18  BP: (142-158)/(56-69) 158/69  SpO2:  [94 %-99 %] 94 %  Temp (24hrs), Av 9 °F (36 6 °C), Min:97 4 °F (36 3 °C), Max:98 1 °F (36 7 °C)  Current: Temperature: (!) 97 4 °F (36 3 °C)    Physical Exam:     General: Awake, alert, cooperative, no distress  Neck:  Supple  No mass  No lymphadenopathy  Lungs: Expansion symmetric, no rales, no wheezing, respirations unlabored     Heart:  Regular rate and rhythm, S1 and S2 normal, no murmur  Abdomen: Soft, nondistended, non-tender, bowel sounds active all four quadrants, no masses, no organomegaly  Extremities: No edema  Left foot with dressing in place  Dressing is dry  No erythema/warmth beyond dressing  No tenderness  Skin:  No rash  Neuro: Moves all extremities  Invasive Devices     Peripherally Inserted Central Catheter Line            PICC Line 08/61/72 Right Basilic 30 days                Labs studies:   I have personally reviewed pertinent labs  Results from last 7 days   Lab Units 02/12/20  0629 02/11/20  0945 02/10/20  0502  02/07/20  1715   POTASSIUM mmol/L 5 1 4 7 4 9   < > 5 3   CHLORIDE mmol/L 106 106 105   < > 102   CO2 mmol/L 23 19* 22   < > 23   BUN mg/dL 39* 41* 49*   < > 72*   CREATININE mg/dL 1 65* 1 63* 1 86*   < > 2 05*   EGFR ml/min/1 73sq m 43 43 37   < > 33   CALCIUM mg/dL 8 7 8 1* 8 3   < > 8 6   AST U/L  --   --   --   --  14   ALT U/L  --   --   --   --  8*   ALK PHOS U/L  --   --   --   --  88    < > = values in this interval not displayed  Results from last 7 days   Lab Units 02/12/20  0629 02/11/20  0945 02/10/20  0502   WBC Thousand/uL 9 03 11 10* 8 60   HEMOGLOBIN g/dL 7 9* 8 5* 7 9*   PLATELETS Thousands/uL 270 299 284     Results from last 7 days   Lab Units 02/10/20  1812 02/10/20  1804   GRAM STAIN RESULT  No Polys or Bacteria seen No Polys or Bacteria seen   WOUND CULTURE   --  No growth       Imaging Studies:   I have personally reviewed pertinent imaging study reports and images in PACS  EKG, Pathology, and Other Studies:   I have personally reviewed pertinent reports

## 2020-02-12 NOTE — ASSESSMENT & PLAN NOTE
· Baseline variable, most recent 1 9-2 since last admission  · Nephrology on board - continue with recommendations   · Monitor renal function, stable today     Lab Results   Component Value Date    CREATININE 1 65 (H) 02/12/2020    CREATININE 1 63 (H) 02/11/2020    CREATININE 1 86 (H) 02/10/2020

## 2020-02-12 NOTE — ASSESSMENT & PLAN NOTE
Left arm swelling noted 2/11, new, no pain, erythema or rash   Resolved today   Venous duplex negative

## 2020-02-12 NOTE — ASSESSMENT & PLAN NOTE
· Maintained on amlodipine, metoprolol, and losartan  · Hold losartan for now with recent ELZBIETA  · Nephrology following   · Restarted amlodipine   · Monitor VS

## 2020-02-12 NOTE — ASSESSMENT & PLAN NOTE
· Podiatry primary team  · With recent hospitalization, S/P left hallux amputation (DOS: 1/8/20) and S/P left foot I&D with removal of sesamoids (DOS: 1/12/20) and now with wound dehiscence  · s/p left partial 1st ray amputation 2/10, POD #1   · Had MSSA bacteremia, treated until yesterday with IV Ancef  PICC in place right arm  Repeat blood cultures to be performed after 2/20 to ensure eradication    · ID consulted, transitioned to po doxycyline to complete 7 days course post operatively  · Restart Eliquis per podiatry

## 2020-02-13 VITALS
DIASTOLIC BLOOD PRESSURE: 80 MMHG | SYSTOLIC BLOOD PRESSURE: 142 MMHG | WEIGHT: 211.2 LBS | HEART RATE: 65 BPM | TEMPERATURE: 97.5 F | HEIGHT: 70 IN | RESPIRATION RATE: 17 BRPM | BODY MASS INDEX: 30.24 KG/M2 | OXYGEN SATURATION: 97 %

## 2020-02-13 PROBLEM — M86.172 OSTEOMYELITIS OF FOOT, LEFT, ACUTE (HCC): Status: RESOLVED | Noted: 2020-02-07 | Resolved: 2020-02-13

## 2020-02-13 LAB
ANION GAP SERPL CALCULATED.3IONS-SCNC: 8 MMOL/L (ref 4–13)
BACTERIA SPEC ANAEROBE CULT: NO GROWTH
BUN SERPL-MCNC: 37 MG/DL (ref 5–25)
CALCIUM SERPL-MCNC: 8.5 MG/DL (ref 8.3–10.1)
CHLORIDE SERPL-SCNC: 107 MMOL/L (ref 100–108)
CO2 SERPL-SCNC: 23 MMOL/L (ref 21–32)
CREAT SERPL-MCNC: 1.52 MG/DL (ref 0.6–1.3)
GFR SERPL CREATININE-BSD FRML MDRD: 47 ML/MIN/1.73SQ M
GLUCOSE SERPL-MCNC: 109 MG/DL (ref 65–140)
GLUCOSE SERPL-MCNC: 138 MG/DL (ref 65–140)
GLUCOSE SERPL-MCNC: 91 MG/DL (ref 65–140)
HCT VFR BLD AUTO: 24.9 % (ref 36.5–49.3)
HGB BLD-MCNC: 7.7 G/DL (ref 12–17)
POTASSIUM SERPL-SCNC: 4.7 MMOL/L (ref 3.5–5.3)
SODIUM SERPL-SCNC: 138 MMOL/L (ref 136–145)

## 2020-02-13 PROCEDURE — 94660 CPAP INITIATION&MGMT: CPT

## 2020-02-13 PROCEDURE — 80048 BASIC METABOLIC PNL TOTAL CA: CPT | Performed by: PHYSICIAN ASSISTANT

## 2020-02-13 PROCEDURE — 99232 SBSQ HOSP IP/OBS MODERATE 35: CPT | Performed by: INTERNAL MEDICINE

## 2020-02-13 PROCEDURE — 99233 SBSQ HOSP IP/OBS HIGH 50: CPT | Performed by: NURSE PRACTITIONER

## 2020-02-13 PROCEDURE — 82948 REAGENT STRIP/BLOOD GLUCOSE: CPT

## 2020-02-13 PROCEDURE — 85018 HEMOGLOBIN: CPT | Performed by: PHYSICIAN ASSISTANT

## 2020-02-13 PROCEDURE — 85014 HEMATOCRIT: CPT | Performed by: PHYSICIAN ASSISTANT

## 2020-02-13 RX ORDER — LOSARTAN POTASSIUM 25 MG/1
25 TABLET ORAL DAILY
Start: 2020-02-13 | End: 2020-03-22

## 2020-02-13 RX ORDER — DOXYCYCLINE HYCLATE 100 MG/1
100 CAPSULE ORAL EVERY 12 HOURS SCHEDULED
Qty: 8 CAPSULE | Refills: 0 | Status: SHIPPED | OUTPATIENT
Start: 2020-02-13 | End: 2020-02-20 | Stop reason: HOSPADM

## 2020-02-13 RX ORDER — LOSARTAN POTASSIUM 25 MG/1
25 TABLET ORAL DAILY
Status: DISCONTINUED | OUTPATIENT
Start: 2020-02-14 | End: 2020-02-13 | Stop reason: HOSPADM

## 2020-02-13 RX ADMIN — ALLOPURINOL 300 MG: 100 TABLET ORAL at 08:13

## 2020-02-13 RX ADMIN — METOPROLOL TARTRATE 50 MG: 50 TABLET, FILM COATED ORAL at 08:16

## 2020-02-13 RX ADMIN — Medication 1 TABLET: at 08:13

## 2020-02-13 RX ADMIN — FERROUS GLUCONATE 324 MG: 324 TABLET ORAL at 06:17

## 2020-02-13 RX ADMIN — APIXABAN 5 MG: 5 TABLET, FILM COATED ORAL at 08:13

## 2020-02-13 RX ADMIN — DOXYCYCLINE 100 MG: 100 CAPSULE ORAL at 08:13

## 2020-02-13 RX ADMIN — Medication 1000 MG: at 08:13

## 2020-02-13 RX ADMIN — FUROSEMIDE 40 MG: 40 TABLET ORAL at 08:16

## 2020-02-13 RX ADMIN — AMLODIPINE BESYLATE 10 MG: 10 TABLET ORAL at 08:16

## 2020-02-13 NOTE — ASSESSMENT & PLAN NOTE
Wt Readings from Last 3 Encounters:   02/13/20 95 8 kg (211 lb 3 2 oz)   02/07/20 95 8 kg (211 lb 3 2 oz)   01/29/20 94 5 kg (208 lb 5 4 oz)     · Maintained on Lasix 40 mg BID and Zaroxolyn as needed  · Echo 1/10/20 EF 55%, moderate MS, mild MR, moderate AR, mild AS, mild to moderate TR, moderate pulmonary hypertension   · Nephrology onboard, recommendations and diuretic management appreciated   · On Lasix 40 mg BID - continue   · Appears euvolemic   · Seen by Cardiology pre-operatively   · No acute complaints currently   · Continue follow up with Cardiology

## 2020-02-13 NOTE — DISCHARGE INSTRUCTIONS
Podiatry Instructions  · Please keep dressing on the left foot clean, dry, and intact  Please change dressing every 2 days with Xeroform, dry sterile dressing, Kerlix, and an ACE wrap  · Please continue to be nonweightbearing on the left lower extremity at all times  · Please take antibiotics as prescribed until completed  · Please follow up with Dr Linnell Klinefelter at our office within 1 week discharge for postoperative follow-up

## 2020-02-13 NOTE — ASSESSMENT & PLAN NOTE
· On iron supplementation  · Hgb stable   Lab Results   Component Value Date    HGB 7 7 (L) 02/13/2020    HGB 7 9 (L) 02/12/2020    HGB 8 5 (L) 02/11/2020

## 2020-02-13 NOTE — NURSING NOTE
AVS provided to patient, RN reviewed with patient and patient verbalized understanding of all instructions provided  Patient discharged to home with all belongings

## 2020-02-13 NOTE — PLAN OF CARE
Problem: Potential for Falls  Goal: Patient will remain free of falls  Description  INTERVENTIONS:  - Assess patient frequently for physical needs  -  Identify cognitive and physical deficits and behaviors that affect risk of falls    -  [place] fall precautions as indicated by assessment   - Educate patient/family on patient safety including physical limitations  - Instruct patient to call for assistance with activity based on assessment  - Modify environment to reduce risk of injury  - Consider OT/PT consult to assist with strengthening/mobility  Outcome: Progressing     Problem: INFECTION - ADULT  Goal: Absence or prevention of progression during hospitalization  Description  INTERVENTIONS:  - Assess and monitor for signs and symptoms of infection  - Monitor lab/diagnostic results  - Monitor all insertion sites, i e  indwelling lines, tubes, and drains  - Monitor endotracheal if appropriate and nasal secretions for changes in amount and color  - [place] appropriate cooling/warming therapies per order  - Administer medications as ordered  - Instruct and encourage patient and family to use good hand hygiene technique  - Identify and instruct in appropriate isolation precautions for identified infection/condition  Outcome: Progressing     Problem: SAFETY ADULT  Goal: Maintain or return to baseline ADL function  Description  INTERVENTIONS:  -  Assess patient's ability to carry out ADLs; assess patient's baseline for ADL function and identify physical deficits which impact ability to perform ADLs (bathing, care of mouth/teeth, toileting, grooming, dressing, etc )  - Assess/evaluate cause of self-care deficits   - Assess range of motion  - Assess patient's mobility; develop plan if impaired  - Assess patient's need for assistive devices and provide as appropriate  - Encourage maximum independence but intervene and supervise when necessary  - Involve family in performance of ADLs  - Assess for home care needs following discharge   - Consider OT consult to assist with ADL evaluation and planning for discharge  - Provide patient education as appropriate  Outcome: Progressing  Goal: Maintain or return mobility status to optimal level  Description  INTERVENTIONS:  - Assess patient's baseline mobility status (ambulation, transfers, stairs, etc )    - Identify cognitive and physical deficits and behaviors that affect mobility  - Identify mobility aids required to assist with transfers and/or ambulation (gait belt, sit-to-stand, lift, walker, cane, etc )  - [place] fall precautions as indicated by assessment  - Record patient progress and toleration of activity level on Mobility SBAR; progress patient to next Phase/Stage  - Instruct patient to call for assistance with activity based on assessment  - Consider rehabilitation consult to assist with strengthening/weightbearing, etc   Outcome: Progressing     Problem: DISCHARGE PLANNING  Goal: Discharge to home or other facility with appropriate resources  Description  INTERVENTIONS:  - Identify barriers to discharge w/patient and caregiver  - Arrange for needed discharge resources and transportation as appropriate  - Identify discharge learning needs (meds, wound care, etc )  - Arrange for interpretive services to assist at discharge as needed  - Refer to Case Management Department for coordinating discharge planning if the patient needs post-hospital services based on physician/advanced practitioner order or complex needs related to functional status, cognitive ability, or social support system  Outcome: Progressing     Problem: SKIN/TISSUE INTEGRITY - ADULT  Goal: Skin integrity remains intact  Description  INTERVENTIONS  - Identify patients at risk for skin breakdown  - Assess and monitor skin integrity  - Assess and monitor nutrition and hydration status  - Monitor labs (i e  albumin)  - Assess for incontinence   - Turn and reposition patient  - Assist with mobility/ambulation  - Relieve pressure over bony prominences  - Avoid friction and shearing  - Provide appropriate hygiene as needed including keeping skin clean and dry  - Evaluate need for skin moisturizer/barrier cream  - Collaborate with interdisciplinary team (i e  Nutrition, Rehabilitation, etc )   - Patient/family teaching  Outcome: Progressing  Goal: Incision(s), wounds(s) or drain site(s) healing without S/S of infection  Description  INTERVENTIONS  - Assess and document risk factors for skin impairment   - Assess and document dressing, incision, wound bed, drain sites and surrounding tissue  - Consider nutrition services referral as needed  - Oral mucous membranes remain intact  - Provide patient/ family education  Outcome: Progressing  Goal: Oral mucous membranes remain intact  Description  INTERVENTIONS  - Assess oral mucosa and hygiene practices  - Implement preventative oral hygiene regimen  - Implement oral medicated treatments as ordered  - Initiate Nutrition services referral as needed  Outcome: Progressing     Problem: Prexisting or High Potential for Compromised Skin Integrity  Goal: Skin integrity is maintained or improved  Description  INTERVENTIONS:  - Identify patients at risk for skin breakdown  - Assess and monitor skin integrity  - Assess and monitor nutrition and hydration status  - Monitor labs   - Assess for incontinence   - Turn and reposition patient  - Assist with mobility/ambulation  - Relieve pressure over bony prominences  - Avoid friction and shearing  - Provide appropriate hygiene as needed including keeping skin clean and dry  - Evaluate need for skin moisturizer/barrier cream  - Collaborate with interdisciplinary team   - Patient/family teaching  - Consider wound care consult   Outcome: Progressing

## 2020-02-13 NOTE — PROGRESS NOTES
[facility] Internal [facility]  Progress Note - Mariposa Guillen 1955, 75 y o  male MRN: [id number]    Unit/Bed#: [place] [address] 2 [address] [place] [address] 213-01 Encounter: [id number]    Primary Care Provider: Vince Wright DO   Date and time admitted to hospital: 2/7/2020  4:09 PM        * Diabetes mellitus with neuropathy [facility])  Assessment & Plan  Lab Results   Component Value Date    HGBA1C 6 6 02/03/2020       Recent Labs     02/12/20  1125 02/12/20  1643 02/12/20  2124 02/13/20  0713   POCGLU 150* 138 106 109       Blood Sugar Average: Last 72 hrs:  (P) 123 [id number]   · Well controlled per last A1c   · Maintained on Xultophy (combination drug with basal insulin) 19 units daily, Metformin 500 mg BID  · Monitor blood sugars at home  Follow up with PCP  Restart basal insulin when blood sugars are increasing        Stage 3 chronic kidney disease (HCC)  Assessment & Plan  · Baseline variable, most recent 1 9-2 since last admission  · Nephrology follow up  · Losartan restarted    Lab Results   Component Value Date    CREATININE 1 52 (H) 02/13/2020    CREATININE 1 65 (H) 02/12/2020    CREATININE 1 63 (H) 02/11/2020         Acute diastolic (congestive) heart failure (HCC)  Assessment & Plan  Wt Readings from Last 3 Encounters:   02/13/20 95 8 kg (211 lb 3 2 oz)   02/07/20 95 8 kg (211 lb 3 2 oz)   01/29/20 94 5 kg (208 lb 5 4 oz)     · Maintained on Lasix 40 mg BID and Zaroxolyn as needed  · Echo 1/10/20 EF 55%, moderate MS, mild MR, moderate AR, mild AS, mild to moderate TR, moderate pulmonary hypertension   · Nephrology onboard, recommendations and diuretic management appreciated   · On Lasix 40 mg BID - continue   · Appears euvolemic   · Seen by Cardiology pre-operatively   · No acute complaints currently   · Continue follow up with Cardiology          [facility]  Assessment & Plan  · Recent LEAD's on 1/13 with diffuse disease without focal stenosis  · Maintained on Lovaza and simvastatin - continue both upon discharge     Osteomyelitis of foot, left, acute (HCC)resolved as of 2/13/2020  Assessment & Plan  · Podiatry primary team  · With recent hospitalization, S/P left hallux amputation (DOS: 1/8/20) and S/P left foot I&D with removal of sesamoids (DOS: 1/12/20) and now with wound dehiscence  · s/p left partial 1st ray amputation 2/10, POD #3  · Had MSSA bacteremia, treated until just prior to admission with IV Ancef  PICC in place right arm, discontinue prior to discharge  Repeat blood cultures to be performed after 2/20 to ensure eradication    · ID consulted, transitioned to po doxycyline to complete 7 days course post operatively  · Restarted Eliquis per podiatry      Anxiety  Assessment & Plan  · Maintained on Ativan as needed      Iron deficiency anemia  Assessment & Plan  · On iron supplementation  · Hgb stable   Lab Results   Component Value Date    HGB 7 7 (L) 02/13/2020    HGB 7 9 (L) 02/12/2020    HGB 8 5 (L) 02/11/2020         Essential hypertension  Assessment & Plan  · Maintained on amlodipine, metoprolol, and losartan  · Hold losartan for now with recent ELZBIETA  · Nephrology followed  · Resume all home anti-hypertensives    NICOLASA (obstructive sleep apnea)  Assessment & Plan  · Maintained on home CPAP therapy    PAF (paroxysmal atrial fibrillation) (HCC)  Assessment & Plan  · Maintained on Metoprolol 50 mg BID and Eliquis  · Cardiology consulted  · Eliquis resumed    Complete heart block ([address] )  Assessment & Plan  · S/p pacemaker implantation  · Cardiology seen pre operatively   · Cardiology follow up    Hyperlipidemia  Assessment & Plan  · Maintained on simvastatin and Lovaza  · Continue    Gout  Assessment & Plan  · Maintained on Allopurinol    Left arm swellingresolved as of 2/12/2020  Assessment & Plan  · Left arm swelling noted 2/11, new, no pain, erythema or rash   · Venous duplex negative   · Resolved          VTE Pharmacologic Prophylaxis:   Pharmacologic: Apixaban (Eliquis)  Mechanical VTE Prophylaxis in Place: Yes    Patient Centered Rounds: I have performed bedside rounds with nursing staff today  Discussions with Specialists or Other Care Team Provider: Provider notes reviewed  Discussed with Podiatry  Education and Discussions with Family / Patient: Plan of care with patient  Time Spent for Care: 30 minutes  More than 50% of total time spent on counseling and coordination of care as described above  Current Length of Stay: 6 day(s)    Current Patient Status: Inpatient   Certification Statement: The patient will continue to require additional inpatient hospital stay due to to be discharged today  Discharge Plan: Podiatry planning on discharging patient today  He is cleared from Internal Medicine standpoint for discharge  Code Status: Level 1 - Full Code      Subjective:   Patient feels well  He denies pain  He denies shortness of breath  Tolerating his diet  Objective:     Vitals:   Temp (24hrs), Av 6 °F (36 4 °C), Min:97 5 °F (36 4 °C), Max:97 7 °F (36 5 °C)    Temp:  [97 5 °F (36 4 °C)-97 7 °F (36 5 °C)] 97 5 °F (36 4 °C)  HR:  [62-66] 65  Resp:  [17-18] 17  BP: ()/(63-80) 142/80  SpO2:  [93 %-100 %] 97 %  Body mass index is 30 3 kg/m²  Input and Output Summary (last 24 hours):     No intake or output data in the 24 hours ending 20 1112    Physical Exam:     Physical Exam   Constitutional: He is oriented to person, place, and time  He appears well-developed and well-nourished  No distress  HENT:   Head: Normocephalic and atraumatic  Eyes: Conjunctivae are normal  No scleral icterus  Cardiovascular: Normal rate and regular rhythm  Murmur heard  Pulmonary/Chest: Effort normal and breath sounds normal  No respiratory distress  He has no wheezes  He has no rales  Abdominal: Soft  Bowel sounds are normal  He exhibits no distension  There is no tenderness  Musculoskeletal: Normal range of motion  He exhibits no edema or tenderness  Neurological: He is alert and oriented to person, place, and time  Skin: Skin is warm and dry  He is not diaphoretic  Dressing intact on left foot  Psychiatric: He has a normal mood and affect  His behavior is normal    Nursing note and vitals reviewed  Additional Data:     Labs:    Results from last 7 days   Lab Units 02/13/20  0447 02/12/20  0629 02/11/20  0945   WBC Thousand/uL  --  9 03 11 10*   HEMOGLOBIN g/dL 7 7* 7 9* 8 5*   HEMATOCRIT % 24 9* 25 2* 27 5*   PLATELETS Thousands/uL  --  270 299   NEUTROS PCT %  --   --  71   LYMPHS PCT %  --   --  8*   MONOS PCT %  --   --  8   EOS PCT %  --   --  11*     Results from last 7 days   Lab Units 02/13/20  0447  02/07/20  1715   SODIUM mmol/L 138   < > 134*   POTASSIUM mmol/L 4 7   < > 5 3   CHLORIDE mmol/L 107   < > 102   CO2 mmol/L 23   < > 23   BUN mg/dL 37*   < > 72*   CREATININE mg/dL 1 52*   < > 2 05*   ANION GAP mmol/L 8   < > 9   CALCIUM mg/dL 8 5   < > 8 6   ALBUMIN g/dL  --   --  2 3*   TOTAL BILIRUBIN mg/dL  --   --  0 33   ALK PHOS U/L  --   --  88   ALT U/L  --   --  8*   AST U/L  --   --  14   GLUCOSE RANDOM mg/dL 91   < > 101    < > = values in this interval not displayed  Results from last 7 days   Lab Units 02/13/20  0713 02/12/20  2124 02/12/20  1643 02/12/20  1125 02/12/20  0728 02/11/20  2102 02/11/20  1631 02/11/20  1146 02/11/20  0753 02/10/20  2107 02/10/20  1848 02/10/20  1706   POC GLUCOSE mg/dl 109 106 138 150* 98 157* 181* 158* 90 97 103 110                   * I Have Reviewed All Lab Data Listed Above  * Additional Pertinent Lab Tests Reviewed:  [facility] Admission Reviewed    Imaging:    Imaging Reports Reviewed Today Include: None  Imaging Personally Reviewed by Myself Includes:  None    Recent Cultures (last 7 days):     Results from last 7 days   Lab Units 02/10/20  1812 02/10/20  1804   GRAM STAIN RESULT  No Polys or Bacteria seen No Polys or Bacteria seen   WOUND CULTURE   --  Culture results to follow  Last 24 Hours Medication List:     Current Facility-Administered Medications:  acetaminophen 650 mg Oral Q6H PRN Taylor Course, DPM   allopurinol 300 mg Oral QAM Taylor Course, DPM   amLODIPine 10 mg Oral Daily Taylor Course, DPM   apixaban 5 mg Oral BID India Chloe, DPM   doxycycline hyclate 100 mg Oral Q12H [facility] Bee Oden MD   ferrous gluconate 324 mg Oral BID AC Taylor Course, DPM   fish oil 1,000 mg Oral BID Taylor Course, DPM   furosemide 40 mg Oral BID (diuretic) Eliana Ramirez, DO   insulin lispro 1-6 Units Subcutaneous TID AC Azalea Hinds, DPM   insulin lispro 1-6 Units Subcutaneous HS Taylor Course, DPM   LORazepam 0 5 mg Oral Q8H PRN Taylor Course, DPM   [START ON 2/14/2020] losartan 25 mg Oral Daily Eladio Abdi, DO   metoprolol tartrate 50 mg Oral Q12H [facility] Taylor Course, DPM   multivitamin-minerals 1 tablet Oral Daily Taylor Course, DPM   oxyCODONE 5 mg Oral Q6H PRN Taylor Course, DPM   pravastatin 20 mg Oral Daily With Dinner Taylor Course, DPM   tamsulosin 0 4 mg Oral Daily With [address] [facility], DPM        Today, Patient Was Seen By: PAT Dudley    ** Please Note: Dictation voice to text software may have been used in the creation of this document   **

## 2020-02-13 NOTE — ASSESSMENT & PLAN NOTE
Lab Results   Component Value Date    HGBA1C 6 6 02/03/2020       Recent Labs     02/12/20  1125 02/12/20  1643 02/12/20  2124 02/13/20  0713   POCGLU 150* 138 106 109       Blood Sugar Average: Last 72 hrs:  (P) 123 [id number]   · Well controlled per last A1c   · Maintained on Xultophy (combination drug with basal insulin) 19 units daily, Metformin 500 mg BID  · Monitor blood sugars at home  Follow up with PCP  Restart basal insulin when blood sugars are increasing

## 2020-02-13 NOTE — ASSESSMENT & PLAN NOTE
· Left arm swelling noted 2/11, new, no pain, erythema or rash   · Venous duplex negative   · Resolved

## 2020-02-13 NOTE — PROGRESS NOTES
NEPHROLOGY PROGRESS NOTE   Verlyn Boas 75 y o  male MRN: [id number]  Unit/Bed#: [place] [address] -01 Encounter: [id number]      ASSESSMENT & PLAN    Essential (primary) hypertension  - blood pressures at home are now in the 646-501 systolic range  - given his age and comorbidities his goal blood pressure should be 125 to 135 over 70-80  - he is currently on amlodipine 10 mg daily, metoprolol 50 mg twice daily, losartan was decreased to 25 mg daily and now on hold and chlorthalidone was discontinued in the past  -blood pressures are in the 851-500 systolic range  -some signs of mild volume as well in the lower extremity  -continue furosemide 40 mg p o  B i d , metoprolol 50 mg every 12 hours,  amlodipine 10 mg daily, placed him on a low-potassium diet and will restart losartan at 25 mg S tomorrow  -will send with a repeat BMP in 1 week     Persistent proteinuria  - in the setting of prolonged diabetes  -he does have nephrotic range proteinuria serologic workup was negative in the past     Nephropathy  -serologies negative will monitor at this point     Gout  - on allopurinol 300 mg daily  - has been gout free, will monitor and continue     Bilateral leg edema  -improved and stable     Left renal artery stenosis  -renal artery Doppler showed 60% stenosis  -can be indication and cause of flash pulmonary edema  -mitral valve disease also present     Left foot osteomyelitis, peripheral vascular disease  -continue podiatric care    Okay for discharge from a renal standpoint will schedule outpatient follow-up post discharge and repeat BMP in 1 week    SUBJECTIVE:    Patient was seen today overall stable feels well good urine output no chest pain shortness of breath fevers chills nausea vomiting diarrhea constipation    OBJECTIVE:  Current Weight: Weight - Scale: 95 8 kg (211 lb 3 2 oz)  Vitals:    02/13/20 0816   BP: 142/80   Pulse:    Resp:    Temp:    SpO2:      No intake or output data in the 24 hours ending 02/13/20 1128  Weight (last 2 days)     Date/Time   Weight    02/13/20 0600   95 8 (211 2)    02/11/20 0600   98 3 (216 71)              General: conscious, cooperative, in not acute distress  Eyes: conjunctivae pink, anicteric sclerae  ENT: lips and mucous membranes moist  Neck: supple, no JVD  Chest: clear breath sounds bilateral, no crackles, ronchus or wheezings  CVS: distinct S1 & S2, normal rate, regular rhythm  Abdomen: soft, non-tender, non-distended, normoactive bowel sounds  Extremities:  Left foot boot with dressing in place  Neuro: awake, alert, oriented      Medications:    Current Facility-Administered Medications:     acetaminophen (TYLENOL) tablet 650 mg, 650 mg, Oral, Q6H PRN, Alfredo Hash, DPM    allopurinol (ZYLOPRIM) tablet 300 mg, 300 mg, Oral, QAM, Azalea Hinds, DPM, 300 mg at 02/13/20 0813    amLODIPine (NORVASC) tablet 10 mg, 10 mg, Oral, Daily, Alfredo Hash, DPM, 10 mg at 02/13/20 0816    apixaban (ELIQUIS) tablet 5 mg, 5 mg, Oral, BID, Sheralyn Magdi, DPM, 5 mg at 02/13/20 0813    doxycycline hyclate (VIBRAMYCIN) capsule 100 mg, 100 mg, Oral, Q12H [address], Cat Silva MD, 100 mg at 02/13/20 0813    ferrous gluconate (FERGON) tablet 324 mg, 324 mg, Oral, BID AC, Azalea Hinds, DPM, 324 mg at 02/13/20 0617    fish oil capsule 1,000 mg, 1,000 mg, Oral, BID, Alfredo Hash, DPM, 1,000 mg at 02/13/20 0813    furosemide (LASIX) tablet 40 mg, 40 mg, Oral, BID (diuretic), Eladio Patton, DO, 40 mg at 02/13/20 0816    insulin lispro (HumaLOG) 100 units/mL subcutaneous injection 1-6 Units, 1-6 Units, Subcutaneous, TID AC, 1 Units at 02/12/20 1212 **AND** Fingerstick Glucose (POCT), , , TID AC, Azalea Hinds, DPM    insulin lispro (HumaLOG) 100 units/mL subcutaneous injection 1-6 Units, 1-6 Units, Subcutaneous, HS, Azalea Hinds, DPM, 1 Units at 02/11/20 2107    LORazepam (ATIVAN) tablet 0 5 mg, 0 5 mg, Oral, Q8H PRN, Alfredo Hash, DPM, 0 5 mg at 02/09/20 2121    [START ON 2/14/2020] losartan (COZAAR) tablet 25 mg, 25 mg, Oral, Daily, Rashid Shine,     metoprolol tartrate (LOPRESSOR) tablet 50 mg, 50 mg, Oral, Q12H [address], Azalea Hinds, DPM, 50 mg at 02/13/20 0816    multivitamin-minerals (CENTRUM) tablet 1 tablet, 1 tablet, Oral, Daily, Sharon Bowers, DPM, 1 tablet at 02/13/20 0813    oxyCODONE (ROXICODONE) IR tablet 5 mg, 5 mg, Oral, Q6H PRN, Sharon Winnfield, DPM, 5 mg at 02/07/20 1824    pravastatin (PRAVACHOL) tablet 20 mg, 20 mg, Oral, Daily With Dinner, Sharon Bowers, DPM, 20 mg at 02/12/20 1721    tamsulosin (FLOMAX) capsule 0 4 mg, 0 4 mg, Oral, Daily With Chandler Madyson, DPM, 0 4 mg at 02/12/20 1721    Invasive Devices:      Lab Results:   Results from last 7 days   Lab Units 02/13/20  0447 02/12/20  0629 02/11/20  0945 02/10/20  0502 02/09/20  0528 02/08/20  1439 02/08/20  0523 02/07/20  1715   WBC Thousand/uL  --  9 03 11 10* 8 60 9 88  --  9 29 12 38*   HEMOGLOBIN g/dL 7 7* 7 9* 8 5* 7 9* 7 8*  --  7 5* 8 3*   HEMATOCRIT % 24 9* 25 2* 27 5* 25 6* 25 2*  --  24 6* 26 6*   PLATELETS Thousands/uL  --  270 299 284 297  --  292 325   POTASSIUM mmol/L 4 7 5 1 4 7 4 9 5 1  --  5 0 5 3   CHLORIDE mmol/L 107 106 106 105 105  --  106 102   CO2 mmol/L 23 23 19* 22 22  --  22 23   BUN mg/dL 37* 39* 41* 49* 57*  --  66* 72*   CREATININE mg/dL 1 52* 1 65* 1 63* 1 86* 1 93*  --  2 02* 2 05*   CALCIUM mg/dL 8 5 8 7 8 1* 8 3 8 7  --  8 2* 8 6   ALK PHOS U/L  --   --   --   --   --   --   --  88   ALT U/L  --   --   --   --   --   --   --  8*   AST U/L  --   --   --   --   --   --   --  14   LEUKOCYTES UA   --   --   --   --   --  Negative  --   --    BLOOD UA   --   --   --   --   --  Moderate*  --   --        Previous work up:  Please see previous notes

## 2020-02-13 NOTE — ASSESSMENT & PLAN NOTE
· Maintained on amlodipine, metoprolol, and losartan  · Hold losartan for now with recent ELZBIETA  · Nephrology followed  · Resume all home anti-hypertensives

## 2020-02-13 NOTE — ASSESSMENT & PLAN NOTE
· Podiatry primary team  · With recent hospitalization, S/P left hallux amputation (DOS: 1/8/20) and S/P left foot I&D with removal of sesamoids (DOS: 1/12/20) and now with wound dehiscence  · s/p left partial 1st ray amputation 2/10, POD #3  · Had MSSA bacteremia, treated until just prior to admission with IV Ancef  PICC in place right arm, discontinue prior to discharge  Repeat blood cultures to be performed after 2/20 to ensure eradication    · ID consulted, transitioned to po doxycyline to complete 7 days course post operatively  · Restarted Eliquis per podiatry

## 2020-02-13 NOTE — PROGRESS NOTES
Progress Note - Infectious Disease   Pedro Gilbert 75 y o  male MRN: [id number]  Unit/Bed#: [place] [address] -01 Encounter: [id number]      Impression/Recommendations:  1  Left foot wound infection   Outpatient wound culture with growth of MDR E coli   I suspect this is super infection in a patient with open wounds on long-term IV cefazolin, which probably selected out this MDR pathogen   Patient is clinically and systemically well, without evidence of sepsis or systemic toxicity   Patient now status post ray amputation  Continue p o  doxycycline  Serial foot exams  Treat x7 days postop      2  Left 1st MT head osteomyelitis, evident on x-ray   This is most likely secondary to super infection above   Patient is now status post left partial 1st ray amputation, for surgical cure  No further antibiotic needed for this  Wound care per Podiatry      3  CKD stage 3  Creatinine baseline  Doxycycline does not need dose adjustment  Monitor creatinine      4  DM with neuropathy   Hemoglobin A1c is mildly elevated but can be better controlled      Discussed with patient in detail regarding the above plan  Okay for discharge from ID viewpoint      Antibiotics:  Doxycycline  POD # 3     Subjective:  Patient feels well  Minimal foot pain  Temperature stays down   No chills  He is tolerating antibiotic well   No nausea, vomiting or diarrhea  Objective:  Vitals:  Temp:  [97 5 °F (36 4 °C)-97 7 °F (36 5 °C)] 97 5 °F (36 4 °C)  HR:  [62-66] 65  Resp:  [17-18] 17  BP: ()/(63-80) 142/80  SpO2:  [93 %-100 %] 97 %  Temp (24hrs), Av 6 °F (36 4 °C), Min:97 5 °F (36 4 °C), Max:97 7 °F (36 5 °C)  Current: Temperature: 97 5 °F (36 4 °C)    Physical Exam:     General: Awake, alert, cooperative, no distress  Neck:  Supple  No mass  No lymphadenopathy  Lungs: Expansion symmetric, no rales, no wheezing, respirations unlabored  Heart:  Regular rate and rhythm, S1 and S2 normal, no murmur     Abdomen: Soft, nondistended, non-tender, bowel sounds active all four quadrants, no masses, no organomegaly  Extremities: No edema  Left foot with dressing in place  Dressing is dry  No erythema beyond dressing  Nontender  Skin:  No rash  Neuro: Moves all extremities  Invasive Devices     Peripherally Inserted Central Catheter Line            PICC Line 49/80/67 Right Basilic 30 days                Labs studies:   I have personally reviewed pertinent labs  Results from last 7 days   Lab Units 02/13/20  0447 02/12/20  0629 02/11/20  0945  02/07/20  1715   POTASSIUM mmol/L 4 7 5 1 4 7   < > 5 3   CHLORIDE mmol/L 107 106 106   < > 102   CO2 mmol/L 23 23 19*   < > 23   BUN mg/dL 37* 39* 41*   < > 72*   CREATININE mg/dL 1 52* 1 65* 1 63*   < > 2 05*   EGFR ml/min/1 73sq m 47 43 43   < > 33   CALCIUM mg/dL 8 5 8 7 8 1*   < > 8 6   AST U/L  --   --   --   --  14   ALT U/L  --   --   --   --  8*   ALK PHOS U/L  --   --   --   --  88    < > = values in this interval not displayed  Results from last 7 days   Lab Units 02/13/20  0447 02/12/20  0629 02/11/20  0945 02/10/20  0502   WBC Thousand/uL  --  9 03 11 10* 8 60   HEMOGLOBIN g/dL 7 7* 7 9* 8 5* 7 9*   PLATELETS Thousands/uL  --  270 299 284     Results from last 7 days   Lab Units 02/10/20  1812 02/10/20  1804   GRAM STAIN RESULT  No Polys or Bacteria seen No Polys or Bacteria seen   WOUND CULTURE   --  Culture results to follow  Imaging Studies:   I have personally reviewed pertinent imaging study reports and images in PACS  EKG, Pathology, and Other Studies:   I have personally reviewed pertinent reports

## 2020-02-13 NOTE — PLAN OF CARE
Problem: Potential for Falls  Goal: Patient will remain free of falls  Description  INTERVENTIONS:  - Assess patient frequently for physical needs  -  Identify cognitive and physical deficits and behaviors that affect risk of falls    -  [place] fall precautions as indicated by assessment   - Educate patient/family on patient safety including physical limitations  - Instruct patient to call for assistance with activity based on assessment  - Modify environment to reduce risk of injury  - Consider OT/PT consult to assist with strengthening/mobility  Outcome: Progressing     Problem: INFECTION - ADULT  Goal: Absence or prevention of progression during hospitalization  Description  INTERVENTIONS:  - Assess and monitor for signs and symptoms of infection  - Monitor lab/diagnostic results  - Monitor all insertion sites, i e  indwelling lines, tubes, and drains  - Monitor endotracheal if appropriate and nasal secretions for changes in amount and color  - [place] appropriate cooling/warming therapies per order  - Administer medications as ordered  - Instruct and encourage patient and family to use good hand hygiene technique  - Identify and instruct in appropriate isolation precautions for identified infection/condition  Outcome: Progressing     Problem: SAFETY ADULT  Goal: Maintain or return to baseline ADL function  Description  INTERVENTIONS:  -  Assess patient's ability to carry out ADLs; assess patient's baseline for ADL function and identify physical deficits which impact ability to perform ADLs (bathing, care of mouth/teeth, toileting, grooming, dressing, etc )  - Assess/evaluate cause of self-care deficits   - Assess range of motion  - Assess patient's mobility; develop plan if impaired  - Assess patient's need for assistive devices and provide as appropriate  - Encourage maximum independence but intervene and supervise when necessary  - Involve family in performance of ADLs  - Assess for home care needs following discharge   - Consider OT consult to assist with ADL evaluation and planning for discharge  - Provide patient education as appropriate  Outcome: Progressing  Goal: Maintain or return mobility status to optimal level  Description  INTERVENTIONS:  - Assess patient's baseline mobility status (ambulation, transfers, stairs, etc )    - Identify cognitive and physical deficits and behaviors that affect mobility  - Identify mobility aids required to assist with transfers and/or ambulation (gait belt, sit-to-stand, lift, walker, cane, etc )  - [place] fall precautions as indicated by assessment  - Record patient progress and toleration of activity level on Mobility SBAR; progress patient to next Phase/Stage  - Instruct patient to call for assistance with activity based on assessment  - Consider rehabilitation consult to assist with strengthening/weightbearing, etc   Outcome: Progressing     Problem: DISCHARGE PLANNING  Goal: Discharge to home or other facility with appropriate resources  Description  INTERVENTIONS:  - Identify barriers to discharge w/patient and caregiver  - Arrange for needed discharge resources and transportation as appropriate  - Identify discharge learning needs (meds, wound care, etc )  - Arrange for interpretive services to assist at discharge as needed  - Refer to Case Management Department for coordinating discharge planning if the patient needs post-hospital services based on physician/advanced practitioner order or complex needs related to functional status, cognitive ability, or social support system  Outcome: Progressing     Problem: SKIN/TISSUE INTEGRITY - ADULT  Goal: Skin integrity remains intact  Description  INTERVENTIONS  - Identify patients at risk for skin breakdown  - Assess and monitor skin integrity  - Assess and monitor nutrition and hydration status  - Monitor labs (i e  albumin)  - Assess for incontinence   - Turn and reposition patient  - Assist with mobility/ambulation  - Relieve pressure over bony prominences  - Avoid friction and shearing  - Provide appropriate hygiene as needed including keeping skin clean and dry  - Evaluate need for skin moisturizer/barrier cream  - Collaborate with interdisciplinary team (i e  Nutrition, Rehabilitation, etc )   - Patient/family teaching  Outcome: Progressing  Goal: Incision(s), wounds(s) or drain site(s) healing without S/S of infection  Description  INTERVENTIONS  - Assess and document risk factors for skin impairment   - Assess and document dressing, incision, wound bed, drain sites and surrounding tissue  - Consider nutrition services referral as needed  - Oral mucous membranes remain intact  - Provide patient/ family education  Outcome: Progressing  Goal: Oral mucous membranes remain intact  Description  INTERVENTIONS  - Assess oral mucosa and hygiene practices  - Implement preventative oral hygiene regimen  - Implement oral medicated treatments as ordered  - Initiate Nutrition services referral as needed  Outcome: Progressing     Problem: Prexisting or High Potential for Compromised Skin Integrity  Goal: Skin integrity is maintained or improved  Description  INTERVENTIONS:  - Identify patients at risk for skin breakdown  - Assess and monitor skin integrity  - Assess and monitor nutrition and hydration status  - Monitor labs   - Assess for incontinence   - Turn and reposition patient  - Assist with mobility/ambulation  - Relieve pressure over bony prominences  - Avoid friction and shearing  - Provide appropriate hygiene as needed including keeping skin clean and dry  - Evaluate need for skin moisturizer/barrier cream  - Collaborate with interdisciplinary team   - Patient/family teaching  - Consider wound care consult   Outcome: Progressing

## 2020-02-13 NOTE — PLAN OF CARE
Problem: Potential for Falls  Goal: Patient will remain free of falls  Description  INTERVENTIONS:  - Assess patient frequently for physical needs  -  Identify cognitive and physical deficits and behaviors that affect risk of falls    -  [place] fall precautions as indicated by assessment   - Educate patient/family on patient safety including physical limitations  - Instruct patient to call for assistance with activity based on assessment  - Modify environment to reduce risk of injury  - Consider OT/PT consult to assist with strengthening/mobility  2/13/2020 1215 by Nona Coombs RN  Outcome: Adequate for Discharge  2/13/2020 0948 by Nona Coombs RN  Outcome: Progressing     Problem: INFECTION - ADULT  Goal: Absence or prevention of progression during hospitalization  Description  INTERVENTIONS:  - Assess and monitor for signs and symptoms of infection  - Monitor lab/diagnostic results  - Monitor all insertion sites, i e  indwelling lines, tubes, and drains  - Monitor endotracheal if appropriate and nasal secretions for changes in amount and color  - [place] appropriate cooling/warming therapies per order  - Administer medications as ordered  - Instruct and encourage patient and family to use good hand hygiene technique  - Identify and instruct in appropriate isolation precautions for identified infection/condition  2/13/2020 1215 by Nona Coombs RN  Outcome: Adequate for Discharge  2/13/2020 0948 by Nona Coombs RN  Outcome: Progressing     Problem: SAFETY ADULT  Goal: Maintain or return to baseline ADL function  Description  INTERVENTIONS:  -  Assess patient's ability to carry out ADLs; assess patient's baseline for ADL function and identify physical deficits which impact ability to perform ADLs (bathing, care of mouth/teeth, toileting, grooming, dressing, etc )  - Assess/evaluate cause of self-care deficits   - Assess range of motion  - Assess patient's mobility; develop plan if impaired  - Assess patient's need for assistive devices and provide as appropriate  - Encourage maximum independence but intervene and supervise when necessary  - Involve family in performance of ADLs  - Assess for home care needs following discharge   - Consider OT consult to assist with ADL evaluation and planning for discharge  - Provide patient education as appropriate  2/13/2020 1215 by Yodit Myers RN  Outcome: Adequate for Discharge  2/13/2020 0948 by Yodit Myers RN  Outcome: Progressing  Goal: Maintain or return mobility status to optimal level  Description  INTERVENTIONS:  - Assess patient's baseline mobility status (ambulation, transfers, stairs, etc )    - Identify cognitive and physical deficits and behaviors that affect mobility  - Identify mobility aids required to assist with transfers and/or ambulation (gait belt, sit-to-stand, lift, walker, cane, etc )  - [place] fall precautions as indicated by assessment  - Record patient progress and toleration of activity level on Mobility SBAR; progress patient to next Phase/Stage  - Instruct patient to call for assistance with activity based on assessment  - Consider rehabilitation consult to assist with strengthening/weightbearing, etc   2/13/2020 1215 by Yodit Myers RN  Outcome: Adequate for Discharge  2/13/2020 0948 by Yodit Myers RN  Outcome: Progressing     Problem: DISCHARGE PLANNING  Goal: Discharge to home or other facility with appropriate resources  Description  INTERVENTIONS:  - Identify barriers to discharge w/patient and caregiver  - Arrange for needed discharge resources and transportation as appropriate  - Identify discharge learning needs (meds, wound care, etc )  - Arrange for interpretive services to assist at discharge as needed  - Refer to Case Management Department for coordinating discharge planning if the patient needs post-hospital services based on physician/advanced practitioner order or complex needs related to functional status, cognitive ability, or social support system  2/13/2020 1215 by Migdalia Reza RN  Outcome: Adequate for Discharge  2/13/2020 0948 by Migdalia Reza RN  Outcome: Progressing     Problem: SKIN/TISSUE INTEGRITY - ADULT  Goal: Skin integrity remains intact  Description  INTERVENTIONS  - Identify patients at risk for skin breakdown  - Assess and monitor skin integrity  - Assess and monitor nutrition and hydration status  - Monitor labs (i e  albumin)  - Assess for incontinence   - Turn and reposition patient  - Assist with mobility/ambulation  - Relieve pressure over bony prominences  - Avoid friction and shearing  - Provide appropriate hygiene as needed including keeping skin clean and dry  - Evaluate need for skin moisturizer/barrier cream  - Collaborate with interdisciplinary team (i e  Nutrition, Rehabilitation, etc )   - Patient/family teaching  2/13/2020 1215 by Migdalia Reza RN  Outcome: Adequate for Discharge  2/13/2020 0948 by Migdalia Reza RN  Outcome: Progressing  Goal: Incision(s), wounds(s) or drain site(s) healing without S/S of infection  Description  INTERVENTIONS  - Assess and document risk factors for skin impairment   - Assess and document dressing, incision, wound bed, drain sites and surrounding tissue  - Consider nutrition services referral as needed  - Oral mucous membranes remain intact  - Provide patient/ family education  2/13/2020 1215 by Migdalia Reza RN  Outcome: Adequate for Discharge  2/13/2020 0948 by Migdalia Reza RN  Outcome: Progressing  Goal: Oral mucous membranes remain intact  Description  INTERVENTIONS  - Assess oral mucosa and hygiene practices  - Implement preventative oral hygiene regimen  - Implement oral medicated treatments as ordered  - Initiate Nutrition services referral as needed  2/13/2020 1215 by Migdalia Reza RN  Outcome: Adequate for Discharge  2/13/2020 0948 by Migdalia Reza RN  Outcome: Progressing     Problem: Prexisting or High Potential for Compromised Skin Integrity  Goal: Skin integrity is maintained or improved  Description  INTERVENTIONS:  - Identify patients at risk for skin breakdown  - Assess and monitor skin integrity  - Assess and monitor nutrition and hydration status  - Monitor labs   - Assess for incontinence   - Turn and reposition patient  - Assist with mobility/ambulation  - Relieve pressure over bony prominences  - Avoid friction and shearing  - Provide appropriate hygiene as needed including keeping skin clean and dry  - Evaluate need for skin moisturizer/barrier cream  - Collaborate with interdisciplinary team   - Patient/family teaching  - Consider wound care consult   2/13/2020 1215 by Kelly Sanders RN  Outcome: Adequate for Discharge  2/13/2020 0948 by Kelly Sanders, RN  Outcome: Progressing

## 2020-02-13 NOTE — DISCHARGE SUMMARY
Discharge Summary -   Jose Curiel 75 y o  male MRN: [id number]  Unit/Bed#: [place] [facility] 87 213-01 Encounter: [id number]    Admission Date: 2/7/2020     Admitting Diagnosis: Osteomyelitis of foot, left, acute [facility]) [I75 [id number]]    HPI: Jose Curiel is a 57-year-old male with a past medical history DM type 2 with neuropathy, respiratory failure, heart block, PAF, CHF, CKD, and history of Staph aureus bacteremia that presented for direct admission today under Dr Anival Gutiérrez for left surgical wound dehiscence with suspected underlying OM  He underwent a left hallux amputation on 01/08/2020 with   [facility]  He subsequently underwent a left foot I&D with removal sesamoid on 01/12/2020 with   [facility] as well  He has been following up as an outpatient and was last seen by Dr Anival Gutiérrez earlier today  He reported to wound that has not healed since the surgery that both probe to bone  Outpatient x-rays revealed cortical irregularities along the remaining 1st metatarsal head  He was sent for direct admission to get further workup for osteomyelitis, and possible surgical intervention  He was supposed to get his PICC line out today due to his history of bacteremia, but due to the recent admission it was left in  He denied any recent nausea, vomiting, fever, chills, shortness of breath, chest pains  Procedures Performed: PARTIAL 1ST RAY AMPUTATION:  (LEFT)    Hospital Course:  Patient was admitted to the podiatry service under Dr Anival Gutiérrez on 02/07/2020  Outpatient x-rays were reviewed which showed osteomyelitis of the left 1st metatarsal head  Internal Medicine, Infectious Disease, and Cardiology was consulted during hospitalization  After Internal Medicine, and Cardiology gave preoperative clearance, patient was taken to the operating room on 02/10/2020 for left partial 1st ray amputation  Bone biopsies were sent to pathology intraoperatively  Postoperatively, all anticoagulation was held  Patient was placed on p o  doxycycline per Infectious Disease recommendations postoperatively  Per ID recommendations, patient will complete a 7 day postop course of p o  doxycycline  Patient was instructed to be strict nonweightbearing on the left lower extremity  Patient was deemed stable for discharge on 02/13/2020 from all treatment teams  A prescription for an additional 4 days of p o  doxycycline was sent to his pharmacy prior to discharge  Patient will follow up with Dr Azalia Wray on an outpatient basis for postoperative follow-up  Bone biopsies will be followed up as an outpatient  Significant Findings, Care, Treatment and Services Provided:  As stated above    Complications:  None    Discharge Diagnosis:  Status post left partial 1st ray amputation    Condition at Discharge: good     Discharge instructions/Information to patient and family:   See after visit summary for information provided to patient and family  Provisions for Follow-Up Care/Important appointments:    See after visit summary for information related to follow-up care and any pertinent home health orders  Disposition: Home    Planned Readmission: No    Discharge Statement   I spent 30 minutes discharging the patient  This time was spent on the day of discharge  I had direct contact with the patient on the day of discharge  The details of this patient's discharge     Discharge Medications:  See after visit summary for reconciled discharge medications provided to patient and family

## 2020-02-13 NOTE — PROGRESS NOTES
Progress Note - Podiatry  Satya Turpin 75 y o  male MRN: [id number]  Unit/Bed#: [address] 213-01 Encounter: [id number]    Assessment:  1  Left foot surgical wound dehiscence with suspected underlying OM  - S/P left hallux amputation (DOS: 1/8/20)  - S/P left foot I&D with removal of sesamoids (DOS: 1/12/20)  - S/P left partial 1st ray amputation (DOS:2/10/20)  2  DM type 2 with neuropathy  3  Acute respiratory failure  4  Complete heart block - in situ permanent pacemaker  5  PAF  6  Acute diastolic congestive heart failure  7  CKD stage 3  8  Hx of Staph aureus bacteremia    Plan:  · Postop dressing change today  Incision is healing well  No active drainage on the dressing  Patient is deemed stable for discharge from Podiatry standpoint  Will likely discharge patient later today once patient is cleared from all treatment teams  · Will continue p o  doxycycline for an additional 4 days to complete a 7 day course postoperatively  · Strict nonweightbearing to the left lower extremity  · Bone bx still pending  Will f/u OP  · Patient will follow up with Dr Lewis Rivera on an outpatient basis for postoperative follow-up  Subjective/Objective   Chief Complaint:  Status post left foot surgery      Subjective: 75 y o  y/o male was seen and evaluated at bedside in no acute distress, nontoxic in appearance  Patient denies any recent nausea, vomiting, fever, chills, shortness of breath, chest pains  Blood pressure 142/80, pulse 65, temperature 97 5 °F (36 4 °C), resp  rate 17, height 5' 10" (1 778 m), weight 95 8 kg (211 lb 3 2 oz), SpO2 97 %  ,Body mass index is 30 3 kg/m²  Invasive Devices     Peripherally Inserted Central Catheter Line            PICC Line 67/82/11 Right Basilic 30 days                Physical Exam:   General: Alert, cooperative and no distress    Lower Extremity Exam:     NVS at baseline B/l  MSK function at baseline B/l  No calf tenderness noted B/l      LLE:  Dressing was c/d/i with no strike through to the outer dressing noted  Surgical incision is well coapted with sutures intact  Mild periwound erythema noted  No active drainage  Capillary refill time to the surgical incision is brisk  No signs of wound dehiscence  Lab, Imaging and other studies:   I have personally reviewed pertinent lab results  , CBC:   Lab Results   Component Value Date    HGB 7 7 (L) 02/13/2020    HCT 24 9 (L) 02/13/2020   , CMP:   Lab Results   Component Value Date    SODIUM 138 02/13/2020    K 4 7 02/13/2020     02/13/2020    CO2 23 02/13/2020    BUN 37 (H) 02/13/2020    CREATININE 1 52 (H) 02/13/2020    CALCIUM 8 5 02/13/2020    EGFR 47 02/13/2020       Imaging: I have personally reviewed pertinent films in PACS  EKG, Pathology, and Other Studies: I have personally reviewed pertinent reports  Portions of the record may have been created with voice recognition software  Occasional wrong word or "sound a like" substitutions may have occurred due to the inherent limitations of voice recognition software  Read the chart carefully and recognize, using context, where substitutions have occurred

## 2020-02-13 NOTE — ASSESSMENT & PLAN NOTE
· Baseline variable, most recent 1 9-2 since last admission  · Nephrology follow up  · Losartan restarted    Lab Results   Component Value Date    CREATININE 1 52 (H) 02/13/2020    CREATININE 1 65 (H) 02/12/2020    CREATININE 1 63 (H) 02/11/2020

## 2020-02-14 ENCOUNTER — PATIENT OUTREACH (OUTPATIENT)
Dept: FAMILY MEDICINE CLINIC | Facility: CLINIC | Age: 65
End: 2020-02-14

## 2020-02-14 ENCOUNTER — TELEPHONE (OUTPATIENT)
Dept: NEPHROLOGY | Facility: CLINIC | Age: 65
End: 2020-02-14

## 2020-02-14 ENCOUNTER — TRANSITIONAL CARE MANAGEMENT (OUTPATIENT)
Dept: FAMILY MEDICINE CLINIC | Facility: CLINIC | Age: 65
End: 2020-02-14

## 2020-02-14 DIAGNOSIS — N18.30 STAGE 3 CHRONIC KIDNEY DISEASE (HCC): Primary | ICD-10-CM

## 2020-02-14 LAB
BACTERIA TISS AEROBE CULT: ABNORMAL
GRAM STN SPEC: ABNORMAL

## 2020-02-14 NOTE — PROGRESS NOTES
Ava Carmona is managing at home post hospitalization and denies needing additional assistance  He is receiving home care services of SN  He has f/u scheduled with podiatry but has not scheduled f/u with PCP  Encouraged to do so  No transportation issues  Nutritional intake adequate, his FBS today was 114  Non weight bearing to L foot  Dressing dry and intact to L foot  He is ambulatory with walker  He denies pain or swelling to foot  No respiratory difficulties at present  He is taking all medications as prescribed  No issues or concerns at the present time  He declined further outreach

## 2020-02-14 NOTE — TELEPHONE ENCOUNTER
----- Message from [place] Oanh, DO sent at 2/13/2020 11:31 AM EST -----  Please schedule patient for repeat BMP in 1 week he can be scheduled for outpatient follow-up with advanced practitioner in 1 months for blood pressure control

## 2020-02-15 LAB
BACTERIA WND AEROBE CULT: ABNORMAL
GRAM STN SPEC: ABNORMAL

## 2020-02-18 ENCOUNTER — HOSPITAL ENCOUNTER (INPATIENT)
Facility: HOSPITAL | Age: 65
LOS: 2 days | Discharge: HOME HEALTH | DRG: 637 | End: 2020-02-20
Attending: PODIATRIST | Admitting: PODIATRIST
Payer: MEDICARE

## 2020-02-18 DIAGNOSIS — M86.172 OTHER ACUTE OSTEOMYELITIS OF LEFT FOOT (HCC): Primary | ICD-10-CM

## 2020-02-18 DIAGNOSIS — I48.0 PAF (PAROXYSMAL ATRIAL FIBRILLATION) (HCC): ICD-10-CM

## 2020-02-18 DIAGNOSIS — E11.40 TYPE 2 DIABETES MELLITUS WITH DIABETIC NEUROPATHY, WITHOUT LONG-TERM CURRENT USE OF INSULIN (HCC): ICD-10-CM

## 2020-02-18 PROBLEM — M86.9 OSTEOMYELITIS OF LEFT FOOT (HCC): Status: ACTIVE | Noted: 2020-02-07

## 2020-02-18 LAB
ALBUMIN SERPL BCP-MCNC: 2.3 G/DL (ref 3.5–5)
ALP SERPL-CCNC: 91 U/L (ref 46–116)
ALT SERPL W P-5'-P-CCNC: 14 U/L (ref 12–78)
ANION GAP SERPL CALCULATED.3IONS-SCNC: 11 MMOL/L (ref 4–13)
AST SERPL W P-5'-P-CCNC: 29 U/L (ref 5–45)
BASOPHILS # BLD AUTO: 0.08 THOUSANDS/ΜL (ref 0–0.1)
BASOPHILS NFR BLD AUTO: 1 % (ref 0–1)
BILIRUB SERPL-MCNC: 0.36 MG/DL (ref 0.2–1)
BUN SERPL-MCNC: 46 MG/DL (ref 5–25)
CALCIUM SERPL-MCNC: 8.8 MG/DL (ref 8.3–10.1)
CHLORIDE SERPL-SCNC: 105 MMOL/L (ref 100–108)
CO2 SERPL-SCNC: 22 MMOL/L (ref 21–32)
CREAT SERPL-MCNC: 1.75 MG/DL (ref 0.6–1.3)
EOSINOPHIL # BLD AUTO: 1.14 THOUSAND/ΜL (ref 0–0.61)
EOSINOPHIL NFR BLD AUTO: 12 % (ref 0–6)
ERYTHROCYTE [DISTWIDTH] IN BLOOD BY AUTOMATED COUNT: 15.1 % (ref 11.6–15.1)
GFR SERPL CREATININE-BSD FRML MDRD: 40 ML/MIN/1.73SQ M
GLUCOSE SERPL-MCNC: 102 MG/DL (ref 65–140)
GLUCOSE SERPL-MCNC: 94 MG/DL (ref 65–140)
HCT VFR BLD AUTO: 27.7 % (ref 36.5–49.3)
HGB BLD-MCNC: 8.8 G/DL (ref 12–17)
IMM GRANULOCYTES # BLD AUTO: 0.02 THOUSAND/UL (ref 0–0.2)
IMM GRANULOCYTES NFR BLD AUTO: 0 % (ref 0–2)
LYMPHOCYTES # BLD AUTO: 1.21 THOUSANDS/ΜL (ref 0.6–4.47)
LYMPHOCYTES NFR BLD AUTO: 13 % (ref 14–44)
MCH RBC QN AUTO: 29.1 PG (ref 26.8–34.3)
MCHC RBC AUTO-ENTMCNC: 31.8 G/DL (ref 31.4–37.4)
MCV RBC AUTO: 92 FL (ref 82–98)
MONOCYTES # BLD AUTO: 0.68 THOUSAND/ΜL (ref 0.17–1.22)
MONOCYTES NFR BLD AUTO: 7 % (ref 4–12)
NEUTROPHILS # BLD AUTO: 6.48 THOUSANDS/ΜL (ref 1.85–7.62)
NEUTS SEG NFR BLD AUTO: 67 % (ref 43–75)
NRBC BLD AUTO-RTO: 0 /100 WBCS
PLATELET # BLD AUTO: 371 THOUSANDS/UL (ref 149–390)
PMV BLD AUTO: 9.5 FL (ref 8.9–12.7)
POTASSIUM SERPL-SCNC: 4.9 MMOL/L (ref 3.5–5.3)
PROT SERPL-MCNC: 6.7 G/DL (ref 6.4–8.2)
RBC # BLD AUTO: 3.02 MILLION/UL (ref 3.88–5.62)
SODIUM SERPL-SCNC: 138 MMOL/L (ref 136–145)
WBC # BLD AUTO: 9.61 THOUSAND/UL (ref 4.31–10.16)

## 2020-02-18 PROCEDURE — 94660 CPAP INITIATION&MGMT: CPT

## 2020-02-18 PROCEDURE — 80053 COMPREHEN METABOLIC PANEL: CPT | Performed by: PODIATRIST

## 2020-02-18 PROCEDURE — 82948 REAGENT STRIP/BLOOD GLUCOSE: CPT

## 2020-02-18 PROCEDURE — 94760 N-INVAS EAR/PLS OXIMETRY 1: CPT

## 2020-02-18 PROCEDURE — 99223 1ST HOSP IP/OBS HIGH 75: CPT | Performed by: INTERNAL MEDICINE

## 2020-02-18 PROCEDURE — 99222 1ST HOSP IP/OBS MODERATE 55: CPT | Performed by: INTERNAL MEDICINE

## 2020-02-18 PROCEDURE — 85025 COMPLETE CBC W/AUTO DIFF WBC: CPT | Performed by: PODIATRIST

## 2020-02-18 RX ORDER — INSULIN GLARGINE 100 [IU]/ML
19 INJECTION, SOLUTION SUBCUTANEOUS EVERY MORNING
Status: DISCONTINUED | OUTPATIENT
Start: 2020-02-19 | End: 2020-02-18

## 2020-02-18 RX ORDER — METOLAZONE 5 MG/1
5 TABLET ORAL SEE ADMIN INSTRUCTIONS
Status: DISCONTINUED | OUTPATIENT
Start: 2020-02-18 | End: 2020-02-20 | Stop reason: HOSPADM

## 2020-02-18 RX ORDER — PRAVASTATIN SODIUM 40 MG
40 TABLET ORAL
Status: DISCONTINUED | OUTPATIENT
Start: 2020-02-18 | End: 2020-02-20 | Stop reason: HOSPADM

## 2020-02-18 RX ORDER — TAMSULOSIN HYDROCHLORIDE 0.4 MG/1
0.4 CAPSULE ORAL
Status: DISCONTINUED | OUTPATIENT
Start: 2020-02-18 | End: 2020-02-20 | Stop reason: HOSPADM

## 2020-02-18 RX ORDER — LOSARTAN POTASSIUM 25 MG/1
25 TABLET ORAL DAILY
Status: DISCONTINUED | OUTPATIENT
Start: 2020-02-18 | End: 2020-02-20 | Stop reason: HOSPADM

## 2020-02-18 RX ORDER — ALLOPURINOL 100 MG/1
300 TABLET ORAL EVERY MORNING
Status: DISCONTINUED | OUTPATIENT
Start: 2020-02-19 | End: 2020-02-20 | Stop reason: HOSPADM

## 2020-02-18 RX ORDER — METOPROLOL TARTRATE 50 MG/1
50 TABLET, FILM COATED ORAL EVERY 12 HOURS SCHEDULED
Status: DISCONTINUED | OUTPATIENT
Start: 2020-02-18 | End: 2020-02-20 | Stop reason: HOSPADM

## 2020-02-18 RX ORDER — DOXYCYCLINE HYCLATE 50 MG/1
324 CAPSULE, GELATIN COATED ORAL
Status: DISCONTINUED | OUTPATIENT
Start: 2020-02-18 | End: 2020-02-20 | Stop reason: HOSPADM

## 2020-02-18 RX ORDER — ACETAMINOPHEN 325 MG/1
650 TABLET ORAL EVERY 6 HOURS PRN
Status: DISCONTINUED | OUTPATIENT
Start: 2020-02-18 | End: 2020-02-20 | Stop reason: HOSPADM

## 2020-02-18 RX ORDER — FUROSEMIDE 40 MG/1
40 TABLET ORAL 2 TIMES DAILY
Status: DISCONTINUED | OUTPATIENT
Start: 2020-02-19 | End: 2020-02-20 | Stop reason: HOSPADM

## 2020-02-18 RX ORDER — MELATONIN
1000 DAILY
Status: DISCONTINUED | OUTPATIENT
Start: 2020-02-18 | End: 2020-02-18

## 2020-02-18 RX ORDER — INSULIN GLARGINE 100 [IU]/ML
15 INJECTION, SOLUTION SUBCUTANEOUS EVERY MORNING
Status: DISCONTINUED | OUTPATIENT
Start: 2020-02-19 | End: 2020-02-20 | Stop reason: HOSPADM

## 2020-02-18 RX ORDER — CHLORAL HYDRATE 500 MG
1000 CAPSULE ORAL 2 TIMES DAILY
Status: DISCONTINUED | OUTPATIENT
Start: 2020-02-18 | End: 2020-02-18

## 2020-02-18 RX ORDER — FUROSEMIDE 40 MG/1
40 TABLET ORAL 2 TIMES DAILY
Status: DISCONTINUED | OUTPATIENT
Start: 2020-02-18 | End: 2020-02-18

## 2020-02-18 RX ADMIN — CEFEPIME HYDROCHLORIDE 2000 MG: 2 INJECTION, POWDER, FOR SOLUTION INTRAVENOUS at 16:22

## 2020-02-18 RX ADMIN — METFORMIN HYDROCHLORIDE 500 MG: 500 TABLET ORAL at 16:25

## 2020-02-18 RX ADMIN — APIXABAN 5 MG: 5 TABLET, FILM COATED ORAL at 17:33

## 2020-02-18 RX ADMIN — FERROUS GLUCONATE 324 MG: 324 TABLET ORAL at 20:47

## 2020-02-18 RX ADMIN — TAMSULOSIN HYDROCHLORIDE 0.4 MG: 0.4 CAPSULE ORAL at 16:25

## 2020-02-18 RX ADMIN — METOPROLOL TARTRATE 50 MG: 50 TABLET, FILM COATED ORAL at 21:49

## 2020-02-18 RX ADMIN — Medication 1000 MG: at 17:33

## 2020-02-18 NOTE — CONSULTS
Consultation - Infectious Disease   Job Le 75 y o  male MRN: [id number]  Unit/Bed#: E5 -01 Encounter: [id number]      IMPRESSION & RECOMMENDATIONS:   Impression/Recommendations: This is a 75 y o  male, with DM and CKD, was admitted here earlier this month for in DR E coli distal 1st MT osteomyelitis  Patient underwent resection to normal appearing bone  However, pathology now showed evidence of residual osteomyelitis of MT stump  1  Residual osteomyelitis of left 1st MT stump, after distal MT head resection  Bone culture grew MDR E coli  Probability of cure with antibiotics alone for foot osteomyelitis is extremely low  Ideally, patient should undergo further bone resection  However, per discussion with Dr Michelle De Jesus from podiatry, there is concern for loss of stability and ambulation if further metatarsal resection is done  Therefore, we will attempt to treat issues residual osteomyelitis with prolonged IV antibiotic course  If patient fails this treatment course, he will need further bone resection, and would be at risk for foot loss  Patient is clinically and systemically well, without evidence of sepsis or systemic toxicity  He has no fever or leukocytosis  Re-initiate IV cefepime  Reinserted PICC  Treat x at least 4 weeks total, possibly 6 weeks if patient can tolerate  Monitor weekly CBCD/BMP  2  CKD stage 3  Creatinine baseline  Antibiotic at full dose for now  Monitor creatinine  3  DM type 2 with neuropathy  4  Status post left hallux amputation in January, followed by left partial 1st ray amputation in early February  Discussed with patient and his wife in detail regarding the above plan  Discussed with Dr Michelle De Jesus from Podiatry earlier  Thank you for this consultation  We will follow along with you  HISTORY OF PRESENT ILLNESS:  Reason for Consult:  Left foot osteomyelitis      HPI: Job Le is a 75 y o  male, with history of DM and CKD, was initially admitted here in early January with left big toe cellulitis, osteomyelitis and sepsis with MSSA bacteremia  He underwent toe amputation and completed 4 week course of IV cefazolin, through 2/6  He developed superinfection of amputation wound with MDR E coli  There was also evidence of osteomyelitis of 1st MT head  Patient was admitted on 02/07 and underwent resection of 1st MT head  Resection was done to good and hard bone, for presumptive surgical cure  PICC will discontinue and patient was discharged home with p o  doxycycline for 1 week post bone resection  Unfortunately, margin pathology now comes back positive for residual osteomyelitis  Operative culture also grew the same MDR E coli  Therefore, patient was readmitted for treatment of this  Patient feels well  He has no foot pain  Patient states that he had notice increased erythema at the surgical site yesterday  This has resolved  No fever or chills  REVIEW OF SYSTEMS:  A complete system-based review was done  Except for what is noted in HPI above, ROS of systems is otherwise negative      PAST MEDICAL HISTORY:  Past Medical History:   Diagnosis Date    Arthritis     OA    Bruise of both arms     forearms and both hands    Bruises easily     [facility] failure) [facility]     Diabetes [facility]     Diabetic foot ulcer ([facility])     Eczema     Erectile dysfunction     Gout     Hyperlipidemia     Hypertension     Murmur     Nephropathy     Osteoarthritis     PAD (peripheral artery disease) ([facility])     Seasonal allergies     Toe infection     bilat great toes    Vertigo     Walks frequently     Wears dentures     upper    Wears glasses      Past Surgical History:   Procedure Laterality Date    CARDIAC PACEMAKER PLACEMENT      CARPAL TUNNEL RELEASE Left     CARPAL TUNNEL RELEASE Right     CARPAL TUNNEL RELEASE      FOOT AMPUTATION Left 2/10/2020    Procedure: PARTIAL 1ST RAY AMPUTATION;  Surgeon: Zachary Ramos DPM; [facility];  Service: Podiatry    INCISION AND DRAINAGE OF WOUND Left 1/12/2020    Procedure: INCISION AND DRAINAGE (I&D) EXTREMITY AND REMOVAL OF SESMOID BONE;  Surgeon: Moise Cordova DPM;  [facility];  Service: Podiatry    IR PICC REPO  1/14/2020    KNEE ARTHROSCOPY Left     KNEE ARTHROSCOPY Right     KNEE SURGERY      VA AMPUTATION TOE,I-P JT Bilateral 5/2/2017    Procedure: PARTIAL AMPUTATION RIGHT AND LEFT HALLUX ;  Surgeon: Palma Mckeon DPM;  [facility];  Service: Podiatry    VA AMPUTATION TOE,MT-P JT Left 7/25/2017    Procedure: 2ND TOE AMPUTATION;  Surgeon: Palma Mckeon DPM;  [facility];  Service: Podiatry    SHOULDER ARTHROSCOPY Left     with screws,RTC    SHOULDER SURGERY      TOE AMPUTATION Left 1/8/2020    Procedure: HALLUX AMPUTATION;  Surgeon: Moise Cordova DPM;  [facility];  Service: Podiatry    VASECTOMY       Problem list reviewed  FAMILY HISTORY:  Non-contributory    SOCIAL HISTORY:  Social History     Substance and Sexual Activity   Alcohol Use Never    Frequency: Never     Social History     Substance and Sexual Activity   Drug Use Never     Social History     Tobacco Use   Smoking Status Former Smoker    Packs/day: 1 00    Years: 30 00    Pack years: 30 00    Types: Cigarettes   Smokeless Tobacco Never Used   Tobacco Comment    quit 10 years ago       ALLERGIES:  Allergies   Allergen Reactions    Invokana [Canagliflozin]     Lamisil [Terbinafine] Rash    Lamisil [Terbinafine] Blisters     Wife states " His skin peeled from head to toe"    Other Swelling     Pomegranate - facial swelling, no swelling of tongue, esophagus  Adhesive tape   Latex Rash       MEDICATIONS:  All current active medications have been reviewed  Patient is currently not on any further antibiotic      PHYSICAL EXAM:  Vitals:  Temp:  [97 5 °F (36 4 °C)-98 9 °F (37 2 °C)] 98 9 °F (37 2 °C)  HR:  [64-71] 64  Resp:  [18] 18  BP: (136-150)/(68-71) 136/68  SpO2:  [94 %-98 %] 94 %  Temp (24hrs), Av 2 °F (36 8 °C), Min:97 5 °F (36 4 °C), Max:98 9 °F (37 2 °C)  Current: Temperature: 98 9 °F (37 2 °C)     Physical Exam:  General:  Well-nourished, well-developed, in no acute distress  Awake, alert and oriented x 3  Eyes:  Conjunctive clear with no hemorrhages or effusions  Oropharynx:  No ulcers, no lesions, pharynx benign, no tonsillitis  Neck:  Supple, no lymphadenopathy, no mass, nontender  Lungs:  Expansion symmetric, no rales, no wheezing, no accessory muscle use  Cardiac:  Regular rate and rhythm, normal S1, normal S2, no murmurs  Abdomen:  Soft, nondistended, non-tender, no HSM  Extremities:  Trace leg edema  Left foot incision intact, without drainage or purulence  Mild periwound erythema/warmth  No tenderness  No fluctuance  Skin:  No rashes, no ulcers  Neurological:  Moves all four extremities spontaneously, sensation grossly intact    LABS, IMAGING, & OTHER STUDIES:  Lab Results:  I have personally reviewed pertinent labs  Results from last 7 days   Lab Units 20  1616 20  0447 20  0629   POTASSIUM mmol/L 4 9 4 7 5 1   CHLORIDE mmol/L 105 107 106   CO2 mmol/L 22 23 23   BUN mg/dL 46* 37* 39*   CREATININE mg/dL 1 75* 1 52* 1 65*   EGFR ml/min/1 73sq m 40 47 43   CALCIUM mg/dL 8 8 8 5 8 7   AST U/L 29  --   --    ALT U/L 14  --   --    ALK PHOS U/L 91  --   --      Results from last 7 days   Lab Units 20  1616 20  0447 20  0629   WBC Thousand/uL 9 61  --  9 03   HEMOGLOBIN g/dL 8 8* 7 7* 7 9*   PLATELETS Thousands/uL 371  --  270           Imaging Studies:   I have personally reviewed pertinent imaging study reports and images in PACS  EKG, Pathology, and Other Studies:   I have personally reviewed pertinent reports

## 2020-02-18 NOTE — H&P
H&P Exam - Satya Turpin 75 y o  male MRN: [id number]    Unit/Bed#: E5 -01 Encounter: [id number]    Assessment:  1  S/P left partial 1st ray amputation (DOS: 2/10/20) with underlying residual OM  2  DM type 2 with neuropathy  3  Acute respiratory failure  4  Complete heart block  5  PAF  6  Acute diastolic congestive heart failure  7  CKD stage 3    Plan:  · Admit to podiatry service under Dr Lewis Rivera for residual underlying osteomyelitis at the 1st ray amputation site  Personally spoke with pathology about pathology report  Clean margins from the 1st ray amputation done on 02/10/2020 shows possible OM  It was deemed appropriate that long-term IV antibiotics would be attempted as opposed to additional resection of the 1st metatarsal   Additional resection of the 1st metatarsal could create a gait imbalance  · IV cefepime  Will consult ID for long-term IV antibiotics  · Nonweightbearing left lower extremity  · SLIM consulted for medical management  · This plan was discussed with my attending in full detail  History of Present Illness   Satya Turpin is a 49-year-old male a past medical history of DM type 2 with neuropathy, acute respiratory failure, complete heart block, PAF, acute diastolic congestive heart failure, and CKD stage 3 that presents today for direct admission for left foot osteomyelitis  Patient underwent multiple left foot surgeries within the past month 1st starting with a left hallux amp on 01/08/2020, a left foot I&D on 01/12/2020, and then finally a left partial 1st ray amputation on 02/10/2020  During the time of the partial 1st ray amputation a clean margin was sent to pathology for examination  Patient was discharged on 02/13/2020 with oral doxycycline which he finished yesterday  He recently went to Dr Pretty Salmeron office for postoperative follow-up and it was found that the proximal clean margin came back as possible osteomyelitis    Dr Lewis Rivera suggested admission to the hospital for long-term IV antibiotics  He reports that the foot being much more red yesterday than it is today  He denies any recent nausea, vomiting, fever, chills, shortness of breath, chest pains  Review of Systems   Constitutional: Negative for chills, fatigue and fever  HENT: Negative  Respiratory: Negative for chest tightness, shortness of breath and wheezing  Cardiovascular: Negative for chest pain and leg swelling  Gastrointestinal: Negative for abdominal pain, constipation, diarrhea, nausea and vomiting  Genitourinary: Negative for difficulty urinating  Skin: Positive for color change  Negative for wound  Neurological: Negative for dizziness, syncope, weakness, numbness and headaches  Psychiatric/Behavioral: Negative          Historical Information   Past Medical History:   Diagnosis Date    Arthritis     OA    Bruise of both arms     forearms and both hands    Bruises easily     [facility]     Diabetes mellitus [facility]     Diabetic foot ulcer ([facility]     Eczema     Erectile dysfunction     Gout     Hyperlipidemia     Hypertension     Murmur     Nephropathy     Osteoarthritis     [facility]     Seasonal allergies     Toe infection     bilat great toes    Vertigo     Walks frequently     Wears dentures     upper    Wears glasses      Past Surgical History:   Procedure Laterality Date    CARDIAC PACEMAKER PLACEMENT      CARPAL TUNNEL RELEASE Left     CARPAL TUNNEL RELEASE Right     CARPAL TUNNEL RELEASE      FOOT AMPUTATION Left 2/10/2020    Procedure: PARTIAL 1ST RAY AMPUTATION;  Surgeon: Oniel Caban DPM;  [facility];  Service: Podiatry    INCISION AND DRAINAGE OF WOUND Left 1/12/2020    Procedure: INCISION AND DRAINAGE (I&D) EXTREMITY AND REMOVAL OF SESMOID BONE;  Surgeon: Keyla Maharaj DPM;  [facility];  Service: Podiatry    IR PICC REPO  1/14/2020    KNEE ARTHROSCOPY Left     KNEE ARTHROSCOPY Right     KNEE SURGERY      IN AMPUTATION TOE,I-P JT Bilateral 5/2/2017    Procedure: PARTIAL AMPUTATION RIGHT AND LEFT HALLUX ;  Surgeon: Judy Medellin DPM;  [facility];  Service: Podiatry    IN AMPUTATION TOE,MT-P JT Left 7/25/2017    Procedure: 2ND TOE AMPUTATION;  Surgeon: Judy Medellin DPM;  [facility];  Service: Podiatry    SHOULDER ARTHROSCOPY Left     with screws,RTC    SHOULDER SURGERY      TOE AMPUTATION Left 1/8/2020    Procedure: Saurabh Webber;  Surgeon: Elsi Llanos DPM;  [facility];  Service: Podiatry    VASECTOMY       Social History   Social History     Substance and Sexual Activity   Alcohol Use Never    Frequency: Never     Social History     Substance and Sexual Activity   Drug Use Never     Social History     Tobacco Use   Smoking Status Former Smoker    Packs/day: 1 00    Years: 30 00    Pack years: 30 00    Types: Cigarettes   Smokeless Tobacco Never Used   Tobacco Comment    quit 10 years ago     Family History: non-contributory    Meds/Allergies   all medications and allergies reviewed  Allergies   Allergen Reactions    Invokana [Canagliflozin]     Lamisil [Terbinafine] Rash    Lamisil [Terbinafine] Blisters     Wife states " His skin peeled from head to toe"    Other Swelling     Pomegranate - facial swelling, no swelling of tongue, esophagus  Adhesive tape        Latex Rash       Objective   First Vitals:   Blood Pressure: 150/71 (02/18/20 1404)  Pulse: 71 (02/18/20 1404)  Temperature: 97 5 °F (36 4 °C) (02/18/20 1404)  Temp Source: Temporal (02/18/20 1404)  Respirations: 18 (02/18/20 1404)  SpO2: 98 % (02/18/20 1404)    Current Vitals:   Blood Pressure: 136/68 (02/18/20 1500)  Pulse: 64 (02/18/20 1500)  Temperature: 98 9 °F (37 2 °C) (02/18/20 1500)  Temp Source: Temporal (02/18/20 1404)  Respirations: 18 (02/18/20 1500)  SpO2: 94 % (02/18/20 1500)    No intake or output data in the 24 hours ending 02/18/20 1513    Invasive Devices None                 Physical Exam   Constitutional: He is oriented to person, place, and time  He appears well-developed and well-nourished  HENT:   Head: Normocephalic and atraumatic  Eyes: Pupils are equal, round, and reactive to light  Right eye exhibits no discharge  Left eye exhibits no discharge  Neck: Normal range of motion  Neck supple  Cardiovascular: Normal rate, regular rhythm, normal heart sounds and intact distal pulses  Pedal pulses are non palpable bilaterally  Capillary fill time is less than 3 seconds to all digits  Digital hair is absent  Pulmonary/Chest: Effort normal and breath sounds normal  No respiratory distress  He has no wheezes  He has no rales  He exhibits no tenderness  Abdominal: Soft  Bowel sounds are normal    Musculoskeletal: Normal range of motion  He exhibits no edema or tenderness  MMT is 5/5 bilaterally  No calf pain noted bilaterally  No pain on palpation of bilateral  Left hallux amp noted  Neurological: He is alert and oriented to person, place, and time  Gross sensation is intact, protective sensation is diminished  Skin: Skin is warm  Capillary refill takes less than 2 seconds  There is erythema  Left hallux surgical incision is well coapted with sutures intact  No active drainage present  Mild periwound erythema noted  Foot is not warmer to touch than the contralateral side  No signs of wound dehiscence  No skin necrosis noted   Psychiatric: He has a normal mood and affect  His behavior is normal  Judgment and thought content normal    Vitals reviewed  Left foot    Lab Results:  Pending    Code Status: Level 1 - Full Code  Advance Directive and Living Will: Yes      Counseling / Coordination of Care: Total floor / unit time spent today 30 minutes

## 2020-02-19 ENCOUNTER — APPOINTMENT (INPATIENT)
Dept: RADIOLOGY | Facility: HOSPITAL | Age: 65
DRG: 637 | End: 2020-02-19
Payer: MEDICARE

## 2020-02-19 DIAGNOSIS — M86.9 OSTEOMYELITIS OF LEFT FOOT, UNSPECIFIED TYPE (HCC): Primary | ICD-10-CM

## 2020-02-19 LAB
ANION GAP SERPL CALCULATED.3IONS-SCNC: 11 MMOL/L (ref 4–13)
BUN SERPL-MCNC: 46 MG/DL (ref 5–25)
CALCIUM SERPL-MCNC: 8.8 MG/DL (ref 8.3–10.1)
CHLORIDE SERPL-SCNC: 108 MMOL/L (ref 100–108)
CO2 SERPL-SCNC: 22 MMOL/L (ref 21–32)
CREAT SERPL-MCNC: 1.57 MG/DL (ref 0.6–1.3)
ERYTHROCYTE [DISTWIDTH] IN BLOOD BY AUTOMATED COUNT: 15.3 % (ref 11.6–15.1)
GFR SERPL CREATININE-BSD FRML MDRD: 45 ML/MIN/1.73SQ M
GLUCOSE SERPL-MCNC: 134 MG/DL (ref 65–140)
GLUCOSE SERPL-MCNC: 163 MG/DL (ref 65–140)
GLUCOSE SERPL-MCNC: 77 MG/DL (ref 65–140)
GLUCOSE SERPL-MCNC: 81 MG/DL (ref 65–140)
HCT VFR BLD AUTO: 28.6 % (ref 36.5–49.3)
HGB BLD-MCNC: 9.1 G/DL (ref 12–17)
MCH RBC QN AUTO: 29.3 PG (ref 26.8–34.3)
MCHC RBC AUTO-ENTMCNC: 31.8 G/DL (ref 31.4–37.4)
MCV RBC AUTO: 92 FL (ref 82–98)
PLATELET # BLD AUTO: 308 THOUSANDS/UL (ref 149–390)
PMV BLD AUTO: 8.7 FL (ref 8.9–12.7)
POTASSIUM SERPL-SCNC: 4.7 MMOL/L (ref 3.5–5.3)
RBC # BLD AUTO: 3.11 MILLION/UL (ref 3.88–5.62)
SODIUM SERPL-SCNC: 141 MMOL/L (ref 136–145)
WBC # BLD AUTO: 9.83 THOUSAND/UL (ref 4.31–10.16)

## 2020-02-19 PROCEDURE — 36569 INSJ PICC 5 YR+ W/O IMAGING: CPT

## 2020-02-19 PROCEDURE — 71045 X-RAY EXAM CHEST 1 VIEW: CPT

## 2020-02-19 PROCEDURE — 94760 N-INVAS EAR/PLS OXIMETRY 1: CPT

## 2020-02-19 PROCEDURE — 99232 SBSQ HOSP IP/OBS MODERATE 35: CPT | Performed by: INTERNAL MEDICINE

## 2020-02-19 PROCEDURE — C1751 CATH, INF, PER/CENT/MIDLINE: HCPCS

## 2020-02-19 PROCEDURE — 85027 COMPLETE CBC AUTOMATED: CPT | Performed by: PODIATRIST

## 2020-02-19 PROCEDURE — 80048 BASIC METABOLIC PNL TOTAL CA: CPT | Performed by: PODIATRIST

## 2020-02-19 PROCEDURE — 82948 REAGENT STRIP/BLOOD GLUCOSE: CPT

## 2020-02-19 PROCEDURE — 94660 CPAP INITIATION&MGMT: CPT

## 2020-02-19 RX ADMIN — LOSARTAN POTASSIUM 25 MG: 25 TABLET, FILM COATED ORAL at 08:36

## 2020-02-19 RX ADMIN — CEFEPIME HYDROCHLORIDE 2000 MG: 2 INJECTION, POWDER, FOR SOLUTION INTRAVENOUS at 03:33

## 2020-02-19 RX ADMIN — FERROUS GLUCONATE 324 MG: 324 TABLET ORAL at 21:58

## 2020-02-19 RX ADMIN — METOPROLOL TARTRATE 50 MG: 50 TABLET, FILM COATED ORAL at 21:58

## 2020-02-19 RX ADMIN — APIXABAN 5 MG: 5 TABLET, FILM COATED ORAL at 08:36

## 2020-02-19 RX ADMIN — FERROUS GLUCONATE 324 MG: 324 TABLET ORAL at 08:37

## 2020-02-19 RX ADMIN — CEFEPIME HYDROCHLORIDE 2000 MG: 2 INJECTION, POWDER, FOR SOLUTION INTRAVENOUS at 19:03

## 2020-02-19 RX ADMIN — FUROSEMIDE 40 MG: 40 TABLET ORAL at 08:36

## 2020-02-19 RX ADMIN — ALLOPURINOL 300 MG: 100 TABLET ORAL at 08:36

## 2020-02-19 RX ADMIN — METOPROLOL TARTRATE 50 MG: 50 TABLET, FILM COATED ORAL at 08:36

## 2020-02-19 RX ADMIN — APIXABAN 5 MG: 5 TABLET, FILM COATED ORAL at 17:17

## 2020-02-19 RX ADMIN — TAMSULOSIN HYDROCHLORIDE 0.4 MG: 0.4 CAPSULE ORAL at 16:48

## 2020-02-19 RX ADMIN — PRAVASTATIN SODIUM 40 MG: 40 TABLET ORAL at 16:48

## 2020-02-19 RX ADMIN — FUROSEMIDE 40 MG: 40 TABLET ORAL at 17:17

## 2020-02-19 NOTE — PROGRESS NOTES
Progress Note - Angelica Abarca 1955, 75 y o  male MRN: [id number]    Unit/Bed#: E5 -01 Encounter: [id number]    Primary Care Provider: Salty Francis DO   Date and time admitted to hospital: 2020  1:48 PM        * Osteomyelitis of left foot ([address] )  Assessment & Plan  · Management per ID and primary service    Diabetes mellitus with neuropathy [facility])  Assessment & Plan  Lab Results   Component Value Date    HGBA1C 6 6      · DM2 complicated by ckd 3 and neuropathy  · Excellent A1C  · Inpatient blood sugars at goal  · HOLD metformin  · Substitute Lantus 15 units daily for insulin degludec while inpatient  · Avoid hypoglycemia      PAF (paroxysmal atrial fibrillation) (HCC)  Assessment & Plan  · On chronic anticoagulation with eliquis  · Rate is controlled with lopressor 50 mg BID    Chronic diastolic (congestive) heart failure (HCC)  Assessment & Plan  Wt Readings from Last 3 Encounters:   20 93 1 kg (205 lb 4 oz)   20 95 8 kg (211 lb 3 2 oz)   20 95 8 kg (211 lb 3 2 oz)     Continue lasix 40 mg BID   Continue lopressor 50 mg BID and losartan 25 mg daily    Hyperlipidemia  Assessment & Plan  · Switched zocor to pravachol while inpatient      VTE Pharmacologic Prophylaxis:   Pharmacologic: Apixaban (Eliquis)  Mechanical VTE Prophylaxis in Place: No    Discussions with Specialists or Other Care Team Provider: Infectious disease Dr Goyo Varela    Time Spent for Care: 20 minutes  More than 50% of total time spent on counseling and coordination of care as described above      Current Length of Stay: 1 day(s)    Current Patient Status: Inpatient     Code Status: Level 1 - Full Code      Subjective:   Denies pain/fever/chills  Anticipating discharge tomorrow    Objective:     Vitals:   Temp (24hrs), Av 6 °F (36 4 °C), Min:97 4 °F (36 3 °C), Max:97 8 °F (36 6 °C)    Temp:  [97 4 °F (36 3 °C)-97 8 °F (36 6 °C)] 97 4 °F (36 3 °C)  HR:  [61-65] 61  Resp:  [18] 18  BP: (141-148)/(65-75) 148/75  SpO2:  [93 %-98 %] 95 %  Body mass index is 29 45 kg/m²  Input and Output Summary (last 24 hours):     No intake or output data in the 24 hours ending 02/19/20 1839    Physical Exam:     Physical Exam   Constitutional: He is oriented to person, place, and time  He appears well-developed  No distress  HENT:   Head: Normocephalic and atraumatic  Eyes: No scleral icterus  Neck: No JVD present  Cardiovascular: Regular rhythm  Murmur heard  Pulmonary/Chest: No stridor  No respiratory distress  He has no wheezes  Abdominal: Soft  He exhibits no distension  There is no tenderness  There is no guarding  Musculoskeletal: He exhibits deformity  Neurological: He is alert and oriented to person, place, and time  Skin: Skin is warm and dry  He is not diaphoretic  Psychiatric: He has a normal mood and affect  His behavior is normal      Additional Data:     Labs:    Results from last 7 days   Lab Units 02/19/20  0559 02/18/20  1616   WBC Thousand/uL 9 83 9 61   HEMOGLOBIN g/dL 9 1* 8 8*   HEMATOCRIT % 28 6* 27 7*   PLATELETS Thousands/uL 308 371   NEUTROS PCT %  --  67   LYMPHS PCT %  --  13*   MONOS PCT %  --  7   EOS PCT %  --  12*     Results from last 7 days   Lab Units 02/19/20  0559 02/18/20  1616   SODIUM mmol/L 141 138   POTASSIUM mmol/L 4 7 4 9   CHLORIDE mmol/L 108 105   CO2 mmol/L 22 22   BUN mg/dL 46* 46*   CREATININE mg/dL 1 57* 1 75*   ANION GAP mmol/L 11 11   CALCIUM mg/dL 8 8 8 8   ALBUMIN g/dL  --  2 3*   TOTAL BILIRUBIN mg/dL  --  0 36   ALK PHOS U/L  --  91   ALT U/L  --  14   AST U/L  --  29   GLUCOSE RANDOM mg/dL 77 102         Results from last 7 days   Lab Units 02/19/20  1629 02/19/20  1114 02/19/20  0844 02/18/20  1559 02/13/20  1106 02/13/20  0713 02/12/20  2124   POC GLUCOSE mg/dl 163* 134 81 94 138 109 106                   * I Have Reviewed All Lab Data Listed Above  * Additional Pertinent Lab Tests Reviewed:  All Labs Within Last 24 Hours Reviewed    Imaging:    Imaging Reports Reviewed Today Include: none    Recent Cultures (last 7 days):           Last 24 Hours Medication List:     Current Facility-Administered Medications:  acetaminophen 650 mg Oral Q6H PRN Philippe Miner DPM    allopurinol 300 mg Oral QAM Philippe Miner DPM    apixaban 5 mg Oral BID Philippe Miner DPM    cefepime 2,000 mg Intravenous Q12H Philippe Miner DPM Last Rate: 2,000 mg (02/19/20 0333)   ferrous gluconate 324 mg Oral BID AC Philippe Miner DPM    furosemide 40 mg Oral BID Philippe Miner DPM    insulin glargine 15 Units Subcutaneous QAM Lev Bettencourt MD    losartan 25 mg Oral Daily Philippe Miner DPM    metolazone 5 mg Oral See Admin Instructions Philippe Miner DPM    metoprolol tartrate 50 mg Oral Q12H [address] Philippe Miner DPM    pravastatin 40 mg Oral Daily With Danae Connell DPM    tamsulosin 0 4 mg Oral Daily With Danae Connell DPM         Today, Patient Was Seen By: Lev Bettencourt MD    ** Please Note: Dictation voice to text software may have been used in the creation of this document   **

## 2020-02-19 NOTE — CONSULTS
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 3 and neuropathy  · Excellent A1C  · HOLD metformin  · Substitute Lantus 15 units daily for insulin degludec while inpatient  · Goal blood sugar 140-180  · Avoid hypoglycemia      PAF (paroxysmal atrial fibrillation) (HCC)  Assessment & Plan  · On chronic anticoagulation with eliquis  · Rate is controlled with lopressor 50 mg BID    Chronic diastolic (congestive) heart failure (HCC)  Assessment & Plan  Wt Readings from Last 3 Encounters:   02/13/20 95 8 kg (211 lb 3 2 oz)   02/07/20 95 8 kg (211 lb 3 2 oz)   01/29/20 94 5 kg (208 lb 5 4 oz)     Continue lasix 40 mg BID   Euvolemic on exam    Hyperlipidemia  Assessment & Plan  · Switch zocor to pravachol while inpatient      VTE Prophylaxis: eliquis    Counseling / Coordination of Care Time: 30 minutes  Greater than 50% of total time spent on patient counseling and coordination of care  Collaboration of Care: Were Recommendations Directly Discussed with Primary Treatment Team? - No     History of Present Illness:    Verlyn Boas is a 75 y o  male who is originally admitted to the podiatry service due to residual osteomyelitis of the left foot  He recently underwent left hallux amputation on January 8, 2020, left foot partial 1st ray amputation on January 10, 2020 and was discharged home on oral antibiotics  He was seen in the office today for routine hospital follow-up  On exam is left foot was noted to be redder  Biopsies also came back positive for residual osteomyelitis    He was admitted directly to the hospital for intravenous antibiotics  We are consulted for medical management  At the time of my examination the patient felt well  He denied any fever, chills, nausea, vomiting, abdominal pain, difficulty breathing, itching, dizziness, lightheadedness  He denied pain on the left foot  He mentioned that if he did not follow-up with his podiatrist, he would not have been in the hospital because he felt very well  Review of Systems:    Review of Systems   Constitutional: Negative  HENT: Negative  Eyes: Negative  Respiratory: Negative  Cardiovascular: Negative  Gastrointestinal: Negative  Genitourinary: Negative  Skin: Negative  Neurological: Negative  Psychiatric/Behavioral: Negative  All other systems reviewed and are negative        Past Medical and Surgical History:     Past Medical History:   Diagnosis Date    Arthritis     OA    Bruise of both arms     forearms and both hands    Bruises easily     [facility]     Diabetes mellitus [facility]     Diabetic foot ulcer [facility]     Eczema     Erectile dysfunction     Gout     Hyperlipidemia     Hypertension     Murmur     Nephropathy     Osteoarthritis     [facility]     Seasonal allergies     Toe infection     bilat great toes    Vertigo     Walks frequently     Wears dentures     upper    Wears glasses        Past Surgical History:   Procedure Laterality Date    CARDIAC PACEMAKER PLACEMENT      CARPAL TUNNEL RELEASE Left     CARPAL TUNNEL RELEASE Right     CARPAL TUNNEL RELEASE      FOOT AMPUTATION Left 2/10/2020    Procedure: PARTIAL 1ST RAY AMPUTATION;  Surgeon: Verónica Bhatia DPM;  [facility];  Service: Podiatry    INCISION AND DRAINAGE OF WOUND Left 1/12/2020    Procedure: INCISION AND DRAINAGE (I&D) EXTREMITY AND REMOVAL OF SESMOID BONE;  Surgeon: Michael Castillo DPM;  [facility];  Service: [address] REPO  1/14/2020    KNEE ARTHROSCOPY Left     KNEE ARTHROSCOPY Right     KNEE SURGERY      LA AMPUTATION TOE,I-P JT Bilateral 5/2/2017    Procedure: PARTIAL AMPUTATION RIGHT AND LEFT HALLUX ;  Surgeon: Enrique Plasencia DPM;  [facility];  Service: Podiatry    LA AMPUTATION TOE,MT-P JT Left 7/25/2017    Procedure: 2ND TOE AMPUTATION;  Surgeon: Enrique Plasencia DPM;  [facility];  Service: Podiatry    SHOULDER ARTHROSCOPY Left     with screws,RTC    SHOULDER SURGERY      TOE AMPUTATION Left 1/8/2020    Procedure: Mitzi Rodneyjesus alberto;  Surgeon: Charu Younger DPM;  [facility];  Service: Podiatry    VASECTOMY         Meds/Allergies:    PTA meds:   Prior to Admission Medications   Prescriptions Last Dose Informant Patient Reported? Taking? Cholecalciferol (VITAMIN D-3) 1000 units CAPS 2/18/2020 at Unknown time Self Yes Yes   Sig: Take 1 capsule by mouth every morning     Dupilumab, Asthma, (DUPIXENT SC) More than a month at Unknown time Self Yes No   Sig: Inject under the skin    Insulin Degludec-Liraglutide (Insulin Degludec-Liraglutide) 100 units-3 6 mg/mL injection pen 2/18/2020 at Unknown time  Yes Yes   Sig: Inject 19 Units under the skin daily   LORazepam (ATIVAN) 0 5 mg tablet Past Week at Unknown time  No Yes   Sig: Take 1 tablet (0 5 mg total) by mouth every 8 (eight) hours as needed for anxiety   Misc   Devices (FREE SPIRIT KNEE/LEG WALKER) [address] [facility] 2/18/2020 at Unknown time  No Yes   Sig: by Does not apply route 3 (three) times a day   Multiple Vitamin (MULTIVITAMIN) tablet 2/18/2020 at Unknown time Self Yes Yes   Sig: Take 1 tablet by mouth daily IN AM   ONE TOUCH ULTRA TEST test strip 2/18/2020 at Unknown time  No Yes   Sig: USE TO TEST BLOOD SUGAR 3 TIMES A DAY   SULFAMETHOXAZOLE-TRIMETHOPRIM PO Not Taking at Unknown time  Yes No   Sig: Take 2 tablets by mouth 2 (two) times a day   allopurinol (ZYLOPRIM) 300 mg tablet 2/18/2020 at Unknown time Self No Yes   Sig: TAKE 1 TABLET (300 MG TOTAL) BY MOUTH EVERY MORNING   amLODIPine (NORVASC) 10 mg tablet 2020 at Unknown time  No Yes   Sig: TAKE 1 TABLET BY MOUTH EVERY DAY   apixaban (ELIQUIS) 5 mg 2020 at Unknown time  No Yes   Sig: Take 1 tablet (5 mg total) by mouth every 12 (twelve) hours   betamethasone valerate (VALISONE) 0 1 % cream 2020 at Unknown time Self Yes Yes   Sig: as needed    doxycycline hyclate (VIBRAMYCIN) 100 mg capsule Not Taking at Unknown time  No No   Sig: Take 1 capsule (100 mg total) by mouth every 12 (twelve) hours for 4 days   Patient not taking: Reported on 2020   econazole nitrate 1 % cream Not Taking at Unknown time  Yes No   Sig: Apply 1 application topically as needed To affected area   ferrous gluconate (FERGON) 324 mg tablet 2020 at Unknown time  No Yes   Sig: Take 1 tablet (324 mg total) by mouth 2 (two) times a day before meals   furosemide (LASIX) 40 mg tablet 2020 at Unknown time  No Yes   Sig: Take 1 tablet (40 mg total) by mouth 2 (two) times a day   glucagon (GLUCAGON EMERGENCY) 1 MG injection Unknown at Unknown time Self No No   Sig: Inject 1 mg under the skin once as needed for low blood sugar for up to 1 dose   glucose blood (ONE TOUCH ULTRA TEST) test strip 2020 at Unknown time Self No Yes   Si each by Other route 3 (three) times a day Use to test blood sugar   hydrOXYzine HCL (ATARAX) 10 mg tablet More than a month at Unknown time Self Yes No   Sig: Take 10 mg by mouth every 6 (six) hours as needed     losartan (COZAAR) 25 mg tablet 2020 at Unknown time  No Yes   Sig: Take 1 tablet (25 mg total) by mouth daily   metFORMIN (GLUCOPHAGE) 500 mg tablet 2020 at Unknown time  No Yes   Sig: Take 1 tablet (500 mg total) by mouth 2 (two) times a day with meals   metolazone (ZAROXOLYN) 5 mg tablet Not Taking at Unknown time Self No No   Sig: Take 1 tablet (5 mg total) by mouth see administration instructions One tablet every 6 days prn and as directed by physician Patient not taking: Reported on 2/18/2020   metoprolol tartrate (LOPRESSOR) 50 mg tablet 2/18/2020 at Unknown time Self No Yes   Sig: Take 1 tablet (50 mg total) by mouth every 12 (twelve) hours   omega-3-acid ethyl esters (LOVAZA) 1 g capsule 2/18/2020 at Unknown time Self No Yes   Sig: Take 2 capsules (2 g total) by mouth 2 (two) times a day   simvastatin (ZOCOR) 20 mg tablet 2/18/2020 at Unknown time Self No Yes   Sig: Take 1 tablet (20 mg total) by mouth daily at bedtime   tamsulosin (FLOMAX) 0 4 mg 2/17/2020 at Unknown time  Yes Yes   Sig: Take 0 4 mg by mouth daily with dinner      Facility-Administered Medications: None       Allergies: Allergies   Allergen Reactions    Invokana [Canagliflozin]     Lamisil [Terbinafine] Rash    Lamisil [Terbinafine] Blisters     Wife states " His skin peeled from head to toe"    Other Swelling     Pomegranate - facial swelling, no swelling of tongue, esophagus  Adhesive tape   Latex Rash       Social History:     Marital Status: /Civil Union    Substance Use History:   Social History     Substance and Sexual Activity   Alcohol Use Never    Frequency: Never     Social History     Tobacco Use   Smoking Status Former Smoker    Packs/day: 1 00    Years: 30 00    Pack years: 30 00    Types: Cigarettes   Smokeless Tobacco Never Used   Tobacco Comment    quit 10 years ago     Social History     Substance and Sexual Activity   Drug Use Never       Family History:    Family History   Problem Relation Age of Onset    Heart disease Father     No Known Problems Mother     Hypertension Father     Pulmonary embolism Father        Physical Exam:     Vitals:   Blood Pressure: 136/68 (02/18/20 1500)  Pulse: 64 (02/18/20 1500)  Temperature: 98 9 °F (37 2 °C) (02/18/20 1500)  Temp Source: Temporal (02/18/20 1404)  Respirations: 18 (02/18/20 1500)  SpO2: 94 % (02/18/20 1500)    Physical Exam   Constitutional: He appears well-developed  No distress     HENT:   Head: Normocephalic and atraumatic  Eyes: No scleral icterus  Neck: No JVD present  Cardiovascular: Regular rhythm  Murmur heard  Pulmonary/Chest: Effort normal  No stridor  No respiratory distress  He has no wheezes  Abdominal: Soft  He exhibits no distension  There is no tenderness  There is no guarding  Musculoskeletal: He exhibits deformity  Skin: Skin is warm and dry  He is not diaphoretic  There is erythema  Psychiatric: He has a normal mood and affect  His behavior is normal    Vitals reviewed  Additional Data:     Lab Results: I have personally reviewed pertinent reports  Results from last 7 days   Lab Units 02/18/20  1616   WBC Thousand/uL 9 61   HEMOGLOBIN g/dL 8 8*   HEMATOCRIT % 27 7*   PLATELETS Thousands/uL 371   NEUTROS PCT % 67   LYMPHS PCT % 13*   MONOS PCT % 7   EOS PCT % 12*     Results from last 7 days   Lab Units 02/18/20  1616   SODIUM mmol/L 138   POTASSIUM mmol/L 4 9   CHLORIDE mmol/L 105   CO2 mmol/L 22   BUN mg/dL 46*   CREATININE mg/dL 1 75*   ANION GAP mmol/L 11   CALCIUM mg/dL 8 8   ALBUMIN g/dL 2 3*   TOTAL BILIRUBIN mg/dL 0 36   ALK PHOS U/L 91   ALT U/L 14   AST U/L 29   GLUCOSE RANDOM mg/dL 102             Lab Results   Component Value Date/Time    HGBA1C 6 6 02/03/2020    HGBA1C 6 4 (H) 01/07/2020 12:07 PM    HGBA1C 6 1 09/19/2019 09:03 AM    HGBA1C 6 4 (H) 06/12/2019 09:14 AM    HGBA1C 10 4 (H) 12/12/2015 05:00 AM    HGBA1C 9 6 (H) 05/20/2015 12:00 AM    HGBA1C 7 5 (H) 02/21/2014 10:08 PM     Results from last 7 days   Lab Units 02/13/20  1106 02/13/20  0713 02/12/20  2124 02/12/20  1643 02/12/20  1125 02/12/20  0728 02/11/20  2102   POC GLUCOSE mg/dl 138 109 106 138 150* 98 157*           Imaging:     No orders to display       EKG, Pathology, and Other Studies Reviewed on Admission:   · ELBERT January 15, 2020-EF 60% with no regional wall motion abnormalities  Marked mitral valve annular calcification with moderate stenosis    Mild aortic stenosis and mild aortic regurgitation  ** Please Note: This note has been constructed using a voice recognition system   **

## 2020-02-19 NOTE — PROGRESS NOTES
Progress Note - Infectious Disease   Germaine Esquivel 75 y o  male MRN: [id number]  Unit/Bed#: E5 -01 Encounter: [id number]    Impression/Plan:  1  Residual osteomyelitis of left 1st metatarsal stump after distal met head resection  Bone culture grew MDR E coli  Probability of cure with antibiotics alone for foot osteomyelitis is extremely low  Ideally, patient should undergo further bone resection, however, per discussion podiatry, there is concern for loss of stability and ambulation with further surgery  Therefore, we will attempt to treat issues residual osteomyelitis with prolonged IV antibiotic course  If patient fails this treatment course, he will need further bone resection, and would be at risk for foot loss  Fortunately the patient remains clinically and systemically well  He has no evidence of sepsis or systemic toxicity  He has no fever or leukocytosis  He was started on IV cefepime and is tolerating antibiotics without difficulty  PICC line was inserted today so patient can complete a six week antibiotic treatment course   -continue IV cefepime through 3/30/2020 for a six week antibiotic treatment course  -weekly CBCD and BMP while on IV antibiotics  -PICC line must be discontinued after patient's final dose of IV antibiotics on 03/30/2020  -monitor vitals  -serial left foot exams  -local wound care per Podiatry  -continue close follow-up with Podiatry  -patient will follow-up with the outpatient infectious disease office on Tuesday, 03/03/2020 at 7:30am  -I dropped off scripts for antibiotics and lab work in patient's chart on 02/19/2020     2  CKD stage 3  Upon review of patient's past medical records it appears his baseline creatinine is approximately 1  Creatinine is slightly elevated at 1 53 today   -monitor BMP  -dose adjust antibiotics for renal function as needed     3  Type 2 diabetes mellitus with neuropathy and long-term insulin use    Patient's last hemoglobin A1c was 6 4% on 2020  Recommend tight glycemic control for overall health, especially in the setting of infection   -blood glucose management per primary service     4  Status post left hallux amputation in January, followed by left partial 1st ray amputation in early February  With concern for residual osteomyelitis as above  He is following closely with Podiatry   -continue close follow-up with Podiatry    5  Chronic diastolic heart failure  In setting of aortic and mitral valve stenosis and complete heart block  He has a Medtronic dual chamber pacemaker in place  6  Paroxysmal AFib  Patient is on Eliquis  Patient is stable for discharge from ID standpoint  Above plan was discussed in detail with patient at the bedside  I left voicemail update for patient's wife per his request   Above plan was discussed in detail with PICC RN, Ron Carpenter  Above plan was discussed in detail with Podiatry residents, Dr Teri White and Dr Abraham Duran  Antibiotics:  Cefepime 1  Antibiotics 1    Subjective:  Patient reports he is actually feeling pretty good despite everything with his foot  He does not have pain today  He is pleased that the redness seems to be less on the end of his left foot  He denies fever, chills, sweats, shakes; no nausea, vomiting, abdominal pain, diarrhea, or dysuria; no cough, shortness of breath, or chest pain  No concerns with his new right upper arm PICC  No new symptoms  Objective:  Vitals:  Temp:  [97 5 °F (36 4 °C)-98 9 °F (37 2 °C)] 97 5 °F (36 4 °C)  HR:  [62-71] 65  Resp:  [18] 18  BP: (136-150)/(65-71) 142/67  SpO2:  [93 %-98 %] 96 %  Temp (24hrs), Av 9 °F (36 6 °C), Min:97 5 °F (36 4 °C), Max:98 9 °F (37 2 °C)  Current: Temperature: 97 5 °F (36 4 °C)    Physical Exam:   General Appearance:  Alert, interactive, nontoxic, no acute distress  Throat: Oropharynx moist without lesions      Lungs:   Clear to auscultation bilaterally; no wheezes, rhonchi or rales; respirations unlabored Heart:  RRR; no murmur, rub or gallop   Abdomen:   Soft, non-tender, non-distended, positive bowel sounds  Extremities: No clubbing or cyanosis; right upper extremity PICC line intact, dressing is clean/dry, no erythema spreading from site  Patient with patchy warm erythema on the left middle and distal shin  Skin: No new rashes or lesions noted on exposed skin  Left distal 1st met head region with intact sutures, no active drainage or bleeding, there is some erythema along the incision line and some faint spreading erythema over the dorsal metatarsal region       Labs, Imaging, & Other studies:   All pertinent labs and imaging studies were personally reviewed  Results from last 7 days   Lab Units 02/19/20  0559 02/18/20  1616 02/13/20  0447   WBC Thousand/uL 9 83 9 61  --    HEMOGLOBIN g/dL 9 1* 8 8* 7 7*   PLATELETS Thousands/uL 308 371  --      Results from last 7 days   Lab Units 02/19/20  0559 02/18/20  1616   POTASSIUM mmol/L 4 7 4 9   CHLORIDE mmol/L 108 105   CO2 mmol/L 22 22   BUN mg/dL 46* 46*   CREATININE mg/dL 1 57* 1 75*   EGFR ml/min/1 73sq m 45 40   CALCIUM mg/dL 8 8 8 8   AST U/L  --  29   ALT U/L  --  14   ALK PHOS U/L  --  91

## 2020-02-19 NOTE — ASSESSMENT & PLAN NOTE
Lab Results   Component Value Date    HGBA1C 6 6 51/94/3529     · DM2 complicated by ckd 3 and neuropathy  · Excellent A1C  · HOLD metformin  · Substitute Lantus 15 units daily for insulin degludec while inpatient  · Goal blood sugar 140-180  · Avoid hypoglycemia

## 2020-02-19 NOTE — DISCHARGE SUMMARY
Discharge Summary -   Carlitos Titus 75 y o  male MRN: [id number]  Unit/Bed#: E5 -01 Encounter: [id number]    Admission Date: 2/18/2020     Admitting Diagnosis: Osteomyelitis ([address] ) [M86 9]    HPI: Carlitos Titus is a 28-year-old male a past medical history of DM type 2 with neuropathy, acute respiratory failure, complete heart block, PAF, acute diastolic congestive heart failure, and CKD stage 3 that presented for direct admission for left foot osteomyelitis  Patient underwent multiple left foot surgeries within the past month 1st starting with a left hallux amp on 01/08/2020, a left foot I&D on 01/12/2020, and then finally a left partial 1st ray amputation on 02/10/2020  During the time of the partial 1st ray amputation a clean margin was sent to pathology for examination  Patient was discharged on 02/13/2020 with oral doxycycline which he finished yesterday  He recently went to Dr Kostas Gotti office for postoperative follow-up and it was found that the proximal clean margin came back as possible osteomyelitis  Dr Lyla Nielson suggested admission to the hospital for long-term IV antibiotics  He reported that the foot being much more red  He denied any recent nausea, vomiting, fever, chills, shortness of breath, chest pains  Procedures Performed: * Cannot find OR case *    Hospital Course: Patient was admitted to the podiatry service on 02/18/2020 under Dr Lyla Nielson for residual osteomyelitis of the partial left 1st ray amputation  Patient was started on IV cefepime upon admission and Infectious Disease was consulted  Internal Medicine was also consulted for medical management  After Infectious Disease saw the patient, they recommended IV cefepime through 3/30/30 to complete a 6 week course  A PICC line was placed on 02/19/2020, and the IV antibiotics prescription was sent by Infectious Disease  Case management was consulted to set up VNA for antibiotic administration as an outpatient    Patient was stable for discharge on 2/20/20  Prior to discharge, IV antibiotics, and VNA was set up  Patient will follow up with Dr Abdi Cramer, and Infectious Disease as an outpatient for follow-up  He was instructed to continue his nonweightbearing status on his left foot  Significant Findings, Care, Treatment and Services Provided:  As stated above    Complications:  None    Discharge Diagnosis:  Left 1st metatarsal osteomyelitis    Condition at Discharge: fair     Discharge instructions/Information to patient and family:   See after visit summary for information provided to patient and family  Provisions for Follow-Up Care/Important appointments:    See after visit summary for information related to follow-up care and any pertinent home health orders  Disposition: Home    Planned Readmission: No    Discharge Statement   I spent 30 minutes discharging the patient  This time was spent on the day of discharge  I had direct contact with the patient on the day of discharge  The details of this patient's discharge     Discharge Medications:  See after visit summary for reconciled discharge medications provided to patient and family

## 2020-02-19 NOTE — ASSESSMENT & PLAN NOTE
Wt Readings from Last 3 Encounters:   02/19/20 93 1 kg (205 lb 4 oz)   02/13/20 95 8 kg (211 lb 3 2 oz)   02/07/20 95 8 kg (211 lb 3 2 oz)     Continue lasix 40 mg BID   Continue lopressor 50 mg BID and losartan 25 mg daily

## 2020-02-19 NOTE — PROGRESS NOTES
Progress Note - Podiatry  Shira Sanders 75 y o  male MRN: [id number]  Unit/Bed#: E5 -01 Encounter: [id number]    Assessment:  1  S/P left partial 1st ray amputation (DOS: 2/10/20) with underlying residual OM  2  DM type 2 with neuropathy  3  Acute respiratory failure  4  Complete heart block  5  PAF  6  Acute diastolic congestive heart failure  7  CKD stage 3     Plan:  - left foot incision site appears stable with intact sutures no signs of wound dehiscence noted  Dressed with adaptic and DSD  - continue NWB to LLE   - Continue IV abx as per ID recs, IV cefepime till 3/30/20 for 6 weeks  PICC in place  Appreciate ID for recommendations   - pt will f/u with Dr Junaid Betancourt outpt  - F/u with ID outpt  - appreciate SLIM for medical management   - anticipate D/c to home later today pending CM set up with VNA     Subjective/Objective   Chief Complaint: No chief complaint on file  Subjective: 75 y o  y/o male was seen and evaluated at bedside  Pt denies any acute events overnight  No n/v/f/sob and chest pain  Blood pressure 142/67, pulse 65, temperature 97 5 °F (36 4 °C), temperature source Temporal, resp  rate 18, weight 93 1 kg (205 lb 4 oz), SpO2 96 %  ,Body mass index is 29 45 kg/m²  Invasive Devices     Peripherally Inserted Central Catheter Line            PICC Line 76/83/00 Right Basilic less than 1 day          Peripheral Intravenous Line            Peripheral IV 02/18/20 Right Wrist less than 1 day                Physical Exam:   General: Alert, cooperative and no distress  Lungs: Non labored breathing  Heart: Positive S1, S2  Abdomen: Soft, non-tender  Extremity: Left foot incision site appears stable with intact sutures  No signs of wound dehiscence noted  Erythema to the left foot has been resolving  Left lower extremity is warm to touch  NVS and MSK at baseline                 Lab, Imaging and other studies:   CBC:   Lab Results   Component Value Date    WBC 9 83 02/19/2020    HGB 9 1 (L) 02/19/2020    HCT 28 6 (L) 02/19/2020    MCV 92 02/19/2020     02/19/2020    MCH 29 3 02/19/2020    MCHC 31 8 02/19/2020    RDW 15 3 (H) 02/19/2020    MPV 8 7 (L) 02/19/2020    NRBC 0 02/18/2020   , CMP:   Lab Results   Component Value Date    SODIUM 141 02/19/2020    K 4 7 02/19/2020     02/19/2020    CO2 22 02/19/2020    BUN 46 (H) 02/19/2020    CREATININE 1 57 (H) 02/19/2020    CALCIUM 8 8 02/19/2020    AST 29 02/18/2020    ALT 14 02/18/2020    ALKPHOS 91 02/18/2020    EGFR 45 02/19/2020       Imaging: I have personally reviewed pertinent films in PACS  EKG, Pathology, and Other Studies: I have personally reviewed pertinent reports

## 2020-02-19 NOTE — ASSESSMENT & PLAN NOTE
Wt Readings from Last 3 Encounters:   02/13/20 95 8 kg (211 lb 3 2 oz)   02/07/20 95 8 kg (211 lb 3 2 oz)   01/29/20 94 5 kg (208 lb 5 4 oz)     Continue lasix 40 mg BID   Euvolemic on exam

## 2020-02-19 NOTE — SOCIAL WORK
CM s/w the pt via phone to discuss VNA, Pharmacy/Infusion  preferences  The pt chose [facility] because he used them previously  CM sent referrals and orders to [facility] and [facility] with pricing of IV ABX totaling $1541 59 for 40 days, with the pt having to provide 50% upfront before delivery  CM s/w the pt and his wife Royal Christensen, they both agreed to pricing  CM notified \[facility] [place] [facility]\"" via [facility] and provided Shayla's number ([phone]) for contact  CM s/w Oren from [facility] who stated there was low staffing  Edgar Rodriguez stated that since the pt had been with them in the past they could do a Saturday 2/22 start if the pt and wife are comfortable with self administering  CM s/w the pt via phone, he stated that his wife can administer the meds, and that he is comfortable with the Saturday start  CM notified [facility] via [facility]  YI won continue to follow

## 2020-02-19 NOTE — DISCHARGE INSTRUCTIONS
DISCONTINUE PICC AFTER FINAL DOSE OF IV ANTIBIOTICS ON 03/30/2020  Weekly CBCD and BMP while on IV antibiotics  You will be set up with an appointment in the outpatient Infectious Disease office  It is important for you to keep this appointment in order for us to monitor you while you are on IV antibiotics  This will include evaluating your lab work, examining your PICC line, and making sure you are not having toxicities or side effects of the medication(s) you are receiving    ------------------------------------------------------------------------------------------------------------    Podiatry Instructions:  · Keep dressing on the left foot clean, dry, and intact  Change dressing every other day with Xeroform, dry sterile dressing, Gabriel, and a light ACE wrap  · Please remain nonweightbearing on left lower extremity  · IV antibiotics has been set up by infectious disease and will be administered by visiting nurses  · Please follow up with Dr Faith Haque and Infectious disease as an outpatient for follow-up

## 2020-02-19 NOTE — ASSESSMENT & PLAN NOTE
Lab Results   Component Value Date    HGBA1C 6 6 28/78/8695     · DM2 complicated by ckd 3 and neuropathy  · Excellent A1C  · Inpatient blood sugars at goal  · HOLD metformin  · Substitute Lantus 15 units daily for insulin degludec while inpatient  · Avoid hypoglycemia

## 2020-02-19 NOTE — PROCEDURES
PICC Line Insertion  Date/Time: 2/19/2020 9:47 AM  Performed by: Nuha Small RN  Authorized by: Henri Mcduffie MD     Patient location:  Bedside  Consent:     Consent obtained:  Written    Consent given by:  Patient    Procedural risks discussed: conent obtained by physician  Avery protocol:     Procedure explained and questions answered to patient or proxy's satisfaction: yes      Relevant documents present and verified: yes      Test results available and properly labeled: yes      Radiology Images displayed and confirmed  If images not available, report reviewed: yes      Required blood products, implants, devices, and special equipment available: yes      Site/side marked: yes      Immediately prior to procedure, a time out was called: yes      Patient identity confirmed:  Verbally with patient, arm band, provided demographic data and hospital-assigned identification number  Pre-procedure details:     Hand hygiene: Hand hygiene performed prior to insertion      Sterile barrier technique: All elements of maximal sterile technique followed      Skin preparation:  ChloraPrep    Skin preparation agent: Skin preparation agent completely dried prior to procedure    Indications:     PICC line indications: long term antibiotics    Anesthesia (see MAR for exact dosages):      Anesthesia method:  Local infiltration (3ml)    Local anesthetic:  Lidocaine 1% w/o epi  Procedure details:     Location:  Basilic    Vessel type: vein      Laterality:  Right    Approach: percutaneous technique used      Patient position:  Flat    Procedural supplies:  Double lumen    Catheter size:  5 Fr    Landmarks identified: yes      Ultrasound guidance: yes      Sterile ultrasound techniques: Sterile gel and sterile probe covers were used      Number of attempts:  1    Successful placement: yes      Vessel of catheter tip end:  Chest Xray needed to confirm placement    Total catheter length (cm):  46    Catheter out on skin (cm):  0 Max flow rate:  999ml/hr    Arm circumference:  30cm  Post-procedure details:     Post-procedure:  Dressing applied and securement device placed    Assessment:  Blood return through all ports, free fluid flow and placement verified by x-ray (picc terminates SVC as per CXR)    Post-procedure complications: none      Patient tolerance of procedure:   Tolerated well, no immediate complications  Comments:      HUGH#[id number] 2020-12-31

## 2020-02-20 VITALS
RESPIRATION RATE: 18 BRPM | DIASTOLIC BLOOD PRESSURE: 68 MMHG | HEART RATE: 67 BPM | OXYGEN SATURATION: 94 % | WEIGHT: 205.25 LBS | BODY MASS INDEX: 29.45 KG/M2 | TEMPERATURE: 98.1 F | SYSTOLIC BLOOD PRESSURE: 147 MMHG

## 2020-02-20 LAB
ANION GAP SERPL CALCULATED.3IONS-SCNC: 9 MMOL/L (ref 4–13)
BUN SERPL-MCNC: 41 MG/DL (ref 5–25)
CALCIUM SERPL-MCNC: 9.2 MG/DL (ref 8.3–10.1)
CHLORIDE SERPL-SCNC: 108 MMOL/L (ref 100–108)
CO2 SERPL-SCNC: 24 MMOL/L (ref 21–32)
CREAT SERPL-MCNC: 1.59 MG/DL (ref 0.6–1.3)
ERYTHROCYTE [DISTWIDTH] IN BLOOD BY AUTOMATED COUNT: 15 % (ref 11.6–15.1)
GFR SERPL CREATININE-BSD FRML MDRD: 45 ML/MIN/1.73SQ M
GLUCOSE SERPL-MCNC: 94 MG/DL (ref 65–140)
GLUCOSE SERPL-MCNC: 95 MG/DL (ref 65–140)
HCT VFR BLD AUTO: 33.4 % (ref 36.5–49.3)
HGB BLD-MCNC: 10.5 G/DL (ref 12–17)
MCH RBC QN AUTO: 28.8 PG (ref 26.8–34.3)
MCHC RBC AUTO-ENTMCNC: 31.4 G/DL (ref 31.4–37.4)
MCV RBC AUTO: 92 FL (ref 82–98)
PLATELET # BLD AUTO: 314 THOUSANDS/UL (ref 149–390)
PMV BLD AUTO: 9.3 FL (ref 8.9–12.7)
POTASSIUM SERPL-SCNC: 4.4 MMOL/L (ref 3.5–5.3)
RBC # BLD AUTO: 3.64 MILLION/UL (ref 3.88–5.62)
SODIUM SERPL-SCNC: 141 MMOL/L (ref 136–145)
WBC # BLD AUTO: 7.48 THOUSAND/UL (ref 4.31–10.16)

## 2020-02-20 PROCEDURE — 85027 COMPLETE CBC AUTOMATED: CPT | Performed by: PODIATRIST

## 2020-02-20 PROCEDURE — 82948 REAGENT STRIP/BLOOD GLUCOSE: CPT

## 2020-02-20 PROCEDURE — 80048 BASIC METABOLIC PNL TOTAL CA: CPT | Performed by: PODIATRIST

## 2020-02-20 PROCEDURE — 94660 CPAP INITIATION&MGMT: CPT

## 2020-02-20 PROCEDURE — 99232 SBSQ HOSP IP/OBS MODERATE 35: CPT | Performed by: INTERNAL MEDICINE

## 2020-02-20 RX ADMIN — LOSARTAN POTASSIUM 25 MG: 25 TABLET, FILM COATED ORAL at 09:22

## 2020-02-20 RX ADMIN — CEFEPIME HYDROCHLORIDE 2000 MG: 2 INJECTION, POWDER, FOR SOLUTION INTRAVENOUS at 06:20

## 2020-02-20 RX ADMIN — FERROUS GLUCONATE 324 MG: 324 TABLET ORAL at 09:22

## 2020-02-20 RX ADMIN — ALLOPURINOL 300 MG: 100 TABLET ORAL at 09:22

## 2020-02-20 RX ADMIN — METOPROLOL TARTRATE 50 MG: 50 TABLET, FILM COATED ORAL at 09:22

## 2020-02-20 RX ADMIN — APIXABAN 5 MG: 5 TABLET, FILM COATED ORAL at 09:22

## 2020-02-20 RX ADMIN — INSULIN GLARGINE 15 UNITS: 100 INJECTION, SOLUTION SUBCUTANEOUS at 09:23

## 2020-02-20 RX ADMIN — FUROSEMIDE 40 MG: 40 TABLET ORAL at 09:22

## 2020-02-20 NOTE — PROGRESS NOTES
Progress Note - Podiatry  Jaquelinsalvatore Akhtar 75 y o  male MRN: [id number]  Unit/Bed#: E5 -01 Encounter: [id number]    Assessment:  1  S/P left partial 1st ray amputation (DOS: 2/10/20) with underlying residual OM  2  DM type 2 with neuropathy  3  Acute respiratory failure  4  Complete heart block  5  PAF  6  Acute diastolic congestive heart failure  7  CKD stage 3    Plan:  - left foot incision site appears stable with intact sutures with no wound dehiscence noted  Dressed with Adaptic and dry sterile dressings   -continue nonweightbearing to the left lower extremity  -continue IV antibiotics as per ID recommendations, IV Ancef till 3/30/20 for 6 weeks, PICC in place     -follow-up outpatient with Dr Rahat Gonsalez   -follow-up with Infectious Disease outpatient  -winston calixto for medical management  -patient is stable from all teams for discharge to home today  -appreciate case management VNA set up, as per CM VNA will start from Saturday and meantime patient's wife will administer antibiotics    Subjective/Objective   Chief Complaint: No chief complaint on file  Subjective: 75 y o  y/o male was seen and evaluated at bedside  Patient denies any acute events overnight  Denies nausea vomiting fever chills shortness of breath  Blood pressure 147/68, pulse 67, temperature 98 1 °F (36 7 °C), temperature source Temporal, resp  rate 18, weight 93 1 kg (205 lb 4 oz), SpO2 94 %  ,Body mass index is 29 45 kg/m²  Invasive Devices     Peripherally Inserted Central Catheter Line            PICC Line 60/37/54 Right Basilic 1 day                Physical Exam:   General: Alert, cooperative and no distress  Lungs: Non labored breathing  Heart: Positive S1, S2  Abdomen: Soft, non-tender  Extremity:  Left foot incision site appears stable with intact sutures  No skin edges dehiscence noted  Erythema has resolved  Mild edema noted to the left foot  Foot is warm to touch    Neurovascular musculoskeletal status at baseline  2/20      Lab, Imaging and other studies:   CBC:   Lab Results   Component Value Date    WBC 7 48 02/20/2020    HGB 10 5 (L) 02/20/2020    HCT 33 4 (L) 02/20/2020    MCV 92 02/20/2020     02/20/2020    MCH 28 8 02/20/2020    MCHC 31 4 02/20/2020    RDW 15 0 02/20/2020    MPV 9 3 02/20/2020   , CMP:   Lab Results   Component Value Date    SODIUM 141 02/20/2020    K 4 4 02/20/2020     02/20/2020    CO2 24 02/20/2020    BUN 41 (H) 02/20/2020    CREATININE 1 59 (H) 02/20/2020    CALCIUM 9 2 02/20/2020    EGFR 45 02/20/2020       Imaging: I have personally reviewed pertinent films in PACS  EKG, Pathology, and Other Studies: I have personally reviewed pertinent reports    VTE Pharmacologic Prophylaxis: Sequential compression device (Venodyne)

## 2020-02-20 NOTE — PLAN OF CARE
Problem: PAIN - ADULT  Goal: Verbalizes/displays adequate comfort level or baseline comfort level  Description  Interventions:  - Encourage patient to monitor pain and request assistance  - Assess pain using appropriate pain scale  - Administer analgesics based on type and severity of pain and evaluate response  - Implement non-pharmacological measures as appropriate and evaluate response  - Consider cultural and social influences on pain and pain management  - Notify physician/advanced practitioner if interventions unsuccessful or patient reports new pain  Outcome: Progressing     Problem: INFECTION - ADULT  Goal: Absence or prevention of progression during hospitalization  Description  INTERVENTIONS:  - Assess and monitor for signs and symptoms of infection  - Monitor lab/diagnostic results  - Monitor all insertion sites, i e  indwelling lines, tubes, and drains  - Monitor endotracheal if appropriate and nasal secretions for changes in amount and color  - [place] appropriate cooling/warming therapies per order  - Administer medications as ordered  - Instruct and encourage patient and family to use good hand hygiene technique  - Identify and instruct in appropriate isolation precautions for identified infection/condition  Outcome: Progressing     Problem: SAFETY ADULT  Goal: Patient will remain free of falls  Description  INTERVENTIONS:  - Assess patient frequently for physical needs  -  Identify cognitive and physical deficits and behaviors that affect risk of falls    -  [place] fall precautions as indicated by assessment   - Educate patient/family on patient safety including physical limitations  - Instruct patient to call for assistance with activity based on assessment  - Modify environment to reduce risk of injury  - Consider OT/PT consult to assist with strengthening/mobility  Outcome: Progressing  Goal: Maintain or return to baseline ADL function  Description  INTERVENTIONS:  -  Assess patient's ability to carry out ADLs; assess patient's baseline for ADL function and identify physical deficits which impact ability to perform ADLs (bathing, care of mouth/teeth, toileting, grooming, dressing, etc )  - Assess/evaluate cause of self-care deficits   - Assess range of motion  - Assess patient's mobility; develop plan if impaired  - Assess patient's need for assistive devices and provide as appropriate  - Encourage maximum independence but intervene and supervise when necessary  - Involve family in performance of ADLs  - Assess for home care needs following discharge   - Consider OT consult to assist with ADL evaluation and planning for discharge  - Provide patient education as appropriate  Outcome: Progressing  Goal: Maintain or return mobility status to optimal level  Description  INTERVENTIONS:  - Assess patient's baseline mobility status (ambulation, transfers, stairs, etc )    - Identify cognitive and physical deficits and behaviors that affect mobility  - Identify mobility aids required to assist with transfers and/or ambulation (gait belt, sit-to-stand, lift, walker, cane, etc )  - [place] fall precautions as indicated by assessment  - Record patient progress and toleration of activity level on Mobility SBAR; progress patient to next Phase/Stage  - Instruct patient to call for assistance with activity based on assessment  - Consider rehabilitation consult to assist with strengthening/weightbearing, etc   Outcome: Progressing     Problem: DISCHARGE PLANNING  Goal: Discharge to home or other facility with appropriate resources  Description  INTERVENTIONS:  - Identify barriers to discharge w/patient and caregiver  - Arrange for needed discharge resources and transportation as appropriate  - Identify discharge learning needs (meds, wound care, etc )  - Arrange for interpretive services to assist at discharge as needed  - Refer to Case Management Department for coordinating discharge planning if the patient needs post-hospital services based on physician/advanced practitioner order or complex needs related to functional status, cognitive ability, or social support system  Outcome: Progressing     Problem: Knowledge Deficit  Goal: Patient/family/caregiver demonstrates understanding of disease process, treatment plan, medications, and discharge instructions  Description  Complete learning assessment and assess knowledge base  Interventions:  - Provide teaching at level of understanding  - Provide teaching via preferred learning methods  Outcome: Progressing     Problem: Potential for Falls  Goal: Patient will remain free of falls  Description  INTERVENTIONS:  - Assess patient frequently for physical needs  -  Identify cognitive and physical deficits and behaviors that affect risk of falls    -  [place] fall precautions as indicated by assessment   - Educate patient/family on patient safety including physical limitations  - Instruct patient to call for assistance with activity based on assessment  - Modify environment to reduce risk of injury  - Consider OT/PT consult to assist with strengthening/mobility  Outcome: Progressing

## 2020-02-20 NOTE — NURSING NOTE
Reviewed dc instructions and care of Picc line pt and wife have no questions at this time  Pt belongings were packed  Pt walked off unit via wheelchair with belongings in stable condition accompanied by PCA and Spouse

## 2020-02-20 NOTE — PROGRESS NOTES
Progress Note - Infectious Disease   Cesar Webster 75 y o  male MRN: [id number]  Unit/Bed#: E5 -01 Encounter: [id number]      Impression/Plan:  1  Residual osteomyelitis of left 1st metatarsal stump after distal met head resection  Bone culture grew MDR E coli   Probability of cure with antibiotics alone for foot osteomyelitis is extremely low   Ideally, patient should undergo further bone resection, however, per discussion podiatry, there is concern for loss of stability and ambulation with further surgery  Therefore, we will attempt to treat issues residual osteomyelitis with prolonged IV antibiotic course   If patient fails this treatment course, he will need further bone resection, and would be at risk for foot loss  Fortunately the patient remains clinically and systemically well  He has no evidence of sepsis or systemic toxicity   He has no fever or leukocytosis  PICC line has been placed  He was started on IV cefepime and is tolerating antibiotics without difficulty  -continue IV cefepime through 3/30/2020 for a six week antibiotic treatment course  -weekly CBCD and BMP while on IV antibiotics  -PICC line must be discontinued after patient's final dose of IV antibiotics on 03/30/2020  -monitor vitals  -serial left foot exams  -local wound care per Podiatry  -continue close follow-up with Podiatry  -patient will follow-up with the outpatient infectious disease office on Tuesday, 03/03/2020 at 7:30am  -I dropped off scripts for antibiotics and lab work in patient's chart on 02/19/2020     2  CKD stage 3  Upon review of patient's past medical records it appears his baseline creatinine is approximately 1  Creatinine is 1 57 today   -monitor BMP  -dose adjust antibiotics for renal function as needed     3  Type 2 diabetes mellitus with neuropathy and long-term insulin use  Patient's last hemoglobin A1c was 6 4% on 01/07/2020    Recommend tight glycemic control for overall health, especially in the setting of infection   -blood glucose management per primary service     4  Status post left hallux amputation in January, followed by left partial 1st ray amputation in early February  With concern for residual osteomyelitis as above  He is following closely with Podiatry   -continue close follow-up with Podiatry    5  Chronic diastolic heart failure  In setting of aortic and mitral valve stenosis and complete heart block  He has a Medtronic dual chamber pacemaker in place      6  Paroxysmal AFib  Patient is on Eliquis  Patient is stable for discharge from ID standpoint  Greater than 30 minutes was spent at the bedside coordinating discharge, providing patient and spouse education, and answering questions  Above plan was discussed in detail with podiatry resident, Dr Clyde Suarez  Antibiotics:  Cefepime 2  Antibiotics 2    Subjective:  Patient reports he is feeling great  States he is ready to go home  He is having no pain in his left foot today and is relieved that the swelling and redness has resolved  He denies fever, chills, sweats, shakes; no nausea, vomiting, abdominal pain, diarrhea, or dysuria; no cough, shortness of breath, or chest pain  No new symptoms  Objective:  Vitals:  Temp:  [97 4 °F (36 3 °C)-98 5 °F (36 9 °C)] 98 1 °F (36 7 °C)  HR:  [61-96] 67  Resp:  [18] 18  BP: (147-164)/(68-80) 147/68  SpO2:  [94 %-96 %] 94 %  Temp (24hrs), Av °F (36 7 °C), Min:97 4 °F (36 3 °C), Max:98 5 °F (36 9 °C)  Current: Temperature: 98 1 °F (36 7 °C)    Physical Exam:   General Appearance:  Alert, interactive, nontoxic, no acute distress  Throat: Oropharynx moist without lesions  Lungs:   Clear to auscultation bilaterally; no wheezes, rhonchi or rales; respirations unlabored   Heart:  RRR; no murmur, rub or gallop   Abdomen:   Soft, non-tender, non-distended, positive bowel sounds       Extremities: No clubbing or cyanosis, no edema; trace patchy erythema present on the distal left shin, is not warm to touch  Skin: No new rashes, lesions, or draining wounds noted on exposed skin  Left foot dressing remains clean/dry/intact with overlying Ace, no breakthrough drainage  Labs, Imaging, & Other studies:   All pertinent labs and imaging studies were personally reviewed  Results from last 7 days   Lab Units 02/20/20  0623 02/19/20  0559 02/18/20  1616   WBC Thousand/uL 7 48 9 83 9 61   HEMOGLOBIN g/dL 10 5* 9 1* 8 8*   PLATELETS Thousands/uL 314 308 371     Results from last 7 days   Lab Units 02/20/20  0623  02/18/20  1616   POTASSIUM mmol/L 4 4   < > 4 9   CHLORIDE mmol/L 108   < > 105   CO2 mmol/L 24   < > 22   BUN mg/dL 41*   < > 46*   CREATININE mg/dL 1 59*   < > 1 75*   EGFR ml/min/1 73sq m 45   < > 40   CALCIUM mg/dL 9 2   < > 8 8   AST U/L  --   --  29   ALT U/L  --   --  14   ALK PHOS U/L  --   --  91    < > = values in this interval not displayed

## 2020-02-21 ENCOUNTER — OFFICE VISIT (OUTPATIENT)
Dept: FAMILY MEDICINE CLINIC | Facility: CLINIC | Age: 65
End: 2020-02-21
Payer: MEDICARE

## 2020-02-21 ENCOUNTER — TRANSITIONAL CARE MANAGEMENT (OUTPATIENT)
Dept: FAMILY MEDICINE CLINIC | Facility: CLINIC | Age: 65
End: 2020-02-21

## 2020-02-21 ENCOUNTER — TELEPHONE (OUTPATIENT)
Dept: INFECTIOUS DISEASES | Facility: CLINIC | Age: 65
End: 2020-02-21

## 2020-02-21 VITALS
TEMPERATURE: 97.5 F | HEART RATE: 80 BPM | RESPIRATION RATE: 16 BRPM | OXYGEN SATURATION: 98 % | SYSTOLIC BLOOD PRESSURE: 134 MMHG | DIASTOLIC BLOOD PRESSURE: 62 MMHG

## 2020-02-21 DIAGNOSIS — E78.2 MIXED HYPERLIPIDEMIA: ICD-10-CM

## 2020-02-21 DIAGNOSIS — M10.9 GOUT, UNSPECIFIED CAUSE, UNSPECIFIED CHRONICITY, UNSPECIFIED SITE: ICD-10-CM

## 2020-02-21 DIAGNOSIS — Z79.4 TYPE 2 DIABETES MELLITUS WITH STAGE 3 CHRONIC KIDNEY DISEASE, WITH LONG-TERM CURRENT USE OF INSULIN (HCC): ICD-10-CM

## 2020-02-21 DIAGNOSIS — E11.40 TYPE 2 DIABETES MELLITUS WITH DIABETIC NEUROPATHY, WITHOUT LONG-TERM CURRENT USE OF INSULIN (HCC): ICD-10-CM

## 2020-02-21 DIAGNOSIS — N18.30 TYPE 2 DIABETES MELLITUS WITH STAGE 3 CHRONIC KIDNEY DISEASE, WITH LONG-TERM CURRENT USE OF INSULIN (HCC): ICD-10-CM

## 2020-02-21 DIAGNOSIS — E11.22 TYPE 2 DIABETES MELLITUS WITH STAGE 3 CHRONIC KIDNEY DISEASE, WITH LONG-TERM CURRENT USE OF INSULIN (HCC): ICD-10-CM

## 2020-02-21 DIAGNOSIS — S98.112A AMPUTATION OF LEFT GREAT TOE (HCC): ICD-10-CM

## 2020-02-21 DIAGNOSIS — D64.9 ANEMIA: ICD-10-CM

## 2020-02-21 DIAGNOSIS — M86.9 OSTEOMYELITIS OF LEFT FOOT, UNSPECIFIED TYPE (HCC): Primary | ICD-10-CM

## 2020-02-21 PROCEDURE — 99496 TRANSJ CARE MGMT HIGH F2F 7D: CPT | Performed by: FAMILY MEDICINE

## 2020-02-21 RX ORDER — ALLOPURINOL 300 MG/1
300 TABLET ORAL EVERY MORNING
Qty: 90 TABLET | Refills: 2 | Status: SHIPPED | OUTPATIENT
Start: 2020-02-21 | End: 2020-11-22 | Stop reason: SDUPTHER

## 2020-02-21 RX ORDER — DOXYCYCLINE HYCLATE 50 MG/1
324 CAPSULE, GELATIN COATED ORAL
Qty: 30 TABLET | Refills: 0 | Status: SHIPPED | OUTPATIENT
Start: 2020-02-21 | End: 2020-03-04 | Stop reason: SDUPTHER

## 2020-02-21 NOTE — TELEPHONE ENCOUNTER
Called patient to confirm follow up appt schedule for 3/3  Faxed weekly labs to [facility]

## 2020-02-21 NOTE — PROGRESS NOTES
Assessment/Plan:  Patient to continue present treatment  Patient to follow up with specialists as scheduled and return to the office in 3 months  Diagnoses and all orders for this visit:    Osteomyelitis of left foot, unspecified type ([facility], [place] [facility] )    Amputation of left great toe ([place] [address] )    Type 2 diabetes mellitus with stage 3 chronic kidney disease, with long-term current use of insulin ([place] [address] )    Type 2 diabetes mellitus with diabetic neuropathy, without long-term current use of insulin ([facility])    Gout, unspecified cause, unspecified chronicity, unspecified site  -     allopurinol (ZYLOPRIM) 300 mg tablet; Take 1 tablet (300 mg total) by mouth every morning    Anemia  -     ferrous gluconate (FERGON) 324 mg tablet; Take 1 tablet (324 mg total) by mouth 2 (two) times a day before meals          Subjective:      Patient ID: Carlitos Titus is a 75 y o  male  Patient is being seen in follow-up from recent hospitalizations at [facility] from 02/07/2020 until 02/13/2020 and then again on 02/18/2020 until 02/20/2020 for left foot osteomyelitis status post several procedures including left great toe amputation  Patient was discharged home on IV antibiotics with a PICC line has a follow-up appoint with infectious disease on 03/03/2020 with CBC and BMP ordered prior to appointment  Patient saw Dr Lyla Nielson, podiatrist today and has a follow-up appoint with her on 02/27/2020  He has a follow-up appoint with Nephrology on 03/12/2020  Patient has been feeling well overall and is nonweightbearing in a transport chair  Home glucose monitoring reveals fasting blood sugars   Appetite is good and patient is sleeping fairly well overall  He denies fever, chills or sweats        The following portions of the patient's history were reviewed and updated as appropriate: allergies, current medications, past family history, past medical history, past social history, past surgical history and problem list     Review of Systems   Constitutional: Positive for activity change  Negative for appetite change, chills, diaphoresis, fatigue, fever and unexpected weight change  HENT: Negative  Eyes: Negative  Respiratory: Negative for cough, chest tightness, shortness of breath and wheezing  Cardiovascular: Positive for leg swelling  Negative for chest pain and palpitations  Gastrointestinal: Negative for abdominal pain, blood in stool, constipation, diarrhea, nausea and vomiting  Endocrine: Negative for cold intolerance and heat intolerance  Genitourinary: Negative for difficulty urinating, dysuria, frequency and hematuria  Musculoskeletal: Negative for arthralgias, back pain, gait problem, joint swelling, myalgias, neck pain and neck stiffness  Skin: Positive for rash  Neurological: Negative for dizziness, syncope, weakness, light-headedness and headaches  Hematological: Negative for adenopathy  Bruises/bleeds easily  Psychiatric/Behavioral: Positive for dysphoric mood  Negative for decreased concentration and sleep disturbance  The patient is not nervous/anxious  Objective:      /62   Pulse 80   Temp 97 5 °F (36 4 °C) (Tympanic)   Resp 16   SpO2 98%          Physical Exam   Constitutional: He is oriented to person, place, and time  He appears well-developed and well-nourished  No distress  HENT:   Head: Normocephalic  Right Ear: External ear normal    Left Ear: External ear normal    Nose: Nose normal    Mouth/Throat: Oropharynx is clear and moist    Eyes: Conjunctivae are normal  No scleral icterus  Neck: Neck supple  No thyromegaly present  Cardiovascular: Normal rate and regular rhythm  Pulmonary/Chest: Effort normal and breath sounds normal    Abdominal: Soft  There is no tenderness  Musculoskeletal: He exhibits edema  Minimal pretibial edema bilaterally  Lymphadenopathy:     He has no cervical adenopathy     Neurological: He is alert and oriented to person, place, and time    Skin: Skin is warm and dry  Psychiatric: He has a normal mood and affect

## 2020-02-23 RX ORDER — SIMVASTATIN 20 MG
20 TABLET ORAL
Qty: 90 TABLET | Refills: 3 | Status: SHIPPED | OUTPATIENT
Start: 2020-02-23 | End: 2020-11-24 | Stop reason: SDUPTHER

## 2020-02-24 ENCOUNTER — TELEPHONE (OUTPATIENT)
Dept: INFECTIOUS DISEASES | Facility: CLINIC | Age: 65
End: 2020-02-24

## 2020-02-24 DIAGNOSIS — N18.30 STAGE 3 CHRONIC KIDNEY DISEASE (HCC): Primary | ICD-10-CM

## 2020-02-24 DIAGNOSIS — I70.1 LEFT RENAL ARTERY STENOSIS (HCC): ICD-10-CM

## 2020-02-24 DIAGNOSIS — I10 ESSENTIAL HYPERTENSION: ICD-10-CM

## 2020-02-24 RX ORDER — LOSARTAN POTASSIUM 25 MG/1
25 TABLET ORAL DAILY
Qty: 90 TABLET | Refills: 3 | Status: SHIPPED | OUTPATIENT
Start: 2020-02-24 | End: 2020-03-03

## 2020-02-24 NOTE — TELEPHONE ENCOUNTER
----- Message from Shaina Toribio sent at 2020  6:08 PM EST -----  Regarding: Non-Urgent Medical Question  Contact: [phone]  Dr Ragini oTribio  51 needs a refill on his Losartan Potassium 25 mg tablets, take 1 in the AM  Please submit the refill to his pharmacy on file, [facility]  Thank you, Brennan Miller's wife    [phone]

## 2020-02-24 NOTE — TELEPHONE ENCOUNTER
Left message for patient to call back and offered if he would like to come in at 8:30am on 3/3 instead of 7:30am

## 2020-02-27 ENCOUNTER — DOCUMENTATION (OUTPATIENT)
Dept: INFECTIOUS DISEASES | Facility: CLINIC | Age: 65
End: 2020-02-27

## 2020-02-27 NOTE — PROGRESS NOTES
Notified Rachel Sandy of pt's increased lab values  Also routed labs to her in basket from 2/26  She will review and address with pt at appt on Tues

## 2020-02-28 ENCOUNTER — IN-CLINIC DEVICE VISIT (OUTPATIENT)
Dept: CARDIOLOGY CLINIC | Facility: CLINIC | Age: 65
End: 2020-02-28
Payer: MEDICARE

## 2020-02-28 DIAGNOSIS — Z95.0 PRESENCE OF PERMANENT CARDIAC PACEMAKER: Primary | ICD-10-CM

## 2020-02-28 DIAGNOSIS — I10 ESSENTIAL HYPERTENSION: ICD-10-CM

## 2020-02-28 PROCEDURE — 93280 PM DEVICE PROGR EVAL DUAL: CPT | Performed by: INTERNAL MEDICINE

## 2020-02-28 RX ORDER — METOPROLOL TARTRATE 50 MG/1
50 TABLET, FILM COATED ORAL EVERY 12 HOURS SCHEDULED
Qty: 180 TABLET | Refills: 3 | Status: SHIPPED | OUTPATIENT
Start: 2020-02-28 | End: 2020-03-22

## 2020-02-28 NOTE — PROGRESS NOTES
Results for orders placed or performed in visit on 02/28/20   Cardiac EP device report    Narrative    MDT-DUAL CHAMBER PPM (AAIR-DDDR MODE)  DEVICE INTERROGATED IN THE [facility] OFFICE  BATTERY VOLTAGE ADEQUATE  (10 9 YRS) AP 41%  100%  ALL LEAD PARAMETERS WITHIN NORMAL LIMITS  1 AF EPISODE DETECTED (2% OF TIME)  PATIENT IS ON ELIQUIS  NO PROGRAMMING CHANGES MADE TO DEVICE PARAMETERS  NORMAL DEVICE FUNCTION  ---MEDINA

## 2020-03-03 ENCOUNTER — OFFICE VISIT (OUTPATIENT)
Dept: INFECTIOUS DISEASES | Facility: CLINIC | Age: 65
End: 2020-03-03
Payer: MEDICARE

## 2020-03-03 VITALS
SYSTOLIC BLOOD PRESSURE: 134 MMHG | BODY MASS INDEX: 28.29 KG/M2 | HEIGHT: 70 IN | TEMPERATURE: 97.9 F | DIASTOLIC BLOOD PRESSURE: 62 MMHG | HEART RATE: 78 BPM | WEIGHT: 197.6 LBS

## 2020-03-03 DIAGNOSIS — I70.1 LEFT RENAL ARTERY STENOSIS (HCC): ICD-10-CM

## 2020-03-03 DIAGNOSIS — I10 ESSENTIAL HYPERTENSION: ICD-10-CM

## 2020-03-03 DIAGNOSIS — E11.40 TYPE 2 DIABETES MELLITUS WITH DIABETIC NEUROPATHY, WITHOUT LONG-TERM CURRENT USE OF INSULIN (HCC): ICD-10-CM

## 2020-03-03 DIAGNOSIS — N18.30 STAGE 3 CHRONIC KIDNEY DISEASE (HCC): ICD-10-CM

## 2020-03-03 DIAGNOSIS — Z16.24 MULTIPLE DRUG RESISTANT ORGANISM (MDRO) CULTURE POSITIVE: ICD-10-CM

## 2020-03-03 DIAGNOSIS — M86.9 OSTEOMYELITIS OF LEFT FOOT, UNSPECIFIED TYPE (HCC): Primary | ICD-10-CM

## 2020-03-03 DIAGNOSIS — S98.112A AMPUTATION OF LEFT GREAT TOE (HCC): ICD-10-CM

## 2020-03-03 PROCEDURE — 3044F HG A1C LEVEL LT 7.0%: CPT | Performed by: NURSE PRACTITIONER

## 2020-03-03 PROCEDURE — 3075F SYST BP GE 130 - 139MM HG: CPT | Performed by: NURSE PRACTITIONER

## 2020-03-03 PROCEDURE — 3066F NEPHROPATHY DOC TX: CPT | Performed by: NURSE PRACTITIONER

## 2020-03-03 PROCEDURE — 3008F BODY MASS INDEX DOCD: CPT | Performed by: NURSE PRACTITIONER

## 2020-03-03 PROCEDURE — 1111F DSCHRG MED/CURRENT MED MERGE: CPT | Performed by: NURSE PRACTITIONER

## 2020-03-03 PROCEDURE — 99214 OFFICE O/P EST MOD 30 MIN: CPT | Performed by: NURSE PRACTITIONER

## 2020-03-03 PROCEDURE — 3078F DIAST BP <80 MM HG: CPT | Performed by: NURSE PRACTITIONER

## 2020-03-03 PROCEDURE — 1160F RVW MEDS BY RX/DR IN RCRD: CPT | Performed by: NURSE PRACTITIONER

## 2020-03-03 PROCEDURE — 1036F TOBACCO NON-USER: CPT | Performed by: NURSE PRACTITIONER

## 2020-03-03 PROCEDURE — 4040F PNEUMOC VAC/ADMIN/RCVD: CPT | Performed by: NURSE PRACTITIONER

## 2020-03-03 PROCEDURE — 2022F DILAT RTA XM EVC RTNOPTHY: CPT | Performed by: NURSE PRACTITIONER

## 2020-03-03 RX ORDER — OLMESARTAN MEDOXOMIL 5 MG/1
TABLET ORAL
Qty: 90 TABLET | Refills: 3 | Status: SHIPPED | OUTPATIENT
Start: 2020-03-03 | End: 2020-03-12

## 2020-03-03 NOTE — ASSESSMENT & PLAN NOTE
Residual osteomyelitis of left 1st metatarsal stump after distal met head resection  Bone culture grew MDR E coli   Probability of cure with antibiotics alone for foot osteomyelitis is extremely low   Ideally, patient should undergo further bone resection, however, per discussion with podiatry with inpatient, there is concern for loss of stability and ambulation with further surgery  Therefore, we are attempting to treat issues residual osteomyelitis with prolonged IV antibiotic course  He has a RUE PICC intact and continues to receive IV Cefepime which is scheduled through 3/30/2020 for a 6-week treatment course  He is tolerating antibiotics without difficulty  WBC count and renal function remain stable

## 2020-03-03 NOTE — ASSESSMENT & PLAN NOTE
Patient's L foot bone culture collected on 2/10/2020 was positive for MDR e coli  He is currently undergoing IV Cefepime treatment through 3/30/2020

## 2020-03-03 NOTE — ASSESSMENT & PLAN NOTE
Patient's last hemoglobin A1c was 6 4% on 01/07/2020  Recommend tight glycemic control for overall health, especially in the setting of infection

## 2020-03-03 NOTE — ASSESSMENT & PLAN NOTE
Patient following as an outpatient with Dr Link May  He is completing weekly BMP while on IV cefepime  His recent creatinine was 1 6 which was improved over previous reading  Will continue to monitor weekly BMP and will notify patient directly if renal function worsens

## 2020-03-03 NOTE — ASSESSMENT & PLAN NOTE
Patient's initial L hallux amputation occurred on 1/8/2020  He required a second trip back to the OR on 1/12/2020 for further washout and resection of the sesamoid bone  He returned to the hospital for partial ray amputation on 2/10/2020 after experiencing wound dehiscence  He has residual osteomyelitis in the foot as documented above  There is low likelihood of clearance of the osteomyelitis with antibiotic treatment along, however further resection will likely cause instability of the foot and loss of ability to ambulate  We are therefore treating with antibiotics as above

## 2020-03-03 NOTE — PATIENT INSTRUCTIONS
Continue weekly CBCD and BMP  Recommend patient contact his PCP to address his prescription iron pills and to see if PCP would like another iron panel checked  Patient will get photograph of his wound during his next podiatry appointment  Patient will return in 2 weeks for additional follow up

## 2020-03-04 DIAGNOSIS — D64.9 ANEMIA: ICD-10-CM

## 2020-03-04 RX ORDER — DOXYCYCLINE HYCLATE 50 MG/1
324 CAPSULE, GELATIN COATED ORAL
Qty: 30 TABLET | Refills: 2 | Status: SHIPPED | OUTPATIENT
Start: 2020-03-04 | End: 2020-04-20 | Stop reason: SDUPTHER

## 2020-03-05 ENCOUNTER — APPOINTMENT (OUTPATIENT)
Dept: LAB | Facility: CLINIC | Age: 65
End: 2020-03-05
Payer: MEDICARE

## 2020-03-05 DIAGNOSIS — N18.30 STAGE 3 CHRONIC KIDNEY DISEASE (HCC): ICD-10-CM

## 2020-03-05 LAB
ALBUMIN SERPL BCP-MCNC: 3 G/DL (ref 3.5–5)
ANION GAP SERPL CALCULATED.3IONS-SCNC: 8 MMOL/L (ref 4–13)
BASOPHILS # BLD AUTO: 0.01 THOUSANDS/ΜL (ref 0–0.1)
BASOPHILS NFR BLD AUTO: 0 % (ref 0–1)
BILIRUB UR QL STRIP: NEGATIVE
BUN SERPL-MCNC: 72 MG/DL (ref 5–25)
CALCIUM SERPL-MCNC: 9.5 MG/DL (ref 8.3–10.1)
CHLORIDE SERPL-SCNC: 110 MMOL/L (ref 100–108)
CLARITY UR: CLEAR
CO2 SERPL-SCNC: 17 MMOL/L (ref 21–32)
COLOR UR: YELLOW
CREAT SERPL-MCNC: 1.92 MG/DL (ref 0.6–1.3)
CREAT UR-MCNC: 83.3 MG/DL
EOSINOPHIL # BLD AUTO: 1.51 THOUSAND/ΜL (ref 0–0.61)
EOSINOPHIL NFR BLD AUTO: 13 % (ref 0–6)
ERYTHROCYTE [DISTWIDTH] IN BLOOD BY AUTOMATED COUNT: 15.8 % (ref 11.6–15.1)
GFR SERPL CREATININE-BSD FRML MDRD: 35 ML/MIN/1.73SQ M
GLUCOSE P FAST SERPL-MCNC: 100 MG/DL (ref 65–99)
GLUCOSE UR STRIP-MCNC: NEGATIVE MG/DL
HCT VFR BLD AUTO: 32.9 % (ref 36.5–49.3)
HGB BLD-MCNC: 10.5 G/DL (ref 12–17)
HGB UR QL STRIP.AUTO: ABNORMAL
IMM GRANULOCYTES # BLD AUTO: 0.05 THOUSAND/UL (ref 0–0.2)
IMM GRANULOCYTES NFR BLD AUTO: 0 % (ref 0–2)
KETONES UR STRIP-MCNC: NEGATIVE MG/DL
LEUKOCYTE ESTERASE UR QL STRIP: NEGATIVE
LYMPHOCYTES # BLD AUTO: 1.1 THOUSANDS/ΜL (ref 0.6–4.47)
LYMPHOCYTES NFR BLD AUTO: 10 % (ref 14–44)
MAGNESIUM SERPL-MCNC: 2.4 MG/DL (ref 1.6–2.6)
MCH RBC QN AUTO: 28.8 PG (ref 26.8–34.3)
MCHC RBC AUTO-ENTMCNC: 31.9 G/DL (ref 31.4–37.4)
MCV RBC AUTO: 90 FL (ref 82–98)
MONOCYTES # BLD AUTO: 0.77 THOUSAND/ΜL (ref 0.17–1.22)
MONOCYTES NFR BLD AUTO: 7 % (ref 4–12)
NEUTROPHILS # BLD AUTO: 7.85 THOUSANDS/ΜL (ref 1.85–7.62)
NEUTS SEG NFR BLD AUTO: 70 % (ref 43–75)
NITRITE UR QL STRIP: NEGATIVE
NRBC BLD AUTO-RTO: 0 /100 WBCS
PH UR STRIP.AUTO: 6 [PH]
PHOSPHATE SERPL-MCNC: 4.3 MG/DL (ref 2.3–4.1)
PLATELET # BLD AUTO: 262 THOUSANDS/UL (ref 149–390)
PMV BLD AUTO: 10.4 FL (ref 8.9–12.7)
POTASSIUM SERPL-SCNC: 4.6 MMOL/L (ref 3.5–5.3)
PROT UR STRIP-MCNC: ABNORMAL MG/DL
PROT UR-MCNC: 772 MG/DL
PROT/CREAT UR: 9.27 MG/G{CREAT} (ref 0–0.1)
PTH-INTACT SERPL-MCNC: 46.5 PG/ML (ref 18.4–80.1)
RBC # BLD AUTO: 3.64 MILLION/UL (ref 3.88–5.62)
SODIUM SERPL-SCNC: 135 MMOL/L (ref 136–145)
SP GR UR STRIP.AUTO: 1.02 (ref 1–1.03)
URATE SERPL-MCNC: 3.9 MG/DL (ref 4.2–8)
UROBILINOGEN UR QL STRIP.AUTO: 0.2 E.U./DL
WBC # BLD AUTO: 11.29 THOUSAND/UL (ref 4.31–10.16)

## 2020-03-05 PROCEDURE — 82570 ASSAY OF URINE CREATININE: CPT | Performed by: INTERNAL MEDICINE

## 2020-03-05 PROCEDURE — 80069 RENAL FUNCTION PANEL: CPT

## 2020-03-05 PROCEDURE — 84550 ASSAY OF BLOOD/URIC ACID: CPT

## 2020-03-05 PROCEDURE — 83970 ASSAY OF PARATHORMONE: CPT

## 2020-03-05 PROCEDURE — 84156 ASSAY OF PROTEIN URINE: CPT | Performed by: INTERNAL MEDICINE

## 2020-03-05 PROCEDURE — 83735 ASSAY OF MAGNESIUM: CPT

## 2020-03-05 PROCEDURE — 85025 COMPLETE CBC W/AUTO DIFF WBC: CPT

## 2020-03-05 PROCEDURE — 81001 URINALYSIS AUTO W/SCOPE: CPT | Performed by: INTERNAL MEDICINE

## 2020-03-05 PROCEDURE — 36415 COLL VENOUS BLD VENIPUNCTURE: CPT

## 2020-03-06 LAB
BACTERIA UR QL AUTO: NORMAL /HPF
NON-SQ EPI CELLS URNS QL MICRO: NORMAL /HPF
RBC #/AREA URNS AUTO: NORMAL /HPF
WBC #/AREA URNS AUTO: NORMAL /HPF

## 2020-03-10 ENCOUNTER — TELEPHONE (OUTPATIENT)
Dept: OTHER | Facility: HOSPITAL | Age: 65
End: 2020-03-10

## 2020-03-10 ENCOUNTER — DOCUMENTATION (OUTPATIENT)
Dept: INFECTIOUS DISEASES | Facility: CLINIC | Age: 65
End: 2020-03-10

## 2020-03-10 NOTE — TELEPHONE ENCOUNTER
I reviewed patient's lab work which showed increase in patient's creatinine to 2 72  Patient will require a change in his Cefepime dosing  Will stop 2g q12 hours dosing and change to 2g q24 hours dosing  Reviewed decision with ID attending, Dr Janell Hi  I notified Counts include [address] beds at the [facility] pharmacist, [facility], of change in dosing  I also notified patient of change in dosing  He already received a dose today and will not be taking the dose due later tonight  Patient is scheduled for nephrology follow up on 3/12/2020 and I stressed to patient the importance of maintaining this appointment  He verbalized understanding

## 2020-03-11 ENCOUNTER — TELEPHONE (OUTPATIENT)
Dept: OTHER | Facility: HOSPITAL | Age: 65
End: 2020-03-11

## 2020-03-11 NOTE — TELEPHONE ENCOUNTER
17:07 - Spoke to patient and his wife as she had ongoing concerns with the dosing of his antibiotic  She is very concerned that he is not getting enough medication  I re-educated patient and wife about drug levels in the blood stream lasting longer due to increase in creatinine  I discussed that higher dosing could lead to drug toxicities  Patient has scheduled appointment with his nephrologist tomorrow at which time she can further discuss his increased creatinine  I will not be adjusting his antibiotic dosing again at this time  She also requested I place order to check patient's iron as PCP did not want to place order  She is concerned about his hemoglobin  I discussed with patient and his wife that I do not manage his supplements and cannot order an iron panel at this time  I reviewed his recent CBC results with patient which showed his hemoglobin is stable  Patient's wife very unhappy that it is still "low" but I continued to stress that it hasn't been dropping and that he is stable at this time  PCP had noted patient should continue his iron supplement, I reinforced this with her  She also asked me about additional precautions her  needs to take for [place]  I told patient and his wife that at this time I have no additional recommendations for him other than the ongoing CDC recommendations for keeping up with good hand hygiene and avoidance of sick contacts  Informed patient and his wife that patient's nephrologist and PCP can reach out to me at anytime via intrahospital paging/tiger text if they have questions  I will see patient back in the office next week on 3/17/2020 at 8:30 am  Patient and his wife verbalized understanding

## 2020-03-12 ENCOUNTER — OFFICE VISIT (OUTPATIENT)
Dept: NEPHROLOGY | Facility: CLINIC | Age: 65
End: 2020-03-12
Payer: MEDICARE

## 2020-03-12 VITALS
HEART RATE: 77 BPM | HEIGHT: 70 IN | BODY MASS INDEX: 28.35 KG/M2 | DIASTOLIC BLOOD PRESSURE: 59 MMHG | SYSTOLIC BLOOD PRESSURE: 140 MMHG | WEIGHT: 198 LBS

## 2020-03-12 DIAGNOSIS — N17.9 AKI (ACUTE KIDNEY INJURY) (HCC): Primary | ICD-10-CM

## 2020-03-12 DIAGNOSIS — I50.31 ACUTE DIASTOLIC (CONGESTIVE) HEART FAILURE (HCC): ICD-10-CM

## 2020-03-12 DIAGNOSIS — N18.9 CKD (CHRONIC KIDNEY DISEASE): ICD-10-CM

## 2020-03-12 PROBLEM — E11.21 DIABETIC KIDNEY DISEASE (HCC): Status: ACTIVE | Noted: 2020-03-12

## 2020-03-12 PROCEDURE — 3044F HG A1C LEVEL LT 7.0%: CPT | Performed by: INTERNAL MEDICINE

## 2020-03-12 PROCEDURE — 3066F NEPHROPATHY DOC TX: CPT | Performed by: INTERNAL MEDICINE

## 2020-03-12 PROCEDURE — 3077F SYST BP >= 140 MM HG: CPT | Performed by: INTERNAL MEDICINE

## 2020-03-12 PROCEDURE — 4040F PNEUMOC VAC/ADMIN/RCVD: CPT | Performed by: INTERNAL MEDICINE

## 2020-03-12 PROCEDURE — 1036F TOBACCO NON-USER: CPT | Performed by: INTERNAL MEDICINE

## 2020-03-12 PROCEDURE — 99214 OFFICE O/P EST MOD 30 MIN: CPT | Performed by: INTERNAL MEDICINE

## 2020-03-12 PROCEDURE — 1111F DSCHRG MED/CURRENT MED MERGE: CPT | Performed by: INTERNAL MEDICINE

## 2020-03-12 PROCEDURE — 1160F RVW MEDS BY RX/DR IN RCRD: CPT | Performed by: INTERNAL MEDICINE

## 2020-03-12 PROCEDURE — 3008F BODY MASS INDEX DOCD: CPT | Performed by: INTERNAL MEDICINE

## 2020-03-12 PROCEDURE — 3078F DIAST BP <80 MM HG: CPT | Performed by: INTERNAL MEDICINE

## 2020-03-12 PROCEDURE — 2022F DILAT RTA XM EVC RTNOPTHY: CPT | Performed by: INTERNAL MEDICINE

## 2020-03-12 RX ORDER — FUROSEMIDE 40 MG/1
40 TABLET ORAL DAILY
Refills: 0
Start: 2020-03-12 | End: 2020-03-22

## 2020-03-12 NOTE — PATIENT INSTRUCTIONS
Graeme Gilbert, your here today for follow-up regarding your kidney function:  1   Year creatinine did increase to 2 7 year baseline creatinine is around 1 6 at this time we can do the following  -please hold furosemide for the next 2 days and start furosemide 40 mg daily on Sunday  -please hold losartan for the next 2 days and restart 25 mg on Sunday  -repeat blood work and urine test has been ordered your antibiotics were also decreased   -we will call you with the results next week regarding your blood work and we will make further recommendations at that time

## 2020-03-12 NOTE — LETTER
March 12, 2020     Merlyn Villeda, [address] [facility] [place] [address] [id number]    Patient: Huber Patel   YOB: 1955   Date of Visit: 3/12/2020       Dear Dr Cony Newsome: Thank you for referring Huber Patel to me for evaluation  Below are my notes for this consultation  If you have questions, please do not hesitate to call me  I look forward to following your patient along with you           Sincerely,        Maricarmen Lopes DO        CC: No Recipients  Maricarmen Lopes DO  3/12/2020 10:34 AM  Incomplete  OFFICE follow-up- Nephrology   Huber Patel 75 y o  male MRN: [id number]    Encounter: [id number]          ASSESSMENT and PLAN:  Yariel Menard was seen today for an initial consultation    He is 75years old with a past medical history of type 2 diabetes mellitus times 20 years, hypertension, gout  hyperlipidemia proteinuria and diabetic foot ulcer presents for follow-up    Diagnoses and all orders for this visit:    Essential (primary) hypertension  - blood pressures at home slightly higher now but less than 140 over 80  - he is currently on amlodipine 10 mg daily, metoprolol 50 mg twice daily, losartan 25 mg daily  -secondary workup included a repeat renal artery ultrasound which does show a left renal artery stenosis  - his echocardiogram was reviewed as well he should have further follow-up with cardiology to monitor his valvular heart disease- he does have LVH so blood pressure control is very important and this was discussed in detail with him in past    Acute kidney injury on Diabetic kidney disease and persistent proteinuria  - in the setting of prolonged diabetes  -serologic workup in past has been negative  -he is currently on long-term cefepime for another 3 weeks, his most recent blood work shows his creatinine increased to 2 7 his baseline creatinine is 1 6-2 0  -hold furosemide for the next 2 days and start furosemide 40 mg daily on Sunday  -hold losartan for the next 2 days and start losartan 25 mg daily on Sunday  -repeat a renal function panel, urinalysis, CBC, and urinalysis and urine eosinophil on Monday    Nephropathy-diabetic kidney disease  -serologies negative will monitor at this point    Gout  - on allopurinol 300 mg daily  - has been gout free, will monitor and continue    Bilateral leg edema  -now improved significantly decrease furosemide does    Left renal artery stenosis and history of diastolic heart failure  -renal artery Doppler showed 60% stenosis  -can be indication and cause of flash pulmonary edema  -mitral valve disease also present    Left foot osteomyelitis, peripheral vascular disease  -status post amputation now on 6 weeks of cefepime    Repeat blood work Monday, further adjustments will be made follow-up in 2 months to ensure stability    Patient Instructions   Simón Plummer, your here today for follow-up regarding your kidney function:  1  Year creatinine did increase to 2 7 year baseline creatinine is around 1 6 at this time we can do the following  -please hold furosemide for the next 2 days and start furosemide 40 mg daily on Sunday  -please hold losartan for the next 2 days and restart 25 mg on Sunday  -repeat blood work and urine test has been ordered your antibiotics were also decreased   -we will call you with the results next week regarding your blood work and we will make further recommendations at that time      HPI:  Carey Calderon is a 75 y o male who was referred by Dr Devine ref   provider found for evaluation of  Initial evaluation    He is 75years old and has a history of type 2 diabetes mellitus currently on oral medications stage IIIA to 2 chronic kidney disease mitral valve prolapse,    Was recently hospitalized for a amputation of his toe, he is now requiring long-term antibiotics this seems to be improving his infection and osteomyelitis, at home he has been doing well is weight is down his no edema his blood pressures have been stable he denies any chest pain or shortness of breath fevers chills nausea vomiting diarrhea constipation no foamy or bloody urine      ROS: All the systems were reviewed and were negative except as documented on the HPI  Allergies: Timmothy Winooski; Lamisil [terbinafine]; Lamisil [terbinafine];  Other; and Latex    Medications:   Current Outpatient Medications:     allopurinol (ZYLOPRIM) 300 mg tablet, Take 1 tablet (300 mg total) by mouth every morning, Disp: 90 tablet, Rfl: 2    amLODIPine (NORVASC) 10 mg tablet, TAKE 1 TABLET BY MOUTH EVERY DAY, Disp: 90 tablet, Rfl: 3    apixaban (ELIQUIS) 5 mg, Take 1 tablet (5 mg total) by mouth every 12 (twelve) hours, Disp: 60 tablet, Rfl: 11    betamethasone valerate (VALISONE) 0 1 % cream, as needed , Disp: , Rfl: 3    Cholecalciferol (VITAMIN D-3) 1000 units CAPS, Take 1 capsule by mouth every morning  , Disp: , Rfl:     Dupilumab, Asthma, (DUPIXENT SC), Inject under the skin , Disp: , Rfl:     ferrous gluconate (FERGON) 324 mg tablet, Take 1 tablet (324 mg total) by mouth 2 (two) times a day before meals, Disp: 30 tablet, Rfl: 2    furosemide (LASIX) 40 mg tablet, Take 1 tablet (40 mg total) by mouth daily, Disp: , Rfl: 0    glucagon (GLUCAGON EMERGENCY) 1 MG injection, Inject 1 mg under the skin once as needed for low blood sugar for up to 1 dose, Disp: 1 kit, Rfl: 2    glucose blood (ONE TOUCH ULTRA TEST) test strip, 1 each by Other route 3 (three) times a day Use to test blood sugar, Disp: 130 each, Rfl: 6    hydrOXYzine HCL (ATARAX) 10 mg tablet, Take 10 mg by mouth every 6 (six) hours as needed  , Disp: , Rfl:     Insulin Degludec-Liraglutide (Insulin Degludec-Liraglutide) 100 units-3 6 mg/mL injection pen, Inject 19 Units under the skin daily, Disp: , Rfl:     LORazepam (ATIVAN) 0 5 mg tablet, Take 1 tablet (0 5 mg total) by mouth every 8 (eight) hours as needed for anxiety, Disp: 60 tablet, Rfl: 2    losartan (COZAAR) 25 mg tablet, Take 1 tablet (25 mg total) by mouth daily, Disp: , Rfl:     metFORMIN (GLUCOPHAGE) 500 mg tablet, Take 1 tablet (500 mg total) by mouth 2 (two) times a day with meals, Disp: 360 tablet, Rfl: 3    metoprolol tartrate (LOPRESSOR) 50 mg tablet, Take 1 tablet (50 mg total) by mouth every 12 (twelve) hours, Disp: 180 tablet, Rfl: 3    Misc   Devices (FREE SPIRIT KNEE/LEG WALKER) MISC, by Does not apply route 3 (three) times a day, Disp: 1 each, Rfl: 0    Multiple Vitamin (MULTIVITAMIN) tablet, Take 1 tablet by mouth daily IN AM, Disp: , Rfl:     omega-3-acid ethyl esters (LOVAZA) 1 g capsule, Take 2 capsules (2 g total) by mouth 2 (two) times a day, Disp: 360 capsule, Rfl: 3    simvastatin (ZOCOR) 20 mg tablet, Take 1 tablet (20 mg total) by mouth daily at bedtime, Disp: 90 tablet, Rfl: 3    tamsulosin (FLOMAX) 0 4 mg, Take 0 4 mg by mouth daily with dinner, Disp: , Rfl: 3    ONE TOUCH ULTRA TEST test strip, USE TO TEST BLOOD SUGAR 3 TIMES A DAY, Disp: 100 each, Rfl: 3    Past Medical History:   Diagnosis Date    Arthritis     OA    Bruise of both arms     forearms and both hands    Bruises easily     CHF (congestive heart failure) (HCC)     Diabetes mellitus (HCC)     Diabetic foot ulcer (HCC)     Eczema     Erectile dysfunction     Gout     Hyperlipidemia     Hypertension     Murmur     Nephropathy     Osteoarthritis     PAD (peripheral artery disease) (HCC)     Seasonal allergies     Toe infection     bilat great toes    Vertigo     Walks frequently     Wears dentures     upper    Wears glasses      Past Surgical History:   Procedure Laterality Date    CARDIAC PACEMAKER PLACEMENT      CARPAL TUNNEL RELEASE Left     CARPAL TUNNEL RELEASE Right     CARPAL TUNNEL RELEASE      FOOT AMPUTATION Left 2/10/2020    Procedure: PARTIAL 1ST RAY AMPUTATION;  Surgeon: Rohit Cho DPM;  [facility];  Service: Podiatry    INCISION AND DRAINAGE OF WOUND Left 1/12/2020    Procedure: INCISION AND DRAINAGE (I&D) EXTREMITY AND REMOVAL OF SESMOID BONE;  Surgeon: Keyla Maharaj DPM;  [facility];  Service: Podiatry    IR PICC REPO  1/14/2020    KNEE ARTHROSCOPY Left     KNEE ARTHROSCOPY Right     KNEE SURGERY      OR AMPUTATION TOE,I-P JT Bilateral 5/2/2017    Procedure: PARTIAL AMPUTATION RIGHT AND LEFT HALLUX ;  Surgeon: Oniel Caban DPM;  [facility];  Service: Podiatry    OR AMPUTATION TOE,MT-P JT Left 7/25/2017    Procedure: 2ND TOE AMPUTATION;  Surgeon: Oniel Caban DPM;  [facility];  Service: Podiatry    SHOULDER ARTHROSCOPY Left     with screws,RTC    SHOULDER SURGERY      TOE AMPUTATION Left 1/8/2020    Procedure: HALLUX AMPUTATION;  Surgeon: Keyla Maharaj DPM;  [facility];  Service: Podiatry    VASECTOMY       Family History   Problem Relation Age of Onset    Heart disease Father     No Known Problems Mother     Hypertension Father     Pulmonary embolism Father       reports that he has quit smoking  His smoking use included cigarettes  He has a 30 00 pack-year smoking history  He has never used smokeless tobacco  He reports that he does not drink alcohol or use drugs  Physical Exam:   Vitals:    03/12/20 0954   BP: 140/59   BP Location: Left arm   Patient Position: Sitting   Cuff Size: Standard   Pulse: 77   Weight: 89 8 kg (198 lb)   Height: 5' 10" (1 778 m)     Body mass index is 28 41 kg/m²      General: conscious, cooperative, in no acute distress  Eyes: conjunctivae pink, anicteric sclerae  ENT: lips and mucous membranes moist  Neck: supple, no JVD  Chest: clear breath sounds bilateral, no crackles, ronchus or wheezings  CVS: normal rate, regular rhythm  Abdomen: soft, non-tender, non-distended, normoactive bowel sounds  Extremities: no edema of both legs  Skin: no rash  Neuro: awake, alert, oriented  Psych:  Pleasant affect      Lab Results:     Results for orders placed or performed in visit on 03/05/20   Magnesium   Result Value Ref Range    Magnesium 2 4 1 6 - 2 6 mg/dL   PTH, intact   Result Value Ref Range    PTH 46 5 18 4 - 80 1 pg/mL   Renal function panel   Result Value Ref Range    Albumin 3 0 (L) 3 5 - 5 0 g/dL    Calcium 9 5 8 3 - 10 1 mg/dL    Phosphorus 4 3 (H) 2 3 - 4 1 mg/dL    BUN 72 (H) 5 - 25 mg/dL    Creatinine 1 92 (H) 0 60 - 1 30 mg/dL    Sodium 135 (L) 136 - 145 mmol/L    Potassium 4 6 3 5 - 5 3 mmol/L    Chloride 110 (H) 100 - 108 mmol/L    CO2 17 (L) 21 - 32 mmol/L    ANION GAP 8 4 - 13 mmol/L    eGFR 35 ml/min/1 73sq m    Glucose, Fasting 100 (H) 65 - 99 mg/dL   Uric acid   Result Value Ref Range    Uric Acid 3 9 (L) 4 2 - 8 0 mg/dL   CBC and differential   Result Value Ref Range    WBC 11 29 (H) 4 31 - 10 16 Thousand/uL    RBC 3 64 (L) 3 88 - 5 62 Million/uL    Hemoglobin 10 5 (L) 12 0 - 17 0 g/dL    Hematocrit 32 9 (L) 36 5 - 49 3 %    MCV 90 82 - 98 fL    MCH 28 8 26 8 - 34 3 pg    MCHC 31 9 31 4 - 37 4 g/dL    RDW 15 8 (H) 11 6 - 15 1 %    MPV 10 4 8 9 - 12 7 fL    Platelets 958 723 - 473 Thousands/uL    nRBC 0 /100 WBCs    Neutrophils Relative 70 43 - 75 %    Immat GRANS % 0 0 - 2 %    Lymphocytes Relative 10 (L) 14 - 44 %    Monocytes Relative 7 4 - 12 %    Eosinophils Relative 13 (H) 0 - 6 %    Basophils Relative 0 0 - 1 %    Neutrophils Absolute 7 85 (H) 1 85 - 7 62 Thousands/µL    Immature Grans Absolute 0 05 0 00 - 0 20 Thousand/uL    Lymphocytes Absolute 1 10 0 60 - 4 47 Thousands/µL    Monocytes Absolute 0 77 0 17 - 1 22 Thousand/µL    Eosinophils Absolute 1 51 (H) 0 00 - 0 61 Thousand/µL    Basophils Absolute 0 01 0 00 - 0 10 Thousands/µL       Echocardiogram 8/9/15  - left ventricle had an EF of 60% wall thickness was mildly increased with mild concentric hypertrophy and an abnormal left ventricular relaxation  - left atrium was mildly to moderately dilated  - right atrium was mildly dilated  - mitral valve shows mild annular calcification    Renal artery ultrasound 6/27/19  RIGHT RENAL:  No evidence of significant arterial occlusive disease in the main renal artery  Pulsatile renal vein  Renovascular resistive index of 0 8, indicative of parenchymal disease  Renal/Aorta Ratio: 1 81  The Kidney measures 13 cm  LEFT RENAL:  There is evidence of a >60% stenosis in the ostium and mid renal artery  Pulsatile renal vein  Renovascular resistive index of 0 9, indicative of parenchymal disease  Renal/Aorta Ratio: 2 4  The Kidney measures 13 9 cm  MESENTERIC:  There is evidence of a >70% stenosis in the celiac artery  Superior mesenteric artery is patent  Portions of the record may have been created with voice recognition software  Occasional wrong word or "sound a like" substitutions may have occurred due to the inherent limitations of voice recognition software  Read the chart carefully and recognize, using context, where substitutions have occurred  If you have any questions, please contact the dictating provider

## 2020-03-12 NOTE — PROGRESS NOTES
OFFICE follow-up- Nephrology   Wilfredo Santana 75 y o  male MRN: [id number]    Encounter: [id number]          ASSESSMENT and PLAN:  Imer Holly was seen today for an initial consultation    He is 75years old with a past medical history of type 2 diabetes mellitus times 20 years, hypertension, gout  hyperlipidemia proteinuria and diabetic foot ulcer presents for follow-up    Diagnoses and all orders for this visit:    Essential (primary) hypertension  - blood pressures at home slightly higher now but less than 140 over 80  - he is currently on amlodipine 10 mg daily, metoprolol 50 mg twice daily, losartan 25 mg daily  -secondary workup included a repeat renal artery ultrasound which does show a left renal artery stenosis  - his echocardiogram was reviewed as well he should have further follow-up with cardiology to monitor his valvular heart disease- he does have LVH so blood pressure control is very important and this was discussed in detail with him in past    Acute kidney injury on Diabetic kidney disease and persistent proteinuria  - in the setting of prolonged diabetes  -serologic workup in past has been negative  -he is currently on long-term cefepime for another 3 weeks, his most recent blood work shows his creatinine increased to 2 7 his baseline creatinine is 1 6-2 0  -hold furosemide for the next 2 days and start furosemide 40 mg daily on Sunday  -hold losartan for the next 2 days and start losartan 25 mg daily on Sunday  -repeat a renal function panel, urinalysis, CBC, and urinalysis and urine eosinophil on Monday    Nephropathy-diabetic kidney disease  -serologies negative will monitor at this point    Gout  - on allopurinol 300 mg daily  - has been gout free, will monitor and continue    Bilateral leg edema  -now improved significantly decrease furosemide does    Left renal artery stenosis and history of diastolic heart failure  -renal artery Doppler showed 60% stenosis  -can be indication and cause of flash pulmonary edema  -mitral valve disease also present    Left foot osteomyelitis, peripheral vascular disease  -status post amputation now on 6 weeks of cefepime    Repeat blood work Monday, further adjustments will be made follow-up in 2 months to ensure stability    Patient Instructions   Kemar Carpenter, your here today for follow-up regarding your kidney function:  1  Year creatinine did increase to 2 7 year baseline creatinine is around 1 6 at this time we can do the following  -please hold furosemide for the next 2 days and start furosemide 40 mg daily on Sunday  -please hold losartan for the next 2 days and restart 25 mg on Sunday  -repeat blood work and urine test has been ordered your antibiotics were also decreased   -we will call you with the results next week regarding your blood work and we will make further recommendations at that time      HPI:  Kee Yepez is a 75 y o male who was referred by Dr Sybil pond  provider found for evaluation of  Initial evaluation    He is 75years old and has a history of type 2 diabetes mellitus currently on oral medications stage IIIA to 2 chronic kidney disease mitral valve prolapse,    Was recently hospitalized for a amputation of his toe, he is now requiring long-term antibiotics this seems to be improving his infection and osteomyelitis, at home he has been doing well is weight is down his no edema his blood pressures have been stable he denies any chest pain or shortness of breath fevers chills nausea vomiting diarrhea constipation no foamy or bloody urine      ROS: All the systems were reviewed and were negative except as documented on the HPI  Allergies: Marybelle Anders; Lamisil [terbinafine]; Lamisil [terbinafine];  Other; and Latex    Medications:   Current Outpatient Medications:     allopurinol (ZYLOPRIM) 300 mg tablet, Take 1 tablet (300 mg total) by mouth every morning, Disp: 90 tablet, Rfl: 2    amLODIPine (NORVASC) 10 mg tablet, TAKE 1 TABLET BY MOUTH EVERY DAY, Disp: 90 tablet, Rfl: 3    apixaban (ELIQUIS) 5 mg, Take 1 tablet (5 mg total) by mouth every 12 (twelve) hours, Disp: 60 tablet, Rfl: 11    betamethasone valerate (VALISONE) 0 1 % cream, as needed , Disp: , Rfl: 3    Cholecalciferol (VITAMIN D-3) 1000 units CAPS, Take 1 capsule by mouth every morning  , Disp: , Rfl:     Dupilumab, Asthma, (DUPIXENT SC), Inject under the skin , Disp: , Rfl:     ferrous gluconate (FERGON) 324 mg tablet, Take 1 tablet (324 mg total) by mouth 2 (two) times a day before meals, Disp: 30 tablet, Rfl: 2    furosemide (LASIX) 40 mg tablet, Take 1 tablet (40 mg total) by mouth daily, Disp: , Rfl: 0    glucagon (GLUCAGON EMERGENCY) 1 MG injection, Inject 1 mg under the skin once as needed for low blood sugar for up to 1 dose, Disp: 1 kit, Rfl: 2    glucose blood (ONE TOUCH ULTRA TEST) test strip, 1 each by Other route 3 (three) times a day Use to test blood sugar, Disp: 130 each, Rfl: 6    hydrOXYzine HCL (ATARAX) 10 mg tablet, Take 10 mg by mouth every 6 (six) hours as needed  , Disp: , Rfl:     Insulin Degludec-Liraglutide (Insulin Degludec-Liraglutide) 100 units-3 6 mg/mL injection pen, Inject 19 Units under the skin daily, Disp: , Rfl:     LORazepam (ATIVAN) 0 5 mg tablet, Take 1 tablet (0 5 mg total) by mouth every 8 (eight) hours as needed for anxiety, Disp: 60 tablet, Rfl: 2    losartan (COZAAR) 25 mg tablet, Take 1 tablet (25 mg total) by mouth daily, Disp: , Rfl:     metFORMIN (GLUCOPHAGE) 500 mg tablet, Take 1 tablet (500 mg total) by mouth 2 (two) times a day with meals, Disp: 360 tablet, Rfl: 3    metoprolol tartrate (LOPRESSOR) 50 mg tablet, Take 1 tablet (50 mg total) by mouth every 12 (twelve) hours, Disp: 180 tablet, Rfl: 3    Misc   Devices (FREE SPIRIT KNEE/LEG WALKER) MISC, by Does not apply route 3 (three) times a day, Disp: 1 each, Rfl: 0    Multiple Vitamin (MULTIVITAMIN) tablet, Take 1 tablet by mouth daily IN AM, Disp: , Rfl:     omega-3-acid ethyl esters (LOVAZA) 1 g capsule, Take 2 capsules (2 g total) by mouth 2 (two) times a day, Disp: 360 capsule, Rfl: 3    simvastatin (ZOCOR) 20 mg tablet, Take 1 tablet (20 mg total) by mouth daily at bedtime, Disp: 90 tablet, Rfl: 3    tamsulosin (FLOMAX) 0 4 mg, Take 0 4 mg by mouth daily with dinner, Disp: , Rfl: 3    ONE TOUCH ULTRA TEST test strip, USE TO TEST BLOOD SUGAR 3 TIMES A DAY, Disp: 100 each, Rfl: 3    Past Medical History:   Diagnosis Date    Arthritis     OA    Bruise of both arms     forearms and both hands    Bruises easily     [facility]     [facility]     Diabetic foot ulcer [facility] [address] [facility]     Eczema     Erectile dysfunction     Gout     Hyperlipidemia     Hypertension     Murmur     Nephropathy     Osteoarthritis     [facility]AD [facility]peripheral artery disease) [facility]     Seasonal allergies     Toe infection     bilat great toes    Vertigo     Walks frequently     Wears dentures     upper    Wears glasses      Past Surgical History:   Procedure Laterality Date    CARDIAC PACEMAKER PLACEMENT      CARPAL TUNNEL RELEASE Left     CARPAL TUNNEL RELEASE Right     CARPAL TUNNEL RELEASE      FOOT AMPUTATION Left 2/10/2020    Procedure: PARTIAL 1ST RAY AMPUTATION;  Surgeon: Natalie Mera DPM;  [facility];  Service: Podiatry    INCISION AND DRAINAGE OF WOUND Left 1/12/2020    Procedure: INCISION AND DRAINAGE (I&D) EXTREMITY AND REMOVAL OF SESMOID BONE;  Surgeon: Bernardo Ramires DPM;  [facility];  Service: Podiatry    [facility]    KNEE ARTHROSCOPY Left     KNEE ARTHROSCOPY Right     KNEE SURGERY      WA AMPUTATION TOE,I-P JT Bilateral 5/2/2017    Procedure: PARTIAL AMPUTATION RIGHT AND LEFT HALLUX ;  Surgeon: Natalie eMra DPM;  [facility];  Service: Podiatry    WA AMPUTATION TOE,MT-P JT Left 7/25/2017    Procedure: 2ND TOE AMPUTATION;  Surgeon: Natalie Mera DPM;  [facility];  Service: Podiatry    SHOULDER ARTHROSCOPY Left     with screws,RTC    SHOULDER SURGERY      TOE AMPUTATION Left 1/8/2020    Procedure: Bradley Ward;  Surgeon: Juliocesar Craft DPM;  [facility];  Service: Podiatry    VASECTOMY       Family History   Problem Relation Age of Onset    Heart disease Father     No Known Problems Mother     Hypertension Father     Pulmonary embolism Father       reports that he has quit smoking  His smoking use included cigarettes  He has a 30 00 pack-year smoking history  He has never used smokeless tobacco  He reports that he does not drink alcohol or use drugs  Physical Exam:   Vitals:    03/12/20 0954   BP: 140/59   BP Location: Left arm   Patient Position: Sitting   Cuff Size: Standard   Pulse: 77   Weight: 89 8 kg (198 lb)   Height: 5' 10" (1 778 m)     Body mass index is 28 41 kg/m²      General: conscious, cooperative, in no acute distress  Eyes: conjunctivae pink, anicteric sclerae  ENT: lips and mucous membranes moist  Neck: supple, no JVD  Chest: clear breath sounds bilateral, no crackles, ronchus or wheezings  CVS: normal rate, regular rhythm  Abdomen: soft, non-tender, non-distended, normoactive bowel sounds  Extremities: no edema of both legs  Skin: no rash  Neuro: awake, alert, oriented  Psych:  Pleasant affect      Lab Results:     Results for orders placed or performed in visit on 03/05/20   Magnesium   Result Value Ref Range    Magnesium 2 4 1 6 - 2 6 mg/dL   PTH, intact   Result Value Ref Range    PTH 46 5 18 4 - 80 1 pg/mL   Renal function panel   Result Value Ref Range    Albumin 3 0 (L) 3 5 - 5 0 g/dL    Calcium 9 5 8 3 - 10 1 mg/dL    Phosphorus 4 3 (H) 2 3 - 4 1 mg/dL    BUN 72 (H) 5 - 25 mg/dL    Creatinine 1 92 (H) 0 60 - 1 30 mg/dL    Sodium 135 (L) 136 - 145 mmol/L    Potassium 4 6 3 5 - 5 3 mmol/L    Chloride 110 (H) 100 - 108 mmol/L    CO2 17 (L) 21 - 32 mmol/L    ANION GAP 8 4 - 13 mmol/L    eGFR 35 ml/min/1 73sq m    Glucose, Fasting 100 (H) 65 - 99 mg/dL   Uric acid   Result Value Ref Range    Uric Acid 3 9 (L) 4 2 - 8 0 mg/dL   CBC and differential   Result Value Ref Range    WBC 11 29 (H) 4 31 - 10 16 Thousand/uL    RBC 3 64 (L) 3 88 - 5 62 Million/uL    Hemoglobin 10 5 (L) 12 0 - 17 0 g/dL    Hematocrit 32 9 (L) 36 5 - 49 3 %    MCV 90 82 - 98 fL    MCH 28 8 26 8 - 34 3 pg    MCHC 31 9 31 4 - 37 4 g/dL    RDW 15 8 (H) 11 6 - 15 1 %    MPV 10 4 8 9 - 12 7 fL    Platelets 111 453 - 962 Thousands/uL    nRBC 0 /100 WBCs    Neutrophils Relative 70 43 - 75 %    Immat GRANS % 0 0 - 2 %    Lymphocytes Relative 10 (L) 14 - 44 %    Monocytes Relative 7 4 - 12 %    Eosinophils Relative 13 (H) 0 - 6 %    Basophils Relative 0 0 - 1 %    Neutrophils Absolute 7 85 (H) 1 85 - 7 62 Thousands/µL    Immature Grans Absolute 0 05 0 00 - 0 20 Thousand/uL    Lymphocytes Absolute 1 10 0 60 - 4 47 Thousands/µL    Monocytes Absolute 0 77 0 17 - 1 22 Thousand/µL    Eosinophils Absolute 1 51 (H) 0 00 - 0 61 Thousand/µL    Basophils Absolute 0 01 0 00 - 0 10 Thousands/µL       Echocardiogram 8/9/15  - left ventricle had an EF of 60% wall thickness was mildly increased with mild concentric hypertrophy and an abnormal left ventricular relaxation  - left atrium was mildly to moderately dilated  - right atrium was mildly dilated  - mitral valve shows mild annular calcification    Renal artery ultrasound 6/27/19  RIGHT RENAL:  No evidence of significant arterial occlusive disease in the main renal artery  Pulsatile renal vein  Renovascular resistive index of 0 8, indicative of parenchymal disease  Renal/Aorta Ratio: 1 81  The Kidney measures 13 cm  LEFT RENAL:  There is evidence of a >60% stenosis in the ostium and mid renal artery  Pulsatile renal vein  Renovascular resistive index of 0 9, indicative of parenchymal disease  Renal/Aorta Ratio: 2 4  The Kidney measures 13 9 cm  MESENTERIC:  There is evidence of a >70% stenosis in the celiac artery    Superior mesenteric artery is patent  Portions of the record may have been created with voice recognition software  Occasional wrong word or "sound a like" substitutions may have occurred due to the inherent limitations of voice recognition software  Read the chart carefully and recognize, using context, where substitutions have occurred  If you have any questions, please contact the dictating provider

## 2020-03-13 LAB
LEFT EYE DIABETIC RETINOPATHY: NORMAL
RIGHT EYE DIABETIC RETINOPATHY: NORMAL

## 2020-03-17 ENCOUNTER — TELEPHONE (OUTPATIENT)
Dept: OTHER | Facility: HOSPITAL | Age: 65
End: 2020-03-17

## 2020-03-17 DIAGNOSIS — M86.9 OSTEOMYELITIS OF LEFT FOOT, UNSPECIFIED TYPE (HCC): Primary | ICD-10-CM

## 2020-03-17 NOTE — TELEPHONE ENCOUNTER
Patient did not come in for scheduled ID follow up today  Lab review showed his creatinine had increased to 3 71  Patient will need to change dosing of Cefepime again  Discussed with ID attending, Dr Laxmi Gifford to [facility]  Dose is now reduced to  Cefepime, 1g, q24 hours

## 2020-03-17 NOTE — PROGRESS NOTES
Pt was a no show this morning due to anxiety about COVID-19  Pt is requesting a telemed visit  Called and spoke to pt regarding message that was sent to Radha Ross last night  Per Isabelle's instructions:     I advised that we do not perform telemed visits at this time  Offered pt the option of rescheduling his appt or he can finish out his abx course on 3/30, w/ out coming to see us  Pt is requesting to finish out his course w/ out f/u at this time  Advised him that we will continue to monitor his weekly labs, and that we would let his Royce Cruz know to pull his picc line on 3/31  Also informed him that [facility] was in the process of making changes to his dosing of cefepime  [facility] should be reaching out soon to discuss delivery  Asked that he please call with any questions or concerns  Pt expressed understanding, and thanked us for the call  [facility] to [facility] and faxed order over to d/c picc line on 3/31/20

## 2020-03-19 ENCOUNTER — TELEPHONE (OUTPATIENT)
Dept: OTHER | Facility: HOSPITAL | Age: 65
End: 2020-03-19

## 2020-03-19 ENCOUNTER — TELEPHONE (OUTPATIENT)
Dept: INFECTIOUS DISEASES | Facility: CLINIC | Age: 65
End: 2020-03-19

## 2020-03-19 ENCOUNTER — TELEPHONE (OUTPATIENT)
Dept: FAMILY MEDICINE CLINIC | Facility: CLINIC | Age: 65
End: 2020-03-19

## 2020-03-19 DIAGNOSIS — E11.8 TYPE 2 DIABETES MELLITUS WITH COMPLICATION (HCC): ICD-10-CM

## 2020-03-19 DIAGNOSIS — N17.9 ACUTE KIDNEY INJURY (HCC): Primary | ICD-10-CM

## 2020-03-19 NOTE — TELEPHONE ENCOUNTER
Called pt per Isabelle's instructions and advised him to not take any further doses of IV cefepime  Informed him that we would be transitioning him over to PO abx for the duration on his therapy due to his kidney function  I advised him that we would contact [facility] and have them come and pull his picc line  I also advised him again that f/u was not required as we have previously discussed his anxiety regarding the corona virus  Let him know that Shen Grider would be calling later today to discuss PO abx options and that after the call she would send a rx to the pt's pharmacy on file  Also that Nephrology would be contacting him as well regarding a f/u  Pt expressed understanding regarding all of these insructions  Called [facility] and gave verbal orders to discontinue picc line asap  Also let them know we were discontinuing the IV cefepime  RN expressed understanding

## 2020-03-19 NOTE — TELEPHONE ENCOUNTER
Marion Crowder sent below message through [employer] in regards to Wilfrido Landon, please advise      Dr Ana Wing  This is Royce Fritz for Knox Lesch, [place]  51  One question from Infectious Disease is do you want Wilfrido Landon to continue on the ferrous gluconate iron medication? I refilled the script for 15 more days but need to know if you want him to continue on it  Also,  Brennan's creatinine level last week was elevated so Infectious Disease decreased his IV antibiotic prescription in half and the kidney doctor ( Dr Sunday Sawyer) held two of his meds for two days- losartan and furosemide  Then again this week the level was even more elevated so on Tuesday Infectious Disease again decreased his IV antibiotic prescription in half and now today Infectious Disease decided to stop the IV antibiotics and are putting him on an oral medication (dont know the name of it yet) for the remainder of his antibiotic treatment which should end on   In addition to the change in antibiotics the kidney doctor also is holding 3 medications- losartan, metformin and furosemide  The [address] [place] [address], who atilio Brennan's blood work these past 2 weeks, took the blood work to [facility] labs, so not sure if you have access to the results, if you want to view them   Thanks, Marion Crowder

## 2020-03-19 NOTE — TELEPHONE ENCOUNTER
Call patient's wife and recommend he continue ferrous gluconate as prescribed per hospital discharge

## 2020-03-19 NOTE — PROGRESS NOTES
Spoke with [facility] and Dr Teodora Osuna through tiger text and discussed with patient most recent blood work yesterday evening and then today  Patient's creatinine now increased to 3 71, this was despite holding losartan and furosemide, baseline creatinine 1 6-1 9  Patient feels well his blood glucoses have been stable as blood pressure has been stable and he is urinating well  Will have him to continue holding losartan continue holding furosemide and hold metformin as well  He does have an increased urine eosinophil of 1% this could be related to an acute interstitial nephritis    Given that he is had multiple hospitalization we will try and avoid further hospitalization at this time    As an outpatient advise a following:  -We will check BMPs 2 times a week, starting today  -discussed with Infectious Disease cefepime will be changed to a fluoroquinolones  -if there are any electrolyte abnormalities, or worsening creatinine or symptomatology than I have asked him to come directly into the emergency room for further evaluation and treatment  -will hold off steroid therapy at this time given the risk of hyperglycemia as we are cutting back on his diabetic medication but this may be an option if he needs hospital admission  -all questions were answered he will go today to repeat his BMP at [facility]

## 2020-03-19 NOTE — TELEPHONE ENCOUNTER
Consulted with patient's nephrologist, Dr Army Paulson  Patient's creatinine continuing to worsen  He cannot continue on IV cefepime treatment at this time  He will require ongoing antibiotic treatment for his L foot osteomyelitis through 3/30/2020  At this time I will discontinue the Cefepime  [facility] has been instructed to remove his PICC  I have called in a prescription to [facility] in [place] for PO Levaquin, 750mg q24 hours, through 3/30/2020, #6 with zero refills  I spoke to patient over the phone and he verbalized understanding

## 2020-03-20 ENCOUNTER — APPOINTMENT (OUTPATIENT)
Dept: LAB | Facility: CLINIC | Age: 65
DRG: 683 | End: 2020-03-20
Payer: MEDICARE

## 2020-03-20 DIAGNOSIS — I50.31 ACUTE DIASTOLIC (CONGESTIVE) HEART FAILURE (HCC): ICD-10-CM

## 2020-03-20 DIAGNOSIS — N18.9 CKD (CHRONIC KIDNEY DISEASE): ICD-10-CM

## 2020-03-20 DIAGNOSIS — N17.9 AKI (ACUTE KIDNEY INJURY) (HCC): ICD-10-CM

## 2020-03-20 LAB
ALBUMIN SERPL BCP-MCNC: 3 G/DL (ref 3.5–5)
ANION GAP SERPL CALCULATED.3IONS-SCNC: 12 MMOL/L (ref 4–13)
BACTERIA UR QL AUTO: ABNORMAL /HPF
BASOPHILS # BLD AUTO: 0.02 THOUSANDS/ΜL (ref 0–0.1)
BASOPHILS NFR BLD AUTO: 0 % (ref 0–1)
BILIRUB UR QL STRIP: NEGATIVE
BUN SERPL-MCNC: 107 MG/DL (ref 5–25)
CALCIUM SERPL-MCNC: 8.9 MG/DL (ref 8.3–10.1)
CHLORIDE SERPL-SCNC: 108 MMOL/L (ref 100–108)
CLARITY UR: ABNORMAL
CO2 SERPL-SCNC: 16 MMOL/L (ref 21–32)
COARSE GRAN CASTS URNS QL MICRO: ABNORMAL /LPF
COLOR UR: YELLOW
CREAT SERPL-MCNC: 4.65 MG/DL (ref 0.6–1.3)
EOSINOPHIL # BLD AUTO: 0.92 THOUSAND/ΜL (ref 0–0.61)
EOSINOPHIL NFR BLD AUTO: 9 % (ref 0–6)
ERYTHROCYTE [DISTWIDTH] IN BLOOD BY AUTOMATED COUNT: 16.4 % (ref 11.6–15.1)
GFR SERPL CREATININE-BSD FRML MDRD: 12 ML/MIN/1.73SQ M
GLUCOSE SERPL-MCNC: 131 MG/DL (ref 65–140)
GLUCOSE UR STRIP-MCNC: ABNORMAL MG/DL
HCT VFR BLD AUTO: 28.2 % (ref 36.5–49.3)
HGB BLD-MCNC: 9.1 G/DL (ref 12–17)
HGB UR QL STRIP.AUTO: ABNORMAL
IMM GRANULOCYTES # BLD AUTO: 0.06 THOUSAND/UL (ref 0–0.2)
IMM GRANULOCYTES NFR BLD AUTO: 1 % (ref 0–2)
KETONES UR STRIP-MCNC: NEGATIVE MG/DL
LEUKOCYTE ESTERASE UR QL STRIP: NEGATIVE
LYMPHOCYTES # BLD AUTO: 1.04 THOUSANDS/ΜL (ref 0.6–4.47)
LYMPHOCYTES NFR BLD AUTO: 10 % (ref 14–44)
MAGNESIUM SERPL-MCNC: 2.7 MG/DL (ref 1.6–2.6)
MCH RBC QN AUTO: 28.7 PG (ref 26.8–34.3)
MCHC RBC AUTO-ENTMCNC: 32.3 G/DL (ref 31.4–37.4)
MCV RBC AUTO: 89 FL (ref 82–98)
MONOCYTES # BLD AUTO: 0.81 THOUSAND/ΜL (ref 0.17–1.22)
MONOCYTES NFR BLD AUTO: 8 % (ref 4–12)
NEUTROPHILS # BLD AUTO: 7.21 THOUSANDS/ΜL (ref 1.85–7.62)
NEUTS SEG NFR BLD AUTO: 72 % (ref 43–75)
NITRITE UR QL STRIP: NEGATIVE
NON-SQ EPI CELLS URNS QL MICRO: ABNORMAL /HPF
NRBC BLD AUTO-RTO: 0 /100 WBCS
PH UR STRIP.AUTO: 6 [PH]
PHOSPHATE SERPL-MCNC: 5.7 MG/DL (ref 2.3–4.1)
PLATELET # BLD AUTO: 161 THOUSANDS/UL (ref 149–390)
PMV BLD AUTO: 11 FL (ref 8.9–12.7)
POTASSIUM SERPL-SCNC: 5 MMOL/L (ref 3.5–5.3)
PROT UR STRIP-MCNC: ABNORMAL MG/DL
RBC # BLD AUTO: 3.17 MILLION/UL (ref 3.88–5.62)
RBC #/AREA URNS AUTO: ABNORMAL /HPF
SODIUM SERPL-SCNC: 136 MMOL/L (ref 136–145)
SP GR UR STRIP.AUTO: 1.02 (ref 1–1.03)
UROBILINOGEN UR QL STRIP.AUTO: 0.2 E.U./DL
WBC # BLD AUTO: 10.06 THOUSAND/UL (ref 4.31–10.16)
WBC #/AREA URNS AUTO: ABNORMAL /HPF

## 2020-03-20 PROCEDURE — 36415 COLL VENOUS BLD VENIPUNCTURE: CPT

## 2020-03-20 PROCEDURE — 85025 COMPLETE CBC W/AUTO DIFF WBC: CPT

## 2020-03-20 PROCEDURE — 83735 ASSAY OF MAGNESIUM: CPT

## 2020-03-20 PROCEDURE — 80069 RENAL FUNCTION PANEL: CPT

## 2020-03-20 PROCEDURE — 81001 URINALYSIS AUTO W/SCOPE: CPT | Performed by: INTERNAL MEDICINE

## 2020-03-22 ENCOUNTER — APPOINTMENT (EMERGENCY)
Dept: RADIOLOGY | Facility: HOSPITAL | Age: 65
DRG: 683 | End: 2020-03-22
Payer: MEDICARE

## 2020-03-22 ENCOUNTER — HOSPITAL ENCOUNTER (INPATIENT)
Facility: HOSPITAL | Age: 65
LOS: 7 days | Discharge: HOME HEALTH | DRG: 683 | End: 2020-03-29
Attending: EMERGENCY MEDICINE | Admitting: INTERNAL MEDICINE
Payer: MEDICARE

## 2020-03-22 DIAGNOSIS — I10 ESSENTIAL (PRIMARY) HYPERTENSION: ICD-10-CM

## 2020-03-22 DIAGNOSIS — I50.32 CHRONIC DIASTOLIC (CONGESTIVE) HEART FAILURE (HCC): ICD-10-CM

## 2020-03-22 DIAGNOSIS — K92.2 GI BLEED: ICD-10-CM

## 2020-03-22 DIAGNOSIS — I10 ESSENTIAL HYPERTENSION: ICD-10-CM

## 2020-03-22 DIAGNOSIS — N17.9 ACUTE RENAL FAILURE, UNSPECIFIED ACUTE RENAL FAILURE TYPE (HCC): Primary | ICD-10-CM

## 2020-03-22 DIAGNOSIS — D64.9 ANEMIA: ICD-10-CM

## 2020-03-22 DIAGNOSIS — N17.9 ACUTE RENAL FAILURE (ARF) (HCC): ICD-10-CM

## 2020-03-22 DIAGNOSIS — N17.9 AKI (ACUTE KIDNEY INJURY) (HCC): Primary | ICD-10-CM

## 2020-03-22 DIAGNOSIS — M86.9 OSTEOMYELITIS OF LEFT FOOT, UNSPECIFIED TYPE (HCC): ICD-10-CM

## 2020-03-22 DIAGNOSIS — E11.40 TYPE 2 DIABETES MELLITUS WITH DIABETIC NEUROPATHY, WITHOUT LONG-TERM CURRENT USE OF INSULIN (HCC): ICD-10-CM

## 2020-03-22 LAB
ANION GAP SERPL CALCULATED.3IONS-SCNC: 11 MMOL/L (ref 4–13)
ANION GAP SERPL CALCULATED.3IONS-SCNC: 13 MMOL/L (ref 4–13)
BASOPHILS # BLD AUTO: 0.03 THOUSANDS/ΜL (ref 0–0.1)
BASOPHILS NFR BLD AUTO: 0 % (ref 0–1)
BUN SERPL-MCNC: 105 MG/DL (ref 5–25)
BUN SERPL-MCNC: 106 MG/DL (ref 5–25)
CALCIUM SERPL-MCNC: 9.1 MG/DL (ref 8.3–10.1)
CALCIUM SERPL-MCNC: 9.4 MG/DL (ref 8.3–10.1)
CHLORIDE SERPL-SCNC: 102 MMOL/L (ref 100–108)
CHLORIDE SERPL-SCNC: 104 MMOL/L (ref 100–108)
CK SERPL-CCNC: 57 U/L (ref 39–308)
CO2 SERPL-SCNC: 18 MMOL/L (ref 21–32)
CO2 SERPL-SCNC: 18 MMOL/L (ref 21–32)
CREAT SERPL-MCNC: 5.29 MG/DL (ref 0.6–1.3)
CREAT SERPL-MCNC: 5.35 MG/DL (ref 0.6–1.3)
EOSINOPHIL # BLD AUTO: 0.51 THOUSAND/ΜL (ref 0–0.61)
EOSINOPHIL NFR BLD AUTO: 6 % (ref 0–6)
ERYTHROCYTE [DISTWIDTH] IN BLOOD BY AUTOMATED COUNT: 16.4 % (ref 11.6–15.1)
GFR SERPL CREATININE-BSD FRML MDRD: 10 ML/MIN/1.73SQ M
GFR SERPL CREATININE-BSD FRML MDRD: 10 ML/MIN/1.73SQ M
GLUCOSE SERPL-MCNC: 110 MG/DL (ref 65–140)
GLUCOSE SERPL-MCNC: 147 MG/DL (ref 65–140)
GLUCOSE SERPL-MCNC: 99 MG/DL (ref 65–140)
HCT VFR BLD AUTO: 27.5 % (ref 36.5–49.3)
HGB BLD-MCNC: 9.3 G/DL (ref 12–17)
IMM GRANULOCYTES # BLD AUTO: 0.02 THOUSAND/UL (ref 0–0.2)
IMM GRANULOCYTES NFR BLD AUTO: 0 % (ref 0–2)
LACTATE SERPL-SCNC: 0.8 MMOL/L (ref 0.5–2)
LYMPHOCYTES # BLD AUTO: 0.71 THOUSANDS/ΜL (ref 0.6–4.47)
LYMPHOCYTES NFR BLD AUTO: 8 % (ref 14–44)
MAGNESIUM SERPL-MCNC: 2.4 MG/DL (ref 1.6–2.6)
MCH RBC QN AUTO: 29.9 PG (ref 26.8–34.3)
MCHC RBC AUTO-ENTMCNC: 33.8 G/DL (ref 31.4–37.4)
MCV RBC AUTO: 88 FL (ref 82–98)
MONOCYTES # BLD AUTO: 0.56 THOUSAND/ΜL (ref 0.17–1.22)
MONOCYTES NFR BLD AUTO: 6 % (ref 4–12)
NEUTROPHILS # BLD AUTO: 7.22 THOUSANDS/ΜL (ref 1.85–7.62)
NEUTS SEG NFR BLD AUTO: 80 % (ref 43–75)
NRBC BLD AUTO-RTO: 0 /100 WBCS
PLATELET # BLD AUTO: 148 THOUSANDS/UL (ref 149–390)
PMV BLD AUTO: 9.7 FL (ref 8.9–12.7)
POTASSIUM SERPL-SCNC: 5.7 MMOL/L (ref 3.5–5.3)
POTASSIUM SERPL-SCNC: 5.8 MMOL/L (ref 3.5–5.3)
RBC # BLD AUTO: 3.11 MILLION/UL (ref 3.88–5.62)
SODIUM SERPL-SCNC: 133 MMOL/L (ref 136–145)
SODIUM SERPL-SCNC: 133 MMOL/L (ref 136–145)
WBC # BLD AUTO: 9.05 THOUSAND/UL (ref 4.31–10.16)

## 2020-03-22 PROCEDURE — 94760 N-INVAS EAR/PLS OXIMETRY 1: CPT

## 2020-03-22 PROCEDURE — 82948 REAGENT STRIP/BLOOD GLUCOSE: CPT

## 2020-03-22 PROCEDURE — 99285 EMERGENCY DEPT VISIT HI MDM: CPT

## 2020-03-22 PROCEDURE — 93005 ELECTROCARDIOGRAM TRACING: CPT

## 2020-03-22 PROCEDURE — 85025 COMPLETE CBC W/AUTO DIFF WBC: CPT | Performed by: EMERGENCY MEDICINE

## 2020-03-22 PROCEDURE — 80048 BASIC METABOLIC PNL TOTAL CA: CPT | Performed by: PHYSICIAN ASSISTANT

## 2020-03-22 PROCEDURE — 99291 CRITICAL CARE FIRST HOUR: CPT | Performed by: EMERGENCY MEDICINE

## 2020-03-22 PROCEDURE — 71045 X-RAY EXAM CHEST 1 VIEW: CPT

## 2020-03-22 PROCEDURE — 83735 ASSAY OF MAGNESIUM: CPT | Performed by: EMERGENCY MEDICINE

## 2020-03-22 PROCEDURE — 83605 ASSAY OF LACTIC ACID: CPT | Performed by: PHYSICIAN ASSISTANT

## 2020-03-22 PROCEDURE — 99223 1ST HOSP IP/OBS HIGH 75: CPT | Performed by: PHYSICIAN ASSISTANT

## 2020-03-22 PROCEDURE — 36415 COLL VENOUS BLD VENIPUNCTURE: CPT | Performed by: EMERGENCY MEDICINE

## 2020-03-22 PROCEDURE — 82550 ASSAY OF CK (CPK): CPT | Performed by: INTERNAL MEDICINE

## 2020-03-22 PROCEDURE — 80048 BASIC METABOLIC PNL TOTAL CA: CPT | Performed by: EMERGENCY MEDICINE

## 2020-03-22 PROCEDURE — 94660 CPAP INITIATION&MGMT: CPT

## 2020-03-22 RX ORDER — FUROSEMIDE 10 MG/ML
80 INJECTION INTRAMUSCULAR; INTRAVENOUS ONCE
Status: COMPLETED | OUTPATIENT
Start: 2020-03-22 | End: 2020-03-22

## 2020-03-22 RX ORDER — TAMSULOSIN HYDROCHLORIDE 0.4 MG/1
0.4 CAPSULE ORAL
Status: DISCONTINUED | OUTPATIENT
Start: 2020-03-23 | End: 2020-03-29 | Stop reason: HOSPADM

## 2020-03-22 RX ORDER — INSULIN GLARGINE 100 [IU]/ML
10 INJECTION, SOLUTION SUBCUTANEOUS EVERY MORNING
Status: DISCONTINUED | OUTPATIENT
Start: 2020-03-23 | End: 2020-03-29 | Stop reason: HOSPADM

## 2020-03-22 RX ORDER — LEVOFLOXACIN 500 MG/1
500 TABLET, FILM COATED ORAL
Status: DISCONTINUED | OUTPATIENT
Start: 2020-03-24 | End: 2020-03-23

## 2020-03-22 RX ORDER — PRAVASTATIN SODIUM 40 MG
40 TABLET ORAL
Status: DISCONTINUED | OUTPATIENT
Start: 2020-03-23 | End: 2020-03-29 | Stop reason: HOSPADM

## 2020-03-22 RX ORDER — METOPROLOL TARTRATE 50 MG/1
50 TABLET, FILM COATED ORAL EVERY 12 HOURS SCHEDULED
Status: ON HOLD | COMMUNITY
End: 2020-03-29 | Stop reason: SDUPTHER

## 2020-03-22 RX ORDER — LEVOFLOXACIN 750 MG/1
TABLET ORAL
COMMUNITY
Start: 2020-03-19 | End: 2020-03-29 | Stop reason: HOSPADM

## 2020-03-22 RX ORDER — ONDANSETRON 2 MG/ML
4 INJECTION INTRAMUSCULAR; INTRAVENOUS EVERY 6 HOURS PRN
Status: DISCONTINUED | OUTPATIENT
Start: 2020-03-22 | End: 2020-03-29 | Stop reason: HOSPADM

## 2020-03-22 RX ORDER — ALLOPURINOL 100 MG/1
100 TABLET ORAL EVERY MORNING
Status: DISCONTINUED | OUTPATIENT
Start: 2020-03-23 | End: 2020-03-29 | Stop reason: HOSPADM

## 2020-03-22 RX ORDER — DOXYCYCLINE HYCLATE 50 MG/1
324 CAPSULE, GELATIN COATED ORAL
Status: DISCONTINUED | OUTPATIENT
Start: 2020-03-23 | End: 2020-03-29 | Stop reason: HOSPADM

## 2020-03-22 RX ORDER — AMLODIPINE BESYLATE 10 MG/1
10 TABLET ORAL DAILY
Status: DISCONTINUED | OUTPATIENT
Start: 2020-03-23 | End: 2020-03-22

## 2020-03-22 RX ORDER — LORAZEPAM 1 MG/1
0.5 TABLET ORAL EVERY 8 HOURS PRN
Status: DISCONTINUED | OUTPATIENT
Start: 2020-03-22 | End: 2020-03-29 | Stop reason: HOSPADM

## 2020-03-22 RX ORDER — ACETAMINOPHEN 325 MG/1
650 TABLET ORAL EVERY 6 HOURS PRN
Status: DISCONTINUED | OUTPATIENT
Start: 2020-03-22 | End: 2020-03-29 | Stop reason: HOSPADM

## 2020-03-22 RX ORDER — HYDROXYZINE HYDROCHLORIDE 10 MG/1
10 TABLET, FILM COATED ORAL EVERY 6 HOURS PRN
Status: DISCONTINUED | OUTPATIENT
Start: 2020-03-22 | End: 2020-03-29 | Stop reason: HOSPADM

## 2020-03-22 RX ADMIN — SODIUM CHLORIDE 1000 ML: 0.9 INJECTION, SOLUTION INTRAVENOUS at 19:49

## 2020-03-22 RX ADMIN — APIXABAN 5 MG: 5 TABLET, FILM COATED ORAL at 22:42

## 2020-03-22 RX ADMIN — FUROSEMIDE 80 MG: 10 INJECTION, SOLUTION INTRAMUSCULAR; INTRAVENOUS at 22:25

## 2020-03-22 RX ADMIN — METOPROLOL TARTRATE 12.5 MG: 25 TABLET ORAL at 22:42

## 2020-03-23 ENCOUNTER — APPOINTMENT (INPATIENT)
Dept: ULTRASOUND IMAGING | Facility: HOSPITAL | Age: 65
DRG: 683 | End: 2020-03-23
Payer: MEDICARE

## 2020-03-23 LAB
BASOPHILS # BLD AUTO: 0.04 THOUSANDS/ΜL (ref 0–0.1)
BASOPHILS NFR BLD AUTO: 1 % (ref 0–1)
C3 SERPL-MCNC: 80.4 MG/DL (ref 90–180)
C4 SERPL-MCNC: 20 MG/DL (ref 10–40)
CREAT UR-MCNC: 30.3 MG/DL
EOSINOPHIL # BLD AUTO: 0.63 THOUSAND/ΜL (ref 0–0.61)
EOSINOPHIL NFR BLD AUTO: 8 % (ref 0–6)
ERYTHROCYTE [DISTWIDTH] IN BLOOD BY AUTOMATED COUNT: 16.5 % (ref 11.6–15.1)
GLUCOSE SERPL-MCNC: 114 MG/DL (ref 65–140)
GLUCOSE SERPL-MCNC: 118 MG/DL (ref 65–140)
GLUCOSE SERPL-MCNC: 121 MG/DL (ref 65–140)
GLUCOSE SERPL-MCNC: 76 MG/DL (ref 65–140)
HAV IGM SER QL: NORMAL
HBV CORE IGM SER QL: NORMAL
HBV SURFACE AG SER QL: NORMAL
HCT VFR BLD AUTO: 23.4 % (ref 36.5–49.3)
HCV AB SER QL: NORMAL
HGB BLD-MCNC: 7.9 G/DL (ref 12–17)
HIV 1+2 AB+HIV1 P24 AG SERPL QL IA: NORMAL
HIV1 P24 AG SER QL: NORMAL
IMM GRANULOCYTES # BLD AUTO: 0.02 THOUSAND/UL (ref 0–0.2)
IMM GRANULOCYTES NFR BLD AUTO: 0 % (ref 0–2)
LYMPHOCYTES # BLD AUTO: 1.08 THOUSANDS/ΜL (ref 0.6–4.47)
LYMPHOCYTES NFR BLD AUTO: 13 % (ref 14–44)
MCH RBC QN AUTO: 30.3 PG (ref 26.8–34.3)
MCHC RBC AUTO-ENTMCNC: 33.8 G/DL (ref 31.4–37.4)
MCV RBC AUTO: 90 FL (ref 82–98)
MONOCYTES # BLD AUTO: 0.72 THOUSAND/ΜL (ref 0.17–1.22)
MONOCYTES NFR BLD AUTO: 9 % (ref 4–12)
NEUTROPHILS # BLD AUTO: 5.7 THOUSANDS/ΜL (ref 1.85–7.62)
NEUTS SEG NFR BLD AUTO: 69 % (ref 43–75)
NRBC BLD AUTO-RTO: 0 /100 WBCS
PHOSPHATE SERPL-MCNC: 5.9 MG/DL (ref 2.3–4.1)
PLATELET # BLD AUTO: 146 THOUSANDS/UL (ref 149–390)
PMV BLD AUTO: 10 FL (ref 8.9–12.7)
PROT UR-MCNC: 228 MG/DL
PROT/CREAT UR: 7.52 MG/G{CREAT} (ref 0–0.1)
RBC # BLD AUTO: 2.61 MILLION/UL (ref 3.88–5.62)
WBC # BLD AUTO: 8.19 THOUSAND/UL (ref 4.31–10.16)

## 2020-03-23 PROCEDURE — 85025 COMPLETE CBC W/AUTO DIFF WBC: CPT | Performed by: PHYSICIAN ASSISTANT

## 2020-03-23 PROCEDURE — 86225 DNA ANTIBODY NATIVE: CPT | Performed by: INTERNAL MEDICINE

## 2020-03-23 PROCEDURE — 99223 1ST HOSP IP/OBS HIGH 75: CPT | Performed by: INTERNAL MEDICINE

## 2020-03-23 PROCEDURE — 83520 IMMUNOASSAY QUANT NOS NONAB: CPT | Performed by: INTERNAL MEDICINE

## 2020-03-23 PROCEDURE — 84166 PROTEIN E-PHORESIS/URINE/CSF: CPT | Performed by: PATHOLOGY

## 2020-03-23 PROCEDURE — 87806 HIV AG W/HIV1&2 ANTB W/OPTIC: CPT | Performed by: INTERNAL MEDICINE

## 2020-03-23 PROCEDURE — 84165 PROTEIN E-PHORESIS SERUM: CPT | Performed by: INTERNAL MEDICINE

## 2020-03-23 PROCEDURE — 82948 REAGENT STRIP/BLOOD GLUCOSE: CPT

## 2020-03-23 PROCEDURE — 84166 PROTEIN E-PHORESIS/URINE/CSF: CPT | Performed by: INTERNAL MEDICINE

## 2020-03-23 PROCEDURE — 94660 CPAP INITIATION&MGMT: CPT

## 2020-03-23 PROCEDURE — 76770 US EXAM ABDO BACK WALL COMP: CPT

## 2020-03-23 PROCEDURE — 84165 PROTEIN E-PHORESIS SERUM: CPT | Performed by: PATHOLOGY

## 2020-03-23 PROCEDURE — 84100 ASSAY OF PHOSPHORUS: CPT | Performed by: INTERNAL MEDICINE

## 2020-03-23 PROCEDURE — 86255 FLUORESCENT ANTIBODY SCREEN: CPT | Performed by: INTERNAL MEDICINE

## 2020-03-23 PROCEDURE — 87205 SMEAR GRAM STAIN: CPT | Performed by: PHYSICIAN ASSISTANT

## 2020-03-23 PROCEDURE — 99232 SBSQ HOSP IP/OBS MODERATE 35: CPT | Performed by: INTERNAL MEDICINE

## 2020-03-23 PROCEDURE — 86160 COMPLEMENT ANTIGEN: CPT | Performed by: INTERNAL MEDICINE

## 2020-03-23 PROCEDURE — 80074 ACUTE HEPATITIS PANEL: CPT | Performed by: INTERNAL MEDICINE

## 2020-03-23 PROCEDURE — 82570 ASSAY OF URINE CREATININE: CPT | Performed by: INTERNAL MEDICINE

## 2020-03-23 PROCEDURE — 84156 ASSAY OF PROTEIN URINE: CPT | Performed by: INTERNAL MEDICINE

## 2020-03-23 PROCEDURE — 86430 RHEUMATOID FACTOR TEST QUAL: CPT | Performed by: INTERNAL MEDICINE

## 2020-03-23 PROCEDURE — 86038 ANTINUCLEAR ANTIBODIES: CPT | Performed by: INTERNAL MEDICINE

## 2020-03-23 RX ORDER — FUROSEMIDE 10 MG/ML
80 INJECTION INTRAMUSCULAR; INTRAVENOUS
Status: DISCONTINUED | OUTPATIENT
Start: 2020-03-23 | End: 2020-03-24

## 2020-03-23 RX ORDER — SODIUM BICARBONATE 650 MG/1
650 TABLET ORAL
Status: DISCONTINUED | OUTPATIENT
Start: 2020-03-23 | End: 2020-03-29 | Stop reason: HOSPADM

## 2020-03-23 RX ADMIN — FUROSEMIDE 80 MG: 10 INJECTION, SOLUTION INTRAMUSCULAR; INTRAVENOUS at 18:26

## 2020-03-23 RX ADMIN — PRAVASTATIN SODIUM 40 MG: 40 TABLET ORAL at 18:26

## 2020-03-23 RX ADMIN — APIXABAN 5 MG: 5 TABLET, FILM COATED ORAL at 09:36

## 2020-03-23 RX ADMIN — METOPROLOL TARTRATE 12.5 MG: 25 TABLET ORAL at 22:08

## 2020-03-23 RX ADMIN — FERROUS GLUCONATE 324 MG: 324 TABLET ORAL at 18:26

## 2020-03-23 RX ADMIN — SODIUM BICARBONATE 650 MG TABLET 650 MG: at 12:48

## 2020-03-23 RX ADMIN — APIXABAN 5 MG: 5 TABLET, FILM COATED ORAL at 22:08

## 2020-03-23 RX ADMIN — TAMSULOSIN HYDROCHLORIDE 0.4 MG: 0.4 CAPSULE ORAL at 18:26

## 2020-03-23 RX ADMIN — SODIUM BICARBONATE 650 MG TABLET 650 MG: at 18:26

## 2020-03-23 RX ADMIN — METOPROLOL TARTRATE 12.5 MG: 25 TABLET ORAL at 09:35

## 2020-03-23 RX ADMIN — ALLOPURINOL 100 MG: 100 TABLET ORAL at 09:36

## 2020-03-23 RX ADMIN — FUROSEMIDE 80 MG: 10 INJECTION, SOLUTION INTRAMUSCULAR; INTRAVENOUS at 12:49

## 2020-03-23 RX ADMIN — INSULIN GLARGINE 10 UNITS: 100 INJECTION, SOLUTION SUBCUTANEOUS at 09:40

## 2020-03-23 RX ADMIN — FERROUS GLUCONATE 324 MG: 324 TABLET ORAL at 06:22

## 2020-03-24 ENCOUNTER — APPOINTMENT (INPATIENT)
Dept: RADIOLOGY | Facility: HOSPITAL | Age: 65
DRG: 683 | End: 2020-03-24
Payer: MEDICARE

## 2020-03-24 LAB
ANION GAP SERPL CALCULATED.3IONS-SCNC: 16 MMOL/L (ref 4–13)
ATRIAL RATE: 220 BPM
BASOPHILS # BLD AUTO: 0.06 THOUSANDS/ΜL (ref 0–0.1)
BASOPHILS NFR BLD AUTO: 1 % (ref 0–1)
BUN SERPL-MCNC: 102 MG/DL (ref 5–25)
CALCIUM SERPL-MCNC: 9.2 MG/DL (ref 8.3–10.1)
CHLORIDE SERPL-SCNC: 104 MMOL/L (ref 100–108)
CO2 SERPL-SCNC: 18 MMOL/L (ref 21–32)
CREAT SERPL-MCNC: 4.92 MG/DL (ref 0.6–1.3)
DSDNA AB SER-ACNC: <1 IU/ML (ref 0–9)
EOSINOPHIL # BLD AUTO: 0.78 THOUSAND/ΜL (ref 0–0.61)
EOSINOPHIL NFR BLD AUTO: 9 % (ref 0–6)
EOSINOPHIL NFR URNS MANUAL: 0 %
ERYTHROCYTE [DISTWIDTH] IN BLOOD BY AUTOMATED COUNT: 16.5 % (ref 11.6–15.1)
GFR SERPL CREATININE-BSD FRML MDRD: 11 ML/MIN/1.73SQ M
GLUCOSE SERPL-MCNC: 103 MG/DL (ref 65–140)
GLUCOSE SERPL-MCNC: 165 MG/DL (ref 65–140)
GLUCOSE SERPL-MCNC: 89 MG/DL (ref 65–140)
GLUCOSE SERPL-MCNC: 92 MG/DL (ref 65–140)
HCT VFR BLD AUTO: 27.5 % (ref 36.5–49.3)
HGB BLD-MCNC: 9.1 G/DL (ref 12–17)
IMM GRANULOCYTES # BLD AUTO: 0.04 THOUSAND/UL (ref 0–0.2)
IMM GRANULOCYTES NFR BLD AUTO: 1 % (ref 0–2)
LYMPHOCYTES # BLD AUTO: 1.3 THOUSANDS/ΜL (ref 0.6–4.47)
LYMPHOCYTES NFR BLD AUTO: 15 % (ref 14–44)
MCH RBC QN AUTO: 29.4 PG (ref 26.8–34.3)
MCHC RBC AUTO-ENTMCNC: 33.1 G/DL (ref 31.4–37.4)
MCV RBC AUTO: 89 FL (ref 82–98)
MONOCYTES # BLD AUTO: 0.78 THOUSAND/ΜL (ref 0.17–1.22)
MONOCYTES NFR BLD AUTO: 9 % (ref 4–12)
NEUTROPHILS # BLD AUTO: 5.51 THOUSANDS/ΜL (ref 1.85–7.62)
NEUTS SEG NFR BLD AUTO: 65 % (ref 43–75)
NRBC BLD AUTO-RTO: 0 /100 WBCS
P AXIS: 28 DEGREES
PLATELET # BLD AUTO: 175 THOUSANDS/UL (ref 149–390)
PMV BLD AUTO: 9.4 FL (ref 8.9–12.7)
POTASSIUM SERPL-SCNC: 4.8 MMOL/L (ref 3.5–5.3)
QRS AXIS: -62 DEGREES
QRSD INTERVAL: 186 MS
QT INTERVAL: 464 MS
QTC INTERVAL: 501 MS
RBC # BLD AUTO: 3.1 MILLION/UL (ref 3.88–5.62)
RHEUMATOID FACT SER QL LA: NEGATIVE
SODIUM SERPL-SCNC: 138 MMOL/L (ref 136–145)
T WAVE AXIS: 94 DEGREES
VENTRICULAR RATE: 70 BPM
WBC # BLD AUTO: 8.47 THOUSAND/UL (ref 4.31–10.16)

## 2020-03-24 PROCEDURE — 99232 SBSQ HOSP IP/OBS MODERATE 35: CPT | Performed by: INTERNAL MEDICINE

## 2020-03-24 PROCEDURE — 73630 X-RAY EXAM OF FOOT: CPT

## 2020-03-24 PROCEDURE — 94760 N-INVAS EAR/PLS OXIMETRY 1: CPT

## 2020-03-24 PROCEDURE — 82948 REAGENT STRIP/BLOOD GLUCOSE: CPT

## 2020-03-24 PROCEDURE — 93010 ELECTROCARDIOGRAM REPORT: CPT | Performed by: INTERNAL MEDICINE

## 2020-03-24 PROCEDURE — 99233 SBSQ HOSP IP/OBS HIGH 50: CPT | Performed by: INTERNAL MEDICINE

## 2020-03-24 PROCEDURE — 85025 COMPLETE CBC W/AUTO DIFF WBC: CPT | Performed by: INTERNAL MEDICINE

## 2020-03-24 PROCEDURE — 80048 BASIC METABOLIC PNL TOTAL CA: CPT | Performed by: INTERNAL MEDICINE

## 2020-03-24 PROCEDURE — 94660 CPAP INITIATION&MGMT: CPT

## 2020-03-24 PROCEDURE — 71045 X-RAY EXAM CHEST 1 VIEW: CPT

## 2020-03-24 RX ORDER — FUROSEMIDE 10 MG/ML
80 INJECTION INTRAMUSCULAR; INTRAVENOUS
Status: DISCONTINUED | OUTPATIENT
Start: 2020-03-25 | End: 2020-03-26

## 2020-03-24 RX ADMIN — FUROSEMIDE 80 MG: 10 INJECTION, SOLUTION INTRAMUSCULAR; INTRAVENOUS at 06:36

## 2020-03-24 RX ADMIN — PRAVASTATIN SODIUM 40 MG: 40 TABLET ORAL at 15:24

## 2020-03-24 RX ADMIN — INSULIN GLARGINE 10 UNITS: 100 INJECTION, SOLUTION SUBCUTANEOUS at 09:26

## 2020-03-24 RX ADMIN — SODIUM BICARBONATE 650 MG TABLET 650 MG: at 09:20

## 2020-03-24 RX ADMIN — METOPROLOL TARTRATE 12.5 MG: 25 TABLET ORAL at 09:20

## 2020-03-24 RX ADMIN — ALLOPURINOL 100 MG: 100 TABLET ORAL at 09:20

## 2020-03-24 RX ADMIN — TAMSULOSIN HYDROCHLORIDE 0.4 MG: 0.4 CAPSULE ORAL at 15:24

## 2020-03-24 RX ADMIN — FERROUS GLUCONATE 324 MG: 324 TABLET ORAL at 06:36

## 2020-03-24 RX ADMIN — INSULIN LISPRO 1 UNITS: 100 INJECTION, SOLUTION INTRAVENOUS; SUBCUTANEOUS at 17:27

## 2020-03-24 RX ADMIN — SODIUM BICARBONATE 650 MG TABLET 650 MG: at 17:27

## 2020-03-24 RX ADMIN — APIXABAN 5 MG: 5 TABLET, FILM COATED ORAL at 09:20

## 2020-03-24 RX ADMIN — FERROUS GLUCONATE 324 MG: 324 TABLET ORAL at 15:24

## 2020-03-24 RX ADMIN — INSULIN LISPRO 1 UNITS: 100 INJECTION, SOLUTION INTRAVENOUS; SUBCUTANEOUS at 12:09

## 2020-03-24 RX ADMIN — APIXABAN 5 MG: 5 TABLET, FILM COATED ORAL at 21:29

## 2020-03-24 RX ADMIN — METOPROLOL TARTRATE 12.5 MG: 25 TABLET ORAL at 21:29

## 2020-03-25 LAB
ANION GAP SERPL CALCULATED.3IONS-SCNC: 15 MMOL/L (ref 4–13)
BASOPHILS # BLD AUTO: 0.06 THOUSANDS/ΜL (ref 0–0.1)
BASOPHILS NFR BLD AUTO: 1 % (ref 0–1)
BUN SERPL-MCNC: 100 MG/DL (ref 5–25)
C-ANCA TITR SER IF: NORMAL TITER
CALCIUM SERPL-MCNC: 9.5 MG/DL (ref 8.3–10.1)
CHLORIDE SERPL-SCNC: 104 MMOL/L (ref 100–108)
CO2 SERPL-SCNC: 20 MMOL/L (ref 21–32)
CREAT SERPL-MCNC: 4.52 MG/DL (ref 0.6–1.3)
EOSINOPHIL # BLD AUTO: 0.74 THOUSAND/ΜL (ref 0–0.61)
EOSINOPHIL NFR BLD AUTO: 9 % (ref 0–6)
ERYTHROCYTE [DISTWIDTH] IN BLOOD BY AUTOMATED COUNT: 16.7 % (ref 11.6–15.1)
GBM AB SER IA-ACNC: 4 UNITS (ref 0–20)
GFR SERPL CREATININE-BSD FRML MDRD: 13 ML/MIN/1.73SQ M
GLUCOSE SERPL-MCNC: 118 MG/DL (ref 65–140)
GLUCOSE SERPL-MCNC: 124 MG/DL (ref 65–140)
GLUCOSE SERPL-MCNC: 156 MG/DL (ref 65–140)
GLUCOSE SERPL-MCNC: 173 MG/DL (ref 65–140)
GLUCOSE SERPL-MCNC: 93 MG/DL (ref 65–140)
GLUCOSE SERPL-MCNC: 95 MG/DL (ref 65–140)
HCT VFR BLD AUTO: 26.8 % (ref 36.5–49.3)
HEMOCCULT STL QL: POSITIVE
HGB BLD-MCNC: 8.9 G/DL (ref 12–17)
IMM GRANULOCYTES # BLD AUTO: 0.03 THOUSAND/UL (ref 0–0.2)
IMM GRANULOCYTES NFR BLD AUTO: 0 % (ref 0–2)
LYMPHOCYTES # BLD AUTO: 0.95 THOUSANDS/ΜL (ref 0.6–4.47)
LYMPHOCYTES NFR BLD AUTO: 11 % (ref 14–44)
MCH RBC QN AUTO: 29.8 PG (ref 26.8–34.3)
MCHC RBC AUTO-ENTMCNC: 33.2 G/DL (ref 31.4–37.4)
MCV RBC AUTO: 90 FL (ref 82–98)
MONOCYTES # BLD AUTO: 0.73 THOUSAND/ΜL (ref 0.17–1.22)
MONOCYTES NFR BLD AUTO: 9 % (ref 4–12)
MYELOPEROXIDASE AB SER IA-ACNC: <9 U/ML (ref 0–9)
NEUTROPHILS # BLD AUTO: 6.05 THOUSANDS/ΜL (ref 1.85–7.62)
NEUTS SEG NFR BLD AUTO: 70 % (ref 43–75)
NRBC BLD AUTO-RTO: 0 /100 WBCS
P-ANCA ATYPICAL TITR SER IF: NORMAL TITER
P-ANCA TITR SER IF: NORMAL TITER
PLATELET # BLD AUTO: 173 THOUSANDS/UL (ref 149–390)
PMV BLD AUTO: 9.8 FL (ref 8.9–12.7)
POTASSIUM SERPL-SCNC: 3.9 MMOL/L (ref 3.5–5.3)
PROTEINASE3 AB SER IA-ACNC: <3.5 U/ML (ref 0–3.5)
RBC # BLD AUTO: 2.99 MILLION/UL (ref 3.88–5.62)
RYE IGE QN: NEGATIVE
SODIUM SERPL-SCNC: 139 MMOL/L (ref 136–145)
WBC # BLD AUTO: 8.56 THOUSAND/UL (ref 4.31–10.16)

## 2020-03-25 PROCEDURE — 94760 N-INVAS EAR/PLS OXIMETRY 1: CPT

## 2020-03-25 PROCEDURE — 85025 COMPLETE CBC W/AUTO DIFF WBC: CPT | Performed by: INTERNAL MEDICINE

## 2020-03-25 PROCEDURE — 82948 REAGENT STRIP/BLOOD GLUCOSE: CPT

## 2020-03-25 PROCEDURE — 99232 SBSQ HOSP IP/OBS MODERATE 35: CPT | Performed by: INTERNAL MEDICINE

## 2020-03-25 PROCEDURE — 82272 OCCULT BLD FECES 1-3 TESTS: CPT | Performed by: INTERNAL MEDICINE

## 2020-03-25 PROCEDURE — 94660 CPAP INITIATION&MGMT: CPT

## 2020-03-25 PROCEDURE — 80048 BASIC METABOLIC PNL TOTAL CA: CPT | Performed by: INTERNAL MEDICINE

## 2020-03-25 RX ADMIN — FUROSEMIDE 80 MG: 10 INJECTION, SOLUTION INTRAMUSCULAR; INTRAVENOUS at 18:00

## 2020-03-25 RX ADMIN — FUROSEMIDE 80 MG: 10 INJECTION, SOLUTION INTRAMUSCULAR; INTRAVENOUS at 09:58

## 2020-03-25 RX ADMIN — METOPROLOL TARTRATE 12.5 MG: 25 TABLET ORAL at 21:48

## 2020-03-25 RX ADMIN — METOPROLOL TARTRATE 12.5 MG: 25 TABLET ORAL at 09:58

## 2020-03-25 RX ADMIN — APIXABAN 5 MG: 5 TABLET, FILM COATED ORAL at 09:58

## 2020-03-25 RX ADMIN — PRAVASTATIN SODIUM 40 MG: 40 TABLET ORAL at 18:19

## 2020-03-25 RX ADMIN — APIXABAN 5 MG: 5 TABLET, FILM COATED ORAL at 21:48

## 2020-03-25 RX ADMIN — INSULIN LISPRO 1 UNITS: 100 INJECTION, SOLUTION INTRAVENOUS; SUBCUTANEOUS at 18:20

## 2020-03-25 RX ADMIN — INSULIN GLARGINE 10 UNITS: 100 INJECTION, SOLUTION SUBCUTANEOUS at 10:00

## 2020-03-25 RX ADMIN — TAMSULOSIN HYDROCHLORIDE 0.4 MG: 0.4 CAPSULE ORAL at 18:18

## 2020-03-25 RX ADMIN — SODIUM BICARBONATE 650 MG TABLET 650 MG: at 09:58

## 2020-03-25 RX ADMIN — FERROUS GLUCONATE 324 MG: 324 TABLET ORAL at 07:41

## 2020-03-25 RX ADMIN — SODIUM BICARBONATE 650 MG TABLET 650 MG: at 18:19

## 2020-03-25 RX ADMIN — ALLOPURINOL 100 MG: 100 TABLET ORAL at 09:58

## 2020-03-25 RX ADMIN — FERROUS GLUCONATE 324 MG: 324 TABLET ORAL at 18:19

## 2020-03-26 ENCOUNTER — ANESTHESIA EVENT (INPATIENT)
Dept: GASTROENTEROLOGY | Facility: HOSPITAL | Age: 65
DRG: 683 | End: 2020-03-26
Payer: MEDICARE

## 2020-03-26 LAB
ABO GROUP BLD: NORMAL
ALBUMIN SERPL ELPH-MCNC: 3.49 G/DL (ref 3.5–5)
ALBUMIN SERPL ELPH-MCNC: 53.7 % (ref 52–65)
ALBUMIN UR ELPH-MCNC: 71.6 %
ALPHA1 GLOB MFR UR ELPH: 3.7 %
ALPHA1 GLOB SERPL ELPH-MCNC: 0.33 G/DL (ref 0.1–0.4)
ALPHA1 GLOB SERPL ELPH-MCNC: 5 % (ref 2.5–5)
ALPHA2 GLOB MFR UR ELPH: 6.7 %
ALPHA2 GLOB SERPL ELPH-MCNC: 1.01 G/DL (ref 0.4–1.2)
ALPHA2 GLOB SERPL ELPH-MCNC: 15.5 % (ref 7–13)
ANION GAP SERPL CALCULATED.3IONS-SCNC: 12 MMOL/L (ref 4–13)
B-GLOBULIN MFR UR ELPH: 7 %
BASOPHILS # BLD AUTO: 0.05 THOUSANDS/ΜL (ref 0–0.1)
BASOPHILS NFR BLD AUTO: 0 % (ref 0–1)
BETA GLOB ABNORMAL SERPL ELPH-MCNC: 0.44 G/DL (ref 0.4–0.8)
BETA1 GLOB SERPL ELPH-MCNC: 6.8 % (ref 5–13)
BETA2 GLOB SERPL ELPH-MCNC: 5.4 % (ref 2–8)
BETA2+GAMMA GLOB SERPL ELPH-MCNC: 0.35 G/DL (ref 0.2–0.5)
BLD GP AB SCN SERPL QL: NEGATIVE
BUN SERPL-MCNC: 98 MG/DL (ref 5–25)
CALCIUM SERPL-MCNC: 9 MG/DL (ref 8.3–10.1)
CHLORIDE SERPL-SCNC: 103 MMOL/L (ref 100–108)
CO2 SERPL-SCNC: 24 MMOL/L (ref 21–32)
CREAT SERPL-MCNC: 4.3 MG/DL (ref 0.6–1.3)
EOSINOPHIL # BLD AUTO: 0.84 THOUSAND/ΜL (ref 0–0.61)
EOSINOPHIL NFR BLD AUTO: 7 % (ref 0–6)
ERYTHROCYTE [DISTWIDTH] IN BLOOD BY AUTOMATED COUNT: 16.5 % (ref 11.6–15.1)
FERRITIN SERPL-MCNC: 89 NG/ML (ref 8–388)
GAMMA GLOB ABNORMAL SERPL ELPH-MCNC: 0.88 G/DL (ref 0.5–1.6)
GAMMA GLOB MFR UR ELPH: 11 %
GAMMA GLOB SERPL ELPH-MCNC: 13.6 % (ref 12–22)
GFR SERPL CREATININE-BSD FRML MDRD: 13 ML/MIN/1.73SQ M
GLUCOSE SERPL-MCNC: 103 MG/DL (ref 65–140)
GLUCOSE SERPL-MCNC: 119 MG/DL (ref 65–140)
GLUCOSE SERPL-MCNC: 126 MG/DL (ref 65–140)
GLUCOSE SERPL-MCNC: 137 MG/DL (ref 65–140)
GLUCOSE SERPL-MCNC: 165 MG/DL (ref 65–140)
HCT VFR BLD AUTO: 18.9 % (ref 36.5–49.3)
HGB BLD-MCNC: 6.2 G/DL (ref 12–17)
IGG/ALB SER: 1.16 {RATIO} (ref 1.1–1.8)
IMM GRANULOCYTES # BLD AUTO: 0.06 THOUSAND/UL (ref 0–0.2)
IMM GRANULOCYTES NFR BLD AUTO: 1 % (ref 0–2)
IRON SATN MFR SERPL: 13 %
IRON SERPL-MCNC: 38 UG/DL (ref 65–175)
LYMPHOCYTES # BLD AUTO: 1.3 THOUSANDS/ΜL (ref 0.6–4.47)
LYMPHOCYTES NFR BLD AUTO: 11 % (ref 14–44)
MCH RBC QN AUTO: 29.5 PG (ref 26.8–34.3)
MCHC RBC AUTO-ENTMCNC: 32.8 G/DL (ref 31.4–37.4)
MCV RBC AUTO: 90 FL (ref 82–98)
MONOCYTES # BLD AUTO: 0.97 THOUSAND/ΜL (ref 0.17–1.22)
MONOCYTES NFR BLD AUTO: 8 % (ref 4–12)
NEUTROPHILS # BLD AUTO: 9.17 THOUSANDS/ΜL (ref 1.85–7.62)
NEUTS SEG NFR BLD AUTO: 73 % (ref 43–75)
NRBC BLD AUTO-RTO: 0 /100 WBCS
PLATELET # BLD AUTO: 202 THOUSANDS/UL (ref 149–390)
PMV BLD AUTO: 9.5 FL (ref 8.9–12.7)
POTASSIUM SERPL-SCNC: 3.8 MMOL/L (ref 3.5–5.3)
PROT PATTERN SERPL ELPH-IMP: ABNORMAL
PROT PATTERN UR ELPH-IMP: ABNORMAL
PROT SERPL-MCNC: 6.5 G/DL (ref 6.4–8.2)
PROT UR-MCNC: 218 MG/DL
RBC # BLD AUTO: 2.1 MILLION/UL (ref 3.88–5.62)
RH BLD: POSITIVE
SODIUM SERPL-SCNC: 139 MMOL/L (ref 136–145)
SPECIMEN EXPIRATION DATE: NORMAL
TIBC SERPL-MCNC: 296 UG/DL (ref 250–450)
WBC # BLD AUTO: 12.39 THOUSAND/UL (ref 4.31–10.16)

## 2020-03-26 PROCEDURE — 83550 IRON BINDING TEST: CPT | Performed by: NURSE PRACTITIONER

## 2020-03-26 PROCEDURE — 94660 CPAP INITIATION&MGMT: CPT

## 2020-03-26 PROCEDURE — C9113 INJ PANTOPRAZOLE SODIUM, VIA: HCPCS | Performed by: INTERNAL MEDICINE

## 2020-03-26 PROCEDURE — 86850 RBC ANTIBODY SCREEN: CPT | Performed by: INTERNAL MEDICINE

## 2020-03-26 PROCEDURE — 82728 ASSAY OF FERRITIN: CPT | Performed by: NURSE PRACTITIONER

## 2020-03-26 PROCEDURE — 85025 COMPLETE CBC W/AUTO DIFF WBC: CPT | Performed by: INTERNAL MEDICINE

## 2020-03-26 PROCEDURE — 99232 SBSQ HOSP IP/OBS MODERATE 35: CPT | Performed by: INTERNAL MEDICINE

## 2020-03-26 PROCEDURE — 86901 BLOOD TYPING SEROLOGIC RH(D): CPT | Performed by: INTERNAL MEDICINE

## 2020-03-26 PROCEDURE — 30233N1 TRANSFUSION OF NONAUTOLOGOUS RED BLOOD CELLS INTO PERIPHERAL VEIN, PERCUTANEOUS APPROACH: ICD-10-PCS | Performed by: INTERNAL MEDICINE

## 2020-03-26 PROCEDURE — 82948 REAGENT STRIP/BLOOD GLUCOSE: CPT

## 2020-03-26 PROCEDURE — 86900 BLOOD TYPING SEROLOGIC ABO: CPT | Performed by: INTERNAL MEDICINE

## 2020-03-26 PROCEDURE — 83540 ASSAY OF IRON: CPT | Performed by: NURSE PRACTITIONER

## 2020-03-26 PROCEDURE — 86923 COMPATIBILITY TEST ELECTRIC: CPT

## 2020-03-26 PROCEDURE — 94760 N-INVAS EAR/PLS OXIMETRY 1: CPT

## 2020-03-26 PROCEDURE — 80048 BASIC METABOLIC PNL TOTAL CA: CPT | Performed by: INTERNAL MEDICINE

## 2020-03-26 PROCEDURE — P9016 RBC LEUKOCYTES REDUCED: HCPCS

## 2020-03-26 PROCEDURE — 83516 IMMUNOASSAY NONANTIBODY: CPT | Performed by: INTERNAL MEDICINE

## 2020-03-26 RX ORDER — POTASSIUM CHLORIDE 20 MEQ/1
20 TABLET, EXTENDED RELEASE ORAL ONCE
Status: COMPLETED | OUTPATIENT
Start: 2020-03-26 | End: 2020-03-26

## 2020-03-26 RX ORDER — HYDROXYZINE HYDROCHLORIDE 25 MG/1
25 TABLET, FILM COATED ORAL EVERY 6 HOURS PRN
Status: DISCONTINUED | OUTPATIENT
Start: 2020-03-26 | End: 2020-03-29 | Stop reason: HOSPADM

## 2020-03-26 RX ORDER — PANTOPRAZOLE SODIUM 40 MG/1
40 TABLET, DELAYED RELEASE ORAL
Status: DISCONTINUED | OUTPATIENT
Start: 2020-03-26 | End: 2020-03-26

## 2020-03-26 RX ADMIN — INSULIN GLARGINE 10 UNITS: 100 INJECTION, SOLUTION SUBCUTANEOUS at 09:49

## 2020-03-26 RX ADMIN — HYDROXYZINE HYDROCHLORIDE 25 MG: 25 TABLET ORAL at 12:23

## 2020-03-26 RX ADMIN — POTASSIUM CHLORIDE 20 MEQ: 1500 TABLET, EXTENDED RELEASE ORAL at 12:23

## 2020-03-26 RX ADMIN — FERROUS GLUCONATE 324 MG: 324 TABLET ORAL at 16:29

## 2020-03-26 RX ADMIN — SODIUM BICARBONATE 650 MG TABLET 650 MG: at 09:50

## 2020-03-26 RX ADMIN — SODIUM CHLORIDE 8 MG/HR: 9 INJECTION, SOLUTION INTRAVENOUS at 22:30

## 2020-03-26 RX ADMIN — SODIUM CHLORIDE 8 MG/HR: 9 INJECTION, SOLUTION INTRAVENOUS at 12:24

## 2020-03-26 RX ADMIN — APIXABAN 5 MG: 5 TABLET, FILM COATED ORAL at 09:50

## 2020-03-26 RX ADMIN — PRAVASTATIN SODIUM 40 MG: 40 TABLET ORAL at 16:30

## 2020-03-26 RX ADMIN — FERROUS GLUCONATE 324 MG: 324 TABLET ORAL at 06:28

## 2020-03-26 RX ADMIN — TAMSULOSIN HYDROCHLORIDE 0.4 MG: 0.4 CAPSULE ORAL at 16:30

## 2020-03-26 RX ADMIN — METOPROLOL TARTRATE 12.5 MG: 25 TABLET ORAL at 21:39

## 2020-03-26 RX ADMIN — FUROSEMIDE 80 MG: 10 INJECTION, SOLUTION INTRAMUSCULAR; INTRAVENOUS at 09:50

## 2020-03-26 RX ADMIN — PANTOPRAZOLE SODIUM 40 MG: 40 TABLET, DELAYED RELEASE ORAL at 09:49

## 2020-03-26 RX ADMIN — SODIUM BICARBONATE 650 MG TABLET 650 MG: at 16:30

## 2020-03-26 RX ADMIN — METOPROLOL TARTRATE 12.5 MG: 25 TABLET ORAL at 09:51

## 2020-03-26 RX ADMIN — SODIUM CHLORIDE 80 MG: 9 INJECTION, SOLUTION INTRAVENOUS at 11:52

## 2020-03-26 RX ADMIN — ALLOPURINOL 100 MG: 100 TABLET ORAL at 09:50

## 2020-03-26 NOTE — ANESTHESIA PREPROCEDURE EVALUATION
Review of Systems/Medical History  Patient summary reviewed  Chart reviewed  No history of anesthetic complications     Cardiovascular  EKG reviewed, Pacemaker/AICD, Hyperlipidemia, Hypertension , Valvular heart disease , mitral valve stenosis, Dysrhythmias , atrial fibrillation and 3rd degree block, CHF compensated CHF,   Comment: Pacer 2018 for complete heart block   V paced currently     LEFT VENTRICLE:  Systolic function was normal by visual assessment  Ejection fraction was estimated to be 55 %  There were no regional wall motion abnormalities  Wall thickness was mildly increased  There was mild concentric hypertrophy      LEFT ATRIUM:  The atrium was moderately dilated      MITRAL VALVE:  There was marked annular calcification  There was marked calcification of the posterior leaflet, with mild chordal involvement  There was severely restricted mobility of the posterior leaflet  There was moderate stenosis  Mean gradient of 5 6 mmHg at heart rate of 60 bpm   There was mild regurgitation      AORTIC VALVE:  There was mild stenosis  VMax 2 28 m/s, Mean Gradient 11 50 mmHg, FITO 2 1 cm2, DI 0 66  There was moderate regurgitation  AI PHT 456ms      TRICUSPID VALVE:  There was mild to moderate regurgitation  Estimated peak PA pressure was 59 mmHg    The findings suggest moderate pulmonary hypertension    ,  Pulmonary  Smoker ex-smoker  Cumulative Pack Years: 30, Shortness of breath, Sleep apnea CPAP,   Comment: History of acute hypoxic respiratory failure       GI/Hepatic  Negative GI/hepatic ROS          Chronic kidney disease stage 3,        Endo/Other  Diabetes poorly controlled type 2 Insulin,   Obesity    GYN  Negative gynecology ROS          Hematology  Anemia ,     Musculoskeletal    Comment: Shoulder arthroscopies Arthritis     Neurology  Negative neurology ROS      Psychology   Negative psychology ROS              Physical Exam    Airway    Mallampati score: III  TM Distance: >3 FB  Neck ROM: full Dental   upper dentures,     Cardiovascular  Rhythm: regular, Rate: normal, Murmur,     Pulmonary  Comment:     , Decreased breath sounds,     Other Findings        Anesthesia Plan  ASA Score- 3     Anesthesia Type- IV sedation with anesthesia and general with ASA Monitors  Additional Monitors:   Airway Plan:         Plan Factors-  Patient did not smoke on day of surgery  Induction- intravenous  Postoperative Plan-     Informed Consent- Anesthetic plan and risks discussed with patient

## 2020-03-27 ENCOUNTER — APPOINTMENT (INPATIENT)
Dept: GASTROENTEROLOGY | Facility: HOSPITAL | Age: 65
DRG: 683 | End: 2020-03-27
Payer: MEDICARE

## 2020-03-27 ENCOUNTER — ANESTHESIA (INPATIENT)
Dept: GASTROENTEROLOGY | Facility: HOSPITAL | Age: 65
DRG: 683 | End: 2020-03-27
Payer: MEDICARE

## 2020-03-27 LAB
ABO GROUP BLD BPU: NORMAL
ANION GAP SERPL CALCULATED.3IONS-SCNC: 11 MMOL/L (ref 4–13)
BASOPHILS # BLD AUTO: 0.05 THOUSANDS/ΜL (ref 0–0.1)
BASOPHILS NFR BLD AUTO: 1 % (ref 0–1)
BPU ID: NORMAL
BUN SERPL-MCNC: 98 MG/DL (ref 5–25)
CALCIUM SERPL-MCNC: 9.3 MG/DL (ref 8.3–10.1)
CHLORIDE SERPL-SCNC: 106 MMOL/L (ref 100–108)
CO2 SERPL-SCNC: 22 MMOL/L (ref 21–32)
CREAT SERPL-MCNC: 4.24 MG/DL (ref 0.6–1.3)
CROSSMATCH: NORMAL
EOSINOPHIL # BLD AUTO: 0.95 THOUSAND/ΜL (ref 0–0.61)
EOSINOPHIL NFR BLD AUTO: 10 % (ref 0–6)
ERYTHROCYTE [DISTWIDTH] IN BLOOD BY AUTOMATED COUNT: 17 % (ref 11.6–15.1)
GFR SERPL CREATININE-BSD FRML MDRD: 14 ML/MIN/1.73SQ M
GLUCOSE SERPL-MCNC: 102 MG/DL (ref 65–140)
GLUCOSE SERPL-MCNC: 138 MG/DL (ref 65–140)
GLUCOSE SERPL-MCNC: 155 MG/DL (ref 65–140)
GLUCOSE SERPL-MCNC: 74 MG/DL (ref 65–140)
GLUCOSE SERPL-MCNC: 96 MG/DL (ref 65–140)
HCT VFR BLD AUTO: 32.4 % (ref 36.5–49.3)
HGB BLD-MCNC: 10.7 G/DL (ref 12–17)
IMM GRANULOCYTES # BLD AUTO: 0.03 THOUSAND/UL (ref 0–0.2)
IMM GRANULOCYTES NFR BLD AUTO: 0 % (ref 0–2)
LYMPHOCYTES # BLD AUTO: 1.1 THOUSANDS/ΜL (ref 0.6–4.47)
LYMPHOCYTES NFR BLD AUTO: 11 % (ref 14–44)
MCH RBC QN AUTO: 29.4 PG (ref 26.8–34.3)
MCHC RBC AUTO-ENTMCNC: 33 G/DL (ref 31.4–37.4)
MCV RBC AUTO: 89 FL (ref 82–98)
MONOCYTES # BLD AUTO: 0.82 THOUSAND/ΜL (ref 0.17–1.22)
MONOCYTES NFR BLD AUTO: 8 % (ref 4–12)
NEUTROPHILS # BLD AUTO: 7.02 THOUSANDS/ΜL (ref 1.85–7.62)
NEUTS SEG NFR BLD AUTO: 70 % (ref 43–75)
NRBC BLD AUTO-RTO: 0 /100 WBCS
PLATELET # BLD AUTO: 202 THOUSANDS/UL (ref 149–390)
PMV BLD AUTO: 9.9 FL (ref 8.9–12.7)
POTASSIUM SERPL-SCNC: 3.7 MMOL/L (ref 3.5–5.3)
RBC # BLD AUTO: 3.64 MILLION/UL (ref 3.88–5.62)
SODIUM SERPL-SCNC: 139 MMOL/L (ref 136–145)
UNIT DISPENSE STATUS: NORMAL
UNIT PRODUCT CODE: NORMAL
UNIT RH: NORMAL
WBC # BLD AUTO: 9.97 THOUSAND/UL (ref 4.31–10.16)

## 2020-03-27 PROCEDURE — 0DB78ZX EXCISION OF STOMACH, PYLORUS, VIA NATURAL OR ARTIFICIAL OPENING ENDOSCOPIC, DIAGNOSTIC: ICD-10-PCS | Performed by: INTERNAL MEDICINE

## 2020-03-27 PROCEDURE — 88305 TISSUE EXAM BY PATHOLOGIST: CPT | Performed by: PATHOLOGY

## 2020-03-27 PROCEDURE — 94660 CPAP INITIATION&MGMT: CPT

## 2020-03-27 PROCEDURE — 99233 SBSQ HOSP IP/OBS HIGH 50: CPT | Performed by: NURSE PRACTITIONER

## 2020-03-27 PROCEDURE — 80048 BASIC METABOLIC PNL TOTAL CA: CPT | Performed by: INTERNAL MEDICINE

## 2020-03-27 PROCEDURE — 85025 COMPLETE CBC W/AUTO DIFF WBC: CPT | Performed by: INTERNAL MEDICINE

## 2020-03-27 PROCEDURE — 99232 SBSQ HOSP IP/OBS MODERATE 35: CPT | Performed by: INTERNAL MEDICINE

## 2020-03-27 PROCEDURE — C9113 INJ PANTOPRAZOLE SODIUM, VIA: HCPCS | Performed by: INTERNAL MEDICINE

## 2020-03-27 PROCEDURE — 82948 REAGENT STRIP/BLOOD GLUCOSE: CPT

## 2020-03-27 RX ORDER — SODIUM CHLORIDE 9 MG/ML
INJECTION, SOLUTION INTRAVENOUS CONTINUOUS PRN
Status: DISCONTINUED | OUTPATIENT
Start: 2020-03-27 | End: 2020-03-27 | Stop reason: SURG

## 2020-03-27 RX ORDER — PROPOFOL 10 MG/ML
INJECTION, EMULSION INTRAVENOUS AS NEEDED
Status: DISCONTINUED | OUTPATIENT
Start: 2020-03-27 | End: 2020-03-27 | Stop reason: SURG

## 2020-03-27 RX ORDER — SODIUM CHLORIDE 9 MG/ML
125 INJECTION, SOLUTION INTRAVENOUS CONTINUOUS
Status: DISCONTINUED | OUTPATIENT
Start: 2020-03-27 | End: 2020-03-28

## 2020-03-27 RX ADMIN — SODIUM BICARBONATE 650 MG TABLET 650 MG: at 08:34

## 2020-03-27 RX ADMIN — SODIUM CHLORIDE 125 ML/HR: 0.9 INJECTION, SOLUTION INTRAVENOUS at 13:18

## 2020-03-27 RX ADMIN — SODIUM CHLORIDE: 0.9 INJECTION, SOLUTION INTRAVENOUS at 13:15

## 2020-03-27 RX ADMIN — FERROUS GLUCONATE 324 MG: 324 TABLET ORAL at 06:43

## 2020-03-27 RX ADMIN — METOPROLOL TARTRATE 12.5 MG: 25 TABLET ORAL at 08:34

## 2020-03-27 RX ADMIN — SODIUM BICARBONATE 650 MG TABLET 650 MG: at 17:23

## 2020-03-27 RX ADMIN — INSULIN GLARGINE 10 UNITS: 100 INJECTION, SOLUTION SUBCUTANEOUS at 08:33

## 2020-03-27 RX ADMIN — LIDOCAINE HYDROCHLORIDE 100 MG: 20 INJECTION, SOLUTION INTRAVENOUS at 13:23

## 2020-03-27 RX ADMIN — SODIUM CHLORIDE 8 MG/HR: 9 INJECTION, SOLUTION INTRAVENOUS at 09:47

## 2020-03-27 RX ADMIN — SODIUM CHLORIDE 8 MG/HR: 9 INJECTION, SOLUTION INTRAVENOUS at 22:53

## 2020-03-27 RX ADMIN — FERROUS GLUCONATE 324 MG: 324 TABLET ORAL at 17:24

## 2020-03-27 RX ADMIN — APIXABAN 5 MG: 5 TABLET, FILM COATED ORAL at 15:21

## 2020-03-27 RX ADMIN — PROPOFOL 100 MG: 10 INJECTION, EMULSION INTRAVENOUS at 13:23

## 2020-03-27 RX ADMIN — ALLOPURINOL 100 MG: 100 TABLET ORAL at 08:33

## 2020-03-27 RX ADMIN — METOPROLOL TARTRATE 12.5 MG: 25 TABLET ORAL at 21:15

## 2020-03-27 RX ADMIN — TAMSULOSIN HYDROCHLORIDE 0.4 MG: 0.4 CAPSULE ORAL at 17:23

## 2020-03-27 RX ADMIN — PROPOFOL 100 MG: 10 INJECTION, EMULSION INTRAVENOUS at 13:24

## 2020-03-27 RX ADMIN — PRAVASTATIN SODIUM 40 MG: 40 TABLET ORAL at 17:23

## 2020-03-27 NOTE — ANESTHESIA POSTPROCEDURE EVALUATION
Post-Op Assessment Note    CV Status:  Stable    Pain management: adequate     Mental Status:  Alert and awake   Hydration Status:  Euvolemic   PONV Controlled:  Controlled   Airway Patency:  Patent   Post Op Vitals Reviewed: Yes      Staff: Anesthesiologist, CRNA           /62 (03/27/20 1334)    Temp      Pulse 62 (03/27/20 1334)   Resp 16 (03/27/20 1334)    SpO2 99 % (03/27/20 1334)

## 2020-03-28 LAB
ANION GAP SERPL CALCULATED.3IONS-SCNC: 11 MMOL/L (ref 4–13)
BASOPHILS # BLD AUTO: 0.06 THOUSANDS/ΜL (ref 0–0.1)
BASOPHILS NFR BLD AUTO: 1 % (ref 0–1)
BUN SERPL-MCNC: 93 MG/DL (ref 5–25)
CALCIUM SERPL-MCNC: 9.1 MG/DL (ref 8.3–10.1)
CHLORIDE SERPL-SCNC: 106 MMOL/L (ref 100–108)
CO2 SERPL-SCNC: 23 MMOL/L (ref 21–32)
CREAT SERPL-MCNC: 3.88 MG/DL (ref 0.6–1.3)
EOSINOPHIL # BLD AUTO: 0.86 THOUSAND/ΜL (ref 0–0.61)
EOSINOPHIL NFR BLD AUTO: 8 % (ref 0–6)
ERYTHROCYTE [DISTWIDTH] IN BLOOD BY AUTOMATED COUNT: 16.9 % (ref 11.6–15.1)
GFR SERPL CREATININE-BSD FRML MDRD: 15 ML/MIN/1.73SQ M
GLUCOSE SERPL-MCNC: 108 MG/DL (ref 65–140)
GLUCOSE SERPL-MCNC: 122 MG/DL (ref 65–140)
GLUCOSE SERPL-MCNC: 162 MG/DL (ref 65–140)
GLUCOSE SERPL-MCNC: 86 MG/DL (ref 65–140)
GLUCOSE SERPL-MCNC: 95 MG/DL (ref 65–140)
HCT VFR BLD AUTO: 31.9 % (ref 36.5–49.3)
HGB BLD-MCNC: 10.3 G/DL (ref 12–17)
IMM GRANULOCYTES # BLD AUTO: 0.03 THOUSAND/UL (ref 0–0.2)
IMM GRANULOCYTES NFR BLD AUTO: 0 % (ref 0–2)
LYMPHOCYTES # BLD AUTO: 1.01 THOUSANDS/ΜL (ref 0.6–4.47)
LYMPHOCYTES NFR BLD AUTO: 10 % (ref 14–44)
MCH RBC QN AUTO: 29.3 PG (ref 26.8–34.3)
MCHC RBC AUTO-ENTMCNC: 32.3 G/DL (ref 31.4–37.4)
MCV RBC AUTO: 91 FL (ref 82–98)
MONOCYTES # BLD AUTO: 0.76 THOUSAND/ΜL (ref 0.17–1.22)
MONOCYTES NFR BLD AUTO: 7 % (ref 4–12)
NEUTROPHILS # BLD AUTO: 7.61 THOUSANDS/ΜL (ref 1.85–7.62)
NEUTS SEG NFR BLD AUTO: 74 % (ref 43–75)
NRBC BLD AUTO-RTO: 0 /100 WBCS
PLATELET # BLD AUTO: 193 THOUSANDS/UL (ref 149–390)
PMV BLD AUTO: 9.6 FL (ref 8.9–12.7)
POTASSIUM SERPL-SCNC: 3.6 MMOL/L (ref 3.5–5.3)
RBC # BLD AUTO: 3.52 MILLION/UL (ref 3.88–5.62)
SODIUM SERPL-SCNC: 140 MMOL/L (ref 136–145)
WBC # BLD AUTO: 10.33 THOUSAND/UL (ref 4.31–10.16)

## 2020-03-28 PROCEDURE — 85025 COMPLETE CBC W/AUTO DIFF WBC: CPT | Performed by: INTERNAL MEDICINE

## 2020-03-28 PROCEDURE — 99232 SBSQ HOSP IP/OBS MODERATE 35: CPT | Performed by: INTERNAL MEDICINE

## 2020-03-28 PROCEDURE — 80048 BASIC METABOLIC PNL TOTAL CA: CPT | Performed by: INTERNAL MEDICINE

## 2020-03-28 PROCEDURE — 82948 REAGENT STRIP/BLOOD GLUCOSE: CPT

## 2020-03-28 RX ORDER — AMLODIPINE BESYLATE 2.5 MG/1
2.5 TABLET ORAL EVERY EVENING
Status: DISCONTINUED | OUTPATIENT
Start: 2020-03-28 | End: 2020-03-28

## 2020-03-28 RX ORDER — AMLODIPINE BESYLATE 5 MG/1
5 TABLET ORAL DAILY
Status: DISCONTINUED | OUTPATIENT
Start: 2020-03-28 | End: 2020-03-29 | Stop reason: HOSPADM

## 2020-03-28 RX ADMIN — SODIUM BICARBONATE 650 MG TABLET 650 MG: at 10:07

## 2020-03-28 RX ADMIN — AMLODIPINE BESYLATE 5 MG: 5 TABLET ORAL at 17:44

## 2020-03-28 RX ADMIN — METOPROLOL TARTRATE 12.5 MG: 25 TABLET ORAL at 21:20

## 2020-03-28 RX ADMIN — METOPROLOL TARTRATE 12.5 MG: 25 TABLET ORAL at 10:06

## 2020-03-28 RX ADMIN — TAMSULOSIN HYDROCHLORIDE 0.4 MG: 0.4 CAPSULE ORAL at 17:44

## 2020-03-28 RX ADMIN — ALLOPURINOL 100 MG: 100 TABLET ORAL at 10:05

## 2020-03-28 RX ADMIN — APIXABAN 5 MG: 5 TABLET, FILM COATED ORAL at 17:44

## 2020-03-28 RX ADMIN — PRAVASTATIN SODIUM 40 MG: 40 TABLET ORAL at 17:44

## 2020-03-28 RX ADMIN — INSULIN GLARGINE 10 UNITS: 100 INJECTION, SOLUTION SUBCUTANEOUS at 10:07

## 2020-03-28 RX ADMIN — SODIUM BICARBONATE 650 MG TABLET 650 MG: at 17:44

## 2020-03-28 RX ADMIN — FERROUS GLUCONATE 324 MG: 324 TABLET ORAL at 17:46

## 2020-03-28 RX ADMIN — INSULIN LISPRO 1 UNITS: 100 INJECTION, SOLUTION INTRAVENOUS; SUBCUTANEOUS at 13:24

## 2020-03-28 RX ADMIN — APIXABAN 5 MG: 5 TABLET, FILM COATED ORAL at 06:32

## 2020-03-28 RX ADMIN — FERROUS GLUCONATE 324 MG: 324 TABLET ORAL at 06:32

## 2020-03-29 VITALS
DIASTOLIC BLOOD PRESSURE: 71 MMHG | TEMPERATURE: 97.6 F | HEART RATE: 78 BPM | HEIGHT: 70 IN | BODY MASS INDEX: 26.16 KG/M2 | OXYGEN SATURATION: 98 % | RESPIRATION RATE: 18 BRPM | WEIGHT: 182.76 LBS | SYSTOLIC BLOOD PRESSURE: 171 MMHG

## 2020-03-29 LAB
ANION GAP SERPL CALCULATED.3IONS-SCNC: 10 MMOL/L (ref 4–13)
BASOPHILS # BLD AUTO: 0.05 THOUSANDS/ΜL (ref 0–0.1)
BASOPHILS NFR BLD AUTO: 1 % (ref 0–1)
BUN SERPL-MCNC: 87 MG/DL (ref 5–25)
CALCIUM SERPL-MCNC: 8.9 MG/DL (ref 8.3–10.1)
CHLORIDE SERPL-SCNC: 108 MMOL/L (ref 100–108)
CO2 SERPL-SCNC: 24 MMOL/L (ref 21–32)
CREAT SERPL-MCNC: 3.64 MG/DL (ref 0.6–1.3)
EOSINOPHIL # BLD AUTO: 0.77 THOUSAND/ΜL (ref 0–0.61)
EOSINOPHIL NFR BLD AUTO: 9 % (ref 0–6)
ERYTHROCYTE [DISTWIDTH] IN BLOOD BY AUTOMATED COUNT: 16.3 % (ref 11.6–15.1)
GFR SERPL CREATININE-BSD FRML MDRD: 16 ML/MIN/1.73SQ M
GLUCOSE SERPL-MCNC: 101 MG/DL (ref 65–140)
GLUCOSE SERPL-MCNC: 175 MG/DL (ref 65–140)
GLUCOSE SERPL-MCNC: 69 MG/DL (ref 65–140)
HCT VFR BLD AUTO: 29.3 % (ref 36.5–49.3)
HGB BLD-MCNC: 9.5 G/DL (ref 12–17)
IMM GRANULOCYTES # BLD AUTO: 0.03 THOUSAND/UL (ref 0–0.2)
IMM GRANULOCYTES NFR BLD AUTO: 0 % (ref 0–2)
LYMPHOCYTES # BLD AUTO: 1.03 THOUSANDS/ΜL (ref 0.6–4.47)
LYMPHOCYTES NFR BLD AUTO: 12 % (ref 14–44)
MCH RBC QN AUTO: 29.2 PG (ref 26.8–34.3)
MCHC RBC AUTO-ENTMCNC: 32.4 G/DL (ref 31.4–37.4)
MCV RBC AUTO: 90 FL (ref 82–98)
MONOCYTES # BLD AUTO: 0.72 THOUSAND/ΜL (ref 0.17–1.22)
MONOCYTES NFR BLD AUTO: 8 % (ref 4–12)
NEUTROPHILS # BLD AUTO: 6.25 THOUSANDS/ΜL (ref 1.85–7.62)
NEUTS SEG NFR BLD AUTO: 70 % (ref 43–75)
NRBC BLD AUTO-RTO: 0 /100 WBCS
PLATELET # BLD AUTO: 197 THOUSANDS/UL (ref 149–390)
PMV BLD AUTO: 9.2 FL (ref 8.9–12.7)
POTASSIUM SERPL-SCNC: 3.7 MMOL/L (ref 3.5–5.3)
RBC # BLD AUTO: 3.25 MILLION/UL (ref 3.88–5.62)
SODIUM SERPL-SCNC: 142 MMOL/L (ref 136–145)
WBC # BLD AUTO: 8.85 THOUSAND/UL (ref 4.31–10.16)

## 2020-03-29 PROCEDURE — 80048 BASIC METABOLIC PNL TOTAL CA: CPT | Performed by: INTERNAL MEDICINE

## 2020-03-29 PROCEDURE — 85025 COMPLETE CBC W/AUTO DIFF WBC: CPT | Performed by: INTERNAL MEDICINE

## 2020-03-29 PROCEDURE — 99239 HOSP IP/OBS DSCHRG MGMT >30: CPT | Performed by: INTERNAL MEDICINE

## 2020-03-29 PROCEDURE — 82948 REAGENT STRIP/BLOOD GLUCOSE: CPT

## 2020-03-29 PROCEDURE — 94660 CPAP INITIATION&MGMT: CPT

## 2020-03-29 RX ORDER — FUROSEMIDE 40 MG/1
40 TABLET ORAL DAILY
Status: ON HOLD | COMMUNITY
End: 2020-03-29 | Stop reason: SDUPTHER

## 2020-03-29 RX ORDER — METOLAZONE 5 MG/1
5 TABLET ORAL DAILY PRN
COMMUNITY
End: 2020-03-29 | Stop reason: HOSPADM

## 2020-03-29 RX ORDER — SODIUM BICARBONATE 650 MG/1
650 TABLET ORAL
Qty: 60 TABLET | Refills: 0 | Status: SHIPPED | OUTPATIENT
Start: 2020-03-29 | End: 2020-04-09 | Stop reason: SDUPTHER

## 2020-03-29 RX ORDER — METOPROLOL TARTRATE 50 MG/1
25 TABLET, FILM COATED ORAL EVERY 12 HOURS SCHEDULED
Qty: 60 TABLET | Refills: 0 | Status: SHIPPED | OUTPATIENT
Start: 2020-03-29 | End: 2020-08-19 | Stop reason: SDUPTHER

## 2020-03-29 RX ORDER — FUROSEMIDE 40 MG/1
40 TABLET ORAL DAILY PRN
Qty: 20 TABLET | Refills: 0 | Status: SHIPPED | OUTPATIENT
Start: 2020-03-29 | End: 2020-03-29 | Stop reason: HOSPADM

## 2020-03-29 RX ORDER — LOSARTAN POTASSIUM 25 MG/1
25 TABLET ORAL DAILY
COMMUNITY
End: 2020-03-29 | Stop reason: HOSPADM

## 2020-03-29 RX ORDER — FUROSEMIDE 40 MG/1
40 TABLET ORAL DAILY PRN
Qty: 30 TABLET | Refills: 0 | Status: SHIPPED | OUTPATIENT
Start: 2020-03-29 | End: 2020-05-19

## 2020-03-29 RX ADMIN — APIXABAN 5 MG: 5 TABLET, FILM COATED ORAL at 06:16

## 2020-03-29 RX ADMIN — INSULIN GLARGINE 10 UNITS: 100 INJECTION, SOLUTION SUBCUTANEOUS at 09:26

## 2020-03-29 RX ADMIN — FERROUS GLUCONATE 324 MG: 324 TABLET ORAL at 06:16

## 2020-03-29 RX ADMIN — INSULIN LISPRO 1 UNITS: 100 INJECTION, SOLUTION INTRAVENOUS; SUBCUTANEOUS at 09:24

## 2020-03-29 RX ADMIN — AMLODIPINE BESYLATE 5 MG: 5 TABLET ORAL at 09:17

## 2020-03-29 RX ADMIN — METOPROLOL TARTRATE 12.5 MG: 25 TABLET ORAL at 09:17

## 2020-03-29 RX ADMIN — SODIUM BICARBONATE 650 MG TABLET 650 MG: at 09:18

## 2020-03-29 RX ADMIN — ALLOPURINOL 100 MG: 100 TABLET ORAL at 09:17

## 2020-03-30 ENCOUNTER — TELEPHONE (OUTPATIENT)
Dept: NEPHROLOGY | Facility: CLINIC | Age: 65
End: 2020-03-30

## 2020-03-31 DIAGNOSIS — R42 VERTIGO: ICD-10-CM

## 2020-03-31 RX ORDER — MECLIZINE HYDROCHLORIDE 25 MG/1
TABLET ORAL
Qty: 30 TABLET | Refills: 1 | OUTPATIENT
Start: 2020-03-31

## 2020-04-01 ENCOUNTER — TELEPHONE (OUTPATIENT)
Dept: PULMONOLOGY | Facility: CLINIC | Age: 65
End: 2020-04-01

## 2020-04-02 LAB — MISCELLANEOUS LAB TEST RESULT: NORMAL

## 2020-04-06 ENCOUNTER — APPOINTMENT (OUTPATIENT)
Dept: LAB | Facility: CLINIC | Age: 65
End: 2020-04-06
Payer: MEDICARE

## 2020-04-06 ENCOUNTER — TRANSITIONAL CARE MANAGEMENT (OUTPATIENT)
Dept: FAMILY MEDICINE CLINIC | Facility: CLINIC | Age: 65
End: 2020-04-06

## 2020-04-06 DIAGNOSIS — E11.40 TYPE 2 DIABETES MELLITUS WITH DIABETIC NEUROPATHY, WITHOUT LONG-TERM CURRENT USE OF INSULIN (HCC): ICD-10-CM

## 2020-04-06 LAB
ANION GAP SERPL CALCULATED.3IONS-SCNC: 6 MMOL/L (ref 4–13)
BACTERIA UR QL AUTO: ABNORMAL /HPF
BASOPHILS # BLD AUTO: 0.09 THOUSANDS/ΜL (ref 0–0.1)
BASOPHILS NFR BLD AUTO: 1 % (ref 0–1)
BILIRUB UR QL STRIP: NEGATIVE
BUN SERPL-MCNC: 54 MG/DL (ref 5–25)
CALCIUM SERPL-MCNC: 9.8 MG/DL (ref 8.3–10.1)
CHLORIDE SERPL-SCNC: 108 MMOL/L (ref 100–108)
CLARITY UR: CLEAR
CO2 SERPL-SCNC: 24 MMOL/L (ref 21–32)
COLOR UR: YELLOW
CREAT SERPL-MCNC: 2.19 MG/DL (ref 0.6–1.3)
CREAT UR-MCNC: 86.3 MG/DL
EOSINOPHIL # BLD AUTO: 0.62 THOUSAND/ΜL (ref 0–0.61)
EOSINOPHIL NFR BLD AUTO: 6 % (ref 0–6)
ERYTHROCYTE [DISTWIDTH] IN BLOOD BY AUTOMATED COUNT: 15.9 % (ref 11.6–15.1)
GFR SERPL CREATININE-BSD FRML MDRD: 30 ML/MIN/1.73SQ M
GLUCOSE P FAST SERPL-MCNC: 86 MG/DL (ref 65–99)
GLUCOSE UR STRIP-MCNC: NEGATIVE MG/DL
HCT VFR BLD AUTO: 33.1 % (ref 36.5–49.3)
HGB BLD-MCNC: 10.8 G/DL (ref 12–17)
HGB UR QL STRIP.AUTO: ABNORMAL
HYALINE CASTS #/AREA URNS LPF: ABNORMAL /LPF
IMM GRANULOCYTES # BLD AUTO: 0.05 THOUSAND/UL (ref 0–0.2)
IMM GRANULOCYTES NFR BLD AUTO: 1 % (ref 0–2)
KETONES UR STRIP-MCNC: NEGATIVE MG/DL
LEUKOCYTE ESTERASE UR QL STRIP: NEGATIVE
LYMPHOCYTES # BLD AUTO: 1.32 THOUSANDS/ΜL (ref 0.6–4.47)
LYMPHOCYTES NFR BLD AUTO: 13 % (ref 14–44)
MCH RBC QN AUTO: 28.6 PG (ref 26.8–34.3)
MCHC RBC AUTO-ENTMCNC: 32.6 G/DL (ref 31.4–37.4)
MCV RBC AUTO: 88 FL (ref 82–98)
MONOCYTES # BLD AUTO: 0.73 THOUSAND/ΜL (ref 0.17–1.22)
MONOCYTES NFR BLD AUTO: 7 % (ref 4–12)
NEUTROPHILS # BLD AUTO: 7.66 THOUSANDS/ΜL (ref 1.85–7.62)
NEUTS SEG NFR BLD AUTO: 72 % (ref 43–75)
NITRITE UR QL STRIP: NEGATIVE
NON-SQ EPI CELLS URNS QL MICRO: ABNORMAL /HPF
NRBC BLD AUTO-RTO: 0 /100 WBCS
PH UR STRIP.AUTO: 6.5 [PH]
PLATELET # BLD AUTO: 274 THOUSANDS/UL (ref 149–390)
PMV BLD AUTO: 10.2 FL (ref 8.9–12.7)
POTASSIUM SERPL-SCNC: 4.7 MMOL/L (ref 3.5–5.3)
PROT UR STRIP-MCNC: ABNORMAL MG/DL
PROT UR-MCNC: 727 MG/DL
PROT/CREAT UR: 8.42 MG/G{CREAT} (ref 0–0.1)
RBC # BLD AUTO: 3.78 MILLION/UL (ref 3.88–5.62)
RBC #/AREA URNS AUTO: ABNORMAL /HPF
SODIUM SERPL-SCNC: 138 MMOL/L (ref 136–145)
SP GR UR STRIP.AUTO: 1.02 (ref 1–1.03)
UROBILINOGEN UR QL STRIP.AUTO: 0.2 E.U./DL
WBC # BLD AUTO: 10.47 THOUSAND/UL (ref 4.31–10.16)
WBC #/AREA URNS AUTO: ABNORMAL /HPF

## 2020-04-06 PROCEDURE — 85025 COMPLETE CBC W/AUTO DIFF WBC: CPT

## 2020-04-06 PROCEDURE — 81001 URINALYSIS AUTO W/SCOPE: CPT | Performed by: INTERNAL MEDICINE

## 2020-04-06 PROCEDURE — 84156 ASSAY OF PROTEIN URINE: CPT | Performed by: INTERNAL MEDICINE

## 2020-04-06 PROCEDURE — 82570 ASSAY OF URINE CREATININE: CPT | Performed by: INTERNAL MEDICINE

## 2020-04-06 PROCEDURE — 36415 COLL VENOUS BLD VENIPUNCTURE: CPT

## 2020-04-06 PROCEDURE — 80048 BASIC METABOLIC PNL TOTAL CA: CPT

## 2020-04-07 ENCOUNTER — TELEMEDICINE (OUTPATIENT)
Dept: FAMILY MEDICINE CLINIC | Facility: CLINIC | Age: 65
End: 2020-04-07
Payer: MEDICARE

## 2020-04-07 DIAGNOSIS — D50.9 IRON DEFICIENCY ANEMIA, UNSPECIFIED IRON DEFICIENCY ANEMIA TYPE: ICD-10-CM

## 2020-04-07 DIAGNOSIS — I48.0 PAF (PAROXYSMAL ATRIAL FIBRILLATION) (HCC): ICD-10-CM

## 2020-04-07 DIAGNOSIS — E11.40 TYPE 2 DIABETES MELLITUS WITH DIABETIC NEUROPATHY, WITHOUT LONG-TERM CURRENT USE OF INSULIN (HCC): ICD-10-CM

## 2020-04-07 DIAGNOSIS — N17.9 ACUTE RENAL FAILURE, UNSPECIFIED ACUTE RENAL FAILURE TYPE (HCC): Primary | ICD-10-CM

## 2020-04-07 DIAGNOSIS — Z79.4 TYPE 2 DIABETES MELLITUS WITH STAGE 3 CHRONIC KIDNEY DISEASE, WITH LONG-TERM CURRENT USE OF INSULIN (HCC): ICD-10-CM

## 2020-04-07 DIAGNOSIS — N18.30 TYPE 2 DIABETES MELLITUS WITH STAGE 3 CHRONIC KIDNEY DISEASE, WITH LONG-TERM CURRENT USE OF INSULIN (HCC): ICD-10-CM

## 2020-04-07 DIAGNOSIS — M86.9 OSTEOMYELITIS OF LEFT FOOT, UNSPECIFIED TYPE (HCC): ICD-10-CM

## 2020-04-07 DIAGNOSIS — E11.22 TYPE 2 DIABETES MELLITUS WITH STAGE 3 CHRONIC KIDNEY DISEASE, WITH LONG-TERM CURRENT USE OF INSULIN (HCC): ICD-10-CM

## 2020-04-07 DIAGNOSIS — I10 ESSENTIAL HYPERTENSION: ICD-10-CM

## 2020-04-07 PROCEDURE — 99495 TRANSJ CARE MGMT MOD F2F 14D: CPT | Performed by: FAMILY MEDICINE

## 2020-04-08 ENCOUNTER — TELEMEDICINE (OUTPATIENT)
Dept: NEPHROLOGY | Facility: CLINIC | Age: 65
End: 2020-04-08

## 2020-04-08 DIAGNOSIS — E11.22 TYPE 2 DIABETES MELLITUS WITH STAGE 3 CHRONIC KIDNEY DISEASE, WITH LONG-TERM CURRENT USE OF INSULIN (HCC): ICD-10-CM

## 2020-04-08 DIAGNOSIS — Z79.4 TYPE 2 DIABETES MELLITUS WITH STAGE 3 CHRONIC KIDNEY DISEASE, WITH LONG-TERM CURRENT USE OF INSULIN (HCC): ICD-10-CM

## 2020-04-08 DIAGNOSIS — I50.32 CHRONIC DIASTOLIC (CONGESTIVE) HEART FAILURE (HCC): ICD-10-CM

## 2020-04-08 DIAGNOSIS — E11.40 TYPE 2 DIABETES MELLITUS WITH DIABETIC NEUROPATHY, WITHOUT LONG-TERM CURRENT USE OF INSULIN (HCC): Primary | ICD-10-CM

## 2020-04-08 DIAGNOSIS — N18.30 TYPE 2 DIABETES MELLITUS WITH STAGE 3 CHRONIC KIDNEY DISEASE, WITH LONG-TERM CURRENT USE OF INSULIN (HCC): ICD-10-CM

## 2020-04-08 DIAGNOSIS — N18.30 STAGE 3 CHRONIC KIDNEY DISEASE (HCC): ICD-10-CM

## 2020-04-08 DIAGNOSIS — I70.1 LEFT RENAL ARTERY STENOSIS (HCC): ICD-10-CM

## 2020-04-08 DIAGNOSIS — I10 ESSENTIAL HYPERTENSION: ICD-10-CM

## 2020-04-08 DIAGNOSIS — M86.9 OSTEOMYELITIS OF LEFT FOOT, UNSPECIFIED TYPE (HCC): ICD-10-CM

## 2020-04-08 DIAGNOSIS — N17.9 ACUTE RENAL FAILURE, UNSPECIFIED ACUTE RENAL FAILURE TYPE (HCC): ICD-10-CM

## 2020-04-08 PROCEDURE — 99443 PR PHYS/QHP TELEPHONE EVALUATION 21-30 MIN: CPT | Performed by: NURSE PRACTITIONER

## 2020-04-09 ENCOUNTER — TELEPHONE (OUTPATIENT)
Dept: CARDIOLOGY CLINIC | Facility: CLINIC | Age: 65
End: 2020-04-09

## 2020-04-09 DIAGNOSIS — M86.9 OSTEOMYELITIS OF LEFT FOOT, UNSPECIFIED TYPE (HCC): ICD-10-CM

## 2020-04-09 RX ORDER — SODIUM BICARBONATE 650 MG/1
650 TABLET ORAL
Qty: 60 TABLET | Refills: 0 | Status: SHIPPED | OUTPATIENT
Start: 2020-04-09 | End: 2020-05-07 | Stop reason: SDUPTHER

## 2020-04-09 RX ORDER — SODIUM BICARBONATE 650 MG/1
650 TABLET ORAL
Qty: 60 TABLET | Refills: 0 | Status: SHIPPED | OUTPATIENT
Start: 2020-04-09 | End: 2020-04-09 | Stop reason: SDUPTHER

## 2020-04-13 ENCOUNTER — APPOINTMENT (OUTPATIENT)
Dept: LAB | Facility: CLINIC | Age: 65
End: 2020-04-13
Payer: MEDICARE

## 2020-04-13 DIAGNOSIS — N18.30 STAGE 3 CHRONIC KIDNEY DISEASE (HCC): ICD-10-CM

## 2020-04-13 LAB — PHOSPHATE SERPL-MCNC: 4.2 MG/DL (ref 2.3–4.1)

## 2020-04-13 PROCEDURE — 84100 ASSAY OF PHOSPHORUS: CPT

## 2020-04-14 ENCOUNTER — TELEPHONE (OUTPATIENT)
Dept: NEPHROLOGY | Facility: CLINIC | Age: 65
End: 2020-04-14

## 2020-04-14 ENCOUNTER — TELEMEDICINE (OUTPATIENT)
Dept: CARDIOLOGY CLINIC | Facility: CLINIC | Age: 65
End: 2020-04-14
Payer: MEDICARE

## 2020-04-14 VITALS
WEIGHT: 194 LBS | HEIGHT: 70 IN | DIASTOLIC BLOOD PRESSURE: 67 MMHG | BODY MASS INDEX: 27.77 KG/M2 | SYSTOLIC BLOOD PRESSURE: 138 MMHG | HEART RATE: 74 BPM

## 2020-04-14 DIAGNOSIS — I34.2 NONRHEUMATIC MITRAL VALVE STENOSIS: ICD-10-CM

## 2020-04-14 DIAGNOSIS — E78.2 MIXED HYPERLIPIDEMIA: ICD-10-CM

## 2020-04-14 DIAGNOSIS — I44.2 COMPLETE HEART BLOCK (HCC): ICD-10-CM

## 2020-04-14 DIAGNOSIS — I10 ESSENTIAL HYPERTENSION: ICD-10-CM

## 2020-04-14 DIAGNOSIS — I35.0 NONRHEUMATIC AORTIC VALVE STENOSIS: ICD-10-CM

## 2020-04-14 DIAGNOSIS — I48.0 PAF (PAROXYSMAL ATRIAL FIBRILLATION) (HCC): ICD-10-CM

## 2020-04-14 DIAGNOSIS — I10 ESSENTIAL HYPERTENSION: Primary | ICD-10-CM

## 2020-04-14 DIAGNOSIS — I50.32 CHRONIC DIASTOLIC (CONGESTIVE) HEART FAILURE (HCC): Primary | ICD-10-CM

## 2020-04-14 DIAGNOSIS — N18.30 STAGE 3 CHRONIC KIDNEY DISEASE (HCC): ICD-10-CM

## 2020-04-14 PROCEDURE — 99213 OFFICE O/P EST LOW 20 MIN: CPT | Performed by: INTERNAL MEDICINE

## 2020-04-20 DIAGNOSIS — D64.9 ANEMIA: ICD-10-CM

## 2020-04-20 RX ORDER — DOXYCYCLINE HYCLATE 50 MG/1
324 CAPSULE, GELATIN COATED ORAL
Qty: 30 TABLET | Refills: 0 | Status: SHIPPED | OUTPATIENT
Start: 2020-04-20 | End: 2020-05-07 | Stop reason: SDUPTHER

## 2020-04-28 ENCOUNTER — APPOINTMENT (OUTPATIENT)
Dept: LAB | Facility: CLINIC | Age: 65
End: 2020-04-28
Payer: MEDICARE

## 2020-04-28 DIAGNOSIS — N18.30 STAGE 3 CHRONIC KIDNEY DISEASE (HCC): ICD-10-CM

## 2020-04-28 DIAGNOSIS — I10 ESSENTIAL HYPERTENSION: ICD-10-CM

## 2020-04-28 LAB
ANION GAP SERPL CALCULATED.3IONS-SCNC: 5 MMOL/L (ref 4–13)
BACTERIA UR QL AUTO: ABNORMAL /HPF
BILIRUB UR QL STRIP: NEGATIVE
BUN SERPL-MCNC: 35 MG/DL (ref 5–25)
CALCIUM SERPL-MCNC: 9.1 MG/DL (ref 8.3–10.1)
CHLORIDE SERPL-SCNC: 109 MMOL/L (ref 100–108)
CLARITY UR: CLEAR
CO2 SERPL-SCNC: 23 MMOL/L (ref 21–32)
COLOR UR: YELLOW
CREAT SERPL-MCNC: 2 MG/DL (ref 0.6–1.3)
CREAT UR-MCNC: 55.7 MG/DL
GFR SERPL CREATININE-BSD FRML MDRD: 34 ML/MIN/1.73SQ M
GLUCOSE P FAST SERPL-MCNC: 87 MG/DL (ref 65–99)
GLUCOSE UR STRIP-MCNC: ABNORMAL MG/DL
HGB UR QL STRIP.AUTO: ABNORMAL
HYALINE CASTS #/AREA URNS LPF: ABNORMAL /LPF
KETONES UR STRIP-MCNC: NEGATIVE MG/DL
LEUKOCYTE ESTERASE UR QL STRIP: NEGATIVE
NITRITE UR QL STRIP: NEGATIVE
NON-SQ EPI CELLS URNS QL MICRO: ABNORMAL /HPF
PH UR STRIP.AUTO: 6.5 [PH]
POTASSIUM SERPL-SCNC: 4.8 MMOL/L (ref 3.5–5.3)
PROT UR STRIP-MCNC: ABNORMAL MG/DL
PROT UR-MCNC: 502 MG/DL
PROT/CREAT UR: 9.01 MG/G{CREAT} (ref 0–0.1)
RBC #/AREA URNS AUTO: ABNORMAL /HPF
SODIUM SERPL-SCNC: 137 MMOL/L (ref 136–145)
SP GR UR STRIP.AUTO: 1.01 (ref 1–1.03)
UROBILINOGEN UR QL STRIP.AUTO: 0.2 E.U./DL
WBC #/AREA URNS AUTO: ABNORMAL /HPF

## 2020-04-28 PROCEDURE — 84156 ASSAY OF PROTEIN URINE: CPT

## 2020-04-28 PROCEDURE — 81001 URINALYSIS AUTO W/SCOPE: CPT

## 2020-04-28 PROCEDURE — 80048 BASIC METABOLIC PNL TOTAL CA: CPT

## 2020-04-28 PROCEDURE — 36415 COLL VENOUS BLD VENIPUNCTURE: CPT

## 2020-04-28 PROCEDURE — 82570 ASSAY OF URINE CREATININE: CPT

## 2020-05-06 ENCOUNTER — APPOINTMENT (OUTPATIENT)
Dept: LAB | Facility: CLINIC | Age: 65
End: 2020-05-06
Payer: MEDICARE

## 2020-05-06 DIAGNOSIS — N18.30 TYPE 2 DIABETES MELLITUS WITH STAGE 3 CHRONIC KIDNEY DISEASE, WITH LONG-TERM CURRENT USE OF INSULIN (HCC): ICD-10-CM

## 2020-05-06 DIAGNOSIS — E11.22 TYPE 2 DIABETES MELLITUS WITH STAGE 3 CHRONIC KIDNEY DISEASE, WITH LONG-TERM CURRENT USE OF INSULIN (HCC): ICD-10-CM

## 2020-05-06 DIAGNOSIS — E78.2 MIXED HYPERLIPIDEMIA: ICD-10-CM

## 2020-05-06 DIAGNOSIS — Z79.4 TYPE 2 DIABETES MELLITUS WITH STAGE 3 CHRONIC KIDNEY DISEASE, WITH LONG-TERM CURRENT USE OF INSULIN (HCC): ICD-10-CM

## 2020-05-06 DIAGNOSIS — I10 ESSENTIAL HYPERTENSION: ICD-10-CM

## 2020-05-06 LAB
ALBUMIN SERPL BCP-MCNC: 2.9 G/DL (ref 3.5–5)
ALP SERPL-CCNC: 106 U/L (ref 46–116)
ALT SERPL W P-5'-P-CCNC: 40 U/L (ref 12–78)
ANION GAP SERPL CALCULATED.3IONS-SCNC: 5 MMOL/L (ref 4–13)
AST SERPL W P-5'-P-CCNC: 32 U/L (ref 5–45)
BILIRUB SERPL-MCNC: 0.4 MG/DL (ref 0.2–1)
BUN SERPL-MCNC: 33 MG/DL (ref 5–25)
CALCIUM SERPL-MCNC: 9.1 MG/DL (ref 8.3–10.1)
CHLORIDE SERPL-SCNC: 108 MMOL/L (ref 100–108)
CHOLEST SERPL-MCNC: 132 MG/DL (ref 50–200)
CO2 SERPL-SCNC: 26 MMOL/L (ref 21–32)
CREAT SERPL-MCNC: 2.13 MG/DL (ref 0.6–1.3)
EST. AVERAGE GLUCOSE BLD GHB EST-MCNC: 111 MG/DL
GFR SERPL CREATININE-BSD FRML MDRD: 31 ML/MIN/1.73SQ M
GLUCOSE P FAST SERPL-MCNC: 96 MG/DL (ref 65–99)
HBA1C MFR BLD: 5.5 %
HDLC SERPL-MCNC: 37 MG/DL
LDLC SERPL CALC-MCNC: 73 MG/DL (ref 0–100)
POTASSIUM SERPL-SCNC: 4.6 MMOL/L (ref 3.5–5.3)
PROT SERPL-MCNC: 7.2 G/DL (ref 6.4–8.2)
SODIUM SERPL-SCNC: 139 MMOL/L (ref 136–145)
TRIGL SERPL-MCNC: 108 MG/DL

## 2020-05-06 PROCEDURE — 80053 COMPREHEN METABOLIC PANEL: CPT

## 2020-05-06 PROCEDURE — 36415 COLL VENOUS BLD VENIPUNCTURE: CPT

## 2020-05-06 PROCEDURE — 80061 LIPID PANEL: CPT

## 2020-05-06 PROCEDURE — 83036 HEMOGLOBIN GLYCOSYLATED A1C: CPT

## 2020-05-07 ENCOUNTER — TELEPHONE (OUTPATIENT)
Dept: ENDOCRINOLOGY | Facility: CLINIC | Age: 65
End: 2020-05-07

## 2020-05-07 DIAGNOSIS — D64.9 ANEMIA: ICD-10-CM

## 2020-05-07 DIAGNOSIS — M86.9 OSTEOMYELITIS OF LEFT FOOT, UNSPECIFIED TYPE (HCC): ICD-10-CM

## 2020-05-07 RX ORDER — SODIUM BICARBONATE 650 MG/1
650 TABLET ORAL
Qty: 60 TABLET | Refills: 0 | Status: SHIPPED | OUTPATIENT
Start: 2020-05-07 | End: 2020-05-15

## 2020-05-07 RX ORDER — DOXYCYCLINE HYCLATE 50 MG/1
324 CAPSULE, GELATIN COATED ORAL
Qty: 30 TABLET | Refills: 0 | Status: SHIPPED | OUTPATIENT
Start: 2020-05-07 | End: 2020-05-19

## 2020-05-12 ENCOUNTER — TELEPHONE (OUTPATIENT)
Dept: NEPHROLOGY | Facility: CLINIC | Age: 65
End: 2020-05-12

## 2020-05-12 DIAGNOSIS — N18.30 STAGE 3 CHRONIC KIDNEY DISEASE (HCC): ICD-10-CM

## 2020-05-12 DIAGNOSIS — I10 ESSENTIAL HYPERTENSION: Primary | ICD-10-CM

## 2020-05-12 DIAGNOSIS — N17.9 AKI (ACUTE KIDNEY INJURY) (HCC): ICD-10-CM

## 2020-05-12 DIAGNOSIS — R80.1 PERSISTENT PROTEINURIA: ICD-10-CM

## 2020-05-12 DIAGNOSIS — I50.32 CHRONIC DIASTOLIC (CONGESTIVE) HEART FAILURE (HCC): Primary | ICD-10-CM

## 2020-05-12 RX ORDER — METOLAZONE 5 MG/1
5 TABLET ORAL SEE ADMIN INSTRUCTIONS
Qty: 5 TABLET | Refills: 5 | Status: SHIPPED | OUTPATIENT
Start: 2020-05-12 | End: 2021-09-09 | Stop reason: SDUPTHER

## 2020-05-14 ENCOUNTER — APPOINTMENT (OUTPATIENT)
Dept: LAB | Facility: CLINIC | Age: 65
End: 2020-05-14
Payer: MEDICARE

## 2020-05-14 ENCOUNTER — DOCUMENTATION (OUTPATIENT)
Dept: PULMONOLOGY | Facility: CLINIC | Age: 65
End: 2020-05-14

## 2020-05-14 DIAGNOSIS — N17.9 AKI (ACUTE KIDNEY INJURY) (HCC): ICD-10-CM

## 2020-05-14 DIAGNOSIS — I10 ESSENTIAL HYPERTENSION: ICD-10-CM

## 2020-05-14 DIAGNOSIS — N18.30 STAGE 3 CHRONIC KIDNEY DISEASE (HCC): ICD-10-CM

## 2020-05-14 DIAGNOSIS — R80.1 PERSISTENT PROTEINURIA: ICD-10-CM

## 2020-05-14 LAB
ANION GAP SERPL CALCULATED.3IONS-SCNC: 5 MMOL/L (ref 4–13)
BUN SERPL-MCNC: 48 MG/DL (ref 5–25)
CALCIUM SERPL-MCNC: 9.6 MG/DL (ref 8.3–10.1)
CHLORIDE SERPL-SCNC: 108 MMOL/L (ref 100–108)
CO2 SERPL-SCNC: 25 MMOL/L (ref 21–32)
CREAT SERPL-MCNC: 2.49 MG/DL (ref 0.6–1.3)
CREAT UR-MCNC: 34.9 MG/DL
ERYTHROCYTE [DISTWIDTH] IN BLOOD BY AUTOMATED COUNT: 16.4 % (ref 11.6–15.1)
GFR SERPL CREATININE-BSD FRML MDRD: 26 ML/MIN/1.73SQ M
GLUCOSE P FAST SERPL-MCNC: 94 MG/DL (ref 65–99)
HCT VFR BLD AUTO: 31.6 % (ref 36.5–49.3)
HGB BLD-MCNC: 10.3 G/DL (ref 12–17)
MAGNESIUM SERPL-MCNC: 2.4 MG/DL (ref 1.6–2.6)
MCH RBC QN AUTO: 29.5 PG (ref 26.8–34.3)
MCHC RBC AUTO-ENTMCNC: 32.6 G/DL (ref 31.4–37.4)
MCV RBC AUTO: 91 FL (ref 82–98)
PHOSPHATE SERPL-MCNC: 4.6 MG/DL (ref 2.3–4.1)
PLATELET # BLD AUTO: 265 THOUSANDS/UL (ref 149–390)
PMV BLD AUTO: 9.9 FL (ref 8.9–12.7)
POTASSIUM SERPL-SCNC: 4.5 MMOL/L (ref 3.5–5.3)
PROT UR-MCNC: 258 MG/DL
PROT/CREAT UR: 7.39 MG/G{CREAT} (ref 0–0.1)
PTH-INTACT SERPL-MCNC: 59.1 PG/ML (ref 18.4–80.1)
RBC # BLD AUTO: 3.49 MILLION/UL (ref 3.88–5.62)
SODIUM SERPL-SCNC: 138 MMOL/L (ref 136–145)
WBC # BLD AUTO: 10.29 THOUSAND/UL (ref 4.31–10.16)

## 2020-05-14 PROCEDURE — 82570 ASSAY OF URINE CREATININE: CPT

## 2020-05-14 PROCEDURE — 36415 COLL VENOUS BLD VENIPUNCTURE: CPT

## 2020-05-14 PROCEDURE — 83970 ASSAY OF PARATHORMONE: CPT

## 2020-05-14 PROCEDURE — 84156 ASSAY OF PROTEIN URINE: CPT

## 2020-05-14 PROCEDURE — 83735 ASSAY OF MAGNESIUM: CPT

## 2020-05-14 PROCEDURE — 84100 ASSAY OF PHOSPHORUS: CPT

## 2020-05-14 PROCEDURE — 80048 BASIC METABOLIC PNL TOTAL CA: CPT

## 2020-05-14 PROCEDURE — 85027 COMPLETE CBC AUTOMATED: CPT

## 2020-05-15 ENCOUNTER — TELEPHONE (OUTPATIENT)
Dept: NEPHROLOGY | Facility: CLINIC | Age: 65
End: 2020-05-15

## 2020-05-15 DIAGNOSIS — M86.9 OSTEOMYELITIS OF LEFT FOOT, UNSPECIFIED TYPE (HCC): ICD-10-CM

## 2020-05-15 DIAGNOSIS — I10 ESSENTIAL HYPERTENSION: Primary | ICD-10-CM

## 2020-05-15 DIAGNOSIS — N18.30 STAGE 3 CHRONIC KIDNEY DISEASE (HCC): ICD-10-CM

## 2020-05-15 RX ORDER — SODIUM BICARBONATE 650 MG/1
650 TABLET ORAL
Qty: 60 TABLET | Refills: 0 | Status: SHIPPED | OUTPATIENT
Start: 2020-05-15 | End: 2020-06-06

## 2020-05-19 ENCOUNTER — TELEPHONE (OUTPATIENT)
Dept: PULMONOLOGY | Facility: CLINIC | Age: 65
End: 2020-05-19

## 2020-05-19 ENCOUNTER — APPOINTMENT (OUTPATIENT)
Dept: LAB | Facility: CLINIC | Age: 65
End: 2020-05-19
Payer: MEDICARE

## 2020-05-19 ENCOUNTER — TELEMEDICINE (OUTPATIENT)
Dept: NEPHROLOGY | Facility: CLINIC | Age: 65
End: 2020-05-19
Payer: MEDICARE

## 2020-05-19 DIAGNOSIS — M1A.9XX1 CHRONIC GOUT WITH TOPHUS, UNSPECIFIED CAUSE, UNSPECIFIED SITE: ICD-10-CM

## 2020-05-19 DIAGNOSIS — E87.2 METABOLIC ACIDOSIS: ICD-10-CM

## 2020-05-19 DIAGNOSIS — N18.30 STAGE 3 CHRONIC KIDNEY DISEASE (HCC): ICD-10-CM

## 2020-05-19 DIAGNOSIS — R60.0 BILATERAL LEG EDEMA: ICD-10-CM

## 2020-05-19 DIAGNOSIS — I10 ESSENTIAL HYPERTENSION: ICD-10-CM

## 2020-05-19 DIAGNOSIS — E11.21: Primary | ICD-10-CM

## 2020-05-19 DIAGNOSIS — E87.79 OTHER HYPERVOLEMIA: ICD-10-CM

## 2020-05-19 DIAGNOSIS — R80.1 PERSISTENT PROTEINURIA: ICD-10-CM

## 2020-05-19 DIAGNOSIS — D64.9 ANEMIA: ICD-10-CM

## 2020-05-19 DIAGNOSIS — I50.32 CHRONIC DIASTOLIC (CONGESTIVE) HEART FAILURE (HCC): ICD-10-CM

## 2020-05-19 DIAGNOSIS — I70.1 LEFT RENAL ARTERY STENOSIS (HCC): ICD-10-CM

## 2020-05-19 DIAGNOSIS — D50.9 IRON DEFICIENCY ANEMIA, UNSPECIFIED IRON DEFICIENCY ANEMIA TYPE: ICD-10-CM

## 2020-05-19 DIAGNOSIS — E11.40 TYPE 2 DIABETES MELLITUS WITH DIABETIC NEUROPATHY, WITHOUT LONG-TERM CURRENT USE OF INSULIN (HCC): ICD-10-CM

## 2020-05-19 LAB
ANION GAP SERPL CALCULATED.3IONS-SCNC: 5 MMOL/L (ref 4–13)
BUN SERPL-MCNC: 41 MG/DL (ref 5–25)
CALCIUM SERPL-MCNC: 8.9 MG/DL (ref 8.3–10.1)
CHLORIDE SERPL-SCNC: 108 MMOL/L (ref 100–108)
CO2 SERPL-SCNC: 24 MMOL/L (ref 21–32)
CREAT SERPL-MCNC: 2 MG/DL (ref 0.6–1.3)
GFR SERPL CREATININE-BSD FRML MDRD: 34 ML/MIN/1.73SQ M
GLUCOSE P FAST SERPL-MCNC: 101 MG/DL (ref 65–99)
POTASSIUM SERPL-SCNC: 4.4 MMOL/L (ref 3.5–5.3)
SODIUM SERPL-SCNC: 137 MMOL/L (ref 136–145)

## 2020-05-19 PROCEDURE — 80048 BASIC METABOLIC PNL TOTAL CA: CPT

## 2020-05-19 PROCEDURE — 36415 COLL VENOUS BLD VENIPUNCTURE: CPT

## 2020-05-19 PROCEDURE — 99214 OFFICE O/P EST MOD 30 MIN: CPT | Performed by: INTERNAL MEDICINE

## 2020-05-19 RX ORDER — DOXYCYCLINE HYCLATE 50 MG/1
324 CAPSULE, GELATIN COATED ORAL
Qty: 30 TABLET | Refills: 0 | Status: SHIPPED | OUTPATIENT
Start: 2020-05-19 | End: 2020-06-18 | Stop reason: SDUPTHER

## 2020-05-19 RX ORDER — FUROSEMIDE 40 MG/1
40 TABLET ORAL 2 TIMES DAILY
Qty: 30 TABLET | Refills: 0 | Status: SHIPPED | OUTPATIENT
Start: 2020-05-19 | End: 2020-08-10

## 2020-05-19 RX ORDER — LOSARTAN POTASSIUM 25 MG/1
25 TABLET ORAL DAILY
Qty: 30 TABLET | Refills: 3 | Status: SHIPPED | OUTPATIENT
Start: 2020-05-19 | End: 2020-08-10

## 2020-05-20 ENCOUNTER — TELEMEDICINE (OUTPATIENT)
Dept: PULMONOLOGY | Facility: CLINIC | Age: 65
End: 2020-05-20
Payer: MEDICARE

## 2020-05-20 VITALS
HEIGHT: 70 IN | TEMPERATURE: 97.6 F | BODY MASS INDEX: 27.77 KG/M2 | HEART RATE: 61 BPM | OXYGEN SATURATION: 98 % | WEIGHT: 194 LBS

## 2020-05-20 DIAGNOSIS — G47.33 OSA (OBSTRUCTIVE SLEEP APNEA): Primary | ICD-10-CM

## 2020-05-20 DIAGNOSIS — E11.69 TYPE 2 DIABETES MELLITUS WITH OTHER SPECIFIED COMPLICATION, WITHOUT LONG-TERM CURRENT USE OF INSULIN (HCC): ICD-10-CM

## 2020-05-20 DIAGNOSIS — E11.22 TYPE 2 DIABETES MELLITUS WITH STAGE 3 CHRONIC KIDNEY DISEASE, WITHOUT LONG-TERM CURRENT USE OF INSULIN (HCC): ICD-10-CM

## 2020-05-20 DIAGNOSIS — N18.30 TYPE 2 DIABETES MELLITUS WITH STAGE 3 CHRONIC KIDNEY DISEASE, WITHOUT LONG-TERM CURRENT USE OF INSULIN (HCC): ICD-10-CM

## 2020-05-20 PROCEDURE — 99213 OFFICE O/P EST LOW 20 MIN: CPT | Performed by: INTERNAL MEDICINE

## 2020-05-20 RX ORDER — OMEGA-3-ACID ETHYL ESTERS 1 G/1
4 CAPSULE, LIQUID FILLED ORAL 2 TIMES DAILY
Qty: 360 CAPSULE | Refills: 1 | Status: SHIPPED | OUTPATIENT
Start: 2020-05-20 | End: 2020-11-23 | Stop reason: SDUPTHER

## 2020-05-21 DIAGNOSIS — Z79.4 TYPE 2 DIABETES MELLITUS WITH STAGE 3 CHRONIC KIDNEY DISEASE, WITH LONG-TERM CURRENT USE OF INSULIN (HCC): Primary | ICD-10-CM

## 2020-05-21 DIAGNOSIS — E11.22 TYPE 2 DIABETES MELLITUS WITH STAGE 3 CHRONIC KIDNEY DISEASE, WITH LONG-TERM CURRENT USE OF INSULIN (HCC): Primary | ICD-10-CM

## 2020-05-21 DIAGNOSIS — N18.30 TYPE 2 DIABETES MELLITUS WITH STAGE 3 CHRONIC KIDNEY DISEASE, WITH LONG-TERM CURRENT USE OF INSULIN (HCC): Primary | ICD-10-CM

## 2020-05-29 ENCOUNTER — APPOINTMENT (OUTPATIENT)
Dept: LAB | Facility: CLINIC | Age: 65
End: 2020-05-29
Payer: MEDICARE

## 2020-05-29 DIAGNOSIS — N18.30 STAGE 3 CHRONIC KIDNEY DISEASE (HCC): ICD-10-CM

## 2020-05-29 DIAGNOSIS — R80.1 PERSISTENT PROTEINURIA: ICD-10-CM

## 2020-05-29 DIAGNOSIS — E11.21: ICD-10-CM

## 2020-05-29 DIAGNOSIS — I10 ESSENTIAL HYPERTENSION: ICD-10-CM

## 2020-05-29 DIAGNOSIS — I70.1 LEFT RENAL ARTERY STENOSIS (HCC): ICD-10-CM

## 2020-05-29 DIAGNOSIS — D50.9 IRON DEFICIENCY ANEMIA, UNSPECIFIED IRON DEFICIENCY ANEMIA TYPE: ICD-10-CM

## 2020-05-29 DIAGNOSIS — I50.32 CHRONIC DIASTOLIC (CONGESTIVE) HEART FAILURE (HCC): ICD-10-CM

## 2020-05-29 DIAGNOSIS — E87.79 OTHER HYPERVOLEMIA: ICD-10-CM

## 2020-05-29 DIAGNOSIS — E87.2 METABOLIC ACIDOSIS: ICD-10-CM

## 2020-05-29 DIAGNOSIS — R60.0 BILATERAL LEG EDEMA: ICD-10-CM

## 2020-05-29 DIAGNOSIS — E11.40 TYPE 2 DIABETES MELLITUS WITH DIABETIC NEUROPATHY, WITHOUT LONG-TERM CURRENT USE OF INSULIN (HCC): ICD-10-CM

## 2020-05-29 DIAGNOSIS — M1A.9XX1 CHRONIC GOUT WITH TOPHUS, UNSPECIFIED CAUSE, UNSPECIFIED SITE: ICD-10-CM

## 2020-05-29 DIAGNOSIS — D64.9 ANEMIA: ICD-10-CM

## 2020-05-29 LAB
ALBUMIN SERPL BCP-MCNC: 2.9 G/DL (ref 3.5–5)
ALP SERPL-CCNC: 92 U/L (ref 46–116)
ALT SERPL W P-5'-P-CCNC: 27 U/L (ref 12–78)
ANION GAP SERPL CALCULATED.3IONS-SCNC: 7 MMOL/L (ref 4–13)
AST SERPL W P-5'-P-CCNC: 22 U/L (ref 5–45)
BACTERIA UR QL AUTO: ABNORMAL /HPF
BASOPHILS # BLD AUTO: 0.09 THOUSANDS/ΜL (ref 0–0.1)
BASOPHILS NFR BLD AUTO: 1 % (ref 0–1)
BILIRUB SERPL-MCNC: 0.56 MG/DL (ref 0.2–1)
BILIRUB UR QL STRIP: NEGATIVE
BUN SERPL-MCNC: 49 MG/DL (ref 5–25)
CALCIUM SERPL-MCNC: 9.5 MG/DL (ref 8.3–10.1)
CHLORIDE SERPL-SCNC: 108 MMOL/L (ref 100–108)
CLARITY UR: CLEAR
CO2 SERPL-SCNC: 23 MMOL/L (ref 21–32)
COLOR UR: YELLOW
CREAT SERPL-MCNC: 2.3 MG/DL (ref 0.6–1.3)
CREAT UR-MCNC: 66.9 MG/DL
EOSINOPHIL # BLD AUTO: 0.73 THOUSAND/ΜL (ref 0–0.61)
EOSINOPHIL NFR BLD AUTO: 7 % (ref 0–6)
ERYTHROCYTE [DISTWIDTH] IN BLOOD BY AUTOMATED COUNT: 15.5 % (ref 11.6–15.1)
FERRITIN SERPL-MCNC: 59 NG/ML (ref 8–388)
GFR SERPL CREATININE-BSD FRML MDRD: 29 ML/MIN/1.73SQ M
GLUCOSE P FAST SERPL-MCNC: 82 MG/DL (ref 65–99)
GLUCOSE UR STRIP-MCNC: ABNORMAL MG/DL
HCT VFR BLD AUTO: 29.5 % (ref 36.5–49.3)
HGB BLD-MCNC: 9.7 G/DL (ref 12–17)
HGB UR QL STRIP.AUTO: ABNORMAL
HYALINE CASTS #/AREA URNS LPF: ABNORMAL /LPF
IMM GRANULOCYTES # BLD AUTO: 0.04 THOUSAND/UL (ref 0–0.2)
IMM GRANULOCYTES NFR BLD AUTO: 0 % (ref 0–2)
KETONES UR STRIP-MCNC: NEGATIVE MG/DL
LEUKOCYTE ESTERASE UR QL STRIP: NEGATIVE
LYMPHOCYTES # BLD AUTO: 1.4 THOUSANDS/ΜL (ref 0.6–4.47)
LYMPHOCYTES NFR BLD AUTO: 14 % (ref 14–44)
MCH RBC QN AUTO: 29.8 PG (ref 26.8–34.3)
MCHC RBC AUTO-ENTMCNC: 32.9 G/DL (ref 31.4–37.4)
MCV RBC AUTO: 91 FL (ref 82–98)
MONOCYTES # BLD AUTO: 0.89 THOUSAND/ΜL (ref 0.17–1.22)
MONOCYTES NFR BLD AUTO: 9 % (ref 4–12)
NEUTROPHILS # BLD AUTO: 6.9 THOUSANDS/ΜL (ref 1.85–7.62)
NEUTS SEG NFR BLD AUTO: 69 % (ref 43–75)
NITRITE UR QL STRIP: NEGATIVE
NON-SQ EPI CELLS URNS QL MICRO: ABNORMAL /HPF
NRBC BLD AUTO-RTO: 0 /100 WBCS
PH UR STRIP.AUTO: 7 [PH]
PLATELET # BLD AUTO: 269 THOUSANDS/UL (ref 149–390)
PMV BLD AUTO: 9.6 FL (ref 8.9–12.7)
POTASSIUM SERPL-SCNC: 4.3 MMOL/L (ref 3.5–5.3)
PROT SERPL-MCNC: 6.8 G/DL (ref 6.4–8.2)
PROT UR STRIP-MCNC: ABNORMAL MG/DL
PROT UR-MCNC: 515 MG/DL
PROT/CREAT UR: 7.7 MG/G{CREAT} (ref 0–0.1)
RBC # BLD AUTO: 3.26 MILLION/UL (ref 3.88–5.62)
RBC #/AREA URNS AUTO: ABNORMAL /HPF
SODIUM SERPL-SCNC: 138 MMOL/L (ref 136–145)
SP GR UR STRIP.AUTO: 1.02 (ref 1–1.03)
UROBILINOGEN UR QL STRIP.AUTO: 0.2 E.U./DL
WBC # BLD AUTO: 10.05 THOUSAND/UL (ref 4.31–10.16)
WBC #/AREA URNS AUTO: ABNORMAL /HPF

## 2020-05-29 PROCEDURE — 85025 COMPLETE CBC W/AUTO DIFF WBC: CPT

## 2020-05-29 PROCEDURE — 36415 COLL VENOUS BLD VENIPUNCTURE: CPT

## 2020-05-29 PROCEDURE — 84156 ASSAY OF PROTEIN URINE: CPT | Performed by: INTERNAL MEDICINE

## 2020-05-29 PROCEDURE — 81001 URINALYSIS AUTO W/SCOPE: CPT | Performed by: INTERNAL MEDICINE

## 2020-05-29 PROCEDURE — 80053 COMPREHEN METABOLIC PANEL: CPT

## 2020-05-29 PROCEDURE — 82728 ASSAY OF FERRITIN: CPT

## 2020-05-29 PROCEDURE — 82570 ASSAY OF URINE CREATININE: CPT | Performed by: INTERNAL MEDICINE

## 2020-05-29 RX ORDER — FAMOTIDINE 40 MG/1
TABLET, FILM COATED ORAL
COMMUNITY
Start: 2020-05-27 | End: 2020-06-19 | Stop reason: SDUPTHER

## 2020-06-01 ENCOUNTER — OFFICE VISIT (OUTPATIENT)
Dept: FAMILY MEDICINE CLINIC | Facility: CLINIC | Age: 65
End: 2020-06-01
Payer: MEDICARE

## 2020-06-01 ENCOUNTER — TELEPHONE (OUTPATIENT)
Dept: NEPHROLOGY | Facility: CLINIC | Age: 65
End: 2020-06-01

## 2020-06-01 VITALS
WEIGHT: 200 LBS | SYSTOLIC BLOOD PRESSURE: 142 MMHG | HEART RATE: 64 BPM | RESPIRATION RATE: 16 BRPM | DIASTOLIC BLOOD PRESSURE: 60 MMHG | TEMPERATURE: 97.5 F | HEIGHT: 70 IN | BODY MASS INDEX: 28.63 KG/M2

## 2020-06-01 DIAGNOSIS — Z79.4 TYPE 2 DIABETES MELLITUS WITH STAGE 3 CHRONIC KIDNEY DISEASE, WITH LONG-TERM CURRENT USE OF INSULIN (HCC): ICD-10-CM

## 2020-06-01 DIAGNOSIS — N18.30 STAGE 3 CHRONIC KIDNEY DISEASE (HCC): ICD-10-CM

## 2020-06-01 DIAGNOSIS — E11.22 TYPE 2 DIABETES MELLITUS WITH STAGE 3 CHRONIC KIDNEY DISEASE, WITH LONG-TERM CURRENT USE OF INSULIN (HCC): ICD-10-CM

## 2020-06-01 DIAGNOSIS — Z00.00 MEDICARE ANNUAL WELLNESS VISIT, INITIAL: ICD-10-CM

## 2020-06-01 DIAGNOSIS — Z12.5 SCREENING PSA (PROSTATE SPECIFIC ANTIGEN): ICD-10-CM

## 2020-06-01 DIAGNOSIS — N18.30 TYPE 2 DIABETES MELLITUS WITH STAGE 3 CHRONIC KIDNEY DISEASE, WITH LONG-TERM CURRENT USE OF INSULIN (HCC): ICD-10-CM

## 2020-06-01 DIAGNOSIS — I10 ESSENTIAL HYPERTENSION: ICD-10-CM

## 2020-06-01 DIAGNOSIS — G47.33 OSA (OBSTRUCTIVE SLEEP APNEA): ICD-10-CM

## 2020-06-01 DIAGNOSIS — E78.2 MIXED HYPERLIPIDEMIA: ICD-10-CM

## 2020-06-01 DIAGNOSIS — E11.40 TYPE 2 DIABETES MELLITUS WITH DIABETIC NEUROPATHY, WITHOUT LONG-TERM CURRENT USE OF INSULIN (HCC): Primary | ICD-10-CM

## 2020-06-01 LAB — PSA SERPL-MCNC: 0.4 NG/ML (ref 0–4)

## 2020-06-01 PROCEDURE — 3008F BODY MASS INDEX DOCD: CPT | Performed by: FAMILY MEDICINE

## 2020-06-01 PROCEDURE — 3078F DIAST BP <80 MM HG: CPT | Performed by: FAMILY MEDICINE

## 2020-06-01 PROCEDURE — 2022F DILAT RTA XM EVC RTNOPTHY: CPT | Performed by: FAMILY MEDICINE

## 2020-06-01 PROCEDURE — 3077F SYST BP >= 140 MM HG: CPT | Performed by: FAMILY MEDICINE

## 2020-06-01 PROCEDURE — 99214 OFFICE O/P EST MOD 30 MIN: CPT | Performed by: FAMILY MEDICINE

## 2020-06-01 PROCEDURE — 3044F HG A1C LEVEL LT 7.0%: CPT | Performed by: FAMILY MEDICINE

## 2020-06-01 PROCEDURE — 1036F TOBACCO NON-USER: CPT | Performed by: FAMILY MEDICINE

## 2020-06-01 PROCEDURE — 1125F AMNT PAIN NOTED PAIN PRSNT: CPT | Performed by: FAMILY MEDICINE

## 2020-06-01 PROCEDURE — G0438 PPPS, INITIAL VISIT: HCPCS | Performed by: FAMILY MEDICINE

## 2020-06-01 PROCEDURE — G0103 PSA SCREENING: HCPCS | Performed by: FAMILY MEDICINE

## 2020-06-01 PROCEDURE — 3066F NEPHROPATHY DOC TX: CPT | Performed by: FAMILY MEDICINE

## 2020-06-01 PROCEDURE — 36415 COLL VENOUS BLD VENIPUNCTURE: CPT | Performed by: FAMILY MEDICINE

## 2020-06-01 PROCEDURE — 1170F FXNL STATUS ASSESSED: CPT | Performed by: FAMILY MEDICINE

## 2020-06-01 PROCEDURE — 1160F RVW MEDS BY RX/DR IN RCRD: CPT | Performed by: FAMILY MEDICINE

## 2020-06-01 PROCEDURE — 4040F PNEUMOC VAC/ADMIN/RCVD: CPT | Performed by: FAMILY MEDICINE

## 2020-06-05 ENCOUNTER — TELEMEDICINE (OUTPATIENT)
Dept: ENDOCRINOLOGY | Facility: CLINIC | Age: 65
End: 2020-06-05
Payer: MEDICARE

## 2020-06-05 DIAGNOSIS — Z79.4 TYPE 2 DIABETES MELLITUS WITH STAGE 3 CHRONIC KIDNEY DISEASE, WITH LONG-TERM CURRENT USE OF INSULIN (HCC): Primary | ICD-10-CM

## 2020-06-05 DIAGNOSIS — E11.22 TYPE 2 DIABETES MELLITUS WITH STAGE 3 CHRONIC KIDNEY DISEASE, WITH LONG-TERM CURRENT USE OF INSULIN (HCC): Primary | ICD-10-CM

## 2020-06-05 DIAGNOSIS — E78.2 MIXED HYPERLIPIDEMIA: ICD-10-CM

## 2020-06-05 DIAGNOSIS — N18.30 TYPE 2 DIABETES MELLITUS WITH STAGE 3 CHRONIC KIDNEY DISEASE, WITH LONG-TERM CURRENT USE OF INSULIN (HCC): Primary | ICD-10-CM

## 2020-06-05 DIAGNOSIS — I10 ESSENTIAL HYPERTENSION: ICD-10-CM

## 2020-06-05 PROCEDURE — 99214 OFFICE O/P EST MOD 30 MIN: CPT | Performed by: NURSE PRACTITIONER

## 2020-06-06 DIAGNOSIS — M86.9 OSTEOMYELITIS OF LEFT FOOT, UNSPECIFIED TYPE (HCC): ICD-10-CM

## 2020-06-06 RX ORDER — SODIUM BICARBONATE 650 MG/1
650 TABLET ORAL
Qty: 60 TABLET | Refills: 0 | Status: SHIPPED | OUTPATIENT
Start: 2020-06-06 | End: 2020-06-13

## 2020-06-13 DIAGNOSIS — M86.9 OSTEOMYELITIS OF LEFT FOOT, UNSPECIFIED TYPE (HCC): ICD-10-CM

## 2020-06-13 RX ORDER — SODIUM BICARBONATE 650 MG/1
650 TABLET ORAL
Qty: 60 TABLET | Refills: 0 | Status: SHIPPED | OUTPATIENT
Start: 2020-06-13 | End: 2020-07-07

## 2020-06-16 ENCOUNTER — REMOTE DEVICE CLINIC VISIT (OUTPATIENT)
Dept: CARDIOLOGY CLINIC | Facility: CLINIC | Age: 65
End: 2020-06-16
Payer: MEDICARE

## 2020-06-16 DIAGNOSIS — Z95.0 PACEMAKER: Primary | ICD-10-CM

## 2020-06-16 PROCEDURE — 93296 REM INTERROG EVL PM/IDS: CPT | Performed by: INTERNAL MEDICINE

## 2020-06-16 PROCEDURE — 93294 REM INTERROG EVL PM/LDLS PM: CPT | Performed by: INTERNAL MEDICINE

## 2020-06-18 DIAGNOSIS — M86.9 OSTEOMYELITIS OF LEFT FOOT, UNSPECIFIED TYPE (HCC): ICD-10-CM

## 2020-06-18 DIAGNOSIS — D64.9 ANEMIA: ICD-10-CM

## 2020-06-18 RX ORDER — SODIUM BICARBONATE 650 MG/1
650 TABLET ORAL
Qty: 60 TABLET | Refills: 0 | OUTPATIENT
Start: 2020-06-18

## 2020-06-18 RX ORDER — DOXYCYCLINE HYCLATE 50 MG/1
324 CAPSULE, GELATIN COATED ORAL
Qty: 30 TABLET | Refills: 0 | Status: SHIPPED | OUTPATIENT
Start: 2020-06-18 | End: 2020-07-26

## 2020-06-18 RX ORDER — DOXYCYCLINE HYCLATE 50 MG/1
324 CAPSULE, GELATIN COATED ORAL
Qty: 30 TABLET | Refills: 0 | Status: CANCELLED | OUTPATIENT
Start: 2020-06-18

## 2020-06-19 DIAGNOSIS — D50.9 IRON DEFICIENCY ANEMIA, UNSPECIFIED IRON DEFICIENCY ANEMIA TYPE: Primary | ICD-10-CM

## 2020-06-19 DIAGNOSIS — D50.9 IRON DEFICIENCY ANEMIA, UNSPECIFIED IRON DEFICIENCY ANEMIA TYPE: ICD-10-CM

## 2020-06-19 RX ORDER — FAMOTIDINE 40 MG/1
40 TABLET, FILM COATED ORAL DAILY
Qty: 90 TABLET | Refills: 3 | Status: SHIPPED | OUTPATIENT
Start: 2020-06-19 | End: 2021-08-04

## 2020-06-19 RX ORDER — DOXYCYCLINE HYCLATE 50 MG/1
CAPSULE, GELATIN COATED ORAL
Qty: 30 TABLET | Refills: 0 | Status: SHIPPED | OUTPATIENT
Start: 2020-06-19 | End: 2020-08-10 | Stop reason: ALTCHOICE

## 2020-07-06 ENCOUNTER — TELEPHONE (OUTPATIENT)
Dept: FAMILY MEDICINE CLINIC | Facility: CLINIC | Age: 65
End: 2020-07-06

## 2020-07-06 DIAGNOSIS — R33.9 URINARY RETENTION: Primary | ICD-10-CM

## 2020-07-06 RX ORDER — TAMSULOSIN HYDROCHLORIDE 0.4 MG/1
0.4 CAPSULE ORAL
Qty: 90 CAPSULE | Refills: 3 | Status: SHIPPED | OUTPATIENT
Start: 2020-07-06 | End: 2021-06-13

## 2020-07-06 NOTE — TELEPHONE ENCOUNTER
----- Message from Amelia Perez sent at 2020  1:39 PM EDT -----  Regarding: Non-Urgent Medical Question  Contact: [phone]  Dr Travis Yusuf     1955 needs a 90 day refill on   tamsulosin 0 4 mg  take 0 4 mg by mouth daily with dinner  When I tried to fill it under "refill a mediation" I got the following message- "this medication cannot currently be refilled from this area of [facility]  Please send a non-urgent medical message requesting a refill of the medication"  S Derrick  originally ordered this medication  Not sure who she is   Are  you able to refill this for Darwin Geiger?  thanks, Roman Mention

## 2020-07-07 DIAGNOSIS — M86.9 OSTEOMYELITIS OF LEFT FOOT, UNSPECIFIED TYPE (HCC): ICD-10-CM

## 2020-07-07 RX ORDER — SODIUM BICARBONATE 650 MG/1
650 TABLET ORAL
Qty: 60 TABLET | Refills: 2 | Status: SHIPPED | OUTPATIENT
Start: 2020-07-07 | End: 2020-09-30

## 2020-07-20 ENCOUNTER — OFFICE VISIT (OUTPATIENT)
Dept: CARDIOLOGY CLINIC | Facility: CLINIC | Age: 65
End: 2020-07-20
Payer: MEDICARE

## 2020-07-20 VITALS
SYSTOLIC BLOOD PRESSURE: 150 MMHG | HEART RATE: 80 BPM | TEMPERATURE: 98.6 F | DIASTOLIC BLOOD PRESSURE: 68 MMHG | WEIGHT: 204.6 LBS | BODY MASS INDEX: 29.36 KG/M2

## 2020-07-20 DIAGNOSIS — I48.0 PAF (PAROXYSMAL ATRIAL FIBRILLATION) (HCC): ICD-10-CM

## 2020-07-20 DIAGNOSIS — I44.2 COMPLETE HEART BLOCK (HCC): ICD-10-CM

## 2020-07-20 DIAGNOSIS — I50.32 CHRONIC DIASTOLIC (CONGESTIVE) HEART FAILURE (HCC): Primary | ICD-10-CM

## 2020-07-20 DIAGNOSIS — I34.2 NONRHEUMATIC MITRAL VALVE STENOSIS: ICD-10-CM

## 2020-07-20 DIAGNOSIS — E78.49 OTHER HYPERLIPIDEMIA: ICD-10-CM

## 2020-07-20 DIAGNOSIS — I10 ESSENTIAL HYPERTENSION: ICD-10-CM

## 2020-07-20 DIAGNOSIS — I35.9 AORTIC VALVE DISEASE: ICD-10-CM

## 2020-07-20 PROCEDURE — 4040F PNEUMOC VAC/ADMIN/RCVD: CPT | Performed by: INTERNAL MEDICINE

## 2020-07-20 PROCEDURE — 3078F DIAST BP <80 MM HG: CPT | Performed by: INTERNAL MEDICINE

## 2020-07-20 PROCEDURE — 1036F TOBACCO NON-USER: CPT | Performed by: INTERNAL MEDICINE

## 2020-07-20 PROCEDURE — 3066F NEPHROPATHY DOC TX: CPT | Performed by: INTERNAL MEDICINE

## 2020-07-20 PROCEDURE — 3077F SYST BP >= 140 MM HG: CPT | Performed by: INTERNAL MEDICINE

## 2020-07-20 PROCEDURE — 1160F RVW MEDS BY RX/DR IN RCRD: CPT | Performed by: INTERNAL MEDICINE

## 2020-07-20 PROCEDURE — 2022F DILAT RTA XM EVC RTNOPTHY: CPT | Performed by: INTERNAL MEDICINE

## 2020-07-20 PROCEDURE — 99214 OFFICE O/P EST MOD 30 MIN: CPT | Performed by: INTERNAL MEDICINE

## 2020-07-20 PROCEDURE — 3044F HG A1C LEVEL LT 7.0%: CPT | Performed by: INTERNAL MEDICINE

## 2020-07-20 NOTE — PROGRESS NOTES
Cardiology Follow Up    [facility]  1955  [id number]  [address]  [address]  [place] [address] [address] [address] [phone]    Reason for visit:  3 month follow-up for chronic diastolic heart failure, moderate mitral stenosis, mild aortic stenosis with moderate AI, complete heart block status post permanent pacemaker, paroxysmal atrial fibrillation, hypertension with chronic kidney disease, hyperlipidemia  Also has diabetes mellitus    1  Chronic diastolic (congestive) heart failure ([facility] )     2  Essential hypertension     3  PAF (paroxysmal atrial fibrillation) ([facility] )     4  Nonrheumatic mitral valve stenosis     5  Complete heart block ([facility]     6  Nonrheumatic aortic valve stenosis     7  Other hyperlipidemia         Interval History: Since his last visit he denies SOB  Chloé David He does have some LLE edema  He denies palpitations or dizziness  He denies chest pain   His wt has been relative stable    Patient Active Problem List   Diagnosis    Bilateral leg edema    Diabetes mellitus with neuropathy ([facility] )    [facility] foot ulcer [facility]    Easy bruising    Eczema    Erectile dysfunction of non-organic origin    Gout    Hyperlipidemia    Uremia    Osteoarthritis    Peripheral arterial disease (HCC)    PVCs (premature ventricular contractions)    Rhinitis    Systolic murmur    Vertigo    Complete heart block (HCC)    Metabolic acidosis    Other hyperlipidemia    Severe sepsis (HCC)    PAF (paroxysmal atrial fibrillation) (HCC)    Acute respiratory failure with hypoxia (HCC)    Persistent proteinuria    Volume overload    Urinary retention    NICOLASA (obstructive sleep apnea)    Acute diastolic (congestive) heart failure (HCC)    Type 2 diabetes mellitus with chronic kidney disease, with long-term current use of insulin (HCC)    Essential hypertension    Stage 3 chronic kidney disease (HCC)    Nonrheumatic aortic valve stenosis    Hypoglycemia    Anemia    Fever    Mitral valve stenosis    Abnormal CT of the chest    Acute pulmonary edema (HCC)    Chronic diastolic (congestive) heart failure (HCC)    Left renal artery stenosis ([facility])    S/P amputation of lesser toe, left (HCC)    S/P amputation of lesser toe, right (HCC)    Elevated troponin I level    Staphylococcus aureus bacteremia    Type 2 diabetes mellitus with diabetic neuropathy, without long-term current use of insulin ([facility])    Iron deficiency anemia    Anxiety    Osteomyelitis of left foot ([facility])    Amputation of left great toe ([facility])    Multiple drug resistant organism (MDRO) culture positive    [facility]abetic kidney disease ([facility]    Diabetes mellitus ([facility] )    Hypertension    Acute renal failure (ARF) ([facility] )     Past Medical History:   Diagnosis Date    Arthritis     OA    Bruise of both arms     forearms and both hands    Bruises easily     [facility]     Diabetes mellitus ([facility]     [facility] foot ulcer [facility]     Eczema     Erectile dysfunction     Gout     Hyperlipidemia     Hypertension     Murmur     Nephropathy     Osteoarthritis     PAD (peripheral artery disease) ([facility])     Seasonal allergies     Toe infection     bilat great toes    Vertigo     Walks frequently     Wears dentures     upper    Wears glasses      Social History     Socioeconomic History    Marital status: /Civil Union     Spouse name: Not on file    Number of children: Not on file    Years of education: Not on file    Highest education level: Not on file   Occupational History    Not on file   Social Needs    Financial resource strain: Not on file    Food insecurity:     Worry: Not on file     Inability: Not on file    Transportation needs:     Medical: Not on file     Non-medical: Not on file   Tobacco Use    Smoking status: Former Smoker     Packs/day: 1 00     Years: 30 00     Pack years: 30 00     Types: Cigarettes Last attempt to quit: 2009     Years since quittin 5    Smokeless tobacco: Never Used    Tobacco comment: quit 10 years ago   Substance and Sexual Activity    Alcohol use: Never     Frequency: Never     Drinks per session: Patient refused     Binge frequency: Never    Drug use: Never    Sexual activity: Not on file   Lifestyle    Physical activity:     Days per week: Not on file     Minutes per session: Not on file    Stress: Not on file   Relationships    Social connections:     Talks on phone: Not on file     Gets together: Not on file     Attends [employer] service: Not on file     Active member of club or organization: Not on file     Attends meetings of clubs or organizations: Not on file     Relationship status: Not on file    Intimate partner violence:     Fear of current or ex partner: Not on file     Emotionally abused: Not on file     Physically abused: Not on file     Forced sexual activity: Not on file   Other Topics Concern    Not on file   Social History Narrative    ** Merged History Encounter **         Daily cola consumption (2 cans/day)      Family History   Problem Relation Age of Onset    Heart disease Father     No Known Problems Mother     Hypertension Father     Pulmonary embolism Father      Past Surgical History:   Procedure Laterality Date    CARDIAC PACEMAKER PLACEMENT      CARPAL TUNNEL RELEASE Left     CARPAL TUNNEL RELEASE Right     CARPAL TUNNEL RELEASE      FOOT AMPUTATION Left 2/10/2020    Procedure: PARTIAL 1ST RAY AMPUTATION;  Surgeon: Fermín Mullen DPM;  [facility];  Service: Podiatry    INCISION AND DRAINAGE OF WOUND Left 2020    Procedure: INCISION AND DRAINAGE (I&D) EXTREMITY AND REMOVAL OF SESMOID BONE;  Surgeon: Godwin Lomas DPM;  [facility];  Service: Podiatry    IR PICC REPO  2020    KNEE ARTHROSCOPY Left     KNEE ARTHROSCOPY Right     KNEE SURGERY      IN AMPUTATION TOE,I-P JT Bilateral 2017    Procedure: PARTIAL AMPUTATION RIGHT AND LEFT HALLUX ;  Surgeon: Yovanny Parada DPM;  [facility];  Service: Podiatry    AR AMPUTATION TOE,MT-P JT Left 7/25/2017    Procedure: 2ND TOE AMPUTATION;  Surgeon: Yovanny Parada DPM;  [facility];  Service: Podiatry    SHOULDER ARTHROSCOPY Left     with screws,RTC    SHOULDER SURGERY      TOE AMPUTATION Left 1/8/2020    Procedure: HALLUX AMPUTATION;  Surgeon: Ankush Armstrong DPM;  [facility];  Service: Podiatry    VASECTOMY         Current Outpatient Medications:     allopurinol (ZYLOPRIM) 300 mg tablet, Take 1 tablet (300 mg total) by mouth every morning, Disp: 90 tablet, Rfl: 2    amLODIPine (NORVASC) 10 mg tablet, TAKE 1 TABLET BY MOUTH EVERY DAY, Disp: 90 tablet, Rfl: 3    apixaban (ELIQUIS) 5 mg, Take 1 tablet (5 mg total) by mouth every 12 (twelve) hours, Disp: 60 tablet, Rfl: 11    betamethasone valerate (VALISONE) 0 1 % cream, as needed , Disp: , Rfl: 3    Cholecalciferol (VITAMIN D-3) 1000 units CAPS, Take 1 capsule by mouth every morning  , Disp: , Rfl:     famotidine (PEPCID) 40 MG tablet, Take 1 tablet (40 mg total) by mouth daily, Disp: 90 tablet, Rfl: 3    ferrous gluconate (FERGON) 324 mg tablet, TAKE 1 TABLET BY MOUTH DAILY WITH BREAKFAST, Disp: 30 tablet, Rfl: 0    ferrous gluconate (FERGON) 324 mg tablet, Take 1 tablet (324 mg total) by mouth daily with breakfast, Disp: 30 tablet, Rfl: 0    furosemide (LASIX) 40 mg tablet, Take 1 tablet (40 mg total) by mouth 2 (two) times a day, Disp: 30 tablet, Rfl: 0    glucose blood (ONE TOUCH ULTRA TEST) test strip, 1 each by Other route 3 (three) times a day Use to test blood sugar, Disp: 130 each, Rfl: 6    Insulin Degludec-Liraglutide (Insulin Degludec-Liraglutide) 100 units-3 6 mg/mL injection pen, Inject 19 Units under the skin daily, Disp: , Rfl:     Insulin Pen Needle (BD Pen Needle Zenaida U/F) 32G X 4 MM MISC, Use 1 daily, Disp: 100 each, Rfl: 3    losartan (COZAAR) 25 mg tablet, Take 1 tablet (25 mg total) by mouth daily, Disp: 30 tablet, Rfl: 3    metFORMIN (GLUCOPHAGE) 500 mg tablet, Take 1 tablet (500 mg total) by mouth 2 (two) times a day with meals, Disp: 180 tablet, Rfl: 3    metolazone (ZAROXOLYN) 5 mg tablet, Take 1 tablet (5 mg total) by mouth see administration instructions Take every 6 days prn as directed by physician for wt gain/edema, Disp: 5 tablet, Rfl: 5    metoprolol tartrate (LOPRESSOR) 50 mg tablet, Take 0 5 tablets (25 mg total) by mouth every 12 (twelve) hours, Disp: 60 tablet, Rfl: 0    Multiple Vitamin (MULTIVITAMIN) tablet, Take 1 tablet by mouth daily IN AM, Disp: , Rfl:     omega-3-acid ethyl esters (LOVAZA) 1 g capsule, Take 4 capsules (4 g total) by mouth 2 (two) times a day, Disp: 360 capsule, Rfl: 1    simvastatin (ZOCOR) 20 mg tablet, Take 1 tablet (20 mg total) by mouth daily at bedtime, Disp: 90 tablet, Rfl: 3    sodium bicarbonate 650 mg tablet, TAKE 1 TABLET (650 MG TOTAL) BY MOUTH 2 (TWO) TIMES DAILY AFTER MEALS, Disp: 60 tablet, Rfl: 2    tamsulosin (FLOMAX) 0 4 mg, Take 1 capsule (0 4 mg total) by mouth daily with dinner, Disp: 90 capsule, Rfl: 3  Allergies   Allergen Reactions    Invokana [Canagliflozin]     Lamisil [Terbinafine] Rash    Lamisil [Terbinafine] Blisters     Wife states " His skin peeled from head to toe"    Other Swelling     Pomegranate - facial swelling, no swelling of tongue, esophagus  Adhesive tape   Latex Rash       Review of Systems:  Review of Systems   Constitutional: Positive for fatigue  Negative for activity change, appetite change and unexpected weight change  Respiratory: Negative for cough, chest tightness, shortness of breath and wheezing  Cardiovascular: Positive for leg swelling (LLE)  Negative for chest pain and palpitations  Gastrointestinal: Negative for abdominal pain, blood in stool, constipation and diarrhea  Genitourinary: Positive for frequency  Negative for dysuria, hematuria and urgency  Musculoskeletal: Positive for arthralgias and back pain  Negative for gait problem and joint swelling  Neurological: Positive for numbness (hands)  Negative for dizziness, syncope, speech difficulty, light-headedness and headaches  Psychiatric/Behavioral: Negative for agitation, behavioral problems, confusion and decreased concentration  Physical Exam:  Vitals:    07/20/20 0940   BP: 150/68   BP Location: Right arm   Patient Position: Sitting   Cuff Size: Adult   Pulse: 80   Temp: 98 6 °F (37 °C)   Weight: 92 8 kg (204 lb 9 6 oz)       Physical Exam   Constitutional: He is oriented to person, place, and time  He appears well-developed and well-nourished  No distress  HENT:   Head: Normocephalic and atraumatic  Mouth/Throat: Oropharynx is clear and moist  No oropharyngeal exudate  Eyes: No scleral icterus  Conjunctiva pale   Neck: Neck supple  Normal carotid pulses and no JVD present  Carotid bruit is present (Transmitted murmur)  No thyromegaly present  Cardiovascular: Normal rate and regular rhythm  Exam reveals no gallop and no friction rub  Murmur heard  Crescendo decrescendo systolic ( basal) murmur is present with a grade of 3/6  No diastolic murmur is present  Pulses:       Posterior tibial pulses are 2+ on the right side, and 2+ on the left side  Pulmonary/Chest: He has decreased breath sounds  He has no wheezes  He has no rhonchi  He has no rales  Abdominal: Soft  He exhibits no mass  There is no hepatosplenomegaly  There is no tenderness  Musculoskeletal: He exhibits edema ( 1+ post ankle edema greater on the left) and deformity ( kyphosis)  He exhibits no tenderness  Neurological: He is alert and oriented to person, place, and time  He has normal strength  No cranial nerve deficit or sensory deficit  Skin: Skin is warm and dry  No rash noted  No erythema  No pallor  Psychiatric: He has a normal mood and affect   His behavior is normal  Judgment and thought content normal        Discussion/Summary:  1  Chronic diastolic heart failure with large noncardiogenic component due to renal failure  Patient on furosemide 40 mg b i d  Feel he is compensated at this time  No need for metolazone at this time but he does have this and can take up to every 5-6 days as needed  2  Hypertension  Blood pressure a bit elevated today on amlodipine 10 mg daily, metoprolol 25 mg b i d  And losartan 25 mg daily  He tells me blood pressure readings at home have been fairly good however  No adjustments for that reason  3  Paroxysmal atrial fibrillation  Seen at the time of acute illness some time back  He did have some atrial fibrillation on his interrogations  Considering high chads Vasc 2 score will continue Eliquis for stroke prophylaxis  4  Mitral stenosis  Moderate  Does not need intervention at this time  5  Complete heart block status post permanent pacemaker in November of 2018  Normally functioning device  6  Mixed aortic valve disease  No severe lesions by echo earlier this year  Will continue to watch going forward  7  Hyperlipidemia  Patient on simvastatin 20 mg daily  Recent LDL cholesterol 73 with HDL cholesterol 37 and triglycerides 108   Continue same    Follow-up 4 months    Elkin Norman MD

## 2020-07-26 DIAGNOSIS — D64.9 ANEMIA: ICD-10-CM

## 2020-07-26 RX ORDER — DOXYCYCLINE HYCLATE 50 MG/1
CAPSULE, GELATIN COATED ORAL
Qty: 30 TABLET | Refills: 0 | Status: SHIPPED | OUTPATIENT
Start: 2020-07-26 | End: 2020-11-24

## 2020-07-27 DIAGNOSIS — E11.40 TYPE 2 DIABETES MELLITUS WITH DIABETIC NEUROPATHY, WITHOUT LONG-TERM CURRENT USE OF INSULIN (HCC): Primary | ICD-10-CM

## 2020-07-29 DIAGNOSIS — E11.40 TYPE 2 DIABETES MELLITUS WITH DIABETIC NEUROPATHY, WITHOUT LONG-TERM CURRENT USE OF INSULIN (HCC): ICD-10-CM

## 2020-08-04 ENCOUNTER — TELEPHONE (OUTPATIENT)
Dept: NEPHROLOGY | Facility: CLINIC | Age: 65
End: 2020-08-04

## 2020-08-04 ENCOUNTER — APPOINTMENT (OUTPATIENT)
Dept: LAB | Facility: CLINIC | Age: 65
End: 2020-08-04
Payer: MEDICARE

## 2020-08-04 ENCOUNTER — TRANSCRIBE ORDERS (OUTPATIENT)
Dept: URGENT CARE | Facility: CLINIC | Age: 65
End: 2020-08-04

## 2020-08-04 DIAGNOSIS — D64.9 ANEMIA: ICD-10-CM

## 2020-08-04 DIAGNOSIS — I50.32 CHRONIC DIASTOLIC (CONGESTIVE) HEART FAILURE (HCC): ICD-10-CM

## 2020-08-04 DIAGNOSIS — E11.21: ICD-10-CM

## 2020-08-04 DIAGNOSIS — I70.1 LEFT RENAL ARTERY STENOSIS (HCC): ICD-10-CM

## 2020-08-04 DIAGNOSIS — N18.30 STAGE 3 CHRONIC KIDNEY DISEASE (HCC): ICD-10-CM

## 2020-08-04 DIAGNOSIS — E11.22 TYPE 2 DIABETES MELLITUS WITH STAGE 3 CHRONIC KIDNEY DISEASE, WITH LONG-TERM CURRENT USE OF INSULIN (HCC): ICD-10-CM

## 2020-08-04 DIAGNOSIS — D50.9 IRON DEFICIENCY ANEMIA, UNSPECIFIED IRON DEFICIENCY ANEMIA TYPE: ICD-10-CM

## 2020-08-04 DIAGNOSIS — I10 ESSENTIAL HYPERTENSION: ICD-10-CM

## 2020-08-04 DIAGNOSIS — M1A.9XX1 CHRONIC GOUT WITH TOPHUS, UNSPECIFIED CAUSE, UNSPECIFIED SITE: ICD-10-CM

## 2020-08-04 DIAGNOSIS — E87.79 OTHER HYPERVOLEMIA: ICD-10-CM

## 2020-08-04 DIAGNOSIS — N18.30 TYPE 2 DIABETES MELLITUS WITH STAGE 3 CHRONIC KIDNEY DISEASE, WITH LONG-TERM CURRENT USE OF INSULIN (HCC): ICD-10-CM

## 2020-08-04 DIAGNOSIS — R80.1 PERSISTENT PROTEINURIA: ICD-10-CM

## 2020-08-04 DIAGNOSIS — R60.0 BILATERAL LEG EDEMA: ICD-10-CM

## 2020-08-04 DIAGNOSIS — N18.30 STAGE 3 CHRONIC KIDNEY DISEASE (HCC): Primary | ICD-10-CM

## 2020-08-04 DIAGNOSIS — E11.40 TYPE 2 DIABETES MELLITUS WITH DIABETIC NEUROPATHY, WITHOUT LONG-TERM CURRENT USE OF INSULIN (HCC): ICD-10-CM

## 2020-08-04 DIAGNOSIS — Z79.4 TYPE 2 DIABETES MELLITUS WITH STAGE 3 CHRONIC KIDNEY DISEASE, WITH LONG-TERM CURRENT USE OF INSULIN (HCC): ICD-10-CM

## 2020-08-04 DIAGNOSIS — N18.30 CHRONIC KIDNEY DISEASE, STAGE III (MODERATE) (HCC): Primary | ICD-10-CM

## 2020-08-04 DIAGNOSIS — E87.2 METABOLIC ACIDOSIS: ICD-10-CM

## 2020-08-04 LAB
ANION GAP SERPL CALCULATED.3IONS-SCNC: 6 MMOL/L (ref 4–13)
BASOPHILS # BLD AUTO: 0.06 THOUSANDS/ΜL (ref 0–0.1)
BASOPHILS NFR BLD AUTO: 1 % (ref 0–1)
BUN SERPL-MCNC: 44 MG/DL (ref 5–25)
CALCIUM SERPL-MCNC: 9.2 MG/DL (ref 8.3–10.1)
CHLORIDE SERPL-SCNC: 108 MMOL/L (ref 100–108)
CO2 SERPL-SCNC: 23 MMOL/L (ref 21–32)
CREAT SERPL-MCNC: 2.72 MG/DL (ref 0.6–1.3)
EOSINOPHIL # BLD AUTO: 0.73 THOUSAND/ΜL (ref 0–0.61)
EOSINOPHIL NFR BLD AUTO: 8 % (ref 0–6)
ERYTHROCYTE [DISTWIDTH] IN BLOOD BY AUTOMATED COUNT: 14.6 % (ref 11.6–15.1)
GFR SERPL CREATININE-BSD FRML MDRD: 23 ML/MIN/1.73SQ M
GLUCOSE P FAST SERPL-MCNC: 135 MG/DL (ref 65–99)
HCT VFR BLD AUTO: 30.8 % (ref 36.5–49.3)
HGB BLD-MCNC: 10.1 G/DL (ref 12–17)
IMM GRANULOCYTES # BLD AUTO: 0.03 THOUSAND/UL (ref 0–0.2)
IMM GRANULOCYTES NFR BLD AUTO: 0 % (ref 0–2)
IRON SATN MFR SERPL: 17 %
IRON SERPL-MCNC: 61 UG/DL (ref 65–175)
LYMPHOCYTES # BLD AUTO: 1.24 THOUSANDS/ΜL (ref 0.6–4.47)
LYMPHOCYTES NFR BLD AUTO: 13 % (ref 14–44)
MAGNESIUM SERPL-MCNC: 2.6 MG/DL (ref 1.6–2.6)
MCH RBC QN AUTO: 31.4 PG (ref 26.8–34.3)
MCHC RBC AUTO-ENTMCNC: 32.8 G/DL (ref 31.4–37.4)
MCV RBC AUTO: 96 FL (ref 82–98)
MONOCYTES # BLD AUTO: 0.74 THOUSAND/ΜL (ref 0.17–1.22)
MONOCYTES NFR BLD AUTO: 8 % (ref 4–12)
NEUTROPHILS # BLD AUTO: 6.96 THOUSANDS/ΜL (ref 1.85–7.62)
NEUTS SEG NFR BLD AUTO: 70 % (ref 43–75)
NRBC BLD AUTO-RTO: 0 /100 WBCS
PHOSPHATE SERPL-MCNC: 4.2 MG/DL (ref 2.3–4.1)
PLATELET # BLD AUTO: 272 THOUSANDS/UL (ref 149–390)
PMV BLD AUTO: 10 FL (ref 8.9–12.7)
POTASSIUM SERPL-SCNC: 5 MMOL/L (ref 3.5–5.3)
PTH-INTACT SERPL-MCNC: 109 PG/ML (ref 18.4–80.1)
RBC # BLD AUTO: 3.22 MILLION/UL (ref 3.88–5.62)
SODIUM SERPL-SCNC: 137 MMOL/L (ref 136–145)
TIBC SERPL-MCNC: 361 UG/DL (ref 250–450)
URATE SERPL-MCNC: 4.3 MG/DL (ref 4.2–8)
WBC # BLD AUTO: 9.76 THOUSAND/UL (ref 4.31–10.16)

## 2020-08-04 PROCEDURE — 85025 COMPLETE CBC W/AUTO DIFF WBC: CPT | Performed by: INTERNAL MEDICINE

## 2020-08-04 PROCEDURE — 83735 ASSAY OF MAGNESIUM: CPT

## 2020-08-04 PROCEDURE — 84550 ASSAY OF BLOOD/URIC ACID: CPT

## 2020-08-04 PROCEDURE — 83970 ASSAY OF PARATHORMONE: CPT

## 2020-08-04 PROCEDURE — 83550 IRON BINDING TEST: CPT

## 2020-08-04 PROCEDURE — 83540 ASSAY OF IRON: CPT

## 2020-08-04 PROCEDURE — 84100 ASSAY OF PHOSPHORUS: CPT

## 2020-08-04 PROCEDURE — 80048 BASIC METABOLIC PNL TOTAL CA: CPT

## 2020-08-04 PROCEDURE — 36415 COLL VENOUS BLD VENIPUNCTURE: CPT | Performed by: INTERNAL MEDICINE

## 2020-08-04 RX ORDER — PEN NEEDLE, DIABETIC 32GX 5/32"
NEEDLE, DISPOSABLE MISCELLANEOUS
Qty: 200 EACH | Refills: 3 | Status: SHIPPED | OUTPATIENT
Start: 2020-08-04 | End: 2021-08-04 | Stop reason: SDUPTHER

## 2020-08-04 NOTE — TELEPHONE ENCOUNTER
----- Message from Roby Chino DO sent at 8/4/2020  1:06 PM EDT -----  Creatinine is increasing I spoke with him he will take his furosemide 40 mg daily and can you send him a script for  a repeat bmp before I see him next week

## 2020-08-07 ENCOUNTER — TELEPHONE (OUTPATIENT)
Dept: NEPHROLOGY | Facility: CLINIC | Age: 65
End: 2020-08-07

## 2020-08-07 ENCOUNTER — APPOINTMENT (OUTPATIENT)
Dept: LAB | Facility: CLINIC | Age: 65
End: 2020-08-07
Payer: MEDICARE

## 2020-08-07 DIAGNOSIS — N18.30 TYPE 2 DIABETES MELLITUS WITH STAGE 3 CHRONIC KIDNEY DISEASE, WITH LONG-TERM CURRENT USE OF INSULIN (HCC): ICD-10-CM

## 2020-08-07 DIAGNOSIS — E11.22 TYPE 2 DIABETES MELLITUS WITH STAGE 3 CHRONIC KIDNEY DISEASE, WITH LONG-TERM CURRENT USE OF INSULIN (HCC): ICD-10-CM

## 2020-08-07 DIAGNOSIS — N18.30 STAGE 3 CHRONIC KIDNEY DISEASE (HCC): ICD-10-CM

## 2020-08-07 DIAGNOSIS — N18.30 CHRONIC KIDNEY DISEASE, STAGE III (MODERATE) (HCC): ICD-10-CM

## 2020-08-07 DIAGNOSIS — Z79.4 TYPE 2 DIABETES MELLITUS WITH STAGE 3 CHRONIC KIDNEY DISEASE, WITH LONG-TERM CURRENT USE OF INSULIN (HCC): ICD-10-CM

## 2020-08-07 DIAGNOSIS — I70.1 LEFT RENAL ARTERY STENOSIS (HCC): ICD-10-CM

## 2020-08-07 DIAGNOSIS — E78.2 MIXED HYPERLIPIDEMIA: ICD-10-CM

## 2020-08-07 LAB
ALBUMIN SERPL BCP-MCNC: 3.1 G/DL (ref 3.5–5)
ANION GAP SERPL CALCULATED.3IONS-SCNC: 8 MMOL/L (ref 4–13)
BUN SERPL-MCNC: 51 MG/DL (ref 5–25)
CALCIUM SERPL-MCNC: 9.2 MG/DL (ref 8.3–10.1)
CHLORIDE SERPL-SCNC: 110 MMOL/L (ref 100–108)
CO2 SERPL-SCNC: 21 MMOL/L (ref 21–32)
CREAT SERPL-MCNC: 2.99 MG/DL (ref 0.6–1.3)
FERRITIN SERPL-MCNC: 55 NG/ML (ref 8–388)
GFR SERPL CREATININE-BSD FRML MDRD: 21 ML/MIN/1.73SQ M
GLUCOSE P FAST SERPL-MCNC: 127 MG/DL (ref 65–99)
IRON SATN MFR SERPL: 19 %
IRON SERPL-MCNC: 63 UG/DL (ref 65–175)
PHOSPHATE SERPL-MCNC: 5.2 MG/DL (ref 2.3–4.1)
POTASSIUM SERPL-SCNC: 4.5 MMOL/L (ref 3.5–5.3)
SODIUM SERPL-SCNC: 139 MMOL/L (ref 136–145)
TIBC SERPL-MCNC: 335 UG/DL (ref 250–450)
URATE SERPL-MCNC: 4.5 MG/DL (ref 4.2–8)

## 2020-08-07 PROCEDURE — 82728 ASSAY OF FERRITIN: CPT

## 2020-08-07 PROCEDURE — 80069 RENAL FUNCTION PANEL: CPT

## 2020-08-07 PROCEDURE — 83550 IRON BINDING TEST: CPT

## 2020-08-07 PROCEDURE — 36415 COLL VENOUS BLD VENIPUNCTURE: CPT

## 2020-08-07 PROCEDURE — 84550 ASSAY OF BLOOD/URIC ACID: CPT

## 2020-08-07 PROCEDURE — 83540 ASSAY OF IRON: CPT

## 2020-08-10 ENCOUNTER — OFFICE VISIT (OUTPATIENT)
Dept: NEPHROLOGY | Facility: CLINIC | Age: 65
End: 2020-08-10
Payer: MEDICARE

## 2020-08-10 VITALS
SYSTOLIC BLOOD PRESSURE: 148 MMHG | TEMPERATURE: 97.8 F | RESPIRATION RATE: 16 BRPM | HEIGHT: 70 IN | BODY MASS INDEX: 29.06 KG/M2 | HEART RATE: 78 BPM | WEIGHT: 203 LBS | DIASTOLIC BLOOD PRESSURE: 78 MMHG

## 2020-08-10 DIAGNOSIS — E11.21: ICD-10-CM

## 2020-08-10 DIAGNOSIS — E87.2 METABOLIC ACIDOSIS: ICD-10-CM

## 2020-08-10 DIAGNOSIS — E87.79 OTHER HYPERVOLEMIA: ICD-10-CM

## 2020-08-10 DIAGNOSIS — I50.32 CHRONIC DIASTOLIC (CONGESTIVE) HEART FAILURE (HCC): ICD-10-CM

## 2020-08-10 DIAGNOSIS — I10 ESSENTIAL HYPERTENSION: ICD-10-CM

## 2020-08-10 DIAGNOSIS — N18.30 STAGE 3 CHRONIC KIDNEY DISEASE (HCC): ICD-10-CM

## 2020-08-10 DIAGNOSIS — I70.1 LEFT RENAL ARTERY STENOSIS (HCC): ICD-10-CM

## 2020-08-10 DIAGNOSIS — R80.1 PERSISTENT PROTEINURIA: ICD-10-CM

## 2020-08-10 DIAGNOSIS — E11.40 TYPE 2 DIABETES MELLITUS WITH DIABETIC NEUROPATHY, WITHOUT LONG-TERM CURRENT USE OF INSULIN (HCC): ICD-10-CM

## 2020-08-10 DIAGNOSIS — I10 HTN (HYPERTENSION): Primary | ICD-10-CM

## 2020-08-10 DIAGNOSIS — R60.0 BILATERAL LEG EDEMA: ICD-10-CM

## 2020-08-10 PROCEDURE — 3078F DIAST BP <80 MM HG: CPT | Performed by: INTERNAL MEDICINE

## 2020-08-10 PROCEDURE — 1160F RVW MEDS BY RX/DR IN RCRD: CPT | Performed by: INTERNAL MEDICINE

## 2020-08-10 PROCEDURE — 3044F HG A1C LEVEL LT 7.0%: CPT | Performed by: INTERNAL MEDICINE

## 2020-08-10 PROCEDURE — 2022F DILAT RTA XM EVC RTNOPTHY: CPT | Performed by: INTERNAL MEDICINE

## 2020-08-10 PROCEDURE — 3008F BODY MASS INDEX DOCD: CPT | Performed by: INTERNAL MEDICINE

## 2020-08-10 PROCEDURE — 3077F SYST BP >= 140 MM HG: CPT | Performed by: INTERNAL MEDICINE

## 2020-08-10 PROCEDURE — 3066F NEPHROPATHY DOC TX: CPT | Performed by: INTERNAL MEDICINE

## 2020-08-10 PROCEDURE — 1036F TOBACCO NON-USER: CPT | Performed by: INTERNAL MEDICINE

## 2020-08-10 PROCEDURE — 99214 OFFICE O/P EST MOD 30 MIN: CPT | Performed by: INTERNAL MEDICINE

## 2020-08-10 PROCEDURE — 4040F PNEUMOC VAC/ADMIN/RCVD: CPT | Performed by: INTERNAL MEDICINE

## 2020-08-10 RX ORDER — TORSEMIDE 20 MG/1
20 TABLET ORAL 2 TIMES DAILY
Qty: 60 TABLET | Refills: 3 | Status: SHIPPED | OUTPATIENT
Start: 2020-08-10 | End: 2020-09-23

## 2020-08-10 NOTE — PROGRESS NOTES
OFFICE follow-up- Nephrology   Tom Vinson 75 y o  male MRN: [id number]    Encounter: [id number]          ASSESSMENT and PLAN:  Berlin Crump was seen today for an initial consultation    He is 75years old with a past medical history of type 2 diabetes mellitus times 20 years, hypertension, gout  hyperlipidemia proteinuria and diabetic foot ulcer presents for follow-up    Diagnoses and all orders for this visit:    Essential (primary) hypertension  - blood pressures at home slightly higher now but less than 140 over 80  - he is currently on amlodipine 10 mg daily, metoprolol 50 mg twice daily, losartan 25 mg daily  -secondary workup included a repeat renal artery ultrasound which does show a left renal artery stenosis  - his echocardiogram was reviewed as well he should have further follow-up with cardiology to monitor his valvular heart disease- he does have LVH so blood pressure control is very important and this was discussed in detail with him in past  -his blood pressures now are on the higher side  -he has gained about 12 lb has increased dyspnea on exertion  -will discontinue losartan for now restart diuretics but will start torsemide 20 mg twice daily  -will check a chest x-ray as he has had pleural effusions and pulmonary edema in the past  -make further recommendation going for    Acute kidney injury on Diabetic kidney disease and persistent proteinuria  - in the setting of prolonged diabetes  -serologic workup in past has been negative  -he is currently on long-term cefepime for another 3 weeks, his most recent blood work shows his creatinine increased to 2 7 his baseline creatinine is 1 6-2 0  -unfortunately renal function worsening again, not on any nephrotoxic agent S not on any antibiotics this may be related to volume overload in his cardiomyopathy diuresis as above and monitor serial BMPs every 2 week  -given his history of multiple episodes of acute kidney injury this may just be progression of his diabetic kidney disease as well  -schedule ambulatory referral for CKD education  -will follow up in 4-6 weeks to ensure stability    Nephropathy-diabetic kidney disease  -serologies negative will monitor at this point    Gout  - on allopurinol 300 mg daily  - has been gout free, will monitor and continue    Bilateral leg edema  -now improved significantly decrease furosemide does    Left renal artery stenosis and history of diastolic heart failure  -renal artery Doppler showed 60% stenosis  -can be indication and cause of flash pulmonary edema  -mitral valve disease also present  -diuresis as above    Left foot osteomyelitis, peripheral vascular disease  -status post amputation now on 6 weeks of cefepime    Repeat blood work later this week and follow-up 6 weeks      HPI:  Ester Mcgill is a 75 y o male who was referred by Mani Esquivel for evaluation of  Initial evaluation    He is 75years old and has a history of type 2 diabetes mellitus currently on oral medications stage IIIA to 2 chronic kidney disease mitral valve prolapse,    He has been feeling relatively well, he states he has been eating well denies any acute complaints no chest pain or shortness of breath no fevers or chills he has gained about 12 lb recently he said he does have some increased dyspnea on exertion denies any chest pain or shortness of breath also noted to have some increased swelling in his lower extremity      ROS: All the systems were reviewed and were negative except as documented on the HPI  Allergies: Erby Rumps; Lamisil [terbinafine]; Lamisil [terbinafine];  Other; and Latex    Medications:   Current Outpatient Medications:     allopurinol (ZYLOPRIM) 300 mg tablet, Take 1 tablet (300 mg total) by mouth every morning, Disp: 90 tablet, Rfl: 2    amLODIPine (NORVASC) 10 mg tablet, TAKE 1 TABLET BY MOUTH EVERY DAY, Disp: 90 tablet, Rfl: 3    apixaban (ELIQUIS) 5 mg, Take 1 tablet (5 mg total) by mouth every 12 (twelve) hours, Disp: 60 tablet, Rfl: 11    Cholecalciferol (VITAMIN D-3) 1000 units CAPS, Take 1 capsule by mouth every morning  , Disp: , Rfl:     famotidine (PEPCID) 40 MG tablet, Take 1 tablet (40 mg total) by mouth daily, Disp: 90 tablet, Rfl: 3    glucose blood (ONE TOUCH ULTRA TEST) test strip, 1 each by Other route 3 (three) times a day Use to test blood sugar, Disp: 130 each, Rfl: 6    Insulin Degludec-Liraglutide (Insulin Degludec-Liraglutide) 100 units-3 6 mg/mL injection pen, INJECT 16 UNITS UNDER THE SKIN DAILY FOR 30 DAYS, Disp: 15 pen, Rfl: 6    Insulin Pen Needle (BD Pen Needle Zenaida U/F) 32G X 4 MM MISC, Use 1 daily, Disp: 200 each, Rfl: 3    metFORMIN (GLUCOPHAGE) 500 mg tablet, Take 1 tablet (500 mg total) by mouth 2 (two) times a day with meals, Disp: 180 tablet, Rfl: 3    metolazone (ZAROXOLYN) 5 mg tablet, Take 1 tablet (5 mg total) by mouth see administration instructions Take every 6 days prn as directed by physician for wt gain/edema, Disp: 5 tablet, Rfl: 5    metoprolol tartrate (LOPRESSOR) 50 mg tablet, Take 0 5 tablets (25 mg total) by mouth every 12 (twelve) hours, Disp: 60 tablet, Rfl: 0    Multiple Vitamin (MULTIVITAMIN) tablet, Take 1 tablet by mouth daily IN AM, Disp: , Rfl:     omega-3-acid ethyl esters (LOVAZA) 1 g capsule, Take 4 capsules (4 g total) by mouth 2 (two) times a day, Disp: 360 capsule, Rfl: 1    simvastatin (ZOCOR) 20 mg tablet, Take 1 tablet (20 mg total) by mouth daily at bedtime, Disp: 90 tablet, Rfl: 3    sodium bicarbonate 650 mg tablet, TAKE 1 TABLET (650 MG TOTAL) BY MOUTH 2 (TWO) TIMES DAILY AFTER MEALS, Disp: 60 tablet, Rfl: 2    tamsulosin (FLOMAX) 0 4 mg, Take 1 capsule (0 4 mg total) by mouth daily with dinner, Disp: 90 capsule, Rfl: 3    betamethasone valerate (VALISONE) 0 1 % cream, as needed , Disp: , Rfl: 3    ferrous gluconate (FERGON) 324 mg tablet, TAKE 1 TABLET BY MOUTH DAILY WITH BREAKFAST (Patient not taking: Reported on 8/10/2020), Disp: 30 tablet, Rfl: 0    torsemide (DEMADEX) 20 mg tablet, Take 1 tablet (20 mg total) by mouth 2 (two) times a day, Disp: 60 tablet, Rfl: 3    Past Medical History:   Diagnosis Date    Arthritis     OA    Bruise of both arms     forearms and both hands    Bruises easily     [facility]     Diabetes mellitus [facility]     Diabetic foot ulcer [facility]     Eczema     Erectile dysfunction     Gout     Hyperlipidemia     Hypertension     Murmur     Nephropathy     Osteoarthritis     [facility]     Seasonal allergies     Toe infection     bilat great toes    Vertigo     Walks frequently     Wears dentures     upper    Wears glasses      Past Surgical History:   Procedure Laterality Date    CARDIAC PACEMAKER PLACEMENT      CARPAL TUNNEL RELEASE Left     CARPAL TUNNEL RELEASE Right     CARPAL TUNNEL RELEASE      FOOT AMPUTATION Left 2/10/2020    Procedure: PARTIAL 1ST RAY AMPUTATION;  Surgeon: Charu Mercer DPM;  [facility];  Service: Podiatry    INCISION AND DRAINAGE OF WOUND Left 1/12/2020    Procedure: INCISION AND DRAINAGE (I&D) EXTREMITY AND REMOVAL OF SESMOID BONE;  Surgeon: Bharat Hawkins DPM;  [facility];  Service[facility] Podiatry    [facility]    KNEE ARTHROSCOPY Left     KNEE ARTHROSCOPY Right     KNEE SURGERY      ND AMPUTATION TOE,I-P JT Bilateral 5/2/2017    Procedure: PARTIAL AMPUTATION RIGHT AND LEFT HALLUX ;  Surgeon: Charu Mercer DPM;  [facility];  Service: Podiatry    ND AMPUTATION TOE,MT-P JT Left 7/25/2017    Procedure: 2ND TOE AMPUTATION;  Surgeon: Charu Mercer DPM;  [facility];  Service: Podiatry    SHOULDER ARTHROSCOPY Left     with screws,RTC    SHOULDER SURGERY      TOE AMPUTATION Left 1/8/2020    Procedure: HALLUX AMPUTATION;  Surgeon: Bharat Hawkins DPM;  [facility];  Service: Podiatry    VASECTOMY       Family History   Problem Relation Age of Onset    Heart disease Father     No Known Problems Mother     Hypertension Father     Pulmonary embolism Father       reports that he quit smoking about 11 years ago  His smoking use included cigarettes  He has a 30 00 pack-year smoking history  He has never used smokeless tobacco  He reports that he does not drink alcohol or use drugs  Physical Exam:   Vitals:    08/10/20 1119   BP: 148/78   BP Location: Left arm   Patient Position: Sitting   Cuff Size: Standard   Pulse: 78   Resp: 16   Temp: 97 8 °F (36 6 °C)   TempSrc: Tympanic   Weight: 92 1 kg (203 lb)   Height: 5' 10" (1 778 m)     Body mass index is 29 13 kg/m²      General: conscious, cooperative, in no acute distress  Eyes: conjunctivae pink, anicteric sclerae  ENT: lips and mucous membranes moist  Neck: supple, no JVD  Chest:  Decreased breath sounds  CVS: normal rate, regular rhythm  Abdomen: soft, non-tender, non-distended, normoactive bowel sounds  Extremities:  2+ edema  Skin: no rash  Neuro: awake, alert, oriented  Psych:  Pleasant affect      Lab Results:     Results for orders placed or performed in visit on 08/07/20   Renal function panel   Result Value Ref Range    Albumin 3 1 (L) 3 5 - 5 0 g/dL    Calcium 9 2 8 3 - 10 1 mg/dL    Phosphorus 5 2 (H) 2 3 - 4 1 mg/dL    BUN 51 (H) 5 - 25 mg/dL    Creatinine 2 99 (H) 0 60 - 1 30 mg/dL    Sodium 139 136 - 145 mmol/L    Potassium 4 5 3 5 - 5 3 mmol/L    Chloride 110 (H) 100 - 108 mmol/L    CO2 21 21 - 32 mmol/L    ANION GAP 8 4 - 13 mmol/L    eGFR 21 ml/min/1 73sq m    Glucose, Fasting 127 (H) 65 - 99 mg/dL   Uric acid   Result Value Ref Range    Uric Acid 4 5 4 2 - 8 0 mg/dL   Iron Saturation %   Result Value Ref Range    Iron Saturation 19 %    TIBC 335 250 - 450 ug/dL    Iron 63 (L) 65 - 175 ug/dL   Ferritin   Result Value Ref Range    Ferritin 55 8 - 388 ng/mL       Echocardiogram 8/9/15  - left ventricle had an EF of 60% wall thickness was mildly increased with mild concentric hypertrophy and an abnormal left ventricular relaxation  - left atrium was mildly to moderately dilated  - right atrium was mildly dilated  - mitral valve shows mild annular calcification    Renal artery ultrasound 6/27/19  RIGHT RENAL:  No evidence of significant arterial occlusive disease in the main renal artery  Pulsatile renal vein  Renovascular resistive index of 0 8, indicative of parenchymal disease  Renal/Aorta Ratio: 1 81  The Kidney measures 13 cm  LEFT RENAL:  There is evidence of a >60% stenosis in the ostium and mid renal artery  Pulsatile renal vein  Renovascular resistive index of 0 9, indicative of parenchymal disease  Renal/Aorta Ratio: 2 4  The Kidney measures 13 9 cm  MESENTERIC:  There is evidence of a >70% stenosis in the celiac artery  Superior mesenteric artery is patent  Portions of the record may have been created with voice recognition software  Occasional wrong word or "sound a like" substitutions may have occurred due to the inherent limitations of voice recognition software  Read the chart carefully and recognize, using context, where substitutions have occurred  If you have any questions, please contact the dictating provider

## 2020-08-10 NOTE — PATIENT INSTRUCTIONS
Chronic Kidney Disease   WHAT YOU NEED TO KNOW:   Chronic kidney disease (CKD) is the gradual and permanent loss of kidney function  It is also called chronic kidney failure, or chronic renal insufficiency  Normally, the kidneys remove fluid, chemicals, and waste from your blood  These wastes are turned into urine by your kidneys  CKD may worsen over time and lead to kidney failure  DISCHARGE INSTRUCTIONS:   Return to the emergency department if:   · You are confused and very drowsy  · You have a seizure  · You have shortness of breath  Contact your healthcare provider if:   · You suddenly gain or lose more weight than your healthcare provider has told you is okay  · You have itchy skin or a rash  · You urinate more or less than you normally do  · You have blood in your urine  · You have nausea and repeated vomiting  · You have fatigue or muscle weakness  · You have hiccups that will not stop  · You have questions or concerns about your condition or care  Medicines:   · Medicines  may be given to decrease blood pressure and get rid of extra fluid  You may also receive medicine to manage health conditions that may occur with CKD, such as anemia, diabetes, and heart disease  · Take your medicine as directed  Contact your healthcare provider if you think your medicine is not helping or if you have side effects  Tell him or her if you are allergic to any medicine  Keep a list of the medicines, vitamins, and herbs you take  Include the amounts, and when and why you take them  Bring the list or the pill bottles to follow-up visits  Carry your medicine list with you in case of an emergency  Follow up with your healthcare provider as directed: You will need to return for tests to monitor your kidney function  You may also be referred to a kidney specialist  Write down your questions so you remember to ask them during your visits  Manage other health conditions:   Follow your healthcare provider's directions on how to manage diabetes, high blood pressure, and heart disease  These conditions can make CKD worse  Talk to your healthcare provider before you take over-the-counter medicine  Medicines such as NSAIDs, stomach medicine, or laxatives may harm your kidneys  Weigh yourself daily:  Ask your healthcare provider what your weight should be  Ask how much liquid you should drink each day  CKD may cause you to gain or lose weight rapidly  Weigh yourself every day  Write down your weight, how much liquid you drink or eat, and how much you urinate each day  Contact your healthcare provider if your weight is higher or lower than it should be  Manage CKD:   · Maintain a healthy weight  Ask your healthcare provider how much you should weigh  Ask him to help you create a weight loss plan if you are overweight  · Exercise 30 to 60 minutes a day, 4 to 7 times a week, or as directed  Ask about the best exercise plan for you  Regular exercise can help you manage CKD, high blood pressure, and diabetes  · Follow your healthcare provider's advice about what to eat and drink  He may tell you to eat food low in sodium (salt), potassium, phosphorus, or protein  You may need to see a dietitian if you need help planning meals  Ask how much liquid to drink each day and which liquids are best for you  · Limit alcohol  Ask how much alcohol is safe for you to drink  A drink of alcohol is 12 ounces of beer, 5 ounces of wine, or 1½ ounces of liquor  · Do not smoke  Nicotine and other chemicals in cigarettes and cigars can cause lung and kidney damage  Ask your healthcare provider for information if you currently smoke and need help to quit  E-cigarettes or smokeless tobacco still contain nicotine  Talk to your healthcare provider before you use these products  · Ask your healthcare provider if you need vaccines    Infections such as pneumonia, influenza, and hepatitis can be more harmful or more likely to occur in a person who has CKD  Vaccines reduce your risk of infection with these viruses  © 2017 [facility] Information is for End User's use only and may not be sold, redistributed or otherwise used for commercial purposes  All illustrations and images included in CareNotes® are the copyrighted property of A D A M [employer] Inc  or Buster Giraldo  The above information is an  only  It is not intended as medical advice for individual conditions or treatments  Talk to your doctor, nurse or pharmacist before following any medical regimen to see if it is safe and effective for you

## 2020-08-11 ENCOUNTER — HOSPITAL ENCOUNTER (OUTPATIENT)
Dept: RADIOLOGY | Facility: HOSPITAL | Age: 65
Discharge: HOME | End: 2020-08-11
Payer: MEDICARE

## 2020-08-11 DIAGNOSIS — E11.21: ICD-10-CM

## 2020-08-11 DIAGNOSIS — I10 HTN (HYPERTENSION): ICD-10-CM

## 2020-08-11 DIAGNOSIS — E11.40 TYPE 2 DIABETES MELLITUS WITH DIABETIC NEUROPATHY, WITHOUT LONG-TERM CURRENT USE OF INSULIN (HCC): ICD-10-CM

## 2020-08-11 DIAGNOSIS — I10 ESSENTIAL HYPERTENSION: ICD-10-CM

## 2020-08-11 DIAGNOSIS — N18.30 STAGE 3 CHRONIC KIDNEY DISEASE (HCC): ICD-10-CM

## 2020-08-11 DIAGNOSIS — I50.32 CHRONIC DIASTOLIC (CONGESTIVE) HEART FAILURE (HCC): ICD-10-CM

## 2020-08-11 PROCEDURE — 71046 X-RAY EXAM CHEST 2 VIEWS: CPT

## 2020-08-14 ENCOUNTER — LAB (OUTPATIENT)
Dept: LAB | Facility: CLINIC | Age: 65
End: 2020-08-14
Payer: MEDICARE

## 2020-08-14 ENCOUNTER — TELEPHONE (OUTPATIENT)
Dept: NEPHROLOGY | Facility: CLINIC | Age: 65
End: 2020-08-14

## 2020-08-14 DIAGNOSIS — E87.2 METABOLIC ACIDOSIS: ICD-10-CM

## 2020-08-14 DIAGNOSIS — E11.22 TYPE 2 DIABETES MELLITUS WITH STAGE 3 CHRONIC KIDNEY DISEASE, WITH LONG-TERM CURRENT USE OF INSULIN (HCC): ICD-10-CM

## 2020-08-14 DIAGNOSIS — I10 ESSENTIAL HYPERTENSION: ICD-10-CM

## 2020-08-14 DIAGNOSIS — N18.30 STAGE 3 CHRONIC KIDNEY DISEASE (HCC): ICD-10-CM

## 2020-08-14 DIAGNOSIS — I50.32 CHRONIC DIASTOLIC (CONGESTIVE) HEART FAILURE (HCC): ICD-10-CM

## 2020-08-14 DIAGNOSIS — Z79.4 TYPE 2 DIABETES MELLITUS WITH STAGE 3 CHRONIC KIDNEY DISEASE, WITH LONG-TERM CURRENT USE OF INSULIN (HCC): ICD-10-CM

## 2020-08-14 DIAGNOSIS — E87.79 OTHER HYPERVOLEMIA: ICD-10-CM

## 2020-08-14 DIAGNOSIS — E11.40 TYPE 2 DIABETES MELLITUS WITH DIABETIC NEUROPATHY, WITHOUT LONG-TERM CURRENT USE OF INSULIN (HCC): ICD-10-CM

## 2020-08-14 DIAGNOSIS — I70.1 LEFT RENAL ARTERY STENOSIS (HCC): ICD-10-CM

## 2020-08-14 DIAGNOSIS — N18.30 TYPE 2 DIABETES MELLITUS WITH STAGE 3 CHRONIC KIDNEY DISEASE, WITH LONG-TERM CURRENT USE OF INSULIN (HCC): ICD-10-CM

## 2020-08-14 DIAGNOSIS — R60.0 BILATERAL LEG EDEMA: ICD-10-CM

## 2020-08-14 DIAGNOSIS — I10 HTN (HYPERTENSION): ICD-10-CM

## 2020-08-14 DIAGNOSIS — R80.1 PERSISTENT PROTEINURIA: ICD-10-CM

## 2020-08-14 DIAGNOSIS — E11.21: ICD-10-CM

## 2020-08-14 LAB
ALBUMIN SERPL BCP-MCNC: 3.3 G/DL (ref 3.5–5)
ALP SERPL-CCNC: 93 U/L (ref 46–116)
ALT SERPL W P-5'-P-CCNC: 25 U/L (ref 12–78)
ANION GAP SERPL CALCULATED.3IONS-SCNC: 9 MMOL/L (ref 4–13)
AST SERPL W P-5'-P-CCNC: 18 U/L (ref 5–45)
BACTERIA UR QL AUTO: ABNORMAL /HPF
BILIRUB SERPL-MCNC: 0.5 MG/DL (ref 0.2–1)
BILIRUB UR QL STRIP: NEGATIVE
BUN SERPL-MCNC: 53 MG/DL (ref 5–25)
CALCIUM SERPL-MCNC: 9.2 MG/DL (ref 8.3–10.1)
CHLORIDE SERPL-SCNC: 106 MMOL/L (ref 100–108)
CLARITY UR: CLEAR
CO2 SERPL-SCNC: 24 MMOL/L (ref 21–32)
COLOR UR: YELLOW
CREAT SERPL-MCNC: 2.83 MG/DL (ref 0.6–1.3)
CREAT UR-MCNC: 78.3 MG/DL
ERYTHROCYTE [DISTWIDTH] IN BLOOD BY AUTOMATED COUNT: 14.5 % (ref 11.6–15.1)
GFR SERPL CREATININE-BSD FRML MDRD: 22 ML/MIN/1.73SQ M
GLUCOSE P FAST SERPL-MCNC: 96 MG/DL (ref 65–99)
GLUCOSE UR STRIP-MCNC: ABNORMAL MG/DL
HCT VFR BLD AUTO: 29.5 % (ref 36.5–49.3)
HGB BLD-MCNC: 10 G/DL (ref 12–17)
HGB UR QL STRIP.AUTO: ABNORMAL
HYALINE CASTS #/AREA URNS LPF: ABNORMAL /LPF
KETONES UR STRIP-MCNC: NEGATIVE MG/DL
LEUKOCYTE ESTERASE UR QL STRIP: NEGATIVE
MAGNESIUM SERPL-MCNC: 2.4 MG/DL (ref 1.6–2.6)
MCH RBC QN AUTO: 31.1 PG (ref 26.8–34.3)
MCHC RBC AUTO-ENTMCNC: 33.9 G/DL (ref 31.4–37.4)
MCV RBC AUTO: 92 FL (ref 82–98)
NITRITE UR QL STRIP: NEGATIVE
NON-SQ EPI CELLS URNS QL MICRO: ABNORMAL /HPF
PH UR STRIP.AUTO: 6.5 [PH]
PHOSPHATE SERPL-MCNC: 5 MG/DL (ref 2.3–4.1)
PLATELET # BLD AUTO: 284 THOUSANDS/UL (ref 149–390)
PMV BLD AUTO: 9.7 FL (ref 8.9–12.7)
POTASSIUM SERPL-SCNC: 4.9 MMOL/L (ref 3.5–5.3)
PROT SERPL-MCNC: 7.3 G/DL (ref 6.4–8.2)
PROT UR STRIP-MCNC: ABNORMAL MG/DL
PROT UR-MCNC: 674 MG/DL
PROT/CREAT UR: 8.61 MG/G{CREAT} (ref 0–0.1)
RBC # BLD AUTO: 3.22 MILLION/UL (ref 3.88–5.62)
RBC #/AREA URNS AUTO: ABNORMAL /HPF
SODIUM SERPL-SCNC: 139 MMOL/L (ref 136–145)
SP GR UR STRIP.AUTO: 1.02 (ref 1–1.03)
UROBILINOGEN UR QL STRIP.AUTO: 0.2 E.U./DL
WBC # BLD AUTO: 8.57 THOUSAND/UL (ref 4.31–10.16)
WBC #/AREA URNS AUTO: ABNORMAL /HPF

## 2020-08-14 PROCEDURE — 85027 COMPLETE CBC AUTOMATED: CPT

## 2020-08-14 PROCEDURE — 80053 COMPREHEN METABOLIC PANEL: CPT

## 2020-08-14 PROCEDURE — 36415 COLL VENOUS BLD VENIPUNCTURE: CPT

## 2020-08-14 PROCEDURE — 84100 ASSAY OF PHOSPHORUS: CPT

## 2020-08-14 PROCEDURE — 82570 ASSAY OF URINE CREATININE: CPT | Performed by: INTERNAL MEDICINE

## 2020-08-14 PROCEDURE — 84156 ASSAY OF PROTEIN URINE: CPT | Performed by: INTERNAL MEDICINE

## 2020-08-14 PROCEDURE — 81001 URINALYSIS AUTO W/SCOPE: CPT | Performed by: INTERNAL MEDICINE

## 2020-08-14 PROCEDURE — 83735 ASSAY OF MAGNESIUM: CPT

## 2020-08-14 NOTE — TELEPHONE ENCOUNTER
----- Message from Carrillo Moe DO sent at 8/13/2020  5:24 PM EDT -----  Please let him know that his chest x-ray was reviewed his a left effusion has improved, no signs of volume overload, will await his blood work and make further recommendation

## 2020-08-18 ENCOUNTER — TELEPHONE (OUTPATIENT)
Dept: NEPHROLOGY | Facility: CLINIC | Age: 65
End: 2020-08-18

## 2020-08-18 DIAGNOSIS — N18.30 STAGE 3 CHRONIC KIDNEY DISEASE (HCC): Primary | ICD-10-CM

## 2020-08-18 NOTE — TELEPHONE ENCOUNTER
----- Message from Edd Deshpande DO sent at 8/18/2020  3:38 PM EDT -----  I spoke with him please order a repeat BMP for next week creatinine is trending back down now 2 8, if he continues to feel okay we can hold the losartan continue the torsemide 20 mg twice daily

## 2020-08-19 ENCOUNTER — PATIENT MESSAGE (OUTPATIENT)
Dept: FAMILY MEDICINE CLINIC | Facility: CLINIC | Age: 65
End: 2020-08-19

## 2020-08-19 DIAGNOSIS — M86.9 OSTEOMYELITIS OF LEFT FOOT, UNSPECIFIED TYPE (HCC): ICD-10-CM

## 2020-08-19 DIAGNOSIS — E11.22 TYPE 2 DIABETES MELLITUS WITH CHRONIC KIDNEY DISEASE, WITH LONG-TERM CURRENT USE OF INSULIN, UNSPECIFIED CKD STAGE (HCC): ICD-10-CM

## 2020-08-19 DIAGNOSIS — N18.30 TYPE 2 DIABETES MELLITUS WITH STAGE 3 CHRONIC KIDNEY DISEASE, WITHOUT LONG-TERM CURRENT USE OF INSULIN (HCC): ICD-10-CM

## 2020-08-19 DIAGNOSIS — E11.22 TYPE 2 DIABETES MELLITUS WITH STAGE 3 CHRONIC KIDNEY DISEASE, WITHOUT LONG-TERM CURRENT USE OF INSULIN (HCC): ICD-10-CM

## 2020-08-19 DIAGNOSIS — E16.2 HYPOGLYCEMIA: ICD-10-CM

## 2020-08-19 DIAGNOSIS — Z79.4 TYPE 2 DIABETES MELLITUS WITH CHRONIC KIDNEY DISEASE, WITH LONG-TERM CURRENT USE OF INSULIN, UNSPECIFIED CKD STAGE (HCC): ICD-10-CM

## 2020-08-21 DIAGNOSIS — N18.30 TYPE 2 DIABETES MELLITUS WITH STAGE 3 CHRONIC KIDNEY DISEASE, WITHOUT LONG-TERM CURRENT USE OF INSULIN (HCC): ICD-10-CM

## 2020-08-21 DIAGNOSIS — E16.2 HYPOGLYCEMIA: ICD-10-CM

## 2020-08-21 DIAGNOSIS — E11.69 TYPE 2 DIABETES MELLITUS WITH OTHER SPECIFIED COMPLICATION, UNSPECIFIED WHETHER LONG TERM INSULIN USE (HCC): ICD-10-CM

## 2020-08-21 DIAGNOSIS — E11.22 TYPE 2 DIABETES MELLITUS WITH STAGE 3 CHRONIC KIDNEY DISEASE, WITHOUT LONG-TERM CURRENT USE OF INSULIN (HCC): ICD-10-CM

## 2020-08-21 DIAGNOSIS — M86.9 OSTEOMYELITIS OF LEFT FOOT, UNSPECIFIED TYPE (HCC): ICD-10-CM

## 2020-08-22 DIAGNOSIS — M86.9 OSTEOMYELITIS OF LEFT FOOT, UNSPECIFIED TYPE (HCC): ICD-10-CM

## 2020-08-24 DIAGNOSIS — N18.30 TYPE 2 DIABETES MELLITUS WITH STAGE 3 CHRONIC KIDNEY DISEASE, WITHOUT LONG-TERM CURRENT USE OF INSULIN (HCC): ICD-10-CM

## 2020-08-24 DIAGNOSIS — E11.22 TYPE 2 DIABETES MELLITUS WITH STAGE 3 CHRONIC KIDNEY DISEASE, WITH LONG-TERM CURRENT USE OF INSULIN (HCC): ICD-10-CM

## 2020-08-24 DIAGNOSIS — E11.22 TYPE 2 DIABETES MELLITUS WITH STAGE 3 CHRONIC KIDNEY DISEASE, WITH LONG-TERM CURRENT USE OF INSULIN (HCC): Primary | ICD-10-CM

## 2020-08-24 DIAGNOSIS — N18.30 TYPE 2 DIABETES MELLITUS WITH STAGE 3 CHRONIC KIDNEY DISEASE, WITH LONG-TERM CURRENT USE OF INSULIN (HCC): ICD-10-CM

## 2020-08-24 DIAGNOSIS — Z79.4 TYPE 2 DIABETES MELLITUS WITH STAGE 3 CHRONIC KIDNEY DISEASE, WITH LONG-TERM CURRENT USE OF INSULIN (HCC): ICD-10-CM

## 2020-08-24 DIAGNOSIS — Z79.4 TYPE 2 DIABETES MELLITUS WITH STAGE 3 CHRONIC KIDNEY DISEASE, WITH LONG-TERM CURRENT USE OF INSULIN (HCC): Primary | ICD-10-CM

## 2020-08-24 DIAGNOSIS — E16.2 HYPOGLYCEMIA: ICD-10-CM

## 2020-08-24 DIAGNOSIS — N18.30 TYPE 2 DIABETES MELLITUS WITH STAGE 3 CHRONIC KIDNEY DISEASE, WITH LONG-TERM CURRENT USE OF INSULIN (HCC): Primary | ICD-10-CM

## 2020-08-24 DIAGNOSIS — E11.22 TYPE 2 DIABETES MELLITUS WITH STAGE 3 CHRONIC KIDNEY DISEASE, WITHOUT LONG-TERM CURRENT USE OF INSULIN (HCC): ICD-10-CM

## 2020-08-24 RX ORDER — METOPROLOL TARTRATE 50 MG/1
TABLET, FILM COATED ORAL
Qty: 60 TABLET | Refills: 0 | Status: SHIPPED | OUTPATIENT
Start: 2020-08-24 | End: 2020-09-23

## 2020-08-24 RX ORDER — BLOOD SUGAR DIAGNOSTIC
STRIP MISCELLANEOUS
Qty: 100 EACH | Refills: 6 | Status: SHIPPED | OUTPATIENT
Start: 2020-08-24 | End: 2020-09-02

## 2020-08-28 ENCOUNTER — LAB (OUTPATIENT)
Dept: LAB | Facility: CLINIC | Age: 65
End: 2020-08-28
Payer: MEDICARE

## 2020-08-28 ENCOUNTER — TELEPHONE (OUTPATIENT)
Dept: OTHER | Facility: HOSPITAL | Age: 65
End: 2020-08-28

## 2020-08-28 DIAGNOSIS — I50.32 CHRONIC DIASTOLIC (CONGESTIVE) HEART FAILURE (HCC): ICD-10-CM

## 2020-08-28 DIAGNOSIS — N18.4 STAGE 4 CHRONIC KIDNEY DISEASE (HCC): Primary | ICD-10-CM

## 2020-08-28 DIAGNOSIS — I10 ESSENTIAL HYPERTENSION: ICD-10-CM

## 2020-08-28 DIAGNOSIS — N18.30 TYPE 2 DIABETES MELLITUS WITH STAGE 3 CHRONIC KIDNEY DISEASE, WITHOUT LONG-TERM CURRENT USE OF INSULIN (HCC): ICD-10-CM

## 2020-08-28 DIAGNOSIS — N18.30 STAGE 3 CHRONIC KIDNEY DISEASE (HCC): ICD-10-CM

## 2020-08-28 DIAGNOSIS — E78.2 MIXED HYPERLIPIDEMIA: ICD-10-CM

## 2020-08-28 DIAGNOSIS — E11.21: ICD-10-CM

## 2020-08-28 DIAGNOSIS — E11.22 TYPE 2 DIABETES MELLITUS WITH STAGE 3 CHRONIC KIDNEY DISEASE, WITHOUT LONG-TERM CURRENT USE OF INSULIN (HCC): ICD-10-CM

## 2020-08-28 DIAGNOSIS — E11.40 TYPE 2 DIABETES MELLITUS WITH DIABETIC NEUROPATHY, WITHOUT LONG-TERM CURRENT USE OF INSULIN (HCC): ICD-10-CM

## 2020-08-28 DIAGNOSIS — I10 HTN (HYPERTENSION): ICD-10-CM

## 2020-08-28 LAB
ANION GAP SERPL CALCULATED.3IONS-SCNC: 7 MMOL/L (ref 4–13)
BUN SERPL-MCNC: 67 MG/DL (ref 5–25)
CALCIUM SERPL-MCNC: 9.4 MG/DL (ref 8.3–10.1)
CHLORIDE SERPL-SCNC: 108 MMOL/L (ref 100–108)
CO2 SERPL-SCNC: 21 MMOL/L (ref 21–32)
CREAT SERPL-MCNC: 3.15 MG/DL (ref 0.6–1.3)
GFR SERPL CREATININE-BSD FRML MDRD: 20 ML/MIN/1.73SQ M
GLUCOSE P FAST SERPL-MCNC: 131 MG/DL (ref 65–99)
POTASSIUM SERPL-SCNC: 4.6 MMOL/L (ref 3.5–5.3)
SODIUM SERPL-SCNC: 136 MMOL/L (ref 136–145)

## 2020-08-28 PROCEDURE — 36415 COLL VENOUS BLD VENIPUNCTURE: CPT

## 2020-08-28 PROCEDURE — 80048 BASIC METABOLIC PNL TOTAL CA: CPT

## 2020-08-28 NOTE — TELEPHONE ENCOUNTER
Most recent BMP reviewed and revealed creatinine increased to 3 15 from previous at 2 83 with discontinuation of losartan and starting torsemide 20 mg b i d   Patient reports weight loss of 7 lb and blood pressure improved to 140/65  No noted edema in lower extremity and overall feeling well  Instructed patient to decrease torsemide to daily dosing and to check BMP in one week  Once reviewed further recommendations will be forthcoming  Patient has also been instructed to call office after decreasing to daily dosing if edema or weight gain to call the office most likely will resume b i d  Dosing    Patient agreed with plan

## 2020-08-31 NOTE — TELEPHONE ENCOUNTER
Labs reviewed, agree with plan set forth, will follow up on repeat BMP this week    Thanks Danielito Malin

## 2020-09-02 DIAGNOSIS — Z79.4 TYPE 2 DIABETES MELLITUS WITH STAGE 3 CHRONIC KIDNEY DISEASE, WITH LONG-TERM CURRENT USE OF INSULIN (HCC): ICD-10-CM

## 2020-09-02 DIAGNOSIS — E11.22 TYPE 2 DIABETES MELLITUS WITH STAGE 3 CHRONIC KIDNEY DISEASE, WITH LONG-TERM CURRENT USE OF INSULIN (HCC): ICD-10-CM

## 2020-09-02 DIAGNOSIS — N18.30 TYPE 2 DIABETES MELLITUS WITH STAGE 3 CHRONIC KIDNEY DISEASE, WITH LONG-TERM CURRENT USE OF INSULIN (HCC): ICD-10-CM

## 2020-09-02 RX ORDER — BLOOD SUGAR DIAGNOSTIC
STRIP MISCELLANEOUS
Qty: 100 EACH | Refills: 3 | Status: SHIPPED | OUTPATIENT
Start: 2020-09-02 | End: 2021-01-05

## 2020-09-03 ENCOUNTER — APPOINTMENT (OUTPATIENT)
Dept: LAB | Facility: CLINIC | Age: 65
End: 2020-09-03
Payer: MEDICARE

## 2020-09-03 DIAGNOSIS — I50.32 CHRONIC DIASTOLIC (CONGESTIVE) HEART FAILURE (HCC): ICD-10-CM

## 2020-09-03 DIAGNOSIS — Z79.4 TYPE 2 DIABETES MELLITUS WITH STAGE 3 CHRONIC KIDNEY DISEASE, WITH LONG-TERM CURRENT USE OF INSULIN (HCC): ICD-10-CM

## 2020-09-03 DIAGNOSIS — N18.30 STAGE 3 CHRONIC KIDNEY DISEASE (HCC): ICD-10-CM

## 2020-09-03 DIAGNOSIS — N18.30 TYPE 2 DIABETES MELLITUS WITH STAGE 3 CHRONIC KIDNEY DISEASE, WITH LONG-TERM CURRENT USE OF INSULIN (HCC): ICD-10-CM

## 2020-09-03 DIAGNOSIS — N18.4 STAGE 4 CHRONIC KIDNEY DISEASE (HCC): ICD-10-CM

## 2020-09-03 DIAGNOSIS — E11.22 TYPE 2 DIABETES MELLITUS WITH STAGE 3 CHRONIC KIDNEY DISEASE, WITH LONG-TERM CURRENT USE OF INSULIN (HCC): ICD-10-CM

## 2020-09-03 DIAGNOSIS — E11.21: ICD-10-CM

## 2020-09-03 DIAGNOSIS — I10 ESSENTIAL HYPERTENSION: ICD-10-CM

## 2020-09-03 DIAGNOSIS — E11.40 TYPE 2 DIABETES MELLITUS WITH DIABETIC NEUROPATHY, WITHOUT LONG-TERM CURRENT USE OF INSULIN (HCC): ICD-10-CM

## 2020-09-03 DIAGNOSIS — I10 HTN (HYPERTENSION): ICD-10-CM

## 2020-09-03 LAB
ALBUMIN SERPL BCP-MCNC: 3.4 G/DL (ref 3.5–5)
ALP SERPL-CCNC: 78 U/L (ref 46–116)
ALT SERPL W P-5'-P-CCNC: 21 U/L (ref 12–78)
ANION GAP SERPL CALCULATED.3IONS-SCNC: 7 MMOL/L (ref 4–13)
AST SERPL W P-5'-P-CCNC: 19 U/L (ref 5–45)
BILIRUB SERPL-MCNC: 0.57 MG/DL (ref 0.2–1)
BUN SERPL-MCNC: 61 MG/DL (ref 5–25)
CALCIUM SERPL-MCNC: 9.4 MG/DL (ref 8.3–10.1)
CHLORIDE SERPL-SCNC: 109 MMOL/L (ref 100–108)
CHOLEST SERPL-MCNC: 115 MG/DL (ref 50–200)
CO2 SERPL-SCNC: 22 MMOL/L (ref 21–32)
CREAT SERPL-MCNC: 3 MG/DL (ref 0.6–1.3)
EST. AVERAGE GLUCOSE BLD GHB EST-MCNC: 120 MG/DL
GFR SERPL CREATININE-BSD FRML MDRD: 21 ML/MIN/1.73SQ M
GLUCOSE P FAST SERPL-MCNC: 91 MG/DL (ref 65–99)
HBA1C MFR BLD: 5.8 %
HDLC SERPL-MCNC: 32 MG/DL
LDLC SERPL CALC-MCNC: 56 MG/DL (ref 0–100)
POTASSIUM SERPL-SCNC: 4.6 MMOL/L (ref 3.5–5.3)
PROT SERPL-MCNC: 7.2 G/DL (ref 6.4–8.2)
SODIUM SERPL-SCNC: 138 MMOL/L (ref 136–145)
TRIGL SERPL-MCNC: 137 MG/DL

## 2020-09-03 PROCEDURE — 83036 HEMOGLOBIN GLYCOSYLATED A1C: CPT

## 2020-09-03 PROCEDURE — 80061 LIPID PANEL: CPT

## 2020-09-03 PROCEDURE — 36415 COLL VENOUS BLD VENIPUNCTURE: CPT

## 2020-09-03 PROCEDURE — 80053 COMPREHEN METABOLIC PANEL: CPT

## 2020-09-03 NOTE — RESULT ENCOUNTER NOTE
Labs reviewed  Patient remains under excellent control  Hemoglobin of 5 8  Will be discussed in further detail at upcoming appointment at the end of the month

## 2020-09-04 ENCOUNTER — TELEPHONE (OUTPATIENT)
Dept: NEPHROLOGY | Facility: CLINIC | Age: 65
End: 2020-09-04

## 2020-09-04 DIAGNOSIS — I10 ESSENTIAL HYPERTENSION: Primary | ICD-10-CM

## 2020-09-04 DIAGNOSIS — N18.30 STAGE 3 CHRONIC KIDNEY DISEASE (HCC): ICD-10-CM

## 2020-09-04 NOTE — TELEPHONE ENCOUNTER
----- Message from [address] sent at 9/3/2020  2:20 PM EDT -----  Hi! Repeat BMP reviewed and creatinine stable at 3 0  Please ask patient how is his weight? Gaining or losing? How is his edema  If no change have him continue daily dosing of Torsemide 20 mg for now   Get repeat BMP in 2 weeks to ensure stability of renal function  Thanks  Huseyin Hatfield

## 2020-09-04 NOTE — TELEPHONE ENCOUNTER
I spoke to the patient, he is aware his kidney function is stable, and he states he has gained a little weight, maybe 3lbs but no swelling that he has noticed  He is currently on the Torsemide 20mg daily and will continue that  He will recheck a BMP in 2 weeks

## 2020-09-08 ENCOUNTER — TELEPHONE (OUTPATIENT)
Dept: NEPHROLOGY | Facility: CLINIC | Age: 65
End: 2020-09-08

## 2020-09-08 NOTE — TELEPHONE ENCOUNTER
----- Message from Landry Martino [place] sent at 9/8/2020  9:53 AM EDT -----  Keith Norman, can you please schedule this pt with either Vamsi Boyer or Rachel Cramer? Thanks!   ----- Message -----  From: Jeffery Villasenor DO  Sent: 9/3/2020   3:26 PM EDT  To: Juju Dela Cruz MA, Lloyd Renee    Can we move up his appointment to mid September? After the blood work is checked? Can see myself or Rachel Cramer  Thanks    ----- Message -----  From: PAT Damon  Sent: 9/3/2020   2:20 PM EDT  To: Jeffery Villasenor DO, #    Hi! Repeat BMP reviewed and creatinine stable at 3 0  Please ask patient how is his weight? Gaining or losing? How is his edema  If no change have him continue daily dosing of Torsemide 20 mg for now   Get repeat BMP in 2 weeks to ensure stability of renal function  Thanks  Rachel Cramer

## 2020-09-10 ENCOUNTER — CLINICAL SUPPORT (OUTPATIENT)
Dept: FAMILY MEDICINE CLINIC | Facility: CLINIC | Age: 65
End: 2020-09-10
Payer: MEDICARE

## 2020-09-10 DIAGNOSIS — Z23 FLU VACCINE NEED: Primary | ICD-10-CM

## 2020-09-10 PROCEDURE — G0008 ADMIN INFLUENZA VIRUS VAC: HCPCS

## 2020-09-10 PROCEDURE — 90682 RIV4 VACC RECOMBINANT DNA IM: CPT

## 2020-09-10 NOTE — PROGRESS NOTES
Patient requested not to have the high-dose influenza vaccine  Per verbal from Dr Ashley Ly [facility] to administer Flublok  Flublok vaccine given in right deltoid

## 2020-09-15 ENCOUNTER — REMOTE DEVICE CLINIC VISIT (OUTPATIENT)
Dept: CARDIOLOGY CLINIC | Facility: CLINIC | Age: 65
End: 2020-09-15
Payer: MEDICARE

## 2020-09-15 DIAGNOSIS — Z95.0 PRESENCE OF PERMANENT CARDIAC PACEMAKER: Primary | ICD-10-CM

## 2020-09-15 PROCEDURE — 93296 REM INTERROG EVL PM/IDS: CPT | Performed by: INTERNAL MEDICINE

## 2020-09-15 PROCEDURE — 93294 REM INTERROG EVL PM/LDLS PM: CPT | Performed by: INTERNAL MEDICINE

## 2020-09-15 NOTE — PROGRESS NOTES
MDT DUAL CHAMBER PPM/ACTIVE SYSTEM IS MRI CONDITIONAL   CARELINK TRANSMISSION:  BATTERY VOLTAGE ADEQUATE (10 1 YR)     AP 45 5%  100% (>40%/DEPENDENT/MVP ON)   ALL LEAD PARAMETERS WITHIN NORMAL LIMITS   NO HIGH RATE EPISODES   NORMAL DEVICE FUNCTION   RG

## 2020-09-18 ENCOUNTER — APPOINTMENT (OUTPATIENT)
Dept: LAB | Facility: CLINIC | Age: 65
End: 2020-09-18
Payer: MEDICARE

## 2020-09-18 DIAGNOSIS — N18.30 STAGE 3 CHRONIC KIDNEY DISEASE (HCC): ICD-10-CM

## 2020-09-18 DIAGNOSIS — I10 ESSENTIAL HYPERTENSION: ICD-10-CM

## 2020-09-18 LAB
ANION GAP SERPL CALCULATED.3IONS-SCNC: 7 MMOL/L (ref 4–13)
BUN SERPL-MCNC: 41 MG/DL (ref 5–25)
CALCIUM SERPL-MCNC: 9.5 MG/DL (ref 8.3–10.1)
CHLORIDE SERPL-SCNC: 108 MMOL/L (ref 100–108)
CO2 SERPL-SCNC: 23 MMOL/L (ref 21–32)
CREAT SERPL-MCNC: 2.94 MG/DL (ref 0.6–1.3)
GFR SERPL CREATININE-BSD FRML MDRD: 21 ML/MIN/1.73SQ M
GLUCOSE P FAST SERPL-MCNC: 134 MG/DL (ref 65–99)
POTASSIUM SERPL-SCNC: 5.2 MMOL/L (ref 3.5–5.3)
SODIUM SERPL-SCNC: 138 MMOL/L (ref 136–145)

## 2020-09-18 PROCEDURE — 80048 BASIC METABOLIC PNL TOTAL CA: CPT

## 2020-09-18 PROCEDURE — 36415 COLL VENOUS BLD VENIPUNCTURE: CPT

## 2020-09-22 PROBLEM — N18.4 STAGE 4 CHRONIC KIDNEY DISEASE (HCC): Status: ACTIVE | Noted: 2018-11-14

## 2020-09-22 NOTE — PROGRESS NOTES
[facility] FOLLOW UP - Nephrology   Deng Ya 77 y o  male MRN: [id number]       ASSESSMENT and PLAN:  Bright Conde was seen today for follow-up and chronic kidney disease  Diagnoses and all orders for this visit:    Acute renal failure, unspecified acute renal failure type ([facility] [address] )  -     Basic metabolic panel; Future    Bilateral leg edema    Left renal artery stenosis (HCC)    Renovascular hypertension    Peripheral arterial disease (HCC)    Chronic diastolic (congestive) heart failure (HCC)    Type 2 diabetes mellitus with diabetic neuropathy, without long-term current use of insulin (HCC)    Stage 3 chronic kidney disease ([facility] [address] )      Acute kidney injury with diabetic nephropathy:  Likely progression however also concern with previous volume overload in the setting of cardiomyopathy, diuretic use in the setting of loss of autoregulation with losartan  -previously, creatinine increased to 3 15 on 08/28/2020 with b i d  Dosing of Lasix now on daily dosing  -most recent creatinine 2 94 on 09/18/2020  -losartan remains on hold  -renal ultrasound on 03/23/2020 reveals normal echogenicity and contour bilaterally no suspicious masses or hydronephrosis    Small right renal cyst noted  -patient for BMP on 10/02/2020 once reviewed further recommendations will be forthcoming  -patient has return appointment on 10/06/2020 with Edgar Treviño  -will need to continue to monitor for new baseline    Chronic kidney disease III:  Suspect secondary to diabetic nephropathy and hypertension nephrosclerosis-likely progressive renal disease  -after review medical records previous baseline creatinine 1 7-2 0 now in the high two range  -has had recent hospitalization acute kidney injury-may have new baseline  -found to have nephrotic range proteinuria with UPCR 8 42  -likely will require RRT in near future    Proteinuria:  Found to have nephrotic range proteinuria-likely progressive   -workup: ANCA negative, anti-GBM negative, RF negative, GONZALO negative, anti dsDNA negative, HIV/hepatitis panel negative, complements within normal limits    -doubt renal biopsy would provide any benefits for treatment    Renal vascular Hypertension:  BP above goal 156/70  -patient reporting home blood pressure checks 140s to 150s over 80s  -currently on metoprolol 25 mg 2 times daily, amlodipine 10 mg daily, and furosemide 20 mg daily  -will increase Metoprolol 50 mg twice daily   -patient to monitor blood pressure at home and bring in blood pressure log with next office visit  -for return appointment on October 6, 2020    Left renal artery stenosis:  Renal artery Doppler 06/27/2019 revealed 60% left renal artery stenosis  -can cause flash pulmonary edema  -patient also with mitral valve disease    Acid-base:  Metabolic acidosis  -current bicarb 23  -continues on sodium bicarbonate tablets 650 mg b i d   -continue for now    Congestive heart failure:  Echocardiogram 01/10/2020 reveals EF of 55% with mild concentric hypertrophy  -continues on Lasix 20 mg daily  -metolazone p r n  For weight gain-however patient reports never has used this  -patient follows with Cardiology as outpatient    Lower extremity edema:  Continues with blood plus one edema bilaterally  -compression stockings in place  -patient reports improvement    Peripheral artery disease: With previous osteomyelitis of left lower extremity-status post amputation and antibiotics with cefepime for six weeks  -vascular surgery following    Diabetes:  Continue with good blood sugar control  Per primary team        Patient Instructions   All questions asked and answered  The patient has been instructed to call office at [phone] with any questions or concerns      The patient has been instructed to obtain prescribed blood work and urine studies prior to next appointment  Avoid NSAID products to include Motrin, Ibuprofen, Aleve, Naprosyn, or naproxen   Continue to monitor BP at home and bring in BP log with next visits  Get BMP on 10/2/2020  Low sodium diet          HPI: Hilda Trujillo is a 77 y o  male who is here for Follow-up and Chronic Kidney Disease  Patient returns to office for CKD, volume, edema and hypertension follow-up  He was last seen on 8/10/2020 with Dr Jaime Rolle and at that time, creatinine was increased thought to be secondary to volume overload versus prior multiple episodes of ELZBIETA  Diuretics were adjusted Torsemide 20 mg BID and was referred to kidney smart class  Since his appointment, chest xray was obtained and did not reveal vascular congestion and left effusion was improved  Also, creatinine increased to 3 15 with BID dosing of Torsemide, however patient reported 7 pound weight loss  Torsemide decreased to daily  Repeat BMP on 9/18/2020 reviewed and revealed creatinine 2 94, potassium 5 2 sodium 138  Medication list reviewed  On discussion, he reports home scale has been consistent around 202  Still with edema but better  Wearing compression stockings  He completed the kidney smart class  He remains off Losartan  He reports feeling better  Has never taken Metolazone  He reports that he feels great and was outside working in the yard  Denies chest pain, shortness of breath, dizziness, lightheadedness, nausea, vomiting, urinary issues, fevers or chills  ROS:   All the systems were reviewed and were negative except as documented on the HPI  Allergies: Burnetta Drgisela; Lamisil [terbinafine]; Lamisil [terbinafine];  Other; and Latex    Medications:   Current Outpatient Medications:     allopurinol (ZYLOPRIM) 300 mg tablet, Take 1 tablet (300 mg total) by mouth every morning, Disp: 90 tablet, Rfl: 2    amLODIPine (NORVASC) 10 mg tablet, TAKE 1 TABLET BY MOUTH EVERY DAY, Disp: 90 tablet, Rfl: 3    apixaban (ELIQUIS) 5 mg, Take 1 tablet (5 mg total) by mouth every 12 (twelve) hours, Disp: 60 tablet, Rfl: 11    betamethasone valerate (VALISONE) 0 1 % cream, as needed , Disp: , Rfl: 3    Cholecalciferol (VITAMIN D-3) 1000 units CAPS, Take 1 capsule by mouth every morning  , Disp: , Rfl:     famotidine (PEPCID) 40 MG tablet, Take 1 tablet (40 mg total) by mouth daily, Disp: 90 tablet, Rfl: 3    ferrous gluconate (FERGON) 324 mg tablet, TAKE 1 TABLET BY MOUTH DAILY WITH BREAKFAST, Disp: 30 tablet, Rfl: 0    glucose blood (OneTouch Ultra) test strip, USE TO TEST BLOOD SUGAR 3 TIMES A DAY, Disp: 100 each, Rfl: 3    Insulin Degludec-Liraglutide (Insulin Degludec-Liraglutide) 100 units-3 6 mg/mL injection pen, INJECT 16 UNITS UNDER THE SKIN DAILY FOR 30 DAYS, Disp: 15 pen, Rfl: 6    Insulin Pen Needle (BD Pen Needle Zenaida U/F) 32G X 4 MM MISC, Use 1 daily, Disp: 200 each, Rfl: 3    metFORMIN (GLUCOPHAGE) 500 mg tablet, Take 1 tablet (500 mg total) by mouth 2 (two) times a day with meals, Disp: 180 tablet, Rfl: 3    metolazone (ZAROXOLYN) 5 mg tablet, Take 1 tablet (5 mg total) by mouth see administration instructions Take every 6 days prn as directed by physician for wt gain/edema, Disp: 5 tablet, Rfl: 5    metoprolol tartrate (LOPRESSOR) 25 mg tablet, Take 1 tablet (25 mg total) by mouth every 12 (twelve) hours, Disp: 180 tablet, Rfl: 3    Multiple Vitamin (MULTIVITAMIN) tablet, Take 1 tablet by mouth daily IN AM, Disp: , Rfl:     omega-3-acid ethyl esters (LOVAZA) 1 g capsule, Take 4 capsules (4 g total) by mouth 2 (two) times a day, Disp: 360 capsule, Rfl: 1    simvastatin (ZOCOR) 20 mg tablet, Take 1 tablet (20 mg total) by mouth daily at bedtime, Disp: 90 tablet, Rfl: 3    sodium bicarbonate 650 mg tablet, TAKE 1 TABLET (650 MG TOTAL) BY MOUTH 2 (TWO) TIMES DAILY AFTER MEALS, Disp: 60 tablet, Rfl: 2    tamsulosin (FLOMAX) 0 4 mg, Take 1 capsule (0 4 mg total) by mouth daily with dinner, Disp: 90 capsule, Rfl: 3    torsemide (DEMADEX) 20 mg tablet, Take 1 tablet (20 mg total) by mouth 2 (two) times a day, Disp: 60 tablet, Rfl: 3    metoprolol tartrate (LOPRESSOR) 50 mg tablet, TAKE 1/2 TABLET BY MOUTH EVERY 12 HOURS (Patient not taking: Reported on 8/25/2020), Disp: 60 tablet, Rfl: 0    polyethylene glycol-electrolytes (TriLyte) 4000 mL solution, Take 4,000 mL by mouth once for 1 dose, Disp: 4000 mL, Rfl: 0    Past Medical History:   Diagnosis Date    Arthritis     OA    Bruise of both arms     forearms and both hands    Bruises easily     CHF (congestive heart failure) ([facility])     Diabetes mellitus ([facility] [address] )     [facility]     Eczema     Erectile dysfunction     Gout     Hyperlipidemia     Hypertension     Murmur     Nephropathy     Osteoarthritis     PAD (peripheral artery disease) ([facility])     Seasonal allergies     Toe infection     bilat great toes    Vertigo     Walks frequently     Wears dentures     upper    Wears glasses      Past Surgical History:   Procedure Laterality Date    CARDIAC PACEMAKER PLACEMENT      CARPAL TUNNEL RELEASE Left     CARPAL TUNNEL RELEASE Right     CARPAL TUNNEL RELEASE      COLONOSCOPY  08/2020    FOOT AMPUTATION Left 2/10/2020    Procedure: PARTIAL 1ST RAY AMPUTATION;  Surgeon: Iqra Rosenbaum DPM;  [facility];  Service: Podiatry    INCISION AND DRAINAGE OF WOUND Left 1/12/2020    Procedure: INCISION AND DRAINAGE (I&D) EXTREMITY AND REMOVAL OF SESMOID BONE;  Surgeon: Kayla Glasgow DPM;  [facility];  Service: Podiatry    IR PICC LINE REPOSITION  1/14/2020    KNEE ARTHROSCOPY Left     KNEE ARTHROSCOPY Right     KNEE SURGERY      MS AMPUTATION TOE,I-P JT Bilateral 5/2/2017    Procedure: PARTIAL AMPUTATION RIGHT AND LEFT HALLUX ;  Surgeon: Iqra Rosenbaum DPM;  [facility];  Service: Podiatry    MS AMPUTATION TOE,MT-P JT Left 7/25/2017    Procedure: 2ND TOE AMPUTATION;  Surgeon: Iqra Rosenbaum DPM;  [facility];  Service: Podiatry    SHOULDER ARTHROSCOPY Left     with screws,RTC    SHOULDER SURGERY      TOE AMPUTATION Left 1/8/2020    Procedure: HALLUX AMPUTATION;  Surgeon: Kayla Glasgow LIZBETH;  [facility];  Service: Podiatry    UPPER GASTROINTESTINAL ENDOSCOPY  03/2020    Intestinal metaplasia prepyloric    VASECTOMY       Family History   Problem Relation Age of Onset    Heart disease Father     No Known Problems Mother     Hypertension Father     Pulmonary embolism Father       reports that he quit smoking about 11 years ago  His smoking use included cigarettes  He has a 30 00 pack-year smoking history  He has never used smokeless tobacco  He reports that he does not drink alcohol or use drugs  Physical Exam:   Vitals:    09/23/20 1527   BP: 156/70   BP Location: Left arm   Patient Position: Sitting   Cuff Size: Standard   Pulse: 78   Resp: 16   Temp: 98 °F (36 7 °C)   TempSrc: Temporal   Weight: 93 kg (205 lb)   Height: 5' 10" (1 778 m)     Body mass index is 29 41 kg/m²      General: cooperative, in not acute distress  Eyes: conjunctivae pink, anicteric sclerae  ENT: lips and mucous membranes moist  Neck: supple, no JVD  Chest: clear breath sounds bilateral, no crackles, ronchus or wheezing, slight decreased bases  CVS:  normal rate, regular rhythm  Abdomen: soft, non-tender, non-distended, normoactive bowel sounds  Back: no CVA tenderness  Extremities:  Plus one pitting edema of both legs left greater than right compression stockings in place  Skin: no rash, warm and dry  Neuro: awake, alert, oriented      Lab Results:  Results for orders placed or performed in visit on 00/57/51   Basic metabolic panel   Result Value Ref Range    Sodium 138 136 - 145 mmol/L    Potassium 5 2 3 5 - 5 3 mmol/L    Chloride 108 100 - 108 mmol/L    CO2 23 21 - 32 mmol/L    ANION GAP 7 4 - 13 mmol/L    BUN 41 (H) 5 - 25 mg/dL    Creatinine 2 94 (H) 0 60 - 1 30 mg/dL    Glucose, Fasting 134 (H) 65 - 99 mg/dL    Calcium 9 5 8 3 - 10 1 mg/dL    eGFR 21 ml/min/1 73sq m       Results from last 7 days   Lab Units 09/18/20  0831   SODIUM mmol/L 138   POTASSIUM mmol/L 5 2   CHLORIDE mmol/L 108   CO2 mmol/L 23   BUN mg/dL 41*   CREATININE mg/dL 2 94*   CALCIUM mg/dL 9 5           Portions of the record may have been created with voice recognition software  Occasional wrong word or "sound a like" substitutions may have occurred due to the inherent limitations of voice recognition software   Read the chart carefully and recognize,

## 2020-09-23 ENCOUNTER — OFFICE VISIT (OUTPATIENT)
Dept: NEPHROLOGY | Facility: CLINIC | Age: 65
End: 2020-09-23
Payer: MEDICARE

## 2020-09-23 VITALS
SYSTOLIC BLOOD PRESSURE: 156 MMHG | BODY MASS INDEX: 29.35 KG/M2 | WEIGHT: 205 LBS | HEART RATE: 78 BPM | TEMPERATURE: 98 F | HEIGHT: 70 IN | DIASTOLIC BLOOD PRESSURE: 70 MMHG | RESPIRATION RATE: 16 BRPM

## 2020-09-23 DIAGNOSIS — I73.9 PERIPHERAL ARTERIAL DISEASE (HCC): ICD-10-CM

## 2020-09-23 DIAGNOSIS — R60.0 BILATERAL LEG EDEMA: ICD-10-CM

## 2020-09-23 DIAGNOSIS — E11.40 TYPE 2 DIABETES MELLITUS WITH DIABETIC NEUROPATHY, WITHOUT LONG-TERM CURRENT USE OF INSULIN (HCC): ICD-10-CM

## 2020-09-23 DIAGNOSIS — M86.9 OSTEOMYELITIS OF LEFT FOOT, UNSPECIFIED TYPE (HCC): ICD-10-CM

## 2020-09-23 DIAGNOSIS — I10 HTN (HYPERTENSION): ICD-10-CM

## 2020-09-23 DIAGNOSIS — N17.9 ACUTE RENAL FAILURE, UNSPECIFIED ACUTE RENAL FAILURE TYPE (HCC): Primary | ICD-10-CM

## 2020-09-23 DIAGNOSIS — N18.30 STAGE 3 CHRONIC KIDNEY DISEASE (HCC): ICD-10-CM

## 2020-09-23 DIAGNOSIS — I15.0 RENOVASCULAR HYPERTENSION: ICD-10-CM

## 2020-09-23 DIAGNOSIS — I50.32 CHRONIC DIASTOLIC (CONGESTIVE) HEART FAILURE (HCC): ICD-10-CM

## 2020-09-23 DIAGNOSIS — I70.1 LEFT RENAL ARTERY STENOSIS (HCC): ICD-10-CM

## 2020-09-23 PROCEDURE — 99214 OFFICE O/P EST MOD 30 MIN: CPT | Performed by: NURSE PRACTITIONER

## 2020-09-23 RX ORDER — TORSEMIDE 20 MG/1
20 TABLET ORAL DAILY
Qty: 60 TABLET | Refills: 3
Start: 2020-09-23 | End: 2020-11-22 | Stop reason: SDUPTHER

## 2020-09-23 RX ORDER — METOPROLOL TARTRATE 50 MG/1
50 TABLET, FILM COATED ORAL EVERY 12 HOURS
Qty: 60 TABLET | Refills: 3
Start: 2020-09-23 | End: 2020-10-06

## 2020-09-23 NOTE — PATIENT INSTRUCTIONS
All questions asked and answered  The patient has been instructed to call office at [phone] with any questions or concerns      The patient has been instructed to obtain prescribed blood work and urine studies prior to next appointment  Avoid NSAID products to include Motrin, Ibuprofen, Aleve, Naprosyn, or naproxen   Continue to monitor BP at home and bring in BP log with next visits  Get BMP on 10/2/2020  Low sodium diet

## 2020-09-24 ENCOUNTER — TELEPHONE (OUTPATIENT)
Dept: NEPHROLOGY | Facility: CLINIC | Age: 65
End: 2020-09-24

## 2020-09-24 ENCOUNTER — TELEPHONE (OUTPATIENT)
Dept: NEPHROLOGY | Facility: HOSPITAL | Age: 65
End: 2020-09-24

## 2020-09-24 NOTE — TELEPHONE ENCOUNTER
----- Message from Zeus Cobos, Michaela Bundy sent at 9/23/2020  4:38 PM EDT -----  Hi! Patient seen in office today  Please call patient and have him increase metoloprol to 50 mg twice daily for bp control  Continue with amlodipine 10 mg daily and lasix 20 mg daily    Thanks  Todd Pinto

## 2020-09-24 NOTE — TELEPHONE ENCOUNTER
Spoke with patient this am and instructed him to increase Metoprolol to 50 mg twice daily for BP controll  Also instructed to monitor BP at home and bring in log with next office visit on 10/6/2020 with Dr Romana Pradhan  patient agreed verbally

## 2020-09-29 ENCOUNTER — OFFICE VISIT (OUTPATIENT)
Dept: ENDOCRINOLOGY | Facility: CLINIC | Age: 65
End: 2020-09-29
Payer: MEDICARE

## 2020-09-29 VITALS
HEIGHT: 70 IN | TEMPERATURE: 97.6 F | SYSTOLIC BLOOD PRESSURE: 148 MMHG | DIASTOLIC BLOOD PRESSURE: 62 MMHG | BODY MASS INDEX: 30.24 KG/M2 | HEART RATE: 85 BPM | WEIGHT: 211.2 LBS

## 2020-09-29 DIAGNOSIS — E11.22 TYPE 2 DIABETES MELLITUS WITH STAGE 3 CHRONIC KIDNEY DISEASE, WITH LONG-TERM CURRENT USE OF INSULIN (HCC): Primary | ICD-10-CM

## 2020-09-29 DIAGNOSIS — E11.22 TYPE 2 DIABETES MELLITUS WITH STAGE 3 CHRONIC KIDNEY DISEASE, WITH LONG-TERM CURRENT USE OF INSULIN (HCC): ICD-10-CM

## 2020-09-29 DIAGNOSIS — I10 ESSENTIAL HYPERTENSION: ICD-10-CM

## 2020-09-29 DIAGNOSIS — I10 ESSENTIAL (PRIMARY) HYPERTENSION: ICD-10-CM

## 2020-09-29 DIAGNOSIS — Z79.4 TYPE 2 DIABETES MELLITUS WITH STAGE 3 CHRONIC KIDNEY DISEASE, WITH LONG-TERM CURRENT USE OF INSULIN (HCC): ICD-10-CM

## 2020-09-29 DIAGNOSIS — E78.2 MIXED HYPERLIPIDEMIA: ICD-10-CM

## 2020-09-29 DIAGNOSIS — Z79.4 TYPE 2 DIABETES MELLITUS WITH STAGE 3 CHRONIC KIDNEY DISEASE, WITH LONG-TERM CURRENT USE OF INSULIN (HCC): Primary | ICD-10-CM

## 2020-09-29 DIAGNOSIS — N18.30 TYPE 2 DIABETES MELLITUS WITH STAGE 3 CHRONIC KIDNEY DISEASE, WITH LONG-TERM CURRENT USE OF INSULIN (HCC): Primary | ICD-10-CM

## 2020-09-29 DIAGNOSIS — N18.30 TYPE 2 DIABETES MELLITUS WITH STAGE 3 CHRONIC KIDNEY DISEASE, WITH LONG-TERM CURRENT USE OF INSULIN (HCC): ICD-10-CM

## 2020-09-29 PROCEDURE — 99214 OFFICE O/P EST MOD 30 MIN: CPT | Performed by: NURSE PRACTITIONER

## 2020-09-29 RX ORDER — AMLODIPINE BESYLATE 10 MG/1
10 TABLET ORAL DAILY
Qty: 90 TABLET | Refills: 3 | Status: SHIPPED | OUTPATIENT
Start: 2020-09-29 | End: 2021-09-30 | Stop reason: SDUPTHER

## 2020-09-29 RX ORDER — GLUCAGON 3 MG/1
POWDER NASAL
Qty: 1 EACH | Refills: 2 | Status: SHIPPED | OUTPATIENT
Start: 2020-09-29 | End: 2021-06-01

## 2020-09-29 RX ORDER — DUPILUMAB 300 MG/2ML
300 INJECTION, SOLUTION SUBCUTANEOUS
COMMUNITY

## 2020-09-29 NOTE — TELEPHONE ENCOUNTER
Please call Jessie regarding coverage for patient   His wife can not administer an IM injection in the event of severe hypoglycemia for patient

## 2020-09-29 NOTE — ASSESSMENT & PLAN NOTE
Well controlled with no episodes of hypoglycemia  Continue current regimen  Focus on dietary and lifestyle modifications

## 2020-09-29 NOTE — PROGRESS NOTES
Established Patient Progress Note      Chief Complaint   Patient presents with    Diabetes Type 2          History of Present Illness:   Hilda Trujillo is a 77 y o  male with a history of HTN, HLD type 2 diabetes without long term use of insulin  Reports complications of neuropathy and CKD  Last A1C 5 8  He did not bring in BG log or meter to today's visit  Denies recent illness or hospitalizations  Denies recent severe hypoglycemic or severe hyperglycemic episodes  Denies any issues with his current regimen  Home glucose monitoring: are performed regularly    Home blood glucose readings:   Before breakfast: 130s  Before lunch: 130s  Before dinner: does not check   Bedtime: 130-140s    Current regimen: Xultophy 19 units and Metformin 500 mg BID   compliant all of the timedenies any side effects from current medications     Hypoglycemic episodes: No never   H/o of hypoglycemia causing hospitalization or Intervention such as glucagon injection or ambulance call No     Last Eye Exam: 3/13/20  Last Foot Exam: 6/12/20    Has hypertension: Taking Amlodipine, Losartan, and Metoprolol   Has hyperlipidemia: Taking Simvastatin and Lovaza          Patient Active Problem List   Diagnosis    Bilateral leg edema    Diabetes mellitus with neuropathy ([address] )    Diabetic foot ulcer [facility] [facility]    Easy bruising    Eczema    Erectile dysfunction of non-organic origin    Gout    Hyperlipidemia    Uremia    Osteoarthritis    Peripheral arterial disease (HCC)    PVCs (premature ventricular contractions)    Rhinitis    Systolic murmur    Vertigo    Complete heart block (HCC)    Metabolic acidosis    Other hyperlipidemia    Severe sepsis (HCC)    PAF (paroxysmal atrial fibrillation) (HCC)    Acute respiratory failure with hypoxia (HCC)    Persistent proteinuria    Volume overload    Urinary retention    NICOLASA (obstructive sleep apnea)    Acute diastolic (congestive) heart failure (HCC)    Type 2 diabetes mellitus with chronic kidney disease, with long-term current use of insulin ([facility])    Essential hypertension    Stage 4 chronic kidney disease ([facility])    Nonrheumatic aortic valve stenosis    Hypoglycemia    Anemia    Fever    Mitral valve stenosis    Abnormal CT of the chest    Acute pulmonary edema ([facility])    Chronic diastolic (congestive) heart failure ([facility])    Left renal artery stenosis (HCC)    S/P amputation of lesser toe, left (HCC)    S/P amputation of lesser toe, right (HCC)    Elevated troponin I level    Staphylococcus aureus bacteremia    Type 2 diabetes mellitus with diabetic neuropathy, without long-term current use of insulin ([facility])    Iron deficiency anemia    Anxiety    Osteomyelitis of left foot (HCC)    Amputation of left great toe ([facility])    Multiple drug resistant organism (MDRO) culture positive    Diabetic kidney disease [facility]    Diabetes mellitus ([facility]    Renovascular hypertension    Acute renal failure (ARF) ([facility])    Stage 3 chronic kidney disease ([facility])      Past Medical History:   Diagnosis Date    Arthritis     OA    Bruise of both arms     forearms and both hands    Bruises easily     CHF (congestive heart failure) ([facility]     Diabetes mellitus ([facility]     Diabetic foot ulcer ([facility]     Eczema     Erectile dysfunction     Gout     Hyperlipidemia     Hypertension     Murmur     Nephropathy     Osteoarthritis     PAD (peripheral artery disease) ([facility])     Seasonal allergies     Toe infection     bilat great toes    Vertigo     Walks frequently     Wears dentures     upper    Wears glasses       Past Surgical History:   Procedure Laterality Date    CARDIAC PACEMAKER PLACEMENT      CARPAL TUNNEL RELEASE Left     CARPAL TUNNEL RELEASE Right     CARPAL TUNNEL RELEASE      COLONOSCOPY  08/2020    FOOT AMPUTATION Left 2/10/2020    Procedure: PARTIAL 1ST RAY AMPUTATION;  Surgeon: Ana Cristina Christianson DPM;  [facility];  Service: Podiatry    INCISION AND DRAINAGE OF WOUND Left 2020    Procedure: INCISION AND DRAINAGE (I&D) EXTREMITY AND REMOVAL OF SESMOID BONE;  Surgeon: Rachid López DPM;  [facility];  Service: Podiatry    IR PICC LINE REPOSITION  2020    KNEE ARTHROSCOPY Left     KNEE ARTHROSCOPY Right     KNEE SURGERY      DC AMPUTATION TOE,I-P JT Bilateral 2017    Procedure: PARTIAL AMPUTATION RIGHT AND LEFT HALLUX ;  Surgeon: Charity Garay DPM;  [facility];  Service: Podiatry    DC AMPUTATION TOE,MT-P JT Left 2017    Procedure: 2ND TOE AMPUTATION;  Surgeon: Charity Garay DPM;  [facility];  Service: Podiatry    SHOULDER ARTHROSCOPY Left     with screws,RTC    SHOULDER SURGERY      TOE AMPUTATION Left 2020    Procedure: Georgina Nash;  Surgeon: Rachid López DPM;  [facility];  Service: Podiatry    UPPER GASTROINTESTINAL ENDOSCOPY  2020    Intestinal metaplasia prepyloric    VASECTOMY        Family History   Problem Relation Age of Onset    Heart disease Father     No Known Problems Mother     Hypertension Father     Pulmonary embolism Father      Social History     Tobacco Use    Smoking status: Former Smoker     Packs/day: 1 00     Years: 30 00     Pack years: 30 00     Types: Cigarettes     Last attempt to quit: 2009     Years since quittin 7    Smokeless tobacco: Never Used    Tobacco comment: quit 10 years ago   Substance Use Topics    Alcohol use: Never     Frequency: Never     Drinks per session: Patient refused     Binge frequency: Never     Allergies   Allergen Reactions    Invokana [Canagliflozin]     Lamisil [Terbinafine] Rash    Lamisil [Terbinafine] Blisters     Wife states " His skin peeled from head to toe"    Other Swelling     Pomegranate - facial swelling, no swelling of tongue, esophagus  Adhesive tape        Latex Rash         Current Outpatient Medications:     allopurinol (ZYLOPRIM) 300 mg tablet, Take 1 tablet (300 mg total) by mouth every morning, Disp: 90 tablet, Rfl: 2    amLODIPine (NORVASC) 10 mg tablet, Take 1 tablet (10 mg total) by mouth daily, Disp: 90 tablet, Rfl: 3    apixaban (ELIQUIS) 5 mg, Take 1 tablet (5 mg total) by mouth every 12 (twelve) hours, Disp: 60 tablet, Rfl: 11    Cholecalciferol (VITAMIN D-3) 1000 units CAPS, Take 1 capsule by mouth every morning  , Disp: , Rfl:     Dupilumab (Dupixent) 300 MG/2ML SOPN, Inject 300 mg under the skin, Disp: , Rfl:     famotidine (PEPCID) 40 MG tablet, Take 1 tablet (40 mg total) by mouth daily, Disp: 90 tablet, Rfl: 3    glucose blood (OneTouch Ultra) test strip, USE TO TEST BLOOD SUGAR 3 TIMES A DAY, Disp: 100 each, Rfl: 3    Insulin Degludec-Liraglutide (Insulin Degludec-Liraglutide) 100 units-3 6 mg/mL injection pen, INJECT 16 UNITS UNDER THE SKIN DAILY FOR 30 DAYS (Patient taking differently: 19 Units ), Disp: 15 pen, Rfl: 6    Insulin Pen Needle (BD Pen Needle Zenaida U/F) 32G X 4 MM MISC, Use 1 daily, Disp: 200 each, Rfl: 3    metFORMIN (GLUCOPHAGE) 500 mg tablet, Take 1 tablet (500 mg total) by mouth 2 (two) times a day with meals, Disp: 180 tablet, Rfl: 3    metolazone (ZAROXOLYN) 5 mg tablet, Take 1 tablet (5 mg total) by mouth see administration instructions Take every 6 days prn as directed by physician for wt gain/edema, Disp: 5 tablet, Rfl: 5    metoprolol tartrate (LOPRESSOR) 50 mg tablet, Take 1 tablet (50 mg total) by mouth every 12 (twelve) hours, Disp: 60 tablet, Rfl: 3    Multiple Vitamin (MULTIVITAMIN) tablet, Take 1 tablet by mouth daily IN AM, Disp: , Rfl:     omega-3-acid ethyl esters (LOVAZA) 1 g capsule, Take 4 capsules (4 g total) by mouth 2 (two) times a day, Disp: 360 capsule, Rfl: 1    simvastatin (ZOCOR) 20 mg tablet, Take 1 tablet (20 mg total) by mouth daily at bedtime, Disp: 90 tablet, Rfl: 3    sodium bicarbonate 650 mg tablet, TAKE 1 TABLET (650 MG TOTAL) BY MOUTH 2 (TWO) TIMES DAILY AFTER MEALS, Disp: 60 tablet, Rfl: 2    tamsulosin (FLOMAX) 0 4 mg, Take 1 capsule (0 4 mg total) by mouth daily with dinner, Disp: 90 capsule, Rfl: 3    torsemide (DEMADEX) 20 mg tablet, Take 1 tablet (20 mg total) by mouth daily, Disp: 60 tablet, Rfl: 3    betamethasone valerate (VALISONE) 0 1 % cream, as needed , Disp: , Rfl: 3    ferrous gluconate (FERGON) 324 mg tablet, TAKE 1 TABLET BY MOUTH DAILY WITH BREAKFAST (Patient not taking: Reported on 9/29/2020), Disp: 30 tablet, Rfl: 0    Glucagon (Baqsimi Two Pack) 3 MG/DOSE POWD, One inhalation to one nostril, Disp: 1 each, Rfl: 2    polyethylene glycol-electrolytes (TriLyte) 4000 mL solution, Take 4,000 mL by mouth once for 1 dose, Disp: 4000 mL, Rfl: 0    Review of Systems   Constitutional: Negative for activity change, appetite change and fatigue  HENT: Negative for sore throat, trouble swallowing and voice change  Eyes: Negative for visual disturbance  Respiratory: Negative for choking, chest tightness and shortness of breath  Cardiovascular: Negative for chest pain, palpitations and leg swelling  Gastrointestinal: Negative for abdominal pain, constipation and diarrhea  Endocrine: Negative for cold intolerance, heat intolerance, polydipsia, polyphagia and polyuria  Genitourinary: Negative for frequency  Musculoskeletal: Negative for arthralgias and myalgias  Skin: Negative for rash  Neurological: Negative for dizziness and syncope  Hematological: Negative for adenopathy  Psychiatric/Behavioral: Negative for sleep disturbance  All other systems reviewed and are negative  Physical Exam:  Body mass index is 30 3 kg/m²  /62   Pulse 85   Temp 97 6 °F (36 4 °C)   Ht 5' 10" (1 778 m)   Wt 95 8 kg (211 lb 3 2 oz)   BMI 30 30 kg/m²    Wt Readings from Last 3 Encounters:   09/29/20 95 8 kg (211 lb 3 2 oz)   09/23/20 93 kg (205 lb)   08/25/20 92 5 kg (204 lb)       Physical Exam  Vitals signs reviewed  Constitutional:       General: He is not in acute distress  Appearance: He is well-developed     HENT: Head: Normocephalic and atraumatic  Eyes:      Conjunctiva/sclera: Conjunctivae normal       Pupils: Pupils are equal, round, and reactive to light  Neck:      Musculoskeletal: Normal range of motion and neck supple  Thyroid: No thyromegaly  Cardiovascular:      Rate and Rhythm: Normal rate and regular rhythm  Heart sounds: Normal heart sounds  No murmur  Pulmonary:      Effort: Pulmonary effort is normal  No respiratory distress  Breath sounds: Normal breath sounds  No wheezing or rales  Abdominal:      General: Bowel sounds are normal  There is no distension  Palpations: Abdomen is soft  Tenderness: There is no abdominal tenderness  Musculoskeletal: Normal range of motion  Lymphadenopathy:      Cervical: No cervical adenopathy  Skin:     General: Skin is warm and dry  Neurological:      Mental Status: He is alert and oriented to person, place, and time             Labs:     Lab Results   Component Value Date    HGBA1C 5 8 (H) 09/03/2020       Lab Results   Component Value Date     (L) 04/10/2017    SODIUM 138 09/18/2020    K 5 2 09/18/2020     09/18/2020    CO2 23 09/18/2020    AGAP 7 09/18/2020    BUN 41 (H) 09/18/2020    CREATININE 2 94 (H) 09/18/2020    GLUC 69 03/29/2020    GLUF 134 (H) 09/18/2020    CALCIUM 9 5 09/18/2020    AST 19 09/03/2020    ALT 21 09/03/2020    ALKPHOS 78 09/03/2020    PROT 6 9 04/10/2017    TP 7 2 09/03/2020    BILITOT 0 7 04/10/2017    TBILI 0 57 09/03/2020    EGFR 21 09/18/2020         Lab Results   Component Value Date    CHOL 161 12/12/2015    HDL 32 (L) 09/03/2020    TRIG 137 09/03/2020       Lab Results   Component Value Date    LAI5BYOGCPJU 0 504 01/19/2020    JAB0EFQSFCHV 3 430 09/19/2019    TDD8ATGEMLHU 4 170 (H) 06/12/2019     Lab Results   Component Value Date    FREET4 0 88 09/19/2019       Impression & Plan:    Problem List Items Addressed This Visit        Endocrine    Type 2 diabetes mellitus with chronic kidney disease, with long-term current use of insulin ([facility] [address] ) - Primary     Well controlled with no episodes of hypoglycemia  Continue current regimen  Focus on dietary and lifestyle modifications  Relevant Medications    Glucagon (Baqsimi Two Pack) 3 MG/DOSE POWD    Other Relevant Orders    Hemoglobin A1C    Comprehensive metabolic panel    Lipid Panel with Direct LDL reflex    Microalbumin / creatinine urine ratio       Cardiovascular and Mediastinum    Essential hypertension     Continue current regimen          Relevant Medications    amLODIPine (NORVASC) 10 mg tablet    Other Relevant Orders    Comprehensive metabolic panel       Other    Hyperlipidemia     Continue current regimen          Relevant Orders    Lipid Panel with Direct LDL reflex      Other Visit Diagnoses     Essential (primary) hypertension        Relevant Medications    amLODIPine (NORVASC) 10 mg tablet          Orders Placed This Encounter   Procedures    Hemoglobin A1C     Standing Status:   Future     Standing Expiration Date:   9/29/2021    Comprehensive metabolic panel     This is a patient instruction: Patient fasting for 8 hours or longer recommended  Standing Status:   Future     Standing Expiration Date:   9/29/2021    Lipid Panel with Direct LDL reflex     This is a patient instruction: This test requires patient fasting for 10-12 hours or longer  Drinking of black coffee or black tea is acceptable  Standing Status:   Future     Standing Expiration Date:   9/29/2021    Microalbumin / creatinine urine ratio     Standing Status:   Future     Standing Expiration Date:   9/29/2021         Discussed with the patient and all questioned fully answered  He will call me if any problems arise  Follow-up appointment in 4 months       Counseled patient on diagnostic results, prognosis, risk and benefit of treatment options, instruction for management, importance of treatment compliance, Risk  factor reduction and impressions      Chleo Deluca [address]

## 2020-09-30 DIAGNOSIS — M86.9 OSTEOMYELITIS OF LEFT FOOT, UNSPECIFIED TYPE (HCC): ICD-10-CM

## 2020-09-30 RX ORDER — SODIUM BICARBONATE 650 MG/1
650 TABLET ORAL
Qty: 180 TABLET | Refills: 0 | Status: SHIPPED | OUTPATIENT
Start: 2020-09-30 | End: 2020-11-22 | Stop reason: SDUPTHER

## 2020-10-02 ENCOUNTER — LAB (OUTPATIENT)
Dept: LAB | Facility: CLINIC | Age: 65
End: 2020-10-02
Payer: MEDICARE

## 2020-10-02 DIAGNOSIS — N17.9 ACUTE RENAL FAILURE, UNSPECIFIED ACUTE RENAL FAILURE TYPE (HCC): ICD-10-CM

## 2020-10-02 DIAGNOSIS — I48.0 PAROXYSMAL ATRIAL FIBRILLATION (HCC): ICD-10-CM

## 2020-10-06 ENCOUNTER — OFFICE VISIT (OUTPATIENT)
Dept: NEPHROLOGY | Facility: CLINIC | Age: 65
End: 2020-10-06
Payer: MEDICARE

## 2020-10-06 VITALS
TEMPERATURE: 97.4 F | HEIGHT: 70 IN | BODY MASS INDEX: 30.21 KG/M2 | WEIGHT: 211 LBS | SYSTOLIC BLOOD PRESSURE: 152 MMHG | DIASTOLIC BLOOD PRESSURE: 64 MMHG

## 2020-10-06 DIAGNOSIS — M86.9 OSTEOMYELITIS OF LEFT FOOT, UNSPECIFIED TYPE (HCC): ICD-10-CM

## 2020-10-06 DIAGNOSIS — N18.4 CKD (CHRONIC KIDNEY DISEASE) STAGE 4, GFR 15-29 ML/MIN (HCC): Primary | ICD-10-CM

## 2020-10-06 PROCEDURE — 99214 OFFICE O/P EST MOD 30 MIN: CPT | Performed by: INTERNAL MEDICINE

## 2020-10-06 RX ORDER — METOPROLOL TARTRATE 50 MG/1
50 TABLET, FILM COATED ORAL EVERY 12 HOURS
Qty: 180 TABLET | Refills: 3
Start: 2020-10-06 | End: 2020-10-12 | Stop reason: SDUPTHER

## 2020-10-12 DIAGNOSIS — M86.9 OSTEOMYELITIS OF LEFT FOOT, UNSPECIFIED TYPE (HCC): ICD-10-CM

## 2020-10-12 RX ORDER — METOPROLOL TARTRATE 50 MG/1
50 TABLET, FILM COATED ORAL EVERY 12 HOURS SCHEDULED
Qty: 180 TABLET | Refills: 3
Start: 2020-10-12 | End: 2020-11-22 | Stop reason: SDUPTHER

## 2020-10-16 ENCOUNTER — CLINICAL SUPPORT (OUTPATIENT)
Dept: FAMILY MEDICINE CLINIC | Facility: CLINIC | Age: 65
End: 2020-10-16
Payer: MEDICARE

## 2020-10-16 VITALS — TEMPERATURE: 97.4 F

## 2020-10-16 DIAGNOSIS — Z23 NEED FOR PNEUMOCOCCAL VACCINATION: Primary | ICD-10-CM

## 2020-10-16 PROCEDURE — 90670 PCV13 VACCINE IM: CPT

## 2020-10-16 PROCEDURE — G0009 ADMIN PNEUMOCOCCAL VACCINE: HCPCS

## 2020-10-29 ENCOUNTER — LAB (OUTPATIENT)
Dept: LAB | Facility: CLINIC | Age: 65
End: 2020-10-29
Payer: MEDICARE

## 2020-10-29 DIAGNOSIS — M86.9 OSTEOMYELITIS OF LEFT FOOT, UNSPECIFIED TYPE (HCC): ICD-10-CM

## 2020-10-29 DIAGNOSIS — N18.4 CKD (CHRONIC KIDNEY DISEASE) STAGE 4, GFR 15-29 ML/MIN (HCC): ICD-10-CM

## 2020-10-29 LAB
ALBUMIN SERPL BCP-MCNC: 3.5 G/DL (ref 3.5–5)
ANION GAP SERPL CALCULATED.3IONS-SCNC: 4 MMOL/L (ref 4–13)
BUN SERPL-MCNC: 40 MG/DL (ref 5–25)
CALCIUM SERPL-MCNC: 9.2 MG/DL (ref 8.3–10.1)
CHLORIDE SERPL-SCNC: 110 MMOL/L (ref 100–108)
CO2 SERPL-SCNC: 24 MMOL/L (ref 21–32)
CREAT SERPL-MCNC: 2.84 MG/DL (ref 0.6–1.3)
GFR SERPL CREATININE-BSD FRML MDRD: 22 ML/MIN/1.73SQ M
GLUCOSE P FAST SERPL-MCNC: 94 MG/DL (ref 65–99)
PHOSPHATE SERPL-MCNC: 4.4 MG/DL (ref 2.3–4.1)
POTASSIUM SERPL-SCNC: 4.5 MMOL/L (ref 3.5–5.3)
SODIUM SERPL-SCNC: 138 MMOL/L (ref 136–145)

## 2020-10-29 PROCEDURE — 36415 COLL VENOUS BLD VENIPUNCTURE: CPT | Performed by: INTERNAL MEDICINE

## 2020-10-29 PROCEDURE — 80069 RENAL FUNCTION PANEL: CPT | Performed by: INTERNAL MEDICINE

## 2020-10-30 ENCOUNTER — TELEPHONE (OUTPATIENT)
Dept: NEPHROLOGY | Facility: HOSPITAL | Age: 65
End: 2020-10-30

## 2020-11-22 DIAGNOSIS — E11.8 TYPE 2 DIABETES MELLITUS WITH COMPLICATION (HCC): ICD-10-CM

## 2020-11-22 DIAGNOSIS — M10.9 GOUT, UNSPECIFIED CAUSE, UNSPECIFIED CHRONICITY, UNSPECIFIED SITE: ICD-10-CM

## 2020-11-22 DIAGNOSIS — M86.9 OSTEOMYELITIS OF LEFT FOOT, UNSPECIFIED TYPE (HCC): ICD-10-CM

## 2020-11-22 DIAGNOSIS — I10 HTN (HYPERTENSION): ICD-10-CM

## 2020-11-23 DIAGNOSIS — E11.22 TYPE 2 DIABETES MELLITUS WITH STAGE 3 CHRONIC KIDNEY DISEASE, WITHOUT LONG-TERM CURRENT USE OF INSULIN (HCC): ICD-10-CM

## 2020-11-23 DIAGNOSIS — N18.30 TYPE 2 DIABETES MELLITUS WITH STAGE 3 CHRONIC KIDNEY DISEASE, WITHOUT LONG-TERM CURRENT USE OF INSULIN (HCC): ICD-10-CM

## 2020-11-23 PROBLEM — I12.9 HYPERTENSION WITH RENAL DISEASE: Status: ACTIVE | Noted: 2018-11-14

## 2020-11-23 PROBLEM — I49.5 SSS (SICK SINUS SYNDROME) (HCC): Status: ACTIVE | Noted: 2020-11-23

## 2020-11-23 RX ORDER — OMEGA-3-ACID ETHYL ESTERS 1 G/1
2 CAPSULE, LIQUID FILLED ORAL 2 TIMES DAILY
Qty: 360 CAPSULE | Refills: 3 | Status: SHIPPED | OUTPATIENT
Start: 2020-11-23 | End: 2021-12-10 | Stop reason: SDUPTHER

## 2020-11-23 RX ORDER — SODIUM BICARBONATE 650 MG/1
650 TABLET ORAL
Qty: 180 TABLET | Refills: 1 | Status: SHIPPED | OUTPATIENT
Start: 2020-11-23 | End: 2021-05-30

## 2020-11-23 RX ORDER — TORSEMIDE 20 MG/1
20 TABLET ORAL DAILY
Qty: 60 TABLET | Refills: 0
Start: 2020-11-23 | End: 2020-11-24 | Stop reason: SDUPTHER

## 2020-11-23 RX ORDER — METOPROLOL TARTRATE 50 MG/1
50 TABLET, FILM COATED ORAL EVERY 12 HOURS SCHEDULED
Qty: 180 TABLET | Refills: 0
Start: 2020-11-23 | End: 2020-12-01 | Stop reason: SDUPTHER

## 2020-11-23 RX ORDER — ALLOPURINOL 300 MG/1
300 TABLET ORAL EVERY MORNING
Qty: 90 TABLET | Refills: 1 | Status: SHIPPED | OUTPATIENT
Start: 2020-11-23 | End: 2021-05-21

## 2020-11-24 ENCOUNTER — OFFICE VISIT (OUTPATIENT)
Dept: CARDIOLOGY CLINIC | Facility: CLINIC | Age: 65
End: 2020-11-24
Payer: MEDICARE

## 2020-11-24 VITALS
HEIGHT: 70 IN | BODY MASS INDEX: 29.69 KG/M2 | OXYGEN SATURATION: 93 % | DIASTOLIC BLOOD PRESSURE: 60 MMHG | TEMPERATURE: 97.9 F | HEART RATE: 67 BPM | WEIGHT: 207.4 LBS | SYSTOLIC BLOOD PRESSURE: 128 MMHG

## 2020-11-24 DIAGNOSIS — I35.0 NONRHEUMATIC AORTIC VALVE STENOSIS: ICD-10-CM

## 2020-11-24 DIAGNOSIS — I49.5 SSS (SICK SINUS SYNDROME) (HCC): ICD-10-CM

## 2020-11-24 DIAGNOSIS — E78.2 MIXED HYPERLIPIDEMIA: ICD-10-CM

## 2020-11-24 DIAGNOSIS — I12.9 HYPERTENSION WITH RENAL DISEASE: ICD-10-CM

## 2020-11-24 DIAGNOSIS — I48.0 PAF (PAROXYSMAL ATRIAL FIBRILLATION) (HCC): ICD-10-CM

## 2020-11-24 DIAGNOSIS — I34.2 NONRHEUMATIC MITRAL VALVE STENOSIS: ICD-10-CM

## 2020-11-24 DIAGNOSIS — I50.32 CHRONIC DIASTOLIC (CONGESTIVE) HEART FAILURE (HCC): Primary | ICD-10-CM

## 2020-11-24 PROCEDURE — 99214 OFFICE O/P EST MOD 30 MIN: CPT | Performed by: INTERNAL MEDICINE

## 2020-11-24 RX ORDER — TORSEMIDE 20 MG/1
20 TABLET ORAL 2 TIMES DAILY
Qty: 60 TABLET | Refills: 0
Start: 2020-11-24 | End: 2020-12-01 | Stop reason: SDUPTHER

## 2020-11-24 RX ORDER — SIMVASTATIN 20 MG
20 TABLET ORAL
Qty: 90 TABLET | Refills: 3 | Status: SHIPPED | OUTPATIENT
Start: 2020-11-24 | End: 2021-12-07 | Stop reason: SDUPTHER

## 2020-11-30 ENCOUNTER — LAB (OUTPATIENT)
Dept: LAB | Facility: CLINIC | Age: 65
End: 2020-11-30
Payer: MEDICARE

## 2020-11-30 LAB
BASOPHILS # BLD AUTO: 0.05 THOUSANDS/ΜL (ref 0–0.1)
BASOPHILS NFR BLD AUTO: 1 % (ref 0–1)
EOSINOPHIL # BLD AUTO: 0.67 THOUSAND/ΜL (ref 0–0.61)
EOSINOPHIL NFR BLD AUTO: 7 % (ref 0–6)
ERYTHROCYTE [DISTWIDTH] IN BLOOD BY AUTOMATED COUNT: 14.3 % (ref 11.6–15.1)
HCT VFR BLD AUTO: 27 % (ref 36.5–49.3)
HGB BLD-MCNC: 8.5 G/DL (ref 12–17)
IMM GRANULOCYTES # BLD AUTO: 0.03 THOUSAND/UL (ref 0–0.2)
IMM GRANULOCYTES NFR BLD AUTO: 0 % (ref 0–2)
LYMPHOCYTES # BLD AUTO: 1.25 THOUSANDS/ΜL (ref 0.6–4.47)
LYMPHOCYTES NFR BLD AUTO: 13 % (ref 14–44)
MAGNESIUM SERPL-MCNC: 2.5 MG/DL (ref 1.6–2.6)
MCH RBC QN AUTO: 28.8 PG (ref 26.8–34.3)
MCHC RBC AUTO-ENTMCNC: 31.5 G/DL (ref 31.4–37.4)
MCV RBC AUTO: 92 FL (ref 82–98)
MONOCYTES # BLD AUTO: 0.75 THOUSAND/ΜL (ref 0.17–1.22)
MONOCYTES NFR BLD AUTO: 8 % (ref 4–12)
NEUTROPHILS # BLD AUTO: 7.18 THOUSANDS/ΜL (ref 1.85–7.62)
NEUTS SEG NFR BLD AUTO: 71 % (ref 43–75)
NRBC BLD AUTO-RTO: 0 /100 WBCS
PLATELET # BLD AUTO: 338 THOUSANDS/UL (ref 149–390)
PMV BLD AUTO: 9.9 FL (ref 8.9–12.7)
PTH-INTACT SERPL-MCNC: 128.3 PG/ML (ref 18.4–80.1)
RBC # BLD AUTO: 2.95 MILLION/UL (ref 3.88–5.62)
WBC # BLD AUTO: 9.93 THOUSAND/UL (ref 4.31–10.16)

## 2020-11-30 PROCEDURE — 36415 COLL VENOUS BLD VENIPUNCTURE: CPT

## 2020-11-30 PROCEDURE — 83735 ASSAY OF MAGNESIUM: CPT

## 2020-11-30 PROCEDURE — 83970 ASSAY OF PARATHORMONE: CPT

## 2020-11-30 PROCEDURE — 85025 COMPLETE CBC W/AUTO DIFF WBC: CPT

## 2020-12-01 DIAGNOSIS — M86.9 OSTEOMYELITIS OF LEFT FOOT, UNSPECIFIED TYPE (HCC): ICD-10-CM

## 2020-12-01 DIAGNOSIS — I50.32 CHRONIC DIASTOLIC (CONGESTIVE) HEART FAILURE (HCC): ICD-10-CM

## 2020-12-01 RX ORDER — METOPROLOL TARTRATE 50 MG/1
50 TABLET, FILM COATED ORAL EVERY 12 HOURS SCHEDULED
Qty: 180 TABLET | Refills: 3
Start: 2020-12-01 | End: 2020-12-06

## 2020-12-01 RX ORDER — TORSEMIDE 20 MG/1
20 TABLET ORAL 2 TIMES DAILY
Qty: 60 TABLET | Refills: 3
Start: 2020-12-01 | End: 2020-12-07

## 2020-12-03 ENCOUNTER — OFFICE VISIT (OUTPATIENT)
Dept: FAMILY MEDICINE CLINIC | Facility: CLINIC | Age: 65
End: 2020-12-03
Payer: MEDICARE

## 2020-12-03 ENCOUNTER — TELEPHONE (OUTPATIENT)
Dept: NEPHROLOGY | Facility: CLINIC | Age: 65
End: 2020-12-03

## 2020-12-03 VITALS
SYSTOLIC BLOOD PRESSURE: 142 MMHG | HEIGHT: 70 IN | OXYGEN SATURATION: 94 % | RESPIRATION RATE: 16 BRPM | WEIGHT: 210 LBS | HEART RATE: 68 BPM | DIASTOLIC BLOOD PRESSURE: 64 MMHG | TEMPERATURE: 98 F | BODY MASS INDEX: 30.06 KG/M2

## 2020-12-03 DIAGNOSIS — N18.4 STAGE 4 CHRONIC KIDNEY DISEASE (HCC): ICD-10-CM

## 2020-12-03 DIAGNOSIS — J44.9 CHRONIC OBSTRUCTIVE PULMONARY DISEASE, UNSPECIFIED COPD TYPE (HCC): ICD-10-CM

## 2020-12-03 DIAGNOSIS — I50.32 CHRONIC DIASTOLIC (CONGESTIVE) HEART FAILURE (HCC): ICD-10-CM

## 2020-12-03 DIAGNOSIS — I12.9 HYPERTENSION WITH RENAL DISEASE: ICD-10-CM

## 2020-12-03 DIAGNOSIS — N18.4 ANEMIA DUE TO STAGE 4 CHRONIC KIDNEY DISEASE (HCC): Primary | ICD-10-CM

## 2020-12-03 DIAGNOSIS — E11.22 TYPE 2 DIABETES MELLITUS WITH STAGE 3 CHRONIC KIDNEY DISEASE, WITH LONG-TERM CURRENT USE OF INSULIN, UNSPECIFIED WHETHER STAGE 3A OR 3B CKD (HCC): Primary | ICD-10-CM

## 2020-12-03 DIAGNOSIS — Z79.4 TYPE 2 DIABETES MELLITUS WITH STAGE 3 CHRONIC KIDNEY DISEASE, WITH LONG-TERM CURRENT USE OF INSULIN, UNSPECIFIED WHETHER STAGE 3A OR 3B CKD (HCC): Primary | ICD-10-CM

## 2020-12-03 DIAGNOSIS — D63.1 ANEMIA DUE TO STAGE 4 CHRONIC KIDNEY DISEASE (HCC): Primary | ICD-10-CM

## 2020-12-03 DIAGNOSIS — E78.49 OTHER HYPERLIPIDEMIA: ICD-10-CM

## 2020-12-03 DIAGNOSIS — I48.0 PAF (PAROXYSMAL ATRIAL FIBRILLATION) (HCC): ICD-10-CM

## 2020-12-03 DIAGNOSIS — N18.30 TYPE 2 DIABETES MELLITUS WITH STAGE 3 CHRONIC KIDNEY DISEASE, WITH LONG-TERM CURRENT USE OF INSULIN, UNSPECIFIED WHETHER STAGE 3A OR 3B CKD (HCC): Primary | ICD-10-CM

## 2020-12-03 DIAGNOSIS — M15.9 PRIMARY OSTEOARTHRITIS INVOLVING MULTIPLE JOINTS: ICD-10-CM

## 2020-12-03 DIAGNOSIS — E11.40 TYPE 2 DIABETES MELLITUS WITH DIABETIC NEUROPATHY, WITHOUT LONG-TERM CURRENT USE OF INSULIN (HCC): ICD-10-CM

## 2020-12-03 LAB — SL AMB POCT HEMOGLOBIN AIC: 5.2 (ref ?–6.5)

## 2020-12-03 PROCEDURE — 99214 OFFICE O/P EST MOD 30 MIN: CPT | Performed by: FAMILY MEDICINE

## 2020-12-03 PROCEDURE — 83036 HEMOGLOBIN GLYCOSYLATED A1C: CPT | Performed by: FAMILY MEDICINE

## 2020-12-06 DIAGNOSIS — M86.9 OSTEOMYELITIS OF LEFT FOOT, UNSPECIFIED TYPE (HCC): ICD-10-CM

## 2020-12-06 RX ORDER — METOPROLOL TARTRATE 50 MG/1
TABLET, FILM COATED ORAL
Qty: 60 TABLET | Refills: 0 | Status: SHIPPED | OUTPATIENT
Start: 2020-12-06 | End: 2020-12-07 | Stop reason: SDUPTHER

## 2020-12-07 DIAGNOSIS — I50.32 CHRONIC DIASTOLIC (CONGESTIVE) HEART FAILURE (HCC): ICD-10-CM

## 2020-12-07 DIAGNOSIS — M86.9 OSTEOMYELITIS OF LEFT FOOT, UNSPECIFIED TYPE (HCC): ICD-10-CM

## 2020-12-07 RX ORDER — METOPROLOL TARTRATE 50 MG/1
50 TABLET, FILM COATED ORAL EVERY 12 HOURS
Qty: 180 TABLET | Refills: 3 | Status: SHIPPED | OUTPATIENT
Start: 2020-12-07 | End: 2021-05-04

## 2020-12-07 RX ORDER — TORSEMIDE 20 MG/1
20 TABLET ORAL 2 TIMES DAILY
Qty: 180 TABLET | Refills: 3
Start: 2020-12-07 | End: 2021-03-12 | Stop reason: SDUPTHER

## 2020-12-08 ENCOUNTER — TELEPHONE (OUTPATIENT)
Dept: ENDOCRINOLOGY | Facility: CLINIC | Age: 65
End: 2020-12-08

## 2020-12-11 ENCOUNTER — LAB (OUTPATIENT)
Dept: LAB | Facility: CLINIC | Age: 65
End: 2020-12-11
Payer: MEDICARE

## 2020-12-11 ENCOUNTER — PATIENT MESSAGE (OUTPATIENT)
Dept: NEPHROLOGY | Facility: CLINIC | Age: 65
End: 2020-12-11

## 2020-12-11 DIAGNOSIS — D63.1 ANEMIA DUE TO STAGE 4 CHRONIC KIDNEY DISEASE (HCC): ICD-10-CM

## 2020-12-11 DIAGNOSIS — N18.4 STAGE 4 CHRONIC KIDNEY DISEASE (HCC): ICD-10-CM

## 2020-12-11 DIAGNOSIS — N18.4 ANEMIA DUE TO STAGE 4 CHRONIC KIDNEY DISEASE (HCC): ICD-10-CM

## 2020-12-11 LAB
ANION GAP SERPL CALCULATED.3IONS-SCNC: 6 MMOL/L (ref 4–13)
BUN SERPL-MCNC: 60 MG/DL (ref 5–25)
CALCIUM SERPL-MCNC: 9.7 MG/DL (ref 8.3–10.1)
CHLORIDE SERPL-SCNC: 108 MMOL/L (ref 100–108)
CO2 SERPL-SCNC: 24 MMOL/L (ref 21–32)
CREAT SERPL-MCNC: 3.51 MG/DL (ref 0.6–1.3)
FERRITIN SERPL-MCNC: 21 NG/ML (ref 8–388)
GFR SERPL CREATININE-BSD FRML MDRD: 17 ML/MIN/1.73SQ M
GLUCOSE P FAST SERPL-MCNC: 117 MG/DL (ref 65–99)
IRON SATN MFR SERPL: 7 %
IRON SERPL-MCNC: 32 UG/DL (ref 65–175)
POTASSIUM SERPL-SCNC: 4.6 MMOL/L (ref 3.5–5.3)
SODIUM SERPL-SCNC: 138 MMOL/L (ref 136–145)
TIBC SERPL-MCNC: 447 UG/DL (ref 250–450)

## 2020-12-11 PROCEDURE — 36415 COLL VENOUS BLD VENIPUNCTURE: CPT

## 2020-12-11 PROCEDURE — 82728 ASSAY OF FERRITIN: CPT

## 2020-12-11 PROCEDURE — 80048 BASIC METABOLIC PNL TOTAL CA: CPT

## 2020-12-11 PROCEDURE — 83550 IRON BINDING TEST: CPT

## 2020-12-11 PROCEDURE — 83540 ASSAY OF IRON: CPT

## 2020-12-14 ENCOUNTER — TELEPHONE (OUTPATIENT)
Dept: NEPHROLOGY | Facility: CLINIC | Age: 65
End: 2020-12-14

## 2020-12-15 ENCOUNTER — OFFICE VISIT (OUTPATIENT)
Dept: NEPHROLOGY | Facility: CLINIC | Age: 65
End: 2020-12-15
Payer: MEDICARE

## 2020-12-15 ENCOUNTER — REMOTE DEVICE CLINIC VISIT (OUTPATIENT)
Dept: CARDIOLOGY CLINIC | Facility: CLINIC | Age: 65
End: 2020-12-15
Payer: MEDICARE

## 2020-12-15 ENCOUNTER — LAB (OUTPATIENT)
Dept: LAB | Facility: CLINIC | Age: 65
End: 2020-12-15
Payer: MEDICARE

## 2020-12-15 VITALS
HEART RATE: 71 BPM | DIASTOLIC BLOOD PRESSURE: 68 MMHG | HEIGHT: 70 IN | SYSTOLIC BLOOD PRESSURE: 142 MMHG | WEIGHT: 201.8 LBS | BODY MASS INDEX: 28.89 KG/M2

## 2020-12-15 DIAGNOSIS — N18.30 STAGE 3 CHRONIC KIDNEY DISEASE, UNSPECIFIED WHETHER STAGE 3A OR 3B CKD (HCC): ICD-10-CM

## 2020-12-15 DIAGNOSIS — N18.30 STAGE 3 CHRONIC KIDNEY DISEASE, UNSPECIFIED WHETHER STAGE 3A OR 3B CKD (HCC): Primary | ICD-10-CM

## 2020-12-15 DIAGNOSIS — E11.21 DIABETIC NEPHROPATHY ASSOCIATED WITH TYPE 2 DIABETES MELLITUS (HCC): ICD-10-CM

## 2020-12-15 DIAGNOSIS — I12.9 HYPERTENSION WITH RENAL DISEASE: ICD-10-CM

## 2020-12-15 DIAGNOSIS — E87.2 METABOLIC ACIDOSIS: ICD-10-CM

## 2020-12-15 DIAGNOSIS — I15.0 RENOVASCULAR HYPERTENSION: ICD-10-CM

## 2020-12-15 DIAGNOSIS — Z95.0 PRESENCE OF PERMANENT CARDIAC PACEMAKER: Primary | ICD-10-CM

## 2020-12-15 DIAGNOSIS — R60.0 BILATERAL LEG EDEMA: ICD-10-CM

## 2020-12-15 DIAGNOSIS — N18.4 CHRONIC KIDNEY DISEASE (CKD), STAGE IV (SEVERE) (HCC): Primary | ICD-10-CM

## 2020-12-15 DIAGNOSIS — N18.4 STAGE 4 CHRONIC KIDNEY DISEASE (HCC): ICD-10-CM

## 2020-12-15 LAB
ANION GAP SERPL CALCULATED.3IONS-SCNC: 7 MMOL/L (ref 4–13)
BUN SERPL-MCNC: 49 MG/DL (ref 5–25)
CALCIUM SERPL-MCNC: 9.7 MG/DL (ref 8.3–10.1)
CHLORIDE SERPL-SCNC: 109 MMOL/L (ref 100–108)
CO2 SERPL-SCNC: 23 MMOL/L (ref 21–32)
CREAT SERPL-MCNC: 3.13 MG/DL (ref 0.6–1.3)
GFR SERPL CREATININE-BSD FRML MDRD: 20 ML/MIN/1.73SQ M
GLUCOSE P FAST SERPL-MCNC: 101 MG/DL (ref 65–99)
POTASSIUM SERPL-SCNC: 4.5 MMOL/L (ref 3.5–5.3)
SODIUM SERPL-SCNC: 139 MMOL/L (ref 136–145)

## 2020-12-15 PROCEDURE — 36415 COLL VENOUS BLD VENIPUNCTURE: CPT

## 2020-12-15 PROCEDURE — 93296 REM INTERROG EVL PM/IDS: CPT | Performed by: INTERNAL MEDICINE

## 2020-12-15 PROCEDURE — 93294 REM INTERROG EVL PM/LDLS PM: CPT | Performed by: INTERNAL MEDICINE

## 2020-12-15 PROCEDURE — 99214 OFFICE O/P EST MOD 30 MIN: CPT | Performed by: INTERNAL MEDICINE

## 2020-12-15 PROCEDURE — 80048 BASIC METABOLIC PNL TOTAL CA: CPT

## 2020-12-17 NOTE — PROGRESS NOTES
NEPHROLOGY PROGRESS NOTE   Hilda Trujillo 70 y o  male MRN: [id number]  Unit/Bed#: E4 -01 Encounter: [id number]      ASSESSMENT:    68-year-old male presented with bradycardia and complete heart block status post permanent pacemaker placement complicated by hypotension and acute kidney injury    1  Acute kidney injury secondary to ATN in the setting of diuretics and ARB use with stage 3 chronic kidney disease  2  Anion gap metabolic acidosis resolved  3  Diastolic congestive heart failure and bradycardia with complete heart block  4  Proteinuric kidney disease in the setting of longstanding type 2 diabetes mellitus  5   Urinary retention    PLAN:    -creatinine now back to baseline  -patient known to me as an outpatient was on metoprolol 100 mg 2 times daily, losartan 100 mg daily, furosemide 40 mg daily as needed, and chlorthalidone 25 mg daily along with amlodipine 10 mg daily  -now off nicardipine drip  -blood pressures are now low after pacemaker placed still with some tachycardia which is likely contributing since some lower blood pressure  -amlodipine was discontinued yesterday today will discontinue losartan  -rate-controlling medications can be up titrated as tolerated and other blood pressure medications can be re-initiated thereafter  -still with peripheral edema continue Lasix 40 mg IV b i d , low-sodium diet and I have added a fluid restriction  -serologic workup was done for his proteinuria as an outpatient and was negative, creatinine was stable with a history of diabetes will monitor and treat conservatively    -void trial after patient ambulating and sitting up bed  -hemoglobin stable   -now with [facility] following and will determine when can be removed/voiding trial    SUBJECTIVE:    Patient seen today denies any new chest pain did have an episode heat flash injury Shortness of breath overnight    OBJECTIVE:  Current Weight: Weight - Scale: 116 kg (255 lb 4 7 oz)  Vitals: Please call [phone] to schedule your CT scan. [facility] be done at [facility] or [facility]. We will contact you with results once Dr. Lucia has reviewed them. Please call our office if you have questions, [phone].  We will contact you to schedule a cystoscopy once Dr. Lucia starts clinic at the [facility].     11/08/18 0815   BP:    Pulse:    Resp:    Temp:    SpO2: 93%       Intake/Output Summary (Last 24 hours) at 11/08/18 1109  Last data filed at 11/08/18 7890   Gross per 24 hour   Intake                0 ml   Output             2850 ml   Net            -2850 ml     Weight (last 2 days)     Date/Time   Weight    11/08/18 0600  116 (255 29)    11/07/18 1810  117 (257 5)    11/07/18 0540  112 (247 8)    11/06/18 0600  113 (250)            Weight change: 4 4 kg (9 lb 11 2 oz)    General: conscious, cooperative, in not acute distress  Eyes: conjunctivae pink, anicteric sclerae  ENT: lips and mucous membranes moist  Neck: supple, no JVD  Chest: clear breath sounds bilateral, no crackles, ronchus or wheezings  CVS: distinct S1 & S2, normal rate, regular rhythm  Abdomen: soft, non-tender, non-distended, normoactive bowel sounds  Extremities:  1+ edema  Skin: no rash  Neuro: awake, alert, oriented    Medications:    Current Facility-Administered Medications:     acetaminophen (TYLENOL) rectal suppository 650 mg, 650 mg, Rectal, Q4H PRN, Maryrose Marie Spatzer, CRNP, 650 mg at 11/02/18 0507    allopurinol (ZYLOPRIM) tablet 300 mg, 300 mg, Oral, Daily, Meryle Jacquet Bilofsky, CRNP, 300 mg at 11/08/18 0827    aspirin (ECOTRIN LOW STRENGTH) EC tablet 81 mg, 81 mg, Oral, Daily, Meryle Jacquet Bilofsky, CRNP, 81 mg at 11/08/18 0827    cefepime (MAXIPIME) 2,000 mg in dextrose 5 % 50 mL IVPB, 2,000 mg, Intravenous, Q12H, PAT Chan, Last Rate: 100 mL/hr at 11/08/18 0827, 2,000 mg at 11/08/18 0827    cholecalciferol (VITAMIN D3) tablet 1,000 Units, 1,000 Units, Oral, Daily, Meryle Jacquet Bilofsky, CRNP, 1,000 Units at 11/08/18 0827    diltiazem (CARDIZEM) injection 10 mg, 10 mg, Intravenous, Q6H PRN, Meryle Jacquet Bilofsky, CRNP, 10 mg at 11/08/18 0453    docusate sodium (COLACE) capsule 100 mg, 100 mg, Oral, BID, Meryle Jacquet Bilofsky, CRNP, 100 mg at 11/08/18 0827    furosemide (LASIX) injection 40 mg, 40 mg, Intravenous, BID (diuretic), Betty Jaeger DO, 40 mg at 11/08/18 0827    heparin (porcine) subcutaneous injection 5,000 Units, 5,000 Units, Subcutaneous, Q8H [facility], 5,000 Units at 11/08/18 0503 **AND** Platelet count, , , Once, PAT Olsen    insulin lispro (HumaLOG) 100 units/mL subcutaneous injection 1-6 Units, 1-6 Units, Subcutaneous, 4x Daily (AC & HS), 1 Units at 11/08/18 0827 **AND** Fingerstick Glucose (POCT), , , 4x Daily AC and at bedtime, Tunde Lin MD    [facility] inhalation solution 1 25 mg, 1 25 mg, Nebulization, TID, Ala Situ, PAT, 1 25 mg at 11/08/18 0815    metoprolol tartrate (LOPRESSOR) tablet 50 mg, 50 mg, Oral, Q12H [facility], Shashi Enrique MD, 50 mg at 11/08/18 0827    pravastatin (PRAVACHOL) tablet 80 mg, 80 mg, Oral, Daily With Melene Roberto Carlos, PAT, 80 mg at 11/07/18 1623    senna (SENOKOT) tablet 17 2 mg, 2 tablet, Oral, HS, PAT Olsen, 17 2 mg at 11/07/18 2112    sodium chloride 0 9 % inhalation solution 3 mL, 3 mL, Nebulization, TID, Tunde Lin MD, 3 mL at 11/08/18 0815    tamsulosin (FLOMAX) capsule 0 4 mg, 0 4 mg, Oral, Daily With Kariene PAT Azevedo, 0 4 mg at 11/07/18 1623    Invasive Devices:      Lab Results:     Results from last 7 days  Lab Units 11/08/18  0643 11/07/18  1009 11/07/18  0453 11/06/18  0445 11/05/18  0457 11/04/18  0433 11/03/18  0433  11/02/18  0204 11/01/18  2309 11/01/18  2305  11/01/18  1630 11/01/18  1614 11/01/18  1215   WBC Thousand/uL  --   --  10 15 11 32* 15 67* 16 75* 15 35*  < >  --   --   --   --   --   --  20 67*   HEMOGLOBIN g/dL  --   --  8 5* 9 1* 9 1* 9 0* 9 0*  < >  --   --   --   --   --   --  11 4*   HEMATOCRIT %  --   --  25 9* 27 7* 27 9* 27 7* 26 5*  < >  --   --   --   --  <15*  --  35 6*   PLATELETS Thousands/uL  --   --  272 286 255 215 225  < >  --   --   --   --   --  346 397*   POTASSIUM mmol/L 4 1 4 0  --   --  3 9 4 4 4 3  < >  --   --   --   < >  --  4 9 5 2   CHLORIDE mmol/L 102 102  --   --  104 106 111*  < >  -- --   --   < >  --  107 106   CO2 mmol/L 23 24  --   --  23 23 24  < >  --   --   --   < >  --  17* 18*   CO2, I-STAT mmol/L  --   --   --   --   --   --   --   --   --   --   --   --  7*  --   --    BUN mg/dL 35* 37*  --   --  37* 34* 40*  < >  --   --   --   < >  --  39* 37*   CREATININE mg/dL 1 20 1 26  --   --  1 24 1 30 1 73*  < >  --   --   --   < >  --  2 36* 1 99*   CALCIUM mg/dL 8 4 8 5  --   --  8 4 8 3 7 9*  < >  --   --   --   < >  --  8 0* 8 8   MAGNESIUM mg/dL  --  2 0  --   --  2 1 2 0  --   --   --   --   --   --   --  2 4 2 3   PHOSPHORUS mg/dL  --   --   --   --   --   --   --   --   --   --   --   --   --  8 1*  --    ALK PHOS U/L  --   --   --   --   --   --   --   --   --   --   --   --   --  80 85   ALT U/L  --   --   --   --   --   --   --   --   --   --   --   --   --  61 33   AST U/L  --   --   --   --   --   --   --   --   --   --   --   --   --  65* 28   GLUCOSE, ISTAT mg/dl  --   --   --   --   --   --   --   --   --   --   --   --  142*  --   --    BLOOD CULTURE   --   --   --   --   --   --   --   --   --  No Growth After 5 Days  No Growth After 5 Days  --   --   --   --    LEUKOCYTES UA   --   --   --   --   --   --   --   --  Negative  --   --   --   --   --   --    BLOOD UA   --   --   --   --   --   --   --   --  Moderate*  --   --   --   --   --   --    < > = values in this interval not displayed      Previous work up:  Please see previous notes

## 2020-12-22 ENCOUNTER — TELEPHONE (OUTPATIENT)
Dept: FAMILY MEDICINE CLINIC | Facility: CLINIC | Age: 65
End: 2020-12-22

## 2020-12-22 NOTE — H&P (VIEW-ONLY)
[facility] - Outpatient Note  Sandra Mares 77 y o  male MRN: [id number]  Unit/Bed#:  Encounter: [id number]          ASSESSMENT AND PLAN:        Colonoscopy and upper endoscopy will be done  This will be expedited and a hospital setting  We will need approval to stop his Eliquis for 2 days before the procedure  He will need a Dulcolax and GoLYTELY prep  I've asked him to get his iron studies done  Diagnoses and all orders for this visit:    Gastric ulcer, unspecified chronicity, unspecified whether gastric ulcer hemorrhage or perforation present    Screening for colon cancer    Anemia, unspecified type          ______________________________________________________________________    HPI:  The patient returned the office  Apparently everything got delayed because of worries over Covid  Most recently his hemoglobin went from 10-8 5 and nephrology and iron studies were ordered but not yet done  He has noted no melena or rectal bleeding  He is able to eat fine without problems  He is not getting heartburn or any trouble swallowing foods  He remains on Eliquis  REVIEW OF SYSTEMS:    CONSTITUTIONAL: Denies any fever, chills, rigors, and weight loss  HEENT: No earache or tinnitus  Denies hearing loss or visual disturbances  CARDIOVASCULAR: No chest pain or palpitations  RESPIRATORY: Denies any cough, hemoptysis, shortness of breath or dyspnea on exertion  GASTROINTESTINAL: As noted in the History of Present Illness  GENITOURINARY: No problems with urination  Denies any hematuria or dysuria  NEUROLOGIC: No dizziness or vertigo, denies headaches  MUSCULOSKELETAL: Denies any muscle or joint pain  SKIN: Denies skin rashes or itching  ENDOCRINE: Denies excessive thirst  Denies intolerance to heat or cold  PSYCHOSOCIAL: Denies depression or anxiety  Denies any recent memory loss         Historical Information   Past Medical History:   Diagnosis Date    Arthritis     OA    Bruise of both arms     forearms and both hands    Bruises easily     [facility]     Diabetes mellitus [facility] [address] [facility]     Diabetic foot ulcer [facility] [address] [facility]     Eczema     Erectile dysfunction     Gout     Hyperlipidemia     Hypertension     Murmur     Nephropathy     Osteoarthritis     [facility]peripheral artery disease) [facility]     Seasonal allergies     Toe infection     bilat great toes    Vertigo     Walks frequently     Wears dentures     upper    Wears glasses      Past Surgical History:   Procedure Laterality Date    CARDIAC PACEMAKER PLACEMENT      CARPAL TUNNEL RELEASE Left     CARPAL TUNNEL RELEASE Right     CARPAL TUNNEL RELEASE      COLONOSCOPY  08/2020    FOOT AMPUTATION Left 2/10/2020    Procedure: PARTIAL 1ST RAY AMPUTATION;  Surgeon: Nga Ma DPM;  [facility];  Service: Podiatry    INCISION AND DRAINAGE OF WOUND Left 1/12/2020    Procedure: INCISION AND DRAINAGE (I&D) EXTREMITY AND REMOVAL OF SESMOID BONE;  Surgeon: Franklin Paulson DPM;  [facility];  Service: Podiatry    IR PICC LINE REPOSITION  1/14/2020    KNEE ARTHROSCOPY Left     KNEE ARTHROSCOPY Right     KNEE SURGERY      MS AMPUTATION TOE,I-P JT Bilateral 5/2/2017    Procedure: PARTIAL AMPUTATION RIGHT AND LEFT HALLUX ;  Surgeon: Nga Ma DPM;  [facility];  Service: Podiatry    MS AMPUTATION TOE,MT-P JT Left 7/25/2017    Procedure: 2ND TOE AMPUTATION;  Surgeon: Nga Ma DPM;  [facility];  Service: Podiatry    SHOULDER ARTHROSCOPY Left     with screws,RTC    SHOULDER SURGERY      TOE AMPUTATION Left 1/8/2020    Procedure: HALLUX AMPUTATION;  Surgeon: Franklin Paulson DPM;  [facility];  Service: Podiatry    UPPER GASTROINTESTINAL ENDOSCOPY  03/2020    Intestinal metaplasia prepyloric    VASECTOMY       Social History   Social History     Substance and Sexual Activity   Alcohol Use No    Frequency: Never    Drinks per session: Patient refused    Binge frequency: Never     Social History     Substance and Sexual Activity   Drug Use No     Social History     Tobacco Use   Smoking Status Former Smoker    Packs/day: 0 25    Years: 20 00    Pack years: 5 00    Types: Cigarettes    Quit date: 2010    Years since quitting: 10 9   Smokeless Tobacco Never Used   Tobacco Comment    quit 10 years ago     Family History   Problem Relation Age of Onset    Heart disease Father     Hypertension Father     Pulmonary embolism Father     Hypertension Mother        Meds/Allergies       Current Outpatient Medications:     allopurinol (ZYLOPRIM) 300 mg tablet    amLODIPine (NORVASC) 10 mg tablet    apixaban (ELIQUIS) 5 mg    betamethasone valerate (VALISONE) 0 1 % cream    Cholecalciferol (VITAMIN D-3) 1000 units CAPS    Dupilumab (Dupixent) 300 MG/2ML SOPN    famotidine (PEPCID) 40 MG tablet    glucose blood (OneTouch Ultra) test strip    Insulin Degludec-Liraglutide (Insulin Degludec-Liraglutide) 100 units-3 6 mg/mL injection pen    Insulin Pen Needle (BD Pen Needle Zenaida U/F) 32G X 4 MM MISC    metoprolol tartrate (LOPRESSOR) 50 mg tablet    Multiple Vitamin (MULTIVITAMIN) tablet    omega-3-acid ethyl esters (LOVAZA) 1 g capsule    simvastatin (ZOCOR) 20 mg tablet    sodium bicarbonate 650 mg tablet    tamsulosin (FLOMAX) 0 4 mg    torsemide (DEMADEX) 20 mg tablet    Glucagon (Baqsimi Two Pack) 3 MG/DOSE POWD    metolazone (ZAROXOLYN) 5 mg tablet    polyethylene glycol-electrolytes (TriLyte) 4000 mL solution    Allergies   Allergen Reactions    Invokana [Canagliflozin]     Lamisil [Terbinafine] Rash    Lamisil [Terbinafine] Blisters     Wife states " His skin peeled from head to toe"    Other Swelling     Pomegranate - facial swelling, no swelling of tongue, esophagus  Adhesive tape        Latex Rash           Objective     Blood pressure 150/72, pulse 78, temperature 98 °F (36 7 °C), temperature source Temporal, height 5' 10" (1 778 m), weight 93 9 kg (207 lb)  Body mass index is 29 7 kg/m²  PHYSICAL EXAM:      General Appearance:   Alert, cooperative, no distress   HEENT:   Normocephalic, atraumatic, anicteric  Neck:  Supple, symmetrical, trachea midline   Lungs:   Clear to auscultation bilaterally; no rales, rhonchi or wheezing; respirations unlabored    Heart[de-identified]   Regular rate and rhythm; no murmur, rub, or gallop  Abdomen:   Soft, non-tender, non-distended; normal bowel sounds; no masses, no organomegaly    Genitalia:   Deferred    Rectal:   Deferred    Extremities:  No cyanosis, clubbing or edema    Pulses:  2+ and symmetric all extremities    Skin:  No jaundice, rashes, or lesions    Lymph nodes:  No palpable cervical lymphadenopathy        Lab Results:   No visits with results within 1 Day(s) from this visit     Latest known visit with results is:   Lab on 12/15/2020   Component Date Value    Sodium 12/15/2020 139     Potassium 12/15/2020 4 5     Chloride 12/15/2020 109*    CO2 12/15/2020 23     ANION GAP 12/15/2020 7     BUN 12/15/2020 49*    Creatinine 12/15/2020 3 13*    Glucose, Fasting 12/15/2020 101*    Calcium 12/15/2020 9 7     eGFR 12/15/2020 20

## 2020-12-30 ENCOUNTER — LAB (OUTPATIENT)
Dept: LAB | Facility: CLINIC | Age: 65
End: 2020-12-30
Payer: MEDICARE

## 2020-12-30 DIAGNOSIS — D64.9 ANEMIA, UNSPECIFIED TYPE: ICD-10-CM

## 2020-12-30 DIAGNOSIS — D50.0 IRON DEFICIENCY ANEMIA DUE TO CHRONIC BLOOD LOSS: ICD-10-CM

## 2020-12-30 LAB
BASOPHILS # BLD AUTO: 0.08 THOUSANDS/ΜL (ref 0–0.1)
BASOPHILS NFR BLD AUTO: 1 % (ref 0–1)
EOSINOPHIL # BLD AUTO: 0.45 THOUSAND/ΜL (ref 0–0.61)
EOSINOPHIL NFR BLD AUTO: 4 % (ref 0–6)
ERYTHROCYTE [DISTWIDTH] IN BLOOD BY AUTOMATED COUNT: 14.8 % (ref 11.6–15.1)
FERRITIN SERPL-MCNC: 20 NG/ML (ref 8–388)
HCT VFR BLD AUTO: 26.9 % (ref 36.5–49.3)
HGB BLD-MCNC: 8.3 G/DL (ref 12–17)
IMM GRANULOCYTES # BLD AUTO: 0.04 THOUSAND/UL (ref 0–0.2)
IMM GRANULOCYTES NFR BLD AUTO: 0 % (ref 0–2)
IRON SATN MFR SERPL: 7 %
IRON SERPL-MCNC: 33 UG/DL (ref 65–175)
LYMPHOCYTES # BLD AUTO: 1.12 THOUSANDS/ΜL (ref 0.6–4.47)
LYMPHOCYTES NFR BLD AUTO: 10 % (ref 14–44)
MCH RBC QN AUTO: 27.1 PG (ref 26.8–34.3)
MCHC RBC AUTO-ENTMCNC: 30.9 G/DL (ref 31.4–37.4)
MCV RBC AUTO: 88 FL (ref 82–98)
MONOCYTES # BLD AUTO: 0.95 THOUSAND/ΜL (ref 0.17–1.22)
MONOCYTES NFR BLD AUTO: 8 % (ref 4–12)
NEUTROPHILS # BLD AUTO: 9.08 THOUSANDS/ΜL (ref 1.85–7.62)
NEUTS SEG NFR BLD AUTO: 77 % (ref 43–75)
NRBC BLD AUTO-RTO: 0 /100 WBCS
PLATELET # BLD AUTO: 360 THOUSANDS/UL (ref 149–390)
PMV BLD AUTO: 9.6 FL (ref 8.9–12.7)
RBC # BLD AUTO: 3.06 MILLION/UL (ref 3.88–5.62)
TIBC SERPL-MCNC: 442 UG/DL (ref 250–450)
WBC # BLD AUTO: 11.72 THOUSAND/UL (ref 4.31–10.16)

## 2020-12-30 PROCEDURE — 85025 COMPLETE CBC W/AUTO DIFF WBC: CPT

## 2020-12-30 PROCEDURE — 83540 ASSAY OF IRON: CPT

## 2020-12-30 PROCEDURE — 36415 COLL VENOUS BLD VENIPUNCTURE: CPT

## 2020-12-30 PROCEDURE — 83550 IRON BINDING TEST: CPT

## 2020-12-30 PROCEDURE — 82728 ASSAY OF FERRITIN: CPT

## 2021-01-05 DIAGNOSIS — E11.22 TYPE 2 DIABETES MELLITUS WITH STAGE 3 CHRONIC KIDNEY DISEASE, WITH LONG-TERM CURRENT USE OF INSULIN (HCC): ICD-10-CM

## 2021-01-05 DIAGNOSIS — Z79.4 TYPE 2 DIABETES MELLITUS WITH STAGE 3 CHRONIC KIDNEY DISEASE, WITH LONG-TERM CURRENT USE OF INSULIN (HCC): ICD-10-CM

## 2021-01-05 DIAGNOSIS — N18.30 TYPE 2 DIABETES MELLITUS WITH STAGE 3 CHRONIC KIDNEY DISEASE, WITH LONG-TERM CURRENT USE OF INSULIN (HCC): ICD-10-CM

## 2021-01-05 RX ORDER — BLOOD SUGAR DIAGNOSTIC
STRIP MISCELLANEOUS
Qty: 100 EACH | Refills: 3 | Status: SHIPPED | OUTPATIENT
Start: 2021-01-05

## 2021-01-07 ENCOUNTER — ANESTHESIA EVENT (OUTPATIENT)
Dept: GASTROENTEROLOGY | Facility: HOSPITAL | Age: 66
End: 2021-01-07

## 2021-01-07 ENCOUNTER — TELEPHONE (OUTPATIENT)
Dept: OTHER | Facility: OTHER | Age: 66
End: 2021-01-07

## 2021-01-08 ENCOUNTER — ANESTHESIA (OUTPATIENT)
Dept: GASTROENTEROLOGY | Facility: HOSPITAL | Age: 66
End: 2021-01-08

## 2021-01-08 ENCOUNTER — HOSPITAL ENCOUNTER (OUTPATIENT)
Dept: GASTROENTEROLOGY | Facility: HOSPITAL | Age: 66
Setting detail: OUTPATIENT SURGERY
Discharge: HOME | End: 2021-01-08
Attending: INTERNAL MEDICINE
Payer: MEDICARE

## 2021-01-08 VITALS — HEART RATE: 63 BPM

## 2021-01-08 VITALS
SYSTOLIC BLOOD PRESSURE: 139 MMHG | BODY MASS INDEX: 28.63 KG/M2 | HEIGHT: 70 IN | RESPIRATION RATE: 23 BRPM | TEMPERATURE: 98.4 F | HEART RATE: 64 BPM | WEIGHT: 200 LBS | OXYGEN SATURATION: 92 % | DIASTOLIC BLOOD PRESSURE: 63 MMHG

## 2021-01-08 DIAGNOSIS — Z12.11 SCREENING FOR COLON CANCER: ICD-10-CM

## 2021-01-08 DIAGNOSIS — K25.9 GASTRIC ULCER, UNSPECIFIED CHRONICITY, UNSPECIFIED WHETHER GASTRIC ULCER HEMORRHAGE OR PERFORATION PRESENT: ICD-10-CM

## 2021-01-08 DIAGNOSIS — D64.9 ANEMIA, UNSPECIFIED TYPE: ICD-10-CM

## 2021-01-08 DIAGNOSIS — D50.0 IRON DEFICIENCY ANEMIA DUE TO CHRONIC BLOOD LOSS: ICD-10-CM

## 2021-01-08 LAB — GLUCOSE SERPL-MCNC: 130 MG/DL (ref 65–140)

## 2021-01-08 PROCEDURE — 43239 EGD BIOPSY SINGLE/MULTIPLE: CPT | Performed by: INTERNAL MEDICINE

## 2021-01-08 PROCEDURE — 88305 TISSUE EXAM BY PATHOLOGIST: CPT | Performed by: PATHOLOGY

## 2021-01-08 PROCEDURE — 88342 IMHCHEM/IMCYTCHM 1ST ANTB: CPT | Performed by: PATHOLOGY

## 2021-01-08 PROCEDURE — 45385 COLONOSCOPY W/LESION REMOVAL: CPT | Performed by: INTERNAL MEDICINE

## 2021-01-08 PROCEDURE — 88341 IMHCHEM/IMCYTCHM EA ADD ANTB: CPT | Performed by: PATHOLOGY

## 2021-01-08 PROCEDURE — 45381 COLONOSCOPY SUBMUCOUS NJX: CPT | Performed by: INTERNAL MEDICINE

## 2021-01-08 PROCEDURE — 82948 REAGENT STRIP/BLOOD GLUCOSE: CPT

## 2021-01-08 RX ORDER — PROPOFOL 10 MG/ML
INJECTION, EMULSION INTRAVENOUS AS NEEDED
Status: DISCONTINUED | OUTPATIENT
Start: 2021-01-08 | End: 2021-01-08

## 2021-01-08 RX ORDER — SODIUM CHLORIDE 9 MG/ML
125 INJECTION, SOLUTION INTRAVENOUS CONTINUOUS
Status: DISCONTINUED | OUTPATIENT
Start: 2021-01-08 | End: 2021-01-12 | Stop reason: HOSPADM

## 2021-01-08 RX ADMIN — PROPOFOL 50 MG: 10 INJECTION, EMULSION INTRAVENOUS at 09:41

## 2021-01-08 RX ADMIN — PROPOFOL 50 MG: 10 INJECTION, EMULSION INTRAVENOUS at 09:53

## 2021-01-08 RX ADMIN — SODIUM CHLORIDE 125 ML/HR: 0.9 INJECTION, SOLUTION INTRAVENOUS at 09:01

## 2021-01-08 RX ADMIN — PROPOFOL 150 MG: 10 INJECTION, EMULSION INTRAVENOUS at 09:28

## 2021-01-08 RX ADMIN — PROPOFOL 50 MG: 10 INJECTION, EMULSION INTRAVENOUS at 09:37

## 2021-01-08 RX ADMIN — PROPOFOL 50 MG: 10 INJECTION, EMULSION INTRAVENOUS at 09:33

## 2021-01-08 RX ADMIN — PROPOFOL 50 MG: 10 INJECTION, EMULSION INTRAVENOUS at 09:47

## 2021-01-08 RX ADMIN — PROPOFOL 50 MG: 10 INJECTION, EMULSION INTRAVENOUS at 09:31

## 2021-01-08 NOTE — DISCHARGE INSTRUCTIONS
Please call [phone] with any problems  Your stomach was normal   I did remove a large polyp from your more distal colon however the preparation was not the best either  I did place a clip over where the polyp was removed to help prevent bleeding  You may restart your Eliquis in 2 days  Please make she return to the office to go over pathology  I have suggested repeat examination in 6 months pending biopsies  Upper Endoscopy   WHAT YOU NEED TO KNOW:   An upper endoscopy is also called an upper gastrointestinal (GI) endoscopy, or an esophagogastroduodenoscopy (EGD)  You may feel bloated, gassy, or have some abdominal discomfort after your procedure  Your throat may be sore for 24 to 36 hours  You may burp or pass gas from air that is still inside your body  DISCHARGE INSTRUCTIONS:   Call 911 if:   · You have sudden chest pain or trouble breathing  Seek care immediately if:   · You feel dizzy or faint  · You have trouble swallowing  · You have severe throat pain  · Your bowel movements are very dark or black  · Your abdomen is hard and firm and you have severe pain  · You vomit blood  Contact your healthcare provider if:   · You feel full or bloated and cannot burp or pass gas  · You have not had a bowel movement for 3 days after your procedure  · You have neck pain  · You have a fever or chills  · You have nausea or are vomiting  · You have a rash or hives  · You have questions or concerns about your endoscopy  Relieve a sore throat:  Suck on throat lozenges or crushed ice  Gargle with a small amount of warm salt water  Mix 1 teaspoon of salt and 1 cup of warm water to make salt water  Relieve gas and discomfort from bloating:  Lie on your right side with a heating pad on your abdomen  Take short walks to help pass gas  Eat small meals until bloating is relieved    Rest after your procedure:  Do not drive or make important decisions until the day after your procedure  Return to your normal activity as directed  You can usually return to work the day after your procedure  Follow up with your healthcare provider as directed:  Write down your questions so you remember to ask them during your visits  © Copyright [address] Information is for End User's use only and may not be sold, redistributed or otherwise used for commercial purposes  All illustrations and images included in CareNotes® are the copyrighted property of A D A M , Inc  or [facility] Alexis Richard   The above information is an  only  It is not intended as medical advice for individual conditions or treatments  Talk to your doctor, nurse or pharmacist before following any medical regimen to see if it is safe and effective for you  Colonoscopy   WHAT YOU NEED TO KNOW:   A colonoscopy is a procedure to examine the inside of your colon (intestine) with a scope  Polyps or tissue growths may have been removed during your colonoscopy  It is normal to feel bloated and to have some abdominal discomfort  You should be passing gas  If you have hemorrhoids or you had polyps removed, you may have a small amount of bleeding  DISCHARGE INSTRUCTIONS:   Call your doctor if:   · You have a large amount of bright red blood in your bowel movements  · Your abdomen is hard and firm and you have severe pain  · You have sudden trouble breathing  · You develop a rash or hives  · You have a fever within 24 hours of your procedure  · You have not had a bowel movement for 3 days after your procedure  · You have questions or concerns about your condition or care  After your colonoscopy:   · Do not lift, strain, or run  for 3 days  · Rest as much as possible  You have been given medicine to relax you  Do not  drive or make important decisions for at least 24 hours  Return to your normal activity as directed      · Relieve gas and discomfort from bloating  by lying on your left side with a heating pad on your abdomen  You may need to take short walks to help the gas move out  Eat small meals until bloating is relieved  If you had polyps removed: For 7 days after your procedure:  · Do not  take aspirin  · Do not  go on long car rides  Help prevent constipation:   · Eat a variety of healthy foods  Healthy foods include fruit, vegetables, whole-grain breads, low-fat dairy products, beans, lean meat, and fish  Ask if you need to be on a special diet  Your healthcare provider may recommend that you eat high-fiber foods such as cooked beans  Fiber helps you have regular bowel movements  · Drink liquids as directed  Adults should drink between 9 and 13 eight-ounce cups of liquid every day  Ask what amount is best for you  For most people, good liquids to drink are water, juice, and milk  · Exercise as directed  Talk to your healthcare provider about the best exercise plan for you  Exercise can help prevent constipation, decrease your blood pressure and improve your health  Follow up with your healthcare provider as directed:  Write down your questions so you remember to ask them during your visits  © Copyright [address] Information is for End User's use only and may not be sold, redistributed or otherwise used for commercial purposes  All illustrations and images included in CareNotes® are the copyrighted property of [employer]  or [facility] Alexis Richard   The above information is an  only  It is not intended as medical advice for individual conditions or treatments  Talk to your doctor, nurse or pharmacist before following any medical regimen to see if it is safe and effective for you

## 2021-01-08 NOTE — INTERVAL H&P NOTE
H&P reviewed  After examining the patient I find no changes in the patients condition since the H&P had been written      Vitals:    01/08/21 0852   BP: 162/71   Pulse: 67   Resp: 18   Temp: 98 4 °F (36 9 °C)   SpO2: 97%

## 2021-01-08 NOTE — ANESTHESIA PREPROCEDURE EVALUATION
Procedure:  COLONOSCOPY  EGD    Relevant Problems   CARDIO   (+) Complete heart block (HCC)   (+) Hyperlipidemia   (+) Hypertension with renal disease   (+) Nonrheumatic aortic valve stenosis   (+) Other hyperlipidemia   (+) PAF (paroxysmal atrial fibrillation) ([facility]   (+) PVCs (premature ventricular contractions)   (+) Peripheral arterial disease (HCC)   (+) Renovascular hypertension   (+) SSS (sick sinus syndrome) ([facility])   (+) Systolic murmur      ENDO   (+) Type 2 diabetes mellitus with chronic kidney disease, with long-term current use of insulin ([facility])   (+) Type 2 diabetes mellitus with diabetic neuropathy, without long-term current use of insulin (HCC)      /RENAL   (+) Acute renal failure (ARF) ([facility])   (+) Diabetic kidney disease (HCC)   (+) Stage 4 chronic kidney disease ([facility])   (+) Uremia      HEMATOLOGY   (+) Anemia   (+) Iron deficiency anemia      MUSCULOSKELETAL   (+) Gout   (+) Osteoarthritis      NEURO/PSYCH   (+) Anxiety      PULMONARY   (+) Acute respiratory failure with hypoxia ([facility])   (+) Chronic obstructive pulmonary disease (HCC)   (+) NICOLASA (obstructive sleep apnea)      Other   (+) Diabetes mellitus with neuropathy ([facility])   (+) Osteomyelitis of left foot ([facility])        Physical Exam    Airway    Mallampati score: II  TM Distance: >3 FB  Neck ROM: full     Dental   upper dentures,     Cardiovascular  Rhythm: regular, Rate: normal, Murmur,     Pulmonary  Pulmonary exam normal Breath sounds clear to auscultation,     Other Findings        Anesthesia Plan  ASA Score- 4     Anesthesia Type- general with ASA Monitors  Additional Monitors:   Airway Plan:           Plan Factors-    Chart reviewed  EKG reviewed  Existing labs reviewed  Patient summary reviewed  Patient is not a current smoker  Induction- intravenous  Postoperative Plan-     Informed Consent- Anesthetic plan and risks discussed with patient

## 2021-01-08 NOTE — ANESTHESIA POSTPROCEDURE EVALUATION
Post-Op Assessment Note    CV Status:  Stable    Pain management: adequate     Mental Status:  Alert   PONV Controlled:  None   Airway Patency:  Patent      Post Op Vitals Reviewed: Yes      Staff: Anesthesiologist       Blood pressure 139/63, pulse 64, temperature 98 4 °F (36 9 °C), temperature source Temporal, resp  rate (!) 23, height 5' 10" (1 778 m), weight 90 7 kg (200 lb), SpO2 92 %  No complications documented      BP      Temp      Pulse     Resp     SpO2

## 2021-01-11 ENCOUNTER — TELEPHONE (OUTPATIENT)
Dept: ENDOCRINOLOGY | Facility: CLINIC | Age: 66
End: 2021-01-11

## 2021-01-11 ENCOUNTER — LAB (OUTPATIENT)
Dept: LAB | Facility: CLINIC | Age: 66
End: 2021-01-11
Payer: MEDICARE

## 2021-01-11 DIAGNOSIS — Z79.4 TYPE 2 DIABETES MELLITUS WITH STAGE 3 CHRONIC KIDNEY DISEASE, WITH LONG-TERM CURRENT USE OF INSULIN (HCC): ICD-10-CM

## 2021-01-11 DIAGNOSIS — E78.2 MIXED HYPERLIPIDEMIA: ICD-10-CM

## 2021-01-11 DIAGNOSIS — E11.22 TYPE 2 DIABETES MELLITUS WITH STAGE 3 CHRONIC KIDNEY DISEASE, WITH LONG-TERM CURRENT USE OF INSULIN (HCC): ICD-10-CM

## 2021-01-11 DIAGNOSIS — D50.9 IRON DEFICIENCY ANEMIA, UNSPECIFIED IRON DEFICIENCY ANEMIA TYPE: Primary | ICD-10-CM

## 2021-01-11 DIAGNOSIS — I10 ESSENTIAL HYPERTENSION: ICD-10-CM

## 2021-01-11 DIAGNOSIS — N18.4 CHRONIC KIDNEY DISEASE (CKD), STAGE IV (SEVERE) (HCC): ICD-10-CM

## 2021-01-11 DIAGNOSIS — N18.30 TYPE 2 DIABETES MELLITUS WITH STAGE 3 CHRONIC KIDNEY DISEASE, WITH LONG-TERM CURRENT USE OF INSULIN (HCC): ICD-10-CM

## 2021-01-11 LAB
ALBUMIN SERPL BCP-MCNC: 3.2 G/DL (ref 3.5–5)
ALP SERPL-CCNC: 103 U/L (ref 46–116)
ALT SERPL W P-5'-P-CCNC: 19 U/L (ref 12–78)
ANION GAP SERPL CALCULATED.3IONS-SCNC: 6 MMOL/L (ref 4–13)
AST SERPL W P-5'-P-CCNC: 17 U/L (ref 5–45)
BASOPHILS # BLD AUTO: 0.06 THOUSANDS/ΜL (ref 0–0.1)
BASOPHILS NFR BLD AUTO: 1 % (ref 0–1)
BILIRUB SERPL-MCNC: 0.57 MG/DL (ref 0.2–1)
BUN SERPL-MCNC: 45 MG/DL (ref 5–25)
CALCIUM ALBUM COR SERPL-MCNC: 9.6 MG/DL (ref 8.3–10.1)
CALCIUM SERPL-MCNC: 9 MG/DL (ref 8.3–10.1)
CHLORIDE SERPL-SCNC: 107 MMOL/L (ref 100–108)
CHOLEST SERPL-MCNC: 101 MG/DL (ref 50–200)
CO2 SERPL-SCNC: 24 MMOL/L (ref 21–32)
CREAT SERPL-MCNC: 3.18 MG/DL (ref 0.6–1.3)
CREAT UR-MCNC: 91.1 MG/DL
EOSINOPHIL # BLD AUTO: 0.49 THOUSAND/ΜL (ref 0–0.61)
EOSINOPHIL NFR BLD AUTO: 5 % (ref 0–6)
ERYTHROCYTE [DISTWIDTH] IN BLOOD BY AUTOMATED COUNT: 15 % (ref 11.6–15.1)
EST. AVERAGE GLUCOSE BLD GHB EST-MCNC: 126 MG/DL
FERRITIN SERPL-MCNC: 20 NG/ML (ref 8–388)
GFR SERPL CREATININE-BSD FRML MDRD: 19 ML/MIN/1.73SQ M
GLUCOSE P FAST SERPL-MCNC: 122 MG/DL (ref 65–99)
HBA1C MFR BLD: 6 %
HCT VFR BLD AUTO: 26.1 % (ref 36.5–49.3)
HDLC SERPL-MCNC: 29 MG/DL
HGB BLD-MCNC: 7.9 G/DL (ref 12–17)
IMM GRANULOCYTES # BLD AUTO: 0.04 THOUSAND/UL (ref 0–0.2)
IMM GRANULOCYTES NFR BLD AUTO: 0 % (ref 0–2)
IRON SATN MFR SERPL: 8 %
IRON SERPL-MCNC: 33 UG/DL (ref 65–175)
LDLC SERPL CALC-MCNC: 57 MG/DL (ref 0–100)
LYMPHOCYTES # BLD AUTO: 1.15 THOUSANDS/ΜL (ref 0.6–4.47)
LYMPHOCYTES NFR BLD AUTO: 13 % (ref 14–44)
MAGNESIUM SERPL-MCNC: 2.4 MG/DL (ref 1.6–2.6)
MCH RBC QN AUTO: 26.2 PG (ref 26.8–34.3)
MCHC RBC AUTO-ENTMCNC: 30.3 G/DL (ref 31.4–37.4)
MCV RBC AUTO: 86 FL (ref 82–98)
MICROALBUMIN UR-MCNC: 3560 MG/L (ref 0–20)
MICROALBUMIN/CREAT 24H UR: 3908 MG/G CREATININE (ref 0–30)
MONOCYTES # BLD AUTO: 0.71 THOUSAND/ΜL (ref 0.17–1.22)
MONOCYTES NFR BLD AUTO: 8 % (ref 4–12)
NEUTROPHILS # BLD AUTO: 6.68 THOUSANDS/ΜL (ref 1.85–7.62)
NEUTS SEG NFR BLD AUTO: 73 % (ref 43–75)
NRBC BLD AUTO-RTO: 0 /100 WBCS
PHOSPHATE SERPL-MCNC: 4.8 MG/DL (ref 2.3–4.1)
PLATELET # BLD AUTO: 346 THOUSANDS/UL (ref 149–390)
PMV BLD AUTO: 9.7 FL (ref 8.9–12.7)
POTASSIUM SERPL-SCNC: 4.1 MMOL/L (ref 3.5–5.3)
PROT SERPL-MCNC: 7.3 G/DL (ref 6.4–8.2)
PTH-INTACT SERPL-MCNC: 149.4 PG/ML (ref 18.4–80.1)
RBC # BLD AUTO: 3.02 MILLION/UL (ref 3.88–5.62)
SODIUM SERPL-SCNC: 137 MMOL/L (ref 136–145)
TIBC SERPL-MCNC: 437 UG/DL (ref 250–450)
TRIGL SERPL-MCNC: 75 MG/DL
URATE SERPL-MCNC: 4 MG/DL (ref 4.2–8)
WBC # BLD AUTO: 9.13 THOUSAND/UL (ref 4.31–10.16)

## 2021-01-11 PROCEDURE — 36415 COLL VENOUS BLD VENIPUNCTURE: CPT

## 2021-01-11 PROCEDURE — 83550 IRON BINDING TEST: CPT

## 2021-01-11 PROCEDURE — 80061 LIPID PANEL: CPT

## 2021-01-11 PROCEDURE — 80053 COMPREHEN METABOLIC PANEL: CPT

## 2021-01-11 PROCEDURE — 84550 ASSAY OF BLOOD/URIC ACID: CPT

## 2021-01-11 PROCEDURE — 82728 ASSAY OF FERRITIN: CPT

## 2021-01-11 PROCEDURE — 82043 UR ALBUMIN QUANTITATIVE: CPT

## 2021-01-11 PROCEDURE — 83540 ASSAY OF IRON: CPT

## 2021-01-11 PROCEDURE — 82570 ASSAY OF URINE CREATININE: CPT

## 2021-01-11 PROCEDURE — 83970 ASSAY OF PARATHORMONE: CPT

## 2021-01-11 PROCEDURE — 83735 ASSAY OF MAGNESIUM: CPT

## 2021-01-11 PROCEDURE — 83036 HEMOGLOBIN GLYCOSYLATED A1C: CPT

## 2021-01-11 PROCEDURE — 85025 COMPLETE CBC W/AUTO DIFF WBC: CPT

## 2021-01-11 PROCEDURE — 84100 ASSAY OF PHOSPHORUS: CPT

## 2021-01-11 RX ORDER — SODIUM CHLORIDE 9 MG/ML
20 INJECTION, SOLUTION INTRAVENOUS ONCE
Status: CANCELLED | OUTPATIENT
Start: 2021-01-19

## 2021-01-11 NOTE — TELEPHONE ENCOUNTER
Patient has an appt on 2/2 at 9:15 am   He is asking if this can be changed to virtual?    Joyce Killian

## 2021-01-12 ENCOUNTER — TELEPHONE (OUTPATIENT)
Dept: ENDOCRINOLOGY | Facility: CLINIC | Age: 66
End: 2021-01-12

## 2021-01-12 NOTE — TELEPHONE ENCOUNTER
----- Message from George Blake sent at 1/12/2021  8:22 AM EST -----  Please call patient, A1C at goal rest of labs stable

## 2021-01-18 ENCOUNTER — TELEPHONE (OUTPATIENT)
Dept: INFUSION CENTER | Facility: CLINIC | Age: 66
End: 2021-01-18

## 2021-01-18 NOTE — QUICK NOTE
I called the patient and discussed his polyp pathology showed adenocarcinoma in the polyp was removed  It also was clipped and tattooed  In any event he needs referral I believe to colorectal surgery to assess the situation and see if anything further needs to be done    He does have an appointment with me in several weeks but on the going to expedite his referral

## 2021-01-18 NOTE — TELEPHONE ENCOUNTER
Called to prescreen for covid-19; declined all s/s  Aware of visitor policy and to wear a mask  Appt confirmed

## 2021-01-19 ENCOUNTER — HOSPITAL ENCOUNTER (OUTPATIENT)
Dept: INFUSION CENTER | Facility: CLINIC | Age: 66
Discharge: HOME | End: 2021-01-19
Payer: MEDICARE

## 2021-01-19 VITALS
TEMPERATURE: 96.9 F | SYSTOLIC BLOOD PRESSURE: 137 MMHG | DIASTOLIC BLOOD PRESSURE: 61 MMHG | RESPIRATION RATE: 18 BRPM | HEART RATE: 76 BPM

## 2021-01-19 DIAGNOSIS — D50.9 IRON DEFICIENCY ANEMIA, UNSPECIFIED IRON DEFICIENCY ANEMIA TYPE: Primary | ICD-10-CM

## 2021-01-19 PROCEDURE — 96365 THER/PROPH/DIAG IV INF INIT: CPT

## 2021-01-19 RX ORDER — SODIUM CHLORIDE 9 MG/ML
20 INJECTION, SOLUTION INTRAVENOUS ONCE
Status: CANCELLED | OUTPATIENT
Start: 2021-01-26

## 2021-01-19 RX ORDER — GLUCAGON 3 MG/1
POWDER NASAL
Refills: 2 | OUTPATIENT
Start: 2021-01-19

## 2021-01-19 RX ORDER — SODIUM CHLORIDE 9 MG/ML
20 INJECTION, SOLUTION INTRAVENOUS ONCE
Status: COMPLETED | OUTPATIENT
Start: 2021-01-19 | End: 2021-01-19

## 2021-01-19 RX ADMIN — SODIUM CHLORIDE 20 ML/HR: 9 INJECTION, SOLUTION INTRAVENOUS at 14:05

## 2021-01-19 RX ADMIN — FERUMOXYTOL 510 MG: 510 INJECTION INTRAVENOUS at 14:08

## 2021-01-19 NOTE — PLAN OF CARE
Problem: Potential for Falls  Goal: Patient will remain free of falls  Description: INTERVENTIONS:  - Assess patient frequently for physical needs  -  Identify cognitive and physical deficits and behaviors that affect risk of falls    -  [place] fall precautions as indicated by assessment   - Educate patient/family on patient safety including physical limitations  - Instruct patient to call for assistance with activity based on assessment  - Modify environment to reduce risk of injury  - Consider OT/PT consult to assist with strengthening/mobility  Outcome: Progressing

## 2021-01-19 NOTE — PROGRESS NOTES
Patient tolerated Feraheme infusion well with no complications  Pt is aware to return next week for next infusion  AVS provided

## 2021-01-26 ENCOUNTER — HOSPITAL ENCOUNTER (OUTPATIENT)
Dept: INFUSION CENTER | Facility: CLINIC | Age: 66
Discharge: HOME | End: 2021-01-26
Payer: MEDICARE

## 2021-01-26 VITALS — HEART RATE: 89 BPM | TEMPERATURE: 97.9 F

## 2021-01-26 DIAGNOSIS — D50.9 IRON DEFICIENCY ANEMIA, UNSPECIFIED IRON DEFICIENCY ANEMIA TYPE: Primary | ICD-10-CM

## 2021-01-26 PROCEDURE — 96365 THER/PROPH/DIAG IV INF INIT: CPT

## 2021-01-26 RX ORDER — SODIUM CHLORIDE 9 MG/ML
20 INJECTION, SOLUTION INTRAVENOUS ONCE
Status: COMPLETED | OUTPATIENT
Start: 2021-01-26 | End: 2021-01-26

## 2021-01-26 RX ORDER — SODIUM CHLORIDE 9 MG/ML
20 INJECTION, SOLUTION INTRAVENOUS ONCE
Status: CANCELLED | OUTPATIENT
Start: 2021-01-26

## 2021-01-26 RX ADMIN — FERUMOXYTOL 510 MG: 510 INJECTION INTRAVENOUS at 13:48

## 2021-01-26 RX ADMIN — SODIUM CHLORIDE 20 ML/HR: 0.9 INJECTION, SOLUTION INTRAVENOUS at 13:48

## 2021-01-26 NOTE — PLAN OF CARE
Problem: Potential for Falls  Goal: Patient will remain free of falls  Description: INTERVENTIONS:  - Assess patient frequently for physical needs  -  Identify cognitive and physical deficits and behaviors that affect risk of falls    -  [place] fall precautions as indicated by assessment   - Educate patient/family on patient safety including physical limitations  - Instruct patient to call for assistance with activity based on assessment  - Modify environment to reduce risk of injury  - Consider OT/PT consult to assist with strengthening/mobility  Outcome: Progressing

## 2021-01-26 NOTE — PROGRESS NOTES
Pt  Denied new symptoms or concerns today  Fereheme tolerated well without adverse event  There are no future appointments scheduled at this time  Pt has follow up with physician   AVS provided

## 2021-01-28 ENCOUNTER — TELEPHONE (OUTPATIENT)
Dept: FAMILY MEDICINE CLINIC | Facility: CLINIC | Age: 66
End: 2021-01-28

## 2021-01-28 NOTE — TELEPHONE ENCOUNTER
FYIArdean Holstein from [facility] called to say that pt cancelled his appt w/nancy and is going to f/u w/Dr Beverly Velazquez

## 2021-01-30 ENCOUNTER — LAB (OUTPATIENT)
Dept: LAB | Facility: CLINIC | Age: 66
End: 2021-01-30
Payer: MEDICARE

## 2021-01-30 DIAGNOSIS — C18.7 ADENOCARCINOMA OF SIGMOID COLON (HCC): ICD-10-CM

## 2021-01-30 LAB — CEA SERPL-MCNC: 1.4 NG/ML (ref 0–3)

## 2021-01-30 PROCEDURE — 82378 CARCINOEMBRYONIC ANTIGEN: CPT

## 2021-01-30 PROCEDURE — 36415 COLL VENOUS BLD VENIPUNCTURE: CPT

## 2021-02-02 ENCOUNTER — TELEMEDICINE (OUTPATIENT)
Dept: ENDOCRINOLOGY | Facility: CLINIC | Age: 66
End: 2021-02-02
Payer: MEDICARE

## 2021-02-02 VITALS
BODY MASS INDEX: 28.52 KG/M2 | WEIGHT: 199.2 LBS | SYSTOLIC BLOOD PRESSURE: 137 MMHG | HEIGHT: 70 IN | DIASTOLIC BLOOD PRESSURE: 62 MMHG

## 2021-02-02 DIAGNOSIS — Z89.421 S/P AMPUTATION OF LESSER TOE, RIGHT (HCC): ICD-10-CM

## 2021-02-02 DIAGNOSIS — N18.4 TYPE 2 DIABETES MELLITUS WITH STAGE 4 CHRONIC KIDNEY DISEASE, WITH LONG-TERM CURRENT USE OF INSULIN (HCC): Primary | ICD-10-CM

## 2021-02-02 DIAGNOSIS — E11.22 TYPE 2 DIABETES MELLITUS WITH STAGE 4 CHRONIC KIDNEY DISEASE, WITH LONG-TERM CURRENT USE OF INSULIN (HCC): Primary | ICD-10-CM

## 2021-02-02 DIAGNOSIS — E11.40 TYPE 2 DIABETES MELLITUS WITH DIABETIC NEUROPATHY, WITHOUT LONG-TERM CURRENT USE OF INSULIN (HCC): ICD-10-CM

## 2021-02-02 DIAGNOSIS — Z79.4 TYPE 2 DIABETES MELLITUS WITH STAGE 4 CHRONIC KIDNEY DISEASE, WITH LONG-TERM CURRENT USE OF INSULIN (HCC): Primary | ICD-10-CM

## 2021-02-02 DIAGNOSIS — I12.9 HYPERTENSION WITH RENAL DISEASE: ICD-10-CM

## 2021-02-02 DIAGNOSIS — Z89.422 S/P AMPUTATION OF LESSER TOE, LEFT (HCC): ICD-10-CM

## 2021-02-02 DIAGNOSIS — G47.33 OSA (OBSTRUCTIVE SLEEP APNEA): ICD-10-CM

## 2021-02-02 PROBLEM — E11.621 DIABETIC FOOT ULCER (HCC): Status: RESOLVED | Noted: 2017-03-18 | Resolved: 2021-02-02

## 2021-02-02 PROBLEM — E16.2 HYPOGLYCEMIA: Status: RESOLVED | Noted: 2019-03-08 | Resolved: 2021-02-02

## 2021-02-02 PROBLEM — L97.509 DIABETIC FOOT ULCER (HCC): Status: RESOLVED | Noted: 2017-03-18 | Resolved: 2021-02-02

## 2021-02-02 PROCEDURE — 99214 OFFICE O/P EST MOD 30 MIN: CPT | Performed by: INTERNAL MEDICINE

## 2021-02-02 NOTE — PROGRESS NOTES
Virtual Regular Visit      Assessment/Plan:    Problem List Items Addressed This Visit        Endocrine    Type 2 diabetes mellitus with chronic kidney disease, with long-term current use of insulin (HCC) - Primary    Relevant Medications    Insulin Degludec-Liraglutide (Insulin Degludec-Liraglutide) 100 units-3 6 mg/mL injection pen    Type 2 diabetes mellitus with diabetic neuropathy, without long-term current use of insulin (HCC)    Relevant Medications    Insulin Degludec-Liraglutide (Insulin Degludec-Liraglutide) 100 units-3 6 mg/mL injection pen       Respiratory    NICOLASA (obstructive sleep apnea)       Cardiovascular and Mediastinum    Hypertension with renal disease       Other    S/P amputation of lesser toe, left ([address] )    S/P amputation of lesser toe, right ([address] )               Reason for visit is   Chief Complaint   Patient presents with    Virtual Regular Visit        Encounter provider Leah Silva MD    Provider located at [address] [place] [id number]      Recent Visits  No visits were found meeting these conditions  Showing recent visits within past 7 days and meeting all other requirements     Today's Visits  Date Type Provider Dept   02/02/21 Telemedicine Leah Silva MD [facility] [place] [address]   Showing today's visits and meeting all other requirements     Future Appointments  No visits were found meeting these conditions  Showing future appointments within next 150 days and meeting all other requirements        The patient was identified by name and date of birth  Todd Del Castillo was informed that this is a telemedicine visit and that the visit is being conducted through [facility] and patient was informed that this is a secure, HIPAA-compliant platform  He agrees to proceed     My office door was closed  No one else was in the room    He acknowledged consent and understanding of privacy and security of the video platform  The patient has agreed to participate and understands they can discontinue the visit at any time  Patient is aware this is a billable service  Subjective  Willard Brian is a 77 y o  male  With type 2 diabetes   49-year-old male with type 2 diabetes for many years presents for follow-up  His diabetes complicated by nephropathy, peripheral vascular disease, amputation and neuropathy  He is currently a glp one and basal insulin  He denies any hypo or hyperglycemia  He has an appointment with Ophthalmology in March of this year  He saw the podiatrist three weeks ago  For stage 4 chronic kidney disease, follows with Nephrology  For obstructive sleep apnea he uses CPAP  This appears to be well controlled  For hypertension, he is on medications  He denies any headaches  He takes statin for hyperlipidemia  He denies any myalgia         Past Medical History:   Diagnosis Date    Anemia     iron def    Arthritis     OA    Bruise of both arms     forearms and both hands    Bruises easily     CHF (congestive heart failure) (HCC)     Chronic kidney disease     CPAP (continuous positive airway pressure) dependence     Diabetes mellitus ([address] )     Diabetic foot ulcer ([facility])     Eczema     Erectile dysfunction     Gout     Hyperlipidemia     Hypertension     Hypoglycemia 3/8/2019    Murmur     Nephropathy     Osteoarthritis     PAD (peripheral artery disease) ([facility])     Seasonal allergies     Sleep apnea     Toe infection     bilat great toes    Vertigo     Walks frequently     Wears dentures     upper    Wears glasses        Past Surgical History:   Procedure Laterality Date    CARDIAC PACEMAKER PLACEMENT      CARPAL TUNNEL RELEASE Left     CARPAL TUNNEL RELEASE Right     CARPAL TUNNEL RELEASE      COLONOSCOPY  08/2020    FOOT AMPUTATION Left 2/10/2020    Procedure: PARTIAL 1ST RAY AMPUTATION;  Surgeon: Georgina Carranza DPM;  [facility];  Service: Podiatry    INCISION AND DRAINAGE OF WOUND Left 1/12/2020    Procedure: INCISION AND DRAINAGE (I&D) EXTREMITY AND REMOVAL OF SESMOID BONE;  Surgeon: Stacey Kirkpatrick DPM;  [facility];  Service: Podiatry    IR PICC LINE REPOSITION  1/14/2020    KNEE ARTHROSCOPY Left     KNEE ARTHROSCOPY Right     KNEE SURGERY      RI AMPUTATION TOE,I-P JT Bilateral 5/2/2017    Procedure: PARTIAL AMPUTATION RIGHT AND LEFT HALLUX ;  Surgeon: Georgina Carranza DPM;  [facility];  Service: Podiatry    RI AMPUTATION TOE,MT-P JT Left 7/25/2017    Procedure: 2ND TOE AMPUTATION;  Surgeon: Georgina Carranza DPM;  [facility];  Service: Podiatry    SHOULDER ARTHROSCOPY Left     with screws,RTC    SHOULDER SURGERY      TOE AMPUTATION Left 1/8/2020    Procedure: HALLUX AMPUTATION;  Surgeon: Stacey Kirkpatrick DPM;  [facility];  Service: Podiatry    UPPER GASTROINTESTINAL ENDOSCOPY  03/2020    Intestinal metaplasia prepyloric    VASECTOMY         Current Outpatient Medications   Medication Sig Dispense Refill    allopurinol (ZYLOPRIM) 300 mg tablet Take 1 tablet (300 mg total) by mouth every morning 90 tablet 1    amLODIPine (NORVASC) 10 mg tablet Take 1 tablet (10 mg total) by mouth daily 90 tablet 3    apixaban (ELIQUIS) 5 mg Take 1 tablet (5 mg total) by mouth every 12 (twelve) hours 60 tablet 11    betamethasone valerate (VALISONE) 0 1 % cream as needed   3    Cholecalciferol (VITAMIN D-3) 1000 units CAPS Take 1 capsule by mouth every morning        Dupilumab (Dupixent) 300 MG/2ML SOPN Inject 300 mg under the skin Once every 2 weeks      famotidine (PEPCID) 40 MG tablet Take 1 tablet (40 mg total) by mouth daily 90 tablet 3    Glucagon (Baqsimi Two Pack) 3 MG/DOSE POWD One inhalation to one nostril (Patient not taking: Reported on 12/22/2020) 1 each 2    Insulin Degludec-Liraglutide (Insulin Degludec-Liraglutide) 100 units-3 6 mg/mL injection pen Inject 19 Units under the skin daily 5 pen 3    Insulin Pen Needle (BD Pen Needle Zenaida U/F) 32G X 4 MM MISC Use 1 daily 200 each 3    metolazone (ZAROXOLYN) 5 mg tablet Take 1 tablet (5 mg total) by mouth see administration instructions Take every 6 days prn as directed by physician for wt gain/edema 5 tablet 5    metoprolol tartrate (LOPRESSOR) 50 mg tablet Take 1 tablet (50 mg total) by mouth every 12 (twelve) hours 180 tablet 3    Multiple Vitamin (MULTIVITAMIN) tablet Take 1 tablet by mouth daily IN AM      omega-3-acid ethyl esters (LOVAZA) 1 g capsule Take 2 capsules (2 g total) by mouth 2 (two) times a day Mail print prescription to pt 360 capsule 3    OneTouch Ultra test strip USE TO TEST BLOOD SUGAR 3 TIMES A  each 3    polyethylene glycol-electrolytes (NULYTELY) 4000 mL solution Take 4,000 mL by mouth once for 1 dose Follow instructions given at office visit 4000 mL 0    polyethylene glycol-electrolytes (TriLyte) 4000 mL solution Take 4,000 mL by mouth once for 1 dose (Patient not taking: Reported on 12/15/2020) 4000 mL 0    simvastatin (ZOCOR) 20 mg tablet Take 1 tablet (20 mg total) by mouth daily at bedtime 90 tablet 3    sodium bicarbonate 650 mg tablet Take 1 tablet (650 mg total) by mouth 2 (two) times daily after meals 180 tablet 1    tamsulosin (FLOMAX) 0 4 mg Take 1 capsule (0 4 mg total) by mouth daily with dinner 90 capsule 3    torsemide (DEMADEX) 20 mg tablet Take 1 tablet (20 mg total) by mouth 2 (two) times a day 180 tablet 3     No current facility-administered medications for this visit  Allergies   Allergen Reactions    Invokana [Canagliflozin] Other (See Comments)     Caused circulation problems    Lamisil [Terbinafine] Rash    Lamisil [Terbinafine] Blisters     Wife states " His skin peeled from head to toe"    Other Swelling     Pomegranate - facial swelling, no swelling of tongue, esophagus  Adhesive tape        Latex Rash       Review of Systems Constitutional: Negative for chills and fever  Respiratory: Negative for shortness of breath  Cardiovascular: Negative for chest pain  Gastrointestinal: Negative for constipation, diarrhea, nausea and vomiting  Endocrine: Negative for polydipsia and polyuria  All other systems reviewed and are negative  Video Exam    Vitals:    02/02/21 0958   BP: 137/62   Weight: 90 4 kg (199 lb 3 2 oz)   Height: 5' 10" (1 778 m)       Physical Exam  Constitutional:       General: He is not in acute distress  Appearance: He is well-developed  He is not diaphoretic  HENT:      Head: Normocephalic and atraumatic  Neck:      Musculoskeletal: Normal range of motion  Pulmonary:      Effort: Pulmonary effort is normal    Musculoskeletal: Normal range of motion  Neurological:      Mental Status: He is alert and oriented to person, place, and time  Psychiatric:         Behavior: Behavior normal           Assessment/plan:    1  Type 2 diabetes is well controlled based on hemoglobin A1c of 6%  Continue current therapy  2   Obstructive sleep apnea- continue CPAP  3  Hypertension seems to be under reasonable control  4  Stage 4 chronic kidney disease is managed by Nephrology  5  Complications of diabetes are stable  I spent 20 minutes directly with the patient during this visit      VIRTUAL VISIT DISCLAIMER    Jose Curiel acknowledges that he has consented to an online visit or consultation  He understands that the online visit is based solely on information provided by him, and that, in the absence of a face-to-face physical evaluation by the physician, the diagnosis he receives is both limited and provisional in terms of accuracy and completeness  This is not intended to replace a full medical face-to-face evaluation by the physician  Jose Curiel understands and accepts these terms

## 2021-02-05 ENCOUNTER — HOSPITAL ENCOUNTER (OUTPATIENT)
Dept: GASTROENTEROLOGY | Facility: HOSPITAL | Age: 66
Setting detail: OUTPATIENT SURGERY
Discharge: HOME | End: 2021-02-05
Attending: COLON & RECTAL SURGERY | Admitting: COLON & RECTAL SURGERY
Payer: MEDICARE

## 2021-02-05 ENCOUNTER — ANESTHESIA (OUTPATIENT)
Dept: GASTROENTEROLOGY | Facility: HOSPITAL | Age: 66
End: 2021-02-05

## 2021-02-05 ENCOUNTER — ANESTHESIA EVENT (OUTPATIENT)
Dept: GASTROENTEROLOGY | Facility: HOSPITAL | Age: 66
End: 2021-02-05

## 2021-02-05 VITALS
HEART RATE: 60 BPM | DIASTOLIC BLOOD PRESSURE: 67 MMHG | TEMPERATURE: 97.6 F | SYSTOLIC BLOOD PRESSURE: 145 MMHG | BODY MASS INDEX: 27.92 KG/M2 | WEIGHT: 195 LBS | RESPIRATION RATE: 18 BRPM | OXYGEN SATURATION: 96 % | HEIGHT: 70 IN

## 2021-02-05 VITALS — HEART RATE: 60 BPM

## 2021-02-05 DIAGNOSIS — K63.89 MASS OF COLON: ICD-10-CM

## 2021-02-05 PROCEDURE — 88305 TISSUE EXAM BY PATHOLOGIST: CPT | Performed by: PATHOLOGY

## 2021-02-05 PROCEDURE — 45390 COLONOSCOPY W/RESECTION: CPT | Performed by: COLON & RECTAL SURGERY

## 2021-02-05 PROCEDURE — 99204 OFFICE O/P NEW MOD 45 MIN: CPT | Performed by: COLON & RECTAL SURGERY

## 2021-02-05 PROCEDURE — 45381 COLONOSCOPY SUBMUCOUS NJX: CPT | Performed by: COLON & RECTAL SURGERY

## 2021-02-05 RX ORDER — PROPOFOL 10 MG/ML
INJECTION, EMULSION INTRAVENOUS AS NEEDED
Status: DISCONTINUED | OUTPATIENT
Start: 2021-02-05 | End: 2021-02-05

## 2021-02-05 RX ORDER — SODIUM CHLORIDE 9 MG/ML
INJECTION, SOLUTION INTRAVENOUS CONTINUOUS PRN
Status: DISCONTINUED | OUTPATIENT
Start: 2021-02-05 | End: 2021-02-05

## 2021-02-05 RX ADMIN — PROPOFOL 50 MG: 10 INJECTION, EMULSION INTRAVENOUS at 09:04

## 2021-02-05 RX ADMIN — PROPOFOL 50 MG: 10 INJECTION, EMULSION INTRAVENOUS at 09:10

## 2021-02-05 RX ADMIN — PROPOFOL 50 MG: 10 INJECTION, EMULSION INTRAVENOUS at 08:44

## 2021-02-05 RX ADMIN — PROPOFOL 100 MG: 10 INJECTION, EMULSION INTRAVENOUS at 08:42

## 2021-02-05 RX ADMIN — PROPOFOL 100 MG: 10 INJECTION, EMULSION INTRAVENOUS at 08:51

## 2021-02-05 RX ADMIN — SODIUM CHLORIDE: 0.9 INJECTION, SOLUTION INTRAVENOUS at 08:27

## 2021-02-05 RX ADMIN — PROPOFOL 50 MG: 10 INJECTION, EMULSION INTRAVENOUS at 09:07

## 2021-02-05 RX ADMIN — PROPOFOL 50 MG: 10 INJECTION, EMULSION INTRAVENOUS at 08:55

## 2021-02-05 RX ADMIN — PROPOFOL 100 MG: 10 INJECTION, EMULSION INTRAVENOUS at 08:47

## 2021-02-05 RX ADMIN — PROPOFOL 50 MG: 10 INJECTION, EMULSION INTRAVENOUS at 09:00

## 2021-02-05 RX ADMIN — SODIUM CHLORIDE: 0.9 INJECTION, SOLUTION INTRAVENOUS at 09:07

## 2021-02-05 NOTE — ANESTHESIA POSTPROCEDURE EVALUATION
Post-Op Assessment Note    CV Status:  Stable  Pain Score: 0    Pain management: adequate     Mental Status:  Alert and awake   Hydration Status:  Euvolemic and stable   PONV Controlled:  None   Airway Patency:  Patent      Post Op Vitals Reviewed: Yes      Staff: CRNA         No complications documented      /60 (02/05/21 0919)    Temp 97 6 °F (36 4 °C) (02/05/21 0919)    Pulse 61 (02/05/21 0919)   Resp 18 (02/05/21 0919)    SpO2 94 % (02/05/21 0919)

## 2021-02-05 NOTE — H&P
History and Physical   Colon and Rectal Surgery   Shaina Toribio 77 y o  male MRN: [id number]  Unit/Bed#:  Encounter: [id number]  02/05/21   8:31 AM      No chief complaint on file  ASSESSMENT:  Shaina Toribio is a 77 y o  male who presents for colonoscopy, for/fair prep  Sigmoid polyp with adenocarcinoma, high-grade dysplasia possibly to the margins, CEA normal 1 4, CT scan pending  We discussed repeat colonoscopy today  Colonoscopy,discussed in a face-to-face, personal, informed consent process, the benefits, alternatives, risks including not limited to bleeding, missed lesion, perforation requiring emergent surgery discussed/understood  PLAN:  Colonoscopy    History of Present Illness   HPI:  Shaina Toribio is a 77 y o  male who presents for colonoscopy, diagnostic, sigmoid polyp with adenocarcinoma prior for/fair prep, he has double prepped at this point  Denies nausea/vomiting/abdominal pain, change in bowel habits, rectal bleeding, or other constitutional symptoms       Historical Information   Past Medical History:   Diagnosis Date    Anemia     iron def    Arthritis     OA    Bruise of both arms     forearms and both hands    Bruises easily     CHF (congestive heart failure) (HCC)     Chronic kidney disease     CPAP (continuous positive airway pressure) dependence     Diabetes [facility]     Diabetic foot ulcer (HCC)     Eczema     Erectile dysfunction     Gout     Hyperlipidemia     Hypertension     Hypoglycemia 3/8/2019    Murmur     Nephropathy     Osteoarthritis     PAD (peripheral artery disease) (HCC)     Seasonal allergies     Sleep apnea     Toe infection     bilat great toes    Vertigo     Walks frequently     Wears dentures     upper    Wears glasses      Past Surgical History:   Procedure Laterality Date    CARDIAC PACEMAKER PLACEMENT      CARPAL TUNNEL RELEASE Left     CARPAL TUNNEL RELEASE Right     CARPAL TUNNEL RELEASE      COLONOSCOPY  08/2020    FOOT AMPUTATION Left 2/10/2020    Procedure: PARTIAL 1ST RAY AMPUTATION;  Surgeon: Judy Medellin DPM;  [facility];  Service: Podiatry    INCISION AND DRAINAGE OF WOUND Left 1/12/2020    Procedure: INCISION AND DRAINAGE (I&D) EXTREMITY AND REMOVAL OF SESMOID BONE;  Surgeon: Elsi Llanos DPM;  [facility];  Service: Podiatry    IR PICC LINE REPOSITION  1/14/2020    KNEE ARTHROSCOPY Left     KNEE ARTHROSCOPY Right     KNEE SURGERY      IN AMPUTATION TOE,I-P JT Bilateral 5/2/2017    Procedure: PARTIAL AMPUTATION RIGHT AND LEFT HALLUX ;  Surgeon: Judy Medellin DPM;  [facility];  Service: Podiatry    IN AMPUTATION TOE,MT-P JT Left 7/25/2017    Procedure: 2ND TOE AMPUTATION;  Surgeon: Judy Medellin DPM;  [facility];  Service: Podiatry    SHOULDER ARTHROSCOPY Left     with screws,RTC    SHOULDER SURGERY      TOE AMPUTATION Left 1/8/2020    Procedure: HALLUX AMPUTATION;  Surgeon: Elsi Llanos DPM;  [facility];  Service: Podiatry    UPPER GASTROINTESTINAL ENDOSCOPY  03/2020    Intestinal metaplasia prepyloric    VASECTOMY         Meds/Allergies     (Not in a hospital admission)        Current Outpatient Medications:     allopurinol (ZYLOPRIM) 300 mg tablet, Take 1 tablet (300 mg total) by mouth every morning, Disp: 90 tablet, Rfl: 1    amLODIPine (NORVASC) 10 mg tablet, Take 1 tablet (10 mg total) by mouth daily, Disp: 90 tablet, Rfl: 3    apixaban (ELIQUIS) 5 mg, Take 1 tablet (5 mg total) by mouth every 12 (twelve) hours, Disp: 60 tablet, Rfl: 11    Cholecalciferol (VITAMIN D-3) 1000 units CAPS, Take 1 capsule by mouth every morning  , Disp: , Rfl:     Dupilumab (Dupixent) 300 MG/2ML SOPN, Inject 300 mg under the skin Once every 2 weeks, Disp: , Rfl:     famotidine (PEPCID) 40 MG tablet, Take 1 tablet (40 mg total) by mouth daily, Disp: 90 tablet, Rfl: 3    Insulin Degludec-Liraglutide (Insulin Degludec-Liraglutide) 100 units-3 6 mg/mL injection pen, Inject 19 Units under the skin daily, Disp: 5 pen, Rfl: 3    Insulin Pen Needle (BD Pen Needle Zenaida U/F) 32G X 4 MM MISC, Use 1 daily, Disp: 200 each, Rfl: 3    metoprolol tartrate (LOPRESSOR) 50 mg tablet, Take 1 tablet (50 mg total) by mouth every 12 (twelve) hours, Disp: 180 tablet, Rfl: 3    Multiple Vitamin (MULTIVITAMIN) tablet, Take 1 tablet by mouth daily IN AM, Disp: , Rfl:     omega-3-acid ethyl esters (LOVAZA) 1 g capsule, Take 2 capsules (2 g total) by mouth 2 (two) times a day Mail print prescription to pt, Disp: 360 capsule, Rfl: 3    OneTouch Ultra test strip, USE TO TEST BLOOD SUGAR 3 TIMES A DAY, Disp: 100 each, Rfl: 3    simvastatin (ZOCOR) 20 mg tablet, Take 1 tablet (20 mg total) by mouth daily at bedtime, Disp: 90 tablet, Rfl: 3    sodium bicarbonate 650 mg tablet, Take 1 tablet (650 mg total) by mouth 2 (two) times daily after meals, Disp: 180 tablet, Rfl: 1    tamsulosin (FLOMAX) 0 4 mg, Take 1 capsule (0 4 mg total) by mouth daily with dinner, Disp: 90 capsule, Rfl: 3    torsemide (DEMADEX) 20 mg tablet, Take 1 tablet (20 mg total) by mouth 2 (two) times a day, Disp: 180 tablet, Rfl: 3    betamethasone valerate (VALISONE) 0 1 % cream, as needed , Disp: , Rfl: 3    Glucagon (Baqsimi Two Pack) 3 MG/DOSE POWD, One inhalation to one nostril (Patient not taking: Reported on 12/22/2020), Disp: 1 each, Rfl: 2    metolazone (ZAROXOLYN) 5 mg tablet, Take 1 tablet (5 mg total) by mouth see administration instructions Take every 6 days prn as directed by physician for wt gain/edema, Disp: 5 tablet, Rfl: 5    polyethylene glycol-electrolytes (NULYTELY) 4000 mL solution, Take 4,000 mL by mouth once for 1 dose Follow instructions given at office visit, Disp: 4000 mL, Rfl: 0    polyethylene glycol-electrolytes (TriLyte) 4000 mL solution, Take 4,000 mL by mouth once for 1 dose (Patient not taking: Reported on 12/15/2020), Disp: 4000 mL, Rfl: 0    Allergies   Allergen Reactions    Invokana [Canagliflozin] Other (See Comments)     Caused circulation problems    Lamisil [Terbinafine] Rash    Lamisil [Terbinafine] Blisters     Wife states " His skin peeled from head to toe"    Other Swelling     Pomegranate - facial swelling, no swelling of tongue, esophagus  Adhesive tape        Latex Rash         Social History   Social History     Substance and Sexual Activity   Alcohol Use No    Frequency: Never    Drinks per session: Patient refused    Binge frequency: Never     Social History     Substance and Sexual Activity   Drug Use No     Social History     Tobacco Use   Smoking Status Former Smoker    Packs/day: 0 25    Years: 20 00    Pack years: 5 00    Types: Cigarettes    Quit date:     Years since quittin 1   Smokeless Tobacco Never Used   Tobacco Comment    quit 10 years ago         Family History:   Family History   Problem Relation Age of Onset    Heart disease Father     Hypertension Father     Pulmonary embolism Father     Hypertension Mother          Objective     Current Vitals:   Blood Pressure: 163/73 (21)  Pulse: 62 (21)  Temperature: 98 7 °F (37 1 °C) (21)  Temp Source: Tympanic (21)  Respirations: 18 (21)  Height: 5' 10" (177 8 cm) (21)  Weight - Scale: 88 5 kg (195 lb) (21)  SpO2: 96 % (21)  No intake or output data in the 24 hours ending 21 0831    Physical Exam:  General:no distress  Eyes:perrla/eomi  ENT:moist mucus membranes  Neck:supple  Pulm:no increased work of breathing  CV:sinus  Abdomen:soft,nontender

## 2021-02-05 NOTE — ANESTHESIA PREPROCEDURE EVALUATION
Procedure:  COLONOSCOPY    Relevant Problems   CARDIO  ELBERT 1/2020: LVEF 60%, moderate MS, mild AS   (+) Complete heart block ([address] ) (s/p Pacemaker  Interrogated 12/2020)   (+) Hyperlipidemia   (+) Hypertension with renal disease   (+) Nonrheumatic aortic valve stenosis   (+) Other hyperlipidemia   (+) PAF (paroxysmal atrial fibrillation) ([facility])   (+) PVCs (premature ventricular contractions)   (+) Renovascular hypertension   (+) SSS (sick sinus syndrome) ([facility])   (+) Systolic murmur      ENDO   (+) Type 2 diabetes mellitus with chronic kidney disease, with long-term current use of insulin ([facility])   (+) Type 2 diabetes mellitus with diabetic neuropathy, without long-term current use of insulin (HCC)      /RENAL   (+) Acute renal failure (ARF) ([facility])   (+) Diabetic kidney disease (HCC)   (+) Stage 3 chronic kidney disease   (+) Stage 4 chronic kidney disease (HCC)   (+) Uremia      HEMATOLOGY   (+) Anemia   (+) Iron deficiency anemia      MUSCULOSKELETAL   (+) Gout   (+) Osteoarthritis      NEURO/PSYCH   (+) Anxiety      PULMONARY   (+) Acute respiratory failure with hypoxia (HCC)   (+) Chronic obstructive pulmonary disease (HCC)   (+) NICOLASA (obstructive sleep apnea)      Other   (+) Osteomyelitis of left foot ([facility])        Physical Exam    Airway    Mallampati score: II  TM Distance: >3 FB  Neck ROM: full     Dental   No notable dental hx     Cardiovascular      Pulmonary      Other Findings        Anesthesia Plan  ASA Score- 3     Anesthesia Type- IV sedation with anesthesia with ASA Monitors  Additional Monitors:   Airway Plan:     Comment: Back up GA  Plan Factors-Exercise tolerance (METS): >4 METS  Chart reviewed  Existing labs reviewed  Patient summary reviewed  Patient is not a current smoker  Obstructive sleep apnea risk education given perioperatively  Induction- intravenous  Postoperative Plan-     Informed Consent- Anesthetic plan and risks discussed with patient    I personally reviewed this patient with the CRNA  Discussed and agreed on the Anesthesia Plan with the ANTWON Roe

## 2021-02-10 ENCOUNTER — HOSPITAL ENCOUNTER (OUTPATIENT)
Dept: CT IMAGING | Facility: HOSPITAL | Age: 66
Discharge: HOME | End: 2021-02-10
Attending: COLON & RECTAL SURGERY
Payer: MEDICARE

## 2021-02-10 DIAGNOSIS — C18.7 ADENOCARCINOMA OF SIGMOID COLON (HCC): ICD-10-CM

## 2021-02-10 PROCEDURE — 74176 CT ABD & PELVIS W/O CONTRAST: CPT

## 2021-02-10 PROCEDURE — 71250 CT THORAX DX C-: CPT

## 2021-02-10 PROCEDURE — G1004 CDSM NDSC: HCPCS

## 2021-02-11 ENCOUNTER — DOCUMENTATION (OUTPATIENT)
Dept: HEMATOLOGY ONCOLOGY | Facility: CLINIC | Age: 66
End: 2021-02-11

## 2021-02-11 DIAGNOSIS — R93.5 ABNORMAL CT OF THE ABDOMEN: Primary | ICD-10-CM

## 2021-02-11 NOTE — PROGRESS NOTES
Rectal/GI Multidisciplinary Case Review date: 2/11/21    Presenting Doctor: Dr Dipti Cantu    Diagnosis: Verba Goltz was presented at the Rectal/GI Multidisciplinary Conference today  PHYSICIAN RECOMMENDED PLAN:    - PET/CT to further evaluate lymphadenopathy  - If PET is positive, consider IR consult for biopsy    - PET/CT scheduled for 2/19/21 at 1 PM at [facility]  Patient made aware and is agreeable to the plan  Team agreed to plan  NCCN guidelines were available for review during this meeting

## 2021-02-19 ENCOUNTER — HOSPITAL ENCOUNTER (OUTPATIENT)
Dept: NUCLEAR MEDICINE | Facility: HOSPITAL | Age: 66
Discharge: HOME | End: 2021-02-19
Payer: MEDICARE

## 2021-02-19 DIAGNOSIS — R93.5 ABNORMAL CT OF THE ABDOMEN: ICD-10-CM

## 2021-02-19 LAB — GLUCOSE SERPL-MCNC: 127 MG/DL (ref 65–140)

## 2021-02-19 PROCEDURE — G1004 CDSM NDSC: HCPCS

## 2021-02-19 PROCEDURE — A9552 F18 FDG: HCPCS

## 2021-02-19 PROCEDURE — 82948 REAGENT STRIP/BLOOD GLUCOSE: CPT

## 2021-02-19 PROCEDURE — 78815 PET IMAGE W/CT SKULL-THIGH: CPT

## 2021-02-24 ENCOUNTER — TELEPHONE (OUTPATIENT)
Dept: NEPHROLOGY | Facility: CLINIC | Age: 66
End: 2021-02-24

## 2021-02-24 DIAGNOSIS — R80.1 PERSISTENT PROTEINURIA: ICD-10-CM

## 2021-02-24 DIAGNOSIS — N18.4 STAGE 4 CHRONIC KIDNEY DISEASE (HCC): ICD-10-CM

## 2021-02-24 DIAGNOSIS — E87.2 METABOLIC ACIDOSIS: ICD-10-CM

## 2021-02-24 DIAGNOSIS — I12.9 HYPERTENSION WITH RENAL DISEASE: Primary | ICD-10-CM

## 2021-02-26 NOTE — TELEPHONE ENCOUNTER
I spoke to the patient and he is aware Dr Gerardo Romberg would like him to have some blood and urine tests to be done for the appointment

## 2021-03-03 ENCOUNTER — IN-CLINIC DEVICE VISIT (OUTPATIENT)
Dept: CARDIOLOGY CLINIC | Facility: CLINIC | Age: 66
End: 2021-03-03
Payer: MEDICARE

## 2021-03-03 ENCOUNTER — LAB (OUTPATIENT)
Dept: LAB | Facility: CLINIC | Age: 66
End: 2021-03-03
Payer: MEDICARE

## 2021-03-03 DIAGNOSIS — N18.4 STAGE 4 CHRONIC KIDNEY DISEASE (HCC): ICD-10-CM

## 2021-03-03 DIAGNOSIS — E87.2 METABOLIC ACIDOSIS: ICD-10-CM

## 2021-03-03 DIAGNOSIS — R80.1 PERSISTENT PROTEINURIA: ICD-10-CM

## 2021-03-03 DIAGNOSIS — I12.9 HYPERTENSION WITH RENAL DISEASE: ICD-10-CM

## 2021-03-03 DIAGNOSIS — Z95.0 PACEMAKER: Primary | ICD-10-CM

## 2021-03-03 LAB
ANION GAP SERPL CALCULATED.3IONS-SCNC: 12 MMOL/L (ref 4–13)
BASOPHILS # BLD AUTO: 0.07 THOUSANDS/ΜL (ref 0–0.1)
BASOPHILS NFR BLD AUTO: 1 % (ref 0–1)
BUN SERPL-MCNC: 53 MG/DL (ref 5–25)
CALCIUM SERPL-MCNC: 9.4 MG/DL (ref 8.3–10.1)
CHLORIDE SERPL-SCNC: 100 MMOL/L (ref 100–108)
CO2 SERPL-SCNC: 24 MMOL/L (ref 21–32)
CREAT SERPL-MCNC: 3.61 MG/DL (ref 0.6–1.3)
CREAT UR-MCNC: 54.9 MG/DL
EOSINOPHIL # BLD AUTO: 0.43 THOUSAND/ΜL (ref 0–0.61)
EOSINOPHIL NFR BLD AUTO: 5 % (ref 0–6)
ERYTHROCYTE [DISTWIDTH] IN BLOOD BY AUTOMATED COUNT: 18.2 % (ref 11.6–15.1)
GFR SERPL CREATININE-BSD FRML MDRD: 16 ML/MIN/1.73SQ M
GLUCOSE P FAST SERPL-MCNC: 149 MG/DL (ref 65–99)
HCT VFR BLD AUTO: 34.2 % (ref 36.5–49.3)
HGB BLD-MCNC: 11.2 G/DL (ref 12–17)
IMM GRANULOCYTES # BLD AUTO: 0.04 THOUSAND/UL (ref 0–0.2)
IMM GRANULOCYTES NFR BLD AUTO: 0 % (ref 0–2)
LYMPHOCYTES # BLD AUTO: 0.98 THOUSANDS/ΜL (ref 0.6–4.47)
LYMPHOCYTES NFR BLD AUTO: 11 % (ref 14–44)
MAGNESIUM SERPL-MCNC: 2.1 MG/DL (ref 1.6–2.6)
MCH RBC QN AUTO: 29.2 PG (ref 26.8–34.3)
MCHC RBC AUTO-ENTMCNC: 32.7 G/DL (ref 31.4–37.4)
MCV RBC AUTO: 89 FL (ref 82–98)
MONOCYTES # BLD AUTO: 0.69 THOUSAND/ΜL (ref 0.17–1.22)
MONOCYTES NFR BLD AUTO: 7 % (ref 4–12)
NEUTROPHILS # BLD AUTO: 7.13 THOUSANDS/ΜL (ref 1.85–7.62)
NEUTS SEG NFR BLD AUTO: 76 % (ref 43–75)
NRBC BLD AUTO-RTO: 0 /100 WBCS
PHOSPHATE SERPL-MCNC: 4.9 MG/DL (ref 2.3–4.1)
PLATELET # BLD AUTO: 286 THOUSANDS/UL (ref 149–390)
PMV BLD AUTO: 10 FL (ref 8.9–12.7)
POTASSIUM SERPL-SCNC: 4.2 MMOL/L (ref 3.5–5.3)
PROT UR-MCNC: 371 MG/DL
PROT/CREAT UR: 6.76 MG/G{CREAT} (ref 0–0.1)
PTH-INTACT SERPL-MCNC: 121.6 PG/ML (ref 18.4–80.1)
RBC # BLD AUTO: 3.84 MILLION/UL (ref 3.88–5.62)
SODIUM SERPL-SCNC: 136 MMOL/L (ref 136–145)
WBC # BLD AUTO: 9.34 THOUSAND/UL (ref 4.31–10.16)

## 2021-03-03 PROCEDURE — 93280 PM DEVICE PROGR EVAL DUAL: CPT | Performed by: INTERNAL MEDICINE

## 2021-03-03 PROCEDURE — 84100 ASSAY OF PHOSPHORUS: CPT

## 2021-03-03 PROCEDURE — 82570 ASSAY OF URINE CREATININE: CPT

## 2021-03-03 PROCEDURE — 84156 ASSAY OF PROTEIN URINE: CPT

## 2021-03-03 PROCEDURE — 85025 COMPLETE CBC W/AUTO DIFF WBC: CPT

## 2021-03-03 PROCEDURE — 83970 ASSAY OF PARATHORMONE: CPT

## 2021-03-03 PROCEDURE — 83735 ASSAY OF MAGNESIUM: CPT

## 2021-03-03 PROCEDURE — 80048 BASIC METABOLIC PNL TOTAL CA: CPT

## 2021-03-03 PROCEDURE — 36415 COLL VENOUS BLD VENIPUNCTURE: CPT

## 2021-03-03 NOTE — PROGRESS NOTES
Results for orders placed or performed in visit on 03/03/21   Cardiac EP device report    Narrative    MDT-DUAL CHAMBER PPM (AAIR-DDDR MODE)/ACTIVE SYSTEM IS MRI CONDITIONAL  DEVICE INTERROGATED IN THE [place] OFFICE/YEARLY  BATTERY ADEQUATE (5-9 7 YRS)  AP 45%;  100%  ALL LEAD PARAMETERS WITHIN NORMAL LIMITS  NO EPISODES  PT  TAKES ELIQUIS & METOPROLOL  NO PROGRAMMING CHANGES MADE TO DEVICE PARAMETERS  NORMAL DEVICE FUNCTION   PL

## 2021-03-04 ENCOUNTER — TELEPHONE (OUTPATIENT)
Dept: NEPHROLOGY | Facility: HOSPITAL | Age: 66
End: 2021-03-04

## 2021-03-04 NOTE — TELEPHONE ENCOUNTER
----- Message from Maricarmen Lopes DO sent at 3/4/2021  1:53 PM EST -----  Creatinine  trended back up, see if he feels okay, see if his weights are stable    If his weight is stable and he is not short of breath decreases torsemide  20 mg daily and repeat a BMP

## 2021-03-08 ENCOUNTER — OFFICE VISIT (OUTPATIENT)
Dept: NEPHROLOGY | Facility: CLINIC | Age: 66
End: 2021-03-08
Payer: MEDICARE

## 2021-03-08 VITALS
SYSTOLIC BLOOD PRESSURE: 158 MMHG | WEIGHT: 201.8 LBS | BODY MASS INDEX: 28.89 KG/M2 | HEIGHT: 70 IN | RESPIRATION RATE: 18 BRPM | DIASTOLIC BLOOD PRESSURE: 70 MMHG | HEART RATE: 64 BPM

## 2021-03-08 DIAGNOSIS — E11.69 TYPE 2 DIABETES MELLITUS WITH OTHER SPECIFIED COMPLICATION, WITHOUT LONG-TERM CURRENT USE OF INSULIN (HCC): ICD-10-CM

## 2021-03-08 DIAGNOSIS — N18.4 STAGE 4 CHRONIC KIDNEY DISEASE (HCC): ICD-10-CM

## 2021-03-08 DIAGNOSIS — E87.2 METABOLIC ACIDOSIS: ICD-10-CM

## 2021-03-08 DIAGNOSIS — I34.2 NONRHEUMATIC MITRAL VALVE STENOSIS: ICD-10-CM

## 2021-03-08 DIAGNOSIS — I15.0 RENOVASCULAR HYPERTENSION: ICD-10-CM

## 2021-03-08 DIAGNOSIS — E11.21 DIABETIC NEPHROPATHY ASSOCIATED WITH TYPE 2 DIABETES MELLITUS (HCC): ICD-10-CM

## 2021-03-08 DIAGNOSIS — I49.5 SSS (SICK SINUS SYNDROME) (HCC): ICD-10-CM

## 2021-03-08 DIAGNOSIS — E87.79 OTHER HYPERVOLEMIA: ICD-10-CM

## 2021-03-08 DIAGNOSIS — R80.1 PERSISTENT PROTEINURIA: ICD-10-CM

## 2021-03-08 DIAGNOSIS — E83.39 HYPERPHOSPHATEMIA: Primary | ICD-10-CM

## 2021-03-08 DIAGNOSIS — I70.1 LEFT RENAL ARTERY STENOSIS (HCC): ICD-10-CM

## 2021-03-08 PROCEDURE — 99214 OFFICE O/P EST MOD 30 MIN: CPT | Performed by: INTERNAL MEDICINE

## 2021-03-08 RX ORDER — CALCIUM ACETATE 667 MG/1
667 CAPSULE ORAL
Qty: 90 CAPSULE | Refills: 3 | Status: SHIPPED | OUTPATIENT
Start: 2021-03-08 | End: 2021-06-07 | Stop reason: SDUPTHER

## 2021-03-08 NOTE — PROGRESS NOTES
OFFICE follow-up- Nephrology   Cesar Webster 77 y o  male MRN: [id number]    Encounter: [id number]          ASSESSMENT and PLAN:  Maura Eaton was seen today for an initial consultation    He is 77years old with a past medical history of type 2 diabetes mellitus times 20 years, hypertension, gout  hyperlipidemia proteinuria and diabetic foot ulcer presents for follow-up    Diagnoses and all orders for this visit:    Essential (primary) hypertension  - blood pressures at home stable now around 284 systolic  - amlodipine 10 mg daily, metoprolol 50 mg every 12 hours, torsemide 20 mg twice daily with metolazone p r n   -secondary workup included a repeat renal artery ultrasound which does show a left renal artery stenosis  - follows with Cardiology as well regularly has moderate mitral valve stenosis and mild aortic stenosis  -overall will monitor and target a blood pressure of 130-140/60 which she is achieving at home   continue to monitor weight- down from October 2020 was 201 lb was 211 lb back in October  - off losartan because of recurrent hyperkalemia    Acute kidney injury on Diabetic kidney disease and persistent proteinuria/stage 4 chronic kidney disease  - in the setting of prolonged diabetes  -serologic workup in past has been negative  - creatinine now is progressively worsening  -nephrotic range proteinuria secondary to diabetic kidney disease likely not biopsy-proven  -episodes of acute interstitial nephritis with antibiotics  - creatinine is now  3 1-3 8 with an estimated GFR of 15-20 mL/minutes- consult for transplant evaluate  - would elect for PD currently asymptomatic will monitor numbers and symptoms and schedule PD catheter placement closer to the time  Dialysis is needed    Nephropathy-diabetic kidney disease  -serologies negative will monitor at this point    Gout  - on allopurinol 300 mg daily  - has been gout free, will monitor and continue    Bilateral leg edema  -continues to be present in the left leg more than the right with compression stockings it is stable will continue current dose of torsemide    Left renal artery stenosis and history of diastolic heart failure  -renal artery Doppler showed 60% stenosis  -can be indication and cause of flash pulmonary edema  -mitral valve disease also present  -diuresis stable and compensated    Left foot osteomyelitis, peripheral vascular disease  -status post amputation     continue monthly blood work and follow-up in 3 months      HPI:  Angelica Abarca is a 77 y o male who was referred by Dr Sybil pond  provider found for evaluation of  Initial evaluation    He is 77years old and has a history of type 2 diabetes mellitus currently on oral medications stage IIIA to 2 chronic kidney disease mitral valve prolapse,   he continues to feel well he denies any acute chest pain or shortness of breath no fevers or chills no nausea vomiting diarrhea constipation no foamy or bloody urine taking his medications without any difficulties receive the COVID vaccine and tolerated without any difficulty      ROS: All the systems were reviewed and were negative except as documented on the HPI      Allergies: Invokana [canagliflozin], Lamisil [terbinafine], Lamisil [terbinafine], Other, and Latex    Medications:   Current Outpatient Medications:     allopurinol (ZYLOPRIM) 300 mg tablet, Take 1 tablet (300 mg total) by mouth every morning, Disp: 90 tablet, Rfl: 1    amLODIPine (NORVASC) 10 mg tablet, Take 1 tablet (10 mg total) by mouth daily, Disp: 90 tablet, Rfl: 3    apixaban (ELIQUIS) 5 mg, Take 1 tablet (5 mg total) by mouth every 12 (twelve) hours, Disp: 60 tablet, Rfl: 11    betamethasone valerate (VALISONE) 0 1 % cream, as needed , Disp: , Rfl: 3    Cholecalciferol (VITAMIN D-3) 1000 units CAPS, Take 1 capsule by mouth every morning  , Disp: , Rfl:     Dupilumab (Dupixent) 300 MG/2ML SOPN, Inject 300 mg under the skin Once every 2 weeks, Disp: , Rfl:     famotidine (PEPCID) 40 MG tablet, Take 1 tablet (40 mg total) by mouth daily, Disp: 90 tablet, Rfl: 3    Insulin Degludec-Liraglutide (Insulin Degludec-Liraglutide) 100 units-3 6 mg/mL injection pen, Inject 19 Units under the skin daily, Disp: 5 pen, Rfl: 3    Insulin Pen Needle (BD Pen Needle Zenaida U/F) 32G X 4 MM MISC, Use 1 daily, Disp: 200 each, Rfl: 3    metolazone (ZAROXOLYN) 5 mg tablet, Take 1 tablet (5 mg total) by mouth see administration instructions Take every 6 days prn as directed by physician for wt gain/edema, Disp: 5 tablet, Rfl: 5    metoprolol tartrate (LOPRESSOR) 50 mg tablet, Take 1 tablet (50 mg total) by mouth every 12 (twelve) hours, Disp: 180 tablet, Rfl: 3    Multiple Vitamin (MULTIVITAMIN) tablet, Take 1 tablet by mouth daily IN AM, Disp: , Rfl:     omega-3-acid ethyl esters (LOVAZA) 1 g capsule, Take 2 capsules (2 g total) by mouth 2 (two) times a day Mail print prescription to pt, Disp: 360 capsule, Rfl: 3    OneTouch Ultra test strip, USE TO TEST BLOOD SUGAR 3 TIMES A DAY, Disp: 100 each, Rfl: 3    simvastatin (ZOCOR) 20 mg tablet, Take 1 tablet (20 mg total) by mouth daily at bedtime, Disp: 90 tablet, Rfl: 3    sodium bicarbonate 650 mg tablet, Take 1 tablet (650 mg total) by mouth 2 (two) times daily after meals, Disp: 180 tablet, Rfl: 1    tamsulosin (FLOMAX) 0 4 mg, Take 1 capsule (0 4 mg total) by mouth daily with dinner, Disp: 90 capsule, Rfl: 3    torsemide (DEMADEX) 20 mg tablet, Take 1 tablet (20 mg total) by mouth 2 (two) times a day, Disp: 180 tablet, Rfl: 3    calcium acetate (PHOSLO) capsule, Take 1 capsule (667 mg total) by mouth 3 (three) times a day with meals, Disp: 90 capsule, Rfl: 3    Glucagon (Baqsimi Two Pack) 3 MG/DOSE POWD, One inhalation to one nostril (Patient not taking: Reported on 12/22/2020), Disp: 1 each, Rfl: 2    polyethylene glycol-electrolytes (NULYTELY) 4000 mL solution, Take 4,000 mL by mouth once for 1 dose Follow instructions given at office visit, Disp: 4000 mL, Rfl: 0    polyethylene glycol-electrolytes (TriLyte) 4000 mL solution, Take 4,000 mL by mouth once for 1 dose (Patient not taking: Reported on 12/15/2020), Disp: 4000 mL, Rfl: 0    Past Medical History:   Diagnosis Date    Anemia     iron def    Arthritis     OA    Bruise of both arms     forearms and both hands    Bruises easily     [facility]     Chronic kidney disease     CPAP (continuous positive airway pressure) dependence     Diabetes mellitus [facility]     Diabetic foot ulcer [facility]     Eczema     Erectile dysfunction     Gout     Hyperlipidemia     Hypertension     Hypoglycemia 3/8/2019    Murmur     Nephropathy     Osteoarthritis     [facility]peripheral artery disease) ([facility]     Seasonal allergies     Sleep apnea     Toe infection     bilat great toes    Vertigo     Walks frequently     Wears dentures     upper    Wears glasses      Past Surgical History:   Procedure Laterality Date    CARDIAC PACEMAKER PLACEMENT      CARPAL TUNNEL RELEASE Left     CARPAL TUNNEL RELEASE Right     CARPAL TUNNEL RELEASE      COLONOSCOPY  08/2020    FOOT AMPUTATION Left 2/10/2020    Procedure: PARTIAL 1ST RAY AMPUTATION;  Surgeon: Nora Chapmna DPM;  [facility];  Service: Podiatry    INCISION AND DRAINAGE OF WOUND Left 1/12/2020    Procedure: INCISION AND DRAINAGE (I&D) EXTREMITY AND REMOVAL OF SESMOID BONE;  Surgeon: Juliocesar Craft DPM;  [facility];  Service: Podiatry    IR PICC LINE REPOSITION  1/14/2020    KNEE ARTHROSCOPY Left     KNEE ARTHROSCOPY Right     KNEE SURGERY      AZ AMPUTATION TOE,I-P JT Bilateral 5/2/2017    Procedure: PARTIAL AMPUTATION RIGHT AND LEFT HALLUX ;  Surgeon: Nora Chapman DPM;  [facility];  Service: Podiatry    AZ AMPUTATION TOE,MT-P JT Left 7/25/2017    Procedure: 2ND TOE AMPUTATION;  Surgeon: Nora Chapman DPM;  [facility];  Service: Podiatry    SHOULDER ARTHROSCOPY Left     with screws,RTC   [place] SHOULDER SURGERY      TOE AMPUTATION Left 1/8/2020    Procedure: Lauren Robledo;  Surgeon: Maxim Andrews DPM;  [facility];  Service: Podiatry    UPPER GASTROINTESTINAL ENDOSCOPY  03/2020    Intestinal metaplasia prepyloric    VASECTOMY       Family History   Problem Relation Age of Onset    Heart disease Father     Hypertension Father     Pulmonary embolism Father     Hypertension Mother       reports that he quit smoking about 11 years ago  His smoking use included cigarettes  He has a 5 00 pack-year smoking history  He has never used smokeless tobacco  He reports that he does not drink alcohol or use drugs  Physical Exam:   Vitals:    03/08/21 1022   BP: 158/70   BP Location: Left arm   Patient Position: Sitting   Cuff Size: Standard   Pulse: 64   Resp: 18   Weight: 91 5 kg (201 lb 12 8 oz)   Height: 5' 10" (1 778 m)     Body mass index is 28 96 kg/m²      General: conscious, cooperative, in no acute distress  Eyes: conjunctivae pink, anicteric sclerae  ENT: lips and mucous membranes moist  Neck: supple, no JVD  Chest: clear breath sounds bilateral, no crackles, ronchus or wheezings  CVS: normal rate, regular rhythm  Abdomen: soft, non-tender, non-distended, normoactive bowel sounds  Extremities:  2+ edema  Skin: no rash  Neuro: awake, alert, oriented  Psych:  Pleasant affect      Lab Results:     Results for orders placed or performed in visit on 81/60/81   Basic metabolic panel   Result Value Ref Range    Sodium 136 136 - 145 mmol/L    Potassium 4 2 3 5 - 5 3 mmol/L    Chloride 100 100 - 108 mmol/L    CO2 24 21 - 32 mmol/L    ANION GAP 12 4 - 13 mmol/L    BUN 53 (H) 5 - 25 mg/dL    Creatinine 3 61 (H) 0 60 - 1 30 mg/dL    Glucose, Fasting 149 (H) 65 - 99 mg/dL    Calcium 9 4 8 3 - 10 1 mg/dL    eGFR 16 ml/min/1 73sq m   CBC and differential   Result Value Ref Range    WBC 9 34 4 31 - 10 16 Thousand/uL    RBC 3 84 (L) 3 88 - 5 62 Million/uL    Hemoglobin 11 2 (L) 12 0 - 17 0 g/dL Hematocrit 34 2 (L) 36 5 - 49 3 %    MCV 89 82 - 98 fL    MCH 29 2 26 8 - 34 3 pg    MCHC 32 7 31 4 - 37 4 g/dL    RDW 18 2 (H) 11 6 - 15 1 %    MPV 10 0 8 9 - 12 7 fL    Platelets 211 772 - 127 Thousands/uL    nRBC 0 /100 WBCs    Neutrophils Relative 76 (H) 43 - 75 %    Immat GRANS % 0 0 - 2 %    Lymphocytes Relative 11 (L) 14 - 44 %    Monocytes Relative 7 4 - 12 %    Eosinophils Relative 5 0 - 6 %    Basophils Relative 1 0 - 1 %    Neutrophils Absolute 7 13 1 85 - 7 62 Thousands/µL    Immature Grans Absolute 0 04 0 00 - 0 20 Thousand/uL    Lymphocytes Absolute 0 98 0 60 - 4 47 Thousands/µL    Monocytes Absolute 0 69 0 17 - 1 22 Thousand/µL    Eosinophils Absolute 0 43 0 00 - 0 61 Thousand/µL    Basophils Absolute 0 07 0 00 - 0 10 Thousands/µL   Magnesium   Result Value Ref Range    Magnesium 2 1 1 6 - 2 6 mg/dL   Phosphorus   Result Value Ref Range    Phosphorus 4 9 (H) 2 3 - 4 1 mg/dL   PTH, intact   Result Value Ref Range     6 (H) 18 4 - 80 1 pg/mL   Protein / creatinine ratio, urine   Result Value Ref Range    Creatinine, Ur 54 9 mg/dL    Protein Urine Random 371 mg/dL    Prot/Creat Ratio, Ur 6 76 (H) 0 00 - 0 10       Echocardiogram 8/9/15  - left ventricle had an EF of 60% wall thickness was mildly increased with mild concentric hypertrophy and an abnormal left ventricular relaxation  - left atrium was mildly to moderately dilated  - right atrium was mildly dilated  - mitral valve shows mild annular calcification    Renal artery ultrasound 6/27/19  RIGHT RENAL:  No evidence of significant arterial occlusive disease in the main renal artery  Pulsatile renal vein  Renovascular resistive index of 0 8, indicative of parenchymal disease  Renal/Aorta Ratio: 1 81  The Kidney measures 13 cm  LEFT RENAL:  There is evidence of a >60% stenosis in the ostium and mid renal artery  Pulsatile renal vein  Renovascular resistive index of 0 9, indicative of parenchymal disease  Renal/Aorta Ratio: 2 4    The Kidney measures 13 9 cm  MESENTERIC:  There is evidence of a >70% stenosis in the celiac artery  Superior mesenteric artery is patent  Portions of the record may have been created with voice recognition software  Occasional wrong word or "sound a like" substitutions may have occurred due to the inherent limitations of voice recognition software  Read the chart carefully and recognize, using context, where substitutions have occurred  If you have any questions, please contact the dictating provider

## 2021-03-08 NOTE — PATIENT INSTRUCTIONS
Chronic Kidney Disease   WHAT YOU NEED TO KNOW:   Chronic kidney disease (CKD) is the gradual and permanent loss of kidney function  It is also called chronic kidney failure, or chronic renal insufficiency  Normally, the kidneys remove fluid, chemicals, and waste from your blood  These wastes are turned into urine by your kidneys  CKD may worsen over time and lead to kidney failure  Your CKD team will help you and your family plan for your care at home  The team will help you create goals and find ways to meet your goals  Your care plan may change over time as your needs change  DISCHARGE INSTRUCTIONS:   Call your local emergency number (911 in the [address]) if:   · You have a seizure  · You have shortness of breath  Call your doctor or nephrologist if:   · You are confused and very drowsy  · You suddenly gain or lose more weight than your healthcare provider has told you is okay  · You have itchy skin or a rash  · You urinate more or less than you normally do  · You have blood in your urine  · You have nausea and are vomiting  · You have fatigue or muscle weakness  · You have hiccups that will not stop  · You have questions or concerns about your condition or care  Medicines:   · Medicines  may be given to decrease blood pressure and get rid of extra fluid  You may also receive medicine to manage health conditions that may occur with CKD, such as anemia, diabetes, and heart disease  · Take your medicine as directed  Contact your healthcare provider if you think your medicine is not helping or if you have side effects  Tell him or her if you are allergic to any medicine  Keep a list of the medicines, vitamins, and herbs you take  Include the amounts, and when and why you take them  Bring the list or the pill bottles to follow-up visits  Carry your medicine list with you in case of an emergency  What you can do to manage CKD: Management may include making some lifestyle changes  Tell your healthcare provider if you have any concerns about being able to make the changes  He or she can help you find solutions, including working with specialists  Ask for help creating a plan to break large goals into smaller steps  Your plan may include any of the following:  · Manage other health conditions  Your healthcare provider will work with you to make a care plan that meets your needs  You will be checked regularly for heart disease or other conditions that can make CKD worse, such as diabetes  Your blood pressure will be closely monitored  You will also get a target blood pressure and help making a plan to reach your target  This may include taking your blood pressure at home  · Maintain a healthy weight  Extra weight can strain your kidneys  Ask what a healthy weight is for you  Your provider can help you create a weight loss plan if you are overweight  · Create an exercise plan  Regular exercise can help you manage CKD, high blood pressure, and diabetes  Exercise also helps control weight  Your provider can help you create exercise goals and a plan to reach those goals  For example, your goal may be to exercise for 30 minutes in a day  Your plan can include breaking exercise into 10 minute sessions, 3 times during the day  · Create a healthy eating plan  Your provider may tell you to eat food low in sodium (salt), potassium, phosphorus, or protein  A dietitian can help you plan meals if needed  Ask how much liquid to drink each day and which liquids are best for you  · Limit alcohol as directed  Alcohol can cause fluid retention and can affect your kidneys  Ask how much alcohol is safe for you  A drink of alcohol is 12 ounces of beer, 5 ounces of wine, or 1½ ounces of liquor  · Do not smoke  Nicotine and other chemicals in cigarettes and cigars can cause kidney damage  Ask your provider for information if you currently smoke and need help to quit   E-cigarettes or smokeless tobacco still contain nicotine  Talk to your provider before you use these products  · Ask about over-the-counter medicines  Medicines such as NSAIDs and laxatives may harm your kidneys  Some cough and cold medicines can raise your blood pressure  Always ask if a medicine is safe before you take it  · Ask about vaccines you may need  Infections such as pneumonia, influenza, and hepatitis can be more harmful or more likely to occur in a person who has CKD  Vaccines lower your risk for infection  Follow up with your doctor as directed: You will need to return for tests to monitor your kidney and nerve function, and your parathyroid hormone level  Your medicines may be changed, based on certain test results  You may also be referred to a nephrologist (kidney specialist)  Write down your questions so you remember to ask them during your visits  © Copyright [address] Information is for End User's use only and may not be sold, redistributed or otherwise used for commercial purposes  All illustrations and images included in CareNotes® are the copyrighted property of A D A M [employer] Inc  or [facility] Alexis Richard   The above information is an  only  It is not intended as medical advice for individual conditions or treatments  Talk to your doctor, nurse or pharmacist before following any medical regimen to see if it is safe and effective for you

## 2021-03-12 DIAGNOSIS — I50.32 CHRONIC DIASTOLIC (CONGESTIVE) HEART FAILURE (HCC): ICD-10-CM

## 2021-03-15 RX ORDER — TORSEMIDE 20 MG/1
20 TABLET ORAL 2 TIMES DAILY
Qty: 180 TABLET | Refills: 3
Start: 2021-03-15 | End: 2021-03-18 | Stop reason: SDUPTHER

## 2021-03-16 LAB
LEFT EYE DIABETIC RETINOPATHY: NORMAL
RIGHT EYE DIABETIC RETINOPATHY: NORMAL

## 2021-03-18 DIAGNOSIS — I50.32 CHRONIC DIASTOLIC (CONGESTIVE) HEART FAILURE (HCC): ICD-10-CM

## 2021-03-18 RX ORDER — TORSEMIDE 20 MG/1
20 TABLET ORAL 2 TIMES DAILY
Qty: 180 TABLET | Refills: 0
Start: 2021-03-18 | End: 2021-05-04

## 2021-03-26 ENCOUNTER — OFFICE VISIT (OUTPATIENT)
Dept: NEPHROLOGY | Facility: CLINIC | Age: 66
End: 2021-03-26
Payer: MEDICARE

## 2021-03-26 VITALS
DIASTOLIC BLOOD PRESSURE: 70 MMHG | BODY MASS INDEX: 29.98 KG/M2 | WEIGHT: 209.4 LBS | SYSTOLIC BLOOD PRESSURE: 150 MMHG | HEIGHT: 70 IN

## 2021-03-26 DIAGNOSIS — Z01.818 PRE-TRANSPLANT EVALUATION FOR CKD (CHRONIC KIDNEY DISEASE): Primary | ICD-10-CM

## 2021-03-26 PROCEDURE — 99215 OFFICE O/P EST HI 40 MIN: CPT | Performed by: INTERNAL MEDICINE

## 2021-03-26 NOTE — PROGRESS NOTES
Assessment   Satya Turpin is a 77 y o  male  referred by Dr Gerardo Romberg, with a past medical history of CKD with most recent creatinine 3 61 mg/dL on  with a GFR of 16, hypertension (20 years ago), diabetes (diagnosed 20 years ago), diabetic neuropathy, gout, hyperlipidemia, left renal artery stenosis, moderate mitral valve stenosis and mild aortic stenosis, permanent pacemaker placed in 2018 due to heart block, atrial fibrillation, retired  for [employer], former smoker for 20 years (less than 1 ppd) and quit 10 years ago, no ETOH, no drugs, seen in the [facility]rology [facility] for pre-transplant kidney evaluation  ESRD presumed to be due to diabetic nephropathy presumed    Prior slightly low C3 at 78, C4 normal at 18, GONZALO negative, Anca negative, anti double stranded DNA negative, anti GBM negative, rheumatoid factor negative    Echo in 2020-EF 55%, left atrium moderately dilated    Plan   1  Will not move forward with testing as of yet until case is reviewed with the transplant committee given colonoscopy report with adenocarcinoma with first colonoscopy I have messaged patient's colorectal team to review findings of nuclear scan also where there is also increased uptake on nuclear scan distal sigmoid colon site  Also paroxysmal AFib on Eliquis  History of valvular disease  Prior transfusion X 1 in 2020    2  The need to avoid blood transfusion to decrease allosensitization was explained to the patient  3  The advantages of a living donor over a  donor was explained to the patient and family  I strongly encouraged the patient and family to pursue a living donor  4  Diabetes-takes 19 units daily of degludec-liraglutide  5  History of diabetic foot ulcer-status post amputation  6  Colonoscopy records-sigmoid polyp with adenocarcinoma, high-grade dysplasia possibly to the margins    Following with Colorectal surgery and repeat colonoscopy completed in february  Repeat path on repeat colonoscopy with tubular adenoma but no sign of malignancy  There also appears to be FDG uptake on distal sigmoid colon on PET scan  7  Cardiac clearance-history of valvular disease with mitral and aortic valve  Also history of diastolic heart failure  History of complete heart block with permanent pacemaker  History of paroxysmal AFib  8  History of left renal artery stenosis  9  Paroxysmal atrial fibrillation-to note, patient is on Eliquis  10  Lung nodules-noted on CT scan in 2010  There is also mild retroperitoneal adenopathy on the CT scan  I have discussed with Rojelio Lantigua and family at length about the risk and benefits of kidney transplantation  I have strongly encouraged him to pursue living donation, and explained to him the benefits of living donation over  donor transplantation  We have briefly discussed the surgical procedure  We have discussed about the need for life long immunosuppression and the importance of compliance  We have discussed the side effects of immunosuppression including but not limited to infection, malignancies and developing/worsening diabetes control  Rojelio Lantigua verbalized understanding and is interested in pursuing transplant  During this visit, I spent 60 minutes with the patient; more than 50% of the time I counseled the patient regarding the risks and benefits of kidney transplantation, the immediate and long-term complications of kidney transplantation, and the advantage of receiving a living donor kidney transplant  It was a pleasure evaluating your patient in the office today  Thank you for allowing our team to participate in the care of Mr Rojelio Lantigua  Please do not hesitate to contact our team if further issues/questions shall arise in the interim  HISTORY OF PRESENT ILLNESS    Rojelio Lantigua is a 77 y o  male seen in the Nephrology Clinic for evaluation for kidney transplant      Renal disease is not biopsy proven  Primary Nephrologist is Dr Rachel Bunch  HD unit - n/a    Native Urine Output: yes  Hx of voiding difficulty: no  Hx of hematuria: no  Hx of proteinuria: yes  Hx of nephrolithiasis: no  Hx of recurrent urinary tract infection: no    HTN: onset 20 years ago,  hx of malignant HTN: no, admission for HTN emergencies: no    DM type 2: onset 18    Total insulin requirement/day 19 units daily of degludec-liraglutide  DM retinopathy: no, Laser Surgery: no  DM neuropathy: hands  DM gastroparesis: no  DKA: no  Hypoglycemic awareness: no  Hx of amputation: L TMA     PAD/PVD: no, Claudication: no  Cardiac history - CAD: no, MI: no     Primary Cardiologist: Dr Paresh Delgadillo    Exercise tolerance: walk    Hx of CVA: no    Hx of DVT/PE: no    Viral Infection:    HIV: no  Hep B: no  Hep C: no    Cancer: History of cancer: no    Health maintenance and other pertinent history:    Male:    Colonoscopy: yes  Prostate: no    Blood transfusion: yes    Last admission 3/2020    Review Of Systems:     Constitutional: no appetite  no fevers, chills, involuntary weight gain or weight loss  Eyes: no eye disease, double vision  ENT: no ear disease, epistaxis or oral ulcers  Respiratory: no SOB, cough, hemoptysis, HOROWITZ  Cardiovascular: no CP, palpitations, claudication, edema  GI: no N/V/D/C, abdominal pain, melena, BRBPR  : see HPI  Endocrine: no thyroid disease  Heme: no bleeding or clotting disorders or swollen lymph nodes  MS: no joint effusions or deformities    Skin: no skin disease  Neuro: no HA, seizures, numbness, tingling, focal weakness  Psych: no depression, anxiety    Lives with wife; daughter, daughter's  and their three children  - dog (2)    Employment history: retired;     HD Access: n/a  Abdominal Surgeries: no    Smoke: former  ETOH: no  Drugs: no    Past Medical History:   Diagnosis Date    Anemia     iron def    Arthritis     OA    Bruise of both arms     forearms and both hands    Bruises easily     [facility]est[facility] failure) [facility]     Chronic kidney disease     CPAP (continuous positive airway pressure) dependence     Diabetes mellitus ([facility] [address] )     Diabetic foot ulcer ([facility])     Eczema     Erectile dysfunction     Gout     Hyperlipidemia     Hypertension     Hypoglycemia 3/8/2019    Murmur     Nephropathy     Osteoarthritis     PAD (peripheral artery disease) ([facility])     Seasonal allergies     Sleep apnea     Toe infection     bilat great toes    Vertigo     Walks frequently     Wears dentures     upper    Wears glasses      Past Surgical History:   Procedure Laterality Date    CARDIAC PACEMAKER PLACEMENT      CARPAL TUNNEL RELEASE Left     CARPAL TUNNEL RELEASE Right     CARPAL TUNNEL RELEASE      COLONOSCOPY  08/2020    FOOT AMPUTATION Left 2/10/2020    Procedure: PARTIAL 1ST RAY AMPUTATION;  Surgeon: Kimberley Luevano DPM;  [facility];  Service: Podiatry    INCISION AND DRAINAGE OF WOUND Left 1/12/2020    Procedure: INCISION AND DRAINAGE (I&D) EXTREMITY AND REMOVAL OF SESMOID BONE;  Surgeon: Stacie Molina DPM;  [facility];  Service: Podiatry    IR PICC LINE REPOSITION  1/14/2020    KNEE ARTHROSCOPY Left     KNEE ARTHROSCOPY Right     KNEE SURGERY      DE AMPUTATION TOE,I-P JT Bilateral 5/2/2017    Procedure: PARTIAL AMPUTATION RIGHT AND LEFT HALLUX ;  Surgeon: Kimberley Luevano DPM;  [facility];  Service: Podiatry    DE AMPUTATION TOE,MT-P JT Left 7/25/2017    Procedure: 2ND TOE AMPUTATION;  Surgeon: Kimberley Luevano DPM;  [facility];  Service: Podiatry    SHOULDER ARTHROSCOPY Left     with screws,RTC    SHOULDER SURGERY      TOE AMPUTATION Left 1/8/2020    Procedure: HALLUX AMPUTATION;  Surgeon: Stacie Molina DPM;  [facility];  Service: Podiatry    UPPER GASTROINTESTINAL ENDOSCOPY  03/2020    Intestinal metaplasia prepyloric    VASECTOMY         Family History   Problem Relation Age of Onset    Heart disease Father     Hypertension Father     Pulmonary embolism Father     Hypertension Mother      Social History     Socioeconomic History    Marital status: /Civil Union     Spouse name: None    Number of children: None    Years of education: None    Highest education level: None   Occupational History    None   Social Needs    Financial resource strain: None    Food insecurity     Worry: None     Inability: None    Transportation needs     Medical: None     Non-medical: None   Tobacco Use    Smoking status: Former Smoker     Packs/day: 0 25     Years: 20 00     Pack years: 5 00     Types: Cigarettes     Quit date:      Years since quittin 2    Smokeless tobacco: Never Used    Tobacco comment: quit 10 years ago   Substance and Sexual Activity    Alcohol use: No     Frequency: Never     Drinks per session: Patient refused     Binge frequency: Never    Drug use: No    Sexual activity: Not Currently     Partners: Female   Lifestyle    Physical activity     Days per week: None     Minutes per session: None    Stress: None   Relationships    Social connections     Talks on phone: None     Gets together: None     Attends [employer] service: None     Active member of club or organization: None     Attends meetings of clubs or organizations: None     Relationship status: None    Intimate partner violence     Fear of current or ex partner: None     Emotionally abused: None     Physically abused: None     Forced sexual activity: None   Other Topics Concern    None   Social History Narrative    ** Merged History Encounter **         Daily cola consumption (2 cans/day)         Living donors:  has not discussed with family    Current Outpatient Medications   Medication Sig Dispense Refill    allopurinol (ZYLOPRIM) 300 mg tablet Take 1 tablet (300 mg total) by mouth every morning 90 tablet 1    amLODIPine (NORVASC) 10 mg tablet Take 1 tablet (10 mg total) by mouth daily 90 tablet 3    apixaban (ELIQUIS) 5 mg Take 1 tablet (5 mg total) by mouth every 12 (twelve) hours 60 tablet 11    betamethasone valerate (VALISONE) 0 1 % cream as needed   3    calcium acetate (PHOSLO) capsule Take 1 capsule (667 mg total) by mouth 3 (three) times a day with meals 90 capsule 3    Cholecalciferol (VITAMIN D-3) 1000 units CAPS Take 1 capsule by mouth every morning        Dupilumab (Dupixent) 300 MG/2ML SOPN Inject 300 mg under the skin Once every 2 weeks      famotidine (PEPCID) 40 MG tablet Take 1 tablet (40 mg total) by mouth daily 90 tablet 3    Insulin Degludec-Liraglutide (Insulin Degludec-Liraglutide) 100 units-3 6 mg/mL injection pen Inject 19 Units under the skin daily 5 pen 3    Insulin Pen Needle (BD Pen Needle Zenaida U/F) 32G X 4 MM MISC Use 1 daily 200 each 3    metolazone (ZAROXOLYN) 5 mg tablet Take 1 tablet (5 mg total) by mouth see administration instructions Take every 6 days prn as directed by physician for wt gain/edema 5 tablet 5    metoprolol tartrate (LOPRESSOR) 50 mg tablet Take 1 tablet (50 mg total) by mouth every 12 (twelve) hours 180 tablet 3    Multiple Vitamin (MULTIVITAMIN) tablet Take 1 tablet by mouth daily IN AM      omega-3-acid ethyl esters (LOVAZA) 1 g capsule Take 2 capsules (2 g total) by mouth 2 (two) times a day Mail print prescription to pt 360 capsule 3    OneTouch Ultra test strip USE TO TEST BLOOD SUGAR 3 TIMES A  each 3    simvastatin (ZOCOR) 20 mg tablet Take 1 tablet (20 mg total) by mouth daily at bedtime 90 tablet 3    sodium bicarbonate 650 mg tablet Take 1 tablet (650 mg total) by mouth 2 (two) times daily after meals 180 tablet 1    tamsulosin (FLOMAX) 0 4 mg Take 1 capsule (0 4 mg total) by mouth daily with dinner 90 capsule 3    torsemide (DEMADEX) 20 mg tablet Take 1 tablet (20 mg total) by mouth 2 (two) times a day 180 tablet 0    Glucagon (Baqsimi Two Pack) 3 MG/DOSE POWD One inhalation to one nostril (Patient not taking: Reported on 12/22/2020) 1 each 2    polyethylene glycol-electrolytes (NULYTELY) 4000 mL solution Take 4,000 mL by mouth once for 1 dose Follow instructions given at office visit 4000 mL 0    polyethylene glycol-electrolytes (TriLyte) 4000 mL solution Take 4,000 mL by mouth once for 1 dose (Patient not taking: Reported on 12/15/2020) 4000 mL 0     No current facility-administered medications for this visit        Invokana [canagliflozin], Lamisil [terbinafine], Lamisil [terbinafine], Other, and Latex    Physical Exam:    /70 (BP Location: Left arm, Patient Position: Sitting, Cuff Size: Large)   Ht 5' 10" (1 778 m)   Wt 95 kg (209 lb 6 4 oz)   BMI 30 05 kg/m²     General : NAD    HEENT:  anicteric  Cardiac:  RRR, S1, S2 normal,  no murmur    Lung:  CTAB   Abdomen:  soft, nontender, no ascites   HD access: n/a   femoral pulses ( no bruit) and DP/PT pulses  Diff to palpate  1+ edema   Neuro:no focal neuro deficits   Skin: tattoo no  Carotid bruit: no  Lymph: no LN- cervical, axillary, inguinal

## 2021-03-26 NOTE — LETTER
March 26, 2021     Knox Dakin, DO  [address]  [address] [address]    Patient: Pedro Gilbert   YOB: 1955   Date of Visit: 3/26/2021       Dear Dr Sonia Brooks: Thank you for referring Pedro Gilbert to me for evaluation  Below are my notes for this consultation  If you have questions, please do not hesitate to call me  I look forward to following your patient along with you  Sincerely,        Rashel eYh MD        CC: DO Rashel Ferreira MD  3/26/2021  1:59 PM  Sign when Signing Visit  Assessment   Pedro Gilbert is a 77 y o  male  referred by Dr Sonia Brooks, with a past medical history of CKD with most recent creatinine 3 61 mg/dL on March 3rd with a GFR of 16, hypertension (20 years ago), diabetes (diagnosed 20 years ago), diabetic neuropathy, gout, hyperlipidemia, left renal artery stenosis, moderate mitral valve stenosis and mild aortic stenosis, permanent pacemaker placed in November of 2018 due to heart block, atrial fibrillation, retired  for independenceIT, former smoker for 20 years (less than 1 ppd) and quit 10 years ago, no ETOH, no drugs, seen in the [facility]phrology [facility] for pre-transplant kidney evaluation  ESRD presumed to be due to diabetic nephropathy presumed    Prior slightly low C3 at 78, C4 normal at 18, GONZALO negative, Anca negative, anti double stranded DNA negative, anti GBM negative, rheumatoid factor negative    Echo in January of 2020-EF 55%, left atrium moderately dilated    Plan   1  Will not move forward with testing as of yet until case is reviewed with the transplant committee given colonoscopy report with adenocarcinoma, I have messaged patient's colorectal team to review findings of nuclear scan also where there is also increased uptake on nuclear scan distal sigmoid colon site  Also paroxysmal AFib on Eliquis  History of valvular disease  Prior transfusion X 1 in march 2020    2   The need to avoid blood transfusion to decrease allosensitization was explained to the patient  3  The advantages of a living donor over a  donor was explained to the patient and family  I strongly encouraged the patient and family to pursue a living donor  4  Diabetes-takes 19 units daily of degludec-liraglutide  5  History of diabetic foot ulcer-status post amputation  6  Colonoscopy records-sigmoid polyp with adenocarcinoma, high-grade dysplasia possibly to the margins  Following with Colorectal surgery and repeat colonoscopy completed in february  Repeat path on repeat colonoscopy with tubular adenoma but no sign of malignancy  There also appears to be FDG uptake on distal sigmoid colon on PET scan  7  Cardiac clearance-history of valvular disease with mitral and aortic valve  Also history of diastolic heart failure  History of complete heart block with permanent pacemaker  History of paroxysmal AFib  8  History of left renal artery stenosis  9  Paroxysmal atrial fibrillation-to note, patient is on Eliquis  10  Lung nodules-noted on CT scan in 2010  There is also mild retroperitoneal adenopathy on the CT scan  I have discussed with Sandra Mares and family at length about the risk and benefits of kidney transplantation  I have strongly encouraged him to pursue living donation, and explained to him the benefits of living donation over  donor transplantation  We have briefly discussed the surgical procedure  We have discussed about the need for life long immunosuppression and the importance of compliance  We have discussed the side effects of immunosuppression including but not limited to infection, malignancies and developing/worsening diabetes control  Sandra Mares verbalized understanding and is interested in pursuing transplant      During this visit, I spent 60 minutes with the patient; more than 50% of the time I counseled the patient regarding the risks and benefits of kidney transplantation, the immediate and long-term complications of kidney transplantation, and the advantage of receiving a living donor kidney transplant  It was a pleasure evaluating your patient in the office today  Thank you for allowing our team to participate in the care of Mr Huber Patel  Please do not hesitate to contact our team if further issues/questions shall arise in the interim  HISTORY OF PRESENT ILLNESS    Huber Patel is a 77 y o  male seen in the Nephrology Clinic for evaluation for kidney transplant  Renal disease is not biopsy proven  Primary Nephrologist is Dr Levon Estrada  HD unit - n/a    Native Urine Output: yes  Hx of voiding difficulty: no  Hx of hematuria: no  Hx of proteinuria: yes  Hx of nephrolithiasis: no  Hx of recurrent urinary tract infection: no    HTN: onset 20 years ago,  hx of malignant HTN: no, admission for HTN emergencies: no    DM type 2: onset 18    Total insulin requirement/day 19 units daily of degludec-liraglutide  DM retinopathy: no, Laser Surgery: no  DM neuropathy: hands  DM gastroparesis: no  DKA: no  Hypoglycemic awareness: no  Hx of amputation: L TMA     PAD/PVD: no, Claudication: no  Cardiac history - CAD: no, MI: no     Primary Cardiologist: Dr Shandra Caputo    Exercise tolerance: walk    Hx of CVA: no    Hx of DVT/PE: no    Viral Infection:    HIV: no  Hep B: no  Hep C: no    Cancer: History of cancer: no    Health maintenance and other pertinent history:    Male:    Colonoscopy: yes  Prostate: no    Blood transfusion: yes    Last admission 3/2020    Review Of Systems:     Constitutional: no appetite  no fevers, chills, involuntary weight gain or weight loss  Eyes: no eye disease, double vision  ENT: no ear disease, epistaxis or oral ulcers  Respiratory: no SOB, cough, hemoptysis, HOROWITZ  Cardiovascular: no CP, palpitations, claudication, edema  GI: no N/V/D/C, abdominal pain, melena, BRBPR    : see HPI  Endocrine: no thyroid disease  Heme: no bleeding or clotting disorders or swollen lymph nodes  MS: no joint effusions or deformities    Skin: no skin disease  Neuro: no HA, seizures, numbness, tingling, focal weakness  Psych: no depression, anxiety    Lives with wife; daughter, daughter's  and their three children  - dog (2)    Employment history: retired;     HD Access: n/a  Abdominal Surgeries: no    Smoke: former  ETOH: no  Drugs: no    Past Medical History:   Diagnosis Date    Anemia     iron def    Arthritis     OA    Bruise of both arms     forearms and both hands    Bruises easily     [facility]     Chronic kidney disease     CPAP (continuous positive airway pressure) dependence     Diabetes mellitus [facility]     [facility]     Eczema     Erectile dysfunction     Gout     Hyperlipidemia     Hypertension     Hypoglycemia 3/8/2019    Murmur     Nephropathy     Osteoarthritis     [facility]ipheral [facility]     Seasonal allergies     Sleep apnea     Toe infection     bilat great toes    Vertigo     Walks frequently     Wears dentures     upper    Wears glasses      Past Surgical History:   Procedure Laterality Date    CARDIAC PACEMAKER PLACEMENT      CARPAL TUNNEL RELEASE Left     CARPAL TUNNEL RELEASE Right     CARPAL TUNNEL RELEASE      COLONOSCOPY  08/2020    FOOT AMPUTATION Left 2/10/2020    Procedure: PARTIAL 1ST RAY AMPUTATION;  Surgeon: Kimberley Luevano DPM;  [facility];  Service: Podiatry    INCISION AND DRAINAGE OF WOUND Left 1/12/2020    Procedure: INCISION AND DRAINAGE (I&D) EXTREMITY AND REMOVAL OF SESMOID BONE;  Surgeon: Stacie Molina DPM;  [facility];  Service: Podiatry    IR PICC LINE REPOSITION  1/14/2020    KNEE ARTHROSCOPY Left     KNEE ARTHROSCOPY Right     KNEE SURGERY      AR AMPUTATION TOE,I-P JT Bilateral 5/2/2017    Procedure: PARTIAL AMPUTATION RIGHT AND LEFT HALLUX ;  Surgeon: Kimberley Luevano DPM;  [facility];  Service: Podiatry    NJ AMPUTATION TOE,MT-P JT Left 2017    Procedure: 2ND TOE AMPUTATION;  Surgeon: Nora Chapman DPM;  [facility];  Service: Podiatry    SHOULDER ARTHROSCOPY Left     with screws,RTC    SHOULDER SURGERY      TOE AMPUTATION Left 2020    Procedure: Bradley Ward;  Surgeon: Juliocesar Craft DPM;  [facility];  Service: Podiatry    UPPER GASTROINTESTINAL ENDOSCOPY  2020    Intestinal metaplasia prepyloric    VASECTOMY         Family History   Problem Relation Age of Onset    Heart disease Father     Hypertension Father     Pulmonary embolism Father     Hypertension Mother      Social History     Socioeconomic History    Marital status: /Civil Union     Spouse name: None    Number of children: None    Years of education: None    Highest education level: None   Occupational History    None   Social Needs    Financial resource strain: None    Food insecurity     Worry: None     Inability: None    Transportation needs     Medical: None     Non-medical: None   Tobacco Use    Smoking status: Former Smoker     Packs/day: 0 25     Years: 20 00     Pack years: 5 00     Types: Cigarettes     Quit date:      Years since quittin 2    Smokeless tobacco: Never Used    Tobacco comment: quit 10 years ago   Substance and Sexual Activity    Alcohol use: No     Frequency: Never     Drinks per session: Patient refused     Binge frequency: Never    Drug use: No    Sexual activity: Not Currently     Partners: Female   Lifestyle    Physical activity     Days per week: None     Minutes per session: None    Stress: None   Relationships    Social connections     Talks on phone: None     Gets together: None     Attends [employer] service: None     Active member of club or organization: None     Attends meetings of clubs or organizations: None     Relationship status: None    Intimate partner violence     Fear of current or ex partner: None Emotionally abused: None     Physically abused: None     Forced sexual activity: None   Other Topics Concern    None   Social History Narrative    ** Merged History Encounter **         Daily cola consumption (2 cans/day)         Living donors:  has not discussed with family    Current Outpatient Medications   Medication Sig Dispense Refill    allopurinol (ZYLOPRIM) 300 mg tablet Take 1 tablet (300 mg total) by mouth every morning 90 tablet 1    amLODIPine (NORVASC) 10 mg tablet Take 1 tablet (10 mg total) by mouth daily 90 tablet 3    apixaban (ELIQUIS) 5 mg Take 1 tablet (5 mg total) by mouth every 12 (twelve) hours 60 tablet 11    betamethasone valerate (VALISONE) 0 1 % cream as needed   3    calcium acetate (PHOSLO) capsule Take 1 capsule (667 mg total) by mouth 3 (three) times a day with meals 90 capsule 3    Cholecalciferol (VITAMIN D-3) 1000 units CAPS Take 1 capsule by mouth every morning        Dupilumab (Dupixent) 300 MG/2ML SOPN Inject 300 mg under the skin Once every 2 weeks      famotidine (PEPCID) 40 MG tablet Take 1 tablet (40 mg total) by mouth daily 90 tablet 3    Insulin Degludec-Liraglutide (Insulin Degludec-Liraglutide) 100 units-3 6 mg/mL injection pen Inject 19 Units under the skin daily 5 pen 3    Insulin Pen Needle (BD Pen Needle Zenaida U/F) 32G X 4 MM MISC Use 1 daily 200 each 3    metolazone (ZAROXOLYN) 5 mg tablet Take 1 tablet (5 mg total) by mouth see administration instructions Take every 6 days prn as directed by physician for wt gain/edema 5 tablet 5    metoprolol tartrate (LOPRESSOR) 50 mg tablet Take 1 tablet (50 mg total) by mouth every 12 (twelve) hours 180 tablet 3    Multiple Vitamin (MULTIVITAMIN) tablet Take 1 tablet by mouth daily IN AM      omega-3-acid ethyl esters (LOVAZA) 1 g capsule Take 2 capsules (2 g total) by mouth 2 (two) times a day Mail print prescription to pt 360 capsule 3    OneTouch Ultra test strip USE TO TEST BLOOD SUGAR 3 TIMES A  each 3    simvastatin (ZOCOR) 20 mg tablet Take 1 tablet (20 mg total) by mouth daily at bedtime 90 tablet 3    sodium bicarbonate 650 mg tablet Take 1 tablet (650 mg total) by mouth 2 (two) times daily after meals 180 tablet 1    tamsulosin (FLOMAX) 0 4 mg Take 1 capsule (0 4 mg total) by mouth daily with dinner 90 capsule 3    torsemide (DEMADEX) 20 mg tablet Take 1 tablet (20 mg total) by mouth 2 (two) times a day 180 tablet 0    Glucagon (Baqsimi Two Pack) 3 MG/DOSE POWD One inhalation to one nostril (Patient not taking: Reported on 12/22/2020) 1 each 2    polyethylene glycol-electrolytes (NULYTELY) 4000 mL solution Take 4,000 mL by mouth once for 1 dose Follow instructions given at office visit 4000 mL 0    polyethylene glycol-electrolytes (TriLyte) 4000 mL solution Take 4,000 mL by mouth once for 1 dose (Patient not taking: Reported on 12/15/2020) 4000 mL 0     No current facility-administered medications for this visit        Invokana [canagliflozin], Lamisil [terbinafine], Lamisil [terbinafine], Other, and Latex    Physical Exam:    /70 (BP Location: Left arm, Patient Position: Sitting, Cuff Size: Large)   Ht 5' 10" (1 778 m)   Wt 95 kg (209 lb 6 4 oz)   BMI 30 05 kg/m²     General : NAD    HEENT:  anicteric  Cardiac:  RRR, S1, S2 normal,  no murmur    Lung:  CTAB   Abdomen:  soft, nontender, no ascites   HD access: n/a   femoral pulses ( no bruit) and DP/PT pulses  Diff to palpate  1+ edema   Neuro:no focal neuro deficits   Skin: tattoo no  Carotid bruit: no  Lymph: no LN- cervical, axillary, inguinal

## 2021-03-26 NOTE — PATIENT INSTRUCTIONS
1) if you do not hear from [facility] in 4 weeks, please call our office   Thank you  2) we will see you yearly

## 2021-04-28 ENCOUNTER — TELEPHONE (OUTPATIENT)
Dept: NEPHROLOGY | Facility: CLINIC | Age: 66
End: 2021-04-28

## 2021-04-28 NOTE — TELEPHONE ENCOUNTER
Left message for patient reminding him of his appointment with Dr Nirmal Becerril on 5/4/21 and there is blood work to be done for the appointment

## 2021-04-30 ENCOUNTER — APPOINTMENT (OUTPATIENT)
Dept: LAB | Facility: CLINIC | Age: 66
End: 2021-04-30
Payer: MEDICARE

## 2021-04-30 DIAGNOSIS — R80.1 PERSISTENT PROTEINURIA: ICD-10-CM

## 2021-04-30 DIAGNOSIS — E11.69 TYPE 2 DIABETES MELLITUS WITH OTHER SPECIFIED COMPLICATION, WITHOUT LONG-TERM CURRENT USE OF INSULIN (HCC): ICD-10-CM

## 2021-04-30 DIAGNOSIS — N18.4 STAGE 4 CHRONIC KIDNEY DISEASE (HCC): ICD-10-CM

## 2021-04-30 DIAGNOSIS — E83.39 HYPERPHOSPHATEMIA: ICD-10-CM

## 2021-04-30 DIAGNOSIS — I70.1 LEFT RENAL ARTERY STENOSIS (HCC): ICD-10-CM

## 2021-04-30 LAB
ALBUMIN SERPL BCP-MCNC: 3.2 G/DL (ref 3.5–5)
ANION GAP SERPL CALCULATED.3IONS-SCNC: 5 MMOL/L (ref 4–13)
BACTERIA UR QL AUTO: NORMAL /HPF
BASOPHILS # BLD AUTO: 0.07 THOUSANDS/ΜL (ref 0–0.1)
BASOPHILS NFR BLD AUTO: 1 % (ref 0–1)
BILIRUB UR QL STRIP: NEGATIVE
BUN SERPL-MCNC: 45 MG/DL (ref 5–25)
CALCIUM ALBUM COR SERPL-MCNC: 10.5 MG/DL (ref 8.3–10.1)
CALCIUM SERPL-MCNC: 9.9 MG/DL (ref 8.3–10.1)
CHLORIDE SERPL-SCNC: 107 MMOL/L (ref 100–108)
CLARITY UR: CLEAR
CO2 SERPL-SCNC: 25 MMOL/L (ref 21–32)
COLOR UR: YELLOW
CREAT SERPL-MCNC: 3.24 MG/DL (ref 0.6–1.3)
CREAT UR-MCNC: 72.7 MG/DL
EOSINOPHIL # BLD AUTO: 0.52 THOUSAND/ΜL (ref 0–0.61)
EOSINOPHIL NFR BLD AUTO: 5 % (ref 0–6)
ERYTHROCYTE [DISTWIDTH] IN BLOOD BY AUTOMATED COUNT: 15.7 % (ref 11.6–15.1)
FERRITIN SERPL-MCNC: 102 NG/ML (ref 8–388)
GFR SERPL CREATININE-BSD FRML MDRD: 19 ML/MIN/1.73SQ M
GLUCOSE P FAST SERPL-MCNC: 158 MG/DL (ref 65–99)
GLUCOSE UR STRIP-MCNC: ABNORMAL MG/DL
HCT VFR BLD AUTO: 33.7 % (ref 36.5–49.3)
HGB BLD-MCNC: 11.3 G/DL (ref 12–17)
HGB UR QL STRIP.AUTO: ABNORMAL
HYALINE CASTS #/AREA URNS LPF: NORMAL /LPF
IMM GRANULOCYTES # BLD AUTO: 0.03 THOUSAND/UL (ref 0–0.2)
IMM GRANULOCYTES NFR BLD AUTO: 0 % (ref 0–2)
IRON SATN MFR SERPL: 18 %
IRON SERPL-MCNC: 60 UG/DL (ref 65–175)
KETONES UR STRIP-MCNC: NEGATIVE MG/DL
LEUKOCYTE ESTERASE UR QL STRIP: NEGATIVE
LYMPHOCYTES # BLD AUTO: 1.01 THOUSANDS/ΜL (ref 0.6–4.47)
LYMPHOCYTES NFR BLD AUTO: 10 % (ref 14–44)
MAGNESIUM SERPL-MCNC: 2.6 MG/DL (ref 1.6–2.6)
MCH RBC QN AUTO: 30.8 PG (ref 26.8–34.3)
MCHC RBC AUTO-ENTMCNC: 33.5 G/DL (ref 31.4–37.4)
MCV RBC AUTO: 92 FL (ref 82–98)
MONOCYTES # BLD AUTO: 0.89 THOUSAND/ΜL (ref 0.17–1.22)
MONOCYTES NFR BLD AUTO: 9 % (ref 4–12)
NEUTROPHILS # BLD AUTO: 7.76 THOUSANDS/ΜL (ref 1.85–7.62)
NEUTS SEG NFR BLD AUTO: 75 % (ref 43–75)
NITRITE UR QL STRIP: NEGATIVE
NON-SQ EPI CELLS URNS QL MICRO: NORMAL /HPF
NRBC BLD AUTO-RTO: 0 /100 WBCS
PH UR STRIP.AUTO: 7 [PH]
PHOSPHATE SERPL-MCNC: 5.4 MG/DL (ref 2.3–4.1)
PLATELET # BLD AUTO: 273 THOUSANDS/UL (ref 149–390)
PMV BLD AUTO: 9.6 FL (ref 8.9–12.7)
POTASSIUM SERPL-SCNC: 4.3 MMOL/L (ref 3.5–5.3)
PROT UR STRIP-MCNC: ABNORMAL MG/DL
PROT UR-MCNC: 601 MG/DL
PROT/CREAT UR: 8.27 MG/G{CREAT} (ref 0–0.1)
PTH-INTACT SERPL-MCNC: 70.9 PG/ML (ref 18.4–80.1)
RBC # BLD AUTO: 3.67 MILLION/UL (ref 3.88–5.62)
RBC #/AREA URNS AUTO: NORMAL /HPF
SODIUM SERPL-SCNC: 137 MMOL/L (ref 136–145)
SP GR UR STRIP.AUTO: 1.02 (ref 1–1.03)
TIBC SERPL-MCNC: 330 UG/DL (ref 250–450)
URATE SERPL-MCNC: 3.7 MG/DL (ref 4.2–8)
UROBILINOGEN UR QL STRIP.AUTO: 0.2 E.U./DL
WBC # BLD AUTO: 10.28 THOUSAND/UL (ref 4.31–10.16)
WBC #/AREA URNS AUTO: NORMAL /HPF

## 2021-04-30 PROCEDURE — 81001 URINALYSIS AUTO W/SCOPE: CPT | Performed by: INTERNAL MEDICINE

## 2021-04-30 PROCEDURE — 83735 ASSAY OF MAGNESIUM: CPT

## 2021-04-30 PROCEDURE — 80069 RENAL FUNCTION PANEL: CPT

## 2021-04-30 PROCEDURE — 83540 ASSAY OF IRON: CPT

## 2021-04-30 PROCEDURE — 82570 ASSAY OF URINE CREATININE: CPT | Performed by: INTERNAL MEDICINE

## 2021-04-30 PROCEDURE — 83970 ASSAY OF PARATHORMONE: CPT

## 2021-04-30 PROCEDURE — 36415 COLL VENOUS BLD VENIPUNCTURE: CPT

## 2021-04-30 PROCEDURE — 83550 IRON BINDING TEST: CPT

## 2021-04-30 PROCEDURE — 82728 ASSAY OF FERRITIN: CPT

## 2021-04-30 PROCEDURE — 84156 ASSAY OF PROTEIN URINE: CPT | Performed by: INTERNAL MEDICINE

## 2021-04-30 PROCEDURE — 84550 ASSAY OF BLOOD/URIC ACID: CPT

## 2021-04-30 PROCEDURE — 85025 COMPLETE CBC W/AUTO DIFF WBC: CPT

## 2021-05-03 ENCOUNTER — TELEPHONE (OUTPATIENT)
Dept: GASTROENTEROLOGY | Facility: CLINIC | Age: 66
End: 2021-05-03

## 2021-05-03 DIAGNOSIS — Z79.4 TYPE 2 DIABETES MELLITUS WITH STAGE 4 CHRONIC KIDNEY DISEASE, WITH LONG-TERM CURRENT USE OF INSULIN (HCC): Primary | ICD-10-CM

## 2021-05-03 DIAGNOSIS — E11.22 TYPE 2 DIABETES MELLITUS WITH STAGE 4 CHRONIC KIDNEY DISEASE, WITH LONG-TERM CURRENT USE OF INSULIN (HCC): Primary | ICD-10-CM

## 2021-05-03 DIAGNOSIS — N18.4 STAGE 4 CHRONIC KIDNEY DISEASE (HCC): ICD-10-CM

## 2021-05-03 DIAGNOSIS — N18.4 TYPE 2 DIABETES MELLITUS WITH STAGE 4 CHRONIC KIDNEY DISEASE, WITH LONG-TERM CURRENT USE OF INSULIN (HCC): Primary | ICD-10-CM

## 2021-05-03 DIAGNOSIS — D50.9 IRON DEFICIENCY ANEMIA, UNSPECIFIED IRON DEFICIENCY ANEMIA TYPE: Primary | ICD-10-CM

## 2021-05-03 NOTE — TELEPHONE ENCOUNTER
Pt wife nora called, they currently see Lionel sandoval they are switching providers and would like to see kimberley  I scheduled an appointment with them, wife would like to know if he sould still get the blood work done that was scheduled for them 1 month prior to appointment made   Please call wife back with an answer  [phone]

## 2021-05-04 ENCOUNTER — OFFICE VISIT (OUTPATIENT)
Dept: NEPHROLOGY | Facility: CLINIC | Age: 66
End: 2021-05-04
Payer: MEDICARE

## 2021-05-04 VITALS
SYSTOLIC BLOOD PRESSURE: 160 MMHG | BODY MASS INDEX: 29.92 KG/M2 | HEIGHT: 70 IN | RESPIRATION RATE: 18 BRPM | HEART RATE: 68 BPM | WEIGHT: 209 LBS | DIASTOLIC BLOOD PRESSURE: 72 MMHG

## 2021-05-04 DIAGNOSIS — N18.4 CKD (CHRONIC KIDNEY DISEASE), STAGE IV (HCC): ICD-10-CM

## 2021-05-04 DIAGNOSIS — I50.32 CHRONIC DIASTOLIC (CONGESTIVE) HEART FAILURE (HCC): ICD-10-CM

## 2021-05-04 DIAGNOSIS — N18.4 ANEMIA OF CHRONIC RENAL FAILURE, STAGE 4 (SEVERE) (HCC): Primary | ICD-10-CM

## 2021-05-04 DIAGNOSIS — M86.9 OSTEOMYELITIS OF LEFT FOOT, UNSPECIFIED TYPE (HCC): ICD-10-CM

## 2021-05-04 DIAGNOSIS — D63.1 ANEMIA OF CHRONIC RENAL FAILURE, STAGE 4 (SEVERE) (HCC): Primary | ICD-10-CM

## 2021-05-04 PROCEDURE — 99214 OFFICE O/P EST MOD 30 MIN: CPT | Performed by: INTERNAL MEDICINE

## 2021-05-04 RX ORDER — TORSEMIDE 20 MG/1
TABLET ORAL
Qty: 180 TABLET | Refills: 0
Start: 2021-05-04 | End: 2021-06-01 | Stop reason: SDUPTHER

## 2021-05-04 RX ORDER — METOPROLOL TARTRATE 50 MG/1
75 TABLET, FILM COATED ORAL EVERY 12 HOURS
Qty: 180 TABLET | Refills: 3 | Status: SHIPPED | OUTPATIENT
Start: 2021-05-04 | End: 2021-05-04

## 2021-05-04 RX ORDER — METOPROLOL TARTRATE 50 MG/1
50 TABLET, FILM COATED ORAL EVERY 12 HOURS
Qty: 180 TABLET | Refills: 3 | Status: SHIPPED | OUTPATIENT
Start: 2021-05-04

## 2021-05-04 NOTE — PROGRESS NOTES
OFFICE follow-up- Nephrology   Camila  77 y o  male MRN: [id number]    Encounter: [id number]          ASSESSMENT and PLAN:  Say Villafana was seen today for an initial consultation    He is 77years old with a past medical history of type 2 diabetes mellitus times 20 years, hypertension, gout  hyperlipidemia proteinuria and diabetic foot ulcer presents for follow-up    Diagnoses and all orders for this visit:    Essential (primary) hypertension  - blood pressures at home have been in the 348Y systolic/60-70  - he is currently on amlodipine 10 mg daily, metoprolol 50 mg twice daily, losartan was discontinued for ELZBIETA, and Hyperkalemia, now on torsemide 20 mg bid  -secondary workup included a repeat renal artery ultrasound which does show a left renal artery stenosis  - his echocardiogram was reviewed as well he should have further follow-up with cardiology to monitor his valvular heart disease- he does have LVH so blood pressure control is very important and this was discussed in detail with him in past  -overall will monitor and target a blood pressure of 140/60  Which bp has been higher than this  -Will increase his torsemide to 40 mg in am and 20 mg in PM-->weight is up by 10 lbs since February  And increasing LE edema with hx of diastolic HF, Unilateral ELIAZAR, CKD with nephrotic syndrome  Also will have him take a dose of metolazone tomorrow as well       Acute kidney injury on Diabetic kidney disease and persistent proteinuria/stage 4 chronic kidney disease  - in the setting of prolonged diabetes  -serologic workup in past has been negative  -it seems that his creatinine now is slightly lower at 3 2 this could be dilutional was higher in the 3 6 range  -nephritic range proteinuria secondary to diabetic kidney disease likely not biopsy-proven  -episodes of acute interstitial nephritis with antibiotics  -if dialysis is needed he would want peritoneal dialysis   -transplant evaluation underway    Nephropathy-diabetic kidney disease  -serologies negative will monitor at this point    Gout  - on allopurinol 300 mg daily  - has been gout free, will monitor and continue    Bilateral leg edema  -seems worse than previous  As above regarding medications    Left renal artery stenosis and history of diastolic heart failure  -renal artery Doppler showed 60% stenosis  -can be indication and cause of flash pulmonary edema  -mitral valve disease also present  -diuresis as above    Left foot osteomyelitis, peripheral vascular disease  -status post amputation    Iron deficiency Anemia  -CKD IV   -colonoscopy with sigmoid polyp, adenocarcinoma, high grade dysplaisa to margins, CEA normal-repeating colonscopy later in the year  -iron sat low with low ferritin, intolerant of oral iron, will schedule IV iron treatment    Repeat labs in 1 month and follow up in 3 months      HPI:  Emani Washington is a 77 y o male who was referred by Dr Sybil pond  provider found for evaluation of  Initial evaluation    He is 77years old and has a history of type 2 diabetes mellitus currently on oral medications stage IIIA to 2 chronic kidney disease mitral valve prolapse,    Overall he is feeling relatively well, his weight is, he denies any acute chest pain or shortness of breath fevers or chills nausea vomiting diarrhea or constipation, he notices that is extremity edema is worsening home his blood pressures have been 140-150/60-70 at, he had a recent colonoscopy with a sigmoid polyp and adenocarcinoma with high-grade dysplasia CEA was normal, getting repeat colonoscopy this summer      ROS: All the systems were reviewed and were negative except as documented on the HPI      Allergies: Invokana [canagliflozin], Lamisil [terbinafine], Lamisil [terbinafine], Other, and Latex    Medications:   Current Outpatient Medications:     allopurinol (ZYLOPRIM) 300 mg tablet, Take 1 tablet (300 mg total) by mouth every morning, Disp: 90 tablet, Rfl: 1    amLODIPine (NORVASC) 10 mg tablet, Take 1 tablet (10 mg total) by mouth daily, Disp: 90 tablet, Rfl: 3    apixaban (ELIQUIS) 5 mg, Take 1 tablet (5 mg total) by mouth every 12 (twelve) hours, Disp: 60 tablet, Rfl: 11    betamethasone valerate (VALISONE) 0 1 % cream, as needed , Disp: , Rfl: 3    calcium acetate (PHOSLO) capsule, Take 1 capsule (667 mg total) by mouth 3 (three) times a day with meals, Disp: 90 capsule, Rfl: 3    Cholecalciferol (VITAMIN D-3) 1000 units CAPS, Take 1 capsule by mouth every morning  , Disp: , Rfl:     Dupilumab (Dupixent) 300 MG/2ML SOPN, Inject 300 mg under the skin Once every 2 weeks, Disp: , Rfl:     famotidine (PEPCID) 40 MG tablet, Take 1 tablet (40 mg total) by mouth daily, Disp: 90 tablet, Rfl: 3    Insulin Degludec-Liraglutide (Insulin Degludec-Liraglutide) 100 units-3 6 mg/mL injection pen, Inject 19 Units under the skin daily, Disp: 5 pen, Rfl: 3    Insulin Pen Needle (BD Pen Needle Zenaida U/F) 32G X 4 MM MISC, Use 1 daily, Disp: 200 each, Rfl: 3    metolazone (ZAROXOLYN) 5 mg tablet, Take 1 tablet (5 mg total) by mouth see administration instructions Take every 6 days prn as directed by physician for wt gain/edema, Disp: 5 tablet, Rfl: 5    metoprolol tartrate (LOPRESSOR) 50 mg tablet, Take 1 tablet (50 mg total) by mouth every 12 (twelve) hours, Disp: 180 tablet, Rfl: 3    Multiple Vitamin (MULTIVITAMIN) tablet, Take 1 tablet by mouth daily IN AM, Disp: , Rfl:     omega-3-acid ethyl esters (LOVAZA) 1 g capsule, Take 2 capsules (2 g total) by mouth 2 (two) times a day Mail print prescription to pt, Disp: 360 capsule, Rfl: 3    OneTouch Ultra test strip, USE TO TEST BLOOD SUGAR 3 TIMES A DAY, Disp: 100 each, Rfl: 3    simvastatin (ZOCOR) 20 mg tablet, Take 1 tablet (20 mg total) by mouth daily at bedtime, Disp: 90 tablet, Rfl: 3    sodium bicarbonate 650 mg tablet, Take 1 tablet (650 mg total) by mouth 2 (two) times daily after meals, Disp: 180 tablet, Rfl: 1    tamsulosin (FLOMAX) 0 4 mg, Take 1 capsule (0 4 mg total) by mouth daily with dinner, Disp: 90 capsule, Rfl: 3    torsemide (DEMADEX) 20 mg tablet, 40 mg in AM and 20 mg afternoon, Disp: 180 tablet, Rfl: 0    Glucagon (Baqsimi Two Pack) 3 MG/DOSE POWD, One inhalation to one nostril (Patient not taking: Reported on 12/22/2020), Disp: 1 each, Rfl: 2    polyethylene glycol-electrolytes (NULYTELY) 4000 mL solution, Take 4,000 mL by mouth once for 1 dose Follow instructions given at office visit, Disp: 4000 mL, Rfl: 0    polyethylene glycol-electrolytes (TriLyte) 4000 mL solution, Take 4,000 mL by mouth once for 1 dose (Patient not taking: Reported on 12/15/2020), Disp: 4000 mL, Rfl: 0    Past Medical History:   Diagnosis Date    Anemia     iron def    Arthritis     OA    Bruise of both arms     forearms and both hands    Bruises easily     [facility]est[facility] failure[facility]     Chronic kidney disease     CPAP (continuous positive airway pressure) dependence     [facility] [address] [facility]     Diabetic foot ulcer [facility] [address] [facility]     Eczema     Erectile dysfunction     Gout     Hyperlipidemia     Hypertension     Hypoglycemia 3/8/2019    Murmur     Nephropathy     Osteoarthritis     PAD (peripheral artery disease) ([facility]     Seasonal allergies     Sleep apnea     Toe infection     bilat great toes    Vertigo     Walks frequently     Wears dentures     upper    Wears glasses      Past Surgical History:   Procedure Laterality Date    CARDIAC PACEMAKER PLACEMENT      CARPAL TUNNEL RELEASE Left     CARPAL TUNNEL RELEASE Right     CARPAL TUNNEL RELEASE      COLONOSCOPY  08/2020    FOOT AMPUTATION Left 2/10/2020    Procedure: PARTIAL 1ST RAY AMPUTATION;  Surgeon: Charity Garay DPM;  [facility];  Service: Podiatry    INCISION AND DRAINAGE OF WOUND Left 1/12/2020    Procedure: INCISION AND DRAINAGE (I&D) EXTREMITY AND REMOVAL OF SESMOID BONE;  Surgeon: Rachid López DPM;  [facility];  Service: Podiatry    IR PICC REPOSITION  1/14/2020    KNEE ARTHROSCOPY Left     KNEE ARTHROSCOPY Right     KNEE SURGERY      ME AMPUTATION TOE,I-P JT Bilateral 5/2/2017    Procedure: PARTIAL AMPUTATION RIGHT AND LEFT HALLUX ;  Surgeon: Carlon Dakin, DPM;  [facility];  Service: Podiatry    ME AMPUTATION TOE,MT-P JT Left 7/25/2017    Procedure: 2ND TOE AMPUTATION;  Surgeon: Carlon Dakin, DPM;  [facility];  Service: Podiatry    SHOULDER ARTHROSCOPY Left     with screws,RTC    SHOULDER SURGERY      TOE AMPUTATION Left 1/8/2020    Procedure: Mendel Lango;  Surgeon: Teri Richard DPM;  [facility];  Service: Podiatry    UPPER GASTROINTESTINAL ENDOSCOPY  03/2020    Intestinal metaplasia prepyloric    VASECTOMY       Family History   Problem Relation Age of Onset    Heart disease Father     Hypertension Father     Pulmonary embolism Father     Hypertension Mother       reports that he quit smoking about 11 years ago  His smoking use included cigarettes  He has a 5 00 pack-year smoking history  He has never used smokeless tobacco  He reports that he does not drink alcohol or use drugs  Physical Exam:   Vitals:    05/04/21 1200   BP: 160/72   BP Location: Left arm   Patient Position: Sitting   Cuff Size: Standard   Pulse: 68   Resp: 18   Weight: 94 8 kg (209 lb)   Height: 5' 10" (1 778 m)     Body mass index is 29 99 kg/m²      General: conscious, cooperative, in no acute distress  Eyes: conjunctivae pink, anicteric sclerae  ENT: lips and mucous membranes moist  Neck: supple, no JVD  Chest: clear breath sounds bilateral, no crackles, ronchus or wheezings  CVS: normal rate, regular rhythm  Abdomen: soft, non-tender, non-distended, normoactive bowel sounds  Extremities:  3+ edema  Skin: no rash  Neuro: awake, alert, oriented  Psych:  Pleasant affect      Lab Results:     Results for orders placed or performed in visit on 04/30/21   CBC and differential   Result Value Ref Range    WBC 10 28 (H) 4 31 - 10 16 Thousand/uL    RBC 3 67 (L) 3 88 - 5 62 Million/uL    Hemoglobin 11 3 (L) 12 0 - 17 0 g/dL    Hematocrit 33 7 (L) 36 5 - 49 3 %    MCV 92 82 - 98 fL    MCH 30 8 26 8 - 34 3 pg    MCHC 33 5 31 4 - 37 4 g/dL    RDW 15 7 (H) 11 6 - 15 1 %    MPV 9 6 8 9 - 12 7 fL    Platelets 335 631 - 826 Thousands/uL    nRBC 0 /100 WBCs    Neutrophils Relative 75 43 - 75 %    Immat GRANS % 0 0 - 2 %    Lymphocytes Relative 10 (L) 14 - 44 %    Monocytes Relative 9 4 - 12 %    Eosinophils Relative 5 0 - 6 %    Basophils Relative 1 0 - 1 %    Neutrophils Absolute 7 76 (H) 1 85 - 7 62 Thousands/µL    Immature Grans Absolute 0 03 0 00 - 0 20 Thousand/uL    Lymphocytes Absolute 1 01 0 60 - 4 47 Thousands/µL    Monocytes Absolute 0 89 0 17 - 1 22 Thousand/µL    Eosinophils Absolute 0 52 0 00 - 0 61 Thousand/µL    Basophils Absolute 0 07 0 00 - 0 10 Thousands/µL   Ferritin   Result Value Ref Range    Ferritin 102 8 - 388 ng/mL   Iron Saturation %   Result Value Ref Range    Iron Saturation 18 %    TIBC 330 250 - 450 ug/dL    Iron 60 (L) 65 - 175 ug/dL   Magnesium   Result Value Ref Range    Magnesium 2 6 1 6 - 2 6 mg/dL   PTH, intact   Result Value Ref Range    PTH 70 9 18 4 - 80 1 pg/mL   Renal function panel   Result Value Ref Range    Albumin 3 2 (L) 3 5 - 5 0 g/dL    Calcium 9 9 8 3 - 10 1 mg/dL    Corrected Calcium 10 5 (H) 8 3 - 10 1 mg/dL    Phosphorus 5 4 (H) 2 3 - 4 1 mg/dL    BUN 45 (H) 5 - 25 mg/dL    Creatinine 3 24 (H) 0 60 - 1 30 mg/dL    Sodium 137 136 - 145 mmol/L    Potassium 4 3 3 5 - 5 3 mmol/L    Chloride 107 100 - 108 mmol/L    CO2 25 21 - 32 mmol/L    ANION GAP 5 4 - 13 mmol/L    eGFR 19 ml/min/1 73sq m    Glucose, Fasting 158 (H) 65 - 99 mg/dL   Uric acid   Result Value Ref Range    Uric Acid 3 7 (L) 4 2 - 8 0 mg/dL       Echocardiogram 8/9/15  - left ventricle had an EF of 60% wall thickness was mildly increased with mild concentric hypertrophy and an abnormal left ventricular relaxation  - left atrium was mildly to moderately dilated  - right atrium was mildly dilated  - mitral valve shows mild annular calcification    Renal artery ultrasound 6/27/19  RIGHT RENAL:  No evidence of significant arterial occlusive disease in the main renal artery  Pulsatile renal vein  Renovascular resistive index of 0 8, indicative of parenchymal disease  Renal/Aorta Ratio: 1 81  The Kidney measures 13 cm  LEFT RENAL:  There is evidence of a >60% stenosis in the ostium and mid renal artery  Pulsatile renal vein  Renovascular resistive index of 0 9, indicative of parenchymal disease  Renal/Aorta Ratio: 2 4  The Kidney measures 13 9 cm  MESENTERIC:  There is evidence of a >70% stenosis in the celiac artery  Superior mesenteric artery is patent  Portions of the record may have been created with voice recognition software  Occasional wrong word or "sound a like" substitutions may have occurred due to the inherent limitations of voice recognition software  Read the chart carefully and recognize, using context, where substitutions have occurred  If you have any questions, please contact the dictating provider

## 2021-05-04 NOTE — TELEPHONE ENCOUNTER
Called patient's wife back in regards to labs she wanted to know if they should have completed  I do see that recent labs were completed 1/11/2021 but I don't see that any other labs were put in for the patient  Will discuss with the provider

## 2021-05-04 NOTE — PATIENT INSTRUCTIONS
Please increase torsemide to 40 mg in am and 20 mg PM  Please take one dose of metalazone tomorrow  Continue to check your weights daily  Check blood work in 1 month and then three months

## 2021-05-05 NOTE — TELEPHONE ENCOUNTER
I have ordered relevant labs  He does not need to have these completed until right before he sees me in August  Thank you

## 2021-05-12 DIAGNOSIS — N18.4 STAGE 4 CHRONIC KIDNEY DISEASE (HCC): ICD-10-CM

## 2021-05-12 DIAGNOSIS — D50.9 IRON DEFICIENCY ANEMIA, UNSPECIFIED IRON DEFICIENCY ANEMIA TYPE: Primary | ICD-10-CM

## 2021-05-13 RX ORDER — SODIUM CHLORIDE 9 MG/ML
20 INJECTION, SOLUTION INTRAVENOUS ONCE
Status: CANCELLED | OUTPATIENT
Start: 2021-05-13

## 2021-05-14 ENCOUNTER — TELEPHONE (OUTPATIENT)
Dept: NEPHROLOGY | Facility: HOSPITAL | Age: 66
End: 2021-05-14

## 2021-05-14 RX ORDER — SODIUM CHLORIDE 9 MG/ML
20 INJECTION, SOLUTION INTRAVENOUS ONCE
Status: CANCELLED | OUTPATIENT
Start: 2021-05-20

## 2021-05-14 NOTE — TELEPHONE ENCOUNTER
I spoke to the patient and informed him his first infusion is at the [facility] 5/20/21 at 2pm  He also has a second [facility] infusion set up for 5/27/21 at 2pm  I am initiating a prior auth request through Al[facility] Boyer, please update the dates on the [facility] orders  Thank you

## 2021-05-18 DIAGNOSIS — I50.32 CHRONIC DIASTOLIC (CONGESTIVE) HEART FAILURE (HCC): ICD-10-CM

## 2021-05-18 RX ORDER — METOLAZONE 5 MG/1
TABLET ORAL
Qty: 5 TABLET | Refills: 5 | OUTPATIENT
Start: 2021-05-18

## 2021-05-20 ENCOUNTER — HOSPITAL ENCOUNTER (OUTPATIENT)
Dept: INFUSION CENTER | Facility: HOSPITAL | Age: 66
Discharge: HOME | End: 2021-05-20
Attending: INTERNAL MEDICINE
Payer: MEDICARE

## 2021-05-20 VITALS — TEMPERATURE: 98.9 F

## 2021-05-20 DIAGNOSIS — N18.4 STAGE 4 CHRONIC KIDNEY DISEASE (HCC): ICD-10-CM

## 2021-05-20 DIAGNOSIS — D50.9 IRON DEFICIENCY ANEMIA, UNSPECIFIED IRON DEFICIENCY ANEMIA TYPE: Primary | ICD-10-CM

## 2021-05-20 PROCEDURE — 96365 THER/PROPH/DIAG IV INF INIT: CPT

## 2021-05-20 RX ORDER — SODIUM CHLORIDE 9 MG/ML
20 INJECTION, SOLUTION INTRAVENOUS ONCE
Status: COMPLETED | OUTPATIENT
Start: 2021-05-20 | End: 2021-05-20

## 2021-05-20 RX ORDER — SODIUM CHLORIDE 9 MG/ML
20 INJECTION, SOLUTION INTRAVENOUS ONCE
Status: CANCELLED | OUTPATIENT
Start: 2021-05-27

## 2021-05-20 RX ADMIN — FERUMOXYTOL 510 MG: 510 INJECTION INTRAVENOUS at 14:44

## 2021-05-20 RX ADMIN — SODIUM CHLORIDE 20 ML/HR: 0.9 INJECTION, SOLUTION INTRAVENOUS at 14:15

## 2021-05-20 NOTE — PLAN OF CARE
Problem: Potential for Falls  Goal: Patient will remain free of falls  Description: INTERVENTIONS:  - Assess patient frequently for physical needs  -  Identify cognitive and physical deficits and behaviors that affect risk of falls    -  [place] fall precautions as indicated by assessment   - Educate patient/family on patient safety including physical limitations  - Instruct patient to call for assistance with activity based on assessment  - Modify environment to reduce risk of injury  - Consider OT/PT consult to assist with strengthening/mobility  Outcome: Progressing

## 2021-05-21 ENCOUNTER — OFFICE VISIT (OUTPATIENT)
Dept: PULMONOLOGY | Facility: CLINIC | Age: 66
End: 2021-05-21
Payer: MEDICARE

## 2021-05-21 VITALS
WEIGHT: 209 LBS | SYSTOLIC BLOOD PRESSURE: 140 MMHG | BODY MASS INDEX: 29.92 KG/M2 | TEMPERATURE: 96.9 F | OXYGEN SATURATION: 91 % | HEART RATE: 76 BPM | DIASTOLIC BLOOD PRESSURE: 70 MMHG | HEIGHT: 70 IN

## 2021-05-21 DIAGNOSIS — G47.33 OSA (OBSTRUCTIVE SLEEP APNEA): Primary | ICD-10-CM

## 2021-05-21 DIAGNOSIS — E11.69 TYPE 2 DIABETES MELLITUS WITH OTHER SPECIFIED COMPLICATION, WITHOUT LONG-TERM CURRENT USE OF INSULIN (HCC): ICD-10-CM

## 2021-05-21 DIAGNOSIS — M10.9 GOUT, UNSPECIFIED CAUSE, UNSPECIFIED CHRONICITY, UNSPECIFIED SITE: ICD-10-CM

## 2021-05-21 PROCEDURE — 99213 OFFICE O/P EST LOW 20 MIN: CPT | Performed by: INTERNAL MEDICINE

## 2021-05-21 RX ORDER — ALLOPURINOL 300 MG/1
TABLET ORAL
Qty: 90 TABLET | Refills: 1 | Status: SHIPPED | OUTPATIENT
Start: 2021-05-21 | End: 2021-12-04 | Stop reason: SDUPTHER

## 2021-05-21 NOTE — PROGRESS NOTES
Office Progress Note - Pulmonary    Shane Welch 77 y o  male MRN: [id number]    Encounter: [id number]      Assessment:   Obstructive sleep apnea   Essential hypertension   Chronic kidney disease   Two diabetes mellitus   Mild aortic stenosis  Plan:     Nocturnal CPAP   Follow-up in 1 year  Discussion:   The patient's obstructive sleep apnea is very well treated  He is compliant with the nocturnal CPAP  I will see him in 1 year for follow-up  Subjective: The patient is here for follow-up visit  He is very happy that he has the CPAP machine  He felt he never slept before he started using the CPAP  He is using it every night  He has no issues with the machine  He is very alert and awake when he wakes up in the morning  Denies daytime hypersomnolence  Denies shortness of breath even on exertion  Denies cough, wheezing or sputum production  Denies chest pain  Review of systems:  A 12 point system review is done and aside from what is stated above the rest of the review of systems is negative  Family history and social history are reviewed  Medications list is reviewed  Vitals: Blood pressure 140/70, pulse 76, temperature (!) 96 9 °F (36 1 °C), temperature source Tympanic, height 5' 10" (1 778 m), weight 94 8 kg (209 lb), SpO2 91 %  ,     Physical Exam  Gen: Awake, alert, oriented x 3, no acute distress  HEENT: Mucous membranes moist, no oral lesions, no thrush  NECK: No accessory muscle use, JVP not elevated  Cardiac: Regular, single S1, single S2   2/6 ejection systolic murmur  Lungs:  Clear breath sounds  No wheezing or rhonchi  Abdomen: normoactive bowel sounds, soft nontender, nondistended, no rebound or rigidity, no guarding  Extremities: no cyanosis, no clubbing, no edema  Neuro:  Grossly nonfocal   Skin:  No rash      Lab Results   Component Value Date    WBC 10 28 (H) 04/30/2021    HGB 11 3 (L) 04/30/2021    HCT 33 7 (L) 04/30/2021    MCV 92 04/30/2021    PLT 273 04/30/2021     Lab Results   Component Value Date    SODIUM 137 04/30/2021    K 4 3 04/30/2021     04/30/2021    CO2 25 04/30/2021    BUN 45 (H) 04/30/2021    CREATININE 3 24 (H) 04/30/2021    GLUC 69 03/29/2020    CALCIUM 9 9 04/30/2021

## 2021-05-24 ENCOUNTER — HOSPITAL ENCOUNTER (OUTPATIENT)
Dept: NON INVASIVE DIAGNOSTICS | Facility: HOSPITAL | Age: 66
Discharge: HOME | End: 2021-05-24
Attending: INTERNAL MEDICINE
Payer: MEDICARE

## 2021-05-24 DIAGNOSIS — I35.0 NONRHEUMATIC AORTIC VALVE STENOSIS: ICD-10-CM

## 2021-05-24 DIAGNOSIS — I34.2 NONRHEUMATIC MITRAL VALVE STENOSIS: ICD-10-CM

## 2021-05-24 PROCEDURE — 93306 TTE W/DOPPLER COMPLETE: CPT | Performed by: INTERNAL MEDICINE

## 2021-05-24 PROCEDURE — 93306 TTE W/DOPPLER COMPLETE: CPT

## 2021-05-27 ENCOUNTER — HOSPITAL ENCOUNTER (OUTPATIENT)
Dept: INFUSION CENTER | Facility: HOSPITAL | Age: 66
Discharge: HOME | End: 2021-05-27
Attending: INTERNAL MEDICINE
Payer: MEDICARE

## 2021-05-27 VITALS
DIASTOLIC BLOOD PRESSURE: 72 MMHG | HEART RATE: 80 BPM | TEMPERATURE: 99 F | RESPIRATION RATE: 18 BRPM | SYSTOLIC BLOOD PRESSURE: 150 MMHG

## 2021-05-27 DIAGNOSIS — D50.9 IRON DEFICIENCY ANEMIA, UNSPECIFIED IRON DEFICIENCY ANEMIA TYPE: Primary | ICD-10-CM

## 2021-05-27 DIAGNOSIS — N18.4 STAGE 4 CHRONIC KIDNEY DISEASE (HCC): ICD-10-CM

## 2021-05-27 PROCEDURE — 96365 THER/PROPH/DIAG IV INF INIT: CPT

## 2021-05-27 RX ORDER — SODIUM CHLORIDE 9 MG/ML
20 INJECTION, SOLUTION INTRAVENOUS ONCE
Status: CANCELLED | OUTPATIENT
Start: 2021-06-03

## 2021-05-27 RX ORDER — SODIUM CHLORIDE 9 MG/ML
20 INJECTION, SOLUTION INTRAVENOUS ONCE
Status: COMPLETED | OUTPATIENT
Start: 2021-05-27 | End: 2021-05-27

## 2021-05-27 RX ADMIN — FERUMOXYTOL 510 MG: 510 INJECTION INTRAVENOUS at 14:19

## 2021-05-27 RX ADMIN — SODIUM CHLORIDE 20 ML/HR: 0.9 INJECTION, SOLUTION INTRAVENOUS at 14:22

## 2021-05-27 NOTE — PLAN OF CARE
Problem: Potential for Falls  Goal: Patient will remain free of falls  Description: INTERVENTIONS:  - Assess patient frequently for physical needs  -  Identify cognitive and physical deficits and behaviors that affect risk of falls    -  [place] fall precautions as indicated by assessment   - Educate patient/family on patient safety including physical limitations  - Instruct patient to call for assistance with activity based on assessment  - Modify environment to reduce risk of injury  - Consider OT/PT consult to assist with strengthening/mobility  Outcome: Progressing negative detailed exam

## 2021-05-29 DIAGNOSIS — M86.9 OSTEOMYELITIS OF LEFT FOOT, UNSPECIFIED TYPE (HCC): ICD-10-CM

## 2021-05-30 PROBLEM — I50.31 ACUTE DIASTOLIC (CONGESTIVE) HEART FAILURE (HCC): Status: RESOLVED | Noted: 2018-11-12 | Resolved: 2021-05-30

## 2021-05-30 RX ORDER — SODIUM BICARBONATE 650 MG/1
650 TABLET ORAL
Qty: 180 TABLET | Refills: 1 | Status: ON HOLD | OUTPATIENT
Start: 2021-05-30 | End: 2021-12-01 | Stop reason: SDUPTHER

## 2021-06-01 ENCOUNTER — OFFICE VISIT (OUTPATIENT)
Dept: CARDIOLOGY CLINIC | Facility: CLINIC | Age: 66
End: 2021-06-01
Payer: MEDICARE

## 2021-06-01 VITALS
SYSTOLIC BLOOD PRESSURE: 124 MMHG | HEART RATE: 80 BPM | BODY MASS INDEX: 29.38 KG/M2 | RESPIRATION RATE: 16 BRPM | HEIGHT: 70 IN | WEIGHT: 205.2 LBS | DIASTOLIC BLOOD PRESSURE: 60 MMHG

## 2021-06-01 DIAGNOSIS — I49.5 SSS (SICK SINUS SYNDROME) (HCC): ICD-10-CM

## 2021-06-01 DIAGNOSIS — I50.32 CHRONIC DIASTOLIC (CONGESTIVE) HEART FAILURE (HCC): Primary | ICD-10-CM

## 2021-06-01 DIAGNOSIS — I48.0 PAF (PAROXYSMAL ATRIAL FIBRILLATION) (HCC): ICD-10-CM

## 2021-06-01 DIAGNOSIS — I12.9 HYPERTENSION WITH RENAL DISEASE: ICD-10-CM

## 2021-06-01 DIAGNOSIS — I34.2 NONRHEUMATIC MITRAL VALVE STENOSIS: ICD-10-CM

## 2021-06-01 DIAGNOSIS — E78.2 MIXED HYPERLIPIDEMIA: ICD-10-CM

## 2021-06-01 DIAGNOSIS — I35.0 NONRHEUMATIC AORTIC VALVE STENOSIS: ICD-10-CM

## 2021-06-01 PROCEDURE — 99214 OFFICE O/P EST MOD 30 MIN: CPT | Performed by: INTERNAL MEDICINE

## 2021-06-01 RX ORDER — TORSEMIDE 20 MG/1
20 TABLET ORAL 2 TIMES DAILY
Qty: 180 TABLET | Refills: 0
Start: 2021-06-01 | End: 2021-06-07 | Stop reason: SDUPTHER

## 2021-06-01 NOTE — PROGRESS NOTES
Cardiology Follow Up    Jes Yoder  1955  [id number]  [address]  [address]  [facility] [address] [phone]    Reason for visit:  Six-month follow-up for chronic diastolic heart failure, hypertension with renal disease, moderate mitral stenosis with mild-to-moderate mitral regurgitation, mild-to-moderate aortic stenosis, paroxysmal atrial fibrillation, hyperlipidemia and sick sinus syndrome with pacemaker in situ  Patient also has diabetes mellitus and chronic kidney disease      1  Chronic diastolic (congestive) heart failure ([address] )     2  Hypertension with renal disease     3  Nonrheumatic mitral valve stenosis     4  Nonrheumatic aortic valve stenosis     5  PAF (paroxysmal atrial fibrillation) ([facility] )     6  Mixed hyperlipidemia     7  SSS (sick sinus syndrome) ([facility])         Interval History:  Since his last visit he has done relatively well  He denies dyspnea on exertion  He does use his treadmill  He denies chest pain, palpitations or dizziness  He has ongoing moderate edema of the lower extremities being greater on the left than the right        Patient Active Problem List   Diagnosis    Bilateral leg edema    Diabetes mellitus with neuropathy ([facility])    Easy bruising    Eczema    Erectile dysfunction of non-organic origin    Gout    Hyperlipidemia    Uremia    Osteoarthritis    Peripheral arterial disease (HCC)    PVCs (premature ventricular contractions)    Rhinitis    Systolic murmur    Vertigo    Complete heart block ([facility])    Metabolic acidosis    Other hyperlipidemia    Severe sepsis (HCC)    PAF (paroxysmal atrial fibrillation) ([facility])    Acute respiratory failure with hypoxia (HCC)    Persistent proteinuria    Volume overload    Urinary retention    NICOLASA (obstructive sleep apnea)    Type 2 diabetes mellitus with chronic kidney disease, with long-term current use of insulin ([facility]    Hypertension with renal disease    Stage 4 chronic kidney disease (HCC)    Nonrheumatic aortic valve stenosis    Anemia    Fever    Mitral valve stenosis    Abnormal CT of the chest    Acute pulmonary edema (HCC)    Chronic diastolic (congestive) heart failure (HCC)    Left renal artery stenosis (HCC)    S/P amputation of lesser toe, left (HCC)    S/P amputation of lesser toe, right (HCC)    Elevated troponin I level    Staphylococcus aureus bacteremia    Type 2 diabetes mellitus with diabetic neuropathy, without long-term current use of insulin (HCC)    Iron deficiency anemia    Anxiety    Osteomyelitis of left foot (HCC)    Amputation of left great toe (HCC)    Multiple drug resistant organism (MDRO) culture positive    Diabetic kidney disease [facility] )    Diabetes mellitus ([facility] )    Renovascular hypertension    Acute renal failure (ARF) (HCC)    Stage 3 chronic kidney disease (HCC)    SSS (sick sinus syndrome) (HCC)    Chronic obstructive pulmonary disease (HCC)     Past Medical History:   Diagnosis Date    Anemia     iron def    Arthritis     OA    Bruise of both arms     forearms and both hands    Bruises easily     CHF (congestive heart failure) (HCC)     Chronic kidney disease     CPAP (continuous positive airway pressure) dependence     Diabetes mellitus (HCC)     Diabetic foot ulcer (HCC)     Eczema     Erectile dysfunction     Gout     Hyperlipidemia     Hypertension     Hypoglycemia 3/8/2019    Murmur     Nephropathy     Osteoarthritis     PAD (peripheral artery disease) ([facility])     Seasonal allergies     Sleep apnea     Toe infection     bilat great toes    Vertigo     Walks frequently     Wears dentures     upper    Wears glasses      Social History     Socioeconomic History    Marital status: /Civil Union     Spouse name: Not on file    Number of children: Not on file    Years of education: Not on file    Highest education level: Not on file Occupational History    Not on file   Social Needs    Financial resource strain: Not on file    Food insecurity     Worry: Not on file     Inability: Not on file    Transportation needs     Medical: Not on file     Non-medical: Not on file   Tobacco Use    Smoking status: Former Smoker     Packs/day: 0 25     Years: 20 00     Pack years: 5 00     Types: Cigarettes     Start date: 1     Quit date:      Years since quittin 4    Smokeless tobacco: Never Used    Tobacco comment: quit 10 years ago   Substance and Sexual Activity    Alcohol use: No     Frequency: Never     Drinks per session: Patient refused     Binge frequency: Never    Drug use: No    Sexual activity: Not Currently     Partners: Female   Lifestyle    Physical activity     Days per week: Not on file     Minutes per session: Not on file    Stress: Not on file   Relationships    Social connections     Talks on phone: Not on file     Gets together: Not on file     Attends [employer]ical service: Not on file     Active member of club or organization: Not on file     Attends meetings of clubs or organizations: Not on file     Relationship status: Not on file    Intimate partner violence     Fear of current or ex partner: Not on file     Emotionally abused: Not on file     Physically abused: Not on file     Forced sexual activity: Not on file   Other Topics Concern    Not on file   Social History Narrative    ** Merged History Encounter **         Daily cola consumption (2 cans/day)      Family History   Problem Relation Age of Onset    Heart disease Father     Hypertension Father     Pulmonary embolism Father     Hypertension Mother      Past Surgical History:   Procedure Laterality Date    CARDIAC PACEMAKER PLACEMENT      CARPAL TUNNEL RELEASE Left     CARPAL TUNNEL RELEASE Right     CARPAL TUNNEL RELEASE      COLONOSCOPY  2020    FOOT AMPUTATION Left 2/10/2020    Procedure: PARTIAL 1ST RAY AMPUTATION;  Surgeon: Sarah Taveras LIZBETH Carrillo;  [facility];  Service: Podiatry    INCISION AND DRAINAGE OF WOUND Left 1/12/2020    Procedure: INCISION AND DRAINAGE (I&D) EXTREMITY AND REMOVAL OF SESMOID BONE;  Surgeon: Katerine Armstrong DPM;  [facility];  Service: Podiatry    IR PICC REPOSITION  1/14/2020    KNEE ARTHROSCOPY Left     KNEE ARTHROSCOPY Right     KNEE SURGERY      FL AMPUTATION TOE,I-P JT Bilateral 5/2/2017    Procedure: PARTIAL AMPUTATION RIGHT AND LEFT HALLUX ;  Surgeon: Fransisca Recinos DPM;  [facility];  Service: Podiatry    FL AMPUTATION TOE,MT-P JT Left 7/25/2017    Procedure: 2ND TOE AMPUTATION;  Surgeon: Fransisca Recinos DPM;  [facility];  Service: Podiatry    SHOULDER ARTHROSCOPY Left     with screws,RTC    SHOULDER SURGERY      TOE AMPUTATION Left 1/8/2020    Procedure: HALLUX AMPUTATION;  Surgeon: Katerine Armstrong DPM;  [facility];  Service: Podiatry    UPPER GASTROINTESTINAL ENDOSCOPY  03/2020    Intestinal metaplasia prepyloric    VASECTOMY         Current Outpatient Medications:     allopurinol (ZYLOPRIM) 300 mg tablet, TAKE 1 TABLET BY MOUTH EVERY DAY IN THE MORNING, Disp: 90 tablet, Rfl: 1    amLODIPine (NORVASC) 10 mg tablet, Take 1 tablet (10 mg total) by mouth daily, Disp: 90 tablet, Rfl: 3    apixaban (ELIQUIS) 5 mg, Take 1 tablet (5 mg total) by mouth every 12 (twelve) hours, Disp: 60 tablet, Rfl: 11    betamethasone valerate (VALISONE) 0 1 % cream, as needed , Disp: , Rfl: 3    calcium acetate (PHOSLO) capsule, Take 1 capsule (667 mg total) by mouth 3 (three) times a day with meals, Disp: 90 capsule, Rfl: 3    Cholecalciferol (VITAMIN D-3) 1000 units CAPS, Take 1 capsule by mouth every morning  , Disp: , Rfl:     Dupilumab (Dupixent) 300 MG/2ML SOPN, Inject 300 mg under the skin Once every 2 weeks, Disp: , Rfl:     famotidine (PEPCID) 40 MG tablet, Take 1 tablet (40 mg total) by mouth daily, Disp: 90 tablet, Rfl: 3    Insulin Degludec-Liraglutide (Insulin Degludec-Liraglutide) 100 units-3 6 mg/mL injection pen, Inject 19 Units under the skin daily, Disp: 5 pen, Rfl: 3    Insulin Pen Needle (BD Pen Needle Zenaida U/F) 32G X 4 MM MISC, Use 1 daily, Disp: 200 each, Rfl: 3    metolazone (ZAROXOLYN) 5 mg tablet, Take 1 tablet (5 mg total) by mouth see administration instructions Take every 6 days prn as directed by physician for wt gain/edema, Disp: 5 tablet, Rfl: 5    metoprolol tartrate (LOPRESSOR) 50 mg tablet, Take 1 tablet (50 mg total) by mouth every 12 (twelve) hours, Disp: 180 tablet, Rfl: 3    Multiple Vitamin (MULTIVITAMIN) tablet, Take 1 tablet by mouth daily IN AM, Disp: , Rfl:     omega-3-acid ethyl esters (LOVAZA) 1 g capsule, Take 2 capsules (2 g total) by mouth 2 (two) times a day Mail print prescription to pt, Disp: 360 capsule, Rfl: 3    OneTouch Ultra test strip, USE TO TEST BLOOD SUGAR 3 TIMES A DAY, Disp: 100 each, Rfl: 3    simvastatin (ZOCOR) 20 mg tablet, Take 1 tablet (20 mg total) by mouth daily at bedtime, Disp: 90 tablet, Rfl: 3    sodium bicarbonate 650 mg tablet, TAKE 1 TABLET (650 MG TOTAL) BY MOUTH 2 (TWO) TIMES DAILY AFTER MEALS, Disp: 180 tablet, Rfl: 1    tamsulosin (FLOMAX) 0 4 mg, Take 1 capsule (0 4 mg total) by mouth daily with dinner, Disp: 90 capsule, Rfl: 3    torsemide (DEMADEX) 20 mg tablet, 40 mg in AM and 20 mg afternoon, Disp: 180 tablet, Rfl: 0  Allergies   Allergen Reactions    Invokana [Canagliflozin] Other (See Comments)     Caused circulation problems    Lamisil [Terbinafine] Rash    Lamisil [Terbinafine] Blisters     Wife states " His skin peeled from head to toe"    Other Swelling     Pomegranate - facial swelling, no swelling of tongue, esophagus  Adhesive tape   Latex Rash       Review of Systems:  Review of Systems   Constitutional: Negative for appetite change, fatigue and unexpected weight change  Respiratory: Negative for cough, chest tightness, shortness of breath and wheezing  Cardiovascular: Positive for leg swelling  Negative for chest pain and palpitations  Gastrointestinal: Negative for abdominal pain, blood in stool, constipation and diarrhea  Genitourinary: Positive for frequency  Negative for dysuria and hematuria  Musculoskeletal: Positive for arthralgias  Negative for back pain, gait problem and joint swelling  Neurological: Negative for dizziness, syncope, speech difficulty, light-headedness and headaches  Psychiatric/Behavioral: Negative for agitation, behavioral problems, confusion and decreased concentration  Physical Exam:  Vitals:    06/01/21 0753   BP: 124/60   Pulse: 80   Resp: 16   Weight: 93 1 kg (205 lb 3 2 oz)   Height: 5' 10" (1 778 m)       Physical Exam  Constitutional:       General: He is not in acute distress  Appearance: He is obese  He is not ill-appearing  HENT:      Head: Normocephalic and atraumatic  Mouth/Throat:      Mouth: Mucous membranes are moist       Pharynx: No oropharyngeal exudate or posterior oropharyngeal erythema  Eyes:      General: No scleral icterus  Conjunctiva/sclera: Conjunctivae normal    Neck:      Musculoskeletal: Neck supple  Thyroid: No thyroid mass or thyromegaly  Vascular: Decreased carotid pulses  Carotid bruit (transmitted murmur vs bruit) present  No JVD  Cardiovascular:      Rate and Rhythm: Normal rate and regular rhythm  Pulses:           Dorsalis pedis pulses are 0 on the right side and 0 on the left side  Posterior tibial pulses are 2+ on the right side and 2+ on the left side  Heart sounds: Murmur present  Crescendo  decrescendo  systolic (basal systolic murmur) murmur present with a grade of 3/6  No diastolic murmur  No friction rub  No gallop  Comments: Preserved splitting of the 2nd heart sound  Pulmonary:      Breath sounds: Decreased breath sounds present  No wheezing, rhonchi or rales  Abdominal:      Palpations: Abdomen is soft   There is no hepatomegaly, splenomegaly or mass  Tenderness: There is no abdominal tenderness  Musculoskeletal:         General: Swelling (2+ edema of the LEs L>RLE) present  No deformity  Skin:     General: Skin is warm  Coloration: Skin is not jaundiced or pale  Findings: No bruising, erythema, lesion or rash  Neurological:      Mental Status: He is oriented to person, place, and time  Cranial Nerves: No cranial nerve deficit  Sensory: No sensory deficit  Motor: No weakness  Psychiatric:         Mood and Affect: Mood normal          Behavior: Behavior normal          Thought Content: Thought content normal          Judgment: Judgment normal          Discussion/Summary:  1  Chronic diastolic heart failure  Appears compensated on torsemide 20 mg b i d   No dyspnea  His edema appears about the same  2  Hypertension with renal disease  Blood pressure excellent today on amlodipine 10 mg daily metoprolol 50 mg b i d   Continue same  3  Moderate mitral stenosis with mild-to-moderate mitral regurgitation  Echo done just 11 days ago showed a mean gradient of 7 mmHg  This is been stable for some time  4  Aortic stenosis  Mild to moderate on recent echocardiogram  5  Paroxysmal atrial fibrillation  Patient on metoprolol for potential rate control and on Eliquis for stroke prophylaxis  Was in sinus rhythm today  No AFib on the patient's last interrogation in March 6  Sick sinus syndrome  Pacer in situ     Normally functioning device on interrogations    Follow-up 6 months    Lamont Chen MD

## 2021-06-04 ENCOUNTER — TELEPHONE (OUTPATIENT)
Dept: ADMINISTRATIVE | Facility: OTHER | Age: 66
End: 2021-06-04

## 2021-06-04 ENCOUNTER — OFFICE VISIT (OUTPATIENT)
Dept: FAMILY MEDICINE CLINIC | Facility: CLINIC | Age: 66
End: 2021-06-04
Payer: MEDICARE

## 2021-06-04 VITALS
SYSTOLIC BLOOD PRESSURE: 140 MMHG | DIASTOLIC BLOOD PRESSURE: 64 MMHG | HEART RATE: 64 BPM | RESPIRATION RATE: 16 BRPM | WEIGHT: 208.2 LBS | OXYGEN SATURATION: 97 % | TEMPERATURE: 98.3 F | BODY MASS INDEX: 29.87 KG/M2

## 2021-06-04 DIAGNOSIS — E11.22 TYPE 2 DIABETES MELLITUS WITH STAGE 4 CHRONIC KIDNEY DISEASE, WITH LONG-TERM CURRENT USE OF INSULIN (HCC): Primary | ICD-10-CM

## 2021-06-04 DIAGNOSIS — Z00.00 MEDICARE ANNUAL WELLNESS VISIT, SUBSEQUENT: ICD-10-CM

## 2021-06-04 DIAGNOSIS — E78.2 MIXED HYPERLIPIDEMIA: ICD-10-CM

## 2021-06-04 DIAGNOSIS — Z79.4 TYPE 2 DIABETES MELLITUS WITH STAGE 4 CHRONIC KIDNEY DISEASE, WITH LONG-TERM CURRENT USE OF INSULIN (HCC): Primary | ICD-10-CM

## 2021-06-04 DIAGNOSIS — N18.4 STAGE 4 CHRONIC KIDNEY DISEASE (HCC): ICD-10-CM

## 2021-06-04 DIAGNOSIS — Z12.5 SCREENING PSA (PROSTATE SPECIFIC ANTIGEN): ICD-10-CM

## 2021-06-04 DIAGNOSIS — N18.4 TYPE 2 DIABETES MELLITUS WITH STAGE 4 CHRONIC KIDNEY DISEASE, WITH LONG-TERM CURRENT USE OF INSULIN (HCC): Primary | ICD-10-CM

## 2021-06-04 DIAGNOSIS — I50.32 CHRONIC DIASTOLIC (CONGESTIVE) HEART FAILURE (HCC): ICD-10-CM

## 2021-06-04 DIAGNOSIS — I12.9 HYPERTENSION WITH RENAL DISEASE: ICD-10-CM

## 2021-06-04 DIAGNOSIS — E11.40 TYPE 2 DIABETES MELLITUS WITH DIABETIC NEUROPATHY, WITHOUT LONG-TERM CURRENT USE OF INSULIN (HCC): ICD-10-CM

## 2021-06-04 DIAGNOSIS — I48.0 PAF (PAROXYSMAL ATRIAL FIBRILLATION) (HCC): ICD-10-CM

## 2021-06-04 PROCEDURE — 1123F ACP DISCUSS/DSCN MKR DOCD: CPT | Performed by: FAMILY MEDICINE

## 2021-06-04 PROCEDURE — 99214 OFFICE O/P EST MOD 30 MIN: CPT | Performed by: FAMILY MEDICINE

## 2021-06-04 PROCEDURE — G0439 PPPS, SUBSEQ VISIT: HCPCS | Performed by: FAMILY MEDICINE

## 2021-06-04 NOTE — LETTER
Diabetic Foot Exam Form    Date Requested: 21  Patient: Romi Juarez  Patient : 1955   Referring Provider: Christina Neither, DO    Diabetic Foot Exam Performed with shoes and socks removed        Yes         No     Date of Diabetic Foot Exam ______________________________  Risk Score ____________________________________________    Left Foot       Visual Inspection         Monofilament Testing Sensory Exam        Pedal Pulses         Additional Comments         Right Foot      Visual Inspection         Monofilament Testing Sensory Exam       Pedal Pulses         Additional Comments         Comments __________________________________________________________    Practice Providing Exam ______________________________________________    Exam Performed By (print name) _______________________________________      Provider Signature ___________________________________________________      These reports are needed for  compliance    Please fax this completed form and a copy of the Diabetic Foot Exam report to our office located at [place] [address] as soon as possible to [phone] clif Patrick: Phone [phone]    We thank you for your assistance in treating our mutual patient

## 2021-06-04 NOTE — PROGRESS NOTES
Assessment/Plan: For fasting labs as per specialist and will add PSA  Patient to continue present treatment  Patient instructed to follow a low-fat, low-salt and a low sugar /carbohydrate diet and continue regular exercise walking on his treadmill for 150 minutes per week  Follow up with specialists as scheduled and return to the office in 6 months  Diagnoses and all orders for this visit:    Type 2 diabetes mellitus with stage 4 chronic kidney disease, with long-term current use of insulin ([facility] )    Hypertension with renal disease    Mixed hyperlipidemia    Medicare annual wellness visit, subsequent    Type 2 diabetes mellitus with diabetic neuropathy, without long-term current use of insulin (HCC)    Chronic diastolic (congestive) heart failure (HCC)    PAF (paroxysmal atrial fibrillation) (HCC)    Stage 4 chronic kidney disease (HCC)    Screening PSA (prostate specific antigen)  -     PSA, Total Screen    Other orders  -     Cancel: POCT hemoglobin A1c          Subjective:      Patient ID: Jerry Barron is a 77 y o  male  Patient is here for follow-up appoint for chronic conditions and annual Medicare wellness exam   Patient is not fasting today and has fasting labs ordered for [facility]  Patient has been feeling well overall and walks on his treadmill daily for 15-30 minutes  Patient is up-to-date on diabetic eye exam and foot exam   Patient is up-to-date on colonoscopy  Diabetes  He presents for his follow-up diabetic visit  He has type 2 diabetes mellitus  His disease course has been stable  There are no hypoglycemic associated symptoms  Pertinent negatives for hypoglycemia include no headaches  Pertinent negatives for diabetes include no blurred vision, no chest pain, no fatigue, no foot paresthesias, no foot ulcerations, no polydipsia, no polyuria, no visual change, no weakness and no weight loss  There are no hypoglycemic complications   Symptoms are improving  Diabetic complications include heart disease and nephropathy  Pertinent negatives for diabetic complications include no CVA, peripheral neuropathy or retinopathy  Risk factors for coronary artery disease include dyslipidemia, diabetes mellitus, hypertension, male sex, obesity, family history and tobacco exposure  Current diabetic treatment includes insulin injections  He is compliant with treatment all of the time  His weight is stable  He is following a generally healthy diet  He participates in exercise daily  There is no change in his home blood glucose trend  His breakfast blood glucose is taken between 6-7 am  His breakfast blood glucose range is generally 130-140 mg/dl  His dinner blood glucose is taken between 7-8 pm  His dinner blood glucose range is generally 140-180 mg/dl  An ACE inhibitor/angiotensin II receptor blocker is not being taken  He sees a podiatrist Eye exam is current  Hypertension  This is a chronic problem  The problem is controlled  Associated symptoms include peripheral edema  Pertinent negatives include no anxiety, blurred vision, chest pain, headaches, orthopnea, palpitations, PND or shortness of breath  Past treatments include beta blockers and calcium channel blockers  The current treatment provides significant improvement  There are no compliance problems  Hypertensive end-organ damage includes CAD/MI  There is no history of CVA or retinopathy  Hyperlipidemia  This is a chronic problem  The problem is controlled  Exacerbating diseases include diabetes  He has no history of hypothyroidism  Pertinent negatives include no chest pain, focal sensory loss, focal weakness, leg pain, myalgias or shortness of breath  Current antihyperlipidemic treatment includes statins  The current treatment provides significant improvement of lipids  There are no compliance problems          The following portions of the patient's history were reviewed and updated as appropriate: allergies, current medications, past family history, past medical history, past social history, past surgical history and problem list     Review of Systems   Constitutional: Negative for fatigue and weight loss  Eyes: Negative for blurred vision  Respiratory: Negative for shortness of breath  Cardiovascular: Negative for chest pain, palpitations, orthopnea and PND  Endocrine: Negative for polydipsia and polyuria  Musculoskeletal: Negative for myalgias  Neurological: Negative for focal weakness, weakness and headaches  Objective:      /64   Pulse 64   Temp 98 3 °F (36 8 °C) (Temporal)   Resp 16   Wt 94 4 kg (208 lb 3 2 oz)   SpO2 97%   BMI 29 87 kg/m²          Physical Exam  Constitutional:       General: He is not in acute distress  Appearance: Normal appearance  HENT:      Head: Normocephalic  Mouth/Throat:      Mouth: Mucous membranes are moist    Eyes:      General: No scleral icterus  Conjunctiva/sclera: Conjunctivae normal    Neck:      Musculoskeletal: Neck supple  Vascular: No carotid bruit  Cardiovascular:      Rate and Rhythm: Normal rate and regular rhythm  Heart sounds: Murmur present  Pulmonary:      Effort: Pulmonary effort is normal       Breath sounds: Normal breath sounds  Abdominal:      Palpations: Abdomen is soft  Tenderness: There is no abdominal tenderness  Musculoskeletal:      Right lower leg: Edema present  Left lower leg: Edema present  Lymphadenopathy:      Cervical: No cervical adenopathy  Skin:     General: Skin is warm and dry  Neurological:      General: No focal deficit present  Mental Status: He is alert and oriented to person, place, and time  Psychiatric:         Mood and Affect: Mood normal          Behavior: Behavior normal          Thought Content:  Thought content normal          Judgment: Judgment normal

## 2021-06-04 NOTE — TELEPHONE ENCOUNTER
----- Message from [facility] sent at 6/4/2021  8:37 AM EDT -----  06/04/21 8:37 AM    Hello, our patient Rohit Kaufman has had Diabetic Foot Exam completed/performed  Please assist in updating the patient chart by making an External outreach to [facility] (Dr Lisa Major) facility located in [place], [place]  The date of service is most recent possibly May 2021      Thank you,  [facility]  [facility]

## 2021-06-04 NOTE — PATIENT INSTRUCTIONS

## 2021-06-04 NOTE — TELEPHONE ENCOUNTER
Upon review of the In Basket request and the patient's chart, initial outreach has been made via fax, please see Contacts section for details       Thank you  Joseph Keller MA

## 2021-06-04 NOTE — TELEPHONE ENCOUNTER
Upon review of the In Basket request we were able to locate, review, and update the patient chart as requested for Diabetic Foot Exam     Any additional questions or concerns should be emailed to the Practice Liaisons via [email or web address] email, please do not reply via In Basket      Thank you  Zuly Peres MA

## 2021-06-04 NOTE — PROGRESS NOTES
BMI Counseling: Body mass index is 29 87 kg/m²  The BMI is above normal  Nutrition recommendations include reducing portion sizes, decreasing overall calorie intake, 3-5 servings of fruits/vegetables daily, reducing fast food intake, consuming healthier snacks, decreasing soda and/or juice intake, moderation in carbohydrate intake and reducing intake of saturated fat and trans fat  Exercise recommendations include moderate aerobic physical activity for 150 minutes/week  Assessment and Plan:     Problem List Items Addressed This Visit        Endocrine    Type 2 diabetes mellitus with chronic kidney disease, with long-term current use of insulin ([address] ) - Primary        BMI Counseling: Body mass index is 29 87 kg/m²  The BMI is above normal  Nutrition recommendations include decreasing portion sizes, encouraging healthy choices of fruits and vegetables, decreasing fast food intake, consuming healthier snacks, limiting drinks that contain sugar, moderation in carbohydrate intake and reducing intake of saturated and trans fat  Exercise recommendations include moderate physical activity 150 minutes/week  Preventive health issues were discussed with patient, and age appropriate screening tests were ordered as noted in patient's After Visit Summary  Personalized health advice and appropriate referrals for health education or preventive services given if needed, as noted in patient's After Visit Summary       History of Present Illness:     Patient presents for Medicare Annual Wellness visit    Patient Care Team:  Marizol Johnson DO as PCP - General (Family Medicine)  Michel Machado MD as PCP - Endocrinology (Endocrinology)  Army Jonathan Burnett MD Laquita Lied, DPM Tresia Murdoch, DO Illona Rouse, MD Terald Misty, DO (Nephrology)     Problem List:     Patient Active Problem List   Diagnosis    Bilateral leg edema    Diabetes mellitus with neuropathy (HCC)    Easy bruising    Eczema    Erectile dysfunction of non-organic origin    Gout    Hyperlipidemia    Uremia    Osteoarthritis    Peripheral arterial disease (HCC)    PVCs (premature ventricular contractions)    Rhinitis    Systolic murmur    Vertigo    Complete heart block (HCC)    Metabolic acidosis    Other hyperlipidemia    Severe sepsis (HCC)    PA[facility]paroxysmal atrial fibrillation) ([facility])    Acute respiratory failure with hypoxia (HCC)    Persistent proteinuria    Volume overload    Urinary retention    NICOLASA (obstructive sleep apnea)    Type 2 diabetes mellitus with chronic kidney disease, with long-term current use of insulin ([facility])    Hypertension with renal disease    Stage 4 chronic kidney disease ([facility])    Nonrheumatic aortic valve stenosis    Anemia    Fever    Mitral valve stenosis    Abnormal CT of the chest    Acute pulmonary edema ([facility])    Chronic diastolic (congestive) heart failure ([facility])    Left renal artery stenosis ([facility])    S/P amputation of lesser toe, left ([facility])    S/P amputation of lesser toe, right (HCC)    Elevated troponin I level    Staphylococcus aureus bacteremia    Type 2 diabetes mellitus with diabetic neuropathy, without long-term current use of insulin ([facility])    Iron deficiency anemia    Anxiety    Osteomyelitis of left foot ([facility])    Amputation of left great toe ([facility])    Multiple drug resistant organism (MDRO) culture positive    Diabetic kidney disease [facility]    Diabetes mellitus ([facility]    Renovascular hypertension    Acute renal failure (ARF) ([facility])    Stage 3 chronic kidney disease ([facility])    SSS (sick sinus syndrome) ([facility])    Chronic obstructive pulmonary disease ([facility])      Past Medical and Surgical History:     Past Medical History:   Diagnosis Date    Anemia     iron def    Arthritis     OA    Bruise of both arms     forearms and both hands    Bruises easily     CHF (congestive heart failure) ([facility])     Chronic kidney disease     CPAP (continuous positive airway pressure) dependence     Diabetes mellitus ([facility]     Diabetic foot ulcer (HCC)     Eczema     Erectile dysfunction     Gout     Heart murmur     Hyperlipidemia     Hypertension     Hypoglycemia 3/8/2019    Murmur     Nephropathy     Osteoarthritis     PAD (peripheral artery disease) (HCC)     Seasonal allergies     Sleep apnea     Toe infection     bilat great toes    Vertigo     Walks frequently     Wears dentures     upper    Wears glasses      Past Surgical History:   Procedure Laterality Date    CARDIAC PACEMAKER PLACEMENT      CARPAL TUNNEL RELEASE Left     CARPAL TUNNEL RELEASE Right     CARPAL TUNNEL RELEASE      COLONOSCOPY  08/2020    FOOT AMPUTATION Left 2/10/2020    Procedure: PARTIAL 1ST RAY AMPUTATION;  Surgeon: Sally Wilson DPM;  [facility];  Service: Podiatry    INCISION AND DRAINAGE OF WOUND Left 1/12/2020    Procedure: INCISION AND DRAINAGE (I&D) EXTREMITY AND REMOVAL OF SESMOID BONE;  Surgeon: Qiana Hair DPM;  [facility];  Service: Podiatry    IR PICC REPOSITION  1/14/2020    KNEE ARTHROSCOPY Left     KNEE ARTHROSCOPY Right     KNEE SURGERY      NV AMPUTATION TOE,I-P JT Bilateral 5/2/2017    Procedure: PARTIAL AMPUTATION RIGHT AND LEFT HALLUX ;  Surgeon: Sally Wilson DPM;  [facility];  Service: Podiatry    NV AMPUTATION TOE,MT-P JT Left 7/25/2017    Procedure: 2ND TOE AMPUTATION;  Surgeon: Sally Wilson DPM;  [facility];  Service: Podiatry    SHOULDER ARTHROSCOPY Left     with screws,RTC    SHOULDER SURGERY      TOE AMPUTATION Left 1/8/2020    Procedure: HALLUX AMPUTATION;  Surgeon: Qiana Hair DPM;  [facility];  Service: Podiatry    UPPER GASTROINTESTINAL ENDOSCOPY  03/2020    Intestinal metaplasia prepyloric    VASECTOMY        Family History:     Family History   Problem Relation Age of Onset    Heart disease Father         passed away    Hypertension Father     Pulmonary embolism Father     Hypertension Mother         assed away      Social History:     E-Cigarette/Vaping    E-Cigarette Use Never User      E-Cigarette/Vaping Substances    Nicotine No     THC No     CBD No     Flavoring No     Other No     Unknown No      Social History     Socioeconomic History    Marital status: /Civil Union     Spouse name: None    Number of children: None    Years of education: None    Highest education level: None   Occupational History    None   Social Needs    Financial resource strain: None    Food insecurity     Worry: None     Inability: None    Transportation needs     Medical: None     Non-medical: None   Tobacco Use    Smoking status: Former Smoker     Packs/day: 0 50     Years: 20 00     Pack years: 10 00     Types: Cigarettes     Start date: 1     Quit date: 2010     Years since quitting: 10 6    Smokeless tobacco: Never Used    Tobacco comment: quit 10 years ago   Substance and Sexual Activity    Alcohol use: No     Frequency: Never     Drinks per session: Patient refused     Binge frequency: Never    Drug use: No    Sexual activity: Not Currently     Partners: Female   Lifestyle    Physical activity     Days per week: None     Minutes per session: None    Stress: None   Relationships    Social connections     Talks on phone: None     Gets together: None     Attends [employer]ism service: None     Active member of club or organization: None     Attends meetings of clubs or organizations: None     Relationship status: None    Intimate partner violence     Fear of current or ex partner: None     Emotionally abused: None     Physically abused: None     Forced sexual activity: None   Other Topics Concern    None   Social History Narrative    ** Merged History Encounter **         Daily cola consumption (2 cans/day)      Medications and Allergies:     Current Outpatient Medications   Medication Sig Dispense Refill    allopurinol (ZYLOPRIM) 300 mg tablet TAKE 1 TABLET BY MOUTH EVERY DAY IN THE MORNING 90 tablet 1    amLODIPine (NORVASC) 10 mg tablet Take 1 tablet (10 mg total) by mouth daily 90 tablet 3    apixaban (ELIQUIS) 5 mg Take 1 tablet (5 mg total) by mouth every 12 (twelve) hours 60 tablet 11    betamethasone valerate (VALISONE) 0 1 % cream as needed   3    calcium acetate (PHOSLO) capsule Take 1 capsule (667 mg total) by mouth 3 (three) times a day with meals 90 capsule 3    Cholecalciferol (VITAMIN D-3) 1000 units CAPS Take 1 capsule by mouth every morning        Dupilumab (Dupixent) 300 MG/2ML SOPN Inject 300 mg under the skin Once every 2 weeks      famotidine (PEPCID) 40 MG tablet Take 1 tablet (40 mg total) by mouth daily 90 tablet 3    Insulin Degludec-Liraglutide (Insulin Degludec-Liraglutide) 100 units-3 6 mg/mL injection pen Inject 19 Units under the skin daily 5 pen 3    Insulin Pen Needle (BD Pen Needle Zenaida U/F) 32G X 4 MM MISC Use 1 daily 200 each 3    metolazone (ZAROXOLYN) 5 mg tablet Take 1 tablet (5 mg total) by mouth see administration instructions Take every 6 days prn as directed by physician for wt gain/edema 5 tablet 5    metoprolol tartrate (LOPRESSOR) 50 mg tablet Take 1 tablet (50 mg total) by mouth every 12 (twelve) hours 180 tablet 3    Multiple Vitamin (MULTIVITAMIN) tablet Take 1 tablet by mouth daily IN AM      omega-3-acid ethyl esters (LOVAZA) 1 g capsule Take 2 capsules (2 g total) by mouth 2 (two) times a day Mail print prescription to pt 360 capsule 3    OneTouch Ultra test strip USE TO TEST BLOOD SUGAR 3 TIMES A  each 3    simvastatin (ZOCOR) 20 mg tablet Take 1 tablet (20 mg total) by mouth daily at bedtime 90 tablet 3    sodium bicarbonate 650 mg tablet TAKE 1 TABLET (650 MG TOTAL) BY MOUTH 2 (TWO) TIMES DAILY AFTER MEALS 180 tablet 1    tamsulosin (FLOMAX) 0 4 mg Take 1 capsule (0 4 mg total) by mouth daily with dinner 90 capsule 3    torsemide (DEMADEX) 20 mg tablet Take 1 tablet (20 mg total) by mouth 2 (two) times a day (Patient taking differently: Take 20 mg by mouth 3 (three) times a day ) 180 tablet 0     No current facility-administered medications for this visit  Allergies   Allergen Reactions    Invokana [Canagliflozin] Other (See Comments)     Caused circulation problems    Lamisil [Terbinafine] Blisters     Wife states " His skin peeled from head to toe"    Other Swelling     Pomegranate - facial swelling, no swelling of tongue, esophagus  Adhesive tape   Latex Rash      Immunizations:     Immunization History   Administered Date(s) Administered    INFLUENZA 11/24/2015, 11/28/2016    Influenza Quadrivalent, 6-35 Months IM 11/24/2015, 11/28/2016, 09/14/2017    Influenza, high dose seasonal 0 7 mL 10/11/2018    Influenza, injectable, quadrivalent, preservative free 0 5 mL 11/15/2019    Influenza, recombinant, quadrivalent,injectable, preservative free 09/10/2020    Influenza, seasonal, injectable 01/11/2013, 12/18/2013, 04/20/2017, 11/27/2018, 11/06/2019    Pneumococcal Conjugate 13-Valent 10/16/2020    Pneumococcal Polysaccharide PPV23 11/21/2005    SARS-CoV-2 / COVID-19 mRNA IM (Rere Alexander) 01/27/2021, 03/04/2021    TD (adult) Preservative Free 07/15/2016    Zoster 05/20/2015      Health Maintenance:         Topic Date Due    Colonoscopy Surveillance  08/05/2021    Hepatitis C Screening  Completed         Topic Date Due    DTaP,Tdap,and Td Vaccines (1 - Tdap) 08/29/1974      Medicare Health Risk Assessment:     Temp 98 3 °F (36 8 °C) (Temporal)   Wt 94 4 kg (208 lb 3 2 oz)   BMI 29 87 kg/m²      Berlin Crump is here for his Subsequent Wellness visit  Health Risk Assessment:   Patient rates overall health as good  Patient feels that their physical health rating is slightly better  Patient is very satisfied with their life  Eyesight was rated as same  Hearing was rated as same  Patient feels that their emotional and mental health rating is same  Patients states they are never, rarely angry   Patient states they are sometimes unusually tired/fatigued  Pain experienced in the last 7 days has been none  Patient states that he has experienced no weight loss or gain in last 6 months  Depression Screening:   PHQ-2 Score: 0      Fall Risk Screening: In the past year, patient has experienced: no history of falling in past year      Home Safety:  Patient does not have trouble with stairs inside or outside of their home  Patient has working smoke alarms and has working carbon monoxide detector  Home safety hazards include: none  Nutrition:   Current diet is Diabetic and Limited junk food  Medications:   Patient is currently taking over-the-counter supplements  OTC medications include: see medication list  Patient is able to manage medications  Activities of Daily Living (ADLs)/Instrumental Activities of Daily Living (IADLs):   Walk and transfer into and out of bed and chair?: Yes  Dress and groom yourself?: Yes    Bathe or shower yourself?: Yes    Feed yourself? Yes  Do your laundry/housekeeping?: Yes  Manage your money, pay your bills and track your expenses?: Yes  Make your own meals?: Yes    Do your own shopping?: Yes    Previous Hospitalizations:   Any hospitalizations or ED visits within the last 12 months?: No      Advance Care Planning:   Living will: Yes    Durable POA for healthcare:  Yes    Advanced directive: Yes      Cognitive Screening:   Provider or family/friend/caregiver concerned regarding cognition?: No    PREVENTIVE SCREENINGS      Cardiovascular Screening:    General: Screening Not Indicated, History Lipid Disorder and Risks and Benefits Discussed      Diabetes Screening:     General: Screening Not Indicated, History Diabetes and Risks and Benefits Discussed      Colorectal Cancer Screening:     General: History Colorectal Cancer and Risks and Benefits Discussed    Due for: Colonoscopy - High Risk      Prostate Cancer Screening:    General: Risks and Benefits Discussed Osteoporosis Screening:    General: Risks and Benefits Discussed and Screening Not Indicated      Abdominal Aortic Aneurysm (AAA) Screening:    Risk factors include: age between 73-69 yo and tobacco use        General: Risks and Benefits Discussed and Screening Current      Lung Cancer Screening:     General: Screening Not Indicated, Risks and Benefits Discussed and Screening Current      Hepatitis C Screening:    General: Screening Current    Screening, Brief Intervention, and Referral to Treatment (SBIRT)    Screening  Typical number of drinks in a day: 0  Typical number of drinks in a week: 0  Interpretation: Low risk drinking behavior  Single Item Drug Screening:  How often have you used an illegal drug (including marijuana) or a prescription medication for non-medical reasons in the past year? never    Single Item Drug Screen Score: 0  Interpretation: Negative screen for possible drug use disorder    Brief Intervention  Alcohol & drug use screenings were reviewed  No concerns regarding substance use disorder identified  Other Counseling Topics:   Car/seat belt/driving safety, skin self-exam, sunscreen and calcium and vitamin D intake and regular weightbearing exercise         Ethelyn Binning, DO

## 2021-06-06 ENCOUNTER — PATIENT MESSAGE (OUTPATIENT)
Dept: NEPHROLOGY | Facility: CLINIC | Age: 66
End: 2021-06-06

## 2021-06-06 DIAGNOSIS — I12.9 HYPERTENSION WITH RENAL DISEASE: Primary | ICD-10-CM

## 2021-06-06 DIAGNOSIS — N18.30 STAGE 3 CHRONIC KIDNEY DISEASE, UNSPECIFIED WHETHER STAGE 3A OR 3B CKD (HCC): ICD-10-CM

## 2021-06-07 DIAGNOSIS — I50.32 CHRONIC DIASTOLIC (CONGESTIVE) HEART FAILURE (HCC): ICD-10-CM

## 2021-06-07 DIAGNOSIS — E83.39 HYPERPHOSPHATEMIA: ICD-10-CM

## 2021-06-07 RX ORDER — TORSEMIDE 20 MG/1
20 TABLET ORAL 3 TIMES DAILY
Qty: 270 TABLET | Refills: 3 | Status: SHIPPED | OUTPATIENT
Start: 2021-06-07 | End: 2021-09-08

## 2021-06-07 RX ORDER — CALCIUM ACETATE 667 MG/1
667 CAPSULE ORAL
Qty: 270 CAPSULE | Refills: 3 | Status: SHIPPED | OUTPATIENT
Start: 2021-06-07 | End: 2021-07-19

## 2021-06-11 ENCOUNTER — REMOTE DEVICE CLINIC VISIT (OUTPATIENT)
Dept: CARDIOLOGY CLINIC | Facility: CLINIC | Age: 66
End: 2021-06-11
Payer: MEDICARE

## 2021-06-11 DIAGNOSIS — Z95.0 CARDIAC PACEMAKER IN SITU: Primary | ICD-10-CM

## 2021-06-11 PROCEDURE — 93296 REM INTERROG EVL PM/IDS: CPT | Performed by: INTERNAL MEDICINE

## 2021-06-11 PROCEDURE — 93294 REM INTERROG EVL PM/LDLS PM: CPT | Performed by: INTERNAL MEDICINE

## 2021-06-11 NOTE — PROGRESS NOTES
Results for orders placed or performed in visit on 06/11/21   Cardiac EP device report    Narrative    MDT-DUAL CHAMBER PPM (AAIR-DDDR MODE)/ACTIVE SYSTEM IS MRI CONDITIONAL  CARELINK TRANSMISSION: BATTERY VOLTAGE ADEQUATE (9 4 YRS)  AP-62%, -100% (>40% Audra@[employer])  ALL AVAILABLE LEAD PARAMETERS WITHIN NORMAL LIMITS  1 NSVT EPISODE FOR 12 BEATS, AVG CL~380MS  EF-60% (ECHO 5/24/21)  PT ON ELIQUIS & METOPROLOL  TASKED DR WINDY HERNANDEZ AT [facility]  NORMAL DEVICE FUNCTION   GV

## 2021-06-13 DIAGNOSIS — R33.9 URINARY RETENTION: ICD-10-CM

## 2021-06-13 RX ORDER — TAMSULOSIN HYDROCHLORIDE 0.4 MG/1
CAPSULE ORAL
Qty: 90 CAPSULE | Refills: 3 | Status: SHIPPED | OUTPATIENT
Start: 2021-06-13 | End: 2022-06-08

## 2021-06-14 ENCOUNTER — APPOINTMENT (OUTPATIENT)
Dept: LAB | Facility: CLINIC | Age: 66
End: 2021-06-14
Payer: MEDICARE

## 2021-06-14 DIAGNOSIS — N18.30 STAGE 3 CHRONIC KIDNEY DISEASE, UNSPECIFIED WHETHER STAGE 3A OR 3B CKD (HCC): ICD-10-CM

## 2021-06-14 DIAGNOSIS — I12.9 HYPERTENSION WITH RENAL DISEASE: ICD-10-CM

## 2021-06-14 LAB
ANION GAP SERPL CALCULATED.3IONS-SCNC: 6 MMOL/L (ref 4–13)
BACTERIA UR QL AUTO: ABNORMAL /HPF
BASOPHILS # BLD AUTO: 0.07 THOUSANDS/ΜL (ref 0–0.1)
BASOPHILS NFR BLD AUTO: 1 % (ref 0–1)
BILIRUB UR QL STRIP: NEGATIVE
BUN SERPL-MCNC: 63 MG/DL (ref 5–25)
CALCIUM SERPL-MCNC: 10 MG/DL (ref 8.3–10.1)
CHLORIDE SERPL-SCNC: 102 MMOL/L (ref 100–108)
CLARITY UR: CLEAR
CO2 SERPL-SCNC: 27 MMOL/L (ref 21–32)
COLOR UR: YELLOW
CREAT SERPL-MCNC: 3.81 MG/DL (ref 0.6–1.3)
CREAT UR-MCNC: 38.4 MG/DL
EOSINOPHIL # BLD AUTO: 0.38 THOUSAND/ΜL (ref 0–0.61)
EOSINOPHIL NFR BLD AUTO: 3 % (ref 0–6)
ERYTHROCYTE [DISTWIDTH] IN BLOOD BY AUTOMATED COUNT: 15.1 % (ref 11.6–15.1)
GFR SERPL CREATININE-BSD FRML MDRD: 15 ML/MIN/1.73SQ M
GLUCOSE P FAST SERPL-MCNC: 152 MG/DL (ref 65–99)
GLUCOSE UR STRIP-MCNC: ABNORMAL MG/DL
HCT VFR BLD AUTO: 35.1 % (ref 36.5–49.3)
HGB BLD-MCNC: 11.6 G/DL (ref 12–17)
HGB UR QL STRIP.AUTO: ABNORMAL
HYALINE CASTS #/AREA URNS LPF: ABNORMAL /LPF
IMM GRANULOCYTES # BLD AUTO: 0.08 THOUSAND/UL (ref 0–0.2)
IMM GRANULOCYTES NFR BLD AUTO: 1 % (ref 0–2)
KETONES UR STRIP-MCNC: NEGATIVE MG/DL
LEUKOCYTE ESTERASE UR QL STRIP: NEGATIVE
LYMPHOCYTES # BLD AUTO: 0.9 THOUSANDS/ΜL (ref 0.6–4.47)
LYMPHOCYTES NFR BLD AUTO: 7 % (ref 14–44)
MCH RBC QN AUTO: 30.9 PG (ref 26.8–34.3)
MCHC RBC AUTO-ENTMCNC: 33 G/DL (ref 31.4–37.4)
MCV RBC AUTO: 93 FL (ref 82–98)
MONOCYTES # BLD AUTO: 0.95 THOUSAND/ΜL (ref 0.17–1.22)
MONOCYTES NFR BLD AUTO: 8 % (ref 4–12)
NEUTROPHILS # BLD AUTO: 9.77 THOUSANDS/ΜL (ref 1.85–7.62)
NEUTS SEG NFR BLD AUTO: 80 % (ref 43–75)
NITRITE UR QL STRIP: NEGATIVE
NON-SQ EPI CELLS URNS QL MICRO: ABNORMAL /HPF
NRBC BLD AUTO-RTO: 0 /100 WBCS
PH UR STRIP.AUTO: 7 [PH]
PLATELET # BLD AUTO: 250 THOUSANDS/UL (ref 149–390)
PMV BLD AUTO: 9.9 FL (ref 8.9–12.7)
POTASSIUM SERPL-SCNC: 4.6 MMOL/L (ref 3.5–5.3)
PROT UR STRIP-MCNC: ABNORMAL MG/DL
PROT UR-MCNC: 284 MG/DL
PROT/CREAT UR: 7.4 MG/G{CREAT} (ref 0–0.1)
PSA SERPL-MCNC: 0.4 NG/ML (ref 0–4)
RBC # BLD AUTO: 3.76 MILLION/UL (ref 3.88–5.62)
RBC #/AREA URNS AUTO: ABNORMAL /HPF
SODIUM SERPL-SCNC: 135 MMOL/L (ref 136–145)
SP GR UR STRIP.AUTO: 1.01 (ref 1–1.03)
UROBILINOGEN UR QL STRIP.AUTO: 0.2 E.U./DL
WBC # BLD AUTO: 12.15 THOUSAND/UL (ref 4.31–10.16)
WBC #/AREA URNS AUTO: ABNORMAL /HPF

## 2021-06-14 PROCEDURE — 36415 COLL VENOUS BLD VENIPUNCTURE: CPT | Performed by: FAMILY MEDICINE

## 2021-06-14 PROCEDURE — G0103 PSA SCREENING: HCPCS | Performed by: FAMILY MEDICINE

## 2021-06-14 PROCEDURE — 85025 COMPLETE CBC W/AUTO DIFF WBC: CPT

## 2021-06-14 PROCEDURE — 80048 BASIC METABOLIC PNL TOTAL CA: CPT

## 2021-06-14 PROCEDURE — 81001 URINALYSIS AUTO W/SCOPE: CPT | Performed by: INTERNAL MEDICINE

## 2021-06-14 PROCEDURE — 84156 ASSAY OF PROTEIN URINE: CPT | Performed by: INTERNAL MEDICINE

## 2021-06-14 PROCEDURE — 82570 ASSAY OF URINE CREATININE: CPT | Performed by: INTERNAL MEDICINE

## 2021-06-16 ENCOUNTER — TELEPHONE (OUTPATIENT)
Dept: NEPHROLOGY | Facility: CLINIC | Age: 66
End: 2021-06-16

## 2021-06-16 DIAGNOSIS — N18.4 TYPE 2 DIABETES MELLITUS WITH STAGE 4 CHRONIC KIDNEY DISEASE, WITH LONG-TERM CURRENT USE OF INSULIN (HCC): Primary | ICD-10-CM

## 2021-06-16 DIAGNOSIS — Z79.4 TYPE 2 DIABETES MELLITUS WITH STAGE 4 CHRONIC KIDNEY DISEASE, WITH LONG-TERM CURRENT USE OF INSULIN (HCC): Primary | ICD-10-CM

## 2021-06-16 DIAGNOSIS — E11.22 TYPE 2 DIABETES MELLITUS WITH STAGE 4 CHRONIC KIDNEY DISEASE, WITH LONG-TERM CURRENT USE OF INSULIN (HCC): Primary | ICD-10-CM

## 2021-06-16 NOTE — TELEPHONE ENCOUNTER
----- Message from Iveth De Los Santos DO sent at 6/15/2021  3:01 PM EDT -----  Phanine trending upwards can you inquire about his weights    If stable and no complaints have him repeat in 1 week

## 2021-06-16 NOTE — TELEPHONE ENCOUNTER
I spoke to the patient and he is aware his creatinine is elevated at 3 81  he states his weights are stable  He will repeat a BMP in one week

## 2021-06-24 ENCOUNTER — TELEPHONE (OUTPATIENT)
Dept: NEPHROLOGY | Facility: HOSPITAL | Age: 66
End: 2021-06-24

## 2021-06-24 ENCOUNTER — APPOINTMENT (OUTPATIENT)
Dept: LAB | Facility: CLINIC | Age: 66
End: 2021-06-24
Payer: MEDICARE

## 2021-06-24 DIAGNOSIS — N18.4 TYPE 2 DIABETES MELLITUS WITH STAGE 4 CHRONIC KIDNEY DISEASE, WITH LONG-TERM CURRENT USE OF INSULIN (HCC): ICD-10-CM

## 2021-06-24 DIAGNOSIS — Z79.4 TYPE 2 DIABETES MELLITUS WITH STAGE 4 CHRONIC KIDNEY DISEASE, WITH LONG-TERM CURRENT USE OF INSULIN (HCC): ICD-10-CM

## 2021-06-24 DIAGNOSIS — E11.22 TYPE 2 DIABETES MELLITUS WITH STAGE 4 CHRONIC KIDNEY DISEASE, WITH LONG-TERM CURRENT USE OF INSULIN (HCC): ICD-10-CM

## 2021-06-24 LAB
ANION GAP SERPL CALCULATED.3IONS-SCNC: 7 MMOL/L (ref 4–13)
BUN SERPL-MCNC: 58 MG/DL (ref 5–25)
CALCIUM SERPL-MCNC: 9.7 MG/DL (ref 8.3–10.1)
CHLORIDE SERPL-SCNC: 100 MMOL/L (ref 100–108)
CO2 SERPL-SCNC: 26 MMOL/L (ref 21–32)
CREAT SERPL-MCNC: 3.81 MG/DL (ref 0.6–1.3)
GFR SERPL CREATININE-BSD FRML MDRD: 15 ML/MIN/1.73SQ M
GLUCOSE P FAST SERPL-MCNC: 115 MG/DL (ref 65–99)
POTASSIUM SERPL-SCNC: 4 MMOL/L (ref 3.5–5.3)
SODIUM SERPL-SCNC: 133 MMOL/L (ref 136–145)

## 2021-06-24 PROCEDURE — 36415 COLL VENOUS BLD VENIPUNCTURE: CPT

## 2021-06-24 PROCEDURE — 80048 BASIC METABOLIC PNL TOTAL CA: CPT

## 2021-06-24 NOTE — TELEPHONE ENCOUNTER
----- Message from Alease Kussmaul, DO sent at 6/24/2021 12:54 PM EDT -----  Please let him know his creatinins is stable at 3 81, likely diuretics affect  If he feels ok will monitor if he feels ok and have him do a bmp in 2 weeks   thanks

## 2021-06-24 NOTE — TELEPHONE ENCOUNTER
I spoke to the patient, he is aware his creatinine is still elevated but stable at 3 81  He states he is feeling well, and we will repeat a BMP in 2 weeks  No further questions or concerns at this time

## 2021-07-10 ENCOUNTER — APPOINTMENT (OUTPATIENT)
Dept: LAB | Facility: CLINIC | Age: 66
End: 2021-07-10
Payer: MEDICARE

## 2021-07-10 DIAGNOSIS — M86.9 OSTEOMYELITIS OF LEFT FOOT, UNSPECIFIED TYPE (HCC): ICD-10-CM

## 2021-07-10 DIAGNOSIS — N18.4 ANEMIA OF CHRONIC RENAL FAILURE, STAGE 4 (SEVERE) (HCC): ICD-10-CM

## 2021-07-10 DIAGNOSIS — N18.4 CKD (CHRONIC KIDNEY DISEASE), STAGE IV (HCC): ICD-10-CM

## 2021-07-10 DIAGNOSIS — I50.32 CHRONIC DIASTOLIC (CONGESTIVE) HEART FAILURE (HCC): ICD-10-CM

## 2021-07-10 DIAGNOSIS — D63.1 ANEMIA OF CHRONIC RENAL FAILURE, STAGE 4 (SEVERE) (HCC): ICD-10-CM

## 2021-07-10 LAB
ALBUMIN SERPL BCP-MCNC: 2.9 G/DL (ref 3.5–5)
ANION GAP SERPL CALCULATED.3IONS-SCNC: 11 MMOL/L (ref 4–13)
BASOPHILS # BLD AUTO: 0.06 THOUSANDS/ΜL (ref 0–0.1)
BASOPHILS NFR BLD AUTO: 1 % (ref 0–1)
BUN SERPL-MCNC: 49 MG/DL (ref 5–25)
CALCIUM ALBUM COR SERPL-MCNC: 10.3 MG/DL (ref 8.3–10.1)
CALCIUM SERPL-MCNC: 9.4 MG/DL (ref 8.3–10.1)
CHLORIDE SERPL-SCNC: 105 MMOL/L (ref 100–108)
CO2 SERPL-SCNC: 21 MMOL/L (ref 21–32)
CREAT SERPL-MCNC: 3.69 MG/DL (ref 0.6–1.3)
EOSINOPHIL # BLD AUTO: 0.59 THOUSAND/ΜL (ref 0–0.61)
EOSINOPHIL NFR BLD AUTO: 6 % (ref 0–6)
ERYTHROCYTE [DISTWIDTH] IN BLOOD BY AUTOMATED COUNT: 14.3 % (ref 11.6–15.1)
FERRITIN SERPL-MCNC: 546 NG/ML (ref 8–388)
GFR SERPL CREATININE-BSD FRML MDRD: 16 ML/MIN/1.73SQ M
GLUCOSE P FAST SERPL-MCNC: 126 MG/DL (ref 65–99)
HCT VFR BLD AUTO: 35 % (ref 36.5–49.3)
HGB BLD-MCNC: 11.9 G/DL (ref 12–17)
IMM GRANULOCYTES # BLD AUTO: 0.05 THOUSAND/UL (ref 0–0.2)
IMM GRANULOCYTES NFR BLD AUTO: 1 % (ref 0–2)
IRON SATN MFR SERPL: 24 %
IRON SERPL-MCNC: 72 UG/DL (ref 65–175)
LYMPHOCYTES # BLD AUTO: 0.97 THOUSANDS/ΜL (ref 0.6–4.47)
LYMPHOCYTES NFR BLD AUTO: 9 % (ref 14–44)
MAGNESIUM SERPL-MCNC: 2.5 MG/DL (ref 1.6–2.6)
MCH RBC QN AUTO: 31.6 PG (ref 26.8–34.3)
MCHC RBC AUTO-ENTMCNC: 34 G/DL (ref 31.4–37.4)
MCV RBC AUTO: 93 FL (ref 82–98)
MONOCYTES # BLD AUTO: 0.7 THOUSAND/ΜL (ref 0.17–1.22)
MONOCYTES NFR BLD AUTO: 7 % (ref 4–12)
NEUTROPHILS # BLD AUTO: 7.96 THOUSANDS/ΜL (ref 1.85–7.62)
NEUTS SEG NFR BLD AUTO: 76 % (ref 43–75)
NRBC BLD AUTO-RTO: 0 /100 WBCS
PHOSPHATE SERPL-MCNC: 4.7 MG/DL (ref 2.3–4.1)
PLATELET # BLD AUTO: 308 THOUSANDS/UL (ref 149–390)
PMV BLD AUTO: 9.7 FL (ref 8.9–12.7)
POTASSIUM SERPL-SCNC: 4.3 MMOL/L (ref 3.5–5.3)
PTH-INTACT SERPL-MCNC: 135.5 PG/ML (ref 18.4–80.1)
RBC # BLD AUTO: 3.77 MILLION/UL (ref 3.88–5.62)
SODIUM SERPL-SCNC: 137 MMOL/L (ref 136–145)
TIBC SERPL-MCNC: 296 UG/DL (ref 250–450)
URATE SERPL-MCNC: 4.6 MG/DL (ref 4.2–8)
WBC # BLD AUTO: 10.33 THOUSAND/UL (ref 4.31–10.16)

## 2021-07-10 PROCEDURE — 83970 ASSAY OF PARATHORMONE: CPT

## 2021-07-10 PROCEDURE — 36415 COLL VENOUS BLD VENIPUNCTURE: CPT

## 2021-07-10 PROCEDURE — 84550 ASSAY OF BLOOD/URIC ACID: CPT

## 2021-07-10 PROCEDURE — 83540 ASSAY OF IRON: CPT

## 2021-07-10 PROCEDURE — 85025 COMPLETE CBC W/AUTO DIFF WBC: CPT

## 2021-07-10 PROCEDURE — 83735 ASSAY OF MAGNESIUM: CPT

## 2021-07-10 PROCEDURE — 83550 IRON BINDING TEST: CPT

## 2021-07-10 PROCEDURE — 80069 RENAL FUNCTION PANEL: CPT

## 2021-07-10 PROCEDURE — 82728 ASSAY OF FERRITIN: CPT

## 2021-07-13 ENCOUNTER — TELEPHONE (OUTPATIENT)
Dept: NEPHROLOGY | Facility: HOSPITAL | Age: 66
End: 2021-07-13

## 2021-07-13 NOTE — TELEPHONE ENCOUNTER
----- Message from Antelmo Blancas DO sent at 7/12/2021  4:05 PM EDT -----  His creatinine is stable at 3 7 corrected calcium is slightly elevated at 10 3 iron saturation and hemoglobin is stable no changes to current medication

## 2021-07-13 NOTE — TELEPHONE ENCOUNTER
I spoke to the patient and he is aware his kidney function, Hgb, and Iron are all stable, calcium a little elevated  No changes in medications at this time  He had no further questions or concerns

## 2021-07-16 DIAGNOSIS — E83.39 HYPERPHOSPHATEMIA: ICD-10-CM

## 2021-07-17 NOTE — TELEPHONE ENCOUNTER
Requested through [facility] by Lidia Faith  Can you please request a refill for Radha Beaucahmp for his TAMSULOSIN HCL 0 4 MG CAPSULE with a 90 day supply   thanks, Lidia Campos normal sinus rhythm

## 2021-07-19 ENCOUNTER — TELEPHONE (OUTPATIENT)
Dept: PULMONOLOGY | Facility: CLINIC | Age: 66
End: 2021-07-19

## 2021-07-19 RX ORDER — CALCIUM ACETATE 667 MG/1
667 CAPSULE ORAL
Qty: 180 CAPSULE | Refills: 1 | Status: SHIPPED | OUTPATIENT
Start: 2021-07-19 | End: 2021-10-08

## 2021-07-19 NOTE — TELEPHONE ENCOUNTER
----- Message from Hardy Peek sent at 2021 12:17 PM EDT -----  Regarding: Non-Urgent Medical Question  Contact: [phone]  Dr Eva Zimmerman office  This is Lia George contacting you for Antony Ellis  1955  Alisha Ramirez and  I had contacted your office when we 1st found out about the Walden Becerra recall  Alisha Ramirez was contacted by one your doctor's and at that time Alisha Ramirez decided to not continue to use his CPAP  However, at this point, he is not sleeping well and we are not comfortable with him not using a CPAP because of the health risks of not using it  We are asking if Dr Nissa Cyr or another doctor at your office will help Alisha Ramirez get a CPAP that [facility] himself and that Alisha Ramirez can get set up to start using at home  This recall,  without providing replacement equipment to the users, is absolutely unacceptable and unless you know that the repair work is near completion and that Alisha Ramirez will again be able to use his [employer]den Becerra CPAP  very soon , we would like to purchase a  CPAP for [facility]  Alisha Ramirez can be called at [phone]  Thank you in advance  for your assistance   Kristie Fuentes and [facility]

## 2021-07-21 NOTE — TELEPHONE ENCOUNTER
Spoke with wife Yeimi Long explained that due to Stiven Gallegochaim just receiving cpap in 2018 insurance would not cover until 2023 for new one  Also per Jeni Williamson order for new machine out of pocket can not be purchased under the recall circumstances  He would have to purchase one from another DME  Provided her with Melissa and Monika Brii number to try and see if she can get any assistance for Stiven Camacho  New order is placed, will wait for [place] call with decision of DME to send to

## 2021-07-27 ENCOUNTER — PATIENT MESSAGE (OUTPATIENT)
Dept: PULMONOLOGY | Facility: CLINIC | Age: 66
End: 2021-07-27

## 2021-07-27 NOTE — TELEPHONE ENCOUNTER
From: Eladia Lassiter  To: Reji Dugan MD  Sent: 7/27/2021 2:12 PM EDT  Subject: Prescription Question    HI  2 quick questions  Will Dr Flaco Arevalo say ok for [facility] to get a ResMed Airsense 10 AutoSet CPAP Machine with Humidair? If so, can we get the script that devendra (sp) said the doctor had put into the system for Annette Barry? Brennan's sleeping is terrible and I will never forget the day I met Dr Flaco Arevalo when Annette Barry was in the ICU and Dr Flaco Arevalo said "this man needs a sleep study ASAP as peolpe like him die in their sleep" I, his wife, feel desperate to get Venepema Barry an adequate replacement CPAP until Pb Tri gets to his thru this recall   Thank you, Denver Hair

## 2021-07-31 ENCOUNTER — APPOINTMENT (OUTPATIENT)
Dept: LAB | Facility: CLINIC | Age: 66
End: 2021-07-31
Payer: MEDICARE

## 2021-07-31 DIAGNOSIS — Z79.4 TYPE 2 DIABETES MELLITUS WITH STAGE 4 CHRONIC KIDNEY DISEASE, WITH LONG-TERM CURRENT USE OF INSULIN (HCC): ICD-10-CM

## 2021-07-31 DIAGNOSIS — N18.4 TYPE 2 DIABETES MELLITUS WITH STAGE 4 CHRONIC KIDNEY DISEASE, WITH LONG-TERM CURRENT USE OF INSULIN (HCC): ICD-10-CM

## 2021-07-31 DIAGNOSIS — E11.22 TYPE 2 DIABETES MELLITUS WITH STAGE 4 CHRONIC KIDNEY DISEASE, WITH LONG-TERM CURRENT USE OF INSULIN (HCC): ICD-10-CM

## 2021-07-31 LAB
ALBUMIN SERPL BCP-MCNC: 3.1 G/DL (ref 3.5–5)
ALP SERPL-CCNC: 101 U/L (ref 46–116)
ALT SERPL W P-5'-P-CCNC: 25 U/L (ref 12–78)
ANION GAP SERPL CALCULATED.3IONS-SCNC: 6 MMOL/L (ref 4–13)
AST SERPL W P-5'-P-CCNC: 22 U/L (ref 5–45)
BILIRUB SERPL-MCNC: 0.53 MG/DL (ref 0.2–1)
BUN SERPL-MCNC: 64 MG/DL (ref 5–25)
CALCIUM ALBUM COR SERPL-MCNC: 10.2 MG/DL (ref 8.3–10.1)
CALCIUM SERPL-MCNC: 9.5 MG/DL (ref 8.3–10.1)
CHLORIDE SERPL-SCNC: 103 MMOL/L (ref 100–108)
CHOLEST SERPL-MCNC: 132 MG/DL (ref 50–200)
CO2 SERPL-SCNC: 25 MMOL/L (ref 21–32)
CREAT SERPL-MCNC: 3.8 MG/DL (ref 0.6–1.3)
CREAT UR-MCNC: 80.1 MG/DL
EST. AVERAGE GLUCOSE BLD GHB EST-MCNC: 131 MG/DL
GFR SERPL CREATININE-BSD FRML MDRD: 15 ML/MIN/1.73SQ M
GLUCOSE P FAST SERPL-MCNC: 125 MG/DL (ref 65–99)
HBA1C MFR BLD: 6.2 %
HDLC SERPL-MCNC: 32 MG/DL
LDLC SERPL CALC-MCNC: 76 MG/DL (ref 0–100)
MICROALBUMIN UR-MCNC: 3570 MG/L (ref 0–20)
MICROALBUMIN/CREAT 24H UR: 4457 MG/G CREATININE (ref 0–30)
NONHDLC SERPL-MCNC: 100 MG/DL
POTASSIUM SERPL-SCNC: 4.4 MMOL/L (ref 3.5–5.3)
PROT SERPL-MCNC: 7.9 G/DL (ref 6.4–8.2)
SODIUM SERPL-SCNC: 134 MMOL/L (ref 136–145)
TRIGL SERPL-MCNC: 122 MG/DL

## 2021-07-31 PROCEDURE — 36415 COLL VENOUS BLD VENIPUNCTURE: CPT

## 2021-07-31 PROCEDURE — 82570 ASSAY OF URINE CREATININE: CPT

## 2021-07-31 PROCEDURE — 83036 HEMOGLOBIN GLYCOSYLATED A1C: CPT

## 2021-07-31 PROCEDURE — 80061 LIPID PANEL: CPT

## 2021-07-31 PROCEDURE — 80053 COMPREHEN METABOLIC PANEL: CPT

## 2021-07-31 PROCEDURE — 82043 UR ALBUMIN QUANTITATIVE: CPT

## 2021-08-03 DIAGNOSIS — D50.9 IRON DEFICIENCY ANEMIA, UNSPECIFIED IRON DEFICIENCY ANEMIA TYPE: ICD-10-CM

## 2021-08-04 DIAGNOSIS — N18.30 TYPE 2 DIABETES MELLITUS WITH STAGE 3 CHRONIC KIDNEY DISEASE, WITH LONG-TERM CURRENT USE OF INSULIN (HCC): ICD-10-CM

## 2021-08-04 DIAGNOSIS — D50.9 IRON DEFICIENCY ANEMIA, UNSPECIFIED IRON DEFICIENCY ANEMIA TYPE: ICD-10-CM

## 2021-08-04 DIAGNOSIS — Z79.4 TYPE 2 DIABETES MELLITUS WITH STAGE 3 CHRONIC KIDNEY DISEASE, WITH LONG-TERM CURRENT USE OF INSULIN (HCC): ICD-10-CM

## 2021-08-04 DIAGNOSIS — E11.22 TYPE 2 DIABETES MELLITUS WITH STAGE 3 CHRONIC KIDNEY DISEASE, WITH LONG-TERM CURRENT USE OF INSULIN (HCC): ICD-10-CM

## 2021-08-04 RX ORDER — FAMOTIDINE 40 MG/1
40 TABLET, FILM COATED ORAL DAILY
Qty: 90 TABLET | Refills: 0 | Status: CANCELLED | OUTPATIENT
Start: 2021-08-04

## 2021-08-04 RX ORDER — FAMOTIDINE 40 MG/1
TABLET, FILM COATED ORAL
Qty: 90 TABLET | Refills: 3 | Status: SHIPPED | OUTPATIENT
Start: 2021-08-04

## 2021-08-04 RX ORDER — PEN NEEDLE, DIABETIC 32GX 5/32"
NEEDLE, DISPOSABLE MISCELLANEOUS
Qty: 200 EACH | Refills: 0 | Status: SHIPPED | OUTPATIENT
Start: 2021-08-04 | End: 2022-02-01 | Stop reason: SDUPTHER

## 2021-08-10 ENCOUNTER — TELEPHONE (OUTPATIENT)
Dept: ENDOCRINOLOGY | Facility: CLINIC | Age: 66
End: 2021-08-10

## 2021-08-10 NOTE — TELEPHONE ENCOUNTER
LM for pt that we have followup appts for [facility] AT [place]   Asked him to call back to see if he would like one

## 2021-08-18 ENCOUNTER — OFFICE VISIT (OUTPATIENT)
Dept: ENDOCRINOLOGY | Facility: CLINIC | Age: 66
End: 2021-08-18
Payer: MEDICARE

## 2021-08-18 VITALS
WEIGHT: 212.4 LBS | OXYGEN SATURATION: 96 % | HEART RATE: 87 BPM | BODY MASS INDEX: 30.41 KG/M2 | HEIGHT: 70 IN | DIASTOLIC BLOOD PRESSURE: 62 MMHG | SYSTOLIC BLOOD PRESSURE: 144 MMHG

## 2021-08-18 DIAGNOSIS — Z89.421 S/P AMPUTATION OF LESSER TOE, RIGHT (HCC): ICD-10-CM

## 2021-08-18 DIAGNOSIS — N18.4 TYPE 2 DIABETES MELLITUS WITH STAGE 4 CHRONIC KIDNEY DISEASE, WITH LONG-TERM CURRENT USE OF INSULIN (HCC): Primary | ICD-10-CM

## 2021-08-18 DIAGNOSIS — E78.2 MIXED HYPERLIPIDEMIA: ICD-10-CM

## 2021-08-18 DIAGNOSIS — G47.33 OSA (OBSTRUCTIVE SLEEP APNEA): ICD-10-CM

## 2021-08-18 DIAGNOSIS — Z79.4 TYPE 2 DIABETES MELLITUS WITH STAGE 4 CHRONIC KIDNEY DISEASE, WITH LONG-TERM CURRENT USE OF INSULIN (HCC): Primary | ICD-10-CM

## 2021-08-18 DIAGNOSIS — E11.22 TYPE 2 DIABETES MELLITUS WITH STAGE 4 CHRONIC KIDNEY DISEASE, WITH LONG-TERM CURRENT USE OF INSULIN (HCC): Primary | ICD-10-CM

## 2021-08-18 DIAGNOSIS — I10 ESSENTIAL HYPERTENSION: ICD-10-CM

## 2021-08-18 PROBLEM — E11.40 CHRONIC PAINFUL DIABETIC NEUROPATHY (HCC): Status: ACTIVE | Noted: 2020-06-12

## 2021-08-18 PROBLEM — Z89.429 ABSENCE OF TOE (HCC): Status: ACTIVE | Noted: 2021-07-23

## 2021-08-18 PROBLEM — N18.30 STAGE 3 CHRONIC KIDNEY DISEASE (HCC): Status: RESOLVED | Noted: 2020-09-23 | Resolved: 2021-08-18

## 2021-08-18 PROBLEM — B35.1 ONYCHOMYCOSIS: Status: ACTIVE | Noted: 2020-07-02

## 2021-08-18 PROCEDURE — 99214 OFFICE O/P EST MOD 30 MIN: CPT | Performed by: NURSE PRACTITIONER

## 2021-08-18 NOTE — H&P (VIEW-ONLY)
Established Patient Progress Note      Chief Complaint   Patient presents with    Diabetes Type 2        History of Present Illness:   Charity Reeves is a 77 y o  male with HTN, HLD, NICOLASA, and type 2 diabetes with long term use of insulin  Reports complications of neuropathy and CKD with macroalbuminuria  Denies recent illness or hospitalizations  Denies recent severe hypoglycemic or severe hyperglycemic episodes  Denies any issues with his current regimen  home glucose monitoring: are performed sporadically, approximately 4-5x weekly    Patient was last seen on 2/2/21 via telemedicine with Dr Yola Burns  No changes were made to his medication regimen at that time  Patient reports feeling well  He continues to follow closely with nephrology for CKD  He is eating well and engaging in regular physical activity  Home blood glucose readings per report: 120-130    Component      Latest Ref Rng & Units 1/11/2021 7/31/2021              Hemoglobin A1C      Normal 3 8-5 6%; PreDiabetic 5 7-6 4%; Diabetic >=6 5%; Glycemic control for adults with diabetes <7 0% % 6 0 (H) 6 2 (H)   eAG, EST AVG Glucose      mg/dl 126 131        Current regimen:   Xultophy 19 units daily    Last Eye Exam: [facility] 3/16/2021  Last Foot Exam: twice monthly; Dr Tyrone Rawls    For vitamin-D deficiency, he is supplementing with 1000 units daily  For hypertension, he is taking 10 mg of amlodipine daily and 50 mg of metoprolol tartrate every 12 hours  He denies headache, pedal edema, and orthostatic hypotension  For hyperlipidemia, he is taking 20 mg of simvastatin along with 2 g of Lovaza daily  He denies myalgias  For obstructive sleep apnea, he is using his CPAP regularly      Patient Active Problem List   Diagnosis    Bilateral leg edema    Diabetes mellitus with neuropathy (HCC)    Easy bruising    Eczema    Erectile dysfunction of non-organic origin    Gout    Hyperlipidemia    Uremia    Arthritis    Peripheral arterial disease ([place] [address] )    PVCs (premature ventricular contractions)    Rhinitis    Systolic murmur    Vertigo    Complete heart block (HCC)    Metabolic acidosis    Other hyperlipidemia    Severe sepsis (HCC)    PAF (paroxysmal atrial fibrillation) ([facility])    Acute respiratory failure with hypoxia (HCC)    Persistent proteinuria    Volume overload    Urinary retention    NICOLASA (obstructive sleep apnea)    Type 2 diabetes mellitus with chronic kidney disease, with long-term current use of insulin (HCC)    Hypertension    Stage 4 chronic kidney disease ([facility])    Nonrheumatic aortic valve stenosis    Anemia    Fever    Mitral valve stenosis    Abnormal CT of the chest    Acute pulmonary edema ([facility])    Chronic diastolic (congestive) heart failure ([facility])    Left renal artery stenosis ([facility])    S/P amputation of lesser toe, left ([facility])    S/P amputation of lesser toe, right (HCC)    Elevated troponin I level    Staphylococcus aureus bacteremia    Type 2 diabetes mellitus with diabetic neuropathy, without long-term current use of insulin ([facility])    Iron deficiency anemia    Anxiety    Osteomyelitis of left foot (HCC)    Amputation of left great toe (HCC)    Multiple drug resistant organism (MDRO) culture positive    Diabetic kidney disease ([place] [address] )    Diabetes mellitus ([place] [address] )    Renovascular hypertension    Acute renal failure (ARF) ([facility])    SSS (sick sinus syndrome) ([facility])    Chronic obstructive pulmonary disease (HCC)    Absence of toe (HCC)    Chronic painful diabetic neuropathy ([facility])    Onychomycosis      Past Medical History:   Diagnosis Date    Anemia     iron def    Arthritis     OA    Bruise of both arms     forearms and both hands    Bruises easily     CHF (congestive heart failure) (HCC)     Chronic kidney disease     CPAP (continuous positive airway pressure) dependence     Diabetes mellitus (HCC)     Diabetic foot ulcer (HCC)     Eczema     Erectile dysfunction     Gout     Heart murmur     Hyperlipidemia     Hypertension     Hypoglycemia 3/8/2019    Murmur     Nephropathy     Osteoarthritis     PAD (peripheral artery disease) (HCC)     Seasonal allergies     Sleep apnea     Toe infection     bilat great toes    Vertigo     Walks frequently     Wears dentures     upper    Wears glasses       Past Surgical History:   Procedure Laterality Date    CARDIAC PACEMAKER PLACEMENT      CARPAL TUNNEL RELEASE Left     CARPAL TUNNEL RELEASE Right     CARPAL TUNNEL RELEASE      COLONOSCOPY  08/2020    FOOT AMPUTATION Left 2/10/2020    Procedure: PARTIAL 1ST RAY AMPUTATION;  Surgeon: Casey West DPM;  [facility];  Service: Podiatry    INCISION AND DRAINAGE OF WOUND Left 1/12/2020    Procedure: INCISION AND DRAINAGE (I&D) EXTREMITY AND REMOVAL OF SESMOID BONE;  Surgeon: Gerber Shine DPM;  [facility];  Service: Podiatry    IR PICC REPOSITION  1/14/2020    KNEE ARTHROSCOPY Left     KNEE ARTHROSCOPY Right     KNEE SURGERY      AR AMPUTATION TOE,I-P JT Bilateral 5/2/2017    Procedure: PARTIAL AMPUTATION RIGHT AND LEFT HALLUX ;  Surgeon: Casey West DPM;  [facility];  Service: Podiatry    AR AMPUTATION TOE,MT-P JT Left 7/25/2017    Procedure: 2ND TOE AMPUTATION;  Surgeon: Casey West DPM;  [facility];  Service: Podiatry    SHOULDER ARTHROSCOPY Left     with screws,RTC    SHOULDER SURGERY      TOE AMPUTATION Left 1/8/2020    Procedure: HALLUX AMPUTATION;  Surgeon: Gerber Shine DPM;  [facility];  Service: Podiatry    UPPER GASTROINTESTINAL ENDOSCOPY  03/2020    Intestinal metaplasia prepyloric    VASECTOMY        Family History   Problem Relation Age of Onset    Heart disease Father         passed away    Hypertension Father     Pulmonary embolism Father     Hypertension Mother         assed away     Social History     Tobacco Use    Smoking status: Former Smoker     Packs/day: 0 50     Years: 20 00     Pack years: 10  00     Types: Cigarettes     Start date: 1     Quit date: 2010     Years since quitting: 10 8    Smokeless tobacco: Never Used    Tobacco comment: quit 10 years ago   Substance Use Topics    Alcohol use: No     Allergies   Allergen Reactions    Invokana [Canagliflozin] Other (See Comments)     Caused circulation problems    Lamisil [Terbinafine] Blisters     Wife states " His skin peeled from head to toe"    Other Swelling     Pomegranate - facial swelling, no swelling of tongue, esophagus  Adhesive tape        Latex Rash         Current Outpatient Medications:     allopurinol (ZYLOPRIM) 300 mg tablet, TAKE 1 TABLET BY MOUTH EVERY DAY IN THE MORNING, Disp: 90 tablet, Rfl: 1    amLODIPine (NORVASC) 10 mg tablet, Take 1 tablet (10 mg total) by mouth daily, Disp: 90 tablet, Rfl: 3    apixaban (ELIQUIS) 5 mg, Take 1 tablet (5 mg total) by mouth every 12 (twelve) hours, Disp: 60 tablet, Rfl: 11    betamethasone valerate (VALISONE) 0 1 % cream, as needed , Disp: , Rfl: 3    calcium acetate (PHOSLO) capsule, TAKE 1 CAPSULE (667 MG TOTAL) BY MOUTH 3 (THREE) TIMES A DAY WITH MEALS, Disp: 180 capsule, Rfl: 1    Cholecalciferol (VITAMIN D-3) 1000 units CAPS, Take 1 capsule by mouth every morning  , Disp: , Rfl:     Dupilumab (Dupixent) 300 MG/2ML SOPN, Inject 300 mg under the skin Once every 2 weeks, Disp: , Rfl:     famotidine (PEPCID) 40 MG tablet, TAKE 1 TABLET BY MOUTH EVERY DAY, Disp: 90 tablet, Rfl: 3    Insulin Degludec-Liraglutide (Insulin Degludec-Liraglutide) 100 units-3 6 mg/mL injection pen, Inject 19 Units under the skin daily, Disp: 5 pen, Rfl: 3    Insulin Pen Needle (BD Pen Needle Zenaida U/F) 32G X 4 MM MISC, Use 1 daily, Disp: 200 each, Rfl: 0    metolazone (ZAROXOLYN) 5 mg tablet, Take 1 tablet (5 mg total) by mouth see administration instructions Take every 6 days prn as directed by physician for wt gain/edema, Disp: 5 tablet, Rfl: 5    metoprolol tartrate (LOPRESSOR) 50 mg tablet, Take 1 tablet (50 mg total) by mouth every 12 (twelve) hours, Disp: 180 tablet, Rfl: 3    Multiple Vitamin (MULTIVITAMIN) tablet, Take 1 tablet by mouth daily IN AM, Disp: , Rfl:     omega-3-acid ethyl esters (LOVAZA) 1 g capsule, Take 2 capsules (2 g total) by mouth 2 (two) times a day Mail print prescription to pt, Disp: 360 capsule, Rfl: 3    OneTouch Ultra test strip, USE TO TEST BLOOD SUGAR 3 TIMES A DAY, Disp: 100 each, Rfl: 3    simvastatin (ZOCOR) 20 mg tablet, Take 1 tablet (20 mg total) by mouth daily at bedtime, Disp: 90 tablet, Rfl: 3    sodium bicarbonate 650 mg tablet, TAKE 1 TABLET (650 MG TOTAL) BY MOUTH 2 (TWO) TIMES DAILY AFTER MEALS, Disp: 180 tablet, Rfl: 1    tamsulosin (FLOMAX) 0 4 mg, TAKE 1 CAPSULE BY MOUTH EVERY DAY WITH DINNER, Disp: 90 capsule, Rfl: 3    torsemide (DEMADEX) 20 mg tablet, Take 1 tablet (20 mg total) by mouth 3 (three) times a day, Disp: 270 tablet, Rfl: 3    Review of Systems   Constitutional: Negative for activity change, appetite change, fatigue and unexpected weight change  HENT: Negative for sore throat, trouble swallowing and voice change  Eyes: Negative for visual disturbance  Respiratory: Negative for cough, chest tightness and shortness of breath  Cardiovascular: Negative for chest pain, palpitations and leg swelling  Gastrointestinal: Negative for constipation, diarrhea, nausea and vomiting  Endocrine: Negative for polydipsia, polyphagia and polyuria  Genitourinary: Negative for frequency  Musculoskeletal: Negative for arthralgias, back pain, gait problem, joint swelling and myalgias  Skin: Negative for wound  Allergic/Immunologic: Positive for environmental allergies  Negative for food allergies  Neurological: Positive for numbness  Negative for dizziness, weakness, light-headedness and headaches  Hematological: Does not bruise/bleed easily     Psychiatric/Behavioral: Negative for decreased concentration, dysphoric mood and sleep disturbance  The patient is not nervous/anxious and is not hyperactive  Physical Exam:  Body mass index is 30 48 kg/m²  /62 (BP Location: Left arm, Cuff Size: Adult)   Pulse 87   Ht 5' 10" (1 778 m)   Wt 96 3 kg (212 lb 6 4 oz)   SpO2 96%   BMI 30 48 kg/m²    Wt Readings from Last 3 Encounters:   08/18/21 96 3 kg (212 lb 6 4 oz)   06/04/21 94 4 kg (208 lb 3 2 oz)   06/01/21 93 1 kg (205 lb 3 2 oz)       Physical Exam  Vitals reviewed  Constitutional:       General: He is not in acute distress  Appearance: He is well-developed  He is not ill-appearing  HENT:      Head: Normocephalic and atraumatic  Comments: Mask in place  Eyes:      Pupils: Pupils are equal, round, and reactive to light  Neck:      Thyroid: No thyromegaly  Cardiovascular:      Rate and Rhythm: Normal rate and regular rhythm  Pulses: Normal pulses  Heart sounds: Murmur heard  Systolic murmur is present with a grade of 3/6  Pulmonary:      Effort: Pulmonary effort is normal       Breath sounds: Normal breath sounds  Abdominal:      General: Bowel sounds are normal  There is no distension  Palpations: Abdomen is soft  Tenderness: There is no abdominal tenderness  Musculoskeletal:      Cervical back: Normal range of motion and neck supple  Right lower leg: No edema  Left lower leg: No edema  Lymphadenopathy:      Cervical: No cervical adenopathy  Skin:     General: Skin is warm and dry  Capillary Refill: Capillary refill takes less than 2 seconds  Neurological:      Mental Status: He is alert and oriented to person, place, and time        Gait: Gait normal    Psychiatric:         Mood and Affect: Mood normal          Behavior: Behavior normal            Labs:   Lab Results   Component Value Date    HGBA1C 6 2 (H) 07/31/2021    HGBA1C 6 0 (H) 01/11/2021    HGBA1C 5 2 12/03/2020     Lab Results   Component Value Date    CREATININE 3 80 (H) 07/31/2021    CREATININE 3 69 (H) 07/10/2021    CREATININE 3 81 (H) 06/24/2021    BUN 64 (H) 07/31/2021     (L) 04/10/2017    K 4 4 07/31/2021     07/31/2021    CO2 25 07/31/2021     eGFR   Date Value Ref Range Status   07/31/2021 15 ml/min/1 73sq m Final     Lab Results   Component Value Date    CHOL 161 12/12/2015    HDL 32 (L) 07/31/2021    TRIG 122 07/31/2021     Lab Results   Component Value Date    ALT 25 07/31/2021    AST 22 07/31/2021    ALKPHOS 101 07/31/2021    BILITOT 0 7 04/10/2017     Lab Results   Component Value Date    MVP7NOXPRXBM 0 504 01/19/2020    SNY1MENHLSBE 3 430 09/19/2019    VXU9LXLVPVWS 4 170 (H) 06/12/2019     Lab Results   Component Value Date    FREET4 0 88 09/19/2019       Impression & Plan:    Problem List Items Addressed This Visit        Endocrine    Diabetes mellitus ([facility] ) - Primary    Relevant Orders    HEMOGLOBIN A1C W/ EAG ESTIMATION Lab Collect       Respiratory    NICOLASA (obstructive sleep apnea)     Continue CPAP  Cardiovascular and Mediastinum    Hypertension     /62  Continue current regimen  Other    Hyperlipidemia     Lipid panel is stable  Continue statin  S/P amputation of lesser toe, right ([facility] )          Orders Placed This Encounter   Procedures    HEMOGLOBIN A1C W/ EAG ESTIMATION Lab Collect     Standing Status:   Future     Standing Expiration Date:   8/18/2022       There are no Patient Instructions on file for this visit  Discussed with the patient and all questioned fully answered  He will call me if any problems arise  Follow-up appointment in 6 months       Counseled patient on diagnostic results, prognosis, risk and benefit of treatment options, instruction for management, importance of treatment compliance, Risk  factor reduction and impressions    Danielas PAT Guzman

## 2021-08-18 NOTE — PROGRESS NOTES
Established Patient Progress Note      Chief Complaint   Patient presents with    Diabetes Type 2        History of Present Illness:   Jah Laboy is a 77 y o  male with HTN, HLD, NICOLASA, and type 2 diabetes with long term use of insulin  Reports complications of neuropathy and CKD with macroalbuminuria  Denies recent illness or hospitalizations  Denies recent severe hypoglycemic or severe hyperglycemic episodes  Denies any issues with his current regimen  home glucose monitoring: are performed sporadically, approximately 4-5x weekly    Patient was last seen on 2/2/21 via telemedicine with Dr Blandon Dural  No changes were made to his medication regimen at that time  Patient reports feeling well  He continues to follow closely with nephrology for CKD  He is eating well and engaging in regular physical activity  Home blood glucose readings per report: 120-130    Component      Latest Ref Rng & Units 1/11/2021 7/31/2021              Hemoglobin A1C      Normal 3 8-5 6%; PreDiabetic 5 7-6 4%; Diabetic >=6 5%; Glycemic control for adults with diabetes <7 0% % 6 0 (H) 6 2 (H)   eAG, EST AVG Glucose      mg/dl 126 131        Current regimen:   Xultophy 19 units daily    Last Eye Exam: [facility] 3/16/2021  Last Foot Exam: twice monthly; Dr Sean Zamora    For vitamin-D deficiency, he is supplementing with 1000 units daily  For hypertension, he is taking 10 mg of amlodipine daily and 50 mg of metoprolol tartrate every 12 hours  He denies headache, pedal edema, and orthostatic hypotension  For hyperlipidemia, he is taking 20 mg of simvastatin along with 2 g of Lovaza daily  He denies myalgias  For obstructive sleep apnea, he is using his CPAP regularly      Patient Active Problem List   Diagnosis    Bilateral leg edema    Diabetes mellitus with neuropathy (HCC)    Easy bruising    Eczema    Erectile dysfunction of non-organic origin    Gout    Hyperlipidemia    Uremia    Arthritis    Peripheral arterial disease ([place] [address] )    PVCs (premature ventricular contractions)    Rhinitis    Systolic murmur    Vertigo    Complete heart block (HCC)    Metabolic acidosis    Other hyperlipidemia    Severe sepsis (HCC)    [facility]    Acute respiratory failure with hypoxia (HCC)    Persistent proteinuria    Volume overload    Urinary retention    NICOLASA (obstructive sleep apnea)    Type 2 diabetes mellitus with chronic kidney disease, with long-term current use of insulin (HCC)    Hypertension    Stage 4 chronic kidney disease ([facility])    Nonrheumatic aortic valve stenosis    Anemia    Fever    Mitral valve stenosis    Abnormal CT of the chest    Acute pulmonary edema ([facility])    Chronic diastolic (congestive) heart failure ([facility])    Left renal artery stenosis ([facility])    S/P amputation of lesser toe, left ([facility])    S/P amputation of lesser toe, right (HCC)    Elevated troponin I level    Staphylococcus aureus bacteremia    Type 2 diabetes mellitus with diabetic neuropathy, without long-term current use of insulin ([facility])    Iron deficiency anemia    Anxiety    Osteomyelitis of left foot (HCC)    Amputation of left great toe (HCC)    Multiple drug resistant organism (MDRO) culture positive    Diabetic kidney disease ([place] [address] )    Diabetes mellitus ([place] [address] )    Renovascular hypertension    Acute renal failure (ARF) ([facility])    SSS (sick sinus syndrome) ([facility])    Chronic obstructive pulmonary disease (HCC)    Absence of toe (HCC)    Chronic painful diabetic neuropathy ([facility])    Onychomycosis      Past Medical History:   Diagnosis Date    Anemia     iron def    Arthritis     OA    Bruise of both arms     forearms and both hands    Bruises easily     CHF (congestive heart failure) (HCC)     Chronic kidney disease     CPAP (continuous positive airway pressure) dependence     Diabetes mellitus (HCC)     Diabetic foot ulcer (HCC)     Eczema     Erectile dysfunction     Gout     Heart murmur     Hyperlipidemia     Hypertension     Hypoglycemia 3/8/2019    Murmur     Nephropathy     Osteoarthritis     PAD (peripheral artery disease) (HCC)     Seasonal allergies     Sleep apnea     Toe infection     bilat great toes    Vertigo     Walks frequently     Wears dentures     upper    Wears glasses       Past Surgical History:   Procedure Laterality Date    CARDIAC PACEMAKER PLACEMENT      CARPAL TUNNEL RELEASE Left     CARPAL TUNNEL RELEASE Right     CARPAL TUNNEL RELEASE      COLONOSCOPY  08/2020    FOOT AMPUTATION Left 2/10/2020    Procedure: PARTIAL 1ST RAY AMPUTATION;  Surgeon: Kenneth Mckeon DPM;  [facility];  Service: Podiatry    INCISION AND DRAINAGE OF WOUND Left 1/12/2020    Procedure: INCISION AND DRAINAGE (I&D) EXTREMITY AND REMOVAL OF SESMOID BONE;  Surgeon: Jess Zafar DPM;  [facility];  Service: Podiatry    IR PICC REPOSITION  1/14/2020    KNEE ARTHROSCOPY Left     KNEE ARTHROSCOPY Right     KNEE SURGERY      CO AMPUTATION TOE,I-P JT Bilateral 5/2/2017    Procedure: PARTIAL AMPUTATION RIGHT AND LEFT HALLUX ;  Surgeon: Kenneth Mckeon DPM;  [facility];  Service: Podiatry    CO AMPUTATION TOE,MT-P JT Left 7/25/2017    Procedure: 2ND TOE AMPUTATION;  Surgeon: Kenneth Mckeon DPM;  [facility];  Service: Podiatry    SHOULDER ARTHROSCOPY Left     with screws,RTC    SHOULDER SURGERY      TOE AMPUTATION Left 1/8/2020    Procedure: HALLUX AMPUTATION;  Surgeon: Jess Zafar DPM;  [facility];  Service: Podiatry    UPPER GASTROINTESTINAL ENDOSCOPY  03/2020    Intestinal metaplasia prepyloric    VASECTOMY        Family History   Problem Relation Age of Onset    Heart disease Father         passed away    Hypertension Father     Pulmonary embolism Father     Hypertension Mother         assed away     Social History     Tobacco Use    Smoking status: Former Smoker     Packs/day: 0 50     Years: 20 00     Pack years: 10  00     Types: Cigarettes     Start date: 1     Quit date: 2010     Years since quitting: 10 8    Smokeless tobacco: Never Used    Tobacco comment: quit 10 years ago   Substance Use Topics    Alcohol use: No     Allergies   Allergen Reactions    Invokana [Canagliflozin] Other (See Comments)     Caused circulation problems    Lamisil [Terbinafine] Blisters     Wife states " His skin peeled from head to toe"    Other Swelling     Pomegranate - facial swelling, no swelling of tongue, esophagus  Adhesive tape        Latex Rash         Current Outpatient Medications:     allopurinol (ZYLOPRIM) 300 mg tablet, TAKE 1 TABLET BY MOUTH EVERY DAY IN THE MORNING, Disp: 90 tablet, Rfl: 1    amLODIPine (NORVASC) 10 mg tablet, Take 1 tablet (10 mg total) by mouth daily, Disp: 90 tablet, Rfl: 3    apixaban (ELIQUIS) 5 mg, Take 1 tablet (5 mg total) by mouth every 12 (twelve) hours, Disp: 60 tablet, Rfl: 11    betamethasone valerate (VALISONE) 0 1 % cream, as needed , Disp: , Rfl: 3    calcium acetate (PHOSLO) capsule, TAKE 1 CAPSULE (667 MG TOTAL) BY MOUTH 3 (THREE) TIMES A DAY WITH MEALS, Disp: 180 capsule, Rfl: 1    Cholecalciferol (VITAMIN D-3) 1000 units CAPS, Take 1 capsule by mouth every morning  , Disp: , Rfl:     Dupilumab (Dupixent) 300 MG/2ML SOPN, Inject 300 mg under the skin Once every 2 weeks, Disp: , Rfl:     famotidine (PEPCID) 40 MG tablet, TAKE 1 TABLET BY MOUTH EVERY DAY, Disp: 90 tablet, Rfl: 3    Insulin Degludec-Liraglutide (Insulin Degludec-Liraglutide) 100 units-3 6 mg/mL injection pen, Inject 19 Units under the skin daily, Disp: 5 pen, Rfl: 3    Insulin Pen Needle (BD Pen Needle Zenaida U/F) 32G X 4 MM MISC, Use 1 daily, Disp: 200 each, Rfl: 0    metolazone (ZAROXOLYN) 5 mg tablet, Take 1 tablet (5 mg total) by mouth see administration instructions Take every 6 days prn as directed by physician for wt gain/edema, Disp: 5 tablet, Rfl: 5    metoprolol tartrate (LOPRESSOR) 50 mg tablet, Take 1 tablet (50 mg total) by mouth every 12 (twelve) hours, Disp: 180 tablet, Rfl: 3    Multiple Vitamin (MULTIVITAMIN) tablet, Take 1 tablet by mouth daily IN AM, Disp: , Rfl:     omega-3-acid ethyl esters (LOVAZA) 1 g capsule, Take 2 capsules (2 g total) by mouth 2 (two) times a day Mail print prescription to pt, Disp: 360 capsule, Rfl: 3    OneTouch Ultra test strip, USE TO TEST BLOOD SUGAR 3 TIMES A DAY, Disp: 100 each, Rfl: 3    simvastatin (ZOCOR) 20 mg tablet, Take 1 tablet (20 mg total) by mouth daily at bedtime, Disp: 90 tablet, Rfl: 3    sodium bicarbonate 650 mg tablet, TAKE 1 TABLET (650 MG TOTAL) BY MOUTH 2 (TWO) TIMES DAILY AFTER MEALS, Disp: 180 tablet, Rfl: 1    tamsulosin (FLOMAX) 0 4 mg, TAKE 1 CAPSULE BY MOUTH EVERY DAY WITH DINNER, Disp: 90 capsule, Rfl: 3    torsemide (DEMADEX) 20 mg tablet, Take 1 tablet (20 mg total) by mouth 3 (three) times a day, Disp: 270 tablet, Rfl: 3    Review of Systems   Constitutional: Negative for activity change, appetite change, fatigue and unexpected weight change  HENT: Negative for sore throat, trouble swallowing and voice change  Eyes: Negative for visual disturbance  Respiratory: Negative for cough, chest tightness and shortness of breath  Cardiovascular: Negative for chest pain, palpitations and leg swelling  Gastrointestinal: Negative for constipation, diarrhea, nausea and vomiting  Endocrine: Negative for polydipsia, polyphagia and polyuria  Genitourinary: Negative for frequency  Musculoskeletal: Negative for arthralgias, back pain, gait problem, joint swelling and myalgias  Skin: Negative for wound  Allergic/Immunologic: Positive for environmental allergies  Negative for food allergies  Neurological: Positive for numbness  Negative for dizziness, weakness, light-headedness and headaches  Hematological: Does not bruise/bleed easily     Psychiatric/Behavioral: Negative for decreased concentration, dysphoric mood and sleep disturbance  The patient is not nervous/anxious and is not hyperactive  Physical Exam:  Body mass index is 30 48 kg/m²  /62 (BP Location: Left arm, Cuff Size: Adult)   Pulse 87   Ht 5' 10" (1 778 m)   Wt 96 3 kg (212 lb 6 4 oz)   SpO2 96%   BMI 30 48 kg/m²    Wt Readings from Last 3 Encounters:   08/18/21 96 3 kg (212 lb 6 4 oz)   06/04/21 94 4 kg (208 lb 3 2 oz)   06/01/21 93 1 kg (205 lb 3 2 oz)       Physical Exam  Vitals reviewed  Constitutional:       General: He is not in acute distress  Appearance: He is well-developed  He is not ill-appearing  HENT:      Head: Normocephalic and atraumatic  Comments: Mask in place  Eyes:      Pupils: Pupils are equal, round, and reactive to light  Neck:      Thyroid: No thyromegaly  Cardiovascular:      Rate and Rhythm: Normal rate and regular rhythm  Pulses: Normal pulses  Heart sounds: Murmur heard  Systolic murmur is present with a grade of 3/6  Pulmonary:      Effort: Pulmonary effort is normal       Breath sounds: Normal breath sounds  Abdominal:      General: Bowel sounds are normal  There is no distension  Palpations: Abdomen is soft  Tenderness: There is no abdominal tenderness  Musculoskeletal:      Cervical back: Normal range of motion and neck supple  Right lower leg: No edema  Left lower leg: No edema  Lymphadenopathy:      Cervical: No cervical adenopathy  Skin:     General: Skin is warm and dry  Capillary Refill: Capillary refill takes less than 2 seconds  Neurological:      Mental Status: He is alert and oriented to person, place, and time        Gait: Gait normal    Psychiatric:         Mood and Affect: Mood normal          Behavior: Behavior normal            Labs:   Lab Results   Component Value Date    HGBA1C 6 2 (H) 07/31/2021    HGBA1C 6 0 (H) 01/11/2021    HGBA1C 5 2 12/03/2020     Lab Results   Component Value Date    CREATININE 3 80 (H) 07/31/2021    CREATININE 3 69 (H) 07/10/2021    CREATININE 3 81 (H) 06/24/2021    BUN 64 (H) 07/31/2021     (L) 04/10/2017    K 4 4 07/31/2021     07/31/2021    CO2 25 07/31/2021     eGFR   Date Value Ref Range Status   07/31/2021 15 ml/min/1 73sq m Final     Lab Results   Component Value Date    CHOL 161 12/12/2015    HDL 32 (L) 07/31/2021    TRIG 122 07/31/2021     Lab Results   Component Value Date    ALT 25 07/31/2021    AST 22 07/31/2021    ALKPHOS 101 07/31/2021    BILITOT 0 7 04/10/2017     Lab Results   Component Value Date    HXO8KJQBEKUN 0 504 01/19/2020    ATR9WSGTIQZQ 3 430 09/19/2019    BDF2QIQDWMUR 4 170 (H) 06/12/2019     Lab Results   Component Value Date    FREET4 0 88 09/19/2019       Impression & Plan:    Problem List Items Addressed This Visit        Endocrine    Diabetes mellitus ([facility] ) - Primary    Relevant Orders    HEMOGLOBIN A1C W/ EAG ESTIMATION Lab Collect       Respiratory    NICOLASA (obstructive sleep apnea)     Continue CPAP  Cardiovascular and Mediastinum    Hypertension     /62  Continue current regimen  Other    Hyperlipidemia     Lipid panel is stable  Continue statin  S/P amputation of lesser toe, right ([facility] [address] )          Orders Placed This Encounter   Procedures    HEMOGLOBIN A1C W/ EAG ESTIMATION Lab Collect     Standing Status:   Future     Standing Expiration Date:   8/18/2022       There are no Patient Instructions on file for this visit  Discussed with the patient and all questioned fully answered  He will call me if any problems arise  Follow-up appointment in 6 months       Counseled patient on diagnostic results, prognosis, risk and benefit of treatment options, instruction for management, importance of treatment compliance, Risk  factor reduction and impressions    PAT Henriquez

## 2021-08-18 NOTE — ASSESSMENT & PLAN NOTE
Patient is very well controlled without episodes of hypoglycemia  Continue current regimen  Patient knows to notify the office with persistent hyperglycemia or episodes of hypoglycemia     Lab Results   Component Value Date    HGBA1C 6 2 (H) 07/31/2021

## 2021-08-25 DIAGNOSIS — N18.4 STAGE 4 CHRONIC KIDNEY DISEASE (HCC): ICD-10-CM

## 2021-08-25 DIAGNOSIS — I15.0 RENOVASCULAR HYPERTENSION: Primary | ICD-10-CM

## 2021-08-26 ENCOUNTER — TELEPHONE (OUTPATIENT)
Dept: GASTROENTEROLOGY | Facility: HOSPITAL | Age: 66
End: 2021-08-26

## 2021-08-27 ENCOUNTER — ANESTHESIA (OUTPATIENT)
Dept: GASTROENTEROLOGY | Facility: HOSPITAL | Age: 66
End: 2021-08-27

## 2021-08-27 ENCOUNTER — HOSPITAL ENCOUNTER (OUTPATIENT)
Dept: GASTROENTEROLOGY | Facility: HOSPITAL | Age: 66
Setting detail: OUTPATIENT SURGERY
Discharge: HOME | End: 2021-08-27
Attending: COLON & RECTAL SURGERY
Payer: MEDICARE

## 2021-08-27 ENCOUNTER — ANESTHESIA EVENT (OUTPATIENT)
Dept: GASTROENTEROLOGY | Facility: HOSPITAL | Age: 66
End: 2021-08-27

## 2021-08-27 VITALS
OXYGEN SATURATION: 94 % | HEART RATE: 60 BPM | DIASTOLIC BLOOD PRESSURE: 65 MMHG | RESPIRATION RATE: 20 BRPM | SYSTOLIC BLOOD PRESSURE: 128 MMHG | TEMPERATURE: 97.3 F

## 2021-08-27 DIAGNOSIS — Z85.038 PERSONAL HISTORY OF COLON CANCER: ICD-10-CM

## 2021-08-27 DIAGNOSIS — Z86.010 PERSONAL HISTORY OF COLONIC POLYPS: ICD-10-CM

## 2021-08-27 LAB — GLUCOSE SERPL-MCNC: 155 MG/DL (ref 65–140)

## 2021-08-27 PROCEDURE — 45385 COLONOSCOPY W/LESION REMOVAL: CPT | Performed by: COLON & RECTAL SURGERY

## 2021-08-27 PROCEDURE — 45381 COLONOSCOPY SUBMUCOUS NJX: CPT | Performed by: COLON & RECTAL SURGERY

## 2021-08-27 PROCEDURE — 82948 REAGENT STRIP/BLOOD GLUCOSE: CPT

## 2021-08-27 PROCEDURE — 88305 TISSUE EXAM BY PATHOLOGIST: CPT | Performed by: PATHOLOGY

## 2021-08-27 RX ORDER — SODIUM CHLORIDE 9 MG/ML
125 INJECTION, SOLUTION INTRAVENOUS CONTINUOUS
Status: DISCONTINUED | OUTPATIENT
Start: 2021-08-27 | End: 2021-08-31 | Stop reason: HOSPADM

## 2021-08-27 RX ORDER — PROPOFOL 10 MG/ML
INJECTION, EMULSION INTRAVENOUS AS NEEDED
Status: DISCONTINUED | OUTPATIENT
Start: 2021-08-27 | End: 2021-08-27

## 2021-08-27 RX ORDER — SODIUM CHLORIDE 9 MG/ML
INJECTION, SOLUTION INTRAVENOUS CONTINUOUS PRN
Status: DISCONTINUED | OUTPATIENT
Start: 2021-08-27 | End: 2021-08-27

## 2021-08-27 RX ORDER — PROPOFOL 10 MG/ML
INJECTION, EMULSION INTRAVENOUS CONTINUOUS PRN
Status: DISCONTINUED | OUTPATIENT
Start: 2021-08-27 | End: 2021-08-27

## 2021-08-27 RX ADMIN — PROPOFOL 100 MG: 10 INJECTION, EMULSION INTRAVENOUS at 10:12

## 2021-08-27 RX ADMIN — SODIUM CHLORIDE: 0.9 INJECTION, SOLUTION INTRAVENOUS at 10:06

## 2021-08-27 RX ADMIN — PROPOFOL 40 MG: 10 INJECTION, EMULSION INTRAVENOUS at 10:20

## 2021-08-27 RX ADMIN — PROPOFOL 130 MCG/KG/MIN: 10 INJECTION, EMULSION INTRAVENOUS at 10:12

## 2021-08-27 NOTE — INTERVAL H&P NOTE
History adenocarcinoma in rectal polyp, negative on repeat, close surveillance, colonoscopy today  H&P reviewed  After examining the patient I find no changes in the patients condition since the H&P had been written      Vitals:    08/27/21 0855   Pulse: 59   Resp: 18   Temp: 98 2 °F (36 8 °C)   SpO2: 97%

## 2021-08-27 NOTE — ANESTHESIA PREPROCEDURE EVALUATION
Procedure:  COLONOSCOPY    Relevant Problems   ANESTHESIA (within normal limits)      CARDIO   (+) Complete heart block (HCC)   (+) Hyperlipidemia   (+) Hypertension   (+) Mitral valve stenosis   (+) Nonrheumatic aortic valve stenosis   (+) Other hyperlipidemia   (+) PAF (paroxysmal atrial fibrillation) ([facility])   (+) PVCs (premature ventricular contractions)   (+) Peripheral arterial disease (HCC)   (+) Renovascular hypertension   (+) SSS (sick sinus syndrome) ([facility])   (+) Systolic murmur      ENDO   (+) Type 2 diabetes mellitus with chronic kidney disease, with long-term current use of insulin ([facility])   (+) Type 2 diabetes mellitus with diabetic neuropathy, without long-term current use of insulin ([facility])      /RENAL   (+) Acute renal failure (ARF) ([facility])   (+) Stage 4 chronic kidney disease ([facility])      HEMATOLOGY   (+) Anemia   (+) Iron deficiency anemia      MUSCULOSKELETAL   (+) Arthritis   (+) Gout      NEURO/PSYCH   (+) Anxiety   (+) Chronic painful diabetic neuropathy ([facility])      PULMONARY   (+) Chronic obstructive pulmonary disease ([facility])   (+) NICOLASA (obstructive sleep apnea)      Other   (+) Chronic diastolic (congestive) heart failure (HCC)   (+) Diabetes mellitus with neuropathy ([facility])   (+) Osteomyelitis of left foot ([facility])   MDT-DUAL CHAMBER PPM (AAIR-DDDR MODE)/ACTIVE SYSTEM IS MRI CONDITIONAL   CARELINK TRANSMISSION: BATTERY VOLTAGE ADEQUATE (9 4 YRS)  AP-62%, -100% (>40% Breanna@[employer])  ALL AVAILABLE LEAD PARAMETERS WITHIN NORMAL LIMITS  1 NSVT EPISODE FOR 12 BEATS, AVG CL~380MS  EF-60% (ECHO 5/24/21)  PT ON ELIQUIS & METOPROLOL  TASKED DR Frank Nolasco  NORMAL DEVICE FUNCTION  GV   LEFT VENTRICLE: Size was normal  Systolic function was normal  Ejection fraction was estimated to be 60 %  There were no regional wall motion abnormalities  Wall thickness was moderately increased  DOPPLER: The ratio of early ventricular  filling to atrial contraction velocities was within the normal range   The deceleration time of the early transmitral flow velocity was increased  RIGHT VENTRICLE: The size was normal  Systolic function was normal  Wall thickness was normal      LEFT ATRIUM: The atrium was moderately dilated  RIGHT ATRIUM: Size was at the upper limits of normal  A pacing wire was present  MITRAL VALVE: There was moderate to marked annular calcification  There was moderate thickening  There was moderately restricted mobility  DOPPLER: There was moderate stenosis  Mean MV gradient 7 mm HG    MVA not accurate There was mild to  moderate regurgitation  AORTIC VALVE: The valve was trileaflet  Leaflets exhibited moderately to markedly increased thickness  DOPPLER: There was mild-moderate stenosis  Peak velocity across AV of 2 7 M/S  visually looks no more than moderate There was mild to  moderate regurgitation  TRICUSPID VALVE: The valve structure was normal  There was normal leaflet separation  DOPPLER: The transtricuspid velocity was within the normal range  There was no evidence for stenosis  There was mild regurgitation  Pulmonary artery  systolic pressure was moderately increased  Estimated peak PA pressure was 56 mmHg  PULMONIC VALVE: Leaflets exhibited normal thickness, no calcification, and normal cuspal separation  DOPPLER: The transpulmonic velocity was within the normal range  There was no regurgitation  PERICARDIUM: A small pericardial effusion was identified  There was no evidence of hemodynamic compromise  AORTA: The root exhibited upper normal size  SYSTEMIC VEINS: IVC: The inferior vena cava was normal in size  Physical Exam    Airway    Mallampati score: II  TM Distance: >3 FB  Neck ROM: full     Dental   upper dentures,     Cardiovascular  Rhythm: regular, Rate: normal, Murmur,     Pulmonary  Pulmonary exam normal Breath sounds clear to auscultation,     Other Findings        Anesthesia Plan  ASA Score- 4     Anesthesia Type- IV sedation with anesthesia with ASA Monitors  Additional Monitors:   Airway Plan:           Plan Factors-Exercise tolerance (METS): >4 METS  Chart reviewed  EKG reviewed  Existing labs reviewed  Patient summary reviewed  Patient is not a current smoker  Induction-     Postoperative Plan-     Informed Consent- Anesthetic plan and risks discussed with patient  I personally reviewed this patient with the CRNA  Discussed and agreed on the Anesthesia Plan with the ANTWON Boyle

## 2021-08-27 NOTE — ANESTHESIA POSTPROCEDURE EVALUATION
Post-Op Assessment Note    CV Status:  Stable  Pain Score: 0    Pain management: adequate     Mental Status:  Sleepy and arousable   Hydration Status:  Euvolemic   PONV Controlled:  Controlled   Airway Patency:  Patent      Post Op Vitals Reviewed: Yes      Staff: Anesthesiologist, CRNA   Comments: Report given to recovering RN, VSS, pt resting comfortably        No complications documented      /55 (08/27/21 1041)    Temp (!) 97 3 °F (36 3 °C) (08/27/21 1041)    Pulse 63 (08/27/21 1041)   Resp 18 (08/27/21 1041)    SpO2 94 % (08/27/21 1041)

## 2021-09-03 ENCOUNTER — APPOINTMENT (OUTPATIENT)
Dept: LAB | Facility: CLINIC | Age: 66
End: 2021-09-03
Payer: MEDICARE

## 2021-09-03 DIAGNOSIS — I15.0 RENOVASCULAR HYPERTENSION: ICD-10-CM

## 2021-09-03 DIAGNOSIS — N18.4 STAGE 4 CHRONIC KIDNEY DISEASE (HCC): ICD-10-CM

## 2021-09-03 LAB
ANION GAP SERPL CALCULATED.3IONS-SCNC: 5 MMOL/L (ref 4–13)
BUN SERPL-MCNC: 56 MG/DL (ref 5–25)
CALCIUM SERPL-MCNC: 9.6 MG/DL (ref 8.3–10.1)
CHLORIDE SERPL-SCNC: 104 MMOL/L (ref 100–108)
CO2 SERPL-SCNC: 25 MMOL/L (ref 21–32)
CREAT SERPL-MCNC: 3.65 MG/DL (ref 0.6–1.3)
GFR SERPL CREATININE-BSD FRML MDRD: 16 ML/MIN/1.73SQ M
GLUCOSE P FAST SERPL-MCNC: 146 MG/DL (ref 65–99)
POTASSIUM SERPL-SCNC: 4.1 MMOL/L (ref 3.5–5.3)
SODIUM SERPL-SCNC: 134 MMOL/L (ref 136–145)

## 2021-09-03 PROCEDURE — 80048 BASIC METABOLIC PNL TOTAL CA: CPT

## 2021-09-03 PROCEDURE — 36415 COLL VENOUS BLD VENIPUNCTURE: CPT

## 2021-09-08 ENCOUNTER — OFFICE VISIT (OUTPATIENT)
Dept: NEPHROLOGY | Facility: CLINIC | Age: 66
End: 2021-09-08
Payer: MEDICARE

## 2021-09-08 VITALS
RESPIRATION RATE: 16 BRPM | BODY MASS INDEX: 30.06 KG/M2 | HEIGHT: 70 IN | DIASTOLIC BLOOD PRESSURE: 68 MMHG | HEART RATE: 64 BPM | WEIGHT: 210 LBS | SYSTOLIC BLOOD PRESSURE: 140 MMHG

## 2021-09-08 DIAGNOSIS — I50.32 CHRONIC DIASTOLIC (CONGESTIVE) HEART FAILURE (HCC): ICD-10-CM

## 2021-09-08 DIAGNOSIS — N18.4 CKD (CHRONIC KIDNEY DISEASE) STAGE 4, GFR 15-29 ML/MIN (HCC): Primary | ICD-10-CM

## 2021-09-08 PROCEDURE — 99214 OFFICE O/P EST MOD 30 MIN: CPT | Performed by: INTERNAL MEDICINE

## 2021-09-08 RX ORDER — TORSEMIDE 20 MG/1
TABLET ORAL
Qty: 270 TABLET | Refills: 3 | Status: SHIPPED | OUTPATIENT
Start: 2021-09-08 | End: 2021-12-01 | Stop reason: HOSPADM

## 2021-09-08 NOTE — PROGRESS NOTES
OFFICE follow-up- Nephrology   Eladia Maikol 74 y o  male MRN: [id number]    Encounter: [id number]          ASSESSMENT and PLAN:  Kya Felton was seen today for an initial consultation    He is 74years old with a past medical history of type 2 diabetes mellitus times 20 years, hypertension, gout  hyperlipidemia proteinuria and diabetic foot ulcer presents for follow-up    Diagnoses and all orders for this visit:    Essential (primary) hypertension  - blood pressures at home have been in the 211R systolic/60-70  - he is currently on amlodipine 10 mg daily, metoprolol 50 mg twice daily, losartan was discontinued for ELZBIETA, and Hyperkalemia, now on torsemide 40 mg in the morning and 20 mg at night  -secondary workup included a repeat renal artery ultrasound which does show a left renal artery stenosis  - his echocardiogram was reviewed as well he should have further follow-up with cardiology to monitor his valvular heart disease- he does have LVH so blood pressure control is very important and this was discussed in detail with him in past  -overall will monitor and target a blood pressure of 140/60  Which bp has been higher than this  -CKD with nephrotic syndrome  -on torsemide 40 mg in the morning and 20 mg in the afternoon  -some increased lower extremity edema will have him take metolazone x1 today and once on Friday  -his estimated GFR has remained around 16-20 mL/minute     Persistent proteinuria/stage 4 chronic kidney disease  - in the setting of prolonged diabetes  -serologic workup in past has been negative  -creatinine has remained around 3 6-3 9 with with estimated GFR of 16  -nephritic range proteinuria secondary to diabetic kidney disease likely not biopsy-proven  -episodes of acute interstitial nephritis with antibiotics  -if dialysis is needed he would want peritoneal dialysis   -transplant evaluation underway    Nephropathy-diabetic kidney disease  -serologies negative will monitor at this point    Gout  - on allopurinol 300 mg daily  - has been gout free, will monitor and continue    Bilateral leg edema  -As above regarding medications    Left renal artery stenosis and history of diastolic heart failure  -renal artery Doppler showed 60% stenosis  -can be indication and cause of flash pulmonary edema  -mitral valve disease also present  -diuresis as above    Left foot osteomyelitis, peripheral vascular disease  -status post amputation    Iron deficiency Anemia  -CKD IV   -colonoscopy with sigmoid polyp, adenocarcinoma, high grade dysplaisa to margins, CEA normal-repeating colonscopy soon  -reviewed Dr Qiana Walters Notes  -monitor iron saturation and will give iron is needed  -will hold off on ALFA given adenocarcinoma of the colon    Will continue monthly lab with follow-up in 3 months      HPI:  Sandra Liz is a 74 y o male who was referred by Dr Devine ref  provider found for evaluation of  Initial evaluation    He is 74years old and has a history of type 2 diabetes mellitus currently on oral medications stage IIIA to 2 chronic kidney disease mitral valve prolapse,    Overall he is feeling relatively well, his weight is, he denies any acute chest pain or shortness of breath fevers or chills nausea vomiting diarrhea or constipation, he notices that is extremity edema is worsening home his blood pressures have been 140-150/60-70 at, he had a recent colonoscopy with a sigmoid polyp and adenocarcinoma with high-grade dysplasia CEA was normal, getting repeat colonoscopy soon, he denies any acute chest pain or shortness of breath fevers chills nausea vomiting feels well, all some increased edema will otherwise has no acute complaints      ROS: All the systems were reviewed and were negative except as documented on the HPI      Allergies: Invokana [canagliflozin], Lamisil [terbinafine], Other, and Latex    Medications:   Current Outpatient Medications:     allopurinol (ZYLOPRIM) 300 mg tablet, TAKE 1 TABLET BY MOUTH EVERY DAY IN THE MORNING, Disp: 90 tablet, Rfl: 1    amLODIPine (NORVASC) 10 mg tablet, Take 1 tablet (10 mg total) by mouth daily, Disp: 90 tablet, Rfl: 3    apixaban (ELIQUIS) 5 mg, Take 1 tablet (5 mg total) by mouth every 12 (twelve) hours, Disp: 60 tablet, Rfl: 11    betamethasone valerate (VALISONE) 0 1 % cream, as needed , Disp: , Rfl: 3    calcium acetate (PHOSLO) capsule, TAKE 1 CAPSULE (667 MG TOTAL) BY MOUTH 3 (THREE) TIMES A DAY WITH MEALS, Disp: 180 capsule, Rfl: 1    Cholecalciferol (VITAMIN D-3) 1000 units CAPS, Take 1 capsule by mouth every morning  , Disp: , Rfl:     Dupilumab (Dupixent) 300 MG/2ML SOPN, Inject 300 mg under the skin Once every 2 weeks, Disp: , Rfl:     famotidine (PEPCID) 40 MG tablet, TAKE 1 TABLET BY MOUTH EVERY DAY, Disp: 90 tablet, Rfl: 3    Insulin Degludec-Liraglutide (Insulin Degludec-Liraglutide) 100 units-3 6 mg/mL injection pen, Inject 19 Units under the skin daily, Disp: 5 pen, Rfl: 3    Insulin Pen Needle (BD Pen Needle Zenaida U/F) 32G X 4 MM MISC, Use 1 daily, Disp: 200 each, Rfl: 0    metolazone (ZAROXOLYN) 5 mg tablet, Take 1 tablet (5 mg total) by mouth see administration instructions Take every 6 days prn as directed by physician for wt gain/edema, Disp: 5 tablet, Rfl: 5    metoprolol tartrate (LOPRESSOR) 50 mg tablet, Take 1 tablet (50 mg total) by mouth every 12 (twelve) hours, Disp: 180 tablet, Rfl: 3    Multiple Vitamin (MULTIVITAMIN) tablet, Take 1 tablet by mouth daily IN AM, Disp: , Rfl:     omega-3-acid ethyl esters (LOVAZA) 1 g capsule, Take 2 capsules (2 g total) by mouth 2 (two) times a day Mail print prescription to pt, Disp: 360 capsule, Rfl: 3    OneTouch Ultra test strip, USE TO TEST BLOOD SUGAR 3 TIMES A DAY, Disp: 100 each, Rfl: 3    simvastatin (ZOCOR) 20 mg tablet, Take 1 tablet (20 mg total) by mouth daily at bedtime, Disp: 90 tablet, Rfl: 3    sodium bicarbonate 650 mg tablet, TAKE 1 TABLET (650 MG TOTAL) BY MOUTH 2 (TWO) TIMES DAILY AFTER MEALS, Disp: 180 tablet, Rfl: 1    tamsulosin (FLOMAX) 0 4 mg, TAKE 1 CAPSULE BY MOUTH EVERY DAY WITH DINNER, Disp: 90 capsule, Rfl: 3    torsemide (DEMADEX) 20 mg tablet, 40 mg AM 20 mg early PM, Disp: 270 tablet, Rfl: 3    Past Medical History:   Diagnosis Date    Anemia     iron def    Arthritis     OA    Bruise of both arms     forearms and both hands    Bruises easily     Cancer [facility]) 09/01/2021    colon    CHF (congestive heart failure) ([facility])     Chronic kidney disease     CPAP (continuous positive airway pressure) dependence     Diabetes [facility] [address] [facility]     Diabetic foot ulcer [facility] [address] [facility]     Eczema     Erectile dysfunction     Gout     Heart murmur     Hyperlipidemia     Hypertension     Hypoglycemia 3/8/2019    Murmur     Nephropathy     Osteoarthritis     PAD [facility]peripheral artery disease) ([facility])     Seasonal allergies     Sleep apnea     Toe infection     bilat great toes    Vertigo     Walks frequently     Wears dentures     upper    Wears glasses      Past Surgical History:   Procedure Laterality Date    CARDIAC PACEMAKER PLACEMENT      CARPAL TUNNEL RELEASE Left     CARPAL TUNNEL RELEASE Right     CARPAL TUNNEL RELEASE      COLONOSCOPY  08/2020    FOOT AMPUTATION Left 2/10/2020    Procedure: PARTIAL 1ST RAY AMPUTATION;  Surgeon: Merrily Harada, DPM;  [facility];  Service: Podiatry    INCISION AND DRAINAGE OF WOUND Left 1/12/2020    Procedure: INCISION AND DRAINAGE (I&D) EXTREMITY AND REMOVAL OF SESMOID BONE;  Surgeon: Ken Hammond DPM;  [facility];  Service: Podiatry    IR PICC REPOSITION  1/14/2020    KNEE ARTHROSCOPY Left     KNEE ARTHROSCOPY Right     KNEE SURGERY      AL AMPUTATION TOE,I-P JT Bilateral 5/2/2017    Procedure: PARTIAL AMPUTATION RIGHT AND LEFT HALLUX ;  Surgeon: Merrily Harada, DPM;  [facility];  Service: Podiatry    AL AMPUTATION TOE,MT-P JT Left 7/25/2017    Procedure: 2ND TOE AMPUTATION;  Surgeon: Merrily Harada, DPM;  [facility];  Service: Podiatry    SHOULDER ARTHROSCOPY Left     with screws,RTC    SHOULDER SURGERY      TOE AMPUTATION Left 1/8/2020    Procedure: HALLUX AMPUTATION;  Surgeon: Rowan Lau DPM;  [facility];  Service: Podiatry    UPPER GASTROINTESTINAL ENDOSCOPY  03/2020    Intestinal metaplasia prepyloric    VASECTOMY       Family History   Problem Relation Age of Onset    Heart disease Father         passed away    Hypertension Father     Pulmonary embolism Father     Hypertension Mother         assed away      reports that he quit smoking about 10 years ago  His smoking use included cigarettes  He started smoking about 43 years ago  He has a 10 00 pack-year smoking history  He has never used smokeless tobacco  He reports that he does not drink alcohol and does not use drugs  Physical Exam:   Vitals:    09/08/21 0820   BP: 140/68   BP Location: Left arm   Patient Position: Sitting   Cuff Size: Large   Pulse: 64   Resp: 16   Weight: 95 3 kg (210 lb)   Height: 5' 10" (1 778 m)     Body mass index is 30 13 kg/m²      General: conscious, cooperative, in no acute distress  Eyes: conjunctivae pink, anicteric sclerae  ENT: lips and mucous membranes moist  Neck: supple, no JVD  Chest: clear breath sounds bilateral, no crackles, ronchus or wheezings  CVS: normal rate, regular rhythm  Abdomen: soft, non-tender, non-distended, normoactive bowel sounds  Extremities:  3+ edema  Skin: no rash  Neuro: awake, alert, oriented  Psych:  Pleasant affect      Lab Results:     Results for orders placed or performed in visit on 73/48/02   Basic metabolic panel   Result Value Ref Range    Sodium 134 (L) 136 - 145 mmol/L    Potassium 4 1 3 5 - 5 3 mmol/L    Chloride 104 100 - 108 mmol/L    CO2 25 21 - 32 mmol/L    ANION GAP 5 4 - 13 mmol/L    BUN 56 (H) 5 - 25 mg/dL    Creatinine 3 65 (H) 0 60 - 1 30 mg/dL    Glucose, Fasting 146 (H) 65 - 99 mg/dL    Calcium 9 6 8 3 - 10 1 mg/dL    eGFR 16 ml/min/1 73sq m Echocardiogram 8/9/15  - left ventricle had an EF of 60% wall thickness was mildly increased with mild concentric hypertrophy and an abnormal left ventricular relaxation  - left atrium was mildly to moderately dilated  - right atrium was mildly dilated  - mitral valve shows mild annular calcification    Renal artery ultrasound 6/27/19  RIGHT RENAL:  No evidence of significant arterial occlusive disease in the main renal artery  Pulsatile renal vein  Renovascular resistive index of 0 8, indicative of parenchymal disease  Renal/Aorta Ratio: 1 81  The Kidney measures 13 cm  LEFT RENAL:  There is evidence of a >60% stenosis in the ostium and mid renal artery  Pulsatile renal vein  Renovascular resistive index of 0 9, indicative of parenchymal disease  Renal/Aorta Ratio: 2 4  The Kidney measures 13 9 cm  MESENTERIC:  There is evidence of a >70% stenosis in the celiac artery  Superior mesenteric artery is patent  Portions of the record may have been created with voice recognition software  Occasional wrong word or "sound a like" substitutions may have occurred due to the inherent limitations of voice recognition software  Read the chart carefully and recognize, using context, where substitutions have occurred  If you have any questions, please contact the dictating provider

## 2021-09-08 NOTE — PATIENT INSTRUCTIONS
Chronic Kidney Disease   WHAT YOU NEED TO KNOW:   Chronic kidney disease (CKD) is the gradual and permanent loss of kidney function  It is also called chronic kidney failure, or chronic renal insufficiency  Normally, the kidneys remove fluid, chemicals, and waste from your blood  These wastes are turned into urine by your kidneys  CKD may worsen over time and lead to kidney failure  Your CKD team will help you and your family plan for your care at home  The team will help you create goals and find ways to meet your goals  Your care plan may change over time as your needs change  DISCHARGE INSTRUCTIONS:   Call your local emergency number (911 in the [address]) if:   · You have a seizure  · You have shortness of breath  Return to the emergency department if:   · You are confused and very drowsy  Call your doctor or nephrologist if:   · You suddenly gain or lose more weight than your healthcare provider has told you is okay  · You have itchy skin or a rash  · You urinate more or less than you normally do  · You have blood in your urine  · You have nausea and are vomiting  · You have fatigue or muscle weakness  · You have hiccups that will not stop  · You have questions or concerns about your condition or care  Medicines:   · Medicines  may be given to decrease blood pressure and get rid of extra fluid  You may also receive medicine to manage health conditions that may occur with CKD, such as anemia, diabetes, and heart disease  · Take your medicine as directed  Contact your healthcare provider if you think your medicine is not helping or if you have side effects  Tell him or her if you are allergic to any medicine  Keep a list of the medicines, vitamins, and herbs you take  Include the amounts, and when and why you take them  Bring the list or the pill bottles to follow-up visits  Carry your medicine list with you in case of an emergency      What you can do to manage CKD: Management may include making some lifestyle changes  Tell your healthcare provider if you have any concerns about being able to make changes  He or she can help you find solutions, including working with specialists  Ask for help creating a plan to break large goals into smaller steps  Your plan may include any of the following:  · Manage other health conditions  Your healthcare provider will work with you to make a care plan that meets your needs  You will be checked regularly for heart disease or other conditions that can make CKD worse, such as diabetes  Your blood pressure will be closely monitored  You will also get a target blood pressure and help making a plan to reach your target  This may include taking your blood pressure at home  · Maintain a healthy weight  Your weight and body mass index (BMI) will be checked regularly  BMI helps find if your weight is healthy for your height  Your healthcare provider will use other tests to check your muscle and protein levels  Extra weight can strain your kidneys  A low weight or low muscle mass can make you feel more tired  You may have trouble doing your daily activities  Ask your provider what a healthy weight is for you  He or she can help you create a plan to lose or gain weight safely, if needed  The plan may include keeping a food diary  This is a list of foods and liquids you have each day  Your provider will use the diary to help you make changes, if needed  Changes are based on your health and any other conditions you have, such as diabetes  · Create an exercise plan  Regular exercise can help you manage CKD, high blood pressure, and diabetes  Exercise also helps control weight  Your provider can help you create exercise goals and a plan to reach those goals  For example, your goal may be to exercise for 30 minutes in a day  Your plan can include breaking exercise into 10 minute sessions, 3 times during the day           · Create a healthy eating plan  Your provider may tell you to eat food low in potassium, phosphorus, or protein  Your provider may also recommend vitamin or mineral supplements  Do not take any supplements without talking to your provider  A dietitian can help you plan meals if needed  Ask how much liquid to drink each day and which liquids are best for you  · Limit sodium (salt) as directed  You may need to limit sodium to less than 2,300 milligrams (mg) each day  Ask your dietitian or healthcare provider how much sodium you can have each day  The amount depends on your stage of kidney disease  Table salt, canned foods, soups, salted snacks, and processed meats, like deli meats and sausage, are high in sodium  Your provider or a dietitian can show you how to read food labels for sodium  · Limit alcohol as directed  Alcohol can cause fluid retention and can affect your kidneys  Ask how much alcohol is safe for you  A drink of alcohol is 12 ounces of beer, 5 ounces of wine, or 1½ ounces of liquor  · Do not smoke  Nicotine and other chemicals in cigarettes and cigars can cause kidney damage  Ask your provider for information if you currently smoke and need help to quit  E-cigarettes or smokeless tobacco still contain nicotine  Talk to your provider before you use these products  · Ask about over-the-counter medicines  Medicines such as NSAIDs and laxatives may harm your kidneys  Some cough and cold medicines can raise your blood pressure  Always ask if a medicine is safe before you take it  · Ask about vaccines you may need  CKD can increase your risk for infections such as pneumonia, influenza, and hepatitis  Vaccines lower your risk for infection  Your healthcare provider will tell you which vaccines you need and when to get them  Follow up with your doctor or nephrologist as directed: You will need to return for tests to monitor your kidney and nerve function, and your parathyroid hormone level   Your medicines may be changed, based on certain test results  Write down your questions so you remember to ask them during your visits  © Copyright [employer] 2021 Information is for End User's use only and may not be sold, redistributed or otherwise used for commercial purposes  All illustrations and images included in CareNotes® are the copyrighted property of [employer]  or [employer]  The above information is an  only  It is not intended as medical advice for individual conditions or treatments  Talk to your doctor, nurse or pharmacist before following any medical regimen to see if it is safe and effective for you

## 2021-09-09 ENCOUNTER — REMOTE DEVICE CLINIC VISIT (OUTPATIENT)
Dept: CARDIOLOGY CLINIC | Facility: CLINIC | Age: 66
End: 2021-09-09
Payer: MEDICARE

## 2021-09-09 DIAGNOSIS — I50.32 CHRONIC DIASTOLIC (CONGESTIVE) HEART FAILURE (HCC): ICD-10-CM

## 2021-09-09 DIAGNOSIS — Z95.0 CARDIAC PACEMAKER IN SITU: Primary | ICD-10-CM

## 2021-09-09 PROCEDURE — 93296 REM INTERROG EVL PM/IDS: CPT | Performed by: INTERNAL MEDICINE

## 2021-09-09 PROCEDURE — 93294 REM INTERROG EVL PM/LDLS PM: CPT | Performed by: INTERNAL MEDICINE

## 2021-09-09 RX ORDER — METOLAZONE 5 MG/1
TABLET ORAL
Qty: 15 TABLET | Refills: 3 | Status: SHIPPED | OUTPATIENT
Start: 2021-09-09 | End: 2021-12-01 | Stop reason: HOSPADM

## 2021-09-09 NOTE — PROGRESS NOTES
Results for orders placed or performed in visit on 09/09/21   Cardiac EP device report    Narrative    MDT-DUAL CHAMBER PPM (AAIR-DDDR MODE)/ACTIVE SYSTEM IS MRI CONDITIONAL  CARELINK TRANSMISSION: BATTERY VOLTAGE ADEQUATE (9 2  YRS)  AP 55 3%  100% (>40% [place] - DDDR 60 )  ALL AVAILABLE LEAD PARAMETERS WITHIN NORMAL LIMITS  NO SIGNIFICANT HIGH RATE EPISODES  EF60% (5/24/21 ECHO)  PT ON ELIQUIS, METOPROLOL TART  PACEMAKER FUNCTIONING APPROPRIATELY       EBS

## 2021-09-22 ENCOUNTER — TELEPHONE (OUTPATIENT)
Dept: FAMILY MEDICINE CLINIC | Facility: CLINIC | Age: 66
End: 2021-09-22

## 2021-09-22 NOTE — TELEPHONE ENCOUNTER
We received a pre-op clearance form for pt, as he is having surgery on 10/21/21  Called pt to schedule a consult appt for the clearance  He stated he will call back tomorrow and schedule, as he is not home right now  Form put on Dr Woods Erps desk, photocopy put in brown folder

## 2021-09-24 ENCOUNTER — APPOINTMENT (OUTPATIENT)
Dept: LAB | Facility: CLINIC | Age: 66
End: 2021-09-24
Payer: MEDICARE

## 2021-09-24 ENCOUNTER — TELEPHONE (OUTPATIENT)
Dept: NEPHROLOGY | Facility: HOSPITAL | Age: 66
End: 2021-09-24

## 2021-09-24 DIAGNOSIS — I50.32 CHRONIC DIASTOLIC (CONGESTIVE) HEART FAILURE (HCC): ICD-10-CM

## 2021-09-24 DIAGNOSIS — I15.0 RENOVASCULAR HYPERTENSION: Primary | ICD-10-CM

## 2021-09-24 DIAGNOSIS — C80.1 ADENOCARCINOMA IN A POLYP (HCC): ICD-10-CM

## 2021-09-24 DIAGNOSIS — N18.4 CKD (CHRONIC KIDNEY DISEASE) STAGE 4, GFR 15-29 ML/MIN (HCC): ICD-10-CM

## 2021-09-24 DIAGNOSIS — N18.4 STAGE 4 CHRONIC KIDNEY DISEASE (HCC): ICD-10-CM

## 2021-09-24 LAB
ALBUMIN SERPL BCP-MCNC: 3.4 G/DL (ref 3.5–5)
ALP SERPL-CCNC: 93 U/L (ref 46–116)
ALT SERPL W P-5'-P-CCNC: 22 U/L (ref 12–78)
ANION GAP SERPL CALCULATED.3IONS-SCNC: 7 MMOL/L (ref 4–13)
AST SERPL W P-5'-P-CCNC: 18 U/L (ref 5–45)
BACTERIA UR QL AUTO: NORMAL /HPF
BASOPHILS # BLD AUTO: 0.07 THOUSANDS/ΜL (ref 0–0.1)
BASOPHILS NFR BLD AUTO: 1 % (ref 0–1)
BILIRUB SERPL-MCNC: 0.71 MG/DL (ref 0.2–1)
BILIRUB UR QL STRIP: NEGATIVE
BUN SERPL-MCNC: 87 MG/DL (ref 5–25)
CALCIUM ALBUM COR SERPL-MCNC: 9.7 MG/DL (ref 8.3–10.1)
CALCIUM SERPL-MCNC: 9.2 MG/DL (ref 8.3–10.1)
CHLORIDE SERPL-SCNC: 99 MMOL/L (ref 100–108)
CLARITY UR: CLEAR
CO2 SERPL-SCNC: 27 MMOL/L (ref 21–32)
COLOR UR: YELLOW
CREAT SERPL-MCNC: 4.83 MG/DL (ref 0.6–1.3)
CREAT UR-MCNC: 75.7 MG/DL
EOSINOPHIL # BLD AUTO: 0.46 THOUSAND/ΜL (ref 0–0.61)
EOSINOPHIL NFR BLD AUTO: 4 % (ref 0–6)
ERYTHROCYTE [DISTWIDTH] IN BLOOD BY AUTOMATED COUNT: 14 % (ref 11.6–15.1)
GFR SERPL CREATININE-BSD FRML MDRD: 11 ML/MIN/1.73SQ M
GLUCOSE P FAST SERPL-MCNC: 179 MG/DL (ref 65–99)
GLUCOSE UR STRIP-MCNC: ABNORMAL MG/DL
HCT VFR BLD AUTO: 31.5 % (ref 36.5–49.3)
HGB BLD-MCNC: 10.8 G/DL (ref 12–17)
HGB UR QL STRIP.AUTO: ABNORMAL
HYALINE CASTS #/AREA URNS LPF: NORMAL /LPF
IMM GRANULOCYTES # BLD AUTO: 0.09 THOUSAND/UL (ref 0–0.2)
IMM GRANULOCYTES NFR BLD AUTO: 1 % (ref 0–2)
KETONES UR STRIP-MCNC: NEGATIVE MG/DL
LEUKOCYTE ESTERASE UR QL STRIP: NEGATIVE
LYMPHOCYTES # BLD AUTO: 1.11 THOUSANDS/ΜL (ref 0.6–4.47)
LYMPHOCYTES NFR BLD AUTO: 9 % (ref 14–44)
MAGNESIUM SERPL-MCNC: 2.7 MG/DL (ref 1.6–2.6)
MCH RBC QN AUTO: 31.3 PG (ref 26.8–34.3)
MCHC RBC AUTO-ENTMCNC: 34.3 G/DL (ref 31.4–37.4)
MCV RBC AUTO: 91 FL (ref 82–98)
MONOCYTES # BLD AUTO: 1 THOUSAND/ΜL (ref 0.17–1.22)
MONOCYTES NFR BLD AUTO: 8 % (ref 4–12)
NEUTROPHILS # BLD AUTO: 9.83 THOUSANDS/ΜL (ref 1.85–7.62)
NEUTS SEG NFR BLD AUTO: 77 % (ref 43–75)
NITRITE UR QL STRIP: NEGATIVE
NON-SQ EPI CELLS URNS QL MICRO: NORMAL /HPF
NRBC BLD AUTO-RTO: 0 /100 WBCS
PH UR STRIP.AUTO: 7 [PH]
PHOSPHATE SERPL-MCNC: 4.6 MG/DL (ref 2.3–4.1)
PLATELET # BLD AUTO: 294 THOUSANDS/UL (ref 149–390)
PMV BLD AUTO: 10.1 FL (ref 8.9–12.7)
POTASSIUM SERPL-SCNC: 3.5 MMOL/L (ref 3.5–5.3)
PROT SERPL-MCNC: 7.8 G/DL (ref 6.4–8.2)
PROT UR STRIP-MCNC: ABNORMAL MG/DL
PROT UR-MCNC: 342 MG/DL
PROT/CREAT UR: 4.52 MG/G{CREAT} (ref 0–0.1)
PTH-INTACT SERPL-MCNC: 145.2 PG/ML (ref 18.4–80.1)
RBC # BLD AUTO: 3.45 MILLION/UL (ref 3.88–5.62)
RBC #/AREA URNS AUTO: NORMAL /HPF
SODIUM SERPL-SCNC: 133 MMOL/L (ref 136–145)
SP GR UR STRIP.AUTO: 1.01 (ref 1–1.03)
URATE SERPL-MCNC: 4.8 MG/DL (ref 4.2–8)
UROBILINOGEN UR QL STRIP.AUTO: 0.2 E.U./DL
WBC # BLD AUTO: 12.56 THOUSAND/UL (ref 4.31–10.16)
WBC #/AREA URNS AUTO: NORMAL /HPF

## 2021-09-24 PROCEDURE — 84550 ASSAY OF BLOOD/URIC ACID: CPT

## 2021-09-24 PROCEDURE — 80053 COMPREHEN METABOLIC PANEL: CPT

## 2021-09-24 PROCEDURE — 85025 COMPLETE CBC W/AUTO DIFF WBC: CPT

## 2021-09-24 PROCEDURE — 36415 COLL VENOUS BLD VENIPUNCTURE: CPT

## 2021-09-24 PROCEDURE — 82570 ASSAY OF URINE CREATININE: CPT

## 2021-09-24 PROCEDURE — 83970 ASSAY OF PARATHORMONE: CPT

## 2021-09-24 PROCEDURE — 81001 URINALYSIS AUTO W/SCOPE: CPT

## 2021-09-24 PROCEDURE — 84100 ASSAY OF PHOSPHORUS: CPT

## 2021-09-24 PROCEDURE — 83735 ASSAY OF MAGNESIUM: CPT

## 2021-09-24 PROCEDURE — 84156 ASSAY OF PROTEIN URINE: CPT

## 2021-09-24 NOTE — TELEPHONE ENCOUNTER
----- Message from Emily Carrillo DO sent at 9/24/2021 12:58 PM EDT -----  Creatinine increased from 3 6 to 4 8 was given a dose of metalazone yesterday  Feels well no complaints good urine output bp has been stable:      Plan:    1  Hold torsemide for today and tomorrow restart on Sunday 9/26  2  Hold metalozone   3  Repeat BMP on Monday-please send him a script and place in chart  4  If urine output poor or feels unwell I asked him to come to ED  Thanks

## 2021-09-29 ENCOUNTER — APPOINTMENT (OUTPATIENT)
Dept: LAB | Facility: CLINIC | Age: 66
End: 2021-09-29
Payer: MEDICARE

## 2021-09-29 DIAGNOSIS — I15.0 RENOVASCULAR HYPERTENSION: Primary | ICD-10-CM

## 2021-09-29 DIAGNOSIS — N18.4 STAGE 4 CHRONIC KIDNEY DISEASE (HCC): ICD-10-CM

## 2021-09-29 DIAGNOSIS — E87.6 HYPOKALEMIA: ICD-10-CM

## 2021-09-29 DIAGNOSIS — I15.0 RENOVASCULAR HYPERTENSION: ICD-10-CM

## 2021-09-29 LAB
ANION GAP SERPL CALCULATED.3IONS-SCNC: 6 MMOL/L (ref 4–13)
BUN SERPL-MCNC: 64 MG/DL (ref 5–25)
CALCIUM SERPL-MCNC: 9.5 MG/DL (ref 8.3–10.1)
CHLORIDE SERPL-SCNC: 103 MMOL/L (ref 100–108)
CO2 SERPL-SCNC: 25 MMOL/L (ref 21–32)
CREAT SERPL-MCNC: 4.07 MG/DL (ref 0.6–1.3)
GFR SERPL CREATININE-BSD FRML MDRD: 14 ML/MIN/1.73SQ M
GLUCOSE P FAST SERPL-MCNC: 154 MG/DL (ref 65–99)
POTASSIUM SERPL-SCNC: 3.3 MMOL/L (ref 3.5–5.3)
SODIUM SERPL-SCNC: 134 MMOL/L (ref 136–145)

## 2021-09-29 PROCEDURE — 36415 COLL VENOUS BLD VENIPUNCTURE: CPT

## 2021-09-29 PROCEDURE — 80048 BASIC METABOLIC PNL TOTAL CA: CPT

## 2021-09-29 RX ORDER — POTASSIUM CHLORIDE 750 MG/1
20 CAPSULE, EXTENDED RELEASE ORAL DAILY
Qty: 30 CAPSULE | Refills: 3 | Status: SHIPPED | OUTPATIENT
Start: 2021-09-29 | End: 2021-10-08

## 2021-09-29 NOTE — TELEPHONE ENCOUNTER
----- Message from Keyla Hwang DO sent at 9/29/2021  1:35 PM EDT -----  Creatinine is trending down from 4 83 to 4 07 continue to hold the metolazone continue the torsemide if his weight is stable just have him take 40 mg daily have him some also start taking 20 mEq of potassium chloride daily and have him a script for a   repeat a BMP in 1 week again

## 2021-09-29 NOTE — TELEPHONE ENCOUNTER
I spoke to the patient and he is aware his creatinine is trending down, and to continue to hold the metolazone  He states his weights are fluctuating up a little bit, he will stay on his Torsemide dose and begin taking Potassium Chloride 20 meq daily  We have sent his prescription to his pharmacy  He will repeat a BMP in 1 week

## 2021-09-30 DIAGNOSIS — I10 ESSENTIAL (PRIMARY) HYPERTENSION: ICD-10-CM

## 2021-09-30 RX ORDER — AMLODIPINE BESYLATE 10 MG/1
10 TABLET ORAL DAILY
Qty: 90 TABLET | Refills: 3 | Status: SHIPPED | OUTPATIENT
Start: 2021-09-30 | End: 2022-08-09 | Stop reason: ALTCHOICE

## 2021-10-04 ENCOUNTER — CLINICAL SUPPORT (OUTPATIENT)
Dept: FAMILY MEDICINE CLINIC | Facility: CLINIC | Age: 66
End: 2021-10-04
Payer: MEDICARE

## 2021-10-04 DIAGNOSIS — Z23 NEED FOR INFLUENZA VACCINATION: Primary | ICD-10-CM

## 2021-10-04 PROCEDURE — 90686 IIV4 VACC NO PRSV 0.5 ML IM: CPT

## 2021-10-04 PROCEDURE — 96372 THER/PROPH/DIAG INJ SC/IM: CPT

## 2021-10-04 PROCEDURE — G0008 ADMIN INFLUENZA VIRUS VAC: HCPCS

## 2021-10-05 ENCOUNTER — CONSULT (OUTPATIENT)
Dept: FAMILY MEDICINE CLINIC | Facility: CLINIC | Age: 66
End: 2021-10-05
Payer: MEDICARE

## 2021-10-05 VITALS
RESPIRATION RATE: 16 BRPM | SYSTOLIC BLOOD PRESSURE: 130 MMHG | TEMPERATURE: 97.6 F | HEART RATE: 68 BPM | HEIGHT: 70 IN | BODY MASS INDEX: 30.06 KG/M2 | WEIGHT: 210 LBS | OXYGEN SATURATION: 96 % | DIASTOLIC BLOOD PRESSURE: 60 MMHG

## 2021-10-05 DIAGNOSIS — N18.4 STAGE 4 CHRONIC KIDNEY DISEASE (HCC): ICD-10-CM

## 2021-10-05 DIAGNOSIS — I49.5 SSS (SICK SINUS SYNDROME) (HCC): ICD-10-CM

## 2021-10-05 DIAGNOSIS — I73.9 PERIPHERAL ARTERIAL DISEASE (HCC): ICD-10-CM

## 2021-10-05 DIAGNOSIS — Z79.4 TYPE 2 DIABETES MELLITUS WITH STAGE 4 CHRONIC KIDNEY DISEASE, WITH LONG-TERM CURRENT USE OF INSULIN (HCC): ICD-10-CM

## 2021-10-05 DIAGNOSIS — N18.4 TYPE 2 DIABETES MELLITUS WITH STAGE 4 CHRONIC KIDNEY DISEASE, WITH LONG-TERM CURRENT USE OF INSULIN (HCC): ICD-10-CM

## 2021-10-05 DIAGNOSIS — G47.33 OSA (OBSTRUCTIVE SLEEP APNEA): ICD-10-CM

## 2021-10-05 DIAGNOSIS — I10 PRIMARY HYPERTENSION: ICD-10-CM

## 2021-10-05 DIAGNOSIS — E11.22 TYPE 2 DIABETES MELLITUS WITH STAGE 4 CHRONIC KIDNEY DISEASE, WITH LONG-TERM CURRENT USE OF INSULIN (HCC): ICD-10-CM

## 2021-10-05 DIAGNOSIS — Z01.818 PREOPERATIVE CLEARANCE: Primary | ICD-10-CM

## 2021-10-05 DIAGNOSIS — I50.32 CHRONIC DIASTOLIC (CONGESTIVE) HEART FAILURE (HCC): ICD-10-CM

## 2021-10-05 PROCEDURE — 93000 ELECTROCARDIOGRAM COMPLETE: CPT | Performed by: FAMILY MEDICINE

## 2021-10-05 PROCEDURE — 99214 OFFICE O/P EST MOD 30 MIN: CPT | Performed by: FAMILY MEDICINE

## 2021-10-06 ENCOUNTER — ANESTHESIA EVENT (OUTPATIENT)
Dept: PERIOP | Facility: HOSPITAL | Age: 66
DRG: 330 | End: 2021-10-06
Payer: MEDICARE

## 2021-10-06 ENCOUNTER — LAB REQUISITION (OUTPATIENT)
Dept: LAB | Facility: HOSPITAL | Age: 66
End: 2021-10-06
Payer: MEDICARE

## 2021-10-06 ENCOUNTER — APPOINTMENT (OUTPATIENT)
Dept: LAB | Facility: CLINIC | Age: 66
End: 2021-10-06
Payer: MEDICARE

## 2021-10-06 DIAGNOSIS — I15.0 RENOVASCULAR HYPERTENSION: ICD-10-CM

## 2021-10-06 DIAGNOSIS — N18.4 STAGE 4 CHRONIC KIDNEY DISEASE (HCC): ICD-10-CM

## 2021-10-06 DIAGNOSIS — I50.32 CHRONIC DIASTOLIC (CONGESTIVE) HEART FAILURE (HCC): ICD-10-CM

## 2021-10-06 DIAGNOSIS — N18.4 CKD (CHRONIC KIDNEY DISEASE) STAGE 4, GFR 15-29 ML/MIN (HCC): ICD-10-CM

## 2021-10-06 DIAGNOSIS — Z91.89 AT HIGH RISK FOR KIDNEY INJURY: Primary | ICD-10-CM

## 2021-10-06 DIAGNOSIS — N18.4 CHRONIC KIDNEY DISEASE, STAGE 4 (SEVERE) (HCC): ICD-10-CM

## 2021-10-06 LAB
ABO GROUP BLD: NORMAL
ALBUMIN SERPL BCP-MCNC: 3.2 G/DL (ref 3.5–5)
ALP SERPL-CCNC: 113 U/L (ref 46–116)
ALT SERPL W P-5'-P-CCNC: 30 U/L (ref 12–78)
ANION GAP SERPL CALCULATED.3IONS-SCNC: 7 MMOL/L (ref 4–13)
AST SERPL W P-5'-P-CCNC: 26 U/L (ref 5–45)
BACTERIA UR QL AUTO: NORMAL /HPF
BASOPHILS # BLD AUTO: 0.06 THOUSANDS/ΜL (ref 0–0.1)
BASOPHILS NFR BLD AUTO: 1 % (ref 0–1)
BILIRUB SERPL-MCNC: 0.78 MG/DL (ref 0.2–1)
BILIRUB UR QL STRIP: NEGATIVE
BLD GP AB SCN SERPL QL: NEGATIVE
BUN SERPL-MCNC: 53 MG/DL (ref 5–25)
CALCIUM ALBUM COR SERPL-MCNC: 10.4 MG/DL (ref 8.3–10.1)
CALCIUM SERPL-MCNC: 9.8 MG/DL (ref 8.3–10.1)
CEA SERPL-MCNC: 1.3 NG/ML (ref 0–3)
CHLORIDE SERPL-SCNC: 103 MMOL/L (ref 100–108)
CLARITY UR: CLEAR
CO2 SERPL-SCNC: 24 MMOL/L (ref 21–32)
COLOR UR: YELLOW
CREAT SERPL-MCNC: 3.65 MG/DL (ref 0.6–1.3)
CREAT UR-MCNC: 61 MG/DL
EOSINOPHIL # BLD AUTO: 0.49 THOUSAND/ΜL (ref 0–0.61)
EOSINOPHIL NFR BLD AUTO: 6 % (ref 0–6)
ERYTHROCYTE [DISTWIDTH] IN BLOOD BY AUTOMATED COUNT: 14.4 % (ref 11.6–15.1)
EST. AVERAGE GLUCOSE BLD GHB EST-MCNC: 148 MG/DL
GFR SERPL CREATININE-BSD FRML MDRD: 16 ML/MIN/1.73SQ M
GLUCOSE P FAST SERPL-MCNC: 184 MG/DL (ref 65–99)
GLUCOSE UR STRIP-MCNC: ABNORMAL MG/DL
HBA1C MFR BLD: 6.8 %
HCT VFR BLD AUTO: 30.9 % (ref 36.5–49.3)
HGB BLD-MCNC: 10.3 G/DL (ref 12–17)
HGB UR QL STRIP.AUTO: ABNORMAL
HYALINE CASTS #/AREA URNS LPF: NORMAL /LPF
IMM GRANULOCYTES # BLD AUTO: 0.05 THOUSAND/UL (ref 0–0.2)
IMM GRANULOCYTES NFR BLD AUTO: 1 % (ref 0–2)
KETONES UR STRIP-MCNC: NEGATIVE MG/DL
LEUKOCYTE ESTERASE UR QL STRIP: NEGATIVE
LYMPHOCYTES # BLD AUTO: 0.88 THOUSANDS/ΜL (ref 0.6–4.47)
LYMPHOCYTES NFR BLD AUTO: 10 % (ref 14–44)
MAGNESIUM SERPL-MCNC: 2.4 MG/DL (ref 1.6–2.6)
MCH RBC QN AUTO: 31.5 PG (ref 26.8–34.3)
MCHC RBC AUTO-ENTMCNC: 33.3 G/DL (ref 31.4–37.4)
MCV RBC AUTO: 95 FL (ref 82–98)
MONOCYTES # BLD AUTO: 0.85 THOUSAND/ΜL (ref 0.17–1.22)
MONOCYTES NFR BLD AUTO: 10 % (ref 4–12)
NEUTROPHILS # BLD AUTO: 6.27 THOUSANDS/ΜL (ref 1.85–7.62)
NEUTS SEG NFR BLD AUTO: 72 % (ref 43–75)
NITRITE UR QL STRIP: NEGATIVE
NON-SQ EPI CELLS URNS QL MICRO: NORMAL /HPF
NRBC BLD AUTO-RTO: 0 /100 WBCS
PH UR STRIP.AUTO: 7 [PH]
PHOSPHATE SERPL-MCNC: 4.6 MG/DL (ref 2.3–4.1)
PLATELET # BLD AUTO: 249 THOUSANDS/UL (ref 149–390)
PMV BLD AUTO: 9.8 FL (ref 8.9–12.7)
POTASSIUM SERPL-SCNC: 4.1 MMOL/L (ref 3.5–5.3)
PROT SERPL-MCNC: 7.7 G/DL (ref 6.4–8.2)
PROT UR STRIP-MCNC: ABNORMAL MG/DL
PROT UR-MCNC: 335 MG/DL
PROT/CREAT UR: 5.49 MG/G{CREAT} (ref 0–0.1)
PTH-INTACT SERPL-MCNC: 75.8 PG/ML (ref 18.4–80.1)
RBC # BLD AUTO: 3.27 MILLION/UL (ref 3.88–5.62)
RBC #/AREA URNS AUTO: NORMAL /HPF
RH BLD: POSITIVE
SODIUM SERPL-SCNC: 134 MMOL/L (ref 136–145)
SP GR UR STRIP.AUTO: 1.01 (ref 1–1.03)
SPECIMEN EXPIRATION DATE: NORMAL
URATE SERPL-MCNC: 4.3 MG/DL (ref 4.2–8)
UROBILINOGEN UR QL STRIP.AUTO: 0.2 E.U./DL
WBC # BLD AUTO: 8.6 THOUSAND/UL (ref 4.31–10.16)
WBC #/AREA URNS AUTO: NORMAL /HPF

## 2021-10-06 PROCEDURE — 84156 ASSAY OF PROTEIN URINE: CPT

## 2021-10-06 PROCEDURE — 83970 ASSAY OF PARATHORMONE: CPT

## 2021-10-06 PROCEDURE — 86900 BLOOD TYPING SEROLOGIC ABO: CPT | Performed by: COLON & RECTAL SURGERY

## 2021-10-06 PROCEDURE — 84550 ASSAY OF BLOOD/URIC ACID: CPT

## 2021-10-06 PROCEDURE — 83735 ASSAY OF MAGNESIUM: CPT

## 2021-10-06 PROCEDURE — 86901 BLOOD TYPING SEROLOGIC RH(D): CPT | Performed by: COLON & RECTAL SURGERY

## 2021-10-06 PROCEDURE — 36415 COLL VENOUS BLD VENIPUNCTURE: CPT

## 2021-10-06 PROCEDURE — 80053 COMPREHEN METABOLIC PANEL: CPT

## 2021-10-06 PROCEDURE — 82378 CARCINOEMBRYONIC ANTIGEN: CPT

## 2021-10-06 PROCEDURE — 83036 HEMOGLOBIN GLYCOSYLATED A1C: CPT

## 2021-10-06 PROCEDURE — 84100 ASSAY OF PHOSPHORUS: CPT

## 2021-10-06 PROCEDURE — 86850 RBC ANTIBODY SCREEN: CPT | Performed by: COLON & RECTAL SURGERY

## 2021-10-06 PROCEDURE — 81001 URINALYSIS AUTO W/SCOPE: CPT

## 2021-10-06 PROCEDURE — 85025 COMPLETE CBC W/AUTO DIFF WBC: CPT

## 2021-10-06 PROCEDURE — 82570 ASSAY OF URINE CREATININE: CPT

## 2021-10-07 ENCOUNTER — TELEPHONE (OUTPATIENT)
Dept: NEPHROLOGY | Facility: CLINIC | Age: 66
End: 2021-10-07

## 2021-10-08 DIAGNOSIS — N18.4 STAGE 4 CHRONIC KIDNEY DISEASE (HCC): ICD-10-CM

## 2021-10-08 DIAGNOSIS — E87.6 HYPOKALEMIA: ICD-10-CM

## 2021-10-10 RX ORDER — POTASSIUM CHLORIDE 750 MG/1
20 CAPSULE, EXTENDED RELEASE ORAL DAILY
Qty: 30 CAPSULE | Refills: 3 | Status: SHIPPED | OUTPATIENT
Start: 2021-10-10 | End: 2021-12-01 | Stop reason: HOSPADM

## 2021-10-13 ENCOUNTER — PATIENT MESSAGE (OUTPATIENT)
Dept: ENDOCRINOLOGY | Facility: CLINIC | Age: 66
End: 2021-10-13

## 2021-10-21 ENCOUNTER — HOSPITAL ENCOUNTER (INPATIENT)
Facility: HOSPITAL | Age: 66
LOS: 6 days | Discharge: HOME HEALTH | DRG: 330 | End: 2021-10-27
Attending: COLON & RECTAL SURGERY | Admitting: COLON & RECTAL SURGERY
Payer: MEDICARE

## 2021-10-21 ENCOUNTER — ANESTHESIA (OUTPATIENT)
Dept: PERIOP | Facility: HOSPITAL | Age: 66
DRG: 330 | End: 2021-10-21
Payer: MEDICARE

## 2021-10-21 DIAGNOSIS — E86.0 DEHYDRATION: ICD-10-CM

## 2021-10-21 DIAGNOSIS — E11.21 DIABETIC NEPHROPATHY ASSOCIATED WITH TYPE 2 DIABETES MELLITUS (HCC): Primary | ICD-10-CM

## 2021-10-21 DIAGNOSIS — R33.9 URINARY RETENTION: ICD-10-CM

## 2021-10-21 DIAGNOSIS — C80.1 ADENOCARCINOMA IN A POLYP (HCC): ICD-10-CM

## 2021-10-21 LAB
ABO GROUP BLD: NORMAL
ANION GAP SERPL CALCULATED.3IONS-SCNC: 10 MMOL/L (ref 4–13)
BLD GP AB SCN SERPL QL: NEGATIVE
BUN SERPL-MCNC: 57 MG/DL (ref 5–25)
CALCIUM SERPL-MCNC: 7.7 MG/DL (ref 8.3–10.1)
CHLORIDE SERPL-SCNC: 109 MMOL/L (ref 100–108)
CO2 SERPL-SCNC: 18 MMOL/L (ref 21–32)
CREAT SERPL-MCNC: 3.69 MG/DL (ref 0.6–1.3)
FLUAV RNA RESP QL NAA+PROBE: NEGATIVE
FLUBV RNA RESP QL NAA+PROBE: NEGATIVE
GFR SERPL CREATININE-BSD FRML MDRD: 16 ML/MIN/1.73SQ M
GLUCOSE SERPL-MCNC: 190 MG/DL (ref 65–140)
GLUCOSE SERPL-MCNC: 206 MG/DL (ref 65–140)
GLUCOSE SERPL-MCNC: 237 MG/DL (ref 65–140)
GLUCOSE SERPL-MCNC: 239 MG/DL (ref 65–140)
GLUCOSE SERPL-MCNC: 245 MG/DL (ref 65–140)
PLATELET # BLD AUTO: 211 THOUSANDS/UL (ref 149–390)
PMV BLD AUTO: 9.7 FL (ref 8.9–12.7)
POTASSIUM SERPL-SCNC: 3.4 MMOL/L (ref 3.5–5.3)
RH BLD: POSITIVE
RSV RNA RESP QL NAA+PROBE: NEGATIVE
SARS-COV-2 RNA RESP QL NAA+PROBE: NEGATIVE
SODIUM SERPL-SCNC: 137 MMOL/L (ref 136–145)
SPECIMEN EXPIRATION DATE: NORMAL

## 2021-10-21 PROCEDURE — 88309 TISSUE EXAM BY PATHOLOGIST: CPT | Performed by: PATHOLOGY

## 2021-10-21 PROCEDURE — 45330 DIAGNOSTIC SIGMOIDOSCOPY: CPT | Performed by: COLON & RECTAL SURGERY

## 2021-10-21 PROCEDURE — 94760 N-INVAS EAR/PLS OXIMETRY 1: CPT

## 2021-10-21 PROCEDURE — 85049 AUTOMATED PLATELET COUNT: CPT | Performed by: SURGERY

## 2021-10-21 PROCEDURE — 80048 BASIC METABOLIC PNL TOTAL CA: CPT | Performed by: PHYSICIAN ASSISTANT

## 2021-10-21 PROCEDURE — 99222 1ST HOSP IP/OBS MODERATE 55: CPT | Performed by: INTERNAL MEDICINE

## 2021-10-21 PROCEDURE — 0DBP4ZZ EXCISION OF RECTUM, PERCUTANEOUS ENDOSCOPIC APPROACH: ICD-10-PCS | Performed by: COLON & RECTAL SURGERY

## 2021-10-21 PROCEDURE — 86900 BLOOD TYPING SEROLOGIC ABO: CPT | Performed by: STUDENT IN AN ORGANIZED HEALTH CARE EDUCATION/TRAINING PROGRAM

## 2021-10-21 PROCEDURE — 94002 VENT MGMT INPAT INIT DAY: CPT

## 2021-10-21 PROCEDURE — 0241U HB NFCT DS VIR RESP RNA 4 TRGT: CPT | Performed by: STUDENT IN AN ORGANIZED HEALTH CARE EDUCATION/TRAINING PROGRAM

## 2021-10-21 PROCEDURE — 88341 IMHCHEM/IMCYTCHM EA ADD ANTB: CPT | Performed by: PATHOLOGY

## 2021-10-21 PROCEDURE — 0DBN4ZZ EXCISION OF SIGMOID COLON, PERCUTANEOUS ENDOSCOPIC APPROACH: ICD-10-PCS | Performed by: COLON & RECTAL SURGERY

## 2021-10-21 PROCEDURE — 44207 L COLECTOMY/COLOPROCTOSTOMY: CPT | Performed by: COLON & RECTAL SURGERY

## 2021-10-21 PROCEDURE — 88342 IMHCHEM/IMCYTCHM 1ST ANTB: CPT | Performed by: PATHOLOGY

## 2021-10-21 PROCEDURE — 82948 REAGENT STRIP/BLOOD GLUCOSE: CPT

## 2021-10-21 PROCEDURE — 44213 LAP MOBIL SPLENIC FL ADD-ON: CPT | Performed by: COLON & RECTAL SURGERY

## 2021-10-21 PROCEDURE — 86850 RBC ANTIBODY SCREEN: CPT | Performed by: STUDENT IN AN ORGANIZED HEALTH CARE EDUCATION/TRAINING PROGRAM

## 2021-10-21 PROCEDURE — 86901 BLOOD TYPING SEROLOGIC RH(D): CPT | Performed by: STUDENT IN AN ORGANIZED HEALTH CARE EDUCATION/TRAINING PROGRAM

## 2021-10-21 PROCEDURE — 0DJD8ZZ INSPECTION OF LOWER INTESTINAL TRACT, VIA NATURAL OR ARTIFICIAL OPENING ENDOSCOPIC: ICD-10-PCS | Performed by: COLON & RECTAL SURGERY

## 2021-10-21 PROCEDURE — 4A1BXSH MONITORING OF GASTROINTESTINAL VASCULAR PERFUSION USING INDOCYANINE GREEN DYE, EXTERNAL APPROACH: ICD-10-PCS | Performed by: COLON & RECTAL SURGERY

## 2021-10-21 PROCEDURE — 15860 IV NJX TST VASC FLO FLAP/GRF: CPT | Performed by: COLON & RECTAL SURGERY

## 2021-10-21 RX ORDER — HEPARIN SODIUM 5000 [USP'U]/ML
5000 INJECTION, SOLUTION INTRAVENOUS; SUBCUTANEOUS ONCE
Status: COMPLETED | OUTPATIENT
Start: 2021-10-21 | End: 2021-10-21

## 2021-10-21 RX ORDER — METOCLOPRAMIDE HYDROCHLORIDE 5 MG/ML
10 INJECTION INTRAMUSCULAR; INTRAVENOUS ONCE AS NEEDED
Status: DISCONTINUED | OUTPATIENT
Start: 2021-10-21 | End: 2021-10-21 | Stop reason: HOSPADM

## 2021-10-21 RX ORDER — DIPHENHYDRAMINE HYDROCHLORIDE 50 MG/ML
12.5 INJECTION INTRAMUSCULAR; INTRAVENOUS ONCE AS NEEDED
Status: DISCONTINUED | OUTPATIENT
Start: 2021-10-21 | End: 2021-10-21 | Stop reason: HOSPADM

## 2021-10-21 RX ORDER — ONDANSETRON 2 MG/ML
4 INJECTION INTRAMUSCULAR; INTRAVENOUS EVERY 6 HOURS PRN
Status: DISCONTINUED | OUTPATIENT
Start: 2021-10-21 | End: 2021-10-27 | Stop reason: HOSPADM

## 2021-10-21 RX ORDER — MAGNESIUM HYDROXIDE 1200 MG/15ML
LIQUID ORAL AS NEEDED
Status: DISCONTINUED | OUTPATIENT
Start: 2021-10-21 | End: 2021-10-21 | Stop reason: HOSPADM

## 2021-10-21 RX ORDER — POLYETHYLENE GLYCOL 3350 17 G/17G
255 POWDER, FOR SOLUTION ORAL ONCE
Status: DISCONTINUED | OUTPATIENT
Start: 2021-10-21 | End: 2021-10-21 | Stop reason: HOSPADM

## 2021-10-21 RX ORDER — SODIUM CHLORIDE, SODIUM LACTATE, POTASSIUM CHLORIDE, CALCIUM CHLORIDE 600; 310; 30; 20 MG/100ML; MG/100ML; MG/100ML; MG/100ML
INJECTION, SOLUTION INTRAVENOUS CONTINUOUS PRN
Status: DISCONTINUED | OUTPATIENT
Start: 2021-10-21 | End: 2021-10-21

## 2021-10-21 RX ORDER — FENTANYL CITRATE/PF 50 MCG/ML
50 SYRINGE (ML) INJECTION
Status: DISCONTINUED | OUTPATIENT
Start: 2021-10-21 | End: 2021-10-21 | Stop reason: HOSPADM

## 2021-10-21 RX ORDER — ACETAMINOPHEN 325 MG/1
975 TABLET ORAL ONCE
Status: COMPLETED | OUTPATIENT
Start: 2021-10-21 | End: 2021-10-21

## 2021-10-21 RX ORDER — METOPROLOL TARTRATE 5 MG/5ML
10 INJECTION INTRAVENOUS EVERY 6 HOURS
Status: DISCONTINUED | OUTPATIENT
Start: 2021-10-21 | End: 2021-10-23

## 2021-10-21 RX ORDER — NEOSTIGMINE METHYLSULFATE 1 MG/ML
INJECTION INTRAVENOUS AS NEEDED
Status: DISCONTINUED | OUTPATIENT
Start: 2021-10-21 | End: 2021-10-21

## 2021-10-21 RX ORDER — ALBUMIN, HUMAN INJ 5% 5 %
SOLUTION INTRAVENOUS CONTINUOUS PRN
Status: DISCONTINUED | OUTPATIENT
Start: 2021-10-21 | End: 2021-10-21

## 2021-10-21 RX ORDER — MIDAZOLAM HYDROCHLORIDE 2 MG/2ML
INJECTION, SOLUTION INTRAMUSCULAR; INTRAVENOUS AS NEEDED
Status: DISCONTINUED | OUTPATIENT
Start: 2021-10-21 | End: 2021-10-21

## 2021-10-21 RX ORDER — HEPARIN SODIUM 5000 [USP'U]/ML
5000 INJECTION, SOLUTION INTRAVENOUS; SUBCUTANEOUS EVERY 8 HOURS SCHEDULED
Status: DISCONTINUED | OUTPATIENT
Start: 2021-10-21 | End: 2021-10-27 | Stop reason: HOSPADM

## 2021-10-21 RX ORDER — ONDANSETRON 2 MG/ML
INJECTION INTRAMUSCULAR; INTRAVENOUS AS NEEDED
Status: DISCONTINUED | OUTPATIENT
Start: 2021-10-21 | End: 2021-10-21

## 2021-10-21 RX ORDER — FUROSEMIDE 10 MG/ML
80 INJECTION INTRAMUSCULAR; INTRAVENOUS ONCE
Status: COMPLETED | OUTPATIENT
Start: 2021-10-21 | End: 2021-10-21

## 2021-10-21 RX ORDER — FENTANYL CITRATE 50 UG/ML
INJECTION, SOLUTION INTRAMUSCULAR; INTRAVENOUS AS NEEDED
Status: DISCONTINUED | OUTPATIENT
Start: 2021-10-21 | End: 2021-10-21

## 2021-10-21 RX ORDER — DEXMEDETOMIDINE HYDROCHLORIDE 100 UG/ML
INJECTION, SOLUTION INTRAVENOUS AS NEEDED
Status: DISCONTINUED | OUTPATIENT
Start: 2021-10-21 | End: 2021-10-21

## 2021-10-21 RX ORDER — DEXAMETHASONE SODIUM PHOSPHATE 10 MG/ML
INJECTION, SOLUTION INTRAMUSCULAR; INTRAVENOUS AS NEEDED
Status: DISCONTINUED | OUTPATIENT
Start: 2021-10-21 | End: 2021-10-21

## 2021-10-21 RX ORDER — HYDROMORPHONE HCL/PF 1 MG/ML
0.5 SYRINGE (ML) INJECTION
Status: DISCONTINUED | OUTPATIENT
Start: 2021-10-21 | End: 2021-10-21 | Stop reason: HOSPADM

## 2021-10-21 RX ORDER — GLYCOPYRROLATE 0.2 MG/ML
INJECTION INTRAMUSCULAR; INTRAVENOUS AS NEEDED
Status: DISCONTINUED | OUTPATIENT
Start: 2021-10-21 | End: 2021-10-21

## 2021-10-21 RX ORDER — CEFAZOLIN SODIUM 2 G/50ML
SOLUTION INTRAVENOUS AS NEEDED
Status: DISCONTINUED | OUTPATIENT
Start: 2021-10-21 | End: 2021-10-21

## 2021-10-21 RX ORDER — PROMETHAZINE HYDROCHLORIDE 25 MG/ML
25 INJECTION, SOLUTION INTRAMUSCULAR; INTRAVENOUS ONCE AS NEEDED
Status: DISCONTINUED | OUTPATIENT
Start: 2021-10-21 | End: 2021-10-21 | Stop reason: HOSPADM

## 2021-10-21 RX ORDER — KETAMINE HYDROCHLORIDE 50 MG/ML
INJECTION, SOLUTION, CONCENTRATE INTRAMUSCULAR; INTRAVENOUS AS NEEDED
Status: DISCONTINUED | OUTPATIENT
Start: 2021-10-21 | End: 2021-10-21

## 2021-10-21 RX ORDER — HYDROMORPHONE HCL 110MG/55ML
PATIENT CONTROLLED ANALGESIA SYRINGE INTRAVENOUS AS NEEDED
Status: DISCONTINUED | OUTPATIENT
Start: 2021-10-21 | End: 2021-10-21

## 2021-10-21 RX ORDER — SODIUM CHLORIDE, SODIUM LACTATE, POTASSIUM CHLORIDE, CALCIUM CHLORIDE 600; 310; 30; 20 MG/100ML; MG/100ML; MG/100ML; MG/100ML
60 INJECTION, SOLUTION INTRAVENOUS CONTINUOUS
Status: DISCONTINUED | OUTPATIENT
Start: 2021-10-21 | End: 2021-10-23

## 2021-10-21 RX ORDER — LABETALOL 20 MG/4 ML (5 MG/ML) INTRAVENOUS SYRINGE
10 EVERY 4 HOURS PRN
Status: DISCONTINUED | OUTPATIENT
Start: 2021-10-21 | End: 2021-10-27 | Stop reason: HOSPADM

## 2021-10-21 RX ORDER — HYDRALAZINE HYDROCHLORIDE 20 MG/ML
5 INJECTION INTRAMUSCULAR; INTRAVENOUS EVERY 4 HOURS PRN
Status: DISCONTINUED | OUTPATIENT
Start: 2021-10-21 | End: 2021-10-27 | Stop reason: HOSPADM

## 2021-10-21 RX ORDER — SODIUM CHLORIDE, SODIUM LACTATE, POTASSIUM CHLORIDE, CALCIUM CHLORIDE 600; 310; 30; 20 MG/100ML; MG/100ML; MG/100ML; MG/100ML
75 INJECTION, SOLUTION INTRAVENOUS CONTINUOUS
Status: DISCONTINUED | OUTPATIENT
Start: 2021-10-21 | End: 2021-10-21

## 2021-10-21 RX ORDER — SODIUM CHLORIDE 9 MG/ML
INJECTION, SOLUTION INTRAVENOUS CONTINUOUS PRN
Status: DISCONTINUED | OUTPATIENT
Start: 2021-10-21 | End: 2021-10-21

## 2021-10-21 RX ORDER — FUROSEMIDE 10 MG/ML
INJECTION INTRAMUSCULAR; INTRAVENOUS AS NEEDED
Status: DISCONTINUED | OUTPATIENT
Start: 2021-10-21 | End: 2021-10-21

## 2021-10-21 RX ORDER — ROCURONIUM BROMIDE 10 MG/ML
INJECTION, SOLUTION INTRAVENOUS AS NEEDED
Status: DISCONTINUED | OUTPATIENT
Start: 2021-10-21 | End: 2021-10-21

## 2021-10-21 RX ORDER — LIDOCAINE HYDROCHLORIDE 10 MG/ML
INJECTION, SOLUTION EPIDURAL; INFILTRATION; INTRACAUDAL; PERINEURAL AS NEEDED
Status: DISCONTINUED | OUTPATIENT
Start: 2021-10-21 | End: 2021-10-21

## 2021-10-21 RX ORDER — INDOCYANINE GREEN AND WATER 25 MG
KIT INJECTION AS NEEDED
Status: DISCONTINUED | OUTPATIENT
Start: 2021-10-21 | End: 2021-10-21

## 2021-10-21 RX ORDER — CEFAZOLIN SODIUM 2 G/50ML
2000 SOLUTION INTRAVENOUS ONCE
Status: DISCONTINUED | OUTPATIENT
Start: 2021-10-21 | End: 2021-10-21 | Stop reason: HOSPADM

## 2021-10-21 RX ORDER — PROPOFOL 10 MG/ML
INJECTION, EMULSION INTRAVENOUS AS NEEDED
Status: DISCONTINUED | OUTPATIENT
Start: 2021-10-21 | End: 2021-10-21

## 2021-10-21 RX ADMIN — LIDOCAINE HYDROCHLORIDE 90 MG: 10 INJECTION, SOLUTION EPIDURAL; INFILTRATION; INTRACAUDAL; PERINEURAL at 08:58

## 2021-10-21 RX ADMIN — CEFAZOLIN SODIUM 2000 MG: 2 SOLUTION INTRAVENOUS at 09:17

## 2021-10-21 RX ADMIN — SODIUM CHLORIDE, SODIUM LACTATE, POTASSIUM CHLORIDE, CALCIUM CHLORIDE: 600; 310; 30; 20 INJECTION, SOLUTION INTRAVENOUS at 12:27

## 2021-10-21 RX ADMIN — KETAMINE HYDROCHLORIDE 10 MG: 50 INJECTION, SOLUTION INTRAMUSCULAR; INTRAVENOUS at 11:02

## 2021-10-21 RX ADMIN — ONDANSETRON 4 MG: 2 INJECTION INTRAMUSCULAR; INTRAVENOUS at 12:32

## 2021-10-21 RX ADMIN — FUROSEMIDE 20 MG: 10 INJECTION, SOLUTION INTRAVENOUS at 12:06

## 2021-10-21 RX ADMIN — INSULIN LISPRO 3 UNITS: 100 INJECTION, SOLUTION INTRAVENOUS; SUBCUTANEOUS at 13:59

## 2021-10-21 RX ADMIN — FUROSEMIDE 20 MG: 10 INJECTION, SOLUTION INTRAVENOUS at 11:17

## 2021-10-21 RX ADMIN — INSULIN LISPRO 3 UNITS: 100 INJECTION, SOLUTION INTRAVENOUS; SUBCUTANEOUS at 18:26

## 2021-10-21 RX ADMIN — HYDROMORPHONE HYDROCHLORIDE 0.5 MG: 2 INJECTION, SOLUTION INTRAMUSCULAR; INTRAVENOUS; SUBCUTANEOUS at 09:49

## 2021-10-21 RX ADMIN — FUROSEMIDE 80 MG: 10 INJECTION, SOLUTION INTRAMUSCULAR; INTRAVENOUS at 15:23

## 2021-10-21 RX ADMIN — FENTANYL CITRATE 50 MCG: 50 INJECTION INTRAMUSCULAR; INTRAVENOUS at 08:58

## 2021-10-21 RX ADMIN — Medication 500 MG: at 09:24

## 2021-10-21 RX ADMIN — SODIUM CHLORIDE, SODIUM LACTATE, POTASSIUM CHLORIDE, CALCIUM CHLORIDE: 600; 310; 30; 20 INJECTION, SOLUTION INTRAVENOUS at 10:42

## 2021-10-21 RX ADMIN — FENTANYL CITRATE 50 MCG: 50 INJECTION INTRAMUSCULAR; INTRAVENOUS at 08:23

## 2021-10-21 RX ADMIN — KETAMINE HYDROCHLORIDE 10 MG: 50 INJECTION, SOLUTION INTRAMUSCULAR; INTRAVENOUS at 09:59

## 2021-10-21 RX ADMIN — DEXMEDETOMIDINE 12 MCG: 100 INJECTION, SOLUTION, CONCENTRATE INTRAVENOUS at 09:59

## 2021-10-21 RX ADMIN — HYDROMORPHONE HYDROCHLORIDE 0.5 MG: 2 INJECTION, SOLUTION INTRAMUSCULAR; INTRAVENOUS; SUBCUTANEOUS at 09:47

## 2021-10-21 RX ADMIN — HYDROMORPHONE HYDROCHLORIDE: 10 INJECTION INTRAMUSCULAR; INTRAVENOUS; SUBCUTANEOUS at 15:30

## 2021-10-21 RX ADMIN — HEPARIN SODIUM 5000 UNITS: 5000 INJECTION INTRAVENOUS; SUBCUTANEOUS at 21:46

## 2021-10-21 RX ADMIN — NEOSTIGMINE METHYLSULFATE 5 MG: 1 INJECTION INTRAVENOUS at 12:41

## 2021-10-21 RX ADMIN — PROPOFOL 180 MG: 10 INJECTION, EMULSION INTRAVENOUS at 08:58

## 2021-10-21 RX ADMIN — HYDROMORPHONE HYDROCHLORIDE 0.2 MG: 2 INJECTION, SOLUTION INTRAMUSCULAR; INTRAVENOUS; SUBCUTANEOUS at 10:16

## 2021-10-21 RX ADMIN — KETAMINE HYDROCHLORIDE 20 MG: 50 INJECTION, SOLUTION INTRAMUSCULAR; INTRAVENOUS at 09:25

## 2021-10-21 RX ADMIN — HYDROMORPHONE HYDROCHLORIDE 0.2 MG: 2 INJECTION, SOLUTION INTRAMUSCULAR; INTRAVENOUS; SUBCUTANEOUS at 11:03

## 2021-10-21 RX ADMIN — ROCURONIUM BROMIDE 60 MG: 50 INJECTION, SOLUTION INTRAVENOUS at 09:01

## 2021-10-21 RX ADMIN — MIDAZOLAM 2 MG: 1 INJECTION INTRAMUSCULAR; INTRAVENOUS at 08:48

## 2021-10-21 RX ADMIN — FENTANYL CITRATE 50 MCG: 50 INJECTION INTRAMUSCULAR; INTRAVENOUS at 09:58

## 2021-10-21 RX ADMIN — SODIUM CHLORIDE, SODIUM LACTATE, POTASSIUM CHLORIDE, CALCIUM CHLORIDE: 600; 310; 30; 20 INJECTION, SOLUTION INTRAVENOUS at 09:47

## 2021-10-21 RX ADMIN — SODIUM CHLORIDE: 0.9 INJECTION, SOLUTION INTRAVENOUS at 09:06

## 2021-10-21 RX ADMIN — DEXAMETHASONE SODIUM PHOSPHATE 10 MG: 10 INJECTION, SOLUTION INTRAMUSCULAR; INTRAVENOUS at 09:02

## 2021-10-21 RX ADMIN — SODIUM CHLORIDE, SODIUM LACTATE, POTASSIUM CHLORIDE, AND CALCIUM CHLORIDE: .6; .31; .03; .02 INJECTION, SOLUTION INTRAVENOUS at 11:44

## 2021-10-21 RX ADMIN — METOROPROLOL TARTRATE 10 MG: 5 INJECTION, SOLUTION INTRAVENOUS at 21:46

## 2021-10-21 RX ADMIN — KETAMINE HYDROCHLORIDE 10 MG: 50 INJECTION, SOLUTION INTRAMUSCULAR; INTRAVENOUS at 12:07

## 2021-10-21 RX ADMIN — FENTANYL CITRATE 25 MCG: 50 INJECTION INTRAMUSCULAR; INTRAVENOUS at 10:19

## 2021-10-21 RX ADMIN — PHENYLEPHRINE HYDROCHLORIDE 30 MCG/MIN: 10 INJECTION INTRAVENOUS at 09:06

## 2021-10-21 RX ADMIN — ACETAMINOPHEN 975 MG: 325 TABLET, FILM COATED ORAL at 08:37

## 2021-10-21 RX ADMIN — ALBUMIN (HUMAN): 12.5 INJECTION, SOLUTION INTRAVENOUS at 10:39

## 2021-10-21 RX ADMIN — FENTANYL CITRATE 100 MCG: 50 INJECTION INTRAMUSCULAR; INTRAVENOUS at 09:46

## 2021-10-21 RX ADMIN — DEXMEDETOMIDINE 8 MCG: 100 INJECTION, SOLUTION, CONCENTRATE INTRAVENOUS at 08:54

## 2021-10-21 RX ADMIN — HYDROMORPHONE HYDROCHLORIDE 0.2 MG: 2 INJECTION, SOLUTION INTRAMUSCULAR; INTRAVENOUS; SUBCUTANEOUS at 12:13

## 2021-10-21 RX ADMIN — HEPARIN SODIUM 5000 UNITS: 5000 INJECTION INTRAVENOUS; SUBCUTANEOUS at 08:37

## 2021-10-21 RX ADMIN — INDOCYANINE GREEN AND WATER 6.25 MG: KIT at 11:55

## 2021-10-21 RX ADMIN — GLYCOPYRROLATE 1 MG: 0.2 INJECTION, SOLUTION INTRAMUSCULAR; INTRAVENOUS at 12:41

## 2021-10-21 RX ADMIN — INDOCYANINE GREEN AND WATER 7.5 MG: KIT at 12:05

## 2021-10-21 RX ADMIN — SODIUM CHLORIDE, SODIUM LACTATE, POTASSIUM CHLORIDE, AND CALCIUM CHLORIDE: .6; .31; .03; .02 INJECTION, SOLUTION INTRAVENOUS at 08:43

## 2021-10-22 LAB
ANION GAP SERPL CALCULATED.3IONS-SCNC: 9 MMOL/L (ref 4–13)
BASOPHILS # BLD AUTO: 0.01 THOUSANDS/ΜL (ref 0–0.1)
BASOPHILS NFR BLD AUTO: 0 % (ref 0–1)
BUN SERPL-MCNC: 61 MG/DL (ref 5–25)
CALCIUM SERPL-MCNC: 7.8 MG/DL (ref 8.3–10.1)
CHLORIDE SERPL-SCNC: 112 MMOL/L (ref 100–108)
CO2 SERPL-SCNC: 17 MMOL/L (ref 21–32)
CREAT SERPL-MCNC: 3.79 MG/DL (ref 0.6–1.3)
EOSINOPHIL # BLD AUTO: 0 THOUSAND/ΜL (ref 0–0.61)
EOSINOPHIL NFR BLD AUTO: 0 % (ref 0–6)
ERYTHROCYTE [DISTWIDTH] IN BLOOD BY AUTOMATED COUNT: 15.6 % (ref 11.6–15.1)
GFR SERPL CREATININE-BSD FRML MDRD: 15 ML/MIN/1.73SQ M
GLUCOSE SERPL-MCNC: 164 MG/DL (ref 65–140)
GLUCOSE SERPL-MCNC: 176 MG/DL (ref 65–140)
GLUCOSE SERPL-MCNC: 201 MG/DL (ref 65–140)
GLUCOSE SERPL-MCNC: 219 MG/DL (ref 65–140)
HCT VFR BLD AUTO: 24.4 % (ref 36.5–49.3)
HGB BLD-MCNC: 8.3 G/DL (ref 12–17)
IMM GRANULOCYTES # BLD AUTO: 0.04 THOUSAND/UL (ref 0–0.2)
IMM GRANULOCYTES NFR BLD AUTO: 0 % (ref 0–2)
LYMPHOCYTES # BLD AUTO: 0.42 THOUSANDS/ΜL (ref 0.6–4.47)
LYMPHOCYTES NFR BLD AUTO: 3 % (ref 14–44)
MCH RBC QN AUTO: 32 PG (ref 26.8–34.3)
MCHC RBC AUTO-ENTMCNC: 34 G/DL (ref 31.4–37.4)
MCV RBC AUTO: 94 FL (ref 82–98)
MONOCYTES # BLD AUTO: 0.85 THOUSAND/ΜL (ref 0.17–1.22)
MONOCYTES NFR BLD AUTO: 7 % (ref 4–12)
NEUTROPHILS # BLD AUTO: 11.17 THOUSANDS/ΜL (ref 1.85–7.62)
NEUTS SEG NFR BLD AUTO: 90 % (ref 43–75)
NRBC BLD AUTO-RTO: 0 /100 WBCS
PLATELET # BLD AUTO: 193 THOUSANDS/UL (ref 149–390)
PMV BLD AUTO: 9.2 FL (ref 8.9–12.7)
POTASSIUM SERPL-SCNC: 3.4 MMOL/L (ref 3.5–5.3)
RBC # BLD AUTO: 2.59 MILLION/UL (ref 3.88–5.62)
SODIUM SERPL-SCNC: 138 MMOL/L (ref 136–145)
WBC # BLD AUTO: 12.49 THOUSAND/UL (ref 4.31–10.16)

## 2021-10-22 PROCEDURE — 85025 COMPLETE CBC W/AUTO DIFF WBC: CPT

## 2021-10-22 PROCEDURE — 99232 SBSQ HOSP IP/OBS MODERATE 35: CPT | Performed by: INTERNAL MEDICINE

## 2021-10-22 PROCEDURE — 82948 REAGENT STRIP/BLOOD GLUCOSE: CPT

## 2021-10-22 PROCEDURE — 97163 PT EVAL HIGH COMPLEX 45 MIN: CPT

## 2021-10-22 PROCEDURE — 80048 BASIC METABOLIC PNL TOTAL CA: CPT | Performed by: PHYSICIAN ASSISTANT

## 2021-10-22 PROCEDURE — 97167 OT EVAL HIGH COMPLEX 60 MIN: CPT

## 2021-10-22 RX ORDER — POTASSIUM CHLORIDE 14.9 MG/ML
20 INJECTION INTRAVENOUS ONCE
Status: COMPLETED | OUTPATIENT
Start: 2021-10-22 | End: 2021-10-22

## 2021-10-22 RX ADMIN — HEPARIN SODIUM 5000 UNITS: 5000 INJECTION INTRAVENOUS; SUBCUTANEOUS at 15:17

## 2021-10-22 RX ADMIN — SODIUM CHLORIDE, SODIUM LACTATE, POTASSIUM CHLORIDE, AND CALCIUM CHLORIDE 75 ML/HR: .6; .31; .03; .02 INJECTION, SOLUTION INTRAVENOUS at 06:41

## 2021-10-22 RX ADMIN — INSULIN LISPRO 1 UNITS: 100 INJECTION, SOLUTION INTRAVENOUS; SUBCUTANEOUS at 23:57

## 2021-10-22 RX ADMIN — POTASSIUM CHLORIDE 20 MEQ: 14.9 INJECTION, SOLUTION INTRAVENOUS at 12:29

## 2021-10-22 RX ADMIN — METOROPROLOL TARTRATE 10 MG: 5 INJECTION, SOLUTION INTRAVENOUS at 08:47

## 2021-10-22 RX ADMIN — HEPARIN SODIUM 5000 UNITS: 5000 INJECTION INTRAVENOUS; SUBCUTANEOUS at 22:22

## 2021-10-22 RX ADMIN — METOROPROLOL TARTRATE 10 MG: 5 INJECTION, SOLUTION INTRAVENOUS at 22:22

## 2021-10-22 RX ADMIN — HEPARIN SODIUM 5000 UNITS: 5000 INJECTION INTRAVENOUS; SUBCUTANEOUS at 05:31

## 2021-10-22 RX ADMIN — METOROPROLOL TARTRATE 10 MG: 5 INJECTION, SOLUTION INTRAVENOUS at 03:24

## 2021-10-22 RX ADMIN — INSULIN LISPRO 2 UNITS: 100 INJECTION, SOLUTION INTRAVENOUS; SUBCUTANEOUS at 00:41

## 2021-10-22 RX ADMIN — METOROPROLOL TARTRATE 10 MG: 5 INJECTION, SOLUTION INTRAVENOUS at 15:17

## 2021-10-22 RX ADMIN — INSULIN LISPRO 1 UNITS: 100 INJECTION, SOLUTION INTRAVENOUS; SUBCUTANEOUS at 05:38

## 2021-10-22 RX ADMIN — INSULIN LISPRO 2 UNITS: 100 INJECTION, SOLUTION INTRAVENOUS; SUBCUTANEOUS at 18:35

## 2021-10-23 PROBLEM — C18.9 COLON CANCER (HCC): Status: ACTIVE | Noted: 2021-01-01

## 2021-10-23 LAB
ANION GAP SERPL CALCULATED.3IONS-SCNC: 11 MMOL/L (ref 4–13)
BUN SERPL-MCNC: 73 MG/DL (ref 5–25)
CALCIUM SERPL-MCNC: 8.9 MG/DL (ref 8.3–10.1)
CHLORIDE SERPL-SCNC: 105 MMOL/L (ref 100–108)
CO2 SERPL-SCNC: 19 MMOL/L (ref 21–32)
CREAT SERPL-MCNC: 4.02 MG/DL (ref 0.6–1.3)
GFR SERPL CREATININE-BSD FRML MDRD: 14 ML/MIN/1.73SQ M
GLUCOSE SERPL-MCNC: 147 MG/DL (ref 65–140)
GLUCOSE SERPL-MCNC: 153 MG/DL (ref 65–140)
GLUCOSE SERPL-MCNC: 164 MG/DL (ref 65–140)
GLUCOSE SERPL-MCNC: 170 MG/DL (ref 65–140)
GLUCOSE SERPL-MCNC: 198 MG/DL (ref 65–140)
GLUCOSE SERPL-MCNC: 209 MG/DL (ref 65–140)
MAGNESIUM SERPL-MCNC: 2.5 MG/DL (ref 1.6–2.6)
POTASSIUM SERPL-SCNC: 3.8 MMOL/L (ref 3.5–5.3)
SODIUM SERPL-SCNC: 135 MMOL/L (ref 136–145)

## 2021-10-23 PROCEDURE — 80048 BASIC METABOLIC PNL TOTAL CA: CPT | Performed by: PHYSICIAN ASSISTANT

## 2021-10-23 PROCEDURE — 83735 ASSAY OF MAGNESIUM: CPT | Performed by: PHYSICIAN ASSISTANT

## 2021-10-23 PROCEDURE — 82948 REAGENT STRIP/BLOOD GLUCOSE: CPT

## 2021-10-23 PROCEDURE — 94760 N-INVAS EAR/PLS OXIMETRY 1: CPT

## 2021-10-23 PROCEDURE — 99232 SBSQ HOSP IP/OBS MODERATE 35: CPT | Performed by: INTERNAL MEDICINE

## 2021-10-23 RX ORDER — OXYCODONE HYDROCHLORIDE 5 MG/1
5 TABLET ORAL EVERY 4 HOURS PRN
Status: DISCONTINUED | OUTPATIENT
Start: 2021-10-23 | End: 2021-10-27 | Stop reason: HOSPADM

## 2021-10-23 RX ORDER — OXYCODONE HYDROCHLORIDE 10 MG/1
10 TABLET ORAL EVERY 4 HOURS PRN
Status: DISCONTINUED | OUTPATIENT
Start: 2021-10-23 | End: 2021-10-27 | Stop reason: HOSPADM

## 2021-10-23 RX ORDER — METOPROLOL TARTRATE 50 MG/1
50 TABLET, FILM COATED ORAL EVERY 12 HOURS
Status: DISCONTINUED | OUTPATIENT
Start: 2021-10-23 | End: 2021-10-27 | Stop reason: HOSPADM

## 2021-10-23 RX ORDER — ACETAMINOPHEN 325 MG/1
975 TABLET ORAL EVERY 6 HOURS PRN
Status: DISCONTINUED | OUTPATIENT
Start: 2021-10-23 | End: 2021-10-27 | Stop reason: HOSPADM

## 2021-10-23 RX ORDER — HYDROMORPHONE HCL/PF 1 MG/ML
0.5 SYRINGE (ML) INJECTION
Status: DISCONTINUED | OUTPATIENT
Start: 2021-10-23 | End: 2021-10-27 | Stop reason: HOSPADM

## 2021-10-23 RX ADMIN — OXYCODONE HYDROCHLORIDE 10 MG: 10 TABLET ORAL at 13:45

## 2021-10-23 RX ADMIN — HYDROMORPHONE HYDROCHLORIDE 0.5 MG: 1 INJECTION, SOLUTION INTRAMUSCULAR; INTRAVENOUS; SUBCUTANEOUS at 17:43

## 2021-10-23 RX ADMIN — HEPARIN SODIUM 5000 UNITS: 5000 INJECTION INTRAVENOUS; SUBCUTANEOUS at 13:41

## 2021-10-23 RX ADMIN — HEPARIN SODIUM 5000 UNITS: 5000 INJECTION INTRAVENOUS; SUBCUTANEOUS at 05:06

## 2021-10-23 RX ADMIN — INSULIN LISPRO 2 UNITS: 100 INJECTION, SOLUTION INTRAVENOUS; SUBCUTANEOUS at 17:27

## 2021-10-23 RX ADMIN — INSULIN LISPRO 1 UNITS: 100 INJECTION, SOLUTION INTRAVENOUS; SUBCUTANEOUS at 11:44

## 2021-10-23 RX ADMIN — METOROPROLOL TARTRATE 10 MG: 5 INJECTION, SOLUTION INTRAVENOUS at 09:58

## 2021-10-23 RX ADMIN — METOROPROLOL TARTRATE 10 MG: 5 INJECTION, SOLUTION INTRAVENOUS at 05:05

## 2021-10-23 RX ADMIN — METOPROLOL TARTRATE 50 MG: 50 TABLET, FILM COATED ORAL at 15:16

## 2021-10-23 RX ADMIN — INSULIN LISPRO 1 UNITS: 100 INJECTION, SOLUTION INTRAVENOUS; SUBCUTANEOUS at 05:13

## 2021-10-23 RX ADMIN — HEPARIN SODIUM 5000 UNITS: 5000 INJECTION INTRAVENOUS; SUBCUTANEOUS at 21:22

## 2021-10-24 LAB
ANION GAP SERPL CALCULATED.3IONS-SCNC: 8 MMOL/L (ref 4–13)
BASOPHILS # BLD AUTO: 0.04 THOUSANDS/ΜL (ref 0–0.1)
BASOPHILS NFR BLD AUTO: 0 % (ref 0–1)
BUN SERPL-MCNC: 78 MG/DL (ref 5–25)
CALCIUM SERPL-MCNC: 8.9 MG/DL (ref 8.3–10.1)
CHLORIDE SERPL-SCNC: 101 MMOL/L (ref 100–108)
CO2 SERPL-SCNC: 21 MMOL/L (ref 21–32)
CREAT SERPL-MCNC: 4.13 MG/DL (ref 0.6–1.3)
EOSINOPHIL # BLD AUTO: 0.05 THOUSAND/ΜL (ref 0–0.61)
EOSINOPHIL NFR BLD AUTO: 0 % (ref 0–6)
ERYTHROCYTE [DISTWIDTH] IN BLOOD BY AUTOMATED COUNT: 15.2 % (ref 11.6–15.1)
GFR SERPL CREATININE-BSD FRML MDRD: 14 ML/MIN/1.73SQ M
GLUCOSE SERPL-MCNC: 188 MG/DL (ref 65–140)
GLUCOSE SERPL-MCNC: 208 MG/DL (ref 65–140)
GLUCOSE SERPL-MCNC: 215 MG/DL (ref 65–140)
GLUCOSE SERPL-MCNC: 229 MG/DL (ref 65–140)
GLUCOSE SERPL-MCNC: 253 MG/DL (ref 65–140)
HCT VFR BLD AUTO: 29.4 % (ref 36.5–49.3)
HGB BLD-MCNC: 9.9 G/DL (ref 12–17)
IMM GRANULOCYTES # BLD AUTO: 0.2 THOUSAND/UL (ref 0–0.2)
IMM GRANULOCYTES NFR BLD AUTO: 1 % (ref 0–2)
LYMPHOCYTES # BLD AUTO: 0.55 THOUSANDS/ΜL (ref 0.6–4.47)
LYMPHOCYTES NFR BLD AUTO: 3 % (ref 14–44)
MCH RBC QN AUTO: 32 PG (ref 26.8–34.3)
MCHC RBC AUTO-ENTMCNC: 33.7 G/DL (ref 31.4–37.4)
MCV RBC AUTO: 95 FL (ref 82–98)
MONOCYTES # BLD AUTO: 1.19 THOUSAND/ΜL (ref 0.17–1.22)
MONOCYTES NFR BLD AUTO: 6 % (ref 4–12)
NEUTROPHILS # BLD AUTO: 16.43 THOUSANDS/ΜL (ref 1.85–7.62)
NEUTS SEG NFR BLD AUTO: 90 % (ref 43–75)
NRBC BLD AUTO-RTO: 0 /100 WBCS
PLATELET # BLD AUTO: 225 THOUSANDS/UL (ref 149–390)
PMV BLD AUTO: 9.6 FL (ref 8.9–12.7)
POTASSIUM SERPL-SCNC: 4 MMOL/L (ref 3.5–5.3)
RBC # BLD AUTO: 3.09 MILLION/UL (ref 3.88–5.62)
SODIUM SERPL-SCNC: 130 MMOL/L (ref 136–145)
WBC # BLD AUTO: 18.46 THOUSAND/UL (ref 4.31–10.16)

## 2021-10-24 PROCEDURE — 80048 BASIC METABOLIC PNL TOTAL CA: CPT

## 2021-10-24 PROCEDURE — 85025 COMPLETE CBC W/AUTO DIFF WBC: CPT

## 2021-10-24 PROCEDURE — 82948 REAGENT STRIP/BLOOD GLUCOSE: CPT

## 2021-10-24 PROCEDURE — 99232 SBSQ HOSP IP/OBS MODERATE 35: CPT | Performed by: INTERNAL MEDICINE

## 2021-10-24 RX ORDER — TAMSULOSIN HYDROCHLORIDE 0.4 MG/1
0.4 CAPSULE ORAL
Status: DISCONTINUED | OUTPATIENT
Start: 2021-10-24 | End: 2021-10-27 | Stop reason: HOSPADM

## 2021-10-24 RX ORDER — AMLODIPINE BESYLATE 10 MG/1
10 TABLET ORAL DAILY
Status: DISCONTINUED | OUTPATIENT
Start: 2021-10-24 | End: 2021-10-27 | Stop reason: HOSPADM

## 2021-10-24 RX ORDER — SIMVASTATIN 10 MG
20 TABLET ORAL
Status: DISCONTINUED | OUTPATIENT
Start: 2021-10-24 | End: 2021-10-24

## 2021-10-24 RX ORDER — FAMOTIDINE 20 MG/1
20 TABLET, FILM COATED ORAL DAILY
Status: DISCONTINUED | OUTPATIENT
Start: 2021-10-24 | End: 2021-10-27 | Stop reason: HOSPADM

## 2021-10-24 RX ORDER — ALLOPURINOL 300 MG/1
300 TABLET ORAL EVERY MORNING
Status: DISCONTINUED | OUTPATIENT
Start: 2021-10-24 | End: 2021-10-24

## 2021-10-24 RX ORDER — TORSEMIDE 20 MG/1
40 TABLET ORAL ONCE
Status: COMPLETED | OUTPATIENT
Start: 2021-10-24 | End: 2021-10-24

## 2021-10-24 RX ORDER — TAMSULOSIN HYDROCHLORIDE 0.4 MG/1
0.4 CAPSULE ORAL ONCE
Status: COMPLETED | OUTPATIENT
Start: 2021-10-24 | End: 2021-10-24

## 2021-10-24 RX ORDER — PRAVASTATIN SODIUM 40 MG
40 TABLET ORAL
Status: DISCONTINUED | OUTPATIENT
Start: 2021-10-24 | End: 2021-10-27 | Stop reason: HOSPADM

## 2021-10-24 RX ADMIN — PRAVASTATIN SODIUM 40 MG: 40 TABLET ORAL at 23:01

## 2021-10-24 RX ADMIN — HEPARIN SODIUM 5000 UNITS: 5000 INJECTION INTRAVENOUS; SUBCUTANEOUS at 23:01

## 2021-10-24 RX ADMIN — TAMSULOSIN HYDROCHLORIDE 0.4 MG: 0.4 CAPSULE ORAL at 16:22

## 2021-10-24 RX ADMIN — OXYCODONE HYDROCHLORIDE 10 MG: 10 TABLET ORAL at 19:19

## 2021-10-24 RX ADMIN — OXYCODONE HYDROCHLORIDE 10 MG: 10 TABLET ORAL at 01:10

## 2021-10-24 RX ADMIN — OXYCODONE HYDROCHLORIDE 10 MG: 10 TABLET ORAL at 06:14

## 2021-10-24 RX ADMIN — AMLODIPINE BESYLATE 10 MG: 10 TABLET ORAL at 08:28

## 2021-10-24 RX ADMIN — HYDROMORPHONE HYDROCHLORIDE 0.5 MG: 1 INJECTION, SOLUTION INTRAMUSCULAR; INTRAVENOUS; SUBCUTANEOUS at 23:09

## 2021-10-24 RX ADMIN — HEPARIN SODIUM 5000 UNITS: 5000 INJECTION INTRAVENOUS; SUBCUTANEOUS at 13:33

## 2021-10-24 RX ADMIN — HYDROMORPHONE HYDROCHLORIDE 0.5 MG: 1 INJECTION, SOLUTION INTRAMUSCULAR; INTRAVENOUS; SUBCUTANEOUS at 07:35

## 2021-10-24 RX ADMIN — METOPROLOL TARTRATE 50 MG: 50 TABLET, FILM COATED ORAL at 01:53

## 2021-10-24 RX ADMIN — TORSEMIDE 40 MG: 20 TABLET ORAL at 16:22

## 2021-10-24 RX ADMIN — FAMOTIDINE 20 MG: 20 TABLET ORAL at 08:28

## 2021-10-24 RX ADMIN — HEPARIN SODIUM 5000 UNITS: 5000 INJECTION INTRAVENOUS; SUBCUTANEOUS at 06:11

## 2021-10-24 RX ADMIN — METOPROLOL TARTRATE 50 MG: 50 TABLET, FILM COATED ORAL at 13:33

## 2021-10-24 RX ADMIN — TAMSULOSIN HYDROCHLORIDE 0.4 MG: 0.4 CAPSULE ORAL at 08:28

## 2021-10-25 LAB
ANION GAP SERPL CALCULATED.3IONS-SCNC: 7 MMOL/L (ref 4–13)
BASOPHILS # BLD AUTO: 0.04 THOUSANDS/ΜL (ref 0–0.1)
BASOPHILS NFR BLD AUTO: 0 % (ref 0–1)
BUN SERPL-MCNC: 81 MG/DL (ref 5–25)
CALCIUM SERPL-MCNC: 8.6 MG/DL (ref 8.3–10.1)
CHLORIDE SERPL-SCNC: 104 MMOL/L (ref 100–108)
CO2 SERPL-SCNC: 21 MMOL/L (ref 21–32)
CREAT SERPL-MCNC: 3.99 MG/DL (ref 0.6–1.3)
EOSINOPHIL # BLD AUTO: 0.45 THOUSAND/ΜL (ref 0–0.61)
EOSINOPHIL NFR BLD AUTO: 3 % (ref 0–6)
ERYTHROCYTE [DISTWIDTH] IN BLOOD BY AUTOMATED COUNT: 14.8 % (ref 11.6–15.1)
GFR SERPL CREATININE-BSD FRML MDRD: 14 ML/MIN/1.73SQ M
GLUCOSE SERPL-MCNC: 153 MG/DL (ref 65–140)
GLUCOSE SERPL-MCNC: 161 MG/DL (ref 65–140)
GLUCOSE SERPL-MCNC: 203 MG/DL (ref 65–140)
GLUCOSE SERPL-MCNC: 206 MG/DL (ref 65–140)
GLUCOSE SERPL-MCNC: 212 MG/DL (ref 65–140)
HCT VFR BLD AUTO: 27.6 % (ref 36.5–49.3)
HGB BLD-MCNC: 9.4 G/DL (ref 12–17)
IMM GRANULOCYTES # BLD AUTO: 0.07 THOUSAND/UL (ref 0–0.2)
IMM GRANULOCYTES NFR BLD AUTO: 1 % (ref 0–2)
LYMPHOCYTES # BLD AUTO: 0.91 THOUSANDS/ΜL (ref 0.6–4.47)
LYMPHOCYTES NFR BLD AUTO: 6 % (ref 14–44)
MAGNESIUM SERPL-MCNC: 2.8 MG/DL (ref 1.6–2.6)
MCH RBC QN AUTO: 31.8 PG (ref 26.8–34.3)
MCHC RBC AUTO-ENTMCNC: 34.1 G/DL (ref 31.4–37.4)
MCV RBC AUTO: 93 FL (ref 82–98)
MONOCYTES # BLD AUTO: 1.32 THOUSAND/ΜL (ref 0.17–1.22)
MONOCYTES NFR BLD AUTO: 9 % (ref 4–12)
NEUTROPHILS # BLD AUTO: 11.73 THOUSANDS/ΜL (ref 1.85–7.62)
NEUTS SEG NFR BLD AUTO: 81 % (ref 43–75)
NRBC BLD AUTO-RTO: 0 /100 WBCS
PHOSPHATE SERPL-MCNC: 4.1 MG/DL (ref 2.3–4.1)
PLATELET # BLD AUTO: 190 THOUSANDS/UL (ref 149–390)
PMV BLD AUTO: 9.6 FL (ref 8.9–12.7)
POTASSIUM SERPL-SCNC: 3.6 MMOL/L (ref 3.5–5.3)
RBC # BLD AUTO: 2.96 MILLION/UL (ref 3.88–5.62)
SODIUM SERPL-SCNC: 132 MMOL/L (ref 136–145)
WBC # BLD AUTO: 14.52 THOUSAND/UL (ref 4.31–10.16)

## 2021-10-25 PROCEDURE — 85025 COMPLETE CBC W/AUTO DIFF WBC: CPT | Performed by: COLON & RECTAL SURGERY

## 2021-10-25 PROCEDURE — 97116 GAIT TRAINING THERAPY: CPT

## 2021-10-25 PROCEDURE — 99232 SBSQ HOSP IP/OBS MODERATE 35: CPT | Performed by: INTERNAL MEDICINE

## 2021-10-25 PROCEDURE — 97530 THERAPEUTIC ACTIVITIES: CPT

## 2021-10-25 PROCEDURE — 82948 REAGENT STRIP/BLOOD GLUCOSE: CPT

## 2021-10-25 PROCEDURE — 0T9B70Z DRAINAGE OF BLADDER WITH DRAINAGE DEVICE, VIA NATURAL OR ARTIFICIAL OPENING: ICD-10-PCS | Performed by: INTERNAL MEDICINE

## 2021-10-25 PROCEDURE — 84100 ASSAY OF PHOSPHORUS: CPT | Performed by: COLON & RECTAL SURGERY

## 2021-10-25 PROCEDURE — 83735 ASSAY OF MAGNESIUM: CPT | Performed by: COLON & RECTAL SURGERY

## 2021-10-25 PROCEDURE — 80048 BASIC METABOLIC PNL TOTAL CA: CPT | Performed by: COLON & RECTAL SURGERY

## 2021-10-25 RX ORDER — POTASSIUM CHLORIDE 20 MEQ/1
40 TABLET, EXTENDED RELEASE ORAL ONCE
Status: COMPLETED | OUTPATIENT
Start: 2021-10-25 | End: 2021-10-25

## 2021-10-25 RX ORDER — TORSEMIDE 20 MG/1
40 TABLET ORAL DAILY
Status: DISCONTINUED | OUTPATIENT
Start: 2021-10-26 | End: 2021-10-27 | Stop reason: HOSPADM

## 2021-10-25 RX ADMIN — METOPROLOL TARTRATE 50 MG: 50 TABLET, FILM COATED ORAL at 13:57

## 2021-10-25 RX ADMIN — OXYCODONE HYDROCHLORIDE 10 MG: 10 TABLET ORAL at 15:42

## 2021-10-25 RX ADMIN — FAMOTIDINE 20 MG: 20 TABLET ORAL at 08:52

## 2021-10-25 RX ADMIN — INSULIN LISPRO 4 UNITS: 100 INJECTION, SOLUTION INTRAVENOUS; SUBCUTANEOUS at 21:21

## 2021-10-25 RX ADMIN — PRAVASTATIN SODIUM 40 MG: 40 TABLET ORAL at 21:19

## 2021-10-25 RX ADMIN — INSULIN LISPRO 4 UNITS: 100 INJECTION, SOLUTION INTRAVENOUS; SUBCUTANEOUS at 17:42

## 2021-10-25 RX ADMIN — METOPROLOL TARTRATE 50 MG: 50 TABLET, FILM COATED ORAL at 04:16

## 2021-10-25 RX ADMIN — TAMSULOSIN HYDROCHLORIDE 0.4 MG: 0.4 CAPSULE ORAL at 15:42

## 2021-10-25 RX ADMIN — OXYCODONE HYDROCHLORIDE 10 MG: 10 TABLET ORAL at 04:16

## 2021-10-25 RX ADMIN — HEPARIN SODIUM 5000 UNITS: 5000 INJECTION INTRAVENOUS; SUBCUTANEOUS at 21:19

## 2021-10-25 RX ADMIN — AMLODIPINE BESYLATE 10 MG: 10 TABLET ORAL at 08:53

## 2021-10-25 RX ADMIN — HEPARIN SODIUM 5000 UNITS: 5000 INJECTION INTRAVENOUS; SUBCUTANEOUS at 04:20

## 2021-10-25 RX ADMIN — HEPARIN SODIUM 5000 UNITS: 5000 INJECTION INTRAVENOUS; SUBCUTANEOUS at 13:57

## 2021-10-25 RX ADMIN — POTASSIUM CHLORIDE 40 MEQ: 1500 TABLET, EXTENDED RELEASE ORAL at 10:28

## 2021-10-25 RX ADMIN — INSULIN LISPRO 4 UNITS: 100 INJECTION, SOLUTION INTRAVENOUS; SUBCUTANEOUS at 08:53

## 2021-10-25 RX ADMIN — INSULIN LISPRO 2 UNITS: 100 INJECTION, SOLUTION INTRAVENOUS; SUBCUTANEOUS at 11:50

## 2021-10-26 ENCOUNTER — TELEPHONE (OUTPATIENT)
Dept: OTHER | Facility: HOSPITAL | Age: 66
End: 2021-10-26

## 2021-10-26 LAB
ANION GAP SERPL CALCULATED.3IONS-SCNC: 8 MMOL/L (ref 4–13)
BASOPHILS # BLD AUTO: 0.05 THOUSANDS/ΜL (ref 0–0.1)
BASOPHILS NFR BLD AUTO: 0 % (ref 0–1)
BUN SERPL-MCNC: 81 MG/DL (ref 5–25)
CALCIUM SERPL-MCNC: 8.5 MG/DL (ref 8.3–10.1)
CHLORIDE SERPL-SCNC: 103 MMOL/L (ref 100–108)
CO2 SERPL-SCNC: 20 MMOL/L (ref 21–32)
CREAT SERPL-MCNC: 3.86 MG/DL (ref 0.6–1.3)
EOSINOPHIL # BLD AUTO: 0.56 THOUSAND/ΜL (ref 0–0.61)
EOSINOPHIL NFR BLD AUTO: 4 % (ref 0–6)
ERYTHROCYTE [DISTWIDTH] IN BLOOD BY AUTOMATED COUNT: 14.8 % (ref 11.6–15.1)
GFR SERPL CREATININE-BSD FRML MDRD: 15 ML/MIN/1.73SQ M
GLUCOSE SERPL-MCNC: 143 MG/DL (ref 65–140)
GLUCOSE SERPL-MCNC: 162 MG/DL (ref 65–140)
GLUCOSE SERPL-MCNC: 168 MG/DL (ref 65–140)
GLUCOSE SERPL-MCNC: 182 MG/DL (ref 65–140)
GLUCOSE SERPL-MCNC: 219 MG/DL (ref 65–140)
HCT VFR BLD AUTO: 26.6 % (ref 36.5–49.3)
HGB BLD-MCNC: 8.8 G/DL (ref 12–17)
IMM GRANULOCYTES # BLD AUTO: 0.11 THOUSAND/UL (ref 0–0.2)
IMM GRANULOCYTES NFR BLD AUTO: 1 % (ref 0–2)
LYMPHOCYTES # BLD AUTO: 0.82 THOUSANDS/ΜL (ref 0.6–4.47)
LYMPHOCYTES NFR BLD AUTO: 6 % (ref 14–44)
MCH RBC QN AUTO: 31.7 PG (ref 26.8–34.3)
MCHC RBC AUTO-ENTMCNC: 33.1 G/DL (ref 31.4–37.4)
MCV RBC AUTO: 96 FL (ref 82–98)
MONOCYTES # BLD AUTO: 1.34 THOUSAND/ΜL (ref 0.17–1.22)
MONOCYTES NFR BLD AUTO: 11 % (ref 4–12)
NEUTROPHILS # BLD AUTO: 9.85 THOUSANDS/ΜL (ref 1.85–7.62)
NEUTS SEG NFR BLD AUTO: 78 % (ref 43–75)
NRBC BLD AUTO-RTO: 0 /100 WBCS
PLATELET # BLD AUTO: 206 THOUSANDS/UL (ref 149–390)
PMV BLD AUTO: 10.2 FL (ref 8.9–12.7)
POTASSIUM SERPL-SCNC: 3.5 MMOL/L (ref 3.5–5.3)
RBC # BLD AUTO: 2.78 MILLION/UL (ref 3.88–5.62)
SODIUM SERPL-SCNC: 131 MMOL/L (ref 136–145)
WBC # BLD AUTO: 12.73 THOUSAND/UL (ref 4.31–10.16)

## 2021-10-26 PROCEDURE — 99222 1ST HOSP IP/OBS MODERATE 55: CPT | Performed by: PHYSICIAN ASSISTANT

## 2021-10-26 PROCEDURE — 97535 SELF CARE MNGMENT TRAINING: CPT

## 2021-10-26 PROCEDURE — 85025 COMPLETE CBC W/AUTO DIFF WBC: CPT | Performed by: COLON & RECTAL SURGERY

## 2021-10-26 PROCEDURE — 99232 SBSQ HOSP IP/OBS MODERATE 35: CPT | Performed by: INTERNAL MEDICINE

## 2021-10-26 PROCEDURE — 80048 BASIC METABOLIC PNL TOTAL CA: CPT | Performed by: COLON & RECTAL SURGERY

## 2021-10-26 PROCEDURE — 82948 REAGENT STRIP/BLOOD GLUCOSE: CPT

## 2021-10-26 RX ORDER — POTASSIUM CHLORIDE 20 MEQ/1
40 TABLET, EXTENDED RELEASE ORAL ONCE
Status: COMPLETED | OUTPATIENT
Start: 2021-10-26 | End: 2021-10-26

## 2021-10-26 RX ADMIN — POTASSIUM CHLORIDE 40 MEQ: 1500 TABLET, EXTENDED RELEASE ORAL at 06:00

## 2021-10-26 RX ADMIN — TORSEMIDE 40 MG: 20 TABLET ORAL at 08:50

## 2021-10-26 RX ADMIN — INSULIN LISPRO 2 UNITS: 100 INJECTION, SOLUTION INTRAVENOUS; SUBCUTANEOUS at 12:17

## 2021-10-26 RX ADMIN — INSULIN LISPRO 2 UNITS: 100 INJECTION, SOLUTION INTRAVENOUS; SUBCUTANEOUS at 17:47

## 2021-10-26 RX ADMIN — FAMOTIDINE 20 MG: 20 TABLET ORAL at 08:44

## 2021-10-26 RX ADMIN — METOPROLOL TARTRATE 50 MG: 50 TABLET, FILM COATED ORAL at 13:38

## 2021-10-26 RX ADMIN — HEPARIN SODIUM 5000 UNITS: 5000 INJECTION INTRAVENOUS; SUBCUTANEOUS at 21:26

## 2021-10-26 RX ADMIN — OXYCODONE HYDROCHLORIDE 5 MG: 5 TABLET ORAL at 14:25

## 2021-10-26 RX ADMIN — PRAVASTATIN SODIUM 40 MG: 40 TABLET ORAL at 21:26

## 2021-10-26 RX ADMIN — HEPARIN SODIUM 5000 UNITS: 5000 INJECTION INTRAVENOUS; SUBCUTANEOUS at 13:23

## 2021-10-26 RX ADMIN — INSULIN LISPRO 4 UNITS: 100 INJECTION, SOLUTION INTRAVENOUS; SUBCUTANEOUS at 21:27

## 2021-10-26 RX ADMIN — HEPARIN SODIUM 5000 UNITS: 5000 INJECTION INTRAVENOUS; SUBCUTANEOUS at 06:00

## 2021-10-26 RX ADMIN — INSULIN LISPRO 2 UNITS: 100 INJECTION, SOLUTION INTRAVENOUS; SUBCUTANEOUS at 08:43

## 2021-10-26 RX ADMIN — TAMSULOSIN HYDROCHLORIDE 0.4 MG: 0.4 CAPSULE ORAL at 17:03

## 2021-10-26 RX ADMIN — AMLODIPINE BESYLATE 10 MG: 10 TABLET ORAL at 08:44

## 2021-10-26 RX ADMIN — METOPROLOL TARTRATE 50 MG: 50 TABLET, FILM COATED ORAL at 02:11

## 2021-10-27 VITALS
RESPIRATION RATE: 18 BRPM | WEIGHT: 207.23 LBS | DIASTOLIC BLOOD PRESSURE: 66 MMHG | SYSTOLIC BLOOD PRESSURE: 158 MMHG | HEIGHT: 70 IN | HEART RATE: 60 BPM | OXYGEN SATURATION: 96 % | TEMPERATURE: 98.2 F | BODY MASS INDEX: 29.67 KG/M2

## 2021-10-27 LAB
ANION GAP SERPL CALCULATED.3IONS-SCNC: 10 MMOL/L (ref 4–13)
BASOPHILS # BLD AUTO: 0.05 THOUSANDS/ΜL (ref 0–0.1)
BASOPHILS NFR BLD AUTO: 0 % (ref 0–1)
BUN SERPL-MCNC: 76 MG/DL (ref 5–25)
CALCIUM SERPL-MCNC: 8.8 MG/DL (ref 8.3–10.1)
CHLORIDE SERPL-SCNC: 106 MMOL/L (ref 100–108)
CO2 SERPL-SCNC: 17 MMOL/L (ref 21–32)
CREAT SERPL-MCNC: 3.52 MG/DL (ref 0.6–1.3)
EOSINOPHIL # BLD AUTO: 0.52 THOUSAND/ΜL (ref 0–0.61)
EOSINOPHIL NFR BLD AUTO: 4 % (ref 0–6)
ERYTHROCYTE [DISTWIDTH] IN BLOOD BY AUTOMATED COUNT: 14.7 % (ref 11.6–15.1)
GFR SERPL CREATININE-BSD FRML MDRD: 17 ML/MIN/1.73SQ M
GLUCOSE SERPL-MCNC: 123 MG/DL (ref 65–140)
GLUCOSE SERPL-MCNC: 139 MG/DL (ref 65–140)
GLUCOSE SERPL-MCNC: 230 MG/DL (ref 65–140)
HCT VFR BLD AUTO: 27.1 % (ref 36.5–49.3)
HGB BLD-MCNC: 9 G/DL (ref 12–17)
IMM GRANULOCYTES # BLD AUTO: 0.21 THOUSAND/UL (ref 0–0.2)
IMM GRANULOCYTES NFR BLD AUTO: 2 % (ref 0–2)
LYMPHOCYTES # BLD AUTO: 0.87 THOUSANDS/ΜL (ref 0.6–4.47)
LYMPHOCYTES NFR BLD AUTO: 7 % (ref 14–44)
MCH RBC QN AUTO: 31.9 PG (ref 26.8–34.3)
MCHC RBC AUTO-ENTMCNC: 33.2 G/DL (ref 31.4–37.4)
MCV RBC AUTO: 96 FL (ref 82–98)
MONOCYTES # BLD AUTO: 1.37 THOUSAND/ΜL (ref 0.17–1.22)
MONOCYTES NFR BLD AUTO: 10 % (ref 4–12)
NEUTROPHILS # BLD AUTO: 10.28 THOUSANDS/ΜL (ref 1.85–7.62)
NEUTS SEG NFR BLD AUTO: 77 % (ref 43–75)
NRBC BLD AUTO-RTO: 0 /100 WBCS
PLATELET # BLD AUTO: 234 THOUSANDS/UL (ref 149–390)
PMV BLD AUTO: 10.6 FL (ref 8.9–12.7)
POTASSIUM SERPL-SCNC: 3.6 MMOL/L (ref 3.5–5.3)
RBC # BLD AUTO: 2.82 MILLION/UL (ref 3.88–5.62)
SODIUM SERPL-SCNC: 133 MMOL/L (ref 136–145)
WBC # BLD AUTO: 13.3 THOUSAND/UL (ref 4.31–10.16)

## 2021-10-27 PROCEDURE — 99232 SBSQ HOSP IP/OBS MODERATE 35: CPT | Performed by: INTERNAL MEDICINE

## 2021-10-27 PROCEDURE — 85025 COMPLETE CBC W/AUTO DIFF WBC: CPT | Performed by: PHYSICIAN ASSISTANT

## 2021-10-27 PROCEDURE — 82948 REAGENT STRIP/BLOOD GLUCOSE: CPT

## 2021-10-27 PROCEDURE — 80048 BASIC METABOLIC PNL TOTAL CA: CPT | Performed by: PHYSICIAN ASSISTANT

## 2021-10-27 RX ORDER — OXYCODONE HYDROCHLORIDE 5 MG/1
5 TABLET ORAL EVERY 4 HOURS PRN
Qty: 20 TABLET | Refills: 0 | Status: SHIPPED | OUTPATIENT
Start: 2021-10-27 | End: 2021-11-01

## 2021-10-27 RX ORDER — ACETAMINOPHEN 325 MG/1
975 TABLET ORAL EVERY 6 HOURS PRN
Refills: 0 | Status: CANCELLED | COMMUNITY
Start: 2021-10-27

## 2021-10-27 RX ORDER — POTASSIUM CHLORIDE 20 MEQ/1
40 TABLET, EXTENDED RELEASE ORAL ONCE
Status: COMPLETED | OUTPATIENT
Start: 2021-10-27 | End: 2021-10-27

## 2021-10-27 RX ORDER — OXYCODONE HYDROCHLORIDE 5 MG/1
5 TABLET ORAL EVERY 4 HOURS PRN
Qty: 20 TABLET | Refills: 0 | Status: CANCELLED | OUTPATIENT
Start: 2021-10-27 | End: 2021-11-01

## 2021-10-27 RX ORDER — OXYCODONE HYDROCHLORIDE 5 MG/1
5 TABLET ORAL EVERY 4 HOURS PRN
Qty: 20 TABLET | Refills: 0 | OUTPATIENT
Start: 2021-10-27 | End: 2021-11-01

## 2021-10-27 RX ORDER — ACETAMINOPHEN 325 MG/1
975 TABLET ORAL EVERY 6 HOURS PRN
Refills: 0 | COMMUNITY
Start: 2021-10-27

## 2021-10-27 RX ORDER — ACETAMINOPHEN 325 MG/1
975 TABLET ORAL EVERY 6 HOURS PRN
Refills: 0 | COMMUNITY
Start: 2021-10-27 | End: 2022-01-28 | Stop reason: HOSPADM

## 2021-10-27 RX ADMIN — AMLODIPINE BESYLATE 10 MG: 10 TABLET ORAL at 08:48

## 2021-10-27 RX ADMIN — FAMOTIDINE 20 MG: 20 TABLET ORAL at 08:48

## 2021-10-27 RX ADMIN — TORSEMIDE 40 MG: 20 TABLET ORAL at 08:49

## 2021-10-27 RX ADMIN — INSULIN LISPRO 4 UNITS: 100 INJECTION, SOLUTION INTRAVENOUS; SUBCUTANEOUS at 12:53

## 2021-10-27 RX ADMIN — POTASSIUM CHLORIDE 40 MEQ: 1500 TABLET, EXTENDED RELEASE ORAL at 08:46

## 2021-10-27 RX ADMIN — METOPROLOL TARTRATE 50 MG: 50 TABLET, FILM COATED ORAL at 02:53

## 2021-10-27 RX ADMIN — HEPARIN SODIUM 5000 UNITS: 5000 INJECTION INTRAVENOUS; SUBCUTANEOUS at 05:35

## 2021-10-28 ENCOUNTER — TRANSITIONAL CARE MANAGEMENT (OUTPATIENT)
Dept: FAMILY MEDICINE CLINIC | Facility: CLINIC | Age: 66
End: 2021-10-28

## 2021-11-02 ENCOUNTER — OFFICE VISIT (OUTPATIENT)
Dept: UROLOGY | Facility: AMBULATORY SURGERY CENTER | Age: 66
End: 2021-11-02
Payer: MEDICARE

## 2021-11-02 VITALS
WEIGHT: 207 LBS | SYSTOLIC BLOOD PRESSURE: 120 MMHG | BODY MASS INDEX: 29.63 KG/M2 | HEART RATE: 88 BPM | DIASTOLIC BLOOD PRESSURE: 60 MMHG | HEIGHT: 70 IN

## 2021-11-02 DIAGNOSIS — N40.1 BENIGN PROSTATIC HYPERPLASIA WITH LOWER URINARY TRACT SYMPTOMS, SYMPTOM DETAILS UNSPECIFIED: Primary | ICD-10-CM

## 2021-11-02 LAB — POST-VOID RESIDUAL VOLUME, ML POC: 180 ML

## 2021-11-02 PROCEDURE — 99204 OFFICE O/P NEW MOD 45 MIN: CPT | Performed by: NURSE PRACTITIONER

## 2021-11-02 PROCEDURE — 51798 US URINE CAPACITY MEASURE: CPT | Performed by: NURSE PRACTITIONER

## 2021-11-10 ENCOUNTER — PATIENT OUTREACH (OUTPATIENT)
Dept: HEMATOLOGY ONCOLOGY | Facility: CLINIC | Age: 66
End: 2021-11-10

## 2021-11-10 ENCOUNTER — APPOINTMENT (OUTPATIENT)
Dept: LAB | Facility: CLINIC | Age: 66
End: 2021-11-10
Payer: MEDICARE

## 2021-11-10 DIAGNOSIS — C18.9 MALIGNANT NEOPLASM OF COLON, UNSPECIFIED PART OF COLON (HCC): Primary | ICD-10-CM

## 2021-11-10 DIAGNOSIS — E11.22 TYPE 2 DIABETES MELLITUS WITH STAGE 4 CHRONIC KIDNEY DISEASE, WITH LONG-TERM CURRENT USE OF INSULIN (HCC): ICD-10-CM

## 2021-11-10 DIAGNOSIS — Z79.4 TYPE 2 DIABETES MELLITUS WITH STAGE 4 CHRONIC KIDNEY DISEASE, WITH LONG-TERM CURRENT USE OF INSULIN (HCC): ICD-10-CM

## 2021-11-10 DIAGNOSIS — N18.4 TYPE 2 DIABETES MELLITUS WITH STAGE 4 CHRONIC KIDNEY DISEASE, WITH LONG-TERM CURRENT USE OF INSULIN (HCC): ICD-10-CM

## 2021-11-10 LAB
ALBUMIN SERPL BCP-MCNC: 2.7 G/DL (ref 3.5–5)
ALP SERPL-CCNC: 119 U/L (ref 46–116)
ALT SERPL W P-5'-P-CCNC: 35 U/L (ref 12–78)
ANION GAP SERPL CALCULATED.3IONS-SCNC: 13 MMOL/L (ref 4–13)
AST SERPL W P-5'-P-CCNC: 30 U/L (ref 5–45)
BACTERIA UR QL AUTO: ABNORMAL /HPF
BASOPHILS # BLD AUTO: 0.08 THOUSANDS/ΜL (ref 0–0.1)
BASOPHILS NFR BLD AUTO: 1 % (ref 0–1)
BILIRUB SERPL-MCNC: 0.56 MG/DL (ref 0.2–1)
BILIRUB UR QL STRIP: NEGATIVE
BUN SERPL-MCNC: 55 MG/DL (ref 5–25)
CALCIUM ALBUM COR SERPL-MCNC: 10.2 MG/DL (ref 8.3–10.1)
CALCIUM SERPL-MCNC: 9.2 MG/DL (ref 8.3–10.1)
CHLORIDE SERPL-SCNC: 102 MMOL/L (ref 100–108)
CLARITY UR: CLEAR
CO2 SERPL-SCNC: 23 MMOL/L (ref 21–32)
COLOR UR: YELLOW
CREAT SERPL-MCNC: 4 MG/DL (ref 0.6–1.3)
CREAT UR-MCNC: 96 MG/DL
EOSINOPHIL # BLD AUTO: 0.47 THOUSAND/ΜL (ref 0–0.61)
EOSINOPHIL NFR BLD AUTO: 4 % (ref 0–6)
ERYTHROCYTE [DISTWIDTH] IN BLOOD BY AUTOMATED COUNT: 13.9 % (ref 11.6–15.1)
GFR SERPL CREATININE-BSD FRML MDRD: 14 ML/MIN/1.73SQ M
GLUCOSE P FAST SERPL-MCNC: 115 MG/DL (ref 65–99)
GLUCOSE UR STRIP-MCNC: ABNORMAL MG/DL
HCT VFR BLD AUTO: 30.7 % (ref 36.5–49.3)
HGB BLD-MCNC: 10.2 G/DL (ref 12–17)
HGB UR QL STRIP.AUTO: ABNORMAL
HYALINE CASTS #/AREA URNS LPF: ABNORMAL /LPF
IMM GRANULOCYTES # BLD AUTO: 0.07 THOUSAND/UL (ref 0–0.2)
IMM GRANULOCYTES NFR BLD AUTO: 1 % (ref 0–2)
KETONES UR STRIP-MCNC: NEGATIVE MG/DL
LEUKOCYTE ESTERASE UR QL STRIP: NEGATIVE
LYMPHOCYTES # BLD AUTO: 0.9 THOUSANDS/ΜL (ref 0.6–4.47)
LYMPHOCYTES NFR BLD AUTO: 7 % (ref 14–44)
MCH RBC QN AUTO: 30.5 PG (ref 26.8–34.3)
MCHC RBC AUTO-ENTMCNC: 33.2 G/DL (ref 31.4–37.4)
MCV RBC AUTO: 92 FL (ref 82–98)
MONOCYTES # BLD AUTO: 1.25 THOUSAND/ΜL (ref 0.17–1.22)
MONOCYTES NFR BLD AUTO: 10 % (ref 4–12)
NEUTROPHILS # BLD AUTO: 9.77 THOUSANDS/ΜL (ref 1.85–7.62)
NEUTS SEG NFR BLD AUTO: 77 % (ref 43–75)
NITRITE UR QL STRIP: NEGATIVE
NON-SQ EPI CELLS URNS QL MICRO: ABNORMAL /HPF
NRBC BLD AUTO-RTO: 0 /100 WBCS
PH UR STRIP.AUTO: 6.5 [PH]
PHOSPHATE SERPL-MCNC: 3.8 MG/DL (ref 2.3–4.1)
PLATELET # BLD AUTO: 417 THOUSANDS/UL (ref 149–390)
PMV BLD AUTO: 9.6 FL (ref 8.9–12.7)
POTASSIUM SERPL-SCNC: 4 MMOL/L (ref 3.5–5.3)
PROT SERPL-MCNC: 7.5 G/DL (ref 6.4–8.2)
PROT UR STRIP-MCNC: ABNORMAL MG/DL
PROT UR-MCNC: 445 MG/DL
PROT/CREAT UR: 4.64 MG/G{CREAT} (ref 0–0.1)
PTH-INTACT SERPL-MCNC: 201.9 PG/ML (ref 18.4–80.1)
RBC # BLD AUTO: 3.34 MILLION/UL (ref 3.88–5.62)
RBC #/AREA URNS AUTO: ABNORMAL /HPF
SODIUM SERPL-SCNC: 138 MMOL/L (ref 136–145)
SP GR UR STRIP.AUTO: 1.01 (ref 1–1.03)
URATE SERPL-MCNC: 4.8 MG/DL (ref 4.2–8)
UROBILINOGEN UR QL STRIP.AUTO: 0.2 E.U./DL
WBC # BLD AUTO: 12.54 THOUSAND/UL (ref 4.31–10.16)
WBC #/AREA URNS AUTO: ABNORMAL /HPF

## 2021-11-10 PROCEDURE — 84100 ASSAY OF PHOSPHORUS: CPT

## 2021-11-10 PROCEDURE — 81001 URINALYSIS AUTO W/SCOPE: CPT

## 2021-11-10 PROCEDURE — 80053 COMPREHEN METABOLIC PANEL: CPT

## 2021-11-10 PROCEDURE — 85025 COMPLETE CBC W/AUTO DIFF WBC: CPT

## 2021-11-10 PROCEDURE — 84156 ASSAY OF PROTEIN URINE: CPT

## 2021-11-10 PROCEDURE — 84550 ASSAY OF BLOOD/URIC ACID: CPT

## 2021-11-10 PROCEDURE — 83970 ASSAY OF PARATHORMONE: CPT

## 2021-11-10 PROCEDURE — 36415 COLL VENOUS BLD VENIPUNCTURE: CPT

## 2021-11-10 PROCEDURE — 82570 ASSAY OF URINE CREATININE: CPT

## 2021-11-11 ENCOUNTER — TELEPHONE (OUTPATIENT)
Dept: HEMATOLOGY ONCOLOGY | Facility: CLINIC | Age: 66
End: 2021-11-11

## 2021-11-15 ENCOUNTER — TELEPHONE (OUTPATIENT)
Dept: NEPHROLOGY | Facility: HOSPITAL | Age: 66
End: 2021-11-15

## 2021-11-17 ENCOUNTER — TELEPHONE (OUTPATIENT)
Dept: OTHER | Facility: OTHER | Age: 66
End: 2021-11-17

## 2021-11-17 NOTE — PROGRESS NOTES
Established Patient Progress Note      Chief Complaint   Patient presents with    Follow-up     recent ER visit for hypoglycemia          History of Present Illness:   Anne Marie Bhandari is a 70 y o  male with a history of HTN, HLD type 2 diabetes without long term use of insulin  Reports complications of neuropathy and CKD  Last A1C 6 9  He presents today for ER follow up  Went to ER 3/05 for severe hypoglycemia  BG was 43, he did not require glucagon  Hypoglycemia treated and past sent home with BG of 128  Patient reports doctor at ER made him very scared about having another episode  He has been eating more carbs then usual including candy which he mostly avoids  His Glimipride and Xultophy were stopped  Patient only taking Metformin  BG have been increased since stopping Xultophy and Glimepiride  He denies polyuria, polydipsia, polyphagia, or blurred vision  Home glucose monitoring: are performed regularly    Home blood glucose readings:   Before breakfast: 170-200s  Before lunch: 120s   Before dinner: does not check   Bedtime: 110-300s    Current regimen: Metformin 1,000 mg BID  compliant all of the timedenies any side effects from current medications      Hypoglycemic episodes: Yes frequent   H/o of hypoglycemia causing hospitalization or Intervention such as glucagon injection or ambulance call Yes   Hypoglycemia symptoms: dizziness, headache and sweating   Treatment of hypoglycemia: orange juice     Last Eye Exam: Next Wednesday  Last Foot Exam: every 9 weeks     Has hypertension: Taking Amlodipine, Losartan, and Metoprolol   Has hyperlipidemia: Taking Simvastatin       Patient Active Problem List   Diagnosis    Bilateral leg edema    Diabetes mellitus with neuropathy [facility] [address] [facility]    Diabetic foot ulcer ([facility] [address] )    DM type 2, not at goal [facility])    Easy bruising    Eczema    Erectile dysfunction of non-organic origin    Gout    Hyperlipidemia    Nephropathy    Osteoarthritis    Peripheral arterial disease ([facility] [address] )  PVCs (premature ventricular contractions)    Rhinitis    Systolic murmur    Vertigo    Complete heart block (HCC)    Metabolic acidosis    Idiopathic hypotension    Other hyperlipidemia    Sepsis (HCC)    Paroxysmal atrial fibrillation (HCC)    Acute respiratory failure with hypoxia (HCC)    Persistent proteinuria    Volume overload    Urinary retention    Third degree heart block (HCC)    NICOLASA (obstructive sleep apnea)    Chronic diastolic (congestive) heart failure (HCC)    Type 2 diabetes mellitus with chronic kidney disease, without long-term current use of insulin (HCC)    Essential hypertension    Chronic kidney disease, stage III (moderate) ([facility])    Nonrheumatic aortic valve stenosis    Hypoglycemia      Past Medical History:   Diagnosis Date    Arthritis     OA    Bruise of both arms     forearms and both hands    Bruises easily     CHF (congestive heart failure) ([address]     Diabetes mellitus ([facility] [address] )     Diabetic foot ulcer ([facility] [address] )     Eczema     Erectile dysfunction     Gout     Hyperlipidemia     Hypertension     Murmur     Nephropathy     Osteoarthritis     PAD (peripheral artery disease) ([facility])     Seasonal allergies     Toe infection     bilat great toes    Vertigo     Walks frequently     Wears dentures     upper    Wears glasses       Past Surgical History:   Procedure Laterality Date    CARDIAC PACEMAKER PLACEMENT      CARPAL TUNNEL RELEASE Left     CARPAL TUNNEL RELEASE Right     CARPAL TUNNEL RELEASE      KNEE ARTHROSCOPY Left     KNEE ARTHROSCOPY Right     KNEE SURGERY      KS AMPUTATION TOE,I-P JT Bilateral 5/2/2017    Procedure: PARTIAL AMPUTATION RIGHT AND LEFT HALLUX ;  Surgeon: Fransisca Recinos DPM;  Location: [facility];  Service: Podiatry    KS AMPUTATION TOE,MT-P JT Left 7/25/2017    Procedure: 2ND TOE AMPUTATION;  Surgeon: Fransisca Recinos DPM;  Location[facility];  Service: Podiatry    SHOULDER ARTHROSCOPY Left     with screws,RTC    SHOULDER SURGERY      VASECTOMY        Family History   Problem Relation Age of Onset    Heart disease Father     No Known Problems Mother     Hypertension Father     Pulmonary embolism Father      Social History     Tobacco Use    Smoking status: Former Smoker     Packs/day: 1 00     Years: 30 00     Pack years: 30 00     Types: Cigarettes    Smokeless tobacco: Never Used    Tobacco comment: quit 10 years ago   Substance Use Topics    Alcohol use: No     Allergies   Allergen Reactions    Invokana [Canagliflozin]     Lamisil [Terbinafine] Rash    Lamisil [Terbinafine] Blisters     Wife states " His skin peeled from head to toe"    Other Swelling     Pomegranate - facial swelling, no swelling of tongue, esophagus  Adhesive tape        Latex Rash    Latex     Other Rash     palmegranate         Current Outpatient Medications:     allopurinol (ZYLOPRIM) 300 mg tablet, TAKE 1 TABLET (300 MG TOTAL) BY MOUTH EVERY MORNING, Disp: 90 tablet, Rfl: 2    amLODIPine (NORVASC) 10 mg tablet, Take 1 tablet (10 mg total) by mouth daily, Disp: 90 tablet, Rfl: 3    apixaban (ELIQUIS) 5 mg, Take 1 tablet (5 mg total) by mouth every 12 (twelve) hours, Disp: 60 tablet, Rfl: 5    Cholecalciferol (VITAMIN D-3) 1000 units CAPS, Take 1 capsule by mouth every morning  , Disp: , Rfl:     furosemide (LASIX) 40 mg tablet, Take 1 tablet (40 mg total) by mouth daily, Disp: 180 tablet, Rfl: 0    glucose blood (ONE TOUCH ULTRA TEST) test strip, 1 each by Other route 3 (three) times a day, Disp: 100 each, Rfl: 3    hydrOXYzine HCL (ATARAX) 10 mg tablet, Take 10 mg by mouth every 6 (six) hours as needed  , Disp: , Rfl:     losartan (COZAAR) 50 mg tablet, Take 1 5 tablets (75 mg total) by mouth daily, Disp: 180 tablet, Rfl: 3    metFORMIN (GLUCOPHAGE) 500 mg tablet, Take 2 tablets (1,000 mg total) by mouth 2 (two) times a day with meals [id number] /twice a day, Disp: 360 tablet, Rfl: 3    metoprolol tartrate (LOPRESSOR) 50 mg tablet, Take 1 tablet (50 mg total) by mouth every 12 (twelve) hours, Disp: 180 tablet, Rfl: 3    Multiple Vitamin (MULTIVITAMIN) tablet, Take 1 tablet by mouth daily, Disp: , Rfl:     omega-3-acid ethyl esters (LOVAZA) 1 g capsule, Take 2 capsules (2 g total) by mouth 2 (two) times a day, Disp: 360 capsule, Rfl: 3    simvastatin (ZOCOR) 20 mg tablet, Take 1 tablet (20 mg total) by mouth daily at bedtime, Disp: 90 tablet, Rfl: 3    tamsulosin (FLOMAX) 0 4 mg, Take 1 capsule (0 4 mg total) by mouth daily with dinner, Disp: 90 capsule, Rfl: 0    betamethasone valerate (VALISONE) 0 1 % cream, APPLY TO BACK,ARMS,CHEST,LEGS AND ABDOMEN TWICE A DAY, Disp: , Rfl: 3    glimepiride (AMARYL) 4 mg tablet, TAKE 1 TABLET BY MOUTH TWICE A DAY, Disp: 60 tablet, Rfl: 3    glucagon (GLUCAGON EMERGENCY) 1 MG injection, Inject 1 mg under the skin once as needed for low blood sugar for up to 1 dose, Disp: 1 kit, Rfl: 2    Insulin Degludec-Liraglutide (XULTOPHY) 100 units-3 6 mg/mL injection pen, Inject 30 Units under the skin daily for 90 days, Disp: 30 pen, Rfl: 0    predniSONE 10 mg tablet, 10 mg , Disp: , Rfl:     Review of Systems   Constitutional: Negative for activity change, appetite change and fatigue  HENT: Negative for sore throat, trouble swallowing and voice change  Eyes: Negative for visual disturbance  Respiratory: Negative for choking, chest tightness and shortness of breath  Cardiovascular: Negative for chest pain, palpitations and leg swelling  Gastrointestinal: Negative for abdominal pain, constipation and diarrhea  Endocrine: Negative for cold intolerance, heat intolerance, polydipsia, polyphagia and polyuria  Genitourinary: Negative for frequency  Musculoskeletal: Negative for arthralgias and myalgias  Skin: Negative for rash  Neurological: Negative for dizziness and syncope  Hematological: Negative for adenopathy  Psychiatric/Behavioral: Negative for sleep disturbance     All other systems [de-identified] : RACHEAL BROOKE is a 23 year old female has undergone extensive workup and has been deemed an appropriate candidate for Laparoscopic Sleeve Gastrectomy. At this preop appointment, I discussed with the patient the risks, benefits, and alternatives to bariatric surgery. I specifically discussed the risks of DVT/PE, pneumonia, dehydration, injury to the stomach and surrounding organs, and anesthesia risks including cardiac and pulmonary complications. I discussed pre and post-operative diet protocols, to begin with a two week partial liquid diet. I discussed the importance of exercise in both the pre and post-operative time periods. The patient had ample opportunity to ask questions and all questions were answered. \par \par -  instructed to start liquid high protein diet 2 weeks prior the surgery.  reviewed and are negative  Physical Exam:  Body mass index is 32 83 kg/m²  /58   Pulse 86   Ht 5' 10" (1 778 m)   Wt 104 kg (228 lb 12 8 oz)   BMI 32 83 kg/m²    Wt Readings from Last 3 Encounters:   03/08/19 104 kg (228 lb 12 8 oz)   03/05/19 100 kg (220 lb 6 4 oz)   02/22/19 99 8 kg (220 lb)       Physical Exam   Constitutional: He is oriented to person, place, and time  He appears well-developed and well-nourished  No distress  HENT:   Head: Normocephalic and atraumatic  Mouth/Throat: Oropharynx is clear and moist    Eyes: Pupils are equal, round, and reactive to light  Conjunctivae and EOM are normal    Neck: Normal range of motion  Neck supple  No thyromegaly present  Cardiovascular: Normal rate, regular rhythm and normal heart sounds  Pulses are no weak pulses  No murmur heard  Pulses:       Dorsalis pedis pulses are 2+ on the right side, and 2+ on the left side  Pulmonary/Chest: Effort normal and breath sounds normal  No respiratory distress  He has no wheezes  He has no rales  Abdominal: Soft  Bowel sounds are normal  He exhibits no distension  There is no tenderness  Musculoskeletal: Normal range of motion  He exhibits no edema  Feet:   Right Foot:   Skin Integrity: Negative for ulcer, skin breakdown, erythema, warmth, callus or dry skin  Left Foot:   Skin Integrity: Negative for ulcer, skin breakdown, erythema, warmth, callus or dry skin  Lymphadenopathy:     He has no cervical adenopathy  Neurological: He is alert and oriented to person, place, and time  Skin: Skin is warm and dry  Psychiatric: He has a normal mood and affect  Vitals reviewed  Patient's shoes and socks removed  Right Foot/Ankle   Right Foot Inspection  Skin Exam: skin normal skin not intact, no dry skin, no warmth, no callus, no erythema, no maceration, no abnormal color, no pre-ulcer, no ulcer and no callus                            Sensory       Monofilament testing: intact  Vascular    The right DP pulse is 2+  Left Foot/Ankle  Left Foot Inspection  Skin Exam: skin normalskin not intact, no dry skin, no warmth, no erythema, no maceration, normal color, no pre-ulcer, no ulcer and no callus                                         Sensory       Monofilament: intact  Vascular    The left DP pulse is 2+  Assign Risk Category:  No deformity present; No loss of protective sensation; No weak pulses       Risk: 0      Labs:     Lab Results   Component Value Date    HGBA1C 6 9 (H) 03/07/2019       Lab Results   Component Value Date     (L) 04/10/2017    K 5 1 03/07/2019     03/07/2019    CO2 22 03/07/2019    AGAP 8 03/07/2019    BUN 37 (H) 03/07/2019    CREATININE 1 33 (H) 03/07/2019    GLUC 48 (L) 03/05/2019    GLUF 168 (H) 03/07/2019    CALCIUM 9 0 03/07/2019    AST 23 03/07/2019    ALT 25 03/07/2019    ALKPHOS 100 03/07/2019    PROT 6 9 04/10/2017    TP 7 2 03/07/2019    BILITOT 0 7 04/10/2017    TBILI 0 62 03/07/2019    EGFR 56 03/07/2019         Lab Results   Component Value Date    CHOL 161 12/12/2015    HDL 33 (L) 03/07/2019    TRIG 111 03/07/2019       Lab Results   Component Value Date    DYJ8ZEHQURDZ 2 140 03/07/2019    HWG8UALUNRRE 3 496 11/01/2018    APO1KROKTJML 2 410 06/28/2018     Lab Results   Component Value Date    FREET4 1 22 03/07/2019       Impression & Plan:    Problem List Items Addressed This Visit        Endocrine    Diabetes mellitus with neuropathy ([facility] [address] ) - Primary     Patient with recent severe hypoglycemic episode  Xultophy and Glimpride stopped  Patient requesting to keep upcoming appointment with Dr Araceli Staley on 3/20 to reassess BG off these medications as it has only been three days since ER visit  Plan to keep upcoming appointment  Patient to continue Metformin for now  Discussed with patient Metformin does not increase risk for hypoglycemia and he can resume his normal diet   Stressed for next two weeks to check BG 4x per day and if he is feeling hypoglycemic  Reviewed s/s of hypoglycemia and proper treatment  Provided script for glucagon for emergencies  He will carry meter and glucose tablets with him at all times  He will notify office of BG less than 70  Relevant Medications    glucagon (GLUCAGON EMERGENCY) 1 MG injection    Other Relevant Orders    Hemoglobin A1C    Comprehensive metabolic panel    T4, free    TSH, 3rd generation    Microalbumin / creatinine urine ratio    Hypoglycemia    Relevant Medications    glucagon (GLUCAGON EMERGENCY) 1 MG injection       Cardiovascular and Mediastinum    Essential hypertension     BP stable, continue current regimen         Relevant Orders    Comprehensive metabolic panel       Other    Hyperlipidemia     Stable, continue statin          Relevant Orders    Lipid Panel with Direct LDL reflex          Orders Placed This Encounter   Procedures    Hemoglobin A1C     Standing Status:   Future     Standing Expiration Date:   6/8/2019    Comprehensive metabolic panel     This is a patient instruction: Patient fasting for 8 hours or longer recommended  Standing Status:   Future     Standing Expiration Date:   3/8/2020    Lipid Panel with Direct LDL reflex     This is a patient instruction: This test requires patient fasting for 10-12 hours or longer  Drinking of black coffee or black tea is acceptable  Standing Status:   Future     Standing Expiration Date:   3/8/2020    T4, free     Standing Status:   Future     Standing Expiration Date:   6/8/2019    TSH, 3rd generation     This is a patient instruction: This test is non-fasting  Please drink two glasses of water morning of bloodwork          Standing Status:   Future     Standing Expiration Date:   6/8/2019    Microalbumin / creatinine urine ratio     Standing Status:   Future     Standing Expiration Date:   3/8/2020       Patient Instructions     Hypoglycemia in a Person with Diabetes   WHAT YOU NEED TO KNOW:   Hypoglycemia is a serious condition that happens when your blood glucose (sugar) level drops too low  The blood sugar level is usually too high in a person with diabetes, but the level can also drop too low  It is important to follow your diabetes management plan to keep your blood sugar level steady  DISCHARGE INSTRUCTIONS:   Call 911 for any of the following:   · You feel you are going to pass out  · You have a seizure or pass out  · You have trouble thinking clearly  Seek care immediately if:  · Your blood sugar is less than 50 mg/dL and does not respond to treatment  Contact your healthcare provider if:   · You have had symptoms of low blood sugar several times  · You have questions about the amount of insulin or diabetes medicine you are taking  · You have questions or concerns about your condition or care  Medicines:   · Insulin or diabetes medicine  help to keep your blood sugar under control  · Glucagon  may be needed if you have severe hypoglycemia  · Take your medicine as directed  Contact your healthcare provider if you think your medicine is not helping or if you have side effects  Tell him or her if you are allergic to any medicine  Keep a list of the medicines, vitamins, and herbs you take  Include the amounts, and when and why you take them  Bring the list or the pill bottles to follow-up visits  Carry your medicine list with you in case of an emergency  Follow up with your healthcare provider or specialist as directed: You may need dose changes to your insulin or oral diabetes medicine if you have hypoglycemia  Write down your questions so you remember to ask them during your visits  Manage hypoglycemia:   · Check your blood sugar level right away if you have symptoms of hypoglycemia  Hypoglycemia is usually 70 mg/dL or below  Ask your healthcare provider what blood sugar level is too low for you      · If your blood sugar level is too low, eat or drink 15 grams of fast-acting carbohydrate  Examples of this amount of fast-acting carbohydrate are 4 ounces (½ cup) of fruit juice or 4 ounces of regular soda  Other examples are 2 tablespoons of raisins or 3 to 4 glucose tablets  Check your blood sugar level 15 minutes later  If the level is still low (less than 100 mg/dL), have another 15 grams of carbohydrate  When the level returns to 100 mg/dL, eat a snack or meal that contains carbohydrates  This will help prevent another drop in blood sugar  Always carefully follow your healthcare provider's instructions on how to treat low blood sugar levels  · Always carry a source of fast-acting carbohydrate  If you have symptoms of hypoglycemia and you do not have a blood glucose meter, have a source of fast-acting carbohydrate anyway  Avoid carbohydrate foods that are high in fat  The fat content may make it take longer to increase your blood sugar level  Ask your healthcare provider if you should carry a glucagon kit  Glucagon is a medicine that is injected when you develop severe hypoglycemia and become unconscious  Check the expiration date every month and replace it before it expires  · Teach others how to help you if you have symptoms of hypoglycemia  Tell them about the symptoms of hypoglycemia  Ask them to give you a source of fast-acting carbohydrate if you cannot get it yourself  Ask them to give you a glucagon injection if you have symptoms of hypoglycemia and you become unconscious or have a seizure  Ask them to call 911   This is an emergency  Tell them never to try to make you swallow anything if you faint or have a seizure  · Wear medical alert jewelry  or carry a card that says you have diabetes  Ask where to get these items  Prevent hypoglycemia:   · Take diabetes medicine as directed  Take your medicine at the right time and in the right amount  Your healthcare provider may change your blood sugar goals if you get hypoglycemia often       · Eat regular meals and snacks  Talk to your dietitian or healthcare provider about a meal plan that is right for you  Do not skip meals  · Check your blood sugar level as directed  Ask your healthcare provider what your blood sugar levels should be before and after you eat  Ask when and how often to check your blood sugar level  You may need to check at least 3 times each day  Record your blood sugar level results and take the record with you when you see your healthcare provider  Your provider may use the record to make changes to your medicine, food, or exercise schedules  · Check your blood sugar level before you exercise  Exercise can decrease your blood sugar level  If your blood sugar level is less than 100 mg/dL, have a carbohydrate snack  Examples are 4 to 6 crackers, ½ banana, 8 ounces (1 cup) of nonfat or 1% milk, or 4 ounces (½ cup) of juice  If you will exercise for more than 1 hour, you may need to check your blood sugar level every 30 minutes  Your healthcare provider may also recommend that you check your blood sugar level after exercise  · Be aware of how alcohol affects your blood sugar level  Alcohol can cause your blood sugar level to drop for up to 12 hours after drinking  Ask your healthcare provider if alcohol is safe for you  If you drink alcohol, always have a snack or meal at the same time  Women should limit alcohol to 1 drink a day  Men should limit alcohol to 2 drinks a day  A drink of alcohol is 12 ounces of beer, 5 ounces of wine, or 1½ ounces of liquor  © 2017 [facility] Information is for End User's use only and may not be sold, redistributed or otherwise used for commercial purposes  All illustrations and images included in CareNotes® are the copyrighted property of [employer]  or [employer]uster Giraldo  The above information is an  only  It is not intended as medical advice for individual conditions or treatments   Talk to your doctor, nurse or pharmacist before following any medical regimen to see if it is safe and effective for you  Discussed with the patient and all questioned fully answered  He will call me if any problems arise         Counseled patient on diagnostic results, prognosis, risk and benefit of treatment options, instruction for management, importance of treatment compliance, Risk  factor reduction and impressions      Chloe Deluca [address]

## 2021-11-24 ENCOUNTER — HOSPITAL ENCOUNTER (OUTPATIENT)
Dept: CT IMAGING | Facility: HOSPITAL | Age: 66
Discharge: HOME | End: 2021-11-24
Attending: COLON & RECTAL SURGERY
Payer: MEDICARE

## 2021-11-24 DIAGNOSIS — C18.9 MALIGNANT NEOPLASM OF COLON, UNSPECIFIED PART OF COLON (HCC): ICD-10-CM

## 2021-11-24 PROCEDURE — 74177 CT ABD & PELVIS W/CONTRAST: CPT

## 2021-11-24 PROCEDURE — G1004 CDSM NDSC: HCPCS

## 2021-11-24 PROCEDURE — 71260 CT THORAX DX C+: CPT

## 2021-11-24 RX ADMIN — IOHEXOL 100 ML: 350 INJECTION, SOLUTION INTRAVENOUS at 16:38

## 2021-11-26 ENCOUNTER — APPOINTMENT (OUTPATIENT)
Dept: LAB | Facility: CLINIC | Age: 66
DRG: 673 | End: 2021-11-26
Payer: MEDICARE

## 2021-11-26 ENCOUNTER — TELEPHONE (OUTPATIENT)
Dept: OTHER | Facility: HOSPITAL | Age: 66
End: 2021-11-26

## 2021-11-26 DIAGNOSIS — N17.9 AKI (ACUTE KIDNEY INJURY) (HCC): Primary | ICD-10-CM

## 2021-11-26 DIAGNOSIS — N18.4 CKD (CHRONIC KIDNEY DISEASE) STAGE 4, GFR 15-29 ML/MIN (HCC): ICD-10-CM

## 2021-11-26 DIAGNOSIS — I50.32 CHRONIC DIASTOLIC CONGESTIVE HEART FAILURE (HCC): ICD-10-CM

## 2021-11-26 LAB
ALBUMIN SERPL BCP-MCNC: 3.1 G/DL (ref 3.5–5)
ALP SERPL-CCNC: 98 U/L (ref 46–116)
ALT SERPL W P-5'-P-CCNC: 18 U/L (ref 12–78)
ANION GAP SERPL CALCULATED.3IONS-SCNC: 8 MMOL/L (ref 4–13)
AST SERPL W P-5'-P-CCNC: 19 U/L (ref 5–45)
BASOPHILS # BLD AUTO: 0.09 THOUSANDS/ΜL (ref 0–0.1)
BASOPHILS NFR BLD AUTO: 1 % (ref 0–1)
BILIRUB SERPL-MCNC: 0.77 MG/DL (ref 0.2–1)
BUN SERPL-MCNC: 61 MG/DL (ref 5–25)
CALCIUM ALBUM COR SERPL-MCNC: 9.9 MG/DL (ref 8.3–10.1)
CALCIUM SERPL-MCNC: 9.2 MG/DL (ref 8.3–10.1)
CHLORIDE SERPL-SCNC: 102 MMOL/L (ref 100–108)
CO2 SERPL-SCNC: 22 MMOL/L (ref 21–32)
CREAT SERPL-MCNC: 5.81 MG/DL (ref 0.6–1.3)
EOSINOPHIL # BLD AUTO: 0.39 THOUSAND/ΜL (ref 0–0.61)
EOSINOPHIL NFR BLD AUTO: 3 % (ref 0–6)
ERYTHROCYTE [DISTWIDTH] IN BLOOD BY AUTOMATED COUNT: 14.4 % (ref 11.6–15.1)
GFR SERPL CREATININE-BSD FRML MDRD: 9 ML/MIN/1.73SQ M
GLUCOSE P FAST SERPL-MCNC: 108 MG/DL (ref 65–99)
HCT VFR BLD AUTO: 30.7 % (ref 36.5–49.3)
HGB BLD-MCNC: 10 G/DL (ref 12–17)
IMM GRANULOCYTES # BLD AUTO: 0.05 THOUSAND/UL (ref 0–0.2)
IMM GRANULOCYTES NFR BLD AUTO: 0 % (ref 0–2)
LYMPHOCYTES # BLD AUTO: 0.94 THOUSANDS/ΜL (ref 0.6–4.47)
LYMPHOCYTES NFR BLD AUTO: 8 % (ref 14–44)
MAGNESIUM SERPL-MCNC: 2.2 MG/DL (ref 1.6–2.6)
MCH RBC QN AUTO: 30.5 PG (ref 26.8–34.3)
MCHC RBC AUTO-ENTMCNC: 32.6 G/DL (ref 31.4–37.4)
MCV RBC AUTO: 94 FL (ref 82–98)
MONOCYTES # BLD AUTO: 0.74 THOUSAND/ΜL (ref 0.17–1.22)
MONOCYTES NFR BLD AUTO: 6 % (ref 4–12)
NEUTROPHILS # BLD AUTO: 9.94 THOUSANDS/ΜL (ref 1.85–7.62)
NEUTS SEG NFR BLD AUTO: 82 % (ref 43–75)
NRBC BLD AUTO-RTO: 0 /100 WBCS
PHOSPHATE SERPL-MCNC: 5 MG/DL (ref 2.3–4.1)
PLATELET # BLD AUTO: 247 THOUSANDS/UL (ref 149–390)
PMV BLD AUTO: 9.9 FL (ref 8.9–12.7)
POTASSIUM SERPL-SCNC: 4.5 MMOL/L (ref 3.5–5.3)
PROT SERPL-MCNC: 7.3 G/DL (ref 6.4–8.2)
PTH-INTACT SERPL-MCNC: 108 PG/ML (ref 18.4–80.1)
RBC # BLD AUTO: 3.28 MILLION/UL (ref 3.88–5.62)
SODIUM SERPL-SCNC: 132 MMOL/L (ref 136–145)
URATE SERPL-MCNC: 4.7 MG/DL (ref 4.2–8)
WBC # BLD AUTO: 12.15 THOUSAND/UL (ref 4.31–10.16)

## 2021-11-26 PROCEDURE — 84100 ASSAY OF PHOSPHORUS: CPT

## 2021-11-26 PROCEDURE — 85025 COMPLETE CBC W/AUTO DIFF WBC: CPT

## 2021-11-26 PROCEDURE — 84550 ASSAY OF BLOOD/URIC ACID: CPT

## 2021-11-26 PROCEDURE — 80053 COMPREHEN METABOLIC PANEL: CPT

## 2021-11-26 PROCEDURE — 36415 COLL VENOUS BLD VENIPUNCTURE: CPT

## 2021-11-26 PROCEDURE — 83970 ASSAY OF PARATHORMONE: CPT

## 2021-11-26 PROCEDURE — 83735 ASSAY OF MAGNESIUM: CPT

## 2021-11-27 ENCOUNTER — APPOINTMENT (OUTPATIENT)
Dept: LAB | Facility: CLINIC | Age: 66
DRG: 673 | End: 2021-11-27
Payer: MEDICARE

## 2021-11-27 DIAGNOSIS — N17.9 AKI (ACUTE KIDNEY INJURY) (HCC): ICD-10-CM

## 2021-11-27 LAB
ANION GAP SERPL CALCULATED.3IONS-SCNC: 10 MMOL/L (ref 4–13)
BUN SERPL-MCNC: 74 MG/DL (ref 5–25)
CALCIUM SERPL-MCNC: 9.7 MG/DL (ref 8.3–10.1)
CHLORIDE SERPL-SCNC: 104 MMOL/L (ref 100–108)
CO2 SERPL-SCNC: 20 MMOL/L (ref 21–32)
CREAT SERPL-MCNC: 7.18 MG/DL (ref 0.6–1.3)
GFR SERPL CREATININE-BSD FRML MDRD: 7 ML/MIN/1.73SQ M
GLUCOSE P FAST SERPL-MCNC: 111 MG/DL (ref 65–99)
POTASSIUM SERPL-SCNC: 4.6 MMOL/L (ref 3.5–5.3)
SODIUM SERPL-SCNC: 134 MMOL/L (ref 136–145)

## 2021-11-27 PROCEDURE — 80048 BASIC METABOLIC PNL TOTAL CA: CPT

## 2021-11-27 PROCEDURE — 36415 COLL VENOUS BLD VENIPUNCTURE: CPT

## 2021-11-28 ENCOUNTER — APPOINTMENT (EMERGENCY)
Dept: RADIOLOGY | Facility: HOSPITAL | Age: 66
DRG: 673 | End: 2021-11-28
Payer: MEDICARE

## 2021-11-28 ENCOUNTER — HOSPITAL ENCOUNTER (INPATIENT)
Facility: HOSPITAL | Age: 66
LOS: 3 days | Discharge: HOME | DRG: 673 | End: 2021-12-01
Attending: EMERGENCY MEDICINE | Admitting: INTERNAL MEDICINE
Payer: MEDICARE

## 2021-11-28 DIAGNOSIS — I48.0 PAF (PAROXYSMAL ATRIAL FIBRILLATION) (HCC): ICD-10-CM

## 2021-11-28 DIAGNOSIS — L97.509 DIABETIC FOOT ULCER (HCC): ICD-10-CM

## 2021-11-28 DIAGNOSIS — B33.8 RSV INFECTION: ICD-10-CM

## 2021-11-28 DIAGNOSIS — N17.9 ACUTE KIDNEY INJURY (HCC): ICD-10-CM

## 2021-11-28 DIAGNOSIS — M86.9 OSTEOMYELITIS OF LEFT FOOT, UNSPECIFIED TYPE (HCC): ICD-10-CM

## 2021-11-28 DIAGNOSIS — N17.9 AKI (ACUTE KIDNEY INJURY) (HCC): Primary | ICD-10-CM

## 2021-11-28 DIAGNOSIS — E11.40 TYPE 2 DIABETES MELLITUS WITH DIABETIC NEUROPATHY, WITHOUT LONG-TERM CURRENT USE OF INSULIN (HCC): ICD-10-CM

## 2021-11-28 DIAGNOSIS — R06.00 DYSPNEA: ICD-10-CM

## 2021-11-28 DIAGNOSIS — E11.621 DIABETIC FOOT ULCER (HCC): ICD-10-CM

## 2021-11-28 PROBLEM — J12.1 RSV (RESPIRATORY SYNCYTIAL VIRUS PNEUMONIA): Status: ACTIVE | Noted: 2021-11-28

## 2021-11-28 LAB
2HR DELTA HS TROPONIN: 1 NG/L
4HR DELTA HS TROPONIN: 1 NG/L
ALBUMIN SERPL BCP-MCNC: 3.2 G/DL (ref 3.5–5)
ALP SERPL-CCNC: 102 U/L (ref 46–116)
ALT SERPL W P-5'-P-CCNC: 19 U/L (ref 12–78)
ANION GAP SERPL CALCULATED.3IONS-SCNC: 13 MMOL/L (ref 4–13)
APTT PPP: 38 SECONDS (ref 23–37)
AST SERPL W P-5'-P-CCNC: 17 U/L (ref 5–45)
BASOPHILS # BLD AUTO: 0.05 THOUSANDS/ΜL (ref 0–0.1)
BASOPHILS NFR BLD AUTO: 1 % (ref 0–1)
BILIRUB SERPL-MCNC: 0.67 MG/DL (ref 0.2–1)
BUN SERPL-MCNC: 76 MG/DL (ref 5–25)
CALCIUM ALBUM COR SERPL-MCNC: 9.9 MG/DL (ref 8.3–10.1)
CALCIUM SERPL-MCNC: 9.3 MG/DL (ref 8.3–10.1)
CARDIAC TROPONIN I PNL SERPL HS: 15 NG/L
CARDIAC TROPONIN I PNL SERPL HS: 16 NG/L
CARDIAC TROPONIN I PNL SERPL HS: 16 NG/L
CHLORIDE SERPL-SCNC: 101 MMOL/L (ref 100–108)
CO2 SERPL-SCNC: 22 MMOL/L (ref 21–32)
CREAT SERPL-MCNC: 6.65 MG/DL (ref 0.6–1.3)
EOSINOPHIL # BLD AUTO: 0.5 THOUSAND/ΜL (ref 0–0.61)
EOSINOPHIL NFR BLD AUTO: 5 % (ref 0–6)
ERYTHROCYTE [DISTWIDTH] IN BLOOD BY AUTOMATED COUNT: 14.3 % (ref 11.6–15.1)
FLUAV RNA RESP QL NAA+PROBE: NEGATIVE
FLUBV RNA RESP QL NAA+PROBE: NEGATIVE
GFR SERPL CREATININE-BSD FRML MDRD: 8 ML/MIN/1.73SQ M
GLUCOSE SERPL-MCNC: 171 MG/DL (ref 65–140)
HCT VFR BLD AUTO: 29.3 % (ref 36.5–49.3)
HGB BLD-MCNC: 9.8 G/DL (ref 12–17)
IMM GRANULOCYTES # BLD AUTO: 0.03 THOUSAND/UL (ref 0–0.2)
IMM GRANULOCYTES NFR BLD AUTO: 0 % (ref 0–2)
INR PPP: 1.71 (ref 0.84–1.19)
LYMPHOCYTES # BLD AUTO: 0.55 THOUSANDS/ΜL (ref 0.6–4.47)
LYMPHOCYTES NFR BLD AUTO: 6 % (ref 14–44)
MCH RBC QN AUTO: 31 PG (ref 26.8–34.3)
MCHC RBC AUTO-ENTMCNC: 33.4 G/DL (ref 31.4–37.4)
MCV RBC AUTO: 93 FL (ref 82–98)
MONOCYTES # BLD AUTO: 0.76 THOUSAND/ΜL (ref 0.17–1.22)
MONOCYTES NFR BLD AUTO: 8 % (ref 4–12)
NEUTROPHILS # BLD AUTO: 7.87 THOUSANDS/ΜL (ref 1.85–7.62)
NEUTS SEG NFR BLD AUTO: 80 % (ref 43–75)
NRBC BLD AUTO-RTO: 0 /100 WBCS
NT-PROBNP SERPL-MCNC: ABNORMAL PG/ML
PLATELET # BLD AUTO: 193 THOUSANDS/UL (ref 149–390)
PMV BLD AUTO: 9.6 FL (ref 8.9–12.7)
POTASSIUM SERPL-SCNC: 4.9 MMOL/L (ref 3.5–5.3)
PROT SERPL-MCNC: 7.2 G/DL (ref 6.4–8.2)
PROTHROMBIN TIME: 19.5 SECONDS (ref 11.6–14.5)
RBC # BLD AUTO: 3.16 MILLION/UL (ref 3.88–5.62)
RSV RNA RESP QL NAA+PROBE: POSITIVE
SARS-COV-2 RNA RESP QL NAA+PROBE: NEGATIVE
SODIUM SERPL-SCNC: 136 MMOL/L (ref 136–145)
WBC # BLD AUTO: 9.76 THOUSAND/UL (ref 4.31–10.16)

## 2021-11-28 PROCEDURE — 80053 COMPREHEN METABOLIC PANEL: CPT | Performed by: PHYSICIAN ASSISTANT

## 2021-11-28 PROCEDURE — 99285 EMERGENCY DEPT VISIT HI MDM: CPT | Performed by: PHYSICIAN ASSISTANT

## 2021-11-28 PROCEDURE — 99223 1ST HOSP IP/OBS HIGH 75: CPT | Performed by: INTERNAL MEDICINE

## 2021-11-28 PROCEDURE — 93005 ELECTROCARDIOGRAM TRACING: CPT

## 2021-11-28 PROCEDURE — 83880 ASSAY OF NATRIURETIC PEPTIDE: CPT | Performed by: PHYSICIAN ASSISTANT

## 2021-11-28 PROCEDURE — 36415 COLL VENOUS BLD VENIPUNCTURE: CPT | Performed by: PHYSICIAN ASSISTANT

## 2021-11-28 PROCEDURE — 85025 COMPLETE CBC W/AUTO DIFF WBC: CPT | Performed by: PHYSICIAN ASSISTANT

## 2021-11-28 PROCEDURE — 71045 X-RAY EXAM CHEST 1 VIEW: CPT

## 2021-11-28 PROCEDURE — 99285 EMERGENCY DEPT VISIT HI MDM: CPT

## 2021-11-28 PROCEDURE — 0241U HB NFCT DS VIR RESP RNA 4 TRGT: CPT | Performed by: PHYSICIAN ASSISTANT

## 2021-11-28 PROCEDURE — 85610 PROTHROMBIN TIME: CPT | Performed by: PHYSICIAN ASSISTANT

## 2021-11-28 PROCEDURE — 94002 VENT MGMT INPAT INIT DAY: CPT

## 2021-11-28 PROCEDURE — 84484 ASSAY OF TROPONIN QUANT: CPT | Performed by: PHYSICIAN ASSISTANT

## 2021-11-28 PROCEDURE — 81001 URINALYSIS AUTO W/SCOPE: CPT | Performed by: INTERNAL MEDICINE

## 2021-11-28 PROCEDURE — 85730 THROMBOPLASTIN TIME PARTIAL: CPT | Performed by: PHYSICIAN ASSISTANT

## 2021-11-28 RX ORDER — ONDANSETRON 2 MG/ML
4 INJECTION INTRAMUSCULAR; INTRAVENOUS EVERY 6 HOURS PRN
Status: DISCONTINUED | OUTPATIENT
Start: 2021-11-28 | End: 2021-12-01 | Stop reason: HOSPADM

## 2021-11-28 RX ORDER — CHLORAL HYDRATE 500 MG
1000 CAPSULE ORAL 2 TIMES DAILY
Status: DISCONTINUED | OUTPATIENT
Start: 2021-11-28 | End: 2021-12-01 | Stop reason: HOSPADM

## 2021-11-28 RX ORDER — TAMSULOSIN HYDROCHLORIDE 0.4 MG/1
0.4 CAPSULE ORAL
Status: DISCONTINUED | OUTPATIENT
Start: 2021-11-29 | End: 2021-12-01 | Stop reason: HOSPADM

## 2021-11-28 RX ORDER — PRAVASTATIN SODIUM 40 MG
40 TABLET ORAL
Status: DISCONTINUED | OUTPATIENT
Start: 2021-11-29 | End: 2021-12-01 | Stop reason: HOSPADM

## 2021-11-28 RX ORDER — SODIUM BICARBONATE 650 MG/1
650 TABLET ORAL
Status: DISCONTINUED | OUTPATIENT
Start: 2021-11-28 | End: 2021-11-30

## 2021-11-28 RX ORDER — FAMOTIDINE 20 MG/1
10 TABLET, FILM COATED ORAL DAILY
Status: DISCONTINUED | OUTPATIENT
Start: 2021-11-29 | End: 2021-12-01 | Stop reason: HOSPADM

## 2021-11-28 RX ORDER — DOCUSATE SODIUM 100 MG/1
100 CAPSULE, LIQUID FILLED ORAL 2 TIMES DAILY
Status: DISCONTINUED | OUTPATIENT
Start: 2021-11-28 | End: 2021-12-01 | Stop reason: HOSPADM

## 2021-11-28 RX ORDER — SODIUM CHLORIDE, SODIUM GLUCONATE, SODIUM ACETATE, POTASSIUM CHLORIDE, MAGNESIUM CHLORIDE, SODIUM PHOSPHATE, DIBASIC, AND POTASSIUM PHOSPHATE .53; .5; .37; .037; .03; .012; .00082 G/100ML; G/100ML; G/100ML; G/100ML; G/100ML; G/100ML; G/100ML
75 INJECTION, SOLUTION INTRAVENOUS CONTINUOUS
Status: DISPENSED | OUTPATIENT
Start: 2021-11-28 | End: 2021-11-29

## 2021-11-28 RX ORDER — ACETAMINOPHEN 325 MG/1
975 TABLET ORAL EVERY 6 HOURS PRN
Status: DISCONTINUED | OUTPATIENT
Start: 2021-11-28 | End: 2021-12-01 | Stop reason: HOSPADM

## 2021-11-28 RX ORDER — POLYETHYLENE GLYCOL 3350 17 G/17G
17 POWDER, FOR SOLUTION ORAL DAILY
Status: DISCONTINUED | OUTPATIENT
Start: 2021-11-29 | End: 2021-12-01 | Stop reason: HOSPADM

## 2021-11-28 RX ORDER — ALLOPURINOL 100 MG/1
100 TABLET ORAL EVERY MORNING
Status: DISCONTINUED | OUTPATIENT
Start: 2021-11-29 | End: 2021-12-01 | Stop reason: HOSPADM

## 2021-11-28 RX ORDER — SENNOSIDES 8.6 MG
1 TABLET ORAL DAILY
Status: DISCONTINUED | OUTPATIENT
Start: 2021-11-29 | End: 2021-12-01 | Stop reason: HOSPADM

## 2021-11-28 RX ORDER — METOPROLOL TARTRATE 50 MG/1
50 TABLET, FILM COATED ORAL EVERY 12 HOURS
Status: DISCONTINUED | OUTPATIENT
Start: 2021-11-28 | End: 2021-11-29

## 2021-11-28 RX ORDER — AMLODIPINE BESYLATE 10 MG/1
10 TABLET ORAL DAILY
Status: DISCONTINUED | OUTPATIENT
Start: 2021-11-29 | End: 2021-11-29

## 2021-11-28 RX ADMIN — SODIUM BICARBONATE 650 MG TABLET 650 MG: at 22:23

## 2021-11-28 RX ADMIN — APIXABAN 2.5 MG: 2.5 TABLET, FILM COATED ORAL at 22:24

## 2021-11-28 RX ADMIN — METOPROLOL TARTRATE 50 MG: 50 TABLET, FILM COATED ORAL at 22:24

## 2021-11-28 RX ADMIN — DOCUSATE SODIUM 100 MG: 100 CAPSULE ORAL at 22:24

## 2021-11-28 RX ADMIN — SODIUM CHLORIDE, SODIUM GLUCONATE, SODIUM ACETATE, POTASSIUM CHLORIDE, MAGNESIUM CHLORIDE, SODIUM PHOSPHATE, DIBASIC, AND POTASSIUM PHOSPHATE 75 ML/HR: .53; .5; .37; .037; .03; .012; .00082 INJECTION, SOLUTION INTRAVENOUS at 22:24

## 2021-11-28 RX ADMIN — OMEGA-3 FATTY ACIDS CAP 1000 MG 1000 MG: 1000 CAP at 22:24

## 2021-11-29 LAB
ANION GAP SERPL CALCULATED.3IONS-SCNC: 13 MMOL/L (ref 4–13)
ATRIAL RATE: 250 BPM
BACTERIA UR QL AUTO: ABNORMAL /HPF
BASOPHILS # BLD AUTO: 0.05 THOUSANDS/ΜL (ref 0–0.1)
BASOPHILS NFR BLD AUTO: 1 % (ref 0–1)
BILIRUB UR QL STRIP: NEGATIVE
BUN SERPL-MCNC: 67 MG/DL (ref 5–25)
CALCIUM SERPL-MCNC: 9.2 MG/DL (ref 8.3–10.1)
CHLORIDE SERPL-SCNC: 105 MMOL/L (ref 100–108)
CLARITY UR: CLEAR
CO2 SERPL-SCNC: 21 MMOL/L (ref 21–32)
COLOR UR: ABNORMAL
CREAT SERPL-MCNC: 5.89 MG/DL (ref 0.6–1.3)
EOSINOPHIL # BLD AUTO: 0.4 THOUSAND/ΜL (ref 0–0.61)
EOSINOPHIL NFR BLD AUTO: 5 % (ref 0–6)
ERYTHROCYTE [DISTWIDTH] IN BLOOD BY AUTOMATED COUNT: 14.5 % (ref 11.6–15.1)
EST. AVERAGE GLUCOSE BLD GHB EST-MCNC: 126 MG/DL
GFR SERPL CREATININE-BSD FRML MDRD: 9 ML/MIN/1.73SQ M
GLUCOSE SERPL-MCNC: 126 MG/DL (ref 65–140)
GLUCOSE SERPL-MCNC: 134 MG/DL (ref 65–140)
GLUCOSE SERPL-MCNC: 188 MG/DL (ref 65–140)
GLUCOSE SERPL-MCNC: 90 MG/DL (ref 65–140)
GLUCOSE SERPL-MCNC: 98 MG/DL (ref 65–140)
GLUCOSE UR STRIP-MCNC: ABNORMAL MG/DL
HBA1C MFR BLD: 6 %
HCT VFR BLD AUTO: 28.3 % (ref 36.5–49.3)
HGB BLD-MCNC: 9.2 G/DL (ref 12–17)
HGB UR QL STRIP.AUTO: ABNORMAL
IMM GRANULOCYTES # BLD AUTO: 0.01 THOUSAND/UL (ref 0–0.2)
IMM GRANULOCYTES NFR BLD AUTO: 0 % (ref 0–2)
KETONES UR STRIP-MCNC: NEGATIVE MG/DL
LEUKOCYTE ESTERASE UR QL STRIP: NEGATIVE
LYMPHOCYTES # BLD AUTO: 0.59 THOUSANDS/ΜL (ref 0.6–4.47)
LYMPHOCYTES NFR BLD AUTO: 8 % (ref 14–44)
MCH RBC QN AUTO: 29.8 PG (ref 26.8–34.3)
MCHC RBC AUTO-ENTMCNC: 32.5 G/DL (ref 31.4–37.4)
MCV RBC AUTO: 92 FL (ref 82–98)
MONOCYTES # BLD AUTO: 0.61 THOUSAND/ΜL (ref 0.17–1.22)
MONOCYTES NFR BLD AUTO: 8 % (ref 4–12)
NEUTROPHILS # BLD AUTO: 5.81 THOUSANDS/ΜL (ref 1.85–7.62)
NEUTS SEG NFR BLD AUTO: 78 % (ref 43–75)
NITRITE UR QL STRIP: NEGATIVE
NON-SQ EPI CELLS URNS QL MICRO: ABNORMAL /HPF
NRBC BLD AUTO-RTO: 0 /100 WBCS
OTHER STN SPEC: ABNORMAL
P AXIS: 244 DEGREES
PH UR STRIP.AUTO: 7.5 [PH]
PLATELET # BLD AUTO: 187 THOUSANDS/UL (ref 149–390)
PMV BLD AUTO: 9.3 FL (ref 8.9–12.7)
POTASSIUM SERPL-SCNC: 4.4 MMOL/L (ref 3.5–5.3)
PROT UR STRIP-MCNC: ABNORMAL MG/DL
QRS AXIS: -86 DEGREES
QRSD INTERVAL: 194 MS
QT INTERVAL: 494 MS
QTC INTERVAL: 497 MS
RBC # BLD AUTO: 3.09 MILLION/UL (ref 3.88–5.62)
RBC #/AREA URNS AUTO: ABNORMAL /HPF
SODIUM SERPL-SCNC: 139 MMOL/L (ref 136–145)
SP GR UR STRIP.AUTO: 1.02 (ref 1–1.03)
T WAVE AXIS: 82 DEGREES
UROBILINOGEN UR QL STRIP.AUTO: 0.2 E.U./DL
VENTRICULAR RATE: 61 BPM
WBC # BLD AUTO: 7.47 THOUSAND/UL (ref 4.31–10.16)
WBC #/AREA URNS AUTO: ABNORMAL /HPF

## 2021-11-29 PROCEDURE — 99223 1ST HOSP IP/OBS HIGH 75: CPT | Performed by: INTERNAL MEDICINE

## 2021-11-29 PROCEDURE — 80048 BASIC METABOLIC PNL TOTAL CA: CPT | Performed by: INTERNAL MEDICINE

## 2021-11-29 PROCEDURE — 85025 COMPLETE CBC W/AUTO DIFF WBC: CPT | Performed by: INTERNAL MEDICINE

## 2021-11-29 PROCEDURE — 82948 REAGENT STRIP/BLOOD GLUCOSE: CPT

## 2021-11-29 PROCEDURE — 94660 CPAP INITIATION&MGMT: CPT

## 2021-11-29 PROCEDURE — 83036 HEMOGLOBIN GLYCOSYLATED A1C: CPT | Performed by: INTERNAL MEDICINE

## 2021-11-29 PROCEDURE — 93010 ELECTROCARDIOGRAM REPORT: CPT | Performed by: INTERNAL MEDICINE

## 2021-11-29 PROCEDURE — 99232 SBSQ HOSP IP/OBS MODERATE 35: CPT | Performed by: PHYSICIAN ASSISTANT

## 2021-11-29 PROCEDURE — 36415 COLL VENOUS BLD VENIPUNCTURE: CPT | Performed by: INTERNAL MEDICINE

## 2021-11-29 RX ORDER — SODIUM CHLORIDE, SODIUM GLUCONATE, SODIUM ACETATE, POTASSIUM CHLORIDE, MAGNESIUM CHLORIDE, SODIUM PHOSPHATE, DIBASIC, AND POTASSIUM PHOSPHATE .53; .5; .37; .037; .03; .012; .00082 G/100ML; G/100ML; G/100ML; G/100ML; G/100ML; G/100ML; G/100ML
50 INJECTION, SOLUTION INTRAVENOUS CONTINUOUS
Status: DISCONTINUED | OUTPATIENT
Start: 2021-11-29 | End: 2021-11-30

## 2021-11-29 RX ORDER — AMLODIPINE BESYLATE 10 MG/1
10 TABLET ORAL DAILY
Status: DISCONTINUED | OUTPATIENT
Start: 2021-11-30 | End: 2021-12-01 | Stop reason: HOSPADM

## 2021-11-29 RX ORDER — METOPROLOL TARTRATE 50 MG/1
50 TABLET, FILM COATED ORAL EVERY 12 HOURS
Status: DISCONTINUED | OUTPATIENT
Start: 2021-11-29 | End: 2021-12-01 | Stop reason: HOSPADM

## 2021-11-29 RX ADMIN — OMEGA-3 FATTY ACIDS CAP 1000 MG 1000 MG: 1000 CAP at 17:00

## 2021-11-29 RX ADMIN — INSULIN LISPRO 1 UNITS: 100 INJECTION, SOLUTION INTRAVENOUS; SUBCUTANEOUS at 16:54

## 2021-11-29 RX ADMIN — SODIUM CHLORIDE, SODIUM GLUCONATE, SODIUM ACETATE, POTASSIUM CHLORIDE, MAGNESIUM CHLORIDE, SODIUM PHOSPHATE, DIBASIC, AND POTASSIUM PHOSPHATE 50 ML/HR: .53; .5; .37; .037; .03; .012; .00082 INJECTION, SOLUTION INTRAVENOUS at 15:14

## 2021-11-29 RX ADMIN — OMEGA-3 FATTY ACIDS CAP 1000 MG 1000 MG: 1000 CAP at 08:25

## 2021-11-29 RX ADMIN — ALLOPURINOL 100 MG: 100 TABLET ORAL at 08:19

## 2021-11-29 RX ADMIN — DOCUSATE SODIUM 100 MG: 100 CAPSULE ORAL at 17:00

## 2021-11-29 RX ADMIN — APIXABAN 2.5 MG: 2.5 TABLET, FILM COATED ORAL at 08:18

## 2021-11-29 RX ADMIN — METOPROLOL TARTRATE 50 MG: 50 TABLET, FILM COATED ORAL at 20:19

## 2021-11-29 RX ADMIN — PRAVASTATIN SODIUM 40 MG: 40 TABLET ORAL at 16:55

## 2021-11-29 RX ADMIN — FAMOTIDINE 10 MG: 20 TABLET ORAL at 08:18

## 2021-11-29 RX ADMIN — SODIUM BICARBONATE 650 MG TABLET 650 MG: at 16:55

## 2021-11-29 RX ADMIN — TAMSULOSIN HYDROCHLORIDE 0.4 MG: 0.4 CAPSULE ORAL at 16:55

## 2021-11-29 RX ADMIN — APIXABAN 2.5 MG: 2.5 TABLET, FILM COATED ORAL at 20:19

## 2021-11-29 RX ADMIN — SODIUM BICARBONATE 650 MG TABLET 650 MG: at 08:25

## 2021-11-29 RX ADMIN — METOPROLOL TARTRATE 50 MG: 50 TABLET, FILM COATED ORAL at 08:18

## 2021-11-29 RX ADMIN — AMLODIPINE BESYLATE 10 MG: 10 TABLET ORAL at 08:19

## 2021-11-30 PROBLEM — L97.529 DIABETIC ULCER OF LEFT FOOT (HCC): Status: ACTIVE | Noted: 2017-03-18

## 2021-11-30 LAB
ANION GAP SERPL CALCULATED.3IONS-SCNC: 14 MMOL/L (ref 4–13)
ATRIAL RATE: 241 BPM
ATRIAL RATE: 250 BPM
BUN SERPL-MCNC: 51 MG/DL (ref 5–25)
CALCIUM SERPL-MCNC: 9.1 MG/DL (ref 8.3–10.1)
CHLORIDE SERPL-SCNC: 104 MMOL/L (ref 100–108)
CO2 SERPL-SCNC: 20 MMOL/L (ref 21–32)
CREAT SERPL-MCNC: 4.76 MG/DL (ref 0.6–1.3)
GFR SERPL CREATININE-BSD FRML MDRD: 12 ML/MIN/1.73SQ M
GLUCOSE SERPL-MCNC: 129 MG/DL (ref 65–140)
GLUCOSE SERPL-MCNC: 155 MG/DL (ref 65–140)
GLUCOSE SERPL-MCNC: 157 MG/DL (ref 65–140)
GLUCOSE SERPL-MCNC: 96 MG/DL (ref 65–140)
GLUCOSE SERPL-MCNC: 98 MG/DL (ref 65–140)
P AXIS: 0 DEGREES
P AXIS: 270 DEGREES
PHOSPHATE SERPL-MCNC: 4.3 MG/DL (ref 2.3–4.1)
POTASSIUM SERPL-SCNC: 4 MMOL/L (ref 3.5–5.3)
QRS AXIS: -74 DEGREES
QRS AXIS: -84 DEGREES
QRSD INTERVAL: 194 MS
QRSD INTERVAL: 208 MS
QT INTERVAL: 494 MS
QT INTERVAL: 504 MS
QTC INTERVAL: 505 MS
QTC INTERVAL: 524 MS
SODIUM SERPL-SCNC: 138 MMOL/L (ref 136–145)
T WAVE AXIS: 85 DEGREES
T WAVE AXIS: 88 DEGREES
VENTRICULAR RATE: 63 BPM
VENTRICULAR RATE: 65 BPM

## 2021-11-30 PROCEDURE — 0JBR0ZZ EXCISION OF LEFT FOOT SUBCUTANEOUS TISSUE AND FASCIA, OPEN APPROACH: ICD-10-PCS | Performed by: PODIATRIST

## 2021-11-30 PROCEDURE — 84100 ASSAY OF PHOSPHORUS: CPT | Performed by: INTERNAL MEDICINE

## 2021-11-30 PROCEDURE — 93010 ELECTROCARDIOGRAM REPORT: CPT | Performed by: INTERNAL MEDICINE

## 2021-11-30 PROCEDURE — 99232 SBSQ HOSP IP/OBS MODERATE 35: CPT | Performed by: INTERNAL MEDICINE

## 2021-11-30 PROCEDURE — 0HBMXZZ EXCISION OF RIGHT FOOT SKIN, EXTERNAL APPROACH: ICD-10-PCS | Performed by: PODIATRIST

## 2021-11-30 PROCEDURE — 94660 CPAP INITIATION&MGMT: CPT

## 2021-11-30 PROCEDURE — 99232 SBSQ HOSP IP/OBS MODERATE 35: CPT | Performed by: PHYSICIAN ASSISTANT

## 2021-11-30 PROCEDURE — 97162 PT EVAL MOD COMPLEX 30 MIN: CPT

## 2021-11-30 PROCEDURE — 97165 OT EVAL LOW COMPLEX 30 MIN: CPT

## 2021-11-30 PROCEDURE — 80048 BASIC METABOLIC PNL TOTAL CA: CPT | Performed by: PHYSICIAN ASSISTANT

## 2021-11-30 PROCEDURE — 82948 REAGENT STRIP/BLOOD GLUCOSE: CPT

## 2021-11-30 RX ORDER — SODIUM BICARBONATE 650 MG/1
1300 TABLET ORAL
Status: DISCONTINUED | OUTPATIENT
Start: 2021-11-30 | End: 2021-12-01 | Stop reason: HOSPADM

## 2021-11-30 RX ORDER — GINSENG 100 MG
1 CAPSULE ORAL DAILY
Status: DISCONTINUED | OUTPATIENT
Start: 2021-11-30 | End: 2021-12-01 | Stop reason: HOSPADM

## 2021-11-30 RX ORDER — BACITRACIN, NEOMYCIN, POLYMYXIN B 400; 3.5; 5 [USP'U]/G; MG/G; [USP'U]/G
1 OINTMENT TOPICAL 2 TIMES DAILY
Status: DISCONTINUED | OUTPATIENT
Start: 2021-11-30 | End: 2021-12-01 | Stop reason: HOSPADM

## 2021-11-30 RX ADMIN — ALLOPURINOL 100 MG: 100 TABLET ORAL at 08:29

## 2021-11-30 RX ADMIN — BACITRACIN ZINC 1 SMALL APPLICATION: 500 OINTMENT TOPICAL at 13:00

## 2021-11-30 RX ADMIN — INSULIN LISPRO 1 UNITS: 100 INJECTION, SOLUTION INTRAVENOUS; SUBCUTANEOUS at 12:59

## 2021-11-30 RX ADMIN — PRAVASTATIN SODIUM 40 MG: 40 TABLET ORAL at 16:41

## 2021-11-30 RX ADMIN — SODIUM BICARBONATE 650 MG TABLET 650 MG: at 08:29

## 2021-11-30 RX ADMIN — APIXABAN 2.5 MG: 2.5 TABLET, FILM COATED ORAL at 08:29

## 2021-11-30 RX ADMIN — METOPROLOL TARTRATE 50 MG: 50 TABLET, FILM COATED ORAL at 20:45

## 2021-11-30 RX ADMIN — INSULIN LISPRO 1 UNITS: 100 INJECTION, SOLUTION INTRAVENOUS; SUBCUTANEOUS at 17:27

## 2021-11-30 RX ADMIN — METOPROLOL TARTRATE 50 MG: 50 TABLET, FILM COATED ORAL at 08:29

## 2021-11-30 RX ADMIN — OMEGA-3 FATTY ACIDS CAP 1000 MG 1000 MG: 1000 CAP at 17:27

## 2021-11-30 RX ADMIN — AMLODIPINE BESYLATE 10 MG: 10 TABLET ORAL at 08:29

## 2021-11-30 RX ADMIN — SODIUM BICARBONATE 650 MG TABLET 1300 MG: at 17:26

## 2021-11-30 RX ADMIN — OMEGA-3 FATTY ACIDS CAP 1000 MG 1000 MG: 1000 CAP at 08:29

## 2021-11-30 RX ADMIN — FAMOTIDINE 10 MG: 20 TABLET ORAL at 08:29

## 2021-11-30 RX ADMIN — TAMSULOSIN HYDROCHLORIDE 0.4 MG: 0.4 CAPSULE ORAL at 16:41

## 2021-11-30 RX ADMIN — APIXABAN 2.5 MG: 2.5 TABLET, FILM COATED ORAL at 20:44

## 2021-12-01 VITALS
TEMPERATURE: 98.4 F | HEIGHT: 70 IN | HEART RATE: 60 BPM | RESPIRATION RATE: 18 BRPM | DIASTOLIC BLOOD PRESSURE: 68 MMHG | OXYGEN SATURATION: 92 % | SYSTOLIC BLOOD PRESSURE: 146 MMHG | BODY MASS INDEX: 28.46 KG/M2 | WEIGHT: 198.8 LBS

## 2021-12-01 LAB
ANION GAP SERPL CALCULATED.3IONS-SCNC: 13 MMOL/L (ref 4–13)
BUN SERPL-MCNC: 45 MG/DL (ref 5–25)
CALCIUM SERPL-MCNC: 8.9 MG/DL (ref 8.3–10.1)
CHLORIDE SERPL-SCNC: 106 MMOL/L (ref 100–108)
CO2 SERPL-SCNC: 20 MMOL/L (ref 21–32)
CREAT SERPL-MCNC: 4.08 MG/DL (ref 0.6–1.3)
GFR SERPL CREATININE-BSD FRML MDRD: 14 ML/MIN/1.73SQ M
GLUCOSE SERPL-MCNC: 105 MG/DL (ref 65–140)
GLUCOSE SERPL-MCNC: 178 MG/DL (ref 65–140)
GLUCOSE SERPL-MCNC: 98 MG/DL (ref 65–140)
PHOSPHATE SERPL-MCNC: 3.6 MG/DL (ref 2.3–4.1)
POTASSIUM SERPL-SCNC: 4.1 MMOL/L (ref 3.5–5.3)
SODIUM SERPL-SCNC: 139 MMOL/L (ref 136–145)

## 2021-12-01 PROCEDURE — 80048 BASIC METABOLIC PNL TOTAL CA: CPT | Performed by: INTERNAL MEDICINE

## 2021-12-01 PROCEDURE — 99232 SBSQ HOSP IP/OBS MODERATE 35: CPT | Performed by: INTERNAL MEDICINE

## 2021-12-01 PROCEDURE — 99239 HOSP IP/OBS DSCHRG MGMT >30: CPT | Performed by: PHYSICIAN ASSISTANT

## 2021-12-01 PROCEDURE — 82948 REAGENT STRIP/BLOOD GLUCOSE: CPT

## 2021-12-01 PROCEDURE — 84100 ASSAY OF PHOSPHORUS: CPT | Performed by: INTERNAL MEDICINE

## 2021-12-01 RX ORDER — SODIUM BICARBONATE 650 MG/1
1300 TABLET ORAL
Qty: 180 TABLET | Refills: 0 | Status: SHIPPED | OUTPATIENT
Start: 2021-12-01 | End: 2021-12-28

## 2021-12-01 RX ADMIN — OMEGA-3 FATTY ACIDS CAP 1000 MG 1000 MG: 1000 CAP at 09:58

## 2021-12-01 RX ADMIN — ALLOPURINOL 100 MG: 100 TABLET ORAL at 10:01

## 2021-12-01 RX ADMIN — APIXABAN 2.5 MG: 2.5 TABLET, FILM COATED ORAL at 10:00

## 2021-12-01 RX ADMIN — METOPROLOL TARTRATE 50 MG: 50 TABLET, FILM COATED ORAL at 09:58

## 2021-12-01 RX ADMIN — SODIUM BICARBONATE 650 MG TABLET 1300 MG: at 10:01

## 2021-12-01 RX ADMIN — INSULIN LISPRO 1 UNITS: 100 INJECTION, SOLUTION INTRAVENOUS; SUBCUTANEOUS at 12:07

## 2021-12-01 RX ADMIN — AMLODIPINE BESYLATE 10 MG: 10 TABLET ORAL at 10:00

## 2021-12-01 RX ADMIN — FAMOTIDINE 10 MG: 20 TABLET ORAL at 09:58

## 2021-12-02 ENCOUNTER — PATIENT OUTREACH (OUTPATIENT)
Dept: HEMATOLOGY ONCOLOGY | Facility: CLINIC | Age: 66
End: 2021-12-02

## 2021-12-03 DIAGNOSIS — N18.4 TYPE 2 DIABETES MELLITUS WITH STAGE 4 CHRONIC KIDNEY DISEASE, WITH LONG-TERM CURRENT USE OF INSULIN (HCC): Primary | ICD-10-CM

## 2021-12-03 DIAGNOSIS — E11.22 TYPE 2 DIABETES MELLITUS WITH STAGE 4 CHRONIC KIDNEY DISEASE, WITH LONG-TERM CURRENT USE OF INSULIN (HCC): Primary | ICD-10-CM

## 2021-12-03 DIAGNOSIS — Z79.4 TYPE 2 DIABETES MELLITUS WITH STAGE 4 CHRONIC KIDNEY DISEASE, WITH LONG-TERM CURRENT USE OF INSULIN (HCC): Primary | ICD-10-CM

## 2021-12-03 RX ORDER — (INSULIN DEGLUDEC AND LIRAGLUTIDE) 100; 3.6 [IU]/ML; MG/ML
INJECTION, SOLUTION SUBCUTANEOUS
Qty: 18 ML | Refills: 1 | Status: SHIPPED | OUTPATIENT
Start: 2021-12-03 | End: 2022-05-20 | Stop reason: SDUPTHER

## 2021-12-04 DIAGNOSIS — M10.9 GOUT, UNSPECIFIED CAUSE, UNSPECIFIED CHRONICITY, UNSPECIFIED SITE: ICD-10-CM

## 2021-12-06 ENCOUNTER — APPOINTMENT (OUTPATIENT)
Dept: LAB | Facility: CLINIC | Age: 66
End: 2021-12-06
Payer: MEDICARE

## 2021-12-06 DIAGNOSIS — N17.9 AKI (ACUTE KIDNEY INJURY) (HCC): ICD-10-CM

## 2021-12-06 LAB
ANION GAP SERPL CALCULATED.3IONS-SCNC: 8 MMOL/L (ref 4–13)
BUN SERPL-MCNC: 25 MG/DL (ref 5–25)
CALCIUM SERPL-MCNC: 9.4 MG/DL (ref 8.3–10.1)
CHLORIDE SERPL-SCNC: 112 MMOL/L (ref 100–108)
CO2 SERPL-SCNC: 20 MMOL/L (ref 21–32)
CREAT SERPL-MCNC: 2.72 MG/DL (ref 0.6–1.3)
GFR SERPL CREATININE-BSD FRML MDRD: 23 ML/MIN/1.73SQ M
GLUCOSE SERPL-MCNC: 106 MG/DL (ref 65–140)
POTASSIUM SERPL-SCNC: 4 MMOL/L (ref 3.5–5.3)
SODIUM SERPL-SCNC: 140 MMOL/L (ref 136–145)

## 2021-12-06 PROCEDURE — 36415 COLL VENOUS BLD VENIPUNCTURE: CPT

## 2021-12-06 PROCEDURE — 80048 BASIC METABOLIC PNL TOTAL CA: CPT

## 2021-12-07 ENCOUNTER — OFFICE VISIT (OUTPATIENT)
Dept: CARDIOLOGY CLINIC | Facility: CLINIC | Age: 66
End: 2021-12-07
Payer: MEDICARE

## 2021-12-07 VITALS
WEIGHT: 202 LBS | SYSTOLIC BLOOD PRESSURE: 126 MMHG | HEART RATE: 84 BPM | RESPIRATION RATE: 16 BRPM | BODY MASS INDEX: 28.92 KG/M2 | DIASTOLIC BLOOD PRESSURE: 68 MMHG | HEIGHT: 70 IN

## 2021-12-07 DIAGNOSIS — I49.5 SSS (SICK SINUS SYNDROME) (HCC): ICD-10-CM

## 2021-12-07 DIAGNOSIS — I48.0 PAF (PAROXYSMAL ATRIAL FIBRILLATION) (HCC): ICD-10-CM

## 2021-12-07 DIAGNOSIS — E78.2 MIXED HYPERLIPIDEMIA: ICD-10-CM

## 2021-12-07 DIAGNOSIS — I35.0 NONRHEUMATIC AORTIC VALVE STENOSIS: ICD-10-CM

## 2021-12-07 DIAGNOSIS — I10 PRIMARY HYPERTENSION: ICD-10-CM

## 2021-12-07 DIAGNOSIS — I50.32 CHRONIC DIASTOLIC (CONGESTIVE) HEART FAILURE (HCC): Primary | ICD-10-CM

## 2021-12-07 DIAGNOSIS — I34.2 NONRHEUMATIC MITRAL VALVE STENOSIS: ICD-10-CM

## 2021-12-07 PROCEDURE — 99214 OFFICE O/P EST MOD 30 MIN: CPT | Performed by: INTERNAL MEDICINE

## 2021-12-07 RX ORDER — ALLOPURINOL 300 MG/1
300 TABLET ORAL EVERY MORNING
Qty: 90 TABLET | Refills: 0 | Status: SHIPPED | OUTPATIENT
Start: 2021-12-07 | End: 2022-03-24 | Stop reason: SDUPTHER

## 2021-12-08 ENCOUNTER — TRANSITIONAL CARE MANAGEMENT (OUTPATIENT)
Dept: FAMILY MEDICINE CLINIC | Facility: CLINIC | Age: 66
End: 2021-12-08

## 2021-12-08 DIAGNOSIS — I48.0 PAF (PAROXYSMAL ATRIAL FIBRILLATION) (HCC): ICD-10-CM

## 2021-12-09 ENCOUNTER — REMOTE DEVICE CLINIC VISIT (OUTPATIENT)
Dept: CARDIOLOGY CLINIC | Facility: CLINIC | Age: 66
End: 2021-12-09
Payer: MEDICARE

## 2021-12-09 DIAGNOSIS — Z95.0 CARDIAC PACEMAKER IN SITU: Primary | ICD-10-CM

## 2021-12-09 PROCEDURE — 93296 REM INTERROG EVL PM/IDS: CPT | Performed by: INTERNAL MEDICINE

## 2021-12-09 PROCEDURE — 93294 REM INTERROG EVL PM/LDLS PM: CPT | Performed by: INTERNAL MEDICINE

## 2021-12-10 DIAGNOSIS — N18.30 TYPE 2 DIABETES MELLITUS WITH STAGE 3 CHRONIC KIDNEY DISEASE, WITHOUT LONG-TERM CURRENT USE OF INSULIN (HCC): ICD-10-CM

## 2021-12-10 DIAGNOSIS — E11.22 TYPE 2 DIABETES MELLITUS WITH STAGE 3 CHRONIC KIDNEY DISEASE, WITHOUT LONG-TERM CURRENT USE OF INSULIN (HCC): ICD-10-CM

## 2021-12-10 RX ORDER — OMEGA-3-ACID ETHYL ESTERS 1 G/1
2 CAPSULE, LIQUID FILLED ORAL 2 TIMES DAILY
Qty: 360 CAPSULE | Refills: 3 | Status: SHIPPED | OUTPATIENT
Start: 2021-12-10

## 2021-12-13 ENCOUNTER — OFFICE VISIT (OUTPATIENT)
Dept: FAMILY MEDICINE CLINIC | Facility: CLINIC | Age: 66
End: 2021-12-13
Payer: MEDICARE

## 2021-12-13 VITALS
SYSTOLIC BLOOD PRESSURE: 130 MMHG | BODY MASS INDEX: 28.67 KG/M2 | WEIGHT: 204.8 LBS | TEMPERATURE: 97.8 F | OXYGEN SATURATION: 90 % | DIASTOLIC BLOOD PRESSURE: 70 MMHG | HEIGHT: 71 IN | HEART RATE: 76 BPM | RESPIRATION RATE: 16 BRPM

## 2021-12-13 DIAGNOSIS — Z79.4 TYPE 2 DIABETES MELLITUS WITH STAGE 4 CHRONIC KIDNEY DISEASE, WITH LONG-TERM CURRENT USE OF INSULIN (HCC): Primary | ICD-10-CM

## 2021-12-13 DIAGNOSIS — I50.32 CHRONIC DIASTOLIC (CONGESTIVE) HEART FAILURE (HCC): ICD-10-CM

## 2021-12-13 DIAGNOSIS — E78.2 MIXED HYPERLIPIDEMIA: ICD-10-CM

## 2021-12-13 DIAGNOSIS — G47.33 OSA (OBSTRUCTIVE SLEEP APNEA): ICD-10-CM

## 2021-12-13 DIAGNOSIS — I73.9 PERIPHERAL ARTERIAL DISEASE (HCC): ICD-10-CM

## 2021-12-13 DIAGNOSIS — N18.4 TYPE 2 DIABETES MELLITUS WITH STAGE 4 CHRONIC KIDNEY DISEASE, WITH LONG-TERM CURRENT USE OF INSULIN (HCC): Primary | ICD-10-CM

## 2021-12-13 DIAGNOSIS — N18.4 STAGE 4 CHRONIC KIDNEY DISEASE (HCC): ICD-10-CM

## 2021-12-13 DIAGNOSIS — I10 PRIMARY HYPERTENSION: ICD-10-CM

## 2021-12-13 DIAGNOSIS — E11.22 TYPE 2 DIABETES MELLITUS WITH STAGE 4 CHRONIC KIDNEY DISEASE, WITH LONG-TERM CURRENT USE OF INSULIN (HCC): Primary | ICD-10-CM

## 2021-12-13 PROCEDURE — 99214 OFFICE O/P EST MOD 30 MIN: CPT | Performed by: FAMILY MEDICINE

## 2021-12-14 ENCOUNTER — APPOINTMENT (OUTPATIENT)
Dept: LAB | Facility: CLINIC | Age: 66
End: 2021-12-14
Payer: MEDICARE

## 2021-12-14 DIAGNOSIS — N18.4 STAGE 4 CHRONIC KIDNEY DISEASE (HCC): ICD-10-CM

## 2021-12-14 DIAGNOSIS — D50.9 IRON DEFICIENCY ANEMIA, UNSPECIFIED IRON DEFICIENCY ANEMIA TYPE: ICD-10-CM

## 2021-12-14 DIAGNOSIS — C18.7 MALIGNANT NEOPLASM OF SIGMOID COLON (HCC): ICD-10-CM

## 2021-12-14 DIAGNOSIS — D50.0 IRON DEFICIENCY ANEMIA DUE TO CHRONIC BLOOD LOSS: ICD-10-CM

## 2021-12-14 DIAGNOSIS — C18.7 MALIGNANT NEOPLASM OF SIGMOID COLON (HCC): Primary | ICD-10-CM

## 2021-12-14 DIAGNOSIS — N17.9 AKI (ACUTE KIDNEY INJURY) (HCC): Primary | ICD-10-CM

## 2021-12-14 LAB
ANION GAP SERPL CALCULATED.3IONS-SCNC: 5 MMOL/L (ref 4–13)
BASOPHILS # BLD AUTO: 0.1 THOUSANDS/ΜL (ref 0–0.1)
BASOPHILS NFR BLD AUTO: 1 % (ref 0–1)
BUN SERPL-MCNC: 39 MG/DL (ref 5–25)
CALCIUM SERPL-MCNC: 9.5 MG/DL (ref 8.3–10.1)
CHLORIDE SERPL-SCNC: 110 MMOL/L (ref 100–108)
CO2 SERPL-SCNC: 24 MMOL/L (ref 21–32)
CREAT SERPL-MCNC: 3.07 MG/DL (ref 0.6–1.3)
EOSINOPHIL # BLD AUTO: 0.47 THOUSAND/ΜL (ref 0–0.61)
EOSINOPHIL NFR BLD AUTO: 5 % (ref 0–6)
ERYTHROCYTE [DISTWIDTH] IN BLOOD BY AUTOMATED COUNT: 15.3 % (ref 11.6–15.1)
EST. AVERAGE GLUCOSE BLD GHB EST-MCNC: 117 MG/DL
FERRITIN SERPL-MCNC: 247 NG/ML (ref 8–388)
GFR SERPL CREATININE-BSD FRML MDRD: 19 ML/MIN/1.73SQ M
GLUCOSE P FAST SERPL-MCNC: 98 MG/DL (ref 65–99)
HBA1C MFR BLD: 5.7 %
HCT VFR BLD AUTO: 34 % (ref 36.5–49.3)
HGB BLD-MCNC: 10.7 G/DL (ref 12–17)
IMM GRANULOCYTES # BLD AUTO: 0.03 THOUSAND/UL (ref 0–0.2)
IMM GRANULOCYTES NFR BLD AUTO: 0 % (ref 0–2)
IRON SATN MFR SERPL: 14 % (ref 20–50)
IRON SERPL-MCNC: 43 UG/DL (ref 65–175)
LYMPHOCYTES # BLD AUTO: 0.85 THOUSANDS/ΜL (ref 0.6–4.47)
LYMPHOCYTES NFR BLD AUTO: 9 % (ref 14–44)
MCH RBC QN AUTO: 29.9 PG (ref 26.8–34.3)
MCHC RBC AUTO-ENTMCNC: 31.5 G/DL (ref 31.4–37.4)
MCV RBC AUTO: 95 FL (ref 82–98)
MONOCYTES # BLD AUTO: 0.71 THOUSAND/ΜL (ref 0.17–1.22)
MONOCYTES NFR BLD AUTO: 7 % (ref 4–12)
NEUTROPHILS # BLD AUTO: 7.49 THOUSANDS/ΜL (ref 1.85–7.62)
NEUTS SEG NFR BLD AUTO: 78 % (ref 43–75)
NRBC BLD AUTO-RTO: 0 /100 WBCS
PLATELET # BLD AUTO: 288 THOUSANDS/UL (ref 149–390)
PMV BLD AUTO: 9.9 FL (ref 8.9–12.7)
POTASSIUM SERPL-SCNC: 4.2 MMOL/L (ref 3.5–5.3)
RBC # BLD AUTO: 3.58 MILLION/UL (ref 3.88–5.62)
SODIUM SERPL-SCNC: 139 MMOL/L (ref 136–145)
TIBC SERPL-MCNC: 301 UG/DL (ref 250–450)
WBC # BLD AUTO: 9.65 THOUSAND/UL (ref 4.31–10.16)

## 2021-12-14 PROCEDURE — 80048 BASIC METABOLIC PNL TOTAL CA: CPT

## 2021-12-14 PROCEDURE — 85025 COMPLETE CBC W/AUTO DIFF WBC: CPT

## 2021-12-14 PROCEDURE — 82728 ASSAY OF FERRITIN: CPT

## 2021-12-14 PROCEDURE — 83550 IRON BINDING TEST: CPT

## 2021-12-14 PROCEDURE — 83036 HEMOGLOBIN GLYCOSYLATED A1C: CPT

## 2021-12-14 PROCEDURE — 36415 COLL VENOUS BLD VENIPUNCTURE: CPT

## 2021-12-14 PROCEDURE — 83540 ASSAY OF IRON: CPT

## 2021-12-14 RX ORDER — SODIUM CHLORIDE 9 MG/ML
20 INJECTION, SOLUTION INTRAVENOUS ONCE
Status: CANCELLED | OUTPATIENT
Start: 2021-12-20

## 2021-12-15 ENCOUNTER — TELEPHONE (OUTPATIENT)
Dept: NEPHROLOGY | Facility: CLINIC | Age: 66
End: 2021-12-15

## 2021-12-16 RX ORDER — SODIUM CHLORIDE 9 MG/ML
20 INJECTION, SOLUTION INTRAVENOUS ONCE
Status: CANCELLED | OUTPATIENT
Start: 2021-12-22

## 2021-12-21 ENCOUNTER — APPOINTMENT (OUTPATIENT)
Dept: LAB | Facility: CLINIC | Age: 66
End: 2021-12-21
Payer: MEDICARE

## 2021-12-21 DIAGNOSIS — N17.9 AKI (ACUTE KIDNEY INJURY) (HCC): ICD-10-CM

## 2021-12-21 LAB
ANION GAP SERPL CALCULATED.3IONS-SCNC: 9 MMOL/L (ref 4–13)
BUN SERPL-MCNC: 38 MG/DL (ref 5–25)
CALCIUM SERPL-MCNC: 9.5 MG/DL (ref 8.3–10.1)
CHLORIDE SERPL-SCNC: 108 MMOL/L (ref 100–108)
CO2 SERPL-SCNC: 22 MMOL/L (ref 21–32)
CREAT SERPL-MCNC: 3.22 MG/DL (ref 0.6–1.3)
GFR SERPL CREATININE-BSD FRML MDRD: 18 ML/MIN/1.73SQ M
GLUCOSE P FAST SERPL-MCNC: 80 MG/DL (ref 65–99)
POTASSIUM SERPL-SCNC: 4 MMOL/L (ref 3.5–5.3)
SODIUM SERPL-SCNC: 139 MMOL/L (ref 136–145)

## 2021-12-21 PROCEDURE — 36415 COLL VENOUS BLD VENIPUNCTURE: CPT

## 2021-12-21 PROCEDURE — 80048 BASIC METABOLIC PNL TOTAL CA: CPT

## 2021-12-22 ENCOUNTER — HOSPITAL ENCOUNTER (OUTPATIENT)
Dept: INFUSION CENTER | Facility: CLINIC | Age: 66
Discharge: HOME | End: 2021-12-22
Payer: MEDICARE

## 2021-12-22 VITALS
TEMPERATURE: 97.6 F | HEART RATE: 96 BPM | DIASTOLIC BLOOD PRESSURE: 58 MMHG | SYSTOLIC BLOOD PRESSURE: 107 MMHG | RESPIRATION RATE: 18 BRPM

## 2021-12-22 DIAGNOSIS — C18.7 MALIGNANT NEOPLASM OF SIGMOID COLON (HCC): Primary | ICD-10-CM

## 2021-12-22 DIAGNOSIS — N18.4 STAGE 4 CHRONIC KIDNEY DISEASE (HCC): ICD-10-CM

## 2021-12-22 DIAGNOSIS — D50.9 IRON DEFICIENCY ANEMIA, UNSPECIFIED IRON DEFICIENCY ANEMIA TYPE: ICD-10-CM

## 2021-12-22 PROCEDURE — 96365 THER/PROPH/DIAG IV INF INIT: CPT

## 2021-12-22 RX ORDER — SODIUM CHLORIDE 9 MG/ML
20 INJECTION, SOLUTION INTRAVENOUS ONCE
Status: COMPLETED | OUTPATIENT
Start: 2021-12-22 | End: 2021-12-22

## 2021-12-22 RX ORDER — SODIUM CHLORIDE 9 MG/ML
20 INJECTION, SOLUTION INTRAVENOUS ONCE
Status: CANCELLED | OUTPATIENT
Start: 2021-12-29

## 2021-12-22 RX ADMIN — FERUMOXYTOL 510 MG: 510 INJECTION INTRAVENOUS at 09:27

## 2021-12-22 RX ADMIN — SODIUM CHLORIDE 20 ML/HR: 0.9 INJECTION, SOLUTION INTRAVENOUS at 09:26

## 2021-12-23 ENCOUNTER — CONSULT (OUTPATIENT)
Dept: HEMATOLOGY ONCOLOGY | Facility: CLINIC | Age: 66
End: 2021-12-23
Payer: MEDICARE

## 2021-12-23 VITALS
SYSTOLIC BLOOD PRESSURE: 142 MMHG | TEMPERATURE: 97.7 F | RESPIRATION RATE: 18 BRPM | HEART RATE: 79 BPM | DIASTOLIC BLOOD PRESSURE: 80 MMHG | BODY MASS INDEX: 27.94 KG/M2 | WEIGHT: 199.6 LBS | HEIGHT: 71 IN | OXYGEN SATURATION: 95 %

## 2021-12-23 DIAGNOSIS — C18.6 MALIGNANT NEOPLASM OF DESCENDING COLON (HCC): ICD-10-CM

## 2021-12-23 DIAGNOSIS — C18.9 MALIGNANT NEOPLASM OF COLON, UNSPECIFIED PART OF COLON (HCC): ICD-10-CM

## 2021-12-23 PROCEDURE — 99204 OFFICE O/P NEW MOD 45 MIN: CPT | Performed by: INTERNAL MEDICINE

## 2021-12-23 NOTE — H&P (VIEW-ONLY)
Hematology/Oncology Outpatient consult  Tom Vinson 74 y o  male 1955 [id number]    Date:  12/23/2021        Assessment and Plan:  1  Malignant neoplasm of colon, unspecified part of colon [facility])  The patient and his wife were both educated extensively about colon cancer in general and about stage III in particular  He seems to have stage pT3 N1b, stage IIIB moderately differentiated adenocarcinoma of the colon status post laparoscopic resection  The patient was educated about the recent CT scan findings which showed 1 new lesion in the right lower lobe of the lung of unknown etiology  He also seems to have heterogeneous attenuation in the abdominal area which could be due to postoperative fat necrosis  The patient was told that we will pursue a PET-CT scan for further evaluation of the pulmonary nodule  He was told to absolutely avoid any type of IV contrast studies since he has a pretty advanced stage kidney dysfunction  We went through the potential benefit of adjuvant fluoropyrimidine-based adjuvant chemotherapy which provides an approximately 30 percent reduction in the risk of disease recurrence and a 22 to 32 percent reduction in mortality  However, the patient has multiple comorbid conditions including congestive heart failure and advanced renal failure  It is not entirely clear if the patient is going to tolerate adjuvant chemotherapy without complications  The patient was told that we would like to avoid any treatment which show would affect his kidney function since he is at the edge of require for hemodialysis  The patient was told that he might be a candidate for 5 fluorouracil based regimen without the oxaliplatin for 3-6 months  Single agent Capecitabine is also an option  However, capecitabine is relatively contraindicated and advanced renal failure patients  The patient was told that we will discuss his case with the Nephrology    Meanwhile, he will get a PET-CT scan and a Port-A-Cath placement  We will finalize his treatment plan pretty soon after the PET scan result is available  - CBC and differential; Future  - Comprehensive metabolic panel; Future  - Magnesium; Future  - LD,Blood; Future  - CEA; Future  - NM PET CT skull base to mid thigh; Future  - Ambulatory referral to Interventional Radiology; Future    2  Malignant neoplasm of descending colon ([facility] [address] )     - NM PET CT skull base to mid thigh; Future        HPI:  This is a 53-year-old male with multiple comorbid conditions including CHF, advanced chronic kidney disease, diabetes mellitus, arthritis, sleep apnea, etc   The patient apparently had multiple colonoscopies within this year  He was found to have adenocarcinoma in the sigmoid region rising from tubular adenoma on 01/08/2021  He had a PET-CT scan on 02/19/2021 which showed uptake in the sigmoid colon otherwise no finding for hypermetabolic metastasis was found  The patient had 2 other colonoscopies and finally had his laparoscopic surgery on 10/21/2021 which showed showed residual adenocarcinoma moderately differentiated arising in the tubular adenoma with high-grade dysplasia, pT3 with 3/16 lymph node involvement  All margins were negative without obvious lymphovascular or perineural invasion  The patient subsequently on 11/24/2021 had a CT scan of the chest abdomen pelvis and IV contrast was given by mistake which show resulted in significant worsening of his kidney function with creatinine around 7  The he required hospitalization  His baseline creatinine level is around 3  The CT scan on 11/24/2021 showed 1 new cm nodule ground-glass density in the superior segment of the right lower lobe which was thought to be inflammatory versus neoplastic  There are 2 areas of heterogeneous attenuation also in the abdominal area of unknown significance  Follow-up in 3 months was suggested              Interval history:  He is accompanied by his wife who seems to be very concerned about him  He is currently getting iron IV which is ordered by his nephrologist   His most recent blood work on 12/14/2021 showed hemoglobin of 10 7 with normal white cells and platelets  ROS: Review of Systems   Constitutional: Positive for appetite change and fatigue  Negative for chills and fever  HENT: Positive for hearing loss  Negative for ear pain and sore throat  Eyes: Negative for pain and visual disturbance  Respiratory: Positive for cough and shortness of breath  Cardiovascular: Negative for chest pain and palpitations  Gastrointestinal: Negative for abdominal pain and vomiting  Genitourinary: Negative for dysuria and hematuria  Musculoskeletal: Negative for arthralgias and back pain  Skin: Negative for color change and rash  Neurological: Positive for numbness  Negative for seizures and syncope  Psychiatric/Behavioral: Positive for sleep disturbance  All other systems reviewed and are negative        Past Medical History:   Diagnosis Date    Anemia     iron def    Arthritis     OA    Bruise of both arms     forearms and both hands    Bruises easily     Cancer [facility] [address] ) 09/01/2021    colon    [facility]est[facility]     Chronic kidney disease     CPAP (continuous positive airway pressure) dependence     Diabetes mellitus ([facility] [address] )     Diabetic foot ulcer ([facility] [address] )     Eczema     Erectile dysfunction     Gout     Heart murmur     History of permanent cardiac pacemaker placement 11/2018    Hyperlipidemia     Hypertension     Hypoglycemia 3/8/2019    Murmur     Nephropathy     Osteoarthritis     Pacemaker 11/06/2018    PAD [facility]peripheral artery disease) [facility]     Seasonal allergies     Sleep apnea     Toe infection     bilat great toes    Vertigo     Walks frequently     Wears dentures     upper    Wears glasses        Past Surgical History:   Procedure Laterality Date    CARDIAC PACEMAKER PLACEMENT      CARPAL TUNNEL RELEASE Left     CARPAL TUNNEL RELEASE Right     CARPAL TUNNEL RELEASE      COLONOSCOPY  2020    COLONOSCOPY      FOOT AMPUTATION Left 2/10/2020    Procedure: PARTIAL 1ST RAY AMPUTATION;  Surgeon: Sally Wilson DPM;  [facility];  Service: Podiatry    INCISION AND DRAINAGE OF WOUND Left 2020    Procedure: INCISION AND DRAINAGE (I&D) EXTREMITY AND REMOVAL OF SESMOID BONE;  Surgeon: Qiana Hair DPM;  [facility];  Service: Podiatry    IR PICC REPOSITION  2020    KNEE ARTHROSCOPY Left     KNEE ARTHROSCOPY Right     KNEE SURGERY      KS AMPUTATION TOE,I-P JT Bilateral 2017    Procedure: PARTIAL AMPUTATION RIGHT AND LEFT HALLUX ;  Surgeon: Sally Wilson DPM;  [facility];  Service: Podiatry    KS AMPUTATION TOE,MT-P JT Left 2017    Procedure: 2ND TOE AMPUTATION;  Surgeon: Sally Wilson DPM;  [facility];  Service: Podiatry    RESECTION LOW ANTERIOR LAPAROSCOPIC N/A 10/21/2021    Procedure: RESECTION LOW ANTERIOR LAPAROSCOPIC;  Surgeon: Tala Reddy MD;  [facility];  Service: Colorectal    SHOULDER ARTHROSCOPY Left     with screws,RTC    SHOULDER SURGERY      TOE AMPUTATION Left 2020    Procedure: Liz Blue;  Surgeon: Qiana Hair DPM;  [facility];  Service: Podiatry    UPPER GASTROINTESTINAL ENDOSCOPY  2020    Intestinal metaplasia prepyloric    VASECTOMY         Social History     Socioeconomic History    Marital status: /Civil Union     Spouse name: None    Number of children: None    Years of education: None    Highest education level: None   Occupational History    None   Tobacco Use    Smoking status: Former Smoker     Packs/day: 0 50     Years: 20 00     Pack years: 10 00     Types: Cigarettes     Start date: 1     Quit date:      Years since quittin 9    Smokeless tobacco: Never Used    Tobacco comment: quit 10 years ago   Vaping Use    Vaping Use: Never used   Substance and Sexual Activity  Alcohol use: Never     Alcohol/week: 0 0 standard drinks    Drug use: No    Sexual activity: Not Currently     Partners: Female   Other Topics Concern    None   Social History Narrative    ** Merged History Encounter **         Daily cola consumption (2 cans/day)     Social Determinants of Health     Financial Resource Strain: Not on file   Food Insecurity: Not on file   Transportation Needs: No Transportation Needs    Lack of Transportation (Medical): No    Lack of Transportation (Non-Medical): No   Physical Activity: Not on file   Stress: Not on file   Social Connections: Not on file   Intimate Partner Violence: Not on file   Housing Stability: Not on file       Family History   Problem Relation Age of Onset    Heart disease Father         passed away    Hypertension Father     Pulmonary embolism Father     Hypertension Mother         assed away       Allergies   Allergen Reactions    Invokana [Canagliflozin] Other (See Comments)     Caused circulation problems    Lamisil [Terbinafine] Blisters     Wife states " His skin peeled from head to toe"    Other Swelling     Pomegranate - facial swelling, no swelling of tongue, esophagus  Adhesive tape        Latex Rash         Current Outpatient Medications:     acetaminophen (TYLENOL) 325 mg tablet, Take 3 tablets (975 mg total) by mouth every 6 (six) hours as needed for mild pain, headaches or fever, Disp: , Rfl: 0    allopurinol (ZYLOPRIM) 300 mg tablet, Take 1 tablet (300 mg total) by mouth every morning, Disp: 90 tablet, Rfl: 0    amLODIPine (NORVASC) 10 mg tablet, Take 1 tablet (10 mg total) by mouth daily, Disp: 90 tablet, Rfl: 3    apixaban (ELIQUIS) 5 mg, Take 1 tablet (5 mg total) by mouth every 12 (twelve) hours, Disp: 180 tablet, Rfl: 3    Dupilumab (Dupixent) 300 MG/2ML SOPN, Inject 300 mg under the skin Once every 2 weeks, Disp: , Rfl:     famotidine (PEPCID) 40 MG tablet, TAKE 1 TABLET BY MOUTH EVERY DAY, Disp: 90 tablet, Rfl: 3   Insulin Degludec-Liraglutide (Xultophy) 100 units-3 6 mg/mL injection pen, Inject 19 units daily  , Disp: 18 mL, Rfl: 1    Insulin Pen Needle (BD Pen Needle Zenaida U/F) 32G X 4 MM MISC, Use 1 daily, Disp: 200 each, Rfl: 0    metoprolol tartrate (LOPRESSOR) 50 mg tablet, Take 1 tablet (50 mg total) by mouth every 12 (twelve) hours, Disp: 180 tablet, Rfl: 3    Multiple Vitamin (MULTIVITAMIN) tablet, Take 1 tablet by mouth daily IN AM, Disp: , Rfl:     omega-3-acid ethyl esters (LOVAZA) 1 g capsule, Take 2 capsules (2 g total) by mouth 2 (two) times a day Mail print prescription to pt, Disp: 360 capsule, Rfl: 3    OneTouch Ultra test strip, USE TO TEST BLOOD SUGAR 3 TIMES A DAY, Disp: 100 each, Rfl: 3    simvastatin (ZOCOR) 20 mg tablet, Take 1 tablet (20 mg total) by mouth daily at bedtime, Disp: 90 tablet, Rfl: 3    sodium bicarbonate 650 mg tablet, Take 2 tablets (1,300 mg total) by mouth 2 (two) times daily after meals, Disp: 180 tablet, Rfl: 0    tamsulosin (FLOMAX) 0 4 mg, TAKE 1 CAPSULE BY MOUTH EVERY DAY WITH DINNER, Disp: 90 capsule, Rfl: 3      Physical Exam:  /80 (BP Location: Left arm, Patient Position: Sitting, Cuff Size: Adult)   Pulse 79   Temp 97 7 °F (36 5 °C) (Temporal)   Resp 18   Ht 5' 10 5" (1 791 m)   Wt 90 5 kg (199 lb 9 6 oz)   SpO2 95%   BMI 28 23 kg/m²     Physical Exam  Constitutional:       Appearance: He is well-developed  He is ill-appearing  HENT:      Head: Normocephalic and atraumatic  Eyes:      General: No scleral icterus  Right eye: No discharge  Left eye: No discharge  Conjunctiva/sclera: Conjunctivae normal       Pupils: Pupils are equal, round, and reactive to light  Neck:      Thyroid: No thyromegaly  Trachea: No tracheal deviation  Cardiovascular:      Rate and Rhythm: Normal rate and regular rhythm  Heart sounds: Murmur (Systolic) heard  No friction rub     Pulmonary:      Effort: Pulmonary effort is normal  No respiratory distress  Breath sounds: Normal breath sounds  No wheezing or rales  Chest:      Chest wall: No tenderness  Abdominal:      General: There is no distension  Palpations: Abdomen is soft  There is no hepatomegaly, splenomegaly or mass  Tenderness: There is no abdominal tenderness  There is no guarding or rebound  Musculoskeletal:         General: No tenderness or deformity  Normal range of motion  Cervical back: Normal range of motion and neck supple  Right lower leg: Edema present  Left lower leg: Edema present  Lymphadenopathy:      Cervical: No cervical adenopathy  Skin:     General: Skin is warm and dry  Coloration: Skin is not pale  Findings: No erythema or rash  Neurological:      Mental Status: He is alert and oriented to person, place, and time  Cranial Nerves: No cranial nerve deficit  Coordination: Coordination normal       Deep Tendon Reflexes: Reflexes are normal and symmetric  Psychiatric:         Behavior: Behavior normal          Thought Content: Thought content normal          Judgment: Judgment normal            Labs:  Lab Results   Component Value Date    WBC 9 65 12/14/2021    HGB 10 7 (L) 12/14/2021    HCT 34 0 (L) 12/14/2021    MCV 95 12/14/2021     12/14/2021     Lab Results   Component Value Date     (L) 04/10/2017    K 4 0 12/21/2021     12/21/2021    CO2 22 12/21/2021    BUN 38 (H) 12/21/2021    CREATININE 3 22 (H) 12/21/2021    GLUCOSE 142 (H) 11/01/2018    GLUF 80 12/21/2021    CALCIUM 9 5 12/21/2021    CORRECTEDCA 9 9 11/28/2021    AST 17 11/28/2021    ALT 19 11/28/2021    ALKPHOS 102 11/28/2021    PROT 6 9 04/10/2017    BILITOT 0 7 04/10/2017    EGFR 18 12/21/2021     No results found for: TSH    Patient voiced understanding and agreement in the above discussion  Aware to contact our office with questions/symptoms in the interim

## 2021-12-27 ENCOUNTER — APPOINTMENT (OUTPATIENT)
Dept: LAB | Facility: CLINIC | Age: 66
End: 2021-12-27
Payer: MEDICARE

## 2021-12-27 DIAGNOSIS — N17.9 AKI (ACUTE KIDNEY INJURY) (HCC): ICD-10-CM

## 2021-12-27 DIAGNOSIS — C18.9 MALIGNANT NEOPLASM OF COLON, UNSPECIFIED PART OF COLON (HCC): ICD-10-CM

## 2021-12-27 LAB
ALBUMIN SERPL BCP-MCNC: 3.3 G/DL (ref 3.5–5)
ALP SERPL-CCNC: 102 U/L (ref 46–116)
ALT SERPL W P-5'-P-CCNC: 21 U/L (ref 12–78)
ANION GAP SERPL CALCULATED.3IONS-SCNC: 5 MMOL/L (ref 4–13)
AST SERPL W P-5'-P-CCNC: 23 U/L (ref 5–45)
BASOPHILS # BLD AUTO: 0.05 THOUSANDS/ΜL (ref 0–0.1)
BASOPHILS NFR BLD AUTO: 1 % (ref 0–1)
BILIRUB SERPL-MCNC: 0.84 MG/DL (ref 0.2–1)
BUN SERPL-MCNC: 33 MG/DL (ref 5–25)
CALCIUM ALBUM COR SERPL-MCNC: 10.8 MG/DL (ref 8.3–10.1)
CALCIUM SERPL-MCNC: 10.2 MG/DL (ref 8.3–10.1)
CEA SERPL-MCNC: 1.1 NG/ML (ref 0–3)
CHLORIDE SERPL-SCNC: 110 MMOL/L (ref 100–108)
CO2 SERPL-SCNC: 25 MMOL/L (ref 21–32)
CREAT SERPL-MCNC: 2.62 MG/DL (ref 0.6–1.3)
EOSINOPHIL # BLD AUTO: 0.46 THOUSAND/ΜL (ref 0–0.61)
EOSINOPHIL NFR BLD AUTO: 6 % (ref 0–6)
ERYTHROCYTE [DISTWIDTH] IN BLOOD BY AUTOMATED COUNT: 16.4 % (ref 11.6–15.1)
GFR SERPL CREATININE-BSD FRML MDRD: 24 ML/MIN/1.73SQ M
GLUCOSE P FAST SERPL-MCNC: 99 MG/DL (ref 65–99)
HCT VFR BLD AUTO: 36.9 % (ref 36.5–49.3)
HGB BLD-MCNC: 11.5 G/DL (ref 12–17)
IMM GRANULOCYTES # BLD AUTO: 0.06 THOUSAND/UL (ref 0–0.2)
IMM GRANULOCYTES NFR BLD AUTO: 1 % (ref 0–2)
LDH SERPL-CCNC: 320 U/L (ref 81–234)
LYMPHOCYTES # BLD AUTO: 1 THOUSANDS/ΜL (ref 0.6–4.47)
LYMPHOCYTES NFR BLD AUTO: 13 % (ref 14–44)
MAGNESIUM SERPL-MCNC: 2 MG/DL (ref 1.6–2.6)
MCH RBC QN AUTO: 29.6 PG (ref 26.8–34.3)
MCHC RBC AUTO-ENTMCNC: 31.2 G/DL (ref 31.4–37.4)
MCV RBC AUTO: 95 FL (ref 82–98)
MONOCYTES # BLD AUTO: 0.57 THOUSAND/ΜL (ref 0.17–1.22)
MONOCYTES NFR BLD AUTO: 7 % (ref 4–12)
NEUTROPHILS # BLD AUTO: 5.77 THOUSANDS/ΜL (ref 1.85–7.62)
NEUTS SEG NFR BLD AUTO: 72 % (ref 43–75)
NRBC BLD AUTO-RTO: 0 /100 WBCS
PLATELET # BLD AUTO: 316 THOUSANDS/UL (ref 149–390)
PMV BLD AUTO: 10.2 FL (ref 8.9–12.7)
POTASSIUM SERPL-SCNC: 4.1 MMOL/L (ref 3.5–5.3)
PROT SERPL-MCNC: 7.5 G/DL (ref 6.4–8.2)
RBC # BLD AUTO: 3.88 MILLION/UL (ref 3.88–5.62)
SODIUM SERPL-SCNC: 140 MMOL/L (ref 136–145)
WBC # BLD AUTO: 7.91 THOUSAND/UL (ref 4.31–10.16)

## 2021-12-27 PROCEDURE — 85025 COMPLETE CBC W/AUTO DIFF WBC: CPT

## 2021-12-27 PROCEDURE — 80053 COMPREHEN METABOLIC PANEL: CPT

## 2021-12-27 PROCEDURE — 82378 CARCINOEMBRYONIC ANTIGEN: CPT

## 2021-12-27 PROCEDURE — 83735 ASSAY OF MAGNESIUM: CPT

## 2021-12-27 PROCEDURE — 36415 COLL VENOUS BLD VENIPUNCTURE: CPT

## 2021-12-27 PROCEDURE — 83615 LACTATE (LD) (LDH) ENZYME: CPT

## 2021-12-28 ENCOUNTER — OFFICE VISIT (OUTPATIENT)
Dept: NEPHROLOGY | Facility: HOSPITAL | Age: 66
End: 2021-12-28
Payer: MEDICARE

## 2021-12-28 VITALS
DIASTOLIC BLOOD PRESSURE: 78 MMHG | WEIGHT: 204.2 LBS | HEIGHT: 71 IN | BODY MASS INDEX: 28.59 KG/M2 | HEART RATE: 60 BPM | SYSTOLIC BLOOD PRESSURE: 150 MMHG

## 2021-12-28 DIAGNOSIS — N18.32 STAGE 3B CHRONIC KIDNEY DISEASE (HCC): ICD-10-CM

## 2021-12-28 DIAGNOSIS — M86.9 OSTEOMYELITIS OF LEFT FOOT, UNSPECIFIED TYPE (HCC): ICD-10-CM

## 2021-12-28 DIAGNOSIS — R60.1 GENERALIZED EDEMA: ICD-10-CM

## 2021-12-28 DIAGNOSIS — N18.4 STAGE 4 CHRONIC KIDNEY DISEASE (HCC): Primary | ICD-10-CM

## 2021-12-28 PROCEDURE — 99214 OFFICE O/P EST MOD 30 MIN: CPT | Performed by: INTERNAL MEDICINE

## 2021-12-28 RX ORDER — TORSEMIDE 20 MG/1
20 TABLET ORAL DAILY
Qty: 90 TABLET | Refills: 3
Start: 2021-12-28 | End: 2021-12-28

## 2021-12-28 RX ORDER — TORSEMIDE 20 MG/1
40 TABLET ORAL DAILY
Qty: 90 TABLET | Refills: 3
Start: 2021-12-28 | End: 2022-02-03

## 2021-12-28 RX ORDER — SODIUM BICARBONATE 650 MG/1
650 TABLET ORAL
Qty: 180 TABLET | Refills: 0 | Status: SHIPPED | OUTPATIENT
Start: 2021-12-28 | End: 2022-04-22 | Stop reason: SDUPTHER

## 2021-12-29 ENCOUNTER — HOSPITAL ENCOUNTER (OUTPATIENT)
Dept: INFUSION CENTER | Facility: CLINIC | Age: 66
Discharge: HOME | End: 2021-12-29
Payer: MEDICARE

## 2021-12-29 VITALS
HEART RATE: 91 BPM | TEMPERATURE: 97.5 F | SYSTOLIC BLOOD PRESSURE: 117 MMHG | RESPIRATION RATE: 18 BRPM | DIASTOLIC BLOOD PRESSURE: 67 MMHG

## 2021-12-29 DIAGNOSIS — N18.4 STAGE 4 CHRONIC KIDNEY DISEASE (HCC): ICD-10-CM

## 2021-12-29 DIAGNOSIS — D50.9 IRON DEFICIENCY ANEMIA, UNSPECIFIED IRON DEFICIENCY ANEMIA TYPE: ICD-10-CM

## 2021-12-29 DIAGNOSIS — C18.7 MALIGNANT NEOPLASM OF SIGMOID COLON (HCC): Primary | ICD-10-CM

## 2021-12-29 PROCEDURE — 96365 THER/PROPH/DIAG IV INF INIT: CPT

## 2021-12-29 RX ORDER — SODIUM CHLORIDE 9 MG/ML
20 INJECTION, SOLUTION INTRAVENOUS ONCE
Status: COMPLETED | OUTPATIENT
Start: 2021-12-29 | End: 2021-12-29

## 2021-12-29 RX ORDER — SODIUM CHLORIDE 9 MG/ML
20 INJECTION, SOLUTION INTRAVENOUS ONCE
Status: CANCELLED | OUTPATIENT
Start: 2021-12-29

## 2021-12-29 RX ADMIN — FERUMOXYTOL 510 MG: 510 INJECTION INTRAVENOUS at 09:40

## 2021-12-29 RX ADMIN — SODIUM CHLORIDE 20 ML/HR: 0.9 INJECTION, SOLUTION INTRAVENOUS at 09:25

## 2021-12-29 NOTE — PROGRESS NOTES
Patient arrived to unit without complaints  Patient's vital signs stable prior to treatment  Patient tolerated Feraheme infusion without incident  Patient's vital signs stable at end of infusion  Patient declined AVS and no further appointments scheduled at this time  Patient left in stable condition

## 2021-12-30 ENCOUNTER — ANESTHESIA EVENT (OUTPATIENT)
Dept: PERIOP | Facility: AMBULARY SURGERY CENTER | Age: 66
End: 2021-12-30
Payer: MEDICARE

## 2022-01-01 DIAGNOSIS — N18.4 CKD (CHRONIC KIDNEY DISEASE), STAGE IV (HCC): ICD-10-CM

## 2022-01-01 DIAGNOSIS — I95.3 HEMODIALYSIS-ASSOCIATED HYPOTENSION: Primary | ICD-10-CM

## 2022-01-01 RX ORDER — TORSEMIDE 20 MG/1
80 TABLET ORAL
Qty: 90 TABLET | Refills: 3 | Status: SHIPPED | OUTPATIENT
Start: 2022-01-01

## 2022-01-01 RX ORDER — MIDODRINE HYDROCHLORIDE 5 MG/1
5 TABLET ORAL 3 TIMES WEEKLY
Qty: 30 TABLET | Refills: 2 | Status: SHIPPED | OUTPATIENT
Start: 2022-01-01

## 2022-01-01 RX ORDER — TORSEMIDE 20 MG/1
80 TABLET ORAL
Qty: 90 TABLET | Refills: 3 | Status: SHIPPED | OUTPATIENT
Start: 2022-01-01 | End: 2022-01-01 | Stop reason: SDUPTHER

## 2022-01-04 ENCOUNTER — HOSPITAL ENCOUNTER (OUTPATIENT)
Dept: NUCLEAR MEDICINE | Facility: HOSPITAL | Age: 67
Discharge: HOME | End: 2022-01-04
Attending: INTERNAL MEDICINE
Payer: MEDICARE

## 2022-01-04 DIAGNOSIS — C18.6 MALIGNANT NEOPLASM OF DESCENDING COLON (HCC): ICD-10-CM

## 2022-01-04 DIAGNOSIS — C18.9 MALIGNANT NEOPLASM OF COLON, UNSPECIFIED PART OF COLON (HCC): ICD-10-CM

## 2022-01-04 LAB — GLUCOSE SERPL-MCNC: 95 MG/DL (ref 65–140)

## 2022-01-04 PROCEDURE — A9552 F18 FDG: HCPCS

## 2022-01-04 PROCEDURE — G1004 CDSM NDSC: HCPCS

## 2022-01-04 PROCEDURE — 82948 REAGENT STRIP/BLOOD GLUCOSE: CPT

## 2022-01-04 PROCEDURE — 78815 PET IMAGE W/CT SKULL-THIGH: CPT

## 2022-01-10 ENCOUNTER — APPOINTMENT (OUTPATIENT)
Dept: LAB | Facility: CLINIC | Age: 67
End: 2022-01-10
Payer: MEDICARE

## 2022-01-10 LAB
ANION GAP SERPL CALCULATED.3IONS-SCNC: 6 MMOL/L (ref 4–13)
BUN SERPL-MCNC: 37 MG/DL (ref 5–25)
CALCIUM SERPL-MCNC: 9.7 MG/DL (ref 8.3–10.1)
CHLORIDE SERPL-SCNC: 109 MMOL/L (ref 100–108)
CO2 SERPL-SCNC: 23 MMOL/L (ref 21–32)
CREAT SERPL-MCNC: 3.13 MG/DL (ref 0.6–1.3)
ERYTHROCYTE [DISTWIDTH] IN BLOOD BY AUTOMATED COUNT: 17.3 % (ref 11.6–15.1)
GFR SERPL CREATININE-BSD FRML MDRD: 19 ML/MIN/1.73SQ M
GLUCOSE P FAST SERPL-MCNC: 91 MG/DL (ref 65–99)
HCT VFR BLD AUTO: 38 % (ref 36.5–49.3)
HGB BLD-MCNC: 12.3 G/DL (ref 12–17)
MCH RBC QN AUTO: 30.4 PG (ref 26.8–34.3)
MCHC RBC AUTO-ENTMCNC: 32.4 G/DL (ref 31.4–37.4)
MCV RBC AUTO: 94 FL (ref 82–98)
PHOSPHATE SERPL-MCNC: 3.8 MG/DL (ref 2.3–4.1)
PLATELET # BLD AUTO: 261 THOUSANDS/UL (ref 149–390)
PMV BLD AUTO: 10.2 FL (ref 8.9–12.7)
POTASSIUM SERPL-SCNC: 3.9 MMOL/L (ref 3.5–5.3)
RBC # BLD AUTO: 4.05 MILLION/UL (ref 3.88–5.62)
SODIUM SERPL-SCNC: 138 MMOL/L (ref 136–145)
URATE SERPL-MCNC: 4 MG/DL (ref 4.2–8)
WBC # BLD AUTO: 8.04 THOUSAND/UL (ref 4.31–10.16)

## 2022-01-10 PROCEDURE — 36415 COLL VENOUS BLD VENIPUNCTURE: CPT | Performed by: INTERNAL MEDICINE

## 2022-01-10 PROCEDURE — 84100 ASSAY OF PHOSPHORUS: CPT | Performed by: INTERNAL MEDICINE

## 2022-01-10 PROCEDURE — 85027 COMPLETE CBC AUTOMATED: CPT | Performed by: INTERNAL MEDICINE

## 2022-01-10 PROCEDURE — 80048 BASIC METABOLIC PNL TOTAL CA: CPT | Performed by: INTERNAL MEDICINE

## 2022-01-10 PROCEDURE — 84550 ASSAY OF BLOOD/URIC ACID: CPT | Performed by: INTERNAL MEDICINE

## 2022-01-11 ENCOUNTER — HOSPITAL ENCOUNTER (OUTPATIENT)
Facility: AMBULARY SURGERY CENTER | Age: 67
Setting detail: OUTPATIENT SURGERY
Discharge: HOME | End: 2022-01-11
Attending: RADIOLOGY | Admitting: RADIOLOGY
Payer: MEDICARE

## 2022-01-11 ENCOUNTER — ANESTHESIA (OUTPATIENT)
Dept: PERIOP | Facility: AMBULARY SURGERY CENTER | Age: 67
End: 2022-01-11
Payer: MEDICARE

## 2022-01-11 ENCOUNTER — TELEPHONE (OUTPATIENT)
Dept: NEPHROLOGY | Facility: CLINIC | Age: 67
End: 2022-01-11

## 2022-01-11 ENCOUNTER — APPOINTMENT (OUTPATIENT)
Dept: RADIOLOGY | Facility: AMBULARY SURGERY CENTER | Age: 67
End: 2022-01-11
Payer: MEDICARE

## 2022-01-11 VITALS
HEIGHT: 70 IN | OXYGEN SATURATION: 94 % | BODY MASS INDEX: 28.2 KG/M2 | TEMPERATURE: 97.1 F | DIASTOLIC BLOOD PRESSURE: 64 MMHG | WEIGHT: 197 LBS | HEART RATE: 68 BPM | SYSTOLIC BLOOD PRESSURE: 134 MMHG | RESPIRATION RATE: 20 BRPM

## 2022-01-11 DIAGNOSIS — N18.4 STAGE 4 CHRONIC KIDNEY DISEASE (HCC): Primary | ICD-10-CM

## 2022-01-11 LAB — GLUCOSE SERPL-MCNC: 101 MG/DL (ref 65–140)

## 2022-01-11 PROCEDURE — 82948 REAGENT STRIP/BLOOD GLUCOSE: CPT

## 2022-01-11 PROCEDURE — 77001 FLUOROGUIDE FOR VEIN DEVICE: CPT

## 2022-01-11 PROCEDURE — 36561 INSERT TUNNELED CV CATH: CPT | Performed by: RADIOLOGY

## 2022-01-11 PROCEDURE — 77001 FLUOROGUIDE FOR VEIN DEVICE: CPT | Performed by: RADIOLOGY

## 2022-01-11 PROCEDURE — 76937 US GUIDE VASCULAR ACCESS: CPT | Performed by: RADIOLOGY

## 2022-01-11 PROCEDURE — C1788 PORT, INDWELLING, IMP: HCPCS | Performed by: RADIOLOGY

## 2022-01-11 DEVICE — PORT DIGINTY 8FR MICRO-STICK KIT HEMODIAL MID SIZE
Type: IMPLANTABLE DEVICE | Site: CHEST | Status: NON-FUNCTIONAL
Removed: 2022-07-12

## 2022-01-11 RX ORDER — MIDAZOLAM HYDROCHLORIDE 2 MG/2ML
INJECTION, SOLUTION INTRAMUSCULAR; INTRAVENOUS AS NEEDED
Status: DISCONTINUED | OUTPATIENT
Start: 2022-01-11 | End: 2022-01-11

## 2022-01-11 RX ORDER — LIDOCAINE HYDROCHLORIDE AND EPINEPHRINE 10; 10 MG/ML; UG/ML
INJECTION, SOLUTION INFILTRATION; PERINEURAL AS NEEDED
Status: DISCONTINUED | OUTPATIENT
Start: 2022-01-11 | End: 2022-01-11 | Stop reason: HOSPADM

## 2022-01-11 RX ORDER — FENTANYL CITRATE 50 UG/ML
INJECTION, SOLUTION INTRAMUSCULAR; INTRAVENOUS AS NEEDED
Status: DISCONTINUED | OUTPATIENT
Start: 2022-01-11 | End: 2022-01-11

## 2022-01-11 RX ORDER — LIDOCAINE HYDROCHLORIDE 10 MG/ML
INJECTION, SOLUTION EPIDURAL; INFILTRATION; INTRACAUDAL; PERINEURAL AS NEEDED
Status: DISCONTINUED | OUTPATIENT
Start: 2022-01-11 | End: 2022-01-11

## 2022-01-11 RX ORDER — PROPOFOL 10 MG/ML
INJECTION, EMULSION INTRAVENOUS AS NEEDED
Status: DISCONTINUED | OUTPATIENT
Start: 2022-01-11 | End: 2022-01-11

## 2022-01-11 RX ORDER — ONDANSETRON 2 MG/ML
INJECTION INTRAMUSCULAR; INTRAVENOUS AS NEEDED
Status: DISCONTINUED | OUTPATIENT
Start: 2022-01-11 | End: 2022-01-11

## 2022-01-11 RX ORDER — CEFAZOLIN SODIUM 2 G/50ML
2000 SOLUTION INTRAVENOUS ONCE
Status: COMPLETED | OUTPATIENT
Start: 2022-01-11 | End: 2022-01-11

## 2022-01-11 RX ORDER — SODIUM CHLORIDE 9 MG/ML
INJECTION, SOLUTION INTRAVENOUS AS NEEDED
Status: DISCONTINUED | OUTPATIENT
Start: 2022-01-11 | End: 2022-01-11 | Stop reason: HOSPADM

## 2022-01-11 RX ORDER — SODIUM CHLORIDE, SODIUM LACTATE, POTASSIUM CHLORIDE, CALCIUM CHLORIDE 600; 310; 30; 20 MG/100ML; MG/100ML; MG/100ML; MG/100ML
INJECTION, SOLUTION INTRAVENOUS CONTINUOUS PRN
Status: DISCONTINUED | OUTPATIENT
Start: 2022-01-11 | End: 2022-01-11

## 2022-01-11 RX ADMIN — PROPOFOL 10 MG: 10 INJECTION, EMULSION INTRAVENOUS at 09:45

## 2022-01-11 RX ADMIN — MIDAZOLAM HYDROCHLORIDE 2 MG: 1 INJECTION, SOLUTION INTRAMUSCULAR; INTRAVENOUS at 09:40

## 2022-01-11 RX ADMIN — CEFAZOLIN SODIUM 2000 MG: 2 SOLUTION INTRAVENOUS at 09:47

## 2022-01-11 RX ADMIN — SODIUM CHLORIDE, SODIUM LACTATE, POTASSIUM CHLORIDE, AND CALCIUM CHLORIDE: .6; .31; .03; .02 INJECTION, SOLUTION INTRAVENOUS at 09:15

## 2022-01-11 RX ADMIN — FENTANYL CITRATE 25 MCG: 50 INJECTION, SOLUTION INTRAMUSCULAR; INTRAVENOUS at 09:45

## 2022-01-11 RX ADMIN — ONDANSETRON 4 MG: 2 INJECTION INTRAMUSCULAR; INTRAVENOUS at 09:45

## 2022-01-11 RX ADMIN — PROPOFOL 10 MG: 10 INJECTION, EMULSION INTRAVENOUS at 09:48

## 2022-01-11 RX ADMIN — PROPOFOL 10 MG: 10 INJECTION, EMULSION INTRAVENOUS at 09:51

## 2022-01-11 RX ADMIN — CEFAZOLIN SODIUM 2000 MG: 2 SOLUTION INTRAVENOUS at 09:25

## 2022-01-11 RX ADMIN — FENTANYL CITRATE 25 MCG: 50 INJECTION, SOLUTION INTRAMUSCULAR; INTRAVENOUS at 09:56

## 2022-01-11 RX ADMIN — LIDOCAINE HYDROCHLORIDE 30 MG: 10 INJECTION, SOLUTION EPIDURAL; INFILTRATION; INTRACAUDAL at 09:45

## 2022-01-11 NOTE — ANESTHESIA POSTPROCEDURE EVALUATION
Post-Op Assessment Note    CV Status:  Stable  Pain Score: 0    Pain management: adequate     Mental Status:  Alert and awake   Hydration Status:  Euvolemic   PONV Controlled:  Controlled   Airway Patency:  Patent      Post Op Vitals Reviewed: Yes      Staff: CRNA         No complications documented      BP   144/69   Temp   97   Pulse  74   Resp   16   SpO2   92 RA

## 2022-01-11 NOTE — ANESTHESIA PREPROCEDURE EVALUATION
Procedure:  INSERTION VENOUS PORT ( PORT-A-CATH) IR (N/A Chest)    Relevant Problems   CARDIO  medtronic pacemaker (2018)   (+) Complete heart block (HCC)   (+) Hyperlipidemia   (+) Hypertension   (+) Nonrheumatic aortic valve stenosis   (+) Other hyperlipidemia   (+) PAF (paroxysmal atrial fibrillation) (HCC)   (+) PVCs (premature ventricular contractions)   (+) Renovascular hypertension   (+) SSS (sick sinus syndrome) (HCC)   (+) Systolic murmur      ENDO   (+) Type 2 diabetes mellitus with chronic kidney disease, with long-term current use of insulin (HCC)   (+) Type 2 diabetes mellitus with diabetic neuropathy, without long-term current use of insulin (HCC)      GI/HEPATIC   (+) Colon cancer (HCC)      /RENAL   (+) ELZBIETA (acute kidney injury) (HCC)   (+) Stage 4 chronic kidney disease (HCC)   (+) Uremia      HEMATOLOGY   (+) Anemia   (+) Iron deficiency anemia      MUSCULOSKELETAL   (+) Arthritis   (+) Gout      NEURO/PSYCH   (+) Anxiety   (+) Chronic painful diabetic neuropathy (HCC)      PULMONARY   (+) Chronic obstructive pulmonary disease (HCC)   (+) NICOLASA (obstructive sleep apnea)   (+) RSV (respiratory syncytial virus pneumonia)      Other   (+) Osteomyelitis of left foot ([facility] [address] )        ECHO (5/2021)  LEFT VENTRICLE:  Systolic function was normal  Ejection fraction was estimated to be 60 %  There were no regional wall motion abnormalities  Wall thickness was moderately increased      LEFT ATRIUM:  The atrium was moderately dilated      MITRAL VALVE:  There was moderate to marked annular calcification  There was moderate thickening  There was moderately restricted mobility  There was moderate stenosis  Mean MV gradient 7 mm HG    MVA not accurate  There was mild to moderate regurgitation      AORTIC VALVE:  The valve was trileaflet  Leaflets exhibited moderately to markedly increased thickness  There was mild-moderate stenosis   Peak velocity across AV of 2 7 M/S  visually looks no more than moderate  There was mild to moderate regurgitation  Physical Exam    Airway    Mallampati score: II  TM Distance: >3 FB  Neck ROM: full     Dental   Comment: No loose lower teeth, upper dentures,     Cardiovascular      Pulmonary      Other Findings  Due to overall universal COVID-19 precautions and pandemic, I did not auscultate heart, lungs and abdomen due to the low yield in an asymptomatic patient  I preferred not to introduce a stethoscope during my examination due to the potential of spread of COVID-19 from patient to patient  Anesthesia Plan  ASA Score- 3     Anesthesia Type- IV sedation with anesthesia with ASA Monitors  Additional Monitors:   Airway Plan:     Comment: NPO after MN  Accucheck = 101    Last of eliquis on 1/10 in the morning  Last had beta-blocker on 1/10    Last pacemaker interrogation 12/2021 showing patient is V-paced >99% of the time and that device is working properly          Plan Factors-Exercise tolerance (METS): <4 METS  Chart reviewed  Patient summary reviewed  Patient is not a current smoker  Induction- intravenous  Postoperative Plan-     Informed Consent- Anesthetic plan and risks discussed with patient and spouse  I personally reviewed this patient with the CRNA  Discussed and agreed on the Anesthesia Plan with the CRNA  Rony Bush

## 2022-01-11 NOTE — INTERVAL H&P NOTE
Patient arrived to [facility] for port placement    The procedure and risks were discussed with the patient  All questions were answered  Informed consent was obtained  H & P reviewed after examining the patient and I find no changes in the patient condition since the H & P has been written  /74   Pulse 82   Temp 97 5 °F (36 4 °C) (Temporal)   Resp 16   Ht 5' 10" (1 778 m)   Wt 89 4 kg (197 lb)   SpO2 95%   BMI 28 27 kg/m²     Patient re-evaluated   Accept as history and physical     Alisha Douglas, DO/January 11, 2022/8:38 AM

## 2022-01-11 NOTE — TELEPHONE ENCOUNTER
----- Message from Jhonny Holm DO sent at 1/11/2022  4:27 PM EST -----  Creatinine slightly higher but stable from previous on higher dose of torsemide    Please let him know will continue for now

## 2022-01-11 NOTE — DISCHARGE INSTRUCTIONS
Implanted Venous Access Port   WHAT YOU NEED TO KNOW:   An implanted venous access port is a device used to give treatments and take blood  It may also be called a central venous access device (CVAD)  The port is a small container that is placed under your skin, usually in your upper chest  A port can also be placed in your arm or abdomen  The port is attached to a catheter that enters a large vein  Your healthcare provider may show you or a family member how to give medicines or liquids through your port  A healthcare provider may also visit you at home to give you medicines or treatments  DISCHARGE INSTRUCTIONS:   Call your local emergency number (911 in the [address] [facility]) if:   · You have pain in your arm, neck, shoulder, or chest     · You have trouble breathing that is getting worse over time  Call your healthcare provider if:   · Blood soaks through your bandage  · You hear a bubbling noise when your port is flushed  · The skin over or around your port breaks open  · Your heart is jumping or fluttering  · You have a headache, blurred vision, and feel confused  · You have a fever  · Your port site is red, swollen, or draining pus  · Your port site turns cold, changes color, or you cannot feel it  · The veins in your neck or chest bulge  · You have questions or concerns about your condition or care  Prevent an infection:   · Wash your hands often  Use soap and water  Clean your hands before and after you care for your port  Remind everyone who cares for your port to wash their hands  · Clean the skin around your port every day  Ask your healthcare provider what to use to clean your skin  · Check your skin for infection every day  Look for redness, swelling, or fluid oozing from the port site  Follow up with your healthcare provider as directed: You may need to return to have your stitches removed in 1 week  Dissolvable stitches will not need to be removed   Your body will absorb the stitches, or they will fall out on their own  Medical glue will peel off on its own in 5 to 10 days  Write down your questions so you remember to ask them during your visits  Implanted venous access information card: Your healthcare provider will give you a card with information about your port  Keep the card in a safe place that is easy to find  Activity:  You may return to your daily activities when the area heals  You will be able to bathe, shower, or swim after the area heals  © Copyright [employer] 2021 Information is for End User's use only and may not be sold, redistributed or otherwise used for commercial purposes  All illustrations and images included in CareNotes® are the copyrighted property of [employer]  or [facility] Alexis Bundy  The above information is an  only  It is not intended as medical advice for individual conditions or treatments  Talk to your doctor, nurse or pharmacist before following any medical regimen to see if it is safe and effective for you

## 2022-01-12 ENCOUNTER — TELEPHONE (OUTPATIENT)
Dept: ENDOCRINOLOGY | Facility: CLINIC | Age: 67
End: 2022-01-12

## 2022-01-12 NOTE — TELEPHONE ENCOUNTER
Spoke with patient about lab results  He expressed understanding that his creatinine is slightly higher but stable  He asked when he should go for lab work again before his appt with us

## 2022-01-13 ENCOUNTER — TELEPHONE (OUTPATIENT)
Dept: HEMATOLOGY ONCOLOGY | Facility: CLINIC | Age: 67
End: 2022-01-13

## 2022-01-13 ENCOUNTER — TELEMEDICINE (OUTPATIENT)
Dept: HEMATOLOGY ONCOLOGY | Facility: CLINIC | Age: 67
End: 2022-01-13
Payer: MEDICARE

## 2022-01-13 DIAGNOSIS — C18.7 MALIGNANT NEOPLASM OF SIGMOID COLON (HCC): Primary | ICD-10-CM

## 2022-01-13 PROCEDURE — 99214 OFFICE O/P EST MOD 30 MIN: CPT | Performed by: INTERNAL MEDICINE

## 2022-01-13 NOTE — TELEPHONE ENCOUNTER
Left message that Dr Balta Jones  Will not be in today and that we  Can do a virtual visit    Asked him  To call back @ [phone]

## 2022-01-13 NOTE — PROGRESS NOTES
Virtual Regular Visit    Verification of patient location:    Patient is located in the following state in which I hold an active license [place]      Assessment/Plan:  1  Malignant neoplasm of sigmoid colon ([address] )  I had a lengthy discussion I had a lengthy discussion with the patient and his wife with the patient and his wife regarding regarding the recent PET-CT scan which showed the recent PET-CT scan which showed multiple peritoneal multiple peritoneal hypermetabolic soft tissue hypermetabolic soft tissue foci involving the left mid abdomen foci involving the left mid abdomen most suggestive most suggestive of fat of fat necrosis necrosis  However  Peritoneal carcinomatosis cannot be ruled out  The patient was educated about the risk of pursuing chemotherapy peritoneal carcinomatosis cannot be ruled out with his advanced stage renal   The patient was educated about the risk of pursuing chemotherapy with his advanced stage renal disease disease  I had the chance of discussing his of discussing his case with case with the his nephrologist Dr Manjula Montano  Who is going to monitor his to monitor his kidney function kidney function closely while on chemotherapy  The patient will be started on the FOLFOX regimen without the oxaliplatin  He will be educated he will be educated extensively about the chemotherapy and the potential side effects including renal failure/hemodialysis risk  We will also monitor him closely with frequent imaging to rule out metastatic disease as it was suspected by the recent PET-CT scan        Problem List Items Addressed This Visit     None               Reason for visit is   Chief Complaint   Patient presents with    Virtual Regular Visit        Encounter provider Kimani Theodore MD    Provider located at [address] [place] [address]  [phone]      Recent Visits  No visits were found meeting these conditions  Showing recent visits within past 7 days and meeting all other requirements  Today's Visits  Date Type Provider Dept   01/13/22 Telephone Niels Joe [facility]   01/13/22 Carlos Henderson MD [facility]   Showing today's visits and meeting all other requirements  Future Appointments  No visits were found meeting these conditions  Showing future appointments within next 150 days and meeting all other requirements       The patient was identified by name and date of birth  Jes Yoder was informed that this is a telemedicine visit and that the visit is being conducted through [address] [place] [address] Now and patient was informed that this is a secure, HIPAA-compliant platform  He agrees to proceed     My office door was closed  No one else was in the room  He acknowledged consent and understanding of privacy and security of the video platform  The patient has agreed to participate and understands they can discontinue the visit at any time  Patient is aware this is a billable service  Subjective  Jes Yoder is a 74 y o  male   HPI   This is a 64-year-old male with multiple comorbid conditions including CHF, advanced chronic kidney disease, diabetes mellitus, arthritis, sleep apnea, etc   The patient apparently had multiple colonoscopies within this year  He was found to have adenocarcinoma in the sigmoid region rising from tubular adenoma on 01/08/2021  He had a PET-CT scan on 02/19/2021 which showed uptake in the sigmoid colon otherwise no finding for hypermetabolic metastasis was found  The patient had 2 other colonoscopies and finally had his laparoscopic surgery on 10/21/2021 which showed showed residual adenocarcinoma moderately differentiated arising in the tubular adenoma with high-grade dysplasia, pT3 with 3/16 lymph node involvement  All margins were negative without obvious lymphovascular or perineural invasion    The patient subsequently on 11/24/2021 had a CT scan of the chest abdomen pelvis and IV contrast was given by mistake which show resulted in significant worsening of his kidney function with creatinine around 7  The he required hospitalization  His baseline creatinine level is around 3  The CT scan on 11/24/2021 showed 1 new cm nodule ground-glass density in the superior segment of the right lower lobe which was thought to be inflammatory versus neoplastic  There are 2 areas of heterogeneous attenuation also in the abdominal area of unknown significance  Follow-up in 3 months was suggested  The patient stated that he was recently the patient stated that he was recently seen by seen by the Nephrology the Nephrology team team who is adjusting his diuretics  Who is adjusting his diuretics  He apparently he apparently also received couple of sessions also received couple of sessions of iron of iron IV which improved his energy level  IV which improved his energy level  He had a PET-CT scan on 01/04/2022 which showed:  IMPRESSION:     1   1 0 cm groundglass right lower lobe lung nodules seen on CT of chest dated 11/24/2021 is not definitively visualized on the current exam and likely has resolved although evaluation of the lung fields is limited secondary to respiratory motion   artifact  There is no focal FDG activity in the chest characteristic of hypermetabolic malignancy  Given limitations, short interval follow-up with CT of chest in 3 months is recommended to document resolution      2   Multifocal peritoneal hypermetabolic soft tissue foci involving the left mid abdomen and extending along the left paracolic gutter to the central lower abdomen/pelvis, most of which appears to be associated with internal attenuation and is most   suggestive of fat necrosis with underlying peritoneal carcinomatosis not excluded, particularly in the anterior left mid abdomen    As recommended previously, short interval follow-up with imaging in 3 months is recommended to ensure stability and exclude   developing peritoneal carcinomatosis      3  Moderate bilateral pleural effusions      4   Stable in size minimally FDG avid mediastinal/right hilar/left inguinal lymph nodes of uncertain clinical significance but most suggestive of a benign inflammatory process  Attention to these regions on follow-up scans is recommended      5   Air-fluid levels within the bilateral maxillary sinuses for which correlation is recommended to exclude acute sinusitis      Blood work blood work from 01/10/2020 to showed from 01/10/2020 to showed hemoglobin of 12 3 with normal hemoglobin of 12 3 with normal white cells and platelets  White cells and platelets  Creatinine creatinine 3 1 with normal calcium  3 1 with normal calcium  His his CEA CEA level level on 12/27/2021 was 1 1    On 12/27/2021 was 1 1      Past Medical History:   Diagnosis Date    Anemia     iron def    Arthritis     OA    Bruise of both arms     forearms and both hands    Bruises easily     Cancer ([address] ) 09/01/2021    colon    CHF (congestive heart failure) (HCC)     Chronic kidney disease     CPAP (continuous positive airway pressure) dependence     Diabetes mellitus ([address] )     Diabetic foot ulcer ([address] )     Eczema     Erectile dysfunction     Gout     Heart murmur     History of permanent cardiac pacemaker placement 11/2018    Hyperlipidemia     Hypertension     Hypoglycemia 3/8/2019    Murmur     Nephropathy     Osteoarthritis     Pacemaker 11/06/2018    PAD (peripheral artery disease) (HCC)     Seasonal allergies     Sleep apnea     Toe infection     bilat great toes    Vertigo     Walks frequently     Wears dentures     upper    Wears glasses        Past Surgical History:   Procedure Laterality Date    CARDIAC PACEMAKER PLACEMENT      CARPAL TUNNEL RELEASE Left     CARPAL TUNNEL RELEASE Right     CARPAL TUNNEL RELEASE      COLONOSCOPY  08/2020    COLONOSCOPY      FL GUIDED CENTRAL VENOUS ACCESS DEVICE INSERTION  1/11/2022    FOOT AMPUTATION Left 2/10/2020    Procedure: PARTIAL 1ST RAY AMPUTATION;  Surgeon: Monica Orozco DPM;  [facility];  Service: Podiatry    INCISION AND DRAINAGE OF WOUND Left 1/12/2020    Procedure: INCISION AND DRAINAGE (I&D) EXTREMITY AND REMOVAL OF SESMOID BONE;  Surgeon: Gearld Gilford, DPM;  [facility];  Service: Podiatry    IR PICC REPOSITION  1/14/2020    KNEE ARTHROSCOPY Left     KNEE ARTHROSCOPY Right     KNEE SURGERY      WI AMPUTATION TOE,I-P JT Bilateral 5/2/2017    Procedure: PARTIAL AMPUTATION RIGHT AND LEFT HALLUX ;  Surgeon: Monica Orozco DPM;  [facility];  Service: Podiatry    WI AMPUTATION TOE,MT-P JT Left 7/25/2017    Procedure: 2ND TOE AMPUTATION;  Surgeon: Monica Orozco DPM;  [facility];  Service: Podiatry    WI INSJ TUNNELED CTR VAD W/SUBQ PORT AGE 5 YR/> N/A 1/11/2022    Procedure: INSERTION VENOUS PORT ( PORT-A-CATH) IR;  Surgeon: Christina Hale DO;  [facility];  Service: Interventional Radiology    RESECTION LOW ANTERIOR LAPAROSCOPIC N/A 10/21/2021    Procedure: RESECTION LOW ANTERIOR LAPAROSCOPIC;  Surgeon: Davina Leo MD;  [facility];  Service: Colorectal    SHOULDER ARTHROSCOPY Left     with screws,RTC    SHOULDER SURGERY      TOE AMPUTATION Left 1/8/2020    Procedure: HALLUX AMPUTATION;  Surgeon: Gearld Gilford, DPM;  [facility];  Service: Podiatry    UPPER GASTROINTESTINAL ENDOSCOPY  03/2020    Intestinal metaplasia prepyloric    VASECTOMY         Current Outpatient Medications   Medication Sig Dispense Refill    acetaminophen (TYLENOL) 325 mg tablet Take 3 tablets (975 mg total) by mouth every 6 (six) hours as needed for mild pain, headaches or fever  0    allopurinol (ZYLOPRIM) 300 mg tablet Take 1 tablet (300 mg total) by mouth every morning 90 tablet 0    amLODIPine (NORVASC) 10 mg tablet Take 1 tablet (10 mg total) by mouth daily 90 tablet 3    apixaban (ELIQUIS) 5 mg Take 1 tablet (5 mg total) by mouth every 12 (twelve) hours 180 tablet 3    Dupilumab (Dupixent) 300 MG/2ML SOPN Inject 300 mg under the skin Once every 2 weeks      famotidine (PEPCID) 40 MG tablet TAKE 1 TABLET BY MOUTH EVERY DAY 90 tablet 3    Insulin Degludec-Liraglutide (Xultophy) 100 units-3 6 mg/mL injection pen Inject 19 units daily  18 mL 1    Insulin Pen Needle (BD Pen Needle Zenaida U/F) 32G X 4 MM MISC Use 1 daily 200 each 0    metoprolol tartrate (LOPRESSOR) 50 mg tablet Take 1 tablet (50 mg total) by mouth every 12 (twelve) hours 180 tablet 3    Multiple Vitamin (MULTIVITAMIN) tablet Take 1 tablet by mouth daily IN AM      omega-3-acid ethyl esters (LOVAZA) 1 g capsule Take 2 capsules (2 g total) by mouth 2 (two) times a day Mail print prescription to pt 360 capsule 3    OneTouch Ultra test strip USE TO TEST BLOOD SUGAR 3 TIMES A  each 3    simvastatin (ZOCOR) 20 mg tablet Take 1 tablet (20 mg total) by mouth daily at bedtime 90 tablet 3    sodium bicarbonate 650 mg tablet Take 1 tablet (650 mg total) by mouth 2 (two) times daily after meals 180 tablet 0    tamsulosin (FLOMAX) 0 4 mg TAKE 1 CAPSULE BY MOUTH EVERY DAY WITH DINNER 90 capsule 3    torsemide (DEMADEX) 20 mg tablet Take 2 tablets (40 mg total) by mouth daily 90 tablet 3     No current facility-administered medications for this visit  Allergies   Allergen Reactions    Invokana [Canagliflozin] Other (See Comments)     Caused circulation problems    Lamisil [Terbinafine] Blisters     Wife states " His skin peeled from head to toe"    Other Swelling     Pomegranate - facial swelling, no swelling of tongue, esophagus  Adhesive tape   Latex Rash       Review of Systems    Video Exam    There were no vitals filed for this visit      Physical Exam     I spent 20 minutes directly with the patient during this visit    VIRTUAL VISIT DISCLAIMER      Duke Sommers verbally agrees to participate in [employer]  Pt is aware that [employer] could be limited without vital signs or the ability to perform a full hands-on physical Major Haste understands he or the provider may request at any time to terminate the video visit and request the patient to seek care or treatment in person

## 2022-01-14 DIAGNOSIS — C18.7 MALIGNANT NEOPLASM OF SIGMOID COLON (HCC): ICD-10-CM

## 2022-01-14 DIAGNOSIS — T45.1X5A CHEMOTHERAPY-INDUCED NEUTROPENIA (HCC): Primary | ICD-10-CM

## 2022-01-14 DIAGNOSIS — D70.1 CHEMOTHERAPY-INDUCED NEUTROPENIA (HCC): ICD-10-CM

## 2022-01-14 DIAGNOSIS — D70.1 CHEMOTHERAPY-INDUCED NEUTROPENIA (HCC): Primary | ICD-10-CM

## 2022-01-14 DIAGNOSIS — C18.7 MALIGNANT NEOPLASM OF SIGMOID COLON (HCC): Primary | ICD-10-CM

## 2022-01-14 DIAGNOSIS — T45.1X5A CHEMOTHERAPY-INDUCED NEUTROPENIA (HCC): ICD-10-CM

## 2022-01-14 RX ORDER — SODIUM CHLORIDE 9 MG/ML
20 INJECTION, SOLUTION INTRAVENOUS ONCE AS NEEDED
Status: CANCELLED | OUTPATIENT
Start: 2022-01-18

## 2022-01-14 RX ORDER — DEXTROSE MONOHYDRATE 50 MG/ML
20 INJECTION, SOLUTION INTRAVENOUS ONCE
Status: CANCELLED | OUTPATIENT
Start: 2022-01-18

## 2022-01-14 RX ORDER — FLUOROURACIL 50 MG/ML
400 INJECTION, SOLUTION INTRAVENOUS ONCE
Status: CANCELLED | OUTPATIENT
Start: 2022-01-18

## 2022-01-17 ENCOUNTER — APPOINTMENT (OUTPATIENT)
Dept: LAB | Facility: CLINIC | Age: 67
End: 2022-01-17
Payer: MEDICARE

## 2022-01-17 ENCOUNTER — HOSPITAL ENCOUNTER (OUTPATIENT)
Dept: NON INVASIVE DIAGNOSTICS | Facility: CLINIC | Age: 67
Discharge: HOME | End: 2022-01-17
Payer: MEDICARE

## 2022-01-17 DIAGNOSIS — Z51.11 ENCOUNTER FOR CHEMOTHERAPY MANAGEMENT: Primary | ICD-10-CM

## 2022-01-17 DIAGNOSIS — N18.4 STAGE 4 CHRONIC KIDNEY DISEASE (HCC): ICD-10-CM

## 2022-01-17 DIAGNOSIS — T45.1X5A CHEMOTHERAPY-INDUCED NEUTROPENIA (HCC): ICD-10-CM

## 2022-01-17 DIAGNOSIS — C18.7 MALIGNANT NEOPLASM OF SIGMOID COLON (HCC): ICD-10-CM

## 2022-01-17 DIAGNOSIS — R60.1 GENERALIZED EDEMA: ICD-10-CM

## 2022-01-17 DIAGNOSIS — D70.1 CHEMOTHERAPY-INDUCED NEUTROPENIA (HCC): ICD-10-CM

## 2022-01-17 LAB
ALBUMIN SERPL BCP-MCNC: 3.8 G/DL (ref 3.5–5)
ALP SERPL-CCNC: 97 U/L (ref 46–116)
ALT SERPL W P-5'-P-CCNC: 21 U/L (ref 12–78)
ANION GAP SERPL CALCULATED.3IONS-SCNC: 5 MMOL/L (ref 4–13)
AST SERPL W P-5'-P-CCNC: 23 U/L (ref 5–45)
BASOPHILS # BLD AUTO: 0.09 THOUSANDS/ΜL (ref 0–0.1)
BASOPHILS NFR BLD AUTO: 1 % (ref 0–1)
BILIRUB SERPL-MCNC: 0.91 MG/DL (ref 0.2–1)
BUN SERPL-MCNC: 42 MG/DL (ref 5–25)
CALCIUM SERPL-MCNC: 9.4 MG/DL (ref 8.3–10.1)
CHLORIDE SERPL-SCNC: 105 MMOL/L (ref 100–108)
CO2 SERPL-SCNC: 24 MMOL/L (ref 21–32)
CREAT SERPL-MCNC: 3.53 MG/DL (ref 0.6–1.3)
EOSINOPHIL # BLD AUTO: 0.47 THOUSAND/ΜL (ref 0–0.61)
EOSINOPHIL NFR BLD AUTO: 4 % (ref 0–6)
ERYTHROCYTE [DISTWIDTH] IN BLOOD BY AUTOMATED COUNT: 17 % (ref 11.6–15.1)
GFR SERPL CREATININE-BSD FRML MDRD: 16 ML/MIN/1.73SQ M
GLUCOSE P FAST SERPL-MCNC: 88 MG/DL (ref 65–99)
HCT VFR BLD AUTO: 35.2 % (ref 36.5–49.3)
HGB BLD-MCNC: 11.6 G/DL (ref 12–17)
IMM GRANULOCYTES # BLD AUTO: 0.03 THOUSAND/UL (ref 0–0.2)
IMM GRANULOCYTES NFR BLD AUTO: 0 % (ref 0–2)
LYMPHOCYTES # BLD AUTO: 1.28 THOUSANDS/ΜL (ref 0.6–4.47)
LYMPHOCYTES NFR BLD AUTO: 12 % (ref 14–44)
MCH RBC QN AUTO: 30.6 PG (ref 26.8–34.3)
MCHC RBC AUTO-ENTMCNC: 33 G/DL (ref 31.4–37.4)
MCV RBC AUTO: 93 FL (ref 82–98)
MONOCYTES # BLD AUTO: 0.81 THOUSAND/ΜL (ref 0.17–1.22)
MONOCYTES NFR BLD AUTO: 8 % (ref 4–12)
NEUTROPHILS # BLD AUTO: 8.06 THOUSANDS/ΜL (ref 1.85–7.62)
NEUTS SEG NFR BLD AUTO: 75 % (ref 43–75)
NRBC BLD AUTO-RTO: 0 /100 WBCS
PLATELET # BLD AUTO: 264 THOUSANDS/UL (ref 149–390)
PMV BLD AUTO: 9.2 FL (ref 8.9–12.7)
POTASSIUM SERPL-SCNC: 4.2 MMOL/L (ref 3.5–5.3)
PROT SERPL-MCNC: 8 G/DL (ref 6.4–8.2)
RBC # BLD AUTO: 3.79 MILLION/UL (ref 3.88–5.62)
SODIUM SERPL-SCNC: 134 MMOL/L (ref 136–145)
WBC # BLD AUTO: 10.74 THOUSAND/UL (ref 4.31–10.16)

## 2022-01-17 PROCEDURE — 93985 DUP-SCAN HEMO COMPL BI STD: CPT

## 2022-01-17 PROCEDURE — 80053 COMPREHEN METABOLIC PANEL: CPT

## 2022-01-17 PROCEDURE — 85025 COMPLETE CBC W/AUTO DIFF WBC: CPT

## 2022-01-17 PROCEDURE — 93985 DUP-SCAN HEMO COMPL BI STD: CPT | Performed by: SURGERY

## 2022-01-17 PROCEDURE — 36415 COLL VENOUS BLD VENIPUNCTURE: CPT

## 2022-01-17 RX ORDER — ONDANSETRON HYDROCHLORIDE 8 MG/1
8 TABLET, FILM COATED ORAL EVERY 8 HOURS PRN
Qty: 20 TABLET | Refills: 3 | Status: SHIPPED | OUTPATIENT
Start: 2022-01-17

## 2022-01-18 ENCOUNTER — HOSPITAL ENCOUNTER (OUTPATIENT)
Dept: INFUSION CENTER | Facility: HOSPITAL | Age: 67
Discharge: HOME | End: 2022-01-18
Attending: INTERNAL MEDICINE
Payer: MEDICARE

## 2022-01-18 ENCOUNTER — OFFICE VISIT (OUTPATIENT)
Dept: HEMATOLOGY ONCOLOGY | Facility: CLINIC | Age: 67
End: 2022-01-18
Payer: MEDICARE

## 2022-01-18 VITALS
OXYGEN SATURATION: 92 % | WEIGHT: 201.9 LBS | HEIGHT: 70 IN | DIASTOLIC BLOOD PRESSURE: 72 MMHG | HEART RATE: 56 BPM | TEMPERATURE: 96.1 F | BODY MASS INDEX: 28.9 KG/M2 | RESPIRATION RATE: 16 BRPM | SYSTOLIC BLOOD PRESSURE: 120 MMHG

## 2022-01-18 VITALS
OXYGEN SATURATION: 90 % | DIASTOLIC BLOOD PRESSURE: 66 MMHG | RESPIRATION RATE: 16 BRPM | HEART RATE: 88 BPM | HEIGHT: 70 IN | WEIGHT: 202.16 LBS | SYSTOLIC BLOOD PRESSURE: 146 MMHG | TEMPERATURE: 96.9 F | BODY MASS INDEX: 28.94 KG/M2

## 2022-01-18 DIAGNOSIS — C18.7 MALIGNANT NEOPLASM OF SIGMOID COLON (HCC): Primary | ICD-10-CM

## 2022-01-18 DIAGNOSIS — T45.1X5A CHEMOTHERAPY-INDUCED NEUTROPENIA (HCC): Primary | ICD-10-CM

## 2022-01-18 DIAGNOSIS — D70.1 CHEMOTHERAPY-INDUCED NEUTROPENIA (HCC): Primary | ICD-10-CM

## 2022-01-18 DIAGNOSIS — D70.1 CHEMOTHERAPY-INDUCED NEUTROPENIA (HCC): ICD-10-CM

## 2022-01-18 DIAGNOSIS — T45.1X5A CHEMOTHERAPY-INDUCED NEUTROPENIA (HCC): ICD-10-CM

## 2022-01-18 DIAGNOSIS — C18.7 MALIGNANT NEOPLASM OF SIGMOID COLON (HCC): ICD-10-CM

## 2022-01-18 PROCEDURE — 96409 CHEMO IV PUSH SNGL DRUG: CPT

## 2022-01-18 PROCEDURE — 96367 TX/PROPH/DG ADDL SEQ IV INF: CPT

## 2022-01-18 PROCEDURE — 99211 OFF/OP EST MAY X REQ PHY/QHP: CPT

## 2022-01-18 PROCEDURE — G0498 CHEMO EXTEND IV INFUS W/PUMP: HCPCS

## 2022-01-18 RX ORDER — FLUOROURACIL 50 MG/ML
400 INJECTION, SOLUTION INTRAVENOUS ONCE
Status: COMPLETED | OUTPATIENT
Start: 2022-01-18 | End: 2022-01-18

## 2022-01-18 RX ORDER — SODIUM CHLORIDE 9 MG/ML
20 INJECTION, SOLUTION INTRAVENOUS ONCE AS NEEDED
Status: DISCONTINUED | OUTPATIENT
Start: 2022-01-18 | End: 2022-01-22 | Stop reason: HOSPADM

## 2022-01-18 RX ORDER — DEXTROSE MONOHYDRATE 50 MG/ML
20 INJECTION, SOLUTION INTRAVENOUS ONCE
Status: DISCONTINUED | OUTPATIENT
Start: 2022-01-18 | End: 2022-01-22 | Stop reason: HOSPADM

## 2022-01-18 RX ADMIN — SODIUM CHLORIDE 20 ML/HR: 0.9 INJECTION, SOLUTION INTRAVENOUS at 12:16

## 2022-01-18 RX ADMIN — LEUCOVORIN CALCIUM 800 MG: 500 INJECTION, POWDER, LYOPHILIZED, FOR SOLUTION INTRAMUSCULAR; INTRAVENOUS at 13:15

## 2022-01-18 RX ADMIN — FLUOROURACIL 830 MG: 50 INJECTION, SOLUTION INTRAVENOUS at 14:28

## 2022-01-18 RX ADMIN — DEXAMETHASONE SODIUM PHOSPHATE: 10 INJECTION, SOLUTION INTRAMUSCULAR; INTRAVENOUS at 12:18

## 2022-01-18 NOTE — PROGRESS NOTES
CHEMO EDUCATION provided to pt and his wife on LCV/5FU every 2 weeks  Information sheets on both drugs and also gave Neulasta on pro information for possible future use  Pt has phone contact information and my Teams phone number  Questions were answered and consents for chemo and blood were signed and faxed to [phone] and scanned to pts chart

## 2022-01-18 NOTE — PROGRESS NOTES
Initial intake completed  No show policy signed  Distress thermometer completed  Patient tolerated IV Leucovorin, Adrucil push without reaction or incident  Adrucil CADD pump connected  CADD pump will run for 46 hours  AVS given to patient  Patient is aware to return 1/20/22 at 12:45 PM for CADD pump disconnect  Patient given new CADD pump [employer] packet and chemo spill kit for at home  Patient ambulated off unit without incident  All personal belongings taken with patient  Reviewed above CADD pump information with patient's wife Malachi Bowling  Patient and Malachi Bowling verbalized understanding of above information

## 2022-01-20 ENCOUNTER — TELEPHONE (OUTPATIENT)
Dept: INFUSION CENTER | Facility: HOSPITAL | Age: 67
End: 2022-01-20

## 2022-01-20 ENCOUNTER — HOSPITAL ENCOUNTER (OUTPATIENT)
Dept: INFUSION CENTER | Facility: HOSPITAL | Age: 67
Discharge: HOME | End: 2022-01-20
Attending: INTERNAL MEDICINE
Payer: MEDICARE

## 2022-01-20 DIAGNOSIS — C18.7 MALIGNANT NEOPLASM OF SIGMOID COLON (HCC): Primary | ICD-10-CM

## 2022-01-20 DIAGNOSIS — D70.1 CHEMOTHERAPY-INDUCED NEUTROPENIA (HCC): ICD-10-CM

## 2022-01-20 DIAGNOSIS — T45.1X5A CHEMOTHERAPY-INDUCED NEUTROPENIA (HCC): ICD-10-CM

## 2022-01-20 PROBLEM — R11.0 NAUSEA: Status: ACTIVE | Noted: 2022-01-20

## 2022-01-20 PROCEDURE — 96365 THER/PROPH/DIAG IV INF INIT: CPT

## 2022-01-20 PROCEDURE — 96361 HYDRATE IV INFUSION ADD-ON: CPT

## 2022-01-20 RX ADMIN — ONDANSETRON 8 MG: 2 INJECTION INTRAMUSCULAR; INTRAVENOUS at 13:08

## 2022-01-20 RX ADMIN — SODIUM CHLORIDE 1000 ML: 0.9 INJECTION, SOLUTION INTRAVENOUS at 12:57

## 2022-01-20 NOTE — TELEPHONE ENCOUNTER
Instructed Shayla to take pt to [facility] inf so he can have IV hydration and some Zofran  Pt not having much vomiting , just weak

## 2022-01-20 NOTE — TELEPHONE ENCOUNTER
Patient's wife Roman Mention called to report that her  is too sick to come in today to have his CADD pump disconnected  Per Roman Mention she stated that he has been sick since treatment  Patient can't get out of bed  Experiencing vomiting and weakness  Advised to call Cassie Mcgill RN at office to discuss above  Dr Jazmyne Diane may want patient to go to the ER for evaluation  IM sent to Ibeth Harper  She will be calling Roman Mention back now

## 2022-01-21 ENCOUNTER — HOSPITAL ENCOUNTER (INPATIENT)
Facility: HOSPITAL | Age: 67
LOS: 7 days | Discharge: HOME HEALTH | DRG: 682 | End: 2022-01-28
Attending: FAMILY MEDICINE | Admitting: INTERNAL MEDICINE
Payer: MEDICARE

## 2022-01-21 ENCOUNTER — TELEPHONE (OUTPATIENT)
Dept: NEPHROLOGY | Facility: CLINIC | Age: 67
End: 2022-01-21

## 2022-01-21 ENCOUNTER — TELEPHONE (OUTPATIENT)
Dept: HEMATOLOGY ONCOLOGY | Facility: CLINIC | Age: 67
End: 2022-01-21

## 2022-01-21 ENCOUNTER — APPOINTMENT (EMERGENCY)
Dept: RADIOLOGY | Facility: HOSPITAL | Age: 67
DRG: 682 | End: 2022-01-21
Payer: MEDICARE

## 2022-01-21 ENCOUNTER — HOSPITAL ENCOUNTER (OUTPATIENT)
Dept: INFUSION CENTER | Facility: HOSPITAL | Age: 67
Discharge: HOME | End: 2022-01-21
Payer: MEDICARE

## 2022-01-21 ENCOUNTER — APPOINTMENT (OUTPATIENT)
Dept: INTERVENTIONAL RADIOLOGY/VASCULAR | Facility: HOSPITAL | Age: 67
DRG: 682 | End: 2022-01-21
Attending: RADIOLOGY
Payer: MEDICARE

## 2022-01-21 VITALS
OXYGEN SATURATION: 98 % | HEART RATE: 61 BPM | RESPIRATION RATE: 20 BRPM | TEMPERATURE: 97.1 F | DIASTOLIC BLOOD PRESSURE: 65 MMHG | SYSTOLIC BLOOD PRESSURE: 143 MMHG

## 2022-01-21 DIAGNOSIS — R06.00 DYSPNEA: ICD-10-CM

## 2022-01-21 DIAGNOSIS — N17.9 ACUTE KIDNEY INJURY SUPERIMPOSED ON CHRONIC KIDNEY DISEASE (HCC): ICD-10-CM

## 2022-01-21 DIAGNOSIS — C18.7 MALIGNANT NEOPLASM OF SIGMOID COLON (HCC): ICD-10-CM

## 2022-01-21 DIAGNOSIS — I50.32 CHRONIC DIASTOLIC (CONGESTIVE) HEART FAILURE (HCC): ICD-10-CM

## 2022-01-21 DIAGNOSIS — Z95.828 PORT-A-CATH IN PLACE: ICD-10-CM

## 2022-01-21 DIAGNOSIS — N18.32 STAGE 3B CHRONIC KIDNEY DISEASE (HCC): Primary | ICD-10-CM

## 2022-01-21 DIAGNOSIS — E11.621 DIABETIC ULCER OF LEFT FOOT ASSOCIATED WITH TYPE 2 DIABETES MELLITUS, UNSPECIFIED PART OF FOOT, UNSPECIFIED ULCER STAGE (HCC): ICD-10-CM

## 2022-01-21 DIAGNOSIS — D70.1 CHEMOTHERAPY-INDUCED NEUTROPENIA (HCC): ICD-10-CM

## 2022-01-21 DIAGNOSIS — M86.9 OSTEOMYELITIS OF LEFT FOOT, UNSPECIFIED TYPE (HCC): ICD-10-CM

## 2022-01-21 DIAGNOSIS — I50.33 ACUTE ON CHRONIC DIASTOLIC CONGESTIVE HEART FAILURE (HCC): ICD-10-CM

## 2022-01-21 DIAGNOSIS — L97.529 DIABETIC ULCER OF LEFT FOOT ASSOCIATED WITH TYPE 2 DIABETES MELLITUS, UNSPECIFIED PART OF FOOT, UNSPECIFIED ULCER STAGE (HCC): ICD-10-CM

## 2022-01-21 DIAGNOSIS — N17.9 AKI (ACUTE KIDNEY INJURY) (HCC): Primary | ICD-10-CM

## 2022-01-21 DIAGNOSIS — N18.9 ACUTE KIDNEY INJURY SUPERIMPOSED ON CHRONIC KIDNEY DISEASE (HCC): ICD-10-CM

## 2022-01-21 DIAGNOSIS — T45.1X5A CHEMOTHERAPY-INDUCED NEUTROPENIA (HCC): ICD-10-CM

## 2022-01-21 LAB
2HR DELTA HS TROPONIN: 7 NG/L
4HR DELTA HS TROPONIN: 11 NG/L
ALBUMIN SERPL BCP-MCNC: 3.9 G/DL (ref 3.5–5)
ALP SERPL-CCNC: 50 U/L (ref 34–104)
ALT SERPL W P-5'-P-CCNC: 37 U/L (ref 7–52)
ANION GAP SERPL CALCULATED.3IONS-SCNC: 20 MMOL/L (ref 4–13)
ANION GAP SERPL CALCULATED.3IONS-SCNC: 20 MMOL/L (ref 4–13)
APTT PPP: 31 SECONDS (ref 23–37)
AST SERPL W P-5'-P-CCNC: 37 U/L (ref 13–39)
BASOPHILS # BLD AUTO: 0.02 THOUSANDS/ΜL (ref 0–0.1)
BASOPHILS NFR BLD AUTO: 0 % (ref 0–1)
BILIRUB SERPL-MCNC: 1.14 MG/DL (ref 0.2–1)
BNP SERPL-MCNC: 1067 PG/ML (ref 1–100)
BUN SERPL-MCNC: 97 MG/DL (ref 5–25)
BUN SERPL-MCNC: 98 MG/DL (ref 5–25)
CALCIUM SERPL-MCNC: 9.1 MG/DL (ref 8.4–10.2)
CALCIUM SERPL-MCNC: 9.4 MG/DL (ref 8.4–10.2)
CARDIAC TROPONIN I PNL SERPL HS: 53 NG/L
CARDIAC TROPONIN I PNL SERPL HS: 60 NG/L
CARDIAC TROPONIN I PNL SERPL HS: 64 NG/L
CHLORIDE SERPL-SCNC: 96 MMOL/L (ref 96–108)
CHLORIDE SERPL-SCNC: 97 MMOL/L (ref 96–108)
CO2 SERPL-SCNC: 14 MMOL/L (ref 21–32)
CO2 SERPL-SCNC: 15 MMOL/L (ref 21–32)
CREAT SERPL-MCNC: 5.53 MG/DL (ref 0.6–1.3)
CREAT SERPL-MCNC: 5.56 MG/DL (ref 0.6–1.3)
EOSINOPHIL # BLD AUTO: 0 THOUSAND/ΜL (ref 0–0.61)
EOSINOPHIL NFR BLD AUTO: 0 % (ref 0–6)
ERYTHROCYTE [DISTWIDTH] IN BLOOD BY AUTOMATED COUNT: 16.7 % (ref 11.6–15.1)
ERYTHROCYTE [DISTWIDTH] IN BLOOD BY AUTOMATED COUNT: 16.9 % (ref 11.6–15.1)
GFR SERPL CREATININE-BSD FRML MDRD: 9 ML/MIN/1.73SQ M
GFR SERPL CREATININE-BSD FRML MDRD: 9 ML/MIN/1.73SQ M
GLUCOSE SERPL-MCNC: 177 MG/DL (ref 65–140)
GLUCOSE SERPL-MCNC: 193 MG/DL (ref 65–140)
GLUCOSE SERPL-MCNC: 196 MG/DL (ref 65–140)
GLUCOSE SERPL-MCNC: 196 MG/DL (ref 65–140)
GLUCOSE SERPL-MCNC: 222 MG/DL (ref 65–140)
HCT VFR BLD AUTO: 37.2 % (ref 36.5–49.3)
HCT VFR BLD AUTO: 37.4 % (ref 36.5–49.3)
HGB BLD-MCNC: 12.2 G/DL (ref 12–17)
HGB BLD-MCNC: 12.3 G/DL (ref 12–17)
IMM GRANULOCYTES # BLD AUTO: 0.06 THOUSAND/UL (ref 0–0.2)
IMM GRANULOCYTES NFR BLD AUTO: 1 % (ref 0–2)
INR PPP: 1.77 (ref 0.84–1.19)
LYMPHOCYTES # BLD AUTO: 0.92 THOUSANDS/ΜL (ref 0.6–4.47)
LYMPHOCYTES NFR BLD AUTO: 7 % (ref 14–44)
MCH RBC QN AUTO: 30.7 PG (ref 26.8–34.3)
MCH RBC QN AUTO: 30.8 PG (ref 26.8–34.3)
MCHC RBC AUTO-ENTMCNC: 32.8 G/DL (ref 31.4–37.4)
MCHC RBC AUTO-ENTMCNC: 32.9 G/DL (ref 31.4–37.4)
MCV RBC AUTO: 94 FL (ref 82–98)
MCV RBC AUTO: 94 FL (ref 82–98)
MONOCYTES # BLD AUTO: 0.5 THOUSAND/ΜL (ref 0.17–1.22)
MONOCYTES NFR BLD AUTO: 4 % (ref 4–12)
NEUTROPHILS # BLD AUTO: 11.82 THOUSANDS/ΜL (ref 1.85–7.62)
NEUTS SEG NFR BLD AUTO: 88 % (ref 43–75)
NRBC BLD AUTO-RTO: 0 /100 WBCS
PHOSPHATE SERPL-MCNC: 9 MG/DL (ref 2.3–4.1)
PLATELET # BLD AUTO: 319 THOUSANDS/UL (ref 149–390)
PLATELET # BLD AUTO: 320 THOUSANDS/UL (ref 149–390)
PMV BLD AUTO: 9.7 FL (ref 8.9–12.7)
PMV BLD AUTO: 9.9 FL (ref 8.9–12.7)
POTASSIUM SERPL-SCNC: 5.1 MMOL/L (ref 3.5–5.3)
POTASSIUM SERPL-SCNC: 5.4 MMOL/L (ref 3.5–5.3)
PROT SERPL-MCNC: 6.7 G/DL (ref 6.4–8.4)
PROTHROMBIN TIME: 20.3 SECONDS (ref 11.6–14.5)
RBC # BLD AUTO: 3.97 MILLION/UL (ref 3.88–5.62)
RBC # BLD AUTO: 4 MILLION/UL (ref 3.88–5.62)
SODIUM SERPL-SCNC: 131 MMOL/L (ref 135–147)
SODIUM SERPL-SCNC: 131 MMOL/L (ref 135–147)
URATE SERPL-MCNC: 6.2 MG/DL (ref 3.5–8.5)
WBC # BLD AUTO: 12.39 THOUSAND/UL (ref 4.31–10.16)
WBC # BLD AUTO: 13.32 THOUSAND/UL (ref 4.31–10.16)

## 2022-01-21 PROCEDURE — 99024 POSTOP FOLLOW-UP VISIT: CPT | Performed by: RADIOLOGY

## 2022-01-21 PROCEDURE — 80048 BASIC METABOLIC PNL TOTAL CA: CPT

## 2022-01-21 PROCEDURE — 99285 EMERGENCY DEPT VISIT HI MDM: CPT

## 2022-01-21 PROCEDURE — 93005 ELECTROCARDIOGRAM TRACING: CPT

## 2022-01-21 PROCEDURE — 94660 CPAP INITIATION&MGMT: CPT

## 2022-01-21 PROCEDURE — 99285 EMERGENCY DEPT VISIT HI MDM: CPT | Performed by: PHYSICIAN ASSISTANT

## 2022-01-21 PROCEDURE — 84484 ASSAY OF TROPONIN QUANT: CPT | Performed by: PHYSICIAN ASSISTANT

## 2022-01-21 PROCEDURE — 85730 THROMBOPLASTIN TIME PARTIAL: CPT | Performed by: PHYSICIAN ASSISTANT

## 2022-01-21 PROCEDURE — 71045 X-RAY EXAM CHEST 1 VIEW: CPT

## 2022-01-21 PROCEDURE — 94760 N-INVAS EAR/PLS OXIMETRY 1: CPT

## 2022-01-21 PROCEDURE — 96374 THER/PROPH/DIAG INJ IV PUSH: CPT

## 2022-01-21 PROCEDURE — 99223 1ST HOSP IP/OBS HIGH 75: CPT | Performed by: NURSE PRACTITIONER

## 2022-01-21 PROCEDURE — 80053 COMPREHEN METABOLIC PANEL: CPT | Performed by: PHYSICIAN ASSISTANT

## 2022-01-21 PROCEDURE — 84100 ASSAY OF PHOSPHORUS: CPT

## 2022-01-21 PROCEDURE — 84550 ASSAY OF BLOOD/URIC ACID: CPT

## 2022-01-21 PROCEDURE — 94002 VENT MGMT INPAT INIT DAY: CPT

## 2022-01-21 PROCEDURE — 3C1ZX8Z IRRIGATION OF INDWELLING DEVICE USING IRRIGATING SUBSTANCE, EXTERNAL APPROACH: ICD-10-PCS | Performed by: RADIOLOGY

## 2022-01-21 PROCEDURE — 85025 COMPLETE CBC W/AUTO DIFF WBC: CPT | Performed by: PHYSICIAN ASSISTANT

## 2022-01-21 PROCEDURE — 96365 THER/PROPH/DIAG IV INF INIT: CPT

## 2022-01-21 PROCEDURE — 36415 COLL VENOUS BLD VENIPUNCTURE: CPT | Performed by: PHYSICIAN ASSISTANT

## 2022-01-21 PROCEDURE — 85027 COMPLETE CBC AUTOMATED: CPT

## 2022-01-21 PROCEDURE — 83880 ASSAY OF NATRIURETIC PEPTIDE: CPT | Performed by: PHYSICIAN ASSISTANT

## 2022-01-21 PROCEDURE — 82948 REAGENT STRIP/BLOOD GLUCOSE: CPT

## 2022-01-21 PROCEDURE — 85610 PROTHROMBIN TIME: CPT | Performed by: PHYSICIAN ASSISTANT

## 2022-01-21 RX ORDER — FAMOTIDINE 20 MG/1
40 TABLET, FILM COATED ORAL DAILY
Status: DISCONTINUED | OUTPATIENT
Start: 2022-01-22 | End: 2022-01-21 | Stop reason: DRUGHIGH

## 2022-01-21 RX ORDER — SODIUM BICARBONATE 650 MG/1
650 TABLET ORAL
Status: CANCELLED | OUTPATIENT
Start: 2022-01-21

## 2022-01-21 RX ORDER — CHLORAL HYDRATE 500 MG
1000 CAPSULE ORAL 2 TIMES DAILY
Status: DISCONTINUED | OUTPATIENT
Start: 2022-01-21 | End: 2022-01-28 | Stop reason: HOSPADM

## 2022-01-21 RX ORDER — METOPROLOL TARTRATE 50 MG/1
50 TABLET, FILM COATED ORAL EVERY 12 HOURS
Status: DISCONTINUED | OUTPATIENT
Start: 2022-01-21 | End: 2022-01-28 | Stop reason: HOSPADM

## 2022-01-21 RX ORDER — FAMOTIDINE 20 MG/1
20 TABLET, FILM COATED ORAL DAILY
Status: DISCONTINUED | OUTPATIENT
Start: 2022-01-22 | End: 2022-01-28 | Stop reason: HOSPADM

## 2022-01-21 RX ORDER — TAMSULOSIN HYDROCHLORIDE 0.4 MG/1
0.4 CAPSULE ORAL
Status: DISCONTINUED | OUTPATIENT
Start: 2022-01-21 | End: 2022-01-28 | Stop reason: HOSPADM

## 2022-01-21 RX ORDER — ONDANSETRON 2 MG/ML
4 INJECTION INTRAMUSCULAR; INTRAVENOUS EVERY 8 HOURS PRN
Status: DISCONTINUED | OUTPATIENT
Start: 2022-01-21 | End: 2022-01-22

## 2022-01-21 RX ORDER — AMLODIPINE BESYLATE 10 MG/1
10 TABLET ORAL DAILY
Status: DISCONTINUED | OUTPATIENT
Start: 2022-01-22 | End: 2022-01-23

## 2022-01-21 RX ORDER — FUROSEMIDE 10 MG/ML
40 INJECTION INTRAMUSCULAR; INTRAVENOUS ONCE
Status: COMPLETED | OUTPATIENT
Start: 2022-01-21 | End: 2022-01-21

## 2022-01-21 RX ADMIN — METOPROLOL TARTRATE 50 MG: 50 TABLET, FILM COATED ORAL at 18:30

## 2022-01-21 RX ADMIN — OMEGA-3 FATTY ACIDS CAP 1000 MG 1000 MG: 1000 CAP at 18:29

## 2022-01-21 RX ADMIN — INSULIN LISPRO 2 UNITS: 100 INJECTION, SOLUTION INTRAVENOUS; SUBCUTANEOUS at 22:30

## 2022-01-21 RX ADMIN — TAMSULOSIN HYDROCHLORIDE 0.4 MG: 0.4 CAPSULE ORAL at 18:29

## 2022-01-21 RX ADMIN — APIXABAN 5 MG: 5 TABLET, FILM COATED ORAL at 18:30

## 2022-01-21 RX ADMIN — ONDANSETRON 8 MG: 2 INJECTION INTRAMUSCULAR; INTRAVENOUS at 12:27

## 2022-01-21 RX ADMIN — INSULIN LISPRO 1 UNITS: 100 INJECTION, SOLUTION INTRAVENOUS; SUBCUTANEOUS at 19:22

## 2022-01-21 RX ADMIN — FUROSEMIDE 40 MG: 10 INJECTION, SOLUTION INTRAMUSCULAR; INTRAVENOUS at 14:26

## 2022-01-21 RX ADMIN — SODIUM CHLORIDE 1000 ML: 0.9 INJECTION, SOLUTION INTRAVENOUS at 12:10

## 2022-01-21 RX ADMIN — FUROSEMIDE 40 MG: 10 INJECTION, SOLUTION INTRAMUSCULAR; INTRAVENOUS at 18:35

## 2022-01-21 NOTE — TELEPHONE ENCOUNTER
Yes he can get a liter of NS today if he is not feeling well, not eating and drinking  Can you order a bmp, cbc, thanks

## 2022-01-21 NOTE — ASSESSMENT & PLAN NOTE
Lab Results   Component Value Date    HGBA1C 5 7 (H) 12/14/2021       Recent Labs     01/21/22  1547   POCGLU 196*       Blood Sugar Average: Last 72 hrs:  (P) 196     · Follows up with Podiatry  · Wound care consult

## 2022-01-21 NOTE — ASSESSMENT & PLAN NOTE
Lab Results   Component Value Date    EGFR 9 01/21/2022    EGFR 9 01/21/2022    EGFR 16 01/17/2022    CREATININE 5 53 (H) 01/21/2022    CREATININE 5 56 (H) 01/21/2022    CREATININE 3 53 (H) 01/17/2022     · History of CKD, recently started chemotherapy for colon cancer with significant worsening of kidney function with hospitalization for creatinine around 0 7   Baseline creatinine level is around 3  · ER PA consulted nephrology who recommended Lasix 80 mg and nephrology consultation  · I&O, daily weights  · Urinary retention protocol

## 2022-01-21 NOTE — ASSESSMENT & PLAN NOTE
· Adenocarcinoma 01/08/2021 currently being treated with chemotherapy at [facility] center  · Continue outpatient follow-up after resolution of acute issue

## 2022-01-21 NOTE — ASSESSMENT & PLAN NOTE
· Presented for shortness of breath while receiving fluids at infusion center due to nausea from chemotherapy  · He is on 4 L of oxygen chronically  · He was placed on CPAP by EMS prior to arrival for increased work of breathing  · EKG:  Paced rhythm  · Chest x-ray: Persistent or recurrent pulmonary vascular congestion Persistent cardiomegaly   · He received 40 mg of IV Lasix and is diuresing  · Per nephrology may give another 40 mg of Lasix IV

## 2022-01-21 NOTE — ASSESSMENT & PLAN NOTE
Lab Results   Component Value Date    HGBA1C 5 7 (H) 12/14/2021       Recent Labs     01/21/22  1547   POCGLU 196*       Blood Sugar Average: Last 72 hrs:  (P) 196     · Diabetic diet  · Fingerstick blood sugar with sliding scale coverage  · Hold home insulin Xultophy

## 2022-01-21 NOTE — H&P
Nir 43  H&P- Tasneem Adair 1955, 74 y o  male MRN: [id number]  Unit/Bed#: ED 27 Encounter: [id number]  Primary Care Provider: Sydney Ayala DO   Date and time admitted to hospital: 1/21/2022  1:36 PM    Dyspnea  Assessment & Plan  · Presented for shortness of breath while receiving fluids at infusion center due to nausea from chemotherapy  · He is on 4 L of oxygen chronically  · He was placed on CPAP by EMS prior to arrival for increased work of breathing  · EKG:  Paced rhythm  · Chest x-ray: Persistent or recurrent pulmonary vascular congestion Persistent cardiomegaly   · He received 40 mg of IV Lasix and is diuresing  · Per nephrology may give another 40 mg of Lasix IV      Acute kidney injury superimposed on chronic kidney disease [facility])  Assessment & Plan  Lab Results   Component Value Date    EGFR 9 01/21/2022    EGFR 9 01/21/2022    EGFR 16 01/17/2022    CREATININE 5 53 (H) 01/21/2022    CREATININE 5 56 (H) 01/21/2022    CREATININE 3 53 (H) 01/17/2022     · History of CKD, recently started chemotherapy for colon cancer with significant worsening of kidney function with hospitalization for creatinine around 0 7   Baseline creatinine level is around 3  · ER PA consulted nephrology who recommended Lasix 80 mg and nephrology consultation  · I&O, daily weights  · Urinary retention protocol    Colon cancer [facility])  Assessment & Plan  · Adenocarcinoma 01/08/2021 currently being treated with chemotherapy at infusion center  · Continue outpatient follow-up after resolution of acute issue    Type 2 diabetes mellitus with chronic kidney disease, with long-term current use of insulin [facility])  Assessment & Plan  Lab Results   Component Value Date    HGBA1C 5 7 (H) 12/14/2021       Recent Labs     01/21/22  1547   POCGLU 196*       Blood Sugar Average: Last 72 hrs:  (P) 196     · Diabetic diet  · Fingerstick blood sugar with sliding scale coverage  · Hold home insulin Xultophy    NICOLASA (obstructive sleep apnea)  Assessment & Plan  · CPAP at bedtime    Diabetic ulcer of left foot [facility])  Assessment & Plan  Lab Results   Component Value Date    HGBA1C 5 7 (H) 12/14/2021       Recent Labs     01/21/22  1547   POCGLU 196*       Blood Sugar Average: Last 72 hrs:  (P) 196     · Follows up with Podiatry  · Wound care consult    VTE Prophylaxis: Apixaban (Eliquis)  / sequential compression device   Code Status:  DNR DNI  POLST: POLST form is not discussed and not completed at this time  Discussion with family:  Patient    Anticipated Length of Stay:  Patient will be admitted on an Inpatient basis with an anticipated length of stay of  greater than 2 midnights  Justification for Hospital Stay:  Per plan above    Total Time for Visit, including Counseling / Coordination of Care: 45 minutes  Greater than 50% of this total time spent on direct patient counseling and coordination of care  Chief Complaint:   Dyspnea and acute kidney injury    History of Present Illness:    Kimberley Lombardo is a 74 y o  male with history of colon cancer, heart failure, venous thromboembolism, sick sinus syndrome with pacer, CKD, left great toe amputation, diabetic neuropathy, colon cancer newly diagnosed on chemotherapy who presents with dyspnea and acute kidney injury  He was at the infusion center receiving fluids due to nausea and decreased oral intake from chemotherapy when he became suddenly short of breath  CPAP was started by EMS, and patient was continued on high-flow and patient was continued on high-flow  He is now being titrated down to nasal cannula  Dyspnea has improved  He reports increased leg swelling over the past several days  He also reports a chronic left foot wound which is being treated by Podiatry  He has also felt generally weak  He denies fever, congestion, visual disturbance, abdominal pain, diarrhea, myalgia, dizziness, or syncope       Review of Systems:    Review of Systems Constitutional: Negative for chills and fever  HENT: Negative for congestion  Eyes: Negative for visual disturbance  Respiratory: Positive for shortness of breath  Negative for cough  Cardiovascular: Positive for leg swelling  Negative for chest pain and palpitations  Gastrointestinal: Positive for nausea  Negative for abdominal distention, abdominal pain, blood in stool, constipation, diarrhea and vomiting  Genitourinary: Negative for dysuria and flank pain  Musculoskeletal: Negative for arthralgias and myalgias  Skin: Positive for wound  Negative for pallor and rash  Neurological: Positive for weakness  Negative for dizziness, seizures, syncope, numbness and headaches  All other systems reviewed and are negative        Past Medical and Surgical History:     Past Medical History:   Diagnosis Date    Anemia     iron def    Arthritis     OA    Bruise of both arms     forearms and both hands    Bruises easily     Cancer ([facility] [address] ) 09/01/2021    colon    CHF [facility]congestive heart failure) ([facility]     Chronic kidney disease     CPAP (continuous positive airway pressure) dependence     Diabetes [facility]     Diabetic foot ulcer ([facility]     Eczema     Erectile dysfunction     Gout     Heart murmur     History of permanent cardiac pacemaker placement 11/2018    Hyperlipidemia     Hypertension     Hypoglycemia 3/8/2019    Murmur     Nephropathy     Osteoarthritis     Pacemaker 11/06/2018    PAD (peripheral artery disease) ([facility])     Seasonal allergies     Sleep apnea     Toe infection     bilat great toes    Vertigo     Walks frequently     Wears dentures     upper    Wears glasses        Past Surgical History:   Procedure Laterality Date    CARDIAC PACEMAKER PLACEMENT      CARPAL TUNNEL RELEASE Left     CARPAL TUNNEL RELEASE Right     CARPAL TUNNEL RELEASE      COLONOSCOPY  08/2020    COLONOSCOPY      FL GUIDED CENTRAL VENOUS ACCESS DEVICE INSERTION  1/11/2022    FOOT AMPUTATION Left 2/10/2020    Procedure: PARTIAL 1ST RAY AMPUTATION;  Surgeon: Katty Heart DPM;  [facility];  Service: Podiatry    INCISION AND DRAINAGE OF WOUND Left 1/12/2020    Procedure: INCISION AND DRAINAGE (I&D) EXTREMITY AND REMOVAL OF SESMOID BONE;  Surgeon: Alessandra Lyon DPM;  [facility];  Service: Podiatry    IR PICC REPOSITION  1/14/2020    KNEE ARTHROSCOPY Left     KNEE ARTHROSCOPY Right     KNEE SURGERY      FL AMPUTATION TOE,I-P JT Bilateral 5/2/2017    Procedure: PARTIAL AMPUTATION RIGHT AND LEFT HALLUX ;  Surgeon: Katty Heart DPM;  [facility];  Service: Podiatry    FL AMPUTATION TOE,MT-P JT Left 7/25/2017    Procedure: 2ND TOE AMPUTATION;  Surgeon: Katty Heart DPM;  [facility];  Service: Podiatry    FL INSJ TUNNELED CTR VAD W/SUBQ PORT AGE 5 YR/> N/A 1/11/2022    Procedure: INSERTION VENOUS PORT ( PORT-A-CATH) IR;  Surgeon: Silviano Swann DO;  [facility];  Service: Interventional Radiology    RESECTION LOW ANTERIOR LAPAROSCOPIC N/A 10/21/2021    Procedure: RESECTION LOW ANTERIOR LAPAROSCOPIC;  Surgeon: Sadia Villegas MD;  [facility];  Service: Colorectal    SHOULDER ARTHROSCOPY Left     with screws,RTC    SHOULDER SURGERY      TOE AMPUTATION Left 1/8/2020    Procedure: HALLUX AMPUTATION;  Surgeon: Alessandra Lyon DPM;  [facility];  Service: Podiatry    UPPER GASTROINTESTINAL ENDOSCOPY  03/2020    Intestinal metaplasia prepyloric    VASECTOMY         Meds/Allergies:    Prior to Admission medications    Medication Sig Start Date End Date Taking?  Authorizing Provider   acetaminophen (TYLENOL) 325 mg tablet Take 3 tablets (975 mg total) by mouth every 6 (six) hours as needed for mild pain, headaches or fever 10/27/21   Catia Chow PA-C   allopurinol (ZYLOPRIM) 300 mg tablet Take 1 tablet (300 mg total) by mouth every morning 12/7/21   Mikayla Garcia DO   amLODIPine (NORVASC) 10 mg tablet Take 1 tablet (10 mg total) by mouth daily 9/30/21   PAT Bhatia   apixaban (ELIQUIS) 5 mg Take 1 tablet (5 mg total) by mouth every 12 (twelve) hours 12/8/21 12/3/22  Linwood Barnard MD   Dupilumab (Dupixent) 300 MG/2ML SOPN Inject 300 mg under the skin Once every 2 weeks    Historical Provider, MD   famotidine (PEPCID) 40 MG tablet TAKE 1 TABLET BY MOUTH EVERY DAY 8/4/21   Farhat Claros DO   fluorouracil 4,970 mg in CADD infusion pump Infuse 4,970 mg (1,200 mg/m2/day x 2 07 m2) into a venous catheter over 46 hours for 2 days  Do not start before January 18, 2022 1/18/22 1/20/22  Sabrina Gentile MD   Insulin Degludec-Liraglutide (Xultophy) 100 units-3 6 mg/mL injection pen Inject 19 units daily   12/3/21   PAT Bhatia   Insulin Pen Needle (BD Pen Needle Zenaida U/F) 32G X 4 MM MISC Use 1 daily 8/4/21   Shade Jack PA-C   metoprolol tartrate (LOPRESSOR) 50 mg tablet Take 1 tablet (50 mg total) by mouth every 12 (twelve) hours 5/4/21   Francois Kawasaki, DO   Multiple Vitamin (MULTIVITAMIN) tablet Take 1 tablet by mouth daily IN AM    Historical Provider, MD   omega-3-acid ethyl esters (LOVAZA) 1 g capsule Take 2 capsules (2 g total) by mouth 2 (two) times a day Mail print prescription to pt 12/10/21   PAT Bhatia   ondansetron [facility]) 8 mg tablet Take 1 tablet (8 mg total) by mouth every 8 (eight) hours as needed for nausea or vomiting 1/17/22   Sabrina Gentile MD   OneTouch Ultra test strip USE TO TEST BLOOD SUGAR 3 TIMES A DAY 1/5/21   Farhat Claros DO   simvastatin (ZOCOR) 20 mg tablet Take 1 tablet (20 mg total) by mouth daily at bedtime 12/7/21   Linwood Barnard MD   sodium bicarbonate 650 mg tablet Take 1 tablet (650 mg total) by mouth 2 (two) times daily after meals 12/28/21   Francois Kawasaki,    tamsulosin (FLOMAX) 0 4 mg TAKE 1 CAPSULE BY MOUTH EVERY DAY WITH DINNER 6/13/21   Farhat Claros DO   torsemide (DEMADEX) 20 mg tablet Take 2 tablets (40 mg total) by mouth daily 21   Eladio Patton DO   glimepiride (AMARYL) 4 mg tablet TAKE 1 TABLET BY MOUTH TWICE A DAY  Patient taking differently: TAKE 1 TABLET BY MOUTH IN THE AM AND 1/2 TABLET IN PM 10/30/18 5/19/20  Anna Ruano DO     I have reviewed home medications with patient personally  Allergies: Allergies   Allergen Reactions    Invokana [Canagliflozin] Other (See Comments)     Caused circulation problems    Lamisil [Terbinafine] Blisters     Wife states " His skin peeled from head to toe"    Other Swelling     Pomegranate - facial swelling, no swelling of tongue, esophagus  Adhesive tape   Latex Rash       Social History:     Marital Status: /Civil Union   Occupation:  Not employed  Patient Pre-hospital Living Situation:  Home  Patient Pre-hospital Level of Mobility:  Independent  Patient Pre-hospital Diet Restrictions:  Diabetic low-salt  Substance Use History:   Social History     Substance and Sexual Activity   Alcohol Use Never    Alcohol/week: 0 0 standard drinks     Social History     Tobacco Use   Smoking Status Former Smoker    Packs/day: 0 50    Years: 20 00    Pack years: 10 00    Types: Cigarettes    Start date:     Quit date:     Years since quittin 0   Smokeless Tobacco Never Used   Tobacco Comment    quit 10 years ago     Social History     Substance and Sexual Activity   Drug Use No       Family History:    Family History   Problem Relation Age of Onset    Heart disease Father         passed away   Elyssa Main Hypertension Father     Pulmonary embolism Father     Hypertension Mother         assed away       Physical Exam:     Vitals:   Blood Pressure: 159/69 (22 1600)  Pulse: 63 (22 1600)  Respirations: 20 (22 1600)  SpO2: 97 % (22 1600)    Physical Exam  Vitals and nursing note reviewed  Constitutional:       General: He is not in acute distress  HENT:      Head: Normocephalic and atraumatic        Mouth/Throat:      Mouth: Mucous membranes are moist       Pharynx: Oropharynx is clear  Eyes:      Extraocular Movements: Extraocular movements intact  Pupils: Pupils are equal, round, and reactive to light  Cardiovascular:      Rate and Rhythm: Normal rate and regular rhythm  Pulses: Normal pulses  Heart sounds: Normal heart sounds  Pulmonary:      Effort: Pulmonary effort is normal       Breath sounds: Decreased breath sounds present  Abdominal:      General: Bowel sounds are normal       Palpations: Abdomen is soft  Tenderness: There is no abdominal tenderness  Musculoskeletal:         General: Normal range of motion  Cervical back: Normal range of motion and neck supple  Comments: Left foot dressing clean dry and intact with postop shoe in place   Skin:     General: Skin is warm and dry  Capillary Refill: Capillary refill takes less than 2 seconds  Neurological:      General: No focal deficit present  Mental Status: He is alert and oriented to person, place, and time  Additional Data:     Lab Results: I have personally reviewed pertinent reports  Results from last 7 days   Lab Units 01/21/22  1359   WBC Thousand/uL 13 32*   HEMOGLOBIN g/dL 12 2   HEMATOCRIT % 37 2   PLATELETS Thousands/uL 320   NEUTROS PCT % 88*   LYMPHS PCT % 7*   MONOS PCT % 4   EOS PCT % 0     Results from last 7 days   Lab Units 01/21/22  1359   SODIUM mmol/L 131*   POTASSIUM mmol/L 5 4*   CHLORIDE mmol/L 97   CO2 mmol/L 14*   BUN mg/dL 98*   CREATININE mg/dL 5 53*   ANION GAP mmol/L 20*   CALCIUM mg/dL 9 1   ALBUMIN g/dL 3 9   TOTAL BILIRUBIN mg/dL 1 14*   ALK PHOS U/L 50   ALT U/L 37   AST U/L 37   GLUCOSE RANDOM mg/dL 193*     Results from last 7 days   Lab Units 01/21/22  1359   INR  1 77*     Results from last 7 days   Lab Units 01/21/22  1547   POC GLUCOSE mg/dl 196*               Imaging: I have personally reviewed pertinent reports        XR chest 1 view portable   Final Result by Romero Hackett MD (01/21 1436)   Persistent or recurrent pulmonary vascular congestion      Persistent cardiomegaly                     Workstation performed: [id number]         IR other    (Results Pending)       EKG, Pathology, and Other Studies Reviewed on Admission:   · EKG:  Paced rate of 64  Allscripts / Epic Records Reviewed: Yes     ** Please Note: This note has been constructed using a voice recognition system   **

## 2022-01-21 NOTE — SEDATION DOCUMENTATION
Dr Whitley Perez de-accessed patients port to assess the site, noted a small open superficial area , applied 2 steri-strips to it  Made patient aware if there is any redness, discharge at the site or if he has any fevers to give IR a call right away  Port was re-accessed by Dr Whitley Perez without complications

## 2022-01-21 NOTE — PROGRESS NOTES
Mr John Eid was on our outpatient schedule today to evaluate his port site  He was brought to the ER this afternoon for shortness of breath  He was seen in the emergency department  His port is currently accessed with a split gauze underneath the Hancock needle and the incision itself is not visible  The dressing was taken down and port was de- accessed  Incision was inspected  The site was cleansed and Steri-Strips were applied to the medial side in the incision  The port was then reaccessed and flushed  Sterile dressing was applied  Incision has opened medially but is superficial   Deep edges are healed  No drainage  No erythema  He has had no fevers  Instructions were given to him and his wife to call us immediately should he have any fever, drainage, pain, or increasing redness around the site      Jerry Linares MD

## 2022-01-21 NOTE — TELEPHONE ENCOUNTER
Labs in process at this time due to hydration being done now  - Wife is aware to go back next week for routine labs

## 2022-01-21 NOTE — TELEPHONE ENCOUNTER
Spoke to wife advising okay for hydration     -waiting to hear back from Marcelle Garcia about when labs are needed either next week or mid feb as requested on 1/13   - [facility] you can call them about labs  Call wife [phone]

## 2022-01-21 NOTE — PROGRESS NOTES
Patient is here today for IV hydration  Patient received IV Zofran and approximately 400 ml NSS when he started complaining of shortness of breath at 12:47m PM   Room air sat 92-93 %  Patient placed on 4 L NC O2 sat went up to % on 4L NC   IV fluids stopped  Patient denies chest pain at present time  SOB improved on 4 L NC O2   Lung clear and decreased in bases  + heart murmur  Patient is not diaphoretic  Per Dr Tyler Winn patient is to be sent to ER for evaluation  911 called  Report given to paramedics  Report called to [place] ER, spoke with Bill  Advised that patient's port is still accessed and Dr Fariba Kurtz from IR was due to check his port incision site today at 3 pm at [facility]  (Please refer to yesterday's infusion documentation for site description )  Dr Fariba Kurtz tiger texted that patient was sent to [facility] with SOB  He may evaluate patient's site in the ER  Patient's wife is in the waiting room and was advised of above  She will meet patient at the ER  Patient transported without incident via 911

## 2022-01-21 NOTE — TELEPHONE ENCOUNTER
Patients wife called the office and advised that patient is having a hard time adjusting to his chemo therapy  Having issues with being sick and vomiting  They were called by the chemo nurse, Michelle Luong and advised if pt is okay to go for 1 L of Normal saline today  He did have hydration yesterday but nurses are scared his renal function will be affected if not treated  Call back for wife is [phone]

## 2022-01-21 NOTE — ED PROVIDER NOTES
History  Chief Complaint   Patient presents with    Shortness of Breath     59-year-old male history of colon cancer, type 2 diabetes, hypertension, hyperlipidemia, CKD presents via EMS complaining of shortness of breath  Patient reports he is getting chemotherapy done for his colon cancer and is on medication that can affect his kidney function  Patient went to the infusion center yesterday for IV fluid hydration and Zofran due to generalized weakness from chemotherapy  He received a L of IV fluid yesterday and went back today for the same treatment  Approximately 400 mL into his normal saline bolus, patient became very short of breath  Infusion center reports significant increased work of breathing  Patient is chronically on 4 L nasal cannula  He was placed on CPAP by EMS prior to arrival for increased work of breathing  Patient arrives in mild respiratory distress  Respiratory rate the mid 20s  Decreased air movement on exam but not toxic appearing  He denies any chest pain, fevers, chills, increased cough  Patient reports he is compliant with his diuretic medication and still adequately produces urine  Does note some increase bilateral lower extremity edema  Denies any calf pain, pleuritic pain or any personal or family history of DVT/PE  Prior to Admission Medications   Prescriptions Last Dose Informant Patient Reported? Taking? Dupilumab (Dupixent) 300 MG/2ML SOPN  Self Yes No   Sig: Inject 300 mg under the skin Once every 2 weeks   Insulin Degludec-Liraglutide (Xultophy) 100 units-3 6 mg/mL injection pen  Self No No   Sig: Inject 19 units daily     Insulin Pen Needle (BD Pen Needle Zenaida U/F) 32G X 4 MM MISC  Self No No   Sig: Use 1 daily   Multiple Vitamin (MULTIVITAMIN) tablet  Self Yes No   Sig: Take 1 tablet by mouth daily IN AM   OneTouch Ultra test strip  Self No No   Sig: USE TO TEST BLOOD SUGAR 3 TIMES A DAY   acetaminophen (TYLENOL) 325 mg tablet  Self No No   Sig: Take 3 tablets (975 mg total) by mouth every 6 (six) hours as needed for mild pain, headaches or fever   allopurinol (ZYLOPRIM) 300 mg tablet  Self No No   Sig: Take 1 tablet (300 mg total) by mouth every morning   amLODIPine (NORVASC) 10 mg tablet  Self No No   Sig: Take 1 tablet (10 mg total) by mouth daily   apixaban (ELIQUIS) 5 mg  Self No No   Sig: Take 1 tablet (5 mg total) by mouth every 12 (twelve) hours   famotidine (PEPCID) 40 MG tablet  Self No No   Sig: TAKE 1 TABLET BY MOUTH EVERY DAY   fluorouracil 4,970 mg in CADD infusion pump   No No   Sig: Infuse 4,970 mg (1,200 mg/m2/day x 2 07 m2) into a venous catheter over 46 hours for 2 days  Do not start before January 18, 2022     metoprolol tartrate (LOPRESSOR) 50 mg tablet  Self No No   Sig: Take 1 tablet (50 mg total) by mouth every 12 (twelve) hours   omega-3-acid ethyl esters (LOVAZA) 1 g capsule  Self No No   Sig: Take 2 capsules (2 g total) by mouth 2 (two) times a day Mail print prescription to pt   ondansetron (ZOFRAN) 8 mg tablet   No No   Sig: Take 1 tablet (8 mg total) by mouth every 8 (eight) hours as needed for nausea or vomiting   simvastatin (ZOCOR) 20 mg tablet  Self No No   Sig: Take 1 tablet (20 mg total) by mouth daily at bedtime   sodium bicarbonate 650 mg tablet   No No   Sig: Take 1 tablet (650 mg total) by mouth 2 (two) times daily after meals   tamsulosin (FLOMAX) 0 4 mg  Self No No   Sig: TAKE 1 CAPSULE BY MOUTH EVERY DAY WITH DINNER   torsemide (DEMADEX) 20 mg tablet   No No   Sig: Take 2 tablets (40 mg total) by mouth daily      Facility-Administered Medications: None       Past Medical History:   Diagnosis Date    Anemia     iron def    Arthritis     OA    Bruise of both arms     forearms and both hands    Bruises easily     [facility] ) 09/01/2021    colon    CHF (congestive heart failure) (HCC)     Chronic kidney disease     CPAP (continuous positive airway pressure) dependence     Diabetes mellitus ([facility] )     Diabetic foot ulcer ([facility] )     Eczema     Erectile dysfunction     Gout     Heart murmur     History of permanent cardiac pacemaker placement 11/2018    Hyperlipidemia     Hypertension     Hypoglycemia 3/8/2019    Murmur     Nephropathy     Osteoarthritis     Pacemaker 11/06/2018    [facility]     Seasonal allergies     Sleep apnea     Toe infection     bilat great toes    Vertigo     Walks frequently     Wears dentures     upper    Wears glasses        Past Surgical History:   Procedure Laterality Date    CARDIAC PACEMAKER PLACEMENT      CARPAL TUNNEL RELEASE Left     CARPAL TUNNEL RELEASE Right     CARPAL TUNNEL RELEASE      COLONOSCOPY  08/2020    COLONOSCOPY      FL GUIDED CENTRAL VENOUS ACCESS DEVICE INSERTION  1/11/2022    FOOT AMPUTATION Left 2/10/2020    Procedure: PARTIAL 1ST RAY AMPUTATION;  Surgeon: Lamonte Holter, DPM;  [facility];  Service: Podiatry    INCISION AND DRAINAGE OF WOUND Left 1/12/2020    Procedure: INCISION AND DRAINAGE (I&D) EXTREMITY AND REMOVAL OF SESMOID BONE;  Surgeon: Hawa Phan DPM;  [facility];  Service: Podiatry    IR PICC REPOSITION  1/14/2020    KNEE ARTHROSCOPY Left     KNEE ARTHROSCOPY Right     KNEE SURGERY      MT AMPUTATION TOE,I-P JT Bilateral 5/2/2017    Procedure: PARTIAL AMPUTATION RIGHT AND LEFT HALLUX ;  Surgeon: Lamonte Holter, DPM;  [facility];  Service: Podiatry    MT AMPUTATION TOE,MT-P JT Left 7/25/2017    Procedure: 2ND TOE AMPUTATION;  Surgeon: Lamonte Holter, DPM;  [facility];  Service: Podiatry    MT INSJ TUNNELED CTR VAD W/SUBQ PORT AGE 5 YR/> N/A 1/11/2022    Procedure: INSERTION VENOUS PORT ( PORT-A-CATH) IR;  Surgeon: Nadia Friend DO;  [facility];  Service: Interventional Radiology    RESECTION LOW ANTERIOR LAPAROSCOPIC N/A 10/21/2021    Procedure: RESECTION LOW ANTERIOR LAPAROSCOPIC;  Surgeon: Daniel Bustillo MD;  [facility];  Service: Colorectal    SHOULDER ARTHROSCOPY Left     with screws,RTC    SHOULDER SURGERY      TOE AMPUTATION Left 2020    Procedure: [place];  Surgeon: Godwin Lomas DPM;  [facility];  Service: Podiatry    UPPER GASTROINTESTINAL ENDOSCOPY  2020    Intestinal metaplasia prepyloric    VASECTOMY         Family History   Problem Relation Age of Onset    Heart disease Father         passed away    Hypertension Father     Pulmonary embolism Father     Hypertension Mother         assed away     I have reviewed and agree with the history as documented  E-Cigarette/Vaping    E-Cigarette Use Never User      E-Cigarette/Vaping Substances    Nicotine No     THC No     CBD No     Flavoring No     Other No     Unknown No      Social History     Tobacco Use    Smoking status: Former Smoker     Packs/day: 0 50     Years: 20 00     Pack years: 10 00     Types: Cigarettes     Start date:      Quit date:      Years since quittin 0    Smokeless tobacco: Never Used    Tobacco comment: quit 10 years ago   Vaping Use    Vaping Use: Never used   Substance Use Topics    Alcohol use: Never     Alcohol/week: 0 0 standard drinks    Drug use: No       Review of Systems   Constitutional: Negative for chills, fatigue and fever  HENT: Negative for congestion and sore throat  Eyes: Negative for pain  Respiratory: Positive for shortness of breath  Negative for cough, chest tightness and wheezing  Cardiovascular: Positive for leg swelling  Negative for chest pain and palpitations  Gastrointestinal: Negative for abdominal pain, constipation, diarrhea, nausea and vomiting  Endocrine: Negative for polyuria  Genitourinary: Negative for dysuria  Musculoskeletal: Negative for arthralgias, back pain, myalgias and neck pain  Skin: Negative for rash  Neurological: Negative for dizziness, syncope, light-headedness and headaches     All other systems reviewed and are negative  Physical Exam  Physical Exam  Vitals reviewed  Constitutional:       Appearance: Normal appearance  He is well-developed  HENT:      Head: Normocephalic and atraumatic  Mouth/Throat:      Mouth: Mucous membranes are moist    Eyes:      Conjunctiva/sclera: Conjunctivae normal    Cardiovascular:      Rate and Rhythm: Normal rate and regular rhythm  Heart sounds: Normal heart sounds  Pulmonary:      Effort: Pulmonary effort is normal  Tachypnea present  Breath sounds: Examination of the right-lower field reveals decreased breath sounds  Examination of the left-lower field reveals decreased breath sounds  Decreased breath sounds present  Abdominal:      General: Bowel sounds are normal       Palpations: Abdomen is soft  Tenderness: There is no abdominal tenderness  Musculoskeletal:         General: Normal range of motion  Cervical back: Normal range of motion  Skin:     General: Skin is warm and dry  Capillary Refill: Capillary refill takes less than 2 seconds  Neurological:      General: No focal deficit present  Mental Status: He is alert and oriented to person, place, and time     Psychiatric:         Mood and Affect: Mood normal          Behavior: Behavior normal          Vital Signs  ED Triage Vitals   Temp Pulse Respirations Blood Pressure SpO2   -- 01/21/22 1339 01/21/22 1339 01/21/22 1339 01/21/22 1342    83 (!) 30 135/63 98 %      Temp src Heart Rate Source Patient Position - Orthostatic VS BP Location FiO2 (%)   -- 01/21/22 1430 01/21/22 1430 01/21/22 1530 01/21/22 1342    Monitor Lying Right arm 45      Pain Score       --                  Vitals:    01/21/22 1339 01/21/22 1430 01/21/22 1530 01/21/22 1600   BP: 135/63 126/61 138/64 159/69   Pulse: 83 60 60 63   Patient Position - Orthostatic VS:  Lying Lying Lying       Visual Acuity      ED Medications  Medications   furosemide (LASIX) injection 40 mg (40 mg Intravenous Given 1/21/22 1426) Diagnostic Studies  Results Reviewed     Procedure Component Value Units Date/Time    HS Troponin I 4hr [[id number]]     Lab Status: No result Specimen: Blood     Fingerstick Glucose (POCT) [[id number]]  (Abnormal) Collected: 01/21/22 1547    Lab Status: Final result Updated: 01/21/22 1549     POC Glucose 196 mg/dl     HS Troponin I 2hr [[id number]]     Lab Status: No result Specimen: Blood     HS Troponin 0hr (reflex protocol) [[id number]] Collected: 01/21/22 1359    Lab Status: Final result Specimen: Blood from Line Updated: 01/21/22 1502     hs TnI 0hr 53 ng/L     B-Type Natriuretic Peptide(BNP) CA, GH, EA Camp[facility] Only [[id number]]  (Abnormal) Collected: 01/21/22 1359    Lab Status: Final result Specimen: Blood from Line Updated: 01/21/22 1450     BNP 1,067 pg/mL     Comprehensive metabolic panel [[id number]]  (Abnormal) Collected: 01/21/22 1359    Lab Status: Final result Specimen: Blood from Line Updated: 01/21/22 1436     Sodium 131 mmol/L      Potassium 5 4 mmol/L      Chloride 97 mmol/L      CO2 14 mmol/L      ANION GAP 20 mmol/L      BUN 98 mg/dL      Creatinine 5 53 mg/dL      Glucose 193 mg/dL      Calcium 9 1 mg/dL      AST 37 U/L      ALT 37 U/L      Alkaline Phosphatase 50 U/L      Total Protein 6 7 g/dL      Albumin 3 9 g/dL      Total Bilirubin 1 14 mg/dL      eGFR 9 ml/min/1 73sq m     Narrative:      Von guidelines for Chronic Kidney Disease (CKD):     Stage 1 with normal or high GFR (GFR > 90 mL/min/1 73 square meters)    Stage 2 Mild CKD (GFR = 60-89 mL/min/1 73 square meters)    Stage 3A Moderate CKD (GFR = 45-59 mL/min/1 73 square meters)    Stage 3B Moderate CKD (GFR = 30-44 mL/min/1 73 square meters)    Stage 4 Severe CKD (GFR = 15-29 mL/min/1 73 square meters)    Stage 5 End Stage CKD (GFR <15 mL/min/1 73 square meters)  Note: GFR calculation is accurate only with a steady state creatinine    Protime-INR [[id number]]  (Abnormal) Collected: 01/21/22 0741 Lab Status: Final result Specimen: Blood from Line Updated: 01/21/22 1424     Protime 20 3 seconds      INR 1 77    APTT [[id number]]  (Normal) Collected: 01/21/22 1359    Lab Status: Final result Specimen: Blood from Line Updated: 01/21/22 1424     PTT 31 seconds     CBC and differential [[id number]]  (Abnormal) Collected: 01/21/22 1359    Lab Status: Final result Specimen: Blood from Line Updated: 01/21/22 1409     WBC 13 32 Thousand/uL      RBC 3 97 Million/uL      Hemoglobin 12 2 g/dL      Hematocrit 37 2 %      MCV 94 fL      MCH 30 7 pg      MCHC 32 8 g/dL      RDW 16 9 %      MPV 9 7 fL      Platelets 913 Thousands/uL      nRBC 0 /100 WBCs      Neutrophils Relative 88 %      Immat GRANS % 1 %      Lymphocytes Relative 7 %      Monocytes Relative 4 %      Eosinophils Relative 0 %      Basophils Relative 0 %      Neutrophils Absolute 11 82 Thousands/µL      Immature Grans Absolute 0 06 Thousand/uL      Lymphocytes Absolute 0 92 Thousands/µL      Monocytes Absolute 0 50 Thousand/µL      Eosinophils Absolute 0 00 Thousand/µL      Basophils Absolute 0 02 Thousands/µL                  XR chest 1 view portable   Final Result by Miguel Palafox MD (01/21 1436)   Persistent or recurrent pulmonary vascular congestion      Persistent cardiomegaly                     Workstation performed: [id number]         IR port check    (Results Pending)              Procedures  ECG 12 Lead Documentation Only    Date/Time: 1/21/2022 3:51 PM  Performed by: Americo Amin PA-C  Authorized by:  Americo Amin PA-C     ECG reviewed by me, the ED Provider: yes    Patient location:  ED  Previous ECG:     Previous ECG:  Compared to current    Similarity:  No change    Comparison to cardiac monitor: Yes    Interpretation:     Interpretation: normal    Rate:     ECG rate:  64    ECG rate assessment: normal    Rhythm:     Rhythm: paced    Ectopy:     Ectopy: none    QRS:     QRS axis:  Normal  Conduction:     Conduction: normal    ST segments:     ST segments:  Normal  T waves:     T waves: normal    Comments:      No evidence of acute cardiac ischemia             ED Course  ED Course as of 01/21/22 1614   Fri Jan 21, 2022   1429 Discussed with Dr Stan Gonzalez of nephro  States Lasix are safe to give  If no urine output after 60 minutes, give another 40mg IV    1453 Creatinine(!): 5 53  Result noted  Discussed with Dr Neyda Richard  Recommends patient receive another 40mg of IV Lasix                                             [facility]  Number of Diagnoses or Management Options  ELZBIETA (acute kidney injury) ([facility] )  Dyspnea  Diagnosis management comments: Patient presented short of breath after receiving IV fluids at outpatient infusion center  Patient clinically fluid overloaded  Elevated BNP  Decrease in renal function noted  Discussed case with Nephrology  Will admit patient to medicine  Patient agreeable to plan  Disposition  Final diagnoses:   ELZBIETA (acute kidney injury) ([facility] )   Dyspnea     Time reflects when diagnosis was documented in both MDM as applicable and the Disposition within this note     Time User Action Codes Description Comment    1/21/2022  2:55 PM Irasema Calderon Add [N17 9] ELZBIETA (acute kidney injury) ([facility] )     1/21/2022  2:55 PM Irasema Calderon Add [R06 00] Dyspnea     1/21/2022  3:52 PM Release User, Automatic Add [S81 [id number]] Port-A-Cath in place       ED Disposition     ED Disposition Condition Date/Time Comment    Admit Stable Fri Jan 21, 2022  2:55 PM Case was discussed with Tessa Guerrero and the patient's admission status was agreed to be Admission Status: inpatient status to the service of Dr Campo  Follow-up Information    None         Patient's Medications   Discharge Prescriptions    No medications on file       No discharge procedures on file      PDMP Review       Value Time User    PDMP Reviewed  Yes 1/31/2020  1:53 PM Casey Bowser DO          ED Provider  Electronically Signed by Mendel Mallick, PA-C  01/21/22 8628

## 2022-01-21 NOTE — TELEPHONE ENCOUNTER
Received a phone call from Anmol Gonzalez, RN at [facility] inf  Pt became SOB and winded during IV hydration and his oxygen saturation was 93% on Room air, oxygen applied  Pt still with SOB will go to ED  Spoke to wife Carl Roth who was tearful and does not think he will be able to do treatment  She was asking if there is any other treatment  I will speak to Dr Consuelo Degroot and get back to her

## 2022-01-22 ENCOUNTER — APPOINTMENT (INPATIENT)
Dept: RADIOLOGY | Facility: HOSPITAL | Age: 67
DRG: 682 | End: 2022-01-22
Payer: MEDICARE

## 2022-01-22 PROBLEM — I50.33 ACUTE ON CHRONIC DIASTOLIC CONGESTIVE HEART FAILURE (HCC): Status: ACTIVE | Noted: 2022-01-01

## 2022-01-22 LAB
ALBUMIN SERPL BCP-MCNC: 3.9 G/DL (ref 3.5–5)
ALP SERPL-CCNC: 48 U/L (ref 34–104)
ALT SERPL W P-5'-P-CCNC: 163 U/L (ref 7–52)
ANION GAP SERPL CALCULATED.3IONS-SCNC: 15 MMOL/L (ref 4–13)
ANION GAP SERPL CALCULATED.3IONS-SCNC: 15 MMOL/L (ref 4–13)
ANION GAP SERPL CALCULATED.3IONS-SCNC: 17 MMOL/L (ref 4–13)
ANISOCYTOSIS BLD QL SMEAR: PRESENT
AST SERPL W P-5'-P-CCNC: 148 U/L (ref 13–39)
BACTERIA UR QL AUTO: ABNORMAL /HPF
BASOPHILS # BLD MANUAL: 0 THOUSAND/UL (ref 0–0.1)
BASOPHILS NFR MAR MANUAL: 0 % (ref 0–1)
BILIRUB SERPL-MCNC: 1.1 MG/DL (ref 0.2–1)
BILIRUB UR QL STRIP: NEGATIVE
BUN SERPL-MCNC: 112 MG/DL (ref 5–25)
BUN SERPL-MCNC: 118 MG/DL (ref 5–25)
BUN SERPL-MCNC: 118 MG/DL (ref 5–25)
CALCIUM SERPL-MCNC: 9.3 MG/DL (ref 8.4–10.2)
CALCIUM SERPL-MCNC: 9.3 MG/DL (ref 8.4–10.2)
CALCIUM SERPL-MCNC: 9.4 MG/DL (ref 8.4–10.2)
CHLORIDE SERPL-SCNC: 94 MMOL/L (ref 96–108)
CHLORIDE SERPL-SCNC: 94 MMOL/L (ref 96–108)
CHLORIDE SERPL-SCNC: 95 MMOL/L (ref 96–108)
CK SERPL-CCNC: 65 U/L (ref 39–308)
CLARITY UR: CLEAR
CO2 SERPL-SCNC: 17 MMOL/L (ref 21–32)
COLOR UR: YELLOW
CREAT SERPL-MCNC: 5.89 MG/DL (ref 0.6–1.3)
CREAT SERPL-MCNC: 5.94 MG/DL (ref 0.6–1.3)
CREAT SERPL-MCNC: 5.96 MG/DL (ref 0.6–1.3)
EOSINOPHIL # BLD MANUAL: 0 THOUSAND/UL (ref 0–0.4)
EOSINOPHIL NFR BLD MANUAL: 0 % (ref 0–6)
ERYTHROCYTE [DISTWIDTH] IN BLOOD BY AUTOMATED COUNT: 16.4 % (ref 11.6–15.1)
GFR SERPL CREATININE-BSD FRML MDRD: 8 ML/MIN/1.73SQ M
GFR SERPL CREATININE-BSD FRML MDRD: 8 ML/MIN/1.73SQ M
GFR SERPL CREATININE-BSD FRML MDRD: 9 ML/MIN/1.73SQ M
GLUCOSE SERPL-MCNC: 121 MG/DL (ref 65–140)
GLUCOSE SERPL-MCNC: 144 MG/DL (ref 65–140)
GLUCOSE SERPL-MCNC: 151 MG/DL (ref 65–140)
GLUCOSE SERPL-MCNC: 153 MG/DL (ref 65–140)
GLUCOSE SERPL-MCNC: 166 MG/DL (ref 65–140)
GLUCOSE SERPL-MCNC: 167 MG/DL (ref 65–140)
GLUCOSE SERPL-MCNC: 173 MG/DL (ref 65–140)
GLUCOSE UR STRIP-MCNC: NEGATIVE MG/DL
HCT VFR BLD AUTO: 34.8 % (ref 36.5–49.3)
HGB BLD-MCNC: 12 G/DL (ref 12–17)
HGB UR QL STRIP.AUTO: ABNORMAL
HYALINE CASTS #/AREA URNS LPF: ABNORMAL /LPF
KETONES UR STRIP-MCNC: NEGATIVE MG/DL
LACTATE SERPL-SCNC: 1.2 MMOL/L (ref 0.5–2)
LEUKOCYTE ESTERASE UR QL STRIP: NEGATIVE
LYMPHOCYTES # BLD AUTO: 1.74 THOUSAND/UL (ref 0.6–4.47)
LYMPHOCYTES # BLD AUTO: 14 % (ref 14–44)
MAGNESIUM SERPL-MCNC: 2.4 MG/DL (ref 1.9–2.7)
MCH RBC QN AUTO: 30.6 PG (ref 26.8–34.3)
MCHC RBC AUTO-ENTMCNC: 34.5 G/DL (ref 31.4–37.4)
MCV RBC AUTO: 89 FL (ref 82–98)
MONOCYTES # BLD AUTO: 0.25 THOUSAND/UL (ref 0–1.22)
MONOCYTES NFR BLD: 2 % (ref 4–12)
MUCOUS THREADS UR QL AUTO: ABNORMAL
NEUTROPHILS # BLD MANUAL: 10.46 THOUSAND/UL (ref 1.85–7.62)
NEUTS SEG NFR BLD AUTO: 84 % (ref 43–75)
NITRITE UR QL STRIP: NEGATIVE
NON-SQ EPI CELLS URNS QL MICRO: ABNORMAL /HPF
OVALOCYTES BLD QL SMEAR: PRESENT
PH UR STRIP.AUTO: 5.5 [PH]
PHOSPHATE SERPL-MCNC: 9.1 MG/DL (ref 2.3–4.1)
PLATELET # BLD AUTO: 266 THOUSANDS/UL (ref 149–390)
PLATELET BLD QL SMEAR: ADEQUATE
PMV BLD AUTO: 9.4 FL (ref 8.9–12.7)
POIKILOCYTOSIS BLD QL SMEAR: PRESENT
POTASSIUM SERPL-SCNC: 5 MMOL/L (ref 3.5–5.3)
POTASSIUM SERPL-SCNC: 5.6 MMOL/L (ref 3.5–5.3)
POTASSIUM SERPL-SCNC: 6 MMOL/L (ref 3.5–5.3)
PROT SERPL-MCNC: 6.4 G/DL (ref 6.4–8.4)
PROT UR STRIP-MCNC: ABNORMAL MG/DL
RBC # BLD AUTO: 3.92 MILLION/UL (ref 3.88–5.62)
RBC #/AREA URNS AUTO: ABNORMAL /HPF
RBC MORPH BLD: PRESENT
SCHISTOCYTES BLD QL SMEAR: PRESENT
SODIUM SERPL-SCNC: 126 MMOL/L (ref 135–147)
SODIUM SERPL-SCNC: 127 MMOL/L (ref 135–147)
SODIUM SERPL-SCNC: 128 MMOL/L (ref 135–147)
SP GR UR STRIP.AUTO: >=1.03 (ref 1–1.03)
UROBILINOGEN UR QL STRIP.AUTO: 0.2 E.U./DL
WBC # BLD AUTO: 12.45 THOUSAND/UL (ref 4.31–10.16)
WBC #/AREA URNS AUTO: ABNORMAL /HPF

## 2022-01-22 PROCEDURE — 99233 SBSQ HOSP IP/OBS HIGH 50: CPT | Performed by: NURSE PRACTITIONER

## 2022-01-22 PROCEDURE — 36415 COLL VENOUS BLD VENIPUNCTURE: CPT | Performed by: NURSE PRACTITIONER

## 2022-01-22 PROCEDURE — 99223 1ST HOSP IP/OBS HIGH 75: CPT | Performed by: NURSE PRACTITIONER

## 2022-01-22 PROCEDURE — 82550 ASSAY OF CK (CPK): CPT | Performed by: NURSE PRACTITIONER

## 2022-01-22 PROCEDURE — 85007 BL SMEAR W/DIFF WBC COUNT: CPT | Performed by: NURSE PRACTITIONER

## 2022-01-22 PROCEDURE — 71045 X-RAY EXAM CHEST 1 VIEW: CPT

## 2022-01-22 PROCEDURE — 82948 REAGENT STRIP/BLOOD GLUCOSE: CPT

## 2022-01-22 PROCEDURE — 87081 CULTURE SCREEN ONLY: CPT | Performed by: INTERNAL MEDICINE

## 2022-01-22 PROCEDURE — 80053 COMPREHEN METABOLIC PANEL: CPT | Performed by: NURSE PRACTITIONER

## 2022-01-22 PROCEDURE — 84300 ASSAY OF URINE SODIUM: CPT | Performed by: NURSE PRACTITIONER

## 2022-01-22 PROCEDURE — 83605 ASSAY OF LACTIC ACID: CPT | Performed by: NURSE PRACTITIONER

## 2022-01-22 PROCEDURE — NC001 PR NO CHARGE: Performed by: NURSE PRACTITIONER

## 2022-01-22 PROCEDURE — 85027 COMPLETE CBC AUTOMATED: CPT | Performed by: NURSE PRACTITIONER

## 2022-01-22 PROCEDURE — 80048 BASIC METABOLIC PNL TOTAL CA: CPT | Performed by: NURSE PRACTITIONER

## 2022-01-22 PROCEDURE — 84100 ASSAY OF PHOSPHORUS: CPT | Performed by: NURSE PRACTITIONER

## 2022-01-22 PROCEDURE — 81001 URINALYSIS AUTO W/SCOPE: CPT | Performed by: NURSE PRACTITIONER

## 2022-01-22 PROCEDURE — 94760 N-INVAS EAR/PLS OXIMETRY 1: CPT

## 2022-01-22 PROCEDURE — 02HV33Z INSERTION OF INFUSION DEVICE INTO SUPERIOR VENA CAVA, PERCUTANEOUS APPROACH: ICD-10-PCS | Performed by: NURSE PRACTITIONER

## 2022-01-22 PROCEDURE — 82570 ASSAY OF URINE CREATININE: CPT | Performed by: NURSE PRACTITIONER

## 2022-01-22 PROCEDURE — 83735 ASSAY OF MAGNESIUM: CPT | Performed by: NURSE PRACTITIONER

## 2022-01-22 PROCEDURE — 36556 INSERT NON-TUNNEL CV CATH: CPT | Performed by: NURSE PRACTITIONER

## 2022-01-22 RX ORDER — BUMETANIDE 0.25 MG/ML
2 INJECTION, SOLUTION INTRAMUSCULAR; INTRAVENOUS ONCE
Status: DISCONTINUED | OUTPATIENT
Start: 2022-01-22 | End: 2022-01-22

## 2022-01-22 RX ORDER — DEXTROSE MONOHYDRATE 25 G/50ML
25 INJECTION, SOLUTION INTRAVENOUS ONCE
Status: COMPLETED | OUTPATIENT
Start: 2022-01-22 | End: 2022-01-22

## 2022-01-22 RX ORDER — SODIUM BICARBONATE 650 MG/1
1300 TABLET ORAL
Status: DISCONTINUED | OUTPATIENT
Start: 2022-01-22 | End: 2022-01-23

## 2022-01-22 RX ORDER — BUMETANIDE 0.25 MG/ML
4 INJECTION, SOLUTION INTRAMUSCULAR; INTRAVENOUS ONCE
Status: COMPLETED | OUTPATIENT
Start: 2022-01-22 | End: 2022-01-22

## 2022-01-22 RX ORDER — CALCIUM GLUCONATE 20 MG/ML
1 INJECTION, SOLUTION INTRAVENOUS ONCE
Status: COMPLETED | OUTPATIENT
Start: 2022-01-22 | End: 2022-01-22

## 2022-01-22 RX ORDER — ONDANSETRON 2 MG/ML
4 INJECTION INTRAMUSCULAR; INTRAVENOUS EVERY 4 HOURS PRN
Status: DISCONTINUED | OUTPATIENT
Start: 2022-01-22 | End: 2022-01-28 | Stop reason: HOSPADM

## 2022-01-22 RX ORDER — BUMETANIDE 0.25 MG/ML
2 INJECTION, SOLUTION INTRAMUSCULAR; INTRAVENOUS ONCE
Status: COMPLETED | OUTPATIENT
Start: 2022-01-22 | End: 2022-01-22

## 2022-01-22 RX ORDER — SODIUM POLYSTYRENE SULFONATE 4.1 MEQ/G
15 POWDER, FOR SUSPENSION ORAL; RECTAL ONCE
Status: COMPLETED | OUTPATIENT
Start: 2022-01-22 | End: 2022-01-22

## 2022-01-22 RX ADMIN — APIXABAN 5 MG: 5 TABLET, FILM COATED ORAL at 06:32

## 2022-01-22 RX ADMIN — METOPROLOL TARTRATE 50 MG: 50 TABLET, FILM COATED ORAL at 23:44

## 2022-01-22 RX ADMIN — SODIUM BICARBONATE 50 MEQ: 84 INJECTION INTRAVENOUS at 16:53

## 2022-01-22 RX ADMIN — INSULIN HUMAN 10 UNITS: 100 INJECTION, SOLUTION PARENTERAL at 16:54

## 2022-01-22 RX ADMIN — DEXTROSE MONOHYDRATE 25 ML: 25 INJECTION, SOLUTION INTRAVENOUS at 16:54

## 2022-01-22 RX ADMIN — ONDANSETRON 4 MG: 2 INJECTION INTRAMUSCULAR; INTRAVENOUS at 11:18

## 2022-01-22 RX ADMIN — APIXABAN 5 MG: 5 TABLET, FILM COATED ORAL at 20:04

## 2022-01-22 RX ADMIN — INSULIN LISPRO 1 UNITS: 100 INJECTION, SOLUTION INTRAVENOUS; SUBCUTANEOUS at 06:53

## 2022-01-22 RX ADMIN — CALCIUM GLUCONATE 1 G: 20 INJECTION, SOLUTION INTRAVENOUS at 16:55

## 2022-01-22 RX ADMIN — BUMETANIDE 4 MG: 0.25 INJECTION INTRAMUSCULAR; INTRAVENOUS at 20:33

## 2022-01-22 RX ADMIN — METOPROLOL TARTRATE 50 MG: 50 TABLET, FILM COATED ORAL at 06:31

## 2022-01-22 RX ADMIN — ONDANSETRON 4 MG: 2 INJECTION INTRAMUSCULAR; INTRAVENOUS at 23:22

## 2022-01-22 RX ADMIN — BUMETANIDE 2 MG: 0.25 INJECTION, SOLUTION INTRAMUSCULAR; INTRAVENOUS at 16:54

## 2022-01-22 RX ADMIN — SODIUM POLYSTYRENE SULFONATE 15 G: 1 POWDER ORAL; RECTAL at 17:19

## 2022-01-22 RX ADMIN — ONDANSETRON 4 MG: 2 INJECTION INTRAMUSCULAR; INTRAVENOUS at 18:47

## 2022-01-22 NOTE — CONSULTS
CONSULTATION-NEPHROLOGY   Guillermina Buckley 74 y o  male MRN: [id number]  Unit/Bed#: ED 27 Encounter: [id number]        Assessment and Plan:    1  Oliguric acute kidney injury (POA) chronic kidney disease  · Patient has friable renal function in the setting of recent acute kidney injury  Now presents with a creatinine of 5 9 mg/dL today  There are multiple issues  Recently initiated on LCV/5 FU and did not tolerate with profound nausea, volume depletion requiring volume expansion now with suspected pulmonary edema  · Repeat lab significant for worsening hyperkalemia, acidosis  · Will treat with 2 mg IV Bumex now and hyperkalemia protocol  · Bladder scan x1  · Check ultrasound kidneys bladder  · I consented the patient for hemodialysis and will tentatively plan on 2 hour treatment tomorrow if unable to medically manage  · Critical care consulted for line  Of note, has right chest wall Port-A-Cath  · Attempted to call wife x2 to update-voicemail left  2  Stage 4 chronic kidney disease with most recent baseline creatinine around 2 6 mg/dL  · Follows with Dr Myriam Ren  Vascular planning in place for eventual hemodialysis  3  Adenocarcinoma of the colon on LCV/5FU by primary oncologist  4  Elevated anion gap metabolic acidosis  · Likely due to uremic acidosis  Lactic acid normal   Due to volume status, will attempt to avoid IV sodium bicarbonate and placed on high-dose sodium bicarbonate tablets  If no improvement tomorrow, would be definitively treated with hemodialysis  5  Hyperkalemia  · Specimen is slightly hemolyzed but persistent  Continue low-potassium diet  Treat with hyperkalemia protocol Bumex 2 mg x 1   6  Pulmonary edema  · Repeated chest x-ray looks unchanged  Treat with 2 mg IV Bumex, check BMP later  Will likely require more aggressive diuresis however this may be to deterioration of renal function  Tentative for hemodialysis tomorrow  7   Hyponatremia  · Suspected hypervolemic hyponatremia will brooke as above  8  Hyperphosphatemia  · Oral intake is poor, will hold off on binder today  9  Suspected diabetic nephropathy  · Not a candidate for Ace or Arb        HPI:    Vonnie Caldwell is a 74 y o  male with an active problem list significant for CKD stage 4 with nephrotic syndrome, suspected diabetic nephropathy, hypertension, left renal artery stenosis, chronic diastolic congestive heart failure, history of left foot osteomyelitis status post amputation, chronic metabolic acidosis, recently diagnosed with colon cancer receiving chemotherapy on LCV/5FU  Had his 1st chemotherapy session on Tuesday and had significant tolerance in the form of profound nausea  Unable to eat  He was referred to the infusion center for volume expansion and Zofran  At infusion center 1/21 he complained dyspnea requiring nasal cannula  He was referred to the ER for further evaluation  Upon arrival he received 1 L saline  A chest x-ray was significant for pulmonary edema  He received IV Lasix  Blood work today revealed acute kidney injury, severe azotemia, elevated anion gap metabolic acidosis, hyponatremia, hyperkalemia for which a renal consultation was placed  HPI is obtained from the patient and wife  Patient's wife states that the patient did not tolerate chemotherapy at all  He ended up with profound nausea and needed infusions  Patient states he has some dyspnea and feels a bit swollen but otherwise feels okay  He has not eaten in 5 days  He denies dizziness, chest pain, dysuria, urgency, frequency  From renal standpoint, follows with Dr Manjula Montano for CKD stage 4  Most recent baseline creatinine improved to 2 6 mg/dL above previously 3 since renal stones mg/dL  He has suspected diabetic nephropathy and hypertensive nephrosclerosis  He has also had severe recent acute kidney injury attributable to contrast induced nephropathy late last year    He is actually being evaluated for permanent access and hemodialysis planning as an outpatient  Reason for Consult:  Acute kidney injury, electrolyte derangements    Review of Systems:  A complete 10-point review of systems was performed  Aside from what was mentioned in the HPI, it is otherwise negative        Historical Information   Past Medical History:   Diagnosis Date    Anemia     iron def    Arthritis     OA    Bruise of both arms     forearms and both hands    Bruises easily     Cancer ([facility] ) 09/01/2021    colon    [facility]     Chronic kidney disease     CPAP (continuous positive airway pressure) dependence     Diabetes mellitus [facility] )     Diabetic foot ulcer ([facility] )     Eczema     Erectile dysfunction     Gout     Heart murmur     History of permanent cardiac pacemaker placement 11/2018    Hyperlipidemia     Hypertension     Hypoglycemia 3/8/2019    Murmur     Nephropathy     Osteoarthritis     Pacemaker 11/06/2018    PAD [facility]peripheral artery disease) ([facility]     Seasonal allergies     Sleep apnea     Toe infection     bilat great toes    Vertigo     Walks frequently     Wears dentures     upper    Wears glasses      Past Surgical History:   Procedure Laterality Date    CARDIAC PACEMAKER PLACEMENT      CARPAL TUNNEL RELEASE Left     CARPAL TUNNEL RELEASE Right     CARPAL TUNNEL RELEASE      COLONOSCOPY  08/2020    COLONOSCOPY      FL GUIDED CENTRAL VENOUS ACCESS DEVICE INSERTION  1/11/2022    FOOT AMPUTATION Left 2/10/2020    Procedure: PARTIAL 1ST RAY AMPUTATION;  Surgeon: Ana Cristina Christianson DPM;  [facility];  Service: Podiatry    INCISION AND DRAINAGE OF WOUND Left 1/12/2020    Procedure: INCISION AND DRAINAGE (I&D) EXTREMITY AND REMOVAL OF SESMOID BONE;  Surgeon: Den Grey DPM;  [facility];  Service: Podiatry    IR PICC REPOSITION  1/14/2020    KNEE ARTHROSCOPY Left     KNEE ARTHROSCOPY Right     KNEE SURGERY      LA AMPUTATION TOE,I-P JT Bilateral 5/2/2017    Procedure: PARTIAL AMPUTATION RIGHT AND LEFT HALLUX ;  Surgeon: Chao Velazquez DPM;  [facility];  Service: Podiatry    GA AMPUTATION TOE,MT-P JT Left 2017    Procedure: 2ND TOE AMPUTATION;  Surgeon: Chao Velazquez DPM;  [facility];  Service: Podiatry    GA INSJ TUNNELED CTR VAD W/SUBQ PORT AGE 5 YR/> N/A 2022    Procedure: INSERTION VENOUS PORT ( PORT-A-CATH) IR;  Surgeon: Shannon Pelayo DO;  [facility];  Service: Interventional Radiology    RESECTION LOW ANTERIOR LAPAROSCOPIC N/A 10/21/2021    Procedure: RESECTION LOW ANTERIOR LAPAROSCOPIC;  Surgeon: Junaid Mclean MD;  [facility];  Service: Colorectal    SHOULDER ARTHROSCOPY Left     with screws,RTC    SHOULDER SURGERY      TOE AMPUTATION Left 2020    Procedure: Meron Matias;  Surgeon: Peyton Garcia DPM;  [facility];  Service: Podiatry    UPPER GASTROINTESTINAL ENDOSCOPY  2020    Intestinal metaplasia prepyloric    VASECTOMY       Social History   Social History     Substance and Sexual Activity   Alcohol Use Never    Alcohol/week: 0 0 standard drinks     Social History     Substance and Sexual Activity   Drug Use No     Social History     Tobacco Use   Smoking Status Former Smoker    Packs/day: 0 50    Years: 20 00    Pack years: 10 00    Types: Cigarettes    Start date: 1    Quit date:     Years since quittin 0   Smokeless Tobacco Never Used   Tobacco Comment    quit 10 years ago       Family History:   Family History   Problem Relation Age of Onset    Heart disease Father         passed away    Hypertension Father     Pulmonary embolism Father     Hypertension Mother         assed away       Medications:  Pertinent medications were reviewed  Current Facility-Administered Medications   Medication Dose Route Frequency Provider Last Rate    amLODIPine  10 mg Oral Daily PAT Davis      apixaban  5 mg Oral Q12H PAT Davis      bumetanide  2 mg Intravenous Once Denise Lopez, CRNP      calcium gluconate  1 g Intravenous Once Denise Lopez, CRNP      dextrose  25 mL Intravenous Once Denise Lopez, CRNP      famotidine  20 mg Oral Daily Betty Mckenzie, CRNP      fish oil  1,000 mg Oral BID Betty Mckenzie, CRNP      insulin lispro  1-6 Units Subcutaneous TID AC Betty Tavaresop, CRNP      insulin lispro  1-6 Units Subcutaneous HS Betty Mckenzie, CRNP      insulin regular  10 Units Intravenous Once Denise Lopez, CRNP      metoprolol tartrate  50 mg Oral Q12H Betty Mckenzie, CRNP      ondansetron  4 mg Intravenous Q8H PRN Betty Mckenzie, CRNP      sodium bicarbonate  50 mEq Intravenous Once Denise Lopez, ODALISNP      sodium polystyrene  15 g Oral Once Denise Lopez, ODALISNP      tamsulosin  0 4 mg Oral Daily With Dinner Betty Mckenzie, CRNP           Allergies   Allergen Reactions    Invokana [Canagliflozin] Other (See Comments)     Caused circulation problems    Lamisil [Terbinafine] Blisters     Wife states " His skin peeled from head to toe"    Other Swelling     Pomegranate - facial swelling, no swelling of tongue, esophagus  Adhesive tape   Latex Rash         Vitals:   /65   Pulse 60   Temp (!) 97 4 °F (36 3 °C) (Oral)   Resp 18   SpO2 94%   There is no height or weight on file to calculate BMI  SpO2: 94 %,   SpO2 Activity: At Rest,   O2 Device: Nasal cannula      Intake/Output Summary (Last 24 hours) at 1/22/2022 1638  Last data filed at 1/22/2022 1453  Gross per 24 hour   Intake 960 ml   Output 100 ml   Net 860 ml     Invasive Devices  Report    Central Venous Catheter Line            Port A Cath Right Subclavian -- days          Peripheral Intravenous Line            Peripheral IV 01/22/22 Right Arm <1 day          Drain            Urethral Catheter Straight-tip 18 Fr   88 days                Physical Exam:  General: conscious, cooperative, in no acute distress  Eyes: conjunctivae pink, anicteric sclerae  ENT: lips and mucous membranes moist  Neck: supple, no JVD, no masses  Chest: diminished breath sounds bilateral, no crackles, ronchus or wheezings  RCW port with oozing, on nasal cannula   CVS: S1 & S2, normal rate, regular rhythm  Abdomen: soft, non-tender, non-distended, normoactive bowel sounds  Extremities: trace to mild edema of both legs  Skin: no rash  Neuro: awake, alert, oriented      Diagnostic Data:  Lab: I have personally reviewed pertinent lab results  ,   CBC:  Results from last 7 days   Lab Units 01/22/22  0646   WBC Thousand/uL 12 45*   HEMOGLOBIN g/dL 12 0   HEMATOCRIT % 34 8*   PLATELETS Thousands/uL 266      CMP:   Lab Results   Component Value Date    SODIUM 126 (L) 01/22/2022    K 6 0 (H) 01/22/2022    CL 94 (L) 01/22/2022    CO2 17 (L) 01/22/2022     (H) 01/22/2022    CREATININE 5 89 (H) 01/22/2022    CALCIUM 9 4 01/22/2022     (H) 01/22/2022     (H) 01/22/2022    ALKPHOS 48 01/22/2022    EGFR 9 01/22/2022   ,   PT/INR: No results found for: PT, INR,   Magnesium: No components found for: MAG,  Phosphorous:   Lab Results   Component Value Date    PHOS 9 1 (H) 01/22/2022       Microbiology:  @[email or web address],(urinecx:7)@        PAT Hwang    Portions of the record may have been created with voice recognition software  Occasional wrong word or "sound a like" substitutions may have occurred due to the inherent limitations of voice recognition software  Read the chart carefully and recognize, using context, where substitutions have occurred

## 2022-01-22 NOTE — ASSESSMENT & PLAN NOTE
Lab Results   Component Value Date    HGBA1C 5 7 (H) 12/14/2021       Recent Labs     01/21/22  1826 01/21/22  2229 01/22/22  0652 01/22/22  1101   POCGLU 177* 222* 173* 144*       Blood Sugar Average: Last 72 hrs:  (P) 182 4     · Follows up with Podiatry  · Wound care consult

## 2022-01-22 NOTE — ASSESSMENT & PLAN NOTE
· Adenocarcinoma 01/08/2021 currently being treated with chemotherapy at [facility] center  · Continue outpatient follow-up after resolution of acute issue

## 2022-01-22 NOTE — ASSESSMENT & PLAN NOTE
· Presented for shortness of breath while receiving fluids at infusion center due to nausea from chemotherapy  · He is on 4 L of oxygen chronically  · He was placed on CPAP by EMS prior to arrival for increased work of breathing  · EKG:  Paced rhythm  · BNP: 1067  · Chest x-ray: Persistent or recurrent pulmonary vascular congestion persistent cardiomegaly   · He received 40 mg of IV Lasix and is diuresing  · Per nephrology Bumex 2 mg IV  · Daily weights and I&Os  · Cardiology consult

## 2022-01-22 NOTE — RESPIRATORY THERAPY NOTE
01/21/22 2336   Non-Invasive Information   O2 Interface Device Full face mask   Non-Invasive Ventilation Mode CPAP   $ Intermittent NIV Yes   SpO2 95 %   $ Pulse Oximetry Spot Check Charge Completed   Non-Invasive Settings   FiO2 (%)   (4L)   PEEP/CPAP (cm H2O) 11   Non-Invasive Readings   Skin Intervention Skin intact   Total Rate 22   Spontaneous Vt (mL) 791   Spontaneous MV (mL) 17 9   I/E Ratio (Obs) 1:2 1   Leak (lpm) 30   Non-Invasive Alarms   Apnea Interval (sec) 30

## 2022-01-22 NOTE — RESPIRATORY THERAPY NOTE
01/22/22 0103   Respiratory Assessment   Resp Comments CPAP placed on SB  Patient feeling nauseous at this time  Placed back on 4L NC     Non-Invasive Information   SpO2 (!) 88 %

## 2022-01-22 NOTE — PLAN OF CARE
Problem: Nutrition/Hydration-ADULT  Goal: Nutrient/Hydration intake appropriate for improving, restoring or maintaining nutritional needs  Description: Monitor and assess patient's nutrition/hydration status for malnutrition  Collaborate with interdisciplinary team and initiate plan and interventions as ordered  Monitor patient's weight and dietary intake as ordered or per policy  Utilize nutrition screening tool and intervene as necessary  Determine patient's food preferences and provide high-protein, high-caloric foods as appropriate       INTERVENTIONS:  - Monitor oral intake, urinary output, labs, and treatment plans  - Assess nutrition and hydration status and recommend course of action  - Evaluate amount of meals eaten  - Assist patient with eating if necessary   - Allow adequate time for meals  - Recommend/ encourage appropriate diets, oral nutritional supplements, and vitamin/mineral supplements  - Order, calculate, and assess calorie counts as needed  - Recommend, monitor, and adjust tube feedings and TPN/PPN based on assessed needs  - Assess need for intravenous fluids  - Provide specific nutrition/hydration education as appropriate  - Include patient/family/caregiver in decisions related to nutrition  Outcome: Progressing     Problem: RESPIRATORY - ADULT  Goal: Achieves optimal ventilation and oxygenation  Description: INTERVENTIONS:  - Assess for changes in respiratory status  - Assess for changes in mentation and behavior  - Position to facilitate oxygenation and minimize respiratory effort  - Oxygen administered by appropriate delivery if ordered  - Initiate smoking cessation education as indicated  - Encourage broncho-pulmonary hygiene including cough, deep breathe, Incentive Spirometry  - Assess the need for suctioning and aspirate as needed  - Assess and instruct to report SOB or any respiratory difficulty  - Respiratory Therapy support as indicated  Outcome: Progressing     Problem: GASTROINTESTINAL - ADULT  Goal: Minimal or absence of nausea and/or vomiting  Description: INTERVENTIONS:  - Administer IV fluids if ordered to ensure adequate hydration  - Maintain NPO status until nausea and vomiting are resolved  - Nasogastric tube if ordered  - Administer ordered antiemetic medications as needed  - Provide nonpharmacologic comfort measures as appropriate  - Advance diet as tolerated, if ordered  - Consider nutrition services referral to assist patient with adequate nutrition and appropriate food choices  Outcome: Progressing  Goal: Maintains or returns to baseline bowel function  Description: INTERVENTIONS:  - Assess bowel function  - Encourage oral fluids to ensure adequate hydration  - Administer IV fluids if ordered to ensure adequate hydration  - Administer ordered medications as needed  - Encourage mobilization and activity  - Consider nutritional services referral to assist patient with adequate nutrition and appropriate food choices  Outcome: Progressing  Goal: Maintains adequate nutritional intake  Description: INTERVENTIONS:  - Monitor percentage of each meal consumed  - Identify factors contributing to decreased intake, treat as appropriate  - Assist with meals as needed  - Monitor I&O, weight, and lab values if indicated  - Obtain nutrition services referral as needed  Outcome: Progressing  Goal: Oral mucous membranes remain intact  Description: INTERVENTIONS  - Assess oral mucosa and hygiene practices  - Implement preventative oral hygiene regimen  - Implement oral medicated treatments as ordered  - Initiate Nutrition services referral as needed  Outcome: Progressing     Problem: PAIN - ADULT  Goal: Verbalizes/displays adequate comfort level or baseline comfort level  Description: Interventions:  - Encourage patient to monitor pain and request assistance  - Assess pain using appropriate pain scale  - Administer analgesics based on type and severity of pain and evaluate response  - Implement non-pharmacological measures as appropriate and evaluate response  - Consider cultural and social influences on pain and pain management  - Notify physician/advanced practitioner if interventions unsuccessful or patient reports new pain  Outcome: Progressing     Problem: INFECTION - ADULT  Goal: Absence or prevention of progression during hospitalization  Description: INTERVENTIONS:  - Assess and monitor for signs and symptoms of infection  - Monitor lab/diagnostic results  - Monitor all insertion sites, i e  indwelling lines, tubes, and drains  - Monitor endotracheal if appropriate and nasal secretions for changes in amount and color  - Pierce appropriate cooling/warming therapies per order  - Administer medications as ordered  - Instruct and encourage patient and family to use good hand hygiene technique  - Identify and instruct in appropriate isolation precautions for identified infection/condition  Outcome: Progressing     Problem: SAFETY ADULT  Goal: Patient will remain free of falls  Description: INTERVENTIONS:  - Educate patient/family on patient safety including physical limitations  - Instruct patient to call for assistance with activity   - Consult OT/PT to assist with strengthening/mobility   - Keep Call bell within reach  - Keep bed low and locked with side rails adjusted as appropriate  - Keep care items and personal belongings within reach  - Initiate and maintain comfort rounds  - Make Fall Risk Sign visible to staff  - Offer Toileting every 2 Hours, in advance of need  - Apply yellow socks and bracelet for high fall risk patients  - Consider moving patient to room near nurses station  Outcome: Progressing  Goal: Maintain or return to baseline ADL function  Description: INTERVENTIONS:  -  Assess patient's ability to carry out ADLs; assess patient's baseline for ADL function and identify physical deficits which impact ability to perform ADLs (bathing, care of mouth/teeth, toileting, grooming, dressing, etc )  - Assess/evaluate cause of self-care deficits   - Assess range of motion  - Assess patient's mobility; develop plan if impaired  - Assess patient's need for assistive devices and provide as appropriate  - Encourage maximum independence but intervene and supervise when necessary  - Involve family in performance of ADLs  - Assess for home care needs following discharge   - Consider OT consult to assist with ADL evaluation and planning for discharge  - Provide patient education as appropriate  Outcome: Progressing  Goal: Maintains/Returns to pre admission functional level  Description: INTERVENTIONS:  - Perform BMAT or MOVE assessment daily    - Set and communicate daily mobility goal to care team and patient/family/caregiver  - Collaborate with rehabilitation services on mobility goals if consulted  - Out of bed for toileting  - Record patient progress and toleration of activity level   Outcome: Progressing     Problem: DISCHARGE PLANNING  Goal: Discharge to home or other facility with appropriate resources  Description: INTERVENTIONS:  - Identify barriers to discharge w/patient and caregiver  - Arrange for needed discharge resources and transportation as appropriate  - Identify discharge learning needs (meds, wound care, etc )  - Arrange for interpretive services to assist at discharge as needed  - Refer to Case Management Department for coordinating discharge planning if the patient needs post-hospital services based on physician/advanced practitioner order or complex needs related to functional status, cognitive ability, or social support system  Outcome: Progressing     Problem: Knowledge Deficit  Goal: Patient/family/caregiver demonstrates understanding of disease process, treatment plan, medications, and discharge instructions  Description: Complete learning assessment and assess knowledge base    Interventions:  - Provide teaching at level of understanding  - Provide teaching via preferred learning methods  Outcome: Progressing

## 2022-01-22 NOTE — RESPIRATORY THERAPY NOTE
01/21/22 1925   Oxygen Therapy/Pulse Ox   O2 Device Nasal cannula   Nasal Cannula O2 Flow Rate (L/min) 4 L/min   Calculated FIO2 (%) - Nasal Cannula 36   O2 Flow Rate (L/min) 4 L/min   SpO2 97 %   SpO2 Activity At Rest   $ Pulse Oximetry Spot Check Charge Completed

## 2022-01-22 NOTE — ASSESSMENT & PLAN NOTE
Lab Results   Component Value Date    HGBA1C 5 7 (H) 12/14/2021       Recent Labs     01/21/22  1826 01/21/22  2229 01/22/22  0652 01/22/22  1101   POCGLU 177* 222* 173* 144*       Blood Sugar Average: Last 72 hrs:  (P) 182 4     · Diabetic diet  · Fingerstick blood sugar with sliding scale coverage  · Hold home insulin Xultophy

## 2022-01-22 NOTE — ASSESSMENT & PLAN NOTE
· Potasium 6 0 specimen hemolyzed  · Telemetry monitoring  · Recheck potassium with new specimen  · Monitor potassium with diuresis

## 2022-01-22 NOTE — ASSESSMENT & PLAN NOTE
Lab Results   Component Value Date    EGFR 9 01/22/2022    EGFR 8 01/22/2022    EGFR 9 01/21/2022    CREATININE 5 89 (H) 01/22/2022    CREATININE 5 94 (H) 01/22/2022    CREATININE 5 53 (H) 01/21/2022     · History of CKD, recently started chemotherapy for colon cancer with significant worsening of kidney function with hospitalization for creatinine around 0 7   Baseline creatinine level is around 3  · ER PA consulted nephrology who recommended Lasix 80 mg and nephrology consultation  · I&O, daily weights  · Urinary retention protocol  · Nephrology recommendations appreciated

## 2022-01-22 NOTE — ED NOTES
Patient fed  He states he is not feeling well and will probably not eat    Tray left for pt     Jerrell Reddy, MARGAUX  01/22/22 7541

## 2022-01-22 NOTE — PROGRESS NOTES
Nir 43  Progress Note Niki aKufman 1955, 74 y o  male MRN: [id number]  Unit/Bed#: ED 27 Encounter: [id number]  Primary Care Provider: Miguel Barton DO   Date and time admitted to hospital: 1/21/2022  1:36 PM    * Acute on chronic diastolic congestive heart failure ([facility] [address] )  Assessment & Plan  · Presented for shortness of breath while receiving fluids at infusion center due to nausea from chemotherapy  · He is on 4 L of oxygen chronically  · He was placed on CPAP by EMS prior to arrival for increased work of breathing  · EKG:  Paced rhythm  · BNP: 1067  · Chest x-ray: Persistent or recurrent pulmonary vascular congestion persistent cardiomegaly   · He received 40 mg of IV Lasix and is diuresing  · Per nephrology Bumex 2 mg IV  · Daily weights and I&Os  · Cardiology consult      Acute kidney injury superimposed on chronic kidney disease [facility])  Assessment & Plan  Lab Results   Component Value Date    EGFR 9 01/22/2022    EGFR 8 01/22/2022    EGFR 9 01/21/2022    CREATININE 5 89 (H) 01/22/2022    CREATININE 5 94 (H) 01/22/2022    CREATININE 5 53 (H) 01/21/2022     · History of CKD, recently started chemotherapy for colon cancer with significant worsening of kidney function with hospitalization for creatinine around 0 7   Baseline creatinine level is around 3  · ER PA consulted nephrology who recommended Lasix 80 mg and nephrology consultation  · I&O, daily weights  · Urinary retention protocol  · Nephrology recommendations appreciated    Colon cancer [facility])  Assessment & Plan  · Adenocarcinoma 01/08/2021 currently being treated with chemotherapy at infusion center  · Continue outpatient follow-up after resolution of acute issue    Hyperkalemia  Assessment & Plan  · Potasium 6 0 specimen hemolyzed  · Telemetry monitoring  · Recheck potassium with new specimen  · Monitor potassium with diuresis    Type 2 diabetes mellitus with chronic kidney disease, with long-term current use of insulin [facility])  Assessment & Plan  Lab Results   Component Value Date    HGBA1C 5 7 (H) 2021       Recent Labs     22  1826 22  2229 22  0652 22  1101   POCGLU 177* 222* 173* 144*       Blood Sugar Average: Last 72 hrs:  (P) 182 4     · Diabetic diet  · Fingerstick blood sugar with sliding scale coverage  · Hold home insulin Xultophy    NICOLASA (obstructive sleep apnea)  Assessment & Plan  · CPAP at bedtime    Diabetic ulcer of left foot [facility])  Assessment & Plan  Lab Results   Component Value Date    HGBA1C 5 7 (H) 2021       Recent Labs     22  1826 22  2229 22  0652 22  1101   POCGLU 177* 222* 173* 144*       Blood Sugar Average: Last 72 hrs:  (P) 182 4     · Follows up with Podiatry  · Wound care consult      VTE Pharmacologic Prophylaxis:   Pharmacologic: Apixaban (Eliquis)  Patient Centered Rounds: I have performed bedside rounds with nursing staff today  Discussions with Specialists or Other Care Team Provider:  Nephrology    Education and Discussions with Family / Patient:  Wife at bedside    Time Spent for Care: 30 minutes  More than 50% of total time spent on counseling and coordination of care as described above  Current Length of Stay: 1 day(s)    Current Patient Status: Inpatient   Certification Statement: The patient will continue to require additional inpatient hospital stay due to per plan above    Discharge Plan: To be determined    Code Status: Level 3 - DNAR and DNI      Subjective:   Patient having nausea and vomiting this morning until he took the Zofran  Can hours sleeping intermittently, but says he has no appetite  He says his breathing is better      Objective:     Vitals:   Temp (24hrs), Av 4 °F (36 3 °C), Min:97 4 °F (36 3 °C), Max:97 4 °F (36 3 °C)    Temp:  [97 4 °F (36 3 °C)] 97 4 °F (36 3 °C)  HR:  [60-64] 60  Resp:  [18] 18  BP: (130-164)/(60-89) 139/65  SpO2:  [88 %-99 %] 94 %  There is no height or weight on file to calculate BMI  Input and Output Summary (last 24 hours): Intake/Output Summary (Last 24 hours) at 1/22/2022 1626  Last data filed at 1/22/2022 1453  Gross per 24 hour   Intake 960 ml   Output 100 ml   Net 860 ml       Physical Exam:     Physical Exam  Vitals and nursing note reviewed  Constitutional:       Appearance: He is ill-appearing  HENT:      Head: Normocephalic and atraumatic  Mouth/Throat:      Mouth: Mucous membranes are moist       Pharynx: Oropharynx is clear  Eyes:      Extraocular Movements: Extraocular movements intact  Pupils: Pupils are equal, round, and reactive to light  Cardiovascular:      Rate and Rhythm: Normal rate and regular rhythm  Pulses: Normal pulses  Heart sounds: Normal heart sounds  Pulmonary:      Effort: Pulmonary effort is normal       Breath sounds: Decreased breath sounds present  Abdominal:      General: Bowel sounds are normal       Palpations: Abdomen is soft  Tenderness: There is no abdominal tenderness  Musculoskeletal:         General: Normal range of motion  Cervical back: Normal range of motion and neck supple  Skin:     General: Skin is warm and dry  Capillary Refill: Capillary refill takes less than 2 seconds  Neurological:      General: No focal deficit present  Mental Status: He is alert and oriented to person, place, and time  Additional Data:     Labs:    Results from last 7 days   Lab Units 01/22/22  0646 01/21/22  1359 01/21/22  1359   WBC Thousand/uL 12 45*   < > 13 32*   HEMOGLOBIN g/dL 12 0   < > 12 2   HEMATOCRIT % 34 8*   < > 37 2   PLATELETS Thousands/uL 266   < > 320   NEUTROS PCT %  --   --  88*   LYMPHS PCT %  --   --  7*   LYMPHO PCT % 14  --   --    MONOS PCT %  --   --  4   MONO PCT % 2*  --   --    EOS PCT % 0  --  0    < > = values in this interval not displayed       Results from last 7 days   Lab Units 01/22/22  1441 01/22/22  0646 01/22/22  0646   SODIUM mmol/L 126*   < > 127*   POTASSIUM mmol/L 6 0*   < > 5 6*   CHLORIDE mmol/L 94*   < > 95*   CO2 mmol/L 17*   < > 17*   BUN mg/dL 118*   < > 112*   CREATININE mg/dL 5 89*   < > 5 94*   ANION GAP mmol/L 15*   < > 15*   CALCIUM mg/dL 9 4   < > 9 3   ALBUMIN g/dL  --   --  3 9   TOTAL BILIRUBIN mg/dL  --   --  1 10*   ALK PHOS U/L  --   --  48   ALT U/L  --   --  163*   AST U/L  --   --  148*   GLUCOSE RANDOM mg/dL 153*   < > 166*    < > = values in this interval not displayed  Results from last 7 days   Lab Units 01/21/22  1359   INR  1 77*     Results from last 7 days   Lab Units 01/22/22  1101 01/22/22  0652 01/21/22  2229 01/21/22  1826 01/21/22  1547   POC GLUCOSE mg/dl 144* 173* 222* 177* 196*         Results from last 7 days   Lab Units 01/22/22  1441   LACTIC ACID mmol/L 1 2           * I Have Reviewed All Lab Data Listed Above  * Additional Pertinent Lab Tests Reviewed:  All [facility] Admission Reviewed      Recent Cultures (last 7 days):           Last 24 Hours Medication List:   Current Facility-Administered Medications   Medication Dose Route Frequency Provider Last Rate    amLODIPine  10 mg Oral Daily Artice Dung, CRNP      apixaban  5 mg Oral Q12H Artice Dung, CRNP      bumetanide  2 mg Intravenous Once [facility], CRNP      calcium gluconate  1 g Intravenous Once [facility], CRNP      dextrose  25 mL Intravenous Once [facility], CRNP      famotidine  20 mg Oral Daily Artice Dung, CRNP      fish oil  1,000 mg Oral BID Artice Dung, CRNP      insulin lispro  1-6 Units Subcutaneous TID AC Artice Dung, CRNP      insulin lispro  1-6 Units Subcutaneous HS Artice Dung, CRNP      insulin regular  10 Units Intravenous Once [facility], CRNP      metoprolol tartrate  50 mg Oral Q12H Artice Dung, CRNP      ondansetron  4 mg Intravenous Q8H PRN Artice Dung, CRNP      sodium bicarbonate  50 mEq Intravenous Once [facility], CRNP      sodium polystyrene  15 g Oral Once PAT Gage      tamsulosin  0 4 mg Oral Daily With [address] [facility], PAT          Today, Patient Was Seen By: PAT Silva    ** Please Note: Dictation voice to text software may have been used in the creation of this document   **

## 2022-01-23 ENCOUNTER — APPOINTMENT (INPATIENT)
Dept: DIALYSIS | Facility: HOSPITAL | Age: 67
DRG: 682 | End: 2022-01-23
Payer: MEDICARE

## 2022-01-23 LAB
ANION GAP SERPL CALCULATED.3IONS-SCNC: 22 MMOL/L (ref 4–13)
BUN SERPL-MCNC: 125 MG/DL (ref 5–25)
CALCIUM SERPL-MCNC: 9.3 MG/DL (ref 8.4–10.2)
CHLORIDE SERPL-SCNC: 91 MMOL/L (ref 96–108)
CO2 SERPL-SCNC: 14 MMOL/L (ref 21–32)
CREAT SERPL-MCNC: 6.14 MG/DL (ref 0.6–1.3)
CREAT UR-MCNC: 85.7 MG/DL
ERYTHROCYTE [DISTWIDTH] IN BLOOD BY AUTOMATED COUNT: 16.1 % (ref 11.6–15.1)
GFR SERPL CREATININE-BSD FRML MDRD: 8 ML/MIN/1.73SQ M
GLUCOSE SERPL-MCNC: 153 MG/DL (ref 65–140)
GLUCOSE SERPL-MCNC: 169 MG/DL (ref 65–140)
GLUCOSE SERPL-MCNC: 190 MG/DL (ref 65–140)
GLUCOSE SERPL-MCNC: 242 MG/DL (ref 65–140)
GLUCOSE SERPL-MCNC: 251 MG/DL (ref 65–140)
HBV CORE AB SER QL: NORMAL
HBV CORE IGM SER QL: NORMAL
HBV SURFACE AB SER-ACNC: <3.1 MIU/ML
HBV SURFACE AG SER QL: NORMAL
HCT VFR BLD AUTO: 33.7 % (ref 36.5–49.3)
HCV AB SER QL: NORMAL
HGB BLD-MCNC: 11.5 G/DL (ref 12–17)
MCH RBC QN AUTO: 31 PG (ref 26.8–34.3)
MCHC RBC AUTO-ENTMCNC: 34.1 G/DL (ref 31.4–37.4)
MCV RBC AUTO: 91 FL (ref 82–98)
PLATELET # BLD AUTO: 279 THOUSANDS/UL (ref 149–390)
PMV BLD AUTO: 9.7 FL (ref 8.9–12.7)
POTASSIUM SERPL-SCNC: 4.8 MMOL/L (ref 3.5–5.3)
RBC # BLD AUTO: 3.71 MILLION/UL (ref 3.88–5.62)
SODIUM 24H UR-SCNC: 15 MOL/L
SODIUM SERPL-SCNC: 127 MMOL/L (ref 135–147)
WBC # BLD AUTO: 12.5 THOUSAND/UL (ref 4.31–10.16)

## 2022-01-23 PROCEDURE — 99232 SBSQ HOSP IP/OBS MODERATE 35: CPT | Performed by: NURSE PRACTITIONER

## 2022-01-23 PROCEDURE — 99233 SBSQ HOSP IP/OBS HIGH 50: CPT | Performed by: NURSE PRACTITIONER

## 2022-01-23 PROCEDURE — 86705 HEP B CORE ANTIBODY IGM: CPT | Performed by: NURSE PRACTITIONER

## 2022-01-23 PROCEDURE — 94760 N-INVAS EAR/PLS OXIMETRY 1: CPT

## 2022-01-23 PROCEDURE — 94660 CPAP INITIATION&MGMT: CPT

## 2022-01-23 PROCEDURE — 85027 COMPLETE CBC AUTOMATED: CPT | Performed by: NURSE PRACTITIONER

## 2022-01-23 PROCEDURE — 82948 REAGENT STRIP/BLOOD GLUCOSE: CPT

## 2022-01-23 PROCEDURE — 99223 1ST HOSP IP/OBS HIGH 75: CPT | Performed by: INTERNAL MEDICINE

## 2022-01-23 PROCEDURE — 87340 HEPATITIS B SURFACE AG IA: CPT | Performed by: NURSE PRACTITIONER

## 2022-01-23 PROCEDURE — 86803 HEPATITIS C AB TEST: CPT | Performed by: NURSE PRACTITIONER

## 2022-01-23 PROCEDURE — 86706 HEP B SURFACE ANTIBODY: CPT | Performed by: NURSE PRACTITIONER

## 2022-01-23 PROCEDURE — 5A1D70Z PERFORMANCE OF URINARY FILTRATION, INTERMITTENT, LESS THAN 6 HOURS PER DAY: ICD-10-PCS | Performed by: NURSE PRACTITIONER

## 2022-01-23 PROCEDURE — 80048 BASIC METABOLIC PNL TOTAL CA: CPT | Performed by: NURSE PRACTITIONER

## 2022-01-23 PROCEDURE — 86704 HEP B CORE ANTIBODY TOTAL: CPT | Performed by: NURSE PRACTITIONER

## 2022-01-23 RX ORDER — METOCLOPRAMIDE HYDROCHLORIDE 5 MG/ML
10 INJECTION INTRAMUSCULAR; INTRAVENOUS EVERY 6 HOURS PRN
Status: DISCONTINUED | OUTPATIENT
Start: 2022-01-23 | End: 2022-01-23

## 2022-01-23 RX ORDER — AMLODIPINE BESYLATE 10 MG/1
10 TABLET ORAL DAILY
Status: DISCONTINUED | OUTPATIENT
Start: 2022-01-24 | End: 2022-01-28 | Stop reason: HOSPADM

## 2022-01-23 RX ORDER — CALCIUM ACETATE 667 MG/1
667 CAPSULE ORAL
Status: DISCONTINUED | OUTPATIENT
Start: 2022-01-23 | End: 2022-01-28 | Stop reason: HOSPADM

## 2022-01-23 RX ORDER — PROCHLORPERAZINE MALEATE 5 MG/1
5 TABLET ORAL EVERY 6 HOURS PRN
Status: DISCONTINUED | OUTPATIENT
Start: 2022-01-23 | End: 2022-01-24

## 2022-01-23 RX ADMIN — APIXABAN 5 MG: 5 TABLET, FILM COATED ORAL at 17:04

## 2022-01-23 RX ADMIN — FAMOTIDINE 20 MG: 20 TABLET ORAL at 08:02

## 2022-01-23 RX ADMIN — APIXABAN 5 MG: 5 TABLET, FILM COATED ORAL at 08:06

## 2022-01-23 RX ADMIN — CALCIUM ACETATE 667 MG: 667 CAPSULE ORAL at 17:04

## 2022-01-23 RX ADMIN — SODIUM BICARBONATE 1300 MG: 650 TABLET ORAL at 08:02

## 2022-01-23 RX ADMIN — INSULIN LISPRO 2 UNITS: 100 INJECTION, SOLUTION INTRAVENOUS; SUBCUTANEOUS at 17:03

## 2022-01-23 RX ADMIN — SODIUM BICARBONATE 50 MEQ: 84 INJECTION INTRAVENOUS at 07:54

## 2022-01-23 RX ADMIN — SODIUM BICARBONATE 1300 MG: 650 TABLET ORAL at 17:04

## 2022-01-23 RX ADMIN — METOPROLOL TARTRATE 50 MG: 50 TABLET, FILM COATED ORAL at 17:04

## 2022-01-23 RX ADMIN — ONDANSETRON 4 MG: 2 INJECTION INTRAMUSCULAR; INTRAVENOUS at 05:34

## 2022-01-23 RX ADMIN — INSULIN LISPRO 3 UNITS: 100 INJECTION, SOLUTION INTRAVENOUS; SUBCUTANEOUS at 23:31

## 2022-01-23 RX ADMIN — INSULIN LISPRO 3 UNITS: 100 INJECTION, SOLUTION INTRAVENOUS; SUBCUTANEOUS at 11:24

## 2022-01-23 RX ADMIN — OMEGA-3 FATTY ACIDS CAP 1000 MG 1000 MG: 1000 CAP at 08:02

## 2022-01-23 RX ADMIN — TAMSULOSIN HYDROCHLORIDE 0.4 MG: 0.4 CAPSULE ORAL at 17:04

## 2022-01-23 RX ADMIN — METOCLOPRAMIDE 10 MG: 5 INJECTION, SOLUTION INTRAMUSCULAR; INTRAVENOUS at 01:08

## 2022-01-23 RX ADMIN — ONDANSETRON 4 MG: 2 INJECTION INTRAMUSCULAR; INTRAVENOUS at 22:56

## 2022-01-23 RX ADMIN — PROCHLORPERAZINE MALEATE 5 MG: 5 TABLET ORAL at 21:13

## 2022-01-23 RX ADMIN — PROCHLORPERAZINE MALEATE 5 MG: 5 TABLET ORAL at 08:26

## 2022-01-23 RX ADMIN — ONDANSETRON 4 MG: 2 INJECTION INTRAMUSCULAR; INTRAVENOUS at 02:11

## 2022-01-23 RX ADMIN — OMEGA-3 FATTY ACIDS CAP 1000 MG 1000 MG: 1000 CAP at 17:04

## 2022-01-23 RX ADMIN — SODIUM BICARBONATE 1300 MG: 650 TABLET ORAL at 11:39

## 2022-01-23 NOTE — ASSESSMENT & PLAN NOTE
· AG 22, CO2 14  · Nephrology input appreciated  · Treated with Bicarb  · Dialysis cath placed by CC  · Plan for dialysis today

## 2022-01-23 NOTE — PROGRESS NOTES
NEPHROLOGY PROGRESS NOTE   Vonnie Caldwell 74 y o  male MRN: [id number]  Unit/Bed#: -01 Encounter: [id number]    Assessment/Plan:    Vonnie Caldwell is a 74 y o  male whose pertinent medical problems include CKD 4 with nephrotic syndrome, suspected diabetic nephropathy, recently diagnosed with colon cancer status post 1st chemotherapy treatment last week however developed profound nausea and required fluids and Zofran at infusion center admitted 1/21 with chief complaint of dyspnea at infusion center being treated for pulmonary edema, hemodialysis dependent acute kidney injury, electrolyte derangements, acid-base derangements  Renal following along for ELZBIETA/CKD/acidosis/hyperkalemia/oliguria  Plan outlined below  1  Hemodialysis dependent acute kidney injury POA  · Status post 6 mg IV Bumex last evening with 1 L urine output  Fortunately, liberated from oxygen therapy  Updated blood work significant for persistent severe azotemia, elevated anion gap metabolic acidosis, mild hyponatremia  Consent obtained by me yesterday placed in chart  Discussed again with patient today  Temporary hemodialysis catheter was placed last evening by Critical Care  Will plan on 1st hemodialysis session today for 2 hours with 500 mL-1 L ultrafiltration goal with a low blood flow rate  Next hemodialysis treatment tomorrow, 1/24  Unclear at this point if he will need long-term dialysis  · Renal ultrasound to be done  Urine studies pending  2  Access  · Right IJ temporary hemodialysis catheter  3  Stage 4 chronic kidney disease with most recent baseline creatinine around 2 6 mg/dL  4  Hyperkalemia  · Resolved with medical management  5  Pulmonary edema, acute on chronic diastolic congestive heart failure  · Oxygen requirements improved with 6 mg IV Bumex now on room air  Unfortunately I am concerned that continue to aggressive diuresis will ultimately fail and/or worsened renal function    Will plan on dialysis today as above with ultrafiltration  6  Elevated anion gap metabolic acidosis, uremic acidosis  · Continue high-dose oral bicarbonate until hemodialysis can be provided  Give 1 amp bicarb now  Lactic acidosis ruled out  7  Adenocarcinoma of the colon  8  Side effect of chemotherapy  · Continue to treat nausea  If elevated QTC can try Tigan or scopolamine patch  9  Suspected diabetic nephropathy  · Not a candidate for Ace or Arb  10  History of contrast induced nephropathy 11/2021      ROS  Continues to have nausea  A complete 10 point review of systems have been performed and are otherwise negative         Historical Information   Past Medical History:   Diagnosis Date    Anemia     iron def    Arthritis     OA    Bruise of both arms     forearms and both hands    Bruises easily     Cancer ([facility] [address] ) 09/01/2021    colon    CHF (congestive heart failure) (HCC)     Chronic kidney disease     CPAP (continuous positive airway pressure) dependence     Diabetes mellitus ([facility] [address] )     Diabetic foot ulcer ([facility] [address] )     Eczema     Erectile dysfunction     Gout     Heart murmur     History of permanent cardiac pacemaker placement 11/2018    Hyperlipidemia     Hypertension     Hypoglycemia 3/8/2019    Murmur     Nephropathy     Osteoarthritis     Pacemaker 11/06/2018    [facility]     Seasonal allergies     Sleep apnea     Toe infection     bilat great toes    Vertigo     Walks frequently     Wears dentures     upper    Wears glasses      Past Surgical History:   Procedure Laterality Date    CARDIAC PACEMAKER PLACEMENT      CARPAL TUNNEL RELEASE Left     CARPAL TUNNEL RELEASE Right     CARPAL TUNNEL RELEASE      COLONOSCOPY  08/2020    COLONOSCOPY      FL GUIDED CENTRAL VENOUS ACCESS DEVICE INSERTION  1/11/2022    FOOT AMPUTATION Left 2/10/2020    Procedure: PARTIAL 1ST RAY AMPUTATION;  Surgeon: Emma Lyles DPM;  [facility];  Service: Podiatry    INCISION AND DRAINAGE OF WOUND Left 2020    Procedure: INCISION AND DRAINAGE (I&D) EXTREMITY AND REMOVAL OF SESMOID BONE;  Surgeon: Peri Aranda DPM;  [facility];  Service: Podiatry    IR PICC REPOSITION  2020    KNEE ARTHROSCOPY Left     KNEE ARTHROSCOPY Right     KNEE SURGERY      WV AMPUTATION TOE,I-P JT Bilateral 2017    Procedure: PARTIAL AMPUTATION RIGHT AND LEFT HALLUX ;  Surgeon: Taylor Valentine DPM;  [facility];  Service: Podiatry    WV AMPUTATION TOE,MT-P JT Left 2017    Procedure: 2ND TOE AMPUTATION;  Surgeon: Taylor Valentine DPM;  [facility];  Service: Podiatry    WV INSJ TUNNELED CTR VAD W/SUBQ PORT AGE 5 YR/> N/A 2022    Procedure: INSERTION VENOUS PORT ( PORT-A-CATH) IR;  Surgeon: Stepan Martinez DO;  [facility];  Service: Interventional Radiology    RESECTION LOW ANTERIOR LAPAROSCOPIC N/A 10/21/2021    Procedure: RESECTION LOW ANTERIOR LAPAROSCOPIC;  Surgeon: Junior Preet MD;  [facility];  Service: Colorectal    SHOULDER ARTHROSCOPY Left     with screws,RTC    SHOULDER SURGERY      TOE AMPUTATION Left 2020    Procedure: Fernando Dross;  Surgeon: Peri Aranda DPM;  [facility];  Service: Podiatry    UPPER GASTROINTESTINAL ENDOSCOPY  2020    Intestinal metaplasia prepyloric    VASECTOMY       Social History   Social History     Substance and Sexual Activity   Alcohol Use Never    Alcohol/week: 0 0 standard drinks     Social History     Substance and Sexual Activity   Drug Use No     Social History     Tobacco Use   Smoking Status Former Smoker    Packs/day: 0 50    Years: 20 00    Pack years: 10 00    Types: Cigarettes    Start date: 1    Quit date:     Years since quittin 0   Smokeless Tobacco Never Used   Tobacco Comment    quit 10 years ago       Family History:   Family History   Problem Relation Age of Onset    Heart disease Father         passed away    Hypertension Father     Pulmonary embolism Father     Hypertension Mother         assed away       Medications:  Pertinent medications were reviewed  Current Facility-Administered Medications   Medication Dose Route Frequency Provider Last Rate    amLODIPine  10 mg Oral Daily Jamison Krabbe, CRNP      apixaban  5 mg Oral Q12H Jamison Krabbe, CRNP      famotidine  20 mg Oral Daily Jamison Krabbe, CRNP      fish oil  1,000 mg Oral BID Jamison Krabbe, CRNP      insulin lispro  1-6 Units Subcutaneous TID AC Jamison Krabbe, CRNP      insulin lispro  1-6 Units Subcutaneous HS Jamison Krabbe, CRNP      metoprolol tartrate  50 mg Oral Q12H Jamison Krabbe, CRNP      ondansetron  4 mg Intravenous Q4H PRN PAT Casas      prochlorperazine  5 mg Oral Q6H PRN Devon Pierre PA-C      sodium bicarbonate  1,300 mg Oral TID after meals Teddy Glaze, PAT      tamsulosin  0 4 mg Oral Daily With Dinner Jamison Krabbe, CRNP           Allergies   Allergen Reactions    Invokana [Canagliflozin] Other (See Comments)     Caused circulation problems    Lamisil [Terbinafine] Blisters     Wife states " His skin peeled from head to toe"    Other Swelling     Pomegranate - facial swelling, no swelling of tongue, esophagus  Adhesive tape   Latex Rash         Vitals:   /63   Pulse 60   Temp (!) 96 3 °F (35 7 °C) (Oral)   Resp 18   SpO2 94%   There is no height or weight on file to calculate BMI    SpO2: 94 %,   SpO2 Activity: At Rest,   O2 Device: None (Room air)      Intake/Output Summary (Last 24 hours) at 1/23/2022 0904  Last data filed at 1/23/2022 0530  Gross per 24 hour   Intake 960 ml   Output 1250 ml   Net -290 ml     Invasive Devices  Report    Central Venous Catheter Line            Port A Cath Right Subclavian -- days          Hemodialysis Catheter            HD Temporary Double Catheter <1 day          Drain            Urethral Catheter Straight-tip 18 Fr  89 days                Physical Exam  General: conscious, cooperative, in no acute distress  Eyes: conjunctivae pink, anicteric sclerae  ENT: lips and mucous membranes moist  Neck: supple, no JVD, no masses  Chest: diminished breath sounds bilaterally, no crackles, ronchus or wheezings room air  CVS: S1 & S2, normal rate, regular rhythm  Abdomen: soft, non-tender, non-distended, normoactive bowel sounds  Extremities: trace edema of both legs  Skin: no rash  Neuro: awake, alert, oriented mildly confused      Diagnostic Data:  Lab: I have personally reviewed pertinent lab results  ,   CBC:  Results from last 7 days   Lab Units 01/23/22  0529   WBC Thousand/uL 12 50*   HEMOGLOBIN g/dL 11 5*   HEMATOCRIT % 33 7*   PLATELETS Thousands/uL 279      CMP:   Lab Results   Component Value Date    SODIUM 127 (L) 01/23/2022    K 4 8 01/23/2022    CL 91 (L) 01/23/2022    CO2 14 (L) 01/23/2022     (H) 01/23/2022    CREATININE 6 14 (H) 01/23/2022    CALCIUM 9 3 01/23/2022    EGFR 8 01/23/2022   ,   PT/INR: No results found for: PT, INR,   Magnesium: No components found for: MAG,  Phosphorous: No results found for: PHOS    Microbiology:  @[facility],(urinecx:7)@        PAT Duval    Portions of the record may have been created with voice recognition software  Occasional wrong word or "sound a like" substitutions may have occurred due to the inherent limitations of voice recognition software  Read the chart carefully and recognize, using context, where substitutions have occurred

## 2022-01-23 NOTE — ASSESSMENT & PLAN NOTE
Lab Results   Component Value Date    HGBA1C 5 7 (H) 12/14/2021       Recent Labs     01/22/22  1101 01/22/22  1653 01/22/22  2343 01/23/22  0701   POCGLU 144* 167* 151* 169*       Blood Sugar Average: Last 72 hrs:  (P) 174 875     · Follows up with Podiatry  · Wound care consult

## 2022-01-23 NOTE — CASE MANAGEMENT
Case Management Assessment & Discharge Planning Note    Patient name Tasneem Adair  Location [address] 225/-01 MRN [id number]  : 1955 Date 2022       Current Admission Date: 2022  Current Admission Diagnosis:Acute on chronic diastolic congestive heart failure [facility])   Patient Active Problem List    Diagnosis Date Noted    Acute on chronic diastolic congestive heart failure ([facility] ) 2022    Nausea 2022    Chemotherapy-induced neutropenia ([facility] ) 2022    Acute kidney injury superimposed on chronic kidney disease ([facility] ) 2021    RSV (respiratory syncytial virus pneumonia) 2021    Colon cancer ([facility] ) 10/23/2021    Absence of toe ([facility] ) 2021    Chronic obstructive pulmonary disease ([facility] ) 2020    SSS (sick sinus syndrome) ([facility] ) 2020    Onychomycosis 2020    Chronic painful diabetic neuropathy ([facility] ) 2020    Renovascular hypertension     Multiple drug resistant organism (MDRO) culture positive 2020    Amputation of left great toe ([facility] ) 2020    Osteomyelitis of left foot ([facility] ) 2020    Iron deficiency anemia 2020    Anxiety 2020    Staphylococcus aureus bacteremia 2020    Elevated troponin I level 2020    Type 2 diabetes mellitus with diabetic neuropathy, without long-term current use of insulin ([facility] ) 2020    S/P amputation of lesser toe, left ([facility] ) 2019    S/P amputation of lesser toe, right ([facility] ) 2019    Left renal artery stenosis (HCC) 2019    Chronic diastolic (congestive) heart failure (HCC) 2019    Acute pulmonary edema (HCC)     Hyperkalemia 2019    Abnormal CT of the chest 05/15/2019    Mitral valve stenosis     Anemia 2019    Fever 2019    Nonrheumatic aortic valve stenosis 2018    Type 2 diabetes mellitus with chronic kidney disease, with long-term current use of insulin ([facility] ) 2018    Hypertension 2018    Stage 4 chronic kidney disease ([facility] ) 11/14/2018    NICOLASA (obstructive sleep apnea) 11/11/2018    Urinary retention 11/06/2018    Persistent proteinuria 11/05/2018    Volume overload 11/05/2018    PAF (paroxysmal atrial fibrillation) ([place] [address] ) 11/04/2018    Complete heart block (HCC) 95/77/5167    Metabolic acidosis 54/87/2727    Acute kidney injury ([place] [address] ) 11/01/2018    Other hyperlipidemia 11/01/2018    Easy bruising 09/27/2017    Peripheral arterial disease ([place] [address] ) 09/06/2017    Vertigo 08/16/2017    Diabetic ulcer of left foot ([place] [address] ) 03/18/2017    Eczema 12/11/2015    PVCs (premature ventricular contractions) 71/64/3724    Systolic murmur 77/35/5479    Bilateral leg edema 07/09/2015    Erectile dysfunction of non-organic origin 04/24/2012    Gout 04/24/2012    Hyperlipidemia 04/24/2012    Uremic acidosis 04/24/2012    Arthritis 04/24/2012    Rhinitis 04/24/2012      LOS (days): 2  Geometric Mean LOS (GMLOS) (days): 3 10  Days to GMLOS:1 3     OBJECTIVE:    Risk of Unplanned Readmission Score: 35   Bundled Patient Payment: No Current Bundle     Current admission status: Inpatient       Preferred Pharmacy:   [facility]/pharmacy #[id number] [place], [place] - [address]  [address]  [address] [place] [address]  Phone: [phone] Fax: [phone]    Primary Care Provider: Elayne Pedersen DO    Primary Insurance: MEDICARE  Secondary Insurance: [facility]    ASSESSMENT:  [address] [facility], [address] [place] [address] Representative - Spouse   Primary Phone: [phone] (Mobile)               Advance Directives  Does patient have a [address]?: No  Was patient offered paperwork?: Yes (Wife declined paperwork at this time )  Does patient currently have a Health Care decision maker?: Yes, please see Health Care Proxy section  Does patient have Advance Directives?: Yes  Advance Directives: POLST  Primary Contact: Patient's Wife    Readmission Root Cause  30 Day Readmission: No    Patient Information  Admitted from[de-identified] Home  Mental Status: Alert  During Assessment patient was accompanied by: Spouse  Assessment information provided by[de-identified] Spouse (Patient does not have his hearing aides in and asked his wife to complete the assessment )  Primary Caregiver: Self  Support Systems: Family members,Spouse/significant [address] [facility] of Residence: [address] [facility] do you [facility] [address] [facility] options  Select all that apply : Stairs  Number of steps to enter home : 2  Do the steps have railings?: No  Type of Current Residence: [address] home  Upon entering residence, is there a bedroom on the main floor (no further steps)?: Yes  Upon entering residence, is there a bathroom on the main floor (no further steps)?: Yes  In the last 12 months, was there a time when you were not able to pay the mortgage or rent on time?: No  In the last 12 months, how many places have you lived?: 1  In the last 12 months, was there a time when you did not have a steady place to sleep or slept in a shelter (including now)?: No  Homeless/housing insecurity resource given?: N/A  Living Arrangements: Lives w/ Spouse/significant other,Lives w/ Extended Family  Is patient a ?: No    Activities of Daily Living Prior to Admission  Functional Status: Independent  Completes ADLs independently?: Yes  Ambulates independently?: Yes  Does patient use assisted devices?: No  Does patient currently own DME?: Yes  What DME does the patient currently own?: [employer]  Does patient have a history of Outpatient Therapy (PT/OT)?: No  Does the patient have a history of Short-Term Rehab?: No  Does patient have a history of HHC?: Yes (Patient has [facility] with Darvin Linares )  Does patient currently have [facility]?: No    Patient Information Continued  Income Source: Pension/snf  Does patient have prescription coverage?: Yes  Within the past 12 months, you worried that your food would run out before you got the money to buy more  [place] Never true  Within the past 12 months, the food you bought just didnt last and you didnt have money to get more : Never true  Food insecurity resource given?: N/A  Does patient receive dialysis treatments?: No  Does patient have a history of substance abuse?: No  Does patient have a history of Mental Health Diagnosis?: No    Means of Transportation  Means of Transport to Appts[de-identified] Drives Self  In the past 12 months, has lack of transportation kept you from medical appointments or from getting medications?: No  In the past 12 months, has lack of transportation kept you from meetings, work, or from getting things needed for daily living?: No  Was application for public transport provided?: N/A    DISCHARGE DETAILS:    Discharge planning discussed with[de-identified] Patient's Wife  Freedom of Choice: Yes  Comments - Freedom of Choice: Patient's wife denied any needs at this time, and she would like to think about [address] and will discuss with patient  CM to follow up closer to discharge regarding their decision  CM contacted family/caregiver?: Yes  Were Treatment Team discharge recommendations reviewed with patient/caregiver?: Yes  Did patient/caregiver verbalize understanding of patient care needs?: Yes  Were patient/caregiver advised of the risks associated with not following Treatment Team discharge recommendations?: Yes    Contacts  Patient Contacts: Malachi Bowling  Relationship to Patient[de-identified] Family  Contact Method:  In Person  Reason/Outcome: Continuity of [address]         Is the patient interested in [address] at discharge?: No    DME Referral Provided  Referral made for DME?: No    Would you like to participate in our [address] service program?  : No - Declined    Treatment Team Recommendation: Home  Discharge Destination Plan[de-identified] Home  Transport at Discharge : Family

## 2022-01-23 NOTE — PLAN OF CARE
Problem: Nutrition/Hydration-ADULT  Goal: Nutrient/Hydration intake appropriate for improving, restoring or maintaining nutritional needs  Description: Monitor and assess patient's nutrition/hydration status for malnutrition  Collaborate with interdisciplinary team and initiate plan and interventions as ordered  Monitor patient's weight and dietary intake as ordered or per policy  Utilize nutrition screening tool and intervene as necessary  Determine patient's food preferences and provide high-protein, high-caloric foods as appropriate       INTERVENTIONS:  - Monitor oral intake, urinary output, labs, and treatment plans  - Assess nutrition and hydration status and recommend course of action  - Evaluate amount of meals eaten  - Assist patient with eating if necessary   - Allow adequate time for meals  - Recommend/ encourage appropriate diets, oral nutritional supplements, and vitamin/mineral supplements  - Order, calculate, and assess calorie counts as needed  - Recommend, monitor, and adjust tube feedings and TPN/PPN based on assessed needs  - Assess need for intravenous fluids  - Provide specific nutrition/hydration education as appropriate  - Include patient/family/caregiver in decisions related to nutrition  Outcome: Not Progressing     Problem: RESPIRATORY - ADULT  Goal: Achieves optimal ventilation and oxygenation  Description: INTERVENTIONS:  - Assess for changes in respiratory status  - Assess for changes in mentation and behavior  - Position to facilitate oxygenation and minimize respiratory effort  - Oxygen administered by appropriate delivery if ordered  - Initiate smoking cessation education as indicated  - Encourage broncho-pulmonary hygiene including cough, deep breathe, Incentive Spirometry  - Assess the need for suctioning and aspirate as needed  - Assess and instruct to report SOB or any respiratory difficulty  - Respiratory Therapy support as indicated  Outcome: Not Progressing     Problem: GASTROINTESTINAL - ADULT  Goal: Minimal or absence of nausea and/or vomiting  Description: INTERVENTIONS:  - Administer IV fluids if ordered to ensure adequate hydration  - Maintain NPO status until nausea and vomiting are resolved  - Nasogastric tube if ordered  - Administer ordered antiemetic medications as needed  - Provide nonpharmacologic comfort measures as appropriate  - Advance diet as tolerated, if ordered  - Consider nutrition services referral to assist patient with adequate nutrition and appropriate food choices  Outcome: Not Progressing  Goal: Maintains or returns to baseline bowel function  Description: INTERVENTIONS:  - Assess bowel function  - Encourage oral fluids to ensure adequate hydration  - Administer IV fluids if ordered to ensure adequate hydration  - Administer ordered medications as needed  - Encourage mobilization and activity  - Consider nutritional services referral to assist patient with adequate nutrition and appropriate food choices  Outcome: Not Progressing  Goal: Maintains adequate nutritional intake  Description: INTERVENTIONS:  - Monitor percentage of each meal consumed  - Identify factors contributing to decreased intake, treat as appropriate  - Assist with meals as needed  - Monitor I&O, weight, and lab values if indicated  - Obtain nutrition services referral as needed  Outcome: Not Progressing  Goal: Oral mucous membranes remain intact  Description: INTERVENTIONS  - Assess oral mucosa and hygiene practices  - Implement preventative oral hygiene regimen  - Implement oral medicated treatments as ordered  - Initiate Nutrition services referral as needed  Outcome: Not Progressing     Problem: PAIN - ADULT  Goal: Verbalizes/displays adequate comfort level or baseline comfort level  Description: Interventions:  - Encourage patient to monitor pain and request assistance  - Assess pain using appropriate pain scale  - Administer analgesics based on type and severity of pain and evaluate response  - Implement non-pharmacological measures as appropriate and evaluate response  - Consider cultural and social influences on pain and pain management  - Notify physician/advanced practitioner if interventions unsuccessful or patient reports new pain  Outcome: Not Progressing     Problem: INFECTION - ADULT  Goal: Absence or prevention of progression during hospitalization  Description: INTERVENTIONS:  - Assess and monitor for signs and symptoms of infection  - Monitor lab/diagnostic results  - Monitor all insertion sites, i e  indwelling lines, tubes, and drains  - Monitor endotracheal if appropriate and nasal secretions for changes in amount and color  - [place] appropriate cooling/warming therapies per order  - Administer medications as ordered  - Instruct and encourage patient and family to use good hand hygiene technique  - Identify and instruct in appropriate isolation precautions for identified infection/condition  Outcome: Not Progressing     Problem: SAFETY ADULT  Goal: Patient will remain free of falls  Description: INTERVENTIONS:  - Educate patient/family on patient safety including physical limitations  - Instruct patient to call for assistance with activity   - Consult OT/PT to assist with strengthening/mobility   - Keep Call bell within reach  - Keep bed low and locked with side rails adjusted as appropriate  - Keep care items and personal belongings within reach  - Initiate and maintain comfort rounds  - Make Fall Risk Sign visible to staff  - Offer Toileting every 2 Hours, in advance of need  - Apply yellow socks and bracelet for high fall risk patients  - Consider moving patient to room near nurses station  Outcome: Not Progressing  Goal: Maintain or return to baseline ADL function  Description: INTERVENTIONS:  -  Assess patient's ability to carry out ADLs; assess patient's baseline for ADL function and identify physical deficits which impact ability to perform ADLs (bathing, care of mouth/teeth, toileting, grooming, dressing, etc )  - Assess/evaluate cause of self-care deficits   - Assess range of motion  - Assess patient's mobility; develop plan if impaired  - Assess patient's need for assistive devices and provide as appropriate  - Encourage maximum independence but intervene and supervise when necessary  - Involve family in performance of ADLs  - Assess for home care needs following discharge   - Consider OT consult to assist with ADL evaluation and planning for discharge  - Provide patient education as appropriate  Outcome: Not Progressing  Goal: Maintains/Returns to pre admission functional level  Description: INTERVENTIONS:  - Perform BMAT or MOVE assessment daily    - Set and communicate daily mobility goal to care team and patient/family/caregiver  - Collaborate with rehabilitation services on mobility goals if consulted  - Out of bed for toileting  - Record patient progress and toleration of activity level   Outcome: Not Progressing     Problem: DISCHARGE PLANNING  Goal: Discharge to home or other facility with appropriate resources  Description: INTERVENTIONS:  - Identify barriers to discharge w/patient and caregiver  - Arrange for needed discharge resources and transportation as appropriate  - Identify discharge learning needs (meds, wound care, etc )  - Arrange for interpretive services to assist at discharge as needed  - Refer to Case Management Department for coordinating discharge planning if the patient needs post-hospital services based on physician/advanced practitioner order or complex needs related to functional status, cognitive ability, or social support system  Outcome: Not Progressing     Problem: Knowledge Deficit  Goal: Patient/family/caregiver demonstrates understanding of disease process, treatment plan, medications, and discharge instructions  Description: Complete learning assessment and assess knowledge base    Interventions:  - Provide teaching at level of understanding  - Provide teaching via preferred learning methods  Outcome: Not Progressing     Problem: Prexisting or High Potential for Compromised Skin Integrity  Goal: Skin integrity is maintained or improved  Description: INTERVENTIONS:  - Identify patients at risk for skin breakdown  - Assess and monitor skin integrity  - Assess and monitor nutrition and hydration status  - Monitor labs   - Assess for incontinence   - Turn and reposition patient  - Assist with mobility/ambulation  - Relieve pressure over bony prominences  - Avoid friction and shearing  - Provide appropriate hygiene as needed including keeping skin clean and dry  - Evaluate need for skin moisturizer/barrier cream  - Collaborate with interdisciplinary team   - Patient/family teaching  - Consider wound care consult   Outcome: Not Progressing     Problem: MOBILITY - ADULT  Goal: Maintain or return to baseline ADL function  Description: INTERVENTIONS:  -  Assess patient's ability to carry out ADLs; assess patient's baseline for ADL function and identify physical deficits which impact ability to perform ADLs (bathing, care of mouth/teeth, toileting, grooming, dressing, etc )  - Assess/evaluate cause of self-care deficits   - Assess range of motion  - Assess patient's mobility; develop plan if impaired  - Assess patient's need for assistive devices and provide as appropriate  - Encourage maximum independence but intervene and supervise when necessary  - Involve family in performance of ADLs  - Assess for home care needs following discharge   - Consider OT consult to assist with ADL evaluation and planning for discharge  - Provide patient education as appropriate  Outcome: Not Progressing  Goal: Maintains/Returns to pre admission functional level  Description: INTERVENTIONS:  - Perform BMAT or MOVE assessment daily    - Set and communicate daily mobility goal to care team and patient/family/caregiver     - Collaborate with rehabilitation services on mobility goals if consulted  - Out of bed for toileting  - Record patient progress and toleration of activity level   Outcome: Not Progressing

## 2022-01-23 NOTE — PROGRESS NOTES
9028-2524: Pt arrived to ICU bed 8 to have dialysis catheter placed  Pt placed on monitor  See Flowsheets for vitals  NSR on monitor  Pt given sip of water & warm blanket for comfort upon request  Time out procedure done by PAT English & myself  Pt tolerated procedure well  2240: New dressing applied to R IJ HD cath site  Pt provided with water within reach & call bell  Denies pain  Vital signs stable  2250: PCXR completed; provider evaluated exam      2300: Pt taken back to [address] unit with receiving nurse

## 2022-01-23 NOTE — PROGRESS NOTES
[address]  Progress Note Onelia Hua 1955, 74 y o  male MRN: [id number]  Unit/Bed#: MS [facility] Encounter: [id number]  Primary Care Provider: Milton Santiago DO   Date and time admitted to hospital: 1/21/2022  1:36 PM    * Acute on chronic diastolic congestive heart failure ([address] )  Assessment & Plan  · Presented for shortness of breath while receiving fluids at infusion center due to nausea from chemotherapy  · He is on 4 L of oxygen chronically  · He was placed on CPAP by EMS prior to arrival for increased work of breathing  · EKG:  Paced rhythm  · BNP: 1067  · Chest x-ray: Persistent or recurrent pulmonary vascular congestion persistent cardiomegaly   · He received 40 mg of IV Lasix and is diuresing  · Per nephrology Bumex 2 mg IV  · Daily weights and I&Os  · Cardiology consult      Acute kidney injury superimposed on chronic kidney disease [facility])  Assessment & Plan  Lab Results   Component Value Date    EGFR 8 01/23/2022    EGFR 8 01/22/2022    EGFR 9 01/22/2022    CREATININE 6 14 (H) 01/23/2022    CREATININE 5 96 (H) 01/22/2022    CREATININE 5 89 (H) 01/22/2022     · History of CKD, recently started chemotherapy for colon cancer with significant worsening of kidney function with hospitalization for creatinine around 0 7  Baseline creatinine level is around 3  · ER PA consulted nephrology who recommended Lasix 80 mg and nephrology consultation  · I&O, daily weights  · Urinary retention protocol  · Nephrology recommendations appreciated    Colon cancer [facility])  Assessment & Plan  · Adenocarcinoma 01/08/2021 currently being treated with chemotherapy at infusion center  · Continue outpatient follow-up after resolution of acute issue    Hyponatremia  Assessment & Plan  · Sodium 127   This could be hypervolemic hyponatremia  · Plan for dialysis today  · Recheck metabolic panel and trend potassium    Hyperkalemia  Assessment & Plan  · Potasium 6 0 yeasterday, specimen hemolyzed  · Today is 4 8 after hyperkalemia treatment  · Plan for dialysis today  · Recheck metabolic panel and trend potassium      Type 2 diabetes mellitus with chronic kidney disease, with long-term current use of insulin [facility])  Assessment & Plan  Lab Results   Component Value Date    HGBA1C 5 7 (H) 12/14/2021       Recent Labs     01/22/22  1101 01/22/22  1653 01/22/22  2343 01/23/22  0701   POCGLU 144* 167* 151* 169*       Blood Sugar Average: Last 72 hrs:  (P) 174 875     · Diabetic diet  · Fingerstick blood sugar with sliding scale coverage  · Hold home insulin Xultophy    NICOLASA (obstructive sleep apnea)  Assessment & Plan  · CPAP at bedtime    Uremic acidosis  Assessment & Plan  · AG 22, CO2 14  · Nephrology input appreciated  · Treated with Bicarb  · Dialysis cath placed by CC  · Plan for dialysis today    Diabetic ulcer of left foot [facility])  Assessment & Plan  Lab Results   Component Value Date    HGBA1C 5 7 (H) 12/14/2021       Recent Labs     01/22/22  1101 01/22/22  1653 01/22/22  2343 01/23/22  0701   POCGLU 144* 167* 151* 169*       Blood Sugar Average: Last 72 hrs:  (P) 174 875     · Follows up with Podiatry  · Wound care consult    VTE Pharmacologic Prophylaxis:   Pharmacologic: Apixaban (Eliquis)  Mechanical VTE Prophylaxis in Place: Yes    Patient Centered Rounds: I have performed bedside rounds with nursing staff today  Discussions with Specialists or Other Care Team Provider: Nephrology    Education and Discussions with Family / Patient: Wife at bedside    Time Spent for Care: 30 minutes  More than 50% of total time spent on counseling and coordination of care as described above  Current Length of Stay: 2 day(s)    Current Patient Status: Inpatient   Certification Statement: The patient will continue to require additional inpatient hospital stay due to per plan above    Discharge Plan: To be determined    Code Status: Level 3 - DNAR and DNI      Subjective:   Patient seen and examined    He says he feels better today in regard to the nausea  He is able to eat a little bit  Denies fever chills, diaphoresis, myalgia, chest pain, abdominal pain, dizziness, or syncope  Objective:     Vitals:   Temp (24hrs), Av 4 °F (36 3 °C), Min:96 3 °F (35 7 °C), Max:97 8 °F (36 6 °C)    Temp:  [96 3 °F (35 7 °C)-97 8 °F (36 6 °C)] 96 3 °F (35 7 °C)  HR:  [60-64] 60  Resp:  [18-26] 18  BP: ()/(51-89) 154/63  SpO2:  [89 %-97 %] 94 %  There is no height or weight on file to calculate BMI  Input and Output Summary (last 24 hours): Intake/Output Summary (Last 24 hours) at 2022 0906  Last data filed at 2022 0530  Gross per 24 hour   Intake 960 ml   Output 1250 ml   Net -290 ml       Physical Exam:     Physical Exam  Vitals and nursing note reviewed  Constitutional:       Appearance: He is ill-appearing  HENT:      Head: Normocephalic and atraumatic  Mouth/Throat:      Pharynx: Oropharynx is clear  Eyes:      Pupils: Pupils are equal, round, and reactive to light  Cardiovascular:      Rate and Rhythm: Normal rate and regular rhythm  Pulses: Normal pulses  Heart sounds: Murmur heard  Pulmonary:      Effort: Pulmonary effort is normal       Breath sounds: Decreased breath sounds present  Abdominal:      General: Bowel sounds are normal       Palpations: Abdomen is soft  Tenderness: There is no abdominal tenderness  Musculoskeletal:         General: Normal range of motion  Cervical back: Normal range of motion and neck supple  Right lower leg: Edema present  Left lower leg: Edema present  Comments: 2+ lower extremity pitting edema   Skin:     General: Skin is warm and dry  Capillary Refill: Capillary refill takes less than 2 seconds  Neurological:      General: No focal deficit present  Mental Status: He is alert and oriented to person, place, and time           Additional Data:     Labs:    Results from last 7 days   Lab Units 22  6541 01/22/22  0646 01/22/22  0646 01/21/22  1359 01/21/22  1359   WBC Thousand/uL 12 50*   < > 12 45*   < > 13 32*   HEMOGLOBIN g/dL 11 5*   < > 12 0   < > 12 2   HEMATOCRIT % 33 7*   < > 34 8*   < > 37 2   PLATELETS Thousands/uL 279   < > 266   < > 320   NEUTROS PCT %  --   --   --   --  88*   LYMPHS PCT %  --   --   --   --  7*   LYMPHO PCT %  --   --  14  --   --    MONOS PCT %  --   --   --   --  4   MONO PCT %  --   --  2*  --   --    EOS PCT %  --   --  0  --  0    < > = values in this interval not displayed  Results from last 7 days   Lab Units 01/23/22  0529 01/22/22  1441 01/22/22  0646   SODIUM mmol/L 127*   < > 127*   POTASSIUM mmol/L 4 8   < > 5 6*   CHLORIDE mmol/L 91*   < > 95*   CO2 mmol/L 14*   < > 17*   BUN mg/dL 125*   < > 112*   CREATININE mg/dL 6 14*   < > 5 94*   ANION GAP mmol/L 22*   < > 15*   CALCIUM mg/dL 9 3   < > 9 3   ALBUMIN g/dL  --   --  3 9   TOTAL BILIRUBIN mg/dL  --   --  1 10*   ALK PHOS U/L  --   --  48   ALT U/L  --   --  163*   AST U/L  --   --  148*   GLUCOSE RANDOM mg/dL 153*   < > 166*    < > = values in this interval not displayed  Results from last 7 days   Lab Units 01/21/22  1359   INR  1 77*     Results from last 7 days   Lab Units 01/23/22  0701 01/22/22  2343 01/22/22  1653 01/22/22  1101 01/22/22  0652 01/21/22  2229 01/21/22  1826 01/21/22  1547   POC GLUCOSE mg/dl 169* 151* 167* 144* 173* 222* 177* 196*         Results from last 7 days   Lab Units 01/22/22  1441   LACTIC ACID mmol/L 1 2           * I Have Reviewed All Lab Data Listed Above  * Additional Pertinent Lab Tests Reviewed:  Jess [facility] Admission Reviewed          Last 24 Hours Medication List:   Current Facility-Administered Medications   Medication Dose Route Frequency Provider Last Rate    amLODIPine  10 mg Oral Daily PAT Nunes      apixaban  5 mg Oral Q12H PAT Nunes      famotidine  20 mg Oral Daily PAT Nunes      fish oil  1,000 mg Oral BID PAT Henao      insulin lispro  1-6 Units Subcutaneous TID AC PAT Henao      insulin lispro  1-6 Units Subcutaneous HS PAT Henao      metoprolol tartrate  50 mg Oral Q12H PAT Henao      ondansetron  4 mg Intravenous Q4H PRN PAT Torres      prochlorperazine  5 mg Oral Q6H PRN Sanchez Stauffer PA-C      sodium bicarbonate  1,300 mg Oral TID after meals PAT Cadena      tamsulosin  0 4 [facility]          Today, Patient Was Seen By: PAT Henao    ** Please Note: Dictation voice to text software may have been used in the creation of this document   **

## 2022-01-23 NOTE — PROCEDURES
Temporary HD Catheter    Date/Time: 1/22/2022 9:33 PM  Performed by: Kip Krabbe  Authorized by: PAT METCALF     Patient location:  Bedside  Other Assisting Provider: No    Consent:     Consent obtained:  Written    Consent given by:  Patient    Risks discussed:  Arterial puncture, bleeding, incorrect placement, infection, nerve damage and pneumothorax  Universal protocol:     Required blood products, implants, devices, and special equipment available: yes      Site/side marked: yes      Immediately prior to procedure, a time out was called: yes      Patient identity confirmed:  Verbally with patient  Pre-procedure details:     Hand hygiene: Hand hygiene performed prior to insertion      Sterile barrier technique: All elements of maximal sterile technique followed      Skin preparation:  2% chlorhexidine    Skin preparation agent: Skin preparation agent completely dried prior to procedure    Indications:     Central line indications: dialysis    Anesthesia (see MAR for exact dosages): Anesthesia method:  Local infiltration    Local anesthetic:  Lidocaine 1% w/o epi  Procedure details:     Location:  Right internal jugular    Vessel type: vein      Laterality:  Right    Approach: percutaneous technique used      Patient position:  Flat    Catheter size:  12 5 Fr    Catheter length:  16 cm    Landmarks identified: yes      Ultrasound guidance: yes      Ultrasound image availability:  Images available in PACS (noted in PACS to be dated 1/21/21, not 1/22/21)    Sterile ultrasound techniques: Sterile gel and sterile probe covers were used      Number of attempts:  1    Successful placement: yes      Vessel of catheter tip end:  Cavoatrial junction  Post-procedure details:     Post-procedure:  Dressing applied and line sutured    Assessment:  Blood return through all ports    Post-procedure complications: none      Patient tolerance of procedure: Tolerated well, no immediate complications    Observer:  Yes Observer name:  Amaris Mistry

## 2022-01-23 NOTE — RESPIRATORY THERAPY NOTE
01/23/22 0100   Respiratory Assessment   Resp Comments pt stated he wears cpap of 11 cmh20 at home but pt is nauseated at this time, will not apply cpap at this time will cont to monitor    Subjective Data rn in room

## 2022-01-23 NOTE — ASSESSMENT & PLAN NOTE
Lab Results   Component Value Date    HGBA1C 5 7 (H) 12/14/2021       Recent Labs     01/22/22  1101 01/22/22  1653 01/22/22  2343 01/23/22  0701   POCGLU 144* 167* 151* 169*       Blood Sugar Average: Last 72 hrs:  (P) 174 875     · Diabetic diet  · Fingerstick blood sugar with sliding scale coverage  · Hold home insulin Xultophy

## 2022-01-23 NOTE — ASSESSMENT & PLAN NOTE
Lab Results   Component Value Date    EGFR 8 01/23/2022    EGFR 8 01/22/2022    EGFR 9 01/22/2022    CREATININE 6 14 (H) 01/23/2022    CREATININE 5 96 (H) 01/22/2022    CREATININE 5 89 (H) 01/22/2022     · History of CKD, recently started chemotherapy for colon cancer with significant worsening of kidney function with hospitalization for creatinine around 0 7   Baseline creatinine level is around 3  · ER PA consulted nephrology who recommended Lasix 80 mg and nephrology consultation  · I&O, daily weights  · Urinary retention protocol  · Nephrology recommendations appreciated

## 2022-01-23 NOTE — ASSESSMENT & PLAN NOTE
· Sodium 127   This could be hypervolemic hyponatremia  · Plan for dialysis today  · Recheck metabolic panel and trend potassium

## 2022-01-23 NOTE — PLAN OF CARE
Problem: Nutrition/Hydration-ADULT  Goal: Nutrient/Hydration intake appropriate for improving, restoring or maintaining nutritional needs  Description: Monitor and assess patient's nutrition/hydration status for malnutrition  Collaborate with interdisciplinary team and initiate plan and interventions as ordered  Monitor patient's weight and dietary intake as ordered or per policy  Utilize nutrition screening tool and intervene as necessary  Determine patient's food preferences and provide high-protein, high-caloric foods as appropriate       INTERVENTIONS:  - Monitor oral intake, urinary output, labs, and treatment plans  - Assess nutrition and hydration status and recommend course of action  - Evaluate amount of meals eaten  - Assist patient with eating if necessary   - Allow adequate time for meals  - Recommend/ encourage appropriate diets, oral nutritional supplements, and vitamin/mineral supplements  - Order, calculate, and assess calorie counts as needed  - Recommend, monitor, and adjust tube feedings and TPN/PPN based on assessed needs  - Assess need for intravenous fluids  - Provide specific nutrition/hydration education as appropriate  - Include patient/family/caregiver in decisions related to nutrition  1/23/2022 0819 by Rich Leal RN  Outcome: Progressing  1/22/2022 1857 by Rich Leal RN  Outcome: Progressing     Problem: RESPIRATORY - ADULT  Goal: Achieves optimal ventilation and oxygenation  Description: INTERVENTIONS:  - Assess for changes in respiratory status  - Assess for changes in mentation and behavior  - Position to facilitate oxygenation and minimize respiratory effort  - Oxygen administered by appropriate delivery if ordered  - Initiate smoking cessation education as indicated  - Encourage broncho-pulmonary hygiene including cough, deep breathe, Incentive Spirometry  - Assess the need for suctioning and aspirate as needed  - Assess and instruct to report SOB or any respiratory difficulty  - Respiratory Therapy support as indicated  1/23/2022 0819 by Marilyn Horne RN  Outcome: Progressing  1/22/2022 1857 by Marilyn Horne RN  Outcome: Progressing     Problem: GASTROINTESTINAL - ADULT  Goal: Minimal or absence of nausea and/or vomiting  Description: INTERVENTIONS:  - Administer IV fluids if ordered to ensure adequate hydration  - Maintain NPO status until nausea and vomiting are resolved  - Nasogastric tube if ordered  - Administer ordered antiemetic medications as needed  - Provide nonpharmacologic comfort measures as appropriate  - Advance diet as tolerated, if ordered  - Consider nutrition services referral to assist patient with adequate nutrition and appropriate food choices  1/23/2022 0819 by Marilyn Horne RN  Outcome: Progressing  1/22/2022 1857 by Marilyn Horne RN  Outcome: Progressing  Goal: Maintains or returns to baseline bowel function  Description: INTERVENTIONS:  - Assess bowel function  - Encourage oral fluids to ensure adequate hydration  - Administer IV fluids if ordered to ensure adequate hydration  - Administer ordered medications as needed  - Encourage mobilization and activity  - Consider nutritional services referral to assist patient with adequate nutrition and appropriate food choices  1/23/2022 0819 by Marilyn Horne RN  Outcome: Progressing  1/22/2022 1857 by Marilyn Horne RN  Outcome: Progressing  Goal: Maintains adequate nutritional intake  Description: INTERVENTIONS:  - Monitor percentage of each meal consumed  - Identify factors contributing to decreased intake, treat as appropriate  - Assist with meals as needed  - Monitor I&O, weight, and lab values if indicated  - Obtain nutrition services referral as needed  1/23/2022 0819 by Marilyn Horne RN  Outcome: Progressing  1/22/2022 1857 by Marilyn Horne RN  Outcome: Progressing  Goal: Oral mucous membranes remain intact  Description: INTERVENTIONS  - Assess oral mucosa and hygiene practices  - Implement preventative oral hygiene regimen  - Implement oral medicated treatments as ordered  - Initiate Nutrition services referral as needed  1/23/2022 0819 by Mery Willoughby RN  Outcome: Progressing  1/22/2022 1857 by Mery Willoughby RN  Outcome: Progressing     Problem: PAIN - ADULT  Goal: Verbalizes/displays adequate comfort level or baseline comfort level  Description: Interventions:  - Encourage patient to monitor pain and request assistance  - Assess pain using appropriate pain scale  - Administer analgesics based on type and severity of pain and evaluate response  - Implement non-pharmacological measures as appropriate and evaluate response  - Consider cultural and social influences on pain and pain management  - Notify physician/advanced practitioner if interventions unsuccessful or patient reports new pain  1/23/2022 0819 by Mery Willoughby RN  Outcome: Progressing  1/22/2022 1857 by Mery Willoughby RN  Outcome: Progressing     Problem: INFECTION - ADULT  Goal: Absence or prevention of progression during hospitalization  Description: INTERVENTIONS:  - Assess and monitor for signs and symptoms of infection  - Monitor lab/diagnostic results  - Monitor all insertion sites, i e  indwelling lines, tubes, and drains  - Monitor endotracheal if appropriate and nasal secretions for changes in amount and color  - [place] appropriate cooling/warming therapies per order  - Administer medications as ordered  - Instruct and encourage patient and family to use good hand hygiene technique  - Identify and instruct in appropriate isolation precautions for identified infection/condition  1/23/2022 0819 by Mery Willoughby RN  Outcome: Progressing  1/22/2022 1857 by Mery Willoughby RN  Outcome: Progressing     Problem: SAFETY ADULT  Goal: Patient will remain free of falls  Description: INTERVENTIONS:  - Educate patient/family on patient safety including physical limitations  - Instruct patient to call for assistance with activity   - Consult OT/PT to assist with strengthening/mobility   - Keep Call bell within reach  - Keep bed low and locked with side rails adjusted as appropriate  - Keep care items and personal belongings within reach  - Initiate and maintain comfort rounds  - Make Fall Risk Sign visible to staff  - Offer Toileting every 2 Hours, in advance of need  - Apply yellow socks and bracelet for high fall risk patients  - Consider moving patient to room near nurses station  1/23/2022 0819 by Mery Willoughby RN  Outcome: Progressing  1/22/2022 1857 by Mery Willoughby RN  Outcome: Progressing  Goal: Maintain or return to baseline ADL function  Description: INTERVENTIONS:  -  Assess patient's ability to carry out ADLs; assess patient's baseline for ADL function and identify physical deficits which impact ability to perform ADLs (bathing, care of mouth/teeth, toileting, grooming, dressing, etc )  - Assess/evaluate cause of self-care deficits   - Assess range of motion  - Assess patient's mobility; develop plan if impaired  - Assess patient's need for assistive devices and provide as appropriate  - Encourage maximum independence but intervene and supervise when necessary  - Involve family in performance of ADLs  - Assess for home care needs following discharge   - Consider OT consult to assist with ADL evaluation and planning for discharge  - Provide patient education as appropriate  1/23/2022 0819 by Mery Willoughby RN  Outcome: Progressing  1/22/2022 1857 by Mery Willoughby RN  Outcome: Progressing  Goal: Maintains/Returns to pre admission functional level  Description: INTERVENTIONS:  - Perform BMAT or MOVE assessment daily    - Set and communicate daily mobility goal to care team and patient/family/caregiver     - Collaborate with rehabilitation services on mobility goals if consulted  - Out of bed for toileting  - Record patient progress and toleration of activity level   1/23/2022 0819 by Roula Alanis RN  Outcome: Progressing  1/22/2022 1857 by Roula Alanis RN  Outcome: Progressing     Problem: DISCHARGE PLANNING  Goal: Discharge to home or other facility with appropriate resources  Description: INTERVENTIONS:  - Identify barriers to discharge w/patient and caregiver  - Arrange for needed discharge resources and transportation as appropriate  - Identify discharge learning needs (meds, wound care, etc )  - Arrange for interpretive services to assist at discharge as needed  - Refer to Case Management Department for coordinating discharge planning if the patient needs post-hospital services based on physician/advanced practitioner order or complex needs related to functional status, cognitive ability, or social support system  1/23/2022 0819 by Roula Alanis RN  Outcome: Progressing  1/22/2022 1857 by Roula Alanis RN  Outcome: Progressing     Problem: Knowledge Deficit  Goal: Patient/family/caregiver demonstrates understanding of disease process, treatment plan, medications, and discharge instructions  Description: Complete learning assessment and assess knowledge base    Interventions:  - Provide teaching at level of understanding  - Provide teaching via preferred learning methods  1/23/2022 0819 by Roula Alanis RN  Outcome: Progressing  1/22/2022 1857 by Roula Alanis RN  Outcome: Progressing     Problem: Prexisting or High Potential for Compromised Skin Integrity  Goal: Skin integrity is maintained or improved  Description: INTERVENTIONS:  - Identify patients at risk for skin breakdown  - Assess and monitor skin integrity  - Assess and monitor nutrition and hydration status  - Monitor labs   - Assess for incontinence   - Turn and reposition patient  - Assist with mobility/ambulation  - Relieve pressure over bony prominences  - Avoid friction and shearing  - Provide appropriate hygiene as needed including keeping skin clean and dry  - Evaluate need for skin moisturizer/barrier cream  - Collaborate with interdisciplinary team   - Patient/family teaching  - Consider wound care consult   Outcome: Progressing     Problem: MOBILITY - ADULT  Goal: Maintain or return to baseline ADL function  Description: INTERVENTIONS:  -  Assess patient's ability to carry out ADLs; assess patient's baseline for ADL function and identify physical deficits which impact ability to perform ADLs (bathing, care of mouth/teeth, toileting, grooming, dressing, etc )  - Assess/evaluate cause of self-care deficits   - Assess range of motion  - Assess patient's mobility; develop plan if impaired  - Assess patient's need for assistive devices and provide as appropriate  - Encourage maximum independence but intervene and supervise when necessary  - Involve family in performance of ADLs  - Assess for home care needs following discharge   - Consider OT consult to assist with ADL evaluation and planning for discharge  - Provide patient education as appropriate  1/23/2022 0819 by Garret Mead RN  Outcome: Progressing  1/22/2022 1857 by Garret Mead RN  Outcome: Progressing  Goal: Maintains/Returns to pre admission functional level  Description: INTERVENTIONS:  - Perform BMAT or MOVE assessment daily    - Set and communicate daily mobility goal to care team and patient/family/caregiver     - Collaborate with rehabilitation services on mobility goals if consulted  - Out of bed for toileting  - Record patient progress and toleration of activity level   1/23/2022 0819 by Garret Mead RN  Outcome: Progressing  1/22/2022 1857 by Garret Mead RN  Outcome: Progressing

## 2022-01-23 NOTE — ASSESSMENT & PLAN NOTE
· Adenocarcinoma 01/08/2021 currently being treated with chemotherapy at [facility] center  · Continue outpatient follow-up after resolution of acute issue

## 2022-01-23 NOTE — CONSULTS
Cardiology Consult H&P  Hilda Trujillo 74 y o  male MRN: [id number]  Unit/Bed#: -01 Encounter: [id number]  Physician Requesting Consult: Skye Louise DO  Reason for Consult: ADHF    HPI  63yo man with CKD stage 4 (baseline 2 6) likely due to DM2 and HTN, with prior outpatient plan for HD, adenocarcinoma of the colon (on LVC, 5FU), history of left foot osteomyelitis s/p amputation likely due to DM2, COPD(?) on chronic 4L O2, DM2, NICOLASA on CPAP, HLD, HTN, paroxysmal AF c/b SSS s/p Medtronic dual chamber PPM, moderate MS with mild/mod MR (MG 7 mmHg at 05/21//2021), mild/mod AS (Vmax 2 7 m/s on 5/24/2021), and HFpEF (60%), colon cancer who presented with SOB after 1L NS due to nausea at his outpatient chemotherapy session on 1/21/2022  Has had poor PO intake past 5 days, some LE edema, and dyspnea  No chest pain  On admission his Cr 5 89 and not making urine requiring HD with management per nephrology  At time of exam patient sleeping  Spoke with patient's wife who confirmed above  Prior Cardiac Imaging  - TTE (5/24/2021): EF 60%, mod increased wall thickness, LA mod dilated, mod MAS (MG 7 mmHg), mild/mod MR, mild/mod AS (Vmax 2 7 m/s) but visually looks no more than moderate, mild/mod AI  - EP device report (12/8/2021): AP 31%,  99 8, 51 1% AF burden  Battery life 8 8 years  >40% AAIR, 60% DDDR      Asessment  1  Acute decompensated HFpEF (60%) likely due to acute kidney injury on CKD stage 4 requiring HD  - Remove fluid via HD with management per nephrology  Currently net negative 290mL with 1 15L out and 960mL in  Daily standing weights and strict I/O ordered  Appropriately on 1 8L fluid restriction   - Outpatient cardiologist Dr Alayna Whitaker was planning for routine 6 month follow up echo  Ideally should be performed when euvolemic  It is reasonable to get one while the patient is here but overall this is not urgent and will not change patient management  Will not order this yet until patient euvolemic   If not done inpatient can safely be done as an outpatient  Would get with strain given plan for chemotherapy in setting of colon cancer    2  AF c/b SSS s/p MT dcPPM  - Eliquis 5mg q12H  Does not qualify for lower dose as weight >60kg and age not >80  ContinuemMetop tartrate 50mg q12H  Prior device interrogation was normal as above    3  HTN: amlodipine 10mg    4  NICOLASA: CPAP      Plan  1  Routine TTE with strain when euvolemic    2  Diuresis as per nephrology management with HD    Ruchi Daly MD  - PGY-6 Cardiology Fellow    ======================================================    Physical exam  Vitals: Blood pressure 154/63, pulse 60, temperature (!) 96 3 °F (35 7 °C), temperature source Oral, resp  rate 18, SpO2 94 %  Gen: well appearing  Psych: AOx3  Skin: intact  Cardiac: S1, S2, regular rate, no S3 or S4 appreciated  No murmurs  +2 PT, radial pulses  No peripheral edema No carotid bruits  Resp: CTABL  No crackles  MSK: 5/5 strength throughout muscle groups  Neuro: CN grossly intact  Sensory to light touch, pain, proprioception intact BL LE, UE  LN: no cervical LAD  Rheum: no joint deformities in UE or LE    ======================================================    TREADMILL STRESS  No results found for this or any previous visit      ----------------------------------------------------------------------------------------------  NUCLEAR STRESS TEST: No results found for this or any previous visit  No results found for this or any previous visit       --------------------------------------------------------------------------------  CATH:  No results found for this or any previous visit     --------------------------------------------------------------------------------  ECHO:   Results for orders placed during the hospital encounter of 05/24/21    Echo complete with contrast if indicated    Narrative  [facility]  [address]    [place], [address] [facility]  [phone]    Transthoracic Echocardiogram  2D, M-mode, Doppler, and Color Doppler    Study date:  24-May-2021    Patient: Augusto Watson  MR number: [id number]  Account number: [de-identified]  : 1955  Age: 77 years  Gender: Male  Status: Outpatient  Location: [facility] Echo 3  Height: 70 in  Weight: 208 6 lb  BP: 160/ 72 mmHg    Indications: aortic stenosis, mitral stenosis  Diagnoses: I34 2 - Nonrheumatic mitral (valve) stenosis, I35 2 - Nonrheumatic aortic (valve) stenosis with insufficiency    Sonographer:  Eugenia Kramer RDCS  Primary Physician:  Leonid Mcbride DO  Referring Physician:  Harsha rTan MD  Group:  [facility]  Interpreting Physician:  Harsha Tran MD    SUMMARY    LEFT VENTRICLE:  Systolic function was normal  Ejection fraction was estimated to be 60 %  There were no regional wall motion abnormalities  Wall thickness was moderately increased  LEFT ATRIUM:  The atrium was moderately dilated  MITRAL VALVE:  There was moderate to marked annular calcification  There was moderate thickening  There was moderately restricted mobility  There was moderate stenosis  Mean MV gradient 7 mm HG    MVA not accurate  There was mild to moderate regurgitation  AORTIC VALVE:  The valve was trileaflet  Leaflets exhibited moderately to markedly increased thickness  There was mild-moderate stenosis  Peak velocity across AV of 2 7 M/S  visually looks no more than moderate  There was mild to moderate regurgitation  TRICUSPID VALVE:  There was mild regurgitation  PERICARDIUM:  A small pericardial effusion was identified  There was no evidence of hemodynamic compromise  SUMMARY MEASUREMENTS  2D measurements:  Unspecified Anatomy:   RA Major was 5 6 cm   CW measurements:  Unspecified Anatomy:   AR Dec Dewey was 2 2 m/s2  AR Dec Time was 1922 5 ms  AR PHT was 490 7 ms  AR Vmax was 4 4 m/s  AR maxPG was 78 mmHg  AV Env  Ti was 426 3 ms   AV VTI was 73 7 cm  AV Vmax was 2 7 m/s    AV Vmean was 1 9 m/s  AV  maxPG was 29 4 mmHg  AV meanPG was 15 8 mmHg  MV VTI was 90 9 cm  MV Vmax was 2 2 m/s  MV Vmean was 1 3 m/s  MV maxPG was 19 8 mmHg  MV meanPG was 7 1 mmHg  TR Vmax was 3 6 m/s   TR maxPG was 50 5 mmHg  MM measurements:  Unspecified Anatomy:   TAPSE was 2 8 cm  PW measurements:  Unspecified Anatomy:   FITO (VTI) was 2 2 cm2  FITO Vmax was 2 cm2  AVAI (VTI) was 0 cm2/m2  AVAI Vmax was 0 cm2/m2  DVI was 0 6   E' Avg was 0 1 m/s  E' Lat was 0 1 m/s  E' Sept was 0 1 m/s  E/E' Avg was 33 7   E/E' Lat was 32 2   E/E' Sept was 35 3   LVOT Env  Ti was 391 1 ms  LVOT VTI was 46 3 cm  LVOT Vmax was 1 6 m/s  LVOT Vmean was 1 2 m/s  LVOT maxPG was 9 8 mmHg  LVOT meanPG was 6 3 mmHg  LVSI Dopp was 75 5 ml/m2  LVSV Dopp was 160 7 ml   MV A Ruben was  1 3 m/s  MV Dec [place] was 6 2 m/s2  MV DecT was 296 5 ms   MV E Ruben was 1 8 m/s  MV E/A Ratio was 1 4   MV PHT was 86 ms  MVA By PHT was 2 6 cm2   TV E' lat was 0 1 m/s  HISTORY: PRIOR HISTORY: diabetes mellitus type 2, obstructive sleep apnea, former smoker, COPD< pulmonary edema, peripheral vascular disease, sick sinus syndrome, paroxysmal atrial fibrillation, hypertension, aortic stenosis, mitral  stenosis, methicillin drug-resistant organism infection  PROCEDURE: The study was performed in the [facility] Echo 3  This was a routine study  The transthoracic approach was used  The study included complete 2D imaging, M-mode, complete spectral Doppler, and color Doppler  The heart rate was 65 bpm, at  the start of the study  Echocardiographic views were limited due to lung interference  Image quality was adequate  LEFT VENTRICLE: Size was normal  Systolic function was normal  Ejection fraction was estimated to be 60 %  There were no regional wall motion abnormalities  Wall thickness was moderately increased  DOPPLER: The ratio of early ventricular  filling to atrial contraction velocities was within the normal range   The deceleration time of the early transmitral flow velocity was increased  RIGHT VENTRICLE: The size was normal  Systolic function was normal  Wall thickness was normal     LEFT ATRIUM: The atrium was moderately dilated  RIGHT ATRIUM: Size was at the upper limits of normal  A pacing wire was present  MITRAL VALVE: There was moderate to marked annular calcification  There was moderate thickening  There was moderately restricted mobility  DOPPLER: There was moderate stenosis  Mean MV gradient 7 mm HG    MVA not accurate There was mild to  moderate regurgitation  AORTIC VALVE: The valve was trileaflet  Leaflets exhibited moderately to markedly increased thickness  DOPPLER: There was mild-moderate stenosis  Peak velocity across AV of 2 7 M/S  visually looks no more than moderate There was mild to  moderate regurgitation  TRICUSPID VALVE: The valve structure was normal  There was normal leaflet separation  DOPPLER: The transtricuspid velocity was within the normal range  There was no evidence for stenosis  There was mild regurgitation  Pulmonary artery  systolic pressure was moderately increased  Estimated peak PA pressure was 56 mmHg  PULMONIC VALVE: Leaflets exhibited normal thickness, no calcification, and normal cuspal separation  DOPPLER: The transpulmonic velocity was within the normal range  There was no regurgitation  PERICARDIUM: A small pericardial effusion was identified  There was no evidence of hemodynamic compromise  AORTA: The root exhibited upper normal size  SYSTEMIC VEINS: IVC: The inferior vena cava was normal in size  SYSTEM MEASUREMENT TABLES    2D  Ao Diam: 3 7 cm  EDV(Teich): 132 8 ml  IVSd: 1 4 cm  LA Diam: 5 cm  LVIDd: 5 2 cm  LVIDs: 3 3 cm  LVOT Diam: 2 1 cm  LVPWd: 1 4 cm  RVIDd: 3 5 cm    CW  AR Dec [place]: 2 2 m/s2  AR Dec Time: 1922 5 ms  AR PHT: 490 7 ms  AR Vmax: 4 4 m/s  AR maxP mmHg  AV Env  Ti: 426 3 ms  AV VTI: 73 7 cm  AV Vmax: 2 7 m/s  AV Vmean: 1 9 m/s  AV maxP 4 mmHg  AV meanPG: 15 8 mmHg  MV VTI: 90 9 cm  MV Vmax: 2 2 m/s  MV Vmean: 1 3 m/s  MV maxP 8 mmHg  MV meanP 1 mmHg  TR Vmax: 3 6 m/s  TR maxP 5 mmHg    MM  TAPSE: 2 8 cm    PW  FITO (VTI): 2 2 cm2  FITO Vmax: 2 cm2  AVAI (VTI): 0 cm2/m2  AVAI Vmax: 0 cm2/m2  DVI: 0 6  E' Av 1 m/s  E' Lat: 0 1 m/s  E' Sept: 0 1 m/s  E/E' Av 7  E/E' Lat: 32 2  E/E' Sept: 35 3  LVOT Env  Ti: 391 1 ms  LVOT VTI: 46 3 cm  LVOT Vmax: 1 6 m/s  LVOT Vmean: 1 2 m/s  LVOT maxP 8 mmHg  LVOT meanP 3 mmHg  LVSI Dopp: 75 5 ml/m2  LVSV Dopp: 160 7 ml  MV A Ruben: 1 3 m/s  MV Dec [place]: 6 2 m/s2  MV DecT: 296 5 ms  MV E Ruben: 1 8 m/s  MV E/A Ratio: 1 4  MV PHT: 86 ms  MVA By PHT: 2 6 cm2  TV E' lat: 0 1 m/s    [employer] Accredited Echocardiography Laboratory    Prepared and electronically signed by    Leandro Pierre MD  Signed [id number] 09:27:36    Results for orders placed during the hospital encounter of 20    ELBERT    Narrative  [address] [place] [address] 35  [place], [address]  [phone]    Transesophageal Echocardiogram  2D, Doppler, and Color Doppler    Study date:  15-Rohit-2020    Patient: Gerard Yost  MR number: [id number]  Account number: [de-identified]  : 1955  Age: 75 years  Gender: Male  Status: Inpatient  Location: Cath lab  Height: 70 in  Weight: 219 6 lb  BP: 159/ 58 mmHg    Indications: Bacteremia  Diagnoses: R78 81 - Bacteremia    Sonographer:  PEDRO PABLO Egna  Primary Physician:  Enid Mansfield DO  Referring Physician:  Jazmyne Greenwood DO  Group:  [facility]  RN:  Milana Rogers, RN BSN  Interpreting Physician:  Smitha Rees MD    IMPRESSIONS:  There was no diagnostic echocardiographic evidence for valvular vegetation or any vegetation over the visualized portion of the pacing wires      SUMMARY    SUMMARY:  The patient had significant hypoxia throughout the procedure, and the procedure was terminated early, without detailed quantification of the valvular disease  LEFT VENTRICLE:  Systolic function was normal  Ejection fraction was estimated to be 60 %  There were no regional wall motion abnormalities  LEFT ATRIUM:  The atrium was dilated  There was evidence of spontaneous echo contrast ("smoke")  MITRAL VALVE:  There was marked annular calcification  There was moderately reduced leaflet separation  There was moderate stenosis  There was mild regurgitation  Mean transmitral gradient was 8 mmHg  AORTIC VALVE:  The valve was trileaflet  Leaflets exhibited normal thickness, marked calcification, and mildly reduced cuspal separation  There was mild stenosis  There was mild regurgitation  TRICUSPID VALVE:  There was mild regurgitation  AORTA:  The root exhibited normal size and moderate fibrocalcific change  There was moderately severe, localized calcified atheroma in the proximal ascending aorta  PERICARDIUM:  A small pericardial effusion was identified  There was a left pleural effusion  HISTORY: PRIOR HISTORY: Congestive heart failure  Peripheral vascular disease  Atrial fibrillation  Risk factors: hypertension, diabetes, and hypercholesterolemia  PRIOR PROCEDURES: Pacemaker implantation  PROCEDURE: The procedure was performed in the catheterization laboratory  This was a routine study  The risks and alternatives of the procedure were explained to the patient and informed consent was obtained  The transesophageal approach  was used  The study included complete 2D imaging, limited spectral Doppler, color Doppler, and probe insertion (without interpretation)  by the attending cardiologist  Erin Moraes with ease  One intubation attempt(s)  Patient had significant  hypoxia after initial intubation, and probe was removed  Subsequently after improvement of hypoxia, he was reintubated with ease for a second time  There was no blood detected on the probe   This was a technically difficult study   MEDICATIONS: General anesthesia administered by anesthesia team     LEFT VENTRICLE: Size was normal  Systolic function was normal  Ejection fraction was estimated to be 60 %  There were no regional wall motion abnormalities  Wall thickness was normal     RIGHT VENTRICLE: The size was normal  Systolic function was normal  Wall thickness was normal     LEFT ATRIUM: The atrium was dilated  There was evidence of spontaneous echo contrast ("smoke")  APPENDAGE: The size was normal  No thrombus was identified  DOPPLER: The function was normal (normal emptying velocity)  ATRIAL SEPTUM: No defect or patent foramen ovale was identified  Doppler evaluation was performed  There was no right-to-left shunt, in the baseline state  RIGHT ATRIUM: Size was normal  A pacing wire was present  There was no associated thrombus or vegetation  MITRAL VALVE: There was marked annular calcification  Valve structure was normal  There was calcification, limited to the leaflet base, with moderate chordal involvement  There was moderately reduced leaflet separation  There was no  echocardiographic evidence of vegetation  DOPPLER: Transmitral velocity was increased due to valvular stenosis  There was moderate stenosis  There was mild regurgitation  AORTIC VALVE: The valve was trileaflet  Leaflets exhibited normal thickness, marked calcification, and mildly reduced cuspal separation  There was no echocardiographic evidence of vegetation  DOPPLER: There was mild stenosis  There was  mild regurgitation  TRICUSPID VALVE: The valve structure was normal  There was normal leaflet separation  There was no echocardiographic evidence of vegetation  DOPPLER: There was mild regurgitation  PULMONIC VALVE: Leaflets exhibited normal thickness, no calcification, and normal cuspal separation  There was no echocardiographic evidence of vegetation  DOPPLER: There was no significant regurgitation      PERICARDIUM: A small pericardial effusion was identified  There was a left pleural effusion  The pericardium was normal in appearance  AORTA: The root exhibited normal size and moderate fibrocalcific change  The ascending aorta was normal in size  The aortic arch was normal in size  The descending aorta was normal in size  There was moderately severe, localized calcified  atheroma in the proximal ascending aorta  There was no evidence for dissection  There was no evidence for aneurysm  SYSTEMIC VEINS: SVC: A pacing wire was present  PULMONARY VEINS: DOPPLER: Doppler flow pattern was normal in the pulmonary vein(s)  MEASUREMENT TABLES    DOPPLER MEASUREMENTS  Mitral valve   (Reference normals)  Mean gradient   8 mmHg   (--)    [facility]redited [facility] Laboratory    Prepared and electronically signed by    Blu Spencer MD  Signed 15-Rohit-2020 14:55:09    --------------------------------------------------------------------------------  HOLTER  No results found for this or any previous visit     --------------------------------------------------------------------------------  CAROTIDS  No results found for this or any previous visit  [unfilled]   ======================================================      Review of Systems  ROS as noted above, otherwise 12 point review of systems was performed and is negative       Historical Information   Past Medical History:   Diagnosis Date    Anemia     iron def    Arthritis     OA    Bruise of both arms     forearms and both hands    Bruises easily     Cancer ([facility] [address] ) 09/01/2021    colon    CHF (congestive heart failure) (HCC)     Chronic kidney disease     CPAP (continuous positive airway pressure) dependence     Diabetes mellitus ([facility] [address] )     Diabetic foot ulcer (HCC)     Eczema     Erectile dysfunction     Gout     Heart murmur     History of permanent cardiac pacemaker placement 11/2018    Hyperlipidemia     Hypertension     Hypoglycemia 3/8/2019    Murmur     Nephropathy     Osteoarthritis     Pacemaker 11/06/2018    PAD (peripheral artery disease) (HCC)     Seasonal allergies     Sleep apnea     Toe infection     bilat great toes    Vertigo     Walks frequently     Wears dentures     upper    Wears glasses      Past Surgical History:   Procedure Laterality Date    CARDIAC PACEMAKER PLACEMENT      CARPAL TUNNEL RELEASE Left     CARPAL TUNNEL RELEASE Right     CARPAL TUNNEL RELEASE      COLONOSCOPY  08/2020    COLONOSCOPY      FL GUIDED CENTRAL VENOUS ACCESS DEVICE INSERTION  1/11/2022    FOOT AMPUTATION Left 2/10/2020    Procedure: PARTIAL 1ST RAY AMPUTATION;  Surgeon: Prieto Watson DPM;  Location: [facility];  Service: Podiatry    INCISION AND DRAINAGE OF WOUND Left 1/12/2020    Procedure: INCISION AND DRAINAGE (I&D) EXTREMITY AND REMOVAL OF SESMOID BONE;  Surgeon: Magan Rogers DPM;  Location: [facility];  Service: Podiatry    IR PICC REPOSITION  1/14/2020    KNEE ARTHROSCOPY Left     KNEE ARTHROSCOPY Right     KNEE SURGERY      TN AMPUTATION TOE,I-P JT Bilateral 5/2/2017    Procedure: PARTIAL AMPUTATION RIGHT AND LEFT HALLUX ;  Surgeon: Prieto Watson DPM;  Location: [facility];  Service: Podiatry    TN AMPUTATION TOE,MT-P JT Left 7/25/2017    Procedure: 2ND TOE AMPUTATION;  Surgeon: Prieto Watson DPM;  Location: [facility];  Service: Podiatry    TN INSJ TUNNELED CTR VAD W/SUBQ PORT AGE 5 YR/> N/A 1/11/2022    Procedure: INSERTION VENOUS PORT ( PORT-A-CATH) IR;  Surgeon: Leigh Ortega DO;  [facility];  Service: Interventional Radiology    RESECTION LOW ANTERIOR LAPAROSCOPIC N/A 10/21/2021    Procedure: RESECTION LOW ANTERIOR LAPAROSCOPIC;  Surgeon: Garcia Fox MD;  Location: [facility];  Service: Colorectal    SHOULDER ARTHROSCOPY Left     with screws,RTC    SHOULDER SURGERY      TOE AMPUTATION Left 1/8/2020    Procedure: HALLUX AMPUTATION;  Surgeon: Magan Rogers DPM;  Location[facility];  Service: Podiatry    UPPER GASTROINTESTINAL ENDOSCOPY  2020    Intestinal metaplasia prepyloric    VASECTOMY       Social History     Substance and Sexual Activity   Alcohol Use Never    Alcohol/week: 0 0 standard drinks     Social History     Substance and Sexual Activity   Drug Use No     Social History     Tobacco Use   Smoking Status Former Smoker    Packs/day: 0 50    Years: 20 00    Pack years: 10 00    Types: Cigarettes    Start date: 1    Quit date:     Years since quittin 0   Smokeless Tobacco Never Used   Tobacco Comment    quit 10 years ago     Family History:   Family History   Problem Relation Age of Onset    Heart disease Father         passed away   Gillian Pall Hypertension Father     Pulmonary embolism Father     Hypertension Mother         assed away       Meds/Allergies   Hospital Medications:   Current Facility-Administered Medications   Medication Dose Route Frequency    amLODIPine (NORVASC) tablet 10 mg  10 mg Oral Daily    apixaban (ELIQUIS) tablet 5 mg  5 mg Oral Q12H    famotidine (PEPCID) tablet 20 mg  20 mg Oral Daily    fish oil capsule 1,000 mg  1,000 mg Oral BID    insulin lispro (HumaLOG) 100 units/mL subcutaneous injection 1-6 Units  1-6 Units Subcutaneous TID AC    insulin lispro (HumaLOG) 100 units/mL subcutaneous injection 1-6 Units  1-6 Units Subcutaneous HS    metoprolol tartrate (LOPRESSOR) tablet 50 mg  50 mg Oral Q12H    ondansetron (ZOFRAN) injection 4 mg  4 mg Intravenous Q4H PRN    prochlorperazine (COMPAZINE) tablet 5 mg  5 mg Oral Q6H PRN    sodium bicarbonate tablet 1,300 mg  1,300 mg Oral TID after meals    tamsulosin (FLOMAX) capsule 0 4 mg  0 4 mg Oral Daily With Dinner     Home Medications:   Medications Prior to Admission   Medication    allopurinol (ZYLOPRIM) 300 mg tablet    amLODIPine (NORVASC) 10 mg tablet    apixaban (ELIQUIS) 5 mg    Dupilumab (Dupixent) 300 MG/2ML SOPN    famotidine (PEPCID) 40 MG tablet    Insulin Degludec-Liraglutide (Xultophy) 100 units-3 6 mg/mL injection pen    Insulin Pen Needle (BD Pen Needle Zenaida U/F) 32G X 4 MM MISC    metoprolol tartrate (LOPRESSOR) 50 mg tablet    Multiple Vitamin (MULTIVITAMIN) tablet    omega-3-acid ethyl esters (LOVAZA) 1 g capsule    ondansetron (ZOFRAN) 8 mg tablet    OneTouch Ultra test strip    simvastatin (ZOCOR) 20 mg tablet    sodium bicarbonate 650 mg tablet    tamsulosin (FLOMAX) 0 4 mg    torsemide (DEMADEX) 20 mg tablet    acetaminophen (TYLENOL) 325 mg tablet       Allergies   Allergen Reactions    Invokana [Canagliflozin] Other (See Comments)     Caused circulation problems    Lamisil [Terbinafine] Blisters     Wife states " His skin peeled from head to toe"    Other Swelling     Pomegranate - facial swelling, no swelling of tongue, esophagus  Adhesive tape   Latex Rash       Objective   Vitals: Blood pressure 154/63, pulse 60, temperature (!) 96 3 °F (35 7 °C), temperature source Oral, resp  rate 18, SpO2 94 %  Orthostatic Blood Pressures      Most Recent Value   Blood Pressure 154/63 filed at 01/23/2022 0023   Patient Position - Orthostatic VS Lying filed at 01/23/2022 7993            Intake/Output Summary (Last 24 hours) at 1/23/2022 0850  Last data filed at 1/23/2022 0530  Gross per 24 hour   Intake 960 ml   Output 1250 ml   Net -290 ml       Invasive Devices  Report    Central Venous Catheter Line            Port A Cath Right Subclavian -- days          Hemodialysis Catheter            HD Temporary Double Catheter <1 day          Drain            Urethral Catheter Straight-tip 18 Fr  89 days                Physical Exam    Lab Results: I have personally reviewed pertinent lab results      Results from last 7 days   Lab Units 01/23/22  0529 01/22/22  0646 01/21/22  1359   WBC Thousand/uL 12 50* 12 45* 13 32*   HEMOGLOBIN g/dL 11 5* 12 0 12 2   HEMATOCRIT % 33 7* 34 8* 37 2   PLATELETS Thousands/uL 279 266 320     Results from last 7 days   Lab Units 01/23/22  0529 01/22/22  2042 01/22/22  1441   POTASSIUM mmol/L 4 8 5 0 6 0*   CHLORIDE mmol/L 91* 94* 94*   CO2 mmol/L 14* 17* 17*   BUN mg/dL 125* 118* 118*   CREATININE mg/dL 6 14* 5 96* 5 89*   CALCIUM mg/dL 9 3 9 3 9 4     Results from last 7 days   Lab Units 01/21/22  1359   INR  1 77*   PTT seconds 31     Results from last 7 days   Lab Units 01/22/22  0646   MAGNESIUM mg/dL 2 4

## 2022-01-23 NOTE — ASSESSMENT & PLAN NOTE
· Potasium 6 0 yeasterday, specimen hemolyzed  · Today is 4 8 after hyperkalemia treatment  · Plan for dialysis today  · Recheck metabolic panel and trend potassium

## 2022-01-24 ENCOUNTER — APPOINTMENT (INPATIENT)
Dept: ULTRASOUND IMAGING | Facility: HOSPITAL | Age: 67
DRG: 682 | End: 2022-01-24
Payer: MEDICARE

## 2022-01-24 ENCOUNTER — APPOINTMENT (INPATIENT)
Dept: DIALYSIS | Facility: HOSPITAL | Age: 67
DRG: 682 | End: 2022-01-24
Payer: MEDICARE

## 2022-01-24 LAB
ANION GAP SERPL CALCULATED.3IONS-SCNC: 14 MMOL/L (ref 4–13)
BUN SERPL-MCNC: 93 MG/DL (ref 5–25)
CALCIUM SERPL-MCNC: 8.5 MG/DL (ref 8.4–10.2)
CHLORIDE SERPL-SCNC: 92 MMOL/L (ref 96–108)
CO2 SERPL-SCNC: 26 MMOL/L (ref 21–32)
CREAT SERPL-MCNC: 4.87 MG/DL (ref 0.6–1.3)
ERYTHROCYTE [DISTWIDTH] IN BLOOD BY AUTOMATED COUNT: 15.9 % (ref 11.6–15.1)
GFR SERPL CREATININE-BSD FRML MDRD: 11 ML/MIN/1.73SQ M
GLUCOSE SERPL-MCNC: 152 MG/DL (ref 65–140)
GLUCOSE SERPL-MCNC: 158 MG/DL (ref 65–140)
GLUCOSE SERPL-MCNC: 178 MG/DL (ref 65–140)
HCT VFR BLD AUTO: 27.8 % (ref 36.5–49.3)
HGB BLD-MCNC: 9.6 G/DL (ref 12–17)
MAGNESIUM SERPL-MCNC: 2.5 MG/DL (ref 1.9–2.7)
MCH RBC QN AUTO: 30.2 PG (ref 26.8–34.3)
MCHC RBC AUTO-ENTMCNC: 34.5 G/DL (ref 31.4–37.4)
MCV RBC AUTO: 87 FL (ref 82–98)
MRSA NOSE QL CULT: NORMAL
PHOSPHATE SERPL-MCNC: 6.6 MG/DL (ref 2.3–4.1)
PLATELET # BLD AUTO: 179 THOUSANDS/UL (ref 149–390)
PMV BLD AUTO: 9.3 FL (ref 8.9–12.7)
POTASSIUM SERPL-SCNC: 3.5 MMOL/L (ref 3.5–5.3)
RBC # BLD AUTO: 3.18 MILLION/UL (ref 3.88–5.62)
SODIUM SERPL-SCNC: 132 MMOL/L (ref 135–147)
WBC # BLD AUTO: 8.34 THOUSAND/UL (ref 4.31–10.16)

## 2022-01-24 PROCEDURE — 94760 N-INVAS EAR/PLS OXIMETRY 1: CPT

## 2022-01-24 PROCEDURE — 83735 ASSAY OF MAGNESIUM: CPT | Performed by: NURSE PRACTITIONER

## 2022-01-24 PROCEDURE — 99233 SBSQ HOSP IP/OBS HIGH 50: CPT | Performed by: NURSE PRACTITIONER

## 2022-01-24 PROCEDURE — 97163 PT EVAL HIGH COMPLEX 45 MIN: CPT

## 2022-01-24 PROCEDURE — 82948 REAGENT STRIP/BLOOD GLUCOSE: CPT

## 2022-01-24 PROCEDURE — 84100 ASSAY OF PHOSPHORUS: CPT | Performed by: NURSE PRACTITIONER

## 2022-01-24 PROCEDURE — 97166 OT EVAL MOD COMPLEX 45 MIN: CPT

## 2022-01-24 PROCEDURE — 90935 HEMODIALYSIS ONE EVALUATION: CPT | Performed by: INTERNAL MEDICINE

## 2022-01-24 PROCEDURE — 99232 SBSQ HOSP IP/OBS MODERATE 35: CPT | Performed by: INTERNAL MEDICINE

## 2022-01-24 PROCEDURE — 80048 BASIC METABOLIC PNL TOTAL CA: CPT | Performed by: NURSE PRACTITIONER

## 2022-01-24 PROCEDURE — 94660 CPAP INITIATION&MGMT: CPT

## 2022-01-24 PROCEDURE — 5A1D70Z PERFORMANCE OF URINARY FILTRATION, INTERMITTENT, LESS THAN 6 HOURS PER DAY: ICD-10-PCS | Performed by: NURSE PRACTITIONER

## 2022-01-24 PROCEDURE — 85027 COMPLETE CBC AUTOMATED: CPT | Performed by: NURSE PRACTITIONER

## 2022-01-24 PROCEDURE — 76770 US EXAM ABDO BACK WALL COMP: CPT

## 2022-01-24 RX ORDER — SACCHAROMYCES BOULARDII 250 MG
250 CAPSULE ORAL 2 TIMES DAILY
Status: DISCONTINUED | OUTPATIENT
Start: 2022-01-24 | End: 2022-01-28 | Stop reason: HOSPADM

## 2022-01-24 RX ORDER — DIPHENOXYLATE HYDROCHLORIDE AND ATROPINE SULFATE 2.5; .025 MG/1; MG/1
1 TABLET ORAL 4 TIMES DAILY PRN
Status: DISCONTINUED | OUTPATIENT
Start: 2022-01-24 | End: 2022-01-28 | Stop reason: HOSPADM

## 2022-01-24 RX ORDER — PROCHLORPERAZINE MALEATE 5 MG/1
5 TABLET ORAL EVERY 6 HOURS PRN
Status: DISCONTINUED | OUTPATIENT
Start: 2022-01-24 | End: 2022-01-28 | Stop reason: HOSPADM

## 2022-01-24 RX ADMIN — OMEGA-3 FATTY ACIDS CAP 1000 MG 1000 MG: 1000 CAP at 13:51

## 2022-01-24 RX ADMIN — METOPROLOL TARTRATE 50 MG: 50 TABLET, FILM COATED ORAL at 05:56

## 2022-01-24 RX ADMIN — Medication 250 MG: at 20:33

## 2022-01-24 RX ADMIN — CALCIUM ACETATE 667 MG: 667 CAPSULE ORAL at 16:11

## 2022-01-24 RX ADMIN — AMLODIPINE BESYLATE 10 MG: 10 TABLET ORAL at 13:52

## 2022-01-24 RX ADMIN — INSULIN LISPRO 1 UNITS: 100 INJECTION, SOLUTION INTRAVENOUS; SUBCUTANEOUS at 23:00

## 2022-01-24 RX ADMIN — FAMOTIDINE 20 MG: 20 TABLET ORAL at 13:52

## 2022-01-24 RX ADMIN — OMEGA-3 FATTY ACIDS CAP 1000 MG 1000 MG: 1000 CAP at 17:33

## 2022-01-24 RX ADMIN — TAMSULOSIN HYDROCHLORIDE 0.4 MG: 0.4 CAPSULE ORAL at 16:11

## 2022-01-24 RX ADMIN — APIXABAN 5 MG: 5 TABLET, FILM COATED ORAL at 17:33

## 2022-01-24 RX ADMIN — ONDANSETRON 4 MG: 2 INJECTION INTRAMUSCULAR; INTRAVENOUS at 05:57

## 2022-01-24 RX ADMIN — INSULIN LISPRO 1 UNITS: 100 INJECTION, SOLUTION INTRAVENOUS; SUBCUTANEOUS at 16:11

## 2022-01-24 RX ADMIN — METOPROLOL TARTRATE 50 MG: 50 TABLET, FILM COATED ORAL at 16:11

## 2022-01-24 RX ADMIN — DIPHENOXYLATE HYDROCHLORIDE AND ATROPINE SULFATE 1 TABLET: 2.5; .025 TABLET ORAL at 20:33

## 2022-01-24 RX ADMIN — APIXABAN 5 MG: 5 TABLET, FILM COATED ORAL at 05:57

## 2022-01-24 NOTE — PROGRESS NOTES
[address]  Progress Note Gregoria Rocha 1955, 74 y o  male MRN: [id number]  Unit/Bed#: -01 Encounter: [id number]  Primary Care Provider: Roberta Ward DO   Date and time admitted to hospital: 1/21/2022  1:36 PM    SSS (sick sinus syndrome) ([facility] )  Assessment & Plan  -s/p Medtronic dual-chamber permanent pacemaker  -continue monitor    Hypertension  Assessment & Plan  -blood pressures appear reasonably well controlled  -continue current medical therapy with amlodipine and metoprolol  -consult patient on dietary and lifestyle modifications  PAF (paroxysmal atrial fibrillation) ([facility] )  Assessment & Plan  -currently appears to be paced on telemetry with underlying atrial flutter heart rate 60 on telemetry  -continue current anticoagulation with Eliquis 5 mg twice daily  -continue metoprolol tartrate 50 mg twice daily  -continue monitor patient on telemetry at this time  Acute kidney injury superimposed on chronic kidney disease [facility])  Assessment & Plan  Lab Results   Component Value Date    EGFR 11 01/24/2022    EGFR 8 01/23/2022    EGFR 8 01/22/2022    CREATININE 4 87 (H) 01/24/2022    CREATININE 6 14 (H) 01/23/2022    CREATININE 5 96 (H) 01/22/2022   -currently being followed by Nephrology  -volume management per Nephrology  -patient did require straight catheterization overnight for significant amount of urine output if agreeable may also need urology referral    * Acute on chronic diastolic congestive heart failure ([facility] )  Assessment & Plan  Wt Readings from Last 3 Encounters:   01/24/22 93 7 kg (206 lb 9 1 oz)   01/18/22 91 7 kg (202 lb 2 6 oz)   01/18/22 91 6 kg (201 lb 14 4 oz)         -symptoms currently improved  -continue current medical therapy  -continue monitor patient clinically at this time      Outpatient Cardiologist: Elkin Norman      Subjective:   Patient seen and examined    Per patient he notes overall feeling better with improvement in his shortness of breath and denies any chest pain, palpitations, lightheadedness or dizziness, loss of consciousness  Summary comments:  -continue current medical therapy with overall volume management per Nephrology in the setting of significant renal dysfunction  Patient does appear to have been straight cath for 800 mL per nursing chart this morning  If necessary could consider urology referral     Telemetry/ECG/Cardiac testing:   Currently appears ventricularly paced on telemetry with underlying atrial flutter  Vitals: Blood pressure 138/62, pulse 60, temperature 98 3 °F (36 8 °C), resp  rate 18, weight 93 7 kg (206 lb 9 1 oz), SpO2 95 % ,   Orthostatic Blood Pressures      Most Recent Value   Blood Pressure 138/62 filed at 01/24/2022 0758   Patient Position - Orthostatic VS Lying filed at 01/24/2022 0308      ,   Weight (last 2 days)     Date/Time Weight    01/24/22 0600 93 7 (206 57)    01/24/22 0527 93 7 (206 57)          Physical Exam:  Physical Exam  Vitals reviewed  Constitutional:       General: He is not in acute distress  Appearance: He is obese  He is not diaphoretic  HENT:      Head: Normocephalic and atraumatic  Comments: Nasal cannula oxygen in place  Eyes:      General:         Right eye: No discharge  Left eye: No discharge  Neck:      Comments: Trachea midline, slightly elevated JVD  Cardiovascular:      Rate and Rhythm: Normal rate and regular rhythm  Heart sounds: Murmur heard  No friction rub  Pulmonary:      Effort: Pulmonary effort is normal  No respiratory distress  Breath sounds: No wheezing  Comments: Decreased breath sounds bilaterally  Abdominal:      General: Bowel sounds are normal       Palpations: Abdomen is soft  Tenderness: There is no abdominal tenderness  Musculoskeletal:      Right lower leg: Edema (1+) present  Left lower leg: Edema ( 1+) present  Skin:     General: Skin is warm and dry     Neurological:      Mental Status: He is alert        Comments: Awake, alert, able to answer questions appropriately, able to move extremities bilaterally   Psychiatric:         Mood and Affect: Mood normal          Behavior: Behavior normal            Medications:      Current Facility-Administered Medications:     amLODIPine (NORVASC) tablet 10 mg, 10 mg, Oral, Daily, PAT Petit    apixaban (ELIQUIS) tablet 5 mg, 5 mg, Oral, Q12H, PAT Lee, 5 mg at 01/24/22 0557    calcium acetate (PHOSLO) capsule 667 mg, 667 mg, Oral, Daily With PAT Ordoñez, 667 mg at 01/23/22 1704    famotidine (PEPCID) tablet 20 mg, 20 mg, Oral, Daily, PAT Lee, 20 mg at 01/23/22 0802    fish oil capsule 1,000 mg, 1,000 mg, Oral, BID, PAT Lee, 1,000 mg at 01/23/22 1704    insulin lispro (HumaLOG) 100 units/mL subcutaneous injection 1-6 Units, 1-6 Units, Subcutaneous, TID AC, 2 Units at 01/23/22 1703 **AND** Fingerstick Glucose (POCT), , , TID AC, PAT Lee    insulin lispro (HumaLOG) 100 units/mL subcutaneous injection 1-6 Units, 1-6 Units, Subcutaneous, HS, PAT Lee, 3 Units at 01/23/22 2331    metoprolol tartrate (LOPRESSOR) tablet 50 mg, 50 mg, Oral, Q12H, PAT Lee, 50 mg at 01/24/22 0556    ondansetron (ZOFRAN) injection 4 mg, 4 mg, Intravenous, Q4H PRN, PAT Gomez, 4 mg at 01/24/22 0557    prochlorperazine (COMPAZINE) tablet 5 mg, 5 mg, Oral, Q6H PRN, Stephania Driscoll PA-C    tamsulosin [facility]) capsule 0 4 mg, 0 4 mg, Oral, Daily With Dinner, PAT Lee, 0 4 mg at 01/23/22 1704     Labs & Results:    Troponins:    Results from last 7 days   Lab Units 01/21/22  1841 01/21/22  1655 01/21/22  1359   HS TNI 0HR ng/L  --   --  53   HS TNI 2HR ng/L  --  60  --    HSTNI D2 ng/L  --  7  --    HS TNI 4HR ng/L 64  --   --    HSTNI D4 ng/L 11  --   --         BNP:   Results from last 6 Months   Lab Units 01/21/22  1359   BNP pg/mL 1,067*     CBC with diff: Results from last 7 days   Lab Units 01/24/22  0506 01/23/22  0529   WBC Thousand/uL 8 34 12 50*   HEMOGLOBIN g/dL 9 6* 11 5*   HEMATOCRIT % 27 8* 33 7*   MCV fL 87 91   PLATELETS Thousands/uL 179 279     TSH:     CMP:   Results from last 7 days   Lab Units 01/24/22  0506 01/23/22  0529 01/22/22  1441 01/22/22  0646 01/21/22  1359 01/21/22  1359   POTASSIUM mmol/L 3 5 4 8   < > 5 6*   < > 5 4*   CHLORIDE mmol/L 92* 91*   < > 95*   < > 97   CO2 mmol/L 26 14*   < > 17*   < > 14*   BUN mg/dL 93* 125*   < > 112*   < > 98*   CREATININE mg/dL 4 87* 6 14*   < > 5 94*   < > 5 53*   AST U/L  --   --   --  148*  --  37   ALT U/L  --   --   --  163*  --  37   EGFR ml/min/1 73sq m 11 8   < > 8   < > 9    < > = values in this interval not displayed       Lipid Profile:     Coags:   Results from last 7 days   Lab Units 01/21/22  1359   INR  1 77*

## 2022-01-24 NOTE — RESPIRATORY THERAPY NOTE
01/23/22 4955   Respiratory Assessment   Assessment Type Assess only   General Appearance Alert; Awake   Respiratory Pattern Dyspnea with exertion   Chest Assessment Chest expansion symmetrical   Bilateral Breath Sounds Clear;Diminished   Resp Comments pt still slightly nauseated but willing to use cpap placed on cpap at home settings 11 cmh2o sancho well    O2 Device vapotherm    Non-Invasive Information   O2 Interface Device Full face mask  (small )   Non-Invasive Ventilation Mode CPAP  (vivo )   $ Intermittent NIV Yes   SpO2 94 %   $ Pulse Oximetry Spot Check Charge Completed   Non-Invasive Settings   PEEP/CPAP (cm H2O) 11   Non-Invasive Readings   Skin Intervention Skin intact   Total Rate 16   Spontaneous Vt (mL) 650   I/E Ratio (Obs) 1:1 8   Leak (lpm) 41   Non-Invasive Alarms   Insp Pressure Low (cm H20) 4   Vt Low (mL) 100   High Resp Rate (BPM) 40 BPM   Low Resp Rate (BPM) 4 BPM

## 2022-01-24 NOTE — ASSESSMENT & PLAN NOTE
· Adenocarcinoma 01/08/2021 currently being treated with chemotherapy at [facility] center  · Continue outpatient follow-up after resolution of acute issue

## 2022-01-24 NOTE — ASSESSMENT & PLAN NOTE
-blood pressures appear reasonably well controlled  -continue current medical therapy with amlodipine and metoprolol  -consult patient on dietary and lifestyle modifications

## 2022-01-24 NOTE — PLAN OF CARE
Problem: PHYSICAL THERAPY ADULT  Goal: Performs mobility at highest level of function for planned discharge setting  See evaluation for individualized goals  Description: Treatment/Interventions: Functional transfer training,LE strengthening/ROM,Therapeutic exercise,Endurance training,Patient/family training,Equipment eval/education,Bed mobility,Gait training,Spoke to nursing  Equipment Recommended: Kimberley Regalado       See flowsheet documentation for full assessment, interventions and recommendations  Note: Prognosis: Good  Problem List: Decreased strength,Decreased endurance,Impaired balance,Decreased mobility,Decreased coordination,Obesity  Assessment: Pt is 74 y o  male seen for PT evaluation s/p admit to [facility] on 1/21/2022 w/ Acute on chronic diastolic congestive heart failure ([facility] )  PT consulted to assess pt's functional mobility and d/c needs  Order placed for PT eval and tx, w/ up w/ A order  Comorbidities affecting pt's physical performance at time of assessment include: weakness,acute on chronic CHF, colon CA with active treatment, diabetic L foot ulcer,hyponatremia, HTN, PAD, type 2 DM  PTA, pt was independent w/ all functional mobility w/ o AD usage  Personal factors affecting pt at time of IE include: stairs to enter home, inability to ambulate household distances, inability to navigate community distances, inability to navigate level surfaces w/o external assistance, unable to perform dynamic tasks in community, unable to perform physical activity, limited insight into impairments, inability to perform IADLs and inability to perform ADLs  Please find objective findings from PT assessment regarding body systems outlined above with impairments and limitations including weakness, impaired balance, decreased endurance, impaired coordination, gait deviations, decreased activity tolerance, decreased functional mobility tolerance, fall risk and decreased skin integrity    From PT/mobility standpoint, recommendation at time of d/c would be home with home health rehabilitation pending progress in order to facilitate return to PLOF  PT Discharge Recommendation: Home with home health rehabilitation          See flowsheet documentation for full assessment

## 2022-01-24 NOTE — PROGRESS NOTES
Progress Note - Nephrology   Chidi Herrera 74 y o  male MRN: [id number]  Unit/Bed#: -01 Encounter: [id number]    A/P:  1  Hemodialysis dependent acute kidney injury POA   - Admitted from the infusion center due to nausea and weakness with chemotherapy  He was  Found to have pulmonary edema    - He is seen during his second dialysis session   HEMODIALYSIS PROCEDURE NOTE  The patient was seen and examined on hemodialysis  Time: 3 hours  Sodium: 135 Blood flow: 250   Dialyzer: F160 Potassium: 4 Dialysate flow: 375   Access: right IJ temporary catheter Bicarbonate: 40 Ultrafiltration goal: 1   Medications on HD: none   Blood pressure is 144/79  Tolerating treatment well   - Next treatment tomorrow 1/25/22    2  Chronic kidney disease stage 4   - Followed by Dr Avni Huber   - Has history of diabetes mellitus for 15 years with diabetic nephropathy     3  Pulmonary edema   - - Admitted with flash pulmonary edema   - has a history of 60% left renal artery stenosis   -  Will ultrafilter one kg today and increase goal tomorrow    4  Elevated anion gap metabolic acidosis   -  Improving with dialysis   - Bicarbonate has risen to 26 mmol/liter    5  Urinary retention   - Straight cathed for one liter yesterday   - Will check PVR    6  Peripheral arterial disease   - Had a skin graft placed on left foot 2 weeks ago    7  Colon cancer   - Had a resection one month ago   - First chemo treatment PTA --> nausea   - Has a port in the right chest    8  Hypertension    - Blood pressure is controlled   - Has chronic diastolic HF with acute decompensation on admission    9/ DM-2 insulin requiring with chronic kidney disease   - Sugars are being covered    10  Hypokalemia   - Receiving a high potassium bath on dialysis    11   Secondary hyperparathyroidism of renal origin   - Phosphorus is declining from 9 1 to 6 6 with dialysis and calcium acetate 667 with meals tid      Follow up reason for today's visit:     Acute on chronic diastolic congestive heart failure [facility])    Patient Active Problem List   Diagnosis    Bilateral leg edema    Diabetic ulcer of left foot (HCC)    Easy bruising    Eczema    Erectile dysfunction of non-organic origin    Gout    Hyperlipidemia    Uremic acidosis    Arthritis    Peripheral arterial disease (HCC)    PVCs (premature ventricular contractions)    Rhinitis    Systolic murmur    Vertigo    Complete heart block (HCC)    Metabolic acidosis    Acute kidney injury ([address] )    Other hyperlipidemia    PAF [facility]paroxysmal atrial fibrillation) ([facility])    Persistent proteinuria    Volume overload    Urinary retention    NICOLASA (obstructive sleep apnea)    Type 2 diabetes mellitus with chronic kidney disease, with long-term current use of insulin ([facility])    Hypertension    Stage 4 chronic kidney disease ([facility])    Nonrheumatic aortic valve stenosis    Anemia    Fever    Mitral valve stenosis    Abnormal CT of the chest    Hyperkalemia    Acute pulmonary edema (HCC)    Chronic diastolic (congestive) heart failure ([facility])    Left renal artery stenosis (HCC)    S/P amputation of lesser toe, left (HCC)    S/P amputation of lesser toe, right (HCC)    Elevated troponin I level    Staphylococcus aureus bacteremia    Type 2 diabetes mellitus with diabetic neuropathy, without long-term current use of insulin (HCC)    Hyponatremia    Iron deficiency anemia    Anxiety    Osteomyelitis of left foot (HCC)    Amputation of left great toe (HCC)    Multiple drug resistant organism (MDRO) culture positive    Renovascular hypertension    SSS (sick sinus syndrome) (HCC)    Chronic obstructive pulmonary disease (HCC)    Absence of toe (HCC)    Chronic painful diabetic neuropathy (HCC)    Onychomycosis    Colon cancer (HCC)    Acute kidney injury superimposed on chronic kidney disease (HCC)    RSV (respiratory syncytial virus pneumonia)    Chemotherapy-induced neutropenia (HCC)    Nausea    Acute on chronic diastolic congestive heart failure (HCC)         Subjective:   Seen on dialysis  No headaches, dizziness or chest pain  Had dyspnea after returning from the bathroom during dialysis  He required 2lpm which is just increased to 2 5 lpm due to continued dyspnea at rest  No abdominal pain  Has intermittent nausea, no vomiting or diarrhea  Had urinary retention requiring straight cath  Appetite fair  Says he has no difficulty voiding - no dysuria or hesitancy    Objective:     Vitals: Blood pressure 149/82, pulse 60, temperature 98 3 °F (36 8 °C), resp  rate 18, weight 93 7 kg (206 lb 9 1 oz), SpO2 95 %  ,Body mass index is 29 64 kg/m²  Weight (last 2 days)     Date/Time Weight    01/24/22 0600 93 7 (206 57)    01/24/22 0527 93 7 (206 57)            Intake/Output Summary (Last 24 hours) at 1/24/2022 1145  Last data filed at 1/24/2022 1115  Gross per 24 hour   Intake 1000 ml   Output 3609 ml   Net -2609 ml     I/O last 3 completed shifts: In: 500 [I V :500]  Out: 7649 [Urine:3252; Other:1507]    HD Temporary Double Catheter (Active)   Reasons to continue HD Cath Treatment Therapy 01/23/22 1500   Goal for Removal Other (comment) 01/23/22 1500   Line Necessity Reviewed Yes, reviewed with provider 01/23/22 1500   Site Assessment WDL 01/23/22 2050   Proximal Lumen Status Blood return noted;Capped;Flushed 01/24/22 0935   Distal Lumen Status Blood return noted;Capped;Flushed 01/24/22 [address]  [facility] [address] changed; Connections checked and tightened 01/24/22 0935   Dressing Type Chlorhexidine dressing 01/24/22 0935   Dressing Status Clean;Dry; Intact 01/24/22 0935   Dressing Change Due 01/29/22 01/23/22 1500       Physical Exam: /82   Pulse 60   Temp 98 3 °F (36 8 °C)   Resp 18   Wt 93 7 kg (206 lb 9 1 oz)   SpO2 95%   BMI 29 64 kg/m²     General Appearance:    Alert, cooperative, no distress, appears stated age   Head:    Normocephalic, without obvious abnormality, atraumatic   Eyes:    Conjunctiva/corneas clear Ears:    Normal external ears   Nose:   Nares normal, septum midline, mucosa normal, no drainage    or sinus tenderness   Throat:   Lips, mucosa, and tongue normal; teeth and gums normal   Neck:   Supple, symmetrical, trachea midline, no adenopathy;        thyroid:  No enlargement/tenderness/nodules; no carotid    bruit or JVD   Back:     Symmetric, no curvature, ROM normal, no CVA tenderness   Lungs:     Clear to auscultation bilaterally, respirations unlabored   Chest wall:    No tenderness or deformity   Heart:    Regular rate and rhythm, S1 and S2 normal, no murmur, rub   or gallop   Abdomen:     Soft, non-tender, bowel sounds active   Extremities:   Extremities -left foot bandaged, atraumatic, no cyanosis has trace edema   Skin:   Skin color, texture, turgor normal, no rashes or lesions   Lymph nodes:   Cervical normal   Neurologic:   CNII-XII intact            Lab, Imaging and other studies: I have personally reviewed pertinent labs  CBC:   Lab Results   Component Value Date    WBC 8 34 01/24/2022    HGB 9 6 (L) 01/24/2022    HCT 27 8 (L) 01/24/2022    MCV 87 01/24/2022     01/24/2022    MCH 30 2 01/24/2022    MCHC 34 5 01/24/2022    RDW 15 9 (H) 01/24/2022    MPV 9 3 01/24/2022     CMP:   Lab Results   Component Value Date    K 3 5 01/24/2022    CL 92 (L) 01/24/2022    CO2 26 01/24/2022    BUN 93 (H) 01/24/2022    CREATININE 4 87 (H) 01/24/2022    CALCIUM 8 5 01/24/2022    EGFR 11 01/24/2022           Results from last 7 days   Lab Units 01/24/22  0506 01/23/22  0529 01/22/22  2042 01/22/22  1441 01/22/22  0646 01/21/22  1359 01/21/22  1359   POTASSIUM mmol/L 3 5 4 8 5 0   < > 5 6*   < > 5 4*   CHLORIDE mmol/L 92* 91* 94*   < > 95*   < > 97   CO2 mmol/L 26 14* 17*   < > 17*   < > 14*   BUN mg/dL 93* 125* 118*   < > 112*   < > 98*   CREATININE mg/dL 4 87* 6 14* 5 96*   < > 5 94*   < > 5 53*   CALCIUM mg/dL 8 5 9 3 9 3   < > 9 3   < > 9 1   ALK PHOS U/L  --   --   --   --  48  --  50   ALT U/L  --   -- --   --  163*  --  37   AST U/L  --   --   --   --  148*  --  37    < > = values in this interval not displayed  Phosphorus:   Lab Results   Component Value Date    PHOS 6 6 (H) 01/24/2022     Magnesium:   Lab Results   Component Value Date    MG 2 5 01/24/2022     Urinalysis: No results found for: Ival Copier, SPECGRAV, PHUR, LEUKOCYTESUR, NITRITE, PROTEINUA, GLUCOSEU, KETONESU, BILIRUBINUR, BLOODU  Ionized Calcium: No results found for: CAION  Coagulation: No results found for: PT, INR, APTT  Troponin: No results found for: TROPONINI  ABG: No results found for: PHART, XJA8SWY, PO2ART, XEY7HYO, I7JOZJOX, BEART, SOURCE  Radiology review:     IMAGING  Procedure: XR chest portable    Result Date: 1/23/2022  Narrative: CHEST INDICATION:   rule out pulm edema  COMPARISON:  Chest radiograph from 1/21/2022 and chest CT from 11/24/2021  EXAM PERFORMED/VIEWS:  XR CHEST PORTABLE FINDINGS:  Right port at cavoatrial junction  Mild cardiomegaly with left subclavian pacemaker leads in the right atrium and right ventricle  Moderate pulmonary edema  Trace effusions  No pneumothorax  Osseous structures appear within normal limits for patient age  Impression: Moderate pulmonary edema with trace effusions  Workstation performed: [id number]     Procedure: XR chest 1 view portable    Result Date: 1/21/2022  Narrative: CHEST INDICATION:   sob  COMPARISON:  11/20/2021 EXAM PERFORMED/VIEWS:  XR CHEST PORTABLE Single view FINDINGS: Persistent or recurrent vascular congestion Persistent cardiomegaly  Typical dual-lead left-sided cardiac pacer is unchanged  Interval placement of right IJ port terminating at cavoatrial junction No pneumothorax or pleural effusion  Osseous structures appear within normal limits for patient age       Impression: Persistent or recurrent pulmonary vascular congestion Persistent cardiomegaly Workstation performed: [id number]     Procedure: XR chest portable ICU    Result Date: 1/23/2022  Narrative: CHEST INDICATION:   line placement  COMPARISON:  Chest radiograph from 1/22/2022 and chest CT from 11/24/2021  EXAM PERFORMED/VIEWS:  XR CHEST PORTABLE ICU FINDINGS:  Right jugular catheter in upper SVC  Right port at cavoatrial junction  Mild cardiomegaly with left subclavian pacemaker leads in the right atrium and right ventricle  No pneumothorax  Persistent pulmonary edema with small effusions  Suture anchor in left humeral head  Impression: Right jugular catheter in upper SVC with no pneumothorax  Persistent pulmonary edema with small effusions  Workstation performed: [id number]     Procedure: US kidney and bladder with pvr    Result Date: 1/24/2022  Narrative: RENAL ULTRASOUND INDICATION:   severe josué  COMPARISON: Renal ultrasound dated 3/23/2020  TECHNIQUE:   Ultrasound of the retroperitoneum was performed with a curvilinear transducer utilizing volumetric sweeps and still imaging techniques  FINDINGS: KIDNEYS: Symmetric and within normal limits of size  Right kidney:  11 7 cm  Left kidney:  11 7 cm  Right kidney Normal echogenicity and contour  No suspicious masses detected  1 3 x 1 4 x 1 2 cm simple cyst in the interpolar region  No hydronephrosis  No shadowing calculi  No perinephric fluid collections  Left kidney Normal echogenicity and contour  No suspicious masses detected  No hydronephrosis  No shadowing calculi  No perinephric fluid collections  URETERS: Nonvisualized  BLADDER: Urinary bladder volume is 95 cc  No focal thickening or mass lesions  Bilateral ureteral jets not detected  Small pelvic free fluid  Impression: No hydronephrosis  Urinary bladder volume is 95 cc  Unable to measure post void residual as the patient voided prior to the examination via self-catheterization  Small pelvic free fluid  Workstation performed: [id number]     Procedure: US bedside procedure    Result Date: 1/22/2022  Narrative: 1 2 [phone] [id number]  3 [phone] [id number]  1 1      Current Facility-Administered Medications   Medication Dose Route Frequency    amLODIPine (NORVASC) tablet 10 mg  10 mg Oral Daily    apixaban (ELIQUIS) tablet 5 mg  5 mg Oral Q12H    calcium acetate (PHOSLO) capsule 667 mg  667 mg Oral Daily With Dinner    famotidine (PEPCID) tablet 20 mg  20 mg Oral Daily    fish oil capsule 1,000 mg  1,000 mg Oral BID    insulin lispro (HumaLOG) 100 units/mL subcutaneous injection 1-6 Units  1-6 Units Subcutaneous TID AC    insulin lispro (HumaLOG) 100 units/mL subcutaneous injection 1-6 Units  1-6 Units Subcutaneous HS    metoprolol tartrate (LOPRESSOR) tablet 50 mg  50 mg Oral Q12H    ondansetron (ZOFRAN) injection 4 mg  4 mg Intravenous Q4H PRN    prochlorperazine (COMPAZINE) tablet 5 mg  5 mg Oral Q6H PRN    tamsulosin (FLOMAX) capsule 0 4 mg  0 4 mg Oral Daily With Dinner     Medications Discontinued During This Encounter   Medication Reason    famotidine (PEPCID) tablet 40 mg Dose adjustment    ondansetron (ZOFRAN) injection 4 mg     bumetanide (BUMEX) injection 2 mg     metoclopramide (REGLAN) injection 10 mg     amLODIPine (NORVASC) tablet 10 mg     sodium bicarbonate tablet 1,300 mg     prochlorperazine (COMPAZINE) tablet 5 mg        Yvonne Osborne MD      This progress note was produced in part using a dictation device which may document imprecise wording from author's original intent

## 2022-01-24 NOTE — ASSESSMENT & PLAN NOTE
Wt Readings from Last 3 Encounters:   01/24/22 93 7 kg (206 lb 9 1 oz)   01/18/22 91 7 kg (202 lb 2 6 oz)   01/18/22 91 6 kg (201 lb 14 4 oz)         -symptoms currently improved  -continue current medical therapy  -continue monitor patient clinically at this time

## 2022-01-24 NOTE — ASSESSMENT & PLAN NOTE
-appears on telemetry to be currently paced with heart rate 60s with underlying atrial flutter  -continue current medical therapy with oral anticoagulation with Eliquis 5 mg twice daily and continue metoprolol tartrate 50 mg twice daily  -could consider interrogation of patient's device if rate or rhythm becomes significantly worse

## 2022-01-24 NOTE — PHYSICAL THERAPY NOTE
Physical Therapy Evaluation     Patient's Name: Shane Welch    Admitting Diagnosis  Dyspnea [R06 00]  SOB (shortness of breath) [R06 02]  ELZBIETA (acute kidney injury) ([facility] ) [N17 9]  Acute on chronic diastolic congestive heart failure ([facility] ) [I50 33]  Port-A-Cath in place [Z95 828]    Problem List  Patient Active Problem List   Diagnosis    Bilateral leg edema    Diabetic ulcer of left foot ([facility] )    Easy bruising    Eczema    Erectile dysfunction of non-organic origin    Gout    Hyperlipidemia    Uremic acidosis    Arthritis    Peripheral arterial disease ([facility]    PVCs (premature ventricular contractions)    Rhinitis    Systolic murmur    Vertigo    Complete heart block ([facility]    Metabolic acidosis    Acute kidney injury ([facility] )    Other hyperlipidemia    PAF (paroxysmal atrial fibrillation) ([facility])    Persistent proteinuria    Volume overload    Urinary retention    NICOLASA (obstructive sleep apnea)    Type 2 diabetes mellitus with chronic kidney disease, with long-term current use of insulin ([facility])    Hypertension    Stage 4 chronic kidney disease ([facility])    Nonrheumatic aortic valve stenosis    Anemia    Fever    Mitral valve stenosis    Abnormal CT of the chest    Hyperkalemia    Acute pulmonary edema ([facility]    Chronic diastolic (congestive) heart failure ([facility])    Left renal artery stenosis (HCC)    S/P amputation of lesser toe, left (HCC)    S/P amputation of lesser toe, right (HCC)    Elevated troponin I level    Staphylococcus aureus bacteremia    Type 2 diabetes mellitus with diabetic neuropathy, without long-term current use of insulin ([facility])    Hyponatremia    Iron deficiency anemia    Anxiety    [facility]    Amputation of left great toe ([facility]    Multiple drug resistant organism (MDRO) culture positive    Renovascular hypertension    SSS (sick sinus syndrome) (HCC)    Chronic obstructive pulmonary disease (HCC)    Absence of toe ([facility]    Chronic painful diabetic neuropathy (HCC)    Onychomycosis    Colon cancer ([facility] )    Acute kidney injury superimposed on chronic kidney disease ([place] [address] )    RSV (respiratory syncytial virus pneumonia)    Chemotherapy-induced neutropenia (HCC)    Nausea    Acute on chronic diastolic congestive heart failure (HCC)       Past Medical History  Past Medical History:   Diagnosis Date    Anemia     iron def    Arthritis     OA    Bruise of both arms     forearms and both hands    Bruises easily     Cancer ([place] [address] ) 09/01/2021    colon    CH[facility]congestive heart failure) [facility]     Chronic kidney disease     CPAP (continuous positive airway pressure) dependence     Diabetes mellitus ([place] [address] )     Diabetic foot ulcer ([place] [address] )     Eczema     Erectile dysfunction     Gout     Heart murmur     History of permanent cardiac pacemaker placement 11/2018    Hyperlipidemia     Hypertension     Hypoglycemia 3/8/2019    Murmur     Nephropathy     Osteoarthritis     Pacemaker 11/06/2018    PAD (peripheral artery disease) ([facility])     Seasonal allergies     Sleep apnea     Toe infection     bilat great toes    Vertigo     Walks frequently     Wears dentures     upper    Wears glasses        Past Surgical History  Past Surgical History:   Procedure Laterality Date    CARDIAC PACEMAKER PLACEMENT      CARPAL TUNNEL RELEASE Left     CARPAL TUNNEL RELEASE Right     CARPAL TUNNEL RELEASE      COLONOSCOPY  08/2020    COLONOSCOPY      FL GUIDED CENTRAL VENOUS ACCESS DEVICE INSERTION  1/11/2022    FOOT AMPUTATION Left 2/10/2020    Procedure: PARTIAL 1ST RAY AMPUTATION;  Surgeon: Ida Ortiz DPM;  [facility];  Service: Podiatry    INCISION AND DRAINAGE OF WOUND Left 1/12/2020    Procedure: INCISION AND DRAINAGE (I&D) EXTREMITY AND REMOVAL OF SESMOID BONE;  Surgeon: Ofe Marrero DPM;  [facility];  Service: Podiatry    IR PICC REPOSITION  1/14/2020    KNEE ARTHROSCOPY Left     KNEE ARTHROSCOPY Right     KNEE SURGERY      NC AMPUTATION TOE,I-P JT Bilateral 5/2/2017    Procedure: PARTIAL AMPUTATION RIGHT AND LEFT HALLUX ;  Surgeon: Richard Baeza DPM;  [facility];  Service: Podiatry    NC AMPUTATION TOE,MT-P JT Left 7/25/2017    Procedure: 2ND TOE AMPUTATION;  Surgeon: Richard Baeza DPM;  [facility];  Service: Podiatry    NC INSJ TUNNELED CTR VAD W/SUBQ PORT AGE 5 YR/> N/A 1/11/2022    Procedure: INSERTION VENOUS PORT ( PORT-A-CATH) IR;  Surgeon: Kaylee Cordova DO;  [facility];  Service: Interventional Radiology    RESECTION LOW ANTERIOR LAPAROSCOPIC N/A 10/21/2021    Procedure: RESECTION LOW ANTERIOR LAPAROSCOPIC;  Surgeon: Reuben Fowler MD;  [facility];  Service: Colorectal    SHOULDER ARTHROSCOPY Left     with screws,RTC    SHOULDER SURGERY      TOE AMPUTATION Left 1/8/2020    Procedure: HALLUX AMPUTATION;  Surgeon: Noe Nagy DPM;  [facility];  Service: Podiatry    UPPER GASTROINTESTINAL ENDOSCOPY  03/2020    Intestinal metaplasia prepyloric    VASECTOMY          01/24/22 0825   PT Last Visit   PT Visit Date 01/24/22   Note Type   Note type Evaluation   Pain Assessment   Pain Assessment Tool 0-10   Pain Score No Pain  (denies)   Restrictions/Precautions   Weight Bearing Precautions Per Order No   Braces or Orthoses Other (Comment)  (L surgical shoe,donned for mobility)   Other Precautions Contact/isolation;Multiple lines;Telemetry;O2;Fall Risk  (decreased skin integrity-L foot)   Home Living   Type of [address] [place] [address] Two level;Stairs to enter without rails  (1 LAURA, full flight to 2nd)   Bathroom Shower/Tub Tub/shower unit   [employer] Raised   Bathroom Equipment Grab bars in shower; Shower chair   Bathroom Accessibility Accessible   Home Equipment Walker;Cane  (did not utilize prior to arrival)   Prior Function   Level of [place] Independent with ADLs and functional mobility   Lives With Spouse; Family   Receives Help From [facility] ADL Assistance Independent   IADLs Needs assistance  (shared responsibilities-cooking, laundry)   Falls in the last 6 months 0  (denies)   Vocational Retired   General   Additional Pertinent History Jeanette OT present for co-assessment due to medical complexity, required skilled interventions of 2 skilled clinicians  Family/Caregiver Present No   Cognition   Overall Cognitive Status WFL   Arousal/Participation Alert   Orientation Level Oriented X4   Memory Within functional limits   Following Commands Follows all commands and directions without difficulty   Comments Pt  agreeable to PT assessment, pleasant  RLE Assessment   RLE Assessment X  (3+/5 gross musculature)   LLE Assessment   LLE Assessment X  (3+/5 gross musculature)   Vision-Basic Assessment   Current Vision Wears glasses all the time   Vestibular   Spontaneous Nystagmus (-) no evidence of nystagmus at rest in room light   Coordination   Movements are Fluid and Coordinated 0   Coordination and Movement Description Incremental mobility requiring increased time & compensatory strategies  Bed Mobility   Supine to Sit   (CGA)   Additional items Assist x 1;HOB elevated; Bedrails; Increased time required;Verbal cues   Sit to Supine   (DNT pt  was sitting out of bed on recliner upon conclusion )   Additional Comments Pt  denied lightheadedness/dizziness with positional changes  However, increased nausea noted as session progressed  Transfers   Sit to Stand   (CGA)   Additional items Assist x 1;Bedrails; Increased time required;Verbal cues   Stand to Sit   (CGA)   Additional items Assist x 1;Bedrails; Increased time required;Verbal cues   Stand pivot   (CGA)   Additional items Assist x 1; Increased time required;Verbal cues   Ambulation/Elevation   Gait pattern Improper Weight shift; Forward Flexion;Decreased foot clearance; Short stride   Gait Assistance 4  Minimal assist   Additional items Assist x 1;Verbal cues; Tactile cues   Assistive Device Rolling walker Distance 5 feet  (to recliner,could not safely advance due to medical)   Stair Management Assistance Not tested   Balance   Static Sitting Good   Dynamic Sitting Fair +   Static Standing Fair   Dynamic Standing Fair -   Ambulatory Fair -   Endurance Deficit   Endurance Deficit Yes   Endurance Deficit Description nausea, easily fatigued w/functional tasks   Activity Tolerance   Activity Tolerance Patient limited by fatigue;Treatment limited secondary to medical complications (nausea)   Nurse Made Aware yes, nursing staff was informed of assessment outcome  Assessment   Prognosis Good   Problem List Decreased strength;Decreased endurance; Impaired balance;Decreased mobility; Decreased coordination;Obesity   Assessment Pt is 74 y o  male seen for PT evaluation s/p admit to [facility] on 1/21/2022 w/ Acute on chronic diastolic congestive heart failure ([facility] )  PT consulted to assess pt's functional mobility and d/c needs  Order placed for PT eval and tx, w/ up w/ A order  Comorbidities affecting pt's physical performance at time of assessment include: weakness,acute on chronic CHF, colon CA with active treatment, diabetic L foot ulcer,hyponatremia, HTN, PAD, type 2 DM  PTA, pt was independent w/ all functional mobility w/ o AD usage  Personal factors affecting pt at time of IE include: stairs to enter home, inability to ambulate household distances, inability to navigate community distances, inability to navigate level surfaces w/o external assistance, unable to perform dynamic tasks in community, unable to perform physical activity, limited insight into impairments, inability to perform IADLs and inability to perform ADLs   Please find objective findings from PT assessment regarding body systems outlined above with impairments and limitations including weakness, impaired balance, decreased endurance, impaired coordination, gait deviations, decreased activity tolerance, decreased functional mobility tolerance, fall risk and decreased skin integrity  From PT/mobility standpoint, recommendation at time of d/c would be home with home health rehabilitation pending progress in order to facilitate return to PLOF  Goals   Patient Goals to feel better soon   LTG Expiration Date 02/03/22   Long Term Goal #1 1 )Patient will complete bed mobility supervision of 1 for decrease need for caregiver assistance, decrease burden of care  2 ) Patient will complete transfers supervision of 1 to decrease risk of falls, facilitate upright standing posture  3 ) BLE strength to greater than/equal to 4/5 gross musculature to increase ability to safely transfer, control descent to chair  4 ) Patient will exhibit increase dynamic standing to Good 3-4 minutes  without LOB supervision of 1 to improve activity tolerance  5 ) Patient will exhibit increase dynamic ambulatory balance to Fair   feet w/AD prn supervision of 1 to improve ability to mobilize to toilet, chair and decrease risk for additional medical complications  6 ) Patient will exhibit good self monitoring and ability to follow 2 step commands to increase complexity of tasks and resume ADL's without LOB  PT Treatment Day 0   Plan   Treatment/Interventions Functional transfer training;LE strengthening/ROM; Therapeutic exercise; Endurance training;Patient/family training;Equipment eval/education; Bed mobility;Gait training;Spoke to nursing   PT Frequency 3-5x/wk   Recommendation   PT Discharge Recommendation Home with home health rehabilitation   Equipment Recommended [facility] Recommended Wheeled walker   Change/add to [employer]? No   Additional Comments Upon conclusion, pt  was resting out of bed on recliner w/B SCDs active, and chair alarm engaged  Additional Comments 2 Pt's raw score on the AM-PAC Basic Mobility inpatient short form is 18, standardized score is 41 05  Patients at this level are likely to benefit from DC to home   However, please refer to therapist recommendation for safe DC planning     AM-PAC Basic Mobility Inpatient   Turning in Bed Without Bedrails 4   Lying on Back to Sitting on Edge of Flat Bed 3   Moving Bed to Chair 3   Standing Up From Chair 3   Walk in Room 3   Climb 3-5 Stairs 2   Basic Mobility Inpatient Raw Score 18   Basic Mobility Standardized Score 41 05   Highest Level Of Mobility   [facility] Goal 6: Walk 10 steps or more     History/Personal Factors/Comorbidities: weakness,acute on chronic CHF, colon CA with active treatment, diabetic L foot ulcer,hyponatremia, HTN, PAD, type 2 DM    # of body structures/limitations: muscle weakness, activity intolerance,decreased endurance, impaired balance, gait deviations,impaired coordination    Clinical presentation: unstable as seen in nausea, progressive symptoms prior to hospitalization, fatigue severity with minimal mobility, ongoing cancer treatment, fall risk    Initial Assessment Time: [id number]    Taurus Mccoy, PT

## 2022-01-24 NOTE — PLAN OF CARE
Problem: OCCUPATIONAL THERAPY ADULT  Goal: Performs self-care activities at highest level of function for planned discharge setting  See evaluation for individualized goals  Description: Treatment Interventions: ADL retraining,Functional transfer training,UE strengthening/ROM,Endurance training,Patient/family training,Compensatory technique education,Activityengagement,Energy conservation          See flowsheet documentation for full assessment, interventions and recommendations  Note: Limitation: Decreased ADL status,Decreased UE strength,Decreased endurance,Decreased self-care trans,Decreased high-level ADLs  Prognosis: Good  Assessment: Patient is a 74 y o  male seen for OT evaluation s/p admit to [place] [address] on 1/21/2022 w/Acute on chronic diastolic congestive heart failure ([facility] )  Commorbidities affecting patient's functional performance at time of assessment include: ELZBIETA, colon cancer, diabetic ulcer of L foot, HTN, hyperkalemia, NICOLASA, and SSS  Orders placed for OT evaluation and treatment and up with assistance  Performed at least two patient identifiers during session including name and wristband  Prior to admission, Patient reporting being independent with ADLs/IADLs, ambulatory with no AD and lives with family in a two story house  Personal factors affecting patient at time of initial evaluation include: steps to enter, difficulty performing ADLs and difficulty performing IADLs  Upon evaluation, patient requires supervision assist for UB ADLs, minimal  assist for LB ADLs, transfers and functional ambulation in room and bathroom with contact guard assist, with the use of no AD  Patient is oriented x 4 and presents with ability to recognize a problem, define a problem, identify alternative plan, select a plan, organize steps in a plan, implement plan and evaluate outcome (problem solving)    Occupational performance is affected by the following deficits: decreased muscle strength, dynamic sit/ stand balance deficit with poor standing tolerance time for self care and functional mobility, decreased activity tolerance and delayed righting and equilibrium reactions  Based on the mentioned OT evaluation outcomes, functional performance deficits, and assessment findings, pt has been identified as a moderate complexity evaluation  Patient to benefit from continued Occupational Therapy treatment while in the hospital to address deficits as defined above and maximize level of functional independence with ADLs and functional mobility  Occupational Performance areas to address include: eating, grooming , bathing/ shower, dressing, toilet hygiene, transfer to all surfaces, functional ambulation, medication routine/ management, IADLS: Household maintenance, IADLs: safety procedures and IADLs: meal prep/ clean up  From OT standpoint, recommendation at time of d/c would be Home with home health rehabilitation         OT Discharge Recommendation: Home with home health rehabilitation  OT - OK to Discharge: Yes (Once medically cleared )     Teresa Morales OT

## 2022-01-24 NOTE — ASSESSMENT & PLAN NOTE
Lab Results   Component Value Date    EGFR 11 01/24/2022    EGFR 8 01/23/2022    EGFR 8 01/22/2022    CREATININE 4 87 (H) 01/24/2022    CREATININE 6 14 (H) 01/23/2022    CREATININE 5 96 (H) 01/22/2022     · History of CKD, recently started chemotherapy for colon cancer with significant worsening of kidney function with hospitalization for creatinine around 0 7   Baseline creatinine level is around 3  · ER PA consulted nephrology who recommended Lasix 80 mg and nephrology consultation  · I&O, daily weights  · Urinary retention protocol  · Nephrology recommendations appreciated-dialysis was initiated due to volume overload and electrolyte abnormalities

## 2022-01-24 NOTE — PROGRESS NOTES
[address]  Progress Note Agustin Carpenter 1955, 74 y o  male MRN: [id number]  Unit/Bed#: MS Dima-Ame Encounter: [id number]  Primary Care Provider: Farhat Claros DO   Date and time admitted to hospital: 1/21/2022  1:36 PM    * Acute on chronic diastolic congestive heart failure ([facility] )  Assessment & Plan  · Presented for shortness of breath while receiving fluids at infusion center due to nausea from chemotherapy  · He is on 4 L of oxygen chronically  · He was placed on CPAP by EMS prior to arrival for increased work of breathing  · EKG:  Paced rhythm  · BNP: 1067  · Chest x-ray: Persistent or recurrent pulmonary vascular congestion persistent cardiomegaly   · He received 40 mg of IV Lasix followed by Flomax 2 mg IV x 2-ultimately required initiation of dialysis  · Daily weights and I&Os  · Cardiology recommendations appreciated    Acute kidney injury superimposed on chronic kidney disease [facility])  Assessment & Plan  Lab Results   Component Value Date    EGFR 11 01/24/2022    EGFR 8 01/23/2022    EGFR 8 01/22/2022    CREATININE 4 87 (H) 01/24/2022    CREATININE 6 14 (H) 01/23/2022    CREATININE 5 96 (H) 01/22/2022     · History of CKD, recently started chemotherapy for colon cancer with significant worsening of kidney function with hospitalization for creatinine around 0 7   Baseline creatinine level is around 3  · ER PA consulted nephrology who recommended Lasix 80 mg and nephrology consultation  · I&O, daily weights  · Urinary retention protocol  · Nephrology recommendations appreciated-dialysis was initiated due to volume overload and electrolyte abnormalities    Colon cancer [facility])  Assessment & Plan  · Adenocarcinoma 01/08/2021 currently being treated with chemotherapy at infusion center  · Continue outpatient follow-up after resolution of acute issue    SSS (sick sinus syndrome) ([facility] )  Assessment & Plan  · s/p Medtronic dual-chamber permanent pacemaker  · Continue to monitor      Hyponatremia  Assessment & Plan  · Sodium 127>> 132 after dialysis  · Recheck metabolic panel and trend potassium    Hyperkalemia  Assessment & Plan  · Potasium 6 0 >>4 8>> 3 5 after dialysis  · Recheck metabolic panel and trend potassium      Uremic acidosis  Assessment & Plan  · AG 22, CO2 14, -improved after dialysis  · Nephrology input appreciated  · Serial labs    Diabetic ulcer of left foot [facility])  Assessment & Plan  Lab Results   Component Value Date    HGBA1C 5 7 (H) 2021       Recent Labs     22  1041 22  1615 22  2203 22  0700   POCGLU 242* 190* 251* 158*       Blood Sugar Average: Last 72 hrs:  (P) 964 [id number]     · Follows up with [address] for 20 years-with skin graft for diabetic foot ulcer  · Wound care consult    VTE Pharmacologic Prophylaxis:   Pharmacologic: Heparin  Mechanical VTE Prophylaxis in Place: Yes    Patient Centered Rounds: I have performed bedside rounds with nursing staff today  Education and Discussions with Family / Patient:  Wife at bedside    Time Spent for Care: 30 minutes  More than 50% of total time spent on counseling and coordination of care as described above  Current Length of Stay: 3 day(s)    Current Patient Status: Inpatient   Certification Statement: The patient will continue to require additional inpatient hospital stay due to per plan above    Discharge Plan: To be determined    Code Status: Level 3 - DNAR and DNI      Subjective:   Patient seen and examined  He says he feels much better  No complaints offered  Objective:     Vitals:   Temp (24hrs), Av 8 °F (36 6 °C), Min:97 3 °F (36 3 °C), Max:98 3 °F (36 8 °C)    Temp:  [97 3 °F (36 3 °C)-98 3 °F (36 8 °C)] 98 2 °F (36 8 °C)  HR:  [52-68] 60  Resp:  [16-19] 19  BP: (123-158)/(49-89) 158/70  SpO2:  [88 %-96 %] 96 %  Body mass index is 29 64 kg/m²  Input and Output Summary (last 24 hours):        Intake/Output Summary (Last 24 hours) at 1/24/2022 1550  Last data filed at 1/24/2022 1300  Gross per 24 hour   Intake 800 ml   Output 3609 ml   Net -2809 ml       Physical Exam:     Physical Exam  Vitals and nursing note reviewed  HENT:      Head: Normocephalic and atraumatic  Mouth/Throat:      Mouth: Mucous membranes are moist       Pharynx: Oropharynx is clear  Eyes:      Extraocular Movements: Extraocular movements intact  Pupils: Pupils are equal, round, and reactive to light  Cardiovascular:      Rate and Rhythm: Normal rate and regular rhythm  Pulses: Normal pulses  Heart sounds: Murmur heard  Pulmonary:      Effort: Pulmonary effort is normal       Breath sounds: Decreased breath sounds present  Abdominal:      General: Bowel sounds are normal       Palpations: Abdomen is soft  Tenderness: There is no abdominal tenderness  Musculoskeletal:         General: Normal range of motion  Cervical back: Normal range of motion and neck supple  Skin:     General: Skin is warm and dry  Capillary Refill: Capillary refill takes less than 2 seconds  Comments: Right foot dressing clean dry and intact  Neurological:      General: No focal deficit present  Mental Status: He is alert and oriented to person, place, and time  Additional Data:     Labs:    Results from last 7 days   Lab Units 01/24/22  0506 01/23/22  0529 01/22/22  0646 01/21/22  1359 01/21/22  1359   WBC Thousand/uL 8 34   < > 12 45*   < > 13 32*   HEMOGLOBIN g/dL 9 6*   < > 12 0   < > 12 2   HEMATOCRIT % 27 8*   < > 34 8*   < > 37 2   PLATELETS Thousands/uL 179   < > 266   < > 320   NEUTROS PCT %  --   --   --   --  88*   LYMPHS PCT %  --   --   --   --  7*   LYMPHO PCT %  --   --  14  --   --    MONOS PCT %  --   --   --   --  4   MONO PCT %  --   --  2*  --   --    EOS PCT %  --   --  0  --  0    < > = values in this interval not displayed       Results from last 7 days   Lab Units 01/24/22  0506 01/22/22  1441 01/22/22  0646   SODIUM mmol/L 132*   < > 127*   POTASSIUM mmol/L 3 5   < > 5 6*   CHLORIDE mmol/L 92*   < > 95*   CO2 mmol/L 26   < > 17*   BUN mg/dL 93*   < > 112*   CREATININE mg/dL 4 87*   < > 5 94*   ANION GAP mmol/L 14*   < > 15*   CALCIUM mg/dL 8 5   < > 9 3   ALBUMIN g/dL  --   --  3 9   TOTAL BILIRUBIN mg/dL  --   --  1 10*   ALK PHOS U/L  --   --  48   ALT U/L  --   --  163*   AST U/L  --   --  148*   GLUCOSE RANDOM mg/dL 152*   < > 166*    < > = values in this interval not displayed  Results from last 7 days   Lab Units 01/21/22  1359   INR  1 77*     Results from last 7 days   Lab Units 01/24/22  0700 01/23/22  2203 01/23/22  1615 01/23/22  1041 01/23/22  0701 01/22/22  2343 01/22/22  1653 01/22/22  1101 01/22/22  0652 01/21/22  2229 01/21/22  1826 01/21/22  1547   POC GLUCOSE mg/dl 158* 251* 190* 242* 169* 151* 167* 144* 173* 222* 177* 196*         Results from last 7 days   Lab Units 01/22/22  1441   LACTIC ACID mmol/L 1 2           * I Have Reviewed All Lab Data Listed Above  * Additional Pertinent Lab Tests Reviewed:  Jess [facility] Admission Reviewed            Last 24 Hours Medication List:   Current Facility-Administered Medications   Medication Dose Route Frequency Provider Last Rate    amLODIPine  10 mg Oral Daily PAT Wild      apixaban  5 mg Oral Q12H PAT Torres      calcium acetate  667 mg Oral Daily With [employer], PAT      famotidine  20 mg Oral Daily PAT Torres      fish oil  1,000 mg Oral BID PAT Torres      insulin lispro  1-6 Units Subcutaneous TID AC PAT Torres      insulin lispro  1-6 Units Subcutaneous HS PAT Torres      metoprolol tartrate  50 mg Oral Q12H PAT Torres      ondansetron  4 mg Intravenous Q4H PRN PAT Lobato      prochlorperazine  5 mg Oral Q6H PRN Presley Richard PA-C      tamsulosin  0 4 mg Oral Daily With [facility], Patient Was Seen By: APT Rosales    ** Please Note: Dictation voice to text software may have been used in the creation of this document   **

## 2022-01-24 NOTE — NURSING NOTE
Pt wanting cpap mask off stating it was not that comfortable  Pt placed back on 2l nasal cannula SPO2 94%  Respiratory made aware  132

## 2022-01-24 NOTE — NURSING NOTE
Pt voided 275mls in urinal  Post void residual done reading over 600mls  Hospitlaist made aware, Urinary retention protocol ordered  Pt stating he does not feel like he can pee anymore at this time  Straight cath indicated  Will continue to monitor

## 2022-01-24 NOTE — OCCUPATIONAL THERAPY NOTE
Occupational Therapy Evaluation      Tasneem Adair    1/24/2022    Patient Active Problem List   Diagnosis    Bilateral leg edema    Diabetic ulcer of left foot ([facility]    Easy bruising    Eczema    Erectile dysfunction of non-organic origin    Gout    Hyperlipidemia    Uremic acidosis    Arthritis    Peripheral arterial disease (HCC)    PVCs (premature ventricular contractions)    Rhinitis    Systolic murmur    Vertigo    Complete heart block (HCC)    Metabolic acidosis    [facility]    Other hyperlipidemia    PAF (paroxysmal atrial fibrillation) ([facility]    Persistent proteinuria    Volume overload    Urinary retention    NICOLASA (obstructive sleep apnea)    Type 2 diabetes mellitus with chronic kidney disease, with long-term current use of insulin ([facility]    Hypertension    Stage 4 chronic kidney disease ([facility]    Nonrheumatic aortic valve stenosis    Anemia    Fever    Mitral valve stenosis    Abnormal CT of the chest    Hyperkalemia    Acute pulmonary edema ([facility]    Chronic diastolic (congestive) heart failure ([facility]    Left renal artery stenosis ([facility]    S/P amputation of lesser toe, left (HCC)    S/P amputation of lesser toe, right (HCC)    Elevated troponin I level    Staphylococcus aureus bacteremia    Type 2 diabetes mellitus with diabetic neuropathy, without long-term current use of insulin ([facility]    Hyponatremia    Iron deficiency anemia    Anxiety    [facility]omyelitis [facility]    Amputation of left great toe ([facility]    Multiple drug resistant organism (MDRO) culture positive    Renovascular hypertension    SSS (sick sinus syndrome) ([facility]    Chronic obstructive pulmonary disease (HCC)    Absence of toe (HCC)    Chronic painful diabetic neuropathy ([facility]    Onychomycosis    Colon cancer ([facility]    Acute kidney injury superimposed on chronic kidney disease ([facility] )    RSV (respiratory syncytial virus pneumonia)    Chemotherapy-induced neutropenia ([facility]  Nausea    Acute on chronic diastolic congestive heart failure ([facility])       Past Medical History:   Diagnosis Date    Anemia     iron def    Arthritis     OA    Bruise of both arms     forearms and both hands    Bruises easily     Cancer [facility] ) 09/01/2021    colon    CHF (congestive heart failure) ([facility])     Chronic kidney disease     CPAP (continuous positive airway pressure) dependence     Diabetes mellitus ([facility] )     [facility]     Eczema     Erectile dysfunction     Gout     Heart murmur     History of permanent cardiac pacemaker placement 11/2018    Hyperlipidemia     Hypertension     Hypoglycemia 3/8/2019    Murmur     Nephropathy     Osteoarthritis     Pacemaker 11/06/2018    PAD (peripheral artery disease) ([facility])     Seasonal allergies     Sleep apnea     Toe infection     bilat great toes    Vertigo     Walks frequently     Wears dentures     upper    Wears glasses        Past Surgical History:   Procedure Laterality Date    CARDIAC PACEMAKER PLACEMENT      CARPAL TUNNEL RELEASE Left     CARPAL TUNNEL RELEASE Right     CARPAL TUNNEL RELEASE      COLONOSCOPY  08/2020    COLONOSCOPY      FL GUIDED CENTRAL VENOUS ACCESS DEVICE INSERTION  1/11/2022    FOOT AMPUTATION Left 2/10/2020    Procedure: PARTIAL 1ST RAY AMPUTATION;  Surgeon: Charu Mercer DPM;  [facility];  Service: Podiatry    INCISION AND DRAINAGE OF WOUND Left 1/12/2020    Procedure: INCISION AND DRAINAGE (I&D) EXTREMITY AND REMOVAL OF SESMOID BONE;  Surgeon: Bharat Hawkins DPM;  [facility];  Service: Podiatry    IR PICC REPOSITION  1/14/2020    KNEE ARTHROSCOPY Left     KNEE ARTHROSCOPY Right     KNEE SURGERY      NV AMPUTATION TOE,I-P JT Bilateral 5/2/2017    Procedure: PARTIAL AMPUTATION RIGHT AND LEFT HALLUX ;  Surgeon: Charu Mercer DPM;  [facility];  Service: Podiatry    NV AMPUTATION TOE,MT-P JT Left 7/25/2017    Procedure: 2ND TOE AMPUTATION;  Surgeon: Dayna Bunch LIZBETH Carrillo;  [facility];  Service: Podiatry    OH INSJ TUNNELED CTR VAD W/SUBQ PORT AGE 5 YR/> N/A 1/11/2022    Procedure: INSERTION VENOUS PORT ( PORT-A-CATH) IR;  Surgeon: Lobo Lambert DO;  [facility];  Service: Interventional Radiology    RESECTION LOW ANTERIOR LAPAROSCOPIC N/A 10/21/2021    Procedure: RESECTION LOW ANTERIOR LAPAROSCOPIC;  Surgeon: Reshma House MD;  [facility];  Service: Colorectal    SHOULDER ARTHROSCOPY Left     with screws,RTC    SHOULDER SURGERY      TOE AMPUTATION Left 1/8/2020    Procedure: Junious Medicus;  Surgeon: Stone Guillory DPM;  [facility];  Service: Podiatry    UPPER GASTROINTESTINAL ENDOSCOPY  03/2020    Intestinal metaplasia prepyloric    VASECTOMY          01/24/22 0836   OT Last Visit   OT Visit Date 01/24/22   Note Type   Note type Evaluation   Additional Comments Pt agreeable to OT eval  Upon arrival pt supine in bed with HOB elevated  Restrictions/Precautions   Weight Bearing Precautions Per Order No   Braces or Orthoses Other (Comment)  (L surgical shoe donned prioer to OOB mobility )   Other Precautions Contact/isolation;Multiple lines;Telemetry;O2;Fall Risk   Pain Assessment   Pain Assessment Tool 0-10   Pain Score No Pain   Home Living   Type of Home House   Home Layout Two level;Bed/bath upstairs;Stairs to enter with rails  (1 [address]URA, FOS to 2nd floor )   Bathroom Shower/Tub Tub/shower unit   [employer] Raised   Bathroom Equipment Grab bars in shower; Shower chair   Bathroom Accessibility Accessible   Home Equipment Walker;Cane  (no AD used at baseline )   Prior Function   Level of [place] Independent with ADLs and functional mobility   Lives With Spouse; Family  ("wife and 5 children")   Receives Help From Family   ADL Assistance Independent   IADLs Independent  (shared responsibilities-cooking, laundry)   Falls in the last 6 months 0   Vocational Retired   Lifestyle   Autonomy Patient reporting being independent with ADLs/IADLs, ambulatory with no AD and lives with family in a two story house   Reciprocal Relationships wife    Service to Others retired    ADL   Eating Assistance 6  Modified independent   [facility] 6  [address]  [address] [facility] 4  [address]  [address] [facility] 4  [address]  5  Supervision/Setup   Bed Mobility   Supine to Sit 5  Supervision   Additional items Assist x 1;HOB elevated; Bedrails; Increased time required;Verbal cues   Sit to Supine   (DNT: pt seated OOB in recliner at end of eval)   Additional Comments Pt  denied lightheadedness/dizziness with positional changes  However, increased nausea upon sitting at EOB and donning L surgical shoe   Transfers   Sit to Stand   (CGA)   Additional items Assist x 1; Increased time required;Verbal cues   Stand to Sit   (CGA)   Additional items Assist x 1; Armrests; Increased time required;Verbal cues   Additional Comments Occasional verbal cues provided for safety awareness and pacing through transitional movements  Functional Mobility   Functional Mobility   (CGA)   Additional Comments Pt ambulated short distance from bed to recliner with no overt LOB  SpO2 noted to decrease to mid-80's post mobility  Pt deferred longer ambulation d/t nausea  SpO2 returned >90% with seated rest    Additional items   (no AD used with ambulation )   Balance   Static Sitting Good   Dynamic Sitting Fair +   Static Standing Fair   Dynamic Standing Fair -   Activity Tolerance   Activity Tolerance Patient limited by fatigue;Treatment limited secondary to medical complications (Comment)  (Nausea )   Medical Staff Made Aware Pt seen as a co-eval with PT due to the patient's co-morbidities, clinically unstable presentation, and present impairments which are a regression from the patient's baseline      Nurse Made Aware RN made aware of outcomes    RUE Assessment   RUE Assessment WFL  (4-/5 MMT based on functional assessment )   LUE Assessment   LUE Assessment WFL  (4-/5 MMT based on functional assessment )   Hand Function   Gross Motor Coordination Functional   Fine Motor Coordination Functional   Vision-Basic Assessment   Current Vision Wears glasses all the time   Patient Visual Report   (no significant changes reported )   Cognition   Overall Cognitive Status WFL   Arousal/Participation Alert; Responsive; Cooperative   Attention Within functional limits   Orientation Level Oriented X4   Memory Within functional limits   Following Commands Follows all commands and directions without difficulty   Assessment   Limitation Decreased ADL status; Decreased UE strength;Decreased endurance;Decreased self-care trans;Decreased high-level ADLs   Prognosis Good   Assessment Patient is a 74 y o  male seen for OT evaluation s/p admit to [facility] on 1/21/2022 w/Acute on chronic diastolic congestive heart failure ([facility] )  Commorbidities affecting patient's functional performance at time of assessment include: ELZBIETA, colon cancer, diabetic ulcer of L foot, HTN, hyperkalemia, NICOLASA, and SSS  Orders placed for OT evaluation and treatment and up with assistance  Performed at least two patient identifiers during session including name and wristband  Prior to admission, Patient reporting being independent with ADLs/IADLs, ambulatory with no AD and lives with family in a two story house  Personal factors affecting patient at time of initial evaluation include: steps to enter, difficulty performing ADLs and difficulty performing IADLs  Upon evaluation, patient requires supervision assist for UB ADLs, minimal  assist for LB ADLs, transfers and functional ambulation in room and bathroom with contact guard assist, with the use of no AD    Patient is oriented x 4 and presents with ability to recognize a problem, define a problem, identify alternative plan, select a plan, organize steps in a plan, implement plan and evaluate outcome (problem solving)  Occupational performance is affected by the following deficits: decreased muscle strength, dynamic sit/ stand balance deficit with poor standing tolerance time for self care and functional mobility, decreased activity tolerance and delayed righting and equilibrium reactions  Based on the mentioned OT evaluation outcomes, functional performance deficits, and assessment findings, pt has been identified as a moderate complexity evaluation  Patient to benefit from continued Occupational Therapy treatment while in the hospital to address deficits as defined above and maximize level of functional independence with ADLs and functional mobility  Occupational Performance areas to address include: eating, grooming , bathing/ shower, dressing, toilet hygiene, transfer to all surfaces, functional ambulation, medication routine/ management, IADLS: Household maintenance, IADLs: safety procedures and IADLs: meal prep/ clean up  From OT standpoint, recommendation at time of d/c would be Home with home health rehabilitation  Goals   Patient Goals to feel better    Plan   Treatment Interventions ADL retraining;Functional transfer training;UE strengthening/ROM; Endurance training;Patient/family training; Compensatory technique education; Activityengagement; Energy conservation   Goal Expiration Date 02/03/22   OT Treatment Day 0   OT Frequency 3-5x/wk   Recommendation   OT Discharge Recommendation Home with home health rehabilitation   OT - OK to Discharge Yes  (Once medically cleared )   Additional Comments  At end of eval, pt seated OOB in recliner with call bell within reach    Additional Comments 2 The patient's raw score on the AM-PAC Daily Activity inpatient short form is 20, standardized score is 42 03, greater than 39 4  Patients at this level are likely to benefit from discharge to home   Please refer to the recommendation of the Occupational Therapist for safe discharge planning  AM-PAC Daily Activity Inpatient   Lower Body Dressing 3   Bathing 3   Toileting 3   Upper Body Dressing 3   Grooming 4   Eating 4   Daily Activity Raw Score 20   Daily Activity Standardized Score (Calc for Raw Score >=11) 42 03   AM-PAC Applied Cognition Inpatient   Following a Speech/Presentation 4   Understanding Ordinary Conversation 4   Taking Medications 4   Remembering Where Things Are Placed or Put Away 4   Remembering List of 4-5 Errands 4   Taking Care of Complicated Tasks 4   Applied Cognition Raw Score 24   Applied Cognition Standardized Score 62 21     GOALS:    *ADL transfers with (I) for inc'd independence with ADLs/purposeful tasks    *UB ADL with (I) for inc'd independence with self cares    *LB ADL with (I) using AE prn for inc'd independence with self cares    *Toileting with (I) for clothing management and hygiene for return to PLOF with personal care    *Increase stand tolerance x5 m for inc'd tolerance with standing purposeful tasks    *Participate in 10m UE therex to increase overall stamina/activity tolerance for purposeful tasks    *Bed mobility- (I) for inc'd independence to manage own comfort and initiate EOB & OOB purposeful tasks    *Patient will verbalize 3 safety awareness/ principles to prevent falls in the home setting  *Patient will verbalize and demonstrate use of energy conservation/deep breathing techniques and work simplification skills during functional activities with no verbal cues  *Patient will increase OOB/sitting tolerance to 2-4 hours per day to increase participation in self-care and leisure tasks with no s/s of exertion       Juwan Fall, ARIEL, OTR/L

## 2022-01-24 NOTE — ASSESSMENT & PLAN NOTE
· Presented for shortness of breath while receiving fluids at infusion center due to nausea from chemotherapy  · He is on 4 L of oxygen chronically  · He was placed on CPAP by EMS prior to arrival for increased work of breathing  · EKG:  Paced rhythm  · BNP: 1067  · Chest x-ray: Persistent or recurrent pulmonary vascular congestion persistent cardiomegaly   · He received 40 mg of IV Lasix followed by Flomax 2 mg IV x 2-ultimately required initiation of dialysis  · Daily weights and I&Os  · Cardiology recommendations appreciated

## 2022-01-24 NOTE — RESPIRATORY THERAPY NOTE
01/24/22 0355   Respiratory Assessment   Resp Comments rn messaged pt wanted off cpap placed back to nc sancho well    O2 Device Nc

## 2022-01-24 NOTE — PLAN OF CARE
Problem: Nutrition/Hydration-ADULT  Goal: Nutrient/Hydration intake appropriate for improving, restoring or maintaining nutritional needs  Description: Monitor and assess patient's nutrition/hydration status for malnutrition  Collaborate with interdisciplinary team and initiate plan and interventions as ordered  Monitor patient's weight and dietary intake as ordered or per policy  Utilize nutrition screening tool and intervene as necessary  Determine patient's food preferences and provide high-protein, high-caloric foods as appropriate       INTERVENTIONS:  - Monitor oral intake, urinary output, labs, and treatment plans  - Assess nutrition and hydration status and recommend course of action  - Evaluate amount of meals eaten  - Assist patient with eating if necessary   - Allow adequate time for meals  - Recommend/ encourage appropriate diets, oral nutritional supplements, and vitamin/mineral supplements  - Order, calculate, and assess calorie counts as needed  - Recommend, monitor, and adjust tube feedings and TPN/PPN based on assessed needs  - Assess need for intravenous fluids  - Provide specific nutrition/hydration education as appropriate  - Include patient/family/caregiver in decisions related to nutrition  Outcome: Progressing     Problem: RESPIRATORY - ADULT  Goal: Achieves optimal ventilation and oxygenation  Description: INTERVENTIONS:  - Assess for changes in respiratory status  - Assess for changes in mentation and behavior  - Position to facilitate oxygenation and minimize respiratory effort  - Oxygen administered by appropriate delivery if ordered  - Initiate smoking cessation education as indicated  - Encourage broncho-pulmonary hygiene including cough, deep breathe, Incentive Spirometry  - Assess the need for suctioning and aspirate as needed  - Assess and instruct to report SOB or any respiratory difficulty  - Respiratory Therapy support as indicated  Outcome: Progressing     Problem: GASTROINTESTINAL - ADULT  Goal: Minimal or absence of nausea and/or vomiting  Description: INTERVENTIONS:  - Administer IV fluids if ordered to ensure adequate hydration  - Maintain NPO status until nausea and vomiting are resolved  - Nasogastric tube if ordered  - Administer ordered antiemetic medications as needed  - Provide nonpharmacologic comfort measures as appropriate  - Advance diet as tolerated, if ordered  - Consider nutrition services referral to assist patient with adequate nutrition and appropriate food choices  Outcome: Progressing  Goal: Maintains or returns to baseline bowel function  Description: INTERVENTIONS:  - Assess bowel function  - Encourage oral fluids to ensure adequate hydration  - Administer IV fluids if ordered to ensure adequate hydration  - Administer ordered medications as needed  - Encourage mobilization and activity  - Consider nutritional services referral to assist patient with adequate nutrition and appropriate food choices  Outcome: Progressing  Goal: Maintains adequate nutritional intake  Description: INTERVENTIONS:  - Monitor percentage of each meal consumed  - Identify factors contributing to decreased intake, treat as appropriate  - Assist with meals as needed  - Monitor I&O, weight, and lab values if indicated  - Obtain nutrition services referral as needed  Outcome: Progressing  Goal: Oral mucous membranes remain intact  Description: INTERVENTIONS  - Assess oral mucosa and hygiene practices  - Implement preventative oral hygiene regimen  - Implement oral medicated treatments as ordered  - Initiate Nutrition services referral as needed  Outcome: Progressing     Problem: PAIN - ADULT  Goal: Verbalizes/displays adequate comfort level or baseline comfort level  Description: Interventions:  - Encourage patient to monitor pain and request assistance  - Assess pain using appropriate pain scale  - Administer analgesics based on type and severity of pain and evaluate response  - Implement non-pharmacological measures as appropriate and evaluate response  - Consider cultural and social influences on pain and pain management  - Notify physician/advanced practitioner if interventions unsuccessful or patient reports new pain  Outcome: Progressing     Problem: INFECTION - ADULT  Goal: Absence or prevention of progression during hospitalization  Description: INTERVENTIONS:  - Assess and monitor for signs and symptoms of infection  - Monitor lab/diagnostic results  - Monitor all insertion sites, i e  indwelling lines, tubes, and drains  - Monitor endotracheal if appropriate and nasal secretions for changes in amount and color  - [place] appropriate cooling/warming therapies per order  - Administer medications as ordered  - Instruct and encourage patient and family to use good hand hygiene technique  - Identify and instruct in appropriate isolation precautions for identified infection/condition  Outcome: Progressing     Problem: SAFETY ADULT  Goal: Patient will remain free of falls  Description: INTERVENTIONS:  - Educate patient/family on patient safety including physical limitations  - Instruct patient to call for assistance with activity   - Consult OT/PT to assist with strengthening/mobility   - Keep Call bell within reach  - Keep bed low and locked with side rails adjusted as appropriate  - Keep care items and personal belongings within reach  - Initiate and maintain comfort rounds  - Make Fall Risk Sign visible to staff  - Offer Toileting every 2 Hours, in advance of need  - Apply yellow socks and bracelet for high fall risk patients  - Consider moving patient to room near nurses station  Outcome: Progressing  Goal: Maintain or return to baseline ADL function  Description: INTERVENTIONS:  -  Assess patient's ability to carry out ADLs; assess patient's baseline for ADL function and identify physical deficits which impact ability to perform ADLs (bathing, care of mouth/teeth, toileting, grooming, dressing, etc )  - Assess/evaluate cause of self-care deficits   - Assess range of motion  - Assess patient's mobility; develop plan if impaired  - Assess patient's need for assistive devices and provide as appropriate  - Encourage maximum independence but intervene and supervise when necessary  - Involve family in performance of ADLs  - Assess for home care needs following discharge   - Consider OT consult to assist with ADL evaluation and planning for discharge  - Provide patient education as appropriate  Outcome: Progressing  Goal: Maintains/Returns to pre admission functional level  Description: INTERVENTIONS:  - Perform BMAT or MOVE assessment daily    - Set and communicate daily mobility goal to care team and patient/family/caregiver  - Collaborate with rehabilitation services on mobility goals if consulted  - Out of bed for toileting  - Record patient progress and toleration of activity level   Outcome: Progressing     Problem: DISCHARGE PLANNING  Goal: Discharge to home or other facility with appropriate resources  Description: INTERVENTIONS:  - Identify barriers to discharge w/patient and caregiver  - Arrange for needed discharge resources and transportation as appropriate  - Identify discharge learning needs (meds, wound care, etc )  - Arrange for interpretive services to assist at discharge as needed  - Refer to Case Management Department for coordinating discharge planning if the patient needs post-hospital services based on physician/advanced practitioner order or complex needs related to functional status, cognitive ability, or social support system  Outcome: Progressing     Problem: Knowledge Deficit  Goal: Patient/family/caregiver demonstrates understanding of disease process, treatment plan, medications, and discharge instructions  Description: Complete learning assessment and assess knowledge base    Interventions:  - Provide teaching at level of understanding  - Provide teaching via preferred learning methods  Outcome: Progressing     Problem: Prexisting or High Potential for Compromised Skin Integrity  Goal: Skin integrity is maintained or improved  Description: INTERVENTIONS:  - Identify patients at risk for skin breakdown  - Assess and monitor skin integrity  - Assess and monitor nutrition and hydration status  - Monitor labs   - Assess for incontinence   - Turn and reposition patient  - Assist with mobility/ambulation  - Relieve pressure over bony prominences  - Avoid friction and shearing  - Provide appropriate hygiene as needed including keeping skin clean and dry  - Evaluate need for skin moisturizer/barrier cream  - Collaborate with interdisciplinary team   - Patient/family teaching  - Consider wound care consult   Outcome: Progressing     Problem: MOBILITY - ADULT  Goal: Maintain or return to baseline ADL function  Description: INTERVENTIONS:  -  Assess patient's ability to carry out ADLs; assess patient's baseline for ADL function and identify physical deficits which impact ability to perform ADLs (bathing, care of mouth/teeth, toileting, grooming, dressing, etc )  - Assess/evaluate cause of self-care deficits   - Assess range of motion  - Assess patient's mobility; develop plan if impaired  - Assess patient's need for assistive devices and provide as appropriate  - Encourage maximum independence but intervene and supervise when necessary  - Involve family in performance of ADLs  - Assess for home care needs following discharge   - Consider OT consult to assist with ADL evaluation and planning for discharge  - Provide patient education as appropriate  Outcome: Progressing  Goal: Maintains/Returns to pre admission functional level  Description: INTERVENTIONS:  - Perform BMAT or MOVE assessment daily    - Set and communicate daily mobility goal to care team and patient/family/caregiver     - Collaborate with rehabilitation services on mobility goals if consulted  - Out of bed for toileting  - Record patient progress and toleration of activity level   Outcome: Progressing     Problem: Potential for Falls  Goal: Patient will remain free of falls  Description: INTERVENTIONS:  - Educate patient/family on patient safety including physical limitations  - Instruct patient to call for assistance with activity   - Consult OT/PT to assist with strengthening/mobility   - Keep Call bell within reach  - Keep bed low and locked with side rails adjusted as appropriate  - Keep care items and personal belongings within reach  - Initiate and maintain comfort rounds  - Make Fall Risk Sign visible to staff  - Offer Toileting every 2 Hours, in advance of need  - Apply yellow socks and bracelet for high fall risk patients  - Consider moving patient to room near nurses station  Outcome: Progressing     Problem: METABOLIC, FLUID AND ELECTROLYTES - ADULT  Goal: Electrolytes maintained within normal limits  Description: INTERVENTIONS:  - Monitor labs and assess patient for signs and symptoms of electrolyte imbalances  - Administer electrolyte replacement as ordered  - Monitor response to electrolyte replacements, including repeat lab results as appropriate  - Instruct patient on fluid and nutrition as appropriate  Outcome: Progressing  Goal: Fluid balance maintained  Description: INTERVENTIONS:  - Monitor labs   - Monitor I/O and WT  - Instruct patient on fluid and nutrition as appropriate  - Assess for signs & symptoms of volume excess or deficit  Outcome: Progressing

## 2022-01-24 NOTE — PLAN OF CARE
Hemodialysis treatment planned for 180 minutes using a 4 K+ bath for potassium 3 5 this morning  Fluid goal 1500 ml as tolerated  Post-Dialysis RN Treatment Note    Blood Pressure:  Pre 143/83 mm/Hg  Post 137/71 mmHg   EDW:  TBD   Weight:  Pre 94 2 kg   Post 93 2 kg   Mode of weight measurement:   Standing scale   Volume Removed  1507 ml    Treatment duration 180 minutes    NS given:  none    Treatment shortened:   no   Medications given during Rx:  none   Estimated Kt/V:  none   Access type:   RIJ temporary catheter   Access Issues:   Maintains 250 bfr   Report called to primary nurse:   Yes            Problem: METABOLIC, FLUID AND ELECTROLYTES - ADULT  Goal: Electrolytes maintained within normal limits  Description: INTERVENTIONS:  - Monitor labs and assess patient for signs and symptoms of electrolyte imbalances  - Administer electrolyte replacement as ordered  - Monitor response to electrolyte replacements, including repeat lab results as appropriate  - Instruct patient on fluid and nutrition as appropriate  Outcome: Progressing  Goal: Fluid balance maintained  Description: INTERVENTIONS:  - Monitor labs   - Monitor I/O and WT  - Instruct patient on fluid and nutrition as appropriate  - Assess for signs & symptoms of volume excess or deficit  Outcome: Progressing

## 2022-01-24 NOTE — ASSESSMENT & PLAN NOTE
-currently appears to be paced on telemetry with underlying atrial flutter heart rate 60 on telemetry  -continue current anticoagulation with Eliquis 5 mg twice daily  -continue metoprolol tartrate 50 mg twice daily  -continue monitor patient on telemetry at this time

## 2022-01-24 NOTE — RESPIRATORY THERAPY NOTE
01/24/22 0143   Respiratory Assessment   Assessment Type Assess only   General Appearance Sleeping   Respiratory Pattern Dyspnea with exertion;Normal   Chest Assessment Chest expansion symmetrical   Bilateral Breath Sounds Diminished;Clear   Resp Comments rn called pt sats on ra cpap dropped to 86% palced 2 l/m to mask pox 90% rn in room and aware    O2 Device cpap w/ o2    Non-Invasive Information   O2 Interface Device Full face mask  (small )   Non-Invasive Ventilation Mode CPAP  (vivo )   $ Intermittent NIV Yes   $ Pulse Oximetry Spot Check Charge Completed   Non-Invasive Settings   FiO2 (%)   (2 l/m to mask )   PEEP/CPAP (cm H2O) 11   Non-Invasive Readings   Skin Intervention Skin intact   Total Rate 18   Spontaneous Vt (mL) 552   Spontaneous MV (mL) 15   I/E Ratio (Obs) 1:1 7   Leak (lpm) 40   Non-Invasive Alarms   Insp Pressure Low (cm H20) 4   Vt Low (mL) 100   High Resp Rate (BPM) 40 BPM   Low Resp Rate (BPM) 4 BPM   Apnea Interval (sec) 30

## 2022-01-24 NOTE — ASSESSMENT & PLAN NOTE
Lab Results   Component Value Date    HGBA1C 5 7 (H) 12/14/2021       Recent Labs     01/23/22  1041 01/23/22  1615 01/23/22  2203 01/24/22  0700   POCGLU 242* 190* 251* 158*       Blood Sugar Average: Last 72 hrs:  (P) 954 [id number]     · Follows up with [facility] for 20 years-with skin graft for diabetic foot ulcer  · Wound care consult placed- patient's wife requests Podiatry consultation (pending)

## 2022-01-24 NOTE — NURSING NOTE
Pt straight cathed at this time for 800mls  16fr coude used, pt tolerated well  Urine clear yellow with slight odor  Sterile technique used, RN Ok K straight cath dave  Pt due to void 12:

## 2022-01-24 NOTE — ASSESSMENT & PLAN NOTE
Lab Results   Component Value Date    EGFR 11 01/24/2022    EGFR 8 01/23/2022    EGFR 8 01/22/2022    CREATININE 4 87 (H) 01/24/2022    CREATININE 6 14 (H) 01/23/2022    CREATININE 5 96 (H) 01/22/2022   -currently being followed by Nephrology  -volume management per Nephrology  -patient did require straight catheterization overnight for significant amount of urine output if agreeable may also need urology referral

## 2022-01-25 LAB
ANION GAP SERPL CALCULATED.3IONS-SCNC: 4 MMOL/L (ref 4–13)
ATRIAL RATE: 220 BPM
ATRIAL RATE: 241 BPM
ATRIAL RATE: 60 BPM
BASOPHILS # BLD AUTO: 0.01 THOUSANDS/ΜL (ref 0–0.1)
BASOPHILS NFR BLD AUTO: 0 % (ref 0–1)
BUN SERPL-MCNC: 50 MG/DL (ref 5–25)
CALCIUM SERPL-MCNC: 8.1 MG/DL (ref 8.4–10.2)
CHLORIDE SERPL-SCNC: 94 MMOL/L (ref 96–108)
CO2 SERPL-SCNC: 31 MMOL/L (ref 21–32)
CREAT SERPL-MCNC: 3.6 MG/DL (ref 0.6–1.3)
EOSINOPHIL # BLD AUTO: 0.3 THOUSAND/ΜL (ref 0–0.61)
EOSINOPHIL NFR BLD AUTO: 4 % (ref 0–6)
ERYTHROCYTE [DISTWIDTH] IN BLOOD BY AUTOMATED COUNT: 15.8 % (ref 11.6–15.1)
GFR SERPL CREATININE-BSD FRML MDRD: 16 ML/MIN/1.73SQ M
GLUCOSE SERPL-MCNC: 213 MG/DL (ref 65–140)
GLUCOSE SERPL-MCNC: 218 MG/DL (ref 65–140)
GLUCOSE SERPL-MCNC: 259 MG/DL (ref 65–140)
GLUCOSE SERPL-MCNC: 94 MG/DL (ref 65–140)
GLUCOSE SERPL-MCNC: 95 MG/DL (ref 65–140)
HCT VFR BLD AUTO: 29.5 % (ref 36.5–49.3)
HGB BLD-MCNC: 10 G/DL (ref 12–17)
IMM GRANULOCYTES # BLD AUTO: 0.09 THOUSAND/UL (ref 0–0.2)
IMM GRANULOCYTES NFR BLD AUTO: 1 % (ref 0–2)
LYMPHOCYTES # BLD AUTO: 1.16 THOUSANDS/ΜL (ref 0.6–4.47)
LYMPHOCYTES NFR BLD AUTO: 17 % (ref 14–44)
MCH RBC QN AUTO: 31.1 PG (ref 26.8–34.3)
MCHC RBC AUTO-ENTMCNC: 33.9 G/DL (ref 31.4–37.4)
MCV RBC AUTO: 92 FL (ref 82–98)
MONOCYTES # BLD AUTO: 0.8 THOUSAND/ΜL (ref 0.17–1.22)
MONOCYTES NFR BLD AUTO: 11 % (ref 4–12)
NEUTROPHILS # BLD AUTO: 4.67 THOUSANDS/ΜL (ref 1.85–7.62)
NEUTS SEG NFR BLD AUTO: 67 % (ref 43–75)
NRBC BLD AUTO-RTO: 1 /100 WBCS
P AXIS: 83 DEGREES
PLATELET # BLD AUTO: 168 THOUSANDS/UL (ref 149–390)
PMV BLD AUTO: 9.9 FL (ref 8.9–12.7)
POTASSIUM SERPL-SCNC: 3.2 MMOL/L (ref 3.5–5.3)
QRS AXIS: -72 DEGREES
QRS AXIS: -75 DEGREES
QRS AXIS: -78 DEGREES
QRSD INTERVAL: 184 MS
QRSD INTERVAL: 186 MS
QRSD INTERVAL: 192 MS
QT INTERVAL: 478 MS
QT INTERVAL: 482 MS
QT INTERVAL: 488 MS
QTC INTERVAL: 488 MS
QTC INTERVAL: 493 MS
QTC INTERVAL: 493 MS
RBC # BLD AUTO: 3.22 MILLION/UL (ref 3.88–5.62)
SODIUM SERPL-SCNC: 129 MMOL/L (ref 135–147)
T WAVE AXIS: 61 DEGREES
T WAVE AXIS: 64 DEGREES
T WAVE AXIS: 71 DEGREES
VENTRICULAR RATE: 60 BPM
VENTRICULAR RATE: 63 BPM
VENTRICULAR RATE: 64 BPM
WBC # BLD AUTO: 7.03 THOUSAND/UL (ref 4.31–10.16)

## 2022-01-25 PROCEDURE — 80048 BASIC METABOLIC PNL TOTAL CA: CPT | Performed by: INTERNAL MEDICINE

## 2022-01-25 PROCEDURE — 82948 REAGENT STRIP/BLOOD GLUCOSE: CPT

## 2022-01-25 PROCEDURE — 99232 SBSQ HOSP IP/OBS MODERATE 35: CPT | Performed by: NURSE PRACTITIONER

## 2022-01-25 PROCEDURE — 85025 COMPLETE CBC W/AUTO DIFF WBC: CPT | Performed by: NURSE PRACTITIONER

## 2022-01-25 PROCEDURE — 94760 N-INVAS EAR/PLS OXIMETRY 1: CPT

## 2022-01-25 PROCEDURE — 99232 SBSQ HOSP IP/OBS MODERATE 35: CPT

## 2022-01-25 PROCEDURE — 93010 ELECTROCARDIOGRAM REPORT: CPT | Performed by: INTERNAL MEDICINE

## 2022-01-25 PROCEDURE — 94660 CPAP INITIATION&MGMT: CPT

## 2022-01-25 RX ORDER — POTASSIUM CHLORIDE 20 MEQ/1
20 TABLET, EXTENDED RELEASE ORAL EVERY 4 HOURS
Status: DISPENSED | OUTPATIENT
Start: 2022-01-25 | End: 2022-01-25

## 2022-01-25 RX ADMIN — OMEGA-3 FATTY ACIDS CAP 1000 MG 1000 MG: 1000 CAP at 18:00

## 2022-01-25 RX ADMIN — METOPROLOL TARTRATE 50 MG: 50 TABLET, FILM COATED ORAL at 16:37

## 2022-01-25 RX ADMIN — AMLODIPINE BESYLATE 10 MG: 10 TABLET ORAL at 10:01

## 2022-01-25 RX ADMIN — DIPHENOXYLATE HYDROCHLORIDE AND ATROPINE SULFATE 1 TABLET: 2.5; .025 TABLET ORAL at 16:49

## 2022-01-25 RX ADMIN — POTASSIUM CHLORIDE 20 MEQ: 1500 TABLET, EXTENDED RELEASE ORAL at 13:43

## 2022-01-25 RX ADMIN — Medication 250 MG: at 18:00

## 2022-01-25 RX ADMIN — METOPROLOL TARTRATE 50 MG: 50 TABLET, FILM COATED ORAL at 06:09

## 2022-01-25 RX ADMIN — TAMSULOSIN HYDROCHLORIDE 0.4 MG: 0.4 CAPSULE ORAL at 16:44

## 2022-01-25 RX ADMIN — INSULIN LISPRO 2 UNITS: 100 INJECTION, SOLUTION INTRAVENOUS; SUBCUTANEOUS at 22:30

## 2022-01-25 RX ADMIN — APIXABAN 5 MG: 5 TABLET, FILM COATED ORAL at 18:00

## 2022-01-25 RX ADMIN — INSULIN LISPRO 2 UNITS: 100 INJECTION, SOLUTION INTRAVENOUS; SUBCUTANEOUS at 16:39

## 2022-01-25 RX ADMIN — CALCIUM ACETATE 667 MG: 667 CAPSULE ORAL at 16:38

## 2022-01-25 RX ADMIN — INSULIN LISPRO 3 UNITS: 100 INJECTION, SOLUTION INTRAVENOUS; SUBCUTANEOUS at 13:30

## 2022-01-25 RX ADMIN — APIXABAN 5 MG: 5 TABLET, FILM COATED ORAL at 06:09

## 2022-01-25 RX ADMIN — FAMOTIDINE 20 MG: 20 TABLET ORAL at 10:01

## 2022-01-25 RX ADMIN — OMEGA-3 FATTY ACIDS CAP 1000 MG 1000 MG: 1000 CAP at 10:01

## 2022-01-25 RX ADMIN — Medication 250 MG: at 10:01

## 2022-01-25 NOTE — ASSESSMENT & PLAN NOTE
Lab Results   Component Value Date    EGFR 16 01/25/2022    EGFR 11 01/24/2022    EGFR 8 01/23/2022    CREATININE 3 60 (H) 01/25/2022    CREATININE 4 87 (H) 01/24/2022    CREATININE 6 14 (H) 01/23/2022   -currently being followed by Nephrology  -volume management per Nephrology

## 2022-01-25 NOTE — ASSESSMENT & PLAN NOTE
Lab Results   Component Value Date    HGBA1C 5 7 (H) 12/14/2021       Recent Labs     01/24/22 2012 01/25/22  0553 01/25/22  1138 01/25/22  1609   POCGLU 178* 94 259* 218*       Blood Sugar Average: Last 72 hrs:  (P) 109 [id number]     · Diabetic diet  · Fingerstick blood sugar with sliding scale coverage  · Hold home insulin [employer]

## 2022-01-25 NOTE — PROGRESS NOTES
NEPHROLOGY PROGRESS NOTE   Yamilex Part 74 y o  male MRN: [id number]  Unit/Bed#: -01 Encounter: [id number]    Assessment/Plan:    Ti Connor is a 75 yo man whose medical problems include CKD 4 with nephrotic syndrome, suspected diabetic nephropathy, recently diagnosed with colon cancer status post 1st chemotherapy treatment last week however developed profound nausea and required fluids and Zofran at infusion center admitted 1/21 with chief complaint of dyspnea at infusion center being treated for pulmonary edema, hemodialysis dependent acute kidney injury, electrolyte derangements, acid-base derangements  Renal following along for ELZBIETA/CKD/acidosis/hyperkalemia/oliguria  Plan outlined below       1  Hemodialysis dependent acute kidney injury POA  ? First hemodialysis session 1/23 and 2nd hemodialysis session 1/24  Dialysis will be deferred today due to Emergency elsewhere and will check BMP in a m     If renal function worsens tomorrow or volume/electrolyte/acid-base derangements present, will recommend placement of PermCath and outpatient dialysis arrangements  At this time, this plan is on hold and he will be further evaluated tomorrow  ? Liberate potassium in diet  ? Continue fluid restriction  ? Will hold off on any diuretics today  2  Access  ? Right IJ temporary hemodialysis catheter  3  Stage 4 chronic kidney disease with most recent baseline creatinine around 2 6 mg/dL  4  Hyperkalemia now hypokalemic  ? Liberate potassium in diet and provide 40 mEq oral potassium today  5  Pulmonary edema, acute on chronic diastolic congestive heart failure  ? Seemingly resolved  No repeat chest x-ray after ultrafiltration however is comfortable on room air with fairly clear lungs  Will hold off on diuretics today and re-evaluate tomorrow  Continue sodium and fluid restriction  6  Elevated anion gap metabolic acidosis, uremic acidosis  ? Resolved  7  Adenocarcinoma of the colon  8   Side effect of chemotherapy  ? Significantly improved and tolerating full meals  9  Suspected diabetic nephropathy  ? Not a candidate for an Ace or Arb at this time  10  History of contrast induced nephropathy 11/2021  11  Left arm edema  · Rule out DVT  Check duplex    ROS  No physical complaints  States he feels much better  Nausea is gone  Eating full meals  No further urinary retention  A complete 10 point review of systems have been performed and are otherwise negative         Historical Information   Past Medical History:   Diagnosis Date    Anemia     iron def    Arthritis     OA    Bruise of both arms     forearms and both hands    Bruises easily     [facility]2021    colon    CH[facility]     Chronic kidney disease     CPAP (continuous positive airway pressure) dependence     Diabetes mellitus ([facility] )     Di[facility] ulcer [facility]     Eczema     Erectile dysfunction     Gout     Heart murmur     History of permanent cardiac pacemaker placement 11/2018    Hyperlipidemia     Hypertension     Hypoglycemia 3/8/2019    Murmur     Nephropathy     Osteoarthritis     Pacemaker 11/06/2018    PAD [facility]peripheral artery disease) ([facility]     Seasonal allergies     Sleep apnea     Toe infection     bilat great toes    Vertigo     Walks frequently     Wears dentures     upper    Wears glasses      Past Surgical History:   Procedure Laterality Date    CARDIAC PACEMAKER PLACEMENT      CARPAL TUNNEL RELEASE Left     CARPAL TUNNEL RELEASE Right     CARPAL TUNNEL RELEASE      COLONOSCOPY  08/2020    COLONOSCOPY      FL GUIDED CENTRAL VENOUS ACCESS DEVICE INSERTION  1/11/2022    FOOT AMPUTATION Left 2/10/2020    Procedure: PARTIAL 1ST RAY AMPUTATION;  Surgeon: Emma Lyles DPM;  [facility];  Service: Podiatry    INCISION AND DRAINAGE OF WOUND Left 1/12/2020    Procedure: INCISION AND DRAINAGE (I&D) EXTREMITY AND REMOVAL OF SESMOID BONE;  Surgeon: Rema Perry DPM; [facility];  Service: Podiatry    IR PICC REPOSITION  2020    KNEE ARTHROSCOPY Left     KNEE ARTHROSCOPY Right     KNEE SURGERY      HI AMPUTATION TOE,I-P JT Bilateral 2017    Procedure: PARTIAL AMPUTATION RIGHT AND LEFT HALLUX ;  Surgeon: Joy Castellanos DPM;  [facility];  Service: Podiatry    HI AMPUTATION TOE,MT-P JT Left 2017    Procedure: 2ND TOE AMPUTATION;  Surgeon: Joy Castellanos DPM;  [facility];  Service: Podiatry    HI INSJ TUNNELED CTR VAD W/SUBQ PORT AGE 5 YR/> N/A 2022    Procedure: INSERTION VENOUS PORT ( PORT-A-CATH) IR;  Surgeon: Sofía Hernandez DO;  [facility];  Service: Interventional Radiology    RESECTION LOW ANTERIOR LAPAROSCOPIC N/A 10/21/2021    Procedure: RESECTION LOW ANTERIOR LAPAROSCOPIC;  Surgeon: Kentrell Rendon MD;  [facility];  Service: Colorectal    SHOULDER ARTHROSCOPY Left     with screws,RTC    SHOULDER SURGERY      TOE AMPUTATION Left 2020    Procedure: Elsa Grow;  Surgeon: Teresa Alvares DPM;  [facility];  Service: Podiatry    UPPER GASTROINTESTINAL ENDOSCOPY  2020    Intestinal metaplasia prepyloric    VASECTOMY       Social History   Social History     Substance and Sexual Activity   Alcohol Use Never    Alcohol/week: 0 0 standard drinks     Social History     Substance and Sexual Activity   Drug Use No     Social History     Tobacco Use   Smoking Status Former Smoker    Packs/day: 0 50    Years: 20 00    Pack years: 10 00    Types: Cigarettes    Start date: 1    Quit date:     Years since quittin 0   Smokeless Tobacco Never Used   Tobacco Comment    quit 10 years ago       Family History:   Family History   Problem Relation Age of Onset    Heart disease Father         passed away    Hypertension Father     Pulmonary embolism Father     Hypertension Mother         assed away       Medications:  Pertinent medications were reviewed  Current Facility-Administered Medications   Medication Dose Route Frequency Provider Last Rate    amLODIPine  10 mg Oral Daily Fraser Clear, CRNAVDEEP      apixaban  5 mg Oral Q12H Suezsilvestree Sterling, CRNAVDEEP      calcium acetate  667 mg Oral Daily With Raye Leyden, CRNP      diphenoxylate-atropine  1 tablet Oral 4x Daily PRN Naoma MOE Mclean      famotidine  20 mg Oral Daily Suezanne Sterling, PAT      fish oil  1,000 mg Oral BID Suezanne Sterling, CRNAVDEEP      insulin lispro  1-6 Units Subcutaneous TID AC Suezanne Sterling, CRNP      insulin lispro  1-6 Units Subcutaneous HS Suezanne Sterling, CRNP      metoprolol tartrate  50 mg Oral Q12H Suezanne Sterling, PAT      ondansetron  4 mg Intravenous Q4H PRN Hilda Hose, PAT      potassium chloride  20 mEq Oral Q4H Fraser Clear, CRNAVDEEP      prochlorperazine  5 mg Oral Q6H PRN Graciela Scanlon PA-C      saccharomyces boulardii  250 mg Oral BID [place], [place]      tamsulosin  0 4 mg Oral Daily With Dinner Riley Katz, PAT           Allergies   Allergen Reactions    Invokana [Canagliflozin] Other (See Comments)     Caused circulation problems    Lamisil [Terbinafine] Blisters     Wife states " His skin peeled from head to toe"    Other Swelling     Pomegranate - facial swelling, no swelling of tongue, esophagus  Adhesive tape   Latex Rash         Vitals:   /68   Pulse 63   Temp 98 2 °F (36 8 °C)   Resp 18   Ht 5' 10" (1 778 m)   Wt 97 1 kg (214 lb 1 1 oz)   SpO2 (!) 89%   BMI 30 72 kg/m²   Body mass index is 30 72 kg/m²    SpO2: (!) 89 %,   SpO2 Activity: At Rest,   O2 Device: Nasal cannula      Intake/Output Summary (Last 24 hours) at 1/25/2022 1313  Last data filed at 1/24/2022 1730  Gross per 24 hour   Intake 240 ml   Output 148 ml   Net 92 ml     Invasive Devices  Report    Central Venous Catheter Line            Port A Cath Right Subclavian -- days          Hemodialysis Catheter            HD Temporary Double Catheter 2 days                Physical Exam  General: conscious, cooperative, in no acute distress  Eyes: conjunctivae pink, anicteric sclerae  ENT: lips and mucous membranes moist  Neck: supple, no JVD, no masses  Chest: clear breath sounds bilaterally, no crackles, ronchus or wheezings  CVS: S1 & S2, normal rate, regular rhythm  Abdomen: soft, non-tender, non-distended, normoactive bowel sounds  Extremities:  Trace edema of both legs, left arm significantly swollen  Skin: no rash  Neuro: awake, alert, oriented      Diagnostic Data:  Lab: I have personally reviewed pertinent lab results  ,   CBC:  Results from last 7 days   Lab Units 01/25/22  0605   WBC Thousand/uL 7 03   HEMOGLOBIN g/dL 10 0*   HEMATOCRIT % 29 5*   PLATELETS Thousands/uL 168      CMP:   Lab Results   Component Value Date    SODIUM 129 (L) 01/25/2022    K 3 2 (L) 01/25/2022    CL 94 (L) 01/25/2022    CO2 31 01/25/2022    BUN 50 (H) 01/25/2022    CREATININE 3 60 (H) 01/25/2022    CALCIUM 8 1 (L) 01/25/2022    EGFR 16 01/25/2022   ,   PT/INR: No results found for: PT, INR,   Magnesium: No components found for: MAG,  Phosphorous: No results found for: PHOS    Microbiology:  @LABRCNTIP,(urinecx:7)@        PAT Galan    Portions of the record may have been created with voice recognition software  Occasional wrong word or "sound a like" substitutions may have occurred due to the inherent limitations of voice recognition software  Read the chart carefully and recognize, using context, where substitutions have occurred

## 2022-01-25 NOTE — ASSESSMENT & PLAN NOTE
Lab Results   Component Value Date    EGFR 16 01/25/2022    EGFR 11 01/24/2022    EGFR 8 01/23/2022    CREATININE 3 60 (H) 01/25/2022    CREATININE 4 87 (H) 01/24/2022    CREATININE 6 14 (H) 01/23/2022     · History of CKD, recently started chemotherapy for colon cancer with significant worsening of kidney function with hospitalization for creatinine around 0 7  Baseline creatinine level is around 3  · ER PA consulted nephrology who recommended Lasix 80 mg and nephrology consultation  · I&O, daily weights  · Urinary retention protocol  · Nephrology recommendations appreciated- dialysis was initiated due to volume overload and electrolyte abnormalities  Will reevaluate tomorrow after HD regarding whether HD will be permanent

## 2022-01-25 NOTE — WOUND OSTOMY CARE
Consult Note - Wound   Harley Yanes 74 y o  male MRN: [id number]  Unit/Bed#: -01 Encounter: [id number]        History and Present Illness:  Patient admitted with acute on chronic congestive heart failure  Wound care consulted for left foot wound  Patient history significant for DDM2, diabetic ulcer of the left foot, peripheral artery disease, colon cancer- currently getting chemo treatments, RSV and MDRO  Assessment Findings:   Patient in bed for assessment  Wife at the bedside  Per the wife, she was in contact with the [facility] regarding the recent skin graft patient had approx 2 weeks ago  She states she was told that nothing was to be done with the left foot wound  Dressing dry and intact  Spoke with patient later to clarify that the dressing to the left foot wound is to stay intact  Patient's wife requested podiatry assess the wound  Did skin assessment on patient  Patient is not incontinent  1  Bilateral heels intact  2  Abdominal and groin folds intact  3  Sacrum and buttocks intact    Skin Care Plan:   1  Apply skin nourishing cream to the skin daily  2  Elevate heels off of bed with pillows to offload  3  Ehob offloading cushion to chair when OOB  4   Apply hydraguard to bilateral heels TID    Wounds:    Reviewed plan of care with primary RN Alfredo Isidro  Recommendations written as orders  Wound care team to sign off  Questions or concerns [phone] [facility] Nurse    Barak AVILEZN, RN, [facility]

## 2022-01-25 NOTE — PLAN OF CARE
Problem: Nutrition/Hydration-ADULT  Goal: Nutrient/Hydration intake appropriate for improving, restoring or maintaining nutritional needs  Description: Monitor and assess patient's nutrition/hydration status for malnutrition  Collaborate with interdisciplinary team and initiate plan and interventions as ordered  Monitor patient's weight and dietary intake as ordered or per policy  Utilize nutrition screening tool and intervene as necessary  Determine patient's food preferences and provide high-protein, high-caloric foods as appropriate       INTERVENTIONS:  - Monitor oral intake, urinary output, labs, and treatment plans  - Assess nutrition and hydration status and recommend course of action  - Evaluate amount of meals eaten  - Assist patient with eating if necessary   - Allow adequate time for meals  - Recommend/ encourage appropriate diets, oral nutritional supplements, and vitamin/mineral supplements  - Order, calculate, and assess calorie counts as needed  - Recommend, monitor, and adjust tube feedings and TPN/PPN based on assessed needs  - Assess need for intravenous fluids  - Provide specific nutrition/hydration education as appropriate  - Include patient/family/caregiver in decisions related to nutrition  Outcome: Progressing     Problem: RESPIRATORY - ADULT  Goal: Achieves optimal ventilation and oxygenation  Description: INTERVENTIONS:  - Assess for changes in respiratory status  - Assess for changes in mentation and behavior  - Position to facilitate oxygenation and minimize respiratory effort  - Oxygen administered by appropriate delivery if ordered  - Initiate smoking cessation education as indicated  - Encourage broncho-pulmonary hygiene including cough, deep breathe, Incentive Spirometry  - Assess the need for suctioning and aspirate as needed  - Assess and instruct to report SOB or any respiratory difficulty  - Respiratory Therapy support as indicated  Outcome: Progressing     Problem: GASTROINTESTINAL - ADULT  Goal: Minimal or absence of nausea and/or vomiting  Description: INTERVENTIONS:  - Administer IV fluids if ordered to ensure adequate hydration  - Maintain NPO status until nausea and vomiting are resolved  - Nasogastric tube if ordered  - Administer ordered antiemetic medications as needed  - Provide nonpharmacologic comfort measures as appropriate  - Advance diet as tolerated, if ordered  - Consider nutrition services referral to assist patient with adequate nutrition and appropriate food choices  Outcome: Progressing  Goal: Maintains or returns to baseline bowel function  Description: INTERVENTIONS:  - Assess bowel function  - Encourage oral fluids to ensure adequate hydration  - Administer IV fluids if ordered to ensure adequate hydration  - Administer ordered medications as needed  - Encourage mobilization and activity  - Consider nutritional services referral to assist patient with adequate nutrition and appropriate food choices  Outcome: Progressing  Goal: Maintains adequate nutritional intake  Description: INTERVENTIONS:  - Monitor percentage of each meal consumed  - Identify factors contributing to decreased intake, treat as appropriate  - Assist with meals as needed  - Monitor I&O, weight, and lab values if indicated  - Obtain nutrition services referral as needed  Outcome: Progressing  Goal: Oral mucous membranes remain intact  Description: INTERVENTIONS  - Assess oral mucosa and hygiene practices  - Implement preventative oral hygiene regimen  - Implement oral medicated treatments as ordered  - Initiate Nutrition services referral as needed  Outcome: Progressing     Problem: PAIN - ADULT  Goal: Verbalizes/displays adequate comfort level or baseline comfort level  Description: Interventions:  - Encourage patient to monitor pain and request assistance  - Assess pain using appropriate pain scale  - Administer analgesics based on type and severity of pain and evaluate response  - Implement non-pharmacological measures as appropriate and evaluate response  - Consider cultural and social influences on pain and pain management  - Notify physician/advanced practitioner if interventions unsuccessful or patient reports new pain  Outcome: Progressing     Problem: INFECTION - ADULT  Goal: Absence or prevention of progression during hospitalization  Description: INTERVENTIONS:  - Assess and monitor for signs and symptoms of infection  - Monitor lab/diagnostic results  - Monitor all insertion sites, i e  indwelling lines, tubes, and drains  - Monitor endotracheal if appropriate and nasal secretions for changes in amount and color  - [place] appropriate cooling/warming therapies per order  - Administer medications as ordered  - Instruct and encourage patient and family to use good hand hygiene technique  - Identify and instruct in appropriate isolation precautions for identified infection/condition  Outcome: Progressing     Problem: SAFETY ADULT  Goal: Patient will remain free of falls  Description: INTERVENTIONS:  - Educate patient/family on patient safety including physical limitations  - Instruct patient to call for assistance with activity   - Consult OT/PT to assist with strengthening/mobility   - Keep Call bell within reach  - Keep bed low and locked with side rails adjusted as appropriate  - Keep care items and personal belongings within reach  - Initiate and maintain comfort rounds  - Make Fall Risk Sign visible to staff  - Offer Toileting every 2 Hours, in advance of need  - Apply yellow socks and bracelet for high fall risk patients  - Consider moving patient to room near nurses station  Outcome: Progressing  Goal: Maintain or return to baseline ADL function  Description: INTERVENTIONS:  -  Assess patient's ability to carry out ADLs; assess patient's baseline for ADL function and identify physical deficits which impact ability to perform ADLs (bathing, care of mouth/teeth, toileting, grooming, dressing, etc )  - Assess/evaluate cause of self-care deficits   - Assess range of motion  - Assess patient's mobility; develop plan if impaired  - Assess patient's need for assistive devices and provide as appropriate  - Encourage maximum independence but intervene and supervise when necessary  - Involve family in performance of ADLs  - Assess for home care needs following discharge   - Consider OT consult to assist with ADL evaluation and planning for discharge  - Provide patient education as appropriate  Outcome: Progressing  Goal: Maintains/Returns to pre admission functional level  Description: INTERVENTIONS:  - Perform BMAT or MOVE assessment daily    - Set and communicate daily mobility goal to care team and patient/family/caregiver  - Collaborate with rehabilitation services on mobility goals if consulted  - Out of bed for toileting  - Record patient progress and toleration of activity level   Outcome: Progressing     Problem: DISCHARGE PLANNING  Goal: Discharge to home or other facility with appropriate resources  Description: INTERVENTIONS:  - Identify barriers to discharge w/patient and caregiver  - Arrange for needed discharge resources and transportation as appropriate  - Identify discharge learning needs (meds, wound care, etc )  - Arrange for interpretive services to assist at discharge as needed  - Refer to Case Management Department for coordinating discharge planning if the patient needs post-hospital services based on physician/advanced practitioner order or complex needs related to functional status, cognitive ability, or social support system  Outcome: Progressing     Problem: Knowledge Deficit  Goal: Patient/family/caregiver demonstrates understanding of disease process, treatment plan, medications, and discharge instructions  Description: Complete learning assessment and assess knowledge base    Interventions:  - Provide teaching at level of understanding  - Provide teaching via preferred learning methods  Outcome: Progressing     Problem: Prexisting or High Potential for Compromised Skin Integrity  Goal: Skin integrity is maintained or improved  Description: INTERVENTIONS:  - Identify patients at risk for skin breakdown  - Assess and monitor skin integrity  - Assess and monitor nutrition and hydration status  - Monitor labs   - Assess for incontinence   - Turn and reposition patient  - Assist with mobility/ambulation  - Relieve pressure over bony prominences  - Avoid friction and shearing  - Provide appropriate hygiene as needed including keeping skin clean and dry  - Evaluate need for skin moisturizer/barrier cream  - Collaborate with interdisciplinary team   - Patient/family teaching  - Consider wound care consult   Outcome: Progressing     Problem: MOBILITY - ADULT  Goal: Maintain or return to baseline ADL function  Description: INTERVENTIONS:  -  Assess patient's ability to carry out ADLs; assess patient's baseline for ADL function and identify physical deficits which impact ability to perform ADLs (bathing, care of mouth/teeth, toileting, grooming, dressing, etc )  - Assess/evaluate cause of self-care deficits   - Assess range of motion  - Assess patient's mobility; develop plan if impaired  - Assess patient's need for assistive devices and provide as appropriate  - Encourage maximum independence but intervene and supervise when necessary  - Involve family in performance of ADLs  - Assess for home care needs following discharge   - Consider OT consult to assist with ADL evaluation and planning for discharge  - Provide patient education as appropriate  Outcome: Progressing  Goal: Maintains/Returns to pre admission functional level  Description: INTERVENTIONS:  - Perform BMAT or MOVE assessment daily    - Set and communicate daily mobility goal to care team and patient/family/caregiver     - Collaborate with rehabilitation services on mobility goals if consulted  - Out of bed for toileting  - Record patient progress and toleration of activity level   Outcome: Progressing     Problem: Potential for Falls  Goal: Patient will remain free of falls  Description: INTERVENTIONS:  - Educate patient/family on patient safety including physical limitations  - Instruct patient to call for assistance with activity   - Consult OT/PT to assist with strengthening/mobility   - Keep Call bell within reach  - Keep bed low and locked with side rails adjusted as appropriate  - Keep care items and personal belongings within reach  - Initiate and maintain comfort rounds  - Make Fall Risk Sign visible to staff  - Offer Toileting every 2 Hours, in advance of need  - Apply yellow socks and bracelet for high fall risk patients  - Consider moving patient to room near nurses station  Outcome: Progressing     Problem: METABOLIC, FLUID AND ELECTROLYTES - ADULT  Goal: Electrolytes maintained within normal limits  Description: INTERVENTIONS:  - Monitor labs and assess patient for signs and symptoms of electrolyte imbalances  - Administer electrolyte replacement as ordered  - Monitor response to electrolyte replacements, including repeat lab results as appropriate  - Instruct patient on fluid and nutrition as appropriate  Outcome: Progressing  Goal: Fluid balance maintained  Description: INTERVENTIONS:  - Monitor labs   - Monitor I/O and WT  - Instruct patient on fluid and nutrition as appropriate  - Assess for signs & symptoms of volume excess or deficit  Outcome: Progressing

## 2022-01-25 NOTE — ASSESSMENT & PLAN NOTE
· AG 22, CO2 14, - improved after dialysis  · Nephrology following  May need permanent dialysis  Will be re-evaluating tomorrow    · Serial labs

## 2022-01-25 NOTE — ASSESSMENT & PLAN NOTE
-blood pressures appear reasonably well controlled  -continue current medical therapy with amlodipine and metoprolol

## 2022-01-25 NOTE — ASSESSMENT & PLAN NOTE
· Adenocarcinoma 01/08/2021 currently being treated with chemotherapy at [facility]  · Continue outpatient follow-up after resolution of acute issue

## 2022-01-25 NOTE — CASE MANAGEMENT
Case Management Discharge Planning Note    Patient name Shane Welch  Location [address] 225/-65 MRN [id number]  : 1955 Date 2022       Current Admission Date: 2022  Current Admission Diagnosis:Acute on chronic diastolic congestive heart failure [facility])   Patient Active Problem List    Diagnosis Date Noted    Acute on chronic diastolic congestive heart failure ([facility] ) 2022    Nausea 2022    Chemotherapy-induced neutropenia ([facility] ) 2022    Acute kidney injury superimposed on chronic kidney disease ([facility] ) 2021    RSV (respiratory syncytial virus pneumonia) 2021    Colon cancer ([facility] ) 10/23/2021    Absence of toe ([facility] ) 2021    Chronic obstructive pulmonary disease ([facility] ) 2020    SSS (sick sinus syndrome) ([facility] ) 2020    Onychomycosis 2020    Chronic painful diabetic neuropathy ([facility] ) 2020    Renovascular hypertension     Multiple drug resistant organism (MDRO) culture positive 2020    Amputation of left great toe ([facility] ) 2020    Osteomyelitis of left foot ([facility] ) 2020    Hyponatremia 2020    Iron deficiency anemia 2020    Anxiety 2020    Staphylococcus aureus bacteremia 2020    Elevated troponin I level 2020    Type 2 diabetes mellitus with diabetic neuropathy, without long-term current use of insulin ([facility] ) 2020    S/P amputation of lesser toe, left ([facility] ) 2019    S/P amputation of lesser toe, right ([facility] ) 2019    Left renal artery stenosis (HCC) 2019    Chronic diastolic (congestive) heart failure (HCC) 2019    Acute pulmonary edema (HCC)     Hyperkalemia 2019    Abnormal CT of the chest 05/15/2019    Mitral valve stenosis     Anemia 2019    Fever 2019    Nonrheumatic aortic valve stenosis 2018    Type 2 diabetes mellitus with chronic kidney disease, with long-term current use of insulin ([facility] ) 2018    Hypertension 11/14/2018    Stage 4 chronic kidney disease ([facility] ) 11/14/2018    NICOLASA (obstructive sleep apnea) 11/11/2018    Urinary retention 11/06/2018    Persistent proteinuria 11/05/2018    Volume overload 11/05/2018    PAF (paroxysmal atrial fibrillation) ([facility] ) 11/04/2018    Complete heart block (HCC) 78/84/6537    Metabolic acidosis 77/08/3806    Acute kidney injury ([place] [address] ) 11/01/2018    Other hyperlipidemia 11/01/2018    Easy bruising 09/27/2017    Peripheral arterial disease ([place] [address] ) 09/06/2017    Vertigo 08/16/2017    Diabetic ulcer of left foot ([place] [address] ) 03/18/2017    Eczema 12/11/2015    PVCs (premature ventricular contractions) 73/26/8672    Systolic murmur 69/99/2482    Bilateral leg edema 07/09/2015    Erectile dysfunction of non-organic origin 04/24/2012    Gout 04/24/2012    Hyperlipidemia 04/24/2012    Uremic acidosis 04/24/2012    Arthritis 04/24/2012    Rhinitis 04/24/2012      LOS (days): 3  Geometric Mean LOS (GMLOS) (days): 4 30  Days to GMLOS:1 1     OBJECTIVE:  Risk of Unplanned Readmission Score: 33   Bundled Patient Payment: No Current Bundle     Current admission status: Inpatient   Preferred Pharmacy:   [facility]pharmacy #[id number] [address] [place] [address]  Phone: [phone] Fax: [phone]    Primary Care Provider: Yodit Quarles DO    Primary Insurance: MEDICARE  Secondary Insurance: [facility]    DISCHARGE DETAILS:    Discharge planning discussed with[de-identified] patient and wife  Freedom of Choice: Yes  Comments - Freedom of Choice: hhc was receommend, wife requested hhc pt had in the past  CM contacted family/caregiver?: Yes             Contacts  Patient Contacts: Janus Cousins  Relationship to Patient[de-identified] Family (wife)  Contact Method:  In Person  Reason/Outcome: Discharge [address]         Is the patient interested in [address] at discharge?: Yes  [address] requested[de-identified] [facility] Name[de-identified] Other  [address] Provider[de-identified] PCP  Home Health Services Needed[de-identified] Strengthening/Theraputic Exercises to Improve Function,Wound/Ostomy Care,Heart Failure Management  Homebound Criteria Met[de-identified] Requires the Assistance of Another Person for Safe Ambulation or to Leave the Home  Supporting Clincal Findings[de-identified] Limited Endurance         Other Referral/Resources/Interventions Provided:  Interventions: HHC,Dialysis  Referral Comments: referral sent to [facility] as requested, he had them in the past   pt is receiving temporay dialysis, cm spoke wiht Dr Tamara Marquez and she is not sure if it will be permanent, if parmanent then an outpt dialysis chair needs to be set up    Would you like to participate in our [address] [place] [address] service program?  : No - Declined                                        IMM Given (Date):: 01/24/22  IMM Given to[de-identified] Family (wife)

## 2022-01-25 NOTE — ASSESSMENT & PLAN NOTE
· Cardiology consult appreciated  -currently appears to be paced on telemetry with underlying atrial flutter heart rate 60 on telemetry  -continue current anticoagulation with Eliquis 5 mg twice daily  -continue metoprolol tartrate 50 mg twice daily

## 2022-01-25 NOTE — ASSESSMENT & PLAN NOTE
-currently appears to be paced on telemetry with underlying atrial flutter heart rate 60 on telemetry  -continue current anticoagulation with Eliquis 5 mg twice daily  -continue metoprolol tartrate 50 mg twice daily

## 2022-01-25 NOTE — ASSESSMENT & PLAN NOTE
Wt Readings from Last 3 Encounters:   01/25/22 97 1 kg (214 lb 1 1 oz)   01/18/22 91 7 kg (202 lb 2 6 oz)   01/18/22 91 6 kg (201 lb 14 4 oz)     -symptoms currently improved following initiation of HD  -continue current medical therapy

## 2022-01-25 NOTE — ASSESSMENT & PLAN NOTE
· Presented for shortness of breath while receiving fluids at infusion center due to nausea from chemotherapy  · He is on 4 L of oxygen chronically  · He was placed on CPAP by EMS prior to arrival for increased work of breathing  · EKG:  Paced rhythm  · BNP: 1067  · Chest x-ray: Persistent or recurrent pulmonary vascular congestion persistent cardiomegaly   · He received 40 mg of IV Lasix followed by Flomax 2 mg IV x 2-ultimately required initiation of dialysis  · Daily weights and I&Os  · Cardiology recommendations appreciated- continue current regimen

## 2022-01-25 NOTE — PROGRESS NOTES
[address]  Progress Note Myles Farley 1955, 74 y o  male MRN: [id number]  Unit/Bed#: -Ame Encounter: [id number]  Primary Care Provider: Mikayla Garcia DO   Date and time admitted to hospital: 1/21/2022  1:36 PM    * Acute on chronic diastolic congestive heart failure ([facility] )  Assessment & Plan  · Presented for shortness of breath while receiving fluids at infusion center due to nausea from chemotherapy  · He is on 4 L of oxygen chronically  · He was placed on CPAP by EMS prior to arrival for increased work of breathing  · EKG:  Paced rhythm  · BNP: 1067  · Chest x-ray: Persistent or recurrent pulmonary vascular congestion persistent cardiomegaly   · He received 40 mg of IV Lasix followed by Flomax 2 mg IV x 2-ultimately required initiation of dialysis  · Daily weights and I&Os  · Cardiology recommendations appreciated- continue current regimen    Acute kidney injury superimposed on chronic kidney disease [facility])  Assessment & Plan  Lab Results   Component Value Date    EGFR 16 01/25/2022    EGFR 11 01/24/2022    EGFR 8 01/23/2022    CREATININE 3 60 (H) 01/25/2022    CREATININE 4 87 (H) 01/24/2022    CREATININE 6 14 (H) 01/23/2022     · History of CKD, recently started chemotherapy for colon cancer with significant worsening of kidney function with hospitalization for creatinine around 0 7  Baseline creatinine level is around 3  · ER PA consulted nephrology who recommended Lasix 80 mg and nephrology consultation  · I&O, daily weights  · Urinary retention protocol  · Nephrology recommendations appreciated- dialysis was initiated due to volume overload and electrolyte abnormalities  Will reevaluate tomorrow after HD regarding whether HD will be permanent  Hyperkalemia  Assessment & Plan  · Potasium 6 0 >>4 8>> 3 5 after dialysis, now 3 2    · HD planned for tomorrow  Nephrology plans to re-evaluate tomorrow whether for not hemodialysis will be permanent    · Recheck metabolic panel and trend potassium      Hyponatremia  Assessment & Plan  · Sodium 127>> 132 after dialysis, now 129  · Recheck metabolic panel     PAF (paroxysmal atrial fibrillation) ([facility] )  Assessment & Plan  · Cardiology consult appreciated  -currently appears to be paced on telemetry with underlying atrial flutter heart rate 60 on telemetry  -continue current anticoagulation with Eliquis 5 mg twice daily  -continue metoprolol tartrate 50 mg twice daily    Colon cancer ([facility] )  Assessment & Plan  · Adenocarcinoma 01/08/2021 currently being treated with chemotherapy at [facility] center  · Continue outpatient follow-up after resolution of acute issue    SSS (sick sinus syndrome) ([facility] )  Assessment & Plan  · s/p Medtronic dual-chamber permanent pacemaker  · Continue to monitor      Hypertension  Assessment & Plan  · Reasonably well controlled  · Continue pre-admission amlodipine and metoprolol      Type 2 diabetes mellitus with chronic kidney disease, with long-term current use of insulin [facility])  Assessment & Plan  Lab Results   Component Value Date    HGBA1C 5 7 (H) 12/14/2021       Recent Labs     01/24/22 2012 01/25/22  0553 01/25/22  1138 01/25/22  1609   POCGLU 178* 94 259* 218*       Blood Sugar Average: Last 72 hrs:  (P) 402 [id number]     · Diabetic diet  · Fingerstick blood sugar with sliding scale coverage  · Hold home insulin Xultophy    NICOLASA (obstructive sleep apnea)  Assessment & Plan  · CPAP at bedtime    Uremic acidosis  Assessment & Plan  · AG 22, CO2 14, - improved after dialysis  · Nephrology following  May need permanent dialysis  Will be re-evaluating tomorrow    · Serial labs    Diabetic ulcer of left foot [facility])  Assessment & Plan  Lab Results   Component Value Date    HGBA1C 5 7 (H) 12/14/2021       Recent Labs     01/23/22  1041 01/23/22  1615 01/23/22  2203 01/24/22  0700   POCGLU 242* 190* 251* 158*       Blood Sugar Average: Last 72 hrs:  (P) 929 [id number]     · Follows up with [address] for 20 years-with skin graft for diabetic foot ulcer  · Wound care consult placed- patient's wife requests Podiatry consultation (pending)        VTE Pharmacologic Prophylaxis:   Eliquis    Patient Centered Rounds: I performed bedside rounds with nursing staff today  Discussions with Specialists or Other Care Team Provider:  Case Management, nursing, Nephrology    Education and Discussions with Family / Patient: Discussed plan of care with the patient bedside  All questions/concerns addressed satisfaction        Time Spent for Care: 30 minutes  More than 50% of total time spent on counseling and coordination of care as described above  Current Length of Stay: 4 day(s)  Current Patient Status: Inpatient   Certification Statement: The patient will continue to require additional inpatient hospital stay due to Electrolyte abnormalities and hemodialysis treatment/discharge planning  Discharge Plan: Anticipate discharge tomorrow to home  Code Status: Level 3 - DNAR and DNI    Subjective:   Patient was seen and examined resting comfortably in bed, nursing present  In apparent distress  No acute events overnight  Patient reports left upper arm swelling that began today  Denies pain  Objective:     Vitals:   Temp (24hrs), Av 9 °F (36 6 °C), Min:97 6 °F (36 4 °C), Max:98 2 °F (36 8 °C)    Temp:  [97 6 °F (36 4 °C)-98 2 °F (36 8 °C)] 97 8 °F (36 6 °C)  HR:  [60-66] 60  Resp:  [18-19] 19  BP: (133-146)/(61-68) 136/61  SpO2:  [88 %-96 %] 90 %  Body mass index is 30 72 kg/m²  Input and Output Summary (last 24 hours): Intake/Output Summary (Last 24 hours) at 20229  Last data filed at 2022 1756  Gross per 24 hour   Intake 240 ml   Output 800 ml   Net -560 ml       Physical Exam:   Physical Exam  Vitals and nursing note reviewed  Constitutional:       General: He is not in acute distress  Appearance: He is obese  He is not toxic-appearing     HENT:      Head: Normocephalic and atraumatic  Mouth/Throat:      Mouth: Mucous membranes are moist    Eyes:      Conjunctiva/sclera: Conjunctivae normal    Neck:      Comments: R IJ temporary HD catheter in place  Cardiovascular:      Rate and Rhythm: Normal rate and regular rhythm  Pulses: Normal pulses  Heart sounds: Murmur heard  Pulmonary:      Effort: Pulmonary effort is normal  No respiratory distress  Breath sounds: No wheezing, rhonchi or rales  Comments: Decreased breath sounds throughout  Abdominal:      General: Bowel sounds are normal  There is no distension  Palpations: Abdomen is soft  Tenderness: There is no abdominal tenderness  Musculoskeletal:      Left upper arm: Swelling (mild, diffuse swelling of proximal portion of upper extremity) present  Cervical back: Neck supple  Right lower leg: No edema  Left lower leg: No edema  Skin:     General: Skin is warm and dry  Comments: RLE dressing in place, C/D/I   Neurological:      Mental Status: He is alert and oriented to person, place, and time  Cranial Nerves: No cranial nerve deficit  Sensory: No sensory deficit  Motor: No weakness  Coordination: Coordination normal    Psychiatric:         Mood and Affect: Mood normal          Behavior: Behavior normal          Thought Content:  Thought content normal         Additional Data:     Labs:  Results from last 7 days   Lab Units 01/25/22  0605   WBC Thousand/uL 7 03   HEMOGLOBIN g/dL 10 0*   HEMATOCRIT % 29 5*   PLATELETS Thousands/uL 168   NEUTROS PCT % 67   LYMPHS PCT % 17   MONOS PCT % 11   EOS PCT % 4     Results from last 7 days   Lab Units 01/25/22  0605 01/22/22  1441 01/22/22  0646   SODIUM mmol/L 129*   < > 127*   POTASSIUM mmol/L 3 2*   < > 5 6*   CHLORIDE mmol/L 94*   < > 95*   CO2 mmol/L 31   < > 17*   BUN mg/dL 50*   < > 112*   CREATININE mg/dL 3 60*   < > 5 94*   ANION GAP mmol/L 4   < > 15*   CALCIUM mg/dL 8 1*   < > 9 3 ALBUMIN g/dL  --   --  3 9   TOTAL BILIRUBIN mg/dL  --   --  1 10*   ALK PHOS U/L  --   --  48   ALT U/L  --   --  163*   AST U/L  --   --  148*   GLUCOSE RANDOM mg/dL 95   < > 166*    < > = values in this interval not displayed       Results from last 7 days   Lab Units 01/21/22  1359   INR  1 77*     Results from last 7 days   Lab Units 01/25/22  1609 01/25/22  1138 01/25/22  0553 01/24/22 2012 01/24/22  0700 01/23/22  2203 01/23/22  1615 01/23/22  1041 01/23/22  0701 01/22/22  2343 01/22/22  1653 01/22/22  1101   POC GLUCOSE mg/dl 218* 259* 94 178* 158* 251* 190* 242* 169* 151* 167* 144*         Results from last 7 days   Lab Units 01/22/22  1441   LACTIC ACID mmol/L 1 2       Lines/Drains:  Invasive Devices  Report    Central Venous Catheter Line            Port A Cath Right Subclavian -- days          Hemodialysis Catheter            HD Temporary Double Catheter 2 days                Central Line:  Goal for removal: Hemodialysis             Imaging: Reviewed radiology reports from this admission including: chest xray    Recent Cultures (last 7 days):         Last 24 Hours Medication List:   Current Facility-Administered Medications   Medication Dose Route Frequency Provider Last Rate    amLODIPine  10 mg Oral Daily PAT Garcia      apixaban  5 mg Oral Q12H PAT Landa      calcium acetate  667 mg Oral Daily With [employer], PAT      diphenoxylate-atropine  1 tablet Oral 4x Daily PRN Shreyas Couch PA-C      famotidine  20 mg Oral Daily Thresea PAT Moser      fish oil  1,000 mg Oral BID PAT Landa      insulin lispro  1-6 Units Subcutaneous TID AC PAT Landa      insulin lispro  1-6 Units Subcutaneous HS PAT Landa      metoprolol tartrate  50 mg Oral Q12H ThresePAT Bell      ondansetron  4 mg Intravenous Q4H PRN PAT Jordan      potassium chloride  20 mEq Oral Q4H PAT Garcia      prochlorperazine  5 mg Oral Q6H PRN Matthew Vasquez PA-C      saccharomyces boulardii  250 mg Oral BID [facility]      tamsulosin  0 4 mg Oral Daily With [facility], PAT          Today, Patient Was Seen By: Sheela Bonilla PA-C    **Please Note: This note may have been constructed using a voice recognition system  **

## 2022-01-25 NOTE — PROGRESS NOTES
[address]  Progress Note Peggy Gutiérrez 1955, 74 y o  male MRN: [id number]  Unit/Bed#: -01 Encounter: [id number]  Primary Care Provider: Neno Alberto DO   Date and time admitted to hospital: 1/21/2022  1:36 PM    Acute kidney injury superimposed on chronic kidney disease [facility])  Assessment & Plan  Lab Results   Component Value Date    EGFR 16 01/25/2022    EGFR 11 01/24/2022    EGFR 8 01/23/2022    CREATININE 3 60 (H) 01/25/2022    CREATININE 4 87 (H) 01/24/2022    CREATININE 6 14 (H) 01/23/2022   -currently being followed by Nephrology  -volume management per Nephrology      SSS (sick sinus syndrome) ([facility] )  Assessment & Plan  -s/p Medtronic dual-chamber permanent pacemaker      Hypertension  Assessment & Plan  -blood pressures appear reasonably well controlled  -continue current medical therapy with amlodipine and metoprolol      PAF (paroxysmal atrial fibrillation) ([facility] )  Assessment & Plan  -currently appears to be paced on telemetry with underlying atrial flutter heart rate 60 on telemetry  -continue current anticoagulation with Eliquis 5 mg twice daily  -continue metoprolol tartrate 50 mg twice daily      * Acute on chronic diastolic congestive heart failure (HCC)  Assessment & Plan  Wt Readings from Last 3 Encounters:   01/25/22 97 1 kg (214 lb 1 1 oz)   01/18/22 91 7 kg (202 lb 2 6 oz)   01/18/22 91 6 kg (201 lb 14 4 oz)     -symptoms currently improved following initiation of HD  -continue current medical therapy        Outpatient Cardiologist: Dr Rosa Bradford:   Patient seen and examined  No significant events overnight  Pt reports feeling better-his breathing is at baseline  Summary comments:  Admitted with SOB from infusion center after receiving IVF for nausea and decreased PO intake  He was placed on CPAP by EMS with improvement  In the ER he was given IV lasix  Pt has colon cancer and is currently undergoing chemo treatments    He has CKD and has been started on HD this admission  Cardiac problems include SSS s/p Medtronic PPM, valvular disease (mod MS, MR, AI), Afib  He has O2 dependent COPD, DM, and prior foot amp secondary to osteomyelitis  Cardiology was consulted for acute decompensated HFpEF  Renal is following and hemodialysis has been initiated this admission  Continue current treatment plan including HD for volume management  Telemetry/ECG/Cardiac testing:   Device interrogation 12/8/2021 normal functioning    Echo 5/24/2021 EF 60%, mod MS, mild-mod MR, mild TR, mild-mod AS, AI    Vitals: Blood pressure 142/68, pulse 63, temperature 98 2 °F (36 8 °C), resp  rate 18, height 5' 10" (1 778 m), weight 97 1 kg (214 lb 1 1 oz), SpO2 (!) 89 % ,   Orthostatic Blood Pressures      Most Recent Value   Blood Pressure 142/68 filed at 01/25/2022 0759   Patient Position - Orthostatic VS Lying filed at 01/24/2022 1534      ,   Weight (last 2 days)     Date/Time Weight    01/25/22 0555 97 1 (214 07)    01/24/22 0600 93 7 (206 57)    01/24/22 0527 93 7 (206 57)          Physical Exam:    General:  Normal appearance in no distress  Eyes:  Anicteric  Oral mucosa:  Moist   Neck:  No JVD  R IJ temporary dialysis catheter  Chest:  Clear to auscultation and percussion  Cardiac:  Irregularly irregular  No palpable PMI  Normal S1 and S2  Systolic murmur noted  Abdomen:  Soft and nontender  No palpable organomegaly or aortic enlargement  Extremities:  Trace bilat LE edema  Neuro:  Grossly symmetric  Psych:  Alert and oriented x3        Medications:      Current Facility-Administered Medications:     amLODIPine (NORVASC) tablet 10 mg, 10 mg, Oral, Daily, Teddy Glaze, CRNP, 10 mg at 01/25/22 1001    apixaban (ELIQUIS) tablet 5 mg, 5 mg, Oral, Q12H, Jamison Krabbe, CRNP, 5 mg at 01/25/22 0609    calcium acetate (PHOSLO) capsule 667 mg, 667 mg, Oral, Daily With ODALIS MakNP, 667 mg at 01/24/22 1611    diphenoxylate-atropine (LOMOTIL) 2 5-0 025 mg per tablet 1 tablet, 1 tablet, Oral, 4x Daily PRN, Yanira Thomas PA-C, 1 tablet at 01/24/22 2033    famotidine (PEPCID) tablet 20 mg, 20 mg, Oral, Daily, PAT Morris, 20 mg at 01/25/22 1001    fish oil capsule 1,000 mg, 1,000 mg, Oral, BID, PAT Morris, 1,000 mg at 01/25/22 1001    insulin lispro (HumaLOG) 100 units/mL subcutaneous injection 1-6 Units, 1-6 Units, Subcutaneous, TID AC, 1 Units at 01/24/22 1611 **AND** Fingerstick Glucose (POCT), , , TID AC, PAT Morris    insulin lispro (HumaLOG) 100 units/mL subcutaneous injection 1-6 Units, 1-6 Units, Subcutaneous, HS, PAT Morris, 1 Units at 01/24/22 2300    metoprolol tartrate (LOPRESSOR) tablet 50 mg, 50 mg, Oral, Q12H, PAT Morris, 50 mg at 01/25/22 0609    ondansetron (ZOFRAN) injection 4 mg, 4 mg, Intravenous, Q4H PRN, OscarPAT Hendrickson, 4 mg at 01/24/22 0557    potassium chloride (K-DUR,KLOR-CON) CR tablet 20 mEq, 20 mEq, Oral, Q4H, PAT Alaniz    prochlorperazine (COMPAZINE) tablet 5 mg, 5 mg, Oral, Q6H PRN, Angélica Kellogg PA-C    saccharomyces boulardii (FLORASTOR) capsule 250 mg, 250 mg, Oral, BID, Kristie Patten PA-C, 250 mg at 01/25/22 1001    tamsulosin (FLOMAX) capsule 0 4 mg, 0 4 mg, Oral, Daily With Dinner, PAT Morris, 0 4 mg at 01/24/22 1611     Labs & Results:    Troponins:    Results from last 7 days   Lab Units 01/21/22  1841 01/21/22  1655 01/21/22  1359   HS TNI 0HR ng/L  --   --  53   HS TNI 2HR ng/L  --  60  --    HSTNI D2 ng/L  --  7  --    HS TNI 4HR ng/L 64  --   --    HSTNI D4 ng/L 11  --   --         BNP:   Results from last 6 Months   Lab Units 01/21/22  1359   BNP pg/mL 1,067*     CBC with diff:   Results from last 7 days   Lab Units 01/25/22  0605 01/24/22  0506   WBC Thousand/uL 7 03 8 34   HEMOGLOBIN g/dL 10 0* 9 6*   HEMATOCRIT % 29 5* 27 8*   MCV fL 92 87   PLATELETS Thousands/uL 168 179     TSH:     CMP: Results from last 7 days   Lab Units 01/25/22  0605 01/24/22  0506 01/22/22  1441 01/22/22  0646 01/21/22  1359 01/21/22  1359   POTASSIUM mmol/L 3 2* 3 5   < > 5 6*   < > 5 4*   CHLORIDE mmol/L 94* 92*   < > 95*   < > 97   CO2 mmol/L 31 26   < > 17*   < > 14*   BUN mg/dL 50* 93*   < > 112*   < > 98*   CREATININE mg/dL 3 60* 4 87*   < > 5 94*   < > 5 53*   AST U/L  --   --   --  148*  --  37   ALT U/L  --   --   --  163*  --  37   EGFR ml/min/1 73sq m 16 11   < > 8   < > 9    < > = values in this interval not displayed       Lipid Profile:     Coags:   Results from last 7 days   Lab Units 01/21/22  1359   INR  1 77*

## 2022-01-25 NOTE — DISCHARGE INSTR - OTHER ORDERS
Skin Care Plan:   1  Apply skin nourishing cream to the skin daily  2  Elevate heels off of bed with pillows to offload  3  Ehob offloading cushion to chair when OOB  4   Apply hydraguard to bilateral heels TID

## 2022-01-25 NOTE — ASSESSMENT & PLAN NOTE
· Potasium 6 0 >>4 8>> 3 5 after dialysis, now 3 2    · HD planned for tomorrow  Nephrology plans to re-evaluate tomorrow whether for not hemodialysis will be permanent    · Recheck metabolic panel and trend potassium

## 2022-01-26 ENCOUNTER — APPOINTMENT (INPATIENT)
Dept: NON INVASIVE DIAGNOSTICS | Facility: HOSPITAL | Age: 67
DRG: 682 | End: 2022-01-26
Payer: MEDICARE

## 2022-01-26 LAB
ANION GAP SERPL CALCULATED.3IONS-SCNC: 11 MMOL/L (ref 4–13)
BUN SERPL-MCNC: 54 MG/DL (ref 5–25)
CALCIUM SERPL-MCNC: 8.5 MG/DL (ref 8.4–10.2)
CHLORIDE SERPL-SCNC: 93 MMOL/L (ref 96–108)
CO2 SERPL-SCNC: 27 MMOL/L (ref 21–32)
CREAT SERPL-MCNC: 3.66 MG/DL (ref 0.6–1.3)
ERYTHROCYTE [DISTWIDTH] IN BLOOD BY AUTOMATED COUNT: 15.7 % (ref 11.6–15.1)
GFR SERPL CREATININE-BSD FRML MDRD: 16 ML/MIN/1.73SQ M
GLUCOSE SERPL-MCNC: 130 MG/DL (ref 65–140)
GLUCOSE SERPL-MCNC: 133 MG/DL (ref 65–140)
GLUCOSE SERPL-MCNC: 210 MG/DL (ref 65–140)
GLUCOSE SERPL-MCNC: 218 MG/DL (ref 65–140)
GLUCOSE SERPL-MCNC: 236 MG/DL (ref 65–140)
HCT VFR BLD AUTO: 32.6 % (ref 36.5–49.3)
HGB BLD-MCNC: 11.1 G/DL (ref 12–17)
MAGNESIUM SERPL-MCNC: 2.1 MG/DL (ref 1.9–2.7)
MCH RBC QN AUTO: 30.8 PG (ref 26.8–34.3)
MCHC RBC AUTO-ENTMCNC: 34 G/DL (ref 31.4–37.4)
MCV RBC AUTO: 91 FL (ref 82–98)
PHOSPHATE SERPL-MCNC: 3.2 MG/DL (ref 2.3–4.1)
PLATELET # BLD AUTO: 187 THOUSANDS/UL (ref 149–390)
PMV BLD AUTO: 10.5 FL (ref 8.9–12.7)
POTASSIUM SERPL-SCNC: 3.3 MMOL/L (ref 3.5–5.3)
RBC # BLD AUTO: 3.6 MILLION/UL (ref 3.88–5.62)
SODIUM SERPL-SCNC: 131 MMOL/L (ref 135–147)
WBC # BLD AUTO: 10.96 THOUSAND/UL (ref 4.31–10.16)

## 2022-01-26 PROCEDURE — 94760 N-INVAS EAR/PLS OXIMETRY 1: CPT

## 2022-01-26 PROCEDURE — 99232 SBSQ HOSP IP/OBS MODERATE 35: CPT | Performed by: NURSE PRACTITIONER

## 2022-01-26 PROCEDURE — 83735 ASSAY OF MAGNESIUM: CPT | Performed by: NURSE PRACTITIONER

## 2022-01-26 PROCEDURE — 99232 SBSQ HOSP IP/OBS MODERATE 35: CPT | Performed by: PHYSICIAN ASSISTANT

## 2022-01-26 PROCEDURE — 99232 SBSQ HOSP IP/OBS MODERATE 35: CPT

## 2022-01-26 PROCEDURE — 82948 REAGENT STRIP/BLOOD GLUCOSE: CPT

## 2022-01-26 PROCEDURE — 93971 EXTREMITY STUDY: CPT

## 2022-01-26 PROCEDURE — 80048 BASIC METABOLIC PNL TOTAL CA: CPT | Performed by: NURSE PRACTITIONER

## 2022-01-26 PROCEDURE — 84100 ASSAY OF PHOSPHORUS: CPT | Performed by: NURSE PRACTITIONER

## 2022-01-26 PROCEDURE — 85027 COMPLETE CBC AUTOMATED: CPT

## 2022-01-26 PROCEDURE — 93971 EXTREMITY STUDY: CPT | Performed by: SURGERY

## 2022-01-26 PROCEDURE — 94660 CPAP INITIATION&MGMT: CPT

## 2022-01-26 RX ORDER — POTASSIUM CHLORIDE 20 MEQ/1
20 TABLET, EXTENDED RELEASE ORAL ONCE
Status: COMPLETED | OUTPATIENT
Start: 2022-01-26 | End: 2022-01-26

## 2022-01-26 RX ADMIN — INSULIN LISPRO 2 UNITS: 100 INJECTION, SOLUTION INTRAVENOUS; SUBCUTANEOUS at 21:19

## 2022-01-26 RX ADMIN — Medication 250 MG: at 17:11

## 2022-01-26 RX ADMIN — OMEGA-3 FATTY ACIDS CAP 1000 MG 1000 MG: 1000 CAP at 17:10

## 2022-01-26 RX ADMIN — INSULIN LISPRO 2 UNITS: 100 INJECTION, SOLUTION INTRAVENOUS; SUBCUTANEOUS at 17:12

## 2022-01-26 RX ADMIN — APIXABAN 5 MG: 5 TABLET, FILM COATED ORAL at 05:18

## 2022-01-26 RX ADMIN — AMLODIPINE BESYLATE 10 MG: 10 TABLET ORAL at 09:25

## 2022-01-26 RX ADMIN — CALCIUM ACETATE 667 MG: 667 CAPSULE ORAL at 17:11

## 2022-01-26 RX ADMIN — Medication 250 MG: at 09:25

## 2022-01-26 RX ADMIN — INSULIN LISPRO 3 UNITS: 100 INJECTION, SOLUTION INTRAVENOUS; SUBCUTANEOUS at 12:48

## 2022-01-26 RX ADMIN — APIXABAN 5 MG: 5 TABLET, FILM COATED ORAL at 17:10

## 2022-01-26 RX ADMIN — POTASSIUM CHLORIDE 20 MEQ: 1500 TABLET, EXTENDED RELEASE ORAL at 12:47

## 2022-01-26 RX ADMIN — METOPROLOL TARTRATE 50 MG: 50 TABLET, FILM COATED ORAL at 05:18

## 2022-01-26 RX ADMIN — OMEGA-3 FATTY ACIDS CAP 1000 MG 1000 MG: 1000 CAP at 09:25

## 2022-01-26 RX ADMIN — TAMSULOSIN HYDROCHLORIDE 0.4 MG: 0.4 CAPSULE ORAL at 17:10

## 2022-01-26 RX ADMIN — FAMOTIDINE 20 MG: 20 TABLET ORAL at 09:25

## 2022-01-26 RX ADMIN — METOPROLOL TARTRATE 50 MG: 50 TABLET, FILM COATED ORAL at 17:11

## 2022-01-26 NOTE — PLAN OF CARE
Problem: Nutrition/Hydration-ADULT  Goal: Nutrient/Hydration intake appropriate for improving, restoring or maintaining nutritional needs  Description: Monitor and assess patient's nutrition/hydration status for malnutrition  Collaborate with interdisciplinary team and initiate plan and interventions as ordered  Monitor patient's weight and dietary intake as ordered or per policy  Utilize nutrition screening tool and intervene as necessary  Determine patient's food preferences and provide high-protein, high-caloric foods as appropriate       INTERVENTIONS:  - Monitor oral intake, urinary output, labs, and treatment plans  - Assess nutrition and hydration status and recommend course of action  - Evaluate amount of meals eaten  - Assist patient with eating if necessary   - Allow adequate time for meals  - Recommend/ encourage appropriate diets, oral nutritional supplements, and vitamin/mineral supplements  - Order, calculate, and assess calorie counts as needed  - Recommend, monitor, and adjust tube feedings and TPN/PPN based on assessed needs  - Assess need for intravenous fluids  - Provide specific nutrition/hydration education as appropriate  - Include patient/family/caregiver in decisions related to nutrition  Outcome: Progressing     Problem: RESPIRATORY - ADULT  Goal: Achieves optimal ventilation and oxygenation  Description: INTERVENTIONS:  - Assess for changes in respiratory status  - Assess for changes in mentation and behavior  - Position to facilitate oxygenation and minimize respiratory effort  - Oxygen administered by appropriate delivery if ordered  - Initiate smoking cessation education as indicated  - Encourage broncho-pulmonary hygiene including cough, deep breathe, Incentive Spirometry  - Assess the need for suctioning and aspirate as needed  - Assess and instruct to report SOB or any respiratory difficulty  - Respiratory Therapy support as indicated  Outcome: Progressing     Problem: GASTROINTESTINAL - ADULT  Goal: Minimal or absence of nausea and/or vomiting  Description: INTERVENTIONS:  - Administer IV fluids if ordered to ensure adequate hydration  - Maintain NPO status until nausea and vomiting are resolved  - Nasogastric tube if ordered  - Administer ordered antiemetic medications as needed  - Provide nonpharmacologic comfort measures as appropriate  - Advance diet as tolerated, if ordered  - Consider nutrition services referral to assist patient with adequate nutrition and appropriate food choices  Outcome: Progressing  Goal: Maintains or returns to baseline bowel function  Description: INTERVENTIONS:  - Assess bowel function  - Encourage oral fluids to ensure adequate hydration  - Administer IV fluids if ordered to ensure adequate hydration  - Administer ordered medications as needed  - Encourage mobilization and activity  - Consider nutritional services referral to assist patient with adequate nutrition and appropriate food choices  Outcome: Progressing  Goal: Maintains adequate nutritional intake  Description: INTERVENTIONS:  - Monitor percentage of each meal consumed  - Identify factors contributing to decreased intake, treat as appropriate  - Assist with meals as needed  - Monitor I&O, weight, and lab values if indicated  - Obtain nutrition services referral as needed  Outcome: Progressing  Goal: Oral mucous membranes remain intact  Description: INTERVENTIONS  - Assess oral mucosa and hygiene practices  - Implement preventative oral hygiene regimen  - Implement oral medicated treatments as ordered  - Initiate Nutrition services referral as needed  Outcome: Progressing     Problem: PAIN - ADULT  Goal: Verbalizes/displays adequate comfort level or baseline comfort level  Description: Interventions:  - Encourage patient to monitor pain and request assistance  - Assess pain using appropriate pain scale  - Administer analgesics based on type and severity of pain and evaluate response  - Implement non-pharmacological measures as appropriate and evaluate response  - Consider cultural and social influences on pain and pain management  - Notify physician/advanced practitioner if interventions unsuccessful or patient reports new pain  Outcome: Progressing     Problem: INFECTION - ADULT  Goal: Absence or prevention of progression during hospitalization  Description: INTERVENTIONS:  - Assess and monitor for signs and symptoms of infection  - Monitor lab/diagnostic results  - Monitor all insertion sites, i e  indwelling lines, tubes, and drains  - Monitor endotracheal if appropriate and nasal secretions for changes in amount and color  - [place] appropriate cooling/warming therapies per order  - Administer medications as ordered  - Instruct and encourage patient and family to use good hand hygiene technique  - Identify and instruct in appropriate isolation precautions for identified infection/condition  Outcome: Progressing     Problem: SAFETY ADULT  Goal: Patient will remain free of falls  Description: INTERVENTIONS:  - Educate patient/family on patient safety including physical limitations  - Instruct patient to call for assistance with activity   - Consult OT/PT to assist with strengthening/mobility   - Keep Call bell within reach  - Keep bed low and locked with side rails adjusted as appropriate  - Keep care items and personal belongings within reach  - Initiate and maintain comfort rounds  - Make Fall Risk Sign visible to staff  - Offer Toileting every 2 Hours, in advance of need  - Apply yellow socks and bracelet for high fall risk patients  - Consider moving patient to room near nurses station  Outcome: Progressing  Goal: Maintain or return to baseline ADL function  Description: INTERVENTIONS:  -  Assess patient's ability to carry out ADLs; assess patient's baseline for ADL function and identify physical deficits which impact ability to perform ADLs (bathing, care of mouth/teeth, toileting, grooming, dressing, etc )  - Assess/evaluate cause of self-care deficits   - Assess range of motion  - Assess patient's mobility; develop plan if impaired  - Assess patient's need for assistive devices and provide as appropriate  - Encourage maximum independence but intervene and supervise when necessary  - Involve family in performance of ADLs  - Assess for home care needs following discharge   - Consider OT consult to assist with ADL evaluation and planning for discharge  - Provide patient education as appropriate  Outcome: Progressing  Goal: Maintains/Returns to pre admission functional level  Description: INTERVENTIONS:  - Perform BMAT or MOVE assessment daily    - Set and communicate daily mobility goal to care team and patient/family/caregiver  - Collaborate with rehabilitation services on mobility goals if consulted  - Out of bed for toileting  - Record patient progress and toleration of activity level   Outcome: Progressing     Problem: DISCHARGE PLANNING  Goal: Discharge to home or other facility with appropriate resources  Description: INTERVENTIONS:  - Identify barriers to discharge w/patient and caregiver  - Arrange for needed discharge resources and transportation as appropriate  - Identify discharge learning needs (meds, wound care, etc )  - Arrange for interpretive services to assist at discharge as needed  - Refer to Case Management Department for coordinating discharge planning if the patient needs post-hospital services based on physician/advanced practitioner order or complex needs related to functional status, cognitive ability, or social support system  Outcome: Progressing     Problem: Knowledge Deficit  Goal: Patient/family/caregiver demonstrates understanding of disease process, treatment plan, medications, and discharge instructions  Description: Complete learning assessment and assess knowledge base    Interventions:  - Provide teaching at level of understanding  - Provide teaching via preferred learning methods  Outcome: Progressing     Problem: Prexisting or High Potential for Compromised Skin Integrity  Goal: Skin integrity is maintained or improved  Description: INTERVENTIONS:  - Identify patients at risk for skin breakdown  - Assess and monitor skin integrity  - Assess and monitor nutrition and hydration status  - Monitor labs   - Assess for incontinence   - Turn and reposition patient  - Assist with mobility/ambulation  - Relieve pressure over bony prominences  - Avoid friction and shearing  - Provide appropriate hygiene as needed including keeping skin clean and dry  - Evaluate need for skin moisturizer/barrier cream  - Collaborate with interdisciplinary team   - Patient/family teaching  - Consider wound care consult   Outcome: Progressing     Problem: MOBILITY - ADULT  Goal: Maintain or return to baseline ADL function  Description: INTERVENTIONS:  -  Assess patient's ability to carry out ADLs; assess patient's baseline for ADL function and identify physical deficits which impact ability to perform ADLs (bathing, care of mouth/teeth, toileting, grooming, dressing, etc )  - Assess/evaluate cause of self-care deficits   - Assess range of motion  - Assess patient's mobility; develop plan if impaired  - Assess patient's need for assistive devices and provide as appropriate  - Encourage maximum independence but intervene and supervise when necessary  - Involve family in performance of ADLs  - Assess for home care needs following discharge   - Consider OT consult to assist with ADL evaluation and planning for discharge  - Provide patient education as appropriate  Outcome: Progressing  Goal: Maintains/Returns to pre admission functional level  Description: INTERVENTIONS:  - Perform BMAT or MOVE assessment daily    - Set and communicate daily mobility goal to care team and patient/family/caregiver     - Collaborate with rehabilitation services on mobility goals if consulted  - Out of bed for toileting  - Record patient progress and toleration of activity level   Outcome: Progressing     Problem: Potential for Falls  Goal: Patient will remain free of falls  Description: INTERVENTIONS:  - Educate patient/family on patient safety including physical limitations  - Instruct patient to call for assistance with activity   - Consult OT/PT to assist with strengthening/mobility   - Keep Call bell within reach  - Keep bed low and locked with side rails adjusted as appropriate  - Keep care items and personal belongings within reach  - Initiate and maintain comfort rounds  - Make Fall Risk Sign visible to staff  - Offer Toileting every 2 Hours, in advance of need  - Apply yellow socks and bracelet for high fall risk patients  - Consider moving patient to room near nurses station  Outcome: Progressing     Problem: METABOLIC, FLUID AND ELECTROLYTES - ADULT  Goal: Electrolytes maintained within normal limits  Description: INTERVENTIONS:  - Monitor labs and assess patient for signs and symptoms of electrolyte imbalances  - Administer electrolyte replacement as ordered  - Monitor response to electrolyte replacements, including repeat lab results as appropriate  - Instruct patient on fluid and nutrition as appropriate  Outcome: Progressing  Goal: Fluid balance maintained  Description: INTERVENTIONS:  - Monitor labs   - Monitor I/O and WT  - Instruct patient on fluid and nutrition as appropriate  - Assess for signs & symptoms of volume excess or deficit  Outcome: Progressing

## 2022-01-26 NOTE — CASE MANAGEMENT
Case Management Discharge Planning Note    Patient name Deng Ya  Location [address] 225/-57 MRN [id number]  : 1955 Date 2022       Current Admission Date: 2022  Current Admission Diagnosis:Acute on chronic diastolic congestive heart failure [facility])   Patient Active Problem List    Diagnosis Date Noted    Acute on chronic diastolic congestive heart failure ([facility] ) 2022    Nausea 2022    Chemotherapy-induced neutropenia ([facility] ) 2022    Acute kidney injury superimposed on chronic kidney disease ([facility] ) 2021    RSV (respiratory syncytial virus pneumonia) 2021    Colon cancer ([facility] ) 10/23/2021    Absence of toe ([facility] ) 2021    Chronic obstructive pulmonary disease ([facility] ) 2020    SSS (sick sinus syndrome) ([facility] ) 2020    Onychomycosis 2020    Chronic painful diabetic neuropathy ([facility] ) 2020    Renovascular hypertension     Multiple drug resistant organism (MDRO) culture positive 2020    Amputation of left great toe ([facility] ) 2020    Osteomyelitis of left foot ([facility] ) 2020    Hyponatremia 2020    Iron deficiency anemia 2020    Anxiety 2020    Staphylococcus aureus bacteremia 2020    Elevated troponin I level 2020    Type 2 diabetes mellitus with diabetic neuropathy, without long-term current use of insulin ([facility] ) 2020    S/P amputation of lesser toe, left ([facility] ) 2019    S/P amputation of lesser toe, right ([facility] ) 2019    Left renal artery stenosis (HCC) 2019    Chronic diastolic (congestive) heart failure (HCC) 2019    Acute pulmonary edema (HCC)     Hyperkalemia 2019    Abnormal CT of the chest 05/15/2019    Mitral valve stenosis     Anemia 2019    Fever 2019    Nonrheumatic aortic valve stenosis 2018    Type 2 diabetes mellitus with chronic kidney disease, with long-term current use of insulin ([facility] ) 2018    Hypertension 11/14/2018    Stage 4 chronic kidney disease ([facility] ) 11/14/2018    NICOLASA (obstructive sleep apnea) 11/11/2018    Urinary retention 11/06/2018    Persistent proteinuria 11/05/2018    Volume overload 11/05/2018    PAF (paroxysmal atrial fibrillation) ([facility] ) 11/04/2018    Complete heart block (HCC) 11/79/7772    Metabolic acidosis 95/60/6115    Acute kidney injury ([place] [address] ) 11/01/2018    Other hyperlipidemia 11/01/2018    Easy bruising 09/27/2017    Peripheral arterial disease ([place] [address] ) 09/06/2017    Vertigo 08/16/2017    Diabetic ulcer of left foot ([place] [address] ) 03/18/2017    Eczema 12/11/2015    PVCs (premature ventricular contractions) 46/68/4754    Systolic murmur 87/38/5958    Bilateral leg edema 07/09/2015    Erectile dysfunction of non-organic origin 04/24/2012    Gout 04/24/2012    Hyperlipidemia 04/24/2012    Uremic acidosis 04/24/2012    Arthritis 04/24/2012    Rhinitis 04/24/2012      LOS (days): 5  Geometric Mean LOS (GMLOS) (days): 4 30  Days to GMLOS:-0 8     OBJECTIVE:  Risk of Unplanned Readmission Score: 34   Bundled Patient Payment: No Current Bundle     Current admission status: Inpatient   Preferred Pharmacy:   [facility]/pharmacy #[id number], [place] - [address]  [address]  [address]  Phone: [phone] Fax: [phone]    Primary Care Provider: Sydney Ayala DO    Primary Insurance: MEDICARE  Secondary Insurance: [facility]    DISCHARGE DETAILS:                                          Other Referral/Resources/Interventions Provided:  Interventions: Dialysis,[facility]  Referral Comments: nephro will decide in 24-48hrs if pt needs permanent dialysis and then the pt will need an outpt dialysis chair, referrals were sent to [facility]    Would you like to participate in our [address] service program?  : No - Declined    Treatment Team Recommendation: Home with [address] [employer] ([facility]

## 2022-01-26 NOTE — ASSESSMENT & PLAN NOTE
· Adenocarcinoma 01/08/2021 currently being treated with chemotherapy at [facility] center  · Continue outpatient follow-up after resolution of acute issue

## 2022-01-26 NOTE — PROGRESS NOTES
[address]  Progress Note Rousseau Hua 1955, 74 y o  male MRN: [id number]  Unit/Bed#: MS Dima-01 Encounter: [id number]  Primary Care Provider: Milton Santiago DO   Date and time admitted to hospital: 1/21/2022  1:36 PM    Acute kidney injury superimposed on chronic kidney disease [facility])  Assessment & Plan  Lab Results   Component Value Date    EGFR 16 01/26/2022    EGFR 16 01/25/2022    EGFR 11 01/24/2022    CREATININE 3 66 (H) 01/26/2022    CREATININE 3 60 (H) 01/25/2022    CREATININE 4 87 (H) 01/24/2022   -currently being followed by Nephrology  -volume management per Nephrology      SSS (sick sinus syndrome) ([facility] )  Assessment & Plan  -s/p Medtronic dual-chamber permanent pacemaker      Hypertension  Assessment & Plan  -blood pressures appear reasonably well controlled  -continue current medical therapy with amlodipine and metoprolol      PAF (paroxysmal atrial fibrillation) ([facility] )  Assessment & Plan  -currently appears to be paced on telemetry with underlying atrial flutter heart rate 60 on telemetry  -continue current anticoagulation with Eliquis 5 mg twice daily  -continue metoprolol tartrate 50 mg twice daily      * Acute on chronic diastolic congestive heart failure (HCC)  Assessment & Plan  Wt Readings from Last 3 Encounters:   01/26/22 97 2 kg (214 lb 4 6 oz)   01/18/22 91 7 kg (202 lb 2 6 oz)   01/18/22 91 6 kg (201 lb 14 4 oz)     -symptoms currently improved following initiation of HD  -continue current medical therapy        Outpatient Cardiologist: Dr Bety Cohen:   Patient seen and examined  No significant events overnight  Breathing has improved    Summary comments:  Pt doing well, reports that his numbers are improving and he is not going to have a dialysis treatment today  Continue current cardiac medications  Follow up with cardiology after discharge      Telemetry/ECG/Cardiac testing:   Device interrogation 12/8/2021 normal functioning     Echo 5/24/2021 EF 60%, mod MS, mild-mod MR, mild TR, mild-mod AS, AI      Vitals: Blood pressure 152/85, pulse 61, temperature 99 2 °F (37 3 °C), resp  rate 18, height 5' 10" (1 778 m), weight 97 2 kg (214 lb 4 6 oz), SpO2 91 % ,   Orthostatic Blood Pressures      Most Recent Value   Blood Pressure 152/85 filed at 01/26/2022 0808   Patient Position - Orthostatic VS Lying filed at 01/25/2022 2205      ,   Weight (last 2 days)     Date/Time Weight    01/26/22 0557 97 2 (214 29)    01/25/22 0555 97 1 (214 07)    01/24/22 0600 93 7 (206 57)    01/24/22 0527 93 7 (206 57)          Physical Exam:    General:  Normal appearance in no distress  Eyes:  Anicteric  Oral mucosa:  Moist   Neck:  No JVD  RIJ temporary dialysis catheter   Chest:  Clear to auscultation   Cardiac:  No palpable PMI  Normal S1 and S2  Systolic murmur noted  Abdomen:  Soft and nontender  No palpable organomegaly or aortic enlargement  Extremities:  No peripheral edema  Neuro:  Grossly symmetric  Psych:  Alert and oriented x3        Medications:      Current Facility-Administered Medications:     amLODIPine (NORVASC) tablet 10 mg, 10 mg, Oral, Daily, Kulwinder Jose Angel, CRNP, 10 mg at 01/26/22 7327    apixaban (ELIQUIS) tablet 5 mg, 5 mg, Oral, Q12H, Sea Laura, CRNP, 5 mg at 01/26/22 0518    calcium acetate (PHOSLO) capsule 667 mg, 667 mg, Oral, Daily With Jake Najera, CRNP, 667 mg at 01/25/22 1638    diphenoxylate-atropine (LOMOTIL) 2 5-0 025 mg per tablet 1 tablet, 1 tablet, Oral, 4x Daily PRN, Neli Martinez PA-C, 1 tablet at 01/25/22 1649    famotidine (PEPCID) tablet 20 mg, 20 mg, Oral, Daily, Sea Laura, CRNP, 20 mg at 01/26/22 9294    fish oil capsule 1,000 mg, 1,000 mg, Oral, BID, Sea Laura, CRNP, 1,000 mg at 01/26/22 0925    insulin lispro (HumaLOG) 100 units/mL subcutaneous injection 1-6 Units, 1-6 Units, Subcutaneous, TID AC, 2 Units at 01/25/22 1639 **AND** Fingerstick Glucose (POCT), , , TID AC, Hammad Luque Lisa Manner, CRNP    insulin lispro (HumaLOG) 100 units/mL subcutaneous injection 1-6 Units, 1-6 Units, Subcutaneous, HS, Jose Manuel Child, CRNP, 2 Units at 01/25/22 2230    metoprolol tartrate (LOPRESSOR) tablet 50 mg, 50 mg, Oral, Q12H, Jose Manuel Child, CRNP, 50 mg at 01/26/22 0518    ondansetron [facility]) injection 4 mg, 4 mg, Intravenous, Q4H PRN, Kathialfonso Black, CRNP, 4 mg at 01/24/22 0557    potassium chloride (K-DUR,KLOR-CON) CR tablet 20 mEq, 20 mEq, Oral, Once, Catarina Velasquez PA-C    prochlorperazine (COMPAZINE) tablet 5 mg, 5 mg, Oral, Q6H PRN, Kailaaddy Dong PA-C    saccharomyces boulardii (FLORASTOR) capsule 250 mg, 250 mg, Oral, BID, Shalonda Patten PA-C, 250 mg at 01/26/22 4371    tamsulosin (FLOMAX) capsule 0 4 mg, 0 4 mg, Oral, Daily With Dinner, Jose Manuel Child, CRNP, 0 4 mg at 01/25/22 1644     Labs & Results:    Troponins:    Results from last 7 days   Lab Units 01/21/22  1841 01/21/22  1655 01/21/22  1359   HS TNI 0HR ng/L  --   --  53   HS TNI 2HR ng/L  --  60  --    HSTNI D2 ng/L  --  7  --    HS TNI 4HR ng/L 64  --   --    HSTNI D4 ng/L 11  --   --         BNP:   Results from last 6 Months   Lab Units 01/21/22  1359   BNP pg/mL 1,067*     CBC with diff:   Results from last 7 days   Lab Units 01/26/22  0451 01/25/22  0605   WBC Thousand/uL 10 96* 7 03   HEMOGLOBIN g/dL 11 1* 10 0*   HEMATOCRIT % 32 6* 29 5*   MCV fL 91 92   PLATELETS Thousands/uL 187 168     TSH:     CMP:   Results from last 7 days   Lab Units 01/26/22  0451 01/25/22  0605 01/22/22  1441 01/22/22  0646 01/21/22  1359 01/21/22  1359   POTASSIUM mmol/L 3 3* 3 2*   < > 5 6*   < > 5 4*   CHLORIDE mmol/L 93* 94*   < > 95*   < > 97   CO2 mmol/L 27 31   < > 17*   < > 14*   BUN mg/dL 54* 50*   < > 112*   < > 98*   CREATININE mg/dL 3 66* 3 60*   < > 5 94*   < > 5 53*   AST U/L  --   --   --  148*  --  37   ALT U/L  --   --   --  163*  --  37   EGFR ml/min/1 73sq m 16 16   < > 8   < > 9    < > = values in this interval not displayed       Lipid Profile:     Coags:   Results from last 7 days   Lab Units 01/21/22  1359   INR  1 77*

## 2022-01-26 NOTE — ASSESSMENT & PLAN NOTE
Wt Readings from Last 3 Encounters:   01/26/22 97 2 kg (214 lb 4 6 oz)   01/18/22 91 7 kg (202 lb 2 6 oz)   01/18/22 91 6 kg (201 lb 14 4 oz)     -symptoms currently improved following initiation of HD  -continue current medical therapy

## 2022-01-26 NOTE — ASSESSMENT & PLAN NOTE
Lab Results   Component Value Date    HGBA1C 5 7 (H) 12/14/2021       Recent Labs     01/25/22  1945 01/26/22  0557 01/26/22  1053 01/26/22  1554   POCGLU 213* 133 236* 218*       Blood Sugar Average: Last 72 hrs:  (P) 429 [id number]     · Hold home hypoglycemic agents    · Sliding scale insulin with Accu-Cheks  · Diabetic diet

## 2022-01-26 NOTE — ASSESSMENT & PLAN NOTE
Lab Results   Component Value Date    EGFR 16 01/26/2022    EGFR 16 01/25/2022    EGFR 11 01/24/2022    CREATININE 3 66 (H) 01/26/2022    CREATININE 3 60 (H) 01/25/2022    CREATININE 4 87 (H) 01/24/2022   -currently being followed by Nephrology  -volume management per Nephrology

## 2022-01-26 NOTE — PROGRESS NOTES
Progress Note - Nephrology   Trevor English 74 y o  male MRN: [id number]  Unit/Bed#: -01 Encounter: [id number]    Assessment and Plan    1  Hemodialysis-dependent acute kidney injury (POA)  · Hemodialysis on 01/23/2022 and 01/24/2022  Dialysis held 01/25/2022 and 01/26/2022  Re-evaluate tomorrow  If dialysis needs to be continued, will need removal of temporary dialysis catheter in placement of PermCath  · Request renal diet, renally dose medications, avoid magnesium or aluminum-containing medications to avoid toxicity  Trend renal function  Continue to provide supportive care  Optimize hemodynamics  Maintain mean arterial pressure greater than 65 mmHg  Renally dose medications  Avoid nephrotoxins  Please discuss with renal any diuretics or possible nephrotoxic agents, such as NSAIDs or IV contrast  Recommend avoidance of PPIs in favor of H2 blocker, if appropriate for clinical scenario  Monitor for urinary retention- strict I&Os, record bladder scan PVRs and follow urinary retention protocol  2  Access  · Right IJ temporary hemodialysis catheter in place  Will need PermCath if dialysis is continued  3  Stage 4 chronic kidney disease with most recent baseline creatinine around 2 6 mg/dL  4  Hypokalemia  · Will provide potassium chloride 20 mEq by mouth today  5  Pulmonary edema, acute on chronic diastolic congestive heart failure  · 4 g sodium restriction, 1 8 L fluid restriction  6  Elevated anion gap metabolic acidosis, uremic acidosis- resolved  7  Adenocarcinoma of the colon  8  Suspected diabetic nephropathy  · Not a candidate for RAAS inhibition  10  History of contrast induced nephropathy 11/2021  11   Left arm edema  · Duplex pending      Follow up reason for today's visit:  Acute kidney injury    Acute on chronic diastolic congestive heart failure [facility])    Patient Active Problem List   Diagnosis    Bilateral leg edema    Diabetic ulcer of left foot (HCC)    Easy bruising    Eczema  Erectile dysfunction of non-organic origin    Gout    Hyperlipidemia    Uremic acidosis    Arthritis    Peripheral arterial disease (HCC)    PVCs (premature ventricular contractions)    Rhinitis    Systolic murmur    Vertigo    Complete heart block (HCC)    Metabolic acidosis    Acute kidney injury ([facility] [address] )    Other hyperlipidemia    PAF (paroxysmal atrial fibrillation) (HCC)    Persistent proteinuria    Volume overload    Urinary retention    NICOLASA (obstructive sleep apnea)    Type 2 diabetes mellitus with chronic kidney disease, with long-term current use of insulin (HCC)    Hypertension    Stage 4 chronic kidney disease (HCC)    Nonrheumatic aortic valve stenosis    Anemia    Fever    Mitral valve stenosis    Abnormal CT of the chest    Hyperkalemia    Acute pulmonary edema (HCC)    Chronic diastolic (congestive) heart failure (HCC)    Left renal artery stenosis (HCC)    S/P amputation of lesser toe, left (HCC)    S/P amputation of lesser toe, right (HCC)    Elevated troponin I level    Staphylococcus aureus bacteremia    Type 2 diabetes mellitus with diabetic neuropathy, without long-term current use of insulin (HCC)    Hyponatremia    Iron deficiency anemia    Anxiety    Osteomyelitis of left foot ([facility] [address] )    Amputation of left great toe (HCC)    Multiple drug resistant organism (MDRO) culture positive    Renovascular hypertension    SSS (sick sinus syndrome) (HCC)    Chronic obstructive pulmonary disease (HCC)    Absence of toe (HCC)    Chronic painful diabetic neuropathy (HCC)    Onychomycosis    Colon cancer (HCC)    Acute kidney injury superimposed on chronic kidney disease (HCC)    RSV (respiratory syncytial virus pneumonia)    Chemotherapy-induced neutropenia (HCC)    Nausea    Acute on chronic diastolic congestive heart failure (HCC)         Subjective:   Denies physical complaints   A complete 10 point review of systems was performed and is otherwise negative  Objective:     Vitals: Blood pressure 152/85, pulse 61, temperature 99 2 °F (37 3 °C), resp  rate 18, height 5' 10" (1 778 m), weight 97 2 kg (214 lb 4 6 oz), SpO2 91 %  ,Body mass index is 30 75 kg/m²  Weight (last 2 days)     Date/Time Weight    01/26/22 0557 97 2 (214 29)    01/25/22 0555 97 1 (214 07)    01/24/22 0600 93 7 (206 57)    01/24/22 0527 93 7 (206 57)            Intake/Output Summary (Last 24 hours) at 1/26/2022 1143  Last data filed at 1/26/2022 0900  Gross per 24 hour   Intake 600 ml   Output 1300 ml   Net -700 ml     I/O last 3 completed shifts: In: 240 [P O :240]  Out: 1300 [Urine:1300]    HD Temporary Double Catheter (Active)   Reasons to continue HD Cath Treatment Therapy 01/1953   Goal for Removal No longer needed- Will place order to discontinue 01/1953   Line Necessity Reviewed Yes, reviewed with provider 01/1953   Site Assessment WDL 01/1953   Proximal Lumen Status Capped 01/1953   Distal Lumen Status Capped 01/1953   Line Care Cap changed; Connections checked and tightened 01/24/22 0935   Dressing Type Chlorhexidine dressing 01/1953   Dressing Status Clean;Dry; Intact 01/1953   Dressing Change Due 01/29/22 01/1953       Physical Exam: /85   Pulse 61   Temp 99 2 °F (37 3 °C)   Resp 18   Ht 5' 10" (1 778 m)   Wt 97 2 kg (214 lb 4 6 oz)   SpO2 91%   BMI 30 75 kg/m²     General Appearance:    No acute distress  Cooperative  Appears stated age  Head:    Normocephalic  Atraumatic  Normal jaw occlusion  Eyes:    Lids, conjunctiva normal  No scleral icterus  Ears:    Normal external ears  Nose:   Nares normal  No drainage  Mouth:   Lips, tongue normal  Mucosa normal  Phonation normal    Neck:   Supple  Symmetrical    Back:     Symmetric  No CVA tenderness  Lungs:     Normal respiratory effort  Clear to auscultation bilaterally  Chest wall:    No tenderness or deformity     Heart:    Regular rate and rhythm  Normal S1 and S2  No murmur  No JVD  Left upper extremity edema  Abdomen:     Soft  Non-tender  Bowel sounds active  Genitourinary:   No Jones catheter present  Extremities:   Extremities normal  Atraumatic  No cyanosis  Skin:   Warm and dry  No pallor, jaundice, rash, ecchymoses  Neurologic:   Alert and oriented to person, place, time  No focal deficit  Lab, Imaging and other studies: I have personally reviewed pertinent labs  CBC:   Lab Results   Component Value Date    WBC 10 96 (H) 01/26/2022    HGB 11 1 (L) 01/26/2022    HCT 32 6 (L) 01/26/2022    MCV 91 01/26/2022     01/26/2022    MCH 30 8 01/26/2022    MCHC 34 0 01/26/2022    RDW 15 7 (H) 01/26/2022    MPV 10 5 01/26/2022     CMP:   Lab Results   Component Value Date    K 3 3 (L) 01/26/2022    CL 93 (L) 01/26/2022    CO2 27 01/26/2022    BUN 54 (H) 01/26/2022    CREATININE 3 66 (H) 01/26/2022    CALCIUM 8 5 01/26/2022    EGFR 16 01/26/2022         Results from last 7 days   Lab Units 01/26/22  0451 01/25/22  0605 01/24/22  0506 01/22/22  1441 01/22/22  0646 01/21/22  1359 01/21/22  1359   POTASSIUM mmol/L 3 3* 3 2* 3 5   < > 5 6*   < > 5 4*   CHLORIDE mmol/L 93* 94* 92*   < > 95*   < > 97   CO2 mmol/L 27 31 26   < > 17*   < > 14*   BUN mg/dL 54* 50* 93*   < > 112*   < > 98*   CREATININE mg/dL 3 66* 3 60* 4 87*   < > 5 94*   < > 5 53*   CALCIUM mg/dL 8 5 8 1* 8 5   < > 9 3   < > 9 1   ALK PHOS U/L  --   --   --   --  48  --  50   ALT U/L  --   --   --   --  163*  --  37   AST U/L  --   --   --   --  148*  --  37    < > = values in this interval not displayed           Phosphorus:   Lab Results   Component Value Date    PHOS 3 2 01/26/2022     Magnesium:   Lab Results   Component Value Date    MG 2 1 01/26/2022     Urinalysis: No results found for: COLORU, CLARITYU, SPECGRAV, PHUR, LEUKOCYTESUR, NITRITE, PROTEINUA, GLUCOSEU, KETONESU, BILIRUBINUR, BLOODU  Ionized Calcium: No results found for: CAION  Coagulation: No results found for: PT, INR, APTT  Troponin: No results found for: TROPONINI  ABG: No results found for: PHART, VMH8YHX, PO2ART, PVG3UNF, I0NDIJXS, BEART, SOURCE  Radiology review:     IMAGING  Procedure: US kidney and bladder with pvr    Result Date: 1/24/2022  Narrative: RENAL ULTRASOUND INDICATION:   severe josué  COMPARISON: Renal ultrasound dated 3/23/2020  TECHNIQUE:   Ultrasound of the retroperitoneum was performed with a curvilinear transducer utilizing volumetric sweeps and still imaging techniques  FINDINGS: KIDNEYS: Symmetric and within normal limits of size  Right kidney:  11 7 cm  Left kidney:  11 7 cm  Right kidney Normal echogenicity and contour  No suspicious masses detected  1 3 x 1 4 x 1 2 cm simple cyst in the interpolar region  No hydronephrosis  No shadowing calculi  No perinephric fluid collections  Left kidney Normal echogenicity and contour  No suspicious masses detected  No hydronephrosis  No shadowing calculi  No perinephric fluid collections  URETERS: Nonvisualized  BLADDER: Urinary bladder volume is 95 cc  No focal thickening or mass lesions  Bilateral ureteral jets not detected  Small pelvic free fluid  Impression: No hydronephrosis  Urinary bladder volume is 95 cc  Unable to measure post void residual as the patient voided prior to the examination via self-catheterization  Small pelvic free fluid   Workstation performed: [id number]       Current Facility-Administered Medications   Medication Dose Route Frequency    amLODIPine (NORVASC) tablet 10 mg  10 mg Oral Daily    apixaban (ELIQUIS) tablet 5 mg  5 mg Oral Q12H    calcium acetate (PHOSLO) capsule 667 mg  667 mg Oral Daily With Dinner    diphenoxylate-atropine (LOMOTIL) 2 5-0 025 mg per tablet 1 tablet  1 tablet Oral 4x Daily PRN    famotidine (PEPCID) tablet 20 mg  20 mg Oral Daily    fish oil capsule 1,000 mg  1,000 mg Oral BID    insulin lispro (HumaLOG) 100 units/mL subcutaneous injection 1-6 Units  1-6 Units Subcutaneous TID AC    insulin lispro (HumaLOG) 100 units/mL subcutaneous injection 1-6 Units  1-6 Units Subcutaneous HS    metoprolol tartrate (LOPRESSOR) tablet 50 mg  50 mg Oral Q12H    ondansetron (ZOFRAN) injection 4 mg  4 mg Intravenous Q4H PRN    prochlorperazine (COMPAZINE) tablet 5 mg  5 mg Oral Q6H PRN    saccharomyces boulardii (FLORASTOR) capsule 250 mg  250 mg Oral BID    tamsulosin (FLOMAX) capsule 0 4 mg  0 4 mg Oral Daily With Dinner     Medications Discontinued During This Encounter   Medication Reason    famotidine (PEPCID) tablet 40 mg Dose adjustment    ondansetron (ZOFRAN) injection 4 mg     bumetanide (BUMEX) injection 2 mg     metoclopramide (REGLAN) injection 10 mg     amLODIPine (NORVASC) tablet 10 mg     sodium bicarbonate tablet 1,300 mg     prochlorperazine (COMPAZINE) tablet 5 mg        Janine Rosario PA-C    Portions of the record may have been created with voice recognition software  Occasional wrong word or "sound a like" substitutions may have occurred due to the inherent limitations of voice recognition software  Read the chart carefully and recognize, using context, where substitutions have occurred

## 2022-01-26 NOTE — PLAN OF CARE
Problem: Nutrition/Hydration-ADULT  Goal: Nutrient/Hydration intake appropriate for improving, restoring or maintaining nutritional needs  Description: Monitor and assess patient's nutrition/hydration status for malnutrition  Collaborate with interdisciplinary team and initiate plan and interventions as ordered  Monitor patient's weight and dietary intake as ordered or per policy  Utilize nutrition screening tool and intervene as necessary  Determine patient's food preferences and provide high-protein, high-caloric foods as appropriate       INTERVENTIONS:  - Monitor oral intake, urinary output, labs, and treatment plans  - Assess nutrition and hydration status and recommend course of action  - Evaluate amount of meals eaten  - Assist patient with eating if necessary   - Allow adequate time for meals  - Recommend/ encourage appropriate diets, oral nutritional supplements, and vitamin/mineral supplements  - Order, calculate, and assess calorie counts as needed  - Recommend, monitor, and adjust tube feedings and TPN/PPN based on assessed needs  - Assess need for intravenous fluids  - Provide specific nutrition/hydration education as appropriate  - Include patient/family/caregiver in decisions related to nutrition  Outcome: Progressing     Problem: RESPIRATORY - ADULT  Goal: Achieves optimal ventilation and oxygenation  Description: INTERVENTIONS:  - Assess for changes in respiratory status  - Assess for changes in mentation and behavior  - Position to facilitate oxygenation and minimize respiratory effort  - Oxygen administered by appropriate delivery if ordered  - Initiate smoking cessation education as indicated  - Encourage broncho-pulmonary hygiene including cough, deep breathe, Incentive Spirometry  - Assess the need for suctioning and aspirate as needed  - Assess and instruct to report SOB or any respiratory difficulty  - Respiratory Therapy support as indicated  Outcome: Progressing     Problem: GASTROINTESTINAL - ADULT  Goal: Minimal or absence of nausea and/or vomiting  Description: INTERVENTIONS:  - Administer IV fluids if ordered to ensure adequate hydration  - Maintain NPO status until nausea and vomiting are resolved  - Nasogastric tube if ordered  - Administer ordered antiemetic medications as needed  - Provide nonpharmacologic comfort measures as appropriate  - Advance diet as tolerated, if ordered  - Consider nutrition services referral to assist patient with adequate nutrition and appropriate food choices  Outcome: Progressing  Goal: Maintains or returns to baseline bowel function  Description: INTERVENTIONS:  - Assess bowel function  - Encourage oral fluids to ensure adequate hydration  - Administer IV fluids if ordered to ensure adequate hydration  - Administer ordered medications as needed  - Encourage mobilization and activity  - Consider nutritional services referral to assist patient with adequate nutrition and appropriate food choices  Outcome: Progressing  Goal: Maintains adequate nutritional intake  Description: INTERVENTIONS:  - Monitor percentage of each meal consumed  - Identify factors contributing to decreased intake, treat as appropriate  - Assist with meals as needed  - Monitor I&O, weight, and lab values if indicated  - Obtain nutrition services referral as needed  Outcome: Progressing  Goal: Oral mucous membranes remain intact  Description: INTERVENTIONS  - Assess oral mucosa and hygiene practices  - Implement preventative oral hygiene regimen  - Implement oral medicated treatments as ordered  - Initiate Nutrition services referral as needed  Outcome: Progressing     Problem: PAIN - ADULT  Goal: Verbalizes/displays adequate comfort level or baseline comfort level  Description: Interventions:  - Encourage patient to monitor pain and request assistance  - Assess pain using appropriate pain scale  - Administer analgesics based on type and severity of pain and evaluate response  - Implement non-pharmacological measures as appropriate and evaluate response  - Consider cultural and social influences on pain and pain management  - Notify physician/advanced practitioner if interventions unsuccessful or patient reports new pain  Outcome: Progressing     Problem: INFECTION - ADULT  Goal: Absence or prevention of progression during hospitalization  Description: INTERVENTIONS:  - Assess and monitor for signs and symptoms of infection  - Monitor lab/diagnostic results  - Monitor all insertion sites, i e  indwelling lines, tubes, and drains  - Monitor endotracheal if appropriate and nasal secretions for changes in amount and color  - [place] appropriate cooling/warming therapies per order  - Administer medications as ordered  - Instruct and encourage patient and family to use good hand hygiene technique  - Identify and instruct in appropriate isolation precautions for identified infection/condition  Outcome: Progressing     Problem: SAFETY ADULT  Goal: Patient will remain free of falls  Description: INTERVENTIONS:  - Educate patient/family on patient safety including physical limitations  - Instruct patient to call for assistance with activity   - Consult OT/PT to assist with strengthening/mobility   - Keep Call bell within reach  - Keep bed low and locked with side rails adjusted as appropriate  - Keep care items and personal belongings within reach  - Initiate and maintain comfort rounds  - Make Fall Risk Sign visible to staff  - Offer Toileting every 2 Hours, in advance of need  - Apply yellow socks and bracelet for high fall risk patients  - Consider moving patient to room near nurses station  Outcome: Progressing  Goal: Maintain or return to baseline ADL function  Description: INTERVENTIONS:  -  Assess patient's ability to carry out ADLs; assess patient's baseline for ADL function and identify physical deficits which impact ability to perform ADLs (bathing, care of mouth/teeth, toileting, grooming, dressing, etc )  - Assess/evaluate cause of self-care deficits   - Assess range of motion  - Assess patient's mobility; develop plan if impaired  - Assess patient's need for assistive devices and provide as appropriate  - Encourage maximum independence but intervene and supervise when necessary  - Involve family in performance of ADLs  - Assess for home care needs following discharge   - Consider OT consult to assist with ADL evaluation and planning for discharge  - Provide patient education as appropriate  Outcome: Progressing  Goal: Maintains/Returns to pre admission functional level  Description: INTERVENTIONS:  - Perform BMAT or MOVE assessment daily    - Set and communicate daily mobility goal to care team and patient/family/caregiver  - Collaborate with rehabilitation services on mobility goals if consulted  - Out of bed for toileting  - Record patient progress and toleration of activity level   Outcome: Progressing     Problem: DISCHARGE PLANNING  Goal: Discharge to home or other facility with appropriate resources  Description: INTERVENTIONS:  - Identify barriers to discharge w/patient and caregiver  - Arrange for needed discharge resources and transportation as appropriate  - Identify discharge learning needs (meds, wound care, etc )  - Arrange for interpretive services to assist at discharge as needed  - Refer to Case Management Department for coordinating discharge planning if the patient needs post-hospital services based on physician/advanced practitioner order or complex needs related to functional status, cognitive ability, or social support system  Outcome: Progressing     Problem: Knowledge Deficit  Goal: Patient/family/caregiver demonstrates understanding of disease process, treatment plan, medications, and discharge instructions  Description: Complete learning assessment and assess knowledge base    Interventions:  - Provide teaching at level of understanding  - Provide teaching via preferred learning methods  Outcome: Progressing     Problem: Prexisting or High Potential for Compromised Skin Integrity  Goal: Skin integrity is maintained or improved  Description: INTERVENTIONS:  - Identify patients at risk for skin breakdown  - Assess and monitor skin integrity  - Assess and monitor nutrition and hydration status  - Monitor labs   - Assess for incontinence   - Turn and reposition patient  - Assist with mobility/ambulation  - Relieve pressure over bony prominences  - Avoid friction and shearing  - Provide appropriate hygiene as needed including keeping skin clean and dry  - Evaluate need for skin moisturizer/barrier cream  - Collaborate with interdisciplinary team   - Patient/family teaching  - Consider wound care consult   Outcome: Progressing     Problem: MOBILITY - ADULT  Goal: Maintain or return to baseline ADL function  Description: INTERVENTIONS:  -  Assess patient's ability to carry out ADLs; assess patient's baseline for ADL function and identify physical deficits which impact ability to perform ADLs (bathing, care of mouth/teeth, toileting, grooming, dressing, etc )  - Assess/evaluate cause of self-care deficits   - Assess range of motion  - Assess patient's mobility; develop plan if impaired  - Assess patient's need for assistive devices and provide as appropriate  - Encourage maximum independence but intervene and supervise when necessary  - Involve family in performance of ADLs  - Assess for home care needs following discharge   - Consider OT consult to assist with ADL evaluation and planning for discharge  - Provide patient education as appropriate  Outcome: Progressing  Goal: Maintains/Returns to pre admission functional level  Description: INTERVENTIONS:  - Perform BMAT or MOVE assessment daily    - Set and communicate daily mobility goal to care team and patient/family/caregiver     - Collaborate with rehabilitation services on mobility goals if consulted  - Out of bed for toileting  - Record patient progress and toleration of activity level   Outcome: Progressing     Problem: Potential for Falls  Goal: Patient will remain free of falls  Description: INTERVENTIONS:  - Educate patient/family on patient safety including physical limitations  - Instruct patient to call for assistance with activity   - Consult OT/PT to assist with strengthening/mobility   - Keep Call bell within reach  - Keep bed low and locked with side rails adjusted as appropriate  - Keep care items and personal belongings within reach  - Initiate and maintain comfort rounds  - Make Fall Risk Sign visible to staff  - Offer Toileting every 2 Hours, in advance of need  - Apply yellow socks and bracelet for high fall risk patients  - Consider moving patient to room near nurses station  Outcome: Progressing     Problem: METABOLIC, FLUID AND ELECTROLYTES - ADULT  Goal: Electrolytes maintained within normal limits  Description: INTERVENTIONS:  - Monitor labs and assess patient for signs and symptoms of electrolyte imbalances  - Administer electrolyte replacement as ordered  - Monitor response to electrolyte replacements, including repeat lab results as appropriate  - Instruct patient on fluid and nutrition as appropriate  Outcome: Progressing  Goal: Fluid balance maintained  Description: INTERVENTIONS:  - Monitor labs   - Monitor I/O and WT  - Instruct patient on fluid and nutrition as appropriate  - Assess for signs & symptoms of volume excess or deficit  Outcome: Progressing

## 2022-01-26 NOTE — PROGRESS NOTES
Nir 43  Progress Note General Jason 1955, 74 y o  male MRN: [id number]  Unit/Bed#: -Ame Encounter: [id number]  Primary Care Provider: Sergei Desir DO   Date and time admitted to hospital: 1/21/2022  1:36 PM    * Acute on chronic diastolic congestive heart failure ([address] )  Assessment & Plan  · Presented for shortness of breath while receiving fluids at infusion center due to nausea from chemotherapy  · He is on 4 L of oxygen chronically  · He was placed on CPAP by EMS prior to arrival for increased work of breathing  · EKG:  Paced rhythm  · BNP: 1067  · Chest x-ray: Persistent or recurrent pulmonary vascular congestion persistent cardiomegaly   · He received 40 mg of IV Lasix followed by Flomax 2 mg IV x 2-ultimately required initiation of dialysis  · Daily weights and I&Os  · Cardiology recommendations appreciated- continue current regimen    Acute kidney injury superimposed on chronic kidney disease [facility])  Assessment & Plan  Lab Results   Component Value Date    EGFR 16 01/26/2022    EGFR 16 01/25/2022    EGFR 11 01/24/2022    CREATININE 3 66 (H) 01/26/2022    CREATININE 3 60 (H) 01/25/2022    CREATININE 4 87 (H) 01/24/2022     · History of CKD, recently started chemotherapy for colon cancer with significant worsening of kidney function with hospitalization for creatinine around 0 7  Baseline creatinine level is around 3  · ER PA consulted nephrology who recommended Lasix 80 mg and nephrology consultation  · I&O, daily weights  · Urinary retention protocol  · Nephrology recommendations appreciated- dialysis was initiated due to volume overload and electrolyte abnormalities  Dialysis held today per Nephrology  Re-evaluate over the next 24-48 hours to determine if HD is needed permanently  Hyperkalemia  Assessment & Plan  · Potasium 6 0 >>4 8>> 3 5 after dialysis, now 3 3    · HD originally planned for today, but held per nephrology, given improvement in labs  Nephrology plans to re-evaluate tomorrow whether for not hemodialysis will be permanent  · Recheck metabolic panel and trend potassium      Hyponatremia  Assessment & Plan  · Sodium 127>> 132 after dialysis, now 131  · Recheck am metabolic panel     PAF (paroxysmal atrial fibrillation) ([place] [address] )  Assessment & Plan  · Cardiology consult appreciated  -currently appears to be paced on telemetry with underlying atrial flutter heart rate 60 on telemetry  -continue current anticoagulation with Eliquis 5 mg twice daily  -continue metoprolol tartrate 50 mg twice daily    Diabetic ulcer of left foot [facility])  Assessment & Plan  Lab Results   Component Value Date    HGBA1C 5 7 (H) 12/14/2021       Recent Labs     01/25/22  1945 01/26/22  0557 01/26/22  1053 01/26/22  1554   POCGLU 213* 133 236* 218*       Blood Sugar Average: Last 72 hrs:  (P) 317 [id number]     · Follows up with [facility] for 20 years-with skin graft for diabetic foot ulcer  · Wound care consult placed- patient's wife requests Podiatry consultation (pending)    Colon cancer ([place] [address] )  Assessment & Plan  · Adenocarcinoma 01/08/2021 currently being treated with chemotherapy at [facility] center  · Continue outpatient follow-up after resolution of acute issue    Hypertension  Assessment & Plan  · Reasonably well controlled  · Continue pre-admission amlodipine and metoprolol      SSS (sick sinus syndrome) ([place] [address] )  Assessment & Plan  · s/p Medtronic dual-chamber permanent pacemaker  · Continue to monitor      Type 2 diabetes mellitus with chronic kidney disease, with long-term current use of insulin [facility])  Assessment & Plan  Lab Results   Component Value Date    HGBA1C 5 7 (H) 12/14/2021       Recent Labs     01/25/22  1945 01/26/22  0557 01/26/22  1053 01/26/22  1554   POCGLU 213* 133 236* 218*       Blood Sugar Average: Last 72 hrs:  (P) 996 [id number]     · Hold home hypoglycemic agents    · Sliding scale insulin with Accu-Cheks  · Diabetic diet    Uremic acidosis  Assessment & Plan  · Resolved  · AG 22, CO2 14, - improved after dialysis  · Nephrology following  · Serial labs    NICOLASA (obstructive sleep apnea)  Assessment & Plan  · CPAP at bedtime      VTE Pharmacologic Prophylaxis:   Eliquis    Patient Centered Rounds: I performed bedside rounds with nursing staff today  Discussions with Specialists or Other Care Team Provider:  Case management, nursing, Nephrology    Education and Discussions with Family / Patient: Updated  (wife) via phone  Time Spent for Care: 30 minutes  More than 50% of total time spent on counseling and coordination of care as described above  Current Length of Stay: 5 day(s)  Current Patient Status: Inpatient   Certification Statement: The patient will continue to require additional inpatient hospital stay due to Electrolyte disturbances requiring hemodialysis  Discharge Plan: Anticipate discharge in 24-48 hrs to home with home services  Code Status: Level 3 - DNAR and DNI    Subjective:   Patient seen and examined resting comfortably in bed, in no apparent distress  No acute events overnight  Reports his left arm is still swollen as compared to the right  Denies chest pain, shortness of breath, leg pain or swelling  Objective:     Vitals:   Temp (24hrs), Av 4 °F (36 9 °C), Min:97 6 °F (36 4 °C), Max:99 2 °F (37 3 °C)    Temp:  [97 6 °F (36 4 °C)-99 2 °F (37 3 °C)] 98 3 °F (36 8 °C)  HR:  [60-61] 60  Resp:  [17-20] 17  BP: (149-159)/(63-85) 149/63  SpO2:  [90 %-97 %] 97 %  Body mass index is 30 75 kg/m²  Input and Output Summary (last 24 hours): Intake/Output Summary (Last 24 hours) at 2022 1648  Last data filed at 2022 1300  Gross per 24 hour   Intake 840 ml   Output 500 ml   Net 340 ml       Physical Exam:   Physical Exam  Vitals and nursing note reviewed  Constitutional:       General: He is not in acute distress  Appearance: He is obese   He is not toxic-appearing  HENT:      Head: Normocephalic and atraumatic  Mouth/Throat:      Mouth: Mucous membranes are moist    Eyes:      Conjunctiva/sclera: Conjunctivae normal    Cardiovascular:      Rate and Rhythm: Normal rate and regular rhythm  Pulses: Normal pulses  Heart sounds: Normal heart sounds  Pulmonary:      Effort: Pulmonary effort is normal  No respiratory distress  Breath sounds: Normal breath sounds  No wheezing, rhonchi or rales  Abdominal:      General: Bowel sounds are normal  There is no distension  Palpations: Abdomen is soft  Tenderness: There is no abdominal tenderness  Musculoskeletal:         General: Swelling (Proximal portion of left upper extremity) present  Cervical back: Neck supple  Right lower leg: No edema  Left lower leg: No edema  Skin:     General: Skin is warm and dry  Neurological:      General: No focal deficit present  Mental Status: He is alert  Mental status is at baseline  Psychiatric:         Mood and Affect: Mood normal          Behavior: Behavior normal          Thought Content: Thought content normal           Additional Data:     Labs:  Results from last 7 days   Lab Units 01/26/22  0451 01/25/22  0605 01/25/22  0605   WBC Thousand/uL 10 96*   < > 7 03   HEMOGLOBIN g/dL 11 1*   < > 10 0*   HEMATOCRIT % 32 6*   < > 29 5*   PLATELETS Thousands/uL 187   < > 168   NEUTROS PCT %  --   --  67   LYMPHS PCT %  --   --  17   MONOS PCT %  --   --  11   EOS PCT %  --   --  4    < > = values in this interval not displayed       Results from last 7 days   Lab Units 01/26/22  0451 01/22/22  1441 01/22/22  0646   SODIUM mmol/L 131*   < > 127*   POTASSIUM mmol/L 3 3*   < > 5 6*   CHLORIDE mmol/L 93*   < > 95*   CO2 mmol/L 27   < > 17*   BUN mg/dL 54*   < > 112*   CREATININE mg/dL 3 66*   < > 5 94*   ANION GAP mmol/L 11   < > 15*   CALCIUM mg/dL 8 5   < > 9 3   ALBUMIN g/dL  --   --  3 9   TOTAL BILIRUBIN mg/dL  --   --  1 10* ALK PHOS U/L  --   --  48   ALT U/L  --   --  163*   AST U/L  --   --  148*   GLUCOSE RANDOM mg/dL 130   < > 166*    < > = values in this interval not displayed  Results from last 7 days   Lab Units 01/21/22  1359   INR  1 77*     Results from last 7 days   Lab Units 01/26/22  1554 01/26/22  1053 01/26/22  0557 01/25/22  1945 01/25/22  1609 01/25/22  1138 01/25/22  0553 01/24/22 2012 01/24/22  0700 01/23/22  2203 01/23/22  1615 01/23/22  1041   POC GLUCOSE mg/dl 218* 236* 133 213* 218* 259* 94 178* 158* 251* 190* 242*         Results from last 7 days   Lab Units 01/22/22  1441   LACTIC ACID mmol/L 1 2       Lines/Drains:  Invasive Devices  Report    Central Venous Catheter Line            Port A Cath Right Subclavian -- days          Hemodialysis Catheter            HD Temporary Double Catheter 3 days                Central Line:  Goal for removal: Required for hemodialysis             Imaging: No pertinent imaging reviewed      Recent Cultures (last 7 days):         Last 24 Hours Medication List:   Current Facility-Administered Medications   Medication Dose Route Frequency Provider Last Rate    amLODIPine  10 mg Oral Daily Emilie Quevedo, PAT      apixaban  5 mg Oral Q12H Anisa Moser, CRNP      calcium acetate  667 mg Oral Daily With [facility], CRNP      diphenoxylate-atropine  1 tablet Oral 4x Daily PRN Shreyasteetee Couch PA-C      famotidine  20 mg Oral Daily PAT Landa      fish oil  1,000 mg Oral BID Anisa Moser, CRNP      insulin lispro  1-6 Units Subcutaneous TID AC Anisa Moser, CRNP      insulin lispro  1-6 Units Subcutaneous HS PAT Landa      metoprolol tartrate  50 mg Oral Q12H PAT Landa      ondansetron  4 mg Intravenous Q4H PRN PAT Jordan      prochlorperazine  5 mg Oral Q6H PRN JAYME Garcia-ANNIE      saccharomyces boulardii  250 mg Oral BID Shreyas MOE Couch      tamsulosin  0 4 mg Oral Daily With [address] [place] [address], [facility]          Today, Patient Was Seen By: Rodo Hess PA-C    **Please Note: This note may have been constructed using a voice recognition system  **

## 2022-01-26 NOTE — ASSESSMENT & PLAN NOTE
Lab Results   Component Value Date    EGFR 16 01/26/2022    EGFR 16 01/25/2022    EGFR 11 01/24/2022    CREATININE 3 66 (H) 01/26/2022    CREATININE 3 60 (H) 01/25/2022    CREATININE 4 87 (H) 01/24/2022     · History of CKD, recently started chemotherapy for colon cancer with significant worsening of kidney function with hospitalization for creatinine around 0 7  Baseline creatinine level is around 3  · ER PA consulted nephrology who recommended Lasix 80 mg and nephrology consultation  · I&O, daily weights  · Urinary retention protocol  · Nephrology recommendations appreciated- dialysis was initiated due to volume overload and electrolyte abnormalities  Dialysis held today per Nephrology  Re-evaluate over the next 24-48 hours to determine if HD is needed permanently

## 2022-01-26 NOTE — ASSESSMENT & PLAN NOTE
Lab Results   Component Value Date    HGBA1C 5 7 (H) 12/14/2021       Recent Labs     01/25/22  1945 01/26/22  0557 01/26/22  1053 01/26/22  1554   POCGLU 213* 133 236* 218*       Blood Sugar Average: Last 72 hrs:  (P) 249 [id number]     · Follows up with [facility] for 20 years-with skin graft for diabetic foot ulcer  · Wound care consult placed- patient's wife requests Podiatry consultation (pending)

## 2022-01-26 NOTE — ASSESSMENT & PLAN NOTE
· Potasium 6 0 >>4 8>> 3 5 after dialysis, now 3 3    · HD originally planned for today, but held per nephrology, given improvement in labs  Nephrology plans to re-evaluate tomorrow whether for not hemodialysis will be permanent    · Recheck metabolic panel and trend potassium

## 2022-01-27 ENCOUNTER — APPOINTMENT (INPATIENT)
Dept: RADIOLOGY | Facility: HOSPITAL | Age: 67
DRG: 682 | End: 2022-01-27
Payer: MEDICARE

## 2022-01-27 LAB
ANION GAP SERPL CALCULATED.3IONS-SCNC: 9 MMOL/L (ref 4–13)
BASOPHILS # BLD AUTO: 0.02 THOUSANDS/ΜL (ref 0–0.1)
BASOPHILS NFR BLD AUTO: 0 % (ref 0–1)
BUN SERPL-MCNC: 61 MG/DL (ref 5–25)
CALCIUM SERPL-MCNC: 7.9 MG/DL (ref 8.4–10.2)
CHLORIDE SERPL-SCNC: 91 MMOL/L (ref 96–108)
CO2 SERPL-SCNC: 29 MMOL/L (ref 21–32)
CREAT SERPL-MCNC: 3.89 MG/DL (ref 0.6–1.3)
EOSINOPHIL # BLD AUTO: 0.16 THOUSAND/ΜL (ref 0–0.61)
EOSINOPHIL NFR BLD AUTO: 1 % (ref 0–6)
ERYTHROCYTE [DISTWIDTH] IN BLOOD BY AUTOMATED COUNT: 16.2 % (ref 11.6–15.1)
GFR SERPL CREATININE-BSD FRML MDRD: 14 ML/MIN/1.73SQ M
GLUCOSE SERPL-MCNC: 135 MG/DL (ref 65–140)
GLUCOSE SERPL-MCNC: 137 MG/DL (ref 65–140)
GLUCOSE SERPL-MCNC: 183 MG/DL (ref 65–140)
GLUCOSE SERPL-MCNC: 193 MG/DL (ref 65–140)
GLUCOSE SERPL-MCNC: 229 MG/DL (ref 65–140)
HCT VFR BLD AUTO: 29.8 % (ref 36.5–49.3)
HGB BLD-MCNC: 10.2 G/DL (ref 12–17)
IMM GRANULOCYTES # BLD AUTO: 0.22 THOUSAND/UL (ref 0–0.2)
IMM GRANULOCYTES NFR BLD AUTO: 2 % (ref 0–2)
LYMPHOCYTES # BLD AUTO: 0.99 THOUSANDS/ΜL (ref 0.6–4.47)
LYMPHOCYTES NFR BLD AUTO: 8 % (ref 14–44)
MCH RBC QN AUTO: 31.4 PG (ref 26.8–34.3)
MCHC RBC AUTO-ENTMCNC: 34.2 G/DL (ref 31.4–37.4)
MCV RBC AUTO: 92 FL (ref 82–98)
MONOCYTES # BLD AUTO: 1.47 THOUSAND/ΜL (ref 0.17–1.22)
MONOCYTES NFR BLD AUTO: 12 % (ref 4–12)
NEUTROPHILS # BLD AUTO: 9.03 THOUSANDS/ΜL (ref 1.85–7.62)
NEUTS SEG NFR BLD AUTO: 77 % (ref 43–75)
NRBC BLD AUTO-RTO: 0 /100 WBCS
PLATELET # BLD AUTO: 167 THOUSANDS/UL (ref 149–390)
PMV BLD AUTO: 9.8 FL (ref 8.9–12.7)
POTASSIUM SERPL-SCNC: 3.3 MMOL/L (ref 3.5–5.3)
RBC # BLD AUTO: 3.25 MILLION/UL (ref 3.88–5.62)
SODIUM SERPL-SCNC: 129 MMOL/L (ref 135–147)
WBC # BLD AUTO: 11.89 THOUSAND/UL (ref 4.31–10.16)

## 2022-01-27 PROCEDURE — 94660 CPAP INITIATION&MGMT: CPT

## 2022-01-27 PROCEDURE — 85025 COMPLETE CBC W/AUTO DIFF WBC: CPT | Performed by: PHYSICIAN ASSISTANT

## 2022-01-27 PROCEDURE — 99232 SBSQ HOSP IP/OBS MODERATE 35: CPT | Performed by: INTERNAL MEDICINE

## 2022-01-27 PROCEDURE — 94760 N-INVAS EAR/PLS OXIMETRY 1: CPT

## 2022-01-27 PROCEDURE — 71045 X-RAY EXAM CHEST 1 VIEW: CPT

## 2022-01-27 PROCEDURE — 80048 BASIC METABOLIC PNL TOTAL CA: CPT | Performed by: PHYSICIAN ASSISTANT

## 2022-01-27 PROCEDURE — 82948 REAGENT STRIP/BLOOD GLUCOSE: CPT

## 2022-01-27 PROCEDURE — 99232 SBSQ HOSP IP/OBS MODERATE 35: CPT

## 2022-01-27 RX ORDER — SODIUM CHLORIDE 9 MG/ML
50 INJECTION, SOLUTION INTRAVENOUS CONTINUOUS
Status: DISPENSED | OUTPATIENT
Start: 2022-01-27 | End: 2022-01-28

## 2022-01-27 RX ORDER — POTASSIUM CHLORIDE 20 MEQ/1
20 TABLET, EXTENDED RELEASE ORAL ONCE
Status: COMPLETED | OUTPATIENT
Start: 2022-01-27 | End: 2022-01-27

## 2022-01-27 RX ADMIN — Medication 250 MG: at 08:37

## 2022-01-27 RX ADMIN — Medication 250 MG: at 17:00

## 2022-01-27 RX ADMIN — OMEGA-3 FATTY ACIDS CAP 1000 MG 1000 MG: 1000 CAP at 08:38

## 2022-01-27 RX ADMIN — APIXABAN 5 MG: 5 TABLET, FILM COATED ORAL at 05:37

## 2022-01-27 RX ADMIN — METOPROLOL TARTRATE 50 MG: 50 TABLET, FILM COATED ORAL at 05:37

## 2022-01-27 RX ADMIN — POTASSIUM CHLORIDE 20 MEQ: 1500 TABLET, EXTENDED RELEASE ORAL at 15:13

## 2022-01-27 RX ADMIN — OMEGA-3 FATTY ACIDS CAP 1000 MG 1000 MG: 1000 CAP at 17:00

## 2022-01-27 RX ADMIN — INSULIN LISPRO 1 UNITS: 100 INJECTION, SOLUTION INTRAVENOUS; SUBCUTANEOUS at 21:34

## 2022-01-27 RX ADMIN — INSULIN LISPRO 2 UNITS: 100 INJECTION, SOLUTION INTRAVENOUS; SUBCUTANEOUS at 13:02

## 2022-01-27 RX ADMIN — CALCIUM ACETATE 667 MG: 667 CAPSULE ORAL at 16:58

## 2022-01-27 RX ADMIN — TAMSULOSIN HYDROCHLORIDE 0.4 MG: 0.4 CAPSULE ORAL at 16:59

## 2022-01-27 RX ADMIN — SODIUM CHLORIDE 50 ML/HR: 0.9 INJECTION, SOLUTION INTRAVENOUS at 15:19

## 2022-01-27 RX ADMIN — FAMOTIDINE 20 MG: 20 TABLET ORAL at 08:38

## 2022-01-27 RX ADMIN — METOPROLOL TARTRATE 50 MG: 50 TABLET, FILM COATED ORAL at 16:58

## 2022-01-27 RX ADMIN — APIXABAN 5 MG: 5 TABLET, FILM COATED ORAL at 17:00

## 2022-01-27 RX ADMIN — INSULIN LISPRO 2 UNITS: 100 INJECTION, SOLUTION INTRAVENOUS; SUBCUTANEOUS at 16:58

## 2022-01-27 NOTE — PLAN OF CARE
Problem: Nutrition/Hydration-ADULT  Goal: Nutrient/Hydration intake appropriate for improving, restoring or maintaining nutritional needs  Description: Monitor and assess patient's nutrition/hydration status for malnutrition  Collaborate with interdisciplinary team and initiate plan and interventions as ordered  Monitor patient's weight and dietary intake as ordered or per policy  Utilize nutrition screening tool and intervene as necessary  Determine patient's food preferences and provide high-protein, high-caloric foods as appropriate       INTERVENTIONS:  - Monitor oral intake, urinary output, labs, and treatment plans  - Assess nutrition and hydration status and recommend course of action  - Evaluate amount of meals eaten  - Assist patient with eating if necessary   - Allow adequate time for meals  - Recommend/ encourage appropriate diets, oral nutritional supplements, and vitamin/mineral supplements  - Order, calculate, and assess calorie counts as needed  - Recommend, monitor, and adjust tube feedings and TPN/PPN based on assessed needs  - Assess need for intravenous fluids  - Provide specific nutrition/hydration education as appropriate  - Include patient/family/caregiver in decisions related to nutrition  Outcome: Progressing     Problem: RESPIRATORY - ADULT  Goal: Achieves optimal ventilation and oxygenation  Description: INTERVENTIONS:  - Assess for changes in respiratory status  - Assess for changes in mentation and behavior  - Position to facilitate oxygenation and minimize respiratory effort  - Oxygen administered by appropriate delivery if ordered  - Initiate smoking cessation education as indicated  - Encourage broncho-pulmonary hygiene including cough, deep breathe, Incentive Spirometry  - Assess the need for suctioning and aspirate as needed  - Assess and instruct to report SOB or any respiratory difficulty  - Respiratory Therapy support as indicated  Outcome: Progressing     Problem: GASTROINTESTINAL - ADULT  Goal: Minimal or absence of nausea and/or vomiting  Description: INTERVENTIONS:  - Administer IV fluids if ordered to ensure adequate hydration  - Maintain NPO status until nausea and vomiting are resolved  - Nasogastric tube if ordered  - Administer ordered antiemetic medications as needed  - Provide nonpharmacologic comfort measures as appropriate  - Advance diet as tolerated, if ordered  - Consider nutrition services referral to assist patient with adequate nutrition and appropriate food choices  Outcome: Progressing  Goal: Maintains or returns to baseline bowel function  Description: INTERVENTIONS:  - Assess bowel function  - Encourage oral fluids to ensure adequate hydration  - Administer IV fluids if ordered to ensure adequate hydration  - Administer ordered medications as needed  - Encourage mobilization and activity  - Consider nutritional services referral to assist patient with adequate nutrition and appropriate food choices  Outcome: Progressing  Goal: Maintains adequate nutritional intake  Description: INTERVENTIONS:  - Monitor percentage of each meal consumed  - Identify factors contributing to decreased intake, treat as appropriate  - Assist with meals as needed  - Monitor I&O, weight, and lab values if indicated  - Obtain nutrition services referral as needed  Outcome: Progressing  Goal: Oral mucous membranes remain intact  Description: INTERVENTIONS  - Assess oral mucosa and hygiene practices  - Implement preventative oral hygiene regimen  - Implement oral medicated treatments as ordered  - Initiate Nutrition services referral as needed  Outcome: Progressing     Problem: PAIN - ADULT  Goal: Verbalizes/displays adequate comfort level or baseline comfort level  Description: Interventions:  - Encourage patient to monitor pain and request assistance  - Assess pain using appropriate pain scale  - Administer analgesics based on type and severity of pain and evaluate response  - Implement non-pharmacological measures as appropriate and evaluate response  - Consider cultural and social influences on pain and pain management  - Notify physician/advanced practitioner if interventions unsuccessful or patient reports new pain  Outcome: Progressing     Problem: INFECTION - ADULT  Goal: Absence or prevention of progression during hospitalization  Description: INTERVENTIONS:  - Assess and monitor for signs and symptoms of infection  - Monitor lab/diagnostic results  - Monitor all insertion sites, i e  indwelling lines, tubes, and drains  - Monitor endotracheal if appropriate and nasal secretions for changes in amount and color  - [place] appropriate cooling/warming therapies per order  - Administer medications as ordered  - Instruct and encourage patient and family to use good hand hygiene technique  - Identify and instruct in appropriate isolation precautions for identified infection/condition  Outcome: Progressing     Problem: SAFETY ADULT  Goal: Patient will remain free of falls  Description: INTERVENTIONS:  - Educate patient/family on patient safety including physical limitations  - Instruct patient to call for assistance with activity   - Consult OT/PT to assist with strengthening/mobility   - Keep Call bell within reach  - Keep bed low and locked with side rails adjusted as appropriate  - Keep care items and personal belongings within reach  - Initiate and maintain comfort rounds  - Make Fall Risk Sign visible to staff  - Offer Toileting every 2 Hours, in advance of need  - Apply yellow socks and bracelet for high fall risk patients  - Consider moving patient to room near nurses station  Outcome: Progressing  Goal: Maintain or return to baseline ADL function  Description: INTERVENTIONS:  -  Assess patient's ability to carry out ADLs; assess patient's baseline for ADL function and identify physical deficits which impact ability to perform ADLs (bathing, care of mouth/teeth, toileting, grooming, dressing, etc )  - Assess/evaluate cause of self-care deficits   - Assess range of motion  - Assess patient's mobility; develop plan if impaired  - Assess patient's need for assistive devices and provide as appropriate  - Encourage maximum independence but intervene and supervise when necessary  - Involve family in performance of ADLs  - Assess for home care needs following discharge   - Consider OT consult to assist with ADL evaluation and planning for discharge  - Provide patient education as appropriate  Outcome: Progressing  Goal: Maintains/Returns to pre admission functional level  Description: INTERVENTIONS:  - Perform BMAT or MOVE assessment daily    - Set and communicate daily mobility goal to care team and patient/family/caregiver  - Collaborate with rehabilitation services on mobility goals if consulted  - Out of bed for toileting  - Record patient progress and toleration of activity level   Outcome: Progressing     Problem: DISCHARGE PLANNING  Goal: Discharge to home or other facility with appropriate resources  Description: INTERVENTIONS:  - Identify barriers to discharge w/patient and caregiver  - Arrange for needed discharge resources and transportation as appropriate  - Identify discharge learning needs (meds, wound care, etc )  - Arrange for interpretive services to assist at discharge as needed  - Refer to Case Management Department for coordinating discharge planning if the patient needs post-hospital services based on physician/advanced practitioner order or complex needs related to functional status, cognitive ability, or social support system  Outcome: Progressing     Problem: Knowledge Deficit  Goal: Patient/family/caregiver demonstrates understanding of disease process, treatment plan, medications, and discharge instructions  Description: Complete learning assessment and assess knowledge base    Interventions:  - Provide teaching at level of understanding  - Provide teaching via preferred learning methods  Outcome: Progressing     Problem: Prexisting or High Potential for Compromised Skin Integrity  Goal: Skin integrity is maintained or improved  Description: INTERVENTIONS:  - Identify patients at risk for skin breakdown  - Assess and monitor skin integrity  - Assess and monitor nutrition and hydration status  - Monitor labs   - Assess for incontinence   - Turn and reposition patient  - Assist with mobility/ambulation  - Relieve pressure over bony prominences  - Avoid friction and shearing  - Provide appropriate hygiene as needed including keeping skin clean and dry  - Evaluate need for skin moisturizer/barrier cream  - Collaborate with interdisciplinary team   - Patient/family teaching  - Consider wound care consult   Outcome: Progressing     Problem: MOBILITY - ADULT  Goal: Maintain or return to baseline ADL function  Description: INTERVENTIONS:  -  Assess patient's ability to carry out ADLs; assess patient's baseline for ADL function and identify physical deficits which impact ability to perform ADLs (bathing, care of mouth/teeth, toileting, grooming, dressing, etc )  - Assess/evaluate cause of self-care deficits   - Assess range of motion  - Assess patient's mobility; develop plan if impaired  - Assess patient's need for assistive devices and provide as appropriate  - Encourage maximum independence but intervene and supervise when necessary  - Involve family in performance of ADLs  - Assess for home care needs following discharge   - Consider OT consult to assist with ADL evaluation and planning for discharge  - Provide patient education as appropriate  Outcome: Progressing  Goal: Maintains/Returns to pre admission functional level  Description: INTERVENTIONS:  - Perform BMAT or MOVE assessment daily    - Set and communicate daily mobility goal to care team and patient/family/caregiver     - Collaborate with rehabilitation services on mobility goals if consulted  - Out of bed for toileting  - Record patient progress and toleration of activity level   Outcome: Progressing     Problem: Potential for Falls  Goal: Patient will remain free of falls  Description: INTERVENTIONS:  - Educate patient/family on patient safety including physical limitations  - Instruct patient to call for assistance with activity   - Consult OT/PT to assist with strengthening/mobility   - Keep Call bell within reach  - Keep bed low and locked with side rails adjusted as appropriate  - Keep care items and personal belongings within reach  - Initiate and maintain comfort rounds  - Make Fall Risk Sign visible to staff  - Offer Toileting every 2 Hours, in advance of need  - Apply yellow socks and bracelet for high fall risk patients  - Consider moving patient to room near nurses station  Outcome: Progressing     Problem: METABOLIC, FLUID AND ELECTROLYTES - ADULT  Goal: Electrolytes maintained within normal limits  Description: INTERVENTIONS:  - Monitor labs and assess patient for signs and symptoms of electrolyte imbalances  - Administer electrolyte replacement as ordered  - Monitor response to electrolyte replacements, including repeat lab results as appropriate  - Instruct patient on fluid and nutrition as appropriate  Outcome: Progressing  Goal: Fluid balance maintained  Description: INTERVENTIONS:  - Monitor labs   - Monitor I/O and WT  - Instruct patient on fluid and nutrition as appropriate  - Assess for signs & symptoms of volume excess or deficit  Outcome: Progressing

## 2022-01-27 NOTE — PLAN OF CARE
Problem: Nutrition/Hydration-ADULT  Goal: Nutrient/Hydration intake appropriate for improving, restoring or maintaining nutritional needs  Description: Monitor and assess patient's nutrition/hydration status for malnutrition  Collaborate with interdisciplinary team and initiate plan and interventions as ordered  Monitor patient's weight and dietary intake as ordered or per policy  Utilize nutrition screening tool and intervene as necessary  Determine patient's food preferences and provide high-protein, high-caloric foods as appropriate       INTERVENTIONS:  - Monitor oral intake, urinary output, labs, and treatment plans  - Assess nutrition and hydration status and recommend course of action  - Evaluate amount of meals eaten  - Assist patient with eating if necessary   - Allow adequate time for meals  - Recommend/ encourage appropriate diets, oral nutritional supplements, and vitamin/mineral supplements  - Order, calculate, and assess calorie counts as needed  - Recommend, monitor, and adjust tube feedings and TPN/PPN based on assessed needs  - Assess need for intravenous fluids  - Provide specific nutrition/hydration education as appropriate  - Include patient/family/caregiver in decisions related to nutrition  Outcome: Progressing     Problem: RESPIRATORY - ADULT  Goal: Achieves optimal ventilation and oxygenation  Description: INTERVENTIONS:  - Assess for changes in respiratory status  - Assess for changes in mentation and behavior  - Position to facilitate oxygenation and minimize respiratory effort  - Oxygen administered by appropriate delivery if ordered  - Initiate smoking cessation education as indicated  - Encourage broncho-pulmonary hygiene including cough, deep breathe, Incentive Spirometry  - Assess the need for suctioning and aspirate as needed  - Assess and instruct to report SOB or any respiratory difficulty  - Respiratory Therapy support as indicated  Outcome: Progressing     Problem: GASTROINTESTINAL - ADULT  Goal: Minimal or absence of nausea and/or vomiting  Description: INTERVENTIONS:  - Administer IV fluids if ordered to ensure adequate hydration  - Maintain NPO status until nausea and vomiting are resolved  - Nasogastric tube if ordered  - Administer ordered antiemetic medications as needed  - Provide nonpharmacologic comfort measures as appropriate  - Advance diet as tolerated, if ordered  - Consider nutrition services referral to assist patient with adequate nutrition and appropriate food choices  Outcome: Progressing  Goal: Maintains or returns to baseline bowel function  Description: INTERVENTIONS:  - Assess bowel function  - Encourage oral fluids to ensure adequate hydration  - Administer IV fluids if ordered to ensure adequate hydration  - Administer ordered medications as needed  - Encourage mobilization and activity  - Consider nutritional services referral to assist patient with adequate nutrition and appropriate food choices  Outcome: Progressing  Goal: Maintains adequate nutritional intake  Description: INTERVENTIONS:  - Monitor percentage of each meal consumed  - Identify factors contributing to decreased intake, treat as appropriate  - Assist with meals as needed  - Monitor I&O, weight, and lab values if indicated  - Obtain nutrition services referral as needed  Outcome: Progressing  Goal: Oral mucous membranes remain intact  Description: INTERVENTIONS  - Assess oral mucosa and hygiene practices  - Implement preventative oral hygiene regimen  - Implement oral medicated treatments as ordered  - Initiate Nutrition services referral as needed  Outcome: Progressing     Problem: PAIN - ADULT  Goal: Verbalizes/displays adequate comfort level or baseline comfort level  Description: Interventions:  - Encourage patient to monitor pain and request assistance  - Assess pain using appropriate pain scale  - Administer analgesics based on type and severity of pain and evaluate response  - Implement non-pharmacological measures as appropriate and evaluate response  - Consider cultural and social influences on pain and pain management  - Notify physician/advanced practitioner if interventions unsuccessful or patient reports new pain  Outcome: Progressing     Problem: INFECTION - ADULT  Goal: Absence or prevention of progression during hospitalization  Description: INTERVENTIONS:  - Assess and monitor for signs and symptoms of infection  - Monitor lab/diagnostic results  - Monitor all insertion sites, i e  indwelling lines, tubes, and drains  - Monitor endotracheal if appropriate and nasal secretions for changes in amount and color  - [place] appropriate cooling/warming therapies per order  - Administer medications as ordered  - Instruct and encourage patient and family to use good hand hygiene technique  - Identify and instruct in appropriate isolation precautions for identified infection/condition  Outcome: Progressing     Problem: SAFETY ADULT  Goal: Patient will remain free of falls  Description: INTERVENTIONS:  - Educate patient/family on patient safety including physical limitations  - Instruct patient to call for assistance with activity   - Consult OT/PT to assist with strengthening/mobility   - Keep Call bell within reach  - Keep bed low and locked with side rails adjusted as appropriate  - Keep care items and personal belongings within reach  - Initiate and maintain comfort rounds  - Make Fall Risk Sign visible to staff  - Offer Toileting every 2 Hours, in advance of need  - Apply yellow socks and bracelet for high fall risk patients  - Consider moving patient to room near nurses station  Outcome: Progressing  Goal: Maintain or return to baseline ADL function  Description: INTERVENTIONS:  -  Assess patient's ability to carry out ADLs; assess patient's baseline for ADL function and identify physical deficits which impact ability to perform ADLs (bathing, care of mouth/teeth, toileting, grooming, dressing, etc )  - Assess/evaluate cause of self-care deficits   - Assess range of motion  - Assess patient's mobility; develop plan if impaired  - Assess patient's need for assistive devices and provide as appropriate  - Encourage maximum independence but intervene and supervise when necessary  - Involve family in performance of ADLs  - Assess for home care needs following discharge   - Consider OT consult to assist with ADL evaluation and planning for discharge  - Provide patient education as appropriate  Outcome: Progressing  Goal: Maintains/Returns to pre admission functional level  Description: INTERVENTIONS:  - Perform BMAT or MOVE assessment daily    - Set and communicate daily mobility goal to care team and patient/family/caregiver  - Collaborate with rehabilitation services on mobility goals if consulted  - Out of bed for toileting  - Record patient progress and toleration of activity level   Outcome: Progressing     Problem: DISCHARGE PLANNING  Goal: Discharge to home or other facility with appropriate resources  Description: INTERVENTIONS:  - Identify barriers to discharge w/patient and caregiver  - Arrange for needed discharge resources and transportation as appropriate  - Identify discharge learning needs (meds, wound care, etc )  - Arrange for interpretive services to assist at discharge as needed  - Refer to Case Management Department for coordinating discharge planning if the patient needs post-hospital services based on physician/advanced practitioner order or complex needs related to functional status, cognitive ability, or social support system  Outcome: Progressing     Problem: Knowledge Deficit  Goal: Patient/family/caregiver demonstrates understanding of disease process, treatment plan, medications, and discharge instructions  Description: Complete learning assessment and assess knowledge base    Interventions:  - Provide teaching at level of understanding  - Provide teaching via preferred learning methods  Outcome: Progressing     Problem: Prexisting or High Potential for Compromised Skin Integrity  Goal: Skin integrity is maintained or improved  Description: INTERVENTIONS:  - Identify patients at risk for skin breakdown  - Assess and monitor skin integrity  - Assess and monitor nutrition and hydration status  - Monitor labs   - Assess for incontinence   - Turn and reposition patient  - Assist with mobility/ambulation  - Relieve pressure over bony prominences  - Avoid friction and shearing  - Provide appropriate hygiene as needed including keeping skin clean and dry  - Evaluate need for skin moisturizer/barrier cream  - Collaborate with interdisciplinary team   - Patient/family teaching  - Consider wound care consult   Outcome: Progressing     Problem: MOBILITY - ADULT  Goal: Maintain or return to baseline ADL function  Description: INTERVENTIONS:  -  Assess patient's ability to carry out ADLs; assess patient's baseline for ADL function and identify physical deficits which impact ability to perform ADLs (bathing, care of mouth/teeth, toileting, grooming, dressing, etc )  - Assess/evaluate cause of self-care deficits   - Assess range of motion  - Assess patient's mobility; develop plan if impaired  - Assess patient's need for assistive devices and provide as appropriate  - Encourage maximum independence but intervene and supervise when necessary  - Involve family in performance of ADLs  - Assess for home care needs following discharge   - Consider OT consult to assist with ADL evaluation and planning for discharge  - Provide patient education as appropriate  Outcome: Progressing  Goal: Maintains/Returns to pre admission functional level  Description: INTERVENTIONS:  - Perform BMAT or MOVE assessment daily    - Set and communicate daily mobility goal to care team and patient/family/caregiver     - Collaborate with rehabilitation services on mobility goals if consulted  - Out of bed for toileting  - Record patient progress and toleration of activity level   Outcome: Progressing     Problem: Potential for Falls  Goal: Patient will remain free of falls  Description: INTERVENTIONS:  - Educate patient/family on patient safety including physical limitations  - Instruct patient to call for assistance with activity   - Consult OT/PT to assist with strengthening/mobility   - Keep Call bell within reach  - Keep bed low and locked with side rails adjusted as appropriate  - Keep care items and personal belongings within reach  - Initiate and maintain comfort rounds  - Make Fall Risk Sign visible to staff  - Offer Toileting every 2 Hours, in advance of need  - Apply yellow socks and bracelet for high fall risk patients  - Consider moving patient to room near nurses station  Outcome: Progressing     Problem: METABOLIC, FLUID AND ELECTROLYTES - ADULT  Goal: Electrolytes maintained within normal limits  Description: INTERVENTIONS:  - Monitor labs and assess patient for signs and symptoms of electrolyte imbalances  - Administer electrolyte replacement as ordered  - Monitor response to electrolyte replacements, including repeat lab results as appropriate  - Instruct patient on fluid and nutrition as appropriate  Outcome: Progressing  Goal: Fluid balance maintained  Description: INTERVENTIONS:  - Monitor labs   - Monitor I/O and WT  - Instruct patient on fluid and nutrition as appropriate  - Assess for signs & symptoms of volume excess or deficit  Outcome: Progressing   Received pt for care at 1900  Pt AAO x4  VSS and temp 99 8  Denies pain or discomfort  Resp easy and even at rest  Placed on C-pap at [address] by resp therapist  Plan of care reviewed with pt  Call bell in reach  Will continue to monitor closely

## 2022-01-27 NOTE — NURSING NOTE
Pt presently in bed in no acute distress eating dinner and visiting with family , callbell in reach of the pt

## 2022-01-27 NOTE — RESPIRATORY THERAPY NOTE
01/27/22 0515   Respiratory Assessment   Assessment Type Assess only   General Appearance Sleeping   Respiratory Pattern Assisted   Chest Assessment Chest expansion symmetrical   Bilateral Breath Sounds Clear;Diminished   Cough Unable to assess   Resp Comments no changes sancho well    O2 Device cpap w/ o2    Additional Assessments   Pulse 56   Respirations 15   SpO2 98 %   Position Semi-[place]'s

## 2022-01-27 NOTE — ASSESSMENT & PLAN NOTE
Lab Results   Component Value Date    HGBA1C 5 7 (H) 12/14/2021       Recent Labs     01/26/22  1053 01/26/22  1554 01/26/22 2058 01/27/22  0611   POCGLU 236* 218* 210* 135       Blood Sugar Average: Last 72 hrs:  (P) 802 [id number]     · Hold home hypoglycemic agents    · Sliding scale insulin with Accu-Cheks  · Diabetic diet

## 2022-01-27 NOTE — ASSESSMENT & PLAN NOTE
Lab Results   Component Value Date    EGFR 14 01/27/2022    EGFR 16 01/26/2022    EGFR 16 01/25/2022    CREATININE 3 89 (H) 01/27/2022    CREATININE 3 66 (H) 01/26/2022    CREATININE 3 60 (H) 01/25/2022     · History of CKD, recently started chemotherapy for colon cancer with significant worsening of kidney function with hospitalization for creatinine around 0 7  Baseline creatinine level is around 3  · ER PA consulted nephrology who recommended Lasix 80 mg and nephrology consultation  · I&O, daily weights  · Urinary retention protocol  · Nephrology recommendations appreciated   - Dalysis was initiated due to volume overload and electrolyte abnormalities  - Additional dialysis held yesterday due to improving electrolytes and kidney function   - Per nephrology, does not need dialysis permanently  Plan for temporary cath removal tomorrow am, and subsequent discharge home

## 2022-01-27 NOTE — PROGRESS NOTES
Progress Note - Nephrology   Duke Sommers 74 y o  male MRN: [id number]  Unit/Bed#: -01 Encounter: [id number]    A/P:  1  Hemodialysis requiring acute kidney injury superimposed on CKD 4   - Patient was admitted after chemotherapy treatment   - Was treated with Zofran and IVF and developed volume overload   - He received hemodialysis for 2 treatments and had subsequent renal stability to his baseline   - He is nonoliguric and has clear yellow urine without difficulty   -  Creatinine has been in the 3 6-3 8 mg/dl range (eGFR 14-16) over past few days   -  Will hold dialysis and remove temporary dialysis catheter in the morning if stable   - I reviewed the above with him and he understands    2  Hyponatremia and hypokalemia   - Will administer 500 mll of saline at 50 ml/hr   - K-Dur 20 meq X 1 ordered    - Labs in the am   - Expect discharge home in the morning    3  Pulmonary edema   - Resolved    4  High anion gap metabolic acidosis   - Resolved    5  Adenocarcinoma oif the colon   - s/p chemo tx with adverse side effects   -He is reluctant to resume treatment   - He will need to discuss this with oncology for alternate treatments    6   Left arm edema   - VAS negative for acute or chronic DVT and no evidence of superficial thrombophlebitis            Follow up reason for today's visit: Hemodialysis requiring acute kidney injury superimposed on CKD 4    Acute on chronic diastolic congestive heart failure [facility])    Patient Active Problem List   Diagnosis    Bilateral leg edema    Diabetic ulcer of left foot (HCC)    Easy bruising    Eczema    Erectile dysfunction of non-organic origin    Gout    Hyperlipidemia    Uremic acidosis    Arthritis    Peripheral arterial disease (HCC)    PVCs (premature ventricular contractions)    Rhinitis    Systolic murmur    Vertigo    Complete heart block (HCC)    Metabolic acidosis    Acute kidney injury ([address] )    Other hyperlipidemia    PAF [facility]oxysmal atrial fibrillation) [facility] [address] [facility]    Persistent proteinuria    Volume overload    Urinary retention    NICOLASA (obstructive sleep apnea)    Type 2 diabetes mellitus with chronic kidney disease, with long-term current use of insulin (HCC)    Hypertension    Stage 4 chronic kidney disease (HCC)    Nonrheumatic aortic valve stenosis    Anemia    Fever    Mitral valve stenosis    Abnormal CT of the chest    Hyperkalemia    Acute pulmonary edema (HCC)    Chronic diastolic (congestive) heart failure (HCC)    Left renal artery stenosis (HCC)    S/P amputation of lesser toe, left (HCC)    S/P amputation of lesser toe, right (HCC)    Elevated troponin I level    Staphylococcus aureus bacteremia    Type 2 diabetes mellitus with diabetic neuropathy, without long-term current use of insulin (HCC)    Hyponatremia    Iron deficiency anemia    Anxiety    Osteomyelitis of left foot (HCC)    Amputation of left great toe (HCC)    Multiple drug resistant organism (MDRO) culture positive    Renovascular hypertension    SSS (sick sinus syndrome) (HCC)    Chronic obstructive pulmonary disease (HCC)    Absence of toe (HCC)    Chronic painful diabetic neuropathy (HCC)    Onychomycosis    Colon cancer (HCC)    Acute kidney injury superimposed on chronic kidney disease (HCC)    RSV (respiratory syncytial virus pneumonia)    Chemotherapy-induced neutropenia (HCC)    Nausea    Acute on chronic diastolic congestive heart failure (HCC)         Subjective:   He feels well  A 10 point ROS was completely negative  Appetite is fine without further nausea    Objective:     Vitals: Blood pressure 127/62, pulse 70, temperature 98 1 °F (36 7 °C), resp  rate 20, height 5' 10" (1 778 m), weight 97 4 kg (214 lb 11 7 oz), SpO2 94 %  ,Body mass index is 30 81 kg/m²      Weight (last 2 days)     Date/Time Weight    01/27/22 0600 97 4 (214 73)    01/26/22 0557 97 2 (214 29)    01/25/22 0555 97 1 (214 07)            Intake/Output Summary (Last 24 hours) at 1/27/2022 1443  Last data filed at 1/27/2022 1300  Gross per 24 hour   Intake 300 ml   Output --   Net 300 ml     I/O last 3 completed shifts: In: 600 [P O :600]  Out: 500 [Urine:500]    HD Temporary Double Catheter (Active)   Reasons to continue HD Cath Treatment Therapy 01/27/22 1000   Goal for Removal N/A- chronic HD catheter 01/27/22 1000   Line Necessity Reviewed Yes, reviewed with provider 01/27/22 1000   Site Assessment WDL 01/27/22 1000   Proximal Lumen Status Capped 01/27/22 1000   Distal Lumen Status Cap changed 01/27/22 1000   Line Care Cap changed; Connections checked and tightened 01/24/22 0935   Dressing Type Chlorhexidine dressing 01/27/22 1000   Dressing Status Clean;Dry; Intact 01/27/22 1000   Dressing Change Due 01/29/22 01/27/22 1000       Physical Exam: /62   Pulse 70   Temp 98 1 °F (36 7 °C)   Resp 20   Ht 5' 10" (1 778 m)   Wt 97 4 kg (214 lb 11 7 oz)   SpO2 94%   BMI 30 81 kg/m²     General Appearance:    Alert, cooperative, no distress, appears stated age   Head:    Normocephalic, without obvious abnormality, atraumatic   Eyes:    Conjunctiva/corneas clear   Ears:    Normal external ears   Nose:   Nares normal, septum midline, mucosa normal, no drainage    or sinus tenderness   Throat:   Lips, mucosa, and tongue normal; teeth and gums normal   Neck:   Supple, symmetrical, trachea midline, no adenopathy;        thyroid:  No enlargement/tenderness/nodules; no carotid    bruit or JVD   Back:     Symmetric, no curvature, ROM normal, no CVA tenderness   Lungs:     Clear to auscultation bilaterally, respirations unlabored   Chest wall:    No tenderness or deformity   Heart:    Regular rate and rhythm, S1 and S2 normal, no murmur, rub   or gallop   Abdomen:     Soft, non-tender, bowel sounds active   Extremities:   Extremities normal, atraumatic, no cyanosis or edema   Skin:   Skin color, texture, turgor normal, no rashes or lesions   Lymph nodes:   Cervical normal Neurologic:   CNII-XII intact            Lab, Imaging and other studies: I have personally reviewed pertinent labs  CBC:   Lab Results   Component Value Date    WBC 11 89 (H) 01/27/2022    HGB 10 2 (L) 01/27/2022    HCT 29 8 (L) 01/27/2022    MCV 92 01/27/2022     01/27/2022    MCH 31 4 01/27/2022    MCHC 34 2 01/27/2022    RDW 16 2 (H) 01/27/2022    MPV 9 8 01/27/2022    NRBC 0 01/27/2022     CMP:   Lab Results   Component Value Date    K 3 3 (L) 01/27/2022    CL 91 (L) 01/27/2022    CO2 29 01/27/2022    BUN 61 (H) 01/27/2022    CREATININE 3 89 (H) 01/27/2022    CALCIUM 7 9 (L) 01/27/2022    EGFR 14 01/27/2022         Results from last 7 days   Lab Units 01/27/22  0500 01/26/22  0451 01/25/22  0605 01/22/22  1441 01/22/22  0646 01/21/22  1359 01/21/22  1359   POTASSIUM mmol/L 3 3* 3 3* 3 2*   < > 5 6*   < > 5 4*   CHLORIDE mmol/L 91* 93* 94*   < > 95*   < > 97   CO2 mmol/L 29 27 31   < > 17*   < > 14*   BUN mg/dL 61* 54* 50*   < > 112*   < > 98*   CREATININE mg/dL 3 89* 3 66* 3 60*   < > 5 94*   < > 5 53*   CALCIUM mg/dL 7 9* 8 5 8 1*   < > 9 3   < > 9 1   ALK PHOS U/L  --   --   --   --  48  --  50   ALT U/L  --   --   --   --  163*  --  37   AST U/L  --   --   --   --  148*  --  37    < > = values in this interval not displayed  Phosphorus: No results found for: PHOS  Magnesium: No results found for: MG  Urinalysis: No results found for: Ival Copier, SPECGRAV, PHUR, LEUKOCYTESUR, NITRITE, PROTEINUA, GLUCOSEU, KETONESU, BILIRUBINUR, BLOODU  Ionized Calcium: No results found for: CAION  Coagulation: No results found for: PT, INR, APTT  Troponin: No results found for: TROPONINI  ABG: No results found for: PHART, OOO4NOP, PO2ART, LKT9SLZ, I0GHHMSU, BEART, SOURCE  Radiology review:     IMAGING  Procedure: XR chest portable    Result Date: 1/27/2022  Narrative: CHEST INDICATION:   volume overload   COMPARISON:  January 22, 2022 EXAM PERFORMED/VIEWS:  XR CHEST PORTABLE Single image FINDINGS: Lungs are suboptimally aerated  Bibasilar atelectasis  Partial obscuration of left hemidiaphragm suggesting either small effusion or some consolidation in the left lung base  Cardiac size mildly enlarged  Prominent right hilum as before  Mild vascular prominence without significant peribronchial thickening  No pneumothorax or free air  Right side port with catheter tip in the cavoatrial junction  Right jugular central line tip in the mid SVC  Left cardiac pacer with intact leads  Osseous structures appear within normal limits for patient age  Impression: Chest port and right jugular central line as described  No pneumothorax  Diminished lung volumes with bibasilar atelectasis  Partial obscuration of left hemidiaphragm which may be due to small pleural effusion or consolidation  Mild cardiomegaly with vascular congestion especially in the right hilum probably exaggerated by vascular crowding without pulmonary interstitial edema  Recommend follow-up PA and lateral exam with full inspiration  Workstation performed: [id number]     Procedure: VAS upper limb venous duplex scan, unilateral/limited    Result Date: 1/26/2022  Narrative:  [facility] REPORT CLINICAL: Indications:  Patient presents with left upper extremity edema x 2 - 3 days  No known injury  Pacemaker in the upper left chest  Right IJ dialysis catherter  Operative History: 2020-01-12 Left I and D with removal of sesmoid bone 2020-01-08 Left great toe amputation Pacemaker partial B/L hallux amputation Risk Factors The patient has history of HTN, Diabetes (Yes), Hyperlipidemia, CKD, PAD and previous smoking (quit 5-10yrs ago)  CONCLUSION: Impression RIGHT UPPER LIMB LIMITED: Not scanned due to right upper internal jugular catheter covered in bandages  LEFT UPPER LIMB: No evidence of acute or chronic deep vein thrombosis  No evidence of superficial thrombophlebitis noted   Doppler evaluation shows a normal response to augmentation maneuvers  SIGNATURE: Electronically Signed by: Ziyad White MD, RPVI on 2022-01-26 02:41:09 PM      Current Facility-Administered Medications   Medication Dose Route Frequency    amLODIPine (NORVASC) tablet 10 mg  10 mg Oral Daily    apixaban (ELIQUIS) tablet 5 mg  5 mg Oral Q12H    calcium acetate (PHOSLO) capsule 667 mg  667 mg Oral Daily With Dinner    diphenoxylate-atropine (LOMOTIL) 2 5-0 025 mg per tablet 1 tablet  1 tablet Oral 4x Daily PRN    famotidine (PEPCID) tablet 20 mg  20 mg Oral Daily    fish oil capsule 1,000 mg  1,000 mg Oral BID    insulin lispro (HumaLOG) 100 units/mL subcutaneous injection 1-6 Units  1-6 Units Subcutaneous TID AC    insulin lispro (HumaLOG) 100 units/mL subcutaneous injection 1-6 Units  1-6 Units Subcutaneous HS    metoprolol tartrate (LOPRESSOR) tablet 50 mg  50 mg Oral Q12H    ondansetron (ZOFRAN) injection 4 mg  4 mg Intravenous Q4H PRN    potassium chloride (K-DUR,KLOR-CON) CR tablet 20 mEq  20 mEq Oral Once    prochlorperazine (COMPAZINE) tablet 5 mg  5 mg Oral Q6H PRN    saccharomyces boulardii (FLORASTOR) capsule 250 mg  250 mg Oral BID    sodium chloride 0 9 % infusion  50 mL/hr Intravenous Continuous    tamsulosin (FLOMAX) capsule 0 4 mg  0 4 mg Oral Daily With Dinner     Medications Discontinued During This Encounter   Medication Reason    famotidine (PEPCID) tablet 40 mg Dose adjustment    ondansetron (ZOFRAN) injection 4 mg     bumetanide (BUMEX) injection 2 mg     metoclopramide (REGLAN) injection 10 mg     amLODIPine (NORVASC) tablet 10 mg     sodium bicarbonate tablet 1,300 mg     prochlorperazine (COMPAZINE) tablet 5 mg        Aggie Gibbons MD      This progress note was produced in part using a dictation device which may document imprecise wording from author's original intent

## 2022-01-27 NOTE — ASSESSMENT & PLAN NOTE
· Potasium 6 0 >>4 8>> 3 5 after dialysis, now 3 3    · HD originally planned for yesterday, but held per nephrology, given improvement in labs  · Per nephrology, will not HD permanently   Can be dc'ed tomorrow after cath removal    · Recheck metabolic panel and trend potassium

## 2022-01-27 NOTE — PROGRESS NOTES
[address]  Progress Note Aleks Marks 1955, 74 y o  male MRN: [id number]  Unit/Bed#: MS Dima-Ame Encounter: [id number]  Primary Care Provider: Taty Martinez DO   Date and time admitted to hospital: 1/21/2022  1:36 PM    * Acute on chronic diastolic congestive heart failure ([address] )  Assessment & Plan  · Presented for shortness of breath while receiving fluids at infusion center due to nausea from chemotherapy  · He was placed on CPAP by EMS prior to arrival for increased work of breathing  · Per patient's wife, he is NOT on home O2  Currently requiring 2 L  Will order home o2 study  · EKG:  Paced rhythm  · BNP: 1067  · Chest x-ray: Persistent or recurrent pulmonary vascular congestion persistent cardiomegaly   · He received 40 mg of IV Lasix followed by Flomax 2 mg IV x 2-ultimately required initiation of dialysis  · Daily weights and I&Os  · Cardiology recommendations appreciated- continue current regimen    Acute kidney injury superimposed on chronic kidney disease [facility])  Assessment & Plan  Lab Results   Component Value Date    EGFR 14 01/27/2022    EGFR 16 01/26/2022    EGFR 16 01/25/2022    CREATININE 3 89 (H) 01/27/2022    CREATININE 3 66 (H) 01/26/2022    CREATININE 3 60 (H) 01/25/2022     · History of CKD, recently started chemotherapy for colon cancer with significant worsening of kidney function with hospitalization for creatinine around 0 7  Baseline creatinine level is around 3  · ER PA consulted nephrology who recommended Lasix 80 mg and nephrology consultation  · I&O, daily weights  · Urinary retention protocol  · Nephrology recommendations appreciated   - Dalysis was initiated due to volume overload and electrolyte abnormalities  - Additional dialysis held yesterday due to improving electrolytes and kidney function   - Per nephrology, does not need dialysis permanently  Plan for temporary cath removal tomorrow am, and subsequent discharge home  Hyperkalemia  Assessment & Plan  · Potasium 6 0 >>4 8>> 3 5 after dialysis, now 3 3    · HD originally planned for yesterday, but held per nephrology, given improvement in labs  · Per nephrology, will not HD permanently   Can be dc'ed tomorrow after cath removal    · Recheck metabolic panel and trend potassium      Hyponatremia  Assessment & Plan  · Sodium 127>> 132 after dialysis, now 129 (dialysis held yesterday)  · Recheck am metabolic panel     PAF (paroxysmal atrial fibrillation) ([place] [address] )  Assessment & Plan  · Cardiology consult appreciated  -currently appears to be paced on telemetry with underlying atrial flutter heart rate 60 on telemetry  -continue current anticoagulation with Eliquis 5 mg twice daily  -continue metoprolol tartrate 50 mg twice daily    Diabetic ulcer of left foot [facility])  Assessment & Plan  Lab Results   Component Value Date    HGBA1C 5 7 (H) 12/14/2021       Recent Labs     01/26/22  1053 01/26/22  1554 01/26/22 2058 01/27/22  0611   POCGLU 236* 218* 210* 135       Blood Sugar Average: Last 72 hrs:  (P) 442 [id number]     · Follows up with [address] for 20 years-with skin graft for diabetic foot ulcer  · Wound care consult placed- patient's wife requests Podiatry consultation (pending)    Colon cancer ([place] [address] )  Assessment & Plan  · Adenocarcinoma 01/08/2021 currently being treated with chemotherapy at [facility] center  · Continue outpatient follow-up     Hypertension  Assessment & Plan  · Currently well controlled  · Continue pre-admission amlodipine and metoprolol      SSS (sick sinus syndrome) ([place] [address] )  Assessment & Plan  · s/p [employer] dual-chamber permanent pacemaker  · Continue to monitor      Type 2 diabetes mellitus with chronic kidney disease, with long-term current use of insulin [facility])  Assessment & Plan  Lab Results   Component Value Date    HGBA1C 5 7 (H) 12/14/2021       Recent Labs     01/26/22  1053 01/26/22  1554 01/26/22 2058 01/27/22  0611   POCGLU 236* 218* 210* 135       Blood Sugar Average: Last 72 hrs:  (P) 155 [id number]     · Hold home hypoglycemic agents  · Sliding scale insulin with Accu-Cheks  · Diabetic diet    Uremic acidosis  Assessment & Plan  · Resolved  · AG 22, CO2 14, - improved after dialysis  · Nephrology following  · Serial labs    NICOLASA (obstructive sleep apnea)  Assessment & Plan  · CPAP at bedtime    VTE Pharmacologic Prophylaxis:   Eliquis    Patient Centered Rounds: I performed bedside rounds with nursing staff today  Discussions with Specialists or Other Care Team Provider:  Case Management, nursing, Nephrology    Education and Discussions with Family / Patient: Updated  (wife) via phone  Time Spent for Care: 30 minutes  More than 50% of total time spent on counseling and coordination of care as described above  Current Length of Stay: 6 day(s)  Current Patient Status: Inpatient   Certification Statement: The patient will continue to require additional inpatient hospital stay due to HD temporary catheter removal  Discharge Plan: Anticipate discharge tomorrow to home with home services  Code Status: Level 3 - DNAR and DNI    Subjective:   Patient was seen and examined resting comfortably in bed, in no apparent distress  No acute events overnight  Reports no complaints  Feeling well  Objective:     Vitals:   Temp (24hrs), Av °F (37 2 °C), Min:98 1 °F (36 7 °C), Max:99 8 °F (37 7 °C)    Temp:  [98 1 °F (36 7 °C)-99 8 °F (37 7 °C)] 98 1 °F (36 7 °C)  HR:  [56-70] 70  Resp:  [15-20] 20  BP: (127-156)/(62-74) 127/62  SpO2:  [94 %-98 %] 94 %  Body mass index is 30 81 kg/m²  Input and Output Summary (last 24 hours): Intake/Output Summary (Last 24 hours) at 2022 1553  Last data filed at 2022 1300  Gross per 24 hour   Intake 300 ml   Output --   Net 300 ml       Physical Exam:   Physical Exam  Vitals and nursing note reviewed     Constitutional:       General: He is not in acute distress  Appearance: He is obese  He is not toxic-appearing  HENT:      Head: Normocephalic and atraumatic  Mouth/Throat:      Mouth: Mucous membranes are moist    Eyes:      Conjunctiva/sclera: Conjunctivae normal    Cardiovascular:      Rate and Rhythm: Normal rate and regular rhythm  Pulses: Normal pulses  Heart sounds: Normal heart sounds  Pulmonary:      Effort: Pulmonary effort is normal  No respiratory distress  Breath sounds: Normal breath sounds  No wheezing, rhonchi or rales  Abdominal:      General: Bowel sounds are normal  There is no distension  Palpations: Abdomen is soft  Tenderness: There is no abdominal tenderness  Musculoskeletal:      Left upper arm: Swelling present  Cervical back: Neck supple  Right lower leg: No edema  Left lower leg: No edema  Skin:     General: Skin is warm and dry  Neurological:      General: No focal deficit present  Mental Status: He is alert  Mental status is at baseline  Psychiatric:         Mood and Affect: Mood normal          Behavior: Behavior normal          Thought Content:  Thought content normal           Additional Data:     Labs:  Results from last 7 days   Lab Units 01/27/22  0500   WBC Thousand/uL 11 89*   HEMOGLOBIN g/dL 10 2*   HEMATOCRIT % 29 8*   PLATELETS Thousands/uL 167   NEUTROS PCT % 77*   LYMPHS PCT % 8*   MONOS PCT % 12   EOS PCT % 1     Results from last 7 days   Lab Units 01/27/22  0500 01/22/22  1441 01/22/22  0646   SODIUM mmol/L 129*   < > 127*   POTASSIUM mmol/L 3 3*   < > 5 6*   CHLORIDE mmol/L 91*   < > 95*   CO2 mmol/L 29   < > 17*   BUN mg/dL 61*   < > 112*   CREATININE mg/dL 3 89*   < > 5 94*   ANION GAP mmol/L 9   < > 15*   CALCIUM mg/dL 7 9*   < > 9 3   ALBUMIN g/dL  --   --  3 9   TOTAL BILIRUBIN mg/dL  --   --  1 10*   ALK PHOS U/L  --   --  48   ALT U/L  --   --  163*   AST U/L  --   --  148*   GLUCOSE RANDOM mg/dL 137   < > 166*    < > = values in this interval not displayed       Results from last 7 days   Lab Units 01/21/22  1359   INR  1 77*     Results from last 7 days   Lab Units 01/27/22  1112 01/27/22  0611 01/26/22  2058 01/26/22  1554 01/26/22  1053 01/26/22  0557 01/25/22  1945 01/25/22  1609 01/25/22  1138 01/25/22  0553 01/24/22 2012 01/24/22  0700   POC GLUCOSE mg/dl 193* 135 210* 218* 236* 133 213* 218* 259* 94 178* 158*         Results from last 7 days   Lab Units 01/22/22  1441   LACTIC ACID mmol/L 1 2       Lines/Drains:  Invasive Devices  Report    Central Venous Catheter Line            Port A Cath Right Subclavian -- days          Hemodialysis Catheter            HD Temporary Double Catheter 4 days                Central Line:  Goal for removal: Placed for hemodialysis             Imaging: Reviewed radiology reports from this admission including: Venous duplex left upper extremity    Recent Cultures (last 7 days):         Last 24 Hours Medication List:   Current Facility-Administered Medications   Medication Dose Route Frequency Provider Last Rate    amLODIPine  10 mg Oral Daily Sandra Banegas, CRNP      apixaban  5 mg Oral Q12H Georgie Dear, CRNP      calcium acetate  667 mg Oral Daily With [employer], PAT      diphenoxylate-atropine  1 tablet Oral 4x Daily PRN Sung Bun PAAlexandraC      famotidine  20 mg Oral Daily Georgie Dear, CRNP      fish oil  1,000 mg Oral BID Georgie Dear, CRNP      insulin lispro  1-6 Units Subcutaneous TID AC Georgie Dear, CRNP      insulin lispro  1-6 Units Subcutaneous HS Georgie Dear, CRNP      metoprolol tartrate  50 mg Oral Q12H Georgie Dear, CRNP      ondansetron  4 mg Intravenous Q4H PRN PAT Kyle      prochlorperazine  5 mg Oral Q6H PRN JAYME Marcos-ANNIE      saccharomyces boulardii  250 mg Oral BID Sung Bun, PA-C      sodium chloride  50 mL/hr Intravenous Continuous Tricia Ocampo MD 50 mL/hr (01/27/22 6269)    tamsulosin  0 4 mg Oral Daily With [facility]          Today, Patient Was Seen By: Marcos Musa PA-C    **Please Note: This note may have been constructed using a voice recognition system  **

## 2022-01-27 NOTE — ASSESSMENT & PLAN NOTE
· Adenocarcinoma 01/08/2021 currently being treated with chemotherapy at [facility] center  · Continue outpatient follow-up

## 2022-01-27 NOTE — RESPIRATORY THERAPY NOTE
01/27/22 0115   Respiratory Assessment   Assessment Type Assess only   General Appearance Alert; Awake   Respiratory Pattern Assisted   Chest Assessment Chest expansion symmetrical   Bilateral Breath Sounds Clear;Diminished   Resp Comments pt awake but ready for cpap hs placed on cpap sancho well    O2 Device cpap w/o2    Non-Invasive Information   O2 Interface Device Full face mask   Non-Invasive Ventilation Mode CPAP   $ Intermittent NIV Yes   SpO2 96 %   $ Pulse Oximetry Spot Check Charge Completed   Non-Invasive Settings   FiO2 (%)   (2)   Flow (lpm) 2   PEEP/CPAP (cm H2O) 11   Non-Invasive Readings   Skin Intervention Skin intact   Total Rate 16   Spontaneous Vt (mL) 590   I/E Ratio (Obs) 1:1 6   Leak (lpm) 34   Non-Invasive Alarms   Insp Pressure Low (cm H20) 4   Vt Low (mL) 100   High Resp Rate (BPM) 40 BPM   Low Resp Rate (BPM) 4 BPM   Apnea Interval (sec) 30

## 2022-01-27 NOTE — ASSESSMENT & PLAN NOTE
Lab Results   Component Value Date    HGBA1C 5 7 (H) 12/14/2021       Recent Labs     01/26/22  1053 01/26/22  1554 01/26/22 2058 01/27/22  0611   POCGLU 236* 218* 210* 135       Blood Sugar Average: Last 72 hrs:  (P) 497 [id number]     · Follows up with [facility] for 20 years-with skin graft for diabetic foot ulcer  · Wound care consult placed- patient's wife requests Podiatry consultation (pending)

## 2022-01-27 NOTE — ASSESSMENT & PLAN NOTE
· Presented for shortness of breath while receiving fluids at infusion center due to nausea from chemotherapy  · He was placed on CPAP by EMS prior to arrival for increased work of breathing  · Per patient's wife, he is NOT on home O2  Currently requiring 2 L  Will order home o2 study     · EKG:  Paced rhythm  · BNP: 1067  · Chest x-ray: Persistent or recurrent pulmonary vascular congestion persistent cardiomegaly   · He received 40 mg of IV Lasix followed by Flomax 2 mg IV x 2-ultimately required initiation of dialysis  · Daily weights and I&Os  · Cardiology recommendations appreciated- continue current regimen

## 2022-01-28 VITALS
RESPIRATION RATE: 18 BRPM | WEIGHT: 209.55 LBS | DIASTOLIC BLOOD PRESSURE: 76 MMHG | OXYGEN SATURATION: 92 % | SYSTOLIC BLOOD PRESSURE: 150 MMHG | HEART RATE: 60 BPM | TEMPERATURE: 98.5 F | HEIGHT: 70 IN | BODY MASS INDEX: 30 KG/M2

## 2022-01-28 LAB
ANION GAP SERPL CALCULATED.3IONS-SCNC: 10 MMOL/L (ref 4–13)
BUN SERPL-MCNC: 59 MG/DL (ref 5–25)
CALCIUM SERPL-MCNC: 8.2 MG/DL (ref 8.4–10.2)
CHLORIDE SERPL-SCNC: 93 MMOL/L (ref 96–108)
CO2 SERPL-SCNC: 26 MMOL/L (ref 21–32)
CREAT SERPL-MCNC: 3.51 MG/DL (ref 0.6–1.3)
DME PARACHUTE DELIVERY DATE ACTUAL: NORMAL
DME PARACHUTE DELIVERY DATE EXPECTED: NORMAL
DME PARACHUTE DELIVERY DATE REQUESTED: NORMAL
DME PARACHUTE ITEM DESCRIPTION: NORMAL
DME PARACHUTE ORDER STATUS: NORMAL
DME PARACHUTE SUPPLIER NAME: NORMAL
DME PARACHUTE SUPPLIER PHONE: NORMAL
GFR SERPL CREATININE-BSD FRML MDRD: 16 ML/MIN/1.73SQ M
GLUCOSE SERPL-MCNC: 123 MG/DL (ref 65–140)
GLUCOSE SERPL-MCNC: 160 MG/DL (ref 65–140)
GLUCOSE SERPL-MCNC: 183 MG/DL (ref 65–140)
POTASSIUM SERPL-SCNC: 3.3 MMOL/L (ref 3.5–5.3)
SODIUM SERPL-SCNC: 129 MMOL/L (ref 135–147)

## 2022-01-28 PROCEDURE — 99232 SBSQ HOSP IP/OBS MODERATE 35: CPT | Performed by: INTERNAL MEDICINE

## 2022-01-28 PROCEDURE — 80048 BASIC METABOLIC PNL TOTAL CA: CPT | Performed by: INTERNAL MEDICINE

## 2022-01-28 PROCEDURE — 94660 CPAP INITIATION&MGMT: CPT

## 2022-01-28 PROCEDURE — 99239 HOSP IP/OBS DSCHRG MGMT >30: CPT

## 2022-01-28 PROCEDURE — 94760 N-INVAS EAR/PLS OXIMETRY 1: CPT

## 2022-01-28 PROCEDURE — 82948 REAGENT STRIP/BLOOD GLUCOSE: CPT

## 2022-01-28 RX ADMIN — INSULIN LISPRO 1 UNITS: 100 INJECTION, SOLUTION INTRAVENOUS; SUBCUTANEOUS at 12:38

## 2022-01-28 RX ADMIN — FAMOTIDINE 20 MG: 20 TABLET ORAL at 08:14

## 2022-01-28 RX ADMIN — INSULIN LISPRO 1 UNITS: 100 INJECTION, SOLUTION INTRAVENOUS; SUBCUTANEOUS at 07:35

## 2022-01-28 RX ADMIN — METOPROLOL TARTRATE 50 MG: 50 TABLET, FILM COATED ORAL at 05:28

## 2022-01-28 RX ADMIN — AMLODIPINE BESYLATE 10 MG: 10 TABLET ORAL at 08:14

## 2022-01-28 RX ADMIN — OMEGA-3 FATTY ACIDS CAP 1000 MG 1000 MG: 1000 CAP at 08:14

## 2022-01-28 RX ADMIN — Medication 250 MG: at 08:14

## 2022-01-28 RX ADMIN — APIXABAN 5 MG: 5 TABLET, FILM COATED ORAL at 05:28

## 2022-01-28 NOTE — DISCHARGE SUMMARY
[address]  Discharge- Emani Speaker 1955, 74 y o  male MRN: [id number]  Unit/Bed#: -Ame Encounter: [id number]  Primary Care Provider: Saulo Elias DO   Date and time admitted to hospital: 1/21/2022  1:36 PM    * Acute on chronic diastolic congestive heart failure ([address] )  Assessment & Plan  · Presented for shortness of breath while receiving fluids at infusion center due to nausea from chemotherapy  · He was placed on CPAP by EMS prior to arrival for increased work of breathing  · EKG:  Paced rhythm  · BNP: 1067  · Chest x-ray: Persistent or recurrent pulmonary vascular congestion persistent cardiomegaly   · He received 40 mg of IV Lasix followed by Flomax 2 mg IV x 2-ultimately required initiation of dialysis; completed 2 sessions  · Cardiology recommendations appreciated- continue current regimen  · Per patient's wife, he is NOT on home O2  Currently requiring 2 L  Desat study ordered- qualified for 2L  Appreciate CM for coordination  · Medically cleared for discharge    Acute kidney injury superimposed on chronic kidney disease [facility])  Assessment & Plan  Lab Results   Component Value Date    EGFR 16 01/28/2022    EGFR 14 01/27/2022    EGFR 16 01/26/2022    CREATININE 3 51 (H) 01/28/2022    CREATININE 3 89 (H) 01/27/2022    CREATININE 3 66 (H) 01/26/2022     · History of CKD, recently started chemotherapy for colon cancer with significant worsening of kidney function with hospitalization for creatinine around 0 7  Baseline creatinine level is around 3   · Nephrology consult appreciated   - Underwent 2 dialysis sessions, with kidney function return to patient's baseline     - Cleared for dc from nephrology standpoint  Close follow-up with patient's outpatient nephrologist Dr Fabien Gonzales  · Medically cleared for discharge    Hyperkalemia  Assessment & Plan  · Potasium 6 0 >>4 8>> 3 5 after dialysis, now 3 3  · See related plan above     · Received 20 meq oral K prior to dc, per nephrology  Hyponatremia  Assessment & Plan  · Sodium 127>> 132 after dialysis, now 129  · Per Nephrology, brittany for discharge with close outpatient follow-up with patient's regular nephrologist Dr Davide SOLO (paroxysmal atrial fibrillation) ([place] [id number] )  Assessment & Plan  · Cardiology consult appreciated  -discharge on pre-admission current anticoagulation with Eliquis 5 mg twice daily  -discharged on pre-admission metoprolol tartrate 50 mg twice daily    Diabetic ulcer of left foot [facility])  Assessment & Plan  Lab Results   Component Value Date    HGBA1C 5 7 (H) 12/14/2021       Recent Labs     01/27/22  1635 01/27/22  1919 01/28/22  0602 01/28/22  1128   POCGLU 229* 183* 160* 183*       Blood Sugar Average: Last 72 hrs:  (P) 075 [id number]     · Follows up with [facility] for 20 years-with skin graft for diabetic foot ulcer  · Wound care consult placed; however, discharged prior to specialist arrival  Patient agreeable to continued outpatient follow-up       Colon cancer [facility])  Assessment & Plan  · Adenocarcinoma 01/08/2021 currently being treated with chemotherapy at [facility] center  · Continue outpatient follow-up     Hypertension  Assessment & Plan  · Well controlled  · Discharge on pre-admission amlodipine and metoprolol      SSS (sick sinus syndrome) ([place] [address] )  Assessment & Plan  · s/p [employer] dual-chamber permanent pacemaker  · Continue regular outpatient follow-up      Type 2 diabetes mellitus with chronic kidney disease, with long-term current use of insulin [facility])  Assessment & Plan  Lab Results   Component Value Date    HGBA1C 5 7 (H) 12/14/2021       Recent Labs     01/27/22  1635 01/27/22  1919 01/28/22  0602 01/28/22  1128   POCGLU 229* 183* 160* 183*       Blood Sugar Average: Last 72 hrs:  (P) 190 [id number]     · Discharged on all pre-admission diabetic agents    Uremic acidosis  Assessment & Plan  · Resolved  · AG 22, CO2 14, - improved after dialysis  · Continue regular outpatient Nephrology follow-up    NICOLASA (obstructive sleep apnea)  Assessment & Plan  · CPAP at bedtime      Medical Problems             Resolved Problems  Date Reviewed: 1/28/2022    None              Discharging Physician / Practitioner: Roni Jane PA-C  PCP: Tyler Salomon DO  Admission Date:   Admission Orders (From admission, onward)     Ordered        01/21/22 1457  Inpatient Admission  Once                      Discharge Date: 01/28/22    Consultations During Hospital Stay:  · Nephrology  · Cardiology  · Respiratory  · PT/OT  · Case management    Procedures Performed:   · Right internal jugular temporary hemodialysis catheter placement  · Hemodialysis session on 01/23/2022  · Hemodialysis session on 01/24/2022    Significant Findings / Test Results:   · Chest x-ray:  Persistent or recurrent pulmonary vascular congestion  Persistent cardiomegaly  · Chest x-ray: Moderate pulmonary edema with trace effusions  · Ultrasound kidney/bladder:  Urinary bladder volume 95 cc  Unable to measure postvoid residual as the patient reported prior to the examination via self catheterization  Small pelvic free fluid  · Chest x-ray:   · Chest port and right jugular central line as described  No pneumothorax  · Diminished lung volumes with bibasilar atelectasis  Partial obscuration of left hemidiaphragm which may be due to small pleural effusion or consolidation  · Mild cardiomegaly with vascular congestion especially in the right hilum probably exaggerated by vascular crowding without pulmonary interstitial edema  Incidental Findings:   · None     Test Results Pending at Discharge (will require follow up):    · None     Outpatient Tests Requested:  · CMP    Complications:  None    Reason for Admission:  Acute on chronic diastolic congestive heart failure    Hospital Course:   Trevor English is a 74 y o  male patient with a past medical history significant for colon cancer currently undergoing chemotherapy, heart failure, VTE, NICOLASA on CPAP at bedtime, sick sinus syndrome with pacer in place, CKD, left great toe amputation, diabetic neuropathy, who originally presented to the hospital on 1/21/2022 due to dyspnea and acute kidney injury  Please refer to the initial history and physical as outlined by Deloris Frederick on 01/21/2022 for additional presenting features  Patient was seen by nephrology, and found to have acute kidney injury complicated by volume overload with pulmonary edema and hyponatremia, as well as hyperkalemia, hyperphosphatemia, and elevated anion gap metabolic acidosis  He required 2 hemodialysis sessions after temporary catheter placement, IV Bumex, and high dose sodium bicarbonate tablets, with resolution of electrolyte imbalances, acidosis, and return to patient's baseline kidney function  Patient was also seen by PT/OT who recommended HHC, which was set up by CM  O2 desaturation study qualified patient for 2 L of home O2, which was also set up by [facility]  Follow-up CMP ordered for patient to be completed after discharge, and he was instructed to continue regular follow-up with his outpatient nephrologist   Of note, patient experienced left upper extremity swelling that was not present at the time of initial presentation  Vascular duplex study was unremarkable  Patient was medically cleared for discharge on 01/28/2022  This is a brief discharge summary; please refer to the above assessment/plan as well as the remainder of the patient's medical history for further details  Please see above list of diagnoses and related plan for additional information  Condition at Discharge: good    Discharge Day Visit / Exam:   Subjective:  Patient was seen and examined resting comfortably in bed, no apparent distress  Patient's wife was present at the time of the encounter  No acute events overnight  Offers no complaints, in eager to go home    Vitals: Blood Pressure: 150/76 (01/28/22 0810)  Pulse: 60 (01/28/22 0810)  Temperature: 98 5 °F (36 9 °C) (01/28/22 0810)  Temp Source: Oral (01/26/22 2251)  Respirations: 18 (01/28/22 0810)  Height: 5' 10" (177 8 cm) (01/24/22 1308)  Weight - Scale: 95 1 kg (209 lb 8 8 oz) (01/28/22 0535)  SpO2: 92 % (01/28/22 0810)  Exam:   Physical Exam  Vitals and nursing note reviewed  Constitutional:       General: He is not in acute distress  Appearance: He is obese  He is ill-appearing (Chronically)  He is not toxic-appearing  HENT:      Head: Normocephalic and atraumatic  Mouth/Throat:      Mouth: Mucous membranes are moist    Eyes:      Conjunctiva/sclera: Conjunctivae normal    Cardiovascular:      Rate and Rhythm: Normal rate and regular rhythm  Pulses: Normal pulses  Heart sounds: Normal heart sounds  Pulmonary:      Effort: Pulmonary effort is normal  No respiratory distress  Breath sounds: Normal breath sounds  No wheezing, rhonchi or rales  Abdominal:      General: Bowel sounds are normal  There is no distension  Palpations: Abdomen is soft  Tenderness: There is no abdominal tenderness  Musculoskeletal:      Left upper arm: Swelling present  No tenderness  Cervical back: Neck supple  Right lower leg: No edema  Left lower leg: No edema  Skin:     General: Skin is warm and dry  Neurological:      General: No focal deficit present  Mental Status: He is alert  Mental status is at baseline  Psychiatric:         Mood and Affect: Mood normal          Behavior: Behavior normal          Thought Content: Thought content normal           Discussion with Family: Updated  (wife) at bedside  Discharge instructions/Information to patient and family:   See after visit summary for information provided to patient and family  Provisions for Follow-Up Care:  See after visit summary for information related to follow-up care and any pertinent home health orders         Disposition:   Home with [facility] (Reminder: Complete face to face encounter)    Planned Readmission:  None     Discharge Statement:  I spent 70 minutes discharging the patient  This time was spent on the day of discharge  I had direct contact with the patient on the day of discharge  Greater than 50% of the total time was spent examining patient, answering all patient questions, arranging and discussing plan of care with patient as well as directly providing post-discharge instructions  Additional time then spent on discharge activities  Discharge Medications:  See after visit summary for reconciled discharge medications provided to patient and/or family        **Please Note: This note may have been constructed using a voice recognition system**

## 2022-01-28 NOTE — RESPIRATORY THERAPY NOTE
01/28/22 0000   Respiratory Assessment   Assessment Type Assess only   General Appearance Awake; Alert   Respiratory Pattern Dyspnea with exertion   Chest Assessment Chest expansion symmetrical   Bilateral Breath Sounds Clear;Diminished   Resp Comments pt ready for cpap placed on w/ 2 l/m for hs    O2 Device cpap w/ o2    Non-Invasive Information   O2 Interface Device Full face mask  (small )   Non-Invasive Ventilation Mode CPAP  (vivo )   $ Intermittent NIV Yes   SpO2 95 %   $ Pulse Oximetry Spot Check Charge Completed   Non-Invasive Settings   Flow (lpm) 2   PEEP/CPAP (cm H2O) 11   Non-Invasive Readings   Skin Intervention Skin intact   Total Rate 21   Spontaneous Vt (mL) 550   I/E Ratio (Obs) 1:1 2   Leak (lpm) 30   Non-Invasive Alarms   Insp Pressure Low (cm H20) 4   Vt Low (mL) 100   High Resp Rate (BPM) 40 BPM   Low Resp Rate (BPM) 4 BPM   Apnea Interval (sec) 30

## 2022-01-28 NOTE — NURSING NOTE
Permacath right IJ removed at this time  Pressure applied to site for 20 min as per order  Covered insertion site with 2x2 and secured with a tegaderm  No bleeding noted  Explained complications to pt  Verbalized understanding

## 2022-01-28 NOTE — ASSESSMENT & PLAN NOTE
· Sodium 127>> 132 after dialysis, now 129  · Per Nephrology, okay for discharge with close outpatient follow-up with patient's regular nephrologist Dr Nettie Holland

## 2022-01-28 NOTE — NURSING NOTE
Patient discharged to home with [facility]  All belongings returned to patient upon discharge  Patient and spouse verbalized understanding of discharge instructions

## 2022-01-28 NOTE — ASSESSMENT & PLAN NOTE
Lab Results   Component Value Date    EGFR 16 01/28/2022    EGFR 14 01/27/2022    EGFR 16 01/26/2022    CREATININE 3 51 (H) 01/28/2022    CREATININE 3 89 (H) 01/27/2022    CREATININE 3 66 (H) 01/26/2022     · History of CKD, recently started chemotherapy for colon cancer with significant worsening of kidney function with hospitalization for creatinine around 0 7  Baseline creatinine level is around 3   · Nephrology consult appreciated   - Underwent 2 dialysis sessions, with kidney function return to patient's baseline     - Cleared for dc from nephrology standpoint  Close follow-up with patient's outpatient nephrologist Dr Lindsay Santana     · Medically cleared for discharge

## 2022-01-28 NOTE — PLAN OF CARE
Problem: Nutrition/Hydration-ADULT  Goal: Nutrient/Hydration intake appropriate for improving, restoring or maintaining nutritional needs  Description: Monitor and assess patient's nutrition/hydration status for malnutrition  Collaborate with interdisciplinary team and initiate plan and interventions as ordered  Monitor patient's weight and dietary intake as ordered or per policy  Utilize nutrition screening tool and intervene as necessary  Determine patient's food preferences and provide high-protein, high-caloric foods as appropriate       INTERVENTIONS:  - Monitor oral intake, urinary output, labs, and treatment plans  - Assess nutrition and hydration status and recommend course of action  - Evaluate amount of meals eaten  - Assist patient with eating if necessary   - Allow adequate time for meals  - Recommend/ encourage appropriate diets, oral nutritional supplements, and vitamin/mineral supplements  - Order, calculate, and assess calorie counts as needed  - Recommend, monitor, and adjust tube feedings and TPN/PPN based on assessed needs  - Assess need for intravenous fluids  - Provide specific nutrition/hydration education as appropriate  - Include patient/family/caregiver in decisions related to nutrition  Outcome: Progressing     Problem: RESPIRATORY - ADULT  Goal: Achieves optimal ventilation and oxygenation  Description: INTERVENTIONS:  - Assess for changes in respiratory status  - Assess for changes in mentation and behavior  - Position to facilitate oxygenation and minimize respiratory effort  - Oxygen administered by appropriate delivery if ordered  - Initiate smoking cessation education as indicated  - Encourage broncho-pulmonary hygiene including cough, deep breathe, Incentive Spirometry  - Assess the need for suctioning and aspirate as needed  - Assess and instruct to report SOB or any respiratory difficulty  - Respiratory Therapy support as indicated  Outcome: Progressing     Problem: GASTROINTESTINAL - ADULT  Goal: Minimal or absence of nausea and/or vomiting  Description: INTERVENTIONS:  - Administer IV fluids if ordered to ensure adequate hydration  - Maintain NPO status until nausea and vomiting are resolved  - Nasogastric tube if ordered  - Administer ordered antiemetic medications as needed  - Provide nonpharmacologic comfort measures as appropriate  - Advance diet as tolerated, if ordered  - Consider nutrition services referral to assist patient with adequate nutrition and appropriate food choices  Outcome: Progressing  Goal: Maintains or returns to baseline bowel function  Description: INTERVENTIONS:  - Assess bowel function  - Encourage oral fluids to ensure adequate hydration  - Administer IV fluids if ordered to ensure adequate hydration  - Administer ordered medications as needed  - Encourage mobilization and activity  - Consider nutritional services referral to assist patient with adequate nutrition and appropriate food choices  Outcome: Progressing  Goal: Maintains adequate nutritional intake  Description: INTERVENTIONS:  - Monitor percentage of each meal consumed  - Identify factors contributing to decreased intake, treat as appropriate  - Assist with meals as needed  - Monitor I&O, weight, and lab values if indicated  - Obtain nutrition services referral as needed  Outcome: Progressing  Goal: Oral mucous membranes remain intact  Description: INTERVENTIONS  - Assess oral mucosa and hygiene practices  - Implement preventative oral hygiene regimen  - Implement oral medicated treatments as ordered  - Initiate Nutrition services referral as needed  Outcome: Progressing     Problem: PAIN - ADULT  Goal: Verbalizes/displays adequate comfort level or baseline comfort level  Description: Interventions:  - Encourage patient to monitor pain and request assistance  - Assess pain using appropriate pain scale  - Administer analgesics based on type and severity of pain and evaluate response  - Implement non-pharmacological measures as appropriate and evaluate response  - Consider cultural and social influences on pain and pain management  - Notify physician/advanced practitioner if interventions unsuccessful or patient reports new pain  Outcome: Progressing     Problem: INFECTION - ADULT  Goal: Absence or prevention of progression during hospitalization  Description: INTERVENTIONS:  - Assess and monitor for signs and symptoms of infection  - Monitor lab/diagnostic results  - Monitor all insertion sites, i e  indwelling lines, tubes, and drains  - Monitor endotracheal if appropriate and nasal secretions for changes in amount and color  - [place] appropriate cooling/warming therapies per order  - Administer medications as ordered  - Instruct and encourage patient and family to use good hand hygiene technique  - Identify and instruct in appropriate isolation precautions for identified infection/condition  Outcome: Progressing     Problem: SAFETY ADULT  Goal: Patient will remain free of falls  Description: INTERVENTIONS:  - Educate patient/family on patient safety including physical limitations  - Instruct patient to call for assistance with activity   - Consult OT/PT to assist with strengthening/mobility   - Keep Call bell within reach  - Keep bed low and locked with side rails adjusted as appropriate  - Keep care items and personal belongings within reach  - Initiate and maintain comfort rounds  - Make Fall Risk Sign visible to staff  - Offer Toileting every 2 Hours, in advance of need  - Apply yellow socks and bracelet for high fall risk patients  - Consider moving patient to room near nurses station  Outcome: Progressing  Goal: Maintain or return to baseline ADL function  Description: INTERVENTIONS:  -  Assess patient's ability to carry out ADLs; assess patient's baseline for ADL function and identify physical deficits which impact ability to perform ADLs (bathing, care of mouth/teeth, toileting, grooming, dressing, etc )  - Assess/evaluate cause of self-care deficits   - Assess range of motion  - Assess patient's mobility; develop plan if impaired  - Assess patient's need for assistive devices and provide as appropriate  - Encourage maximum independence but intervene and supervise when necessary  - Involve family in performance of ADLs  - Assess for home care needs following discharge   - Consider OT consult to assist with ADL evaluation and planning for discharge  - Provide patient education as appropriate  Outcome: Progressing  Goal: Maintains/Returns to pre admission functional level  Description: INTERVENTIONS:  - Perform BMAT or MOVE assessment daily    - Set and communicate daily mobility goal to care team and patient/family/caregiver  - Collaborate with rehabilitation services on mobility goals if consulted  - Out of bed for toileting  - Record patient progress and toleration of activity level   Outcome: Progressing     Problem: DISCHARGE PLANNING  Goal: Discharge to home or other facility with appropriate resources  Description: INTERVENTIONS:  - Identify barriers to discharge w/patient and caregiver  - Arrange for needed discharge resources and transportation as appropriate  - Identify discharge learning needs (meds, wound care, etc )  - Arrange for interpretive services to assist at discharge as needed  - Refer to Case Management Department for coordinating discharge planning if the patient needs post-hospital services based on physician/advanced practitioner order or complex needs related to functional status, cognitive ability, or social support system  Outcome: Progressing     Problem: Knowledge Deficit  Goal: Patient/family/caregiver demonstrates understanding of disease process, treatment plan, medications, and discharge instructions  Description: Complete learning assessment and assess knowledge base    Interventions:  - Provide teaching at level of understanding  - Provide teaching via preferred learning methods  Outcome: Progressing     Problem: Prexisting or High Potential for Compromised Skin Integrity  Goal: Skin integrity is maintained or improved  Description: INTERVENTIONS:  - Identify patients at risk for skin breakdown  - Assess and monitor skin integrity  - Assess and monitor nutrition and hydration status  - Monitor labs   - Assess for incontinence   - Turn and reposition patient  - Assist with mobility/ambulation  - Relieve pressure over bony prominences  - Avoid friction and shearing  - Provide appropriate hygiene as needed including keeping skin clean and dry  - Evaluate need for skin moisturizer/barrier cream  - Collaborate with interdisciplinary team   - Patient/family teaching  - Consider wound care consult   Outcome: Progressing     Problem: MOBILITY - ADULT  Goal: Maintain or return to baseline ADL function  Description: INTERVENTIONS:  -  Assess patient's ability to carry out ADLs; assess patient's baseline for ADL function and identify physical deficits which impact ability to perform ADLs (bathing, care of mouth/teeth, toileting, grooming, dressing, etc )  - Assess/evaluate cause of self-care deficits   - Assess range of motion  - Assess patient's mobility; develop plan if impaired  - Assess patient's need for assistive devices and provide as appropriate  - Encourage maximum independence but intervene and supervise when necessary  - Involve family in performance of ADLs  - Assess for home care needs following discharge   - Consider OT consult to assist with ADL evaluation and planning for discharge  - Provide patient education as appropriate  Outcome: Progressing  Goal: Maintains/Returns to pre admission functional level  Description: INTERVENTIONS:  - Perform BMAT or MOVE assessment daily    - Set and communicate daily mobility goal to care team and patient/family/caregiver     - Collaborate with rehabilitation services on mobility goals if consulted  - Out of bed for toileting  - Record patient progress and toleration of activity level   Outcome: Progressing     Problem: Potential for Falls  Goal: Patient will remain free of falls  Description: INTERVENTIONS:  - Educate patient/family on patient safety including physical limitations  - Instruct patient to call for assistance with activity   - Consult OT/PT to assist with strengthening/mobility   - Keep Call bell within reach  - Keep bed low and locked with side rails adjusted as appropriate  - Keep care items and personal belongings within reach  - Initiate and maintain comfort rounds  - Make Fall Risk Sign visible to staff  - Offer Toileting every 2 Hours, in advance of need  - Apply yellow socks and bracelet for high fall risk patients  - Consider moving patient to room near nurses station  Outcome: Progressing     Problem: METABOLIC, FLUID AND ELECTROLYTES - ADULT  Goal: Electrolytes maintained within normal limits  Description: INTERVENTIONS:  - Monitor labs and assess patient for signs and symptoms of electrolyte imbalances  - Administer electrolyte replacement as ordered  - Monitor response to electrolyte replacements, including repeat lab results as appropriate  - Instruct patient on fluid and nutrition as appropriate  Outcome: Progressing  Goal: Fluid balance maintained  Description: INTERVENTIONS:  - Monitor labs   - Monitor I/O and WT  - Instruct patient on fluid and nutrition as appropriate  - Assess for signs & symptoms of volume excess or deficit  Outcome: Progressing

## 2022-01-28 NOTE — ASSESSMENT & PLAN NOTE
· Resolved  · AG 22, CO2 14, - improved after dialysis  · Continue regular outpatient Nephrology follow-up

## 2022-01-28 NOTE — DISCHARGE INSTR - AVS FIRST PAGE
Dear Jes Yoder,     It was our [facility] to care [facility]  It is our hope that we were always able to exceed the expected standards for your care during your stay  You were hospitalized due to acute on chronic diastolic congestive heart failure  You were cared for on the medical/surgical floor by Rolando Biggs PA-C under the service of [place], * with the [facility] who covers for your primary care physician (PCP), Greg Vincent DO, while you were hospitalized  If you have any questions or concerns related to this hospitalization, you may contact us at [phone]  For follow up as well as any medication refills, we recommend that you follow up with your primary care physician  A registered nurse will reach out to you by phone within a few days after your discharge to answer any additional questions that you may have after going home  However, at this time we provide for you here, the most important instructions / recommendations at discharge:     Notable Medication Adjustments -   ***  Testing Required after Discharge -   ***  Important follow up information -   ***  Other Instructions -   ***  Please review this entire after visit summary as additional general instructions including medication list, appointments, activity, diet, any pertinent wound care, and other additional recommendations from your care team that may be provided for you        Sincerely,     Rolando Biggs PA-C

## 2022-01-28 NOTE — CASE MANAGEMENT
Case Management Progress Note    Patient name Guillermina Fly  Location [facility] [id number] MRN [id number]  : 1955 Date 2022       LOS (days): 7  Geometric Mean LOS (GMLOS) (days): 4 30  Days to GMLOS:-2 7        OBJECTIVE:  Bundled Patient Payment: No Current Bundle     Current admission status: Inpatient  Preferred Pharmacy:   [id number] [facility] [id number]  [address]  Phone: [phone] Fax: [phone]    Primary Care Provider: Courtney Mcintosh DO    Primary Insurance: MEDICARE  Secondary Insurance: AARP    PROGRESS NOTE:    Pt is an unplanned admission pcp appointment was set up for  at 12:30pm   Cm was made aware by anna to give the pt tanks from the consignment closet, 2 tanks were given to the pt and wife,   Pt and family are in agreement with the d/c plan

## 2022-01-28 NOTE — ASSESSMENT & PLAN NOTE
Lab Results   Component Value Date    HGBA1C 5 7 (H) 12/14/2021       Recent Labs     01/27/22  1635 01/27/22  1919 01/28/22  0602 01/28/22  1128   POCGLU 229* 183* 160* 183*       Blood Sugar Average: Last 72 hrs:  (P) 548 [id number]     · Discharged on all pre-admission diabetic agents

## 2022-01-28 NOTE — ASSESSMENT & PLAN NOTE
Lab Results   Component Value Date    HGBA1C 5 7 (H) 12/14/2021       Recent Labs     01/27/22  1635 01/27/22  1919 01/28/22  0602 01/28/22  1128   POCGLU 229* 183* 160* 183*       Blood Sugar Average: Last 72 hrs:  (P) 271 [id number]     · Follows up with [facility] for 20 years-with skin graft for diabetic foot ulcer  · Wound care consult placed; however, discharged prior to specialist arrival  Patient agreeable to continued outpatient follow-up

## 2022-01-28 NOTE — ASSESSMENT & PLAN NOTE
· Adenocarcinoma 01/08/2021 currently being treated with chemotherapy at [facility] center  · Continue outpatient follow-up

## 2022-01-28 NOTE — PROGRESS NOTES
Progress Note - Nephrology   Rich Rider 74 y o  male MRN: [id number]  Unit/Bed#: -01 Encounter: [id number]    A/P:  1  Hemodialysis requiring acute kidney injury superimposed on chronic kidney disease stage 4   - was admitted at the chemotherapy which produced severe nausea and vomiting  He was treated with IV fluids and developed marked fluid overload requiring admission for dialysis   - he was treated with 2 dialysis says sessions and had stability back to his baseline   - creatinine today is 3 51 mg/dL which is improved from 3 89 mg/dL yesterday) DC EGFR 16 mils per minute   - nonoliguric 4324 ml and voiding freely   - temporary hemodialysis catheter will be removed  Nurse instructed to hold pressure as he is on Eliquis until no bleeding is noted and a compression bandage will be applied   - he will follow-up with his primary nephrologist Dr Pippa Toledo    2  Hyponatremia and hypokalemia   - will administer 1 dose Of K Dur    - labs ordered for 1 week    3  Pulmonary edema   - resolved     4  High anion gap metabolic acidosis   - resolved    5  Add no CA of the colon   - he will discuss treatment options with his oncologist    6   Left arm edema - PAS negative for acute or chronic DVT or superficial from both phlebitis    Follow up reason for today's visit: Acute kidney injury hemodialysis requiring    Acute on chronic diastolic congestive heart failure [facility])    Patient Active Problem List   Diagnosis    Bilateral leg edema    Diabetic ulcer of left foot (HCC)    Easy bruising    Eczema    Erectile dysfunction of non-organic origin    Gout    Hyperlipidemia    Uremic acidosis    Arthritis    Peripheral arterial disease (HCC)    PVCs (premature ventricular contractions)    Rhinitis    Systolic murmur    Vertigo    Complete heart block (HCC)    Metabolic acidosis    Acute kidney injury ([address] )    Other hyperlipidemia    PAF (paroxysmal atrial fibrillation) ([facility]    Persistent proteinuria    Volume overload    Urinary retention    NICOLASA (obstructive sleep apnea)    Type 2 diabetes mellitus with chronic kidney disease, with long-term current use of insulin (HCC)    Hypertension    Stage 4 chronic kidney disease (HCC)    Nonrheumatic aortic valve stenosis    Anemia    Fever    Mitral valve stenosis    Abnormal CT of the chest    Hyperkalemia    Acute pulmonary edema (HCC)    Chronic diastolic (congestive) heart failure (HCC)    Left renal artery stenosis (HCC)    S/P amputation of lesser toe, left (HCC)    S/P amputation of lesser toe, right (HCC)    Elevated troponin I level    Staphylococcus aureus bacteremia    Type 2 diabetes mellitus with diabetic neuropathy, without long-term current use of insulin (HCC)    Hyponatremia    Iron deficiency anemia    Anxiety    Osteomyelitis of left foot (HCC)    Amputation of left great toe (HCC)    Multiple drug resistant organism (MDRO) culture positive    Renovascular hypertension    SSS (sick sinus syndrome) (HCC)    Chronic obstructive pulmonary disease (HCC)    Absence of toe (HCC)    Chronic painful diabetic neuropathy (HCC)    Onychomycosis    Colon cancer (HCC)    Acute kidney injury superimposed on chronic kidney disease (HCC)    RSV (respiratory syncytial virus pneumonia)    Chemotherapy-induced neutropenia (HCC)    Nausea    Acute on chronic diastolic congestive heart failure (HCC)         Subjective:   He feels well today  His wife is in the room  A 10 point review of systems was completely negative  I urged him to rest when he is tired at home    Objective:     Vitals: Blood pressure 150/76, pulse 60, temperature 98 5 °F (36 9 °C), resp  rate 18, height 5' 10" (1 778 m), weight 95 1 kg (209 lb 8 8 oz), SpO2 92 %  ,Body mass index is 30 07 kg/m²      Weight (last 2 days)     Date/Time Weight    01/28/22 0535 95 1 (209 55)    01/27/22 0600 97 4 (214 73)    01/26/22 0557 97 2 (214 29)            Intake/Output Summary (Last 24 hours) at 1/28/2022 1233  Last data filed at 1/27/2022 1300  Gross per 24 hour   Intake 120 ml   Output --   Net 120 ml     I/O last 3 completed shifts: In: 300 [P O :300]  Out: -     HD Temporary Double Catheter (Active)   Reasons to continue HD Cath Treatment Therapy 01/28/22 0401   Goal for Removal N/A- chronic HD catheter 01/28/22 0401   Line Necessity Reviewed Yes, reviewed with provider 01/28/22 0401   Site Assessment WDL; Clean;Dry; Intact 01/28/22 0401   Proximal Lumen Status Capped 01/28/22 0401   Distal Lumen Status Cap changed 01/27/22 1000   Line Care Cap changed; Connections checked and tightened 01/24/22 0935   Dressing Type Chlorhexidine dressing 01/28/22 0401   Dressing Status Clean; Intact;Dry 01/28/22 0401   Dressing Change Due 01/29/22 01/27/22 2136       Physical Exam: /76   Pulse 60   Temp 98 5 °F (36 9 °C)   Resp 18   Ht 5' 10" (1 778 m)   Wt 95 1 kg (209 lb 8 8 oz)   SpO2 92%   BMI 30 07 kg/m²     General Appearance:    Alert, cooperative, no distress, appears stated age   Head:    Normocephalic, without obvious abnormality, atraumatic   Eyes:    Conjunctiva/corneas clear   Ears:    Normal external ears   Nose:   Nares normal, septum midline, mucosa normal, no drainage    or sinus tenderness   Throat:   Lips, mucosa, and tongue normal; teeth and gums normal   Neck:   Supple, symmetrical, trachea midline, no adenopathy;        thyroid:  No enlargement/tenderness/nodules; no carotid    bruit or JVD   Back:     Symmetric, no curvature, ROM normal, no CVA tenderness   Lungs:     Clear to auscultation bilaterally, respirations unlabored   Chest wall:    No tenderness or deformity   Heart:    Regular rate and rhythm, S1 and S2 normal, no murmur, rub   or gallop   Abdomen:     Soft, non-tender, bowel sounds active   Extremities:   Extremities normal, atraumatic, no cyanosis or edema   Skin:   Skin color, texture, turgor normal, no rashes or lesions   Lymph nodes:   Cervical normal Neurologic:   CNII-XII intact            Lab, Imaging and other studies: I have personally reviewed pertinent labs  CBC: No results found for: WBC, HGB, HCT, MCV, PLT, ADJUSTEDWBC, MCH, MCHC, RDW, MPV, NRBC  CMP:   Lab Results   Component Value Date    K 3 3 (L) 01/28/2022    CL 93 (L) 01/28/2022    CO2 26 01/28/2022    BUN 59 (H) 01/28/2022    CREATININE 3 51 (H) 01/28/2022    CALCIUM 8 2 (L) 01/28/2022    EGFR 16 01/28/2022         Results from last 7 days   Lab Units 01/28/22  0426 01/27/22  0500 01/26/22  0451 01/22/22  1441 01/22/22  0646 01/21/22  1359 01/21/22  1359   POTASSIUM mmol/L 3 3* 3 3* 3 3*   < > 5 6*   < > 5 4*   CHLORIDE mmol/L 93* 91* 93*   < > 95*   < > 97   CO2 mmol/L 26 29 27   < > 17*   < > 14*   BUN mg/dL 59* 61* 54*   < > 112*   < > 98*   CREATININE mg/dL 3 51* 3 89* 3 66*   < > 5 94*   < > 5 53*   CALCIUM mg/dL 8 2* 7 9* 8 5   < > 9 3   < > 9 1   ALK PHOS U/L  --   --   --   --  48  --  50   ALT U/L  --   --   --   --  163*  --  37   AST U/L  --   --   --   --  148*  --  37    < > = values in this interval not displayed  Phosphorus: No results found for: PHOS  Magnesium: No results found for: MG  Urinalysis: No results found for: Dean Harder, SPECGRAV, PHUR, LEUKOCYTESUR, NITRITE, PROTEINUA, GLUCOSEU, KETONESU, BILIRUBINUR, BLOODU  Ionized Calcium: No results found for: CAION  Coagulation: No results found for: PT, INR, APTT  Troponin: No results found for: TROPONINI  ABG: No results found for: PHART, EHC2JKS, PO2ART, WNJ1FBX, R3JPUYXU, BEART, SOURCE  Radiology review:     IMAGING  Procedure: XR chest portable    Result Date: 1/27/2022  Narrative: CHEST INDICATION:   volume overload  COMPARISON:  January 22, 2022 EXAM PERFORMED/VIEWS:  XR CHEST PORTABLE Single image FINDINGS:  Lungs are suboptimally aerated  Bibasilar atelectasis  Partial obscuration of left hemidiaphragm suggesting either small effusion or some consolidation in the left lung base   Cardiac size mildly enlarged  Prominent right hilum as before  Mild vascular prominence without significant peribronchial thickening  No pneumothorax or free air  Right side port with catheter tip in the cavoatrial junction  Right jugular central line tip in the mid SVC  Left cardiac pacer with intact leads  Osseous structures appear within normal limits for patient age  Impression: Chest port and right jugular central line as described  No pneumothorax  Diminished lung volumes with bibasilar atelectasis  Partial obscuration of left hemidiaphragm which may be due to small pleural effusion or consolidation  Mild cardiomegaly with vascular congestion especially in the right hilum probably exaggerated by vascular crowding without pulmonary interstitial edema  Recommend follow-up PA and lateral exam with full inspiration  Workstation performed: [id number]     Procedure: VAS upper limb venous duplex scan, unilateral/limited    Result Date: 1/26/2022  Narrative:  [facility] REPORT CLINICAL: Indications:  Patient presents with left upper extremity edema x 2 - 3 days  No known injury  Pacemaker in the upper left chest  Right IJ dialysis catherter  Operative History: 2020-01-12 Left I and D with removal of sesmoid bone 2020-01-08 Left great toe amputation Pacemaker partial B/L hallux amputation Risk Factors The patient has history of HTN, Diabetes (Yes), Hyperlipidemia, CKD, PAD and previous smoking (quit 5-10yrs ago)  CONCLUSION: Impression RIGHT UPPER LIMB LIMITED: Not scanned due to right upper internal jugular catheter covered in bandages  LEFT UPPER LIMB: No evidence of acute or chronic deep vein thrombosis  No evidence of superficial thrombophlebitis noted  Doppler evaluation shows a normal response to augmentation maneuvers    SIGNATURE: Electronically Signed by: Quinn Palacio MD, RPVI on 2022-01-26 02:41:09 PM      Current Facility-Administered Medications   Medication Dose Route Frequency    amLODIPine (NORVASC) tablet 10 mg  10 mg Oral Daily    apixaban (ELIQUIS) tablet 5 mg  5 mg Oral Q12H    calcium acetate (PHOSLO) capsule 667 mg  667 mg Oral Daily With Dinner    diphenoxylate-atropine (LOMOTIL) 2 5-0 025 mg per tablet 1 tablet  1 tablet Oral 4x Daily PRN    famotidine (PEPCID) tablet 20 mg  20 mg Oral Daily    fish oil capsule 1,000 mg  1,000 mg Oral BID    insulin lispro (HumaLOG) 100 units/mL subcutaneous injection 1-6 Units  1-6 Units Subcutaneous TID AC    insulin lispro (HumaLOG) 100 units/mL subcutaneous injection 1-6 Units  1-6 Units Subcutaneous HS    metoprolol tartrate (LOPRESSOR) tablet 50 mg  50 mg Oral Q12H    ondansetron (ZOFRAN) injection 4 mg  4 mg Intravenous Q4H PRN    prochlorperazine (COMPAZINE) tablet 5 mg  5 mg Oral Q6H PRN    saccharomyces boulardii (FLORASTOR) capsule 250 mg  250 mg Oral BID    tamsulosin (FLOMAX) capsule 0 4 mg  0 4 mg Oral Daily With Dinner     Medications Discontinued During This Encounter   Medication Reason    famotidine (PEPCID) tablet 40 mg Dose adjustment    ondansetron (ZOFRAN) injection 4 mg     bumetanide (BUMEX) injection 2 mg     metoclopramide (REGLAN) injection 10 mg     amLODIPine (NORVASC) tablet 10 mg     sodium bicarbonate tablet 1,300 mg     prochlorperazine (COMPAZINE) tablet 5 mg        Anjel Plummer MD      This progress note was produced in part using a dictation device which may document imprecise wording from author's original intent

## 2022-01-28 NOTE — ASSESSMENT & PLAN NOTE
· Presented for shortness of breath while receiving fluids at infusion center due to nausea from chemotherapy  · He was placed on CPAP by EMS prior to arrival for increased work of breathing  · EKG:  Paced rhythm  · BNP: 1067  · Chest x-ray: Persistent or recurrent pulmonary vascular congestion persistent cardiomegaly   · He received 40 mg of IV Lasix followed by Flomax 2 mg IV x 2-ultimately required initiation of dialysis; completed 2 sessions  · Cardiology recommendations appreciated- continue current regimen  · Per patient's wife, he is NOT on home O2  Currently requiring 2 L  Desat study ordered- qualified for 2L  Appreciate CM for coordination     · Medically cleared for discharge

## 2022-01-28 NOTE — CASE MANAGEMENT
Case Management Discharge Planning Note    Patient name [facility]  Location [address] 225/-05 MRN [id number]  : 1955 Date 2022       Current Admission Date: 2022  Current Admission Diagnosis:Acute on chronic diastolic congestive heart failure [facility])   Patient Active Problem List    Diagnosis Date Noted    Acute on chronic diastolic congestive [facility] failure [facility] ) 2022    Nausea 2022    Chemotherapy-induced neutropenia ([facility] ) 2022    Acute kidney injury superimposed on chronic kidney disease ([facility] ) 2021    RSV (respiratory syncytial virus pneumonia) 2021    Colon cancer ([facility] ) 10/23/2021    Absence of toe ([facility] ) 2021    Chronic obstructive pulmonary disease ([facility] ) 2020    SSS (sick sinus syndrome) ([facility] ) 2020    Onychomycosis 2020    Chronic painful diabetic neuropathy ([facility] ) 2020    Renovascular hypertension     Multiple drug resistant organism (MDRO) culture positive 2020    Amputation of left great toe ([facility] ) 2020    Osteomyelitis of left foot ([facility] ) 2020    Hyponatremia 2020    Iron deficiency anemia 2020    Anxiety 2020    Staphylococcus aureus bacteremia 2020    Elevated troponin I level 2020    Type 2 diabetes mellitus with diabetic neuropathy, without long-term current use of insulin ([facility] ) 2020    S/P amputation of lesser toe, left ([facility] ) 2019    S/P amputation of lesser toe, right ([facility] ) 2019    Left renal artery stenosis (HCC) 2019    Chronic diastolic (congestive) heart failure (HCC) 2019    Acute pulmonary edema (HCC)     Hyperkalemia 2019    Abnormal CT of the chest 05/15/2019    Mitral valve stenosis     Anemia 2019    Fever 2019    Nonrheumatic aortic valve stenosis 2018    Type 2 diabetes mellitus with chronic kidney disease, with long-term current use of insulin ([facility] ) 2018    Hypertension 11/14/2018    Stage 4 chronic kidney disease ([facility] ) 11/14/2018    NICOLASA (obstructive sleep apnea) 11/11/2018    Urinary retention 11/06/2018    Persistent proteinuria 11/05/2018    Volume overload 11/05/2018    PAF (paroxysmal atrial fibrillation) ([facility] ) 11/04/2018    Complete heart block (HCC) 09/89/5245    Metabolic acidosis 69/00/3868    Acute kidney injury ([place] [address] ) 11/01/2018    Other hyperlipidemia 11/01/2018    Easy bruising 09/27/2017    Peripheral arterial disease ([place] [address] ) 09/06/2017    Vertigo 08/16/2017    Diabetic ulcer of left foot ([place] [address] ) 03/18/2017    Eczema 12/11/2015    PVCs (premature ventricular contractions) 93/69/6605    Systolic murmur 13/75/5192    Bilateral leg edema 07/09/2015    Erectile dysfunction of non-organic origin 04/24/2012    Gout 04/24/2012    Hyperlipidemia 04/24/2012    Uremic acidosis 04/24/2012    Arthritis 04/24/2012    Rhinitis 04/24/2012      LOS (days): 7  Geometric Mean LOS (GMLOS) (days): 4 30  Days to GMLOS:-2 7     OBJECTIVE:  Risk of Unplanned Readmission Score: 44   Bundled Patient Payment: No Current Bundle     Current admission status: Inpatient   Preferred Pharmacy:   [facility]/pharmacy #[id number] [facility], [address] [place], [place] [address] - [address]  [address]  [address]  Phone: [phone] Fax: [phone]    Primary Care Provider: Miguel Barton DO    Primary Insurance: MEDICARE  Secondary Insurance: [facility]    DISCHARGE DETAILS:    Discharge planning discussed with[de-identified] patient and wife  Freedom of Choice: Yes  Comments - Freedom of Choice: [facility] recommended  pt was accepted by [facility] as requested  CM contacted family/caregiver?: Yes  Were Treatment Team discharge recommendations reviewed with patient/caregiver?: Yes  Did patient/caregiver verbalize understanding of patient care needs?: Yes  Were patient/caregiver advised of the risks associated with not following Treatment Team discharge recommendations?: Yes    Contacts  Patient Contacts: Annabelle Hyde Gabriel  Relationship to Patient[de-identified] Family (wife)  Contact Method:  In Person  Reason/Outcome: Discharge [address]         Is the patient interested in [facility] at discharge?: Yes    DME Referral Provided  Referral made for DME?: Yes (oxygen)  DME referral completed for the following items[de-identified] Home Oxygen concentrator,Portable Oxygen tanks  DME Supplier Name[de-identified] [employer]    Other Referral/Resources/Interventions Provided:  Interventions: HHC  Referral Comments: Denny Rodriguez accepted  pt's choice    Would you like to participate in our [address] service program?  : No - Declined    Treatment Team Recommendation: Home with [address] [employer] (home with spouse and hhc)  Discharge Destination Plan[de-identified] Home with [address] [employer] (wife and [address])  Transport at Discharge : [facility] by: Family member     IMM Given (Date):: 01/28/22  IMM Given to[de-identified] Family (wife)  Family notified[de-identified] wife in room

## 2022-01-28 NOTE — DISCHARGE INSTRUCTIONS
Acute Kidney Injury   WHAT YOU NEED TO KNOW:   Acute kidney injury (ELZBIETA) is also called acute kidney failure, or acute renal failure  ELZBIETA happens when your kidneys suddenly stop working correctly  Normally, the kidneys remove fluid, chemicals, and waste from your blood  These wastes are turned into urine by your kidneys  ELZBIETA usually happens over hours or days  When you have ELZBIETA, your kidneys do not remove the waste, chemicals, or extra fluid from your body  A normal amount of urine is not produced  ELZBIETA is usually temporary, it can take days to months to recover  ELZBIETA can also become a chronic kidney condition  DISCHARGE INSTRUCTIONS:   Call 911 if:   · You have sudden chest pain or trouble breathing  Seek care immediately if:   · Your symptoms get worse  Contact your healthcare provider if:   · Your symptoms return  · Your blood sugar or blood pressure level is not within the range your healthcare provider recommends  · You have questions or concerns about your condition or care  Nutrition:  Your healthcare provider may tell you to eat food low in sodium (salt), potassium, phosphorus, or protein  A dietitian can help you plan your meals  Drink liquids as directed: Your healthcare provider may recommend that you drink a certain amount of liquids  This will help your kidneys work better and decrease your risk for dehydration  Ask how much liquid to drink each day and which liquids are best for you  Prevent acute kidney injury:   · Manage other health conditions  such as diabetes, high blood pressure, or heart disease  These conditions increase your risk for acute kidney injury  Take your medicines for these conditions as directed  Also, monitor your blood sugar and blood pressure levels as directed  Contact your healthcare provider if your levels are not in the range he or she says it should be  · Talk to your healthcare provider before you take over-the-counter-medicine    NSAIDs, stomach medicine, or laxatives may harm your kidneys and increase your risk for acute kidney injury  If it is okay to take the medicine, follow the directions on the package  Do not take more than directed  · Tell healthcare providers you have had acute kidney injury  before you get contrast liquid for an x-ray or CT scan  Your healthcare provider may give you medicine to prevent kidney problems caused by the liquid  Follow up with your healthcare provider as directed: You will need to return for more tests to make sure your kidneys are working properly  You may also be referred to a kidney specialist  Write down your questions so you remember to ask them during your visits  © Copyright "[employer] 2021 Information is for End User's use only and may not be sold, redistributed or otherwise used for commercial purposes  All illustrations and images included in CareNotes® are the copyrighted property of [employer] Inc  or Ross Richard   The above information is an  only  It is not intended as medical advice for individual conditions or treatments  Talk to your doctor, nurse or pharmacist before following any medical regimen to see if it is safe and effective for you  Heart Failure   WHAT YOU NEED TO KNOW:   Heart failure is a condition that does not allow your heart to fill or pump properly  Not enough oxygen in your blood gets to your organs and tissues  Fluid may not move through your body properly  Fluid builds up and causes swelling and trouble breathing  This is known as congestive heart failure  Heart failure may start in the left or right ventricle  Heart failure is often caused by damage or injury to your heart  The damage may be caused by other heart problems, diabetes, or high blood pressure  The damage may have also been caused by an infection  Heart failure is a long-term condition that tends to get worse over time   It is important to manage your health to improve your quality of life  DISCHARGE INSTRUCTIONS:   Call your local emergency number (911 in the [address]) if:   · You have any of the following signs of a heart attack:      ? Squeezing, pressure, or pain in your chest    ? You may  also have any of the following:     § Discomfort or pain in your back, neck, jaw, stomach, or arm    § Shortness of breath    § Nausea or vomiting    § Lightheadedness or a sudden cold sweat      Call your doctor if:   · Your heartbeat is fast, slow, or uneven all the time  · You have symptoms of worsening heart failure:      ? Shortness of breath at rest, at night, or that is getting worse in any way    ? Weight gain of 3 or more pounds (1 4 kg) in a day, or more than your healthcare provider says is okay    ? More swelling in your legs or ankles    ? Abdominal pain or swelling    ? More coughing    ? Loss of appetite    ? Feeling tired all the time    · You feel hopeless or depressed, or you have lost interest in things you used to enjoy  · You often feel worried or afraid  · You have questions or concerns about your condition or care  Medicines:   · Medicines  may be given to help regulate your heart rhythm and lower your blood pressure  You may also need medicines to help decrease extra fluids  Medicines, such as NSAIDs, may be stopped if they are causing your heart failure to become worse  Do not stop any of your medicines on your own  · Take your medicine as directed  Contact your healthcare provider if you think your medicine is not helping or if you have side effects  Tell him or her if you are allergic to any medicine  Keep a list of the medicines, vitamins, and herbs you take  Include the amounts, and when and why you take them  Bring the list or the pill bottles to follow-up visits  Carry your medicine list with you in case of an emergency      Go to cardiac rehab if directed:  Cardiac rehab is a program run by specialists who will help you safely strengthen your heart  In the program you will learn about exercise, relaxation, stress management, and heart-healthy nutrition  Manage swelling from extra fluid:   · Elevate (raise) your legs above the level of your heart  This will help with fluid that builds up in your legs or ankles  Elevate your legs as often as possible during the day  Prop your legs on pillows or blankets to keep them elevated comfortably  Try not to stand for long periods of time during the day  Move around to keep your blood circulating  · Limit sodium (salt)  Ask how much sodium you can have each day  Your healthcare provider may give you a limit, such as 2,300 milligrams (mg) a day  Your provider or a dietitian can teach you how to read food labels for the number of mg in a food  He or she can also help you find ways to have less salt  For example, if you add salt to food as you cook, do not add more at the table  · Drink liquids as directed  You may need to limit the amount of liquid you drink within 24 hours  Your healthcare provider will tell you how much liquid to have and which liquids are best for you  He or she may tell you to limit liquid to 1 5 to 2 liters in a day  He or she will also tell you how often to drink liquid throughout the day  · Weigh yourself every morning  Use the same scale, in the same spot  Do this after you use the bathroom, but before you eat or drink  Wear the same type of clothing each time  Write down your weight and call your healthcare provider if you have a sudden weight gain  Swelling and weight gain are signs of fluid buildup  Manage heart failure: Your quality of life may improve with treatment and the following:  · Do not smoke  Nicotine and other chemicals in cigarettes and cigars can cause lung and heart damage  Ask your healthcare provider for information if you currently smoke and need help to quit  E-cigarettes or smokeless tobacco still contain nicotine   Talk to your healthcare provider before you use these products  · Do not drink alcohol or use illegal drugs  Alcohol and drugs can increase your risk for high blood pressure, diabetes, and coronary artery disease  · Eat heart-healthy foods  Heart-healthy foods include fruits, vegetables, lean meat (such as beef, chicken, or pork), and low-fat dairy products  Fatty fish such as salmon and tuna are also heart healthy  Other heart-healthy foods include walnuts, whole-grain breads, beans, and cooked beans  Replace butter and margarine with heart-healthy oils such as olive oil or canola oil  Your provider or a dietitian can help you create heart-healthy meal plans  · Manage any chronic health conditions you have  These include high blood pressure, diabetes, obesity, high cholesterol, metabolic syndrome, and COPD  You will have fewer symptoms if you manage these health conditions  Follow your healthcare provider's recommendations and follow up with him or her regularly  · Maintain a healthy weight  Being overweight can increase your risk for high blood pressure, diabetes, and coronary artery disease  These conditions can make your symptoms worse  Ask your healthcare provider how much you should weigh  Ask him or her to help you create a weight loss plan if you are overweight  · Stay active  Activity can help keep your symptoms from getting worse  Walking is a type of physical activity that helps maintain your strength and improve your mood  Physical activity also helps you manage your weight  Work with your healthcare provider to create an exercise plan that is right for you  · Get vaccines as directed  The flu and pneumonia can be severe for a person who has heart failure  Vaccines protect you from these infections  Get a flu shot every year as soon as it is recommended, usually in September or October  You may also need the pneumonia vaccine   Your healthcare provider can tell you if you need other vaccines, and when to get them  Follow up with your doctor within 7 days and as directed: You may need to return for other tests  You may need home health care  A healthcare provider will monitor your vital signs, weight, and make sure your medicines are working  Write down your questions so you remember to ask them during your visits  Join a support group:  Heart failure can be difficult to manage  It may be helpful to talk with others who have heart failure  You may learn how to better manage your condition or get emotional support  For more information:  · [address]  [place] , [place]   Phone: [phone]  Web Address: https://[employer]/  [address] 2021 Information is for End User's use only and may not be sold, redistributed or otherwise used for commercial purposes  All illustrations and images included in CareNotes® are the copyrighted property of [employer] Inc  or [address] [place], [place] [address]lucila Richard   The above information is an  only  It is not intended as medical advice for individual conditions or treatments  Talk to your doctor, nurse or pharmacist before following any medical regimen to see if it is safe and effective for you

## 2022-01-28 NOTE — ASSESSMENT & PLAN NOTE
· Cardiology consult appreciated  -discharge on pre-admission current anticoagulation with Eliquis 5 mg twice daily  -discharged on pre-admission metoprolol tartrate 50 mg twice daily

## 2022-01-28 NOTE — PLAN OF CARE
Problem: Nutrition/Hydration-ADULT  Goal: Nutrient/Hydration intake appropriate for improving, restoring or maintaining nutritional needs  Description: Monitor and assess patient's nutrition/hydration status for malnutrition  Collaborate with interdisciplinary team and initiate plan and interventions as ordered  Monitor patient's weight and dietary intake as ordered or per policy  Utilize nutrition screening tool and intervene as necessary  Determine patient's food preferences and provide high-protein, high-caloric foods as appropriate       INTERVENTIONS:  - Monitor oral intake, urinary output, labs, and treatment plans  - Assess nutrition and hydration status and recommend course of action  - Evaluate amount of meals eaten  - Assist patient with eating if necessary   - Allow adequate time for meals  - Recommend/ encourage appropriate diets, oral nutritional supplements, and vitamin/mineral supplements  - Order, calculate, and assess calorie counts as needed  - Recommend, monitor, and adjust tube feedings and TPN/PPN based on assessed needs  - Assess need for intravenous fluids  - Provide specific nutrition/hydration education as appropriate  - Include patient/family/caregiver in decisions related to nutrition  Outcome: Adequate for Discharge     Problem: RESPIRATORY - ADULT  Goal: Achieves optimal ventilation and oxygenation  Description: INTERVENTIONS:  - Assess for changes in respiratory status  - Assess for changes in mentation and behavior  - Position to facilitate oxygenation and minimize respiratory effort  - Oxygen administered by appropriate delivery if ordered  - Initiate smoking cessation education as indicated  - Encourage broncho-pulmonary hygiene including cough, deep breathe, Incentive Spirometry  - Assess the need for suctioning and aspirate as needed  - Assess and instruct to report SOB or any respiratory difficulty  - Respiratory Therapy support as indicated  Outcome: Adequate for Discharge Problem: GASTROINTESTINAL - ADULT  Goal: Minimal or absence of nausea and/or vomiting  Description: INTERVENTIONS:  - Administer IV fluids if ordered to ensure adequate hydration  - Maintain NPO status until nausea and vomiting are resolved  - Nasogastric tube if ordered  - Administer ordered antiemetic medications as needed  - Provide nonpharmacologic comfort measures as appropriate  - Advance diet as tolerated, if ordered  - Consider nutrition services referral to assist patient with adequate nutrition and appropriate food choices  Outcome: Adequate for Discharge  Goal: Maintains or returns to baseline bowel function  Description: INTERVENTIONS:  - Assess bowel function  - Encourage oral fluids to ensure adequate hydration  - Administer IV fluids if ordered to ensure adequate hydration  - Administer ordered medications as needed  - Encourage mobilization and activity  - Consider nutritional services referral to assist patient with adequate nutrition and appropriate food choices  Outcome: Adequate for Discharge  Goal: Maintains adequate nutritional intake  Description: INTERVENTIONS:  - Monitor percentage of each meal consumed  - Identify factors contributing to decreased intake, treat as appropriate  - Assist with meals as needed  - Monitor I&O, weight, and lab values if indicated  - Obtain nutrition services referral as needed  Outcome: Adequate for Discharge  Goal: Oral mucous membranes remain intact  Description: INTERVENTIONS  - Assess oral mucosa and hygiene practices  - Implement preventative oral hygiene regimen  - Implement oral medicated treatments as ordered  - Initiate Nutrition services referral as needed  Outcome: Adequate for Discharge     Problem: PAIN - ADULT  Goal: Verbalizes/displays adequate comfort level or baseline comfort level  Description: Interventions:  - Encourage patient to monitor pain and request assistance  - Assess pain using appropriate pain scale  - Administer analgesics based on type and severity of pain and evaluate response  - Implement non-pharmacological measures as appropriate and evaluate response  - Consider cultural and social influences on pain and pain management  - Notify physician/advanced practitioner if interventions unsuccessful or patient reports new pain  Outcome: Adequate for Discharge     Problem: INFECTION - ADULT  Goal: Absence or prevention of progression during hospitalization  Description: INTERVENTIONS:  - Assess and monitor for signs and symptoms of infection  - Monitor lab/diagnostic results  - Monitor all insertion sites, i e  indwelling lines, tubes, and drains  - Monitor endotracheal if appropriate and nasal secretions for changes in amount and color  - [place] appropriate cooling/warming therapies per order  - Administer medications as ordered  - Instruct and encourage patient and family to use good hand hygiene technique  - Identify and instruct in appropriate isolation precautions for identified infection/condition  Outcome: Adequate for Discharge     Problem: SAFETY ADULT  Goal: Patient will remain free of falls  Description: INTERVENTIONS:  - Educate patient/family on patient safety including physical limitations  - Instruct patient to call for assistance with activity   - Consult OT/PT to assist with strengthening/mobility   - Keep Call bell within reach  - Keep bed low and locked with side rails adjusted as appropriate  - Keep care items and personal belongings within reach  - Initiate and maintain comfort rounds  - Make Fall Risk Sign visible to staff  - Offer Toileting every 2 Hours, in advance of need  - Apply yellow socks and bracelet for high fall risk patients  - Consider moving patient to room near nurses station  Outcome: Adequate for Discharge  Goal: Maintain or return to baseline ADL function  Description: INTERVENTIONS:  -  Assess patient's ability to carry out ADLs; assess patient's baseline for ADL function and identify physical deficits which impact ability to perform ADLs (bathing, care of mouth/teeth, toileting, grooming, dressing, etc )  - Assess/evaluate cause of self-care deficits   - Assess range of motion  - Assess patient's mobility; develop plan if impaired  - Assess patient's need for assistive devices and provide as appropriate  - Encourage maximum independence but intervene and supervise when necessary  - Involve family in performance of ADLs  - Assess for home care needs following discharge   - Consider OT consult to assist with ADL evaluation and planning for discharge  - Provide patient education as appropriate  Outcome: Adequate for Discharge  Goal: Maintains/Returns to pre admission functional level  Description: INTERVENTIONS:  - Perform BMAT or MOVE assessment daily    - Set and communicate daily mobility goal to care team and patient/family/caregiver     - Collaborate with rehabilitation services on mobility goals if consulted  - Out of bed for toileting  - Record patient progress and toleration of activity level   Outcome: Adequate for Discharge     Problem: DISCHARGE PLANNING  Goal: Discharge to home or other facility with appropriate resources  Description: INTERVENTIONS:  - Identify barriers to discharge w/patient and caregiver  - Arrange for needed discharge resources and transportation as appropriate  - Identify discharge learning needs (meds, wound care, etc )  - Arrange for interpretive services to assist at discharge as needed  - Refer to Case Management Department for coordinating discharge planning if the patient needs post-hospital services based on physician/advanced practitioner order or complex needs related to functional status, cognitive ability, or social support system  Outcome: Adequate for Discharge     Problem: Knowledge Deficit  Goal: Patient/family/caregiver demonstrates understanding of disease process, treatment plan, medications, and discharge instructions  Description: Complete learning assessment and assess knowledge base    Interventions:  - Provide teaching at level of understanding  - Provide teaching via preferred learning methods  Outcome: Adequate for Discharge     Problem: Prexisting or High Potential for Compromised Skin Integrity  Goal: Skin integrity is maintained or improved  Description: INTERVENTIONS:  - Identify patients at risk for skin breakdown  - Assess and monitor skin integrity  - Assess and monitor nutrition and hydration status  - Monitor labs   - Assess for incontinence   - Turn and reposition patient  - Assist with mobility/ambulation  - Relieve pressure over bony prominences  - Avoid friction and shearing  - Provide appropriate hygiene as needed including keeping skin clean and dry  - Evaluate need for skin moisturizer/barrier cream  - Collaborate with interdisciplinary team   - Patient/family teaching  - Consider wound care consult   Outcome: Adequate for Discharge     Problem: MOBILITY - ADULT  Goal: Maintain or return to baseline ADL function  Description: INTERVENTIONS:  -  Assess patient's ability to carry out ADLs; assess patient's baseline for ADL function and identify physical deficits which impact ability to perform ADLs (bathing, care of mouth/teeth, toileting, grooming, dressing, etc )  - Assess/evaluate cause of self-care deficits   - Assess range of motion  - Assess patient's mobility; develop plan if impaired  - Assess patient's need for assistive devices and provide as appropriate  - Encourage maximum independence but intervene and supervise when necessary  - Involve family in performance of ADLs  - Assess for home care needs following discharge   - Consider OT consult to assist with ADL evaluation and planning for discharge  - Provide patient education as appropriate  Outcome: Adequate for Discharge  Goal: Maintains/Returns to pre admission functional level  Description: INTERVENTIONS:  - Perform BMAT or MOVE assessment daily    - Set and communicate daily mobility goal to care team and patient/family/caregiver     - Collaborate with rehabilitation services on mobility goals if consulted  - Out of bed for toileting  - Record patient progress and toleration of activity level   Outcome: Adequate for Discharge     Problem: Potential for Falls  Goal: Patient will remain free of falls  Description: INTERVENTIONS:  - Educate patient/family on patient safety including physical limitations  - Instruct patient to call for assistance with activity   - Consult OT/PT to assist with strengthening/mobility   - Keep Call bell within reach  - Keep bed low and locked with side rails adjusted as appropriate  - Keep care items and personal belongings within reach  - Initiate and maintain comfort rounds  - Make Fall Risk Sign visible to staff  - Offer Toileting every 2 Hours, in advance of need  - Apply yellow socks and bracelet for high fall risk patients  - Consider moving patient to room near nurses station  Outcome: Adequate for Discharge     Problem: METABOLIC, FLUID AND ELECTROLYTES - ADULT  Goal: Electrolytes maintained within normal limits  Description: INTERVENTIONS:  - Monitor labs and assess patient for signs and symptoms of electrolyte imbalances  - Administer electrolyte replacement as ordered  - Monitor response to electrolyte replacements, including repeat lab results as appropriate  - Instruct patient on fluid and nutrition as appropriate  Outcome: Adequate for Discharge  Goal: Fluid balance maintained  Description: INTERVENTIONS:  - Monitor labs   - Monitor I/O and WT  - Instruct patient on fluid and nutrition as appropriate  - Assess for signs & symptoms of volume excess or deficit  Outcome: Adequate for Discharge

## 2022-01-28 NOTE — CASE MANAGEMENT
Case Management Discharge Planning Note    Patient name Yamilex Part  Location [address] [facility] [address] 225/-[id number] MRN [id number]  : 1955 Date 2022       Current Admission Date: 2022  Current Admission Diagnosis:Acute on chronic diastolic congestive heart failure [facility])   Patient Active Problem List    Diagnosis Date Noted    Acute on chronic diastolic congestive heart failure ([facility] ) 2022    Nausea 2022    Chemotherapy-induced neutropenia ([facility] ) 2022    Acute kidney injury superimposed on chronic kidney disease ([facility] ) 2021    RSV (respiratory syncytial virus pneumonia) 2021    Colon cancer ([facility] ) 10/23/2021    Absence of toe ([facility] ) 2021    Chronic obstructive pulmonary disease ([facility] ) 2020    SSS (sick sinus syndrome) ([facility] ) 2020    Onychomycosis 2020    Chronic painful diabetic neuropathy ([facility] ) 2020    Renovascular hypertension     Multiple drug resistant organism (MDRO) culture positive 2020    Amputation of left great toe ([facility] ) 2020    Osteomyelitis of left foot ([facility] ) 2020    Hyponatremia 2020    Iron deficiency anemia 2020    Anxiety 2020    Staphylococcus aureus bacteremia 2020    Elevated troponin I level 2020    Type 2 diabetes mellitus with diabetic neuropathy, without long-term current use of insulin ([facility] ) 2020    S/P amputation of lesser toe, left ([facility] ) 2019    S/P amputation of lesser toe, right ([facility] ) 2019    Left renal artery stenosis (HCC) 2019    Chronic diastolic (congestive) heart failure (HCC) 2019    Acute pulmonary edema (HCC)     Hyperkalemia 2019    Abnormal CT of the chest 05/15/2019    Mitral valve stenosis     Anemia 2019    Fever 2019    Nonrheumatic aortic valve stenosis 2018    Type 2 diabetes mellitus with chronic kidney disease, with long-term current use of insulin ([facility] ) 2018    Hypertension 11/14/2018    Stage 4 chronic kidney disease ([facility] ) 11/14/2018    NICOLASA (obstructive sleep apnea) 11/11/2018    Urinary retention 11/06/2018    Persistent proteinuria 11/05/2018    Volume overload 11/05/2018    PAF (paroxysmal atrial fibrillation) ([facility] ) 11/04/2018    Complete heart block (HCC) 78/51/6222    Metabolic acidosis 96/39/8845    Acute kidney injury ([place] [address] ) 11/01/2018    Other hyperlipidemia 11/01/2018    Easy bruising 09/27/2017    Peripheral arterial disease ([place] [address] ) 09/06/2017    Vertigo 08/16/2017    Diabetic ulcer of left foot ([place] [address] ) 03/18/2017    Eczema 12/11/2015    PVCs (premature ventricular contractions) 56/35/6122    Systolic murmur 89/01/9801    Bilateral leg edema 07/09/2015    Erectile dysfunction of non-organic origin 04/24/2012    Gout 04/24/2012    Hyperlipidemia 04/24/2012    Uremic acidosis 04/24/2012    Arthritis 04/24/2012    Rhinitis 04/24/2012      LOS (days): 7  Geometric Mean LOS (GMLOS) (days): 4 30  Days to GMLOS:-2 7     OBJECTIVE:  Risk of Unplanned Readmission Score: 44   Bundled Patient Payment: No Current Bundle     Current admission status: Inpatient   Preferred Pharmacy:   [facility]/pharmacy #[id number], [place] - [address]  [address]  [address]  Phone: [phone] Fax: [phone]    Primary Care Provider: Saulo Elias DO    Primary Insurance: MEDICARE  Secondary Insurance: [facility]    DISCHARGE DETAILS:    Discharge planning discussed with[de-identified] patient and wife  Freedom of Choice: Yes  Comments - Freedom of Choice: c recommended  pt was accepted by [facility] as requested  CM contacted family/caregiver?: Yes  Were Treatment Team discharge recommendations reviewed with patient/caregiver?: Yes  Did patient/caregiver verbalize understanding of patient care needs?: Yes  Were patient/caregiver advised of the risks associated with not following Treatment Team discharge recommendations?: Yes    Contacts  Patient Contacts: Junior Loving Gabriel  Relationship to Patient[de-identified] Family (wife)  Contact Method:  In Person  Reason/Outcome: Discharge [address]         Is the patient interested in [facility] at discharge?: Yes    DME Referral Provided  Referral made for DME?: Yes (oxygen)  DME referral completed for the following items[de-identified] Home Oxygen concentrator,Portable Oxygen tanks  DME Supplier Name[de-identified] [employer]    Other Referral/Resources/Interventions Provided:  Interventions: HHC  Referral Comments: Lisa Connors accepted  pt's choice    Would you like to participate in our [facility] service program?  : No - Declined    Treatment Team Recommendation: Home with [address] (home with spouse and hhc)  Discharge Destination Plan[de-identified] Home with [address] Yourself (wife and [facility])  Transport at Discharge : Joon Coe was made aware of the d/c and avs was faxed as requested [phone]           Accompanied by: Family member     IMM Given (Date):: 01/28/22  IMM Given to[de-identified] Family (wife)  Family notified[de-identified] wife in room

## 2022-01-28 NOTE — RESPIRATORY THERAPY NOTE
Home Oxygen Qualifying Test       Patient name: Tasneem Adair        : 1955   Date of Test:  2022  Diagnosis:      Home Oxygen Test:    **Medicare Guidelines require item(s) 1-5 on all ambulatory patients or 1 and 2 on non-ambulatory patients  1   Baseline SPO2 on Room Air at rest 89 %  2   SPO2 during exercise on Room Air 83 %  During exercise monitor SpO2  If SPO2 increases >=89% with ambulation do not add supplemental oxygen  If <= 88% on room air add O2 via NC and titrate patient  Patient must be ambulated with O2 and titrated to > 88% with exertion  3   SPO2 on Oxygen at rest 94 % 2 lpm     4   SPO2 during exercise on Oxygen  94% a liter flow of 2 lpm     5   Exercise performed:          walking          [x]  Supplemental Home Oxygen is indicated  []  Client does not qualify for home oxygen  Respiratory Additional Notes- Walked in room  Mild dyspnea noted on room air  Placed on 2L NC to walk and Sats remained above 90% and improved SOB      Sabino Frazier, RT

## 2022-01-28 NOTE — PLAN OF CARE
Problem: Nutrition/Hydration-ADULT  Goal: Nutrient/Hydration intake appropriate for improving, restoring or maintaining nutritional needs  Description: Monitor and assess patient's nutrition/hydration status for malnutrition  Collaborate with interdisciplinary team and initiate plan and interventions as ordered  Monitor patient's weight and dietary intake as ordered or per policy  Utilize nutrition screening tool and intervene as necessary  Determine patient's food preferences and provide high-protein, high-caloric foods as appropriate       INTERVENTIONS:  - Monitor oral intake, urinary output, labs, and treatment plans  - Assess nutrition and hydration status and recommend course of action  - Evaluate amount of meals eaten  - Assist patient with eating if necessary   - Allow adequate time for meals  - Recommend/ encourage appropriate diets, oral nutritional supplements, and vitamin/mineral supplements  - Order, calculate, and assess calorie counts as needed  - Recommend, monitor, and adjust tube feedings and TPN/PPN based on assessed needs  - Assess need for intravenous fluids  - Provide specific nutrition/hydration education as appropriate  - Include patient/family/caregiver in decisions related to nutrition  Outcome: Progressing     Problem: RESPIRATORY - ADULT  Goal: Achieves optimal ventilation and oxygenation  Description: INTERVENTIONS:  - Assess for changes in respiratory status  - Assess for changes in mentation and behavior  - Position to facilitate oxygenation and minimize respiratory effort  - Oxygen administered by appropriate delivery if ordered  - Initiate smoking cessation education as indicated  - Encourage broncho-pulmonary hygiene including cough, deep breathe, Incentive Spirometry  - Assess the need for suctioning and aspirate as needed  - Assess and instruct to report SOB or any respiratory difficulty  - Respiratory Therapy support as indicated  Outcome: Progressing     Problem: GASTROINTESTINAL - ADULT  Goal: Minimal or absence of nausea and/or vomiting  Description: INTERVENTIONS:  - Administer IV fluids if ordered to ensure adequate hydration  - Maintain NPO status until nausea and vomiting are resolved  - Nasogastric tube if ordered  - Administer ordered antiemetic medications as needed  - Provide nonpharmacologic comfort measures as appropriate  - Advance diet as tolerated, if ordered  - Consider nutrition services referral to assist patient with adequate nutrition and appropriate food choices  Outcome: Progressing  Goal: Maintains or returns to baseline bowel function  Description: INTERVENTIONS:  - Assess bowel function  - Encourage oral fluids to ensure adequate hydration  - Administer IV fluids if ordered to ensure adequate hydration  - Administer ordered medications as needed  - Encourage mobilization and activity  - Consider nutritional services referral to assist patient with adequate nutrition and appropriate food choices  Outcome: Progressing  Goal: Maintains adequate nutritional intake  Description: INTERVENTIONS:  - Monitor percentage of each meal consumed  - Identify factors contributing to decreased intake, treat as appropriate  - Assist with meals as needed  - Monitor I&O, weight, and lab values if indicated  - Obtain nutrition services referral as needed  Outcome: Progressing  Goal: Oral mucous membranes remain intact  Description: INTERVENTIONS  - Assess oral mucosa and hygiene practices  - Implement preventative oral hygiene regimen  - Implement oral medicated treatments as ordered  - Initiate Nutrition services referral as needed  Outcome: Progressing     Problem: PAIN - ADULT  Goal: Verbalizes/displays adequate comfort level or baseline comfort level  Description: Interventions:  - Encourage patient to monitor pain and request assistance  - Assess pain using appropriate pain scale  - Administer analgesics based on type and severity of pain and evaluate response  - Implement non-pharmacological measures as appropriate and evaluate response  - Consider cultural and social influences on pain and pain management  - Notify physician/advanced practitioner if interventions unsuccessful or patient reports new pain  Outcome: Progressing     Problem: INFECTION - ADULT  Goal: Absence or prevention of progression during hospitalization  Description: INTERVENTIONS:  - Assess and monitor for signs and symptoms of infection  - Monitor lab/diagnostic results  - Monitor all insertion sites, i e  indwelling lines, tubes, and drains  - Monitor endotracheal if appropriate and nasal secretions for changes in amount and color  - [place] appropriate cooling/warming therapies per order  - Administer medications as ordered  - Instruct and encourage patient and family to use good hand hygiene technique  - Identify and instruct in appropriate isolation precautions for identified infection/condition  Outcome: Progressing     Problem: SAFETY ADULT  Goal: Patient will remain free of falls  Description: INTERVENTIONS:  - Educate patient/family on patient safety including physical limitations  - Instruct patient to call for assistance with activity   - Consult OT/PT to assist with strengthening/mobility   - Keep Call bell within reach  - Keep bed low and locked with side rails adjusted as appropriate  - Keep care items and personal belongings within reach  - Initiate and maintain comfort rounds  - Make Fall Risk Sign visible to staff  - Offer Toileting every 2 Hours, in advance of need  - Apply yellow socks and bracelet for high fall risk patients  - Consider moving patient to room near nurses station  Outcome: Progressing  Goal: Maintain or return to baseline ADL function  Description: INTERVENTIONS:  -  Assess patient's ability to carry out ADLs; assess patient's baseline for ADL function and identify physical deficits which impact ability to perform ADLs (bathing, care of mouth/teeth, toileting, grooming, dressing, etc )  - Assess/evaluate cause of self-care deficits   - Assess range of motion  - Assess patient's mobility; develop plan if impaired  - Assess patient's need for assistive devices and provide as appropriate  - Encourage maximum independence but intervene and supervise when necessary  - Involve family in performance of ADLs  - Assess for home care needs following discharge   - Consider OT consult to assist with ADL evaluation and planning for discharge  - Provide patient education as appropriate  Outcome: Progressing  Goal: Maintains/Returns to pre admission functional level  Description: INTERVENTIONS:  - Perform BMAT or MOVE assessment daily    - Set and communicate daily mobility goal to care team and patient/family/caregiver  - Collaborate with rehabilitation services on mobility goals if consulted  - Out of bed for toileting  - Record patient progress and toleration of activity level   Outcome: Progressing     Problem: DISCHARGE PLANNING  Goal: Discharge to home or other facility with appropriate resources  Description: INTERVENTIONS:  - Identify barriers to discharge w/patient and caregiver  - Arrange for needed discharge resources and transportation as appropriate  - Identify discharge learning needs (meds, wound care, etc )  - Arrange for interpretive services to assist at discharge as needed  - Refer to Case Management Department for coordinating discharge planning if the patient needs post-hospital services based on physician/advanced practitioner order or complex needs related to functional status, cognitive ability, or social support system  Outcome: Progressing     Problem: Knowledge Deficit  Goal: Patient/family/caregiver demonstrates understanding of disease process, treatment plan, medications, and discharge instructions  Description: Complete learning assessment and assess knowledge base    Interventions:  - Provide teaching at level of understanding  - Provide teaching via preferred learning methods  Outcome: Progressing     Problem: Prexisting or High Potential for Compromised Skin Integrity  Goal: Skin integrity is maintained or improved  Description: INTERVENTIONS:  - Identify patients at risk for skin breakdown  - Assess and monitor skin integrity  - Assess and monitor nutrition and hydration status  - Monitor labs   - Assess for incontinence   - Turn and reposition patient  - Assist with mobility/ambulation  - Relieve pressure over bony prominences  - Avoid friction and shearing  - Provide appropriate hygiene as needed including keeping skin clean and dry  - Evaluate need for skin moisturizer/barrier cream  - Collaborate with interdisciplinary team   - Patient/family teaching  - Consider wound care consult   Outcome: Progressing     Problem: MOBILITY - ADULT  Goal: Maintain or return to baseline ADL function  Description: INTERVENTIONS:  -  Assess patient's ability to carry out ADLs; assess patient's baseline for ADL function and identify physical deficits which impact ability to perform ADLs (bathing, care of mouth/teeth, toileting, grooming, dressing, etc )  - Assess/evaluate cause of self-care deficits   - Assess range of motion  - Assess patient's mobility; develop plan if impaired  - Assess patient's need for assistive devices and provide as appropriate  - Encourage maximum independence but intervene and supervise when necessary  - Involve family in performance of ADLs  - Assess for home care needs following discharge   - Consider OT consult to assist with ADL evaluation and planning for discharge  - Provide patient education as appropriate  Outcome: Progressing  Goal: Maintains/Returns to pre admission functional level  Description: INTERVENTIONS:  - Perform BMAT or MOVE assessment daily    - Set and communicate daily mobility goal to care team and patient/family/caregiver     - Collaborate with rehabilitation services on mobility goals if consulted  - Out of bed for toileting  - Record patient progress and toleration of activity level   Outcome: Progressing     Problem: Potential for Falls  Goal: Patient will remain free of falls  Description: INTERVENTIONS:  - Educate patient/family on patient safety including physical limitations  - Instruct patient to call for assistance with activity   - Consult OT/PT to assist with strengthening/mobility   - Keep Call bell within reach  - Keep bed low and locked with side rails adjusted as appropriate  - Keep care items and personal belongings within reach  - Initiate and maintain comfort rounds  - Make Fall Risk Sign visible to staff  - Offer Toileting every 2 Hours, in advance of need  - Apply yellow socks and bracelet for high fall risk patients  - Consider moving patient to room near nurses station  Outcome: Progressing     Problem: METABOLIC, FLUID AND ELECTROLYTES - ADULT  Goal: Electrolytes maintained within normal limits  Description: INTERVENTIONS:  - Monitor labs and assess patient for signs and symptoms of electrolyte imbalances  - Administer electrolyte replacement as ordered  - Monitor response to electrolyte replacements, including repeat lab results as appropriate  - Instruct patient on fluid and nutrition as appropriate  Outcome: Progressing  Goal: Fluid balance maintained  Description: INTERVENTIONS:  - Monitor labs   - Monitor I/O and WT  - Instruct patient on fluid and nutrition as appropriate  - Assess for signs & symptoms of volume excess or deficit  Outcome: Progressing

## 2022-01-31 ENCOUNTER — TRANSITIONAL CARE MANAGEMENT (OUTPATIENT)
Dept: FAMILY MEDICINE CLINIC | Facility: CLINIC | Age: 67
End: 2022-01-31

## 2022-01-31 ENCOUNTER — PATIENT MESSAGE (OUTPATIENT)
Dept: ENDOCRINOLOGY | Facility: CLINIC | Age: 67
End: 2022-01-31

## 2022-01-31 DIAGNOSIS — N18.30 TYPE 2 DIABETES MELLITUS WITH STAGE 3 CHRONIC KIDNEY DISEASE, WITH LONG-TERM CURRENT USE OF INSULIN (HCC): ICD-10-CM

## 2022-01-31 DIAGNOSIS — Z79.4 TYPE 2 DIABETES MELLITUS WITH STAGE 3 CHRONIC KIDNEY DISEASE, WITH LONG-TERM CURRENT USE OF INSULIN (HCC): ICD-10-CM

## 2022-01-31 DIAGNOSIS — E11.22 TYPE 2 DIABETES MELLITUS WITH STAGE 3 CHRONIC KIDNEY DISEASE, WITH LONG-TERM CURRENT USE OF INSULIN (HCC): ICD-10-CM

## 2022-02-01 ENCOUNTER — TELEPHONE (OUTPATIENT)
Dept: HEMATOLOGY ONCOLOGY | Facility: CLINIC | Age: 67
End: 2022-02-01

## 2022-02-01 RX ORDER — PEN NEEDLE, DIABETIC 32GX 5/32"
NEEDLE, DISPOSABLE MISCELLANEOUS
Qty: 100 EACH | Refills: 2 | Status: SHIPPED | OUTPATIENT
Start: 2022-02-01

## 2022-02-01 NOTE — TELEPHONE ENCOUNTER
Patient's wife Zelalem Arguello contacted our office via [address] [place] [address] [phone]  She is a patient of Dr Karla King and she requested Gloria Jim have a 2nd opinion with Dr Karla King at the end of this month  Appointment made and Zelalem Arguello verbalized understanding  Joaquim Peabody

## 2022-02-02 ENCOUNTER — TELEPHONE (OUTPATIENT)
Dept: NEPHROLOGY | Facility: CLINIC | Age: 67
End: 2022-02-02

## 2022-02-02 NOTE — TELEPHONE ENCOUNTER
Patient of Dr Chanelle Barragan a call from patient's wife David Tavarez stating he was recently discharged after a bad initial chemo treatment and has been experiencing edema as well as weight gain post hospital stay  Patient has gained 3 5 lbs in less than 5 days and is swollen from his testicles down  Patient is scheduled 2/3 with [facility] but would like to know if there is anything that can be done in the meantime

## 2022-02-02 NOTE — TELEPHONE ENCOUNTER
Spoke with patient he said he is already taking 40mg of torsemide daily  I told him I would relay the message

## 2022-02-03 ENCOUNTER — TELEPHONE (OUTPATIENT)
Dept: NEPHROLOGY | Facility: CLINIC | Age: 67
End: 2022-02-03

## 2022-02-03 ENCOUNTER — OFFICE VISIT (OUTPATIENT)
Dept: NEPHROLOGY | Facility: CLINIC | Age: 67
End: 2022-02-03
Payer: MEDICARE

## 2022-02-03 VITALS
DIASTOLIC BLOOD PRESSURE: 64 MMHG | HEART RATE: 68 BPM | HEIGHT: 70 IN | RESPIRATION RATE: 16 BRPM | BODY MASS INDEX: 30.49 KG/M2 | WEIGHT: 213 LBS | SYSTOLIC BLOOD PRESSURE: 148 MMHG

## 2022-02-03 DIAGNOSIS — I50.32 CHRONIC DIASTOLIC (CONGESTIVE) HEART FAILURE (HCC): ICD-10-CM

## 2022-02-03 DIAGNOSIS — E87.6 HYPOKALEMIA: ICD-10-CM

## 2022-02-03 DIAGNOSIS — N18.4 CKD (CHRONIC KIDNEY DISEASE), STAGE IV (HCC): Primary | ICD-10-CM

## 2022-02-03 DIAGNOSIS — D50.9 IRON DEFICIENCY ANEMIA, UNSPECIFIED IRON DEFICIENCY ANEMIA TYPE: ICD-10-CM

## 2022-02-03 DIAGNOSIS — R60.1 GENERALIZED EDEMA: ICD-10-CM

## 2022-02-03 DIAGNOSIS — E87.1 HYPONATREMIA: ICD-10-CM

## 2022-02-03 DIAGNOSIS — N17.9 ACUTE RENAL FAILURE, UNSPECIFIED ACUTE RENAL FAILURE TYPE (HCC): ICD-10-CM

## 2022-02-03 PROCEDURE — 99214 OFFICE O/P EST MOD 30 MIN: CPT | Performed by: PHYSICIAN ASSISTANT

## 2022-02-03 RX ORDER — METOLAZONE 5 MG/1
5 TABLET ORAL AS NEEDED
Qty: 12 TABLET | Refills: 3 | Status: SHIPPED | OUTPATIENT
Start: 2022-02-03 | End: 2022-04-07

## 2022-02-03 RX ORDER — POTASSIUM CHLORIDE 20 MEQ/1
40 TABLET, EXTENDED RELEASE ORAL 2 TIMES DAILY
Start: 2022-02-03 | End: 2022-02-07

## 2022-02-03 RX ORDER — TORSEMIDE 20 MG/1
40 TABLET ORAL 2 TIMES DAILY
Refills: 3
Start: 2022-02-03 | End: 2022-02-11

## 2022-02-03 NOTE — PROGRESS NOTES
OFFICE FOLLOW UP - Nephrology   Guillermina Buckley 74 y o  male MRN: [id number]       ASSESSMENT/PLAN:  1  Acute kidney injury on CKD 4:  Creatinine recently up to 6 1 during  hospital admission  Status post hemodialysis 1/22 and 1/24  Creatinine at discharge down to 3 5  · Baseline creatinine typically around 3-3 6 and follows with Dr Myriam Ren   Occasionally creatinine into the mid 2s but this was felt to be dilutional   · CKD due to diabetic nephropathy  · We did discuss the likelihood of needing hemodialysis again in the future  He tolerated dialysis well and does wish to pursue dialysis again in the future if needed  2  Acute on chronic diastolic CHF:  Due to outpatient IV fluids prior to admission  Treated with IV lasix and then UF with HD  Now with worsening edema in legs and scrotum after and discharge  · Currently on torsemide 40 mg daily  · Will increase torsemide to 40 mg b i d  + metolazone 1 to 2 times a week p r n  if no improvement with increased torsemide  3  Hyponatremia:  Sodium 129 hospital discharge  Increasing torsemide as above  4  Hypokalemia: Potassium down to 3 3 at discharge  Was hyperkalemic earlier in the admission  · With increasing torsemide will start k-dur 40meq bid   5  Adenocarcinoma of the colon:  Status post chemotherapy x1 which was stopped as he did not tolerate this well  Patient does not think he wants to pursue further chemotherapy and most likely would like to focus on comfort and quality of life  He is planning to get a 2nd opinion later in this month to discuss any other possible chemotherapy options  We discussed the option of palliative care if he decides to not undergo further chemotherapy  6  Hypertension:  Blood pressure slightly above goal   Should improve with increasing diuretics  7  Anemia:  Patient had iron infusions in the past   Hemoglobin trending down to 10 2    Repeating iron levels with next lab    Plan:   · Discussed case with Dr Myriam Ren  · Will increase torsemide to 40 mg b i d   + metolazone 5 mg 1-2 times a week as needed for weight gain or worsening edema  · K-Dur 40 mEq b i d  · Repeat BMP in 1 week (previously ordered)  · Has follow up 2/28 with Dr Bryan Swenson   · Consider palliative care referral if patient decides not to pursue further chemotherapy      HPI: Ron Barton is a 74 y o  male who is here for hospital follow up  Patient was admitted from 1/21/22-1/28/22 with volume overload  He had developed significant nausea and vomiting requiring IV fluids at the infusion center but then developed shortness of breath and was admitted to the hospital with volume overload  He was initially treated with IV Lasix but eventually started on hemodialysis due to volume overload, hyperkalemia and acidosis     After 2 dialysis sessions it was felt he was recovering and dialysis was stopped  Since discharge patient has been having issues with worsening weight gain, lower extremity edema, scrotal edema and shortness of breath with exertion  His scrotal edema did improve over the past few days but still has lower extremity edema which is worse than usual and is easily out of breath  Denies any nausea, vomiting or diarrhea  He is urinating well  He is currently taking torsemide 40 mg daily  ROS:   A complete review of systems was done  Pertinent positives and negatives as noted in the HPI, otherwise the review of systems is negative      Allergies: Invokana [canagliflozin], Lamisil [terbinafine], Other, and Latex    Medications:   Current Outpatient Medications:     allopurinol (ZYLOPRIM) 300 mg tablet, Take 1 tablet (300 mg total) by mouth every morning, Disp: 90 tablet, Rfl: 0    amLODIPine (NORVASC) 10 mg tablet, Take 1 tablet (10 mg total) by mouth daily, Disp: 90 tablet, Rfl: 3    apixaban (ELIQUIS) 5 mg, Take 1 tablet (5 mg total) by mouth every 12 (twelve) hours, Disp: 180 tablet, Rfl: 3    Dupilumab (Dupixent) 300 MG/2ML SOPN, Inject 300 mg under the skin Once every 2 weeks, Disp: , Rfl:     famotidine (PEPCID) 40 MG tablet, TAKE 1 TABLET BY MOUTH EVERY DAY, Disp: 90 tablet, Rfl: 3    Insulin Degludec-Liraglutide (Xultophy) 100 units-3 6 mg/mL injection pen, Inject 19 units daily  , Disp: 18 mL, Rfl: 1    Insulin Pen Needle (BD Pen Needle Zenaida U/F) 32G X 4 MM MISC, Use 1 daily, Disp: 100 each, Rfl: 2    metoprolol tartrate (LOPRESSOR) 50 mg tablet, Take 1 tablet (50 mg total) by mouth every 12 (twelve) hours, Disp: 180 tablet, Rfl: 3    Multiple Vitamin (MULTIVITAMIN) tablet, Take 1 tablet by mouth daily IN AM, Disp: , Rfl:     omega-3-acid ethyl esters (LOVAZA) 1 g capsule, Take 2 capsules (2 g total) by mouth 2 (two) times a day Mail print prescription to pt, Disp: 360 capsule, Rfl: 3    ondansetron (ZOFRAN) 8 mg tablet, Take 1 tablet (8 mg total) by mouth every 8 (eight) hours as needed for nausea or vomiting, Disp: 20 tablet, Rfl: 3    OneTouch Ultra test strip, USE TO TEST BLOOD SUGAR 3 TIMES A DAY, Disp: 100 each, Rfl: 3    simvastatin (ZOCOR) 20 mg tablet, Take 1 tablet (20 mg total) by mouth daily at bedtime, Disp: 90 tablet, Rfl: 3    sodium bicarbonate 650 mg tablet, Take 1 tablet (650 mg total) by mouth 2 (two) times daily after meals, Disp: 180 tablet, Rfl: 0    tamsulosin (FLOMAX) 0 4 mg, TAKE 1 CAPSULE BY MOUTH EVERY DAY WITH DINNER, Disp: 90 capsule, Rfl: 3    torsemide (DEMADEX) 20 mg tablet, Take 2 tablets (40 mg total) by mouth 2 (two) times a day, Disp: , Rfl: 3    metolazone (ZAROXOLYN) 5 mg tablet, Take 1 tablet (5 mg total) by mouth as needed (1-2 x /week for worsening swelling), Disp: 12 tablet, Rfl: 3    potassium chloride (K-DUR,KLOR-CON) 20 mEq tablet, Take 2 tablets (40 mEq total) by mouth 2 (two) times a day, Disp: , Rfl:     Past Medical History:   Diagnosis Date    Anemia     iron def    Arthritis     OA    Bruise of both arms     forearms and both hands    Bruises easily     [facility] 09/01/2021 colon    [facility]est[facility]     Chronic kidney disease     CPAP (continuous positive airway pressure) dependence     Diabetes mellitus ([facility]     Diabetic foot ulcer ([facility]     Eczema     Erectile dysfunction     Gout     Heart murmur     History of permanent cardiac pacemaker placement 11/2018    Hyperlipidemia     Hypertension     Hypoglycemia 3/8/2019    Murmur     Nephropathy     Osteoarthritis     Pacemaker 11/06/2018    PAD [facility]peripheral artery disease) ([facility]     Seasonal allergies     Sleep apnea     Toe infection     bilat great toes    Vertigo     Walks frequently     Wears dentures     upper    Wears glasses      Past Surgical History:   Procedure Laterality Date    CARDIAC PACEMAKER PLACEMENT      CARPAL TUNNEL RELEASE Left     CARPAL TUNNEL RELEASE Right     CARPAL TUNNEL RELEASE      COLONOSCOPY  08/2020    COLONOSCOPY      FL GUIDED CENTRAL VENOUS ACCESS DEVICE INSERTION  1/11/2022    FOOT AMPUTATION Left 2/10/2020    Procedure: PARTIAL 1ST RAY AMPUTATION;  Surgeon: Amira Ramirez DPM;  [facility];  Service: Podiatry    INCISION AND DRAINAGE OF WOUND Left 1/12/2020    Procedure: INCISION AND DRAINAGE (I&D) EXTREMITY AND REMOVAL OF SESMOID BONE;  Surgeon: Rosina Arevalo DPM;  [facility];  Service: Podiatry    IR OTHER  1/21/2022    IR PICC REPOSITION  1/14/2020    KNEE ARTHROSCOPY Left     KNEE ARTHROSCOPY Right     KNEE SURGERY      SC AMPUTATION TOE,I-P JT Bilateral 5/2/2017    Procedure: PARTIAL AMPUTATION RIGHT AND LEFT HALLUX ;  Surgeon: Amira Ramirez DPM;  [facility];  Service: Podiatry    SC AMPUTATION TOE,MT-P JT Left 7/25/2017    Procedure: 2ND TOE AMPUTATION;  Surgeon: Amira Ramirez DPM;  [facility];  Service: Podiatry    SC INSJ TUNNELED CTR VAD W/SUBQ PORT AGE 5 YR/> N/A 1/11/2022    Procedure: INSERTION VENOUS PORT ( PORT-A-CATH) IR;  Surgeon: Mini Lawson DO;  [facility];  Service: Interventional Radiology    RESECTION LOW ANTERIOR LAPAROSCOPIC N/A 10/21/2021    Procedure: RESECTION LOW ANTERIOR LAPAROSCOPIC;  Surgeon: Reshma House MD;  [facility];  Service: Colorectal    SHOULDER ARTHROSCOPY Left     with screws,RTC    SHOULDER SURGERY      TOE AMPUTATION Left 1/8/2020    Procedure: Junious Medicus;  Surgeon: Stone Guillory DPM;  [facility];  Service: Podiatry    UPPER GASTROINTESTINAL ENDOSCOPY  03/2020    Intestinal metaplasia prepyloric    VASECTOMY       Family History   Problem Relation Age of Onset    Heart disease Father         passed away    Hypertension Father     Pulmonary embolism Father     Hypertension Mother         assed away      reports that he quit smoking about 12 years ago  His smoking use included cigarettes  He started smoking about 44 years ago  He has a 10 00 pack-year smoking history  He has never used smokeless tobacco  He reports that he does not drink alcohol and does not use drugs  Physical Exam:   Vitals:    02/03/22 1216   BP: 148/64   BP Location: Left arm   Patient Position: Sitting   Cuff Size: Standard   Pulse: 68   Resp: 16   Weight: 96 6 kg (213 lb)   Height: 5' 10" (1 778 m)     Body mass index is 30 56 kg/m²      General: no acute distress   Eyes: conjunctivae pink, anicteric sclerae  ENT: mucous membranes moist  Neck: supple, no JVD  Chest:  Decreased breath sounds at bases with some crackles bilaterally  CVS: regular rate and rhythm   Abdomen: soft, non-tender, non-distended  Extremities:  +2 bilateral lower extremity edema below knees  Skin: no rash  Neuro: awake and alert       Lab Results:  Results for orders placed or performed during the hospital encounter of 01/21/22   Home O2 Setup   Result Value Ref Range    Supplier Name [facility]/[address]     Supplier Phone Number [phone]     Order Status Delivery Successful     Delivery Note      Delivery Request Date 01/28/2022     Date Delivered  01/28/2022     Supplier Name 01/28/2022     Item Description       Home Oxygen Concentrator with Portability, Adult, Standard Liter Flow    Item Description Portable Gaseous Oxygen System     Item Description Portable O2 Contents, Gas     Item Description No Conserving Device     Item Description O2 Humidifier Bottle, Standard Liter Flow    MRSA culture    Specimen: Nose; Nares   Result Value Ref Range    MRSA Culture Only       No Methicillin Resistant Staphlyococcus aureus (MRSA) isolated   CBC and differential   Result Value Ref Range    WBC 13 32 (H) 4 31 - 10 16 Thousand/uL    RBC 3 97 3 88 - 5 62 Million/uL    Hemoglobin 12 2 12 0 - 17 0 g/dL    Hematocrit 37 2 36 5 - 49 3 %    MCV 94 82 - 98 fL    MCH 30 7 26 8 - 34 3 pg    MCHC 32 8 31 4 - 37 4 g/dL    RDW 16 9 (H) 11 6 - 15 1 %    MPV 9 7 8 9 - 12 7 fL    Platelets 984 632 - 107 Thousands/uL    nRBC 0 /100 WBCs    Neutrophils Relative 88 (H) 43 - 75 %    Immat GRANS % 1 0 - 2 %    Lymphocytes Relative 7 (L) 14 - 44 %    Monocytes Relative 4 4 - 12 %    Eosinophils Relative 0 0 - 6 %    Basophils Relative 0 0 - 1 %    Neutrophils Absolute 11 82 (H) 1 85 - 7 62 Thousands/µL    Immature Grans Absolute 0 06 0 00 - 0 20 Thousand/uL    Lymphocytes Absolute 0 92 0 60 - 4 47 Thousands/µL    Monocytes Absolute 0 50 0 17 - 1 22 Thousand/µL    Eosinophils Absolute 0 00 0 00 - 0 61 Thousand/µL    Basophils Absolute 0 02 0 00 - 0 10 Thousands/µL   Protime-INR   Result Value Ref Range    Protime 20 3 (H) 11 6 - 14 5 seconds    INR 1 77 (H) 0 84 - 1 19   APTT   Result Value Ref Range    PTT 31 23 - 37 seconds   Comprehensive metabolic panel   Result Value Ref Range    Sodium 131 (L) 135 - 147 mmol/L    Potassium 5 4 (H) 3 5 - 5 3 mmol/L    Chloride 97 96 - 108 mmol/L    CO2 14 (L) 21 - 32 mmol/L    ANION GAP 20 (H) 4 - 13 mmol/L    BUN 98 (H) 5 - 25 mg/dL    Creatinine 5 53 (H) 0 60 - 1 30 mg/dL    Glucose 193 (H) 65 - 140 mg/dL    Calcium 9 1 8 4 - 10 2 mg/dL AST 37 13 - 39 U/L    ALT 37 7 - 52 U/L    Alkaline Phosphatase 50 34 - 104 U/L    Total Protein 6 7 6 4 - 8 4 g/dL    Albumin 3 9 3 5 - 5 0 g/dL    Total Bilirubin 1 14 (H) 0 20 - 1 00 mg/dL    eGFR 9 ml/min/1 73sq m   HS Troponin 0hr (reflex protocol)   Result Value Ref Range    hs TnI 0hr 53 "Refer to ACS Flowchart"- see link ng/L   B-Type Natriuretic Peptide(BNP) CA, GH, EA Campuses Only   Result Value Ref Range    BNP 1,067 (H) 1 - 100 pg/mL   HS Troponin I 2hr   Result Value Ref Range    hs TnI 2hr 60 "Refer to ACS Flowchart"- see link ng/L    Delta 2hr hsTnI 7 ng/L   HS Troponin I 4hr   Result Value Ref Range    hs TnI 4hr 64 "Refer to ACS Flowchart"- see link ng/L    Delta 4hr hsTnI 11 ng/L   Comprehensive metabolic panel   Result Value Ref Range    Sodium 127 (L) 135 - 147 mmol/L    Potassium 5 6 (H) 3 5 - 5 3 mmol/L    Chloride 95 (L) 96 - 108 mmol/L    CO2 17 (L) 21 - 32 mmol/L    ANION GAP 15 (H) 4 - 13 mmol/L     (H) 5 - 25 mg/dL    Creatinine 5 94 (H) 0 60 - 1 30 mg/dL    Glucose 166 (H) 65 - 140 mg/dL    Calcium 9 3 8 4 - 10 2 mg/dL     (H) 13 - 39 U/L     (H) 7 - 52 U/L    Alkaline Phosphatase 48 34 - 104 U/L    Total Protein 6 4 6 4 - 8 4 g/dL    Albumin 3 9 3 5 - 5 0 g/dL    Total Bilirubin 1 10 (H) 0 20 - 1 00 mg/dL    eGFR 8 ml/min/1 73sq m   CBC and differential   Result Value Ref Range    WBC 12 45 (H) 4 31 - 10 16 Thousand/uL    RBC 3 92 3 88 - 5 62 Million/uL    Hemoglobin 12 0 12 0 - 17 0 g/dL    Hematocrit 34 8 (L) 36 5 - 49 3 %    MCV 89 82 - 98 fL    MCH 30 6 26 8 - 34 3 pg    MCHC 34 5 31 4 - 37 4 g/dL    RDW 16 4 (H) 11 6 - 15 1 %    MPV 9 4 8 9 - 12 7 fL    Platelets 405 647 - 843 Thousands/uL   Magnesium   Result Value Ref Range    Magnesium 2 4 1 9 - 2 7 mg/dL   Phosphorus   Result Value Ref Range    Phosphorus 9 1 (H) 2 3 - 4 1 mg/dL   Lactic acid, plasma   Result Value Ref Range    LACTIC ACID 1 2 0 5 - 2 0 mmol/L   Basic metabolic panel   Result Value Ref Range    Sodium 126 (L) 135 - 147 mmol/L    Potassium 6 0 (H) 3 5 - 5 3 mmol/L    Chloride 94 (L) 96 - 108 mmol/L    CO2 17 (L) 21 - 32 mmol/L    ANION GAP 15 (H) 4 - 13 mmol/L     (H) 5 - 25 mg/dL    Creatinine 5 89 (H) 0 60 - 1 30 mg/dL    Glucose 153 (H) 65 - 140 mg/dL    Calcium 9 4 8 4 - 10 2 mg/dL    eGFR 9 ml/min/1 73sq m   Basic metabolic panel   Result Value Ref Range    Sodium 128 (L) 135 - 147 mmol/L    Potassium 5 0 3 5 - 5 3 mmol/L    Chloride 94 (L) 96 - 108 mmol/L    CO2 17 (L) 21 - 32 mmol/L    ANION GAP 17 (H) 4 - 13 mmol/L     (H) 5 - 25 mg/dL    Creatinine 5 96 (H) 0 60 - 1 30 mg/dL    Glucose 121 65 - 140 mg/dL    Calcium 9 3 8 4 - 10 2 mg/dL    eGFR 8 ml/min/1 73sq m   CK (with reflex to MB)   Result Value Ref Range    Total CK 65 39 - 308 U/L   Urinalysis with microscopic   Result Value Ref Range    Clarity, UA Clear Hazy, Clear    Color, UA Yellow Yellow, Straw    Specific [place], UA >=1 030 (H) >1 005-<1 030    pH, UA 5 5 5 0, 5 5, 6 0, 6 5, 7 0, 7 5    Glucose, UA Negative Negative mg/dl    Ketones, UA Negative Negative mg/dl    Blood, UA Trace-Intact (A) Negative    Protein, UA 2+ (A) Negative, Interference- unable to analyze mg/dl    Nitrite, UA Negative Negative    Bilirubin, UA Negative Negative    Urobilinogen, UA 0 2 0 2, 1 0 E U /dl E U /dl    Leukocytes, UA Negative Negative    WBC, UA 0-1 (A) None Seen, 2-4, 5-60 /hpf    RBC, UA 0-1 (A) None Seen, 2-4 /hpf    Hyaline Casts, UA 0-1 (A) (none) /lpf    Bacteria, UA Occasional None Seen, Occasional /hpf    Epithelial Cells None Seen None Seen, Occasional /hpf    MUCUS THREADS Occasional (A) None Seen   Sodium, urine, random   Result Value Ref Range    Sodium, Ur 15    Creatinine, urine, random   Result Value Ref Range    Creatinine, Ur 85 7 mg/dL   CBC and differential   Result Value Ref Range    WBC 12 50 (H) 4 31 - 10 16 Thousand/uL    RBC 3 71 (L) 3 88 - 5 62 Million/uL    Hemoglobin 11 5 (L) 12 0 - 17 0 g/dL Hematocrit 33 7 (L) 36 5 - 49 3 %    MCV 91 82 - 98 fL    MCH 31 0 26 8 - 34 3 pg    MCHC 34 1 31 4 - 37 4 g/dL    RDW 16 1 (H) 11 6 - 15 1 %    MPV 9 7 8 9 - 12 7 fL    Platelets 271 842 - 400 Thousands/uL   Basic metabolic panel   Result Value Ref Range    Sodium 127 (L) 135 - 147 mmol/L    Potassium 4 8 3 5 - 5 3 mmol/L    Chloride 91 (L) 96 - 108 mmol/L    CO2 14 (L) 21 - 32 mmol/L    ANION GAP 22 (H) 4 - 13 mmol/L     (H) 5 - 25 mg/dL    Creatinine 6 14 (H) 0 60 - 1 30 mg/dL    Glucose 153 (H) 65 - 140 mg/dL    Calcium 9 3 8 4 - 10 2 mg/dL    eGFR 8 ml/min/1 73sq m   Hepatitis Panel (IP Renal Unit)   Result Value Ref Range    Hepatitis B Surface Ag Non-reactive Non-reactive, NonReactive - Confirmed    Hep B S Ab <3 10 mIU/mL    Hepatitis C Ab Non-reactive Non-reactive    Hep B C IgM Non-reactive Non-reactive    Hep B Core Total Ab Non-reactive Non-reactive   CBC and differential   Result Value Ref Range    WBC 8 34 4 31 - 10 16 Thousand/uL    RBC 3 18 (L) 3 88 - 5 62 Million/uL    Hemoglobin 9 6 (L) 12 0 - 17 0 g/dL    Hematocrit 27 8 (L) 36 5 - 49 3 %    MCV 87 82 - 98 fL    MCH 30 2 26 8 - 34 3 pg    MCHC 34 5 31 4 - 37 4 g/dL    RDW 15 9 (H) 11 6 - 15 1 %    MPV 9 3 8 9 - 12 7 fL    Platelets 566 626 - 236 Thousands/uL   Basic metabolic panel   Result Value Ref Range    Sodium 132 (L) 135 - 147 mmol/L    Potassium 3 5 3 5 - 5 3 mmol/L    Chloride 92 (L) 96 - 108 mmol/L    CO2 26 21 - 32 mmol/L    ANION GAP 14 (H) 4 - 13 mmol/L    BUN 93 (H) 5 - 25 mg/dL    Creatinine 4 87 (H) 0 60 - 1 30 mg/dL    Glucose 152 (H) 65 - 140 mg/dL    Calcium 8 5 8 4 - 10 2 mg/dL    eGFR 11 ml/min/1 73sq m   Magnesium   Result Value Ref Range    Magnesium 2 5 1 9 - 2 7 mg/dL   Phosphorus   Result Value Ref Range    Phosphorus 6 6 (H) 2 3 - 4 1 mg/dL   Basic metabolic panel   Result Value Ref Range    Sodium 129 (L) 135 - 147 mmol/L    Potassium 3 2 (L) 3 5 - 5 3 mmol/L    Chloride 94 (L) 96 - 108 mmol/L    CO2 31 21 - 32 mmol/L    ANION GAP 4 4 - 13 mmol/L    BUN 50 (H) 5 - 25 mg/dL    Creatinine 3 60 (H) 0 60 - 1 30 mg/dL    Glucose 95 65 - 140 mg/dL    Calcium 8 1 (L) 8 4 - 10 2 mg/dL    eGFR 16 ml/min/1 73sq m   CBC and differential   Result Value Ref Range    WBC 7 03 4 31 - 10 16 Thousand/uL    RBC 3 22 (L) 3 88 - 5 62 Million/uL    Hemoglobin 10 0 (L) 12 0 - 17 0 g/dL    Hematocrit 29 5 (L) 36 5 - 49 3 %    MCV 92 82 - 98 fL    MCH 31 1 26 8 - 34 3 pg    MCHC 33 9 31 4 - 37 4 g/dL    RDW 15 8 (H) 11 6 - 15 1 %    MPV 9 9 8 9 - 12 7 fL    Platelets 421 485 - 173 Thousands/uL    nRBC 1 /100 WBCs    Neutrophils Relative 67 43 - 75 %    Immat GRANS % 1 0 - 2 %    Lymphocytes Relative 17 14 - 44 %    Monocytes Relative 11 4 - 12 %    Eosinophils Relative 4 0 - 6 %    Basophils Relative 0 0 - 1 %    Neutrophils Absolute 4 67 1 85 - 7 62 Thousands/µL    Immature Grans Absolute 0 09 0 00 - 0 20 Thousand/uL    Lymphocytes Absolute 1 16 0 60 - 4 47 Thousands/µL    Monocytes Absolute 0 80 0 17 - 1 22 Thousand/µL    Eosinophils Absolute 0 30 0 00 - 0 61 Thousand/µL    Basophils Absolute 0 01 0 00 - 0 10 Thousands/µL   Basic metabolic panel   Result Value Ref Range    Sodium 131 (L) 135 - 147 mmol/L    Potassium 3 3 (L) 3 5 - 5 3 mmol/L    Chloride 93 (L) 96 - 108 mmol/L    CO2 27 21 - 32 mmol/L    ANION GAP 11 4 - 13 mmol/L    BUN 54 (H) 5 - 25 mg/dL    Creatinine 3 66 (H) 0 60 - 1 30 mg/dL    Glucose 130 65 - 140 mg/dL    Calcium 8 5 8 4 - 10 2 mg/dL    eGFR 16 ml/min/1 73sq m   Magnesium   Result Value Ref Range    Magnesium 2 1 1 9 - 2 7 mg/dL   Phosphorus   Result Value Ref Range    Phosphorus 3 2 2 3 - 4 1 mg/dL   CBC   Result Value Ref Range    WBC 10 96 (H) 4 31 - 10 16 Thousand/uL    RBC 3 60 (L) 3 88 - 5 62 Million/uL    Hemoglobin 11 1 (L) 12 0 - 17 0 g/dL    Hematocrit 32 6 (L) 36 5 - 49 3 %    MCV 91 82 - 98 fL    MCH 30 8 26 8 - 34 3 pg    MCHC 34 0 31 4 - 37 4 g/dL    RDW 15 7 (H) 11 6 - 15 1 %    Platelets 664 211 - 492 Thousands/uL    MPV 10 5 8 9 - 12 7 fL   CBC and differential   Result Value Ref Range    WBC 11 89 (H) 4 31 - 10 16 Thousand/uL    RBC 3 25 (L) 3 88 - 5 62 Million/uL    Hemoglobin 10 2 (L) 12 0 - 17 0 g/dL    Hematocrit 29 8 (L) 36 5 - 49 3 %    MCV 92 82 - 98 fL    MCH 31 4 26 8 - 34 3 pg    MCHC 34 2 31 4 - 37 4 g/dL    RDW 16 2 (H) 11 6 - 15 1 %    MPV 9 8 8 9 - 12 7 fL    Platelets 186 761 - 497 Thousands/uL    nRBC 0 /100 WBCs    Neutrophils Relative 77 (H) 43 - 75 %    Immat GRANS % 2 0 - 2 %    Lymphocytes Relative 8 (L) 14 - 44 %    Monocytes Relative 12 4 - 12 %    Eosinophils Relative 1 0 - 6 %    Basophils Relative 0 0 - 1 %    Neutrophils Absolute 9 03 (H) 1 85 - 7 62 Thousands/µL    Immature Grans Absolute 0 22 (H) 0 00 - 0 20 Thousand/uL    Lymphocytes Absolute 0 99 0 60 - 4 47 Thousands/µL    Monocytes Absolute 1 47 (H) 0 17 - 1 22 Thousand/µL    Eosinophils Absolute 0 16 0 00 - 0 61 Thousand/µL    Basophils Absolute 0 02 0 00 - 0 10 Thousands/µL   Basic metabolic panel   Result Value Ref Range    Sodium 129 (L) 135 - 147 mmol/L    Potassium 3 3 (L) 3 5 - 5 3 mmol/L    Chloride 91 (L) 96 - 108 mmol/L    CO2 29 21 - 32 mmol/L    ANION GAP 9 4 - 13 mmol/L    BUN 61 (H) 5 - 25 mg/dL    Creatinine 3 89 (H) 0 60 - 1 30 mg/dL    Glucose 137 65 - 140 mg/dL    Calcium 7 9 (L) 8 4 - 10 2 mg/dL    eGFR 14 ml/min/1 73sq m   Basic metabolic panel   Result Value Ref Range    Sodium 129 (L) 135 - 147 mmol/L    Potassium 3 3 (L) 3 5 - 5 3 mmol/L    Chloride 93 (L) 96 - 108 mmol/L    CO2 26 21 - 32 mmol/L    ANION GAP 10 4 - 13 mmol/L    BUN 59 (H) 5 - 25 mg/dL    Creatinine 3 51 (H) 0 60 - 1 30 mg/dL    Glucose 123 65 - 140 mg/dL    Calcium 8 2 (L) 8 4 - 10 2 mg/dL    eGFR 16 ml/min/1 73sq m   ECG 12 lead   Result Value Ref Range    Ventricular Rate 63 BPM    Atrial Rate 220 BPM    MN Interval  ms    QRSD Interval 184 ms    QT Interval 482 ms    QTC Interval 493 ms    P Axis  degrees    QRS Axis -75 degrees    T Wave Axis 64 degrees   ECG 12 lead   Result Value Ref Range    Ventricular Rate 60 BPM    Atrial Rate 60 BPM    IL Interval  ms    QRSD Interval 186 ms    QT Interval 488 ms    QTC Interval 488 ms    P Axis 83 degrees    QRS Axis -78 degrees    T Wave Axis 61 degrees   ECG 12 lead   Result Value Ref Range    Ventricular Rate 64 BPM    Atrial Rate 241 BPM    IL Interval  ms    QRSD Interval 192 ms    QT Interval 478 ms    QTC Interval 493 ms    P Axis  degrees    QRS Axis -72 degrees    T Wave Axis 71 degrees   Fingerstick Glucose (POCT)   Result Value Ref Range    POC Glucose 196 (H) 65 - 140 mg/dl   Fingerstick Glucose (POCT)   Result Value Ref Range    POC Glucose 177 (H) 65 - 140 mg/dl   Fingerstick Glucose (POCT)   Result Value Ref Range    POC Glucose 222 (H) 65 - 140 mg/dl   Fingerstick Glucose (POCT)   Result Value Ref Range    POC Glucose 173 (H) 65 - 140 mg/dl   Fingerstick Glucose (POCT)   Result Value Ref Range    POC Glucose 144 (H) 65 - 140 mg/dl   Fingerstick Glucose (POCT)   Result Value Ref Range    POC Glucose 167 (H) 65 - 140 mg/dl   Fingerstick Glucose (POCT)   Result Value Ref Range    POC Glucose 151 (H) 65 - 140 mg/dl   Fingerstick Glucose (POCT)   Result Value Ref Range    POC Glucose 169 (H) 65 - 140 mg/dl   Fingerstick Glucose (POCT)   Result Value Ref Range    POC Glucose 242 (H) 65 - 140 mg/dl   Fingerstick Glucose (POCT)   Result Value Ref Range    POC Glucose 190 (H) 65 - 140 mg/dl   Fingerstick Glucose (POCT)   Result Value Ref Range    POC Glucose 251 (H) 65 - 140 mg/dl   Fingerstick Glucose (POCT)   Result Value Ref Range    POC Glucose 158 (H) 65 - 140 mg/dl   Fingerstick Glucose (POCT)   Result Value Ref Range    POC Glucose 178 (H) 65 - 140 mg/dl   Fingerstick Glucose (POCT)   Result Value Ref Range    POC Glucose 94 65 - 140 mg/dl   Fingerstick Glucose (POCT)   Result Value Ref Range    POC Glucose 259 (H) 65 - 140 mg/dl   Fingerstick Glucose (POCT)   Result Value Ref Range    POC Glucose 218 (H) 65 - 140 mg/dl   Fingerstick Glucose (POCT)   Result Value Ref Range    POC Glucose 213 (H) 65 - 140 mg/dl   Fingerstick Glucose (POCT)   Result Value Ref Range    POC Glucose 133 65 - 140 mg/dl   Fingerstick Glucose (POCT)   Result Value Ref Range    POC Glucose 236 (H) 65 - 140 mg/dl   Fingerstick Glucose (POCT)   Result Value Ref Range    POC Glucose 218 (H) 65 - 140 mg/dl   Fingerstick Glucose (POCT)   Result Value Ref Range    POC Glucose 210 (H) 65 - 140 mg/dl   Fingerstick Glucose (POCT)   Result Value Ref Range    POC Glucose 135 65 - 140 mg/dl   Fingerstick Glucose (POCT)   Result Value Ref Range    POC Glucose 193 (H) 65 - 140 mg/dl   Fingerstick Glucose (POCT)   Result Value Ref Range    POC Glucose 229 (H) 65 - 140 mg/dl   Fingerstick Glucose (POCT)   Result Value Ref Range    POC Glucose 183 (H) 65 - 140 mg/dl   Fingerstick Glucose (POCT)   Result Value Ref Range    POC Glucose 160 (H) 65 - 140 mg/dl   Fingerstick Glucose (POCT)   Result Value Ref Range    POC Glucose 183 (H) 65 - 140 mg/dl   Manual Differential(PHLEBS Do Not Order)   Result Value Ref Range    Segmented % 84 (H) 43 - 75 %    Lymphocytes % 14 14 - 44 %    Monocytes % 2 (L) 4 - 12 %    Eosinophils, % 0 0 - 6 %    Basophils % 0 0 - 1 %    Absolute Neutrophils 10 46 (H) 1 85 - 7 62 Thousand/uL    Lymphocytes Absolute 1 74 0 60 - 4 47 Thousand/uL    Monocytes Absolute 0 25 0 00 - 1 22 Thousand/uL    Eosinophils Absolute 0 00 0 00 - 0 40 Thousand/uL    Basophils Absolute 0 00 0 00 - 0 10 Thousand/uL    Total Counted      RBC Morphology Present     Anisocytosis Present     Ovalocytes Present     Poikilocytes Present     Schistocytes Present     Platelet Estimate Adequate Adequate     *Note: Due to a large number of results and/or encounters for the requested time period, some results have not been displayed  A complete set of results can be found in Results Review         Results from last 7 days   Lab Units 01/28/22  0654 SODIUM mmol/L 129*   POTASSIUM mmol/L 3 3*   CHLORIDE mmol/L 93*   CO2 mmol/L 26   BUN mg/dL 59*   CREATININE mg/dL 3 51*   CALCIUM mg/dL 8 2*         Portions of the record may have been created with voice recognition software  Occasional wrong word or "sound a like" substitutions may have occurred due to the inherent limitations of voice recognition software  Read the chart carefully and recognize, using context, where substitutions have occurred  If you have any questions, please contact the dictating provider

## 2022-02-03 NOTE — TELEPHONE ENCOUNTER
Patient is due for a pre- eval f/u with Dr Kieran Horn  Patient was seen today 02/03 - I spoke with patient to schedule the appointment and he stated that he can no longer schedule due to him having cancer

## 2022-02-07 ENCOUNTER — PATIENT MESSAGE (OUTPATIENT)
Dept: NEPHROLOGY | Facility: CLINIC | Age: 67
End: 2022-02-07

## 2022-02-07 DIAGNOSIS — E87.6 HYPOKALEMIA: ICD-10-CM

## 2022-02-07 RX ORDER — POTASSIUM CHLORIDE 20 MEQ/1
40 TABLET, EXTENDED RELEASE ORAL 2 TIMES DAILY
Qty: 120 TABLET | Refills: 3
Start: 2022-02-07 | End: 2022-02-08 | Stop reason: SDUPTHER

## 2022-02-08 RX ORDER — POTASSIUM CHLORIDE 20 MEQ/1
40 TABLET, EXTENDED RELEASE ORAL 2 TIMES DAILY
Qty: 120 TABLET | Refills: 3 | Status: SHIPPED | OUTPATIENT
Start: 2022-02-08 | End: 2022-02-11

## 2022-02-11 ENCOUNTER — APPOINTMENT (OUTPATIENT)
Dept: LAB | Facility: CLINIC | Age: 67
End: 2022-02-11
Payer: MEDICARE

## 2022-02-11 DIAGNOSIS — N18.4 CKD (CHRONIC KIDNEY DISEASE), STAGE IV (HCC): ICD-10-CM

## 2022-02-11 RX ORDER — TORSEMIDE 20 MG/1
40 TABLET ORAL 2 TIMES DAILY
Refills: 3
Start: 2022-02-11 | End: 2022-04-07 | Stop reason: SDUPTHER

## 2022-02-18 ENCOUNTER — APPOINTMENT (OUTPATIENT)
Dept: LAB | Facility: CLINIC | Age: 67
End: 2022-02-18
Payer: MEDICARE

## 2022-02-18 DIAGNOSIS — C18.7 MALIGNANT NEOPLASM OF SIGMOID COLON (HCC): ICD-10-CM

## 2022-02-18 DIAGNOSIS — N18.4 STAGE 4 CHRONIC KIDNEY DISEASE (HCC): ICD-10-CM

## 2022-02-18 DIAGNOSIS — Z79.4 TYPE 2 DIABETES MELLITUS WITH STAGE 4 CHRONIC KIDNEY DISEASE, WITH LONG-TERM CURRENT USE OF INSULIN (HCC): ICD-10-CM

## 2022-02-18 DIAGNOSIS — N18.4 TYPE 2 DIABETES MELLITUS WITH STAGE 4 CHRONIC KIDNEY DISEASE, WITH LONG-TERM CURRENT USE OF INSULIN (HCC): ICD-10-CM

## 2022-02-18 DIAGNOSIS — N18.4 CKD (CHRONIC KIDNEY DISEASE), STAGE IV (HCC): ICD-10-CM

## 2022-02-18 DIAGNOSIS — E11.22 TYPE 2 DIABETES MELLITUS WITH STAGE 4 CHRONIC KIDNEY DISEASE, WITH LONG-TERM CURRENT USE OF INSULIN (HCC): ICD-10-CM

## 2022-02-18 DIAGNOSIS — D50.9 IRON DEFICIENCY ANEMIA, UNSPECIFIED IRON DEFICIENCY ANEMIA TYPE: ICD-10-CM

## 2022-02-18 LAB
ALBUMIN SERPL BCP-MCNC: 3.3 G/DL (ref 3.5–5)
ALP SERPL-CCNC: 114 U/L (ref 46–116)
ALT SERPL W P-5'-P-CCNC: 22 U/L (ref 12–78)
ANION GAP SERPL CALCULATED.3IONS-SCNC: 6 MMOL/L (ref 4–13)
AST SERPL W P-5'-P-CCNC: 19 U/L (ref 5–45)
BASOPHILS # BLD AUTO: 0.08 THOUSANDS/ΜL (ref 0–0.1)
BASOPHILS NFR BLD AUTO: 1 % (ref 0–1)
BILIRUB SERPL-MCNC: 1.17 MG/DL (ref 0.2–1)
BUN SERPL-MCNC: 54 MG/DL (ref 5–25)
CALCIUM ALBUM COR SERPL-MCNC: 10.7 MG/DL (ref 8.3–10.1)
CALCIUM SERPL-MCNC: 10.1 MG/DL (ref 8.3–10.1)
CHLORIDE SERPL-SCNC: 104 MMOL/L (ref 100–108)
CO2 SERPL-SCNC: 25 MMOL/L (ref 21–32)
CREAT SERPL-MCNC: 3.74 MG/DL (ref 0.6–1.3)
EOSINOPHIL # BLD AUTO: 0.48 THOUSAND/ΜL (ref 0–0.61)
EOSINOPHIL NFR BLD AUTO: 4 % (ref 0–6)
ERYTHROCYTE [DISTWIDTH] IN BLOOD BY AUTOMATED COUNT: 16.6 % (ref 11.6–15.1)
EST. AVERAGE GLUCOSE BLD GHB EST-MCNC: 126 MG/DL
FERRITIN SERPL-MCNC: 665 NG/ML (ref 8–388)
GFR SERPL CREATININE-BSD FRML MDRD: 15 ML/MIN/1.73SQ M
GLUCOSE P FAST SERPL-MCNC: 125 MG/DL (ref 65–99)
HBA1C MFR BLD: 6 %
HCT VFR BLD AUTO: 37.3 % (ref 36.5–49.3)
HGB BLD-MCNC: 12.3 G/DL (ref 12–17)
IMM GRANULOCYTES # BLD AUTO: 0.1 THOUSAND/UL (ref 0–0.2)
IMM GRANULOCYTES NFR BLD AUTO: 1 % (ref 0–2)
IRON SATN MFR SERPL: 25 % (ref 20–50)
IRON SERPL-MCNC: 80 UG/DL (ref 65–175)
LYMPHOCYTES # BLD AUTO: 1.22 THOUSANDS/ΜL (ref 0.6–4.47)
LYMPHOCYTES NFR BLD AUTO: 10 % (ref 14–44)
MAGNESIUM SERPL-MCNC: 2.1 MG/DL (ref 1.6–2.6)
MCH RBC QN AUTO: 30.7 PG (ref 26.8–34.3)
MCHC RBC AUTO-ENTMCNC: 33 G/DL (ref 31.4–37.4)
MCV RBC AUTO: 93 FL (ref 82–98)
MONOCYTES # BLD AUTO: 0.94 THOUSAND/ΜL (ref 0.17–1.22)
MONOCYTES NFR BLD AUTO: 7 % (ref 4–12)
NEUTROPHILS # BLD AUTO: 9.92 THOUSANDS/ΜL (ref 1.85–7.62)
NEUTS SEG NFR BLD AUTO: 77 % (ref 43–75)
NRBC BLD AUTO-RTO: 0 /100 WBCS
PHOSPHATE SERPL-MCNC: 4.2 MG/DL (ref 2.3–4.1)
PLATELET # BLD AUTO: 271 THOUSANDS/UL (ref 149–390)
PMV BLD AUTO: 10.4 FL (ref 8.9–12.7)
POTASSIUM SERPL-SCNC: 4.4 MMOL/L (ref 3.5–5.3)
PROT SERPL-MCNC: 8.2 G/DL (ref 6.4–8.2)
RBC # BLD AUTO: 4.01 MILLION/UL (ref 3.88–5.62)
SODIUM SERPL-SCNC: 135 MMOL/L (ref 136–145)
TIBC SERPL-MCNC: 321 UG/DL (ref 250–450)
WBC # BLD AUTO: 12.74 THOUSAND/UL (ref 4.31–10.16)

## 2022-02-18 PROCEDURE — 84100 ASSAY OF PHOSPHORUS: CPT

## 2022-02-18 PROCEDURE — 83036 HEMOGLOBIN GLYCOSYLATED A1C: CPT

## 2022-02-18 PROCEDURE — 82728 ASSAY OF FERRITIN: CPT

## 2022-02-18 PROCEDURE — 36415 COLL VENOUS BLD VENIPUNCTURE: CPT

## 2022-02-18 PROCEDURE — 83550 IRON BINDING TEST: CPT

## 2022-02-18 PROCEDURE — 80053 COMPREHEN METABOLIC PANEL: CPT

## 2022-02-18 PROCEDURE — 83540 ASSAY OF IRON: CPT

## 2022-02-18 PROCEDURE — 83735 ASSAY OF MAGNESIUM: CPT

## 2022-02-18 PROCEDURE — 85025 COMPLETE CBC W/AUTO DIFF WBC: CPT

## 2022-02-21 ENCOUNTER — TELEPHONE (OUTPATIENT)
Dept: NEPHROLOGY | Facility: CLINIC | Age: 67
End: 2022-02-21

## 2022-02-21 NOTE — TELEPHONE ENCOUNTER
LM with patient to reschedule appointment originally on 2/28 in QO with Dr Silver Michel due to a change in providers schedule  Please schedule for first available opening in QO

## 2022-02-22 ENCOUNTER — TELEPHONE (OUTPATIENT)
Dept: NEPHROLOGY | Facility: CLINIC | Age: 67
End: 2022-02-22

## 2022-02-22 NOTE — TELEPHONE ENCOUNTER
----- Message from Betty Jaeger, DO sent at 2/22/2022  2:15 PM EST -----  New appointment date should be fine repeat blood work ordered for March if he does not please have him obtain a CMP, CBC thank you

## 2022-02-22 NOTE — TELEPHONE ENCOUNTER
Reschedule Appointment   Person speaking to  Rosy Torrez   Date of original appointment 2/28/22   New appointment date 4/7/22   Patient on dialysis No   Location [facility]   Provider Karla Yee   Additional Information          Received a call from patient's wife regarding rescheduling his appointment due to a change in provider's schedule  Patient's wife [place] wanted to voice her concern with how delayed this follow up has been; appointment has been postponed 2x  They would like his chart to be reviewed by Dr Karla Yee

## 2022-02-22 NOTE — TELEPHONE ENCOUNTER
I spoke with patient's wife Destinee Nelson she stated he is going to have lab work done this Friday and that he has been having blood work every two weeks  She wants to know if he has to continue doing labs every two weeks? Please advise

## 2022-02-23 ENCOUNTER — CONSULT (OUTPATIENT)
Dept: HEMATOLOGY ONCOLOGY | Facility: CLINIC | Age: 67
End: 2022-02-23
Payer: MEDICARE

## 2022-02-23 VITALS
TEMPERATURE: 97.8 F | BODY MASS INDEX: 28.35 KG/M2 | OXYGEN SATURATION: 97 % | HEART RATE: 74 BPM | WEIGHT: 198 LBS | HEIGHT: 70 IN | RESPIRATION RATE: 16 BRPM | SYSTOLIC BLOOD PRESSURE: 128 MMHG | DIASTOLIC BLOOD PRESSURE: 70 MMHG

## 2022-02-23 DIAGNOSIS — Z79.4 TYPE 2 DIABETES MELLITUS WITH STAGE 4 CHRONIC KIDNEY DISEASE, WITH LONG-TERM CURRENT USE OF INSULIN (HCC): ICD-10-CM

## 2022-02-23 DIAGNOSIS — R01.1 SYSTOLIC MURMUR: ICD-10-CM

## 2022-02-23 DIAGNOSIS — T45.1X5A CHEMOTHERAPY-INDUCED NAUSEA: ICD-10-CM

## 2022-02-23 DIAGNOSIS — N18.4 STAGE 4 CHRONIC KIDNEY DISEASE (HCC): ICD-10-CM

## 2022-02-23 DIAGNOSIS — C18.7 MALIGNANT NEOPLASM OF SIGMOID COLON (HCC): Primary | ICD-10-CM

## 2022-02-23 DIAGNOSIS — E11.40 TYPE 2 DIABETES MELLITUS WITH DIABETIC NEUROPATHY, WITHOUT LONG-TERM CURRENT USE OF INSULIN (HCC): ICD-10-CM

## 2022-02-23 DIAGNOSIS — I50.33 ACUTE ON CHRONIC DIASTOLIC CONGESTIVE HEART FAILURE (HCC): ICD-10-CM

## 2022-02-23 DIAGNOSIS — S98.112A AMPUTATION OF LEFT GREAT TOE (HCC): ICD-10-CM

## 2022-02-23 DIAGNOSIS — R11.0 CHEMOTHERAPY-INDUCED NAUSEA: ICD-10-CM

## 2022-02-23 DIAGNOSIS — N18.4 TYPE 2 DIABETES MELLITUS WITH STAGE 4 CHRONIC KIDNEY DISEASE, WITH LONG-TERM CURRENT USE OF INSULIN (HCC): ICD-10-CM

## 2022-02-23 DIAGNOSIS — E11.22 TYPE 2 DIABETES MELLITUS WITH STAGE 4 CHRONIC KIDNEY DISEASE, WITH LONG-TERM CURRENT USE OF INSULIN (HCC): ICD-10-CM

## 2022-02-23 PROCEDURE — 99214 OFFICE O/P EST MOD 30 MIN: CPT | Performed by: INTERNAL MEDICINE

## 2022-02-23 NOTE — PROGRESS NOTES
Established Patient Progress Note      Chief Complaint   Patient presents with    Diabetes Type 2        History of Present Illness:   Kimberley Lombardo is a 74 y o  male with HTN, HLD, NICOLASA, and type 2 diabetes with long term use of insulin  Reports complications of neuropathy and CKD with macroalbuminuria and diabetic foot ulcer  Denies recent illness or hospitalizations  Denies recent severe hypoglycemic or severe hyperglycemic episodes  Denies any issues with his current regimen  home glucose monitoring: are performed sporadically, approximately 4-5x weekly    Component      Latest Ref Rng & Units 11/29/2021 12/14/2021              Hemoglobin A1C      Normal 3 8-5 6%; PreDiabetic 5 7-6 4%; Diabetic >=6 5%; Glycemic control for adults with diabetes <7 0% % 6 0 (H) 5 7 (H)   eAG, EST AVG Glucose      mg/dl 126 117     Component      Latest Ref Rng & Units 2/18/2022             Hemoglobin A1C      Normal 3 8-5 6%; PreDiabetic 5 7-6 4%; Diabetic >=6 5%; Glycemic control for adults with diabetes <7 0% % 6 0 (H)   eAG, EST AVG Glucose      mg/dl 126     Since patient's last appointment on 08/18/2021, he has had multiple hospital admissions for acute kidney injury in the setting of RSV, then status post contrast dye administration  He underwent colon cancer surgery 11/24/2021  He ultimately received a venous port on 01/11/2022 for chemotherapy  On 1/18/2022, he received his first dose of FOLFOX and subsequently required hydration and IV antiemetics  He was hospitalized again on 01/21/2022 with acute on chronic diastolic congestive heart failure and ELZBIETA superimposed on CKD after experiencing nausea, vomiting, worsening of renal function, and confusion  Today, patient reports feeling well  He denies any s/e from his xultophy  He will be restarting a lower dose of chemotherapy in the near future         Current regimen:   Xultophy 19 units daily    Last Eye Exam: every March  Last Foot Exam: UTD    For hypertension, he is taking 10 mg of amlodipine daily and 50 mg of metoprolol tartrate twice daily  He denies headache, pedal edema, and orthostatic hypotension  For hyperlipidemia, he is taking 20 mg of simvastatin nightly  He denies myalgias      Patient Active Problem List   Diagnosis    Bilateral leg edema    Diabetic ulcer of left foot [facility])    Easy bruising    Eczema    Erectile dysfunction of non-organic origin    Gout    Hyperlipidemia    Uremic acidosis    Arthritis    Peripheral arterial disease ([facility])    PVCs (premature ventricular contractions)    Rhinitis    Systolic murmur    Vertigo    Complete heart block ([facility])    Metabolic acidosis    Acute kidney injury ([facility] [address] [facility]    Other hyperlipidemia    PAF (paroxysmal atrial fibrillation) ([facility])    Persistent proteinuria    Volume overload    Urinary retention    NICOLASA (obstructive sleep apnea)    Type 2 diabetes mellitus with chronic kidney disease, with long-term current use of insulin ([facility])    Hypertension    Stage 4 chronic kidney disease ([facility])    Nonrheumatic aortic valve stenosis    Anemia    Fever    Mitral valve stenosis    Abnormal CT of the chest    Hyperkalemia    Acute pulmonary edema ([facility])    Chronic diastolic (congestive) heart failure ([facility])    Left renal artery stenosis ([facility])    S/P amputation of lesser toe, left ([facility])    S/P amputation of lesser toe, right ([facility])    Elevated troponin I level    Staphylococcus aureus bacteremia    Type 2 diabetes mellitus with diabetic neuropathy, without long-term current use of insulin ([facility])    Hyponatremia    Iron deficiency anemia    Anxiety    Osteomyelitis of left foot ([facility] [address] [facility]    Amputation of left great toe ([facility])    Multiple drug resistant organism (MDRO) culture positive    Renovascular hypertension    SSS (sick sinus syndrome) ([facility])    Chronic obstructive pulmonary disease ([facility])    Absence of toe ([facility])    Chronic painful diabetic neuropathy ([facility])    Onychomycosis  Colon cancer ([place] [address] )    Acute kidney injury superimposed on chronic kidney disease (HCC)    RSV (respiratory syncytial virus pneumonia)    Chemotherapy-induced neutropenia (HCC)    Chemotherapy-induced nausea    Acute on chronic diastolic congestive heart failure (HCC)      Past Medical History:   Diagnosis Date    Anemia     iron def    Arthritis     OA    Bruise of both arms     forearms and both hands    Bruises easily     Cancer ([place] [address] ) 09/01/2021    colon    CHF (congestive heart failure) (HCC)     Chronic kidney disease     CPAP (continuous positive airway pressure) dependence     Diabetes mellitus ([place] [address] )     Diabetic foot ulcer (HCC)     Eczema     Erectile dysfunction     Gout     Heart murmur     History of permanent cardiac pacemaker placement 11/2018    Hyperlipidemia     Hypertension     Hypoglycemia 3/8/2019    Murmur     Nephropathy     Osteoarthritis     Pacemaker 11/06/2018    [facility]peripheral artery [facility]) [facility])     Seasonal allergies     Sleep apnea     Toe infection     bilat great toes    Vertigo     Walks frequently     Wears dentures     upper    Wears glasses       Past Surgical History:   Procedure Laterality Date    CARDIAC PACEMAKER PLACEMENT      CARPAL TUNNEL RELEASE Left     CARPAL TUNNEL RELEASE Right     CARPAL TUNNEL RELEASE      COLONOSCOPY  08/2020    COLONOSCOPY      FL GUIDED CENTRAL VENOUS ACCESS DEVICE INSERTION  1/11/2022    FOOT AMPUTATION Left 2/10/2020    Procedure: PARTIAL 1ST RAY AMPUTATION;  Surgeon: Chao Velazquez DPM;  [facility];  Service: Podiatry    INCISION AND DRAINAGE OF WOUND Left 1/12/2020    Procedure: INCISION AND DRAINAGE (I&D) EXTREMITY AND REMOVAL OF SESMOID BONE;  Surgeon: Peyton Garcia DPM;  [facility];  Service: Podiatry    IR OTHER  1/21/2022    IR PICC REPOSITION  1/14/2020    KNEE ARTHROSCOPY Left     KNEE ARTHROSCOPY Right     KNEE SURGERY      AZ AMPUTATION TOE,I-P JT Bilateral 2017    Procedure: PARTIAL AMPUTATION RIGHT AND LEFT HALLUX ;  Surgeon: Eugenio Mendiola DPM;  [facility];  Service: Podiatry    SC AMPUTATION TOE,MT-P JT Left 2017    Procedure: 2ND TOE AMPUTATION;  Surgeon: Eugenio Mendiola DPM;  [facility];  Service: Podiatry    SC INSJ TUNNELED CTR VAD W/SUBQ PORT AGE 5 YR/> N/A 2022    Procedure: INSERTION VENOUS PORT ( PORT-A-CATH) IR;  Surgeon: Bobbi Armijo DO;  [facility];  Service: Interventional Radiology    RESECTION LOW ANTERIOR LAPAROSCOPIC N/A 10/21/2021    Procedure: RESECTION LOW ANTERIOR LAPAROSCOPIC;  Surgeon: Tasha Guzman MD;  [facility];  Service: Colorectal    SHOULDER ARTHROSCOPY Left     with screws,RTC    SHOULDER SURGERY      TOE AMPUTATION Left 2020    Procedure: [place];  Surgeon: Chuck Bee DPM;  [facility];  Service: Podiatry    UPPER GASTROINTESTINAL ENDOSCOPY  2020    Intestinal metaplasia prepyloric    VASECTOMY        Family History   Problem Relation Age of Onset    Heart disease Father         passed away   Kiko Stewart Hypertension Father     Pulmonary embolism Father     Hypertension Mother         assed away     Social History     Tobacco Use    Smoking status: Former Smoker     Packs/day: 0 50     Years: 20 00     Pack years: 10 00     Types: Cigarettes     Start date:      Quit date:      Years since quittin 1    Smokeless tobacco: Never Used    Tobacco comment: quit 10 years ago   Substance Use Topics    Alcohol use: Never     Alcohol/week: 0 0 standard drinks     Allergies   Allergen Reactions    Invokana [Canagliflozin] Other (See Comments)     Caused circulation problems    Lamisil [Terbinafine] Blisters     Wife states " His skin peeled from head to toe"    Other Swelling     Pomegranate - facial swelling, no swelling of tongue, esophagus  Adhesive tape        Latex Rash         Current Outpatient Medications:     allopurinol (ZYLOPRIM) 300 mg tablet, Take 1 tablet (300 mg total) by mouth every morning, Disp: 90 tablet, Rfl: 0    amLODIPine (NORVASC) 10 mg tablet, Take 1 tablet (10 mg total) by mouth daily, Disp: 90 tablet, Rfl: 3    apixaban (ELIQUIS) 5 mg, Take 1 tablet (5 mg total) by mouth every 12 (twelve) hours, Disp: 180 tablet, Rfl: 3    Dupilumab (Dupixent) 300 MG/2ML SOPN, Inject 300 mg under the skin Once every 2 weeks, Disp: , Rfl:     famotidine (PEPCID) 40 MG tablet, TAKE 1 TABLET BY MOUTH EVERY DAY, Disp: 90 tablet, Rfl: 3    Insulin Degludec-Liraglutide (Xultophy) 100 units-3 6 mg/mL injection pen, Inject 19 units daily  , Disp: 18 mL, Rfl: 1    Insulin Pen Needle (BD Pen Needle Zenaida U/F) 32G X 4 MM MISC, Use 1 daily, Disp: 100 each, Rfl: 2    metolazone (ZAROXOLYN) 5 mg tablet, Take 1 tablet (5 mg total) by mouth as needed (1-2 x /week for worsening swelling), Disp: 12 tablet, Rfl: 3    metoprolol tartrate (LOPRESSOR) 50 mg tablet, Take 1 tablet (50 mg total) by mouth every 12 (twelve) hours, Disp: 180 tablet, Rfl: 3    Multiple Vitamin (MULTIVITAMIN) tablet, Take 1 tablet by mouth daily IN AM, Disp: , Rfl:     omega-3-acid ethyl esters (LOVAZA) 1 g capsule, Take 2 capsules (2 g total) by mouth 2 (two) times a day Mail print prescription to pt, Disp: 360 capsule, Rfl: 3    ondansetron (ZOFRAN) 8 mg tablet, Take 1 tablet (8 mg total) by mouth every 8 (eight) hours as needed for nausea or vomiting, Disp: 20 tablet, Rfl: 3    OneTouch Ultra test strip, USE TO TEST BLOOD SUGAR 3 TIMES A DAY, Disp: 100 each, Rfl: 3    simvastatin (ZOCOR) 20 mg tablet, Take 1 tablet (20 mg total) by mouth daily at bedtime, Disp: 90 tablet, Rfl: 3    sodium bicarbonate 650 mg tablet, Take 1 tablet (650 mg total) by mouth 2 (two) times daily after meals, Disp: 180 tablet, Rfl: 0    tamsulosin (FLOMAX) 0 4 mg, TAKE 1 CAPSULE BY MOUTH EVERY DAY WITH DINNER, Disp: 90 capsule, Rfl: 3    torsemide (DEMADEX) 20 mg tablet, Take 2 tablets (40 mg total) by mouth 2 (two) times a day 40mg am and 20mg pm, Disp: , Rfl: 3    Review of Systems   Constitutional: Negative for activity change, appetite change, fatigue and unexpected weight change  HENT: Negative for dental problem, sore throat, trouble swallowing and voice change  Eyes: Negative for visual disturbance  Respiratory: Negative for cough, chest tightness and shortness of breath  Cardiovascular: Negative for chest pain, palpitations and leg swelling  Gastrointestinal: Negative for constipation, diarrhea, nausea and vomiting  Endocrine: Negative for polydipsia, polyphagia and polyuria  Genitourinary: Negative for frequency  Musculoskeletal: Positive for arthralgias  Negative for back pain, gait problem and myalgias  Skin: Negative for wound  Allergic/Immunologic: Positive for environmental allergies, food allergies and immunocompromised state  Neurological: Positive for numbness  Negative for dizziness, weakness, light-headedness and headaches  Hematological: Does not bruise/bleed easily  Psychiatric/Behavioral: Negative for decreased concentration, dysphoric mood and sleep disturbance  The patient is not nervous/anxious  Physical Exam:  Body mass index is 27 84 kg/m²  /68   Pulse 76   Temp 98 7 °F (37 1 °C)   Ht 5' 10" (1 778 m)   Wt 88 kg (194 lb)   BMI 27 84 kg/m²    Wt Readings from Last 3 Encounters:   02/24/22 88 kg (194 lb)   02/23/22 89 8 kg (198 lb)   02/03/22 96 6 kg (213 lb)       Physical Exam  Vitals reviewed  Constitutional:       General: He is not in acute distress  Appearance: He is well-developed  He is not ill-appearing  HENT:      Head: Normocephalic and atraumatic  Comments: Mask in place  Eyes:      Pupils: Pupils are equal, round, and reactive to light  Neck:      Thyroid: No thyromegaly  Cardiovascular:      Rate and Rhythm: Normal rate and regular rhythm  Pulses: Normal pulses        Heart sounds: Murmur (systolic) heard  Pulmonary:      Effort: Pulmonary effort is normal       Breath sounds: Normal breath sounds  Abdominal:      General: Bowel sounds are normal       Palpations: Abdomen is soft  Musculoskeletal:      Cervical back: Normal range of motion and neck supple  Right lower leg: No edema  Left lower leg: No edema  Lymphadenopathy:      Cervical: No cervical adenopathy  Skin:     General: Skin is warm and dry  Neurological:      Mental Status: He is alert and oriented to person, place, and time  Gait: Gait normal    Psychiatric:         Mood and Affect: Mood normal          Behavior: Behavior normal            Labs:   Lab Results   Component Value Date    HGBA1C 6 0 (H) 02/18/2022    HGBA1C 5 7 (H) 12/14/2021    HGBA1C 6 0 (H) 11/29/2021     Lab Results   Component Value Date    CREATININE 3 74 (H) 02/18/2022    CREATININE 3 46 (H) 02/11/2022    CREATININE 3 51 (H) 01/28/2022    BUN 54 (H) 02/18/2022     (L) 04/10/2017    K 4 4 02/18/2022     02/18/2022    CO2 25 02/18/2022     eGFR   Date Value Ref Range Status   02/18/2022 15 ml/min/1 73sq m Final     Lab Results   Component Value Date    CHOL 161 12/12/2015    HDL 32 (L) 07/31/2021    TRIG 122 07/31/2021     Lab Results   Component Value Date    ALT 22 02/18/2022    AST 19 02/18/2022    ALKPHOS 114 02/18/2022    BILITOT 0 7 04/10/2017     Lab Results   Component Value Date    FFZ1KWZPKZFE 0 504 01/19/2020    PFP6BJBCRILS 3 430 09/19/2019    NON1XBQLZGRV 4 170 (H) 06/12/2019     Lab Results   Component Value Date    FREET4 0 88 09/19/2019       Impression & Plan:    Problem List Items Addressed This Visit        Endocrine    Type 2 diabetes mellitus with chronic kidney disease, with long-term current use of insulin ([address] ) - Primary     Patient's diabetes is well controlled  Continue current regimen  Continue without healthy diet  Continue with physical activity as tolerated    Patient will be receiving regular blood work  Will also check a hemoglobin A1c prior to next appointment in 6 months  Lab Results   Component Value Date    HGBA1C 6 0 (H) 02/18/2022            Relevant Orders    HEMOGLOBIN A1C W/ EAG ESTIMATION Lab Collect       Other    S/P amputation of lesser toe, right ([address] )          Orders Placed This Encounter   Procedures    HEMOGLOBIN A1C W/ EAG ESTIMATION Lab Collect     Standing Status:   Future     Standing Expiration Date:   2/24/2023       There are no Patient Instructions on file for this visit  Discussed with the patient and all questioned fully answered  He will call me if any problems arise  Follow-up appointment in 6 months       Counseled patient on diagnostic results, prognosis, risk and benefit of treatment options, instruction for management, importance of treatment compliance, Risk  factor reduction and impressions    PAT Duarte

## 2022-02-24 ENCOUNTER — APPOINTMENT (OUTPATIENT)
Dept: LAB | Facility: CLINIC | Age: 67
End: 2022-02-24
Payer: MEDICARE

## 2022-02-24 ENCOUNTER — TELEPHONE (OUTPATIENT)
Dept: HEMATOLOGY ONCOLOGY | Facility: CLINIC | Age: 67
End: 2022-02-24

## 2022-02-24 ENCOUNTER — OFFICE VISIT (OUTPATIENT)
Dept: ENDOCRINOLOGY | Facility: CLINIC | Age: 67
End: 2022-02-24
Payer: MEDICARE

## 2022-02-24 VITALS
HEIGHT: 70 IN | SYSTOLIC BLOOD PRESSURE: 128 MMHG | WEIGHT: 194 LBS | HEART RATE: 76 BPM | TEMPERATURE: 98.7 F | BODY MASS INDEX: 27.77 KG/M2 | DIASTOLIC BLOOD PRESSURE: 68 MMHG

## 2022-02-24 DIAGNOSIS — E11.22 TYPE 2 DIABETES MELLITUS WITH STAGE 4 CHRONIC KIDNEY DISEASE, WITH LONG-TERM CURRENT USE OF INSULIN (HCC): Primary | ICD-10-CM

## 2022-02-24 DIAGNOSIS — Z89.421 S/P AMPUTATION OF LESSER TOE, RIGHT (HCC): ICD-10-CM

## 2022-02-24 DIAGNOSIS — Z79.4 TYPE 2 DIABETES MELLITUS WITH STAGE 4 CHRONIC KIDNEY DISEASE, WITH LONG-TERM CURRENT USE OF INSULIN (HCC): Primary | ICD-10-CM

## 2022-02-24 DIAGNOSIS — M86.9 OSTEOMYELITIS OF LEFT FOOT, UNSPECIFIED TYPE (HCC): ICD-10-CM

## 2022-02-24 DIAGNOSIS — N18.32 STAGE 3B CHRONIC KIDNEY DISEASE (HCC): ICD-10-CM

## 2022-02-24 DIAGNOSIS — N18.4 TYPE 2 DIABETES MELLITUS WITH STAGE 4 CHRONIC KIDNEY DISEASE, WITH LONG-TERM CURRENT USE OF INSULIN (HCC): Primary | ICD-10-CM

## 2022-02-24 LAB
ANION GAP SERPL CALCULATED.3IONS-SCNC: 8 MMOL/L (ref 4–13)
BUN SERPL-MCNC: 67 MG/DL (ref 5–25)
CALCIUM SERPL-MCNC: 10.3 MG/DL (ref 8.3–10.1)
CHLORIDE SERPL-SCNC: 103 MMOL/L (ref 100–108)
CO2 SERPL-SCNC: 25 MMOL/L (ref 21–32)
CREAT SERPL-MCNC: 3.89 MG/DL (ref 0.6–1.3)
ERYTHROCYTE [DISTWIDTH] IN BLOOD BY AUTOMATED COUNT: 16.6 % (ref 11.6–15.1)
GFR SERPL CREATININE-BSD FRML MDRD: 14 ML/MIN/1.73SQ M
GLUCOSE P FAST SERPL-MCNC: 116 MG/DL (ref 65–99)
HCT VFR BLD AUTO: 37.1 % (ref 36.5–49.3)
HGB BLD-MCNC: 12.3 G/DL (ref 12–17)
MCH RBC QN AUTO: 30.6 PG (ref 26.8–34.3)
MCHC RBC AUTO-ENTMCNC: 33.2 G/DL (ref 31.4–37.4)
MCV RBC AUTO: 92 FL (ref 82–98)
PHOSPHATE SERPL-MCNC: 5 MG/DL (ref 2.3–4.1)
PLATELET # BLD AUTO: 248 THOUSANDS/UL (ref 149–390)
PMV BLD AUTO: 10.4 FL (ref 8.9–12.7)
POTASSIUM SERPL-SCNC: 3.9 MMOL/L (ref 3.5–5.3)
RBC # BLD AUTO: 4.02 MILLION/UL (ref 3.88–5.62)
SODIUM SERPL-SCNC: 136 MMOL/L (ref 136–145)
URATE SERPL-MCNC: 4.2 MG/DL (ref 4.2–8)
WBC # BLD AUTO: 13.11 THOUSAND/UL (ref 4.31–10.16)

## 2022-02-24 PROCEDURE — 84100 ASSAY OF PHOSPHORUS: CPT

## 2022-02-24 PROCEDURE — 84550 ASSAY OF BLOOD/URIC ACID: CPT

## 2022-02-24 PROCEDURE — 36415 COLL VENOUS BLD VENIPUNCTURE: CPT

## 2022-02-24 PROCEDURE — 85027 COMPLETE CBC AUTOMATED: CPT

## 2022-02-24 PROCEDURE — 80048 BASIC METABOLIC PNL TOTAL CA: CPT

## 2022-02-24 PROCEDURE — 99214 OFFICE O/P EST MOD 30 MIN: CPT | Performed by: NURSE PRACTITIONER

## 2022-02-24 NOTE — ASSESSMENT & PLAN NOTE
Patient's diabetes is well controlled  Continue current regimen  Continue without healthy diet  Continue with physical activity as tolerated  Patient will be receiving regular blood work  Will also check a hemoglobin A1c prior to next appointment in 6 months    Lab Results   Component Value Date    HGBA1C 6 0 (H) 02/18/2022

## 2022-02-24 NOTE — PROGRESS NOTES
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  3 of 16 lymph nodes showed metastatic disease  There was delay in starting patient on adjuvant chemotherapy because of his other medical conditions  On 01/18/2022 he had 1st dose of modified FOLFOX  Patient required hydration and intravenous nausea medication on 01/20/2022 and again on 1/21/22 and he was hospitalized because of nausea, vomiting and worsening of renal functions and also he was getting confused  He improved and was sent home  Question is what to give him next  Patient had PET-CT scan on 01/04/2022 and there was question of abdominal metastatic disease verses fat necrosis and question is are we dealing with stage 3 or stage IV disease  Does he need adjuvant systemic therapy or for metastatic disease  Presently patient is feeling better and has some tiredness but no nausea and vomiting  Bowels are moving  No abdominal pain  No cardiac and pulmonary symptoms  No  symptoms  No headache and seizures  No bone pains  No fevers, chills or bleeding  No skin rash  Appetite is improving  History of chronic ulcer of left foot and patient follows with a podiatrist     Has some tiredness and weakness  Has lost some weight        Current Outpatient Medications:     allopurinol (ZYLOPRIM) 300 mg tablet, Take 1 tablet (300 mg total) by mouth every morning, Disp: 90 tablet, Rfl: 0    amLODIPine (NORVASC) 10 mg tablet, Take 1 tablet (10 mg total) by mouth daily, Disp: 90 tablet, Rfl: 3    apixaban (ELIQUIS) 5 mg, Take 1 tablet (5 mg total) by mouth every 12 (twelve) hours, Disp: 180 tablet, Rfl: 3    Dupilumab (Dupixent) 300 MG/2ML SOPN, Inject 300 mg under the skin Once every 2 weeks, Disp: , Rfl:     famotidine (PEPCID) 40 MG tablet, TAKE 1 TABLET BY MOUTH EVERY DAY, Disp: 90 tablet, Rfl: 3    Insulin Degludec-Liraglutide (Xultophy) 100 units-3 6 mg/mL injection pen, Inject 19 units daily  , Disp: 18 mL, Rfl: 1    Insulin Pen Needle (BD Pen Needle Zenaida U/F) 32G X 4 MM MISC, Use 1 daily, Disp: 100 each, Rfl: 2    metolazone (ZAROXOLYN) 5 mg tablet, Take 1 tablet (5 mg total) by mouth as needed (1-2 x /week for worsening swelling), Disp: 12 tablet, Rfl: 3    metoprolol tartrate (LOPRESSOR) 50 mg tablet, Take 1 tablet (50 mg total) by mouth every 12 (twelve) hours, Disp: 180 tablet, Rfl: 3    Multiple Vitamin (MULTIVITAMIN) tablet, Take 1 tablet by mouth daily IN AM, Disp: , Rfl:     omega-3-acid ethyl esters (LOVAZA) 1 g capsule, Take 2 capsules (2 g total) by mouth 2 (two) times a day Mail print prescription to pt, Disp: 360 capsule, Rfl: 3    ondansetron (ZOFRAN) 8 mg tablet, Take 1 tablet (8 mg total) by mouth every 8 (eight) hours as needed for nausea or vomiting, Disp: 20 tablet, Rfl: 3    OneTouch Ultra test strip, USE TO TEST BLOOD SUGAR 3 TIMES A DAY, Disp: 100 each, Rfl: 3    simvastatin (ZOCOR) 20 mg tablet, Take 1 tablet (20 mg total) by mouth daily at bedtime, Disp: 90 tablet, Rfl: 3    sodium bicarbonate 650 mg tablet, Take 1 tablet (650 mg total) by mouth 2 (two) times daily after meals, Disp: 180 tablet, Rfl: 0    tamsulosin (FLOMAX) 0 4 mg, TAKE 1 CAPSULE BY MOUTH EVERY DAY WITH DINNER, Disp: 90 capsule, Rfl: 3    torsemide (DEMADEX) 20 mg tablet, Take 2 tablets (40 mg total) by mouth 2 (two) times a day 40mg am and 20mg pm, Disp: , Rfl: 3    Allergies   Allergen Reactions    Invokana [Canagliflozin] Other (See Comments)     Caused circulation problems    Lamisil [Terbinafine] Blisters     Wife states " His skin peeled from head to toe"    Other Swelling     Pomegranate - facial swelling, no swelling of tongue, esophagus  Adhesive tape        Latex Rash       Oncology History   Colon cancer ([address] )   10/23/2021 Initial Diagnosis Colon cancer ([facility] [address] )     1/18/2022 -  Chemotherapy    fluorouracil (ADRUCIL), 400 mg/m2 = 830 mg, Intravenous, Once, 1 of 12 cycles  Administration: 830 mg (1/18/2022)  leucovorin calcium IVPB, 828 mg, Intravenous, Once, 1 of 12 cycles  Administration: 800 mg (1/18/2022)  fluorouracil (ADRUCIL) ambulatory infusion Soln, 1,200 mg/m2/day = 4,970 mg, Intravenous, Over 46 hours, 1 of 12 cycles         ROS:  02/23/22 Reviewed 12 systems: See symptoms in HPI  Presently no other neurological, cardiac, pulmonary, GI and  symptoms other than mentioned in HPI  Other symptoms are in HPI  No fever, chills, bleeding, bone pains, skin rash,  night sweats, arthritic symptoms,  numbness,  claudication and gait problem  No frequent infections  Not unusually sensitive to heat or cold  No swelling of the ankles  No swollen glands  Patient is anxious  /70 (BP Location: Left arm, Patient Position: Sitting, Cuff Size: Adult)   Pulse 74   Temp 97 8 °F (36 6 °C) (Temporal)   Resp 16   Ht 5' 10" (1 778 m)   Wt 89 8 kg (198 lb)   SpO2 97%   BMI 28 41 kg/m²     Physical Exam:  Alert, oriented, not in distress, no icterus, no oral thrush, no palpable neck mass, clear lung fields, regular heart rate, systolic murmur, abdomen  soft and non tender, no palpable abdominal mass, no ascites, no edema of ankles, no calf tenderness, no focal neurological deficit, no skin rash, no palpable lymphadenopathy in the neck and axillary areas, no clubbing  Patient is anxious  Performance status 2  Special shoe for left foot  IMAGING:  No orders to display       LABS:  Results for orders placed or performed in visit on 02/18/22   Iron Saturation %   Result Value Ref Range    Iron Saturation 25 20 - 50 %    TIBC 321 250 - 450 ug/dL    Iron 80 65 - 175 ug/dL   Ferritin   Result Value Ref Range    Ferritin 665 (H) 8 - 388 ng/mL   Hemoglobin A1C   Result Value Ref Range    Hemoglobin A1C 6 0 (H) Normal 3 8-5 6%;  PreDiabetic 5 7-6 4%; Diabetic >=6 5%; Glycemic control for adults with diabetes <7 0% %     mg/dl   CBC and differential   Result Value Ref Range    WBC 12 74 (H) 4 31 - 10 16 Thousand/uL    RBC 4 01 3 88 - 5 62 Million/uL    Hemoglobin 12 3 12 0 - 17 0 g/dL    Hematocrit 37 3 36 5 - 49 3 %    MCV 93 82 - 98 fL    MCH 30 7 26 8 - 34 3 pg    MCHC 33 0 31 4 - 37 4 g/dL    RDW 16 6 (H) 11 6 - 15 1 %    MPV 10 4 8 9 - 12 7 fL    Platelets 999 668 - 802 Thousands/uL    nRBC 0 /100 WBCs    Neutrophils Relative 77 (H) 43 - 75 %    Immat GRANS % 1 0 - 2 %    Lymphocytes Relative 10 (L) 14 - 44 %    Monocytes Relative 7 4 - 12 %    Eosinophils Relative 4 0 - 6 %    Basophils Relative 1 0 - 1 %    Neutrophils Absolute 9 92 (H) 1 85 - 7 62 Thousands/µL    Immature Grans Absolute 0 10 0 00 - 0 20 Thousand/uL    Lymphocytes Absolute 1 22 0 60 - 4 47 Thousands/µL    Monocytes Absolute 0 94 0 17 - 1 22 Thousand/µL    Eosinophils Absolute 0 48 0 00 - 0 61 Thousand/µL    Basophils Absolute 0 08 0 00 - 0 10 Thousands/µL   Magnesium   Result Value Ref Range    Magnesium 2 1 1 6 - 2 6 mg/dL   Phosphorus   Result Value Ref Range    Phosphorus 4 2 (H) 2 3 - 4 1 mg/dL   Comprehensive metabolic panel   Result Value Ref Range    Sodium 135 (L) 136 - 145 mmol/L    Potassium 4 4 3 5 - 5 3 mmol/L    Chloride 104 100 - 108 mmol/L    CO2 25 21 - 32 mmol/L    ANION GAP 6 4 - 13 mmol/L    BUN 54 (H) 5 - 25 mg/dL    Creatinine 3 74 (H) 0 60 - 1 30 mg/dL    Glucose, Fasting 125 (H) 65 - 99 mg/dL    Calcium 10 1 8 3 - 10 1 mg/dL    Corrected Calcium 10 7 (H) 8 3 - 10 1 mg/dL    AST 19 5 - 45 U/L    ALT 22 12 - 78 U/L    Alkaline Phosphatase 114 46 - 116 U/L    Total Protein 8 2 6 4 - 8 2 g/dL    Albumin 3 3 (L) 3 5 - 5 0 g/dL    Total Bilirubin 1 17 (H) 0 20 - 1 00 mg/dL    eGFR 15 ml/min/1 73sq m     *Note: Due to a large number of results and/or encounters for the requested time period, some results have not been displayed   A complete set of results can be found in Results Review  Labs, Imaging, & Other studies:   All pertinent labs and imaging studies were personally reviewed    Lab Results   Component Value Date     (L) 04/10/2017    K 4 4 02/18/2022     02/18/2022    CO2 25 02/18/2022    BUN 54 (H) 02/18/2022    CREATININE 3 74 (H) 02/18/2022    GLUCOSE 142 (H) 11/01/2018    GLUF 125 (H) 02/18/2022    CALCIUM 10 1 02/18/2022    CORRECTEDCA 10 7 (H) 02/18/2022    AST 19 02/18/2022    ALT 22 02/18/2022    ALKPHOS 114 02/18/2022    PROT 6 9 04/10/2017    BILITOT 0 7 04/10/2017    EGFR 15 02/18/2022     Lab Results   Component Value Date    WBC 12 74 (H) 02/18/2022    HGB 12 3 02/18/2022    HCT 37 3 02/18/2022    MCV 93 02/18/2022     02/18/2022       Reviewed and discussed with patient  Assessment and plan: See diagnoses, orders and instructions below  Patient follows with Dr Radha Ernandez  Patient had left anterior resection on 10/21/2021 for 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 area  3 of 16 lymph nodes showed metastatic disease  There was delay in starting patient on adjuvant chemotherapy because of his other medical conditions  On 01/18/2022 he had 1st dose of modified FOLFOX  Patient required hydration and intravenous nausea medication on 01/20/2022 and again on 1/21/22 and he was hospitalized because of nausea, vomiting and worsening of renal functions and also he was getting confused  He improved and was sent home  Question is what to give him next  Patient had PET-CT scan on 01/04/2022 and there was question of abdominal metastatic disease verses fat necrosis and question is are we dealing with stage 3 or stage IV disease  Does he need adjuvant systemic therapy or for metastatic disease  Presently patient is feeling better and has some tiredness but no nausea and vomiting  Bowels are moving  No abdominal pain  No cardiac and pulmonary symptoms  No  symptoms  No headache and seizures  No bone pains  No fevers, chills or bleeding  No skin rash  Appetite is improving  History of chronic ulcer of left foot and patient follows with a podiatrist     Has some tiredness and weakness  Has lost some weight     Physical examination and test results are as recorded and discussed  He could have stage III or stage IV disease  If abdominal nodules improved or gotten worse that would favor metastatic disease but if remains stable then we will not know  He is not a candidate for oxaliplatin because of renal insufficiency  Also he is not a candidate for Xeloda because of renal insufficiency  He had problem tolerating FOLFOX  He could be tried on weekly 5 FU and Leucovorin, 5 weeks on and 1 week off or as tolerated and that will be without 5 FU infusion pump or he could be tried on 5 FU infusion at a dose of 800 mg per m2 and not 1200 mg per m2 as in FOLFOX but without bolus 5 FU  If tolerated 5 FU infusion dose could be increased at subsequent cycles  Also to check ammonia level on day 2 of the treatment because patient got confused couple days after 1st cycle  They understand that there is no guarantee that patient will not get sick again with chemo     They also mentioned about quality of life and not having chemotherapy  If stage IV one could use irinotecan with 5 FU but not in the adjuvant setting  I discussed all this with patient and his wife in detail  Questions answered  Patient will make an appointment with Dr Balta Jones as soon as possible, discuss and decide  All discussed in detail  Questions answered  Diet and activities per patient's primary physician and nephrologist     Patient appears to be capable of self-care  He will continue follow with primary physician and other consultants  Discussed precautions against coronavirus  1  Malignant neoplasm of sigmoid colon ([facility] [address] )      2   Type 2 diabetes mellitus with stage 4 chronic kidney disease, with long-term current use of insulin ([facility] )      3  Type 2 diabetes mellitus with diabetic neuropathy, without long-term current use of insulin ([facility] )      4  Acute on chronic diastolic congestive heart failure (HCC)      5  Stage 4 chronic kidney disease ([facility] )      6  Amputation of left great toe ([facility] )      7  Systolic murmur      8  Chemotherapy-induced nausea             Patient will continue to follow with his primary physician and other consultants  Patient  voiced understanding and agrees     This note has been generated by voice recognition softener  Therefore there maybe spelling, grammar, and/or syntax errors  Please contact if questions arise  Counseling / Coordination of Care       Provided counseling and support

## 2022-02-24 NOTE — TELEPHONE ENCOUNTER
Phoned pt and he asked that I speak to his wife as he can not hear well on phone  Norris Perez states Diya Ayers wants the [place] LCV/5FU at the reduced dose  I let wife know that I will need to discuss with Dr Latrell Parsons tomorrow and get back to her

## 2022-02-28 ENCOUNTER — TELEPHONE (OUTPATIENT)
Dept: NEPHROLOGY | Facility: CLINIC | Age: 67
End: 2022-02-28

## 2022-02-28 ENCOUNTER — TELEPHONE (OUTPATIENT)
Dept: UROLOGY | Facility: AMBULATORY SURGERY CENTER | Age: 67
End: 2022-02-28

## 2022-02-28 NOTE — TELEPHONE ENCOUNTER
Called patient to schedule follow up appointment  Patient stated him and Dr Sadiq Chahal discussed and he feels like he does not need a follow up  Patient will continue to follow up with Dr Leigh Sim

## 2022-02-28 NOTE — TELEPHONE ENCOUNTER
Appointment canceled for Julius Schmitz ([id number])  Visit Type: FOLLOW UP PG  Date        Time      Length    Provider                  Department  2/9/2022     9:00 AM  15 mins  Lana Fothergill, CRNP     [facility]     Reason for Cancellation: Hospitalized     Patient Comments: Good AM  We need to reschedule this appt  Pamela Younger had a severe reaction to his 1st chemo tx & he is hospitalized  He has alot on his plate, coming up, w/ appts  He had a bladder scan & bladder was emptying  Please contact Pamela Aren in a month to reschedule   thanks

## 2022-03-01 ENCOUNTER — TELEPHONE (OUTPATIENT)
Dept: NEPHROLOGY | Facility: HOSPITAL | Age: 67
End: 2022-03-01

## 2022-03-01 NOTE — TELEPHONE ENCOUNTER
----- Message from Jane Caldwell DO sent at 3/1/2022 12:39 PM EST -----  Creatinine is trending up but if his weight is stable would not change his medications at this time  Thanks

## 2022-03-01 NOTE — TELEPHONE ENCOUNTER
Called patient and stated the following information:    Creatinine is trending up but if his weight is stable would not change his medications at this time  Patient verbally understood and had no further questions

## 2022-03-02 ENCOUNTER — IN-CLINIC DEVICE VISIT (OUTPATIENT)
Dept: CARDIOLOGY CLINIC | Facility: CLINIC | Age: 67
End: 2022-03-02
Payer: MEDICARE

## 2022-03-02 DIAGNOSIS — Z95.0 PACEMAKER: Primary | ICD-10-CM

## 2022-03-02 PROCEDURE — 93280 PM DEVICE PROGR EVAL DUAL: CPT | Performed by: INTERNAL MEDICINE

## 2022-03-02 NOTE — PROGRESS NOTES
Results for orders placed or performed in visit on 03/02/22   Cardiac EP device report    Narrative    MDT-DUAL CHAMBER PPM (AAIR-DDDR MODE)/ACTIVE SYSTEM IS MRI CONDITIONAL  DEVICE INTERROGATED IN THE [employer] OFFICE/YEARLY  BATTERY ADEQUATE (5-8 7 YRS)  AP 38%;  100% (SSS/AAIR-DDDR 60)  ALL LEAD PARAMETERS WITHIN NORMAL LIMITS  AF 36% & IN PROGRESS  PT  TAKES ELIQUIS & METOPROLOL TART  NO VHR EPISODES  NO PROGRAMMING CHANGES MADE TO DEVICE PARAMETERS  NORMAL DEVICE FUNCTION   PL

## 2022-03-03 ENCOUNTER — TELEPHONE (OUTPATIENT)
Dept: HEMATOLOGY ONCOLOGY | Facility: CLINIC | Age: 67
End: 2022-03-03

## 2022-03-03 ENCOUNTER — TELEMEDICINE (OUTPATIENT)
Dept: HEMATOLOGY ONCOLOGY | Facility: CLINIC | Age: 67
End: 2022-03-03
Payer: MEDICARE

## 2022-03-03 DIAGNOSIS — T45.1X5A CHEMOTHERAPY-INDUCED NEUTROPENIA (HCC): Primary | ICD-10-CM

## 2022-03-03 DIAGNOSIS — C18.7 MALIGNANT NEOPLASM OF SIGMOID COLON (HCC): Primary | ICD-10-CM

## 2022-03-03 DIAGNOSIS — D70.1 CHEMOTHERAPY-INDUCED NEUTROPENIA (HCC): Primary | ICD-10-CM

## 2022-03-03 DIAGNOSIS — C18.7 MALIGNANT NEOPLASM OF SIGMOID COLON (HCC): ICD-10-CM

## 2022-03-03 PROCEDURE — 99442 PR PHYS/QHP TELEPHONE EVALUATION 11-20 MIN: CPT | Performed by: INTERNAL MEDICINE

## 2022-03-03 NOTE — PROGRESS NOTES
Virtual Brief Visit    Patient is located in the following state in which I hold an active license [place]      Assessment/Plan:  I had a lengthy discussion with the patient and his wife regarding the best approach for his metastatic colon cancer  The patient was seen by Dr Tariq Georges for 2nd opinion regarding the best regimen and dosage to avoid renal failure or another hospitalization  The patient of fortunately did not tolerate the 1st cycle of 5 fluorouracil and leucovorin treatment which resulted in hospitalization and dialysis x2  Decision was then made after her on further discussion to pursue the 5 fluorouracil at the dose of 250 mg per m2 IV push on a weekly basis 5 weeks on and 1 week off along with the same dose of the leucovorin  We could increase the dose of the 5 fluorouracil and Leucovorin according to the patient's overall condition and tolerance  Problem List Items Addressed This Visit     None          Recent Visits  No visits were found meeting these conditions  Showing recent visits within past 7 days and meeting all other requirements  Today's Visits  Date Type Provider Dept   03/03/22 Telephone Trino Wylie RN [facility]   Showing today's visits and meeting all other requirements  Future Appointments  No visits were found meeting these conditions    Showing future appointments within next 150 days and meeting all other requirements         I spent 20 minutes directly with the patient during this visit

## 2022-03-04 ENCOUNTER — TELEPHONE (OUTPATIENT)
Dept: HEMATOLOGY ONCOLOGY | Facility: CLINIC | Age: 67
End: 2022-03-04

## 2022-03-04 DIAGNOSIS — D70.1 CHEMOTHERAPY-INDUCED NEUTROPENIA (HCC): Primary | ICD-10-CM

## 2022-03-04 DIAGNOSIS — C18.7 MALIGNANT NEOPLASM OF SIGMOID COLON (HCC): ICD-10-CM

## 2022-03-04 DIAGNOSIS — T45.1X5A CHEMOTHERAPY-INDUCED NEUTROPENIA (HCC): Primary | ICD-10-CM

## 2022-03-04 DIAGNOSIS — C18.7 MALIGNANT NEOPLASM OF SIGMOID COLON (HCC): Primary | ICD-10-CM

## 2022-03-04 RX ORDER — DEXTROSE MONOHYDRATE 50 MG/ML
20 INJECTION, SOLUTION INTRAVENOUS ONCE
Status: CANCELLED | OUTPATIENT
Start: 2022-03-14

## 2022-03-04 RX ORDER — DEXTROSE MONOHYDRATE 50 MG/ML
20 INJECTION, SOLUTION INTRAVENOUS ONCE
Status: CANCELLED | OUTPATIENT
Start: 2022-03-07

## 2022-03-04 RX ORDER — SODIUM CHLORIDE 9 MG/ML
20 INJECTION, SOLUTION INTRAVENOUS ONCE AS NEEDED
Status: CANCELLED | OUTPATIENT
Start: 2022-03-07

## 2022-03-04 RX ORDER — FLUOROURACIL 50 MG/ML
500 INJECTION, SOLUTION INTRAVENOUS ONCE
Status: CANCELLED | OUTPATIENT
Start: 2022-04-04

## 2022-03-04 RX ORDER — SODIUM CHLORIDE 9 MG/ML
20 INJECTION, SOLUTION INTRAVENOUS ONCE AS NEEDED
Status: CANCELLED | OUTPATIENT
Start: 2022-03-28

## 2022-03-04 RX ORDER — SODIUM CHLORIDE 9 MG/ML
20 INJECTION, SOLUTION INTRAVENOUS ONCE AS NEEDED
Status: CANCELLED | OUTPATIENT
Start: 2022-04-04

## 2022-03-04 RX ORDER — FLUOROURACIL 50 MG/ML
500 INJECTION, SOLUTION INTRAVENOUS ONCE
Status: CANCELLED | OUTPATIENT
Start: 2022-03-14

## 2022-03-04 RX ORDER — FLUOROURACIL 50 MG/ML
500 INJECTION, SOLUTION INTRAVENOUS ONCE
Status: CANCELLED | OUTPATIENT
Start: 2022-03-07

## 2022-03-04 RX ORDER — SODIUM CHLORIDE 9 MG/ML
20 INJECTION, SOLUTION INTRAVENOUS ONCE AS NEEDED
Status: CANCELLED | OUTPATIENT
Start: 2022-03-21

## 2022-03-04 RX ORDER — FLUOROURACIL 50 MG/ML
500 INJECTION, SOLUTION INTRAVENOUS ONCE
Status: CANCELLED | OUTPATIENT
Start: 2022-03-28

## 2022-03-04 RX ORDER — DEXTROSE MONOHYDRATE 50 MG/ML
20 INJECTION, SOLUTION INTRAVENOUS ONCE
Status: CANCELLED | OUTPATIENT
Start: 2022-03-28

## 2022-03-04 RX ORDER — FLUOROURACIL 50 MG/ML
500 INJECTION, SOLUTION INTRAVENOUS ONCE
Status: CANCELLED | OUTPATIENT
Start: 2022-03-21

## 2022-03-04 RX ORDER — DEXTROSE MONOHYDRATE 50 MG/ML
20 INJECTION, SOLUTION INTRAVENOUS ONCE
Status: CANCELLED | OUTPATIENT
Start: 2022-03-21

## 2022-03-04 RX ORDER — DEXTROSE MONOHYDRATE 50 MG/ML
20 INJECTION, SOLUTION INTRAVENOUS ONCE
Status: CANCELLED | OUTPATIENT
Start: 2022-04-04

## 2022-03-04 RX ORDER — SODIUM CHLORIDE 9 MG/ML
20 INJECTION, SOLUTION INTRAVENOUS ONCE AS NEEDED
Status: CANCELLED | OUTPATIENT
Start: 2022-03-14

## 2022-03-04 NOTE — PROGRESS NOTES
TIME OUT:    Call received from Shalonda Britt RN  Treatment plan to begin Monday 3/7/22:  Weekly x5, 1 week off  Leucovorin 250 mg/m2 over 2 hours  Adrucil Push 250 mg/m2    Pharmacy made aware and Abiodun Bettencourt to make patient aware

## 2022-03-04 NOTE — TELEPHONE ENCOUNTER
Phoned pt's wife nora and left a message informing her of chemo date of 3/7/22 at 12:30  Asked that she have labs drawn on Sat, orders in Epic

## 2022-03-05 ENCOUNTER — APPOINTMENT (OUTPATIENT)
Dept: LAB | Facility: CLINIC | Age: 67
End: 2022-03-05
Payer: MEDICARE

## 2022-03-05 DIAGNOSIS — C18.7 MALIGNANT NEOPLASM OF SIGMOID COLON (HCC): ICD-10-CM

## 2022-03-05 LAB
ALBUMIN SERPL BCP-MCNC: 3.5 G/DL (ref 3.5–5)
ALP SERPL-CCNC: 100 U/L (ref 46–116)
ALT SERPL W P-5'-P-CCNC: 26 U/L (ref 12–78)
ANION GAP SERPL CALCULATED.3IONS-SCNC: 5 MMOL/L (ref 4–13)
AST SERPL W P-5'-P-CCNC: 26 U/L (ref 5–45)
BASOPHILS # BLD AUTO: 0.08 THOUSANDS/ΜL (ref 0–0.1)
BASOPHILS NFR BLD AUTO: 1 % (ref 0–1)
BILIRUB SERPL-MCNC: 1 MG/DL (ref 0.2–1)
BUN SERPL-MCNC: 59 MG/DL (ref 5–25)
CALCIUM SERPL-MCNC: 9.5 MG/DL (ref 8.3–10.1)
CHLORIDE SERPL-SCNC: 107 MMOL/L (ref 100–108)
CO2 SERPL-SCNC: 27 MMOL/L (ref 21–32)
CREAT SERPL-MCNC: 3.65 MG/DL (ref 0.6–1.3)
EOSINOPHIL # BLD AUTO: 0.68 THOUSAND/ΜL (ref 0–0.61)
EOSINOPHIL NFR BLD AUTO: 6 % (ref 0–6)
ERYTHROCYTE [DISTWIDTH] IN BLOOD BY AUTOMATED COUNT: 16.2 % (ref 11.6–15.1)
GFR SERPL CREATININE-BSD FRML MDRD: 16 ML/MIN/1.73SQ M
GLUCOSE P FAST SERPL-MCNC: 102 MG/DL (ref 65–99)
HCT VFR BLD AUTO: 35.8 % (ref 36.5–49.3)
HGB BLD-MCNC: 11.7 G/DL (ref 12–17)
IMM GRANULOCYTES # BLD AUTO: 0.04 THOUSAND/UL (ref 0–0.2)
IMM GRANULOCYTES NFR BLD AUTO: 0 % (ref 0–2)
LYMPHOCYTES # BLD AUTO: 1.04 THOUSANDS/ΜL (ref 0.6–4.47)
LYMPHOCYTES NFR BLD AUTO: 9 % (ref 14–44)
MAGNESIUM SERPL-MCNC: 2.3 MG/DL (ref 1.6–2.6)
MCH RBC QN AUTO: 30.6 PG (ref 26.8–34.3)
MCHC RBC AUTO-ENTMCNC: 32.7 G/DL (ref 31.4–37.4)
MCV RBC AUTO: 94 FL (ref 82–98)
MONOCYTES # BLD AUTO: 0.77 THOUSAND/ΜL (ref 0.17–1.22)
MONOCYTES NFR BLD AUTO: 7 % (ref 4–12)
NEUTROPHILS # BLD AUTO: 8.59 THOUSANDS/ΜL (ref 1.85–7.62)
NEUTS SEG NFR BLD AUTO: 77 % (ref 43–75)
NRBC BLD AUTO-RTO: 0 /100 WBCS
PLATELET # BLD AUTO: 235 THOUSANDS/UL (ref 149–390)
PMV BLD AUTO: 10.5 FL (ref 8.9–12.7)
POTASSIUM SERPL-SCNC: 4.2 MMOL/L (ref 3.5–5.3)
PROT SERPL-MCNC: 8 G/DL (ref 6.4–8.2)
RBC # BLD AUTO: 3.82 MILLION/UL (ref 3.88–5.62)
SODIUM SERPL-SCNC: 139 MMOL/L (ref 136–145)
WBC # BLD AUTO: 11.2 THOUSAND/UL (ref 4.31–10.16)

## 2022-03-05 PROCEDURE — 36415 COLL VENOUS BLD VENIPUNCTURE: CPT

## 2022-03-05 PROCEDURE — 85025 COMPLETE CBC W/AUTO DIFF WBC: CPT

## 2022-03-05 PROCEDURE — 80053 COMPREHEN METABOLIC PANEL: CPT

## 2022-03-05 PROCEDURE — 83735 ASSAY OF MAGNESIUM: CPT

## 2022-03-07 ENCOUNTER — HOSPITAL ENCOUNTER (OUTPATIENT)
Dept: INFUSION CENTER | Facility: HOSPITAL | Age: 67
Discharge: HOME | End: 2022-03-07
Attending: INTERNAL MEDICINE
Payer: MEDICARE

## 2022-03-07 VITALS
TEMPERATURE: 96 F | OXYGEN SATURATION: 93 % | HEIGHT: 70 IN | HEART RATE: 89 BPM | DIASTOLIC BLOOD PRESSURE: 69 MMHG | BODY MASS INDEX: 27.74 KG/M2 | WEIGHT: 193.78 LBS | SYSTOLIC BLOOD PRESSURE: 118 MMHG | RESPIRATION RATE: 18 BRPM

## 2022-03-07 DIAGNOSIS — D70.1 CHEMOTHERAPY-INDUCED NEUTROPENIA (HCC): ICD-10-CM

## 2022-03-07 DIAGNOSIS — C18.7 MALIGNANT NEOPLASM OF SIGMOID COLON (HCC): Primary | ICD-10-CM

## 2022-03-07 DIAGNOSIS — T45.1X5A CHEMOTHERAPY-INDUCED NEUTROPENIA (HCC): ICD-10-CM

## 2022-03-07 PROCEDURE — 96409 CHEMO IV PUSH SNGL DRUG: CPT

## 2022-03-07 PROCEDURE — 96366 THER/PROPH/DIAG IV INF ADDON: CPT

## 2022-03-07 PROCEDURE — 96367 TX/PROPH/DG ADDL SEQ IV INF: CPT

## 2022-03-07 RX ORDER — FLUOROURACIL 50 MG/ML
500 INJECTION, SOLUTION INTRAVENOUS ONCE
Status: COMPLETED | OUTPATIENT
Start: 2022-03-07 | End: 2022-03-07

## 2022-03-07 RX ORDER — DEXTROSE MONOHYDRATE 50 MG/ML
20 INJECTION, SOLUTION INTRAVENOUS ONCE
Status: DISCONTINUED | OUTPATIENT
Start: 2022-03-07 | End: 2022-03-11 | Stop reason: HOSPADM

## 2022-03-07 RX ORDER — SODIUM CHLORIDE 9 MG/ML
20 INJECTION, SOLUTION INTRAVENOUS ONCE AS NEEDED
Status: DISCONTINUED | OUTPATIENT
Start: 2022-03-07 | End: 2022-03-11 | Stop reason: HOSPADM

## 2022-03-07 RX ADMIN — FLUOROURACIL 500 MG: 50 INJECTION, SOLUTION INTRAVENOUS at 15:55

## 2022-03-07 RX ADMIN — DEXAMETHASONE SODIUM PHOSPHATE: 10 INJECTION, SOLUTION INTRAMUSCULAR; INTRAVENOUS at 13:02

## 2022-03-07 RX ADMIN — SODIUM CHLORIDE 20 ML/HR: 9 INJECTION, SOLUTION INTRAVENOUS at 13:02

## 2022-03-07 RX ADMIN — LEUCOVORIN CALCIUM 500 MG: 500 INJECTION, POWDER, LYOPHILIZED, FOR SOLUTION INTRAMUSCULAR; INTRAVENOUS at 13:52

## 2022-03-07 NOTE — PROGRESS NOTES
Most recent labs reviewed  Pt tolerated todays leucovorin and adrucil well  Port flushed and deaccessed per routine  Excellent blood return noted   Discharged ambulatory with avs

## 2022-03-09 ENCOUNTER — RA CDI HCC (OUTPATIENT)
Dept: OTHER | Facility: HOSPITAL | Age: 67
End: 2022-03-09

## 2022-03-09 NOTE — PROGRESS NOTES
[facility]  coding opportunities             Chart Reviewed * (Number of) Inbasket suggestions sent to Provider: 2      I13 [id number]  [id number]    Appt:3/16/2022            Patients insurance company: [employer]

## 2022-03-12 ENCOUNTER — APPOINTMENT (OUTPATIENT)
Dept: LAB | Facility: CLINIC | Age: 67
End: 2022-03-12
Payer: MEDICARE

## 2022-03-12 DIAGNOSIS — T45.1X5A CHEMOTHERAPY-INDUCED NEUTROPENIA (HCC): ICD-10-CM

## 2022-03-12 DIAGNOSIS — C18.7 MALIGNANT NEOPLASM OF SIGMOID COLON (HCC): ICD-10-CM

## 2022-03-12 DIAGNOSIS — D70.1 CHEMOTHERAPY-INDUCED NEUTROPENIA (HCC): ICD-10-CM

## 2022-03-12 LAB
ALBUMIN SERPL BCP-MCNC: 3.6 G/DL (ref 3.5–5)
ALP SERPL-CCNC: 87 U/L (ref 46–116)
ALT SERPL W P-5'-P-CCNC: 28 U/L (ref 12–78)
ANION GAP SERPL CALCULATED.3IONS-SCNC: 8 MMOL/L (ref 4–13)
AST SERPL W P-5'-P-CCNC: 21 U/L (ref 5–45)
BASOPHILS # BLD AUTO: 0.07 THOUSANDS/ΜL (ref 0–0.1)
BASOPHILS NFR BLD AUTO: 1 % (ref 0–1)
BILIRUB SERPL-MCNC: 1.34 MG/DL (ref 0.2–1)
BUN SERPL-MCNC: 72 MG/DL (ref 5–25)
CALCIUM SERPL-MCNC: 9.3 MG/DL (ref 8.3–10.1)
CHLORIDE SERPL-SCNC: 102 MMOL/L (ref 100–108)
CO2 SERPL-SCNC: 27 MMOL/L (ref 21–32)
CREAT SERPL-MCNC: 4.07 MG/DL (ref 0.6–1.3)
EOSINOPHIL # BLD AUTO: 0.58 THOUSAND/ΜL (ref 0–0.61)
EOSINOPHIL NFR BLD AUTO: 5 % (ref 0–6)
ERYTHROCYTE [DISTWIDTH] IN BLOOD BY AUTOMATED COUNT: 15.8 % (ref 11.6–15.1)
GFR SERPL CREATININE-BSD FRML MDRD: 14 ML/MIN/1.73SQ M
GLUCOSE P FAST SERPL-MCNC: 87 MG/DL (ref 65–99)
HCT VFR BLD AUTO: 31.3 % (ref 36.5–49.3)
HGB BLD-MCNC: 10.9 G/DL (ref 12–17)
IMM GRANULOCYTES # BLD AUTO: 0.09 THOUSAND/UL (ref 0–0.2)
IMM GRANULOCYTES NFR BLD AUTO: 1 % (ref 0–2)
LYMPHOCYTES # BLD AUTO: 1.06 THOUSANDS/ΜL (ref 0.6–4.47)
LYMPHOCYTES NFR BLD AUTO: 9 % (ref 14–44)
MCH RBC QN AUTO: 31.1 PG (ref 26.8–34.3)
MCHC RBC AUTO-ENTMCNC: 34.8 G/DL (ref 31.4–37.4)
MCV RBC AUTO: 89 FL (ref 82–98)
MONOCYTES # BLD AUTO: 0.92 THOUSAND/ΜL (ref 0.17–1.22)
MONOCYTES NFR BLD AUTO: 8 % (ref 4–12)
NEUTROPHILS # BLD AUTO: 9.47 THOUSANDS/ΜL (ref 1.85–7.62)
NEUTS SEG NFR BLD AUTO: 76 % (ref 43–75)
NRBC BLD AUTO-RTO: 0 /100 WBCS
PLATELET # BLD AUTO: 241 THOUSANDS/UL (ref 149–390)
PMV BLD AUTO: 10.7 FL (ref 8.9–12.7)
POTASSIUM SERPL-SCNC: 3.5 MMOL/L (ref 3.5–5.3)
PROT SERPL-MCNC: 7.1 G/DL (ref 6.4–8.2)
RBC # BLD AUTO: 3.51 MILLION/UL (ref 3.88–5.62)
SODIUM SERPL-SCNC: 137 MMOL/L (ref 136–145)
WBC # BLD AUTO: 12.19 THOUSAND/UL (ref 4.31–10.16)

## 2022-03-12 PROCEDURE — 85025 COMPLETE CBC W/AUTO DIFF WBC: CPT

## 2022-03-12 PROCEDURE — 80053 COMPREHEN METABOLIC PANEL: CPT

## 2022-03-12 PROCEDURE — 36415 COLL VENOUS BLD VENIPUNCTURE: CPT

## 2022-03-14 ENCOUNTER — HOSPITAL ENCOUNTER (OUTPATIENT)
Dept: INFUSION CENTER | Facility: HOSPITAL | Age: 67
Discharge: HOME | End: 2022-03-14
Attending: INTERNAL MEDICINE
Payer: MEDICARE

## 2022-03-14 VITALS
WEIGHT: 198.63 LBS | BODY MASS INDEX: 28.44 KG/M2 | TEMPERATURE: 96.9 F | HEART RATE: 85 BPM | RESPIRATION RATE: 18 BRPM | HEIGHT: 70 IN | DIASTOLIC BLOOD PRESSURE: 68 MMHG | OXYGEN SATURATION: 90 % | SYSTOLIC BLOOD PRESSURE: 148 MMHG

## 2022-03-14 DIAGNOSIS — T45.1X5A CHEMOTHERAPY-INDUCED NEUTROPENIA (HCC): ICD-10-CM

## 2022-03-14 DIAGNOSIS — C18.7 MALIGNANT NEOPLASM OF SIGMOID COLON (HCC): Primary | ICD-10-CM

## 2022-03-14 DIAGNOSIS — D70.1 CHEMOTHERAPY-INDUCED NEUTROPENIA (HCC): ICD-10-CM

## 2022-03-14 PROCEDURE — 96366 THER/PROPH/DIAG IV INF ADDON: CPT

## 2022-03-14 PROCEDURE — 96409 CHEMO IV PUSH SNGL DRUG: CPT

## 2022-03-14 PROCEDURE — 96367 TX/PROPH/DG ADDL SEQ IV INF: CPT

## 2022-03-14 RX ORDER — SODIUM CHLORIDE 9 MG/ML
20 INJECTION, SOLUTION INTRAVENOUS ONCE AS NEEDED
Status: DISCONTINUED | OUTPATIENT
Start: 2022-03-14 | End: 2022-03-18 | Stop reason: HOSPADM

## 2022-03-14 RX ORDER — DEXTROSE MONOHYDRATE 50 MG/ML
20 INJECTION, SOLUTION INTRAVENOUS ONCE
Status: DISCONTINUED | OUTPATIENT
Start: 2022-03-14 | End: 2022-03-18 | Stop reason: HOSPADM

## 2022-03-14 RX ORDER — FLUOROURACIL 50 MG/ML
500 INJECTION, SOLUTION INTRAVENOUS ONCE
Status: COMPLETED | OUTPATIENT
Start: 2022-03-14 | End: 2022-03-14

## 2022-03-14 RX ADMIN — LEUCOVORIN CALCIUM 500 MG: 500 INJECTION, POWDER, LYOPHILIZED, FOR SOLUTION INTRAMUSCULAR; INTRAVENOUS at 13:33

## 2022-03-14 RX ADMIN — FLUOROURACIL 500 MG: 50 INJECTION, SOLUTION INTRAVENOUS at 15:35

## 2022-03-14 RX ADMIN — DEXAMETHASONE SODIUM PHOSPHATE: 10 INJECTION, SOLUTION INTRAMUSCULAR; INTRAVENOUS at 12:47

## 2022-03-14 NOTE — PROGRESS NOTES
Most recent labs and Doctors note reviewed  Pt tolerated today leucovorin and adrucil push well  Good blood return noted from port  Port flushed and deaccessed per routine   Discharged ambulatory with avs

## 2022-03-15 ENCOUNTER — PATIENT MESSAGE (OUTPATIENT)
Dept: NEPHROLOGY | Facility: HOSPITAL | Age: 67
End: 2022-03-15

## 2022-03-15 DIAGNOSIS — N18.4 STAGE 4 CHRONIC KIDNEY DISEASE (HCC): Primary | ICD-10-CM

## 2022-03-18 ENCOUNTER — APPOINTMENT (OUTPATIENT)
Dept: LAB | Facility: CLINIC | Age: 67
End: 2022-03-18
Payer: MEDICARE

## 2022-03-18 DIAGNOSIS — N18.4 STAGE 4 CHRONIC KIDNEY DISEASE (HCC): ICD-10-CM

## 2022-03-18 DIAGNOSIS — D70.1 CHEMOTHERAPY-INDUCED NEUTROPENIA (HCC): ICD-10-CM

## 2022-03-18 DIAGNOSIS — T45.1X5A CHEMOTHERAPY-INDUCED NEUTROPENIA (HCC): ICD-10-CM

## 2022-03-18 DIAGNOSIS — C18.7 MALIGNANT NEOPLASM OF SIGMOID COLON (HCC): ICD-10-CM

## 2022-03-18 LAB
ALBUMIN SERPL BCP-MCNC: 3.4 G/DL (ref 3.5–5)
ALP SERPL-CCNC: 93 U/L (ref 46–116)
ALT SERPL W P-5'-P-CCNC: 48 U/L (ref 12–78)
ANION GAP SERPL CALCULATED.3IONS-SCNC: 5 MMOL/L (ref 4–13)
AST SERPL W P-5'-P-CCNC: 20 U/L (ref 5–45)
BASOPHILS # BLD AUTO: 0.06 THOUSANDS/ΜL (ref 0–0.1)
BASOPHILS NFR BLD AUTO: 1 % (ref 0–1)
BILIRUB SERPL-MCNC: 1.1 MG/DL (ref 0.2–1)
BUN SERPL-MCNC: 59 MG/DL (ref 5–25)
CALCIUM ALBUM COR SERPL-MCNC: 9.9 MG/DL (ref 8.3–10.1)
CALCIUM SERPL-MCNC: 9.4 MG/DL (ref 8.3–10.1)
CHLORIDE SERPL-SCNC: 106 MMOL/L (ref 100–108)
CO2 SERPL-SCNC: 26 MMOL/L (ref 21–32)
CREAT SERPL-MCNC: 3.26 MG/DL (ref 0.6–1.3)
EOSINOPHIL # BLD AUTO: 0.55 THOUSAND/ΜL (ref 0–0.61)
EOSINOPHIL NFR BLD AUTO: 4 % (ref 0–6)
ERYTHROCYTE [DISTWIDTH] IN BLOOD BY AUTOMATED COUNT: 16.4 % (ref 11.6–15.1)
GFR SERPL CREATININE-BSD FRML MDRD: 18 ML/MIN/1.73SQ M
GLUCOSE P FAST SERPL-MCNC: 96 MG/DL (ref 65–99)
HCT VFR BLD AUTO: 30.9 % (ref 36.5–49.3)
HGB BLD-MCNC: 10.6 G/DL (ref 12–17)
IMM GRANULOCYTES # BLD AUTO: 0.11 THOUSAND/UL (ref 0–0.2)
IMM GRANULOCYTES NFR BLD AUTO: 1 % (ref 0–2)
LYMPHOCYTES # BLD AUTO: 1.22 THOUSANDS/ΜL (ref 0.6–4.47)
LYMPHOCYTES NFR BLD AUTO: 10 % (ref 14–44)
MCH RBC QN AUTO: 30.7 PG (ref 26.8–34.3)
MCHC RBC AUTO-ENTMCNC: 34.3 G/DL (ref 31.4–37.4)
MCV RBC AUTO: 90 FL (ref 82–98)
MONOCYTES # BLD AUTO: 1.15 THOUSAND/ΜL (ref 0.17–1.22)
MONOCYTES NFR BLD AUTO: 9 % (ref 4–12)
NEUTROPHILS # BLD AUTO: 9.37 THOUSANDS/ΜL (ref 1.85–7.62)
NEUTS SEG NFR BLD AUTO: 75 % (ref 43–75)
NRBC BLD AUTO-RTO: 0 /100 WBCS
PLATELET # BLD AUTO: 226 THOUSANDS/UL (ref 149–390)
PMV BLD AUTO: 10.3 FL (ref 8.9–12.7)
POTASSIUM SERPL-SCNC: 3.7 MMOL/L (ref 3.5–5.3)
PROT SERPL-MCNC: 6.8 G/DL (ref 6.4–8.2)
RBC # BLD AUTO: 3.45 MILLION/UL (ref 3.88–5.62)
SODIUM SERPL-SCNC: 137 MMOL/L (ref 136–145)
WBC # BLD AUTO: 12.46 THOUSAND/UL (ref 4.31–10.16)

## 2022-03-18 PROCEDURE — 85025 COMPLETE CBC W/AUTO DIFF WBC: CPT

## 2022-03-18 PROCEDURE — 36415 COLL VENOUS BLD VENIPUNCTURE: CPT

## 2022-03-18 PROCEDURE — 80053 COMPREHEN METABOLIC PANEL: CPT

## 2022-03-21 ENCOUNTER — HOSPITAL ENCOUNTER (OUTPATIENT)
Dept: INFUSION CENTER | Facility: HOSPITAL | Age: 67
Discharge: HOME | End: 2022-03-21
Attending: INTERNAL MEDICINE
Payer: MEDICARE

## 2022-03-21 VITALS
BODY MASS INDEX: 29.23 KG/M2 | RESPIRATION RATE: 18 BRPM | HEART RATE: 83 BPM | WEIGHT: 204.15 LBS | OXYGEN SATURATION: 94 % | SYSTOLIC BLOOD PRESSURE: 136 MMHG | DIASTOLIC BLOOD PRESSURE: 64 MMHG | TEMPERATURE: 96.7 F | HEIGHT: 70 IN

## 2022-03-21 DIAGNOSIS — C18.7 MALIGNANT NEOPLASM OF SIGMOID COLON (HCC): Primary | ICD-10-CM

## 2022-03-21 DIAGNOSIS — T45.1X5A CHEMOTHERAPY-INDUCED NEUTROPENIA (HCC): ICD-10-CM

## 2022-03-21 DIAGNOSIS — D70.1 CHEMOTHERAPY-INDUCED NEUTROPENIA (HCC): ICD-10-CM

## 2022-03-21 PROCEDURE — 96409 CHEMO IV PUSH SNGL DRUG: CPT

## 2022-03-21 PROCEDURE — 96366 THER/PROPH/DIAG IV INF ADDON: CPT

## 2022-03-21 PROCEDURE — 96367 TX/PROPH/DG ADDL SEQ IV INF: CPT

## 2022-03-21 RX ORDER — FLUOROURACIL 50 MG/ML
500 INJECTION, SOLUTION INTRAVENOUS ONCE
Status: COMPLETED | OUTPATIENT
Start: 2022-03-21 | End: 2022-03-21

## 2022-03-21 RX ORDER — SODIUM CHLORIDE 9 MG/ML
20 INJECTION, SOLUTION INTRAVENOUS ONCE AS NEEDED
Status: DISCONTINUED | OUTPATIENT
Start: 2022-03-21 | End: 2022-03-25 | Stop reason: HOSPADM

## 2022-03-21 RX ORDER — DEXTROSE MONOHYDRATE 50 MG/ML
20 INJECTION, SOLUTION INTRAVENOUS ONCE
Status: DISCONTINUED | OUTPATIENT
Start: 2022-03-21 | End: 2022-03-25 | Stop reason: HOSPADM

## 2022-03-21 RX ADMIN — FLUOROURACIL 500 MG: 50 INJECTION, SOLUTION INTRAVENOUS at 12:06

## 2022-03-21 RX ADMIN — DEXAMETHASONE SODIUM PHOSPHATE: 10 INJECTION, SOLUTION INTRAMUSCULAR; INTRAVENOUS at 09:24

## 2022-03-21 RX ADMIN — LEUCOVORIN CALCIUM 500 MG: 500 INJECTION, POWDER, LYOPHILIZED, FOR SOLUTION INTRAMUSCULAR; INTRAVENOUS at 10:03

## 2022-03-21 RX ADMIN — SODIUM CHLORIDE 20 ML/HR: 0.9 INJECTION, SOLUTION INTRAVENOUS at 09:15

## 2022-03-21 NOTE — PROGRESS NOTES
Pt care assumed from George Escalona  Leucovorin completed without issue  Adrucil given slow IVP  Good blood return noted before during and after push med  Port flushed and deaccessed per routine   Discharged ambulatory with avs

## 2022-03-24 ENCOUNTER — OFFICE VISIT (OUTPATIENT)
Dept: HEMATOLOGY ONCOLOGY | Facility: CLINIC | Age: 67
End: 2022-03-24
Payer: MEDICARE

## 2022-03-24 VITALS
HEIGHT: 70 IN | RESPIRATION RATE: 18 BRPM | HEART RATE: 86 BPM | WEIGHT: 207 LBS | TEMPERATURE: 96.9 F | DIASTOLIC BLOOD PRESSURE: 80 MMHG | SYSTOLIC BLOOD PRESSURE: 140 MMHG | OXYGEN SATURATION: 92 % | BODY MASS INDEX: 29.63 KG/M2

## 2022-03-24 DIAGNOSIS — D64.9 ANEMIA, UNSPECIFIED TYPE: ICD-10-CM

## 2022-03-24 DIAGNOSIS — T45.1X5A CHEMOTHERAPY-INDUCED NEUTROPENIA (HCC): ICD-10-CM

## 2022-03-24 DIAGNOSIS — C18.7 MALIGNANT NEOPLASM OF SIGMOID COLON (HCC): Primary | ICD-10-CM

## 2022-03-24 DIAGNOSIS — M10.9 GOUT, UNSPECIFIED CAUSE, UNSPECIFIED CHRONICITY, UNSPECIFIED SITE: ICD-10-CM

## 2022-03-24 DIAGNOSIS — D70.1 CHEMOTHERAPY-INDUCED NEUTROPENIA (HCC): ICD-10-CM

## 2022-03-24 DIAGNOSIS — D51.3 OTHER DIETARY VITAMIN B12 DEFICIENCY ANEMIA: ICD-10-CM

## 2022-03-24 PROCEDURE — 99214 OFFICE O/P EST MOD 30 MIN: CPT | Performed by: INTERNAL MEDICINE

## 2022-03-24 RX ORDER — SODIUM CHLORIDE 9 MG/ML
20 INJECTION, SOLUTION INTRAVENOUS ONCE AS NEEDED
Status: CANCELLED | OUTPATIENT
Start: 2022-05-17

## 2022-03-24 RX ORDER — SODIUM CHLORIDE 9 MG/ML
20 INJECTION, SOLUTION INTRAVENOUS ONCE AS NEEDED
Status: CANCELLED | OUTPATIENT
Start: 2022-04-18

## 2022-03-24 RX ORDER — SODIUM CHLORIDE 9 MG/ML
20 INJECTION, SOLUTION INTRAVENOUS ONCE AS NEEDED
Status: CANCELLED | OUTPATIENT
Start: 2022-05-24

## 2022-03-24 RX ORDER — DEXTROSE MONOHYDRATE 50 MG/ML
20 INJECTION, SOLUTION INTRAVENOUS ONCE
Status: CANCELLED | OUTPATIENT
Start: 2022-05-03

## 2022-03-24 RX ORDER — FLUOROURACIL 50 MG/ML
250 INJECTION, SOLUTION INTRAVENOUS ONCE
Status: CANCELLED | OUTPATIENT
Start: 2022-04-25

## 2022-03-24 RX ORDER — DEXTROSE MONOHYDRATE 50 MG/ML
20 INJECTION, SOLUTION INTRAVENOUS ONCE
Status: CANCELLED | OUTPATIENT
Start: 2022-05-24

## 2022-03-24 RX ORDER — FLUOROURACIL 50 MG/ML
250 INJECTION, SOLUTION INTRAVENOUS ONCE
Status: CANCELLED | OUTPATIENT
Start: 2022-05-16

## 2022-03-24 RX ORDER — SODIUM CHLORIDE 9 MG/ML
20 INJECTION, SOLUTION INTRAVENOUS ONCE AS NEEDED
Status: CANCELLED | OUTPATIENT
Start: 2022-05-10

## 2022-03-24 RX ORDER — FLUOROURACIL 50 MG/ML
250 INJECTION, SOLUTION INTRAVENOUS ONCE
Status: CANCELLED | OUTPATIENT
Start: 2022-05-02

## 2022-03-24 RX ORDER — SODIUM CHLORIDE 9 MG/ML
20 INJECTION, SOLUTION INTRAVENOUS ONCE AS NEEDED
Status: CANCELLED | OUTPATIENT
Start: 2022-05-03

## 2022-03-24 RX ORDER — FLUOROURACIL 50 MG/ML
250 INJECTION, SOLUTION INTRAVENOUS ONCE
Status: CANCELLED | OUTPATIENT
Start: 2022-05-09

## 2022-03-24 RX ORDER — FLUOROURACIL 50 MG/ML
250 INJECTION, SOLUTION INTRAVENOUS ONCE
Status: CANCELLED | OUTPATIENT
Start: 2022-04-18

## 2022-03-24 RX ORDER — DEXTROSE MONOHYDRATE 50 MG/ML
20 INJECTION, SOLUTION INTRAVENOUS ONCE
Status: CANCELLED | OUTPATIENT
Start: 2022-05-17

## 2022-03-24 RX ORDER — ALLOPURINOL 300 MG/1
300 TABLET ORAL EVERY MORNING
Qty: 90 TABLET | Refills: 0 | Status: SHIPPED | OUTPATIENT
Start: 2022-03-24 | End: 2022-06-11 | Stop reason: SDUPTHER

## 2022-03-24 RX ORDER — DEXTROSE MONOHYDRATE 50 MG/ML
20 INJECTION, SOLUTION INTRAVENOUS ONCE
Status: CANCELLED | OUTPATIENT
Start: 2022-04-18

## 2022-03-24 RX ORDER — DEXTROSE MONOHYDRATE 50 MG/ML
20 INJECTION, SOLUTION INTRAVENOUS ONCE
Status: CANCELLED | OUTPATIENT
Start: 2022-05-10

## 2022-03-24 NOTE — PROGRESS NOTES
Hematology/Oncology Outpatient Follow-up  Guillermina Buckley 74 y o  male 1955 [id number]    Date:  3/24/2022        Assessment and Plan:  1  Malignant neoplasm of sigmoid colon [facility])  Patient has abdominal changes suspicious for abdominal carcinomatosis according to the PET scan from 01/04/2022  He will be continue on the current palliative treatment with 5 fluorouracil/leucovorin IV at the 50% dose on a weekly basis 5 weeks on 1 week off  I did discuss with the patient and his wife eventually increasing the dose of the 5 FU gradual to reach the maximum tolerated dose without affecting his kidney function or quality of life  He was told that we will keep him at the current dose for this cycle and re-evaluate him again during the 2nd cycle in 4 weeks from now  At that time we can increase the dose by a 10 % increment if possible  - Infusion Calculated Appointment Request; Future  - CBC and differential; Future  - Comprehensive metabolic panel; Future  - Infusion Calculated Appointment Request; Future  - CBC and differential; Future  - Comprehensive metabolic panel; Future  - Infusion Calculated Appointment Request; Future  - CBC and differential; Future  - Comprehensive metabolic panel; Future  - Infusion Calculated Appointment Request; Future  - CBC and differential; Future  - Comprehensive metabolic panel; Future  - Infusion Calculated Appointment Request; Future  - CBC and differential; Future  - Comprehensive metabolic panel; Future  - CBC and differential; Future  - Comprehensive metabolic panel; Future  - Magnesium; Future  - Iron Panel (Includes Ferritin, Iron Sat%, Iron, and TIBC); Future  - Ferritin; Future  - Vitamin B12; Future    2  Chemotherapy-induced neutropenia (HCC)    - Infusion Calculated Appointment Request; Future  - CBC and differential; Future  - Comprehensive metabolic panel;  Future  - Infusion Calculated Appointment Request; Future  - CBC and differential; Future  - Comprehensive metabolic panel; Future  - Infusion Calculated Appointment Request; Future  - CBC and differential; Future  - Comprehensive metabolic panel; Future  - Infusion Calculated Appointment Request; Future  - CBC and differential; Future  - Comprehensive metabolic panel; Future  - Infusion Calculated Appointment Request; Future  - CBC and differential; Future  - Comprehensive metabolic panel; Future    3  Anemia, unspecified type  He seems to have low but stable hemoglobin level which is multifactorial including advanced stage chronic kidney disease  Blood work will be done prior to her next visit to see if we can improve his hemoglobin  - CBC and differential; Future  - Comprehensive metabolic panel; Future  - Magnesium; Future  - Iron Panel (Includes Ferritin, Iron Sat%, Iron, and TIBC); Future  - Ferritin; Future  - Vitamin B12; Future    4  Other dietary vitamin B12 deficiency anemia   The level will be checked prior to his next visit  - Vitamin B12; Future        HPI:  The patient came today for follow-up visit by his wife  He seems to be tolerating the 50% dose of the 5 fluorouracil/leucovorin which is being given IV push on a weekly basis 5 weeks on 1 week off  He received so far 3 weekly doses without significant complications  His most recent blood work from 03/18/2022 showed hemoglobin of 10 6 with white cell count of 12 4 and platelet count of 129  Absolute neutrophil count 9 3  Creatinine was 3 2 with normal calcium liver enzymes  Oncology History Overview Note   This is a 64-year-old male with multiple comorbid conditions including CHF, advanced chronic kidney disease, diabetes mellitus, arthritis, sleep apnea, etc   The patient apparently had multiple colonoscopies within this year  He was found to have adenocarcinoma in the sigmoid region rising from tubular adenoma on 01/08/2021    He had a PET-CT scan on 02/19/2021 which showed uptake in the sigmoid colon otherwise no finding for hypermetabolic metastasis was found  The patient had 2 other colonoscopies and finally had his laparoscopic surgery on 10/21/2021 which showed showed residual adenocarcinoma moderately differentiated arising in the tubular adenoma with high-grade dysplasia, pT3 with 3/16 lymph node involvement  All margins were negative without obvious lymphovascular or perineural invasion  The patient subsequently on 11/24/2021 had a CT scan of the chest abdomen pelvis and IV contrast was given by mistake which show resulted in significant worsening of his kidney function with creatinine around 7  The he required hospitalization  His baseline creatinine level is around 3  The CT scan on 11/24/2021 showed 1 new cm nodule ground-glass density in the superior segment of the right lower lobe which was thought to be inflammatory versus neoplastic  There are 2 areas of heterogeneous attenuation also in the abdominal area of unknown significance  Follow-up in 3 months was suggested       Colon cancer ([facility] )   10/23/2021 Initial Diagnosis    Colon cancer ([facility] )     1/18/2022 - 1/20/2022 Chemotherapy    fluorouracil (ADRUCIL), 400 mg/m2 = 830 mg, Intravenous, Once, 1 of 1 cycle  Administration: 830 mg (1/18/2022)  leucovorin calcium IVPB, 828 mg, Intravenous, Once, 1 of 12 cycles  Administration: 800 mg (1/18/2022)  fluorouracil (ADRUCIL) ambulatory infusion Soln, 1,200 mg/m2/day = 4,970 mg, Intravenous, Over 46 hours, 1 of 12 cycles  Dose modification: 800 mg/m2/day (original dose 1,200 mg/m2/day, Cycle 2, Reason: Other (Must fill in a comment), Comment: Dr young )     3/7/2022 -  Chemotherapy    fluorouracil (ADRUCIL), 515 mg (62 5 % of original dose 400 mg/m2), Intravenous, Once, 1 of 12 cycles  Dose modification: 250 mg/m2 (original dose 400 mg/m2, Cycle 1, Reason: Dose modified as per discussion with consulting physician)  Administration: 500 mg (3/7/2022), 500 mg (3/14/2022), 500 mg (3/21/2022)  leucovorin calcium IVPB, 515 mg (62 5 % of original dose 400 mg/m2), Intravenous, Once, 1 of 12 cycles  Dose modification: 250 mg/m2 (original dose 400 mg/m2, Cycle 1, Reason: Dose modified as per discussion with consulting physician)  Administration: 500 mg (3/7/2022), 500 mg (3/14/2022), 500 mg (3/21/2022)  fluorouracil (ADRUCIL) ambulatory infusion Soln, 1,200 mg/m2/day = 4,945 mg, Intravenous, Over 46 hours, 0 of 11 cycles         Interval history:    ROS: Review of Systems   Constitutional: Positive for fatigue  Negative for chills and fever  HENT: Negative for ear pain and sore throat  Eyes: Negative for pain and visual disturbance  Respiratory: Positive for shortness of breath  Negative for cough  Cardiovascular: Negative for chest pain and palpitations  Gastrointestinal: Positive for constipation and nausea  Negative for abdominal pain and vomiting  Genitourinary: Negative for dysuria and hematuria  Musculoskeletal: Negative for arthralgias and back pain  Skin: Positive for rash  Negative for color change  Neurological: Negative for seizures and syncope  All other systems reviewed and are negative        Past Medical History:   Diagnosis Date    Anemia     iron def    Arthritis     OA    Bruise of both arms     forearms and both hands    Bruises easily     Cancer ([facility] [address] ) 09/01/2021    colon    CHF (congestive heart failure) (HCC)     Chronic kidney disease     CPAP (continuous positive airway pressure) dependence     Diabetes mellitus ([facility] )     Diabetic foot ulcer ([facility] )     Eczema     Erectile dysfunction     Gout     Heart murmur     History of permanent cardiac pacemaker placement 11/2018    Hyperlipidemia     Hypertension     Hypoglycemia 3/8/2019    Murmur     Nephropathy     Osteoarthritis     Pacemaker 11/06/2018    [facility]     Seasonal allergies     Sleep apnea     Toe infection     bilat great toes    Vertigo     Walks frequently     Wears dentures     upper    Wears glasses        Past Surgical History:   Procedure Laterality Date    CARDIAC PACEMAKER PLACEMENT      CARPAL TUNNEL RELEASE Left     CARPAL TUNNEL RELEASE Right     CARPAL TUNNEL RELEASE      COLONOSCOPY  08/2020    COLONOSCOPY      FL GUIDED CENTRAL VENOUS ACCESS DEVICE INSERTION  1/11/2022    FOOT AMPUTATION Left 2/10/2020    Procedure: PARTIAL 1ST RAY AMPUTATION;  Surgeon: Ida Ortiz DPM;  [facility];  Service: Podiatry    INCISION AND DRAINAGE OF WOUND Left 1/12/2020    Procedure: INCISION AND DRAINAGE (I&D) EXTREMITY AND REMOVAL OF SESMOID BONE;  Surgeon: Ofe Marrero DPM;  [facility];  Service: Podiatry    IR OTHER  1/21/2022    IR PICC REPOSITION  1/14/2020    KNEE ARTHROSCOPY Left     KNEE ARTHROSCOPY Right     KNEE SURGERY      HI AMPUTATION TOE,I-P JT Bilateral 5/2/2017    Procedure: PARTIAL AMPUTATION RIGHT AND LEFT HALLUX ;  Surgeon: Ida Ortiz DPM;  [facility];  Service: Podiatry    HI AMPUTATION TOE,MT-P JT Left 7/25/2017    Procedure: 2ND TOE AMPUTATION;  Surgeon: Ida Ortiz DPM;  [facility];  Service: Podiatry    HI INSJ TUNNELED CTR VAD W/SUBQ PORT AGE 5 YR/> N/A 1/11/2022    Procedure: INSERTION VENOUS PORT ( PORT-A-CATH) IR;  Surgeon: Jhoana Headley DO;  [facility];  Service: Interventional Radiology    RESECTION LOW ANTERIOR LAPAROSCOPIC N/A 10/21/2021    Procedure: RESECTION LOW ANTERIOR LAPAROSCOPIC;  Surgeon: Viki Patel MD;  [facility];  Service: Colorectal    SHOULDER ARTHROSCOPY Left     with screws,RTC    SHOULDER SURGERY      TOE AMPUTATION Left 1/8/2020    Procedure: HALLUX AMPUTATION;  Surgeon: Ofe Marrero DPM;  [facility];  Service: Podiatry    UPPER GASTROINTESTINAL ENDOSCOPY  03/2020    Intestinal metaplasia prepyloric    VASECTOMY         Social History     Socioeconomic History    Marital status: /Civil Union     Spouse name: None    Number of children: None    Years of education: None    Highest education level: None   Occupational History    None   Tobacco Use    Smoking status: Former Smoker     Packs/day: 0 50     Years: 20 00     Pack years: 10 00     Types: Cigarettes     Start date: 1     Quit date:      Years since quittin 2    Smokeless tobacco: Never Used    Tobacco comment: quit 10 years ago   Vaping Use    Vaping Use: Never used   Substance and Sexual Activity    Alcohol use: Never     Alcohol/week: 0 0 standard drinks    Drug use: No    Sexual activity: Not Currently     Partners: Female   Other Topics Concern    None   Social History Narrative    ** Merged History Encounter **         Daily cola consumption (2 cans/day)     Social Determinants of Health     Financial Resource Strain: Not on file   Food Insecurity: No Food Insecurity    Worried About Running Out of Food in the Last Year: Never true    Joel of Food in the Last Year: Never true   Transportation Needs: No Transportation Needs    Lack of Transportation (Medical): No    Lack of Transportation (Non-Medical): No   Physical Activity: Not on file   Stress: Not on file   Social Connections: Not on file   Intimate Partner Violence: Not on file   Housing Stability: Low Risk     Unable to Pay for Housing in the Last Year: No    Number of Places Lived in the Last Year: 1    Unstable Housing in the Last Year: No       Family History   Problem Relation Age of Onset    Heart disease Father         passed away    Hypertension Father     Pulmonary embolism Father     Hypertension Mother         assed away       Allergies   Allergen Reactions    Invokana [Canagliflozin] Other (See Comments)     Caused circulation problems    Lamisil [Terbinafine] Blisters     Wife states " His skin peeled from head to toe"    Other Swelling     Pomegranate - facial swelling, no swelling of tongue, esophagus  Adhesive tape        Latex Rash         Current Outpatient Medications:    allopurinol (ZYLOPRIM) 300 mg tablet, Take 1 tablet (300 mg total) by mouth every morning, Disp: 90 tablet, Rfl: 0    amLODIPine (NORVASC) 10 mg tablet, Take 1 tablet (10 mg total) by mouth daily, Disp: 90 tablet, Rfl: 3    apixaban (ELIQUIS) 5 mg, Take 1 tablet (5 mg total) by mouth every 12 (twelve) hours, Disp: 180 tablet, Rfl: 3    Dupilumab (Dupixent) 300 MG/2ML SOPN, Inject 300 mg under the skin Once every 2 weeks, Disp: , Rfl:     famotidine (PEPCID) 40 MG tablet, TAKE 1 TABLET BY MOUTH EVERY DAY, Disp: 90 tablet, Rfl: 3    Insulin Degludec-Liraglutide (Xultophy) 100 units-3 6 mg/mL injection pen, Inject 19 units daily  , Disp: 18 mL, Rfl: 1    Insulin Pen Needle (BD Pen Needle Zenaida U/F) 32G X 4 MM MISC, Use 1 daily, Disp: 100 each, Rfl: 2    metolazone (ZAROXOLYN) 5 mg tablet, Take 1 tablet (5 mg total) by mouth as needed (1-2 x /week for worsening swelling), Disp: 12 tablet, Rfl: 3    metoprolol tartrate (LOPRESSOR) 50 mg tablet, Take 1 tablet (50 mg total) by mouth every 12 (twelve) hours, Disp: 180 tablet, Rfl: 3    Multiple Vitamin (MULTIVITAMIN) tablet, Take 1 tablet by mouth daily IN AM, Disp: , Rfl:     omega-3-acid ethyl esters (LOVAZA) 1 g capsule, Take 2 capsules (2 g total) by mouth 2 (two) times a day Mail print prescription to pt, Disp: 360 capsule, Rfl: 3    ondansetron (ZOFRAN) 8 mg tablet, Take 1 tablet (8 mg total) by mouth every 8 (eight) hours as needed for nausea or vomiting, Disp: 20 tablet, Rfl: 3    OneTouch Ultra test strip, USE TO TEST BLOOD SUGAR 3 TIMES A DAY, Disp: 100 each, Rfl: 3    simvastatin (ZOCOR) 20 mg tablet, Take 1 tablet (20 mg total) by mouth daily at bedtime, Disp: 90 tablet, Rfl: 3    sodium bicarbonate 650 mg tablet, Take 1 tablet (650 mg total) by mouth 2 (two) times daily after meals, Disp: 180 tablet, Rfl: 0    tamsulosin (FLOMAX) 0 4 mg, TAKE 1 CAPSULE BY MOUTH EVERY DAY WITH DINNER, Disp: 90 capsule, Rfl: 3    torsemide (DEMADEX) 20 mg tablet, Take 2 tablets (40 mg total) by mouth 2 (two) times a day 40mg am and 20mg pm, Disp: , Rfl: 3  No current facility-administered medications for this visit  Facility-Administered Medications Ordered in Other Visits:     dextrose 5 % infusion, 20 mL/hr, Intravenous, Once, Travis Sánchez MD    sodium chloride 0 9 % infusion, 20 mL/hr, Intravenous, Once PRN, Travis Sánchez MD, Stopped at 03/21/22 1210      Physical Exam:  /80 (BP Location: Right arm, Patient Position: Sitting, Cuff Size: Adult)   Pulse 86   Temp (!) 96 9 °F (36 1 °C)   Resp 18   Ht 5' 10" (1 778 m)   Wt 93 9 kg (207 lb)   SpO2 92%   BMI 29 70 kg/m²     Physical Exam  Constitutional:       Appearance: He is well-developed  He is ill-appearing  HENT:      Head: Normocephalic and atraumatic  Eyes:      General: No scleral icterus  Right eye: No discharge  Left eye: No discharge  Conjunctiva/sclera: Conjunctivae normal       Pupils: Pupils are equal, round, and reactive to light  Neck:      Thyroid: No thyromegaly  Trachea: No tracheal deviation  Cardiovascular:      Rate and Rhythm: Normal rate and regular rhythm  Heart sounds: Murmur heard  No friction rub  Pulmonary:      Effort: Pulmonary effort is normal  No respiratory distress  Breath sounds: Normal breath sounds  No wheezing or rales  Chest:      Chest wall: No tenderness  Abdominal:      General: There is no distension  Palpations: Abdomen is soft  There is no hepatomegaly or splenomegaly  Tenderness: There is no abdominal tenderness  There is no guarding or rebound  Musculoskeletal:         General: No tenderness or deformity  Cervical back: Normal range of motion and neck supple  Right lower leg: Edema present  Left lower leg: Edema present  Comments: Boot around the left foot   Lymphadenopathy:      Cervical: No cervical adenopathy  Skin:     General: Skin is warm and dry        Coloration: Skin is not pale  Findings: No erythema or rash  Neurological:      Mental Status: He is alert and oriented to person, place, and time  Cranial Nerves: No cranial nerve deficit  Coordination: Coordination normal       Deep Tendon Reflexes: Reflexes are normal and symmetric  Psychiatric:         Behavior: Behavior normal          Thought Content: Thought content normal          Judgment: Judgment normal            Labs:  Lab Results   Component Value Date    WBC 12 46 (H) 03/18/2022    HGB 10 6 (L) 03/18/2022    HCT 30 9 (L) 03/18/2022    MCV 90 03/18/2022     03/18/2022     Lab Results   Component Value Date     (L) 04/10/2017    K 3 7 03/18/2022     03/18/2022    CO2 26 03/18/2022    BUN 59 (H) 03/18/2022    CREATININE 3 26 (H) 03/18/2022    GLUCOSE 142 (H) 11/01/2018    GLUF 96 03/18/2022    CALCIUM 9 4 03/18/2022    CORRECTEDCA 9 9 03/18/2022    AST 20 03/18/2022    ALT 48 03/18/2022    ALKPHOS 93 03/18/2022    PROT 6 9 04/10/2017    BILITOT 0 7 04/10/2017    EGFR 18 03/18/2022     No results found for: TSH    Patient voiced understanding and agreement in the above discussion  Aware to contact our office with questions/symptoms in the interim

## 2022-03-24 NOTE — TELEPHONE ENCOUNTER
Failed protocol    Endocrinology:  Gout Agents - allopurinol Failed 03/24/2022 07:18 AM    Cr in normal range and within 360 days    Uric Acid in normal range and within 180 days    Valid encounter within last 12 months

## 2022-03-25 ENCOUNTER — APPOINTMENT (OUTPATIENT)
Dept: LAB | Facility: CLINIC | Age: 67
End: 2022-03-25
Payer: MEDICARE

## 2022-03-25 DIAGNOSIS — C18.7 MALIGNANT NEOPLASM OF SIGMOID COLON (HCC): ICD-10-CM

## 2022-03-25 DIAGNOSIS — D70.1 CHEMOTHERAPY-INDUCED NEUTROPENIA (HCC): ICD-10-CM

## 2022-03-25 DIAGNOSIS — T45.1X5A CHEMOTHERAPY-INDUCED NEUTROPENIA (HCC): ICD-10-CM

## 2022-03-25 LAB
ALBUMIN SERPL BCP-MCNC: 3.8 G/DL (ref 3.5–5)
ALP SERPL-CCNC: 101 U/L (ref 46–116)
ALT SERPL W P-5'-P-CCNC: 40 U/L (ref 12–78)
ANION GAP SERPL CALCULATED.3IONS-SCNC: 7 MMOL/L (ref 4–13)
AST SERPL W P-5'-P-CCNC: 17 U/L (ref 5–45)
BASOPHILS # BLD AUTO: 0.04 THOUSANDS/ΜL (ref 0–0.1)
BASOPHILS NFR BLD AUTO: 0 % (ref 0–1)
BILIRUB SERPL-MCNC: 1.28 MG/DL (ref 0.2–1)
BUN SERPL-MCNC: 68 MG/DL (ref 5–25)
CALCIUM SERPL-MCNC: 9.5 MG/DL (ref 8.3–10.1)
CHLORIDE SERPL-SCNC: 102 MMOL/L (ref 100–108)
CO2 SERPL-SCNC: 27 MMOL/L (ref 21–32)
CREAT SERPL-MCNC: 3.49 MG/DL (ref 0.6–1.3)
EOSINOPHIL # BLD AUTO: 0.26 THOUSAND/ΜL (ref 0–0.61)
EOSINOPHIL NFR BLD AUTO: 2 % (ref 0–6)
ERYTHROCYTE [DISTWIDTH] IN BLOOD BY AUTOMATED COUNT: 16.7 % (ref 11.6–15.1)
GFR SERPL CREATININE-BSD FRML MDRD: 16 ML/MIN/1.73SQ M
GLUCOSE P FAST SERPL-MCNC: 115 MG/DL (ref 65–99)
HCT VFR BLD AUTO: 33.2 % (ref 36.5–49.3)
HGB BLD-MCNC: 10.9 G/DL (ref 12–17)
IMM GRANULOCYTES # BLD AUTO: 0.07 THOUSAND/UL (ref 0–0.2)
IMM GRANULOCYTES NFR BLD AUTO: 1 % (ref 0–2)
LYMPHOCYTES # BLD AUTO: 1.06 THOUSANDS/ΜL (ref 0.6–4.47)
LYMPHOCYTES NFR BLD AUTO: 9 % (ref 14–44)
MCH RBC QN AUTO: 31.2 PG (ref 26.8–34.3)
MCHC RBC AUTO-ENTMCNC: 32.8 G/DL (ref 31.4–37.4)
MCV RBC AUTO: 95 FL (ref 82–98)
MONOCYTES # BLD AUTO: 1.15 THOUSAND/ΜL (ref 0.17–1.22)
MONOCYTES NFR BLD AUTO: 9 % (ref 4–12)
NEUTROPHILS # BLD AUTO: 9.89 THOUSANDS/ΜL (ref 1.85–7.62)
NEUTS SEG NFR BLD AUTO: 79 % (ref 43–75)
NRBC BLD AUTO-RTO: 0 /100 WBCS
PLATELET # BLD AUTO: 222 THOUSANDS/UL (ref 149–390)
PMV BLD AUTO: 10.8 FL (ref 8.9–12.7)
POTASSIUM SERPL-SCNC: 3.4 MMOL/L (ref 3.5–5.3)
PROT SERPL-MCNC: 7 G/DL (ref 6.4–8.2)
RBC # BLD AUTO: 3.49 MILLION/UL (ref 3.88–5.62)
SODIUM SERPL-SCNC: 136 MMOL/L (ref 136–145)
WBC # BLD AUTO: 12.47 THOUSAND/UL (ref 4.31–10.16)

## 2022-03-25 PROCEDURE — 85025 COMPLETE CBC W/AUTO DIFF WBC: CPT

## 2022-03-25 PROCEDURE — 36415 COLL VENOUS BLD VENIPUNCTURE: CPT

## 2022-03-25 PROCEDURE — 80053 COMPREHEN METABOLIC PANEL: CPT

## 2022-03-28 ENCOUNTER — HOSPITAL ENCOUNTER (OUTPATIENT)
Dept: INFUSION CENTER | Facility: HOSPITAL | Age: 67
Discharge: HOME | End: 2022-03-28
Attending: INTERNAL MEDICINE
Payer: MEDICARE

## 2022-03-28 VITALS — WEIGHT: 201.72 LBS | HEIGHT: 70 IN | BODY MASS INDEX: 28.88 KG/M2

## 2022-03-28 DIAGNOSIS — T45.1X5A CHEMOTHERAPY-INDUCED NEUTROPENIA (HCC): ICD-10-CM

## 2022-03-28 DIAGNOSIS — D70.1 CHEMOTHERAPY-INDUCED NEUTROPENIA (HCC): ICD-10-CM

## 2022-03-28 DIAGNOSIS — C18.7 MALIGNANT NEOPLASM OF SIGMOID COLON (HCC): Primary | ICD-10-CM

## 2022-03-28 PROCEDURE — 96366 THER/PROPH/DIAG IV INF ADDON: CPT

## 2022-03-28 PROCEDURE — 96367 TX/PROPH/DG ADDL SEQ IV INF: CPT

## 2022-03-28 PROCEDURE — 96409 CHEMO IV PUSH SNGL DRUG: CPT

## 2022-03-28 RX ORDER — FLUOROURACIL 50 MG/ML
500 INJECTION, SOLUTION INTRAVENOUS ONCE
Status: COMPLETED | OUTPATIENT
Start: 2022-03-28 | End: 2022-03-28

## 2022-03-28 RX ORDER — SODIUM CHLORIDE 9 MG/ML
20 INJECTION, SOLUTION INTRAVENOUS ONCE AS NEEDED
Status: DISCONTINUED | OUTPATIENT
Start: 2022-03-28 | End: 2022-04-01 | Stop reason: HOSPADM

## 2022-03-28 RX ORDER — DEXTROSE MONOHYDRATE 50 MG/ML
20 INJECTION, SOLUTION INTRAVENOUS ONCE
Status: DISCONTINUED | OUTPATIENT
Start: 2022-03-28 | End: 2022-04-01 | Stop reason: HOSPADM

## 2022-03-28 RX ADMIN — LEUCOVORIN CALCIUM 500 MG: 500 INJECTION, POWDER, LYOPHILIZED, FOR SOLUTION INTRAMUSCULAR; INTRAVENOUS at 10:43

## 2022-03-28 RX ADMIN — SODIUM CHLORIDE 20 ML/HR: 0.9 INJECTION, SOLUTION INTRAVENOUS at 10:03

## 2022-03-28 RX ADMIN — DEXAMETHASONE SODIUM PHOSPHATE: 10 INJECTION, SOLUTION INTRAMUSCULAR; INTRAVENOUS at 10:03

## 2022-03-28 RX ADMIN — FLUOROURACIL 500 MG: 50 INJECTION, SOLUTION INTRAVENOUS at 12:52

## 2022-03-28 NOTE — PROGRESS NOTES
Pt tolerated todays leucovorin and adrucil well  No adverse reactions noted  Good blood return before and after treatment  Port flushed and deaccessed per routine   Discharged ambulatory with avs

## 2022-03-28 NOTE — TELEPHONE ENCOUNTER
Called Brennan  and Venkat to see if he would like to r/s appointment that was scheduled a month ago    Gave our number for him to call back

## 2022-03-31 ENCOUNTER — OFFICE VISIT (OUTPATIENT)
Dept: FAMILY MEDICINE CLINIC | Facility: CLINIC | Age: 67
End: 2022-03-31
Payer: MEDICARE

## 2022-03-31 VITALS
HEIGHT: 70 IN | SYSTOLIC BLOOD PRESSURE: 132 MMHG | RESPIRATION RATE: 16 BRPM | TEMPERATURE: 97.7 F | DIASTOLIC BLOOD PRESSURE: 68 MMHG | OXYGEN SATURATION: 94 % | WEIGHT: 207 LBS | HEART RATE: 64 BPM | BODY MASS INDEX: 29.63 KG/M2

## 2022-03-31 DIAGNOSIS — I48.0 PAF (PAROXYSMAL ATRIAL FIBRILLATION) (HCC): ICD-10-CM

## 2022-03-31 DIAGNOSIS — N18.4 TYPE 2 DIABETES MELLITUS WITH STAGE 4 CHRONIC KIDNEY DISEASE, WITH LONG-TERM CURRENT USE OF INSULIN (HCC): Primary | ICD-10-CM

## 2022-03-31 DIAGNOSIS — E11.40 TYPE 2 DIABETES MELLITUS WITH DIABETIC NEUROPATHY, WITHOUT LONG-TERM CURRENT USE OF INSULIN (HCC): ICD-10-CM

## 2022-03-31 DIAGNOSIS — I10 PRIMARY HYPERTENSION: ICD-10-CM

## 2022-03-31 DIAGNOSIS — Z79.4 TYPE 2 DIABETES MELLITUS WITH STAGE 4 CHRONIC KIDNEY DISEASE, WITH LONG-TERM CURRENT USE OF INSULIN (HCC): Primary | ICD-10-CM

## 2022-03-31 DIAGNOSIS — E11.22 TYPE 2 DIABETES MELLITUS WITH STAGE 4 CHRONIC KIDNEY DISEASE, WITH LONG-TERM CURRENT USE OF INSULIN (HCC): Primary | ICD-10-CM

## 2022-03-31 DIAGNOSIS — E78.49 OTHER HYPERLIPIDEMIA: ICD-10-CM

## 2022-03-31 DIAGNOSIS — C18.7 MALIGNANT NEOPLASM OF SIGMOID COLON (HCC): ICD-10-CM

## 2022-03-31 DIAGNOSIS — N18.4 STAGE 4 CHRONIC KIDNEY DISEASE (HCC): ICD-10-CM

## 2022-03-31 DIAGNOSIS — G47.33 OSA (OBSTRUCTIVE SLEEP APNEA): ICD-10-CM

## 2022-03-31 DIAGNOSIS — I50.32 CHRONIC DIASTOLIC (CONGESTIVE) HEART FAILURE (HCC): ICD-10-CM

## 2022-03-31 PROCEDURE — 99214 OFFICE O/P EST MOD 30 MIN: CPT | Performed by: FAMILY MEDICINE

## 2022-03-31 NOTE — PROGRESS NOTES
Assessment/Plan:  Patient to continue present treatment  Patient instructed to follow a low-fat, low-salt and a low sugar/carbohydrate diet and get regular aerobic exercise as tolerated  Follow up with specialists as scheduled and return to the office in 6 months  Diagnoses and all orders for this visit:    Type 2 diabetes mellitus with stage 4 chronic kidney disease, with long-term current use of insulin ([facility] )    Type 2 diabetes mellitus with diabetic neuropathy, without long-term current use of insulin (HCC)    Primary hypertension    Malignant neoplasm of sigmoid colon (HCC)    NICOLASA (obstructive sleep apnea)    Chronic diastolic (congestive) heart failure (HCC)    PAF (paroxysmal atrial fibrillation) ([facility] )    Stage 4 chronic kidney disease ([facility] )    Other hyperlipidemia          Subjective:      Patient ID: Duke Sommers is a 74 y o  male  Patient is here for follow-up appoint for chronic conditions and we reviewed recent labs  Patient follows with multiple specialists regularly  Patient has been feeling fairly well overall  Diabetes  He presents for his follow-up diabetic visit  He has type 2 diabetes mellitus  His disease course has been stable  There are no hypoglycemic associated symptoms  Pertinent negatives for hypoglycemia include no headaches  Associated symptoms include fatigue, foot paresthesias, foot ulcerations and polyuria  Pertinent negatives for diabetes include no blurred vision, no chest pain, no polydipsia, no visual change, no weakness and no weight loss  There are no hypoglycemic complications  Symptoms are stable  Diabetic complications include heart disease, nephropathy, peripheral neuropathy and PVD  Pertinent negatives for diabetic complications include no CVA or retinopathy  Risk factors for coronary artery disease include dyslipidemia, diabetes mellitus, hypertension, male sex, obesity and tobacco exposure  Current diabetic treatment includes insulin injections   He is compliant with treatment all of the time  His weight is stable  He is following a generally healthy diet  He rarely participates in exercise  There is no change in his home blood glucose trend  His breakfast blood glucose is taken between 6-7 am  His breakfast blood glucose range is generally  mg/dl  His bedtime blood glucose is taken between 9-10 pm  His bedtime blood glucose range is generally 130-140 mg/dl  An ACE inhibitor/angiotensin II receptor blocker is not being taken  He sees a podiatrist Eye exam is current  Hypertension  This is a chronic problem  The problem is controlled  Associated symptoms include peripheral edema and shortness of breath  Pertinent negatives include no anxiety, blurred vision, chest pain, headaches, orthopnea, palpitations or PND  Past treatments include beta blockers, calcium channel blockers and diuretics  The current treatment provides significant improvement  Compliance problems include exercise  Hypertensive end-organ damage includes kidney disease, heart failure and PVD  There is no history of CVA or retinopathy  The following portions of the patient's history were reviewed and updated as appropriate: allergies, current medications, past family history, past medical history, past social history, past surgical history and problem list     Review of Systems   Constitutional: Positive for fatigue  Negative for weight loss  Eyes: Negative for blurred vision  Respiratory: Positive for shortness of breath  Cardiovascular: Negative for chest pain, palpitations, orthopnea and PND  Endocrine: Positive for polyuria  Negative for polydipsia  Neurological: Negative for weakness and headaches           Objective:      /68 (BP Location: Left arm, Patient Position: Sitting, Cuff Size: Standard)   Pulse 64   Temp 97 7 °F (36 5 °C) (Skin)   Resp 16   Ht 5' 10" (1 778 m)   Wt 93 9 kg (207 lb)   SpO2 94%   BMI 29 70 kg/m²          Physical Exam  Constitutional:       General: He is not in acute distress  Appearance: Normal appearance  HENT:      Head: Normocephalic  Mouth/Throat:      Mouth: Mucous membranes are moist    Eyes:      General: No scleral icterus  Conjunctiva/sclera: Conjunctivae normal    Neck:      Vascular: No carotid bruit  Cardiovascular:      Rate and Rhythm: Normal rate and regular rhythm  Heart sounds: Murmur heard  Pulmonary:      Effort: Pulmonary effort is normal       Breath sounds: Normal breath sounds  Abdominal:      Palpations: Abdomen is soft  Tenderness: There is no abdominal tenderness  Musculoskeletal:      Cervical back: Neck supple  Right lower leg: Edema present  Left lower leg: Edema present  Lymphadenopathy:      Cervical: No cervical adenopathy  Skin:     General: Skin is warm and dry  Neurological:      General: No focal deficit present  Mental Status: He is alert and oriented to person, place, and time  Psychiatric:         Mood and Affect: Mood normal          Behavior: Behavior normal          Thought Content: Thought content normal          Judgment: Judgment normal          BMI Counseling: Body mass index is 29 7 kg/m²  The BMI is above normal  Nutrition recommendations include decreasing overall calorie intake, 3-5 servings of fruits/vegetables daily, reducing fast food intake, consuming healthier snacks, decreasing soda and/or juice intake, moderation in carbohydrate intake, increasing intake of lean protein, reducing intake of saturated fat and trans fat and reducing intake of cholesterol  Exercise recommendations include moderate aerobic physical activity for 150 minutes/week

## 2022-04-01 ENCOUNTER — TELEPHONE (OUTPATIENT)
Dept: ADMINISTRATIVE | Facility: OTHER | Age: 67
End: 2022-04-01

## 2022-04-01 ENCOUNTER — APPOINTMENT (OUTPATIENT)
Dept: LAB | Facility: CLINIC | Age: 67
End: 2022-04-01
Payer: MEDICARE

## 2022-04-01 DIAGNOSIS — D70.1 CHEMOTHERAPY-INDUCED NEUTROPENIA (HCC): ICD-10-CM

## 2022-04-01 DIAGNOSIS — T45.1X5A CHEMOTHERAPY-INDUCED NEUTROPENIA (HCC): ICD-10-CM

## 2022-04-01 DIAGNOSIS — C18.7 MALIGNANT NEOPLASM OF SIGMOID COLON (HCC): ICD-10-CM

## 2022-04-01 LAB
ALBUMIN SERPL BCP-MCNC: 3.5 G/DL (ref 3.5–5)
ALP SERPL-CCNC: 89 U/L (ref 46–116)
ALT SERPL W P-5'-P-CCNC: 28 U/L (ref 12–78)
ANION GAP SERPL CALCULATED.3IONS-SCNC: 8 MMOL/L (ref 4–13)
AST SERPL W P-5'-P-CCNC: 16 U/L (ref 5–45)
BASOPHILS # BLD AUTO: 0.04 THOUSANDS/ΜL (ref 0–0.1)
BASOPHILS NFR BLD AUTO: 0 % (ref 0–1)
BILIRUB SERPL-MCNC: 1.28 MG/DL (ref 0.2–1)
BUN SERPL-MCNC: 100 MG/DL (ref 5–25)
CALCIUM SERPL-MCNC: 9.4 MG/DL (ref 8.3–10.1)
CHLORIDE SERPL-SCNC: 102 MMOL/L (ref 100–108)
CO2 SERPL-SCNC: 26 MMOL/L (ref 21–32)
CREAT SERPL-MCNC: 3.92 MG/DL (ref 0.6–1.3)
EOSINOPHIL # BLD AUTO: 0.23 THOUSAND/ΜL (ref 0–0.61)
EOSINOPHIL NFR BLD AUTO: 2 % (ref 0–6)
ERYTHROCYTE [DISTWIDTH] IN BLOOD BY AUTOMATED COUNT: 16.7 % (ref 11.6–15.1)
GFR SERPL CREATININE-BSD FRML MDRD: 14 ML/MIN/1.73SQ M
GLUCOSE P FAST SERPL-MCNC: 133 MG/DL (ref 65–99)
HCT VFR BLD AUTO: 31.3 % (ref 36.5–49.3)
HGB BLD-MCNC: 10.7 G/DL (ref 12–17)
IMM GRANULOCYTES # BLD AUTO: 0.09 THOUSAND/UL (ref 0–0.2)
IMM GRANULOCYTES NFR BLD AUTO: 1 % (ref 0–2)
LYMPHOCYTES # BLD AUTO: 1.04 THOUSANDS/ΜL (ref 0.6–4.47)
LYMPHOCYTES NFR BLD AUTO: 8 % (ref 14–44)
MCH RBC QN AUTO: 31.9 PG (ref 26.8–34.3)
MCHC RBC AUTO-ENTMCNC: 34.2 G/DL (ref 31.4–37.4)
MCV RBC AUTO: 93 FL (ref 82–98)
MONOCYTES # BLD AUTO: 1.16 THOUSAND/ΜL (ref 0.17–1.22)
MONOCYTES NFR BLD AUTO: 9 % (ref 4–12)
NEUTROPHILS # BLD AUTO: 9.86 THOUSANDS/ΜL (ref 1.85–7.62)
NEUTS SEG NFR BLD AUTO: 80 % (ref 43–75)
NRBC BLD AUTO-RTO: 0 /100 WBCS
PLATELET # BLD AUTO: 216 THOUSANDS/UL (ref 149–390)
PMV BLD AUTO: 10.3 FL (ref 8.9–12.7)
POTASSIUM SERPL-SCNC: 3.5 MMOL/L (ref 3.5–5.3)
PROT SERPL-MCNC: 6.9 G/DL (ref 6.4–8.2)
RBC # BLD AUTO: 3.35 MILLION/UL (ref 3.88–5.62)
SODIUM SERPL-SCNC: 136 MMOL/L (ref 136–145)
WBC # BLD AUTO: 12.42 THOUSAND/UL (ref 4.31–10.16)

## 2022-04-01 PROCEDURE — 80053 COMPREHEN METABOLIC PANEL: CPT

## 2022-04-01 PROCEDURE — 85025 COMPLETE CBC W/AUTO DIFF WBC: CPT

## 2022-04-01 PROCEDURE — 36415 COLL VENOUS BLD VENIPUNCTURE: CPT

## 2022-04-01 NOTE — LETTER
Diabetic Foot Exam Form    Date Requested: 22  Patient: Concepcion Jason  Patient : 1955   Referring Provider: Shweta Whitakre DO    Diabetic Foot Exam Performed with shoes and socks removed        Yes         No     Date of Diabetic Foot Exam ______________________________  Risk Score ____________________________________________    Left Foot       Visual Inspection         Monofilament Testing Sensory Exam        Pedal Pulses         Additional Comments         Right Foot      Visual Inspection         Monofilament Testing Sensory Exam       Pedal Pulses         Additional Comments         Comments DOS: 2022    Practice Providing Exam ______________________________________________    Exam Performed By (print name) _______________________________________      Provider Signature ___________________________________________________      These reports are needed for  compliance  Please fax this completed form and a copy of the Diabetic Foot Exam report to our office located at [place] [address] as soon as possible via [phone] clif Ulrich: Phone [phone]    We thank you for your assistance in treating our mutual patient

## 2022-04-01 NOTE — TELEPHONE ENCOUNTER
Upon review of the In Basket request and the patient's chart, initial outreach has been made via fax, please see Contacts section for details       Thank you  Yg Cui

## 2022-04-01 NOTE — LETTER
Diabetic Foot Exam Form    Date Requested: 22  Patient: Yana Yepez  Patient : 1955   Referring Provider: Consuelo Ko DO    Diabetic Foot Exam Performed with shoes and socks removed        Yes         No     Date of Diabetic Foot Exam ______________________________  Risk Score ____________________________________________    Left Foot       Visual Inspection         Monofilament Testing Sensory Exam        Pedal Pulses         Additional Comments         Right Foot      Visual Inspection         Monofilament Testing Sensory Exam       Pedal Pulses         Additional Comments         Comments 2022    Practice Providing Exam ______________________________________________    Exam Performed By (print name) _______________________________________      Provider Signature ___________________________________________________      These reports are needed for  compliance  Please fax this completed form and a copy of the Diabetic Foot Exam report to our office located at [place] [address] as soon as possible via [phone] attention Oneta Race: Phone [phone]    We thank you for your assistance in treating our mutual patient

## 2022-04-01 NOTE — TELEPHONE ENCOUNTER
----- Message from [facility] sent at 3/31/2022  9:03 AM EDT -----  Regarding: care gap request - Diabetic foot exam  03/31/22 9:03 AM    Hello, our patient attached above has had Diabetic Foot Exam completed/performed  Please assist in updating the patient chart by making an External outreach to [facility] located on [facility] in [facility]  The date of service is 2/25/2022      Thank you,  JANELLE Angel [address] FP

## 2022-04-04 ENCOUNTER — HOSPITAL ENCOUNTER (OUTPATIENT)
Dept: INFUSION CENTER | Facility: HOSPITAL | Age: 67
Discharge: HOME | End: 2022-04-04
Attending: INTERNAL MEDICINE
Payer: MEDICARE

## 2022-04-04 VITALS
BODY MASS INDEX: 29.76 KG/M2 | WEIGHT: 207.89 LBS | DIASTOLIC BLOOD PRESSURE: 68 MMHG | TEMPERATURE: 96.3 F | RESPIRATION RATE: 16 BRPM | SYSTOLIC BLOOD PRESSURE: 140 MMHG | OXYGEN SATURATION: 92 % | HEART RATE: 85 BPM | HEIGHT: 70 IN

## 2022-04-04 DIAGNOSIS — C18.7 MALIGNANT NEOPLASM OF SIGMOID COLON (HCC): Primary | ICD-10-CM

## 2022-04-04 DIAGNOSIS — T45.1X5A CHEMOTHERAPY-INDUCED NEUTROPENIA (HCC): ICD-10-CM

## 2022-04-04 DIAGNOSIS — D70.1 CHEMOTHERAPY-INDUCED NEUTROPENIA (HCC): ICD-10-CM

## 2022-04-04 PROCEDURE — 96409 CHEMO IV PUSH SNGL DRUG: CPT

## 2022-04-04 PROCEDURE — 96366 THER/PROPH/DIAG IV INF ADDON: CPT

## 2022-04-04 PROCEDURE — 96367 TX/PROPH/DG ADDL SEQ IV INF: CPT

## 2022-04-04 RX ORDER — DEXTROSE MONOHYDRATE 50 MG/ML
20 INJECTION, SOLUTION INTRAVENOUS ONCE
Status: DISCONTINUED | OUTPATIENT
Start: 2022-04-04 | End: 2022-04-08 | Stop reason: HOSPADM

## 2022-04-04 RX ORDER — FLUOROURACIL 50 MG/ML
500 INJECTION, SOLUTION INTRAVENOUS ONCE
Status: COMPLETED | OUTPATIENT
Start: 2022-04-04 | End: 2022-04-04

## 2022-04-04 RX ORDER — SODIUM CHLORIDE 9 MG/ML
20 INJECTION, SOLUTION INTRAVENOUS ONCE AS NEEDED
Status: DISCONTINUED | OUTPATIENT
Start: 2022-04-04 | End: 2022-04-08 | Stop reason: HOSPADM

## 2022-04-04 RX ADMIN — SODIUM CHLORIDE 20 ML/HR: 0.9 INJECTION, SOLUTION INTRAVENOUS at 12:12

## 2022-04-04 RX ADMIN — LEUCOVORIN CALCIUM 500 MG: 500 INJECTION, POWDER, LYOPHILIZED, FOR SOLUTION INTRAMUSCULAR; INTRAVENOUS at 12:58

## 2022-04-04 RX ADMIN — FLUOROURACIL 500 MG: 50 INJECTION, SOLUTION INTRAVENOUS at 15:08

## 2022-04-04 RX ADMIN — DEXAMETHASONE SODIUM PHOSPHATE: 10 INJECTION, SOLUTION INTRAMUSCULAR; INTRAVENOUS at 12:25

## 2022-04-07 ENCOUNTER — OFFICE VISIT (OUTPATIENT)
Dept: NEPHROLOGY | Facility: CLINIC | Age: 67
End: 2022-04-07
Payer: MEDICARE

## 2022-04-07 VITALS
HEART RATE: 83 BPM | HEIGHT: 70 IN | OXYGEN SATURATION: 92 % | BODY MASS INDEX: 30.35 KG/M2 | WEIGHT: 212 LBS | SYSTOLIC BLOOD PRESSURE: 136 MMHG | DIASTOLIC BLOOD PRESSURE: 64 MMHG

## 2022-04-07 DIAGNOSIS — N18.4 CKD (CHRONIC KIDNEY DISEASE), STAGE IV (HCC): ICD-10-CM

## 2022-04-07 PROCEDURE — 99214 OFFICE O/P EST MOD 30 MIN: CPT | Performed by: INTERNAL MEDICINE

## 2022-04-07 RX ORDER — METOLAZONE 10 MG/1
10 TABLET ORAL WEEKLY
Qty: 20 TABLET | Refills: 3 | Status: SHIPPED | OUTPATIENT
Start: 2022-04-07 | End: 2022-04-27 | Stop reason: HOSPADM

## 2022-04-07 RX ORDER — TORSEMIDE 20 MG/1
40 TABLET ORAL 2 TIMES DAILY
Qty: 270 TABLET | Refills: 3 | Status: SHIPPED | OUTPATIENT
Start: 2022-04-07 | End: 2022-06-22 | Stop reason: SDUPTHER

## 2022-04-07 NOTE — PATIENT INSTRUCTIONS
Chronic Kidney Disease   WHAT YOU NEED TO KNOW:   Chronic kidney disease (CKD) is the gradual and permanent loss of kidney function  It is also called chronic kidney failure, or chronic renal insufficiency  Normally, the kidneys remove fluid, chemicals, and waste from your blood  These wastes are turned into urine by your kidneys  CKD may worsen over time and lead to kidney failure  Your CKD team will help you and your family plan for your care at home  The team will help you create goals and find ways to meet your goals  Your care plan may change over time as your needs change  DISCHARGE INSTRUCTIONS:   Call your local emergency number (911 in the [address]) if:   · You have a seizure  · You have shortness of breath  Return to the emergency department if:   · You are confused and very drowsy  Call your doctor or nephrologist if:   · You suddenly gain or lose more weight than your healthcare provider has told you is okay  · You have itchy skin or a rash  · You urinate more or less than you normally do  · You have blood in your urine  · You have nausea and are vomiting  · You have fatigue or muscle weakness  · You have hiccups that will not stop  · You have questions or concerns about your condition or care  Medicines:   · Medicines  may be given to decrease blood pressure and get rid of extra fluid  You may also receive medicine to manage health conditions that may occur with CKD, such as anemia, diabetes, and heart disease  · Take your medicine as directed  Contact your healthcare provider if you think your medicine is not helping or if you have side effects  Tell him or her if you are allergic to any medicine  Keep a list of the medicines, vitamins, and herbs you take  Include the amounts, and when and why you take them  Bring the list or the pill bottles to follow-up visits  Carry your medicine list with you in case of an emergency      What you can do to manage CKD: Management may include making some lifestyle changes  Tell your healthcare provider if you have any concerns about being able to make changes  He or she can help you find solutions, including working with specialists  Ask for help creating a plan to break large goals into smaller steps  Your plan may include any of the following:  · Manage other health conditions  Your healthcare provider will work with you to make a care plan that meets your needs  You will be checked regularly for heart disease or other conditions that can make CKD worse, such as diabetes  Your blood pressure will be closely monitored  You will also get a target blood pressure and help making a plan to reach your target  This may include taking your blood pressure at home  · Maintain a healthy weight  Your weight and body mass index (BMI) will be checked regularly  BMI helps find if your weight is healthy for your height  Your healthcare provider will use other tests to check your muscle and protein levels  Extra weight can strain your kidneys  A low weight or low muscle mass can make you feel more tired  You may have trouble doing your daily activities  Ask your provider what a healthy weight is for you  He or she can help you create a plan to lose or gain weight safely, if needed  The plan may include keeping a food diary  This is a list of foods and liquids you have each day  Your provider will use the diary to help you make changes, if needed  Changes are based on your health and any other conditions you have, such as diabetes  · Create an exercise plan  Regular exercise can help you manage CKD, high blood pressure, and diabetes  Exercise also helps control weight  Your provider can help you create exercise goals and a plan to reach those goals  For example, your goal may be to exercise for 30 minutes in a day  Your plan can include breaking exercise into 10 minute sessions, 3 times during the day           · Create a healthy eating plan  Your provider may tell you to eat food low in potassium, phosphorus, or protein  Your provider may also recommend vitamin or mineral supplements  Do not take any supplements without talking to your provider  A dietitian can help you plan meals if needed  Ask how much liquid to drink each day and which liquids are best for you  · Limit sodium (salt) as directed  You may need to limit sodium to less than 2,300 milligrams (mg) each day  Ask your dietitian or healthcare provider how much sodium you can have each day  The amount depends on your stage of kidney disease  Table salt, canned foods, soups, salted snacks, and processed meats, like deli meats and sausage, are high in sodium  Your provider or a dietitian can show you how to read food labels for sodium  · Limit alcohol as directed  Alcohol can cause fluid retention and can affect your kidneys  Ask how much alcohol is safe for you  A drink of alcohol is 12 ounces of beer, 5 ounces of wine, or 1½ ounces of liquor  · Do not smoke  Nicotine and other chemicals in cigarettes and cigars can cause kidney damage  Ask your provider for information if you currently smoke and need help to quit  E-cigarettes or smokeless tobacco still contain nicotine  Talk to your provider before you use these products  · Ask about over-the-counter medicines  Medicines such as NSAIDs and laxatives may harm your kidneys  Some cough and cold medicines can raise your blood pressure  Always ask if a medicine is safe before you take it  · Ask about vaccines you may need  CKD can increase your risk for infections such as pneumonia, influenza, and hepatitis  Vaccines lower your risk for infection  Your healthcare provider will tell you which vaccines you need and when to get them  Follow up with your doctor or nephrologist as directed: You will need to return for tests to monitor your kidney and nerve function, and your parathyroid hormone level   Your medicines may be changed, based on certain test results  Write down your questions so you remember to ask them during your visits  © Copyright [employer] 2022 Information is for End User's use only and may not be sold, redistributed or otherwise used for commercial purposes  All illustrations and images included in CareNotes® are the copyrighted property of [employer]  or Ross Bund[employer]  The above information is an  only  It is not intended as medical advice for individual conditions or treatments  Talk to your doctor, nurse or pharmacist before following any medical regimen to see if it is safe and effective for you

## 2022-04-07 NOTE — PROGRESS NOTES
OFFICE follow-up- Nephrology   Brandon Rayray 74 y o  male MRN: [id number]    Encounter: [id number]          ASSESSMENT and PLAN:    He is 74years old with a past medical history of type 2 diabetes mellitus for over 20 years, hypertension, gout  hyperlipidemia proteinuria and diabetic foot ulcer presents for follow-up    Diagnoses and all orders for this visit:    Essential (primary) hypertension in the setting of stage 4 chronic kidney disease and diastolic heart failure  - blood pressures at home have been stable  - he is currently on amlodipine 10 mg daily, metoprolol 50 mg twice daily, losartan was discontinued for ELZBIETA, and Hyperkalemia, and torsemide increased to 40 mg in am and 20 mg in PM  -secondary workup included a repeat renal artery ultrasound which does show a left renal artery stenosis  - his echocardiogram was reviewed as well he should have further follow-up with cardiology to monitor his valvular heart disease- he does have LVH, continue to monitor  -vein mapping and access consult with vascular surgery     Persistent proteinuria/stage 4 chronic kidney disease  - in the setting of prolonged diabetes  -serologic workup in past has been negative  -his creatinine has now improved down to 2 6 this could be dilutional as well previous creatinines baseline were 3-3 6  -nephritic range proteinuria secondary to diabetic kidney disease likely not biopsy-proven however    Nephropathy-diabetic kidney disease  -serologies negative will monitor at this point    Gout  - has been gout free and his allopurinol was discontinued will monitor    Bilateral leg edema in the setting of CHF and CKD   -As above regarding medications  -seems his "dry weight" is around 190 lbs     -Conntinue torsemide 40 mg/20 mg daily as maintenance diuretic  -increase metolazone to 10 mg 1-2 x weekly to maintain weight btwn 190-200 lbs    Left renal artery stenosis and history of diastolic heart failure  -renal artery Doppler showed 60% stenosis  -can be indication and cause of flash pulmonary edema  -mitral valve disease also present  -diuresis as above    Left foot osteomyelitis, peripheral vascular disease  -status post amputation    Colon CA/Iron deficiency Anemia the setting of colon CA  -CKD IV   -IV iron infusions as needed  -chemotherapy now on reduced dose  -obtaining blood work weekly    Chronic metabolic acidosis  -sodium bicarbonate to 650 mg twice daily     Will continue blood work ordered  By oncology  Monitor volume status  Follow-up in 3 months      HPI:  Edgardo Arce is a 74 y o male who was referred by Jaya Brown for evaluation of  Initial evaluation    He is 74years old and has a history of type 2 diabetes mellitus currently on oral medications stage 4 chronic kidney disease    Unfortunately did require dialysis after severe volume depletion then volume overload after 1st chemotherapy treatment now on reduced dose and seems to be tolerating this better  Therapeutically being monitored by Oncology closely    He denies any acute chest pain or shortness of breath fevers chills nausea vomiting diarrhea constipation foamy or bloody urine    Always feeling tired, lower extremity edema weight is slightly up from his baseline    Allergies: Invokana [canagliflozin], Lamisil [terbinafine], Other, and Latex    Medications:   Current Outpatient Medications:     allopurinol (ZYLOPRIM) 300 mg tablet, Take 1 tablet (300 mg total) by mouth every morning, Disp: 90 tablet, Rfl: 0    amLODIPine (NORVASC) 10 mg tablet, Take 1 tablet (10 mg total) by mouth daily, Disp: 90 tablet, Rfl: 3    apixaban (ELIQUIS) 5 mg, Take 1 tablet (5 mg total) by mouth every 12 (twelve) hours, Disp: 180 tablet, Rfl: 3    Dupilumab (Dupixent) 300 MG/2ML SOPN, Inject 300 mg under the skin Once every 2 weeks, Disp: , Rfl:     famotidine (PEPCID) 40 MG tablet, TAKE 1 TABLET BY MOUTH EVERY DAY, Disp: 90 tablet, Rfl: 3    Insulin Degludec-Liraglutide (Xultophy) 100 units-3 6 mg/mL injection pen, Inject 19 units daily  , Disp: 18 mL, Rfl: 1    Insulin Pen Needle (BD Pen Needle Zenaida U/F) 32G X 4 MM MISC, Use 1 daily, Disp: 100 each, Rfl: 2    metolazone (ZAROXOLYN) 10 mg tablet, Take 1 tablet (10 mg total) by mouth once a week, Disp: 20 tablet, Rfl: 3    metoprolol tartrate (LOPRESSOR) 50 mg tablet, Take 1 tablet (50 mg total) by mouth every 12 (twelve) hours, Disp: 180 tablet, Rfl: 3    Multiple Vitamin (MULTIVITAMIN) tablet, Take 1 tablet by mouth daily IN AM, Disp: , Rfl:     omega-3-acid ethyl esters (LOVAZA) 1 g capsule, Take 2 capsules (2 g total) by mouth 2 (two) times a day Mail print prescription to pt, Disp: 360 capsule, Rfl: 3    ondansetron (ZOFRAN) 8 mg tablet, Take 1 tablet (8 mg total) by mouth every 8 (eight) hours as needed for nausea or vomiting, Disp: 20 tablet, Rfl: 3    OneTouch Ultra test strip, USE TO TEST BLOOD SUGAR 3 TIMES A DAY, Disp: 100 each, Rfl: 3    simvastatin (ZOCOR) 20 mg tablet, Take 1 tablet (20 mg total) by mouth daily at bedtime, Disp: 90 tablet, Rfl: 3    sodium bicarbonate 650 mg tablet, Take 1 tablet (650 mg total) by mouth 2 (two) times daily after meals, Disp: 180 tablet, Rfl: 0    tamsulosin (FLOMAX) 0 4 mg, TAKE 1 CAPSULE BY MOUTH EVERY DAY WITH DINNER, Disp: 90 capsule, Rfl: 3    torsemide (DEMADEX) 20 mg tablet, Take 2 tablets (40 mg total) by mouth 2 (two) times a day 40mg am and 20mg pm, Disp: 270 tablet, Rfl: 3  No current facility-administered medications for this visit      Facility-Administered Medications Ordered in Other Visits:     dextrose 5 % infusion, 20 mL/hr, Intravenous, Once, Pau Tadeo MD    sodium chloride 0 9 % infusion, 20 mL/hr, Intravenous, Once PRN, Pau Tadeo MD, Stopped at 04/04/22 1520    Past Medical History:   Diagnosis Date    Anemia     iron def    Arthritis     OA    Bruise of both arms     forearms and both hands    Bruises easily     [facility] 09/01/2021    colon    [facility]     Chronic kidney disease     CPAP (continuous positive airway pressure) dependence     [facility]     Diabetic foot ulcer [facility]     Eczema     Erectile dysfunction     Gout     Heart murmur     History of permanent cardiac pacemaker placement 11/2018    Hyperlipidemia     Hypertension     Hypoglycemia 3/8/2019    Murmur     Nephropathy     Osteoarthritis     Pacemaker 11/06/2018    [facility]peripheral artery disease) [facility]     Seasonal allergies     Sleep apnea     Toe infection     bilat great toes    Vertigo     Walks frequently     Wears dentures     upper    Wears glasses      Past Surgical History:   Procedure Laterality Date    CARDIAC PACEMAKER PLACEMENT      CARPAL TUNNEL RELEASE Left     CARPAL TUNNEL RELEASE Right     CARPAL TUNNEL RELEASE      COLONOSCOPY  08/2020    COLONOSCOPY      FL GUIDED CENTRAL VENOUS ACCESS DEVICE INSERTION  1/11/2022    FOOT AMPUTATION Left 2/10/2020    Procedure: PARTIAL 1ST RAY AMPUTATION;  Surgeon: Mukesh Javed DPM;  [facility];  Service: Podiatry    INCISION AND DRAINAGE OF WOUND Left 1/12/2020    Procedure: INCISION AND DRAINAGE (I&D) EXTREMITY AND REMOVAL OF SESMOID BONE;  Surgeon: Vasquez Simon DPM;  [facility];  Service: Podiatry    IR OTHER  1/21/2022    IR PICC REPOSITION  1/14/2020    KNEE ARTHROSCOPY Left     KNEE ARTHROSCOPY Right     KNEE SURGERY      LA AMPUTATION TOE,I-P JT Bilateral 5/2/2017    Procedure: PARTIAL AMPUTATION RIGHT AND LEFT HALLUX ;  Surgeon: Mukesh Javed DPM;  [facility];  Service: Podiatry    LA AMPUTATION TOE,MT-P JT Left 7/25/2017    Procedure: 2ND TOE AMPUTATION;  Surgeon: Mukesh Javed DPM;  [facility];  Service: Podiatry    LA INSJ TUNNELED CTR VAD W/SUBQ PORT AGE 5 YR/> N/A 1/11/2022    Procedure: INSERTION VENOUS PORT ( PORT-A-CATH) IR;  Surgeon: Aurea James DO;  [facility];  Service: Interventional Radiology    RESECTION LOW ANTERIOR LAPAROSCOPIC N/A 10/21/2021    Procedure: RESECTION LOW ANTERIOR LAPAROSCOPIC;  Surgeon: Vianney Fu MD;  [facility];  Service: Colorectal    SHOULDER ARTHROSCOPY Left     with screws,RTC    SHOULDER SURGERY      TOE AMPUTATION Left 1/8/2020    Procedure: Sean Herberth;  Surgeon: Pastor Lili DPM;  [facility];  Service: Podiatry    UPPER GASTROINTESTINAL ENDOSCOPY  03/2020    Intestinal metaplasia prepyloric    VASECTOMY       Family History   Problem Relation Age of Onset    Heart disease Father         passed away    Hypertension Father     Pulmonary embolism Father     Hypertension Mother         assed away      reports that he quit smoking about 12 years ago  His smoking use included cigarettes  He started smoking about 44 years ago  He has a 10 00 pack-year smoking history  He has never used smokeless tobacco  He reports that he does not drink alcohol and does not use drugs  Physical Exam:   Vitals:    04/07/22 0915   BP: 136/64   Pulse: 83   SpO2: 92%   Weight: 96 2 kg (212 lb)   Height: 5' 10" (1 778 m)     Body mass index is 30 42 kg/m²      General: conscious, cooperative, in no acute distress  Eyes: conjunctivae pink, anicteric sclerae  ENT: lips and mucous membranes moist  Neck: supple, no JVD  Chest: clear breath sounds bilateral, no crackles, ronchus or wheezings  CVS: normal rate, regular rhythm  Abdomen: soft, non-tender, non-distended, normoactive bowel sounds  Extremities:  3+ edema  Skin: no rash  Neuro: awake, alert, oriented  Psych:  Pleasant affect      Lab Results:     Results for orders placed or performed in visit on 04/01/22   CBC and differential   Result Value Ref Range    WBC 12 42 (H) 4 31 - 10 16 Thousand/uL    RBC 3 35 (L) 3 88 - 5 62 Million/uL    Hemoglobin 10 7 (L) 12 0 - 17 0 g/dL    Hematocrit 31 3 (L) 36 5 - 49 3 %    MCV 93 82 - 98 fL    MCH 31 9 26 8 - 34 3 pg    MCHC 34 2 31 4 - 37 4 g/dL    RDW 16  7 (H) 11 6 - 15 1 %    MPV 10 3 8 9 - 12 7 fL    Platelets 390 006 - 117 Thousands/uL    nRBC 0 /100 WBCs    Neutrophils Relative 80 (H) 43 - 75 %    Immat GRANS % 1 0 - 2 %    Lymphocytes Relative 8 (L) 14 - 44 %    Monocytes Relative 9 4 - 12 %    Eosinophils Relative 2 0 - 6 %    Basophils Relative 0 0 - 1 %    Neutrophils Absolute 9 86 (H) 1 85 - 7 62 Thousands/µL    Immature Grans Absolute 0 09 0 00 - 0 20 Thousand/uL    Lymphocytes Absolute 1 04 0 60 - 4 47 Thousands/µL    Monocytes Absolute 1 16 0 17 - 1 22 Thousand/µL    Eosinophils Absolute 0 23 0 00 - 0 61 Thousand/µL    Basophils Absolute 0 04 0 00 - 0 10 Thousands/µL   Comprehensive metabolic panel   Result Value Ref Range    Sodium 136 136 - 145 mmol/L    Potassium 3 5 3 5 - 5 3 mmol/L    Chloride 102 100 - 108 mmol/L    CO2 26 21 - 32 mmol/L    ANION GAP 8 4 - 13 mmol/L     (H) 5 - 25 mg/dL    Creatinine 3 92 (H) 0 60 - 1 30 mg/dL    Glucose, Fasting 133 (H) 65 - 99 mg/dL    Calcium 9 4 8 3 - 10 1 mg/dL    AST 16 5 - 45 U/L    ALT 28 12 - 78 U/L    Alkaline Phosphatase 89 46 - 116 U/L    Total Protein 6 9 6 4 - 8 2 g/dL    Albumin 3 5 3 5 - 5 0 g/dL    Total Bilirubin 1 28 (H) 0 20 - 1 00 mg/dL    eGFR 14 ml/min/1 73sq m     *Note: Due to a large number of results and/or encounters for the requested time period, some results have not been displayed  A complete set of results can be found in Results Review  Echocardiogram 8/9/15  - left ventricle had an EF of 60% wall thickness was mildly increased with mild concentric hypertrophy and an abnormal left ventricular relaxation  - left atrium was mildly to moderately dilated  - right atrium was mildly dilated  - mitral valve shows mild annular calcification    Renal artery ultrasound 6/27/19  RIGHT RENAL:  No evidence of significant arterial occlusive disease in the main renal artery  Pulsatile renal vein  Renovascular resistive index of 0 8, indicative of parenchymal disease    Renal/Aorta Ratio: 1 81  The Kidney measures 13 cm  LEFT RENAL:  There is evidence of a >60% stenosis in the ostium and mid renal artery  Pulsatile renal vein  Renovascular resistive index of 0 9, indicative of parenchymal disease  Renal/Aorta Ratio: 2 4  The Kidney measures 13 9 cm  MESENTERIC:  There is evidence of a >70% stenosis in the celiac artery  Superior mesenteric artery is patent  Portions of the record may have been created with voice recognition software  Occasional wrong word or "sound a like" substitutions may have occurred due to the inherent limitations of voice recognition software  Read the chart carefully and recognize, using context, where substitutions have occurred  If you have any questions, please contact the dictating provider

## 2022-04-08 NOTE — TELEPHONE ENCOUNTER
As a follow-up, a second attempt has been made for outreach via fax, please see Contacts section for details      Thank you  Odilia Kidd

## 2022-04-11 NOTE — TELEPHONE ENCOUNTER
Upon review of the In Basket request we were able to locate, review, and update the patient chart as requested for Diabetic Foot Exam     Any additional questions or concerns should be emailed to the Practice Liaisons via [email or web address] email, please do not reply via In Basket      Thank you  Frieda Fowler

## 2022-04-12 ENCOUNTER — TELEPHONE (OUTPATIENT)
Dept: VASCULAR SURGERY | Facility: CLINIC | Age: 67
End: 2022-04-12

## 2022-04-12 ENCOUNTER — CONSULT (OUTPATIENT)
Dept: VASCULAR SURGERY | Facility: CLINIC | Age: 67
End: 2022-04-12
Payer: MEDICARE

## 2022-04-12 VITALS
HEIGHT: 70 IN | BODY MASS INDEX: 29.58 KG/M2 | SYSTOLIC BLOOD PRESSURE: 144 MMHG | DIASTOLIC BLOOD PRESSURE: 58 MMHG | WEIGHT: 206.6 LBS

## 2022-04-12 DIAGNOSIS — R79.1 ABNORMAL COAGULATION PROFILE: ICD-10-CM

## 2022-04-12 DIAGNOSIS — M86.9 OSTEOMYELITIS OF LEFT FOOT, UNSPECIFIED TYPE (HCC): ICD-10-CM

## 2022-04-12 DIAGNOSIS — N18.32 STAGE 3B CHRONIC KIDNEY DISEASE (HCC): Primary | ICD-10-CM

## 2022-04-12 DIAGNOSIS — E11.621 DIABETIC ULCER OF TOE OF LEFT FOOT ASSOCIATED WITH TYPE 2 DIABETES MELLITUS, UNSPECIFIED ULCER STAGE (HCC): ICD-10-CM

## 2022-04-12 DIAGNOSIS — N18.4 STAGE 4 CHRONIC KIDNEY DISEASE (HCC): ICD-10-CM

## 2022-04-12 DIAGNOSIS — L97.529 DIABETIC ULCER OF TOE OF LEFT FOOT ASSOCIATED WITH TYPE 2 DIABETES MELLITUS, UNSPECIFIED ULCER STAGE (HCC): ICD-10-CM

## 2022-04-12 DIAGNOSIS — C18.7 MALIGNANT NEOPLASM OF SIGMOID COLON (HCC): ICD-10-CM

## 2022-04-12 PROCEDURE — 99204 OFFICE O/P NEW MOD 45 MIN: CPT | Performed by: SURGERY

## 2022-04-12 RX ORDER — CHLORHEXIDINE GLUCONATE 0.12 MG/ML
15 RINSE ORAL ONCE
Status: CANCELLED | OUTPATIENT
Start: 2022-04-12 | End: 2022-04-12

## 2022-04-12 RX ORDER — CEFAZOLIN SODIUM 2 G/50ML
2000 SOLUTION INTRAVENOUS ONCE
Status: CANCELLED | OUTPATIENT
Start: 2022-04-12 | End: 2022-04-12

## 2022-04-12 NOTE — PATIENT INSTRUCTIONS
Colon cancer [facility])  Colon cancer with concern for abdominal carcinomatosis on palliative chemotherapy  Follows with Dr Aleena Vital  Receives treatment for 5 weeks at a time and 1 week off  Has a break in May between 5/16 and 5/30 for possible surgery  Will send a message to his oncologist regarding proceeding with AVF creation during that period of time      Diabetic ulcer of toe of left foot associated with type 2 diabetes mellitus ([address] )  Left foot diabetic wound s/p skin graft, follows with [facility]  Had nearly healed but opened up again with starting chemotherapy  Follows with the regularly  The foot is wrapped in their specific wound care regimen which I am not able to find through Epic  Continue local wound care per podiatry  No concern for infection per patient            Lab Results   Component Value Date     HGBA1C 6 0 (H) 02/18/2022         Stage 4 chronic kidney disease (HCC)        Lab Results   Component Value Date     EGFR 14 04/01/2022     EGFR 16 03/25/2022     EGFR 18 03/18/2022     CREATININE 3 92 (H) 04/01/2022     CREATININE 3 49 (H) 03/25/2022     CREATININE 3 26 (H) 03/18/2022      CKD stage 4, not yet on dialysis  Unclear when may require dialysis but referred for new access creation  Had previously required dialysis session x 2 through a temporary dialysis catheter when hospitalized with ELZBIETA/CHF and is now off dialysis  He is right handed  Has never had other dialysis access  Denies upper extremity swelling or paresthesias  Negative Lars's test left hand  Palpable radial/ulnar pulses bilaterally      Vein mapping reviewed - adequate caliber distal radial artery 2 9mm left, adequate cephalic vein throughout the length of the left arm      -Discussed options for dialysis access, including arteriovenous fistula vs  Graft creation  Typically recommend proceeding in the nondominant arm first for dialysis access creation   His vein mapping suggests he would be a candidate for a left radiocephalic AVF creation  He is not yet on dialysis and would not plan to place an AVG if veins are not suitable until he is closer to needing dialysis  -Discussed all risks and benefits of the procedure including bleeding, infection, injury to surrounding structures, wound healing issues, steal syndrome, arm swelling, need for revision/further intervention to assist with maturity, fistula thrombosis  He is at increased risk for poor healing, wound breakdown, infection due to simultaneous chemotherapy  He is understanding of these risks and agreeable to proceed  Informed consent was obtained   -Will try to avoid general anesthesia in setting of significant cardiac risk factors  Recommend regional anesthesia with sedation   Will obtain pre-op risk stratification given significant cardiac risk factors and clearance to hold eliquis for surgery

## 2022-04-12 NOTE — ASSESSMENT & PLAN NOTE
Left foot diabetic wound s/p skin graft, follows with [facility]  Had nearly healed but opened up again with starting chemotherapy  Follows with the regularly  The foot is wrapped in their specific wound care regimen which I am not able to find through Epic  Continue local wound care per podiatry  No concern for infection per patient      Lab Results   Component Value Date    HGBA1C 6 0 (H) 02/18/2022

## 2022-04-12 NOTE — PROGRESS NOTES
Assessment/Plan:    Colon cancer [facility])  Colon cancer with concern for abdominal carcinomatosis on palliative chemotherapy  Follows with Dr Mercedes Perez  Receives treatment for 5 weeks at a time and 1 week off  Has a break in May between 5/16 and 5/30 for possible surgery  Will send a message to his oncologist regarding proceeding with AVF creation during that period of time  Diabetic ulcer of toe of left foot associated with type 2 diabetes mellitus ([address] )  Left foot diabetic wound s/p skin graft, follows with [facility]  Had nearly healed but opened up again with starting chemotherapy  Follows with the regularly  The foot is wrapped in their specific wound care regimen which I am not able to find through Epic  Continue local wound care per podiatry  No concern for infection per patient  Lab Results   Component Value Date    HGBA1C 6 0 (H) 02/18/2022       Stage 4 chronic kidney disease (HCC)  Lab Results   Component Value Date    EGFR 14 04/01/2022    EGFR 16 03/25/2022    EGFR 18 03/18/2022    CREATININE 3 92 (H) 04/01/2022    CREATININE 3 49 (H) 03/25/2022    CREATININE 3 26 (H) 03/18/2022     CKD stage 4, not yet on dialysis  Unclear when may require dialysis but referred for new access creation  Had previously required dialysis session x 2 through a temporary dialysis catheter when hospitalized with ELZBIETA/CHF and is now off dialysis  He is right handed  Has never had other dialysis access  Denies upper extremity swelling or paresthesias  Negative Lars's test left hand  Palpable radial/ulnar pulses bilaterally  Vein mapping reviewed - adequate caliber distal radial artery 2 9mm left, adequate cephalic vein throughout the length of the left arm     -Discussed options for dialysis access, including arteriovenous fistula vs  Graft creation  Typically recommend proceeding in the nondominant arm first for dialysis access creation   His vein mapping suggests he would be a candidate for a left radiocephalic AVF creation  He is not yet on dialysis and would not plan to place an AVG if veins are not suitable until he is closer to needing dialysis  -Discussed all risks and benefits of the procedure including bleeding, infection, injury to surrounding structures, wound healing issues, steal syndrome, arm swelling, need for revision/further intervention to assist with maturity, fistula thrombosis  He is at increased risk for poor healing, wound breakdown, infection due to simultaneous chemotherapy  He is understanding of these risks and agreeable to proceed  Informed consent was obtained   -Will try to avoid general anesthesia in setting of significant cardiac risk factors  Recommend regional anesthesia with sedation  Will obtain pre-op risk stratification given significant cardiac risk factors and clearance to hold [facility] for surgery  Diagnoses and all orders for this visit:    Stage 3b chronic kidney disease ([facility] )  -     Ambulatory referral to Vascular Surgery  -     Case request operating room: CREATION FISTULA ARTERIOVENOUS (AV); Standing  -     Case request operating room: CREATION FISTULA ARTERIOVENOUS (AV)  -     Protime-INR; Future  -     APTT; Future  -     Ambulatory Referral to Cardiology; Future    Osteomyelitis of left foot, unspecified type ([facility] [address] )  -     Ambulatory referral to Vascular Surgery    Abnormal coagulation profile   -     Protime-INR; Future  -     APTT;  Future    Malignant neoplasm of sigmoid colon ([facility] [address] )    Diabetic ulcer of toe of left foot associated with type 2 diabetes mellitus, unspecified ulcer stage ([facility] [address] )    Stage 4 chronic kidney disease ([facility] [address] )        Operative Scheduling Information:    Hospital:  [place]    Physician:  Eddi Mccormick    Surgery: Left upper extremity arteriovenous fistula    Urgency:  Urgent: normally 2 weeks but on chemotherapy, ideally schedule during chemo break in May (break between May 17-May 29)    Level:  Level 2: Outpatients to be scheduled for surgery with time dependent medical necessity within 2 weeks    Case Length:  Normal    Post-op Bed:  Outpatient    OR Table:  Standard    Equipment Needs:  None    Medication Instructions:  Eliquis:  Hold for 2 days prior to procedure    Hydration:  No    I have spent 45 minutes with Patient  today in which greater than 50% of this time was spent in counseling/coordination of care regarding Intructions for management, Importance of tx compliance and Impressions  Subjective:      Patient ID: Rojelio Lantigua is a 74 y o  male  New patient referred by Manjula Richmond DO for chronic kidney disease  Pt had vein mapping done 1/17/2022 and a UEV on 1/26/22  Pt is dominant in his ---- hand  He has history of L shoulder surgery and bilateral carpal tunnel surgeries over 5 years ago  He denies any history of long term IV drug use  Pt is not currently on dialysis  Pt is taking Eliquis and Simvastatin  He is a former smoker  HPI  Mr  Jil Saleh is a 65yo male former smoker with HTN, HLD, diastolic CHF, COPD, NICOLASA, gout, left renal artery stenosis, SSS s/p pacemaker, Afib on eliquis, mild aortic stenosis, moderate mitral valve stenosis, sigmoid cancer s/p resection on palliative chemotherapy, CKD stage 4 who presents as a new referral for dialysis access creation  He did require in hospital temporary dialysis for 2 session when he was admitted for CHF exacerbation/ELZBIETA  He has not required additional dialysis since then  He has not had previous access creation  He denies upper extremity swelling or paresthesias/pain  He is currently undergoing chemotherapy for colon cancer with evidence of carcinomatosis on his recent PET scan and follows with oncology  He states he also has a chronic left foot wound which is not infected that nearly healed after skin grafting [facility] but re-opened when he started chemotherapy      The following portions of the patient's history were reviewed and updated as appropriate: allergies, current medications, past family history, past medical history, past social history, past surgical history and problem list     I have reviewed and made appropriate changes to the review of systems input by the medical assistant      Vitals:    04/12/22 0950 04/12/22 0951   BP: 136/52 144/58   BP Location: Left arm Right arm   Patient Position: Sitting Sitting   Cuff Size: Large Standard   Weight: 93 7 kg (206 lb 9 6 oz)    Height: 5' 10" (1 778 m)        Patient Active Problem List   Diagnosis    Bilateral leg edema    Diabetic ulcer of toe of left foot associated with type 2 diabetes mellitus (HCC)    Easy bruising    Eczema    Erectile dysfunction of non-organic origin    Gout    Hyperlipidemia    Uremic acidosis    Arthritis    Peripheral arterial disease ([facility])    PVCs (premature ventricular contractions)    Rhinitis    Systolic murmur    Vertigo    Complete heart block ([facility])    Metabolic acidosis    Acute kidney injury ([facility] )    Other hyperlipidemia    PAF (paroxysmal atrial fibrillation) ([facility])    Persistent proteinuria    Volume overload    Urinary retention    NICOLASA (obstructive sleep apnea)    Type 2 diabetes mellitus with chronic kidney disease, with long-term current use of insulin ([facility])    Hypertension    Stage 4 chronic kidney disease ([facility])    Nonrheumatic aortic valve stenosis    Anemia    Fever    Mitral valve stenosis    Abnormal CT of the chest    Hyperkalemia    Acute pulmonary edema ([facility])    Chronic diastolic (congestive) heart failure ([facility])    Left renal artery stenosis ([facility])    S/P amputation of lesser toe, left ([facility])    S/P amputation of lesser toe, right (HCC)    Elevated troponin I level    Staphylococcus aureus bacteremia    Type 2 diabetes mellitus with diabetic neuropathy, without long-term current use of insulin ([facility])    Hyponatremia    Iron deficiency anemia    Anxiety    Osteomyelitis of left foot ([address] )    Amputation of left great toe ([address] )    Multiple drug resistant organism (MDRO) culture positive    Renovascular hypertension    SSS (sick sinus syndrome) (HCC)    Chronic obstructive pulmonary disease (HCC)    Absence of toe (HCC)    Chronic painful diabetic neuropathy (HCC)    Onychomycosis    Colon cancer (HCC)    Acute kidney injury superimposed on chronic kidney disease ([facility] )    RSV (respiratory syncytial virus pneumonia)    Chemotherapy-induced neutropenia (HCC)    Chemotherapy-induced nausea    Acute on chronic diastolic congestive heart failure (HCC)       Past Surgical History:   Procedure Laterality Date    CARDIAC PACEMAKER PLACEMENT      CARPAL TUNNEL RELEASE Left     CARPAL TUNNEL RELEASE Right     CARPAL TUNNEL RELEASE      COLONOSCOPY  08/2020    COLONOSCOPY      FL GUIDED CENTRAL VENOUS ACCESS DEVICE INSERTION  1/11/2022    FOOT AMPUTATION Left 2/10/2020    Procedure: PARTIAL 1ST RAY AMPUTATION;  Surgeon: Brittaney Oviedo DPM;  [facility];  Service: Podiatry    INCISION AND DRAINAGE OF WOUND Left 1/12/2020    Procedure: INCISION AND DRAINAGE (I&D) EXTREMITY AND REMOVAL OF SESMOID BONE;  Surgeon: Denise Rolle DPM;  [facility];  Service: Podiatry    IR OTHER  1/21/2022    IR PICC REPOSITION  1/14/2020    KNEE ARTHROSCOPY Left     KNEE ARTHROSCOPY Right     KNEE SURGERY      AK AMPUTATION TOE,I-P JT Bilateral 5/2/2017    Procedure: PARTIAL AMPUTATION RIGHT AND LEFT HALLUX ;  Surgeon: Brittaney Oviedo DPM;  [facility];  Service: Podiatry    AK AMPUTATION TOE,MT-P JT Left 7/25/2017    Procedure: 2ND TOE AMPUTATION;  Surgeon: Brittaney Oviedo DPM;  [facility];  Service: Podiatry    AK INSJ TUNNELED CTR VAD W/SUBQ PORT AGE 5 YR/> N/A 1/11/2022    Procedure: INSERTION VENOUS PORT ( PORT-A-CATH) IR;  Surgeon: Emani Rubi DO;  [facility];  Service: Interventional Radiology    RESECTION LOW ANTERIOR LAPAROSCOPIC N/A 10/21/2021    Procedure: RESECTION LOW ANTERIOR LAPAROSCOPIC;  Surgeon: Keenan Loving MD;  Location:  MAIN OR;  Service: Colorectal    SHOULDER ARTHROSCOPY Left     with screws,RTC    SHOULDER SURGERY      TOE AMPUTATION Left 2020    Procedure: Janie Christian;  Surgeon: Anurag Ernandez DPM;  [facility];  Service: Podiatry    UPPER GASTROINTESTINAL ENDOSCOPY  2020    Intestinal metaplasia prepyloric    VASECTOMY         Family History   Problem Relation Age of Onset    Heart disease Father         passed away   Brayden Bones Hypertension Father     Pulmonary embolism Father     Hypertension Mother         assed away       Social History     Socioeconomic History    Marital status: /Civil Union     Spouse name: Not on file    Number of children: Not on file    Years of education: Not on file    Highest education level: Not on file   Occupational History    Not on file   Tobacco Use    Smoking status: Former Smoker     Packs/day: 0 50     Years: 20 00     Pack years: 10 00     Types: Cigarettes     Start date: 1     Quit date:      Years since quittin 2    Smokeless tobacco: Never Used    Tobacco comment: quit 10 years ago   Vaping Use    Vaping Use: Never used   Substance and Sexual Activity    Alcohol use: Never     Alcohol/week: 0 0 standard drinks    Drug use: No    Sexual activity: Not Currently     Partners: Female   Other Topics Concern    Not on file   Social History Narrative    ** Merged History Encounter **         Daily cola consumption (2 cans/day)     Social Determinants of Health     Financial Resource Strain: Not on file   Food Insecurity: No Food Insecurity    Worried About Running Out of Food in the Last Year: Never true    Joel of Food in the Last Year: Never true   Transportation Needs: No Transportation Needs    Lack of Transportation (Medical): No    Lack of Transportation (Non-Medical):  No   Physical Activity: Not on file   Stress: Not on file   Social Connections: Not on file Intimate Partner Violence: Not on file   Housing Stability: Low Risk     Unable to Pay for Housing in the Last Year: No    Number of Places Lived in the Last Year: 1    Unstable Housing in the Last Year: No       Allergies   Allergen Reactions    Invokana [Canagliflozin] Other (See Comments)     Caused circulation problems    Lamisil [Terbinafine] Blisters     Wife states " His skin peeled from head to toe"    Other Swelling     Pomegranate - facial swelling, no swelling of tongue, esophagus  Adhesive tape   Latex Rash         Current Outpatient Medications:     allopurinol (ZYLOPRIM) 300 mg tablet, Take 1 tablet (300 mg total) by mouth every morning, Disp: 90 tablet, Rfl: 0    amLODIPine (NORVASC) 10 mg tablet, Take 1 tablet (10 mg total) by mouth daily, Disp: 90 tablet, Rfl: 3    apixaban (ELIQUIS) 5 mg, Take 1 tablet (5 mg total) by mouth every 12 (twelve) hours, Disp: 180 tablet, Rfl: 3    Dupilumab (Dupixent) 300 MG/2ML SOPN, Inject 300 mg under the skin Once every 2 weeks, Disp: , Rfl:     famotidine (PEPCID) 40 MG tablet, TAKE 1 TABLET BY MOUTH EVERY DAY, Disp: 90 tablet, Rfl: 3    Insulin Degludec-Liraglutide (Xultophy) 100 units-3 6 mg/mL injection pen, Inject 19 units daily  , Disp: 18 mL, Rfl: 1    Insulin Pen Needle (BD Pen Needle Zenaida U/F) 32G X 4 MM MISC, Use 1 daily, Disp: 100 each, Rfl: 2    metolazone (ZAROXOLYN) 10 mg tablet, Take 1 tablet (10 mg total) by mouth once a week, Disp: 20 tablet, Rfl: 3    metoprolol tartrate (LOPRESSOR) 50 mg tablet, Take 1 tablet (50 mg total) by mouth every 12 (twelve) hours, Disp: 180 tablet, Rfl: 3    Multiple Vitamin (MULTIVITAMIN) tablet, Take 1 tablet by mouth daily IN AM, Disp: , Rfl:     omega-3-acid ethyl esters (LOVAZA) 1 g capsule, Take 2 capsules (2 g total) by mouth 2 (two) times a day Mail print prescription to pt, Disp: 360 capsule, Rfl: 3    OneTouch Ultra test strip, USE TO TEST BLOOD SUGAR 3 TIMES A DAY, Disp: 100 each, Rfl: 3    simvastatin (ZOCOR) 20 mg tablet, Take 1 tablet (20 mg total) by mouth daily at bedtime, Disp: 90 tablet, Rfl: 3    sodium bicarbonate 650 mg tablet, Take 1 tablet (650 mg total) by mouth 2 (two) times daily after meals, Disp: 180 tablet, Rfl: 0    tamsulosin (FLOMAX) 0 4 mg, TAKE 1 CAPSULE BY MOUTH EVERY DAY WITH DINNER, Disp: 90 capsule, Rfl: 3    torsemide (DEMADEX) 20 mg tablet, Take 2 tablets (40 mg total) by mouth 2 (two) times a day 40mg am and 20mg pm, Disp: 270 tablet, Rfl: 3    ondansetron (ZOFRAN) 8 mg tablet, Take 1 tablet (8 mg total) by mouth every 8 (eight) hours as needed for nausea or vomiting, Disp: 20 tablet, Rfl: 3    Review of Systems   Constitutional: Positive for activity change and fatigue  Respiratory: Positive for shortness of breath (with exertion)  Cardiovascular: Positive for leg swelling  Gastrointestinal: Positive for constipation and diarrhea  Skin: Positive for wound (skin graft bottom of L foot)  Allergic/Immunologic: Positive for food allergies (pomegranate)  All other systems reviewed and are negative  I have personally reviewed the ROS entered by MA and agree as documented  Objective:      /58 (BP Location: Right arm, Patient Position: Sitting, Cuff Size: Standard)   Ht 5' 10" (1 778 m)   Wt 93 7 kg (206 lb 9 6 oz)   BMI 29 64 kg/m²          Physical Exam  Constitutional:       Appearance: Normal appearance  HENT:      Head: Normocephalic and atraumatic  Cardiovascular:      Rate and Rhythm: Normal rate  Pulses:           Radial pulses are 2+ on the right side and 2+ on the left side  Dorsalis pedis pulses are 0 on the right side and 0 on the left side  Posterior tibial pulses are 0 on the right side and 0 on the left side        Comments: Unable to palpate pedal pulses due to 2+pitting edema and left foot dressing over chronic wound  Pulmonary:      Effort: Pulmonary effort is normal    Abdominal:      General: There is no distension  Palpations: Abdomen is soft  Tenderness: There is no abdominal tenderness  Musculoskeletal:         General: Normal range of motion  Cervical back: Normal range of motion and neck supple  Right lower leg: Edema present  Left lower leg: Edema present  Skin:     General: Skin is warm and dry  Capillary Refill: Capillary refill takes less than 2 seconds  Neurological:      General: No focal deficit present  Mental Status: He is alert and oriented to person, place, and time  Psychiatric:         Mood and Affect: Mood normal          Behavior: Behavior normal          Thought Content:  Thought content normal          Judgment: Judgment normal

## 2022-04-12 NOTE — ASSESSMENT & PLAN NOTE
Colon cancer with concern for abdominal carcinomatosis on palliative chemotherapy  Follows with Dr Sj Cardoso  Receives treatment for 5 weeks at a time and 1 week off  Has a break in May between 5/16 and 5/30 for possible surgery  Will send a message to his oncologist regarding proceeding with AVF creation during that period of time

## 2022-04-12 NOTE — ASSESSMENT & PLAN NOTE
Lab Results   Component Value Date    EGFR 14 04/01/2022    EGFR 16 03/25/2022    EGFR 18 03/18/2022    CREATININE 3 92 (H) 04/01/2022    CREATININE 3 49 (H) 03/25/2022    CREATININE 3 26 (H) 03/18/2022     CKD stage 4, not yet on dialysis  Unclear when may require dialysis but referred for new access creation  Had previously required dialysis session x 2 through a temporary dialysis catheter when hospitalized with ELZBIETA/CHF and is now off dialysis  He is right handed  Has never had other dialysis access  Denies upper extremity swelling or paresthesias  Negative Lars's test left hand  Palpable radial/ulnar pulses bilaterally  Vein mapping reviewed - adequate caliber distal radial artery 2 9mm left, adequate cephalic vein throughout the length of the left arm     -Discussed options for dialysis access, including arteriovenous fistula vs  Graft creation  Typically recommend proceeding in the nondominant arm first for dialysis access creation  His vein mapping suggests he would be a candidate for a left radiocephalic AVF creation  He is not yet on dialysis and would not plan to place an AVG if veins are not suitable until he is closer to needing dialysis  -Discussed all risks and benefits of the procedure including bleeding, infection, injury to surrounding structures, wound healing issues, steal syndrome, arm swelling, need for revision/further intervention to assist with maturity, fistula thrombosis  He is at increased risk for poor healing, wound breakdown, infection due to simultaneous chemotherapy  He is understanding of these risks and agreeable to proceed  Informed consent was obtained   -Will try to avoid general anesthesia in setting of significant cardiac risk factors  Recommend regional anesthesia with sedation  Will obtain pre-op risk stratification given significant cardiac risk factors and clearance to hold eliquis for surgery

## 2022-04-12 NOTE — LETTER
22    Re: Cardiology  Clearance    Patient Name: Concepcion Jason   Patient : 1955   Patient MRN: [id number]   Patient Phone: [phone]     Dr Avelino Giles,    Dr Archana Titus MD is requesting clearance for above patient, prior to proceeding with Left Upper Extremity Arteri Fistula  We have tentatively scheduled patient's procedure for n/a at the [address] [place] [address], pending clearance  Patient will be given general anesthesia  We spoke with your office today regarding clearance request   Lizbeth Palencia informed us that patient was recently seen and may not require an appointment with you  They informed us that they will reach out to the patient to schedule if needed  Please fax your recommendations, including any medication recommendations, to [phone], attention nursing  Or route your recommendations to Epic pool [facility] Clearance Pool [[id number]]  Please reach out with any questions or concerns      Sincerely,    [facility]

## 2022-04-12 NOTE — TELEPHONE ENCOUNTER
REMINDER: Under Reason For Call, comments MUST be formatted as:   (Surgeon's Initials) / (Procedure)      Special Instructions / FYI: 2 weeks but on chemotherapy, ideally schedule during chemo break in May (break between May 17-May 29)       Procedure: Left upper extremity arteriovenous fistula    Level: 2 - Route clearance(s) to [facility] Clearance Pool     Allergies: Invokana [canagliflozin], Lamisil [terbinafine], Other, and Latex    Instructions Given: NO Bowel Prep General Instructions     Dialysis: Patient is not on dialysis  Return Visit Required Prior to Procedure: No     Consent: I certify that patient has signed, printed, timed, and dated their surgery consent  I certify that the patient's LEGAL NAME and DATE OF BIRTH are written in the upper left corner on BOTH sides of the consent  I certify that BOTH sides of the completed surgery consent have been scanned into the patient's Epic chart by myself on 4/12/2022  Yes, I have LABELED the consent in Epic as Consent for Vascular Procedure  For Surgical Clearances     Levels   1-3   ROUTE this encounter to [facility] Clearance Pool (AND)   [facility] Surgery Coordinator Pool     Level   4   ROUTE this encounter to [facility] Surgery Coordinator Pool       HYDRATION CLEARANCES   ONLY ROUTE TO  [facility] Clearance Pool     (1) ONE CLEARANCE NEEDED - Cardiology  Clearance // Clearing Provider's Name (Cali Christianson): Dr Brianna Perez //  Spoke with: Sampson Hernandez  //  Office Contact Information P: [phone] - F: [phone]    Yes, I have ROUTED this encounter to [facility] Surgery Coordinator and/or [facility] Clearance Pool

## 2022-04-12 NOTE — LETTER
April 12, 2022     Shweta Whitaker, [address]  [address] [place] [address]    Patient: Concepcion Jason   YOB: 1955   Date of Visit: 4/12/2022       Dear Dr Karan Evans: Thank you for referring Concepcion Jason to me for evaluation  Below are the relevant portions of my assessment and plan of care  Diagnoses and all orders for this visit:    Colon cancer [facility])  Colon cancer with concern for abdominal carcinomatosis on palliative chemotherapy  Follows with Dr Tea Mercado  Receives treatment for 5 weeks at a time and 1 week off  Has a break in May between 5/16 and 5/30 for possible surgery  Will send a message to his oncologist regarding proceeding with AVF creation during that period of time      Diabetic ulcer of toe of left foot associated with type 2 diabetes mellitus ([address] )  Left foot diabetic wound s/p skin graft, follows with [facility]  Had nearly healed but opened up again with starting chemotherapy  Follows with the regularly  The foot is wrapped in their specific wound care regimen which I am not able to find through Epic  Continue local wound care per podiatry  No concern for infection per patient            Lab Results   Component Value Date     HGBA1C 6 0 (H) 02/18/2022         Stage 4 chronic kidney disease (HCC)        Lab Results   Component Value Date     EGFR 14 04/01/2022     EGFR 16 03/25/2022     EGFR 18 03/18/2022     CREATININE 3 92 (H) 04/01/2022     CREATININE 3 49 (H) 03/25/2022     CREATININE 3 26 (H) 03/18/2022      CKD stage 4, not yet on dialysis  Unclear when may require dialysis but referred for new access creation  Had previously required dialysis session x 2 through a temporary dialysis catheter when hospitalized with ELZBIETA/CHF and is now off dialysis  He is right handed  Has never had other dialysis access  Denies upper extremity swelling or paresthesias  Negative Lars's test left hand   Palpable radial/ulnar pulses bilaterally      Vein mapping reviewed - adequate caliber distal radial artery 2 9mm left, adequate cephalic vein throughout the length of the left arm      -Discussed options for dialysis access, including arteriovenous fistula vs  Graft creation  Typically recommend proceeding in the nondominant arm first for dialysis access creation  His vein mapping suggests he would be a candidate for a left radiocephalic AVF creation  He is not yet on dialysis and would not plan to place an AVG if veins are not suitable until he is closer to needing dialysis  -Discussed all risks and benefits of the procedure including bleeding, infection, injury to surrounding structures, wound healing issues, steal syndrome, arm swelling, need for revision/further intervention to assist with maturity, fistula thrombosis  He is at increased risk for poor healing, wound breakdown, infection due to simultaneous chemotherapy  He is understanding of these risks and agreeable to proceed  Informed consent was obtained   -Will try to avoid general anesthesia in setting of significant cardiac risk factors  Recommend regional anesthesia with sedation  If you have questions, please do not hesitate to call me  I look forward to following Joyce Allred along with you           Sincerely,        Keily Eugene MD        CC: Knox Dakin,

## 2022-04-13 NOTE — TELEPHONE ENCOUNTER
I wanted to reach out regarding the timing for the pt's left upper extremity arteriovenous fistula placement with Dr Skye Perez  The surgery needs to be scheduled during the pt's chemo break which is 5/17/22-5/29/22  We can do 5/18/22 at the [facility] with Dr Skye Perez for surgery  Can the pt be cleared by this date?

## 2022-04-14 ENCOUNTER — OFFICE VISIT (OUTPATIENT)
Dept: HEMATOLOGY ONCOLOGY | Facility: CLINIC | Age: 67
End: 2022-04-14
Payer: MEDICARE

## 2022-04-14 VITALS
TEMPERATURE: 98.8 F | OXYGEN SATURATION: 94 % | HEIGHT: 70 IN | HEART RATE: 76 BPM | WEIGHT: 205 LBS | DIASTOLIC BLOOD PRESSURE: 72 MMHG | BODY MASS INDEX: 29.35 KG/M2 | SYSTOLIC BLOOD PRESSURE: 140 MMHG | RESPIRATION RATE: 18 BRPM

## 2022-04-14 DIAGNOSIS — D72.828 OTHER ELEVATED WHITE BLOOD CELL (WBC) COUNT: ICD-10-CM

## 2022-04-14 DIAGNOSIS — N18.4 STAGE 4 CHRONIC KIDNEY DISEASE (HCC): ICD-10-CM

## 2022-04-14 DIAGNOSIS — D64.9 NORMOCYTIC ANEMIA: ICD-10-CM

## 2022-04-14 DIAGNOSIS — K59.09 OTHER CONSTIPATION: ICD-10-CM

## 2022-04-14 DIAGNOSIS — D50.9 IRON DEFICIENCY ANEMIA, UNSPECIFIED IRON DEFICIENCY ANEMIA TYPE: ICD-10-CM

## 2022-04-14 DIAGNOSIS — D50.8 OTHER IRON DEFICIENCY ANEMIAS: ICD-10-CM

## 2022-04-14 DIAGNOSIS — R53.83 OTHER FATIGUE: ICD-10-CM

## 2022-04-14 DIAGNOSIS — C18.7 MALIGNANT NEOPLASM OF SIGMOID COLON (HCC): Primary | ICD-10-CM

## 2022-04-14 DIAGNOSIS — E55.9 VITAMIN D DEFICIENCY: ICD-10-CM

## 2022-04-14 DIAGNOSIS — D53.9 NUTRITIONAL ANEMIA: ICD-10-CM

## 2022-04-14 PROCEDURE — 99214 OFFICE O/P EST MOD 30 MIN: CPT | Performed by: NURSE PRACTITIONER

## 2022-04-14 RX ORDER — FLUOROURACIL 50 MG/ML
275 INJECTION, SOLUTION INTRAVENOUS ONCE
Status: CANCELLED | OUTPATIENT
Start: 2022-05-03

## 2022-04-14 RX ORDER — FLUOROURACIL 50 MG/ML
275 INJECTION, SOLUTION INTRAVENOUS ONCE
Status: CANCELLED | OUTPATIENT
Start: 2022-05-24

## 2022-04-14 RX ORDER — FLUOROURACIL 50 MG/ML
275 INJECTION, SOLUTION INTRAVENOUS ONCE
Status: CANCELLED | OUTPATIENT
Start: 2022-04-18

## 2022-04-14 RX ORDER — FLUOROURACIL 50 MG/ML
275 INJECTION, SOLUTION INTRAVENOUS ONCE
Status: CANCELLED | OUTPATIENT
Start: 2022-05-10

## 2022-04-14 RX ORDER — FLUOROURACIL 50 MG/ML
275 INJECTION, SOLUTION INTRAVENOUS ONCE
Status: CANCELLED | OUTPATIENT
Start: 2022-05-17

## 2022-04-14 NOTE — PROGRESS NOTES
Hematology/Oncology Outpatient Follow-up  Shira Sanders 74 y o  male 1955 [id number]    Date:  4/14/2022      Assessment and Plan:  1  Malignant neoplasm of sigmoid colon ([facility] [address] )  Patient will be continued on his current treatment which he is tolerating much better with 5 FU/Leucovorin at 50% (250mg/m2) of the usual dose IV push weekly 5 weeks on with 1 week of a break  He is currently on his week of a break and will be starting cycle 2 on Monday 04/18/2022  We did discuss increasing his dose slightly cycle to by 10% as per plan with close monitoring for tolerance; patient is agreeable to this  Will review/address his pending labs once they become available  Will arrange for follow-up appointment again in about 2 weeks for close monitoring of patient  Will likely aim to repeat imaging perhaps after completing 3 cycles  - CBC and differential; Standing  - Comprehensive metabolic panel; Standing  - Magnesium; Standing  - C-reactive protein; Future  - Sedimentation rate, automated; Future  - CEA; Future  - CBC and differential  - Comprehensive metabolic panel  - Magnesium    2  Normocytic anemia  Patient continues to have stable normocytic anemia his most recent hemoglobin from 2 weeks ago was 10 7  Will be getting repeat labs tomorrow  His anemia is multifactorial including anemia of chronic renal dysfunction, anemia of chronic disease and treatment related  Awaiting repeat iron panel, Y24 and folic acid which is already ordered  - CBC and differential; Standing  - Comprehensive metabolic panel; Standing  - Magnesium; Standing  - C-reactive protein; Future  - Sedimentation rate, automated; Future  - CBC and differential  - Comprehensive metabolic panel  - Magnesium  - Folate; Future    3  Other elevated white blood cell (WBC) count  Patient continues to have leukocytosis which seems to be a chronic process  He denies any symptoms of infection and is afebrile  Is a former smoker    Likely due to chronic inflammation versus COPD     - CBC and differential; Standing  - C-reactive protein; Future  - Sedimentation rate, automated; Future  - CBC and differential    4  Other fatigue  Patient reports that he has been more fatigued since starting his new treatment regimen  His fatigue is likely multifactorial including treatment, anemia and comorbidities  He will be getting repeat iron panel and vitamin B12 drawn tomorrow  Will also check his folic acid and vitamin D deficiency to ensure he does not have any correctable nutritional deficiencies  Will also check TSH  Patient is wife were told that if his laboratory workup is unrevealing could also consider physical therapy evaluation     - TSH, 3rd generation with Free T4 reflex; Future  - Vitamin D 25 hydroxy; Future  - Folate; Future    5  Other constipation  Patient continues to report ongoing constipation despite taking stool softeners  Recommended that he trial Senokot S 2 tablets at hour sleep which can be increased to 2 tablets up to 3 times a day if needed or alternatively MiraLax daily which can be increased to twice daily dosing if needed  He was also encouraged to try to drink more water and stay active  HPI:  Patient presents today for a follow-up visit accompanied by his wife  He completed his 1st cycle/5 weeks of the dose adjusted 5 FU/Leucovorin IV push is currently on his week of a break  He mentions that he tolerated it well without any significant adverse effects  Does mention today that he seems more fatigued with less energy than usual since starting the new regimen  He also mentions that he continues to struggle with constipation and has been taking stool softener  No other new complaints  He still has slow healing of his left foot skin graft which is being monitored very closely by his podiatrist     Patient will be going for repeat laboratory studies tomorrow    His most recent laboratory studies from 2 weeks ago 04/01/2022 showed again leukocytosis WBC 12 42 mainly neutrophilia and no hint of immature cells, he has stable normocytic anemia H&H 10 7/31 3, MCV 93 with normal platelet count 057  , creatinine 3 92, GFR 14, glucose 133, total bili again slightly elevated 1 28 remaining metabolic panel is appropriate  Oncology History Overview Note   Patient with multiple comorbid conditions including CHF, advanced chronic kidney disease, diabetes mellitus, arthritis, sleep apnea, etc   He apparently had multiple colonoscopies 2021  He was found to have adenocarcinoma in the sigmoid region rising from tubular adenoma on 01/08/2021  He had a PET-CT scan on 02/19/2021 which showed uptake in the sigmoid colon otherwise no finding for hypermetabolic metastasis was found  The patient had 2 other colonoscopies and finally had his laparoscopic surgery on 10/21/2021 which showed showed residual adenocarcinoma moderately differentiated arising in the tubular adenoma with high-grade dysplasia, pT3 with 3/16 lymph node involvement  All margins were negative without obvious lymphovascular or perineural invasion  The patient subsequently on 11/24/2021 had a CT scan of the chest abdomen pelvis and IV contrast was given by mistake which show resulted in significant worsening of his kidney function with creatinine around 7  The he required hospitalization  His baseline creatinine level is around 3  The CT scan on 11/24/2021 showed 1 new cm nodule ground-glass density in the superior segment of the right lower lobe which was thought to be inflammatory versus neoplastic  There are 2 areas of heterogeneous attenuation also in the abdominal area of unknown significance  Follow-up in 3 months was suggested  PET/CT 1/4/22:   IMPRESSION:  1   1 0 cm groundglass right lower lobe lung nodules seen on CT of chest dated 11/24/2021 is not definitively visualized on the current exam and likely has resolved although evaluation of the lung fields is limited secondary to respiratory motion   artifact  There is no focal FDG activity in the chest characteristic of hypermetabolic malignancy  Given limitations, short interval follow-up with CT of chest in 3 months is recommended to document resolution  2   Multifocal peritoneal hypermetabolic soft tissue foci involving the left mid abdomen and extending along the left paracolic gutter to the central lower abdomen/pelvis, most of which appears to be associated with internal attenuation and is most   suggestive of fat necrosis with underlying peritoneal carcinomatosis not excluded, particularly in the anterior left mid abdomen  As recommended previously, short interval follow-up with imaging in 3 months is recommended to ensure stability and exclude   developing peritoneal carcinomatosis  3   Moderate bilateral pleural effusions  4   Stable in size minimally FDG avid mediastinal/right hilar/left inguinal lymph nodes of uncertain clinical significance but most suggestive of a benign inflammatory process  Attention to these regions on follow-up scans is recommended  5   Air-fluid levels within the bilateral maxillary sinuses for which correlation is recommended to exclude acute sinusitis  He was started on the FOLFOX regimen without the oxaliplatin 1/2022  He was educated he will be educated extensively about the chemotherapy and the potential side effects including renal failure/hemodialysis risk  Patient did not tolerate the 1st cycle of chemotherapy well at all which resulted in hospitalization lesion and need for dialysis x2 treatments  He sought 2nd opinion in network with 1 of our colleagues Sheila [phone]  Decision was made to trial 5 FU/Leucovorin at 50% of the usual dose (250mg/m2) via IV push weekly 5 weeks on with 1 week of a break which can be adjusted/titrated slowly according to patient's tolerance        Colon cancer ([facility] [address] )   10/23/2021 Initial Diagnosis    Colon cancer ([facility] [address] )     1/18/2022 - 1/20/2022 Chemotherapy    fluorouracil (ADRUCIL), 400 mg/m2 = 830 mg, Intravenous, Once, 1 of 1 cycle  Administration: 830 mg (1/18/2022)  leucovorin calcium IVPB, 828 mg, Intravenous, Once, 1 of 12 cycles  Administration: 800 mg (1/18/2022)  fluorouracil (ADRUCIL) ambulatory infusion Soln, 1,200 mg/m2/day = 4,970 mg, Intravenous, Over 46 hours, 1 of 12 cycles  Dose modification: 800 mg/m2/day (original dose 1,200 mg/m2/day, Cycle 2, Reason: Other (Must fill in a comment), Comment: Dr young )     3/7/2022 -  Chemotherapy    fluorouracil (ADRUCIL), 515 mg (62 5 % of original dose 400 mg/m2), Intravenous, Once, 1 of 12 cycles  Dose modification: 250 mg/m2 (original dose 400 mg/m2, Cycle 1, Reason: Dose modified as per discussion with consulting physician)  Administration: 500 mg (3/7/2022), 500 mg (3/14/2022), 500 mg (3/21/2022), 500 mg (3/28/2022), 500 mg (4/4/2022)  leucovorin calcium IVPB, 515 mg (62 5 % of original dose 400 mg/m2), Intravenous, Once, 1 of 12 cycles  Dose modification: 250 mg/m2 (original dose 400 mg/m2, Cycle 1, Reason: Dose modified as per discussion with consulting physician)  Administration: 500 mg (3/7/2022), 500 mg (3/14/2022), 500 mg (3/21/2022), 500 mg (3/28/2022), 500 mg (4/4/2022)  fluorouracil (ADRUCIL) ambulatory infusion Soln, 1,200 mg/m2/day = 4,945 mg, Intravenous, Over 46 hours, 0 of 11 cycles         Interval history:    ROS: Review of Systems   Constitutional: Positive for fatigue  Negative for activity change, appetite change, chills, fever and unexpected weight change  HENT: Positive for hearing loss  Negative for congestion, mouth sores, nosebleeds, sore throat and trouble swallowing  Eyes: Negative  Respiratory: Positive for shortness of breath  Negative for cough and chest tightness  Cardiovascular: Positive for leg swelling (chronic)  Negative for chest pain and palpitations  Gastrointestinal: Positive for constipation   Negative for abdominal distention, abdominal pain, blood in stool, diarrhea, nausea and vomiting  Genitourinary: Negative for difficulty urinating, dysuria, frequency, hematuria and urgency  Musculoskeletal: Positive for arthralgias and myalgias  Negative for back pain, gait problem and joint swelling  Skin: Positive for color change, rash and wound (chronic skin changes no change from baseline)  Negative for pallor  Neurological: Negative for dizziness, weakness, light-headedness, numbness and headaches  Hematological: Negative for adenopathy  Does not bruise/bleed easily  Psychiatric/Behavioral: Positive for dysphoric mood and sleep disturbance         Past Medical History:   Diagnosis Date    Anemia     iron def    Arthritis     OA    Bruise of both arms     forearms and both hands    Bruises easily     Cancer ([facility] ) 09/01/2021    colon    CH[facility]congestive heart failure) [facility])     Chronic kidney disease     CPAP (continuous positive airway pressure) dependence     Diabetes mellitus ([place] [address] )     Diabetic foot ulcer ([place] [address] )     Eczema     Erectile dysfunction     Gout     Heart murmur     History of permanent cardiac pacemaker placement 11/2018    Hyperlipidemia     Hypertension     Hypoglycemia 3/8/2019    Murmur     Nephropathy     Osteoarthritis     Pacemaker 11/06/2018    PAD (peripheral artery disease) ([facility])     Seasonal allergies     Sleep apnea     Toe infection     bilat great toes    Vertigo     Walks frequently     Wears dentures     upper    Wears glasses        Past Surgical History:   Procedure Laterality Date    CARDIAC PACEMAKER PLACEMENT      CARPAL TUNNEL RELEASE Left     CARPAL TUNNEL RELEASE Right     CARPAL TUNNEL RELEASE      COLONOSCOPY  08/2020    COLONOSCOPY      FL GUIDED CENTRAL VENOUS ACCESS DEVICE INSERTION  1/11/2022    FOOT AMPUTATION Left 2/10/2020    Procedure: PARTIAL 1ST RAY AMPUTATION;  Surgeon: Kai Nagy DPM;  [facility];  Service: Podiatry    INCISION AND DRAINAGE OF WOUND Left 2020    Procedure: INCISION AND DRAINAGE (I&D) EXTREMITY AND REMOVAL OF SESMOID BONE;  Surgeon: Franklin Paulson DPM;  [facility];  Service: Podiatry    IR OTHER  2022    IR PICC REPOSITION  2020    KNEE ARTHROSCOPY Left     KNEE ARTHROSCOPY Right     KNEE SURGERY      AR AMPUTATION TOE,I-P JT Bilateral 2017    Procedure: PARTIAL AMPUTATION RIGHT AND LEFT HALLUX ;  Surgeon: Nga Ma DPM;  [facility];  Service: Podiatry    AR AMPUTATION TOE,MT-P JT Left 2017    Procedure: 2ND TOE AMPUTATION;  Surgeon: Nga Ma DPM;  [facility];  Service: Podiatry    AR INSJ TUNNELED CTR VAD W/SUBQ PORT AGE 5 YR/> N/A 2022    Procedure: INSERTION VENOUS PORT ( PORT-A-CATH) IR;  Surgeon: Anup Felder DO;  [facility];  Service: Interventional Radiology    RESECTION LOW ANTERIOR LAPAROSCOPIC N/A 10/21/2021    Procedure: RESECTION LOW ANTERIOR LAPAROSCOPIC;  Surgeon: Terry Islas MD;  [facility];  Service: Colorectal    SHOULDER ARTHROSCOPY Left     with screws,RTC    SHOULDER SURGERY      TOE AMPUTATION Left 2020    Procedure: Albert Hilliard;  Surgeon: Franklin Paulson DPM;  [facility];  Service: Podiatry    UPPER GASTROINTESTINAL ENDOSCOPY  2020    Intestinal metaplasia prepyloric    VASECTOMY         Social History     Socioeconomic History    Marital status: /Civil Union     Spouse name: None    Number of children: None    Years of education: None    Highest education level: None   Occupational History    None   Tobacco Use    Smoking status: Former Smoker     Packs/day: 0 50     Years: 20 00     Pack years: 10 00     Types: Cigarettes     Start date: 1     Quit date:      Years since quittin 2    Smokeless tobacco: Never Used    Tobacco comment: quit 10 years ago   Vaping Use    Vaping Use: Never used   Substance and Sexual Activity    Alcohol use: Never Alcohol/week: 0 0 standard drinks    Drug use: No    Sexual activity: Not Currently     Partners: Female   Other Topics Concern    None   Social History Narrative    ** Merged History Encounter **         Daily cola consumption (2 cans/day)     Social Determinants of Health     Financial Resource Strain: Not on file   Food Insecurity: No Food Insecurity    Worried About Running Out of Food in the Last Year: Never true    Joel of Food in the Last Year: Never true   Transportation Needs: No Transportation Needs    Lack of Transportation (Medical): No    Lack of Transportation (Non-Medical): No   Physical Activity: Not on file   Stress: Not on file   Social Connections: Not on file   Intimate Partner Violence: Not on file   Housing Stability: Low Risk     Unable to Pay for Housing in the Last Year: No    Number of Places Lived in the Last Year: 1    Unstable Housing in the Last Year: No       Family History   Problem Relation Age of Onset    Heart disease Father         passed away    Hypertension Father     Pulmonary embolism Father     Hypertension Mother         assed away       Allergies   Allergen Reactions    Invokana [Canagliflozin] Other (See Comments)     Caused circulation problems    Lamisil [Terbinafine] Blisters     Wife states " His skin peeled from head to toe"    Other Swelling     Pomegranate - facial swelling, no swelling of tongue, esophagus  Adhesive tape        Latex Rash         Current Outpatient Medications:     allopurinol (ZYLOPRIM) 300 mg tablet, Take 1 tablet (300 mg total) by mouth every morning, Disp: 90 tablet, Rfl: 0    amLODIPine (NORVASC) 10 mg tablet, Take 1 tablet (10 mg total) by mouth daily, Disp: 90 tablet, Rfl: 3    apixaban (ELIQUIS) 5 mg, Take 1 tablet (5 mg total) by mouth every 12 (twelve) hours, Disp: 180 tablet, Rfl: 3    Dupilumab (Dupixent) 300 MG/2ML SOPN, Inject 300 mg under the skin Once every 2 weeks, Disp: , Rfl:     famotidine (PEPCID) 40 MG tablet, TAKE 1 TABLET BY MOUTH EVERY DAY, Disp: 90 tablet, Rfl: 3    Insulin Degludec-Liraglutide (Xultophy) 100 units-3 6 mg/mL injection pen, Inject 19 units daily  , Disp: 18 mL, Rfl: 1    Insulin Pen Needle (BD Pen Needle Zenaida U/F) 32G X 4 MM MISC, Use 1 daily, Disp: 100 each, Rfl: 2    metolazone (ZAROXOLYN) 10 mg tablet, Take 1 tablet (10 mg total) by mouth once a week, Disp: 20 tablet, Rfl: 3    metoprolol tartrate (LOPRESSOR) 50 mg tablet, Take 1 tablet (50 mg total) by mouth every 12 (twelve) hours, Disp: 180 tablet, Rfl: 3    Multiple Vitamin (MULTIVITAMIN) tablet, Take 1 tablet by mouth daily IN AM, Disp: , Rfl:     omega-3-acid ethyl esters (LOVAZA) 1 g capsule, Take 2 capsules (2 g total) by mouth 2 (two) times a day Mail print prescription to pt, Disp: 360 capsule, Rfl: 3    ondansetron (ZOFRAN) 8 mg tablet, Take 1 tablet (8 mg total) by mouth every 8 (eight) hours as needed for nausea or vomiting, Disp: 20 tablet, Rfl: 3    OneTouch Ultra test strip, USE TO TEST BLOOD SUGAR 3 TIMES A DAY, Disp: 100 each, Rfl: 3    simvastatin (ZOCOR) 20 mg tablet, Take 1 tablet (20 mg total) by mouth daily at bedtime, Disp: 90 tablet, Rfl: 3    sodium bicarbonate 650 mg tablet, Take 1 tablet (650 mg total) by mouth 2 (two) times daily after meals, Disp: 180 tablet, Rfl: 0    tamsulosin (FLOMAX) 0 4 mg, TAKE 1 CAPSULE BY MOUTH EVERY DAY WITH DINNER, Disp: 90 capsule, Rfl: 3    torsemide (DEMADEX) 20 mg tablet, Take 2 tablets (40 mg total) by mouth 2 (two) times a day 40mg am and 20mg pm, Disp: 270 tablet, Rfl: 3      Physical Exam:  /72 (BP Location: Left arm, Patient Position: Sitting, Cuff Size: Adult)   Pulse 76   Temp 98 8 °F (37 1 °C)   Resp 18   Ht 5' 10" (1 778 m)   Wt 93 kg (205 lb)   SpO2 94%   BMI 29 41 kg/m²     Physical Exam  Vitals reviewed  Constitutional:       General: He is not in acute distress  Appearance: He is well-developed  He is not diaphoretic     HENT: Head: Normocephalic and atraumatic  Eyes:      General: Lids are normal  No scleral icterus  Conjunctiva/sclera: Conjunctivae normal       Pupils: Pupils are equal, round, and reactive to light  Neck:      Thyroid: No thyromegaly  Cardiovascular:      Rate and Rhythm: Normal rate and regular rhythm  Heart sounds: Murmur heard  Systolic murmur is present  Pulmonary:      Effort: Pulmonary effort is normal  No respiratory distress  Breath sounds: Normal breath sounds  Chest:   Breasts:      Right: No axillary adenopathy  Left: No axillary adenopathy  Abdominal:      General: There is no distension  Palpations: Abdomen is soft  There is no hepatomegaly or splenomegaly  Tenderness: There is no abdominal tenderness  Musculoskeletal:         General: No swelling  Normal range of motion  Cervical back: Normal range of motion and neck supple  Right lower leg: Edema present  Left lower leg: Edema present  Comments: Ortho shoe left foot   Lymphadenopathy:      Cervical: No cervical adenopathy  Upper Body:      Right upper body: No axillary adenopathy  Left upper body: No axillary adenopathy  Skin:     General: Skin is warm and dry  Coloration: Skin is pale  Findings: No erythema or rash  Neurological:      General: No focal deficit present  Mental Status: He is alert and oriented to person, place, and time  Psychiatric:         Mood and Affect: Mood is depressed  Affect is blunt  Behavior: Behavior normal  Behavior is cooperative  Thought Content:  Thought content normal          Judgment: Judgment normal            Labs:  Lab Results   Component Value Date    WBC 12 42 (H) 04/01/2022    HGB 10 7 (L) 04/01/2022    HCT 31 3 (L) 04/01/2022    MCV 93 04/01/2022     04/01/2022     Lab Results   Component Value Date     (L) 04/10/2017    K 3 5 04/01/2022     04/01/2022    CO2 26 04/01/2022  (H) 04/01/2022    CREATININE 3 92 (H) 04/01/2022    GLUCOSE 142 (H) 11/01/2018    GLUF 133 (H) 04/01/2022    CALCIUM 9 4 04/01/2022    CORRECTEDCA 9 9 03/18/2022    AST 16 04/01/2022    ALT 28 04/01/2022    ALKPHOS 89 04/01/2022    PROT 6 9 04/10/2017    BILITOT 0 7 04/10/2017    EGFR 14 04/01/2022       Patient voiced understanding and agreement in the above discussion  Aware to contact our office with questions/symptoms in the interim  This note has been generated by voice recognition software system  Therefore, there may be spelling, grammar, and or syntax errors  Please contact if questions arise

## 2022-04-15 ENCOUNTER — TELEPHONE (OUTPATIENT)
Dept: HEMATOLOGY ONCOLOGY | Facility: CLINIC | Age: 67
End: 2022-04-15

## 2022-04-15 ENCOUNTER — PREP FOR PROCEDURE (OUTPATIENT)
Dept: VASCULAR SURGERY | Facility: CLINIC | Age: 67
End: 2022-04-15

## 2022-04-15 ENCOUNTER — APPOINTMENT (OUTPATIENT)
Dept: LAB | Facility: CLINIC | Age: 67
End: 2022-04-15
Payer: MEDICARE

## 2022-04-15 DIAGNOSIS — N18.32 STAGE 3B CHRONIC KIDNEY DISEASE (HCC): ICD-10-CM

## 2022-04-15 DIAGNOSIS — D51.3 OTHER DIETARY VITAMIN B12 DEFICIENCY ANEMIA: ICD-10-CM

## 2022-04-15 DIAGNOSIS — D53.9 NUTRITIONAL ANEMIA: ICD-10-CM

## 2022-04-15 DIAGNOSIS — C18.7 MALIGNANT NEOPLASM OF SIGMOID COLON (HCC): ICD-10-CM

## 2022-04-15 DIAGNOSIS — E55.9 VITAMIN D DEFICIENCY: ICD-10-CM

## 2022-04-15 DIAGNOSIS — D64.9 ANEMIA, UNSPECIFIED TYPE: ICD-10-CM

## 2022-04-15 DIAGNOSIS — R53.83 OTHER FATIGUE: ICD-10-CM

## 2022-04-15 DIAGNOSIS — R79.1 ABNORMAL COAGULATION PROFILE: ICD-10-CM

## 2022-04-15 DIAGNOSIS — D64.9 NORMOCYTIC ANEMIA: ICD-10-CM

## 2022-04-15 PROBLEM — D50.8 OTHER IRON DEFICIENCY ANEMIAS: Status: ACTIVE | Noted: 2022-04-15

## 2022-04-15 LAB
25(OH)D3 SERPL-MCNC: 49.8 NG/ML (ref 30–100)
ALBUMIN SERPL BCP-MCNC: 3.3 G/DL (ref 3.5–5)
ALP SERPL-CCNC: 109 U/L (ref 46–116)
ALT SERPL W P-5'-P-CCNC: 21 U/L (ref 12–78)
ANION GAP SERPL CALCULATED.3IONS-SCNC: 9 MMOL/L (ref 4–13)
AST SERPL W P-5'-P-CCNC: 15 U/L (ref 5–45)
BASOPHILS # BLD AUTO: 0.04 THOUSANDS/ΜL (ref 0–0.1)
BASOPHILS NFR BLD AUTO: 0 % (ref 0–1)
BILIRUB SERPL-MCNC: 1.21 MG/DL (ref 0.2–1)
BUN SERPL-MCNC: 97 MG/DL (ref 5–25)
CALCIUM ALBUM COR SERPL-MCNC: 10 MG/DL (ref 8.3–10.1)
CALCIUM SERPL-MCNC: 9.4 MG/DL (ref 8.3–10.1)
CHLORIDE SERPL-SCNC: 96 MMOL/L (ref 100–108)
CO2 SERPL-SCNC: 28 MMOL/L (ref 21–32)
CREAT SERPL-MCNC: 4.21 MG/DL (ref 0.6–1.3)
EOSINOPHIL # BLD AUTO: 0.26 THOUSAND/ΜL (ref 0–0.61)
EOSINOPHIL NFR BLD AUTO: 2 % (ref 0–6)
ERYTHROCYTE [DISTWIDTH] IN BLOOD BY AUTOMATED COUNT: 15.7 % (ref 11.6–15.1)
FERRITIN SERPL-MCNC: 721 NG/ML (ref 8–388)
FOLATE SERPL-MCNC: >20 NG/ML (ref 3.1–17.5)
GFR SERPL CREATININE-BSD FRML MDRD: 13 ML/MIN/1.73SQ M
GLUCOSE P FAST SERPL-MCNC: 106 MG/DL (ref 65–99)
HCT VFR BLD AUTO: 30.1 % (ref 36.5–49.3)
HGB BLD-MCNC: 9.9 G/DL (ref 12–17)
IMM GRANULOCYTES # BLD AUTO: 0.08 THOUSAND/UL (ref 0–0.2)
IMM GRANULOCYTES NFR BLD AUTO: 1 % (ref 0–2)
IRON SATN MFR SERPL: 14 % (ref 20–50)
IRON SERPL-MCNC: 37 UG/DL (ref 65–175)
LYMPHOCYTES # BLD AUTO: 0.82 THOUSANDS/ΜL (ref 0.6–4.47)
LYMPHOCYTES NFR BLD AUTO: 6 % (ref 14–44)
MAGNESIUM SERPL-MCNC: 2.3 MG/DL (ref 1.6–2.6)
MCH RBC QN AUTO: 31.1 PG (ref 26.8–34.3)
MCHC RBC AUTO-ENTMCNC: 32.9 G/DL (ref 31.4–37.4)
MCV RBC AUTO: 95 FL (ref 82–98)
MONOCYTES # BLD AUTO: 1.51 THOUSAND/ΜL (ref 0.17–1.22)
MONOCYTES NFR BLD AUTO: 11 % (ref 4–12)
NEUTROPHILS # BLD AUTO: 10.8 THOUSANDS/ΜL (ref 1.85–7.62)
NEUTS SEG NFR BLD AUTO: 80 % (ref 43–75)
NRBC BLD AUTO-RTO: 0 /100 WBCS
PLATELET # BLD AUTO: 246 THOUSANDS/UL (ref 149–390)
PMV BLD AUTO: 10.3 FL (ref 8.9–12.7)
POTASSIUM SERPL-SCNC: 3.2 MMOL/L (ref 3.5–5.3)
PROT SERPL-MCNC: 7.5 G/DL (ref 6.4–8.2)
RBC # BLD AUTO: 3.18 MILLION/UL (ref 3.88–5.62)
SODIUM SERPL-SCNC: 133 MMOL/L (ref 136–145)
TIBC SERPL-MCNC: 259 UG/DL (ref 250–450)
TSH SERPL DL<=0.05 MIU/L-ACNC: 1.92 UIU/ML (ref 0.45–4.5)
VIT B12 SERPL-MCNC: 760 PG/ML (ref 100–900)
WBC # BLD AUTO: 13.51 THOUSAND/UL (ref 4.31–10.16)

## 2022-04-15 PROCEDURE — 83735 ASSAY OF MAGNESIUM: CPT | Performed by: NURSE PRACTITIONER

## 2022-04-15 PROCEDURE — 36415 COLL VENOUS BLD VENIPUNCTURE: CPT | Performed by: NURSE PRACTITIONER

## 2022-04-15 PROCEDURE — 85025 COMPLETE CBC W/AUTO DIFF WBC: CPT | Performed by: NURSE PRACTITIONER

## 2022-04-15 PROCEDURE — 82607 VITAMIN B-12: CPT

## 2022-04-15 PROCEDURE — 84443 ASSAY THYROID STIM HORMONE: CPT

## 2022-04-15 PROCEDURE — 80053 COMPREHEN METABOLIC PANEL: CPT | Performed by: NURSE PRACTITIONER

## 2022-04-15 PROCEDURE — 83540 ASSAY OF IRON: CPT

## 2022-04-15 PROCEDURE — 82746 ASSAY OF FOLIC ACID SERUM: CPT

## 2022-04-15 PROCEDURE — 82306 VITAMIN D 25 HYDROXY: CPT

## 2022-04-15 PROCEDURE — 83550 IRON BINDING TEST: CPT

## 2022-04-15 PROCEDURE — 82728 ASSAY OF FERRITIN: CPT

## 2022-04-15 RX ORDER — SODIUM CHLORIDE 9 MG/ML
20 INJECTION, SOLUTION INTRAVENOUS ONCE
Status: CANCELLED | OUTPATIENT
Start: 2022-04-18

## 2022-04-15 NOTE — TELEPHONE ENCOUNTER
Pt is aware this date is pending cardiac clearance from Dr Neyda Tariq      Verified patient's insurance   CONFIRMED - Patient's insurance is Medicare  Is patient requesting a call when authorization has been obtained? Patient did not request a call  Surgery Date: 5/18/22 - to be scheduled during pt's chemo break between 5/17-5/29  Primary Surgeon: Emma Karimi // Brett Peña (NPI: [id number])  Assisting Surgeon: Not Applicable (N/A)  Facility: [facility] (Tax: [id number] / NPI: [id number])  Inpatient / Outpatient: Outpatient  Level: 2    Clearance Received: Yes, Pt has appt for CC 4/29/22 with Dr Neyda Tariq  Consent Received: Yes, scanned into Epic on 4/12/22  Medication Hold / Last Dose: Hold on Eliquis 2 days prior  Last dose 5/15/22  VQI Spreadsheet: 4/15/22  IR Notified: Not Applicable (N/A)  Rep   Notified: Not Applicable (N/A)  Equipment Needs: Not Applicable (N/A)  Vas Lab Requested: Not Applicable (N/A)  Patient Contacted: 4/15/22    Diagnosis: N18 32  Procedure/ CPT Code(s): (AV) Arteriovenous Fistula // CPT: 48896    For varicose vein related procedures:   Last LEVDR: Not Applicable (N/A)  CEAP Classification: Not Applicable (N/A)  VCSS: Not Applicable (N/A)    Post Operative Date/ Time: 6/2/22 , 2:00pm Javi with JAYME Graham (NPI: [id number])     Pt will have his blood work and ekg done at [facility] has appt with Dr Neyda Tariq 4/29/22 for cardiac clearance

## 2022-04-15 NOTE — TELEPHONE ENCOUNTER
Called and left patient message  Was told that his iron stores are declining iron saturation 14% will check with infusions Monday morning to see if we can give him 1 dose of IV Venofer  He also has hypokalemia potassium 3 2 will start him on K dur 20 meq daily script sent to his local pharmacy  Also notified patient that his kidney function is slightly worse creatinine 4 21, GFR 13 which will need to be monitored closely  Will see if infusions can draw repeat BMP Monday  Encouraged patient or his wife to call back should they have any further questions or concerns regarding his updated laboratory studies

## 2022-04-15 NOTE — TELEPHONE ENCOUNTER
Phoned pt's wife Nathan Saundesr and left a voice message asking when Brennan's dialysis shunt was to be placed so we can schedule chemo accordingly  I left my contact number here at [address] [place] [address]

## 2022-04-18 ENCOUNTER — HOSPITAL ENCOUNTER (OUTPATIENT)
Dept: INFUSION CENTER | Facility: HOSPITAL | Age: 67
Discharge: HOME | End: 2022-04-18
Attending: INTERNAL MEDICINE
Payer: MEDICARE

## 2022-04-18 ENCOUNTER — TELEPHONE (OUTPATIENT)
Dept: CARDIOLOGY CLINIC | Facility: CLINIC | Age: 67
End: 2022-04-18

## 2022-04-18 VITALS
HEIGHT: 71 IN | DIASTOLIC BLOOD PRESSURE: 71 MMHG | HEART RATE: 86 BPM | WEIGHT: 203.26 LBS | TEMPERATURE: 96.9 F | BODY MASS INDEX: 28.46 KG/M2 | SYSTOLIC BLOOD PRESSURE: 138 MMHG | OXYGEN SATURATION: 91 % | RESPIRATION RATE: 18 BRPM

## 2022-04-18 DIAGNOSIS — D70.1 CHEMOTHERAPY-INDUCED NEUTROPENIA (HCC): ICD-10-CM

## 2022-04-18 DIAGNOSIS — D50.8 OTHER IRON DEFICIENCY ANEMIAS: ICD-10-CM

## 2022-04-18 DIAGNOSIS — T45.1X5A CHEMOTHERAPY-INDUCED NEUTROPENIA (HCC): ICD-10-CM

## 2022-04-18 DIAGNOSIS — D50.9 IRON DEFICIENCY ANEMIA, UNSPECIFIED IRON DEFICIENCY ANEMIA TYPE: ICD-10-CM

## 2022-04-18 DIAGNOSIS — N18.4 STAGE 4 CHRONIC KIDNEY DISEASE (HCC): ICD-10-CM

## 2022-04-18 DIAGNOSIS — C18.7 MALIGNANT NEOPLASM OF SIGMOID COLON (HCC): Primary | ICD-10-CM

## 2022-04-18 PROCEDURE — 96367 TX/PROPH/DG ADDL SEQ IV INF: CPT

## 2022-04-18 PROCEDURE — 96409 CHEMO IV PUSH SNGL DRUG: CPT

## 2022-04-18 PROCEDURE — 96366 THER/PROPH/DIAG IV INF ADDON: CPT

## 2022-04-18 RX ORDER — SODIUM CHLORIDE 9 MG/ML
20 INJECTION, SOLUTION INTRAVENOUS ONCE
Status: COMPLETED | OUTPATIENT
Start: 2022-04-18 | End: 2022-04-18

## 2022-04-18 RX ORDER — DEXTROSE MONOHYDRATE 50 MG/ML
20 INJECTION, SOLUTION INTRAVENOUS ONCE
Status: DISCONTINUED | OUTPATIENT
Start: 2022-04-18 | End: 2022-04-22 | Stop reason: HOSPADM

## 2022-04-18 RX ORDER — SODIUM CHLORIDE 9 MG/ML
20 INJECTION, SOLUTION INTRAVENOUS ONCE
Status: CANCELLED | OUTPATIENT
Start: 2022-04-18

## 2022-04-18 RX ORDER — FLUOROURACIL 50 MG/ML
275 INJECTION, SOLUTION INTRAVENOUS ONCE
Status: COMPLETED | OUTPATIENT
Start: 2022-04-18 | End: 2022-04-18

## 2022-04-18 RX ORDER — SODIUM CHLORIDE 9 MG/ML
20 INJECTION, SOLUTION INTRAVENOUS ONCE AS NEEDED
Status: DISCONTINUED | OUTPATIENT
Start: 2022-04-18 | End: 2022-04-22 | Stop reason: HOSPADM

## 2022-04-18 RX ADMIN — FLUOROURACIL 565 MG: 50 INJECTION, SOLUTION INTRAVENOUS at 15:41

## 2022-04-18 RX ADMIN — SODIUM CHLORIDE 20 ML/HR: 9 INJECTION, SOLUTION INTRAVENOUS at 12:13

## 2022-04-18 RX ADMIN — SODIUM CHLORIDE 200 MG: 9 INJECTION, SOLUTION INTRAVENOUS at 12:15

## 2022-04-18 RX ADMIN — LEUCOVORIN CALCIUM 550 MG: 500 INJECTION, POWDER, LYOPHILIZED, FOR SOLUTION INTRAMUSCULAR; INTRAVENOUS at 13:33

## 2022-04-18 RX ADMIN — DEXAMETHASONE SODIUM PHOSPHATE: 10 INJECTION, SOLUTION INTRAMUSCULAR; INTRAVENOUS at 12:56

## 2022-04-18 NOTE — PLAN OF CARE
Problem: INFECTION - ADULT  Goal: Absence or prevention of progression during hospitalization  Description: INTERVENTIONS:  - Assess and monitor for signs and symptoms of infection  - Monitor lab/diagnostic results  - Monitor all insertion sites, i e  indwelling lines, tubes, and drains  - Monitor endotracheal if appropriate and nasal secretions for changes in amount and color  - [place] appropriate cooling/warming therapies per order  - Administer medications as ordered  - Instruct and encourage patient and family to use good hand hygiene technique  - Identify and instruct in appropriate isolation precautions for identified infection/condition  Outcome: Progressing     Problem: Knowledge Deficit  Goal: Patient/family/caregiver demonstrates understanding of disease process, treatment plan, medications, and discharge instructions  Description: Complete learning assessment and assess knowledge base    Interventions:  - Provide teaching at level of understanding  - Provide teaching via preferred learning methods  Outcome: Progressing

## 2022-04-18 NOTE — TELEPHONE ENCOUNTER
Authorization requirements reviewed  Please refer to Grzegorz Melo / Sydney Cheney number [id number] for case updates

## 2022-04-19 NOTE — ASSESSMENT & PLAN NOTE
· Secondary to acute on chronic diastolic congestive heart failure, bilateral pleural effusion, atelectasis, valvular heart disease, recent pneumonia  · Patient required BiPAP in the ER on 5/15 and improved with IV Lasix  · Currently on O2 via nasal cannula   · Wean down as tolerated INTERVAL HPI/OVERNIGHT EVENTS:    Patient is a 64y old  Female who presents with a chief complaint of CHEST PAIN  cardiac cath today.    FSG & insulin:  Yesterday:  Dinner . Lispro 6+6.  Bedtime . Lantus 37 + lispro 2.  Today:  Breakfast . Lispro 2. NPO.  Lunch       Pt reports the following symptoms:    CONSTITUTIONAL:  Negative fever or chills, feels well, good appetite  EYES:  Negative  blurry vision or double vision  CARDIOVASCULAR:  Negative for chest pain or palpitations  RESPIRATORY:  Negative for cough, wheezing, or SOB   GASTROINTESTINAL:  Negative for nausea, vomiting, diarrhea, constipation, or abdominal pain  GENITOURINARY:  Negative frequency, urgency or dysuria  NEUROLOGIC:  No headache, confusion, dizziness, lightheadedness        Pt reports the following symptoms:    CONSTITUTIONAL:  Negative fever or chills, feels well, good appetite  EYES:  Negative  blurry vision or double vision  CARDIOVASCULAR:  Negative for chest pain or palpitations  RESPIRATORY:  Negative for cough, wheezing, or SOB   GASTROINTESTINAL:  Negative for nausea, vomiting, diarrhea, constipation, or abdominal pain  GENITOURINARY:  Negative frequency, urgency or dysuria  NEUROLOGIC:  No headache, confusion, dizziness, lightheadedness    MEDICATIONS  (STANDING):  aspirin enteric coated 81 milliGRAM(s) Oral daily  atorvastatin 80 milliGRAM(s) Oral at bedtime  chlorhexidine 4% Liquid 1 Application(s) Topical once  dextrose 5%. 1000 milliLiter(s) (50 mL/Hr) IV Continuous <Continuous>  dextrose 50% Injectable 25 Gram(s) IV Push once  glucagon  Injectable 1 milliGRAM(s) IntraMuscular once  insulin glargine Injectable (LANTUS) 37 Unit(s) SubCutaneous at bedtime  insulin lispro (ADMELOG) corrective regimen sliding scale   SubCutaneous Before meals and at bedtime  insulin lispro Injectable (ADMELOG) 6 Unit(s) SubCutaneous three times a day before meals  isosorbide   mononitrate ER Tablet (IMDUR) 30 milliGRAM(s) Oral daily  levothyroxine 125 MICROGram(s) Oral daily  pantoprazole    Tablet 40 milliGRAM(s) Oral before breakfast  PARoxetine 40 milliGRAM(s) Oral daily  potassium chloride    Tablet ER 20 milliEquivalent(s) Oral once  sodium chloride 0.9%. 1000 milliLiter(s) (75 mL/Hr) IV Continuous <Continuous>    MEDICATIONS  (PRN):  dextrose Oral Gel 15 Gram(s) Oral once PRN Blood Glucose LESS THAN 70 milliGRAM(s)/deciliter      PHYSICAL EXAM  Vital Signs Last 24 Hrs  T(C): 36.3 (19 Apr 2022 10:35), Max: 37.1 (18 Apr 2022 21:56)  T(F): 97.3 (19 Apr 2022 10:35), Max: 98.7 (18 Apr 2022 21:56)  HR: 61 (19 Apr 2022 12:00) (61 - 71)  BP: 110/57 (19 Apr 2022 12:00) (97/54 - 126/60)  BP(mean): 78 (19 Apr 2022 12:00) (72 - 78)  RR: 17 (19 Apr 2022 12:00) (16 - 17)  SpO2: 97% (19 Apr 2022 12:00) (95% - 98%)    Constitutional: wn/wd in NAD.   HEENT: NCAT, MMM, OP clear, EOMI, no proptosis or lid retraction  Neck: no thyromegaly or palpable thyroid nodules   Respiratory: lungs CTAB.  Cardiovascular: regular rhythm, normal S1 and S2, no audible murmurs, no peripheral edema  GI: soft, NT/ND, no masses/HSM appreciated.  Neurology: no tremors, DTR 2+  Skin: no visible rashes/lesions. no acanthosis nigricans. no lipohypertrophy. no cushing's stigmata.  Psychiatric: AAO x 3, normal affect/mood.    LABS:                        12.1   5.92  )-----------( 204      ( 19 Apr 2022 09:25 )             36.6     04-19    141  |  106  |  9   ----------------------------<  154<H>  3.4<L>   |  24  |  0.59    Ca    8.6      19 Apr 2022 09:25  Mg     2.1     04-19      PT/INR - ( 19 Apr 2022 09:25 )   PT: 12.1 sec;   INR: 1.02          PTT - ( 19 Apr 2022 09:25 )  PTT:34.6 sec    Thyroid Stimulating Hormone, Serum: 0.221 uIU/mL (04-17 @ 06:41)  Thyroid Stimulating Hormone, Serum: <0.118 uIU/mL (04-15 @ 19:05)      HbA1C:   CAPILLARY BLOOD GLUCOSE      POCT Blood Glucose.: 109 mg/dL (19 Apr 2022 11:19)  POCT Blood Glucose.: 171 mg/dL (19 Apr 2022 06:38)  POCT Blood Glucose.: 166 mg/dL (18 Apr 2022 21:39)  POCT Blood Glucose.: 286 mg/dL (18 Apr 2022 17:27)

## 2022-04-22 ENCOUNTER — TELEPHONE (OUTPATIENT)
Dept: INFUSION CENTER | Facility: HOSPITAL | Age: 67
End: 2022-04-22

## 2022-04-22 ENCOUNTER — APPOINTMENT (OUTPATIENT)
Dept: LAB | Facility: CLINIC | Age: 67
DRG: 674 | End: 2022-04-22
Payer: MEDICARE

## 2022-04-22 ENCOUNTER — HOSPITAL ENCOUNTER (INPATIENT)
Facility: HOSPITAL | Age: 67
LOS: 5 days | Discharge: HOME | DRG: 674 | End: 2022-04-27
Attending: EMERGENCY MEDICINE | Admitting: INTERNAL MEDICINE
Payer: MEDICARE

## 2022-04-22 ENCOUNTER — TELEPHONE (OUTPATIENT)
Dept: HEMATOLOGY ONCOLOGY | Facility: CLINIC | Age: 67
End: 2022-04-22

## 2022-04-22 DIAGNOSIS — Z89.422 S/P AMPUTATION OF LESSER TOE, LEFT (HCC): ICD-10-CM

## 2022-04-22 DIAGNOSIS — N17.9 ACUTE KIDNEY INJURY SUPERIMPOSED ON CHRONIC KIDNEY DISEASE (HCC): ICD-10-CM

## 2022-04-22 DIAGNOSIS — D64.9 NORMOCYTIC ANEMIA: ICD-10-CM

## 2022-04-22 DIAGNOSIS — Z85.038 PERSONAL HISTORY OF COLON CANCER: ICD-10-CM

## 2022-04-22 DIAGNOSIS — M86.9 OSTEOMYELITIS OF LEFT FOOT, UNSPECIFIED TYPE (HCC): ICD-10-CM

## 2022-04-22 DIAGNOSIS — D72.828 OTHER ELEVATED WHITE BLOOD CELL (WBC) COUNT: ICD-10-CM

## 2022-04-22 DIAGNOSIS — N18.9 ACUTE KIDNEY INJURY SUPERIMPOSED ON CHRONIC KIDNEY DISEASE (HCC): ICD-10-CM

## 2022-04-22 DIAGNOSIS — D64.9 ANEMIA, UNSPECIFIED TYPE: ICD-10-CM

## 2022-04-22 DIAGNOSIS — N17.9 AKI (ACUTE KIDNEY INJURY) (HCC): Primary | ICD-10-CM

## 2022-04-22 DIAGNOSIS — D72.829 LEUKOCYTOSIS, UNSPECIFIED TYPE: ICD-10-CM

## 2022-04-22 DIAGNOSIS — C18.7 MALIGNANT NEOPLASM OF SIGMOID COLON (HCC): ICD-10-CM

## 2022-04-22 DIAGNOSIS — Z86.79 HISTORY OF CHF (CONGESTIVE HEART FAILURE): ICD-10-CM

## 2022-04-22 DIAGNOSIS — Z89.421 S/P AMPUTATION OF LESSER TOE, RIGHT (HCC): ICD-10-CM

## 2022-04-22 LAB
ALBUMIN SERPL BCP-MCNC: 3 G/DL (ref 3.5–5)
ALBUMIN SERPL BCP-MCNC: 3.2 G/DL (ref 3.5–5)
ALP SERPL-CCNC: 109 U/L (ref 46–116)
ALP SERPL-CCNC: 129 U/L (ref 46–116)
ALT SERPL W P-5'-P-CCNC: 22 U/L (ref 12–78)
ALT SERPL W P-5'-P-CCNC: 25 U/L (ref 12–78)
ANION GAP SERPL CALCULATED.3IONS-SCNC: 11 MMOL/L (ref 4–13)
ANION GAP SERPL CALCULATED.3IONS-SCNC: 12 MMOL/L (ref 4–13)
AST SERPL W P-5'-P-CCNC: 15 U/L (ref 5–45)
AST SERPL W P-5'-P-CCNC: 16 U/L (ref 5–45)
BASOPHILS # BLD AUTO: 0.05 THOUSANDS/ΜL (ref 0–0.1)
BASOPHILS # BLD AUTO: 0.06 THOUSANDS/ΜL (ref 0–0.1)
BASOPHILS NFR BLD AUTO: 0 % (ref 0–1)
BASOPHILS NFR BLD AUTO: 0 % (ref 0–1)
BILIRUB SERPL-MCNC: 0.77 MG/DL (ref 0.2–1)
BILIRUB SERPL-MCNC: 1.02 MG/DL (ref 0.2–1)
BUN SERPL-MCNC: 136 MG/DL (ref 5–25)
BUN SERPL-MCNC: 136 MG/DL (ref 5–25)
CALCIUM ALBUM COR SERPL-MCNC: 10 MG/DL (ref 8.3–10.1)
CALCIUM ALBUM COR SERPL-MCNC: 9.6 MG/DL (ref 8.3–10.1)
CALCIUM SERPL-MCNC: 9 MG/DL (ref 8.3–10.1)
CALCIUM SERPL-MCNC: 9.2 MG/DL (ref 8.3–10.1)
CEA SERPL-MCNC: 1.5 NG/ML (ref 0–3)
CHLORIDE SERPL-SCNC: 95 MMOL/L (ref 100–108)
CHLORIDE SERPL-SCNC: 95 MMOL/L (ref 100–108)
CO2 SERPL-SCNC: 22 MMOL/L (ref 21–32)
CO2 SERPL-SCNC: 22 MMOL/L (ref 21–32)
CREAT SERPL-MCNC: 6.51 MG/DL (ref 0.6–1.3)
CREAT SERPL-MCNC: 6.76 MG/DL (ref 0.6–1.3)
CRP SERPL QL: 6.7 MG/L
EOSINOPHIL # BLD AUTO: 0.13 THOUSAND/ΜL (ref 0–0.61)
EOSINOPHIL # BLD AUTO: 0.15 THOUSAND/ΜL (ref 0–0.61)
EOSINOPHIL NFR BLD AUTO: 1 % (ref 0–6)
EOSINOPHIL NFR BLD AUTO: 1 % (ref 0–6)
ERYTHROCYTE [DISTWIDTH] IN BLOOD BY AUTOMATED COUNT: 15.2 % (ref 11.6–15.1)
ERYTHROCYTE [DISTWIDTH] IN BLOOD BY AUTOMATED COUNT: 15.6 % (ref 11.6–15.1)
ERYTHROCYTE [SEDIMENTATION RATE] IN BLOOD: 36 MM/HOUR (ref 0–19)
GFR SERPL CREATININE-BSD FRML MDRD: 7 ML/MIN/1.73SQ M
GFR SERPL CREATININE-BSD FRML MDRD: 7 ML/MIN/1.73SQ M
GLUCOSE P FAST SERPL-MCNC: 140 MG/DL (ref 65–99)
GLUCOSE SERPL-MCNC: 233 MG/DL (ref 65–140)
HCT VFR BLD AUTO: 29.3 % (ref 36.5–49.3)
HCT VFR BLD AUTO: 29.4 % (ref 36.5–49.3)
HGB BLD-MCNC: 10.1 G/DL (ref 12–17)
HGB BLD-MCNC: 9.8 G/DL (ref 12–17)
IMM GRANULOCYTES # BLD AUTO: 0.08 THOUSAND/UL (ref 0–0.2)
IMM GRANULOCYTES # BLD AUTO: 0.1 THOUSAND/UL (ref 0–0.2)
IMM GRANULOCYTES NFR BLD AUTO: 1 % (ref 0–2)
IMM GRANULOCYTES NFR BLD AUTO: 1 % (ref 0–2)
LYMPHOCYTES # BLD AUTO: 0.76 THOUSANDS/ΜL (ref 0.6–4.47)
LYMPHOCYTES # BLD AUTO: 0.96 THOUSANDS/ΜL (ref 0.6–4.47)
LYMPHOCYTES NFR BLD AUTO: 6 % (ref 14–44)
LYMPHOCYTES NFR BLD AUTO: 8 % (ref 14–44)
MAGNESIUM SERPL-MCNC: 2.3 MG/DL (ref 1.6–2.6)
MCH RBC QN AUTO: 31.1 PG (ref 26.8–34.3)
MCH RBC QN AUTO: 31.2 PG (ref 26.8–34.3)
MCHC RBC AUTO-ENTMCNC: 33.3 G/DL (ref 31.4–37.4)
MCHC RBC AUTO-ENTMCNC: 34.5 G/DL (ref 31.4–37.4)
MCV RBC AUTO: 90 FL (ref 82–98)
MCV RBC AUTO: 94 FL (ref 82–98)
MONOCYTES # BLD AUTO: 1.08 THOUSAND/ΜL (ref 0.17–1.22)
MONOCYTES # BLD AUTO: 1.3 THOUSAND/ΜL (ref 0.17–1.22)
MONOCYTES NFR BLD AUTO: 11 % (ref 4–12)
MONOCYTES NFR BLD AUTO: 8 % (ref 4–12)
NEUTROPHILS # BLD AUTO: 11.52 THOUSANDS/ΜL (ref 1.85–7.62)
NEUTROPHILS # BLD AUTO: 9.39 THOUSANDS/ΜL (ref 1.85–7.62)
NEUTS SEG NFR BLD AUTO: 79 % (ref 43–75)
NEUTS SEG NFR BLD AUTO: 84 % (ref 43–75)
NRBC BLD AUTO-RTO: 0 /100 WBCS
NRBC BLD AUTO-RTO: 0 /100 WBCS
PLATELET # BLD AUTO: 320 THOUSANDS/UL (ref 149–390)
PLATELET # BLD AUTO: 350 THOUSANDS/UL (ref 149–390)
PMV BLD AUTO: 10 FL (ref 8.9–12.7)
PMV BLD AUTO: 10.6 FL (ref 8.9–12.7)
POTASSIUM SERPL-SCNC: 3.6 MMOL/L (ref 3.5–5.3)
POTASSIUM SERPL-SCNC: 3.7 MMOL/L (ref 3.5–5.3)
PROT SERPL-MCNC: 7 G/DL (ref 6.4–8.2)
PROT SERPL-MCNC: 7.3 G/DL (ref 6.4–8.2)
RBC # BLD AUTO: 3.14 MILLION/UL (ref 3.88–5.62)
RBC # BLD AUTO: 3.25 MILLION/UL (ref 3.88–5.62)
SODIUM SERPL-SCNC: 128 MMOL/L (ref 136–145)
SODIUM SERPL-SCNC: 129 MMOL/L (ref 136–145)
WBC # BLD AUTO: 11.93 THOUSAND/UL (ref 4.31–10.16)
WBC # BLD AUTO: 13.65 THOUSAND/UL (ref 4.31–10.16)

## 2022-04-22 PROCEDURE — 99223 1ST HOSP IP/OBS HIGH 75: CPT | Performed by: INTERNAL MEDICINE

## 2022-04-22 PROCEDURE — 82948 REAGENT STRIP/BLOOD GLUCOSE: CPT

## 2022-04-22 PROCEDURE — 85025 COMPLETE CBC W/AUTO DIFF WBC: CPT

## 2022-04-22 PROCEDURE — 85652 RBC SED RATE AUTOMATED: CPT

## 2022-04-22 PROCEDURE — 99285 EMERGENCY DEPT VISIT HI MDM: CPT

## 2022-04-22 PROCEDURE — 80053 COMPREHEN METABOLIC PANEL: CPT | Performed by: INTERNAL MEDICINE

## 2022-04-22 PROCEDURE — 86140 C-REACTIVE PROTEIN: CPT

## 2022-04-22 PROCEDURE — 99284 EMERGENCY DEPT VISIT MOD MDM: CPT | Performed by: EMERGENCY MEDICINE

## 2022-04-22 PROCEDURE — 83735 ASSAY OF MAGNESIUM: CPT

## 2022-04-22 PROCEDURE — 85025 COMPLETE CBC W/AUTO DIFF WBC: CPT | Performed by: INTERNAL MEDICINE

## 2022-04-22 PROCEDURE — 82378 CARCINOEMBRYONIC ANTIGEN: CPT

## 2022-04-22 PROCEDURE — 36415 COLL VENOUS BLD VENIPUNCTURE: CPT

## 2022-04-22 PROCEDURE — 80053 COMPREHEN METABOLIC PANEL: CPT

## 2022-04-22 RX ORDER — PRAVASTATIN SODIUM 40 MG
40 TABLET ORAL
Status: DISCONTINUED | OUTPATIENT
Start: 2022-04-23 | End: 2022-04-27 | Stop reason: HOSPADM

## 2022-04-22 RX ORDER — ONDANSETRON 2 MG/ML
4 INJECTION INTRAMUSCULAR; INTRAVENOUS EVERY 6 HOURS PRN
Status: DISCONTINUED | OUTPATIENT
Start: 2022-04-22 | End: 2022-04-27 | Stop reason: HOSPADM

## 2022-04-22 RX ORDER — INSULIN GLARGINE 100 [IU]/ML
10 INJECTION, SOLUTION SUBCUTANEOUS
Status: DISCONTINUED | OUTPATIENT
Start: 2022-04-22 | End: 2022-04-26

## 2022-04-22 RX ORDER — METOPROLOL TARTRATE 50 MG/1
50 TABLET, FILM COATED ORAL EVERY 12 HOURS SCHEDULED
Status: DISCONTINUED | OUTPATIENT
Start: 2022-04-23 | End: 2022-04-27 | Stop reason: HOSPADM

## 2022-04-22 RX ORDER — TAMSULOSIN HYDROCHLORIDE 0.4 MG/1
0.4 CAPSULE ORAL
Status: DISCONTINUED | OUTPATIENT
Start: 2022-04-23 | End: 2022-04-27 | Stop reason: HOSPADM

## 2022-04-22 RX ORDER — SODIUM BICARBONATE 650 MG/1
650 TABLET ORAL
Status: DISCONTINUED | OUTPATIENT
Start: 2022-04-22 | End: 2022-04-25

## 2022-04-22 RX ADMIN — INSULIN GLARGINE 10 UNITS: 100 INJECTION, SOLUTION SUBCUTANEOUS at 23:17

## 2022-04-22 RX ADMIN — SODIUM BICARBONATE 650 MG TABLET 650 MG: at 23:17

## 2022-04-22 NOTE — PROGRESS NOTES
Reviewed labs from earlier today  Creatinine worsening to 6 7 with   Metolazone was recently increased by Dr Darya Cosme on 4/7 to twice a week given significant LE edema  No weight loss since that time and patient thinks his edema is actually a little worse  Feeling fatigue but no chest pain, shortness of breath, nausea, vomiting or diarrhea  Recommended patient come to ER to be evaluated  Patient agrees and will come to ER

## 2022-04-22 NOTE — TELEPHONE ENCOUNTER
Teams  to Carl Rodriguez regarding patients Creat of 6 76  Patient is due for treatment on Monday, awaiting response back if we are proceeding on Monday or holding treatment

## 2022-04-22 NOTE — TELEPHONE ENCOUNTER
Phoned pt's wife Jayda Carpenter and let her know about pt's increased creatinine level  Decision has been made to hold chemo on Mon, forwarded labs to pts nephrologist and wife has also sent a My Chart message to Nephrologist office  Jayda Carpenter will message me in My Chart if pt has to be hospitalized

## 2022-04-22 NOTE — ED PROVIDER NOTES
History  Chief Complaint   Patient presents with    Evaluation of Abnormal Diagnostic Test     pt was called to come to ED after creatinine came back as 6 7     HPI  12-year-old man with history of sigmoid cancer undergoing active chemotherapy, chronic kidney disease stage IV, CHF, paroxysmal atrial fibrillation, obstructive sleep apnea, hypertension and diabetes type 2 presents to ED for evaluation of abnormal labs  Patient reports she is creatinine has been rising after he eats has been taking metolazone a few times a week  He reports he feels fatigued  He otherwise denies any other symptoms or physical complaints  Patient denies headache, visual changes, dizziness, fevers, chills, chest pain, palpitations, abdominal pain, diarrhea, melena, hematochezia, dysuria, new skin rashes or numbness or tingling of the extremities  He reports his last chemo session was on Monday  He reports he is waiting to have a av fistula placed in his left upper extremity for future dialysis  He currently does not receive dialysis  Prior to Admission Medications   Prescriptions Last Dose Informant Patient Reported? Taking? Dupilumab (Dupixent) 300 MG/2ML SOPN  Self Yes No   Sig: Inject 300 mg under the skin Once every 2 weeks   Insulin Degludec-Liraglutide (Xultophy) 100 units-3 6 mg/mL injection pen  Self No No   Sig: Inject 19 units daily     Insulin Pen Needle (BD Pen Needle Zenaida U/F) 32G X 4 MM MISC  Self No No   Sig: Use 1 daily   Multiple Vitamin (MULTIVITAMIN) tablet  Self Yes No   Sig: Take 1 tablet by mouth daily IN AM   OneTouch Ultra test strip  Self No No   Sig: USE TO TEST BLOOD SUGAR 3 TIMES A DAY   allopurinol (ZYLOPRIM) 300 mg tablet  Self No No   Sig: Take 1 tablet (300 mg total) by mouth every morning   amLODIPine (NORVASC) 10 mg tablet  Self No No   Sig: Take 1 tablet (10 mg total) by mouth daily   apixaban (ELIQUIS) 5 mg  Self No No   Sig: Take 1 tablet (5 mg total) by mouth every 12 (twelve) hours famotidine (PEPCID) 40 MG tablet  Self No No   Sig: TAKE 1 TABLET BY MOUTH EVERY DAY   metolazone (ZAROXOLYN) 10 mg tablet  Self No No   Sig: Take 1 tablet (10 mg total) by mouth once a week   metoprolol tartrate (LOPRESSOR) 50 mg tablet  Self No No   Sig: Take 1 tablet (50 mg total) by mouth every 12 (twelve) hours   omega-3-acid ethyl esters (LOVAZA) 1 g capsule  Self No No   Sig: Take 2 capsules (2 g total) by mouth 2 (two) times a day Mail print prescription to pt   ondansetron (ZOFRAN) 8 mg tablet  Self No No   Sig: Take 1 tablet (8 mg total) by mouth every 8 (eight) hours as needed for nausea or vomiting   potassium chloride (K-DUR,KLOR-CON) 10 mEq tablet   No No   Sig: Take 2 tablets (20 mEq total) by mouth daily   simvastatin (ZOCOR) 20 mg tablet  Self No No   Sig: Take 1 tablet (20 mg total) by mouth daily at bedtime   sodium bicarbonate 650 mg tablet   No No   Sig: Take 1 tablet (650 mg total) by mouth 2 (two) times daily after meals   tamsulosin (FLOMAX) 0 4 mg  Self No No   Sig: TAKE 1 CAPSULE BY MOUTH EVERY DAY WITH DINNER   torsemide (DEMADEX) 20 mg tablet  Self No No   Sig: Take 2 tablets (40 mg total) by mouth 2 (two) times a day 40mg am and 20mg pm      Facility-Administered Medications: None       Past Medical History:   Diagnosis Date    Anemia     iron def    Arthritis     OA    Bruise of both arms     forearms and both hands    Bruises easily     Cancer ([facility] [address] ) 09/01/2021    colon    CHF (congestive heart failure) (HCC)     Chronic kidney disease     CPAP (continuous positive airway pressure) dependence     Diabetes mellitus (HCC)     Diabetic foot ulcer (HCC)     Eczema     Erectile dysfunction     Gout     Heart murmur     History of permanent cardiac pacemaker placement 11/2018    Hyperlipidemia     Hypertension     Hypoglycemia 3/8/2019    Murmur     Nephropathy     Osteoarthritis     Pacemaker 11/06/2018    PAD (peripheral artery disease) (HCC)     Seasonal allergies  Sleep apnea     Toe infection     bilat great toes    Vertigo     Walks frequently     Wears dentures     upper    Wears glasses        Past Surgical History:   Procedure Laterality Date    CARDIAC PACEMAKER PLACEMENT      CARPAL TUNNEL RELEASE Left     CARPAL TUNNEL RELEASE Right     CARPAL TUNNEL RELEASE      COLONOSCOPY  08/2020    COLONOSCOPY      FL GUIDED CENTRAL VENOUS ACCESS DEVICE INSERTION  1/11/2022    FOOT AMPUTATION Left 2/10/2020    Procedure: PARTIAL 1ST RAY AMPUTATION;  Surgeon: Melba Guerra DPM;  [facility];  Service: Podiatry    INCISION AND DRAINAGE OF WOUND Left 1/12/2020    Procedure: INCISION AND DRAINAGE (I&D) EXTREMITY AND REMOVAL OF SESMOID BONE;  Surgeon: Yenny Casas DPM;  [facility];  Service: Podiatry    IR OTHER  1/21/2022    IR PICC REPOSITION  1/14/2020    KNEE ARTHROSCOPY Left     KNEE ARTHROSCOPY Right     KNEE SURGERY      GA AMPUTATION TOE,I-P JT Bilateral 5/2/2017    Procedure: PARTIAL AMPUTATION RIGHT AND LEFT HALLUX ;  Surgeon: Melba Guerra DPM;  [facility];  Service: Podiatry    GA AMPUTATION TOE,MT-P JT Left 7/25/2017    Procedure: 2ND TOE AMPUTATION;  Surgeon: Melba Guerra DPM;  [facility];  Service: Podiatry    GA INSJ TUNNELED CTR VAD W/SUBQ PORT AGE 5 YR/> N/A 1/11/2022    Procedure: INSERTION VENOUS PORT ( PORT-A-CATH) IR;  Surgeon: Alvarez Jackson DO;  [facility];  Service: Interventional Radiology    RESECTION LOW ANTERIOR LAPAROSCOPIC N/A 10/21/2021    Procedure: RESECTION LOW ANTERIOR LAPAROSCOPIC;  Surgeon: Ryan Goel MD;  [facility];  Service: Colorectal    SHOULDER ARTHROSCOPY Left     with screws,RTC    SHOULDER SURGERY      TOE AMPUTATION Left 1/8/2020    Procedure: HALLUX AMPUTATION;  Surgeon: Yenny Casas DPM;  [facility];  Service: Podiatry    UPPER GASTROINTESTINAL ENDOSCOPY  03/2020    Intestinal metaplasia prepyloric    VASECTOMY Family History   Problem Relation Age of Onset    Heart disease Father         passed away    Hypertension Father     Pulmonary embolism Father     Hypertension Mother         assed away     I have reviewed and agree with the history as documented  E-Cigarette/Vaping    E-Cigarette Use Never User      E-Cigarette/Vaping Substances    Nicotine No     THC No     CBD No     Flavoring No     Other No     Unknown No      Social History     Tobacco Use    Smoking status: Former Smoker     Packs/day: 0 50     Years: 20 00     Pack years: 10 00     Types: Cigarettes     Start date: 1     Quit date:      Years since quittin 3    Smokeless tobacco: Never Used    Tobacco comment: quit 10 years ago   Vaping Use    Vaping Use: Never used   Substance Use Topics    Alcohol use: Never     Alcohol/week: 0 0 standard drinks    Drug use: No        Review of Systems   All other systems reviewed and are negative  Physical Exam  ED Triage Vitals [22]   Temperature Pulse Respirations Blood Pressure SpO2   97 7 °F (36 5 °C) 89 16 140/64 93 %      Temp src Heart Rate Source Patient Position - Orthostatic VS BP Location FiO2 (%)   -- -- -- -- --      Pain Score       --             Orthostatic Vital Signs  Vitals:    22   BP: 140/64   Pulse: 89       Physical Exam   PHYSICAL EXAM    Constitutional:  Well developed, well nourished, no acute distress, non-toxic appearance    HEENT:  Conjunctiva normal  Oropharynx moist  Respiratory:  No respiratory distress, normal breath sounds  Cardiovascular:  Normal rate, normal rhythm, no murmurs  GI:  Soft, nondistended, normal bowel sounds, nontender  :  No costovertebral angle tenderness   Musculoskeletal:  No edema, no tenderness, no deformities     Integument:  Well hydrated, no rash   Lymphatic:  Stockings over bilateral lower extremity  Neurologic:  Alert & oriented, normal motor function, normal sensory function, no focal deficits noted   Psychiatric:  Speech and behavior appropriate       ED Medications  Medications - No data to display    Diagnostic Studies  Results Reviewed     Procedure Component Value Units Date/Time    Comprehensive metabolic panel [[id number]]  (Abnormal) Collected: 04/22/22 1759    Lab Status: Final result Specimen: Blood from Arm, Right Updated: 04/22/22 1826     Sodium 128 mmol/L      Potassium 3 7 mmol/L      Chloride 95 mmol/L      CO2 22 mmol/L      ANION GAP 11 mmol/L       mg/dL      Creatinine 6 51 mg/dL      Glucose 233 mg/dL      Calcium 9 2 mg/dL      Corrected Calcium 10 0 mg/dL      AST 16 U/L      ALT 25 U/L      Alkaline Phosphatase 129 U/L      Total Protein 7 3 g/dL      Albumin 3 0 g/dL      Total Bilirubin 0 77 mg/dL      eGFR 7 ml/min/1 73sq m     Narrative:      National Kidney Disease Foundation guidelines for Chronic Kidney Disease (CKD):     Stage 1 with normal or high GFR (GFR > 90 mL/min/1 73 square meters)    Stage 2 Mild CKD (GFR = 60-89 mL/min/1 73 square meters)    Stage 3A Moderate CKD (GFR = 45-59 mL/min/1 73 square meters)    Stage 3B Moderate CKD (GFR = 30-44 mL/min/1 73 square meters)    Stage 4 Severe CKD (GFR = 15-29 mL/min/1 73 square meters)    Stage 5 End Stage CKD (GFR <15 mL/min/1 73 square meters)  Note: GFR calculation is accurate only with a steady state creatinine    CBC and differential [[id number]]  (Abnormal) Collected: 04/22/22 1759    Lab Status: Final result Specimen: Blood from Arm, Right Updated: 04/22/22 1808     WBC 13 65 Thousand/uL      RBC 3 25 Million/uL      Hemoglobin 10 1 g/dL      Hematocrit 29 3 %      MCV 90 fL      MCH 31 1 pg      MCHC 34 5 g/dL      RDW 15 6 %      MPV 10 0 fL      Platelets 281 Thousands/uL      nRBC 0 /100 WBCs      Neutrophils Relative 84 %      Immat GRANS % 1 %      Lymphocytes Relative 6 %      Monocytes Relative 8 %      Eosinophils Relative 1 %      Basophils Relative 0 %      Neutrophils Absolute 11 52 Thousands/µL      Immature Grans Absolute 0 10 Thousand/uL      Lymphocytes Absolute 0 76 Thousands/µL      Monocytes Absolute 1 08 Thousand/µL      Eosinophils Absolute 0 13 Thousand/µL      Basophils Absolute 0 06 Thousands/µL                  No orders to display         Procedures  Procedures      ED Course  ED Course as of 04/22/22 1928 Fri Apr 22, 2022   1822 Neutrophils %(!): 80   1926 Admitted to [facility] for ELZBIETA               Identification of Seniors at Risk      Most Recent Value   (ISAR) Identification of Seniors at Risk    Before the illness or injury that brought you to the Emergency, did you need someone to help you on a regular basis? 1 Filed at: 04/22/2022 1739   In the last 24 hours, have you needed more help than usual? 0 Filed at: 04/22/2022 1739   Have you been hospitalized for one or more nights during the past 6 months? 1 Filed at: 04/22/2022 1739   In general, do you see well? 0 Filed at: 04/22/2022 1739   In general, do you have serious problems with your memory? 0 Filed at: 04/22/2022 1739   Do you take more than three different medications every day? 1 Filed at: 04/22/2022 1739   ISAR Score 3 Filed at: 04/22/2022 1739                              MDM  Number of Diagnoses or Management Options  ELZBIETA (acute kidney injury) ([facility] [address] )  History of CHF (congestive heart failure)  Personal history of colon cancer  Diagnosis management comments: 70-year-old man with history of sigmoid cancer undergoing active chemotherapy, chronic kidney disease stage IV, CHF, paroxysmal atrial fibrillation, obstructive sleep apnea, hypertension and diabetes type 2 with ELZBIETA  Patient will need careful fluid resuscitation and diuresis    Patient is admitted to medicine       Amount and/or Complexity of Data Reviewed  Clinical lab tests: ordered and reviewed  Discussion of test results with the performing providers: yes  Review and summarize past medical records: yes  Discuss the patient with other providers: yes    Risk of Complications, Morbidity, and/or Mortality  Presenting problems: moderate  Diagnostic procedures: moderate        Disposition  Final diagnoses:   [facility]   Personal history of colon cancer   History of CHF (congestive heart failure)     Time reflects when diagnosis was documented in both MDM as applicable and the Disposition within this note     Time User Action Codes Description Comment    4/22/2022  7:24 PM Meli Peek Add [N17 [id number]] ELZBIETA (acute kidney injury) ([facility] )     4/22/2022  7:25 PM Meli Peek Add [[id number]] Personal history of colon cancer     4/22/2022  7:25 PM Meli Peek Add [[id number]] History of CHF (congestive heart failure)       ED Disposition     ED Disposition Condition Date/Time Comment    Admit Stable Fri Apr 22, 2022  7:24 PM Case was discussed with [facility] and the patient's admission status was agreed to be Admission Status: inpatient status to the service of Dr Glen Srinivasan   Follow-up Information    None         Patient's Medications   Discharge Prescriptions    No medications on file     No discharge procedures on file  PDMP Review       Value Time User    PDMP Reviewed  Yes 1/31/2020  1:53 PM Kole Harkins DO           ED Provider  Attending physically available and evaluated Nasreen Kathy  SHERINE managed the patient along with the ED Attending      Electronically Signed by         Magda Bermeo MD  04/22/22 3406

## 2022-04-22 NOTE — TELEPHONE ENCOUNTER
Per Marion Dakin RN patients treatment will be held, they will forward the lab results to his nephrologist   Per [facility] she will be calling patient to make him aware of same  Pharmacy made aware of holding treatment

## 2022-04-23 ENCOUNTER — APPOINTMENT (INPATIENT)
Dept: NON INVASIVE DIAGNOSTICS | Facility: HOSPITAL | Age: 67
DRG: 674 | End: 2022-04-23
Payer: MEDICARE

## 2022-04-23 LAB
ANION GAP SERPL CALCULATED.3IONS-SCNC: 12 MMOL/L (ref 4–13)
AORTIC ROOT: 3.3 CM
AORTIC VALVE MEAN VELOCITY: 19.6 M/S
APICAL FOUR CHAMBER EJECTION FRACTION: 57 %
ASCENDING AORTA: 3.5 CM (ref 2.13–3.19)
AV AREA BY CONTINUOUS VTI: 1.6 CM2
AV AREA PEAK VELOCITY: 1.6 CM2
AV LVOT MEAN GRADIENT: 4 MMHG
AV LVOT PEAK GRADIENT: 7 MMHG
AV MEAN GRADIENT: 18 MMHG
AV PEAK GRADIENT: 33 MMHG
AV REGURGITATION PRESSURE HALF TIME: 515 MS
AV VALVE AREA: 1.62 CM2
AV VELOCITY RATIO: 0.46
BACTERIA UR QL AUTO: ABNORMAL /HPF
BASOPHILS # BLD AUTO: 0.05 THOUSANDS/ΜL (ref 0–0.1)
BASOPHILS NFR BLD AUTO: 0 % (ref 0–1)
BILIRUB UR QL STRIP: NEGATIVE
BUN SERPL-MCNC: 128 MG/DL (ref 5–25)
CALCIUM SERPL-MCNC: 9.2 MG/DL (ref 8.3–10.1)
CHLORIDE SERPL-SCNC: 97 MMOL/L (ref 100–108)
CLARITY UR: CLEAR
CO2 SERPL-SCNC: 23 MMOL/L (ref 21–32)
COLOR UR: ABNORMAL
CREAT SERPL-MCNC: 6.28 MG/DL (ref 0.6–1.3)
CREAT UR-MCNC: 64.8 MG/DL
DOP CALC AO PEAK VEL: 2.87 M/S
DOP CALC AO VTI: 65.43 CM
DOP CALC LVOT AREA: 3.46 CM2
DOP CALC LVOT DIAMETER: 2.1 CM
DOP CALC LVOT PEAK VEL VTI: 30.53 CM
DOP CALC LVOT PEAK VEL: 1.33 M/S
DOP CALC LVOT STROKE INDEX: 47.2 ML/M2
DOP CALC LVOT STROKE VOLUME: 105.69 CM3
DOP CALC MV VTI: 74.71 CM
EOSINOPHIL # BLD AUTO: 0.16 THOUSAND/ΜL (ref 0–0.61)
EOSINOPHIL NFR BLD AUTO: 1 % (ref 0–6)
ERYTHROCYTE [DISTWIDTH] IN BLOOD BY AUTOMATED COUNT: 15.3 % (ref 11.6–15.1)
FRACTIONAL SHORTENING: 42 % (ref 28–44)
GFR SERPL CREATININE-BSD FRML MDRD: 8 ML/MIN/1.73SQ M
GLUCOSE SERPL-MCNC: 166 MG/DL (ref 65–140)
GLUCOSE SERPL-MCNC: 172 MG/DL (ref 65–140)
GLUCOSE SERPL-MCNC: 214 MG/DL (ref 65–140)
GLUCOSE SERPL-MCNC: 225 MG/DL (ref 65–140)
GLUCOSE SERPL-MCNC: 86 MG/DL (ref 65–140)
GLUCOSE SERPL-MCNC: 98 MG/DL (ref 65–140)
GLUCOSE UR STRIP-MCNC: NEGATIVE MG/DL
HCT VFR BLD AUTO: 29 % (ref 36.5–49.3)
HGB BLD-MCNC: 9.9 G/DL (ref 12–17)
HGB UR QL STRIP.AUTO: NEGATIVE
IMM GRANULOCYTES # BLD AUTO: 0.13 THOUSAND/UL (ref 0–0.2)
IMM GRANULOCYTES NFR BLD AUTO: 1 % (ref 0–2)
INTERVENTRICULAR SEPTUM IN DIASTOLE (PARASTERNAL SHORT AXIS VIEW): 1.4 CM
INTERVENTRICULAR SEPTUM: 1.4 CM (ref 0.55–1.04)
KETONES UR STRIP-MCNC: NEGATIVE MG/DL
LAAS-AP4: 38.7 CM2
LEFT ATRIUM SIZE: 5 CM
LEFT INTERNAL DIMENSION IN SYSTOLE: 3 CM (ref 3.77–5.7)
LEFT VENTRICULAR INTERNAL DIMENSION IN DIASTOLE: 5.2 CM (ref 6.25–9.32)
LEFT VENTRICULAR POSTERIOR WALL IN END DIASTOLE: 1 CM (ref 0.54–1.02)
LEFT VENTRICULAR STROKE VOLUME: 92 ML
LEUKOCYTE ESTERASE UR QL STRIP: NEGATIVE
LVSV (TEICH): 92 ML
LYMPHOCYTES # BLD AUTO: 1.35 THOUSANDS/ΜL (ref 0.6–4.47)
LYMPHOCYTES NFR BLD AUTO: 10 % (ref 14–44)
MAGNESIUM SERPL-MCNC: 2.4 MG/DL (ref 1.6–2.6)
MCH RBC QN AUTO: 31.3 PG (ref 26.8–34.3)
MCHC RBC AUTO-ENTMCNC: 34.1 G/DL (ref 31.4–37.4)
MCV RBC AUTO: 92 FL (ref 82–98)
MONOCYTES # BLD AUTO: 1.44 THOUSAND/ΜL (ref 0.17–1.22)
MONOCYTES NFR BLD AUTO: 10 % (ref 4–12)
MV E'TISSUE VEL-LAT: 10 CM/S
MV E'TISSUE VEL-SEP: 7 CM/S
MV MEAN GRADIENT: 8 MMHG
MV PEAK GRADIENT: 23 MMHG
MV VALVE AREA BY CONTINUITY EQUATION: 1.41 CM2
NEUTROPHILS # BLD AUTO: 10.77 THOUSANDS/ΜL (ref 1.85–7.62)
NEUTS SEG NFR BLD AUTO: 78 % (ref 43–75)
NITRITE UR QL STRIP: NEGATIVE
NON-SQ EPI CELLS URNS QL MICRO: ABNORMAL /HPF
NRBC BLD AUTO-RTO: 0 /100 WBCS
PH UR STRIP.AUTO: 6 [PH]
PHOSPHATE SERPL-MCNC: 5.9 MG/DL (ref 2.3–4.1)
PLATELET # BLD AUTO: 366 THOUSANDS/UL (ref 149–390)
PMV BLD AUTO: 10.4 FL (ref 8.9–12.7)
POTASSIUM SERPL-SCNC: 3.5 MMOL/L (ref 3.5–5.3)
PROT UR STRIP-MCNC: ABNORMAL MG/DL
PROT UR-MCNC: 151 MG/DL
PROT/CREAT UR: 2.33 MG/G{CREAT} (ref 0–0.1)
RBC # BLD AUTO: 3.16 MILLION/UL (ref 3.88–5.62)
RBC #/AREA URNS AUTO: ABNORMAL /HPF
RIGHT ATRIAL 2D VOLUME: 111 ML
RIGHT ATRIUM AREA SYSTOLE A4C: 30.9 CM2
RIGHT VENTRICLE ID DIMENSION: 4.8 CM
SL CV AV DECELERATION TIME RETROGRADE: 1776 MS
SL CV AV PEAK GRADIENT RETROGRADE: 69 MMHG
SL CV LV EF: 60
SL CV PED ECHO LEFT VENTRICLE DIASTOLIC VOLUME (MOD BIPLANE) 2D: 127 ML
SL CV PED ECHO LEFT VENTRICLE SYSTOLIC VOLUME (MOD BIPLANE) 2D: 35 ML
SODIUM SERPL-SCNC: 132 MMOL/L (ref 136–145)
SP GR UR STRIP.AUTO: 1.01 (ref 1–1.03)
TR MAX PG: 49 MMHG
TR PEAK VELOCITY: 3.5 M/S
TRICUSPID VALVE PEAK REGURGITATION VELOCITY: 3.5 M/S
UROBILINOGEN UR STRIP-ACNC: <2 MG/DL
WBC # BLD AUTO: 13.9 THOUSAND/UL (ref 4.31–10.16)
WBC #/AREA URNS AUTO: ABNORMAL /HPF
Z-SCORE OF ASCENDING AORTA: 3.14
Z-SCORE OF INTERVENTRICULAR SEPTUM IN END DIASTOLE: 4.88
Z-SCORE OF LEFT VENTRICULAR DIMENSION IN END DIASTOLE: -3.84
Z-SCORE OF LEFT VENTRICULAR DIMENSION IN END SYSTOLE: -3.44
Z-SCORE OF LEFT VENTRICULAR POSTERIOR WALL IN END DIASTOLE: 1.76

## 2022-04-23 PROCEDURE — 97162 PT EVAL MOD COMPLEX 30 MIN: CPT

## 2022-04-23 PROCEDURE — 84100 ASSAY OF PHOSPHORUS: CPT | Performed by: INTERNAL MEDICINE

## 2022-04-23 PROCEDURE — 84156 ASSAY OF PROTEIN URINE: CPT | Performed by: INTERNAL MEDICINE

## 2022-04-23 PROCEDURE — 93306 TTE W/DOPPLER COMPLETE: CPT | Performed by: INTERNAL MEDICINE

## 2022-04-23 PROCEDURE — 82570 ASSAY OF URINE CREATININE: CPT | Performed by: INTERNAL MEDICINE

## 2022-04-23 PROCEDURE — 93306 TTE W/DOPPLER COMPLETE: CPT

## 2022-04-23 PROCEDURE — 85025 COMPLETE CBC W/AUTO DIFF WBC: CPT | Performed by: INTERNAL MEDICINE

## 2022-04-23 PROCEDURE — 99222 1ST HOSP IP/OBS MODERATE 55: CPT | Performed by: INTERNAL MEDICINE

## 2022-04-23 PROCEDURE — 83735 ASSAY OF MAGNESIUM: CPT | Performed by: INTERNAL MEDICINE

## 2022-04-23 PROCEDURE — 81001 URINALYSIS AUTO W/SCOPE: CPT | Performed by: INTERNAL MEDICINE

## 2022-04-23 PROCEDURE — 99233 SBSQ HOSP IP/OBS HIGH 50: CPT | Performed by: INTERNAL MEDICINE

## 2022-04-23 PROCEDURE — 80048 BASIC METABOLIC PNL TOTAL CA: CPT | Performed by: INTERNAL MEDICINE

## 2022-04-23 PROCEDURE — 82948 REAGENT STRIP/BLOOD GLUCOSE: CPT

## 2022-04-23 RX ORDER — SEVELAMER HYDROCHLORIDE 800 MG/1
800 TABLET, FILM COATED ORAL
Status: DISCONTINUED | OUTPATIENT
Start: 2022-04-23 | End: 2022-04-27 | Stop reason: HOSPADM

## 2022-04-23 RX ORDER — FUROSEMIDE 10 MG/ML
20 SYRINGE (ML) INJECTION CONTINUOUS
Status: DISCONTINUED | OUTPATIENT
Start: 2022-04-23 | End: 2022-04-25

## 2022-04-23 RX ADMIN — APIXABAN 5 MG: 5 TABLET, FILM COATED ORAL at 16:54

## 2022-04-23 RX ADMIN — FUROSEMIDE 120 MG: 10 INJECTION, SOLUTION INTRAVENOUS at 09:57

## 2022-04-23 RX ADMIN — SODIUM BICARBONATE 650 MG TABLET 650 MG: at 16:54

## 2022-04-23 RX ADMIN — SEVELAMER HYDROCHLORIDE 800 MG: 800 TABLET, FILM COATED PARENTERAL at 12:17

## 2022-04-23 RX ADMIN — SEVELAMER HYDROCHLORIDE 800 MG: 800 TABLET, FILM COATED PARENTERAL at 16:54

## 2022-04-23 RX ADMIN — INSULIN LISPRO 1 UNITS: 100 INJECTION, SOLUTION INTRAVENOUS; SUBCUTANEOUS at 12:17

## 2022-04-23 RX ADMIN — METOPROLOL TARTRATE 50 MG: 50 TABLET, FILM COATED ORAL at 09:32

## 2022-04-23 RX ADMIN — SEVELAMER HYDROCHLORIDE 800 MG: 800 TABLET, FILM COATED PARENTERAL at 09:32

## 2022-04-23 RX ADMIN — TAMSULOSIN HYDROCHLORIDE 0.4 MG: 0.4 CAPSULE ORAL at 16:54

## 2022-04-23 RX ADMIN — APIXABAN 5 MG: 5 TABLET, FILM COATED ORAL at 09:32

## 2022-04-23 RX ADMIN — PRAVASTATIN SODIUM 40 MG: 40 TABLET ORAL at 16:54

## 2022-04-23 RX ADMIN — INSULIN LISPRO 2 UNITS: 100 INJECTION, SOLUTION INTRAVENOUS; SUBCUTANEOUS at 16:57

## 2022-04-23 RX ADMIN — Medication 20 MG/HR: at 11:29

## 2022-04-23 RX ADMIN — SODIUM BICARBONATE 650 MG TABLET 650 MG: at 09:32

## 2022-04-23 RX ADMIN — METOPROLOL TARTRATE 50 MG: 50 TABLET, FILM COATED ORAL at 21:18

## 2022-04-23 RX ADMIN — INSULIN GLARGINE 10 UNITS: 100 INJECTION, SOLUTION SUBCUTANEOUS at 21:18

## 2022-04-23 NOTE — PROGRESS NOTES
[facility] [facility]  Progress Note - Wilfredo Santana 1955, 74 y o  male MRN: [id number]  [facility] Encounter: [id number]  Primary Care Provider: Clarissa Mckinney DO   Date and time admitted to hospital: 4/22/2022  5:30 PM    Chronic obstructive pulmonary disease [facility])  Assessment & Plan  Patient with noted history of COPD, comfortable on room air saturating over 96%    Hyponatremia  Assessment & Plan  Secondary to diuretics and worsening kidney function  Fluid restriction; improving      Chronic diastolic (congestive) heart failure (HCC)  Assessment & Plan  Wt Readings from Last 3 Encounters:   04/22/22 96 7 kg (213 lb 3 oz)   04/18/22 92 2 kg (203 lb 4 2 oz)   04/14/22 93 kg (205 lb)     Patient with history of chronic diastolic failure, will obtain repeat echo during this admission  -holding diuretics until assessed by Nephrology        Stage 4 chronic kidney disease [facility])  Assessment & Plan  Lab Results   Component Value Date    EGFR 8 04/23/2022    EGFR 7 04/22/2022    EGFR 7 04/22/2022    CREATININE 6 28 (H) 04/23/2022    CREATININE 6 51 (H) 04/22/2022    CREATININE 6 76 (H) 04/22/2022   Patient with progression of stage IV CKD/acute kidney injury  -management as under acute kidney injury  -Lasix infusion started by Nephrology  -patient continues to make urine although has been decreasing in amount over the past 2 days    Type 2 diabetes mellitus with chronic kidney disease, with long-term current use of insulin [facility])  Assessment & Plan  Lab Results   Component Value Date    HGBA1C 6 0 (H) 02/18/2022       Recent Labs     04/22/22  2316 04/23/22  0745   POCGLU 172* 98       Blood Sugar Average: Last 72 hrs:  (P) 135 substitute Lantus 20 units at bedtime for home insulin  Blood sugar checks his meal sliding scale    NICOLASA (obstructive sleep apnea)  Assessment & Plan  Patient with history of NICOLASA compliant with CPAP    PAF (paroxysmal atrial fibrillation) [facility])  Assessment & Plan  Patient with history of paroxysmal Afib  -rate control with Lopressor 50 mg b i d   -anticoagulation with Eliquis 5 mg b i d-does not meet criteria for dose reduction    Hyperlipidemia  Assessment & Plan  Continue statin for hyperlipidemia, patient denies any myalgias, LFTs within normal limits    Gout  Assessment & Plan  Noted history of gout; will hold allopurinol for now    * ELZBIETA (acute kidney injury) [facility])  Assessment & Plan  Patient with history of CKD for awaiting AV fistula placement for future dialysis  -unfortunately over the past several weeks has had worsening creatinine now elevated up to 6  -patient has been taking metolazone twice a week , along with daily 40 mg torsemide in the morning and 20 mg torsemide in the p m, for lower extremity edema that has unfortunately been nonresponsive to treatment  -hold all nephrotoxins  -urinalysis - bland, protein creatinine ratio -2 33  -nephrology consult    4/23-appreciate urology recommendations, have initiated Lasix infusion and will monitor for improvement  Consider PermCath placement in 24-48 hours if does not improve with Lasix and supportive care  Nephrology note, patient has consented to dialysis if needed  VTE Pharmacologic Prophylaxis:   Pharmacologic: Apixaban (Eliquis)  Mechanical VTE Prophylaxis in Place: Yes    Patient Centered Rounds: I have performed bedside rounds with nursing staff today  Discussions with Specialists or Other Care Team Provider:  Nephrology    Education and Discussions with Family / Patient: Discussed treatment plan with family and patient who agree with current plan; encouraged to ask questions and participate  Time Spent for Care: 45 minutes  More than 50% of total time spent on counseling and coordination of care as described above      Current Length of Stay: 1 day(s)    Current Patient Status: Inpatient   Certification Statement: The patient will continue to require additional inpatient hospital stay due to Treatment of acute kidney injury    Discharge Plan: To be determined    Code Status: Level 1 - Full Code      Subjective:   Patient seen examined bedside, no acute distress or discomfort noted  Patient states he feels in his normal state health and is only here because is labs showed increased creatinine  Discussed with Nephrology, continue Lasix drip and monitor for improvement  If does not improve in 24-48 hours; will consider PermCath placement  All discussed with patient and wife at bedside; no other needs at this time  Objective:     Vitals:   Temp (24hrs), Av 8 °F (36 6 °C), Min:97 7 °F (36 5 °C), Max:97 8 °F (36 6 °C)    Temp:  [97 7 °F (36 5 °C)-97 8 °F (36 6 °C)] 97 8 °F (36 6 °C)  HR:  [60-89] 65  Resp:  [16-20] 20  BP: (134-142)/(61-68) 136/61  SpO2:  [93 %-96 %] 94 %  Body mass index is 30 59 kg/m²  Input and Output Summary (last 24 hours): Intake/Output Summary (Last 24 hours) at 2022 1407  Last data filed at 2022 1201  Gross per 24 hour   Intake --   Output 1326 ml   Net -1326 ml       Physical Exam:     Physical Exam  Vitals and nursing note reviewed  Constitutional:       Appearance: He is well-developed  HENT:      Head: Normocephalic and atraumatic  Eyes:      Conjunctiva/sclera: Conjunctivae normal    Cardiovascular:      Rate and Rhythm: Normal rate and regular rhythm  Heart sounds: Murmur heard  Pulmonary:      Effort: Pulmonary effort is normal  No respiratory distress  Abdominal:      Palpations: Abdomen is soft  Tenderness: There is no abdominal tenderness  Musculoskeletal:      Cervical back: Neck supple  Right lower leg: Edema present  Left lower leg: Edema present  Skin:     General: Skin is warm and dry  Neurological:      Mental Status: He is alert and oriented to person, place, and time     Psychiatric:         Behavior: Behavior normal            Additional Data:     Labs:    Results from last 7 days   Lab Units 04/23/22  0524   WBC Thousand/uL 13 90*   HEMOGLOBIN g/dL 9 9*   HEMATOCRIT % 29 0*   PLATELETS Thousands/uL 366   NEUTROS PCT % 78*   LYMPHS PCT % 10*   MONOS PCT % 10   EOS PCT % 1     Results from last 7 days   Lab Units 04/23/22  0524 04/22/22  1759 04/22/22  1759   SODIUM mmol/L 132*   < > 128*   POTASSIUM mmol/L 3 5   < > 3 7   CHLORIDE mmol/L 97*   < > 95*   CO2 mmol/L 23   < > 22   BUN mg/dL 128*   < > 136*   CREATININE mg/dL 6 28*   < > 6 51*   ANION GAP mmol/L 12   < > 11   CALCIUM mg/dL 9 2   < > 9 2   ALBUMIN g/dL  --   --  3 0*   TOTAL BILIRUBIN mg/dL  --   --  0 77   ALK PHOS U/L  --   --  129*   ALT U/L  --   --  25   AST U/L  --   --  16   GLUCOSE RANDOM mg/dL 86   < > 233*    < > = values in this interval not displayed  Results from last 7 days   Lab Units 04/23/22  1121 04/23/22  0745 04/22/22  2316   POC GLUCOSE mg/dl 166* 98 172*                   * I Have Reviewed All Lab Data Listed Above  * Additional Pertinent Lab Tests Reviewed:  All Labs Within Last 24 Hours Reviewed    Imaging:    Imaging Reports Reviewed Today Include:   Imaging Personally Reviewed by Myself Includes:      Recent Cultures (last 7 days):           Last 24 Hours Medication List:   Current Facility-Administered Medications   Medication Dose Route Frequency Provider Last Rate    apixaban  5 mg Oral BID Bro Duran, DO      furosemide  20 mg/hr Intravenous Continuous Rell Jamison, DO 20 mg/hr (04/23/22 1129)    insulin glargine  10 Units Subcutaneous HS Bro Renee, DO      insulin lispro  1-5 Units Subcutaneous TID AC Bro Renee, DO      metoprolol tartrate  50 mg Oral Q12H [address] Bro Banlucía, DO      ondansetron  4 mg Intravenous Q6H PRN Bro Renee, DO      pravastatin  40 mg Oral Daily With [employer], DO      sevelamer  800 mg Oral TID With Meals Rell Jamison, DO      sodium bicarbonate  650 mg Oral BID after meals Bro Duran, DO      tamsulosin 0 4 mg Oral Daily With [employer], DO          Today, Patient Was Seen By: Jose Perez MD    ** Please Note: Dictation voice to text software may have been used in the creation of this document   **

## 2022-04-23 NOTE — PLAN OF CARE
Problem: INFECTION - ADULT  Goal: Absence or prevention of progression during hospitalization  Description: INTERVENTIONS:  - Assess and monitor for signs and symptoms of infection  - Monitor lab/diagnostic results  - Monitor all insertion sites, i e  indwelling lines, tubes, and drains  - Monitor endotracheal if appropriate and nasal secretions for changes in amount and color  - [place] appropriate cooling/warming therapies per order  - Administer medications as ordered  - Instruct and encourage patient and family to use good hand hygiene technique  - Identify and instruct in appropriate isolation precautions for identified infection/condition  Outcome: Progressing  Goal: Absence of fever/infection during neutropenic period  Description: INTERVENTIONS:  - Monitor WBC    Outcome: Progressing

## 2022-04-23 NOTE — CONSULTS
Consultation - Nephrology   Sandra Mares 74 y o  male MRN: [id number]  Unit/Bed#: [facility] 905-01 Encounter: [id number]      Assessment/Plan:  1  Acute kidney injury, etiology multifactorial could be component volume overload versus underlying progression his disease  2  Chronic kidney disease, previous baseline creatinine recently between 3 4-4 0  EGFR between 14 and 17  3  Volume overload with evidence of pulmonary edema and increasing lower extremity swelling  Given advanced CKD, would place on Lasix infusion with metolazone as needed  4  Sigmoid colon malignancy, following closely with Hematology Oncology, previously on 5 FU and Leucovorin, chemotherapy now currently on hold given worsening renal dysfunction  5  Anemia of chronic kidney disease hemoglobin currently 9 9  6  CKD associated mineral bone disorder, phosphorus elevated at 5 9, would initiate phosphate binders  7  Metabolic acidosis, continue with sodium bicarbonate therapy for now    Plan:  · Unfortunately patient with significant worsening renal function  Given his previous advanced CKD likely represents progression towards end-stage disease  · Will trial Lasix infusion given patient's volume overload  If there is no significant improvement in next 24-48 hours with likely proceed with PermCath placement  · Would start phosphate binders  · No other changes in current regimen  · Consent obtained for dialysis, signed and placed on chart    History of Present Illness   Physician Requesting Consult: Lucila Mcgill MD  Reason for Consult / Principal Problem:  Acute kidney injury  HPI: Sandra Mares is a 74y o  year old male who presents with abnormal renal function and worsening lower extremity swelling    Patient is a 57-year-old male with a past medical history significant for CKD stage 4, diastolic heart failure, paroxysmal AFib, obstructive sleep apnea, diabetes, hypertension, sigmoid cancer previously receiving chemotherapy    Outpatient laboratory studies showed significant worsening renal function  Patient also complaining of increasing lower extremity swelling which has not improved despite increasing torsemide and using metolazone  Also noted increasing abdominal bloating and increase in his weight size  Mild shortness of breath expression with exertion  No appetite changes  Denies any metallic taste  No reports of nausea or vomiting  History obtained from chart review and the patient    Review of Systems   Constitutional: Positive for fatigue  Negative for appetite change  Respiratory: Positive for shortness of breath  Negative for chest tightness  Cardiovascular: Positive for leg swelling  Negative for chest pain  Gastrointestinal: Positive for abdominal distention  Negative for abdominal pain, diarrhea, nausea and vomiting  Genitourinary: Negative for difficulty urinating  Neurological: Negative for syncope         Pertinent findings of a 10 point review of systems noted above otherwise all others negative    Historical Information   Patient Active Problem List   Diagnosis    Bilateral leg edema    Diabetic ulcer of toe of left foot associated with type 2 diabetes mellitus ([facility])    Easy bruising    Eczema    Erectile dysfunction of non-organic origin    Gout    Hyperlipidemia    Uremic acidosis    Arthritis    Peripheral arterial disease ([facility])    PVCs (premature ventricular contractions)    Rhinitis    Systolic murmur    Vertigo    Complete heart block ([facility])    Metabolic acidosis    ELZBIETA [facility]acute kidney [facility]) ([facility] [address] )    Other hyperlipidemia    PAF (paroxysmal atrial fibrillation) ([facility])    Persistent proteinuria    Volume overload    Urinary retention    NICOLASA (obstructive sleep apnea)    Type 2 diabetes mellitus with chronic kidney disease, with long-term current use of insulin ([facility])    Hypertension    Stage 4 chronic kidney disease ([facility])    Nonrheumatic aortic valve stenosis    Anemia    Fever    Mitral valve stenosis    Abnormal CT of the chest    Hyperkalemia    Acute pulmonary edema (HCC)    Chronic diastolic (congestive) heart failure (HCC)    Left renal artery stenosis (HCC)    S/P amputation of lesser toe, left (HCC)    S/P amputation of lesser toe, right (HCC)    Elevated troponin I level    Staphylococcus aureus bacteremia    Type 2 diabetes mellitus with diabetic neuropathy, without long-term current use of insulin (HCC)    Hyponatremia    Iron deficiency anemia    Anxiety    Osteomyelitis of left foot (HCC)    Amputation of left great toe (HCC)    Multiple drug resistant organism (MDRO) culture positive    Renovascular hypertension    SSS (sick sinus syndrome) (HCC)    Chronic obstructive pulmonary disease (HCC)    Absence of toe (HCC)    Chronic painful diabetic neuropathy (HCC)    Onychomycosis    Colon cancer (HCC)    Acute kidney injury superimposed on chronic kidney disease (HCC)    RSV (respiratory syncytial virus pneumonia)    Chemotherapy-induced neutropenia (HCC)    Chemotherapy-induced nausea    Acute on chronic diastolic congestive heart failure (HCC)    Other iron deficiency anemias     Past Medical History:   Diagnosis Date    Anemia     iron def    Arthritis     OA    Bruise of both arms     forearms and both hands    Bruises easily     Cancer ([facility] [address] ) 09/01/2021    colon    CHF (congestive heart failure) (HCC)     Chronic kidney disease     CPAP (continuous positive airway pressure) dependence     Diabetes mellitus (HCC)     Diabetic foot ulcer (HCC)     Eczema     Erectile dysfunction     Gout     Heart murmur     History of permanent cardiac pacemaker placement 11/2018    Hyperlipidemia     Hypertension     Hypoglycemia 3/8/2019    Murmur     Nephropathy     Osteoarthritis     Pacemaker 11/06/2018    PAD (peripheral artery disease) (HCC)     Seasonal allergies     Sleep apnea     Toe infection     bilat great toes    Vertigo     Walks frequently     Wears dentures     upper    Wears glasses      Past Surgical History:   Procedure Laterality Date    CARDIAC PACEMAKER PLACEMENT      CARPAL TUNNEL RELEASE Left     CARPAL TUNNEL RELEASE Right     CARPAL TUNNEL RELEASE      COLONOSCOPY  08/2020    COLONOSCOPY      FL GUIDED CENTRAL VENOUS ACCESS DEVICE INSERTION  1/11/2022    FOOT AMPUTATION Left 2/10/2020    Procedure: PARTIAL 1ST RAY AMPUTATION;  Surgeon: Alida Vargas DPM;  [facility];  Service: Podiatry    INCISION AND DRAINAGE OF WOUND Left 1/12/2020    Procedure: INCISION AND DRAINAGE (I&D) EXTREMITY AND REMOVAL OF SESMOID BONE;  Surgeon: Lionel Dixon DPM;  [facility];  Service: Podiatry    IR OTHER  1/21/2022    IR PICC REPOSITION  1/14/2020    KNEE ARTHROSCOPY Left     KNEE ARTHROSCOPY Right     KNEE SURGERY      FL AMPUTATION TOE,I-P JT Bilateral 5/2/2017    Procedure: PARTIAL AMPUTATION RIGHT AND LEFT HALLUX ;  Surgeon: Alida Vargas DPM;  [facility];  Service: Podiatry    FL AMPUTATION TOE,MT-P JT Left 7/25/2017    Procedure: 2ND TOE AMPUTATION;  Surgeon: Alida Vargas DPM;  [facility];  Service: Podiatry    FL INSJ TUNNELED CTR VAD W/SUBQ PORT AGE 5 YR/> N/A 1/11/2022    Procedure: INSERTION VENOUS PORT ( PORT-A-CATH) IR;  Surgeon: Rene Trent DO;  [facility];  Service: Interventional Radiology    RESECTION LOW ANTERIOR LAPAROSCOPIC N/A 10/21/2021    Procedure: RESECTION LOW ANTERIOR LAPAROSCOPIC;  Surgeon: Eric Roman MD;  [facility];  Service: Colorectal    SHOULDER ARTHROSCOPY Left     with screws,RTC    SHOULDER SURGERY      TOE AMPUTATION Left 1/8/2020    Procedure: HALLUX AMPUTATION;  Surgeon: Lionel Dixon DPM;  [facility];  Service: Podiatry    UPPER GASTROINTESTINAL ENDOSCOPY  03/2020    Intestinal metaplasia prepyloric    VASECTOMY       Social History   Social History     Substance and Sexual Activity   Alcohol Use Never  Alcohol/week: 0 0 standard drinks     Social History     Substance and Sexual Activity   Drug Use No     Social History     Tobacco Use   Smoking Status Former Smoker    Packs/day: 0 50    Years: 20 00    Pack years: 10 00    Types: Cigarettes    Start date: 1    Quit date:     Years since quittin 3   Smokeless Tobacco Never Used   Tobacco Comment    quit 10 years ago     Family History   Problem Relation Age of Onset    Heart disease Father         passed away   Chloe Degroot Hypertension Father     Pulmonary embolism Father     Hypertension Mother         assed away       Meds/Allergies   current meds:   Current Facility-Administered Medications   Medication Dose Route Frequency    apixaban (ELIQUIS) tablet 5 mg  5 mg Oral BID    insulin glargine (LANTUS) subcutaneous injection 10 Units 0 1 mL  10 Units Subcutaneous HS    insulin lispro (HumaLOG) 100 units/mL subcutaneous injection 1-5 Units  1-5 Units Subcutaneous TID AC    metoprolol tartrate (LOPRESSOR) tablet 50 mg  50 mg Oral Q12H REBA    ondansetron (ZOFRAN) injection 4 mg  4 mg Intravenous Q6H PRN    pravastatin (PRAVACHOL) tablet 40 mg  40 mg Oral Daily With Dinner    sodium bicarbonate tablet 650 mg  650 mg Oral BID after meals    tamsulosin (FLOMAX) capsule 0 4 mg  0 4 mg Oral Daily With Dinner       Allergies   Allergen Reactions    Invokana [Canagliflozin] Other (See Comments)     Caused circulation problems    Lamisil [Terbinafine] Blisters     Wife states " His skin peeled from head to toe"    Other Swelling     Pomegranate - facial swelling, no swelling of tongue, esophagus  Adhesive tape        Latex Rash         Objective   /67   Pulse 64   Temp 97 8 °F (36 6 °C)   Resp 20   Ht 5' 10" (1 778 m)   Wt 96 7 kg (213 lb 3 oz)   SpO2 93%   BMI 30 59 kg/m²     Intake/Output Summary (Last 24 hours) at 2022 0339  Last data filed at 2022 0501  Gross per 24 hour   Intake --   Output 601 ml   Net -601 ml Current Weight: Weight - Scale: 96 7 kg (213 lb 3 oz)    Physical Exam  Constitutional:       Appearance: He is not ill-appearing  HENT:      Head: Normocephalic and atraumatic  Mouth/Throat:      Mouth: Mucous membranes are moist       Pharynx: Oropharynx is clear  Eyes:      General: No scleral icterus  Neck:      Comments: Noted right chest wall mid port and left chest wall pacemaker  Cardiovascular:      Rate and Rhythm: Normal rate and regular rhythm  Pulmonary:      Effort: Pulmonary effort is normal       Breath sounds: Examination of the right-lower field reveals rales  Examination of the left-lower field reveals decreased breath sounds  Decreased breath sounds and rales present  Abdominal:      General: There is distension  Palpations: Abdomen is soft  Tenderness: There is no abdominal tenderness  Musculoskeletal:         General: No deformity  Right lower leg: Edema present  Left lower leg: Edema present  Lymphadenopathy:      Cervical: No cervical adenopathy  Skin:     General: Skin is warm and dry  Findings: No rash  Neurological:      Mental Status: He is alert and oriented to person, place, and time             Lab Results:    Results from last 7 days   Lab Units 04/23/22  0524   WBC Thousand/uL 13 90*   HEMOGLOBIN g/dL 9 9*   HEMATOCRIT % 29 0*   PLATELETS Thousands/uL 366     Results from last 7 days   Lab Units 04/23/22  0524   POTASSIUM mmol/L 3 5   CHLORIDE mmol/L 97*   CO2 mmol/L 23   BUN mg/dL 128*   CREATININE mg/dL 6 28*   CALCIUM mg/dL 9 2

## 2022-04-23 NOTE — ASSESSMENT & PLAN NOTE
Lab Results   Component Value Date    HGBA1C 6 0 (H) 02/18/2022       No results for input(s): POCGLU in the last 72 hours      Blood Sugar Average: Last 72 hrs:   substitute Lantus 20 units at bedtime for home insulin  Blood sugar checks his meal sliding scale

## 2022-04-23 NOTE — CASE MANAGEMENT
Case Management Assessment & Discharge Planning Note    Patient name Shaina Toribio  Location [address] 905/PPHP 668-47 MRN [id number]  : 1955 Date 2022       Current Admission Date: 2022  Current Admission Diagnosis:ELZBIETA (acute kidney injury) [facility])   Patient Active Problem List    Diagnosis Date Noted    Other iron deficiency anemias 04/15/2022    Acute on chronic diastolic congestive heart failure ([facility] ) 2022    Chemotherapy-induced nausea 2022    Chemotherapy-induced neutropenia ([facility] ) 2022    Acute kidney injury superimposed on chronic kidney disease ([facility] ) 2021    RSV (respiratory syncytial virus pneumonia) 2021    Colon cancer ([facility] ) 10/23/2021    Absence of toe ([place] [address] ) 2021    Chronic obstructive pulmonary disease ([facility] ) 2020    SSS (sick sinus syndrome) ([facility] ) 2020    Onychomycosis 2020    Chronic painful diabetic neuropathy ([facility] ) 2020    Renovascular hypertension     Multiple drug resistant organism (MDRO) culture positive 2020    Amputation of left great toe ([facility] ) 2020    Osteomyelitis of left foot ([facility] ) 2020    Hyponatremia 2020    Iron deficiency anemia 2020    Anxiety 2020    Staphylococcus aureus bacteremia 2020    Elevated troponin I level 2020    Type 2 diabetes mellitus with diabetic neuropathy, without long-term current use of insulin ([facility] ) 2020    S/P amputation of lesser toe, left ([facility] ) 2019    S/P amputation of lesser toe, right ([facility] ) 2019    Left renal artery stenosis (HCC) 2019    Chronic diastolic (congestive) heart failure (HCC) 2019    Acute pulmonary edema (HCC)     Hyperkalemia 2019    Abnormal CT of the chest 05/15/2019    Mitral valve stenosis     Anemia 2019    Fever 2019    Nonrheumatic aortic valve stenosis 2018    Type 2 diabetes mellitus with chronic kidney disease, with long-term current use of insulin ([place] [address] ) 11/14/2018    Hypertension 11/14/2018    Stage 4 chronic kidney disease ([place] [address] ) 11/14/2018    NICOLASA (obstructive sleep apnea) 11/11/2018    Urinary retention 11/06/2018    Persistent proteinuria 11/05/2018    Volume overload 11/05/2018    PAF (paroxysmal atrial fibrillation) ([place] [address] ) 11/04/2018    Complete heart block (HCC) 14/74/1588    Metabolic acidosis 05/37/6787    ELZBIETA (acute kidney injury) ([place] [address] ) 11/01/2018    Other hyperlipidemia 11/01/2018    Easy bruising 09/27/2017    Peripheral arterial disease ([place] [address] ) 09/06/2017    Vertigo 08/16/2017    Diabetic ulcer of toe of left foot associated with type 2 diabetes mellitus ([place] [address] ) 03/18/2017    Eczema 12/11/2015    PVCs (premature ventricular contractions) 50/65/8029    Systolic murmur 97/05/3817    Bilateral leg edema 07/09/2015    Erectile dysfunction of non-organic origin 04/24/2012    Gout 04/24/2012    Hyperlipidemia 04/24/2012    Uremic acidosis 04/24/2012    Arthritis 04/24/2012    Rhinitis 04/24/2012      LOS (days): 1  Geometric Mean LOS (GMLOS) (days):   Days to GMLOS:     OBJECTIVE:    Risk of Unplanned Readmission Score: 32         Current admission status: Inpatient       Preferred Pharmacy:   [id number] [address] [facility] [address]  [address] [place] [address]  Phone: [phone] Fax: [phone]    Primary Care Provider: Paddy Dumont DO    Primary Insurance: MEDICARE  Secondary Insurance: AAR    ASSESSMENT:  [address], [address] [facility] Representative - Spouse   Primary Phone: [phone] (Mobile)                              Patient Information  Admitted from[de-identified] Home  Mental Status: Alert  During Assessment patient was accompanied by: Spouse  Assessment information provided by[de-identified] Spouse  Primary Caregiver: Self  Support Systems: Self,Spouse/significant [address] of Residence: [facility] do you live in?: [address] [place] [address] entry access options   Select all that apply : Stairs  Number of steps to enter home : 3  Do the steps have railings?: No  Type of Current Residence: 3 story home  Upon entering residence, is there a bedroom on the main floor (no further steps)?: Yes  Upon entering residence, is there a bathroom on the main floor (no further steps)?: Yes  In the last 12 months, was there a time when you were not able to pay the mortgage or rent on time?: No  In the last 12 months, how many places have you lived?: 1  In the last 12 months, was there a time when you did not have a steady place to sleep or slept in a shelter (including now)?: No  Homeless/housing insecurity resource given?: N/A  Living Arrangements: Lives w/ Spouse/significant other  Is patient a ?: No    Activities of Daily Living Prior to Admission  Functional Status: Independent  Completes ADLs independently?: Yes  Ambulates independently?: Yes  Does patient use assisted devices?: No  Does patient currently own DME?: Yes  What DME does the patient currently own?: [employer]  Does patient have a history of Outpatient Therapy (PT/OT)?: No  Does the patient have a history of Short-Term Rehab?: No  Does patient have a history of HHC?: Yes ([facility])  Does patient currently have [facility]?: No         Patient Information Continued  Income Source: Pension/skilled nursing  Does patient have prescription coverage?: Yes  Within the past 12 months, you worried that your food would run out before you got the money to buy more : Never true  Within the past 12 months, the food you bought just didnt last and you didnt have money to get more : Never true  Does patient receive dialysis treatments?: No  Does patient have a history of substance abuse?: No  Does patient have a history of Mental Health Diagnosis?: No         Means of Transportation  Means of Transport to Appts[de-identified] Drives Self  In the past 12 months, has lack of transportation kept you from medical appointments or from getting medications?: No  In the past 12 months, has lack of transportation kept you from meetings, work, or from getting things needed for daily living?: No        DISCHARGE DETAILS:    Discharge planning discussed with[de-identified] patient  Freedom of Choice: Yes     CM contacted family/caregiver?: Yes  Were Treatment Team discharge recommendations reviewed with patient/caregiver?: Yes  Did patient/caregiver verbalize understanding of patient care needs?: Yes  Were patient/caregiver advised of the risks associated with not following Treatment Team discharge recommendations?: Yes    Contacts  Patient Contacts: Sherrell Maldonado  Relationship to Patient[de-identified] Family  Contact Method: In Person  Reason/Outcome: Discharge [address]         Is the patient interested in [facility] at discharge?: No    DME Referral Provided  Referral made for DME?: No          CM reviewed d/c planning process including the following: identifying help at home, patient preference for d/c planning needs, Discharge Lounge, [facility] Meds to Bed program, availability of treatment team to discuss questions or concerns patient and/or family may have regarding understanding medications and recognizing signs and symptoms once discharged  CM also encouraged patient to follow up with all recommended appointments after discharge  Patient advised of importance for patient and family to participate in managing patients medical well being

## 2022-04-23 NOTE — PHYSICAL THERAPY NOTE
Physical Therapy evaluation note     Patient Name: Jaquelin Akhtar    TYNPW'O Date: 4/23/2022     Problem List  Principal Problem:    ELZBIETA (acute kidney injury) ([facility] )  Active Problems:    Gout    Hyperlipidemia    PAF (paroxysmal atrial fibrillation) ([facility])    NICOLASA (obstructive sleep apnea)    Type 2 diabetes mellitus with chronic kidney disease, with long-term current use of insulin ([facility])    Stage 4 chronic kidney disease ([facility])    Chronic diastolic (congestive) heart failure ([facility])    Hyponatremia    Chronic obstructive pulmonary disease ([place] [address] )       Past Medical History  Past Medical History:   Diagnosis Date    Anemia     iron def    Arthritis     OA    Bruise of both arms     forearms and both hands    Bruises easily     Cancer ([place] [address] ) 09/01/2021    colon    CHF (congestive heart failure) ([facility]     Chronic kidney disease     CPAP (continuous positive airway pressure) dependence     Diabetes mellitus ([place] [address] )     Diabetic foot ulcer ([place] [address] )     Eczema     Erectile dysfunction     Gout     Heart murmur     History of permanent cardiac pacemaker placement 11/2018    Hyperlipidemia     Hypertension     Hypoglycemia 3/8/2019    Murmur     Nephropathy     Osteoarthritis     Pacemaker 11/06/2018    PAD (peripheral artery disease) ([facility]     Seasonal allergies     Sleep apnea     Toe infection     bilat great toes    Vertigo     Walks frequently     Wears dentures     upper    Wears glasses         Past Surgical History  Past Surgical History:   Procedure Laterality Date    CARDIAC PACEMAKER PLACEMENT      CARPAL TUNNEL RELEASE Left     CARPAL TUNNEL RELEASE Right     CARPAL TUNNEL RELEASE      COLONOSCOPY  08/2020    COLONOSCOPY      FL GUIDED CENTRAL VENOUS ACCESS DEVICE INSERTION  1/11/2022    FOOT AMPUTATION Left 2/10/2020    Procedure: PARTIAL 1ST RAY AMPUTATION;  Surgeon: Nga Ma DPM;  [facility];  Service: Podiatry    INCISION AND DRAINAGE OF WOUND Left 1/12/2020    Procedure: INCISION AND DRAINAGE (I&D) EXTREMITY AND REMOVAL OF SESMOID BONE;  Surgeon: Yenny Casas DPM;  [facility];  Service: Podiatry    IR OTHER  1/21/2022    IR PICC REPOSITION  1/14/2020    KNEE ARTHROSCOPY Left     KNEE ARTHROSCOPY Right     KNEE SURGERY      NM AMPUTATION TOE,I-P JT Bilateral 5/2/2017    Procedure: PARTIAL AMPUTATION RIGHT AND LEFT HALLUX ;  Surgeon: Melba Guerra DPM;  [facility];  Service: Podiatry    NM AMPUTATION TOE,MT-P JT Left 7/25/2017    Procedure: 2ND TOE AMPUTATION;  Surgeon: Melba Guerra DPM;  [facility];  Service: Podiatry    NM INSJ TUNNELED CTR VAD W/SUBQ PORT AGE 5 YR/> N/A 1/11/2022    Procedure: INSERTION VENOUS PORT ( PORT-A-CATH) IR;  Surgeon: Alvarez Jackson DO;  [facility];  Service: Interventional Radiology    RESECTION LOW ANTERIOR LAPAROSCOPIC N/A 10/21/2021    Procedure: RESECTION LOW ANTERIOR LAPAROSCOPIC;  Surgeon: Ryan Goel MD;  Location[facility];  Service: Colorectal    SHOULDER ARTHROSCOPY Left     with screws,RTC    SHOULDER SURGERY      TOE AMPUTATION Left 1/8/2020    Procedure: HALLUX AMPUTATION;  Surgeon: Yenny Casas DPM;  [facility];  Service: Podiatry    UPPER GASTROINTESTINAL ENDOSCOPY  03/2020    Intestinal metaplasia prepyloric    VASECTOMY        04/23/22 1019   PT Last Visit   PT Visit Date 04/23/22   Note Type   Note type Evaluation   Pain Assessment   Pain Assessment Tool 0-10   Pain Score No Pain   Restrictions/Precautions   Weight Bearing Precautions Per Order Yes   LLE Weight Bearing Per Order WBAT  (per patient in darco boot )   Braces or Orthoses   (darco boot on LLE)   Home Living   Type of [address] Two [address]   Additional Comments Pt lives with his family in a [facility] with [address]  Pt was I for ADL's and mobility prior  Pt did not us any DME prior   Pt is retired and will have assistance at all times upon d c home    Prior Function   Level of [place] Independent with ADLs and functional mobility   Lives With [facility] Help From Family   ADL Assistance Independent   IADLs Independent   Falls in the last 6 months 0   Vocational Retired   General   Family/Caregiver Present No   Cognition   Arousal/Participation Alert   Orientation Level Oriented X4   Memory Within functional limits   Following Commands Follows all commands and directions without difficulty   RLE Assessment   RLE Assessment WFL   LLE Assessment   LLE Assessment WFL   Bed Mobility   Supine to Sit 6  Modified independent   Additional items HOB elevated   Transfers   Sit to Stand 7  Independent   Stand to Sit 7  Independent   Ambulation/Elevation   Gait pattern WNL   Gait Assistance 7  Independent   Assistive Device None  (IV pole, but did not use for support/without IV pole)   Distance [id number]   Stair Management Assistance 5  Supervision   Stair Management Technique One rail R   Number of Stairs 1   Balance   Static Sitting Normal   Dynamic Sitting Good   Static Standing Fair +   Dynamic Standing Fair +   Ambulatory Fair +   Endurance Deficit   Endurance Deficit No   Activity Tolerance   Activity Tolerance Patient tolerated treatment well   Nurse Made Aware nurse approved therapy session   Assessment   Prognosis Excellent   Problem List Decreased mobility   Assessment Pt is a 75 yo male admitted to [place] [address] on 4/22/2022 s/p abnormal labs  DX: ELZBIETA, COPD, hyponatremia, CHF, CKD, DM, A fib, gout, hyperlipidemia  Per patient he was seeing a podiatrist every 2-3 days due to flap surgery on LLE  Pt states that he is WBAT in [address]  Two patient identifiers were used to confirm  Pt lives in a [facility] with [address]  Pt lives with his family  Pt's bed is on the first floor  He goes to the second floor for showers  Pt was I for ADL's and mobility prior  Pt's impairments include reduced mobility  Recommend discharge to home with no needs   At the end of the session the patient was left in seated position with call bell and phone within reach  Pt educated about the importance of ambulating 3-4x a day  Pt will be D/C from PT at this time due to being at his baseline for mobility      Barriers to Discharge None   Recommendation   PT Discharge Recommendation No rehabilitation needs   AM-PAC Basic Mobility Inpatient   Turning in Bed Without Bedrails 4   Lying on Back to Sitting on Edge of Flat Bed 4   Moving Bed to Chair 4   Standing Up From Chair 4   Walk in Room 4   Climb 3-5 Stairs 3   Basic Mobility Inpatient Raw Score 23   Basic Mobility Standardized Score 50 88   Highest Level Of Mobility   -[facility] Goal 7: Walk 25 feet or more   Eric Bell, PT, DPT

## 2022-04-23 NOTE — ASSESSMENT & PLAN NOTE
Lab Results   Component Value Date    EGFR 7 04/22/2022    EGFR 7 04/22/2022    EGFR 13 04/15/2022    CREATININE 6 51 (H) 04/22/2022    CREATININE 6 76 (H) 04/22/2022    CREATININE 4 21 (H) 04/15/2022   Patient with progression of stage IV CKD/acute kidney injury  -management as under acute kidney injury  -hold diuretics for now pending nephrology consult  -patient continues to make urine although has been decreasing in amount over the past 2 days

## 2022-04-23 NOTE — ASSESSMENT & PLAN NOTE
Wt Readings from Last 3 Encounters:   04/22/22 92 5 kg (204 lb)   04/18/22 92 2 kg (203 lb 4 2 oz)   04/14/22 93 kg (205 lb)     Patient with history of chronic diastolic failure, will obtain repeat echo during this admission  -holding diuretics until assessed by Nephrology

## 2022-04-23 NOTE — ASSESSMENT & PLAN NOTE
Patient with history of CKD for awaiting AV fistula placement for future dialysis  -unfortunately over the past several weeks has had worsening creatinine now elevated up to 6  -patient has been taking metolazone twice a week , along with daily 40 mg torsemide in the morning and 20 mg torsemide in the p m, for lower extremity edema that has unfortunately been nonresponsive to treatment  -hold all nephrotoxins  -urinalysis - bland, protein creatinine ratio -2 33  -nephrology consult    4/23-appreciate urology recommendations, have initiated Lasix infusion and will monitor for improvement  Consider PermCath placement in 24-48 hours if does not improve with Lasix and supportive care  Nephrology note, patient has consented to dialysis if needed

## 2022-04-23 NOTE — PLAN OF CARE
Problem: PAIN - ADULT  Goal: Verbalizes/displays adequate comfort level or baseline comfort level  Description: Interventions:  - Encourage patient to monitor pain and request assistance  - Assess pain using appropriate pain scale  - Administer analgesics based on type and severity of pain and evaluate response  - Implement non-pharmacological measures as appropriate and evaluate response  - Consider cultural and social influences on pain and pain management  - Notify physician/advanced practitioner if interventions unsuccessful or patient reports new pain  Outcome: Progressing     Problem: INFECTION - ADULT  Goal: Absence or prevention of progression during hospitalization  Description: INTERVENTIONS:  - Assess and monitor for signs and symptoms of infection  - Monitor lab/diagnostic results  - Monitor all insertion sites, i e  indwelling lines, tubes, and drains  - Monitor endotracheal if appropriate and nasal secretions for changes in amount and color  - [place] appropriate cooling/warming therapies per order  - Administer medications as ordered  - Instruct and encourage patient and family to use good hand hygiene technique  - Identify and instruct in appropriate isolation precautions for identified infection/condition  Outcome: Progressing  Goal: Absence of fever/infection during neutropenic period  Description: INTERVENTIONS:  - Monitor WBC    Outcome: Progressing     Problem: DISCHARGE PLANNING  Goal: Discharge to home or other facility with appropriate resources  Description: INTERVENTIONS:  - Identify barriers to discharge w/patient and caregiver  - Arrange for needed discharge resources and transportation as appropriate  - Identify discharge learning needs (meds, wound care, etc )  - Arrange for interpretive services to assist at discharge as needed  - Refer to Case Management Department for coordinating discharge planning if the patient needs post-hospital services based on physician/advanced practitioner order or complex needs related to functional status, cognitive ability, or social support system  Outcome: Progressing

## 2022-04-23 NOTE — ASSESSMENT & PLAN NOTE
Patient with history of paroxysmal Afib  -rate control with Lopressor 50 mg b i d   -anticoagulation with Eliquis 5 mg b i d-does not meet criteria for dose reduction

## 2022-04-23 NOTE — ASSESSMENT & PLAN NOTE
Lab Results   Component Value Date    HGBA1C 6 0 (H) 02/18/2022       Recent Labs     04/22/22  2316 04/23/22  0745   POCGLU 172* 98       Blood Sugar Average: Last 72 hrs:  (P) 135 substitute Lantus 20 units at bedtime for home insulin  Blood sugar checks his meal sliding scale

## 2022-04-23 NOTE — ASSESSMENT & PLAN NOTE
Patient with history of CKD for awaiting AV fistula placement for future dialysis  -fortunately over the past several weeks has had worsening creatinine now elevated up to 6  -patient has been taking metolazone twice a week , along with daily 40 mg torsemide in the morning and 20 mg torsemide in the p m, for lower extremity edema that has unfortunately been nonresponsive to treatment  -hold all nephrotoxins  -urinalysis pending, protein creatinine ratio pending  -nephrology consult

## 2022-04-23 NOTE — ASSESSMENT & PLAN NOTE
Lab Results   Component Value Date    EGFR 8 04/23/2022    EGFR 7 04/22/2022    EGFR 7 04/22/2022    CREATININE 6 28 (H) 04/23/2022    CREATININE 6 51 (H) 04/22/2022    CREATININE 6 76 (H) 04/22/2022   Patient with progression of stage IV CKD/acute kidney injury  -management as under acute kidney injury  -Lasix infusion started by Nephrology  -patient continues to make urine although has been decreasing in amount over the past 2 days

## 2022-04-23 NOTE — PLAN OF CARE
Problem: PAIN - ADULT  Goal: Verbalizes/displays adequate comfort level or baseline comfort level  Description: Interventions:  - Encourage patient to monitor pain and request assistance  - Assess pain using appropriate pain scale  - Administer analgesics based on type and severity of pain and evaluate response  - Implement non-pharmacological measures as appropriate and evaluate response  - Consider cultural and social influences on pain and pain management  - Notify physician/advanced practitioner if interventions unsuccessful or patient reports new pain  Outcome: Progressing     Problem: SAFETY ADULT  Goal: Patient will remain free of falls  Description: INTERVENTIONS:  - Educate patient/family on patient safety including physical limitations  - Instruct patient to call for assistance with activity   - Consult OT/PT to assist with strengthening/mobility   - Keep Call bell within reach  - Keep bed low and locked with side rails adjusted as appropriate  - Keep care items and personal belongings within reach  - Initiate and maintain comfort rounds  - Make Fall Risk Sign visible to staff  -- Consider moving patient to room near nurses station  Outcome: Progressing     Problem: Potential for Falls  Goal: Patient will remain free of falls  Description: INTERVENTIONS:  - Educate patient/family on patient safety including physical limitations  - Instruct patient to call for assistance with activity   - Consult OT/PT to assist with strengthening/mobility   - Keep Call bell within reach  - Keep bed low and locked with side rails adjusted as appropriate  - Keep care items and personal belongings within reach  - Initiate and maintain comfort rounds  - Make Fall Risk Sign visible to staff  - Consider moving patient to room near nurses station  Outcome: Progressing

## 2022-04-23 NOTE — H&P
[address] [facility]  H&P- Shira Spikes 1955, 74 y o  male MRN: [id number]  Unit/Bed#: ED 21 Encounter: [id number]  Primary Care Provider: Nadine Mckeon DO   Date and time admitted to hospital: 4/22/2022  5:30 PM    ELZBIETA (acute kidney injury) [facility])  Assessment & Plan  Patient with history of CKD for awaiting AV fistula placement for future dialysis  -fortunately over the past several weeks has had worsening creatinine now elevated up to 6  -patient has been taking metolazone twice a week , along with daily 40 mg torsemide in the morning and 20 mg torsemide in the p m, for lower extremity edema that has unfortunately been nonresponsive to treatment  -hold all nephrotoxins  -urinalysis pending, protein creatinine ratio pending  -nephrology consult    Chronic obstructive pulmonary disease ([facility] )  Assessment & Plan  Patient with noted history of COPD, comfortable on room air saturating over 96%    Hyponatremia  Assessment & Plan  Secondary to diuretics and worsening kidney function      Chronic diastolic (congestive) heart failure ([facility] )  Assessment & Plan  Wt Readings from Last 3 Encounters:   04/22/22 92 5 kg (204 lb)   04/18/22 92 2 kg (203 lb 4 2 oz)   04/14/22 93 kg (205 lb)     Patient with history of chronic diastolic failure, will obtain repeat echo during this admission  -holding diuretics until assessed by Nephrology        Stage 4 chronic kidney disease [facility])  Assessment & Plan  Lab Results   Component Value Date    EGFR 7 04/22/2022    EGFR 7 04/22/2022    EGFR 13 04/15/2022    CREATININE 6 51 (H) 04/22/2022    CREATININE 6 76 (H) 04/22/2022    CREATININE 4 21 (H) 04/15/2022   Patient with progression of stage IV CKD/acute kidney injury  -management as under acute kidney injury  -hold diuretics for now pending nephrology consult  -patient continues to make urine although has been decreasing in amount over the past 2 days    Type 2 diabetes mellitus with chronic kidney disease, with long-term current use of insulin ([facility] )  Assessment & Plan  Lab Results   Component Value Date    HGBA1C 6 0 (H) 02/18/2022       No results for input(s): POCGLU in the last 72 hours  Blood Sugar Average: Last 72 hrs:   substitute Lantus 20 units at bedtime for home insulin  Blood sugar checks his meal sliding scale    NICOLASA (obstructive sleep apnea)  Assessment & Plan  Patient with history of NICOLASA compliant with CPAP    PAF (paroxysmal atrial fibrillation) ([facility])  Assessment & Plan  Patient with history of paroxysmal Afib  -rate control with Lopressor 50 mg b i d   -anticoagulation with Eliquis 5 mg b i d-does not meet criteria for dose reduction    Hyperlipidemia  Assessment & Plan  Continue statin for hyperlipidemia, patient denies any myalgias, LFTs within normal limits    Gout  Assessment & Plan  Noted history of gout will hold allopurinol for now        VTE Pharmacologic Prophylaxis: VTE Score: 6 High Risk (Score >/= 5) - Pharmacological DVT Prophylaxis Ordered: apixaban (Eliquis)  Sequential Compression Devices Ordered  Code Status: Level 1 - Full Code   Discussion with family: Updated  (wife) at bedside  Anticipated Length of Stay: Patient will be admitted on an inpatient basis with an anticipated length of stay of greater than 2 midnights secondary to Acute kidney injury/acute on chronic renal failure  Total Time for Visit, including Counseling / Coordination of Care: 45 minutes Greater than 50% of this total time spent on direct patient counseling and coordination of care  Chief Complaint:  Worsening creatinine    History of Present Illness:  Concepcion Jason is a 74 y o  male with a  past medical history of CKD stage 4, diastolic heart failure, paroxysmal AFib on Eliquis, NICOLASA on CPAP, hyperlipidemia, type 2 diabetes on insulin, hypertension, sigmoid cancer, presenting for evaluation of worsening creatinine as noted on outpatient lab work    Patient is currently undergoing chemotherapy for sigmoid cancer which has recently been held due to worsening creatinine  Patient has been following with Nephrology as an outpatient with a creatinine ranging from 3-4 which has worsened up to 6 over the past several weeks  Patient has had worsening lower extremity edema which has not responded to torsemide 40 mg a m  And 20 mg p m  Over the past few weeks the patient is overall a gist added metolazone once to twice weekly  Unfortunately this is not helped the patient in terms of his lower extremity swelling and shortness of breath  Recent lab work shows increasing creatinine up to 6  Patient reports he is making less urine over the past several days  Denies any dysuria urgency frequency hematuria fever chills nausea vomiting chest pain shortness of breath palpitations  He is pending AV fistula placement next month for future dialysis  Review of Systems:  Review of Systems   Constitutional: Negative for chills, diaphoresis, fatigue and fever  HENT: Negative for ear pain and sore throat  Eyes: Negative for pain and visual disturbance  Respiratory: Negative for cough, shortness of breath, wheezing and stridor  Cardiovascular: Negative for chest pain, palpitations and leg swelling  Gastrointestinal: Negative for abdominal distention, abdominal pain, anal bleeding, diarrhea, nausea and vomiting  Endocrine: Negative for polydipsia, polyphagia and polyuria  Genitourinary: Negative for dysuria and hematuria  Musculoskeletal: Negative for arthralgias and back pain  Skin: Negative for color change, pallor, rash and wound  Neurological: Negative for dizziness, seizures, syncope, weakness, light-headedness, numbness and headaches  All other systems reviewed and are negative        Past Medical and Surgical History:   Past Medical History:   Diagnosis Date    Anemia     iron def    Arthritis     OA    Bruise of both arms     forearms and both hands    Bruises easily  [facility] 09/01/2021    colon    CH[facility]congestive [facility] failure) [facility]     Chronic kidney disease     CPAP (continuous positive airway pressure) dependence     Diabetes mellitus [facility]     Diabetic foot ulcer ([facility]     Eczema     Erectile dysfunction     Gout     Heart murmur     History of permanent cardiac pacemaker placement 11/2018    Hyperlipidemia     Hypertension     Hypoglycemia 3/8/2019    Murmur     Nephropathy     Osteoarthritis     Pacemaker 11/06/2018    PAD (peripheral artery disease) ([facility]     Seasonal allergies     Sleep apnea     Toe infection     bilat great toes    Vertigo     Walks frequently     Wears dentures     upper    Wears glasses        Past Surgical History:   Procedure Laterality Date    CARDIAC PACEMAKER PLACEMENT      CARPAL TUNNEL RELEASE Left     CARPAL TUNNEL RELEASE Right     CARPAL TUNNEL RELEASE      COLONOSCOPY  08/2020    COLONOSCOPY      FL GUIDED CENTRAL VENOUS ACCESS DEVICE INSERTION  1/11/2022    FOOT AMPUTATION Left 2/10/2020    Procedure: PARTIAL 1ST RAY AMPUTATION;  Surgeon: Carolyn Norris DPM;  [facility];  Service: Podiatry    INCISION AND DRAINAGE OF WOUND Left 1/12/2020    Procedure: INCISION AND DRAINAGE (I&D) EXTREMITY AND REMOVAL OF SESMOID BONE;  Surgeon: Dany Mejía DPM;  [facility];  Service: Podiatry    IR OTHER  1/21/2022    IR PICC REPOSITION  1/14/2020    KNEE ARTHROSCOPY Left     KNEE ARTHROSCOPY Right     KNEE SURGERY      OH AMPUTATION TOE,I-P JT Bilateral 5/2/2017    Procedure: PARTIAL AMPUTATION RIGHT AND LEFT HALLUX ;  Surgeon: Carolyn Norris DPM;  [facility];  Service: Podiatry    OH AMPUTATION TOE,MT-P JT Left 7/25/2017    Procedure: 2ND TOE AMPUTATION;  Surgeon: Carolyn Norris DPM;  [facility];  Service: Podiatry    OH INSJ TUNNELED CTR VAD W/SUBQ PORT AGE 5 YR/> N/A 1/11/2022    Procedure: INSERTION VENOUS PORT ( PORT-A-CATH) IR;  Surgeon: Frances Gonzalez DO; [facility];  Service: Interventional Radiology    RESECTION LOW ANTERIOR LAPAROSCOPIC N/A 10/21/2021    Procedure: RESECTION LOW ANTERIOR LAPAROSCOPIC;  Surgeon: Dixie Monsivais MD;  [facility];  Service: Colorectal    SHOULDER ARTHROSCOPY Left     with screws,RTC    SHOULDER SURGERY      TOE AMPUTATION Left 1/8/2020    Procedure: Katelin Player;  Surgeon: Keyla Maharaj DPM;  [facility];  Service: Podiatry    UPPER GASTROINTESTINAL ENDOSCOPY  03/2020    Intestinal metaplasia prepyloric    VASECTOMY         Meds/Allergies:  Prior to Admission medications    Medication Sig Start Date End Date Taking? Authorizing Provider   allopurinol (ZYLOPRIM) 300 mg tablet Take 1 tablet (300 mg total) by mouth every morning 3/24/22   Deep Nicholas,    amLODIPine (NORVASC) 10 mg tablet Take 1 tablet (10 mg total) by mouth daily 9/30/21   PAT Penaloza   apixaban (ELIQUIS) 5 mg Take 1 tablet (5 mg total) by mouth every 12 (twelve) hours 12/8/21 12/3/22  Krystian Abel MD   Dupilumab (Dupixent) 300 MG/2ML SOPN Inject 300 mg under the skin Once every 2 weeks    Historical Provider, MD   famotidine (PEPCID) 40 MG tablet TAKE 1 TABLET BY MOUTH EVERY DAY 8/4/21   Deep Nicholas DO   Insulin Degludec-Liraglutide (Xultophy) 100 units-3 6 mg/mL injection pen Inject 19 units daily   12/3/21   PAT Penaloza   Insulin Pen Needle (BD Pen Needle Zenaida U/F) 32G X 4 MM MISC Use 1 daily 2/1/22   PAT Penaolza   metolazone (ZAROXOLYN) 10 mg tablet Take 1 tablet (10 mg total) by mouth once a week 4/7/22   Maricarmen Lopes,    metoprolol tartrate (LOPRESSOR) 50 mg tablet Take 1 tablet (50 mg total) by mouth every 12 (twelve) hours 5/4/21   Maricarmen Asktory, DO   Multiple Vitamin (MULTIVITAMIN) tablet Take 1 tablet by mouth daily IN AM    Historical Provider, MD   omega-3-acid ethyl esters (LOVAZA) 1 g capsule Take 2 capsules (2 g total) by mouth 2 (two) times a day Mail print prescription to pt 12/10/21   PAT Bella   ondansetron [facility]) 8 mg tablet Take 1 tablet (8 mg total) by mouth every 8 (eight) hours as needed for nausea or vomiting 1/17/22   Bryant Cui MD   OneTouch Ultra test strip USE TO TEST BLOOD SUGAR 3 TIMES A DAY 1/5/21   [facility]    potassium chloride (K-DUR,KLOR-CON) 10 mEq tablet Take 2 tablets (20 mEq total) by mouth daily 4/15/22   PAT [place]   simvastatin (ZOCOR) 20 mg tablet Take 1 tablet (20 mg total) by mouth daily at bedtime 12/7/21   Reji Mi MD   sodium bicarbonate 650 mg tablet Take 1 tablet (650 mg total) by mouth 2 (two) times daily after meals 4/22/22   Chetan Stewart PA-C   tamsulosin (FLOMAX) 0 4 mg TAKE 1 CAPSULE BY MOUTH EVERY DAY WITH DINNER 6/13/21   [facility]   torsemide (DEMADEX) 20 mg tablet Take 2 tablets (40 mg total) by mouth 2 (two) times a day 40mg am and 20mg pm 4/7/22   Eladio Patton DO   glimepiride (AMARYL) 4 mg tablet TAKE 1 TABLET BY MOUTH TWICE A DAY  Patient taking differently: TAKE 1 TABLET BY MOUTH IN THE AM AND 1/2 TABLET IN PM 10/30/18 5/19/20  Ibis Nielson DO   sodium bicarbonate 650 mg tablet Take 1 tablet (650 mg total) by mouth 2 (two) times daily after meals 12/28/21 4/22/22  Jasmin Contreras DO     I have reviewed home medications with patient personally  Allergies: Allergies   Allergen Reactions    Invokana [Canagliflozin] Other (See Comments)     Caused circulation problems    Lamisil [Terbinafine] Blisters     Wife states " His skin peeled from head to toe"    Other Swelling     Pomegranate - facial swelling, no swelling of tongue, esophagus  Adhesive tape        Latex Rash       Social History:  Marital Status: /Civil Union   Occupation:   Patient Pre-hospital Living Situation: Home  Patient Pre-hospital Level of Mobility: walks  Patient Pre-hospital Diet Restrictions:   Substance Use History:   Social History     Substance and Sexual Activity   Alcohol Use Never    Alcohol/week: 0 0 standard drinks     Social History     Tobacco Use   Smoking Status Former Smoker    Packs/day: 0 50    Years: 20 00    Pack years: 10 00    Types: Cigarettes    Start date: 1    Quit date:     Years since quittin 3   Smokeless Tobacco Never Used   Tobacco Comment    quit 10 years ago     Social History     Substance and Sexual Activity   Drug Use No       Family History:  Family History   Problem Relation Age of Onset    Heart disease Father         passed away   Adenike Henderson Hypertension Father     Pulmonary embolism Father     Hypertension Mother         assed away       Physical Exam:     Vitals:   Blood Pressure: 140/64 (22)  Pulse: 89 (22)  Temperature: 97 7 °F (36 5 °C) (22)  Respirations: 16 (22)  Weight - Scale: 92 5 kg (204 lb) (22)  SpO2: 93 % (22)    Physical Exam  Vitals and nursing note reviewed  Constitutional:       General: He is not in acute distress  Appearance: He is well-developed  He is not ill-appearing, toxic-appearing or diaphoretic  HENT:      Head: Normocephalic and atraumatic  Eyes:      General: No scleral icterus  Conjunctiva/sclera: Conjunctivae normal    Cardiovascular:      Rate and Rhythm: Normal rate and regular rhythm  Heart sounds: Murmur heard  No friction rub  No gallop  Pulmonary:      Effort: Pulmonary effort is normal  No respiratory distress  Breath sounds: No stridor  No wheezing, rhonchi or rales  Chest:      Chest wall: No tenderness  Abdominal:      General: There is no distension  Palpations: Abdomen is soft  There is no mass  Tenderness: There is no abdominal tenderness  There is no guarding or rebound  Hernia: No hernia is present  Musculoskeletal:         General: No swelling, tenderness, deformity or signs of injury  Cervical back: Neck supple  Right lower leg: Edema present        Left lower leg: Edema present  Comments: Plus 2 pitting edema   Skin:     General: Skin is warm and dry  Coloration: Skin is not jaundiced or pale  Findings: No bruising, erythema, lesion or rash  Neurological:      Mental Status: He is alert and oriented to person, place, and time  Additional Data:     Lab Results:  Results from last 7 days   Lab Units 04/22/22  1759   WBC Thousand/uL 13 65*   HEMOGLOBIN g/dL 10 1*   HEMATOCRIT % 29 3*   PLATELETS Thousands/uL 320   NEUTROS PCT % 84*   LYMPHS PCT % 6*   MONOS PCT % 8   EOS PCT % 1     Results from last 7 days   Lab Units 04/22/22  1759   SODIUM mmol/L 128*   POTASSIUM mmol/L 3 7   CHLORIDE mmol/L 95*   CO2 mmol/L 22   BUN mg/dL 136*   CREATININE mg/dL 6 51*   ANION GAP mmol/L 11   CALCIUM mg/dL 9 2   ALBUMIN g/dL 3 0*   TOTAL BILIRUBIN mg/dL 0 77   ALK PHOS U/L 129*   ALT U/L 25   AST U/L 16   GLUCOSE RANDOM mg/dL 233*                       Imaging: No pertinent imaging reviewed  No orders to display       EKG and Other Studies Reviewed on Admission:   · EKG: No EKG obtained  ** Please Note: This note has been constructed using a voice recognition system   **

## 2022-04-23 NOTE — ED ATTENDING ATTESTATION
4/22/2022  Feng BASURTO DO, saw and evaluated the patient  I have discussed the patient with the resident/non-physician practitioner and agree with the resident's/non-physician practitioner's findings, Plan of Care, and MDM as documented in the resident's/non-physician practitioner's note, except where noted  All available labs and Radiology studies were reviewed  I was present for key portions of any procedure(s) performed by the resident/non-physician practitioner and I was immediately available to provide assistance  At this point I agree with the current assessment done in the Emergency Department  I have conducted an independent evaluation of this patient a history and physical is as follows:    15-year-old male presents for acute kidney injury  Patient had outpatient labs showed his creatinine is 6 7  Baseline is around 3 5  He is currently undergoing chemotherapy for sigmoid cancer  He feels fatigued and tired  He was recently changed to metolazone given possibility that he was retaining fluids  history of CHF AFib hypertension diabetes  His potassium is 3 6 on outpatient labs    No dialysis currently but was planning for an AV fistula be placed for dialysis possibly    Plan is to recheck labs admit to Medicine likely    ED Course         Critical Care Time  Procedures

## 2022-04-23 NOTE — ASSESSMENT & PLAN NOTE
Wt Readings from Last 3 Encounters:   04/22/22 96 7 kg (213 lb 3 oz)   04/18/22 92 2 kg (203 lb 4 2 oz)   04/14/22 93 kg (205 lb)     Patient with history of chronic diastolic failure, will obtain repeat echo during this admission  -holding diuretics until assessed by Nephrology

## 2022-04-24 LAB
ANION GAP SERPL CALCULATED.3IONS-SCNC: 11 MMOL/L (ref 4–13)
BASOPHILS # BLD AUTO: 0.04 THOUSANDS/ΜL (ref 0–0.1)
BASOPHILS NFR BLD AUTO: 0 % (ref 0–1)
BUN SERPL-MCNC: 125 MG/DL (ref 5–25)
CALCIUM SERPL-MCNC: 9.4 MG/DL (ref 8.3–10.1)
CHLORIDE SERPL-SCNC: 98 MMOL/L (ref 100–108)
CO2 SERPL-SCNC: 24 MMOL/L (ref 21–32)
CREAT SERPL-MCNC: 5.77 MG/DL (ref 0.6–1.3)
EOSINOPHIL # BLD AUTO: 0.19 THOUSAND/ΜL (ref 0–0.61)
EOSINOPHIL NFR BLD AUTO: 2 % (ref 0–6)
ERYTHROCYTE [DISTWIDTH] IN BLOOD BY AUTOMATED COUNT: 15.1 % (ref 11.6–15.1)
GFR SERPL CREATININE-BSD FRML MDRD: 9 ML/MIN/1.73SQ M
GLUCOSE SERPL-MCNC: 109 MG/DL (ref 65–140)
GLUCOSE SERPL-MCNC: 186 MG/DL (ref 65–140)
GLUCOSE SERPL-MCNC: 188 MG/DL (ref 65–140)
GLUCOSE SERPL-MCNC: 206 MG/DL (ref 65–140)
GLUCOSE SERPL-MCNC: 81 MG/DL (ref 65–140)
HCT VFR BLD AUTO: 26.9 % (ref 36.5–49.3)
HGB BLD-MCNC: 9.2 G/DL (ref 12–17)
IMM GRANULOCYTES # BLD AUTO: 0.09 THOUSAND/UL (ref 0–0.2)
IMM GRANULOCYTES NFR BLD AUTO: 1 % (ref 0–2)
LYMPHOCYTES # BLD AUTO: 0.95 THOUSANDS/ΜL (ref 0.6–4.47)
LYMPHOCYTES NFR BLD AUTO: 9 % (ref 14–44)
MCH RBC QN AUTO: 31.4 PG (ref 26.8–34.3)
MCHC RBC AUTO-ENTMCNC: 34.2 G/DL (ref 31.4–37.4)
MCV RBC AUTO: 92 FL (ref 82–98)
MONOCYTES # BLD AUTO: 1.15 THOUSAND/ΜL (ref 0.17–1.22)
MONOCYTES NFR BLD AUTO: 11 % (ref 4–12)
NEUTROPHILS # BLD AUTO: 7.9 THOUSANDS/ΜL (ref 1.85–7.62)
NEUTS SEG NFR BLD AUTO: 77 % (ref 43–75)
NRBC BLD AUTO-RTO: 0 /100 WBCS
PLATELET # BLD AUTO: 331 THOUSANDS/UL (ref 149–390)
PMV BLD AUTO: 10.3 FL (ref 8.9–12.7)
POTASSIUM SERPL-SCNC: 2.7 MMOL/L (ref 3.5–5.3)
POTASSIUM SERPL-SCNC: 3.9 MMOL/L (ref 3.5–5.3)
RBC # BLD AUTO: 2.93 MILLION/UL (ref 3.88–5.62)
SODIUM SERPL-SCNC: 133 MMOL/L (ref 136–145)
WBC # BLD AUTO: 10.32 THOUSAND/UL (ref 4.31–10.16)

## 2022-04-24 PROCEDURE — 84132 ASSAY OF SERUM POTASSIUM: CPT | Performed by: INTERNAL MEDICINE

## 2022-04-24 PROCEDURE — 99232 SBSQ HOSP IP/OBS MODERATE 35: CPT | Performed by: INTERNAL MEDICINE

## 2022-04-24 PROCEDURE — 85025 COMPLETE CBC W/AUTO DIFF WBC: CPT | Performed by: INTERNAL MEDICINE

## 2022-04-24 PROCEDURE — 80048 BASIC METABOLIC PNL TOTAL CA: CPT | Performed by: INTERNAL MEDICINE

## 2022-04-24 PROCEDURE — 82948 REAGENT STRIP/BLOOD GLUCOSE: CPT

## 2022-04-24 RX ORDER — POTASSIUM CHLORIDE 20 MEQ/1
40 TABLET, EXTENDED RELEASE ORAL
Status: DISPENSED | OUTPATIENT
Start: 2022-04-24 | End: 2022-04-24

## 2022-04-24 RX ORDER — POTASSIUM CHLORIDE 20 MEQ/1
40 TABLET, EXTENDED RELEASE ORAL
Status: COMPLETED | OUTPATIENT
Start: 2022-04-24 | End: 2022-04-24

## 2022-04-24 RX ADMIN — PRAVASTATIN SODIUM 40 MG: 40 TABLET ORAL at 17:21

## 2022-04-24 RX ADMIN — POTASSIUM CHLORIDE 40 MEQ: 20 TABLET, EXTENDED RELEASE ORAL at 09:24

## 2022-04-24 RX ADMIN — POTASSIUM CHLORIDE 40 MEQ: 1500 TABLET, EXTENDED RELEASE ORAL at 13:29

## 2022-04-24 RX ADMIN — METOPROLOL TARTRATE 50 MG: 50 TABLET, FILM COATED ORAL at 21:17

## 2022-04-24 RX ADMIN — SEVELAMER HYDROCHLORIDE 800 MG: 800 TABLET, FILM COATED PARENTERAL at 09:24

## 2022-04-24 RX ADMIN — SEVELAMER HYDROCHLORIDE 800 MG: 800 TABLET, FILM COATED PARENTERAL at 13:30

## 2022-04-24 RX ADMIN — TAMSULOSIN HYDROCHLORIDE 0.4 MG: 0.4 CAPSULE ORAL at 17:21

## 2022-04-24 RX ADMIN — POTASSIUM CHLORIDE 40 MEQ: 20 TABLET, EXTENDED RELEASE ORAL at 17:21

## 2022-04-24 RX ADMIN — APIXABAN 5 MG: 5 TABLET, FILM COATED ORAL at 17:21

## 2022-04-24 RX ADMIN — INSULIN LISPRO 1 UNITS: 100 INJECTION, SOLUTION INTRAVENOUS; SUBCUTANEOUS at 13:33

## 2022-04-24 RX ADMIN — INSULIN GLARGINE 10 UNITS: 100 INJECTION, SOLUTION SUBCUTANEOUS at 21:17

## 2022-04-24 RX ADMIN — Medication 20 MG/HR: at 13:29

## 2022-04-24 RX ADMIN — APIXABAN 5 MG: 5 TABLET, FILM COATED ORAL at 09:25

## 2022-04-24 RX ADMIN — POTASSIUM CHLORIDE 40 MEQ: 1500 TABLET, EXTENDED RELEASE ORAL at 13:32

## 2022-04-24 RX ADMIN — SODIUM BICARBONATE 650 MG TABLET 650 MG: at 09:24

## 2022-04-24 RX ADMIN — POTASSIUM CHLORIDE 40 MEQ: 20 TABLET, EXTENDED RELEASE ORAL at 13:30

## 2022-04-24 RX ADMIN — METOPROLOL TARTRATE 50 MG: 50 TABLET, FILM COATED ORAL at 09:25

## 2022-04-24 RX ADMIN — INSULIN LISPRO 1 UNITS: 100 INJECTION, SOLUTION INTRAVENOUS; SUBCUTANEOUS at 17:22

## 2022-04-24 RX ADMIN — SODIUM BICARBONATE 650 MG TABLET 650 MG: at 17:21

## 2022-04-24 RX ADMIN — SEVELAMER HYDROCHLORIDE 800 MG: 800 TABLET, FILM COATED PARENTERAL at 17:21

## 2022-04-24 NOTE — PROGRESS NOTES
[facility]  Progress Note - Satya Turpin 1955, 74 y o  male MRN: [id number]  Unit/Bed#: [facility] 905-01 Encounter: [id number]  Primary Care Provider: Yoni Dennis DO   Date and time admitted to hospital: 4/22/2022  5:30 PM    Chronic obstructive pulmonary disease [facility])  Assessment & Plan  Patient with noted history of COPD, comfortable on room air saturating over 96%    Hyponatremia  Assessment & Plan  Secondary to diuretics and worsening kidney function  Fluid restriction; improving      Chronic diastolic (congestive) heart failure (HCC)  Assessment & Plan  Wt Readings from Last 3 Encounters:   04/24/22 92 6 kg (204 lb 2 3 oz)   04/18/22 92 2 kg (203 lb 4 2 oz)   04/14/22 93 kg (205 lb)     Patient with history of chronic diastolic failure, will obtain repeat echo during this admission  -holding diuretics until assessed by Nephrology        Stage 4 chronic kidney disease [facility])  Assessment & Plan  Lab Results   Component Value Date    EGFR 9 04/24/2022    EGFR 8 04/23/2022    EGFR 7 04/22/2022    CREATININE 5 77 (H) 04/24/2022    CREATININE 6 28 (H) 04/23/2022    CREATININE 6 51 (H) 04/22/2022   Patient with progression of stage IV CKD/acute kidney injury  -management as under acute kidney injury  -Lasix infusion started by Nephrology  -patient continues to make urine although has been decreasing in amount over the past 2 days    Type 2 diabetes mellitus with chronic kidney disease, with long-term current use of insulin [facility])  Assessment & Plan  Lab Results   Component Value Date    HGBA1C 6 0 (H) 02/18/2022       Recent Labs     04/23/22  1121 04/23/22  1654 04/23/22  2047 04/24/22  0806   POCGLU 166* 225* 214* 109       Blood Sugar Average: Last 72 hrs:  (P) 164 substitute Lantus 20 units at bedtime for home insulin  Blood sugar checks his meal sliding scale    NICOLASA (obstructive sleep apnea)  Assessment & Plan  Patient with history of NICOLASA compliant with CPAP    PAF (paroxysmal atrial fibrillation) ([facility] [address] )  Assessment & Plan  Patient with history of paroxysmal Afib  -rate control with Lopressor 50 mg b i d   -anticoagulation with Eliquis 5 mg b i d-does not meet criteria for dose reduction    Hyperlipidemia  Assessment & Plan  Continue statin for hyperlipidemia, patient denies any myalgias, LFTs within normal limits    Gout  Assessment & Plan  Noted history of gout; will hold allopurinol for now    * ELZBIETA (acute kidney injury) [facility])  Assessment & Plan  Patient with history of CKD for awaiting AV fistula placement for future dialysis  -unfortunately over the past several weeks has had worsening creatinine now elevated up to 6  -patient has been taking metolazone twice a week , along with daily 40 mg torsemide in the morning and 20 mg torsemide in the p m, for lower extremity edema that has unfortunately been nonresponsive to treatment  -hold all nephrotoxins  -urinalysis - bland, protein creatinine ratio -2 33  -nephrology consult    4/23-appreciate urology recommendations, have initiated Lasix infusion and will monitor for improvement  Consider PermCath placement in 24-48 hours if does not improve with Lasix and supportive care  Nephrology note, patient has consented to dialysis if needed  VTE Pharmacologic Prophylaxis:   Pharmacologic: Apixaban (Eliquis)  Mechanical VTE Prophylaxis in Place: Yes    Patient Centered Rounds: I have performed bedside rounds with nursing staff today  Discussions with Specialists or Other Care Team Provider:     Education and Discussions with Family / Patient: Discussed treatment plan with family and patient who agree with current plan; encouraged to ask questions and participate  Time Spent for Care: 45 minutes  More than 50% of total time spent on counseling and coordination of care as described above      Current Length of Stay: 2 day(s)    Current Patient Status: Inpatient   Certification Statement: The patient will continue to require additional inpatient hospital stay due to Treatment of ELZBIETA    Discharge Plan: To be determined    Code Status: Level 1 - Full Code      Subjective:   Patient seen examined bedside, no acute distress or discomfort noted  2+ pitting edema on lower extremities; saturating well on room air  Creatinine dropped to 5 77 today  Await further Nephrology recommendations  Replete potassium and placed on telemetry for K of 2 7  May need PermCath moving forward  Objective:     Vitals:   Temp (24hrs), Av °F (36 7 °C), Min:97 6 °F (36 4 °C), Max:98 4 °F (36 9 °C)    Temp:  [97 6 °F (36 4 °C)-98 4 °F (36 9 °C)] 97 9 °F (36 6 °C)  HR:  [59-65] 62  Resp:  [17-18] 18  BP: (130-137)/(55-61) 137/55  SpO2:  [93 %-94 %] 93 %  Body mass index is 29 29 kg/m²  Input and Output Summary (last 24 hours): Intake/Output Summary (Last 24 hours) at 2022 1141  Last data filed at 2022 0449  Gross per 24 hour   Intake 50 ml   Output 2250 ml   Net -2200 ml       Physical Exam:     Physical Exam  Vitals and nursing note reviewed  Constitutional:       Appearance: He is well-developed  HENT:      Head: Normocephalic and atraumatic  Eyes:      Conjunctiva/sclera: Conjunctivae normal    Cardiovascular:      Rate and Rhythm: Normal rate and regular rhythm  Heart sounds: Murmur heard  Pulmonary:      Effort: Pulmonary effort is normal  No respiratory distress  Abdominal:      Palpations: Abdomen is soft  Tenderness: There is no abdominal tenderness  Musculoskeletal:      Cervical back: Neck supple  Right lower leg: Edema present  Left lower leg: Edema present  Skin:     General: Skin is warm and dry  Neurological:      Mental Status: He is alert and oriented to person, place, and time     Psychiatric:         Behavior: Behavior normal            Additional Data:     Labs:    Results from last 7 days   Lab Units 22  0448   WBC Thousand/uL 10 32*   HEMOGLOBIN g/dL 9 2* HEMATOCRIT % 26 9*   PLATELETS Thousands/uL 331   NEUTROS PCT % 77*   LYMPHS PCT % 9*   MONOS PCT % 11   EOS PCT % 2     Results from last 7 days   Lab Units 04/24/22  0448 04/23/22  0524 04/22/22  1759   SODIUM mmol/L 133*   < > 128*   POTASSIUM mmol/L 2 7*   < > 3 7   CHLORIDE mmol/L 98*   < > 95*   CO2 mmol/L 24   < > 22   BUN mg/dL 125*   < > 136*   CREATININE mg/dL 5 77*   < > 6 51*   ANION GAP mmol/L 11   < > 11   CALCIUM mg/dL 9 4   < > 9 2   ALBUMIN g/dL  --   --  3 0*   TOTAL BILIRUBIN mg/dL  --   --  0 77   ALK PHOS U/L  --   --  129*   ALT U/L  --   --  25   AST U/L  --   --  16   GLUCOSE RANDOM mg/dL 81   < > 233*    < > = values in this interval not displayed  Results from last 7 days   Lab Units 04/24/22  1124 04/24/22  0806 04/23/22  2047 04/23/22  1654 04/23/22  1121 04/23/22  0745 04/22/22  2316   POC GLUCOSE mg/dl 188* 109 214* 225* 166* 98 172*                   * I Have Reviewed All Lab Data Listed Above  * Additional Pertinent Lab Tests Reviewed:  All Labs Within Last 24 Hours Reviewed    Imaging:    Imaging Reports Reviewed Today Include:   Imaging Personally Reviewed by Myself Includes:      Recent Cultures (last 7 days):           Last 24 Hours Medication List:   Current Facility-Administered Medications   Medication Dose Route Frequency Provider Last Rate    apixaban  5 mg Oral BID Bro Duran DO      furosemide  20 mg/hr Intravenous Continuous Mateusz Jamison DO 20 mg/hr (04/23/22 1129)    insulin glargine  10 Units Subcutaneous HS Bro Duran, DO      insulin lispro  1-5 Units Subcutaneous TID AC Bro Duran DO      metoprolol tartrate  50 mg Oral Q12H [address] Bro Renee, DO      ondansetron  4 mg Intravenous Q6H PRN Bro Duran, DO      potassium chloride  40 mEq Oral TID With Meals Mateusz Jamison, DO      potassium chloride  40 mEq Oral Q2H Gris Dugan MD      pravastatin  40 mg Oral Daily With [employer], DO      sevelamer  800 mg Oral TID With Meals Mateusz Jamison, DO      sodium bicarbonate  650 mg Oral BID after meals Bro Duran, DO      tamsulosin  0 4 mg Oral Daily With Pennie Mccloud DO          Today, Patient Was Seen By: Gerri Shine MD    ** Please Note: Dictation voice to text software may have been used in the creation of this document   **

## 2022-04-24 NOTE — ASSESSMENT & PLAN NOTE
Lab Results   Component Value Date    EGFR 9 04/24/2022    EGFR 8 04/23/2022    EGFR 7 04/22/2022    CREATININE 5 77 (H) 04/24/2022    CREATININE 6 28 (H) 04/23/2022    CREATININE 6 51 (H) 04/22/2022   Patient with progression of stage IV CKD/acute kidney injury  -management as under acute kidney injury  -Lasix infusion started by Nephrology  -patient continues to make urine although has been decreasing in amount over the past 2 days

## 2022-04-24 NOTE — PROGRESS NOTES
NEPHROLOGY PROGRESS NOTE   Sharda Mean 74 y o  male MRN: [id number]  Unit/Bed#: [facility] 905-01 Encounter: [id number]  Reason for Consult:  Acute kidney injury    Assessment/Plan:  1  Acute kidney injury, etiology multifactorial could be component volume overload versus underlying progression his disease  2  Chronic kidney disease, previous baseline creatinine recently between 3 4-4 0  EGFR between 14 and 17  3  Hypokalemia, recommend replacement 40 mEq 3 times daily x3 doses  4  Volume overload with evidence of pulmonary edema and increasing lower extremity swelling  Given advanced CKD, would place on Lasix infusion with metolazone as needed  5  Sigmoid colon malignancy, following closely with Hematology Oncology, previously on 5 FU and Leucovorin, chemotherapy now currently on hold given worsening renal dysfunction  6  Anemia of chronic kidney disease hemoglobin currently 9 9  7  CKD associated mineral bone disorder, phosphorus elevated at 5 9, would initiate phosphate binders  8  Metabolic acidosis, continue with sodium bicarbonate therapy for now       PLAN:  · Renal function overall has slightly improved from a peak creatinine of 6 7 to 5 7  · Continue with Lasix infusion patient with good urine output  · Replace potassium  · No changes in current regimen  · Again given patient's advanced CKD, will likely need dialysis in the very near future  Consent obtained, placed on chart  SUBJECTIVE:  Seen and examined  Patient doing much better  No further abdominal bloating  Decreasing lower extremity swelling  Denies any chest pain shortness of breath      Review of Systems    OBJECTIVE:  Current Weight: Weight - Scale: 92 6 kg (204 lb 2 3 oz)  Vitals:    04/23/22 1651 04/23/22 2158 04/24/22 0550 04/24/22 0736   BP: 134/60 130/60  137/55   Pulse: 59 60  62   Resp:  17  18   Temp: 98 4 °F (36 9 °C) 97 6 °F (36 4 °C)  97 9 °F (36 6 °C)   TempSrc:    Oral   SpO2: 93% 94%  93%   Weight:   92 6 kg (204 lb 2 3 oz) Height:           Intake/Output Summary (Last 24 hours) at 4/24/2022 1203  Last data filed at 4/24/2022 0449  Gross per 24 hour   Intake 50 ml   Output 1525 ml   Net -1475 ml       Physical Exam  Constitutional:       Appearance: He is not ill-appearing  HENT:      Head: Normocephalic and atraumatic  Eyes:      General: No scleral icterus  Cardiovascular:      Rate and Rhythm: Normal rate and regular rhythm  Pulmonary:      Effort: Pulmonary effort is normal       Breath sounds: Normal breath sounds  Abdominal:      General: There is no distension  Palpations: Abdomen is soft  Musculoskeletal:      Right lower leg: Edema present  Left lower leg: Edema present  Skin:     General: Skin is warm and dry  Neurological:      Mental Status: He is alert and oriented to person, place, and time           Medications:    Current Facility-Administered Medications:     apixaban (ELIQUIS) tablet 5 mg, 5 mg, Oral, BID, Bro Duran DO, 5 mg at 04/24/22 0925    furosemide (LASIX) 500 mg infusion 50 mL, 20 mg/hr, Intravenous, Continuous, Hakeem Jamison DO, Last Rate: 2 mL/hr at 04/23/22 1129, 20 mg/hr at 04/23/22 1129    insulin glargine (LANTUS) subcutaneous injection 10 Units 0 1 mL, 10 Units, Subcutaneous, HS, Bro Duran DO, 10 Units at 04/23/22 2118    insulin lispro (HumaLOG) 100 units/mL subcutaneous injection 1-5 Units, 1-5 Units, Subcutaneous, TID AC, 2 Units at 04/23/22 1657 **AND** Fingerstick Glucose (POCT), , , TID AC, Bro Duarn DO    metoprolol tartrate (LOPRESSOR) tablet 50 mg, 50 mg, Oral, Q12H [address], Bro Duran DO, 50 mg at 04/24/22 0925    ondansetron (ZOFRAN) injection 4 mg, 4 mg, Intravenous, Q6H PRN, Bro Duran DO    potassium chloride (K-DUR,KLOR-CON) CR tablet 40 mEq, 40 mEq, Oral, TID With Meals, Hakeem Jamison DO, 40 mEq at 04/24/22 5930    potassium chloride (K-DUR,KLOR-CON) CR tablet 40 mEq, 40 mEq, Oral, Q2H, Igor Laws MD    pravastatin (PRAVACHOL) tablet 40 mg, 40 mg, Oral, Daily With Dinner, Bro Duran, DO, 40 mg at 04/23/22 1654    sevelamer (RENAGEL) tablet 800 mg, 800 mg, Oral, TID With Meals, Angelika Jamison, DO, 800 mg at 04/24/22 2527    sodium bicarbonate tablet 650 mg, 650 mg, Oral, BID after meals, Bro Elilucía, DO, 650 mg at 04/24/22 0924    tamsulosin (FLOMAX) capsule 0 4 mg, 0 4 mg, Oral, Daily With Laverna Franken, DO, 0 4 mg at 04/23/22 1654    Laboratory Results:  Results from last 7 days   Lab Units 04/24/22  0448 04/23/22  0524 04/22/22  1759 04/22/22  0858   WBC Thousand/uL 10 32* 13 90* 13 65* 11 93*   HEMOGLOBIN g/dL 9 2* 9 9* 10 1* 9 8*   HEMATOCRIT % 26 9* 29 0* 29 3* 29 4*   PLATELETS Thousands/uL 331 366 320 350   POTASSIUM mmol/L 2 7* 3 5 3 7 3 6   CHLORIDE mmol/L 98* 97* 95* 95*   CO2 mmol/L 24 23 22 22   BUN mg/dL 125* 128* 136* 136*   CREATININE mg/dL 5 77* 6 28* 6 51* 6 76*   CALCIUM mg/dL 9 4 9 2 9 2 9 0   MAGNESIUM mg/dL  --  2 4  --  2 3   PHOSPHORUS mg/dL  --  5 9*  --   --

## 2022-04-24 NOTE — ASSESSMENT & PLAN NOTE
Lab Results   Component Value Date    HGBA1C 6 0 (H) 02/18/2022       Recent Labs     04/23/22  1121 04/23/22  1654 04/23/22 2047 04/24/22  0806   POCGLU 166* 225* 214* 109       Blood Sugar Average: Last 72 hrs:  (P) 164 substitute Lantus 20 units at bedtime for home insulin  Blood sugar checks his meal sliding scale

## 2022-04-24 NOTE — PROGRESS NOTES
Patient refused to allow dressing to be taken off for assessment  Stated "doctor did dressing change on Friday and not to touch it for 3 - 4 days

## 2022-04-24 NOTE — CONSULTS
[address] Podiatry - Consultation      Patient Information:   Raul Eng 74 y o  male MRN: [id number]  Unit/Bed#: [facility] 905-01 Encounter: [id number]  PCP: Filomena Henderson DO  Date of Admission:  4/22/2022  Date of Consultation: 04/24/22  Requesting Physician: Jen Ordonez MD      ASSESSMENT:    Raul Eng is a 74 y o  male with:    1  Right hallux Pagan 2  2  Left 1st Metatarsal head Pagan 2  3  Type 2 diabetes mellitus   4  ELZBIETA and CKD 4    PLAN:    · Local wound care for foot ulcers  · Wound care: dermagran DSD MWF  · Offload with toe unloading shoes  · PT/OT consulted  · Rest of care per primary team     Weight Bearing Status: WBAT    SUBJECTIVE    History of Present Illness:    Raul Eng is a 74 y o  male with past medical history significant for bilateral ulcerations, T2Dm and CKD, is admitted for ELZBIETA  Podiatry is consulted for foot ulcerations  Patient states that he has had these ulcerations for a long time more than a year  He states that he follows every week with Dr Abel Martinez in his office for wound care  He says that at one point had the wounds heal up but as a complication of his chemotherapy for his Colon cancer it caused the wounds on his feet to open back up  He does daily dressing changes himself with bandaids and daily washes  Denies any fever, chills, nausea, and vomiting  States that he hasn't had any drainage from the wounds  Review of Systems:    Constitutional: Negative  HENT: Negative  Eyes: Negative  Respiratory: Negative  Cardiovascular: Negative  Gastrointestinal: Negative  Musculoskeletal: b/l toe amputations   Skin:ulcerations bilateral   Neurological: diminished sensation   Psych: Negative       Past Medical and Surgical History:     Past Medical History:   Diagnosis Date    Anemia     iron def    Arthritis     OA    Bruise of both arms     forearms and both hands    Bruises easily     Cancer ([address] ) 09/01/2021    colon    [facility]congestive heart failure) [facility])     Chronic kidney disease     CPAP (continuous positive airway pressure) dependence     Diabetes mellitus ([facility] [address] )     Diabetic foot ulcer ([facility] [address] )     Eczema     Erectile dysfunction     Gout     Heart murmur     History of permanent cardiac pacemaker placement 11/2018    Hyperlipidemia     Hypertension     Hypoglycemia 3/8/2019    Murmur     Nephropathy     Osteoarthritis     Pacemaker 11/06/2018    PAD [facility]peripheral artery disease) ([facility])     Seasonal allergies     Sleep apnea     Toe infection     bilat great toes    Vertigo     Walks frequently     Wears dentures     upper    Wears glasses        Past Surgical History:   Procedure Laterality Date    CARDIAC PACEMAKER PLACEMENT      CARPAL TUNNEL RELEASE Left     CARPAL TUNNEL RELEASE Right     CARPAL TUNNEL RELEASE      COLONOSCOPY  08/2020    COLONOSCOPY      FL GUIDED CENTRAL VENOUS ACCESS DEVICE INSERTION  1/11/2022    FOOT AMPUTATION Left 2/10/2020    Procedure: PARTIAL 1ST RAY AMPUTATION;  Surgeon: Natalie Mera DPM;  [facility];  Service: Podiatry    INCISION AND DRAINAGE OF WOUND Left 1/12/2020    Procedure: INCISION AND DRAINAGE (I&D) EXTREMITY AND REMOVAL OF SESMOID BONE;  Surgeon: Bernardo Ramires DPM;  [facility];  Service: Podiatry    IR OTHER  1/21/2022    IR PICC REPOSITION  1/14/2020    KNEE ARTHROSCOPY Left     KNEE ARTHROSCOPY Right     KNEE SURGERY      [place] AMPUTATION TOE,I-P JT Bilateral 5/2/2017    Procedure: PARTIAL AMPUTATION RIGHT AND LEFT HALLUX ;  Surgeon: Natalie Mera DPM;  [facility];  Service: Podiatry    [place] AMPUTATION TOE,MT-P JT Left 7/25/2017    Procedure: 2ND TOE AMPUTATION;  Surgeon: Natalie Mera DPM;  [facility];  Service: Podiatry    [place] INSJ TUNNELED CTR VAD W/SUBQ PORT AGE 5 YR/> N/A 1/11/2022    Procedure: INSERTION VENOUS PORT ( PORT-A-CATH) IR;  Surgeon: Tamera Buerger, DO;  [facility];  Service: Interventional Radiology    RESECTION LOW ANTERIOR LAPAROSCOPIC N/A 10/21/2021    Procedure: RESECTION LOW ANTERIOR LAPAROSCOPIC;  Surgeon: Fer Amaya MD;  [facility];  Service: Colorectal    SHOULDER ARTHROSCOPY Left     with screws,RTC    SHOULDER SURGERY      TOE AMPUTATION Left 1/8/2020    Procedure: HALLUX AMPUTATION;  Surgeon: Charu Younger DPM;  [facility];  Service: Podiatry    UPPER GASTROINTESTINAL ENDOSCOPY  03/2020    Intestinal metaplasia prepyloric    VASECTOMY         Meds/Allergies:    Medications Prior to Admission   Medication    allopurinol (ZYLOPRIM) 300 mg tablet    amLODIPine (NORVASC) 10 mg tablet    apixaban (ELIQUIS) 5 mg    Dupilumab (Dupixent) 300 MG/2ML SOPN    famotidine (PEPCID) 40 MG tablet    Insulin Degludec-Liraglutide (Xultophy) 100 units-3 6 mg/mL injection pen    Insulin Pen Needle (BD Pen Needle Zenaida U/F) 32G X 4 MM MISC    metolazone (ZAROXOLYN) 10 mg tablet    metoprolol tartrate (LOPRESSOR) 50 mg tablet    Multiple Vitamin (MULTIVITAMIN) tablet    omega-3-acid ethyl esters (LOVAZA) 1 g capsule    ondansetron (ZOFRAN) 8 mg tablet    OneTouch Ultra test strip    potassium chloride (K-DUR,KLOR-CON) 10 mEq tablet    simvastatin (ZOCOR) 20 mg tablet    sodium bicarbonate 650 mg tablet    tamsulosin (FLOMAX) 0 4 mg    torsemide (DEMADEX) 20 mg tablet       Allergies   Allergen Reactions    Invokana [Canagliflozin] Other (See Comments)     Caused circulation problems    Lamisil [Terbinafine] Blisters     Wife states " His skin peeled from head to toe"    Other Swelling     Pomegranate - facial swelling, no swelling of tongue, esophagus  Adhesive tape        Latex Rash       Social History:     Marital Status: /Civil Union    Substance Use History:   Social History     Substance and Sexual Activity   Alcohol Use Never    Alcohol/week: 0 0 standard drinks     Social History     Tobacco Use   Smoking Status Former Smoker    Packs/day: 0 50    Years: 20 00    Pack years: 10 00    Types: Cigarettes    Start date: 1    Quit date:     Years since quittin 3   Smokeless Tobacco Never Used   Tobacco Comment    quit 10 years ago     Social History     Substance and Sexual Activity   Drug Use No       Family History:    Family History   Problem Relation Age of Onset    Heart disease Father         passed away   [place] Hypertension Father     Pulmonary embolism Father     Hypertension Mother         assed away         OBJECTIVE:    Vitals:   Blood Pressure: 135/54 (22 1436)  Pulse: 60 (22 1436)  Temperature: 97 9 °F (36 6 °C) (22 0736)  Temp Source: Oral (22 0736)  Respirations: 18 (22 0736)  Height: 5' 10" (177 8 cm) (22 1335)  Weight - Scale: 92 6 kg (204 lb 2 3 oz) (22 0550)  SpO2: 96 % (22 1436)    Physical Exam:     General Appearance: Alert, cooperative, no distress  HEENT: Head normocephalic, atraumatic, without obvious abnormality  Heart: Normal rate and rhythm  Lungs: Non-labored breathing  No respiratory distress  Abdomen: Without distension  Psychiatric: AAOx3  Lower Extremity:  Vascular:   DP/PT pulses are palpable bilaterally  Capillary refill is <3 sec  Edema is negative bilateral    Musculoskeletal:  5/5 strength to the deep compartments of the legs bilateral  Multiple amputations noted bilateral  No tenderness on palpation of foot or ankle bilateral  Tendons and fascia are smooth without palpable dell    Neurological:  Epicritic sensation is diminished b/l  Protective sensation is diminished b/l with monofilament  Vibratory sensation is diminished b/l    Dermatological:  Skin is smooth supple with normal texture and turgor  Nails are to an appropriate length without thickening or discoloration    No erythema, heat, swelling, erythema or pain bilateral        Wounds and Ulcerations noted as follows:    Wound #: 1  Location: sub right hallux  Size: 1 0 x 0 5 x 0 1cm   Deepest Tissue Noted in Base: subcutaneous  Probe to Bone: negative  Peripheral Skin Description: attached macerated  Granulation: 100% Fibrotic Tissue: 0% Necrotic Tissue: 0%   Drainage Amount: negative  Signs of Infection: negative    Wound #: 1  Location: sub left 1st [place]  Size: 0 8 x 0 6 x 0 2   Deepest Tissue Noted in Base: subcutaneous  Probe to Bone: neg  Peripheral Skin Description: attached  Granulation: 100% Fibrotic Tissue: 0% Necrotic Tissue: 0%   Drainage Amount: negative  Signs of Infection: negative      Clinical Images 04/24/22: Additional Data:     Lab Results: I have personally reviewed pertinent labs including:    Results from last 7 days   Lab Units 04/24/22  0448   WBC Thousand/uL 10 32*   HEMOGLOBIN g/dL 9 2*   HEMATOCRIT % 26 9*   PLATELETS Thousands/uL 331   NEUTROS PCT % 77*   LYMPHS PCT % 9*   MONOS PCT % 11   EOS PCT % 2     Results from last 7 days   Lab Units 04/24/22  0448 04/23/22  0524 04/22/22  1759   POTASSIUM mmol/L 2 7*   < > 3 7   CHLORIDE mmol/L 98*   < > 95*   CO2 mmol/L 24   < > 22   BUN mg/dL 125*   < > 136*   CREATININE mg/dL 5 77*   < > 6 51*   CALCIUM mg/dL 9 4   < > 9 2   ALK PHOS U/L  --   --  129*   ALT U/L  --   --  25   AST U/L  --   --  16    < > = values in this interval not displayed  Cultures: I have personally reviewed pertinent cultures including:              Imaging: I have personally reviewed pertinent films in PACS  EKG, Pathology, and Other Studies: I have personally reviewed pertinent reports  ** Please Note: Portions of the record may have been created with voice recognition software  Occasional wrong word or "sound a like" substitutions may have occurred due to the inherent limitations of voice recognition software  Read the chart carefully and recognize, using context, where substitutions have occurred   **

## 2022-04-24 NOTE — ASSESSMENT & PLAN NOTE
Wt Readings from Last 3 Encounters:   04/24/22 92 6 kg (204 lb 2 3 oz)   04/18/22 92 2 kg (203 lb 4 2 oz)   04/14/22 93 kg (205 lb)     Patient with history of chronic diastolic failure, will obtain repeat echo during this admission  -holding diuretics until assessed by Nephrology

## 2022-04-25 ENCOUNTER — APPOINTMENT (INPATIENT)
Dept: RADIOLOGY | Facility: HOSPITAL | Age: 67
DRG: 674 | End: 2022-04-25
Attending: INTERNAL MEDICINE
Payer: MEDICARE

## 2022-04-25 ENCOUNTER — HOSPITAL ENCOUNTER (OUTPATIENT)
Dept: INFUSION CENTER | Facility: HOSPITAL | Age: 67
Discharge: HOME | End: 2022-04-25
Attending: INTERNAL MEDICINE

## 2022-04-25 LAB
ALBUMIN SERPL BCP-MCNC: 3.2 G/DL (ref 3.5–5)
ALP SERPL-CCNC: 100 U/L (ref 46–116)
ALT SERPL W P-5'-P-CCNC: 20 U/L (ref 12–78)
ANION GAP SERPL CALCULATED.3IONS-SCNC: 8 MMOL/L (ref 4–13)
AST SERPL W P-5'-P-CCNC: 15 U/L (ref 5–45)
ATRIAL RATE: 58 BPM
BASOPHILS # BLD AUTO: 0.04 THOUSANDS/ΜL (ref 0–0.1)
BASOPHILS NFR BLD AUTO: 0 % (ref 0–1)
BILIRUB SERPL-MCNC: 0.86 MG/DL (ref 0.2–1)
BUN SERPL-MCNC: 122 MG/DL (ref 5–25)
CALCIUM ALBUM COR SERPL-MCNC: 10.4 MG/DL (ref 8.3–10.1)
CALCIUM SERPL-MCNC: 9.8 MG/DL (ref 8.3–10.1)
CHLORIDE SERPL-SCNC: 98 MMOL/L (ref 100–108)
CO2 SERPL-SCNC: 27 MMOL/L (ref 21–32)
CREAT SERPL-MCNC: 5.77 MG/DL (ref 0.6–1.3)
EOSINOPHIL # BLD AUTO: 0.3 THOUSAND/ΜL (ref 0–0.61)
EOSINOPHIL NFR BLD AUTO: 3 % (ref 0–6)
ERYTHROCYTE [DISTWIDTH] IN BLOOD BY AUTOMATED COUNT: 15.7 % (ref 11.6–15.1)
GFR SERPL CREATININE-BSD FRML MDRD: 9 ML/MIN/1.73SQ M
GLUCOSE SERPL-MCNC: 100 MG/DL (ref 65–140)
GLUCOSE SERPL-MCNC: 162 MG/DL (ref 65–140)
GLUCOSE SERPL-MCNC: 180 MG/DL (ref 65–140)
GLUCOSE SERPL-MCNC: 192 MG/DL (ref 65–140)
GLUCOSE SERPL-MCNC: 221 MG/DL (ref 65–140)
HBV CORE AB SER QL: NORMAL
HBV CORE IGM SER QL: NORMAL
HBV SURFACE AB SER-ACNC: <3.1 MIU/ML
HBV SURFACE AG SER QL: NORMAL
HCT VFR BLD AUTO: 31 % (ref 36.5–49.3)
HCV AB SER QL: NORMAL
HGB BLD-MCNC: 10.4 G/DL (ref 12–17)
IMM GRANULOCYTES # BLD AUTO: 0.08 THOUSAND/UL (ref 0–0.2)
IMM GRANULOCYTES NFR BLD AUTO: 1 % (ref 0–2)
LYMPHOCYTES # BLD AUTO: 0.99 THOUSANDS/ΜL (ref 0.6–4.47)
LYMPHOCYTES NFR BLD AUTO: 10 % (ref 14–44)
MAGNESIUM SERPL-MCNC: 2.3 MG/DL (ref 1.6–2.6)
MCH RBC QN AUTO: 30.8 PG (ref 26.8–34.3)
MCHC RBC AUTO-ENTMCNC: 33.5 G/DL (ref 31.4–37.4)
MCV RBC AUTO: 92 FL (ref 82–98)
MONOCYTES # BLD AUTO: 1.04 THOUSAND/ΜL (ref 0.17–1.22)
MONOCYTES NFR BLD AUTO: 10 % (ref 4–12)
NEUTROPHILS # BLD AUTO: 7.99 THOUSANDS/ΜL (ref 1.85–7.62)
NEUTS SEG NFR BLD AUTO: 76 % (ref 43–75)
NRBC BLD AUTO-RTO: 0 /100 WBCS
PHOSPHATE SERPL-MCNC: 5 MG/DL (ref 2.3–4.1)
PLATELET # BLD AUTO: 360 THOUSANDS/UL (ref 149–390)
PMV BLD AUTO: 10.1 FL (ref 8.9–12.7)
POTASSIUM SERPL-SCNC: 3.4 MMOL/L (ref 3.5–5.3)
PROT SERPL-MCNC: 7.4 G/DL (ref 6.4–8.2)
QRS AXIS: -56 DEGREES
QRSD INTERVAL: 214 MS
QT INTERVAL: 530 MS
QTC INTERVAL: 533 MS
RBC # BLD AUTO: 3.38 MILLION/UL (ref 3.88–5.62)
SODIUM SERPL-SCNC: 133 MMOL/L (ref 136–145)
T WAVE AXIS: 103 DEGREES
VENTRICULAR RATE: 61 BPM
WBC # BLD AUTO: 10.44 THOUSAND/UL (ref 4.31–10.16)

## 2022-04-25 PROCEDURE — 93005 ELECTROCARDIOGRAM TRACING: CPT

## 2022-04-25 PROCEDURE — 85025 COMPLETE CBC W/AUTO DIFF WBC: CPT | Performed by: INTERNAL MEDICINE

## 2022-04-25 PROCEDURE — 0JH63XZ INSERTION OF TUNNELED VASCULAR ACCESS DEVICE INTO CHEST SUBCUTANEOUS TISSUE AND FASCIA, PERCUTANEOUS APPROACH: ICD-10-PCS | Performed by: INTERNAL MEDICINE

## 2022-04-25 PROCEDURE — 5A1D70Z PERFORMANCE OF URINARY FILTRATION, INTERMITTENT, LESS THAN 6 HOURS PER DAY: ICD-10-PCS | Performed by: INTERNAL MEDICINE

## 2022-04-25 PROCEDURE — 82948 REAGENT STRIP/BLOOD GLUCOSE: CPT

## 2022-04-25 PROCEDURE — 87340 HEPATITIS B SURFACE AG IA: CPT | Performed by: INTERNAL MEDICINE

## 2022-04-25 PROCEDURE — 83735 ASSAY OF MAGNESIUM: CPT | Performed by: INTERNAL MEDICINE

## 2022-04-25 PROCEDURE — NC001 PR NO CHARGE: Performed by: PHYSICIAN ASSISTANT

## 2022-04-25 PROCEDURE — 86705 HEP B CORE ANTIBODY IGM: CPT | Performed by: INTERNAL MEDICINE

## 2022-04-25 PROCEDURE — 97166 OT EVAL MOD COMPLEX 45 MIN: CPT

## 2022-04-25 PROCEDURE — 86803 HEPATITIS C AB TEST: CPT | Performed by: INTERNAL MEDICINE

## 2022-04-25 PROCEDURE — 80053 COMPREHEN METABOLIC PANEL: CPT | Performed by: INTERNAL MEDICINE

## 2022-04-25 PROCEDURE — 99233 SBSQ HOSP IP/OBS HIGH 50: CPT | Performed by: INTERNAL MEDICINE

## 2022-04-25 PROCEDURE — 93010 ELECTROCARDIOGRAM REPORT: CPT | Performed by: INTERNAL MEDICINE

## 2022-04-25 PROCEDURE — 02H633Z INSERTION OF INFUSION DEVICE INTO RIGHT ATRIUM, PERCUTANEOUS APPROACH: ICD-10-PCS | Performed by: INTERNAL MEDICINE

## 2022-04-25 PROCEDURE — 86704 HEP B CORE ANTIBODY TOTAL: CPT | Performed by: INTERNAL MEDICINE

## 2022-04-25 PROCEDURE — 71046 X-RAY EXAM CHEST 2 VIEWS: CPT

## 2022-04-25 PROCEDURE — 86706 HEP B SURFACE ANTIBODY: CPT | Performed by: INTERNAL MEDICINE

## 2022-04-25 PROCEDURE — 84100 ASSAY OF PHOSPHORUS: CPT | Performed by: INTERNAL MEDICINE

## 2022-04-25 RX ORDER — POTASSIUM CHLORIDE 20 MEQ/1
40 TABLET, EXTENDED RELEASE ORAL
Status: COMPLETED | OUTPATIENT
Start: 2022-04-25 | End: 2022-04-25

## 2022-04-25 RX ORDER — FUROSEMIDE 10 MG/ML
80 INJECTION INTRAMUSCULAR; INTRAVENOUS
Status: DISCONTINUED | OUTPATIENT
Start: 2022-04-25 | End: 2022-04-27

## 2022-04-25 RX ADMIN — METOPROLOL TARTRATE 50 MG: 50 TABLET, FILM COATED ORAL at 21:20

## 2022-04-25 RX ADMIN — POTASSIUM CHLORIDE 40 MEQ: 1500 TABLET, EXTENDED RELEASE ORAL at 12:20

## 2022-04-25 RX ADMIN — SEVELAMER HYDROCHLORIDE 800 MG: 800 TABLET, FILM COATED PARENTERAL at 18:15

## 2022-04-25 RX ADMIN — INSULIN LISPRO 1 UNITS: 100 INJECTION, SOLUTION INTRAVENOUS; SUBCUTANEOUS at 18:16

## 2022-04-25 RX ADMIN — SEVELAMER HYDROCHLORIDE 800 MG: 800 TABLET, FILM COATED PARENTERAL at 09:48

## 2022-04-25 RX ADMIN — TAMSULOSIN HYDROCHLORIDE 0.4 MG: 0.4 CAPSULE ORAL at 18:15

## 2022-04-25 RX ADMIN — APIXABAN 5 MG: 5 TABLET, FILM COATED ORAL at 18:15

## 2022-04-25 RX ADMIN — PRAVASTATIN SODIUM 40 MG: 40 TABLET ORAL at 18:15

## 2022-04-25 RX ADMIN — INSULIN GLARGINE 10 UNITS: 100 INJECTION, SOLUTION SUBCUTANEOUS at 21:20

## 2022-04-25 RX ADMIN — POTASSIUM CHLORIDE 40 MEQ: 1500 TABLET, EXTENDED RELEASE ORAL at 09:49

## 2022-04-25 RX ADMIN — APIXABAN 5 MG: 5 TABLET, FILM COATED ORAL at 09:48

## 2022-04-25 RX ADMIN — INSULIN LISPRO 1 UNITS: 100 INJECTION, SOLUTION INTRAVENOUS; SUBCUTANEOUS at 12:20

## 2022-04-25 RX ADMIN — FUROSEMIDE 80 MG: 10 INJECTION, SOLUTION INTRAMUSCULAR; INTRAVENOUS at 18:16

## 2022-04-25 RX ADMIN — SEVELAMER HYDROCHLORIDE 800 MG: 800 TABLET, FILM COATED PARENTERAL at 12:20

## 2022-04-25 RX ADMIN — METOPROLOL TARTRATE 50 MG: 50 TABLET, FILM COATED ORAL at 09:49

## 2022-04-25 RX ADMIN — SODIUM BICARBONATE 650 MG TABLET 650 MG: at 09:49

## 2022-04-25 NOTE — CASE MANAGEMENT
Case Management Discharge Planning Note    Patient name Shaina Toribio  [facility] [address] [facility] [id number]  : 1955 Date 2022       Current Admission Date: 2022  Current Admission Diagnosis:ELZBIETA (acute kidney injury) [facility])   Patient Active Problem List    Diagnosis Date Noted    Other iron deficiency anemias 04/15/2022    [facility] 20[facility]    Chemotherapy-induced nausea 2022    Chemotherapy-induced neutropenia ([facility] ) 2022    Acute kidney injury superimposed on chronic kidney disease ([facility] ) 2021    RSV (respiratory syncytial virus pneumonia) 2021    Colon cancer ([place] [address] ) 10/23/2021    Absence of toe ([place] [address] ) 2021    Chronic obstructive pulmonary disease ([facility] ) 2020    SSS (sick sinus syndrome) ([facility] ) 2020    Onychomycosis 2020    Chronic painful diabetic neuropathy ([facility] ) 2020    Renovascular hypertension     Multiple drug resistant organism (MDRO) culture positive 2020    Amputation of left great toe ([facility] ) 2020    Osteomyelitis of left foot ([facility] ) 2020    Hyponatremia 2020    Iron deficiency anemia 2020    Anxiety 2020    Staphylococcus aureus bacteremia 2020    Elevated troponin I level 2020    Type 2 diabetes mellitus with diabetic neuropathy, without long-term current use of insulin ([facility] ) 2020    S/P amputation of lesser toe, left ([facility] ) 2019    S/P amputation of lesser toe, right ([facility] ) 2019    Left renal artery stenosis (HCC) 2019    Chronic diastolic (congestive) heart failure (HCC) 2019    Acute pulmonary edema (HCC)     Hyperkalemia 2019    Abnormal CT of the chest 05/15/2019    Mitral valve stenosis     Anemia 2019    Fever 2019    Nonrheumatic aortic valve stenosis 2018    Type 2 diabetes mellitus with chronic kidney disease, with long-term current use of insulin ([place] [address] ) 11/14/2018    Hypertension 11/14/2018    Stage 4 chronic kidney disease ([place] [address] ) 11/14/2018    NICOLASA (obstructive sleep apnea) 11/11/2018    Urinary retention 11/06/2018    Persistent proteinuria 11/05/2018    Volume overload 11/05/2018    PAF (paroxysmal atrial fibrillation) ([place] [address] ) 11/04/2018    Complete heart block (HCC) 38/33/1177    Metabolic acidosis 35/18/9662    ELZBIETA (acute kidney injury) ([place] [address] ) 11/01/2018    Other hyperlipidemia 11/01/2018    Easy bruising 09/27/2017    Peripheral arterial disease ([place] [address] ) 09/06/2017    Vertigo 08/16/2017    Diabetic ulcer of toe of left foot associated with type 2 diabetes mellitus ([place] [address] ) 03/18/2017    Eczema 12/11/2015    PVCs (premature ventricular contractions) 81/26/6300    Systolic murmur 60/47/6916    Bilateral leg edema 07/09/2015    Erectile dysfunction of non-organic origin 04/24/2012    Gout 04/24/2012    Hyperlipidemia 04/24/2012    Uremic acidosis 04/24/2012    Arthritis 04/24/2012    Rhinitis 04/24/2012      LOS (days): 3  Geometric Mean LOS (GMLOS) (days): 3 10  Days to GMLOS:0 5     OBJECTIVE:  Risk of Unplanned Readmission Score: 33         Current admission status: Inpatient   Preferred Pharmacy:   [id number] [address]  Phone: [phone] Fax: [phone]    Primary Care Provider: Filomena Henderson DO    Primary Insurance: MEDICARE  Secondary Insurance: [facility]    DISCHARGE DETAILS:    Discharge planning discussed with[de-identified] patient  Freedom of Choice: Yes        Were Treatment Team discharge recommendations reviewed with patient/caregiver?: Yes  Did patient/caregiver verbalize understanding of patient care needs?: Yes  Were patient/caregiver advised of the risks associated with not following Treatment Team discharge recommendations?: Yes                   Other Referral/Resources/Interventions Provided:  Referral Comments: patient has no therapy needs but will need new start dialysis at time of dischage  Plan is for discharge once this is set up  At this time patient has not yet had any dialysis sessions and permacath is to be placed later today vs tomorrow with plan for first dialysis session tomorrow  Nephrology is aware that hepatitis panel, chest xray, and EKG  Once that information is avaialbe as well as the dialysis order and flowsheets, CM will be able to send over information and get outpatient dialysis set up  Spoke with the patient, he prefers early am schedule (although states he cannot make a 5am schedule) and that family would be transferring to/from session  States that schedule days does not matter at this time

## 2022-04-25 NOTE — PROGRESS NOTES
NEPHROLOGY PROGRESS NOTE   Edgardo Arce 74 y o  male MRN: [id number]  Unit/Bed#: [facility] 905-01 Encounter: [id number]        ASSESSMENT & PLAN    1  ELZBIETA on stage 4-5 chronic kidney disease  -with worsening azotemia, chemotherapy now on hold in GFR  -good urine output on Lasix drip  -with multiple hospitalizations with volume overload estimated GFR less than 15 needs continued chemotherapy and now with severe azotemia  -given the above would recommend dialysis initiation:  Indication volume overload//management  -appreciate interventional radiology evaluation tunneled dialysis catheter placement for tomorrow and will plan on initiation of hemodialysis subsequently thereafter    2  Hypokalemia  -continue potassium repletion    3  Volume overload with pulmonary edema and increasing lower extremity edema  -with pulmonary edema and lower extremity edema  -will check chest x-ray  -Weight is significantly decreased blood pressures are stable  -creatinine remains elevated  -will discontinue Lasix drip and place on furosemide 80 mg IV b i d   -volume management subsequently with dialysis    4  Sigmoid colon malignancy  -remains on Leucovorin and 5 FU  -will notify hematologist regarding initiation of dialysis    5  Metabolic acidosis  -no anion gap, bicarb 27 will hold sodium bicarbonate tablet for now    6  Hypertension  -continue metoprolol 50 mg twice daily    DISCUSSION/SUMMARY    Tunneled dialysis catheter tomorrow  For hemodialysis initiation  Placement in [facility]   He was in agreement had no further question    SUBJECTIVE:    Patient was seen today  Denies any acute chest pain or shortness of breath  States he feels okay tolerating some p o  Intake    12 point review of systems was otherwise negative besides what is mentioned above      Medications:    Current Facility-Administered Medications:     apixaban (ELIQUIS) tablet 5 mg, 5 mg, Oral, BID, Bro Renee, , 5 mg at 04/25/22 0948    furosemide (LASIX) 500 mg infusion 50 mL, 20 mg/hr, Intravenous, Continuous, Rhunettnan Jamison DO, Last Rate: 2 mL/hr at 04/25/22 0700, 20 mg/hr at 04/25/22 0700    insulin glargine (LANTUS) subcutaneous injection 10 Units 0 1 mL, 10 Units, Subcutaneous, HS, Bro Duran DO, 10 Units at 04/24/22 2117    insulin lispro (HumaLOG) 100 units/mL subcutaneous injection 1-5 Units, 1-5 Units, Subcutaneous, TID AC, 1 Units at 04/24/22 1722 **AND** Fingerstick Glucose (POCT), , , TID AC, Bro Duran, DO    metoprolol tartrate (LOPRESSOR) tablet 50 mg, 50 mg, Oral, Q12H [address], Bro Duran DO, 50 mg at 04/25/22 0949    ondansetron (ZOFRAN) injection 4 mg, 4 mg, Intravenous, Q6H PRN, Bro Duran DO    potassium chloride (K-DUR,KLOR-CON) CR tablet 40 mEq, 40 mEq, Oral, Q2H, Tash Martínez MD, 40 mEq at 04/25/22 9704    pravastatin (PRAVACHOL) tablet 40 mg, 40 mg, Oral, Daily With Dinner, Bro Duran DO, 40 mg at 04/24/22 1721    sevelamer (RENAGEL) tablet 800 mg, 800 mg, Oral, TID With Meals, Sera Jamison DO, 800 mg at 04/25/22 0948    sodium bicarbonate tablet 650 mg, 650 mg, Oral, BID after meals, Bro Duran DO, 650 mg at 04/25/22 0949    tamsulosin (FLOMAX) capsule 0 4 mg, 0 4 mg, Oral, Daily With Dinner, Kenrick Rudolph DO, 0 4 mg at 04/24/22 1721    OBJECTIVE:    Vitals:    04/24/22 1436 04/24/22 2058 04/24/22 2111 04/25/22 0708   BP: 135/54 151/76 146/72 143/68   Pulse: 60  60 63   Resp:   18 16   Temp:  98 5 °F (36 9 °C) 98 3 °F (36 8 °C) 98 °F (36 7 °C)   TempSrc:       SpO2: 96%  95% 94%   Weight:       Height:            Temp:  [98 °F (36 7 °C)-98 5 °F (36 9 °C)] 98 °F (36 7 °C)  HR:  [60-63] 63  Resp:  [16-18] 16  BP: (135-151)/(54-76) 143/68  SpO2:  [94 %-96 %] 94 %     Body mass index is 29 29 kg/m²  Weight (last 2 days)     Date/Time Weight    04/24/22 0550 92 6 (204 15)    04/23/22 1335 96 6 (213)          I/O last 3 completed shifts:   In: 129 6 [I V :79 6; IV Piggyback:50]  Out: 2000 [Urine:2000]    No intake/output data recorded  Physical exam:    General: no acute distress, cooperative  Eyes: conjunctivae pink, anicteric sclerae  ENT: lips and mucous membranes moist, no exudates, normal external ears  Neck: ROM intact, no JVD  Chest: No respiratory distress, no accessory muscle use  CVS: normal rate, non pericardial friction rub  Abdomen: soft, non-tender, non-distended, normoactive bowel sounds  Extremities:  2+ edema  Skin: no rash  Neuro: awake, alert, oriented, grossly intact  Psych:  Pleasant affect    Invasive Devices:      Lab Results:   Results from last 7 days   Lab Units 04/25/22  0450 04/24/22  2128 04/24/22  0448 04/23/22  0527 04/23/22  0524 04/22/22  1759 04/22/22  0858 04/22/22  0858   WBC Thousand/uL 10 44*  --  10 32*  --  13 90* 13 65*  --  11 93*   HEMOGLOBIN g/dL 10 4*  --  9 2*  --  9 9* 10 1*  --  9 8*   HEMATOCRIT % 31 0*  --  26 9*  --  29 0* 29 3*  --  29 4*   PLATELETS Thousands/uL 360  --  331  --  366 320  --  350   POTASSIUM mmol/L 3 4* 3 9 2 7*  --  3 5 3 7   < > 3 6   CHLORIDE mmol/L 98*  --  98*  --  97* 95*  --  95*   CO2 mmol/L 27  --  24  --  23 22  --  22   BUN mg/dL 122*  --  125*  --  128* 136*  --  136*   CREATININE mg/dL 5 77*  --  5 77*  --  6 28* 6 51*  --  6 76*   CALCIUM mg/dL 9 8  --  9 4  --  9 2 9 2  --  9 0   MAGNESIUM mg/dL 2 3  --   --   --  2 4  --   --  2 3   PHOSPHORUS mg/dL 5 0*  --   --   --  5 9*  --   --   --    ALK PHOS U/L 100  --   --   --   --  129*  --  109   ALT U/L 20  --   --   --   --  25  --  22   AST U/L 15  --   --   --   --  16  --  15   LEUKOCYTES UA   --   --   --  Negative  --   --   --   --    BLOOD UA   --   --   --  Negative  --   --   --   --     < > = values in this interval not displayed  Portions of the record may have been created with voice recognition software  Occasional wrong word or "sound a like" substitutions may have occurred due to the inherent limitations of voice recognition software   Read the chart carefully and recognize, using context, where substitutions have occurred  If you have any questions, please contact the dictating provider

## 2022-04-25 NOTE — ASSESSMENT & PLAN NOTE
Patient with history of CKD for awaiting AV fistula placement for future dialysis  -unfortunately over the past several weeks has had worsening creatinine now elevated up to 6  -patient has been taking metolazone twice a week , along with daily 40 mg torsemide in the morning and 20 mg torsemide in the p m, for lower extremity edema that has unfortunately been nonresponsive to treatment  -hold all nephrotoxins  -urinalysis - bland, protein creatinine ratio -2 33  -nephrology consult    4/23-appreciate urology recommendations, have initiated Lasix infusion and will monitor for improvement  Consider PermCath placement in 24-48 hours if does not improve with Lasix and supportive care  Nephrology note, patient has consented to dialysis if needed  4/25-unfortunately, no improvement creatinine; patient will require dialysis  NPO at midnight and PermCath to be placed tomorrow    Will start dialysis following that and will need chair time prior to be discharged the outpatient setting

## 2022-04-25 NOTE — PLAN OF CARE
Problem: PAIN - ADULT  Goal: Verbalizes/displays adequate comfort level or baseline comfort level  Description: Interventions:  - Encourage patient to monitor pain and request assistance  - Assess pain using appropriate pain scale  - Administer analgesics based on type and severity of pain and evaluate response  - Implement non-pharmacological measures as appropriate and evaluate response  - Consider cultural and social influences on pain and pain management  - Notify physician/advanced practitioner if interventions unsuccessful or patient reports new pain  Outcome: Progressing     Problem: INFECTION - ADULT  Goal: Absence or prevention of progression during hospitalization  Description: INTERVENTIONS:  - Assess and monitor for signs and symptoms of infection  - Monitor lab/diagnostic results  - Monitor all insertion sites, i e  indwelling lines, tubes, and drains  - Monitor endotracheal if appropriate and nasal secretions for changes in amount and color  - [place] appropriate cooling/warming therapies per order  - Administer medications as ordered  - Instruct and encourage patient and family to use good hand hygiene technique  - Identify and instruct in appropriate isolation precautions for identified infection/condition  Outcome: Progressing     Problem: SAFETY ADULT  Goal: Patient will remain free of falls  Description: INTERVENTIONS:  - Educate patient/family on patient safety including physical limitations  - Instruct patient to call for assistance with activity   - Consult OT/PT to assist with strengthening/mobility   - Keep Call bell within reach  - Keep bed low and locked with side rails adjusted as appropriate  - Keep care items and personal belongings within reach  - Initiate and maintain comfort rounds  - Make Fall Risk Sign visible to staff  -Outcome: Progressing     Problem: Potential for Falls  Goal: Patient will remain free of falls  Description: INTERVENTIONS:  - Educate patient/family on patient safety including physical limitations  - Instruct patient to call for assistance with activity   - Consult OT/PT to assist with strengthening/mobility   - Keep Call bell within reach  - Keep bed low and locked with side rails adjusted as appropriate  - Keep care items and personal belongings within reach  - Initiate and maintain comfort rounds  - Make Fall Risk Sign visible to staff  - Consider moving patient to room near nurses station  Outcome: Progressing

## 2022-04-25 NOTE — ASSESSMENT & PLAN NOTE
Lab Results   Component Value Date    EGFR 9 04/25/2022    EGFR 9 04/24/2022    EGFR 8 04/23/2022    CREATININE 5 77 (H) 04/25/2022    CREATININE 5 77 (H) 04/24/2022    CREATININE 6 28 (H) 04/23/2022   Patient with progression of stage IV CKD/acute kidney injury  -management as under acute kidney injury  -Lasix infusion started by Nephrology  -patient continues to make urine although has been decreasing in amount over the past 2 days

## 2022-04-25 NOTE — ASSESSMENT & PLAN NOTE
Lab Results   Component Value Date    HGBA1C 6 0 (H) 02/18/2022       Recent Labs     04/24/22  1124 04/24/22  1615 04/24/22  2102 04/25/22  0837   POCGLU 188* 206* 186* 162*       Blood Sugar Average: Last 72 hrs:  (P) 172 6 substitute Lantus 20 units at bedtime for home insulin  Blood sugar checks his meal sliding scale

## 2022-04-25 NOTE — OCCUPATIONAL THERAPY NOTE
Occupational Therapy Evaluation     Patient Name: Raul HEART Date: 4/25/2022  Problem List  Principal Problem:    ELZBIETA (acute kidney injury) ([facility] )  Active Problems:    Gout    Hyperlipidemia    PAF (paroxysmal atrial fibrillation) ([facility])    NICOLASA (obstructive sleep apnea)    Type 2 diabetes mellitus with chronic kidney disease, with long-term current use of insulin ([facility])    Stage 4 chronic kidney disease ([facility])    Chronic diastolic (congestive) heart failure ([facility])    Hyponatremia    Chronic obstructive pulmonary disease ([facility] )    Past Medical History  Past Medical History:   Diagnosis Date    Anemia     iron def    Arthritis     OA    Bruise of both arms     forearms and both hands    Bruises easily     Cancer ([facility] ) 09/01/2021    colon    CHF (congestive heart failure) ([facility])     Chronic kidney disease     CPAP (continuous positive airway pressure) dependence     Diabetes mellitus ([place] [address] )     Diabetic foot ulcer ([place] [address] )     Eczema     Erectile dysfunction     Gout     Heart murmur     History of permanent cardiac pacemaker placement 11/2018    Hyperlipidemia     Hypertension     Hypoglycemia 3/8/2019    Murmur     Nephropathy     Osteoarthritis     Pacemaker 11/06/2018    PAD (peripheral artery disease) ([facility])     Seasonal allergies     Sleep apnea     Toe infection     bilat great toes    Vertigo     Walks frequently     Wears dentures     upper    Wears glasses      Past Surgical History  Past Surgical History:   Procedure Laterality Date    CARDIAC PACEMAKER PLACEMENT      CARPAL TUNNEL RELEASE Left     CARPAL TUNNEL RELEASE Right     CARPAL TUNNEL RELEASE      COLONOSCOPY  08/2020    COLONOSCOPY      FL GUIDED CENTRAL VENOUS ACCESS DEVICE INSERTION  1/11/2022    FOOT AMPUTATION Left 2/10/2020    Procedure: PARTIAL 1ST RAY AMPUTATION;  Surgeon: Mary Lou Cardoso DPM;  [facility];  Service: Podiatry    INCISION AND DRAINAGE OF WOUND Left 1/12/2020    Procedure: INCISION AND DRAINAGE (I&D) EXTREMITY AND REMOVAL OF SESMOID BONE;  Surgeon: Franklin Paulson DPM;  [facility];  Service: Podiatry    IR OTHER  1/21/2022    IR PICC REPOSITION  1/14/2020    KNEE ARTHROSCOPY Left     KNEE ARTHROSCOPY Right     KNEE SURGERY      NJ AMPUTATION TOE,I-P JT Bilateral 5/2/2017    Procedure: PARTIAL AMPUTATION RIGHT AND LEFT HALLUX ;  Surgeon: Nga Ma DPM;  [facility];  Service: Podiatry    NJ AMPUTATION TOE,MT-P JT Left 7/25/2017    Procedure: 2ND TOE AMPUTATION;  Surgeon: Nga Ma DPM;  [facility];  Service: Podiatry    NJ INSJ TUNNELED CTR VAD W/SUBQ PORT AGE 5 YR/> N/A 1/11/2022    Procedure: INSERTION VENOUS PORT ( PORT-A-CATH) IR;  Surgeon: Anup Felder DO;  [facility];  Service: Interventional Radiology    RESECTION LOW ANTERIOR LAPAROSCOPIC N/A 10/21/2021    Procedure: RESECTION LOW ANTERIOR LAPAROSCOPIC;  Surgeon: Terry Islas MD;  [facility];  Service: Colorectal    SHOULDER ARTHROSCOPY Left     with screws,RTC    SHOULDER SURGERY      TOE AMPUTATION Left 1/8/2020    Procedure: HALLUX AMPUTATION;  Surgeon: Franklin Paulson DPM;  [facility];  Service: Podiatry    UPPER GASTROINTESTINAL ENDOSCOPY  03/2020    Intestinal metaplasia prepyloric    VASECTOMY             04/25/22 0957   OT Last Visit   OT Visit Date 04/25/22   Note Type   Note type Evaluation   Restrictions/Precautions   Weight Bearing Precautions Per Order Yes   RLE Weight Bearing Per Order WBAT   LLE Weight Bearing Per Order FWB   Braces or Orthoses   (LLE Darco shoe - confirmed via podiatry)   Other Precautions Multiple lines; Fall Risk   Pain Assessment   Pain Assessment Tool 0-10   Pain Score No Pain   Home Living   Type of Home House   Home Layout Two level  (1STE)   Bathroom Shower/Tub Tub/shower unit   Bathroom Toilet Raised   Bathroom Equipment Grab bars in shower; Tub transfer bench;Grab bars around toilet   Home Equipment Walker;Cane  (did not use PTA)   Prior Function   Level of [place] Independent with ADLs and functional mobility   Lives With Spouse;Daughter  (WILDER and 3 grandkids)   Receives Help From Family   ADL Assistance Independent   IADLs Independent   Falls in the last 6 months 0   Vocational Retired   Lifestyle   Autonomy PTA, pt reports being I with ADLs, IADLs, fnxl mobility, (+)    Reciprocal Relationships Family   Service to Others Retired   Intrinsic Gratification Spending time with his dogs   Psychosocial   Psychosocial (WDL) WDL   ADL   Where Assessed Chair   Eating Assistance 7  Independent   Grooming Assistance 7  Independent   UB Bathing Assistance 7  Independent   LB Bathing Assistance 5  [facility] [address]  7  Independent    [facility] 5  Supervision/Setup   LB Dressing Deficit Don/doff R shoe;Don/doff L shoe   Toileting Assistance  5  Supervision/Setup   Bed Mobility   Supine to Sit Unable to assess   Sit to Supine Unable to assess   Additional Comments Pt seated OOB in chair upon arrival    Transfers   Sit to Stand 7  Independent   Stand to Sit 7  Independent   Functional Mobility   Functional Mobility 5  Supervision   Balance   Static Sitting Good   Dynamic Sitting Good   Static Standing [address]   Activity Tolerance   Activity Tolerance Patient tolerated treatment well   Nurse Made Aware [address] [place] Assessment   RUE Assessment WFL   LUE Assessment   LUE Assessment WFL   Hand Function   Gross Motor Coordination Functional   Fine Motor Coordination Functional   Vision-Basic Assessment   Current Vision Wears glasses all the time   Vision - Complex Assessment   Ocular Range of Motion WFL   Head Position WDL   Tracking Able to track stimulus in all quads without difficulty   Cognition   Overall Cognitive Status [facility]   Arousal/Participation Alert; Responsive; Cooperative   Attention Within functional limits   Orientation Level Oriented X4   Memory Within functional limits   Following Commands Follows all commands and directions without difficulty   Comments Pt very pleasant and coopertive t/o session    Assessment   Assessment Pt is a 74 y o  male admitted to [facility] on 4/22/2022 w/ ELZBIETA  has a past medical history of Anemia, Arthritis, Bruises easily, Cancer, CHF, CKD, Diabetes mellitus, Diabetic foot ulcer, Eczema, Erectile dysfunction, Gout, Heart murmur, Hyperlipidemia, Hypertension, Hypoglycemia, Murmur, Nephropathy, Osteoarthritis, Pacemaker, PAD, Seasonal allergies, Sleep apnea, Toe infection, Vertigo  Pt with active OT orders and up and OOB as tolerated orders  As per pt report, pta, resides with his family in a 2STH, 1STE  Pt was I w/  ADLS and IADLS, (+) drove  Upon evaluation, pt currently independent for transfers and S for mobility  Pt currently requires I eating, I grooming, I UB ADLs, S LB ADLs, and S toileting  Pt with supportive family who can assist as needed upon d/c  Pt with no questions or concerns at this time  From OT standpoint, recommendation would be home with social support  No further acute OT needs  D/C OT  Please re-consult if needed  Thank you  Pt was left after session with all current needs met  The patient's raw score on the AM-PAC Daily Activity inpatient short form is 21, standardized score is 44 27, greater than 39 4  Patients at this level are likely to benefit from discharge to home  Please refer to the recommendation of the Occupational Therapist for safe discharge planning     Plan   OT Frequency Eval only   Recommendation   OT Discharge Recommendation No rehabilitation needs   OT - OK to Discharge Yes  (when medically stable)   AM-PAC Daily Activity Inpatient   Lower Body Dressing 3   Bathing 3   Toileting 3   Upper Body Dressing 4   Grooming 4   Eating 4   Daily Activity Raw Score 21   Daily Activity Standardized Score (Calc for Raw Score >=11) 44 27   AM-PAC Applied Cognition Inpatient   Following a Speech/Presentation 4   Understanding Ordinary Conversation 4   Taking Medications 4   Remembering Where Things Are Placed or Put Away 4   Remembering List of 4-5 Errands 4   Taking Care of Complicated Tasks 4   Applied Cognition Raw Score 24   Applied Cognition Standardized Score 62 21       Blair Kehr, MS, OTR/L

## 2022-04-25 NOTE — TELEMEDICINE
e-Consult (IPC)  - Interventional Radiology  Shaina Toribio 74 y o  male MRN: [id number]  [facility] Encounter: [id number]          Interventional Radiology has been consulted to evaluate Shaina Toribio    We were consulted by nephrology concerning this patient with CKD V     IP Consult to IR  Consult performed by: J Luis Robledo PA-C  Consult ordered by: Jasmin Contreras DO        04/25/22    Assessment/Recommendation:     74year old male with pmh of afib on Eliquis, DM2, CKD V, presenting with ELZBIETA    - will plan for permcath placement Tuesday 4/26  - please keep npo after midnight    Total time spent in review of data, discussion with requesting provider and rendering advice was 15 minutes     Thank you for allowing Interventional Radiology to participate in the care of Shaina Toribio  Please don't hesitate to call or TigerText us with any questions       J Luis Robledo PA-C

## 2022-04-25 NOTE — PROGRESS NOTES
[facility]  Progress Note - Pedro Gilbert 1955, 74 y o  male MRN: [id number]  [facility]: [id number]  Primary Care Provider: Ольга Muhammad DO   Date and time admitted to hospital: 4/22/2022  5:30 PM    Chronic obstructive pulmonary disease [facility])  Assessment & Plan  Patient with noted history of COPD, comfortable on room air saturating over 96%    Hyponatremia  Assessment & Plan  Secondary to diuretics and worsening kidney function  Fluid restriction; improving      Chronic diastolic (congestive) heart failure (HCC)  Assessment & Plan  Wt Readings from Last 3 Encounters:   04/24/22 92 6 kg (204 lb 2 3 oz)   04/18/22 92 2 kg (203 lb 4 2 oz)   04/14/22 93 kg (205 lb)     Patient with history of chronic diastolic failure, will obtain repeat echo during this admission  -holding diuretics until assessed by Nephrology        Stage 4 chronic kidney disease [facility])  Assessment & Plan  Lab Results   Component Value Date    EGFR 9 04/25/2022    EGFR 9 04/24/2022    EGFR 8 04/23/2022    CREATININE 5 77 (H) 04/25/2022    CREATININE 5 77 (H) 04/24/2022    CREATININE 6 28 (H) 04/23/2022   Patient with progression of stage IV CKD/acute kidney injury  -management as under acute kidney injury  -Lasix infusion started by Nephrology  -patient continues to make urine although has been decreasing in amount over the past 2 days    Type 2 diabetes mellitus with chronic kidney disease, with long-term current use of insulin [facility])  Assessment & Plan  Lab Results   Component Value Date    HGBA1C 6 0 (H) 02/18/2022       Recent Labs     04/24/22  1124 04/24/22  1615 04/24/22  2102 04/25/22  0837   POCGLU 188* 206* 186* 162*       Blood Sugar Average: Last 72 hrs:  (P) 172 6 substitute Lantus 20 units at bedtime for home insulin  Blood sugar checks his meal sliding scale    NICOLASA (obstructive sleep apnea)  Assessment & Plan  Patient with history of NICOLASA compliant with CPAP    PAF (paroxysmal atrial fibrillation) ([facility] )  Assessment & Plan  Patient with history of paroxysmal Afib  -rate control with Lopressor 50 mg b i d   -anticoagulation with Eliquis 5 mg b i d-does not meet criteria for dose reduction    Hyperlipidemia  Assessment & Plan  Continue statin for hyperlipidemia, patient denies any myalgias, LFTs within normal limits    Gout  Assessment & Plan  Noted history of gout; will hold allopurinol for now    * ELZBIETA (acute kidney injury) [facility])  Assessment & Plan  Patient with history of CKD for awaiting AV fistula placement for future dialysis  -unfortunately over the past several weeks has had worsening creatinine now elevated up to 6  -patient has been taking metolazone twice a week , along with daily 40 mg torsemide in the morning and 20 mg torsemide in the p m, for lower extremity edema that has unfortunately been nonresponsive to treatment  -hold all nephrotoxins  -urinalysis - bland, protein creatinine ratio -2 33  -nephrology consult    4/23-appreciate urology recommendations, have initiated Lasix infusion and will monitor for improvement  Consider PermCath placement in 24-48 hours if does not improve with Lasix and supportive care  Nephrology note, patient has consented to dialysis if needed  4/25-unfortunately, no improvement creatinine; patient will require dialysis  NPO at midnight and PermCath to be placed tomorrow  Will start dialysis following that and will need chair time prior to be discharged the outpatient setting      VTE Pharmacologic Prophylaxis:   Pharmacologic: Apixaban (Eliquis)  Mechanical VTE Prophylaxis in Place: Yes    Patient Centered Rounds: I have performed bedside rounds with nursing staff today  Discussions with Specialists or Other Care Team Provider:  Nephrology    Education and Discussions with Family / Patient: Discussed treatment plan with family and patient who agree with current plan; encouraged to ask questions and participate        Time Spent for Care: 45 minutes  More than 50% of total time spent on counseling and coordination of care as described above  Current Length of Stay: 3 day(s)    Current Patient Status: Inpatient   Certification Statement: The patient will continue to require additional inpatient hospital stay due to Treatment of acute renal failure    Discharge Plan: To be determined    Code Status: Level 1 - Full Code      Subjective:   Patient seen examined bedside, no acute distress or discomfort noted  Unfortunately, renal function has not improved with Lasix infusion  Nephrology planning for PermCath placement tomorrow followed by initiation of dialysis  Case management following along and will request outpatient dialysis time  Replete potassium and PT/OT recommending no rehabilitation needs when stable for discharge  Labs and vitals otherwise stable  Objective:     Vitals:   Temp (24hrs), Av 3 °F (36 8 °C), Min:98 °F (36 7 °C), Max:98 5 °F (36 9 °C)    Temp:  [98 °F (36 7 °C)-98 5 °F (36 9 °C)] 98 °F (36 7 °C)  HR:  [60-63] 63  Resp:  [16-18] 16  BP: (143-151)/(68-76) 143/68  SpO2:  [94 %-95 %] 94 %  Body mass index is 29 29 kg/m²  Input and Output Summary (last 24 hours): Intake/Output Summary (Last 24 hours) at 2022 1508  Last data filed at 2022 0601  Gross per 24 hour   Intake 79 57 ml   Output 1000 ml   Net -920 43 ml       Physical Exam:     Physical Exam  Vitals and nursing note reviewed  Constitutional:       Appearance: He is well-developed  HENT:      Head: Normocephalic and atraumatic  Eyes:      Conjunctiva/sclera: Conjunctivae normal    Cardiovascular:      Rate and Rhythm: Normal rate and regular rhythm  Heart sounds: Murmur heard  Pulmonary:      Effort: Pulmonary effort is normal  No respiratory distress  Abdominal:      Palpations: Abdomen is soft  Tenderness: There is no abdominal tenderness  Musculoskeletal:      Cervical back: Neck supple        Right lower leg: Edema present  Left lower leg: Edema present  Skin:     General: Skin is warm and dry  Neurological:      Mental Status: He is alert and oriented to person, place, and time  Psychiatric:         Behavior: Behavior normal            Additional Data:     Labs:    Results from last 7 days   Lab Units 04/25/22  0450   WBC Thousand/uL 10 44*   HEMOGLOBIN g/dL 10 4*   HEMATOCRIT % 31 0*   PLATELETS Thousands/uL 360   NEUTROS PCT % 76*   LYMPHS PCT % 10*   MONOS PCT % 10   EOS PCT % 3     Results from last 7 days   Lab Units 04/25/22  0450   SODIUM mmol/L 133*   POTASSIUM mmol/L 3 4*   CHLORIDE mmol/L 98*   CO2 mmol/L 27   BUN mg/dL 122*   CREATININE mg/dL 5 77*   ANION GAP mmol/L 8   CALCIUM mg/dL 9 8   ALBUMIN g/dL 3 2*   TOTAL BILIRUBIN mg/dL 0 86   ALK PHOS U/L 100   ALT U/L 20   AST U/L 15   GLUCOSE RANDOM mg/dL 100         Results from last 7 days   Lab Units 04/25/22  1105 04/25/22  0837 04/24/22  2102 04/24/22  1615 04/24/22  1124 04/24/22  0806 04/23/22  2047 04/23/22  1654 04/23/22  1121 04/23/22  0745 04/22/22  2316   POC GLUCOSE mg/dl 180* 162* 186* 206* 188* 109 214* 225* 166* 98 172*                   * I Have Reviewed All Lab Data Listed Above  * Additional Pertinent Lab Tests Reviewed:  All Labs Within Last 24 Hours Reviewed    Imaging:    Imaging Reports Reviewed Today Include:   Imaging Personally Reviewed by Myself Includes:      Recent Cultures (last 7 days):           Last 24 Hours Medication List:   Current Facility-Administered Medications   Medication Dose Route Frequency Provider Last Rate    apixaban  5 mg Oral BID Bro Duran, DO      furosemide  80 mg Intravenous BID (diuretic) Migdalia Schroeder, DO      insulin glargine  10 Units Subcutaneous HS Bro Duran DO      insulin lispro  1-5 Units Subcutaneous TID AC Bro Duran DO      metoprolol tartrate  50 mg Oral Q12H [facility] Bro Duarn, DO      ondansetron  4 mg Intravenous Q6H PRN Bro Duran, DO      pravastatin  40 mg Oral Daily With [employer], DO      sevelamer  800 mg Oral TID With Meals Newton Jamison DO      tamsulosin  0 4 mg Oral Daily With [employer], DO          Today, Patient Was Seen By: Jean Rojas MD    ** Please Note: Dictation voice to text software may have been used in the creation of this document   **

## 2022-04-25 NOTE — RESTORATIVE TECHNICIAN NOTE
Restorative Technician Note      Patient Name: Shira Sanders     Restorative Tech Visit Date: 04/25/22  Note Type: Bracing, Initial consult  Brace Applied: Darco Wedge Shoe (Toe Unloading) (size medium)  Additional Brace Ordered: No  Bracing Recommendations: None  Education Provided: Yes  Nurse Communication: Nurse aware of consult, application of brace      Please call Mobility Coordinator at ext  [phone] in regards to bracing instruction and/or adjustment    Anirudh AVILEZ

## 2022-04-26 ENCOUNTER — APPOINTMENT (INPATIENT)
Dept: DIALYSIS | Facility: HOSPITAL | Age: 67
DRG: 674 | End: 2022-04-26
Payer: MEDICARE

## 2022-04-26 ENCOUNTER — APPOINTMENT (INPATIENT)
Dept: RADIOLOGY | Facility: HOSPITAL | Age: 67
DRG: 674 | End: 2022-04-26
Payer: MEDICARE

## 2022-04-26 LAB
ANION GAP SERPL CALCULATED.3IONS-SCNC: 7 MMOL/L (ref 4–13)
BASOPHILS # BLD AUTO: 0.03 THOUSANDS/ΜL (ref 0–0.1)
BASOPHILS NFR BLD AUTO: 0 % (ref 0–1)
BUN SERPL-MCNC: 113 MG/DL (ref 5–25)
CALCIUM SERPL-MCNC: 9.8 MG/DL (ref 8.3–10.1)
CHLORIDE SERPL-SCNC: 100 MMOL/L (ref 100–108)
CO2 SERPL-SCNC: 30 MMOL/L (ref 21–32)
CREAT SERPL-MCNC: 5.54 MG/DL (ref 0.6–1.3)
EOSINOPHIL # BLD AUTO: 0.31 THOUSAND/ΜL (ref 0–0.61)
EOSINOPHIL NFR BLD AUTO: 3 % (ref 0–6)
ERYTHROCYTE [DISTWIDTH] IN BLOOD BY AUTOMATED COUNT: 15.9 % (ref 11.6–15.1)
GFR SERPL CREATININE-BSD FRML MDRD: 9 ML/MIN/1.73SQ M
GLUCOSE SERPL-MCNC: 107 MG/DL (ref 65–140)
GLUCOSE SERPL-MCNC: 112 MG/DL (ref 65–140)
GLUCOSE SERPL-MCNC: 213 MG/DL (ref 65–140)
GLUCOSE SERPL-MCNC: 239 MG/DL (ref 65–140)
GLUCOSE SERPL-MCNC: 284 MG/DL (ref 65–140)
GLUCOSE SERPL-MCNC: 305 MG/DL (ref 65–140)
HCT VFR BLD AUTO: 29 % (ref 36.5–49.3)
HGB BLD-MCNC: 9.4 G/DL (ref 12–17)
IMM GRANULOCYTES # BLD AUTO: 0.09 THOUSAND/UL (ref 0–0.2)
IMM GRANULOCYTES NFR BLD AUTO: 1 % (ref 0–2)
INR PPP: 1.88 (ref 0.84–1.19)
LYMPHOCYTES # BLD AUTO: 1.04 THOUSANDS/ΜL (ref 0.6–4.47)
LYMPHOCYTES NFR BLD AUTO: 10 % (ref 14–44)
MCH RBC QN AUTO: 30.8 PG (ref 26.8–34.3)
MCHC RBC AUTO-ENTMCNC: 32.4 G/DL (ref 31.4–37.4)
MCV RBC AUTO: 95 FL (ref 82–98)
MONOCYTES # BLD AUTO: 1.1 THOUSAND/ΜL (ref 0.17–1.22)
MONOCYTES NFR BLD AUTO: 10 % (ref 4–12)
NEUTROPHILS # BLD AUTO: 8.1 THOUSANDS/ΜL (ref 1.85–7.62)
NEUTS SEG NFR BLD AUTO: 76 % (ref 43–75)
NRBC BLD AUTO-RTO: 0 /100 WBCS
PLATELET # BLD AUTO: 323 THOUSANDS/UL (ref 149–390)
PMV BLD AUTO: 9.8 FL (ref 8.9–12.7)
POTASSIUM SERPL-SCNC: 3.8 MMOL/L (ref 3.5–5.3)
PROTHROMBIN TIME: 20.7 SECONDS (ref 11.6–14.5)
RBC # BLD AUTO: 3.05 MILLION/UL (ref 3.88–5.62)
SODIUM SERPL-SCNC: 137 MMOL/L (ref 136–145)
WBC # BLD AUTO: 10.67 THOUSAND/UL (ref 4.31–10.16)

## 2022-04-26 PROCEDURE — 90935 HEMODIALYSIS ONE EVALUATION: CPT | Performed by: INTERNAL MEDICINE

## 2022-04-26 PROCEDURE — 80048 BASIC METABOLIC PNL TOTAL CA: CPT | Performed by: INTERNAL MEDICINE

## 2022-04-26 PROCEDURE — 99152 MOD SED SAME PHYS/QHP 5/>YRS: CPT | Performed by: RADIOLOGY

## 2022-04-26 PROCEDURE — 99233 SBSQ HOSP IP/OBS HIGH 50: CPT | Performed by: INTERNAL MEDICINE

## 2022-04-26 PROCEDURE — 76937 US GUIDE VASCULAR ACCESS: CPT | Performed by: RADIOLOGY

## 2022-04-26 PROCEDURE — 36558 INSERT TUNNELED CV CATH: CPT | Performed by: RADIOLOGY

## 2022-04-26 PROCEDURE — 36558 INSERT TUNNELED CV CATH: CPT

## 2022-04-26 PROCEDURE — 82948 REAGENT STRIP/BLOOD GLUCOSE: CPT

## 2022-04-26 PROCEDURE — C1894 INTRO/SHEATH, NON-LASER: HCPCS

## 2022-04-26 PROCEDURE — 85610 PROTHROMBIN TIME: CPT | Performed by: RADIOLOGY

## 2022-04-26 PROCEDURE — C1750 CATH, HEMODIALYSIS,LONG-TERM: HCPCS

## 2022-04-26 PROCEDURE — 85025 COMPLETE CBC W/AUTO DIFF WBC: CPT | Performed by: INTERNAL MEDICINE

## 2022-04-26 PROCEDURE — 77001 FLUOROGUIDE FOR VEIN DEVICE: CPT | Performed by: RADIOLOGY

## 2022-04-26 RX ORDER — CEFAZOLIN SODIUM 2 G/50ML
SOLUTION INTRAVENOUS
Status: COMPLETED | OUTPATIENT
Start: 2022-04-26 | End: 2022-04-26

## 2022-04-26 RX ORDER — INSULIN GLARGINE 100 [IU]/ML
15 INJECTION, SOLUTION SUBCUTANEOUS
Status: DISCONTINUED | OUTPATIENT
Start: 2022-04-26 | End: 2022-04-27 | Stop reason: HOSPADM

## 2022-04-26 RX ORDER — MIDAZOLAM HYDROCHLORIDE 2 MG/2ML
INJECTION, SOLUTION INTRAMUSCULAR; INTRAVENOUS CODE/TRAUMA/SEDATION MEDICATION
Status: COMPLETED | OUTPATIENT
Start: 2022-04-26 | End: 2022-04-26

## 2022-04-26 RX ORDER — FENTANYL CITRATE 50 UG/ML
INJECTION, SOLUTION INTRAMUSCULAR; INTRAVENOUS CODE/TRAUMA/SEDATION MEDICATION
Status: COMPLETED | OUTPATIENT
Start: 2022-04-26 | End: 2022-04-26

## 2022-04-26 RX ADMIN — INSULIN LISPRO 1 UNITS: 100 INJECTION, SOLUTION INTRAVENOUS; SUBCUTANEOUS at 14:33

## 2022-04-26 RX ADMIN — METOPROLOL TARTRATE 50 MG: 50 TABLET, FILM COATED ORAL at 22:45

## 2022-04-26 RX ADMIN — INSULIN GLARGINE 15 UNITS: 100 INJECTION, SOLUTION SUBCUTANEOUS at 22:47

## 2022-04-26 RX ADMIN — FUROSEMIDE 80 MG: 10 INJECTION, SOLUTION INTRAMUSCULAR; INTRAVENOUS at 17:14

## 2022-04-26 RX ADMIN — CEFAZOLIN SODIUM 2000 MG: 2 SOLUTION INTRAVENOUS at 10:47

## 2022-04-26 RX ADMIN — METOPROLOL TARTRATE 50 MG: 50 TABLET, FILM COATED ORAL at 09:15

## 2022-04-26 RX ADMIN — FENTANYL CITRATE 25 MCG: 50 INJECTION INTRAMUSCULAR; INTRAVENOUS at 11:10

## 2022-04-26 RX ADMIN — FENTANYL CITRATE 50 MCG: 50 INJECTION INTRAMUSCULAR; INTRAVENOUS at 10:51

## 2022-04-26 RX ADMIN — Medication 20 ML: at 11:05

## 2022-04-26 RX ADMIN — TAMSULOSIN HYDROCHLORIDE 0.4 MG: 0.4 CAPSULE ORAL at 17:14

## 2022-04-26 RX ADMIN — FENTANYL CITRATE 25 MCG: 50 INJECTION INTRAMUSCULAR; INTRAVENOUS at 11:06

## 2022-04-26 RX ADMIN — APIXABAN 5 MG: 5 TABLET, FILM COATED ORAL at 17:14

## 2022-04-26 RX ADMIN — FENTANYL CITRATE 25 MCG: 50 INJECTION INTRAMUSCULAR; INTRAVENOUS at 11:12

## 2022-04-26 RX ADMIN — MIDAZOLAM 1 MG: 1 INJECTION INTRAMUSCULAR; INTRAVENOUS at 10:51

## 2022-04-26 RX ADMIN — INSULIN LISPRO 2 UNITS: 100 INJECTION, SOLUTION INTRAVENOUS; SUBCUTANEOUS at 17:20

## 2022-04-26 RX ADMIN — MIDAZOLAM 0.5 MG: 1 INJECTION INTRAMUSCULAR; INTRAVENOUS at 11:14

## 2022-04-26 RX ADMIN — MIDAZOLAM 0.5 MG: 1 INJECTION INTRAMUSCULAR; INTRAVENOUS at 11:03

## 2022-04-26 RX ADMIN — MIDAZOLAM 0.5 MG: 1 INJECTION INTRAMUSCULAR; INTRAVENOUS at 11:10

## 2022-04-26 RX ADMIN — SEVELAMER HYDROCHLORIDE 800 MG: 800 TABLET, FILM COATED PARENTERAL at 17:14

## 2022-04-26 RX ADMIN — PRAVASTATIN SODIUM 40 MG: 40 TABLET ORAL at 17:14

## 2022-04-26 RX ADMIN — FUROSEMIDE 80 MG: 10 INJECTION, SOLUTION INTRAMUSCULAR; INTRAVENOUS at 09:15

## 2022-04-26 NOTE — PROGRESS NOTES
H&P reviewed  There have been no interval changes since the time the H&P was written  /61   Pulse 87   Temp 98 3 °F (36 8 °C)   Resp 16   Ht 5' 10" (1 778 m)   Wt 92 6 kg (204 lb 2 3 oz)   SpO2 92%   BMI 29 29 kg/m²     Prior imaging was reviewed  20-year-old man with renal failure referred for tunneled hemodialysis catheter placement  Unfortunately he has a right-sided IJ port being treated actively for colon cancer  He also has a left subclavian pacemaker which he has had for about 5 years  I scanned is neck with ultrasound of both sides  Right IJ is patent  I do not see a right external jugular vein that I could used for access  Left IJ is patent but he has chest wall collaterals over his pacemaker highly suggestive of a central stenosis or occlusion and left IJ access is less optimal   There is also less options to tunneled catheter given the size and location of the pacemaker  Will place catheter alongside right IJ port catheter  Rationale discussed with the patient  Procedure discussed as well  All questions answered  Possibility of disrupting port catheter requiring revision discussed  Informed written consent was obtained      Jennifer Moran MD

## 2022-04-26 NOTE — PROGRESS NOTES
[address] [facility]  Progress Note - Jennifer Reardon 1955, 74 y o  male MRN: [id number]  [facility] Encounter: [id number]  Primary Care Provider: Dorothy Villatoro DO   Date and time admitted to hospital: 4/22/2022  5:30 PM      * ELZBIETA (acute kidney injury) on chronic kidney disease stage 4  Assessment & Plan  Patient with history of CKD for awaiting AV fistula placement for future dialysis  -unfortunately over the past several weeks has had worsening creatinine now elevated up to 6  -patient has been taking metolazone twice a week , along with daily 40 mg torsemide in the morning and 20 mg torsemide in the p m, for lower extremity edema that has unfortunately been nonresponsive to treatment  -hold all nephrotoxins  -urinalysis - bland, protein creatinine ratio -2 33  -nephrology consult     4/23-appreciate urology recommendations, have initiated Lasix infusion and will monitor for improvement  Consider PermCath placement in 24-48 hours if does not improve with Lasix and supportive care  Nephrology note, patient has consented to dialysis if needed  4/25-unfortunately, no improvement creatinine; patient will require dialysis  NPO at midnight and PermCath to be placed tomorrow    Will start dialysis following that and will need chair time prior to be discharged the outpatient setting  4/26-underwent PermCath conversion today with continued dialysis afterwards - will require outpatient dialysis chair time to be set up prior to discharge    Insulin-dependent diabetes mellitus  Assessment & Plan        Lab Results   Component Value Date     HGBA1C 6 0 (H) 02/18/2022     Continue basal insulin w/ additional SSI coverage per Accu-Cheks    Peripheral artery disease  Assessment & Plan  S/p multiple toe amputations  Continue statin     Hyponatremia  Assessment & Plan  In the setting of CHF  Management via hemodialysis  Monitor serum sodium     Chronic diastolic CHF  Assessment & Plan  Fluid management now via hemodialysis   Previously on a Lasix drip -> currently transitioned to 80 mg IV BID   Continue beta-blockade w/ Lopressor  Monitor and replete serum magnesium/potassium deficiencies if present  Continue fluid restriction  Echocardiogram earlier this month w/ an EF of 60% without evidence of regional LV wall motion abnormalities, however, noting moderate AR/MS, mild-moderate AS/MR, moderate-severe TR/pulmonary HTN and mild MA     NICOLASA (obstructive sleep apnea)  Assessment & Plan  On CPAP QHS     PAF (paroxysmal atrial fibrillation)  Assessment & Plan  Rate controlled on Lopressor  On Eliquis for anticoagulation  Biatrial dilatation noted on recent echocardiogram     Essential hypertension  Assessment & Plan  Continue Lopressor    Anemia of chronic disease  Assessment & Plan  Monitor H/H      DVT Prophylaxis:  Eliquis       Patient Centered Rounds:  I have performed bedside rounds and discussed plan of care with nursing today  Discussions with Specialists or Other Care Team Provider:  see above assessments if applicable    Education and Discussions with Family / Patient: Patient at bedside - discussed with wife, at bedside, earlier in the day    Time Spent for Care:  32 minutes  More than 50% of total time spent on counseling and coordination of care as described above  Current Length of Stay: 4 day(s)    Current Patient Status: Inpatient   Certification Statement:  Patient will continue to require additional hospital stay due to assessments as noted above  Code Status: Level 1 - Full Code        Subjective:     Seen/examined later in the afternoon after returning from hemodialysis  No new complaints this time  Underwent PermCath placement earlier in the morning which he tolerated quite well          Objective:     Vitals:   Temp (24hrs), Av 8 °F (36 6 °C), Min:97 4 °F (36 3 °C), Max:98 3 °F (36 8 °C)    Temp:  [97 4 °F (36 3 °C)-98 3 °F (36 8 °C)] 97 4 °F (36 3 °C)  HR: [59-87] 64  Resp:  [12-16] 16  BP: (109-163)/(57-79) 136/57  SpO2:  [92 %-100 %] 97 %  Body mass index is 29 29 kg/m²  Input and Output Summary (last 24 hours): Intake/Output Summary (Last 24 hours) at 4/26/2022 1716  Last data filed at 4/26/2022 1350  Gross per 24 hour   Intake 500 ml   Output 1950 ml   Net -1450 ml       Physical Exam:     GENERAL:  Remains weak/fatigued - no immediate distress at rest  HEAD:  Normocephalic - atraumatic  EYES: PERRL - EOMI   MOUTH:  Mucosa moist  NECK:  Supple - full range of motion  CARDIAC:  Rate controlled - S1/S2 positive  PULMONARY:  Fairly clear to auscultation - nonlabored respirations  ABDOMEN:  Soft - nontender/nondistended - active bowel sounds  MUSCULOSKELETAL:  Motor strength/range of motion intact - trace distal LE edema  NEUROLOGIC:  Alert/oriented at baseline  SKIN:  Chronic wrinkles/blemishes   PSYCHIATRIC:  Mood/affect pleasant      Additional Data:     Labs & Recent Cultures:    Results from last 7 days   Lab Units 04/26/22  0508   WBC Thousand/uL 10 67*   HEMOGLOBIN g/dL 9 4*   HEMATOCRIT % 29 0*   PLATELETS Thousands/uL 323   NEUTROS PCT % 76*   LYMPHS PCT % 10*   MONOS PCT % 10   EOS PCT % 3     Results from last 7 days   Lab Units 04/26/22  0508 04/25/22  0450 04/25/22  0450   POTASSIUM mmol/L 3 8   < > 3 4*   CHLORIDE mmol/L 100   < > 98*   CO2 mmol/L 30   < > 27   BUN mg/dL 113*   < > 122*   CREATININE mg/dL 5 54*   < > 5 77*   CALCIUM mg/dL 9 8   < > 9 8   ALK PHOS U/L  --   --  100   ALT U/L  --   --  20   AST U/L  --   --  15    < > = values in this interval not displayed       Results from last 7 days   Lab Units 04/26/22  0508   INR  1 88*     Results from last 7 days   Lab Units 04/26/22  1553 04/26/22  1430 04/26/22  0825 04/25/22  2119 04/25/22  1550 04/25/22  1105 04/25/22  0837 04/24/22  2102 04/24/22  1615 04/24/22  1124 04/24/22  0806 04/23/22  2047   POC GLUCOSE mg/dl 305* 213* 107 192* 221* 180* 162* 186* 206* 188* 109 214* Last 24 Hours Medication List:   Current Facility-Administered Medications   Medication Dose Route Frequency Provider Last Rate    apixaban  5 mg Oral BID Bro Banai, DO      furosemide  80 mg Intravenous BID (diuretic) Elizabeth Lassiter, DO      insulin glargine  10 Units Subcutaneous HS Bro Duran, DO      insulin lispro  1-5 Units Subcutaneous TID AC Bro Duran, DO      metoprolol tartrate  50 mg Oral Q12H [facility] Bro Duran, DO      ondansetron  4 mg Intravenous Q6H PRN Bro Duran, DO      pravastatin  40 mg Oral Daily With [employer], DO      sevelamer  800 mg Oral TID With Meals Courtney Jamison, DO      tamsulosin  0 4 mg Oral Daily With [employer], DO                  ** Please Note: This note is constructed using a voice recognition dictation system  An occasional wrong word/phrase or sound-a-like substitution may have been picked up by dictation device due to the inherent limitations of voice recognition software  Read the chart carefully and recognize, using reasonable context, where substitutions may have occurred  **

## 2022-04-26 NOTE — PLAN OF CARE
For 2 hr tx, 1st tx, UF1L as tolerated, 4K bath  Problem: METABOLIC, FLUID AND ELECTROLYTES - ADULT  Goal: Electrolytes maintained within normal limits  Description: INTERVENTIONS:  - Monitor labs and assess patient for signs and symptoms of electrolyte imbalances  - Administer electrolyte replacement as ordered  - Monitor response to electrolyte replacements, including repeat lab results as appropriate  - Instruct patient on fluid and nutrition as appropriate  Outcome: Progressing  Goal: Fluid balance maintained  Description: INTERVENTIONS:  - Monitor labs   - Monitor I/O and WT  - Instruct patient on fluid and nutrition as appropriate  - Assess for signs & symptoms of volume excess or deficit  Outcome: Progressing

## 2022-04-26 NOTE — PLAN OF CARE
Problem: PAIN - ADULT  Goal: Verbalizes/displays adequate comfort level or baseline comfort level  Description: Interventions:  - Encourage patient to monitor pain and request assistance  - Assess pain using appropriate pain scale  - Administer analgesics based on type and severity of pain and evaluate response  - Implement non-pharmacological measures as appropriate and evaluate response  - Consider cultural and social influences on pain and pain management  - Notify physician/advanced practitioner if interventions unsuccessful or patient reports new pain  Outcome: Progressing     Problem: INFECTION - ADULT  Goal: Absence or prevention of progression during hospitalization  Description: INTERVENTIONS:  - Assess and monitor for signs and symptoms of infection  - Monitor lab/diagnostic results  - Monitor all insertion sites, i e  indwelling lines, tubes, and drains  - Monitor endotracheal if appropriate and nasal secretions for changes in amount and color  - [place] appropriate cooling/warming therapies per order  - Administer medications as ordered  - Instruct and encourage patient and family to use good hand hygiene technique  - Identify and instruct in appropriate isolation precautions for identified infection/condition  Outcome: Progressing  Goal: Absence of fever/infection during neutropenic period  Description: INTERVENTIONS:  - Monitor WBC    Outcome: Progressing     Problem: SAFETY ADULT  Goal: Patient will remain free of falls  Description: INTERVENTIONS:  - Educate patient/family on patient safety including physical limitations  - Instruct patient to call for assistance with activity   - Consult OT/PT to assist with strengthening/mobility   - Keep Call bell within reach  - Keep bed low and locked with side rails adjusted as appropriate  - Keep care items and personal belongings within reach  - Initiate and maintain comfort rounds  - Make Fall Risk Sign visible to staff  - Offer Toileting every  Hours, in advance of need  - Initiate/Maintain alarm  - Obtain necessary fall risk management equipment:   - Apply yellow socks and bracelet for high fall risk patients  - Consider moving patient to room near nurses station  Outcome: Progressing  Goal: Maintain or return to baseline ADL function  Description: INTERVENTIONS:  -  Assess patient's ability to carry out ADLs; assess patient's baseline for ADL function and identify physical deficits which impact ability to perform ADLs (bathing, care of mouth/teeth, toileting, grooming, dressing, etc )  - Assess/evaluate cause of self-care deficits   - Assess range of motion  - Assess patient's mobility; develop plan if impaired  - Assess patient's need for assistive devices and provide as appropriate  - Encourage maximum independence but intervene and supervise when necessary  - Involve family in performance of ADLs  - Assess for home care needs following discharge   - Consider OT consult to assist with ADL evaluation and planning for discharge  - Provide patient education as appropriate  Outcome: Progressing  Goal: Maintains/Returns to pre admission functional level  Description: INTERVENTIONS:  - Perform BMAT or MOVE assessment daily    - Set and communicate daily mobility goal to care team and patient/family/caregiver  - Collaborate with rehabilitation services on mobility goals if consulted  - Perform Range of Motion  times a day  - Reposition patient every  hours    - Dangle patient  times a day  - Stand patient  times a day  - Ambulate patient  times a day  - Out of bed to chair  times a day   - Out of bed for meals times a day  - Out of bed for toileting  - Record patient progress and toleration of activity level   Outcome: Progressing     Problem: DISCHARGE PLANNING  Goal: Discharge to home or other facility with appropriate resources  Description: INTERVENTIONS:  - Identify barriers to discharge w/patient and caregiver  - Arrange for needed discharge resources and transportation as appropriate  - Identify discharge learning needs (meds, wound care, etc )  - Arrange for interpretive services to assist at discharge as needed  - Refer to Case Management Department for coordinating discharge planning if the patient needs post-hospital services based on physician/advanced practitioner order or complex needs related to functional status, cognitive ability, or social support system  Outcome: Progressing     Problem: Knowledge Deficit  Goal: Patient/family/caregiver demonstrates understanding of disease process, treatment plan, medications, and discharge instructions  Description: Complete learning assessment and assess knowledge base    Interventions:  - Provide teaching at level of understanding  - Provide teaching via preferred learning methods  Outcome: Progressing     Problem: Potential for Falls  Goal: Patient will remain free of falls  Description: INTERVENTIONS:  - Educate patient/family on patient safety including physical limitations  - Instruct patient to call for assistance with activity   - Consult OT/PT to assist with strengthening/mobility   - Keep Call bell within reach  - Keep bed low and locked with side rails adjusted as appropriate  - Keep care items and personal belongings within reach  - Initiate and maintain comfort rounds  - Make Fall Risk Sign visible to staff  - Offer Toileting every  Hours, in advance of need  - Initiate/Maintain alarm  - Obtain necessary fall risk management equipment:   - Apply yellow socks and bracelet for high fall risk patients  - Consider moving patient to room near nurses station  Outcome: Progressing

## 2022-04-26 NOTE — DISCHARGE INSTRUCTIONS
Perma-cath Placement     WHAT YOU NEED TO KNOW:   A perma-cath is a catheter placed through a vein into or near your right atrium  Your right atrium is the right upper chamber of your heart  A perma-cath is used for dialysis in an emergency or until a long-term device is ready to use  After your procedure, you will have some pain and swelling on your chest and neck  You may have some bruises on your chest and neck  You may also have 2 dressings, one on your chest and one on your neck  DISCHARGE INSTRUCTIONS:   Call 911 for any of the following:   · You feel lightheaded, short of breath, and have chest pain  · Your catheter comes out     Contact Interventional Radiology at [phone] Anand PATIENTS: Contact Interventional Radiology at [phone]) Maty Gibbs PATIENTS: Contact Interventional Radiology at [phone]) if:  · Blood soaks through your bandage  · You have new swelling in your arm, neck, face, or chest on your right side  · Your catheter gets wet  · Your bruises or pain get worse  · You have a fever or chills  · Persistent nausea or vomiting  · Your incision is red, swollen, or draining pus  · You have questions or concerns about your condition or care  Self-care:   · Resume your normal diet  · Keep your dressings dry  Do not take a shower or swim  You may take a tub bath, but do not get your dressings wet  Water in your wound can cause bacteria to grow and cause an infection  If your dressing gets wet, dry it off and cover it with dry sterile gauze  Call your healthcare provider  Do not use soaps or ointments  · Do not change your dressings  Your healthcare provider or dialysis nurse will change your dressings  Your dressings should stay in place until your healthcare provider removes them  The dressing on your chest will stay as long as you have the catheter in place  The dressing prevents infection  · Do not remove the red and blue caps from the end of your catheter   The caps prevent air from getting into your catheter  Follow up with your healthcare provider as directed: Write down your questions so you remember to ask them during your visits        Discharge Instructions - Podiatry    Weight Bearing Status: Weight bearing as tolerated                    Pain: Continue analgesics as directed    Follow-up appointment instructions: Please make an appointment within one week of discharge with Dr Michael Morse  Contact sooner if any increase in pain, or signs of infection occur    Wound Care: Leave dressings clean, dry, and intact between professional dressing changes  If dressing get wet, soiled or removed please call the office immediately for instruction  Nursing Instructions: Please apply Hailey Martinezt  Then cover with Gauze and secure with Kerlix and tape  Please change dressing every Monday, Wednesday, and Friday

## 2022-04-26 NOTE — SEDATION DOCUMENTATION
Right HD permacath placement performed by Dr Diana Akins  Procedure tolerated well, VSS  IR Procedure Bedrest Start Time is 1120

## 2022-04-26 NOTE — HEMODIALYSIS
UF goal of 1L was not met because UF was switched off on the last 5 minutes of the tx d/t pt's report of cramps at his entire right leg which was resolved in a few minutes  Post-Dialysis RN Treatment Note   Blood Pressure:  Pre 129/67  mm/Hg  Post 144/79 mmHg   EDW  TBD kg    Weight:  Pre Not Applicable kg   Post Not Applicable kg   Mode of weight measurement: N/A pt on a stretcher   Volume Removed  950 ml NET   Treatment duration 120 minutes    NS given  No    Treatment shortened?  No   Medications given during Rx None Reported   Estimated Kt/V  Not Applicable   Access type: Permacath/TDC   Access Issues: No    Report called to primary nurse   Yes, to Steve Ordonez

## 2022-04-26 NOTE — PROGRESS NOTES
NEPHROLOGY PROGRESS NOTE   Jose Curiel 74 y o  male MRN: [id number]  Unit/Bed#: [facility] 905-01 Encounter: [id number]      Hemodialysis:  Patient was seen on hemodialysis today at the initiation of treatment around 1145 a m  Appreciate interventional radiology placement of tunneled dialysis catheter    ASSESSMENT & PLAN    1  ELZBIETA on stage 4-5 chronic kidney disease  -with worsening azotemia, chemotherapy now on hold given GFR  -he is non oliguric however severely azotemic and has had multiple hospitalizations with acute kidney injury  -with multiple hospitalizations with volume overload estimated GFR less than 15 needs continued chemotherapy and now with severe azotemia with multiple hospitalizations required dialysis previously  -indication for dialysis:  Volume management and azotemia     2  Hypokalemia  -continue potassium repletion     3  Volume overload with pulmonary edema and increasing lower extremity edema  -with pulmonary edema and lower extremity edema  -breathing is currently stable but does a pulmonary vascular congestion will continue fluid removal and challenging weight  -Weight is significantly decreased blood pressures are stable  -creatinine remains elevated  -continue Lasix 80 mg IV b i d  While in the hospital  -volume management subsequently with dialysis     4  Sigmoid colon malignancy  -chemotherapy currently on hold  -notified Hematology regarding initiation of dialysis     5  Metabolic acidosis  -no anion gap, bicarb 27 will hold sodium bicarbonate tablet for now     6  Hypertension  -continue metoprolol 50 mg twice daily    DISCUSSION/SUMMARY    Tolerating dialysis continue volume removal  Next treatment tomorrow    SUBJECTIVE:    Patient was seen today  Tolerating treatment  Good fluid removal  No chest pain or shortness of breath    12 point review of systems was otherwise negative besides what is mentioned above      Medications:    Current Facility-Administered Medications:     apixaban (ELIQUIS) tablet 5 mg, 5 mg, Oral, BID, Bro Duran, DO, 5 mg at 04/25/22 1815    furosemide (LASIX) injection 80 mg, 80 mg, Intravenous, BID (diuretic), Eladio Patton, DO, 80 mg at 04/26/22 0915    insulin glargine (LANTUS) subcutaneous injection 10 Units 0 1 mL, 10 Units, Subcutaneous, HS, rBo Duran, DO, 10 Units at 04/25/22 2120    insulin lispro (HumaLOG) 100 units/mL subcutaneous injection 1-5 Units, 1-5 Units, Subcutaneous, TID AC, 1 Units at 04/25/22 1816 **AND** Fingerstick Glucose (POCT), , , TID AC, Bro Druan, DO    metoprolol tartrate (LOPRESSOR) tablet 50 mg, 50 mg, Oral, Q12H [facility], Bro Duran, DO, 50 mg at 04/26/22 0915    ondansetron (ZOFRAN) injection 4 mg, 4 mg, Intravenous, Q6H PRN, Bro Duran, DO    pravastatin (PRAVACHOL) tablet 40 mg, 40 mg, Oral, Daily With Dinner, Bro Duran DO, 40 mg at 04/25/22 1815    sevelamer (RENAGEL) tablet 800 mg, 800 mg, Oral, TID With Meals, Kamron Jamison, DO, 800 mg at 04/25/22 1815    tamsulosin (FLOMAX) capsule 0 4 mg, 0 4 mg, Oral, Daily With Dinner, Neyda Leiva DO, 0 4 mg at 04/25/22 1815    OBJECTIVE:    Vitals:    04/26/22 1200 04/26/22 1230 04/26/22 1300 04/26/22 1330   BP: 126/64 109/61 123/76 109/65   BP Location:       Pulse: 60 61 60 60   Resp: 15 16 12 14   Temp:       TempSrc:       SpO2:       Weight:       Height:            Temp:  [97 5 °F (36 4 °C)-98 3 °F (36 8 °C)] 97 9 °F (36 6 °C)  HR:  [59-87] 60  Resp:  [12-16] 14  BP: (109-163)/(61-76) 109/65  SpO2:  [92 %-100 %] 93 %     Body mass index is 29 29 kg/m²  Weight (last 2 days)     Date/Time Weight    04/24/22 0550 92 6 (204 15)          I/O last 3 completed shifts:   In: 79 6 [I V :79 6]  Out: 4000 [Urine:4000]    I/O this shift:  In: 200 [I V :200]  Out: -       Physical exam:    General: no acute distress, cooperative  Eyes: conjunctivae pink, anicteric sclerae  ENT: lips and mucous membranes moist, no exudates, normal external ears  Neck: ROM intact, no JVD  Chest:  Decreased breath sounds  CVS: normal rate, non pericardial friction rub  Abdomen: soft, non-tender, non-distended, normoactive bowel sounds  Extremities:  Trace edema  Skin: no rash  Neuro: awake, alert, oriented, grossly intact  Psych:  Pleasant affect    Invasive Devices:      Lab Results:   Results from last 7 days   Lab Units 04/26/22  0508 04/25/22  0450 04/24/22  2128 04/24/22  0448 04/23/22  0527 04/23/22  0524 04/22/22  1759 04/22/22  1759 04/22/22  0858 04/22/22  0858   WBC Thousand/uL 10 67* 10 44*  --  10 32*  --  13 90*  --  13 65*   < > 11 93*   HEMOGLOBIN g/dL 9 4* 10 4*  --  9 2*  --  9 9*  --  10 1*   < > 9 8*   HEMATOCRIT % 29 0* 31 0*  --  26 9*  --  29 0*  --  29 3*   < > 29 4*   PLATELETS Thousands/uL 323 360  --  331  --  366  --  320   < > 350   POTASSIUM mmol/L 3 8 3 4* 3 9 2 7*  --  3 5   < > 3 7   < > 3 6   CHLORIDE mmol/L 100 98*  --  98*  --  97*  --  95*   < > 95*   CO2 mmol/L 30 27  --  24  --  23  --  22   < > 22   BUN mg/dL 113* 122*  --  125*  --  128*  --  136*   < > 136*   CREATININE mg/dL 5 54* 5 77*  --  5 77*  --  6 28*  --  6 51*   < > 6 76*   CALCIUM mg/dL 9 8 9 8  --  9 4  --  9 2  --  9 2   < > 9 0   MAGNESIUM mg/dL  --  2 3  --   --   --  2 4  --   --   --  2 3   PHOSPHORUS mg/dL  --  5 0*  --   --   --  5 9*  --   --   --   --    ALK PHOS U/L  --  100  --   --   --   --   --  129*  --  109   ALT U/L  --  20  --   --   --   --   --  25  --  22   AST U/L  --  15  --   --   --   --   --  16  --  15   LEUKOCYTES UA   --   --   --   --  Negative  --   --   --   --   --    BLOOD UA   --   --   --   --  Negative  --   --   --   --   --     < > = values in this interval not displayed  Portions of the record may have been created with voice recognition software  Occasional wrong word or "sound a like" substitutions may have occurred due to the inherent limitations of voice recognition software   Read the chart carefully and recognize, using context, where substitutions have occurred  If you have any questions, please contact the dictating provider

## 2022-04-26 NOTE — CASE MANAGEMENT
Case Management Discharge Planning Note    Patient name Wilfredo Santana  Location [address] [place] [address] [address] [facility]N [id number]  : 1955 Date 2022       Current Admission Date: 2022  Current Admission Diagnosis:EL[facility]BIETA (acute kidney injury) [facility])   Patient Active Problem List    Diagnosis Date Noted    Other iron deficiency anemias 04/15/2022    Acute on chronic diastolic congestive heart failure ([facility] ) 2022    Chemotherapy-induced nausea 2022    Chemotherapy-induced neutropenia ([facility] ) 2022    Acute kidney injury superimposed on chronic kidney disease ([place] [address] ) 2021    RSV (respiratory syncytial virus pneumonia) 2021    Colon cancer ([place] [address] ) 10/23/2021    Absence of toe ([place] [address] ) 2021    Chronic obstructive pulmonary disease ([facility] ) 2020    SSS (sick sinus syndrome) ([place] [address] ) 2020    Onychomycosis 2020    Chronic painful diabetic neuropathy ([place] [address] ) 2020    Renovascular hypertension     Multiple drug resistant organism (MDRO) culture positive 2020    Amputation of left great toe ([facility] ) 2020    Osteomyelitis of left foot ([place] [address] ) 2020    Hyponatremia 2020    Iron deficiency anemia 2020    Anxiety 2020    Staphylococcus aureus bacteremia 2020    Elevated troponin I level 2020    Type 2 diabetes mellitus with diabetic neuropathy, without long-term current use of insulin ([facility] ) 2020    S/P amputation of lesser toe, left ([facility] ) 2019    S/P amputation of lesser toe, right ([facility] ) 2019    Left renal artery stenosis (HCC) 2019    Chronic diastolic (congestive) heart failure (HCC) 2019    Acute pulmonary edema (HCC)     Hyperkalemia 2019    Abnormal CT of the chest 05/15/2019    Mitral valve stenosis     Anemia 2019    Fever 2019    Nonrheumatic aortic valve stenosis 2018    Type 2 diabetes mellitus with chronic kidney disease, with long-term current use of insulin ([place] [address] ) 11/14/2018    Hypertension 11/14/2018    Stage 4 chronic kidney disease ([place] [address] ) 11/14/2018    NICOLASA (obstructive sleep apnea) 11/11/2018    Urinary retention 11/06/2018    Persistent proteinuria 11/05/2018    Volume overload 11/05/2018    PAF (paroxysmal atrial fibrillation) ([place] [address] ) 11/04/2018    Complete heart block (HCC) 49/67/2232    Metabolic acidosis 29/97/0657    ELZBIETA (acute kidney injury) ([place] [address] ) 11/01/2018    Other hyperlipidemia 11/01/2018    Easy bruising 09/27/2017    Peripheral arterial disease ([place] [address] ) 09/06/2017    Vertigo 08/16/2017    Diabetic ulcer of toe of left foot associated with type 2 diabetes mellitus ([place] [address] ) 03/18/2017    Eczema 12/11/2015    PVCs (premature ventricular contractions) 55/36/0608    Systolic murmur 72/63/9803    Bilateral leg edema 07/09/2015    Erectile dysfunction of non-organic origin 04/24/2012    Gout 04/24/2012    Hyperlipidemia 04/24/2012    Uremic acidosis 04/24/2012    Arthritis 04/24/2012    Rhinitis 04/24/2012      LOS (days): 4  Geometric Mean LOS (GMLOS) (days): 3 10  Days to GMLOS:-0 7     OBJECTIVE:  Risk of Unplanned Readmission Score: 36         Current admission status: Inpatient   Preferred Pharmacy:   [id number] [address]  Phone: [phone] Fax: [phone]    Primary Care Provider: Shira Rodriguez, DO    Primary Insurance: MEDICARE  Secondary Insurance: [facility] [place]    DISCHARGE DETAILS:    Discharge planning discussed with[de-identified] patient  Freedom of Choice: Yes     CM contacted family/caregiver?: Yes  Were Treatment Team discharge recommendations reviewed with patient/caregiver?: Yes  Did patient/caregiver verbalize understanding of patient care needs?: Yes  Were patient/caregiver advised of the risks associated with not following Treatment Team discharge recommendations?: Yes                   Other Referral/Resources/Interventions Provided:  Referral Comments: patient is recommended for d/c to home with new start dialysis  No rehab needs at time of discharge  Patient did have his first dialysis session today  Family is requesting [place] with am start time, however states that 6am is too early  Request and clinicals at this time has been faxed over to [phone] intake for consideration  Currently awaiting determination at this time  UPDATE: CALL RECEIVED FROM NICOLE AT [facility]  SHE STATED THAT THE ASSIGNED COORDINATOR FOR THE REQUEST IS CLAY[place]  SHE CAN BE REACHED AT [facility] #[id number]  THE SERVICE REQUEST NUMBER ASSIGNED IS [id number]  CURRENTLY AWAITING FINAL DETERMINATION

## 2022-04-27 ENCOUNTER — APPOINTMENT (INPATIENT)
Dept: DIALYSIS | Facility: HOSPITAL | Age: 67
DRG: 674 | End: 2022-04-27
Attending: INTERNAL MEDICINE
Payer: MEDICARE

## 2022-04-27 VITALS
RESPIRATION RATE: 16 BRPM | TEMPERATURE: 97.4 F | HEIGHT: 70 IN | HEART RATE: 63 BPM | WEIGHT: 204.15 LBS | OXYGEN SATURATION: 94 % | BODY MASS INDEX: 29.23 KG/M2 | DIASTOLIC BLOOD PRESSURE: 55 MMHG | SYSTOLIC BLOOD PRESSURE: 140 MMHG

## 2022-04-27 PROBLEM — I48.19 PERSISTENT ATRIAL FIBRILLATION (HCC): Status: ACTIVE | Noted: 2018-11-04

## 2022-04-27 LAB
ANION GAP SERPL CALCULATED.3IONS-SCNC: 6 MMOL/L (ref 4–13)
BASOPHILS # BLD AUTO: 0.05 THOUSANDS/ΜL (ref 0–0.1)
BASOPHILS NFR BLD AUTO: 0 % (ref 0–1)
BUN SERPL-MCNC: 74 MG/DL (ref 5–25)
CALCIUM SERPL-MCNC: 8.9 MG/DL (ref 8.3–10.1)
CHLORIDE SERPL-SCNC: 99 MMOL/L (ref 100–108)
CO2 SERPL-SCNC: 28 MMOL/L (ref 21–32)
CREAT SERPL-MCNC: 4.23 MG/DL (ref 0.6–1.3)
EOSINOPHIL # BLD AUTO: 0.39 THOUSAND/ΜL (ref 0–0.61)
EOSINOPHIL NFR BLD AUTO: 3 % (ref 0–6)
ERYTHROCYTE [DISTWIDTH] IN BLOOD BY AUTOMATED COUNT: 15.9 % (ref 11.6–15.1)
GFR SERPL CREATININE-BSD FRML MDRD: 13 ML/MIN/1.73SQ M
GLUCOSE SERPL-MCNC: 129 MG/DL (ref 65–140)
GLUCOSE SERPL-MCNC: 137 MG/DL (ref 65–140)
GLUCOSE SERPL-MCNC: 149 MG/DL (ref 65–140)
GLUCOSE SERPL-MCNC: 159 MG/DL (ref 65–140)
HCT VFR BLD AUTO: 32.9 % (ref 36.5–49.3)
HGB BLD-MCNC: 10.9 G/DL (ref 12–17)
IMM GRANULOCYTES # BLD AUTO: 0.12 THOUSAND/UL (ref 0–0.2)
IMM GRANULOCYTES NFR BLD AUTO: 1 % (ref 0–2)
LYMPHOCYTES # BLD AUTO: 0.4 THOUSANDS/ΜL (ref 0.6–4.47)
LYMPHOCYTES NFR BLD AUTO: 3 % (ref 14–44)
MAGNESIUM SERPL-MCNC: 1.8 MG/DL (ref 1.6–2.6)
MCH RBC QN AUTO: 31.1 PG (ref 26.8–34.3)
MCHC RBC AUTO-ENTMCNC: 33.1 G/DL (ref 31.4–37.4)
MCV RBC AUTO: 94 FL (ref 82–98)
MONOCYTES # BLD AUTO: 1.27 THOUSAND/ΜL (ref 0.17–1.22)
MONOCYTES NFR BLD AUTO: 8 % (ref 4–12)
NEUTROPHILS # BLD AUTO: 13.02 THOUSANDS/ΜL (ref 1.85–7.62)
NEUTS SEG NFR BLD AUTO: 85 % (ref 43–75)
NRBC BLD AUTO-RTO: 0 /100 WBCS
PHOSPHATE SERPL-MCNC: 3.2 MG/DL (ref 2.3–4.1)
PLATELET # BLD AUTO: 333 THOUSANDS/UL (ref 149–390)
PMV BLD AUTO: 9.3 FL (ref 8.9–12.7)
POTASSIUM SERPL-SCNC: 3.4 MMOL/L (ref 3.5–5.3)
RBC # BLD AUTO: 3.5 MILLION/UL (ref 3.88–5.62)
SODIUM SERPL-SCNC: 133 MMOL/L (ref 136–145)
WBC # BLD AUTO: 15.25 THOUSAND/UL (ref 4.31–10.16)

## 2022-04-27 PROCEDURE — 80048 BASIC METABOLIC PNL TOTAL CA: CPT | Performed by: INTERNAL MEDICINE

## 2022-04-27 PROCEDURE — 5A1D70Z PERFORMANCE OF URINARY FILTRATION, INTERMITTENT, LESS THAN 6 HOURS PER DAY: ICD-10-PCS | Performed by: INTERNAL MEDICINE

## 2022-04-27 PROCEDURE — 83735 ASSAY OF MAGNESIUM: CPT | Performed by: INTERNAL MEDICINE

## 2022-04-27 PROCEDURE — 84100 ASSAY OF PHOSPHORUS: CPT | Performed by: INTERNAL MEDICINE

## 2022-04-27 PROCEDURE — 85025 COMPLETE CBC W/AUTO DIFF WBC: CPT | Performed by: INTERNAL MEDICINE

## 2022-04-27 PROCEDURE — 90935 HEMODIALYSIS ONE EVALUATION: CPT | Performed by: INTERNAL MEDICINE

## 2022-04-27 PROCEDURE — 99239 HOSP IP/OBS DSCHRG MGMT >30: CPT | Performed by: INTERNAL MEDICINE

## 2022-04-27 PROCEDURE — 82948 REAGENT STRIP/BLOOD GLUCOSE: CPT

## 2022-04-27 RX ORDER — SEVELAMER HYDROCHLORIDE 800 MG/1
800 TABLET, FILM COATED ORAL
Qty: 90 TABLET | Refills: 0 | Status: SHIPPED | OUTPATIENT
Start: 2022-04-27 | End: 2022-05-09 | Stop reason: SDUPTHER

## 2022-04-27 RX ORDER — TORSEMIDE 20 MG/1
40 TABLET ORAL DAILY
Status: DISCONTINUED | OUTPATIENT
Start: 2022-04-27 | End: 2022-04-27 | Stop reason: HOSPADM

## 2022-04-27 RX ADMIN — INSULIN LISPRO 1 UNITS: 100 INJECTION, SOLUTION INTRAVENOUS; SUBCUTANEOUS at 12:51

## 2022-04-27 RX ADMIN — SEVELAMER HYDROCHLORIDE 800 MG: 800 TABLET, FILM COATED PARENTERAL at 12:50

## 2022-04-27 RX ADMIN — METOPROLOL TARTRATE 50 MG: 50 TABLET, FILM COATED ORAL at 12:50

## 2022-04-27 RX ADMIN — TORSEMIDE 40 MG: 20 TABLET ORAL at 12:50

## 2022-04-27 RX ADMIN — APIXABAN 5 MG: 5 TABLET, FILM COATED ORAL at 12:50

## 2022-04-27 NOTE — CASE MANAGEMENT
Case Management Discharge Planning Note    Patient name Mariposa Perezh  [facility] [id number]  : 1955 Date 2022       Current Admission Date: 2022  Current Admission Diagnosis:ELZBIETA (acute kidney injury) [facility])   Patient Active Problem List    Diagnosis Date Noted    Other iron deficiency anemias 04/15/2022    Acute on chronic diastolic congestive [facility] failure [facility] ) 2022    Chemotherapy-induced nausea 2022    Chemotherapy-induced neutropenia ([facility] ) 2022    Acute kidney injury superimposed on chronic kidney disease ([place] [address] ) 2021    RSV (respiratory syncytial virus pneumonia) 2021    Colon cancer ([place] [address] ) 10/23/2021    Absence of toe ([place] [address] ) 2021    Chronic obstructive pulmonary disease ([place] [address] ) 2020    SSS (sick sinus syndrome) ([place] [address] ) 2020    Onychomycosis 2020    Chronic painful diabetic neuropathy ([place] [address] ) 2020    Renovascular hypertension     Multiple drug resistant organism (MDRO) culture positive 2020    Amputation of left great toe ([facility] ) 2020    Osteomyelitis of left foot ([place] [address] ) 2020    Hyponatremia 2020    Iron deficiency anemia 2020    Anxiety 2020    Staphylococcus aureus bacteremia 2020    Elevated troponin I level 2020    Type 2 diabetes mellitus with diabetic neuropathy, without long-term current use of insulin ([facility] ) 2020    S/P amputation of lesser toe, left ([facility] ) 2019    S/P amputation of lesser toe, right ([place] [address] ) 2019    Left renal artery stenosis (HCC) 2019    Chronic diastolic (congestive) heart failure (HCC) 2019    Acute pulmonary edema (HCC)     Hyperkalemia 2019    Abnormal CT of the chest 05/15/2019    Mitral valve stenosis     Anemia 2019    Fever 2019    Nonrheumatic aortic valve stenosis 2018    Type 2 diabetes mellitus with chronic kidney disease, with long-term current use of insulin ([place] [address] ) 11/14/2018    Hypertension 11/14/2018    Stage 4 chronic kidney disease ([place] [address] ) 11/14/2018    NICOLASA (obstructive sleep apnea) 11/11/2018    Urinary retention 11/06/2018    Persistent proteinuria 11/05/2018    Volume overload 11/05/2018    Persistent atrial fibrillation ([place] [address] ) 11/04/2018    Complete heart block (HCC) 57/42/8032    Metabolic acidosis 64/05/7008    ELZBIETA (acute kidney injury) ([place] [address] ) 11/01/2018    Other hyperlipidemia 11/01/2018    Easy bruising 09/27/2017    Peripheral arterial disease ([place] [address] ) 09/06/2017    Vertigo 08/16/2017    Diabetic ulcer of toe of left foot associated with type 2 diabetes mellitus ([place] [address] ) 03/18/2017    Eczema 12/11/2015    PVCs (premature ventricular contractions) 86/50/3957    Systolic murmur 26/38/2617    Bilateral leg edema 07/09/2015    Erectile dysfunction of non-organic origin 04/24/2012    Gout 04/24/2012    Hyperlipidemia 04/24/2012    Uremic acidosis 04/24/2012    Arthritis 04/24/2012    Rhinitis 04/24/2012      LOS (days): 5  Geometric Mean LOS (GMLOS) (days): 3 10  Days to GMLOS:-1 6     OBJECTIVE:  Risk of Unplanned Readmission Score: 36         Current admission status: Inpatient   Preferred Pharmacy:   [id number] [address] [facility] [address]  [address]  [address]  Phone: [phone] Fax: [phone]    Primary Care Provider: Shweta Whitaker DO    Primary Insurance: MEDICARE  Secondary Insurance: [facility]    DISCHARGE DETAILS:    Discharge planning discussed with[de-identified] Lacey (spouse) and patient  Freedom of Choice: Yes     CM contacted family/caregiver?: Yes  Were Treatment Team discharge recommendations reviewed with patient/caregiver?: Yes  Did patient/caregiver verbalize understanding of patient care needs?: Yes  Were patient/caregiver advised of the risks associated with not following Treatment Team discharge recommendations?: Yes    Contacts  Patient Contacts: Kristopher Fowler  Relationship to Patient[de-identified] Family  Contact Method: In Person  Reason/Outcome: Discharge Planning              Other Referral/Resources/Interventions Provided:  Referral Comments: received call from Marta Cuellar at [facility] dialysis - stated that patient has a confirmed chair time of 1130 MWF at [facility]  He will need to be there by 11am this friday 4/29/2022  Patient and family aware and in agreement  Pt medically cleared for d/c today

## 2022-04-27 NOTE — CASE MANAGEMENT
Case Management Discharge Planning Note    Patient name Satya Turpin  Location [address] [facility] [address] [facility] [id number]  : 1955 Date 2022       Current Admission Date: 2022  Current Admission Diagnosis:ELZBIETA (acute kidney injury) [facility])   Patient Active Problem List    Diagnosis Date Noted    Other iron deficiency anemias 04/15/2022    Acute on chronic diastolic congestive heart failure ([facility] ) 2022    Chemotherapy-induced nausea 2022    Chemotherapy-induced neutropenia ([facility] ) 2022    Acute kidney injury superimposed on chronic kidney disease ([facility] ) 2021    RSV (respiratory syncytial virus pneumonia) 2021    Colon cancer ([facility] ) 10/23/2021    Absence of toe ([place] [address] ) 2021    Chronic obstructive pulmonary disease ([facility] ) 2020    SSS (sick sinus syndrome) ([facility] ) 2020    Onychomycosis 2020    Chronic painful diabetic neuropathy ([facility] ) 2020    Renovascular hypertension     Multiple drug resistant organism (MDRO) culture positive 2020    Amputation of left great toe ([facility] ) 2020    Osteomyelitis of left foot ([facility] ) 2020    Hyponatremia 2020    Iron deficiency anemia 2020    Anxiety 2020    Staphylococcus aureus bacteremia 2020    Elevated troponin I level 2020    Type 2 diabetes mellitus with diabetic neuropathy, without long-term current use of insulin ([facility] ) 2020    S/P amputation of lesser toe, left ([facility] ) 2019    S/P amputation of lesser toe, right ([facility] ) 2019    Left renal artery stenosis (HCC) 2019    Chronic diastolic (congestive) heart failure (HCC) 2019    Acute pulmonary edema (HCC)     Hyperkalemia 2019    Abnormal CT of the chest 05/15/2019    Mitral valve stenosis     Anemia 2019    Fever 2019    Nonrheumatic aortic valve stenosis 2018    Type 2 diabetes mellitus with chronic kidney disease, with long-term current use of insulin ([place] [address] ) 11/14/2018    Hypertension 11/14/2018    Stage 4 chronic kidney disease ([place] [address] ) 11/14/2018    NICOLASA (obstructive sleep apnea) 11/11/2018    Urinary retention 11/06/2018    Persistent proteinuria 11/05/2018    Volume overload 11/05/2018    Persistent atrial fibrillation ([place] [address] ) 11/04/2018    Complete heart block (HCC) 19/01/9498    Metabolic acidosis 23/72/0822    ELZBIETA (acute kidney injury) ([place] [address] ) 11/01/2018    Other hyperlipidemia 11/01/2018    Easy bruising 09/27/2017    Peripheral arterial disease ([place] [address] ) 09/06/2017    Vertigo 08/16/2017    Diabetic ulcer of toe of left foot associated with type 2 diabetes mellitus ([place] [address] ) 03/18/2017    Eczema 12/11/2015    PVCs (premature ventricular contractions) 56/06/0330    Systolic murmur 52/30/2576    Bilateral leg edema 07/09/2015    Erectile dysfunction of non-organic origin 04/24/2012    Gout 04/24/2012    Hyperlipidemia 04/24/2012    Uremic acidosis 04/24/2012    Arthritis 04/24/2012    Rhinitis 04/24/2012      LOS (days): 5  Geometric Mean LOS (GMLOS) (days): 3 10  Days to GMLOS:-1 5     OBJECTIVE:  Risk of Unplanned Readmission Score: 36         Current admission status: Inpatient   Preferred Pharmacy[facility]   [id number] [address] [place] [place] - [address]  [address]  [address] [place] [address]  Phone: [phone] Fax: [phone]    Primary Care Provider: Vince Wright DO    Primary Insurance: MEDICARE  Secondary Insurance: [facility]    DISCHARGE DETAILS:      Other Referral/Resources/Interventions Provided:  Referral Comments: patient is medically cleared for d/c pending outpatient dialysis set up  patient has service request number [id number]  Called and spoke with Kait Shah  They were able to confirm that they have received all documentation in full and nothing further is needed  they have sent the request over to the facility and currently are awaiting final confirmation

## 2022-04-27 NOTE — PROGRESS NOTES
NEPHROLOGY PROGRESS NOTE   Willard Brian 74 y o  male MRN: [id number]  Unit/Bed#: [facility] 905-01 Encounter: [id number]      HD:  Procedure note seen on hemodialysis at approximately 9:10 a m  Tolerating his procedure without difficulty hemodynamics stable will attempt 2 L off    ASSESSMENT & PLAN    1  ELZBIETA on stage 4-5 chronic kidney disease  -with worsening azotemia, chemotherapy now on hold given GFR  -he is non oliguric however severely azotemic and has had multiple hospitalizations with acute kidney injury  -will plan on continuing hemodialysis given that he has had multiple hospitalizations, difficult with volume management and volume overload repeatedly with congestive heart failure will plan on continuing dialysis for now  -can monitor for renal recovery  -also would be home hemodialysis candidate should have training for this as well  -indication for dialysis:  Volume management and azotemia     2  Hypokalemia  -continue potassium repletion     3  Volume overload with pulmonary edema and increasing lower extremity edema  -with pulmonary edema and lower extremity edema  -breathing is currently stable but does a pulmonary vascular congestion will continue fluid removal and challenging weight  -Weight is significantly decreased blood pressures are stable  -transition to oral torsemide  -volume management subsequently with dialysis     4  Sigmoid colon malignancy  -chemotherapy currently on hold  -notified Hematology regarding initiation of dialysis  -they will discuss chemotherapy options with him accordingly     5  Metabolic acidosis  -no anion gap, bicarb 27 will hold sodium bicarbonate tablet for now     6  Hypertension  -continue metoprolol 50 mg twice daily    7   Anemia of chronic kidney disease  -hemoglobins currently acceptable hold ALFA in the setting of malignancy    DISCUSSION/SUMMARY    Continue dialysis awaiting chair time  Okay for discharge from a renal standpoint once chair time arranged    SUBJECTIVE:    Patient was seen today  No chest pain or shortness of breath  States he feels good good appetite no complaints    12 point review of systems was otherwise negative besides what is mentioned above  Medications:    Current Facility-Administered Medications:     apixaban (ELIQUIS) tablet 5 mg, 5 mg, Oral, BID, Bro Duran, DO, 5 mg at 04/26/22 1714    furosemide (LASIX) injection 80 mg, 80 mg, Intravenous, BID (diuretic), Eladio Patton, DO, 80 mg at 04/26/22 1714    insulin glargine (LANTUS) subcutaneous injection 15 Units 0 15 mL, 15 Units, Subcutaneous, HS, Ivonne Haley MD, 15 Units at 04/26/22 2247    insulin lispro (HumaLOG) 100 units/mL subcutaneous injection 1-5 Units, 1-5 Units, Subcutaneous, TID AC, 2 Units at 04/26/22 1720 **AND** Fingerstick Glucose (POCT), , , TID AC, Bro Duran DO    metoprolol tartrate (LOPRESSOR) tablet 50 mg, 50 mg, Oral, Q12H [facility], Bro Duran, DO, 50 mg at 04/26/22 2245    ondansetron (ZOFRAN) injection 4 mg, 4 mg, Intravenous, Q6H PRN, Bro Duran DO    pravastatin (PRAVACHOL) tablet 40 mg, 40 mg, Oral, Daily With Dinner, Bro Duran, DO, 40 mg at 04/26/22 1714    sevelamer (RENAGEL) tablet 800 mg, 800 mg, Oral, TID With Meals, Kelly Jamison, DO, 800 mg at 04/26/22 1714    tamsulosin (FLOMAX) capsule 0 4 mg, 0 4 mg, Oral, Daily With Dinner, Edwena Schaumann, DO, 0 4 mg at 04/26/22 1714    OBJECTIVE:    Vitals:    04/27/22 0809 04/27/22 0830 04/27/22 0840 04/27/22 0900   BP: 139/54 147/78 138/67 130/68   BP Location:  Right arm     Pulse: 61 66 60 60   Resp:  15 16 15   Temp: 98 6 °F (37 °C) 99 °F (37 2 °C)     TempSrc:  Oral     SpO2: 96%      Weight:       Height:            Temp:  [97 4 °F (36 3 °C)-99 °F (37 2 °C)] 99 °F (37 2 °C)  HR:  [59-87] 60  Resp:  [12-20] 15  BP: (109-163)/(54-99) 130/68  SpO2:  [93 %-100 %] 96 %     Body mass index is 29 29 kg/m²  Weight (last 2 days)     None          I/O last 3 completed shifts:   In: 620 [P O :120; I V :500]  Out: 1950 [Urine:500;  Other:1450]    I/O this shift:  In: 200 [I V :200]  Out: -       Physical exam:    General: no acute distress, cooperative  Eyes: conjunctivae pink, anicteric sclerae  ENT: lips and mucous membranes moist, no exudates, normal external ears  Neck: ROM intact, no JVD  Chest:  Rales on exam  CVS: normal rate, non pericardial friction rub  Abdomen: soft, non-tender, non-distended, normoactive bowel sounds  Extremities: no edema of both legs  Skin: no rash  Neuro: awake, alert, oriented, grossly intact  Psych:  Pleasant affect    Invasive Devices:      Lab Results:   Results from last 7 days   Lab Units 04/27/22  0840 04/26/22  0508 04/25/22  0450 04/24/22  2128 04/24/22  0448 04/23/22  0527 04/23/22  0524 04/22/22  1759 04/22/22  1759 04/22/22  0858 04/22/22  0858   WBC Thousand/uL 15 25* 10 67* 10 44*  --  10 32*  --  13 90*   < > 13 65*   < > 11 93*   HEMOGLOBIN g/dL 10 9* 9 4* 10 4*  --  9 2*  --  9 9*   < > 10 1*   < > 9 8*   HEMATOCRIT % 32 9* 29 0* 31 0*  --  26 9*  --  29 0*   < > 29 3*   < > 29 4*   PLATELETS Thousands/uL 333 323 360  --  331  --  366   < > 320   < > 350   POTASSIUM mmol/L  --  3 8 3 4* 3 9 2 7*  --  3 5   < > 3 7   < > 3 6   CHLORIDE mmol/L  --  100 98*  --  98*  --  97*  --  95*   < > 95*   CO2 mmol/L  --  30 27  --  24  --  23  --  22   < > 22   BUN mg/dL  --  113* 122*  --  125*  --  128*  --  136*   < > 136*   CREATININE mg/dL  --  5 54* 5 77*  --  5 77*  --  6 28*  --  6 51*   < > 6 76*   CALCIUM mg/dL  --  9 8 9 8  --  9 4  --  9 2  --  9 2   < > 9 0   MAGNESIUM mg/dL  --   --  2 3  --   --   --  2 4  --   --   --  2 3   PHOSPHORUS mg/dL  --   --  5 0*  --   --   --  5 9*  --   --   --   --    ALK PHOS U/L  --   --  100  --   --   --   --   --  129*  --  109   ALT U/L  --   --  20  --   --   --   --   --  25  --  22   AST U/L  --   --  15  --   --   --   --   --  16  --  15   LEUKOCYTES UA   --   --   --   --   --  Negative  --   --   -- --   --    BLOOD UA   --   --   --   --   --  Negative  --   --   --   --   --     < > = values in this interval not displayed  Portions of the record may have been created with voice recognition software  Occasional wrong word or "sound a like" substitutions may have occurred due to the inherent limitations of voice recognition software  Read the chart carefully and recognize, using context, where substitutions have occurred  If you have any questions, please contact the dictating provider

## 2022-04-27 NOTE — PLAN OF CARE
Started pt on HD tx with UF goal of 2L net as tolerated x 3 hours x 4K bath for serum k+ of 3 8 on 4/26/22  UF goal was not met because 1 5 hour into the tx, pt experienced cramps at his left ankle  UF was switched off  After few minutes, pt reported that cramping is getting worse  100ml saline bolus was given  It was resolved after and UF was never switched back on there after  Post-Dialysis RN Treatment Note    Blood Pressure:  Pre 147/78 mm/Hg  Post 142/70 mmHg   EDW  TBD kg    Weight:  Pre 83 8 kg   Post 82 7 kg   Mode of weight measurement: Standing Scale   Volume Removed  780 ml NET   Treatment duration 180 minutes    NS given  Yes, 100ml for cramps    Treatment shortened?  No   Medications given during Rx None Reported   Estimated Kt/V  1 19   Access type: Permacath/TDC   Access Issues: No    Report called to primary nurse   Yes, Jennifer Kent RN      Problem: METABOLIC, FLUID AND ELECTROLYTES - ADULT  Goal: Electrolytes maintained within normal limits  Description: INTERVENTIONS:  - Monitor labs and assess patient for signs and symptoms of electrolyte imbalances  - Administer electrolyte replacement as ordered  - Monitor response to electrolyte replacements, including repeat lab results as appropriate  - Instruct patient on fluid and nutrition as appropriate  Outcome: Progressing  Goal: Fluid balance maintained  Description: INTERVENTIONS:  - Monitor labs   - Monitor I/O and WT  - Instruct patient on fluid and nutrition as appropriate  - Assess for signs & symptoms of volume excess or deficit  Outcome: Progressing

## 2022-04-27 NOTE — DISCHARGE SUMMARY
[facility]  Patient: Job Le 74 y o  male   MRN: [id number]  PCP: Zakia Alejandro DO  Unit/Bed#: [facility] 905-01 Encounter: [id number]            Discharging Physician / Practitioner: Aston Raygoza MD  PCP: Zakia Alejandro DO  Admission Date:   Admission Orders (From admission, onward)     Ordered        04/22/22 1926  Inpatient Admission  Once                      Discharge Date: 04/27/22      Reason for Admission:  Worsening creatinine      Discharge Diagnoses:     Principal Problem:    ELZBIETA (acute kidney injury) on chronic kidney disease stage 4    Active Problems:    Insulin-dependent diabetes mellitus    Peripheral artery disease    Hyponatremia    Chronic diastolic CHF    NICOLASA (obstructive sleep apnea)    PAF (paroxysmal atrial fibrillation)    Essential hypertension    Anemia of chronic disease      Consultations During Hospital Stay:  · Nephrology  · [facility] Course:     See progress note for earlier today for full details  Patient has been assigned in outpatient dialysis chair time starting tomorrow at [facility]  Condition at Discharge: fair       Discharge Day Visit / Exam:     Vitals: Blood Pressure: 140/55 (04/27/22 1246)  Pulse: 63 (04/27/22 1250)  Temperature: (!) 97 4 °F (36 3 °C) (04/27/22 1140)  Temp Source: Oral (04/27/22 1140)  Respirations: 16 (04/27/22 1140)  Height: 5' 10" (177 8 cm) (04/23/22 1335)  Weight - Scale: 92 6 kg (204 lb 2 3 oz) (04/24/22 0550)  SpO2: 94 % (04/27/22 1246)      Physical exam - I had a face-to-face encounter with the patient on day of discharge  Discussion with Patient and/or Family:  The patient has been advised to return to the ER immediately if any symptoms recur or worsen  Discharge instructions/Information to Patient and/or Family:   See after visit summary for information provided to patient and/or family          Provisions for Follow-Up Care:  See after visit summary for information related to follow-up care and any pertinent home health orders  Disposition:   Home      Discharge Medications:  See after visit summary for reconciled discharge medications provided to patient and/or family  Discharge Statement:  I spent 38 minutes discharging the patient  This time was spent on the day of discharge  I had direct contact with the patient on the day of discharge  Greater than 50% of the total time was spent examining patient, answering all patient questions, arranging and discussing plan of care with patient as well as directly providing post-discharge instructions  Additional time then spent on discharge activities  ** Please Note: This note is constructed using a voice recognition dictation system  An occasional wrong word/phrase or sound-a-like substitution may have been picked up by dictation device due to the inherent limitations of voice recognition software  Read the chart carefully and recognize, using reasonable context, where substitutions may have occurred  **

## 2022-04-27 NOTE — PROGRESS NOTES
[facility]  Progress Note - Kee Yepez 1955, 74 y o  male MRN: [id number]  [facility] En[facility]: [id number]  Primary Care Provider: Indiana Ren DO   Date and time admitted to hospital: 4/22/2022  5:30 PM      * ELZBIETA (acute kidney injury) on chronic kidney disease stage 4  Assessment & Plan  Patient with history of CKD for awaiting AV fistula placement for future dialysis  -unfortunately over the past several weeks has had worsening creatinine now elevated up to 6  -patient has been taking metolazone twice a week , along with daily 40 mg torsemide in the morning and 20 mg torsemide in the p m, for lower extremity edema that has unfortunately been nonresponsive to treatment  -hold all nephrotoxins  -urinalysis - bland, protein creatinine ratio -2 33  -nephrology consult     4/23-appreciate urology recommendations, have initiated Lasix infusion and will monitor for improvement  Consider PermCath placement in 24-48 hours if does not improve with Lasix and supportive care  Nephrology note, patient has consented to dialysis if needed  4/25-unfortunately, no improvement creatinine; patient will require dialysis  NPO at midnight and PermCath to be placed tomorrow  Will start dialysis following that and will need chair time prior to be discharged the outpatient setting  4/26-underwent PermCath conversion today with continued dialysis afterwards - will require outpatient dialysis chair time to be set up prior to discharge  4/27-continues to tolerate dialysis  Discussed with nephrology today w/ plan to transition diuretic regimen to oral Torsemide 40 mg daily  Discharge planning once outpatient chair time has been established      Insulin-dependent diabetes mellitus  Assessment & Plan        Lab Results   Component Value Date     HGBA1C 6 0 (H) 02/18/2022     Continue basal insulin w/ additional SSI coverage per [employer]u-Cheks    Peripheral artery disease  Assessment & Plan  S/p multiple toe amputations  Continue statin     Hyponatremia  Assessment & Plan  In the setting of CHF  Management via hemodialysis - continue fluid restriction  Monitor serum sodium     Chronic diastolic CHF  Assessment & Plan  Fluid management now via hemodialysis   Previously on a Lasix drip then transitioned to twice daily IV Lasix dosing -> now transitioned to oral Demadex daily   Continue beta-blockade w/ Lopressor  Monitor and replete serum magnesium/potassium deficiencies if present  Continue fluid restriction  Echocardiogram earlier this month w/ an EF of 60% without evidence of regional LV wall motion abnormalities, however, noting moderate AR/MS, mild-moderate AS/MR, moderate-severe TR/pulmonary HTN and mild MT     NICOLASA (obstructive sleep apnea)  Assessment & Plan  On CPAP QHS     PAF (paroxysmal atrial fibrillation)  Assessment & Plan  Rate controlled on Lopressor  On Eliquis for anticoagulation  Biatrial dilatation noted on recent echocardiogram     Essential hypertension  Assessment & Plan  Continue Lopressor    Anemia of chronic disease  Assessment & Plan  Monitor H/H      DVT Prophylaxis:  Eliquis       Patient Centered Rounds:  I have performed bedside rounds and discussed plan of care with nursing today  Discussions with Specialists or Other Care Team Provider:  see above assessments if applicable    Education and Discussions with Family / Patient: Patient during dialysis today - discussed with wife, at bedside, yesterday who is aware of pending outpatient dialysis chair time    Time Spent for Care:  32 minutes  More than 50% of total time spent on counseling and coordination of care as described above  Current Length of Stay: 5 day(s)    Current Patient Status: Inpatient   Certification Statement:  Patient will continue to require additional hospital stay due to assessments as noted above      Code Status: Level 1 - Full Code        Subjective:     Seen/examined earlier today while undergoing dialysis  No new complaints this time  Does acknowledge a degree weakness/fatigue  Overall, he remains in pleasant and hopeful spirits  Objective:     Vitals:   Temp (24hrs), Av 2 °F (36 8 °C), Min:97 4 °F (36 3 °C), Max:99 °F (37 2 °C)    Temp:  [97 4 °F (36 3 °C)-99 °F (37 2 °C)] 97 4 °F (36 3 °C)  HR:  [60-66] 63  Resp:  [12-20] 16  BP: (109-147)/(49-99) 140/55  SpO2:  [94 %-97 %] 94 %  Body mass index is 29 29 kg/m²  Input and Output Summary (last 24 hours): Intake/Output Summary (Last 24 hours) at 2022 1254  Last data filed at 2022 1140  Gross per 24 hour   Intake 1020 ml   Output 2830 ml   Net -1810 ml       Physical Exam:     GENERAL:  Remains weak/fatigued - no acute distress  HEAD:  Normocephalic - atraumatic  EYES: PERRL - EOMI   MOUTH:  Mucosa moist  NECK:  Supple - full range of motion  CARDIAC:  Rate controlled - S1/S2 positive  PULMONARY:  Fairly clear to auscultation - nonlabored respirations  ABDOMEN:  Soft - nontender/nondistended - active bowel sounds  MUSCULOSKELETAL:  Motor strength/range of motion intact - trace distal LE edema  NEUROLOGIC:  Alert/oriented at baseline  SKIN:  Chronic wrinkles/blemishes   PSYCHIATRIC:  Mood/affect stable      Additional Data:     Labs & Recent Cultures:    Results from last 7 days   Lab Units 22  0840   WBC Thousand/uL 15 25*   HEMOGLOBIN g/dL 10 9*   HEMATOCRIT % 32 9*   PLATELETS Thousands/uL 333   NEUTROS PCT % 85*   LYMPHS PCT % 3*   MONOS PCT % 8   EOS PCT % 3     Results from last 7 days   Lab Units 22  0840 22  0508 22  0450   POTASSIUM mmol/L 3 4*   < > 3 4*   CHLORIDE mmol/L 99*   < > 98*   CO2 mmol/L 28   < > 27   BUN mg/dL 74*   < > 122*   CREATININE mg/dL 4 23*   < > 5 77*   CALCIUM mg/dL 8 9   < > 9 8   ALK PHOS U/L  --   --  100   ALT U/L  --   --  20   AST U/L  --   --  15    < > = values in this interval not displayed       Results from last 7 days   Lab Units 04/26/22  0508   INR  1 88*     Results from last 7 days   Lab Units 04/27/22  1248 04/27/22  0814 04/27/22  0730 04/26/22  2204 04/26/22  1719 04/26/22  1553 04/26/22  1430 04/26/22  0825 04/25/22  2119 04/25/22  1550 04/25/22  1105 04/25/22  0837   POC GLUCOSE mg/dl 159* 129 137 284* 239* 305* 213* 107 192* 221* 180* 162*                 Last 24 Hours Medication List:   Current Facility-Administered Medications   Medication Dose Route Frequency Provider Last Rate    apixaban  5 mg Oral BID Bro Duran DO      insulin glargine  15 Units Subcutaneous HS Siddhartha Bonilla MD      insulin lispro  1-5 Units Subcutaneous TID AC Bro Duran,       metoprolol tartrate  50 mg Oral Q12H [address] Bro Duran,       ondansetron  4 mg Intravenous Q6H PRN Bro Duran DO      pravastatin  40 mg Oral Daily With [employer], DO      sevelamer  800 mg Oral TID With Meals Jacquelin Jamison, DO      tamsulosin  0 4 mg Oral Daily With [employer], DO      torsemide  40 mg Oral Daily Estevand DO Tai                  ** Please Note: This note is constructed using a voice recognition dictation system  An occasional wrong word/phrase or sound-a-like substitution may have been picked up by dictation device due to the inherent limitations of voice recognition software  Read the chart carefully and recognize, using reasonable context, where substitutions may have occurred  **

## 2022-04-28 ENCOUNTER — TRANSITIONAL CARE MANAGEMENT (OUTPATIENT)
Dept: FAMILY MEDICINE CLINIC | Facility: CLINIC | Age: 67
End: 2022-04-28

## 2022-04-28 ENCOUNTER — OFFICE VISIT (OUTPATIENT)
Dept: HEMATOLOGY ONCOLOGY | Facility: CLINIC | Age: 67
End: 2022-04-28
Payer: MEDICARE

## 2022-04-28 VITALS
HEIGHT: 70 IN | DIASTOLIC BLOOD PRESSURE: 70 MMHG | WEIGHT: 182 LBS | SYSTOLIC BLOOD PRESSURE: 128 MMHG | OXYGEN SATURATION: 99 % | HEART RATE: 50 BPM | TEMPERATURE: 97.7 F | RESPIRATION RATE: 18 BRPM | BODY MASS INDEX: 26.05 KG/M2

## 2022-04-28 DIAGNOSIS — C18.7 MALIGNANT NEOPLASM OF SIGMOID COLON (HCC): Primary | ICD-10-CM

## 2022-04-28 DIAGNOSIS — C18.7 MALIGNANT NEOPLASM OF SIGMOID COLON (HCC): ICD-10-CM

## 2022-04-28 DIAGNOSIS — D72.828 OTHER ELEVATED WHITE BLOOD CELL (WBC) COUNT: ICD-10-CM

## 2022-04-28 DIAGNOSIS — N18.4 STAGE 4 CHRONIC KIDNEY DISEASE (HCC): ICD-10-CM

## 2022-04-28 DIAGNOSIS — T45.1X5A CHEMOTHERAPY-INDUCED NEUTROPENIA (HCC): Primary | ICD-10-CM

## 2022-04-28 DIAGNOSIS — D64.9 NORMOCYTIC ANEMIA: ICD-10-CM

## 2022-04-28 DIAGNOSIS — D70.1 CHEMOTHERAPY-INDUCED NEUTROPENIA (HCC): Primary | ICD-10-CM

## 2022-04-28 PROCEDURE — 99214 OFFICE O/P EST MOD 30 MIN: CPT | Performed by: NURSE PRACTITIONER

## 2022-04-28 RX ORDER — FLUOROURACIL 50 MG/ML
275 INJECTION, SOLUTION INTRAVENOUS ONCE
Status: CANCELLED | OUTPATIENT
Start: 2022-05-03

## 2022-04-28 RX ORDER — FLUOROURACIL 50 MG/ML
275 INJECTION, SOLUTION INTRAVENOUS ONCE
Status: CANCELLED | OUTPATIENT
Start: 2022-05-17

## 2022-04-28 RX ORDER — FLUOROURACIL 50 MG/ML
275 INJECTION, SOLUTION INTRAVENOUS ONCE
Status: CANCELLED | OUTPATIENT
Start: 2022-05-24

## 2022-04-28 RX ORDER — FLUOROURACIL 50 MG/ML
275 INJECTION, SOLUTION INTRAVENOUS ONCE
Status: CANCELLED | OUTPATIENT
Start: 2022-05-10

## 2022-04-28 NOTE — TELEPHONE ENCOUNTER
Yola Esteban  to Sriram Krause MD        4/28/22 5:56 AM  DR Deena Garzon and surgical scheduler, Ja Seo was recently in the hospital with an ELZBIETA, which resulted in him getting a perma-cath placement and starting on dialysis  He had 2 dialysis tx's in the hospital and then starts this Friday with 3x wk dialysis txs at [facility] in [place]  Mary Castillo has a fistula surgery scheduled with Dr Deena Garzon on Wed May 18th however, due to his dialysis schedule being every Mon, Wed and Fri at 6 and [facility] schedule currently being very full, we will need to reschedule this surgery  We are meeting with the oncologist today as Mary Castillo also needs to reschedule his every Monday chemo txs  We are very sorry about this surgical cancellation and will get back to you once chemo txs are scheduled   Thank you, Alessandro Han and Brandon Seo

## 2022-04-28 NOTE — PROGRESS NOTES
Hematology/Oncology Outpatient Follow-up  Carey Calderon 74 y o  male 1955 [id number]    Date:  4/28/2022      Assessment and Plan:  1  Malignant neoplasm of sigmoid colon [facility])  Patient reports that he is feeling much better since he started hemodialysis which he will continue on a Monday Wednesday Friday schedule locally  He will eventually be getting dialysis fistula  He feels ready to resume his chemotherapy treatment at this time but will need to change his oncology appointments to Tuesdays/Thursdays to coordinate with his dialysis schedule  He will continue his current treatment with dose adjusted 5 FU/Leucovorin IV push weekly 5 weeks on with 1 week of a break; will be receiving cycle 2 day 8 next Tuesday 05/03/2022  (He was started on 50% of the usual dose and was increased further by 10% at the start of cycle 2 @ 275mg/M2; will continue to monitor him and see if we can increase slowly going forward)  Will continue to check his laboratory studies weekly before each treatment  Request she follow up again for close monitoring in 2 weeks  Will likely aim to do repeat imaging after completing about 3 cycles of chemotherapy  - CBC and differential; Standing  - Comprehensive metabolic panel; Standing  - Magnesium; Standing  - CEA; Future  - CBC and differential  - Comprehensive metabolic panel  - Magnesium    2  Normocytic anemia  Patient continues to have stable normocytic anemia which is multifactorial including anemia of chronic renal dysfunction, anemia of chronic disease and treatment related  He did receive a dose of IV Venofer recently  3  Other elevated white blood cell (WBC) count  Patient continues to have leukocytosis which seems to be a chronic process  He denies any symptoms of infection and is afebrile  Is a former smoker    Likely due to chronic inflammation versus COPD        4  Stage 4 chronic kidney disease (HCC)    HPI:  Patient presents today for a follow-up visit accompanied by his wife  He had to be admitted to the hospital recently 4/22 through 4/27 after he was found to have significant worsening renal dysfunction  He did have dialysis catheter placed and started hemodialysis while he was inpatient which he will continue on a Monday Wednesday Friday schedule  Since he started the dialysis about 20 lb of fluid has been removed; his legs are no longer swollen  He does overall feel better after starting the dialysis with some improvement of his fatigue  His most recent laboratory studies from day of hospital discharge 04/27/2022 showed again leukocytosis WBC 15 25, he has stable if not slightly improved normocytic anemia H&H 10 9/32 9, MCV 94 with normal platelet count 225  Creatinine 4 23, GFR 13, glucose 149  Oncology History Overview Note   Patient with multiple comorbid conditions including CHF, advanced chronic kidney disease, diabetes mellitus, arthritis, sleep apnea, etc   He apparently had multiple colonoscopies 2021  He was found to have adenocarcinoma in the sigmoid region rising from tubular adenoma on 01/08/2021  He had a PET-CT scan on 02/19/2021 which showed uptake in the sigmoid colon otherwise no finding for hypermetabolic metastasis was found  The patient had 2 other colonoscopies and finally had his laparoscopic surgery on 10/21/2021 which showed showed residual adenocarcinoma moderately differentiated arising in the tubular adenoma with high-grade dysplasia, pT3 with 3/16 lymph node involvement  All margins were negative without obvious lymphovascular or perineural invasion  The patient subsequently on 11/24/2021 had a CT scan of the chest abdomen pelvis and IV contrast was given by mistake which show resulted in significant worsening of his kidney function with creatinine around 7  The he required hospitalization  His baseline creatinine level is around 3      The CT scan on 11/24/2021 showed 1 new cm nodule ground-glass density in the superior segment of the right lower lobe which was thought to be inflammatory versus neoplastic  There are 2 areas of heterogeneous attenuation also in the abdominal area of unknown significance  Follow-up in 3 months was suggested  PET/CT 1/4/22: IMPRESSION:  1   1 0 cm groundglass right lower lobe lung nodules seen on CT of chest dated 11/24/2021 is not definitively visualized on the current exam and likely has resolved although evaluation of the lung fields is limited secondary to respiratory motion   artifact  There is no focal FDG activity in the chest characteristic of hypermetabolic malignancy  Given limitations, short interval follow-up with CT of chest in 3 months is recommended to document resolution  2   Multifocal peritoneal hypermetabolic soft tissue foci involving the left mid abdomen and extending along the left paracolic gutter to the central lower abdomen/pelvis, most of which appears to be associated with internal attenuation and is most   suggestive of fat necrosis with underlying peritoneal carcinomatosis not excluded, particularly in the anterior left mid abdomen  As recommended previously, short interval follow-up with imaging in 3 months is recommended to ensure stability and exclude   developing peritoneal carcinomatosis  3   Moderate bilateral pleural effusions  4   Stable in size minimally FDG avid mediastinal/right hilar/left inguinal lymph nodes of uncertain clinical significance but most suggestive of a benign inflammatory process  Attention to these regions on follow-up scans is recommended  5   Air-fluid levels within the bilateral maxillary sinuses for which correlation is recommended to exclude acute sinusitis  He was started on the FOLFOX regimen without the oxaliplatin 1/2022  He was educated he will be educated extensively about the chemotherapy and the potential side effects including renal failure/hemodialysis risk    Patient did not tolerate the 1st cycle of chemotherapy well at all which resulted in hospitalization lesion and need for dialysis x2 treatments  He sought 2nd opinion in network with 1 of our colleagues Sheila [id number]  Decision was made to trial 5 FU/Leucovorin at 50% of the usual dose (250mg/m2) via IV push weekly 5 weeks on with 1 week of a break which can be adjusted/titrated slowly according to patient's tolerance        Colon cancer ([facility] [address] )   10/23/2021 Initial Diagnosis    Colon cancer ([facility] [address] )     1/18/2022 - 1/20/2022 Chemotherapy    fluorouracil (ADRUCIL), 400 mg/m2 = 830 mg, Intravenous, Once, 1 of 1 cycle  Administration: 830 mg (1/18/2022)  leucovorin calcium IVPB, 828 mg, Intravenous, Once, 1 of 12 cycles  Administration: 800 mg (1/18/2022)  fluorouracil (ADRUCIL) ambulatory infusion Soln, 1,200 mg/m2/day = 4,970 mg, Intravenous, Over 46 hours, 1 of 12 cycles  Dose modification: 800 mg/m2/day (original dose 1,200 mg/m2/day, Cycle 2, Reason: Other (Must fill in a comment), Comment: Dr young )     3/7/2022 -  Chemotherapy    fluorouracil (ADRUCIL), 515 mg (62 5 % of original dose 400 mg/m2), Intravenous, Once, 2 of 12 cycles  Dose modification: 250 mg/m2 (original dose 400 mg/m2, Cycle 1, Reason: Dose modified as per discussion with consulting physician), 275 mg/m2 (original dose 400 mg/m2, Cycle 2, Reason: Max Dose Reached)  Administration: 500 mg (3/7/2022), 500 mg (3/14/2022), 500 mg (3/21/2022), 500 mg (3/28/2022), 500 mg (4/4/2022), 565 mg (4/18/2022)  leucovorin calcium IVPB, 515 mg (62 5 % of original dose 400 mg/m2), Intravenous, Once, 2 of 12 cycles  Dose modification: 250 mg/m2 (original dose 400 mg/m2, Cycle 1, Reason: Dose modified as per discussion with consulting physician), 275 mg/m2 (original dose 400 mg/m2, Cycle 2, Reason: Max Dose Reached)  Administration: 500 mg (3/7/2022), 500 mg (3/14/2022), 500 mg (3/21/2022), 500 mg (3/28/2022), 500 mg (4/4/2022), 550 mg (4/18/2022)         Interval history:    ROS: Review of Systems Constitutional: Positive for fatigue (improved)  Negative for activity change, appetite change, chills, fever and unexpected weight change  HENT: Negative for congestion, hearing loss, mouth sores, nosebleeds, sore throat and trouble swallowing  Eyes: Negative  Respiratory: Positive for shortness of breath  Negative for cough and chest tightness  Cardiovascular: Positive for leg swelling ( significantly improved)  Negative for chest pain and palpitations  Gastrointestinal: Positive for constipation  Negative for abdominal distention, abdominal pain, blood in stool, diarrhea, nausea and vomiting  Genitourinary: Negative for difficulty urinating, dysuria, frequency, hematuria and urgency  Musculoskeletal: Positive for arthralgias and myalgias  Negative for back pain, gait problem and joint swelling  Skin: Positive for color change, rash and wound (chronic skin changes no change from baseline)  Negative for pallor  Neurological: Negative for dizziness, weakness, light-headedness, numbness and headaches  Hematological: Negative for adenopathy  Does not bruise/bleed easily  Psychiatric/Behavioral: Positive for dysphoric mood  Negative for sleep disturbance         Past Medical History:   Diagnosis Date    Anemia     iron def    Arthritis     OA    Bruise of both arms     forearms and both hands    Bruises easily     Cancer ([facility] [place] [address] [facility] 09/01/2021    colon    CHF (congestive heart failure) (HCC)     Chronic kidney disease     CPAP (continuous positive airway pressure) dependence     Diabetes mellitus ([facility] [address] )     Diabetic foot ulcer ([facility] [address] )     Eczema     Erectile dysfunction     Gout     Heart murmur     History of permanent cardiac pacemaker placement 11/2018    Hyperlipidemia     Hypertension     Hypoglycemia 3/8/2019    Murmur     Nephropathy     Osteoarthritis     Pacemaker 11/06/2018    PAD (peripheral artery disease) (HCC)     Seasonal allergies     Sleep apnea     Toe infection     bilat great toes    Vertigo     Walks frequently     Wears dentures     upper    Wears glasses        Past Surgical History:   Procedure Laterality Date    CARDIAC PACEMAKER PLACEMENT      CARPAL TUNNEL RELEASE Left     CARPAL TUNNEL RELEASE Right     CARPAL TUNNEL RELEASE      COLONOSCOPY  08/2020    COLONOSCOPY      FL GUIDED CENTRAL VENOUS ACCESS DEVICE INSERTION  1/11/2022    FOOT AMPUTATION Left 2/10/2020    Procedure: PARTIAL 1ST RAY AMPUTATION;  Surgeon: Nely Singer DPM;  [facility];  Service: Podiatry    INCISION AND DRAINAGE OF WOUND Left 1/12/2020    Procedure: INCISION AND DRAINAGE (I&D) EXTREMITY AND REMOVAL OF SESMOID BONE;  Surgeon: Tyler Cuevas DPM;  [facility];  Service: Podiatry    IR OTHER  1/21/2022    IR PICC REPOSITION  1/14/2020    IR TUNNELED DIALYSIS CATHETER PLACEMENT  4/26/2022    KNEE ARTHROSCOPY Left     KNEE ARTHROSCOPY Right     KNEE SURGERY      AZ AMPUTATION TOE,I-P JT Bilateral 5/2/2017    Procedure: PARTIAL AMPUTATION RIGHT AND LEFT HALLUX ;  Surgeon: Nely Singer DPM;  [facility];  Service: Podiatry    AZ AMPUTATION TOE,MT-P JT Left 7/25/2017    Procedure: 2ND TOE AMPUTATION;  Surgeon: Nely Singer DPM;  [facility];  Service: Podiatry    AZ INSJ TUNNELED CTR VAD W/SUBQ PORT AGE 5 YR/> N/A 1/11/2022    Procedure: INSERTION VENOUS PORT ( PORT-A-CATH) IR;  Surgeon: Yusra Castaneda DO;  [facility];  Service: Interventional Radiology    RESECTION LOW ANTERIOR LAPAROSCOPIC N/A 10/21/2021    Procedure: RESECTION LOW ANTERIOR LAPAROSCOPIC;  Surgeon: David Ruiz MD;  [facility];  Service: Colorectal    SHOULDER ARTHROSCOPY Left     with screws,RTC    SHOULDER SURGERY      TOE AMPUTATION Left 1/8/2020    Procedure: HALLUX AMPUTATION;  Surgeon: Tyler Cuevas DPM;  [facility];  Service: Podiatry    UPPER GASTROINTESTINAL ENDOSCOPY  03/2020    Intestinal metaplasia prepyloric    VASECTOMY         Social History     Socioeconomic History    Marital status: /Civil Union     Spouse name: None    Number of children: None    Years of education: None    Highest education level: None   Occupational History    None   Tobacco Use    Smoking status: Former Smoker     Packs/day: 0 50     Years: 20 00     Pack years: 10 00     Types: Cigarettes     Start date: 1     Quit date:      Years since quittin 3    Smokeless tobacco: Never Used    Tobacco comment: quit 10 years ago   Vaping Use    Vaping Use: Never used   Substance and Sexual Activity    Alcohol use: Never     Alcohol/week: 0 0 standard drinks    Drug use: No    Sexual activity: Not Currently     Partners: Female   Other Topics Concern    None   Social History Narrative    ** Merged History Encounter **         Daily cola consumption (2 cans/day)     Social Determinants of Health     Financial Resource Strain: Not on file   Food Insecurity: No Food Insecurity    Worried About Running Out of Food in the Last Year: Never true    Joel of Food in the Last Year: Never true   Transportation Needs: No Transportation Needs    Lack of Transportation (Medical): No    Lack of Transportation (Non-Medical):  No   Physical Activity: Not on file   Stress: Not on file   Social Connections: Not on file   Intimate Partner Violence: Not on file   Housing Stability: Low Risk     Unable to Pay for Housing in the Last Year: No    Number of Places Lived in the Last Year: 1    Unstable Housing in the Last Year: No       Family History   Problem Relation Age of Onset    Heart disease Father         passed away    Hypertension Father     Pulmonary embolism Father     Hypertension Mother         assed away       Allergies   Allergen Reactions    Invokana [Canagliflozin] Other (See Comments)     Caused circulation problems    Lamisil [Terbinafine] Blisters     Wife states " His skin peeled from head to toe"    Other Swelling     Pomegranate - facial swelling, no swelling of tongue, esophagus  Adhesive tape   Latex Rash         Current Outpatient Medications:     allopurinol (ZYLOPRIM) 300 mg tablet, Take 1 tablet (300 mg total) by mouth every morning, Disp: 90 tablet, Rfl: 0    amLODIPine (NORVASC) 10 mg tablet, Take 1 tablet (10 mg total) by mouth daily, Disp: 90 tablet, Rfl: 3    apixaban (ELIQUIS) 5 mg, Take 1 tablet (5 mg total) by mouth every 12 (twelve) hours, Disp: 180 tablet, Rfl: 3    Dupilumab (Dupixent) 300 MG/2ML SOPN, Inject 300 mg under the skin Once every 2 weeks, Disp: , Rfl:     famotidine (PEPCID) 40 MG tablet, TAKE 1 TABLET BY MOUTH EVERY DAY, Disp: 90 tablet, Rfl: 3    Insulin Degludec-Liraglutide (Xultophy) 100 units-3 6 mg/mL injection pen, Inject 19 units daily  , Disp: 18 mL, Rfl: 1    Insulin Pen Needle (BD Pen Needle Zenaida U/F) 32G X 4 MM MISC, Use 1 daily, Disp: 100 each, Rfl: 2    metoprolol tartrate (LOPRESSOR) 50 mg tablet, Take 1 tablet (50 mg total) by mouth every 12 (twelve) hours, Disp: 180 tablet, Rfl: 3    Multiple Vitamin (MULTIVITAMIN) tablet, Take 1 tablet by mouth daily IN AM, Disp: , Rfl:     omega-3-acid ethyl esters (LOVAZA) 1 g capsule, Take 2 capsules (2 g total) by mouth 2 (two) times a day Mail print prescription to pt, Disp: 360 capsule, Rfl: 3    ondansetron (ZOFRAN) 8 mg tablet, Take 1 tablet (8 mg total) by mouth every 8 (eight) hours as needed for nausea or vomiting, Disp: 20 tablet, Rfl: 3    OneTouch Ultra test strip, USE TO TEST BLOOD SUGAR 3 TIMES A DAY, Disp: 100 each, Rfl: 3    potassium chloride (K-DUR,KLOR-CON) 10 mEq tablet, Take 2 tablets (20 mEq total) by mouth daily, Disp: 60 tablet, Rfl: 3    sevelamer (RENAGEL) 800 mg tablet, Take 1 tablet (800 mg total) by mouth 3 (three) times a day with meals, Disp: 90 tablet, Rfl: 0    simvastatin (ZOCOR) 20 mg tablet, Take 1 tablet (20 mg total) by mouth daily at bedtime, Disp: 90 tablet, Rfl: 3   sodium bicarbonate 650 mg tablet, Take 1 tablet (650 mg total) by mouth 2 (two) times daily after meals, Disp: 180 tablet, Rfl: 3    tamsulosin (FLOMAX) 0 4 mg, TAKE 1 CAPSULE BY MOUTH EVERY DAY WITH DINNER, Disp: 90 capsule, Rfl: 3    torsemide (DEMADEX) 20 mg tablet, Take 2 tablets (40 mg total) by mouth 2 (two) times a day 40mg am and 20mg pm, Disp: 270 tablet, Rfl: 3  No current facility-administered medications for this visit  Physical Exam:  /70 (BP Location: Left arm, Patient Position: Sitting, Cuff Size: Adult)   Pulse (!) 50   Temp 97 7 °F (36 5 °C)   Resp 18   Ht 5' 10" (1 778 m)   Wt 82 6 kg (182 lb)   SpO2 99%   BMI 26 11 kg/m²     Physical Exam  Vitals reviewed  Constitutional:       General: He is not in acute distress  Appearance: He is well-developed  He is not diaphoretic  HENT:      Head: Normocephalic and atraumatic  Eyes:      General: Lids are normal  No scleral icterus  Conjunctiva/sclera: Conjunctivae normal       Pupils: Pupils are equal, round, and reactive to light  Neck:      Thyroid: No thyromegaly  Cardiovascular:      Rate and Rhythm: Normal rate and regular rhythm  Heart sounds: Murmur heard  Systolic murmur is present  Comments: Dialysis catheter right chest wall- dressing intact no erythema warmth or drainage noted around site  Pulmonary:      Effort: Pulmonary effort is normal  No respiratory distress  Breath sounds: Normal breath sounds  Chest:   Breasts:      Right: No axillary adenopathy  Left: No axillary adenopathy  Abdominal:      General: There is no distension  Palpations: Abdomen is soft  There is no hepatomegaly or splenomegaly  Tenderness: There is no abdominal tenderness  Musculoskeletal:         General: No swelling  Normal range of motion  Cervical back: Normal range of motion and neck supple  Right lower leg: Edema (trace) present  Left lower leg: Edema (trace) present  Comments: Ortho shoe left foot   Lymphadenopathy:      Cervical: No cervical adenopathy  Upper Body:      Right upper body: No axillary adenopathy  Left upper body: No axillary adenopathy  Skin:     General: Skin is warm and dry  Coloration: Skin is pale  Findings: No erythema or rash  Neurological:      General: No focal deficit present  Mental Status: He is alert and oriented to person, place, and time  Psychiatric:         Mood and Affect: Mood normal  Mood is not depressed  Affect is blunt  Behavior: Behavior normal  Behavior is cooperative  Thought Content: Thought content normal          Judgment: Judgment normal            Labs:  Lab Results   Component Value Date    WBC 15 25 (H) 04/27/2022    HGB 10 9 (L) 04/27/2022    HCT 32 9 (L) 04/27/2022    MCV 94 04/27/2022     04/27/2022     Lab Results   Component Value Date     (L) 04/10/2017    K 3 4 (L) 04/27/2022    CL 99 (L) 04/27/2022    CO2 28 04/27/2022    BUN 74 (H) 04/27/2022    CREATININE 4 23 (H) 04/27/2022    GLUCOSE 142 (H) 11/01/2018    GLUF 140 (H) 04/22/2022    CALCIUM 8 9 04/27/2022    CORRECTEDCA 10 4 (H) 04/25/2022    AST 15 04/25/2022    ALT 20 04/25/2022    ALKPHOS 100 04/25/2022    PROT 6 9 04/10/2017    BILITOT 0 7 04/10/2017    EGFR 13 04/27/2022       Patient voiced understanding and agreement in the above discussion  Aware to contact our office with questions/symptoms in the interim  This note has been generated by voice recognition software system  Therefore, there may be spelling, grammar, and or syntax errors  Please contact if questions arise

## 2022-04-28 NOTE — TELEPHONE ENCOUNTER
Pt's cardiology apt for 4/28 has been cx and r/s to 6/10 w/ Dr Johana Linares  Pt is also having echo 6/8/22

## 2022-04-28 NOTE — TELEPHONE ENCOUNTER
Sw pt's wife Norman Herrmann and they are on their way to the oncologist now  She said she will call me when she has a better idea of when everything is going to be re-arranged for and when he can then have a break in his chemo to have his surgery with Dr Eddi Mccormick

## 2022-05-02 ENCOUNTER — APPOINTMENT (OUTPATIENT)
Dept: LAB | Facility: CLINIC | Age: 67
End: 2022-05-02
Payer: MEDICARE

## 2022-05-02 ENCOUNTER — HOSPITAL ENCOUNTER (OUTPATIENT)
Dept: INFUSION CENTER | Facility: HOSPITAL | Age: 67
Discharge: HOME | End: 2022-05-02
Attending: INTERNAL MEDICINE

## 2022-05-02 DIAGNOSIS — N18.4 STAGE 4 CHRONIC KIDNEY DISEASE (HCC): ICD-10-CM

## 2022-05-02 DIAGNOSIS — E87.6 HYPOKALEMIA: ICD-10-CM

## 2022-05-02 DIAGNOSIS — C18.7 MALIGNANT NEOPLASM OF SIGMOID COLON (HCC): ICD-10-CM

## 2022-05-02 LAB
ALBUMIN SERPL BCP-MCNC: 3.2 G/DL (ref 3.5–5)
ALP SERPL-CCNC: 118 U/L (ref 46–116)
ALT SERPL W P-5'-P-CCNC: 31 U/L (ref 12–78)
ANION GAP SERPL CALCULATED.3IONS-SCNC: 8 MMOL/L (ref 4–13)
AST SERPL W P-5'-P-CCNC: 36 U/L (ref 5–45)
BASOPHILS # BLD AUTO: 0.08 THOUSANDS/ΜL (ref 0–0.1)
BASOPHILS NFR BLD AUTO: 1 % (ref 0–1)
BILIRUB SERPL-MCNC: 0.88 MG/DL (ref 0.2–1)
BUN SERPL-MCNC: 44 MG/DL (ref 5–25)
CALCIUM ALBUM COR SERPL-MCNC: 10.3 MG/DL (ref 8.3–10.1)
CALCIUM SERPL-MCNC: 9.7 MG/DL (ref 8.3–10.1)
CHLORIDE SERPL-SCNC: 102 MMOL/L (ref 100–108)
CO2 SERPL-SCNC: 25 MMOL/L (ref 21–32)
CREAT SERPL-MCNC: 3.52 MG/DL (ref 0.6–1.3)
EOSINOPHIL # BLD AUTO: 0.51 THOUSAND/ΜL (ref 0–0.61)
EOSINOPHIL NFR BLD AUTO: 4 % (ref 0–6)
ERYTHROCYTE [DISTWIDTH] IN BLOOD BY AUTOMATED COUNT: 15.7 % (ref 11.6–15.1)
GFR SERPL CREATININE-BSD FRML MDRD: 16 ML/MIN/1.73SQ M
GLUCOSE P FAST SERPL-MCNC: 129 MG/DL (ref 65–99)
HCT VFR BLD AUTO: 33.7 % (ref 36.5–49.3)
HGB BLD-MCNC: 11.4 G/DL (ref 12–17)
IMM GRANULOCYTES # BLD AUTO: 0.1 THOUSAND/UL (ref 0–0.2)
IMM GRANULOCYTES NFR BLD AUTO: 1 % (ref 0–2)
LYMPHOCYTES # BLD AUTO: 1.08 THOUSANDS/ΜL (ref 0.6–4.47)
LYMPHOCYTES NFR BLD AUTO: 9 % (ref 14–44)
MAGNESIUM SERPL-MCNC: 1.9 MG/DL (ref 1.6–2.6)
MCH RBC QN AUTO: 31.6 PG (ref 26.8–34.3)
MCHC RBC AUTO-ENTMCNC: 33.8 G/DL (ref 31.4–37.4)
MCV RBC AUTO: 93 FL (ref 82–98)
MONOCYTES # BLD AUTO: 1.11 THOUSAND/ΜL (ref 0.17–1.22)
MONOCYTES NFR BLD AUTO: 9 % (ref 4–12)
NEUTROPHILS # BLD AUTO: 9.6 THOUSANDS/ΜL (ref 1.85–7.62)
NEUTS SEG NFR BLD AUTO: 76 % (ref 43–75)
NRBC BLD AUTO-RTO: 0 /100 WBCS
PLATELET # BLD AUTO: 260 THOUSANDS/UL (ref 149–390)
PMV BLD AUTO: 10.2 FL (ref 8.9–12.7)
POTASSIUM SERPL-SCNC: 4 MMOL/L (ref 3.5–5.3)
PROT SERPL-MCNC: 7.4 G/DL (ref 6.4–8.2)
RBC # BLD AUTO: 3.61 MILLION/UL (ref 3.88–5.62)
SODIUM SERPL-SCNC: 135 MMOL/L (ref 136–145)
WBC # BLD AUTO: 12.48 THOUSAND/UL (ref 4.31–10.16)

## 2022-05-02 PROCEDURE — 83735 ASSAY OF MAGNESIUM: CPT | Performed by: NURSE PRACTITIONER

## 2022-05-02 PROCEDURE — 36415 COLL VENOUS BLD VENIPUNCTURE: CPT | Performed by: NURSE PRACTITIONER

## 2022-05-02 PROCEDURE — 85025 COMPLETE CBC W/AUTO DIFF WBC: CPT | Performed by: NURSE PRACTITIONER

## 2022-05-02 PROCEDURE — 80053 COMPREHEN METABOLIC PANEL: CPT | Performed by: NURSE PRACTITIONER

## 2022-05-03 ENCOUNTER — HOSPITAL ENCOUNTER (OUTPATIENT)
Dept: INFUSION CENTER | Facility: HOSPITAL | Age: 67
Discharge: HOME | End: 2022-05-03
Attending: INTERNAL MEDICINE
Payer: MEDICARE

## 2022-05-03 VITALS
TEMPERATURE: 97.3 F | BODY MASS INDEX: 26.57 KG/M2 | WEIGHT: 185.63 LBS | HEART RATE: 88 BPM | DIASTOLIC BLOOD PRESSURE: 66 MMHG | SYSTOLIC BLOOD PRESSURE: 124 MMHG | HEIGHT: 70 IN | RESPIRATION RATE: 18 BRPM | OXYGEN SATURATION: 95 %

## 2022-05-03 DIAGNOSIS — T45.1X5A CHEMOTHERAPY-INDUCED NEUTROPENIA (HCC): ICD-10-CM

## 2022-05-03 DIAGNOSIS — D70.1 CHEMOTHERAPY-INDUCED NEUTROPENIA (HCC): ICD-10-CM

## 2022-05-03 DIAGNOSIS — C18.7 MALIGNANT NEOPLASM OF SIGMOID COLON (HCC): Primary | ICD-10-CM

## 2022-05-03 PROCEDURE — 96409 CHEMO IV PUSH SNGL DRUG: CPT

## 2022-05-03 PROCEDURE — 96367 TX/PROPH/DG ADDL SEQ IV INF: CPT

## 2022-05-03 PROCEDURE — 96366 THER/PROPH/DIAG IV INF ADDON: CPT

## 2022-05-03 RX ORDER — SODIUM CHLORIDE 9 MG/ML
20 INJECTION, SOLUTION INTRAVENOUS ONCE AS NEEDED
Status: DISCONTINUED | OUTPATIENT
Start: 2022-05-03 | End: 2022-05-07 | Stop reason: HOSPADM

## 2022-05-03 RX ORDER — DEXTROSE MONOHYDRATE 50 MG/ML
20 INJECTION, SOLUTION INTRAVENOUS ONCE
Status: DISCONTINUED | OUTPATIENT
Start: 2022-05-03 | End: 2022-05-07 | Stop reason: HOSPADM

## 2022-05-03 RX ORDER — FLUOROURACIL 50 MG/ML
275 INJECTION, SOLUTION INTRAVENOUS ONCE
Status: COMPLETED | OUTPATIENT
Start: 2022-05-03 | End: 2022-05-03

## 2022-05-03 RX ADMIN — SODIUM CHLORIDE 20 ML/HR: 0.9 INJECTION, SOLUTION INTRAVENOUS at 10:25

## 2022-05-03 RX ADMIN — FLUOROURACIL 555 MG: 50 INJECTION, SOLUTION INTRAVENOUS at 13:23

## 2022-05-03 RX ADMIN — DEXAMETHASONE SODIUM PHOSPHATE: 10 INJECTION, SOLUTION INTRAMUSCULAR; INTRAVENOUS at 10:29

## 2022-05-03 RX ADMIN — LEUCOVORIN CALCIUM 550 MG: 500 INJECTION, POWDER, LYOPHILIZED, FOR SOLUTION INTRAMUSCULAR; INTRAVENOUS at 11:04

## 2022-05-09 ENCOUNTER — APPOINTMENT (OUTPATIENT)
Dept: LAB | Facility: CLINIC | Age: 67
End: 2022-05-09
Payer: MEDICARE

## 2022-05-09 DIAGNOSIS — N18.9 ACUTE KIDNEY INJURY SUPERIMPOSED ON CHRONIC KIDNEY DISEASE (HCC): ICD-10-CM

## 2022-05-09 DIAGNOSIS — C18.7 MALIGNANT NEOPLASM OF SIGMOID COLON (HCC): ICD-10-CM

## 2022-05-09 DIAGNOSIS — N17.9 ACUTE KIDNEY INJURY SUPERIMPOSED ON CHRONIC KIDNEY DISEASE (HCC): ICD-10-CM

## 2022-05-09 LAB
ALBUMIN SERPL BCP-MCNC: 3 G/DL (ref 3.5–5)
ALP SERPL-CCNC: 133 U/L (ref 46–116)
ALT SERPL W P-5'-P-CCNC: 49 U/L (ref 12–78)
ANION GAP SERPL CALCULATED.3IONS-SCNC: 5 MMOL/L (ref 4–13)
AST SERPL W P-5'-P-CCNC: 42 U/L (ref 5–45)
BASOPHILS # BLD AUTO: 0.04 THOUSANDS/ΜL (ref 0–0.1)
BASOPHILS NFR BLD AUTO: 1 % (ref 0–1)
BILIRUB SERPL-MCNC: 0.78 MG/DL (ref 0.2–1)
BUN SERPL-MCNC: 58 MG/DL (ref 5–25)
CALCIUM ALBUM COR SERPL-MCNC: 10.1 MG/DL (ref 8.3–10.1)
CALCIUM SERPL-MCNC: 9.3 MG/DL (ref 8.3–10.1)
CEA SERPL-MCNC: 1.1 NG/ML (ref 0–3)
CHLORIDE SERPL-SCNC: 102 MMOL/L (ref 100–108)
CO2 SERPL-SCNC: 27 MMOL/L (ref 21–32)
CREAT SERPL-MCNC: 2.94 MG/DL (ref 0.6–1.3)
EOSINOPHIL # BLD AUTO: 0.52 THOUSAND/ΜL (ref 0–0.61)
EOSINOPHIL NFR BLD AUTO: 6 % (ref 0–6)
ERYTHROCYTE [DISTWIDTH] IN BLOOD BY AUTOMATED COUNT: 16.1 % (ref 11.6–15.1)
GFR SERPL CREATININE-BSD FRML MDRD: 20 ML/MIN/1.73SQ M
GLUCOSE P FAST SERPL-MCNC: 99 MG/DL (ref 65–99)
HCT VFR BLD AUTO: 34 % (ref 36.5–49.3)
HGB BLD-MCNC: 11.4 G/DL (ref 12–17)
IMM GRANULOCYTES # BLD AUTO: 0.16 THOUSAND/UL (ref 0–0.2)
IMM GRANULOCYTES NFR BLD AUTO: 2 % (ref 0–2)
LYMPHOCYTES # BLD AUTO: 1.05 THOUSANDS/ΜL (ref 0.6–4.47)
LYMPHOCYTES NFR BLD AUTO: 12 % (ref 14–44)
MAGNESIUM SERPL-MCNC: 1.8 MG/DL (ref 1.6–2.6)
MCH RBC QN AUTO: 31.4 PG (ref 26.8–34.3)
MCHC RBC AUTO-ENTMCNC: 33.5 G/DL (ref 31.4–37.4)
MCV RBC AUTO: 94 FL (ref 82–98)
MONOCYTES # BLD AUTO: 0.89 THOUSAND/ΜL (ref 0.17–1.22)
MONOCYTES NFR BLD AUTO: 10 % (ref 4–12)
NEUTROPHILS # BLD AUTO: 6.12 THOUSANDS/ΜL (ref 1.85–7.62)
NEUTS SEG NFR BLD AUTO: 69 % (ref 43–75)
NRBC BLD AUTO-RTO: 0 /100 WBCS
PLATELET # BLD AUTO: 201 THOUSANDS/UL (ref 149–390)
PMV BLD AUTO: 9.8 FL (ref 8.9–12.7)
POTASSIUM SERPL-SCNC: 4.2 MMOL/L (ref 3.5–5.3)
PROT SERPL-MCNC: 7.2 G/DL (ref 6.4–8.2)
RBC # BLD AUTO: 3.63 MILLION/UL (ref 3.88–5.62)
SODIUM SERPL-SCNC: 134 MMOL/L (ref 136–145)
WBC # BLD AUTO: 8.78 THOUSAND/UL (ref 4.31–10.16)

## 2022-05-09 PROCEDURE — 36415 COLL VENOUS BLD VENIPUNCTURE: CPT

## 2022-05-09 PROCEDURE — 80053 COMPREHEN METABOLIC PANEL: CPT

## 2022-05-09 PROCEDURE — 83735 ASSAY OF MAGNESIUM: CPT

## 2022-05-09 PROCEDURE — 82378 CARCINOEMBRYONIC ANTIGEN: CPT

## 2022-05-09 PROCEDURE — 85025 COMPLETE CBC W/AUTO DIFF WBC: CPT

## 2022-05-09 RX ORDER — SEVELAMER HYDROCHLORIDE 800 MG/1
800 TABLET, FILM COATED ORAL
Qty: 270 TABLET | Refills: 3 | Status: SHIPPED | OUTPATIENT
Start: 2022-05-09 | End: 2022-05-12 | Stop reason: SDUPTHER

## 2022-05-10 ENCOUNTER — HOSPITAL ENCOUNTER (OUTPATIENT)
Dept: INFUSION CENTER | Facility: HOSPITAL | Age: 67
Discharge: HOME | End: 2022-05-10
Attending: INTERNAL MEDICINE
Payer: MEDICARE

## 2022-05-10 VITALS
HEIGHT: 70 IN | BODY MASS INDEX: 26.61 KG/M2 | DIASTOLIC BLOOD PRESSURE: 73 MMHG | SYSTOLIC BLOOD PRESSURE: 132 MMHG | HEART RATE: 87 BPM | TEMPERATURE: 96.9 F | RESPIRATION RATE: 18 BRPM | WEIGHT: 185.85 LBS

## 2022-05-10 DIAGNOSIS — C18.7 MALIGNANT NEOPLASM OF SIGMOID COLON (HCC): Primary | ICD-10-CM

## 2022-05-10 DIAGNOSIS — D70.1 CHEMOTHERAPY-INDUCED NEUTROPENIA (HCC): ICD-10-CM

## 2022-05-10 DIAGNOSIS — T45.1X5A CHEMOTHERAPY-INDUCED NEUTROPENIA (HCC): ICD-10-CM

## 2022-05-10 PROCEDURE — 96409 CHEMO IV PUSH SNGL DRUG: CPT

## 2022-05-10 PROCEDURE — 96367 TX/PROPH/DG ADDL SEQ IV INF: CPT

## 2022-05-10 PROCEDURE — 96366 THER/PROPH/DIAG IV INF ADDON: CPT

## 2022-05-10 RX ORDER — DEXTROSE MONOHYDRATE 50 MG/ML
20 INJECTION, SOLUTION INTRAVENOUS ONCE
Status: DISCONTINUED | OUTPATIENT
Start: 2022-05-10 | End: 2022-05-14 | Stop reason: HOSPADM

## 2022-05-10 RX ORDER — SODIUM CHLORIDE 9 MG/ML
20 INJECTION, SOLUTION INTRAVENOUS ONCE AS NEEDED
Status: DISCONTINUED | OUTPATIENT
Start: 2022-05-10 | End: 2022-05-14 | Stop reason: HOSPADM

## 2022-05-10 RX ORDER — FLUOROURACIL 50 MG/ML
275 INJECTION, SOLUTION INTRAVENOUS ONCE
Status: COMPLETED | OUTPATIENT
Start: 2022-05-10 | End: 2022-05-10

## 2022-05-10 RX ADMIN — FLUOROURACIL 555 MG: 50 INJECTION, SOLUTION INTRAVENOUS at 14:57

## 2022-05-10 RX ADMIN — DEXAMETHASONE SODIUM PHOSPHATE: 10 INJECTION, SOLUTION INTRAMUSCULAR; INTRAVENOUS at 11:58

## 2022-05-10 RX ADMIN — SODIUM CHLORIDE 20 ML/HR: 0.9 INJECTION, SOLUTION INTRAVENOUS at 11:56

## 2022-05-10 RX ADMIN — LEUCOVORIN CALCIUM 550 MG: 500 INJECTION, POWDER, LYOPHILIZED, FOR SOLUTION INTRAMUSCULAR; INTRAVENOUS at 12:43

## 2022-05-12 ENCOUNTER — TELEPHONE (OUTPATIENT)
Dept: NEPHROLOGY | Facility: CLINIC | Age: 67
End: 2022-05-12

## 2022-05-12 ENCOUNTER — PATIENT MESSAGE (OUTPATIENT)
Dept: VASCULAR SURGERY | Facility: CLINIC | Age: 67
End: 2022-05-12

## 2022-05-12 ENCOUNTER — OFFICE VISIT (OUTPATIENT)
Dept: HEMATOLOGY ONCOLOGY | Facility: CLINIC | Age: 67
End: 2022-05-12
Payer: MEDICARE

## 2022-05-12 VITALS
TEMPERATURE: 97 F | RESPIRATION RATE: 18 BRPM | HEART RATE: 97 BPM | BODY MASS INDEX: 26.63 KG/M2 | DIASTOLIC BLOOD PRESSURE: 76 MMHG | HEIGHT: 70 IN | SYSTOLIC BLOOD PRESSURE: 136 MMHG | WEIGHT: 186 LBS | OXYGEN SATURATION: 99 %

## 2022-05-12 DIAGNOSIS — C18.7 MALIGNANT NEOPLASM OF SIGMOID COLON (HCC): Primary | ICD-10-CM

## 2022-05-12 DIAGNOSIS — N17.9 ACUTE KIDNEY INJURY SUPERIMPOSED ON CHRONIC KIDNEY DISEASE (HCC): ICD-10-CM

## 2022-05-12 DIAGNOSIS — C18.7 MALIGNANT NEOPLASM OF SIGMOID COLON (HCC): ICD-10-CM

## 2022-05-12 DIAGNOSIS — T45.1X5A CHEMOTHERAPY-INDUCED NEUTROPENIA (HCC): Primary | ICD-10-CM

## 2022-05-12 DIAGNOSIS — N18.9 ACUTE KIDNEY INJURY SUPERIMPOSED ON CHRONIC KIDNEY DISEASE (HCC): ICD-10-CM

## 2022-05-12 DIAGNOSIS — D64.9 NORMOCYTIC ANEMIA: ICD-10-CM

## 2022-05-12 DIAGNOSIS — D70.1 CHEMOTHERAPY-INDUCED NEUTROPENIA (HCC): Primary | ICD-10-CM

## 2022-05-12 PROCEDURE — 99214 OFFICE O/P EST MOD 30 MIN: CPT | Performed by: INTERNAL MEDICINE

## 2022-05-12 RX ORDER — SEVELAMER HYDROCHLORIDE 800 MG/1
800 TABLET, FILM COATED ORAL
Qty: 270 TABLET | Refills: 3 | Status: SHIPPED | OUTPATIENT
Start: 2022-05-12 | End: 2022-05-13

## 2022-05-12 NOTE — PROGRESS NOTES
Hematology/Oncology Outpatient Follow-up  Alonzo Pisano 74 y o  male 1955 [id number]    Date:  5/12/2022        Assessment and Plan:  1  Malignant neoplasm of sigmoid colon ([facility] [address] )  The patient will be continued on the weekly adjuvant adjusted dose of 5 fluorouracil/leucovorin which is being given 5 weeks on 1 week off  He will be due for cycle 2 day 22 on 05/17/2022  The patient was told that we will most likely adjust the dose up by 10% hopefully by the start of cycle 3 if he continues to do well  We will continue to increase the dose of the adjuvant chemotherapy to the maximum tolerated dose  He will also need to get imaging after the completion of cycle 3 preferably with a PET-CT scan since IV contrast imaging is not available  - CBC and differential; Future  - Comprehensive metabolic panel; Future  - Magnesium; Future  - Ferritin; Future  - Iron Panel (Includes Ferritin, Iron Sat%, Iron, and TIBC); Future  - CBC and differential; Standing  - Comprehensive metabolic panel; Standing  - Magnesium; Standing  - CBC and differential  - Comprehensive metabolic panel  - Magnesium    2  Normocytic anemia  He received 1 dose of iron IV since he was found to have iron deficiency  We will check his iron panel to see if he would need another dose of iron IV   - Iron Panel (Includes Ferritin, Iron Sat%, Iron, and TIBC); Future  - Ferritin; Future  - Ferritin; Future  - Iron Panel (Includes Ferritin, Iron Sat%, Iron, and TIBC); Future        HPI:  The patient came today for follow-up visit accompanied by his wife  He continues to be on hemodialysis 3 days a week which was started just recently  The stated that his overall condition and performance status improved after he was started on hemodialysis  Recent blood work on 05/09/2022 showed hemoglobin of 11 4 with normal white cells and platelets  Potassium was 4 2 with creatinine of 2 9  Calcium 9 3 with normal liver enzymes    CEA level was normal   Magnesium 1 8   Oncology History Overview Note   Patient with multiple comorbid conditions including CHF, advanced chronic kidney disease, diabetes mellitus, arthritis, sleep apnea, etc   He apparently had multiple colonoscopies 2021  He was found to have adenocarcinoma in the sigmoid region rising from tubular adenoma on 01/08/2021  He had a PET-CT scan on 02/19/2021 which showed uptake in the sigmoid colon otherwise no finding for hypermetabolic metastasis was found  The patient had 2 other colonoscopies and finally had his laparoscopic surgery on 10/21/2021 which showed showed residual adenocarcinoma moderately differentiated arising in the tubular adenoma with high-grade dysplasia, pT3 with 3/16 lymph node involvement  All margins were negative without obvious lymphovascular or perineural invasion  The patient subsequently on 11/24/2021 had a CT scan of the chest abdomen pelvis and IV contrast was given by mistake which show resulted in significant worsening of his kidney function with creatinine around 7  The he required hospitalization  His baseline creatinine level is around 3  The CT scan on 11/24/2021 showed 1 new cm nodule ground-glass density in the superior segment of the right lower lobe which was thought to be inflammatory versus neoplastic  There are 2 areas of heterogeneous attenuation also in the abdominal area of unknown significance  Follow-up in 3 months was suggested  PET/CT 1/4/22: IMPRESSION:  1   1 0 cm groundglass right lower lobe lung nodules seen on CT of chest dated 11/24/2021 is not definitively visualized on the current exam and likely has resolved although evaluation of the lung fields is limited secondary to respiratory motion   artifact  There is no focal FDG activity in the chest characteristic of hypermetabolic malignancy  Given limitations, short interval follow-up with CT of chest in 3 months is recommended to document resolution    2   Multifocal peritoneal hypermetabolic soft tissue foci involving the left mid abdomen and extending along the left paracolic gutter to the central lower abdomen/pelvis, most of which appears to be associated with internal attenuation and is most   suggestive of fat necrosis with underlying peritoneal carcinomatosis not excluded, particularly in the anterior left mid abdomen  As recommended previously, short interval follow-up with imaging in 3 months is recommended to ensure stability and exclude   developing peritoneal carcinomatosis  3   Moderate bilateral pleural effusions  4   Stable in size minimally FDG avid mediastinal/right hilar/left inguinal lymph nodes of uncertain clinical significance but most suggestive of a benign inflammatory process  Attention to these regions on follow-up scans is recommended  5   Air-fluid levels within the bilateral maxillary sinuses for which correlation is recommended to exclude acute sinusitis  He was started on the FOLFOX regimen without the oxaliplatin 1/2022  He was educated he will be educated extensively about the chemotherapy and the potential side effects including renal failure/hemodialysis risk  Patient did not tolerate the 1st cycle of chemotherapy well at all which resulted in hospitalization lesion and need for dialysis x2 treatments  He sought 2nd opinion in network with 1 of our colleagues Sheila Russell  Decision was made to trial 5 FU/Leucovorin at 50% of the usual dose (250mg/m2) via IV push weekly 5 weeks on with 1 week of a break which can be adjusted/titrated slowly according to patient's tolerance        Colon cancer ([facility] [address] )   10/23/2021 Initial Diagnosis    Colon cancer ([facility] [address] )     1/18/2022 - 1/20/2022 Chemotherapy    fluorouracil (ADRUCIL), 400 mg/m2 = 830 mg, Intravenous, Once, 1 of 1 cycle  Administration: 830 mg (1/18/2022)  leucovorin calcium IVPB, 828 mg, Intravenous, Once, 1 of 12 cycles  Administration: 800 mg (1/18/2022)  fluorouracil (ADRUCIL) ambulatory infusion Soln, 1,200 mg/m2/day = 4,970 mg, Intravenous, Over 46 hours, 1 of 12 cycles  Dose modification: 800 mg/m2/day (original dose 1,200 mg/m2/day, Cycle 2, Reason: Other (Must fill in a comment), Comment: Dr young )     3/7/2022 -  Chemotherapy    fluorouracil (ADRUCIL), 515 mg (62 5 % of original dose 400 mg/m2), Intravenous, Once, 2 of 12 cycles  Dose modification: 250 mg/m2 (original dose 400 mg/m2, Cycle 1, Reason: Dose modified as per discussion with consulting physician), 275 mg/m2 (original dose 400 mg/m2, Cycle 2, Reason: Max Dose Reached)  Administration: 500 mg (3/7/2022), 500 mg (3/14/2022), 500 mg (3/21/2022), 500 mg (3/28/2022), 500 mg (4/4/2022), 565 mg (4/18/2022), 555 mg (5/3/2022), 555 mg (5/10/2022)  leucovorin calcium IVPB, 515 mg (62 5 % of original dose 400 mg/m2), Intravenous, Once, 2 of 12 cycles  Dose modification: 250 mg/m2 (original dose 400 mg/m2, Cycle 1, Reason: Dose modified as per discussion with consulting physician), 275 mg/m2 (original dose 400 mg/m2, Cycle 2, Reason: Max Dose Reached)  Administration: 500 mg (3/7/2022), 500 mg (3/14/2022), 500 mg (3/21/2022), 500 mg (3/28/2022), 500 mg (4/4/2022), 550 mg (4/18/2022), 550 mg (5/3/2022), 550 mg (5/10/2022)         Interval history:    ROS: Review of Systems   Constitutional: Positive for fatigue  Negative for chills and fever  HENT: Positive for hearing loss  Negative for ear pain and sore throat  Eyes: Negative for pain and visual disturbance  Respiratory: Positive for shortness of breath  Negative for cough  Cardiovascular: Negative for chest pain and palpitations  Gastrointestinal: Positive for constipation  Negative for abdominal pain and vomiting  Genitourinary: Negative for dysuria and hematuria  Musculoskeletal: Negative for arthralgias and back pain  Skin: Positive for rash  Negative for color change  Neurological: Negative for seizures and syncope  All other systems reviewed and are negative        Past Medical History:   Diagnosis Date    Anemia     iron def    Arthritis     OA    Bruise of both arms     forearms and both hands    Bruises easily     [facility] 09/01/2021    colon    CH[facility]congestive heart failure) ([facility])     Chronic kidney disease     CPAP (continuous positive airway pressure) dependence     Diabetes mellitus ([facility] [address] )     Diabetic foot ulcer ([facility])     Eczema     Erectile dysfunction     Gout     Heart murmur     History of permanent cardiac pacemaker placement 11/2018    Hyperlipidemia     Hypertension     Hypoglycemia 3/8/2019    Murmur     Nephropathy     Osteoarthritis     Pacemaker 11/06/2018    PAD (peripheral artery disease) ([facility])     Seasonal allergies     Sleep apnea     Toe infection     bilat great toes    Vertigo     Walks frequently     Wears dentures     upper    Wears glasses        Past Surgical History:   Procedure Laterality Date    CARDIAC PACEMAKER PLACEMENT      CARPAL TUNNEL RELEASE Left     CARPAL TUNNEL RELEASE Right     CARPAL TUNNEL RELEASE      COLONOSCOPY  08/2020    COLONOSCOPY      FL GUIDED CENTRAL VENOUS ACCESS DEVICE INSERTION  1/11/2022    FOOT AMPUTATION Left 2/10/2020    Procedure: PARTIAL 1ST RAY AMPUTATION;  Surgeon: Dany Ibarra DPM;  [facility];  Service: Podiatry    INCISION AND DRAINAGE OF WOUND Left 1/12/2020    Procedure: INCISION AND DRAINAGE (I&D) EXTREMITY AND REMOVAL OF SESMOID BONE;  Surgeon: Esperanza Desir DPM;  [facility];  Service: Podiatry    IR OTHER  1/21/2022    IR PICC REPOSITION  1/14/2020    IR TUNNELED DIALYSIS CATHETER PLACEMENT  4/26/2022    KNEE ARTHROSCOPY Left     KNEE ARTHROSCOPY Right     KNEE SURGERY      AR AMPUTATION TOE,I-P JT Bilateral 5/2/2017    Procedure: PARTIAL AMPUTATION RIGHT AND LEFT HALLUX ;  Surgeon: Dany Ibarra DPM;  [facility];  Service: Podiatry    AR AMPUTATION TOE,MT-P JT Left 7/25/2017    Procedure: 2ND TOE AMPUTATION;  Surgeon: Dany Ibarra DPM; [facility];  Service: Podiatry    SD INSJ TUNNELED CTR VAD W/SUBQ PORT AGE 5 YR/> N/A 2022    Procedure: INSERTION VENOUS PORT ( PORT-A-CATH) IR;  Surgeon: Karla Leiva DO;  [facility];  Service: Interventional Radiology    RESECTION LOW ANTERIOR LAPAROSCOPIC N/A 10/21/2021    Procedure: RESECTION LOW ANTERIOR LAPAROSCOPIC;  Surgeon: Isa Santos MD;  [facility];  Service: Colorectal    SHOULDER ARTHROSCOPY Left     with screws,RTC    SHOULDER SURGERY      TOE AMPUTATION Left 2020    Procedure: Page Pu;  Surgeon: Mami Slaughter DPM;  [facility];  Service: Podiatry    UPPER GASTROINTESTINAL ENDOSCOPY  2020    Intestinal metaplasia prepyloric    VASECTOMY         Social History     Socioeconomic History    Marital status: /Civil Union     Spouse name: None    Number of children: None    Years of education: None    Highest education level: None   Occupational History    None   Tobacco Use    Smoking status: Former Smoker     Packs/day: 0 50     Years: 20 00     Pack years: 10 00     Types: Cigarettes     Start date:      Quit date:      Years since quittin 3    Smokeless tobacco: Never Used    Tobacco comment: quit 10 years ago   Vaping Use    Vaping Use: Never used   Substance and Sexual Activity    Alcohol use: Never     Alcohol/week: 0 0 standard drinks    Drug use: No    Sexual activity: Not Currently     Partners: Female   Other Topics Concern    None   Social History Narrative    ** Merged History Encounter **         Daily cola consumption (2 cans/day)     Social Determinants of Health     Financial Resource Strain: Not on file   Food Insecurity: No Food Insecurity    Worried About Running Out of Food in the Last Year: Never true    Joel of Food in the Last Year: Never true   Transportation Needs: No Transportation Needs    Lack of Transportation (Medical):  No    Lack of Transportation (Non-Medical): No   Physical Activity: Not on file   Stress: Not on file   Social Connections: Not on file   Intimate Partner Violence: Not on file   Housing Stability: Low Risk     Unable to Pay for Housing in the Last Year: No    Number of Places Lived in the Last Year: 1    Unstable Housing in the Last Year: No       Family History   Problem Relation Age of Onset    Heart disease Father         passed away    Hypertension Father     Pulmonary embolism Father     Hypertension Mother         assed away       Allergies   Allergen Reactions    Invokana [Canagliflozin] Other (See Comments)     Caused circulation problems    Lamisil [Terbinafine] Blisters     Wife states " His skin peeled from head to toe"    Other Swelling     Pomegranate - facial swelling, no swelling of tongue, esophagus  Adhesive tape   Latex Rash         Current Outpatient Medications:     allopurinol (ZYLOPRIM) 300 mg tablet, Take 1 tablet (300 mg total) by mouth every morning, Disp: 90 tablet, Rfl: 0    amLODIPine (NORVASC) 10 mg tablet, Take 1 tablet (10 mg total) by mouth daily, Disp: 90 tablet, Rfl: 3    apixaban (ELIQUIS) 5 mg, Take 1 tablet (5 mg total) by mouth every 12 (twelve) hours, Disp: 180 tablet, Rfl: 3    Dupilumab (Dupixent) 300 MG/2ML SOPN, Inject 300 mg under the skin Once every 2 weeks, Disp: , Rfl:     famotidine (PEPCID) 40 MG tablet, TAKE 1 TABLET BY MOUTH EVERY DAY, Disp: 90 tablet, Rfl: 3    Insulin Degludec-Liraglutide (Xultophy) 100 units-3 6 mg/mL injection pen, Inject 19 units daily  , Disp: 18 mL, Rfl: 1    Insulin Pen Needle (BD Pen Needle Zenaida U/F) 32G X 4 MM MISC, Use 1 daily, Disp: 100 each, Rfl: 2    metoprolol tartrate (LOPRESSOR) 50 mg tablet, Take 1 tablet (50 mg total) by mouth every 12 (twelve) hours, Disp: 180 tablet, Rfl: 3    Multiple Vitamin (MULTIVITAMIN) tablet, Take 1 tablet by mouth daily IN AM, Disp: , Rfl:     omega-3-acid ethyl esters (LOVAZA) 1 g capsule, Take 2 capsules (2 g total) by mouth 2 (two) times a day Mail print prescription to pt, Disp: 360 capsule, Rfl: 3    ondansetron (ZOFRAN) 8 mg tablet, Take 1 tablet (8 mg total) by mouth every 8 (eight) hours as needed for nausea or vomiting, Disp: 20 tablet, Rfl: 3    OneTouch Ultra test strip, USE TO TEST BLOOD SUGAR 3 TIMES A DAY, Disp: 100 each, Rfl: 3    potassium chloride (K-DUR,KLOR-CON) 10 mEq tablet, Take 2 tablets (20 mEq total) by mouth daily, Disp: 60 tablet, Rfl: 3    sevelamer (RENAGEL) 800 mg tablet, Take 1 tablet (800 mg total) by mouth 3 (three) times a day with meals, Disp: 270 tablet, Rfl: 3    simvastatin (ZOCOR) 20 mg tablet, Take 1 tablet (20 mg total) by mouth daily at bedtime, Disp: 90 tablet, Rfl: 3    sodium bicarbonate 650 mg tablet, Take 1 tablet (650 mg total) by mouth 2 (two) times daily after meals, Disp: 180 tablet, Rfl: 3    tamsulosin (FLOMAX) 0 4 mg, TAKE 1 CAPSULE BY MOUTH EVERY DAY WITH DINNER, Disp: 90 capsule, Rfl: 3    torsemide (DEMADEX) 20 mg tablet, Take 2 tablets (40 mg total) by mouth 2 (two) times a day 40mg am and 20mg pm, Disp: 270 tablet, Rfl: 3  No current facility-administered medications for this visit  Facility-Administered Medications Ordered in Other Visits:     dextrose 5 % infusion, 20 mL/hr, Intravenous, Once, Maggie Cuevas MD    sodium chloride 0 9 % infusion, 20 mL/hr, Intravenous, Once PRN, Maggie Cuevas MD, Stopped at 05/10/22 1512      Physical Exam:  /76 (BP Location: Left arm, Patient Position: Sitting, Cuff Size: Adult)   Pulse 97   Temp (!) 97 °F (36 1 °C)   Resp 18   Ht 5' 10" (1 778 m)   Wt 84 4 kg (186 lb)   SpO2 99%   BMI 26 69 kg/m²     Physical Exam  Constitutional:       Appearance: He is well-developed  HENT:      Head: Normocephalic and atraumatic  Eyes:      General: No scleral icterus  Right eye: No discharge  Left eye: No discharge        Conjunctiva/sclera: Conjunctivae normal       Pupils: Pupils are equal, round, and reactive to light  Neck:      Thyroid: No thyromegaly  Trachea: No tracheal deviation  Cardiovascular:      Rate and Rhythm: Normal rate and regular rhythm  Heart sounds: Murmur heard  No friction rub  Pulmonary:      Effort: Pulmonary effort is normal  No respiratory distress  Breath sounds: Normal breath sounds  No wheezing or rales  Chest:      Chest wall: No tenderness  Abdominal:      General: There is no distension  Palpations: Abdomen is soft  There is no hepatomegaly or splenomegaly  Tenderness: There is no abdominal tenderness  There is no guarding or rebound  Musculoskeletal:         General: No tenderness or deformity  Normal range of motion  Cervical back: Normal range of motion and neck supple  Lymphadenopathy:      Cervical: No cervical adenopathy  Skin:     General: Skin is warm and dry  Coloration: Skin is not pale  Findings: Bruising present  No erythema or rash  Neurological:      Mental Status: He is alert and oriented to person, place, and time  Cranial Nerves: No cranial nerve deficit  Coordination: Coordination normal       Deep Tendon Reflexes: Reflexes are normal and symmetric  Psychiatric:         Behavior: Behavior normal          Thought Content:  Thought content normal          Judgment: Judgment normal            Labs:  Lab Results   Component Value Date    WBC 8 78 05/09/2022    HGB 11 4 (L) 05/09/2022    HCT 34 0 (L) 05/09/2022    MCV 94 05/09/2022     05/09/2022     Lab Results   Component Value Date     (L) 04/10/2017    K 4 2 05/09/2022     05/09/2022    CO2 27 05/09/2022    BUN 58 (H) 05/09/2022    CREATININE 2 94 (H) 05/09/2022    GLUCOSE 142 (H) 11/01/2018    GLUF 99 05/09/2022    CALCIUM 9 3 05/09/2022    CORRECTEDCA 10 1 05/09/2022    AST 42 05/09/2022    ALT 49 05/09/2022    ALKPHOS 133 (H) 05/09/2022    PROT 6 9 04/10/2017    BILITOT 0 7 04/10/2017    EGFR 20 05/09/2022 No results found for: TSH    Patient voiced understanding and agreement in the above discussion  Aware to contact our office with questions/symptoms in the interim

## 2022-05-12 NOTE — TELEPHONE ENCOUNTER
Called patient to schedule follow up appointment  Patient stated he was no longer seeing Dr Babs Ludwig due to switching to [facility] Mujica   Patient has been removed from recall list

## 2022-05-13 DIAGNOSIS — N18.6 ESRD (END STAGE RENAL DISEASE) ON DIALYSIS (HCC): Primary | ICD-10-CM

## 2022-05-13 DIAGNOSIS — Z99.2 ESRD (END STAGE RENAL DISEASE) ON DIALYSIS (HCC): Primary | ICD-10-CM

## 2022-05-13 DIAGNOSIS — N25.81 SECONDARY HYPERPARATHYROIDISM OF RENAL ORIGIN (HCC): ICD-10-CM

## 2022-05-13 RX ORDER — CHOLECALCIFEROL (VITAMIN D3) 10 MCG
1 TABLET ORAL DAILY
Qty: 90 CAPSULE | Refills: 3 | Status: SHIPPED | OUTPATIENT
Start: 2022-05-13

## 2022-05-13 RX ORDER — SEVELAMER CARBONATE 800 MG/1
800 TABLET, FILM COATED ORAL
Qty: 270 TABLET | Refills: 3 | Status: SHIPPED | OUTPATIENT
Start: 2022-05-13

## 2022-05-13 NOTE — TELEPHONE ENCOUNTER
Pt's wife had cc appt moved up to 5/16/22 with Dr Richard Acosta  Pt is scheduled for surgery 6/1/22 in [facility] with Dr Eddi Mccormick as long as he is cleared by Dr Richard Acosta  Pt's wife, Norman Herrmann, is aware   v

## 2022-05-16 ENCOUNTER — OFFICE VISIT (OUTPATIENT)
Dept: CARDIOLOGY CLINIC | Facility: CLINIC | Age: 67
End: 2022-05-16
Payer: MEDICARE

## 2022-05-16 ENCOUNTER — APPOINTMENT (OUTPATIENT)
Dept: LAB | Facility: CLINIC | Age: 67
End: 2022-05-16
Payer: MEDICARE

## 2022-05-16 ENCOUNTER — PREP FOR PROCEDURE (OUTPATIENT)
Dept: VASCULAR SURGERY | Facility: CLINIC | Age: 67
End: 2022-05-16

## 2022-05-16 VITALS
DIASTOLIC BLOOD PRESSURE: 66 MMHG | HEIGHT: 70 IN | WEIGHT: 186.4 LBS | SYSTOLIC BLOOD PRESSURE: 114 MMHG | BODY MASS INDEX: 26.69 KG/M2 | HEART RATE: 69 BPM

## 2022-05-16 DIAGNOSIS — I50.32 CHRONIC DIASTOLIC (CONGESTIVE) HEART FAILURE (HCC): Primary | ICD-10-CM

## 2022-05-16 DIAGNOSIS — I35.0 NONRHEUMATIC AORTIC VALVE STENOSIS: ICD-10-CM

## 2022-05-16 DIAGNOSIS — I48.19 PERSISTENT ATRIAL FIBRILLATION (HCC): ICD-10-CM

## 2022-05-16 DIAGNOSIS — E78.2 MIXED HYPERLIPIDEMIA: ICD-10-CM

## 2022-05-16 DIAGNOSIS — I34.2 NONRHEUMATIC MITRAL VALVE STENOSIS: ICD-10-CM

## 2022-05-16 DIAGNOSIS — I49.5 SSS (SICK SINUS SYNDROME) (HCC): ICD-10-CM

## 2022-05-16 DIAGNOSIS — N18.32 STAGE 3B CHRONIC KIDNEY DISEASE (HCC): ICD-10-CM

## 2022-05-16 DIAGNOSIS — I10 PRIMARY HYPERTENSION: ICD-10-CM

## 2022-05-16 LAB
ALBUMIN SERPL BCP-MCNC: 3.1 G/DL (ref 3.5–5)
ALP SERPL-CCNC: 121 U/L (ref 46–116)
ALT SERPL W P-5'-P-CCNC: 33 U/L (ref 12–78)
ANION GAP SERPL CALCULATED.3IONS-SCNC: 9 MMOL/L (ref 4–13)
APTT PPP: 35 SECONDS (ref 23–37)
AST SERPL W P-5'-P-CCNC: 30 U/L (ref 5–45)
BASOPHILS # BLD AUTO: 0.07 THOUSANDS/ΜL (ref 0–0.1)
BASOPHILS NFR BLD AUTO: 0 % (ref 0–1)
BILIRUB SERPL-MCNC: 1.15 MG/DL (ref 0.2–1)
BUN SERPL-MCNC: 21 MG/DL (ref 5–25)
CALCIUM ALBUM COR SERPL-MCNC: 9.5 MG/DL (ref 8.3–10.1)
CALCIUM SERPL-MCNC: 8.8 MG/DL (ref 8.3–10.1)
CHLORIDE SERPL-SCNC: 98 MMOL/L (ref 100–108)
CO2 SERPL-SCNC: 27 MMOL/L (ref 21–32)
CREAT SERPL-MCNC: 1.71 MG/DL (ref 0.6–1.3)
EOSINOPHIL # BLD AUTO: 0.41 THOUSAND/ΜL (ref 0–0.61)
EOSINOPHIL NFR BLD AUTO: 3 % (ref 0–6)
ERYTHROCYTE [DISTWIDTH] IN BLOOD BY AUTOMATED COUNT: 16.2 % (ref 11.6–15.1)
GFR SERPL CREATININE-BSD FRML MDRD: 40 ML/MIN/1.73SQ M
GLUCOSE P FAST SERPL-MCNC: 83 MG/DL (ref 65–99)
HCT VFR BLD AUTO: 34.1 % (ref 36.5–49.3)
HGB BLD-MCNC: 11.6 G/DL (ref 12–17)
IMM GRANULOCYTES # BLD AUTO: 0.24 THOUSAND/UL (ref 0–0.2)
IMM GRANULOCYTES NFR BLD AUTO: 2 % (ref 0–2)
INR PPP: 1.34 (ref 0.84–1.19)
LYMPHOCYTES # BLD AUTO: 1.39 THOUSANDS/ΜL (ref 0.6–4.47)
LYMPHOCYTES NFR BLD AUTO: 9 % (ref 14–44)
MAGNESIUM SERPL-MCNC: 1.7 MG/DL (ref 1.6–2.6)
MCH RBC QN AUTO: 31.9 PG (ref 26.8–34.3)
MCHC RBC AUTO-ENTMCNC: 34 G/DL (ref 31.4–37.4)
MCV RBC AUTO: 94 FL (ref 82–98)
MONOCYTES # BLD AUTO: 1.14 THOUSAND/ΜL (ref 0.17–1.22)
MONOCYTES NFR BLD AUTO: 7 % (ref 4–12)
NEUTROPHILS # BLD AUTO: 12.39 THOUSANDS/ΜL (ref 1.85–7.62)
NEUTS SEG NFR BLD AUTO: 79 % (ref 43–75)
NRBC BLD AUTO-RTO: 0 /100 WBCS
PLATELET # BLD AUTO: 281 THOUSANDS/UL (ref 149–390)
PMV BLD AUTO: 10 FL (ref 8.9–12.7)
POTASSIUM SERPL-SCNC: 3.7 MMOL/L (ref 3.5–5.3)
PROT SERPL-MCNC: 7.4 G/DL (ref 6.4–8.2)
PROTHROMBIN TIME: 16 SECONDS (ref 11.6–14.5)
RBC # BLD AUTO: 3.64 MILLION/UL (ref 3.88–5.62)
SODIUM SERPL-SCNC: 134 MMOL/L (ref 136–145)
WBC # BLD AUTO: 15.64 THOUSAND/UL (ref 4.31–10.16)

## 2022-05-16 PROCEDURE — 99214 OFFICE O/P EST MOD 30 MIN: CPT | Performed by: INTERNAL MEDICINE

## 2022-05-16 PROCEDURE — 36415 COLL VENOUS BLD VENIPUNCTURE: CPT

## 2022-05-16 PROCEDURE — 93000 ELECTROCARDIOGRAM COMPLETE: CPT | Performed by: INTERNAL MEDICINE

## 2022-05-16 PROCEDURE — 85730 THROMBOPLASTIN TIME PARTIAL: CPT

## 2022-05-16 PROCEDURE — 85610 PROTHROMBIN TIME: CPT

## 2022-05-16 PROCEDURE — 80053 COMPREHEN METABOLIC PANEL: CPT | Performed by: INTERNAL MEDICINE

## 2022-05-16 PROCEDURE — 85025 COMPLETE CBC W/AUTO DIFF WBC: CPT | Performed by: INTERNAL MEDICINE

## 2022-05-16 PROCEDURE — 83735 ASSAY OF MAGNESIUM: CPT | Performed by: INTERNAL MEDICINE

## 2022-05-16 NOTE — TELEPHONE ENCOUNTER
Received Cardiac Clearance  Per OV of Dr Avelino Giles 5/16/22:    8  Preoperative clearance for advance renal disease  Patient appears to be somewhat increased but not high risk for upcoming procedure  He appears compensated in terms of his heart failure  His atrial flutter rate is well controlled  Recommend stopping Eliquis 2 days before the procedure  The last dose of Eliquis should be the p m  Of 5/29/22  This can be resumed at the discretion of the surgeon afterwards  Also recommend placing a magnet over the patient's pacemaker

## 2022-05-16 NOTE — PROGRESS NOTES
Cardiology Follow Up    Wilfredo Santana  1955  [id number]  [address] [facility] [id number] [phone]      Reason for visit: Patient here for FU of chronic diastolic CHF with large noncardiogenic component due to chronic kidney disease  He also has hypertension with renal disease, moderate mitral stenosis and mixed aortic valve disease  Also has persistent atrial flutter and sick sinus syndrome with pacer in Situ  Patient also has hyperlipidemia  Here as well for clearance for fistula creation early next      1  Chronic diastolic (congestive) heart failure (HCC)     2  Stage 3b chronic kidney disease ([address] )  Ambulatory Referral to Cardiology    POCT ECG   3  Primary hypertension     4  Nonrheumatic mitral valve stenosis     5  Nonrheumatic aortic valve stenosis     6  Persistent atrial fibrillation ([address] )     7  SSS (sick sinus syndrome) ([address] )     8  Mixed hyperlipidemia         Interval History:  The patient will be having a fistula placed in his arm in light of worsening renal function  Since his last visit he does get dyspnea with modest effort  He denies edema  His weight is down considerably since he saw me 5 months ago  He was recently hospitalized for congestive heart failure  He denies chest pain, palpitations or lightheadedness      Patient Active Problem List   Diagnosis    Bilateral leg edema    Diabetic ulcer of toe of left foot associated with type 2 diabetes mellitus ([address] )    Easy bruising    Eczema    Erectile dysfunction of non-organic origin    Gout    Hyperlipidemia    Uremic acidosis    Arthritis    Peripheral arterial disease (HCC)    PVCs (premature ventricular contractions)    Rhinitis    Systolic murmur    Vertigo    Complete heart block (HCC)    Metabolic acidosis    ELZBIETA (acute kidney injury) ([address] )    Other hyperlipidemia    Persistent atrial fibrillation ([address] )    Persistent proteinuria    Volume overload    Urinary retention    NICOLASA (obstructive sleep apnea)    Type 2 diabetes mellitus with chronic kidney disease, with long-term current use of insulin (HCC)    Hypertension    Stage 4 chronic kidney disease (HCC)    Nonrheumatic aortic valve stenosis    Anemia    Fever    Mitral valve stenosis    Abnormal CT of the chest    Hyperkalemia    Acute pulmonary edema (HCC)    Chronic diastolic (congestive) heart failure (HCC)    Left renal artery stenosis (HCC)    S/P amputation of lesser toe, left (HCC)    S/P amputation of lesser toe, right (HCC)    Elevated troponin I level    Staphylococcus aureus bacteremia    Type 2 diabetes mellitus with diabetic neuropathy, without long-term current use of insulin (HCC)    Hyponatremia    Iron deficiency anemia    Anxiety    Osteomyelitis of left foot (HCC)    Amputation of left great toe (HCC)    Multiple drug resistant organism (MDRO) culture positive    Renovascular hypertension    SSS (sick sinus syndrome) (HCC)    Chronic obstructive pulmonary disease (HCC)    Absence of toe (HCC)    Chronic painful diabetic neuropathy (HCC)    Onychomycosis    Colon cancer (HCC)    Acute kidney injury superimposed on chronic kidney disease (HCC)    RSV (respiratory syncytial virus pneumonia)    Chemotherapy-induced neutropenia (HCC)    Chemotherapy-induced nausea    Acute on chronic diastolic congestive heart failure (HCC)    Other iron deficiency anemias     Past Medical History:   Diagnosis Date    Anemia     iron def    Arthritis     OA    Bruise of both arms     forearms and both hands    Bruises easily     Cancer ([facility] ) 09/01/2021    colon    CHF (congestive heart failure) (HCC)     Chronic kidney disease     CPAP (continuous positive airway pressure) dependence     Diabetes mellitus (HCC)     Diabetic foot ulcer (HCC)     Eczema     Erectile dysfunction     Gout     Heart murmur     History of permanent cardiac pacemaker placement 2018    Hyperlipidemia     Hypertension     Hypoglycemia 3/8/2019    Murmur     Nephropathy     Osteoarthritis     Pacemaker 2018    PAD (peripheral artery disease) (HCC)     Seasonal allergies     Sleep apnea     Toe infection     bilat great toes    Vertigo     Walks frequently     Wears dentures     upper    Wears glasses      Social History     Socioeconomic History    Marital status: /Civil Union     Spouse name: Not on file    Number of children: Not on file    Years of education: Not on file    Highest education level: Not on file   Occupational History    Not on file   Tobacco Use    Smoking status: Former Smoker     Packs/day: 0 50     Years: 20 00     Pack years: 10 00     Types: Cigarettes     Start date: 1     Quit date:      Years since quittin 3    Smokeless tobacco: Never Used    Tobacco comment: quit 10 years ago   Vaping Use    Vaping Use: Never used   Substance and Sexual Activity    Alcohol use: Never     Alcohol/week: 0 0 standard drinks    Drug use: No    Sexual activity: Not Currently     Partners: Female   Other Topics Concern    Not on file   Social History Narrative    ** Merged History Encounter **         Daily cola consumption (2 cans/day)     Social Determinants of Health     Financial Resource Strain: Not on file   Food Insecurity: No Food Insecurity    Worried About Running Out of Food in the Last Year: Never true    Joel of Food in the Last Year: Never true   Transportation Needs: No Transportation Needs    Lack of Transportation (Medical): No    Lack of Transportation (Non-Medical):  No   Physical Activity: Not on file   Stress: Not on file   Social Connections: Not on file   Intimate Partner Violence: Not on file   Housing Stability: Low Risk     Unable to Pay for Housing in the Last Year: No    Number of Places Lived in the Last Year: 1    Unstable Housing in the Last Year: No Family History   Problem Relation Age of Onset    Heart disease Father         passed away   [facility] Hypertension Father     Pulmonary embolism Father     Hypertension Mother         assed away     Past Surgical History:   Procedure Laterality Date    CARDIAC PACEMAKER PLACEMENT      CARPAL TUNNEL RELEASE Left     CARPAL TUNNEL RELEASE Right     CARPAL TUNNEL RELEASE      COLONOSCOPY  08/2020    COLONOSCOPY      FL GUIDED CENTRAL VENOUS ACCESS DEVICE INSERTION  1/11/2022    FOOT AMPUTATION Left 2/10/2020    Procedure: PARTIAL 1ST RAY AMPUTATION;  Surgeon: Brittaney Oviedo DPM;  [facility];  Service: Podiatry    INCISION AND DRAINAGE OF WOUND Left 1/12/2020    Procedure: INCISION AND DRAINAGE (I&D) EXTREMITY AND REMOVAL OF SESMOID BONE;  Surgeon: Denise Rolle DPM;  [facility];  Service: Podiatry    IR OTHER  1/21/2022    IR PICC REPOSITION  1/14/2020    IR TUNNELED DIALYSIS CATHETER PLACEMENT  4/26/2022    KNEE ARTHROSCOPY Left     KNEE ARTHROSCOPY Right     KNEE SURGERY      NH AMPUTATION TOE,I-P JT Bilateral 5/2/2017    Procedure: PARTIAL AMPUTATION RIGHT AND LEFT HALLUX ;  Surgeon: Brittaney Oviedo DPM;  [facility];  Service: Podiatry    NH AMPUTATION TOE,MT-P JT Left 7/25/2017    Procedure: 2ND TOE AMPUTATION;  Surgeon: Brittaney Oviedo DPM;  [facility];  Service: Podiatry    NH INSJ TUNNELED CTR VAD W/SUBQ PORT AGE 5 YR/> N/A 1/11/2022    Procedure: INSERTION VENOUS PORT ( PORT-A-CATH) IR;  Surgeon: Emani Rubi DO;  [facility];  Service: Interventional Radiology    RESECTION LOW ANTERIOR LAPAROSCOPIC N/A 10/21/2021    Procedure: RESECTION LOW ANTERIOR LAPAROSCOPIC;  Surgeon: An Ramso MD;  [facility];  Service: Colorectal    SHOULDER ARTHROSCOPY Left     with screws,RTC    SHOULDER SURGERY      TOE AMPUTATION Left 1/8/2020    Procedure: [place] Reas;  Surgeon: Denise Rolle DPM;  [facility];  Service: Podiatry  UPPER GASTROINTESTINAL ENDOSCOPY  03/2020    Intestinal metaplasia prepyloric    VASECTOMY         Current Outpatient Medications:     allopurinol (ZYLOPRIM) 300 mg tablet, Take 1 tablet (300 mg total) by mouth every morning, Disp: 90 tablet, Rfl: 0    amLODIPine (NORVASC) 10 mg tablet, Take 1 tablet (10 mg total) by mouth daily, Disp: 90 tablet, Rfl: 3    apixaban (ELIQUIS) 5 mg, Take 1 tablet (5 mg total) by mouth every 12 (twelve) hours, Disp: 180 tablet, Rfl: 3    b complex-vitamin C-folic acid (NEPHROCAPS) 1 mg capsule, Take 1 capsule by mouth in the morning , Disp: 90 capsule, Rfl: 3    Dupilumab (Dupixent) 300 MG/2ML SOPN, Inject 300 mg under the skin Once every 2 weeks, Disp: , Rfl:     famotidine (PEPCID) 40 MG tablet, TAKE 1 TABLET BY MOUTH EVERY DAY, Disp: 90 tablet, Rfl: 3    Insulin Degludec-Liraglutide (Xultophy) 100 units-3 6 mg/mL injection pen, Inject 19 units daily  , Disp: 18 mL, Rfl: 1    Insulin Pen Needle (BD Pen Needle Zenaida U/F) 32G X 4 MM MISC, Use 1 daily, Disp: 100 each, Rfl: 2    metoprolol tartrate (LOPRESSOR) 50 mg tablet, Take 1 tablet (50 mg total) by mouth every 12 (twelve) hours, Disp: 180 tablet, Rfl: 3    omega-3-acid ethyl esters (LOVAZA) 1 g capsule, Take 2 capsules (2 g total) by mouth 2 (two) times a day Mail print prescription to pt, Disp: 360 capsule, Rfl: 3    ondansetron (ZOFRAN) 8 mg tablet, Take 1 tablet (8 mg total) by mouth every 8 (eight) hours as needed for nausea or vomiting, Disp: 20 tablet, Rfl: 3    OneTouch Ultra test strip, USE TO TEST BLOOD SUGAR 3 TIMES A DAY, Disp: 100 each, Rfl: 3    potassium chloride (K-DUR,KLOR-CON) 10 mEq tablet, Take 2 tablets (20 mEq total) by mouth daily, Disp: 60 tablet, Rfl: 3    sevelamer carbonate (RENVELA) 800 mg tablet, Take 1 tablet (800 mg total) by mouth in the morning and 1 tablet (800 mg total) at noon and 1 tablet (800 mg total) in the evening  Take with meals  , Disp: 270 tablet, Rfl: 3    simvastatin (ZOCOR) 20 mg tablet, Take 1 tablet (20 mg total) by mouth daily at bedtime, Disp: 90 tablet, Rfl: 3    sodium bicarbonate 650 mg tablet, Take 1 tablet (650 mg total) by mouth 2 (two) times daily after meals, Disp: 180 tablet, Rfl: 3    tamsulosin (FLOMAX) 0 4 mg, TAKE 1 CAPSULE BY MOUTH EVERY DAY WITH DINNER, Disp: 90 capsule, Rfl: 3    torsemide (DEMADEX) 20 mg tablet, Take 2 tablets (40 mg total) by mouth 2 (two) times a day 40mg am and 20mg pm, Disp: 270 tablet, Rfl: 3  Allergies   Allergen Reactions    Invokana [Canagliflozin] Other (See Comments)     Caused circulation problems    Lamisil [Terbinafine] Blisters     Wife states " His skin peeled from head to toe"    Other Swelling     Pomegranate - facial swelling, no swelling of tongue, esophagus  Adhesive tape   Latex Rash   ------------------------    Echo complete w/ contrast if indicated    Result Date: 4/23/2022  Narrative: [facility]  Left Ventricle: Left ventricular cavity size is normal  Wall thickness is mildly increased  The left ventricular ejection fraction is 60%  Systolic function is normal  Wall motion is normal  Diastolic function is normal  There is mild concentric hypertrophy    IVS: There is abnormal septal motion consistent with right ventricular pacing    Left Atrium: The atrium is severely dilated    Right Atrium: The atrium is moderately dilated    Aortic Valve: The aortic valve is trileaflet  The leaflets are moderately thickened  The leaflets are moderately calcified  There is moderately reduced mobility  There is moderate regurgitation  There is mild to moderate stenosis  The aortic valve mean gradient is 18 mmHg  The aortic valve velocity is increased due to stenosis    Mitral Valve: There is moderate thickening  There is moderate calcification  There is moderately reduced mobility  There is moderate to severe annular calcification  There is moderate subvalvular calcification  Chordal calcification is noted   There is mild to moderate regurgitation  There is moderate stenosis  The mitral valve mean gradient is 8mmHg    Tricuspid Valve: There is moderate to severe regurgitation  The right ventricular systolic pressure is moderately to severely elevated    Pulmonic Valve: There is mild regurgitation  ROS  Review of Systems   Constitutional: Positive for fatigue  Negative for activity change, appetite change and unexpected weight change  Respiratory: Positive for shortness of breath  Negative for cough, chest tightness and wheezing  Cardiovascular: Negative for chest pain, palpitations and leg swelling  Gastrointestinal: Positive for constipation  Negative for abdominal pain, blood in stool and diarrhea  Genitourinary: Positive for frequency  Negative for dysuria, hematuria and urgency  Musculoskeletal: Negative for arthralgias, back pain, gait problem and joint swelling  Neurological: Negative for dizziness, speech difficulty, light-headedness, numbness and headaches  Psychiatric/Behavioral: Negative for agitation, behavioral problems, confusion and decreased concentration  Physical Exam:  Vitals:    05/16/22 1352   BP: 114/66   BP Location: Left arm   Patient Position: Sitting   Cuff Size: Adult   Pulse: 69   Weight: 84 6 kg (186 lb 6 4 oz)   Height: 5' 10" (1 778 m)       Physical Exam  Constitutional:       General: He is not in acute distress  Appearance: He is not ill-appearing  HENT:      Head: Normocephalic and atraumatic  Mouth/Throat:      Mouth: Mucous membranes are moist       Pharynx: No oropharyngeal exudate or posterior oropharyngeal erythema  Eyes:      General: No scleral icterus  Neck:      Thyroid: No thyroid mass or thyromegaly  Vascular: Normal carotid pulses  Carotid bruit (transmitted murmur) present  No JVD  Cardiovascular:      Rate and Rhythm: Normal rate and regular rhythm        Pulses:           Dorsalis pedis pulses are 1+ on the right side and 1+ on the left side         Posterior tibial pulses are 2+ on the right side and 2+ on the left side  Heart sounds: Murmur heard  Crescendo decrescendo systolic (basal) murmur is present with a grade of 3/6  No diastolic murmur is present  No friction rub  No gallop  Pulmonary:      Breath sounds: Decreased breath sounds present  No wheezing, rhonchi or rales  Abdominal:      Palpations: Abdomen is soft  There is no hepatomegaly, splenomegaly or mass  Tenderness: There is no abdominal tenderness  Musculoskeletal:         General: Swelling (trace to +1 in LEs) and deformity (kyphosis) present  Cervical back: Neck supple  Skin:     General: Skin is warm  Coloration: Skin is pale  Skin is not jaundiced  Findings: No bruising, erythema, lesion or rash  Neurological:      General: No focal deficit present  Mental Status: He is alert and oriented to person, place, and time  Cranial Nerves: No cranial nerve deficit  Sensory: No sensory deficit  Motor: No weakness  Psychiatric:         Mood and Affect: Mood normal          Behavior: Behavior normal          Thought Content: Thought content normal          Judgment: Judgment normal          Discussion/Summary:  1  Chronic diastolic heart failure with large nephrogenic component  Patient will be having fistula placed  Currently on torsemide 60 mg daily in divided dosages  On potassium 20 mEq daily  Appears relatively compensated on this regimen  2  Hypertension with renal disease  Blood pressure excellent on amlodipine 10 mg daily metoprolol 50 mg b i d   3  Moderate mitral stenosis with mild-to-moderate mitral regurgitation  Stable findings from previous echo  4  Aortic stenosis mild-to-moderate moderate aortic regurgitation  Also stable from prior echo  5  Paroxysmal atrial fibrillation and flutter with more persistent atrial flutter recently on interrogations    Rate well controlled on metoprolol 50 mg b i d   On Eliquis 5 mg b i d  For stroke prevention  6  Sick sinus syndrome with permanent pacemaker in Situ  Normally functioning device on interrogations  7  Hyperlipidemia  Patient on simvastatin    LDL cholesterol 57 with HDL cholesterol 29 triglycerides 75 when checked last summer  8  Preoperative clearance for advance renal disease  Patient appears to be somewhat increased but not high risk for upcoming procedure  He appears compensated in terms of his heart failure  His atrial flutter rate is well controlled  Recommend stopping Eliquis 2 days before the procedure  The last dose of Eliquis should be the p m  Of 5/29/22  This can be resumed at the discretion of the surgeon afterwards  Also recommend placing a magnet over the patient's pacemaker  FU with associate  3 months-prefers FU closer to home    Dr Beverly Bravo MD

## 2022-05-16 NOTE — TELEPHONE ENCOUNTER
Case has been rescheduled to 6/1/22 in [facility] with Dr Taryn Carmona  Pt was cleared by Dr Vito Wilkins today at office visit

## 2022-05-17 ENCOUNTER — HOSPITAL ENCOUNTER (OUTPATIENT)
Dept: INFUSION CENTER | Facility: HOSPITAL | Age: 67
Discharge: HOME | End: 2022-05-17
Attending: INTERNAL MEDICINE
Payer: MEDICARE

## 2022-05-17 VITALS
RESPIRATION RATE: 18 BRPM | DIASTOLIC BLOOD PRESSURE: 61 MMHG | BODY MASS INDEX: 26.89 KG/M2 | HEIGHT: 70 IN | WEIGHT: 187.83 LBS | HEART RATE: 85 BPM | SYSTOLIC BLOOD PRESSURE: 122 MMHG | TEMPERATURE: 97.1 F

## 2022-05-17 DIAGNOSIS — C18.7 MALIGNANT NEOPLASM OF SIGMOID COLON (HCC): Primary | ICD-10-CM

## 2022-05-17 DIAGNOSIS — T45.1X5A CHEMOTHERAPY-INDUCED NEUTROPENIA (HCC): ICD-10-CM

## 2022-05-17 DIAGNOSIS — D70.1 CHEMOTHERAPY-INDUCED NEUTROPENIA (HCC): ICD-10-CM

## 2022-05-17 PROCEDURE — 96366 THER/PROPH/DIAG IV INF ADDON: CPT

## 2022-05-17 PROCEDURE — 96409 CHEMO IV PUSH SNGL DRUG: CPT

## 2022-05-17 PROCEDURE — 96367 TX/PROPH/DG ADDL SEQ IV INF: CPT

## 2022-05-17 RX ORDER — SODIUM CHLORIDE 9 MG/ML
20 INJECTION, SOLUTION INTRAVENOUS ONCE AS NEEDED
Status: DISCONTINUED | OUTPATIENT
Start: 2022-05-17 | End: 2022-05-21 | Stop reason: HOSPADM

## 2022-05-17 RX ORDER — DEXTROSE MONOHYDRATE 50 MG/ML
20 INJECTION, SOLUTION INTRAVENOUS ONCE
Status: DISCONTINUED | OUTPATIENT
Start: 2022-05-17 | End: 2022-05-21 | Stop reason: HOSPADM

## 2022-05-17 RX ORDER — FLUOROURACIL 50 MG/ML
275 INJECTION, SOLUTION INTRAVENOUS ONCE
Status: COMPLETED | OUTPATIENT
Start: 2022-05-17 | End: 2022-05-17

## 2022-05-17 RX ADMIN — DEXAMETHASONE SODIUM PHOSPHATE: 10 INJECTION, SOLUTION INTRAMUSCULAR; INTRAVENOUS at 11:47

## 2022-05-17 RX ADMIN — FLUOROURACIL 555 MG: 50 INJECTION, SOLUTION INTRAVENOUS at 14:49

## 2022-05-17 RX ADMIN — SODIUM CHLORIDE 20 ML/HR: 0.9 INJECTION, SOLUTION INTRAVENOUS at 11:45

## 2022-05-17 RX ADMIN — LEUCOVORIN CALCIUM 550 MG: 500 INJECTION, POWDER, LYOPHILIZED, FOR SOLUTION INTRAMUSCULAR; INTRAVENOUS at 12:31

## 2022-05-20 DIAGNOSIS — E11.22 TYPE 2 DIABETES MELLITUS WITH STAGE 4 CHRONIC KIDNEY DISEASE, WITH LONG-TERM CURRENT USE OF INSULIN (HCC): ICD-10-CM

## 2022-05-20 DIAGNOSIS — Z79.4 TYPE 2 DIABETES MELLITUS WITH STAGE 4 CHRONIC KIDNEY DISEASE, WITH LONG-TERM CURRENT USE OF INSULIN (HCC): ICD-10-CM

## 2022-05-20 DIAGNOSIS — N18.4 TYPE 2 DIABETES MELLITUS WITH STAGE 4 CHRONIC KIDNEY DISEASE, WITH LONG-TERM CURRENT USE OF INSULIN (HCC): ICD-10-CM

## 2022-05-20 RX ORDER — (INSULIN DEGLUDEC AND LIRAGLUTIDE) 100; 3.6 [IU]/ML; MG/ML
INJECTION, SOLUTION SUBCUTANEOUS
Qty: 18 ML | Refills: 0 | Status: SHIPPED | OUTPATIENT
Start: 2022-05-20

## 2022-05-23 ENCOUNTER — APPOINTMENT (OUTPATIENT)
Dept: LAB | Facility: CLINIC | Age: 67
End: 2022-05-23
Payer: MEDICARE

## 2022-05-23 DIAGNOSIS — C18.7 MALIGNANT NEOPLASM OF SIGMOID COLON (HCC): ICD-10-CM

## 2022-05-23 DIAGNOSIS — D64.9 NORMOCYTIC ANEMIA: ICD-10-CM

## 2022-05-23 LAB
ALBUMIN SERPL BCP-MCNC: 3 G/DL (ref 3.5–5)
ALP SERPL-CCNC: 124 U/L (ref 46–116)
ALT SERPL W P-5'-P-CCNC: 30 U/L (ref 12–78)
ANION GAP SERPL CALCULATED.3IONS-SCNC: 8 MMOL/L (ref 4–13)
AST SERPL W P-5'-P-CCNC: 18 U/L (ref 5–45)
BASOPHILS # BLD AUTO: 0.07 THOUSANDS/ΜL (ref 0–0.1)
BASOPHILS NFR BLD AUTO: 1 % (ref 0–1)
BILIRUB SERPL-MCNC: 1.11 MG/DL (ref 0.2–1)
BUN SERPL-MCNC: 39 MG/DL (ref 5–25)
CALCIUM ALBUM COR SERPL-MCNC: 10.2 MG/DL (ref 8.3–10.1)
CALCIUM SERPL-MCNC: 9.4 MG/DL (ref 8.3–10.1)
CHLORIDE SERPL-SCNC: 103 MMOL/L (ref 100–108)
CO2 SERPL-SCNC: 25 MMOL/L (ref 21–32)
CREAT SERPL-MCNC: 3.27 MG/DL (ref 0.6–1.3)
EOSINOPHIL # BLD AUTO: 0.52 THOUSAND/ΜL (ref 0–0.61)
EOSINOPHIL NFR BLD AUTO: 4 % (ref 0–6)
ERYTHROCYTE [DISTWIDTH] IN BLOOD BY AUTOMATED COUNT: 17.2 % (ref 11.6–15.1)
FERRITIN SERPL-MCNC: 890 NG/ML (ref 8–388)
GFR SERPL CREATININE-BSD FRML MDRD: 18 ML/MIN/1.73SQ M
GLUCOSE P FAST SERPL-MCNC: 128 MG/DL (ref 65–99)
HCT VFR BLD AUTO: 32.6 % (ref 36.5–49.3)
HGB BLD-MCNC: 10.8 G/DL (ref 12–17)
IMM GRANULOCYTES # BLD AUTO: 0.26 THOUSAND/UL (ref 0–0.2)
IMM GRANULOCYTES NFR BLD AUTO: 2 % (ref 0–2)
IRON SATN MFR SERPL: 23 % (ref 20–50)
IRON SERPL-MCNC: 65 UG/DL (ref 65–175)
LYMPHOCYTES # BLD AUTO: 1.23 THOUSANDS/ΜL (ref 0.6–4.47)
LYMPHOCYTES NFR BLD AUTO: 9 % (ref 14–44)
MCH RBC QN AUTO: 32 PG (ref 26.8–34.3)
MCHC RBC AUTO-ENTMCNC: 33.1 G/DL (ref 31.4–37.4)
MCV RBC AUTO: 97 FL (ref 82–98)
MONOCYTES # BLD AUTO: 0.87 THOUSAND/ΜL (ref 0.17–1.22)
MONOCYTES NFR BLD AUTO: 6 % (ref 4–12)
NEUTROPHILS # BLD AUTO: 11.28 THOUSANDS/ΜL (ref 1.85–7.62)
NEUTS SEG NFR BLD AUTO: 78 % (ref 43–75)
NRBC BLD AUTO-RTO: 0 /100 WBCS
PLATELET # BLD AUTO: 254 THOUSANDS/UL (ref 149–390)
PMV BLD AUTO: 10 FL (ref 8.9–12.7)
POTASSIUM SERPL-SCNC: 4.6 MMOL/L (ref 3.5–5.3)
PROT SERPL-MCNC: 7.1 G/DL (ref 6.4–8.2)
RBC # BLD AUTO: 3.37 MILLION/UL (ref 3.88–5.62)
SODIUM SERPL-SCNC: 136 MMOL/L (ref 136–145)
TIBC SERPL-MCNC: 282 UG/DL (ref 250–450)
WBC # BLD AUTO: 14.23 THOUSAND/UL (ref 4.31–10.16)

## 2022-05-23 PROCEDURE — 82728 ASSAY OF FERRITIN: CPT

## 2022-05-23 PROCEDURE — 83540 ASSAY OF IRON: CPT

## 2022-05-23 PROCEDURE — 80053 COMPREHEN METABOLIC PANEL: CPT

## 2022-05-23 PROCEDURE — 83550 IRON BINDING TEST: CPT

## 2022-05-23 PROCEDURE — 85025 COMPLETE CBC W/AUTO DIFF WBC: CPT

## 2022-05-23 NOTE — PRE-PROCEDURE INSTRUCTIONS
Pre-Surgery Instructions:   Medication Instructions    allopurinol (ZYLOPRIM) 300 mg tablet Take day of surgery   amLODIPine (NORVASC) 10 mg tablet Take day of surgery   b complex-vitamin C-folic acid (NEPHROCAPS) 1 mg capsule Hold day of surgery   Dupilumab (Dupixent) 300 MG/2ML SOPN prior week    famotidine (PEPCID) 40 MG tablet Take day of surgery   Insulin Degludec-Liraglutide (Xultophy) 100 units-3 6 mg/mL injection pen Take day of surgery   metoprolol tartrate (LOPRESSOR) 50 mg tablet Take day of surgery   omega-3-acid ethyl esters (LOVAZA) 1 g capsule Take night before surgery    ondansetron (ZOFRAN) 8 mg tablet does not use    potassium chloride (K-DUR,KLOR-CON) 10 mEq tablet Hold day of surgery   sevelamer carbonate (RENVELA) 800 mg tablet Take night before surgery    simvastatin (ZOCOR) 20 mg tablet Take day of surgery   sodium bicarbonate 650 mg tablet Hold day of surgery   tamsulosin (FLOMAX) 0 4 mg Take day of surgery   torsemide (DEMADEX) 20 mg tablet Hold day of surgery  Pre procedure instructions given, verbalizes understanding  NPO after MN  Bathing reviewed  Confirmed LD Eliquis 5/29 pm per CC  Perm a cath in place  Wife present for interview   No NSAIDS

## 2022-05-24 ENCOUNTER — HOSPITAL ENCOUNTER (OUTPATIENT)
Dept: INFUSION CENTER | Facility: HOSPITAL | Age: 67
Discharge: HOME | End: 2022-05-24
Attending: INTERNAL MEDICINE
Payer: MEDICARE

## 2022-05-24 VITALS
BODY MASS INDEX: 27.24 KG/M2 | HEART RATE: 89 BPM | RESPIRATION RATE: 18 BRPM | SYSTOLIC BLOOD PRESSURE: 108 MMHG | WEIGHT: 190.26 LBS | HEIGHT: 70 IN | DIASTOLIC BLOOD PRESSURE: 54 MMHG | OXYGEN SATURATION: 98 % | TEMPERATURE: 96.6 F

## 2022-05-24 DIAGNOSIS — C18.7 MALIGNANT NEOPLASM OF SIGMOID COLON (HCC): Primary | ICD-10-CM

## 2022-05-24 DIAGNOSIS — D70.1 CHEMOTHERAPY-INDUCED NEUTROPENIA (HCC): ICD-10-CM

## 2022-05-24 DIAGNOSIS — T45.1X5A CHEMOTHERAPY-INDUCED NEUTROPENIA (HCC): ICD-10-CM

## 2022-05-24 PROCEDURE — 96409 CHEMO IV PUSH SNGL DRUG: CPT

## 2022-05-24 PROCEDURE — 96366 THER/PROPH/DIAG IV INF ADDON: CPT

## 2022-05-24 PROCEDURE — 96367 TX/PROPH/DG ADDL SEQ IV INF: CPT

## 2022-05-24 RX ORDER — SODIUM CHLORIDE 9 MG/ML
20 INJECTION, SOLUTION INTRAVENOUS ONCE AS NEEDED
Status: DISCONTINUED | OUTPATIENT
Start: 2022-05-24 | End: 2022-05-28 | Stop reason: HOSPADM

## 2022-05-24 RX ORDER — DEXTROSE MONOHYDRATE 50 MG/ML
20 INJECTION, SOLUTION INTRAVENOUS ONCE
Status: DISCONTINUED | OUTPATIENT
Start: 2022-05-24 | End: 2022-05-28 | Stop reason: HOSPADM

## 2022-05-24 RX ORDER — FLUOROURACIL 50 MG/ML
275 INJECTION, SOLUTION INTRAVENOUS ONCE
Status: COMPLETED | OUTPATIENT
Start: 2022-05-24 | End: 2022-05-24

## 2022-05-24 RX ADMIN — FLUOROURACIL 555 MG: 50 INJECTION, SOLUTION INTRAVENOUS at 14:50

## 2022-05-24 RX ADMIN — LEUCOVORIN CALCIUM 550 MG: 500 INJECTION, POWDER, LYOPHILIZED, FOR SOLUTION INTRAMUSCULAR; INTRAVENOUS at 12:45

## 2022-05-24 RX ADMIN — SODIUM CHLORIDE 20 ML/HR: 0.9 INJECTION, SOLUTION INTRAVENOUS at 12:02

## 2022-05-24 RX ADMIN — DEXAMETHASONE SODIUM PHOSPHATE: 10 INJECTION, SOLUTION INTRAMUSCULAR; INTRAVENOUS at 12:07

## 2022-05-24 NOTE — PLAN OF CARE
Problem: INFECTION - ADULT  Goal: Absence or prevention of progression during hospitalization  Description: INTERVENTIONS:  - Assess and monitor for signs and symptoms of infection  - Monitor lab/diagnostic results  - Monitor all insertion sites, i e  indwelling lines, tubes, and drains  - Monitor endotracheal if appropriate and nasal secretions for changes in amount and color  - Chilo appropriate cooling/warming therapies per order  - Administer medications as ordered  - Instruct and encourage patient and family to use good hand hygiene technique  - Identify and instruct in appropriate isolation precautions for identified infection/condition  Outcome: Progressing     Problem: Knowledge Deficit  Goal: Patient/family/caregiver demonstrates understanding of disease process, treatment plan, medications, and discharge instructions  Description: Complete learning assessment and assess knowledge base    Interventions:  - Provide teaching at level of understanding  - Provide teaching via preferred learning methods  Outcome: Progressing

## 2022-05-26 ENCOUNTER — OFFICE VISIT (OUTPATIENT)
Dept: HEMATOLOGY ONCOLOGY | Facility: CLINIC | Age: 67
End: 2022-05-26
Payer: MEDICARE

## 2022-05-26 ENCOUNTER — TELEPHONE (OUTPATIENT)
Dept: HEMATOLOGY ONCOLOGY | Facility: CLINIC | Age: 67
End: 2022-05-26

## 2022-05-26 VITALS
HEIGHT: 70 IN | BODY MASS INDEX: 27.63 KG/M2 | OXYGEN SATURATION: 94 % | WEIGHT: 193 LBS | DIASTOLIC BLOOD PRESSURE: 76 MMHG | TEMPERATURE: 97.8 F | RESPIRATION RATE: 18 BRPM | HEART RATE: 83 BPM | SYSTOLIC BLOOD PRESSURE: 136 MMHG

## 2022-05-26 DIAGNOSIS — T45.1X5A CHEMOTHERAPY-INDUCED NEUTROPENIA (HCC): Primary | ICD-10-CM

## 2022-05-26 DIAGNOSIS — D70.1 CHEMOTHERAPY-INDUCED NEUTROPENIA (HCC): Primary | ICD-10-CM

## 2022-05-26 DIAGNOSIS — D64.9 NORMOCYTIC ANEMIA: ICD-10-CM

## 2022-05-26 DIAGNOSIS — C18.7 MALIGNANT NEOPLASM OF SIGMOID COLON (HCC): ICD-10-CM

## 2022-05-26 PROCEDURE — 99214 OFFICE O/P EST MOD 30 MIN: CPT | Performed by: INTERNAL MEDICINE

## 2022-05-26 RX ORDER — FLUOROURACIL 50 MG/ML
300 INJECTION, SOLUTION INTRAVENOUS ONCE
OUTPATIENT
Start: 2022-07-12

## 2022-05-26 RX ORDER — FLUOROURACIL 50 MG/ML
300 INJECTION, SOLUTION INTRAVENOUS ONCE
Status: CANCELLED | OUTPATIENT
Start: 2022-07-05

## 2022-05-26 RX ORDER — SODIUM CHLORIDE 9 MG/ML
20 INJECTION, SOLUTION INTRAVENOUS ONCE AS NEEDED
OUTPATIENT
Start: 2022-07-12

## 2022-05-26 RX ORDER — FLUOROURACIL 50 MG/ML
300 INJECTION, SOLUTION INTRAVENOUS ONCE
Status: CANCELLED | OUTPATIENT
Start: 2022-06-21

## 2022-05-26 RX ORDER — DEXTROSE MONOHYDRATE 50 MG/ML
20 INJECTION, SOLUTION INTRAVENOUS ONCE
Status: CANCELLED | OUTPATIENT
Start: 2022-06-14

## 2022-05-26 RX ORDER — SODIUM CHLORIDE 9 MG/ML
20 INJECTION, SOLUTION INTRAVENOUS ONCE AS NEEDED
Status: CANCELLED | OUTPATIENT
Start: 2022-06-14

## 2022-05-26 RX ORDER — SODIUM CHLORIDE 9 MG/ML
20 INJECTION, SOLUTION INTRAVENOUS ONCE AS NEEDED
Status: CANCELLED | OUTPATIENT
Start: 2022-07-05

## 2022-05-26 RX ORDER — SODIUM CHLORIDE 9 MG/ML
20 INJECTION, SOLUTION INTRAVENOUS ONCE AS NEEDED
Status: CANCELLED | OUTPATIENT
Start: 2022-06-28

## 2022-05-26 RX ORDER — FLUOROURACIL 50 MG/ML
300 INJECTION, SOLUTION INTRAVENOUS ONCE
Status: CANCELLED | OUTPATIENT
Start: 2022-06-28

## 2022-05-26 RX ORDER — DEXTROSE MONOHYDRATE 50 MG/ML
20 INJECTION, SOLUTION INTRAVENOUS ONCE
Status: CANCELLED | OUTPATIENT
Start: 2022-07-05

## 2022-05-26 RX ORDER — DEXTROSE MONOHYDRATE 50 MG/ML
20 INJECTION, SOLUTION INTRAVENOUS ONCE
Status: CANCELLED | OUTPATIENT
Start: 2022-06-28

## 2022-05-26 RX ORDER — DEXTROSE MONOHYDRATE 50 MG/ML
20 INJECTION, SOLUTION INTRAVENOUS ONCE
OUTPATIENT
Start: 2022-07-12

## 2022-05-26 RX ORDER — SODIUM CHLORIDE 9 MG/ML
20 INJECTION, SOLUTION INTRAVENOUS ONCE AS NEEDED
Status: CANCELLED | OUTPATIENT
Start: 2022-06-21

## 2022-05-26 RX ORDER — DEXTROSE MONOHYDRATE 50 MG/ML
20 INJECTION, SOLUTION INTRAVENOUS ONCE
Status: CANCELLED | OUTPATIENT
Start: 2022-06-21

## 2022-05-26 RX ORDER — FLUOROURACIL 50 MG/ML
300 INJECTION, SOLUTION INTRAVENOUS ONCE
Status: CANCELLED | OUTPATIENT
Start: 2022-06-14

## 2022-05-26 NOTE — PROGRESS NOTES
Hematology/Oncology Outpatient Follow-up  Carl Jeter 74 y o  male 1955 [id number]    Date:  5/26/2022        Assessment and Plan:  1  Chemotherapy-induced neutropenia (HCC)  His white cell count is elevated which seems to be a chronic process  There is no need for pegylated G-CSF  2  Malignant neoplasm of sigmoid colon ([address] )  The patient is tolerating the current dose of the of 5 fluorouracil/leucovorin IV push weekly 5 weeks on and 1 week off  He received so far 2 cycles  We will aim to increase the dose of the chemotherapy up to 300 mg/m2 since he is doing relatively well on the current dose  The patient is in the process of getting a fistula created by the vascular surgical team   We will do day the start of cycle 3 until 06/14/2022     - CBC and differential; Future  - Comprehensive metabolic panel; Future  - Magnesium; Future  - C-reactive protein; Future  - Sedimentation rate, automated; Future  - LD,Blood; Future  - CEA; Future    3  Normocytic anemia  He seems to have stable normocytic anemia which is most likely multifactorial etiology including end-stage renal disease  He does not seem to be iron deficient  He did not get IV Venofer recently  HPI:  The patient came today for follow-up visit accompanied by his wife  He continues to be on hemodialysis 3 days a week  He is in the process of getting a fistula created by the vascular surgical team next week to be used for the hemodialysis  The patient tolerated the chemotherapy relatively well  His blood work on 05/23/2022 showed white cell count of 14 2 with hemoglobin of 10 3 platelet count 385  ANC 11   Creatinine 3 2 with the corrected calcium of 10 2  Liver enzymes were within normal range  Iron panel showed saturation 23% ferritin 890    Oncology History Overview Note   Patient with multiple comorbid conditions including CHF, advanced chronic kidney disease, diabetes mellitus, arthritis, sleep apnea, etc   He apparently had multiple colonoscopies 2021  He was found to have adenocarcinoma in the sigmoid region rising from tubular adenoma on 01/08/2021  He had a PET-CT scan on 02/19/2021 which showed uptake in the sigmoid colon otherwise no finding for hypermetabolic metastasis was found  The patient had 2 other colonoscopies and finally had his laparoscopic surgery on 10/21/2021 which showed showed residual adenocarcinoma moderately differentiated arising in the tubular adenoma with high-grade dysplasia, pT3 with 3/16 lymph node involvement  All margins were negative without obvious lymphovascular or perineural invasion  The patient subsequently on 11/24/2021 had a CT scan of the chest abdomen pelvis and IV contrast was given by mistake which show resulted in significant worsening of his kidney function with creatinine around 7  The he required hospitalization  His baseline creatinine level is around 3  The CT scan on 11/24/2021 showed 1 new cm nodule ground-glass density in the superior segment of the right lower lobe which was thought to be inflammatory versus neoplastic  There are 2 areas of heterogeneous attenuation also in the abdominal area of unknown significance  Follow-up in 3 months was suggested  PET/CT 1/4/22: IMPRESSION:  1   1 0 cm groundglass right lower lobe lung nodules seen on CT of chest dated 11/24/2021 is not definitively visualized on the current exam and likely has resolved although evaluation of the lung fields is limited secondary to respiratory motion   artifact  There is no focal FDG activity in the chest characteristic of hypermetabolic malignancy  Given limitations, short interval follow-up with CT of chest in 3 months is recommended to document resolution    2   Multifocal peritoneal hypermetabolic soft tissue foci involving the left mid abdomen and extending along the left paracolic gutter to the central lower abdomen/pelvis, most of which appears to be associated with internal attenuation and is most   suggestive of fat necrosis with underlying peritoneal carcinomatosis not excluded, particularly in the anterior left mid abdomen  As recommended previously, short interval follow-up with imaging in 3 months is recommended to ensure stability and exclude   developing peritoneal carcinomatosis  3   Moderate bilateral pleural effusions  4   Stable in size minimally FDG avid mediastinal/right hilar/left inguinal lymph nodes of uncertain clinical significance but most suggestive of a benign inflammatory process  Attention to these regions on follow-up scans is recommended  5   Air-fluid levels within the bilateral maxillary sinuses for which correlation is recommended to exclude acute sinusitis  He was started on the FOLFOX regimen without the oxaliplatin 1/2022  He was educated he will be educated extensively about the chemotherapy and the potential side effects including renal failure/hemodialysis risk  Patient did not tolerate the 1st cycle of chemotherapy well at all which resulted in hospitalization lesion and need for dialysis x2 treatments  He sought 2nd opinion in network with 1 of our colleagues [facility]  Decision was made to trial 5 FU/Leucovorin at 50% of the usual dose (250mg/m2) via IV push weekly 5 weeks on with 1 week of a break which can be adjusted/titrated slowly according to patient's tolerance        Colon cancer ([facility] [address] )   10/23/2021 Initial Diagnosis    Colon cancer ([facility] [address] )     1/18/2022 - 1/20/2022 Chemotherapy    fluorouracil (ADRUCIL), 400 mg/m2 = 830 mg, Intravenous, Once, 1 of 1 cycle  Administration: 830 mg (1/18/2022)  leucovorin calcium IVPB, 828 mg, Intravenous, Once, 1 of 12 cycles  Administration: 800 mg (1/18/2022)  fluorouracil (ADRUCIL) ambulatory infusion Soln, 1,200 mg/m2/day = 4,970 mg, Intravenous, Over 46 hours, 1 of 12 cycles  Dose modification: 800 mg/m2/day (original dose 1,200 mg/m2/day, Cycle 2, Reason: Other (Must fill in a comment), Comment:  choice )     3/7/2022 -  Chemotherapy    fluorouracil (ADRUCIL), 515 mg (62 5 % of original dose 400 mg/m2), Intravenous, Once, 2 of 12 cycles  Dose modification: 250 mg/m2 (original dose 400 mg/m2, Cycle 1, Reason: Dose modified as per discussion with consulting physician), 275 mg/m2 (original dose 400 mg/m2, Cycle 2, Reason: Max Dose Reached)  Administration: 500 mg (3/7/2022), 500 mg (3/14/2022), 500 mg (3/21/2022), 500 mg (3/28/2022), 500 mg (4/4/2022), 565 mg (4/18/2022), 555 mg (5/3/2022), 555 mg (5/10/2022), 555 mg (5/17/2022)  leucovorin calcium IVPB, 515 mg (62 5 % of original dose 400 mg/m2), Intravenous, Once, 2 of 12 cycles  Dose modification: 250 mg/m2 (original dose 400 mg/m2, Cycle 1, Reason: Dose modified as per discussion with consulting physician), 275 mg/m2 (original dose 400 mg/m2, Cycle 2, Reason: Max Dose Reached)  Administration: 500 mg (3/7/2022), 500 mg (3/14/2022), 500 mg (3/21/2022), 500 mg (3/28/2022), 500 mg (4/4/2022), 550 mg (4/18/2022), 550 mg (5/3/2022), 550 mg (5/10/2022), 550 mg (5/17/2022)         Interval history:    ROS: Review of Systems   Constitutional: Positive for fatigue  Negative for chills and fever  HENT: Negative for ear pain and sore throat  Eyes: Negative for pain and visual disturbance  Respiratory: Positive for cough and shortness of breath  Cardiovascular: Negative for chest pain and palpitations  Gastrointestinal: Positive for constipation  Negative for abdominal pain and vomiting  Genitourinary: Negative for dysuria and hematuria  Musculoskeletal: Negative for arthralgias and back pain  Skin: Negative for color change and rash  Neurological: Positive for numbness  Negative for seizures and syncope  Hematological: Bruises/bleeds easily  All other systems reviewed and are negative        Past Medical History:   Diagnosis Date    Anemia     iron def    Arthritis     OA    Bruise of both arms     forearms and both hands    Bruises easily  Cancer ([facility] ) 09/01/2021    colon    CH[facility]congestive [facility] failure) [facility]     Chronic kidney disease     CPAP (continuous positive airway pressure) dependence     Diabetes mellitus ([facility] )     Diabetic foot ulcer ([facility] )     Eczema     Erectile dysfunction     Gout     Heart murmur     History of permanent cardiac pacemaker placement 11/2018    History of transfusion     Hyperlipidemia     Hypertension     Hypoglycemia 03/08/2019    Murmur     Nephropathy     Osteoarthritis     Pacemaker 11/06/2018    PAD (peripheral artery disease) ([facility])     Seasonal allergies     Sleep apnea     Toe infection     bilat great toes    Vertigo     Walks frequently     Wears dentures     upper    Wears glasses        Past Surgical History:   Procedure Laterality Date    CARDIAC PACEMAKER PLACEMENT      CARPAL TUNNEL RELEASE Left     CARPAL TUNNEL RELEASE Right     CARPAL TUNNEL RELEASE      COLONOSCOPY  08/2020    COLONOSCOPY      FL GUIDED CENTRAL VENOUS ACCESS DEVICE INSERTION  01/11/2022    FOOT AMPUTATION Left 02/10/2020    Procedure: PARTIAL 1ST RAY AMPUTATION;  Surgeon: Rafita Murcia DPM;  [facility];  Service: Podiatry    INCISION AND DRAINAGE OF WOUND Left 01/12/2020    Procedure: INCISION AND DRAINAGE (I&D) EXTREMITY AND REMOVAL OF SESMOID BONE;  Surgeon: Demi Gill DPM;  [facility];  Service: Podiatry    IR OTHER  01/21/2022    IR PICC REPOSITION  01/14/2020    IR TUNNELED DIALYSIS CATHETER PLACEMENT  04/26/2022    KNEE ARTHROSCOPY Left     KNEE ARTHROSCOPY Right     KNEE SURGERY      WA AMPUTATION TOE,I-P JT Bilateral 05/02/2017    Procedure: PARTIAL AMPUTATION RIGHT AND LEFT HALLUX ;  Surgeon: Rafita uMrcia DPM;  [facility];  Service: Podiatry    WA AMPUTATION TOE,MT-P JT Left 07/25/2017    Procedure: 2ND TOE AMPUTATION;  Surgeon: Rafita Murcia DPM;  [facility];  Service: Podiatry    WA INSJ TUNNELED CTR VAD W/SUBQ PORT AGE 5 YR/> N/A 2022    Procedure: INSERTION VENOUS PORT ( PORT-A-CATH) IR;  Surgeon: Holley Aguilar DO;  [facility];  Service: Interventional Radiology    RESECTION LOW ANTERIOR LAPAROSCOPIC N/A 10/21/2021    Procedure: RESECTION LOW ANTERIOR LAPAROSCOPIC;  Surgeon: Vianney Fu MD;  [facility];  Service: Colorectal    SHOULDER ARTHROSCOPY Left     with screws,RTC    SHOULDER SURGERY Left     rotator cuff    TOE AMPUTATION Left 2020    Procedure: Sean Kevin;  Surgeon: Pastor Lili DPM;  [facility];  Service: Podiatry    UPPER GASTROINTESTINAL ENDOSCOPY  2020    Intestinal metaplasia prepyloric    VASECTOMY         Social History     Socioeconomic History    Marital status: /Civil Union     Spouse name: None    Number of children: None    Years of education: None    Highest education level: None   Occupational History    None   Tobacco Use    Smoking status: Former Smoker     Packs/day: 0 50     Years: 20 00     Pack years: 10 00     Types: Cigarettes     Start date:      Quit date:      Years since quittin 4    Smokeless tobacco: Never Used    Tobacco comment: quit 10 years ago   Vaping Use    Vaping Use: Never used   Substance and Sexual Activity    Alcohol use: Never     Alcohol/week: 0 0 standard drinks    Drug use: No    Sexual activity: Not Currently     Partners: Female   Other Topics Concern    None   Social History Narrative    ** Merged History Encounter **         Daily cola consumption (2 cans/day)     Social Determinants of Health     Financial Resource Strain: Not on file   Food Insecurity: No Food Insecurity    Worried About Running Out of Food in the Last Year: Never true    Joel of Food in the Last Year: Never true   Transportation Needs: No Transportation Needs    Lack of Transportation (Medical): No    Lack of Transportation (Non-Medical):  No   Physical Activity: Not on file   Stress: Not on file Social Connections: Not on file   Intimate Partner Violence: Not on file   Housing Stability: [address] Unable to Pay for Housing in the Last Year: No    Number of Places Lived in the Last Year: 1    Unstable Housing in the Last Year: No       Family History   Problem Relation Age of Onset    Heart disease Father         passed away    Hypertension Father     Pulmonary embolism Father     Hypertension Mother         assed away       Allergies   Allergen Reactions    Invokana [Canagliflozin] Other (See Comments)     Caused circulation problems    Lamisil [Terbinafine] Blisters     Wife states " His skin peeled from head to toe"    Other Swelling     Pomegranate - facial swelling, no swelling of tongue, esophagus  Adhesive tape   Latex Rash         Current Outpatient Medications:     allopurinol (ZYLOPRIM) 300 mg tablet, Take 1 tablet (300 mg total) by mouth every morning, Disp: 90 tablet, Rfl: 0    amLODIPine (NORVASC) 10 mg tablet, Take 1 tablet (10 mg total) by mouth daily, Disp: 90 tablet, Rfl: 3    apixaban (ELIQUIS) 5 mg, Take 1 tablet (5 mg total) by mouth every 12 (twelve) hours (Patient taking differently: Take by mouth every 12 (twelve) hours), Disp: 180 tablet, Rfl: 3    b complex-vitamin C-folic acid (NEPHROCAPS) 1 mg capsule, Take 1 capsule by mouth in the morning , Disp: 90 capsule, Rfl: 3    Dupilumab (Dupixent) 300 MG/2ML SOPN, Inject 300 mg under the skin Once every 2 weeks, Disp: , Rfl:     famotidine (PEPCID) 40 MG tablet, TAKE 1 TABLET BY MOUTH EVERY DAY, Disp: 90 tablet, Rfl: 3    Insulin Degludec-Liraglutide (Xultophy) 100 units-3 6 mg/mL injection pen, Inject 19 units daily  , Disp: 18 mL, Rfl: 0    Insulin Pen Needle (BD Pen Needle Zenaida U/F) 32G X 4 MM MISC, Use 1 daily, Disp: 100 each, Rfl: 2    metoprolol tartrate (LOPRESSOR) 50 mg tablet, Take 1 tablet (50 mg total) by mouth every 12 (twelve) hours, Disp: 180 tablet, Rfl: 3    omega-3-acid ethyl esters (LOVAZA) 1 g capsule, Take 2 capsules (2 g total) by mouth 2 (two) times a day Mail print prescription to pt, Disp: 360 capsule, Rfl: 3    OneTouch Ultra test strip, USE TO TEST BLOOD SUGAR 3 TIMES A DAY, Disp: 100 each, Rfl: 3    potassium chloride (K-DUR,KLOR-CON) 10 mEq tablet, Take 2 tablets (20 mEq total) by mouth daily, Disp: 60 tablet, Rfl: 3    sevelamer carbonate (RENVELA) 800 mg tablet, Take 1 tablet (800 mg total) by mouth in the morning and 1 tablet (800 mg total) at noon and 1 tablet (800 mg total) in the evening  Take with meals  , Disp: 270 tablet, Rfl: 3    simvastatin (ZOCOR) 20 mg tablet, Take 1 tablet (20 mg total) by mouth daily at bedtime, Disp: 90 tablet, Rfl: 3    sodium bicarbonate 650 mg tablet, Take 1 tablet (650 mg total) by mouth 2 (two) times daily after meals, Disp: 180 tablet, Rfl: 3    tamsulosin (FLOMAX) 0 4 mg, TAKE 1 CAPSULE BY MOUTH EVERY DAY WITH DINNER, Disp: 90 capsule, Rfl: 3    torsemide (DEMADEX) 20 mg tablet, Take 2 tablets (40 mg total) by mouth 2 (two) times a day 40mg am and 20mg pm, Disp: 270 tablet, Rfl: 3    ondansetron (ZOFRAN) 8 mg tablet, Take 1 tablet (8 mg total) by mouth every 8 (eight) hours as needed for nausea or vomiting (Patient not taking: Reported on 5/26/2022), Disp: 20 tablet, Rfl: 3  No current facility-administered medications for this visit  Facility-Administered Medications Ordered in Other Visits:     dextrose 5 % infusion, 20 mL/hr, Intravenous, Once, Ramya Forbes MD    sodium chloride 0 9 % infusion, 20 mL/hr, Intravenous, Once PRN, Ramya Forbes MD, Stopped at 05/24/22 1503      Physical Exam:  /76 (BP Location: Right arm, Patient Position: Sitting, Cuff Size: Adult)   Pulse 83   Temp 97 8 °F (36 6 °C)   Resp 18   Ht 5' 10" (1 778 m)   Wt 87 5 kg (193 lb)   SpO2 94%   BMI 27 69 kg/m²     Physical Exam  Constitutional:       Appearance: He is well-developed  HENT:      Head: Normocephalic and atraumatic     Eyes:      General: No scleral icterus  Right eye: No discharge  Left eye: No discharge  Conjunctiva/sclera: Conjunctivae normal       Pupils: Pupils are equal, round, and reactive to light  Neck:      Thyroid: No thyromegaly  Trachea: No tracheal deviation  Cardiovascular:      Rate and Rhythm: Normal rate and regular rhythm  Heart sounds: Murmur (Systolic) heard  No friction rub  Pulmonary:      Effort: Pulmonary effort is normal  No respiratory distress  Breath sounds: Normal breath sounds  No wheezing or rales  Chest:      Chest wall: No tenderness  Abdominal:      General: There is no distension  Palpations: Abdomen is soft  There is no hepatomegaly or splenomegaly  Tenderness: There is no abdominal tenderness  There is no guarding or rebound  Musculoskeletal:         General: No tenderness or deformity  Normal range of motion  Cervical back: Normal range of motion and neck supple  Right lower leg: Edema ( trace) present  Left lower leg: Edema ( trace) present  Lymphadenopathy:      Cervical: No cervical adenopathy  Skin:     General: Skin is warm and dry  Coloration: Skin is not pale  Findings: No erythema or rash  Neurological:      Mental Status: He is alert and oriented to person, place, and time  Cranial Nerves: No cranial nerve deficit  Coordination: Coordination normal       Deep Tendon Reflexes: Reflexes are normal and symmetric  Psychiatric:         Behavior: Behavior normal          Thought Content:  Thought content normal          Judgment: Judgment normal            Labs:  Lab Results   Component Value Date    WBC 14 23 (H) 05/23/2022    HGB 10 8 (L) 05/23/2022    HCT 32 6 (L) 05/23/2022    MCV 97 05/23/2022     05/23/2022     Lab Results   Component Value Date     (L) 04/10/2017    K 4 6 05/23/2022     05/23/2022    CO2 25 05/23/2022    BUN 39 (H) 05/23/2022    CREATININE 3 27 (H) 05/23/2022 GLUCOSE 142 (H) 11/01/2018    GLUF 128 (H) 05/23/2022    CALCIUM 9 4 05/23/2022    CORRECTEDCA 10 2 (H) 05/23/2022    AST 18 05/23/2022    ALT 30 05/23/2022    ALKPHOS 124 (H) 05/23/2022    PROT 6 9 04/10/2017    BILITOT 0 7 04/10/2017    EGFR 18 05/23/2022     No results found for: TSH    Patient voiced understanding and agreement in the above discussion  Aware to contact our office with questions/symptoms in the interim

## 2022-05-26 NOTE — TELEPHONE ENCOUNTER
Title: DOSE INCREASE TO LCV/5FU  MG/M2  Date patient scheduled: 6/14/22    Original medication ordered: LCV/5FU    New Medication ordered: SAME MEDS DOSE INCREASE LCV/5FU      Office to route to [facility]    Infusion tech to receive message, confirm scheduled treatment duration matches ordered treatment duration or adjust accordingly, and re-link appointment request orders  Infusion tech to notify pharmacy

## 2022-05-27 NOTE — TELEPHONE ENCOUNTER
Left message with patient to remind him that 5-29-22 is his last dose of Eliquis prior to his surgery on 6-1-22 with Dr Dilan Bradford

## 2022-05-31 ENCOUNTER — ANESTHESIA EVENT (OUTPATIENT)
Dept: PERIOP | Facility: HOSPITAL | Age: 67
End: 2022-05-31
Payer: MEDICARE

## 2022-06-01 ENCOUNTER — ANESTHESIA (OUTPATIENT)
Dept: PERIOP | Facility: HOSPITAL | Age: 67
End: 2022-06-01
Payer: MEDICARE

## 2022-06-01 ENCOUNTER — HOSPITAL ENCOUNTER (OUTPATIENT)
Facility: HOSPITAL | Age: 67
Setting detail: OUTPATIENT SURGERY
Discharge: HOME | End: 2022-06-01
Attending: SURGERY | Admitting: SURGERY
Payer: MEDICARE

## 2022-06-01 VITALS
WEIGHT: 193.34 LBS | OXYGEN SATURATION: 94 % | SYSTOLIC BLOOD PRESSURE: 122 MMHG | DIASTOLIC BLOOD PRESSURE: 59 MMHG | RESPIRATION RATE: 18 BRPM | TEMPERATURE: 97.3 F | BODY MASS INDEX: 27.68 KG/M2 | HEART RATE: 63 BPM | HEIGHT: 70 IN

## 2022-06-01 DIAGNOSIS — N18.4 STAGE 4 CHRONIC KIDNEY DISEASE (HCC): Primary | ICD-10-CM

## 2022-06-01 LAB
GLUCOSE SERPL-MCNC: 114 MG/DL (ref 65–140)
GLUCOSE SERPL-MCNC: 88 MG/DL (ref 65–140)

## 2022-06-01 PROCEDURE — 82948 REAGENT STRIP/BLOOD GLUCOSE: CPT

## 2022-06-01 PROCEDURE — 36821 AV FUSION DIRECT ANY SITE: CPT | Performed by: SURGERY

## 2022-06-01 PROCEDURE — 99024 POSTOP FOLLOW-UP VISIT: CPT | Performed by: SURGERY

## 2022-06-01 RX ORDER — ROPIVACAINE HYDROCHLORIDE 5 MG/ML
INJECTION, SOLUTION EPIDURAL; INFILTRATION; PERINEURAL
Status: COMPLETED | OUTPATIENT
Start: 2022-06-01 | End: 2022-06-01

## 2022-06-01 RX ORDER — CEFAZOLIN SODIUM 1 G/3ML
INJECTION, POWDER, FOR SOLUTION INTRAMUSCULAR; INTRAVENOUS AS NEEDED
Status: DISCONTINUED | OUTPATIENT
Start: 2022-06-01 | End: 2022-06-01

## 2022-06-01 RX ORDER — MIDAZOLAM HYDROCHLORIDE 2 MG/2ML
INJECTION, SOLUTION INTRAMUSCULAR; INTRAVENOUS AS NEEDED
Status: DISCONTINUED | OUTPATIENT
Start: 2022-06-01 | End: 2022-06-01

## 2022-06-01 RX ORDER — FENTANYL CITRATE/PF 50 MCG/ML
25 SYRINGE (ML) INJECTION
Status: DISCONTINUED | OUTPATIENT
Start: 2022-06-01 | End: 2022-06-01 | Stop reason: HOSPADM

## 2022-06-01 RX ORDER — ONDANSETRON 2 MG/ML
4 INJECTION INTRAMUSCULAR; INTRAVENOUS ONCE AS NEEDED
Status: DISCONTINUED | OUTPATIENT
Start: 2022-06-01 | End: 2022-06-01 | Stop reason: HOSPADM

## 2022-06-01 RX ORDER — METOPROLOL TARTRATE 5 MG/5ML
INJECTION INTRAVENOUS AS NEEDED
Status: DISCONTINUED | OUTPATIENT
Start: 2022-06-01 | End: 2022-06-01

## 2022-06-01 RX ORDER — PROPOFOL 10 MG/ML
INJECTION, EMULSION INTRAVENOUS CONTINUOUS PRN
Status: DISCONTINUED | OUTPATIENT
Start: 2022-06-01 | End: 2022-06-01

## 2022-06-01 RX ORDER — SODIUM CHLORIDE 9 MG/ML
25 INJECTION, SOLUTION INTRAVENOUS CONTINUOUS
Status: DISCONTINUED | OUTPATIENT
Start: 2022-06-01 | End: 2022-06-01 | Stop reason: HOSPADM

## 2022-06-01 RX ORDER — HEPARIN SODIUM 1000 [USP'U]/ML
INJECTION, SOLUTION INTRAVENOUS; SUBCUTANEOUS AS NEEDED
Status: DISCONTINUED | OUTPATIENT
Start: 2022-06-01 | End: 2022-06-01

## 2022-06-01 RX ORDER — FENTANYL CITRATE 50 UG/ML
INJECTION, SOLUTION INTRAMUSCULAR; INTRAVENOUS AS NEEDED
Status: DISCONTINUED | OUTPATIENT
Start: 2022-06-01 | End: 2022-06-01

## 2022-06-01 RX ORDER — OXYCODONE HYDROCHLORIDE AND ACETAMINOPHEN 5; 325 MG/1; MG/1
1 TABLET ORAL EVERY 6 HOURS PRN
Qty: 5 TABLET | Refills: 0 | Status: SHIPPED | OUTPATIENT
Start: 2022-06-01 | End: 2022-06-06

## 2022-06-01 RX ADMIN — MIDAZOLAM 1 MG: 1 INJECTION INTRAMUSCULAR; INTRAVENOUS at 08:08

## 2022-06-01 RX ADMIN — MIDAZOLAM 1 MG: 1 INJECTION INTRAMUSCULAR; INTRAVENOUS at 08:04

## 2022-06-01 RX ADMIN — HEPARIN SODIUM 3000 UNITS: 1000 INJECTION INTRAVENOUS; SUBCUTANEOUS at 09:01

## 2022-06-01 RX ADMIN — PROPOFOL 40 MCG/KG/MIN: 10 INJECTION, EMULSION INTRAVENOUS at 08:22

## 2022-06-01 RX ADMIN — CEFAZOLIN SODIUM 2000 MG: 1 INJECTION, POWDER, FOR SOLUTION INTRAMUSCULAR; INTRAVENOUS at 08:25

## 2022-06-01 RX ADMIN — METOROPROLOL TARTRATE 2.5 MG: 5 INJECTION, SOLUTION INTRAVENOUS at 08:29

## 2022-06-01 RX ADMIN — SODIUM CHLORIDE 25 ML/HR: 0.9 INJECTION, SOLUTION INTRAVENOUS at 06:52

## 2022-06-01 RX ADMIN — PHENYLEPHRINE HYDROCHLORIDE 30 MCG/MIN: 10 INJECTION INTRAVENOUS at 08:51

## 2022-06-01 RX ADMIN — FENTANYL CITRATE 50 MCG: 50 INJECTION INTRAMUSCULAR; INTRAVENOUS at 08:04

## 2022-06-01 RX ADMIN — FENTANYL CITRATE 50 MCG: 50 INJECTION INTRAMUSCULAR; INTRAVENOUS at 08:08

## 2022-06-01 RX ADMIN — ROPIVACAINE HYDROCHLORIDE 30 ML: 5 INJECTION, SOLUTION EPIDURAL; INFILTRATION; PERINEURAL at 08:18

## 2022-06-01 NOTE — OP NOTE
OPERATIVE REPORT  PATIENT NAME: Huber Patel    :  1955  MRN: [id number]  Pt Location: [place] [address]    SURGERY DATE: 2022    Surgeon(s) and Role:     * Xuan Turk MD - Primary    Preop Diagnosis:  Stage 3b chronic kidney disease ([facility] [address] ) [N18 32]    Post-Op Diagnosis Codes:     * Stage 3b chronic kidney disease ([facility] [address] ) [N18 32]    Procedure(s) (LRB):  CREATION FISTULA ARTERIOVENOUS (AV) RADIOCEPHALIC (Left)    Specimen(s):  * No specimens in log *    Estimated Blood Loss:   Minimal    Drains:  * No LDAs found *    Anesthesia Type:   Regional with Sedation    Operative Indications:  Stage 3b chronic kidney disease ([facility] [address] ) [N18 32]    Mr Dakota Hicks is a 63yo male with ESRD on HD via permacath  He presents for a LUE AVF creation  All risks and benefits of the procedure were discussed with the patient and informed consent was obtained  Operative Findings:    Palpable radial pulse and thrill over AVF at completion    Complications:   None    Procedure and Technique:  After informed consent was obtained, the patient was brought to the operating room and placed in the supine position  A regional block was performed by anesthesia and conscious sedation was administered  He was given IV antibiotics  Duplex ultrasound was used to examine the radial artery and cephalic vein and these were found to be of adequate size and quality  He was prepped and draped in the usual sterile fashion, exposing the left arm circumferentially  A timeout was performed  A longitudinal incision was made at the wrist   Cautery was used to dissect through the soft tissue  The cephalic vein was identified and freed proximally and distally  The radial artery was then identified and dissected free  Vessel loops were placed proximally and distally  3000 units of IV heparin were given  A bulldog clamp was placed on the cephalic vein centrally and it was ligated and transected peripherally    It was flushed and dilated with heparinized saline  The vessel loops were pulled taught on the brachial artery and an 11-blade was used to make a longitudinal incision  This was lengthened with Vernon scissors  The vein was cut to length and beveled  The anastomosis was created using 7-0 prolene suture, tied at the center, and run circumferentially  Prior to completion, the vein and artery were bled and flushed  The vessel loops were then released, allowing flow through the fistula first and then distally down the arm  An excellent thrill could be felt in the fistula and a radial pulse was palpated  There was a notable branch identified on ultrasound along the cephalic vein at the mid forearm  The thrill was noted to diminish substantially at the branch point  The decision was made to ligate the branch  A longitudinal incision was made over the branch and the subcutaneous tissues were dissected with bovie electrocautery  The branch was identified, isolated and ligated with a 2-0 silk with improvement in thrill distal to the branch  The wound was checked for hemostasis and copiously irrigated with antibiotic saline solution  Both incisions were then closed with 3-0 monocryl running suture for the subcutaneous tissue and 4-0 monocryl running suture in the subcuticular layer  The incision was dressed with exofin glue  The patient was allowed to awaken  He was transferred to the PACU for postoperative care  I was present for the entire procedure and A qualified resident physician was not available    Patient Disposition:  PACU  and hemodynamically stable      SIGNATURE: Connie Monroy MD  DATE: June 1, 2022  TIME: 9:54 AM    Vascular Quality Initiative - Hemodialysis Access Placement    Pre-admission Information   Functional status: Fully active; able to carry on all predisease activities without restriction  ESRD: ESRD: Hemodialysis dependent    Current Access Type : Tunneled Catheter    Historical Information      Previous Access: none    Access Type/Location: none        Procedure Information      Status: Outpatient     Side:left      Anesthesia: Regional      Access Type: Access Type: Surgical AVF Inflow Artery is: Radial, Wrist Intraoperative Artery taget diameter is: 3mm  Outflow Vein is: Cephalic Forearm  Intraoperative Vein target diameter is: 4mm  Anastomosis configuration is: End to Side    Cocomitant Procedure performed-: None    Completion Fistulogram: no     Preop ARTERIAL evaluation and/or treatment: duplex    Preop VENOUS evaluation and/or treatment: ultrasound mapping    *Obtain Target Diameters from study          Post op Information     Discharge Status: Home    Post op Complications: None

## 2022-06-01 NOTE — H&P
H&P Exam - Vascular Surgery   Concepcion Jason 74 y o  male MRN: [id number]  Unit/Bed#: OR POOL Encounter: [id number]    Assessment/Plan     Assessment:  ESRD on HD now via permacath x 3 weeks  He is right handed  Has never had other dialysis access  Denies upper extremity swelling or paresthesias  Negative Lars's test left hand  Palpable radial/ulnar pulses bilaterally      Vein mapping reviewed - adequate caliber distal radial artery 2 9mm left, adequate cephalic vein throughout the length of the left arm      -Discussed options for dialysis access, including arteriovenous fistula vs  Graft creation  Typically recommend proceeding in the nondominant arm first for dialysis access creation  His vein mapping suggests he would be a candidate for a left radiocephalic AVF creation    -Discussed all risks and benefits of the procedure including bleeding, infection, injury to surrounding structures, wound healing issues, steal syndrome, arm swelling, need for revision/further intervention to assist with maturity, fistula thrombosis  He is at increased risk for poor healing, wound breakdown, infection due to simultaneous chemotherapy  He is understanding of these risks and agreeable to proceed  Informed consent was obtained      Plan:  Plan for left upper extremity AVF vs  AVG creation  Regional block with conscious sedation  Informed consent was obtained in the office    History of Present Illness     HPI:  Concepcion Jason is a 74 y o  male former smoker with HTN, HLD, diastolic CHF, COPD, NICOLASA, gout, left renal artery stenosis, SSS s/p pacemaker, Afib on eliquis, mild aortic stenosis, moderate mitral valve stenosis, sigmoid cancer s/p resection on palliative chemotherapy, ESRD on HD via permacath who presents for dialysis access creation  He is currently undergoing chemotherapy for colon cancer with evidence of carcinomatosis on his recent PET scan and follows with oncology  He has been on HD for 3 weeks via permacath   He has no complaints today  Review of Systems   Constitutional: Negative  HENT: Negative  Eyes: Negative  Respiratory: Negative  Cardiovascular: Negative  Gastrointestinal: Negative  Endocrine: Negative  Genitourinary: Negative  Musculoskeletal: Negative  Skin: Negative  Allergic/Immunologic: Negative  Neurological: Negative  Hematological: Negative  Psychiatric/Behavioral: Negative          Historical Information   Past Medical History:   Diagnosis Date    Anemia     iron def    Arthritis     OA    Bruise of both arms     forearms and both hands    Bruises easily     [facility] ) 09/01/2021    colon    [facility] failure) [facility]     Chronic kidney disease     CPAP (continuous positive airway pressure) dependence     Diabetes mellitus ([place] [address] )     Diabetic foot ulcer ([place] [address] )     Eczema     Erectile dysfunction     Gout     Heart murmur     History of permanent cardiac pacemaker placement 11/2018    History of transfusion     Hyperlipidemia     Hypertension     Hypoglycemia 03/08/2019    Murmur     Nephropathy     Osteoarthritis     Pacemaker 11/06/2018    PAD [facility]peripheral artery disease) ([facility])     Seasonal allergies     Sleep apnea     Toe infection     bilat great toes    Vertigo     Walks frequently     Wears dentures     upper    Wears glasses      Past Surgical History:   Procedure Laterality Date    CARDIAC PACEMAKER PLACEMENT      CARPAL TUNNEL RELEASE Left     CARPAL TUNNEL RELEASE Right     CARPAL TUNNEL RELEASE      COLONOSCOPY  08/2020    COLONOSCOPY      FL GUIDED CENTRAL VENOUS ACCESS DEVICE INSERTION  01/11/2022    FOOT AMPUTATION Left 02/10/2020    Procedure: PARTIAL 1ST RAY AMPUTATION;  Surgeon: Mukesh Javed DPM;  [facility];  Service: Podiatry    INCISION AND DRAINAGE OF WOUND Left 01/12/2020    Procedure: INCISION AND DRAINAGE (I&D) EXTREMITY AND REMOVAL OF SESMOID BONE;  Surgeon: Vasquez Simon DPM;  [facility];  Service: Podiatry    IR OTHER  2022    IR PICC REPOSITION  2020    IR TUNNELED DIALYSIS CATHETER PLACEMENT  2022    KNEE ARTHROSCOPY Left     KNEE ARTHROSCOPY Right     KNEE SURGERY      MI AMPUTATION TOE,I-P JT Bilateral 2017    Procedure: PARTIAL AMPUTATION RIGHT AND LEFT HALLUX ;  Surgeon: Dany Ibarra DPM;  [facility];  Service: Podiatry    MI AMPUTATION TOE,MT-P JT Left 2017    Procedure: 2ND TOE AMPUTATION;  Surgeon: Dany Ibarra DPM;  [facility];  Service: Podiatry    MI INSJ TUNNELED CTR VAD W/SUBQ PORT AGE 5 YR/> N/A 2022    Procedure: INSERTION VENOUS PORT ( PORT-A-CATH) IR;  Surgeon: Latasha Todd DO;  [facility];  Service: Interventional Radiology    RESECTION LOW ANTERIOR LAPAROSCOPIC N/A 10/21/2021    Procedure: RESECTION LOW ANTERIOR LAPAROSCOPIC;  Surgeon: Alayna Rose MD;  [facility];  Service: Colorectal    SHOULDER ARTHROSCOPY Left     with screws,RTC    SHOULDER SURGERY Left     rotator cuff    TOE AMPUTATION Left 2020    Procedure: Anirudh Arenas;  Surgeon: Esperanza Desir DPM;  [facility];  Service: Podiatry    UPPER GASTROINTESTINAL ENDOSCOPY  2020    Intestinal metaplasia prepyloric    VASECTOMY       Social History   Social History     Substance and Sexual Activity   Alcohol Use Never    Alcohol/week: 0 0 standard drinks     Social History     Substance and Sexual Activity   Drug Use No     Social History     Tobacco Use   Smoking Status Former Smoker    Packs/day: 0 50    Years: 20 00    Pack years: 10 00    Types: Cigarettes    Start date: 1    Quit date:     Years since quittin 4   Smokeless Tobacco Never Used   Tobacco Comment    quit 10 years ago     E-Cigarette/Vaping    E-Cigarette Use Never User      E-Cigarette/Vaping Substances    Nicotine No     THC No     CBD No     Flavoring No     Other No     Unknown No      Family History: non-contributory    Meds/Allergies   all current active meds have been reviewed  Allergies   Allergen Reactions    Invokana [Canagliflozin] Other (See Comments)     Caused circulation problems    Lamisil [Terbinafine] Blisters     Wife states " His skin peeled from head to toe"    Other Swelling     Pomegranate - facial swelling, no swelling of tongue, esophagus  Adhesive tape   Latex Rash       Objective   Vitals: Blood pressure 151/67, pulse 61, temperature 98 2 °F (36 8 °C), resp  rate 16, height 5' 10" (1 778 m), weight 87 7 kg (193 lb 5 5 oz), SpO2 98 %  ,Body mass index is 27 74 kg/m²  No intake or output data in the 24 hours ending 06/01/22 0753  Invasive Devices  Report    Central Venous Catheter Line  Duration           Port A Cath Right Subclavian -- days          Peripheral Intravenous Line  Duration           Peripheral IV 06/01/22 Distal;Dorsal (posterior); Right Forearm <1 day          Hemodialysis Catheter  Duration           HD Permanent Double Catheter 35 days                Physical Exam  Constitutional:       Appearance: Normal appearance  HENT:      Head: Normocephalic and atraumatic  Cardiovascular:      Rate and Rhythm: Normal rate  Pulses:           Radial pulses are 2+ on the right side and 2+ on the left side  Pulmonary:      Effort: Pulmonary effort is normal    Musculoskeletal:         General: Normal range of motion  Cervical back: Normal range of motion and neck supple  Skin:     General: Skin is warm and dry  Capillary Refill: Capillary refill takes less than 2 seconds  Neurological:      General: No focal deficit present  Mental Status: He is alert and oriented to person, place, and time  Psychiatric:         Mood and Affect: Mood normal          Behavior: Behavior normal          Thought Content: Thought content normal          Judgment: Judgment normal          Lab Results: I have personally reviewed pertinent reports  Imaging:  I have personally reviewed pertinent reports  EKG, Pathology, and Other Studies: I have personally reviewed pertinent reports  Code Status: Prior  Advance Directive and Living Will: Yes    Power of :    POLST:      Counseling / Coordination of Care  Counseling/Coordination of Care: Total floor / unit time spent today 25 minutes  Greater than 50% of total time was spent with the patient and / or family counseling and / or coordination of care   A description of the counseling / coordination of care: avf creation

## 2022-06-01 NOTE — PROGRESS NOTES
Spoke to [employer] rep regarding patient's pacemaker and if needs to be interrogated  Magnet was placed intraop and removed at end of case  Pt back in his regularly paced rhythm  [employer] rep reported that if magnet was placed and taken off then no need for interrogation  Also pacemaker gets interrogated remotely every night per rep      Karol Rangel MD  6/1/2022  11:35 AM

## 2022-06-01 NOTE — ANESTHESIA PROCEDURE NOTES
Peripheral Block    Patient location during procedure: OR  Start time: 6/1/2022 8:17 AM  Reason for block: at surgeon's request and post-op pain management  Staffing  Performed: Anesthesiologist   Anesthesiologist: Nikunj Jones MD  Preanesthetic Checklist  Completed: patient identified, IV checked, site marked, risks and benefits discussed, surgical consent, monitors and equipment checked, pre-op evaluation and timeout performed  Peripheral Block  Patient position: supine  Prep: ChloraPrep  Patient monitoring: heart rate, cardiac monitor, continuous pulse ox and frequent blood pressure checks  Block type: supraclavicular  Laterality: left  Injection technique: single-shot  Procedures: ultrasound guided, Ultrasound guidance required for the procedure to increase accuracy and safety of medication placement and decrease risk of complications   and nerve stimulator  Ultrasound permanent image savedropivacaine (NAROPIN) 0 5 % - Perineural   30 mL - 6/1/2022 8:18:00 AM  Needle  Needle type: Stimuplex   Needle gauge: 22 G  Needle length: 5 cm  Needle localization: anatomical landmarks, nerve stimulator and ultrasound guidance  Assessment  Injection assessment: incremental injection, local visualized surrounding nerve on ultrasound, negative aspiration for heme and no paresthesia on injection  Heart rate change: no  Slow fractionated injection: yes  Post-procedure:  site cleaned  patient tolerated the procedure well with no immediate complications

## 2022-06-01 NOTE — ANESTHESIA POSTPROCEDURE EVALUATION
Post-Op Assessment Note    CV Status:  Stable    Pain management: adequate     Mental Status:  Alert and awake   Hydration Status:  Euvolemic   PONV Controlled:  Controlled   Airway Patency:  Patent      Post Op Vitals Reviewed: Yes      Staff: Anesthesiologist         No complications documented      BP      Temp     Pulse     Resp      SpO2      /59   Pulse 63   Temp (!) 97 3 °F (36 3 °C) (Temporal)   Resp 18   Ht 5' 10" (1 778 m)   Wt 87 7 kg (193 lb 5 5 oz)   SpO2 94%   BMI 27 74 kg/m²

## 2022-06-01 NOTE — DISCHARGE INSTRUCTIONS
DISCHARGE INSTRUCTIONS  DIALYSIS FISTULA SURGERY    ACTIVITY:  Limit use of the operated arm to what is necessary for the first day after surgery  On the second day after surgery, you may start to increase use of your arm as tolerated  Avoid heavy lifting (no more than 15 lbs) for the first one week  You should start to exercise your hand on the side of the fistula by squeezing a stress ball or a rolled-up sock  This increases blood flow in your fistula and arm so your fistula will function better  Feel for a thrill every day  The thrill is the vibration or pulse you feel over the fistula that means the blood is flowing through it  If you cannot feel a thrill, call our office ([phone])  DIET:   Resume your normal diet  Good nutrition is important for healing of your incision  DRESSING:   You may have surgical glue at your surgical site  There are stitches present under the skin which will absorb on their own  The glue is used to cover the incision, assist in closure, and prevent contamination  This adhesive will darken and peel away on its own within one to two weeks  Do not pick at it  If you have a dressing over your surgical site, remove this on the second day after surgery  INCISION:   If you do not have a dialysis catheter in place, you may shower and get your incision wet  Wash incision daily with soap and water, but do not rub or scrub the incision; rinse thoroughly and pat dry  You may have stitches or staples to close your incision and it is okay for these to get wet  Do not bathe in a tub or swim for the first 4 week following surgery or if you have any open wounds  It is normal to have mild swelling or discoloration around the incision  If increasing redness or pain develops, call our office immediately  Numbness in the region of the incision may occur following the surgery  This normally improves over six to twelve months    If you have numbness or pain in your hand, please call our office immediately  DO NOT put any powders, creams, ointments, or lotions on your incision  ARM SWELLING:    Most patients have some noticeable arm swelling after surgery  This usually disappears within a few weeks  If swelling is present, elevate the arm whenever possible  RESTRICTIONS:   Do NOT have blood draws, IV's, or blood pressures performed on the operated arm  FISTULA USE:    Your fistula will not be used until it has fully matured - approximately 6 to 12 weeks  If you are using a catheter for dialysis, this will not be removed until after your fistula has matured and is being used for dialysis without any issues  FOLLOW UP STUDIES:  A Doppler ultrasound will be performed about 5-6 weeks after surgery  Your surgeon will arrange this at your first postoperative visit  FOLLOW UP APPOINTMENTS:  Making and keeping follow up appointments and ultrasound tests are important to your recovery  If you have difficulty making it to or keeping your follow up appointments, call the office  If you have increased pain, fever >101 5, increased drainage, redness or a bad smell at your surgery site, new coldness/numbness of your arm or leg, please call us immediately and GO directly to the ER  PLEASE CALL THE OFFICE IF YOU HAVE ANY QUESTIONS  [phone]  [phone] [address] [facility] , [address], [facility], [address]  600 [facility] 20, [address], [place], 210 [facility]  [address], [facility], [address] [place] [address]  611 [facility], One [facility], [address] [place] [address], [address]  [address] Re[address], [address]st Floor, [address]  [address], [address] [facility], [address] [place] [id number] [facility]  1307 [facility], [address], [facility], [address]  [address] [facility], 532 [facility], One [facility], [address]  201 [facility] [address] [facility], [address]  [address] [place] [address] [address]

## 2022-06-02 ENCOUNTER — TELEPHONE (OUTPATIENT)
Dept: VASCULAR SURGERY | Facility: CLINIC | Age: 67
End: 2022-06-02

## 2022-06-02 NOTE — TELEPHONE ENCOUNTER
Wife called to confirm instructions re: bathing, she wanted to be sure it is ok to cleanse incision but she should not get marie cath wet, advised this is correct  Per d/c instructions  "If you have a dressing over your surgical site, remove  this on the second day after surgery  INCISION: If you do not have a dialysis catheter in place, you may shower and get your incision wet  Wash incision daily  with soap and water, but do not rub or scrub the incision; rinse thoroughly and pat dry  You may have stitches or staples  to close your incision and it is okay for these to get wet  Do not bathe in a tub or swim for the first 4 week following  surgery or if you have any open wounds "  Wife verbalized understanding of same  She also ? How the fistula will be accessed for HD, advised once it is mature enough the provider will give ok to use, it is then cannulated w/ needles at dialysis  Request she call w/ any additional questions or concerns

## 2022-06-03 ENCOUNTER — TELEPHONE (OUTPATIENT)
Dept: VASCULAR SURGERY | Facility: CLINIC | Age: 67
End: 2022-06-03

## 2022-06-03 NOTE — TELEPHONE ENCOUNTER
Vascular Nurse Navigator Post Op Call    Procedure: CREATION FISTULA ARTERIOVENOUS (AV) RADIOCEPHALIC (Left)    Date of Procedure:  6/1/22    Surgeon:   Javier Bai MD - Primary      Painful tingling or numbness in your fingers?: No    Paleness/Coolness in hands/fingers?: No    Redness, swelling or pus from your wound?: No    Bleeding?: No    Thrill present?: patient's wife will check when he comes home from dialysis and check to see if dialysis staff assessed fistula    Anticoagulation pt was discharged on post op?: Apixaban (Eliquis)    Statin pt was discharged on post op?:  Zocor (simvastatin)    Fever/chills?: No    Uncontrolled Pain?: No      Reviewed discharge instructions and incision care with patient  Dialysis Days and Location:  MWF at [facility]:  6/17/22 at 3:15 pm with Radha Warren PA-C at [facility] [address] Confirmed?: Yes      Any Questions or Concerns? Spoke with patient's wife in regards to above as patient was at dialysis  She stated that patient is doing good since procedure  Reviewed incision care with her  All questions answered including when fistula can be used and process to check to see when fistula has matured to be used  No concerns expressed at this time

## 2022-06-07 ENCOUNTER — REMOTE DEVICE CLINIC VISIT (OUTPATIENT)
Dept: CARDIOLOGY CLINIC | Facility: CLINIC | Age: 67
End: 2022-06-07
Payer: MEDICARE

## 2022-06-07 ENCOUNTER — TELEPHONE (OUTPATIENT)
Dept: ENDOCRINOLOGY | Facility: CLINIC | Age: 67
End: 2022-06-07

## 2022-06-07 DIAGNOSIS — Z95.0 CARDIAC PACEMAKER IN SITU: Primary | ICD-10-CM

## 2022-06-07 DIAGNOSIS — R33.9 URINARY RETENTION: ICD-10-CM

## 2022-06-07 PROCEDURE — 93294 REM INTERROG EVL PM/LDLS PM: CPT | Performed by: INTERNAL MEDICINE

## 2022-06-07 PROCEDURE — 93296 REM INTERROG EVL PM/IDS: CPT | Performed by: INTERNAL MEDICINE

## 2022-06-07 NOTE — PROGRESS NOTES
Results for orders placed or performed in visit on 06/07/22   Cardiac EP device report    Narrative    MDT-DUAL CHAMBER PPM (AAIR-DDDR MODE)/ACTIVE SYSTEM IS MRI CONDITIONAL  CARELINK TRANSMISSION: BATTERY VOLTAGE ADEQUATE (8 5 YRS)  AP<0 1%, -100% (>40% [place])  ALL AVAILABLE LEAD PARAMETERS WITHIN NORMAL LIMITS  20 AF EPISODES & CURRENTLY IN AFLUTTER  AF BURDEN-100%  HX: PERSISTENT AF & ON ELIQUIS & METOPROLOL  NORMAL DEVICE FUNCTION   GV

## 2022-06-08 ENCOUNTER — TELEPHONE (OUTPATIENT)
Dept: ENDOCRINOLOGY | Facility: CLINIC | Age: 67
End: 2022-06-08

## 2022-06-08 RX ORDER — TAMSULOSIN HYDROCHLORIDE 0.4 MG/1
CAPSULE ORAL
Qty: 90 CAPSULE | Refills: 3 | Status: SHIPPED | OUTPATIENT
Start: 2022-06-08

## 2022-06-08 NOTE — TELEPHONE ENCOUNTER
----- Message from Mariposa Guillen sent at 6/6/2022  3:51 PM EDT -----  Regarding: Medication change request for Darshan Counter  [facility] where Lucia Beverly goes to get his dialysis, is requesting that Lucia Beverly not take his Amlodipine before his treatments which are from 11:30-3PM on M, W & F  What time of the day would you like him to take this medication? He can take it anytime between 3PM and bedtime (which keeps getting earlier, with everything that he has going on) on dialysis days and at any other time on the other days of the week   I fill his pill boxes so I can put it for any time that you want it to be taken     Hope you are doing well and thanks for always being there for Lucia Beverly   Jd Bunkers and Darshan Counter

## 2022-06-11 DIAGNOSIS — M10.9 GOUT, UNSPECIFIED CAUSE, UNSPECIFIED CHRONICITY, UNSPECIFIED SITE: ICD-10-CM

## 2022-06-13 ENCOUNTER — APPOINTMENT (OUTPATIENT)
Dept: LAB | Facility: CLINIC | Age: 67
End: 2022-06-13
Payer: MEDICARE

## 2022-06-13 DIAGNOSIS — C18.7 MALIGNANT NEOPLASM OF SIGMOID COLON (HCC): ICD-10-CM

## 2022-06-13 LAB
CEA SERPL-MCNC: 1 NG/ML (ref 0–3)
CRP SERPL QL: 10.1 MG/L
ERYTHROCYTE [SEDIMENTATION RATE] IN BLOOD: 47 MM/HOUR (ref 0–19)
LDH SERPL-CCNC: 259 U/L (ref 81–234)

## 2022-06-13 PROCEDURE — 85652 RBC SED RATE AUTOMATED: CPT

## 2022-06-13 PROCEDURE — 86140 C-REACTIVE PROTEIN: CPT

## 2022-06-13 PROCEDURE — 82378 CARCINOEMBRYONIC ANTIGEN: CPT

## 2022-06-13 PROCEDURE — 83615 LACTATE (LD) (LDH) ENZYME: CPT

## 2022-06-13 RX ORDER — ALLOPURINOL 300 MG/1
300 TABLET ORAL EVERY MORNING
Qty: 90 TABLET | Refills: 0 | Status: SHIPPED | OUTPATIENT
Start: 2022-06-13

## 2022-06-13 NOTE — TELEPHONE ENCOUNTER
Failed protocol    Endocrinology:  Gout Agents - allopurinol Failed 06/11/2022 08:07 AM    Cr in normal range and within 360 days    Uric Acid in normal range and within 180 days    Valid encounter within last 12 months

## 2022-06-14 ENCOUNTER — OFFICE VISIT (OUTPATIENT)
Dept: HEMATOLOGY ONCOLOGY | Facility: CLINIC | Age: 67
End: 2022-06-14
Payer: MEDICARE

## 2022-06-14 ENCOUNTER — HOSPITAL ENCOUNTER (OUTPATIENT)
Dept: INFUSION CENTER | Facility: HOSPITAL | Age: 67
Discharge: HOME | End: 2022-06-14
Attending: INTERNAL MEDICINE
Payer: MEDICARE

## 2022-06-14 VITALS
TEMPERATURE: 97.7 F | RESPIRATION RATE: 18 BRPM | WEIGHT: 193 LBS | OXYGEN SATURATION: 98 % | DIASTOLIC BLOOD PRESSURE: 70 MMHG | HEIGHT: 70 IN | HEART RATE: 82 BPM | SYSTOLIC BLOOD PRESSURE: 130 MMHG | BODY MASS INDEX: 27.63 KG/M2

## 2022-06-14 VITALS
HEART RATE: 89 BPM | RESPIRATION RATE: 18 BRPM | OXYGEN SATURATION: 94 % | WEIGHT: 193.56 LBS | DIASTOLIC BLOOD PRESSURE: 66 MMHG | SYSTOLIC BLOOD PRESSURE: 126 MMHG | HEIGHT: 70 IN | BODY MASS INDEX: 27.71 KG/M2

## 2022-06-14 DIAGNOSIS — D70.1 CHEMOTHERAPY-INDUCED NEUTROPENIA (HCC): ICD-10-CM

## 2022-06-14 DIAGNOSIS — C18.7 MALIGNANT NEOPLASM OF SIGMOID COLON (HCC): Primary | ICD-10-CM

## 2022-06-14 DIAGNOSIS — T45.1X5A CHEMOTHERAPY-INDUCED NEUTROPENIA (HCC): ICD-10-CM

## 2022-06-14 DIAGNOSIS — D64.9 NORMOCYTIC ANEMIA: ICD-10-CM

## 2022-06-14 PROCEDURE — 99214 OFFICE O/P EST MOD 30 MIN: CPT | Performed by: NURSE PRACTITIONER

## 2022-06-14 PROCEDURE — 96367 TX/PROPH/DG ADDL SEQ IV INF: CPT

## 2022-06-14 PROCEDURE — 96366 THER/PROPH/DIAG IV INF ADDON: CPT

## 2022-06-14 PROCEDURE — 96409 CHEMO IV PUSH SNGL DRUG: CPT

## 2022-06-14 RX ORDER — FLUOROURACIL 50 MG/ML
300 INJECTION, SOLUTION INTRAVENOUS ONCE
Status: COMPLETED | OUTPATIENT
Start: 2022-06-14 | End: 2022-06-14

## 2022-06-14 RX ORDER — SODIUM CHLORIDE 9 MG/ML
20 INJECTION, SOLUTION INTRAVENOUS ONCE AS NEEDED
Status: DISCONTINUED | OUTPATIENT
Start: 2022-06-14 | End: 2022-06-18 | Stop reason: HOSPADM

## 2022-06-14 RX ORDER — DEXTROSE MONOHYDRATE 50 MG/ML
20 INJECTION, SOLUTION INTRAVENOUS ONCE
Status: DISCONTINUED | OUTPATIENT
Start: 2022-06-14 | End: 2022-06-18 | Stop reason: HOSPADM

## 2022-06-14 RX ADMIN — SODIUM CHLORIDE 20 ML/HR: 0.9 INJECTION, SOLUTION INTRAVENOUS at 09:01

## 2022-06-14 RX ADMIN — FLUOROURACIL 605 MG: 50 INJECTION, SOLUTION INTRAVENOUS at 11:59

## 2022-06-14 RX ADMIN — DEXAMETHASONE SODIUM PHOSPHATE: 10 INJECTION, SOLUTION INTRAMUSCULAR; INTRAVENOUS at 09:03

## 2022-06-14 RX ADMIN — LEUCOVORIN CALCIUM 600 MG: 100 INJECTION, POWDER, LYOPHILIZED, FOR SUSPENSION INTRAMUSCULAR; INTRAVENOUS at 09:45

## 2022-06-14 NOTE — PROGRESS NOTES
Hematology/Oncology Outpatient Follow-up  Carlitos Titus 74 y o  male 1955 [id number]    Date:  6/14/2022      Assessment and Plan:  1  Malignant neoplasm of sigmoid colon [facility])  Patient will be continued on his current treatment which he is tolerating very well  He is scheduled to start cycle 3 day 1 of his 5 FU/Leucovorin IV push 5 weeks on with 1 week of a break  His dose was already increased by an additional 10% (300 mg/m2) for start of cycle 3  We will continue to monitor him closely and titrate his dose according to tolerance  Will most likely aim to do repeat imaging after completion of cycle 3  Will continue to monitor his laboratory studies weekly while he is on treatment  Request patient follow-up again in about 2 or 3 weeks with additional studies prior     - CBC and differential; Future  - Comprehensive metabolic panel; Future  - Magnesium; Future  - LD,Blood; Future  - C-reactive protein; Future  - Sedimentation rate, automated; Future  - CBC and differential; Standing  - Comprehensive metabolic panel; Standing  - CBC and differential  - Comprehensive metabolic panel    2  Normocytic anemia  Patient continues to have stable normocytic anemia hemoglobin 10  2- which is multifactorial including anemia of chronic renal dysfunction, anemia of chronic disease and treatment related  He continues to get hemodialysis 3 times a week  - CBC and differential; Future  - Magnesium; Future  - LD,Blood; Future  - C-reactive protein; Future  - Sedimentation rate, automated; Future    HPI:  Patient presents today for a follow-up visit  He had his dialysis fistula placed to his left forearm 06/01/2022  Continues to get hemodialysis 3 times a week  He states that overall he is feeling well today  Is tolerating his treatment well  Has no new complaints      His most recent laboratory studies from yesterday 06/13/2022 showed high normal WBC 10 13, he continues to have stable normocytic anemia H&H 10 2/31, MCV 97 and normal platelet count 134  Creatinine 3 98, GFR 14, albumin 3 0, alk-phos 142 his corrected calcium is mildly elevated 10 3 remaining metabolic panel is appropriate  Inflammatory markers continue to be elevated C-reactive protein 10 1 and sed rate 47  LDH mildly elevated 259  Ca is normal 1 0  Oncology History Overview Note   Patient with multiple comorbid conditions including CHF, advanced chronic kidney disease, diabetes mellitus, arthritis, sleep apnea, etc   He apparently had multiple colonoscopies 2021  He was found to have adenocarcinoma in the sigmoid region rising from tubular adenoma on 01/08/2021  He had a PET-CT scan on 02/19/2021 which showed uptake in the sigmoid colon otherwise no finding for hypermetabolic metastasis was found  The patient had 2 other colonoscopies and finally had his laparoscopic surgery on 10/21/2021 which showed showed residual adenocarcinoma moderately differentiated arising in the tubular adenoma with high-grade dysplasia, pT3 with 3/16 lymph node involvement  All margins were negative without obvious lymphovascular or perineural invasion  The patient subsequently on 11/24/2021 had a CT scan of the chest abdomen pelvis and IV contrast was given by mistake which show resulted in significant worsening of his kidney function with creatinine around 7  The he required hospitalization  His baseline creatinine level is around 3  The CT scan on 11/24/2021 showed 1 new cm nodule ground-glass density in the superior segment of the right lower lobe which was thought to be inflammatory versus neoplastic  There are 2 areas of heterogeneous attenuation also in the abdominal area of unknown significance  Follow-up in 3 months was suggested  PET/CT 1/4/22:   IMPRESSION:  1   1 0 cm groundglass right lower lobe lung nodules seen on CT of chest dated 11/24/2021 is not definitively visualized on the current exam and likely has resolved although evaluation of the lung fields is limited secondary to respiratory motion   artifact  There is no focal FDG activity in the chest characteristic of hypermetabolic malignancy  Given limitations, short interval follow-up with CT of chest in 3 months is recommended to document resolution  2   Multifocal peritoneal hypermetabolic soft tissue foci involving the left mid abdomen and extending along the left paracolic gutter to the central lower abdomen/pelvis, most of which appears to be associated with internal attenuation and is most   suggestive of fat necrosis with underlying peritoneal carcinomatosis not excluded, particularly in the anterior left mid abdomen  As recommended previously, short interval follow-up with imaging in 3 months is recommended to ensure stability and exclude   developing peritoneal carcinomatosis  3   Moderate bilateral pleural effusions  4   Stable in size minimally FDG avid mediastinal/right hilar/left inguinal lymph nodes of uncertain clinical significance but most suggestive of a benign inflammatory process  Attention to these regions on follow-up scans is recommended  5   Air-fluid levels within the bilateral maxillary sinuses for which correlation is recommended to exclude acute sinusitis  He was started on the FOLFOX regimen without the oxaliplatin 1/2022  He was educated he will be educated extensively about the chemotherapy and the potential side effects including renal failure/hemodialysis risk  Patient did not tolerate the 1st cycle of chemotherapy well at all which resulted in hospitalization lesion and need for dialysis x2 treatments  He sought 2nd opinion in network with 1 of our colleagues Sheila [phone]  Decision was made to trial 5 FU/Leucovorin at 50% of the usual dose (250mg/m2) via IV push weekly 5 weeks on with 1 week of a break which can be adjusted/titrated slowly according to patient's tolerance        Colon cancer ([facility] [address] )   10/23/2021 Initial Diagnosis    Colon cancer ([facility] [address] ) 1/18/2022 - 1/20/2022 Chemotherapy    fluorouracil (ADRUCIL), 400 mg/m2 = 830 mg, Intravenous, Once, 1 of 1 cycle  Administration: 830 mg (1/18/2022)  leucovorin calcium IVPB, 828 mg, Intravenous, Once, 1 of 12 cycles  Administration: 800 mg (1/18/2022)  fluorouracil (ADRUCIL) ambulatory infusion Soln, 1,200 mg/m2/day = 4,970 mg, Intravenous, Over 46 hours, 1 of 12 cycles  Dose modification: 800 mg/m2/day (original dose 1,200 mg/m2/day, Cycle 2, Reason: Other (Must fill in a comment), Comment: Dr young )     3/7/2022 -  Chemotherapy    fluorouracil (ADRUCIL), 515 mg (62 5 % of original dose 400 mg/m2), Intravenous, Once, 2 of 12 cycles  Dose modification: 250 mg/m2 (original dose 400 mg/m2, Cycle 1, Reason: Dose modified as per discussion with consulting physician), 275 mg/m2 (original dose 400 mg/m2, Cycle 2, Reason: Max Dose Reached), 300 mg/m2 (original dose 400 mg/m2, Cycle 3, Reason: Dose modified as per discussion with consulting physician)  Administration: 500 mg (3/7/2022), 500 mg (3/14/2022), 500 mg (3/21/2022), 500 mg (3/28/2022), 500 mg (4/4/2022), 565 mg (4/18/2022), 555 mg (5/3/2022), 555 mg (5/10/2022), 555 mg (5/17/2022), 555 mg (5/24/2022)  leucovorin calcium IVPB, 515 mg (62 5 % of original dose 400 mg/m2), Intravenous, Once, 2 of 12 cycles  Dose modification: 250 mg/m2 (original dose 400 mg/m2, Cycle 1, Reason: Dose modified as per discussion with consulting physician), 275 mg/m2 (original dose 400 mg/m2, Cycle 2, Reason: Max Dose Reached), 300 mg/m2 (original dose 400 mg/m2, Cycle 3, Reason: Dose modified as per discussion with consulting physician)  Administration: 500 mg (3/7/2022), 500 mg (3/14/2022), 500 mg (3/21/2022), 500 mg (3/28/2022), 500 mg (4/4/2022), 550 mg (4/18/2022), 550 mg (5/3/2022), 550 mg (5/10/2022), 550 mg (5/17/2022), 550 mg (5/24/2022)         Interval history:    ROS: Review of Systems   Constitutional: Positive for fatigue   Negative for activity change, appetite change, chills, fever and unexpected weight change  HENT: Negative for congestion, hearing loss, mouth sores, nosebleeds, sore throat and trouble swallowing  Eyes: Negative  Respiratory: Positive for cough and shortness of breath  Negative for chest tightness  Cardiovascular: Negative for chest pain, palpitations and leg swelling  Gastrointestinal: Positive for constipation (mild)  Negative for abdominal distention, abdominal pain, blood in stool, diarrhea, nausea and vomiting  Genitourinary: Negative for difficulty urinating, dysuria, frequency, hematuria and urgency  Musculoskeletal: Positive for myalgias  Negative for arthralgias, back pain, gait problem and joint swelling  Skin: Positive for rash  Negative for color change and pallor  Neurological: Positive for numbness  Negative for dizziness, weakness, light-headedness and headaches  Hematological: Negative for adenopathy  Does not bruise/bleed easily  Psychiatric/Behavioral: Negative for dysphoric mood and sleep disturbance         Past Medical History:   Diagnosis Date    Anemia     iron def    Arthritis     OA    Bruise of both arms     forearms and both hands    Bruises easily     Cancer ([facility] ) 09/01/2021    colon    [facility]est[facility] failure[facility])     Chronic kidney disease     CPAP (continuous positive airway pressure) dependence     Diabetes mellitus ([facility] )     Diabetic foot ulcer ([facility] )     Eczema     Erectile dysfunction     Gout     Heart murmur     History of permanent cardiac pacemaker placement 11/2018    History of transfusion     Hyperlipidemia     Hypertension     Hypoglycemia 03/08/2019    Murmur     Nephropathy     Osteoarthritis     Pacemaker 11/06/2018    PAD (peripheral artery disease) ([facility])     Seasonal allergies     Sleep apnea     Toe infection     bilat great toes    Vertigo     Walks frequently     Wears dentures     upper    Wears glasses        Past Surgical History:   Procedure Laterality Date    CARDIAC PACEMAKER PLACEMENT      CARPAL TUNNEL RELEASE Left     CARPAL TUNNEL RELEASE Right     CARPAL TUNNEL RELEASE      COLONOSCOPY  08/2020    COLONOSCOPY      FL GUIDED CENTRAL VENOUS ACCESS DEVICE INSERTION  01/11/2022    FOOT AMPUTATION Left 02/10/2020    Procedure: PARTIAL 1ST RAY AMPUTATION;  Surgeon: Melba Guerra DPM;  [facility];  Service: Podiatry    INCISION AND DRAINAGE OF WOUND Left 01/12/2020    Procedure: INCISION AND DRAINAGE (I&D) EXTREMITY AND REMOVAL OF SESMOID BONE;  Surgeon: Yenny Casas DPM;  [facility];  Service: Podiatry    IR OTHER  01/21/2022    IR PICC REPOSITION  01/14/2020    IR TUNNELED DIALYSIS CATHETER PLACEMENT  04/26/2022    KNEE ARTHROSCOPY Left     KNEE ARTHROSCOPY Right     KNEE SURGERY      AR AMPUTATION TOE,I-P JT Bilateral 05/02/2017    Procedure: PARTIAL AMPUTATION RIGHT AND LEFT HALLUX ;  Surgeon: Melba Guerra DPM;  [facility];  Service: Podiatry    AR AMPUTATION TOE,MT-P JT Left 07/25/2017    Procedure: 2ND TOE AMPUTATION;  Surgeon: Melba Guerra DPM;  [facility];  Service: Podiatry    AR ANASTOMOSIS,AV,ANY SITE Left 6/1/2022    Procedure: CREATION FISTULA ARTERIOVENOUS (AV) RADIOCEPHALIC;  Surgeon: Get Silva MD;  [facility];  Service: Vascular    AR INSJ TUNNELED CTR VAD W/SUBQ PORT AGE 5 YR/> N/A 01/11/2022    Procedure: INSERTION VENOUS PORT ( PORT-A-CATH) IR;  Surgeon: Alvarez Jackson DO;  [facility];  Service: Interventional Radiology    RESECTION LOW ANTERIOR LAPAROSCOPIC N/A 10/21/2021    Procedure: RESECTION LOW ANTERIOR LAPAROSCOPIC;  Surgeon: Ryan Goel MD;  [facility];  Service: Colorectal    SHOULDER ARTHROSCOPY Left     with screws,RTC    SHOULDER SURGERY Left     rotator cuff    TOE AMPUTATION Left 01/08/2020    Procedure: HALLUX AMPUTATION;  Surgeon: Yenny Casas DPM;  [facility];  Service: Podiatry    UPPER GASTROINTESTINAL ENDOSCOPY  2020    Intestinal metaplasia prepyloric    VASECTOMY         Social History     Socioeconomic History    Marital status: /Civil Union     Spouse name: None    Number of children: None    Years of education: None    Highest education level: None   Occupational History    None   Tobacco Use    Smoking status: Former Smoker     Packs/day: 0 50     Years: 20 00     Pack years: 10 00     Types: Cigarettes     Start date: 1     Quit date:      Years since quittin 4    Smokeless tobacco: Never Used    Tobacco comment: quit 10 years ago   Vaping Use    Vaping Use: Never used   Substance and Sexual Activity    Alcohol use: Never     Alcohol/week: 0 0 standard drinks    Drug use: No    Sexual activity: Not Currently     Partners: Female   Other Topics Concern    None   Social History Narrative    ** Merged History Encounter **         Daily cola consumption (2 cans/day)     Social Determinants of Health     Financial Resource Strain: Not on file   Food Insecurity: No Food Insecurity    Worried About Running Out of Food in the Last Year: Never true    Joel of Food in the Last Year: Never true   Transportation Needs: No Transportation Needs    Lack of Transportation (Medical): No    Lack of Transportation (Non-Medical):  No   Physical Activity: Not on file   Stress: Not on file   Social Connections: Not on file   Intimate Partner Violence: Not on file   Housing Stability: Low Risk     Unable to Pay for Housing in the Last Year: No    Number of Places Lived in the Last Year: 1    Unstable Housing in the Last Year: No       Family History   Problem Relation Age of Onset    Heart disease Father         passed away    Hypertension Father     Pulmonary embolism Father     Hypertension Mother         assed away       Allergies   Allergen Reactions    Invokana [Canagliflozin] Other (See Comments)     Caused circulation problems    Lamisil [Terbinafine] Blisters     Wife states " His skin peeled from head to toe"    Other Swelling     Pomegranate - facial swelling, no swelling of tongue, esophagus  Adhesive tape   Latex Rash         Current Outpatient Medications:     allopurinol (ZYLOPRIM) 300 mg tablet, Take 1 tablet (300 mg total) by mouth every morning, Disp: 90 tablet, Rfl: 0    amLODIPine (NORVASC) 10 mg tablet, Take 1 tablet (10 mg total) by mouth daily, Disp: 90 tablet, Rfl: 3    apixaban (ELIQUIS) 5 mg, Take 1 tablet (5 mg total) by mouth every 12 (twelve) hours (Patient taking differently: Take by mouth every 12 (twelve) hours), Disp: 180 tablet, Rfl: 3    b complex-vitamin C-folic acid (NEPHROCAPS) 1 mg capsule, Take 1 capsule by mouth in the morning , Disp: 90 capsule, Rfl: 3    Dupilumab (Dupixent) 300 MG/2ML SOPN, Inject 300 mg under the skin Once every 2 weeks, Disp: , Rfl:     famotidine (PEPCID) 40 MG tablet, TAKE 1 TABLET BY MOUTH EVERY DAY, Disp: 90 tablet, Rfl: 3    Insulin Degludec-Liraglutide (Xultophy) 100 units-3 6 mg/mL injection pen, Inject 19 units daily  , Disp: 18 mL, Rfl: 0    Insulin Pen Needle (BD Pen Needle Zenaida U/F) 32G X 4 MM MISC, Use 1 daily, Disp: 100 each, Rfl: 2    metoprolol tartrate (LOPRESSOR) 50 mg tablet, Take 1 tablet (50 mg total) by mouth every 12 (twelve) hours, Disp: 180 tablet, Rfl: 3    omega-3-acid ethyl esters (LOVAZA) 1 g capsule, Take 2 capsules (2 g total) by mouth 2 (two) times a day Mail print prescription to pt, Disp: 360 capsule, Rfl: 3    OneTouch Ultra test strip, USE TO TEST BLOOD SUGAR 3 TIMES A DAY, Disp: 100 each, Rfl: 3    potassium chloride (K-DUR,KLOR-CON) 10 mEq tablet, Take 2 tablets (20 mEq total) by mouth daily, Disp: 60 tablet, Rfl: 3    sevelamer carbonate (RENVELA) 800 mg tablet, Take 1 tablet (800 mg total) by mouth in the morning and 1 tablet (800 mg total) at noon and 1 tablet (800 mg total) in the evening  Take with meals  , Disp: 270 tablet, Rfl: 3    simvastatin (ZOCOR) 20 mg tablet, Take 1 tablet (20 mg total) by mouth daily at bedtime, Disp: 90 tablet, Rfl: 3    sodium bicarbonate 650 mg tablet, Take 1 tablet (650 mg total) by mouth 2 (two) times daily after meals, Disp: 180 tablet, Rfl: 3    tamsulosin (FLOMAX) 0 4 mg, TAKE 1 CAPSULE BY MOUTH EVERY DAY WITH DINNER, Disp: 90 capsule, Rfl: 3    torsemide (DEMADEX) 20 mg tablet, Take 2 tablets (40 mg total) by mouth 2 (two) times a day 40mg am and 20mg pm, Disp: 270 tablet, Rfl: 3    ondansetron (ZOFRAN) 8 mg tablet, Take 1 tablet (8 mg total) by mouth every 8 (eight) hours as needed for nausea or vomiting (Patient not taking: No sig reported), Disp: 20 tablet, Rfl: 3      Physical Exam:  /70 (BP Location: Right arm, Patient Position: Sitting, Cuff Size: Adult)   Pulse 82   Temp 97 7 °F (36 5 °C)   Resp 18   Ht 5' 10" (1 778 m)   Wt 87 5 kg (193 lb)   SpO2 98%   BMI 27 69 kg/m²     Physical Exam  Vitals reviewed  Constitutional:       General: He is not in acute distress  Appearance: He is well-developed  He is not diaphoretic  HENT:      Head: Normocephalic and atraumatic  Eyes:      General: Lids are normal  No scleral icterus  Conjunctiva/sclera: Conjunctivae normal       Pupils: Pupils are equal, round, and reactive to light  Neck:      Thyroid: No thyromegaly  Cardiovascular:      Rate and Rhythm: Normal rate and regular rhythm  Heart sounds: Murmur heard  Systolic murmur is present  Comments: Dialysis catheter right chest wall  New AV fistual left forearm healing scabbed- also additional scabbed lesion to left wrist  Pulmonary:      Effort: Pulmonary effort is normal  No respiratory distress  Breath sounds: Normal breath sounds  Chest:   Breasts:      Right: No axillary adenopathy  Left: No axillary adenopathy  Abdominal:      General: There is no distension  Palpations: Abdomen is soft  There is no hepatomegaly or splenomegaly        Tenderness: There is no abdominal tenderness  Musculoskeletal:         General: Swelling (trace BLE) present  Normal range of motion  Cervical back: Normal range of motion and neck supple  Lymphadenopathy:      Cervical: No cervical adenopathy  Upper Body:      Right upper body: No axillary adenopathy  Left upper body: No axillary adenopathy  Skin:     General: Skin is warm and dry  Coloration: Skin is pale  Findings: No erythema or rash  Neurological:      General: No focal deficit present  Mental Status: He is alert and oriented to person, place, and time  Psychiatric:         Mood and Affect: Mood and affect normal  Mood is not depressed  Behavior: Behavior normal  Behavior is cooperative  Thought Content: Thought content normal          Judgment: Judgment normal            Labs:  Lab Results   Component Value Date    WBC 10 13 06/13/2022    HGB 10 2 (L) 06/13/2022    HCT 31 0 (L) 06/13/2022    MCV 97 06/13/2022     06/13/2022     Lab Results   Component Value Date     (L) 04/10/2017    K 4 4 06/13/2022     06/13/2022    CO2 27 06/13/2022    BUN 43 (H) 06/13/2022    CREATININE 3 98 (H) 06/13/2022    GLUCOSE 142 (H) 11/01/2018    GLUF 130 (H) 06/13/2022    CALCIUM 9 5 06/13/2022    CORRECTEDCA 10 3 (H) 06/13/2022    AST 24 06/13/2022    ALT 27 06/13/2022    ALKPHOS 142 (H) 06/13/2022    PROT 6 9 04/10/2017    BILITOT 0 7 04/10/2017    EGFR 14 06/13/2022       Patient voiced understanding and agreement in the above discussion  Aware to contact our office with questions/symptoms in the interim  This note has been generated by voice recognition software system  Therefore, there may be spelling, grammar, and or syntax errors  Please contact if questions arise

## 2022-06-17 ENCOUNTER — OFFICE VISIT (OUTPATIENT)
Dept: VASCULAR SURGERY | Facility: CLINIC | Age: 67
End: 2022-06-17

## 2022-06-17 VITALS
BODY MASS INDEX: 28.03 KG/M2 | SYSTOLIC BLOOD PRESSURE: 130 MMHG | WEIGHT: 195.8 LBS | TEMPERATURE: 97.6 F | HEIGHT: 70 IN | DIASTOLIC BLOOD PRESSURE: 70 MMHG | HEART RATE: 80 BPM | RESPIRATION RATE: 20 BRPM | OXYGEN SATURATION: 92 %

## 2022-06-17 DIAGNOSIS — Z98.890 S/P ARTERIOVENOUS (AV) FISTULA CREATION: Primary | ICD-10-CM

## 2022-06-17 PROCEDURE — 99024 POSTOP FOLLOW-UP VISIT: CPT

## 2022-06-17 NOTE — PROGRESS NOTES
Assessment/Plan:    S/P arteriovenous (AV) fistula creation  46VYC with PMHx of DM II, NICOLASA, COPD, colon cancer, PAD, a fib (eliquis), SSS, pulmonary HTN, dCHF, CKD stage 4 on HD MWF via permacath s/p LUE radiocephalic AVF on 7/8/63 by Dr Taryn Carmona  Pt presents today for post op visit  Assessment:   -Patient doing well post-operatively  Denies any acute complaints today    -+thrill, +bruit on exam  -Motor/sensory intact without evidence of steal   -Incision sites are clean, dry, intact without evidence of local infection  Mild swelling of left arm on exam      Recommendations:   -Will obtain HD duplex at 5 weeks post op to assess AVF function and maturation    -On eliquis for afib  Admits to easy bruising    -Continue statin therapy   -Discussed incision care with patient    -Continue to monitor for s/sx of infection and instructed patient to contact the office if symptoms occur  -F/u after HD duplex  -Instructed patient to call the office with questions, concerns, or new symptoms  Discussed the importance of checking for a thrill in AVF daily and to contact the office if there are any issues  Patient denies any pain or numbness  Diagnoses and all orders for this visit:    S/P arteriovenous (AV) fistula creation  -     VAS hemodialysis access duplex left upper limb avf; Future          Subjective:      Patient ID: Rojelio Lantigua is a 74 y o  male  HPI  Rojelio Lantigua presents today with his wife for post op visit following LUE radiocephalic AVF creation on 7/2/19 by Dr Taryn Carmona for stage 4 CKD on HD MWF via permacath  Pt received HD today  He is doing well with no ongoing issues  He denies pain, erythema, drainage from incision sites  He admits to mild swelling in LUE but not bothersome  He states he checks the fistula daily for thrills  We will obtain an hemodialysis duplex at 5 weeks post op and have him follow up with a surgeon after to discuss   We discussed incision care today, wash incision with gentle soap and water  Keep incisions clean and dry  He was instructed to contact the office in the interim with questions, concerns, or new symptoms  He is on eliquis for a fib and statin therapy  He denies bleeding episodes but admits to easy bruising  The following portions of the patient's history were reviewed and updated as appropriate: allergies, current medications, past family history, past medical history, past social history, past surgical history and problem list     Review of Systems   Constitutional: Negative  HENT: Negative  Eyes: Negative  Respiratory: Negative  Cardiovascular: Negative  Gastrointestinal: Negative  Endocrine: Negative  Genitourinary: Negative  Musculoskeletal: Negative  Skin: Negative  Allergic/Immunologic: Negative  Neurological: Negative  Hematological: Negative  Psychiatric/Behavioral: Negative            Objective:      Vitals:    06/17/22 1511   BP: 130/70   Pulse: 80   Resp: 20   Temp: 97 6 °F (36 4 °C)   SpO2: 92%   Weight: 88 8 kg (195 lb 12 8 oz)   Height: 5' 10" (1 778 m)       Patient Active Problem List   Diagnosis    Bilateral leg edema    Diabetic ulcer of toe of left foot associated with type 2 diabetes mellitus (HCC)    Easy bruising    Eczema    Erectile dysfunction of non-organic origin    Gout    Hyperlipidemia    Uremic acidosis    Arthritis    Peripheral arterial disease (HCC)    PVCs (premature ventricular contractions)    Rhinitis    Systolic murmur    Vertigo    Complete heart block (HCC)    Metabolic acidosis    ELZBIETA (acute kidney injury) (HCC)    Other hyperlipidemia    Persistent atrial fibrillation (HCC)    Persistent proteinuria    Volume overload    Urinary retention    NICOLASA (obstructive sleep apnea)    Type 2 diabetes mellitus with chronic kidney disease, with long-term current use of insulin (HCC)    Hypertension    Stage 4 chronic kidney disease (HCC)    Nonrheumatic aortic valve stenosis    Anemia    Fever    Mitral stenosis    Abnormal CT of the chest    Hyperkalemia    Acute pulmonary edema (HCC)    Chronic diastolic (congestive) heart failure (HCC)    Left renal artery stenosis (HCC)    S/P amputation of lesser toe, left (HCC)    S/P amputation of lesser toe, right (HCC)    Elevated troponin I level    Staphylococcus aureus bacteremia    Type 2 diabetes mellitus with diabetic neuropathy, without long-term current use of insulin (HCC)    Hyponatremia    Iron deficiency anemia    Anxiety    Osteomyelitis of left foot (HCC)    Amputation of left great toe (HCC)    Multiple drug resistant organism (MDRO) culture positive    Renovascular hypertension    SSS (sick sinus syndrome) (HCC)    Chronic obstructive pulmonary disease (HCC)    Absence of toe (HCC)    Chronic painful diabetic neuropathy (HCC)    Onychomycosis    Colon cancer (HCC)    Acute kidney injury superimposed on chronic kidney disease (HCC)    RSV (respiratory syncytial virus pneumonia)    Chemotherapy-induced neutropenia (HCC)    Chemotherapy-induced nausea    Acute on chronic diastolic congestive heart failure (HCC)    Other iron deficiency anemias    Pulmonary hypertension (HCC)    S/P arteriovenous (AV) fistula creation       Past Surgical History:   Procedure Laterality Date    CARDIAC PACEMAKER PLACEMENT      CARPAL TUNNEL RELEASE Left     CARPAL TUNNEL RELEASE Right     CARPAL TUNNEL RELEASE      COLONOSCOPY  08/2020    COLONOSCOPY      FL GUIDED CENTRAL VENOUS ACCESS DEVICE INSERTION  01/11/2022    FOOT AMPUTATION Left 02/10/2020    Procedure: PARTIAL 1ST RAY AMPUTATION;  Surgeon: Zachary Ramos DPM;  [facility];  Service: Podiatry    INCISION AND DRAINAGE OF WOUND Left 01/12/2020    Procedure: INCISION AND DRAINAGE (I&D) EXTREMITY AND REMOVAL OF SESMOID BONE;  Surgeon: Lanette Ramos DPM;  [facility];  Service: Podiatry    IR OTHER  01/21/2022    IR PICC REPOSITION 01/14/2020    IR TUNNELED DIALYSIS CATHETER PLACEMENT  04/26/2022    KNEE ARTHROSCOPY Left     KNEE ARTHROSCOPY Right     KNEE SURGERY      AK AMPUTATION TOE,I-P JT Bilateral 05/02/2017    Procedure: PARTIAL AMPUTATION RIGHT AND LEFT HALLUX ;  Surgeon: Toña Matthew DPM;  [facility];  Service: Podiatry    AK AMPUTATION TOE,MT-P JT Left 07/25/2017    Procedure: 2ND TOE AMPUTATION;  Surgeon: Toña Matthew DPM;  [facility];  Service: Podiatry    AK ANASTOMOSIS,AV,ANY SITE Left 6/1/2022    Procedure: CREATION FISTULA ARTERIOVENOUS (AV) RADIOCEPHALIC;  Surgeon: Adebayo Adam MD;  [facility];  Service: Vascular    AK INSJ TUNNELED CTR VAD W/SUBQ PORT AGE 5 YR/> N/A 01/11/2022    Procedure: INSERTION VENOUS PORT ( PORT-A-CATH) IR;  Surgeon: Gregory Skelton DO;  [facility];  Service: Interventional Radiology    RESECTION LOW ANTERIOR LAPAROSCOPIC N/A 10/21/2021    Procedure: RESECTION LOW ANTERIOR LAPAROSCOPIC;  Surgeon: Anny Alejo MD;  [facility];  Service: Colorectal    SHOULDER ARTHROSCOPY Left     with screws,RTC    SHOULDER SURGERY Left     rotator cuff    TOE AMPUTATION Left 01/08/2020    Procedure: Doreen Yusuf;  Surgeon: Kenrick Dueñas DPM;  [facility];  Service: Podiatry    UPPER GASTROINTESTINAL ENDOSCOPY  03/2020    Intestinal metaplasia prepyloric    VASECTOMY         Family History   Problem Relation Age of Onset    Heart disease Father         passed away    Hypertension Father     Pulmonary embolism Father     Hypertension Mother         assed away       Social History     Socioeconomic History    Marital status: /Civil Union     Spouse name: Not on file    Number of children: Not on file    Years of education: Not on file    Highest education level: Not on file   Occupational History    Not on file   Tobacco Use    Smoking status: Former Smoker     Packs/day: 0 50     Years: 20 00     Pack years: 10 00 Types: Cigarettes     Start date: 1     Quit date:      Years since quittin 4    Smokeless tobacco: Never Used    Tobacco comment: quit 10 years ago   Vaping Use    Vaping Use: Never used   Substance and Sexual Activity    Alcohol use: Never     Alcohol/week: 0 0 standard drinks    Drug use: No    Sexual activity: Not Currently     Partners: Female   Other Topics Concern    Not on file   Social History Narrative    ** Merged History Encounter **         Daily cola consumption (2 cans/day)     Social Determinants of Health     Financial Resource Strain: Not on file   Food Insecurity: No Food Insecurity    Worried About Running Out of Food in the Last Year: Never true    Joel of Food in the Last Year: Never true   Transportation Needs: No Transportation Needs    Lack of Transportation (Medical): No    Lack of Transportation (Non-Medical): No   Physical Activity: Not on file   Stress: Not on file   Social Connections: Not on file   Intimate Partner Violence: Not on file   Housing Stability: Low Risk     Unable to Pay for Housing in the Last Year: No    Number of Places Lived in the Last Year: 1    Unstable Housing in the Last Year: No       Allergies   Allergen Reactions    Invokana [Canagliflozin] Other (See Comments)     Caused circulation problems    Lamisil [Terbinafine] Blisters     Wife states " His skin peeled from head to toe"    Other Swelling     Pomegranate - facial swelling, no swelling of tongue, esophagus  Adhesive tape        Latex Rash         Current Outpatient Medications:     allopurinol (ZYLOPRIM) 300 mg tablet, Take 1 tablet (300 mg total) by mouth every morning, Disp: 90 tablet, Rfl: 0    amLODIPine (NORVASC) 10 mg tablet, Take 1 tablet (10 mg total) by mouth daily, Disp: 90 tablet, Rfl: 3    apixaban (ELIQUIS) 5 mg, Take 1 tablet (5 mg total) by mouth every 12 (twelve) hours (Patient taking differently: Take by mouth every 12 (twelve) hours), Disp: 180 tablet, Rfl: 3    b complex-vitamin C-folic acid (NEPHROCAPS) 1 mg capsule, Take 1 capsule by mouth in the morning , Disp: 90 capsule, Rfl: 3    Dupilumab (Dupixent) 300 MG/2ML SOPN, Inject 300 mg under the skin Once every 2 weeks, Disp: , Rfl:     Insulin Degludec-Liraglutide (Xultophy) 100 units-3 6 mg/mL injection pen, Inject 19 units daily  , Disp: 18 mL, Rfl: 0    Insulin Pen Needle (BD Pen Needle Zenaida U/F) 32G X 4 MM MISC, Use 1 daily, Disp: 100 each, Rfl: 2    metoprolol tartrate (LOPRESSOR) 50 mg tablet, Take 1 tablet (50 mg total) by mouth every 12 (twelve) hours, Disp: 180 tablet, Rfl: 3    omega-3-acid ethyl esters (LOVAZA) 1 g capsule, Take 2 capsules (2 g total) by mouth 2 (two) times a day Mail print prescription to pt, Disp: 360 capsule, Rfl: 3    OneTouch Ultra test strip, USE TO TEST BLOOD SUGAR 3 TIMES A DAY, Disp: 100 each, Rfl: 3    potassium chloride (K-DUR,KLOR-CON) 10 mEq tablet, Take 2 tablets (20 mEq total) by mouth daily, Disp: 60 tablet, Rfl: 3    sevelamer carbonate (RENVELA) 800 mg tablet, Take 1 tablet (800 mg total) by mouth in the morning and 1 tablet (800 mg total) at noon and 1 tablet (800 mg total) in the evening  Take with meals  , Disp: 270 tablet, Rfl: 3    simvastatin (ZOCOR) 20 mg tablet, Take 1 tablet (20 mg total) by mouth daily at bedtime, Disp: 90 tablet, Rfl: 3    sodium bicarbonate 650 mg tablet, Take 1 tablet (650 mg total) by mouth 2 (two) times daily after meals, Disp: 180 tablet, Rfl: 3    tamsulosin (FLOMAX) 0 4 mg, TAKE 1 CAPSULE BY MOUTH EVERY DAY WITH DINNER, Disp: 90 capsule, Rfl: 3    torsemide (DEMADEX) 20 mg tablet, Take 2 tablets (40 mg total) by mouth 2 (two) times a day 40mg am and 20mg pm, Disp: 270 tablet, Rfl: 3    famotidine (PEPCID) 40 MG tablet, TAKE 1 TABLET BY MOUTH EVERY DAY, Disp: 90 tablet, Rfl: 3    ondansetron (ZOFRAN) 8 mg tablet, Take 1 tablet (8 mg total) by mouth every 8 (eight) hours as needed for nausea or vomiting (Patient not taking: No sig reported), Disp: 20 tablet, Rfl: 3  No current facility-administered medications for this visit  Facility-Administered Medications Ordered in Other Visits:     dextrose 5 % infusion, 20 mL/hr, Intravenous, Once, Queenie Rosario MD    sodium chloride 0 9 % infusion, 20 mL/hr, Intravenous, Once PRN, Queenie Rosario MD, Stopped at 06/14/22 1214      /70   Pulse 80   Temp 97 6 °F (36 4 °C)   Resp 20   Ht 5' 10" (1 778 m)   Wt 88 8 kg (195 lb 12 8 oz)   SpO2 92%   BMI 28 09 kg/m²          Physical Exam  Vitals and nursing note reviewed  Constitutional:       Appearance: Normal appearance  HENT:      Head: Normocephalic and atraumatic  Eyes:      Extraocular Movements: Extraocular movements intact  Pupils: Pupils are equal, round, and reactive to light  Neck:      Vascular: No carotid bruit  Cardiovascular:      Rate and Rhythm: Normal rate  Pulses:           Radial pulses are 2+ on the right side and 2+ on the left side  Heart sounds: Normal heart sounds  Arteriovenous access: left arteriovenous access is present  Comments: No carotid bruit   Palpable ulnar pulse  +thrill +bruit in LUE AVF  Pulmonary:      Effort: Pulmonary effort is normal  No respiratory distress  Breath sounds: Normal breath sounds  Abdominal:      General: Bowel sounds are normal  There is no distension  Palpations: Abdomen is soft  Comments: No abdominal bruit or pulsatile masses  Musculoskeletal:         General: Swelling present  Normal range of motion  Cervical back: Normal range of motion and neck supple  Comments: Left arm swelling   Skin:     General: Skin is warm and dry  Capillary Refill: Capillary refill takes less than 2 seconds  Comments: Left UE AVF incisions are clean, dry, intact without evidence of infection  Neurological:      General: No focal deficit present        Mental Status: He is alert and oriented to person, place, and time    Psychiatric:         Mood and Affect: Mood normal          Behavior: Behavior normal        Ariane Betts PA-C  [facility]  [phone]

## 2022-06-17 NOTE — PATIENT INSTRUCTIONS
AV fistula  -Your fistula incisions are healing nicely  Please continue to monitor for signs and symptoms of infection including increased pain, redness, swelling, drainage, fever or chills  -Please try to elevate the arm when able to assist with swelling   -We will get an updated ultrasound at 5 weeks post op to assess the function of the fistula  -You will follow up with Dr Aparna Perez at that time to review    -Please call our office with questions, concerns, or new symptoms  Arteriovenous Fistula Creation for Hemodialysis   WHAT YOU SHOULD KNOW:   An arteriovenous fistula (AVF) is a surgical connection of an artery to a vein  This is a common procedure for hemodialysis  The fistula is usually done on the nondominant arm  For example, if you are right-handed, the AVF will be created on your left arm  Blood will go out from and come back to the AVF after it is cleaned by the hemodialysis machine  AFTER YOU LEAVE:   Medicines: You may be given prescription pain medicine or antibiotics  Do not wait until the pain is severe before you take your pain medicine  Antibiotics fight or prevent an infection caused by bacteria  Take your medicine as directed  Contact your primary healthcare provider (PHP) if you think your medicine is not helping or you have side effects  Tell him if you are allergic to any medicine  Keep a list of the medicines, vitamins, and herbs you take  Include the amounts, and when and why you take them  Bring the list or the pill bottles to follow-up visits  Carry your medicine list with you in case of an emergency  Follow up with your PHP as directed:  Ask your PHP when you need to return to have your AVF checked  Write down your questions so you remember to ask them during your visits  Care for your AVF:  Your incision will be closed with either stitches or thin strips of tape  Ask when you can bathe  If you have stitches, carefully wash your stitches with soap and water   Pat them dry with a clean towel  If your incision was closed with thin strips of tape, keep it clean and dry  As the strips of tape start to peel off, let them fall off by themselves  Do not pull them off  You may remove the bandage that covers your AVF 4 to 6 hours after dialysis  Check your AVF every day for good blood flow by touching it with your fingertips  The buzzing sensation means that it is working  Check for bleeding, pain, redness, or swelling  These may be signs of infection or a clogged AVF  To prevent damage to the AVF, no one should take your blood pressure or draw blood from the arm with the AVF  Do not wear tight clothes or jewelry  Do not sleep on that arm  Contact your PHP if:   You have a fever  Your skin is itchy, swollen, or has a rash  You have questions or concerns about your condition or care  Seek care immediately or call 911 if: You feel lightheaded, short of breath, and have chest pain  You cough up blood  Your AVF site has blood, pus, or a foul-smelling odor  You have increased pain in the area where the AVF was made  © 2014 [address] is for End User's use only and may not be sold, redistributed or otherwise used for commercial purposes  All illustrations and images included in CareNotes® are the copyrighted property of A D A M [employer]  or Buster Giraldo  The above information is an  only  It is not intended as medical advice for individual conditions or treatments  Talk to your doctor, nurse or pharmacist before following any medical regimen to see if it is safe and effective for you

## 2022-06-17 NOTE — ASSESSMENT & PLAN NOTE
67yoM with PMHx of DM II, NICOLASA, COPD, colon cancer, PAD, a fib (eliquis), SSS, pulmonary HTN, dCHF, CKD stage 4 on HD MWF via permacath s/p LUE radiocephalic AVF on 3/1/75 by Dr Sergio Pedersen  Pt presents today for post op visit  Assessment:   -Patient doing well post-operatively  Denies any acute complaints today    -+thrill, +bruit on exam  -Motor/sensory intact without evidence of steal   -Incision sites are clean, dry, intact without evidence of local infection  Mild swelling of left arm on exam      Recommendations:   -Will obtain HD duplex at 5 weeks post op to assess AVF function and maturation    -On eliquis for afib  Admits to easy bruising    -Continue statin therapy   -Discussed incision care with patient    -Continue to monitor for s/sx of infection and instructed patient to contact the office if symptoms occur  -F/u after HD duplex  -Instructed patient to call the office with questions, concerns, or new symptoms  Discussed the importance of checking for a thrill in AVF daily and to contact the office if there are any issues

## 2022-06-20 ENCOUNTER — APPOINTMENT (OUTPATIENT)
Dept: LAB | Facility: CLINIC | Age: 67
End: 2022-06-20
Payer: MEDICARE

## 2022-06-20 DIAGNOSIS — C18.7 MALIGNANT NEOPLASM OF SIGMOID COLON (HCC): ICD-10-CM

## 2022-06-20 DIAGNOSIS — D64.9 NORMOCYTIC ANEMIA: ICD-10-CM

## 2022-06-20 LAB
ALBUMIN SERPL BCP-MCNC: 3.2 G/DL (ref 3.5–5)
ALP SERPL-CCNC: 121 U/L (ref 46–116)
ALT SERPL W P-5'-P-CCNC: 38 U/L (ref 12–78)
ANION GAP SERPL CALCULATED.3IONS-SCNC: 6 MMOL/L (ref 4–13)
ANISOCYTOSIS BLD QL SMEAR: PRESENT
AST SERPL W P-5'-P-CCNC: 25 U/L (ref 5–45)
BASOPHILS # BLD MANUAL: 0.12 THOUSAND/UL (ref 0–0.1)
BASOPHILS NFR MAR MANUAL: 1 % (ref 0–1)
BILIRUB SERPL-MCNC: 0.85 MG/DL (ref 0.2–1)
BUN SERPL-MCNC: 53 MG/DL (ref 5–25)
CALCIUM ALBUM COR SERPL-MCNC: 10.1 MG/DL (ref 8.3–10.1)
CALCIUM SERPL-MCNC: 9.5 MG/DL (ref 8.3–10.1)
CHLORIDE SERPL-SCNC: 104 MMOL/L (ref 100–108)
CO2 SERPL-SCNC: 25 MMOL/L (ref 21–32)
CREAT SERPL-MCNC: 3.56 MG/DL (ref 0.6–1.3)
CRP SERPL QL: 4.1 MG/L
EOSINOPHIL # BLD MANUAL: 0.48 THOUSAND/UL (ref 0–0.4)
EOSINOPHIL NFR BLD MANUAL: 4 % (ref 0–6)
ERYTHROCYTE [DISTWIDTH] IN BLOOD BY AUTOMATED COUNT: 16.9 % (ref 11.6–15.1)
ERYTHROCYTE [SEDIMENTATION RATE] IN BLOOD: 29 MM/HOUR (ref 0–19)
GFR SERPL CREATININE-BSD FRML MDRD: 16 ML/MIN/1.73SQ M
GLUCOSE P FAST SERPL-MCNC: 160 MG/DL (ref 65–99)
HCT VFR BLD AUTO: 31.1 % (ref 36.5–49.3)
HELMET CELLS BLD QL SMEAR: PRESENT
HGB BLD-MCNC: 10.2 G/DL (ref 12–17)
LDH SERPL-CCNC: 285 U/L (ref 81–234)
LYMPHOCYTES # BLD AUTO: 0.6 THOUSAND/UL (ref 0.6–4.47)
LYMPHOCYTES # BLD AUTO: 5 % (ref 14–44)
MACROCYTES BLD QL AUTO: PRESENT
MCH RBC QN AUTO: 32.5 PG (ref 26.8–34.3)
MCHC RBC AUTO-ENTMCNC: 32.8 G/DL (ref 31.4–37.4)
MCV RBC AUTO: 99 FL (ref 82–98)
METAMYELOCYTES NFR BLD MANUAL: 1 % (ref 0–1)
MICROCYTES BLD QL AUTO: PRESENT
MONOCYTES # BLD AUTO: 0.6 THOUSAND/UL (ref 0–1.22)
MONOCYTES NFR BLD: 5 % (ref 4–12)
MYELOCYTES NFR BLD MANUAL: 2 % (ref 0–1)
NEUTROPHILS # BLD MANUAL: 9.82 THOUSAND/UL (ref 1.85–7.62)
NEUTS SEG NFR BLD AUTO: 82 % (ref 43–75)
NRBC BLD AUTO-RTO: 3 /100 WBC (ref 0–2)
OVALOCYTES BLD QL SMEAR: PRESENT
PLATELET # BLD AUTO: 293 THOUSANDS/UL (ref 149–390)
PLATELET BLD QL SMEAR: ADEQUATE
PMV BLD AUTO: 10.1 FL (ref 8.9–12.7)
POIKILOCYTOSIS BLD QL SMEAR: PRESENT
POLYCHROMASIA BLD QL SMEAR: PRESENT
POTASSIUM SERPL-SCNC: 4.9 MMOL/L (ref 3.5–5.3)
PROT SERPL-MCNC: 7.2 G/DL (ref 6.4–8.2)
RBC # BLD AUTO: 3.14 MILLION/UL (ref 3.88–5.62)
RBC MORPH BLD: PRESENT
SCHISTOCYTES BLD QL SMEAR: PRESENT
SODIUM SERPL-SCNC: 135 MMOL/L (ref 136–145)
WBC # BLD AUTO: 11.97 THOUSAND/UL (ref 4.31–10.16)

## 2022-06-20 PROCEDURE — 85007 BL SMEAR W/DIFF WBC COUNT: CPT | Performed by: NURSE PRACTITIONER

## 2022-06-20 PROCEDURE — 36415 COLL VENOUS BLD VENIPUNCTURE: CPT | Performed by: NURSE PRACTITIONER

## 2022-06-20 PROCEDURE — 80053 COMPREHEN METABOLIC PANEL: CPT | Performed by: NURSE PRACTITIONER

## 2022-06-20 PROCEDURE — 83615 LACTATE (LD) (LDH) ENZYME: CPT

## 2022-06-20 PROCEDURE — 85027 COMPLETE CBC AUTOMATED: CPT | Performed by: NURSE PRACTITIONER

## 2022-06-20 PROCEDURE — 85652 RBC SED RATE AUTOMATED: CPT

## 2022-06-20 PROCEDURE — 86140 C-REACTIVE PROTEIN: CPT

## 2022-06-21 ENCOUNTER — TELEPHONE (OUTPATIENT)
Dept: CARDIOLOGY CLINIC | Facility: CLINIC | Age: 67
End: 2022-06-21

## 2022-06-21 ENCOUNTER — HOSPITAL ENCOUNTER (OUTPATIENT)
Dept: INFUSION CENTER | Facility: HOSPITAL | Age: 67
Discharge: HOME | End: 2022-06-21
Attending: INTERNAL MEDICINE
Payer: MEDICARE

## 2022-06-21 VITALS
TEMPERATURE: 97.4 F | HEART RATE: 84 BPM | OXYGEN SATURATION: 92 % | RESPIRATION RATE: 18 BRPM | SYSTOLIC BLOOD PRESSURE: 123 MMHG | HEIGHT: 70 IN | DIASTOLIC BLOOD PRESSURE: 58 MMHG | WEIGHT: 198.85 LBS | BODY MASS INDEX: 28.47 KG/M2

## 2022-06-21 DIAGNOSIS — T45.1X5A CHEMOTHERAPY-INDUCED NEUTROPENIA (HCC): ICD-10-CM

## 2022-06-21 DIAGNOSIS — D70.1 CHEMOTHERAPY-INDUCED NEUTROPENIA (HCC): ICD-10-CM

## 2022-06-21 DIAGNOSIS — C18.7 MALIGNANT NEOPLASM OF SIGMOID COLON (HCC): Primary | ICD-10-CM

## 2022-06-21 PROCEDURE — 96367 TX/PROPH/DG ADDL SEQ IV INF: CPT

## 2022-06-21 PROCEDURE — 96409 CHEMO IV PUSH SNGL DRUG: CPT

## 2022-06-21 PROCEDURE — 96366 THER/PROPH/DIAG IV INF ADDON: CPT

## 2022-06-21 RX ORDER — FLUOROURACIL 50 MG/ML
300 INJECTION, SOLUTION INTRAVENOUS ONCE
Status: COMPLETED | OUTPATIENT
Start: 2022-06-21 | End: 2022-06-21

## 2022-06-21 RX ORDER — SODIUM CHLORIDE 9 MG/ML
20 INJECTION, SOLUTION INTRAVENOUS ONCE AS NEEDED
Status: DISCONTINUED | OUTPATIENT
Start: 2022-06-21 | End: 2022-06-25 | Stop reason: HOSPADM

## 2022-06-21 RX ORDER — DEXTROSE MONOHYDRATE 50 MG/ML
20 INJECTION, SOLUTION INTRAVENOUS ONCE
Status: DISCONTINUED | OUTPATIENT
Start: 2022-06-21 | End: 2022-06-25 | Stop reason: HOSPADM

## 2022-06-21 RX ADMIN — SODIUM CHLORIDE 20 ML/HR: 0.9 INJECTION, SOLUTION INTRAVENOUS at 11:20

## 2022-06-21 RX ADMIN — DEXAMETHASONE SODIUM PHOSPHATE: 10 INJECTION, SOLUTION INTRAMUSCULAR; INTRAVENOUS at 11:22

## 2022-06-21 RX ADMIN — FLUOROURACIL 605 MG: 50 INJECTION, SOLUTION INTRAVENOUS at 14:01

## 2022-06-21 RX ADMIN — LEUCOVORIN CALCIUM 600 MG: 100 INJECTION, POWDER, LYOPHILIZED, FOR SUSPENSION INTRAMUSCULAR; INTRAVENOUS at 11:58

## 2022-06-21 NOTE — TELEPHONE ENCOUNTER
Pt lives in [place], [place]   He wanted a new cardiologist closer to his home   I gave him BM Cielo's number and I gave him BM Osbaldo's number to schedule his 3 mth rtn with DR Mitch Hamilton per Dr Tosin Rivera note

## 2022-06-21 NOTE — PLAN OF CARE
Problem: INFECTION - ADULT  Goal: Absence or prevention of progression during hospitalization  Description: INTERVENTIONS:  - Assess and monitor for signs and symptoms of infection  - Monitor lab/diagnostic results  - Monitor all insertion sites, i e  indwelling lines, tubes, and drains  - Monitor endotracheal if appropriate and nasal secretions for changes in amount and color  - [place] appropriate cooling/warming therapies per order  - Administer medications as ordered  - Instruct and encourage patient and family to use good hand hygiene technique  - Identify and instruct in appropriate isolation precautions for identified infection/condition  Outcome: Progressing     Problem: Knowledge Deficit  Goal: Patient/family/caregiver demonstrates understanding of disease process, treatment plan, medications, and discharge instructions  Description: Complete learning assessment and assess knowledge base    Interventions:  - Provide teaching at level of understanding  - Provide teaching via preferred learning methods  Outcome: Progressing

## 2022-06-27 ENCOUNTER — APPOINTMENT (OUTPATIENT)
Dept: LAB | Facility: CLINIC | Age: 67
End: 2022-06-27
Payer: MEDICARE

## 2022-06-28 ENCOUNTER — HOSPITAL ENCOUNTER (OUTPATIENT)
Dept: INFUSION CENTER | Facility: HOSPITAL | Age: 67
Discharge: HOME | End: 2022-06-28
Attending: INTERNAL MEDICINE
Payer: MEDICARE

## 2022-06-28 VITALS
OXYGEN SATURATION: 91 % | RESPIRATION RATE: 18 BRPM | SYSTOLIC BLOOD PRESSURE: 145 MMHG | WEIGHT: 200.62 LBS | HEIGHT: 70 IN | TEMPERATURE: 97.1 F | HEART RATE: 69 BPM | BODY MASS INDEX: 28.72 KG/M2 | DIASTOLIC BLOOD PRESSURE: 68 MMHG

## 2022-06-28 DIAGNOSIS — D70.1 CHEMOTHERAPY-INDUCED NEUTROPENIA (HCC): ICD-10-CM

## 2022-06-28 DIAGNOSIS — T45.1X5A CHEMOTHERAPY-INDUCED NEUTROPENIA (HCC): ICD-10-CM

## 2022-06-28 DIAGNOSIS — C18.7 MALIGNANT NEOPLASM OF SIGMOID COLON (HCC): Primary | ICD-10-CM

## 2022-06-28 PROCEDURE — 96409 CHEMO IV PUSH SNGL DRUG: CPT

## 2022-06-28 PROCEDURE — 96366 THER/PROPH/DIAG IV INF ADDON: CPT

## 2022-06-28 PROCEDURE — 96367 TX/PROPH/DG ADDL SEQ IV INF: CPT

## 2022-06-28 RX ORDER — SODIUM CHLORIDE 9 MG/ML
20 INJECTION, SOLUTION INTRAVENOUS ONCE AS NEEDED
Status: DISCONTINUED | OUTPATIENT
Start: 2022-06-28 | End: 2022-07-02 | Stop reason: HOSPADM

## 2022-06-28 RX ORDER — FLUOROURACIL 50 MG/ML
300 INJECTION, SOLUTION INTRAVENOUS ONCE
Status: COMPLETED | OUTPATIENT
Start: 2022-06-28 | End: 2022-06-28

## 2022-06-28 RX ORDER — DEXTROSE MONOHYDRATE 50 MG/ML
20 INJECTION, SOLUTION INTRAVENOUS ONCE
Status: DISCONTINUED | OUTPATIENT
Start: 2022-06-28 | End: 2022-07-02 | Stop reason: HOSPADM

## 2022-06-28 RX ADMIN — FLUOROURACIL 605 MG: 50 INJECTION, SOLUTION INTRAVENOUS at 14:44

## 2022-06-28 RX ADMIN — LEUCOVORIN CALCIUM 600 MG: 100 INJECTION, POWDER, LYOPHILIZED, FOR SUSPENSION INTRAMUSCULAR; INTRAVENOUS at 12:24

## 2022-06-28 RX ADMIN — DEXAMETHASONE SODIUM PHOSPHATE: 10 INJECTION, SOLUTION INTRAMUSCULAR; INTRAVENOUS at 11:46

## 2022-07-02 ENCOUNTER — APPOINTMENT (OUTPATIENT)
Dept: LAB | Facility: CLINIC | Age: 67
End: 2022-07-02
Payer: MEDICARE

## 2022-07-05 ENCOUNTER — HOSPITAL ENCOUNTER (OUTPATIENT)
Dept: INFUSION CENTER | Facility: HOSPITAL | Age: 67
Discharge: HOME | End: 2022-07-05
Attending: INTERNAL MEDICINE
Payer: MEDICARE

## 2022-07-05 VITALS
WEIGHT: 198.19 LBS | BODY MASS INDEX: 28.37 KG/M2 | DIASTOLIC BLOOD PRESSURE: 58 MMHG | RESPIRATION RATE: 18 BRPM | OXYGEN SATURATION: 91 % | TEMPERATURE: 97.8 F | HEART RATE: 80 BPM | HEIGHT: 70 IN | SYSTOLIC BLOOD PRESSURE: 119 MMHG

## 2022-07-05 DIAGNOSIS — D70.1 CHEMOTHERAPY-INDUCED NEUTROPENIA (HCC): ICD-10-CM

## 2022-07-05 DIAGNOSIS — T45.1X5A CHEMOTHERAPY-INDUCED NEUTROPENIA (HCC): ICD-10-CM

## 2022-07-05 DIAGNOSIS — C18.7 MALIGNANT NEOPLASM OF SIGMOID COLON (HCC): Primary | ICD-10-CM

## 2022-07-05 PROCEDURE — 96366 THER/PROPH/DIAG IV INF ADDON: CPT

## 2022-07-05 PROCEDURE — 96367 TX/PROPH/DG ADDL SEQ IV INF: CPT

## 2022-07-05 PROCEDURE — 96409 CHEMO IV PUSH SNGL DRUG: CPT

## 2022-07-05 RX ORDER — DEXTROSE MONOHYDRATE 50 MG/ML
20 INJECTION, SOLUTION INTRAVENOUS ONCE
Status: DISCONTINUED | OUTPATIENT
Start: 2022-07-05 | End: 2022-07-09 | Stop reason: HOSPADM

## 2022-07-05 RX ORDER — SODIUM CHLORIDE 9 MG/ML
20 INJECTION, SOLUTION INTRAVENOUS ONCE AS NEEDED
Status: DISCONTINUED | OUTPATIENT
Start: 2022-07-05 | End: 2022-07-09 | Stop reason: HOSPADM

## 2022-07-05 RX ORDER — FLUOROURACIL 50 MG/ML
300 INJECTION, SOLUTION INTRAVENOUS ONCE
Status: COMPLETED | OUTPATIENT
Start: 2022-07-05 | End: 2022-07-05

## 2022-07-05 RX ADMIN — DEXAMETHASONE SODIUM PHOSPHATE: 10 INJECTION, SOLUTION INTRAMUSCULAR; INTRAVENOUS at 12:02

## 2022-07-05 RX ADMIN — LEUCOVORIN CALCIUM 600 MG: 100 INJECTION, POWDER, LYOPHILIZED, FOR SUSPENSION INTRAMUSCULAR; INTRAVENOUS at 12:29

## 2022-07-05 RX ADMIN — FLUOROURACIL 605 MG: 50 INJECTION, SOLUTION INTRAVENOUS at 14:55

## 2022-07-07 ENCOUNTER — HOSPITAL ENCOUNTER (INPATIENT)
Facility: HOSPITAL | Age: 67
LOS: 10 days | Discharge: HOME | DRG: 853 | End: 2022-07-17
Attending: EMERGENCY MEDICINE | Admitting: INTERNAL MEDICINE
Payer: MEDICARE

## 2022-07-07 ENCOUNTER — APPOINTMENT (EMERGENCY)
Dept: RADIOLOGY | Facility: HOSPITAL | Age: 67
DRG: 853 | End: 2022-07-07
Payer: MEDICARE

## 2022-07-07 ENCOUNTER — OFFICE VISIT (OUTPATIENT)
Dept: HEMATOLOGY ONCOLOGY | Facility: CLINIC | Age: 67
End: 2022-07-07
Payer: MEDICARE

## 2022-07-07 VITALS
DIASTOLIC BLOOD PRESSURE: 66 MMHG | OXYGEN SATURATION: 97 % | HEART RATE: 68 BPM | SYSTOLIC BLOOD PRESSURE: 108 MMHG | TEMPERATURE: 102.3 F | RESPIRATION RATE: 20 BRPM

## 2022-07-07 DIAGNOSIS — A41.9 SEVERE SEPSIS (HCC): Primary | ICD-10-CM

## 2022-07-07 DIAGNOSIS — C18.7 MALIGNANT NEOPLASM OF SIGMOID COLON (HCC): Primary | ICD-10-CM

## 2022-07-07 DIAGNOSIS — L97.524 DIABETIC ULCER OF OTHER PART OF LEFT FOOT ASSOCIATED WITH TYPE 2 DIABETES MELLITUS, WITH NECROSIS OF BONE (HCC): ICD-10-CM

## 2022-07-07 DIAGNOSIS — R78.81 GRAM-POSITIVE BACTEREMIA: ICD-10-CM

## 2022-07-07 DIAGNOSIS — L03.116 CELLULITIS OF LEFT FOOT: ICD-10-CM

## 2022-07-07 DIAGNOSIS — N18.4 STAGE 4 CHRONIC KIDNEY DISEASE (HCC): ICD-10-CM

## 2022-07-07 DIAGNOSIS — R65.20 SEVERE SEPSIS (HCC): Primary | ICD-10-CM

## 2022-07-07 DIAGNOSIS — Z95.0 PACEMAKER: ICD-10-CM

## 2022-07-07 DIAGNOSIS — E11.621 DIABETIC ULCER OF OTHER PART OF LEFT FOOT ASSOCIATED WITH TYPE 2 DIABETES MELLITUS, WITH NECROSIS OF BONE (HCC): ICD-10-CM

## 2022-07-07 PROBLEM — E87.20 SEVERE SEPSIS WITH LACTIC ACIDOSIS (HCC): Status: ACTIVE | Noted: 2022-07-07

## 2022-07-07 PROBLEM — E87.2 SEVERE SEPSIS WITH LACTIC ACIDOSIS (HCC): Status: ACTIVE | Noted: 2022-07-07

## 2022-07-07 LAB
ALBUMIN SERPL BCP-MCNC: 3.5 G/DL (ref 3.5–5)
ALP SERPL-CCNC: 93 U/L (ref 34–104)
ALT SERPL W P-5'-P-CCNC: 10 U/L (ref 7–52)
ANION GAP SERPL CALCULATED.3IONS-SCNC: 12 MMOL/L (ref 4–13)
APTT PPP: 38 SECONDS (ref 23–37)
AST SERPL W P-5'-P-CCNC: 9 U/L (ref 13–39)
ATRIAL RATE: 267 BPM
BASOPHILS # BLD AUTO: 0.01 THOUSANDS/ΜL (ref 0–0.1)
BASOPHILS NFR BLD AUTO: 0 % (ref 0–1)
BILIRUB SERPL-MCNC: 1.1 MG/DL (ref 0.2–1)
BUN SERPL-MCNC: 37 MG/DL (ref 5–25)
CALCIUM SERPL-MCNC: 8.9 MG/DL (ref 8.4–10.2)
CHLORIDE SERPL-SCNC: 90 MMOL/L (ref 96–108)
CO2 SERPL-SCNC: 28 MMOL/L (ref 21–32)
CREAT SERPL-MCNC: 3.53 MG/DL (ref 0.6–1.3)
EOSINOPHIL # BLD AUTO: 0 THOUSAND/ΜL (ref 0–0.61)
EOSINOPHIL NFR BLD AUTO: 0 % (ref 0–6)
ERYTHROCYTE [DISTWIDTH] IN BLOOD BY AUTOMATED COUNT: 17.1 % (ref 11.6–15.1)
FLUAV RNA RESP QL NAA+PROBE: NEGATIVE
FLUBV RNA RESP QL NAA+PROBE: NEGATIVE
GFR SERPL CREATININE-BSD FRML MDRD: 16 ML/MIN/1.73SQ M
GLUCOSE SERPL-MCNC: 177 MG/DL (ref 65–140)
GLUCOSE SERPL-MCNC: 182 MG/DL (ref 65–140)
GLUCOSE SERPL-MCNC: 232 MG/DL (ref 65–140)
HCT VFR BLD AUTO: 27.5 % (ref 36.5–49.3)
HGB BLD-MCNC: 9.1 G/DL (ref 12–17)
IMM GRANULOCYTES # BLD AUTO: 0.11 THOUSAND/UL (ref 0–0.2)
IMM GRANULOCYTES NFR BLD AUTO: 1 % (ref 0–2)
INR PPP: 2.15 (ref 0.84–1.19)
LACTATE SERPL-SCNC: 1.9 MMOL/L (ref 0.5–2)
LACTATE SERPL-SCNC: 3.2 MMOL/L (ref 0.5–2)
LYMPHOCYTES # BLD AUTO: 0.62 THOUSANDS/ΜL (ref 0.6–4.47)
LYMPHOCYTES NFR BLD AUTO: 4 % (ref 14–44)
MCH RBC QN AUTO: 33.7 PG (ref 26.8–34.3)
MCHC RBC AUTO-ENTMCNC: 33.1 G/DL (ref 31.4–37.4)
MCV RBC AUTO: 102 FL (ref 82–98)
MONOCYTES # BLD AUTO: 1.06 THOUSAND/ΜL (ref 0.17–1.22)
MONOCYTES NFR BLD AUTO: 6 % (ref 4–12)
NEUTROPHILS # BLD AUTO: 14.74 THOUSANDS/ΜL (ref 1.85–7.62)
NEUTS SEG NFR BLD AUTO: 89 % (ref 43–75)
NRBC BLD AUTO-RTO: 0 /100 WBCS
PLATELET # BLD AUTO: 215 THOUSANDS/UL (ref 149–390)
PMV BLD AUTO: 9.4 FL (ref 8.9–12.7)
POTASSIUM SERPL-SCNC: 4.8 MMOL/L (ref 3.5–5.3)
PROCALCITONIN SERPL-MCNC: 0.92 NG/ML
PROT SERPL-MCNC: 6.8 G/DL (ref 6.4–8.4)
PROTHROMBIN TIME: 23.5 SECONDS (ref 11.6–14.5)
QRS AXIS: -73 DEGREES
QRSD INTERVAL: 168 MS
QT INTERVAL: 448 MS
QTC INTERVAL: 454 MS
RBC # BLD AUTO: 2.7 MILLION/UL (ref 3.88–5.62)
RSV RNA RESP QL NAA+PROBE: NEGATIVE
SARS-COV-2 RNA RESP QL NAA+PROBE: NEGATIVE
SODIUM SERPL-SCNC: 130 MMOL/L (ref 135–147)
T WAVE AXIS: 80 DEGREES
VENTRICULAR RATE: 62 BPM
WBC # BLD AUTO: 16.54 THOUSAND/UL (ref 4.31–10.16)

## 2022-07-07 PROCEDURE — 80053 COMPREHEN METABOLIC PANEL: CPT | Performed by: EMERGENCY MEDICINE

## 2022-07-07 PROCEDURE — 87186 SC STD MICRODIL/AGAR DIL: CPT | Performed by: EMERGENCY MEDICINE

## 2022-07-07 PROCEDURE — 93005 ELECTROCARDIOGRAM TRACING: CPT

## 2022-07-07 PROCEDURE — 83605 ASSAY OF LACTIC ACID: CPT | Performed by: EMERGENCY MEDICINE

## 2022-07-07 PROCEDURE — 82948 REAGENT STRIP/BLOOD GLUCOSE: CPT

## 2022-07-07 PROCEDURE — 99285 EMERGENCY DEPT VISIT HI MDM: CPT | Performed by: EMERGENCY MEDICINE

## 2022-07-07 PROCEDURE — 71045 X-RAY EXAM CHEST 1 VIEW: CPT

## 2022-07-07 PROCEDURE — 96365 THER/PROPH/DIAG IV INF INIT: CPT

## 2022-07-07 PROCEDURE — 85730 THROMBOPLASTIN TIME PARTIAL: CPT | Performed by: EMERGENCY MEDICINE

## 2022-07-07 PROCEDURE — 36415 COLL VENOUS BLD VENIPUNCTURE: CPT | Performed by: EMERGENCY MEDICINE

## 2022-07-07 PROCEDURE — 87040 BLOOD CULTURE FOR BACTERIA: CPT | Performed by: EMERGENCY MEDICINE

## 2022-07-07 PROCEDURE — 83605 ASSAY OF LACTIC ACID: CPT | Performed by: INTERNAL MEDICINE

## 2022-07-07 PROCEDURE — 0241U HB NFCT DS VIR RESP RNA 4 TRGT: CPT | Performed by: EMERGENCY MEDICINE

## 2022-07-07 PROCEDURE — 99214 OFFICE O/P EST MOD 30 MIN: CPT | Performed by: INTERNAL MEDICINE

## 2022-07-07 PROCEDURE — 84145 PROCALCITONIN (PCT): CPT | Performed by: EMERGENCY MEDICINE

## 2022-07-07 PROCEDURE — 87154 CUL TYP ID BLD PTHGN 6+ TRGT: CPT | Performed by: EMERGENCY MEDICINE

## 2022-07-07 PROCEDURE — 85025 COMPLETE CBC W/AUTO DIFF WBC: CPT | Performed by: EMERGENCY MEDICINE

## 2022-07-07 PROCEDURE — 85610 PROTHROMBIN TIME: CPT | Performed by: EMERGENCY MEDICINE

## 2022-07-07 PROCEDURE — 93010 ELECTROCARDIOGRAM REPORT: CPT | Performed by: INTERNAL MEDICINE

## 2022-07-07 PROCEDURE — 73630 X-RAY EXAM OF FOOT: CPT

## 2022-07-07 PROCEDURE — 99285 EMERGENCY DEPT VISIT HI MDM: CPT

## 2022-07-07 PROCEDURE — 99223 1ST HOSP IP/OBS HIGH 75: CPT | Performed by: INTERNAL MEDICINE

## 2022-07-07 RX ORDER — SODIUM BICARBONATE 650 MG/1
650 TABLET ORAL
Status: DISCONTINUED | OUTPATIENT
Start: 2022-07-07 | End: 2022-07-08

## 2022-07-07 RX ORDER — CEFEPIME HYDROCHLORIDE 2 G/50ML
2000 INJECTION, SOLUTION INTRAVENOUS ONCE
Status: COMPLETED | OUTPATIENT
Start: 2022-07-07 | End: 2022-07-07

## 2022-07-07 RX ORDER — VANCOMYCIN HYDROCHLORIDE 1 G/200ML
10 INJECTION, SOLUTION INTRAVENOUS EVERY 24 HOURS
Status: DISCONTINUED | OUTPATIENT
Start: 2022-07-08 | End: 2022-07-08

## 2022-07-07 RX ORDER — TORSEMIDE 20 MG/1
80 TABLET ORAL
Status: DISCONTINUED | OUTPATIENT
Start: 2022-07-07 | End: 2022-07-17 | Stop reason: HOSPADM

## 2022-07-07 RX ORDER — SODIUM CHLORIDE, SODIUM GLUCONATE, SODIUM ACETATE, POTASSIUM CHLORIDE, MAGNESIUM CHLORIDE, SODIUM PHOSPHATE, DIBASIC, AND POTASSIUM PHOSPHATE .53; .5; .37; .037; .03; .012; .00082 G/100ML; G/100ML; G/100ML; G/100ML; G/100ML; G/100ML; G/100ML
125 INJECTION, SOLUTION INTRAVENOUS ONCE
Status: COMPLETED | OUTPATIENT
Start: 2022-07-07 | End: 2022-07-08

## 2022-07-07 RX ORDER — AMLODIPINE BESYLATE 10 MG/1
10 TABLET ORAL DAILY
Status: DISCONTINUED | OUTPATIENT
Start: 2022-07-07 | End: 2022-07-17 | Stop reason: HOSPADM

## 2022-07-07 RX ORDER — ACETAMINOPHEN 325 MG/1
975 TABLET ORAL ONCE
Status: COMPLETED | OUTPATIENT
Start: 2022-07-07 | End: 2022-07-07

## 2022-07-07 RX ORDER — METOPROLOL TARTRATE 50 MG/1
50 TABLET, FILM COATED ORAL EVERY 12 HOURS
Status: DISCONTINUED | OUTPATIENT
Start: 2022-07-07 | End: 2022-07-10

## 2022-07-07 RX ORDER — CEFEPIME HYDROCHLORIDE 1 G/50ML
1000 INJECTION, SOLUTION INTRAVENOUS EVERY 12 HOURS
Status: DISCONTINUED | OUTPATIENT
Start: 2022-07-08 | End: 2022-07-08

## 2022-07-07 RX ORDER — SEVELAMER HYDROCHLORIDE 800 MG/1
800 TABLET, FILM COATED ORAL
Status: DISCONTINUED | OUTPATIENT
Start: 2022-07-07 | End: 2022-07-14

## 2022-07-07 RX ORDER — MIDODRINE HYDROCHLORIDE 5 MG/1
5 TABLET ORAL 3 TIMES WEEKLY
Status: DISCONTINUED | OUTPATIENT
Start: 2022-07-08 | End: 2022-07-17 | Stop reason: HOSPADM

## 2022-07-07 RX ORDER — INSULIN LISPRO 100 [IU]/ML
1-6 INJECTION, SOLUTION INTRAVENOUS; SUBCUTANEOUS
Status: DISCONTINUED | OUTPATIENT
Start: 2022-07-07 | End: 2022-07-17 | Stop reason: HOSPADM

## 2022-07-07 RX ORDER — TAMSULOSIN HYDROCHLORIDE 0.4 MG/1
0.4 CAPSULE ORAL
Status: DISCONTINUED | OUTPATIENT
Start: 2022-07-07 | End: 2022-07-17 | Stop reason: HOSPADM

## 2022-07-07 RX ORDER — ALLOPURINOL 100 MG/1
300 TABLET ORAL EVERY MORNING
Status: DISCONTINUED | OUTPATIENT
Start: 2022-07-08 | End: 2022-07-07

## 2022-07-07 RX ORDER — INSULIN GLARGINE 100 [IU]/ML
10 INJECTION, SOLUTION SUBCUTANEOUS
Status: DISCONTINUED | OUTPATIENT
Start: 2022-07-07 | End: 2022-07-17 | Stop reason: HOSPADM

## 2022-07-07 RX ADMIN — TORSEMIDE 40 MG: 20 TABLET ORAL at 17:15

## 2022-07-07 RX ADMIN — INSULIN LISPRO 2 UNITS: 100 INJECTION, SOLUTION INTRAVENOUS; SUBCUTANEOUS at 19:16

## 2022-07-07 RX ADMIN — ACETAMINOPHEN 975 MG: 325 TABLET ORAL at 13:00

## 2022-07-07 RX ADMIN — INSULIN GLARGINE 10 UNITS: 100 INJECTION, SOLUTION SUBCUTANEOUS at 21:07

## 2022-07-07 RX ADMIN — TAMSULOSIN HYDROCHLORIDE 0.4 MG: 0.4 CAPSULE ORAL at 17:21

## 2022-07-07 RX ADMIN — CEFEPIME HYDROCHLORIDE 2000 MG: 2 INJECTION, SOLUTION INTRAVENOUS at 13:27

## 2022-07-07 RX ADMIN — SODIUM BICARBONATE 650 MG: 650 TABLET ORAL at 19:18

## 2022-07-07 RX ADMIN — METOPROLOL TARTRATE 50 MG: 50 TABLET, FILM COATED ORAL at 17:16

## 2022-07-07 RX ADMIN — AMLODIPINE BESYLATE 10 MG: 10 TABLET ORAL at 21:07

## 2022-07-07 RX ADMIN — SODIUM CHLORIDE, SODIUM GLUCONATE, SODIUM ACETATE, POTASSIUM CHLORIDE, MAGNESIUM CHLORIDE, SODIUM PHOSPHATE, DIBASIC, AND POTASSIUM PHOSPHATE 125 ML/HR: .53; .5; .37; .037; .03; .012; .00082 INJECTION, SOLUTION INTRAVENOUS at 17:11

## 2022-07-07 RX ADMIN — SEVELAMER HYDROCHLORIDE 800 MG: 800 TABLET, FILM COATED PARENTERAL at 17:16

## 2022-07-07 RX ADMIN — SODIUM CHLORIDE 500 ML: 0.9 INJECTION, SOLUTION INTRAVENOUS at 13:15

## 2022-07-07 RX ADMIN — VANCOMYCIN HYDROCHLORIDE 1750 MG: 1 INJECTION, POWDER, LYOPHILIZED, FOR SOLUTION INTRAVENOUS at 15:07

## 2022-07-07 NOTE — ED NOTES
Introduced self to patient - IV initiated and labs collected as charted, meds given as charted and inlcude NSS, ceftriaxone as 1st abx, PO tylenol)  X-rays completed  Wife at bedside, pt reports being in a comfortable position  Pt in front of nurse's station in hallway as no rooms are available  Denies need for further assistance at this time       Elsa Lopes RN  07/07/22 6982

## 2022-07-07 NOTE — ASSESSMENT & PLAN NOTE
· Will monitor fingersticks  · Insulin sliding scale  · Lantus 10 units at bedtime  · Diabetic diet  · Will adjust diabetic regimen as needed

## 2022-07-07 NOTE — ASSESSMENT & PLAN NOTE
· Secondary to diabetic foot infection present on admission with fever and leukocytosis  · X-ray with no obvious bone involvement  · Will continue IV antibiotics with cefepime/vancomycin  · Trend lactate  · Trend procalcitonin  · Follow-up cultures  · Podiatry consultation  · Will continue IV fluids (patient did not receive full sepsis protocol fluid resuscitation as he has a history of end-stage renal disease and is at risk of life-threatening volume overload)

## 2022-07-07 NOTE — ED NOTES
Pt placed on 2 L NC, now at 95% as compared to 88% on RA as charted on vitals flow sheet        Timmy Torres RN  07/07/22 1636

## 2022-07-07 NOTE — ED NOTES
Pt moved to room 21 and onto hospital bed due to status as a hold in ER overnight, wife remains at bedside  Patient changed into fresh gown and sweatpants from home  Call bell within reach, pt reports patient has a few meds he takes daily and asked if we can get them ordered:     Eliquis PO 5mg BID  Amlodipine PO 10mg in evening  Simvastatin 20mg PO in evening  Sodium bicarbonate, unsure of dose  Urinal placed at bedside, denies need for further assistance at this time        Solange Spain RN  07/07/22 0412

## 2022-07-07 NOTE — ASSESSMENT & PLAN NOTE
· On dialysis  · Will consult Nephrology for further management  · 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, sevelamer

## 2022-07-07 NOTE — H&P
Nir 43  H&P- Steve Sagastume 1955, 74 y o  male MRN: [id number]  Unit/Bed#: ED 21 Encounter: [id number]  Primary Care Provider: Lyla Rodriguez DO   Date and time admitted to hospital: 7/7/2022 12:44 PM    * Severe sepsis with lactic acidosis [facility])  Assessment & Plan  · Secondary to diabetic foot infection present on admission with fever and leukocytosis  · X-ray with no obvious bone involvement  · Will continue IV antibiotics with cefepime/vancomycin  · Trend lactate  · Trend procalcitonin  · Follow-up cultures  · Podiatry consultation  · Will continue IV fluids (patient did not receive full sepsis protocol fluid resuscitation as he has a history of end-stage renal disease and is at risk of life-threatening volume overload)    Colon cancer [facility])  Assessment & Plan  · Patient follows with Hematology/Oncology  · Last chemo session was on 07/05/2022    Stage 4 chronic kidney disease ([facility] [address] )  Assessment & Plan  · On dialysis  · Will consult Nephrology for further management  · 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, sevelamer    Type 2 diabetes mellitus with chronic kidney disease, with long-term current use of insulin ([facility])  Assessment & Plan  · Will monitor fingersticks  · Insulin sliding scale  · Lantus 10 units at bedtime  · Diabetic diet  · Will adjust diabetic regimen as needed    Persistent atrial fibrillation ([facility] [address] )  Assessment & Plan  · Will continue Lopressor  · Eliquis held in case patient requires surgical intervention    VTE Prophylaxis: Patient typically on Eliquis, held for now in case patient needs surgical intervention  Code Status:  Full code  Discussion with family:  Spoke with patient's wife at bedside regarding plan of care    Anticipated Length of Stay:  Patient will be admitted on an Inpatient basis with an anticipated length of stay of  > 2 midnights     Justification for Hospital Stay:  Sepsis    Chief Complaint:   Foot infection    History of Present Illness:    Steve Sagastume is a 74 y o  male who presents with foot infection  Patient was sent from his oncologist's office to the ER for further evaluation of erythema on left foot  Patient with history of diabetes and also with history of osteomyelitis requiring amputation of 1st digit on left foot  Patient states that he noticed the redness 24 hours ago which has gradually gotten worse  Also has an open wound on the bottom of his left forefoot  Patient denies any pain complaints  Patient has been having fever/chills over the last 24 hours  Patient denies any nausea or vomiting  No recent trauma    Review of Systems:    Review of Systems   Constitutional: Positive for chills and fever  Skin: Positive for color change and wound  All other systems reviewed and are negative        Past Medical and Surgical History:     Past Medical History:   Diagnosis Date    Anemia     iron def    Arthritis     OA    Bruise of both arms     forearms and both hands    Bruises easily     Cancer ([address] ) 09/01/2021    colon    CHF (congestive heart failure) ([facility])     Chronic kidney disease     CPAP (continuous positive airway pressure) dependence     Diabetes mellitus ([facility] [address] )     Diabetic foot ulcer ([facility] [address] )     Eczema     Erectile dysfunction     Gout     Heart murmur     History of permanent cardiac pacemaker placement 11/2018    History of transfusion     Hyperlipidemia     Hypertension     Hypoglycemia 03/08/2019    Murmur     Nephropathy     Osteoarthritis     Pacemaker 11/06/2018    PAD (peripheral artery disease) ([facility])     Seasonal allergies     Sleep apnea     Toe infection     bilat great toes    Vertigo     Walks frequently     Wears dentures     upper    Wears glasses        Past Surgical History:   Procedure Laterality Date    CARDIAC PACEMAKER PLACEMENT      CARPAL TUNNEL RELEASE Left     CARPAL TUNNEL RELEASE Right     CARPAL TUNNEL RELEASE      COLONOSCOPY  08/2020    COLONOSCOPY      FL GUIDED CENTRAL VENOUS ACCESS DEVICE INSERTION  01/11/2022    FOOT AMPUTATION Left 02/10/2020    Procedure: PARTIAL 1ST RAY AMPUTATION;  Surgeon: César Ortega DPM;  [facility];  Service: Podiatry    INCISION AND DRAINAGE OF WOUND Left 01/12/2020    Procedure: INCISION AND DRAINAGE (I&D) EXTREMITY AND REMOVAL OF SESMOID BONE;  Surgeon: Chari Pollock DPM;  [facility];  Service: Podiatry    IR OTHER  01/21/2022    IR PICC REPOSITION  01/14/2020    IR TUNNELED DIALYSIS CATHETER PLACEMENT  04/26/2022    KNEE ARTHROSCOPY Left     KNEE ARTHROSCOPY Right     KNEE SURGERY      AR AMPUTATION TOE,I-P JT Bilateral 05/02/2017    Procedure: PARTIAL AMPUTATION RIGHT AND LEFT HALLUX ;  Surgeon: César Ortega DPM;  [facility];  Service: Podiatry    AR AMPUTATION TOE,MT-P JT Left 07/25/2017    Procedure: 2ND TOE AMPUTATION;  Surgeon: César Ortega DPM;  [facility];  Service: Podiatry    AR ANASTOMOSIS,AV,ANY SITE Left 6/1/2022    Procedure: CREATION FISTULA ARTERIOVENOUS (AV) RADIOCEPHALIC;  Surgeon: Maicol Yates MD;  [facility];  Service: Vascular    AR INSJ TUNNELED CTR VAD W/SUBQ PORT AGE 5 YR/> N/A 01/11/2022    Procedure: INSERTION VENOUS PORT ( PORT-A-CATH) IR;  Surgeon: Sameera Kenny DO;  [facility];  Service: Interventional Radiology    RESECTION LOW ANTERIOR LAPAROSCOPIC N/A 10/21/2021    Procedure: RESECTION LOW ANTERIOR LAPAROSCOPIC;  Surgeon: Demetria Sutherland MD;  [facility];  Service: Colorectal    SHOULDER ARTHROSCOPY Left     with screws,RTC    SHOULDER SURGERY Left     rotator cuff    TOE AMPUTATION Left 01/08/2020    Procedure: HALLUX AMPUTATION;  Surgeon: Chari Pollock DPM;  [facility];  Service: Podiatry    UPPER GASTROINTESTINAL ENDOSCOPY  03/2020    Intestinal metaplasia prepyloric    VASECTOMY         Meds/Allergies:    Prior to Admission medications    Medication Sig Start Date End Date Taking?  Authorizing Provider allopurinol (ZYLOPRIM) 300 mg tablet Take 1 tablet (300 mg total) by mouth every morning 6/13/22   Elayne Pedersen DO   amLODIPine (NORVASC) 10 mg tablet Take 1 tablet (10 mg total) by mouth daily 9/30/21   PAT Rowe   apixaban (ELIQUIS) 5 mg Take 1 tablet (5 mg total) by mouth every 12 (twelve) hours  Patient taking differently: Take by mouth every 12 (twelve) hours 12/8/21 12/3/22  Katherine Pro MD   b complex-vitamin C-folic acid (NEPHROCAPS) 1 mg capsule Take 1 capsule by mouth in the morning  5/13/22   Uday Boyd MD   Dupilumab (Dupixent) 300 MG/2ML SOPN Inject 300 mg under the skin Once every 2 weeks    Historical Provider, MD   famotidine (PEPCID) 40 MG tablet TAKE 1 TABLET BY MOUTH EVERY DAY 8/4/21   Elayne Pedersen DO   Insulin Degludec-Liraglutide (Xultophy) 100 units-3 6 mg/mL injection pen Inject 19 units daily  5/20/22   PAT Rowe   Insulin Pen Needle (BD Pen Needle Zenaida U/F) 32G X 4 MM MISC Use 1 daily 2/1/22   PAT Rowe   metoprolol tartrate (LOPRESSOR) 50 mg tablet Take 1 tablet (50 mg total) by mouth every 12 (twelve) hours 5/4/21   Tiffany Ramires DO   midodrine (PROAMATINE) 5 mg tablet Take 1 tablet (5 mg total) by mouth 3 (three) times a week Take before dialysis 6/22/22   PAT Alanzi   omega-3-acid ethyl esters (LOVAZA) 1 g capsule Take 2 capsules (2 g total) by mouth 2 (two) times a day Mail print prescription to pt 12/10/21   PAT Rowe   [facility]) 8 mg tablet Take 1 tablet (8 mg total) by mouth every 8 (eight) hours as needed for nausea or vomiting 1/17/22   Franklyn Beyer MD   OneTouch Ultra test strip USE TO TEST BLOOD SUGAR 3 TIMES A DAY 1/5/21   Elayne Pedersen DO   sevelamer carbonate (RENVELA) 800 mg tablet Take 1 tablet (800 mg total) by mouth in the morning and 1 tablet (800 mg total) at noon and 1 tablet (800 mg total) in the evening  Take with meals   5/13/22   Uday Boyd MD   simvastatin (ZOCOR) 20 mg tablet Take 1 tablet (20 mg total) by mouth daily at bedtime 21   Charna Riedel, MD   tamsulosin [facility]) 0 4 mg TAKE 1 CAPSULE BY MOUTH EVERY DAY WITH DINNER 22   DO giovana Angemide [facility]) 20 mg tablet Take 4 tablets (80 mg total) by mouth 4 (four) times a week Take on non-dialysis days only 22   PAT Lantigua   glimepiride (AMARYL) 4 mg tablet TAKE 1 TABLET BY MOUTH TWICE A DAY  Patient taking differently: TAKE 1 TABLET BY MOUTH IN THE AM AND 1/2 TABLET IN PM 10/30/18 5/19/20  Lynne House DO     all medications and allergies reviewed    Allergies: Allergies   Allergen Reactions    Invokana [Canagliflozin] Other (See Comments)     Caused circulation problems    Lamisil [Terbinafine] Blisters     Wife states " His skin peeled from head to toe"    Other Swelling     Pomegranate - facial swelling, no swelling of tongue, esophagus  Adhesive tape        Latex Rash       Social History:     Marital Status: /Civil Union   Occupation:  None  Patient Pre-hospital Living Situation:  Lives with wife  Patient Pre-hospital Level of Mobility:  Ambulatory  Patient Pre-hospital Diet Restrictions:  None  Substance Use History:   Social History     Substance and Sexual Activity   Alcohol Use Never    Alcohol/week: 0 0 standard drinks     Social History     Tobacco Use   Smoking Status Former Smoker    Packs/day: 0 50    Years: 20 00    Pack years: 10 00    Types: Cigarettes    Start date: 1    Quit date:     Years since quittin 5   Smokeless Tobacco Never Used   Tobacco Comment    quit 10 years ago     Social History     Substance and Sexual Activity   Drug Use No       Family History:  I have reviewed the patient's family history    Physical Exam:     Vitals:   Blood Pressure: 103/55 (22 1508)  Pulse: 60 (22 1508)  Temperature: 99 9 °F (37 7 °C) (22 1508)  Temp Source: Tympanic (22 1508)  Respirations: 20 (22 1508)  Height: 5' 10" (177 8 cm) (07/07/22 1237)  Weight - Scale: 89 8 kg (198 lb) (07/07/22 1237)  SpO2: 96 % (07/07/22 1508)    Physical Exam  Constitutional:       General: He is not in acute distress  HENT:      Head: Normocephalic and atraumatic  Nose: Nose normal       Mouth/Throat:      Mouth: Mucous membranes are moist    Eyes:      Extraocular Movements: Extraocular movements intact  Conjunctiva/sclera: Conjunctivae normal    Cardiovascular:      Rate and Rhythm: Normal rate and regular rhythm  Pulmonary:      Effort: Pulmonary effort is normal  No respiratory distress  Abdominal:      Palpations: Abdomen is soft  Tenderness: There is no abdominal tenderness  Musculoskeletal:         General: Normal range of motion  Cervical back: Normal range of motion and neck supple  Skin:     General: Skin is warm and dry  Comments: Erythema of left forefoot with wound noted on left forefoot base  Wound is approximately 3 x 3 mm   Neurological:      General: No focal deficit present  Mental Status: He is alert  Mental status is at baseline  Cranial Nerves: No cranial nerve deficit  Psychiatric:         Mood and Affect: Mood normal          Behavior: Behavior normal          Additional Data:     Lab Results: I have personally reviewed pertinent reports        Results from last 7 days   Lab Units 07/07/22  1300   WBC Thousand/uL 16 54*   HEMOGLOBIN g/dL 9 1*   HEMATOCRIT % 27 5*   PLATELETS Thousands/uL 215   NEUTROS PCT % 89*   LYMPHS PCT % 4*   MONOS PCT % 6   EOS PCT % 0     Results from last 7 days   Lab Units 07/07/22  1300   SODIUM mmol/L 130*   POTASSIUM mmol/L 4 8   CHLORIDE mmol/L 90*   CO2 mmol/L 28   BUN mg/dL 37*   CREATININE mg/dL 3 53*   ANION GAP mmol/L 12   CALCIUM mg/dL 8 9   ALBUMIN g/dL 3 5   TOTAL BILIRUBIN mg/dL 1 10*   ALK PHOS U/L 93   ALT U/L 10   AST U/L 9*   GLUCOSE RANDOM mg/dL 182*     Results from last 7 days   Lab Units 07/07/22  1300   INR  2 15*             Results from last 7 days   Lab Units 07/07/22  1300   LACTIC ACID mmol/L 3 2*   PROCALCITONIN ng/ml 0 92*       Imaging: I have personally reviewed pertinent reports  XR chest 1 view portable   Final Result by Addison Aguilera MD (07/07 1640)      No acute cardiopulmonary disease  Workstation performed: [id number]         XR foot 3+ views LEFT   Final Result by Sadie Wilkinson MD (07/07 1623)      No radiographic evidence for osteomyelitis  Status post amputation of the 1st and 2nd digits as described above  Workstation performed: [id number]           Epic / [facility] Everywhere Records Reviewed: Yes    ** Please Note: This note has been constructed using a voice recognition system   **

## 2022-07-07 NOTE — PROGRESS NOTES
Hematology/Oncology Outpatient Follow-up  Tom Vinson 74 y o  male 1955 [id number]    Date:  7/7/2022        Assessment and Plan:  1  Malignant neoplasm of sigmoid colon [facility])  The patient received his last chemotherapy on 07/05/2022  With weekly 5 FU/leucovorin IV push  Cycle 3 day 29 would be due on 07/12/2022 if the patient recovers from his current acute event  He seems to have an infection most likely bacterial which needs to be further investigated in the emergency room  I did ask the wife to take the patient to immediately to the emergency room for further evaluation and treatment  He will most likely need to be admitted for IV antibiotics if we are dealing with bacterial infection  He will need to be tested for COVID-19 active infection as well  We will then see the patient again after he gets discharged to continue the palliative chemotherapy as an outpatient     - Transfer to other facility        HPI:  The patient can do for a follow-up visit accompanied by his wife  He had toxic looking appearance with diaphoresis and chills  His temperature in the office was 102 3 F  the wife stated that he has been feeling weak for almost a week  His symptoms started to get worse yesterday  His last chemotherapy was on 07/05/2022 with weekly leucovorin and 5 FU  Most recent available blood work on 07/02/2022 showed white cell count of 21  Hemoglobin 10 3 and platelet count of 035  Creatinine 3 1 with normal calcium and liver enzymes  Magnesium 1 9  He is due for hemodialysis tomorrow which is being done 3 times a week  Oncology History Overview Note   Patient with multiple comorbid conditions including CHF, advanced chronic kidney disease, diabetes mellitus, arthritis, sleep apnea, etc   He apparently had multiple colonoscopies 2021  He was found to have adenocarcinoma in the sigmoid region rising from tubular adenoma on 01/08/2021    He had a PET-CT scan on 02/19/2021 which showed uptake in the sigmoid colon otherwise no finding for hypermetabolic metastasis was found  The patient had 2 other colonoscopies and finally had his laparoscopic surgery on 10/21/2021 which showed showed residual adenocarcinoma moderately differentiated arising in the tubular adenoma with high-grade dysplasia, pT3 with 3/16 lymph node involvement  All margins were negative without obvious lymphovascular or perineural invasion  The patient subsequently on 11/24/2021 had a CT scan of the chest abdomen pelvis and IV contrast was given by mistake which show resulted in significant worsening of his kidney function with creatinine around 7  The he required hospitalization  His baseline creatinine level is around 3  The CT scan on 11/24/2021 showed 1 new cm nodule ground-glass density in the superior segment of the right lower lobe which was thought to be inflammatory versus neoplastic  There are 2 areas of heterogeneous attenuation also in the abdominal area of unknown significance  Follow-up in 3 months was suggested  PET/CT 1/4/22: IMPRESSION:  1   1 0 cm groundglass right lower lobe lung nodules seen on CT of chest dated 11/24/2021 is not definitively visualized on the current exam and likely has resolved although evaluation of the lung fields is limited secondary to respiratory motion   artifact  There is no focal FDG activity in the chest characteristic of hypermetabolic malignancy  Given limitations, short interval follow-up with CT of chest in 3 months is recommended to document resolution  2   Multifocal peritoneal hypermetabolic soft tissue foci involving the left mid abdomen and extending along the left paracolic gutter to the central lower abdomen/pelvis, most of which appears to be associated with internal attenuation and is most   suggestive of fat necrosis with underlying peritoneal carcinomatosis not excluded, particularly in the anterior left mid abdomen    As recommended previously, short interval follow-up with imaging in 3 months is recommended to ensure stability and exclude   developing peritoneal carcinomatosis  3   Moderate bilateral pleural effusions  4   Stable in size minimally FDG avid mediastinal/right hilar/left inguinal lymph nodes of uncertain clinical significance but most suggestive of a benign inflammatory process  Attention to these regions on follow-up scans is recommended  5   Air-fluid levels within the bilateral maxillary sinuses for which correlation is recommended to exclude acute sinusitis  He was started on the FOLFOX regimen without the oxaliplatin 1/2022  He was educated he will be educated extensively about the chemotherapy and the potential side effects including renal failure/hemodialysis risk  Patient did not tolerate the 1st cycle of chemotherapy well at all which resulted in hospitalization lesion and need for dialysis x2 treatments  He sought 2nd opinion in network with 1 of our colleagues [facility]  Decision was made to trial 5 FU/Leucovorin at 50% of the usual dose (250mg/m2) via IV push weekly 5 weeks on with 1 week of a break which can be adjusted/titrated slowly according to patient's tolerance        Colon cancer ([facility] [address] )   10/23/2021 Initial Diagnosis    Colon cancer ([facility] [address] )     1/18/2022 - 1/20/2022 Chemotherapy    fluorouracil (ADRUCIL), 400 mg/m2 = 830 mg, Intravenous, Once, 1 of 1 cycle  Administration: 830 mg (1/18/2022)  leucovorin calcium IVPB, 828 mg, Intravenous, Once, 1 of 12 cycles  Administration: 800 mg (1/18/2022)  fluorouracil (ADRUCIL) ambulatory infusion Soln, 1,200 mg/m2/day = 4,970 mg, Intravenous, Over 46 hours, 1 of 12 cycles  Dose modification: 800 mg/m2/day (original dose 1,200 mg/m2/day, Cycle 2, Reason: Other (Must fill in a comment), Comment: Dr young )     3/7/2022 -  Chemotherapy    fluorouracil (ADRUCIL), 515 mg (62 5 % of original dose 400 mg/m2), Intravenous, Once, 3 of 12 cycles  Dose modification: 250 mg/m2 (original dose 400 mg/m2, Cycle 1, Reason: Dose modified as per discussion with consulting physician), 275 mg/m2 (original dose 400 mg/m2, Cycle 2, Reason: Max Dose Reached), 300 mg/m2 (original dose 400 mg/m2, Cycle 3, Reason: Dose modified as per discussion with consulting physician)  Administration: 500 mg (3/7/2022), 500 mg (3/14/2022), 500 mg (3/21/2022), 500 mg (3/28/2022), 500 mg (4/4/2022), 565 mg (4/18/2022), 555 mg (5/3/2022), 555 mg (5/10/2022), 555 mg (5/17/2022), 555 mg (5/24/2022), 605 mg (6/21/2022), 605 mg (6/28/2022), 605 mg (6/14/2022)  leucovorin calcium IVPB, 515 mg (62 5 % of original dose 400 mg/m2), Intravenous, Once, 3 of 12 cycles  Dose modification: 250 mg/m2 (original dose 400 mg/m2, Cycle 1, Reason: Dose modified as per discussion with consulting physician), 275 mg/m2 (original dose 400 mg/m2, Cycle 2, Reason: Max Dose Reached), 300 mg/m2 (original dose 400 mg/m2, Cycle 3, Reason: Dose modified as per discussion with consulting physician)  Administration: 500 mg (3/7/2022), 500 mg (3/14/2022), 500 mg (3/21/2022), 500 mg (3/28/2022), 500 mg (4/4/2022), 550 mg (4/18/2022), 550 mg (5/3/2022), 550 mg (5/10/2022), 550 mg (5/17/2022), 550 mg (5/24/2022), 600 mg (6/21/2022), 600 mg (6/28/2022), 600 mg (6/14/2022)         Interval history:    ROS: Review of Systems   Constitutional: Positive for activity change, chills, diaphoresis and fatigue  Negative for fever  HENT: Negative for ear pain and sore throat  Eyes: Negative for pain and visual disturbance  Respiratory: Positive for cough and shortness of breath  Cardiovascular: Negative for chest pain and palpitations  Gastrointestinal: Positive for constipation  Negative for abdominal pain and vomiting  Genitourinary: Negative for dysuria and hematuria  Musculoskeletal: Positive for arthralgias  Negative for back pain  Skin: Negative for color change and rash  Neurological: Negative for seizures and syncope     Psychiatric/Behavioral: Positive for sleep disturbance  All other systems reviewed and are negative        Past Medical History:   Diagnosis Date    Anemia     iron def    Arthritis     OA    Bruise of both arms     forearms and both hands    Bruises easily     Cancer ([facility] [address] ) 09/01/2021    colon    [facility]congest[facility] failure) [facility])     Chronic kidney disease     CPAP (continuous positive airway pressure) dependence     Diabetes mellitus ([facility] [address] )     Diabetic foot ulcer ([facility] )     Eczema     Erectile dysfunction     Gout     Heart murmur     History of permanent cardiac pacemaker placement 11/2018    History of transfusion     Hyperlipidemia     Hypertension     Hypoglycemia 03/08/2019    Murmur     Nephropathy     Osteoarthritis     Pacemaker 11/06/2018    PAD (peripheral artery disease) ([facility])     Seasonal allergies     Sleep apnea     Toe infection     bilat great toes    Vertigo     Walks frequently     Wears dentures     upper    Wears glasses        Past Surgical History:   Procedure Laterality Date    CARDIAC PACEMAKER PLACEMENT      CARPAL TUNNEL RELEASE Left     CARPAL TUNNEL RELEASE Right     CARPAL TUNNEL RELEASE      COLONOSCOPY  08/2020    COLONOSCOPY      FL GUIDED CENTRAL VENOUS ACCESS DEVICE INSERTION  01/11/2022    FOOT AMPUTATION Left 02/10/2020    Procedure: PARTIAL 1ST RAY AMPUTATION;  Surgeon: Taylor Valentine DPM;  [facility];  Service: Podiatry    INCISION AND DRAINAGE OF WOUND Left 01/12/2020    Procedure: INCISION AND DRAINAGE (I&D) EXTREMITY AND REMOVAL OF SESMOID BONE;  Surgeon: Peri Aranda DPM;  [facility];  Service: Podiatry    IR OTHER  01/21/2022    IR PICC REPOSITION  01/14/2020    IR TUNNELED DIALYSIS CATHETER PLACEMENT  04/26/2022    KNEE ARTHROSCOPY Left     KNEE ARTHROSCOPY Right     KNEE SURGERY      PA AMPUTATION TOE,I-P JT Bilateral 05/02/2017    Procedure: PARTIAL AMPUTATION RIGHT AND LEFT HALLUX ;  Surgeon: Taylor Valentine DPM;  [facility]; Service: Podiatry    PA AMPUTATION TOE,MT-P JT Left 2017    Procedure: 2ND TOE AMPUTATION;  Surgeon: Sherry Devine DPM;  [facility];  Service: Podiatry   [address] [place] [address] Left 2022    Procedure: CREATION FISTULA ARTERIOVENOUS (AV) RADIOCEPHALIC;  Surgeon: Najma Washington MD;  [facility];  Service: Vascular    PA INSJ TUNNELED CTR VAD W/SUBQ PORT AGE 5 YR/> N/A 2022    Procedure: INSERTION VENOUS PORT ( PORT-A-CATH) IR;  Surgeon: Sriram House DO;  [facility];  Service: Interventional Radiology    RESECTION LOW ANTERIOR LAPAROSCOPIC N/A 10/21/2021    Procedure: RESECTION LOW ANTERIOR LAPAROSCOPIC;  Surgeon: Guera Akbar MD;  [facility];  Service: Colorectal    SHOULDER ARTHROSCOPY Left     with screws,RTC    SHOULDER SURGERY Left     rotator cuff    TOE AMPUTATION Left 2020    Procedure: Rosanne Mercury;  Surgeon: Keyona Camacho DPM;  [facility];  Service: Podiatry    UPPER GASTROINTESTINAL ENDOSCOPY  2020    Intestinal metaplasia prepyloric    VASECTOMY         Social History     Socioeconomic History    Marital status: /Civil Union     Spouse name: None    Number of children: None    Years of education: None    Highest education level: None   Occupational History    None   Tobacco Use    Smoking status: Former Smoker     Packs/day: 0 50     Years: 20 00     Pack years: 10 00     Types: Cigarettes     Start date: 1     Quit date:      Years since quittin 5    Smokeless tobacco: Never Used    Tobacco comment: quit 10 years ago   Vaping Use    Vaping Use: Never used   Substance and Sexual Activity    Alcohol use: Never     Alcohol/week: 0 0 standard drinks    Drug use: No    Sexual activity: Not Currently     Partners: Female   Other Topics Concern    None   Social History Narrative    ** Merged History Encounter **         Daily cola consumption (2 cans/day)     Social Determinants of Health     Financial Resource Strain: Not on file   Food Insecurity: No Food Insecurity    Worried About Running Out of Food in the Last Year: Never true    Ran Out of Food in the Last Year: Never true   Transportation Needs: No Transportation Needs    Lack of Transportation (Medical): No    Lack of Transportation (Non-Medical): No   Physical Activity: Not on file   Stress: Not on file   Social Connections: Not on file   Intimate Partner Violence: Not on file   Housing Stability: Low Risk     Unable to Pay for Housing in the Last Year: No    Number of Places Lived in the Last Year: 1    Unstable Housing in the Last Year: No       Family History   Problem Relation Age of Onset    Heart disease Father         passed away    Hypertension Father     Pulmonary embolism Father     Hypertension Mother         assed away       Allergies   Allergen Reactions    Invokana [Canagliflozin] Other (See Comments)     Caused circulation problems    Lamisil [Terbinafine] Blisters     Wife states " His skin peeled from head to toe"    Other Swelling     Pomegranate - facial swelling, no swelling of tongue, esophagus  Adhesive tape        Latex Rash         Current Outpatient Medications:     allopurinol (ZYLOPRIM) 300 mg tablet, Take 1 tablet (300 mg total) by mouth every morning, Disp: 90 tablet, Rfl: 0    amLODIPine (NORVASC) 10 mg tablet, Take 1 tablet (10 mg total) by mouth daily, Disp: 90 tablet, Rfl: 3    apixaban (ELIQUIS) 5 mg, Take 1 tablet (5 mg total) by mouth every 12 (twelve) hours (Patient taking differently: Take by mouth every 12 (twelve) hours), Disp: 180 tablet, Rfl: 3    b complex-vitamin C-folic acid (NEPHROCAPS) 1 mg capsule, Take 1 capsule by mouth in the morning , Disp: 90 capsule, Rfl: 3    Dupilumab (Dupixent) 300 MG/2ML SOPN, Inject 300 mg under the skin Once every 2 weeks, Disp: , Rfl:     famotidine (PEPCID) 40 MG tablet, TAKE 1 TABLET BY MOUTH EVERY DAY, Disp: 90 tablet, Rfl: 3   Insulin Degludec-Liraglutide (Xultophy) 100 units-3 6 mg/mL injection pen, Inject 19 units daily  , Disp: 18 mL, Rfl: 0    Insulin Pen Needle (BD Pen Needle Zenaida U/F) 32G X 4 MM MISC, Use 1 daily, Disp: 100 each, Rfl: 2    metoprolol tartrate (LOPRESSOR) 50 mg tablet, Take 1 tablet (50 mg total) by mouth every 12 (twelve) hours, Disp: 180 tablet, Rfl: 3    midodrine (PROAMATINE) 5 mg tablet, Take 1 tablet (5 mg total) by mouth 3 (three) times a week Take before dialysis, Disp: 30 tablet, Rfl: 2    omega-3-acid ethyl esters (LOVAZA) 1 g capsule, Take 2 capsules (2 g total) by mouth 2 (two) times a day Mail print prescription to pt, Disp: 360 capsule, Rfl: 3    ondansetron (ZOFRAN) 8 mg tablet, Take 1 tablet (8 mg total) by mouth every 8 (eight) hours as needed for nausea or vomiting, Disp: 20 tablet, Rfl: 3    OneTouch Ultra test strip, USE TO TEST BLOOD SUGAR 3 TIMES A DAY, Disp: 100 each, Rfl: 3    sevelamer carbonate (RENVELA) 800 mg tablet, Take 1 tablet (800 mg total) by mouth in the morning and 1 tablet (800 mg total) at noon and 1 tablet (800 mg total) in the evening  Take with meals  , Disp: 270 tablet, Rfl: 3    simvastatin (ZOCOR) 20 mg tablet, Take 1 tablet (20 mg total) by mouth daily at bedtime, Disp: 90 tablet, Rfl: 3    tamsulosin (FLOMAX) 0 4 mg, TAKE 1 CAPSULE BY MOUTH EVERY DAY WITH DINNER, Disp: 90 capsule, Rfl: 3    torsemide (DEMADEX) 20 mg tablet, Take 4 tablets (80 mg total) by mouth 4 (four) times a week Take on non-dialysis days only, Disp: 90 tablet, Rfl: 3  No current facility-administered medications for this visit      Facility-Administered Medications Ordered in Other Visits:     dextrose 5 % infusion, 20 mL/hr, Intravenous, Once, Ever Drop, MD    sodium chloride 0 9 % infusion, 20 mL/hr, Intravenous, Once PRN, Ever Drop, MD      Physical Exam:  /66 (BP Location: Right arm, Patient Position: Sitting, Cuff Size: Adult)   Pulse 68   Temp (!) 102 3 °F (39 1 °C) (Oral) Resp 20   SpO2 97%     Physical Exam  Constitutional:       General: He is in acute distress  Appearance: He is well-developed  He is ill-appearing and toxic-appearing  HENT:      Head: Normocephalic and atraumatic  Nose: Nose normal    Eyes:      General: No scleral icterus  Right eye: No discharge  Left eye: No discharge  Conjunctiva/sclera: Conjunctivae normal       Pupils: Pupils are equal, round, and reactive to light  Neck:      Thyroid: No thyromegaly  Trachea: No tracheal deviation  Cardiovascular:      Rate and Rhythm: Regular rhythm  Tachycardia present  Heart sounds: Murmur heard  No friction rub  Pulmonary:      Effort: Pulmonary effort is normal  No respiratory distress  Breath sounds: Normal breath sounds  No wheezing or rales  Chest:      Chest wall: No tenderness  Abdominal:      General: There is no distension  Palpations: Abdomen is soft  There is no hepatomegaly or splenomegaly  Tenderness: There is no abdominal tenderness  There is no guarding or rebound  Musculoskeletal:         General: No tenderness or deformity  Cervical back: Normal range of motion and neck supple  Comments: Sitting in a wheelchair   Lymphadenopathy:      Cervical: No cervical adenopathy  Skin:     General: Skin is warm and dry  Coloration: Skin is not pale  Findings: No erythema or rash  Neurological:      Mental Status: He is alert and oriented to person, place, and time  Cranial Nerves: No cranial nerve deficit  Coordination: Coordination normal       Deep Tendon Reflexes: Reflexes are normal and symmetric  Psychiatric:         Behavior: Behavior normal          Thought Content:  Thought content normal          Judgment: Judgment normal            Labs:  Lab Results   Component Value Date    WBC 21 98 (H) 07/02/2022    HGB 10 3 (L) 07/02/2022    HCT 31 0 (L) 07/02/2022     (H) 07/02/2022     07/02/2022     Lab Results   Component Value Date     (L) 04/10/2017    K 4 6 07/02/2022     07/02/2022    CO2 28 07/02/2022    BUN 39 (H) 07/02/2022    CREATININE 3 15 (H) 07/02/2022    GLUCOSE 142 (H) 11/01/2018    GLUF 309 (H) 07/02/2022    CALCIUM 9 0 07/02/2022    CORRECTEDCA 9 6 07/02/2022    AST 17 07/02/2022    ALT 25 07/02/2022    ALKPHOS 109 07/02/2022    PROT 6 9 04/10/2017    BILITOT 0 7 04/10/2017    EGFR 19 07/02/2022     No results found for: TSH    Patient voiced understanding and agreement in the above discussion  Aware to contact our office with questions/symptoms in the interim

## 2022-07-07 NOTE — ED NOTES
Patient re-temped as he reported feeling cold  98 6f PO as charted on flow sheet, provided with two warm blankets        Berenice Dominguez RN  07/07/22 5643 Yes

## 2022-07-07 NOTE — SEPSIS NOTE
Sepsis Note   Jennifer Arango 74 y o  male MRN: [id number]  Unit/Bed#: Z1 H5 Encounter: [id number]       qSOFA     [address] Name 07/07/22 1336 07/07/22 1237             Altered mental status GCS < 15 -- --       Respiratory Rate > / =70 0 0       Systolic BP < / =592 0 0       Q Sofa Score 0 0                  Initial Sepsis Screening     Row Name 07/07/22 1401                Is the patient's history suggestive of a new or worsening infection? Yes (Proceed)  -JS        Suspected source of infection soft tissue  -JS        Are two or more of the following signs & symptoms of infection both present and new to the patient? Yes (Proceed)  -JS        Indicate SIRS criteria Hyperthemia > 38 3C (100 9F); Leukocytosis (WBC > 72235 IJL)  -JS        If the answer is yes to both questions, suspicion of sepsis is present --        If severe sepsis is present AND tissue hypoperfusion perists in the hour after fluid resuscitation or lactate > 4, the patient meets criteria for SEPTIC SHOCK --        Are any of the following organ dysfunction criteria present within 6 hours of suspected infection and SIRS criteria that are NOT considered to be chronic conditions? Yes  -JS        Organ dysfunction Lactate > 2 0 mmol/L  -JS        Date of presentation of severe sepsis 07/07/22  -JS        Time of presentation of severe sepsis 1401  -JS        Tissue hypoperfusion persists in the hour after crystalloid fluid administration, evidenced, by either: --        Was hypotension present within one hour of the conclusion of crystalloid fluid administration?  No  -JS        Date of presentation of septic shock --        Time of presentation of septic shock --              User Key  (r) = Recorded By, (t) = Taken By, (c) = Cosigned By    [address] Name Provider Parrish Tello MD Physician

## 2022-07-07 NOTE — CONSULTS
Vancomycin Assessment    Florence Garcia is a 74 y o  male who is currently receiving vancomycin 1750 mg IV X 1 dose then 1000 mg IV Q 24 hrs for skin-soft tissue infection  Relevant clinical data and objective history reviewed:  Creatinine   Date Value Ref Range Status   07/07/2022 3 53 (H) 0 60 - 1 30 mg/dL Final     Comment:     Standardized to IDMS reference method   07/02/2022 3 15 (H) 0 60 - 1 30 mg/dL Final     Comment:     Standardized to IDMS reference method   06/27/2022 3 37 (H) 0 60 - 1 30 mg/dL Final     Comment:     Standardized to IDMS reference method   04/10/2017 1 02 0 70 - 1 25 mg/dL Final     Comment:     Result Comment: For patients >52years of age, the reference limit  for Creatinine is approximately 13% higher for people  identified as -American  /55 (BP Location: Right arm)   Pulse 60   Temp 99 9 °F (37 7 °C) (Tympanic)   Resp 20   Ht 5' 10" (1 778 m)   Wt 89 8 kg (198 lb)   SpO2 96%   BMI 28 41 kg/m²   No intake/output data recorded  Lab Results   Component Value Date/Time    BUN 37 (H) 07/07/2022 01:00 PM    BUN 19 04/10/2017 12:00 AM    WBC 16 54 (H) 07/07/2022 01:00 PM    WBC 14 4 (H) 04/10/2017 12:00 AM    WBC NONE SEEN 04/10/2017 12:00 AM    HGB 9 1 (L) 07/07/2022 01:00 PM    HGB 13 2 04/10/2017 12:00 AM    HCT 27 5 (L) 07/07/2022 01:00 PM    HCT 39 7 04/10/2017 12:00 AM     (H) 07/07/2022 01:00 PM    MCV 91 1 04/10/2017 12:00 AM     07/07/2022 01:00 PM     (H) 04/10/2017 12:00 AM     Temp Readings from Last 3 Encounters:   07/07/22 99 9 °F (37 7 °C) (Tympanic)   07/07/22 (!) 102 3 °F (39 1 °C) (Oral)   07/05/22 97 8 °F (36 6 °C) (Tympanic)     Vancomycin Days of Therapy: 1    Assessment/Plan  The patient is currently on vancomycin utilizing scheduled dosing based on actual body weight  Baseline risks associated with therapy include: pre-existing renal impairment, concomitant nephrotoxic medications, and advanced age    The patient is currently receiving 1750 mg IV X 1 dose then 1000 mg IV Q 24 hrs and is clinically appropriate and dose will be continued  Pharmacy will also follow closely for s/sx of nephrotoxicity, infusion reactions, and appropriateness of therapy  BMP and CBC will be ordered per protocol  Plan for trough as patient approaches steady state, prior to the 4th  dose at approximately 1430 on 7/10/22  Due to infection severity, will target a trough of 15-20 (appropriate for most indications)   Pharmacy will continue to follow the patients culture results and clinical progress daily      Santos Hyman, Pharmacist

## 2022-07-07 NOTE — ED PROVIDER NOTES
History  Chief Complaint   Patient presents with    Fever - 9 weeks to 76 years     Patient is a 78-year-old male with history of colon cancer actively receiving chemotherapy, diabetes mellitus that presents for evaluation of fever  Patient is slightly altered because of his fever and history is primarily provided by the patient's wife at the bedside  Patient has been dealing with fever over the past 2-3 days  He has slept very poorly at night and has been slightly confused  He has also been developing worsening erythema, redness, swelling of the left foot  Patient has history of some chronic wounds on his 2nd and 3rd toes but these have also been worsening  He has been spiking fevers T-max 103°  He has not taken anything for the fever  He otherwise denies cough rhinorrhea, congestion, chest pain, dyspnea abdominal pain  No diarrhea or vomiting noted  Patient was sent in by his heme onc doctor  Prior to Admission Medications   Prescriptions Last Dose Informant Patient Reported? Taking? Dupilumab (Dupixent) 300 MG/2ML SOPN  Self Yes No   Sig: Inject 300 mg under the skin Once every 2 weeks   Insulin Degludec-Liraglutide (Xultophy) 100 units-3 6 mg/mL injection pen  Self No No   Sig: Inject 19 units daily  Insulin Pen Needle (BD Pen Needle Zenaida U/F) 32G X 4 MM MISC  Self No No   Sig: Use 1 daily   OneTouch Ultra test strip  Self No No   Sig: USE TO TEST BLOOD SUGAR 3 TIMES A DAY   allopurinol (ZYLOPRIM) 300 mg tablet  Self No No   Sig: Take 1 tablet (300 mg total) by mouth every morning   amLODIPine (NORVASC) 10 mg tablet  Self No No   Sig: Take 1 tablet (10 mg total) by mouth daily   apixaban (ELIQUIS) 5 mg   No No   Sig: Take 1 tablet (5 mg total) by mouth every 12 (twelve) hours   Patient taking differently: Take by mouth every 12 (twelve) hours   b complex-vitamin C-folic acid (NEPHROCAPS) 1 mg capsule  Self No No   Sig: Take 1 capsule by mouth in the morning     famotidine (PEPCID) 40 MG tablet  Self No No   Sig: TAKE 1 TABLET BY MOUTH EVERY DAY   metoprolol tartrate (LOPRESSOR) 50 mg tablet  Self No No   Sig: Take 1 tablet (50 mg total) by mouth every 12 (twelve) hours   midodrine (PROAMATINE) 5 mg tablet   No No   Sig: Take 1 tablet (5 mg total) by mouth 3 (three) times a week Take before dialysis   omega-3-acid ethyl esters (LOVAZA) 1 g capsule  Self No No   Sig: Take 2 capsules (2 g total) by mouth 2 (two) times a day Mail print prescription to pt   ondansetron (ZOFRAN) 8 mg tablet   No No   Sig: Take 1 tablet (8 mg total) by mouth every 8 (eight) hours as needed for nausea or vomiting   sevelamer carbonate (RENVELA) 800 mg tablet  Self No No   Sig: Take 1 tablet (800 mg total) by mouth in the morning and 1 tablet (800 mg total) at noon and 1 tablet (800 mg total) in the evening  Take with meals     simvastatin (ZOCOR) 20 mg tablet  Self No No   Sig: Take 1 tablet (20 mg total) by mouth daily at bedtime   tamsulosin (FLOMAX) 0 4 mg  Self No No   Sig: TAKE 1 CAPSULE BY MOUTH EVERY DAY WITH DINNER   torsemide (DEMADEX) 20 mg tablet   No No   Sig: Take 4 tablets (80 mg total) by mouth 4 (four) times a week Take on non-dialysis days only      Facility-Administered Medications: None       Past Medical History:   Diagnosis Date    Anemia     iron def    Arthritis     OA    Bruise of both arms     forearms and both hands    Bruises easily     Cancer [facility] 09/01/2021    colon    CHF (congestive heart failure) (HCC)     Chronic kidney disease     CPAP (continuous positive airway pressure) dependence     Diabetes mellitus ([facility]     Diabetic foot ulcer ([facility]     Eczema     Erectile dysfunction     Gout     Heart murmur     History of permanent cardiac pacemaker placement 11/2018    History of transfusion     Hyperlipidemia     Hypertension     Hypoglycemia 03/08/2019    Murmur     Nephropathy     Osteoarthritis     Pacemaker 11/06/2018    PAD (peripheral artery disease) ([facility]     Seasonal allergies     Sleep apnea     Toe infection     bilat great toes    Vertigo     Walks frequently     Wears dentures     upper    Wears glasses        Past Surgical History:   Procedure Laterality Date    CARDIAC PACEMAKER PLACEMENT      CARPAL TUNNEL RELEASE Left     CARPAL TUNNEL RELEASE Right     CARPAL TUNNEL RELEASE      COLONOSCOPY  08/2020    COLONOSCOPY      FL GUIDED CENTRAL VENOUS ACCESS DEVICE INSERTION  01/11/2022    FOOT AMPUTATION Left 02/10/2020    Procedure: PARTIAL 1ST RAY AMPUTATION;  Surgeon: Richard Baeza DPM;  [facility];  Service: Podiatry    INCISION AND DRAINAGE OF WOUND Left 01/12/2020    Procedure: INCISION AND DRAINAGE (I&D) EXTREMITY AND REMOVAL OF SESMOID BONE;  Surgeon: oNe Nagy DPM;  [facility];  Service: Podiatry    IR OTHER  01/21/2022    IR PICC REPOSITION  01/14/2020    IR TUNNELED DIALYSIS CATHETER PLACEMENT  04/26/2022    KNEE ARTHROSCOPY Left     KNEE ARTHROSCOPY Right     KNEE SURGERY      ME AMPUTATION TOE,I-P JT Bilateral 05/02/2017    Procedure: PARTIAL AMPUTATION RIGHT AND LEFT HALLUX ;  Surgeon: Richard Baeza DPM;  [facility];  Service: Podiatry    ME AMPUTATION TOE,MT-P JT Left 07/25/2017    Procedure: 2ND TOE AMPUTATION;  Surgeon: Richard Baeza DPM;  [facility];  Service: Podiatry    ME ANASTOMOSIS,AV,ANY SITE Left 6/1/2022    Procedure: CREATION FISTULA ARTERIOVENOUS (AV) RADIOCEPHALIC;  Surgeon: Arnoldo Blue MD;  [facility];  Service: Vascular    ME INSJ TUNNELED CTR VAD W/SUBQ PORT AGE 5 YR/> N/A 01/11/2022    Procedure: INSERTION VENOUS PORT ( PORT-A-CATH) IR;  Surgeon: Kaylee Cordova DO;  [facility];  Service: Interventional Radiology    RESECTION LOW ANTERIOR LAPAROSCOPIC N/A 10/21/2021    Procedure: RESECTION LOW ANTERIOR LAPAROSCOPIC;  Surgeon: Reuben Fowler MD;  [facility];  Service: Colorectal    SHOULDER ARTHROSCOPY Left     with queta,RTC   Herman Abt SHOULDER SURGERY Left     rotator cuff    TOE AMPUTATION Left 2020    Procedure: Burton Lacy;  Surgeon: Gearld Gilford, DPM;  [facility];  Service: Podiatry    UPPER GASTROINTESTINAL ENDOSCOPY  2020    Intestinal metaplasia prepyloric    VASECTOMY         Family History   Problem Relation Age of Onset    Heart disease Father         passed away    Hypertension Father     Pulmonary embolism Father     Hypertension Mother         assed away     I have reviewed and agree with the history as documented  E-Cigarette/Vaping    E-Cigarette Use Never User      E-Cigarette/Vaping Substances    Nicotine No     THC No     CBD No     Flavoring No     Other No     Unknown No      Social History     Tobacco Use    Smoking status: Former Smoker     Packs/day: 0 50     Years: 20 00     Pack years: 10 00     Types: Cigarettes     Start date:      Quit date:      Years since quittin 5    Smokeless tobacco: Never Used    Tobacco comment: quit 10 years ago   Vaping Use    Vaping Use: Never used   Substance Use Topics    Alcohol use: Never     Alcohol/week: 0 0 standard drinks    Drug use: No       Review of Systems   Constitutional: Positive for fever  HENT: Negative for sore throat  Eyes: Negative for photophobia  Respiratory: Negative for shortness of breath  Cardiovascular: Negative for chest pain  Gastrointestinal: Negative for abdominal pain  Genitourinary: Negative for dysuria  Musculoskeletal: Negative for back pain  Left lower extremity redness, swelling, pain   Skin: Negative for rash  Neurological: Negative for light-headedness  Hematological: Negative for adenopathy  Psychiatric/Behavioral: Negative for agitation  All other systems reviewed and are negative  Physical Exam  Physical Exam  Vitals reviewed  Constitutional:       General: He is not in acute distress  Appearance: He is well-developed     HENT:      Head: Normocephalic  Eyes:      Pupils: Pupils are equal, round, and reactive to light  Cardiovascular:      Rate and Rhythm: Normal rate and regular rhythm  Heart sounds: Normal heart sounds  No murmur heard  No friction rub  No gallop  Pulmonary:      Effort: Pulmonary effort is normal       Breath sounds: Normal breath sounds  Abdominal:      General: Bowel sounds are normal  There is no distension  Palpations: Abdomen is soft  Tenderness: There is no abdominal tenderness  There is no guarding  Musculoskeletal:         General: Normal range of motion  Cervical back: Normal range of motion and neck supple  Comments: Patient with ulceration of the 2nd and 3rd toe, previous indication of the 1st toe  Patient with erythema, swelling, warmth consistent with cellulitis  Skin:     Capillary Refill: Capillary refill takes less than 2 seconds  Neurological:      Mental Status: He is alert  Cranial Nerves: No cranial nerve deficit  Sensory: No sensory deficit  Motor: No abnormal muscle tone  Psychiatric:         Behavior: Behavior normal          Thought Content:  Thought content normal          Judgment: Judgment normal          Vital Signs  ED Triage Vitals [07/07/22 1237]   Temperature Pulse Respirations Blood Pressure SpO2   (!) 103 3 °F (39 6 °C) 62 18 129/89 91 %      Temp Source Heart Rate Source Patient Position - Orthostatic VS BP Location FiO2 (%)   Oral Monitor Sitting Right arm --      Pain Score       No Pain           Vitals:    07/07/22 1237 07/07/22 1336 07/07/22 1508 07/07/22 1857   BP: 129/89 142/67 103/55 125/60   Pulse: 62 92 60 60   Patient Position - Orthostatic VS: Sitting Sitting Sitting Lying         Visual Acuity      ED Medications  Medications   amLODIPine (NORVASC) tablet 10 mg (has no administration in time range)   metoprolol tartrate (LOPRESSOR) tablet 50 mg (50 mg Oral Given 7/7/22 1716)   midodrine (PROAMATINE) tablet 5 mg (has no administration in time range)   sevelamer (RENAGEL) tablet 800 mg (800 mg Oral Given 7/7/22 1716)   tamsulosin (FLOMAX) capsule 0 4 mg (0 4 mg Oral Given 7/7/22 1721)   torsemide (DEMADEX) tablet 80 mg (40 mg Oral Given 7/7/22 1715)   vancomycin (VANCOCIN) IVPB (premix in dextrose) 1,000 mg 200 mL (has no administration in time range)   cefepime (MAXIPIME) IVPB (premix in dextrose) 1,000 mg 50 mL (has no administration in time range)   insulin lispro (HumaLOG) 100 units/mL subcutaneous injection 1-6 Units (2 Units Subcutaneous Given 7/7/22 1916)   insulin glargine (LANTUS) subcutaneous injection 10 Units 0 1 mL (has no administration in time range)   sodium bicarbonate tablet 650 mg (650 mg Oral Given 7/7/22 1918)   sodium chloride 0 9 % bolus 500 mL (0 mL Intravenous Stopped 7/7/22 1430)   cefepime (MAXIPIME) IVPB (premix in dextrose) 2,000 mg 50 mL (0 mg Intravenous Stopped 7/7/22 1644)   acetaminophen (TYLENOL) tablet 975 mg (975 mg Oral Given 7/7/22 1300)   vancomycin (VANCOCIN) 1,750 mg in sodium chloride 0 9 % 500 mL IVPB (0 mg/kg × 89 8 kg Intravenous Stopped 7/7/22 1715)   multi-electrolyte (PLASMALYTE-A/ISOLYTE-S PH 7 4) IV solution (125 mL/hr Intravenous New Bag 7/7/22 1711)       Diagnostic Studies  Results Reviewed     Procedure Component Value Units Date/Time    Fingerstick Glucose (POCT) [[id number]]  (Abnormal) Collected: 07/07/22 1852    Lab Status: Final result Updated: 07/07/22 1900     POC Glucose 232 mg/dl     Lactic acid 2 Hours [[id number]]  (Normal) Collected: 07/07/22 1646    Lab Status: Final result Specimen: Blood from Arm, Right Updated: 07/07/22 1710     LACTIC ACID 1 9 mmol/L     Narrative:      Result may be elevated if tourniquet was used during collection      FLU/RSV/COVID - if FLU/RSV clinically relevant [[id number]]  (Normal) Collected: 07/07/22 1300    Lab Status: Final result Specimen: Nares from Nasopharyngeal Swab Updated: 07/07/22 1356     SARS-CoV-2 Negative     INFLUENZA A PCR Negative     INFLUENZA B PCR Negative     RSV PCR Negative    Narrative:      FOR PEDIATRIC PATIENTS - copy/paste COVID Guidelines URL to browser: [email or web address]    SARS-CoV-2 assay is a Nucleic Acid Amplification assay intended for the  qualitative detection of nucleic acid from SARS-CoV-2 in nasopharyngeal  swabs  Results are for the presumptive identification of SARS-CoV-2 RNA  Positive results are indicative of infection with SARS-CoV-2, the virus  causing COVID-19, but do not rule out bacterial infection or co-infection  with other viruses  Laboratories within the [place] and its  territories are required to report all positive results to the appropriate  public health authorities  Negative results do not preclude SARS-CoV-2  infection and should not be used as the sole basis for treatment or other  patient management decisions  Negative results must be combined with  clinical observations, patient history, and epidemiological information  This test has not been FDA cleared or approved  This test has been authorized by FDA under an Emergency Use Authorization  (EUA)  This test is only authorized for the duration of time the  declaration that circumstances exist justifying the authorization of the  emergency use of an in vitro diagnostic tests for detection of SARS-CoV-2  virus and/or diagnosis of COVID-19 infection under section 564(b)(1) of  the Act, 21 U  S C  825KAY-4(R)(4), unless the authorization is terminated  or revoked sooner  The test has been validated but independent review by FDA  and CLIA is pending  Test performed using [employer] GeneXpert: This RT-PCR assay targets N2,  a region unique to SARS-CoV-2  A conserved region in the E-gene was chosen  for pan-Sarbecovirus detection which includes SARS-CoV-2      Lactic acid [[id number]]  (Abnormal) Collected: 07/07/22 1300    Lab Status: Final result Specimen: Blood from Arm, Right Updated: 07/07/22 1342     LACTIC ACID 3 2 mmol/L     Narrative:      Result may be elevated if tourniquet was used during collection  Procalcitonin [[id number]]  (Abnormal) Collected: 07/07/22 1300    Lab Status: Final result Specimen: Blood from Arm, Right Updated: 07/07/22 1338     Procalcitonin 0 92 ng/ml     Comprehensive metabolic panel [[id number]]  (Abnormal) Collected: 07/07/22 1300    Lab Status: Final result Specimen: Blood from Arm, Right Updated: 07/07/22 1329     Sodium 130 mmol/L      Potassium 4 8 mmol/L      Chloride 90 mmol/L      CO2 28 mmol/L      ANION GAP 12 mmol/L      BUN 37 mg/dL      Creatinine 3 53 mg/dL      Glucose 182 mg/dL      Calcium 8 9 mg/dL      AST 9 U/L      ALT 10 U/L      Alkaline Phosphatase 93 U/L      Total Protein 6 8 g/dL      Albumin 3 5 g/dL      Total Bilirubin 1 10 mg/dL      eGFR 16 ml/min/1 73sq m     Narrative:      [place] guidelines for Chronic Kidney Disease (CKD):     Stage 1 with normal or high GFR (GFR > 90 mL/min/1 73 square meters)    Stage 2 Mild CKD (GFR = 60-89 mL/min/1 73 square meters)    Stage 3A Moderate CKD (GFR = 45-59 mL/min/1 73 square meters)    Stage 3B Moderate CKD (GFR = 30-44 mL/min/1 73 square meters)    Stage 4 Severe CKD (GFR = 15-29 mL/min/1 73 square meters)    Stage 5 End Stage CKD (GFR <15 mL/min/1 73 square meters)  Note: GFR calculation is accurate only with a steady state creatinine    Protime-INR [[id number]]  (Abnormal) Collected: 07/07/22 1300    Lab Status: Final result Specimen: Blood from Arm, Right Updated: 07/07/22 1327     Protime 23 5 seconds      INR 2 15    APTT [[id number]]  (Abnormal) Collected: 07/07/22 1300    Lab Status: Final result Specimen: Blood from Arm, Right Updated: 07/07/22 1327     PTT 38 seconds     Blood culture #1 [[id number]] Collected: 07/07/22 1311    Lab Status:  In process Specimen: Blood from Arm, Right Updated: 07/07/22 1313    CBC and differential [[id number]] (Abnormal) Collected: 07/07/22 1300    Lab Status: Final result Specimen: Blood from Arm, Right Updated: 07/07/22 1311     WBC 16 54 Thousand/uL      RBC 2 70 Million/uL      Hemoglobin 9 1 g/dL      Hematocrit 27 5 %       fL      MCH 33 7 pg      MCHC 33 1 g/dL      RDW 17 1 %      MPV 9 4 fL      Platelets 549 Thousands/uL      nRBC 0 /100 WBCs      Neutrophils Relative 89 %      Immat GRANS % 1 %      Lymphocytes Relative 4 %      Monocytes Relative 6 %      Eosinophils Relative 0 %      Basophils Relative 0 %      Neutrophils Absolute 14 74 Thousands/µL      Immature Grans Absolute 0 11 Thousand/uL      Lymphocytes Absolute 0 62 Thousands/µL      Monocytes Absolute 1 06 Thousand/µL      Eosinophils Absolute 0 00 Thousand/µL      Basophils Absolute 0 01 Thousands/µL     Blood culture #2 [[id number]] Collected: 07/07/22 1300    Lab Status: In process Specimen: Blood from Arm, Right Updated: 07/07/22 1308    UA w Reflex to Microscopic w Reflex to Culture [[id number]]     Lab Status: No result Specimen: Urine                  XR chest 1 view portable   Final Result by Rosina Kelly MD (07/07 1640)      No acute cardiopulmonary disease  Workstation performed: [id number]         XR foot 3+ views LEFT   Final Result by Yusra Dimas MD (07/07 1623)      No radiographic evidence for osteomyelitis  Status post amputation of the 1st and 2nd digits as described above              Workstation performed: [id number]                    Procedures  ECG 12 Lead Documentation Only    Date/Time: 7/7/2022 7:22 PM  Performed by: Jessi Soriano MD  Authorized by: Jessi Soriano MD     ECG reviewed by me, the ED Provider: yes    Patient location:  ED  Previous ECG:     Previous ECG:  Compared to current    Similarity:  No change    Comparison to cardiac monitor: Yes    Interpretation:     Interpretation: non-specific    Rate:     ECG rate assessment: normal    Rhythm:     Rhythm: paced    Pacing: Capture:  Complete    Type of pacing:  Ventricular  Ectopy:     Ectopy: none    QRS:     QRS axis:  Left    QRS intervals: Wide  Conduction:     Conduction: normal    ST segments:     ST segments:  Normal  T waves:     T waves: normal               ED Course  ED Course as of 07/07/22 1923   Thu Jul 07, 2022   1344 LACTIC ACID(!!): 3 2  Will hold on aggressive fluid resuscitation secondary to the patient's end-stage renal disease/dialysis status  Believe lactate may also be falsely elevated secondary to end-stage renal disease                            Initial Sepsis Screening     Row Name 07/07/22 1401                Is the patient's history suggestive of a new or worsening infection? Yes (Proceed)  -JS        Suspected source of infection soft tissue  -JS        Are two or more of the following signs & symptoms of infection both present and new to the patient? Yes (Proceed)  -JS        Indicate SIRS criteria Hyperthemia > 38 3C (100 9F); Leukocytosis (WBC > 06700 IJL)  -JS        If the answer is yes to both questions, suspicion of sepsis is present --        If severe sepsis is present AND tissue hypoperfusion perists in the hour after fluid resuscitation or lactate > 4, the patient meets criteria for SEPTIC SHOCK --        Are any of the following organ dysfunction criteria present within 6 hours of suspected infection and SIRS criteria that are NOT considered to be chronic conditions? Yes  -JS        Organ dysfunction Lactate > 2 0 mmol/L  -JS        Date of presentation of severe sepsis 07/07/22  -JS        Time of presentation of severe sepsis 1401  -JS        Tissue hypoperfusion persists in the hour after crystalloid fluid administration, evidenced, by either: --        Was hypotension present within one hour of the conclusion of crystalloid fluid administration?  No  -JS        Date of presentation of septic shock --        Time of presentation of septic shock --              User Key  (r) = Recorded By, (t) = Taken By, (c) = Cosigned By    [address] Name Provider Paula Dumont MD Physician              Default Flowsheet Data (last 720 hours)     Sepsis Reassess     Row Name 07/07/22 1916                   Repeat Volume Status and Tissue Perfusion Assessment Performed    Repeat Volume Status and Tissue Perfusion Assessment Performed Yes  -JS                  Volume Status and Tissue Perfusion Post Fluid Resuscitation * Must Document All *    Vital Signs Reviewed (HR, RR, BP, T) Yes  -JS        Shock Index Reviewed Yes  -JS        Arterial Oxygen Saturation Reviewed (POx, SaO2 or SpO2) --  96  -JS        Cardio Normal S1/S2; Regular rate and rhythm  -JS        Pulmonary Normal effort;Clear to auscultation  -JS        Capillary Refill Brisk  -JS        Peripheral Pulses Radial  -JS        Peripheral Pulse +2  -JS        Skin Warm;Dry  -JS        Urine output assessed Adequate  -JS                  *OR*   Intensive Monitoring- Must Document One of the Following Four *:    Vital Signs Reviewed --        * Central Venous Pressure (CVP or RAP) --        * Central Venous Oxygen (SVO2, ScvO2 or Oxygen saturation via central catheter) --        * Bedside Cardiovascular US in IVC diameter and % collapse --        * Passive Leg Raise OR Crystalloid Challenge --              User Key  (r) = Recorded By, (t) = Taken By, (c) = Cosigned By    Initials Name Provider Type    Mahsa Rice MD Physician              SBIRT 20yo+    Flowsheet Row Most Recent Value   SBIRT (23 yo +)    In order to provide better care to our patients, we are screening all of our patients for alcohol and drug use  Would it be okay to ask you these screening questions? Yes Filed at: 07/07/2022 1334   Initial Alcohol Screen: US AUDIT-C     1  How often do you have a drink containing alcohol? 0 Filed at: 07/07/2022 1333   2  How many drinks containing alcohol do you have on a typical day you are drinking?   0 Filed at: 07/07/2022 2373 3b  FEMALE Any Age, or MALE 65+: How often do you have 4 or more drinks on one occassion? 0 Filed at: 07/07/2022 0658   Audit-C Score 0 Filed at: 07/07/2022 1338   JO: How many times in the past year have you    Used an illegal drug or used a prescription medication for non-medical reasons? Never Filed at: 07/07/2022 1338                    MDM  Number of Diagnoses or Management Options  Cellulitis of left foot  Severe sepsis [facility])  Diagnosis management comments: Patient is a 64-year-old male who presents for evaluation of fever  Left lower extremity consistent with cellulitis  Broad-spectrum antibiotics initiated and will be admitted the hospital for further workup and management  Disposition  Final diagnoses:   Severe sepsis ([address] )   Cellulitis of left foot     Time reflects when diagnosis was documented in both MDM as applicable and the Disposition within this note     Time User Action Codes Description Comment    7/7/2022  2:15 PM Marisel Rios Add [A41 9,  R65 20] Severe sepsis ([address] )     7/7/2022  2:15 PM Yesy Hudson Add [L03 116] Cellulitis of left foot     7/7/2022  4:39 PM Camila Dotson Add [N18 4] Stage 4 chronic kidney disease [facility])       ED Disposition     ED Disposition   Admit    Condition   Stable    Date/Time   Thu Jul 7, 2022  2:15 PM    Comment   Case was discussed with [facility] and the patient's admission status was agreed to be Admission Status: inpatient status to the service of Dr Michelle Cardoso   Follow-up Information    None         Patient's Medications   Discharge Prescriptions    No medications on file       No discharge procedures on file      PDMP Review       Value Time User    PDMP Reviewed  Yes 1/31/2020  1:53 PM Tyler Salomon DO          ED Provider  Electronically Signed by           Pat Seth MD  07/07/22 9118

## 2022-07-07 NOTE — SEPSIS NOTE
Sepsis Note   Romi Juarez 74 y o  male MRN: [id number]  Unit/Bed#: ED 21 Encounter: [id number]       qSOFA     [address] Name 07/07/22 1857 07/07/22 1508 07/07/22 1336 07/07/22 1237       Altered mental status GCS < 15 -- -- -- --     Respiratory Rate > / =22 0 0 0 0     Systolic BP < / =510 0 0 0 0     Q Sofa Score 0 0 0 0                Initial Sepsis Screening     Row Name 07/07/22 1401                Is the patient's history suggestive of a new or worsening infection? Yes (Proceed)  -JS        Suspected source of infection soft tissue  -JS        Are two or more of the following signs & symptoms of infection both present and new to the patient? Yes (Proceed)  -JS        Indicate SIRS criteria Hyperthemia > 38 3C (100 9F); Leukocytosis (WBC > 29528 IJL)  -JS        If the answer is yes to both questions, suspicion of sepsis is present --        If severe sepsis is present AND tissue hypoperfusion perists in the hour after fluid resuscitation or lactate > 4, the patient meets criteria for SEPTIC SHOCK --        Are any of the following organ dysfunction criteria present within 6 hours of suspected infection and SIRS criteria that are NOT considered to be chronic conditions? Yes  -JS        Organ dysfunction Lactate > 2 0 mmol/L  -JS        Date of presentation of severe sepsis 07/07/22  -JS        Time of presentation of severe sepsis 1401  -JS        Tissue hypoperfusion persists in the hour after crystalloid fluid administration, evidenced, by either: --        Was hypotension present within one hour of the conclusion of crystalloid fluid administration?  No  -JS        Date of presentation of septic shock --        Time of presentation of septic shock --              User Key  (r) = Recorded By, (t) = Taken By, (c) = Cosigned By    [address] Name Provider Type    Jacque Oh MD Physician                  Default Flowsheet Data (last 720 hours)     Sepsis Reassess     Row Name 07/07/22 8052 Repeat Volume Status and Tissue Perfusion Assessment Performed    Repeat Volume Status and Tissue Perfusion Assessment Performed Yes  -JS                  Volume Status and Tissue Perfusion Post Fluid Resuscitation * Must Document All *    Vital Signs Reviewed (HR, RR, BP, T) Yes  -JS        Shock Index Reviewed Yes  -JS        Arterial Oxygen Saturation Reviewed (POx, SaO2 or SpO2) --  96  -JS        Cardio Normal S1/S2; Regular rate and rhythm  -JS        Pulmonary Normal effort;Clear to auscultation  -JS        Capillary Refill Brisk  -JS        Peripheral Pulses Radial  -JS        Peripheral Pulse +2  -JS        Skin Warm;Dry  -JS        Urine output assessed Adequate  -JS                  *OR*   Intensive Monitoring- Must Document One of the Following Four *:    Vital Signs Reviewed --        * Central Venous Pressure (CVP or RAP) --        * Central Venous Oxygen (SVO2, ScvO2 or Oxygen saturation via central catheter) --        * Bedside Cardiovascular US in IVC diameter and % collapse --        * Passive Leg Raise OR Crystalloid Challenge --              User Key  (r) = Recorded By, (t) = Taken By, (c) = Cosigned By    Initials Name Provider Type    Kamala Tierney MD Physician

## 2022-07-08 ENCOUNTER — APPOINTMENT (INPATIENT)
Dept: DIALYSIS | Facility: HOSPITAL | Age: 67
DRG: 853 | End: 2022-07-08
Payer: MEDICARE

## 2022-07-08 ENCOUNTER — APPOINTMENT (INPATIENT)
Dept: RADIOLOGY | Facility: HOSPITAL | Age: 67
DRG: 853 | End: 2022-07-08
Payer: MEDICARE

## 2022-07-08 ENCOUNTER — APPOINTMENT (INPATIENT)
Dept: NON INVASIVE DIAGNOSTICS | Facility: HOSPITAL | Age: 67
DRG: 853 | End: 2022-07-08
Payer: MEDICARE

## 2022-07-08 PROBLEM — L08.9 DIABETIC INFECTION OF LEFT FOOT (HCC): Status: ACTIVE | Noted: 2022-07-08

## 2022-07-08 PROBLEM — E11.628 DIABETIC INFECTION OF LEFT FOOT (HCC): Status: ACTIVE | Noted: 2022-01-01

## 2022-07-08 LAB
ALBUMIN SERPL BCP-MCNC: 3 G/DL (ref 3.5–5)
ANION GAP SERPL CALCULATED.3IONS-SCNC: 9 MMOL/L (ref 4–13)
ANISOCYTOSIS BLD QL SMEAR: PRESENT
AORTIC ROOT: 3.5 CM
AORTIC VALVE MEAN VELOCITY: 21.1 M/S
ASCENDING AORTA: 3.6 CM
ATRIAL RATE: 248 BPM
AV AREA BY CONTINUOUS VTI: 1.6 CM2
AV AREA PEAK VELOCITY: 1.7 CM2
AV LVOT MEAN GRADIENT: 4 MMHG
AV LVOT PEAK GRADIENT: 8 MMHG
AV MEAN GRADIENT: 19 MMHG
AV PEAK GRADIENT: 33 MMHG
AV REGURGITATION PRESSURE HALF TIME: 469 MS
AV VALVE AREA: 1.63 CM2
AV VELOCITY RATIO: 0.51
BASOPHILS # BLD MANUAL: 0 THOUSAND/UL (ref 0–0.1)
BASOPHILS NFR MAR MANUAL: 0 % (ref 0–1)
BUN SERPL-MCNC: 45 MG/DL (ref 5–25)
CALCIUM SERPL-MCNC: 8.4 MG/DL (ref 8.4–10.2)
CHLORIDE SERPL-SCNC: 95 MMOL/L (ref 96–108)
CO2 SERPL-SCNC: 25 MMOL/L (ref 21–32)
CREAT SERPL-MCNC: 4.17 MG/DL (ref 0.6–1.3)
DOP CALC AO PEAK VEL: 2.85 M/S
DOP CALC AO VTI: 63.3 CM
DOP CALC LVOT AREA: 3.46 CM2
DOP CALC LVOT DIAMETER: 2.1 CM
DOP CALC LVOT PEAK VEL VTI: 29.75 CM
DOP CALC LVOT PEAK VEL: 1.44 M/S
DOP CALC LVOT STROKE INDEX: 50.5 ML/M2
DOP CALC LVOT STROKE VOLUME: 102.99 CM3
DOP CALC MV VTI: 65.49 CM
E WAVE DECELERATION TIME: 363 MS
EOSINOPHIL # BLD MANUAL: 0 THOUSAND/UL (ref 0–0.4)
EOSINOPHIL NFR BLD MANUAL: 0 % (ref 0–6)
ERYTHROCYTE [DISTWIDTH] IN BLOOD BY AUTOMATED COUNT: 17.2 % (ref 11.6–15.1)
FRACTIONAL SHORTENING: 31 % (ref 28–44)
GFR SERPL CREATININE-BSD FRML MDRD: 13 ML/MIN/1.73SQ M
GLUCOSE SERPL-MCNC: 127 MG/DL (ref 65–140)
GLUCOSE SERPL-MCNC: 130 MG/DL (ref 65–140)
GLUCOSE SERPL-MCNC: 131 MG/DL (ref 65–140)
GLUCOSE SERPL-MCNC: 131 MG/DL (ref 65–140)
GLUCOSE SERPL-MCNC: 161 MG/DL (ref 65–140)
GLUCOSE SERPL-MCNC: 206 MG/DL (ref 65–140)
GLUCOSE SERPL-MCNC: 62 MG/DL (ref 65–140)
HCT VFR BLD AUTO: 25.4 % (ref 36.5–49.3)
HGB BLD-MCNC: 8.2 G/DL (ref 12–17)
INTERVENTRICULAR SEPTUM IN DIASTOLE (PARASTERNAL SHORT AXIS VIEW): 1.3 CM
INTERVENTRICULAR SEPTUM: 1.3 CM (ref 0.6–1.1)
LEFT ATRIUM SIZE: 4.6 CM
LEFT INTERNAL DIMENSION IN SYSTOLE: 2.9 CM (ref 2.1–4)
LEFT VENTRICULAR INTERNAL DIMENSION IN DIASTOLE: 4.2 CM (ref 3.5–6)
LEFT VENTRICULAR POSTERIOR WALL IN END DIASTOLE: 1.3 CM
LEFT VENTRICULAR STROKE VOLUME: 49 ML
LVSV (TEICH): 49 ML
LYMPHOCYTES # BLD AUTO: 0.34 THOUSAND/UL (ref 0.6–4.47)
LYMPHOCYTES # BLD AUTO: 2 % (ref 14–44)
MACROCYTES BLD QL AUTO: PRESENT
MAGNESIUM SERPL-MCNC: 1.8 MG/DL (ref 1.9–2.7)
MCH RBC QN AUTO: 33.2 PG (ref 26.8–34.3)
MCHC RBC AUTO-ENTMCNC: 32.3 G/DL (ref 31.4–37.4)
MCV RBC AUTO: 103 FL (ref 82–98)
MONOCYTES # BLD AUTO: 0.85 THOUSAND/UL (ref 0–1.22)
MONOCYTES NFR BLD: 5 % (ref 4–12)
MV MEAN GRADIENT: 6 MMHG
MV PEAK A VEL: 0.58 M/S
MV PEAK E VEL: 198 CM/S
MV PEAK GRADIENT: 21 MMHG
MV STENOSIS PRESSURE HALF TIME: 105 MS
MV VALVE AREA BY CONTINUITY EQUATION: 1.57 CM2
MV VALVE AREA P 1/2 METHOD: 2.1 CM2
NEUTROPHILS # BLD MANUAL: 15.73 THOUSAND/UL (ref 1.85–7.62)
NEUTS BAND NFR BLD MANUAL: 3 % (ref 0–8)
NEUTS SEG NFR BLD AUTO: 90 % (ref 43–75)
P AXIS: 79 DEGREES
PHOSPHATE SERPL-MCNC: 3.5 MG/DL (ref 2.3–4.1)
PLATELET # BLD AUTO: 172 THOUSANDS/UL (ref 149–390)
PLATELET BLD QL SMEAR: ADEQUATE
PMV BLD AUTO: 9.1 FL (ref 8.9–12.7)
POTASSIUM SERPL-SCNC: 4.3 MMOL/L (ref 3.5–5.3)
PROCALCITONIN SERPL-MCNC: 1.72 NG/ML
PULMONARY REGURGITATION LATE DIASTOLIC VELOCITY: 0.01 M/S
QRS AXIS: -64 DEGREES
QRSD INTERVAL: 180 MS
QT INTERVAL: 524 MS
QTC INTERVAL: 531 MS
RA PRESSURE ESTIMATED: 15 MMHG
RBC # BLD AUTO: 2.47 MILLION/UL (ref 3.88–5.62)
RBC MORPH BLD: PRESENT
RV PSP: 63 MMHG
SL CV AV DECELERATION TIME RETROGRADE: 1618 MS
SL CV AV PEAK GRADIENT RETROGRADE: 79 MMHG
SL CV LV EF: 65
SL CV PED ECHO LEFT VENTRICLE DIASTOLIC VOLUME (MOD BIPLANE) 2D: 81 ML
SL CV PED ECHO LEFT VENTRICLE SYSTOLIC VOLUME (MOD BIPLANE) 2D: 31 ML
SODIUM SERPL-SCNC: 129 MMOL/L (ref 135–147)
T WAVE AXIS: 74 DEGREES
TR MAX PG: 48 MMHG
TR PEAK VELOCITY: 3.5 M/S
TRICUSPID VALVE PEAK REGURGITATION VELOCITY: 3.45 M/S
VENTRICULAR RATE: 62 BPM
WBC # BLD AUTO: 16.91 THOUSAND/UL (ref 4.31–10.16)

## 2022-07-08 PROCEDURE — 87081 CULTURE SCREEN ONLY: CPT | Performed by: PHYSICIAN ASSISTANT

## 2022-07-08 PROCEDURE — 93010 ELECTROCARDIOGRAM REPORT: CPT | Performed by: INTERNAL MEDICINE

## 2022-07-08 PROCEDURE — 93308 TTE F-UP OR LMTD: CPT | Performed by: INTERNAL MEDICINE

## 2022-07-08 PROCEDURE — G0427 INPT/ED TELECONSULT70: HCPCS | Performed by: INTERNAL MEDICINE

## 2022-07-08 PROCEDURE — 99232 SBSQ HOSP IP/OBS MODERATE 35: CPT | Performed by: INTERNAL MEDICINE

## 2022-07-08 PROCEDURE — 84145 PROCALCITONIN (PCT): CPT | Performed by: INTERNAL MEDICINE

## 2022-07-08 PROCEDURE — 83735 ASSAY OF MAGNESIUM: CPT | Performed by: INTERNAL MEDICINE

## 2022-07-08 PROCEDURE — 36415 COLL VENOUS BLD VENIPUNCTURE: CPT | Performed by: INTERNAL MEDICINE

## 2022-07-08 PROCEDURE — 84100 ASSAY OF PHOSPHORUS: CPT | Performed by: INTERNAL MEDICINE

## 2022-07-08 PROCEDURE — 93005 ELECTROCARDIOGRAM TRACING: CPT

## 2022-07-08 PROCEDURE — 93325 DOPPLER ECHO COLOR FLOW MAPG: CPT | Performed by: INTERNAL MEDICINE

## 2022-07-08 PROCEDURE — 82948 REAGENT STRIP/BLOOD GLUCOSE: CPT

## 2022-07-08 PROCEDURE — 85007 BL SMEAR W/DIFF WBC COUNT: CPT | Performed by: INTERNAL MEDICINE

## 2022-07-08 PROCEDURE — C8924 2D TTE W OR W/O FOL W/CON,FU: HCPCS

## 2022-07-08 PROCEDURE — 85027 COMPLETE CBC AUTOMATED: CPT | Performed by: INTERNAL MEDICINE

## 2022-07-08 PROCEDURE — 99223 1ST HOSP IP/OBS HIGH 75: CPT | Performed by: INTERNAL MEDICINE

## 2022-07-08 PROCEDURE — 93321 DOPPLER ECHO F-UP/LMTD STD: CPT

## 2022-07-08 PROCEDURE — 82040 ASSAY OF SERUM ALBUMIN: CPT | Performed by: INTERNAL MEDICINE

## 2022-07-08 PROCEDURE — 93321 DOPPLER ECHO F-UP/LMTD STD: CPT | Performed by: INTERNAL MEDICINE

## 2022-07-08 PROCEDURE — 99223 1ST HOSP IP/OBS HIGH 75: CPT | Performed by: STUDENT IN AN ORGANIZED HEALTH CARE EDUCATION/TRAINING PROGRAM

## 2022-07-08 PROCEDURE — 71045 X-RAY EXAM CHEST 1 VIEW: CPT

## 2022-07-08 PROCEDURE — 93325 DOPPLER ECHO COLOR FLOW MAPG: CPT

## 2022-07-08 PROCEDURE — 80048 BASIC METABOLIC PNL TOTAL CA: CPT | Performed by: INTERNAL MEDICINE

## 2022-07-08 PROCEDURE — 5A1D70Z PERFORMANCE OF URINARY FILTRATION, INTERMITTENT, LESS THAN 6 HOURS PER DAY: ICD-10-PCS | Performed by: INTERNAL MEDICINE

## 2022-07-08 RX ORDER — VANCOMYCIN HYDROCHLORIDE 1 G/200ML
10 INJECTION, SOLUTION INTRAVENOUS 3 TIMES WEEKLY
Status: DISCONTINUED | OUTPATIENT
Start: 2022-07-08 | End: 2022-07-11

## 2022-07-08 RX ORDER — DEXTROSE MONOHYDRATE 25 G/50ML
INJECTION, SOLUTION INTRAVENOUS
Status: COMPLETED
Start: 2022-07-08 | End: 2022-07-08

## 2022-07-08 RX ORDER — ACETAMINOPHEN 325 MG/1
650 TABLET ORAL EVERY 6 HOURS PRN
Status: DISCONTINUED | OUTPATIENT
Start: 2022-07-08 | End: 2022-07-17 | Stop reason: HOSPADM

## 2022-07-08 RX ADMIN — SEVELAMER HYDROCHLORIDE 800 MG: 800 TABLET, FILM COATED PARENTERAL at 12:33

## 2022-07-08 RX ADMIN — SEVELAMER HYDROCHLORIDE 800 MG: 800 TABLET, FILM COATED PARENTERAL at 07:42

## 2022-07-08 RX ADMIN — ACETAMINOPHEN 650 MG: 325 TABLET ORAL at 16:54

## 2022-07-08 RX ADMIN — SODIUM BICARBONATE 650 MG: 650 TABLET ORAL at 07:42

## 2022-07-08 RX ADMIN — ACETAMINOPHEN 650 MG: 325 TABLET ORAL at 02:58

## 2022-07-08 RX ADMIN — MIDODRINE HYDROCHLORIDE 5 MG: 5 TABLET ORAL at 09:15

## 2022-07-08 RX ADMIN — VANCOMYCIN HYDROCHLORIDE 1000 MG: 1 INJECTION, SOLUTION INTRAVENOUS at 18:06

## 2022-07-08 RX ADMIN — CEFEPIME HYDROCHLORIDE 1000 MG: 1 INJECTION, SOLUTION INTRAVENOUS at 04:43

## 2022-07-08 RX ADMIN — TAMSULOSIN HYDROCHLORIDE 0.4 MG: 0.4 CAPSULE ORAL at 18:10

## 2022-07-08 RX ADMIN — INSULIN GLARGINE 10 UNITS: 100 INJECTION, SOLUTION SUBCUTANEOUS at 22:28

## 2022-07-08 RX ADMIN — METOPROLOL TARTRATE 50 MG: 50 TABLET, FILM COATED ORAL at 04:42

## 2022-07-08 RX ADMIN — PERFLUTREN 0.4 ML/MIN: 6.52 INJECTION, SUSPENSION INTRAVENOUS at 12:10

## 2022-07-08 RX ADMIN — DEXTROSE MONOHYDRATE: 25 INJECTION, SOLUTION INTRAVENOUS at 16:54

## 2022-07-08 RX ADMIN — METOPROLOL TARTRATE 50 MG: 50 TABLET, FILM COATED ORAL at 17:02

## 2022-07-08 RX ADMIN — SEVELAMER HYDROCHLORIDE 800 MG: 800 TABLET, FILM COATED PARENTERAL at 17:02

## 2022-07-08 RX ADMIN — APIXABAN 5 MG: 5 TABLET, FILM COATED ORAL at 17:02

## 2022-07-08 NOTE — PROGRESS NOTES
07/08/22 1757   Repeat Volume Status and Tissue Perfusion Assessment Performed   Repeat Volume Status and Tissue Perfusion Assessment Performed Yes   Volume Status and Tissue Perfusion Post Fluid Resuscitation * Must Document All *   Vital Signs Reviewed (HR, RR, BP, T) Yes   Cardio Regular rate and rhythm; No murmor; No rub or gallop   Pulmonary (!) Tachypnea;Rhonchi   Capillary Refill Brisk   Skin Warm;Dry   Urine output assessed Other (comment)  (Patient with end-stage renal disease on dialysis)

## 2022-07-08 NOTE — ASSESSMENT & PLAN NOTE
· Secondary to diabetic foot infection present on admission with fever and leukocytosis  · Lactic acidosis resolved  · Will follow-up bone scan of left foot  · Will continue IV antibiotics with vancomycin    Infectious Disease recommended discontinuing cefepime  · Trend procalcitonin  · Blood cultures positive for gram-positive cocci  · Podiatry consultation

## 2022-07-08 NOTE — CONSULTS
Consultation - Nephrology   Emani Speaker 74 y o  male MRN: [id number]  Unit/Bed#: ED 21 Encounter: [id number]      A/P:  1  ESRD hemodialysis dependent   - receives dialysis on MWF at [facility] S[facility]   - will order dialysis today with a 3K bath   - UF to EDW 89  3 kg      2  Persistent atrial fibrillation   - has good rate control    3  Severe sepsis with lactic acidosis   - has blood culture pos with GPC   - receiving vancomycin and cefepime as per ID rec    4  Diabetic foot infection of left foot   - has a failed skin graft due to chemotherapy    5  Anemia of CKD   - will hold Epogen due to colon cancer   - transfuse for Hb < 7 5 due to cardiac issues    6  Metabolic acidosis   - resolved   - stop oral sodium bicarbonate    7  Secondary hyperparathyroidism of renal origin   - receiving sevelamer   - check phosphorus and albumin   - start Glucerna for protein restoration         Thank you for allowing us to participate in the care of your patient  Please feel free to contact us regarding the care of this patient, or any other questions/concerns that may be applicable      Patient Active Problem List   Diagnosis    Bilateral leg edema    Diabetic ulcer of toe of left foot associated with type 2 diabetes mellitus ([address] )    Easy bruising    Eczema    Erectile dysfunction of non-organic origin    Hyperlipidemia    Uremic acidosis    Arthritis    Peripheral arterial disease (HCC)    PVCs (premature ventricular contractions)    Rhinitis    Systolic murmur    Vertigo    Complete heart block (HCC)    Metabolic acidosis    ELZBIETA (acute kidney injury) (HCC)    Other hyperlipidemia    Persistent atrial fibrillation (HCC)    Persistent proteinuria    Volume overload    Urinary retention    NICOLASA (obstructive sleep apnea)    Type 2 diabetes mellitus with chronic kidney disease, with long-term current use of insulin (HCC)    Hypertension    Stage 4 chronic kidney disease (HCC)    Nonrheumatic aortic valve stenosis    Anemia    Fever    Mitral stenosis    Abnormal CT of the chest    Hyperkalemia    Acute pulmonary edema (HCC)    Chronic diastolic (congestive) heart failure (HCC)    Left renal artery stenosis (HCC)    S/P amputation of lesser toe, left (HCC)    S/P amputation of lesser toe, right (HCC)    Elevated troponin I level    Staphylococcus aureus bacteremia    Type 2 diabetes mellitus with diabetic neuropathy, without long-term current use of insulin (HCC)    Hyponatremia    Iron deficiency anemia    Anxiety    Osteomyelitis of left foot (HCC)    Amputation of left great toe (HCC)    Multiple drug resistant organism (MDRO) culture positive    Renovascular hypertension    SSS (sick sinus syndrome) (HCC)    Chronic obstructive pulmonary disease (HCC)    Absence of toe (HCC)    Chronic painful diabetic neuropathy (HCC)    Onychomycosis    Colon cancer (HCC)    Acute kidney injury superimposed on chronic kidney disease ([facility]    RSV (respiratory syncytial virus pneumonia)    Chemotherapy-induced neutropenia (HCC)    Chemotherapy-induced nausea    Acute on chronic diastolic congestive heart failure (HCC)    Other iron deficiency anemias    Pulmonary hypertension (HCC)    S/P arteriovenous (AV) fistula creation    Severe sepsis with lactic acidosis (HCC)    Diabetic infection of left foot ([facility] )       History of Present Illness   Physician Requesting Consult: Kota Weeks, *  Reason for Consult / Principal Problem: End stage renal disease   Hx and PE limited by:   HPI: Duke Sommers is a 74y o  year old male who presents with severe sepsis with lactic acidosis due to a diabetic foot infection  He had chemotherapy last Tuesday  He has a skin graft of his left foot which did not take due to chemo  He developed fever and was admitted  He has a history of sigmoid colon cancer in underwent chemotherapy last Tuesday 7/5    He then presented to the emergency department yesterday with fever, left foot swelling, redness and confusion  He had attempt that was elevated to 103° chest x-ray was unremarkable  X-ray of the foot did not show focus of infection  Blood cultures were taken which are growing Gram-positive cocci in pairs chains and clusters  Was empirically placed on vancomycin and cefepime  Has a right PermCath    He initiated dialysis in April of this year due to worsening azotemia and affects of chemotherapy  He also had volume overload with marked pulmonary edema and increasing lower extremity edema  He has outpatient dialysis at [facility] on Monday Wednesday Friday and will have dialysis today    He has a history of diabetes mellitus insulin requiring, secondary hyperparathyroidism , hypertension and dyslipidemia    History obtained from chart review and the patient    Constitutional ROS- Has fatigue, fever, chills, night sweats, weight changes  HEENT ROS- Denies history of eye surgeries, glaucoma, headaches or history of trauma, blurred vision     Endocrine ROS- Long history diabetes mellitus no thyroid disease  Cardiovascular ROS- Denies chest pain, palpitation, dyspnea exertion, orthopnea, claudication  Pulmonary ROS- Denies history of COPD, asthma  Denies cough, hemoptysis, shortness of breath  GI ROS- Denies abdominal pain, diarrhea, nausea, swallowing problems, vomiting, constipation, blood in stools, fecal incontinence  Hematological ROS- Denies history of easy bruising, blood clots, bleeding or blood transfusions  Genitourinary ROS- Denies recent hematuria, pyuria, flank pain, change in urinary stream, decreased urinary output, increased urinary frequency, nocturia, foamy urine, or urinary incontinence  Lymphatic ROS- Denies lymphadenopathy  Musculoskeletal ROS- Has history of muscle weakness, joint pain  Dermatological ROS- Has rash, wounds,no  ulcers, itching, jaundice  Psychiatric ROS- Denies anxiety, depression,     Neurological ROS- No stroke or TIA symptoms        Historical Information   Past Medical History:   Diagnosis Date    Anemia     iron def    Arthritis     OA    Bruise of both arms     forearms and both hands    Bruises easily     Cancer ([facility] 09/01/2021    colon    CH[facility]congestive heart failure) ([facility])     Chronic kidney disease     CPAP (continuous positive airway pressure) dependence     Diabetes mellitus ([facility] )     [facility]     Eczema     Erectile dysfunction     Gout     Heart murmur     History of permanent cardiac pacemaker placement 11/2018    History of transfusion     Hyperlipidemia     Hypertension     Hypoglycemia 03/08/2019    Murmur     Nephropathy     Osteoarthritis     Pacemaker 11/06/2018    PAD (peripheral artery disease) ([facility])     Seasonal allergies     Sleep apnea     Toe infection     bilat great toes    Vertigo     Walks frequently     Wears dentures     upper    Wears glasses      Past Surgical History:   Procedure Laterality Date    CARDIAC PACEMAKER PLACEMENT      CARPAL TUNNEL RELEASE Left     CARPAL TUNNEL RELEASE Right     CARPAL TUNNEL RELEASE      COLONOSCOPY  08/2020    COLONOSCOPY      FL GUIDED CENTRAL VENOUS ACCESS DEVICE INSERTION  01/11/2022    FOOT AMPUTATION Left 02/10/2020    Procedure: PARTIAL 1ST RAY AMPUTATION;  Surgeon: Chao Velazquez DPM;  [facility];  Service: Podiatry    INCISION AND DRAINAGE OF WOUND Left 01/12/2020    Procedure: INCISION AND DRAINAGE (I&D) EXTREMITY AND REMOVAL OF SESMOID BONE;  Surgeon: Peyton Garcia DPM;  [facility];  Service: Podiatry    IR OTHER  01/21/2022    IR PICC REPOSITION  01/14/2020    IR TUNNELED DIALYSIS CATHETER PLACEMENT  04/26/2022    KNEE ARTHROSCOPY Left     KNEE ARTHROSCOPY Right     KNEE SURGERY      KS AMPUTATION TOE,I-P JT Bilateral 05/02/2017    Procedure: PARTIAL AMPUTATION RIGHT AND LEFT HALLUX ;  Surgeon: Chao Velazquez DPM;  [facility];  Service: Podiatry    KS AMPUTATION TOE,MT-P JT Left 2017    Procedure: 2ND TOE AMPUTATION;  Surgeon: Eugenio Mendiola DPM;  [facility];  Service: Podiatry   [address] [place] [address] Left 2022    Procedure: CREATION FISTULA ARTERIOVENOUS (AV) RADIOCEPHALIC;  Surgeon: Loretta Angeles MD;  [facility];  Service: Vascular    SC INSJ TUNNELED CTR VAD W/SUBQ PORT AGE 5 YR/> N/A 2022    Procedure: INSERTION VENOUS PORT ( PORT-A-CATH) IR;  Surgeon: Bobbi Armijo DO;  [facility];  Service: Interventional Radiology    RESECTION LOW ANTERIOR LAPAROSCOPIC N/A 10/21/2021    Procedure: RESECTION LOW ANTERIOR LAPAROSCOPIC;  Surgeon: Tasha Guzman MD;  [facility];  Service: Colorectal    SHOULDER ARTHROSCOPY Left     with screws,RTC    SHOULDER SURGERY Left     rotator cuff    TOE AMPUTATION Left 2020    Procedure: [place];  Surgeon: Chuck Bee DPM;  [facility];  Service: Podiatry    UPPER GASTROINTESTINAL ENDOSCOPY  2020    Intestinal metaplasia prepyloric    VASECTOMY       Social History   Social History     Substance and Sexual Activity   Alcohol Use Never    Alcohol/week: 0 0 standard drinks     Social History     Substance and Sexual Activity   Drug Use No     Social History     Tobacco Use   Smoking Status Former Smoker    Packs/day: 0 50    Years: 20 00    Pack years: 10 00    Types: Cigarettes    Start date: 1    Quit date:     Years since quittin 5   Smokeless Tobacco Never Used   Tobacco Comment    quit 10 years ago     Family History   Problem Relation Age of Onset    Heart disease Father         passed away    Hypertension Father     Pulmonary embolism Father     Hypertension Mother         assed away       Meds/Allergies   all current active meds have been reviewed, current meds:   Current Facility-Administered Medications   Medication Dose Route Frequency    acetaminophen (TYLENOL) tablet 650 mg  650 mg Oral Q6H PRN  amLODIPine (NORVASC) tablet 10 mg  10 mg Oral Daily    insulin glargine (LANTUS) subcutaneous injection 10 Units 0 1 mL  10 Units Subcutaneous HS    insulin lispro (HumaLOG) 100 units/mL subcutaneous injection 1-6 Units  1-6 Units Subcutaneous TID AC    metoprolol tartrate (LOPRESSOR) tablet 50 mg  50 mg Oral Q12H    midodrine (PROAMATINE) tablet 5 mg  5 mg Oral Once per day on Mon Wed Fri    sevelamer (RENAGEL) tablet 800 mg  800 mg Oral TID With Meals    sodium bicarbonate tablet 650 mg  650 mg Oral BID after meals    tamsulosin (FLOMAX) capsule 0 4 mg  0 4 mg Oral Daily With Dinner    torsemide [facility]) tablet 80 mg  80 mg Oral Once per day on Sun Tue Thu Sat    vancomycin (VANCOCIN) IVPB (premix in dextrose) 1,000 mg 200 mL  10 mg/kg Intravenous Q24H     Facility-Administered Medications Ordered in Other Encounters   Medication Dose Route Frequency    [MAR Hold] dextrose 5 % infusion  20 mL/hr Intravenous Once    [MAR Hold] sodium chloride 0 9 % infusion  20 mL/hr Intravenous Once PRN    and PTA meds:  (Not in a hospital admission)        Allergies   Allergen Reactions    Invokana [Canagliflozin] Other (See Comments)     Caused circulation problems    Lamisil [Terbinafine] Blisters     Wife states " His skin peeled from head to toe"    Other Swelling     Pomegranate - facial swelling, no swelling of tongue, esophagus  Adhesive tape   Latex Rash       Objective     Intake/Output Summary (Last 24 hours) at 7/8/2022 1219  Last data filed at 7/7/2022 1715  Gross per 24 hour   Intake 2945 ml   Output --   Net 2945 ml       Invasive Devices:        Physical Exam      I/O last 3 completed shifts: In: 9660 [IV [id number]]  Out: -     Vitals:    07/08/22 0440   BP: 111/56   Pulse: 60   Resp: 20   Temp: 98 9 °F (37 2 °C)   SpO2: 93%       General Appearance: In acute distress  Due to feling chilled Cooperative  Appears stated age  Head:    Normocephalic  Atraumatic  Normal jaw occlusion  Eyes:    Lids, conjunctiva normal  No scleral icterus  Ears:    Normal external ears  Nose:   Nares normal  No drainage  Mouth:   Lips, tongue normal  Mucosa normal  Phonation normal    Neck:   Supple  Symmetrical    Back:     Symmetric  No CVA tenderness  Lungs:     Normal respiratory effort  Clear to auscultation bilaterally  Chest wall:    No tenderness or deformity  Heart:    Regular rate and rhythm  Normal S1 and S2  No murmur  No JVD  No edema  Abdomen:     Soft  Non-tender  Bowel sounds active  Genitourinary:   No Jones catheter present  Extremities:   Extremities left foot red and 1+ edema with tenderness   Skin:   Warm and dry  No pallor, jaundice, rash, ecchymoses  Neurologic:   Alert and oriented to person, place, time  No focal deficit  Current Weight: Weight - Scale: 89 8 kg (198 lb)  First Weight: Weight - Scale: 89 8 kg (198 lb)    Lab Results:  I have personally reviewed pertinent labs      CBC:   Lab Results   Component Value Date    WBC 16 91 (H) 07/08/2022    HGB 8 2 (L) 07/08/2022    HCT 25 4 (L) 07/08/2022     (H) 07/08/2022     07/08/2022    MCH 33 2 07/08/2022    MCHC 32 3 07/08/2022    RDW 17 2 (H) 07/08/2022    MPV 9 1 07/08/2022    NRBC 0 07/07/2022     CMP:   Lab Results   Component Value Date    K 4 3 07/08/2022    CL 95 (L) 07/08/2022    CO2 25 07/08/2022    BUN 45 (H) 07/08/2022    CREATININE 4 17 (H) 07/08/2022    CALCIUM 8 4 07/08/2022    AST 9 (L) 07/07/2022    ALT 10 07/07/2022    ALKPHOS 93 07/07/2022    EGFR 13 07/08/2022     Phosphorus: No results found for: PHOS  Magnesium:   Lab Results   Component Value Date    MG 1 8 (L) 07/08/2022     Urinalysis: No results found for: Arnav Harrison, SPECGRAV, PHUR, LEUKOCYTESUR, NITRITE, PROTEINUA, GLUCOSEU, KETONESU, BILIRUBINUR, BLOODU  Ionized Calcium: No results found for: NAYELI  Coagulation:   Lab Results   Component Value Date    INR 2 15 (H) 07/07/2022     Troponin: No results found for: TROPONINI  ABG: No results found for: PHART, HUB2ZLA, PO2ART, AXH1ESD, Y6LDSSHG, BEART, SOURCE    Results from last 7 days   Lab Units 07/08/22  0447 07/07/22  1300 07/02/22  0927   POTASSIUM mmol/L 4 3 4 8 4 6   CHLORIDE mmol/L 95* 90* 100   CO2 mmol/L 25 28 28   BUN mg/dL 45* 37* 39*   CREATININE mg/dL 4 17* 3 53* 3 15*   CALCIUM mg/dL 8 4 8 9 9 0   ALK PHOS U/L  --  93 109   ALT U/L  --  10 25   AST U/L  --  9* 17       Radiology review:  Procedure: XR chest 1 view portable    Result Date: 7/7/2022  Narrative: CHEST INDICATION:   sepsis  COMPARISON:  Chest radiograph 4/25/2022  EXAM PERFORMED/VIEWS:  XR CHEST PORTABLE FINDINGS:  Left-sided chest wall intracardiac device is identified  Leads are intact  Right chest wall port with catheter tip at the cavoatrial junction  Right IJ dialysis catheter with tip in the right atrium  Heart shadow is enlarged but unchanged from prior exam  The lungs are clear  No pneumothorax or pleural effusion  Osseous structures appear within normal limits for patient age  Impression: No acute cardiopulmonary disease  Workstation performed: [id number]     Procedure: XR foot 3+ views LEFT    Result Date: 7/7/2022  Narrative: LEFT FOOT INDICATION:   L foot swelling and pain  COMPARISON:  3/24/2020 VIEWS:  XR FOOT 3+ VW LEFT FINDINGS: Patient is status post amputation 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 at the level of the metatarsal mid shaft  Cortical margins are sharp  The patient is also status post resection of the 2nd digit at the level of the proximal phalanx  Cortical margins are also sharp and this region  Calcaneal spur(s) noted  No lytic or blastic osseous lesion  There is soft tissue swelling noted at the level of the 2nd digit  Impression: No radiographic evidence for osteomyelitis  Status post amputation of the 1st and 2nd digits as described above   Workstation performed: [id number]         EKG, Pathology, and Other Studies: I have reviewed the CXR personally which shows cardiomegaly but no volume overload      Counseling / Coordination of Care  Total ADDITIONAL floor / unit time spent today 40 minutes  Greater than 50% of total time was spent with the patient and / or family counseling and / or coordination of care  A description of the counseling / coordination of care: discussed with primary team    Hussein Carlin MD      This consultation note was produced in part using a dictation device which may document imprecise wording from author's original intent

## 2022-07-08 NOTE — HEMODIALYSIS
Post-Dialysis RN Treatment Note    Blood Pressure:  Pre 140/71 mm/Hg  Post 106/83 mmHg   EDW:   TBD   Weight:  Pre 94 kg   Post none   Mode of weight measurement:  bed scale   Volume Removed  541 ml    Treatment duration:  180 minutes    NS given:  200 ml x 1 for nausea/sbp<100   Treatment shortened:  Yes  Patient with increased work of breathing  Pulse ox 83% on 2 lpm nasal cannula  Treatment terminated  Request Dr Eileen Luong to bedside  Returned to ED 21 for cxr, blood glucose and tylenol  Medications given during Rx:  none   Estimated Kt/V:  none   Access type:  RIJ Permacath   Access Issues:   Maintains 400 bfr  Report called to primary nurse:   Verbal at bedside to Eric Lawson RN    **[phone]  [employer] update to Dr Louis Mccullough

## 2022-07-08 NOTE — CONSULTS
Consult - Podiatry   Kimi Santos 74 y o  male MRN: [id number]  Unit/Bed#: ED 21 Encounter: [id number]    Assessment/Plan     Assessment:  1  Severe sepsis - POA  2  Left foot cellulitis with diabetic foot ulcer    3  Bacteriemia - likely source foot infection  4  Diabetic neuropathy 6 0% 2/2022     Plan:  - Suspect residual 1st met stump and 2nd ray osteomyelitis with possible abscess in the dead space  I will obtain [employer] bone scan for further evaluation  Patient will require podiatric surgical intervention if bone scan is positive for OM  - LEADs pending   - reviewed x-ray, No radiographic evidence for osteomyelitis  - Continue IV antibiotics as per ID, appreciate recommendations  - wound care orders place for nursing    - rest of care per primary service     Lab Results   Component Value Date    HGBA1C 6 0 (H) 02/18/2022       History of Present Illness     HPI:  Kimi Santos is a 74 y o  male with past medical history of colon cancer actively receiving chemotherapy, type 2 diabetes presents with left foot cellulitis and nonhealing wound of 1 year  Patient reported he was sent it in the ER for evaluation of fevers at home  He has been receiving wound care treatments by Dr Kerri Corbin every other week  He has a history of partial 1st ray amputation  He denies other complaints at this time  Consults  Review of Systems   Constitutional: Negative  HENT: Negative  Eyes: Negative  Respiratory: Negative  Cardiovascular: Negative  Gastrointestinal: Negative  Musculoskeletal:  Left foot pain   Skin:  Left foot wound and erythema   Neurological: Negative  Psych: negative         Historical Information   Past Medical History:   Diagnosis Date    Anemia     iron def    Arthritis     OA    Bruise of both arms     forearms and both hands    Bruises easily     Cancer ([address] ) 09/01/2021    colon    CHF (congestive heart failure) (HCC)     Chronic kidney disease     CPAP (continuous positive airway pressure) dependence     Diabetes mellitus [facility]     Diabetic foot ulcer ([facility]     Eczema     Erectile dysfunction     Gout     Heart murmur     History of permanent cardiac pacemaker placement 11/2018    History of transfusion     Hyperlipidemia     Hypertension     Hypoglycemia 03/08/2019    Murmur     Nephropathy     Osteoarthritis     Pacemaker 11/06/2018    [facility]peripheral [facility]     Seasonal allergies     Sleep apnea     Toe infection     bilat great toes    Vertigo     Walks frequently     Wears dentures     upper    Wears glasses      Past Surgical History:   Procedure Laterality Date    CARDIAC PACEMAKER PLACEMENT      CARPAL TUNNEL RELEASE Left     CARPAL TUNNEL RELEASE Right     CARPAL TUNNEL RELEASE      COLONOSCOPY  08/2020    COLONOSCOPY      FL GUIDED CENTRAL VENOUS ACCESS DEVICE INSERTION  01/11/2022    FOOT AMPUTATION Left 02/10/2020    Procedure: PARTIAL 1ST RAY AMPUTATION;  Surgeon: Rufus Castillo DPM;  [facility];  Service: Podiatry    INCISION AND DRAINAGE OF WOUND Left 01/12/2020    Procedure: INCISION AND DRAINAGE (I&D) EXTREMITY AND REMOVAL OF SESMOID BONE;  Surgeon: Krupa Obrien DPM;  [facility];  Service: Podiatry    IR OTHER  01/21/2022    IR PICC REPOSITION  01/14/2020    IR TUNNELED DIALYSIS CATHETER PLACEMENT  04/26/2022    KNEE ARTHROSCOPY Left     KNEE ARTHROSCOPY Right     KNEE SURGERY      OK AMPUTATION TOE,I-P JT Bilateral 05/02/2017    Procedure: PARTIAL AMPUTATION RIGHT AND LEFT HALLUX ;  Surgeon: Rufus Castillo DPM;  [facility];  Service: Podiatry    OK AMPUTATION TOE,MT-P JT Left 07/25/2017    Procedure: 2ND TOE AMPUTATION;  Surgeon: Rufus Castillo DPM;  [facility];  Service: Podiatry    OK ANASTOMOSIS,AV,ANY SITE Left 6/1/2022    Procedure: CREATION FISTULA ARTERIOVENOUS (AV) RADIOCEPHALIC;  Surgeon: Eva Sanz MD;  [facility];  Service: Vascular    OK Cathy Chery TUNNELED CTR VAD W/SUBQ PORT AGE 5 YR/> N/A 2022    Procedure: INSERTION VENOUS PORT ( PORT-A-CATH) IR;  Surgeon: Gerald Brock DO;  [facility];  Service: Interventional Radiology    RESECTION LOW ANTERIOR LAPAROSCOPIC N/A 10/21/2021    Procedure: RESECTION LOW ANTERIOR LAPAROSCOPIC;  Surgeon: Makenna Corbin MD;  [facility];  Service: Colorectal    SHOULDER ARTHROSCOPY Left     with screws,RTC    SHOULDER SURGERY Left     rotator cuff    TOE AMPUTATION Left 2020    Procedure: Zoraida Market;  Surgeon: Rema Perry DPM;  [facility];  Service: Podiatry    UPPER GASTROINTESTINAL ENDOSCOPY  2020    Intestinal metaplasia prepyloric    VASECTOMY       Social History   Social History     Substance and Sexual Activity   Alcohol Use Never    Alcohol/week: 0 0 standard drinks     Social History     Substance and Sexual Activity   Drug Use No     Social History     Tobacco Use   Smoking Status Former Smoker    Packs/day: 0 50    Years: 20 00    Pack years: 10 00    Types: Cigarettes    Start date: 1    Quit date:     Years since quittin 5   Smokeless Tobacco Never Used   Tobacco Comment    quit 10 years ago     Family History:   Family History   Problem Relation Age of Onset    Heart disease Father         passed away    Hypertension Father     Pulmonary embolism Father     Hypertension Mother         assed away       Meds/Allergies   (Not in a hospital admission)    Allergies   Allergen Reactions    Invokana [Canagliflozin] Other (See Comments)     Caused circulation problems    Lamisil [Terbinafine] Blisters     Wife states " His skin peeled from head to toe"    Other Swelling     Pomegranate - facial swelling, no swelling of tongue, esophagus  Adhesive tape        Latex Rash       Objective   First Vitals:   Blood Pressure: 129/89 (22 1237)  Pulse: 62 (22 1237)  Temperature: (!) 103 3 °F (39 6 °C) (22 1237)  Temp Source: Oral (07/07/22 1237)  Respirations: 18 (07/07/22 1237)  Height: 5' 10" (177 8 cm) (07/07/22 1237)  Weight - Scale: 89 8 kg (198 lb) (07/07/22 1237)  SpO2: 91 % (07/07/22 1237)    Current Vitals:   Blood Pressure: 114/58 (07/08/22 1200)  Pulse: 68 (07/08/22 1200)  Temperature: 98 9 °F (37 2 °C) (07/08/22 0440)  Temp Source: Oral (07/08/22 0440)  Respirations: 20 (07/08/22 0440)  Height: 5' 10" (177 8 cm) (07/08/22 1200)  Weight - Scale: 89 8 kg (197 lb 15 6 oz) (07/08/22 1200)  SpO2: 93 % (07/08/22 0440)        /58   Pulse 68   Temp 98 9 °F (37 2 °C) (Oral)   Resp 20   Ht 5' 10" (1 778 m)   Wt 89 8 kg (197 lb 15 6 oz)   SpO2 93%   BMI 28 41 kg/m²      General Appearance:    Alert, cooperative, no distress   Head:    Normocephalic, without obvious abnormality, atraumatic   Eyes:    PERRL, conjunctiva/corneas clear, EOM's intact        Nose:   Moist mucous membranes   Neck:   Supple, symmetrical, trachea midline   Back:     Symmetric   Lungs:     Respirations unlabored   Heart:    Regular rate and rhythm, S1 and S2 normal, no murmur, rub   or gallop   Abdomen:     Soft, non-tender   Extremities:   History of left 1st ray amputation  MMT 4/5  Pulses:   Faintly palpable pedal pulses  Foot is warm to touch  Skin:   Left plantar medial distal wound probable to periosteum the 2nd met with purulent drainage and malodor noted  Erythema present from the wound extending proximally to his ankle  Clinical signs of infections are present  Neurologic:   Gross sensation is intact  Protective sensation is diminished             Lab Results:   Admission on 07/07/2022   Component Date Value    WBC 07/07/2022 16 54 (A)    RBC 07/07/2022 2 70 (A)    Hemoglobin 07/07/2022 9 1 (A)    Hematocrit 07/07/2022 27 5 (A)    MCV 07/07/2022 102 (A)    MCH 07/07/2022 33 7     MCHC 07/07/2022 33 1     RDW 07/07/2022 17 1 (A)    MPV 07/07/2022 9 4     Platelets 16/75/1440 215     nRBC 07/07/2022 0     Neutrophils Relative 07/07/2022 89 (A)    Immat GRANS % 07/07/2022 1     Lymphocytes Relative 07/07/2022 4 (A)    Monocytes Relative 07/07/2022 6     Eosinophils Relative 07/07/2022 0     Basophils Relative 07/07/2022 0     Neutrophils Absolute 07/07/2022 14 74 (A)    Immature Grans Absolute 07/07/2022 0 11     Lymphocytes Absolute 07/07/2022 0 62     Monocytes Absolute 07/07/2022 1 06     Eosinophils Absolute 07/07/2022 0 00     Basophils Absolute 07/07/2022 0 01     Sodium 07/07/2022 130 (A)    Potassium 07/07/2022 4 8     Chloride 07/07/2022 90 (A)    CO2 07/07/2022 28     ANION GAP 07/07/2022 12     BUN 07/07/2022 37 (A)    Creatinine 07/07/2022 3 53 (A)    Glucose 07/07/2022 182 (A)    Calcium 07/07/2022 8 9     AST 07/07/2022 9 (A)    ALT 07/07/2022 10     Alkaline Phosphatase 07/07/2022 93     Total Protein 07/07/2022 6 8     Albumin 07/07/2022 3 5     Total Bilirubin 07/07/2022 1 10 (A)    eGFR 07/07/2022 16     LACTIC ACID 07/07/2022 3 2 (A)    Procalcitonin 07/07/2022 0 92 (A)    Protime 07/07/2022 23 5 (A)    INR 07/07/2022 2 15 (A)    PTT 07/07/2022 38 (A)    Gram Stain Result 07/07/2022 Gram positive cocci in pairs, chains and clusters (A)    Blood Culture 07/07/2022 Staphylococcus aureus (A)    Gram Stain Result 07/07/2022 Gram positive cocci in pairs, chains and clusters (A)    SARS-CoV-2 07/07/2022 Negative     INFLUENZA A PCR 07/07/2022 Negative     INFLUENZA B PCR 07/07/2022 Negative     RSV PCR 07/07/2022 Negative     LACTIC ACID 07/07/2022 1 9     Ventricular Rate 07/07/2022 62     Atrial Rate 07/07/2022 267     QRSD Interval 07/07/2022 168     QT Interval 07/07/2022 448     QTC Interval 07/07/2022 454     QRS Axis 07/07/2022 -73     T Wave Axis 07/07/2022 80     POC Glucose 07/07/2022 232 (A)    POC Glucose 07/07/2022 177 (A)    Procalcitonin 07/08/2022 1 72 (A)    Sodium 07/08/2022 129 (A)    Potassium 07/08/2022 4 3     Chloride 07/08/2022 95 (A)    CO2 07/08/2022 25     ANION GAP 07/08/2022 9     BUN 07/08/2022 45 (A)    Creatinine 07/08/2022 4 17 (A)    Glucose 07/08/2022 127     Calcium 07/08/2022 8 4     eGFR 07/08/2022 13     WBC 07/08/2022 16 91 (A)    RBC 07/08/2022 2 47 (A)    Hemoglobin 07/08/2022 8 2 (A)    Hematocrit 07/08/2022 25 4 (A)    MCV 07/08/2022 103 (A)    MCH 07/08/2022 33 2     MCHC 07/08/2022 32 3     RDW 07/08/2022 17 2 (A)    MPV 07/08/2022 9 1     Platelets 30/94/8707 172     Magnesium 07/08/2022 1 8 (A)    Segmented % 07/08/2022 90 (A)    Bands % 07/08/2022 3     Lymphocytes % 07/08/2022 2 (A)    Monocytes % 07/08/2022 5     Eosinophils, % 07/08/2022 0     Basophils % 07/08/2022 0     Absolute Neutrophils 07/08/2022 15 73 (A)    Lymphocytes Absolute 07/08/2022 0 34 (A)    Monocytes Absolute 07/08/2022 0 85     Eosinophils Absolute 07/08/2022 0 00     Basophils Absolute 07/08/2022 0 00     RBC Morphology 07/08/2022 Present     Anisocytosis 07/08/2022 Present     Macrocytes 07/08/2022 Present     Platelet Estimate 84/32/3915 Adequate     POC Glucose 07/08/2022 131     ALL TARGETS 07/07/2022 Not Detected     AV area peak ruben 07/08/2022 1 7     AV peak gradient 07/08/2022 79     LA size 07/08/2022 4 6     Aortic valve mean veloci* 07/08/2022 21 10     Pulmonary regurgitation * 07/08/2022 0 01     Triscuspid Valve Regurgi* 07/08/2022 48 0     Tricuspid valve peak reg* 07/08/2022 3 45     LVPWd 07/08/2022 1 30     TR Peak Ruben 07/08/2022 3 5     IVSd 07/08/2022 2 21     LV DIASTOLIC VOLUME (MOD* 85/28/3511 81     LEFT VENTRICLE SYSTOLIC * 57/44/7051 31     Left ventricular stroke * 07/08/2022 49 00     AV Deceleration Time 07/08/2022 1,618     LVIDd 07/08/2022 4 20     IVS 07/08/2022 1 3     LVIDS 07/08/2022 2 90     FS 07/08/2022 31     Asc Ao 07/08/2022 3 6     Ao root 07/08/2022 3 50     LVOT mn grad 07/08/2022 4 0     AV area by cont VTI 07/08/2022 1 6     AV regurgitation pressur* 07/08/2022 469     AV mean gradient 07/08/2022 19     AV LVOT peak gradient 07/08/2022 8     MV mean gradient antegra* 07/08/2022 6     MV valve area p 1/2 meth* 07/08/2022 2 10     E wave deceleration time 07/08/2022 363     LVOT diameter 07/08/2022 2 1     LVOT peak ruben 07/08/2022 1 44     LVOT peak VTI 07/08/2022 29 75     Ao peak ruben retrograde 07/08/2022 2 85     Ao VTI 07/08/2022 63 3     LVOT stroke volume 07/08/2022 102 99     AV peak gradient 07/08/2022 33     MV peak gradient antegra* 07/08/2022 21     MV Peak E Ruben 07/08/2022 198     MV VTI 07/08/2022 65 49     MV Peak A Ruben 07/08/2022 0 58     MV stenosis pressure 1/2* 07/08/2022 105     LVOT SI 07/08/2022 50 50     LVSV, 2D 07/08/2022 49     LVOT area 07/08/2022 3 46     AV Velocity Ratio 07/08/2022 0 51     AV valve area 07/08/2022 1 63     MV valve area by continu* 07/08/2022 1 57     POC Glucose 07/08/2022 131        Results from last 7 days   Lab Units 07/07/22  1311 07/07/22  1300   GRAM STAIN RESULT  Gram positive cocci in pairs, chains and clusters* Gram positive cocci in pairs, chains and clusters*       Results from last 7 days   Lab Units 07/07/22  1300   BLOOD CULTURE  Staphylococcus aureus*       Invalid input(s): LABAEARO            Imaging: I have personally reviewed pertinent films in PACS  EKG, Pathology, and Other Studies: I have personally reviewed pertinent reports        Code Status: Level 1 - Full Code  Advance Directive and Living Will: Yes    Power of :    POLST:

## 2022-07-08 NOTE — PROGRESS NOTES
Vancomycin IV Pharmacy-to-Dose Consultation    Agnieszka Mills is a 74 y o  male who is currently receiving Vancomycin IV with management by the Pharmacy Consult service  Assessment/Plan:  The patient was reviewed  Renal function is stable and no signs or symptoms of nephrotoxicity and/or infusion reactions were documented in the chart  Based on todays assessment, continue current vancomycin (day # 2) dosing of 1g q24, with a plan for trough to be drawn at 1430 on 7/10  We will continue to follow the patients culture results and clinical progress daily      Arti Reyes, Pharmacist

## 2022-07-08 NOTE — ED NOTES
Patient returned from dialysis with riggors and low spo2 in 80's on 3L nasal  Placed on NRB at this time  EKG and gluocse obtained  Physician at bedside        Jarod Etienne RN  07/08/22 7544

## 2022-07-08 NOTE — PROGRESS NOTES
[address]  Progress Note Brandon Elders 1955, 74 y o  male MRN: [id number]  Unit/Bed#: ED 21 Encounter: [id number]  Primary Care Provider: Alicja Chacko DO   Date and time admitted to hospital: 7/7/2022 12:44 PM    * Severe sepsis with lactic acidosis [facility])  Assessment & Plan  · Secondary to diabetic foot infection present on admission with fever and leukocytosis  · Lactic acidosis resolved  · Will follow-up bone scan of left foot  · Will continue IV antibiotics with vancomycin  Infectious Disease recommended discontinuing cefepime  · Trend procalcitonin  · Blood cultures positive for gram-positive cocci  · Podiatry consultation    Diabetic infection of left foot ([facility] )  Assessment & Plan  · Will continue antibiotics  · Podiatry consulted  · Follow-up bone scan  · Further treatment as above    Colon cancer [facility])  Assessment & Plan  · Patient follows with Hematology/Oncology  · Last chemo session was on 07/05/2022    Stage 4 chronic kidney disease ([facility] )  Assessment & Plan  · On dialysis  · Nephrology consulted for dialysis  · 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, sevelamer    Type 2 diabetes mellitus with chronic kidney disease, with long-term current use of insulin ([facility])  Assessment & Plan  · Will monitor fingersticks  · Insulin sliding scale  · Lantus 10 units at bedtime  · Diabetic diet  · Will adjust diabetic regimen as needed    Persistent atrial fibrillation ([facility] )  Assessment & Plan  · Will continue Lopressor  · Eliquis held in case patient requires surgical intervention  If no plan for surgery will resume Eliquis      VTE Prophylaxis:  Eliquis held for possible need of surgery    Patient Centered Rounds: I have performed bedside rounds with nursing staff today      Discussions with Specialists or Other Care Team Provider: yes  Education and Discussions with Family / Patient:  Updated patient regarding plan of care    Current Length of Stay: 1 day(s)    Current Patient Status: Inpatient Certification Statement: The patient will continue to require additional inpatient hospital stay due to Bacteremia    Discharge Plan:  Pending hospital course    Code Status: Level 1 - Full Code    Subjective:   Blood cultures positive for gram-positive cocci  Patient with fevers  Pain currently controlled  Tolerating diet    Objective:     Vitals:   Temp (24hrs), Av 4 °F (38 °C), Min:98 6 °F (37 °C), Max:103 3 °F (39 6 °C)    Temp:  [98 6 °F (37 °C)-103 3 °F (39 6 °C)] 98 9 °F (37 2 °C)  HR:  [60-92] 60  Resp:  [18-20] 20  BP: (103-143)/(55-89) 111/56  SpO2:  [88 %-99 %] 93 %  Body mass index is 28 41 kg/m²  Input and Output Summary (last 24 hours): Intake/Output Summary (Last 24 hours) at 2022 1124  Last data filed at 2022 1715  Gross per 24 hour   Intake 2945 ml   Output --   Net 2945 ml       Physical Exam:   Physical Exam  Constitutional:       General: He is not in acute distress  HENT:      Head: Normocephalic and atraumatic  Nose: Nose normal       Mouth/Throat:      Mouth: Mucous membranes are moist    Eyes:      Extraocular Movements: Extraocular movements intact  Conjunctiva/sclera: Conjunctivae normal    Cardiovascular:      Rate and Rhythm: Normal rate and regular rhythm  Pulmonary:      Effort: Pulmonary effort is normal  No respiratory distress  Abdominal:      Palpations: Abdomen is soft  Tenderness: There is no abdominal tenderness  Musculoskeletal:         General: Normal range of motion  Cervical back: Normal range of motion and neck supple  Skin:     General: Skin is warm and dry  Comments: Left foot erythema with ulcer noted on base of foot   Neurological:      General: No focal deficit present  Mental Status: He is alert  Mental status is at baseline  Cranial Nerves: No cranial nerve deficit     Psychiatric:         Mood and Affect: Mood normal          Behavior: Behavior normal          Additional Data:     Labs:    Results from last 7 days   Lab Units 07/08/22  0447 07/07/22  1300   WBC Thousand/uL 16 91* 16 54*   HEMOGLOBIN g/dL 8 2* 9 1*   HEMATOCRIT % 25 4* 27 5*   PLATELETS Thousands/uL 172 215   NEUTROS PCT %  --  89*   LYMPHS PCT %  --  4*   LYMPHO PCT % 2*  --    MONOS PCT %  --  6   MONO PCT % 5  --    EOS PCT % 0 0     Results from last 7 days   Lab Units 07/08/22  0447 07/07/22  1300   SODIUM mmol/L 129* 130*   POTASSIUM mmol/L 4 3 4 8   CHLORIDE mmol/L 95* 90*   CO2 mmol/L 25 28   BUN mg/dL 45* 37*   CREATININE mg/dL 4 17* 3 53*   CALCIUM mg/dL 8 4 8 9   ALK PHOS U/L  --  93   ALT U/L  --  10   AST U/L  --  9*     Results from last 7 days   Lab Units 07/07/22  1300   INR  2 15*     Results from last 7 days   Lab Units 07/08/22  0622 07/07/22  2117 07/07/22  1852   POC GLUCOSE mg/dl 131 177* 232*           * I Have Reviewed All Lab Data Listed Above  * Additional Pertinent Lab Tests Reviewed:  Jess [facility] Admission  Reviewed    Imaging:  Imaging Reports Reviewed Today Include:  No new imaging    Recent Cultures (last 7 days):     Results from last 7 days   Lab Units 07/07/22  1311 07/07/22  1300   GRAM STAIN RESULT  Gram positive cocci in pairs, chains and clusters* Gram positive cocci in pairs, chains and clusters*       Last 24 Hours Medication List:   Current Facility-Administered Medications   Medication Dose Route Frequency Provider Last Rate    acetaminophen  650 mg Oral Q6H PRN Sal Thacker PA-C      amLODIPine  10 mg Oral Daily Edita Farrell MD      insulin glargine  10 Units Subcutaneous HS Edita Farrell MD      insulin lispro  1-6 Units Subcutaneous TID [facility] Edita Farrell MD      metoprolol tartrate  50 mg Oral Q12H Edita Farrell MD      midodrine  5 mg Oral Once per day on Mon Wed Fri Edita Farrell MD      sevelamer  800 mg Oral TID With Julissa Yang MD      sodium bicarbonate  650 mg Oral BID after meals [place] Barbie Owen MD      tamsulosin  0 4 mg Oral Daily With Angel Shaw MD      torsemide  80 mg Oral Once per day on Sun Tue Thu Sat Brandie Montague MD      vancomycin  10 mg/kg Intravenous Q24H Brandie Montague MD       Facility-Administered Medications Ordered in Other Encounters   Medication Dose Route Frequency Provider Last Rate    [MAR Hold] dextrose  20 mL/hr Intravenous Once Pete Silvestre MD     [facility] Hold] sodium chloride  20 mL/hr Intravenous Once PRN Pete Silvestre MD          Today, Patient Was Seen By: Brandie Montague MD    ** Please Note: Dictation voice to text software may have been used in the creation of this document   **

## 2022-07-08 NOTE — CONSULTS
Consultation - Infectious Disease   Jerry Barron 74 y o  male MRN: [id number]  Unit/Bed#: ED 21 Encounter: [id number]      Inpatient consult to Infectious Diseases  Consult performed by: Verner Blackwater, MD  Consult ordered by: Gianluca Danielle MD            REQUIRED DOCUMENTATION:     1  This service was provided via Telemedicine  2  Provider located at [facility]  3  TeleMed provider: Verner Blackwater, MD   4  Identify all parties in room with patient during tele consult:RN  5  After connecting through [email or web address], patient was identified by name and date of birth and assistant checked wristband  Patient was then informed that this was a Telemedicine visit and that the exam was being conducted confidentially over secure lines  My office door was closed  No one else was in the room  Patient acknowledged consent and understanding of privacy and security of the Telemedicine visit, and gave us permission to have the assistant stay in the room in order to assist with the history and to conduct the exam   I informed the patient that I have reviewed their record in Epic and presented the opportunity for them to ask any questions regarding the visit today  The patient agreed to participate  Assessment/Recommendations     1  Sepsis, present on admission  - Source of sepsis is bacteremia  - Clinically improving, afebrile without leukocytosis    · Management as below    2  Gram positive bacteremia  - Source of bacteremia is left foot cellulitis  - Both sets from admission with gram positive cocci in pairs, chains and clusters, awaiting identification of species  - Has underlying pacemaker, left shoulder hardware, port a cath and R chest dialysis catheter  - Clinically improved    · Continue current antibiotic therapy with Vanc  · Discontinue cefepime  · FU bld c/s  · Check repeat blood cultures and TTE  · Final choice and duration of antibiotics to be determined    3   Left foot cellulitis  - in the setting of chronic left toe wounds  - likely source of bacteremia and sepsis  - Low suspicion for abscess, osteo clinically  - improving    · Antibiotics as above  · Consult podiatry and wound care  · Serial extremity exams    4  Type 2 diabetes  - Risk factor for infection, poor wound healing    · Recommend strict glycemic control to aid with wound healing    5  Sigmoid cancer  - Last cycle was 7/5 via R chest port-a-cath  - Risk factor for immune suppression    · Management per Heme-Onc    6  ESRD on dialysis  - Via R permacath with no local signs of infection    Management per primary team    Thank you for involving me in the care of your patient  Please call with questions, change in clinical status or if tests recommended above are abnormal      Discussed with the primary service  History     Reason for Consult:  Diabetic foot infection  HPI: Jennifer Arango is a 74y o  year old male with type 2 diabetes, end stage renal disease on dialysis, CHF, recently diagnosed sigmoid colon cancer, last chemo cycle was on 07/05  He has a pacemaker and left shoulder hardware  He has chronic wounds on his left 2nd and 3rd toes  The patient presented to the ER on 7/7 with few days of fever, left foot swelling, redness and mild confusion  In the ER, vitals were notable for a fever to 103   Labs were notable for lactic acidosis with leukocytosis and elevated creatinine  EKG noted no acute ST changes  CXR showed no infiltrate and XR foot showed no focus of infection  Patient was admitted on Vanc, cefepime  Bld cx from admission are growing GPC in pairs, chains and clusters  The patient currently has no complaints  Denies fevers, chills, or sweats  Denies nausea, vomiting, or diarrhea  Infectious disease is being consulted for diagnostic work up and antibiotic management  Review of Systems  Pertinent positives and negatives as noted in HPI   Rest complete 12 point system-based review of systems is otherwise negative      PAST MEDICAL HISTORY:  Past Medical History:   Diagnosis Date    Anemia     iron def    Arthritis     OA    Bruise of both arms     forearms and both hands    Bruises easily     [facility] 09/01/2021    colon    CHF (congestive heart failure) ([facility])     Chronic kidney disease     CPAP (continuous positive airway pressure) dependence     Diabetes [facility]     Diabetic foot ulcer [facility]     Eczema     Erectile dysfunction     Gout     Heart murmur     History of permanent cardiac pacemaker placement 11/2018    History of transfusion     Hyperlipidemia     Hypertension     Hypoglycemia 03/08/2019    Murmur     Nephropathy     Osteoarthritis     Pacemaker 11/06/2018    PAD (peripheral artery disease) ([facility])     Seasonal allergies     Sleep apnea     Toe infection     bilat great toes    Vertigo     Walks frequently     Wears dentures     upper    Wears glasses      Past Surgical History:   Procedure Laterality Date    CARDIAC PACEMAKER PLACEMENT      CARPAL TUNNEL RELEASE Left     CARPAL TUNNEL RELEASE Right     CARPAL TUNNEL RELEASE      COLONOSCOPY  08/2020    COLONOSCOPY      FL GUIDED CENTRAL VENOUS ACCESS DEVICE INSERTION  01/11/2022    FOOT AMPUTATION Left 02/10/2020    Procedure: PARTIAL 1ST RAY AMPUTATION;  Surgeon: Sherry Devine DPM;  [facility];  Service: Podiatry    INCISION AND DRAINAGE OF WOUND Left 01/12/2020    Procedure: INCISION AND DRAINAGE (I&D) EXTREMITY AND REMOVAL OF SESMOID BONE;  Surgeon: Keyona Camacho DPM;  [facility];  Service: Podiatry    IR OTHER  01/21/2022    IR PICC REPOSITION  01/14/2020    IR TUNNELED DIALYSIS CATHETER PLACEMENT  04/26/2022    KNEE ARTHROSCOPY Left     KNEE ARTHROSCOPY Right     KNEE SURGERY      IL AMPUTATION TOE,I-P JT Bilateral 05/02/2017    Procedure: PARTIAL AMPUTATION RIGHT AND LEFT HALLUX ;  Surgeon: Sherry Devine DPM;  [facility];  Service: Podiatry    IL AMPUTATION TOE,MT-P JT Left 2017    Procedure: 2ND TOE AMPUTATION;  Surgeon: Nyla Herndon DPM;  [facility];  Service: [address] Left 2022    Procedure: CREATION FISTULA ARTERIOVENOUS (AV) RADIOCEPHALIC;  Surgeon: Richard Mariscal MD;  [facility];  Service: Vascular    ND INSJ TUNNELED CTR VAD W/SUBQ PORT AGE 5 YR/> N/A 2022    Procedure: INSERTION VENOUS PORT ( PORT-A-CATH) IR;  Surgeon: Sabiha Martinez DO;  [facility];  Service: Interventional Radiology    RESECTION LOW ANTERIOR LAPAROSCOPIC N/A 10/21/2021    Procedure: RESECTION LOW ANTERIOR LAPAROSCOPIC;  Surgeon: Alla Nava MD;  [facility];  Service: Colorectal    SHOULDER ARTHROSCOPY Left     with screws,RTC    SHOULDER SURGERY Left     rotator cuff    TOE AMPUTATION Left 2020    Procedure: Vesta Cleve;  Surgeon: Araceli Rasheed DPM;  [facility];  Service: Podiatry    UPPER GASTROINTESTINAL ENDOSCOPY  2020    Intestinal metaplasia prepyloric    VASECTOMY         FAMILY HISTORY:  Non-contributory    SOCIAL HISTORY:  Social History   /Civil Union  Social History     Substance and Sexual Activity   Alcohol Use Never    Alcohol/week: 0 0 standard drinks     Social History     Substance and Sexual Activity   Drug Use No     Social History     Tobacco Use   Smoking Status Former Smoker    Packs/day: 0 50    Years: 20 00    Pack years: 10 00    Types: Cigarettes    Start date: 1    Quit date:     Years since quittin 5   Smokeless Tobacco Never Used   Tobacco Comment    quit 10 years ago       ALLERGIES:  Allergies   Allergen Reactions    Invokana [Canagliflozin] Other (See Comments)     Caused circulation problems    Lamisil [Terbinafine] Blisters     Wife states " His skin peeled from head to toe"    Other Swelling     Pomegranate - facial swelling, no swelling of tongue, esophagus  Adhesive tape        Latex Rash MEDICATIONS:  All current active medications have been reviewed  Physical Exam     Temp:  [98 6 °F (37 °C)-103 3 °F (39 6 °C)] 98 9 °F (37 2 °C)  HR:  [60-92] 60  Resp:  [18-20] 20  BP: (103-143)/(55-89) 111/56  SpO2:  [88 %-99 %] 93 %  Temp (24hrs), Av 7 °F (38 2 °C), Min:98 6 °F (37 °C), Max:103 3 °F (39 6 °C)  Current: Temperature: 98 9 °F (37 2 °C)    Intake/Output Summary (Last 24 hours) at 2022 0920  Last data filed at 2022 1715  Gross per 24 hour   Intake 2945 ml   Output --   Net 2945 ml         Physical exam findings reported by bedside and primary medical team staff    General Appearance:  Appearing ill, nontoxic, and in no distress, appears stated age   Head:  Normocephalic, without obvious abnormality, atraumatic   Eyes:  PERRL, conjunctiva pink and sclera anicteric, both eyes   Nose: Nares normal, mucosa normal, no drainage   Throat: Oropharynx moist without lesions; lips, mucosa, and tongue normal; teeth and gums normal   Neck: Supple, symmetrical, trachea midline, no adenopathy, no tenderness/mass/nodules   Back:   Symmetric, no curvature, ROM normal, no CVA tenderness   Lungs:   Diminished bilaterally, no audible wheezes, rhonchi and rales, respirations unlabored   Chest Wall:  No tenderness or deformity   Pacemaker, port and permacath site c/d/i   Heart:  Regular rate and rhythm, S1, S2 normal, no murmur, rub or gallop   Abdomen:   Soft, non-tender, non-distended, positive bowel sounds, no masses, no organomegaly    No CVA tenderness   Extremities: L>R edema over foot   Skin: Left foot diffuse erythema, superficial wounds over L 2nd and 3rd toes, s/p partial 2nd toe amputation and first toe amputation   Lymph nodes: Cervical, supraclavicular, and axillary nodes normal   Neurologic: Alert and oriented times 3       Invasive Devices:   Port A Cath Right Subclavian (Active)       Peripheral IV 22 Distal;Right;Ventral (anterior) Forearm (Active)       Hemodialysis AV Fistula 06/01/22 Left Forearm (Active)       HD Permanent Double Catheter (Active)       Labs, Imaging, & Other Studies     Lab Results:    I have personally reviewed pertinent labs  Results from last 7 days   Lab Units 07/08/22  0447 07/07/22  1300 07/02/22  0927   WBC Thousand/uL 16 91* 16 54* 21 98*   HEMOGLOBIN g/dL 8 2* 9 1* 10 3*   PLATELETS Thousands/uL 172 215 209     Results from last 7 days   Lab Units 07/08/22  0447 07/07/22  1300 07/02/22  0927   POTASSIUM mmol/L 4 3 4 8 4 6   CHLORIDE mmol/L 95* 90* 100   CO2 mmol/L 25 28 28   BUN mg/dL 45* 37* 39*   CREATININE mg/dL 4 17* 3 53* 3 15*   EGFR ml/min/1 73sq m 13 16 19   CALCIUM mg/dL 8 4 8 9 9 0   AST U/L  --  9* 17   ALT U/L  --  10 25   ALK PHOS U/L  --  93 109     Results from last 7 days   Lab Units 07/07/22  1311 07/07/22  1300   GRAM STAIN RESULT  Gram positive cocci in pairs, chains and clusters* Gram positive cocci in pairs, chains and clusters*       Imaging Studies:   I have personally reviewed pertinent imaging study reports and images in PACS  EKG, Pathology, and Other Studies:   I have personally reviewed pertinent reports  Counseling/Coordination of care: Total 70 minutes communication with the patient via telehealth  Labs, medical tests and imaging studies were independently and extensively reviewed by me as noted above in HPI and old records were obtained and summarized as noted above in HPI  My recommendations were discussed with the patient in detail who verbalized understanding

## 2022-07-08 NOTE — ASSESSMENT & PLAN NOTE
· Will continue Lopressor  · Eliquis held in case patient requires surgical intervention    If no plan for surgery will resume Eliquis

## 2022-07-08 NOTE — PROGRESS NOTES
Vancomycin Assessment    Jose Leyva is a 74 y o  male who is currently receiving vancomycin 1g q24 for skin-soft tissue infection     Relevant clinical data and objective history reviewed:  Creatinine   Date Value Ref Range Status   07/08/2022 4 17 (H) 0 60 - 1 30 mg/dL Final     Comment:     Standardized to IDMS reference method   07/07/2022 3 53 (H) 0 60 - 1 30 mg/dL Final     Comment:     Standardized to IDMS reference method   07/02/2022 3 15 (H) 0 60 - 1 30 mg/dL Final     Comment:     Standardized to IDMS reference method   04/10/2017 1 02 0 70 - 1 25 mg/dL Final     Comment:     Result Comment: For patients >52years of age, the reference limit  for Creatinine is approximately 13% higher for people  identified as -American  /67   Pulse 66   Temp 99 1 °F (37 3 °C) (Oral)   Resp 20   Ht 5' 10" (1 778 m)   Wt 89 8 kg (197 lb 15 6 oz)   SpO2 98%   BMI 28 41 kg/m²   I/O last 3 completed shifts: In: 2945 [IV Piggyback:2945]  Out: -   Lab Results   Component Value Date/Time    BUN 45 (H) 07/08/2022 04:47 AM    BUN 19 04/10/2017 12:00 AM    WBC 16 91 (H) 07/08/2022 04:47 AM    WBC 14 4 (H) 04/10/2017 12:00 AM    WBC NONE SEEN 04/10/2017 12:00 AM    HGB 8 2 (L) 07/08/2022 04:47 AM    HGB 13 2 04/10/2017 12:00 AM    HCT 25 4 (L) 07/08/2022 04:47 AM    HCT 39 7 04/10/2017 12:00 AM     (H) 07/08/2022 04:47 AM    MCV 91 1 04/10/2017 12:00 AM     07/08/2022 04:47 AM     (H) 04/10/2017 12:00 AM     Temp Readings from Last 3 Encounters:   07/08/22 99 1 °F (37 3 °C) (Oral)   07/07/22 (!) 102 3 °F (39 1 °C) (Oral)   07/05/22 97 8 °F (36 6 °C) (Tympanic)     Vancomycin Days of Therapy: 2    Assessment/Plan  The patient is currently on vancomycin utilizing scheduled dosing  Baseline risks associated with therapy include: pre-existing renal impairment    The patient is receiving 1g q24 on HD based on a goal of 15-20 (appropriate for most indications) ; therefore, after clinical evaluation will be changed to 1g after dialysis mon, wed fri   Pharmacy will continue to follow closely for s/sx of nephrotoxicity, infusion reactions, and appropriateness of therapy  BMP and CBC will be ordered per protocol  Plan for pre-HD random level prior to the 3rd  dose at approximately 6am 7/11  Pharmacy will continue to follow the patients culture results and clinical progress daily      Heri Cevallos, Pharmacist

## 2022-07-08 NOTE — PLAN OF CARE
Problem: Potential for Falls  Goal: Patient will remain free of falls  Description: INTERVENTIONS:  - Educate patient/family on patient safety including physical limitations  - Instruct patient to call for assistance with activity   - Consult OT/PT to assist with strengthening/mobility   - Keep Call bell within reach  - Keep bed low and locked with side rails adjusted as appropriate  - Keep care items and personal belongings within reach  - Initiate and maintain comfort rounds  - Make Fall Risk Sign visible to staff  - Apply yellow socks and bracelet for high fall risk patients  - Consider moving patient to room near nurses station  Outcome: Progressing     Problem: MOBILITY - ADULT  Goal: Maintain or return to baseline ADL function  Description: INTERVENTIONS:  -  Assess patient's ability to carry out ADLs; assess patient's baseline for ADL function and identify physical deficits which impact ability to perform ADLs (bathing, care of mouth/teeth, toileting, grooming, dressing, etc )  - Assess/evaluate cause of self-care deficits   - Assess range of motion  - Assess patient's mobility; develop plan if impaired  - Assess patient's need for assistive devices and provide as appropriate  - Encourage maximum independence but intervene and supervise when necessary  - Involve family in performance of ADLs  - Assess for home care needs following discharge   - Consider OT consult to assist with ADL evaluation and planning for discharge  - Provide patient education as appropriate  Outcome: Progressing  Goal: Maintains/Returns to pre admission functional level  Description: INTERVENTIONS:  - Perform BMAT or MOVE assessment daily    - Set and communicate daily mobility goal to care team and patient/family/caregiver     - Collaborate with rehabilitation services on mobility goals if consulted  - Record patient progress and toleration of activity level   Outcome: Progressing     Problem: PAIN - ADULT  Goal: Verbalizes/displays adequate comfort level or baseline comfort level  Description: Interventions:  - Encourage patient to monitor pain and request assistance  - Assess pain using appropriate pain scale  - Administer analgesics based on type and severity of pain and evaluate response  - Implement non-pharmacological measures as appropriate and evaluate response  - Consider cultural and social influences on pain and pain management  - Notify physician/advanced practitioner if interventions unsuccessful or patient reports new pain  Outcome: Progressing     Problem: INFECTION - ADULT  Goal: Absence or prevention of progression during hospitalization  Description: INTERVENTIONS:  - Assess and monitor for signs and symptoms of infection  - Monitor lab/diagnostic results  - Monitor all insertion sites, i e  indwelling lines, tubes, and drains  - Monitor endotracheal if appropriate and nasal secretions for changes in amount and color  - Booker appropriate cooling/warming therapies per order  - Administer medications as ordered  - Instruct and encourage patient and family to use good hand hygiene technique  - Identify and instruct in appropriate isolation precautions for identified infection/condition  Outcome: Progressing  Goal: Absence of fever/infection during neutropenic period  Description: INTERVENTIONS:  - Monitor WBC    Outcome: Progressing     Problem: SAFETY ADULT  Goal: Patient will remain free of falls  Description: INTERVENTIONS:  - Educate patient/family on patient safety including physical limitations  - Instruct patient to call for assistance with activity   - Consult OT/PT to assist with strengthening/mobility   - Keep Call bell within reach  - Keep bed low and locked with side rails adjusted as appropriate  - Keep care items and personal belongings within reach  - Initiate and maintain comfort rounds  - Make Fall Risk Sign visible to staff  - Apply yellow socks and bracelet for high fall risk patients  - Consider moving patient to room near nurses station  Outcome: Progressing  Goal: Maintain or return to baseline ADL function  Description: INTERVENTIONS:  -  Assess patient's ability to carry out ADLs; assess patient's baseline for ADL function and identify physical deficits which impact ability to perform ADLs (bathing, care of mouth/teeth, toileting, grooming, dressing, etc )  - Assess/evaluate cause of self-care deficits   - Assess range of motion  - Assess patient's mobility; develop plan if impaired  - Assess patient's need for assistive devices and provide as appropriate  - Encourage maximum independence but intervene and supervise when necessary  - Involve family in performance of ADLs  - Assess for home care needs following discharge   - Consider OT consult to assist with ADL evaluation and planning for discharge  - Provide patient education as appropriate  Outcome: Progressing  Goal: Maintains/Returns to pre admission functional level  Description: INTERVENTIONS:  - Perform BMAT or MOVE assessment daily    - Set and communicate daily mobility goal to care team and patient/family/caregiver     - Collaborate with rehabilitation services on mobility goals if consulted  - Record patient progress and toleration of activity level   Outcome: Progressing     Problem: DISCHARGE PLANNING  Goal: Discharge to home or other facility with appropriate resources  Description: INTERVENTIONS:  - Identify barriers to discharge w/patient and caregiver  - Arrange for needed discharge resources and transportation as appropriate  - Identify discharge learning needs (meds, wound care, etc )  - Arrange for interpretive services to assist at discharge as needed  - Refer to Case Management Department for coordinating discharge planning if the patient needs post-hospital services based on physician/advanced practitioner order or complex needs related to functional status, cognitive ability, or social support system  Outcome: Progressing     Problem: Knowledge Deficit  Goal: Patient/family/caregiver demonstrates understanding of disease process, treatment plan, medications, and discharge instructions  Description: Complete learning assessment and assess knowledge base  Interventions:  - Provide teaching at level of understanding  - Provide teaching via preferred learning methods  Outcome: Progressing     Problem: Nutrition/Hydration-ADULT  Goal: Nutrient/Hydration intake appropriate for improving, restoring or maintaining nutritional needs  Description: Monitor and assess patient's nutrition/hydration status for malnutrition  Collaborate with interdisciplinary team and initiate plan and interventions as ordered  Monitor patient's weight and dietary intake as ordered or per policy  Utilize nutrition screening tool and intervene as necessary  Determine patient's food preferences and provide high-protein, high-caloric foods as appropriate       INTERVENTIONS:  - Monitor oral intake, urinary output, labs, and treatment plans  - Assess nutrition and hydration status and recommend course of action  - Evaluate amount of meals eaten  - Assist patient with eating if necessary   - Allow adequate time for meals  - Recommend/ encourage appropriate diets, oral nutritional supplements, and vitamin/mineral supplements  - Order, calculate, and assess calorie counts as needed  - Recommend, monitor, and adjust tube feedings and TPN/PPN based on assessed needs  - Assess need for intravenous fluids  - Provide specific nutrition/hydration education as appropriate  - Include patient/family/caregiver in decisions related to nutrition  Outcome: Progressing

## 2022-07-08 NOTE — ASSESSMENT & PLAN NOTE
· Will continue antibiotics  · Podiatry consulted  · Follow-up bone scan  · Further treatment as above

## 2022-07-09 PROBLEM — N18.6 ESRD (END STAGE RENAL DISEASE) (HCC): Status: ACTIVE | Noted: 2022-07-09

## 2022-07-09 PROBLEM — R78.81 GRAM-POSITIVE BACTEREMIA: Status: ACTIVE | Noted: 2022-07-09

## 2022-07-09 PROBLEM — J96.01 ACUTE RESPIRATORY FAILURE WITH HYPOXIA (HCC): Status: ACTIVE | Noted: 2022-01-01

## 2022-07-09 LAB
ANION GAP SERPL CALCULATED.3IONS-SCNC: 13 MMOL/L (ref 4–13)
BASOPHILS # BLD AUTO: 0.02 THOUSANDS/ΜL (ref 0–0.1)
BASOPHILS NFR BLD AUTO: 0 % (ref 0–1)
BUN SERPL-MCNC: 36 MG/DL (ref 5–25)
CALCIUM SERPL-MCNC: 8.8 MG/DL (ref 8.4–10.2)
CHLORIDE SERPL-SCNC: 92 MMOL/L (ref 96–108)
CO2 SERPL-SCNC: 26 MMOL/L (ref 21–32)
CREAT SERPL-MCNC: 3.46 MG/DL (ref 0.6–1.3)
EOSINOPHIL # BLD AUTO: 0.09 THOUSAND/ΜL (ref 0–0.61)
EOSINOPHIL NFR BLD AUTO: 1 % (ref 0–6)
ERYTHROCYTE [DISTWIDTH] IN BLOOD BY AUTOMATED COUNT: 17 % (ref 11.6–15.1)
GFR SERPL CREATININE-BSD FRML MDRD: 17 ML/MIN/1.73SQ M
GLUCOSE SERPL-MCNC: 136 MG/DL (ref 65–140)
GLUCOSE SERPL-MCNC: 138 MG/DL (ref 65–140)
GLUCOSE SERPL-MCNC: 185 MG/DL (ref 65–140)
GLUCOSE SERPL-MCNC: 217 MG/DL (ref 65–140)
GLUCOSE SERPL-MCNC: 238 MG/DL (ref 65–140)
HCT VFR BLD AUTO: 27.5 % (ref 36.5–49.3)
HGB BLD-MCNC: 9 G/DL (ref 12–17)
IMM GRANULOCYTES # BLD AUTO: 0.12 THOUSAND/UL (ref 0–0.2)
IMM GRANULOCYTES NFR BLD AUTO: 1 % (ref 0–2)
LYMPHOCYTES # BLD AUTO: 0.7 THOUSANDS/ΜL (ref 0.6–4.47)
LYMPHOCYTES NFR BLD AUTO: 5 % (ref 14–44)
MCH RBC QN AUTO: 33.5 PG (ref 26.8–34.3)
MCHC RBC AUTO-ENTMCNC: 32.7 G/DL (ref 31.4–37.4)
MCV RBC AUTO: 102 FL (ref 82–98)
MONOCYTES # BLD AUTO: 0.69 THOUSAND/ΜL (ref 0.17–1.22)
MONOCYTES NFR BLD AUTO: 5 % (ref 4–12)
NEUTROPHILS # BLD AUTO: 13.33 THOUSANDS/ΜL (ref 1.85–7.62)
NEUTS SEG NFR BLD AUTO: 88 % (ref 43–75)
NRBC BLD AUTO-RTO: 0 /100 WBCS
PLATELET # BLD AUTO: 165 THOUSANDS/UL (ref 149–390)
PMV BLD AUTO: 9.6 FL (ref 8.9–12.7)
POTASSIUM SERPL-SCNC: 4.5 MMOL/L (ref 3.5–5.3)
RBC # BLD AUTO: 2.69 MILLION/UL (ref 3.88–5.62)
SODIUM SERPL-SCNC: 131 MMOL/L (ref 135–147)
WBC # BLD AUTO: 14.95 THOUSAND/UL (ref 4.31–10.16)

## 2022-07-09 PROCEDURE — 87040 BLOOD CULTURE FOR BACTERIA: CPT | Performed by: INTERNAL MEDICINE

## 2022-07-09 PROCEDURE — 80048 BASIC METABOLIC PNL TOTAL CA: CPT | Performed by: INTERNAL MEDICINE

## 2022-07-09 PROCEDURE — 97163 PT EVAL HIGH COMPLEX 45 MIN: CPT

## 2022-07-09 PROCEDURE — 99497 ADVNCD CARE PLAN 30 MIN: CPT | Performed by: INTERNAL MEDICINE

## 2022-07-09 PROCEDURE — 82948 REAGENT STRIP/BLOOD GLUCOSE: CPT

## 2022-07-09 PROCEDURE — 85025 COMPLETE CBC W/AUTO DIFF WBC: CPT | Performed by: INTERNAL MEDICINE

## 2022-07-09 PROCEDURE — 97166 OT EVAL MOD COMPLEX 45 MIN: CPT

## 2022-07-09 PROCEDURE — 99232 SBSQ HOSP IP/OBS MODERATE 35: CPT | Performed by: INTERNAL MEDICINE

## 2022-07-09 RX ORDER — LOPERAMIDE HYDROCHLORIDE 2 MG/1
2 CAPSULE ORAL EVERY 4 HOURS PRN
Status: DISCONTINUED | OUTPATIENT
Start: 2022-07-09 | End: 2022-07-17 | Stop reason: HOSPADM

## 2022-07-09 RX ADMIN — SEVELAMER HYDROCHLORIDE 800 MG: 800 TABLET, FILM COATED PARENTERAL at 16:30

## 2022-07-09 RX ADMIN — METOPROLOL TARTRATE 50 MG: 50 TABLET, FILM COATED ORAL at 16:30

## 2022-07-09 RX ADMIN — INSULIN LISPRO 1 UNITS: 100 INJECTION, SOLUTION INTRAVENOUS; SUBCUTANEOUS at 11:28

## 2022-07-09 RX ADMIN — METOPROLOL TARTRATE 50 MG: 50 TABLET, FILM COATED ORAL at 05:18

## 2022-07-09 RX ADMIN — TAMSULOSIN HYDROCHLORIDE 0.4 MG: 0.4 CAPSULE ORAL at 16:30

## 2022-07-09 RX ADMIN — TORSEMIDE 80 MG: 20 TABLET ORAL at 07:17

## 2022-07-09 RX ADMIN — SEVELAMER HYDROCHLORIDE 800 MG: 800 TABLET, FILM COATED PARENTERAL at 07:17

## 2022-07-09 RX ADMIN — LOPERAMIDE HYDROCHLORIDE 2 MG: 2 CAPSULE ORAL at 14:15

## 2022-07-09 RX ADMIN — SEVELAMER HYDROCHLORIDE 800 MG: 800 TABLET, FILM COATED PARENTERAL at 11:28

## 2022-07-09 RX ADMIN — AMLODIPINE BESYLATE 10 MG: 10 TABLET ORAL at 21:10

## 2022-07-09 RX ADMIN — APIXABAN 5 MG: 5 TABLET, FILM COATED ORAL at 16:29

## 2022-07-09 RX ADMIN — INSULIN LISPRO 3 UNITS: 100 INJECTION, SOLUTION INTRAVENOUS; SUBCUTANEOUS at 16:31

## 2022-07-09 RX ADMIN — APIXABAN 5 MG: 5 TABLET, FILM COATED ORAL at 07:17

## 2022-07-09 RX ADMIN — INSULIN GLARGINE 10 UNITS: 100 INJECTION, SOLUTION SUBCUTANEOUS at 21:07

## 2022-07-09 NOTE — ASSESSMENT & PLAN NOTE
Lab Results   Component Value Date    EGFR 17 07/09/2022    EGFR 13 07/08/2022    EGFR 16 07/07/2022    CREATININE 3 46 (H) 07/09/2022    CREATININE 4 17 (H) 07/08/2022    CREATININE 3 53 (H) 07/07/2022   · ESRD on HD Monday/Wednesday/Friday  · Nephrology following ; appreciate recommendations

## 2022-07-09 NOTE — ACP (ADVANCE CARE PLANNING)
Serious Illness Conversation    1  What is your understanding now of where you are with your illness? Prognostic Understanding: appropriate understanding of prognosis     2  How much information about what is likely to be ahead with your illness would you like to have? Information: patient wants to be fully informed     3  What did you (clinician) communicate to the patient? Prognostic Communication: Function - I hope that this is not the case, but Im worried that this may be as strong as you will feel, and things are likely to get more difficult  4  If your health situation worsens, what are your most important goals? Goals: be at home, be physically comfortable     5  What are the biggest fears and worries about the future and your health? Fears/Worries: being an emotional burden, being a financial burden, being a physical burden, pain, preparing for death     6  What abilities are so critical to your life that you cannot imagine living without them? Unacceptable Function: not being able to care for myself, including toileting and feeding     7  What gives you strength as you think about the future with your illness? 8  If you become sicker, how much are you willing to go through for the possibility of gaining more time? Be in the hospital: No Have a feeding tube: No   Be in the ICU: No Live in a nursing home: No   Be on a ventilator: No Be uncomfortable: No   Be on dialysis: Yes Undergo aggressive test and/or procedures: No      9  How much does your proxy and family know about your priorities and wishes? Discussion Discussion: wants clinician to talk with family     Henry heard you say that being comfortable is really important to you  Keeping that in mind, and what we know about your illness, I recommend that we consider palliative care consultation in the outpatient setting  This will help us make sure that your treatment plans reflect whats important to you       How does this plan sound to you? I will do everything I can to help you through this    Patient verbalized understanding of the plan     I have spent 45 minutes speaking with my patient on advanced care planning today or during this visit     Advanced directives  Five Wishes: Patient does not have Five Wishes- would not like information

## 2022-07-09 NOTE — PROGRESS NOTES
Vancomycin IV Pharmacy-to-Dose Consultation    Grace Lyon is a 74 y o  male who is currently receiving Vancomycin IV with management by the Pharmacy Consult service  Assessment/Plan:  The patient was reviewed  Renal function is stable and no signs or symptoms of nephrotoxicity and/or infusion reactions were documented in the chart  Based on todays assessment, continue current vancomycin (day # 3) dosing of 1g after dialysis mon, wed, fri, with a plan for trough to be drawn at 7/11 on 6am     We will continue to follow the patients culture results and clinical progress daily      Kimberlee Martin, Pharmacist

## 2022-07-09 NOTE — PHYSICAL THERAPY NOTE
Physical Therapy Evaluation   Time in: 1032  Time out: 1048  Total evaluation time: 16 minutes    Patient's Name: Rohit Kaufman    Admitting Diagnosis  Cellulitis of left foot [L03 116]  Severe sepsis ([facility] ) [A41 9, R65 20]  Stage 4 chronic kidney disease ([facility] ) [N18 4]    Problem List  Patient Active Problem List   Diagnosis    Bilateral leg edema    Diabetic ulcer of toe of left foot associated with type 2 diabetes mellitus ([facility] )    Easy bruising    Eczema    Erectile dysfunction of non-organic origin    Hyperlipidemia    Uremic acidosis    Arthritis    Peripheral arterial disease (HCC)    PVCs (premature ventricular contractions)    Rhinitis    Systolic murmur    Vertigo    Complete heart block (HCC)    Metabolic acidosis    ELZBIETA (acute kidney injury) ([place] [address] )    Other hyperlipidemia    Persistent atrial fibrillation ([facility])    Persistent proteinuria    Volume overload    Urinary retention    NICOLASA (obstructive sleep apnea)    Type 2 diabetes mellitus with chronic kidney disease, with long-term current use of insulin ([facility])    Hypertension    Stage 4 chronic kidney disease (HCC)    Nonrheumatic aortic valve stenosis    Anemia    Fever    Mitral stenosis    Abnormal CT of the chest    Hyperkalemia    Acute pulmonary edema (HCC)    Chronic diastolic (congestive) heart failure (HCC)    Left renal artery stenosis (HCC)    S/P amputation of lesser toe, left (HCC)    S/P amputation of lesser toe, right (HCC)    Elevated troponin I level    Staphylococcus aureus bacteremia    Type 2 diabetes mellitus with diabetic neuropathy, without long-term current use of insulin (HCC)    Hyponatremia    Iron deficiency anemia    Anxiety    Osteomyelitis of left foot (HCC)    Amputation of left great toe (HCC)    Multiple drug resistant organism (MDRO) culture positive    Renovascular hypertension    SSS (sick sinus syndrome) (HCC)    Chronic obstructive pulmonary disease (HCC)    Absence of toe (HCC)    Chronic painful diabetic neuropathy ([place] [address] )    Onychomycosis    Colon cancer ([place] [address] )    Acute kidney injury superimposed on chronic kidney disease ([place] [address] )    RSV (respiratory syncytial virus pneumonia)    Chemotherapy-induced neutropenia (HCC)    Chemotherapy-induced nausea    Acute on chronic diastolic congestive heart failure (HCC)    Other iron deficiency anemias    Pulmonary hypertension (HCC)    S/P arteriovenous (AV) fistula creation    Severe sepsis with lactic acidosis (HCC)    Diabetic infection of left foot ([facility])    Gram-positive bacteremia    Acute respiratory failure with hypoxia (HCC)    ESRD (end stage renal disease) ([place] [address] )       Past Medical History  Past Medical History:   Diagnosis Date    Anemia     iron def    Arthritis     OA    Bruise of both arms     forearms and both hands    Bruises easily     Cancer ([place] [address] ) 09/01/2021    colon    CHF (congestive heart failure) ([facility])     Chronic kidney disease     CPAP (continuous positive airway pressure) dependence     Diabetes mellitus ([place] [address] )     Diabetic foot ulcer ([facility])     Eczema     Erectile dysfunction     Gout     Heart murmur     History of permanent cardiac pacemaker placement 11/2018    History of transfusion     Hyperlipidemia     Hypertension     Hypoglycemia 03/08/2019    Murmur     Nephropathy     Osteoarthritis     Pacemaker 11/06/2018    PAD (peripheral artery disease) ([facility])     Seasonal allergies     Sleep apnea     Toe infection     bilat great toes    Vertigo     Walks frequently     Wears dentures     upper    Wears glasses        Past Surgical History  Past Surgical History:   Procedure Laterality Date    CARDIAC PACEMAKER PLACEMENT      CARPAL TUNNEL RELEASE Left     CARPAL TUNNEL RELEASE Right     CARPAL TUNNEL RELEASE      COLONOSCOPY  08/2020    COLONOSCOPY      FL GUIDED CENTRAL VENOUS ACCESS DEVICE INSERTION  01/11/2022    FOOT AMPUTATION Left 02/10/2020    Procedure: PARTIAL 1ST RAY AMPUTATION;  Surgeon: Emma Lyles DPM;  [facility];  Service: Podiatry    INCISION AND DRAINAGE OF WOUND Left 01/12/2020    Procedure: INCISION AND DRAINAGE (I&D) EXTREMITY AND REMOVAL OF SESMOID BONE;  Surgeon: Godwin Lomas DPM;  [facility];  Service: Podiatry    IR OTHER  01/21/2022    IR PICC REPOSITION  01/14/2020    IR TUNNELED DIALYSIS CATHETER PLACEMENT  04/26/2022    KNEE ARTHROSCOPY Left     KNEE ARTHROSCOPY Right     KNEE SURGERY      IL AMPUTATION TOE,I-P JT Bilateral 05/02/2017    Procedure: PARTIAL AMPUTATION RIGHT AND LEFT HALLUX ;  Surgeon: Fermín Mullen DPM;  [facility];  Service: Podiatry    IL AMPUTATION TOE,MT-P JT Left 07/25/2017    Procedure: 2ND TOE AMPUTATION;  Surgeon: Fermín Mullen DPM;  [facility];  Service: Podiatry    IL ANASTOMOSIS,AV,ANY SITE Left 6/1/2022    Procedure: CREATION FISTULA ARTERIOVENOUS (AV) RADIOCEPHALIC;  Surgeon: Tavia Juarez MD;  [facility];  Service: Vascular    IL INSJ TUNNELED CTR VAD W/SUBQ PORT AGE 5 YR/> N/A 01/11/2022    Procedure: INSERTION VENOUS PORT ( PORT-A-CATH) IR;  Surgeon: Mustapha Muir DO;  [facility];  Service: Interventional Radiology    RESECTION LOW ANTERIOR LAPAROSCOPIC N/A 10/21/2021    Procedure: RESECTION LOW ANTERIOR LAPAROSCOPIC;  Surgeon: Bessy Callaway MD;  [facility];  Service: Colorectal    SHOULDER ARTHROSCOPY Left     with screws,RTC    SHOULDER SURGERY Left     rotator cuff    TOE AMPUTATION Left 01/08/2020    Procedure: HALLUX AMPUTATION;  Surgeon: Godwin Lomas DPM;  [facility];  Service: Podiatry    UPPER GASTROINTESTINAL ENDOSCOPY  03/2020    Intestinal metaplasia prepyloric    VASECTOMY         PT performed at least 2 patient identifiers during session: Name and wristband         07/09/22 1048   PT Last Visit   PT Visit Date 07/09/22   Note Type   Note type Evaluation   Pain Assessment   Pain Assessment Tool 0-10   Pain Score No Pain   Restrictions/Precautions   Weight Bearing Precautions Per Order Yes   LLE Weight Bearing Per Order WBAT  (c L Darco shoe)   Other Precautions Fall Risk;Multiple lines;Telemetry;O2;WBS  (2 L O2 via NC)   Home Living   Type of [address] [place] [address] Two level;Bed/bath upstairs;Stairs to enter with rails  (1 LAURA, FOS to 2nd floor)   Bathroom Shower/Tub Tub/shower unit   [employer] Raised   Bathroom Equipment Grab bars in shower; Shower chair   Bathroom Accessibility Accessible   Home Equipment Walker;Cane  (no AD used at baseline)   Prior Function   Level of [place] Independent with ADLs and functional mobility   Lives With Spouse; Family   Receives Help From Family   ADL Assistance Independent   IADLs Independent  (shared responsibilities-cooking, laundry)   Falls in the last 6 months 0   Vocational Retired   Comments Pt drives   General   Family/Caregiver Present No   Cognition   Arousal/Participation Alert   Orientation Level Oriented X4   Memory Within functional limits   Following Commands Follows one step commands without difficulty   Comments pt agreeable to PT session   Subjective   Subjective "I feel ok right now"   RLE Assessment   RLE Assessment WFL  (grossly 4/5 throughout)   LLE Assessment   LLE Assessment WFL  (grossly 4/5 throughout)   Vision-Basic Assessment   Current Vision Wears glasses all the time   Coordination   Movements are Fluid and Coordinated 1   Bed Mobility   Supine to Sit 4  Minimal assistance   Additional items Assist x 1;HOB elevated; Increased time required   Transfers   Sit to Stand 5  Supervision   Additional items Assist x 1; Increased time required   Stand to Sit 5  Supervision   Additional items Assist x 1; Armrests; Increased time required   Toilet transfer 5  Supervision   Additional items Assist x 1;Standard toilet; Increased time required   Additional Comments pt denies lightheadedness/dizziness, however states having increased SOB/HOROWITZ following toileting tasks, requires immediate re-donning of 2 L O2 once seated in recliner   Ambulation/Elevation   Gait pattern Improper Weight shift; Shuffling; Short stride   Gait Assistance 5  Supervision   Additional items Assist x 1   Assistive Device None   Distance 12 ft, 20 ft; pt refusing to trial further distances d/t fatigue and weakness   Stair Management Assistance Not tested   Balance   Static Sitting Good   Dynamic Sitting Fair +   Static Standing Fair +   Dynamic Standing Fair   Ambulatory Fair   Endurance Deficit   Endurance Deficit Yes   Activity Tolerance   Activity Tolerance Patient limited by fatigue   Medical Staff Made Aware coordination of care provided with OT Yaneth 812 Yes, RN Esther Uribe made aware of outcomes/recs   Assessment   Prognosis Good   Problem List Decreased strength;Decreased endurance; Impaired balance;Decreased mobility  (activity intolerance)   Assessment Pt is 74 y o  male seen for high-complexity PT evaluation on 7/9/2022 s/p admit to [facility] on 7/7/2022 w/ Severe sepsis with lactic acidosis ([facility] )  PT was consulted to assess pt's functional mobility and d/c needs  Order placed for PT eval and tx, w/ up w/ A order  PTA, pt lives c wife in 2 SH c 1 LAURA, amb s AD at baseline, retired, (I) ADLs  At time of eval, pt performs all OOB mobility tasks at SUP level  Upon evaluation, pt presenting with impaired functional mobility d/t decreased strength, decreased endurance, impaired balance, decreased mobility and activity intolerance  Pertinent PMHx and current co-morbidities affecting pt's physical performance at time of assessment include: CKD stage 4, colon cancer, ESRD M/W/F, AFib, DM type 2, diabetic infection of L foot, gram-positive bacteremia, acute respiratory failure c hypoxia  Personal factors affecting pt at time of eval include: unable to perform physical activity  The following objective measures performed on IE also reveal limitations: AM-PAC 6-Clicks: 74/93   Pt's clinical presentation is currently unstable/unpredictable seen in pt's presentation of ongoing medical assessment, on telemetry monitoring and supplemental O2 requirement to maintain O2 sats > 90%  Overall, pt's rehab potential and prognosis to return to PLOF is good as impacted by objective findings, warranting pt to receive further skilled PT interventions to address identified impairments, activity limitation(s), and participation restriction(s)  Goal for patient is to go home  Pt to benefit from continued PT tx to address deficits as defined above and maximize level of functional independent mobility and consistency in order for pt to improve mobility tolerance  From PT/mobility standpoint, recommendation at time of d/c would be home with outpatient rehabilitation pending progress in order to facilitate return to PLOF  Barriers to Discharge None   Goals   Patient Goals "to get home"   [facility] Expiration Date 07/19/22   Short Term Goal #1 1 )Patient will complete bed mobility supervision of 1 for decrease need for caregiver assistance, decrease burden of care  2 ) Patient will complete transfers supervision of 1 to decrease risk of falls, facilitate upright standing posture  3 ) BLE strength to greater than/equal to 4/5 gross musculature to increase ability to safely transfer, control descent to chair  4 ) Patient will exhibit increase dynamic standing to Good 3-4 minutes  without LOB supervision of 1 to improve activity tolerance  5 ) Patient will exhibit increase dynamic ambulatory balance to Fair   feet w/AD prn supervision of 1 to improve ability to mobilize to toilet, chair and decrease risk for additional medical complications  6 ) Patient will exhibit good self monitoring and ability to follow 2 step commands to increase complexity of tasks and resume ADL's without LOB  PT Treatment Day 0   Plan   Treatment/Interventions Functional transfer training;LE strengthening/ROM; Therapeutic exercise; Endurance training;Patient/family training;Equipment eval/education; Bed mobility;Gait training;Spoke to nursing;Elevations   PT Frequency 2-3x/wk   Recommendation   PT Discharge Recommendation Home with outpatient rehabilitation   Equipment Recommended   (TBD pending progress)   Additional Comments Pt's raw score on the AM-PAC Basic Mobility inpatient short form is 21, standardized score is 45 55  Patients at this level are likely to benefit from DC to home with no services, however, please refer to therapist recommendation for safe DC planning  AM-PAC Basic Mobility Inpatient   Turning in Bed Without Bedrails 4   Lying on Back to Sitting on Edge of Flat Bed 3   Moving Bed to Chair 4   Standing Up From Chair 4   Walk in Room 3   Climb 3-5 Stairs 3   Basic Mobility Inpatient Raw Score 21   Basic Mobility Standardized Score 45 55   Highest Level Of Mobility   JH-HLM Goal 6: Walk 10 steps or more   JH-HLM Achieved 6: Walk 10 steps or more   End of Consult   Patient Position at End of Consult Bedside chair; All needs within reach       Quyen Stroud, PT, DPT

## 2022-07-09 NOTE — ASSESSMENT & PLAN NOTE
· Requiring 2 L of nasal cannula supplemental O2 this morning  · Possibly secondary to volume overload  · Wean O2 as tolerated  · Nephrology following ; appreciate recommendations regarding possible diuresis  · Goal SpO2 > 90%

## 2022-07-09 NOTE — OCCUPATIONAL THERAPY NOTE
Occupational Therapy Evaluation      Kimi Michaelbrenda    7/9/2022    Patient Active Problem List   Diagnosis    Bilateral leg edema    Diabetic ulcer of toe of left foot associated with type 2 diabetes mellitus (HCC)    Easy bruising    Eczema    Erectile dysfunction of non-organic origin    Hyperlipidemia    Uremic acidosis    Arthritis    Peripheral arterial disease (HCC)    PVCs (premature ventricular contractions)    Rhinitis    Systolic murmur    Vertigo    Complete heart block (HCC)    Metabolic acidosis    ELZBIETA (acute kidney injury) ([address] )    Other hyperlipidemia    Persistent atrial fibrillation (HCC)    Persistent proteinuria    Volume overload    Urinary retention    NICOLASA (obstructive sleep apnea)    Type 2 diabetes mellitus with chronic kidney disease, with long-term current use of insulin (HCC)    Hypertension    Stage 4 chronic kidney disease (HCC)    Nonrheumatic aortic valve stenosis    Anemia    Fever    Mitral stenosis    Abnormal CT of the chest    Hyperkalemia    Acute pulmonary edema (HCC)    Chronic diastolic (congestive) heart failure ([facility])    Left renal artery stenosis (HCC)    S/P amputation of lesser toe, left (HCC)    S/P amputation of lesser toe, right (HCC)    Elevated troponin I level    Staphylococcus aureus bacteremia    Type 2 diabetes mellitus with diabetic neuropathy, without long-term current use of insulin (HCC)    Hyponatremia    Iron deficiency anemia    Anxiety    Osteomyelitis of left foot (HCC)    Amputation of left great toe (HCC)    Multiple drug resistant organism (MDRO) culture positive    Renovascular hypertension    SSS (sick sinus syndrome) (HCC)    Chronic obstructive pulmonary disease (HCC)    Absence of toe (HCC)    Chronic painful diabetic neuropathy ([address] )    Onychomycosis    Colon cancer ([address] )    Acute kidney injury superimposed on chronic kidney disease ([address] )    RSV (respiratory syncytial virus pneumonia)    Chemotherapy-induced neutropenia ([address] ) Chemotherapy-induced nausea    Acute on chronic diastolic congestive heart failure (HCC)    Other iron deficiency anemias    Pulmonary hypertension (HCC)    S/P arteriovenous (AV) fistula creation    Severe sepsis with lactic acidosis (HCC)    Diabetic infection of left foot (HCC)    Gram-positive bacteremia    Acute respiratory failure with hypoxia (HCC)    ESRD (end stage renal disease) ([place] [address] )       Past Medical History:   Diagnosis Date    Anemia     iron def    Arthritis     OA    Bruise of both arms     forearms and both hands    Bruises easily     Cancer ([place] [address] ) 09/01/2021    colon    CHF (congestive heart failure) ([facility])     Chronic kidney disease     CPAP (continuous positive airway pressure) dependence     Diabetes mellitus ([place] [address] )     Diabetic foot ulcer ([facility])     Eczema     Erectile dysfunction     Gout     Heart murmur     History of permanent cardiac pacemaker placement 11/2018    History of transfusion     Hyperlipidemia     Hypertension     Hypoglycemia 03/08/2019    Murmur     Nephropathy     Osteoarthritis     Pacemaker 11/06/2018    PAD (peripheral artery disease) ([facility])     Seasonal allergies     Sleep apnea     Toe infection     bilat great toes    Vertigo     Walks frequently     Wears dentures     upper    Wears glasses        Past Surgical History:   Procedure Laterality Date    CARDIAC PACEMAKER PLACEMENT      CARPAL TUNNEL RELEASE Left     CARPAL TUNNEL RELEASE Right     CARPAL TUNNEL RELEASE      COLONOSCOPY  08/2020    COLONOSCOPY      FL GUIDED CENTRAL VENOUS ACCESS DEVICE INSERTION  01/11/2022    FOOT AMPUTATION Left 02/10/2020    Procedure: PARTIAL 1ST RAY AMPUTATION;  Surgeon: Helder Arreguin DPM;  [facility];  Service: Podiatry    INCISION AND DRAINAGE OF WOUND Left 01/12/2020    Procedure: INCISION AND DRAINAGE (I&D) EXTREMITY AND REMOVAL OF SESMOID BONE;  Surgeon: Dwain Obrien DPM;  [facility];  Service: Podiatry    IR OTHER  01/21/2022    IR PICC REPOSITION  01/14/2020 IR TUNNELED DIALYSIS CATHETER PLACEMENT  04/26/2022    KNEE ARTHROSCOPY Left     KNEE ARTHROSCOPY Right     KNEE SURGERY      ME AMPUTATION TOE,I-P JT Bilateral 05/02/2017    Procedure: PARTIAL AMPUTATION RIGHT AND LEFT HALLUX ;  Surgeon: César Ortega DPM;  [facility];  Service: Podiatry    ME AMPUTATION TOE,MT-P JT Left 07/25/2017    Procedure: 2ND TOE AMPUTATION;  Surgeon: César Ortega DPM;  [facility];  Service: Podiatry    ME ANASTOMOSIS,AV,ANY SITE Left 6/1/2022    Procedure: CREATION FISTULA ARTERIOVENOUS (AV) RADIOCEPHALIC;  Surgeon: Maicol Yates MD;  [facility];  Service: Vascular    ME INSJ TUNNELED CTR VAD W/SUBQ PORT AGE 5 YR/> N/A 01/11/2022    Procedure: INSERTION VENOUS PORT ( PORT-A-CATH) IR;  Surgeon: Sameera Kenny DO;  [facility];  Service: Interventional Radiology    RESECTION LOW ANTERIOR LAPAROSCOPIC N/A 10/21/2021    Procedure: RESECTION LOW ANTERIOR LAPAROSCOPIC;  Surgeon: Demetria Sutherland MD;  [facility];  Service: Colorectal    SHOULDER ARTHROSCOPY Left     with screws,RTC    SHOULDER SURGERY Left     rotator cuff    TOE AMPUTATION Left 01/08/2020    Procedure: Gretchen Kenney;  Surgeon: Chari Pollock DPM;  [facility];  Service: Podiatry    UPPER GASTROINTESTINAL ENDOSCOPY  03/2020    Intestinal metaplasia prepyloric    VASECTOMY          07/09/22 1038   OT Last Visit   OT Visit Date 07/09/22   Note Type   Note type Evaluation   Additional Comments Pt agreeable to OT eval  Upon arrival pt supine in bed  Restrictions/Precautions   Weight Bearing Precautions Per Order Yes   LLE Weight Bearing Per Order WBAT  (c L eda shoe)   Other Precautions Multiple lines;Telemetry; Fall Risk;WBS   Pain Assessment   Pain Assessment Tool 0-10   Pain Score No Pain   Home Living   Type of Home House   Home Layout Two level;Bed/bath upstairs;Stairs to enter with rails  (1 LAURA, FOS to 2nd floor)   Bathroom Shower/Tub Tub/shower unit   [place] Toilet Raised   Bathroom Equipment Grab bars in shower; Shower chair   Bathroom Accessibility Accessible   Home Equipment Walker;Cane  (no AD used at baseline)   Prior Function   Level of Bland Independent with ADLs and functional mobility   Lives With Spouse; Family   Receives Help From Family   ADL Assistance Independent   IADLs Independent  (shared responsibilities-cooking, laundry)   Falls in the last 6 months 0   Vocational Retired   Comments (+) driving   Lifestyle   Autonomy Patient reporting being independent with ADLs/IADLs, ambulatory with no AD and lives with family in a two story house   Reciprocal Relationships Supportive wife   Service to Others Retired   Intrinsic Gratification Enjoys playing with the dog   ADL   Eating Assistance 6  Modified independent   [address] [place] [address] Dr Kathya Bueno  64  5  Myles  64  4  [address]   5  Supervision/Setup   Bed Mobility   Supine to Sit 4  Minimal assistance   Additional items Assist x 1;HOB elevated; Increased time required   Sit to Supine   (DNT: pt seated OOB in recliner at end of session)   Transfers   Sit to Stand 5  Supervision   Additional items Assist x 1; Armrests; Increased time required   Stand to Sit 5  Supervision   Additional items Assist x 1; Armrests; Increased time required   Toilet transfer 5  Supervision   Additional items Assist x 1; Increased time required;Standard toilet   Functional Mobility   Functional Mobility 5  Supervision   Additional Comments Pt ambulated to/from bathroom with no overt LOB  Pt noted to have moderate s/s SOB with mobility   SpO2 decreased c mobility but quickly recovered >90% c sitting rest    Additional items Rolling walker   Balance   Static Sitting Good   Dynamic Sitting Fair +   Static Standing Fair +   Dynamic Standing Fair   Activity Tolerance   Activity Tolerance Patient limited by fatigue   Medical Staff Made Aware PT Velvet   Nurse Made Aware RN Alan WILKINSON Assessment   RUE Assessment WFL  (grossly 4/5 MMT)   LUE Assessment   LUE Assessment WFL  (grossly 4/5 MMT)   Hand Function   Gross Motor Coordination Functional   Fine Motor Coordination Functional   Sensation   Light Touch No apparent deficits   Vision-Basic Assessment   Current Vision Wears glasses all the time   Cognition   Overall Cognitive Status [facility]   Arousal/Participation Alert; Responsive; Cooperative   Attention Within functional limits   Orientation Level Oriented X4   Memory Within functional limits   Following Commands Follows all commands and directions without difficulty   Assessment   Limitation Decreased ADL status; Decreased UE strength;Decreased endurance;Decreased self-care trans;Decreased high-level ADLs   Prognosis Good   Assessment Patient is a 74 y o  male seen for OT evaluation s/p admit to [place] [id number] on 7/7/2022 w/Severe sepsis with lactic acidosis ([address] )  Commorbidities affecting patient's functional performance at time of assessment include: acute respiratory failure c hypoxia, colon cancer, diabetic infection of L foot, ESRD, A-fib, CKD, and DM2  Orders placed for OT evaluation and treatment and up with assistance  Performed at least two patient identifiers during session including name and wristband  Prior to admission, Patient reporting being independent with ADLs/IADLs, ambulatory with no AD and lives with family in a two story house  Personal factors affecting patient at time of initial evaluation include: steps to enter, difficulty performing ADLs and difficulty performing IADLs  Upon evaluation, patient requires supervision assist for UB ADLs, supervision and minimal  assist for LB ADLs, transfers and functional ambulation in room and bathroom with supervision assist, with the use of no AD  Patient is oriented x 4    Occupational performance is affected by the following deficits: decreased muscle strength, dynamic sit/ stand balance deficit with poor standing tolerance time for self care and functional mobility, decreased activity tolerance and delayed righting and equilibrium reactions  Patient to benefit from continued Occupational Therapy treatment while in the hospital to address deficits as defined above and maximize level of functional independence with ADLs and functional mobility  Occupational Performance areas to address include: bathing/ shower, dressing, toilet hygiene, transfer to all surfaces, functional ambulation, medication routine/ management, IADLS: Household maintenance, IADLs: safety procedures and IADLs: meal prep/ clean up  From OT standpoint, recommendation at time of d/c would be Home with outpatient rehabilitation  Goals   Patient Goals to breathe better   Plan   Treatment Interventions ADL retraining;Functional transfer training;UE strengthening/ROM; Endurance training;Patient/family training; Compensatory technique education; Energy conservation   Goal Expiration Date 07/19/22   OT Treatment Day 0   OT Frequency 2-3x/wk   Recommendation   OT Discharge Recommendation Home with outpatient rehabilitation   OT - OK to Discharge Yes  (Once medically cleared)   Additional Comments  At end of eval, pt seated OOB in recliner with all needs met   Additional Comments 2 The patient's raw score on the AM-PAC Daily Activity inpatient short form is 20, standardized score is 42 03, greater than 39 4  Patients at this level are likely to benefit from discharge to home  Please refer to the recommendation of the Occupational Therapist for safe discharge planning     AM-PAC Daily Activity Inpatient   Lower Body Dressing 3   Bathing 3   Toileting 3   Upper Body Dressing 3   Grooming 4   Eating 4   Daily Activity Raw Score 20   Daily Activity Standardized Score (Calc for Raw Score >=11) 42 03   AM-PAC Applied Cognition Inpatient   Following a Speech/Presentation 4   Understanding Ordinary Conversation 4   Taking Medications 4   Remembering Where Things Are Placed or Put Away 4   Remembering List of 4-5 Errands 4   Taking Care of Complicated Tasks 4   Applied Cognition Raw Score 24   Applied Cognition Standardized Score 62 21     GOALS:    *ADL transfers with (I) for inc'd independence with ADLs/purposeful tasks    *UB ADL with (I) for inc'd independence with self cares    *LB ADL with (I) using AE prn for inc'd independence with self cares    *Toileting with (I) for clothing management and hygiene for return to [facility] with personal care    *Increase stand tolerance x 5  m for inc'd tolerance with standing purposeful tasks    *Participate in 10m UE therex to increase overall stamina/activity tolerance for purposeful tasks    *Bed mobility- (I) for inc'd independence to manage own comfort and initiate EOB & OOB purposeful tasks    *Patient will verbalize 3 safety awareness/ principles to prevent falls in the home setting  *Patient will verbalize and demonstrate use of energy conservation/deep breathing techniques and work simplification skills during functional activities with no verbal cues  *Patient will increase OOB/sitting tolerance to 2-4 hours per day to increase participation in self-care and leisure tasks with no s/s of exertion         Julia Smaller, OTD, OTR/L

## 2022-07-09 NOTE — ASSESSMENT & PLAN NOTE
· Source likely left diabetic toe ulcer  · Likely MSSA  · Repeat blood cultures pending  · Continue vancomycin ; will likely deescalate to cefazolin however will await Infectious Disease recommendations  · Follow-up repeat blood cultures

## 2022-07-09 NOTE — PROGRESS NOTES
Progress Note - Nephrology   Grace Lyon 74 y o  male MRN: [id number]  Unit/Bed#: ICU 11-01 Encounter: [id number]    A/P:  1  End-stage renal disease   Continue hemodialysis sessions Monday Wednesday and Friday, last session was yesterday which happen without any specific issues  Patient next session will be on Monday July 11th   2  Secondary hyperparathyroidism, renal   Continue with sevelamer binders, check phosphorus levels from time to time  PTH levels in the outpatient setting  3  Anemia chronic kidney disease   Patient has history of colon cancer, continue to hold Epogen at this time  Transfuse packed red blood cells as indicated  4  Severe sepsis associated with septicemia   Appreciate Infectious Disease recommendations, bacteria this far appears to be methicillin-susceptible Staphylococcus aureus  Continue with antibiotics according to Infectious Disease, follow-up and confirm MSSA  5  Diabetic nephropathy  6   Diabetic foot ulcer    Follow up reason for today's visit:  End-stage renal disease/secondary hyperparathyroidism/anemia chronic kidney disease    Severe sepsis with lactic acidosis (HCC)    Patient Active Problem List   Diagnosis    Bilateral leg edema    Diabetic ulcer of toe of left foot associated with type 2 diabetes mellitus ([address] )    Easy bruising    Eczema    Erectile dysfunction of non-organic origin    Hyperlipidemia    Uremic acidosis    Arthritis    Peripheral arterial disease (HCC)    PVCs (premature ventricular contractions)    Rhinitis    Systolic murmur    Vertigo    Complete heart block (HCC)    Metabolic acidosis    ELZBIETA (acute kidney injury) (HCC)    Other hyperlipidemia    Persistent atrial fibrillation (HCC)    Persistent proteinuria    Volume overload    Urinary retention    NICOLASA (obstructive sleep apnea)    Type 2 diabetes mellitus with chronic kidney disease, with long-term current use of insulin (HCC)    Hypertension    Stage 4 chronic kidney disease ([facility] [address] )    Nonrheumatic aortic valve stenosis    Anemia    Fever    Mitral stenosis    Abnormal CT of the chest    Hyperkalemia    Acute pulmonary edema (HCC)    Chronic diastolic (congestive) heart failure (HCC)    Left renal artery stenosis (HCC)    S/P amputation of lesser toe, left (HCC)    S/P amputation of lesser toe, right (HCC)    Elevated troponin I level    Staphylococcus aureus bacteremia    Type 2 diabetes mellitus with diabetic neuropathy, without long-term current use of insulin (HCC)    Hyponatremia    Iron deficiency anemia    Anxiety    Osteomyelitis of left foot (HCC)    Amputation of left great toe (HCC)    Multiple drug resistant organism (MDRO) culture positive    Renovascular hypertension    SSS (sick sinus syndrome) (HCC)    Chronic obstructive pulmonary disease (HCC)    Absence of toe (HCC)    Chronic painful diabetic neuropathy (HCC)    Onychomycosis    Colon cancer (HCC)    Acute kidney injury superimposed on chronic kidney disease (HCC)    RSV (respiratory syncytial virus pneumonia)    Chemotherapy-induced neutropenia (HCC)    Chemotherapy-induced nausea    Acute on chronic diastolic congestive heart failure (HCC)    Other iron deficiency anemias    Pulmonary hypertension (HCC)    S/P arteriovenous (AV) fistula creation    Severe sepsis with lactic acidosis (HCC)    Diabetic infection of left foot (HCC)    Gram-positive bacteremia    Acute respiratory failure with hypoxia (HCC)    ESRD (end stage renal disease) (HCC)         Subjective:   No acute events overnight, patient is eating and drinking, specific complaints of nausea or vomiting  Objective:     Vitals: Blood pressure 124/58, pulse 60, temperature 98 4 °F (36 9 °C), temperature source Tympanic, resp  rate 19, height 5' 10" (1 778 m), weight 94 7 kg (208 lb 12 4 oz), SpO2 100 %  ,Body mass index is 29 96 kg/m²      Weight (last 2 days)     Date/Time Weight    07/09/22 0600 94 7 (208 78) 07/08/22 2000 95 6 (210 76)    07/08/22 1200 89 8 (197 97)    07/07/22 1237 89 8 (198)            Intake/Output Summary (Last 24 hours) at 7/9/2022 1455  Last data filed at 7/9/2022 1401  Gross per 24 hour   Intake 780 ml   Output 741 ml   Net 39 ml     I/O last 3 completed shifts: In: 700 [I V :500;  Other:200]  Out: 541 [Other:541]    HD Permanent Double Catheter (Active)   Reasons to continue HD Cath Treatment Therapy 07/09/22 1200   Goal for Removal N/A- chronic HD catheter 07/09/22 1200   Line Necessity Reviewed Yes, reviewed with provider 07/09/22 1200   Site Assessment WDL 07/08/22 1300   Proximal Lumen Status Capped 07/09/22 0400   Distal Lumen Status Capped 07/09/22 0400   Dressing Type Other (Comment);Gauze 07/08/22 1300   Dressing Status Clean;Dry 07/08/22 1300   Dressing Intervention Dressing changed 07/08/22 1300   Dressing Change Due 07/15/22 07/08/22 1300       Physical Exam: /58   Pulse 60   Temp 98 4 °F (36 9 °C) (Tympanic)   Resp 19   Ht 5' 10" (1 778 m)   Wt 94 7 kg (208 lb 12 4 oz)   SpO2 100%   BMI 29 96 kg/m²     General Appearance:    Alert, cooperative, no distress, appears stated age   Head:    Normocephalic, without obvious abnormality, atraumatic   Eyes:    Conjunctiva/corneas clear   Ears:    Normal external ears   Nose:   Nares normal, septum midline, mucosa normal, no drainage    or sinus tenderness   Throat:   Lips, mucosa, and tongue normal; teeth and gums normal   Neck:   Supple   Back:     Symmetric, no curvature, ROM normal, no CVA tenderness   Lungs:     Reduced but otherwise clear   Chest wall:    No tenderness or deformity   Heart:    Regular rate and rhythm, S1 and S2 normal, no murmur, rub   or gallop   Abdomen:     Soft, non-tender, bowel sounds active   Extremities:   Extremities normal, atraumatic, no cyanosis, mild bilateral lower extremity edema and trace left upper extremity edema   Skin:   Skin color, texture, turgor normal, no rashes or lesions   Lymph nodes:   Cervical normal   Neurologic:   CNII-XII intact            Lab, Imaging and other studies: I have personally reviewed pertinent labs  CBC:   Lab Results   Component Value Date    WBC 14 95 (H) 07/09/2022    HGB 9 0 (L) 07/09/2022    HCT 27 5 (L) 07/09/2022     (H) 07/09/2022     07/09/2022    MCH 33 5 07/09/2022    MCHC 32 7 07/09/2022    RDW 17 0 (H) 07/09/2022    MPV 9 6 07/09/2022    NRBC 0 07/09/2022     CMP:   Lab Results   Component Value Date    K 4 5 07/09/2022    CL 92 (L) 07/09/2022    CO2 26 07/09/2022    BUN 36 (H) 07/09/2022    CREATININE 3 46 (H) 07/09/2022    CALCIUM 8 8 07/09/2022    EGFR 17 07/09/2022         Results from last 7 days   Lab Units 07/09/22  0542 07/08/22  0447 07/07/22  1300   POTASSIUM mmol/L 4 5 4 3 4 8   CHLORIDE mmol/L 92* 95* 90*   CO2 mmol/L 26 25 28   BUN mg/dL 36* 45* 37*   CREATININE mg/dL 3 46* 4 17* 3 53*   CALCIUM mg/dL 8 8 8 4 8 9   ALK PHOS U/L  --   --  93   ALT U/L  --   --  10   AST U/L  --   --  9*         Phosphorus: No results found for: PHOS  Magnesium: No results found for: MG  Urinalysis: No results found for: COLORU, CLARITYU, SPECGRAV, PHUR, LEUKOCYTESUR, NITRITE, PROTEINUA, GLUCOSEU, KETONESU, BILIRUBINUR, BLOODU  Ionized Calcium: No results found for: CAION  Coagulation: No results found for: PT, INR, APTT  Troponin: No results found for: TROPONINI  ABG: No results found for: PHART, AFB7CNY, PO2ART, NCJ7EXW, A5HJXFXV, BEART, SOURCE  Radiology review:     IMAGING  Procedure: XR chest portable    Result Date: 7/9/2022  Narrative: CHEST INDICATION:   hypoxia  COMPARISON:  Chest radiograph from 7/7/2022 and 4/25/2022, chest CT from 11/24/2021  EXAM PERFORMED/VIEWS:  XR CHEST PORTABLE FINDINGS:  Right port at cavoatrial junction  Right central catheter in upper right atrium  Normal heart size with left subclavian pacemaker lead in right atrium and right ventricle  Moderate pulmonary edema with left base atelectasis    No definite effusion  No pneumothorax  Skeleton normal for age  Suture anchor in left humeral head  Impression: Moderate pulmonary edema with left base atelectasis  Workstation performed: [id number]     Procedure: XR chest 1 view portable    Result Date: 7/7/2022  Narrative: CHEST INDICATION:   sepsis  COMPARISON:  Chest radiograph 4/25/2022  EXAM PERFORMED/VIEWS:  XR CHEST PORTABLE FINDINGS:  Left-sided chest wall intracardiac device is identified  Leads are intact  Right chest wall port with catheter tip at the cavoatrial junction  Right IJ dialysis catheter with tip in the right atrium  Heart shadow is enlarged but unchanged from prior exam  The lungs are clear  No pneumothorax or pleural effusion  Osseous structures appear within normal limits for patient age  Impression: No acute cardiopulmonary disease  Workstation performed: [id number]     Procedure: XR foot 3+ views LEFT    Result Date: 7/7/2022  Narrative: LEFT FOOT INDICATION:   L foot swelling and pain  COMPARISON:  3/24/2020 VIEWS:  XR FOOT 3+ VW LEFT FINDINGS: Patient is status post amputation 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 at the level of the metatarsal mid shaft  Cortical margins are sharp  The patient is also status post resection of the 2nd digit at the level of the proximal phalanx  Cortical margins are also sharp and this region  Calcaneal spur(s) noted  No lytic or blastic osseous lesion  There is soft tissue swelling noted at the level of the 2nd digit  Impression: No radiographic evidence for osteomyelitis  Status post amputation of the 1st and 2nd digits as described above  Workstation performed: [id number]     Procedure: Echo follow up/limited w/ contrast if indicated    Result Date: 7/8/2022  Narrative: [facility]  Left Ventricle: Left ventricular cavity size is normal  Wall thickness is mildly increased  There is mild concentric hypertrophy  The left ventricular ejection fraction is 65%   Systolic function is normal  Wall motion is normal    Aortic Valve: The aortic valve is trileaflet  The leaflets are mildly thickened  The leaflets are moderately calcified  There is mildly reduced mobility  There is mild to moderate regurgitation  There is mild to moderate stenosis    Mitral Valve: There is moderate annular calcification  There is mild to moderate stenosis  The mitral valve mean gradient is 6 mmHg    Tricuspid Valve: There is mild to moderate regurgitation  The right ventricular systolic pressure is moderately elevated  The estimated right ventricular systolic pressure is 56 82 mmHg    IVC/SVC: The right atrial pressure is estimated at 15 0 mmHg  The inferior vena cava is dilated  Respirophasic changes were blunted (less than 50% variation)    Pericardium: There is a trivial pericardial effusion  There is a left pleural effusion  This is a limited study to evaluate for endocarditis  But is technically limited for the evaluation of the same  Although there is no diagnostic evidence of endocarditis noted on this, this possibility cannot be excluded on the basis of this  If clinical suspicion for same persists, then further evaluation with a transesophageal echocardiogram may provide additional information         Current Facility-Administered Medications   Medication Dose Route Frequency    acetaminophen (TYLENOL) tablet 650 mg  650 mg Oral Q6H PRN    amLODIPine (NORVASC) tablet 10 mg  10 mg Oral Daily    apixaban (ELIQUIS) tablet 5 mg  5 mg Oral BID    insulin glargine (LANTUS) subcutaneous injection 10 Units 0 1 mL  10 Units Subcutaneous HS    insulin lispro (HumaLOG) 100 units/mL subcutaneous injection 1-6 Units  1-6 Units Subcutaneous TID AC    loperamide (IMODIUM) capsule 2 mg  2 mg Oral Q4H PRN    metoprolol tartrate (LOPRESSOR) tablet 50 mg  50 mg Oral Q12H    midodrine (PROAMATINE) tablet 5 mg  5 mg Oral Once per day on Mon Wed Fri    sevelamer (RENAGEL) tablet 800 mg  800 mg Oral TID With Meals    tamsulosin (FLOMAX) capsule 0 4 mg  0 4 mg Oral Daily With Dinner    torsemide [facility]) tablet 80 mg  80 mg Oral Once per day on Sun Tue Thu Sat    vancomycin (VANCOCIN) IVPB (premix in dextrose) 1,000 mg 200 mL  10 mg/kg Intravenous Once per day on Mon Wed Fri     Medications Discontinued During This Encounter   Medication Reason    vancomycin (VANCOCIN) 1,250 mg in sodium chloride 0 9 % 250 mL IVPB     allopurinol (ZYLOPRIM) tablet 300 mg     cefepime (MAXIPIME) IVPB (premix in dextrose) 1,000 mg 50 mL     sodium bicarbonate tablet 650 mg     vancomycin (VANCOCIN) IVPB (premix in dextrose) 1,000 mg 200 mL     apixaban (ELIQUIS) tablet 5 mg        Alysa Vargas, DO      This progress note was produced in part using a dictation device which may document imprecise wording from author's original intent

## 2022-07-09 NOTE — ASSESSMENT & PLAN NOTE
· On dialysis Monday/Wednesday/Friday  · Nephrology consult for dialysis  · 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, sevelamer

## 2022-07-09 NOTE — PLAN OF CARE
Problem: OCCUPATIONAL THERAPY ADULT  Goal: Performs self-care activities at highest level of function for planned discharge setting  See evaluation for individualized goals  Description: Treatment Interventions: ADL retraining, Functional transfer training, UE strengthening/ROM, Endurance training, Patient/family training, Compensatory technique education, Energy conservation          See flowsheet documentation for full assessment, interventions and recommendations  Note: Limitation: Decreased ADL status, Decreased UE strength, Decreased endurance, Decreased self-care trans, Decreased high-level ADLs  Prognosis: Good  Assessment: Patient is a 74 y o  male seen for OT evaluation s/p admit to [facility] 19 on 7/7/2022 w/Severe sepsis with lactic acidosis ([facility] [address] )  Commorbidities affecting patient's functional performance at time of assessment include: acute respiratory failure c hypoxia, colon cancer, diabetic infection of L foot, ESRD, A-fib, CKD, and DM2  Orders placed for OT evaluation and treatment and up with assistance  Performed at least two patient identifiers during session including name and wristband  Prior to admission, Patient reporting being independent with ADLs/IADLs, ambulatory with no AD and lives with family in a two story house  Personal factors affecting patient at time of initial evaluation include: steps to enter, difficulty performing ADLs and difficulty performing IADLs  Upon evaluation, patient requires supervision assist for UB ADLs, supervision and minimal  assist for LB ADLs, transfers and functional ambulation in room and bathroom with supervision assist, with the use of no AD  Patient is oriented x 4    Occupational performance is affected by the following deficits: decreased muscle strength, dynamic sit/ stand balance deficit with poor standing tolerance time for self care and functional mobility, decreased activity tolerance and delayed righting and equilibrium reactions  Patient to benefit from continued Occupational Therapy treatment while in the hospital to address deficits as defined above and maximize level of functional independence with ADLs and functional mobility  Occupational Performance areas to address include: bathing/ shower, dressing, toilet hygiene, transfer to all surfaces, functional ambulation, medication routine/ management, IADLS: Household maintenance, IADLs: safety procedures and IADLs: meal prep/ clean up  From OT standpoint, recommendation at time of d/c would be Home with outpatient rehabilitation       OT Discharge Recommendation: Home with outpatient rehabilitation  OT - OK to Discharge: Yes (Once medically cleared)     Genie Bray OT

## 2022-07-09 NOTE — PLAN OF CARE
Problem: PHYSICAL THERAPY ADULT  Goal: Performs mobility at highest level of function for planned discharge setting  See evaluation for individualized goals  Description: Treatment/Interventions: Functional transfer training, LE strengthening/ROM, Therapeutic exercise, Endurance training, Patient/family training, Equipment eval/education, Bed mobility, Gait training, Spoke to nursing, Elevations  Equipment Recommended:  (TBD pending progress)       See flowsheet documentation for full assessment, interventions and recommendations  Note: Prognosis: Good  Problem List: Decreased strength, Decreased endurance, Impaired balance, Decreased mobility (activity intolerance)  Assessment: Pt is 74 y o  male seen for high-complexity PT evaluation on 7/9/2022 s/p admit to [facility] 19 on 7/7/2022 w/ Severe sepsis with lactic acidosis ([address] )  PT was consulted to assess pt's functional mobility and d/c needs  Order placed for PT eval and tx, w/ up w/ A order  PTA, pt lives c wife in 2 [place] c 1 [place]UR[place], amb s AD at baseline, retired, (I) ADLs  At time of eval, pt performs all OOB mobility tasks at SUP level  Upon evaluation, pt presenting with impaired functional mobility d/t decreased strength, decreased endurance, impaired balance, decreased mobility and activity intolerance  Pertinent PMHx and current co-morbidities affecting pt's physical performance at time of assessment include: CKD stage 4, colon cancer, ESRD M/W/F, AFib, DM type 2, diabetic infection of L foot, gram-positive bacteremia, acute respiratory failure c hypoxia  Personal factors affecting pt at time of eval include: unable to perform physical activity  The following objective measures performed on IE also reveal limitations: AM-PAC 6-Clicks: 03/71  Pt's clinical presentation is currently unstable/unpredictable seen in pt's presentation of ongoing medical assessment, on telemetry monitoring and supplemental O2 requirement to maintain O2 sats > 90%  Overall, pt's rehab potential and prognosis to return to PLOF is good as impacted by objective findings, warranting pt to receive further skilled PT interventions to address identified impairments, activity limitation(s), and participation restriction(s)  Goal for patient is to go home  Pt to benefit from continued PT tx to address deficits as defined above and maximize level of functional independent mobility and consistency in order for pt to improve mobility tolerance  From PT/mobility standpoint, recommendation at time of d/c would be home with outpatient rehabilitation pending progress in order to facilitate return to PLOF  Barriers to Discharge: None        PT Discharge Recommendation: Home with outpatient rehabilitation          See flowsheet documentation for full assessment

## 2022-07-09 NOTE — PROGRESS NOTES
[address]  Progress Note Onelia Hua 1955, 74 y o  male MRN: [id number]  Unit/Bed#: ICU 11-01 Encounter: [id number]  Primary Care Provider: Milton Santiago DO   Date and time admitted to hospital: 7/7/2022 12:44 PM    * Severe sepsis with lactic acidosis ([address] )  Assessment & Plan  · Secondary to diabetic foot infection present on admission with fever and leukocytosis  · Lactic acidosis resolved  · Will follow-up bone scan of left foot  · Will continue IV antibiotics with vancomycin  · Infectious Disease recommended discontinuing cefepime  · Trend procalcitonin  · Blood cultures positive for gram-positive cocci  · Podiatry consultation    Acute respiratory failure with hypoxia ([address] )  Assessment & Plan  · Requiring 2 L of nasal cannula supplemental O2 this morning  · Possibly secondary to volume overload  · Wean O2 as tolerated  · Nephrology following ; appreciate recommendations regarding possible diuresis  · Goal SpO2 > 90%    Diabetic infection of left foot ([address] )  Assessment & Plan  · Will continue antibiotics  · Podiatry consulted  · Follow-up bone scan  · Further treatment as above    Gram-positive bacteremia  Assessment & Plan  · Source likely left diabetic toe ulcer  · Likely MSSA  · Repeat blood cultures pending  · Continue vancomycin ; will likely deescalate to cefazolin however will await Infectious Disease recommendations  · Follow-up repeat blood cultures    Type 2 diabetes mellitus with chronic kidney disease, with long-term current use of insulin ([facility])  Assessment & Plan  · Will monitor fingersticks  · Insulin sliding scale  · Lantus 10 units at bedtime  · Diabetic diet  · Will adjust diabetic regimen as needed    Persistent atrial fibrillation ([address] )  Assessment & Plan  · Will continue Lopressor  · Eliquis held in case patient requires surgical intervention    If no plan for surgery will resume Eliquis    ESRD (end stage renal disease) [facility])  Assessment & Plan  Lab Results   Component Value Date    EGFR 17 2022    EGFR 13 2022    EGFR 16 2022    CREATININE 3 46 (H) 2022    CREATININE 4 17 (H) 2022    CREATININE 3 53 (H) 2022   · ESRD on HD Monday/Wednesday/Friday  · Nephrology following ; appreciate recommendations    Stage 4 chronic kidney disease ([address] )  Assessment & Plan  · On dialysis Monday/Wednesday/Friday  · Nephrology consult for dialysis  · 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, sevelamer    Colon cancer [facility])  Assessment & Plan  · Patient follows with Hematology/Oncology  · Last chemo session was on 2022    VTE Pharmacologic Prophylaxis: Eliquis    Patient Centered Rounds: Patient care rounds were performed with nursing    Discussions with Specialists or Other Care Team Provider:  Will speak to Podiatry, Nephrology, Infectious Diseases    Education and Discussions with Family / Patient: Spoke with patient at bedside    Time Spent for Care: 30  More than 50% of total time spent on counseling and coordination of care as described above  Current Length of Stay: 2 day(s)    Current Patient Status: Inpatient     Certification Statement: The patient will continue to require additional inpatient hospital stay due to left diabetic foot ulcer complicated by Gram-positive bacteremia    Discharge Plan:  Pending PT/OT evaluation and treatment    Code Status: Level 1 - Full Code      Subjective:   Patient seen and examined at bedside  No acute events overnight  Denies chest pain, SOB, diaphoresis, nausea/vomiting/diarrhea, fevers/chills  Objective:     Vitals:   Temp (24hrs), Av 1 °F (37 3 °C), Min:96 6 °F (35 9 °C), Max:102 °F (38 9 °C)    Temp:  [96 6 °F (35 9 °C)-102 °F (38 9 °C)] 98 8 °F (37 1 °C)  HR:  [60-98] 60  Resp:  [17-29] 19  BP: ()/(44-95) 124/58  SpO2:  [83 %-100 %] 100 %  Body mass index is 29 96 kg/m²  Input and Output Summary (last 24 hours):        Intake/Output Summary (Last 24 hours) at 2022 0910  Last data filed at 7/9/2022 0601  Gross per 24 hour   Intake 700 ml   Output 541 ml   Net 159 ml       Physical Exam:     Physical Exam  Vitals and nursing note reviewed  Constitutional:       Appearance: He is well-developed  HENT:      Head: Normocephalic and atraumatic  Eyes:      Conjunctiva/sclera: Conjunctivae normal    Cardiovascular:      Rate and Rhythm: Normal rate and regular rhythm  Heart sounds: No murmur heard  Pulmonary:      Effort: Pulmonary effort is normal  No respiratory distress  Breath sounds: Normal breath sounds  Abdominal:      Palpations: Abdomen is soft  Tenderness: There is no abdominal tenderness  Musculoskeletal:      Cervical back: Neck supple  Comments: Left diabetic toe ulcer with surrounding cellulitis   Skin:     General: Skin is warm and dry  Neurological:      Mental Status: He is alert  Additional Data:     Labs:  I have reviewed pertinent results     Results from last 7 days   Lab Units 07/09/22 0542 07/08/22 0447   WBC Thousand/uL 14 95* 16 91*   HEMOGLOBIN g/dL 9 0* 8 2*   HEMATOCRIT % 27 5* 25 4*   PLATELETS Thousands/uL 165 172   BANDS PCT %  --  3   NEUTROS PCT % 88*  --    LYMPHS PCT % 5*  --    LYMPHO PCT %  --  2*   MONOS PCT % 5  --    MONO PCT %  --  5   EOS PCT % 1 0     Results from last 7 days   Lab Units 07/09/22  0542 07/08/22 0447 07/07/22  1300   SODIUM mmol/L 131* 129* 130*   POTASSIUM mmol/L 4 5 4 3 4 8   CHLORIDE mmol/L 92* 95* 90*   CO2 mmol/L 26 25 28   BUN mg/dL 36* 45* 37*   CREATININE mg/dL 3 46* 4 17* 3 53*   ANION GAP mmol/L 13 9 12   CALCIUM mg/dL 8 8 8 4 8 9   ALBUMIN g/dL  --  3 0* 3 5   TOTAL BILIRUBIN mg/dL  --   --  1 10*   ALK PHOS U/L  --   --  93   ALT U/L  --   --  10   AST U/L  --   --  9*   GLUCOSE RANDOM mg/dL 138 127 182*     Results from last 7 days   Lab Units 07/07/22  1300   INR  2 15*     Results from last 7 days   Lab Units 07/09/22  0706 07/08/22  2208 07/08/22  1808 07/08/22  1648 07/08/22  1627 07/08/22  1217 07/08/22  0622 07/07/22  2117 07/07/22  1852   POC GLUCOSE mg/dl 136 206* 130 161* 62* 131 131 177* 232*         Results from last 7 days   Lab Units 07/08/22  0447 07/07/22  1646 07/07/22  1300   LACTIC ACID mmol/L  --  1 9 3 2*   PROCALCITONIN ng/ml 1 72*  --  0 92*         Imaging: I have reviewed pertinent imaging       Recent Cultures (last 7 days):     Results from last 7 days   Lab Units 07/07/22  1311 07/07/22  1300   BLOOD CULTURE  Staphylococcus aureus* Staphylococcus aureus*   GRAM STAIN RESULT  Gram positive cocci in pairs, chains and clusters* Gram positive cocci in pairs, chains and clusters*       Last 24 Hours Medication List:   Current Facility-Administered Medications   Medication Dose Route Frequency Provider Last Rate    acetaminophen  650 mg Oral Q6H PRN Sumit Elizalde PA-C      amLODIPine  10 mg Oral Daily Jana Cornejo MD      apixaban  5 mg Oral BID Jana Cornejo MD      insulin glargine  10 Units Subcutaneous HS Jana Cornejo MD      insulin lispro  1-6 Units Subcutaneous TID [facility] Mikaela Martinez MD      metoprolol tartrate  50 mg Oral Q12H Jana Cornejo MD      midodrine  5 mg Oral Once per day on Mon Wed Fri Jana Cornejo MD      sevelamer  800 mg Oral TID With Meals Jana Cornejo MD      tamsulosin  0 4 mg Oral Daily With Tulio Mckenna MD      torsemide  80 mg Oral Once per day on Sun Tue Thu Sat Jana Cornejo MD      vancomycin  10 mg/kg Intravenous Once per day on Mon Wed Fri Jana Cornejo MD 1,000 mg (07/08/22 1806)        Today, Patient Was Seen By: Niranjan Tomas DO    ** Please Note: Dictation voice to text software may have been used in the creation of this document   **

## 2022-07-09 NOTE — ASSESSMENT & PLAN NOTE
· Secondary to diabetic foot infection present on admission with fever and leukocytosis  · Lactic acidosis resolved  · Will follow-up bone scan of left foot  · Will continue IV antibiotics with vancomycin  · Infectious Disease recommended discontinuing cefepime  · Trend procalcitonin  · Blood cultures positive for gram-positive cocci  · Podiatry consultation

## 2022-07-09 NOTE — PLAN OF CARE
Problem: INFECTION - ADULT  Goal: Absence or prevention of progression during hospitalization  Description: INTERVENTIONS:  - Assess and monitor for signs and symptoms of infection  - Monitor lab/diagnostic results  - Monitor all insertion sites, i e  indwelling lines, tubes, and drains  - Monitor endotracheal if appropriate and nasal secretions for changes in amount and color  - [place] appropriate cooling/warming therapies per order  - Administer medications as ordered  - Instruct and encourage patient and family to use good hand hygiene technique  - Identify and instruct in appropriate isolation precautions for identified infection/condition  Outcome: Progressing  Goal: Absence of fever/infection during neutropenic period  Description: INTERVENTIONS:  - Monitor WBC    Outcome: Progressing

## 2022-07-10 LAB
ANION GAP SERPL CALCULATED.3IONS-SCNC: 9 MMOL/L (ref 4–13)
BACTERIA BLD CULT: ABNORMAL
BACTERIA BLD CULT: ABNORMAL
BASOPHILS # BLD AUTO: 0.03 THOUSANDS/ΜL (ref 0–0.1)
BASOPHILS NFR BLD AUTO: 0 % (ref 0–1)
BUN SERPL-MCNC: 55 MG/DL (ref 5–25)
CALCIUM SERPL-MCNC: 8.2 MG/DL (ref 8.4–10.2)
CHLORIDE SERPL-SCNC: 93 MMOL/L (ref 96–108)
CO2 SERPL-SCNC: 26 MMOL/L (ref 21–32)
CREAT SERPL-MCNC: 4.06 MG/DL (ref 0.6–1.3)
EOSINOPHIL # BLD AUTO: 0.36 THOUSAND/ΜL (ref 0–0.61)
EOSINOPHIL NFR BLD AUTO: 2 % (ref 0–6)
ERYTHROCYTE [DISTWIDTH] IN BLOOD BY AUTOMATED COUNT: 17.1 % (ref 11.6–15.1)
GFR SERPL CREATININE-BSD FRML MDRD: 14 ML/MIN/1.73SQ M
GLUCOSE SERPL-MCNC: 136 MG/DL (ref 65–140)
GLUCOSE SERPL-MCNC: 139 MG/DL (ref 65–140)
GLUCOSE SERPL-MCNC: 268 MG/DL (ref 65–140)
GLUCOSE SERPL-MCNC: 303 MG/DL (ref 65–140)
GRAM STN SPEC: ABNORMAL
GRAM STN SPEC: ABNORMAL
HCT VFR BLD AUTO: 25.5 % (ref 36.5–49.3)
HGB BLD-MCNC: 8.2 G/DL (ref 12–17)
IMM GRANULOCYTES # BLD AUTO: 0.19 THOUSAND/UL (ref 0–0.2)
IMM GRANULOCYTES NFR BLD AUTO: 1 % (ref 0–2)
LYMPHOCYTES # BLD AUTO: 1.25 THOUSANDS/ΜL (ref 0.6–4.47)
LYMPHOCYTES NFR BLD AUTO: 9 % (ref 14–44)
MAGNESIUM SERPL-MCNC: 1.9 MG/DL (ref 1.9–2.7)
MCH RBC QN AUTO: 32.7 PG (ref 26.8–34.3)
MCHC RBC AUTO-ENTMCNC: 32.2 G/DL (ref 31.4–37.4)
MCV RBC AUTO: 102 FL (ref 82–98)
MONOCYTES # BLD AUTO: 1.13 THOUSAND/ΜL (ref 0.17–1.22)
MONOCYTES NFR BLD AUTO: 8 % (ref 4–12)
NEUTROPHILS # BLD AUTO: 11.83 THOUSANDS/ΜL (ref 1.85–7.62)
NEUTS SEG NFR BLD AUTO: 80 % (ref 43–75)
NRBC BLD AUTO-RTO: 1 /100 WBCS
PHOSPHATE SERPL-MCNC: 3.1 MG/DL (ref 2.3–4.1)
PLATELET # BLD AUTO: 202 THOUSANDS/UL (ref 149–390)
PMV BLD AUTO: 10.4 FL (ref 8.9–12.7)
POTASSIUM SERPL-SCNC: 3.7 MMOL/L (ref 3.5–5.3)
RBC # BLD AUTO: 2.51 MILLION/UL (ref 3.88–5.62)
S AUREUS DNA BLD POS QL NAA+NON-PROBE: DETECTED
SODIUM SERPL-SCNC: 128 MMOL/L (ref 135–147)
WBC # BLD AUTO: 14.79 THOUSAND/UL (ref 4.31–10.16)

## 2022-07-10 PROCEDURE — 99232 SBSQ HOSP IP/OBS MODERATE 35: CPT | Performed by: PODIATRIST

## 2022-07-10 PROCEDURE — 99232 SBSQ HOSP IP/OBS MODERATE 35: CPT | Performed by: INTERNAL MEDICINE

## 2022-07-10 PROCEDURE — 85025 COMPLETE CBC W/AUTO DIFF WBC: CPT | Performed by: INTERNAL MEDICINE

## 2022-07-10 PROCEDURE — 82948 REAGENT STRIP/BLOOD GLUCOSE: CPT

## 2022-07-10 PROCEDURE — 80048 BASIC METABOLIC PNL TOTAL CA: CPT | Performed by: INTERNAL MEDICINE

## 2022-07-10 PROCEDURE — 83735 ASSAY OF MAGNESIUM: CPT | Performed by: INTERNAL MEDICINE

## 2022-07-10 PROCEDURE — 0JBR0ZZ EXCISION OF LEFT FOOT SUBCUTANEOUS TISSUE AND FASCIA, OPEN APPROACH: ICD-10-PCS | Performed by: PODIATRIST

## 2022-07-10 PROCEDURE — 10061 I&D ABSCESS COMP/MULTIPLE: CPT | Performed by: PODIATRIST

## 2022-07-10 PROCEDURE — 84100 ASSAY OF PHOSPHORUS: CPT | Performed by: INTERNAL MEDICINE

## 2022-07-10 RX ADMIN — METOPROLOL TARTRATE 50 MG: 50 TABLET, FILM COATED ORAL at 05:43

## 2022-07-10 RX ADMIN — APIXABAN 5 MG: 5 TABLET, FILM COATED ORAL at 08:34

## 2022-07-10 RX ADMIN — INSULIN LISPRO 4 UNITS: 100 INJECTION, SOLUTION INTRAVENOUS; SUBCUTANEOUS at 11:49

## 2022-07-10 RX ADMIN — SEVELAMER HYDROCHLORIDE 800 MG: 800 TABLET, FILM COATED PARENTERAL at 15:40

## 2022-07-10 RX ADMIN — APIXABAN 5 MG: 5 TABLET, FILM COATED ORAL at 15:40

## 2022-07-10 RX ADMIN — INSULIN LISPRO 3 UNITS: 100 INJECTION, SOLUTION INTRAVENOUS; SUBCUTANEOUS at 15:40

## 2022-07-10 RX ADMIN — TAMSULOSIN HYDROCHLORIDE 0.4 MG: 0.4 CAPSULE ORAL at 15:40

## 2022-07-10 RX ADMIN — TORSEMIDE 80 MG: 20 TABLET ORAL at 08:34

## 2022-07-10 RX ADMIN — ACETAMINOPHEN 650 MG: 325 TABLET ORAL at 14:39

## 2022-07-10 RX ADMIN — AMLODIPINE BESYLATE 10 MG: 10 TABLET ORAL at 21:32

## 2022-07-10 RX ADMIN — INSULIN GLARGINE 10 UNITS: 100 INJECTION, SOLUTION SUBCUTANEOUS at 21:32

## 2022-07-10 RX ADMIN — SEVELAMER HYDROCHLORIDE 800 MG: 800 TABLET, FILM COATED PARENTERAL at 11:49

## 2022-07-10 RX ADMIN — SEVELAMER HYDROCHLORIDE 800 MG: 800 TABLET, FILM COATED PARENTERAL at 08:34

## 2022-07-10 NOTE — PROGRESS NOTES
Progress Note - Nephrology   Amelia Perez 74 y o  male MRN: [id number]  Unit/Bed#: ICU 11-01 Encounter: [id number]    A/P:  1  End-stage renal disease               patient next hemodialysis session will be tomorrow, Monday July 11th, continue Monday Wednesday Friday hemodialysis sessions at this time  2  Secondary hyperparathyroidism, renal               continue check phosphorus levels from time to time, continue sevelamer phosphorus binders, check PTH in the outpatient setting  3  Anemia chronic kidney disease               Epogen currently on hold due to history of colon cancer  Continue to provide transfusions with packed red blood cells as indicated  4  Severe sepsis associated with septicemia               as mentioned yesterday, the patient has presumptive MSSA infection in the blood stream   Continue care according to Infectious Disease recommendations  Possible amputation of the left foot or partial amputation according to clinical necessity  At this point transmetatarsal amputation is the most likely procedure  5  Diabetic nephropathy  6   Diabetic foot ulcer    Follow up reason for today's visit:  End-stage renal disease/secondary hyperparathyroidism/anemia chronic kidney disease    Diabetic infection of left foot [facility])    Patient Active Problem List   Diagnosis    Bilateral leg edema    Diabetic ulcer of toe of left foot associated with type 2 diabetes mellitus ([facility] [address] )    Easy bruising    Eczema    Erectile dysfunction of non-organic origin    Hyperlipidemia    Uremic acidosis    Arthritis    Peripheral arterial disease (HCC)    PVCs (premature ventricular contractions)    Rhinitis    Systolic murmur    Vertigo    Complete heart block (HCC)    Metabolic acidosis    ELZBIETA (acute kidney injury) (HCC)    Other hyperlipidemia    Persistent atrial fibrillation (HCC)    Persistent proteinuria    Volume overload    Urinary retention    NICOLASA (obstructive sleep apnea)    Type 2 diabetes mellitus with chronic kidney disease, with long-term current use of insulin (HCC)    Hypertension    Stage 4 chronic kidney disease (HCC)    Nonrheumatic aortic valve stenosis    Anemia    Fever    Mitral stenosis    Abnormal CT of the chest    Hyperkalemia    Acute pulmonary edema (HCC)    Chronic diastolic (congestive) heart failure (HCC)    Left renal artery stenosis (HCC)    S/P amputation of lesser toe, left (HCC)    S/P amputation of lesser toe, right (HCC)    Elevated troponin I level    Staphylococcus aureus bacteremia    Type 2 diabetes mellitus with diabetic neuropathy, without long-term current use of insulin (HCC)    Hyponatremia    Iron deficiency anemia    Anxiety    Osteomyelitis of left foot (HCC)    Amputation of left great toe (HCC)    Multiple drug resistant organism (MDRO) culture positive    Renovascular hypertension    SSS (sick sinus syndrome) (HCC)    Chronic obstructive pulmonary disease (HCC)    Absence of toe (HCC)    Chronic painful diabetic neuropathy (HCC)    Onychomycosis    Colon cancer (HCC)    Acute kidney injury superimposed on chronic kidney disease ([facility] [address] )    RSV (respiratory syncytial virus pneumonia)    Chemotherapy-induced neutropenia (HCC)    Chemotherapy-induced nausea    Acute on chronic diastolic congestive heart failure (HCC)    Other iron deficiency anemias    Pulmonary hypertension (HCC)    S/P arteriovenous (AV) fistula creation    Severe sepsis with lactic acidosis (HCC)    Diabetic infection of left foot (HCC)    Gram-positive bacteremia    Acute respiratory failure with hypoxia (HCC)    ESRD (end stage renal disease) (HCC)         Subjective:   No acute events overnight, patient is eating and drinking as best as possible  Objective:     Vitals: Blood pressure 130/60, pulse 60, temperature (!) 97 3 °F (36 3 °C), temperature source Temporal, resp   rate (!) 27, height 5' 10" (1 778 m), weight 96 4 kg (212 lb 8 4 oz), SpO2 93 % ,Body mass index is 30 49 kg/m²  Weight (last 2 days)     Date/Time Weight    07/10/22 0543 96 4 (212 52)    07/09/22 0600 94 7 (208 78)    07/08/22 2000 95 6 (210 76)    07/08/22 1200 89 8 (197 97)            Intake/Output Summary (Last 24 hours) at 7/10/2022 1328  Last data filed at 7/10/2022 1000  Gross per 24 hour   Intake 530 ml   Output 950 ml   Net -420 ml     I/O last 3 completed shifts:   In: 1010 [P O :810; IV Piggyback:200]  Out: 950 [Urine:950]    HD Permanent Double Catheter (Active)   Reasons to continue HD Cath Treatment Therapy 07/09/22 2100   Goal for Removal N/A- chronic HD catheter 07/09/22 2100   Line Necessity Reviewed Yes, reviewed with provider 07/09/22 2100   Site Assessment WDL 07/08/22 1300   Proximal Lumen Status Capped 07/09/22 0400   Distal Lumen Status Capped 07/09/22 0400   Dressing Type Other (Comment);Gauze 07/08/22 1300   Dressing Status Clean;Dry 07/08/22 1300   Dressing Intervention Dressing changed 07/08/22 1300   Dressing Change Due 07/15/22 07/08/22 1300       Physical Exam: /60   Pulse 60   Temp (!) 97 3 °F (36 3 °C) (Temporal)   Resp (!) 27   Ht 5' 10" (1 778 m)   Wt 96 4 kg (212 lb 8 4 oz)   SpO2 93%   BMI 30 49 kg/m²     General Appearance:    Alert, cooperative, no distress, appears stated age   Head:    Normocephalic, without obvious abnormality, atraumatic   Eyes:    Conjunctiva/corneas clear   Ears:    Normal external ears   Nose:   Nares normal, septum midline, mucosa normal, no drainage    or sinus tenderness   Throat:   Lips, mucosa, and tongue normal; teeth and gums normal   Neck:   Supple   Back:     Symmetric, no curvature, ROM normal, no CVA tenderness   Lungs:     Clear to auscultation bilaterally, respirations unlabored   Chest wall:    No tenderness or deformity   Heart:    Regular rate and rhythm, S1 and S2 normal, no murmur, rub   or gallop   Abdomen:     Soft, non-tender, bowel sounds active   Extremities:   Extremities normal, atraumatic, no cyanosis, mild bilateral lower extremity edema   Skin:   Skin color, texture, turgor normal, no rashes or lesions   Lymph nodes:   Cervical normal   Neurologic:   CNII-XII intact            Lab, Imaging and other studies: I have personally reviewed pertinent labs  CBC:   Lab Results   Component Value Date    WBC 14 79 (H) 07/10/2022    HGB 8 2 (L) 07/10/2022    HCT 25 5 (L) 07/10/2022     (H) 07/10/2022     07/10/2022    MCH 32 7 07/10/2022    MCHC 32 2 07/10/2022    RDW 17 1 (H) 07/10/2022    MPV 10 4 07/10/2022    NRBC 1 07/10/2022     CMP:   Lab Results   Component Value Date    K 3 7 07/10/2022    CL 93 (L) 07/10/2022    CO2 26 07/10/2022    BUN 55 (H) 07/10/2022    CREATININE 4 06 (H) 07/10/2022    CALCIUM 8 2 (L) 07/10/2022    EGFR 14 07/10/2022         Results from last 7 days   Lab Units 07/10/22  0547 07/09/22  0542 07/08/22  0447 07/07/22  1300   POTASSIUM mmol/L 3 7 4 5 4 3 4 8   CHLORIDE mmol/L 93* 92* 95* 90*   CO2 mmol/L 26 26 25 28   BUN mg/dL 55* 36* 45* 37*   CREATININE mg/dL 4 06* 3 46* 4 17* 3 53*   CALCIUM mg/dL 8 2* 8 8 8 4 8 9   ALK PHOS U/L  --   --   --  93   ALT U/L  --   --   --  10   AST U/L  --   --   --  9*         Phosphorus:   Lab Results   Component Value Date    PHOS 3 1 07/10/2022     Magnesium:   Lab Results   Component Value Date    MG 1 9 07/10/2022     Urinalysis: No results found for: COLORU, CLARITYU, SPECGRAV, PHUR, LEUKOCYTESUR, NITRITE, PROTEINUA, GLUCOSEU, KETONESU, BILIRUBINUR, BLOODU  Ionized Calcium: No results found for: CAION  Coagulation: No results found for: PT, INR, APTT  Troponin: No results found for: TROPONINI  ABG: No results found for: PHART, SVT4NNV, PO2ART, LQS8PAE, F7RNLJAX, BEART, SOURCE  Radiology review:     IMAGING  Procedure: XR chest portable    Result Date: 7/9/2022  Narrative: CHEST INDICATION:   hypoxia  COMPARISON:  Chest radiograph from 7/7/2022 and 4/25/2022, chest CT from 11/24/2021   EXAM PERFORMED/VIEWS:  XR CHEST PORTABLE FINDINGS:  Right port at cavoatrial junction  Right central catheter in upper right atrium  Normal heart size with left subclavian pacemaker lead in right atrium and right ventricle  Moderate pulmonary edema with left base atelectasis  No definite effusion  No pneumothorax  Skeleton normal for age  Suture anchor in left humeral head  Impression: Moderate pulmonary edema with left base atelectasis  Workstation performed: [id number]     Procedure: XR chest 1 view portable    Result Date: 7/7/2022  Narrative: CHEST INDICATION:   sepsis  COMPARISON:  Chest radiograph 4/25/2022  EXAM PERFORMED/VIEWS:  XR CHEST PORTABLE FINDINGS:  Left-sided chest wall intracardiac device is identified  Leads are intact  Right chest wall port with catheter tip at the cavoatrial junction  Right IJ dialysis catheter with tip in the right atrium  Heart shadow is enlarged but unchanged from prior exam  The lungs are clear  No pneumothorax or pleural effusion  Osseous structures appear within normal limits for patient age  Impression: No acute cardiopulmonary disease  Workstation performed: [id number]     Procedure: Echo follow up/limited w/ contrast if indicated    Result Date: 7/8/2022  Narrative: Vijay Mukherjee  Left Ventricle: Left ventricular cavity size is normal  Wall thickness is mildly increased  There is mild concentric hypertrophy  The left ventricular ejection fraction is 65%  Systolic function is normal  Wall motion is normal    Aortic Valve: The aortic valve is trileaflet  The leaflets are mildly thickened  The leaflets are moderately calcified  There is mildly reduced mobility  There is mild to moderate regurgitation  There is mild to moderate stenosis    Mitral Valve: There is moderate annular calcification  There is mild to moderate stenosis  The mitral valve mean gradient is 6 mmHg    Tricuspid Valve: There is mild to moderate regurgitation  The right ventricular systolic pressure is moderately elevated   The estimated right ventricular systolic pressure is 47 22 mmHg    IVC/SVC: The right atrial pressure is estimated at 15 0 mmHg  The inferior vena cava is dilated  Respirophasic changes were blunted (less than 50% variation)    Pericardium: There is a trivial pericardial effusion  There is a left pleural effusion  This is a limited study to evaluate for endocarditis  But is technically limited for the evaluation of the same  Although there is no diagnostic evidence of endocarditis noted on this, this possibility cannot be excluded on the basis of this  If clinical suspicion for same persists, then further evaluation with a transesophageal echocardiogram may provide additional information         Current Facility-Administered Medications   Medication Dose Route Frequency    acetaminophen (TYLENOL) tablet 650 mg  650 mg Oral Q6H PRN    amLODIPine (NORVASC) tablet 10 mg  10 mg Oral Daily    apixaban (ELIQUIS) tablet 5 mg  5 mg Oral BID    insulin glargine (LANTUS) subcutaneous injection 10 Units 0 1 mL  10 Units Subcutaneous HS    insulin lispro (HumaLOG) 100 units/mL subcutaneous injection 1-6 Units  1-6 Units Subcutaneous TID AC    loperamide (IMODIUM) capsule 2 mg  2 mg Oral Q4H PRN    midodrine (PROAMATINE) tablet 5 mg  5 mg Oral Once per day on Mon Wed Fri    sevelamer (RENAGEL) tablet 800 mg  800 mg Oral TID With Meals    tamsulosin (FLOMAX) capsule 0 4 mg  0 4 mg Oral Daily With Dinner    torsemide [facility]) tablet 80 mg  80 mg Oral Once per day on Sun Tue Thu Sat    vancomycin (VANCOCIN) IVPB (premix in dextrose) 1,000 mg 200 mL  10 mg/kg Intravenous Once per day on Mon Wed Fri     Medications Discontinued During This Encounter   Medication Reason    vancomycin (VANCOCIN) 1,250 mg in sodium chloride 0 9 % 250 mL IVPB     allopurinol (ZYLOPRIM) tablet 300 mg     cefepime (MAXIPIME) IVPB (premix in dextrose) 1,000 mg 50 mL     sodium bicarbonate tablet 650 mg     vancomycin (VANCOCIN) IVPB (premix in dextrose) 1,000 mg 200 mL     apixaban (ELIQUIS) tablet 5 mg     metoprolol tartrate (LOPRESSOR) tablet 50 mg        Corrie Brock,       This progress note was produced in part using a dictation device which may document imprecise wording from author's original intent

## 2022-07-10 NOTE — PLAN OF CARE
Problem: INFECTION - ADULT  Goal: Absence or prevention of progression during hospitalization  Description: INTERVENTIONS:  - Assess and monitor for signs and symptoms of infection  - Monitor lab/diagnostic results  - Monitor all insertion sites, i e  indwelling lines, tubes, and drains  - Monitor endotracheal if appropriate and nasal secretions for changes in amount and color  - [place] appropriate cooling/warming therapies per order  - Administer medications as ordered  - Instruct and encourage patient and family to use good hand hygiene technique  - Identify and instruct in appropriate isolation precautions for identified infection/condition  7/10/2022 0947 by Devin Louis RN  Outcome: Progressing  7/10/2022 0737 by Devin Louis RN  Outcome: Progressing

## 2022-07-10 NOTE — PROGRESS NOTES
Vancomycin IV Pharmacy-to-Dose Consultation    Ester Mcgill is a 74 y o  male who is currently receiving Vancomycin IV with management by the Pharmacy Consult service  Assessment/Plan:  The patient was reviewed  Renal function is stable and no signs or symptoms of nephrotoxicity and/or infusion reactions were documented in the chart  Based on todays assessment, continue current vancomycin (day # 4) dosing of 1g after dialysis Mon, Wed, Fri , with a plan for trough to be drawn at 6am on 7/11  We will continue to follow the patients culture results and clinical progress daily      Jamila Barrios, Pharmacist

## 2022-07-10 NOTE — PLAN OF CARE
Problem: INFECTION - ADULT  Goal: Absence or prevention of progression during hospitalization  Description: INTERVENTIONS:  - Assess and monitor for signs and symptoms of infection  - Monitor lab/diagnostic results  - Monitor all insertion sites, i e  indwelling lines, tubes, and drains  - Monitor endotracheal if appropriate and nasal secretions for changes in amount and color  - [place] appropriate cooling/warming therapies per order  - Administer medications as ordered  - Instruct and encourage patient and family to use good hand hygiene technique  - Identify and instruct in appropriate isolation precautions for identified infection/condition  Outcome: Progressing

## 2022-07-10 NOTE — PROGRESS NOTES
Progress Note - Kimi Santos 74 y o  male MRN: [id number]    Unit/Bed#: ICU 11-01 Encounter: [id number]      Assessment:  1  Diabetes with neuropathy  2  Cellulitis of the left foot  3  Abscess of the left foot  4  Osteomyelitis of left foot  5  Gram-positive sepsis  2/2 2,3,4     Plan:  -after verbal consent was obtained at bedside incision and drainage was performed of the plantar left foot, the wound was 1st excisionally debrided the utilizing a sterile 15 blade with removal of necrotic tissue, callus, slough necrotic slough  There was purulent drainage emanated from the wound which measures approximately 1 5 x 1 5 x 0 to her 0 9 cm with probe to bone  This probe was then carried further into the 1st interspace where was then communicated with the dorsal toe wound  Approximately 2 cc of infected gouty tophi were expressed from the dorsal wound  The wound was dressed with 4x4s, Kerlix  -we will wait nuclear medicine scan for further podiatric intervention, due to probe to bone the extent of the abscess, this will likely come back positive and he will need further amputation  -we will also await arterial duplex for further events as well  -we discussed probable TMA depending on extent of bony involvement    Subjective:   Patient seen examined at bedside for his left foot wound, states he is feeling overall better with the addition of IV antibiotics  He does understand that he has a severe foot infection and will likely need further amputation  Objective:     Vitals: Blood pressure 130/60, pulse 60, temperature (!) 97 3 °F (36 3 °C), temperature source Temporal, resp  rate (!) 27, height 5' 10" (1 778 m), weight 96 4 kg (212 lb 8 4 oz), SpO2 93 %  ,Body mass index is 30 49 kg/m²        Intake/Output Summary (Last 24 hours) at 7/10/2022 1001  Last data filed at 7/10/2022 0800  Gross per 24 hour   Intake 710 ml   Output 950 ml   Net -240 ml       Physical Exam: /60   Pulse 60   Temp (!) 97 3 °F (36 3 °C) (Temporal)   Resp (!) 27   Ht 5' 10" (1 778 m)   Wt 96 4 kg (212 lb 8 4 oz)   SpO2 93%   BMI 30 49 kg/m²     There is a severe foot infection to the left foot with a wound that communicates to bone beneath the partially amputated remaining toe  There is severe cellulitis extends to the midfoot, the probe to bone area does extend to the interspace which does communicate with the dorsal wound on the top of the toe  There were approximately 2 cc of infected gouty tophi that were expressed of the dorsal area  Sanguinous drainage followed      MMT is intact, gross sensation is intact protective sensation is completely absent neurovascular status is at baseline        Invasive Devices  Report    Central Venous Catheter Line  Duration           Port A Cath Right Subclavian -- days          Peripheral Intravenous Line  Duration           Peripheral IV 07/07/22 Distal;Right;Ventral (anterior) Forearm 2 days          Line  Duration           Hemodialysis AV Fistula 06/01/22 Left Forearm 39 days          Hemodialysis Catheter  Duration           HD Permanent Double Catheter 74 days

## 2022-07-10 NOTE — ASSESSMENT & PLAN NOTE
· Will continue antibiotics with IV vancomycin  · Podiatry consulted ; appreciate recommendations  · Follow-up bone scan  · Further treatment as above

## 2022-07-10 NOTE — ASSESSMENT & PLAN NOTE
· Will continue Lopressor  · Eliquis held in case patient requires surgical intervention  · If no plan for surgery will resume Eliquis

## 2022-07-10 NOTE — ASSESSMENT & PLAN NOTE
· Resolved with IV fluid resuscitation and broad-spectrum IV antibiotics  · Secondary to diabetic foot infection present on admission with fever and leukocytosis  · Lactic acidosis resolved  · Will follow-up bone scan of left foot  · Will continue IV antibiotics with vancomycin  · Infectious Disease recommended discontinuing cefepime  · Trend procalcitonin  · Blood cultures positive for gram-positive cocci ; repeat blood cultures pending  · Podiatry consultation

## 2022-07-10 NOTE — PLAN OF CARE
Problem: Potential for Falls  Goal: Patient will remain free of falls  Description: INTERVENTIONS:  - Educate patient/family on patient safety including physical limitations  - Instruct patient to call for assistance with activity   - Consult OT/PT to assist with strengthening/mobility   - Keep Call bell within reach  - Keep bed low and locked with side rails adjusted as appropriate  - Keep care items and personal belongings within reach  - Initiate and maintain comfort rounds  - Make Fall Risk Sign visible to staff  - Apply yellow socks and bracelet for high fall risk patients  - Consider moving patient to room near nurses station  Outcome: Progressing     Problem: MOBILITY - ADULT  Goal: Maintain or return to baseline ADL function  Description: INTERVENTIONS:  -  Assess patient's ability to carry out ADLs; assess patient's baseline for ADL function and identify physical deficits which impact ability to perform ADLs (bathing, care of mouth/teeth, toileting, grooming, dressing, etc )  - Assess/evaluate cause of self-care deficits   - Assess range of motion  - Assess patient's mobility; develop plan if impaired  - Assess patient's need for assistive devices and provide as appropriate  - Encourage maximum independence but intervene and supervise when necessary  - Involve family in performance of ADLs  - Assess for home care needs following discharge   - Consider OT consult to assist with ADL evaluation and planning for discharge  - Provide patient education as appropriate  Outcome: Progressing  Goal: Maintains/Returns to pre admission functional level  Description: INTERVENTIONS:  - Perform BMAT or MOVE assessment daily    - Set and communicate daily mobility goal to care team and patient/family/caregiver     - Collaborate with rehabilitation services on mobility goals if consulted  - Record patient progress and toleration of activity level   Outcome: Progressing     Problem: PAIN - ADULT  Goal: Verbalizes/displays adequate comfort level or baseline comfort level  Description: Interventions:  - Encourage patient to monitor pain and request assistance  - Assess pain using appropriate pain scale  - Administer analgesics based on type and severity of pain and evaluate response  - Implement non-pharmacological measures as appropriate and evaluate response  - Consider cultural and social influences on pain and pain management  - Notify physician/advanced practitioner if interventions unsuccessful or patient reports new pain  Outcome: Progressing     Problem: INFECTION - ADULT  Goal: Absence or prevention of progression during hospitalization  Description: INTERVENTIONS:  - Assess and monitor for signs and symptoms of infection  - Monitor lab/diagnostic results  - Monitor all insertion sites, i e  indwelling lines, tubes, and drains  - Monitor endotracheal if appropriate and nasal secretions for changes in amount and color  - [place] appropriate cooling/warming therapies per order  - Administer medications as ordered  - Instruct and encourage patient and family to use good hand hygiene technique  - Identify and instruct in appropriate isolation precautions for identified infection/condition  Outcome: Progressing  Goal: Absence of fever/infection during neutropenic period  Description: INTERVENTIONS:  - Monitor WBC    Outcome: Progressing     Problem: SAFETY ADULT  Goal: Patient will remain free of falls  Description: INTERVENTIONS:  - Educate patient/family on patient safety including physical limitations  - Instruct patient to call for assistance with activity   - Consult OT/PT to assist with strengthening/mobility   - Keep Call bell within reach  - Keep bed low and locked with side rails adjusted as appropriate  - Keep care items and personal belongings within reach  - Initiate and maintain comfort rounds  - Make Fall Risk Sign visible to staff  - Apply yellow socks and bracelet for high fall risk patients  - Consider moving patient to room near nurses station  Outcome: Progressing  Goal: Maintain or return to baseline ADL function  Description: INTERVENTIONS:  -  Assess patient's ability to carry out ADLs; assess patient's baseline for ADL function and identify physical deficits which impact ability to perform ADLs (bathing, care of mouth/teeth, toileting, grooming, dressing, etc )  - Assess/evaluate cause of self-care deficits   - Assess range of motion  - Assess patient's mobility; develop plan if impaired  - Assess patient's need for assistive devices and provide as appropriate  - Encourage maximum independence but intervene and supervise when necessary  - Involve family in performance of ADLs  - Assess for home care needs following discharge   - Consider OT consult to assist with ADL evaluation and planning for discharge  - Provide patient education as appropriate  Outcome: Progressing  Goal: Maintains/Returns to pre admission functional level  Description: INTERVENTIONS:  - Perform BMAT or MOVE assessment daily    - Set and communicate daily mobility goal to care team and patient/family/caregiver     - Collaborate with rehabilitation services on mobility goals if consulted  - Record patient progress and toleration of activity level   Outcome: Progressing     Problem: DISCHARGE PLANNING  Goal: Discharge to home or other facility with appropriate resources  Description: INTERVENTIONS:  - Identify barriers to discharge w/patient and caregiver  - Arrange for needed discharge resources and transportation as appropriate  - Identify discharge learning needs (meds, wound care, etc )  - Arrange for interpretive services to assist at discharge as needed  - Refer to Case Management Department for coordinating discharge planning if the patient needs post-hospital services based on physician/advanced practitioner order or complex needs related to functional status, cognitive ability, or social support system  Outcome: Progressing     Problem: Knowledge Deficit  Goal: Patient/family/caregiver demonstrates understanding of disease process, treatment plan, medications, and discharge instructions  Description: Complete learning assessment and assess knowledge base  Interventions:  - Provide teaching at level of understanding  - Provide teaching via preferred learning methods  Outcome: Progressing     Problem: Nutrition/Hydration-ADULT  Goal: Nutrient/Hydration intake appropriate for improving, restoring or maintaining nutritional needs  Description: Monitor and assess patient's nutrition/hydration status for malnutrition  Collaborate with interdisciplinary team and initiate plan and interventions as ordered  Monitor patient's weight and dietary intake as ordered or per policy  Utilize nutrition screening tool and intervene as necessary  Determine patient's food preferences and provide high-protein, high-caloric foods as appropriate       INTERVENTIONS:  - Monitor oral intake, urinary output, labs, and treatment plans  - Assess nutrition and hydration status and recommend course of action  - Evaluate amount of meals eaten  - Assist patient with eating if necessary   - Allow adequate time for meals  - Recommend/ encourage appropriate diets, oral nutritional supplements, and vitamin/mineral supplements  - Order, calculate, and assess calorie counts as needed  - Recommend, monitor, and adjust tube feedings and TPN/PPN based on assessed needs  - Assess need for intravenous fluids  - Provide specific nutrition/hydration education as appropriate  - Include patient/family/caregiver in decisions related to nutrition  Outcome: Progressing     Problem: Prexisting or High Potential for Compromised Skin Integrity  Goal: Skin integrity is maintained or improved  Description: INTERVENTIONS:  - Identify patients at risk for skin breakdown  - Assess and monitor skin integrity  - Assess and monitor nutrition and hydration status  - Monitor labs   - Assess for incontinence - Turn and reposition patient  - Assist with mobility/ambulation  - Relieve pressure over bony prominences  - Avoid friction and shearing  - Provide appropriate hygiene as needed including keeping skin clean and dry  - Evaluate need for skin moisturizer/barrier cream  - Collaborate with interdisciplinary team   - Patient/family teaching  - Consider wound care consult   Outcome: Progressing

## 2022-07-10 NOTE — PROGRESS NOTES
[address]  64   Progress Note Danilo Calabrese 1955, 74 y o  male MRN: [id number]  Unit/Bed#: ICU 11-01 Encounter: [id number]  Primary Care Provider: Ayesha Smith DO   Date and time admitted to hospital: 7/7/2022 12:44 PM    * Diabetic infection of left foot [facility])  Assessment & Plan  · Will continue antibiotics with IV vancomycin  · Podiatry consulted ; appreciate recommendations  · Follow-up bone scan  · Further treatment as above    Acute respiratory failure with hypoxia ([address] )  Assessment & Plan  · Requiring 2 L of nasal cannula supplemental O2 this morning  · Possibly secondary to volume overload  · Wean O2 as tolerated  · Nephrology following ; appreciate recommendations regarding possible diuresis  · Goal SpO2 > 90%    Severe sepsis with lactic acidosis ([address] )  Assessment & Plan  · Resolved with IV fluid resuscitation and broad-spectrum IV antibiotics  · Secondary to diabetic foot infection present on admission with fever and leukocytosis  · Lactic acidosis resolved  · Will follow-up bone scan of left foot  · Will continue IV antibiotics with vancomycin  · Infectious Disease recommended discontinuing cefepime  · Trend procalcitonin  · Blood cultures positive for gram-positive cocci ; repeat blood cultures pending  · Podiatry consultation    Gram-positive bacteremia  Assessment & Plan  · Source likely left diabetic toe ulcer  · Likely MSSA  · Repeat blood cultures pending  · Continue vancomycin ; will likely deescalate to cefazolin however will await Infectious Disease recommendations  · Follow-up repeat blood cultures    Type 2 diabetes mellitus with chronic kidney disease, with long-term current use of insulin ([facility])  Assessment & Plan  · Will monitor fingersticks  · Insulin sliding scale  · Lantus 10 units at bedtime  · Diabetic diet  · Will adjust diabetic regimen as needed    Persistent atrial fibrillation ([address] )  Assessment & Plan  · Will continue Lopressor  · Eliquis held in case patient requires surgical intervention  · If no plan for surgery will resume Eliquis    ESRD (end stage renal disease) [facility])  Assessment & Plan  Lab Results   Component Value Date    EGFR 14 07/10/2022    EGFR 17 2022    EGFR 13 2022    CREATININE 4 06 (H) 07/10/2022    CREATININE 3 46 (H) 2022    CREATININE 4 17 (H) 2022   · ESRD on HD Monday/Wednesday/Friday  · Nephrology following ; appreciate recommendations  · 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, sevelamer    Colon cancer [facility])  Assessment & Plan  · Patient follows with Hematology/Oncology  · Last chemo session was on 2022    VTE Pharmacologic Prophylaxis:  Eliquis    Patient Centered Rounds:  Patient care rounds were performed with nursing    Discussions with Specialists or Other Care Team Provider:  Case Management, nursing, PT/OT    Education and Discussions with Family / Patient:  Spoke with patient at bedside    Time Spent for Care: 30  More than 50% of total time spent on counseling and coordination of care as described above  Current Length of Stay: 3 day(s)    Current Patient Status: Inpatient     Certification Statement: The patient will continue to require additional inpatient hospital stay due to diabetic infection of left foot complicated by severe sepsis    Discharge Plan:  Pending PT/OT evaluation and treatment    Code Status: Level 1 - Full Code      Subjective:   Patient seen and examined at bedside  No acute events overnight  Denies chest pain, SOB, diaphoresis, nausea/vomiting/diarrhea, fevers/chills  Objective:     Vitals:   Temp (24hrs), Av 1 °F (36 7 °C), Min:97 3 °F (36 3 °C), Max:99 3 °F (37 4 °C)    Temp:  [97 3 °F (36 3 °C)-99 3 °F (37 4 °C)] 97 3 °F (36 3 °C)  HR:  [60-67] 60  Resp:  [21-31] 27  BP: (105-124)/(53-60) 121/60  SpO2:  [87 %-95 %] 94 %  Body mass index is 30 49 kg/m²  Input and Output Summary (last 24 hours):        Intake/Output Summary (Last 24 hours) at 7/10/2022 5041  Last data filed at 7/10/2022 0800  Gross per 24 hour   Intake 710 ml   Output 950 ml   Net -240 ml       Physical Exam:     Physical Exam  Vitals and nursing note reviewed  Constitutional:       Appearance: He is well-developed  HENT:      Head: Normocephalic and atraumatic  Eyes:      Conjunctiva/sclera: Conjunctivae normal    Cardiovascular:      Rate and Rhythm: Normal rate and regular rhythm  Heart sounds: No murmur heard  Pulmonary:      Effort: Pulmonary effort is normal  No respiratory distress  Breath sounds: Normal breath sounds  Abdominal:      Palpations: Abdomen is soft  Tenderness: There is no abdominal tenderness  Musculoskeletal:      Cervical back: Neck supple  Comments: Ulcer of left foot   Skin:     General: Skin is warm and dry  Neurological:      Mental Status: He is alert  Additional Data:     Labs: I have reviewed pertinent results     Results from last 7 days   Lab Units 07/10/22  0547 07/09/22  0542 07/08/22  0447   WBC Thousand/uL 14 79*   < > 16 91*   HEMOGLOBIN g/dL 8 2*   < > 8 2*   HEMATOCRIT % 25 5*   < > 25 4*   PLATELETS Thousands/uL 202   < > 172   BANDS PCT %  --   --  3   NEUTROS PCT % 80*   < >  --    LYMPHS PCT % 9*   < >  --    LYMPHO PCT %  --   --  2*   MONOS PCT % 8   < >  --    MONO PCT %  --   --  5   EOS PCT % 2   < > 0    < > = values in this interval not displayed       Results from last 7 days   Lab Units 07/10/22  0547 07/09/22  0542 07/08/22  0447 07/07/22  1300   SODIUM mmol/L 128*   < > 129* 130*   POTASSIUM mmol/L 3 7   < > 4 3 4 8   CHLORIDE mmol/L 93*   < > 95* 90*   CO2 mmol/L 26   < > 25 28   BUN mg/dL 55*   < > 45* 37*   CREATININE mg/dL 4 06*   < > 4 17* 3 53*   ANION GAP mmol/L 9   < > 9 12   CALCIUM mg/dL 8 2*   < > 8 4 8 9   ALBUMIN g/dL  --   --  3 0* 3 5   TOTAL BILIRUBIN mg/dL  --   --   --  1 10*   ALK PHOS U/L  --   --   --  93   ALT U/L  --   --   --  10   AST U/L  --   --   --  9*   GLUCOSE RANDOM mg/dL 136   < > 127 182*    < > = values in this interval not displayed  Results from last 7 days   Lab Units 07/07/22  1300   INR  2 15*     Results from last 7 days   Lab Units 07/10/22  0720 07/09/22  2107 07/09/22  1609 07/09/22  1123 07/09/22  0706 07/08/22  2208 07/08/22  1808 07/08/22  1648 07/08/22  1627 07/08/22  1217 07/08/22  0622 07/07/22  2117   POC GLUCOSE mg/dl 139 217* 238* 185* 136 206* 130 161* 62* 131 131 177*         Results from last 7 days   Lab Units 07/08/22  0447 07/07/22  1646 07/07/22  1300   LACTIC ACID mmol/L  --  1 9 3 2*   PROCALCITONIN ng/ml 1 72*  --  0 92*         Imaging: I have reviewed pertinent imaging       Recent Cultures (last 7 days):     Results from last 7 days   Lab Units 07/09/22  0929 07/09/22  0542 07/07/22  1311 07/07/22  1300   BLOOD CULTURE  Received in Microbiology Lab  Culture in Progress  Received in Microbiology Lab  Culture in Progress   Staphylococcus aureus* Staphylococcus aureus*   GRAM STAIN RESULT   --   --  Gram positive cocci in pairs, chains and clusters* Gram positive cocci in pairs, chains and clusters*       Last 24 Hours Medication List:   Current Facility-Administered Medications   Medication Dose Route Frequency Provider Last Rate    acetaminophen  650 mg Oral Q6H PRN Neli Martinez PA-C      amLODIPine  10 mg Oral Daily Cindy Molina MD      apixaban  5 mg Oral BID Cindy Molina MD      insulin glargine  10 Units Subcutaneous HS Cindy Molina MD      insulin lispro  1-6 Units Subcutaneous TID [facility] Mikaela Brooks MD      loperamide  2 mg Oral Q4H PRN Tamaraatif Pride DO      midodrine  5 mg Oral Once per day on Mon Wed Fri Cindy Molina MD      sevelamer  800 mg Oral TID With Meals Cindy Molina MD      tamsulosin  0 4 mg Oral Daily With Hamida Beverly MD      torsemide  80 mg Oral Once per day on Sun Tue Thu Sat Cindy Molina MD      vancomycin  10 mg/kg Intravenous Once per day on Mon Wed Fri Kelvin Anglin MD Stopped (07/09/22 2100)        Today, Patient Was Seen By: Yuki Valverde DO    ** Please Note: Dictation voice to text software may have been used in the creation of this document   **

## 2022-07-10 NOTE — ASSESSMENT & PLAN NOTE
Lab Results   Component Value Date    EGFR 14 07/10/2022    EGFR 17 07/09/2022    EGFR 13 07/08/2022    CREATININE 4 06 (H) 07/10/2022    CREATININE 3 46 (H) 07/09/2022    CREATININE 4 17 (H) 07/08/2022   · ESRD on HD Monday/Wednesday/Friday  · Nephrology following ; appreciate recommendations  · 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, sevelamer

## 2022-07-11 ENCOUNTER — APPOINTMENT (INPATIENT)
Dept: DIALYSIS | Facility: HOSPITAL | Age: 67
DRG: 853 | End: 2022-07-11
Attending: INTERNAL MEDICINE
Payer: MEDICARE

## 2022-07-11 ENCOUNTER — APPOINTMENT (INPATIENT)
Dept: NON INVASIVE DIAGNOSTICS | Facility: HOSPITAL | Age: 67
DRG: 853 | End: 2022-07-11
Payer: MEDICARE

## 2022-07-11 LAB
ANION GAP SERPL CALCULATED.3IONS-SCNC: 10 MMOL/L (ref 4–13)
BACTERIA UR QL AUTO: NORMAL /HPF
BASOPHILS # BLD AUTO: 0.04 THOUSANDS/ΜL (ref 0–0.1)
BASOPHILS NFR BLD AUTO: 0 % (ref 0–1)
BILIRUB UR QL STRIP: NEGATIVE
BUN SERPL-MCNC: 62 MG/DL (ref 5–25)
CALCIUM SERPL-MCNC: 8.3 MG/DL (ref 8.4–10.2)
CHLORIDE SERPL-SCNC: 95 MMOL/L (ref 96–108)
CLARITY UR: CLEAR
CO2 SERPL-SCNC: 26 MMOL/L (ref 21–32)
COLOR UR: YELLOW
CREAT SERPL-MCNC: 3.85 MG/DL (ref 0.6–1.3)
EOSINOPHIL # BLD AUTO: 0.68 THOUSAND/ΜL (ref 0–0.61)
EOSINOPHIL NFR BLD AUTO: 5 % (ref 0–6)
ERYTHROCYTE [DISTWIDTH] IN BLOOD BY AUTOMATED COUNT: 16.8 % (ref 11.6–15.1)
GFR SERPL CREATININE-BSD FRML MDRD: 15 ML/MIN/1.73SQ M
GLUCOSE SERPL-MCNC: 138 MG/DL (ref 65–140)
GLUCOSE SERPL-MCNC: 157 MG/DL (ref 65–140)
GLUCOSE SERPL-MCNC: 174 MG/DL (ref 65–140)
GLUCOSE SERPL-MCNC: 212 MG/DL (ref 65–140)
GLUCOSE SERPL-MCNC: 231 MG/DL (ref 65–140)
GLUCOSE SERPL-MCNC: 292 MG/DL (ref 65–140)
GLUCOSE UR STRIP-MCNC: ABNORMAL MG/DL
HCT VFR BLD AUTO: 25.7 % (ref 36.5–49.3)
HGB BLD-MCNC: 8.4 G/DL (ref 12–17)
HGB UR QL STRIP.AUTO: ABNORMAL
IMM GRANULOCYTES # BLD AUTO: 0.21 THOUSAND/UL (ref 0–0.2)
IMM GRANULOCYTES NFR BLD AUTO: 2 % (ref 0–2)
KETONES UR STRIP-MCNC: NEGATIVE MG/DL
LEUKOCYTE ESTERASE UR QL STRIP: NEGATIVE
LYMPHOCYTES # BLD AUTO: 0.99 THOUSANDS/ΜL (ref 0.6–4.47)
LYMPHOCYTES NFR BLD AUTO: 8 % (ref 14–44)
MAGNESIUM SERPL-MCNC: 1.9 MG/DL (ref 1.9–2.7)
MCH RBC QN AUTO: 32.9 PG (ref 26.8–34.3)
MCHC RBC AUTO-ENTMCNC: 32.7 G/DL (ref 31.4–37.4)
MCV RBC AUTO: 101 FL (ref 82–98)
MONOCYTES # BLD AUTO: 1.1 THOUSAND/ΜL (ref 0.17–1.22)
MONOCYTES NFR BLD AUTO: 9 % (ref 4–12)
NEUTROPHILS # BLD AUTO: 9.73 THOUSANDS/ΜL (ref 1.85–7.62)
NEUTS SEG NFR BLD AUTO: 76 % (ref 43–75)
NITRITE UR QL STRIP: NEGATIVE
NON-SQ EPI CELLS URNS QL MICRO: NORMAL /HPF
NRBC BLD AUTO-RTO: 0 /100 WBCS
PH UR STRIP.AUTO: 7.5 [PH]
PHOSPHATE SERPL-MCNC: 3.1 MG/DL (ref 2.3–4.1)
PLATELET # BLD AUTO: 200 THOUSANDS/UL (ref 149–390)
PMV BLD AUTO: 10.4 FL (ref 8.9–12.7)
POTASSIUM SERPL-SCNC: 3.7 MMOL/L (ref 3.5–5.3)
PROT UR STRIP-MCNC: ABNORMAL MG/DL
RBC # BLD AUTO: 2.55 MILLION/UL (ref 3.88–5.62)
RBC #/AREA URNS AUTO: NORMAL /HPF
SODIUM SERPL-SCNC: 131 MMOL/L (ref 135–147)
SP GR UR STRIP.AUTO: 1.02 (ref 1–1.03)
UROBILINOGEN UR QL STRIP.AUTO: 0.2 E.U./DL
VANCOMYCIN SERPL-MCNC: 11.5 UG/ML (ref 10–20)
WBC # BLD AUTO: 12.75 THOUSAND/UL (ref 4.31–10.16)
WBC #/AREA URNS AUTO: NORMAL /HPF

## 2022-07-11 PROCEDURE — 83735 ASSAY OF MAGNESIUM: CPT | Performed by: INTERNAL MEDICINE

## 2022-07-11 PROCEDURE — 80202 ASSAY OF VANCOMYCIN: CPT | Performed by: INTERNAL MEDICINE

## 2022-07-11 PROCEDURE — 85025 COMPLETE CBC W/AUTO DIFF WBC: CPT | Performed by: INTERNAL MEDICINE

## 2022-07-11 PROCEDURE — 81001 URINALYSIS AUTO W/SCOPE: CPT | Performed by: INTERNAL MEDICINE

## 2022-07-11 PROCEDURE — 90935 HEMODIALYSIS ONE EVALUATION: CPT | Performed by: INTERNAL MEDICINE

## 2022-07-11 PROCEDURE — 84100 ASSAY OF PHOSPHORUS: CPT | Performed by: INTERNAL MEDICINE

## 2022-07-11 PROCEDURE — 80048 BASIC METABOLIC PNL TOTAL CA: CPT | Performed by: INTERNAL MEDICINE

## 2022-07-11 PROCEDURE — 5A1D70Z PERFORMANCE OF URINARY FILTRATION, INTERMITTENT, LESS THAN 6 HOURS PER DAY: ICD-10-PCS | Performed by: INTERNAL MEDICINE

## 2022-07-11 PROCEDURE — 82948 REAGENT STRIP/BLOOD GLUCOSE: CPT

## 2022-07-11 PROCEDURE — NC001 PR NO CHARGE: Performed by: INTERNAL MEDICINE

## 2022-07-11 PROCEDURE — 99232 SBSQ HOSP IP/OBS MODERATE 35: CPT | Performed by: INTERNAL MEDICINE

## 2022-07-11 RX ORDER — CEFAZOLIN SODIUM 1 G/50ML
1000 SOLUTION INTRAVENOUS EVERY 24 HOURS
Status: DISCONTINUED | OUTPATIENT
Start: 2022-07-11 | End: 2022-07-17 | Stop reason: HOSPADM

## 2022-07-11 RX ADMIN — APIXABAN 5 MG: 5 TABLET, FILM COATED ORAL at 09:10

## 2022-07-11 RX ADMIN — MIDODRINE HYDROCHLORIDE 5 MG: 5 TABLET ORAL at 09:10

## 2022-07-11 RX ADMIN — AMLODIPINE BESYLATE 10 MG: 10 TABLET ORAL at 22:04

## 2022-07-11 RX ADMIN — CEFAZOLIN SODIUM 1000 MG: 1 SOLUTION INTRAVENOUS at 16:14

## 2022-07-11 RX ADMIN — SEVELAMER HYDROCHLORIDE 800 MG: 800 TABLET, FILM COATED PARENTERAL at 11:35

## 2022-07-11 RX ADMIN — SEVELAMER HYDROCHLORIDE 800 MG: 800 TABLET, FILM COATED PARENTERAL at 08:10

## 2022-07-11 RX ADMIN — INSULIN LISPRO 1 UNITS: 100 INJECTION, SOLUTION INTRAVENOUS; SUBCUTANEOUS at 08:04

## 2022-07-11 RX ADMIN — INSULIN LISPRO 3 UNITS: 100 INJECTION, SOLUTION INTRAVENOUS; SUBCUTANEOUS at 16:21

## 2022-07-11 RX ADMIN — TAMSULOSIN HYDROCHLORIDE 0.4 MG: 0.4 CAPSULE ORAL at 16:18

## 2022-07-11 RX ADMIN — INSULIN LISPRO 1 UNITS: 100 INJECTION, SOLUTION INTRAVENOUS; SUBCUTANEOUS at 11:33

## 2022-07-11 RX ADMIN — SEVELAMER HYDROCHLORIDE 800 MG: 800 TABLET, FILM COATED PARENTERAL at 16:18

## 2022-07-11 RX ADMIN — INSULIN GLARGINE 10 UNITS: 100 INJECTION, SOLUTION SUBCUTANEOUS at 22:04

## 2022-07-11 RX ADMIN — APIXABAN 5 MG: 5 TABLET, FILM COATED ORAL at 17:44

## 2022-07-11 NOTE — WOUND OSTOMY CARE
Consult Note - Wound   Garwin Brunner 74 y o  male MRN: [id number]  Unit/Bed#: ICU 11-01 Encounter: [id number]      History and Present Illness:  74year old male patient admitted with diabetic infection of the left foot  Wound care consulted for left lower arm wounds  Podiatry consulted and saw patient for foot wounds  Patient history significant for a-fib, DDM2, colon CA, ESRD on dialysis, PAD, amputation of right and left foot digits, CHF, and s/p chemotherapy  Assessment Findings:   Patient in bed for assessment, he is currently having dialysis  Dressing to left foot with drainage noted  Per RN, patient buttocks and sacrum intact  Patient is awake, alert and oriented, he is able to reposition himself in bed  Patient is not incontinent  He has limited range of motion to the lower extremities  He has nutritional supplements  1  Right heel blanchable erythema, Allevyn applied  Left heel beneath dressing, did not assess  2  Left mid lower and distal lower arm on medial side, two areas of eschar  Patient states these area are from when he had fistula placed recently  Unsure of origin of wounds  At this time the eschars are intact, mild pink periwound to both areas  3M No Sting to be applied to keep eschars intact    Skin and Wound Care Plan:   1  Lower extremity wound care per podiatry  2  Apply skin nourishing cream to the skin daily  3  Elevate heels off of bed with pillows to offload  4  Apply hydraguard to buttocks and sacrum BID and PRN  5  Turn and reposition patient Q2 hours  6  Allevyn life silicone bordered foam dressing to the right heel, won with P, date, peel back daily for skin assessment, reapply dressing, change every 3 days or PRN  7  Apply 3M No Sting the intact scabs on the left lower arm daily      Wounds:      Wound 07/08/22  Arm Anterior;Left;Lower (Active)   Wound Image   07/11/22 1201   Wound Description Dry; Intact; Brown 07/11/22 1201   Wound Length (cm) 1 5 cm 07/08/22 2206   Wound Width (cm) 1 cm 07/08/22 2206   Wound Surface Area (cm^2) 1 5 cm^2 07/08/22 2206   Drainage Amount None 07/11/22 1201   Dressing Protective barrier 07/11/22 1201   Patient Tolerance Tolerated well 07/11/22 1201       Wound 07/08/22  Arm Anterior;Distal;Left;Lower (Active)   Wound Image   07/11/22 1201   Wound Description Dry; Intact 07/11/22 1202   Wound Length (cm) 1 cm 07/08/22 2206   Wound Width (cm) 1 cm 07/08/22 2206   Wound Surface Area (cm^2) 1 cm^2 07/08/22 2206   Drainage Amount None 07/11/22 1201   Dressing Protective barrier 07/11/22 1201   Patient Tolerance Tolerated well 07/11/22 1201     Reviewed plan of care with primary RN [facility]  Recommendations written as orders  Wound care team to follow weekly while admitted  Questions or concerns [address] [facility] Nurse    Mikaela AVILEZN, RN, [employer]

## 2022-07-11 NOTE — CASE MANAGEMENT
Case Management Assessment    Patient name Keshia Graves  Location ICU 11/ICU  MRN [id number]  : 1955 Date 2022       Current Admission Date: 2022  Current Admission Diagnosis:Diabetic infection of left foot [facility])   Patient Active Problem List    Diagnosis Date Noted    Gram-positive bacteremia 2022    Acute respiratory failure with hypoxia ([facility] ) 2022    ESRD (end stage renal disease) ([facility] ) 2022    Diabetic infection of left foot ([facility] ) 2022    Severe sepsis with lactic acidosis ([place] [address] ) 2022    S/P arteriovenous (AV) fistula creation 2022    Pulmonary hypertension ([place] [address] )     Other iron deficiency anemias 04/15/2022    Acute on chronic diastolic congestive heart failure ([facility] ) 2022    Chemotherapy-induced nausea 2022    Chemotherapy-induced neutropenia ([facility] ) 2022    Acute kidney injury superimposed on chronic kidney disease ([facility] ) 2021    RSV (respiratory syncytial virus pneumonia) 2021    Colon cancer ([place] [address] ) 10/23/2021    Absence of toe ([place] [address] ) 2021    Chronic obstructive pulmonary disease ([facility] ) 2020    SSS (sick sinus syndrome) ([facility] ) 2020    Onychomycosis 2020    Chronic painful diabetic neuropathy ([place] [address] ) 2020    Renovascular hypertension     Multiple drug resistant organism (MDRO) culture positive 2020    Amputation of left great toe ([facility] ) 2020    Osteomyelitis of left foot ([facility] ) 2020    Hyponatremia 2020    Iron deficiency anemia 2020    Anxiety 2020    Staphylococcus aureus bacteremia 2020    Elevated troponin I level 2020    Type 2 diabetes mellitus with diabetic neuropathy, without long-term current use of insulin ([facility] ) 2020    S/P amputation of lesser toe, left ([facility] ) 2019    S/P amputation of lesser toe, right ([facility] ) 2019    Left renal artery stenosis (HCC) 2019    Chronic diastolic (congestive) heart failure ([facility] ) 06/05/2019    Acute pulmonary edema (HCC)     Hyperkalemia 05/16/2019    Abnormal CT of the chest 05/15/2019    Mitral stenosis     Anemia 05/02/2019    Fever 05/02/2019    Nonrheumatic aortic valve stenosis 11/27/2018    Type 2 diabetes mellitus with chronic kidney disease, with long-term current use of insulin ([facility] ) 11/14/2018    Hypertension 11/14/2018    Stage 4 chronic kidney disease ([place] [address] ) 11/14/2018    NICOLASA (obstructive sleep apnea) 11/11/2018    Urinary retention 11/06/2018    Persistent proteinuria 11/05/2018    Volume overload 11/05/2018    Persistent atrial fibrillation ([place] [address] ) 11/04/2018    Complete heart block (HCC) 26/16/3178    Metabolic acidosis 55/21/1732    ELZBIETA (acute kidney injury) ([place] [address] ) 11/01/2018    Other hyperlipidemia 11/01/2018    Easy bruising 09/27/2017    Peripheral arterial disease ([place] [address] ) 09/06/2017    Vertigo 08/16/2017    Diabetic ulcer of toe of left foot associated with type 2 diabetes mellitus ([place] [address] ) 03/18/2017    Eczema 12/11/2015    PVCs (premature ventricular contractions) 37/08/0831    Systolic murmur 29/82/7025    Bilateral leg edema 07/09/2015    Erectile dysfunction of non-organic origin 04/24/2012    Hyperlipidemia 04/24/2012    Uremic acidosis 04/24/2012    Arthritis 04/24/2012    Rhinitis 04/24/2012      LOS (days): 4  Geometric Mean LOS (GMLOS) (days): 3 50  Days to GMLOS:-0 5     OBJECTIVE:    Risk of Unplanned Readmission Score: 39 23     Current admission status: Inpatient  Preferred Pharmacy:   [id number], [place] - [address]  [address]  [address]  Phone: [phone] Fax: [phone]    Primary Care Provider: Joann Cotto DO    Primary Insurance: MEDICARE  Secondary Insurance: AARP    ASSESSMENT:  [address], [address] [facility] Representative - Spouse   Primary Phone: [phone] (Mobile)               Advance Directives  Does patient have a Health Care POA?: Yes  Does patient have Advance Directives?: Yes  Advance Directives: Living will, Power of  for health care  Primary Contact: Todd Huffman wife, provided paperwork on admission  Readmission Root Cause  30 Day Readmission: No    Patient Information  Admitted from[de-identified] Home  Mental Status: Alert  During Assessment patient was accompanied by: Spouse  Assessment information provided by[de-identified] Patient  Primary Caregiver: Self  Support Systems: Spouse/significant other  [place] of Residence: [facility] do you live in?: [address] entry access options  Select all that apply : Stairs  Number of steps to enter home  : 1  Do the steps have railings?: No  Type of Current Residence: [address] home  Upon entering residence, is there a bedroom on the main floor (no further steps)?: Yes  Upon entering residence, is there a bathroom on the main floor (no further steps)?: Yes  In the last 12 months, was there a time when you were not able to pay the mortgage or rent on time?: No  In the last 12 months, how many places have you lived?: 1  In the last 12 months, was there a time when you did not have a steady place to sleep or slept in a shelter (including now)?: No  Homeless/housing insecurity resource given?: N/A  Living Arrangements: Lives w/ Spouse/significant other  Is patient a ?: No    Activities of Daily Living Prior to Admission  Functional Status: Independent  Completes ADLs independently?: Yes  Ambulates independently?: Yes  Does patient use assisted devices?: No  Does patient currently own DME?: Yes  What DME does the patient currently own?: Teddy Moralez  Does patient have a history of Outpatient Therapy (PT/OT)?: No  Does the patient have a history of Short-Term Rehab?: No  Does patient have a history of HHC?: No  Does patient currently have [facility]?: No  Patient Information Continued  Income Source: Pension/longterm  Does patient have prescription coverage?: Yes  Within the past 12 months, you worried that your food would run out before you got the money to buy more : Never true  Within the past 12 months, the food you bought just didn't last and you didn't have money to get more : Never true  Food insecurity resource given?: N/A  Does patient receive dialysis treatments?: Yes ([facility] MOn-wed-Fri)  Does patient have a history of substance abuse?: No  Does patient have a history of Mental Health Diagnosis?: No    PHQ 2/9 Screening   Reviewed PHQ 2/9 Depression Screening Score?: No    Means of Transportation  Means of Transport to Appts[de-identified] Drives Self  In the past 12 months, has lack of transportation kept you from medical appointments or from getting medications?: No  In the past 12 months, has lack of transportation kept you from meetings, work, or from getting things needed for daily living?: No  Was application for public transport provided?: N/A   Active with [facility] M-W-F  Drives to dialysis

## 2022-07-11 NOTE — PROGRESS NOTES
Progress Note - Nephrology   Trevor English 74 y o  male MRN: [id number]  Unit/Bed#: ICU 11-01 Encounter: [id number]    A/P:  1  ESRD hemodialysis dependent   - HEMODIALYSIS PROCEDURE NOTE  The patient was seen and examined on hemodialysis  Time: 3 5 hours  Sodium: 138 Blood flow: 400   Dialyzer: F160 Potassium: 4 Dialysate flow: 600   Access: PermCath Bicarbonate: 35 Ultrafiltration goal: 1   Medications on HD: none   /79   Patient tolerating treatment well  Next treatment Wed July 13, 2022    2  Type 2 diabetes mellitus with chronic kidney disease with long-term current use of insulin   - sugars being monitored and covered    3  Severe sepsis with lactic acidosis   - blood cultures grew Staph aureus due to diabetic toe ulder   - he is receiving vancomycin 1500 mg 3 times weekly   - white blood count has declined to 12 75 today    4  Persistent atrial fibrillation   - has good rate control on metoprolol and has anticoagulation with Eliquis    5  Anemia of chronic kidney disease   - continue monitoring and transfuse for hemoglobin less than 7 5   - hold Epogen due to colon cancer   - Defer iron due to infection    6   Secondary hyperparathyroidism of renal origin   - phosphorus is 3 1-no binders indicated      Follow up reason for today's visit: ESRD hemodialysis dependent MWF at [address]    Diabetic infection of left foot [facility])    Patient Active Problem List   Diagnosis    Bilateral leg edema    Diabetic ulcer of toe of left foot associated with type 2 diabetes mellitus ([facility] )    Easy bruising    Eczema    Erectile dysfunction of non-organic origin    Hyperlipidemia    Uremic acidosis    Arthritis    Peripheral arterial disease (HCC)    PVCs (premature ventricular contractions)    Rhinitis    Systolic murmur    Vertigo    Complete heart block (HCC)    Metabolic acidosis    ELZBIETA (acute kidney injury) ([facility] )    Other hyperlipidemia    Persistent atrial fibrillation (HCC)    Persistent proteinuria    Volume overload    Urinary retention    NICOLASA (obstructive sleep apnea)    Type 2 diabetes mellitus with chronic kidney disease, with long-term current use of insulin (HCC)    Hypertension    Stage 4 chronic kidney disease (HCC)    Nonrheumatic aortic valve stenosis    Anemia    Fever    Mitral stenosis    Abnormal CT of the chest    Hyperkalemia    Acute pulmonary edema (HCC)    Chronic diastolic (congestive) heart failure (HCC)    Left renal artery stenosis (HCC)    S/P amputation of lesser toe, left (HCC)    S/P amputation of lesser toe, right (HCC)    Elevated troponin I level    Staphylococcus aureus bacteremia    Type 2 diabetes mellitus with diabetic neuropathy, without long-term current use of insulin (HCC)    Hyponatremia    Iron deficiency anemia    Anxiety    Osteomyelitis of left foot (HCC)    Amputation of left great toe (HCC)    Multiple drug resistant organism (MDRO) culture positive    Renovascular hypertension    SSS (sick sinus syndrome) (HCC)    Chronic obstructive pulmonary disease (HCC)    Absence of toe (HCC)    Chronic painful diabetic neuropathy (HCC)    Onychomycosis    Colon cancer (HCC)    Acute kidney injury superimposed on chronic kidney disease (HCC)    RSV (respiratory syncytial virus pneumonia)    Chemotherapy-induced neutropenia (HCC)    Chemotherapy-induced nausea    Acute on chronic diastolic congestive heart failure (HCC)    Other iron deficiency anemias    Pulmonary hypertension (HCC)    S/P arteriovenous (AV) fistula creation    Severe sepsis with lactic acidosis (HCC)    Diabetic infection of left foot (HCC)    Gram-positive bacteremia    Acute respiratory failure with hypoxia (HCC)    ESRD (end stage renal disease) (HCC)         Subjective:   A 10 point review of systems is negative    He is voiding clear yellow urine without difficulty and he says his urine output is increased    Objective:     Vitals: Blood pressure 149/68, pulse 60, temperature 98 °F (36 7 °C), resp  rate 20, height 5' 10" (1 778 m), weight 93 kg (205 lb 0 4 oz), SpO2 93 %  ,Body mass index is 29 42 kg/m²  Weight (last 2 days)     Date/Time Weight    07/11/22 0400 93 (205 03)    07/10/22 0543 96 4 (212 52)    07/09/22 0600 94 7 (208 78)            Intake/Output Summary (Last 24 hours) at 7/11/2022 1318  Last data filed at 7/11/2022 0930  Gross per 24 hour   Intake 450 ml   Output 0 ml   Net 450 ml     I/O last 3 completed shifts: In: 80 [P O :380; IV Piggyback:200]  Out: 750 [Urine:750]    HD Permanent Double Catheter (Active)   Reasons to continue HD Cath Treatment Therapy 07/11/22 0930   Goal for Removal N/A- chronic HD catheter 07/10/22 2000   Line Necessity Reviewed Yes, reviewed with provider 07/11/22 0930   Site Assessment Clean;Dry; Intact 07/11/22 0930   Proximal Lumen Status Blood return noted; Flushed 07/11/22 0930   Distal Lumen Status Blood return noted; Flushed 07/11/22 0930   Dressing Type Chlorhexidine dressing 07/11/22 0930   Dressing Status Clean;Dry; Intact 07/11/22 0930   Dressing Intervention Dressing changed 07/08/22 1300   Dressing Change Due 07/15/22 07/11/22 0930       Physical Exam: /68 (BP Location: Right arm)   Pulse 60   Temp 98 °F (36 7 °C)   Resp 20   Ht 5' 10" (1 778 m)   Wt 93 kg (205 lb 0 4 oz)   SpO2 93%   BMI 29 42 kg/m²     General Appearance:    Alert, cooperative, no distress, appears stated age   Head:    Normocephalic, without obvious abnormality, atraumatic   Eyes:    Conjunctiva/corneas clear   Ears:    Normal external ears   Nose:   Nares normal, septum midline, mucosa normal, no drainage    or sinus tenderness   Throat:   Lips, mucosa, and tongue normal; teeth and gums normal   Neck:   Supple, symmetrical, trachea midline, no adenopathy;        thyroid:  No enlargement/tenderness/nodules; no carotid    bruit or JVD   Back:     Symmetric, no curvature, ROM normal, no CVA tenderness   Lungs:     Clear to auscultation bilaterally, respirations unlabored   Chest wall:    No tenderness or deformity   Heart:    Irregularr rate and rhythm, S1 and S2 normal, no murmur, rub   or gallop   Abdomen:     Soft, non-tender, bowel sounds active   Extremities:   Extremities left foot wrapped   Skin:   Skin color, texture, turgor normal, no rashes or lesions   Lymph nodes:   Cervical normal   Neurologic:   CNII-XII intact            Lab, Imaging and other studies: I have personally reviewed pertinent labs  CBC:   Lab Results   Component Value Date    WBC 12 75 (H) 07/11/2022    HGB 8 4 (L) 07/11/2022    HCT 25 7 (L) 07/11/2022     (H) 07/11/2022     07/11/2022    MCH 32 9 07/11/2022    MCHC 32 7 07/11/2022    RDW 16 8 (H) 07/11/2022    MPV 10 4 07/11/2022    NRBC 0 07/11/2022     CMP:   Lab Results   Component Value Date    K 3 7 07/11/2022    CL 95 (L) 07/11/2022    CO2 26 07/11/2022    BUN 62 (H) 07/11/2022    CREATININE 3 85 (H) 07/11/2022    CALCIUM 8 3 (L) 07/11/2022    EGFR 15 07/11/2022         Results from last 7 days   Lab Units 07/11/22  0549 07/10/22  0547 07/09/22  0542 07/08/22  0447 07/07/22  1300   POTASSIUM mmol/L 3 7 3 7 4 5   < > 4 8   CHLORIDE mmol/L 95* 93* 92*   < > 90*   CO2 mmol/L 26 26 26   < > 28   BUN mg/dL 62* 55* 36*   < > 37*   CREATININE mg/dL 3 85* 4 06* 3 46*   < > 3 53*   CALCIUM mg/dL 8 3* 8 2* 8 8   < > 8 9   ALK PHOS U/L  --   --   --   --  93   ALT U/L  --   --   --   --  10   AST U/L  --   --   --   --  9*    < > = values in this interval not displayed           Phosphorus:   Lab Results   Component Value Date    PHOS 3 1 07/11/2022     Magnesium:   Lab Results   Component Value Date    MG 1 9 07/11/2022     Urinalysis: No results found for: Davi Climes, SPECGRAV, PHUR, LEUKOCYTESUR, NITRITE, PROTEINUA, GLUCOSEU, KETONESU, BILIRUBINUR, BLOODU  Ionized Calcium: No results found for: CAION  Coagulation: No results found for: PT, INR, APTT  Troponin: No results found for: TROPONINI  ABG: No results found for: PHART, QGJ1HZH, PO2ART, YIO9AMM, X0QXWPXS, BEART, SOURCE  Radiology review:     IMAGING  Procedure: XR chest portable    Result Date: 7/9/2022  Narrative: CHEST INDICATION:   hypoxia  COMPARISON:  Chest radiograph from 7/7/2022 and 4/25/2022, chest CT from 11/24/2021  EXAM PERFORMED/VIEWS:  XR CHEST PORTABLE FINDINGS:  Right port at cavoatrial junction  Right central catheter in upper right atrium  Normal heart size with left subclavian pacemaker lead in right atrium and right ventricle  Moderate pulmonary edema with left base atelectasis  No definite effusion  No pneumothorax  Skeleton normal for age  Suture anchor in left humeral head  Impression: Moderate pulmonary edema with left base atelectasis   Workstation performed: [id number]       Current Facility-Administered Medications   Medication Dose Route Frequency    acetaminophen (TYLENOL) tablet 650 mg  650 mg Oral Q6H PRN    amLODIPine (NORVASC) tablet 10 mg  10 mg Oral Daily    apixaban (ELIQUIS) tablet 5 mg  5 mg Oral BID    insulin glargine (LANTUS) subcutaneous injection 10 Units 0 1 mL  10 Units Subcutaneous HS    insulin lispro (HumaLOG) 100 units/mL subcutaneous injection 1-6 Units  1-6 Units Subcutaneous TID AC    loperamide (IMODIUM) capsule 2 mg  2 mg Oral Q4H PRN    midodrine (PROAMATINE) tablet 5 mg  5 mg Oral Once per day on Mon Wed Fri    sevelamer (RENAGEL) tablet 800 mg  800 mg Oral TID With Meals    tamsulosin (FLOMAX) capsule 0 4 mg  0 4 mg Oral Daily With Dinner    torsemide [facility]) tablet 80 mg  80 mg Oral Once per day on Sun Tue Thu Sat    vancomycin (VANCOCIN) 1500 mg in sodium chloride 0 9% 250 mL IVPB  1,500 mg Intravenous Once per day on Mon Wed Fri     Medications Discontinued During This Encounter   Medication Reason    vancomycin (VANCOCIN) 1,250 mg in sodium chloride 0 9 % 250 mL IVPB     allopurinol (ZYLOPRIM) tablet 300 mg     cefepime (MAXIPIME) IVPB (premix in dextrose) 1,000 mg 50 mL     sodium bicarbonate tablet 650 mg     vancomycin (VANCOCIN) IVPB (premix in dextrose) 1,000 mg 200 mL     apixaban (ELIQUIS) tablet 5 mg     metoprolol tartrate (LOPRESSOR) tablet 50 mg     vancomycin (VANCOCIN) IVPB (premix in dextrose) 1,000 mg 200 mL        Mendel Goes, MD      This progress note was produced in part using a dictation device which may document imprecise wording from author's original intent

## 2022-07-11 NOTE — ASSESSMENT & PLAN NOTE
Lab Results   Component Value Date    EGFR 15 07/11/2022    EGFR 14 07/10/2022    EGFR 17 07/09/2022    CREATININE 3 85 (H) 07/11/2022    CREATININE 4 06 (H) 07/10/2022    CREATININE 3 46 (H) 07/09/2022   · ESRD on HD Monday/Wednesday/Friday  · Nephrology following ; appreciate recommendations  · 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, sevelamer

## 2022-07-11 NOTE — PROGRESS NOTES
[address]  Progress Note Niki Kaufman 1955, 74 y o  male MRN: [id number]  Unit/Bed#: ICU 11-01 Encounter: [id number]  Primary Care Provider: Miguel Barton DO   Date and time admitted to hospital: 7/7/2022 12:44 PM    * Diabetic infection of left foot [facility])  Assessment & Plan  · Status post incision and drainage at the bedside with Podiatry  · Continue antibiotics with IV vancomycin  · Pharmacy consult for vancomycin trough management  · Podiatry consulted ; appreciate recommendations  · Follow-up bone scan  · Will likely need TMA as wound is probed to bone with likely underlying osteomyelitis  · Infectious Diseases following    Acute respiratory failure with hypoxia (HCC)  Assessment & Plan  · Requiring 2 L of nasal cannula supplemental O2 this morning  · Possibly secondary to volume overload  · Wean O2 as tolerated  · Nephrology following ; appreciate recommendations regarding possible diuresis  · Will remove volume during HD treatments  · Goal SpO2 > 90%    Severe sepsis with lactic acidosis ([facility] [address] )  Assessment & Plan  · Resolved with IV fluid resuscitation and broad-spectrum IV antibiotics  · Secondary to diabetic foot infection present on admission with fever and leukocytosis  · Lactic acidosis resolved  · Will follow-up bone scan of left foot  · Continue IV antibiotics with vancomycin  · Infectious Diseases following  · Trend procalcitonin  · Blood cultures positive for gram-positive cocci ; repeat blood cultures pending    Gram-positive bacteremia  Assessment & Plan  · Source likely left diabetic toe ulcer  · Likely MSSA  · Repeat blood cultures pending  · Continue vancomycin ; will likely deescalate to cefazolin however will await Infectious Disease recommendations  · Follow-up repeat blood cultures ; no growth at 24 hours    Type 2 diabetes mellitus with chronic kidney disease, with long-term current use of insulin ([facility] [address] )  Assessment & Plan  · Will monitor fingersticks  · Insulin sliding scale  · Lantus 10 units at bedtime  · Diabetic diet  · Will adjust diabetic regimen as needed    Persistent atrial fibrillation ([address] )  Assessment & Plan  · Will continue Lopressor  · Continue home Eliquis  · Will discontinue 48 hours prior to surgery    ESRD (end stage renal disease) [facility])  Assessment & Plan  Lab Results   Component Value Date    EGFR 15 2022    EGFR 14 07/10/2022    EGFR 17 2022    CREATININE 3 85 (H) 2022    CREATININE 4 06 (H) 07/10/2022    CREATININE 3 46 (H) 2022   · ESRD on HD Monday/Wednesday/Friday  · Nephrology following ; appreciate recommendations  · 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, sevelamer    Colon cancer [facility])  Assessment & Plan  · Patient follows with Hematology/Oncology  · Last chemo session was on 2022    VTE Pharmacologic Prophylaxis: Eliquis    Patient Centered Rounds: Patient care rounds were performed with nursing    Discussions with Specialists or Other Care Team Provider:  Case Management, Nursing, PT/OT    Education and Discussions with Family / Patient: Spoke with patient's wife via telephone    Time Spent for Care: 30  More than 50% of total time spent on counseling and coordination of care as described above  Current Length of Stay: 4 day(s)    Current Patient Status: Inpatient     Certification Statement: The patient will continue to require additional inpatient hospital stay due to bone scan and likely TMA with Podiatry    Discharge Plan: Pending PT/OT evaluation and treatment    Code Status: Level 1 - Full Code      Subjective:   Patient seen and examined at bedside  No acute events overnight  Denies chest pain, SOB, diaphoresis, nausea/vomiting/diarrhea, fevers/chills       Objective:     Vitals:   Temp (24hrs), Av 8 °F (37 1 °C), Min:98 °F (36 7 °C), Max:99 8 °F (37 7 °C)    Temp:  [98 °F (36 7 °C)-99 8 °F (37 7 °C)] 98 °F (36 7 °C)  HR:  [20-63] 20  Resp:  [20-24] 20  BP: (117-136)/(56-63) 117/56  SpO2: [92 %-99 %] 93 %  Body mass index is 29 42 kg/m²  Input and Output Summary (last 24 hours): Intake/Output Summary (Last 24 hours) at 7/11/2022 1051  Last data filed at 7/11/2022 0930  Gross per 24 hour   Intake 250 ml   Output 0 ml   Net 250 ml       Physical Exam:     Physical Exam  Vitals and nursing note reviewed  Constitutional:       Appearance: He is well-developed  HENT:      Head: Normocephalic and atraumatic  Eyes:      Conjunctiva/sclera: Conjunctivae normal    Cardiovascular:      Rate and Rhythm: Normal rate and regular rhythm  Heart sounds: No murmur heard  Pulmonary:      Effort: Pulmonary effort is normal  No respiratory distress  Breath sounds: Normal breath sounds  Abdominal:      Palpations: Abdomen is soft  Tenderness: There is no abdominal tenderness  Musculoskeletal:      Cervical back: Neck supple  Comments: Left diabetic toe ulcer   Skin:     General: Skin is warm and dry  Neurological:      Mental Status: He is alert  Additional Data:     Labs: I have reviewed pertinent results     Results from last 7 days   Lab Units 07/11/22  0549 07/09/22 0542 07/08/22  0447   WBC Thousand/uL 12 75*   < > 16 91*   HEMOGLOBIN g/dL 8 4*   < > 8 2*   HEMATOCRIT % 25 7*   < > 25 4*   PLATELETS Thousands/uL 200   < > 172   BANDS PCT %  --   --  3   NEUTROS PCT % 76*   < >  --    LYMPHS PCT % 8*   < >  --    LYMPHO PCT %  --   --  2*   MONOS PCT % 9   < >  --    MONO PCT %  --   --  5   EOS PCT % 5   < > 0    < > = values in this interval not displayed       Results from last 7 days   Lab Units 07/11/22  0549 07/09/22  0542 07/08/22  0447 07/07/22  1300   SODIUM mmol/L 131*   < > 129* 130*   POTASSIUM mmol/L 3 7   < > 4 3 4 8   CHLORIDE mmol/L 95*   < > 95* 90*   CO2 mmol/L 26   < > 25 28   BUN mg/dL 62*   < > 45* 37*   CREATININE mg/dL 3 85*   < > 4 17* 3 53*   ANION GAP mmol/L 10   < > 9 12   CALCIUM mg/dL 8 3*   < > 8 4 8 9   ALBUMIN g/dL  --   --  3 0* 3 5 TOTAL BILIRUBIN mg/dL  --   --   --  1 10*   ALK PHOS U/L  --   --   --  93   ALT U/L  --   --   --  10   AST U/L  --   --   --  9*   GLUCOSE RANDOM mg/dL 138   < > 127 182*    < > = values in this interval not displayed  Results from last 7 days   Lab Units 07/07/22  1300   INR  2 15*     Results from last 7 days   Lab Units 07/11/22  0802 07/10/22  2132 07/10/22  1539 07/10/22  1148 07/10/22  0720 07/09/22  2107 07/09/22  1609 07/09/22  1123 07/09/22  0706 07/08/22  2208 07/08/22  1808 07/08/22  1648   POC GLUCOSE mg/dl 157* 212* 268* 303* 139 217* 238* 185* 136 206* 130 161*         Results from last 7 days   Lab Units 07/08/22  0447 07/07/22  1646 07/07/22  1300   LACTIC ACID mmol/L  --  1 9 3 2*   PROCALCITONIN ng/ml 1 72*  --  0 92*         Imaging: I have reviewed pertinent imaging       Recent Cultures (last 7 days):     Results from last 7 days   Lab Units 07/09/22  0929 07/09/22  0542 07/07/22  1311 07/07/22  1300   BLOOD CULTURE  No Growth at 24 hrs  No Growth at 24 hrs   Staphylococcus aureus* Staphylococcus aureus*   GRAM STAIN RESULT   --   --  Gram positive cocci in pairs, chains and clusters* Gram positive cocci in pairs, chains and clusters*       Last 24 Hours Medication List:   Current Facility-Administered Medications   Medication Dose Route Frequency Provider Last Rate    acetaminophen  650 mg Oral Q6H PRN Britney Sneed PA-C      amLODIPine  10 mg Oral Daily Preet Caruso MD      apixaban  5 mg Oral BID Preet Caruso MD      insulin glargine  10 Units Subcutaneous HS Preet Caruso MD      insulin lispro  1-6 Units Subcutaneous TID [facility] Mikaela Dunham MD      loperamide  2 mg Oral Q4H PRN Tee Lane DO      midodrine  5 mg Oral Once per day on Mon Wed Fri Preet Caruso MD      sevelamer  800 mg Oral TID With Meals Preet Caruso MD      tamsulosin  0 4 mg Oral Daily With MD Mariia Hassan torsemide  80 mg Oral Once per day on Sun Tue Thu Sat Henri Goldmann, MD      vancomycin  1,500 mg Intravenous Once per day on Mon Wed Fri Joann Pradhan DO          Today, Patient Was Seen By: Joann Pradhan DO    ** Please Note: Dictation voice to text software may have been used in the creation of this document   **

## 2022-07-11 NOTE — PROGRESS NOTES
Vancomycin Assessment    Manuelito Handley is a 74 y o  male who is currently receiving vancomycin 1000mg IV After dialysis on MWF for skin-soft tissue infection     Relevant clinical data and objective history reviewed:  Creatinine   Date Value Ref Range Status   07/11/2022 3 85 (H) 0 60 - 1 30 mg/dL Final     Comment:     Standardized to IDMS reference method   07/10/2022 4 06 (H) 0 60 - 1 30 mg/dL Final     Comment:     Standardized to IDMS reference method   07/09/2022 3 46 (H) 0 60 - 1 30 mg/dL Final     Comment:     Standardized to IDMS reference method   04/10/2017 1 02 0 70 - 1 25 mg/dL Final     Comment:     Result Comment: For patients >52years of age, the reference limit  for Creatinine is approximately 13% higher for people  identified as -American  Vancomycin Rm   Date Value Ref Range Status   07/11/2022 11 5 10 0 - 20 0 ug/mL Final     /56   Pulse 60   Temp 99 8 °F (37 7 °C) (Tympanic)   Resp (!) 27   Ht 5' 10" (1 778 m)   Wt 93 kg (205 lb 0 4 oz)   SpO2 99%   BMI 29 42 kg/m²   I/O last 3 completed shifts: In: 80 [P O :380; IV Piggyback:200]  Out: 750 [Urine:750]  Lab Results   Component Value Date/Time    BUN 62 (H) 07/11/2022 05:49 AM    BUN 19 04/10/2017 12:00 AM    WBC 12 75 (H) 07/11/2022 05:49 AM    WBC 14 4 (H) 04/10/2017 12:00 AM    WBC NONE SEEN 04/10/2017 12:00 AM    HGB 8 4 (L) 07/11/2022 05:49 AM    HGB 13 2 04/10/2017 12:00 AM    HCT 25 7 (L) 07/11/2022 05:49 AM    HCT 39 7 04/10/2017 12:00 AM     (H) 07/11/2022 05:49 AM    MCV 91 1 04/10/2017 12:00 AM     07/11/2022 05:49 AM     (H) 04/10/2017 12:00 AM     Temp Readings from Last 3 Encounters:   07/10/22 99 8 °F (37 7 °C) (Tympanic)   07/07/22 (!) 102 3 °F (39 1 °C) (Oral)   07/05/22 97 8 °F (36 6 °C) (Tympanic)     Vancomycin Days of Therapy: 5    Assessment/Plan  The patient is currently on vancomycin utilizing scheduled dosing    Baseline risks associated with therapy include: pre-existing renal impairment, concomitant nephrotoxic medications, and advanced age  The patient is receiving 1000mg IV After dialysis on MWF with the most recent random vancomycin level being at steady-state and sub-therapeutic based on a goal of 15-20 (appropriate for most indications) ; therefore, after clinical evaluation will be changed to 1500mg IV After dialysis on MWF   Pharmacy will continue to follow closely for s/sx of nephrotoxicity, infusion reactions, and appropriateness of therapy  BMP and CBC will be ordered per protocol  Plan for trough as patient approaches steady state, prior to the 3rd  dose at approximately 0600 on 7/15/22  Pharmacy will continue to follow the patients culture results and clinical progress daily      Annabella Napoles, Pharmacist

## 2022-07-11 NOTE — TELEMEDICINE
e-Consult (IPC)  - Interventional Radiology  Bill Schuler 74 y o  male MRN: [id number]  Unit/Bed#: ICU 11-01 Encounter: [id number]          Interventional Radiology has been consulted to evaluate Bill Schuler      IP Consult to IR  Consult performed by: Breonna Alford MD  Consult ordered by: Sergio Salcido DO        07/11/22    Assessment/Recommendation:     74year old male with a right chest port, currently used for chemotherapy for colon cancer  Right TDC in place  Gram + bacteremia from 7/7, repeat cultures from 7/9 are negative  Infection presumably from foot wound  WBC trending down, currently 12, on abx  INR slightly elevated at 2 15  Patient on Eliquis for Afib - this does not need to be held  Plan for port removal    Plan for tunneled HD cath removal  This should be replaced with a temp line  Total time spent in review of data, discussion with requesting provider and rendering advice was 15 min     Thank you for allowing Interventional Radiology to participate in the care of Bill Schuler  Please don't hesitate to call or TigerText us with any questions       Breonna Alford MD 145

## 2022-07-11 NOTE — DISCHARGE INSTR - OTHER ORDERS
Skin and Wound Care Plan:   1  Lower extremity wound care per podiatry  2  Apply skin nourishing cream to the skin daily  3  Elevate heels off of bed with pillows to offload  4  Apply hydraguard to buttocks and sacrum BID and PRN  5  Turn and reposition patient Q2 hours  6  Allevyn life silicone bordered foam dressing to the right heel, won with P, date, peel back daily for skin assessment, reapply dressing, change every 3 days or PRN  7   Apply 3M No Sting the intact scabs on the left lower arm daily

## 2022-07-11 NOTE — ASSESSMENT & PLAN NOTE
· Source likely left diabetic toe ulcer  · Likely MSSA  · Repeat blood cultures pending  · Continue vancomycin ; will likely deescalate to cefazolin however will await Infectious Disease recommendations  · Follow-up repeat blood cultures ; no growth at 24 hours

## 2022-07-11 NOTE — QUICK NOTE
ID Plan of Care Note:    Patient now afebrile, leukocytosis improving  Blood cultures from admission growing MSSA in 2/2 sets  Source is likely left foot infection  Podiatry is following, patient will likely need TMA  Unfortunately, patient has PPM, PortACath, and right chest dialysis catheter, along with right shoulder hardware  TTE poor quality study but no vegetations  In the setting of staph bacteremia, patient will need ELBERT for further evaluation and removal of intravascular lines  -follow up repeat blood cultures  -recommend cardiology consult and ELBERT  -recommend removal of HD catheter and PortAcath  -stop IV vancomycin and start IV Cefazolin 1g q24hr (dosed after HD on HD days)    ID will see again 7/12  Please call with questions

## 2022-07-11 NOTE — ASSESSMENT & PLAN NOTE
· Requiring 2 L of nasal cannula supplemental O2 this morning  · Possibly secondary to volume overload  · Wean O2 as tolerated  · Nephrology following ; appreciate recommendations regarding possible diuresis  · Will remove volume during HD treatments  · Goal SpO2 > 90%

## 2022-07-11 NOTE — ASSESSMENT & PLAN NOTE
· Resolved with IV fluid resuscitation and broad-spectrum IV antibiotics  · Secondary to diabetic foot infection present on admission with fever and leukocytosis  · Lactic acidosis resolved  · Will follow-up bone scan of left foot  · Continue IV antibiotics with vancomycin  · Infectious Diseases following  · Trend procalcitonin  · Blood cultures positive for gram-positive cocci ; repeat blood cultures pending

## 2022-07-11 NOTE — PLAN OF CARE
Problem: Potential for Falls  Goal: Patient will remain free of falls  Description: INTERVENTIONS:  - Educate patient/family on patient safety including physical limitations  - Instruct patient to call for assistance with activity   - Consult OT/PT to assist with strengthening/mobility   - Keep Call bell within reach  - Keep bed low and locked with side rails adjusted as appropriate  - Keep care items and personal belongings within reach  - Initiate and maintain comfort rounds  - Make Fall Risk Sign visible to staff  - Apply yellow socks and bracelet for high fall risk patients  - Consider moving patient to room near nurses station  Outcome: Progressing     Problem: MOBILITY - ADULT  Goal: Maintain or return to baseline ADL function  Description: INTERVENTIONS:  -  Assess patient's ability to carry out ADLs; assess patient's baseline for ADL function and identify physical deficits which impact ability to perform ADLs (bathing, care of mouth/teeth, toileting, grooming, dressing, etc )  - Assess/evaluate cause of self-care deficits   - Assess range of motion  - Assess patient's mobility; develop plan if impaired  - Assess patient's need for assistive devices and provide as appropriate  - Encourage maximum independence but intervene and supervise when necessary  - Involve family in performance of ADLs  - Assess for home care needs following discharge   - Consider OT consult to assist with ADL evaluation and planning for discharge  - Provide patient education as appropriate  Outcome: Progressing  Goal: Maintains/Returns to pre admission functional level  Description: INTERVENTIONS:  - Perform BMAT or MOVE assessment daily    - Set and communicate daily mobility goal to care team and patient/family/caregiver     - Collaborate with rehabilitation services on mobility goals if consulted  - Out of bed for toileting  - Record patient progress and toleration of activity level   Outcome: Progressing     Problem: PAIN - ADULT  Goal: Verbalizes/displays adequate comfort level or baseline comfort level  Description: Interventions:  - Encourage patient to monitor pain and request assistance  - Assess pain using appropriate pain scale  - Administer analgesics based on type and severity of pain and evaluate response  - Implement non-pharmacological measures as appropriate and evaluate response  - Consider cultural and social influences on pain and pain management  - Notify physician/advanced practitioner if interventions unsuccessful or patient reports new pain  Outcome: Progressing     Problem: INFECTION - ADULT  Goal: Absence or prevention of progression during hospitalization  Description: INTERVENTIONS:  - Assess and monitor for signs and symptoms of infection  - Monitor lab/diagnostic results  - Monitor all insertion sites, i e  indwelling lines, tubes, and drains  - Monitor endotracheal if appropriate and nasal secretions for changes in amount and color  - [place] appropriate cooling/warming therapies per order  - Administer medications as ordered  - Instruct and encourage patient and family to use good hand hygiene technique  - Identify and instruct in appropriate isolation precautions for identified infection/condition  Outcome: Progressing  Goal: Absence of fever/infection during neutropenic period  Description: INTERVENTIONS:  - Monitor WBC    Outcome: Progressing     Problem: SAFETY ADULT  Goal: Patient will remain free of falls  Description: INTERVENTIONS:  - Educate patient/family on patient safety including physical limitations  - Instruct patient to call for assistance with activity   - Consult OT/PT to assist with strengthening/mobility   - Keep Call bell within reach  - Keep bed low and locked with side rails adjusted as appropriate  - Keep care items and personal belongings within reach  - Initiate and maintain comfort rounds  - Make Fall Risk Sign visible to staff  - Apply yellow socks and bracelet for high fall risk patients  - Consider moving patient to room near nurses station  Outcome: Progressing  Goal: Maintain or return to baseline ADL function  Description: INTERVENTIONS:  -  Assess patient's ability to carry out ADLs; assess patient's baseline for ADL function and identify physical deficits which impact ability to perform ADLs (bathing, care of mouth/teeth, toileting, grooming, dressing, etc )  - Assess/evaluate cause of self-care deficits   - Assess range of motion  - Assess patient's mobility; develop plan if impaired  - Assess patient's need for assistive devices and provide as appropriate  - Encourage maximum independence but intervene and supervise when necessary  - Involve family in performance of ADLs  - Assess for home care needs following discharge   - Consider OT consult to assist with ADL evaluation and planning for discharge  - Provide patient education as appropriate  Outcome: Progressing  Goal: Maintains/Returns to pre admission functional level  Description: INTERVENTIONS:  - Perform BMAT or MOVE assessment daily    - Set and communicate daily mobility goal to care team and patient/family/caregiver     - Collaborate with rehabilitation services on mobility goals if consulted  - Out of bed for toileting  - Record patient progress and toleration of activity level   Outcome: Progressing     Problem: DISCHARGE PLANNING  Goal: Discharge to home or other facility with appropriate resources  Description: INTERVENTIONS:  - Identify barriers to discharge w/patient and caregiver  - Arrange for needed discharge resources and transportation as appropriate  - Identify discharge learning needs (meds, wound care, etc )  - Arrange for interpretive services to assist at discharge as needed  - Refer to Case Management Department for coordinating discharge planning if the patient needs post-hospital services based on physician/advanced practitioner order or complex needs related to functional status, cognitive ability, or social support system  Outcome: Progressing     Problem: Knowledge Deficit  Goal: Patient/family/caregiver demonstrates understanding of disease process, treatment plan, medications, and discharge instructions  Description: Complete learning assessment and assess knowledge base  Interventions:  - Provide teaching at level of understanding  - Provide teaching via preferred learning methods  Outcome: Progressing     Problem: Nutrition/Hydration-ADULT  Goal: Nutrient/Hydration intake appropriate for improving, restoring or maintaining nutritional needs  Description: Monitor and assess patient's nutrition/hydration status for malnutrition  Collaborate with interdisciplinary team and initiate plan and interventions as ordered  Monitor patient's weight and dietary intake as ordered or per policy  Utilize nutrition screening tool and intervene as necessary  Determine patient's food preferences and provide high-protein, high-caloric foods as appropriate       INTERVENTIONS:  - Monitor oral intake, urinary output, labs, and treatment plans  - Assess nutrition and hydration status and recommend course of action  - Evaluate amount of meals eaten  - Assist patient with eating if necessary   - Allow adequate time for meals  - Recommend/ encourage appropriate diets, oral nutritional supplements, and vitamin/mineral supplements  - Order, calculate, and assess calorie counts as needed  - Recommend, monitor, and adjust tube feedings and TPN/PPN based on assessed needs  - Assess need for intravenous fluids  - Provide specific nutrition/hydration education as appropriate  - Include patient/family/caregiver in decisions related to nutrition  Outcome: Progressing     Problem: Prexisting or High Potential for Compromised Skin Integrity  Goal: Skin integrity is maintained or improved  Description: INTERVENTIONS:  - Identify patients at risk for skin breakdown  - Assess and monitor skin integrity  - Assess and monitor nutrition and hydration status  - Monitor labs   - Assess for incontinence   - Turn and reposition patient  - Assist with mobility/ambulation  - Relieve pressure over bony prominences  - Avoid friction and shearing  - Provide appropriate hygiene as needed including keeping skin clean and dry  - Evaluate need for skin moisturizer/barrier cream  - Collaborate with interdisciplinary team   - Patient/family teaching  - Consider wound care consult   Outcome: Progressing

## 2022-07-11 NOTE — PLAN OF CARE
Post-Dialysis RN Treatment Note    Blood Pressure:  Pre 127/59 mm/Hg  Post 144/56  mmHg   EDW   kg    Weight:  Pre 93 kg   Post 93  kg   Mode of weight measurement:    Volume Removed 1000  ml    Treatment duration 210 minutes    NS given     Treatment shortened?     Medications given during Rx NA   Estimated Kt/V  1 23   Access type: CVC   Access Issues:  NA   Report called to primary nurse   YES Brett Silva RN          Goal to remove 1 0kg with today's treatment  Problem: METABOLIC, FLUID AND ELECTROLYTES - ADULT  Goal: Electrolytes maintained within normal limits  Description: INTERVENTIONS:  - Monitor labs and assess patient for signs and symptoms of electrolyte imbalances  - Administer electrolyte replacement as ordered  - Monitor response to electrolyte replacements, including repeat lab results as appropriate  - Instruct patient on fluid and nutrition as appropriate  Outcome: Progressing  Goal: Fluid balance maintained  Description: INTERVENTIONS:  - Monitor labs   - Monitor I/O and WT  - Instruct patient on fluid and nutrition as appropriate  - Assess for signs & symptoms of volume excess or deficit  Outcome: Progressing

## 2022-07-11 NOTE — ASSESSMENT & PLAN NOTE
· Status post incision and drainage at the bedside with Podiatry  · Continue antibiotics with IV vancomycin  · Pharmacy consult for vancomycin trough management  · Podiatry consulted ; appreciate recommendations  · Follow-up bone scan  · Will likely need TMA as wound is probed to bone with likely underlying osteomyelitis  · Infectious Diseases following

## 2022-07-12 ENCOUNTER — APPOINTMENT (INPATIENT)
Dept: NON INVASIVE DIAGNOSTICS | Facility: HOSPITAL | Age: 67
DRG: 853 | End: 2022-07-12
Payer: MEDICARE

## 2022-07-12 ENCOUNTER — APPOINTMENT (INPATIENT)
Dept: INTERVENTIONAL RADIOLOGY/VASCULAR | Facility: HOSPITAL | Age: 67
DRG: 853 | End: 2022-07-12
Attending: INTERNAL MEDICINE
Payer: MEDICARE

## 2022-07-12 ENCOUNTER — HOSPITAL ENCOUNTER (OUTPATIENT)
Dept: INFUSION CENTER | Facility: HOSPITAL | Age: 67
Discharge: HOME | End: 2022-07-12
Attending: INTERNAL MEDICINE

## 2022-07-12 ENCOUNTER — APPOINTMENT (INPATIENT)
Dept: NUCLEAR MEDICINE | Facility: HOSPITAL | Age: 67
DRG: 853 | End: 2022-07-12
Payer: MEDICARE

## 2022-07-12 LAB
ANION GAP SERPL CALCULATED.3IONS-SCNC: 8 MMOL/L (ref 4–13)
BASOPHILS # BLD AUTO: 0.03 THOUSANDS/ΜL (ref 0–0.1)
BASOPHILS NFR BLD AUTO: 0 % (ref 0–1)
BUN SERPL-MCNC: 34 MG/DL (ref 5–25)
CALCIUM SERPL-MCNC: 8.1 MG/DL (ref 8.4–10.2)
CHLORIDE SERPL-SCNC: 95 MMOL/L (ref 96–108)
CO2 SERPL-SCNC: 28 MMOL/L (ref 21–32)
CREAT SERPL-MCNC: 2.77 MG/DL (ref 0.6–1.3)
EOSINOPHIL # BLD AUTO: 0.24 THOUSAND/ΜL (ref 0–0.61)
EOSINOPHIL NFR BLD AUTO: 2 % (ref 0–6)
ERYTHROCYTE [DISTWIDTH] IN BLOOD BY AUTOMATED COUNT: 17.2 % (ref 11.6–15.1)
GFR SERPL CREATININE-BSD FRML MDRD: 22 ML/MIN/1.73SQ M
GLUCOSE SERPL-MCNC: 140 MG/DL (ref 65–140)
GLUCOSE SERPL-MCNC: 143 MG/DL (ref 65–140)
GLUCOSE SERPL-MCNC: 166 MG/DL (ref 65–140)
GLUCOSE SERPL-MCNC: 166 MG/DL (ref 65–140)
GLUCOSE SERPL-MCNC: 237 MG/DL (ref 65–140)
HCT VFR BLD AUTO: 26.2 % (ref 36.5–49.3)
HGB BLD-MCNC: 8.3 G/DL (ref 12–17)
IMM GRANULOCYTES # BLD AUTO: 0.28 THOUSAND/UL (ref 0–0.2)
IMM GRANULOCYTES NFR BLD AUTO: 2 % (ref 0–2)
INR PPP: 2.13 (ref 0.84–1.19)
LYMPHOCYTES # BLD AUTO: 1.06 THOUSANDS/ΜL (ref 0.6–4.47)
LYMPHOCYTES NFR BLD AUTO: 8 % (ref 14–44)
MAGNESIUM SERPL-MCNC: 1.9 MG/DL (ref 1.9–2.7)
MCH RBC QN AUTO: 32.5 PG (ref 26.8–34.3)
MCHC RBC AUTO-ENTMCNC: 31.7 G/DL (ref 31.4–37.4)
MCV RBC AUTO: 103 FL (ref 82–98)
MONOCYTES # BLD AUTO: 1.15 THOUSAND/ΜL (ref 0.17–1.22)
MONOCYTES NFR BLD AUTO: 9 % (ref 4–12)
MRSA NOSE QL CULT: NORMAL
NEUTROPHILS # BLD AUTO: 10.24 THOUSANDS/ΜL (ref 1.85–7.62)
NEUTS SEG NFR BLD AUTO: 79 % (ref 43–75)
NRBC BLD AUTO-RTO: 0 /100 WBCS
PHOSPHATE SERPL-MCNC: 1.8 MG/DL (ref 2.3–4.1)
PLATELET # BLD AUTO: 211 THOUSANDS/UL (ref 149–390)
PMV BLD AUTO: 10.5 FL (ref 8.9–12.7)
POTASSIUM SERPL-SCNC: 3.9 MMOL/L (ref 3.5–5.3)
PROTHROMBIN TIME: 23.8 SECONDS (ref 11.6–14.5)
RBC # BLD AUTO: 2.55 MILLION/UL (ref 3.88–5.62)
SODIUM SERPL-SCNC: 131 MMOL/L (ref 135–147)
WBC # BLD AUTO: 13 THOUSAND/UL (ref 4.31–10.16)

## 2022-07-12 PROCEDURE — 99232 SBSQ HOSP IP/OBS MODERATE 35: CPT | Performed by: NURSE PRACTITIONER

## 2022-07-12 PROCEDURE — 85025 COMPLETE CBC W/AUTO DIFF WBC: CPT | Performed by: INTERNAL MEDICINE

## 2022-07-12 PROCEDURE — 99152 MOD SED SAME PHYS/QHP 5/>YRS: CPT

## 2022-07-12 PROCEDURE — 93922 UPR/L XTREMITY ART 2 LEVELS: CPT | Performed by: SURGERY

## 2022-07-12 PROCEDURE — 78800 RP LOCLZJ TUM 1 AREA 1 D IMG: CPT

## 2022-07-12 PROCEDURE — 99223 1ST HOSP IP/OBS HIGH 75: CPT | Performed by: INTERNAL MEDICINE

## 2022-07-12 PROCEDURE — 80048 BASIC METABOLIC PNL TOTAL CA: CPT | Performed by: INTERNAL MEDICINE

## 2022-07-12 PROCEDURE — 36590 REMOVAL TUNNELED CV CATH: CPT | Performed by: RADIOLOGY

## 2022-07-12 PROCEDURE — 93923 UPR/LXTR ART STDY 3+ LVLS: CPT

## 2022-07-12 PROCEDURE — 36590 REMOVAL TUNNELED CV CATH: CPT

## 2022-07-12 PROCEDURE — A9569 TECHNETIUM TC-99M AUTO WBC: HCPCS

## 2022-07-12 PROCEDURE — G1004 CDSM NDSC: HCPCS

## 2022-07-12 PROCEDURE — 83735 ASSAY OF MAGNESIUM: CPT | Performed by: INTERNAL MEDICINE

## 2022-07-12 PROCEDURE — 99232 SBSQ HOSP IP/OBS MODERATE 35: CPT | Performed by: INTERNAL MEDICINE

## 2022-07-12 PROCEDURE — 99153 MOD SED SAME PHYS/QHP EA: CPT

## 2022-07-12 PROCEDURE — 77001 FLUOROGUIDE FOR VEIN DEVICE: CPT | Performed by: RADIOLOGY

## 2022-07-12 PROCEDURE — 87070 CULTURE OTHR SPECIMN AEROBIC: CPT | Performed by: RADIOLOGY

## 2022-07-12 PROCEDURE — 84100 ASSAY OF PHOSPHORUS: CPT | Performed by: INTERNAL MEDICINE

## 2022-07-12 PROCEDURE — 85610 PROTHROMBIN TIME: CPT | Performed by: INTERNAL MEDICINE

## 2022-07-12 PROCEDURE — 05PY33Z REMOVAL OF INFUSION DEVICE FROM UPPER VEIN, PERCUTANEOUS APPROACH: ICD-10-PCS | Performed by: RADIOLOGY

## 2022-07-12 PROCEDURE — 99233 SBSQ HOSP IP/OBS HIGH 50: CPT | Performed by: STUDENT IN AN ORGANIZED HEALTH CARE EDUCATION/TRAINING PROGRAM

## 2022-07-12 PROCEDURE — 93925 LOWER EXTREMITY STUDY: CPT

## 2022-07-12 PROCEDURE — 82948 REAGENT STRIP/BLOOD GLUCOSE: CPT

## 2022-07-12 PROCEDURE — 0JPT3WZ REMOVAL OF TOTALLY IMPLANTABLE VASCULAR ACCESS DEVICE FROM TRUNK SUBCUTANEOUS TISSUE AND FASCIA, PERCUTANEOUS APPROACH: ICD-10-PCS | Performed by: RADIOLOGY

## 2022-07-12 RX ORDER — MIDAZOLAM HYDROCHLORIDE 2 MG/2ML
INJECTION, SOLUTION INTRAMUSCULAR; INTRAVENOUS CODE/TRAUMA/SEDATION MEDICATION
Status: COMPLETED | OUTPATIENT
Start: 2022-07-12 | End: 2022-07-12

## 2022-07-12 RX ORDER — FENTANYL CITRATE 50 UG/ML
INJECTION, SOLUTION INTRAMUSCULAR; INTRAVENOUS CODE/TRAUMA/SEDATION MEDICATION
Status: COMPLETED | OUTPATIENT
Start: 2022-07-12 | End: 2022-07-12

## 2022-07-12 RX ADMIN — MIDAZOLAM HYDROCHLORIDE 0.5 MG: 1 INJECTION, SOLUTION INTRAMUSCULAR; INTRAVENOUS at 13:14

## 2022-07-12 RX ADMIN — MIDAZOLAM HYDROCHLORIDE 0.5 MG: 1 INJECTION, SOLUTION INTRAMUSCULAR; INTRAVENOUS at 13:20

## 2022-07-12 RX ADMIN — CEFAZOLIN SODIUM 1000 MG: 1 SOLUTION INTRAVENOUS at 17:12

## 2022-07-12 RX ADMIN — APIXABAN 5 MG: 5 TABLET, FILM COATED ORAL at 17:12

## 2022-07-12 RX ADMIN — SEVELAMER HYDROCHLORIDE 800 MG: 800 TABLET, FILM COATED PARENTERAL at 17:12

## 2022-07-12 RX ADMIN — TAMSULOSIN HYDROCHLORIDE 0.4 MG: 0.4 CAPSULE ORAL at 17:12

## 2022-07-12 RX ADMIN — TORSEMIDE 80 MG: 20 TABLET ORAL at 08:26

## 2022-07-12 RX ADMIN — INSULIN LISPRO 1 UNITS: 100 INJECTION, SOLUTION INTRAVENOUS; SUBCUTANEOUS at 11:24

## 2022-07-12 RX ADMIN — FENTANYL CITRATE 25 MCG: 50 INJECTION, SOLUTION INTRAMUSCULAR; INTRAVENOUS at 13:14

## 2022-07-12 RX ADMIN — INSULIN LISPRO 1 UNITS: 100 INJECTION, SOLUTION INTRAVENOUS; SUBCUTANEOUS at 17:13

## 2022-07-12 RX ADMIN — FENTANYL CITRATE 25 MCG: 50 INJECTION, SOLUTION INTRAMUSCULAR; INTRAVENOUS at 13:20

## 2022-07-12 RX ADMIN — AMLODIPINE BESYLATE 10 MG: 10 TABLET ORAL at 21:13

## 2022-07-12 RX ADMIN — INSULIN GLARGINE 10 UNITS: 100 INJECTION, SOLUTION SUBCUTANEOUS at 21:16

## 2022-07-12 NOTE — INTERVAL H&P NOTE
Update: (This section must be completed if the H&P was completed greater than 24 hrs to procedure or admission)    H&P reviewed  After examining the patient, I find no changed to the H&P since it had been written  99S with multiple active medical issues, please see prior notes, prior interdisciplinary discussion today    Today we will  - remove the port    We await further cardiology evaluation for ELBERT  Patient also requires evaluation for source control of his foot    - his tunneled dialysis catheter will need management pending above management    He has peripheral IV access  We will sedate    Risks benefits alternatives discussed    MP2 ASA3    Patient re-evaluated   Accept as history and physical     Shital Pérez MD/July 12, 2022/1:00 PM

## 2022-07-12 NOTE — BRIEF OP NOTE (RAD/CATH)
INTERVENTIONAL RADIOLOGY PROCEDURE NOTE    Date: 7/12/2022    Procedure: PORT REMOVAL    Preoperative diagnosis:   1  Severe sepsis ([facility] [address] )    2  Cellulitis of left foot    3  Stage 4 chronic kidney disease ([facility] [address] )    4  Gram-positive bacteremia    5  Pacemaker         Postoperative diagnosis: Same  Surgeon: Janie Vaughan MD     Assistant: None  No qualified resident was available  Blood loss: 0    Specimens: tip for culture     Findings: R chest port removal    Remaining devices without disruption    Complications: None immediate      Anesthesia: conscious sedation

## 2022-07-12 NOTE — CONSULTS
[address] [facility] 1955, 74 y o  male MRN: [id number]  Unit/Bed#: ICU 11-01 Encounter: [id number]  Primary Care Provider: Stanley Rodriguez DO   Date and time admitted to hospital: 7/7/2022 12:44 PM    Inpatient consult to Cardiology  Consult performed by: Rashida Vang MD  Consult ordered by: Mauri Anna DO          Mitral stenosis  Assessment & Plan  Mild by my echo review  Aortic valve stenosis, nonrheumatic  Assessment & Plan  Mild to moderate by echo and exam     Complete heart block [facility])  Assessment & Plan  Pacemaker in place  Gram-positive bacteremia  Assessment & Plan  Asks to assess whether there is any endocarditis or pacemaker wire involvement  I reviewed the echo and that is certainly not diagnostic in either direction  I understand that a transesophageal echo is desired and I will make arrangements for this  Likely this will be performed later this week but I will be speaking to my partner who performs them regarding timing  This point the more urgent issue is wound care and possible transmetatarsal amputation well antibiotics continue  Persistent atrial fibrillation ([address] )  Assessment & Plan  Rate controlled and patient is on anticoagulation  Other summary comments: Will follow with primary service intermittently and in particular regarding timing of transesophageal echo  Outpatient Cardiologist: Has been Soledad Juarez ( He is retiring) but will be Ana María    HPI: Edgarteddy Both is a 74y o  year old male who presented with fever  He has a left foot abscess  There is staph aureus bacteremia  Patient has a chemo port for colon cancer as well as a dialysis port  There is concern regarding seating of this as well as pacemaker leads an heart valves  We were asked to comment regarding transesophageal echo particularly as his trans thoracic echo was limited in visualizing valve definition      Patient has been following with Dr Marycruz Joe Mariposa for a number of years in our [facility] office  A dual-chamber Medtronic pacemaker was placed on 11/06/2018  There has been most recently by echo mild-to-moderate aortic stenosis and mild mitral stenosis  No recent chest pain or chest pressure  No syncope or near syncope  EKG:   Atrial flutter underlying  Ventricular pacing with 100% capture  MOST  RECENT CARDIAC IMAGING:   TTE 07/08/2022:  Normal LV systolic function  Mild to moderate aortic stenosis  Mild LVH  Mild mitral stenosis  Review of Systems: a 10 point review of systems was conducted and is negative except for as mentioned in the HPI or as below          Historical Information   Past Medical History:   Diagnosis Date    Anemia     iron def    Arthritis     OA    Bruise of both arms     forearms and both hands    Bruises easily     Cancer ([facility] [address] ) 09/01/2021    colon    CH[facility]congestive [facility] failure) [facility]     Chronic kidney disease     CPAP (continuous positive airway pressure) dependence     Diabetes mellitus ([facility] [address] )     Diabetic foot ulcer ([facility] [address] )     Eczema     Erectile dysfunction     Gout     Heart murmur     History of permanent cardiac pacemaker placement 11/2018    History of transfusion     Hyperlipidemia     Hypertension     Hypoglycemia 03/08/2019    Murmur     Nephropathy     Osteoarthritis     Pacemaker 11/06/2018    PAD (peripheral artery disease) ([facility])     Seasonal allergies     Sleep apnea     Toe infection     bilat great toes    Vertigo     Walks frequently     Wears dentures     upper    Wears glasses      Past Surgical History:   Procedure Laterality Date    CARDIAC PACEMAKER PLACEMENT      CARPAL TUNNEL RELEASE Left     CARPAL TUNNEL RELEASE Right     CARPAL TUNNEL RELEASE      COLONOSCOPY  08/2020    COLONOSCOPY      FL GUIDED CENTRAL VENOUS ACCESS DEVICE INSERTION  01/11/2022    FOOT AMPUTATION Left 02/10/2020    Procedure: PARTIAL 1ST RAY AMPUTATION;  Surgeon: Yadi Lopez LIZBETH Carrillo;  [facility];  Service: Podiatry    INCISION AND DRAINAGE OF WOUND Left 01/12/2020    Procedure: INCISION AND DRAINAGE (I&D) EXTREMITY AND REMOVAL OF SESMOID BONE;  Surgeon: Randall Ryan DPM;  [facility];  Service: Howard Asherff IR OTHER  01/21/2022    IR PICC REPOSITION  01/14/2020    IR TUNNELED DIALYSIS CATHETER PLACEMENT  04/26/2022    KNEE ARTHROSCOPY Left     KNEE ARTHROSCOPY Right     KNEE SURGERY      FL AMPUTATION TOE,I-P JT Bilateral 05/02/2017    Procedure: PARTIAL AMPUTATION RIGHT AND LEFT HALLUX ;  Surgeon: Janeth Ngo DPM;  [facility];  Service: Podiatry    FL AMPUTATION TOE,MT-P JT Left 07/25/2017    Procedure: 2ND TOE AMPUTATION;  Surgeon: Janeth Ngo DPM;  [facility];  Service: Podiatry    FL ANASTOMOSIS,AV,ANY SITE Left 6/1/2022    Procedure: CREATION FISTULA ARTERIOVENOUS (AV) RADIOCEPHALIC;  Surgeon: Kee Caro MD;  [facility];  Service: Vascular    FL INSJ TUNNELED CTR VAD W/SUBQ PORT AGE 5 YR/> N/A 01/11/2022    Procedure: INSERTION VENOUS PORT ( PORT-A-CATH) IR;  Surgeon: Alisha Douglas DO;  [facility];  Service: Interventional Radiology    RESECTION LOW ANTERIOR LAPAROSCOPIC N/A 10/21/2021    Procedure: RESECTION LOW ANTERIOR LAPAROSCOPIC;  Surgeon: Re Rincon MD;  [facility];  Service: Colorectal    SHOULDER ARTHROSCOPY Left     with screws,RTC    SHOULDER SURGERY Left     rotator cuff    TOE AMPUTATION Left 01/08/2020    Procedure: HALLUX AMPUTATION;  Surgeon: Randall Ryan DPM;  [facility];  Service: Podiatry    UPPER GASTROINTESTINAL ENDOSCOPY  03/2020    Intestinal metaplasia prepyloric    VASECTOMY       Social History     Substance and Sexual Activity   Alcohol Use Never     Social History     Substance and Sexual Activity   Drug Use No     Social History     Tobacco Use   Smoking Status Former Smoker    Packs/day: 0 50    Years: 20 00    Pack years: 10 00    Types: Cigarettes    Start date: 1    Quit date:     Years since quittin 5   Smokeless Tobacco Never Used   Tobacco Comment    quit 10 years ago       Family History:   No longer relevant  Meds/Allergies   all current active meds have been reviewed  Medications Prior to Admission   Medication    allopurinol (ZYLOPRIM) 300 mg tablet    amLODIPine (NORVASC) 10 mg tablet    apixaban (ELIQUIS) 5 mg    famotidine (PEPCID) 40 MG tablet    metoprolol tartrate (LOPRESSOR) 50 mg tablet    omega-3-acid ethyl esters (LOVAZA) 1 g capsule    simvastatin (ZOCOR) 20 mg tablet    tamsulosin (FLOMAX) 0 4 mg    b complex-vitamin C-folic acid (NEPHROCAPS) 1 mg capsule    Dupilumab (Dupixent) 300 MG/2ML SOPN    Insulin Degludec-Liraglutide (Xultophy) 100 units-3 6 mg/mL injection pen    Insulin Pen Needle (BD Pen Needle Zenaida U/F) 32G X 4 MM MISC    midodrine (PROAMATINE) 5 mg tablet    ondansetron (ZOFRAN) 8 mg tablet    OneTouch Ultra test strip    sevelamer carbonate (RENVELA) 800 mg tablet    torsemide (DEMADEX) 20 mg tablet       Allergies   Allergen Reactions    Invokana [Canagliflozin] Other (See Comments)     Caused circulation problems    Lamisil [Terbinafine] Blisters     Wife states " His skin peeled from head to toe"    Other Swelling     Pomegranate - facial swelling, no swelling of tongue, esophagus  Adhesive tape   Latex Rash       Objective   Vitals: Blood pressure 126/60, pulse 60, temperature 99 7 °F (37 6 °C), temperature source Tympanic, resp  rate 18, height 5' 10" (1 778 m), weight 93 kg (205 lb 0 4 oz), SpO2 94 %  , Body mass index is 29 42 kg/m² ,   Orthostatic Blood Pressures    Flowsheet Row Most Recent Value   Blood Pressure 126/60 filed at 2022 0700   Patient Position - Orthostatic VS Lying filed at 2022 4056          Systolic (16QOT), WKB:675 , Min:123 , QLA:727     Diastolic (32UHA), ZOL:01, Min:56, Max:68              Physical Exam:    General:  Normal appearance in no distress  Eyes:  Anicteric  Oral mucosa:  Moist   Neck:  No JVD  Carotid upstrokes are brisk without bruits  No masses  Recent fistula left arm not yet being used  Chest:  Clear to auscultation  Cardiac:  No palpable PMI  Normal S1 and S2   Grade 2-3 systolic murmur heard throughout the chest   No diastolic murmur  No  gallop or rub  Abdomen:  Soft and nontender  No palpable organomegaly or aortic enlargement  Extremities:  No peripheral edema  Musculoskeletal:  Symmetric  Vascular:  Femoral pulses are brisk without bruits  Popliteal  pulses are intact bilaterally  Left foot is wrapped  Neuro:  Grossly symmetric  Psych:  Alert and oriented x3  Lab Results:   June  Troponins:      BNP:   Results from last 6 Months   Lab Units 01/21/22  1359   BNP pg/mL 1,067*       CBC :   Results from last 7 days   Lab Units 07/12/22  0459 07/11/22  0549   WBC Thousand/uL 13 00* 12 75*   HEMOGLOBIN g/dL 8 3* 8 4*   HEMATOCRIT % 26 2* 25 7*   MCV fL 103* 101*   PLATELETS Thousands/uL 211 200     TSH:     CMP:   Results from last 7 days   Lab Units 07/12/22  0459 07/11/22  0549 07/08/22  0447 07/07/22  1300   POTASSIUM mmol/L 3 9 3 7   < > 4 8   CHLORIDE mmol/L 95* 95*   < > 90*   CO2 mmol/L 28 26   < > 28   BUN mg/dL 34* 62*   < > 37*   CREATININE mg/dL 2 77* 3 85*   < > 3 53*   AST U/L  --   --   --  9*   ALT U/L  --   --   --  10   EGFR ml/min/1 73sq m 22 15   < > 16    < > = values in this interval not displayed       Lipid Profile:     Coags:   Results from last 7 days   Lab Units 07/12/22  0459 07/07/22  1300   INR  2 13* 2 15*

## 2022-07-12 NOTE — ASSESSMENT & PLAN NOTE
Asks to assess whether there is any endocarditis or pacemaker wire involvement  I reviewed the echo and that is certainly not diagnostic in either direction  I understand that a transesophageal echo is desired and I will make arrangements for this  Likely this will be performed later this week but I will be speaking to my partner who performs them regarding timing  This point the more urgent issue is wound care and possible transmetatarsal amputation well antibiotics continue

## 2022-07-12 NOTE — PROGRESS NOTES
REQUIRED DOCUMENTATION:      1  This service was provided via Telemedicine  2  Provider located at [facility]  3  TeleMed provider: Karlene Nowak MD  4  Identify all parties in room with patient during tele consult:  RN  5  After connecting through [employer], patient was identified by name and date of birth and assistant checked wristband  Patient was then informed that this was a Telemedicine visit and that the exam was being conducted confidentially over secure lines  My office door was closed  No one else was in the room  Patient acknowledged consent and understanding of privacy and security of the Telemedicine visit, and gave us permission to have the assistant stay in the room in order to assist with the history and to conduct the exam   I informed the patient that I have reviewed their record in Epic and presented the opportunity for them to ask any questions regarding the visit today  The patient agreed to participate  Progress Note - Infectious Disease   Tom Vinson 74 y o  male MRN: [id number]  Unit/Bed#: ICU 11-01 Encounter: [id number]      Impression/Plan:    1  Sepsis, present on admission  Fever, leukocytosis  Due to MSSA bacteremia 2/2 left foot cellulitis  Patient clinically improving, afebrile without leukocytosis   -antibiotics as below   -monitor WBC count, fever curve     2  MSSA bacteremia  Due to left foot cellulitis  Repeat blood cultures negative  Challenging situation as patient has underlying PPM, left shoulder hardware, right chest diaylsis catheter and PortACath  Multidisciplinary discussion today regarding best management   TTE no vegetations but not diagnostic     -continue IV Cefazolin 1g q24hr dosed after HD on HD days    -IR will remove PortACath   -recommend ELBERT, PPM removal if possible   -cardiology consulted   -will need source control of left foot infection   -ideally HD catheter would also be removed or exchanged; will await ELBERT and decision about PPM before removing  No urgency since blood cultures cleared on antibiotics and patient is stable      3  Left foot cellulitis and abscess  Status post bedside debridement by Podiatry 7/10    -follow up cerectec scan   -per Podiatry will likely need further intervention, possible TMA     4  Type 2 diabetes  Risk factor for infection, poor wound healing    -Recommend strict glycemic control to aid with wound healing     5  Sigmoid cancer  Last cycle chemotherapy was  via R chest port-a-cath  Risk factor for immune suppression    -Management per Heme-Onc     6  ESRD on dialysis  Via R permacath with no local signs of infection, however in setting of staph aureus bacteremia would consider line contaminated     -Management per nephrology       Above management plan discussed in detail with the primary team   ID will follow  I spent 35 minutes in evaluation of the patient of which 20 minutes was in counseling/coordination of care    Antibiotics:  Abx day 6  Cefazolin day 2    Subjective:  Patient feeling better since admission  No fever, chills, chest pain, diarrhea, pain in his feet  Objective:  Vitals:  Temp:  [97 1 °F (36 2 °C)-99 7 °F (37 6 °C)] 97 3 °F (36 3 °C)  HR:  [60-63] 60  Resp:  [18-20] 20  BP: (126-173)/(60-74) 138/64  SpO2:  [88 %-100 %] 92 %  Temp (24hrs), Av 4 °F (36 9 °C), Min:97 1 °F (36 2 °C), Max:99 7 °F (37 6 °C)  Current: Temperature: (!) 97 3 °F (36 3 °C)    Physical Exam:     Documented physical exam has been primarily done by the patient's nurse and/or the primary service due to limited examination abilities on telemedicine     General Appearance:  Alert, interactive, nontoxic, no acute distress  Throat: Oropharynx moist without lesions  Lungs:   Clear to auscultation bilaterally; no wheezes, rhonchi or rales; respirations unlabored   Heart:  RRR; no murmur, rub or gallop   Abdomen:   Soft, non-tender, non-distended, positive bowel sounds       Extremities: No clubbing, cyanosis or edema Skin: Pacemaker, port and permacath site C/D/I  Left foot diffuse erythema, superficial wounds over L 2nd and 3rd toes, s/p partial 2nd toe amputation and first toe amputation       Labs: All pertinent labs and imaging studies were personally reviewed  Results from last 7 days   Lab Units 07/12/22  0459 07/11/22  0549 07/10/22  0547   WBC Thousand/uL 13 00* 12 75* 14 79*   HEMOGLOBIN g/dL 8 3* 8 4* 8 2*   PLATELETS Thousands/uL 211 200 202     Results from last 7 days   Lab Units 07/12/22  0459 07/11/22  0549 07/10/22  0547 07/08/22  0447 07/07/22  1300   SODIUM mmol/L 131* 131* 128*   < > 130*   POTASSIUM mmol/L 3 9 3 7 3 7   < > 4 8   CHLORIDE mmol/L 95* 95* 93*   < > 90*   CO2 mmol/L 28 26 26   < > 28   BUN mg/dL 34* 62* 55*   < > 37*   CREATININE mg/dL 2 77* 3 85* 4 06*   < > 3 53*   EGFR ml/min/1 73sq m 22 15 14   < > 16   CALCIUM mg/dL 8 1* 8 3* 8 2*   < > 8 9   AST U/L  --   --   --   --  9*   ALT U/L  --   --   --   --  10   ALK PHOS U/L  --   --   --   --  93    < > = values in this interval not displayed  Results from last 7 days   Lab Units 07/08/22  0447 07/07/22  1300   PROCALCITONIN ng/ml 1 72* 0 92*                   Micro:  Results from last 7 days   Lab Units 07/09/22  0929 07/09/22  0542 07/08/22  2009 07/07/22  1311 07/07/22  1300   BLOOD CULTURE  No Growth at 72 hrs   No Growth at 72 hrs   --  Staphylococcus aureus* Staphylococcus aureus*   GRAM STAIN RESULT   --   --   --  Gram positive cocci in pairs, chains and clusters* Gram positive cocci in pairs, chains and clusters*   MRSA CULTURE ONLY   --   --  No Methicillin Resistant Staphlyococcus aureus (MRSA) isolated  --   --        Imaging:  Imaging in PACS personally reviewed

## 2022-07-12 NOTE — PROGRESS NOTES
NEPHROLOGY PROGRESS NOTE   Kimi Santos 74 y o  male MRN: [id number]  Unit/Bed#: ICU 11-01 Encounter: [id number]    Assessment/Plan:    77-year-old man with hemodialysis dependent ELZBIETA transition to ESRD, colon cancer being treated for severe sepsis POA/resolved secondary to MSSA bacteremia in the setting of left foot diabetic ulcer with possible endocarditis  Nephrology following along for dialysis needs  1  Hemodialysis dependent ELZBIETA with probable transition ESRD on hemodialysis Monday, Wednesday, Friday  · At [facility]  Most recent dialysis session was yesterday for 1 L ultrafilter  Next hemodialysis session tomorrow, 7/13  · Target weight 89 5 kg  · Increase time to 4 hours to facilitate ultrafiltration  · Access:  Left AV fistula placed 05/31/2022  · Access #2:  PermCath  2  Severe sepsis POA/resolved secondary to MSSA bacteremia  · From a Nephrology perspective concern is that patient does have tunneled dialysis hemodialysis catheter which is a potential source of bacteremia  Fortunately, repeat blood cultures are negative  Appreciate IR recommendations regarding preservation of dialysis access  Fortunately patient does have newly created fistula follow-up appointment with vascular surgery on 08/02  3  Volume overload  · Target weight 89 5 kg in weight today 93 kg  Will attempt ultrafiltration with hemodialysis tomorrow and continue torsemide on non dialysis days  4  Intra dialytic hypotension  · Midodrine prior to treatment  5  Anemia of chronic kidney disease  · Transfuse for hemoglobin less than 7 0 grams/deciliter  ALFA is contraindicated due to active malignancy  6  Secondary hyperparathyroidism of renal origin  · Not on binders or activated vitamin-D agent      ROS  Seen and examined lying in bed  No physical complaints  States he is urinating having bowel movements well  A complete 10 point review of systems have been performed and are otherwise negative         Historical Information   Past Medical History:   Diagnosis Date    Anemia     iron def    Arthritis     OA    Bruise of both arms     forearms and both hands    Bruises easily     Cancer ([facility] [address] ) 09/01/2021    colon    CHF [facility]congestive heart failure) [facility]     Chronic kidney disease     CPAP (continuous positive airway pressure) dependence     Diabetes mellitus ([facility] [address] )     Diabetic foot ulcer [facility]     Eczema     Erectile dysfunction     Gout     Heart murmur     History of permanent cardiac pacemaker placement 11/2018    History of transfusion     Hyperlipidemia     Hypertension     Hypoglycemia 03/08/2019    Murmur     Nephropathy     Osteoarthritis     Pacemaker 11/06/2018    PAD (peripheral artery disease) ([facility])     Seasonal allergies     Sleep apnea     Toe infection     bilat great toes    Vertigo     Walks frequently     Wears dentures     upper    Wears glasses      Past Surgical History:   Procedure Laterality Date    CARDIAC PACEMAKER PLACEMENT      CARPAL TUNNEL RELEASE Left     CARPAL TUNNEL RELEASE Right     CARPAL TUNNEL RELEASE      COLONOSCOPY  08/2020    COLONOSCOPY      FL GUIDED CENTRAL VENOUS ACCESS DEVICE INSERTION  01/11/2022    FOOT AMPUTATION Left 02/10/2020    Procedure: PARTIAL 1ST RAY AMPUTATION;  Surgeon: Iveth Juarez DPM;  [facility];  Service: Podiatry    INCISION AND DRAINAGE OF WOUND Left 01/12/2020    Procedure: INCISION AND DRAINAGE (I&D) EXTREMITY AND REMOVAL OF SESMOID BONE;  Surgeon: Bhumika Taveras DPM;  [facility];  Service: Podiatry    IR OTHER  01/21/2022    IR PICC REPOSITION  01/14/2020    IR TUNNELED DIALYSIS CATHETER PLACEMENT  04/26/2022    KNEE ARTHROSCOPY Left     KNEE ARTHROSCOPY Right     KNEE SURGERY      NC AMPUTATION TOE,I-P JT Bilateral 05/02/2017    Procedure: PARTIAL AMPUTATION RIGHT AND LEFT HALLUX ;  Surgeon: Iveth Juarez DPM;  [facility];  Service: Podiatry    NC AMPUTATION TOE,MT-P JT Left 07/25/2017 Procedure: 2ND TOE AMPUTATION;  Surgeon: Carlon Dakin, DPM;  [facility];  Service: Podiatry    AK ANASTOMOSIS,AV,ANY SITE Left 2022    Procedure: CREATION FISTULA ARTERIOVENOUS (AV) RADIOCEPHALIC;  Surgeon: Catherine Harris MD;  [facility];  Service: Vascular    AK INSJ TUNNELED CTR VAD W/SUBQ PORT AGE 5 YR/> N/A 2022    Procedure: INSERTION VENOUS PORT ( PORT-A-CATH) IR;  Surgeon: Sanjuanita Lyon DO;  [facility];  Service: Interventional Radiology    RESECTION LOW ANTERIOR LAPAROSCOPIC N/A 10/21/2021    Procedure: RESECTION LOW ANTERIOR LAPAROSCOPIC;  Surgeon: Natan Frye MD;  [facility];  Service: Colorectal    SHOULDER ARTHROSCOPY Left     with screws,RTC    SHOULDER SURGERY Left     rotator cuff    TOE AMPUTATION Left 2020    Procedure: Pietro Washington;  Surgeon: Teri Richard DPM;  [facility];  Service: Podiatry    UPPER GASTROINTESTINAL ENDOSCOPY  2020    Intestinal metaplasia prepyloric    VASECTOMY       Social History   Social History     Substance and Sexual Activity   Alcohol Use Never     Social History     Substance and Sexual Activity   Drug Use No     Social History     Tobacco Use   Smoking Status Former Smoker    Packs/day: 0 50    Years: 20 00    Pack years: 10 00    Types: Cigarettes    Start date: 1    Quit date:     Years since quittin 5   Smokeless Tobacco Never Used   Tobacco Comment    quit 10 years ago       Family History:   Family History   Problem Relation Age of Onset    Heart disease Father         passed away    Hypertension Father     Pulmonary embolism Father     Hypertension Mother         assed away       Medications:  Pertinent medications were reviewed  Current Facility-Administered Medications   Medication Dose Route Frequency Provider Last Rate    acetaminophen  650 mg Oral Q6H PRN Yanira Thomas PA-C      amLODIPine  10 mg Oral Daily [employer] Juliet Allen MD      apixaban  5 mg Oral BID Tobe Kayser, MD      cefazolin  1,000 mg Intravenous Q24H Bhumika Every, DO 1,000 mg (07/11/22 1614)    insulin glargine  10 Units Subcutaneous HS Tobe Kayser, MD      insulin lispro  1-6 Units Subcutaneous TID [facility] Mikaela Gaston MD      loperamide  2 mg Oral Q4H PRN Bhumika Every, DO      midodrine  5 mg Oral Once per day on Mon Wed Fri Tobe Kayser, MD      sevelamer  800 mg Oral TID With Meals Tobe Kayser, MD      tamsulosin  0 4 mg Oral Daily With Rosanne Negron MD      torsemide  80 mg Oral Once per day on Sun Tue Thu Sat Tobe Kayser, MD           Allergies   Allergen Reactions    Invokana [Canagliflozin] Other (See Comments)     Caused circulation problems    Lamisil [Terbinafine] Blisters     Wife states " His skin peeled from head to toe"    Other Swelling     Pomegranate - facial swelling, no swelling of tongue, esophagus  Adhesive tape   Latex Rash         Vitals:   /64   Pulse 60   Temp (!) 97 3 °F (36 3 °C) (Temporal)   Resp 20   Ht 5' 10" (1 778 m)   Wt 93 kg (205 lb 0 4 oz)   SpO2 92%   BMI 29 42 kg/m²   Body mass index is 29 42 kg/m²    SpO2: 92 %,   SpO2 Activity: At Rest,   O2 Device: None (Room air)      Intake/Output Summary (Last 24 hours) at 7/12/2022 1600  Last data filed at 7/12/2022 1200  Gross per 24 hour   Intake 640 ml   Output 300 ml   Net 340 ml     Invasive Devices  Report    Central Venous Catheter Line  Duration           Port A Cath Right Subclavian -- days          Peripheral Intravenous Line  Duration           Peripheral IV 07/07/22 Distal;Right;Ventral (anterior) Forearm 5 days          Line  Duration           Hemodialysis AV Fistula 06/01/22 Left Forearm 41 days          Hemodialysis Catheter  Duration           HD Permanent Double Catheter 77 days                Physical Exam  General: conscious, cooperative, in no acute distress  Eyes: conjunctivae pink, anicteric sclerae  ENT: lips and mucous membranes moist  Neck: supple, no JVD, no masses  Chest: clear breath sounds bilaterally, no crackles, ronchus or wheezings  CVS: distinct S1 & S2, normal rate, regular rhythm  Abdomen: soft, non-tender, non-distended, normoactive bowel sounds  Extremities: no edema of both legs  Skin: no rash  Neuro: awake, alert, oriented      Diagnostic Data:  Lab: I have personally reviewed pertinent lab results  ,   CBC:  Results from last 7 days   Lab Units 07/12/22  0459   WBC Thousand/uL 13 00*   HEMOGLOBIN g/dL 8 3*   HEMATOCRIT % 26 2*   PLATELETS Thousands/uL 211      CMP:   Lab Results   Component Value Date    SODIUM 131 (L) 07/12/2022    K 3 9 07/12/2022    CL 95 (L) 07/12/2022    CO2 28 07/12/2022    BUN 34 (H) 07/12/2022    CREATININE 2 77 (H) 07/12/2022    CALCIUM 8 1 (L) 07/12/2022    EGFR 22 07/12/2022   ,   PT/INR:   Lab Results   Component Value Date    INR 2 13 (H) 07/12/2022   ,   Magnesium: No components found for: MAG,  Phosphorous:   Lab Results   Component Value Date    PHOS 1 8 (L) 07/12/2022       Microbiology:  @[email or web address],(urinecx:7)@        PAT Lantigua    Portions of the record may have been created with voice recognition software  Occasional wrong word or "sound a like" substitutions may have occurred due to the inherent limitations of voice recognition software  Read the chart carefully and recognize, using context, where substitutions have occurred

## 2022-07-12 NOTE — PROGRESS NOTES
[address] [address]  Progress Note Rory Stain 1955, 74 y o  male MRN: [id number]  Unit/Bed#: ICU 11-01 Encounter: [id number]  Primary Care Provider: Joann Cotto DO   Date and time admitted to hospital: 7/7/2022 12:44 PM    * Diabetic infection of left foot [facility])  Assessment & Plan  · Status post incision and drainage at the bedside with Podiatry  · Continue cefazolin as per ID  · Pharmacy consult for vancomycin trough management  · Podiatry consulted ; appreciate recommendations  · Follow-up bone scan  · Will likely need TMA as wound is probed to bone with likely underlying osteomyelitis  · Infectious Diseases following    Acute respiratory failure with hypoxia ([address] )  Assessment & Plan  · Requiring 2 L of nasal cannula supplemental O2 this morning  · Possibly secondary to volume overload  · Wean O2 as tolerated  · Nephrology following ; appreciate recommendations regarding possible diuresis  · Will remove volume during HD treatments  · Goal SpO2 > 90%    Severe sepsis with lactic acidosis ([address] )  Assessment & Plan  · Resolved with IV fluid resuscitation and broad-spectrum IV antibiotics  · Secondary to diabetic foot infection present on admission with fever and leukocytosis  · Lactic acidosis resolved  · Will follow-up bone scan of left foot  · Continue IV antibiotics with cefazolin  · Infectious Diseases following  · Trend procalcitonin  · Blood cultures positive for gram-positive cocci ; repeat blood cultures with no growth    Gram-positive bacteremia  Assessment & Plan  · Source likely left diabetic toe ulcer  · Likely MSSA  · Repeat blood cultures with no growth to date  · Continue cefazolin as per ID  · Follow-up repeat blood cultures ; no growth at 48 hours  · Will need ELBERT to rule out pacemaker infection ; Cardiology following  · IR consult for chemo port removal and possible fistula removal    Type 2 diabetes mellitus with chronic kidney disease, with long-term current use of insulin (HCC)  Assessment & Plan  · Will monitor fingersticks  · Insulin sliding scale  · Lantus 10 units at bedtime  · Diabetic diet  · Will adjust diabetic regimen as needed    Persistent atrial fibrillation ([address] )  Assessment & Plan  · Will continue Lopressor  · Continue home Eliquis  · Will discontinue 48 hours prior to surgery    ESRD (end stage renal disease) [facility])  Assessment & Plan  Lab Results   Component Value Date    EGFR 22 2022    EGFR 15 2022    EGFR 14 07/10/2022    CREATININE 2 77 (H) 2022    CREATININE 3 85 (H) 2022    CREATININE 4 06 (H) 07/10/2022   · ESRD on HD Monday/Wednesday/Friday  · Nephrology following ; appreciate recommendations  · 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, sevelamer    Colon cancer [facility])  Assessment & Plan  · Patient follows with Hematology/Oncology  · Last chemo session was on 2022  · Popeye Lecher will be removed in the setting of Gram-positive bacteremia    VTE Pharmacologic Prophylaxis: Eliquis    Patient Centered Rounds: Patient care rounds were performed with nursing    Discussions with Specialists or Other Care Team Provider:  Case Management, Nursing, PT/OT    Education and Discussions with Family / Patient: Spoke with patient's wife via telephone    Time Spent for Care: 30  More than 50% of total time spent on counseling and coordination of care as described above  Current Length of Stay: 5 day(s)    Current Patient Status: Inpatient     Certification Statement: The patient will continue to require additional inpatient hospital stay due to bone scan and likely TMA with Podiatry    Discharge Plan: Pending PT/OT evaluation and treatment    Code Status: Level 1 - Full Code      Subjective:   Patient seen and examined at bedside  No acute events overnight  Denies chest pain, SOB, diaphoresis, nausea/vomiting/diarrhea, fevers/chills       Objective:     Vitals:   Temp (24hrs), Av 6 °F (37 °C), Min:97 1 °F (36 2 °C), Max:99 7 °F (37 6 °C)    Temp:  [97 1 °F (36 2 °C)-99 7 °F (37 6 °C)] 97 1 °F (36 2 °C)  HR:  [60-67] 60  Resp:  [18-20] 18  BP: (126-149)/(56-68) 126/60  SpO2:  [93 %-94 %] 94 %  Body mass index is 29 42 kg/m²  Input and Output Summary (last 24 hours): Intake/Output Summary (Last 24 hours) at 7/12/2022 1202  Last data filed at 7/12/2022 1000  Gross per 24 hour   Intake 1490 ml   Output 1800 ml   Net -310 ml       Physical Exam:     Physical Exam  Vitals and nursing note reviewed  Constitutional:       Appearance: He is well-developed  HENT:      Head: Normocephalic and atraumatic  Eyes:      Conjunctiva/sclera: Conjunctivae normal    Cardiovascular:      Rate and Rhythm: Normal rate and regular rhythm  Heart sounds: No murmur heard  Pulmonary:      Effort: Pulmonary effort is normal  No respiratory distress  Breath sounds: Normal breath sounds  Abdominal:      Palpations: Abdomen is soft  Tenderness: There is no abdominal tenderness  Musculoskeletal:      Cervical back: Neck supple  Comments: Left diabetic toe ulcer   Skin:     General: Skin is warm and dry  Neurological:      Mental Status: He is alert  Additional Data:     Labs: I have reviewed pertinent results     Results from last 7 days   Lab Units 07/12/22 0459 07/09/22 0542 07/08/22 0447   WBC Thousand/uL 13 00*   < > 16 91*   HEMOGLOBIN g/dL 8 3*   < > 8 2*   HEMATOCRIT % 26 2*   < > 25 4*   PLATELETS Thousands/uL 211   < > 172   BANDS PCT %  --   --  3   NEUTROS PCT % 79*   < >  --    LYMPHS PCT % 8*   < >  --    LYMPHO PCT %  --   --  2*   MONOS PCT % 9   < >  --    MONO PCT %  --   --  5   EOS PCT % 2   < > 0    < > = values in this interval not displayed       Results from last 7 days   Lab Units 07/12/22 0459 07/09/22 0542 07/08/22  0447 07/07/22  1300   SODIUM mmol/L 131*   < > 129* 130*   POTASSIUM mmol/L 3 9   < > 4 3 4 8   CHLORIDE mmol/L 95*   < > 95* 90*   CO2 mmol/L 28   < > 25 28   BUN mg/dL 34*   < > 45* 37*   CREATININE mg/dL 2 77*   < > 4 17* 3 53*   ANION GAP mmol/L 8   < > 9 12   CALCIUM mg/dL 8 1*   < > 8 4 8 9   ALBUMIN g/dL  --   --  3 0* 3 5   TOTAL BILIRUBIN mg/dL  --   --   --  1 10*   ALK PHOS U/L  --   --   --  93   ALT U/L  --   --   --  10   AST U/L  --   --   --  9*   GLUCOSE RANDOM mg/dL 140   < > 127 182*    < > = values in this interval not displayed  Results from last 7 days   Lab Units 07/12/22  0459   INR  2 13*     Results from last 7 days   Lab Units 07/12/22  1050 07/12/22  0721 07/11/22  2203 07/11/22  1557 07/11/22  1128 07/11/22  0802 07/10/22  2132 07/10/22  1539 07/10/22  1148 07/10/22  0720 07/09/22  2107 07/09/22  1609   POC GLUCOSE mg/dl 166* 143* 292* 231* 174* 157* 212* 268* 303* 139 217* 238*         Results from last 7 days   Lab Units 07/08/22  0447 07/07/22  1646 07/07/22  1300   LACTIC ACID mmol/L  --  1 9 3 2*   PROCALCITONIN ng/ml 1 72*  --  0 92*         Imaging: I have reviewed pertinent imaging       Recent Cultures (last 7 days):     Results from last 7 days   Lab Units 07/09/22  0929 07/09/22  0542 07/07/22  1311 07/07/22  1300   BLOOD CULTURE  No Growth at 48 hrs  No Growth at 48 hrs   Staphylococcus aureus* Staphylococcus aureus*   GRAM STAIN RESULT   --   --  Gram positive cocci in pairs, chains and clusters* Gram positive cocci in pairs, chains and clusters*       Last 24 Hours Medication List:   Current Facility-Administered Medications   Medication Dose Route Frequency Provider Last Rate    acetaminophen  650 mg Oral Q6H PRN Shi Steiner PA-C      amLODIPine  10 mg Oral Daily Henri Goldmann, MD      apixaban  5 mg Oral BID Henri Goldmann, MD      cefazolin  1,000 mg Intravenous Q24H Joann Pradhan DO 1,000 mg (07/11/22 1614)    insulin glargine  10 Units Subcutaneous HS Henri Goldmann, MD      insulin lispro  1-6 Units Subcutaneous TID [facility] Henri Goldmann, MD      loperamide  2 mg Oral Q4H PRN DO Gibson Jaime 5 mg Oral Once per day on Mon Wed Fri Latrell Jin MD      sevelamer  800 mg Oral TID With Meals Latrell Jin MD      tamsulosin  0 4 mg Oral Daily With Bryn Chino MD      torsemide  80 mg Oral Once per day on Sun Tue Thu Sat Latrell Jin MD          Today, Patient Was Seen By: Alba Murcia DO    ** Please Note: Dictation voice to text software may have been used in the creation of this document   **

## 2022-07-12 NOTE — QUICK NOTE
IR quick note    66M with cancer receiving chemotherapy who has several implanted devices  - port  - tunneled dialysis catheter  - pacemaker  - shoulder hardware    Unfortunately he is admitted with sepsis and blood cultures growing staph aureus in both sets    Source is likely foot wounds    This presents a very challenging situation with multiple devices    Given the organism intravascular devices should be removed if possible    Ideally the burden of colonized devices can be reduced    I engaged in collaborative discussion with multiple specialties over tiger text, including primary team, nephrology, hematology, infectious disease, interventional radiology    Patient requires workup for his heart including ELBERT  Blood cultures have now cleared and he is stabilizing  He is being dosed with appropriate antibiotics    - with regards to the port he will not be receiving chemotherapy for the for see able future and this can and should be removed  We will remove today  - with regards to dialysis access this is more complex as he will continue to require dialysis  With patients with end-stage renal disease it is very difficult to obtain new venous access  His left side may not be usable due to the pacemaker pocket  We should attempt to preserve venous access and save the line if possible  - unclear if there is a role for tunneled dialysis catheter removal if the pacemaker wires remain in place     - another option may be exchange over the wire      - port removal today  - I will cancel order for tunneled dialysis catheter removal at this time  - we will re-evaluate tunneled dialysis catheter after further cardiac workup

## 2022-07-12 NOTE — ASSESSMENT & PLAN NOTE
· Resolved with IV fluid resuscitation and broad-spectrum IV antibiotics  · Secondary to diabetic foot infection present on admission with fever and leukocytosis  · Lactic acidosis resolved  · Will follow-up bone scan of left foot  · Continue IV antibiotics with cefazolin  · Infectious Diseases following  · Trend procalcitonin  · Blood cultures positive for gram-positive cocci ; repeat blood cultures with no growth

## 2022-07-12 NOTE — ASSESSMENT & PLAN NOTE
· Status post incision and drainage at the bedside with Podiatry  · Continue cefazolin as per ID  · Pharmacy consult for vancomycin trough management  · Podiatry consulted ; appreciate recommendations  · Follow-up bone scan  · Planned for amputation of 2nd left toe today with Podiatry  · Infectious Diseases following

## 2022-07-12 NOTE — ASSESSMENT & PLAN NOTE
· Source likely left diabetic toe ulcer  · Patient has cleared bacteremia  · TTE with no signs of agitation  · After multi disciplinary discussion it was decided that patient is too high risk for ppm removal and will instead undergo 6 weeks of IV antibiotics in the setting of Gram-positive bacteremia with hardware in place  · Likely MSSA  · Repeat blood cultures with no growth to date  · Continue cefazolin as per ID for a 6 week course through 8/19/2022 ; 2g/2g/3g dosed after HD MWF  · Follow-up repeat blood cultures ; no growth at 48 hours  · No need for ELBERT or ppm removal at this time as per ID ; will need extended course of IV antibiotics  · Chemo port has been removed by IR as patient will no longer be undergoing chemotherapy

## 2022-07-12 NOTE — ASSESSMENT & PLAN NOTE
Lab Results   Component Value Date    EGFR 22 07/12/2022    EGFR 15 07/11/2022    EGFR 14 07/10/2022    CREATININE 2 77 (H) 07/12/2022    CREATININE 3 85 (H) 07/11/2022    CREATININE 4 06 (H) 07/10/2022   · ESRD on HD Monday/Wednesday/Friday  · Nephrology following ; appreciate recommendations  · 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, sevelamer

## 2022-07-12 NOTE — H&P (VIEW-ONLY)
[address] [facility] 1955, 74 y o  male MRN: [id number]  Unit/Bed#: ICU 11-01 Encounter: [id number]  Primary Care Provider: Yodit Quarles DO   Date and time admitted to hospital: 7/7/2022 12:44 PM    Inpatient consult to Cardiology  Consult performed by: Fadi Hernandez MD  Consult ordered by: Smith Holguin DO          Mitral stenosis  Assessment & Plan  Mild by my echo review  Aortic valve stenosis, nonrheumatic  Assessment & Plan  Mild to moderate by echo and exam     Complete heart block [facility])  Assessment & Plan  Pacemaker in place  Gram-positive bacteremia  Assessment & Plan  Asks to assess whether there is any endocarditis or pacemaker wire involvement  I reviewed the echo and that is certainly not diagnostic in either direction  I understand that a transesophageal echo is desired and I will make arrangements for this  Likely this will be performed later this week but I will be speaking to my partner who performs them regarding timing  This point the more urgent issue is wound care and possible transmetatarsal amputation well antibiotics continue  Persistent atrial fibrillation ([address] )  Assessment & Plan  Rate controlled and patient is on anticoagulation  Other summary comments: Will follow with primary service intermittently and in particular regarding timing of transesophageal echo  Outpatient Cardiologist: Has been Antonio Moreira ( He is retiring) but will be Ana María    HPI: Ron Barton is a 74y o  year old male who presented with fever  He has a left foot abscess  There is staph aureus bacteremia  Patient has a chemo port for colon cancer as well as a dialysis port  There is concern regarding seating of this as well as pacemaker leads an heart valves  We were asked to comment regarding transesophageal echo particularly as his trans thoracic echo was limited in visualizing valve definition      Patient has been following with Dr Lily Gil Mariposa for a number of years in our hospitals office  A dual-chamber Medtronic pacemaker was placed on 11/06/2018  There has been most recently by echo mild-to-moderate aortic stenosis and mild mitral stenosis  No recent chest pain or chest pressure  No syncope or near syncope  EKG:   Atrial flutter underlying  Ventricular pacing with 100% capture  MOST  RECENT CARDIAC IMAGING:   TTE 07/08/2022:  Normal LV systolic function  Mild to moderate aortic stenosis  Mild LVH  Mild mitral stenosis  Review of Systems: a 10 point review of systems was conducted and is negative except for as mentioned in the HPI or as below          Historical Information   Past Medical History:   Diagnosis Date    Anemia     iron def    Arthritis     OA    Bruise of both arms     forearms and both hands    Bruises easily     Cancer ([facility] [address] ) 09/01/2021    colon    CH[facility]congestive heart failure) ([facility])     Chronic kidney disease     CPAP (continuous positive airway pressure) dependence     Diabetes mellitus ([facility] [address] )     Diabetic foot ulcer ([facility] [address] )     Eczema     Erectile dysfunction     Gout     Heart murmur     History of permanent cardiac pacemaker placement 11/2018    History of transfusion     Hyperlipidemia     Hypertension     Hypoglycemia 03/08/2019    Murmur     Nephropathy     Osteoarthritis     Pacemaker 11/06/2018    PAD (peripheral artery disease) ([facility])     Seasonal allergies     Sleep apnea     Toe infection     bilat great toes    Vertigo     Walks frequently     Wears dentures     upper    Wears glasses      Past Surgical History:   Procedure Laterality Date    CARDIAC PACEMAKER PLACEMENT      CARPAL TUNNEL RELEASE Left     CARPAL TUNNEL RELEASE Right     CARPAL TUNNEL RELEASE      COLONOSCOPY  08/2020    COLONOSCOPY      FL GUIDED CENTRAL VENOUS ACCESS DEVICE INSERTION  01/11/2022    FOOT AMPUTATION Left 02/10/2020    Procedure: PARTIAL 1ST RAY AMPUTATION;  Surgeon: Bob Ace ILZBETH Carrillo;  [facility];  Service: Podiatry    INCISION AND DRAINAGE OF WOUND Left 01/12/2020    Procedure: INCISION AND DRAINAGE (I&D) EXTREMITY AND REMOVAL OF SESMOID BONE;  Surgeon: Taryn Duncan DPM;  [facility];  Service: Suzz[facility] [place] IR OTHER  01/21/2022    IR PICC REPOSITION  01/14/2020    IR TUNNELED DIALYSIS CATHETER PLACEMENT  04/26/2022    KNEE ARTHROSCOPY Left     KNEE ARTHROSCOPY Right     KNEE SURGERY      [place] AMPUTATION TOE,I-P JT Bilateral 05/02/2017    Procedure: PARTIAL AMPUTATION RIGHT AND LEFT HALLUX ;  Surgeon: Benjamin Palacios DPM;  [facility];  Service: Podiatry    [place] AMPUTATION TOE,MT-P JT Left 07/25/2017    Procedure: 2ND TOE AMPUTATION;  Surgeon: Benjamin Palacios DPM;  [facility];  Service: Podiatry    NY ANASTOMOSIS,AV,ANY SITE Left 6/1/2022    Procedure: CREATION FISTULA ARTERIOVENOUS (AV) RADIOCEPHALIC;  Surgeon: Brandy Cooper MD;  [facility];  Service: Vascular    NY INSJ TUNNELED CTR VAD W/SUBQ PORT AGE 5 YR/> N/A 01/11/2022    Procedure: INSERTION VENOUS PORT ( PORT-A-CATH) IR;  Surgeon: Tisha Connolly DO;  [facility];  Service: Interventional Radiology    RESECTION LOW ANTERIOR LAPAROSCOPIC N/A 10/21/2021    Procedure: RESECTION LOW ANTERIOR LAPAROSCOPIC;  Surgeon: Cj Branham MD;  [facility];  Service: Colorectal    SHOULDER ARTHROSCOPY Left     with screws,RTC    SHOULDER SURGERY Left     rotator cuff    TOE AMPUTATION Left 01/08/2020    Procedure: HALLUX AMPUTATION;  Surgeon: Taryn Duncan DPM;  [facility];  Service: Podiatry    UPPER GASTROINTESTINAL ENDOSCOPY  03/2020    Intestinal metaplasia prepyloric    VASECTOMY       Social History     Substance and Sexual Activity   Alcohol Use Never     Social History     Substance and Sexual Activity   Drug Use No     Social History     Tobacco Use   Smoking Status Former Smoker    Packs/day: 0 50    Years: 20 00    Pack years: 10 00    Types: Cigarettes    Start date: 1    Quit date:     Years since quittin 5   Smokeless Tobacco Never Used   Tobacco Comment    quit 10 years ago       Family History:   No longer relevant  Meds/Allergies   all current active meds have been reviewed  Medications Prior to Admission   Medication    allopurinol (ZYLOPRIM) 300 mg tablet    amLODIPine (NORVASC) 10 mg tablet    apixaban (ELIQUIS) 5 mg    famotidine (PEPCID) 40 MG tablet    metoprolol tartrate (LOPRESSOR) 50 mg tablet    omega-3-acid ethyl esters (LOVAZA) 1 g capsule    simvastatin (ZOCOR) 20 mg tablet    tamsulosin (FLOMAX) 0 4 mg    b complex-vitamin C-folic acid (NEPHROCAPS) 1 mg capsule    Dupilumab (Dupixent) 300 MG/2ML SOPN    Insulin Degludec-Liraglutide (Xultophy) 100 units-3 6 mg/mL injection pen    Insulin Pen Needle (BD Pen Needle Zenaida U/F) 32G X 4 MM MISC    midodrine (PROAMATINE) 5 mg tablet    ondansetron (ZOFRAN) 8 mg tablet    OneTouch Ultra test strip    sevelamer carbonate (RENVELA) 800 mg tablet    torsemide (DEMADEX) 20 mg tablet       Allergies   Allergen Reactions    Invokana [Canagliflozin] Other (See Comments)     Caused circulation problems    Lamisil [Terbinafine] Blisters     Wife states " His skin peeled from head to toe"    Other Swelling     Pomegranate - facial swelling, no swelling of tongue, esophagus  Adhesive tape   Latex Rash       Objective   Vitals: Blood pressure 126/60, pulse 60, temperature 99 7 °F (37 6 °C), temperature source Tympanic, resp  rate 18, height 5' 10" (1 778 m), weight 93 kg (205 lb 0 4 oz), SpO2 94 %  , Body mass index is 29 42 kg/m² ,   Orthostatic Blood Pressures    Flowsheet Row Most Recent Value   Blood Pressure 126/60 filed at 2022 0700   Patient Position - Orthostatic VS Lying filed at 2022 4984          Systolic (32DGO), BOK:099 , Min:123 , AGZ:129     Diastolic (62RXB), SXZ:02, Min:56, Max:68              Physical Exam:    General:  Normal appearance in no distress  Eyes:  Anicteric  Oral mucosa:  Moist   Neck:  No JVD  Carotid upstrokes are brisk without bruits  No masses  Recent fistula left arm not yet being used  Chest:  Clear to auscultation  Cardiac:  No palpable PMI  Normal S1 and S2   Grade 2-3 systolic murmur heard throughout the chest   No diastolic murmur  No  gallop or rub  Abdomen:  Soft and nontender  No palpable organomegaly or aortic enlargement  Extremities:  No peripheral edema  Musculoskeletal:  Symmetric  Vascular:  Femoral pulses are brisk without bruits  Popliteal  pulses are intact bilaterally  Left foot is wrapped  Neuro:  Grossly symmetric  Psych:  Alert and oriented x3  Lab Results:   June  Troponins:      BNP:   Results from last 6 Months   Lab Units 01/21/22  1359   BNP pg/mL 1,067*       CBC :   Results from last 7 days   Lab Units 07/12/22  0459 07/11/22  0549   WBC Thousand/uL 13 00* 12 75*   HEMOGLOBIN g/dL 8 3* 8 4*   HEMATOCRIT % 26 2* 25 7*   MCV fL 103* 101*   PLATELETS Thousands/uL 211 200     TSH:     CMP:   Results from last 7 days   Lab Units 07/12/22  0459 07/11/22  0549 07/08/22  0447 07/07/22  1300   POTASSIUM mmol/L 3 9 3 7   < > 4 8   CHLORIDE mmol/L 95* 95*   < > 90*   CO2 mmol/L 28 26   < > 28   BUN mg/dL 34* 62*   < > 37*   CREATININE mg/dL 2 77* 3 85*   < > 3 53*   AST U/L  --   --   --  9*   ALT U/L  --   --   --  10   EGFR ml/min/1 73sq m 22 15   < > 16    < > = values in this interval not displayed       Lipid Profile:     Coags:   Results from last 7 days   Lab Units 07/12/22  0459 07/07/22  1300   INR  2 13* 2 15*

## 2022-07-12 NOTE — ASSESSMENT & PLAN NOTE
· Patient follows with Hematology/Oncology  · Last chemo session was on 07/05/2022  · Chemo-port will be removed in the setting of Gram-positive bacteremia

## 2022-07-13 ENCOUNTER — APPOINTMENT (INPATIENT)
Dept: NUCLEAR MEDICINE | Facility: HOSPITAL | Age: 67
DRG: 853 | End: 2022-07-13
Payer: MEDICARE

## 2022-07-13 ENCOUNTER — APPOINTMENT (INPATIENT)
Dept: DIALYSIS | Facility: HOSPITAL | Age: 67
DRG: 853 | End: 2022-07-13
Attending: INTERNAL MEDICINE
Payer: MEDICARE

## 2022-07-13 LAB
ANION GAP SERPL CALCULATED.3IONS-SCNC: 9 MMOL/L (ref 4–13)
BASOPHILS # BLD AUTO: 0.02 THOUSANDS/ΜL (ref 0–0.1)
BASOPHILS # BLD AUTO: 0.03 THOUSANDS/ΜL (ref 0–0.1)
BASOPHILS NFR BLD AUTO: 0 % (ref 0–1)
BASOPHILS NFR BLD AUTO: 0 % (ref 0–1)
BUN SERPL-MCNC: 40 MG/DL (ref 5–25)
CALCIUM SERPL-MCNC: 8.5 MG/DL (ref 8.4–10.2)
CHLORIDE SERPL-SCNC: 97 MMOL/L (ref 96–108)
CO2 SERPL-SCNC: 27 MMOL/L (ref 21–32)
CREAT SERPL-MCNC: 3.13 MG/DL (ref 0.6–1.3)
EOSINOPHIL # BLD AUTO: 0.3 THOUSAND/ΜL (ref 0–0.61)
EOSINOPHIL # BLD AUTO: 0.4 THOUSAND/ΜL (ref 0–0.61)
EOSINOPHIL NFR BLD AUTO: 2 % (ref 0–6)
EOSINOPHIL NFR BLD AUTO: 3 % (ref 0–6)
ERYTHROCYTE [DISTWIDTH] IN BLOOD BY AUTOMATED COUNT: 17.3 % (ref 11.6–15.1)
ERYTHROCYTE [DISTWIDTH] IN BLOOD BY AUTOMATED COUNT: 17.5 % (ref 11.6–15.1)
GFR SERPL CREATININE-BSD FRML MDRD: 19 ML/MIN/1.73SQ M
GLUCOSE SERPL-MCNC: 111 MG/DL (ref 65–140)
GLUCOSE SERPL-MCNC: 116 MG/DL (ref 65–140)
GLUCOSE SERPL-MCNC: 189 MG/DL (ref 65–140)
GLUCOSE SERPL-MCNC: 194 MG/DL (ref 65–140)
GLUCOSE SERPL-MCNC: 283 MG/DL (ref 65–140)
HCT VFR BLD AUTO: 25.1 % (ref 36.5–49.3)
HCT VFR BLD AUTO: 29.1 % (ref 36.5–49.3)
HGB BLD-MCNC: 8 G/DL (ref 12–17)
HGB BLD-MCNC: 9.3 G/DL (ref 12–17)
IMM GRANULOCYTES # BLD AUTO: 0.29 THOUSAND/UL (ref 0–0.2)
IMM GRANULOCYTES # BLD AUTO: 0.3 THOUSAND/UL (ref 0–0.2)
IMM GRANULOCYTES NFR BLD AUTO: 2 % (ref 0–2)
IMM GRANULOCYTES NFR BLD AUTO: 2 % (ref 0–2)
LYMPHOCYTES # BLD AUTO: 0.83 THOUSANDS/ΜL (ref 0.6–4.47)
LYMPHOCYTES # BLD AUTO: 1.04 THOUSANDS/ΜL (ref 0.6–4.47)
LYMPHOCYTES NFR BLD AUTO: 5 % (ref 14–44)
LYMPHOCYTES NFR BLD AUTO: 8 % (ref 14–44)
MAGNESIUM SERPL-MCNC: 1.9 MG/DL (ref 1.9–2.7)
MCH RBC QN AUTO: 32.7 PG (ref 26.8–34.3)
MCH RBC QN AUTO: 32.7 PG (ref 26.8–34.3)
MCHC RBC AUTO-ENTMCNC: 31.9 G/DL (ref 31.4–37.4)
MCHC RBC AUTO-ENTMCNC: 32 G/DL (ref 31.4–37.4)
MCV RBC AUTO: 102 FL (ref 82–98)
MCV RBC AUTO: 103 FL (ref 82–98)
MONOCYTES # BLD AUTO: 1.05 THOUSAND/ΜL (ref 0.17–1.22)
MONOCYTES # BLD AUTO: 1.06 THOUSAND/ΜL (ref 0.17–1.22)
MONOCYTES NFR BLD AUTO: 7 % (ref 4–12)
MONOCYTES NFR BLD AUTO: 8 % (ref 4–12)
NEUTROPHILS # BLD AUTO: 10.16 THOUSANDS/ΜL (ref 1.85–7.62)
NEUTROPHILS # BLD AUTO: 13.17 THOUSANDS/ΜL (ref 1.85–7.62)
NEUTS SEG NFR BLD AUTO: 79 % (ref 43–75)
NEUTS SEG NFR BLD AUTO: 84 % (ref 43–75)
NRBC BLD AUTO-RTO: 0 /100 WBCS
NRBC BLD AUTO-RTO: 0 /100 WBCS
PHOSPHATE SERPL-MCNC: 2.7 MG/DL (ref 2.3–4.1)
PLATELET # BLD AUTO: 213 THOUSANDS/UL (ref 149–390)
PLATELET # BLD AUTO: 228 THOUSANDS/UL (ref 149–390)
PMV BLD AUTO: 10 FL (ref 8.9–12.7)
PMV BLD AUTO: 10.4 FL (ref 8.9–12.7)
POTASSIUM SERPL-SCNC: 3.7 MMOL/L (ref 3.5–5.3)
RBC # BLD AUTO: 2.45 MILLION/UL (ref 3.88–5.62)
RBC # BLD AUTO: 2.84 MILLION/UL (ref 3.88–5.62)
SODIUM SERPL-SCNC: 133 MMOL/L (ref 135–147)
WBC # BLD AUTO: 12.96 THOUSAND/UL (ref 4.31–10.16)
WBC # BLD AUTO: 15.69 THOUSAND/UL (ref 4.31–10.16)

## 2022-07-13 PROCEDURE — 87040 BLOOD CULTURE FOR BACTERIA: CPT | Performed by: INTERNAL MEDICINE

## 2022-07-13 PROCEDURE — 93925 LOWER EXTREMITY STUDY: CPT | Performed by: SURGERY

## 2022-07-13 PROCEDURE — 80048 BASIC METABOLIC PNL TOTAL CA: CPT | Performed by: INTERNAL MEDICINE

## 2022-07-13 PROCEDURE — 83735 ASSAY OF MAGNESIUM: CPT | Performed by: INTERNAL MEDICINE

## 2022-07-13 PROCEDURE — 99232 SBSQ HOSP IP/OBS MODERATE 35: CPT | Performed by: INTERNAL MEDICINE

## 2022-07-13 PROCEDURE — 84100 ASSAY OF PHOSPHORUS: CPT | Performed by: INTERNAL MEDICINE

## 2022-07-13 PROCEDURE — 85025 COMPLETE CBC W/AUTO DIFF WBC: CPT | Performed by: INTERNAL MEDICINE

## 2022-07-13 PROCEDURE — 99232 SBSQ HOSP IP/OBS MODERATE 35: CPT | Performed by: STUDENT IN AN ORGANIZED HEALTH CARE EDUCATION/TRAINING PROGRAM

## 2022-07-13 PROCEDURE — 82948 REAGENT STRIP/BLOOD GLUCOSE: CPT

## 2022-07-13 RX ORDER — POLYETHYLENE GLYCOL 3350 17 G/17G
17 POWDER, FOR SOLUTION ORAL DAILY PRN
Status: DISCONTINUED | OUTPATIENT
Start: 2022-07-13 | End: 2022-07-17 | Stop reason: HOSPADM

## 2022-07-13 RX ORDER — AMOXICILLIN 250 MG
1 CAPSULE ORAL
Status: DISCONTINUED | OUTPATIENT
Start: 2022-07-13 | End: 2022-07-17 | Stop reason: HOSPADM

## 2022-07-13 RX ORDER — INSULIN LISPRO 100 [IU]/ML
1-6 INJECTION, SOLUTION INTRAVENOUS; SUBCUTANEOUS
Status: DISCONTINUED | OUTPATIENT
Start: 2022-07-13 | End: 2022-07-17 | Stop reason: HOSPADM

## 2022-07-13 RX ADMIN — TAMSULOSIN HYDROCHLORIDE 0.4 MG: 0.4 CAPSULE ORAL at 20:45

## 2022-07-13 RX ADMIN — INSULIN LISPRO 4 UNITS: 100 INJECTION, SOLUTION INTRAVENOUS; SUBCUTANEOUS at 11:31

## 2022-07-13 RX ADMIN — INSULIN LISPRO 1 UNITS: 100 INJECTION, SOLUTION INTRAVENOUS; SUBCUTANEOUS at 22:00

## 2022-07-13 RX ADMIN — SEVELAMER HYDROCHLORIDE 800 MG: 800 TABLET, FILM COATED PARENTERAL at 11:31

## 2022-07-13 RX ADMIN — APIXABAN 5 MG: 5 TABLET, FILM COATED ORAL at 08:34

## 2022-07-13 RX ADMIN — INSULIN GLARGINE 10 UNITS: 100 INJECTION, SOLUTION SUBCUTANEOUS at 21:48

## 2022-07-13 RX ADMIN — SENNOSIDES AND DOCUSATE SODIUM 1 TABLET: 50; 8.6 TABLET ORAL at 21:48

## 2022-07-13 RX ADMIN — SEVELAMER HYDROCHLORIDE 800 MG: 800 TABLET, FILM COATED PARENTERAL at 18:30

## 2022-07-13 RX ADMIN — APIXABAN 5 MG: 5 TABLET, FILM COATED ORAL at 20:45

## 2022-07-13 RX ADMIN — MIDODRINE HYDROCHLORIDE 5 MG: 5 TABLET ORAL at 08:34

## 2022-07-13 RX ADMIN — INSULIN LISPRO 2 UNITS: 100 INJECTION, SOLUTION INTRAVENOUS; SUBCUTANEOUS at 18:29

## 2022-07-13 RX ADMIN — CEFAZOLIN SODIUM 1000 MG: 1 SOLUTION INTRAVENOUS at 18:30

## 2022-07-13 RX ADMIN — AMLODIPINE BESYLATE 10 MG: 10 TABLET ORAL at 21:48

## 2022-07-13 RX ADMIN — SEVELAMER HYDROCHLORIDE 800 MG: 800 TABLET, FILM COATED PARENTERAL at 07:44

## 2022-07-13 NOTE — ASSESSMENT & PLAN NOTE
I spoke further with patient and he is quite reluctant to undergo ELBERT/pacer explant, valve procedure etc   I also note quit clearing of bacteremia  Patient understands that there is some risk of recurrence of bacteremia and complications from this but we settled on repeat blood cultures after antibiotics and then to reconsider  He was quite clear that he would say no to further invasive cardiac procedures at present however  All this was also reviewed with Infectious Disease and we settled on 6 weeks of appropriate antibiotics and then surveillance blood cultures  If then positive all the above can be revisited

## 2022-07-13 NOTE — PLAN OF CARE
Problem: MUSCULOSKELETAL - ADULT  Goal: Maintain or return mobility to safest level of function  Description: INTERVENTIONS:  - Assess patient's ability to carry out ADLs; assess patient's baseline for ADL function and identify physical deficits which impact ability to perform ADLs (bathing, care of mouth/teeth, toileting, grooming, dressing, etc )  - Assess/evaluate cause of self-care deficits   - Assess range of motion  - Assess patient's mobility  - Assess patient's need for assistive devices and provide as appropriate  - Encourage maximum independence but intervene and supervise when necessary  - Involve family in performance of ADLs  - Assess for home care needs following discharge   - Consider OT consult to assist with ADL evaluation and planning for discharge  - Provide patient education as appropriate  Outcome: Progressing  Goal: Maintain proper alignment of affected body part  Description: INTERVENTIONS:  - Support, maintain and protect limb and body alignment  - Provide patient/ family with appropriate education  Outcome: Progressing     Problem: GENITOURINARY - ADULT  Goal: Maintains or returns to baseline urinary function  Description: INTERVENTIONS:  - Assess urinary function  - Encourage oral fluids to ensure adequate hydration if ordered  - Administer IV fluids as ordered to ensure adequate hydration  - Administer ordered medications as needed  - Offer frequent toileting  - Follow urinary retention protocol if ordered  Outcome: Progressing  Goal: Absence of urinary retention  Description: INTERVENTIONS:  - Assess patients ability to void and empty bladder  - Monitor I/O  - Bladder scan as needed  - Discuss with physician/AP medications to alleviate retention as needed  - Discuss catheterization for long term situations as appropriate  Outcome: Progressing     Problem: METABOLIC, FLUID AND ELECTROLYTES - ADULT  Goal: Electrolytes maintained within normal limits  Description: INTERVENTIONS:  - Monitor labs and assess patient for signs and symptoms of electrolyte imbalances  - Administer electrolyte replacement as ordered  - Monitor response to electrolyte replacements, including repeat lab results as appropriate  - Instruct patient on fluid and nutrition as appropriate  7/13/2022 1947 by Harriet Henao RN  Outcome: Progressing  7/13/2022 1946 by Harriet Henao RN  Outcome: Progressing  Goal: Fluid balance maintained  Description: INTERVENTIONS:  - Monitor labs   - Monitor I/O and WT  - Instruct patient on fluid and nutrition as appropriate  - Assess for signs & symptoms of volume excess or deficit  7/13/2022 1947 by Harriet Henao RN  Outcome: Progressing  7/13/2022 1946 by Harriet Henao RN  Outcome: Progressing  Goal: Glucose maintained within target range  Description: INTERVENTIONS:  - Monitor Blood Glucose as ordered  - Assess for signs and symptoms of hyperglycemia and hypoglycemia  - Administer ordered medications to maintain glucose within target range  - Assess nutritional intake and initiate nutrition service referral as needed  Outcome: Progressing     Problem: INFECTION - ADULT  Goal: Absence or prevention of progression during hospitalization  Description: INTERVENTIONS:  - Assess and monitor for signs and symptoms of infection  - Monitor lab/diagnostic results  - Monitor all insertion sites, i e  indwelling lines, tubes, and drains  - Monitor endotracheal if appropriate and nasal secretions for changes in amount and color  - Anderson appropriate cooling/warming therapies per order  - Administer medications as ordered  - Instruct and encourage patient and family to use good hand hygiene technique  - Identify and instruct in appropriate isolation precautions for identified infection/condition  7/13/2022 1947 by Harriet Henao RN  Outcome: Progressing  7/13/2022 1946 by Harriet Henao RN  Outcome: Progressing  Goal: Absence of fever/infection during neutropenic period  Description: INTERVENTIONS:  - Monitor WBC    7/13/2022 1947 by Lencho Armando, RN  Outcome: Progressing  7/13/2022 1946 by Lencho Armando, RN  Outcome: Progressing

## 2022-07-13 NOTE — PROGRESS NOTES
[address]  Progress Note Danilo Calabrese 1955, 74 y o  male MRN: [id number]  Unit/Bed#: ICU 11-01 Encounter: [id number]  Primary Care Provider: Ayesha Smith,    Date and time admitted to hospital: 7/7/2022 12:44 PM    Mitral stenosis  Assessment & Plan  Mild by my echo review  Aortic valve stenosis, nonrheumatic  Assessment & Plan  Mild to moderate by echo and exam     Complete heart block [facility])  Assessment & Plan  Pacemaker in place  Gram-positive bacteremia  Assessment & Plan  I spoke further with patient and he is quite reluctant to undergo ELBERT/pacer explant, valve procedure etc   I also note quit clearing of bacteremia  Patient understands that there is some risk of recurrence of bacteremia and complications from this but we settled on repeat blood cultures after antibiotics and then to reconsider  He was quite clear that he would say no to further invasive cardiac procedures at present however  All this was also reviewed with Infectious Disease and we settled on 6 weeks of appropriate antibiotics and then surveillance blood cultures  If then positive all the above can be revisited  Persistent atrial fibrillation ([address] )  Assessment & Plan  Rate controlled and patient is on anticoagulation  Outpatient Cardiologist: Will be Ana María    A dual-chamber Medtronic pacemaker was placed on 11/06/2018  There has been most recently by echo mild-to-moderate aortic stenosis and mild mitral stenosis  Subjective:   Patient seen and examined  No significant events overnight  Feels well  Summary comments:  Infusion port has been removed  He continues on dialysis and consideration for transmetatarsal amputation is ongoing  See above comments regarding length of antibiotic therapy and consideration of transesophageal echo only if there is persistent or recurrent bacteremia     Telemetry/ECG/Cardiac testing:   TTE 07/08/2022:  Normal LV systolic function    Mild to moderate aortic stenosis  Mild LVH  Mild mitral stenosis  Vitals: Blood pressure 143/63, pulse 61, temperature 97 6 °F (36 4 °C), temperature source Tympanic, resp  rate 18, height 5' 10" (1 778 m), weight 93 kg (205 lb 0 4 oz), SpO2 90 %  ,   Orthostatic Blood Pressures    Flowsheet Row Most Recent Value   Blood Pressure 143/63 filed at 07/13/2022 0700   Patient Position - Orthostatic VS Lying filed at 07/13/2022 0700      ,   Weight (last 2 days)     Date/Time Weight    07/11/22 0400 93 (205 03)          Physical Exam:    General:  Normal appearance in no distress  Eyes:  Anicteric  Oral mucosa:  Moist   Neck:  No JVD  Carotid upstrokes are brisk without bruits  No masses  Recent fistula left arm not yet being used  Chest:  Clear to auscultation  Cardiac:  No palpable PMI  Normal S1 and S2   Grade 2-3 systolic murmur heard throughout the chest   No diastolic murmur  No  gallop or rub  Abdomen:  Soft and nontender  No palpable organomegaly or aortic enlargement  Extremities:  No peripheral edema  Musculoskeletal:  Symmetric  Vascular:  Femoral pulses are brisk without bruits  Popliteal  pulses are intact bilaterally  Left foot is wrapped  Neuro:  Grossly symmetric  Psych:  Alert and oriented x3          Medications:      Current Facility-Administered Medications:     acetaminophen (TYLENOL) tablet 650 mg, 650 mg, Oral, Q6H PRN, Roxane Patten PA-C, 650 mg at 07/10/22 1439    amLODIPine (NORVASC) tablet 10 mg, 10 mg, Oral, Daily, Jana Cornejo MD, 10 mg at 07/12/22 2113    apixaban (ELIQUIS) tablet 5 mg, 5 mg, Oral, BID, Jana Cornejo MD, 5 mg at 07/13/22 0834    ceFAZolin (ANCEF) IVPB (premix in dextrose) 1,000 mg 50 mL, 1,000 mg, Intravenous, Q24H, Gonzales Pedraza DO, Last Rate: 100 mL/hr at 07/12/22 1712, 1,000 mg at 07/12/22 1712    insulin glargine (LANTUS) subcutaneous injection 10 Units 0 1 mL, 10 Units, Subcutaneous, HS, Jana Cornejo MD, 10 Units at 07/12/22 2116    insulin lispro (HumaLOG) 100 units/mL subcutaneous injection 1-6 Units, 1-6 Units, Subcutaneous, TID AC, Yun Carr MD, 1 Units at 07/12/22 1713    loperamide (IMODIUM) capsule 2 mg, 2 mg, Oral, Q4H PRN, Marie Bird DO, 2 mg at 07/09/22 1415    midodrine (PROAMATINE) tablet 5 mg, 5 mg, Oral, Once per day on Mon Wed Fri, Yun Carr MD, 5 mg at 07/13/22 9064    sevelamer (RENAGEL) tablet 800 mg, 800 mg, Oral, TID With Meals, Yun Carr MD, 800 mg at 07/13/22 0744    tamsulosin (FLOMAX) capsule 0 4 mg, 0 4 mg, Oral, Daily With Sally Brennan MD, 0 4 mg at 07/12/22 1712    torsemide [facility]) tablet 80 mg, 80 mg, Oral, Once per day on Sun Tue Thu Sat, Yun Carr MD, 80 mg at 07/12/22 0826     Labs & Results:    Troponins:         BNP:   Results from last 6 Months   Lab Units 01/21/22  1359   BNP pg/mL 1,067*     CBC with diff:   Results from last 7 days   Lab Units 07/13/22  0444 07/12/22  0459   WBC Thousand/uL 12 96* 13 00*   HEMOGLOBIN g/dL 8 0* 8 3*   HEMATOCRIT % 25 1* 26 2*   MCV fL 102* 103*   PLATELETS Thousands/uL 213 211     TSH:     CMP:   Results from last 7 days   Lab Units 07/13/22  0444 07/12/22  0459 07/08/22  0447 07/07/22  1300   POTASSIUM mmol/L 3 7 3 9   < > 4 8   CHLORIDE mmol/L 97 95*   < > 90*   CO2 mmol/L 27 28   < > 28   BUN mg/dL 40* 34*   < > 37*   CREATININE mg/dL 3 13* 2 77*   < > 3 53*   AST U/L  --   --   --  9*   ALT U/L  --   --   --  10   EGFR ml/min/1 73sq m 19 22   < > 16    < > = values in this interval not displayed       Lipid Profile:     Coags:   Results from last 7 days   Lab Units 07/12/22  0459 07/07/22  1300   INR  2 13* 2 15*

## 2022-07-13 NOTE — PROGRESS NOTES
REQUIRED DOCUMENTATION:      1  This service was provided via Telemedicine  2  Provider located at [facility]  3  TeleMed provider: Maggie Mercado MD  4  Identify all parties in room with patient during tele consult:  RN  5  After connecting through [place], patient was identified by name and date of birth and assistant checked wristband  Patient was then informed that this was a Telemedicine visit and that the exam was being conducted confidentially over secure lines  My office door was closed  No one else was in the room  Patient acknowledged consent and understanding of privacy and security of the Telemedicine visit, and gave us permission to have the assistant stay in the room in order to assist with the history and to conduct the exam   I informed the patient that I have reviewed their record in Epic and presented the opportunity for them to ask any questions regarding the visit today  The patient agreed to participate  Progress Note - Infectious Disease   Jose Leyva 74 y o  male MRN: [id number]  Unit/Bed#: ICU 11-01 Encounter: [id number]      Impression/Plan:    1  Sepsis, present on admission  Fever, leukocytosis  Due to MSSA bacteremia 2/2 left foot cellulitis  Patient clinically improving, afebrile without leukocytosis   -antibiotics as below   -monitor WBC count, fever curve     2  MSSA bacteremia  Due to left foot cellulitis  Repeat blood cultures negative  Challenging situation as patient has underlying PPM, left shoulder hardware, right chest diaylsis catheter and PortACath  Multidisciplinary discussion held regarding best management  TTE no vegetations but not diagnostic  Per discussions with patient and cardiology, then patient would not like to undergo major surgery, PPM removal  He would favor empiric treatment course and then invasive testing/treatments (ELBERT, PPM if infection relapses)   PortACath removed 7/12/22   -continue IV Cefazolin 1g q24hr dosed after HD on HD days    -anticipate a 6 week course of IV antibiotics with surveillance blood cultures 1 week after completion of antibiotics    -will need source control of left foot infection   -ideally HD catheter would also be removed or exchanged; will await cultures from line  No urgency since blood cultures cleared on antibiotics and patient is stable      3  Left foot cellulitis and abscess  Status post bedside debridement by Podiatry 7/10  Cerectec scan nondiagnostic of osteomyelitis    -per Podiatry will likely need further intervention, possible TMA     4  Type 2 diabetes  Risk factor for infection, poor wound healing    -Recommend strict glycemic control to aid with wound healing     5  Sigmoid cancer  Last cycle chemotherapy was  via R chest port-a-cath  Risk factor for immune suppression    -Management per Heme-Onc     6  ESRD on dialysis  Via R permacath with no local signs of infection, however in setting of staph aureus bacteremia would consider line contaminated     -Management per nephrology       Above management plan discussed in detail with the primary team   ID will follow  I spent 35 minutes in evaluation of the patient of which 20 minutes was in counseling/coordination of care    Antibiotics:  Abx day 7  Cefazolin day 3    Subjective:  Patient feeling better since admission  No fever, chills, chest pain, diarrhea, pain in his feet  Objective:  Vitals:  Temp:  [97 6 °F (36 4 °C)-99 4 °F (37 4 °C)] 98 3 °F (36 8 °C)  HR:  [60-64] 60  Resp:  [16-18] 16  BP: (123-151)/(58-74) 123/74  SpO2:  [90 %-94 %] 90 %  Temp (24hrs), Av 3 °F (36 8 °C), Min:97 6 °F (36 4 °C), Max:99 4 °F (37 4 °C)  Current: Temperature: 98 3 °F (36 8 °C)    Physical Exam:     Documented physical exam has been primarily done by the patient's nurse and/or the primary service due to limited examination abilities on telemedicine     General Appearance:  Alert, interactive, nontoxic, no acute distress     Throat: Oropharynx moist without lesions  Lungs:   Clear to auscultation bilaterally; no wheezes, rhonchi or rales; respirations unlabored   Heart:  RRR; no murmur, rub or gallop   Abdomen:   Soft, non-tender, non-distended, positive bowel sounds  Extremities: No clubbing, cyanosis or edema   Skin: Pacemaker, port and permacath site C/D/I  Left foot diffuse erythema, superficial wounds over L 2nd and 3rd toes, s/p partial 2nd toe amputation and first toe amputation       Labs: All pertinent labs and imaging studies were personally reviewed  Results from last 7 days   Lab Units 07/13/22  1354 07/13/22  0444 07/12/22  0459   WBC Thousand/uL 15 69* 12 96* 13 00*   HEMOGLOBIN g/dL 9 3* 8 0* 8 3*   PLATELETS Thousands/uL 228 213 211     Results from last 7 days   Lab Units 07/13/22  0444 07/12/22  0459 07/11/22  0549 07/08/22  0447 07/07/22  1300   SODIUM mmol/L 133* 131* 131*   < > 130*   POTASSIUM mmol/L 3 7 3 9 3 7   < > 4 8   CHLORIDE mmol/L 97 95* 95*   < > 90*   CO2 mmol/L 27 28 26   < > 28   BUN mg/dL 40* 34* 62*   < > 37*   CREATININE mg/dL 3 13* 2 77* 3 85*   < > 3 53*   EGFR ml/min/1 73sq m 19 22 15   < > 16   CALCIUM mg/dL 8 5 8 1* 8 3*   < > 8 9   AST U/L  --   --   --   --  9*   ALT U/L  --   --   --   --  10   ALK PHOS U/L  --   --   --   --  93    < > = values in this interval not displayed  Results from last 7 days   Lab Units 07/08/22  0447 07/07/22  1300   PROCALCITONIN ng/ml 1 72* 0 92*                   Micro:  Results from last 7 days   Lab Units 07/09/22  0929 07/09/22  0542 07/08/22  2009 07/07/22  1311 07/07/22  1300   BLOOD CULTURE  No Growth After 4 Days  No Growth After 4 Days    --  Staphylococcus aureus* Staphylococcus aureus*   GRAM STAIN RESULT   --   --   --  Gram positive cocci in pairs, chains and clusters* Gram positive cocci in pairs, chains and clusters*   MRSA CULTURE ONLY   --   --  No Methicillin Resistant Staphlyococcus aureus (MRSA) isolated  --   --        Imaging:  Imaging in PACS personally reviewed

## 2022-07-13 NOTE — PROGRESS NOTES
Progress Note - Nephrology   Hollie Ling 74 y o  male MRN: [id number]  Unit/Bed#: ICU 11-01 Encounter: [id number]    A/P:  1  ESRD hemodialysis dependent MWF   - will have a routine treatment today   - will ask dialysis nurse to culture [employer] today   - estimated dry weight is  90 kg  - Weight today (standing scale) is 91 3 kg - will UF 2 kg due to visible dyspnea    2  MSSA bacteremia   - source likely diabetic foot ulcer   - may have osteomyelitis and will need 6 weeks of IV antibiotics   - these can be provided at the dialysis unit with treatment   - his PermCath was NOT removed  His chemo port was removed   - will culture the PermCath to check for biofilm infection in an effort to salvage the PermCath   - I explained this to him and his wife on the phone and discussed with Dr Roberto Mera today   - Receiving IV cefazolin 1000mg q 24 hours    3  Complete heart block    - has a PPMI   - Hardware will not be removed as per Cardiology discussion    4  Diabetes mellitus - 2 with long term current use of insulin   - continue to cover sugars with insulin    5  Persistent atrial fibrillation   - has good rate control    6  Severe sepsis with lactic acidosis   - resolved    7  Secondary hyperparathyroidism of renal origin   - receiving sevelamer 800 mg 3 times a day with meals    8  Anemia o of CKD   - No Epogen due to colon cancer   - no iron IV due to infection    8  Intra dialytic  hypotension   - receiving midodrine 5 mg p o  before dialysis        Follow up reason for today's visit: ESRD/ MSSA bacteremia/diabetic foot ulcer/ sepsis    Diabetic infection of left foot [facility])    Patient Active Problem List   Diagnosis    Bilateral leg edema    Diabetic ulcer of toe of left foot associated with type 2 diabetes mellitus ([facility] )    Easy bruising    Eczema    Erectile dysfunction of non-organic origin    Hyperlipidemia    Uremic acidosis    Arthritis    Peripheral arterial disease ([facility] )    PVCs (premature ventricular contractions)    Rhinitis    Systolic murmur    Vertigo    Complete heart block (HCC)    Metabolic acidosis    ELZBIETA (acute kidney injury) (HCC)    Other hyperlipidemia    Persistent atrial fibrillation (HCC)    Persistent proteinuria    Volume overload    Urinary retention    NICOLASA (obstructive sleep apnea)    Type 2 diabetes mellitus with chronic kidney disease, with long-term current use of insulin (HCC)    Hypertension    Stage 4 chronic kidney disease (HCC)    Aortic valve stenosis, nonrheumatic    Anemia    Fever    Mitral stenosis    Abnormal CT of the chest    Hyperkalemia    Acute pulmonary edema (HCC)    Chronic diastolic (congestive) heart failure (HCC)    Left renal artery stenosis (HCC)    S/P amputation of lesser toe, left (HCC)    S/P amputation of lesser toe, right (HCC)    Elevated troponin I level    Staphylococcus aureus bacteremia    Type 2 diabetes mellitus with diabetic neuropathy, without long-term current use of insulin (HCC)    Hyponatremia    Iron deficiency anemia    Anxiety    Osteomyelitis of left foot (HCC)    Amputation of left great toe (HCC)    Multiple drug resistant organism (MDRO) culture positive    Renovascular hypertension    SSS (sick sinus syndrome) (HCC)    Chronic obstructive pulmonary disease (HCC)    Absence of toe (HCC)    Chronic painful diabetic neuropathy (HCC)    Onychomycosis    Colon cancer (HCC)    Acute kidney injury superimposed on chronic kidney disease ([address] )    RSV (respiratory syncytial virus pneumonia)    Chemotherapy-induced neutropenia (HCC)    Chemotherapy-induced nausea    Acute on chronic diastolic congestive heart failure (HCC)    Other iron deficiency anemias    Pulmonary hypertension (HCC)    S/P arteriovenous (AV) fistula creation    Severe sepsis with lactic acidosis (HCC)    Diabetic infection of left foot (HCC)    Gram-positive bacteremia    Acute respiratory failure with hypoxia ([address] )    ESRD (end stage renal disease) ([facility] )         Subjective:   He is a little dyspneic but otherwise a 10 point ROS is neegative    Objective:     Vitals: Blood pressure 143/63, pulse 61, temperature 98 8 °F (37 1 °C), temperature source Tympanic, resp  rate 18, height 5' 10" (1 778 m), weight 93 kg (205 lb 0 4 oz), SpO2 90 %  ,Body mass index is 29 42 kg/m²  Weight (last 2 days)     Date/Time Weight    07/11/22 0400 93 (205 03)            Intake/Output Summary (Last 24 hours) at 7/13/2022 1227  Last data filed at 7/13/2022 0820  Gross per 24 hour   Intake 540 ml   Output 0 ml   Net 540 ml     I/O last 3 completed shifts: In: 480 [P O :480]  Out: 0     HD Permanent Double Catheter (Active)   Reasons to continue HD Cath Treatment Therapy 07/12/22 0800   Goal for Removal N/A- chronic HD catheter 07/10/22 2000   Line Necessity Reviewed Yes, reviewed with provider 07/11/22 1310   Site Assessment WDL 07/12/22 0800   Proximal Lumen Status Normal saline locked 07/11/22 1900   Distal Lumen Status Normal saline locked 07/12/22 0800   Dressing Type Chlorhexidine dressing 07/12/22 0800   Dressing Status Clean;Dry; Intact 07/12/22 0800   Dressing Intervention Dressing changed 07/08/22 1300   Dressing Change Due 07/15/22 07/11/22 1310       Physical Exam: /63 (BP Location: Right arm)   Pulse 61   Temp 98 8 °F (37 1 °C) (Tympanic)   Resp 18   Ht 5' 10" (1 778 m)   Wt 93 kg (205 lb 0 4 oz)   SpO2 90%   BMI 29 42 kg/m²     General Appearance:    Alert, cooperative, no distress, appears stated age   Head:    Normocephalic, without obvious abnormality, atraumatic   Eyes:    Conjunctiva/corneas clear   Ears:    Normal external ears   Nose:   Nares normal, septum midline, mucosa normal, no drainage    or sinus tenderness   Throat:   Lips, mucosa, and tongue normal; teeth and gums normal   Neck:   Supple, symmetrical, trachea midline, no adenopathy;        thyroid:  No enlargement/tenderness/nodules; no carotid    bruit or JVD Back:     Symmetric, no curvature, ROM normal, no CVA tenderness   Lungs:     Decreased to auscultation bilaterally, respirations unlabored   Chest wall:    No tenderness or deformity PermCath right chest   Heart:    Regular rate and rhythm, S1 and S2 normal, systolico murmur, rub   or gallop   Abdomen:     Soft, non-tender, bowel sounds active   Extremities:   Extremities normal, atraumatic, no cyanosis or edema   Skin:   Skin color, texture, turgor normal, no rashes or lesions   Lymph nodes:   Cervical normal   Neurologic:   CNII-XII intact            Lab, Imaging and other studies: I have personally reviewed pertinent labs  CBC:   Lab Results   Component Value Date    WBC 12 96 (H) 07/13/2022    HGB 8 0 (L) 07/13/2022    HCT 25 1 (L) 07/13/2022     (H) 07/13/2022     07/13/2022    MCH 32 7 07/13/2022    MCHC 31 9 07/13/2022    RDW 17 5 (H) 07/13/2022    MPV 10 0 07/13/2022    NRBC 0 07/13/2022     CMP:   Lab Results   Component Value Date    K 3 7 07/13/2022    CL 97 07/13/2022    CO2 27 07/13/2022    BUN 40 (H) 07/13/2022    CREATININE 3 13 (H) 07/13/2022    CALCIUM 8 5 07/13/2022    EGFR 19 07/13/2022         Results from last 7 days   Lab Units 07/13/22  0444 07/12/22  0459 07/11/22  0549 07/08/22  0447 07/07/22  1300   POTASSIUM mmol/L 3 7 3 9 3 7   < > 4 8   CHLORIDE mmol/L 97 95* 95*   < > 90*   CO2 mmol/L 27 28 26   < > 28   BUN mg/dL 40* 34* 62*   < > 37*   CREATININE mg/dL 3 13* 2 77* 3 85*   < > 3 53*   CALCIUM mg/dL 8 5 8 1* 8 3*   < > 8 9   ALK PHOS U/L  --   --   --   --  93   ALT U/L  --   --   --   --  10   AST U/L  --   --   --   --  9*    < > = values in this interval not displayed           Phosphorus:   Lab Results   Component Value Date    PHOS 2 7 07/13/2022     Magnesium:   Lab Results   Component Value Date    MG 1 9 07/13/2022     Urinalysis: No results found for: Yusra Garcia, SPECGRAV, [address] [facility], LEUKOCYTESUR, NITRITE, [facility], [place], [address] [facility], BLOODU  Ionized Calcium: No results found for: CAION  Coagulation: No results found for: PT, INR, APTT  Troponin: No results found for: TROPONINI  ABG: No results found for: PHART, NGI8GPA, PO2ART, HKY3QYS, P2XGIIZF, BEART, SOURCE  Radiology review:     IMAGING  Procedure: NM ceretec abscess localization limited    Result Date: 7/13/2022  Narrative: CERETEC WHITE BLOOD CELL STUDY INDICATION:Left foot ceretec bone scan  Suspect 1st met and 2nd metatarsal osteomyelitis with underling abscess  COMPARISON:   Left foot radiographs 7/7/2022 and priors TECHNIQUE: The study was performed following the intravenous administration of 27 9 mCi Tc-99m labeled Ceretec white blood cells  Static images of the feet were acquired in multiple projections  Imaging was obtained 3 hours and 22 hours post injection  FINDINGS: There is persistent radiotracer activity on the 3 hour and 24-hour images in the distal left foot  Activity is amorphous, but appears to be centered in the region of the left 2nd proximal phalanx amputation site and surrounding soft tissue  Findings correspond with the history of cellulitis /abscess  Osseous involvement is difficult to exclude due to the amorphous appearance of the radiotracer, even on the delayed 22 hour images  Impression: 1  Persistent radiotracer activity centered in the region of the left 2nd proximal phalanx amputation site and surrounding soft tissues, corresponding with the history of cellulitis/abscess  Osseous involvement is difficult to exclude due to the amorphous appearance of the radiotracer  Workstation performed: [id number]     Procedure: IR port Removal    Result Date: 7/12/2022  Narrative: Right chest port removal Clinical History: Colon cancer  Multiple active medical issues including end-stage renal disease on dialysis, heart disease with pacemaker  Foot infection    Now with Staphylococcus aureus bacteremia Multidisciplinary discussion regarding intravascular device management, with plan at this time to remove the port which will no longer be used  Fluoro time: 0 5 minutes Conscious sedation time: 30 minutes Technique: The patient was brought to the interventional radiology suite and identified verbally and by wristband  Risks benefits alternatives were discussed  The patient was placed supine on the table, and the existing port site was prepped and draped in the usual sterile fashion  All elements of maximal sterile barrier technique were followed (cap, mask, sterile gown, sterile gloves, large sterile sheet, hand hygiene, and 2% chlorhexidine for cutaneous antisepsis)  Timeout was performed  A preprocedure fluoroscopic image was obtained  Lidocaine was administered to the skin of the old incision site and port pocket  The incision was opened, and the port hub was dissected free  Once control of the hub was obtained, the port was removed in its entirety under live fluoroscopic guidance   The tip was sent for culture  Hemostasis was achieved  Removal was confirmed fluoroscopically  The incision was closed using deep and interrupted Vicryl sutures  Medical glue was then applied to the incision site  A sterile dressing was applied  The patient tolerated the procedure well and there were no immediate complications  Findings: Preprocedure imaging with right tunneled dialysis catheter, right chest port, pacemaker  Final imaging with poor removed and unchanged position of tunneled catheter     Impression: Fluoroscopically guided removal of right chest port The tunnel dialysis catheter will be managed based on upcoming ELBERT, blood cultures  It may be appropriate to consider over-the-wire exchange in this patient with limited access options   Workstation performed: [id number]       Current Facility-Administered Medications   Medication Dose Route Frequency    acetaminophen (TYLENOL) tablet 650 mg  650 mg Oral Q6H PRN    amLODIPine (NORVASC) tablet 10 mg  10 mg Oral Daily    apixaban (ELIQUIS) tablet 5 mg  5 mg Oral BID    ceFAZolin (ANCEF) IVPB (premix in dextrose) 1,000 mg 50 mL  1,000 mg Intravenous Q24H    insulin glargine (LANTUS) subcutaneous injection 10 Units 0 1 mL  10 Units Subcutaneous HS    insulin lispro (HumaLOG) 100 units/mL subcutaneous injection 1-6 Units  1-6 Units Subcutaneous TID AC    loperamide (IMODIUM) capsule 2 mg  2 mg Oral Q4H PRN    midodrine (PROAMATINE) tablet 5 mg  5 mg Oral Once per day on Mon Wed Fri    sevelamer (RENAGEL) tablet 800 mg  800 mg Oral TID With Meals    tamsulosin (FLOMAX) capsule 0 4 mg  0 4 mg Oral Daily With Dinner    torsemide [facility]) tablet 80 mg  80 mg Oral Once per day on Sun Tue Thu Sat     Medications Discontinued During This Encounter   Medication Reason    vancomycin (VANCOCIN) 1,250 mg in sodium chloride 0 9 % 250 mL IVPB     allopurinol (ZYLOPRIM) tablet 300 mg     cefepime (MAXIPIME) IVPB (premix in dextrose) 1,000 mg 50 mL     sodium bicarbonate tablet 650 mg     vancomycin (VANCOCIN) IVPB (premix in dextrose) 1,000 mg 200 mL     apixaban (ELIQUIS) tablet 5 mg     metoprolol tartrate (LOPRESSOR) tablet 50 mg     vancomycin (VANCOCIN) IVPB (premix in dextrose) 1,000 mg 200 mL     vancomycin (VANCOCIN) 1500 mg in sodium chloride 0 9% 250 mL IVPB        Skyler Degroot MD      This progress note was produced in part using a dictation device which may document imprecise wording from author's original intent

## 2022-07-13 NOTE — PROGRESS NOTES
[address]  Progress Note Edilberto Han 1955, 74 y o  male MRN: [id number]  Unit/Bed#: ICU 11-01 Encounter: [id number]  Primary Care Provider: Courtney Mcintosh DO   Date and time admitted to hospital: 7/7/2022 12:44 PM    * Diabetic infection of left foot [facility])  Assessment & Plan  · Status post incision and drainage at the bedside with Podiatry  · Continue cefazolin as per ID  · Pharmacy consult for vancomycin trough management  · Podiatry consulted ; appreciate recommendations  · Follow-up bone scan  · Will likely need TMA as wound is probed to bone with likely underlying osteomyelitis  · Infectious Diseases following    Acute respiratory failure with hypoxia ([address] )  Assessment & Plan  · Requiring 2 L of nasal cannula supplemental O2 this morning  · Possibly secondary to volume overload  · Wean O2 as tolerated  · Nephrology following ; appreciate recommendations regarding possible diuresis  · Will remove volume during HD treatments  · Goal SpO2 > 90%    Severe sepsis with lactic acidosis ([address] )  Assessment & Plan  · Resolved with IV fluid resuscitation and broad-spectrum IV antibiotics  · Secondary to diabetic foot infection present on admission with fever and leukocytosis  · Lactic acidosis resolved  · Will follow-up bone scan of left foot  · Continue IV antibiotics with cefazolin  · Infectious Diseases following  · Trend procalcitonin  · Blood cultures positive for gram-positive cocci ; repeat blood cultures with no growth    Gram-positive bacteremia  Assessment & Plan  · Source likely left diabetic toe ulcer  · Likely MSSA  · Repeat blood cultures with no growth to date  · Continue cefazolin as per ID  · Follow-up repeat blood cultures ; no growth at 48 hours  · No need for ELBERT at this time as per ID ; will need extended course of IV antibiotics  · IR consult for chemo port removal and possible fistula removal    Type 2 diabetes mellitus with chronic kidney disease, with long-term current use of insulin (HCC)  Assessment & Plan  · Will monitor fingersticks  · Insulin sliding scale  · Lantus 10 units at bedtime  · Diabetic diet  · Will adjust diabetic regimen as needed    Persistent atrial fibrillation ([address] )  Assessment & Plan  · Will continue Lopressor  · Continue home Eliquis  · Will discontinue 48 hours prior to surgery    ESRD (end stage renal disease) [facility])  Assessment & Plan  Lab Results   Component Value Date    EGFR 22 2022    EGFR 15 2022    EGFR 14 07/10/2022    CREATININE 2 77 (H) 2022    CREATININE 3 85 (H) 2022    CREATININE 4 06 (H) 07/10/2022   · ESRD on HD Monday/Wednesday/Friday  · Nephrology following ; appreciate recommendations  · 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, sevelamer    Colon cancer [facility])  Assessment & Plan  · Patient follows with Hematology/Oncology  · Last chemo session was on 2022  · Aleyda Charles will be removed in the setting of Gram-positive bacteremia    VTE Pharmacologic Prophylaxis: Eliquis    Patient Centered Rounds: Patient care rounds were performed with nursing    Discussions with Specialists or Other Care Team Provider:  Case Management, Nursing, PT/OT    Education and Discussions with Family / Patient: Spoke with patient's wife via telephone    Time Spent for Care: 30  More than 50% of total time spent on counseling and coordination of care as described above  Current Length of Stay: 6 day(s)    Current Patient Status: Inpatient     Certification Statement: The patient will continue to require additional inpatient hospital stay due to bone scan and likely TMA with Podiatry    Discharge Plan: Pending PT/OT evaluation and treatment    Code Status: Level 1 - Full Code      Subjective:   Patient seen and examined at bedside  No acute events overnight  Denies chest pain, SOB, diaphoresis, nausea/vomiting/diarrhea, fevers/chills       Objective:     Vitals:   Temp (24hrs), Av 8 °F (36 6 °C), Min:97 3 °F (36 3 °C), Max:98 8 °F (37 1 °C)    Temp:  [97 3 °F (36 3 °C)-98 8 °F (37 1 °C)] 98 8 °F (37 1 °C)  HR:  [60-64] 61  Resp:  [18-20] 18  BP: (128-173)/(58-74) 143/63  SpO2:  [88 %-100 %] 90 %  Body mass index is 29 42 kg/m²  Input and Output Summary (last 24 hours): Intake/Output Summary (Last 24 hours) at 7/13/2022 1230  Last data filed at 7/13/2022 0820  Gross per 24 hour   Intake 540 ml   Output 0 ml   Net 540 ml       Physical Exam:     Physical Exam  Vitals and nursing note reviewed  Constitutional:       Appearance: He is well-developed  HENT:      Head: Normocephalic and atraumatic  Eyes:      Conjunctiva/sclera: Conjunctivae normal    Cardiovascular:      Rate and Rhythm: Normal rate and regular rhythm  Heart sounds: No murmur heard  Pulmonary:      Effort: Pulmonary effort is normal  No respiratory distress  Breath sounds: Normal breath sounds  Abdominal:      Palpations: Abdomen is soft  Tenderness: There is no abdominal tenderness  Musculoskeletal:      Cervical back: Neck supple  Comments: Left diabetic toe ulcer   Skin:     General: Skin is warm and dry  Neurological:      Mental Status: He is alert  Additional Data:     Labs: I have reviewed pertinent results     Results from last 7 days   Lab Units 07/13/22 0444 07/09/22 0542 07/08/22 0447   WBC Thousand/uL 12 96*   < > 16 91*   HEMOGLOBIN g/dL 8 0*   < > 8 2*   HEMATOCRIT % 25 1*   < > 25 4*   PLATELETS Thousands/uL 213   < > 172   BANDS PCT %  --   --  3   NEUTROS PCT % 79*   < >  --    LYMPHS PCT % 8*   < >  --    LYMPHO PCT %  --   --  2*   MONOS PCT % 8   < >  --    MONO PCT %  --   --  5   EOS PCT % 3   < > 0    < > = values in this interval not displayed       Results from last 7 days   Lab Units 07/13/22 0444 07/09/22 0542 07/08/22 0447 07/07/22  1300   SODIUM mmol/L 133*   < > 129* 130*   POTASSIUM mmol/L 3 7   < > 4 3 4 8   CHLORIDE mmol/L 97   < > 95* 90*   CO2 mmol/L 27   < > 25 28   BUN mg/dL 40*   < > 45* 37* CREATININE mg/dL 3 13*   < > 4 17* 3 53*   ANION GAP mmol/L 9   < > 9 12   CALCIUM mg/dL 8 5   < > 8 4 8 9   ALBUMIN g/dL  --   --  3 0* 3 5   TOTAL BILIRUBIN mg/dL  --   --   --  1 10*   ALK PHOS U/L  --   --   --  93   ALT U/L  --   --   --  10   AST U/L  --   --   --  9*   GLUCOSE RANDOM mg/dL 111   < > 127 182*    < > = values in this interval not displayed  Results from last 7 days   Lab Units 07/12/22  0459   INR  2 13*     Results from last 7 days   Lab Units 07/13/22  1108 07/13/22  0701 07/12/22  2115 07/12/22  1610 07/12/22  1050 07/12/22  0721 07/11/22  2203 07/11/22  1557 07/11/22  1128 07/11/22  0802 07/10/22  2132 07/10/22  1539   POC GLUCOSE mg/dl 283* 116 237* 166* 166* 143* 292* 231* 174* 157* 212* 268*         Results from last 7 days   Lab Units 07/08/22  0447 07/07/22  1646 07/07/22  1300   LACTIC ACID mmol/L  --  1 9 3 2*   PROCALCITONIN ng/ml 1 72*  --  0 92*         Imaging: I have reviewed pertinent imaging       Recent Cultures (last 7 days):     Results from last 7 days   Lab Units 07/09/22  0929 07/09/22  0542 07/07/22  1311 07/07/22  1300   BLOOD CULTURE  No Growth at 72 hrs  No Growth at 72 hrs   Staphylococcus aureus* Staphylococcus aureus*   GRAM STAIN RESULT   --   --  Gram positive cocci in pairs, chains and clusters* Gram positive cocci in pairs, chains and clusters*       Last 24 Hours Medication List:   Current Facility-Administered Medications   Medication Dose Route Frequency Provider Last Rate    acetaminophen  650 mg Oral Q6H PRN Neli Martinez PA-C      amLODIPine  10 mg Oral Daily Cindy Molina MD      apixaban  5 mg Oral BID Cindy Molina MD      cefazolin  1,000 mg Intravenous Q24H Tamara Creed, DO 1,000 mg (07/12/22 1712)    insulin glargine  10 Units Subcutaneous HS Cindy Jesse, MD      insulin lispro  1-6 Units Subcutaneous TID [facility] Mikaela Brooks MD      loperamide  2 mg Oral Q4H PRN Tamara Pride DO  midodrine  5 mg Oral Once per day on Mon Wed Fri El Elmore MD      polyethylene glycol  17 g Oral Daily PRN Gifty Winkler DO      senna-docusate sodium  1 tablet Oral HS Gifty Winkler DO      sevelamer  800 mg Oral TID With Meals El Elmore MD      tamsulosin  0 4 mg Oral Daily With Rambo Mendez MD      torsemide  80 mg Oral Once per day on Sun Tue Thu Sat El Elmore MD          Today, Patient Was Seen By: Gifty Winkler DO    ** Please Note: Dictation voice to text software may have been used in the creation of this document   **

## 2022-07-13 NOTE — PLAN OF CARE
Target UF goal 3 L as tolerated, Patient dialyzing for 4 hours on 4 K bath for serum K of 3 7 per protocol  Post-Dialysis RN Treatment Note    Blood Pressure:  Pre 151/67 mm/Hg  Post 146/65 mmHg   EDW  89 5 kg    Weight:  Pre 92 3 kg   Post 89 6 kg   Mode of weight measurement: Bed Scale   Volume Removed  3000 ml    Treatment duration 4 hours    NS given  No    Treatment shortened? No   Medications given during Rx Cefazolin 1000mg   Estimated Kt/V  1 46   Access type: Permacath/TDC   Access Issues: Yes, describe: Catheter connected reversed initally, no blood return from arterial line  [place] through treatment, arterial line alarming BFR lowered and cvc flusshed and patency checked connected bloodlines regular       Report called to primary nurse   Yes, Antelmo Plasencia, RN & Mendoza Joe RN      Problem: METABOLIC, FLUID AND ELECTROLYTES - ADULT  Goal: Electrolytes maintained within normal limits  Description: INTERVENTIONS:  - Monitor labs and assess patient for signs and symptoms of electrolyte imbalances  - Administer electrolyte replacement as ordered  - Monitor response to electrolyte replacements, including repeat lab results as appropriate  - Instruct patient on fluid and nutrition as appropriate  Outcome: Progressing  Goal: Fluid balance maintained  Description: INTERVENTIONS:  - Monitor labs   - Monitor I/O and WT  - Instruct patient on fluid and nutrition as appropriate  - Assess for signs & symptoms of volume excess or deficit  Outcome: Progressing

## 2022-07-14 ENCOUNTER — ANESTHESIA EVENT (INPATIENT)
Dept: PERIOP | Facility: HOSPITAL | Age: 67
DRG: 853 | End: 2022-07-14
Payer: MEDICARE

## 2022-07-14 LAB
ANION GAP SERPL CALCULATED.3IONS-SCNC: 7 MMOL/L (ref 4–13)
BACTERIA BLD CULT: NORMAL
BACTERIA BLD CULT: NORMAL
BASOPHILS # BLD AUTO: 0.03 THOUSANDS/ΜL (ref 0–0.1)
BASOPHILS NFR BLD AUTO: 0 % (ref 0–1)
BUN SERPL-MCNC: 21 MG/DL (ref 5–25)
CALCIUM SERPL-MCNC: 8.1 MG/DL (ref 8.4–10.2)
CHLORIDE SERPL-SCNC: 96 MMOL/L (ref 96–108)
CO2 SERPL-SCNC: 28 MMOL/L (ref 21–32)
CREAT SERPL-MCNC: 2.34 MG/DL (ref 0.6–1.3)
EOSINOPHIL # BLD AUTO: 0.36 THOUSAND/ΜL (ref 0–0.61)
EOSINOPHIL NFR BLD AUTO: 3 % (ref 0–6)
ERYTHROCYTE [DISTWIDTH] IN BLOOD BY AUTOMATED COUNT: 17.5 % (ref 11.6–15.1)
GFR SERPL CREATININE-BSD FRML MDRD: 27 ML/MIN/1.73SQ M
GLUCOSE SERPL-MCNC: 107 MG/DL (ref 65–140)
GLUCOSE SERPL-MCNC: 124 MG/DL (ref 65–140)
GLUCOSE SERPL-MCNC: 213 MG/DL (ref 65–140)
GLUCOSE SERPL-MCNC: 222 MG/DL (ref 65–140)
GLUCOSE SERPL-MCNC: 276 MG/DL (ref 65–140)
HCT VFR BLD AUTO: 25.9 % (ref 36.5–49.3)
HGB BLD-MCNC: 8 G/DL (ref 12–17)
IMM GRANULOCYTES # BLD AUTO: 0.3 THOUSAND/UL (ref 0–0.2)
IMM GRANULOCYTES NFR BLD AUTO: 2 % (ref 0–2)
LYMPHOCYTES # BLD AUTO: 0.98 THOUSANDS/ΜL (ref 0.6–4.47)
LYMPHOCYTES NFR BLD AUTO: 8 % (ref 14–44)
MAGNESIUM SERPL-MCNC: 2 MG/DL (ref 1.9–2.7)
MCH RBC QN AUTO: 32.1 PG (ref 26.8–34.3)
MCHC RBC AUTO-ENTMCNC: 30.9 G/DL (ref 31.4–37.4)
MCV RBC AUTO: 104 FL (ref 82–98)
MONOCYTES # BLD AUTO: 1.05 THOUSAND/ΜL (ref 0.17–1.22)
MONOCYTES NFR BLD AUTO: 8 % (ref 4–12)
NEUTROPHILS # BLD AUTO: 9.9 THOUSANDS/ΜL (ref 1.85–7.62)
NEUTS SEG NFR BLD AUTO: 79 % (ref 43–75)
NRBC BLD AUTO-RTO: 0 /100 WBCS
PHOSPHATE SERPL-MCNC: 2.1 MG/DL (ref 2.3–4.1)
PLATELET # BLD AUTO: 240 THOUSANDS/UL (ref 149–390)
PMV BLD AUTO: 10.1 FL (ref 8.9–12.7)
POTASSIUM SERPL-SCNC: 4.3 MMOL/L (ref 3.5–5.3)
RBC # BLD AUTO: 2.49 MILLION/UL (ref 3.88–5.62)
SODIUM SERPL-SCNC: 131 MMOL/L (ref 135–147)
WBC # BLD AUTO: 12.62 THOUSAND/UL (ref 4.31–10.16)

## 2022-07-14 PROCEDURE — 99024 POSTOP FOLLOW-UP VISIT: CPT | Performed by: STUDENT IN AN ORGANIZED HEALTH CARE EDUCATION/TRAINING PROGRAM

## 2022-07-14 PROCEDURE — 97530 THERAPEUTIC ACTIVITIES: CPT

## 2022-07-14 PROCEDURE — 85025 COMPLETE CBC W/AUTO DIFF WBC: CPT | Performed by: INTERNAL MEDICINE

## 2022-07-14 PROCEDURE — 80048 BASIC METABOLIC PNL TOTAL CA: CPT | Performed by: INTERNAL MEDICINE

## 2022-07-14 PROCEDURE — 84100 ASSAY OF PHOSPHORUS: CPT | Performed by: INTERNAL MEDICINE

## 2022-07-14 PROCEDURE — 97110 THERAPEUTIC EXERCISES: CPT

## 2022-07-14 PROCEDURE — 99233 SBSQ HOSP IP/OBS HIGH 50: CPT | Performed by: INTERNAL MEDICINE

## 2022-07-14 PROCEDURE — 99232 SBSQ HOSP IP/OBS MODERATE 35: CPT | Performed by: INTERNAL MEDICINE

## 2022-07-14 PROCEDURE — 97116 GAIT TRAINING THERAPY: CPT

## 2022-07-14 PROCEDURE — 82948 REAGENT STRIP/BLOOD GLUCOSE: CPT

## 2022-07-14 PROCEDURE — 83735 ASSAY OF MAGNESIUM: CPT | Performed by: INTERNAL MEDICINE

## 2022-07-14 RX ADMIN — INSULIN LISPRO 2 UNITS: 100 INJECTION, SOLUTION INTRAVENOUS; SUBCUTANEOUS at 16:16

## 2022-07-14 RX ADMIN — SEVELAMER HYDROCHLORIDE 800 MG: 800 TABLET, FILM COATED PARENTERAL at 07:42

## 2022-07-14 RX ADMIN — AMLODIPINE BESYLATE 10 MG: 10 TABLET ORAL at 21:40

## 2022-07-14 RX ADMIN — APIXABAN 5 MG: 5 TABLET, FILM COATED ORAL at 17:53

## 2022-07-14 RX ADMIN — TORSEMIDE 80 MG: 20 TABLET ORAL at 09:33

## 2022-07-14 RX ADMIN — INSULIN LISPRO 4 UNITS: 100 INJECTION, SOLUTION INTRAVENOUS; SUBCUTANEOUS at 12:22

## 2022-07-14 RX ADMIN — TAMSULOSIN HYDROCHLORIDE 0.4 MG: 0.4 CAPSULE ORAL at 16:16

## 2022-07-14 RX ADMIN — APIXABAN 5 MG: 5 TABLET, FILM COATED ORAL at 09:33

## 2022-07-14 RX ADMIN — CEFAZOLIN SODIUM 1000 MG: 1 SOLUTION INTRAVENOUS at 15:53

## 2022-07-14 RX ADMIN — INSULIN GLARGINE 10 UNITS: 100 INJECTION, SOLUTION SUBCUTANEOUS at 21:40

## 2022-07-14 RX ADMIN — INSULIN LISPRO 2 UNITS: 100 INJECTION, SOLUTION INTRAVENOUS; SUBCUTANEOUS at 21:40

## 2022-07-14 RX ADMIN — SEVELAMER HYDROCHLORIDE 800 MG: 800 TABLET, FILM COATED PARENTERAL at 12:22

## 2022-07-14 NOTE — PROGRESS NOTES
[address]  Progress Note Brandon Rodrigues 1955, 74 y o  male MRN: [id number]  Unit/Bed#: ICU 11-01 Encounter: [id number]  Primary Care Provider: Alicja Chacko DO   Date and time admitted to hospital: 7/7/2022 12:44 PM    * Diabetic infection of left foot [facility])  Assessment & Plan  · Status post incision and drainage at the bedside with Podiatry  · Continue cefazolin as per ID  · Pharmacy consult for vancomycin trough management  · Podiatry consulted ; appreciate recommendations  · Follow-up bone scan  · Will likely need TMA as wound is probed to bone with likely underlying osteomyelitis  · Infectious Diseases following    Acute respiratory failure with hypoxia (HCC)  Assessment & Plan  · Requiring 2 L of nasal cannula supplemental O2 this morning  · Possibly secondary to volume overload  · Wean O2 as tolerated  · Nephrology following ; appreciate recommendations regarding possible diuresis  · Will remove volume during HD treatments  · Goal SpO2 > 90%    Severe sepsis with lactic acidosis ([address] )  Assessment & Plan  · Resolved with IV fluid resuscitation and broad-spectrum IV antibiotics  · Secondary to diabetic foot infection present on admission with fever and leukocytosis  · Lactic acidosis resolved  · Will follow-up bone scan of left foot  · Continue IV antibiotics with cefazolin  · Infectious Diseases following  · Trend procalcitonin  · Blood cultures positive for gram-positive cocci ; repeat blood cultures with no growth    Gram-positive bacteremia  Assessment & Plan  · Source likely left diabetic toe ulcer  · Patient has cleared bacteremia  · TTE with no signs of agitation  · After multi disciplinary discussion it was decided that patient is too high risk for ppm removal and will instead undergo 6 weeks of IV antibiotics in the setting of Gram-positive bacteremia with hardware in place  · Likely MSSA  · Repeat blood cultures with no growth to date  · Continue cefazolin as per ID  · Follow-up repeat blood cultures ; no growth at 48 hours  · No need for ELBERT or ppm removal at this time as per ID ; will need extended course of IV antibiotics  · Chemo port has been removed by IR as patient will no longer be undergoing chemotherapy    ESRD (end stage renal disease) [facility])  Assessment & Plan  Lab Results   Component Value Date    EGFR 22 07/12/2022    EGFR 15 07/11/2022    EGFR 14 07/10/2022    CREATININE 2 77 (H) 07/12/2022    CREATININE 3 85 (H) 07/11/2022    CREATININE 4 06 (H) 07/10/2022   · ESRD on HD Monday/Wednesday/Friday  · Nephrology following ; appreciate recommendations  · 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, sevelamer    Colon cancer [facility])  Assessment & Plan  · Patient follows with Hematology/Oncology  · Last chemo session was on 07/05/2022  · Chemo-port will be removed in the setting of Gram-positive bacteremia    Type 2 diabetes mellitus with chronic kidney disease, with long-term current use of insulin (HCC)  Assessment & Plan  · Will monitor fingersticks  · Insulin sliding scale  · Lantus 10 units at bedtime  · Diabetic diet  · Will adjust diabetic regimen as needed    Persistent atrial fibrillation (HCC)  Assessment & Plan  · Will continue Lopressor  · Continue home Eliquis  · Will discontinue 48 hours prior to surgery    VTE Pharmacologic Prophylaxis: Eliquis    Patient Centered Rounds: Patient care rounds were performed with nursing    Discussions with Specialists or Other Care Team Provider:  Case Management, Nursing, PT/OT    Education and Discussions with Family / Patient: Spoke with patient's wife via telephone    Time Spent for Care: 30  More than 50% of total time spent on counseling and coordination of care as described above      Current Length of Stay: 7 day(s)    Current Patient Status: Inpatient     Certification Statement: The patient will continue to require additional inpatient hospital stay due to bone scan and likely TMA with Podiatry    Discharge Plan: Pending PT/OT evaluation and treatment    Code Status: Level 1 - Full Code      Subjective:   Patient seen and examined at bedside  No acute events overnight  Denies chest pain, SOB, diaphoresis, nausea/vomiting/diarrhea, fevers/chills  Objective:     Vitals:   Temp (24hrs), Av 8 °F (37 1 °C), Min:98 3 °F (36 8 °C), Max:99 4 °F (37 4 °C)    Temp:  [98 3 °F (36 8 °C)-99 4 °F (37 4 °C)] 99 2 °F (37 3 °C)  HR:  [60-63] 60  Resp:  [16-18] 16  BP: (121-161)/(58-74) 123/58  SpO2:  [94 %] 94 %  Body mass index is 29 42 kg/m²  Input and Output Summary (last 24 hours): Intake/Output Summary (Last 24 hours) at 2022 0912  Last data filed at 2022 2101  Gross per 24 hour   Intake 740 ml   Output 3502 ml   Net -2762 ml       Physical Exam:     Physical Exam  Vitals and nursing note reviewed  Constitutional:       Appearance: He is well-developed  HENT:      Head: Normocephalic and atraumatic  Eyes:      Conjunctiva/sclera: Conjunctivae normal    Cardiovascular:      Rate and Rhythm: Normal rate and regular rhythm  Heart sounds: No murmur heard  Pulmonary:      Effort: Pulmonary effort is normal  No respiratory distress  Breath sounds: Normal breath sounds  Abdominal:      Palpations: Abdomen is soft  Tenderness: There is no abdominal tenderness  Musculoskeletal:      Cervical back: Neck supple  Comments: Left diabetic toe ulcer   Skin:     General: Skin is warm and dry  Neurological:      Mental Status: He is alert  Additional Data:     Labs:  I have reviewed pertinent results     Results from last 7 days   Lab Units 22  0610 22  0542 22  0447   WBC Thousand/uL 12 62*   < > 16 91*   HEMOGLOBIN g/dL 8 0*   < > 8 2*   HEMATOCRIT % 25 9*   < > 25 4*   PLATELETS Thousands/uL 240   < > 172   BANDS PCT %  --   --  3   NEUTROS PCT % 79*   < >  --    LYMPHS PCT % 8*   < >  --    LYMPHO PCT %  --   --  2*   MONOS PCT % 8   < >  --    MONO PCT %  --   --  5   EOS PCT % 3   < > 0    < > = values in this interval not displayed  Results from last 7 days   Lab Units 07/14/22  0610 07/09/22  0542 07/08/22  0447 07/07/22  1300   SODIUM mmol/L 131*   < > 129* 130*   POTASSIUM mmol/L 4 3   < > 4 3 4 8   CHLORIDE mmol/L 96   < > 95* 90*   CO2 mmol/L 28   < > 25 28   BUN mg/dL 21   < > 45* 37*   CREATININE mg/dL 2 34*   < > 4 17* 3 53*   ANION GAP mmol/L 7   < > 9 12   CALCIUM mg/dL 8 1*   < > 8 4 8 9   ALBUMIN g/dL  --   --  3 0* 3 5   TOTAL BILIRUBIN mg/dL  --   --   --  1 10*   ALK PHOS U/L  --   --   --  93   ALT U/L  --   --   --  10   AST U/L  --   --   --  9*   GLUCOSE RANDOM mg/dL 107   < > 127 182*    < > = values in this interval not displayed  Results from last 7 days   Lab Units 07/12/22  0459   INR  2 13*     Results from last 7 days   Lab Units 07/14/22  0732 07/13/22  2147 07/13/22  1613 07/13/22  1108 07/13/22  0701 07/12/22  2115 07/12/22  1610 07/12/22  1050 07/12/22  0721 07/11/22  2203 07/11/22  1557 07/11/22  1128   POC GLUCOSE mg/dl 124 189* 194* 283* 116 237* 166* 166* 143* 292* 231* 174*         Results from last 7 days   Lab Units 07/08/22  0447 07/07/22  1646 07/07/22  1300   LACTIC ACID mmol/L  --  1 9 3 2*   PROCALCITONIN ng/ml 1 72*  --  0 92*         Imaging: I have reviewed pertinent imaging       Recent Cultures (last 7 days):     Results from last 7 days   Lab Units 07/13/22  1354 07/09/22  0929 07/09/22  0542 07/07/22  1311 07/07/22  1300   BLOOD CULTURE  Received in Microbiology Lab  Culture in Progress  No Growth After 4 Days  No Growth After 4 Days   Staphylococcus aureus* Staphylococcus aureus*   GRAM STAIN RESULT   --   --   --  Gram positive cocci in pairs, chains and clusters* Gram positive cocci in pairs, chains and clusters*       Last 24 Hours Medication List:   Current Facility-Administered Medications   Medication Dose Route Frequency Provider Last Rate    acetaminophen  650 mg Oral Q6H PRN Joelle Mejia PA-C      amLODIPine 10 mg Oral Daily Liana Hale MD      apixaban  5 mg Oral BID Liana Hale MD      cefazolin  1,000 mg Intravenous Q24H Deisy De Los Santos DO Stopped (07/13/22 1910)    insulin glargine  10 Units Subcutaneous HS Liana Hale MD      insulin lispro  1-6 Units Subcutaneous TID [facility] [place] Liana Hale MD      insulin lispro  1-6 Units Subcutaneous HS Joycelyn Dunham PA-C      loperamide  2 mg Oral Q4H PRN Deisy De Los Santos DO      midodrine  5 mg Oral Once per day on Mon Wed Fri Liana Hale MD      polyethylene glycol  17 g Oral Daily PRN Deisy De Los Santos DO      senna-docusate sodium  1 tablet Oral HS Deisy De Los Santos DO      sevelamer  800 mg Oral TID With Meals Liana Hale MD      tamsulosin  0 4 mg Oral Daily With Vikas Taylor MD      torsemide  80 mg Oral Once per day on Sun Tue Thu Sat Liana Hale MD          Today, Patient Was Seen By: Deisy De Los Santos DO    ** Please Note: Dictation voice to text software may have been used in the creation of this document   **

## 2022-07-14 NOTE — PLAN OF CARE
Problem: PHYSICAL THERAPY ADULT  Goal: Performs mobility at highest level of function for planned discharge setting  See evaluation for individualized goals  Description: Treatment/Interventions: Functional transfer training, LE strengthening/ROM, Therapeutic exercise, Endurance training, Patient/family training, Equipment eval/education, Bed mobility, Gait training, Spoke to nursing, Elevations  Equipment Recommended:  (TBD pending progress)       See flowsheet documentation for full assessment, interventions and recommendations  Outcome: Progressing  Note: Prognosis: Good  Problem List: Decreased strength, Decreased endurance, Impaired balance, Decreased mobility, Decreased skin integrity  Assessment: Pt seen for PT treatment session this date with interventions consisting of gait training to reduce the risk of medical complications w/ emphasis on improving pt's ability to ambulate level surfaces x 50 feet x 2 with close S provided by therapist with RW, Therapeutic exercise to increase BLE stability and improve endurance consisting of: AROM 10 reps and 20 reps B LE in sitting and standing with B UE support position and therapeutic activity to reduce fall risk consisting of training: sit<>stand transfers, static standing tolerance for 3 minutes w/ B UE support, vc and tactile cues for static standing posture faciliation, stand pivot transfers towards B direction and breathing conservation technique utilization  Pt agreeable to PT treatment session upon arrival, pt found seated at EOB, A&O x 4  In comparison to previous session, pt with improvements in ambulatory distance,task advancement  Post session: pt returned back to recliner, all needs in reach and RN notified of session findings/recommendations  Continue to recommend home with outpatient rehabilitation at time of d/c in order to maximize pt's functional independence and safety w/ mobility   Pt continues to be functioning below baseline level, and remains limited 2* factors listed above and including weakness, impaired balance, gait deviations,decreased endurance with required supplemental O2  PT will continue to see pt during current hospitalization in order to address the deficits listed above and provide interventions consistent w/ POC in effort to achieve STGs  Barriers to Discharge: None     PT Discharge Recommendation: Home with outpatient rehabilitation (maintained recommendation)    See flowsheet documentation for full assessment

## 2022-07-14 NOTE — PLAN OF CARE
Problem: MOBILITY - ADULT  Goal: Maintain or return to baseline ADL function  Description: INTERVENTIONS:  -  Assess patient's ability to carry out ADLs; assess patient's baseline for ADL function and identify physical deficits which impact ability to perform ADLs (bathing, care of mouth/teeth, toileting, grooming, dressing, etc )  - Assess/evaluate cause of self-care deficits   - Assess range of motion  - Assess patient's mobility; develop plan if impaired  - Assess patient's need for assistive devices and provide as appropriate  - Encourage maximum independence but intervene and supervise when necessary  - Involve family in performance of ADLs  - Assess for home care needs following discharge   - Consider OT consult to assist with ADL evaluation and planning for discharge  - Provide patient education as appropriate  Outcome: Progressing     Problem: PAIN - ADULT  Goal: Verbalizes/displays adequate comfort level or baseline comfort level  Description: Interventions:  - Encourage patient to monitor pain and request assistance  - Assess pain using appropriate pain scale  - Administer analgesics based on type and severity of pain and evaluate response  - Implement non-pharmacological measures as appropriate and evaluate response  - Consider cultural and social influences on pain and pain management  - Notify physician/advanced practitioner if interventions unsuccessful or patient reports new pain  Outcome: Progressing     Problem: INFECTION - ADULT  Goal: Absence or prevention of progression during hospitalization  Description: INTERVENTIONS:  - Assess and monitor for signs and symptoms of infection  - Monitor lab/diagnostic results  - Monitor all insertion sites, i e  indwelling lines, tubes, and drains  - Monitor endotracheal if appropriate and nasal secretions for changes in amount and color  - [place] appropriate cooling/warming therapies per order  - Administer medications as ordered  - Instruct and encourage patient and family to use good hand hygiene technique  - Identify and instruct in appropriate isolation precautions for identified infection/condition  Outcome: Progressing     Problem: INFECTION - ADULT  Goal: Absence of fever/infection during neutropenic period  Description: INTERVENTIONS:  - Monitor WBC    Outcome: Progressing     Problem: SAFETY ADULT  Goal: Maintain or return to baseline ADL function  Description: INTERVENTIONS:  -  Assess patient's ability to carry out ADLs; assess patient's baseline for ADL function and identify physical deficits which impact ability to perform ADLs (bathing, care of mouth/teeth, toileting, grooming, dressing, etc )  - Assess/evaluate cause of self-care deficits   - Assess range of motion  - Assess patient's mobility; develop plan if impaired  - Assess patient's need for assistive devices and provide as appropriate  - Encourage maximum independence but intervene and supervise when necessary  - Involve family in performance of ADLs  - Assess for home care needs following discharge   - Consider OT consult to assist with ADL evaluation and planning for discharge  - Provide patient education as appropriate  Outcome: Progressing     Problem: DISCHARGE PLANNING  Goal: Discharge to home or other facility with appropriate resources  Description: INTERVENTIONS:  - Identify barriers to discharge w/patient and caregiver  - Arrange for needed discharge resources and transportation as appropriate  - Identify discharge learning needs (meds, wound care, etc )  - Arrange for interpretive services to assist at discharge as needed  - Refer to Case Management Department for coordinating discharge planning if the patient needs post-hospital services based on physician/advanced practitioner order or complex needs related to functional status, cognitive ability, or social support system  Outcome: Progressing     Problem: Nutrition/Hydration-ADULT  Goal: Nutrient/Hydration intake appropriate for improving, restoring or maintaining nutritional needs  Description: Monitor and assess patient's nutrition/hydration status for malnutrition  Collaborate with interdisciplinary team and initiate plan and interventions as ordered  Monitor patient's weight and dietary intake as ordered or per policy  Utilize nutrition screening tool and intervene as necessary  Determine patient's food preferences and provide high-protein, high-caloric foods as appropriate       INTERVENTIONS:  - Monitor oral intake, urinary output, labs, and treatment plans  - Assess nutrition and hydration status and recommend course of action  - Evaluate amount of meals eaten  - Assist patient with eating if necessary   - Allow adequate time for meals  - Recommend/ encourage appropriate diets, oral nutritional supplements, and vitamin/mineral supplements  - Order, calculate, and assess calorie counts as needed  - Recommend, monitor, and adjust tube feedings and TPN/PPN based on assessed needs  - Assess need for intravenous fluids  - Provide specific nutrition/hydration education as appropriate  - Include patient/family/caregiver in decisions related to nutrition  Outcome: Progressing     Problem: Prexisting or High Potential for Compromised Skin Integrity  Goal: Skin integrity is maintained or improved  Description: INTERVENTIONS:  - Identify patients at risk for skin breakdown  - Assess and monitor skin integrity  - Assess and monitor nutrition and hydration status  - Monitor labs   - Assess for incontinence   - Turn and reposition patient  - Assist with mobility/ambulation  - Relieve pressure over bony prominences  - Avoid friction and shearing  - Provide appropriate hygiene as needed including keeping skin clean and dry  - Evaluate need for skin moisturizer/barrier cream  - Collaborate with interdisciplinary team   - Patient/family teaching  - Consider wound care consult   Outcome: Progressing     Problem: METABOLIC, FLUID AND ELECTROLYTES - ADULT  Goal: Electrolytes maintained within normal limits  Description: INTERVENTIONS:  - Monitor labs and assess patient for signs and symptoms of electrolyte imbalances  - Administer electrolyte replacement as ordered  - Monitor response to electrolyte replacements, including repeat lab results as appropriate  - Instruct patient on fluid and nutrition as appropriate  Outcome: Progressing     Problem: METABOLIC, FLUID AND ELECTROLYTES - ADULT  Goal: Fluid balance maintained  Description: INTERVENTIONS:  - Monitor labs   - Monitor I/O and WT  - Instruct patient on fluid and nutrition as appropriate  - Assess for signs & symptoms of volume excess or deficit  Outcome: Progressing     Problem: METABOLIC, FLUID AND ELECTROLYTES - ADULT  Goal: Glucose maintained within target range  Description: INTERVENTIONS:  - Monitor Blood Glucose as ordered  - Assess for signs and symptoms of hyperglycemia and hypoglycemia  - Administer ordered medications to maintain glucose within target range  - Assess nutritional intake and initiate nutrition service referral as needed  Outcome: Progressing     Problem: CARDIOVASCULAR - ADULT  Goal: Maintains optimal cardiac output and hemodynamic stability  Description: INTERVENTIONS:  - Monitor I/O, vital signs and rhythm  - Monitor for S/S and trends of decreased cardiac output  - Administer and titrate ordered vasoactive medications to optimize hemodynamic stability  - Assess quality of pulses, skin color and temperature  - Assess for signs of decreased coronary artery perfusion  - Instruct patient to report change in severity of symptoms  Outcome: Progressing     Problem: CARDIOVASCULAR - ADULT  Goal: Absence of cardiac dysrhythmias or at baseline rhythm  Description: INTERVENTIONS:  - Continuous cardiac monitoring, vital signs, obtain 12 lead EKG if ordered  - Administer antiarrhythmic and heart rate control medications as ordered  - Monitor electrolytes and administer replacement therapy as ordered  Outcome: Progressing     Problem: RESPIRATORY - ADULT  Goal: Achieves optimal ventilation and oxygenation  Description: INTERVENTIONS:  - Assess for changes in respiratory status  - Assess for changes in mentation and behavior  - Position to facilitate oxygenation and minimize respiratory effort  - Oxygen administered by appropriate delivery if ordered  - Initiate smoking cessation education as indicated  - Encourage broncho-pulmonary hygiene including cough, deep breathe, Incentive Spirometry  - Assess the need for suctioning and aspirate as needed  - Assess and instruct to report SOB or any respiratory difficulty  - Respiratory Therapy support as indicated  Outcome: Progressing     Problem: GENITOURINARY - ADULT  Goal: Maintains or returns to baseline urinary function  Description: INTERVENTIONS:  - Assess urinary function  - Encourage oral fluids to ensure adequate hydration if ordered  - Administer IV fluids as ordered to ensure adequate hydration  - Administer ordered medications as needed  - Offer frequent toileting  - Follow urinary retention protocol if ordered  Outcome: Progressing     Problem: GENITOURINARY - ADULT  Goal: Absence of urinary retention  Description: INTERVENTIONS:  - Assess patients ability to void and empty bladder  - Monitor I/O  - Bladder scan as needed  - Discuss with physician/AP medications to alleviate retention as needed  - Discuss catheterization for long term situations as appropriate  Outcome: Progressing

## 2022-07-14 NOTE — PHYSICAL THERAPY NOTE
Physical Therapy Treatment Session Note    Patient's Name: Jerry Barron    Admitting Diagnosis  Cellulitis of left foot [D22 704]  Severe sepsis ([place] [address] ) [A41 9, R65 20]  Stage 4 chronic kidney disease ([place] [address] ) [N18 4]    Problem List  Patient Active Problem List   Diagnosis    Bilateral leg edema    Diabetic ulcer of toe of left foot associated with type 2 diabetes mellitus ([facility] )    Easy bruising    Eczema    Erectile dysfunction of non-organic origin    Hyperlipidemia    Uremic acidosis    Arthritis    Peripheral arterial disease (HCC)    PVCs (premature ventricular contractions)    Rhinitis    Systolic murmur    Vertigo    Complete heart block (HCC)    Metabolic acidosis    ELZBIETA (acute kidney injury) ([place] [address] )    Other hyperlipidemia    Persistent atrial fibrillation (HCC)    Persistent proteinuria    Volume overload    Urinary retention    NICOLASA (obstructive sleep apnea)    Type 2 diabetes mellitus with chronic kidney disease, with long-term current use of insulin (HCC)    Hypertension    Stage 4 chronic kidney disease (HCC)    Aortic valve stenosis, nonrheumatic    Anemia    Fever    Mitral stenosis    Abnormal CT of the chest    Hyperkalemia    Acute pulmonary edema (HCC)    Chronic diastolic (congestive) heart failure (HCC)    Left renal artery stenosis (HCC)    S/P amputation of lesser toe, left (HCC)    S/P amputation of lesser toe, right (HCC)    Elevated troponin I level    Staphylococcus aureus bacteremia    Type 2 diabetes mellitus with diabetic neuropathy, without long-term current use of insulin (HCC)    Hyponatremia    Iron deficiency anemia    Anxiety    Osteomyelitis of left foot (HCC)    Amputation of left great toe (HCC)    Multiple drug resistant organism (MDRO) culture positive    Renovascular hypertension    SSS (sick sinus syndrome) (HCC)    Chronic obstructive pulmonary disease (HCC)    Absence of toe (HCC)    Chronic painful diabetic neuropathy ([facility] )    Onychomycosis    Colon cancer ([place] [address] )    Acute kidney injury superimposed on chronic kidney disease (HCC)    RSV (respiratory syncytial virus pneumonia)    Chemotherapy-induced neutropenia (HCC)    Chemotherapy-induced nausea    Acute on chronic diastolic congestive heart failure (HCC)    Other iron deficiency anemias    Pulmonary hypertension (HCC)    S/P arteriovenous (AV) fistula creation    Severe sepsis with lactic acidosis (HCC)    Diabetic infection of left foot (HCC)    Gram-positive bacteremia    Acute respiratory failure with hypoxia (HCC)    ESRD (end stage renal disease) ([facility] )       Past Medical History  Past Medical History:   Diagnosis Date    Anemia     iron def    Arthritis     OA    Bruise of both arms     forearms and both hands    Bruises easily     Cancer ([facility] ) 09/01/2021    colon    CHF (congestive heart failure) (HCC)     Chronic kidney disease     CPAP (continuous positive airway pressure) dependence     Diabetes mellitus ([facility] )     Diabetic foot ulcer (HCC)     Eczema     Erectile dysfunction     Gout     Heart murmur     History of permanent cardiac pacemaker placement 11/2018    History of transfusion     Hyperlipidemia     Hypertension     Hypoglycemia 03/08/2019    Murmur     Nephropathy     Osteoarthritis     Pacemaker 11/06/2018    PAD (peripheral artery disease) ([facility])     Seasonal allergies     Sleep apnea     Toe infection     bilat great toes    Vertigo     Walks frequently     Wears dentures     upper    Wears glasses        Past Surgical History  Past Surgical History:   Procedure Laterality Date    CARDIAC PACEMAKER PLACEMENT      CARPAL TUNNEL RELEASE Left     CARPAL TUNNEL RELEASE Right     CARPAL TUNNEL RELEASE      COLONOSCOPY  08/2020    COLONOSCOPY      FL GUIDED CENTRAL VENOUS ACCESS DEVICE INSERTION  01/11/2022    FOOT AMPUTATION Left 02/10/2020    Procedure: PARTIAL 1ST RAY AMPUTATION;  Surgeon: Prieto Watson DPM;  [facility];  Service: Podiatry    INCISION AND DRAINAGE OF WOUND Left 01/12/2020    Procedure: INCISION AND DRAINAGE (I&D) EXTREMITY AND REMOVAL OF SESMOID BONE;  Surgeon: Rosina Arevalo DPM;  [facility];  Service: Podiatry    IR OTHER  01/21/2022    IR PICC REPOSITION  01/14/2020    IR PORT REMOVAL  7/12/2022    IR TUNNELED DIALYSIS CATHETER PLACEMENT  04/26/2022    KNEE ARTHROSCOPY Left     KNEE ARTHROSCOPY Right     KNEE SURGERY      AR AMPUTATION TOE,I-P JT Bilateral 05/02/2017    Procedure: PARTIAL AMPUTATION RIGHT AND LEFT HALLUX ;  Surgeon: mAira Ramirez DPM;  [facility];  Service: Podiatry    AR AMPUTATION TOE,MT-P JT Left 07/25/2017    Procedure: 2ND TOE AMPUTATION;  Surgeon: Amira Ramirez DPM;  [facility];  Service: Podiatry    AR ANASTOMOSIS,AV,ANY SITE Left 6/1/2022    Procedure: CREATION FISTULA ARTERIOVENOUS (AV) RADIOCEPHALIC;  Surgeon: Irving Sidhu MD;  [facility];  Service: Vascular    AR INSJ TUNNELED CTR VAD W/SUBQ PORT AGE 5 YR/> N/A 01/11/2022    Procedure: INSERTION VENOUS PORT ( PORT-A-CATH) IR;  Surgeon: Mini Lawson DO;  [facility];  Service: Interventional Radiology    RESECTION LOW ANTERIOR LAPAROSCOPIC N/A 10/21/2021    Procedure: RESECTION LOW ANTERIOR LAPAROSCOPIC;  Surgeon: Richar Henson MD;  [facility]: [facility];  Service: Colorectal    SHOULDER ARTHROSCOPY Left     with screws,RTC    SHOULDER SURGERY Left     rotator cuff    TOE AMPUTATION Left 01/08/2020    Procedure: HALLUX AMPUTATION;  Surgeon: Rosina Arevalo DPM;  [facility];  Service: Podiatry    UPPER GASTROINTESTINAL ENDOSCOPY  03/2020    Intestinal metaplasia prepyloric    VASECTOMY          07/14/22 0753   PT Last Visit   PT Visit Date 07/14/22   Note Type   Note Type Treatment   Pain Assessment   Pain Assessment Tool 0-10   Pain Score No Pain  (denies)   Restrictions/Precautions   Weight Bearing Precautions Per Order Yes   LLE Weight Bearing Per Order (S)  WBAT   Braces or Orthoses Other (Comment)  ([place] shoe L)   Other Precautions WBS; Fall Risk;O2;Multiple lines;Telemetry;Hard of hearing  (2 L NC-remained on supplemental O2 throughout session,decreased skin integrity L foot)   General   Chart Reviewed Yes   Response to Previous Treatment Patient with no complaints from previous session  Family/Caregiver Present No   Cognition   Overall Cognitive Status WFL   Arousal/Participation Alert; Responsive; Cooperative   Attention Within functional limits   Orientation Level Oriented X4   Memory Within functional limits   Following Commands Follows all commands and directions without difficulty   Comments Pt  agreeable to PT tx session, pleasant  Subjective   Subjective 'I don't know what I have today"   Bed Mobility   Additional Comments DNT pt  was sitting on bedside upon arrival/out of bed on recliner upon conclusion  Transfers   Sit to Stand 5  Supervision   Additional items Assist x 1;Bedrails;Verbal cues   Stand to Sit 5  Supervision   Additional items Assist x 1;Bedrails;Verbal cues   Stand pivot 5  Supervision   Additional items Assist x 1;Verbal cues   Additional Comments Verbal cues for appropriate hand placement,safety w/directional changes  Ambulation/Elevation   Gait pattern Forward Flexion; Short stride   Gait Assistance 5  Supervision   Additional items Assist x 1;Verbal cues   Assistive Device Rolling walker   Distance 50 feet x 2  (directional changes x 4)   Ambulation/Elevation Additional Comments Verbal cues for proper body mechanics, safety awareness with directional changes  Balance   Static Sitting Normal   Dynamic Sitting Good   Static Standing Fair +   Dynamic Standing Fair +   Ambulatory Fair   Endurance Deficit   Endurance Deficit Yes   Endurance Deficit Description RPE scale utilized-rated 5/10 upon ambulatory conclusion  Pt  demonstrated HOROWITZ with ambulatory progression  Symptomatic resolution with increased time,breathing conservation technique utilization     Activity Tolerance   Activity Tolerance Patient limited by fatigue Nurse Made Aware yes, Nichole RN   Exercises   Quad Sets Sitting;20 reps;AROM; Bilateral   Glute Sets Sitting;20 reps;AROM; Bilateral   Hip Flexion Sitting;20 reps;AROM; Bilateral   Hip Abduction Sitting;20 reps;AROM; Bilateral   Hip Adduction Sitting;20 reps;AROM; Bilateral   Ankle Pumps Sitting;20 reps;AROM; Bilateral  (HEP provided for all TE with handouts)   Balance training  Standing tasks: ML weightshifts x 20 B, ANT taps x 10 B, outside base of support LAT taps x 10 B  Assessment   Prognosis Good   Problem List Decreased strength;Decreased endurance; Impaired balance;Decreased mobility; Decreased skin integrity   Assessment Pt seen for PT treatment session this date with interventions consisting of gait training to reduce the risk of medical complications w/ emphasis on improving pt's ability to ambulate level surfaces x 50 feet x 2 with close S provided by therapist with RW, Therapeutic exercise to increase BLE stability and improve endurance consisting of: AROM 10 reps and 20 reps B LE in sitting and standing with B UE support position and therapeutic activity to reduce fall risk consisting of training: sit<>stand transfers, static standing tolerance for 3 minutes w/ B UE support, vc and tactile cues for static standing posture faciliation, stand pivot transfers towards B direction and breathing conservation technique utilization  Pt agreeable to PT treatment session upon arrival, pt found seated at EOB, A&O x 4  In comparison to previous session, pt with improvements in ambulatory distance,task advancement  Post session: pt returned back to recliner, all needs in reach and RN notified of session findings/recommendations  Continue to recommend home with outpatient rehabilitation at time of d/c in order to maximize pt's functional independence and safety w/ mobility   Pt continues to be functioning below baseline level, and remains limited 2* factors listed above and including weakness, impaired balance, gait deviations,decreased endurance with required supplemental O2  PT will continue to see pt during current hospitalization in order to address the deficits listed above and provide interventions consistent w/ POC in effort to achieve STGs  Goals   Patient Goals to feel better soon   STG Expiration Date 07/19/22   Short Term Goal #1 STGs remain appropriate   PT Treatment Day 1   Plan   Treatment/Interventions Functional transfer training;LE strengthening/ROM; Elevations; Therapeutic exercise; Endurance training;Patient/family training;Equipment eval/education;Gait training;Spoke to nursing   Progress Progressing toward goals   PT Frequency 2-3x/wk   Recommendation   PT Discharge Recommendation Home with outpatient rehabilitation  (maintained recommendation)   Equipment Recommended 9 [facility] Recommended Wheeled walker   Change/add to [place]? No   Additional Comments Upon conclusion, pt  was resting out of bed on recliner  All needs within reach  Additional Comments 2 Pt's raw score on the AM-[facility] Basic Mobility inpatient short form is 20, standardized score is 43 99  Patients at this level are likely to benefit from DC to home  However, please refer to therapist recommendation for safe DC planning     AM-PAC Basic Mobility Inpatient   Turning in Bed Without Bedrails 4   Lying on Back to Sitting on Edge of Flat Bed 4   Moving Bed to Chair 3   Standing Up From Chair 3   Walk in Room 3   Climb 3-5 Stairs 3   Basic Mobility Inpatient Raw Score 20   Basic Mobility Standardized Score 43 99   Highest Level Of Mobility   JH-HLM Goal 6: Walk 10 steps or more   JH-HLM Achieved 7: Walk 25 feet or more   Education   Education Provided Mobility training;Home exercise program;Assistive device   Patient Demonstrates acceptance/verbal understanding     Treatment Time: 6760-1775    Jo-Ann Staton, PT

## 2022-07-14 NOTE — PROGRESS NOTES
Progress Note - Nephrology   Radha Gracia 74 y o  male MRN: [id number]  Unit/Bed#: ICU 11-01 Encounter: [id number]    A/P:  1  ESRD hemodialysis dependent at [facility] MWF   - will have a routine treatment tomorrow   - check blood cultures from dialysis catheter   - no Epogen due to history of colon cancer   - EDW 90 kg    2  MSSA bacteremia   - source likely diabetic foot ulcer   - followed by podiatry   - will need 6 weeks of IV antibiotics   - can be provided after dialysis in the dialysis clinic   - WBC is declining to 12 6 today    3  Complete heart block   - has PPMI - defer removal    4  Diabetes mellitus 2 with long term use of insulin   - continue to cover sugars with insulin    5  Persistent atrial fibrillation   - has rate control   - followed by cardiology    6  Severe sepsis with lactic acidosis   - resolved    7  Secondary hyperparathyroidism of renal origin   - hold sevelamer 800 with meals due to hypophosphatemia   - encourage protein intake   - taking Glucerna with meals    8  Anemia of chronic kidney disease   - as noted above - no Epogen due to history of colon cancer   - No IV iron due to infection/inflammation   - transfuse for hemoglobin < 7 5    9   Intradialytic hypotension   - receiving midodrine 5mg before dialysis and dose can be increased if needed      Follow up reason for today's visit: ESRD hemodialysis dependent MWF at [facility]    Diabetic infection of left foot [facility])    Patient Active Problem List   Diagnosis    Bilateral leg edema    Diabetic ulcer of toe of left foot associated with type 2 diabetes mellitus ([facility] [address] )    Easy bruising    Eczema    Erectile dysfunction of non-organic origin    Hyperlipidemia    Uremic acidosis    Arthritis    Peripheral arterial disease (HCC)    PVCs (premature ventricular contractions)    Rhinitis    Systolic murmur    Vertigo    Complete heart block (HCC)    Metabolic acidosis    ELZBIETA (acute kidney injury) ([facility] [address] )    Other hyperlipidemia    Persistent atrial fibrillation (HCC)    Persistent proteinuria    Volume overload    Urinary retention    NICOLASA (obstructive sleep apnea)    Type 2 diabetes mellitus with chronic kidney disease, with long-term current use of insulin (HCC)    Hypertension    Stage 4 chronic kidney disease (HCC)    Aortic valve stenosis, nonrheumatic    Anemia    Fever    Mitral stenosis    Abnormal CT of the chest    Hyperkalemia    Acute pulmonary edema (HCC)    Chronic diastolic (congestive) heart failure (HCC)    Left renal artery stenosis (HCC)    S/P amputation of lesser toe, left (HCC)    S/P amputation of lesser toe, right (HCC)    Elevated troponin I level    Staphylococcus aureus bacteremia    Type 2 diabetes mellitus with diabetic neuropathy, without long-term current use of insulin (HCC)    Hyponatremia    Iron deficiency anemia    Anxiety    Osteomyelitis of left foot (HCC)    Amputation of left great toe (HCC)    Multiple drug resistant organism (MDRO) culture positive    Renovascular hypertension    SSS (sick sinus syndrome) (HCC)    Chronic obstructive pulmonary disease (HCC)    Absence of toe (HCC)    Chronic painful diabetic neuropathy (HCC)    Onychomycosis    Colon cancer (HCC)    Acute kidney injury superimposed on chronic kidney disease (HCC)    RSV (respiratory syncytial virus pneumonia)    Chemotherapy-induced neutropenia (HCC)    Chemotherapy-induced nausea    Acute on chronic diastolic congestive heart failure (HCC)    Other iron deficiency anemias    Pulmonary hypertension (HCC)    S/P arteriovenous (AV) fistula creation    Severe sepsis with lactic acidosis (HCC)    Diabetic infection of left foot (HCC)    Gram-positive bacteremia    Acute respiratory failure with hypoxia (HCC)    ESRD (end stage renal disease) (HCC)         Subjective:   A 10 point ROS is negative   His appetite is better and he is voiding freely  He feels markedly improved    Objective:     Vitals: Blood pressure 123/58, pulse 60, temperature 99 2 °F (37 3 °C), temperature source Tympanic, resp  rate 16, height 5' 10" (1 778 m), weight 93 kg (205 lb 0 4 oz), SpO2 94 %  ,Body mass index is 29 42 kg/m²  Weight (last 2 days)     None            Intake/Output Summary (Last 24 hours) at 7/14/2022 1405  Last data filed at 7/14/2022 0901  Gross per 24 hour   Intake 860 ml   Output 3502 ml   Net -2642 ml     I/O last 3 completed shifts: In: 1280 [P O :780; I V :500]  Out: 3502 [Other:3502]    HD Permanent Double Catheter (Active)   Reasons to continue HD Cath Treatment Therapy 07/13/22 2000   Goal for Removal N/A- chronic HD catheter 07/13/22 2000   Line Necessity Reviewed Yes, reviewed with provider 07/13/22 2000   Site Assessment WDL 07/13/22 2000   Proximal Lumen Status Normal saline locked 07/13/22 2000   Distal Lumen Status Normal saline locked 07/13/22 2000   Dressing Type Chlorhexidine dressing 07/13/22 2000   Dressing Status Clean;Dry; Intact 07/13/22 2000   Dressing Intervention 7 day central line dressing changed 07/13/22 2000   Dressing Change Due 07/20/22 07/13/22 2000       Physical Exam: /58 (BP Location: Right arm)   Pulse 60   Temp 99 2 °F (37 3 °C) (Tympanic)   Resp 16   Ht 5' 10" (1 778 m)   Wt 93 kg (205 lb 0 4 oz)   SpO2 94%   BMI 29 42 kg/m²     General Appearance:    Alert, cooperative, no distress, appears stated age   Head:    Normocephalic, without obvious abnormality, atraumatic   Eyes:    Conjunctiva/corneas clear   Ears:    Normal external ears   Nose:   Nares normal, septum midline, mucosa normal, no drainage    or sinus tenderness   Throat:   Lips, mucosa, and tongue normal; teeth and gums normal   Neck:   Supple, symmetrical, trachea midline, no adenopathy;        thyroid:  No enlargement/tenderness/nodules; no carotid    bruit or JVD   Back:     Symmetric, no curvature, ROM normal, no CVA tenderness   Lungs:     Clear to auscultation bilaterally, respirations unlabored   Chest wall:    No tenderness or deformity PrmCath right chest   Heart:    Regular rate and rhythm, S1 and S2 normal, systolic murmur, rub   or gallop   Abdomen:     Soft, non-tender, bowel sounds active   Extremities:   Extremities normal, atraumatic, no cyanosis or edema   Skin:   Skin color, texture, turgor normal, no rashes or lesions   Lymph nodes:   Cervical normal   Neurologic:   CNII-XII intact            Lab, Imaging and other studies: I have personally reviewed pertinent labs  CBC:   Lab Results   Component Value Date    WBC 12 62 (H) 07/14/2022    HGB 8 0 (L) 07/14/2022    HCT 25 9 (L) 07/14/2022     (H) 07/14/2022     07/14/2022    MCH 32 1 07/14/2022    MCHC 30 9 (L) 07/14/2022    RDW 17 5 (H) 07/14/2022    MPV 10 1 07/14/2022    NRBC 0 07/14/2022     CMP:   Lab Results   Component Value Date    K 4 3 07/14/2022    CL 96 07/14/2022    CO2 28 07/14/2022    BUN 21 07/14/2022    CREATININE 2 34 (H) 07/14/2022    CALCIUM 8 1 (L) 07/14/2022    EGFR 27 07/14/2022           Results from last 7 days   Lab Units 07/14/22  0610 07/13/22  0444 07/12/22  0459   POTASSIUM mmol/L 4 3 3 7 3 9   CHLORIDE mmol/L 96 97 95*   CO2 mmol/L 28 27 28   BUN mg/dL 21 40* 34*   CREATININE mg/dL 2 34* 3 13* 2 77*   CALCIUM mg/dL 8 1* 8 5 8 1*         Phosphorus:   Lab Results   Component Value Date    PHOS 2 1 (L) 07/14/2022     Magnesium:   Lab Results   Component Value Date    MG 2 0 07/14/2022     Urinalysis: No results found for: COLORU, CLARITYU, SPECGRAV, PHUR, LEUKOCYTESUR, NITRITE, PROTEINUA, GLUCOSEU, KETONESU, BILIRUBINUR, BLOODU  Ionized Calcium: No results found for: CAION  Coagulation: No results found for: PT, INR, APTT  Troponin: No results found for: TROPONINI  ABG: No results found for: PHART, HUI1DBR, PO2ART, XZH1YYM, N8GQRPFE, BEART, SOURCE  Radiology review:     IMAGING  Procedure: VAS lower limb arterial duplex, complete bilateral    Result Date: 7/13/2022  Narrative:  [facility] REPORT CLINICAL: Operative History: 2020-01-12 Left I and D with removal of sesmoid bone 2020-01-08 Left great toe amputation Pacemaker partial B/L hallux amputation Risk Factors The patient has history of HTN, Diabetes, Hyperlipidemia, CKD, PAD and previous smoking (quit 5-10yrs ago)  FINDINGS:  Segment                Right                   Left                                          Impression  PSV (cm/s)  PSV (cm/s)  Common Femoral Artery  50-75%             260         146  Prox Profunda                              71         117  Prox SFA                                  146         161  Mid SFA                                   153         148  Dist SFA                                  112         141  Proximal Pop                               80         153  Distal Pop                                 80         120  Dist Post Tibial                          134         135  Prox  Ant  Tibial                          46              Dist  Ant  Tibial                          47          27     CONCLUSION: Impression: RIGHT LOWER LIMB: There is a 50-75% stenosis in the common femoral artery  Ankle/Brachial index: Unreliable secondary to poorly compressible vessels  (Prior 0 96) PVR/ PPG tracings are dampened  Metatarsal pressure 164 mmHg  Great toe pressure of 59 mmHg, within the diabetic healing range  LEFT LOWER LIMB: Diffuse disease noted throughout the femoral-popliteal arteries without significant focal stenosis  Ankle/Brachial index: Unreliable secondary to poorly compressible vessels  (Prior: same) PVR tracings are normal  Metatarsal pressure of 82 mmHg Great toe has been amputated  Compared to previous study on 1/13/2020, there is now a stenosis of the R CFA, the R EM is now unreliable    SIGNATURE: Electronically Signed by: Alberto De Jesus MD on 2022-07-13 04:33:21 PM    Procedure: NM ceretec abscess localization limited    Result Date: 7/13/2022  Narrative: CERETEC WHITE BLOOD CELL STUDY INDICATION:Left foot ceretec bone scan  Suspect 1st met and 2nd metatarsal osteomyelitis with underling abscess  COMPARISON:   Left foot radiographs 7/7/2022 and priors TECHNIQUE: The study was performed following the intravenous administration of 27 9 mCi Tc-99m labeled Ceretec white blood cells  Static images of the feet were acquired in multiple projections  Imaging was obtained 3 hours and 22 hours post injection  FINDINGS: There is persistent radiotracer activity on the 3 hour and 24-hour images in the distal left foot  Activity is amorphous, but appears to be centered in the region of the left 2nd proximal phalanx amputation site and surrounding soft tissue  Findings correspond with the history of cellulitis /abscess  Osseous involvement is difficult to exclude due to the amorphous appearance of the radiotracer, even on the delayed 22 hour images  Impression: 1  Persistent radiotracer activity centered in the region of the left 2nd proximal phalanx amputation site and surrounding soft tissues, corresponding with the history of cellulitis/abscess  Osseous involvement is difficult to exclude due to the amorphous appearance of the radiotracer  Workstation performed: [id number]     Procedure: IR port Removal    Result Date: 7/12/2022  Narrative: Right chest port removal Clinical History: Colon cancer  Multiple active medical issues including end-stage renal disease on dialysis, heart disease with pacemaker  Foot infection  Now with Staphylococcus aureus bacteremia Multidisciplinary discussion regarding intravascular device management, with plan at this time to remove the port which will no longer be used  Fluoro time: 0 5 minutes Conscious sedation time: 30 minutes Technique: The patient was brought to the interventional radiology suite and identified verbally and by wristband  Risks benefits alternatives were discussed    The patient was placed supine on the table, and the existing port site was prepped and draped in the usual sterile fashion  All elements of maximal sterile barrier technique were followed (cap, mask, sterile gown, sterile gloves, large sterile sheet, hand hygiene, and 2% chlorhexidine for cutaneous antisepsis)  Timeout was performed  A preprocedure fluoroscopic image was obtained  Lidocaine was administered to the skin of the old incision site and port pocket  The incision was opened, and the port hub was dissected free  Once control of the hub was obtained, the port was removed in its entirety under live fluoroscopic guidance   The tip was sent for culture  Hemostasis was achieved  Removal was confirmed fluoroscopically  The incision was closed using deep and interrupted Vicryl sutures  Medical glue was then applied to the incision site  A sterile dressing was applied  The patient tolerated the procedure well and there were no immediate complications  Findings: Preprocedure imaging with right tunneled dialysis catheter, right chest port, pacemaker  Final imaging with poor removed and unchanged position of tunneled catheter     Impression: Fluoroscopically guided removal of right chest port The tunnel dialysis catheter will be managed based on upcoming ELBERT, blood cultures  It may be appropriate to consider over-the-wire exchange in this patient with limited access options   Workstation performed: [id number]       Current Facility-Administered Medications   Medication Dose Route Frequency    acetaminophen (TYLENOL) tablet 650 mg  650 mg Oral Q6H PRN    amLODIPine (NORVASC) tablet 10 mg  10 mg Oral Daily    apixaban (ELIQUIS) tablet 5 mg  5 mg Oral BID    ceFAZolin (ANCEF) IVPB (premix in dextrose) 1,000 mg 50 mL  1,000 mg Intravenous Q24H    insulin glargine (LANTUS) subcutaneous injection 10 Units 0 1 mL  10 Units Subcutaneous HS    insulin lispro (HumaLOG) 100 units/mL subcutaneous injection 1-6 Units  1-6 Units Subcutaneous TID AC    insulin lispro (HumaLOG) 100 units/mL subcutaneous injection 1-6 Units  1-6 Units Subcutaneous HS    loperamide (IMODIUM) capsule 2 mg  2 mg Oral Q4H PRN    midodrine (PROAMATINE) tablet 5 mg  5 mg Oral Once per day on Mon Wed Fri    polyethylene glycol (MIRALAX) packet 17 g  17 g Oral Daily PRN    senna-docusate sodium (SENOKOT S) 8 6-50 mg per tablet 1 tablet  1 tablet Oral HS    sevelamer (RENAGEL) tablet 800 mg  800 mg Oral TID With Meals    tamsulosin (FLOMAX) capsule 0 4 mg  0 4 mg Oral Daily With Dinner    torsemide [facility]) tablet 80 mg  80 mg Oral Once per day on Sun Tue Thu Sat     Medications Discontinued During This Encounter   Medication Reason    vancomycin (VANCOCIN) 1,250 mg in sodium chloride 0 9 % 250 mL IVPB     allopurinol (ZYLOPRIM) tablet 300 mg     cefepime (MAXIPIME) IVPB (premix in dextrose) 1,000 mg 50 mL     sodium bicarbonate tablet 650 mg     vancomycin (VANCOCIN) IVPB (premix in dextrose) 1,000 mg 200 mL     apixaban (ELIQUIS) tablet 5 mg     metoprolol tartrate (LOPRESSOR) tablet 50 mg     vancomycin (VANCOCIN) IVPB (premix in dextrose) 1,000 mg 200 mL     vancomycin (VANCOCIN) 1500 mg in sodium chloride 0 9% 250 mL IVPB        Yvonne Osborne MD      This progress note was produced in part using a dictation device which may document imprecise wording from author's original intent

## 2022-07-15 ENCOUNTER — APPOINTMENT (INPATIENT)
Dept: DIALYSIS | Facility: HOSPITAL | Age: 67
DRG: 853 | End: 2022-07-15
Payer: MEDICARE

## 2022-07-15 ENCOUNTER — ANESTHESIA (INPATIENT)
Dept: PERIOP | Facility: HOSPITAL | Age: 67
DRG: 853 | End: 2022-07-15
Payer: MEDICARE

## 2022-07-15 LAB
ANION GAP SERPL CALCULATED.3IONS-SCNC: 8 MMOL/L (ref 4–13)
BACTERIA CATH TIP CULT: NO GROWTH
BASOPHILS # BLD AUTO: 0.02 THOUSANDS/ΜL (ref 0–0.1)
BASOPHILS NFR BLD AUTO: 0 % (ref 0–1)
BUN SERPL-MCNC: 34 MG/DL (ref 5–25)
CALCIUM SERPL-MCNC: 8.5 MG/DL (ref 8.4–10.2)
CHLORIDE SERPL-SCNC: 98 MMOL/L (ref 96–108)
CO2 SERPL-SCNC: 27 MMOL/L (ref 21–32)
CREAT SERPL-MCNC: 3.05 MG/DL (ref 0.6–1.3)
EOSINOPHIL # BLD AUTO: 0.35 THOUSAND/ΜL (ref 0–0.61)
EOSINOPHIL NFR BLD AUTO: 3 % (ref 0–6)
ERYTHROCYTE [DISTWIDTH] IN BLOOD BY AUTOMATED COUNT: 17.1 % (ref 11.6–15.1)
GFR SERPL CREATININE-BSD FRML MDRD: 19 ML/MIN/1.73SQ M
GLUCOSE SERPL-MCNC: 118 MG/DL (ref 65–140)
GLUCOSE SERPL-MCNC: 126 MG/DL (ref 65–140)
GLUCOSE SERPL-MCNC: 138 MG/DL (ref 65–140)
GLUCOSE SERPL-MCNC: 158 MG/DL (ref 65–140)
GLUCOSE SERPL-MCNC: 188 MG/DL (ref 65–140)
HCT VFR BLD AUTO: 25.3 % (ref 36.5–49.3)
HGB BLD-MCNC: 8.1 G/DL (ref 12–17)
IMM GRANULOCYTES # BLD AUTO: 0.21 THOUSAND/UL (ref 0–0.2)
IMM GRANULOCYTES NFR BLD AUTO: 2 % (ref 0–2)
LYMPHOCYTES # BLD AUTO: 1.02 THOUSANDS/ΜL (ref 0.6–4.47)
LYMPHOCYTES NFR BLD AUTO: 9 % (ref 14–44)
MAGNESIUM SERPL-MCNC: 2 MG/DL (ref 1.9–2.7)
MCH RBC QN AUTO: 33.1 PG (ref 26.8–34.3)
MCHC RBC AUTO-ENTMCNC: 32 G/DL (ref 31.4–37.4)
MCV RBC AUTO: 103 FL (ref 82–98)
MONOCYTES # BLD AUTO: 1.06 THOUSAND/ΜL (ref 0.17–1.22)
MONOCYTES NFR BLD AUTO: 9 % (ref 4–12)
NEUTROPHILS # BLD AUTO: 8.97 THOUSANDS/ΜL (ref 1.85–7.62)
NEUTS SEG NFR BLD AUTO: 77 % (ref 43–75)
NRBC BLD AUTO-RTO: 0 /100 WBCS
PHOSPHATE SERPL-MCNC: 2.8 MG/DL (ref 2.3–4.1)
PLATELET # BLD AUTO: 252 THOUSANDS/UL (ref 149–390)
PMV BLD AUTO: 9.9 FL (ref 8.9–12.7)
POTASSIUM SERPL-SCNC: 3.9 MMOL/L (ref 3.5–5.3)
RBC # BLD AUTO: 2.45 MILLION/UL (ref 3.88–5.62)
SODIUM SERPL-SCNC: 133 MMOL/L (ref 135–147)
VANCOMYCIN SERPL-MCNC: <5 UG/ML (ref 10–20)
WBC # BLD AUTO: 11.63 THOUSAND/UL (ref 4.31–10.16)

## 2022-07-15 PROCEDURE — 87040 BLOOD CULTURE FOR BACTERIA: CPT | Performed by: INTERNAL MEDICINE

## 2022-07-15 PROCEDURE — 80048 BASIC METABOLIC PNL TOTAL CA: CPT | Performed by: INTERNAL MEDICINE

## 2022-07-15 PROCEDURE — 84100 ASSAY OF PHOSPHORUS: CPT | Performed by: INTERNAL MEDICINE

## 2022-07-15 PROCEDURE — 82948 REAGENT STRIP/BLOOD GLUCOSE: CPT

## 2022-07-15 PROCEDURE — 85025 COMPLETE CBC W/AUTO DIFF WBC: CPT | Performed by: INTERNAL MEDICINE

## 2022-07-15 PROCEDURE — 90935 HEMODIALYSIS ONE EVALUATION: CPT | Performed by: INTERNAL MEDICINE

## 2022-07-15 PROCEDURE — G0408 INPT/TELE FOLLOW UP 35: HCPCS | Performed by: STUDENT IN AN ORGANIZED HEALTH CARE EDUCATION/TRAINING PROGRAM

## 2022-07-15 PROCEDURE — NC001 PR NO CHARGE: Performed by: INTERNAL MEDICINE

## 2022-07-15 PROCEDURE — 83735 ASSAY OF MAGNESIUM: CPT | Performed by: INTERNAL MEDICINE

## 2022-07-15 PROCEDURE — 80202 ASSAY OF VANCOMYCIN: CPT | Performed by: INTERNAL MEDICINE

## 2022-07-15 PROCEDURE — 99232 SBSQ HOSP IP/OBS MODERATE 35: CPT | Performed by: INTERNAL MEDICINE

## 2022-07-15 RX ADMIN — CEFAZOLIN SODIUM 1000 MG: 1 SOLUTION INTRAVENOUS at 16:03

## 2022-07-15 RX ADMIN — INSULIN LISPRO 1 UNITS: 100 INJECTION, SOLUTION INTRAVENOUS; SUBCUTANEOUS at 08:39

## 2022-07-15 RX ADMIN — AMLODIPINE BESYLATE 10 MG: 10 TABLET ORAL at 21:47

## 2022-07-15 RX ADMIN — INSULIN LISPRO 1 UNITS: 100 INJECTION, SOLUTION INTRAVENOUS; SUBCUTANEOUS at 21:49

## 2022-07-15 RX ADMIN — MIDODRINE HYDROCHLORIDE 5 MG: 5 TABLET ORAL at 08:39

## 2022-07-15 RX ADMIN — INSULIN GLARGINE 10 UNITS: 100 INJECTION, SOLUTION SUBCUTANEOUS at 21:49

## 2022-07-15 RX ADMIN — TAMSULOSIN HYDROCHLORIDE 0.4 MG: 0.4 CAPSULE ORAL at 16:03

## 2022-07-15 NOTE — WOUND OSTOMY CARE
Progress Note - Wound   Rich Rider 74 y o  male MRN: [id number]  Unit/Bed#: ICU 11-01 Encounter: [id number]    Assessment:   Wound care follow up for patient admitted with diabetic wound infection of the left foot  Patient in bed for assessment, he is awake, alert and oriented  He is to be going for surgery today for the left foot infection  Per RN, buttocks and sacrum intact  Patient is independent in the bed for repositioning, he is not incontinent  Findings  1  Right heel intact and blanchable  2  Right foot 1st digit partial amputation in the past  Old dressing removed from toe, diabetic ulcer noted to the plantar surface, patient states it comes and goes  He sees a podiatrist for this wound  The wound base is beefy red with a callused periwound, no erythema  Wound has depth  Old bloody drainage on dressing, not currently draining  Reported to Dr Ludwig via tiger text to request him to evaluate this wound  3  Left mid lower and distal lower arm on medial side, two areas of eschar  The eschars are loose and partially unroofed, pink periwound, no erythema, no drainage    Skin and Wound Care Plan:   1  Lower extremity wound care per podiatry  2  Apply skin nourishing cream to the skin daily  3  Elevate heels off of bed with pillows to offload  4  Apply hydraguard to buttocks and sacrum BID and PRN  5  Turn and reposition patient Q2 hours  6  Allevyn life silicone bordered foam dressing to the right heel, won with P, date, peel back daily for skin assessment, reapply dressing, change every 3 days or PRN  7  Apply 3M No Sting the intact scabs on the left lower arm daily  8  Cleanse wound to plantar surface of the right great toe with NSS, place Maxorb Ag on wound bed, top with bandaid every other day until seen by podiatry for further orders    Wound;   Wound 11/29/21 Diabetic Ulcer Toe (Comment  which one) Right (Active)   Wound Image   07/15/22 1058   Wound Description Beefy red;Pink 07/15/22 1053 Marla-wound Assessment Callus 07/15/22 1058   Wound Length (cm) 0 4 cm 07/15/22 1058   Wound Width (cm) 0 4 cm 07/15/22 1058   Wound Depth (cm) 0 2 cm 07/15/22 1058   Wound Surface Area (cm^2) 0 16 cm^2 07/15/22 1058   Wound Volume (cm^3) 0 032 cm^3 07/15/22 1058   Calculated Wound Volume (cm^3) 0 03 cm^3 07/15/22 1058   Drainage Amount None 07/15/22 1058   Drainage Description Bloody 07/15/22 1058   Non-staged Wound Description Full thickness 07/15/22 1058   Treatments Irrigation with NSS;Cleansed 07/15/22 1058   Dressing Calcium Alginate with Silver; Other (Comment) 07/15/22 1058   Wound packed?  No 07/15/22 1058   Dressing Changed Changed 07/15/22 1058   Patient Tolerance Tolerated well 07/15/22 1058           Wound 07/08/22  Arm Anterior;Left;Lower (Active)   Wound Image   07/11/22 1201   Wound Description Dry;Brown;Eschar 07/15/22 1033   Marla-wound Assessment Pink 07/15/22 1033   Wound Length (cm) 1 5 cm 07/15/22 1033   Wound Width (cm) 0 5 cm 07/15/22 1033   Wound Depth (cm) 0 cm 07/15/22 1033   Wound Surface Area (cm^2) 0 75 cm^2 07/15/22 1033   Wound Volume (cm^3) 0 cm^3 07/15/22 1033   Calculated Wound Volume (cm^3) 0 cm^3 07/15/22 1033   Drainage Amount None 07/15/22 1033   Dressing Open to air 07/15/22 1033   Patient Tolerance Tolerated well 07/15/22 1033       Wound 07/08/22  Arm Anterior;Distal;Left;Lower (Active)   Wound Image   07/15/22 1033   Wound Description Dry;Brown;Eschar 07/15/22 1033   Marla-wound Assessment Pink 07/15/22 1033   Wound Length (cm) 0 5 cm 07/15/22 1033   Wound Width (cm) 0 5 cm 07/15/22 1033   Wound Depth (cm) 0 cm 07/15/22 1033   Wound Surface Area (cm^2) 0 25 cm^2 07/15/22 1033   Wound Volume (cm^3) 0 cm^3 07/15/22 1033   Calculated Wound Volume (cm^3) 0 cm^3 07/15/22 1033   Drainage Amount None 07/15/22 1033   Dressing Open to air 07/15/22 1033   Patient Tolerance Tolerated well 07/15/22 1033     Reviewed plan of care with primary RN Peggy Vinson  Recommendations written as orders  Wound care team to follow weekly while admitted  Questions or concerns Elmo AVILEZN, RN, [employer]

## 2022-07-15 NOTE — PLAN OF CARE
Problem: INFECTION - ADULT  Goal: Absence or prevention of progression during hospitalization  Description: INTERVENTIONS:  - Assess and monitor for signs and symptoms of infection  - Monitor lab/diagnostic results  - Monitor all insertion sites, i e  indwelling lines, tubes, and drains  - Monitor endotracheal if appropriate and nasal secretions for changes in amount and color  - Hyndman appropriate cooling/warming therapies per order  - Administer medications as ordered  - Instruct and encourage patient and family to use good hand hygiene technique  - Identify and instruct in appropriate isolation precautions for identified infection/condition  Outcome: Progressing  Goal: Absence of fever/infection during neutropenic period  Description: INTERVENTIONS:  - Monitor WBC    Outcome: Progressing

## 2022-07-15 NOTE — PROGRESS NOTES
HEMODIALYSIS PROCEDURE NOTE  The patient was seen and examined on hemodialysis    Time: 2 hours  Sodium: 138 Blood flow: 400   Dialyzer: F160 Potassium: 4 Dialysate flow: 600   Access: PermCath Bicarbonate: 35 Ultrafiltration goal: 1   Medications on HD: none   Blood pressure is 137/63  He has no complaints  Tolerating treatment well  Heart RR  Lungs clear  Ext no edema  He will have 2 hours of treatment today and have toe amputation this afternoon  He will have 2 hours of treatment tomorrow  dialysis aware

## 2022-07-15 NOTE — PROGRESS NOTES
[address]  Progress Note Judith Menchaca 1955, 74 y o  male MRN: [id number]  Unit/Bed#: ICU 11-01 Encounter: [id number]  Primary Care Provider: Christina Ventura DO   Date and time admitted to hospital: 7/7/2022 12:44 PM    * Diabetic infection of left foot [facility])  Assessment & Plan  · Status post incision and drainage at the bedside with Podiatry  · Continue cefazolin as per ID  · Pharmacy consult for vancomycin trough management  · Podiatry consulted ; appreciate recommendations  · Follow-up bone scan  · Planned for amputation of 2nd left toe today with Podiatry  · Infectious Diseases following    Acute respiratory failure with hypoxia ([address] )  Assessment & Plan  · Requiring 2 L of nasal cannula supplemental O2 this morning  · Possibly secondary to volume overload  · Wean O2 as tolerated  · Nephrology following ; appreciate recommendations regarding possible diuresis  · Will remove volume during HD treatments  · Goal SpO2 > 90%    Severe sepsis with lactic acidosis ([address] )  Assessment & Plan  · Resolved with IV fluid resuscitation and broad-spectrum IV antibiotics  · Secondary to diabetic foot infection present on admission with fever and leukocytosis  · Lactic acidosis resolved  · Will follow-up bone scan of left foot  · Continue IV antibiotics with cefazolin  · Infectious Diseases following  · Trend procalcitonin  · Blood cultures positive for gram-positive cocci ; repeat blood cultures with no growth    Gram-positive bacteremia  Assessment & Plan  · Source likely left diabetic toe ulcer  · Patient has cleared bacteremia  · TTE with no signs of agitation  · After multi disciplinary discussion it was decided that patient is too high risk for ppm removal and will instead undergo 6 weeks of IV antibiotics in the setting of Gram-positive bacteremia with hardware in place  · Likely MSSA  · Repeat blood cultures with no growth to date  · Continue cefazolin as per ID  · Follow-up repeat blood cultures ; no growth at 48 hours  · No need for ELBERT or ppm removal at this time as per ID ; will need extended course of IV antibiotics  · Chemo port has been removed by IR as patient will no longer be undergoing chemotherapy    ESRD (end stage renal disease) [facility])  Assessment & Plan  Lab Results   Component Value Date    EGFR 22 07/12/2022    EGFR 15 07/11/2022    EGFR 14 07/10/2022    CREATININE 2 77 (H) 07/12/2022    CREATININE 3 85 (H) 07/11/2022    CREATININE 4 06 (H) 07/10/2022   · ESRD on HD Monday/Wednesday/Friday  · Nephrology following ; appreciate recommendations  · 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, sevelamer    Colon cancer [facility])  Assessment & Plan  · Patient follows with Hematology/Oncology  · Last chemo session was on 07/05/2022  · Chemo-port will be removed in the setting of Gram-positive bacteremia    Type 2 diabetes mellitus with chronic kidney disease, with long-term current use of insulin (HCC)  Assessment & Plan  · Will monitor fingersticks  · Insulin sliding scale  · Lantus 10 units at bedtime  · Diabetic diet  · Will adjust diabetic regimen as needed    Persistent atrial fibrillation (HCC)  Assessment & Plan  · Will continue Lopressor  · Continue home Eliquis  · Will discontinue 48 hours prior to surgery    VTE Pharmacologic Prophylaxis: Eliquis    Patient Centered Rounds: Patient care rounds were performed with nursing    Discussions with Specialists or Other Care Team Provider:  Case Management, Nursing, PT/OT    Education and Discussions with Family / Patient: Spoke with patient's wife via telephone    Time Spent for Care: 30  More than 50% of total time spent on counseling and coordination of care as described above      Current Length of Stay: 8 day(s)    Current Patient Status: Inpatient     Certification Statement: The patient will continue to require additional inpatient hospital stay due to amputation of left 2nd toe    Discharge Plan: Pending PT/OT evaluation and treatment    Code Status: Level 1 - Full Code      Subjective:   Patient seen and examined at bedside  No acute events overnight  Denies chest pain, SOB, diaphoresis, nausea/vomiting/diarrhea, fevers/chills  Objective:     Vitals:   Temp (24hrs), Av 5 °F (37 5 °C), Min:98 9 °F (37 2 °C), Max:100 8 °F (38 2 °C)    Temp:  [98 9 °F (37 2 °C)-100 8 °F (38 2 °C)] 98 9 °F (37 2 °C)  HR:  [63-65] 64  Resp:  [16-21] 18  BP: (141-155)/(65-69) 150/69  SpO2:  [92 %-94 %] 93 %  Body mass index is 28 12 kg/m²  Input and Output Summary (last 24 hours): Intake/Output Summary (Last 24 hours) at 7/15/2022 0937  Last data filed at 7/15/2022 0800  Gross per 24 hour   Intake 640 ml   Output --   Net 640 ml       Physical Exam:     Physical Exam  Vitals and nursing note reviewed  Constitutional:       Appearance: He is well-developed  HENT:      Head: Normocephalic and atraumatic  Eyes:      Conjunctiva/sclera: Conjunctivae normal    Cardiovascular:      Rate and Rhythm: Normal rate and regular rhythm  Heart sounds: No murmur heard  Pulmonary:      Effort: Pulmonary effort is normal  No respiratory distress  Breath sounds: Normal breath sounds  Abdominal:      Palpations: Abdomen is soft  Tenderness: There is no abdominal tenderness  Musculoskeletal:      Cervical back: Neck supple  Comments: Left diabetic toe ulcer   Skin:     General: Skin is warm and dry  Neurological:      Mental Status: He is alert  Additional Data:     Labs:  I have reviewed pertinent results     Results from last 7 days   Lab Units 07/15/22  0556   WBC Thousand/uL 11 63*   HEMOGLOBIN g/dL 8 1*   HEMATOCRIT % 25 3*   PLATELETS Thousands/uL 252   NEUTROS PCT % 77*   LYMPHS PCT % 9*   MONOS PCT % 9   EOS PCT % 3     Results from last 7 days   Lab Units 07/15/22  0556   SODIUM mmol/L 133*   POTASSIUM mmol/L 3 9   CHLORIDE mmol/L 98   CO2 mmol/L 27   BUN mg/dL 34*   CREATININE mg/dL 3 05*   ANION GAP mmol/L 8   CALCIUM mg/dL 8 5   GLUCOSE RANDOM mg/dL 118     Results from last 7 days   Lab Units 07/12/22  0459   INR  2 13*     Results from last 7 days   Lab Units 07/15/22  0726 07/14/22  2139 07/14/22  1552 07/14/22  1150 07/14/22  0732 07/13/22  2147 07/13/22  1613 07/13/22  1108 07/13/22  0701 07/12/22  2115 07/12/22  1610 07/12/22  1050   POC GLUCOSE mg/dl 158* 213* 222* 276* 124 189* 194* 283* 116 237* 166* 166*                 Imaging: I have reviewed pertinent imaging       Recent Cultures (last 7 days):     Results from last 7 days   Lab Units 07/13/22  1354 07/09/22  0929 07/09/22  0542   BLOOD CULTURE  No Growth at 24 hrs  No Growth After 5 Days  No Growth After 5 Days  Last 24 Hours Medication List:   Current Facility-Administered Medications   Medication Dose Route Frequency Provider Last Rate    acetaminophen  650 mg Oral Q6H PRN Feroz Fenton PA-C      amLODIPine  10 mg Oral Daily Kelvin Anglin MD      cefazolin  1,000 mg Intravenous Q24H Yuki Valverde DO 1,000 mg (07/14/22 1553)    insulin glargine  10 Units Subcutaneous HS Kelvin Anglin MD      insulin lispro  1-6 Units Subcutaneous TID [facility] Mikaela Sutton MD      insulin lispro  1-6 Units Subcutaneous HS Harper Bonilla PA-C      loperamide  2 mg Oral Q4H PRN Yuki Valverde DO      midodrine  5 mg Oral Once per day on Mon Wed Fri Kelvin Anglin MD      polyethylene glycol  17 g Oral Daily PRN Yuki Valverde DO      senna-docusate sodium  1 tablet Oral HS Yuki Valverde DO      tamsulosin  0 4 mg Oral Daily With Irene Brock MD      torsemide  80 mg Oral Once per day on Sun Tue Thu Sat Kelvin Anglin MD          Today, Patient Was Seen By: Yuki Valverde DO    ** Please Note: Dictation voice to text software may have been used in the creation of this document   **

## 2022-07-15 NOTE — PLAN OF CARE
Problem: Potential for Falls  Goal: Patient will remain free of falls  Description: INTERVENTIONS:  - Educate patient/family on patient safety including physical limitations  - Instruct patient to call for assistance with activity   - Consult OT/PT to assist with strengthening/mobility   - Keep Call bell within reach  - Keep bed low and locked with side rails adjusted as appropriate  - Keep care items and personal belongings within reach  - Initiate and maintain comfort rounds  - Make Fall Risk Sign visible to staff  - Apply yellow socks and bracelet for high fall risk patients  - Consider moving patient to room near nurses station  Outcome: Progressing     Problem: MOBILITY - ADULT  Goal: Maintain or return to baseline ADL function  Description: INTERVENTIONS:  -  Assess patient's ability to carry out ADLs; assess patient's baseline for ADL function and identify physical deficits which impact ability to perform ADLs (bathing, care of mouth/teeth, toileting, grooming, dressing, etc )  - Assess/evaluate cause of self-care deficits   - Assess range of motion  - Assess patient's mobility; develop plan if impaired  - Assess patient's need for assistive devices and provide as appropriate  - Encourage maximum independence but intervene and supervise when necessary  - Involve family in performance of ADLs  - Assess for home care needs following discharge   - Consider OT consult to assist with ADL evaluation and planning for discharge  - Provide patient education as appropriate  Outcome: Progressing  Goal: Maintains/Returns to pre admission functional level  Description: INTERVENTIONS:  - Perform BMAT or MOVE assessment daily    - Set and communicate daily mobility goal to care team and patient/family/caregiver     - Collaborate with rehabilitation services on mobility goals if consulted  - Out of bed for toileting  - Record patient progress and toleration of activity level   Outcome: Progressing     Problem: SAFETY ADULT  Goal: Patient will remain free of falls  Description: INTERVENTIONS:  - Educate patient/family on patient safety including physical limitations  - Instruct patient to call for assistance with activity   - Consult OT/PT to assist with strengthening/mobility   - Keep Call bell within reach  - Keep bed low and locked with side rails adjusted as appropriate  - Keep care items and personal belongings within reach  - Initiate and maintain comfort rounds  - Make Fall Risk Sign visible to staff  - Apply yellow socks and bracelet for high fall risk patients  - Consider moving patient to room near nurses station  Outcome: Progressing  Goal: Maintain or return to baseline ADL function  Description: INTERVENTIONS:  -  Assess patient's ability to carry out ADLs; assess patient's baseline for ADL function and identify physical deficits which impact ability to perform ADLs (bathing, care of mouth/teeth, toileting, grooming, dressing, etc )  - Assess/evaluate cause of self-care deficits   - Assess range of motion  - Assess patient's mobility; develop plan if impaired  - Assess patient's need for assistive devices and provide as appropriate  - Encourage maximum independence but intervene and supervise when necessary  - Involve family in performance of ADLs  - Assess for home care needs following discharge   - Consider OT consult to assist with ADL evaluation and planning for discharge  - Provide patient education as appropriate  Outcome: Progressing  Goal: Maintains/Returns to pre admission functional level  Description: INTERVENTIONS:  - Perform BMAT or MOVE assessment daily    - Set and communicate daily mobility goal to care team and patient/family/caregiver     - Collaborate with rehabilitation services on mobility goals if consulted  - Out of bed for toileting  - Record patient progress and toleration of activity level   Outcome: Progressing

## 2022-07-15 NOTE — ANESTHESIA PREPROCEDURE EVALUATION
Procedure:  DEBRIDEMENT WOUND [facility] OUT), possible 2nd toe amputation (Left Foot)    Relevant Problems   CARDIO   (+) Acute on chronic diastolic congestive heart failure (HCC)   (+) Aortic valve stenosis, nonrheumatic   (+) Complete heart block (HCC)   (+) Hyperlipidemia   (+) Hypertension   (+) Other hyperlipidemia   (+) PVCs (premature ventricular contractions)   (+) Persistent atrial fibrillation (HCC)   (+) Renovascular hypertension   (+) SSS (sick sinus syndrome) (HCC)   (+) Systolic murmur      ENDO   (+) Type 2 diabetes mellitus with chronic kidney disease, with long-term current use of insulin (HCC)   (+) Type 2 diabetes mellitus with diabetic neuropathy, without long-term current use of insulin (HCC)      GI/HEPATIC   (+) Colon cancer (HCC)      /RENAL   (+) ELZBIETA (acute kidney injury) (HCC)   (+) Acute kidney injury superimposed on chronic kidney disease (HCC)   (+) ESRD (end stage renal disease) (HCC)   (+) Stage 4 chronic kidney disease (HCC)      HEMATOLOGY   (+) Anemia   (+) Iron deficiency anemia   (+) Other iron deficiency anemias      MUSCULOSKELETAL   (+) Arthritis      NEURO/PSYCH   (+) Anxiety   (+) Chronic painful diabetic neuropathy (HCC)      PULMONARY   (+) Acute respiratory failure with hypoxia (HCC)   (+) Chronic obstructive pulmonary disease (HCC)   (+) NICOLASA (obstructive sleep apnea)   (+) RSV (respiratory syncytial virus pneumonia)      Endocrine   (+) Diabetic infection of left foot ([address] )      Other   (+) Osteomyelitis of left foot ([address] )        Left Ventricle: Left ventricular cavity size is normal  Wall thickness is mildly increased  There is mild concentric hypertrophy  The left ventricular ejection fraction is 65%  Systolic function is normal  Wall motion is normal     Aortic Valve: The aortic valve is trileaflet  The leaflets are mildly thickened  The leaflets are moderately calcified  There is mildly reduced mobility  There is mild to moderate regurgitation   There is mild to moderate stenosis    Mitral Valve: There is moderate annular calcification  There is mild to moderate stenosis  The mitral valve mean gradient is 6 mmHg    Tricuspid Valve: There is mild to moderate regurgitation  The right ventricular systolic pressure is moderately elevated  The estimated right ventricular systolic pressure is 91 77 mmHg    IVC/SVC: The right atrial pressure is estimated at 15 0 mmHg  The inferior vena cava is dilated  Respirophasic changes were blunted (less than 50% variation)    Pericardium: There is a trivial pericardial effusion  There is a left pleural effusion        Physical Exam    Airway    Mallampati score: II  TM Distance: >3 FB  Neck ROM: full     Dental       Cardiovascular  Cardiovascular exam normal    Pulmonary  Pulmonary exam normal     Other Findings        Anesthesia Plan  ASA Score- 4     Anesthesia Type- IV sedation with anesthesia with ASA Monitors  Additional Monitors:   Airway Plan:           Plan Factors-Exercise tolerance (METS): >4 METS  Chart reviewed  EKG reviewed  Imaging results reviewed  Existing labs reviewed  Patient summary reviewed  Induction- intravenous  Postoperative Plan-     Informed Consent- Anesthetic plan and risks discussed with patient  I personally reviewed this patient with the CRNA  Discussed and agreed on the Anesthesia Plan with the CRNA  Skyler Steven

## 2022-07-15 NOTE — PLAN OF CARE
Patient tolerated treatment well  Blood pressure remained stable pre/during/post treatment  Patient ran 2 hours today and will run 2 hours tomorrow to make up for a full treatment due to surgery being scheduled  Post-Dialysis RN Treatment Note    Blood Pressure:  Pre 147/67 mm/Hg  Post 137/64 mmHg   EDW  89 5 kg    Weight:  Pre 90 1 kg   Post 89 3 kg   Mode of weight measurement: Bed Scale   Volume Removed  600 ml    Treatment duration 120 minutes    NS given  No    Treatment shortened? Yes, describe: Patient scheduled for OR earlier than expected  Patient ran 2 hours vs 4 hours  Dr Valentina Shultz aware  Medications given during Rx: Not Applicable   Estimated Kt/V  0 88   Access type: Permacath/TDC   Access Issues: No    Report called to primary nurse   Yes, Patricia Guerrero RN      Care plan reviewed for a treatment plan of 4 hours with net UF 600ml to EDW  Serum potassium 3 9 from 7/15/22 on a 4k/2 5ca bath  Blood cultures drawn prior to treatment initiation from [facility]  Patient to go to surgery after treatment today      Problem: METABOLIC, FLUID AND ELECTROLYTES - ADULT  Goal: Electrolytes maintained within normal limits  Description: INTERVENTIONS:  - Monitor labs and assess patient for signs and symptoms of electrolyte imbalances  - Administer electrolyte replacement as ordered  - Monitor response to electrolyte replacements, including repeat lab results as appropriate  - Instruct patient on fluid and nutrition as appropriate  Outcome: Progressing  Goal: Fluid balance maintained  Description: INTERVENTIONS:  - Monitor labs   - Monitor I/O and WT  - Instruct patient on fluid and nutrition as appropriate  - Assess for signs & symptoms of volume excess or deficit  Outcome: Progressing  Goal: Glucose maintained within target range  Description: INTERVENTIONS:  - Monitor Blood Glucose as ordered  - Assess for signs and symptoms of hyperglycemia and hypoglycemia  - Administer ordered medications to maintain glucose within target range  - Assess nutritional intake and initiate nutrition service referral as needed  Outcome: Progressing

## 2022-07-15 NOTE — QUICK NOTE
-patient started dialysis this afternoon while NPO, he is currently on dialysis and we are unable to perform the amputation as scheduled  -I will make him NPO after midnight, we will perform amputation at tomorrow morning

## 2022-07-15 NOTE — PROGRESS NOTES
REQUIRED DOCUMENTATION:      1  This service was provided via Telemedicine  2  Provider located at [facility]  3  TeleMed provider: Susannah Davis MD  4  Identify all parties in room with patient during tele consult:  RN  5  After connecting through [place], patient was identified by name and date of birth and assistant checked wristband  Patient was then informed that this was a Telemedicine visit and that the exam was being conducted confidentially over secure lines  My office door was closed  No one else was in the room  Patient acknowledged consent and understanding of privacy and security of the Telemedicine visit, and gave us permission to have the assistant stay in the room in order to assist with the history and to conduct the exam   I informed the patient that I have reviewed their record in Epic and presented the opportunity for them to ask any questions regarding the visit today  The patient agreed to participate  Progress Note - Infectious Disease   Rich Rider 74 y o  male MRN: [id number]  Unit/Bed#: ICU 11-01 Encounter: [id number]      Impression/Plan:    1  Sepsis, present on admission  Fever, leukocytosis  Due to MSSA bacteremia 2/2 left foot cellulitis  Patient clinically improving, afebrile without leukocytosis   -antibiotics as below   -monitor WBC count, fever curve     2  MSSA bacteremia  Due to left foot cellulitis  Repeat blood cultures negative  Challenging situation as patient has underlying PPM, left shoulder hardware, right chest diaylsis catheter and PortACath  Multidisciplinary discussion held regarding best management  TTE no vegetations but not diagnostic  Per discussions with patient and cardiology, then patient would not like to undergo major surgery, PPM removal  He would favor empiric treatment course and then invasive testing/treatments (ELBERT, PPM if infection relapses)  PortACath removed 7/12/22   Nephrology wishes to attempt HD catheter salvage given poor access  -continue IV Cefazolin 1g q24hr dosed after HD on HD days  At discharge, switch to 2g/2g/3g dosed after HD MWF    -recommend a 6 week course of IV antibiotics through 22   -check surveillance blood cultures 2 weeks after completion of antibiotics    -weekly CBCD and CMP on IV antibiotics    -will arrange ID follow up 2 weeks after discharge   -will need source control of left foot infection, planned for washout and possible 2nd toe amputation tomorrow    -ideally HD catheter would also be removed or exchanged; will await cultures from line  If negative can attempt salvage, but catheter will need to be removed if infection recurs      3  Left foot cellulitis and abscess  Status post bedside debridement by Podiatry 7/10  Cerectec scan nondiagnostic of osteomyelitis    -Podiatry following, plan for I&D and possible 2nd toe amputation tomorrow      4  Type 2 diabetes  Risk factor for infection, poor wound healing    -Recommend strict glycemic control to aid with wound healing     5  Sigmoid cancer  Last cycle chemotherapy was  via R chest port-a-cath  Risk factor for immune suppression    -Management per Heme-Onc     6  ESRD on dialysis  Via R permacath with no local signs of infection  Patient and nephrology wish to attempt catheter salvage      -Management per nephrology       Above management plan discussed in detail with the primary team   ID will follow  I spent 35 minutes in evaluation of the patient of which 20 minutes was in counseling/coordination of care    Antibiotics:  Abx day 9  Cefazolin day 5    Subjective:  Tmax 100 8 yesterday  Patient feeling well, no complaints  No fever, chills, chest pain, shortness of breath      Objective:  Vitals:  Temp:  [98 3 °F (36 8 °C)-100 8 °F (38 2 °C)] 98 5 °F (36 9 °C)  HR:  [60-65] 63  Resp:  [16-21] 18  BP: (137-155)/(55-69) 137/64  SpO2:  [92 %-94 %] 93 %  Temp (24hrs), Av 1 °F (37 3 °C), Min:98 3 °F (36 8 °C), Max:100 8 °F (38 2 °C)  Current: Temperature: 98 5 °F (36 9 °C)    Physical Exam:     Documented physical exam has been primarily done by the patient's nurse and/or the primary service due to limited examination abilities on telemedicine     General Appearance:  Alert, interactive, nontoxic, no acute distress  Throat: Oropharynx moist without lesions  Lungs:   Clear to auscultation bilaterally; no wheezes, rhonchi or rales; respirations unlabored   Heart:  RRR; no murmur, rub or gallop   Abdomen:   Soft, non-tender, non-distended, positive bowel sounds  Extremities: No clubbing, cyanosis or edema   Skin: Pacemaker, port and permacath site C/D/I  Left foot diffuse erythema, superficial wounds over L 2nd and 3rd toes, s/p partial 2nd toe amputation and first toe amputation       Labs: All pertinent labs and imaging studies were personally reviewed  Results from last 7 days   Lab Units 07/15/22  0556 07/14/22  0610 07/13/22  1354   WBC Thousand/uL 11 63* 12 62* 15 69*   HEMOGLOBIN g/dL 8 1* 8 0* 9 3*   PLATELETS Thousands/uL 252 240 228     Results from last 7 days   Lab Units 07/15/22  0556 07/14/22  0610 07/13/22  0444   SODIUM mmol/L 133* 131* 133*   POTASSIUM mmol/L 3 9 4 3 3 7   CHLORIDE mmol/L 98 96 97   CO2 mmol/L 27 28 27   BUN mg/dL 34* 21 40*   CREATININE mg/dL 3 05* 2 34* 3 13*   EGFR ml/min/1 73sq m 19 27 19   CALCIUM mg/dL 8 5 8 1* 8 5                       Micro:  Results from last 7 days   Lab Units 07/13/22  1354 07/09/22  0929 07/09/22  0542 07/08/22 2009   BLOOD CULTURE  No Growth at 24 hrs  No Growth After 5 Days  No Growth After 5 Days    --    MRSA CULTURE ONLY   --   --   --  No Methicillin Resistant Staphlyococcus aureus (MRSA) isolated       Imaging:  Imaging in PACS personally reviewed

## 2022-07-15 NOTE — PROGRESS NOTES
Progress Note - Podiatry  Tasneem Adair 74 y o  male MRN: [id number]  Unit/Bed#: ICU 11-01 Encounter: [id number]    Assessment:  1  Severe sepsis - POA  2  Left foot cellulitis with diabetic foot ulcer    3  Bacteriemia - likely source foot infection  4  Diabetic neuropathy 6 0% 2/2022     Plan:  - Plan for wound washout and possible 2nd toe amputation tomorrow with Dr Baptiste  - NPO at midnight   - continue local wound care for now  - continue IV abx as per ID recs,   - rest of the care as per primary service     Subjective/Objective   Chief Complaint:   Chief Complaint   Patient presents with    Fever - 9 weeks to 74 years       Subjective: 74 y o  y/o male was seen and evaluated at bedside  Patient denies any complaints  He states he will not have any foot amputation as he is not ready for it at the moment  NO there complaints  Blood pressure 141/65, pulse 63, temperature 99 2 °F (37 3 °C), temperature source Tympanic, resp  rate 16, height 5' 10" (1 778 m), weight 93 kg (205 lb 0 4 oz), SpO2 92 %  ,Body mass index is 29 42 kg/m²  Invasive Devices  Report    Peripheral Intravenous Line  Duration           Peripheral IV 07/14/22 Dorsal (posterior); Right Hand <1 day          Line  Duration           Hemodialysis AV Fistula 06/01/22 Left Forearm 43 days          Hemodialysis Catheter  Duration           HD Permanent Double Catheter 79 days                Physical Exam:   General: Alert, cooperative and no distress  Lungs: Non labored breathing  Heart: Positive S1, S2  Abdomen: Soft, non-tender  Extremity: Left plantar medial distal wound probable to periosteum the 2nd toe  Erythema present from the wound extending proximally to his midfoot now- appears to be regressing  NVS and MSK as baseline             Lab, Imaging and other studies:   CBC:   Lab Results   Component Value Date    WBC 12 62 (H) 07/14/2022    HGB 8 0 (L) 07/14/2022    HCT 25 9 (L) 07/14/2022     (H) 07/14/2022     07/14/2022 MCH 32 1 07/14/2022    MCHC 30 9 (L) 07/14/2022    RDW 17 5 (H) 07/14/2022    MPV 10 1 07/14/2022    NRBC 0 07/14/2022   , CMP:   Lab Results   Component Value Date    SODIUM 131 (L) 07/14/2022    K 4 3 07/14/2022    CL 96 07/14/2022    CO2 28 07/14/2022    BUN 21 07/14/2022    CREATININE 2 34 (H) 07/14/2022    CALCIUM 8 1 (L) 07/14/2022    EGFR 27 07/14/2022       Imaging: I have personally reviewed pertinent films in PACS  EKG, Pathology, and Other Studies: I have personally reviewed pertinent reports    VTE Pharmacologic Prophylaxis: Sequential compression device (Venodyne)

## 2022-07-15 NOTE — PROGRESS NOTES
Progress Note - Nephrology   Jose Leyva 74 y o  male MRN: [id number]  Unit/Bed#: ICU 11-01 Encounter: [id number]    A/P:  1  End-stage renal disease hemodialysis dependent Monday Wednesday Friday   - he will have a routine treatment today  - next treatment will be Monday 07/18/2022   - estimated dry weight is 90 kg   - no Epogen or Venofer   - check blood cultures from dialysis catheter    2  MSSA bacteremia   - receiving cefazolin   - will have an amputation of the 2nd toe of the left foot due to gangrenous changes this afternoon after dialysis by Podiatry    3  Diabetes mellitus type 2 with long-term current use of insulin   - continue to cover sugars with insulin    4  Persistent atrial fibrillation   - has good rate control and being followed by Cardiology    5  Secondary hyperparathyroidism of renal origin   - sevelamer discontinued due to low phosphorus   - encourage protein intake and take Glucerna with meals    6  Anemia chronic kidney disease   - has noted above no Epogen can be given due to the history of colon  Cancer  - transfuse for  Hemoglobin less than 7 5 - no IV iron can be given due infection/inflammation  Can utilize p o  iron if necessary, however, this is quite constipating for him   - after infection has resolved he will receive IV iron in the outpatient setting    7   Intra dialytic hypotension   - receiving midodrine prior to dialysis 5-10 mg       Follow up reason for today's visit: ESRD hemodialysis dep/MSSA bacteremia    Diabetic infection of left foot [facility])    Patient Active Problem List   Diagnosis    Bilateral leg edema    Diabetic ulcer of toe of left foot associated with type 2 diabetes mellitus ([address] )    Easy bruising    Eczema    Erectile dysfunction of non-organic origin    Hyperlipidemia    Uremic acidosis    Arthritis    Peripheral arterial disease (HCC)    PVCs (premature ventricular contractions)    Rhinitis    Systolic murmur    Vertigo    Complete heart block [address] [address]    Metabolic acidosis    ELZBIETA (acute kidney injury) ([facility] [address]    Other hyperlipidemia    Persistent atrial fibrillation (HCC)    Persistent proteinuria    Volume overload    Urinary retention    NICOLASA (obstructive sleep apnea)    Type 2 diabetes mellitus with chronic kidney disease, with long-term current use of insulin (HCC)    Hypertension    Stage 4 chronic kidney disease (HCC)    Aortic valve stenosis, nonrheumatic    Anemia    Fever    Mitral stenosis    Abnormal CT of the chest    Hyperkalemia    Acute pulmonary edema (HCC)    Chronic diastolic (congestive) heart failure (HCC)    Left renal artery stenosis (HCC)    S/P amputation of lesser toe, left (HCC)    S/P amputation of lesser toe, right (HCC)    Elevated troponin I level    Staphylococcus aureus bacteremia    Type 2 diabetes mellitus with diabetic neuropathy, without long-term current use of insulin (HCC)    Hyponatremia    Iron deficiency anemia    Anxiety    Osteomyelitis of left foot (HCC)    Amputation of left great toe (HCC)    Multiple drug resistant organism (MDRO) culture positive    Renovascular hypertension    SSS (sick sinus syndrome) (HCC)    Chronic obstructive pulmonary disease (HCC)    Absence of toe (HCC)    Chronic painful diabetic neuropathy (HCC)    Onychomycosis    Colon cancer (HCC)    Acute kidney injury superimposed on chronic kidney disease (HCC)    RSV (respiratory syncytial virus pneumonia)    Chemotherapy-induced neutropenia (HCC)    Chemotherapy-induced nausea    Acute on chronic diastolic congestive heart failure (HCC)    Other iron deficiency anemias    Pulmonary hypertension (HCC)    S/P arteriovenous (AV) fistula creation    Severe sepsis with lactic acidosis (HCC)    Diabetic infection of left foot (HCC)    Gram-positive bacteremia    Acute respiratory failure with hypoxia (HCC)    ESRD (end stage renal disease) (HCC)         Subjective:   A 10 point ROS is negative  His foot is numb    Objective:     Vitals: Blood pressure 150/69, pulse 64, temperature 98 9 °F (37 2 °C), temperature source Tympanic, resp  rate 18, height 5' 10" (1 778 m), weight 88 9 kg (195 lb 15 8 oz), SpO2 93 %  ,Body mass index is 28 12 kg/m²  Weight (last 2 days)     Date/Time Weight    07/15/22 0615 88 9 (195 99)            Intake/Output Summary (Last 24 hours) at 7/15/2022 1148  Last data filed at 7/15/2022 0800  Gross per 24 hour   Intake 640 ml   Output --   Net 640 ml     I/O last 3 completed shifts: In: 1300 [P O :930; I V :300; IV Piggyback:70]  Out: 9207 [Other:3502]    HD Permanent Double Catheter (Active)   Reasons to continue HD Cath Treatment Therapy 07/14/22 2000   Goal for Removal N/A- chronic HD catheter 07/14/22 2000   Line Necessity Reviewed Yes, reviewed with provider 07/13/22 2000   Site Assessment WDL 07/14/22 2000   Proximal Lumen Status Capped 07/14/22 2000   Distal Lumen Status Capped 07/14/22 2000   Dressing Type Chlorhexidine dressing 07/14/22 2000   Dressing Status Clean;Dry; Intact 07/14/22 2000   Dressing Intervention 7 day central line dressing changed 07/14/22 2000   Dressing Change Due 07/20/22 07/14/22 2000       Physical Exam: /69 (BP Location: Right arm)   Pulse 64   Temp 98 9 °F (37 2 °C) (Tympanic)   Resp 18   Ht 5' 10" (1 778 m)   Wt 88 9 kg (195 lb 15 8 oz)   SpO2 93%   BMI 28 12 kg/m²     General Appearance:    Alert, cooperative, no distress, appears stated age   Head:    Normocephalic, without obvious abnormality, atraumatic   Eyes:    Conjunctiva/corneas clear   Ears:    Normal external ears   Nose:   Nares normal, septum midline, mucosa normal, no drainage    or sinus tenderness   Throat:   Lips, mucosa, and tongue normal; teeth and gums normal   Neck:   Supple, symmetrical, trachea midline, no adenopathy;        thyroid:  No enlargement/tenderness/nodules; no carotid    bruit or JVD   Back:     Symmetric, no curvature, ROM normal, no CVA tenderness   Lungs:     Clear to auscultation bilaterally, respirations unlabored   Chest wall:    No tenderness or deformity   Heart:     Irregular rate and rhythm, S1 and S2 normal, no murmur, rub   or gallop   Abdomen:     Soft, non-tender, bowel sounds active   Extremities:   Extremities left foot wrapped  Has dry callus on right first toe no edema   Skin:   Skin color, texture, turgor normal, no rashes or lesions   Lymph nodes:   Cervical normal   Neurologic:   CNII-XII intact            Lab, Imaging and other studies: I have personally reviewed pertinent labs  CBC:   Lab Results   Component Value Date    WBC 11 63 (H) 07/15/2022    HGB 8 1 (L) 07/15/2022    HCT 25 3 (L) 07/15/2022     (H) 07/15/2022     07/15/2022    MCH 33 1 07/15/2022    MCHC 32 0 07/15/2022    RDW 17 1 (H) 07/15/2022    MPV 9 9 07/15/2022    NRBC 0 07/15/2022     CMP:   Lab Results   Component Value Date    K 3 9 07/15/2022    CL 98 07/15/2022    CO2 27 07/15/2022    BUN 34 (H) 07/15/2022    CREATININE 3 05 (H) 07/15/2022    CALCIUM 8 5 07/15/2022    EGFR 19 07/15/2022           Results from last 7 days   Lab Units 07/15/22  0556 07/14/22  0610 07/13/22  0444   POTASSIUM mmol/L 3 9 4 3 3 7   CHLORIDE mmol/L 98 96 97   CO2 mmol/L 27 28 27   BUN mg/dL 34* 21 40*   CREATININE mg/dL 3 05* 2 34* 3 13*   CALCIUM mg/dL 8 5 8 1* 8 5         Phosphorus:   Lab Results   Component Value Date    PHOS 2 8 07/15/2022     Magnesium:   Lab Results   Component Value Date    MG 2 0 07/15/2022     Urinalysis: No results found for: COLORU, CLARITYU, SPECGRAV, PHUR, LEUKOCYTESUR, NITRITE, PROTEINUA, GLUCOSEU, KETONESU, BILIRUBINUR, BLOODU  Ionized Calcium: No results found for: CAION  Coagulation: No results found for: PT, INR, APTT  Troponin: No results found for: TROPONINI  ABG: No results found for: PHART, XMO4SFB, PO2ART, FQA6XBJ, C6CVZFWH, BEART, SOURCE  Radiology review:     IMAGING  Procedure: VAS lower limb arterial duplex, complete bilateral    Result Date: 7/13/2022  Narrative:  [facility] REPORT CLINICAL: Operative History: 2020-01-12 Left I and D with removal of sesmoid bone 2020-01-08 Left great toe amputation Pacemaker partial B/L hallux amputation Risk Factors The patient has history of HTN, Diabetes, Hyperlipidemia, CKD, PAD and previous smoking (quit 5-10yrs ago)  FINDINGS:  Segment                Right                   Left                                          Impression  PSV (cm/s)  PSV (cm/s)  Common Femoral Artery  50-75%             260         146  Prox Profunda                              71         117  Prox SFA                                  146         161  Mid SFA                                   153         148  Dist SFA                                  112         141  Proximal Pop                               80         153  Distal Pop                                 80         120  Dist Post Tibial                          134         135  Prox  Ant  Tibial                          46              Dist  Ant  Tibial                          47          27     CONCLUSION: Impression: RIGHT LOWER LIMB: There is a 50-75% stenosis in the common femoral artery  Ankle/Brachial index: Unreliable secondary to poorly compressible vessels  (Prior 0 96) PVR/ PPG tracings are dampened  Metatarsal pressure 164 mmHg  Great toe pressure of 59 mmHg, within the diabetic healing range  LEFT LOWER LIMB: Diffuse disease noted throughout the femoral-popliteal arteries without significant focal stenosis  Ankle/Brachial index: Unreliable secondary to poorly compressible vessels  (Prior: same) PVR tracings are normal  Metatarsal pressure of 82 mmHg Great toe has been amputated  Compared to previous study on 1/13/2020, there is now a stenosis of the R CFA, the R EM is now unreliable    SIGNATURE: Electronically Signed by: Kaelyn Dale MD on 2022-07-13 04:33:21 PM    Procedure: NM ceretec abscess localization limited    Result Date: 7/13/2022  Narrative: CERETEC WHITE BLOOD CELL STUDY INDICATION:Left foot ceretec bone scan  Suspect 1st met and 2nd metatarsal osteomyelitis with underling abscess  COMPARISON:   Left foot radiographs 7/7/2022 and priors TECHNIQUE: The study was performed following the intravenous administration of 27 9 mCi Tc-99m labeled Ceretec white blood cells  Static images of the feet were acquired in multiple projections  Imaging was obtained 3 hours and 22 hours post injection  FINDINGS: There is persistent radiotracer activity on the 3 hour and 24-hour images in the distal left foot  Activity is amorphous, but appears to be centered in the region of the left 2nd proximal phalanx amputation site and surrounding soft tissue  Findings correspond with the history of cellulitis /abscess  Osseous involvement is difficult to exclude due to the amorphous appearance of the radiotracer, even on the delayed 22 hour images  Impression: 1  Persistent radiotracer activity centered in the region of the left 2nd proximal phalanx amputation site and surrounding soft tissues, corresponding with the history of cellulitis/abscess  Osseous involvement is difficult to exclude due to the amorphous appearance of the radiotracer  Workstation performed: [id number]     Procedure: IR port Removal    Result Date: 7/12/2022  Narrative: Right chest port removal Clinical History: Colon cancer  Multiple active medical issues including end-stage renal disease on dialysis, heart disease with pacemaker  Foot infection  Now with Staphylococcus aureus bacteremia Multidisciplinary discussion regarding intravascular device management, with plan at this time to remove the port which will no longer be used  Fluoro time: 0 5 minutes Conscious sedation time: 30 minutes Technique: The patient was brought to the interventional radiology suite and identified verbally and by wristband  Risks benefits alternatives were discussed    The patient was placed supine on the table, and the existing port site was prepped and draped in the usual sterile fashion  All elements of maximal sterile barrier technique were followed (cap, mask, sterile gown, sterile gloves, large sterile sheet, hand hygiene, and 2% chlorhexidine for cutaneous antisepsis)  Timeout was performed  A preprocedure fluoroscopic image was obtained  Lidocaine was administered to the skin of the old incision site and port pocket  The incision was opened, and the port hub was dissected free  Once control of the hub was obtained, the port was removed in its entirety under live fluoroscopic guidance   The tip was sent for culture  Hemostasis was achieved  Removal was confirmed fluoroscopically  The incision was closed using deep and interrupted Vicryl sutures  Medical glue was then applied to the incision site  A sterile dressing was applied  The patient tolerated the procedure well and there were no immediate complications  Findings: Preprocedure imaging with right tunneled dialysis catheter, right chest port, pacemaker  Final imaging with poor removed and unchanged position of tunneled catheter     Impression: Fluoroscopically guided removal of right chest port The tunnel dialysis catheter will be managed based on upcoming ELBERT, blood cultures  It may be appropriate to consider over-the-wire exchange in this patient with limited access options   Workstation performed: [id number]       Current Facility-Administered Medications   Medication Dose Route Frequency    acetaminophen (TYLENOL) tablet 650 mg  650 mg Oral Q6H PRN    amLODIPine (NORVASC) tablet 10 mg  10 mg Oral Daily    ceFAZolin (ANCEF) IVPB (premix in dextrose) 1,000 mg 50 mL  1,000 mg Intravenous Q24H    insulin glargine (LANTUS) subcutaneous injection 10 Units 0 1 mL  10 Units Subcutaneous HS    insulin lispro (HumaLOG) 100 units/mL subcutaneous injection 1-6 Units  1-6 Units Subcutaneous TID AC    insulin lispro (HumaLOG) 100 units/mL subcutaneous injection 1-6 Units  1-6 Units Subcutaneous HS    loperamide (IMODIUM) capsule 2 mg  2 mg Oral Q4H PRN    midodrine (PROAMATINE) tablet 5 mg  5 mg Oral Once per day on Mon Wed Fri    polyethylene glycol (MIRALAX) packet 17 g  17 g Oral Daily PRN    senna-docusate sodium (SENOKOT S) 8 6-50 mg per tablet 1 tablet  1 tablet Oral HS    tamsulosin (FLOMAX) capsule 0 4 mg  0 4 mg Oral Daily With Dinner    torsemide [facility]) tablet 80 mg  80 mg Oral Once per day on Sun Tue Thu Sat     Medications Discontinued During This Encounter   Medication Reason    vancomycin (VANCOCIN) 1,250 mg in sodium chloride 0 9 % 250 mL IVPB     allopurinol (ZYLOPRIM) tablet 300 mg     cefepime (MAXIPIME) IVPB (premix in dextrose) 1,000 mg 50 mL     sodium bicarbonate tablet 650 mg     vancomycin (VANCOCIN) IVPB (premix in dextrose) 1,000 mg 200 mL     apixaban (ELIQUIS) tablet 5 mg     metoprolol tartrate (LOPRESSOR) tablet 50 mg     vancomycin (VANCOCIN) IVPB (premix in dextrose) 1,000 mg 200 mL     vancomycin (VANCOCIN) 1500 mg in sodium chloride 0 9% 250 mL IVPB     sevelamer (RENAGEL) tablet 800 mg     apixaban (ELIQUIS) tablet 5 mg        Tricia Ocampo MD      This progress note was produced in part using a dictation device which may document imprecise wording from author's original intent

## 2022-07-16 ENCOUNTER — APPOINTMENT (INPATIENT)
Dept: DIALYSIS | Facility: HOSPITAL | Age: 67
DRG: 853 | End: 2022-07-16
Payer: MEDICARE

## 2022-07-16 ENCOUNTER — APPOINTMENT (INPATIENT)
Dept: RADIOLOGY | Facility: HOSPITAL | Age: 67
DRG: 853 | End: 2022-07-16
Payer: MEDICARE

## 2022-07-16 LAB
ANION GAP SERPL CALCULATED.3IONS-SCNC: 10 MMOL/L (ref 4–13)
BASOPHILS # BLD AUTO: 0.06 THOUSANDS/ΜL (ref 0–0.1)
BASOPHILS NFR BLD AUTO: 1 % (ref 0–1)
BUN SERPL-MCNC: 27 MG/DL (ref 5–25)
CALCIUM SERPL-MCNC: 8.6 MG/DL (ref 8.4–10.2)
CHLORIDE SERPL-SCNC: 97 MMOL/L (ref 96–108)
CO2 SERPL-SCNC: 27 MMOL/L (ref 21–32)
CREAT SERPL-MCNC: 2.84 MG/DL (ref 0.6–1.3)
EOSINOPHIL # BLD AUTO: 0.27 THOUSAND/ΜL (ref 0–0.61)
EOSINOPHIL NFR BLD AUTO: 2 % (ref 0–6)
ERYTHROCYTE [DISTWIDTH] IN BLOOD BY AUTOMATED COUNT: 17.2 % (ref 11.6–15.1)
GFR SERPL CREATININE-BSD FRML MDRD: 21 ML/MIN/1.73SQ M
GLUCOSE SERPL-MCNC: 109 MG/DL (ref 65–140)
GLUCOSE SERPL-MCNC: 111 MG/DL (ref 65–140)
GLUCOSE SERPL-MCNC: 127 MG/DL (ref 65–140)
GLUCOSE SERPL-MCNC: 192 MG/DL (ref 65–140)
GLUCOSE SERPL-MCNC: 204 MG/DL (ref 65–140)
HCT VFR BLD AUTO: 28.8 % (ref 36.5–49.3)
HGB BLD-MCNC: 8.9 G/DL (ref 12–17)
IMM GRANULOCYTES # BLD AUTO: 0.17 THOUSAND/UL (ref 0–0.2)
IMM GRANULOCYTES NFR BLD AUTO: 1 % (ref 0–2)
LYMPHOCYTES # BLD AUTO: 1.14 THOUSANDS/ΜL (ref 0.6–4.47)
LYMPHOCYTES NFR BLD AUTO: 9 % (ref 14–44)
MAGNESIUM SERPL-MCNC: 2 MG/DL (ref 1.9–2.7)
MCH RBC QN AUTO: 32 PG (ref 26.8–34.3)
MCHC RBC AUTO-ENTMCNC: 30.9 G/DL (ref 31.4–37.4)
MCV RBC AUTO: 104 FL (ref 82–98)
MONOCYTES # BLD AUTO: 1.29 THOUSAND/ΜL (ref 0.17–1.22)
MONOCYTES NFR BLD AUTO: 10 % (ref 4–12)
NEUTROPHILS # BLD AUTO: 10.04 THOUSANDS/ΜL (ref 1.85–7.62)
NEUTS SEG NFR BLD AUTO: 77 % (ref 43–75)
NRBC BLD AUTO-RTO: 0 /100 WBCS
PHOSPHATE SERPL-MCNC: 3.7 MG/DL (ref 2.3–4.1)
PLATELET # BLD AUTO: 319 THOUSANDS/UL (ref 149–390)
PMV BLD AUTO: 9.9 FL (ref 8.9–12.7)
POTASSIUM SERPL-SCNC: 4.3 MMOL/L (ref 3.5–5.3)
RBC # BLD AUTO: 2.78 MILLION/UL (ref 3.88–5.62)
SODIUM SERPL-SCNC: 134 MMOL/L (ref 135–147)
WBC # BLD AUTO: 12.97 THOUSAND/UL (ref 4.31–10.16)

## 2022-07-16 PROCEDURE — 88311 DECALCIFY TISSUE: CPT | Performed by: SPECIALIST

## 2022-07-16 PROCEDURE — 88307 TISSUE EXAM BY PATHOLOGIST: CPT | Performed by: SPECIALIST

## 2022-07-16 PROCEDURE — 82948 REAGENT STRIP/BLOOD GLUCOSE: CPT

## 2022-07-16 PROCEDURE — 0Y6S0Z1 DETACHMENT AT LEFT 2ND TOE, HIGH, OPEN APPROACH: ICD-10-PCS | Performed by: PODIATRIST

## 2022-07-16 PROCEDURE — 80048 BASIC METABOLIC PNL TOTAL CA: CPT | Performed by: INTERNAL MEDICINE

## 2022-07-16 PROCEDURE — 87070 CULTURE OTHR SPECIMN AEROBIC: CPT | Performed by: PODIATRIST

## 2022-07-16 PROCEDURE — 83735 ASSAY OF MAGNESIUM: CPT | Performed by: INTERNAL MEDICINE

## 2022-07-16 PROCEDURE — 84100 ASSAY OF PHOSPHORUS: CPT | Performed by: INTERNAL MEDICINE

## 2022-07-16 PROCEDURE — 87075 CULTR BACTERIA EXCEPT BLOOD: CPT | Performed by: PODIATRIST

## 2022-07-16 PROCEDURE — 73630 X-RAY EXAM OF FOOT: CPT

## 2022-07-16 PROCEDURE — 99232 SBSQ HOSP IP/OBS MODERATE 35: CPT | Performed by: INTERNAL MEDICINE

## 2022-07-16 PROCEDURE — 85025 COMPLETE CBC W/AUTO DIFF WBC: CPT | Performed by: INTERNAL MEDICINE

## 2022-07-16 PROCEDURE — 28825 PARTIAL AMPUTATION OF TOE: CPT | Performed by: PODIATRIST

## 2022-07-16 PROCEDURE — 87205 SMEAR GRAM STAIN: CPT | Performed by: PODIATRIST

## 2022-07-16 PROCEDURE — 90935 HEMODIALYSIS ONE EVALUATION: CPT | Performed by: INTERNAL MEDICINE

## 2022-07-16 RX ORDER — FENTANYL CITRATE 50 UG/ML
INJECTION, SOLUTION INTRAMUSCULAR; INTRAVENOUS AS NEEDED
Status: DISCONTINUED | OUTPATIENT
Start: 2022-07-16 | End: 2022-07-16

## 2022-07-16 RX ORDER — SODIUM CHLORIDE 9 MG/ML
INJECTION, SOLUTION INTRAVENOUS CONTINUOUS PRN
Status: DISCONTINUED | OUTPATIENT
Start: 2022-07-16 | End: 2022-07-16

## 2022-07-16 RX ORDER — ONDANSETRON 2 MG/ML
4 INJECTION INTRAMUSCULAR; INTRAVENOUS ONCE AS NEEDED
Status: CANCELLED | OUTPATIENT
Start: 2022-07-16

## 2022-07-16 RX ORDER — PROPOFOL 10 MG/ML
INJECTION, EMULSION INTRAVENOUS CONTINUOUS PRN
Status: DISCONTINUED | OUTPATIENT
Start: 2022-07-16 | End: 2022-07-16

## 2022-07-16 RX ORDER — CEFAZOLIN SODIUM 1 G/50ML
SOLUTION INTRAVENOUS
Qty: 1 EACH | Refills: 0 | Status: SHIPPED | OUTPATIENT
Start: 2022-07-16 | End: 2022-08-19

## 2022-07-16 RX ORDER — PROPOFOL 10 MG/ML
INJECTION, EMULSION INTRAVENOUS AS NEEDED
Status: DISCONTINUED | OUTPATIENT
Start: 2022-07-16 | End: 2022-07-16

## 2022-07-16 RX ORDER — LIDOCAINE HYDROCHLORIDE 20 MG/ML
INJECTION, SOLUTION EPIDURAL; INFILTRATION; INTRACAUDAL; PERINEURAL AS NEEDED
Status: DISCONTINUED | OUTPATIENT
Start: 2022-07-16 | End: 2022-07-16

## 2022-07-16 RX ORDER — FENTANYL CITRATE/PF 50 MCG/ML
25 SYRINGE (ML) INJECTION
Status: CANCELLED | OUTPATIENT
Start: 2022-07-16

## 2022-07-16 RX ADMIN — APIXABAN 5 MG: 5 TABLET, FILM COATED ORAL at 10:50

## 2022-07-16 RX ADMIN — AMLODIPINE BESYLATE 10 MG: 10 TABLET ORAL at 21:48

## 2022-07-16 RX ADMIN — PROPOFOL 100 MCG/KG/MIN: 10 INJECTION, EMULSION INTRAVENOUS at 07:30

## 2022-07-16 RX ADMIN — INSULIN LISPRO 2 UNITS: 100 INJECTION, SOLUTION INTRAVENOUS; SUBCUTANEOUS at 21:48

## 2022-07-16 RX ADMIN — SODIUM CHLORIDE: 9 INJECTION, SOLUTION INTRAVENOUS at 07:19

## 2022-07-16 RX ADMIN — INSULIN GLARGINE 10 UNITS: 100 INJECTION, SOLUTION SUBCUTANEOUS at 21:48

## 2022-07-16 RX ADMIN — CEFAZOLIN SODIUM 1000 MG: 1 SOLUTION INTRAVENOUS at 18:19

## 2022-07-16 RX ADMIN — TAMSULOSIN HYDROCHLORIDE 0.4 MG: 0.4 CAPSULE ORAL at 18:19

## 2022-07-16 RX ADMIN — LIDOCAINE HYDROCHLORIDE 50 MG: 20 INJECTION, SOLUTION EPIDURAL; INFILTRATION; INTRACAUDAL; PERINEURAL at 07:30

## 2022-07-16 RX ADMIN — INSULIN LISPRO 2 UNITS: 100 INJECTION, SOLUTION INTRAVENOUS; SUBCUTANEOUS at 16:57

## 2022-07-16 RX ADMIN — FENTANYL CITRATE 50 MCG: 50 INJECTION, SOLUTION INTRAMUSCULAR; INTRAVENOUS at 07:28

## 2022-07-16 RX ADMIN — PROPOFOL 30 MG: 10 INJECTION, EMULSION INTRAVENOUS at 07:30

## 2022-07-16 NOTE — PROGRESS NOTES
Progress Note - Nephrology   Jessica Scott 74 y o  male MRN: [id number]  Unit/Bed#: ICU 11-01 Encounter: [id number]    A/P:  1  End-stage renal disease hemodialysis dependent   - HEMODIALYSIS PROCEDURE NOTE  The patient was seen and examined on hemodialysis  Time: 2 hours  Sodium: 138 Blood flow: 400   Dialyzer: F160 Potassium: 3 Dialysate flow: 600   Access: right Permcath Bicarbonate: 35 Ultrafiltration goal: 2   Medications on HD: none   Blood pressure is 144/64   - Patient tolerating treatment well   - he had 2 hours of dialysis yesterday de to planned surgical procedure    2  MSSA bacteremia   - will have IV antibiotics at the clinic   - had an amputation of the 2nd toe of the left foot due to gangrenous changes this morning(schedule changed from yesterday afternoon) by Podiatry    3  Type  2 diabetes mellitus with long-term current use of insulin   - continue to cover sugars with insulin    4  Persistent atrial fibrillation   - has good rate control    5  Secondary hyperparathyroidism of renal origin   - Sevelamer held due to hypophosphatemia   - will follow labs in outpatient dialysis    6  Anemia of chronic kidney disease   - Hb 8 9   - no indication for transfusion   - hold Epogen due to colon cancer   - no iron IV while on IV antibiotics    Follow up reason for today's visit: ESRD     Diabetic infection of left foot [facility])    Patient Active Problem List   Diagnosis    Bilateral leg edema    Diabetic ulcer of toe of left foot associated with type 2 diabetes mellitus ([address] )    Easy bruising    Eczema    Erectile dysfunction of non-organic origin    Hyperlipidemia    Uremic acidosis    Arthritis    Peripheral arterial disease (HCC)    PVCs (premature ventricular contractions)    Rhinitis    Systolic murmur    Vertigo    Complete heart block (HCC)    Metabolic acidosis    ELZBIETA (acute kidney injury) ([address] )    Other hyperlipidemia    Persistent atrial fibrillation (HCC)    Persistent proteinuria  Volume overload    Urinary retention    NICOLASA (obstructive sleep apnea)    Type 2 diabetes mellitus with chronic kidney disease, with long-term current use of insulin (HCC)    Hypertension    Stage 4 chronic kidney disease (HCC)    Aortic valve stenosis, nonrheumatic    Anemia    Fever    Mitral stenosis    Abnormal CT of the chest    Hyperkalemia    Acute pulmonary edema (HCC)    Chronic diastolic (congestive) heart failure (HCC)    Left renal artery stenosis (HCC)    S/P amputation of lesser toe, left (HCC)    S/P amputation of lesser toe, right (HCC)    Elevated troponin I level    Staphylococcus aureus bacteremia    Type 2 diabetes mellitus with diabetic neuropathy, without long-term current use of insulin (HCC)    Hyponatremia    Iron deficiency anemia    Anxiety    Osteomyelitis of left foot (HCC)    Amputation of left great toe (HCC)    Multiple drug resistant organism (MDRO) culture positive    Renovascular hypertension    SSS (sick sinus syndrome) (HCC)    Chronic obstructive pulmonary disease (HCC)    Absence of toe (HCC)    Chronic painful diabetic neuropathy (HCC)    Onychomycosis    Colon cancer (HCC)    Acute kidney injury superimposed on chronic kidney disease (HCC)    RSV (respiratory syncytial virus pneumonia)    Chemotherapy-induced neutropenia (HCC)    Chemotherapy-induced nausea    Acute on chronic diastolic congestive heart failure (HCC)    Other iron deficiency anemias    Pulmonary hypertension (HCC)    S/P arteriovenous (AV) fistula creation    Severe sepsis with lactic acidosis (HCC)    Diabetic infection of left foot (HCC)    Gram-positive bacteremia    Acute respiratory failure with hypoxia (HCC)    ESRD (end stage renal disease) (HCC)         Subjective:   A 10 point ROS is negative   He will be discharged this evening    Objective:     Vitals: Blood pressure 130/62, pulse 60, temperature (!) 97 3 °F (36 3 °C), temperature source Temporal, resp  rate 20, height 5' 10" (1 778 m), weight 88 2 kg (194 lb 7 1 oz), SpO2 97 %  ,Body mass index is 27 9 kg/m²  Weight (last 2 days)     Date/Time Weight    07/16/22 0500 88 2 (194 45)    07/15/22 0615 88 9 (195 99)            Intake/Output Summary (Last 24 hours) at 7/16/2022 1625  Last data filed at 7/16/2022 1600  Gross per 24 hour   Intake 430 ml   Output 0 ml   Net 430 ml     I/O last 3 completed shifts: In: 800 [P O :250; I V :500; IV Piggyback:50]  Out: 1100 [Other:1100]    HD Permanent Double Catheter (Active)   Reasons to continue HD Cath Treatment Therapy 07/16/22 1600   Goal for Removal N/A- chronic HD catheter 07/16/22 1600   Line Necessity Reviewed Yes, reviewed with provider 07/16/22 1600   Site Assessment WDL 07/16/22 1600   Proximal Lumen Status Blood return noted 07/16/22 1600   Distal Lumen Status Blood return noted 07/16/22 1600   Dressing Type Chlorhexidine dressing 07/16/22 1600   Dressing Status Dry; Intact 07/16/22 1600   Dressing Intervention 7 day central line dressing changed 07/15/22 1500   Dressing Change Due 07/20/22 07/16/22 1600       Physical Exam: /62 (BP Location: Right arm)   Pulse 60   Temp (!) 97 3 °F (36 3 °C) (Temporal)   Resp 20   Ht 5' 10" (1 778 m)   Wt 88 2 kg (194 lb 7 1 oz)   SpO2 97%   BMI 27 90 kg/m²     General Appearance:    Alert, cooperative, no distress, appears stated age   Head:    Normocephalic, without obvious abnormality, atraumatic   Eyes:    Conjunctiva/corneas clear   Ears:    Normal external ears   Nose:   Nares normal, septum midline, mucosa normal, no drainage    or sinus tenderness   Throat:   Lips, mucosa, and tongue normal; teeth and gums normal   Neck:   Supple, symmetrical, trachea midline, no adenopathy;        thyroid:  No enlargement/tenderness/nodules; no carotid    bruit or JVD   Back:     Symmetric, no curvature, ROM normal, no CVA tenderness   Lungs:     Clear to auscultation bilaterally, respirations unlabored   Chest wall: No tenderness or deformity Perm Cath    Heart:    Irregular rate and rhythm, S1 and S2 normal, no murmur, rub   or gallop   Abdomen:     Soft, non-tender, bowel sounds active   Extremities:   Extremities left foot wrapped, no cyanosis or edema   Skin:   Skin color, texture, turgor normal, no rashes or lesions   Lymph nodes:   Cervical normal   Neurologic:   CNII-XII intact            Lab, Imaging and other studies: I have personally reviewed pertinent labs  CBC:   Lab Results   Component Value Date    WBC 12 97 (H) 07/16/2022    HGB 8 9 (L) 07/16/2022    HCT 28 8 (L) 07/16/2022     (H) 07/16/2022     07/16/2022    MCH 32 0 07/16/2022    MCHC 30 9 (L) 07/16/2022    RDW 17 2 (H) 07/16/2022    MPV 9 9 07/16/2022    NRBC 0 07/16/2022     CMP:   Lab Results   Component Value Date    K 4 3 07/16/2022    CL 97 07/16/2022    CO2 27 07/16/2022    BUN 27 (H) 07/16/2022    CREATININE 2 84 (H) 07/16/2022    CALCIUM 8 6 07/16/2022    EGFR 21 07/16/2022         Results from last 7 days   Lab Units 07/16/22  0521 07/15/22  0556 07/14/22  0610   POTASSIUM mmol/L 4 3 3 9 4 3   CHLORIDE mmol/L 97 98 96   CO2 mmol/L 27 27 28   BUN mg/dL 27* 34* 21   CREATININE mg/dL 2 84* 3 05* 2 34*   CALCIUM mg/dL 8 6 8 5 8 1*         Phosphorus:   Lab Results   Component Value Date    PHOS 3 7 07/16/2022     Magnesium:   Lab Results   Component Value Date    MG 2 0 07/16/2022     Urinalysis: No results found for: COLORU, CLARITYU, SPECGRAV, PHUR, LEUKOCYTESUR, NITRITE, PROTEINUA, GLUCOSEU, KETONESU, BILIRUBINUR, BLOODU  Ionized Calcium: No results found for: CAION  Coagulation: No results found for: PT, INR, APTT  Troponin: No results found for: TROPONINI  ABG: No results found for: PHART, DLX0PXV, PO2ART, GGA0KWS, R4FFJJSL, BEART, SOURCE  Radiology review:     IMAGING  No results found      Current Facility-Administered Medications   Medication Dose Route Frequency    acetaminophen (TYLENOL) tablet 650 mg  650 mg Oral Q6H PRN  amLODIPine (NORVASC) tablet 10 mg  10 mg Oral Daily    ceFAZolin (ANCEF) IVPB (premix in dextrose) 1,000 mg 50 mL  1,000 mg Intravenous Q24H    insulin glargine (LANTUS) subcutaneous injection 10 Units 0 1 mL  10 Units Subcutaneous HS    insulin lispro (HumaLOG) 100 units/mL subcutaneous injection 1-6 Units  1-6 Units Subcutaneous TID AC    insulin lispro (HumaLOG) 100 units/mL subcutaneous injection 1-6 Units  1-6 Units Subcutaneous HS    loperamide (IMODIUM) capsule 2 mg  2 mg Oral Q4H PRN    midodrine (PROAMATINE) tablet 5 mg  5 mg Oral Once per day on Mon Wed Fri    polyethylene glycol (MIRALAX) packet 17 g  17 g Oral Daily PRN    senna-docusate sodium (SENOKOT S) 8 6-50 mg per tablet 1 tablet  1 tablet Oral HS    tamsulosin (FLOMAX) capsule 0 4 mg  0 4 mg Oral Daily With Dinner    torsemide [facility]) tablet 80 mg  80 mg Oral Once per day on Sun Tue Thu Sat     Medications Discontinued During This Encounter   Medication Reason    vancomycin (VANCOCIN) 1,250 mg in sodium chloride 0 9 % 250 mL IVPB     allopurinol (ZYLOPRIM) tablet 300 mg     cefepime (MAXIPIME) IVPB (premix in dextrose) 1,000 mg 50 mL     sodium bicarbonate tablet 650 mg     vancomycin (VANCOCIN) IVPB (premix in dextrose) 1,000 mg 200 mL     apixaban (ELIQUIS) tablet 5 mg     metoprolol tartrate (LOPRESSOR) tablet 50 mg     vancomycin (VANCOCIN) IVPB (premix in dextrose) 1,000 mg 200 mL     vancomycin (VANCOCIN) 1500 mg in sodium chloride 0 9% 250 mL IVPB     sevelamer (RENAGEL) tablet 800 mg     apixaban (ELIQUIS) tablet 5 mg     apixaban (ELIQUIS) tablet 5 mg        Faviola Barros MD      This progress note was produced in part using a dictation device which may document imprecise wording from author's original intent

## 2022-07-16 NOTE — ANESTHESIA POSTPROCEDURE EVALUATION
Post-Op Assessment Note    CV Status:  Stable  Pain Score: 0    Pain management: adequate     Mental Status:  Arousable   Hydration Status:  Stable   PONV Controlled:  None   Airway Patency:  Patent      Post Op Vitals Reviewed: Yes      Staff: CRNA         No complications documented      BP   111/54   Temp      Pulse  60   Resp   16   SpO2   99

## 2022-07-16 NOTE — PROGRESS NOTES
[address]  Progress Note Edilberto Han 1955, 74 y o  male MRN: [id number]  Unit/Bed#: ICU 11-01 Encounter: [id number]  Primary Care Provider: Courtney Mcintosh DO   Date and time admitted to hospital: 7/7/2022 12:44 PM    * Diabetic infection of left foot [facility])  Assessment & Plan  · Status post incision and drainage at the bedside with Podiatry  · Continue cefazolin as per ID  · Pharmacy consult for vancomycin trough management  · Podiatry consulted ; appreciate recommendations  · Follow-up bone scan  · Planned for amputation of 2nd left toe today with Podiatry  · Infectious Diseases following    Acute respiratory failure with hypoxia ([address] )  Assessment & Plan  · Requiring 2 L of nasal cannula supplemental O2 this morning  · Possibly secondary to volume overload  · Wean O2 as tolerated  · Nephrology following ; appreciate recommendations regarding possible diuresis  · Will remove volume during HD treatments  · Goal SpO2 > 90%    Severe sepsis with lactic acidosis ([address] )  Assessment & Plan  · Resolved with IV fluid resuscitation and broad-spectrum IV antibiotics  · Secondary to diabetic foot infection present on admission with fever and leukocytosis  · Lactic acidosis resolved  · Will follow-up bone scan of left foot  · Continue IV antibiotics with cefazolin  · Infectious Diseases following  · Trend procalcitonin  · Blood cultures positive for gram-positive cocci ; repeat blood cultures with no growth    Gram-positive bacteremia  Assessment & Plan  · Source likely left diabetic toe ulcer  · Patient has cleared bacteremia  · TTE with no signs of agitation  · After multi disciplinary discussion it was decided that patient is too high risk for ppm removal and will instead undergo 6 weeks of IV antibiotics in the setting of Gram-positive bacteremia with hardware in place  · Likely MSSA  · Repeat blood cultures with no growth to date  · Continue cefazolin as per ID for a 6 week course through 8/19/2022 ; 2g/2g/3g dosed after HD MWF  · Follow-up repeat blood cultures ; no growth at 48 hours  · No need for ELBERT or ppm removal at this time as per ID ; will need extended course of IV antibiotics  · Chemo port has been removed by IR as patient will no longer be undergoing chemotherapy    ESRD (end stage renal disease) [facility])  Assessment & Plan  Lab Results   Component Value Date    EGFR 22 07/12/2022    EGFR 15 07/11/2022    EGFR 14 07/10/2022    CREATININE 2 77 (H) 07/12/2022    CREATININE 3 85 (H) 07/11/2022    CREATININE 4 06 (H) 07/10/2022   · ESRD on HD Monday/Wednesday/Friday  · Nephrology following ; appreciate recommendations  · 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, sevelamer    Colon cancer [facility])  Assessment & Plan  · Patient follows with Hematology/Oncology  · Last chemo session was on 07/05/2022  · Chemo-port will be removed in the setting of Gram-positive bacteremia    Type 2 diabetes mellitus with chronic kidney disease, with long-term current use of insulin (HCC)  Assessment & Plan  · Will monitor fingersticks  · Insulin sliding scale  · Lantus 10 units at bedtime  · Diabetic diet  · Will adjust diabetic regimen as needed    Persistent atrial fibrillation (HCC)  Assessment & Plan  · Will continue Lopressor  · Continue home Eliquis  · Will discontinue 48 hours prior to surgery    VTE Pharmacologic Prophylaxis: Eliquis    Patient Centered Rounds: Patient care rounds were performed with nursing    Discussions with Specialists or Other Care Team Provider:  Case Management, Nursing, PT/OT    Education and Discussions with Family / Patient: Spoke with patient's wife via telephone    Time Spent for Care: 30  More than 50% of total time spent on counseling and coordination of care as described above      Current Length of Stay: 9 day(s)    Current Patient Status: Inpatient     Certification Statement: The patient will continue to require additional inpatient hospital stay due to amputation of left 2nd toe    Discharge Plan: Pending PT/OT evaluation and treatment    Code Status: Level 1 - Full Code      Subjective:   Patient seen and examined at bedside  No acute events overnight  Denies chest pain, SOB, diaphoresis, nausea/vomiting/diarrhea, fevers/chills  Objective:     Vitals:   Temp (24hrs), Av 9 °F (37 2 °C), Min:98 3 °F (36 8 °C), Max:99 4 °F (37 4 °C)    Temp:  [98 3 °F (36 8 °C)-99 4 °F (37 4 °C)] 99 2 °F (37 3 °C)  HR:  [60-66] 60  Resp:  [17-25] 18  BP: (114-150)/(55-67) 115/55  SpO2:  [89 %-93 %] 93 %  Body mass index is 27 9 kg/m²  Input and Output Summary (last 24 hours): Intake/Output Summary (Last 24 hours) at 2022 1034  Last data filed at 2022 0744  Gross per 24 hour   Intake 730 ml   Output 1100 ml   Net -370 ml       Physical Exam:     Physical Exam  Vitals and nursing note reviewed  Constitutional:       Appearance: He is well-developed  HENT:      Head: Normocephalic and atraumatic  Eyes:      Conjunctiva/sclera: Conjunctivae normal    Cardiovascular:      Rate and Rhythm: Normal rate and regular rhythm  Heart sounds: No murmur heard  Pulmonary:      Effort: Pulmonary effort is normal  No respiratory distress  Breath sounds: Normal breath sounds  Abdominal:      Palpations: Abdomen is soft  Tenderness: There is no abdominal tenderness  Musculoskeletal:      Cervical back: Neck supple  Comments: Left diabetic toe ulcer   Skin:     General: Skin is warm and dry  Neurological:      Mental Status: He is alert  Additional Data:     Labs:  I have reviewed pertinent results     Results from last 7 days   Lab Units 22  0521   WBC Thousand/uL 12 97*   HEMOGLOBIN g/dL 8 9*   HEMATOCRIT % 28 8*   PLATELETS Thousands/uL 319   NEUTROS PCT % 77*   LYMPHS PCT % 9*   MONOS PCT % 10   EOS PCT % 2     Results from last 7 days   Lab Units 22  0521   SODIUM mmol/L 134*   POTASSIUM mmol/L 4 3   CHLORIDE mmol/L 97   CO2 mmol/L 27   BUN mg/dL 27* CREATININE mg/dL 2 84*   ANION GAP mmol/L 10   CALCIUM mg/dL 8 6   GLUCOSE RANDOM mg/dL 109     Results from last 7 days   Lab Units 07/12/22  0459   INR  2 13*     Results from last 7 days   Lab Units 07/16/22  0707 07/15/22  2149 07/15/22  1624 07/15/22  1051 07/15/22  0726 07/14/22  2139 07/14/22  1552 07/14/22  1150 07/14/22  0732 07/13/22  2147 07/13/22  1613 07/13/22  1108   POC GLUCOSE mg/dl 127 188* 126 138 158* 213* 222* 276* 124 189* 194* 283*                 Imaging: I have reviewed pertinent imaging       Recent Cultures (last 7 days):     Results from last 7 days   Lab Units 07/15/22  1300 07/13/22  1354   BLOOD CULTURE  Received in Microbiology Lab  Culture in Progress  No Growth at 48 hrs  Last 24 Hours Medication List:   Current Facility-Administered Medications   Medication Dose Route Frequency Provider Last Rate    acetaminophen  650 mg Oral Q6H PRN Liliana Woodson PA-C      amLODIPine  10 mg Oral Daily Ifeoma Lauren MD      apixaban  5 mg Oral Q12H Dave Lorenzo DO      cefazolin  1,000 mg Intravenous Q24H Dave Lorenzo DO Stopped (07/15/22 1633)    insulin glargine  10 Units Subcutaneous HS Ifeoma Lauren MD      insulin lispro  1-6 Units Subcutaneous TID [facility] Mikaela Pineda MD      insulin lispro  1-6 Units Subcutaneous HS Stephania Driscoll PA-C      loperamide  2 mg Oral Q4H PRN Dave Lorenzo DO      midodrine  5 mg Oral Once per day on Mon Wed Fri Iefoma Lauren MD      polyethylene glycol  17 g Oral Daily PRN Dave Lorenzo DO      senna-docusate sodium  1 tablet Oral HS Dave Lorenzo DO      tamsulosin  0 4 mg Oral Daily With Greg Drew MD      torsemide  80 mg Oral Once per day on Sun Tue Thu Sat Ifeoma Lauren MD          Today, Patient Was Seen By: Dave Lorenzo DO    ** Please Note: Dictation voice to text software may have been used in the creation of this document   **

## 2022-07-16 NOTE — PLAN OF CARE
Problem: Potential for Falls  Goal: Patient will remain free of falls  Description: INTERVENTIONS:  - Educate patient/family on patient safety including physical limitations  - Instruct patient to call for assistance with activity   - Consult OT/PT to assist with strengthening/mobility   - Keep Call bell within reach  - Keep bed low and locked with side rails adjusted as appropriate  - Keep care items and personal belongings within reach  - Initiate and maintain comfort rounds  - Make Fall Risk Sign visible to staff  - Apply yellow socks and bracelet for high fall risk patients  - Consider moving patient to room near nurses station  Outcome: Progressing     Problem: MOBILITY - ADULT  Goal: Maintain or return to baseline ADL function  Description: INTERVENTIONS:  -  Assess patient's ability to carry out ADLs; assess patient's baseline for ADL function and identify physical deficits which impact ability to perform ADLs (bathing, care of mouth/teeth, toileting, grooming, dressing, etc )  - Assess/evaluate cause of self-care deficits   - Assess range of motion  - Assess patient's mobility; develop plan if impaired  - Assess patient's need for assistive devices and provide as appropriate  - Encourage maximum independence but intervene and supervise when necessary  - Involve family in performance of ADLs  - Assess for home care needs following discharge   - Consider OT consult to assist with ADL evaluation and planning for discharge  - Provide patient education as appropriate  Outcome: Progressing  Goal: Maintains/Returns to pre admission functional level  Description: INTERVENTIONS:  - Perform BMAT or MOVE assessment daily    - Set and communicate daily mobility goal to care team and patient/family/caregiver     - Collaborate with rehabilitation services on mobility goals if consulted  - Out of bed for toileting  - Record patient progress and toleration of activity level   Outcome: Progressing     Problem: PAIN - ADULT  Goal: Verbalizes/displays adequate comfort level or baseline comfort level  Description: Interventions:  - Encourage patient to monitor pain and request assistance  - Assess pain using appropriate pain scale  - Administer analgesics based on type and severity of pain and evaluate response  - Implement non-pharmacological measures as appropriate and evaluate response  - Consider cultural and social influences on pain and pain management  - Notify physician/advanced practitioner if interventions unsuccessful or patient reports new pain  Outcome: Progressing     Problem: INFECTION - ADULT  Goal: Absence or prevention of progression during hospitalization  Description: INTERVENTIONS:  - Assess and monitor for signs and symptoms of infection  - Monitor lab/diagnostic results  - Monitor all insertion sites, i e  indwelling lines, tubes, and drains  - Monitor endotracheal if appropriate and nasal secretions for changes in amount and color  - [place] appropriate cooling/warming therapies per order  - Administer medications as ordered  - Instruct and encourage patient and family to use good hand hygiene technique  - Identify and instruct in appropriate isolation precautions for identified infection/condition  Outcome: Progressing  Goal: Absence of fever/infection during neutropenic period  Description: INTERVENTIONS:  - Monitor WBC    Outcome: Progressing     Problem: SAFETY ADULT  Goal: Patient will remain free of falls  Description: INTERVENTIONS:  - Educate patient/family on patient safety including physical limitations  - Instruct patient to call for assistance with activity   - Consult OT/PT to assist with strengthening/mobility   - Keep Call bell within reach  - Keep bed low and locked with side rails adjusted as appropriate  - Keep care items and personal belongings within reach  - Initiate and maintain comfort rounds  - Make Fall Risk Sign visible to staff  - Apply yellow socks and bracelet for high fall risk patients  - Consider moving patient to room near nurses station  Outcome: Progressing  Goal: Maintain or return to baseline ADL function  Description: INTERVENTIONS:  -  Assess patient's ability to carry out ADLs; assess patient's baseline for ADL function and identify physical deficits which impact ability to perform ADLs (bathing, care of mouth/teeth, toileting, grooming, dressing, etc )  - Assess/evaluate cause of self-care deficits   - Assess range of motion  - Assess patient's mobility; develop plan if impaired  - Assess patient's need for assistive devices and provide as appropriate  - Encourage maximum independence but intervene and supervise when necessary  - Involve family in performance of ADLs  - Assess for home care needs following discharge   - Consider OT consult to assist with ADL evaluation and planning for discharge  - Provide patient education as appropriate  Outcome: Progressing  Goal: Maintains/Returns to pre admission functional level  Description: INTERVENTIONS:  - Perform BMAT or MOVE assessment daily    - Set and communicate daily mobility goal to care team and patient/family/caregiver     - Collaborate with rehabilitation services on mobility goals if consulted  - Out of bed for toileting  - Record patient progress and toleration of activity level   Outcome: Progressing     Problem: DISCHARGE PLANNING  Goal: Discharge to home or other facility with appropriate resources  Description: INTERVENTIONS:  - Identify barriers to discharge w/patient and caregiver  - Arrange for needed discharge resources and transportation as appropriate  - Identify discharge learning needs (meds, wound care, etc )  - Arrange for interpretive services to assist at discharge as needed  - Refer to Case Management Department for coordinating discharge planning if the patient needs post-hospital services based on physician/advanced practitioner order or complex needs related to functional status, cognitive ability, or social support system  Outcome: Progressing     Problem: Knowledge Deficit  Goal: Patient/family/caregiver demonstrates understanding of disease process, treatment plan, medications, and discharge instructions  Description: Complete learning assessment and assess knowledge base  Interventions:  - Provide teaching at level of understanding  - Provide teaching via preferred learning methods  Outcome: Progressing     Problem: Nutrition/Hydration-ADULT  Goal: Nutrient/Hydration intake appropriate for improving, restoring or maintaining nutritional needs  Description: Monitor and assess patient's nutrition/hydration status for malnutrition  Collaborate with interdisciplinary team and initiate plan and interventions as ordered  Monitor patient's weight and dietary intake as ordered or per policy  Utilize nutrition screening tool and intervene as necessary  Determine patient's food preferences and provide high-protein, high-caloric foods as appropriate       INTERVENTIONS:  - Monitor oral intake, urinary output, labs, and treatment plans  - Assess nutrition and hydration status and recommend course of action  - Evaluate amount of meals eaten  - Assist patient with eating if necessary   - Allow adequate time for meals  - Recommend/ encourage appropriate diets, oral nutritional supplements, and vitamin/mineral supplements  - Order, calculate, and assess calorie counts as needed  - Recommend, monitor, and adjust tube feedings and TPN/PPN based on assessed needs  - Assess need for intravenous fluids  - Provide specific nutrition/hydration education as appropriate  - Include patient/family/caregiver in decisions related to nutrition  Outcome: Progressing     Problem: Prexisting or High Potential for Compromised Skin Integrity  Goal: Skin integrity is maintained or improved  Description: INTERVENTIONS:  - Identify patients at risk for skin breakdown  - Assess and monitor skin integrity  - Assess and monitor nutrition and hydration status  - Monitor labs   - Assess for incontinence   - Turn and reposition patient  - Assist with mobility/ambulation  - Relieve pressure over bony prominences  - Avoid friction and shearing  - Provide appropriate hygiene as needed including keeping skin clean and dry  - Evaluate need for skin moisturizer/barrier cream  - Collaborate with interdisciplinary team   - Patient/family teaching  - Consider wound care consult   Outcome: Progressing     Problem: METABOLIC, FLUID AND ELECTROLYTES - ADULT  Goal: Electrolytes maintained within normal limits  Description: INTERVENTIONS:  - Monitor labs and assess patient for signs and symptoms of electrolyte imbalances  - Administer electrolyte replacement as ordered  - Monitor response to electrolyte replacements, including repeat lab results as appropriate  - Instruct patient on fluid and nutrition as appropriate  Outcome: Progressing  Goal: Fluid balance maintained  Description: INTERVENTIONS:  - Monitor labs   - Monitor I/O and WT  - Instruct patient on fluid and nutrition as appropriate  - Assess for signs & symptoms of volume excess or deficit  Outcome: Progressing  Goal: Glucose maintained within target range  Description: INTERVENTIONS:  - Monitor Blood Glucose as ordered  - Assess for signs and symptoms of hyperglycemia and hypoglycemia  - Administer ordered medications to maintain glucose within target range  - Assess nutritional intake and initiate nutrition service referral as needed  Outcome: Progressing     Problem: CARDIOVASCULAR - ADULT  Goal: Maintains optimal cardiac output and hemodynamic stability  Description: INTERVENTIONS:  - Monitor I/O, vital signs and rhythm  - Monitor for S/S and trends of decreased cardiac output  - Administer and titrate ordered vasoactive medications to optimize hemodynamic stability  - Assess quality of pulses, skin color and temperature  - Assess for signs of decreased coronary artery perfusion  - Instruct patient to report change in severity of symptoms  Outcome: Progressing  Goal: Absence of cardiac dysrhythmias or at baseline rhythm  Description: INTERVENTIONS:  - Continuous cardiac monitoring, vital signs, obtain 12 lead EKG if ordered  - Administer antiarrhythmic and heart rate control medications as ordered  - Monitor electrolytes and administer replacement therapy as ordered  Outcome: Progressing     Problem: RESPIRATORY - ADULT  Goal: Achieves optimal ventilation and oxygenation  Description: INTERVENTIONS:  - Assess for changes in respiratory status  - Assess for changes in mentation and behavior  - Position to facilitate oxygenation and minimize respiratory effort  - Oxygen administered by appropriate delivery if ordered  - Initiate smoking cessation education as indicated  - Encourage broncho-pulmonary hygiene including cough, deep breathe, Incentive Spirometry  - Assess the need for suctioning and aspirate as needed  - Assess and instruct to report SOB or any respiratory difficulty  - Respiratory Therapy support as indicated  Outcome: Progressing     Problem: GENITOURINARY - ADULT  Goal: Maintains or returns to baseline urinary function  Description: INTERVENTIONS:  - Assess urinary function  - Encourage oral fluids to ensure adequate hydration if ordered  - Administer IV fluids as ordered to ensure adequate hydration  - Administer ordered medications as needed  - Offer frequent toileting  - Follow urinary retention protocol if ordered  Outcome: Progressing  Goal: Absence of urinary retention  Description: INTERVENTIONS:  - Assess patients ability to void and empty bladder  - Monitor I/O  - Bladder scan as needed  - Discuss with physician/AP medications to alleviate retention as needed  - Discuss catheterization for long term situations as appropriate  Outcome: Progressing     Problem: SKIN/TISSUE INTEGRITY - ADULT  Goal: Skin Integrity remains intact(Skin Breakdown Prevention)  Description: Assess:  -Perform Mikey assessment  -Clean and moisturize skin   -Inspect skin when repositioning, toileting, and assisting with ADLS  -Assess under medical devices  -Assess extremities for adequate circulation and sensation     Bed Management:  -Have minimal linens on bed & keep smooth, unwrinkled  -Change linens as needed when moist or perspiring  -Avoid sitting or lying in one position for more     Toileting:  -Offer bedside commode  -Assess for incontinence  -Use incontinent care products after each incontinent episode    Activity:  -Mobilize patient  -Encourage activity and walks on unit  -Encourage or provide ROM exercises   -Turn and reposition patient  -Use appropriate equipment to lift or move patient in bed  -Instruct/ Assist with weight shifting  -Consider limitation of chair time  Skin Care:  -Avoid use of baby powder, tape, friction and shearing, hot water or constrictive clothing  -Relieve pressure over bony prominences  -Do not massage red bony areas    Next Steps:  -Teach patient strategies to minimize risks    -Consider consults to  interdisciplinary teams  Outcome: Progressing  Goal: Incision(s), wounds(s) or drain site(s) healing without S/S of infection  Description: INTERVENTIONS  - Assess and document dressing, incision, wound bed, drain sites and surrounding tissue  - Provide patient and family education  - Perform skin care/dressing changes  Outcome: Progressing     Problem: HEMATOLOGIC - ADULT  Goal: Maintains hematologic stability  Description: INTERVENTIONS  - Assess for signs and symptoms of bleeding or hemorrhage  - Monitor labs  - Administer supportive blood products/factors as ordered and appropriate  Outcome: Progressing     Problem: MUSCULOSKELETAL - ADULT  Goal: Maintain or return mobility to safest level of function  Description: INTERVENTIONS:  - Assess patient's ability to carry out ADLs; assess patient's baseline for ADL function and identify physical deficits which impact ability to perform ADLs (bathing, care of mouth/teeth, toileting, grooming, dressing, etc )  - Assess/evaluate cause of self-care deficits   - Assess range of motion  - Assess patient's mobility  - Assess patient's need for assistive devices and provide as appropriate  - Encourage maximum independence but intervene and supervise when necessary  - Involve family in performance of ADLs  - Assess for home care needs following discharge   - Consider OT consult to assist with ADL evaluation and planning for discharge  - Provide patient education as appropriate  Outcome: Progressing  Goal: Maintain proper alignment of affected body part  Description: INTERVENTIONS:  - Support, maintain and protect limb and body alignment  - Provide patient/ family with appropriate education  Outcome: Progressing

## 2022-07-16 NOTE — NURSING NOTE
Patient left unit to OR at 0715  Returned at 0800  Offers no complaints of pain  Vital signs stable  2L NC maintained at this time  Dressing clean, dry and intact  Alert and oriented x4  Patient offers no complaints of shortness of breath or chest pain  Will continue to monitor for changes

## 2022-07-16 NOTE — ANESTHESIA PREPROCEDURE EVALUATION
Procedure:  DEBRIDEMENT WOUND [facility] OUT), possible 2nd toe amputation (Left Foot)    Relevant Problems   CARDIO   (+) Acute on chronic diastolic congestive heart failure (HCC)   (+) Aortic valve stenosis, nonrheumatic   (+) Complete heart block (HCC)   (+) Hyperlipidemia   (+) Hypertension   (+) Other hyperlipidemia   (+) PVCs (premature ventricular contractions)   (+) Persistent atrial fibrillation (HCC)   (+) Renovascular hypertension   (+) SSS (sick sinus syndrome) (HCC)   (+) Systolic murmur      ENDO   (+) Type 2 diabetes mellitus with chronic kidney disease, with long-term current use of insulin (HCC)   (+) Type 2 diabetes mellitus with diabetic neuropathy, without long-term current use of insulin (HCC)      GI/HEPATIC   (+) Colon cancer ([address] )      /RENAL   (+) ELZBIETA (acute kidney injury) (HCC)   (+) Acute kidney injury superimposed on chronic kidney disease (HCC)   (+) ESRD (end stage renal disease) (HCC)   (+) Stage 4 chronic kidney disease (HCC)      HEMATOLOGY   (+) Anemia   (+) Iron deficiency anemia   (+) Other iron deficiency anemias      MUSCULOSKELETAL   (+) Arthritis      NEURO/PSYCH   (+) Anxiety   (+) Chronic painful diabetic neuropathy (HCC)      PULMONARY   (+) Acute respiratory failure with hypoxia (HCC)   (+) Chronic obstructive pulmonary disease (HCC)   (+) NICOLASA (obstructive sleep apnea)   (+) RSV (respiratory syncytial virus pneumonia)      Other   (+) Osteomyelitis of left foot (HCC)        Physical Exam    Airway    Mallampati score: III  TM Distance: >3 FB  Neck ROM: full     Dental   No notable dental hx     Cardiovascular  Cardiovascular exam normal    Pulmonary  Pulmonary exam normal     Other Findings    PPM in place, dependent  Dialyzed yesterday  Off pressors and O2    Anesthesia Plan  ASA Score- 3     Anesthesia Type- IV sedation with anesthesia with ASA Monitors  Additional Monitors:   Airway Plan:           Plan Factors-Exercise tolerance (METS): <4 METS  Chart reviewed  EKG reviewed  Existing labs reviewed  Patient summary reviewed  Patient is not a current smoker  Induction- intravenous  Postoperative Plan- Plan for postoperative opioid use  Informed Consent- Anesthetic plan and risks discussed with patient  I personally reviewed this patient with the CRNA  Discussed and agreed on the Anesthesia Plan with the CRNA  Danielle Vargas

## 2022-07-16 NOTE — OP NOTE
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, with necrosis of bone ([facility] ) [E11 621, L97 524]    Procedure(s) (LRB):  DEBRIDEMENT WOUND ([address] OUT), possible 2nd toe amputation (Left)  Procedures:  1  Left 2nd toe amputation   Specimen(s):  ID Type Source Tests Collected by Time Destination   1 : left foot tissue Tissue Foot, Left TISSUE EXAM [facility] 2022 3380    A :  Tissue Foot, Left ANAEROBIC CULTURE AND GRAM STAIN, CULTURE, TISSUE AND GRAM STAIN [facility] 2022 0745        Estimated Blood Loss:   Minimal    Drains:  * No LDAs found *    Anesthesia Type:   Choice    Operative Indications:  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, with necrosis of bone ([facility] ) [L30 947, L97 524]      Operative Findings:  Consistent with diagnosis    Complications:   None    Procedure and Technique:  Under mild sedation patient was brought in the operating room and prepared for the procedure on his liver  Foot was then scrubbed prepped and draped in the usual aseptic manner  A time-out was then performed to identify the correct patient location procedure and laterality  Attention was then directed to the remnant aspect of the left 2nd digit and there is an ulceration noted on the plantar aspect of the foot  An elliptical incision was made overlying the remainder of the 2nd digit and on to the wound    The the base of proximal phalanx was then resected, the metatarsal head was examined and appeared to be viable with no cartilaginous defects  There was a small pocket of pus of the base of proximal phalanx  The soft tissue was irrigated extensively  And post lavage cultures anaerobic and anaerobic were taken from the left 2nd digit toe amputation site  Incisional closure was obtained utilizing 3-0 nylon  The patient tolerated procedure and anesthesia well         I was present for the entire procedure    Patient Disposition:  PACU  and extubated and stable      SIGNATURE: Juana Charles DPM  DATE: July 16, 2022  TIME: 7:49 AM

## 2022-07-16 NOTE — HEMODIALYSIS
Post-Dialysis RN Treatment Note    Blood Pressure:  Pre 144/56  mm/Hg  Post  153/64 mmHg   EDW  89 5  kg    Weight:  Pre  91 3  kg   Post  89 5  kg   Mode of weight measurement:  Bed scale   Volume Removed  2000 ml    Treatment duration 120  minutes    NS given   No   Treatment shortened? Yes, per Dr Pena Inch   Medications given during Rx No   Estimated Kt/V  Unknown   Access type:  Right Permacath   Access Issues:  No   Report called to primary nurse    Yes   Pt  To remain in hosp  Tonight, and be discharged tomorrow

## 2022-07-16 NOTE — CASE MANAGEMENT
Case Management Discharge Planning Note    Patient name Shnae Welch  Location ICU 11/ICU  MRN [id number]  : 1955 Date 2022       Current Admission Date: 2022  Current Admission Diagnosis:Diabetic infection of left foot [facility])   Patient Active Problem List    Diagnosis Date Noted    Gram-positive bacteremia 2022    Acute respiratory failure with hypoxia ([facility] ) 2022    ESRD (end stage renal disease) ([facility] ) 2022    Diabetic infection of left foot ([facility] ) 2022    Severe sepsis with lactic acidosis ([place] [address] ) 2022    S/P arteriovenous (AV) fistula creation 2022    Pulmonary hypertension ([place] [address] )     Other iron deficiency anemias 04/15/2022    Acute on chronic diastolic congestive heart failure ([facility] ) 2022    Chemotherapy-induced nausea 2022    Chemotherapy-induced neutropenia ([facility] ) 2022    Acute kidney injury superimposed on chronic kidney disease ([facility] ) 2021    RSV (respiratory syncytial virus pneumonia) 2021    Colon cancer ([place] [address] ) 10/23/2021    Absence of toe ([place] [address] ) 2021    Chronic obstructive pulmonary disease ([facility] ) 2020    SSS (sick sinus syndrome) ([facility] ) 2020    Onychomycosis 2020    Chronic painful diabetic neuropathy ([facility] ) 2020    Renovascular hypertension     Multiple drug resistant organism (MDRO) culture positive 2020    Amputation of left great toe ([facility] ) 2020    Osteomyelitis of left foot ([facility] ) 2020    Hyponatremia 2020    Iron deficiency anemia 2020    Anxiety 2020    Staphylococcus aureus bacteremia 2020    Elevated troponin I level 2020    Type 2 diabetes mellitus with diabetic neuropathy, without long-term current use of insulin ([facility] ) 2020    S/P amputation of lesser toe, left ([facility] ) 2019    S/P amputation of lesser toe, right ([facility] ) 2019    Left renal artery stenosis (HCC) 2019    Chronic diastolic (congestive) heart failure ([facility] ) 06/05/2019    Acute pulmonary edema (HCC)     Hyperkalemia 05/16/2019    Abnormal CT of the chest 05/15/2019    Mitral stenosis     Anemia 05/02/2019    Fever 05/02/2019    Aortic valve stenosis, nonrheumatic 11/27/2018    Type 2 diabetes mellitus with chronic kidney disease, with long-term current use of insulin ([facility] ) 11/14/2018    Hypertension 11/14/2018    Stage 4 chronic kidney disease ([facility] ) 11/14/2018    NICOLASA (obstructive sleep apnea) 11/11/2018    Urinary retention 11/06/2018    Persistent proteinuria 11/05/2018    Volume overload 11/05/2018    Persistent atrial fibrillation ([facility] ) 11/04/2018    Complete heart block (HCC) 34/66/6380    Metabolic acidosis 74/02/1300    ELZBIETA (acute kidney injury) ([place] [address] ) 11/01/2018    Other hyperlipidemia 11/01/2018    Easy bruising 09/27/2017    Peripheral arterial disease ([facility] ) 09/06/2017    Vertigo 08/16/2017    Diabetic ulcer of toe of left foot associated with type 2 diabetes mellitus ([place] [address] ) 03/18/2017    Eczema 12/11/2015    PVCs (premature ventricular contractions) 50/03/4491    Systolic murmur 54/69/7465    Bilateral leg edema 07/09/2015    Erectile dysfunction of non-organic origin 04/24/2012    Hyperlipidemia 04/24/2012    Uremic acidosis 04/24/2012    Arthritis 04/24/2012    Rhinitis 04/24/2012      LOS (days): 9  Geometric Mean LOS (GMLOS) (days): 9 60  Days to GMLOS:0 7     OBJECTIVE:  Risk of Unplanned Readmission Score: 37 6     Current admission status: Inpatient   Preferred Pharmacy:   [id number] [address] [facility], [address]  [address]  [address]  Phone: [phone] Fax: [phone]    Primary Care Provider: Joann Cotto DO    Primary Insurance: MEDICARE  Secondary Insurance: AARP    DISCHARGE DETAILS:  Pt has been evaluated by [facility] and is stable to be discharged  Pt declined any [facility] services at this time  Pt will get his IVABX after hemodialysis M-W-F at [facility]  TC to [facility]ace [address] at [phone], left a message of pt requiring IVABX   Reviewed the IMM with the pt who is in agreement with same  Pt has the script for IVABX  Family will transport home

## 2022-07-17 VITALS
OXYGEN SATURATION: 92 % | BODY MASS INDEX: 27.84 KG/M2 | DIASTOLIC BLOOD PRESSURE: 63 MMHG | HEIGHT: 70 IN | WEIGHT: 194.45 LBS | HEART RATE: 60 BPM | RESPIRATION RATE: 16 BRPM | SYSTOLIC BLOOD PRESSURE: 134 MMHG | TEMPERATURE: 97.5 F

## 2022-07-17 LAB
ANION GAP SERPL CALCULATED.3IONS-SCNC: 9 MMOL/L (ref 4–13)
BASOPHILS # BLD AUTO: 0.03 THOUSANDS/ΜL (ref 0–0.1)
BASOPHILS NFR BLD AUTO: 0 % (ref 0–1)
BUN SERPL-MCNC: 24 MG/DL (ref 5–25)
CALCIUM SERPL-MCNC: 8.3 MG/DL (ref 8.4–10.2)
CHLORIDE SERPL-SCNC: 100 MMOL/L (ref 96–108)
CO2 SERPL-SCNC: 27 MMOL/L (ref 21–32)
CREAT SERPL-MCNC: 2.69 MG/DL (ref 0.6–1.3)
EOSINOPHIL # BLD AUTO: 0.3 THOUSAND/ΜL (ref 0–0.61)
EOSINOPHIL NFR BLD AUTO: 3 % (ref 0–6)
ERYTHROCYTE [DISTWIDTH] IN BLOOD BY AUTOMATED COUNT: 17.1 % (ref 11.6–15.1)
GFR SERPL CREATININE-BSD FRML MDRD: 23 ML/MIN/1.73SQ M
GLUCOSE SERPL-MCNC: 101 MG/DL (ref 65–140)
GLUCOSE SERPL-MCNC: 117 MG/DL (ref 65–140)
HCT VFR BLD AUTO: 27 % (ref 36.5–49.3)
HGB BLD-MCNC: 8.1 G/DL (ref 12–17)
IMM GRANULOCYTES # BLD AUTO: 0.06 THOUSAND/UL (ref 0–0.2)
IMM GRANULOCYTES NFR BLD AUTO: 1 % (ref 0–2)
LYMPHOCYTES # BLD AUTO: 0.97 THOUSANDS/ΜL (ref 0.6–4.47)
LYMPHOCYTES NFR BLD AUTO: 10 % (ref 14–44)
MAGNESIUM SERPL-MCNC: 2.1 MG/DL (ref 1.9–2.7)
MCH RBC QN AUTO: 32.1 PG (ref 26.8–34.3)
MCHC RBC AUTO-ENTMCNC: 30 G/DL (ref 31.4–37.4)
MCV RBC AUTO: 107 FL (ref 82–98)
MONOCYTES # BLD AUTO: 0.98 THOUSAND/ΜL (ref 0.17–1.22)
MONOCYTES NFR BLD AUTO: 10 % (ref 4–12)
NEUTROPHILS # BLD AUTO: 7.39 THOUSANDS/ΜL (ref 1.85–7.62)
NEUTS SEG NFR BLD AUTO: 76 % (ref 43–75)
NRBC BLD AUTO-RTO: 0 /100 WBCS
PHOSPHATE SERPL-MCNC: 3.8 MG/DL (ref 2.3–4.1)
PLATELET # BLD AUTO: 255 THOUSANDS/UL (ref 149–390)
PMV BLD AUTO: 9.4 FL (ref 8.9–12.7)
POTASSIUM SERPL-SCNC: 4 MMOL/L (ref 3.5–5.3)
RBC # BLD AUTO: 2.52 MILLION/UL (ref 3.88–5.62)
SODIUM SERPL-SCNC: 136 MMOL/L (ref 135–147)
WBC # BLD AUTO: 9.73 THOUSAND/UL (ref 4.31–10.16)

## 2022-07-17 PROCEDURE — 99239 HOSP IP/OBS DSCHRG MGMT >30: CPT | Performed by: INTERNAL MEDICINE

## 2022-07-17 PROCEDURE — 80048 BASIC METABOLIC PNL TOTAL CA: CPT | Performed by: INTERNAL MEDICINE

## 2022-07-17 PROCEDURE — 84100 ASSAY OF PHOSPHORUS: CPT | Performed by: INTERNAL MEDICINE

## 2022-07-17 PROCEDURE — 85025 COMPLETE CBC W/AUTO DIFF WBC: CPT | Performed by: INTERNAL MEDICINE

## 2022-07-17 PROCEDURE — 83735 ASSAY OF MAGNESIUM: CPT | Performed by: INTERNAL MEDICINE

## 2022-07-17 PROCEDURE — 82948 REAGENT STRIP/BLOOD GLUCOSE: CPT

## 2022-07-17 RX ADMIN — ACETAMINOPHEN 650 MG: 325 TABLET ORAL at 00:28

## 2022-07-17 RX ADMIN — TORSEMIDE 80 MG: 20 TABLET ORAL at 08:22

## 2022-07-17 NOTE — PHYSICAL THERAPY NOTE
PT cancel note       07/17/22 0921   PT Last Visit   PT Visit Date 07/17/22   Note Type   Note type Cancelled Session   Cancel Reasons Refusal   Dr Gabriela Tesfaye aware of refusal   Steve Saul, PT

## 2022-07-17 NOTE — DISCHARGE SUMMARY
Nir 43  Discharge- Radha Gracia 1955, 74 y o  male MRN: [id number]  Unit/Bed#: ICU 11-01 Encounter: [id number]  Primary Care Provider: Mikayla Garcia DO   Date and time admitted to hospital: 7/7/2022 12:44 PM    * Diabetic infection of left foot [facility])  Assessment & Plan  · Status post incision and drainage at the bedside with Podiatry  · Continue cefazolin as per ID  · Pharmacy consult for vancomycin trough management  · Podiatry consulted ; appreciate recommendations  · Follow-up bone scan  · Planned for amputation of 2nd left toe today with Podiatry  · Infectious Diseases following    Acute respiratory failure with hypoxia ([address] )  Assessment & Plan  · Requiring 2 L of nasal cannula supplemental O2 this morning  · Possibly secondary to volume overload  · Wean O2 as tolerated  · Nephrology following ; appreciate recommendations regarding possible diuresis  · Will remove volume during HD treatments  · Goal SpO2 > 90%    Severe sepsis with lactic acidosis ([address] )  Assessment & Plan  · Resolved with IV fluid resuscitation and broad-spectrum IV antibiotics  · Secondary to diabetic foot infection present on admission with fever and leukocytosis  · Lactic acidosis resolved  · Will follow-up bone scan of left foot  · Continue IV antibiotics with cefazolin  · Infectious Diseases following  · Trend procalcitonin  · Blood cultures positive for gram-positive cocci ; repeat blood cultures with no growth    Gram-positive bacteremia  Assessment & Plan  · Source likely left diabetic toe ulcer  · Patient has cleared bacteremia  · TTE with no signs of agitation  · After multi disciplinary discussion it was decided that patient is too high risk for ppm removal and will instead undergo 6 weeks of IV antibiotics in the setting of Gram-positive bacteremia with hardware in place  · Likely MSSA  · Repeat blood cultures with no growth to date  · Continue cefazolin as per ID for a 6 week course through 8/19/2022 ; 2g/2g/3g dosed after HD MWF  · Follow-up repeat blood cultures ; no growth at 48 hours  · No need for ELBERT or ppm removal at this time as per ID ; will need extended course of IV antibiotics  · Chemo port has been removed by IR as patient will no longer be undergoing chemotherapy    ESRD (end stage renal disease) [facility])  Assessment & Plan  Lab Results   Component Value Date    EGFR 22 07/12/2022    EGFR 15 07/11/2022    EGFR 14 07/10/2022    CREATININE 2 77 (H) 07/12/2022    CREATININE 3 85 (H) 07/11/2022    CREATININE 4 06 (H) 07/10/2022   · ESRD on HD Monday/Wednesday/Friday  · Nephrology following ; appreciate recommendations  · 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, sevelamer    Colon cancer [facility])  Assessment & Plan  · Patient follows with Hematology/Oncology  · Last chemo session was on 07/05/2022  · Chemo-port will be removed in the setting of Gram-positive bacteremia    Type 2 diabetes mellitus with chronic kidney disease, with long-term current use of insulin ([facility])  Assessment & Plan  · Will monitor fingersticks  · Insulin sliding scale  · Lantus 10 units at bedtime  · Diabetic diet  · Will adjust diabetic regimen as needed    Persistent atrial fibrillation ([address] )  Assessment & Plan  · Will continue Lopressor  · Continue home Eliquis  · Will discontinue 48 hours prior to surgery      Discharging Physician / Practitioner: Monserrat Mccullough DO  PCP: Sydney Ayala DO  Admission Date:   Admission Orders (From admission, onward)     Ordered        07/07/22 1437  Inpatient Admission  Once            07/07/22 1425  Inpatient Admission  Once,   Status:  Canceled                      Discharge Date: 07/17/22    Medical Problems             Resolved Problems  Date Reviewed: 7/17/2022   None                 Consultations During Hospital Stay:  Cardiology, Infectious Diseases, Podiatry    Procedures Performed:  Left toe amputation, transthoracic echocardiogram, chemo port removal, bone scan    Significant Findings / Test Results:     NM ceretec abscess localization limited    Result Date: 7/13/2022  Impression: 1  Persistent radiotracer activity centered in the region of the left 2nd proximal phalanx amputation site and surrounding soft tissues, corresponding with the history of cellulitis/abscess  Osseous involvement is difficult to exclude due to the amorphous appearance of the radiotracer  Workstation performed: [id number]     IR port Removal    Result Date: 7/12/2022  Impression: Fluoroscopically guided removal of right chest port The tunnel dialysis catheter will be managed based on upcoming ELBERT, blood cultures  It may be appropriate to consider over-the-wire exchange in this patient with limited access options  Workstation performed: [id number]       Incidental Findings:  Not applicable    Test Results Pending at Discharge (will require follow up): Not applicable     Outpatient Tests Requested: Follow-up CBC/CMP    Reason for Admission:  Left toe infection    Hospital Course:     Garwin Brunner is a 74 y o  male who presents with foot infection  Patient was sent from his oncologist's office to the ER for further evaluation of erythema on left foot  Patient with history of diabetes and also with history of osteomyelitis requiring amputation of 1st digit on left foot  Patient states that he noticed the redness 24 hours ago which has gradually gotten worse  Also has an open wound on the bottom of his left forefoot  Patient denies any pain complaints  Patient has been having fever/chills over the last 24 hours  Patient denies any nausea or vomiting  No recent trauma  The patient was found to have Gram-positive bacteremia thus he was started on broad-spectrum antibiotics and his chemo port was removed  Transthoracic echocardiogram with no signs of agitation  The patient eventually underwent amputation of toe on left foot    It was decided that the patient is too high risk for pacemaker removal as per cardiology and also the patient did clear his Gram-positive bacteremia so pacemaker removal was held off  A 6 week course of IV antibiotics was recommended by Infectious Diseases and the patient will receive dosing with hemodialysis on Monday/Wednesday/Friday  Podiatry will see the patient prior to discharge to assure postoperative healing of amputation  The patient adamantly refused home care  He was strongly encouraged to resume all preadmission medications at all preadmission dosages  Again, he is to receive cefazolin dosed with hemodialysis on Monday/Wednesday/Friday over the course of the next 6 weeks  A prescription was provided to the patient prior to discharge  He was also strongly encouraged to follow-up with his Podiatrist in the outpatient setting  He was discharged in stable condition on 07/17/2022  Condition at Discharge: stable     Discharge Day Visit / Exam:     Subjective: Patient seen and examined at bedside  No acute events overnight  Denies chest pain, SOB, diaphoresis, nausea/vomiting/diarrhea, fevers/chills  Vitals: Blood Pressure: 133/60 (07/17/22 0417)  Pulse: 60 (07/17/22 0455)  Temperature: (!) 97 4 °F (36 3 °C) (07/17/22 0400)  Temp Source: Temporal (07/17/22 0400)  Respirations: 22 (07/17/22 0455)  Height: 5' 10" (177 8 cm) (07/08/22 2000)  Weight - Scale: 88 2 kg (194 lb 7 1 oz) (07/16/22 0500)  SpO2: (!) 87 % (07/17/22 0455)     Exam:   Physical Exam  Vitals and nursing note reviewed  Constitutional:       Appearance: He is well-developed  HENT:      Head: Normocephalic and atraumatic  Eyes:      Conjunctiva/sclera: Conjunctivae normal    Cardiovascular:      Rate and Rhythm: Normal rate and regular rhythm  Heart sounds: No murmur heard  Pulmonary:      Effort: Pulmonary effort is normal  No respiratory distress  Breath sounds: Normal breath sounds  Abdominal:      Palpations: Abdomen is soft  Tenderness: There is no abdominal tenderness     Musculoskeletal: Cervical back: Neck supple  Skin:     General: Skin is warm and dry  Neurological:      Mental Status: He is alert  Discharge instructions/Information to patient and family:   See after visit summary for information provided to patient and family  Provisions for Follow-Up Care:  See after visit summary for information related to follow-up care and any pertinent home health orders  Disposition:     Home with family support  Outpatient follow-up with Podiatry  Cefazolin dosed with hemodialysis on Monday/Wednesday/Friday  Resume pre-admission medications     Discharge Statement:  I spent 60 minutes discharging the patient  This time was spent on the day of discharge  I had direct contact with the patient on the day of discharge  Greater than 50% of the total time was spent examining patient, answering all patient questions, arranging and discussing plan of care with patient as well as directly providing post-discharge instructions  Additional time then spent on discharge activities  Discharge Medications:  See after visit summary for reconciled discharge medications provided to patient and family        ** Please Note: This note has been constructed using a voice recognition system **

## 2022-07-17 NOTE — DISCHARGE INSTRUCTIONS
Podiatry: Please leave dressing intact until next appt  If the bandage gets wet, or strikethrough occurs, please change with adaptic, 4x4 gauze, kerlex, and an ACE bandage to secure the dressing   This dressing can be left intact for up to 7 days after discharge on 7/17

## 2022-07-17 NOTE — NURSING NOTE
Hospitalist in to see patient at this time, ok for discharge home, belongings sent with patient  IV site removed with catheter tip intact  Avs given and reviewed with patient, all questions/concerns answered at this time  Transported to main entrance at this time via wheelchair with PCA  Dressing changed per podiatry orders prior to discharge

## 2022-07-18 DIAGNOSIS — B95.61 MSSA BACTEREMIA: Primary | ICD-10-CM

## 2022-07-18 DIAGNOSIS — R78.81 MSSA BACTEREMIA: Primary | ICD-10-CM

## 2022-07-18 LAB — BACTERIA BLD CULT: NORMAL

## 2022-07-19 LAB
BACTERIA SPEC ANAEROBE CULT: NO GROWTH
BACTERIA TISS AEROBE CULT: NO GROWTH
GRAM STN SPEC: ABNORMAL

## 2022-07-20 ENCOUNTER — TELEPHONE (OUTPATIENT)
Dept: HEMATOLOGY ONCOLOGY | Facility: CLINIC | Age: 67
End: 2022-07-20

## 2022-07-20 LAB — BACTERIA BLD CULT: NORMAL

## 2022-07-20 NOTE — TELEPHONE ENCOUNTER
I left a msg for patients wife to return my call    Need to further discuss msg that she sent through [address]

## 2022-07-21 ENCOUNTER — HOSPITAL ENCOUNTER (OUTPATIENT)
Dept: NON INVASIVE DIAGNOSTICS | Facility: HOSPITAL | Age: 67
Discharge: HOME | End: 2022-07-21
Payer: MEDICARE

## 2022-07-21 DIAGNOSIS — Z98.890 S/P ARTERIOVENOUS (AV) FISTULA CREATION: ICD-10-CM

## 2022-07-21 PROCEDURE — 93990 DOPPLER FLOW TESTING: CPT

## 2022-07-22 ENCOUNTER — TELEPHONE (OUTPATIENT)
Dept: INFECTIOUS DISEASES | Facility: CLINIC | Age: 67
End: 2022-07-22

## 2022-07-22 PROCEDURE — 93990 DOPPLER FLOW TESTING: CPT | Performed by: SURGERY

## 2022-07-22 NOTE — TELEPHONE ENCOUNTER
Patient cancelled his upcoming visit with us next week  Per patient's wife, he is struggling with balancing all of his appointments and just cannot make it  He would like to change this visit to a virtual since he has dialysis three days a week, so appt would have to be on Tue/Th  Changed the appt and made arrangements for virtual visit

## 2022-07-26 ENCOUNTER — TELEMEDICINE (OUTPATIENT)
Dept: INFECTIOUS DISEASES | Facility: CLINIC | Age: 67
End: 2022-07-26
Payer: MEDICARE

## 2022-07-26 DIAGNOSIS — Z79.2 LONG TERM (CURRENT) USE OF ANTIBIOTICS: ICD-10-CM

## 2022-07-26 DIAGNOSIS — E11.628 DIABETIC INFECTION OF LEFT FOOT (HCC): ICD-10-CM

## 2022-07-26 DIAGNOSIS — R78.81 STAPHYLOCOCCUS AUREUS BACTEREMIA: ICD-10-CM

## 2022-07-26 DIAGNOSIS — N18.6 ESRD (END STAGE RENAL DISEASE) (HCC): ICD-10-CM

## 2022-07-26 DIAGNOSIS — B95.61 STAPHYLOCOCCUS AUREUS BACTEREMIA: ICD-10-CM

## 2022-07-26 DIAGNOSIS — E11.40 TYPE 2 DIABETES MELLITUS WITH DIABETIC NEUROPATHY, WITHOUT LONG-TERM CURRENT USE OF INSULIN (HCC): Primary | ICD-10-CM

## 2022-07-26 DIAGNOSIS — L08.9 DIABETIC INFECTION OF LEFT FOOT (HCC): ICD-10-CM

## 2022-07-26 PROCEDURE — 99214 OFFICE O/P EST MOD 30 MIN: CPT | Performed by: INTERNAL MEDICINE

## 2022-07-26 NOTE — PATIENT INSTRUCTIONS
Continue your medication as ordered with Dialysis  Weekly labs at Dialysis as ordered  Follow up in 2 weeks, or as needed sooner

## 2022-07-26 NOTE — PROGRESS NOTES
Progress Note - Infectious Disease   Rohit Kaufman 74 y o  male MRN: [id number]  Unit/Bed#:  Encounter: [id number]    REQUIRED DOCUMENTATION:   1  This service was provided via Telemedicine  2  Provider located at [address] [place] [address]  3  TeleMed provider: Jorge Interiano DO   4  Identify all parties in room with patient during tele consult: Pt and his wife  5  After connecting through Jack Erwin, patient was identified by name and date of birth  Patient was then informed that this was a Telemedicine visit and that the exam was being conducted confidentially over secure lines  My office door was closed  Patient acknowledged consent and understanding of privacy and security of the Telemedicine visit  I informed the patient that I have reviewed their record in Epic and presented the opportunity for them to ask any questions regarding the visit today  The patient agreed to participate  Impression/Recommendations:   1  MSSA bacteremia  Due to left foot cellulitis  Repeat blood cultures negative  Challenging situation as patient has underlying PPM, left shoulder hardware, right chest diaylsis catheter and PortACath  TTE no vegetations but not diagnostic  Patient would not wish to undergo major surgery, PPM removal  He would favor empiric treatment course and then invasive testing/treatments (ELBERT if infection relapses)  PortACath removed 7/12/22  Nephrology wishes to attempt HD catheter salvage given poor access  -continue IV cefazolin 2g/2g/3g dosed after HD MWF              -recommend a 6 week course of IV antibiotics through 8/19/22              -check surveillance blood cultures 2 weeks after completion of antibiotics               -weekly CBCD and CMP on IV antibiotics               -Ideally HD catheter would also be removed or exchanged; however, it was salvaged per patient and nephrology services wishes  Catheter will need to be removed if infection recurs     -ID follow up in 2 weeks    2  Left foot cellulitis and abscess  Status post bedside debridement by Podiatry 7/10  Cerectec scan nondiagnostic of osteomyelitis  S/p operative debridement and 2nd toe amputation on 7/16               -Podiatry follow-up advised, pt has appt tonight     3  Type 2 diabetes  Risk factor for infection, poor wound healing  HbA1c of 6 0 on 2/18/22               -Glycemic control to aid with wound healing as per primary team     4  Sigmoid cancer  Last cycle chemotherapy was 7/5 via R chest port-a-cath  Risk factor for immune suppression              -Management per Heme-Onc     5  ESRD on dialysis  Via R permacath with no local signs of infection  Patient and nephrology wish to attempt catheter salvage                -Management per nephrology      My recommendations were discussed with the patient in detail who verbalized understanding  Thank you for allowing me to participate in the care of this patient  The ID service will follow  History   Subjective:  75-year-old man seen for office follow-up after hospital discharge from [facility] on 7/17/2022  During that hospitalization, patient was seen by the Infectious Disease service for evaluation of sepsis and MSSA bacteremia which is felt to be due to left foot cellulitis and abscess  Patient was treated with cefazolin IV during his hospitalization  Upon hospital discharge, a 6 week treatment course was advised thru August 19th  Currently patient is receiving cefazolin IV 3 times a week with dialysis  Pt says he feels fine  He has been tolerating antibiotic therapy  His left foot wound is healing and he has a follow-up appt with his podiatrist tonight  He denies fevers, chills, or sweats  Patient denies nausea, vomiting, diarrhea, or rash      Antibiotics: cefazolin iv    Current Outpatient Medications:     allopurinol (ZYLOPRIM) 300 mg tablet, Take 1 tablet (300 mg total) by mouth every morning, Disp: 90 tablet, Rfl: 0    amLODIPine (NORVASC) 10 mg tablet, Take 1 tablet (10 mg total) by mouth daily, Disp: 90 tablet, Rfl: 3    apixaban (ELIQUIS) 5 mg, Take 1 tablet (5 mg total) by mouth every 12 (twelve) hours (Patient taking differently: Take by mouth every 12 (twelve) hours), Disp: 180 tablet, Rfl: 3    b complex-vitamin C-folic acid (NEPHROCAPS) 1 mg capsule, Take 1 capsule by mouth in the morning , Disp: 90 capsule, Rfl: 3    ceFAZolin (ANCEF) 1000 mg IVPB, 2g/2g/3g dosed after HD M/W/F, Disp: 1 each, Rfl: 0    Dupilumab (Dupixent) 300 MG/2ML SOPN, Inject 300 mg under the skin Once every 2 weeks, Disp: , Rfl:     famotidine (PEPCID) 40 MG tablet, TAKE 1 TABLET BY MOUTH EVERY DAY, Disp: 90 tablet, Rfl: 3    Insulin Degludec-Liraglutide (Xultophy) 100 units-3 6 mg/mL injection pen, Inject 19 units daily  , Disp: 18 mL, Rfl: 0    Insulin Pen Needle (BD Pen Needle Zenaida U/F) 32G X 4 MM MISC, Use 1 daily, Disp: 100 each, Rfl: 2    metoprolol tartrate (LOPRESSOR) 50 mg tablet, Take 1 tablet (50 mg total) by mouth every 12 (twelve) hours, Disp: 180 tablet, Rfl: 3    midodrine (PROAMATINE) 5 mg tablet, Take 1 tablet (5 mg total) by mouth 3 (three) times a week Take before dialysis, Disp: 30 tablet, Rfl: 2    omega-3-acid ethyl esters (LOVAZA) 1 g capsule, Take 2 capsules (2 g total) by mouth 2 (two) times a day Mail print prescription to pt, Disp: 360 capsule, Rfl: 3    ondansetron (ZOFRAN) 8 mg tablet, Take 1 tablet (8 mg total) by mouth every 8 (eight) hours as needed for nausea or vomiting, Disp: 20 tablet, Rfl: 3    OneTouch Ultra test strip, USE TO TEST BLOOD SUGAR 3 TIMES A DAY, Disp: 100 each, Rfl: 3    sevelamer carbonate (RENVELA) 800 mg tablet, Take 1 tablet (800 mg total) by mouth in the morning and 1 tablet (800 mg total) at noon and 1 tablet (800 mg total) in the evening  Take with meals  , Disp: 270 tablet, Rfl: 3    simvastatin (ZOCOR) 20 mg tablet, Take 1 tablet (20 mg total) by mouth daily at bedtime, Disp: 90 tablet, Rfl: 3    tamsulosin (FLOMAX) 0 4 mg, TAKE 1 CAPSULE BY MOUTH EVERY DAY WITH DINNER, Disp: 90 capsule, Rfl: 3    torsemide (DEMADEX) 20 mg tablet, Take 4 tablets (80 mg total) by mouth 4 (four) times a week Take on non-dialysis days only, Disp: 90 tablet, Rfl: 3    Physical Exam   Limited exam due to tele health visit  General Appearance:  Appearing well, nontoxic, and in no distress   Head:  Normocephalic, atraumatic   Eyes:  EOMI   Throat: Oropharynx moist    Lungs:   Respirations nonlabored on room air   Extremities: No distal leg edema b/l per RN   Skin: RT ACW tunneled dialysis catheter without erythema  LT forearm AVF noted  LT ACW PPM intact without surrounding erythema  No visible rash noted  Neurologic: Alert and oriented to person, surroundings, conversant, fluent speech     Invasive Devices:   Hemodialysis AV Fistula 06/01/22 Left Forearm (Active)       HD Permanent Double Catheter (Active)     Labs, Imaging, & Other Studies   Lab Results: I have personally reviewed pertinent labs  7/20/22:  Hb 8 8,   K 5 1, Ca corrected 8 8,     Imaging Studies:   I have personally reviewed pertinent imaging study reports  Counseling/Coordination of care: Total 30 minutes encounter including direct communication with the patient via telehealth, chart review and coordination of care  Labs, medical tests and imaging studies were independently reviewed by me as noted above  VIRTUAL VISIT DISCLAIMER  The patient has consented to an online visit or consultation  The patient understands that the online visit is based solely on information provided by them, and that, in the absence of a face-to-face physical evaluation by the physician, the diagnosis they receive are both limited and provisional in terms of accuracy and completeness  This is not intended to replace a full medical face-to-face evaluation by the physician  The patient understands and accepts these terms

## 2022-07-27 ENCOUNTER — OFFICE VISIT (OUTPATIENT)
Dept: HEMATOLOGY ONCOLOGY | Facility: CLINIC | Age: 67
End: 2022-07-27
Payer: MEDICARE

## 2022-07-27 VITALS
HEART RATE: 72 BPM | DIASTOLIC BLOOD PRESSURE: 70 MMHG | SYSTOLIC BLOOD PRESSURE: 122 MMHG | HEIGHT: 70 IN | OXYGEN SATURATION: 92 % | WEIGHT: 197 LBS | RESPIRATION RATE: 18 BRPM | TEMPERATURE: 98.3 F | BODY MASS INDEX: 28.2 KG/M2

## 2022-07-27 DIAGNOSIS — I10 PRIMARY HYPERTENSION: ICD-10-CM

## 2022-07-27 DIAGNOSIS — C76.2 ABDOMINAL CARCINOMATOSIS (HCC): ICD-10-CM

## 2022-07-27 DIAGNOSIS — I48.19 PERSISTENT ATRIAL FIBRILLATION (HCC): ICD-10-CM

## 2022-07-27 DIAGNOSIS — E11.22 TYPE 2 DIABETES MELLITUS WITH STAGE 4 CHRONIC KIDNEY DISEASE, WITH LONG-TERM CURRENT USE OF INSULIN (HCC): ICD-10-CM

## 2022-07-27 DIAGNOSIS — N18.6 END STAGE RENAL DISEASE ON DIALYSIS (HCC): ICD-10-CM

## 2022-07-27 DIAGNOSIS — E11.628 DIABETIC INFECTION OF LEFT FOOT (HCC): ICD-10-CM

## 2022-07-27 DIAGNOSIS — Z79.4 TYPE 2 DIABETES MELLITUS WITH STAGE 4 CHRONIC KIDNEY DISEASE, WITH LONG-TERM CURRENT USE OF INSULIN (HCC): ICD-10-CM

## 2022-07-27 DIAGNOSIS — S98.112A AMPUTATION OF LEFT GREAT TOE (HCC): ICD-10-CM

## 2022-07-27 DIAGNOSIS — R78.81 STAPHYLOCOCCUS AUREUS BACTEREMIA: ICD-10-CM

## 2022-07-27 DIAGNOSIS — E11.40 TYPE 2 DIABETES MELLITUS WITH DIABETIC NEUROPATHY, WITHOUT LONG-TERM CURRENT USE OF INSULIN (HCC): ICD-10-CM

## 2022-07-27 DIAGNOSIS — C18.9 MALIGNANT NEOPLASM OF COLON, UNSPECIFIED PART OF COLON (HCC): Primary | ICD-10-CM

## 2022-07-27 DIAGNOSIS — I73.9 PERIPHERAL ARTERIAL DISEASE (HCC): ICD-10-CM

## 2022-07-27 DIAGNOSIS — Z98.890 S/P ARTERIOVENOUS (AV) FISTULA CREATION: ICD-10-CM

## 2022-07-27 DIAGNOSIS — E11.621 DIABETIC ULCER OF TOE OF LEFT FOOT ASSOCIATED WITH TYPE 2 DIABETES MELLITUS, UNSPECIFIED ULCER STAGE (HCC): ICD-10-CM

## 2022-07-27 DIAGNOSIS — N18.4 TYPE 2 DIABETES MELLITUS WITH STAGE 4 CHRONIC KIDNEY DISEASE, WITH LONG-TERM CURRENT USE OF INSULIN (HCC): ICD-10-CM

## 2022-07-27 DIAGNOSIS — I50.32 CHRONIC DIASTOLIC (CONGESTIVE) HEART FAILURE (HCC): ICD-10-CM

## 2022-07-27 DIAGNOSIS — Z99.2 END STAGE RENAL DISEASE ON DIALYSIS (HCC): ICD-10-CM

## 2022-07-27 DIAGNOSIS — L97.529 DIABETIC ULCER OF TOE OF LEFT FOOT ASSOCIATED WITH TYPE 2 DIABETES MELLITUS, UNSPECIFIED ULCER STAGE (HCC): ICD-10-CM

## 2022-07-27 DIAGNOSIS — Z89.422 S/P AMPUTATION OF LESSER TOE, LEFT (HCC): ICD-10-CM

## 2022-07-27 DIAGNOSIS — E11.40 CHRONIC PAINFUL DIABETIC NEUROPATHY (HCC): ICD-10-CM

## 2022-07-27 DIAGNOSIS — L08.9 DIABETIC INFECTION OF LEFT FOOT (HCC): ICD-10-CM

## 2022-07-27 DIAGNOSIS — I35.0 AORTIC VALVE STENOSIS, NONRHEUMATIC: ICD-10-CM

## 2022-07-27 DIAGNOSIS — B95.61 STAPHYLOCOCCUS AUREUS BACTEREMIA: ICD-10-CM

## 2022-07-27 PROCEDURE — 99215 OFFICE O/P EST HI 40 MIN: CPT | Performed by: INTERNAL MEDICINE

## 2022-07-27 NOTE — PATIENT INSTRUCTIONS
7/8/2021      RE: Khadar Weems  [address]  [facility] [address]       ESTABLISHED PATIENT FOLLOW-UP:  RECHECK (Left shoulder MRI)       HISTORY OF PRESENT ILLNESS  Mr. Weems is a pleasant 34 year old year old male who presents to clinic today for follow-up of left shoulder MRI.     Date of injury/onset: 9 months ago  Date last seen: 6/21/21  Following Therapeutic Plan: Yes  Pain: Slightly improved  Function: Unchanged  Interval History: Overall no change since last visit.      Review of Systems:    Do you have fever, chills, weight loss? No    Do you have any vision problems? No    Do you have any chest pain or edema? No    Do you have any shortness of breath or wheezing?  Yes, due to anxiety    Do you have stomach problems? No    Do you have any numbness or focal weakness? Yes, thoracic spine paresthesia    Do you have diabetes? No    Do you have problems with bleeding or clotting? No    Do you have an rashes or other skin lesions? Yes, psoriasis    MEDICAL HISTORY  Patient Active Problem List   Diagnosis     CARDIOVASCULAR SCREENING; LDL GOAL LESS THAN 160     Sensation of fullness in ear     Recurrent boils     Abdominal pain, unspecified abdominal location     Leg length discrepancy     Elevated blood pressure reading without diagnosis of hypertension     CSF leak     Substance abuse (H)     PATEL (generalized anxiety disorder)     Chronic neck pain     DDD (degenerative disc disease), cervical     Chronic back pain, unspecified back location, unspecified back pain laterality       Current Outpatient Medications   Medication Sig Dispense Refill     cetirizine (ZYRTEC) 10 MG tablet Take 10 mg by mouth daily Reported on 5/4/2017       diazepam (VALIUM) 5 MG tablet Take 2 tablets (10 mg) by mouth once as needed for anxiety Pre-procedural anxiolytic for MRI.  Please take 2 tabs 30-60 minutes prior to MRI.  Avoid taking narcotics in conjunction with this medication.  Avoid alcohol intake 24 hours  Ordered PET scan    Follow-up in 2 weeks "prior to and after taking this medication. 2 tablet 0     diazepam (VALIUM) 5 MG tablet Take 1 Tab as directed by MRI staff.  May repeat once. 2 tablet 0     DULoxetine (CYMBALTA) 60 MG capsule Take 1 capsule (60 mg) by mouth daily 60 capsule 1     Ginger, Zingiber officinalis, (GINGER ROOT) 550 MG CAPS capsule Take 550 mg by mouth daily Reported on 5/4/2017       hydrOXYzine (ATARAX) 25 MG tablet Take 1-2 tablets (25-50 mg) by mouth every 8 hours as needed for itching or anxiety (Patient not taking: Reported on 6/10/2021) 60 tablet 0     naproxen sodium 220 MG capsule Take 220 mg by mouth 2 times daily (with meals)       propranolol (INDERAL) 10 MG tablet Take 1 tablet (10 mg) by mouth 3 times daily as needed (anxiety) 30 tablet 1       No Known Allergies    Family History   Problem Relation Age of Onset     Cardiovascular Maternal Grandmother         COPD     Blood Disease Maternal Grandfather         Blood clot       Additional medical/Social/Surgical histories reviewed in [facility] and updated as appropriate.       PHYSICAL EXAM  Ht 1.816 m (5' 11.5\")   Wt 120.7 kg (266 lb)   BMI 36.58 kg/m      Deferred    IMAGING : MRI CDI LEFT SHOULDER   MRI revealing SLAP tear of left shoulder        ASSESSMENT & PLAN  Mr. Weems is a 34 year old year old male who presents to clinic today with RECHECK (Left shoulder MRI)    MR confirming SLAP tear of labrum    Conservative treatment options reviewed and he has performed extensive therapy on left shoulder, mirroring labrum exercises from right labrum tear.  I did offer formal PT for left shoulder, considering corticosteroid injection. He does want to return to his shoulder surgeon at [facility] to discuss surgical intervention as he feels he has exhausted conservative measures.  Referral provided and he may return to his surgeon at [facility] Rika. Happy to set him up with one of our arthroscopic shoulder surgeons here as well if he desires.    It was a pleasure seeing Lauro Arguello " DO [place], [facility]  Primary Care Sports Medicine          Jos Toscano DO

## 2022-07-27 NOTE — PROGRESS NOTES
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 and on 10/21/2021 he had left anterior resection  3 of 16 lymph nodes showed metastatic disease  There was question of disease in the abdomen on PET-CT scan in January 22, stage III or 4  Patient has other comorbid conditions and because of that there was some delay in starting FOLFOX chemotherapy  He had problem tolerating chemotherapy and was hospitalized no after 1 treatment because of nausea, vomiting, dehydration and renal failure  Treatment was then changed to reduced dose of 5FU and Leucovorin and he was tolerating treatments without much problem  He was recently hospitalized earlier this month with Gram-positive septicemia and he is on Ancef  Infection probably started in his left foot  He has wound at the bottom of left foot that had been debrided  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  Port-A-Cath was removed  He has dialysis catheter in right upper chest and has dialysis fistula in left arm  He has been undergoing hemodialysis 3 days a week  He still has some redness and swelling of left foot and ankle  Pacemaker was not removed  Patient had PET-CT scan on 01/04/2022 and there was question of abdominal metastatic disease verses fat necrosis  Patient will have follow-up PET-CT scan to see if treatment has been working  He has limited options of systemic therapy because of other comorbid conditions like diabetes with complications like neuropathy and diabetic left foot, COPD and CHF, peripheral arterial disease, atrial fibrillation, hypertension, end-stage renal disease and recent septicemia and others  Patient is not sure if he will like to continue chemotherapy or not  He has tiredness  No GI symptoms ,  Bowels are moving  No abdominal pain  No cardiac and pulmonary symptoms at rest   No  symptoms    No headache and seizures  No bone pains  No  bleeding  No skin rash  Appetite is fair    History of chronic ulcer of left foot and patient follows with a podiatrist  Patient was being treated by our group member Dr Laxmi Hampton      Current Outpatient Medications:     allopurinol (ZYLOPRIM) 300 mg tablet, Take 1 tablet (300 mg total) by mouth every morning, Disp: 90 tablet, Rfl: 0    amLODIPine (NORVASC) 10 mg tablet, Take 1 tablet (10 mg total) by mouth daily, Disp: 90 tablet, Rfl: 3    apixaban (ELIQUIS) 5 mg, Take 1 tablet (5 mg total) by mouth every 12 (twelve) hours (Patient taking differently: Take by mouth every 12 (twelve) hours), Disp: 180 tablet, Rfl: 3    b complex-vitamin C-folic acid (NEPHROCAPS) 1 mg capsule, Take 1 capsule by mouth in the morning , Disp: 90 capsule, Rfl: 3    ceFAZolin (ANCEF) 1000 mg IVPB, 2g/2g/3g dosed after HD M/W/F, Disp: 1 each, Rfl: 0    Dupilumab (Dupixent) 300 MG/2ML SOPN, Inject 300 mg under the skin Once every 2 weeks, Disp: , Rfl:     famotidine (PEPCID) 40 MG tablet, TAKE 1 TABLET BY MOUTH EVERY DAY, Disp: 90 tablet, Rfl: 3    Insulin Degludec-Liraglutide (Xultophy) 100 units-3 6 mg/mL injection pen, Inject 19 units daily  , Disp: 18 mL, Rfl: 0    Insulin Pen Needle (BD Pen Needle Zenaida U/F) 32G X 4 MM MISC, Use 1 daily, Disp: 100 each, Rfl: 2    metoprolol tartrate (LOPRESSOR) 50 mg tablet, Take 1 tablet (50 mg total) by mouth every 12 (twelve) hours, Disp: 180 tablet, Rfl: 3    midodrine (PROAMATINE) 5 mg tablet, Take 1 tablet (5 mg total) by mouth 3 (three) times a week Take before dialysis, Disp: 30 tablet, Rfl: 2    omega-3-acid ethyl esters (LOVAZA) 1 g capsule, Take 2 capsules (2 g total) by mouth 2 (two) times a day Mail print prescription to pt, Disp: 360 capsule, Rfl: 3    OneTouch Ultra test strip, USE TO TEST BLOOD SUGAR 3 TIMES A DAY, Disp: 100 each, Rfl: 3    sevelamer carbonate (RENVELA) 800 mg tablet, Take 1 tablet (800 mg total) by mouth in the morning and 1 tablet (800 mg total) at noon and 1 tablet (800 mg total) in the evening  Take with meals  , Disp: 270 tablet, Rfl: 3    simvastatin (ZOCOR) 20 mg tablet, Take 1 tablet (20 mg total) by mouth daily at bedtime, Disp: 90 tablet, Rfl: 3    tamsulosin (FLOMAX) 0 4 mg, TAKE 1 CAPSULE BY MOUTH EVERY DAY WITH DINNER, Disp: 90 capsule, Rfl: 3    torsemide (DEMADEX) 20 mg tablet, Take 4 tablets (80 mg total) by mouth 4 (four) times a week Take on non-dialysis days only, Disp: 90 tablet, Rfl: 3    ondansetron (ZOFRAN) 8 mg tablet, Take 1 tablet (8 mg total) by mouth every 8 (eight) hours as needed for nausea or vomiting (Patient not taking: Reported on 7/27/2022), Disp: 20 tablet, Rfl: 3    Allergies   Allergen Reactions    Invokana [Canagliflozin] Other (See Comments)     Caused circulation problems    Lamisil [Terbinafine] Blisters     Wife states " His skin peeled from head to toe"    Other Swelling     Pomegranate - facial swelling, no swelling of tongue, esophagus  Adhesive tape   Latex Rash       Oncology History Overview Note   Patient with multiple comorbid conditions including CHF, advanced chronic kidney disease, diabetes mellitus, arthritis, sleep apnea, etc   He apparently had multiple colonoscopies 2021  He was found to have adenocarcinoma in the sigmoid region rising from tubular adenoma on 01/08/2021  He had a PET-CT scan on 02/19/2021 which showed uptake in the sigmoid colon otherwise no finding for hypermetabolic metastasis was found  The patient had 2 other colonoscopies and finally had his laparoscopic surgery on 10/21/2021 which showed showed residual adenocarcinoma moderately differentiated arising in the tubular adenoma with high-grade dysplasia, pT3 with 3/16 lymph node involvement  All margins were negative without obvious lymphovascular or perineural invasion      The patient subsequently on 11/24/2021 had a CT scan of the chest abdomen pelvis and IV contrast was given by mistake which show resulted in significant worsening of his kidney function with creatinine around 7  The he required hospitalization  His baseline creatinine level is around 3  The CT scan on 11/24/2021 showed 1 new cm nodule ground-glass density in the superior segment of the right lower lobe which was thought to be inflammatory versus neoplastic  There are 2 areas of heterogeneous attenuation also in the abdominal area of unknown significance  Follow-up in 3 months was suggested  PET/CT 1/4/22: IMPRESSION:  1   1 0 cm groundglass right lower lobe lung nodules seen on CT of chest dated 11/24/2021 is not definitively visualized on the current exam and likely has resolved although evaluation of the lung fields is limited secondary to respiratory motion   artifact  There is no focal FDG activity in the chest characteristic of hypermetabolic malignancy  Given limitations, short interval follow-up with CT of chest in 3 months is recommended to document resolution  2   Multifocal peritoneal hypermetabolic soft tissue foci involving the left mid abdomen and extending along the left paracolic gutter to the central lower abdomen/pelvis, most of which appears to be associated with internal attenuation and is most   suggestive of fat necrosis with underlying peritoneal carcinomatosis not excluded, particularly in the anterior left mid abdomen  As recommended previously, short interval follow-up with imaging in 3 months is recommended to ensure stability and exclude   developing peritoneal carcinomatosis  3   Moderate bilateral pleural effusions  4   Stable in size minimally FDG avid mediastinal/right hilar/left inguinal lymph nodes of uncertain clinical significance but most suggestive of a benign inflammatory process  Attention to these regions on follow-up scans is recommended  5   Air-fluid levels within the bilateral maxillary sinuses for which correlation is recommended to exclude acute sinusitis      He was started on the FOLFOX regimen without the oxaliplatin 1/2022  He was educated he will be educated extensively about the chemotherapy and the potential side effects including renal failure/hemodialysis risk  Patient did not tolerate the 1st cycle of chemotherapy well at all which resulted in hospitalization lesion and need for dialysis x2 treatments  He sought 2nd opinion in network with 1 of our colleagues Sheila Wills  Decision was made to trial 5 FU/Leucovorin at 50% of the usual dose (250mg/m2) via IV push weekly 5 weeks on with 1 week of a break which can be adjusted/titrated slowly according to patient's tolerance        Colon cancer ([facility] [address] )   10/23/2021 Initial Diagnosis    Colon cancer ([facility] [address] )     1/18/2022 - 1/20/2022 Chemotherapy    fluorouracil (ADRUCIL), 400 mg/m2 = 830 mg, Intravenous, Once, 1 of 1 cycle  Administration: 830 mg (1/18/2022)  leucovorin calcium IVPB, 828 mg, Intravenous, Once, 1 of 12 cycles  Administration: 800 mg (1/18/2022)  fluorouracil (ADRUCIL) ambulatory infusion Soln, 1,200 mg/m2/day = 4,970 mg, Intravenous, Over 46 hours, 1 of 12 cycles  Dose modification: 800 mg/m2/day (original dose 1,200 mg/m2/day, Cycle 2, Reason: Other (Must fill in a comment), Comment: Dr young )     3/7/2022 -  Chemotherapy    fluorouracil (ADRUCIL), 515 mg (62 5 % of original dose 400 mg/m2), Intravenous, Once, 3 of 12 cycles  Dose modification: 250 mg/m2 (original dose 400 mg/m2, Cycle 1, Reason: Dose modified as per discussion with consulting physician), 275 mg/m2 (original dose 400 mg/m2, Cycle 2, Reason: Max Dose Reached), 300 mg/m2 (original dose 400 mg/m2, Cycle 3, Reason: Dose modified as per discussion with consulting physician)  Administration: 500 mg (3/7/2022), 500 mg (3/14/2022), 500 mg (3/21/2022), 500 mg (3/28/2022), 500 mg (4/4/2022), 565 mg (4/18/2022), 555 mg (5/3/2022), 555 mg (5/10/2022), 555 mg (5/17/2022), 555 mg (5/24/2022), 605 mg (6/21/2022), 605 mg (6/28/2022), 605 mg (6/14/2022)  leucovorin calcium IVPB, 515 mg (62 5 % of original dose 400 mg/m2), Intravenous, Once, 3 of 12 cycles  Dose modification: 250 mg/m2 (original dose 400 mg/m2, Cycle 1, Reason: Dose modified as per discussion with consulting physician), 275 mg/m2 (original dose 400 mg/m2, Cycle 2, Reason: Max Dose Reached), 300 mg/m2 (original dose 400 mg/m2, Cycle 3, Reason: Dose modified as per discussion with consulting physician)  Administration: 500 mg (3/7/2022), 500 mg (3/14/2022), 500 mg (3/21/2022), 500 mg (3/28/2022), 500 mg (4/4/2022), 550 mg (4/18/2022), 550 mg (5/3/2022), 550 mg (5/10/2022), 550 mg (5/17/2022), 550 mg (5/24/2022), 600 mg (6/21/2022), 600 mg (6/28/2022), 600 mg (6/14/2022)         ROS:  07/27/22 Reviewed 12 systems: See symptoms in HPI  Presently no other neurological, cardiac, pulmonary, GI and  symptoms other than mentioned in HPI  Other symptoms are in HPI  No other symptoms like, no more fever and chills, no bleeding, bone pains, skin rash,  night sweats, arthritic symptoms,  numbness,  claudication   Has gait problem because of ulcer on left foot  Not unusually sensitive to heat or cold  Has some swelling and redness of the ankles especially left ankle area  No swollen glands  Patient is anxious  /70 (BP Location: Right arm, Patient Position: Sitting, Cuff Size: Adult)   Pulse 72   Temp 98 3 °F (36 8 °C) (Temporal)   Resp 18   Ht 5' 10" (1 778 m)   Wt 89 4 kg (197 lb)   SpO2 92%   BMI 28 27 kg/m²     Physical Exam:    Patient is alert and oriented  Patient is not in acute distress    There is no icterus , no oral thrush, no palpable neck mass,  lung fields clear to percussion and auscultation, regular heart rate, systolic murmur, pacemaker, soft and non tender abdomen, no palpable abdominal mass, no ascites, has some edema of ankles especially left ankle with some redness and dressing on left foot, no calf tenderness, no focal neurological deficit, no skin rash, no palpable lymphadenopathy in the neck and axillary areas, no clubbing  Patient is anxious  Performance status 3  Dialysis catheter right upper chest and fistula in left arm    IMAGING:  No orders to display       LABS:    Results for orders placed or performed during the hospital encounter of 07/07/22   Blood culture #1    Specimen: Arm, Right; Blood   Result Value Ref Range    Blood Culture Staphylococcus aureus (A)     Gram Stain Result Gram positive cocci in pairs, chains and clusters (A)        Susceptibility    Staphylococcus aureus - MEREDITH     Ampicillin ($$) 8 00 Resistant ug/ml     Cefazolin ($) <=4 00 Susceptible ug/ml     Clindamycin ($) <=0 25 Susceptible ug/ml     Erythromycin ($$$$) <=0 25 Susceptible ug/ml     Gentamicin ($$) <=1 Susceptible ug/ml     Oxacillin 0 50 Susceptible ug/ml     Tetracycline >8 Resistant ug/ml     Trimethoprim + Sulfamethoxazole ($$$) <=0 5/9 5 Susceptible ug/ml     Vancomycin ($) 1 00 Susceptible ug/ml   Blood culture #2    Specimen: Arm, Right; Blood   Result Value Ref Range    Blood Culture Staphylococcus aureus (A)     Gram Stain Result Gram positive cocci in pairs, chains and clusters (A)    FLU/RSV/COVID - if FLU/RSV clinically relevant    Specimen: Nasopharyngeal Swab; Nares   Result Value Ref Range    SARS-CoV-2 Negative Negative    INFLUENZA A PCR Negative Negative    INFLUENZA B PCR Negative Negative    RSV PCR Negative Negative   Blood Culture Identification Panel    Specimen: Arm, Right; Blood   Result Value Ref Range    Staphylococcus aureus Detected (A) Not Detected   MRSA culture    Specimen: Nose; Nares   Result Value Ref Range    MRSA Culture Only       No Methicillin Resistant Staphlyococcus aureus (MRSA) isolated   Blood culture    Specimen: Arm, Right; Blood   Result Value Ref Range    Blood Culture No Growth After 5 Days  Blood culture    Specimen: Arm, Right; Blood   Result Value Ref Range    Blood Culture No Growth After 5 Days      Culture, Catheter Tip Specimen: CVC; Catheter Tip   Result Value Ref Range    Catheter Tip Culture No growth    Blood culture    Specimen: Hand, Right; Blood   Result Value Ref Range    Blood Culture No Growth After 5 Days  Blood culture    Specimen: Central Venous Line; Blood   Result Value Ref Range    Blood Culture No Growth After 5 Days      Anaerobic culture and Gram stain    Specimen: Foot, Left; Tissue   Result Value Ref Range    Anaerobic Culture No growth    Culture, tissue and Gram stain    Specimen: Foot, Left; Tissue   Result Value Ref Range    Tissue Culture No growth     Gram Stain Result Rare Polys (A)     Gram Stain Result Rare Gram positive cocci in pairs (A)     Gram Stain Result No organisms seen (A)    CBC and differential   Result Value Ref Range    WBC 16 54 (H) 4 31 - 10 16 Thousand/uL    RBC 2 70 (L) 3 88 - 5 62 Million/uL    Hemoglobin 9 1 (L) 12 0 - 17 0 g/dL    Hematocrit 27 5 (L) 36 5 - 49 3 %     (H) 82 - 98 fL    MCH 33 7 26 8 - 34 3 pg    MCHC 33 1 31 4 - 37 4 g/dL    RDW 17 1 (H) 11 6 - 15 1 %    MPV 9 4 8 9 - 12 7 fL    Platelets 771 700 - 162 Thousands/uL    nRBC 0 /100 WBCs    Neutrophils Relative 89 (H) 43 - 75 %    Immat GRANS % 1 0 - 2 %    Lymphocytes Relative 4 (L) 14 - 44 %    Monocytes Relative 6 4 - 12 %    Eosinophils Relative 0 0 - 6 %    Basophils Relative 0 0 - 1 %    Neutrophils Absolute 14 74 (H) 1 85 - 7 62 Thousands/µL    Immature Grans Absolute 0 11 0 00 - 0 20 Thousand/uL    Lymphocytes Absolute 0 62 0 60 - 4 47 Thousands/µL    Monocytes Absolute 1 06 0 17 - 1 22 Thousand/µL    Eosinophils Absolute 0 00 0 00 - 0 61 Thousand/µL    Basophils Absolute 0 01 0 00 - 0 10 Thousands/µL   Comprehensive metabolic panel   Result Value Ref Range    Sodium 130 (L) 135 - 147 mmol/L    Potassium 4 8 3 5 - 5 3 mmol/L    Chloride 90 (L) 96 - 108 mmol/L    CO2 28 21 - 32 mmol/L    ANION GAP 12 4 - 13 mmol/L    BUN 37 (H) 5 - 25 mg/dL    Creatinine 3 53 (H) 0 60 - 1 30 mg/dL    Glucose 182 (H) 65 - 140 mg/dL    Calcium 8 9 8 4 - 10 2 mg/dL    AST 9 (L) 13 - 39 U/L    ALT 10 7 - 52 U/L    Alkaline Phosphatase 93 34 - 104 U/L    Total Protein 6 8 6 4 - 8 4 g/dL    Albumin 3 5 3 5 - 5 0 g/dL    Total Bilirubin 1 10 (H) 0 20 - 1 00 mg/dL    eGFR 16 ml/min/1 73sq m   Lactic acid   Result Value Ref Range    LACTIC ACID 3 2 (HH) 0 5 - 2 0 mmol/L   Procalcitonin   Result Value Ref Range    Procalcitonin 0 92 (H) <=0 25 ng/ml   Protime-INR   Result Value Ref Range    Protime 23 5 (H) 11 6 - 14 5 seconds    INR 2 15 (H) 0 84 - 1 19   APTT   Result Value Ref Range    PTT 38 (H) 23 - 37 seconds   UA w Reflex to Microscopic w Reflex to Culture    Specimen: Urine, Clean Catch   Result Value Ref Range    Color, UA Yellow Yellow, Straw    Clarity, UA Clear Hazy, Clear    Specific Gravity, UA 1 020 1 005 - 1 030    pH, UA 7 5 5 0, 5 5, 6 0, 6 5, 7 0, 7 5    Leukocytes, UA Negative Negative    Nitrite, UA Negative Negative    Protein, UA 2+ (A) Negative, Interference- unable to analyze mg/dl    Glucose, UA Trace (A) Negative mg/dl    Ketones, UA Negative Negative mg/dl    Urobilinogen, UA 0 2 0 2, 1 0 E U /dl E U /dl    Bilirubin, UA Negative Negative    Occult Blood, UA Trace-Intact (A) Negative   Lactic acid 2 Hours   Result Value Ref Range    LACTIC ACID 1 9 0 5 - 2 0 mmol/L   Procalcitonin, Next Day AM Collection   Result Value Ref Range    Procalcitonin 1 72 (H) <=0 25 ng/ml   Basic metabolic panel   Result Value Ref Range    Sodium 129 (L) 135 - 147 mmol/L    Potassium 4 3 3 5 - 5 3 mmol/L    Chloride 95 (L) 96 - 108 mmol/L    CO2 25 21 - 32 mmol/L    ANION GAP 9 4 - 13 mmol/L    BUN 45 (H) 5 - 25 mg/dL    Creatinine 4 17 (H) 0 60 - 1 30 mg/dL    Glucose 127 65 - 140 mg/dL    Calcium 8 4 8 4 - 10 2 mg/dL    eGFR 13 ml/min/1 73sq m   CBC and differential   Result Value Ref Range    WBC 16 91 (H) 4 31 - 10 16 Thousand/uL    RBC 2 47 (L) 3 88 - 5 62 Million/uL    Hemoglobin 8 2 (L) 12 0 - 17 0 g/dL    Hematocrit 25 4 (L) 36 5 - 49 3 %     (H) 82 - 98 fL    MCH 33 2 26 8 - 34 3 pg    MCHC 32 3 31 4 - 37 4 g/dL    RDW 17 2 (H) 11 6 - 15 1 %    MPV 9 1 8 9 - 12 7 fL    Platelets 231 268 - 147 Thousands/uL   Magnesium   Result Value Ref Range    Magnesium 1 8 (L) 1 9 - 2 7 mg/dL   Phosphorus   Result Value Ref Range    Phosphorus 3 5 2 3 - 4 1 mg/dL   Albumin   Result Value Ref Range    Albumin 3 0 (L) 3 5 - 5 0 g/dL   CBC and differential   Result Value Ref Range    WBC 14 95 (H) 4 31 - 10 16 Thousand/uL    RBC 2 69 (L) 3 88 - 5 62 Million/uL    Hemoglobin 9 0 (L) 12 0 - 17 0 g/dL    Hematocrit 27 5 (L) 36 5 - 49 3 %     (H) 82 - 98 fL    MCH 33 5 26 8 - 34 3 pg    MCHC 32 7 31 4 - 37 4 g/dL    RDW 17 0 (H) 11 6 - 15 1 %    MPV 9 6 8 9 - 12 7 fL    Platelets [phone] - 092 Thousands/uL    nRBC 0 /100 WBCs    Neutrophils Relative 88 (H) 43 - 75 %    Immat GRANS % 1 0 - 2 %    Lymphocytes Relative 5 (L) 14 - 44 %    Monocytes Relative 5 4 - 12 %    Eosinophils Relative 1 0 - 6 %    Basophils Relative 0 0 - 1 %    Neutrophils Absolute 13 33 (H) 1 85 - 7 62 Thousands/µL    Immature Grans Absolute 0 12 0 00 - 0 20 Thousand/uL    Lymphocytes Absolute 0 70 0 60 - 4 47 Thousands/µL    Monocytes Absolute 0 69 0 17 - 1 22 Thousand/µL    Eosinophils Absolute 0 09 0 00 - 0 61 Thousand/µL    Basophils Absolute 0 02 0 00 - 0 10 Thousands/µL   Basic metabolic panel   Result Value Ref Range    Sodium 131 (L) 135 - 147 mmol/L    Potassium 4 5 3 5 - 5 3 mmol/L    Chloride 92 (L) 96 - 108 mmol/L    CO2 26 21 - 32 mmol/L    ANION GAP 13 4 - 13 mmol/L    BUN 36 (H) 5 - 25 mg/dL    Creatinine 3 46 (H) 0 60 - 1 30 mg/dL    Glucose 138 65 - 140 mg/dL    Calcium 8 8 8 4 - 10 2 mg/dL    eGFR 17 ml/min/1 73sq m   CBC and differential   Result Value Ref Range    WBC 14 79 (H) 4 31 - 10 16 Thousand/uL    RBC 2 51 (L) 3 88 - 5 62 Million/uL    Hemoglobin 8 2 (L) 12 0 - 17 0 g/dL    Hematocrit 25 5 (L) 36 5 - 49 3 %     (H) 82 - 98 fL    MCH 32 7 26 8 - 34 3 pg    MCHC 32 2 31 4 - 37 4 g/dL    RDW 17 1 (H) 11 6 - 15 1 %    MPV 10 4 8 9 - 12 7 fL    Platelets [phone] - 454 Thousands/uL    nRBC 1 /100 WBCs    Neutrophils Relative 80 (H) 43 - 75 %    Immat GRANS % 1 0 - 2 %    Lymphocytes Relative 9 (L) 14 - 44 %    Monocytes Relative 8 4 - 12 %    Eosinophils Relative 2 0 - 6 %    Basophils Relative 0 0 - 1 %    Neutrophils Absolute 11 83 (H) 1 85 - 7 62 Thousands/µL    Immature Grans Absolute 0 19 0 00 - 0 20 Thousand/uL    Lymphocytes Absolute 1 25 0 60 - 4 47 Thousands/µL    Monocytes Absolute 1 13 0 17 - 1 22 Thousand/µL    Eosinophils Absolute 0 36 0 00 - 0 61 Thousand/µL    Basophils Absolute 0 03 0 00 - 0 10 Thousands/µL   Basic metabolic panel   Result Value Ref Range    Sodium 128 (L) 135 - 147 mmol/L    Potassium 3 7 3 5 - 5 3 mmol/L    Chloride 93 (L) 96 - 108 mmol/L    CO2 26 21 - 32 mmol/L    ANION GAP 9 4 - 13 mmol/L    BUN 55 (H) 5 - 25 mg/dL    Creatinine 4 06 (H) 0 60 - 1 30 mg/dL    Glucose 136 65 - 140 mg/dL    Calcium 8 2 (L) 8 4 - 10 2 mg/dL    eGFR 14 ml/min/1 73sq m   Magnesium   Result Value Ref Range    Magnesium 1 9 1 9 - 2 7 mg/dL   Phosphorus   Result Value Ref Range    Phosphorus 3 1 2 3 - 4 1 mg/dL   Vancomycin, random   Result Value Ref Range    Vancomycin Rm 11 5 10 0 - 20 0 ug/mL   CBC and differential   Result Value Ref Range    WBC 12 75 (H) 4 31 - 10 16 Thousand/uL    RBC 2 55 (L) 3 88 - 5 62 Million/uL    Hemoglobin 8 4 (L) 12 0 - 17 0 g/dL    Hematocrit 25 7 (L) 36 5 - 49 3 %     (H) 82 - 98 fL    MCH 32 9 26 8 - 34 3 pg    MCHC 32 7 31 4 - 37 4 g/dL    RDW 16 8 (H) 11 6 - 15 1 %    MPV 10 4 8 9 - 12 7 fL    Platelets [phone] - 175 Thousands/uL    nRBC 0 /100 WBCs    Neutrophils Relative 76 (H) 43 - 75 %    Immat GRANS % 2 0 - 2 %    Lymphocytes Relative 8 (L) 14 - 44 %    Monocytes Relative 9 4 - 12 %    Eosinophils Relative 5 0 - 6 %    Basophils Relative 0 0 - 1 %    Neutrophils Absolute 9 73 (H) 1 85 - 7 62 Thousands/µL    Immature Grans Absolute 0 21 (H) 0 00 - 0 20 Thousand/uL    Lymphocytes Absolute 0 99 0 60 - 4 47 Thousands/µL    Monocytes Absolute 1 10 0 17 - 1 22 Thousand/µL    Eosinophils Absolute 0 68 (H) 0 00 - 0 61 Thousand/µL    Basophils Absolute 0 04 0 00 - 0 10 Thousands/µL   Basic metabolic panel   Result Value Ref Range    Sodium 131 (L) 135 - 147 mmol/L    Potassium 3 7 3 5 - 5 3 mmol/L    Chloride 95 (L) 96 - 108 mmol/L    CO2 26 21 - 32 mmol/L    ANION GAP 10 4 - 13 mmol/L    BUN 62 (H) 5 - 25 mg/dL    Creatinine 3 85 (H) 0 60 - 1 30 mg/dL    Glucose 138 65 - 140 mg/dL    Calcium 8 3 (L) 8 4 - 10 2 mg/dL    eGFR 15 ml/min/1 73sq m   Magnesium   Result Value Ref Range    Magnesium 1 9 1 9 - 2 7 mg/dL   Phosphorus   Result Value Ref Range    Phosphorus 3 1 2 3 - 4 1 mg/dL   Urine Microscopic   Result Value Ref Range    RBC, UA 0-1 None Seen, 0-1, 1-2, 2-4, 0-5 /hpf    WBC, UA 0-1 None Seen, 0-1, 1-2, 0-5, 2-4 /hpf    Epithelial Cells None Seen None Seen, Occasional /hpf    Bacteria, UA None Seen None Seen, Occasional /hpf   CBC and differential   Result Value Ref Range    WBC 13 00 (H) 4 31 - 10 16 Thousand/uL    RBC 2 55 (L) 3 88 - 5 62 Million/uL    Hemoglobin 8 3 (L) 12 0 - 17 0 g/dL    Hematocrit 26 2 (L) 36 5 - 49 3 %     (H) 82 - 98 fL    MCH 32 5 26 8 - 34 3 pg    MCHC 31 7 31 4 - 37 4 g/dL    RDW 17 2 (H) 11 6 - 15 1 %    MPV 10 5 8 9 - 12 7 fL    Platelets 594 227 - 548 Thousands/uL    nRBC 0 /100 WBCs    Neutrophils Relative 79 (H) 43 - 75 %    Immat GRANS % 2 0 - 2 %    Lymphocytes Relative 8 (L) 14 - 44 %    Monocytes Relative 9 4 - 12 %    Eosinophils Relative 2 0 - 6 %    Basophils Relative 0 0 - 1 %    Neutrophils Absolute 10 24 (H) 1 85 - 7 62 Thousands/µL    Immature Grans Absolute 0 28 (H) 0 00 - 0 20 Thousand/uL    Lymphocytes Absolute 1 06 0 60 - 4 47 Thousands/µL    Monocytes Absolute 1 15 0 17 - 1 22 Thousand/µL    Eosinophils Absolute 0 24 0 00 - 0 61 Thousand/µL Basophils Absolute 0 03 0 00 - 0 10 Thousands/µL   Basic metabolic panel   Result Value Ref Range    Sodium 131 (L) 135 - 147 mmol/L    Potassium 3 9 3 5 - 5 3 mmol/L    Chloride 95 (L) 96 - 108 mmol/L    CO2 28 21 - 32 mmol/L    ANION GAP 8 4 - 13 mmol/L    BUN 34 (H) 5 - 25 mg/dL    Creatinine 2 77 (H) 0 60 - 1 30 mg/dL    Glucose 140 65 - 140 mg/dL    Calcium 8 1 (L) 8 4 - 10 2 mg/dL    eGFR 22 ml/min/1 73sq m   Magnesium   Result Value Ref Range    Magnesium 1 9 1 9 - 2 7 mg/dL   Phosphorus   Result Value Ref Range    Phosphorus 1 8 (L) 2 3 - 4 1 mg/dL   Protime-INR   Result Value Ref Range    Protime 23 8 (H) 11 6 - 14 5 seconds    INR 2 13 (H) 0 84 - 1 19   CBC and differential   Result Value Ref Range    WBC 12 96 (H) 4 31 - 10 16 Thousand/uL    RBC 2 45 (L) 3 88 - 5 62 Million/uL    Hemoglobin 8 0 (L) 12 0 - 17 0 g/dL    Hematocrit 25 1 (L) 36 5 - 49 3 %     (H) 82 - 98 fL    MCH 32 7 26 8 - 34 3 pg    MCHC 31 9 31 4 - 37 4 g/dL    RDW 17 5 (H) 11 6 - 15 1 %    MPV 10 0 8 9 - 12 7 fL    Platelets 552 885 - 320 Thousands/uL    nRBC 0 /100 WBCs    Neutrophils Relative 79 (H) 43 - 75 %    Immat GRANS % 2 0 - 2 %    Lymphocytes Relative 8 (L) 14 - 44 %    Monocytes Relative 8 4 - 12 %    Eosinophils Relative 3 0 - 6 %    Basophils Relative 0 0 - 1 %    Neutrophils Absolute 10 16 (H) 1 85 - 7 62 Thousands/µL    Immature Grans Absolute 0 29 (H) 0 00 - 0 20 Thousand/uL    Lymphocytes Absolute 1 04 0 60 - 4 47 Thousands/µL    Monocytes Absolute 1 05 0 17 - 1 22 Thousand/µL    Eosinophils Absolute 0 40 0 00 - 0 61 Thousand/µL    Basophils Absolute 0 02 0 00 - 0 10 Thousands/µL   Basic metabolic panel   Result Value Ref Range    Sodium 133 (L) 135 - 147 mmol/L    Potassium 3 7 3 5 - 5 3 mmol/L    Chloride 97 96 - 108 mmol/L    CO2 27 21 - 32 mmol/L    ANION GAP 9 4 - 13 mmol/L    BUN 40 (H) 5 - 25 mg/dL    Creatinine 3 13 (H) 0 60 - 1 30 mg/dL    Glucose 111 65 - 140 mg/dL    Calcium 8 5 8 4 - 10 2 mg/dL eGFR 19 ml/min/1 73sq m   Magnesium   Result Value Ref Range    Magnesium 1 9 1 9 - 2 7 mg/dL   Phosphorus   Result Value Ref Range    Phosphorus 2 7 2 3 - 4 1 mg/dL   CBC and differential   Result Value Ref Range    WBC 15 69 (H) 4 31 - 10 16 Thousand/uL    RBC 2 84 (L) 3 88 - 5 62 Million/uL    Hemoglobin 9 3 (L) 12 0 - 17 0 g/dL    Hematocrit 29 1 (L) 36 5 - 49 3 %     (H) 82 - 98 fL    MCH 32 7 26 8 - 34 3 pg    MCHC 32 0 31 4 - 37 4 g/dL    RDW 17 3 (H) 11 6 - 15 1 %    MPV 10 4 8 9 - 12 7 fL    Platelets 320 663 - 672 Thousands/uL    nRBC 0 /100 WBCs    Neutrophils Relative 84 (H) 43 - 75 %    Immat GRANS % 2 0 - 2 %    Lymphocytes Relative 5 (L) 14 - 44 %    Monocytes Relative 7 4 - 12 %    Eosinophils Relative 2 0 - 6 %    Basophils Relative 0 0 - 1 %    Neutrophils Absolute 13 17 (H) 1 85 - 7 62 Thousands/µL    Immature Grans Absolute 0 30 (H) 0 00 - 0 20 Thousand/uL    Lymphocytes Absolute 0 83 0 60 - 4 47 Thousands/µL    Monocytes Absolute 1 06 0 17 - 1 22 Thousand/µL    Eosinophils Absolute 0 30 0 00 - 0 61 Thousand/µL    Basophils Absolute 0 03 0 00 - 0 10 Thousands/µL   CBC and differential   Result Value Ref Range    WBC 12 62 (H) 4 31 - 10 16 Thousand/uL    RBC 2 49 (L) 3 88 - 5 62 Million/uL    Hemoglobin 8 0 (L) 12 0 - 17 0 g/dL    Hematocrit 25 9 (L) 36 5 - 49 3 %     (H) 82 - 98 fL    MCH 32 1 26 8 - 34 3 pg    MCHC 30 9 (L) 31 4 - 37 4 g/dL    RDW 17 5 (H) 11 6 - 15 1 %    MPV 10 1 8 9 - 12 7 fL    Platelets 917 347 - 254 Thousands/uL    nRBC 0 /100 WBCs    Neutrophils Relative 79 (H) 43 - 75 %    Immat GRANS % 2 0 - 2 %    Lymphocytes Relative 8 (L) 14 - 44 %    Monocytes Relative 8 4 - 12 %    Eosinophils Relative 3 0 - 6 %    Basophils Relative 0 0 - 1 %    Neutrophils Absolute 9 90 (H) 1 85 - 7 62 Thousands/µL    Immature Grans Absolute 0 30 (H) 0 00 - 0 20 Thousand/uL    Lymphocytes Absolute 0 98 0 60 - 4 47 Thousands/µL    Monocytes Absolute 1 05 0 17 - 1 22 Thousand/µL Eosinophils Absolute 0 36 0 00 - 0 61 Thousand/µL    Basophils Absolute 0 03 0 00 - 0 10 Thousands/µL   Basic metabolic panel   Result Value Ref Range    Sodium 131 (L) 135 - 147 mmol/L    Potassium 4 3 3 5 - 5 3 mmol/L    Chloride 96 96 - 108 mmol/L    CO2 28 21 - 32 mmol/L    ANION GAP 7 4 - 13 mmol/L    BUN 21 5 - 25 mg/dL    Creatinine 2 34 (H) 0 60 - 1 30 mg/dL    Glucose 107 65 - 140 mg/dL    Calcium 8 1 (L) 8 4 - 10 2 mg/dL    eGFR 27 ml/min/1 73sq m   Magnesium   Result Value Ref Range    Magnesium 2 0 1 9 - 2 7 mg/dL   Phosphorus   Result Value Ref Range    Phosphorus 2 1 (L) 2 3 - 4 1 mg/dL   Vancomycin, random   Result Value Ref Range    Vancomycin Rm <5 0 (L) 10 0 - 20 0 ug/mL   CBC and differential   Result Value Ref Range    WBC 11 63 (H) 4 31 - 10 16 Thousand/uL    RBC 2 45 (L) 3 88 - 5 62 Million/uL    Hemoglobin 8 1 (L) 12 0 - 17 0 g/dL    Hematocrit 25 3 (L) 36 5 - 49 3 %     (H) 82 - 98 fL    MCH 33 1 26 8 - 34 3 pg    MCHC 32 0 31 4 - 37 4 g/dL    RDW 17 1 (H) 11 6 - 15 1 %    MPV 9 9 8 9 - 12 7 fL    Platelets 790 232 - 277 Thousands/uL    nRBC 0 /100 WBCs    Neutrophils Relative 77 (H) 43 - 75 %    Immat GRANS % 2 0 - 2 %    Lymphocytes Relative 9 (L) 14 - 44 %    Monocytes Relative 9 4 - 12 %    Eosinophils Relative 3 0 - 6 %    Basophils Relative 0 0 - 1 %    Neutrophils Absolute 8 97 (H) 1 85 - 7 62 Thousands/µL    Immature Grans Absolute 0 21 (H) 0 00 - 0 20 Thousand/uL    Lymphocytes Absolute 1 02 0 60 - 4 47 Thousands/µL    Monocytes Absolute 1 06 0 17 - 1 22 Thousand/µL    Eosinophils Absolute 0 35 0 00 - 0 61 Thousand/µL    Basophils Absolute 0 02 0 00 - 0 10 Thousands/µL   Basic metabolic panel   Result Value Ref Range    Sodium 133 (L) 135 - 147 mmol/L    Potassium 3 9 3 5 - 5 3 mmol/L    Chloride 98 96 - 108 mmol/L    CO2 27 21 - 32 mmol/L    ANION GAP 8 4 - 13 mmol/L    BUN 34 (H) 5 - 25 mg/dL    Creatinine 3 05 (H) 0 60 - 1 30 mg/dL    Glucose 118 65 - 140 mg/dL    Calcium 8 5 8 4 - 10 2 mg/dL    eGFR 19 ml/min/1 73sq m   Magnesium   Result Value Ref Range    Magnesium 2 0 1 9 - 2 7 mg/dL   Phosphorus   Result Value Ref Range    Phosphorus 2 8 2 3 - 4 1 mg/dL   CBC and differential   Result Value Ref Range    WBC 12 97 (H) 4 31 - 10 16 Thousand/uL    RBC 2 78 (L) 3 88 - 5 62 Million/uL    Hemoglobin 8 9 (L) 12 0 - 17 0 g/dL    Hematocrit 28 8 (L) 36 5 - 49 3 %     (H) 82 - 98 fL    MCH 32 0 26 8 - 34 3 pg    MCHC 30 9 (L) 31 4 - 37 4 g/dL    RDW 17 2 (H) 11 6 - 15 1 %    MPV 9 9 8 9 - 12 7 fL    Platelets 958 363 - 231 Thousands/uL    nRBC 0 /100 WBCs    Neutrophils Relative 77 (H) 43 - 75 %    Immat GRANS % 1 0 - 2 %    Lymphocytes Relative 9 (L) 14 - 44 %    Monocytes Relative 10 4 - 12 %    Eosinophils Relative 2 0 - 6 %    Basophils Relative 1 0 - 1 %    Neutrophils Absolute 10 04 (H) 1 85 - 7 62 Thousands/µL    Immature Grans Absolute 0 17 0 00 - 0 20 Thousand/uL    Lymphocytes Absolute 1 14 0 60 - 4 47 Thousands/µL    Monocytes Absolute 1 29 (H) 0 17 - 1 22 Thousand/µL    Eosinophils Absolute 0 27 0 00 - 0 61 Thousand/µL    Basophils Absolute 0 06 0 00 - 0 10 Thousands/µL   Basic metabolic panel   Result Value Ref Range    Sodium 134 (L) 135 - 147 mmol/L    Potassium 4 3 3 5 - 5 3 mmol/L    Chloride 97 96 - 108 mmol/L    CO2 27 21 - 32 mmol/L    ANION GAP 10 4 - 13 mmol/L    BUN 27 (H) 5 - 25 mg/dL    Creatinine 2 84 (H) 0 60 - 1 30 mg/dL    Glucose 109 65 - 140 mg/dL    Calcium 8 6 8 4 - 10 2 mg/dL    eGFR 21 ml/min/1 73sq m   Magnesium   Result Value Ref Range    Magnesium 2 0 1 9 - 2 7 mg/dL   Phosphorus   Result Value Ref Range    Phosphorus 3 7 2 3 - 4 1 mg/dL   CBC and differential   Result Value Ref Range    WBC 9 73 4 31 - 10 16 Thousand/uL    RBC 2 52 (L) 3 88 - 5 62 Million/uL    Hemoglobin 8 1 (L) 12 0 - 17 0 g/dL    Hematocrit 27 0 (L) 36 5 - 49 3 %     (H) 82 - 98 fL    MCH 32 1 26 8 - 34 3 pg    MCHC 30 0 (L) 31 4 - 37 4 g/dL    RDW 17 1 (H) 11 6 - 15 1 %    MPV 9 4 8 9 - 12 7 fL    Platelets 721 181 - 874 Thousands/uL    nRBC 0 /100 WBCs    Neutrophils Relative 76 (H) 43 - 75 %    Immat GRANS % 1 0 - 2 %    Lymphocytes Relative 10 (L) 14 - 44 %    Monocytes Relative 10 4 - 12 %    Eosinophils Relative 3 0 - 6 %    Basophils Relative 0 0 - 1 %    Neutrophils Absolute 7 39 1 85 - 7 62 Thousands/µL    Immature Grans Absolute 0 06 0 00 - 0 20 Thousand/uL    Lymphocytes Absolute 0 97 0 60 - 4 47 Thousands/µL    Monocytes Absolute 0 98 0 17 - 1 22 Thousand/µL    Eosinophils Absolute 0 30 0 00 - 0 61 Thousand/µL    Basophils Absolute 0 03 0 00 - 0 10 Thousands/µL   Basic metabolic panel   Result Value Ref Range    Sodium 136 135 - 147 mmol/L    Potassium 4 0 3 5 - 5 3 mmol/L    Chloride 100 96 - 108 mmol/L    CO2 27 21 - 32 mmol/L    ANION GAP 9 4 - 13 mmol/L    BUN 24 5 - 25 mg/dL    Creatinine 2 69 (H) 0 60 - 1 30 mg/dL    Glucose 101 65 - 140 mg/dL    Calcium 8 3 (L) 8 4 - 10 2 mg/dL    eGFR 23 ml/min/1 73sq m   Magnesium   Result Value Ref Range    Magnesium 2 1 1 9 - 2 7 mg/dL   Phosphorus   Result Value Ref Range    Phosphorus 3 8 2 3 - 4 1 mg/dL   ECG 12 lead   Result Value Ref Range    Ventricular Rate 62 BPM    Atrial Rate 267 BPM    NH Interval  ms    QRSD Interval 168 ms    QT Interval 448 ms    QTC Interval 454 ms    P Axis  degrees    QRS Axis -73 degrees    T Wave Axis 80 degrees   ECG 12 lead   Result Value Ref Range    Ventricular Rate 62 BPM    Atrial Rate 248 BPM    NH Interval  ms    QRSD Interval 180 ms    QT Interval 524 ms    QTC Interval 531 ms    P [place] 79 degrees    QRS Axis -64 degrees    T Wave Axis 74 degrees   Echo follow up/limited w/ contrast if indicated   Result Value Ref Range    AV area peak fariha 1 7 cm2    AV peak gradient 79 mmHg    LA size 4 6 cm    Aortic valve mean velocity 21 10 m/s    Pulmonary regurgitation late diastolic velocity 3 39 m/s    Triscuspid Valve Regurgitation Peak Gradient 48 0 mmHg    Tricuspid valve peak regurgitation velocity 3 45 m/s    LVPWd 1 30 cm    TR Peak Ruben 3 5 m/s    IVSd 6 97 cm    LV DIASTOLIC VOLUME (MOD BIPLANE) 2D 81 mL    LEFT VENTRICLE SYSTOLIC VOLUME (MOD BIPLANE) 2D 31 mL    Left ventricular stroke volume (2D) 49 00 mL    AV Deceleration Time 1,618 ms    LVIDd 4 20 cm    IVS 1 3 cm    LVIDS 2 90 cm    FS 31 28 - 44 %    Asc Ao 3 6 cm    Ao root 3 50 cm    LVOT mn grad 4 0 mmHg    AV area by cont VTI 1 6 cm2    AV regurgitation pressure 1/2 time 469 ms    AV mean gradient 19 mmHg    AV LVOT peak gradient 8 mmHg    MV mean gradient antegrade 6 (A) mmHg    MV valve area p 1/2 method 2 10 cm2    E wave deceleration time 363 ms    LVOT diameter 2 1 cm    LVOT peak ruben 1 44 m/s    LVOT peak VTI 29 75 cm    Ao peak ruben retrograde 2 85 m/s    Ao VTI 63 3 cm    LVOT stroke volume 102 99 cm3    AV peak gradient 33 mmHg    MV peak gradient antegrade 21 mmHg    MV Peak E Ruben 198 cm/s    MV VTI 65 49 cm    MV Peak A Ruben 0 58 m/s    MV stenosis pressure 1/2 time 105 ms    LVOT SI 50 50 ml/m2    LVSV, 2D 49 mL    LVOT area 3 46 cm2    AV Velocity Ratio 0 51     AV valve area 1 63 cm2    MV valve area by continuity eq 1 57 cm2    LV EF 65     Est  RA pres 15 0 mmHg    Right Ventricular Peak Systolic Pressure 84 97 mmHg   Fingerstick Glucose (POCT)   Result Value Ref Range    POC Glucose 232 (H) 65 - 140 mg/dl   Fingerstick Glucose (POCT)   Result Value Ref Range    POC Glucose 177 (H) 65 - 140 mg/dl   Fingerstick Glucose (POCT)   Result Value Ref Range    POC Glucose 131 65 - 140 mg/dl   Fingerstick Glucose (POCT)   Result Value Ref Range    POC Glucose 131 65 - 140 mg/dl   Fingerstick Glucose (POCT)   Result Value Ref Range    POC Glucose 62 (L) 65 - 140 mg/dl   Fingerstick Glucose (POCT)   Result Value Ref Range    POC Glucose 161 (H) 65 - 140 mg/dl   Fingerstick Glucose (POCT)   Result Value Ref Range    POC Glucose 130 65 - 140 mg/dl   Fingerstick Glucose (POCT)   Result Value Ref Range    POC Glucose 206 (H) 65 - 140 mg/dl   Fingerstick Glucose (POCT)   Result Value Ref Range    POC Glucose 136 65 - 140 mg/dl   Fingerstick Glucose (POCT)   Result Value Ref Range    POC Glucose 185 (H) 65 - 140 mg/dl   Fingerstick Glucose (POCT)   Result Value Ref Range    POC Glucose 238 (H) 65 - 140 mg/dl   Fingerstick Glucose (POCT)   Result Value Ref Range    POC Glucose 217 (H) 65 - 140 mg/dl   Fingerstick Glucose (POCT)   Result Value Ref Range    POC Glucose 139 65 - 140 mg/dl   Fingerstick Glucose (POCT)   Result Value Ref Range    POC Glucose 303 (H) 65 - 140 mg/dl   Fingerstick Glucose (POCT)   Result Value Ref Range    POC Glucose 268 (H) 65 - 140 mg/dl   Fingerstick Glucose (POCT)   Result Value Ref Range    POC Glucose 212 (H) 65 - 140 mg/dl   Fingerstick Glucose (POCT)   Result Value Ref Range    POC Glucose 157 (H) 65 - 140 mg/dl   Fingerstick Glucose (POCT)   Result Value Ref Range    POC Glucose 174 (H) 65 - 140 mg/dl   Fingerstick Glucose (POCT)   Result Value Ref Range    POC Glucose 231 (H) 65 - 140 mg/dl   Fingerstick Glucose (POCT)   Result Value Ref Range    POC Glucose 292 (H) 65 - 140 mg/dl   Fingerstick Glucose (POCT)   Result Value Ref Range    POC Glucose 143 (H) 65 - 140 mg/dl   Fingerstick Glucose (POCT)   Result Value Ref Range    POC Glucose 166 (H) 65 - 140 mg/dl   Fingerstick Glucose (POCT)   Result Value Ref Range    POC Glucose 166 (H) 65 - 140 mg/dl   Fingerstick Glucose (POCT)   Result Value Ref Range    POC Glucose 237 (H) 65 - 140 mg/dl   Fingerstick Glucose (POCT)   Result Value Ref Range    POC Glucose 116 65 - 140 mg/dl   Fingerstick Glucose (POCT)   Result Value Ref Range    POC Glucose 283 (H) 65 - 140 mg/dl   Fingerstick Glucose (POCT)   Result Value Ref Range    POC Glucose 194 (H) 65 - 140 mg/dl   Fingerstick Glucose (POCT)   Result Value Ref Range    POC Glucose 189 (H) 65 - 140 mg/dl   Fingerstick Glucose (POCT)   Result Value Ref Range    POC Glucose 124 65 - 140 mg/dl   Fingerstick Glucose (POCT)   Result Value Ref Range    POC Glucose 276 (H) 65 - 140 mg/dl   Fingerstick Glucose (POCT)   Result Value Ref Range    POC Glucose 222 (H) 65 - 140 mg/dl   Fingerstick Glucose (POCT)   Result Value Ref Range    POC Glucose 213 (H) 65 - 140 mg/dl   Fingerstick Glucose (POCT)   Result Value Ref Range    POC Glucose 158 (H) 65 - 140 mg/dl   Fingerstick Glucose (POCT)   Result Value Ref Range    POC Glucose 138 65 - 140 mg/dl   Fingerstick Glucose (POCT)   Result Value Ref Range    POC Glucose 126 65 - 140 mg/dl   Fingerstick Glucose (POCT)   Result Value Ref Range    POC Glucose 188 (H) 65 - 140 mg/dl   Fingerstick Glucose (POCT)   Result Value Ref Range    POC Glucose 127 65 - 140 mg/dl   Fingerstick Glucose (POCT)   Result Value Ref Range    POC Glucose 111 65 - 140 mg/dl   Fingerstick Glucose (POCT)   Result Value Ref Range    POC Glucose 204 (H) 65 - 140 mg/dl   Fingerstick Glucose (POCT)   Result Value Ref Range    POC Glucose 192 (H) 65 - 140 mg/dl   Fingerstick Glucose (POCT)   Result Value Ref Range    POC Glucose 117 65 - 140 mg/dl   Manual Differential(PHLEBS Do Not Order)   Result Value Ref Range    Segmented % 90 (H) 43 - 75 %    Bands % 3 0 - 8 %    Lymphocytes % 2 (L) 14 - 44 %    Monocytes % 5 4 - 12 %    Eosinophils, % 0 0 - 6 %    Basophils % 0 0 - 1 %    Absolute Neutrophils 15 73 (H) 1 85 - 7 62 Thousand/uL    Lymphocytes Absolute 0 34 (L) 0 60 - 4 47 Thousand/uL    Monocytes Absolute 0 85 0 00 - 1 22 Thousand/uL    Eosinophils Absolute 0 00 0 00 - 0 40 Thousand/uL    Basophils Absolute 0 00 0 00 - 0 10 Thousand/uL    Total Counted      RBC Morphology Present     Anisocytosis Present     Macrocytes Present     Platelet Estimate Adequate Adequate     *Note: Due to a large number of results and/or encounters for the requested time period, some results have not been displayed  A complete set of results can be found in Results Review       Labs, Imaging, & Other studies:   All pertinent labs and imaging studies were personally reviewed    Lab Results   Component Value Date     (L) 04/10/2017    K 4 0 07/17/2022     07/17/2022    CO2 27 07/17/2022    BUN 24 07/17/2022    CREATININE 2 69 (H) 07/17/2022    GLUCOSE 142 (H) 11/01/2018    GLUF 309 (H) 07/02/2022    CALCIUM 8 3 (L) 07/17/2022    CORRECTEDCA 9 6 07/02/2022    AST 9 (L) 07/07/2022    ALT 10 07/07/2022    ALKPHOS 93 07/07/2022    PROT 6 9 04/10/2017    BILITOT 0 7 04/10/2017    EGFR 23 07/17/2022     Lab Results   Component Value Date    WBC 9 73 07/17/2022    HGB 8 1 (L) 07/17/2022    HCT 27 0 (L) 07/17/2022     (H) 07/17/2022     07/17/2022       Reviewed test results, inpatient and outpatient records and discussed with patient  Assessment and plan: See diagnoses, orders and instructions below  Patient is here with his wife  Patient has history of 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 and on 10/21/2021 he had left anterior resection  3 of 16 lymph nodes showed metastatic disease  There was question of disease in the abdomen on PET-CT scan in January 22, stage III or 4  Patient has other comorbid conditions and because of that there was some delay in starting FOLFOX chemotherapy  He had problem tolerating chemotherapy and was hospitalized no after 1 treatment because of nausea, vomiting, dehydration and renal failure  Treatment was then changed to reduced dose of 5FU and Leucovorin and he was tolerating treatments without much problem  He was recently hospitalized earlier this month with Gram-positive septicemia and he is on Ancef  Infection probably started in his left foot  He has wound at the bottom of left foot that had been debrided  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  Port-A-Cath was removed  He has dialysis catheter in right upper chest and has dialysis fistula in left arm  He has been undergoing hemodialysis 3 days a week  He still has some redness and swelling of left foot and ankle  Pacemaker was not removed  Patient had PET-CT scan on 01/04/2022 and there was question of abdominal metastatic disease verses fat necrosis  Patient will have follow-up PET-CT scan to see if treatment has been working  He has limited options of systemic therapy because of other comorbid conditions like diabetes with complications like neuropathy and diabetic left foot, COPD and CHF, peripheral arterial disease, atrial fibrillation, hypertension, end-stage renal disease and recent septicemia and others  Patient is not sure if he will like to continue chemotherapy or not  He has tiredness  No GI symptoms ,  Bowels are moving  No abdominal pain  No cardiac and pulmonary symptoms at rest   No  symptoms  No headache and seizures  No bone pains  No  bleeding  No skin rash  Appetite is fair    History of chronic ulcer of left foot and patient follows with a podiatrist  Patient was being treated by our group member Dr Agnes Newsome    Physical examination and test results are as recorded and discussed  He will have PET-CT scan and he will decide after that to continue on chemotherapy or not  If he has more disease on PET-CT scan he will decided against chemotherapy otherwise he could continue on low dose 5 FU with Leucovorin  I discussed this with patient and his wife and explained  Questions answered  He could follow with Dr Agnes Newsome but he will like to come back after PET scan to rediscuss the disease status and treatment options  He is not a candidate for oxaliplatin because of renal insufficiency  Also he is not a candidate for Xeloda because of renal insufficiency  They understand that there is no guarantee that patient will not get sepsis or get sick again with chemo     We also discussed quality of life  Discussed hospice briefly  I discussed all this with patient and his wife in detail  Questions answered      Diet and activities per patient's primary physician and nephrologist     Patient needs assistance in his care  He will continue follow with primary physician and other consultants  Discussed precautions against coronavirus  1  Malignant neoplasm of colon, unspecified part of colon ([facility] )    - NM PET CT skull base to mid thigh; Future    2  End stage renal disease on dialysis ([facility] )      3  Chronic painful diabetic neuropathy ([facility] )      4  Diabetic infection of left foot ([facility] )      5  Diabetic ulcer of toe of left foot associated with type 2 diabetes mellitus, unspecified ulcer stage ([facility] )      6  Type 2 diabetes mellitus with diabetic neuropathy, without long-term current use of insulin ([place] [id number] )    7  Type 2 diabetes mellitus with stage 4 chronic kidney disease, with long-term current use of insulin ([facility] )      8  Chronic diastolic (congestive) heart failure (HCC)      9  Aortic valve stenosis, nonrheumatic      10  Peripheral arterial disease ([facility] )      11  Persistent atrial fibrillation ([facility] )      12  S/P amputation of lesser toe, left ([facility] )      13  Amputation of left great toe ([facility] )      14  S/P arteriovenous (AV) fistula creation      15  Staphylococcus aureus bacteremia      16  Primary hypertension      17  Abdominal carcinomatosis ([facility] )    - NM PET CT skull base to mid thigh; Future      Ordered PET scan  Follow-up in 2 weeks         Patient will continue to follow with his primary physician and other consultants  Patient  voiced understanding and agrees     This note has been generated by voice recognition softener  Therefore there maybe spelling, grammar, and/or syntax errors  Please contact if questions arise  Counseling / Coordination of Care       Provided counseling and support

## 2022-07-29 ENCOUNTER — TELEPHONE (OUTPATIENT)
Dept: INFECTIOUS DISEASES | Facility: CLINIC | Age: 67
End: 2022-07-29

## 2022-07-29 NOTE — TELEPHONE ENCOUNTER
Catrachita from [facility] called the office today stating that yesterday the patient was not given his dose of antibiotic due to another nurse hsaka Domínguez stated that the antibiotic will be given today       :[phone]

## 2022-08-09 ENCOUNTER — OFFICE VISIT (OUTPATIENT)
Dept: WOUND CARE | Facility: CLINIC | Age: 67
End: 2022-08-09
Payer: MEDICARE

## 2022-08-09 VITALS
BODY MASS INDEX: 28.63 KG/M2 | SYSTOLIC BLOOD PRESSURE: 124 MMHG | RESPIRATION RATE: 16 BRPM | HEIGHT: 70 IN | TEMPERATURE: 98.8 F | WEIGHT: 200 LBS | HEART RATE: 68 BPM | DIASTOLIC BLOOD PRESSURE: 60 MMHG

## 2022-08-09 DIAGNOSIS — L97.519 DIABETIC ULCER OF RIGHT GREAT TOE (HCC): ICD-10-CM

## 2022-08-09 DIAGNOSIS — L97.522 SKIN ULCER OF TOE OF LEFT FOOT WITH FAT LAYER EXPOSED (HCC): ICD-10-CM

## 2022-08-09 DIAGNOSIS — L03.116 CELLULITIS OF LEFT FOOT: Primary | ICD-10-CM

## 2022-08-09 DIAGNOSIS — L97.512 SKIN ULCER OF TOE OF RIGHT FOOT WITH FAT LAYER EXPOSED (HCC): ICD-10-CM

## 2022-08-09 DIAGNOSIS — E11.621 DIABETIC ULCER OF LEFT MIDFOOT ASSOCIATED WITH TYPE 2 DIABETES MELLITUS, WITH FAT LAYER EXPOSED (HCC): ICD-10-CM

## 2022-08-09 DIAGNOSIS — E11.42 DIABETIC POLYNEUROPATHY ASSOCIATED WITH TYPE 2 DIABETES MELLITUS (HCC): ICD-10-CM

## 2022-08-09 DIAGNOSIS — E11.621 DIABETIC ULCER OF RIGHT GREAT TOE (HCC): ICD-10-CM

## 2022-08-09 DIAGNOSIS — L97.422 DIABETIC ULCER OF LEFT MIDFOOT ASSOCIATED WITH TYPE 2 DIABETES MELLITUS, WITH FAT LAYER EXPOSED (HCC): ICD-10-CM

## 2022-08-09 PROCEDURE — 11042 DBRDMT SUBQ TIS 1ST 20SQCM/<: CPT | Performed by: PODIATRIST

## 2022-08-09 PROCEDURE — 99214 OFFICE O/P EST MOD 30 MIN: CPT | Performed by: PODIATRIST

## 2022-08-09 PROCEDURE — 99213 OFFICE O/P EST LOW 20 MIN: CPT | Performed by: PODIATRIST

## 2022-08-09 RX ORDER — DOXYCYCLINE 100 MG/1
100 TABLET ORAL 2 TIMES DAILY
Qty: 14 TABLET | Refills: 0 | Status: SHIPPED | OUTPATIENT
Start: 2022-08-09 | End: 2022-08-16

## 2022-08-09 NOTE — PROGRESS NOTES
Patient ID: Jose Leyva is a 74 y o  male Date of Birth 1955       Chief Complaint   Patient presents with    New Patient Visit     Diabetic foot ulcer left foot  Had surgery 3 weeks  "Failed skin graft"  "Lost toe"       Allergies:  Invokana [canagliflozin], Lamisil [terbinafine], Other, and Latex    Diagnosis:  1  Cellulitis of left foot  -     doxycycline (ADOXA) 100 MG tablet; Take 1 tablet (100 mg total) by mouth 2 (two) times a day for 7 days    2  Skin ulcer of toe of left foot with fat layer exposed ([address] )  -     Wound cleansing and dressings; Future; Expected date: 08/09/2022  -     XR foot 3+ vw right; Future; Expected date: 08/09/2022    3  Skin ulcer of toe of right foot with fat layer exposed ([address] )  -     Wound cleansing and dressings; Future; Expected date: 08/09/2022  -     XR foot 3+ vw left; Future; Expected date: 08/09/2022    4  Diabetic polyneuropathy associated with type 2 diabetes mellitus ([facility] )       Diagnosis ICD-10-CM Associated Orders   1  Cellulitis of left foot  L03 116 doxycycline (ADOXA) 100 MG tablet   2  Skin ulcer of toe of left foot with fat layer exposed ([address] )  L97 522 Wound cleansing and dressings     XR foot 3+ vw right   3  Skin ulcer of toe of right foot with fat layer exposed ([address] )  L97 512 Wound cleansing and dressings     XR foot 3+ vw left   4  Diabetic polyneuropathy associated with type 2 diabetes mellitus ([address] )  E11 42         Assessment & Plan:  See wound orders     - debridement of the plantar aspect of the right foot, significant overload with hyperkeratosis to this area  -he does not have an MRI compatible pacemaker and we will obtain initial x-rays of the bilateral feet to rule out any osteomyelitis of the left 2nd metatarsal head in the right plantar proximal phalanx of the right hallux   -if the left foot does return positive he will need a transmetatarsal amputation of the left, he is controlling his blood sugars and he was given doxycycline for maintenance of the cellulitis of the left foot   - we discussed admission to the hospital at length for continued workup of the left foot, he would much prefer to stay at home and spend time with his family and friends  We agreed that if his left foot does deteriorate if he does have further erythema, increased pain, increased malodor, increased drainage, or high blood sugars he is to immediately go to the ER for further assessment and further imaging  -he is to return in 1 week for evaluation and if he is having osteomyelitis of the left foot we will attempt to perform a transmetatarsal amputation on outpatient basis  -healing of these wounds is going to be a rather complicated process especially if he does have toe TMA on the left lower extremity, does have significant comorbidities will directly impact his wound healing cascade such as previous chemotherapy, diabetes and peripheral arterial disease      Subjective:   Patient presents for evaluation and management of bilateral feet, states he does a wound on and off on the plantar aspect of the right foot which does happen around every 2 months    He notes that he did have the sutures removed last week by Dr Kingsley Salmeron, he is currently continuing to take Ancef previous MSSA bacteremia      The following portions of the patient's history were reviewed and updated as appropriate:   Patient Active Problem List   Diagnosis    Bilateral leg edema    Diabetic ulcer of toe of left foot associated with type 2 diabetes mellitus ([address] )    Easy bruising    Eczema    Erectile dysfunction of non-organic origin    Hyperlipidemia    Uremic acidosis    Arthritis    Peripheral arterial disease ([address] )    PVCs (premature ventricular contractions)    Rhinitis    Systolic murmur    Vertigo    Complete heart block (HCC)    Metabolic acidosis    ELZBIETA (acute kidney injury) ([address] )    Other hyperlipidemia    Persistent atrial fibrillation (HCC)    Persistent proteinuria    Volume overload    Urinary retention    NICOLASA (obstructive sleep apnea)    Type 2 diabetes mellitus with chronic kidney disease, with long-term current use of insulin (HCC)    Hypertension    Stage 4 chronic kidney disease (HCC)    Aortic valve stenosis, nonrheumatic    Anemia    Fever    Mitral stenosis    Abnormal CT of the chest    Hyperkalemia    Acute pulmonary edema (HCC)    Chronic diastolic (congestive) heart failure (HCC)    Left renal artery stenosis (HCC)    S/P amputation of lesser toe, left (HCC)    S/P amputation of lesser toe, right (HCC)    Elevated troponin I level    Staphylococcus aureus bacteremia    Type 2 diabetes mellitus with diabetic neuropathy, without long-term current use of insulin (HCC)    Hyponatremia    Iron deficiency anemia    Anxiety    Osteomyelitis of left foot (HCC)    Amputation of left great toe (HCC)    Multiple drug resistant organism (MDRO) culture positive    Renovascular hypertension    SSS (sick sinus syndrome) (HCC)    Chronic obstructive pulmonary disease (HCC)    Absence of toe (HCC)    Chronic painful diabetic neuropathy (HCC)    Onychomycosis    Colon cancer (HCC)    Acute kidney injury superimposed on chronic kidney disease (HCC)    RSV (respiratory syncytial virus pneumonia)    Chemotherapy-induced neutropenia (HCC)    Chemotherapy-induced nausea    Acute on chronic diastolic congestive heart failure (HCC)    Other iron deficiency anemias    Pulmonary hypertension (HCC)    S/P arteriovenous (AV) fistula creation    Severe sepsis with lactic acidosis (HCC)    Diabetic infection of left foot (HCC)    Gram-positive bacteremia    Acute respiratory failure with hypoxia (HCC)    End stage renal disease on dialysis [facility])     Past Medical History:   Diagnosis Date    Anemia     iron def    Arthritis     OA    Bruise of both arms     forearms and both hands    Bruises easily     Cancer ([facility] [address] ) 09/01/2021    colon    CHF (congestive heart failure) ([facility])     Chronic kidney disease     CPAP (continuous positive airway pressure) dependence     Diabetes mellitus ([facility] [facility] )     Diabetic foot ulcer ([facility] [facility] )     Eczema     Erectile dysfunction     Gout     Heart murmur     History of permanent cardiac pacemaker placement 11/2018    History of transfusion     Hyperlipidemia     Hypertension     Hypoglycemia 03/08/2019    Murmur     Nephropathy     Osteoarthritis     Pacemaker 11/06/2018    PAD (peripheral artery disease) ([facility])     Seasonal allergies     Sleep apnea     Toe infection     bilat great toes    Vertigo     Walks frequently     Wears dentures     upper    Wears glasses      Past Surgical History:   Procedure Laterality Date    CARDIAC PACEMAKER PLACEMENT      CARPAL TUNNEL RELEASE Left     CARPAL TUNNEL RELEASE Right     CARPAL TUNNEL RELEASE      COLONOSCOPY  08/2020    COLONOSCOPY      FL GUIDED CENTRAL VENOUS ACCESS DEVICE INSERTION  01/11/2022    FOOT AMPUTATION Left 02/10/2020    Procedure: PARTIAL 1ST RAY AMPUTATION;  Surgeon: Taylor Valentine DPM;  [facility];  Service: Podiatry    INCISION AND DRAINAGE OF WOUND Left 01/12/2020    Procedure: INCISION AND DRAINAGE (I&D) EXTREMITY AND REMOVAL OF SESMOID BONE;  Surgeon: Peri Aranda DPM;  [facility];  Service: Podiatry    IR OTHER  01/21/2022    IR PICC REPOSITION  01/14/2020    IR PORT REMOVAL  7/12/2022    IR TUNNELED DIALYSIS CATHETER PLACEMENT  04/26/2022    KNEE ARTHROSCOPY Left     KNEE ARTHROSCOPY Right     KNEE SURGERY      VA AMPUTATION TOE,I-P JT Bilateral 05/02/2017    Procedure: PARTIAL AMPUTATION RIGHT AND LEFT HALLUX ;  Surgeon: Taylor Valentine DPM;  [facility];  Service: Podiatry    VA AMPUTATION TOE,MT-P JT Left 07/25/2017    Procedure: 2ND TOE AMPUTATION;  Surgeon: Taylor Valentine DPM;  [facility];  Service: Podiatry    VA ANASTOMOSIS,AV,ANY SITE Left 6/1/2022    Procedure: CREATION FISTULA ARTERIOVENOUS (AV) RADIOCEPHALIC;  Surgeon: Celestina Epstein MD;  [facility];  Service: Vascular    [place] [facility] TUNNELED CTR VAD W/SUBQ PORT AGE 5 YR/> N/A 2022    Procedure: INSERTION VENOUS PORT ( PORT-A-CATH) IR;  Surgeon: Lennox Grieves, DO;  [facility];  Service: Interventional Radiology    RESECTION LOW ANTERIOR LAPAROSCOPIC N/A 10/21/2021    Procedure: RESECTION LOW ANTERIOR LAPAROSCOPIC;  Surgeon: Mk Stockton MD;  [facility];  Service: Colorectal    SHOULDER ARTHROSCOPY Left     with screws,RTC    SHOULDER SURGERY Left     rotator cuff    TOE AMPUTATION Left 2020    Procedure: Srinivasan Medellin;  Surgeon: Catherine Gallego DPM;  [facility];  Service: Podiatry    UPPER GASTROINTESTINAL ENDOSCOPY  2020    Intestinal metaplasia prepyloric    VASECTOMY      WOUND DEBRIDEMENT Left 2022    Procedure: DEBRIDEMENT WOUND [facility] OUT), possible 2nd toe amputation;  Surgeon: Annie Tyson DPM;  [facility];  Service: Podiatry     Social History     Socioeconomic History    Marital status: /Civil Union     Spouse name: Not on file    Number of children: Not on file    Years of education: Not on file    Highest education level: Not on file   Occupational History    Not on file   Tobacco Use    Smoking status: Former Smoker     Packs/day: 0 50     Years: 20 00     Pack years: 10 00     Types: Cigarettes     Start date: 1     Quit date:      Years since quittin 6    Smokeless tobacco: Never Used    Tobacco comment: quit 10 years ago   Vaping Use    Vaping Use: Never used   Substance and Sexual Activity    Alcohol use: Never    Drug use: No    Sexual activity: Not Currently     Partners: Female   Other Topics Concern    Not on file   Social History Narrative    ** Merged History Encounter **         Daily cola consumption (2 cans/day)     Social Determinants of Health     Financial Resource Strain: Not on file   Food Insecurity: No Food Insecurity    Worried About Running Out of Food in the Last Year: Never true    Ran Out of Food in the Last Year: Never true   Transportation Needs: No Transportation Needs    Lack of Transportation (Medical): No    Lack of Transportation (Non-Medical): No   Physical Activity: Not on file   Stress: Not on file   Social Connections: Not on file   Intimate Partner Violence: Not on file   Housing Stability: Low Risk     Unable to Pay for Housing in the Last Year: No    Number of Places Lived in the Last Year: 1    Unstable Housing in the Last Year: No        Current Outpatient Medications:     doxycycline (ADOXA) 100 MG tablet, Take 1 tablet (100 mg total) by mouth 2 (two) times a day for 7 days, Disp: 14 tablet, Rfl: 0    allopurinol (ZYLOPRIM) 300 mg tablet, Take 1 tablet (300 mg total) by mouth every morning, Disp: 90 tablet, Rfl: 0    apixaban (ELIQUIS) 5 mg, Take 1 tablet (5 mg total) by mouth every 12 (twelve) hours (Patient taking differently: Take by mouth every 12 (twelve) hours), Disp: 180 tablet, Rfl: 3    b complex-vitamin C-folic acid (NEPHROCAPS) 1 mg capsule, Take 1 capsule by mouth in the morning , Disp: 90 capsule, Rfl: 3    ceFAZolin (ANCEF) 1000 mg IVPB, 2g/2g/3g dosed after HD M/W/F, Disp: 1 each, Rfl: 0    Dupilumab (Dupixent) 300 MG/2ML SOPN, Inject 300 mg under the skin Once every 2 weeks, Disp: , Rfl:     famotidine (PEPCID) 40 MG tablet, TAKE 1 TABLET BY MOUTH EVERY DAY, Disp: 90 tablet, Rfl: 3    Insulin Degludec-Liraglutide (Xultophy) 100 units-3 6 mg/mL injection pen, Inject 19 units daily  , Disp: 18 mL, Rfl: 0    Insulin Pen Needle (BD Pen Needle Zenaida U/F) 32G X 4 MM MISC, Use 1 daily, Disp: 100 each, Rfl: 2    metoprolol tartrate (LOPRESSOR) 50 mg tablet, Take 1 tablet (50 mg total) by mouth every 12 (twelve) hours, Disp: 180 tablet, Rfl: 3    midodrine (PROAMATINE) 5 mg tablet, Take 1 tablet (5 mg total) by mouth 3 (three) times a week Take before dialysis, Disp: 30 tablet, Rfl: 2    omega-3-acid ethyl esters (LOVAZA) 1 g capsule, Take 2 capsules (2 g total) by mouth 2 (two) times a day Mail print prescription to pt, Disp: 360 capsule, Rfl: 3    ondansetron (ZOFRAN) 8 mg tablet, Take 1 tablet (8 mg total) by mouth every 8 (eight) hours as needed for nausea or vomiting (Patient not taking: Reported on 7/27/2022), Disp: 20 tablet, Rfl: 3    OneTouch Ultra test strip, USE TO TEST BLOOD SUGAR 3 TIMES A DAY, Disp: 100 each, Rfl: 3    sevelamer carbonate (RENVELA) 800 mg tablet, Take 1 tablet (800 mg total) by mouth in the morning and 1 tablet (800 mg total) at noon and 1 tablet (800 mg total) in the evening  Take with meals  , Disp: 270 tablet, Rfl: 3    simvastatin (ZOCOR) 20 mg tablet, Take 1 tablet (20 mg total) by mouth daily at bedtime, Disp: 90 tablet, Rfl: 3    tamsulosin (FLOMAX) 0 4 mg, TAKE 1 CAPSULE BY MOUTH EVERY DAY WITH DINNER, Disp: 90 capsule, Rfl: 3    torsemide (DEMADEX) 20 mg tablet, Take 4 tablets (80 mg total) by mouth 4 (four) times a week Take on non-dialysis days only, Disp: 90 tablet, Rfl: 3  Family History   Problem Relation Age of Onset    Heart disease Father         passed away    Hypertension Father     Pulmonary embolism Father     Hypertension Mother         assed away      Review of Systems   Constitutional: Negative for chills and fever  HENT: Negative for ear pain and sore throat  Eyes: Negative for pain and visual disturbance  Respiratory: Negative for cough and shortness of breath  Cardiovascular: Negative for chest pain and palpitations  Gastrointestinal: Negative for abdominal pain and vomiting  Genitourinary: Negative for dysuria and hematuria  Musculoskeletal: Negative for arthralgias and back pain  Skin: Negative for color change and rash  Neurological: Negative for seizures and syncope  All other systems reviewed and are negative          Objective:  /60   Pulse 68   Temp 98 8 °F (37 1 °C)   Resp 16   Ht 5' 10" (1 778 m)   Wt 90 7 kg (200 lb)   BMI 28 70 kg/m²     Physical Exam  Constitutional:       Appearance: Normal appearance  HENT:      Head: Normocephalic and atraumatic  Nose: Nose normal       Mouth/Throat:      Mouth: Mucous membranes are moist    Eyes:      Pupils: Pupils are equal, round, and reactive to light  Pulmonary:      Effort: Pulmonary effort is normal    Musculoskeletal:         General: Swelling and deformity present  Comments: There is an ulceration with total dehiscence of the left 2nd digit amputation site  This dehiscence does probe directly to bone of the 2nd metatarsal head  There is erythema extending to the left midfoot with soft tissue swelling as well  No malodor  There is also an ulceration on the plantar aspect of the right big toe measuring 0 6 x 0 6 x 0 2 cm following debridement no direct probe to bone and a 100% granular base, he does have functional hallux limitus with loading of the foot   Skin:     Capillary Refill: Capillary refill takes 2 to 3 seconds  Neurological:      General: No focal deficit present  Mental Status: He is alert and oriented to person, place, and time  Wound 01/11/22 Chest N/A (Active)       Wound 08/09/22 Diabetic Ulcer Foot Anterior; Left (Active)   Wound Description Pink;Yellow; Other (Comment) 08/09/22 1305   Marla-wound Assessment Maceration 08/09/22 1305   Wound Length (cm) 3 5 cm 08/09/22 1305   Wound Width (cm) 1 cm 08/09/22 1305   Wound Depth (cm) 2 4 cm 08/09/22 1305   Wound Surface Area (cm^2) 3 5 cm^2 08/09/22 1305   Wound Volume (cm^3) 8 4 cm^3 08/09/22 1305   Calculated Wound Volume (cm^3) 8 4 cm^3 08/09/22 1305   Drainage Amount Moderate 08/09/22 1305   Drainage Description Serosanguineous 08/09/22 1305   Non-staged Wound Description Full thickness 08/09/22 1305       Wound 08/09/22 Diabetic Ulcer Toe (Comment  which one) Anterior;Right (Active)   Wound Description Other (Comment) 08/09/22 1317   Marla-wound Assessment Callus 08/09/22 1317   Wound Length (cm) 0 5 cm 08/09/22 1317   Wound Width (cm) 0 5 cm 08/09/22 1317   Wound Depth (cm) 0 1 cm 08/09/22 1317   Wound Surface Area (cm^2) 0 25 cm^2 08/09/22 1317   Wound Volume (cm^3) 0 025 cm^3 08/09/22 1317   Calculated Wound Volume (cm^3) 0 03 cm^3 08/09/22 1317   Drainage Amount None 08/09/22 1317                         Debridement   Wound 08/09/22 Diabetic Ulcer Toe (Comment  which one) Anterior;Right    Universal Protocol:  Consent: Verbal consent obtained  Consent given by: patient  Timeout called at: 8/9/2022 1:53 PM   Patient understanding: patient states understanding of the procedure being performed  Patient consent: the patient's understanding of the procedure matches consent given      Performed by: physician  Debridement type: surgical  Level of debridement: subcutaneous tissue  Pain control: none  Post-debridement measurements  Length (cm): 0 6  Width (cm): 6  Depth (cm): 0 2  Percent debrided: 100%  Surface Area (cm^2): 3 6  Area debrided (cm^2): 3 6  Volume (cm^3): 0 72  Tissue and other material debrided: subcutaneous tissue  Devitalized tissue debrided: biofilm, callus, necrotic debris and slough  Instrument(s) utilized: blade  Bleeding: medium  Hemostasis obtained with: pressure  Procedural pain (0-10): insensate  Post-procedural pain: insensate                    Wound Instructions:  Orders Placed This Encounter   Procedures    Wound cleansing and dressings     Left foot ulcer:  Wash your hands with soap and water  Remove old dressing, discard into plastic bag and place in trash  Cleanse the wound with normal saline solution prior to applying a clean dressing  Do not use tissue or cotton balls  Do not scrub the wound  Pat dry using gauze  Shower no   Apply AMD packing to the left foot wound  Cover with gauze  Secure with sergio and tape  Change dressing every other day        Report to ER for nausea, vomiting, chills, fever, redness in foot and increased warmth in foot  Right great toe ulcer:  Wash your hands with soap and water  Remove old dressing, discard into plastic bag and place in trash  Cleanse the wound with normal saline solution prior to applying a clean dressing  Do not use tissue or cotton balls  Do not scrub the wound  Pat dry using gauze  Shower no   Apply Dermagran gauze to the right toe wound  Cover with gauze  Secure with tape  Change dressing every other day  Complete antibiotics as ordered  Complete ray as ordered  Follow up in 1 week  Standing Status:   Future     Standing Expiration Date:   8/9/2023    XR foot 3+ vw right     Standing Status:   Future     Standing Expiration Date:   8/9/2026     Scheduling Instructions:      Bring along any outside films relating to this procedure  Weightbearing three views of right foot    XR foot 3+ vw left     Standing Status:   Future     Standing Expiration Date:   8/9/2026     Scheduling Instructions:      Bring along any outside films relating to this procedure  Weight-bearing three views of left foot         Mary Kay Muñiz DPM      Portions of the record may have been created with voice recognition software  Occasional wrong word or "sound a like" substitutions may have occurred due to the inherent limitations of voice recognition software  Read the chart carefully and recognize, using context, where substitutions have occurred

## 2022-08-09 NOTE — PATIENT INSTRUCTIONS
Orders Placed This Encounter   Procedures    Wound cleansing and dressings     Left foot ulcer:  Wash your hands with soap and water  Remove old dressing, discard into plastic bag and place in trash  Cleanse the wound with normal saline solution prior to applying a clean dressing  Do not use tissue or cotton balls  Do not scrub the wound  Pat dry using gauze  Shower no   Apply AMD packing to the left foot wound  Cover with gauze  Secure with sergio and tape  Change dressing every other day  Report to ER for nausea, vomiting, chills, fever, redness in foot and increased warmth in foot  Right great toe ulcer:  Wash your hands with soap and water  Remove old dressing, discard into plastic bag and place in trash  Cleanse the wound with normal saline solution prior to applying a clean dressing  Do not use tissue or cotton balls  Do not scrub the wound  Pat dry using gauze  Shower no   Apply Dermagran gauze to the right toe wound  Cover with gauze  Secure with tape  Change dressing every other day  Complete antibiotics as ordered  Complete ray as ordered  Follow up in 1 week  Standing Status:   Future     Standing Expiration Date:   8/9/2023    XR foot 3+ vw right     Standing Status:   Future     Standing Expiration Date:   8/9/2026     Scheduling Instructions:      Bring along any outside films relating to this procedure  Weightbearing three views of right foot    XR foot 3+ vw left     Standing Status:   Future     Standing Expiration Date:   8/9/2026     Scheduling Instructions:      Bring along any outside films relating to this procedure        Weight-bearing three views of left foot

## 2022-08-11 ENCOUNTER — TELEMEDICINE (OUTPATIENT)
Dept: INFECTIOUS DISEASES | Facility: CLINIC | Age: 67
End: 2022-08-11
Payer: MEDICARE

## 2022-08-11 ENCOUNTER — APPOINTMENT (OUTPATIENT)
Dept: RADIOLOGY | Facility: CLINIC | Age: 67
End: 2022-08-11
Payer: MEDICARE

## 2022-08-11 VITALS
HEIGHT: 70 IN | SYSTOLIC BLOOD PRESSURE: 130 MMHG | HEART RATE: 76 BPM | TEMPERATURE: 97.5 F | WEIGHT: 200 LBS | DIASTOLIC BLOOD PRESSURE: 70 MMHG | BODY MASS INDEX: 28.63 KG/M2

## 2022-08-11 DIAGNOSIS — L02.612 ABSCESS OF LEFT FOOT: ICD-10-CM

## 2022-08-11 DIAGNOSIS — L97.512 SKIN ULCER OF TOE OF RIGHT FOOT WITH FAT LAYER EXPOSED (HCC): ICD-10-CM

## 2022-08-11 DIAGNOSIS — N18.6 END STAGE RENAL DISEASE ON DIALYSIS (HCC): ICD-10-CM

## 2022-08-11 DIAGNOSIS — Z79.2 LONG TERM (CURRENT) USE OF ANTIBIOTICS: ICD-10-CM

## 2022-08-11 DIAGNOSIS — L03.116 CELLULITIS OF LEFT FOOT: ICD-10-CM

## 2022-08-11 DIAGNOSIS — B95.61 STAPHYLOCOCCUS AUREUS BACTEREMIA: ICD-10-CM

## 2022-08-11 DIAGNOSIS — E11.40 TYPE 2 DIABETES MELLITUS WITH DIABETIC NEUROPATHY, WITHOUT LONG-TERM CURRENT USE OF INSULIN (HCC): Primary | ICD-10-CM

## 2022-08-11 DIAGNOSIS — L97.522 SKIN ULCER OF TOE OF LEFT FOOT WITH FAT LAYER EXPOSED (HCC): ICD-10-CM

## 2022-08-11 DIAGNOSIS — Z99.2 END STAGE RENAL DISEASE ON DIALYSIS (HCC): ICD-10-CM

## 2022-08-11 DIAGNOSIS — R78.81 STAPHYLOCOCCUS AUREUS BACTEREMIA: ICD-10-CM

## 2022-08-11 PROCEDURE — 73630 X-RAY EXAM OF FOOT: CPT

## 2022-08-11 PROCEDURE — 99214 OFFICE O/P EST MOD 30 MIN: CPT | Performed by: INTERNAL MEDICINE

## 2022-08-11 NOTE — PROGRESS NOTES
Progress Note - Infectious Disease   Bill Schuler 74 y o  male MRN: [id number]  Unit/Bed#:  Encounter: [id number]    REQUIRED DOCUMENTATION:   1  This service was provided via Telemedicine  2  Provider located at [address] [place] [address] office  3  TeleMed provider: Ugo Jordan DO   4  Identify all parties in room with patient during tele consult: Pt and his wife at their home address in [address] [place] [address]  5  After connecting through [employer], patient was identified by name and date of birth  Patient was then informed that this was a Telemedicine visit and that the exam was being conducted confidentially over secure lines  My office door was closed  Patient acknowledged consent and understanding of privacy and security of the Telemedicine visit  I informed the patient that I have reviewed their record in Epic and presented the opportunity for them to ask any questions regarding the visit today  The patient agreed to participate  Impression/Recommendations:   1  MSSA bacteremia  Due to left foot cellulitis  Repeat blood cultures negative  Challenging situation as patient has underlying PPM, left shoulder hardware, right chest diaylsis catheter and PortACath  TTE no vegetations but not diagnostic  Patient would not wish to undergo major surgery, PPM removal  He would favor empiric treatment course and then invasive testing/treatments (ELBERT if infection relapses)  PortACath removed 7/12/22  Nephrology wishes to attempt HD catheter salvage given poor access               -continue IV cefazolin 2g/2g/3g dosed after HD MWF              -recommend a 6 week course of IV antibiotics through 8/19/22              -check surveillance blood cultures 2 weeks after completion of antibiotics               -weekly CBC with diff and CMP on IV antibiotics               -Ideally HD catheter would also be removed or exchanged; however, it was salvaged per patient and nephrology services wishes   Catheter will need to be removed if infection recurs  -ID follow up next week      2  Left foot cellulitis and abscess  Status post bedside debridement by Podiatry 7/10  Cerectec scan nondiagnostic of osteomyelitis  S/p operative debridement and 2nd toe amputation on 7/16  Wound dehiscence is reported at the left 2nd digit amputation site per Podiatry service on 08/09/2022 which probes directly to bone of the 2nd metatarsal head  Patient was prescribed a 7 day course of doxycycline as per Podiatry service              -Podiatry follow-up advised, with Dr Dwain Felix   -Continue doxycycline per Dr Dwain Felix     3  Type 2 diabetes  Risk factor for infection, poor wound healing  HbA1c of 6 0 on 2/18/22               -Glycemic control to aid with wound healing as per primary team     4  Sigmoid cancer  Last cycle chemotherapy was 7/5 via R chest port-a-cath  Risk factor for immune suppression              -Management per Heme-Onc     5  ESRD on dialysis  Via R permacath with no local signs of infection  Patient and nephrology wish to attempt catheter salvage                -[id number] per nephrology     My recommendations were discussed with the patient in detail who verbalized understanding  Thank you for allowing me to participate in the care of this patient  The ID service will follow  History   Subjective:  22-year-old man seen for office follow-up after hospital discharge from [facility] on 7/17/2022  During that hospitalization, patient was seen by the Infectious Disease service for evaluation of sepsis and MSSA bacteremia which is felt to be due to left foot cellulitis and abscess  Patient was treated with cefazolin IV during his hospitalization  Upon hospital discharge, a 6 week treatment course was advised thru August 19th  Currently patient is receiving cefazolin IV 3 times a week with dialysis  Pt says he feels fine  He has been tolerating antibiotic therapy     Pt was seen by his podiatrist 2 days who was concerned about wound dehiscence and delayed wound healing  He was prescribed a 7 day course of doxycycline oral  A wick was placed by his podiatrist  He reports watery drainage mixed with blood without malodor  Pt went for LT foot XR today  He denies fevers, chills, or sweats  Patient denies nausea, vomiting, diarrhea, or rash  Antibiotics: vanco iv and doxycycline oral    Current Outpatient Medications:     allopurinol (ZYLOPRIM) 300 mg tablet, Take 1 tablet (300 mg total) by mouth every morning, Disp: 90 tablet, Rfl: 0    apixaban (ELIQUIS) 5 mg, Take 1 tablet (5 mg total) by mouth every 12 (twelve) hours (Patient taking differently: Take by mouth every 12 (twelve) hours), Disp: 180 tablet, Rfl: 3    b complex-vitamin C-folic acid (NEPHROCAPS) 1 mg capsule, Take 1 capsule by mouth in the morning , Disp: 90 capsule, Rfl: 3    ceFAZolin (ANCEF) 1000 mg IVPB, 2g/2g/3g dosed after HD M/W/F, Disp: 1 each, Rfl: 0    doxycycline (ADOXA) 100 MG tablet, Take 1 tablet (100 mg total) by mouth 2 (two) times a day for 7 days, Disp: 14 tablet, Rfl: 0    Dupilumab (Dupixent) 300 MG/2ML SOPN, Inject 300 mg under the skin Once every 2 weeks, Disp: , Rfl:     famotidine (PEPCID) 40 MG tablet, TAKE 1 TABLET BY MOUTH EVERY DAY, Disp: 90 tablet, Rfl: 3    Insulin Degludec-Liraglutide (Xultophy) 100 units-3 6 mg/mL injection pen, Inject 19 units daily  , Disp: 18 mL, Rfl: 0    Insulin Pen Needle (BD Pen Needle Zenaida U/F) 32G X 4 MM MISC, Use 1 daily, Disp: 100 each, Rfl: 2    metoprolol tartrate (LOPRESSOR) 50 mg tablet, Take 1 tablet (50 mg total) by mouth every 12 (twelve) hours, Disp: 180 tablet, Rfl: 3    midodrine (PROAMATINE) 5 mg tablet, Take 1 tablet (5 mg total) by mouth 3 (three) times a week Take before dialysis, Disp: 30 tablet, Rfl: 2    omega-3-acid ethyl esters (LOVAZA) 1 g capsule, Take 2 capsules (2 g total) by mouth 2 (two) times a day Mail print prescription to pt, Disp: 360 capsule, Rfl: 3   OneTouch Ultra test strip, USE TO TEST BLOOD SUGAR 3 TIMES A DAY, Disp: 100 each, Rfl: 3    sevelamer carbonate (RENVELA) 800 mg tablet, Take 1 tablet (800 mg total) by mouth in the morning and 1 tablet (800 mg total) at noon and 1 tablet (800 mg total) in the evening  Take with meals  , Disp: 270 tablet, Rfl: 3    simvastatin (ZOCOR) 20 mg tablet, Take 1 tablet (20 mg total) by mouth daily at bedtime, Disp: 90 tablet, Rfl: 3    tamsulosin (FLOMAX) 0 4 mg, TAKE 1 CAPSULE BY MOUTH EVERY DAY WITH DINNER, Disp: 90 capsule, Rfl: 3    torsemide (DEMADEX) 20 mg tablet, Take 4 tablets (80 mg total) by mouth 4 (four) times a week Take on non-dialysis days only, Disp: 90 tablet, Rfl: 3    ondansetron (ZOFRAN) 8 mg tablet, Take 1 tablet (8 mg total) by mouth every 8 (eight) hours as needed for nausea or vomiting (Patient not taking: Reported on 7/27/2022), Disp: 20 tablet, Rfl: 3    Physical Exam     Vitals:    08/11/22 1246   BP: 130/70   Pulse: 76   Temp: 97 5 °F (36 4 °C)   Weight: 90 7 kg (200 lb)   Height: 5' 10" (1 778 m)   Limited exam due to tele health visit  General Appearance:  Appearing well, nontoxic, and in no distress   Head:  Normocephalic, atraumatic   Lungs:   Respirations nonlabored   Skin: No rash per RN  LT foot dressing intact with serous drainage noted on plantar aspect   Neurologic: Alert and oriented to person, surroundings, conversant, fluent speech     Invasive Devices:   HD Permanent Double Catheter (Active)     Labs, Imaging, & Other Studies   Lab Results: I have personally reviewed pertinent labs  7/28/22:  WBC 7 9, HGB 9 2, HCT 29 8,   BUN 34, Creatinine 3 78, AST 23, ALT less than 9    Imaging Studies:   8/11/22 XR LT foot: s/p amputation proximal 1st metatarsal and amputation of 2nd toe base  Office radiology report is pending  I have personally reviewed pertinent imaging study reports and images in PACS  Counseling/Coordination of care:    Total 30 minutes encounter including direct communication with the patient via telehealth, chart review and coordination of care  Labs, medical tests and imaging studies were independently reviewed by me as noted above  VIRTUAL VISIT DISCLAIMER  The patient has consented to an online visit or consultation  The patient understands that the online visit is based solely on information provided by them, and that, in the absence of a face-to-face physical evaluation by the physician, the diagnosis they receive are both limited and provisional in terms of accuracy and completeness  This is not intended to replace a full medical face-to-face evaluation by the physician  The patient understands and accepts these terms

## 2022-08-11 NOTE — PATIENT INSTRUCTIONS
Continue taking Cefazolin as prescribed after dialysis  Continue weekly labs      Follow up with Dr Mary Degroot 8/18/2022 at 3:30pm

## 2022-08-16 ENCOUNTER — OFFICE VISIT (OUTPATIENT)
Dept: WOUND CARE | Facility: CLINIC | Age: 67
End: 2022-08-16
Payer: MEDICARE

## 2022-08-16 ENCOUNTER — OFFICE VISIT (OUTPATIENT)
Dept: CARDIOLOGY CLINIC | Facility: CLINIC | Age: 67
End: 2022-08-16
Payer: MEDICARE

## 2022-08-16 VITALS
DIASTOLIC BLOOD PRESSURE: 62 MMHG | SYSTOLIC BLOOD PRESSURE: 126 MMHG | RESPIRATION RATE: 18 BRPM | TEMPERATURE: 97.8 F | HEART RATE: 96 BPM

## 2022-08-16 VITALS
WEIGHT: 196 LBS | BODY MASS INDEX: 28.06 KG/M2 | HEIGHT: 70 IN | DIASTOLIC BLOOD PRESSURE: 56 MMHG | HEART RATE: 80 BPM | SYSTOLIC BLOOD PRESSURE: 106 MMHG

## 2022-08-16 DIAGNOSIS — I35.0 AORTIC VALVE STENOSIS, NONRHEUMATIC: Primary | ICD-10-CM

## 2022-08-16 DIAGNOSIS — I44.2 COMPLETE HEART BLOCK (HCC): ICD-10-CM

## 2022-08-16 DIAGNOSIS — L97.522 SKIN ULCER OF TOE OF LEFT FOOT WITH FAT LAYER EXPOSED (HCC): Primary | ICD-10-CM

## 2022-08-16 DIAGNOSIS — E11.42 DIABETIC POLYNEUROPATHY ASSOCIATED WITH TYPE 2 DIABETES MELLITUS (HCC): ICD-10-CM

## 2022-08-16 DIAGNOSIS — L03.116 CELLULITIS OF LEFT FOOT: ICD-10-CM

## 2022-08-16 DIAGNOSIS — I34.2 NONRHEUMATIC MITRAL VALVE STENOSIS: ICD-10-CM

## 2022-08-16 PROCEDURE — 99214 OFFICE O/P EST MOD 30 MIN: CPT | Performed by: INTERNAL MEDICINE

## 2022-08-16 PROCEDURE — 99213 OFFICE O/P EST LOW 20 MIN: CPT | Performed by: PODIATRIST

## 2022-08-16 NOTE — PROGRESS NOTES
Patient ID: Alvaro Both is a 74 y o  male  Plan:      Complete heart block ([address] )  Pacemaker in place  Aortic valve stenosis, nonrheumatic  Mild to moderate by echo and exam   Will recheck in 1 year by echo  Mitral stenosis  Mild by my echo review  Will recheck by echo again in 1 year  Follow up Plan/Other summary comments:  Return in about 1 year (around 8/16/2023)  HPI:  Patient is seen in follow-up today regarding the above issues  He recently was in our hospital for bacteremia and a foot infection  There was much consternation about whether he should have his pacemaker removed and the utility of a transesophageal echo  Ultimately it was decided to treat him with prolonged antibiotics and surveillance blood cultures after antibiotics  Patient was quite reluctant to undergo invasive treatments  His infusion port was discontinued  At home he has felt reasonably okay  Certainly no feverishness  A dual-chamber Medtronic pacemaker was placed on 11/06/2018  There has been most recently by echo mild-to-moderate aortic stenosis and mild mitral stenosis  Most recent or relevant cardiac/vascular testing:    TTE 07/08/2022:  Normal LV systolic function  Mild to moderate aortic stenosis  Mild LVH  Mild mitral stenosis          Past Surgical History:   Procedure Laterality Date    CARDIAC PACEMAKER PLACEMENT      CARPAL TUNNEL RELEASE Left     CARPAL TUNNEL RELEASE Right     CARPAL TUNNEL RELEASE      COLONOSCOPY  08/2020    COLONOSCOPY      FL GUIDED CENTRAL VENOUS ACCESS DEVICE INSERTION  01/11/2022    FOOT AMPUTATION Left 02/10/2020    Procedure: PARTIAL 1ST RAY AMPUTATION;  Surgeon: Brandee Russ DPM;  [facility];  Service: Podiatry    INCISION AND DRAINAGE OF WOUND Left 01/12/2020    Procedure: INCISION AND DRAINAGE (I&D) EXTREMITY AND REMOVAL OF SESMOID BONE;  Surgeon: Yoshi Espinoza DPM;  [facility];  Service: Podiatry    IR OTHER  01/21/2022    IR PICC REPOSITION  01/14/2020    IR PORT REMOVAL  7/12/2022    IR TUNNELED DIALYSIS CATHETER PLACEMENT  04/26/2022    KNEE ARTHROSCOPY Left     KNEE ARTHROSCOPY Right     KNEE SURGERY      VT AMPUTATION TOE,I-P JT Bilateral 05/02/2017    Procedure: PARTIAL AMPUTATION RIGHT AND LEFT HALLUX ;  Surgeon: Taylor Valentine DPM;  [facility];  Service: Podiatry    VT AMPUTATION TOE,MT-P JT Left 07/25/2017    Procedure: 2ND TOE AMPUTATION;  Surgeon: Taylor Valentine DPM;  [facility];  Service: Podiatry    VT ANASTOMOSIS,AV,ANY SITE Left 6/1/2022    Procedure: CREATION FISTULA ARTERIOVENOUS (AV) RADIOCEPHALIC;  Surgeon: Rahat Wolff MD;  [facility];  Service: Vascular    VT INSJ TUNNELED CTR VAD W/SUBQ PORT AGE 5 YR/> N/A 01/11/2022    Procedure: INSERTION VENOUS PORT ( PORT-A-CATH) IR;  Surgeon: Stepan Martinez DO;  [facility];  Service: Interventional Radiology    RESECTION LOW ANTERIOR LAPAROSCOPIC N/A 10/21/2021    Procedure: RESECTION LOW ANTERIOR LAPAROSCOPIC;  Surgeon: Junior Preet MD;  [facility];  Service: Colorectal    SHOULDER ARTHROSCOPY Left     with screws,RTC    SHOULDER SURGERY Left     rotator cuff    TOE AMPUTATION Left 01/08/2020    Procedure: HALLUX AMPUTATION;  Surgeon: Peri Aranda DPM;  [facility];  Service: Podiatry    UPPER GASTROINTESTINAL ENDOSCOPY  03/2020    Intestinal metaplasia prepyloric    VASECTOMY      WOUND DEBRIDEMENT Left 7/16/2022    Procedure: DEBRIDEMENT WOUND [facility] OUT), possible 2nd toe amputation;  Surgeon: Laeny Mccoy DPM;  [facility];  Service: Podiatry     CMP:   Lab Results   Component Value Date     (L) 04/10/2017    K 4 0 07/17/2022    K 5 0 04/10/2017     07/17/2022    CL 96 (L) 04/10/2017    CO2 27 07/17/2022    CO2 7 (LL) 11/01/2018    BUN 24 07/17/2022    BUN 19 04/10/2017    CREATININE 2 69 (H) 07/17/2022    CREATININE 1 02 04/10/2017    GLUCOSE 142 (H) 11/01/2018    EGFR 23 07/17/2022       Lipid Profile:   Lab Results   Component Value Date    CHOL 161 12/12/2015    TRIG 122 07/31/2021    TRIG 231 (H) 12/12/2015    HDL 32 (L) 07/31/2021    HDL 42 12/12/2015         Review of Systems   10  point ROS  was otherwise non pertinent or negative except as per HPI or as below  Gait:  Fair  Objective:     /56   Pulse 80   Ht 5' 10" (1 778 m)   Wt 88 9 kg (196 lb)   BMI 28 12 kg/m²     PHYSICAL EXAM:  General:  Normal appearance in no distress  Eyes:  Anicteric  Oral mucosa:  Moist   Neck:  No JVD  Carotid upstrokes are brisk without bruits  No masses  Recent fistula left arm not yet being used  Chest:  Clear to auscultation  Cardiac:  No palpable PMI  Normal S1 and S2   Grade 2-3 systolic murmur heard throughout the chest   No diastolic murmur  No  gallop or rub  Abdomen:  Soft and nontender  No palpable organomegaly or aortic enlargement  Extremities:  No peripheral edema  Musculoskeletal:  Symmetric  Vascular:  Femoral pulses are brisk without bruits  Popliteal  pulses are intact bilaterally  Left foot is in a boot  Neuro:  Grossly symmetric  Psych:  Alert and oriented x3  Current Outpatient Medications:     allopurinol (ZYLOPRIM) 300 mg tablet, Take 1 tablet (300 mg total) by mouth every morning, Disp: 90 tablet, Rfl: 0    b complex-vitamin C-folic acid (NEPHROCAPS) 1 mg capsule, Take 1 capsule by mouth in the morning , Disp: 90 capsule, Rfl: 3    ceFAZolin (ANCEF) 1000 mg IVPB, 2g/2g/3g dosed after HD M/W/F, Disp: 1 each, Rfl: 0    Dupilumab (Dupixent) 300 MG/2ML SOPN, Inject 300 mg under the skin Once every 2 weeks, Disp: , Rfl:     famotidine (PEPCID) 40 MG tablet, TAKE 1 TABLET BY MOUTH EVERY DAY, Disp: 90 tablet, Rfl: 3    Insulin Degludec-Liraglutide (Xultophy) 100 units-3 6 mg/mL injection pen, Inject 19 units daily  , Disp: 18 mL, Rfl: 0    Insulin Pen Needle (BD Pen Needle Zenaida U/F) 32G X 4 MM MISC, Use 1 daily, Disp: 100 each, Rfl: 2    metoprolol tartrate (LOPRESSOR) 50 mg tablet, Take 1 tablet (50 mg total) by mouth every 12 (twelve) hours, Disp: 180 tablet, Rfl: 3    midodrine (PROAMATINE) 5 mg tablet, Take 1 tablet (5 mg total) by mouth 3 (three) times a week Take before dialysis, Disp: 30 tablet, Rfl: 2    omega-3-acid ethyl esters (LOVAZA) 1 g capsule, Take 2 capsules (2 g total) by mouth 2 (two) times a day Mail print prescription to pt, Disp: 360 capsule, Rfl: 3    OneTouch Ultra test strip, USE TO TEST BLOOD SUGAR 3 TIMES A DAY, Disp: 100 each, Rfl: 3    sevelamer carbonate (RENVELA) 800 mg tablet, Take 1 tablet (800 mg total) by mouth in the morning and 1 tablet (800 mg total) at noon and 1 tablet (800 mg total) in the evening  Take with meals  , Disp: 270 tablet, Rfl: 3    simvastatin (ZOCOR) 20 mg tablet, Take 1 tablet (20 mg total) by mouth daily at bedtime, Disp: 90 tablet, Rfl: 3    tamsulosin (FLOMAX) 0 4 mg, TAKE 1 CAPSULE BY MOUTH EVERY DAY WITH DINNER, Disp: 90 capsule, Rfl: 3    torsemide (DEMADEX) 20 mg tablet, Take 4 tablets (80 mg total) by mouth 4 (four) times a week Take on non-dialysis days only, Disp: 90 tablet, Rfl: 3    apixaban (ELIQUIS) 5 mg, Take 1 tablet (5 mg total) by mouth every 12 (twelve) hours (Patient taking differently: Take by mouth every 12 (twelve) hours), Disp: 180 tablet, Rfl: 3    doxycycline (ADOXA) 100 MG tablet, Take 1 tablet (100 mg total) by mouth 2 (two) times a day for 7 days (Patient not taking: Reported on 8/16/2022), Disp: 14 tablet, Rfl: 0  Allergies   Allergen Reactions    Invokana [Canagliflozin] Other (See Comments)     Caused circulation problems    Lamisil [Terbinafine] Blisters     Wife states " His skin peeled from head to toe"    Other Swelling     Pomegranate - facial swelling, no swelling of tongue, esophagus  Adhesive tape        Latex Rash     Past Medical History:   Diagnosis Date    Anemia     iron def    Arthritis     OA    Bruise of both arms     forearms and both hands    Bruises easily     [email or web address] ) 2021    colon    [facility]estive [facility] failure) [facility]     Chronic kidney disease     CPAP (continuous positive airway pressure) dependence     Diabetes mellitus ([email or web address]     Diabetic foot ulcer ([email or web address]     Eczema     Erectile dysfunction     Gout     Heart murmur     History of permanent cardiac pacemaker placement 2018    History of transfusion     Hyperlipidemia     Hypertension     Hypoglycemia 2019    Murmur     Nephropathy     Osteoarthritis     Pacemaker 2018    PAD (peripheral artery disease) ([facility])     Seasonal allergies     Sleep apnea     Toe infection     bilat great toes    Vertigo     Walks frequently     Wears dentures     upper    Wears glasses            Social History     Tobacco Use   Smoking Status Former Smoker    Packs/day: 0 50    Years: 20 00    Pack years: 10 00    Types: Cigarettes    Start date: 1    Quit date:     Years since quittin 6   Smokeless Tobacco Never Used   Tobacco Comment    quit 10 years ago

## 2022-08-16 NOTE — PROGRESS NOTES
Assessment/Plan:    No problem-specific Assessment & Plan notes found for this encounter  Diagnoses and all orders for this visit:    Skin ulcer of toe of left foot with fat layer exposed ([facility] [address] )  -     NM ceretec abscess localization limited; Future  -     Cancel: Wound cleansing and dressings; Future  -     Wound cleansing and dressings; Future    Cellulitis of left foot    Diabetic polyneuropathy associated with type 2 diabetes mellitus ([facility] [address] )      -x-ray was read as negative, however there is a bit of fragmentation along the 2nd metatarsal head on the lateral aspect  With some minor decrease in bone density  -I will obtain an MRI to rule out bony infection of the left 2nd metatarsal     -if this does come back positive he will benefit from a transmetatarsal amputation which will hopefully be done on outpatient basis  No debridement today, continue antibiotics    Subjective:      Patient ID: Jose Leyva is a 74 y o  male  Patient presents for evaluation management of his left foot, did take antibiotics as directed has been performing the packing and wound care as directed  Offers no other pedal complaints he is worsened controlling his blood sugar  The following portions of the patient's history were reviewed and updated as appropriate: allergies, current medications, past family history, past medical history, past social history, past surgical history and problem list     Review of Systems   Constitutional: Negative for chills and fever  HENT: Negative for ear pain and sore throat  Eyes: Negative for pain and visual disturbance  Respiratory: Negative for cough and shortness of breath  Cardiovascular: Negative for chest pain and palpitations  Gastrointestinal: Negative for abdominal pain and vomiting  Genitourinary: Negative for dysuria and hematuria  Musculoskeletal: Negative for arthralgias and back pain  Skin: Negative for color change and rash     Neurological: Negative for seizures and syncope  All other systems reviewed and are negative  Objective:      /62   Pulse 96   Temp 97 8 °F (36 6 °C)   Resp 18          Physical Exam  Vitals reviewed  Constitutional:       Appearance: Normal appearance  HENT:      Head: Normocephalic and atraumatic  Nose: Nose normal       Mouth/Throat:      Mouth: Mucous membranes are moist    Eyes:      Pupils: Pupils are equal, round, and reactive to light  Pulmonary:      Effort: Pulmonary effort is normal    Musculoskeletal:         General: Swelling present  Comments: There is an ulceration at the toe amputation site of the left 2nd digit which does probe to bone, cellulitis has improved, swelling has improved  Malodor has resolved  Skin:     Capillary Refill: Capillary refill takes 2 to 3 seconds  Neurological:      General: No focal deficit present  Mental Status: He is alert and oriented to person, place, and time  Mental status is at baseline

## 2022-08-18 ENCOUNTER — OFFICE VISIT (OUTPATIENT)
Dept: VASCULAR SURGERY | Facility: CLINIC | Age: 67
End: 2022-08-18

## 2022-08-18 ENCOUNTER — TELEPHONE (OUTPATIENT)
Dept: INFECTIOUS DISEASES | Facility: CLINIC | Age: 67
End: 2022-08-18

## 2022-08-18 ENCOUNTER — OFFICE VISIT (OUTPATIENT)
Dept: INFECTIOUS DISEASES | Facility: CLINIC | Age: 67
End: 2022-08-18
Payer: MEDICARE

## 2022-08-18 ENCOUNTER — PREP FOR PROCEDURE (OUTPATIENT)
Dept: INTERVENTIONAL RADIOLOGY/VASCULAR | Facility: CLINIC | Age: 67
End: 2022-08-18

## 2022-08-18 VITALS
TEMPERATURE: 97.3 F | OXYGEN SATURATION: 94 % | DIASTOLIC BLOOD PRESSURE: 60 MMHG | SYSTOLIC BLOOD PRESSURE: 130 MMHG | BODY MASS INDEX: 27.92 KG/M2 | RESPIRATION RATE: 17 BRPM | HEIGHT: 70 IN | HEART RATE: 105 BPM | WEIGHT: 195 LBS

## 2022-08-18 VITALS
BODY MASS INDEX: 27.92 KG/M2 | SYSTOLIC BLOOD PRESSURE: 140 MMHG | WEIGHT: 195 LBS | HEIGHT: 70 IN | DIASTOLIC BLOOD PRESSURE: 72 MMHG | HEART RATE: 60 BPM

## 2022-08-18 DIAGNOSIS — L08.9 DIABETIC INFECTION OF LEFT FOOT (HCC): ICD-10-CM

## 2022-08-18 DIAGNOSIS — I77.0 AVF (ARTERIOVENOUS FISTULA) (HCC): Primary | ICD-10-CM

## 2022-08-18 DIAGNOSIS — Z98.890 S/P ARTERIOVENOUS (AV) FISTULA CREATION: Primary | ICD-10-CM

## 2022-08-18 DIAGNOSIS — B95.61 STAPHYLOCOCCUS AUREUS BACTEREMIA: Primary | ICD-10-CM

## 2022-08-18 DIAGNOSIS — Z79.2 LONG TERM (CURRENT) USE OF ANTIBIOTICS: ICD-10-CM

## 2022-08-18 DIAGNOSIS — N18.6 END STAGE RENAL DISEASE ON DIALYSIS (HCC): ICD-10-CM

## 2022-08-18 DIAGNOSIS — R78.81 STAPHYLOCOCCUS AUREUS BACTEREMIA: Primary | ICD-10-CM

## 2022-08-18 DIAGNOSIS — Z98.890 S/P ARTERIOVENOUS (AV) FISTULA CREATION: ICD-10-CM

## 2022-08-18 DIAGNOSIS — E11.40 TYPE 2 DIABETES MELLITUS WITH DIABETIC NEUROPATHY, WITHOUT LONG-TERM CURRENT USE OF INSULIN (HCC): ICD-10-CM

## 2022-08-18 DIAGNOSIS — E11.628 DIABETIC INFECTION OF LEFT FOOT (HCC): ICD-10-CM

## 2022-08-18 DIAGNOSIS — Z99.2 END STAGE RENAL DISEASE ON DIALYSIS (HCC): ICD-10-CM

## 2022-08-18 PROCEDURE — 99024 POSTOP FOLLOW-UP VISIT: CPT | Performed by: SURGERY

## 2022-08-18 PROCEDURE — 99214 OFFICE O/P EST MOD 30 MIN: CPT | Performed by: INTERNAL MEDICINE

## 2022-08-18 NOTE — ASSESSMENT & PLAN NOTE
ESRD on HD via permacath with history of LUE radiocephalic AVF creation 1/6/95  There is an excellent thrill overlying the AVF  He denies steal symptoms or significant swelling  He notes some loose skin on his underarm but denies any symptoms associated with it  Hemodialysis access scan demonstrated inadequate flow volumes and diameter of the AVF  There are some elevated velocities within the mid-proximal forearm though the cephalic vein otherwise appears widely patent  There is adequate depth of the AVF <6mm  -Discussed non-maturation of his LUE AVF which may be secondary to outflow stenosis or AV anastomotic stenosis  There may also be several tributaries arising from the AVF based on clinical exam which may be siphoning flow from the AVF and preventing it from fully maturing   -Recommend LUE fistulogram for further evaluation with IR   May require intervention as deemed necessary per IR   -May require further surgical intervention with ligation of tributaries   -Will schedule follow-up in 2 months pending findings from fistulogram

## 2022-08-18 NOTE — PATIENT INSTRUCTIONS
S/P arteriovenous (AV) fistula creation  ESRD on HD via permacath with history of LUE radiocephalic AVF creation 1/9/90  There is an excellent thrill overlying the AVF  He denies steal symptoms or significant swelling  He notes some loose skin on his underarm but denies any symptoms associated with it  Hemodialysis access scan demonstrated inadequate flow volumes and diameter of the AVF  There are some elevated velocities within the mid-proximal forearm though the cephalic vein otherwise appears widely patent  There is adequate depth of the AVF <6mm  -Discussed non-maturation of his LUE AVF which may be secondary to outflow stenosis or AV anastomotic stenosis  There may also be several tributaries arising from the AVF based on clinical exam which may be siphoning flow from the AVF and preventing it from fully maturing   -Recommend LUE fistulogram for further evaluation with IR   May require intervention as deemed necessary per IR   -May require further surgical intervention with ligation of tributaries   -Will schedule follow-up in 2 months pending findings from fistulogram

## 2022-08-18 NOTE — TELEPHONE ENCOUNTER
Dorothy Zacarias today regarding pts labs  They stated that the labs were drawn Wednesday and they will be resulted on Friday

## 2022-08-18 NOTE — PROGRESS NOTES
Progress Note - Infectious Disease   Radha Gracia 74 y o  male MRN: [id number]   Encounter: [id number]    Impression/Plan:  1  MSSA bacteremia  Due to left foot cellulitis  Repeat blood cultures negative  Challenging situation as patient has underlying PPM, left shoulder hardware, right chest diaylsis catheter and PortACath  TTE no vegetations but not diagnostic  Patient does not wish to undergo major surgery, PPM removal  He would favor empiric treatment course and then invasive testing/treatments (ELBERT if infection relapses)  PortACath removed 7/12/22  Nephrology wishes to attempt HD catheter salvage given poor access               -continue IV cefazolin 2g/2g/3g dosed with HD MWF              -[facility] a 6 week course of IV antibiotics through 8/19/22              -check surveillance blood cultures 2 weeks after completion of antibiotics on 9/2/22               -Ideally HD catheter would also be removed or exchanged; however, it was salvaged per patient's and nephrology services wishes  Catheter should be removed if infection recurs               -ID follow up in 2 weeks     2  Left foot cellulitis and abscess  Status post bedside debridement by Podiatry 7/10  Cerectec scan nondiagnostic of osteomyelitis  S/p operative debridement and 2nd toe amputation on 7/16  Wound dehiscence is reported at the left 2nd digit amputation site per Podiatry service on 08/09/2022 which probes directly to bone of the 2nd metatarsal head  Discussed with Dr  [facility], who is concerned about possible residual osteomyelitis 2nd metatarsal head               -Podiatry follow-up with Dr  [facility] advised, pt has appt early next week   -Ceretec scan ordered for 8/21/22 per podiatry to assess for osteomyelitis  If pt has osteomyelitis, he may need LT foot TMA per Dr  [facility]                  3  Type 2 diabetes mellitus  Risk factor for infection, poor wound healing   HbA1c of 6 0 on 2/18/22               -Glycemic control to aid with wound healing as per primary team     4  Sigmoid cancer  Last cycle chemotherapy was 7/5 via R chest port-a-cath  Risk factor for immune suppression              -Management per Heme-Onc     5  ESRD on dialysis  Via R permacath with no local signs of infection  Patient and nephrology wish to attempt catheter salvage                -VOBJPIEAJG per nephrology, Dr Matt James    My recommendations were discussed with the patient and his wife in detail who verbalized understanding  Pt care coordinated with Dr Isa Betts from podiatry service  Antibiotics: cefazolin iv    Current Outpatient Medications:     allopurinol (ZYLOPRIM) 300 mg tablet, Take 1 tablet (300 mg total) by mouth every morning, Disp: 90 tablet, Rfl: 0    apixaban (ELIQUIS) 5 mg, Take 1 tablet (5 mg total) by mouth every 12 (twelve) hours (Patient taking differently: Take by mouth every 12 (twelve) hours), Disp: 180 tablet, Rfl: 3    b complex-vitamin C-folic acid (NEPHROCAPS) 1 mg capsule, Take 1 capsule by mouth in the morning , Disp: 90 capsule, Rfl: 3    ceFAZolin (ANCEF) 1000 mg IVPB, 2g/2g/3g dosed after HD M/W/F, Disp: 1 each, Rfl: 0    Dupilumab (Dupixent) 300 MG/2ML SOPN, Inject 300 mg under the skin Once every 2 weeks, Disp: , Rfl:     famotidine (PEPCID) 40 MG tablet, TAKE 1 TABLET BY MOUTH EVERY DAY, Disp: 90 tablet, Rfl: 3    Insulin Degludec-Liraglutide (Xultophy) 100 units-3 6 mg/mL injection pen, Inject 19 units daily  , Disp: 18 mL, Rfl: 0    Insulin Pen Needle (BD Pen Needle Zenaida U/F) 32G X 4 MM MISC, Use 1 daily, Disp: 100 each, Rfl: 2    metoprolol tartrate (LOPRESSOR) 50 mg tablet, Take 1 tablet (50 mg total) by mouth every 12 (twelve) hours, Disp: 180 tablet, Rfl: 3    midodrine (PROAMATINE) 5 mg tablet, Take 1 tablet (5 mg total) by mouth 3 (three) times a week Take before dialysis, Disp: 30 tablet, Rfl: 2    omega-3-acid ethyl esters (LOVAZA) 1 g capsule, Take 2 capsules (2 g total) by mouth 2 (two) times a day Mail print prescription to pt, Disp: 360 capsule, Rfl: 3    OneTouch Ultra test strip, USE TO TEST BLOOD SUGAR 3 TIMES A DAY, Disp: 100 each, Rfl: 3    sevelamer carbonate (RENVELA) 800 mg tablet, Take 1 tablet (800 mg total) by mouth in the morning and 1 tablet (800 mg total) at noon and 1 tablet (800 mg total) in the evening  Take with meals  , Disp: 270 tablet, Rfl: 3    simvastatin (ZOCOR) 20 mg tablet, Take 1 tablet (20 mg total) by mouth daily at bedtime, Disp: 90 tablet, Rfl: 3    tamsulosin (FLOMAX) 0 4 mg, TAKE 1 CAPSULE BY MOUTH EVERY DAY WITH DINNER, Disp: 90 capsule, Rfl: 3    torsemide (DEMADEX) 20 mg tablet, Take 4 tablets (80 mg total) by mouth 4 (four) times a week Take on non-dialysis days only, Disp: 90 tablet, Rfl: 3    Subjective:  20-year-old man seen for office follow-up after hospital discharge from [facility] on 7/17/2022   During that hospitalization, patient was seen by the Infectious Disease service for evaluation of sepsis and MSSA bacteremia which was felt to be due to left foot cellulitis and abscess   Patient was treated with cefazolin IV during his hospitalization   Upon hospital discharge, a 6 week treatment course was advised thru August 19th   Currently patient is receiving cefazolin IV 3 times a week with dialysis  Pt says he feels fine  He has been tolerating antibiotic therapy  Pt was seen by his podiatrist 2 days who was concerned about delayed wound healing  The wound is decreasing in size  The wick remains in place  He reports ongoing watery drainage mixed with blood without malodor  He denies fevers, chills, or sweats  Patient denies nausea, vomiting, diarrhea, or rash  Objective:  Vitals:  Vitals:    08/18/22 1540   BP: 130/60   Pulse: 105   Resp: 17   Temp: (!) 97 3 °F (36 3 °C)   SpO2: 94%   Weight: 88 5 kg (195 lb)   Height: 5' 10" (1 778 m)     Physical Exam:   General Appearance:  Alert, interactive, nontoxic, no acute distress     Throat: Deferred as patient is wearing a facemask    Lungs:   Clear to auscultation bilaterally; no wheezes, respirations unlabored   Heart:  S1, S2, RRR; no murmur  LT ACW PPM noted   Abdomen:   Soft, non-tender, non-distended   Extremities: No distal leg edema b/l   Neurologic: AAO to person, surroundings, conversant, fluent speech   Skin: LT foot wound dressing intact  RT ACW tunneled dialysis catheter intact  No rash  Labs, Imaging, & Other studies:   All pertinent labs, cultures and imaging studies were personally reviewed  7/28/22: WBC 7 9, Hb 9 2, HCT 29 8, creatinine 3 78  8/11/22 XR LT foot: No radiographic evidence of osteomyelitis  Stable postoperative change

## 2022-08-18 NOTE — PROGRESS NOTES
Assessment/Plan:    S/P arteriovenous (AV) fistula creation  ESRD on HD via permacath with history of LUE radiocephalic AVF creation 9/0/29  There is an excellent thrill overlying the AVF  He denies steal symptoms or significant swelling  He notes some loose skin on his underarm but denies any symptoms associated with it  Hemodialysis access scan demonstrated inadequate flow volumes and diameter of the AVF  There are some elevated velocities within the mid-proximal forearm though the cephalic vein otherwise appears widely patent  There is adequate depth of the AVF <6mm  -Discussed non-maturation of his LUE AVF which may be secondary to outflow stenosis or AV anastomotic stenosis  There may also be several tributaries arising from the AVF based on clinical exam which may be siphoning flow from the AVF and preventing it from fully maturing   -Recommend LUE fistulogram for further evaluation with IR  May require intervention as deemed necessary per IR   -May require further surgical intervention with ligation of tributaries   -Will schedule follow-up in 2 months pending findings from fistulogram        Diagnoses and all orders for this visit:    AVF (arteriovenous fistula) ([facility] [address] )  -     IR AV fistulagram/graftogram; Future  -     Basic metabolic panel; Future    S/P arteriovenous (AV) fistula creation    Other orders  -     Nursing communication Apply gown prior to procedure; Standing  -     Have Patient Void On Call to Procedure Room; Standing  -     Insert and Maintain IV; Standing        I have spent 25 minutes with Patient  today in which greater than 50% of this time was spent in counseling/coordination of care regarding Intructions for management, Importance of tx compliance and Impressions  Subjective:      Patient ID: Emani Speaker is a 74 y o  male  Pt is here to rev HEMO DUPLEX 7/21/22  Pt says he has some swelling in LA but denies  pain or drainage  Pt goes to [facility] M[facility]F   Pt is using permcath for dialysis  Pt is taking Eliquis and Simvastatin  Pt is a former smoker  There is a thrill and bruit  HPI  Mr Matteo Barnard is a 65yo male with ESRD on HD via permacath s/p LUE AVF creation 6/1/22  He has minimal complaints  He reports mild swelling in the left arm but otherwise denies paresthesias or pain  The following portions of the patient's history were reviewed and updated as appropriate: allergies, current medications, past family history, past medical history, past social history, past surgical history and problem list     Review of Systems   Constitutional: Negative  HENT: Negative  Eyes: Negative  Respiratory: Negative  Cardiovascular: Positive for leg swelling (la swelling)  Gastrointestinal: Negative  Endocrine: Negative  Genitourinary: Negative  Musculoskeletal: Negative  Skin: Negative  Allergic/Immunologic: Negative  Neurological: Negative  Hematological: Negative  Psychiatric/Behavioral: Negative  I have personally reviewed the ROS entered by MA and agree as documented  Objective:      /72 (BP Location: Right arm, Patient Position: Sitting, Cuff Size: Standard)   Pulse 60   Ht 5' 10" (1 778 m)   Wt 88 5 kg (195 lb)   BMI 27 98 kg/m²          Physical Exam  Constitutional:       Appearance: Normal appearance  HENT:      Head: Normocephalic and atraumatic  Cardiovascular:      Rate and Rhythm: Normal rate  Pulses:           Radial pulses are 2+ on the left side  Arteriovenous access: left arteriovenous access is present  Comments: Palpable thrill over LUE radiocephalic AVF  Pulmonary:      Effort: Pulmonary effort is normal    Abdominal:      Palpations: Abdomen is soft  Musculoskeletal:         General: Normal range of motion  Cervical back: Normal range of motion and neck supple  Skin:     General: Skin is warm and dry  Capillary Refill: Capillary refill takes less than 2 seconds     Neurological: General: No focal deficit present  Mental Status: He is alert and oriented to person, place, and time  Psychiatric:         Mood and Affect: Mood normal          Behavior: Behavior normal          Thought Content:  Thought content normal          Judgment: Judgment normal

## 2022-08-18 NOTE — PATIENT INSTRUCTIONS
Finish your antibiotics tomorrow as scheduled  Please have blood cultures to be done two weeks after completion  We would like to see you in two weeks

## 2022-08-22 ENCOUNTER — HOSPITAL ENCOUNTER (OUTPATIENT)
Dept: NUCLEAR MEDICINE | Facility: HOSPITAL | Age: 67
Discharge: HOME | End: 2022-08-22
Attending: PODIATRIST
Payer: MEDICARE

## 2022-08-22 DIAGNOSIS — L97.522 SKIN ULCER OF TOE OF LEFT FOOT WITH FAT LAYER EXPOSED (HCC): ICD-10-CM

## 2022-08-22 PROCEDURE — 78800 RP LOCLZJ TUM 1 AREA 1 D IMG: CPT

## 2022-08-22 PROCEDURE — A9569 TECHNETIUM TC-99M AUTO WBC: HCPCS

## 2022-08-23 ENCOUNTER — PATIENT MESSAGE (OUTPATIENT)
Dept: WOUND CARE | Facility: CLINIC | Age: 67
End: 2022-08-23

## 2022-08-23 ENCOUNTER — TELEPHONE (OUTPATIENT)
Dept: HEMATOLOGY ONCOLOGY | Facility: CLINIC | Age: 67
End: 2022-08-23

## 2022-08-23 ENCOUNTER — HOSPITAL ENCOUNTER (OUTPATIENT)
Dept: NUCLEAR MEDICINE | Facility: HOSPITAL | Age: 67
Discharge: HOME | End: 2022-08-23
Attending: PODIATRIST
Payer: MEDICARE

## 2022-08-23 ENCOUNTER — OFFICE VISIT (OUTPATIENT)
Dept: WOUND CARE | Facility: CLINIC | Age: 67
End: 2022-08-23
Payer: MEDICARE

## 2022-08-23 ENCOUNTER — APPOINTMENT (OUTPATIENT)
Dept: LAB | Facility: CLINIC | Age: 67
End: 2022-08-23
Payer: MEDICARE

## 2022-08-23 VITALS — HEART RATE: 76 BPM | DIASTOLIC BLOOD PRESSURE: 72 MMHG | SYSTOLIC BLOOD PRESSURE: 138 MMHG | TEMPERATURE: 98.2 F

## 2022-08-23 DIAGNOSIS — L97.522 SKIN ULCER OF TOE OF LEFT FOOT WITH FAT LAYER EXPOSED (HCC): Primary | ICD-10-CM

## 2022-08-23 DIAGNOSIS — M86.472 CHRONIC OSTEOMYELITIS OF LEFT FOOT WITH DRAINING SINUS (HCC): ICD-10-CM

## 2022-08-23 DIAGNOSIS — N18.4 TYPE 2 DIABETES MELLITUS WITH STAGE 4 CHRONIC KIDNEY DISEASE, WITH LONG-TERM CURRENT USE OF INSULIN (HCC): ICD-10-CM

## 2022-08-23 DIAGNOSIS — E11.621 DIABETIC ULCER OF RIGHT GREAT TOE (HCC): ICD-10-CM

## 2022-08-23 DIAGNOSIS — E11.22 TYPE 2 DIABETES MELLITUS WITH STAGE 4 CHRONIC KIDNEY DISEASE, WITH LONG-TERM CURRENT USE OF INSULIN (HCC): ICD-10-CM

## 2022-08-23 DIAGNOSIS — L97.519 DIABETIC ULCER OF RIGHT GREAT TOE (HCC): ICD-10-CM

## 2022-08-23 DIAGNOSIS — L03.116 CELLULITIS OF LEFT FOOT: ICD-10-CM

## 2022-08-23 DIAGNOSIS — Z79.4 TYPE 2 DIABETES MELLITUS WITH STAGE 4 CHRONIC KIDNEY DISEASE, WITH LONG-TERM CURRENT USE OF INSULIN (HCC): ICD-10-CM

## 2022-08-23 LAB
EST. AVERAGE GLUCOSE BLD GHB EST-MCNC: 111 MG/DL
HBA1C MFR BLD: 5.5 %

## 2022-08-23 PROCEDURE — 99213 OFFICE O/P EST LOW 20 MIN: CPT | Performed by: PODIATRIST

## 2022-08-23 PROCEDURE — 99214 OFFICE O/P EST MOD 30 MIN: CPT | Performed by: PODIATRIST

## 2022-08-23 PROCEDURE — 83036 HEMOGLOBIN GLYCOSYLATED A1C: CPT

## 2022-08-23 RX ORDER — CHLORHEXIDINE GLUCONATE 0.12 MG/ML
15 RINSE ORAL ONCE
Status: CANCELLED | OUTPATIENT
Start: 2022-08-23 | End: 2022-08-23

## 2022-08-23 NOTE — PROGRESS NOTES
Assessment/Plan:    No problem-specific Assessment & Plan notes found for this encounter  Diagnoses and all orders for this visit:    Skin ulcer of toe of left foot with fat layer exposed ([facility] [address] )  -     Wound cleansing and dressings; Future  -     CBC and Platelet; Future  -     Comprehensive metabolic panel; Future    Cellulitis of left foot  -     Wound cleansing and dressings; Future  -     CBC and Platelet; Future  -     Comprehensive metabolic panel; Future    Diabetic ulcer of right great toe (HCC)  -     Wound cleansing and dressings; Future  -     CBC and Platelet; Future  -     Comprehensive metabolic panel; Future    Chronic osteomyelitis of left foot with draining sinus ([facility] [address] )  -     Case request operating room: RAY RESECTION FOOT; Standing  -     CBC and Platelet; Future  -     Comprehensive metabolic panel; Future  -     Case request operating room: RAY RESECTION FOOT    Other orders  -     Nursing Communication [address] [place] [address] Interventions Implemented; Standing  -     Nursing Communication G bath, have staff wash entire body (neck down) per pre-op bathing protocol  Routine, evening prior to, and day of surgery ; Standing  -     Nursing Communication Swab both nares with Povidone-Iodine solution, EXCLUDE if patient has shellfish/Iodine allergy  Routine, day of surgery, on call to OR; Standing  -     chlorhexidine (PERIDEX) 0 12 % oral rinse 15 mL  -     Void on call to OR; Standing  -     Insert peripheral IV;  Standing  -     Diet NPO; Sips with meds; Standing      -nuclear medicine scan reviewed and does show changes concerning of osteomyelitis of the 2nd metatarsal, this is expected based on the clinical appearance of the foot   -we discussed multiple options for management with this patient, and he would prefer to have this managed on an outpatient basis and attempt to maintain his activity at all cost     -I discussed from a functional standpoint that he would benefit from having a transmetatarsal amputation but due to his cancer he is concentrating on today rather than in 10 years from now  -we will plan for left 2nd metatarsal partial ray resection with bone cultures from the proximal metatarsal   Dr Britt Hannah from Infectious Disease is aware and he may need 6 weeks of IV abx if the proximal margin does return dirty  - Will set this surgery up with Dr Kendrick Lombardo on an outpatient basis and this was discussed with the patient at length  - Will plan for WBAT in sx shoe following surgery with outpatient follow-up on cultures  - I discussed that he may wish to avoid port placement for now with a diagnosed chronic bone infection, advised him to speak with Dr Britt Hannah    Subjective:      Patient ID: Shane Welch is a 74 y o  male  Patient presents for evaluation and management of left foot infection, he did have a nuclear medicine study done between his last appointment this appointment  As he is not able to have an MRI  Notes no acute changes in the left foot  States he is having a port placed on Tuesday next week and has no other issues  The following portions of the patient's history were reviewed and updated as appropriate: allergies, current medications, past family history, past medical history, past social history, past surgical history and problem list     Review of Systems   Constitutional: Negative for chills and fever  HENT: Negative for ear pain and sore throat  Eyes: Negative for pain and visual disturbance  Respiratory: Negative for cough and shortness of breath  Cardiovascular: Negative for chest pain and palpitations  Gastrointestinal: Negative for abdominal pain and vomiting  Genitourinary: Negative for dysuria and hematuria  Musculoskeletal: Negative for arthralgias and back pain  Skin: Negative for color change and rash  Neurological: Negative for seizures and syncope  All other systems reviewed and are negative          Objective:      /72   Pulse 76   Temp 98 2 °F (36 8 °C)          Physical Exam  Vitals reviewed  Constitutional:       Appearance: Normal appearance  HENT:      Head: Normocephalic and atraumatic  Nose: Nose normal       Mouth/Throat:      Mouth: Mucous membranes are moist    Pulmonary:      Effort: Pulmonary effort is normal    Musculoskeletal:         General: Swelling and deformity present  Comments: Ulceration on the dehiscence site of the left 2nd toe amputation with probe to bone with granular and fibrotic base with some evidence soft tissue swelling of the left foot  Skin:     Capillary Refill: Capillary refill takes 2 to 3 seconds  Neurological:      General: No focal deficit present  Mental Status: He is alert and oriented to person, place, and time

## 2022-08-23 NOTE — TELEPHONE ENCOUNTER
Phoned pt's wife Orlando Yeboah to ask for an update on pt  Pt saw Dr Rose Marie Humphrey on 7/27/22 and a Pet Ct scan was ordered, however it had to be cancelled due to pt needing surgery on his foot and other issues  Wife stated if pt is ever well enough for treatment  she will reach out to office  Pt was to F/U with Dr Rose Marie Humphrey in two weeks but since pet Scan was cancelled there is no F/u scheduled

## 2022-08-23 NOTE — PATIENT INSTRUCTIONS
Orders Placed This Encounter   Procedures    Wound cleansing and dressings     Wound cleansing and dressings       Left foot ulcer:   Wash your hands with soap and water  Remove old dressing, discard into plastic bag and place in trash  Cleanse the wound with normal saline solution prior to applying a clean dressing  Do not use tissue or cotton balls  Do not scrub the wound  Pat dry using gauze  Shower no   Apply AMD packing to the left foot wound  Cover with gauze  Secure with sergio and tape   Change dressing every other day  Right great toe ulcer:   Wash your hands with soap and water  Remove old dressing, discard into plastic bag and place in trash  Cleanse the wound with normal saline solution prior to applying a clean dressing  Do not use tissue or cotton balls  Do not scrub the wound  Pat dry using gauze  Shower no   Apply Dermagran gauze to the right toe wound  Cover with gauze  Secure with tape  Change dressing every other day  Report to ER for nausea, vomiting, chills, fever, redness in foot and increased warmth in foot    We will set you up for surgery within the next few days    Follow up in two weeks     Standing Status:   Future     Standing Expiration Date:   8/23/2023

## 2022-08-24 ENCOUNTER — TELEPHONE (OUTPATIENT)
Dept: PODIATRY | Facility: CLINIC | Age: 67
End: 2022-08-24

## 2022-08-24 ENCOUNTER — PREP FOR PROCEDURE (OUTPATIENT)
Dept: PODIATRY | Facility: CLINIC | Age: 67
End: 2022-08-24

## 2022-08-24 NOTE — TELEPHONE ENCOUNTER
Junior Loving called, she was returning a call from Connie  I tried to forward the number but I had to leave a voicemail    Please call her regarding Alexandr's upcoming surgery on 8/29/22

## 2022-08-24 NOTE — PROGRESS NOTES
Established Patient Progress Note      Chief Complaint   Patient presents with    Diabetes Type 2        History of Present Illness:   Grace yLon is a 74 y o  male with HTN, HLD, NICOLASA, and type 2 diabetes with long term use of insulin  Reports complications of neuropathy and CKD with macroalbuminuria and diabetic foot ulcer  He was admitted to the hospital on 07/07/2022 for diabetic infection of the left foot, sepsis, acute respiratory failure, end-stage renal disease  Denies recent severe hypoglycemic or severe hyperglycemic episodes  Denies any issues with his current regimen  home glucose monitoring: are performed sporadically, approximately 4-5x weekly    Patient is currently receiving hemodialysis on Monday, Wednesday, Friday  He reports he is tolerating this well  He does report fatigue after his sessions  Denies any episodes of hypoglycemia  At patient's last appointment on 02/24/2022, no changes were made to his regimen as he was well controlled  He has an upcoming appointment on 09/02/2022 for a ray resection of his left foot due to chronic osteomyelitis  Component      Latest Ref Rng & Units 12/14/2021 2/18/2022 8/23/2022           8:27 AM  8:29 AM  8:08 AM   Hemoglobin A1C      Normal 3 8-5 6%; PreDiabetic 5 7-6 4%; Diabetic >=6 5%; Glycemic control for adults with diabetes <7 0% % 5 7 (H) 6 0 (H) 5 5   eAG, EST AVG Glucose      mg/dl 117 126 111     Component      Latest Ref Rng & Units 7/17/2022 8/23/2022           4:53 AM  8:08 AM   eGFR      ml/min/1 73sq m 23 15       Home blood glucose readings:  Checking once daily-denies going below 85 mg/dL; reports feeling    Current regimen:   Xultophy 19 units daily      Last Eye Exam: March 2022  Last Foot Exam:  Up-to-date    For HLD, he is taking 20 mg of simvastatin nightly and 2g of lovaza daily  Denies myalgias  For HTN, he is taking metoprolol tartrate twice daily  He denies headache and orthostatic hypotension   For NICOLASA, he uses his CPAP faithfully       Patient Active Problem List   Diagnosis    Bilateral leg edema    Diabetic ulcer of toe of left foot associated with type 2 diabetes mellitus (HCC)    Easy bruising    Eczema    Erectile dysfunction of non-organic origin    Hyperlipidemia    Uremic acidosis    Arthritis    Peripheral arterial disease (HCC)    PVCs (premature ventricular contractions)    Rhinitis    Systolic murmur    Vertigo    Complete heart block (HCC)    Metabolic acidosis    ELZBIETA (acute kidney injury) (HCC)    Other hyperlipidemia    Persistent atrial fibrillation (HCC)    Persistent proteinuria    Volume overload    Urinary retention    NICOLASA (obstructive sleep apnea)    Type 2 diabetes mellitus with chronic kidney disease, with long-term current use of insulin (HCC)    Hypertension    Stage 4 chronic kidney disease (HCC)    Aortic valve stenosis, nonrheumatic    Anemia    Fever    Mitral stenosis    Abnormal CT of the chest    Hyperkalemia    Acute pulmonary edema (HCC)    Chronic diastolic [facility]congestive[facility] failure [facility]    Left renal artery stenosis (HCC)    S/P amputation of lesser toe, left (HCC)    S/P amputation of lesser toe, right (HCC)    Elevated troponin I level    Staphylococcus aureus bacteremia    Type 2 diabetes mellitus with diabetic neuropathy, without long-term current use of insulin (HCC)    Hyponatremia    Iron deficiency anemia    Anxiety    [facility]    Amputation of left great toe ([facility]    Multiple drug resistant organism (MDRO) culture positive    Renovascular hypertension    SSS (sick sinus syndrome) (HCC)    Chronic obstructive pulmonary disease (HCC)    Absence of toe (HCC)    Chronic painful diabetic neuropathy (HCC)    Onychomycosis    Colon cancer (HCC)    Acute kidney injury superimposed on chronic kidney disease [facility]    RSV (respiratory syncytial virus pneumonia)    Chemotherapy-induced neutropenia ([facility]    Chemotherapy-induced nausea    Acute on chronic diastolic congestive heart failure (HCC)    Other iron deficiency anemias    Pulmonary hypertension (HCC)    S/P arteriovenous (AV) fistula creation    Severe sepsis with lactic acidosis (HCC)    Diabetic infection of left foot (HCC)    Gram-positive bacteremia    Acute respiratory failure with hypoxia (HCC)    End stage renal disease on dialysis [facility])      Past Medical History:   Diagnosis Date    Anemia     iron def    Arthritis     OA    Bruise of both arms     forearms and both hands    Bruises easily     Cancer [facility] 09/01/2021    colon    CHF [facility]congestive heart failure) [facility]     Chronic kidney disease     CPAP (continuous positive airway pressure) dependence     Diabetes mellitus ([facility]     Diabetic foot ulcer [facility]     Eczema     Erectile dysfunction     Gout     Heart murmur     History of permanent cardiac pacemaker placement 11/2018    History of transfusion     Hyperlipidemia     Hypertension     Hypoglycemia 03/08/2019    Murmur     Nephropathy     Osteoarthritis     Pacemaker 11/06/2018    PAD (peripheral artery disease) ([facility]     Seasonal allergies     Toe infection     bilat great toes    Vertigo     Walks frequently     Wears dentures     upper    Wears glasses       Past Surgical History:   Procedure Laterality Date    CARDIAC PACEMAKER PLACEMENT      CARPAL TUNNEL RELEASE Left     CARPAL TUNNEL RELEASE Right     CARPAL TUNNEL RELEASE      COLONOSCOPY  08/2020    COLONOSCOPY      FL GUIDED CENTRAL VENOUS ACCESS DEVICE INSERTION  01/11/2022    FOOT AMPUTATION Left 02/10/2020    Procedure: PARTIAL 1ST RAY AMPUTATION;  Surgeon: Yovanny Parada DPM;  [facility];  Service: Podiatry    INCISION AND DRAINAGE OF WOUND Left 01/12/2020    Procedure: INCISION AND DRAINAGE (I&D) EXTREMITY AND REMOVAL OF SESMOID BONE;  Surgeon: Ankush Armstrong DPM;  [facility];  Service: Podiatry    IR OTHER  01/21/2022  IR PICC REPOSITION  01/14/2020    IR PORT REMOVAL  7/12/2022    IR TUNNELED DIALYSIS CATHETER PLACEMENT  04/26/2022    KNEE ARTHROSCOPY Left     KNEE ARTHROSCOPY Right     KNEE SURGERY      NY AMPUTATION TOE,I-P JT Bilateral 05/02/2017    Procedure: PARTIAL AMPUTATION RIGHT AND LEFT HALLUX ;  Surgeon: Iveth Juarez DPM;  [facility];  Service: Podiatry    NY AMPUTATION TOE,MT-P JT Left 07/25/2017    Procedure: 2ND TOE AMPUTATION;  Surgeon: Iveth Juarez DPM;  [facility];  Service: Podiatry    NY ANASTOMOSIS,AV,ANY SITE Left 6/1/2022    Procedure: CREATION FISTULA ARTERIOVENOUS (AV) RADIOCEPHALIC;  Surgeon: Petra Schultz MD;  [facility];  Service: Vascular    NY INSJ TUNNELED CTR VAD W/SUBQ PORT AGE 5 YR/> N/A 01/11/2022    Procedure: INSERTION VENOUS PORT ( PORT-A-CATH) IR;  Surgeon: Lyla Younger DO;  [facility];  Service: Interventional Radiology    RESECTION LOW ANTERIOR LAPAROSCOPIC N/A 10/21/2021    Procedure: RESECTION LOW ANTERIOR LAPAROSCOPIC;  Surgeon: Lev Moser MD;  [facility];  Service: Colorectal    SHOULDER ARTHROSCOPY Left     with screws,RTC    SHOULDER SURGERY Left     rotator cuff    TOE AMPUTATION Left 01/08/2020    Procedure: HALLUX AMPUTATION;  Surgeon: Bhumika Taveras DPM;  [facility];  Service: Podiatry    UPPER GASTROINTESTINAL ENDOSCOPY  03/2020    Intestinal metaplasia prepyloric    VASECTOMY      WOUND DEBRIDEMENT Left 7/16/2022    Procedure: DEBRIDEMENT WOUND [facility] OUT), possible 2nd toe amputation;  Surgeon: Deidra Slaughter DPM;  [facility];  Service: Podiatry      Family History   Problem Relation Age of Onset    Heart disease Father         passed away    Hypertension Father     Pulmonary embolism Father     Hypertension Mother         assed away     Social History     Tobacco Use    Smoking status: Former Smoker     Packs/day: 0 50     Years: 20 00     Pack years: 10 00 Types: Cigarettes     Start date: 1     Quit date:      Years since quittin 6    Smokeless tobacco: Never Used    Tobacco comment: quit 10 years ago   Substance Use Topics    Alcohol use: Never     Allergies   Allergen Reactions    Invokana [Canagliflozin] Other (See Comments)     Caused circulation problems    Lamisil [Terbinafine] Blisters     Wife states " His skin peeled from head to toe"    Other Swelling     Pomegranate - facial swelling, no swelling of tongue, esophagus  Adhesive tape   Latex Rash         Current Outpatient Medications:     allopurinol (ZYLOPRIM) 300 mg tablet, Take 1 tablet (300 mg total) by mouth every morning, Disp: 90 tablet, Rfl: 0    apixaban (ELIQUIS) 5 mg, Take 1 tablet (5 mg total) by mouth every 12 (twelve) hours (Patient taking differently: Take by mouth every 12 (twelve) hours), Disp: 180 tablet, Rfl: 3    b complex-vitamin C-folic acid (NEPHROCAPS) 1 mg capsule, Take 1 capsule by mouth in the morning , Disp: 90 capsule, Rfl: 3    Dupilumab (Dupixent) 300 MG/2ML SOPN, Inject 300 mg under the skin Once every 2 weeks, Disp: , Rfl:     famotidine (PEPCID) 40 MG tablet, TAKE 1 TABLET BY MOUTH EVERY DAY, Disp: 90 tablet, Rfl: 3    Insulin Degludec-Liraglutide (Xultophy) 100 units-3 6 mg/mL injection pen, Inject 19 units daily  , Disp: 18 mL, Rfl: 1    Insulin Pen Needle (BD Pen Needle Zenaida U/F) 32G X 4 MM MISC, Use 1 daily, Disp: 100 each, Rfl: 2    metoprolol tartrate (LOPRESSOR) 50 mg tablet, Take 1 tablet (50 mg total) by mouth every 12 (twelve) hours, Disp: 180 tablet, Rfl: 3    midodrine (PROAMATINE) 5 mg tablet, Take 1 tablet (5 mg total) by mouth 3 (three) times a week Take before dialysis, Disp: 30 tablet, Rfl: 2    omega-3-acid ethyl esters (LOVAZA) 1 g capsule, Take 2 capsules (2 g total) by mouth 2 (two) times a day Mail print prescription to pt, Disp: 360 capsule, Rfl: 3    OneTouch Ultra test strip, USE TO TEST BLOOD SUGAR 3 TIMES A DAY, Disp: 100 each, Rfl: 3    sevelamer carbonate (RENVELA) 800 mg tablet, Take 1 tablet (800 mg total) by mouth in the morning and 1 tablet (800 mg total) at noon and 1 tablet (800 mg total) in the evening  Take with meals  , Disp: 270 tablet, Rfl: 3    simvastatin (ZOCOR) 20 mg tablet, Take 1 tablet (20 mg total) by mouth daily at bedtime, Disp: 90 tablet, Rfl: 3    tamsulosin (FLOMAX) 0 4 mg, TAKE 1 CAPSULE BY MOUTH EVERY DAY WITH DINNER, Disp: 90 capsule, Rfl: 3    torsemide (DEMADEX) 20 mg tablet, Take 4 tablets (80 mg total) by mouth 4 (four) times a week Take on non-dialysis days only, Disp: 90 tablet, Rfl: 3    Review of Systems   Constitutional: Positive for fatigue  Negative for activity change, appetite change and unexpected weight change  HENT: Negative for dental problem, sore throat, trouble swallowing and voice change  Eyes: Negative for visual disturbance  Respiratory: Negative for cough, chest tightness and shortness of breath  Cardiovascular: Negative for chest pain, palpitations and leg swelling  Gastrointestinal: Negative for constipation, diarrhea, nausea and vomiting  Endocrine: Negative for polydipsia, polyphagia and polyuria  Genitourinary: Negative for frequency  Musculoskeletal: Negative for arthralgias, back pain, gait problem and myalgias  Skin: Negative for wound  Allergic/Immunologic: Positive for environmental allergies  Negative for food allergies  Neurological: Positive for numbness  Negative for dizziness, weakness, light-headedness and headaches  Hematological: Does not bruise/bleed easily  Psychiatric/Behavioral: Negative for decreased concentration, dysphoric mood and sleep disturbance  The patient is not nervous/anxious  Physical Exam:  Body mass index is 28 55 kg/m²    /80   Pulse 88   Ht 5' 10" (1 778 m)   Wt 90 3 kg (199 lb)   SpO2 94%   BMI 28 55 kg/m²    Wt Readings from Last 3 Encounters:   08/25/22 90 3 kg (199 lb)   08/18/22 88 5 kg (195 lb)   08/18/22 88 5 kg (195 lb)       Physical Exam  Vitals reviewed  Constitutional:       General: He is not in acute distress  Appearance: He is well-developed  He is not ill-appearing  HENT:      Head: Normocephalic and atraumatic  Eyes:      Pupils: Pupils are equal, round, and reactive to light  Neck:      Thyroid: No thyromegaly  Cardiovascular:      Rate and Rhythm: Normal rate and regular rhythm  Pulses: Normal pulses  Heart sounds: Normal heart sounds  Pulmonary:      Effort: Pulmonary effort is normal       Breath sounds: Normal breath sounds  Abdominal:      General: Bowel sounds are normal  There is no distension  Palpations: Abdomen is soft  Tenderness: There is no abdominal tenderness  Musculoskeletal:      Cervical back: Normal range of motion and neck supple  Right lower leg: No edema  Left lower leg: No edema  Lymphadenopathy:      Cervical: No cervical adenopathy  Skin:     General: Skin is warm and dry  Neurological:      Mental Status: He is alert and oriented to person, place, and time        Gait: Gait normal    Psychiatric:         Mood and Affect: Mood normal          Behavior: Behavior normal            Labs:   Lab Results   Component Value Date    HGBA1C 5 5 08/23/2022    HGBA1C 6 0 (H) 02/18/2022    HGBA1C 5 7 (H) 12/14/2021     Lab Results   Component Value Date    CREATININE 3 83 (H) 08/23/2022    CREATININE 2 69 (H) 07/17/2022    CREATININE 2 84 (H) 07/16/2022    BUN 44 (H) 08/23/2022     (L) 04/10/2017    K 4 7 08/23/2022     08/23/2022    CO2 28 08/23/2022     eGFR   Date Value Ref Range Status   08/23/2022 15 ml/min/1 73sq m Final     Lab Results   Component Value Date    CHOL 161 12/12/2015    HDL 32 (L) 07/31/2021    TRIG 122 07/31/2021     Lab Results   Component Value Date    ALT 11 (L) 08/23/2022    AST 24 08/23/2022    ALKPHOS 111 08/23/2022    BILITOT 0 7 04/10/2017     Lab Results   Component Value Date    GQY4CWGBLZNV 1 920 04/15/2022    NHB3WKVZZDRG 0 504 01/19/2020    BZI4POHJWWTB 3 430 09/19/2019     Lab Results   Component Value Date    FREET4 0 88 09/19/2019       Impression & Plan:    Problem List Items Addressed This Visit        Endocrine    Type 2 diabetes mellitus with chronic kidney disease, with long-term current use of insulin ([address] ) - Primary     Patient is very well controlled without episodes of hypoglycemia  Continue current regimen  Reminded him that should he have any symptoms of hypo or hyperglycemia, he is to spot check his blood sugar  Continue to test daily  Patient knows to notify me with persistent hyperglycemia episodes of hypoglycemia  Continue to follow a healthy diet  Continue with activity as tolerated  Follow-up in 6 months  Lab Results   Component Value Date    HGBA1C 5 5 08/23/2022            Relevant Medications    Insulin Degludec-Liraglutide (Xultophy) 100 units-3 6 mg/mL injection pen    Other Relevant Orders    Comprehensive metabolic panel    Hemoglobin A1C    Lipid Panel with Direct LDL reflex       Respiratory    NICOLASA (obstructive sleep apnea)     Continue CPAP use  Cardiovascular and Mediastinum    Hypertension     /80  Continue current regimen  Other    Hyperlipidemia     Check fasting lipid panel prior to next appointment  Continue statin  Orders Placed This Encounter   Procedures    Comprehensive metabolic panel     This is a patient instruction: Patient fasting for 8 hours or longer recommended  Standing Status:   Future     Standing Expiration Date:   8/25/2023    Hemoglobin A1C     Standing Status:   Future     Standing Expiration Date:   8/25/2023    Lipid Panel with Direct LDL reflex     This is a patient instruction: This test requires patient fasting for 10-12 hours or longer  Drinking of black coffee or black tea is acceptable       Standing Status:   Future     Standing Expiration Date: 8/25/2023       There are no Patient Instructions on file for this visit  Discussed with the patient and all questioned fully answered  He will call me if any problems arise  Follow-up appointment in 6 months       Counseled patient on diagnostic results, prognosis, risk and benefit of treatment options, instruction for management, importance of treatment compliance, Risk  factor reduction and impressions    PAT Barraza

## 2022-08-24 NOTE — TELEPHONE ENCOUNTER
S/w pt's wife & discussed the surgery  I offered her a different date of 9/2 and she said she is going to make some phone calls and call me back  I also let her know that she needs to call the PCP and r/s the clearance appt she cancelled because he will need that  Pt verbalized understanding      *IF THE PT'S WIFE CALLS, PLEASE TRANSFER THE CALL TO ME *

## 2022-08-25 ENCOUNTER — PREP FOR PROCEDURE (OUTPATIENT)
Dept: OBGYN CLINIC | Facility: CLINIC | Age: 67
End: 2022-08-25

## 2022-08-25 ENCOUNTER — OFFICE VISIT (OUTPATIENT)
Dept: ENDOCRINOLOGY | Facility: CLINIC | Age: 67
End: 2022-08-25
Payer: MEDICARE

## 2022-08-25 VITALS
WEIGHT: 199 LBS | DIASTOLIC BLOOD PRESSURE: 80 MMHG | SYSTOLIC BLOOD PRESSURE: 140 MMHG | BODY MASS INDEX: 28.49 KG/M2 | HEIGHT: 70 IN | HEART RATE: 88 BPM | OXYGEN SATURATION: 94 %

## 2022-08-25 DIAGNOSIS — Z79.4 TYPE 2 DIABETES MELLITUS WITH STAGE 4 CHRONIC KIDNEY DISEASE, WITH LONG-TERM CURRENT USE OF INSULIN (HCC): Primary | ICD-10-CM

## 2022-08-25 DIAGNOSIS — E78.2 MIXED HYPERLIPIDEMIA: ICD-10-CM

## 2022-08-25 DIAGNOSIS — E11.22 TYPE 2 DIABETES MELLITUS WITH STAGE 4 CHRONIC KIDNEY DISEASE, WITH LONG-TERM CURRENT USE OF INSULIN (HCC): Primary | ICD-10-CM

## 2022-08-25 DIAGNOSIS — G47.33 OSA (OBSTRUCTIVE SLEEP APNEA): ICD-10-CM

## 2022-08-25 DIAGNOSIS — N18.4 TYPE 2 DIABETES MELLITUS WITH STAGE 4 CHRONIC KIDNEY DISEASE, WITH LONG-TERM CURRENT USE OF INSULIN (HCC): Primary | ICD-10-CM

## 2022-08-25 DIAGNOSIS — I10 PRIMARY HYPERTENSION: ICD-10-CM

## 2022-08-25 PROCEDURE — 99214 OFFICE O/P EST MOD 30 MIN: CPT | Performed by: NURSE PRACTITIONER

## 2022-08-25 RX ORDER — (INSULIN DEGLUDEC AND LIRAGLUTIDE) 100; 3.6 [IU]/ML; MG/ML
INJECTION, SOLUTION SUBCUTANEOUS
Qty: 18 ML | Refills: 1 | Status: SHIPPED | OUTPATIENT
Start: 2022-08-25

## 2022-08-25 NOTE — ASSESSMENT & PLAN NOTE
Patient is very well controlled without episodes of hypoglycemia  Continue current regimen  Reminded him that should he have any symptoms of hypo or hyperglycemia, he is to spot check his blood sugar  Continue to test daily  Patient knows to notify me with persistent hyperglycemia episodes of hypoglycemia  Continue to follow a healthy diet  Continue with activity as tolerated  Follow-up in 6 months    Lab Results   Component Value Date    HGBA1C 5 5 08/23/2022

## 2022-08-25 NOTE — TELEPHONE ENCOUNTER
S/w pt's wife and scheduled his surgery for 9/2  Went over pre surgical instructions as well  She said she wanted to wait to make sure the 2nd was a go before she made the clearance appt, so she will call to do that now  I also advised her that Dr Alicia Gonzalez, pt's cardiologist, got back to me with the 2-3 day hold  I advised her that the pt will hold his Eliquis on 8/30, 8/31, and 9/1  She verbalized understanding  Pt is not on any other blood thinners or Aspirin  I let her know she will receive a call the day before to let them know the time of the surgery  Pt is scheduled for a f/u on 9/6 @ [facility] with Dr Baptiste and asked to just keep that as the postop, I told her that was okay  Should she have any other questions, I let her know she can call me or send me a message through [address]  Pt's wife verbalized understanding

## 2022-08-26 ENCOUNTER — TELEPHONE (OUTPATIENT)
Dept: RADIOLOGY | Facility: HOSPITAL | Age: 67
End: 2022-08-26

## 2022-08-26 DIAGNOSIS — E87.20 METABOLIC ACIDOSIS: Primary | ICD-10-CM

## 2022-08-26 RX ORDER — SODIUM BICARBONATE 650 MG/1
650 TABLET ORAL 2 TIMES DAILY
Qty: 180 TABLET | Refills: 3 | Status: SHIPPED | OUTPATIENT
Start: 2022-08-26

## 2022-08-30 ENCOUNTER — CONSULT (OUTPATIENT)
Dept: FAMILY MEDICINE CLINIC | Facility: CLINIC | Age: 67
End: 2022-08-30
Payer: MEDICARE

## 2022-08-30 VITALS
HEIGHT: 70 IN | TEMPERATURE: 96.8 F | OXYGEN SATURATION: 94 % | SYSTOLIC BLOOD PRESSURE: 130 MMHG | BODY MASS INDEX: 28.35 KG/M2 | WEIGHT: 198 LBS | RESPIRATION RATE: 16 BRPM | DIASTOLIC BLOOD PRESSURE: 74 MMHG | HEART RATE: 84 BPM

## 2022-08-30 DIAGNOSIS — I50.32 CHRONIC DIASTOLIC (CONGESTIVE) HEART FAILURE (HCC): ICD-10-CM

## 2022-08-30 DIAGNOSIS — I10 PRIMARY HYPERTENSION: ICD-10-CM

## 2022-08-30 DIAGNOSIS — Z01.818 PREOPERATIVE CLEARANCE: Primary | ICD-10-CM

## 2022-08-30 DIAGNOSIS — E11.22 TYPE 2 DIABETES MELLITUS WITH STAGE 4 CHRONIC KIDNEY DISEASE, WITH LONG-TERM CURRENT USE OF INSULIN (HCC): ICD-10-CM

## 2022-08-30 DIAGNOSIS — N18.4 TYPE 2 DIABETES MELLITUS WITH STAGE 4 CHRONIC KIDNEY DISEASE, WITH LONG-TERM CURRENT USE OF INSULIN (HCC): ICD-10-CM

## 2022-08-30 DIAGNOSIS — I34.2 NONRHEUMATIC MITRAL VALVE STENOSIS: ICD-10-CM

## 2022-08-30 DIAGNOSIS — E11.40 TYPE 2 DIABETES MELLITUS WITH DIABETIC NEUROPATHY, WITHOUT LONG-TERM CURRENT USE OF INSULIN (HCC): ICD-10-CM

## 2022-08-30 DIAGNOSIS — E11.621 CHRONIC ULCER OF LEFT FOOT DUE TO DIABETES MELLITUS (HCC): ICD-10-CM

## 2022-08-30 DIAGNOSIS — Z79.4 TYPE 2 DIABETES MELLITUS WITH STAGE 4 CHRONIC KIDNEY DISEASE, WITH LONG-TERM CURRENT USE OF INSULIN (HCC): ICD-10-CM

## 2022-08-30 DIAGNOSIS — I73.9 PERIPHERAL ARTERIAL DISEASE (HCC): ICD-10-CM

## 2022-08-30 DIAGNOSIS — Z99.2 END STAGE RENAL DISEASE ON DIALYSIS (HCC): ICD-10-CM

## 2022-08-30 DIAGNOSIS — I35.0 AORTIC VALVE STENOSIS, NONRHEUMATIC: ICD-10-CM

## 2022-08-30 DIAGNOSIS — J44.9 CHRONIC OBSTRUCTIVE PULMONARY DISEASE, UNSPECIFIED COPD TYPE (HCC): ICD-10-CM

## 2022-08-30 DIAGNOSIS — L97.529 CHRONIC ULCER OF LEFT FOOT DUE TO DIABETES MELLITUS (HCC): ICD-10-CM

## 2022-08-30 DIAGNOSIS — N18.6 END STAGE RENAL DISEASE ON DIALYSIS (HCC): ICD-10-CM

## 2022-08-30 DIAGNOSIS — M86.172 ACUTE OSTEOMYELITIS OF METATARSAL BONE OF LEFT FOOT (HCC): ICD-10-CM

## 2022-08-30 PROBLEM — Z89.439: Status: ACTIVE | Noted: 2017-06-15

## 2022-08-30 PROCEDURE — 99214 OFFICE O/P EST MOD 30 MIN: CPT | Performed by: FAMILY MEDICINE

## 2022-08-30 NOTE — PROGRESS NOTES
Assessment/Plan:  Patient is medically cleared for proposed emergent left foot surgery  Patient is at increased cardiovascular risk secondary to multiple medical conditions  Recommend close follow-up with Cardiology, Nephrology, Endocrinology and Infectious Disease  Patient to continue present treatment  Diagnoses and all orders for this visit:    Preoperative clearance    Acute osteomyelitis of metatarsal bone of left foot (HCC)    End stage renal disease on dialysis ([facility] )    Chronic ulcer of left foot due to diabetes mellitus ([facility] )    Type 2 diabetes mellitus with stage 4 chronic kidney disease, with long-term current use of insulin ([place] [address] )    Type 2 diabetes mellitus with diabetic neuropathy, without long-term current use of insulin ([facility])    Chronic obstructive pulmonary disease, unspecified COPD type ([facility] )    Chronic diastolic (congestive) heart failure ([facility] )    Aortic valve stenosis, nonrheumatic    Nonrheumatic mitral valve stenosis    Peripheral arterial disease ([facility] )    Primary hypertension          Subjective:      Patient ID: Shane Welch is a 69 y o  male  Patient is here for preoperative medical clearance for emergent left foot surgery scheduled on 09/02/[facility] at [facility] with Dr Kendrick Lombardo, podiatrist   Patient scheduled for left 2nd ray resection of the metatarsal secondary to osteomyelitis seen on NM scan  Patient attends hemodialysis 3 times a week and recent labs on 08/23/2022 were reviewed  Patient follows with Nephrology, Endocrinology, Infectious Disease and Cardiology regularly and they are all aware of upcoming surgery  Patient has been feeling fairly well overall    He denies any problems with anesthesia or bleeding problems in the past       The following portions of the patient's history were reviewed and updated as appropriate: allergies, current medications, past family history, past medical history, past social history, past surgical history and problem list     Review of Systems   Constitutional: Positive for fatigue  Negative for activity change, appetite change, chills, diaphoresis, fever and unexpected weight change  HENT: Negative  Eyes: Negative  Respiratory: Positive for shortness of breath  Negative for cough, chest tightness and wheezing  Cardiovascular: Positive for leg swelling  Negative for chest pain and palpitations  Gastrointestinal: Positive for constipation  Negative for abdominal pain, blood in stool, diarrhea, nausea and vomiting  Endocrine: Negative for cold intolerance and heat intolerance  Genitourinary: Positive for frequency  Negative for difficulty urinating, dysuria, hematuria and urgency  Musculoskeletal: Negative for arthralgias, back pain, gait problem, joint swelling, myalgias, neck pain and neck stiffness  Skin: Negative  Neurological: Negative for dizziness, syncope, weakness, light-headedness and headaches  Hematological: Negative for adenopathy  Does not bruise/bleed easily  Psychiatric/Behavioral: Negative for decreased concentration, dysphoric mood and sleep disturbance  The patient is not nervous/anxious  Objective:      /74   Pulse 84   Temp (!) 96 8 °F (36 °C) (Skin)   Resp 16   Ht 5' 10" (1 778 m)   Wt 89 8 kg (198 lb)   SpO2 94%   BMI 28 41 kg/m²          Physical Exam  Constitutional:       General: He is not in acute distress  Appearance: Normal appearance  He is not ill-appearing, toxic-appearing or diaphoretic  HENT:      Head: Normocephalic  Mouth/Throat:      Mouth: Mucous membranes are moist    Eyes:      General: No scleral icterus  Conjunctiva/sclera: Conjunctivae normal    Neck:      Vascular: No carotid bruit  Cardiovascular:      Rate and Rhythm: Normal rate and regular rhythm  Heart sounds: Murmur heard  Pulmonary:      Effort: Pulmonary effort is normal  No respiratory distress  Breath sounds: Normal breath sounds     Abdominal:      Palpations: Abdomen is soft  Tenderness: There is no abdominal tenderness  Musculoskeletal:      Cervical back: Neck supple  Right lower leg: No edema  Left lower leg: No edema  Lymphadenopathy:      Cervical: No cervical adenopathy  Skin:     General: Skin is warm and dry  Neurological:      General: No focal deficit present  Mental Status: He is alert and oriented to person, place, and time  Psychiatric:         Mood and Affect: Mood normal          Behavior: Behavior normal          Thought Content:  Thought content normal          Judgment: Judgment normal

## 2022-08-31 ENCOUNTER — TELEPHONE (OUTPATIENT)
Dept: INFECTIOUS DISEASES | Facility: CLINIC | Age: 67
End: 2022-08-31

## 2022-08-31 NOTE — TELEPHONE ENCOUNTER
Called spoke with pt wife about his appt tomorrow  Dr Daily West feels that it would be better to wait a week after his surgery on 9/2   Pt wife agreed and said at the moment she is at the store but if we can cancel the appt for tomorrow and then she will cb to r/s

## 2022-09-01 ENCOUNTER — HOSPITAL ENCOUNTER (EMERGENCY)
Facility: HOSPITAL | Age: 67
End: 2022-09-02
Attending: EMERGENCY MEDICINE
Payer: MEDICARE

## 2022-09-01 VITALS
TEMPERATURE: 96.1 F | OXYGEN SATURATION: 58 % | OXYGEN SATURATION: 58 % | RESPIRATION RATE: 16 BRPM | TEMPERATURE: 96.1 F | SYSTOLIC BLOOD PRESSURE: 109 MMHG | HEART RATE: 26 BPM | RESPIRATION RATE: 16 BRPM | DIASTOLIC BLOOD PRESSURE: 43 MMHG | DIASTOLIC BLOOD PRESSURE: 43 MMHG | HEART RATE: 26 BPM | SYSTOLIC BLOOD PRESSURE: 109 MMHG

## 2022-09-01 DIAGNOSIS — I46.9 CARDIAC ARREST (HCC): Primary | ICD-10-CM

## 2022-09-01 PROCEDURE — 99285 EMERGENCY DEPT VISIT HI MDM: CPT | Performed by: EMERGENCY MEDICINE

## 2022-09-01 PROCEDURE — 92950 HEART/LUNG RESUSCITATION CPR: CPT

## 2022-09-01 PROCEDURE — 99285 EMERGENCY DEPT VISIT HI MDM: CPT

## 2022-09-01 PROCEDURE — 96375 TX/PRO/DX INJ NEW DRUG ADDON: CPT

## 2022-09-01 PROCEDURE — 92950 HEART/LUNG RESUSCITATION CPR: CPT | Performed by: EMERGENCY MEDICINE

## 2022-09-01 PROCEDURE — 33016 PERICARDIOCENTESIS W/IMAGING: CPT | Performed by: EMERGENCY MEDICINE

## 2022-09-01 PROCEDURE — 36569 INSJ PICC 5 YR+ W/O IMAGING: CPT | Performed by: EMERGENCY MEDICINE

## 2022-09-01 PROCEDURE — 96374 THER/PROPH/DIAG INJ IV PUSH: CPT

## 2022-09-01 RX ORDER — CALCIUM CHLORIDE 100 MG/ML
2 INJECTION INTRAVENOUS ONCE
Status: COMPLETED | OUTPATIENT
Start: 2022-09-01 | End: 2022-09-01

## 2022-09-01 RX ORDER — AMIODARONE HYDROCHLORIDE 50 MG/ML
3 INJECTION, SOLUTION INTRAVENOUS ONCE
Status: COMPLETED | OUTPATIENT
Start: 2022-09-01 | End: 2022-09-01

## 2022-09-01 RX ORDER — DEXTROSE 50 % IN WATER 50 %
SYRINGE (ML) INTRAVENOUS CODE/TRAUMA/SEDATION MEDICATION
Status: COMPLETED | OUTPATIENT
Start: 2022-09-01 | End: 2022-09-01

## 2022-09-01 RX ORDER — EPINEPHRINE 0.1 MG/ML
SYRINGE (ML) INJECTION CODE/TRAUMA/SEDATION MEDICATION
Status: COMPLETED | OUTPATIENT
Start: 2022-09-01 | End: 2022-09-01

## 2022-09-01 RX ORDER — EPINEPHRINE 0.1 MG/ML
7 SYRINGE (ML) INJECTION ONCE
Status: COMPLETED | OUTPATIENT
Start: 2022-09-01 | End: 2022-09-01

## 2022-09-01 RX ADMIN — EPINEPHRINE 1 MG: 0.1 INJECTION, SOLUTION ENDOTRACHEAL; INTRACARDIAC; INTRAVENOUS at 14:02

## 2022-09-01 RX ADMIN — DEXTROSE MONOHYDRATE 25 G: 500 INJECTION PARENTERAL at 14:10

## 2022-09-01 RX ADMIN — INSULIN HUMAN 10 UNITS: 100 INJECTION, SOLUTION PARENTERAL at 14:12

## 2022-09-01 RX ADMIN — DEXTROSE MONOHYDRATE 25 G: 500 INJECTION PARENTERAL at 14:02

## 2022-09-01 RX ADMIN — INSULIN HUMAN 10 UNITS: 100 INJECTION, SOLUTION PARENTERAL at 14:02

## 2022-09-01 NOTE — ED PROCEDURE NOTE
Procedure  Central Line    Date/Time: 9/1/2022 2:27 PM  Performed by: Tim Marques MD  Authorized by: Tim Marques MD     Patient location:  ED  Other Assisting Provider: Yes (comment) (Dr Prema Webster and Dr Carmelo Gil)    Consent:     Consent obtained:  Emergent situation  Pre-procedure details:     Hand hygiene: Hand hygiene performed prior to insertion      Skin preparation:  ChloraPrep  Indications:     Central line indications: medications requiring central line and no peripheral vascular access    Procedure details:     Location:  Right femoral    Vessel type: vein      Laterality:  Right    Approach: percutaneous technique used      Catheter type:  Triple lumen 16cm    Catheter size:  7 Fr    Landmarks identified: yes      Ultrasound guidance: no      Manometry confirmation: no      Number of attempts:  3    Successful placement: yes    Comments:      Code called as procedure was finishing, line was not sutured or dressed                        Tim Marques MD  09/01/22 [id number]

## 2022-09-01 NOTE — ED ATTENDING ATTESTATION
2022  Jaylin Reina DO, saw and evaluated the patient  I have discussed the patient with the resident/non-physician practitioner and agree with the resident's/non-physician practitioner's findings, Plan of Care, and MDM as documented in the resident's/non-physician practitioner's note, except where noted  All available labs and Radiology studies were reviewed  I was present for key portions of any procedure(s) performed by the resident/non-physician practitioner and I was immediately available to provide assistance  At this point I agree with the current assessment done in the Emergency Department  I have conducted an independent evaluation of this patient a history and physical is as follows:      70 yo male presents for evaluation after witnessed cardiac arrest while getting out of his car  Unclear duration between collapse and initiation of CPR  Police arrived, 1 AED shock recommended and given  EMS arrived, pt in a wide complex PEA  Was given total of 7mg epi, 60meq bicarb, calcium, chest compressions  Intubated PTA by EMS  Total CPR time until arrival in our ED about 60 minutes  Arrives with continued chest compressions, BVM ventilations with 100% FiO2  Pulse check on arrival, asystole  Continued attempted resuscitation  Pericardiocentesis performed without return of pericardial fluid  L stellate ganglion block was placed given hx of persistent wide complex tachycardia PTA  B femoral vein access attempted but unsuccessful  POCUS echo shows cardiac standstill  Ultimately, after prolonged down time and continued lack of spontaneous cardiac activity, resuscitation efforts ceased  Pt declared  at 1418h  Final rhythm asystole  No spontaneous respiratory efforts  Fixed dilated pupils  Imp: cardiac arrest,   Plan: as above, will d/w family regarding  home        ED Course         Critical Care Time  Procedures

## 2022-09-01 NOTE — ED PROVIDER NOTES
History  Chief Complaint   Patient presents with   Adwoa Fountain is a 68-year-old man presenting after witnessed cardiac arrest while getting out of his car in a parking lot  Police initially ride to the scene and delivered 1 shock via an 8 ED  EMS was then called and arrived, found the patient to be in wide complex PEA  EMS crew gave a total of 7 mg of epinephrine, 60 mEq of bicarb, several doses of calcium, chest compressions  He was intubated in the field by EMS  Total time of CPR out of hospital until ED arrival was approximately 60 minutes  He arrived in the emergency department with continuation of chest compressions and ventilation with bag valve mask  Upon arrival, he was given additional doses of epinephrine and bicarb  10 units of insulin and 1 amp of dextrose were given for presumed hyperkalemia given the patient is on dialysis  Pulse check was performed upon arrival and found to be in asystole  Resuscitation was continued  Pericardiocentesis was performed without return of her current fluid  Left still leaking wound VAC was placed with lidocaine, however was unsuccessful  Femoral vein access was attempted but was unsuccessful  After continued resuscitation, another pulse check was performed, patient was found to be in asystole  Bedside cardiac ultrasound showed no cardiac wall motion  Resuscitation efforts were terminated  Patient was declared  at 2:18 p m         The patient has  at 2:18PM on 22  No spontaneous movements were present  There was no response to verbal or tactile stimuli  No pulses present in carotid artery bilaterally  No breath sounds were appreciated over bilateral lung fields  No heart sounds were auscultated across the entire precordium  Patient was pronounced dead at 2:18PM on 22  Family was notified in ED in person, discussed death with wife in family waiting room    Cause of death: suspect cardiac arrest, possibly October 29, 2018    Cornel J. Jeansonne, MD  [address] [facility] [address]     [facility] [place] [facility] Pediatric Plastic Surgery  [address] [place] [address] , [address]  [place] [address]  Phone: [phone]  Fax: [phone]   Patient: Donnell Tyler   MR Number: [id number]   YOB: 2020   Date of Visit: 2018     Dear Dr. Jeansonne:    Thank you for referring Donnell Tyler to me for evaluation. He is a 1 week old baby boy who I saw in the company of his parents in my [place] office last Wednesday for the absence of a uvulae. He does have jaundice and I asked the patient's mother to contact your office for evaluation.     On exam, there is no uvulae present. The palate is dynamic and elevates and contracts quite well. He is able to feed without any feeding adjuncts.     We will monitor him as he grows. Reasons for intervention on a palatal abnormality is for speech issues and feeding issues. So far, he does not have any feeding issues and we will watch his speech development at age-appropriate intervals. I've asked the family to return to see me when he is 9 months old. If you have any questions pertaining to his care, please contact me.    Sincerely,      Sav Bennett MD, FACS, FAAP  Craniofacial and Pediatric Plastic Surgery  [facility]  [phone]  [email or web address]     15 minutes of face to face time, of which greater than fifty percent of the total visit was  counseling/coordinating care   due to hyperkalemia            None       No past medical history on file  No past surgical history on file  No family history on file  I have reviewed and agree with the history as documented  No existing history information found  No existing history information found  Review of Systems   Unable to perform ROS: Acuity of condition       Physical Exam  ED Triage Vitals   Temperature Pulse Respirations Blood Pressure SpO2   09/01/22 1407 09/01/22 1407 09/01/22 1407 09/01/22 1407 09/01/22 1407   (!) 96 1 °F (35 6 °C) (!) 0 16 (!) 109/43 (!) 80 %      Temp src Heart Rate Source Patient Position - Orthostatic VS BP Location FiO2 (%)   -- 09/01/22 1412 -- -- --    Monitor         Pain Score       --                    Orthostatic Vital Signs  Vitals:    09/01/22 1407 09/01/22 1412   BP: (!) 109/43    Pulse: (!) 0 (!) 26       Physical Exam  Vitals and nursing note reviewed  Constitutional:       General: He is in acute distress  Appearance: He is ill-appearing  Comments: Unresponsive, CPR in progress  HENT:      Head: Normocephalic and atraumatic  Right Ear: External ear normal       Left Ear: External ear normal       Mouth/Throat:      Comments: ETT in place  Bag valve mask ventilation progress  Eyes:      General: No scleral icterus  Comments: Pupils equal and unreactive  Cardiovascular:      Comments: Absent femoral and carotid pulses on pulse check  No heart tones on pulse check  Pulmonary:      Comments: Bag valve mask ventilation in progress  Equal Bilateral breath sounds  Abdominal:      General: There is distension  Musculoskeletal:         General: No deformity  Right lower leg: No edema  Left lower leg: No edema  Skin:     Coloration: Skin is pale  Skin is not jaundiced  Neurological:      Comments: Unresponsive           ED Medications  Medications   calcium chloride (FOR EMS ONLY) 10 % injection 2 g (0 g Does not apply Given to EMS 9/1/22 1419)   EPINEPHrine (FOR EMS ONLY) (ADRENALIN) injection 7 mg (0 mg Does not apply Given to EMS 22 1417)   amiodarone (FOR EMS ONLY) 150 mg/3 mL injection 450 mg (0 mg Does not apply Given to EMS 22 1417)   sodium bicarbonate (FOR EMS ONLY) 8 4 % injection 100 mEq (0 mEq Does not apply Given to EMS 22 1418)   EPINEPHrine (ADRENALIN) injection (1 mg Intravenous Given 22 1402)   insulin regular (HumuLIN R,NovoLIN R) injection (10 Units Intraosseous Given 22 1402)   dextrose 25 g/50 mL IV solution (25 g Intraosseous Given 22 1402)   dextrose 25 g/50 mL IV solution (25 g Intraosseous Given 22 1410)   insulin regular (HumuLIN R,NovoLIN R) injection (10 Units Intravenous Given 22 141)       Diagnostic Studies  Results Reviewed     None                 No orders to display         Procedures  General Procedure    Date/Time: 2022 4:24 PM  Performed by: Fox Vogt MD  Authorized by: Fox Vogt MD     Patient location:  ED  Assisting Provider(s): Yes (comment) (Dr Jason Mcghee)    Consent:     Consent obtained:  Emergent situation  Indications:     Indications:  Cardiac arrest  Procedure Detail:     Equipment used:  21 gauge spinal needle, syrine, US    Procedure note (site, laterality, method, findings):  Pericardiocentesis was attempted during cardiac arrest   Spinal needle was inserted both subxiphoid and anteriorly, with no return of fluid or blood  Ultrasound guidance was used  ED Course                                       MDM  Number of Diagnoses or Management Options  Cardiac arrest [facility])  Diagnosis management comments: 61-year-old man presenting cardiac arrest   See HPI for all procedures and medications given in the emergency department  Resuscitation terminated prior to any lab work returning  Patient         Disposition  Final diagnoses:   Cardiac arrest [facility])     Time reflects when diagnosis was documented in both MDM as applicable and the Disposition within this note     Time User Action Codes Description Comment    2022  3:18 PM Ana Au Add [I46 9] Cardiac arrest [facility])       ED Disposition     ED Disposition       Condition   --    Date/Time   u Sep 1, 2022  3:18 PM    Comment   --         Follow-up Information    None       Date, Time and Cause of Death    Preliminary Cause of Death: Ventricular arrhythmia       Patient's Medications    No medications on file     No discharge procedures on file  PDMP Review     None           ED Provider  Attending physically available and evaluated Espinoza Swartz I managed the patient along with the ED Attending      Electronically Signed by         Marisela Cabrera MD  22 [id number]

## 2023-02-21 NOTE — RESULT NOTES
Post-op TKA Visit      02/21/23     Allergies   Allergen Reactions    Meperidine Itching    Sulfa Antibiotics Swelling     Lips & eyes     Current Outpatient Medications on File Prior to Visit   Medication Sig Dispense Refill    aspirin EC 81 MG EC tablet Take 1 tablet by mouth in the morning and 1 tablet in the evening. 70 tablet 0    promethazine (PHENERGAN) 25 MG tablet Take 1 tablet by mouth every 8 hours as needed for Nausea 30 tablet 1    methocarbamol (ROBAXIN-750) 750 MG tablet Take 1 tablet by mouth 3 times daily as needed (muscle spasm or cramps) 40 tablet 1    meloxicam (MOBIC) 7.5 MG tablet Take 1-2 tablets by mouth daily as needed for Pain 30 tablet 2    DULoxetine (CYMBALTA) 30 MG extended release capsule Take 30 mg by mouth daily      acetaminophen (TYLENOL) 325 MG tablet Take 1,000 mg by mouth 2 times daily as needed      calcium citrate-vitamin D (CITRICAL + D) 315-250 MG-UNIT TABS per tablet Take 1 tablet by mouth 2 times daily      vitamin D (CHOLECALCIFEROL) 25 MCG (1000 UT) TABS tablet Take 5,000 Units by mouth daily      cycloSPORINE (RESTASIS) 0.05 % ophthalmic emulsion Apply 1 drop to eye daily (Patient not taking: No sig reported)      denosumab (PROLIA) 60 MG/ML SOSY SC injection Inject 60 mg into the skin      tamsulosin (FLOMAX) 0.4 MG capsule Take 0.4 mg by mouth daily (Patient not taking: No sig reported)      [DISCONTINUED] ascorbic acid (VITAMIN C) 500 MG tablet Take by mouth      [DISCONTINUED] diclofenac sodium (VOLTAREN) 1 % GEL Apply 4 g topically 4 times daily      [DISCONTINUED] L-Lysine 1000 MG TABS Take by mouth as needed (Patient not taking: Reported on 7/15/2022)      [DISCONTINUED] naproxen sodium (ANAPROX) 220 MG tablet Take 220 mg by mouth as needed (Patient not taking: Reported on 7/15/2022)       No current facility-administered medications on file prior to visit.         7 months Status Post right TKA     History: The patient returns today for post-op visit following Verified Results  (1) CBC/PLT/DIFF [id number] 07:39PM [facility]     Test Name Result Flag Reference   WBC COUNT 9 80 Thousand/uL  4 31-10 16   RBC COUNT 4 12 Million/uL  3 88-5 62   HEMOGLOBIN 13 1 g/dL  12 0-17 0   HEMATOCRIT 38 3 %  36 5-49 3   MCV 93 fL  82-98   MCH 31 8 pg  26 8-34 3   MCHC 34 2 g/dL  31 4-37 4   RDW 15 0 %  11 6-15 1   MPV 10 3 fL  8 9-12 7   PLATELET COUNT 322 Thousands/uL  149-390   nRBC AUTOMATED 0 /100 WBCs     This is an appended report  These results have been appended to a previously preliminary verified report  - Patient Instructions: This bloodwork is non-fasting  Please drink two glasses of water morning of bloodwork  This is an appended report  These results have been appended to a previously verified report  NEUTROPHILS - REL 54 %  43-75   LYMPHOCYTES - REL 34 %  14-44   MONOCYTES - REL 4 %  4-12   EOSINOPHILS - REL 4 %  0-6   BASOPHILS - REL 4 % H 0-1   NEUTROPHILS ABS 5 29 Thousand/uL  1 85-7 62   LYMPHOTCYTES ABS 3 33 Thousand/uL  0 60-4 47   MONOCYTES ABS 0 39 Thousand/uL  0 00-1 22   EOSINOPHILS ABS 0 39 Thousand/uL  0 00-0 40   BASOPHILS ABS 0 39 Thousand/uL H 0 00-0 10   TOTAL COUNTED      RBC MORPHOLOGY Present     Poikilocytosis Present     PLT ESTIMATE Adequate  Adequate   - Patient Instructions: This bloodwork is non-fasting  Please drink two glasses of water morning of bloodwork  (1) HEMOGLOBIN A1C [id number] 07:39PM [employer]     Test Name Result Flag Reference   HEMOGLOBIN A1C 8 2 % H 4 2-6 3   EST  AVG  GLUCOSE 189 mg/dl       (1) LIPID PANEL, FASTING [id number] 07:39PM [employer]     Test Name Result Flag Reference   CHOLESTEROL 97 mg/dL     HDL,DIRECT 32 mg/dL L 40-60   Specimen collection should occur prior to Metamizole administration due to the potential for falsely depressed results  LDL CHOLESTEROL CALCULATED 17 mg/dL  0-100   - Patient Instructions:  This is a fasting blood test  Water,black tea or black  coffee only after 9:00pm the night before test   Drink 2 glasses of water the morning of test      Number: [id number]  Triglyceride:         Normal              <150 mg/dl       Borderline High    150-199 mg/dl       High               200-499 mg/dl       Very High          >499 mg/dl  Cholesterol:         Desirable        <200 mg/dl      Borderline High  200-239 mg/dl      High             >239 mg/dl  HDL Cholesterol:        High    >59 mg/dL      Low     <41 mg/dL  LDL CALCULATED:    This screening LDL is a calculated result  It does not have the accuracy of the Direct Measured LDL in the monitoring of patients with hyperlipidemia and/or statin therapy  Direct Measure LDL (QTH949) must be ordered separately in these patients  TRIGLYCERIDES 238 mg/dL H <=150   Specimen collection should occur prior to N-Acetylcysteine or Metamizole administration due to the potential for falsely depressed results  (1) MICROALBUMIN CREATININE RATIO, RANDOM URINE 21Jul2016 07:39PM [employer]     Test Name Result Flag Reference   MICROALBUMIN/ CREAT R 687 mg/g creatinine H 0-30   MICROALBUMIN,URINE 244 0 mg/L H 0 0-20 0   CREATININE URINE 35 5 mg/dL       (1) TSH WITH FT4 REFLEX 21Jul2016 07:39PM [employer]     Test Name Result Flag Reference   TSH 1 880 uIU/mL  0 358-3 740   Number: [id number]  Patients undergoing fluorescein dye angiography may retain small amounts of fluorescein in the body for 48-72 hours post procedure  Samples containing fluorescein can produce falsely depressed TSH values  If the patient had this procedure,a specimen should be resubmitted post fluorescein clearance       (1) VITAMIN D 25-HYDROXY 21Jul2016 07:39PM [facility] Order Number: [id number]   Order Number: [id number]     Test Name Result Flag Reference   VIT D 25-HYDROX 32 8 ng/mL  30 0-100 0   This assay is a certified procedure of the CDC Vitamin D Standardization Certification Program right TKA. Their pain is improving. They are ambulating without any walking aid. They have completed physical therapy and resumed most of their normal activities. They are pleased with their results thus far. Denies any new complaints today. Physical Exam:  The patient's incision is well healed. There is no swelling and no warmth. ROM is 0 to 125 degrees. There is no M/L or A/P instability. The calf is soft and non-tender. Neurologic and vascular exams are intact. X-Rays: AP, sunrise, and Lateral x-rays of right reveals a cemented TKA, Sugey prosthesis. There is no patella arthroplasty. There is good alignment and good position of the components. X-Ray Diagnosis: Satisfactory appearance right TKA    Disposition: The patient is doing well following right TKA. They will continue physical therapy exercises and normal activity as tolerated. The patient was advised about fall precautions and dental prophylaxis. The patient will follow up as scheduled in 2-3 years or sooner if needed. (VDSCP)     Deficiency <20ng/ml   Insufficiency 20-30ng/ml   Sufficient  ng/ml     *Patients undergoing fluorescein dye angiography may retain small amounts of fluorescein in the body for 48-72 hours post procedure  Samples containing fluorescein can produce falsely elevated Vitamin D values  If the patient had this procedure, a specimen should be resubmitted post fluorescein clearance  (1) PSA (SCREEN) (Dx V76 44 Screen for Prostate Cancer) [id number] 07:39PM Mary Jonathan Order Number: [id number]   Order Number: [id number] Patient Instructions: This test is non-fasting  Please drink two glasses of water morning of bloodwork  Test Name Result Flag Reference   PROSTATE SPECIFIC ANTIGEN 0 4 ng/mL  0 0-4 0   - Patient Instructions: This test is non-fasting  Please drink two glasses of water morning of bloodwork  (1) COMPREHENSIVE METABOLIC PANEL [id number] 31:43VC Mary Jonathan Order Number: [id number] Patient Instructions: This test is non-fasting  Please drink two glasses of water morning of bloodwork  Test Name Result Flag Reference   GLUCOSE,RANDM 176 mg/dL H    If the patient is fasting, the ADA then defines impaired fasting glucose as > 100 mg/dL and diabetes as > or equal to 123 mg/dL     SODIUM 137 mmol/L  136-145   POTASSIUM 4 3 mmol/L  3 5-5 3   CHLORIDE 102 mmol/L  100-108   CARBON DIOXIDE 24 mmol/L  21-32   ANION GAP (CALC) 11 mmol/L  4-13   BLOOD UREA NITROGEN 19 mg/dL  5-25   CREATININE 0 83 mg/dL  0 60-1 30   Standardized to IDMS reference method   CALCIUM 9 4 mg/dL  8 3-10 1   BILI, TOTAL 0 44 mg/dL  0 20-1 00   ALK PHOSPHATAS 71 U/L     ALT (SGPT) 33 U/L  12-78   AST(SGOT) 15 U/L  5-45   ALBUMIN 3 6 g/dL  3 5-5 0   TOTAL PROTEIN 6 7 g/dL  6 4-8 2   eGFR Non-African American      >60 0 ml/min/1 73sq m   [facility] Disease Education Program recommendations are as follows:  GFR calculation is accurate only with a steady state creatinine  Chronic Kidney disease less than 60 ml/min/1 73 sq  meters  Kidney failure less than 15 ml/min/1 73 sq  meters  (1) COMPREHENSIVE METABOLIC PANEL [id number] 70:20NA [place] Order Number: [id number]     Test Name Result Flag Reference   GLUCOSE,RANDM 172 mg/dL H    If the patient is fasting, the ADA then defines impaired fasting glucose as > 100 mg/dL and diabetes as > or equal to 123 mg/dL  SODIUM 139 mmol/L  136-145   POTASSIUM 4 5 mmol/L  3 5-5 3   CHLORIDE 101 mmol/L  100-108   CARBON DIOXIDE 24 mmol/L  21-32   ANION GAP (CALC) 14 mmol/L H 4-13   BLOOD UREA NITROGEN 19 mg/dL  5-25   CREATININE 0 80 mg/dL  0 60-1 30   Standardized to IDMS reference method   CALCIUM 9 3 mg/dL  8 3-10 1   BILI, TOTAL 0 56 mg/dL  0 20-1 00   ALK PHOSPHATAS 67 U/L     ALT (SGPT) 34 U/L  12-78   AST(SGOT) 18 U/L  5-45   ALBUMIN 3 6 g/dL  3 5-5 0   TOTAL PROTEIN 6 6 g/dL  6 4-8 2   eGFR Non-African American      >60 0 ml/min/1 73sq m   [facility] Disease Education Program recommendations are as follows:  GFR calculation is accurate only with a steady state creatinine  Chronic Kidney disease less than 60 ml/min/1 73 sq  meters  Kidney failure less than 15 ml/min/1 73 sq  meters  Plan  DM type 2, not at goal    · (1) HEMOGLOBIN A1C ; every 3 months; Last 20Mup2895; Next 55XEE8044; Status:Active  Hyperlipidemia    · (1) LIPID PANEL, FASTING ; every 1 year; Last 30Zho4966; Next 04Shk0484; Status:Active    Discussion/Summary   Overall BS control, HgbA1C, improved at 8 2 although remains elevated and Trig remain elevated  Cont present Tx and follow low fat and low sugar/carb diet more carefully

## 2023-04-04 NOTE — NURSING NOTE
AVS reviewed with patient  No IV, no telemetry  PICC line remains for long term antibiotic therapy  Right arm  Patient aware of discontinuation of PICC to be Feb 6th  No further questions at this time  Reduced oral grading

## 2023-07-24 NOTE — OP NOTE
Chest port placement 1/11/22     History:      Colon carcinoma     Contrast: none     Procedure: The patient was identified verbally and by wristband  Timeout was performed  Informed consent was obtained  All elements of maximal sterile barrier technique were followed (cap, mask, sterile gown, sterile gloves, large sterile sheet, hand hygiene and 2% chlorhexidine for cutaneous antisepsis)  Following obtaining informed consent, the patient was prepped and draped in the usual sterile fashion  Using ultrasound guidance, access was gained to the patient's right nternal jugular vein using a micropuncture system  The micropuncture wire was removed and a  035 wire was advanced to the level of the inferior vena cava using fluoroscopic guidance  Using 1% lidocaine, the region of the right anterior chest was was anesthetized  A small incision was made using a 15 blade scalpel  The port pocket was created using blunt dissection  The catheter tubing was tunneled from the incision to the venotomy  The micropuncture dilator was exchanged for a peel-away sheath, using fluoroscopic guidance  The catheter was place through the peel-away sheath  The catheter was measured and cut to size  The catheter was attached to the port  The port was flushed with saline to ensure patency without evidence of leakage  The port was placed in the port pocket  The port was sutured in the pocket using 3-0 Vicryl  The incisions were approximated with 3-0 Vicryl and tissue adhesive  The patient tolerated the procedure well without apparent immediate complications  The patient left the IR department in unchanged condition  Dr Deanne Salgado performed and directly supervised the entire procedure  Findings:      Using ultrasound guidance, the right internal jugular vein was cannulated using Seldinger technique  The right internal jugular vein was evaluated as a potential access site   The right internal jugular vein was patent, and Pt is ready to work with cardiopulmonary rehab at this time. Pre exercise VSS. Pt ambulated 300' with stand by assist while pushing a wheelchair. Ambulated on a and SpO2 dropped to 85-86%. Pt reports no complaints with activity. Post exercise VSS. Call light given and voices no other needs at this time. RN aware of session tolerance.     07/24/23 1300   Session Information   Phase Phase I   Session Number 7   Pre-Session Evaluation   Is patient on O2? Y   Resting Vital Signs   Resting Heart Rate 61   Resting Dyspnea No   SpO2 93 %   Pulse Ox  Mode Intermittent   O2 Device Nasal cannula   O2 Flow Rate (L/min) 1 L/min   Exercise/Activity   Exercise/Activity Walking   Walking Exercise Assessment    Walking Intensity Easy   Walking Adjusted Workload wheelchair   Walking Distance (ft) 300 Feet   During Exercise   Number of Assists Used Stand-by   Gait Steady   Exercise HR 71   Exercise Dyspnea No   SpO2 (!) 86 %   Pulse Ox  Mode Continuous   O2 Device Nasal cannula   O2 Flow Rate (L/min) 2 L/min   Recovery Vital Signs   Recovery HR 67   Recovery /77   Recovery Dyspnea No   SpO2 92 %   Pulse Ox  Mode Intermittent   O2 Device Nasal cannula   O2 Flow Rate (L/min) 2 L/min        free of thrombus  Static images of the vessel was obtained  Visualization of real time needle entry into the vessel was obtained  Fluoroscopic spot image demonstrates a newly placed single lumen chest port via the right internal jugular vein with the most central aspect at the SVC/RA junction  The catheter tubing is smooth in contour  IMPRESSION:     Successful placement of a single lumen chest port via the right internal jugular vein

## 2023-08-30 NOTE — PROGRESS NOTES
Assessment/Plan:    Osteomyelitis of left foot (HCC)  Residual osteomyelitis of left 1st metatarsal stump after distal met head resection  Bone culture grew MDR E coli   Probability of cure with antibiotics alone for foot osteomyelitis is extremely low   Ideally, patient should undergo further bone resection, however, per discussion with podiatry with inpatient, there is concern for loss of stability and ambulation with further surgery  Therefore, we are attempting to treat issues residual osteomyelitis with prolonged IV antibiotic course  He has a RUE PICC intact and continues to receive IV Cefepime which is scheduled through 3/30/2020 for a 6-week treatment course  He is tolerating antibiotics without difficulty  WBC count and renal function remain stable  Multiple drug resistant organism (MDRO) culture positive  Patient's L foot bone culture collected on 2/10/2020 was positive for MDR e coli  He is currently undergoing IV Cefepime treatment through 3/30/2020  Amputation of left great toe ([facility] [address] )  Patient's initial L hallux amputation occurred on 1/8/2020  He required a second trip back to the OR on 1/12/2020 for further washout and resection of the sesamoid bone  He returned to the hospital for partial ray amputation on 2/10/2020 after experiencing wound dehiscence  He has residual osteomyelitis in the foot as documented above  There is low likelihood of clearance of the osteomyelitis with antibiotic treatment along, however further resection will likely cause instability of the foot and loss of ability to ambulate  We are therefore treating with antibiotics as above  Type 2 diabetes mellitus with diabetic neuropathy, without long-term [facility]  Patient's last hemoglobin A1c was 6 4% on 01/07/2020  Recommend tight glycemic control for overall health, especially in the setting of infection  Stage 3 chronic kidney disease [facility])  Patient following as an outpatient with Dr Apolonia Rodriguez   He is completing weekly BMP while on IV cefepime  His recent creatinine was 1 6 which was improved over previous reading  Will continue to monitor weekly BMP and will notify patient directly if renal function worsens  Subjective:      Patient ID: Yana Yepez is a 75 y o  male  HPI   Mr Margo Herrera presents to the [facility] Infectious Disease office with L 1st metatarsal stump osteomyelitis, MDRO e coli infection, DMII, and CKD 3  He is s/p L hallux amputation with development of surgical site break down  He had additional resection of affected bone but surgical margins later came back with residual infection  He was therefore admitted at [facility] from 2/18/2020 - 2/20/2020 for placement of a PICC and coordination of IV antibiotic treatment for residual osteomyelitis  Patient has a [address] intact  His VNA provider comes weekly for dressing changes and lab work  His wife and adult daughter are administering his IV cefepime and report no difficulties with the PICC  Patient is seeing his outpatient podiatrist 2-3 times weekly for dressing changes  Patient is keeping the foot dry in between podiatry visits and tells me his podiatrist is "cautiously optimistic" that his wound is healing  CHART REVIEW  In depth review of patient's medical history, surgical history, family history, social history, and allergies was performed  Review of Systems   Constitutional: Negative for activity change, chills, diaphoresis and fever  HENT: Negative for congestion, rhinorrhea, sinus pressure and sore throat  Respiratory: Negative for cough, chest tightness, shortness of breath and wheezing  Cardiovascular: Positive for leg swelling  Negative for chest pain and palpitations  Gastrointestinal: Negative for abdominal pain, diarrhea, nausea and vomiting  Genitourinary: Negative for dysuria  Musculoskeletal: Negative for gait problem  Neurological: Negative for dizziness and headaches           Objective:      BP 134/62   Pulse 78   Temp 97 9 °F (36 6 °C)   Ht 5' 10" (1 778 m)   Wt 89 6 kg (197 lb 9 6 oz)   BMI 28 35 kg/m²          Physical Exam   Constitutional: He is oriented to person, place, and time  He appears well-developed and well-nourished  No distress  HENT:   Head: Normocephalic  Eyes: Pupils are equal, round, and reactive to light  Conjunctivae and EOM are normal  No scleral icterus  Neck: Normal range of motion  Neck supple  No JVD present  Cardiovascular: Normal rate, regular rhythm and normal heart sounds  Exam reveals no gallop and no friction rub  No murmur heard  Pulmonary/Chest: Effort normal and breath sounds normal  No respiratory distress  He has no wheezes  Abdominal: Soft  Bowel sounds are normal  He exhibits no distension  There is no tenderness  Lymphadenopathy:     He has no cervical adenopathy  Neurological: He is alert and oriented to person, place, and time  Skin: Skin is warm and dry  No rash noted  He is not diaphoretic  Psychiatric: He has a normal mood and affect   His behavior is normal  Judgment and thought content normal  Admission

## (undated) DEVICE — STERILE ICS MINOR PACK: Brand: CARDINAL HEALTH

## (undated) DEVICE — FIRST STEP BEDSIDE KIT - STAND-UP POUCH, ENDOSCOPIC CLEANING PAD - 1 POUCH: Brand: FIRST STEP BEDSIDE KIT - STAND-UP POUCH, ENDOSCOPIC CLEANING PAD

## (undated) DEVICE — ACCESS PLATFORM FOR MINIMALLY INVASIVE SURGERY.: Brand: GELPORT® LAPAROSCOPIC  SYSTEM

## (undated) DEVICE — STRL BETHLEHEM A V FISTULA PK: Brand: CARDINAL HEALTH

## (undated) DEVICE — CURITY NON-ADHERENT STRIPS: Brand: CURITY

## (undated) DEVICE — BETHLEHEM UNIVERSAL  MIONR EXT: Brand: CARDINAL HEALTH

## (undated) DEVICE — KERLIX BANDAGE ROLL: Brand: KERLIX

## (undated) DEVICE — INTENDED FOR TISSUE SEPARATION, AND OTHER PROCEDURES THAT REQUIRE A SHARP SURGICAL BLADE TO PUNCTURE OR CUT.: Brand: BARD-PARKER SAFETY BLADES SIZE 15, STERILE

## (undated) DEVICE — PENCIL ELECTROSURG E-Z CLEAN -0035H

## (undated) DEVICE — HEMOSTAT POWDER ADSORB SURGICEL 3GM

## (undated) DEVICE — GLOVE INDICATOR PI UNDERGLOVE SZ 7 BLUE

## (undated) DEVICE — SUT PROLENE 7-0 BV175-8/BV175-8 24 IN EPM8747

## (undated) DEVICE — NEEDLE 25G X 1 1/2

## (undated) DEVICE — SUT MONOCRYL 4-0 PS-2 27 IN Y426H

## (undated) DEVICE — 10FR FRAZIER SUCTION HANDLE: Brand: CARDINAL HEALTH

## (undated) DEVICE — SUT ETHILON 3-0 FS-1 18 IN 663G

## (undated) DEVICE — GLOVE PI ULTRA TOUCH SZ 6

## (undated) DEVICE — ACE WRAP 4 IN UNSTERILE

## (undated) DEVICE — STOCKINETTE REGULAR

## (undated) DEVICE — NEEDLE 18 G X 1 1/2

## (undated) DEVICE — BLADE SAGITTAL 25.6 X 9.5MM

## (undated) DEVICE — TUBING SUCTION 5MM X 12 FT

## (undated) DEVICE — SYRINGE 10ML LL

## (undated) DEVICE — SUT SILK 3-0 30 IN A304H

## (undated) DEVICE — CONTOUR CURVED CUTTER STAPLER: Brand: CONTOUR

## (undated) DEVICE — CURITY PLAIN PACKING STRIP: Brand: CURITY

## (undated) DEVICE — VIAL DECANTER

## (undated) DEVICE — 40529 DERMAPROX PAD 11'' X 15'' X 1'': Brand: 40529 DERMAPROX PAD 11'' X 15'' X 1''

## (undated) DEVICE — SUT VICRYL 3-0 SH 27 IN J416H

## (undated) DEVICE — CUFF TOURNIQUET 18 X 4 IN QUICK CONNECT DISP 1 BLADDER

## (undated) DEVICE — 3M™ IOBAN™ 2 ANTIMICROBIAL INCISE DRAPE 6650EZ: Brand: IOBAN™ 2

## (undated) DEVICE — ANTIBACTERIAL UNDYED BRAIDED (POLYGLACTIN 910), SYNTHETIC ABSORBABLE SUTURE: Brand: COATED VICRYL

## (undated) DEVICE — ASTOUND STANDARD SURGICAL GOWN, XL: Brand: CONVERTORS

## (undated) DEVICE — 3M™ IOBAN™ 2 ANTIMICROBIAL INCISE DRAPE 6640EZ: Brand: IOBAN™ 2

## (undated) DEVICE — JP CHANNEL DRAIN 19FR, FULL FLUTES: Brand: JACKSON-PRATT

## (undated) DEVICE — SUT PROLENE 2-0 SH 36 IN 8523H

## (undated) DEVICE — UNIVERSAL  MINOR EXTREMITY PK: Brand: CARDINAL HEALTH

## (undated) DEVICE — SPY ELITE SINGLE VIAL KIT

## (undated) DEVICE — SYRINGE 5ML LL

## (undated) DEVICE — SPONGE LAP 18 X 18 IN STRL RFD

## (undated) DEVICE — SUT ETHILON 4-0 PS-2 18 IN 1667H

## (undated) DEVICE — ACE WRAP 4 IN STERILE

## (undated) DEVICE — CIRCULAR MECH XL SEAL 29MM

## (undated) DEVICE — WET SKIN PREP TRAY: Brand: MEDLINE INDUSTRIES, INC.

## (undated) DEVICE — GLOVE PI ULTRA TOUCH SZ.6.5

## (undated) DEVICE — GAUZE SPONGES,16 PLY: Brand: CURITY

## (undated) DEVICE — PETRI DISH STERILE

## (undated) DEVICE — CULTURE TUBE ANAEROBIC

## (undated) DEVICE — SUT PDS PLUS 1 CTX 36IN PDP371T

## (undated) DEVICE — MINOR PROCEDURE DRAPE: Brand: CONVERTORS

## (undated) DEVICE — OCCLUSIVE GAUZE STRIP,3% BISMUTH TRIBROMOPHENATE IN PETROLATUM BLEND: Brand: XEROFORM

## (undated) DEVICE — 2000CC GUARDIAN II: Brand: GUARDIAN

## (undated) DEVICE — INTENDED FOR TISSUE SEPARATION, AND OTHER PROCEDURES THAT REQUIRE A SHARP SURGICAL BLADE TO PUNCTURE OR CUT.: Brand: BARD-PARKER ® CARBON RIB-BACK BLADES

## (undated) DEVICE — CHLORAPREP HI-LITE 26ML ORANGE

## (undated) DEVICE — CULTURE TUBE AEROBIC

## (undated) DEVICE — COBAN 4 IN STERILE

## (undated) DEVICE — GLOVE SRG BIOGEL 6.5

## (undated) DEVICE — SYRINGE 30ML LL

## (undated) DEVICE — COVER PROBE INTRAOPERATIVE 6 X 96 IN

## (undated) DEVICE — SUT VICRYL PLUS 0 UR-6 27IN VCP603H

## (undated) DEVICE — SUT MONOCRYL PLUS 4-0 PS-2 18 IN MCP496G

## (undated) DEVICE — VISUALIZATION SYSTEM: Brand: CLEARIFY

## (undated) DEVICE — ADHESIVE SKIN HIGH VISCOSITY EXOFIN 1ML

## (undated) DEVICE — ENSEAL X1 TISSUE SEALER, CURVED JAW, 37 CM SHAFT LENGTH: Brand: ENSEAL

## (undated) DEVICE — STRETCH BANDAGE: Brand: CURITY

## (undated) DEVICE — POOLE SUCTION HANDLE: Brand: CARDINAL HEALTH

## (undated) DEVICE — PLUMEPEN PRO 10FT

## (undated) DEVICE — UNDER BUTTOCKS DRAPE W/FLUID CONTROL POUCH: Brand: CONVERTORS

## (undated) DEVICE — CONTOUR CURVED CUTTER STAPLER RELOAD: Brand: CONTOUR

## (undated) DEVICE — MEDI-VAC YANK SUCT HNDL W/TPRD BULBOUS TIP: Brand: CARDINAL HEALTH

## (undated) DEVICE — Device: Brand: DEFENDO AIR/WATER/SUCTION AND BIOPSY VALVE

## (undated) DEVICE — SUT PROLENE 3-0 PS1 18 IN 8663G

## (undated) DEVICE — CAST PADDING 4 IN SYNTHETIC NON-STRL

## (undated) DEVICE — SUT MONOCRYL 3-0 SH 27 IN Y416H

## (undated) DEVICE — CURITY STRETCH BANDAGE: Brand: CURITY

## (undated) DEVICE — ABDOMINAL PAD: Brand: DERMACEA

## (undated) DEVICE — JACKSON-PRATT 100CC BULB RESERVOIR: Brand: CARDINAL HEALTH

## (undated) DEVICE — REM POLYHESIVE ADULT PATIENT RETURN ELECTRODE: Brand: VALLEYLAB

## (undated) DEVICE — POV-IOD SOLUTION 4OZ BT

## (undated) DEVICE — MAYO STAND COVER: Brand: CONVERTORS

## (undated) DEVICE — IV CATH INTROCAN 18G X 1 1/4 SAFETY

## (undated) DEVICE — ELECTRODE BLADE MOD  E-Z CLEAN 6.5IN -0014M

## (undated) DEVICE — TELFA NON-ADHERENT ABSORBENT DRESSING: Brand: TELFA

## (undated) DEVICE — CO2 AND WATER TUBING/CAP SET FOR OLYMPUS® SCOPES & UCR: Brand: ERBE

## (undated) DEVICE — SPONGE STICK WITH PVP-I: Brand: KENDALL

## (undated) DEVICE — SUT SILK 4-0 30 IN A303H

## (undated) DEVICE — SCD SEQUENTIAL COMPRESSION COMFORT SLEEVE MEDIUM KNEE LENGTH: Brand: KENDALL SCD

## (undated) DEVICE — CYSTO TUBING SINGLE IRRIGATION

## (undated) DEVICE — GLOVE PI ULTRA TOUCH SZ.7.5

## (undated) DEVICE — ULTRASOUND GEL STERILE FOIL PK

## (undated) DEVICE — GLOVE PI ULTRA TOUCH SZ.8.0

## (undated) DEVICE — TUBING SMOKE EVAC W/FILTRATION DEVICE PLUMEPORT ACTIV

## (undated) DEVICE — TRAY FOLEY 16FR URIMETER SILICONE SURESTEP

## (undated) DEVICE — SPECIMEN CONTAINER STERILE PEEL PACK

## (undated) DEVICE — PACK PBDS STERILE LAP LITHOTOMY RF

## (undated) DEVICE — SAW BLADE MICRO SAGGITAL 4.5MM X 25.5 X .024

## (undated) DEVICE — GLOVE INDICATOR PI UNDERGLOVE SZ 8.5 BLUE

## (undated) DEVICE — IRRIG ENDO FLO TUBING

## (undated) DEVICE — PROXIMATE SKIN STAPLERS (35 WIDE) CONTAINS 35 STAINLESS STEEL STAPLES (FIXED HEAD): Brand: PROXIMATE

## (undated) DEVICE — SYRINGE 3ML LL

## (undated) DEVICE — TROCAR: Brand: KII FIOS FIRST ENTRY

## (undated) DEVICE — DRAPE TOWEL: Brand: CONVERTORS

## (undated) DEVICE — 40595 XL TRENDELENBURG POSITIONING KIT: Brand: 40595 XL TRENDELENBURG POSITIONING KIT

## (undated) DEVICE — GLOVE PI ULTRA TOUCH SZ.7.0

## (undated) DEVICE — TOURNIQUET 12 IN STERILE SINGLE

## (undated) DEVICE — BLADE ACL FINE 9.5X25.5X0.4MM

## (undated) DEVICE — LIGACLIP MCA MULTIPLE CLIP APPLIERS, 20 SMALL CLIPS: Brand: LIGACLIP

## (undated) DEVICE — SPONGE 4 X 4 XRAY 16 PLY STRL LF RFD

## (undated) DEVICE — PREMIUM DRY TRAY LF: Brand: MEDLINE INDUSTRIES, INC.

## (undated) DEVICE — GLOVE SRG BIOGEL 7

## (undated) DEVICE — GLOVE SRG LF STRL BGL SKNSNS 7 PF

## (undated) DEVICE — GLOVE INDICATOR PI UNDERGLOVE SZ 6.5 BLUE

## (undated) DEVICE — BAG DECANTER

## (undated) DEVICE — THE SIMPULSE SOLO SYSTEM WITH ULTREX RETRACTABLE SPLASH SHIELD TIP: Brand: SIMPULSE SOLO

## (undated) DEVICE — PAD CAST 4 IN COTTON NON STERILE

## (undated) DEVICE — SUT SILK 2-0 30 IN A305H

## (undated) DEVICE — SUT VICRYL 0 REEL 54 IN J287G

## (undated) DEVICE — INSUFLATION TUBING INSUFLOW (LEXION)

## (undated) DEVICE — GLOVE INDICATOR PI UNDERGLOVE SZ 8 BLUE

## (undated) DEVICE — TROCAR: Brand: KII SLEEVE

## (undated) DEVICE — GOWN,SLEEVE,STERILE,W/CSR WRAP,1/P: Brand: MEDLINE